# Patient Record
Sex: MALE | Race: WHITE | NOT HISPANIC OR LATINO | Employment: FULL TIME | URBAN - METROPOLITAN AREA
[De-identification: names, ages, dates, MRNs, and addresses within clinical notes are randomized per-mention and may not be internally consistent; named-entity substitution may affect disease eponyms.]

---

## 2017-01-03 ENCOUNTER — GENERIC CONVERSION - ENCOUNTER (OUTPATIENT)
Dept: OTHER | Facility: OTHER | Age: 72
End: 2017-01-03

## 2017-01-06 ENCOUNTER — ALLSCRIPTS OFFICE VISIT (OUTPATIENT)
Dept: OTHER | Facility: OTHER | Age: 72
End: 2017-01-06

## 2017-01-09 ENCOUNTER — GENERIC CONVERSION - ENCOUNTER (OUTPATIENT)
Dept: OTHER | Facility: OTHER | Age: 72
End: 2017-01-09

## 2017-02-07 ENCOUNTER — GENERIC CONVERSION - ENCOUNTER (OUTPATIENT)
Dept: OTHER | Facility: OTHER | Age: 72
End: 2017-02-07

## 2017-02-24 ENCOUNTER — ALLSCRIPTS OFFICE VISIT (OUTPATIENT)
Dept: OTHER | Facility: OTHER | Age: 72
End: 2017-02-24

## 2017-03-31 ENCOUNTER — ALLSCRIPTS OFFICE VISIT (OUTPATIENT)
Dept: OTHER | Facility: OTHER | Age: 72
End: 2017-03-31

## 2017-04-06 ENCOUNTER — ALLSCRIPTS OFFICE VISIT (OUTPATIENT)
Dept: OTHER | Facility: OTHER | Age: 72
End: 2017-04-06

## 2017-04-14 ENCOUNTER — ALLSCRIPTS OFFICE VISIT (OUTPATIENT)
Dept: OTHER | Facility: OTHER | Age: 72
End: 2017-04-14

## 2017-06-02 ENCOUNTER — ALLSCRIPTS OFFICE VISIT (OUTPATIENT)
Dept: OTHER | Facility: OTHER | Age: 72
End: 2017-06-02

## 2017-06-19 ENCOUNTER — ALLSCRIPTS OFFICE VISIT (OUTPATIENT)
Dept: OTHER | Facility: OTHER | Age: 72
End: 2017-06-19

## 2017-06-23 ENCOUNTER — GENERIC CONVERSION - ENCOUNTER (OUTPATIENT)
Dept: OTHER | Facility: OTHER | Age: 72
End: 2017-06-23

## 2017-06-28 ENCOUNTER — GENERIC CONVERSION - ENCOUNTER (OUTPATIENT)
Dept: OTHER | Facility: OTHER | Age: 72
End: 2017-06-28

## 2017-06-30 ENCOUNTER — GENERIC CONVERSION - ENCOUNTER (OUTPATIENT)
Dept: OTHER | Facility: OTHER | Age: 72
End: 2017-06-30

## 2017-07-11 ENCOUNTER — GENERIC CONVERSION - ENCOUNTER (OUTPATIENT)
Dept: OTHER | Facility: OTHER | Age: 72
End: 2017-07-11

## 2017-07-17 ENCOUNTER — ALLSCRIPTS OFFICE VISIT (OUTPATIENT)
Dept: OTHER | Facility: OTHER | Age: 72
End: 2017-07-17

## 2017-08-14 ENCOUNTER — GENERIC CONVERSION - ENCOUNTER (OUTPATIENT)
Dept: OTHER | Facility: OTHER | Age: 72
End: 2017-08-14

## 2017-10-13 ENCOUNTER — GENERIC CONVERSION - ENCOUNTER (OUTPATIENT)
Dept: OTHER | Facility: OTHER | Age: 72
End: 2017-10-13

## 2017-10-14 LAB
GLUCOSE SERPL-MCNC: 204 MG/DL (ref 65–99)
HBA1C MFR BLD HPLC: 8.4 % (ref 4.8–5.6)

## 2017-11-03 ENCOUNTER — ALLSCRIPTS OFFICE VISIT (OUTPATIENT)
Dept: OTHER | Facility: OTHER | Age: 72
End: 2017-11-03

## 2017-11-04 NOTE — PROGRESS NOTES
Assessment  1  DM type 2 (diabetes mellitus, type 2) (250 00) (E11 9)   2  Hypertension (401 9) (I10)   3  Coronary disease (414 00) (I25 10)   4  Aortic stenosis (424 1) (I35 0)   5  Erectile dysfunction (607 84) (N52 9)   6  Flu vaccine need (V04 81) (Z23)    Plan  DM type 2 (diabetes mellitus, type 2)    · MetFORMIN HCl - 500 MG Oral Tablet; 1 tab PO q pm x 1 week then 1 tab PO bid   · (1) GLUCOSE,  FASTING; Status:Active; Requested for:77Jgs6732;    · (1) HEMOGLOBIN A1C; Status:Active; Requested for:30Var3903;    · *VB - Eye Exam; Status:Active; Requested WXU:25AGX6650;    · *VB - Foot Exam; Status:Complete;   Done: 42USF2636 08:20AM   · Begin or continue regular aerobic exercise  Gradually work up to at least 3 sessions of 30 minutes  of exercise a week ; Status:Complete;   Done: 95WSN0518 08:21AM   · Follow a diabetic diet with 1500 calories ; Status:Complete;   Done: 88CMJ0708 08:21AM   · Have your eyes examined by an eye doctor every year ; Status:Complete;   Done: 78CMA5512  08:21AM   · Inspect your feet daily ; Status:Complete;   Done: 90TDV0598 08:21AM   · It is important to take good care of your feet if you have diabetes ; Status:Complete;   Done:  76IBH2943 08:21AM   · We recommend that you bring your body mass index down to 26 ; Status:Complete;   Done:  21LNQ3439 08:21AM   · Seek Immediate Medical Attention if: There are signs that the blood sugar is too high  (hyperglycemia) ; Status:Complete;   Done: 11ZFI6927 08:21AM   · Seek Immediate Medical Attention if: There are signs that the blood sugar is too low (hypoglycemia) ;  Status:Complete;   Done: 59YWM5413 08:21AM  Erectile dysfunction    · Viagra 100 MG Oral Tablet; Take 1/2 to 1 tablet one hour pior to activity  Flu vaccine need    · Fluzone High-Dose 0 5 ML Intramuscular Suspension Prefilled Syringe; Inject 0 5 ml; To  Be Done: 53XFG8241  Hypertension    · Begin or continue regular aerobic exercise   Gradually work up to at least 3 sessions of 30 minutes  of exercise a week ; Status:Complete;   Done: 46VVN3435 08:21AM   · Continue with our present treatment plan ; Status:Complete;   Done: 53JXQ4741 08:21AM   · Restrict the salt in your diet by avoiding highly salted foods ; Status:Complete;   Done: 09KVU2864  08:21AM   · We recommend that you bring your body mass index down to 26 ; Status:Complete;   Done:  48IFY1441 08:21AM   · Call [phone] if: You become dizzy or lightheaded, especially when you stand up after sitting  for a while ; Status:Complete;   Done: 07ZDU7102 08:21AM   · Call [phone] if: You develop double vision (see two of everything) ; Status:Complete;   Done:  15SJH3758 08:21AM   · Call 911 if: You experience a new kind of chest pain (angina) or pressure ; Status:Complete;   Done:  44RXI0683 08:21AM   · Call 911 if: You have any symptoms of a stroke ; Status:Complete;   Done: 25HYI5088 08:21AM   · Seek Immediate Medical Attention if: You have a severe headache that will not go away ;  Status:Complete;   Done: 03AWS9659 08:21AM    Discussion/Summary  Discussion Summary:   DM type 2 w/o complications - uncontrolled with A1C 8 4 - urged to get back on track with exercise and con't low sugar/carb diet, will start Metformin 500 mg bid and recheck BW in 3 mo - order given, foot exam done today and has eye exam scheduled for Nov, he is on ASA/statin/ARB  - BP better by end of appt, con't current regimen - med list up dated  and AS with h/o AVR - recent stress and Echo wnl, no current symptoms, con't meds and f/u as per Cardio  - trial of Viagra - SE d/w pt and urged not to take if uses a nitro at all - has not had to use at all in past few mos  vaccine given, is UTD on all other immunizations  on [place]     Counseling Documentation With Imm: The patient was counseled regarding diagnostic results,-- instructions for management,-- risk factor reductions,-- prognosis,-- patient and family education,-- impressions,-- risks and benefits of treatment options,-- importance of compliance with treatment  Medication SE Review and Pt Understands Tx: Possible side effects of new medications were reviewed with the patient/guardian today  The treatment plan was reviewed with the patient/guardian  The patient/guardian understands and agrees with the treatment plan   Self Referrals:   Self Referrals: No      Chief Complaint  Chief Complaint Chronic Condition [facility]: Patient is here today for follow up of chronic conditions described in HPI  History of Present Illness  HPI: Pt here for routine follow up appt and BW results  results were d/w pt in detail: FBS/A1C were up at 204/8  4  Def of controlled vs uncontrolled DM was reviewed  Diet was reviewed - wgt up 5 lbs from last year  Pt is currently diet controlled and not on any DM meds  He is on ASA/statin/ARB  He is due for foot exam  He notes no foot pain/numbness/tingling  He is not aware of any sores on his feet  His eye exam is due in Nov and he has appt scheduled  up a bit today  Meds were reviewed and changes have occurred  BP was up over the summer when he saw Cardio and they increased his Irbesartan and then also added Spironolactone  Pt notes no SE with the meds and denies missing doses of meds  He notes no frequent HA's/dizziness/double vision/CP  He has been checking BP at home and he states at home they are 130/70's  He has repeat BW to be done for Cardio and has appt early Dec    had episode of CP and SOB over the summer  He went to ER and had neg w/u  He saw Cardio for w/u and was noted to have elevated BP as noted above  He did have stress test and Echo and no acute ischemic changes were noted and no systolic dysfunction was present - diastolic was still present  His prosthetic aortic valve was functioning normally  He notes since BP has come down he feels much better  He has had no recent CP/palp/SOB/edema  has noted some issues with ED - obtaining and keeping an erection   He has never tried ED meds  He denies any use of nitrites recently  He notes no urinary symptoms/hesitancy/nocturia  is agreeable to flu vaccine      Review of Systems  Complete-Male:   Constitutional: recent 5 lb weight gain, but-- no fever-- and-- no chills  Eyes: no eyesight problems  ENT: no sore throat-- and-- no nasal discharge  Cardiovascular: no chest pain,-- no palpitations-- and-- no extremity edema  Respiratory: no shortness of breath,-- no cough-- and-- no wheezing  Gastrointestinal: no abdominal pain,-- no constipation-- and-- no diarrhea  Genitourinary: no dysuria,-- no urinary hesitancy-- and-- no nocturia  Musculoskeletal: no arthralgias-- and-- no myalgias  Integumentary: no rashes  Neurological: no headache-- and-- no dizziness  Psychiatric: no anxiety-- and-- no depression  Endocrine: erectile dysfunction, but-- no muscle weakness  Hematologic/Lymphatic: no tendency for easy bleeding-- and-- no tendency for easy bruising  Active Problems  1  Aortic stenosis (424 1) (I35 0)   2  History of CABG   3  Colonoscopy (Fiberoptic)   4  Colonoscopy (Fiberoptic)   5  Colonoscopy (Fiberoptic) Screening   6  Coronary disease (414 00) (I25 10)   7  DM type 2 (diabetes mellitus, type 2) (250 00) (E11 9)   8  Dyslipidemia (272 4) (E78 5)   9  Elevation of level of transaminase or lactic acid dehydrogenase (LDH) (790 4) (R74 0)   10  Esophageal reflux (530 81) (K21 9)   11  Fatty liver (571 8) (K76 0)   12  Flu vaccine need (V04 81) (Z23)   13  Hypertension (401 9) (I10)   14  Knee pain (719 46) (M25 569)   15  Malignant lymphoma (202 80) (C85 90)   16  Malignant tumor of cecum (153 4) (C18 0)   17  Motion sickness (994 6) (T75 3XXA)   18  Need for revaccination (V05 9) (Z23)   19  Primary osteoarthritis of both knees (715 16) (M17 0)   20  Screening for prostate cancer (V76 44) (Z12 5)    Past Medical History  1  History of Acute upper respiratory infection (465 9) (J06 9)   2   History of Atrial fibrillation (427 31) (I48 91)   3  History of CABG   4  History of Colonoscopy (Fiberoptic) Screening   5  History of Community acquired pneumonia (5) (J18 9)   6  History of Cough with hemoptysis (786 39) (R04 2)   7  History of acute bronchitis (V12 69) (Z87 09)   8  History of acute sinusitis (V12 69) (Z87 09)   9  History of chest pain (V13 89) (Z87 898)   10  History of diarrhea (V12 79) (Z87 898)   11  History of fever (V13 89) (Z87 898)   12  History of nicotine dependence (V15 82) (Z87 891)   13  History of Palpitations (785 1) (R00 2)   14  History of Paroxysmal atrial fibrillation (427 31) (I48 0)   15  History of Skin rash (782 1) (R21)   16  History of Streptococcus pneumoniae vaccination indicated (V49 89) (Z91 89)  Active Problems And Past Medical History Reviewed: The active problems and past medical history were reviewed and updated today  Surgical History  1  History of Aortic Valve Replacement   2  History of Complete Colonoscopy   3  History of Complete Colonoscopy   4  History of Complete Colonoscopy   5  History of Complete Colonoscopy   6  History of Dental Surgery   7  History of Knee Surgery   8  History of Lymphadenectomy   9  History of Maze Procedure   10  History of Partial Colectomy  Surgical History Reviewed: The surgical history was reviewed and updated today  Family History  Mother    1  Denied: Family history of Alcohol abuse   2  Denied: Family history of depression   3  Denied: Family history of substance abuse   4  Denied: Family history of Premature Cardiovascular Disease  Father    5  Denied: Family history of Alcohol abuse   6  Family history of cardiac disorder (V17 49) (Z82 49)   7  Denied: Family history of depression   8  Denied: Family history of substance abuse   9  Denied: Family history of Premature Cardiovascular Disease  Family History Reviewed: The family history was reviewed and updated today         Social History   · Alcohol Use (History)   · Former smoker (V37 56) (O66 248)   · Marital History - Currently    · Working Full Time  Social History Reviewed: The social history was reviewed and updated today  Current Meds   1  HM Aspirin 325 MG Oral Tablet; TAKE 1 TABLET DAILY; Therapy: 34BEI1497 to (Evaluate:05Apr2015) Recorded   2  ICaps Oral Capsule; TAKE AS DIRECTED; Therapy: 67NYC6002 to Recorded   3  Irbesartan 300 MG Oral Tablet; TAKE 1 TABLET DAILY AS DIRECTED; Therapy: 35LLV3978 to (Evaluate:01Jun2018) Recorded   4  Metoprolol Succinate ER 50 MG Oral Tablet Extended Release 24 Hour; TAKE 1 TABLET DAILY; Therapy: 15PGT0550 to ([facility]) Recorded   5  OrthoVisc 30 MG/2ML Intra-articular Solution Prefilled Syringe; INJECT 2  ML Intra-articular; To Be   Done: 80DBW4883; Status: HOLD FOR - Administration Ordered   6  Pravastatin Sodium 40 MG Oral Tablet; Take 1 tablet by mouth at bedtime; Therapy: 81GOQ7298 to (Evaluate:12Jan2018)  Requested for: 82ORY9909; Last Rx:16Jun2017   Ordered   7  PriLOSEC 20 MG CPDR; TAKE 1 CAPSULE DAILY EVERY MORNING BEFORE BREAKFAST; Therapy: 19FES8925 to Recorded   8  Spironolactone 25 MG Oral Tablet; take 1 tablet by mouth once daily; Therapy: 83XIE8732 to (Evaluate:01Jun2018) Recorded   9  Transderm-Scop (1 5 MG) 1 MG/3DAYS Transdermal Patch 72 Hour; APPLY 1 PATCH BEHIND THE   EAR EVERY 3 DAYS  APPLY AT LEAST 4 HOURS BEFORE TRIP; Therapy: 83GHQ4533 to (Last Rx:02Jun2017)  Requested for: 02Jun2017 Ordered  Medication List Reviewed: The medication list was reviewed and updated today  Allergies  1   No Known Drug Allergies    Vitals  Vital Signs    Recorded: 27BRR8381 08:13AM Recorded: 94MWA5448 07:50AM   Temperature  96 2 F   Heart Rate  70   Systolic 336 188   Diastolic 76 78   Height  5 ft 11 in   Weight  197 lb    BMI Calculated  27 48   BSA Calculated  2 1     Physical Exam    Constitutional   General appearance: No acute distress, well appearing and well nourished  Pulmonary   Respiratory effort: No increased work of breathing or signs of respiratory distress  Auscultation of lungs: Clear to auscultation, equal breath sounds bilaterally, no wheezes, no rales, no rhonci  Cardiovascular   Auscultation of heart: Normal rate and rhythm, normal S1 and S2, without murmurs  Examination of extremities for edema and/or varicosities: Normal     Abdomen   Abdomen: Non-tender, no masses  Musculoskeletal   Gait and station: Normal     Psychiatric   Mood and affect: Normal     Diabetic Foot Screen: Normal       Socks and shoes removed, Right Foot Findings: normal foot, no swelling, no erythema  The right toes were normal    The sensory exam showed temp sensation intact, but-- normal vibratory sensation at the level of the toes on the right-- and-- normal vibratory sensation at the level of the toes  Normal tactile sensation with monofilament testing throughout the right foot  Socks and shoes removed, Left Foot Findings: normal foot, no swelling, no erythema  The left toes were normal    The sensory exam showed temp sensation intact, but-- normal vibratory sensation at the level of the toes on the left-- and-- normal vibratory sensation at the level of the toes  Normal tactile sensation with monofilament testing throughout the left foot  Pulses:   2+ in the dorsalis pedis on the right  Pulses:   2+ in the dorsalis pedis on the left  Assign Risk Category: 0: No loss of protective sensation, no deformity  No present risk  Health Management  Colonoscopy (Fiberoptic) Screening   COLONOSCOPY; every 1 year; Last 43RUG5241; Next Due: 20Mar2016;  Overdue    Signatures   Electronically signed by : Brenda Chicas DO; Nov  3 2017  7:55AM EST                       (Author)    Electronically signed by : Brenda Chicas DO; Nov  3 2017  8:08AM EST                       (Author)    Electronically signed by : Brenda Chicas DO; Nov  3 2017  8:14AM EST (Author)    Electronically signed by : Uriel Lyon DO; Nov  3 2017  8:24AM EST                       (Author)

## 2017-12-08 ENCOUNTER — GENERIC CONVERSION - ENCOUNTER (OUTPATIENT)
Dept: OTHER | Facility: OTHER | Age: 72
End: 2017-12-08

## 2017-12-15 ENCOUNTER — GENERIC CONVERSION - ENCOUNTER (OUTPATIENT)
Dept: OTHER | Facility: OTHER | Age: 72
End: 2017-12-15

## 2017-12-22 ENCOUNTER — GENERIC CONVERSION - ENCOUNTER (OUTPATIENT)
Dept: OTHER | Facility: OTHER | Age: 72
End: 2017-12-22

## 2017-12-27 ENCOUNTER — GENERIC CONVERSION - ENCOUNTER (OUTPATIENT)
Dept: OTHER | Facility: OTHER | Age: 72
End: 2017-12-27

## 2018-01-08 ENCOUNTER — GENERIC CONVERSION - ENCOUNTER (OUTPATIENT)
Dept: FAMILY MEDICINE CLINIC | Facility: HOSPITAL | Age: 73
End: 2018-01-08

## 2018-01-09 NOTE — MISCELLANEOUS
Message  Message Free Text Note Form: Pt called 1/1/17 w/complaint of 2-3 days of cough/congestion and not feeling well  Has been using OTC cold meds  Pt was advised to con't OTC cold meds/rest/fluids/lozenges/hot teas/gargles and to go to Urgent [facility] or ER or call Tuesday for appt  Wife very worried about pneumonia - advised he needed to be evaluated in person  She did not think he would be agreeable to go to ER or Urgent Care - advised to call early Tuesday am for appt to be seen   Go to ER if symptoms worsen      Signatures   Electronically signed by : Betty Barillas DO; Pérez  3 2017  7:40AM EST                       (Author)

## 2018-01-11 NOTE — RESULT NOTES
Verified Results  * XR CHEST PA & LATERAL 16JAI5081 07:31AM Addie Going Order Number: [id number]     Test Name Result Flag Reference   XR CHEST PA & LATERAL (Report)     CHEST      INDICATION: Follow-up pneumonia  Persistent cough  COMPARISON: February 5, 2016     VIEWS: Frontal and lateral projections; 3 images     FINDINGS:        The heart is enlarged  Atherosclerotic changes in the aorta  Previous median sternotomy  Aortic valve repair  Epicardial pacing leads  Lungs well aerated  Near complete resolution of right lower lobe pneumonia  Right internal jugular Harwoq-i-Pzgr with tip at cavoatrial junction  Visualized osseous structures appear within normal limits for the patient's age  IMPRESSION:   Near complete resolution of right lower lobe pneumonia  Workstation performed: [id number]     Signed by:   Omid Berg DO   3/11/16       Discussion/Summary    please notify pt that his CXR has improved but the pneumonia has not completely resolved radiographically - no further tx needed if he has no F/C/SOB or worsening cough - will just need to repeat CXR in 4 wks to ensure complete resolution - order put in allscripts to mail to pt      Wife is aware  1       1 Amended By: Domenico Lorenzo; Mar 11 2016 11:52 AM EST    Signatures   Electronically signed by :  Domenico Lorenzo, ; Mar 11 2016 11:52AM EST                       (Author)

## 2018-01-12 VITALS
HEART RATE: 76 BPM | DIASTOLIC BLOOD PRESSURE: 75 MMHG | WEIGHT: 198 LBS | HEIGHT: 71 IN | SYSTOLIC BLOOD PRESSURE: 155 MMHG | BODY MASS INDEX: 27.72 KG/M2

## 2018-01-12 NOTE — RESULT NOTES
Verified Results  * XR CHEST PA & LATERAL 08Apr2016 10:54AM [employer] Order Number: [id number]     Test Name Result Flag Reference   XR CHEST PA & LATERAL (Report)     CHEST      INDICATION: Follow-up pneumonia  COMPARISON: March 11, 2016     VIEWS: Frontal and lateral projections; 3 images     FINDINGS:        The heart is top normal in size  Prior median sternotomy with valve replacement  Epicardial pacing leads  Right internal jugular Hpkprg-a-Gpxx with tip at cavoatrial junction  The lungs are clear  Resolution of right lower lobe pneumonia  No pneumothorax or pleural effusion  Visualized osseous structures appear within normal limits for the patient's age  Degenerative changes thoracic spine  IMPRESSION:     No active pulmonary disease  Workstation performed: [id number]     Signed by:   Trixie Ny DO   4/8/16     * XR CHEST PA & LATERAL [id number] 07:31AM [employer] Order Number: [id number]     Test Name Result Flag Reference   XR CHEST PA & LATERAL (Report)     CHEST      INDICATION: Follow-up pneumonia  Persistent cough  COMPARISON: February 5, 2016     VIEWS: Frontal and lateral projections; 3 images     FINDINGS:        The heart is enlarged  Atherosclerotic changes in the aorta  Previous median sternotomy  Aortic valve repair  Epicardial pacing leads  Lungs well aerated  Near complete resolution of right lower lobe pneumonia  Right internal jugular Axcfch-b-Xqji with tip at cavoatrial junction  Visualized osseous structures appear within normal limits for the patient's age  IMPRESSION:   Near complete resolution of right lower lobe pneumonia         Workstation performed: [id number]     Signed by:   Trixie Ny DO   3/11/16       Plan  PMH: Community acquired pneumonia    · * XR CHEST PA & LATERAL; Status:Complete;   Done: 62VOE8128 10:54AM    Discussion/Summary    please notify pt that his f/u CXR showed no further pneumonia - no further imaging needed as test was nml        Pt is aware1       1 Amended By: Cammy Vargas; Apr 08 2016 2:57 PM EST    Signatures   Electronically signed by :  Cammy Vargas, ; Apr 8 2016  2:58PM EST                       (Author)

## 2018-01-13 VITALS
WEIGHT: 194 LBS | BODY MASS INDEX: 27.16 KG/M2 | TEMPERATURE: 98.7 F | HEART RATE: 78 BPM | DIASTOLIC BLOOD PRESSURE: 62 MMHG | SYSTOLIC BLOOD PRESSURE: 132 MMHG | HEIGHT: 71 IN

## 2018-01-13 VITALS
BODY MASS INDEX: 26.95 KG/M2 | HEIGHT: 71 IN | WEIGHT: 192.5 LBS | DIASTOLIC BLOOD PRESSURE: 71 MMHG | HEART RATE: 71 BPM | SYSTOLIC BLOOD PRESSURE: 128 MMHG

## 2018-01-13 VITALS
WEIGHT: 194 LBS | DIASTOLIC BLOOD PRESSURE: 79 MMHG | SYSTOLIC BLOOD PRESSURE: 146 MMHG | BODY MASS INDEX: 27.16 KG/M2 | HEIGHT: 71 IN | HEART RATE: 65 BPM

## 2018-01-14 VITALS
DIASTOLIC BLOOD PRESSURE: 76 MMHG | TEMPERATURE: 96.2 F | BODY MASS INDEX: 27.58 KG/M2 | WEIGHT: 197 LBS | HEART RATE: 70 BPM | SYSTOLIC BLOOD PRESSURE: 134 MMHG | HEIGHT: 71 IN

## 2018-01-14 VITALS
HEIGHT: 71 IN | SYSTOLIC BLOOD PRESSURE: 133 MMHG | WEIGHT: 95 LBS | HEART RATE: 68 BPM | BODY MASS INDEX: 13.3 KG/M2 | DIASTOLIC BLOOD PRESSURE: 72 MMHG

## 2018-01-14 VITALS
SYSTOLIC BLOOD PRESSURE: 117 MMHG | DIASTOLIC BLOOD PRESSURE: 68 MMHG | HEART RATE: 83 BPM | WEIGHT: 194 LBS | BODY MASS INDEX: 27.06 KG/M2

## 2018-01-14 VITALS
HEIGHT: 71 IN | DIASTOLIC BLOOD PRESSURE: 82 MMHG | HEART RATE: 72 BPM | WEIGHT: 193 LBS | TEMPERATURE: 97.4 F | SYSTOLIC BLOOD PRESSURE: 138 MMHG | BODY MASS INDEX: 27.02 KG/M2

## 2018-01-14 VITALS
TEMPERATURE: 98.6 F | OXYGEN SATURATION: 98 % | WEIGHT: 193 LBS | DIASTOLIC BLOOD PRESSURE: 62 MMHG | BODY MASS INDEX: 27.02 KG/M2 | HEIGHT: 71 IN | SYSTOLIC BLOOD PRESSURE: 122 MMHG | HEART RATE: 70 BPM

## 2018-01-24 VITALS
HEART RATE: 72 BPM | BODY MASS INDEX: 27.58 KG/M2 | DIASTOLIC BLOOD PRESSURE: 68 MMHG | SYSTOLIC BLOOD PRESSURE: 116 MMHG | WEIGHT: 197 LBS | HEIGHT: 71 IN

## 2018-01-24 VITALS
HEIGHT: 71 IN | WEIGHT: 197 LBS | SYSTOLIC BLOOD PRESSURE: 122 MMHG | BODY MASS INDEX: 27.58 KG/M2 | HEART RATE: 78 BPM | DIASTOLIC BLOOD PRESSURE: 78 MMHG

## 2018-01-24 VITALS
BODY MASS INDEX: 27.58 KG/M2 | SYSTOLIC BLOOD PRESSURE: 120 MMHG | HEART RATE: 72 BPM | DIASTOLIC BLOOD PRESSURE: 78 MMHG | HEIGHT: 71 IN | WEIGHT: 197 LBS

## 2018-02-08 PROBLEM — N52.9 ERECTILE DYSFUNCTION: Status: ACTIVE | Noted: 2017-11-03

## 2018-02-08 PROBLEM — C44.91 BASAL CELL CARCINOMA OF SKIN: Status: ACTIVE | Noted: 2018-01-23

## 2018-02-08 RX ORDER — SPIRONOLACTONE AND HYDROCHLOROTHIAZIDE 25; 25 MG/1; MG/1
1 TABLET ORAL DAILY
COMMUNITY
Start: 2018-02-03 | End: 2018-02-08 | Stop reason: DRUGHIGH

## 2018-02-08 RX ORDER — ASPIRIN 325 MG
1 TABLET ORAL DAILY
Status: ON HOLD | COMMUNITY
Start: 2012-09-14 | End: 2019-01-30

## 2018-02-08 RX ORDER — SPIRONOLACTONE 25 MG/1
1 TABLET ORAL DAILY
COMMUNITY
Start: 2017-11-03 | End: 2019-01-31 | Stop reason: HOSPADM

## 2018-02-08 RX ORDER — OMEPRAZOLE 20 MG/1
1 CAPSULE, DELAYED RELEASE ORAL DAILY
COMMUNITY
Start: 2012-09-14 | End: 2019-12-20 | Stop reason: DRUGHIGH

## 2018-02-08 RX ORDER — IRBESARTAN 300 MG/1
1 TABLET ORAL DAILY
COMMUNITY
Start: 2014-10-07 | End: 2019-02-04 | Stop reason: SDUPTHER

## 2018-02-08 RX ORDER — PRAVASTATIN SODIUM 40 MG
1 TABLET ORAL
COMMUNITY
Start: 2012-05-17 | End: 2018-03-11 | Stop reason: SDUPTHER

## 2018-02-08 RX ORDER — SILDENAFIL 100 MG/1
.5-1 TABLET, FILM COATED ORAL
COMMUNITY
Start: 2017-11-03 | End: 2018-02-16 | Stop reason: ALTCHOICE

## 2018-02-08 RX ORDER — METOPROLOL SUCCINATE 50 MG/1
1 TABLET, EXTENDED RELEASE ORAL DAILY
COMMUNITY
Start: 2014-03-26 | End: 2018-05-25

## 2018-02-08 RX ORDER — METOPROLOL SUCCINATE 100 MG/1
1 TABLET, EXTENDED RELEASE ORAL DAILY
COMMUNITY
Start: 2018-01-25 | End: 2018-02-08 | Stop reason: DRUGHIGH

## 2018-02-10 LAB
GLUCOSE SERPL-MCNC: 198 MG/DL (ref 65–99)
HBA1C MFR BLD: 7.9 % (ref 4.8–5.6)

## 2018-02-16 ENCOUNTER — OFFICE VISIT (OUTPATIENT)
Dept: FAMILY MEDICINE CLINIC | Facility: HOSPITAL | Age: 73
End: 2018-02-16
Payer: COMMERCIAL

## 2018-02-16 VITALS
TEMPERATURE: 97.2 F | DIASTOLIC BLOOD PRESSURE: 68 MMHG | WEIGHT: 197 LBS | SYSTOLIC BLOOD PRESSURE: 122 MMHG | HEART RATE: 72 BPM | BODY MASS INDEX: 27.58 KG/M2 | HEIGHT: 71 IN

## 2018-02-16 DIAGNOSIS — Z00.00 MEDICARE ANNUAL WELLNESS VISIT, INITIAL: ICD-10-CM

## 2018-02-16 DIAGNOSIS — M17.0 PRIMARY OSTEOARTHRITIS OF BOTH KNEES: ICD-10-CM

## 2018-02-16 DIAGNOSIS — E11.9 TYPE 2 DIABETES MELLITUS WITHOUT COMPLICATION, WITHOUT LONG-TERM CURRENT USE OF INSULIN (HCC): Primary | ICD-10-CM

## 2018-02-16 DIAGNOSIS — Z12.11 SCREENING FOR COLON CANCER: ICD-10-CM

## 2018-02-16 DIAGNOSIS — C18.0 MALIGNANT TUMOR OF CECUM (HCC): ICD-10-CM

## 2018-02-16 DIAGNOSIS — Z12.5 ENCOUNTER FOR PROSTATE CANCER SCREENING: ICD-10-CM

## 2018-02-16 DIAGNOSIS — N52.9 ERECTILE DYSFUNCTION, UNSPECIFIED ERECTILE DYSFUNCTION TYPE: Primary | ICD-10-CM

## 2018-02-16 DIAGNOSIS — I10 HYPERTENSION, UNSPECIFIED TYPE: ICD-10-CM

## 2018-02-16 DIAGNOSIS — Z13.6 SCREENING FOR ABDOMINAL AORTIC ANEURYSM: ICD-10-CM

## 2018-02-16 PROCEDURE — G0438 PPPS, INITIAL VISIT: HCPCS | Performed by: INTERNAL MEDICINE

## 2018-02-16 PROCEDURE — 3725F SCREEN DEPRESSION PERFORMED: CPT | Performed by: INTERNAL MEDICINE

## 2018-02-16 PROCEDURE — 99214 OFFICE O/P EST MOD 30 MIN: CPT | Performed by: INTERNAL MEDICINE

## 2018-02-16 RX ORDER — TADALAFIL 20 MG/1
20 TABLET ORAL DAILY PRN
Qty: 10 TABLET | Refills: 0 | Status: SHIPPED | OUTPATIENT
Start: 2018-02-16 | End: 2018-05-25

## 2018-02-16 RX ORDER — METFORMIN HYDROCHLORIDE 500 MG/1
500 TABLET, EXTENDED RELEASE ORAL
Qty: 30 TABLET | Refills: 3 | Status: SHIPPED | OUTPATIENT
Start: 2018-02-16 | End: 2018-05-25 | Stop reason: SDUPTHER

## 2018-02-16 NOTE — PATIENT INSTRUCTIONS
Obesity   AMBULATORY CARE:   Obesity  is when your body mass index (BMI) is greater than 30  Your healthcare provider will use your height and weight to measure your BMI  The risks of obesity include  many health problems, such as injuries or physical disability  You may need tests to check for the following:  · Diabetes     · High blood pressure or high cholesterol     · Heart disease     · Gallbladder or liver disease     · Cancer of the colon, breast, prostate, liver, or kidney     · Sleep apnea     · Arthritis or gout  Seek care immediately if:   · You have a severe headache, confusion, or difficulty speaking  · You have weakness on one side of your body  · You have chest pain, sweating, or shortness of breath  Contact your healthcare provider if:   · You have symptoms of gallbladder or liver disease, such as pain in your upper abdomen  · You have knee or hip pain and discomfort while walking  · You have symptoms of diabetes, such as intense hunger and thirst, and frequent urination  · You have symptoms of sleep apnea, such as snoring or daytime sleepiness  · You have questions or concerns about your condition or care  Treatment for obesity  focuses on helping you lose weight to improve your health  Even a small decrease in BMI can reduce the risk for many health problems  Your healthcare provider will help you set a weight-loss goal   · Lifestyle changes  are the first step in treating obesity  These include making healthy food choices and getting regular physical activity  Your healthcare provider may suggest a weight-loss program that involves coaching, education, and therapy  · Medicine  may help you lose weight when it is used with a healthy diet and physical activity  · Surgery  can help you lose weight if you are very obese and have other health problems  There are several types of weight-loss surgery  Ask your healthcare provider for more information    Be successful losing weight:   · Set small, realistic goals  An example of a small goal is to walk for 20 minutes 5 days a week  Anther goal is to lose 5% of your body weight  · Tell friends, family members, and coworkers about your goals  and ask for their support  Ask a friend to lose weight with you, or join a weight-loss support group  · Identify foods or triggers that may cause you to overeat , and find ways to avoid them  Remove tempting high-calorie foods from your home and workplace  Place a bowl of fresh fruit on your kitchen counter  If stress causes you to eat, then find other ways to cope with stress  · Keep a diary to track what you eat and drink  Also write down how many minutes of physical activity you do each day  Weigh yourself once a week and record it in your diary  Eating changes: You will need to eat 500 to 1,000 fewer calories each day than you currently eat to lose 1 to 2 pounds a week  The following changes will help you cut calories:  · Eat smaller portions  Use small plates, no larger than 9 inches in diameter  Fill your plate half full of fruits and vegetables  Measure your food using measuring cups until you know what a serving size looks like  · Eat 3 meals and 1 or 2 snacks each day  Plan your meals in advance  Tammy Dry and eat at home most of the time  Eat slowly  · Eat fruits and vegetables at every meal   They are low in calories and high in fiber, which makes you feel full  Do not add butter, margarine, or cream sauce to vegetables  Use herbs to season steamed vegetables  · Eat less fat and fewer fried foods  Eat more baked or grilled chicken and fish  These protein sources are lower in calories and fat than red meat  Limit fast food  Dress your salads with olive oil and vinegar instead of bottled dressing  · Limit the amount of sugar you eat  Do not drink sugary beverages  Limit alcohol  Activity changes:  Physical activity is good for your body in many ways   It helps you burn calories and build strong muscles  It decreases stress and depression, and improves your mood  It can also help you sleep better  Talk to your healthcare provider before you begin an exercise program   · Exercise for at least 30 minutes 5 days a week  Start slowly  Set aside time each day for physical activity that you enjoy and that is convenient for you  It is best to do both weight training and an activity that increases your heart rate, such as walking, bicycling, or swimming  · Find ways to be more active  Do yard work and housecleaning  Walk up the stairs instead of using elevators  Spend your leisure time going to events that require walking, such as outdoor festivals or fairs  This extra physical activity can help you lose weight and keep it off  Follow up with your healthcare provider as directed: You may need to meet with a dietitian  Write down your questions so you remember to ask them during your visits  © 2017 [address] [employer] Information is for End User's use only and may not be sold, redistributed or otherwise used for commercial purposes  All illustrations and images included in CareNotes® are the copyrighted property of [email or web address]  or Drew Melendez  The above information is an  only  It is not intended as medical advice for individual conditions or treatments  Talk to your doctor, nurse or pharmacist before following any medical regimen to see if it is safe and effective for you  Urinary Incontinence   WHAT YOU NEED TO KNOW:   What is urinary incontinence? Urinary incontinence (UI) is when you lose control of your bladder  What causes UI? UI occurs because your bladder cannot store or empty urine properly  The following are the most common types of UI:  · Stress incontinence  is when you leak urine due to increased bladder pressure  This may happen when you cough, sneeze, or exercise       · Urge incontinence  is when you feel the need to urinate right away and leak urine accidentally  · Mixed incontinence  is when you have both stress and urge UI  What are the signs and symptoms of UI?   · You feel like your bladder does not empty completely when you urinate  · You urinate often and need to urinate immediately  · You leak urine when you sleep, or you wake up with the urge to urinate  · You leak urine when you cough, sneeze, exercise, or laugh  How is UI diagnosed? Your healthcare provider will ask how often you leak urine and whether you have stress or urge symptoms  Tell him which medicines you take, how often you urinate, and how much liquid you drink each day  You may need any of the following tests:  · Urine tests  may show infection or kidney function  · A pelvic exam  may be done to check for blockages  A pelvic exam will also show if your bladder, uterus, or other organs have moved out of place  · An x-ray, ultrasound, or CT  may show problems with parts of your urinary system  You may be given contrast liquid to help your organs show up better in the pictures  Tell the healthcare provider if you have ever had an allergic reaction to contrast liquid  Do not enter the MRI room with anything metal  Metal can cause serious injury  Tell the healthcare provider if you have any metal in or on your body  · A bladder scan  will show how much urine is left in your bladder after you urinate  You will be asked to urinate and then healthcare providers will use a small ultrasound machine to check the urine left in your bladder  · Cystometry  is used to check the function of your urinary system  Your healthcare provider checks the pressure in your bladder while filling it with fluid  Your bladder pressure may also be tested when your bladder is full and while you urinate  How is UI treated? · Medicines  can help strengthen your bladder control      · Electrical stimulation  is used to send a small amount of electrical energy to your pelvic floor muscles  This helps control your bladder function  Electrodes may be placed outside your body or in your rectum  For women, the electrodes may be placed in the vagina  · A bulking agent  may be injected into the wall of your urethra to make it thicker  This helps keep your urethra closed and decreases urine leakage  · Devices  such as a clamp, pessary, or tampon may help stop urine leaks  Ask your healthcare provider for more information about these and other devices  · Surgery  may be needed if other treatments do not work  Several types of surgery can help improve your bladder control  Ask your healthcare provider for more information about the surgery you may need  How can I manage my symptoms? · Do pelvic muscle exercises often  Your pelvic muscles help you stop urinating  Squeeze these muscles tight for 5 seconds, then relax for 5 seconds  Gradually work up to squeezing for 10 seconds  Do 3 sets of 15 repetitions a day, or as directed  This will help strengthen your pelvic muscles and improve bladder control  · A catheter  may be used to help empty your bladder  A catheter is a tiny, plastic tube that is put into your bladder to drain your urine  Your healthcare provider may tell you to use a catheter to prevent your bladder from getting too full and leaking urine  · Keep a UI record  Write down how often you leak urine and how much you leak  Make a note of what you were doing when you leaked urine  · Train your bladder  Go to the bathroom at set times, such as every 2 hours, even if you do not feel the urge to go  You can also try to hold your urine when you feel the urge to go  For example, hold your urine for 5 minutes when you feel the urge to go  As that becomes easier, hold your urine for 10 minutes  · Drink liquids as directed  Ask your healthcare provider how much liquid to drink each day and which liquids are best for you   You may need to limit the amount of liquid you drink to help control your urine leakage  Limit or do not have drinks that contain caffeine or alcohol  Do not drink any liquid right before you go to bed  · Prevent constipation  Eat a variety of high-fiber foods  Good examples are high-fiber cereals, beans, vegetables, and whole-grain breads  Prune juice may help make your bowel movement softer  Walking is the best way to trigger your intestines to have a bowel movement  · Exercise regularly and maintain a healthy weight  Ask your healthcare provider how much you should weigh and about the best exercise plan for you  Weight loss and exercise will decrease pressure on your bladder and help you control your leakage  Ask him to help you create a weight loss plan if you are overweight  When should I seek immediate care? · You have severe pain  · You are confused or cannot think clearly  When should I contact my healthcare provider? · You have a fever  · You see blood in your urine  · You have pain when you urinate  · You have new or worse pain, even after treatment  · Your mouth feels dry or you have vision changes  · Your urine is cloudy or smells bad  · You have questions or concerns about your condition or care  CARE AGREEMENT:   You have the right to help plan your care  Learn about your health condition and how it may be treated  Discuss treatment options with your caregivers to decide what care you want to receive  You always have the right to refuse treatment  The above information is an  only  It is not intended as medical advice for individual conditions or treatments  Talk to your doctor, nurse or pharmacist before following any medical regimen to see if it is safe and effective for you  © 2017 [address] Information is for End User's use only and may not be sold, redistributed or otherwise used for commercial purposes   All illustrations and images included in CareNotes® are the copyrighted property of A [employer] A M , Inc  or Drew Melendez  Cigarette Smoking and Your Health   AMBULATORY CARE:   Risks to your health if you smoke:  Nicotine and other chemicals found in tobacco damage every cell in your body  Even if you are a light smoker, you have an increased risk for cancer, heart disease, and lung disease  If you are pregnant or have diabetes, smoking increases your risk for complications  Benefits to your health if you stop smoking:   · You decrease respiratory symptoms such as coughing, wheezing, and shortness of breath  · You reduce your risk for cancers of the lung, mouth, throat, kidney, bladder, pancreas, stomach, and cervix  If you already have cancer, you increase the benefits of chemotherapy  You also reduce your risk for cancer returning or a second cancer from developing  · You reduce your risk for heart disease, blood clots, heart attack, and stroke  · You reduce your risk for lung infections, and diseases such as pneumonia, asthma, chronic bronchitis, and emphysema  · Your circulation improves  More oxygen can be delivered to your body  If you have diabetes, you lower your risk for complications, such as kidney, artery, and eye diseases  You also lower your risk for nerve damage  Nerve damage can lead to amputations, poor vision, and blindness  · You improve your body's ability to heal and to fight infections  Benefits to the health of others if you stop smoking:  Tobacco is harmful to nonsmokers who breathe in your secondhand smoke  The following are ways the health of others around you may improve when you stop smoking:  · You lower the risks for lung cancer and heart disease in nonsmoking adults  · If you are pregnant, you lower the risk for miscarriage, early delivery, low birth weight, and stillbirth  You also lower your baby's risk for SIDS, obesity, developmental delay, and neurobehavioral problems, such as ADHD  · If you have children, you lower their risk for ear infections, colds, pneumonia, bronchitis, and asthma  For more information and support to stop smoking:   · Smokefree  gov  Phone: 1- 400 - 747-0956  Web Address: www smokefrKopo Kopo  Follow up with your healthcare provider as directed:  Write down your questions so you remember to ask them during your visits  © 2017 [address] Information is for End User's use only and may not be sold, redistributed or otherwise used for commercial purposes  All illustrations and images included in CareNotes® are the copyrighted property of [employer] M [employer]  or Drew Melendez  The above information is an  only  It is not intended as medical advice for individual conditions or treatments  Talk to your doctor, nurse or pharmacist before following any medical regimen to see if it is safe and effective for you  Fall Prevention   WHAT YOU NEED TO KNOW:   What is fall prevention? Fall prevention includes ways to make your home and other areas safer  It also includes ways you can move more carefully to prevent a fall  What increases my risk for falls? · Lack of vitamin D    · Not getting enough sleep each night    · Trouble walking or keeping your balance, or foot problems    · Health conditions that cause changes in your blood pressure, vision, or muscle strength and coordination    · Medicines that make you dizzy, weak, or sleepy    · Problems seeing clearly    · Shoes that have high heels or are not supportive    · Tripping hazards, such as items left on the floor, no handrails on the stairs, or broken steps  How can I help protect myself from falls? · Stand or sit up slowly  This may help you keep your balance and prevent falls  If you need to get up during the night, sit up first  Be sure you are fully awake before you stand  Turn on the light before you start walking  Go slowly in case you are still sleepy   Make sure you will not trip over any pets sleeping in the bedroom  · Use assistive devices as directed  Your healthcare provider may suggest that you use a cane or walker to help you keep your balance  You may need to have grab bars put in your bathroom near the toilet or in the shower  · Wear shoes that fit well and have soles that   Wear shoes both inside and outside  Use slippers with good   Do not wear shoes with high heels  · Wear a personal alarm  This is a device that allows you to call 911 if you fall and need help  Ask your healthcare provider for more information  · Stay active  Exercise can help strengthen your muscles and improve your balance  Your healthcare provider may recommend water aerobics or walking  He or she may also recommend physical therapy to improve your coordination  Never start an exercise program without talking to your healthcare provider first      · Manage medical conditions  Keep all appointments with your healthcare providers  Visit your eye doctor as directed  How can I make my home safer? · Add items to prevent falls in the bathroom  Put nonslip strips on your bath or shower floor to prevent you from slipping  Use a bath mat if you do not have carpet in the bathroom  This will prevent you from falling when you step out of the bath or shower  Use a shower seat so you do not need to stand while you shower  Sit on the toilet or a chair in your bathroom to dry yourself and put on clothing  This will prevent you from losing your balance from drying or dressing yourself while you are standing  · Keep paths clear  Remove books, shoes, and other objects from walkways and stairs  Place cords for telephones and lamps out of the way so that you do not need to walk over them  Tape them down if you cannot move them  Remove small rugs  If you cannot remove a rug, secure it with double-sided tape  This will prevent you from tripping  · Install bright lights in your home  Use night lights to help light paths to the bathroom or kitchen  Always turn on the light before you start walking  · Keep items you use often on shelves within reach  Do not use a step stool to help you reach an item  · Paint or place reflective tape on the edges of your stairs  This will help you see the stairs better  Call 911 or have someone else call if:   · You have fallen and are unconscious  · You have fallen and cannot move part of your body  Contact your healthcare provider if:   · You have fallen and have pain or a headache  · You have questions or concerns about your condition or care  CARE AGREEMENT:   You have the right to help plan your care  Learn about your health condition and how it may be treated  Discuss treatment options with your caregivers to decide what care you want to receive  You always have the right to refuse treatment  The above information is an  only  It is not intended as medical advice for individual conditions or treatments  Talk to your doctor, nurse or pharmacist before following any medical regimen to see if it is safe and effective for you  © 2017 [address] [facility] Information is for End User's use only and may not be sold, redistributed or otherwise used for commercial purposes  All illustrations and images included in CareNotes® are the copyrighted property of [employer]  or Drew Melendez  Advance Directives   WHAT YOU NEED TO KNOW:   What are advance directives? Advance directives are legal documents that state your wishes and plans for medical care  These plans are made ahead of time in case you lose your ability to make decisions for yourself  Advance directives can apply to any medical decision, such as the treatments you want, and if you want to donate organs  What are the types of advance directives? There are many types of advance directives, and each state has rules about how to use them   You may choose a combination of any of the following:  · Living will: This is a written record of the treatment you want  You can also choose which treatments you do not want, which to limit, and which to stop at a certain time  This includes surgery, medicine, IV fluid, and tube feedings  · Durable power of  for healthcare [facility]): This is a written record that states who you want to make healthcare choices for you when you are unable to make them for yourself  This person, called a proxy, is usually a family member or a friend  You may choose more than 1 proxy  · Do not resuscitate (DNR) order:  A DNR order is used in case your heart stops beating or you stop breathing  It is a request not to have certain forms of treatment, such as CPR  A DNR order may be included in other types of advance directives  · Medical directive: This covers the care that you want if you are in a coma, near death, or unable to make decisions for yourself  You can list the treatments you want for each condition  Treatment may include pain medicine, surgery, blood transfusions, dialysis, IV or tube feedings, and a ventilator (breathing machine)  · Values history: This document has questions about your views, beliefs, and how you feel and think about life  This information can help others choose the care that you would choose  Why are advance directives important? An advance directive helps you control your care  Although spoken wishes may be used, it is better to have your wishes written down  Spoken wishes can be misunderstood, or not followed  Treatments may be given even if you do not want them  An advance directive may make it easier for your family to make difficult choices about your care  How do I decide what to put in my advance directives? · Make informed decisions:  Make sure you fully understand treatments or care you may receive   Think about the benefits and problems your decisions could cause for you or your family  Talk to healthcare providers if you have concerns or questions before you write down your wishes  You may also want to talk with your Buddhism or , or a   Check your state laws to make sure that what you put in your advance directive is legal      · Sign all forms:  Sign and date your advance directive when you have finished  You may also need 2 witnesses to sign the forms  Witnesses cannot be your doctor or his staff, your spouse, heirs or beneficiaries, people you owe money to, or your chosen proxy  Talk to your family, proxy, and healthcare providers about your advance directive  Give each person a copy, and keep one for yourself in a place you can get to easily  Do not keep it hidden or locked away  · Review and revise your plans: You can revise your advance directive at any time, as long as you are able to make decisions  Review your plan every year, and when there are changes in your life, or your health  When you make changes, let your family, proxy, and healthcare providers know  Give each a new copy  Where can I find more information? · [employer]  [address] [place] , [address]  Phone: 3- 034 - [phone]  Phone: 5- 072 - [phone]  Web Address: http://www  aafp org  · [address] [facility])  [address] [facility] [place] [address] [place] [address]  Phone: 7- [phone]  Phone: [phone]  Web Address: Shu rudolph  CARE AGREEMENT:   You have the right to help plan your care  To help with this plan, you must learn about your health condition and treatment options  You must also learn about advance directives and how they are used  Work with your healthcare providers to decide what care will be used to treat you  You always have the right to refuse treatment  The above information is an  only   It is not intended as medical advice for individual conditions or treatments  Talk to your doctor, nurse or pharmacist before following any medical regimen to see if it is safe and effective for you  © 2017 [address] [employer] Information is for End User's use only and may not be sold, redistributed or otherwise used for commercial purposes  All illustrations and images included in CareNotes® are the copyrighted property of A D A M , [employer]  or Drew Melendez

## 2018-02-16 NOTE — ASSESSMENT & PLAN NOTE
DM type 2 w/o complications - uncontrolled with A1C improved but still high at 7 9 - is forgetting second dose of Metformin- switch to Metformin ER and recheck A1C in 3mos, UTD on foot exam, had eye exam in Nov - will get copy of report, on ASA/statin/ARB

## 2018-02-16 NOTE — PROGRESS NOTES
Assessment/Plan:    Hypertension  BP at goal - con't current regimen    DM type 2 (diabetes mellitus, type 2) ([facility] )  DM type 2 w/o complications - uncontrolled with A1C improved but still high at 7 9 - is forgetting second dose of Metformin- switch to Metformin ER and recheck A1C in 3mos, UTD on foot exam, had eye exam in Nov - will get copy of report, on ASA/statin/ARB    Malignant tumor of cecum ([facility] )  No current s/sx of reoccurrence, due for colonoscopy in March 2018 - wishes to establish with local GI - number for [facility] given today, urged to ensure he does [place] next month    Primary osteoarthritis of both knees  Pt with great benefit with first 2 orthovisc injections, feels much better, cont' f/u with Ortho prn       Diagnoses and all orders for this visit:    Type 2 diabetes mellitus without complication, without long-term current use of insulin (HCC)  -     metFORMIN (GLUCOPHAGE-XR) 500 mg 24 hr tablet; Take 1 tablet (500 mg total) by mouth daily with dinner for 30 days  -     CBC and differential; Future  -     Comprehensive metabolic panel; Future  -     Hemoglobin A1c; Future  -     Lipid panel; Future  -     Microalbumin / creatinine urine ratio; Future  -     TSH, 3rd generation with T4 reflex; Future  -     CBC and differential  -     Comprehensive metabolic panel  -     Hemoglobin A1c  -     Lipid panel  -     TSH, 3rd generation with T4 reflex    Hypertension, unspecified type  -     CBC and differential; Future  -     Comprehensive metabolic panel; Future  -     Hemoglobin A1c; Future  -     Lipid panel; Future  -     Microalbumin / creatinine urine ratio; Future  -     TSH, 3rd generation with T4 reflex; Future  -     CBC and differential  -     Comprehensive metabolic panel  -     Hemoglobin A1c  -     Lipid panel  -     TSH, 3rd generation with T4 reflex    Primary osteoarthritis of both knees  -     CBC and differential; Future  -     Comprehensive metabolic panel;  Future  -     Hemoglobin A1c; Future  -     Lipid panel; Future  -     Microalbumin / creatinine urine ratio; Future  -     TSH, 3rd generation with T4 reflex; Future  -     CBC and differential  -     Comprehensive metabolic panel  -     Hemoglobin A1c  -     Lipid panel  -     TSH, 3rd generation with T4 reflex    Malignant tumor of cecum (HCC)  -     CBC and differential; Future  -     Comprehensive metabolic panel; Future  -     Hemoglobin A1c; Future  -     Lipid panel; Future  -     Microalbumin / creatinine urine ratio; Future  -     TSH, 3rd generation with T4 reflex; Future  -     CBC and differential  -     Comprehensive metabolic panel  -     Hemoglobin A1c  -     Lipid panel  -     TSH, 3rd generation with T4 reflex    Encounter for prostate cancer screening  -     PSA; Future    Screening for colon cancer  -     Cancel: Ambulatory referral to Gastroenterology; Future  -     Ambulatory referral to Gastroenterology; Future    Screening for abdominal aortic aneurysm  -     US abdominal aorta screening aaa    Medicare annual wellness visit, initial          Subjective:      Patient ID: Katina Cuellar is a 71 y o  male  HPI Pt here for DM follow up and AWV    BW was reviewed with pt in detail: FBS was up at 198 but A1C was better at 7 9  Def of controlled vs uncontrolled DM was reviewed with pt in detail  Diet was reviewed - wgt unchanged from last appt  He is taking his Metformin daily as directed but is frequently forgetting the evening dose  He is on ASA/ARB/statin  He is UTD on foot exam      Bp at goal today and meds were reviewed and no changes have occurred  He notes no Se with the meds and denies missing doses of meds  He notes no frequent Ha/dizziness/double vision/CP  Pt  Has been following with Ortho since our last appt  He has received 3 OrthoVisc injections for his OA of B/L knees  He con't to have issues with Ed    He tried the Viagra and tolerated it well but had no benefit with the medication - even on a full tab  He has never tried Levitra or Cialis  Pt is due for his [place] in March for f/u for his colon Ca  He has not received notice about the procedure yet  He notes no C/D/blood in stool/abd pain  He is asking for number for GI doc locally  Review of Systems   Constitutional: Negative for chills, fatigue and fever  HENT: Negative for congestion and sinus pain  Eyes: Negative for pain and redness  Respiratory: Negative for cough, chest tightness and shortness of breath  Cardiovascular: Negative for chest pain, palpitations and leg swelling  Gastrointestinal: Negative for abdominal pain, blood in stool, constipation, diarrhea, nausea and vomiting  Endocrine: Negative for polydipsia and polyuria  Genitourinary: Negative for difficulty urinating and dysuria  Musculoskeletal: Negative for arthralgias and myalgias  Skin: Negative for rash and wound  Neurological: Negative for dizziness and headaches  Hematological: Does not bruise/bleed easily  Psychiatric/Behavioral: Negative for behavioral problems and confusion  Objective:    Vitals:    02/16/18 0752   BP: 122/68   Pulse: 72   Temp: (!) 97 2 °F (36 2 °C)        Physical Exam   Constitutional: He appears well-developed and well-nourished  No distress  HENT:   Head: Normocephalic and atraumatic  Right Ear: External ear normal    Left Ear: External ear normal    Mouth/Throat: No oropharyngeal exudate  Dentures   Eyes: EOM are normal  Pupils are equal, round, and reactive to light  Right eye exhibits no discharge  Left eye exhibits no discharge  Neck: Neck supple  No tracheal deviation present  Cardiovascular: Normal rate and regular rhythm  No murmur heard  No carotid bruits B/L   Pulmonary/Chest: Effort normal and breath sounds normal  No respiratory distress  He has no wheezes  He has no rales  Abdominal: Soft  He exhibits no distension  There is no tenderness  Musculoskeletal: He exhibits no edema  Lymphadenopathy:     He has no cervical adenopathy  Neurological: He is alert  No cranial nerve deficit  Skin: Skin is warm and dry  No rash noted  Psychiatric: He has a normal mood and affect  His behavior is normal    Nursing note and vitals reviewed

## 2018-02-16 NOTE — PROGRESS NOTES
HPI:  Vishnu Hawkins is a 71 y o  male here for his Initial Wellness Visit      Patient Active Problem List   Diagnosis    Aortic stenosis    Basal cell carcinoma of skin    Coronary disease    DM type 2 (diabetes mellitus, type 2) (HCC)    Dyslipidemia    Elevation of level of transaminase or lactic acid dehydrogenase (LDH)    Erectile dysfunction    Esophageal reflux    Fatty liver    Hypertension    Mal[facility] )    [facility]    Primary osteoarthritis of both knees     Past Medical History:   Diagnosis Date    Atrial fibrillation ([facility] )     Depression     Diarrhea     Hx of CABG     Nicotine dependence     Pneumonia     Substance abuse      Past Surgical History:   Procedure Laterality Date    AORTIC VALVE REPLACEMENT      COLECTOMY      COLONOSCOPY      DENTAL SURGERY      KNEE SURGERY      LYMPHADENECTOMY      OTHER SURGICAL HISTORY      MAZE PROCEDURE     Family History   Problem Relation Age of Onset    Heart block Family      History   Smoking Status    Former Smoker   Smokeless Tobacco    Never Used     History   Alcohol Use    Yes      History   Drug use: Unknown     /68   Pulse 72   Temp (!) 97 2 °F (36 2 °C)   Ht 5' 11" (1 803 m)   Wt 89 4 kg (197 lb)   BMI 27 48 kg/m²       Current Outpatient Prescriptions   Medication Sig Dispense Refill    aspirin (HM ASPIRIN) 325 mg tablet Take 1 tablet by mouth daily      irbesartan (AVAPRO) 300 mg tablet Take 1 tablet by mouth daily      metFORMIN (GLUCOPHAGE-XR) 500 mg 24 hr tablet Take 1 tablet (500 mg total) by mouth daily with dinner for 30 days 30 tablet 3    metoprolol succinate (TOPROL-XL) 50 mg 24 hr tablet Take 1 tablet by mouth daily      Multiple Vitamins-Minerals (ICAPS PO) Take 1 capsule by mouth daily      omeprazole (PRILOSEC) 20 mg delayed release capsule Take 1 capsule by mouth Daily      pravastatin (PRAVACHOL) 40 mg tablet Take 1 tablet by mouth      spironolactone (ALDACTONE) 25 mg tablet Take 1 tablet by mouth daily       No current facility-administered medications for this visit  Allergies not on file  Immunization History   Administered Date(s) Administered    Influenza Split High Dose Preservative Free IM 09/18/2012, 10/07/2014, 09/30/2015, 11/28/2016, 11/03/2017    Pneumococcal Conjugate 13-Valent 02/15/2016, 02/10/2017    Pneumococcal Polysaccharide PPV23 10/01/2003, 09/18/2012    Tdap 06/10/2014    Zoster 07/27/2014       Patient Care Team:  Yudi Hernanedz DO as PCP - General (Internal Medicine)  Yudi Hernandez DO as PCP - General  Shama Villalba MD    Medicare Screening Tests and Risk Assessments:  Review of Systems   Constitutional: Negative for chills, fatigue and fever  HENT: Negative for congestion and sinus pain  Eyes: Negative for pain and redness  Respiratory: Negative for cough, chest tightness and shortness of breath  Cardiovascular: Negative for chest pain, palpitations and leg swelling  Gastrointestinal: Negative for abdominal pain, blood in stool, constipation, diarrhea, nausea and vomiting  Endocrine: Negative for polydipsia and polyuria  Genitourinary: Negative for difficulty urinating and dysuria  Musculoskeletal: Positive for arthralgias  Negative for myalgias  Skin: Negative for rash and wound  Neurological: Negative for dizziness and headaches  Hematological: Does not bruise/bleed easily  Psychiatric/Behavioral: Negative for behavioral problems and confusion  AWV Clinical     ISAR:   Previous hospitalizations?:  Yes   Additional Comments:  no hospitalizations in the past year       Once in a Lifetime Medicare Screening:   EKG performed?:  Yes Results:  1/8/18 at cardio office   AAA screening performed? (if performed, please add date to Health Maintenance):  No       Medicare Screening Tests and Risk Assessment:   AAA Risk Assessment     Tobacco use (males only):   Yes   Age over 72 (males only):   Yes Family history of AAA:  No   Osteoporosis Risk Assessment    :  Yes    Age over 48:  Yes    Tobacco use:  No     PMHX of fractures:  No   HIV Risk Assessment    None indicated:  Yes        Drug and Alcohol Use:   Tobacco use    Cigarettes:  former smoker    Quit date:  1/1/10   Smokeless:  never used smokeless tobacco    Tobacco use duration    Tobacco Cessation Readiness    Alcohol use    Alcohol use:  occasional use    Amount of alcohol consumed:  4 beers a week   Concern about alcohol use:  No    Alcohol Treatment Readiness   Illicit Drug Use    Drug use:  never        Diet & Exercise:   Diet   What is your diet?:  Regular   How many servings a day of the following:   Fruits and Vegetables:  1-2 Meat:  1-2   Whole Grains:  2 Simple Carbs:  1   Dairy:  2 Soda:  2   Coffee:  1 Tea:  0   Exercise    Do you currently exercise?:  yes    Minutes per day:  60   Times per week:  5     Type of exercise:  walking       Cognitive Impairment Screening:   Depression screening preformed:  Yes    Depression screening results:  mild to moderate symptoms   Cognitive Impairment Screening    Do you have difficulty learning or retaining new information?:  No    Do you have difficulty with reasoning?:  No    Do you have difficulty with language?:  No Do you have difficulty with behavior?:  No       Functional Ability/Level of Safety:   Hearing    Hearing difficulties:  No Bilateral:  normal   Left:  normal Right:  normal   Hearing aid:  No    Hearing Impairment Assessment    Current Activities    Status:  unlimited ADL's, unlimited driving, limited social activities, unlimited IADL's   Help needed with the folllowing:    Using the phone:  No Transportation:  No   Shopping:  No Preparing Meals:  No   Doing Housework:  No Doing Laundry:  No   Managing Medications:  No Managing Money:  No   ADL    Fall Risk   Have you fallen in the last 12 months?:  No    Injury History    Urinary Incontinence:  No       Home Safety:   Are there hazards in your environment?:  No   Home Safety Risk Factors   Unfamilar with surroundings:  No Uneven floors:  No   Stairs or handrail saftey risk:  No Loose rugs:  Yes   Household clutter:  No    No grab bars in bathroom:  No        Advanced Directives:   Advanced Directives    Living Will:  No Durable POA for healthcare:  No   Advanced directive:  No    Patient's End of Life Decisions    Reviewed with patient:  No        Urinary Incontinence:   Do you have urinary incontinence?:  No     Do you have dysuria (painful and/or difficult urination)?:  No       Glaucoma:            Provider Screening     Preventative Screening/Counseling:   Cardiovascular Screening/Counseling:   (Labs Q5 years, EKG optional one-time)   General:  Screening Current           Diabetes Screening/Counseling:   (2 tests/year if Pre-Diabetes or 1 test/year if no Diabetes)   General:  Screening Current           Colorectal Cancer Screening/Counseling:   (FOBT Q1 yr; Flex Sig Q4 yrs or Q10 yrs after Screening Colonoscopy; Screening Colonoscpy Q2 yrs High Risk or Q10 yrs Low Risk; Barium Enema Q2 yrs High Risk or Q4 yrs Low Risk)   General:  Screening Current           Prostate Cancer Screening/Counseling:   (Annual)    General:  Screening Current          Breast Cancer Screening/Counseling:   (Baseline Age 28 - 43; Annual Age 36+)         Cervical Cancer Screening/Counseling:   (Annual for High Risk or Childbearing Age with Abnormal Pap in Last 3 yrs;  Every 2 all others)         Osteoporosis Screening/Counseling:   (Every 2 Yrs if at risk or more if medically necessary)   General:  Screening Not Indicated           AAA Screening/Counseling:   (Once per Lifetime with risk factors)    Family History of AAA:  No Age over 72 (males only):  Yes Tobacco use (males only):  Yes   General:  Screening Not Indicated           Glaucoma Screening/Counseling:   (Annual)   General:  Screening Current          HIV Screening/Counseling: (Voluntary; Once annually for high risk OR 3 times for Pregnancy at diagnosis of IUP; 3rd trimester; and at Labor   General:  Screening Not Indicated           Hepatitis C Screening:   Hepatitis C Counseling Provided: Yes               Immunizations:   Influenza (annual): Influenza UTD This Year   Pneumococcal (Once in a Lifetime):  Lifetime Vaccine Completed   Hepatitis B Series (medium to high risk patients scheduled series):  Vaccine Status Unknown   Zostavax (Medicare D Coverage, Pt >64 yo):  Zostavax Vaccine UTD   Tdap (Non-Medicare Wellness Visit required): Tdap Vaccine UTD       Other Preventative Couseling (Non-Medicare Wellness Visit Required):   car/seat belt/driving safety reviewed, sunscreen education provided       Referrals (Non-Medicare Wellness Visit Required):       Medical Equipment/Suppliers:            Visual Acuity Screening    Right eye Left eye Both eyes   Without correction: 20/70 20/50 20/40   With correction:          Physical Exam :  Physical Exam   Constitutional: He is oriented to person, place, and time  He appears well-developed and well-nourished  No distress  HENT:   Head: Normocephalic  Left Ear: External ear normal    Dentures   Eyes: Conjunctivae are normal  Pupils are equal, round, and reactive to light  Neck: Neck supple  Cardiovascular: Normal rate and regular rhythm  No murmur heard  No carotid bruits on exam   Pulmonary/Chest: Effort normal and breath sounds normal  He has no wheezes  He has no rales  Abdominal: Soft  He exhibits no distension  There is no tenderness  Musculoskeletal: He exhibits no edema  Lymphadenopathy:     He has no cervical adenopathy  Neurological: He is alert and oriented to person, place, and time  No cranial nerve deficit  Skin: Skin is warm and dry  No rash noted  Psychiatric: He has a normal mood and affect  His behavior is normal  Judgment and thought content normal    Nursing note and vitals reviewed        Reviewed Updated  Lu's Prior Wellness Visits:   Last Medicare wellness visit information was reviewed, patient interviewed , no change since last AWVno  Last Medicare wellness visit information was reviewed, patient interviewed and updates made to the record today no    Assessment and Plan:  1  Type 2 diabetes mellitus without complication, without long-term current use of insulin (HCC)  metFORMIN (GLUCOPHAGE-XR) 500 mg 24 hr tablet    CBC and differential    Comprehensive metabolic panel    Hemoglobin A1c    Lipid panel    Microalbumin / creatinine urine ratio    TSH, 3rd generation with T4 reflex    CBC and differential    Comprehensive metabolic panel    Hemoglobin A1c    Lipid panel    TSH, 3rd generation with T4 reflex   2  Hypertension, unspecified type  CBC and differential    Comprehensive metabolic panel    Hemoglobin A1c    Lipid panel    Microalbumin / creatinine urine ratio    TSH, 3rd generation with T4 reflex    CBC and differential    Comprehensive metabolic panel    Hemoglobin A1c    Lipid panel    TSH, 3rd generation with T4 reflex   3  Primary osteoarthritis of both knees  CBC and differential    Comprehensive metabolic panel    Hemoglobin A1c    Lipid panel    Microalbumin / creatinine urine ratio    TSH, 3rd generation with T4 reflex    CBC and differential    Comprehensive metabolic panel    Hemoglobin A1c    Lipid panel    TSH, 3rd generation with T4 reflex   4  Malignant tumor of cecum (HCC)  CBC and differential    Comprehensive metabolic panel    Hemoglobin A1c    Lipid panel    Microalbumin / creatinine urine ratio    TSH, 3rd generation with T4 reflex    CBC and differential    Comprehensive metabolic panel    Hemoglobin A1c    Lipid panel    TSH, 3rd generation with T4 reflex   5  Encounter for prostate cancer screening  PSA   6  Screening for colon cancer  Ambulatory referral to Gastroenterology    Ambulatory referral to Gastroenterology   7   Screening for abdominal aortic aneurysm  US abdominal aorta screening aaa       Health Maintenance Due   Topic Date Due    Hepatitis C Screening  1945    COLONOSCOPY  1945    Depression Screening PHQ-9  12/16/1958    Fall Risk  12/16/2011    ABDOMINAL AORTIC ANEURYSM (AAA) SCREEN  12/16/2011    GLAUCOMA SCREENING 67+ YR  12/16/2013    DTaP,Tdap,and Td Vaccines (2 - Td) 07/08/2014    OPHTHALMOLOGY EXAM  11/08/2017

## 2018-02-16 NOTE — ASSESSMENT & PLAN NOTE
No current s/sx of reoccurrence, due for colonoscopy in March 2018 - wishes to establish with local GI - number for [facility] given today, urged to ensure he does [place] next month

## 2018-02-16 NOTE — ASSESSMENT & PLAN NOTE
Pt with great benefit with first 2 orthovisc injections, feels much better, cont' f/u with Ortho prn

## 2018-02-23 ENCOUNTER — HOSPITAL ENCOUNTER (OUTPATIENT)
Dept: ULTRASOUND IMAGING | Facility: HOSPITAL | Age: 73
Discharge: HOME | End: 2018-02-23
Payer: COMMERCIAL

## 2018-02-23 PROCEDURE — 76706 US ABDL AORTA SCREEN AAA: CPT

## 2018-03-07 NOTE — PROGRESS NOTES
History of Present Illness    Revaccination   Vaccine Information: Vaccine(s) Given (names): prevnar  Spoke with family regarding vaccine out of temperature range  Action(s): Pt will be revaccinated  Appointment scheduled: [id number]  Revaccination Completed: [id number]  Active Problems    1  Acute sinusitis (461 9) (J01 90)   2  Aortic stenosis (424 1) (I35 0)   3  History of CABG   4  Colonoscopy (Fiberoptic)   5  Colonoscopy (Fiberoptic)   6  Colonoscopy (Fiberoptic) Screening   7  Coronary disease (414 00) (I25 10)   8  DM type 2 (diabetes mellitus, type 2) (250 00) (E11 9)   9  Dyslipidemia (272 4) (E78 5)   10  Elevation of level of transaminase or lactic acid dehydrogenase (LDH) (790 4) (R74 0)   11  Esophageal reflux (530 81) (K21 9)   12  Fatty liver (571 8) (K76 0)   13  Flu vaccine need (V04 81) (Z23)   14  Hypertension (401 9) (I10)   15  Knee pain (719 46) (M25 569)   16  Malignant lymphoma (202 80) (C85 90)   17  Malignant tumor of cecum (153 4) (C18 0)   18  Motion sickness (994 6) (T75 3XXA)   19  Need for revaccination (V05 9) (Z23)   20  Primary osteoarthritis of both knees (715 16) (M17 0)   21  Screening for prostate cancer (V76 44) (Z12 5)    Immunizations  Influenza --- Rafael Stare: [id number] Mallet: [id number] Main: 30-Sep-2015; Series4:  28-Nov-2016   PCV --- Series1: 15-Feb-2016   PPSV --- Rafael Stare: Oct 2003; Series2: 18-Sep-2012   Tdap --- Rafael Stare: 10-Anthony-2014   Zoster --- Series1: 27-Jul-2014     Current Meds   1  Ferrous Sulfate 325 (65 Fe) MG Oral Tablet; 1 tab PO q day   2  FreeStyle Lancets Miscellaneous   3  FreeStyle Lite Test In Vitro Strip; TEST DAILY AS DIRECTED   4  Glucosamine Chondr 500 Complex Oral Capsule   5  HM Aspirin 325 MG Oral Tablet; TAKE 1 TABLET DAILY   6  ICaps Oral Capsule; TAKE AS DIRECTED   7  Irbesartan 150 MG Oral Tablet; Take 1 tablet daily   8   Metoprolol Succinate ER 50 MG Oral Tablet Extended Release 24 Hour; TAKE 1 TABLET   DAILY   9  Pravastatin Sodium 40 MG Oral Tablet; Take 1 tablet by mouth at bedtime   10  PriLOSEC 20 MG CPDR; TAKE 1 CAPSULE DAILY EVERY MORNING BEFORE    BREAKFAST   11  Transderm-Scop (1 5 MG) 1 MG/3DAYS Transdermal Patch 72 Hour; APPLY 1 PATCH    BEHIND THE EAR EVERY 3 DAYS  APPLY AT LEAST 4 HOURS BEFORE TRIP   12  Triamcinolone Acetonide 0 1 % External Cream; apply to affected area bid x 10 days then    stop  Do not use on face or genitals    Allergies    1  No Known Drug Allergies    Plan    1   RVAC-Prevnar 13 Intramuscular Suspension    Future Appointments    Date/Time Provider Specialty Site   06/02/2017 08:00 AM Judy Salmon DO Internal Medicine Kei Yang MD     Signatures   Electronically signed by : Elvira Stovall DO; Feb 10 2017  9:05AM EST                       (Author)

## 2018-03-11 DIAGNOSIS — E11.9 TYPE 2 DIABETES MELLITUS WITHOUT COMPLICATION, WITHOUT LONG-TERM CURRENT USE OF INSULIN (HCC): ICD-10-CM

## 2018-03-11 DIAGNOSIS — E78.5 DYSLIPIDEMIA: Primary | ICD-10-CM

## 2018-03-11 RX ORDER — PRAVASTATIN SODIUM 40 MG
TABLET ORAL
Qty: 30 TABLET | Refills: 6 | Status: SHIPPED | OUTPATIENT
Start: 2018-03-11 | End: 2018-11-16 | Stop reason: SDUPTHER

## 2018-03-27 ENCOUNTER — OFFICE VISIT (OUTPATIENT)
Dept: FAMILY MEDICINE CLINIC | Facility: HOSPITAL | Age: 73
End: 2018-03-27
Payer: COMMERCIAL

## 2018-03-27 VITALS
TEMPERATURE: 97.4 F | HEIGHT: 71 IN | HEART RATE: 78 BPM | SYSTOLIC BLOOD PRESSURE: 124 MMHG | WEIGHT: 195.4 LBS | DIASTOLIC BLOOD PRESSURE: 70 MMHG | BODY MASS INDEX: 27.35 KG/M2

## 2018-03-27 DIAGNOSIS — H00.011 HORDEOLUM EXTERNUM OF RIGHT UPPER EYELID: ICD-10-CM

## 2018-03-27 DIAGNOSIS — J32.9 SINUSITIS, UNSPECIFIED CHRONICITY, UNSPECIFIED LOCATION: ICD-10-CM

## 2018-03-27 DIAGNOSIS — B02.9 HERPES ZOSTER WITHOUT COMPLICATION: ICD-10-CM

## 2018-03-27 DIAGNOSIS — J20.9 ACUTE BRONCHITIS, UNSPECIFIED ORGANISM: Primary | ICD-10-CM

## 2018-03-27 PROCEDURE — 99214 OFFICE O/P EST MOD 30 MIN: CPT | Performed by: NURSE PRACTITIONER

## 2018-03-27 RX ORDER — METOPROLOL SUCCINATE 100 MG/1
TABLET, EXTENDED RELEASE ORAL
COMMUNITY
Start: 2018-02-25 | End: 2018-12-10

## 2018-03-27 RX ORDER — SPIRONOLACTONE AND HYDROCHLOROTHIAZIDE 25; 25 MG/1; MG/1
TABLET ORAL
Refills: 0 | COMMUNITY
Start: 2018-03-15 | End: 2018-05-25

## 2018-03-27 RX ORDER — AZITHROMYCIN 250 MG/1
TABLET, FILM COATED ORAL
Qty: 6 TABLET | Refills: 0 | Status: SHIPPED | OUTPATIENT
Start: 2018-03-27 | End: 2018-04-01

## 2018-03-27 RX ORDER — VALACYCLOVIR HYDROCHLORIDE 1 G/1
1000 TABLET, FILM COATED ORAL 2 TIMES DAILY
Qty: 14 TABLET | Refills: 0 | Status: SHIPPED | OUTPATIENT
Start: 2018-03-27 | End: 2018-05-25

## 2018-03-27 RX ORDER — ERYTHROMYCIN 5 MG/G
0.5 OINTMENT OPHTHALMIC
Qty: 3.5 G | Refills: 0 | Status: SHIPPED | OUTPATIENT
Start: 2018-03-27 | End: 2018-05-25

## 2018-03-29 ENCOUNTER — TELEPHONE (OUTPATIENT)
Dept: FAMILY MEDICINE CLINIC | Facility: HOSPITAL | Age: 73
End: 2018-03-29

## 2018-03-29 DIAGNOSIS — B02.9 HERPES ZOSTER WITHOUT COMPLICATION: Primary | ICD-10-CM

## 2018-03-29 RX ORDER — GABAPENTIN 300 MG/1
CAPSULE ORAL
Qty: 30 CAPSULE | Refills: 0 | Status: SHIPPED | OUTPATIENT
Start: 2018-03-29 | End: 2018-04-06 | Stop reason: SDUPTHER

## 2018-03-29 NOTE — TELEPHONE ENCOUNTER
Please call in prescription for tramadol  Also I sent script for gabapentin which is very helpful for nerve like pain

## 2018-03-29 NOTE — TELEPHONE ENCOUNTER
Patient was seen by you on Tuesday for Shingles, wife called stating he is in so much pain he can barely move and has never felt pain like this before  Rates pain 9/10-10/10  Has taken Advil but not helping  Wife wants to know if there is anything you can Rx for pain?  I advised if he was in that much pain to go to the ER but they wanted to see what you said first

## 2018-04-06 ENCOUNTER — TELEPHONE (OUTPATIENT)
Dept: FAMILY MEDICINE CLINIC | Facility: HOSPITAL | Age: 73
End: 2018-04-06

## 2018-04-06 DIAGNOSIS — B02.9 HERPES ZOSTER WITHOUT COMPLICATION: ICD-10-CM

## 2018-04-06 RX ORDER — GABAPENTIN 300 MG/1
CAPSULE ORAL
Qty: 90 CAPSULE | Refills: 1 | Status: SHIPPED | OUTPATIENT
Start: 2018-04-06 | End: 2018-05-25

## 2018-04-06 NOTE — TELEPHONE ENCOUNTER
Pt saw [facility] for shingles  hes out of the Gabapentin and the other pain med tabithol? He uses [facility] [address]   hes in a lot of pain, not sleeping from it, would like to know if we recommend anything else

## 2018-04-14 LAB
LEFT EYE DIABETIC RETINOPATHY: NORMAL
RIGHT EYE DIABETIC RETINOPATHY: NORMAL

## 2018-04-14 PROCEDURE — 3072F LOW RISK FOR RETINOPATHY: CPT | Performed by: INTERNAL MEDICINE

## 2018-05-09 ENCOUNTER — TRANSCRIBE ORDERS (OUTPATIENT)
Dept: ADMINISTRATIVE | Facility: HOSPITAL | Age: 73
End: 2018-05-09

## 2018-05-09 DIAGNOSIS — C90.20 EXTRAMEDULLARY PLASMACYTOMA NOT HAVING ACHIEVED REMISSION (HCC): Primary | ICD-10-CM

## 2018-05-14 ENCOUNTER — HOSPITAL ENCOUNTER (OUTPATIENT)
Dept: MRI IMAGING | Facility: HOSPITAL | Age: 73
Discharge: HOME | End: 2018-05-14
Payer: COMMERCIAL

## 2018-05-14 DIAGNOSIS — C90.20 EXTRAMEDULLARY PLASMACYTOMA NOT HAVING ACHIEVED REMISSION (HCC): ICD-10-CM

## 2018-05-14 PROCEDURE — 70543 MRI ORBT/FAC/NCK W/O &W/DYE: CPT

## 2018-05-14 PROCEDURE — A9585 GADOBUTROL INJECTION: HCPCS | Performed by: RADIOLOGY

## 2018-05-14 RX ADMIN — GADOBUTROL 8 ML: 604.72 INJECTION INTRAVENOUS at 10:08

## 2018-05-20 LAB
ALBUMIN SERPL-MCNC: 4.4 G/DL (ref 3.5–4.8)
ALBUMIN/GLOB SERPL: 2.2 {RATIO} (ref 1.2–2.2)
ALP SERPL-CCNC: 93 IU/L (ref 39–117)
ALT SERPL-CCNC: 23 IU/L (ref 0–44)
AST SERPL-CCNC: 21 IU/L (ref 0–40)
BASOPHILS # BLD AUTO: 0.1 X10E3/UL (ref 0–0.2)
BASOPHILS NFR BLD AUTO: 1 %
BILIRUB SERPL-MCNC: 0.5 MG/DL (ref 0–1.2)
BUN SERPL-MCNC: 20 MG/DL (ref 8–27)
BUN/CREAT SERPL: 16 (ref 10–24)
CALCIUM SERPL-MCNC: 9.4 MG/DL (ref 8.6–10.2)
CHLORIDE SERPL-SCNC: 98 MMOL/L (ref 96–106)
CHOLEST SERPL-MCNC: 123 MG/DL (ref 100–199)
CHOLEST/HDLC SERPL: 4 RATIO (ref 0–5)
CO2 SERPL-SCNC: 26 MMOL/L (ref 18–29)
CREAT SERPL-MCNC: 1.24 MG/DL (ref 0.76–1.27)
EOSINOPHIL # BLD AUTO: 0.2 X10E3/UL (ref 0–0.4)
EOSINOPHIL NFR BLD AUTO: 2 %
ERYTHROCYTE [DISTWIDTH] IN BLOOD BY AUTOMATED COUNT: 14.5 % (ref 12.3–15.4)
EST. AVERAGE GLUCOSE BLD GHB EST-MCNC: 177 MG/DL
GLOBULIN SER-MCNC: 2 G/DL (ref 1.5–4.5)
GLUCOSE SERPL-MCNC: 198 MG/DL (ref 65–99)
HBA1C MFR BLD: 7.8 % (ref 4.8–5.6)
HCT VFR BLD AUTO: 37 % (ref 37.5–51)
HDLC SERPL-MCNC: 31 MG/DL
HGB BLD-MCNC: 12.5 G/DL (ref 13–17.7)
IMM GRANULOCYTES # BLD: 0.1 X10E3/UL (ref 0–0.1)
IMM GRANULOCYTES NFR BLD: 1 %
LDLC SERPL CALC-MCNC: 58 MG/DL (ref 0–99)
LYMPHOCYTES # BLD AUTO: 1.6 X10E3/UL (ref 0.7–3.1)
LYMPHOCYTES NFR BLD AUTO: 19 %
MCH RBC QN AUTO: 30.3 PG (ref 26.6–33)
MCHC RBC AUTO-ENTMCNC: 33.8 G/DL (ref 31.5–35.7)
MCV RBC AUTO: 90 FL (ref 79–97)
MONOCYTES # BLD AUTO: 0.7 X10E3/UL (ref 0.1–0.9)
MONOCYTES NFR BLD AUTO: 8 %
NEUTROPHILS # BLD AUTO: 5.8 X10E3/UL (ref 1.4–7)
NEUTROPHILS NFR BLD AUTO: 69 %
PLATELET # BLD AUTO: 221 X10E3/UL (ref 150–379)
POTASSIUM SERPL-SCNC: 4.2 MMOL/L (ref 3.5–5.2)
PROT SERPL-MCNC: 6.4 G/DL (ref 6–8.5)
PSA SERPL-MCNC: 0.9 NG/ML (ref 0–4)
RBC # BLD AUTO: 4.13 X10E6/UL (ref 4.14–5.8)
SL AMB EGFR AFRICAN AMERICAN: 67 ML/MIN/1.73
SL AMB EGFR NON AFRICAN AMERICAN: 58 ML/MIN/1.73
SL AMB T4, FREE (DIRECT): 0.97 NG/DL (ref 0.82–1.77)
SL AMB VLDL CHOLESTEROL CALC: 34 MG/DL (ref 5–40)
SODIUM SERPL-SCNC: 141 MMOL/L (ref 134–144)
TRIGL SERPL-MCNC: 171 MG/DL (ref 0–149)
TSH SERPL DL<=0.005 MIU/L-ACNC: 5.15 UIU/ML (ref 0.45–4.5)
WBC # BLD AUTO: 8.4 X10E3/UL (ref 3.4–10.8)

## 2018-05-22 RX ORDER — PROPARACAINE HYDROCHLORIDE 5 MG/ML
SOLUTION/ DROPS OPHTHALMIC
Refills: 0 | COMMUNITY
Start: 2018-05-18 | End: 2018-05-25

## 2018-05-25 ENCOUNTER — OFFICE VISIT (OUTPATIENT)
Dept: FAMILY MEDICINE CLINIC | Facility: HOSPITAL | Age: 73
End: 2018-05-25
Payer: COMMERCIAL

## 2018-05-25 VITALS
SYSTOLIC BLOOD PRESSURE: 138 MMHG | TEMPERATURE: 97.8 F | WEIGHT: 194 LBS | HEIGHT: 71 IN | BODY MASS INDEX: 27.16 KG/M2 | HEART RATE: 66 BPM | DIASTOLIC BLOOD PRESSURE: 72 MMHG

## 2018-05-25 DIAGNOSIS — E78.5 DYSLIPIDEMIA: ICD-10-CM

## 2018-05-25 DIAGNOSIS — C90.00 MULTIPLE MYELOMA NOT HAVING ACHIEVED REMISSION (HCC): Primary | ICD-10-CM

## 2018-05-25 DIAGNOSIS — I10 ESSENTIAL HYPERTENSION: ICD-10-CM

## 2018-05-25 DIAGNOSIS — E11.9 TYPE 2 DIABETES MELLITUS WITHOUT COMPLICATION, WITHOUT LONG-TERM CURRENT USE OF INSULIN (HCC): ICD-10-CM

## 2018-05-25 DIAGNOSIS — R79.89 ELEVATED TSH: ICD-10-CM

## 2018-05-25 PROBLEM — J18.9 PNEUMONIA: Status: RESOLVED | Noted: 2018-05-25 | Resolved: 2018-05-25

## 2018-05-25 PROBLEM — F17.200 NICOTINE DEPENDENCE: Status: RESOLVED | Noted: 2018-05-25 | Resolved: 2018-05-25

## 2018-05-25 PROCEDURE — 99214 OFFICE O/P EST MOD 30 MIN: CPT | Performed by: INTERNAL MEDICINE

## 2018-05-25 RX ORDER — METFORMIN HYDROCHLORIDE 750 MG/1
750 TABLET, EXTENDED RELEASE ORAL
Qty: 30 TABLET | Refills: 6 | Status: SHIPPED | OUTPATIENT
Start: 2018-05-25 | End: 2019-01-09 | Stop reason: SDUPTHER

## 2018-05-25 NOTE — ASSESSMENT & PLAN NOTE
DM type 2 with hyperglycemia - uncontrolled with A1C 7 8 - going to be starting radiation soon and want sugars better controlled - urged to con't watching sugars/carbs and keep active and will increase Metformin ER to 750 mg 1 tab PO q day, will recheck BW in 3 mos - order given, UTD on foot and eye exam, on ARB and statin and ASA

## 2018-05-25 NOTE — ASSESSMENT & PLAN NOTE
BP upper end of nml for DM but acceptable, con't current regimen for now, urged to watch sodium in diet and keep active

## 2018-05-25 NOTE — ASSESSMENT & PLAN NOTE
Newly dx, as per pt he had PET and BM bx and no sign of mets noted, going to be starting radiation tx, call with any issues, con't f/u and tx as per Onc

## 2018-06-18 ENCOUNTER — TELEPHONE (OUTPATIENT)
Dept: OBGYN CLINIC | Facility: CLINIC | Age: 73
End: 2018-06-18

## 2018-06-18 DIAGNOSIS — M17.0 PRIMARY LOCALIZED OSTEOARTHRITIS OF KNEES, BILATERAL: Primary | ICD-10-CM

## 2018-06-18 NOTE — TELEPHONE ENCOUNTER
Dr Jadon Gonzalez is requesting another round of Orthovisc for his b/l knee pain  This last injection was 12/22/17  Best contact # [phone]    Thank you

## 2018-07-17 ENCOUNTER — TELEPHONE (OUTPATIENT)
Dept: OBGYN CLINIC | Facility: HOSPITAL | Age: 73
End: 2018-07-17

## 2018-07-17 NOTE — TELEPHONE ENCOUNTER
Caller- patients wife, Tiffany Alberto  - Bianka Best is requesting a status of the patients injections  She stated its been a month, per caller she will go somewhere else if she doesn't not receive a call by today

## 2018-07-17 NOTE — TELEPHONE ENCOUNTER
Spoke with wife to schedule  for 3 appointments with Dr Jamal Medeiros for bilateral knee injections (orthovisc) she stated they were on vacation and will call office back to schedule  Julieth ALEX#[phone] office may buy & bill

## 2018-08-17 ENCOUNTER — OFFICE VISIT (OUTPATIENT)
Dept: OBGYN CLINIC | Facility: CLINIC | Age: 73
End: 2018-08-17
Payer: COMMERCIAL

## 2018-08-17 VITALS
DIASTOLIC BLOOD PRESSURE: 74 MMHG | SYSTOLIC BLOOD PRESSURE: 120 MMHG | WEIGHT: 192 LBS | HEART RATE: 80 BPM | HEIGHT: 71 IN | BODY MASS INDEX: 26.88 KG/M2

## 2018-08-17 DIAGNOSIS — M17.11 PRIMARY LOCALIZED OSTEOARTHRITIS OF RIGHT KNEE: Primary | ICD-10-CM

## 2018-08-17 DIAGNOSIS — M17.12 PRIMARY LOCALIZED OSTEOARTHRITIS OF LEFT KNEE: ICD-10-CM

## 2018-08-17 PROCEDURE — 20610 DRAIN/INJ JOINT/BURSA W/O US: CPT | Performed by: PHYSICIAN ASSISTANT

## 2018-08-17 NOTE — PROGRESS NOTES
Assessment:     1  Primary localized osteoarthritis of right knee    2  Primary localized osteoarthritis of left knee        Plan:     Problem List Items Addressed This Visit        Musculoskeletal and Integument    Primary localized osteoarthritis of right knee - Primary    Relevant Medications    sodium hyaluronate (ORTHOVISC) injection SOSY 30 mg (Completed)    Other Relevant Orders    Large joint arthrocentesis (Completed)    Primary localized osteoarthritis of left knee    Relevant Medications    sodium hyaluronate (ORTHOVISC) injection SOSY 30 mg (Completed)    Other Relevant Orders    Large joint arthrocentesis (Completed)          Findings consistent with bilateral knee osteoarthritis  The 1st of 3 bilateral knee Orthovisc injections were given today  Patient tolerated the procedure well  Advised to apply cold compress today  Follow-up in 1 week for the 2nd injections  All questions were answered to patient's satisfaction  Subjective:     Patient ID: Sudhakar Benjamin is a 71 y o  male  Chief Complaint: This is a 71-year-old white male following up for bilateral knee osteoarthritis  He is here to be another series of Orthovisc injections  The last series was in December 2017  He states he got about 6 months of relief  The aching pain in his knees return  He states he is considering having the total knee replacements in January 2018        Allergy:  No Known Allergies  Medications:  all current active meds have been reviewed  Past Medical History:  Past Medical History:   Diagnosis Date    Atrial fibrillation ([address] )     Hx of CABG     Nicotine dependence     Pneumonia      Past Surgical History:  Past Surgical History:   Procedure Laterality Date    AORTIC VALVE REPLACEMENT      COLECTOMY      COLONOSCOPY      DENTAL SURGERY      KNEE SURGERY      LYMPHADENECTOMY      OTHER SURGICAL HISTORY      MAZE PROCEDURE     Family History:  Family History   Problem Relation Age of Onset    Heart block Family      Social History:  History   Alcohol Use    Yes     History   Drug use: Unknown     History   Smoking Status    Former Smoker   Smokeless Tobacco    Never Used     Review of Systems   Constitutional: Negative  HENT: Negative  Eyes: Negative  Respiratory: Negative  Cardiovascular: Negative  Gastrointestinal: Negative  Endocrine: Negative  Genitourinary: Negative  Musculoskeletal: Positive for arthralgias  Skin: Negative  Allergic/Immunologic: Negative  Neurological: Negative  Hematological: Negative  Psychiatric/Behavioral: Negative  Objective:  BP Readings from Last 1 Encounters:   08/17/18 120/74      Wt Readings from Last 1 Encounters:   08/17/18 87 1 kg (192 lb)      BMI:   Estimated body mass index is 26 78 kg/m² as calculated from the following:    Height as of this encounter: 5' 11" (1 803 m)  Weight as of this encounter: 87 1 kg (192 lb)  BSA:   Estimated body surface area is 2 07 meters squared as calculated from the following:    Height as of this encounter: 5' 11" (1 803 m)  Weight as of this encounter: 87 1 kg (192 lb)  Physical Exam   Constitutional: He is oriented to person, place, and time  He appears well-developed  HENT:   Head: Normocephalic and atraumatic  Eyes: EOM are normal  Pupils are equal, round, and reactive to light  Neck: Neck supple  Musculoskeletal:        Right knee: He exhibits no effusion  Left knee: He exhibits no effusion  Neurological: He is alert and oriented to person, place, and time  Skin: Skin is warm  Psychiatric: He has a normal mood and affect  Nursing note and vitals reviewed  Right Knee Exam     Tenderness   The patient is experiencing no tenderness  Range of Motion   The patient has normal right knee ROM  Muscle Strength     The patient has normal right knee strength      Tests   Varus: negative  Valgus: negative    Other   Erythema: absent  Sensation: normal  Pulse: present  Swelling: none  Other tests: no effusion present    Comments:  Genu varum  Joint hypertrophy  Patellofemoral crepitation      Left Knee Exam     Tenderness   The patient is experiencing no tenderness  Range of Motion   The patient has normal left knee ROM  Muscle Strength     The patient has normal left knee strength      Tests   Varus: negative  Valgus: negative    Other   Erythema: absent  Sensation: normal  Pulse: present  Swelling: none  Effusion: no effusion present    Comments:  Genu varum  Joint hypertrophy  Patellofemoral crepitation              Large joint arthrocentesis  Date/Time: 8/17/2018 2:51 PM  Consent given by: patient  Supporting Documentation  Indications: pain   Procedure Details  Location: knee - R knee  Preparation: Patient was prepped and draped in the usual sterile fashion  Needle size: 22 G  Approach: anterolateral  Medications administered: 30 mg sodium hyaluronate 30 mg/2 mL    Patient tolerance: patient tolerated the procedure well with no immediate complications  Dressing:  Sterile dressing applied  Large joint arthrocentesis  Date/Time: 8/17/2018 2:52 PM  Consent given by: patient  Supporting Documentation  Indications: pain   Procedure Details  Location: knee - L knee  Preparation: Patient was prepped and draped in the usual sterile fashion  Needle size: 22 G  Approach: anterolateral  Medications administered: 30 mg sodium hyaluronate 30 mg/2 mL    Patient tolerance: patient tolerated the procedure well with no immediate complications  Dressing:  Sterile dressing applied

## 2018-08-28 ENCOUNTER — OFFICE VISIT (OUTPATIENT)
Dept: OBGYN CLINIC | Facility: CLINIC | Age: 73
End: 2018-08-28
Payer: COMMERCIAL

## 2018-08-28 VITALS
WEIGHT: 192 LBS | SYSTOLIC BLOOD PRESSURE: 118 MMHG | BODY MASS INDEX: 26.88 KG/M2 | HEART RATE: 82 BPM | HEIGHT: 71 IN | DIASTOLIC BLOOD PRESSURE: 70 MMHG

## 2018-08-28 DIAGNOSIS — M17.11 PRIMARY LOCALIZED OSTEOARTHRITIS OF RIGHT KNEE: Primary | ICD-10-CM

## 2018-08-28 DIAGNOSIS — M17.12 PRIMARY LOCALIZED OSTEOARTHRITIS OF LEFT KNEE: ICD-10-CM

## 2018-08-28 PROCEDURE — 20610 DRAIN/INJ JOINT/BURSA W/O US: CPT | Performed by: PHYSICIAN ASSISTANT

## 2018-08-28 NOTE — PROGRESS NOTES
Assessment:     1  Primary localized osteoarthritis of right knee    2  Primary localized osteoarthritis of left knee        Plan:     Problem List Items Addressed This Visit        Musculoskeletal and Integument    Primary localized osteoarthritis of right knee - Primary    Relevant Medications    sodium hyaluronate (ORTHOVISC) injection SOSY 30 mg (Completed)    Other Relevant Orders    Large joint arthrocentesis (Completed)    Primary localized osteoarthritis of left knee    Relevant Medications    sodium hyaluronate (ORTHOVISC) injection SOSY 30 mg (Completed)    Other Relevant Orders    Large joint arthrocentesis (Completed)          Findings consistent with bilateral knee osteoarthritis  The 2nd of 3 bilateral knee Orthovisc injections were given today  Patient tolerated the procedure well  Advised to apply cold compress today  Follow-up in 1 week for the 3rd injections  All questions were answered to patient's satisfaction  Subjective:     Patient ID: Lulu Gtz is a 71 y o  male  Chief Complaint: This is a 46-year-old white male following up for bilateral knee osteoarthritis  He is here for the 2nd of 3 bilateral knee Orthovisc injections  No issues after the 1st injections  He states he is considering having the total knee replacements in January 2018        Allergy:  No Known Allergies  Medications:  all current active meds have been reviewed  Past Medical History:  Past Medical History:   Diagnosis Date    Atrial fibrillation ([address] )     Hx of CABG     Nicotine dependence     Pneumonia      Past Surgical History:  Past Surgical History:   Procedure Laterality Date    AORTIC VALVE REPLACEMENT      COLECTOMY      COLONOSCOPY      DENTAL SURGERY      KNEE SURGERY      LYMPHADENECTOMY      OTHER SURGICAL HISTORY      MAZE PROCEDURE     Family History:  Family History   Problem Relation Age of Onset    Heart block Family      Social History:  History   Alcohol Use    Yes     History Drug use: Unknown     History   Smoking Status    Former Smoker   Smokeless Tobacco    Never Used     Review of Systems   Constitutional: Negative  HENT: Negative  Eyes: Negative  Respiratory: Negative  Cardiovascular: Negative  Gastrointestinal: Negative  Endocrine: Negative  Genitourinary: Negative  Musculoskeletal: Positive for arthralgias  Skin: Negative  Allergic/Immunologic: Negative  Neurological: Negative  Hematological: Negative  Psychiatric/Behavioral: Negative  Objective:  BP Readings from Last 1 Encounters:   08/28/18 118/70      Wt Readings from Last 1 Encounters:   08/28/18 87 1 kg (192 lb)      BMI:   Estimated body mass index is 26 78 kg/m² as calculated from the following:    Height as of this encounter: 5' 11" (1 803 m)  Weight as of this encounter: 87 1 kg (192 lb)  BSA:   Estimated body surface area is 2 07 meters squared as calculated from the following:    Height as of this encounter: 5' 11" (1 803 m)  Weight as of this encounter: 87 1 kg (192 lb)  Physical Exam   Constitutional: He is oriented to person, place, and time  He appears well-developed  HENT:   Head: Normocephalic and atraumatic  Eyes: EOM are normal  Pupils are equal, round, and reactive to light  Neck: Neck supple  Musculoskeletal:        Right knee: He exhibits no effusion  Left knee: He exhibits no effusion  Neurological: He is alert and oriented to person, place, and time  Skin: Skin is warm  Psychiatric: He has a normal mood and affect  Nursing note and vitals reviewed  Right Knee Exam     Tenderness   The patient is experiencing no tenderness  Range of Motion   The patient has normal right knee ROM  Muscle Strength     The patient has normal right knee strength      Tests   Varus: negative  Valgus: negative    Other   Erythema: absent  Sensation: normal  Pulse: present  Swelling: none  Other tests: no effusion present    Comments: Genu varum  Joint hypertrophy  Patellofemoral crepitation      Left Knee Exam     Tenderness   The patient is experiencing no tenderness  Range of Motion   The patient has normal left knee ROM  Muscle Strength     The patient has normal left knee strength      Tests   Varus: negative  Valgus: negative    Other   Erythema: absent  Sensation: normal  Pulse: present  Swelling: none  Effusion: no effusion present    Comments:  Genu varum  Joint hypertrophy  Patellofemoral crepitation              Large joint arthrocentesis  Date/Time: 8/28/2018 9:18 AM  Consent given by: patient  Site marked: site marked  Timeout: Immediately prior to procedure a time out was called to verify the correct patient, procedure, equipment, support staff and site/side marked as required   Supporting Documentation  Indications: pain   Procedure Details  Location: knee - R knee  Preparation: Patient was prepped and draped in the usual sterile fashion  Needle size: 22 G  Approach: anterolateral  Medications administered: 30 mg sodium hyaluronate 30 mg/2 mL    Patient tolerance: patient tolerated the procedure well with no immediate complications  Dressing:  Sterile dressing applied  Large joint arthrocentesis  Date/Time: 8/28/2018 9:18 AM  Consent given by: patient  Site marked: site marked  Timeout: Immediately prior to procedure a time out was called to verify the correct patient, procedure, equipment, support staff and site/side marked as required   Supporting Documentation  Indications: pain   Procedure Details  Location: knee - L knee  Preparation: Patient was prepped and draped in the usual sterile fashion  Needle size: 22 G  Approach: anterolateral  Medications administered: 30 mg sodium hyaluronate 30 mg/2 mL    Patient tolerance: patient tolerated the procedure well with no immediate complications  Dressing:  Sterile dressing applied

## 2018-08-29 LAB
EST. AVERAGE GLUCOSE BLD GHB EST-MCNC: 157 MG/DL
GLUCOSE SERPL-MCNC: 168 MG/DL (ref 65–99)
HBA1C MFR BLD: 7.1 % (ref 4.8–5.6)
TSH SERPL DL<=0.005 MIU/L-ACNC: 3.81 UIU/ML (ref 0.45–4.5)

## 2018-08-29 PROCEDURE — 3045F PR MOST RECENT HEMOGLOBIN A1C LEVEL 7.0-9.0%: CPT | Performed by: INTERNAL MEDICINE

## 2018-08-31 ENCOUNTER — OFFICE VISIT (OUTPATIENT)
Dept: OBGYN CLINIC | Facility: CLINIC | Age: 73
End: 2018-08-31
Payer: COMMERCIAL

## 2018-08-31 VITALS
DIASTOLIC BLOOD PRESSURE: 80 MMHG | BODY MASS INDEX: 26.88 KG/M2 | WEIGHT: 192 LBS | HEIGHT: 71 IN | SYSTOLIC BLOOD PRESSURE: 120 MMHG | HEART RATE: 90 BPM

## 2018-08-31 DIAGNOSIS — M17.12 PRIMARY LOCALIZED OSTEOARTHRITIS OF LEFT KNEE: ICD-10-CM

## 2018-08-31 DIAGNOSIS — M17.11 PRIMARY LOCALIZED OSTEOARTHRITIS OF RIGHT KNEE: Primary | ICD-10-CM

## 2018-08-31 PROCEDURE — 20610 DRAIN/INJ JOINT/BURSA W/O US: CPT | Performed by: PHYSICIAN ASSISTANT

## 2018-08-31 NOTE — PROGRESS NOTES
Assessment:     1  Primary localized osteoarthritis of right knee    2  Primary localized osteoarthritis of left knee        Plan:     Problem List Items Addressed This Visit        Musculoskeletal and Integument    Primary localized osteoarthritis of right knee - Primary    Relevant Medications    sodium hyaluronate (ORTHOVISC) injection SOSY 30 mg (Completed)    Other Relevant Orders    Large joint arthrocentesis (Completed)    Primary localized osteoarthritis of left knee    Relevant Medications    sodium hyaluronate (ORTHOVISC) injection SOSY 30 mg (Completed)    Other Relevant Orders    Large joint arthrocentesis (Completed)          The patient was seen and examined by Dr Jonah Rose and myself  Findings consistent with bilateral knee osteoarthritis  The 3rd of 3 bilateral knee Orthovisc injections were given today  Patient tolerated the procedure well  Advised to apply cold compress today  Dr Jonah Rose had discussion with patient regarding total knee arthroplasty in the future if needed  Follow-up in 3 months for re-evaluation  All questions were answered to patient's satisfaction  Subjective:     Patient ID: Sudhakar Benjamin is a 71 y o  male  Chief Complaint: This is a 56-year-old white male following up for bilateral knee osteoarthritis  He is here for the 3rd of 3 bilateral knee Orthovisc injections  He is starting to feel relief from the injections        Allergy:  No Known Allergies  Medications:  all current active meds have been reviewed  Past Medical History:  Past Medical History:   Diagnosis Date    Atrial fibrillation ([address] )     Hx of CABG     Nicotine dependence     Pneumonia      Past Surgical History:  Past Surgical History:   Procedure Laterality Date    AORTIC VALVE REPLACEMENT      COLECTOMY      COLONOSCOPY      DENTAL SURGERY      KNEE SURGERY      LYMPHADENECTOMY      OTHER SURGICAL HISTORY      MAZE PROCEDURE     Family History:  Family History   Problem Relation Age of Onset    Heart block Family      Social History:  History   Alcohol Use    Yes     History   Drug use: Unknown     History   Smoking Status    Former Smoker   Smokeless Tobacco    Never Used     Review of Systems   Constitutional: Negative  HENT: Negative  Eyes: Negative  Respiratory: Negative  Cardiovascular: Negative  Gastrointestinal: Negative  Endocrine: Negative  Genitourinary: Negative  Musculoskeletal: Positive for arthralgias  Skin: Negative  Allergic/Immunologic: Negative  Neurological: Negative  Hematological: Negative  Psychiatric/Behavioral: Negative  Objective:  BP Readings from Last 1 Encounters:   08/31/18 120/80      Wt Readings from Last 1 Encounters:   08/31/18 87 1 kg (192 lb)      BMI:   Estimated body mass index is 26 78 kg/m² as calculated from the following:    Height as of this encounter: 5' 11" (1 803 m)  Weight as of this encounter: 87 1 kg (192 lb)  BSA:   Estimated body surface area is 2 07 meters squared as calculated from the following:    Height as of this encounter: 5' 11" (1 803 m)  Weight as of this encounter: 87 1 kg (192 lb)  Physical Exam   Constitutional: He is oriented to person, place, and time  He appears well-developed  HENT:   Head: Normocephalic and atraumatic  Eyes: EOM are normal  Pupils are equal, round, and reactive to light  Neck: Neck supple  Musculoskeletal:        Right knee: He exhibits no effusion  Left knee: He exhibits no effusion  Neurological: He is alert and oriented to person, place, and time  Skin: Skin is warm  Psychiatric: He has a normal mood and affect  Nursing note and vitals reviewed  Right Knee Exam     Tenderness   The patient is experiencing no tenderness  Range of Motion   The patient has normal right knee ROM  Muscle Strength     The patient has normal right knee strength      Tests   Varus: negative  Valgus: negative    Other   Erythema: absent  Sensation: normal  Pulse: present  Swelling: none  Other tests: no effusion present    Comments:  Genu varum  Joint hypertrophy  Patellofemoral crepitation      Left Knee Exam     Tenderness   The patient is experiencing no tenderness  Range of Motion   The patient has normal left knee ROM  Muscle Strength     The patient has normal left knee strength      Tests   Varus: negative  Valgus: negative    Other   Erythema: absent  Sensation: normal  Pulse: present  Swelling: none  Effusion: no effusion present    Comments:  Genu varum  Joint hypertrophy  Patellofemoral crepitation              Large joint arthrocentesis  Date/Time: 8/31/2018 2:42 PM  Consent given by: patient  Site marked: site marked  Timeout: Immediately prior to procedure a time out was called to verify the correct patient, procedure, equipment, support staff and site/side marked as required   Supporting Documentation  Indications: pain   Procedure Details  Location: knee - R knee  Preparation: Patient was prepped and draped in the usual sterile fashion  Needle size: 22 G  Approach: anterolateral  Medications administered: 30 mg sodium hyaluronate 30 mg/2 mL    Patient tolerance: patient tolerated the procedure well with no immediate complications  Dressing:  Sterile dressing applied  Large joint arthrocentesis  Date/Time: 8/31/2018 2:43 PM  Consent given by: patient  Site marked: site marked  Timeout: Immediately prior to procedure a time out was called to verify the correct patient, procedure, equipment, support staff and site/side marked as required   Supporting Documentation  Indications: pain   Procedure Details  Location: knee - L knee  Preparation: Patient was prepped and draped in the usual sterile fashion  Needle size: 22 G  Approach: anterolateral  Medications administered: 30 mg sodium hyaluronate 30 mg/2 mL    Patient tolerance: patient tolerated the procedure well with no immediate complications  Dressing:  Sterile dressing applied

## 2018-09-04 ENCOUNTER — OFFICE VISIT (OUTPATIENT)
Dept: FAMILY MEDICINE CLINIC | Facility: HOSPITAL | Age: 73
End: 2018-09-04
Payer: COMMERCIAL

## 2018-09-04 VITALS
HEIGHT: 70 IN | DIASTOLIC BLOOD PRESSURE: 62 MMHG | TEMPERATURE: 97.8 F | HEART RATE: 70 BPM | WEIGHT: 193 LBS | SYSTOLIC BLOOD PRESSURE: 122 MMHG | BODY MASS INDEX: 27.63 KG/M2

## 2018-09-04 DIAGNOSIS — C18.0 MALIGNANT TUMOR OF CECUM (HCC): ICD-10-CM

## 2018-09-04 DIAGNOSIS — E11.9 TYPE 2 DIABETES MELLITUS WITHOUT COMPLICATION, WITHOUT LONG-TERM CURRENT USE OF INSULIN (HCC): Primary | ICD-10-CM

## 2018-09-04 DIAGNOSIS — M17.0 PRIMARY OSTEOARTHRITIS OF BOTH KNEES: ICD-10-CM

## 2018-09-04 DIAGNOSIS — I10 ESSENTIAL HYPERTENSION: ICD-10-CM

## 2018-09-04 DIAGNOSIS — C90.00 MULTIPLE MYELOMA NOT HAVING ACHIEVED REMISSION (HCC): ICD-10-CM

## 2018-09-04 PROBLEM — I48.91 ATRIAL FIBRILLATION (HCC): Status: RESOLVED | Noted: 2018-09-04 | Resolved: 2018-09-04

## 2018-09-04 PROCEDURE — 99214 OFFICE O/P EST MOD 30 MIN: CPT | Performed by: INTERNAL MEDICINE

## 2018-09-04 PROCEDURE — 3078F DIAST BP <80 MM HG: CPT | Performed by: INTERNAL MEDICINE

## 2018-09-04 PROCEDURE — 3074F SYST BP LT 130 MM HG: CPT | Performed by: INTERNAL MEDICINE

## 2018-09-04 NOTE — PROGRESS NOTES
Assessment/Plan:    Malignant tumor of cecum ([email or web address] )  Due for colonoscopy - number for [facility] given as he wishes to have procedure done more locally - is having some recent diarrhea but seems to be slowly improving, call F/C/N/V/abd pain/blood in stool    DM type 2 (diabetes mellitus, type 2) ([facility] [[email or web address] 75 )  Lab Results   Component Value Date    HGBA1C 7 1 (H) 08/28/2018       No results for input(s): POCGLU in the last 72 hours  Blood Sugar Average: Last 72 hrs:does not check sugars  DM type 2 with hyperglycemia - uncontrolled but improved with A1C 7 1 - urged to con't watching sugars/carbs and increase activity level as tolerated, recheck BW in about 4 mos - order given, con't current Metformin, will get copy of eye exam from [facility] in [facility] (due again next month as per pt) and foot exam is UTD (Nov 2017), on ARB and statin and ASA      Hypertension  BP at goal, con't current meds    Primary osteoarthritis of both knees  S/p Orthovisc with improvement - cont' f/u as per Ortho    Multiple myeloma not having achieved remission ([facility] [[email or web address] 75 )  Finished radiation and has had neg testing thus far - cont' f/u as per Onc       Diagnoses and all orders for this visit:    Type 2 diabetes mellitus without complication, without long-term current use of insulin (HCC)  -     Glucose, fasting; Future  -     Hemoglobin A1C; Future  -     Glucose, fasting  -     Hemoglobin A1C    Primary osteoarthritis of both knees    Multiple myeloma not having achieved remission (HCC)    Malignant tumor of cecum ([place] [id number] )  -     Ambulatory referral to Gastroenterology; Future    Essential hypertension      [place] done 3/12/15 - 3 yr follow up     Subjective:      Patient ID: Amita Calderón is a 71 y o  male  HPI  Pt here for follow up appt and BW results    BW results were d/w pt in detail: FBS/A1C better but still up a bit at 168/7 1, TSH wnl  Def of controlled vs uncontrolled DM was reviewed  Diet was reviewed - wgt up 1 lb from May 2018    He does not check his sugars at home  He is watching his sugars and carbs and only has rare "splurges"  He does no regular exercise  He is taking his DM meds as directed  He notes no SE with the medication  He is UTD on foot (11/17) and is due for an eye exam (11/16)  Pt states he saw eye doc regularly - [address] 56 in [facility]  He is on statin and ARB  Pt has been following with ortho for his continued knee pain  He has just finished h is 3rd Orthovisc injection  He has had improvement with the injections  He has not had to take oral daily meds  He finished his radiation for his MM - finished July 3rd  He notes his R eye still weeps and his nose runs but it is slowly improving  He had some recent testing done and was told every thing was negative and to f/u in 6 mos  [place] 3/12/15 - 3 yr follow up due to history of colon CA  He is going to schedule now that radiation has finished - asking for number of GI locally  He has had some diarrhea intermittently  He notes no blood in the stool/F/C/abd pain  He notes increase in gas  BP at goal today and meds were reviewed and no changes have occurred  He denies missing doses of meds or SE with the meds  He does not check his BP outside the office  He notes no frequent Ha's/dizziness/double vision/CP  Review of Systems   Constitutional: Negative for chills, fatigue, fever and unexpected weight change  HENT: Positive for rhinorrhea  Negative for congestion and sinus pain  Eyes: Positive for discharge  Negative for pain, redness and visual disturbance  Respiratory: Negative for cough, chest tightness and shortness of breath  Cardiovascular: Negative for chest pain, palpitations and leg swelling  Gastrointestinal: Positive for diarrhea  Negative for abdominal pain, blood in stool, constipation, nausea and vomiting  Endocrine: Negative for polydipsia and polyuria     Genitourinary: Negative for difficulty urinating and dysuria  Musculoskeletal: Positive for arthralgias  Negative for myalgias  Skin: Negative for rash and wound  Neurological: Negative for dizziness and headaches  Hematological: Does not bruise/bleed easily  Psychiatric/Behavioral: Negative for behavioral problems and confusion  Objective:    /62   Pulse 70   Temp 97 8 °F (36 6 °C)   Ht 5' 10" (1 778 m)   Wt 87 5 kg (193 lb)   BMI 27 69 kg/m²        Physical Exam   Constitutional: He appears well-developed and well-nourished  No distress  HENT:   Head: Normocephalic and atraumatic  Mouth/Throat: No oropharyngeal exudate  Eyes: Conjunctivae are normal  Right eye exhibits no discharge  Left eye exhibits no discharge  Neck: Neck supple  No tracheal deviation present  Cardiovascular: Normal rate and regular rhythm  No murmur heard  Pulmonary/Chest: Effort normal and breath sounds normal  No respiratory distress  He has no wheezes  He has no rales  Abdominal: Soft  He exhibits no distension  There is no tenderness  Musculoskeletal: He exhibits no deformity  Neurological: He is alert  He exhibits normal muscle tone  Skin: Skin is warm and dry  No rash noted  Psychiatric: He has a normal mood and affect  His behavior is normal    Nursing note and vitals reviewed

## 2018-09-04 NOTE — ASSESSMENT & PLAN NOTE
Due for colonoscopy - number for [facility] given as he wishes to have procedure done more locally - is having some recent diarrhea but seems to be slowly improving, call F/C/N/V/abd pain/blood in stool

## 2018-10-12 ENCOUNTER — OFFICE VISIT (OUTPATIENT)
Dept: FAMILY MEDICINE CLINIC | Facility: HOSPITAL | Age: 73
End: 2018-10-12
Payer: COMMERCIAL

## 2018-10-12 VITALS
TEMPERATURE: 99.7 F | HEIGHT: 70 IN | DIASTOLIC BLOOD PRESSURE: 72 MMHG | SYSTOLIC BLOOD PRESSURE: 120 MMHG | BODY MASS INDEX: 27.06 KG/M2 | WEIGHT: 189 LBS | HEART RATE: 89 BPM

## 2018-10-12 DIAGNOSIS — R50.9 FEVER, UNSPECIFIED FEVER CAUSE: ICD-10-CM

## 2018-10-12 DIAGNOSIS — J01.90 ACUTE NON-RECURRENT SINUSITIS, UNSPECIFIED LOCATION: Primary | ICD-10-CM

## 2018-10-12 PROCEDURE — 99213 OFFICE O/P EST LOW 20 MIN: CPT | Performed by: INTERNAL MEDICINE

## 2018-10-12 PROCEDURE — 1160F RVW MEDS BY RX/DR IN RCRD: CPT | Performed by: INTERNAL MEDICINE

## 2018-10-12 RX ORDER — DOXYCYCLINE HYCLATE 100 MG/1
100 CAPSULE ORAL EVERY 12 HOURS SCHEDULED
Qty: 14 CAPSULE | Refills: 0 | Status: SHIPPED | OUTPATIENT
Start: 2018-10-12 | End: 2018-10-19

## 2018-10-12 NOTE — PROGRESS NOTES
Assessment/Plan:       Diagnoses and all orders for this visit:    Acute non-recurrent sinusitis, unspecified location  Comments:  D/t duration of symptoms with con't low grade fevers will start abx - Doxy sent to pharmacy, encouraged to con't OTC decongestant/cough med/gargles/lozenges; d/w pt that cough can last 4-6 wks but call with new/worse symptoms or if no better at all by end of abx  Orders:  -     doxycycline hyclate (VIBRAMYCIN) 100 mg capsule; Take 1 capsule (100 mg total) by mouth every 12 (twelve) hours for 7 days    Fever, unspecified fever cause  Comments:  Comes down nicely with Tyelnol or Ibuprofen, will check influenza, tx sinusitis as noted above  Orders:  -     doxycycline hyclate (VIBRAMYCIN) 100 mg capsule; Take 1 capsule (100 mg total) by mouth every 12 (twelve) hours for 7 days  -     Influenza A/B and RSV by PCR; Future          Subjective:      Patient ID: Trey Kelly is a 71 y o  male  HPI Pt here with 10 days of body aches and fever (Tm 100 8)  He no ST or ear pain  He has nasal congestion, runny nose and cough productive of phlegm  He denies SOB/wheezing  He notes no sinus pain but has some frontal pressure and a few HA's  He notes no N/V/D/abd pain/Urinary symptoms/rashes  He has had sick contacts with his wife at home with a cold  He has been using Tylenol and Coricidin and Nyquil  He had his chemo port removed the day prior but states the site has been w/o redness/warmth/drainage  He has not had chemo tx since 2016  Review of Systems   Constitutional: Positive for chills, fatigue and fever  HENT: Positive for congestion  Negative for ear pain and sore throat  Respiratory: Positive for cough  Negative for shortness of breath and stridor  Gastrointestinal: Negative for abdominal pain, diarrhea, nausea and vomiting  Genitourinary: Negative for dysuria  Musculoskeletal: Positive for arthralgias and myalgias  Skin: Negative for rash     Neurological: Positive for headaches  Objective:    /72   Pulse 89   Temp 99 7 °F (37 6 °C)   Ht 5' 10" (1 778 m)   Wt 85 7 kg (189 lb)   BMI 27 12 kg/m²      Physical Exam   Constitutional: He appears well-developed and well-nourished  No distress  HENT:   Head: Normocephalic and atraumatic  Right Ear: External ear normal    Left Ear: External ear normal    Mouth/Throat: No oropharyngeal exudate  Mild post pharyngeal erythema   Eyes: Conjunctivae are normal  Right eye exhibits no discharge  Left eye exhibits no discharge  Neck: Neck supple  No tracheal deviation present  Cardiovascular: Normal rate and regular rhythm  No murmur heard  Pulmonary/Chest: Effort normal and breath sounds normal  No respiratory distress  He has no wheezes  He has no rales  Musculoskeletal: He exhibits no deformity  Lymphadenopathy:     He has no cervical adenopathy  Neurological: He is alert  He exhibits normal muscle tone  Skin: Skin is warm and dry  No rash noted  Psychiatric: He has a normal mood and affect  His behavior is normal    Nursing note and vitals reviewed

## 2018-10-16 LAB
FLUAV RNA SPEC QL NAA+PROBE: NEGATIVE
FLUBV RNA SPEC QL NAA+PROBE: NEGATIVE

## 2018-11-16 DIAGNOSIS — E78.5 DYSLIPIDEMIA: ICD-10-CM

## 2018-11-16 RX ORDER — PRAVASTATIN SODIUM 40 MG
TABLET ORAL
Qty: 30 TABLET | Refills: 6 | Status: SHIPPED | OUTPATIENT
Start: 2018-11-16 | End: 2019-07-13 | Stop reason: SDUPTHER

## 2018-11-20 DIAGNOSIS — J18.9 PNEUMONIA OF LEFT LUNG DUE TO INFECTIOUS ORGANISM, UNSPECIFIED PART OF LUNG: Primary | ICD-10-CM

## 2018-11-20 RX ORDER — CEFUROXIME AXETIL 500 MG/1
500 TABLET ORAL EVERY 12 HOURS SCHEDULED
Qty: 14 TABLET | Refills: 0 | Status: SHIPPED | OUTPATIENT
Start: 2018-11-20 | End: 2018-11-23 | Stop reason: SINTOL

## 2018-11-23 ENCOUNTER — TELEPHONE (OUTPATIENT)
Dept: FAMILY MEDICINE CLINIC | Facility: HOSPITAL | Age: 73
End: 2018-11-23

## 2018-11-23 DIAGNOSIS — J18.9 PNEUMONIA DUE TO INFECTIOUS ORGANISM, UNSPECIFIED LATERALITY, UNSPECIFIED PART OF LUNG: Primary | ICD-10-CM

## 2018-11-23 RX ORDER — LEVOFLOXACIN 750 MG/1
750 TABLET ORAL EVERY 24 HOURS
Qty: 5 TABLET | Refills: 0 | Status: SHIPPED | OUTPATIENT
Start: 2018-11-23 | End: 2018-11-28 | Stop reason: HOSPADM

## 2018-11-23 NOTE — TELEPHONE ENCOUNTER
Has pneumonia  Took the antibiotic we prescribed and he is now having itching from it  Wants to know if Dr Lola Edwards can call something else in

## 2018-11-23 NOTE — TELEPHONE ENCOUNTER
Stop the current abx - Cefuroxime - and start new abx sent to  Pharmacy - levofloxacin 750 mg 1 tab PO q day - please add Cefuroxime to allergy list with reaction as itching   TY

## 2018-11-25 ENCOUNTER — HOSPITAL ENCOUNTER (INPATIENT)
Facility: HOSPITAL | Age: 73
LOS: 3 days | Discharge: HOME | DRG: 682 | End: 2018-11-28
Attending: EMERGENCY MEDICINE | Admitting: INTERNAL MEDICINE
Payer: COMMERCIAL

## 2018-11-25 ENCOUNTER — APPOINTMENT (EMERGENCY)
Dept: CT IMAGING | Facility: HOSPITAL | Age: 73
DRG: 682 | End: 2018-11-25
Payer: COMMERCIAL

## 2018-11-25 DIAGNOSIS — R19.7 DIARRHEA, UNSPECIFIED TYPE: ICD-10-CM

## 2018-11-25 DIAGNOSIS — I10 ESSENTIAL HYPERTENSION: ICD-10-CM

## 2018-11-25 DIAGNOSIS — R11.2 NAUSEA AND VOMITING, INTRACTABILITY OF VOMITING NOT SPECIFIED, UNSPECIFIED VOMITING TYPE: ICD-10-CM

## 2018-11-25 DIAGNOSIS — K52.9 COLITIS: Primary | ICD-10-CM

## 2018-11-25 DIAGNOSIS — J18.9 PNEUMONIA: ICD-10-CM

## 2018-11-25 DIAGNOSIS — N17.9 AKI (ACUTE KIDNEY INJURY) (HCC): ICD-10-CM

## 2018-11-25 DIAGNOSIS — D72.829 LEUKOCYTOSIS, UNSPECIFIED TYPE: ICD-10-CM

## 2018-11-25 DIAGNOSIS — E11.9 TYPE 2 DIABETES MELLITUS WITHOUT COMPLICATION, WITHOUT LONG-TERM CURRENT USE OF INSULIN (HCC): ICD-10-CM

## 2018-11-25 LAB
ALBUMIN SERPL BCP-MCNC: 3.3 G/DL (ref 3.5–5)
ALP SERPL-CCNC: 90 U/L (ref 46–116)
ALT SERPL W P-5'-P-CCNC: 24 U/L (ref 12–78)
ANION GAP SERPL CALCULATED.3IONS-SCNC: 16 MMOL/L (ref 4–13)
AST SERPL W P-5'-P-CCNC: 20 U/L (ref 5–45)
BASOPHILS # BLD MANUAL: 0 THOUSAND/UL (ref 0–0.1)
BASOPHILS NFR MAR MANUAL: 0 % (ref 0–1)
BILIRUB SERPL-MCNC: 0.5 MG/DL (ref 0.2–1)
BUN SERPL-MCNC: 40 MG/DL (ref 5–25)
CALCIUM SERPL-MCNC: 9.3 MG/DL (ref 8.3–10.1)
CHLORIDE SERPL-SCNC: 93 MMOL/L (ref 100–108)
CLARITY, POC: CLEAR
CO2 SERPL-SCNC: 22 MMOL/L (ref 21–32)
COLOR, POC: YELLOW
CREAT SERPL-MCNC: 1.64 MG/DL (ref 0.6–1.3)
EOSINOPHIL # BLD MANUAL: 0 THOUSAND/UL (ref 0–0.4)
EOSINOPHIL NFR BLD MANUAL: 0 % (ref 0–6)
ERYTHROCYTE [DISTWIDTH] IN BLOOD BY AUTOMATED COUNT: 13.7 % (ref 11.6–15.1)
EXT BILIRUBIN, UA: NORMAL
EXT BLOOD URINE: NORMAL
EXT GLUCOSE, UA: 100
EXT KETONES: NORMAL
EXT NITRITE, UA: NORMAL
EXT PH, UA: 6
EXT PROTEIN, UA: NORMAL
EXT SPECIFIC GRAVITY, UA: 1.01
EXT UROBILINOGEN: 0.2
GFR SERPL CREATININE-BSD FRML MDRD: 41 ML/MIN/1.73SQ M
GLUCOSE SERPL-MCNC: 218 MG/DL (ref 65–140)
GLUCOSE SERPL-MCNC: 306 MG/DL (ref 65–140)
HCT VFR BLD AUTO: 42.4 % (ref 36.5–49.3)
HGB BLD-MCNC: 13.9 G/DL (ref 12–17)
LACTATE SERPL-SCNC: 1.4 MMOL/L (ref 0.5–2)
LIPASE SERPL-CCNC: 173 U/L (ref 73–393)
LYMPHOCYTES # BLD AUTO: 2.63 THOUSAND/UL (ref 0.6–4.47)
LYMPHOCYTES # BLD AUTO: 9 % (ref 14–44)
MAGNESIUM SERPL-MCNC: 1.8 MG/DL (ref 1.6–2.6)
MCH RBC QN AUTO: 28.5 PG (ref 26.8–34.3)
MCHC RBC AUTO-ENTMCNC: 32.8 G/DL (ref 31.4–37.4)
MCV RBC AUTO: 87 FL (ref 82–98)
MONOCYTES # BLD AUTO: 1.17 THOUSAND/UL (ref 0–1.22)
MONOCYTES NFR BLD: 4 % (ref 4–12)
NEUTROPHILS # BLD MANUAL: 25.14 THOUSAND/UL (ref 1.85–7.62)
NEUTS BAND NFR BLD MANUAL: 3 % (ref 0–8)
NEUTS SEG NFR BLD AUTO: 83 % (ref 43–75)
NRBC BLD AUTO-RTO: 0 /100 WBCS
OVALOCYTES BLD QL SMEAR: PRESENT
PLATELET # BLD AUTO: 513 THOUSANDS/UL (ref 149–390)
PLATELET BLD QL SMEAR: ABNORMAL
PMV BLD AUTO: 9.5 FL (ref 8.9–12.7)
POIKILOCYTOSIS BLD QL SMEAR: PRESENT
POTASSIUM SERPL-SCNC: 4.6 MMOL/L (ref 3.5–5.3)
PROT SERPL-MCNC: 7.6 G/DL (ref 6.4–8.2)
RBC # BLD AUTO: 4.88 MILLION/UL (ref 3.88–5.62)
SODIUM SERPL-SCNC: 131 MMOL/L (ref 136–145)
TOTAL CELLS COUNTED SPEC: 100
VARIANT LYMPHS # BLD AUTO: 1 %
WBC # BLD AUTO: 29.23 THOUSAND/UL (ref 4.31–10.16)
WBC # BLD EST: NORMAL 10*3/UL

## 2018-11-25 PROCEDURE — 96374 THER/PROPH/DIAG INJ IV PUSH: CPT

## 2018-11-25 PROCEDURE — 83605 ASSAY OF LACTIC ACID: CPT | Performed by: NURSE PRACTITIONER

## 2018-11-25 PROCEDURE — 96361 HYDRATE IV INFUSION ADD-ON: CPT

## 2018-11-25 PROCEDURE — 83690 ASSAY OF LIPASE: CPT | Performed by: EMERGENCY MEDICINE

## 2018-11-25 PROCEDURE — 36415 COLL VENOUS BLD VENIPUNCTURE: CPT | Performed by: EMERGENCY MEDICINE

## 2018-11-25 PROCEDURE — 85007 BL SMEAR W/DIFF WBC COUNT: CPT | Performed by: EMERGENCY MEDICINE

## 2018-11-25 PROCEDURE — 81002 URINALYSIS NONAUTO W/O SCOPE: CPT | Performed by: NURSE PRACTITIONER

## 2018-11-25 PROCEDURE — 85027 COMPLETE CBC AUTOMATED: CPT | Performed by: EMERGENCY MEDICINE

## 2018-11-25 PROCEDURE — 87040 BLOOD CULTURE FOR BACTERIA: CPT | Performed by: NURSE PRACTITIONER

## 2018-11-25 PROCEDURE — 82948 REAGENT STRIP/BLOOD GLUCOSE: CPT

## 2018-11-25 PROCEDURE — 83735 ASSAY OF MAGNESIUM: CPT | Performed by: NURSE PRACTITIONER

## 2018-11-25 PROCEDURE — 74177 CT ABD & PELVIS W/CONTRAST: CPT

## 2018-11-25 PROCEDURE — 71260 CT THORAX DX C+: CPT

## 2018-11-25 PROCEDURE — 99223 1ST HOSP IP/OBS HIGH 75: CPT | Performed by: PHYSICIAN ASSISTANT

## 2018-11-25 PROCEDURE — 99285 EMERGENCY DEPT VISIT HI MDM: CPT

## 2018-11-25 PROCEDURE — 80053 COMPREHEN METABOLIC PANEL: CPT | Performed by: EMERGENCY MEDICINE

## 2018-11-25 RX ORDER — LEVOFLOXACIN 5 MG/ML
750 INJECTION, SOLUTION INTRAVENOUS
Status: DISCONTINUED | OUTPATIENT
Start: 2018-11-25 | End: 2018-11-26

## 2018-11-25 RX ORDER — SODIUM CHLORIDE 9 MG/ML
100 INJECTION, SOLUTION INTRAVENOUS CONTINUOUS
Status: DISCONTINUED | OUTPATIENT
Start: 2018-11-25 | End: 2018-11-28

## 2018-11-25 RX ORDER — ACETAMINOPHEN 325 MG/1
650 TABLET ORAL EVERY 6 HOURS PRN
Status: DISCONTINUED | OUTPATIENT
Start: 2018-11-25 | End: 2018-11-28 | Stop reason: HOSPADM

## 2018-11-25 RX ORDER — HEPARIN SODIUM 5000 [USP'U]/ML
5000 INJECTION, SOLUTION INTRAVENOUS; SUBCUTANEOUS EVERY 8 HOURS SCHEDULED
Status: DISCONTINUED | OUTPATIENT
Start: 2018-11-25 | End: 2018-11-28 | Stop reason: HOSPADM

## 2018-11-25 RX ORDER — LOSARTAN POTASSIUM 50 MG/1
100 TABLET ORAL DAILY
Status: DISCONTINUED | OUTPATIENT
Start: 2018-11-26 | End: 2018-11-28 | Stop reason: HOSPADM

## 2018-11-25 RX ORDER — PRAVASTATIN SODIUM 40 MG
40 TABLET ORAL
Status: DISCONTINUED | OUTPATIENT
Start: 2018-11-25 | End: 2018-11-28 | Stop reason: HOSPADM

## 2018-11-25 RX ORDER — METOPROLOL SUCCINATE 50 MG/1
100 TABLET, EXTENDED RELEASE ORAL DAILY
Status: DISCONTINUED | OUTPATIENT
Start: 2018-11-26 | End: 2018-11-28 | Stop reason: HOSPADM

## 2018-11-25 RX ORDER — ONDANSETRON 2 MG/ML
4 INJECTION INTRAMUSCULAR; INTRAVENOUS ONCE
Status: COMPLETED | OUTPATIENT
Start: 2018-11-25 | End: 2018-11-25

## 2018-11-25 RX ORDER — ONDANSETRON 2 MG/ML
4 INJECTION INTRAMUSCULAR; INTRAVENOUS EVERY 6 HOURS PRN
Status: DISCONTINUED | OUTPATIENT
Start: 2018-11-25 | End: 2018-11-28 | Stop reason: HOSPADM

## 2018-11-25 RX ORDER — CALCIUM CARBONATE 200(500)MG
1000 TABLET,CHEWABLE ORAL DAILY PRN
Status: DISCONTINUED | OUTPATIENT
Start: 2018-11-25 | End: 2018-11-28 | Stop reason: HOSPADM

## 2018-11-25 RX ORDER — ASPIRIN 325 MG
325 TABLET ORAL DAILY
Status: DISCONTINUED | OUTPATIENT
Start: 2018-11-26 | End: 2018-11-28 | Stop reason: HOSPADM

## 2018-11-25 RX ORDER — PANTOPRAZOLE SODIUM 40 MG/1
40 TABLET, DELAYED RELEASE ORAL
Status: DISCONTINUED | OUTPATIENT
Start: 2018-11-26 | End: 2018-11-28 | Stop reason: HOSPADM

## 2018-11-25 RX ORDER — SENNOSIDES 8.6 MG
1 TABLET ORAL DAILY
Status: DISCONTINUED | OUTPATIENT
Start: 2018-11-26 | End: 2018-11-26

## 2018-11-25 RX ORDER — SPIRONOLACTONE 25 MG/1
25 TABLET ORAL DAILY
Status: DISCONTINUED | OUTPATIENT
Start: 2018-11-26 | End: 2018-11-26

## 2018-11-25 RX ADMIN — HEPARIN SODIUM 5000 UNITS: 5000 INJECTION INTRAVENOUS; SUBCUTANEOUS at 22:27

## 2018-11-25 RX ADMIN — PRAVASTATIN SODIUM 40 MG: 40 TABLET ORAL at 22:27

## 2018-11-25 RX ADMIN — ONDANSETRON 4 MG: 2 INJECTION, SOLUTION INTRAMUSCULAR; INTRAVENOUS at 17:46

## 2018-11-25 RX ADMIN — SODIUM CHLORIDE 100 ML/HR: 0.9 INJECTION, SOLUTION INTRAVENOUS at 20:13

## 2018-11-25 RX ADMIN — IODIXANOL 100 ML: 320 INJECTION, SOLUTION INTRAVASCULAR at 19:07

## 2018-11-25 RX ADMIN — LEVOFLOXACIN 750 MG: 5 INJECTION, SOLUTION INTRAVENOUS at 22:27

## 2018-11-25 RX ADMIN — SODIUM CHLORIDE 1000 ML: 0.9 INJECTION, SOLUTION INTRAVENOUS at 17:43

## 2018-11-25 NOTE — ED PROVIDER NOTES
History  Chief Complaint   Patient presents with    Vomiting     patient presents to the ER stating he was diagnosed with pneumonia this week and put on another antibiotic, started with vomiting and diarrhea this morning, states is dehydrated   Diarrhea     Calvin Mccarthy is a 71year old male presenting to the ED with diarrhea and vomiting that began 15 hours ago  He also states that he is experiencing periumbilical abdominal pain, nausea, cough, congestion, and SOB  He denies blood in his vomit or stool, headaches, dizziness, lightheadedness, fevers, chills, or chest pain  He was diagnosed with pneumonia approximately 1 week ago and was started on doxycyline  He began experiencing a rash and was switched to a different antibiotic  He states this medication was not working and he was switched to levofloxacin by his PCP  He started taking it yesterday, but was unable to take it today because of the vomiting  He is unable to eat or drink anything because of the nausea  He has tried drinking ginger ale, but threw it up and he also tried pepto bismol without relief  He denies food contamination or recent foreign travel  He states he was at a party yesterday and ate a small amount of the food, but states others who also ate the food are not sick now  History of cancer, last chemotherapy treatment 1 year ago  Radiation treatment ended in July 2018  Influenza swab 10/12/18  Negative  History provided by:  Patient and spouse   used: No        Prior to Admission Medications   Prescriptions Last Dose Informant Patient Reported? Taking?    Multiple Vitamins-Minerals (ICAPS PO)  Self Yes No   Sig: Take 1 capsule by mouth daily   aspirin (HM ASPIRIN) 325 mg tablet  Self Yes No   Sig: Take 1 tablet by mouth daily   irbesartan (AVAPRO) 300 mg tablet  Self Yes No   Sig: Take 1 tablet by mouth daily   levofloxacin (LEVAQUIN) 750 mg tablet   No No   Sig: Take 1 tablet (750 mg total) by mouth every 24 hours for 5 days   metFORMIN (GLUCOPHAGE-XR) 750 mg 24 hr tablet   No No   Sig: Take 1 tablet (750 mg total) by mouth daily with dinner for 30 days   metoprolol succinate (TOPROL-XL) 100 mg 24 hr tablet  Self Yes No   omeprazole (PRILOSEC) 20 mg delayed release capsule  Self Yes No   Sig: Take 1 capsule by mouth Daily   pravastatin (PRAVACHOL) 40 mg tablet   No No   Sig: take 1 tablet by mouth once daily at bedtime   spironolactone (ALDACTONE) 25 mg tablet  Self Yes No   Sig: Take 1 tablet by mouth daily      Facility-Administered Medications: None       Past Medical History:   Diagnosis Date    Atrial fibrillation (HCC)     Hx of CABG     Nicotine dependence     Pneumonia        Past Surgical History:   Procedure Laterality Date    AORTIC VALVE REPLACEMENT      COLECTOMY      COLONOSCOPY      DENTAL SURGERY      KNEE SURGERY      LYMPHADENECTOMY      OTHER SURGICAL HISTORY      MAZE PROCEDURE       Family History   Problem Relation Age of Onset    Heart block Family      I have reviewed and agree with the history as documented  Social History   Substance Use Topics    Smoking status: Former Smoker    Smokeless tobacco: Never Used    Alcohol use Yes        Review of Systems   Constitutional: Positive for appetite change  Negative for chills and fever  HENT: Positive for congestion, rhinorrhea and sore throat  Eyes: Negative  Respiratory: Positive for cough and shortness of breath  Cardiovascular: Negative for chest pain, palpitations and leg swelling  Gastrointestinal: Positive for abdominal pain, diarrhea, nausea and vomiting  Negative for abdominal distention, blood in stool and constipation  Endocrine: Negative  Genitourinary: Negative  Musculoskeletal: Positive for neck stiffness  Skin: Negative  Negative for rash  Allergic/Immunologic: Negative  Neurological: Negative for dizziness, weakness, light-headedness, numbness and headaches  Hematological: Negative  Psychiatric/Behavioral: Negative  Physical Exam  Physical Exam   Constitutional: He is oriented to person, place, and time  He appears well-developed and well-nourished  No distress  HENT:   Head: Normocephalic and atraumatic  Eyes: Pupils are equal, round, and reactive to light  Conjunctivae and EOM are normal    Neck: Normal range of motion  Cardiovascular: Normal rate, regular rhythm, normal heart sounds and intact distal pulses  Exam reveals no gallop and no friction rub  No murmur heard  Pulmonary/Chest: Effort normal  No respiratory distress  + crackles in the RLL      Abdominal: Soft  Bowel sounds are normal  He exhibits no distension and no mass  There is no hepatosplenomegaly  There is tenderness in the periumbilical area  There is no guarding  Neurological: He is alert and oriented to person, place, and time  Skin: Skin is warm and dry  Capillary refill takes less than 2 seconds  No rash noted  He is not diaphoretic  No pallor  Psychiatric: He has a normal mood and affect  His behavior is normal  Judgment and thought content normal    Nursing note and vitals reviewed        Vital Signs  ED Triage Vitals [11/25/18 1728]   Temperature Pulse Respirations Blood Pressure SpO2   97 5 °F (36 4 °C) (!) 109 20 113/58 96 %      Temp Source Heart Rate Source Patient Position - Orthostatic VS BP Location FiO2 (%)   Temporal Monitor Lying Right arm --      Pain Score       No Pain           Vitals:    11/25/18 1728 11/25/18 1929 11/25/18 1930   BP: 113/58 120/56 120/56   Pulse: (!) 109 95 96   Patient Position - Orthostatic VS: Lying Lying Lying       Visual Acuity      ED Medications  Medications   sodium chloride 0 9 % infusion (100 mL/hr Intravenous New Bag 11/25/18 2013)   ondansetron (ZOFRAN) injection 4 mg (4 mg Intravenous Given 11/25/18 1746)   sodium chloride 0 9 % bolus 1,000 mL (0 mL Intravenous Stopped 11/25/18 2013)   iodixanol (VISIPAQUE) 320 MG/ML injection 100 mL (100 mL Intravenous Given 11/25/18 1907)       Diagnostic Studies  Results Reviewed     Procedure Component Value Units Date/Time    Lactic acid, plasma [[id number]]  (Normal) Collected:  11/25/18 1903    Lab Status:  Final result Specimen:  Blood from Arm, Right Updated:  11/25/18 2018     LACTIC ACID 1 4 mmol/L     Narrative:         Result may be elevated if tourniquet was used during collection  POCT urinalysis dipstick [[id number]]  (Normal) Resulted:  11/25/18 1929    Lab Status:  Final result Specimen:  Urine Updated:  11/25/18 1929     Color, UA yellow     Clarity, UA clear     Glucose, UA (Ref: Negative) 100     Bilirubin, UA (Ref: Negative) -     Ketones, UA (Ref: Negative) -     Spec Grav, UA (Ref:1 003-1 030) 1 015     Blood, UA (Ref: Negative) -     pH, UA (Ref: 4 5-8 0) 6 0     Protein, UA (Ref: Negative) trace     Urobilinogen, UA (Ref: 0 2- 1 0) 0 2      Leukocytes, UA (Ref: Negative) -     Nitrite, UA (Ref: Negative) -    Blood culture #2 [[id number]] Collected:  11/25/18 1904    Lab Status: In process Specimen:  Blood from Arm, Left Updated:  11/25/18 1918    Blood culture #1 [[id number]] Collected:  11/25/18 1904    Lab Status: In process Specimen:  Blood from Arm, Left Updated:  11/25/18 1918    Magnesium [[id number]]  (Normal) Collected:  11/25/18 1744    Lab Status:  Final result Specimen:  Blood from Arm, Right Updated:  11/25/18 1832     Magnesium 1 8 mg/dL     Clostridium difficile toxin by PCR [[id number]]     Lab Status:  No result Specimen:  Stool from Per Rectum     CBC and differential [[id number]]  (Abnormal) Collected:  11/25/18 1744    Lab Status:  Final result Specimen:  Blood from Arm, Right Updated:  11/25/18 1818     WBC 29 23 (H) Thousand/uL      RBC 4 88 Million/uL      Hemoglobin 13 9 g/dL      Hematocrit 42 4 %      MCV 87 fL      MCH 28 5 pg      MCHC 32 8 g/dL      RDW 13 7 %      MPV 9 5 fL      Platelets 764 (H) Thousands/uL      nRBC 0 /100 WBCs     Narrative:        This is an appended report  These results have been appended to a previously verified report  Comprehensive metabolic panel [[id number]]  (Abnormal) Collected:  11/25/18 1744    Lab Status:  Final result Specimen:  Blood from Arm, Right Updated:  11/25/18 1811     Sodium 131 (L) mmol/L      Potassium 4 6 mmol/L      Chloride 93 (L) mmol/L      CO2 22 mmol/L      ANION GAP 16 (H) mmol/L      BUN 40 (H) mg/dL      Creatinine 1 64 (H) mg/dL      Glucose 306 (H) mg/dL      Calcium 9 3 mg/dL      AST 20 U/L      ALT 24 U/L      Alkaline Phosphatase 90 U/L      Total Protein 7 6 g/dL      Albumin 3 3 (L) g/dL      Total Bilirubin 0 50 mg/dL      eGFR 41 ml/min/1 73sq m     Narrative:         National Kidney Disease Education Program recommendations are as follows:  GFR calculation is accurate only with a steady state creatinine  Chronic Kidney disease less than 60 ml/min/1 73 sq  meters  Kidney failure less than 15 ml/min/1 73 sq  meters  Lipase [[id number]]  (Normal) Collected:  11/25/18 1744    Lab Status:  Final result Specimen:  Blood from Arm, Right Updated:  11/25/18 1811     Lipase 173 u/L                  CT chest abdomen pelvis w contrast   Final Result by Bethany Magana DO (11/25 1930)      Airspace opacities throughout the lung bases and radionuclide airspace opacities within the left upper lobe which may represent infectious etiology follow-up to resolution is recommended  Mild thickening of the sigmoid colon which may represent colitis  Follow-up with gastroenterology is recommended  The study was marked in [facility] for immediate notification        Workstation performed: [id number]                    Procedures  Procedures       Phone Contacts  ED Phone Contact    ED Course  ED Course as of Nov 25 2041   Davey Garcia Nov 25, 2018   1935 IMPRESSION:     Airspace opacities throughout the lung bases and radionuclide airspace opacities within the left upper lobe which may represent infectious etiology follow-up to resolution is recommended      Mild thickening of the sigmoid colon which may represent colitis  Follow-up with gastroenterology is recommended      The study was marked in [employer] for immediate notification  1937 WBC 29 23  CT reveals colitis with pneumonia  Pt is currently on levaquin for pneumonia  2001 Spoke with Pilar Diallo [facility] regarding pt status and admission  Pt agrees to [address]  [facility]  Number of Diagnoses or Management Options  Diagnosis management comments: Differential diagnosis:   Gastroenteritis  C   Diff infection secondary to recent antibiotic use  Appendicitis   Colitis    Food poisoning   Unresolved pneumonia  Reaction to levofloxacin      Plan:   CT chest/abd/pelvis, IV fluids, zofran, EKG, labs, stool studies     CritCare Time    Disposition  Final diagnoses:   Colitis   Pneumonia   Nausea and vomiting, intractability of vomiting not specified, unspecified vomiting type   Diarrhea, unspecified type   Leukocytosis, unspecified type   EROS (acute kidney injury) ([facility] )   Essential hypertension   Type 2 diabetes mellitus without complication, without long-term current use of insulin ([facility] )     Time reflects when diagnosis was documented in both MDM as applicable and the Disposition within this note     Time User Action Codes Description Comment    11/25/2018  7:47 PM Jah Backers Add [K52 9] Colitis     11/25/2018  7:48 PM Rollen Lis [J18 9] Pneumonia     11/25/2018  7:48 PM Jah Backers Add [R11 2] Nausea and vomiting, intractability of vomiting not specified, unspecified vomiting type     11/25/2018  7:48 PM Jah Backers Add [R19 7] Diarrhea, unspecified type     11/25/2018  7:48 PM Jah Backers Add [D72 829] Leukocytosis, unspecified type     11/25/2018  7:49 PM Jah Backers Add [N17 9] EROS (acute kidney injury) ([facility] )     11/25/2018  7:49 PM Jah Backers Add [I10] Essential hypertension     11/25/2018  7:49 PM Jah Backers Add [E11 9] Type 2 diabetes mellitus without complication, without long-term current use of insulin [facility])       ED Disposition     ED Disposition Condition Comment    Admit  Case was discussed with WINTER and the patient's admission status was agreed to be Admission Status: inpatient status to the service of Dr Duncan Hart   Follow-up Information    None         Patient's Medications   Discharge Prescriptions    No medications on file     No discharge procedures on file      ED Provider  Electronically Signed by           Jordan Horton  11/25/18 2044

## 2018-11-26 LAB
ANION GAP SERPL CALCULATED.3IONS-SCNC: 10 MMOL/L (ref 4–13)
BASOPHILS # BLD MANUAL: 0 THOUSAND/UL (ref 0–0.1)
BASOPHILS NFR MAR MANUAL: 0 % (ref 0–1)
BUN SERPL-MCNC: 38 MG/DL (ref 5–25)
CALCIUM SERPL-MCNC: 8.3 MG/DL (ref 8.3–10.1)
CHLORIDE SERPL-SCNC: 99 MMOL/L (ref 100–108)
CO2 SERPL-SCNC: 26 MMOL/L (ref 21–32)
CREAT SERPL-MCNC: 1.44 MG/DL (ref 0.6–1.3)
EOSINOPHIL # BLD MANUAL: 0.88 THOUSAND/UL (ref 0–0.4)
EOSINOPHIL NFR BLD MANUAL: 5 % (ref 0–6)
ERYTHROCYTE [DISTWIDTH] IN BLOOD BY AUTOMATED COUNT: 13.7 % (ref 11.6–15.1)
EST. AVERAGE GLUCOSE BLD GHB EST-MCNC: 186 MG/DL
GFR SERPL CREATININE-BSD FRML MDRD: 49 ML/MIN/1.73SQ M
GLUCOSE SERPL-MCNC: 114 MG/DL (ref 65–140)
GLUCOSE SERPL-MCNC: 126 MG/DL (ref 65–140)
GLUCOSE SERPL-MCNC: 152 MG/DL (ref 65–140)
GLUCOSE SERPL-MCNC: 158 MG/DL (ref 65–140)
GLUCOSE SERPL-MCNC: 164 MG/DL (ref 65–140)
HBA1C MFR BLD: 8.1 % (ref 4.2–6.3)
HCT VFR BLD AUTO: 38.1 % (ref 36.5–49.3)
HGB BLD-MCNC: 12.4 G/DL (ref 12–17)
L PNEUMO1 AG UR QL IA.RAPID: NEGATIVE
LYMPHOCYTES # BLD AUTO: 1.41 THOUSAND/UL (ref 0.6–4.47)
LYMPHOCYTES # BLD AUTO: 8 % (ref 14–44)
MAGNESIUM SERPL-MCNC: 2.1 MG/DL (ref 1.6–2.6)
MCH RBC QN AUTO: 28.8 PG (ref 26.8–34.3)
MCHC RBC AUTO-ENTMCNC: 32.5 G/DL (ref 31.4–37.4)
MCV RBC AUTO: 88 FL (ref 82–98)
MONOCYTES # BLD AUTO: 1.59 THOUSAND/UL (ref 0–1.22)
MONOCYTES NFR BLD: 9 % (ref 4–12)
NEUTROPHILS # BLD MANUAL: 13.75 THOUSAND/UL (ref 1.85–7.62)
NEUTS BAND NFR BLD MANUAL: 1 % (ref 0–8)
NEUTS SEG NFR BLD AUTO: 77 % (ref 43–75)
NRBC BLD AUTO-RTO: 0 /100 WBCS
PLATELET # BLD AUTO: 351 THOUSANDS/UL (ref 149–390)
PLATELET # BLD AUTO: 381 THOUSANDS/UL (ref 149–390)
PLATELET BLD QL SMEAR: ADEQUATE
PMV BLD AUTO: 9.5 FL (ref 8.9–12.7)
PMV BLD AUTO: 9.7 FL (ref 8.9–12.7)
POTASSIUM SERPL-SCNC: 4.3 MMOL/L (ref 3.5–5.3)
PROCALCITONIN SERPL-MCNC: 0.1 NG/ML
RBC # BLD AUTO: 4.31 MILLION/UL (ref 3.88–5.62)
RBC MORPH BLD: NORMAL
S PNEUM AG UR QL: NEGATIVE
SODIUM SERPL-SCNC: 135 MMOL/L (ref 136–145)
TOTAL CELLS COUNTED SPEC: 100
TOXIC GRANULES BLD QL SMEAR: PRESENT
WBC # BLD AUTO: 17.63 THOUSAND/UL (ref 4.31–10.16)

## 2018-11-26 PROCEDURE — 84145 PROCALCITONIN (PCT): CPT | Performed by: PHYSICIAN ASSISTANT

## 2018-11-26 PROCEDURE — 94640 AIRWAY INHALATION TREATMENT: CPT

## 2018-11-26 PROCEDURE — 99223 1ST HOSP IP/OBS HIGH 75: CPT | Performed by: INTERNAL MEDICINE

## 2018-11-26 PROCEDURE — 99232 SBSQ HOSP IP/OBS MODERATE 35: CPT | Performed by: INTERNAL MEDICINE

## 2018-11-26 PROCEDURE — 94664 DEMO&/EVAL PT USE INHALER: CPT

## 2018-11-26 PROCEDURE — 87449 NOS EACH ORGANISM AG IA: CPT | Performed by: PHYSICIAN ASSISTANT

## 2018-11-26 PROCEDURE — 85027 COMPLETE CBC AUTOMATED: CPT | Performed by: PHYSICIAN ASSISTANT

## 2018-11-26 PROCEDURE — 87493 C DIFF AMPLIFIED PROBE: CPT | Performed by: INTERNAL MEDICINE

## 2018-11-26 PROCEDURE — 83735 ASSAY OF MAGNESIUM: CPT | Performed by: PHYSICIAN ASSISTANT

## 2018-11-26 PROCEDURE — 82948 REAGENT STRIP/BLOOD GLUCOSE: CPT

## 2018-11-26 PROCEDURE — 83036 HEMOGLOBIN GLYCOSYLATED A1C: CPT | Performed by: PHYSICIAN ASSISTANT

## 2018-11-26 PROCEDURE — 80048 BASIC METABOLIC PNL TOTAL CA: CPT | Performed by: PHYSICIAN ASSISTANT

## 2018-11-26 PROCEDURE — 85007 BL SMEAR W/DIFF WBC COUNT: CPT | Performed by: PHYSICIAN ASSISTANT

## 2018-11-26 PROCEDURE — 85049 AUTOMATED PLATELET COUNT: CPT | Performed by: PHYSICIAN ASSISTANT

## 2018-11-26 RX ORDER — LEVOFLOXACIN 5 MG/ML
750 INJECTION, SOLUTION INTRAVENOUS EVERY 24 HOURS
Status: DISCONTINUED | OUTPATIENT
Start: 2018-11-26 | End: 2018-11-26

## 2018-11-26 RX ORDER — LEVOFLOXACIN 5 MG/ML
750 INJECTION, SOLUTION INTRAVENOUS EVERY 24 HOURS
Status: COMPLETED | OUTPATIENT
Start: 2018-11-26 | End: 2018-11-27

## 2018-11-26 RX ADMIN — Medication 125 MG: at 08:11

## 2018-11-26 RX ADMIN — Medication 125 MG: at 19:31

## 2018-11-26 RX ADMIN — INSULIN LISPRO 1 UNITS: 100 INJECTION, SOLUTION INTRAVENOUS; SUBCUTANEOUS at 13:31

## 2018-11-26 RX ADMIN — HEPARIN SODIUM 5000 UNITS: 5000 INJECTION INTRAVENOUS; SUBCUTANEOUS at 05:35

## 2018-11-26 RX ADMIN — METRONIDAZOLE 500 MG: 500 INJECTION, SOLUTION INTRAVENOUS at 15:40

## 2018-11-26 RX ADMIN — Medication 125 MG: at 13:30

## 2018-11-26 RX ADMIN — METRONIDAZOLE 500 MG: 500 INJECTION, SOLUTION INTRAVENOUS at 08:11

## 2018-11-26 RX ADMIN — HEPARIN SODIUM 5000 UNITS: 5000 INJECTION INTRAVENOUS; SUBCUTANEOUS at 13:30

## 2018-11-26 RX ADMIN — ASPIRIN 325 MG ORAL TABLET 325 MG: 325 PILL ORAL at 08:11

## 2018-11-26 RX ADMIN — LEVOFLOXACIN 750 MG: 5 INJECTION, SOLUTION INTRAVENOUS at 22:04

## 2018-11-26 RX ADMIN — METRONIDAZOLE 500 MG: 500 INJECTION, SOLUTION INTRAVENOUS at 22:22

## 2018-11-26 RX ADMIN — LOSARTAN POTASSIUM 100 MG: 50 TABLET ORAL at 08:11

## 2018-11-26 RX ADMIN — PRAVASTATIN SODIUM 40 MG: 40 TABLET ORAL at 22:04

## 2018-11-26 RX ADMIN — PANTOPRAZOLE SODIUM 40 MG: 40 TABLET, DELAYED RELEASE ORAL at 05:26

## 2018-11-26 RX ADMIN — METOPROLOL SUCCINATE 100 MG: 50 TABLET, EXTENDED RELEASE ORAL at 08:11

## 2018-11-26 RX ADMIN — SODIUM CHLORIDE 100 ML/HR: 0.9 INJECTION, SOLUTION INTRAVENOUS at 19:32

## 2018-11-26 RX ADMIN — HEPARIN SODIUM 5000 UNITS: 5000 INJECTION INTRAVENOUS; SUBCUTANEOUS at 22:04

## 2018-11-26 NOTE — PROGRESS NOTES
Progress Note - Roseanne Heath 71 y o  male MRN: [id number]  Unit/Bed#: [address] [address] [id number] Encounter: [id number]    Assessment:  Principal Problem:    Pneumonia  Active Problems:    Type 2 diabetes mellitus without complication, without long-term current use of insulin (HCC)    EROS (acute kidney injury) ([facility] )    Colitis  Resolved Problems:    * No resolved hospital problems  *      Plan:  · Recurrent pneumonia? Aspiration  Pulmonary consult appreciated  As per pulmonologist will complete a course of Levaquin tonight as patient was taking outpatient antibiotic courses  Procalcitonin is normal   Follow-up culture results  · Nausea/vomiting/colitis  Stool for C diff is pending  Continue on oral vancomycin  · Diabetes mellitus  Accu-Chek a c  HS with Humalog sliding scale  Continue to hold oral medications  · Hypertension-on Cozaar and Toprol-XL  · DVT prophylaxis  · Discussed with patient in detail  Subjective:   Patient is seen and examined at bedside  No further episode of diarrhea, nausea and vomiting since last night  Has some cough  Denies any shortness of breath or chest pain  All other ROS are negative  Objective:   Vitals: Blood pressure 115/59, pulse 100, temperature 98 2 °F (36 8 °C), temperature source Oral, resp  rate 18, height 5' 11" (1 803 m), weight 77 kg (169 lb 12 1 oz), SpO2 96 %  ,Body mass index is 23 68 kg/m²  SPO2 RA Rest      ED to Hosp-Admission (Current) from 11/25/2018 in [facility] Surg Unit   SpO2  96 %   SpO2 Activity  At Rest   O2 Device  None (Room air)   O2 Flow Rate  --        I&O:   Intake/Output Summary (Last 24 hours) at 11/26/18 0746  Last data filed at 11/26/18 0501   Gross per 24 hour   Intake             1000 ml   Output              550 ml   Net              450 ml       Physical Exam:    General- Alert, lying comfortably in bed  Not in any acute distress  HEENT- JUAN, EOM intact    Neck- Supple, No JVD  CVS- regular, S1 and S2 normal   Chest- Bilateral Air entry, No rhochi, crackles or wheezing present  Abdomen- soft, nontender, not distended, no guarding or rigidity, BS+  Extremities-  No pedal edema, No calf tenderness                         Normal ROM in all extremities  CNS-   Alert, awake and orientedx3  No focal deficits present      Invasive Devices     Peripheral Intravenous Line            Peripheral IV 11/25/18 Left Antecubital less than 1 day    Peripheral IV 11/25/18 Right Antecubital less than 1 day                      Social History  reviewed  Family History   Problem Relation Age of Onset    Heart block Family     reviewed    Meds:  Current Facility-Administered Medications   Medication Dose Route Frequency Provider Last Rate Last Dose    acetaminophen (TYLENOL) tablet 650 mg  650 mg Oral Q6H PRN Josetta Boeck, PA-C        aspirin tablet 325 mg  325 mg Oral Daily Josetta Boeck, PA-C        calcium carbonate (TUMS) chewable tablet 1,000 mg  1,000 mg Oral Daily PRN Josetta Boeck, PA-C        heparin (porcine) subcutaneous injection 5,000 Units  5,000 Units Subcutaneous Q8H [address] Josetta Boeck, PA-C   5,000 Units at 11/26/18 0535    insulin lispro (HumaLOG) 100 units/mL subcutaneous injection 1-6 Units  1-6 Units Subcutaneous Q6H [address] Josetta Boeck, PA-C        levofloxacin (LEVAQUIN) IVPB (premix) 750 mg  750 mg Intravenous Q48H Josetta Boeck, PA-C 100 mL/hr at 11/25/18 2227 750 mg at 11/25/18 2227    losartan (COZAAR) tablet 100 mg  100 mg Oral Daily Josetta Boeck, PA-C        metoprolol succinate (TOPROL-XL) 24 hr tablet 100 mg  100 mg Oral Daily Josetta Boeck, PA-C        metroNIDAZOLE (FLAGYL) IVPB (premix) 500 mg  500 mg Intravenous Q8H Loren Hardin MD        ondansetron (ZOFRAN) injection 4 mg  4 mg Intravenous Q6H PRN Josetta Boeck, PA-C        pantoprazole (PROTONIX) EC tablet 40 mg  40 mg Oral Early Morning Josetta Boeck, PA-C   40 mg at 11/26/18 0526    pravastatin (PRAVACHOL) tablet 40 mg  40 mg Oral HS Caprice Purchase, PA-C   40 mg at 11/25/18 2227    senna (SENOKOT) tablet 8 6 mg  1 tablet Oral Daily Caprice Purchase, PA-C        sodium chloride 0 9 % infusion  100 mL/hr Intravenous Continuous Katrina Grooms, CRNP 100 mL/hr at 11/25/18 2013 100 mL/hr at 11/25/18 2013    vancomycin [facility]) oral solution 125 mg  125 mg Oral Q6H [address] Caprice Purchase, PA-C          Prescriptions Prior to Admission   Medication    aspirin (HM ASPIRIN) 325 mg tablet    irbesartan (AVAPRO) 300 mg tablet    levofloxacin (LEVAQUIN) 750 mg tablet    metFORMIN (GLUCOPHAGE-XR) 750 mg 24 hr tablet    metoprolol succinate (TOPROL-XL) 100 mg 24 hr tablet    Multiple Vitamins-Minerals (ICAPS PO)    omeprazole (PRILOSEC) 20 mg delayed release capsule    pravastatin (PRAVACHOL) 40 mg tablet    spironolactone (ALDACTONE) 25 mg tablet       Labs:    Results from last 7 days  Lab Units 11/26/18  0449 11/26/18  0008 11/25/18  1744   WBC Thousand/uL 17 63*  --  29 23*   HEMOGLOBIN g/dL 12 4  --  13 9   HEMATOCRIT % 38 1  --  42 4   PLATELETS Thousands/uL 351 381 513*   LYMPHO PCT % 8*  --  9*   MONO PCT % 9  --  4   EOS PCT % 5  --  0       Results from last 7 days  Lab Units 11/26/18  0449 11/25/18  1744   POTASSIUM mmol/L 4 3 4 6   CHLORIDE mmol/L 99* 93*   CO2 mmol/L 26 22   BUN mg/dL 38* 40*   CREATININE mg/dL 1 44* 1 64*   CALCIUM mg/dL 8 3 9 3   ALK PHOS U/L  --  90   ALT U/L  --  24   AST U/L  --  20     No results found for: TROPONINI, CKMB, CKTOTAL      No results found for: Olive Race, SPUTUMCULTUR      Imaging:  No results found for this or any previous visit  Results for orders placed in visit on 11/19/18   XR chest pa & lateral       Labs & Imaging: I have personally reviewed pertinent reports        VTE Pharmacologic Prophylaxis: Heparin  VTE Mechanical Prophylaxis: sequential compression device    Code Status:   Level 1 - Full Code      "This note has been constructed using a voice recognition system"      Jacek Gonzalez MD  11/26/2018,7:34 AM

## 2018-11-26 NOTE — ASSESSMENT & PLAN NOTE
· Levaquin 750 mg IV q48h, renally dosed, day 3  · Sputum culture and gram stain  · Strep pneumonia urine, legionella urine antigen  · Procalcitonin

## 2018-11-26 NOTE — UTILIZATION REVIEW
Initial Clinical Review    Admission: Date/Time/Statement: 11/25/18 @ 1958 Inpatient Written     Orders Placed This Encounter   Procedures    Inpatient Admission (expected length of stay for this patient is greater than two midnights)     Standing Status:   Standing     Number of Occurrences:   1     Order Specific Question:   Admitting Physician     Answer:   Tonny Dee [[id number]]     Order Specific Question:   Level of Care     Answer:   Med Surg [[id number]]     Order Specific Question:   Estimated length of stay     Answer:   More than 2 Midnights     Order Specific Question:   Certification     Answer:   I certify that inpatient services are medically necessary for this patient for a duration of greater than two midnights  See H&P and MD Progress Notes for additional information about the patient's course of treatment  ED: Date/Time/Mode of Arrival:   ED Arrival Information     Expected Arrival Acuity Means of Arrival Escorted By Service Admission Type    - 11/25/2018 16:42 Urgent Walk-In Family Member General Medicine Urgent    Arrival Complaint    vomiting/diarrhea/pnuemonia          Chief Complaint:   Chief Complaint   Patient presents with    Vomiting     patient presents to the ER stating he was diagnosed with pneumonia this week and put on another antibiotic, started with vomiting and diarrhea this morning, states is dehydrated   Diarrhea       History of Illness: Jacki Jean Baptiste is a 71year old male presenting to the ED with diarrhea and vomiting that began 15 hours ago  He also states that he is experiencing periumbilical abdominal pain, nausea, cough, congestion, and SOB  He denies blood in his vomit or stool, headaches, dizziness, lightheadedness, fevers, chills, or chest pain  He was diagnosed with pneumonia approximately 1 week ago and was started on doxycyline  He began experiencing a rash and was switched to a different antibiotic   He states this medication was not working and he was switched to levofloxacin by his PCP  He started taking it yesterday, but was unable to take it today because of the vomiting  He is unable to eat or drink anything because of the nausea  He has tried drinking ginger ale, but threw it up and he also tried pepto bismol without relief  He denies food contamination or recent foreign travel  He states he was at a party yesterday and ate a small amount of the food, but states others who also ate the food are not sick now  ED Vital Signs:   ED Triage Vitals [11/25/18 1728]   Temperature Pulse Respirations Blood Pressure SpO2   97 5 °F (36 4 °C) (!) 109 20 113/58 96 %      Temp Source Heart Rate Source Patient Position - Orthostatic VS BP Location FiO2 (%)   Temporal Monitor Lying Right arm --      Pain Score       No Pain        Wt Readings from Last 1 Encounters:   11/26/18 77 8 kg (171 lb 8 3 oz)       Vital Signs (abnormal): -109    Abnormal Labs/Diagnostic Test Results:   WBC 29 23*   PLATELETS 729*   LYMPHO PCT 9*     SODIUM 131*   CHLORIDE 93*   BUN 40*   CREATININE 1 64*   ANION GAP 16*   ALBUMIN 3 3*   GLUCOSE RANDOM 306*      CT chest abdomen pelvis w contrast   Airspace opacities throughout the lung bases and radionuclide airspace opacities within the left upper lobe which may represent infectious etiology follow-up to resolution is recommended        Mild thickening of the sigmoid colon which may represent colitis  Follow-up with gastroenterology is recommended       EKG: Sinus Tachycardia    ED Treatment:   Medication Administration from 11/25/2018 1642 to 11/25/2018 2100       Date/Time Order Dose Route Action     11/25/2018 1746 ondansetron (ZOFRAN) injection 4 mg 4 mg Intravenous Given     11/25/2018 1743 sodium chloride 0 9 % bolus 1,000 mL 1,000 mL Intravenous New Bag     11/25/2018 2013 sodium chloride 0 9 % infusion 100 mL/hr Intravenous New Bag     11/25/2018 1907 iodixanol (VISIPAQUE) 320 MG/ML injection 100 mL 100 mL Intravenous Given          Past Medical/Surgical History: Active Ambulatory Problems     Diagnosis Date Noted    Basal cell carcinoma of skin 01/23/2018    Coronary disease 10/07/2014    Type 2 diabetes mellitus without complication, without long-term current use of insulin ([place] [address] ) 06/21/2012    Dyslipidemia 05/17/2012    Elevation of level of transaminase or lactic acid dehydrogenase (LDH) 09/14/2012    Erectile dysfunction 11/03/2017    Esophageal reflux 09/14/2012    Fatty liver 06/21/2012    Mal[facility] lymphoma ([facility] ) 06/21/2012    Malignant tumor of cecum ([place] [address] ) 09/26/2013    Primary osteoarthritis of both knees 06/19/2015    Multiple myeloma not having achieved remission ([place] [address] ) 05/25/2018    Pneumonia 05/25/2018    Primary localized osteoarthritis of right knee 08/17/2018    Primary localized osteoarthritis of left knee 08/17/2018    Atrial fibrillation ([place] [address] )     Hx of CABG     Essential hypertension 11/25/2018     Resolved Ambulatory Problems     Diagnosis Date Noted    Aortic stenosis 06/21/2012    Nicotine dependence 05/25/2018    Atrial fibrillation ([place] [address] ) 09/04/2018     Past Medical History:   Diagnosis Date    Atrial fibrillation (HCC)     CAD (coronary artery disease)     Colon cancer (HCC)     Diabetes mellitus ([place] [address] )     Fatty liver     GERD (gastroesophageal reflux disease)     Hyperlipidemia     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[address] )     Osteoarthritis of both knees     Pneumonia        Admitting Diagnosis: Colitis [K52 9]  Vomiting [R11 10]  Pneumonia [J18 9]  Essential hypertension [I10]  EROS (acute kidney injury) ([place] [address] ) [N17 9]  Leukocytosis, unspecified type [D72 829]  Type 2 diabetes mellitus without complication, without long-term current use of insulin (HCC) [E11 9]  Diarrhea, unspecified type [R19 7]  Nausea and vomiting, intractability of vomiting not specified, unspecified vomiting type [R11 2]    Age/Sex: 71 y o  male    Assessment/Plan: Pt to ED presents with diarrhea and vomiting that began earlier today  Patient also complains of abdominal pain, nausea  Patient was diagnosed with pneumonia approximately 1 week ago and started on doxycycline  Patient experienced a rash and was switched to secondary antibiotic cefuroxime  Patient continued to suffer from fever, chills, shortness of breath and was placed on Levaquin by his PCP  Patient could not keep this medication down due to constant vomiting  Admit INPATIENT STATUS; IV ABX, IVF, labs  VTE Prophylaxis: Heparin  / sequential compression device   Anticipated Length of Stay:  Patient will be admitted on an Inpatient basis with an anticipated length of stay of  Greater than 2 midnights  Justification for Hospital Stay: Pnuemonia with failed outpatient therapy, colitis    Admission Orders:  NPO  Aspiration precautions  OOB as daniel  Accuchecks QAC and QHS with coverage  Incentive spirometry  Blood and stool cxs  Pt  Consult GI, pulmonlogy    Scheduled Meds:   Current Facility-Administered Medications:  acetaminophen 650 mg Oral Q6H PRN   aspirin 325 mg Oral Daily   calcium carbonate 1,000 mg Oral Daily PRN   heparin (porcine) 5,000 Units Subcutaneous Q8H [address]   insulin lispro 1-6 Units Subcutaneous Q6H [address]   levofloxacin 750 mg Intravenous Q24H   losartan 100 mg Oral Daily   metoprolol succinate 100 mg Oral Daily   metroNIDAZOLE 500 mg Intravenous Q8H   ondansetron 4 mg Intravenous Q6H PRN   pantoprazole 40 mg Oral Early Morning   pravastatin 40 mg Oral HS   sodium chloride 100 mL/hr Intravenous Continuous   vancomycin 125 mg Oral Q6H [address]     Continuous Infusions:   sodium chloride 100 mL/hr Last Rate: 100 mL/hr (11/25/18 2013)     PRN Meds:   acetaminophen    calcium carbonate    ondansetron    [address] Review Department  Phone: [phone]; Fax [phone]  [email or web address]  ATTENTION: Please call with any questions or concerns to [phone]  and carefully listen to the prompts so that you are directed to the right person  Send all requests for admission clinical reviews, approved or denied determinations and any other requests to fax [phone]   All voicemails are confidential

## 2018-11-26 NOTE — CONSULTS
Mathew Ghosh  [id number]    71 y o   male      ASSESSMENT  1  Diarrhea with mild colitis noted on CT scan of the abdomen and pelvis at sigmoid colon  Increased risk for C diff with recent antibiotic exposure  2  Acute onset of nausea,  vomiting and diarrhea  Possibly secondary gastroenteritis  Exclude C diff  3  Pneumonia, he has been on 2 antibiotics recently for this  4  History of right colon carcinoma in 2013 status post resection  In 2014 repeat colonoscopy demonstrated sigmoid colon and rectum with MALT, representing a low grade lymphoma  Status post chemo and radiation      PLAN  1  Check stool for C diff  2  Eventual colonoscopy when pneumonia and diarrhea has improved     Chief Complaint   Patient presents with    Vomiting     patient presents to the ER stating he was diagnosed with pneumonia this week and put on another antibiotic, started with vomiting and diarrhea this morning, states is dehydrated   Diarrhea       HPI   52-year-old male the presents with an acute onset of nausea vomiting and diarrhea that began yesterday morning  Associated with some diffuse abdominal pain  He has been on 2 courses of antibiotics over the past few weeks for pneumonia  Denies any rectal bleeding, melena or hematemesis  He reports a lost 7-8 pounds over the past few weeks  He has a significant history of Hodgkin's lymphoma, non-Hodgkin's lymphoma, malt, colon cancer and plasmacytoma  Colonoscopy in 2013 showed right colon cancer status post resection  A subsequent colonoscopy  performed in 2014 that showed MALT in the sigmoid colon and rectum, representing a low grade lymphoma  Status post chemotherapy  He is overdue for surveillance colonoscopy, as that was his last colonoscopy      Past Medical History:   Diagnosis Date    Atrial fibrillation ([address] )     Hx of CABG     Nicotine dependence     Pneumonia        Past Surgical History:   Procedure Laterality Date    AORTIC VALVE REPLACEMENT      COLECTOMY      COLONOSCOPY      DENTAL SURGERY      KNEE SURGERY      LYMPHADENECTOMY      OTHER SURGICAL HISTORY      MAZE PROCEDURE       Prescriptions Prior to Admission   Medication    aspirin (HM ASPIRIN) 325 mg tablet    irbesartan (AVAPRO) 300 mg tablet    levofloxacin (LEVAQUIN) 750 mg tablet    metFORMIN (GLUCOPHAGE-XR) 750 mg 24 hr tablet    metoprolol succinate (TOPROL-XL) 100 mg 24 hr tablet    Multiple Vitamins-Minerals (ICAPS PO)    omeprazole (PRILOSEC) 20 mg delayed release capsule    pravastatin (PRAVACHOL) 40 mg tablet    spironolactone (ALDACTONE) 25 mg tablet       Allergies   Allergen Reactions    Ceftin [Cefuroxime] Itching       Social History   Substance Use Topics    Smoking status: Former Smoker    Smokeless tobacco: Never Used    Alcohol use Yes       Family History   Problem Relation Age of Onset    Heart block Family        Review of Systems  General ROS: negative for weight loss, fever, night sweats  Psychological ROS: negative for depression or anxiety  Ophthalmic ROS: negative for any eye issues  ENT ROS: no scleral icterus  Hematological and Lymphatic: no issues with bleeding or adenopathy  Respiratory ROS: no chest pain, shortness of breath or cough  Cardiovascular ROS: no issues with chest pain, heart palpitations  Gastrointestinal ROS:  +nausea, +vomiting, +diarrhea +abdominal pain  Genito-Urinary ROS: no issues with urinary burning, urinary frequency or hematuria  Neurological ROS:  no asterixis  Dermatological ROS: no skin rashes or lesions        /60 (BP Location: Left arm)   Pulse 70   Temp 98 2 °F (36 8 °C) (Oral)   Resp 18   Ht 5' 11" (1 803 m)   Wt 77 8 kg (171 lb 8 3 oz)   SpO2 94%   BMI 23 92 kg/m²       Physical Exam     Constitutional:  no acute distress, non-toxic appearance   Eyes:  conjunctiva normal   HENT:  Atraumatic  Respiratory:  Diminished breath sounds   Cardiovascular:  Normal rate  GI:  Soft, nondistended, normal bowel sounds, nontender   Musculoskeletal:  No edema  Neurologic:  Alert & oriented x 3  Psychiatric:  Speech and behavior appropriate               Lab Results   Component Value Date    GLUCOSE 204 (H) 10/13/2017    CALCIUM 8 3 11/26/2018     05/19/2017    K 4 3 11/26/2018    CO2 26 11/26/2018    CL 99 (L) 11/26/2018    BUN 38 (H) 11/26/2018    CREATININE 1 44 (H) 11/26/2018     Lab Results   Component Value Date    WBC 17 63 (H) 11/26/2018    HGB 12 4 11/26/2018    HCT 38 1 11/26/2018    MCV 88 11/26/2018     11/26/2018     Lab Results   Component Value Date    ALT 24 11/25/2018    AST 20 11/25/2018    ALKPHOS 90 11/25/2018    BILITOT 0 9 05/19/2017     No results found for: AMYLASE  Lab Results   Component Value Date    LIPASE 173 11/25/2018     No results found for: IRON, TIBC, FERRITIN  No results found for: INR        Ct Chest Abdomen Pelvis W Contrast    Result Date: 11/25/2018  Narrative: CT CHEST, ABDOMEN AND PELVIS WITH IV CONTRAST INDICATION:   pneumonia, vomiting, diarrhea  WBC 29  COMPARISON:  None  TECHNIQUE: CT examination of the chest, abdomen and pelvis was performed  Axial, sagittal, and coronal 2D reformatted images were created from the source data and submitted for interpretation  Radiation dose length product (DLP) for this visit:  771 78 mGy-cm   This examination, like all CT scans performed in the [facility], was performed utilizing techniques to minimize radiation dose exposure, including the use of iterative  reconstruction and automated exposure control  IV Contrast:  100 mL of iodixanol (VISIPAQUE) Enteric Contrast: Enteric contrast was administered  FINDINGS: CHEST LUNGS:  Airspace opacities are seen throughout the lung bases  Tree-in-bud airspace opacities in the left upper lobe are visualized  The trachea and central bronchial tree are patent  PLEURA:  Unremarkable  HEART/GREAT VESSELS:  Atherosclerotic changes are noted in thoracic aorta and coronary arteries  MEDIASTINUM AND TERRI:  Subcentimeter mediastinal and hilar lymph nodes are visualized  CHEST WALL AND LOWER NECK:   Unremarkable  ABDOMEN LIVER/BILIARY TREE:  One or more subcentimeter sharply circumscribed low-density hepatic lesion(s) are noted, too small to accurately characterize, but statistically most likely to represent subcentimeter hepatic cysts  No suspicious solid hepatic lesion is identified  Hepatic contours are normal   No biliary dilatation  GALLBLADDER:  No calcified gallstones  No pericholecystic inflammatory change  SPLEEN:  Unremarkable  PANCREAS:  Unremarkable  ADRENAL GLANDS:  Unremarkable  KIDNEYS/URETERS:  One or more simple renal cyst(s) is noted  Otherwise unremarkable kidneys  No hydronephrosis  STOMACH AND BOWEL:  Colonic diverticulosis without evidence of acute diverticulitis  Liquid stool is seen within the colon  Mild thickening of the sigmoid colon wall is seen  APPENDIX:  No findings to suggest appendicitis  ABDOMINOPELVIC CAVITY:  No ascites or free intraperitoneal air  No lymphadenopathy  VESSELS:  Atherosclerotic changes are present  No evidence of aneurysm  PELVIS REPRODUCTIVE ORGANS:  Unremarkable for patient's age  URINARY BLADDER:  Unremarkable  ABDOMINAL WALL/INGUINAL REGIONS:  Fat-containing right inguinal hernia is visualized  OSSEOUS STRUCTURES:  No acute fracture or destructive osseous lesion  Impression: Airspace opacities throughout the lung bases and radionuclide airspace opacities within the left upper lobe which may represent infectious etiology follow-up to resolution is recommended  Mild thickening of the sigmoid colon which may represent colitis  Follow-up with gastroenterology is recommended  The study was marked in [facility] for immediate notification  Workstation performed: [id number]     Counseling / Coordination of Care  Total floor / unit time spent today 25 minutes   Greater than 50% of total time was spent with the patient and / or family counseling and / or coordination of care  A description of the counseling / coordination of care: 15      Leon Kirkland

## 2018-11-26 NOTE — MALNUTRITION/BMI
This medical record reflects one or more clinical indicators suggestive of malnutrition and/or morbid obesity  Malnutrition Findings:      Degree of Malnutrition: Malnutrition of moderate degree  Malnutrition Characteristics: Inadequate energy, Weight loss (Pt presents with moderate malnutrition as evidenced by < 75% po intake >7 days and greater than 5% wt loss in 1 month  Treat with diet and supplements  )    BMI Findings: Body mass index is 23 92 kg/m²  See Nutrition note dated 11/26/2018 for additional details  Completed nutrition assessment is viewable in the nutrition documentation

## 2018-11-26 NOTE — H&P
[address] [facility]    H&P- Katina Cuellar 1945, 71 y o  male MRN: [id number]    Unit/Bed#: [address] [place] [address] Encounter: [id number]    Primary Care Provider: Damaris Castellon DO   Date and time admitted to hospital: 11/25/2018  5:13 PM        * Pneumonia   Assessment & Plan    · Levaquin 750 mg IV q48h, renally dosed, day 3  · Sputum culture and gram stain  · Strep pneumonia urine, legionella urine antigen  · Procalcitonin       EROS (acute kidney injury) ([facility] [address] )   Assessment & Plan    · Cr 1 64, baseline 1 0 - 1 2  ·  mL/hr  · BMP in AM  · Avoid nephrotoxins, hold spironolactone     Colitis   Assessment & Plan    · Levaquin 750 mg IV Q 40 H, renally dosed, day 3  · Vancomycin 125 mg p o  Q 6h, discontinue if C diff negative  · NPO  ·  mL/hour  · Zofran 4 mg IV q 6h p r n  Nausea  · C diff pending     Type 2 diabetes mellitus without complication, without long-term current use of insulin ([facility])   Assessment & Plan    Lab Results   Component Value Date    HGBA1C 7 1 (H) 08/28/2018       Recent Labs      11/25/18   2231   POCGLU  218*       Blood Sugar Average: Last 72 hrs:  (P) 218     · A1c in a m   · Hold oral glycemic control  · SSI coverage with Accu-Cheks q 6h, patient NPO         VTE Prophylaxis: Heparin  / sequential compression device   Code Status: Full  POLST: There is no POLST form on file for this patient (pre-hospital)  Discussion with family: Wife at bedside    Anticipated Length of Stay:  Patient will be admitted on an Inpatient basis with an anticipated length of stay of  Greater than 2 midnights  Justification for Hospital Stay: Pnuemonia with failed outpatient therapy, colitis    Chief Complaint:   Vomiting  Diarrhea    History of Present Illness:    Katina Cuellar is a 71 y o  male with a PMH for AFib who presents with diarrhea and vomiting that began earlier today  Patient also complains of abdominal pain, nausea    Patient was diagnosed with pneumonia approximately 1 week ago and started on doxycycline  Patient experienced a rash and was switched to secondary antibiotic cefuroxime  Patient continued to suffer from fever, chills, shortness of breath and was placed on Levaquin by his PCP  Patient could not keep this medication down due to constant vomiting  He states he is unable to eat  Patient does have a history of colon cancer with his last treatment in July 2018  Review of Systems:    Review of Systems   Constitutional: Negative for chills, diaphoresis and fatigue  HENT: Positive for congestion  Negative for rhinorrhea, sinus pain and sinus pressure  Eyes: Negative for photophobia and visual disturbance  Respiratory: Positive for cough  Negative for shortness of breath and wheezing  Cardiovascular: Negative for chest pain, palpitations and leg swelling  Gastrointestinal: Positive for abdominal pain, diarrhea and vomiting  Negative for constipation and nausea  Endocrine: Negative for polydipsia, polyphagia and polyuria  Genitourinary: Negative for dysuria, flank pain, hematuria and urgency  Musculoskeletal: Negative for back pain, neck pain and neck stiffness  Skin: Negative for pallor, rash and wound  Neurological: Negative for dizziness, tremors, syncope, weakness, light-headedness and headaches  All other systems reviewed and are negative  Past Medical and Surgical History:     Past Medical History:   Diagnosis Date    Atrial fibrillation ([facility] [address] )     Hx of CABG     Nicotine dependence     Pneumonia        Past Surgical History:   Procedure Laterality Date    AORTIC VALVE REPLACEMENT      COLECTOMY      COLONOSCOPY      DENTAL SURGERY      KNEE SURGERY      LYMPHADENECTOMY      OTHER SURGICAL HISTORY      MAZE PROCEDURE       Meds/Allergies:    Prior to Admission medications    Medication Sig Start Date End Date Taking?  Authorizing Provider   aspirin ( ASPIRIN) 325 mg tablet Take 1 tablet by mouth daily 9/14/12   Historical Provider, MD   irbesartan (AVAPRO) 300 mg tablet Take 1 tablet by mouth daily 10/7/14   Historical Provider, MD   levofloxacin (LEVAQUIN) 750 mg tablet Take 1 tablet (750 mg total) by mouth every 24 hours for 5 days 11/23/18 11/28/18  Sandi Pop,    metFORMIN (GLUCOPHAGE-XR) 750 mg 24 hr tablet Take 1 tablet (750 mg total) by mouth daily with dinner for 30 days 5/25/18 6/24/18  Sandi Pop,    metoprolol succinate (TOPROL-XL) 100 mg 24 hr tablet  2/25/18   Historical Provider, MD   Multiple Vitamins-Minerals (ICAPS PO) Take 1 capsule by mouth daily 10/7/14   Historical Provider, MD   omeprazole (PRILOSEC) 20 mg delayed release capsule Take 1 capsule by mouth Daily 9/14/12   Historical Provider, MD   pravastatin (PRAVACHOL) 40 mg tablet take 1 tablet by mouth once daily at bedtime 11/16/18   Sandi Pop, DO   spironolactone (ALDACTONE) 25 mg tablet Take 1 tablet by mouth daily 11/3/17   Historical Provider, MD GIPSON have reviewed home medications with patient family member  Allergies:    Allergies   Allergen Reactions    Ceftin [Cefuroxime] Itching       Social History:     Marital Status: /Civil Union   Occupation: Retired  Patient Pre-hospital Living Situation:  Self  Patient Pre-hospital Level of Mobility: Full  Patient Pre-hospital Diet Restrictions:  None  Substance Use History:   History   Alcohol Use    Yes     History   Smoking Status    Former Smoker   Smokeless Tobacco    Never Used     History   Drug Use No       Family History:    Family History   Problem Relation Age of Onset    Heart block Family        Physical Exam:     Vitals:   Blood Pressure: 115/59 (11/25/18 2232)  Pulse: 100 (11/25/18 2256)  Temperature: 98 2 °F (36 8 °C) (11/25/18 2232)  Temp Source: Oral (11/25/18 2232)  Respirations: 18 (11/25/18 2256)  Height: 5' 11" (180 3 cm) (11/25/18 2104)  Weight - Scale: 77 kg (169 lb 12 1 oz) (11/25/18 2104)  SpO2: 96 % (11/25/18 2256)    Physical Exam   Constitutional: He is oriented to person, place, and time  Vital signs are normal  He appears well-developed and well-nourished  Non-toxic appearance  No distress  HENT:   Head: Normocephalic and atraumatic  Mouth/Throat: Oropharynx is clear and moist and mucous membranes are normal  Normal dentition  No oropharyngeal exudate  Eyes: Pupils are equal, round, and reactive to light  Conjunctivae are normal  Right eye exhibits no discharge  Left eye exhibits no discharge  No scleral icterus  Neck: No JVD present  No tracheal deviation and no erythema present  Cardiovascular: Normal rate, regular rhythm, normal heart sounds, intact distal pulses and normal pulses  Exam reveals no gallop and no friction rub  No murmur heard  Pulmonary/Chest: Effort normal  No accessory muscle usage or stridor  No respiratory distress  He has no decreased breath sounds  He has no wheezes  He has rales  Abdominal: Soft  Bowel sounds are normal  He exhibits no distension and no mass  There is tenderness  There is no rebound  Musculoskeletal: He exhibits no edema, tenderness or deformity  Neurological: He is alert and oriented to person, place, and time  GCS eye subscore is 4  GCS verbal subscore is 5  GCS motor subscore is 6  Skin: Skin is warm and dry  No rash noted  He is not diaphoretic  No erythema  No pallor  Psychiatric: He has a normal mood and affect  His behavior is normal    Nursing note and vitals reviewed  Additional Data:     Lab Results: I have personally reviewed pertinent reports          Results from last 7 days  Lab Units 11/26/18  0008 11/25/18  1744   WBC Thousand/uL  --  29 23*   HEMOGLOBIN g/dL  --  13 9   HEMATOCRIT %  --  42 4   PLATELETS Thousands/uL 381 513*   BANDS PCT %  --  3   LYMPHO PCT %  --  9*   MONO PCT %  --  4   EOS PCT %  --  0       Results from last 7 days  Lab Units 11/25/18  1744   SODIUM mmol/L 131*   POTASSIUM mmol/L 4 6   CHLORIDE mmol/L 93*   CO2 mmol/L 22   BUN mg/dL 40* CREATININE mg/dL 1 64*   ANION GAP mmol/L 16*   CALCIUM mg/dL 9 3   ALBUMIN g/dL 3 3*   TOTAL BILIRUBIN mg/dL 0 50   ALK PHOS U/L 90   ALT U/L 24   AST U/L 20   GLUCOSE RANDOM mg/dL 306*           Results from last 7 days  Lab Units 11/25/18  2231   POC GLUCOSE mg/dl 218*           Results from last 7 days  Lab Units 11/25/18  1903   LACTIC ACID mmol/L 1 4       Imaging: I have personally reviewed pertinent reports  CT chest abdomen pelvis w contrast   Final Result by Lamine Grimes DO (11/25 1930)      Airspace opacities throughout the lung bases and radionuclide airspace opacities within the left upper lobe which may represent infectious etiology follow-up to resolution is recommended  Mild thickening of the sigmoid colon which may represent colitis  Follow-up with gastroenterology is recommended  The study was marked in [facility] for immediate notification  Workstation performed: [id number]             EKG, Pathology, and Other Studies Reviewed on Admission:   · EKG: Sinus Tachycardia    Allscripts / Epic Records Reviewed: Yes     ** Please Note: This note has been constructed using a voice recognition system   **

## 2018-11-26 NOTE — ASSESSMENT & PLAN NOTE
Lab Results   Component Value Date    HGBA1C 7 1 (H) 08/28/2018       Recent Labs      11/25/18   2231   POCGLU  218*       Blood Sugar Average: Last 72 hrs:  (P) 218     · A1c in a m   · Hold oral glycemic control  · SSI coverage with Accu-Cheks q 6h, patient NPO

## 2018-11-26 NOTE — SOCIAL WORK
Met with Pt  Pt's wife at bedside  Pt presents AA&Ox3  Discussed role of   Pt lives with wife in [facility] [place], has 1st floor setup, 1 nelly  Pt is independent with adls and ambulation  Pt and Pt's wife drives and goes grocery shopping  Pt goes to [employer] on [address]  No VNA in past  Pt does not feel he will need services upon discharge  Will follow

## 2018-11-26 NOTE — CONSULTS
Pulmonary Consultation   Hetal Hastings 71 y o  male MRN: [id number]  Unit/Bed#: [address] [facility] 201-01 Encounter: [id number]      Reason for consultation:  Pneumonia    Requesting physician:  Dr Roseanne Boucher    Impressions/Plans:    1  Abnormal CT chest with bilateral infiltrates and recent pneumonia  He has completed multiple courses of antibiotics including doxycycline and 3 days of cefuroxime/2 days of Levaquin  His procalcitonin is normal   While this may have been a bacterial infection initially, he has completed antibiotics and therefore would observe off antibiotics  This may also all be a viral illness  · D#7 of recent antibiotic course would D/C after tonight's dose of levaquin  · Flagyl/vancomycin p o  per primary team  · Repeat CXR in 4-6 weeks      2  Recurrent pneumonia - possibly related to aspiration as he tells me that he has had trouble choking/swallowing since radiation  Was treated with IVIG for 2 years and there was no change in recurrence  · Speech therapy evaluation with possible VBS    3  Vomiting/diarrhea  This may be related to viral illness along with pneumonia  This may be related to multiple antibiotic courses  C diff is pending  · Follow-up C diff  · Flagyl/PO vancomycin per primary team      History of Present Illness   HPI:  Hetal Hastings is a 71 y o  male who presents after 6-8 weeks of illness  His wife states that almost 2 months ago he started with fevers, cough productive of green phlegm and overall feeling poorly  He was seen by his primary care physician and diagnosed with sinusitis  He was given a 7 day course of doxycycline  He tells me that he felt better but then a week or so later had symptoms again  He waited for a while but then a week ago with seen at an urgent care facility in told that he had bronchitis  They did a chest x-ray and then told him that he had pneumonia  They started him on cefuroxime    After 3 days of cefuroxime and a narcotic cough syrup he developed erythema and itching  He then called his primary care physician who stopped cefuroxime and started Levaquin  After 2 days of Levaquin he had significant vomiting and diarrhea and presented to the emergency room yesterday feeling poorly  He tells me that his cough has actually stopped 2 days ago  He no longer has any phlegm production  He has not had any diarrhea since being in the hospital   His vomiting seems to have improved as well  He denies any change in chronic joint pain  He denies any other rashes  He denies any headaches  He was a former smoker who quit 9 years ago  He has never been told that he has asthma or  D  He has had recurrent pneumonia  He was seen by an immunologist few years ago and underwent 2 years of IVIG infusions with no significant change in recurrence of pneumonia  He is no longer receiving chemotherapy or radiation therapy  He did notice after radiation that he had a hard time swallowing  At times he chokes on food or coughs after eating  He currently does not have any fevers or chills  He overall still feels weak  Review of systems:  12 point review of systems was completed and was otherwise negative except as listed in HPI        Historical Information   Past Medical History:   Diagnosis Date    [facility]     CAD (coronary artery disease)     [facility]     Diabetes mellitus [facility]     Fatty liver     GERD (gastroesophageal reflux disease)     Hyperlipidemia     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     Osteoarthritis of both knees     Pneumonia      Past Surgical History:   Procedure Laterality Date    AORTIC VALVE REPLACEMENT      COLECTOMY      COLONOSCOPY      DENTAL SURGERY      KNEE SURGERY      LYMPHADENECTOMY      OTHER SURGICAL HISTORY      MAZE PROCEDURE     Family History   Problem Relation Age of Onset    Heart block Family        Occupational History:   Retired maintenance    Social History:  History   Smoking Status    Former Smoker    Packs/day: 1 00    Years: 40 00    Types: Cigarettes    Quit date: 2009   Smokeless Tobacco    Never Used     History   Alcohol Use    Yes     History   Drug Use No     Marital Status: /Civil Union      Meds/Allergies   Current Facility-Administered Medications   Medication Dose Route Frequency    acetaminophen (TYLENOL) tablet 650 mg  650 mg Oral Q6H PRN    aspirin tablet 325 mg  325 mg Oral Daily    calcium carbonate (TUMS) chewable tablet 1,000 mg  1,000 mg Oral Daily PRN    heparin (porcine) subcutaneous injection 5,000 Units  5,000 Units Subcutaneous Q8H [address]    insulin lispro (HumaLOG) 100 units/mL subcutaneous injection 1-6 Units  1-6 Units Subcutaneous Q6H [address]    levofloxacin (LEVAQUIN) IVPB (premix) 750 mg  750 mg Intravenous Q48H    losartan (COZAAR) tablet 100 mg  100 mg Oral Daily    metoprolol succinate (TOPROL-XL) 24 hr tablet 100 mg  100 mg Oral Daily    metroNIDAZOLE (FLAGYL) IVPB (premix) 500 mg  500 mg Intravenous Q8H    ondansetron (ZOFRAN) injection 4 mg  4 mg Intravenous Q6H PRN    pantoprazole (PROTONIX) EC tablet 40 mg  40 mg Oral Early Morning    pravastatin (PRAVACHOL) tablet 40 mg  40 mg Oral HS    sodium chloride 0 9 % infusion  100 mL/hr Intravenous Continuous    vancomycin (VANCOCIN) oral solution 125 mg  125 mg Oral Q6H [address]     Prescriptions Prior to Admission   Medication    aspirin (HM ASPIRIN) 325 mg tablet    irbesartan (AVAPRO) 300 mg tablet    levofloxacin (LEVAQUIN) 750 mg tablet    metFORMIN (GLUCOPHAGE-XR) 750 mg 24 hr tablet    metoprolol succinate (TOPROL-XL) 100 mg 24 hr tablet    Multiple Vitamins-Minerals (ICAPS PO)    omeprazole (PRILOSEC) 20 mg delayed release capsule    pravastatin (PRAVACHOL) 40 mg tablet    spironolactone (ALDACTONE) 25 mg tablet     Allergies   Allergen Reactions    Ceftin [Cefuroxime] Itching       Vitals: Blood pressure 121/60, pulse 70, temperature 98 2 °F (36 8 °C), temperature source Oral, resp  rate 18, height 5' 11" (1 803 m), weight 77 8 kg (171 lb 8 3 oz), SpO2 94 % , room air, Body mass index is 23 92 kg/m²        Intake/Output Summary (Last 24 hours) at 11/26/18 0959  Last data filed at 11/26/18 0501   Gross per 24 hour   Intake             1000 ml   Output              550 ml   Net              450 ml       Physical exam:    General Appearance:    Alert, cooperative, no conversational dyspnea or accessory     muscle use       Head/eyes:    Normocephalic, without obvious abnormality, atraumatic,         PERRL, sclera non-injected, nonicteric    Nose:   Nares normal, mucosa normal, no drainage or sinus tenderness   Mouth:   Moist mucous membranes, no thrush, normal dentition   Neck:   Supple, trachea midline, no adenopathy; JVD not elevated   Lungs:     Clear to auscultation bilateral without wheeze, rales or rhonchi   Chest Wall:    No tenderness or deformity    Heart:    Regular rate and rhythm, S1 and S2 normal, no murmur, rub   or gallop   Abdomen:     Soft, non-tender, bowel sounds active all four quadrants,     no masses, no organomegaly   Extremities:   Extremities normal, atraumatic, no cyanosis or edema   Skin:   Warm, dry, turgor normal, no rashes or lesions   Lymph nodes:   No Cervical or supraclavicular lymphadenopathy   Neurologic:   CNII-XII grossly intact, normal strength, non-focal         Labs:   CBC:   Lab Results   Component Value Date    WBC 17 63 (H) 11/26/2018    HGB 12 4 11/26/2018    HCT 38 1 11/26/2018    MCV 88 11/26/2018     11/26/2018    MCH 28 8 11/26/2018    MCHC 32 5 11/26/2018    RDW 13 7 11/26/2018    MPV 9 7 11/26/2018    NRBC 0 11/26/2018   , CMP:   Lab Results   Component Value Date    SODIUM 135 (L) 11/26/2018    K 4 3 11/26/2018    CL 99 (L) 11/26/2018    CO2 26 11/26/2018    BUN 38 (H) 11/26/2018    CREATININE 1 44 (H) 11/26/2018    CALCIUM 8 3 11/26/2018    AST 20 11/25/2018    ALT 24 11/25/2018    ALKPHOS 90 11/25/2018    EGFR 49 11/26/2018       Imaging and other studies: I have personally reviewed pertinent films in PACS  CT chest 11/25/2018: Bilateral basilar infiltrates consistent with atypical infectious process  Left upper lobe tree-in-bud opacities  Mild sigmoid colitis  Pulmonary function testing:   None available    Major Hamper, DO      Portions of the record may have been created with voice recognition software  Occasional wrong word or "sound a like" substitutions may have occurred due to the inherent limitations of voice recognition software  Read the chart carefully and recognize, using context, where substitutions have occurred

## 2018-11-26 NOTE — ASSESSMENT & PLAN NOTE
· Levaquin 750 mg IV Q 40 H, renally dosed, day 3  · Vancomycin 125 mg p o  Q 6h, discontinue if C diff negative  · NPO  ·  mL/hour  · Zofran 4 mg IV q 6h p r n   Nausea  · C diff pending

## 2018-11-27 ENCOUNTER — APPOINTMENT (INPATIENT)
Dept: RADIOLOGY | Facility: HOSPITAL | Age: 73
DRG: 682 | End: 2018-11-27
Payer: COMMERCIAL

## 2018-11-27 PROBLEM — E44.0 MODERATE PROTEIN-CALORIE MALNUTRITION (HCC): Status: ACTIVE | Noted: 2018-11-27

## 2018-11-27 LAB
ANION GAP SERPL CALCULATED.3IONS-SCNC: 9 MMOL/L (ref 4–13)
BASOPHILS # BLD AUTO: 0.02 THOUSANDS/ΜL (ref 0–0.1)
BASOPHILS NFR BLD AUTO: 0 % (ref 0–1)
BUN SERPL-MCNC: 19 MG/DL (ref 5–25)
C DIFF TOX GENS STL QL NAA+PROBE: NORMAL
CALCIUM SERPL-MCNC: 7.6 MG/DL (ref 8.3–10.1)
CHLORIDE SERPL-SCNC: 101 MMOL/L (ref 100–108)
CO2 SERPL-SCNC: 25 MMOL/L (ref 21–32)
CREAT SERPL-MCNC: 1.17 MG/DL (ref 0.6–1.3)
EOSINOPHIL # BLD AUTO: 0.18 THOUSAND/ΜL (ref 0–0.61)
EOSINOPHIL NFR BLD AUTO: 1 % (ref 0–6)
ERYTHROCYTE [DISTWIDTH] IN BLOOD BY AUTOMATED COUNT: 14.4 % (ref 11.6–15.1)
GFR SERPL CREATININE-BSD FRML MDRD: 62 ML/MIN/1.73SQ M
GLUCOSE SERPL-MCNC: 104 MG/DL (ref 65–140)
GLUCOSE SERPL-MCNC: 104 MG/DL (ref 65–140)
GLUCOSE SERPL-MCNC: 113 MG/DL (ref 65–140)
GLUCOSE SERPL-MCNC: 88 MG/DL (ref 65–140)
GLUCOSE SERPL-MCNC: 91 MG/DL (ref 65–140)
HCT VFR BLD AUTO: 34.5 % (ref 36.5–49.3)
HGB BLD-MCNC: 11.3 G/DL (ref 12–17)
IMM GRANULOCYTES # BLD AUTO: 0.3 THOUSAND/UL (ref 0–0.2)
IMM GRANULOCYTES NFR BLD AUTO: 2 % (ref 0–2)
LYMPHOCYTES # BLD AUTO: 1.82 THOUSANDS/ΜL (ref 0.6–4.47)
LYMPHOCYTES NFR BLD AUTO: 13 % (ref 14–44)
MCH RBC QN AUTO: 28.6 PG (ref 26.8–34.3)
MCHC RBC AUTO-ENTMCNC: 32.8 G/DL (ref 31.4–37.4)
MCV RBC AUTO: 87 FL (ref 82–98)
MONOCYTES # BLD AUTO: 1.46 THOUSAND/ΜL (ref 0.17–1.22)
MONOCYTES NFR BLD AUTO: 11 % (ref 4–12)
NEUTROPHILS # BLD AUTO: 10.01 THOUSANDS/ΜL (ref 1.85–7.62)
NEUTS SEG NFR BLD AUTO: 73 % (ref 43–75)
PLATELET # BLD AUTO: 272 THOUSANDS/UL (ref 149–390)
PMV BLD AUTO: 9.9 FL (ref 8.9–12.7)
POTASSIUM SERPL-SCNC: 4.2 MMOL/L (ref 3.5–5.3)
RBC # BLD AUTO: 3.95 MILLION/UL (ref 3.88–5.62)
SODIUM SERPL-SCNC: 135 MMOL/L (ref 136–145)
WBC # BLD AUTO: 13.79 THOUSAND/UL (ref 4.31–10.16)

## 2018-11-27 PROCEDURE — G8997 SWALLOW GOAL STATUS: HCPCS

## 2018-11-27 PROCEDURE — 99232 SBSQ HOSP IP/OBS MODERATE 35: CPT | Performed by: INTERNAL MEDICINE

## 2018-11-27 PROCEDURE — 80048 BASIC METABOLIC PNL TOTAL CA: CPT | Performed by: INTERNAL MEDICINE

## 2018-11-27 PROCEDURE — G8996 SWALLOW CURRENT STATUS: HCPCS

## 2018-11-27 PROCEDURE — 74230 X-RAY XM SWLNG FUNCJ C+: CPT

## 2018-11-27 PROCEDURE — 85025 COMPLETE CBC W/AUTO DIFF WBC: CPT | Performed by: INTERNAL MEDICINE

## 2018-11-27 PROCEDURE — 92611 MOTION FLUOROSCOPY/SWALLOW: CPT

## 2018-11-27 PROCEDURE — 82948 REAGENT STRIP/BLOOD GLUCOSE: CPT

## 2018-11-27 RX ADMIN — METRONIDAZOLE 500 MG: 500 INJECTION, SOLUTION INTRAVENOUS at 14:36

## 2018-11-27 RX ADMIN — ASPIRIN 325 MG ORAL TABLET 325 MG: 325 PILL ORAL at 08:35

## 2018-11-27 RX ADMIN — METOPROLOL SUCCINATE 100 MG: 50 TABLET, EXTENDED RELEASE ORAL at 08:34

## 2018-11-27 RX ADMIN — SODIUM CHLORIDE 100 ML/HR: 0.9 INJECTION, SOLUTION INTRAVENOUS at 21:54

## 2018-11-27 RX ADMIN — PRAVASTATIN SODIUM 40 MG: 40 TABLET ORAL at 21:54

## 2018-11-27 RX ADMIN — Medication 125 MG: at 00:09

## 2018-11-27 RX ADMIN — HEPARIN SODIUM 5000 UNITS: 5000 INJECTION INTRAVENOUS; SUBCUTANEOUS at 14:35

## 2018-11-27 RX ADMIN — METRONIDAZOLE 500 MG: 500 INJECTION, SOLUTION INTRAVENOUS at 08:34

## 2018-11-27 RX ADMIN — PANTOPRAZOLE SODIUM 40 MG: 40 TABLET, DELAYED RELEASE ORAL at 06:37

## 2018-11-27 RX ADMIN — HEPARIN SODIUM 5000 UNITS: 5000 INJECTION INTRAVENOUS; SUBCUTANEOUS at 06:37

## 2018-11-27 RX ADMIN — Medication 125 MG: at 06:37

## 2018-11-27 RX ADMIN — LOSARTAN POTASSIUM 100 MG: 50 TABLET ORAL at 08:34

## 2018-11-27 RX ADMIN — SODIUM CHLORIDE 100 ML/HR: 0.9 INJECTION, SOLUTION INTRAVENOUS at 08:32

## 2018-11-27 RX ADMIN — HEPARIN SODIUM 5000 UNITS: 5000 INJECTION INTRAVENOUS; SUBCUTANEOUS at 21:54

## 2018-11-27 NOTE — PROGRESS NOTES
Katina Cuellar  [id number]    71 y o   male      ASSESSMENT  1  Diarrhea with mild colitis noted on CT scan of the abdomen/pelvis at sigmoid colon  Increased risk for C diff with recent antibiotic exposure  2  Acute onset of nausea,  vomiting and diarrhea  Possibly secondary gastroenteritis  Exclude C diff  3  Pneumonia, recurrent  he has been on 2 antibiotics recently for this - questionable aspiration  4  History of right colon carcinoma in 2013 status post resection  In 2014 repeat colonoscopy demonstrated sigmoid colon and rectum with MALT, representing a low grade lymphoma  Status post chemo and radiation  5  Dysphagia, to solids and liquids  - longstanding since undergoing lymph node removal and radiation in 1998       PLAN  1  IVF's  2  Await stool sample for C diff that was collected yesterday  3  Continue oral vancomycin and IV Flagyl for presumed C diff  4  Eventual colonoscopy in 4-6 weeks  5  Video barium swallow initially  May require an eventual EGD with dilation    Chief Complaint   Patient presents with    Vomiting     patient presents to the ER stating he was diagnosed with pneumonia this week and put on another antibiotic, started with vomiting and diarrhea this morning, states is dehydrated   Diarrhea       SUBJECTIVE/HPI   Two watery stools this morning  Denies any abdominal pain or other GI complaints      /56 (BP Location: Left arm)   Pulse 87   Temp 99 3 °F (37 4 °C) (Oral)   Resp 17   Ht 5' 11" (1 803 m)   Wt 78 kg (171 lb 15 3 oz)   SpO2 93%   BMI 23 98 kg/m²       PHYSICALEXAM  Constitutional:  Ongoing productive cough, no acute distress, non-toxic appearance   Eyes:   conjunctiva normal   HENT:  Atraumatic  Respiratory:  Few rhonchi left base, diminished breath sounds  Cardiovascular:  Normal rate   GI:  Soft, nondistended, normal bowel sounds, nontender  Musculoskeletal:  No edema  Neurologic:  Alert & oriented x 3  Psychiatric:  Speech and behavior appropriate Lab Results   Component Value Date    GLUCOSE 204 (H) 10/13/2017    CALCIUM 7 6 (L) 11/27/2018     05/19/2017    K 4 2 11/27/2018    CO2 25 11/27/2018     11/27/2018    BUN 19 11/27/2018    CREATININE 1 17 11/27/2018     Lab Results   Component Value Date    WBC 13 79 (H) 11/27/2018    HGB 11 3 (L) 11/27/2018    HCT 34 5 (L) 11/27/2018    MCV 87 11/27/2018     11/27/2018     Lab Results   Component Value Date    ALT 24 11/25/2018    AST 20 11/25/2018    ALKPHOS 90 11/25/2018    BILITOT 0 9 05/19/2017     No results found for: AMYLASE  Lab Results   Component Value Date    LIPASE 173 11/25/2018     No results found for: IRON, TIBC, FERRITIN  No results found for: INR    Counseling / Coordination of Care  Total floor / unit time spent today 25 minutes  Greater than 50% of total time was spent with the patient and / or family counseling and / or coordination of care  A description of the counseling / coordination of care: 15    Candi Mejia

## 2018-11-27 NOTE — SPEECH THERAPY NOTE
Video Barium Swallow Study    Summary: The patient presented with a mild pharyngeal dysphagia characterized by inconsistent epiglottic inversion, with decreased base of tongue movement  This resulted in valleculae retention with regular solids, and barium tablet  Recommendations:  Diet: Mechanical soft   Liquids: Thin liquids    Meds: CRUSHED in puree     Strategies: Take small bites/sips, alternate liquids and solids, moisten dry solids and crush medications   Upright position: Yes     F/u ST tx: Yes  To ensure safety with diet and with medication administration, also educate patient and wife on diet prep at home  Recommend outpatient ST for possible laryngeal strengthening exercises and repeat VBS  Therapy Prognosis: Fair   Prognosis considerations: History of radiation treatment     Aspiration Precautions: Yes   Aspiration precautions posted  If a dedicated assessment of the esophagus is desired, consider esophagram/barium swallow  Patient's goal: Patient reported having intermittent difficulty swallowing since radiation treatment in the 1990's for Non-Hodgkin's Lymphoma  Patient's wife reported consistent difficulty swallowing medications and regular solids at home  Goals:  Pt will tolerate least restrictive diet w/out s/s aspiration or oral/pharyngeal difficulties  Pt and wife will be educated on diet prep for safe discharge home       Pt is a 72 y/o male admitted to [address] [place] [address] with diarrhea and vomiting at home  The patient c/o abdomin pain, nausea and cough with SOB  Because of the vomiting, the patient was unable to take his medications at home  Patient was recently diagnosed with pneumonia and was on day 3 of antibiotic  Patient with recent history of colon cancer, with his last radiation treatment in July 2018        PMH: A-fib, CABG, pneumonia       Previous VBS: N/A    Current Diet: Regular diet with thin liquids      Premorbid diet: Regular diet with thin liquids Dentition: WFL    O2 requirement: Patient on RA    Oral mech:  Strength and ROM: WFL    Vocal Quality/Speech: Clear vocal quality and intelligible speech     Cognitive status: Adequate       Consistencies administered: Barium laden applesauce, soft solid, hard solid, nectar thick, thin liquids, 13mm barium pill  Liquids were administered by cup and straw  Pt was seated laterally at 90 degrees  Pt viewed in AP position    Oral stage:  WNL  Lip closure: WFL  Mastication: WFL  Bolus formation: WFL  Bolus control: WFL  Transfer: WFL   Residue: None     Pharyngeal stage:  Mild  Swallow promptness: WFL  Spill to valleculae: No   Spill to pyriforms: No   Epiglottic inversion: Inconsistent inversion observed  Laryngeal rise: WFL  Pharyngeal constriction: Decreased  Consider decreased force from base of tongue resulting in valleculae retention  Vallecular retention: Yes  With regular solids and whole barium tablet  Unable to clear during study  Pyriform retention: No  PPW coating: Minimal   Osteophytes: No   CP prominence: Yes  Appeared prominent on limited swallows  Unremarkable effect on swallow  Retropulsion from prominence: No   Transient penetration: No   Epiglottic undercoat: No  Penetration: No  Aspiration: No   Strategies: Chin tuck, head turn left and right, hard swallows  All unsuccessful at helping to clear retained barium tablet   Response to aspiration: No aspiration observed     Screening of Esophageal stage:   WNL

## 2018-11-27 NOTE — PHYSICAL THERAPY NOTE
PT screen  Order rec'd chart reveiwed spoke with nurse and met with pt   Ind mobile and denies need for PT services  No device or other concerns   Screen only d/c PT

## 2018-11-27 NOTE — PROCEDURES
Video Barium Swallow Study     Summary: The patient presented with a mild pharyngeal dysphagia characterized by inconsistent epiglottic inversion, with decreased base of tongue movement  This resulted in valleculae retention with regular solids, and barium tablet          Recommendations:  Diet: Mechanical soft   Liquids: Thin liquids              Meds: CRUSHED in puree      Strategies: Take small bites/sips, alternate liquids and solids, moisten dry solids and crush medications   Upright position: Yes      F/u ST tx: Yes  To ensure safety with diet and with medication administration, also educate patient and wife on diet prep at home  Recommend outpatient ST for possible laryngeal strengthening exercises and repeat VBS       Therapy Prognosis: Fair   Prognosis considerations: History of radiation treatment      Aspiration Precautions: Yes   Aspiration precautions posted      If a dedicated assessment of the esophagus is desired, consider esophagram/barium swallow       Patient's goal: Patient reported having intermittent difficulty swallowing since radiation treatment in the 1990's for Non-Hodgkin's Lymphoma  Patient's wife reported consistent difficulty swallowing medications and regular solids at home          Goals:  Pt will tolerate least restrictive diet w/out s/s aspiration or oral/pharyngeal difficulties  Pt and wife will be educated on diet prep for safe discharge home         Pt is a 70 y/o male admitted to [address] [place] [address] with diarrhea and vomiting at home  The patient c/o abdomin pain, nausea and cough with SOB  Because of the vomiting, the patient was unable to take his medications at home  Patient was recently diagnosed with pneumonia and was on day 3 of antibiotic   Patient with recent history of colon cancer, with his last radiation treatment in July 2018        PMH: A-fib, CABG, pneumonia         Previous VBS: N/A     Current Diet: Regular diet with thin liquids      Premorbid diet: Regular diet with thin liquids      Dentition: WFL     O2 requirement: Patient on RA     Oral mech:  Strength and ROM: WFL     Vocal Quality/Speech: Clear vocal quality and intelligible speech      Cognitive status: Adequate         Consistencies administered: Barium laden applesauce, soft solid, hard solid, nectar thick, thin liquids, 13mm barium pill  Liquids were administered by cup and straw       Pt was seated laterally at 90 degrees  Pt viewed in AP position     Oral stage:  WNL  Lip closure: WFL  Mastication: WFL  Bolus formation: WFL  Bolus control: WFL  Transfer: WFL   Residue: None      Pharyngeal stage:  Mild  Swallow promptness: WFL  Spill to valleculae: No   Spill to pyriforms: No   Epiglottic inversion: Inconsistent inversion observed  Laryngeal rise: WFL  Pharyngeal constriction: Decreased  Consider decreased force from base of tongue resulting in valleculae retention  Vallecular retention: Yes  With regular solids and whole barium tablet  Unable to clear during study  Pyriform retention: No  PPW coating: Minimal   Osteophytes: No   CP prominence: Yes  Appeared prominent on limited swallows  Unremarkable effect on swallow  Retropulsion from prominence: No   Transient penetration: No   Epiglottic undercoat: No  Penetration: No  Aspiration: No   Strategies: Chin tuck, head turn left and right, hard swallows  All unsuccessful at helping to clear retained barium tablet   Response to aspiration: No aspiration observed      Screening of Esophageal stage:   WNL

## 2018-11-27 NOTE — PROGRESS NOTES
Pulmonary Progress Note  Jake Farmer 71 y o  male MRN: [id number]  Unit/Bed#: [address] [place] [address] Encounter: [id number]      Assesment and Recommendations:    1  Abnormal CT chest with bilateral infiltrates consistent with recent community-acquired pneumonia status post 14 days of antibiotics including doxycycline, ceftaroline and Levaquin  · He completed Levaquin last night  He remains on vancomycin and Flagyl for possible colitis  Will defer to GI for duration of those antibiotics  · He will need repeat chest x-ray in 4-6 weeks to document resolution of infiltrates  · If he has recurrence of worsening cough/fevers/chest pain he would likely need bronchoscopy prior to initiation of more antibiotics        2  Recurrent pneumonia - possible aspiration  · Speech therapy evaluation with possible VBS     3  Vomiting/diarrhea - likely viral enteritis as C diff is negative  · Will defer management of Flagyl/vancomycin to primary team/GI      Chief Complaint:  Return of cough    Subjective: The patient reports that overnight he had some increased cough with phlegm production  He also reported some recurrence of diarrhea  He was able to give a sputum and stool sample  He denies any fevers or chills  Vitals: Blood pressure 122/56, pulse 87, temperature 99 3 °F (37 4 °C), temperature source Oral, resp  rate 17, height 5' 11" (1 803 m), weight 78 kg (171 lb 15 3 oz), SpO2 93 % , room air, Body mass index is 23 98 kg/m²        Intake/Output Summary (Last 24 hours) at 11/27/18 1105  Last data filed at 11/27/18 0830   Gross per 24 hour   Intake              250 ml   Output              415 ml   Net             -165 ml       Physical exam:  General:  Patient is awake, alert, non-toxic and in no acute respiratory distress  Neck: No JVD  CV:  Regular, +S1 and S2, No murmurs, gallops or rubs appreciated  Lungs: Clear to auscultation bilateral without wheeze, rales or rhonci  Abdomen: Soft, +BS, Non-tender, non-distended  Extremities: No clubbing, cyanosis or edema  Neuro: No focal deficits    Labs:   CBC:   Lab Results   Component Value Date    WBC 13 79 (H) 11/27/2018    HGB 11 3 (L) 11/27/2018    HCT 34 5 (L) 11/27/2018    MCV 87 11/27/2018     11/27/2018    MCH 28 6 11/27/2018    MCHC 32 8 11/27/2018    RDW 14 4 11/27/2018    MPV 9 9 11/27/2018   , CMP:   Lab Results   Component Value Date    SODIUM 135 (L) 11/27/2018    K 4 2 11/27/2018     11/27/2018    CO2 25 11/27/2018    BUN 19 11/27/2018    CREATININE 1 17 11/27/2018    CALCIUM 7 6 (L) 11/27/2018    EGFR 62 11/27/2018       Richie Nair DO      Portions of the record may have been created with voice recognition software  Occasional wrong word or "sound a like" substitutions may have occurred due to the inherent limitations of voice recognition software  Read the chart carefully and recognize, using context, where substitutions have occurred

## 2018-11-27 NOTE — PROGRESS NOTES
c diff negative    Still having watery stools( x3 this shift although less in amounts    Denies abd tenderness and nausea    Had daniel clear liquids    Diet increased    Did have a video barium swallow for h/o choking on foods at times at home    breathiing better  Kelvin Merle less coughing remains on RA

## 2018-11-27 NOTE — PLAN OF CARE
Problem: SLP ADULT - SWALLOWING, IMPAIRED  Goal: Initial SLP swallow eval performed  Outcome: Completed Date Met: 11/27/18

## 2018-11-27 NOTE — PROGRESS NOTES
Progress Note - Sebastian Reeves 71 y o  male MRN: [id number]  Unit/Bed#: [address] [place] [address] Encounter: [id number]    Assessment:  Principal Problem:    Pneumonia  Active Problems:    Type 2 diabetes mellitus without complication, without long-term current use of insulin (HCC)    EROS (acute kidney injury) ([facility] [address] )    Colitis    Moderate protein-calorie malnutrition ([facility] [address] )  Resolved Problems:    * No resolved hospital problems  *      Plan:  · Abnormal CT chest with bilateral infiltrates and recent pneumonia  Patient does not seem to have active pneumonia  Levaquin was discontinued  Procalcitonin is normal  Urine for Legionella Streptococcus is negative  Follow-up culture results  Pulmonary follow-up  · Nausea/vomiting/colitis  Stool for C diff is negative  Will advance diet  Will discontinue oral vancomycin  · Diabetes mellitus  Accu-Chek a c  HS with Humalog sliding scale  Continue to hold oral medications  · Hypertension-on Cozaar and Toprol-XL  Malnutrition Findings:  Per nutrition recommendations  Degree of Malnutrition: Malnutrition of moderate degree    BMI Findings: Body mass index is 23 98 kg/m²  · DVT prophylaxis  · Discussed with patient in detail  Subjective:   Patient is seen and examined at bedside  Denies any new complaints  Denies any nausea, vomiting  Had some liquidy stool  Afebrile  All other ROS are negative  Objective:   Vitals: Blood pressure 122/56, pulse 87, temperature 99 3 °F (37 4 °C), temperature source Oral, resp  rate 17, height 5' 11" (1 803 m), weight 78 kg (171 lb 15 3 oz), SpO2 93 %  ,Body mass index is 23 98 kg/m²    SPO2 RA Rest      ED to Hosp-Admission (Current) from 11/25/2018 in [facility] S[facility]   SpO2  93 %   SpO2 Activity  At Rest   O2 Device  None (Room air)   O2 Flow Rate  --        I&O:   Intake/Output Summary (Last 24 hours) at 11/27/18 0923  Last data filed at 11/27/18 0830   Gross per 24 hour   Intake              250 ml Output              415 ml   Net             -165 ml       Physical Exam:    General- Alert, lying comfortably in bed  Not in any acute distress  HEENT- JUAN, EOM intact  Neck- Supple, No JVD  CVS- regular, S1 and S2 normal   Chest- Bilateral Air entry, No rhochi, crackles or wheezing present  Abdomen- soft, nontender, not distended, no guarding or rigidity, BS+  Extremities-  No pedal edema, No calf tenderness                         Normal ROM in all extremities  CNS-   Alert, awake and orientedx3  No focal deficits present      Invasive Devices     Peripheral Intravenous Line            Peripheral IV 11/27/18 Right Forearm less than 1 day                      Social History  reviewed  Family History   Problem Relation Age of Onset    Heart block Family     reviewed    Meds:  Current Facility-Administered Medications   Medication Dose Route Frequency Provider Last Rate Last Dose    acetaminophen (TYLENOL) tablet 650 mg  650 mg Oral Q6H PRN Devanshari Javier, PA-C        aspirin tablet 325 mg  325 mg Oral Daily Devan Concepcion, PA-C   325 mg at 11/27/18 7780    calcium carbonate (TUMS) chewable tablet 1,000 mg  1,000 mg Oral Daily PRN Devanshari Javier, PA-C        heparin (porcine) subcutaneous injection 5,000 Units  5,000 Units Subcutaneous [facility] Concepcion, PA-C   5,000 Units at 11/27/18 8223    insulin lispro (HumaLOG) 100 units/mL subcutaneous injection 1-5 Units  1-5 Units Subcutaneous TID AC DUNG Jessica        insulin lispro (HumaLOG) 100 units/mL subcutaneous injection 1-5 Units  1-5 Units Subcutaneous HS DUNG Jessica        losartan (COZAAR) tablet 100 mg  100 mg Oral Daily Devan Concepcion, PA-C   100 mg at 11/27/18 0834    metoprolol succinate (TOPROL-XL) 24 hr tablet 100 mg  100 mg Oral Daily Devan Concepcion, PA-C   100 mg at 11/27/18 0834    metroNIDAZOLE (FLAGYL) IVPB (premix) 500 mg  500 mg Intravenous Becky Ledezma  mL/hr at 11/27/18 0834 500 mg at 11/27/18 0834    ondansetron (ZOFRAN) injection 4 mg  4 mg Intravenous Q6H PRN Rosary Asters, PA-C        pantoprazole (PROTONIX) EC tablet 40 mg  40 mg Oral Early Morning Rosary Asters, PA-C   40 mg at 11/27/18 2307    pravastatin (PRAVACHOL) tablet 40 mg  40 mg Oral HS Rosary Asters, PA-C   40 mg at 11/26/18 2204    sodium chloride 0 9 % infusion  100 mL/hr Intravenous Continuous Everett Cake, CRNP 100 mL/hr at 11/27/18 0832 100 mL/hr at 11/27/18 8853    vancomycin (VANCOCIN) oral solution 125 mg  125 mg Oral Q6H [facility] Rosary Asters, PA-C   125 mg at 11/27/18 1076      Prescriptions Prior to Admission   Medication    aspirin (HM ASPIRIN) 325 mg tablet    irbesartan (AVAPRO) 300 mg tablet    levofloxacin (LEVAQUIN) 750 mg tablet    metFORMIN (GLUCOPHAGE-XR) 750 mg 24 hr tablet    metoprolol succinate (TOPROL-XL) 100 mg 24 hr tablet    Multiple Vitamins-Minerals (ICAPS PO)    omeprazole (PRILOSEC) 20 mg delayed release capsule    pravastatin (PRAVACHOL) 40 mg tablet    spironolactone (ALDACTONE) 25 mg tablet       Labs:    Results from last 7 days  Lab Units 11/27/18 0437 11/26/18 0449 11/26/18  0008 11/25/18  1744   WBC Thousand/uL 13 79* 17 63*  --  29 23*   HEMOGLOBIN g/dL 11 3* 12 4  --  13 9   HEMATOCRIT % 34 5* 38 1  --  42 4   PLATELETS Thousands/uL 272 351 381 513*   NEUTROS PCT % 73  --   --   --    LYMPHS PCT % 13*  --   --   --    LYMPHO PCT %  --  8*  --  9*   MONOS PCT % 11  --   --   --    MONO PCT %  --  9  --  4   EOS PCT % 1 5  --  0       Results from last 7 days  Lab Units 11/27/18  0437 11/26/18  0449 11/25/18  1744   POTASSIUM mmol/L 4 2 4 3 4 6   CHLORIDE mmol/L 101 99* 93*   CO2 mmol/L 25 26 22   BUN mg/dL 19 38* 40*   CREATININE mg/dL 1 17 1 44* 1 64*   CALCIUM mg/dL 7 6* 8 3 9 3   ALK PHOS U/L  --   --  90   ALT U/L  --   --  24   AST U/L  --   --  20     No results found for: TROPONINI, CKMB, CKTOTAL      Lab Results   Component Value Date    BLOODCX No Growth at 24 hrs  11/25/2018    BLOODCX No Growth at 24 hrs  11/25/2018         Imaging:  No results found for this or any previous visit  Results for orders placed in visit on 11/19/18   XR chest pa & lateral       Labs & Imaging: I have personally reviewed pertinent reports        VTE Pharmacologic Prophylaxis: Heparin  VTE Mechanical Prophylaxis: sequential compression device    Code Status:   Level 1 - Full Code      "This note has been constructed using a voice recognition system"      Zurdo Veliz MD  11/27/2018,9:46 AM

## 2018-11-28 VITALS
HEIGHT: 71 IN | WEIGHT: 188.05 LBS | DIASTOLIC BLOOD PRESSURE: 63 MMHG | BODY MASS INDEX: 26.33 KG/M2 | OXYGEN SATURATION: 96 % | TEMPERATURE: 98.5 F | HEART RATE: 85 BPM | SYSTOLIC BLOOD PRESSURE: 131 MMHG | RESPIRATION RATE: 18 BRPM

## 2018-11-28 PROBLEM — J18.9 PNEUMONIA: Status: RESOLVED | Noted: 2018-05-25 | Resolved: 2018-11-28

## 2018-11-28 PROBLEM — N17.9 AKI (ACUTE KIDNEY INJURY) (HCC): Status: RESOLVED | Noted: 2018-11-25 | Resolved: 2018-11-28

## 2018-11-28 LAB
ANION GAP SERPL CALCULATED.3IONS-SCNC: 12 MMOL/L (ref 4–13)
BASOPHILS # BLD AUTO: 0.01 THOUSANDS/ΜL (ref 0–0.1)
BASOPHILS NFR BLD AUTO: 0 % (ref 0–1)
BUN SERPL-MCNC: 9 MG/DL (ref 5–25)
CALCIUM SERPL-MCNC: 7.3 MG/DL (ref 8.3–10.1)
CHLORIDE SERPL-SCNC: 104 MMOL/L (ref 100–108)
CO2 SERPL-SCNC: 21 MMOL/L (ref 21–32)
CREAT SERPL-MCNC: 0.89 MG/DL (ref 0.6–1.3)
EOSINOPHIL # BLD AUTO: 0.13 THOUSAND/ΜL (ref 0–0.61)
EOSINOPHIL NFR BLD AUTO: 2 % (ref 0–6)
ERYTHROCYTE [DISTWIDTH] IN BLOOD BY AUTOMATED COUNT: 14.7 % (ref 11.6–15.1)
GFR SERPL CREATININE-BSD FRML MDRD: 86 ML/MIN/1.73SQ M
GLUCOSE SERPL-MCNC: 100 MG/DL (ref 65–140)
GLUCOSE SERPL-MCNC: 101 MG/DL (ref 65–140)
GLUCOSE SERPL-MCNC: 92 MG/DL (ref 65–140)
HCT VFR BLD AUTO: 32.2 % (ref 36.5–49.3)
HGB BLD-MCNC: 10.6 G/DL (ref 12–17)
IMM GRANULOCYTES # BLD AUTO: 0.16 THOUSAND/UL (ref 0–0.2)
IMM GRANULOCYTES NFR BLD AUTO: 2 % (ref 0–2)
LYMPHOCYTES # BLD AUTO: 1.45 THOUSANDS/ΜL (ref 0.6–4.47)
LYMPHOCYTES NFR BLD AUTO: 16 % (ref 14–44)
MCH RBC QN AUTO: 28.5 PG (ref 26.8–34.3)
MCHC RBC AUTO-ENTMCNC: 32.9 G/DL (ref 31.4–37.4)
MCV RBC AUTO: 87 FL (ref 82–98)
MONOCYTES # BLD AUTO: 1.06 THOUSAND/ΜL (ref 0.17–1.22)
MONOCYTES NFR BLD AUTO: 12 % (ref 4–12)
NEUTROPHILS # BLD AUTO: 6.11 THOUSANDS/ΜL (ref 1.85–7.62)
NEUTS SEG NFR BLD AUTO: 68 % (ref 43–75)
PLATELET # BLD AUTO: 230 THOUSANDS/UL (ref 149–390)
PMV BLD AUTO: 9.5 FL (ref 8.9–12.7)
POTASSIUM SERPL-SCNC: 3.5 MMOL/L (ref 3.5–5.3)
RBC # BLD AUTO: 3.72 MILLION/UL (ref 3.88–5.62)
SODIUM SERPL-SCNC: 137 MMOL/L (ref 136–145)
WBC # BLD AUTO: 8.92 THOUSAND/UL (ref 4.31–10.16)

## 2018-11-28 PROCEDURE — 92526 ORAL FUNCTION THERAPY: CPT

## 2018-11-28 PROCEDURE — 99232 SBSQ HOSP IP/OBS MODERATE 35: CPT | Performed by: INTERNAL MEDICINE

## 2018-11-28 PROCEDURE — 82948 REAGENT STRIP/BLOOD GLUCOSE: CPT

## 2018-11-28 PROCEDURE — 99239 HOSP IP/OBS DSCHRG MGMT >30: CPT | Performed by: INTERNAL MEDICINE

## 2018-11-28 PROCEDURE — 80048 BASIC METABOLIC PNL TOTAL CA: CPT | Performed by: INTERNAL MEDICINE

## 2018-11-28 PROCEDURE — 85025 COMPLETE CBC W/AUTO DIFF WBC: CPT | Performed by: INTERNAL MEDICINE

## 2018-11-28 RX ORDER — SACCHAROMYCES BOULARDII 250 MG
250 CAPSULE ORAL 2 TIMES DAILY
Status: DISCONTINUED | OUTPATIENT
Start: 2018-11-28 | End: 2018-11-28 | Stop reason: HOSPADM

## 2018-11-28 RX ORDER — SACCHAROMYCES BOULARDII 250 MG
250 CAPSULE ORAL 2 TIMES DAILY
Qty: 40 CAPSULE | Refills: 0 | Status: SHIPPED | OUTPATIENT
Start: 2018-11-28 | End: 2018-12-18

## 2018-11-28 RX ADMIN — ASPIRIN 325 MG ORAL TABLET 325 MG: 325 PILL ORAL at 08:04

## 2018-11-28 RX ADMIN — METOPROLOL SUCCINATE 100 MG: 50 TABLET, EXTENDED RELEASE ORAL at 08:04

## 2018-11-28 RX ADMIN — SODIUM CHLORIDE 100 ML/HR: 0.9 INJECTION, SOLUTION INTRAVENOUS at 05:09

## 2018-11-28 RX ADMIN — LOSARTAN POTASSIUM 100 MG: 50 TABLET ORAL at 08:04

## 2018-11-28 RX ADMIN — PANTOPRAZOLE SODIUM 40 MG: 40 TABLET, DELAYED RELEASE ORAL at 05:03

## 2018-11-28 RX ADMIN — HEPARIN SODIUM 5000 UNITS: 5000 INJECTION INTRAVENOUS; SUBCUTANEOUS at 05:03

## 2018-11-28 NOTE — PROGRESS NOTES
Pt observed to be sleeping during most hrly rounds overnight; OOB x2 to void in BR; reports sm amt of stool with each void  Denies pain; swallowed am pill crushed in applesauce without difficulty

## 2018-11-28 NOTE — DISCHARGE INSTRUCTIONS
Follow-up with PCP in 1 week  Follow-up with Gastroenterology for outpatient colonoscopy in 4-6 weeks  Outpatient speech therapy follow-up  Return to ER with any worsening fever, chills, abdominal pain, cough, shortness of breath or any other alarming symptoms

## 2018-11-28 NOTE — PROGRESS NOTES
Pulmonary Progress Note  Huan Gauthier 71 y o  male MRN: [id number]  Unit/Bed#: [address] [place] [address] Encounter: [id number]      Assesment and Recommendations:    1  Abnormal CT chest with bilateral infiltrates consistent with recent community-acquired pneumonia status post 14 days of antibiotics including doxycycline, ceftaroline and Levaquin - clinically the patient has significantly improved  · Outpatient chest x-ray 4-6 weeks with follow-up with his primary care physician  If there are any further pulmonary concerns they can call my office for outpatient pulmonary follow-up         2  Recurrent pneumonia - possible aspiration  · Appreciate speech therapy input, patient will follow dysphagia diet     3  Vomiting/diarrhea -negative C diff-off antibiotics per GI    He is stable for discharge home from a pulmonary standpoint  Will sign off, please call with any questions    Chief Complaint:  Doing much better    Subjective: The patient states that he feels much better  His cough is significantly improved  He has been ambulating in the hallway without shortness of breath, cough, wheezing or chest pain  He still has some diarrhea  He is tolerating a p  O  Diet without significant issues  Vitals: Blood pressure 131/63, pulse 85, temperature 98 5 °F (36 9 °C), temperature source Oral, resp  rate 18, height 5' 11" (1 803 m), weight 85 3 kg (188 lb 0 8 oz), SpO2 96 % , room air, Body mass index is 26 23 kg/m²        Intake/Output Summary (Last 24 hours) at 11/28/18 8912  Last data filed at 11/28/18 0543   Gross per 24 hour   Intake          2396 67 ml   Output                0 ml   Net          2396 67 ml       Physical exam:  General:  Patient is awake, alert, non-toxic and in no acute respiratory distress  Neck: No JVD  CV:  Regular, +S1 and S2, No murmurs, gallops or rubs appreciated  Lungs: Clear to auscultation bilateral without wheeze, rales or rhonci  Abdomen: Soft, +BS, Non-tender, non-distended  Extremities: No clubbing, cyanosis or edema  Neuro: No focal deficits    Labs:   CBC:   Lab Results   Component Value Date    WBC 8 92 11/28/2018    HGB 10 6 (L) 11/28/2018    HCT 32 2 (L) 11/28/2018    MCV 87 11/28/2018     11/28/2018    MCH 28 5 11/28/2018    MCHC 32 9 11/28/2018    RDW 14 7 11/28/2018    MPV 9 5 11/28/2018   , CMP:   Lab Results   Component Value Date    SODIUM 137 11/28/2018    K 3 5 11/28/2018     11/28/2018    CO2 21 11/28/2018    BUN 9 11/28/2018    CREATININE 0 89 11/28/2018    CALCIUM 7 3 (L) 11/28/2018    EGFR 86 11/28/2018       Lenord Claude, DO      Portions of the record may have been created with voice recognition software  Occasional wrong word or "sound a like" substitutions may have occurred due to the inherent limitations of voice recognition software  Read the chart carefully and recognize, using context, where substitutions have occurred

## 2018-11-28 NOTE — PROGRESS NOTES
Herminia Face  [id number]    71 y o   male      ASSESSMENT  1  Diarrhea with mild colitis noted on CT scan of the abdomen/pelvis at sigmoid colon   Increased risk for C diff with recent antibiotic exposure, but stool negative  2  Acute onset of nausea,  vomiting and diarrhea   Resolved, likely gastroenteritis  3  Pneumonia, recurrent  he has been on 2 antibiotics recently for this, followed by Pulmonary  4  History of right colon carcinoma in 2013 status post resection   In 2014 repeat colonoscopy demonstrated sigmoid colon and rectum with MALT, representing a low grade lymphoma   Status post chemo and radiation  5  Dysphagia, to solids and liquids  -improving        PLAN  Wife at bedside  Tolerating diet  Stools are less frequent/more formed  Stable for discharge home on a daily probiotic  Okay to use Imodium as needed  He will contact me if symptoms persist/worsen  Will arrange an outpatient colonoscopy in 4-6 weeks  Discussed with Internal Medicine    Chief Complaint   Patient presents with    Vomiting     patient presents to the ER stating he was diagnosed with pneumonia this week and put on another antibiotic, started with vomiting and diarrhea this morning, states is dehydrated   Diarrhea       SUBJECTIVE/HPI   Tolerating diet without nausea or vomiting    Stool less frequent, more formed    /63 (BP Location: Left arm)   Pulse 85   Temp 98 5 °F (36 9 °C) (Oral)   Resp 18   Ht 5' 11" (1 803 m)   Wt 85 3 kg (188 lb 0 8 oz)   SpO2 96%   BMI 26 23 kg/m²     PHYSICALEXAM  General appearance: alert, appears stated age and cooperative  Head: Normocephalic, without obvious abnormality, atraumatic  Lungs: clear to auscultation bilaterally  Heart: regular rate and rhythm, S1, S2 normal, no murmur, click, rub or gallop  Abdomen: soft, non-tender; bowel sounds normal; no masses,  no organomegaly  Extremities: extremities normal, atraumatic, no cyanosis or edema  Neurologic: Grossly normal    Lab Results   Component Value Date    GLUCOSE 204 (H) 10/13/2017    CALCIUM 7 3 (L) 11/28/2018     05/19/2017    K 3 5 11/28/2018    CO2 21 11/28/2018     11/28/2018    BUN 9 11/28/2018    CREATININE 0 89 11/28/2018     Lab Results   Component Value Date    WBC 8 92 11/28/2018    HGB 10 6 (L) 11/28/2018    HCT 32 2 (L) 11/28/2018    MCV 87 11/28/2018     11/28/2018     Lab Results   Component Value Date    ALT 24 11/25/2018    AST 20 11/25/2018    ALKPHOS 90 11/25/2018    BILITOT 0 9 05/19/2017     No results found for: AMYLASE  Lab Results   Component Value Date    LIPASE 173 11/25/2018     No results found for: IRON, TIBC, FERRITIN  No results found for: INR

## 2018-11-28 NOTE — DISCHARGE SUMMARY
Discharge Summary - Navjot Bajwa 71 y o  male MRN: [id number]  Unit/Bed#: [address] [place] [address] Encounter: [id number]    Admission Date:    11/25/2018   Discharge Date:   11/28/18   Admitting Diagnosis:   Colitis [K52 9]  Vomiting [R11 10]  Pneumonia [J18 9]  Essential hypertension [I10]  EROS (acute kidney injury) ([facility] [address] ) [N17 9]  Leukocytosis, unspecified type [D72 829]  Type 2 diabetes mellitus without complication, without long-term current use of insulin (HCC) [E11 9]  Diarrhea, unspecified type [R19 7]  Nausea and vomiting, intractability of vomiting not specified, unspecified vomiting type [R11 2]  Admitting Provider:   Yogi Rico MD  Discharge Provider:   Sushma Poole MD     Primary Care Physician at Discharge:   Jono Reddy PE,[phone]    HPI:   16-year-old male who presented to emergency department with vomiting and diarrhea  Patient also had associated nausea and abdominal pain  Patient was diagnosed with pneumonia approximately a week ago and has been on multiple antibiotics recently  For a detailed HPI please refer to the admission note  Procedures Performed:   No orders of the defined types were placed in this encounter  Hospital Course:   Patient was admitted with possible pneumonia/colitis and acute kidney injury  Patient was started on Levaquin, IV fluids and oral vancomycin  Stool for C diff was sent which came back negative  Patient was seen by gastroenterologist who recommended checking stool for C diff and eventual colonoscopy  Patient was seen by pulmonologist who recommended that complete 1 more day of Levaquin to complete a 7 day course  Continue on oral vancomycin  Repeat chest x-ray in 4-6 weeks  Patient was seen by speech and underwent video barium swallow  Speech recommended mechanical soft with thin liquids and medications to be crushed in puree  Patient was found to have moderate malnutrition and was seen by dietitian and their recommendation were noted  Patient's stool for C diff came back negative  Procalcitonin was normal   Cultures negative to date  Patient's antibiotics were all discontinued including oral vancomycin and Flagyl  Has been tolerating diet  Patient was started on Florastor  Patient was cleared by GI and pulmonology for discharge  Patient was seen by Physical therapy and  and refused any home care services  Follow-up with PCP in 1 week  Follow-up with Gastroenterology for outpatient colonoscopy in 4-6 weeks  Outpatient speech therapy follow-up  Return to ER with any worsening fever, chills, abdominal pain, cough, shortness of breath or any other alarming symptoms  Consulting Providers   Pulmonology and Gastroenterology  Complications:  None  Labs:   Lab Results   Component Value Date    WBC 8 92 11/28/2018    WBC 6 7 05/19/2017    RBC 3 72 (L) 11/28/2018    RBC 5 01 02/22/2014    HGB 10 6 (L) 11/28/2018    HGB 14 5 05/19/2017    HCT 32 2 (L) 11/28/2018    HCT 42 9 05/19/2017    MCV 87 11/28/2018    MCV 85 05/19/2017    MCH 28 5 11/28/2018    MCH 28 7 05/19/2017    RDW 14 7 11/28/2018    RDW 14 6 05/19/2017     11/28/2018     05/19/2017     Lab Results   Component Value Date    CREATININE 0 89 11/28/2018    CREATININE 0 93 05/19/2017    BUN 9 11/28/2018    BUN 20 05/18/2018     05/19/2017    K 3 5 11/28/2018    K 4 2 05/18/2018     11/28/2018    CL 98 05/18/2018    CO2 21 11/28/2018    CO2 26 05/18/2018    GLUCOSE 204 (H) 10/13/2017    PROT 6 5 05/19/2017    ALKPHOS 90 11/25/2018    ALKPHOS 97 05/19/2017    ALT 24 11/25/2018    ALT 44 05/19/2017    AST 20 11/25/2018    AST 48 (H) 05/19/2017       Treatments:  Aspirin, Cozaar, Toprol-XL, Protonix, heparin subcu and Pravachol      Discharge Diagnosis:   Principal Problem:    Pneumonia  Active Problems:    Type 2 diabetes mellitus without complication, without long-term current use of insulin (HCC)    EROS (acute kidney injury) ([facility] [address] )    Colitis Moderate protein-calorie malnutrition ([facility] [address] )  Resolved Problems:    * No resolved hospital problems  *      Condition at Discharge:   Good     Code Status: Level 1 - Full Code  Advance Directive and Living Will: <no information>  Power of :    POLST:      Discharge instructions/Information to patient and family:   See after visit summary for information provided to patient and family  Provisions for Follow-Up Care:  See after visit summary for information related to follow-up care and any pertinent home health orders  Disposition:   Home    Planned Readmission:   No    Discharge Statement   I spent 35 minutes discharging the patient  This time was spent on the day of discharge  I had direct contact with the patient on the day of discharge  Greater than 50% of the total time was spent examining patient, answering all patient questions, arranging and discussing plan of care with patient as well as directly providing post-discharge instructions  Additional time then spent on discharge activities  Discharge Medications:  See after visit summary for reconciled discharge medications provided to patient and family        "This note has been constructed using a voice recognition system"    Faviola Wheat MD  11/28/2018,12:10 PM

## 2018-11-28 NOTE — SPEECH THERAPY NOTE
Speech Language/Pathology    Speech/Language Pathology Progress Note    Patient Name: Leeroy Best  Today's Date: 11/28/2018     Problem List  Patient Active Problem List   Diagnosis    Basal cell carcinoma of skin    Coronary disease    Type 2 diabetes mellitus without complication, without long-term current use of insulin (HCC)    Dyslipidemia    Elevation of level of transaminase or lactic acid dehydrogenase (LDH)    Erectile dysfunction    Esophageal reflux    Fatty liver    Malignant lymphoma (HCC)    Malignant tumor of cecum (HCC)    Primary osteoarthritis of both knees    Multiple myeloma not having achieved remission ([address] )    Pneumonia    Primary localized osteoarthritis of right knee    Primary localized osteoarthritis of left knee    Atrial fibrillation ([address] )    Hx of CABG    EROS (acute kidney injury) ([address] )    Colitis    Essential hypertension    Moderate protein-calorie malnutrition ([facility] [address] )        Past Medical History  Past Medical History:   Diagnosis Date    Atrial fibrillation ([address] )     CAD (coronary artery disease)     Colon cancer ([facility] [address] )     Diabetes mellitus ([facility] [address] )     Fatty liver     GERD (gastroesophageal reflux disease)     Hyperlipidemia     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HCC)     Osteoarthritis of both knees     Pneumonia         Past Surgical History  Past Surgical History:   Procedure Laterality Date    AORTIC VALVE REPLACEMENT      COLECTOMY      COLONOSCOPY      DENTAL SURGERY      KNEE SURGERY      LYMPHADENECTOMY      OTHER SURGICAL HISTORY      MAZE PROCEDURE         Subjective:  "I had breakfast without a problem today"  Objective:  Patient AAO3  In bed  Patient denies any difficulty swallowing breakfast this morning  States he ate scrambled eggs and sausage without difficulty  Patient given PO trials of mechanical soft and thins  Pharyngeal phase; there is audible stasis to cervical auscultation  Clears with liquid wash  No coughing or choking    No vocal or respiratory wetness  Reviewed results of videoswallow with patient as well as importance of alternating liquids to promote pharygneal clearance  Assessment:  Patient tolerating current diet without s/s of aspiration  Provided education with patient regarding results of videoswallow and importance of alternating consistencies  Patient demonstrated and verbalized understanding      Plan/Recommendations:  Continue current diet of mechanical soft and thins  Outpatient speech therapy post d/c (prescription will be needed with discharge)

## 2018-11-29 ENCOUNTER — TRANSITIONAL CARE MANAGEMENT (OUTPATIENT)
Dept: FAMILY MEDICINE CLINIC | Facility: HOSPITAL | Age: 73
End: 2018-11-29

## 2018-11-29 NOTE — UTILIZATION REVIEW
Approved   Authorization #: [id number]      Notification of Discharge  This is a Notification of Discharge from our facility [facility]  Please be advised that this patient has been discharge from our facility  Below you will find the admission and discharge date and time including the patients disposition  PRESENTATION DATE: 11/25/2018  5:13 PM  IP ADMISSION DATE: 11/25/18 1958  DISCHARGE DATE: 11/28/2018  3:06 PM  DISPOSITION: [id number] Utilization Review Department  Phone: [phone]; Fax [phone]  ATTENTION: Please call with any questions or concerns to [phone]  and carefully listen to the prompts so that you are directed to the right person  Send all requests for admission clinical reviews, approved or denied determinations and any other requests to fax [phone]   All voicemails are confidential

## 2018-11-30 ENCOUNTER — OFFICE VISIT (OUTPATIENT)
Dept: OBGYN CLINIC | Facility: CLINIC | Age: 73
End: 2018-11-30
Payer: COMMERCIAL

## 2018-11-30 VITALS
DIASTOLIC BLOOD PRESSURE: 62 MMHG | HEIGHT: 71 IN | SYSTOLIC BLOOD PRESSURE: 122 MMHG | WEIGHT: 182 LBS | HEART RATE: 82 BPM | BODY MASS INDEX: 25.48 KG/M2

## 2018-11-30 DIAGNOSIS — M17.12 PRIMARY LOCALIZED OSTEOARTHRITIS OF LEFT KNEE: ICD-10-CM

## 2018-11-30 DIAGNOSIS — M17.11 PRIMARY LOCALIZED OSTEOARTHRITIS OF RIGHT KNEE: Primary | ICD-10-CM

## 2018-11-30 LAB
BACTERIA BLD CULT: NORMAL
BACTERIA BLD CULT: NORMAL

## 2018-11-30 PROCEDURE — 1111F DSCHRG MED/CURRENT MED MERGE: CPT | Performed by: ORTHOPAEDIC SURGERY

## 2018-11-30 PROCEDURE — 99213 OFFICE O/P EST LOW 20 MIN: CPT | Performed by: ORTHOPAEDIC SURGERY

## 2018-11-30 PROCEDURE — 4040F PNEUMOC VAC/ADMIN/RCVD: CPT | Performed by: ORTHOPAEDIC SURGERY

## 2018-11-30 NOTE — PROGRESS NOTES
Assessment:     1  Primary localized osteoarthritis of right knee    2  Primary localized osteoarthritis of left knee        Plan:     Problem List Items Addressed This Visit        Musculoskeletal and Integument    Primary localized osteoarthritis of right knee - Primary    Primary localized osteoarthritis of left knee          Findings consistent with bilateral knee osteoarthritis  Discussed findings and treatment options with the patient  Patient wants to proceed with knee replacement in January 2019  Will patient return early January to have the surgery scheduled  All patient's questions were answered to his satisfaction  This note is created using dictation transcription  It may contain typographical errors, grammatical errors, improperly dictated words, background noise and other errors  Subjective:     Patient ID: Vishnu Hawkins is a 71 y o  male  Chief Complaint: This is a 67-year-old white male following up for bilateral knee osteoarthritis  Patient is 3 months status post Visco supplement injection  Patient has been doing well since the injection other than having pneumonia that need to be hospitalized  He does complain of increased pain in both knees with prolonged walking  He has a trip planned it for next year to go to [facility] and he is afraid the knee may act up at that time  Patient is considering knee replacement in January to get ready for the trip  He does not want to continue conservative treatment since he knows the arthritis will most likely continue to give him problems        Allergy:  Allergies   Allergen Reactions    Ceftin [Cefuroxime] Itching     Medications:  all current active meds have been reviewed  Past Medical History:  Past Medical History:   Diagnosis Date    [facility]     CAD (coronary artery disease)     [facility]     Diabetes mellitus ([facility] )     Fatty liver     GERD (gastroesophageal reflux disease)     Hyperlipidemia     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[facility] [address] )     Osteoarthritis of both knees     Pneumonia      Past Surgical History:  Past Surgical History:   Procedure Laterality Date    AORTIC VALVE REPLACEMENT      COLECTOMY      COLONOSCOPY      DENTAL SURGERY      KNEE SURGERY      LYMPHADENECTOMY      OTHER SURGICAL HISTORY      MAZE PROCEDURE     Family History:  Family History   Problem Relation Age of Onset    Heart block Family      Social History:  History   Alcohol Use    Yes     History   Drug Use No     History   Smoking Status    Former Smoker    Packs/day: 1 00    Years: 40 00    Types: Cigarettes    Quit date: 2009   Smokeless Tobacco    Never Used     Review of Systems   Constitutional: Negative  HENT: Negative  Eyes: Negative  Respiratory: Negative  Cardiovascular: Negative  Gastrointestinal: Negative  Endocrine: Negative  Genitourinary: Negative  Musculoskeletal: Positive for arthralgias (bilateral knee)  Skin: Negative  Allergic/Immunologic: Negative  Neurological: Negative  Hematological: Negative  Psychiatric/Behavioral: Negative  Objective:  BP Readings from Last 1 Encounters:   11/30/18 122/62      Wt Readings from Last 1 Encounters:   11/30/18 82 6 kg (182 lb)      BMI:   Estimated body mass index is 25 38 kg/m² as calculated from the following:    Height as of this encounter: 5' 11" (1 803 m)  Weight as of this encounter: 82 6 kg (182 lb)  BSA:   Estimated body surface area is 2 03 meters squared as calculated from the following:    Height as of this encounter: 5' 11" (1 803 m)  Weight as of this encounter: 82 6 kg (182 lb)  Physical Exam   Constitutional: He is oriented to person, place, and time  He appears well-developed  HENT:   Head: Normocephalic and atraumatic  Eyes: Conjunctivae and EOM are normal    Neck: Neck supple  Pulmonary/Chest: Effort normal    Musculoskeletal:        Right knee: He exhibits no effusion          Left knee: He exhibits no effusion  Neurological: He is alert and oriented to person, place, and time  Skin: Skin is warm  Psychiatric: He has a normal mood and affect  Nursing note and vitals reviewed  Right Knee Exam     Tenderness   The patient is experiencing no tenderness  Range of Motion   The patient has normal right knee ROM  Muscle Strength     The patient has normal right knee strength  Tests   Varus: negative  Valgus: negative    Other   Erythema: absent  Sensation: normal  Pulse: present  Swelling: none  Other tests: no effusion present    Comments:  Genu varum  Joint hypertrophy  Patellofemoral crepitation      Left Knee Exam     Tenderness   The patient is experiencing no tenderness  Range of Motion   The patient has normal left knee ROM  Muscle Strength     The patient has normal left knee strength      Tests   Varus: negative  Valgus: negative    Other   Erythema: absent  Sensation: normal  Pulse: present  Swelling: none  Effusion: no effusion present    Comments:  Genu varum  Joint hypertrophy  Patellofemoral crepitation              Procedures

## 2018-12-10 ENCOUNTER — OFFICE VISIT (OUTPATIENT)
Dept: FAMILY MEDICINE CLINIC | Facility: HOSPITAL | Age: 73
End: 2018-12-10
Payer: COMMERCIAL

## 2018-12-10 VITALS
TEMPERATURE: 97.9 F | BODY MASS INDEX: 25.2 KG/M2 | SYSTOLIC BLOOD PRESSURE: 120 MMHG | WEIGHT: 180 LBS | HEIGHT: 71 IN | HEART RATE: 88 BPM | DIASTOLIC BLOOD PRESSURE: 68 MMHG

## 2018-12-10 DIAGNOSIS — K52.9 COLITIS: ICD-10-CM

## 2018-12-10 DIAGNOSIS — E11.9 TYPE 2 DIABETES MELLITUS WITHOUT COMPLICATION, WITHOUT LONG-TERM CURRENT USE OF INSULIN (HCC): ICD-10-CM

## 2018-12-10 DIAGNOSIS — Z09 HOSPITAL DISCHARGE FOLLOW-UP: Primary | ICD-10-CM

## 2018-12-10 DIAGNOSIS — J18.9 PNEUMONIA DUE TO INFECTIOUS ORGANISM, UNSPECIFIED LATERALITY, UNSPECIFIED PART OF LUNG: ICD-10-CM

## 2018-12-10 DIAGNOSIS — E44.0 MODERATE PROTEIN-CALORIE MALNUTRITION (HCC): ICD-10-CM

## 2018-12-10 DIAGNOSIS — N17.9 AKI (ACUTE KIDNEY INJURY) (HCC): ICD-10-CM

## 2018-12-10 PROCEDURE — 99495 TRANSJ CARE MGMT MOD F2F 14D: CPT | Performed by: INTERNAL MEDICINE

## 2018-12-10 PROCEDURE — 1160F RVW MEDS BY RX/DR IN RCRD: CPT | Performed by: INTERNAL MEDICINE

## 2018-12-10 RX ORDER — METOPROLOL SUCCINATE 100 MG/1
100 TABLET, EXTENDED RELEASE ORAL DAILY
COMMUNITY
Start: 2018-10-18 | End: 2019-11-01 | Stop reason: SDUPTHER

## 2018-12-10 NOTE — PROGRESS NOTES
Assessment/Plan:    Type 2 diabetes mellitus without complication, without long-term current use of insulin ([facility])  Lab Results   Component Value Date    HGBA1C 8 1 (H) 11/26/2018       No results for input(s): POCGLU in the last 72 hours  Blood Sugar Average: Last 72 hrs:does not check sugars at home  DM Type 2 without complications - uncontrolled with A1C up to 8 1 - pt reporting low readings near end of hospital stay - will con't current Metformin, encouraged healthy low carb diet and keep active, wgt down 9 lbs from Oct 2018 - recheck FBS/A1C in Feb-March and re-eval, UTD on eye exam (4/18) and foot exam done today, on statin/ARB/ASA         Diagnoses and all orders for this visit:    Hospital discharge follow-up    Pneumonia due to infectious organism, unspecified laterality, unspecified part of lung  Comments:  Finished abx, cough improving, no F/C/SOB, has f/u CXR order, call with relapse in symptoms    Colitis  Comments:  Cdiff neg, stools back to normal, rare nausea, eating and drinking well, call with relapse in symptoms    EROS (acute kidney injury) ([facility] [address] )  Comments:  BUN/Cr back to normal by discharge, encouraged to keep hydrated    Moderate protein-calorie malnutrition ([facility] [address] )  Comments:  Urged well balanced diet and keep hydrated    Type 2 diabetes mellitus without complication, without long-term current use of insulin ([facility] [address] )    Other orders  -     metoprolol succinate (TOPROL-XL) 100 mg 24 hr tablet; Take 1 tablet(s) every day by oral route for 90 days  [place] 3/1215 - need to discuss f/u with pt at next appt    Pt UTD on flu and pneumonia series    Subjective:      Patient ID: Katina Cuellar is a 71 y o  male  HPI Pt here for ALBER/Hospital follow up  Pt was admitted to [facility]UL[facility] from 11/25/18 to 11/28/18  Records were reviewed by myself in detail and events are summarized below      TCM Call (since 11/9/2018)     Date and time call was made  11/29/2018  8:37 AM    Hospital care reviewed  Records reviewed    Patient was hospitialized at  [facility]    Date of Admission  11/25/18    Date of discharge  11/28/18    Diagnosis  Pneumonia, Collitis     Disposition  Home    Were the patients medications reviewed and updated  No    Current Symptoms  Loose Stools    Loose stool severity  Moderate      TCM Call (since 11/9/2018)     Post hospital issues  None    Should patient be enrolled in anticoag monitoring? No    Scheduled for follow up? Yes    Did you obtain your prescribed medications  Yes    Do you need help managing your prescriptions or medications  No    Is transportation to your appointment needed  No    I have advised the patient to call PCP with any new or worsening symptoms  Jovani Gr MA    Living Arrangements  Family members    Are you recieving any outpatient services  No    Are you recieving home care services  No    Are you using any community resources  No    Current waiver services  No    Have you fallen in the last 12 months  No    Interperter language line needed  No    Counseling  Patient        Pt presented to [facility] ER on 11/25/18 with c/wo abd pain/D/V for less then 1 day  On exam in the ED his HR was 109, temp 97 5, /48 and O2 sat was 96% on RA  He was noted to have RLL crackles and tenderness to palp at periumbilical region  Labs were significant for Wbc ct of 29 23, plt of 513, Na 131, Glu 306 and BUN/Cr up at 40/1  64  CT C/A/P noted airspace opacities throughout the lung bases and radionuclide airspace opacities within the left upper lobe which may represent infectious etiology and mild thickening of the sigmoid colon which may represent colitis  Pt was admitted and given IVF/Zofran  He was started on PO Vanco/Flagyl and IV Levaquin  GI was consulted and was concerned for Cdiff d/t recent abx use  C diff did come back negative  Pulm was also consulted and recommended VBS to evaluate for poss aspiration pneumonia    VBS was done and noted mild pharyngeal dysphagia with valleculae retention of regular solids  He was recommended to start mechanical soft with thin liquids with crushed meds in pureed  Abx were stopped d/t neg Cdiff and finishing tx for pneumonia  Pt con't to improve clinically  Pt was seen by dietician for low albumin and was noted to have moderate malnutrition  Upon discharge 11/28/18 pts BUN/Cr were back to normal at 9/0 89 and WBC back to normal at 8 92  Med list was reviewed  Pt has been doing better since he was discharged  He is aware of need to repeat [place] but states GI told him to call in [place] when he was feeling better  He admits he has not changed his diet as recommended with the dysphagia  He states swallowing is not a major problems and only notes "every now and then things get stuck"  He notes usually with solids and pills  He is taking the Florastor as directed  He notes a mild residual cough but states it is slowly improving  He notes no F/C  He notes no recent abd pain but did have some nausea yesterday but it resolved and he has not vomiting  He notes the stools are back to normal   Wgt down 9 lbs from oct 2018  He has order for repeat CXR  A1C was done during hospital stay and was up at 8 1  He states he is taking his Metformin every day  He does not check his sugars at home  He states during hospital stay his sugars went down and had some low readings  He notes no numbness/tingling/sores/ulcers  He had eye exam April 2018  He is ARB/Statin/ASA  Review of Systems   Constitutional: Positive for unexpected weight change  Negative for chills, fatigue and fever  HENT: Negative for congestion and sinus pain  Eyes: Negative for pain and visual disturbance  Respiratory: Positive for cough  Negative for chest tightness and shortness of breath  Cardiovascular: Negative for chest pain, palpitations and leg swelling     Gastrointestinal: Negative for abdominal pain, blood in stool, constipation, diarrhea, nausea and vomiting  Endocrine: Negative for polydipsia and polyuria  Genitourinary: Negative for difficulty urinating and dysuria  Musculoskeletal: Negative for arthralgias and myalgias  Skin: Negative for rash and wound  Neurological: Negative for dizziness and headaches  Hematological: Does not bruise/bleed easily  Psychiatric/Behavioral: Negative for behavioral problems and confusion  Objective:    /68   Pulse 88   Temp 97 9 °F (36 6 °C)   Ht 5' 11" (1 803 m)   Wt 81 6 kg (180 lb)   BMI 25 10 kg/m²      Physical Exam   Constitutional: He appears well-developed and well-nourished  No distress  HENT:   Head: Normocephalic and atraumatic  Mouth/Throat: No oropharyngeal exudate  Eyes: Conjunctivae are normal  Right eye exhibits no discharge  Left eye exhibits no discharge  Neck: Neck supple  No tracheal deviation present  Cardiovascular: Normal rate and regular rhythm  Pulses are no weak pulses  No murmur heard  Pulses:       Dorsalis pedis pulses are 2+ on the right side, and 2+ on the left side  Pulmonary/Chest: Effort normal and breath sounds normal  No respiratory distress  He has no wheezes  He has no rales  Abdominal: Soft  He exhibits no distension  There is no tenderness  Musculoskeletal: He exhibits no deformity  Feet:   Right Foot:   Skin Integrity: Positive for dry skin  Negative for ulcer, skin breakdown, erythema, warmth or callus  Left Foot:   Skin Integrity: Positive for dry skin  Negative for ulcer, skin breakdown, erythema, warmth or callus  Lymphadenopathy:     He has no cervical adenopathy  Neurological: He is alert  He exhibits normal muscle tone  Skin: Skin is warm and dry  No rash noted  Psychiatric: He has a normal mood and affect  His behavior is normal    Nursing note and vitals reviewed  Patient's shoes and socks removed  Right Foot/Ankle   Right Foot Inspection  Skin Exam: skin normal, skin intact and dry skin no warmth, no callus, no erythema, no maceration, no abnormal color, no pre-ulcer, no ulcer and no callus                          Toe Exam: ROM and strength within normal limits  Sensory   Vibration: intact    Monofilament testing: intact  Vascular    The right DP pulse is 2+  Left Foot/Ankle  Left Foot Inspection  Skin Exam: skin normal, skin intact and dry skinno warmth, no erythema, no maceration, normal color, no pre-ulcer, no ulcer and no callus                         Toe Exam: ROM and strength within normal limits                   Sensory   Vibration: intact    Monofilament: intact  Vascular    The left DP pulse is 2+  Assign Risk Category:  No deformity present; No loss of protective sensation;  No weak pulses       Risk: 0

## 2018-12-10 NOTE — ASSESSMENT & PLAN NOTE
Lab Results   Component Value Date    HGBA1C 8 1 (H) 11/26/2018       No results for input(s): POCGLU in the last 72 hours      Blood Sugar Average: Last 72 hrs:does not check sugars at home  DM Type 2 without complications - uncontrolled with A1C up to 8 1 - pt reporting low readings near end of hospital stay - will con't current Metformin, encouraged healthy low carb diet and keep active, wgt down 9 lbs from Oct 2018 - recheck FBS/A1C in Feb-March and re-ROSALINA larios on eye exam (4/18) and foot exam done today, on statin/ARB/ASA

## 2018-12-21 ENCOUNTER — HOSPITAL ENCOUNTER (OUTPATIENT)
Dept: RADIOLOGY | Facility: HOSPITAL | Age: 73
Discharge: HOME | End: 2018-12-21
Attending: INTERNAL MEDICINE
Payer: COMMERCIAL

## 2018-12-21 DIAGNOSIS — J18.9 PNEUMONIA: ICD-10-CM

## 2018-12-21 PROCEDURE — 71046 X-RAY EXAM CHEST 2 VIEWS: CPT

## 2019-01-04 ENCOUNTER — TELEPHONE (OUTPATIENT)
Dept: FAMILY MEDICINE CLINIC | Facility: HOSPITAL | Age: 74
End: 2019-01-04

## 2019-01-04 ENCOUNTER — OFFICE VISIT (OUTPATIENT)
Dept: OBGYN CLINIC | Facility: CLINIC | Age: 74
End: 2019-01-04
Payer: COMMERCIAL

## 2019-01-04 VITALS
HEART RATE: 78 BPM | DIASTOLIC BLOOD PRESSURE: 68 MMHG | WEIGHT: 181 LBS | BODY MASS INDEX: 25.34 KG/M2 | HEIGHT: 71 IN | SYSTOLIC BLOOD PRESSURE: 116 MMHG

## 2019-01-04 DIAGNOSIS — M17.12 PRIMARY LOCALIZED OSTEOARTHRITIS OF LEFT KNEE: Primary | ICD-10-CM

## 2019-01-04 DIAGNOSIS — M17.12 PRIMARY OSTEOARTHRITIS OF LEFT KNEE: Primary | ICD-10-CM

## 2019-01-04 PROCEDURE — 86901 BLOOD TYPING SEROLOGIC RH(D): CPT

## 2019-01-04 PROCEDURE — 99214 OFFICE O/P EST MOD 30 MIN: CPT | Performed by: ORTHOPAEDIC SURGERY

## 2019-01-04 RX ORDER — ASCORBIC ACID 500 MG
500 TABLET ORAL 2 TIMES DAILY
Qty: 60 TABLET | Refills: 1 | Status: SHIPPED | OUTPATIENT
Start: 2019-01-04 | End: 2021-04-10 | Stop reason: HOSPADM

## 2019-01-04 RX ORDER — SODIUM CHLORIDE, SODIUM LACTATE, POTASSIUM CHLORIDE, CALCIUM CHLORIDE 600; 310; 30; 20 MG/100ML; MG/100ML; MG/100ML; MG/100ML
50 INJECTION, SOLUTION INTRAVENOUS CONTINUOUS
Status: CANCELLED | OUTPATIENT
Start: 2019-01-28

## 2019-01-04 RX ORDER — FOLIC ACID 1 MG/1
1 TABLET ORAL DAILY
Qty: 30 TABLET | Refills: 1 | Status: SHIPPED | OUTPATIENT
Start: 2019-01-04 | End: 2019-03-11

## 2019-01-04 RX ORDER — CEFAZOLIN SODIUM 2 G/50ML
2000 SOLUTION INTRAVENOUS ONCE
Status: CANCELLED | OUTPATIENT
Start: 2019-01-28 | End: 2019-01-04

## 2019-01-04 RX ORDER — METFORMIN HYDROCHLORIDE 750 MG/1
TABLET, EXTENDED RELEASE ORAL
Refills: 0 | COMMUNITY
Start: 2018-12-20 | End: 2019-01-11

## 2019-01-04 RX ORDER — FERROUS SULFATE TAB EC 324 MG (65 MG FE EQUIVALENT) 324 (65 FE) MG
324 TABLET DELAYED RESPONSE ORAL
Qty: 60 TABLET | Refills: 1 | Status: SHIPPED | OUTPATIENT
Start: 2019-01-04 | End: 2019-03-29 | Stop reason: ALTCHOICE

## 2019-01-04 NOTE — PROGRESS NOTES
Assessment:     1  Primary localized osteoarthritis of left knee        Plan:  The patient was seen and examined by Dr Angela Carlisle and myself  Problem List Items Addressed This Visit        Musculoskeletal and Integument    Primary localized osteoarthritis of left knee - Primary     Findings consistent with left knee osteoarthritis  Findings and treatment options were discussed with the patient  Patient continues to have significant pain in his left knee despite conservative treatment  Recommend left total knee arthroplasty at this time  Risks, benefits and complications of the surgery were discussed in detail by Dr Angela Carlisle and informed consent was obtained  Patient did have an elevated hemoglobin A1c when last drawn in November 2018  It has since been addressed by his PCP last month  We will redraw the hemoglobin A1c, and explained to patient that if it is elevated again we may have to postpone surgery until his diabetes is better controlled  All questions were answered to patient's satisfaction  Relevant Medications    ferrous sulfate 324 (65 Fe) mg    folic acid (FOLVITE) 1 mg tablet    Multiple Vitamin (MULTI-VITAMIN) tablet    ascorbic acid (VITAMIN C) 500 mg tablet    Other Relevant Orders    CBC and differential    Ambulatory referral to Internal Medicine    Case request operating room: ARTHROPLASTY LEFT KNEE TOTAL (Completed)              Subjective:     Patient ID: Meghan Marinelli is a 68 y o  male  Chief Complaint: This is a 20-year-old white male following up for bilateral knee osteoarthritis  He continues to have significant pain in his bilateral knees, left worse than right  He did not feel much relief from the viscosupplementation injections given in the past   He would like to proceed with scheduling a left total knee arthroplasty        Allergy:  Allergies   Allergen Reactions    Ceftin [Cefuroxime] Itching     Medications:  all current active meds have been reviewed  Past Medical History:  Past Medical History:   Diagnosis Date    Atrial fibrillation ([address] )     Fatty liver     Pneumonia      Past Surgical History:  Past Surgical History:   Procedure Laterality Date    AORTIC VALVE REPLACEMENT      COLECTOMY      COLONOSCOPY      DENTAL SURGERY      KNEE SURGERY      LYMPHADENECTOMY      OTHER SURGICAL HISTORY      MAZE PROCEDURE     Family History:  Family History   Problem Relation Age of Onset    Heart block Family      Social History:  History   Alcohol Use    Yes     History   Drug Use No     History   Smoking Status    Former Smoker    Packs/day: 1 00    Years: 40 00    Types: Cigarettes    Quit date: 2009   Smokeless Tobacco    Never Used     Review of Systems   Constitutional: Negative  HENT: Negative  Eyes: Negative  Respiratory: Negative  Cardiovascular: Negative  Gastrointestinal: Negative  Endocrine: Negative  Genitourinary: Negative  Musculoskeletal: Positive for arthralgias (bilateral knee)  Skin: Negative  Allergic/Immunologic: Negative  Neurological: Negative  Hematological: Negative  Psychiatric/Behavioral: Negative  Objective:  BP Readings from Last 1 Encounters:   01/04/19 116/68      Wt Readings from Last 1 Encounters:   01/04/19 82 1 kg (181 lb)      BMI:   Estimated body mass index is 25 24 kg/m² as calculated from the following:    Height as of this encounter: 5' 11" (1 803 m)  Weight as of this encounter: 82 1 kg (181 lb)  BSA:   Estimated body surface area is 2 02 meters squared as calculated from the following:    Height as of this encounter: 5' 11" (1 803 m)  Weight as of this encounter: 82 1 kg (181 lb)  Physical Exam   Constitutional: He is oriented to person, place, and time  He appears well-developed  HENT:   Head: Normocephalic and atraumatic  Eyes: Conjunctivae and EOM are normal    Neck: Neck supple  Cardiovascular: Normal rate, regular rhythm and normal heart sounds      No murmur heard   Pulmonary/Chest: Effort normal and breath sounds normal  No respiratory distress  Musculoskeletal:        Right knee: He exhibits no effusion  Left knee: He exhibits no effusion  Neurological: He is alert and oriented to person, place, and time  Skin: Skin is warm  Psychiatric: He has a normal mood and affect  Nursing note and vitals reviewed  Right Knee Exam     Tenderness   The patient is experiencing no tenderness  Range of Motion   The patient has normal right knee ROM  Muscle Strength     The patient has normal right knee strength  Tests   Varus: negative  Valgus: negative    Other   Erythema: absent  Sensation: normal  Pulse: present  Swelling: none  Other tests: no effusion present    Comments:  Genu varum  Joint hypertrophy  Patellofemoral crepitation      Left Knee Exam     Tenderness   The patient is experiencing no tenderness  Range of Motion   The patient has normal left knee ROM  Muscle Strength     The patient has normal left knee strength      Tests   Varus: negative  Valgus: negative    Other   Erythema: absent  Sensation: normal  Pulse: present  Swelling: none  Effusion: no effusion present    Comments:  Genu varum  Joint hypertrophy  Patellofemoral crepitation              Procedures

## 2019-01-04 NOTE — ASSESSMENT & PLAN NOTE
Findings consistent with left knee osteoarthritis  Findings and treatment options were discussed with the patient  Patient continues to have significant pain in his left knee despite conservative treatment  Recommend left total knee arthroplasty at this time  Risks, benefits and complications of the surgery were discussed in detail by Dr Ibrahima Mccarthy and informed consent was obtained  Patient did have an elevated hemoglobin A1c when last drawn in November 2018  It has since been addressed by his PCP last month  We will redraw the hemoglobin A1c, and explained to patient that if it is elevated again we may have to postpone surgery until his diabetes is better controlled  All questions were answered to patient's satisfaction

## 2019-01-07 ENCOUNTER — TELEPHONE (OUTPATIENT)
Dept: OBGYN CLINIC | Facility: CLINIC | Age: 74
End: 2019-01-07

## 2019-01-09 ENCOUNTER — TELEPHONE (OUTPATIENT)
Dept: OBGYN CLINIC | Facility: HOSPITAL | Age: 74
End: 2019-01-09

## 2019-01-09 DIAGNOSIS — E11.9 TYPE 2 DIABETES MELLITUS WITHOUT COMPLICATION, WITHOUT LONG-TERM CURRENT USE OF INSULIN (HCC): ICD-10-CM

## 2019-01-09 LAB
ABO GROUP BLD: NORMAL
ALBUMIN SERPL-MCNC: 4.7 G/DL (ref 3.5–4.8)
ALBUMIN/GLOB SERPL: 2.4 {RATIO} (ref 1.2–2.2)
ALP SERPL-CCNC: 93 IU/L (ref 39–117)
ALT SERPL-CCNC: 20 IU/L (ref 0–44)
APTT PPP: 35 SEC (ref 24–33)
AST SERPL-CCNC: 21 IU/L (ref 0–40)
BASOPHILS # BLD AUTO: 0.1 X10E3/UL (ref 0–0.2)
BASOPHILS NFR BLD AUTO: 1 %
BILIRUB SERPL-MCNC: 0.7 MG/DL (ref 0–1.2)
BLD GP AB SCN SERPL QL: NEGATIVE
BUN SERPL-MCNC: 33 MG/DL (ref 8–27)
BUN/CREAT SERPL: 23 (ref 10–24)
CALCIUM SERPL-MCNC: 9.7 MG/DL (ref 8.6–10.2)
CHLORIDE SERPL-SCNC: 99 MMOL/L (ref 96–106)
CO2 SERPL-SCNC: 22 MMOL/L (ref 20–29)
CREAT SERPL-MCNC: 1.42 MG/DL (ref 0.76–1.27)
EOSINOPHIL # BLD AUTO: 0.6 X10E3/UL (ref 0–0.4)
EOSINOPHIL NFR BLD AUTO: 6 %
ERYTHROCYTE [DISTWIDTH] IN BLOOD BY AUTOMATED COUNT: 15.1 % (ref 12.3–15.4)
EST. AVERAGE GLUCOSE BLD GHB EST-MCNC: 154 MG/DL
GLOBULIN SER-MCNC: 2 G/DL (ref 1.5–4.5)
GLUCOSE SERPL-MCNC: 137 MG/DL (ref 65–99)
HBA1C MFR BLD: 7 % (ref 4.8–5.6)
HCT VFR BLD AUTO: 38.3 % (ref 37.5–51)
HGB BLD-MCNC: 13.1 G/DL (ref 13–17.7)
IMM GRANULOCYTES # BLD: 0 X10E3/UL (ref 0–0.1)
IMM GRANULOCYTES NFR BLD: 0 %
INR PPP: 1 (ref 0.8–1.2)
LYMPHOCYTES # BLD AUTO: 2.3 X10E3/UL (ref 0.7–3.1)
LYMPHOCYTES NFR BLD AUTO: 22 %
MCH RBC QN AUTO: 28.4 PG (ref 26.6–33)
MCHC RBC AUTO-ENTMCNC: 34.2 G/DL (ref 31.5–35.7)
MCV RBC AUTO: 83 FL (ref 79–97)
MONOCYTES # BLD AUTO: 0.8 X10E3/UL (ref 0.1–0.9)
MONOCYTES NFR BLD AUTO: 7 %
NEUTROPHILS # BLD AUTO: 7 X10E3/UL (ref 1.4–7)
NEUTROPHILS NFR BLD AUTO: 64 %
PLATELET # BLD AUTO: 274 X10E3/UL (ref 150–379)
POTASSIUM SERPL-SCNC: 4.4 MMOL/L (ref 3.5–5.2)
PROT SERPL-MCNC: 6.7 G/DL (ref 6–8.5)
PROTHROMBIN TIME: 10.7 SEC (ref 9.1–12)
RBC # BLD AUTO: 4.62 X10E6/UL (ref 4.14–5.8)
RH BLD: POSITIVE
SL AMB EGFR AFRICAN AMERICAN: 57 ML/MIN/1.73
SL AMB EGFR NON AFRICAN AMERICAN: 49 ML/MIN/1.73
SODIUM SERPL-SCNC: 139 MMOL/L (ref 134–144)
WBC # BLD AUTO: 10.8 X10E3/UL (ref 3.4–10.8)

## 2019-01-09 RX ORDER — METFORMIN HYDROCHLORIDE 750 MG/1
750 TABLET, EXTENDED RELEASE ORAL
Qty: 90 TABLET | Refills: 2 | Status: SHIPPED | OUTPATIENT
Start: 2019-01-09 | End: 2019-09-23 | Stop reason: SDUPTHER

## 2019-01-09 NOTE — TELEPHONE ENCOUNTER
[facility], and all of his rx's must be switched to 90 day supplies, he is good for right now, he doesn't need any refills for the other meds accept for his metformin    Please refill metformin to [facility] for 90 days supply

## 2019-01-11 ENCOUNTER — OFFICE VISIT (OUTPATIENT)
Dept: FAMILY MEDICINE CLINIC | Facility: HOSPITAL | Age: 74
End: 2019-01-11
Payer: COMMERCIAL

## 2019-01-11 VITALS
SYSTOLIC BLOOD PRESSURE: 130 MMHG | BODY MASS INDEX: 25.2 KG/M2 | DIASTOLIC BLOOD PRESSURE: 68 MMHG | TEMPERATURE: 98 F | HEIGHT: 71 IN | WEIGHT: 180 LBS | HEART RATE: 83 BPM

## 2019-01-11 DIAGNOSIS — Z01.818 PREOPERATIVE EVALUATION TO RULE OUT SURGICAL CONTRAINDICATION: Primary | ICD-10-CM

## 2019-01-11 DIAGNOSIS — E11.9 TYPE 2 DIABETES MELLITUS WITHOUT COMPLICATION, WITHOUT LONG-TERM CURRENT USE OF INSULIN (HCC): ICD-10-CM

## 2019-01-11 DIAGNOSIS — I10 ESSENTIAL HYPERTENSION: ICD-10-CM

## 2019-01-11 DIAGNOSIS — M17.12 PRIMARY LOCALIZED OSTEOARTHRITIS OF LEFT KNEE: ICD-10-CM

## 2019-01-11 PROBLEM — E44.0 MODERATE PROTEIN-CALORIE MALNUTRITION (HCC): Status: RESOLVED | Noted: 2018-11-27 | Resolved: 2019-01-11

## 2019-01-11 PROCEDURE — 99214 OFFICE O/P EST MOD 30 MIN: CPT | Performed by: INTERNAL MEDICINE

## 2019-01-11 NOTE — ASSESSMENT & PLAN NOTE
Lab Results   Component Value Date    HGBA1C 7 0 (H) 01/08/2019       No results for input(s): POCGLU in the last 72 hours      Blood Sugar Average: Last 72 hrs:  DM type 2 w/o complications - borderline uncontrolled but greatly improved with A1C 7 0 - cont' watching diet and increase activity as able after TKR, do routine BW prior to regular DM f/u appt in March, UTD on foot and eye exam

## 2019-01-17 ENCOUNTER — TRANSCRIBE ORDERS (OUTPATIENT)
Dept: ADMINISTRATIVE | Facility: HOSPITAL | Age: 74
End: 2019-01-17

## 2019-01-17 DIAGNOSIS — Z95.1 POSTSURGICAL AORTOCORONARY BYPASS STATUS: Primary | ICD-10-CM

## 2019-01-18 ENCOUNTER — APPOINTMENT (OUTPATIENT)
Dept: LAB | Facility: HOSPITAL | Age: 74
End: 2019-01-18
Payer: COMMERCIAL

## 2019-01-18 ENCOUNTER — APPOINTMENT (OUTPATIENT)
Dept: PREADMISSION TESTING | Facility: HOSPITAL | Age: 74
End: 2019-01-18
Payer: COMMERCIAL

## 2019-01-18 ENCOUNTER — HOSPITAL ENCOUNTER (OUTPATIENT)
Dept: NON INVASIVE DIAGNOSTICS | Facility: HOSPITAL | Age: 74
Discharge: HOME | End: 2019-01-18
Payer: COMMERCIAL

## 2019-01-18 DIAGNOSIS — Z95.1 POSTSURGICAL AORTOCORONARY BYPASS STATUS: ICD-10-CM

## 2019-01-18 DIAGNOSIS — I65.23 CAROTID STENOSIS, ASYMPTOMATIC, BILATERAL: ICD-10-CM

## 2019-01-18 LAB
BILIRUB UR QL STRIP: NEGATIVE
CLARITY UR: CLEAR
COLOR UR: YELLOW
GLUCOSE UR STRIP-MCNC: NEGATIVE MG/DL
HGB UR QL STRIP.AUTO: NEGATIVE
KETONES UR STRIP-MCNC: NEGATIVE MG/DL
LEUKOCYTE ESTERASE UR QL STRIP: NEGATIVE
NITRITE UR QL STRIP: NEGATIVE
PH UR STRIP.AUTO: 5.5 [PH] (ref 4.5–8)
PROT UR STRIP-MCNC: NEGATIVE MG/DL
SP GR UR STRIP.AUTO: 1.02 (ref 1–1.03)
UROBILINOGEN UR QL STRIP.AUTO: 1 E.U./DL

## 2019-01-18 PROCEDURE — 81003 URINALYSIS AUTO W/O SCOPE: CPT

## 2019-01-18 PROCEDURE — 93880 EXTRACRANIAL BILAT STUDY: CPT | Performed by: SURGERY

## 2019-01-18 PROCEDURE — 93880 EXTRACRANIAL BILAT STUDY: CPT

## 2019-01-18 NOTE — PRE-PROCEDURE INSTRUCTIONS
Pre-Surgery Instructions:   Medication Instructions    ascorbic acid (VITAMIN C) 500 mg tablet Instructed patient per Anesthesia Guidelines   aspirin (HM ASPIRIN) 325 mg tablet Patient was instructed by Physician and understands   ferrous sulfate 324 (65 Fe) mg Patient was instructed by Physician and understands   folic acid (FOLVITE) 1 mg tablet Patient was instructed by Physician and understands   irbesartan (AVAPRO) 300 mg tablet Instructed patient per Anesthesia Guidelines   metFORMIN (GLUCOPHAGE-XR) 750 mg 24 hr tablet Instructed patient per Anesthesia Guidelines   metoprolol succinate (TOPROL-XL) 100 mg 24 hr tablet Instructed patient per Anesthesia Guidelines   omeprazole (PRILOSEC) 20 mg delayed release capsule Instructed patient per Anesthesia Guidelines   pravastatin (PRAVACHOL) 40 mg tablet Instructed patient per Anesthesia Guidelines   spironolactone (ALDACTONE) 25 mg tablet Instructed patient per Anesthesia Guidelines  Before your operation, you play an important role in decreasing your risk for infection by washing with special antiseptic soap  This is an effective way to reduce bacteria on the skin which may help to prevent infections at the surgical site  Please read the following directions in advance  1  In the week before your operation purchase a 4 ounce bottle of antiseptic soap containing chlorhexidine gluconate 4%  Some brand names include: Aplicare, Endure, and Hibiclens  The cost is usually less than $5 00  · For your convenience, the [address] [place] [address] carries the soap  · It may also be available at your doctor's office or pre-admission testing center, and at most retail pharmacies  · If you are allergic or sensitive to soaps containing chlorhexidine gluconate (CHG), please let your doctor know so another antiseptic soap can be suggested  · CHG antiseptic soap is for external use only    2      The day before your operation follow these directions carefully to get ready  · Place clean lines (sheets) on your bed; you should sleep on clean sheets after your evening shower  · Get clean towels and washcloths ready - you need enough for 2 showers  · Set aside clean underwear, pajamas, and clothing to wear after the shower  Reminders:  · DO NOT use any other soap or body rinse on your skin during or after the antiseptic showers  · DO NOT use lotion , powder, deodorant, or perfume/aftershave of any kind on your skin after your antiseptic shower  · DO NOT shave any body parts in the 24 hours/the day before your operation  · DO NOT get the antiseptic soap in your eyes, ears, nose, mouth, or vaginal area  3      You will need to shower the night before AND the morning of your Surgery  Shower 1:  · The evening before your operation, take the fist shower  · First, shampoo your hair with regular shampoo and rinse it completely before you use the anitseptic soap  After washing and rinsing your hair, rinse your body  · Next, use a clean wash cloth to apply the antiseptic soap and wash your body from the neck down to your toes using 1/2 bottle of the antiseptic soap  You will use the other 1/2 bottle for the second shower  · Clean the area where your incision will be; later this area well for about 2 minutes  · If you ar having head or neck surgery, wash areas with the antiseptic soap  · Rinse yourself completely with running water  · Use a clean towel to dry off  · Wear clean underwear and clothing/pajamas  Shower 2:  · The Morning of your operation, take the second shower following the same steps as Shower 1 using the second 1/2 of the bottle of antiseptic soap  · Use clean cloths and towels to was and dry yourself off  · Wear clean underwear and clothing  Preop strength exercises explained and visual aide given for total knee

## 2019-01-28 ENCOUNTER — ANESTHESIA EVENT (OUTPATIENT)
Dept: PERIOP | Facility: HOSPITAL | Age: 74
DRG: 470 | End: 2019-01-28
Payer: COMMERCIAL

## 2019-01-28 ENCOUNTER — HOSPITAL ENCOUNTER (INPATIENT)
Facility: HOSPITAL | Age: 74
LOS: 3 days | Discharge: HOME HEALTH | DRG: 470 | End: 2019-01-31
Attending: ORTHOPAEDIC SURGERY | Admitting: ORTHOPAEDIC SURGERY
Payer: COMMERCIAL

## 2019-01-28 ENCOUNTER — ANESTHESIA (OUTPATIENT)
Dept: PERIOP | Facility: HOSPITAL | Age: 74
DRG: 470 | End: 2019-01-28
Payer: COMMERCIAL

## 2019-01-28 DIAGNOSIS — M17.12 PRIMARY LOCALIZED OSTEOARTHRITIS OF LEFT KNEE: Primary | ICD-10-CM

## 2019-01-28 PROBLEM — Z96.652 S/P TOTAL KNEE ARTHROPLASTY, LEFT: Status: ACTIVE | Noted: 2019-01-28

## 2019-01-28 LAB
ABO GROUP BLD: NORMAL
BLD GP AB SCN SERPL QL: NEGATIVE
GLUCOSE SERPL-MCNC: 112 MG/DL (ref 65–140)
GLUCOSE SERPL-MCNC: 129 MG/DL (ref 65–140)
GLUCOSE SERPL-MCNC: 157 MG/DL (ref 65–140)
GLUCOSE SERPL-MCNC: 178 MG/DL (ref 65–140)
RH BLD: POSITIVE
SPECIMEN EXPIRATION DATE: NORMAL

## 2019-01-28 PROCEDURE — C1776 JOINT DEVICE (IMPLANTABLE): HCPCS | Performed by: ORTHOPAEDIC SURGERY

## 2019-01-28 PROCEDURE — 0SRD0J9 REPLACEMENT OF LEFT KNEE JOINT WITH SYNTHETIC SUBSTITUTE, CEMENTED, OPEN APPROACH: ICD-10-PCS | Performed by: ORTHOPAEDIC SURGERY

## 2019-01-28 PROCEDURE — G8979 MOBILITY GOAL STATUS: HCPCS

## 2019-01-28 PROCEDURE — 99223 1ST HOSP IP/OBS HIGH 75: CPT | Performed by: NURSE PRACTITIONER

## 2019-01-28 PROCEDURE — 97530 THERAPEUTIC ACTIVITIES: CPT

## 2019-01-28 PROCEDURE — 97163 PT EVAL HIGH COMPLEX 45 MIN: CPT

## 2019-01-28 PROCEDURE — C1713 ANCHOR/SCREW BN/BN,TIS/BN: HCPCS | Performed by: ORTHOPAEDIC SURGERY

## 2019-01-28 PROCEDURE — 86900 BLOOD TYPING SEROLOGIC ABO: CPT | Performed by: ORTHOPAEDIC SURGERY

## 2019-01-28 PROCEDURE — 86901 BLOOD TYPING SEROLOGIC RH(D): CPT | Performed by: ORTHOPAEDIC SURGERY

## 2019-01-28 PROCEDURE — 82948 REAGENT STRIP/BLOOD GLUCOSE: CPT

## 2019-01-28 PROCEDURE — 27447 TOTAL KNEE ARTHROPLASTY: CPT | Performed by: ORTHOPAEDIC SURGERY

## 2019-01-28 PROCEDURE — 27447 TOTAL KNEE ARTHROPLASTY: CPT | Performed by: PHYSICIAN ASSISTANT

## 2019-01-28 PROCEDURE — G8978 MOBILITY CURRENT STATUS: HCPCS

## 2019-01-28 PROCEDURE — 86850 RBC ANTIBODY SCREEN: CPT | Performed by: ORTHOPAEDIC SURGERY

## 2019-01-28 DEVICE — ATTUNE KNEE SYSTEM TIBIAL BASE ROTATING PLATFORM SIZE 7 CEMENTED
Type: IMPLANTABLE DEVICE | Site: KNEE | Status: FUNCTIONAL
Brand: ATTUNE

## 2019-01-28 DEVICE — SMARTSET HIGH PERFORMANCE MV MEDIUM VISCOSITY BONE CEMENT 40G
Type: IMPLANTABLE DEVICE | Site: KNEE | Status: FUNCTIONAL
Brand: SMARTSET

## 2019-01-28 DEVICE — ATTUNE PATELLA MEDIALIZED DOME 38MM CEMENTED AOX
Type: IMPLANTABLE DEVICE | Site: KNEE | Status: FUNCTIONAL
Brand: ATTUNE

## 2019-01-28 DEVICE — ATTUNE KNEE SYSTEM FEMORAL POSTERIOR STABILIZED SIZE 6 LEFT CEMENTED
Type: IMPLANTABLE DEVICE | Site: KNEE | Status: FUNCTIONAL
Brand: ATTUNE

## 2019-01-28 DEVICE — ATTUNE KNEE SYSTEM TIBIAL INSERT ROTATING PLATFORM POSTERIOR STABILIZED 6 8MM AOX
Type: IMPLANTABLE DEVICE | Site: KNEE | Status: FUNCTIONAL
Brand: ATTUNE

## 2019-01-28 RX ORDER — CEFAZOLIN SODIUM 2 G/50ML
2000 SOLUTION INTRAVENOUS EVERY 8 HOURS
Status: COMPLETED | OUTPATIENT
Start: 2019-01-28 | End: 2019-01-28

## 2019-01-28 RX ORDER — MAGNESIUM HYDROXIDE 1200 MG/15ML
LIQUID ORAL AS NEEDED
Status: DISCONTINUED | OUTPATIENT
Start: 2019-01-28 | End: 2019-01-28 | Stop reason: HOSPADM

## 2019-01-28 RX ORDER — MAGNESIUM HYDROXIDE/ALUMINUM HYDROXICE/SIMETHICONE 120; 1200; 1200 MG/30ML; MG/30ML; MG/30ML
15 SUSPENSION ORAL EVERY 6 HOURS PRN
Status: DISCONTINUED | OUTPATIENT
Start: 2019-01-28 | End: 2019-01-31 | Stop reason: HOSPADM

## 2019-01-28 RX ORDER — CEFAZOLIN SODIUM 2 G/50ML
2000 SOLUTION INTRAVENOUS ONCE
Status: COMPLETED | OUTPATIENT
Start: 2019-01-28 | End: 2019-01-28

## 2019-01-28 RX ORDER — SODIUM CHLORIDE, SODIUM LACTATE, POTASSIUM CHLORIDE, CALCIUM CHLORIDE 600; 310; 30; 20 MG/100ML; MG/100ML; MG/100ML; MG/100ML
50 INJECTION, SOLUTION INTRAVENOUS CONTINUOUS
Status: DISCONTINUED | OUTPATIENT
Start: 2019-01-28 | End: 2019-01-28

## 2019-01-28 RX ORDER — CHLORHEXIDINE GLUCONATE 0.12 MG/ML
15 RINSE ORAL EVERY 12 HOURS SCHEDULED
Status: DISCONTINUED | OUTPATIENT
Start: 2019-01-28 | End: 2019-01-28 | Stop reason: HOSPADM

## 2019-01-28 RX ORDER — OXYCODONE HYDROCHLORIDE AND ACETAMINOPHEN 5; 325 MG/1; MG/1
1 TABLET ORAL EVERY 4 HOURS PRN
Status: DISCONTINUED | OUTPATIENT
Start: 2019-01-28 | End: 2019-01-31 | Stop reason: HOSPADM

## 2019-01-28 RX ORDER — HYDROMORPHONE HCL/PF 1 MG/ML
0.5 SYRINGE (ML) INJECTION
Status: DISCONTINUED | OUTPATIENT
Start: 2019-01-28 | End: 2019-01-28 | Stop reason: HOSPADM

## 2019-01-28 RX ORDER — CALCIUM CARBONATE 200(500)MG
1000 TABLET,CHEWABLE ORAL DAILY PRN
Status: DISCONTINUED | OUTPATIENT
Start: 2019-01-28 | End: 2019-01-31 | Stop reason: HOSPADM

## 2019-01-28 RX ORDER — ASPIRIN 325 MG
325 TABLET ORAL DAILY
Status: DISCONTINUED | OUTPATIENT
Start: 2019-01-28 | End: 2019-01-31 | Stop reason: HOSPADM

## 2019-01-28 RX ORDER — SIMETHICONE 80 MG
80 TABLET,CHEWABLE ORAL 4 TIMES DAILY PRN
Status: DISCONTINUED | OUTPATIENT
Start: 2019-01-28 | End: 2019-01-31 | Stop reason: HOSPADM

## 2019-01-28 RX ORDER — FENTANYL CITRATE 50 UG/ML
INJECTION, SOLUTION INTRAMUSCULAR; INTRAVENOUS AS NEEDED
Status: DISCONTINUED | OUTPATIENT
Start: 2019-01-28 | End: 2019-01-28 | Stop reason: SURG

## 2019-01-28 RX ORDER — BUPIVACAINE HYDROCHLORIDE 7.5 MG/ML
INJECTION, SOLUTION EPIDURAL; RETROBULBAR AS NEEDED
Status: DISCONTINUED | OUTPATIENT
Start: 2019-01-28 | End: 2019-01-28 | Stop reason: SURG

## 2019-01-28 RX ORDER — PROPOFOL 10 MG/ML
INJECTION, EMULSION INTRAVENOUS CONTINUOUS PRN
Status: DISCONTINUED | OUTPATIENT
Start: 2019-01-28 | End: 2019-01-28 | Stop reason: SURG

## 2019-01-28 RX ORDER — LOSARTAN POTASSIUM 50 MG/1
100 TABLET ORAL DAILY
Status: DISCONTINUED | OUTPATIENT
Start: 2019-01-28 | End: 2019-01-31 | Stop reason: HOSPADM

## 2019-01-28 RX ORDER — BISACODYL 10 MG
10 SUPPOSITORY, RECTAL RECTAL DAILY PRN
Status: DISCONTINUED | OUTPATIENT
Start: 2019-01-28 | End: 2019-01-31 | Stop reason: HOSPADM

## 2019-01-28 RX ORDER — OXYCODONE HYDROCHLORIDE AND ACETAMINOPHEN 5; 325 MG/1; MG/1
2 TABLET ORAL EVERY 4 HOURS PRN
Status: DISCONTINUED | OUTPATIENT
Start: 2019-01-28 | End: 2019-01-31 | Stop reason: HOSPADM

## 2019-01-28 RX ORDER — ONDANSETRON 2 MG/ML
4 INJECTION INTRAMUSCULAR; INTRAVENOUS EVERY 4 HOURS PRN
Status: DISCONTINUED | OUTPATIENT
Start: 2019-01-28 | End: 2019-01-31 | Stop reason: HOSPADM

## 2019-01-28 RX ORDER — FERROUS SULFATE 325(65) MG
325 TABLET ORAL 2 TIMES DAILY WITH MEALS
Status: DISCONTINUED | OUTPATIENT
Start: 2019-01-28 | End: 2019-01-31 | Stop reason: HOSPADM

## 2019-01-28 RX ORDER — ACETAMINOPHEN 325 MG/1
650 TABLET ORAL EVERY 6 HOURS PRN
Status: DISCONTINUED | OUTPATIENT
Start: 2019-01-28 | End: 2019-01-31 | Stop reason: HOSPADM

## 2019-01-28 RX ORDER — FOLIC ACID 1 MG/1
1 TABLET ORAL DAILY
Status: DISCONTINUED | OUTPATIENT
Start: 2019-01-28 | End: 2019-01-31 | Stop reason: HOSPADM

## 2019-01-28 RX ORDER — METOPROLOL SUCCINATE 50 MG/1
100 TABLET, EXTENDED RELEASE ORAL DAILY
Status: DISCONTINUED | OUTPATIENT
Start: 2019-01-29 | End: 2019-01-31 | Stop reason: HOSPADM

## 2019-01-28 RX ORDER — MIDAZOLAM HYDROCHLORIDE 1 MG/ML
INJECTION INTRAMUSCULAR; INTRAVENOUS AS NEEDED
Status: DISCONTINUED | OUTPATIENT
Start: 2019-01-28 | End: 2019-01-28 | Stop reason: SURG

## 2019-01-28 RX ORDER — PANTOPRAZOLE SODIUM 40 MG/1
40 TABLET, DELAYED RELEASE ORAL
Status: DISCONTINUED | OUTPATIENT
Start: 2019-01-29 | End: 2019-01-31 | Stop reason: HOSPADM

## 2019-01-28 RX ORDER — FENTANYL CITRATE/PF 50 MCG/ML
25 SYRINGE (ML) INJECTION
Status: DISCONTINUED | OUTPATIENT
Start: 2019-01-28 | End: 2019-01-28 | Stop reason: HOSPADM

## 2019-01-28 RX ORDER — PRAVASTATIN SODIUM 40 MG
40 TABLET ORAL
Status: DISCONTINUED | OUTPATIENT
Start: 2019-01-28 | End: 2019-01-31 | Stop reason: HOSPADM

## 2019-01-28 RX ORDER — ROPIVACAINE HYDROCHLORIDE 5 MG/ML
INJECTION, SOLUTION EPIDURAL; INFILTRATION; PERINEURAL AS NEEDED
Status: DISCONTINUED | OUTPATIENT
Start: 2019-01-28 | End: 2019-01-28 | Stop reason: SURG

## 2019-01-28 RX ORDER — EPHEDRINE SULFATE 50 MG/ML
INJECTION, SOLUTION INTRAVENOUS AS NEEDED
Status: DISCONTINUED | OUTPATIENT
Start: 2019-01-28 | End: 2019-01-28 | Stop reason: SURG

## 2019-01-28 RX ORDER — SODIUM CHLORIDE, SODIUM LACTATE, POTASSIUM CHLORIDE, CALCIUM CHLORIDE 600; 310; 30; 20 MG/100ML; MG/100ML; MG/100ML; MG/100ML
75 INJECTION, SOLUTION INTRAVENOUS CONTINUOUS
Status: DISCONTINUED | OUTPATIENT
Start: 2019-01-28 | End: 2019-01-30

## 2019-01-28 RX ORDER — METFORMIN HYDROCHLORIDE 750 MG/1
750 TABLET, EXTENDED RELEASE ORAL
Status: DISCONTINUED | OUTPATIENT
Start: 2019-01-28 | End: 2019-01-28

## 2019-01-28 RX ORDER — DOCUSATE SODIUM 100 MG/1
100 CAPSULE, LIQUID FILLED ORAL 2 TIMES DAILY
Status: DISCONTINUED | OUTPATIENT
Start: 2019-01-28 | End: 2019-01-31 | Stop reason: HOSPADM

## 2019-01-28 RX ORDER — PROPOFOL 10 MG/ML
INJECTION, EMULSION INTRAVENOUS AS NEEDED
Status: DISCONTINUED | OUTPATIENT
Start: 2019-01-28 | End: 2019-01-28 | Stop reason: SURG

## 2019-01-28 RX ORDER — ASCORBIC ACID 500 MG
500 TABLET ORAL 2 TIMES DAILY
Status: DISCONTINUED | OUTPATIENT
Start: 2019-01-28 | End: 2019-01-31 | Stop reason: HOSPADM

## 2019-01-28 RX ORDER — SENNOSIDES 8.6 MG
1 TABLET ORAL DAILY
Status: DISCONTINUED | OUTPATIENT
Start: 2019-01-28 | End: 2019-01-31 | Stop reason: HOSPADM

## 2019-01-28 RX ORDER — GABAPENTIN 100 MG/1
100 CAPSULE ORAL EVERY 8 HOURS SCHEDULED
Status: DISCONTINUED | OUTPATIENT
Start: 2019-01-28 | End: 2019-01-31 | Stop reason: HOSPADM

## 2019-01-28 RX ADMIN — PROPOFOL 40 MG: 10 INJECTION, EMULSION INTRAVENOUS at 09:59

## 2019-01-28 RX ADMIN — BUPIVACAINE HYDROCHLORIDE 1.5 ML: 7.5 INJECTION, SOLUTION EPIDURAL; RETROBULBAR at 08:56

## 2019-01-28 RX ADMIN — OXYCODONE HYDROCHLORIDE AND ACETAMINOPHEN 1 TABLET: 5; 325 TABLET ORAL at 21:08

## 2019-01-28 RX ADMIN — GABAPENTIN 100 MG: 100 CAPSULE ORAL at 13:35

## 2019-01-28 RX ADMIN — FERROUS SULFATE TAB 325 MG (65 MG ELEMENTAL FE) 325 MG: 325 (65 FE) TAB at 16:15

## 2019-01-28 RX ADMIN — INSULIN LISPRO 1 UNITS: 100 INJECTION, SOLUTION INTRAVENOUS; SUBCUTANEOUS at 21:13

## 2019-01-28 RX ADMIN — EPHEDRINE SULFATE 10 MG: 50 INJECTION, SOLUTION INTRAMUSCULAR; INTRAVENOUS; SUBCUTANEOUS at 09:12

## 2019-01-28 RX ADMIN — OXYCODONE HYDROCHLORIDE AND ACETAMINOPHEN 1 TABLET: 5; 325 TABLET ORAL at 14:16

## 2019-01-28 RX ADMIN — PRAVASTATIN SODIUM 40 MG: 40 TABLET ORAL at 21:08

## 2019-01-28 RX ADMIN — INSULIN LISPRO 1 UNITS: 100 INJECTION, SOLUTION INTRAVENOUS; SUBCUTANEOUS at 17:19

## 2019-01-28 RX ADMIN — PROPOFOL 50 MCG/KG/MIN: 10 INJECTION, EMULSION INTRAVENOUS at 08:59

## 2019-01-28 RX ADMIN — SODIUM CHLORIDE, SODIUM LACTATE, POTASSIUM CHLORIDE, AND CALCIUM CHLORIDE: .6; .31; .03; .02 INJECTION, SOLUTION INTRAVENOUS at 10:49

## 2019-01-28 RX ADMIN — ASPIRIN 325 MG ORAL TABLET 325 MG: 325 PILL ORAL at 16:15

## 2019-01-28 RX ADMIN — CEFAZOLIN SODIUM 2000 MG: 2 SOLUTION INTRAVENOUS at 11:24

## 2019-01-28 RX ADMIN — SODIUM CHLORIDE, SODIUM LACTATE, POTASSIUM CHLORIDE, AND CALCIUM CHLORIDE 75 ML/HR: .6; .31; .03; .02 INJECTION, SOLUTION INTRAVENOUS at 16:23

## 2019-01-28 RX ADMIN — EPHEDRINE SULFATE 5 MG: 50 INJECTION, SOLUTION INTRAMUSCULAR; INTRAVENOUS; SUBCUTANEOUS at 09:04

## 2019-01-28 RX ADMIN — CEFAZOLIN SODIUM 2000 MG: 2 SOLUTION INTRAVENOUS at 20:09

## 2019-01-28 RX ADMIN — FENTANYL CITRATE 25 MCG: 50 INJECTION, SOLUTION INTRAMUSCULAR; INTRAVENOUS at 09:30

## 2019-01-28 RX ADMIN — FOLIC ACID 1 MG: 1 TABLET ORAL at 16:15

## 2019-01-28 RX ADMIN — CEFAZOLIN SODIUM 2000 MG: 2 SOLUTION INTRAVENOUS at 08:59

## 2019-01-28 RX ADMIN — STANDARDIZED SENNA CONCENTRATE 8.6 MG: 8.6 TABLET ORAL at 16:15

## 2019-01-28 RX ADMIN — Medication 1 TABLET: at 16:15

## 2019-01-28 RX ADMIN — FENTANYL CITRATE 50 MCG: 50 INJECTION, SOLUTION INTRAMUSCULAR; INTRAVENOUS at 08:19

## 2019-01-28 RX ADMIN — SODIUM CHLORIDE, SODIUM LACTATE, POTASSIUM CHLORIDE, AND CALCIUM CHLORIDE 50 ML/HR: .6; .31; .03; .02 INJECTION, SOLUTION INTRAVENOUS at 07:22

## 2019-01-28 RX ADMIN — PROPOFOL 40 MG: 10 INJECTION, EMULSION INTRAVENOUS at 09:04

## 2019-01-28 RX ADMIN — CHLORHEXIDINE GLUCONATE 0.12% ORAL RINSE 15 ML: 1.2 LIQUID ORAL at 07:26

## 2019-01-28 RX ADMIN — OXYCODONE HYDROCHLORIDE AND ACETAMINOPHEN 500 MG: 500 TABLET ORAL at 17:20

## 2019-01-28 RX ADMIN — ROPIVACAINE HYDROCHLORIDE 150 MG: 5 INJECTION, SOLUTION EPIDURAL; INFILTRATION; PERINEURAL at 08:27

## 2019-01-28 RX ADMIN — MIDAZOLAM HYDROCHLORIDE 1 MG: 1 INJECTION, SOLUTION INTRAMUSCULAR; INTRAVENOUS at 08:55

## 2019-01-28 RX ADMIN — LOSARTAN POTASSIUM 100 MG: 50 TABLET, FILM COATED ORAL at 16:15

## 2019-01-28 RX ADMIN — METFORMIN HYDROCHLORIDE 750 MG: 750 TABLET, EXTENDED RELEASE ORAL at 16:17

## 2019-01-28 RX ADMIN — FENTANYL CITRATE 25 MCG: 50 INJECTION, SOLUTION INTRAMUSCULAR; INTRAVENOUS at 09:59

## 2019-01-28 RX ADMIN — MIDAZOLAM HYDROCHLORIDE 1 MG: 1 INJECTION, SOLUTION INTRAMUSCULAR; INTRAVENOUS at 08:19

## 2019-01-28 RX ADMIN — GABAPENTIN 100 MG: 100 CAPSULE ORAL at 21:08

## 2019-01-28 NOTE — H&P (VIEW-ONLY)
Assessment:     1  Primary localized osteoarthritis of left knee        Plan:  The patient was seen and examined by Dr Cherlyn Severin and myself  Problem List Items Addressed This Visit        Musculoskeletal and Integument    Primary localized osteoarthritis of left knee - Primary     Findings consistent with left knee osteoarthritis  Findings and treatment options were discussed with the patient  Patient continues to have significant pain in his left knee despite conservative treatment  Recommend left total knee arthroplasty at this time  Risks, benefits and complications of the surgery were discussed in detail by Dr Cherlyn Severin and informed consent was obtained  Patient did have an elevated hemoglobin A1c when last drawn in November 2018  It has since been addressed by his PCP last month  We will redraw the hemoglobin A1c, and explained to patient that if it is elevated again we may have to postpone surgery until his diabetes is better controlled  All questions were answered to patient's satisfaction  Relevant Medications    ferrous sulfate 324 (65 Fe) mg    folic acid (FOLVITE) 1 mg tablet    Multiple Vitamin (MULTI-VITAMIN) tablet    ascorbic acid (VITAMIN C) 500 mg tablet    Other Relevant Orders    CBC and differential    Ambulatory referral to Internal Medicine    Case request operating room: ARTHROPLASTY LEFT KNEE TOTAL (Completed)              Subjective:     Patient ID: Virginia Barrera is a 68 y o  male  Chief Complaint: This is a 43-year-old white male following up for bilateral knee osteoarthritis  He continues to have significant pain in his bilateral knees, left worse than right  He did not feel much relief from the viscosupplementation injections given in the past   He would like to proceed with scheduling a left total knee arthroplasty        Allergy:  Allergies   Allergen Reactions    Ceftin [Cefuroxime] Itching     Medications:  all current active meds have been reviewed  Past Medical History:  Past Medical History:   Diagnosis Date    Atrial fibrillation ([address] )     Fatty liver     Pneumonia      Past Surgical History:  Past Surgical History:   Procedure Laterality Date    AORTIC VALVE REPLACEMENT      COLECTOMY      COLONOSCOPY      DENTAL SURGERY      KNEE SURGERY      LYMPHADENECTOMY      OTHER SURGICAL HISTORY      MAZE PROCEDURE     Family History:  Family History   Problem Relation Age of Onset    Heart block Family      Social History:  History   Alcohol Use    Yes     History   Drug Use No     History   Smoking Status    Former Smoker    Packs/day: 1 00    Years: 40 00    Types: Cigarettes    Quit date: 2009   Smokeless Tobacco    Never Used     Review of Systems   Constitutional: Negative  HENT: Negative  Eyes: Negative  Respiratory: Negative  Cardiovascular: Negative  Gastrointestinal: Negative  Endocrine: Negative  Genitourinary: Negative  Musculoskeletal: Positive for arthralgias (bilateral knee)  Skin: Negative  Allergic/Immunologic: Negative  Neurological: Negative  Hematological: Negative  Psychiatric/Behavioral: Negative  Objective:  BP Readings from Last 1 Encounters:   01/04/19 116/68      Wt Readings from Last 1 Encounters:   01/04/19 82 1 kg (181 lb)      BMI:   Estimated body mass index is 25 24 kg/m² as calculated from the following:    Height as of this encounter: 5' 11" (1 803 m)  Weight as of this encounter: 82 1 kg (181 lb)  BSA:   Estimated body surface area is 2 02 meters squared as calculated from the following:    Height as of this encounter: 5' 11" (1 803 m)  Weight as of this encounter: 82 1 kg (181 lb)  Physical Exam   Constitutional: He is oriented to person, place, and time  He appears well-developed  HENT:   Head: Normocephalic and atraumatic  Eyes: Conjunctivae and EOM are normal    Neck: Neck supple  Cardiovascular: Normal rate, regular rhythm and normal heart sounds      No murmur heard   Pulmonary/Chest: Effort normal and breath sounds normal  No respiratory distress  Musculoskeletal:        Right knee: He exhibits no effusion  Left knee: He exhibits no effusion  Neurological: He is alert and oriented to person, place, and time  Skin: Skin is warm  Psychiatric: He has a normal mood and affect  Nursing note and vitals reviewed  Right Knee Exam     Tenderness   The patient is experiencing no tenderness  Range of Motion   The patient has normal right knee ROM  Muscle Strength     The patient has normal right knee strength  Tests   Varus: negative  Valgus: negative    Other   Erythema: absent  Sensation: normal  Pulse: present  Swelling: none  Other tests: no effusion present    Comments:  Genu varum  Joint hypertrophy  Patellofemoral crepitation      Left Knee Exam     Tenderness   The patient is experiencing no tenderness  Range of Motion   The patient has normal left knee ROM  Muscle Strength     The patient has normal left knee strength      Tests   Varus: negative  Valgus: negative    Other   Erythema: absent  Sensation: normal  Pulse: present  Swelling: none  Effusion: no effusion present    Comments:  Genu varum  Joint hypertrophy  Patellofemoral crepitation              Procedures

## 2019-01-28 NOTE — CONSULTS
Consult- Herminia Roca 1945, 68 y o  male MRN: [id number]    Unit/Bed#: [address] [place] [address] Encounter: [id number]    Primary Care Provider: Joselyn Guirdy DO   Date and time admitted to hospital: 1/28/2019  6:57 AM      Inpatient consult to Internal Medicine  Consult performed by: Preeti Madrid ordered by: Joseline Jimenez        S/P total knee arthroplasty, left   Assessment & Plan    -  Orthopedic surgery attending     Essential hypertension   Assessment & Plan    -  Continue Cozaar  -  Restart metoprolol in am  -  Hold Spironolactone today, possibly restart tomorrow  -  Monitor blood pressure         Type 2 diabetes mellitus without complication, without long-term current use of insulin [facility])   Assessment & Plan    Lab Results   Component Value Date    HGBA1C 7 0 (H) 01/08/2019       Recent Labs      01/28/19   0720  01/28/19   1110   POCGLU  129  112       Blood Sugar Average: Last 72 hrs:  (P) 120 5     -  Blood sugar checks QID with SSI coverage  -  Hold metformin, patient had EROS on preoperative labs  - BMP in am     Dyslipidemia   Assessment & Plan    - Continue home medication   -  Cardiac diet     Coronary disease   Assessment & Plan    -  Had pre-operative clearance with stress test by cardiology at [facility], -  History of paroxysmal afib after procedure years ago, no evidence since  Not on anticoagulation pre operatively  -  Continue statin and beta blocker  -  Obtain EKG for any chest pain             VTE Prophylaxis: Enoxaparin (Lovenox)  / sequential compression device     Counseling / Coordination of Care Time: 30 minutes  Greater than 50% of total time spent on patient counseling and coordination of care  Collaboration of Care:  Were Recommendations Directly Discussed with Primary Treatment Team? - No     History of Present Illness:    Herminia Roca is a 68 y o  male who is originally admitted to the orthopedic service due to left knee osteoarthritis with left knee total arthroplasty  We are consulted for medical management of chronic health problems  Patient has a history of hypertension, CAD, type 2 diabetes and osteoarthritis  His blood glucose is fairly  controlled with a hemoglobin A1c of 7 0  The patient takes metoprolol, Cozaar, and spironolactone for blood pressure management  He is currently normotensive with a systolic blood pressure of 110  The patient has a history of CAD was evaluated for preoperative clearance by his cardiology team at [facility]  [facility]           The patient was noted to have a mild EROS with a creatinine of 1 42 on January 8  His metformin is now on hold, and the patient was placed on q i d  Blood sugar checks with sliding scale insulin  A BMP will be re-evaluated in the morning  The patient was placed on Lovenox by attending physician for postoperative anticoagulation and DVT prophylaxis, will evaluate the need to possibly change to heparin tomorrow if the patient has a decreased GFR  Review of Systems:    Review of Systems   Constitutional: Negative for activity change, chills, diaphoresis, fatigue and fever  Eyes: Negative for photophobia and visual disturbance  Respiratory: Negative for cough, shortness of breath and wheezing  Cardiovascular: Negative for chest pain, palpitations and leg swelling  Gastrointestinal: Negative for abdominal pain, diarrhea, nausea and vomiting  Endocrine: Negative for polydipsia, polyphagia and polyuria  Genitourinary: Negative for dysuria, flank pain, hematuria and urgency  Musculoskeletal: Positive for gait problem  Negative for back pain, neck pain and neck stiffness  Pre-operative left knee pain   Skin: Negative for pallor, rash and wound  Neurological: Negative for dizziness, tremors, syncope, weakness, light-headedness, numbness and headaches  All other systems reviewed and are negative        Past Medical and Surgical History:     Past Medical History:   Diagnosis Date    [facility]     Colitis     Diabetes mellitus ([address] )     Fatty liver     History of chemotherapy     History of radiation therapy     History of shingles 2018    Hypertension     Pneumonia        Past Surgical History:   Procedure Laterality Date    AORTIC VALVE REPLACEMENT      COLECTOMY      COLONOSCOPY      DENTAL SURGERY      KNEE SURGERY      LYMPHADENECTOMY      OTHER SURGICAL HISTORY      MAZE PROCEDURE       Meds/Allergies:    all medications and allergies reviewed    Allergies: Allergies   Allergen Reactions    Ceftin [Cefuroxime] Itching       Social History:     Marital Status: /Civil Union    Substance Use History:   History   Alcohol Use    Yes     Comment: 5-6 beers a week     History   Smoking Status    Former Smoker    Packs/day: 1 00    Years: 40 00    Types: Cigarettes    Quit date: 2009   Smokeless Tobacco    Never Used     History   Drug Use No       Family History:    non-contributory    Physical Exam:     Vitals:   Blood Pressure: 109/55 (01/28/19 1623)  Pulse: 68 (01/28/19 1623)  Temperature: 97 5 °F (36 4 °C) (01/28/19 1623)  Temp Source: Axillary (01/28/19 1623)  Respirations: 18 (01/28/19 1623)  Height: 5' 11" (180 3 cm) (01/28/19 7310)  Weight - Scale: 79 8 kg (176 lb) (01/28/19 0712)  SpO2: 98 % (01/28/19 1623)    Physical Exam   Constitutional: He is oriented to person, place, and time  Vital signs are normal  He appears well-developed and well-nourished  He is cooperative  No distress  HENT:   Head: Normocephalic and atraumatic  Eyes: Pupils are equal, round, and reactive to light  Conjunctivae and EOM are normal  No scleral icterus  Neck: Trachea normal  Neck supple  No JVD present  Cardiovascular: Normal rate, regular rhythm, normal heart sounds and intact distal pulses  Exam reveals no gallop, no distant heart sounds and no friction rub      Pulmonary/Chest: Effort normal and breath sounds normal  No accessory muscle usage  No respiratory distress  Abdominal: Soft  Normal appearance and bowel sounds are normal  There is no tenderness  Musculoskeletal:   Left knee ace wrap dry/intact   Neurological: He is alert and oriented to person, place, and time  He has normal strength  GCS eye subscore is 4  GCS verbal subscore is 5  GCS motor subscore is 6  Skin: Skin is warm, dry and intact  Psychiatric: He has a normal mood and affect  Nursing note and vitals reviewed  Additional Data:     Lab Results: I have personally reviewed pertinent reports  Lab Results   Component Value Date/Time    HGBA1C 7 0 (H) 01/08/2019 08:54 AM    HGBA1C 8 1 (H) 11/26/2018 04:49 AM    HGBA1C 7 1 (H) 08/28/2018 08:03 AM    HGBA1C 7 8 (H) 05/18/2018 08:39 AM       Results from last 7 days  Lab Units 01/28/19  1110 01/28/19  0720   POC GLUCOSE mg/dl 112 129           No orders to display       EKG, Pathology, and Other Studies Reviewed on Admission:   · EKG: no EKG on file, reviewed note from [place]    ** Please Note: This note has been constructed using a voice recognition system   **

## 2019-01-28 NOTE — ASSESSMENT & PLAN NOTE
-  Had pre-operative clearance with stress test by cardiology at [facility], -  History of paroxysmal afib after procedure years ago, no evidence since  Not on anticoagulation pre operatively    -  Continue statin and beta blocker  -  Obtain EKG for any chest pain

## 2019-01-28 NOTE — ANESTHESIA PROCEDURE NOTES
Peripheral Block    Patient location during procedure: holding area  Start time: 1/28/2019 8:19 AM  Reason for block: at surgeon's request and post-op pain management  Staffing  Performed: anesthesiologist   Preanesthetic Checklist  Completed: patient identified, site marked, surgical consent, pre-op evaluation, timeout performed, IV checked, risks and benefits discussed and monitors and equipment checked  Peripheral Block  Patient position: supine  Prep: Betadine  Patient monitoring: heart rate, cardiac monitor, continuous pulse ox and frequent blood pressure checks  Block type: adductor canal block  Laterality: left  Injection technique: single-shot  Procedures: Doppler guided and ultrasound guided  Ultrasound permanent image saved  Local infiltration: ropivacaine  Infiltration strength: 0 5 %  Dose: 30 mL  Needle  Needle type: Stimuplex   Needle gauge: 22 G  Needle length: 10 cm  Needle localization: ultrasound guidance  Needle insertion depth: 2 cm  Assessment  Injection assessment: incremental injection, local visualized surrounding nerve on ultrasound, negative aspiration for heme and no paresthesia on injection  Paresthesia pain: none  Heart rate change: no  Slow fractionated injection: yes  Post-procedure:  site cleaned  patient tolerated the procedure well with no immediate complications

## 2019-01-28 NOTE — OP NOTE
OPERATIVE REPORT  PATIENT NAME: Navjot Bajwa    :  1945  MRN: [id number]  Pt Location:  OR ROOM 01    SURGERY DATE: 2019    Surgeon(s) and Role:     * Herve Dempsey MD - Primary     * Lakshmi Jhaveri PA-C - Assisting    Preop Diagnosis:  Primary localized osteoarthritis of left knee [M17 12]    Post-Op Diagnosis Codes:     * Primary localized osteoarthritis of left knee [M17 12]    Procedure(s) (LRB):  ARTHROPLASTY LEFT KNEE TOTAL (Left)    Specimen(s):  * No specimens in log *    Estimated Blood Loss:   Minimal    Drains:  NA    TT:  71 minutes    Anesthesia Type:   Spinal, Sedation, Adductor canal block    Operative Indications:  Primary localized osteoarthritis of left knee [M17 12]    Prosthesis: Depuy Attune+ posterior cruciate-sacrificing femur size 6, tibia size 7, Patella 38, with 8 mm mobile spacer  Navjot Bajwa is a 68y o  years old male diagnosed with left knee osteoarthritis  Patient failed conservative treatments and elected to proceed with surgical intervention  The risks and complications are discussed with the patient  The patient consented to the procedure  Procedure: Patient was brought into the OR and placed in supine position  Patient was anethetized and intubated with LMA without any complications  Patient also had femoral block done in the holding area  Patient's left knee was prepped and draped in sterile fashion with tourniquet in the upper thigh  A time out was call and identified the left knee was the operating site  The leg was then exsanguinated with Esmarch  The tourniquet is inflated to 250 mmHg  A longitudinal incision was made center over the left knee  Dissection was then carried down to the extensor mechanism  A medial parapatellar arthrotomy was done to gain access into the knee joint  The patient appeared to have grade 3-4 changes in P-F, grade 1-2 in lateral and grade 4 in medial compartment  The meniscus were removed  The anterior fat was also excised  The ACL and PCL were detached and excised  The tibia was then subluxed anteriorly with a retractor  An external tibia cutting guide is used to carried out the proximal tibia osteotomy  Care was make sure the external cutting guide was lined up with tibia anteriorly and also to the 2nd metatarsal with 3 degree of posterior sloping  Once the tibia cut was complete, the attention was then turn to the distal femur  An intramedullary cutting guide was used to perform the distal femoral cut  An extension block was use to assess the soft tissue balance with knee in full extension, which appears to be satisfactory with a 8 mm spacer  The distal femur was then sized and 2 pin were inserted to set the femoral component rotation  The flexion gap was assessed using the 8 mm space which showed good tissue tension medial and lateral in 90* flexed position  The size 6 4-in-1 and chamfer cutting guide was used to complete the distal cut  The size 6 femoral trial was then placed onto the distal femur, which appears to have good fit with no lateral or medial overriding  Attention was then turned to the tibia, which was prepared with the tibial tray in slight external rotation  With a 8 mm trial spacer placed, the knee had good stability in mid-flexion and full extension  The patella was prepared to accommodate a size 38 button  A trial button was then used  Range motion of the knee was then done  The patella appeared to be gliding nicely without tilting or subluxing  The trials were removed and the bony surface was irrigated with saline solution  The Aquamantys was used to treat the posterior capsule  30 cc of Duramorph solution was injected into the posterior, medial and lateral soft tissue  Once the bone surface is dried up and all the components were open  The cement was mixed and final prosthesis was then placed onto the distal femur, proximal tibia, and patella with cement  Excess cement was removed   Irricept was used to irrigate the joint and allow it to stand for 1 minutes  Once the cement was harden, the tourniquet was let down  There were no active arterial bleeding  The venous bleeding was control with electro-Bovie and Aquamantys  The extensor mechanism was then repaired with #1 Vicryl in an interrupted fashion  Layered closure was carried out with 0 Vicryl and 2-0 Vicryl, and Stratafix was used to close the skin  Steri-strips were then applied with a sterile bulky dressing  The patient tolerated the procedure well without any complications  Patient is then extubated and transferred to recovery room for post-op care  The family was contacted  There was no qualified resident available to assist     Mrs Dail Gottron was required in the OR in helping retracting, exposing the joint, and placement of the knee prosthesis      Complications:   None    Patient Disposition:  PACU     SIGNATURE: Kayley Ibrahim MD  DATE: January 28, 2019  TIME: 10:51 AM

## 2019-01-28 NOTE — ANESTHESIA PROCEDURE NOTES
Spinal Block    Patient location during procedure: OR  Start time: 1/28/2019 8:56 AM  Reason for block: primary anesthetic  Staffing  Performed: anesthesiologist   Preanesthetic Checklist  Completed: patient identified, site marked, surgical consent, pre-op evaluation, timeout performed, IV checked, risks and benefits discussed and monitors and equipment checked  Spinal Block  Patient position: sitting  Prep: Betadine  Patient monitoring: heart rate, cardiac monitor, continuous pulse ox and frequent blood pressure checks  Approach: midline  Location: L3-4  Injection technique: single-shot  Needle  Needle type: Tuohy   Needle gauge: 25 G  Needle length: 5 cm  Assessment  Sensory level: T10  Events: cerebrospinal fluid  Injection Assessment:  positive aspiration for clear CSF, negative aspiration for heme and no paresthesia on injection    Post-procedure:  site cleaned  Additional Notes  Pt tolerated procedure well

## 2019-01-28 NOTE — ANESTHESIA PREPROCEDURE EVALUATION
Review of Systems/Medical History  Patient summary reviewed  Chart reviewed      Cardiovascular  Hypertension controlled, CAD , History of CABG, Dysrhythmias , atrial fibrillation,    Pulmonary       GI/Hepatic    GERD , Liver disease ,   Comment: fattyliver          Endo/Other     GYN       Hematology  Negative hematology ROS      Musculoskeletal    Arthritis     Neurology  Negative neurology ROS      Psychology           Physical Exam    Airway    Mallampati score: II  TM Distance: >3 FB  Neck ROM: full     Dental   upper dentures and lower dentures,     Cardiovascular  Rhythm: regular, Rate: normal, Cardiovascular exam normal    Pulmonary  Pulmonary exam normal Breath sounds clear to auscultation,     Other Findings        Anesthesia Plan  ASA Score- 3     Anesthesia Type- spinal, IV sedation with anesthesia and regional with ASA Monitors  Additional Monitors:   Airway Plan:     Comment: Benefits and risks of planned anesthetic discussed with patient, and agrees to proceed  I, Dr Roxanne Day, the attending anesthesiologist, have personally seen and evaluated the patient prior to anesthetic care  I have reviewed the preanesthetic record, and other medical records if appropriate to the anesthetic care  If a CRNA is involved in the case, I have reviewed the CRNA assessment, if present, and agree  The patient is in a suitable condition to proceed with my formulated anesthetic plan        Plan Factors-    Induction- intravenous  Postoperative Plan- Plan for postoperative opioid use  Informed Consent- Anesthetic plan and risks discussed with patient

## 2019-01-28 NOTE — PHYSICAL THERAPY NOTE
PT eval/tx   01/28/19 1400   Note Type   Note type Eval/Treat   Pain Assessment   Pain Assessment No/denies pain   Home Living   Type of [address]; Able to live on main level with bedroom/bathroom  (1 small nelly)   [address]   Prior Function   Level of [place] Independent with ADLs and functional mobility   Lives With Spouse   Receives Help From Family   ADL Assistance Independent   IADLs Independent   Falls in the last 6 months 0   Vocational Part time employment   Comments likes to travel when not working   Restrictions/Precautions   Weight Bearing Precautions Per Order Yes   LLE Weight Bearing Per Order WBAT   General   Additional Pertinent History cabg, lymphoma, multiple myeloma   Family/Caregiver Present Yes   Cognition   Overall Cognitive Status WFL   Arousal/Participation Alert   Orientation Level Oriented X4   Memory Within functional limits   Following Commands Follows all commands and directions without difficulty   RUE Assessment   RUE Assessment WFL   LUE Assessment   LUE Assessment WFL   RLE Assessment   RLE Assessment WFL   LLE Assessment   LLE Assessment (ankle hip wfl, knee 0-45 in sitting PROM,good quad set)   Coordination   Movements are Fluid and Coordinated 1   Bed Mobility   Supine to Sit 4  Minimal assistance   Additional items Assist x 1   Sit to Supine 5  Supervision   Additional items Assist x 1;LE management;Verbal cues; Increased time required   Transfers   Sit to Stand 4  Minimal assistance   Additional items Assist x 1;Verbal cues;Armrests   Stand to Sit 4  Minimal assistance   Additional items Bedrails;Verbal cues   Stand pivot 4  Minimal assistance   Additional items Assist x 1; Armrests; Verbal cues  (rw)   Ambulation/Elevation   Gait pattern Inconsistent jeremiah; Short stride;Decreased L stance;Decreased foot clearance; Improper Weight shift   Gait Assistance 4  Minimal assist   Additional items Assist x 1;Verbal cues   Assistive Device Rolling walker   Distance few steps edge of bed good quad control   Balance   Static Sitting Good   Dynamic Sitting Good   Static Standing Fair   Dynamic Standing Fair   Ambulatory Fair   Endurance Deficit   Endurance Deficit No   Activity Tolerance   Activity Tolerance Patient tolerated treatment well   Medical Staff Made Aware yes   Nurse Made Aware yes   Assessment   Prognosis Good   Problem List Decreased strength;Decreased range of motion;Decreased endurance; Impaired balance;Decreased mobility; Decreased safety awareness   Assessment Pt presents s/p L TKR today with impair ROM, strength and gait  Able to mobilize with rw adn good quad control thus far  Will ofllow BID and progress to home d/c  See goals  Goals   Patient Goals get better  walk better   STG Expiration Date 01/31/19   Short Term Goal #1 1) L knee arom 0-90 degrees 2) safe ind transfers with rw 3) safe ind amb with rw 150' level and up/down 1 step 4) improve strength to 3/5 L knee   Plan   Treatment/Interventions ADL retraining;Functional transfer training;LE strengthening/ROM; Therapeutic exercise;Patient/family training; Endurance training;Elevations; Equipment eval/education; Bed mobility;Gait training;Spoke to nursing;Spoke to case management   PT Frequency 5x/wk; Twice a day   Recommendation   Recommendation Home with family support; Outpatient PT   Equipment Recommended Heydi Rogers  (has own)   PT - OK to Discharge No   Barthel Index   Feeding 10   Bathing 0   Grooming Score 5   Dressing Score 5   Bladder Score 10   Bowels Score 10   Toilet Use Score 5   Transfers (Bed/Chair) Score 10   Mobility (Level Surface) Score 0   Stairs Score 0   Barthel Index Score 55   Ene Downs, PT

## 2019-01-28 NOTE — ASSESSMENT & PLAN NOTE
Lab Results   Component Value Date    HGBA1C 7 0 (H) 01/08/2019       Recent Labs      01/28/19   0720  01/28/19   1110   POCGLU  129  112       Blood Sugar Average: Last 72 hrs:  (P) 120 5     -  Blood sugar checks QID with SSI coverage  -  Hold metformin, patient had EROS on preoperative labs  - BMP in am

## 2019-01-28 NOTE — ASSESSMENT & PLAN NOTE
-  Continue Cozaar  -  Restart metoprolol in am  -  Hold Spironolactone today, possibly restart tomorrow    -  Monitor blood pressure

## 2019-01-28 NOTE — PLAN OF CARE
Problem: Potential for Falls  Goal: Patient will remain free of falls  INTERVENTIONS:  - Assess patient frequently for physical needs  -  Identify cognitive and physical deficits and behaviors that affect risk of falls    -  [place] fall precautions as indicated by assessment   - Educate patient/family on patient safety including physical limitations  - Instruct patient to call for assistance with activity based on assessment  - Modify environment to reduce risk of injury  - Consider OT/PT consult to assist with strengthening/mobility   Outcome: Progressing      Problem: METABOLIC, FLUID AND ELECTROLYTES - ADULT  Goal: Glucose maintained within target range  INTERVENTIONS:  - Monitor Blood Glucose as ordered  - Assess for signs and symptoms of hyperglycemia and hypoglycemia  - Administer ordered medications to maintain glucose within target range  - Assess nutritional intake and initiate nutrition service referral as needed  Outcome: Progressing      Problem: SKIN/TISSUE INTEGRITY - ADULT  Goal: Skin integrity remains intact  INTERVENTIONS  - Identify patients at risk for skin breakdown  - Assess and monitor skin integrity  - Assess and monitor nutrition and hydration status  - Monitor labs (i e  albumin)  - Assess for incontinence   - Turn and reposition patient  - Assist with mobility/ambulation  - Relieve pressure over bony prominences  - Avoid friction and shearing  - Provide appropriate hygiene as needed including keeping skin clean and dry  - Evaluate need for skin moisturizer/barrier cream  - Collaborate with interdisciplinary team (i e  Nutrition, Rehabilitation, etc )   - Patient/family teaching  Outcome: Progressing    Goal: Incision(s), wounds(s) or drain site(s) healing without S/S of infection  INTERVENTIONS  - Assess and document risk factors for skin impairment   - Assess and document dressing, incision, wound bed, drain sites and surrounding tissue  - Initiate Nutrition services consult and/or wound management as needed  Outcome: Progressing      Problem: MUSCULOSKELETAL - ADULT  Goal: Maintain or return mobility to safest level of function  INTERVENTIONS:  - Assess patient's ability to carry out ADLs; assess patient's baseline for ADL function and identify physical deficits which impact ability to perform ADLs (bathing, care of mouth/teeth, toileting, grooming, dressing, etc )  - Assess/evaluate cause of self-care deficits   - Assess range of motion  - Assess patient's mobility; develop plan if impaired  - Assess patient's need for assistive devices and provide as appropriate  - Encourage maximum independence but intervene and supervise when necessary  - Involve family in performance of ADLs  - Assess for home care needs following discharge   - Request OT consult to assist with ADL evaluation and planning for discharge  - Provide patient education as appropriate  Outcome: Progressing    Goal: Maintain proper alignment of affected body part  INTERVENTIONS:  - Support, maintain and protect limb and body alignment  - Provide pt/fam with appropriate education  Outcome: Progressing      Problem: PAIN - ADULT  Goal: Verbalizes/displays adequate comfort level or baseline comfort level  Interventions:  - Encourage patient to monitor pain and request assistance  - Assess pain using appropriate pain scale  - Administer analgesics based on type and severity of pain and evaluate response  - Implement non-pharmacological measures as appropriate and evaluate response  - Consider cultural and social influences on pain and pain management  - Notify physician/advanced practitioner if interventions unsuccessful or patient reports new pain  Outcome: Progressing      Problem: INFECTION - ADULT  Goal: Absence or prevention of progression during hospitalization  INTERVENTIONS:  - Assess and monitor for signs and symptoms of infection  - Monitor lab/diagnostic results  - Monitor all insertion sites, i e  indwelling lines, tubes, and drains  - Monitor endotracheal (as able) and nasal secretions for changes in amount and color  - [place] appropriate cooling/warming therapies per order  - Administer medications as ordered  - Instruct and encourage patient and family to use good hand hygiene technique  - Identify and instruct in appropriate isolation precautions for identified infection/condition  Outcome: Progressing      Problem: SAFETY ADULT  Goal: Patient will remain free of falls  INTERVENTIONS:  - Assess patient frequently for physical needs  -  Identify cognitive and physical deficits and behaviors that affect risk of falls  -  [place] fall precautions as indicated by assessment   - Educate patient/family on patient safety including physical limitations  - Instruct patient to call for assistance with activity based on assessment  - Modify environment to reduce risk of injury  - Consider OT/PT consult to assist with strengthening/mobility   Outcome: Progressing      Problem: DISCHARGE PLANNING  Goal: Discharge to home or other facility with appropriate resources  INTERVENTIONS:  - Identify barriers to discharge w/patient and caregiver  - Arrange for needed discharge resources and transportation as appropriate  - Identify discharge learning needs (meds, wound care, etc )  - Arrange for interpretive services to assist at discharge as needed  - Refer to Case Management Department for coordinating discharge planning if the patient needs post-hospital services based on physician/advanced practitioner order or complex needs related to functional status, cognitive ability, or social support system  Outcome: Progressing      Problem: Knowledge Deficit  Goal: Patient/family/caregiver demonstrates understanding of disease process, treatment plan, medications, and discharge instructions  Complete learning assessment and assess knowledge base    Interventions:  - Provide teaching at level of understanding  - Provide teaching via preferred learning methods  Outcome: Progressing      Problem: Nutrition/Hydration-ADULT  Goal: Nutrient/Hydration intake appropriate for improving, restoring or maintaining nutritional needs  Monitor and assess patient's nutrition/hydration status for malnutrition (ex- brittle hair, bruises, dry skin, pale skin and conjunctiva, muscle wasting, smooth red tongue, and disorientation)  Collaborate with interdisciplinary team and initiate plan and interventions as ordered  Monitor patient's weight and dietary intake as ordered or per policy  Utilize nutrition screening tool and intervene per policy  Determine patient's food preferences and provide high-protein, high-caloric foods as appropriate       INTERVENTIONS:  - Monitor oral intake, urinary output, labs, and treatment plans  - Assess nutrition and hydration status and recommend course of action  - Evaluate amount of meals eaten  - Assist patient with eating if necessary   - Allow adequate time for meals  - Recommend/ encourage appropriate diets, oral nutritional supplements, and vitamin/mineral supplements  - Order, calculate, and assess calorie counts as needed  - Recommend, monitor, and adjust tube feedings and TPN/PPN based on assessed needs  - Assess need for intravenous fluids  - Provide specific nutrition/hydration education as appropriate  - Include patient/family/caregiver in decisions related to nutrition  Outcome: Progressing

## 2019-01-29 PROBLEM — D62 POSTOPERATIVE ANEMIA DUE TO ACUTE BLOOD LOSS: Status: ACTIVE | Noted: 2019-01-29

## 2019-01-29 LAB
ANION GAP SERPL CALCULATED.3IONS-SCNC: 8 MMOL/L (ref 4–13)
BUN SERPL-MCNC: 26 MG/DL (ref 5–25)
CALCIUM SERPL-MCNC: 8.5 MG/DL (ref 8.3–10.1)
CHLORIDE SERPL-SCNC: 103 MMOL/L (ref 100–108)
CO2 SERPL-SCNC: 29 MMOL/L (ref 21–32)
CREAT SERPL-MCNC: 1.44 MG/DL (ref 0.6–1.3)
ERYTHROCYTE [DISTWIDTH] IN BLOOD BY AUTOMATED COUNT: 15.1 % (ref 11.6–15.1)
GFR SERPL CREATININE-BSD FRML MDRD: 48 ML/MIN/1.73SQ M
GLUCOSE SERPL-MCNC: 110 MG/DL (ref 65–140)
GLUCOSE SERPL-MCNC: 110 MG/DL (ref 65–140)
GLUCOSE SERPL-MCNC: 112 MG/DL (ref 65–140)
GLUCOSE SERPL-MCNC: 146 MG/DL (ref 65–140)
GLUCOSE SERPL-MCNC: 179 MG/DL (ref 65–140)
HCT VFR BLD AUTO: 31.2 % (ref 36.5–49.3)
HGB BLD-MCNC: 10 G/DL (ref 12–17)
MCH RBC QN AUTO: 28.4 PG (ref 26.8–34.3)
MCHC RBC AUTO-ENTMCNC: 32.1 G/DL (ref 31.4–37.4)
MCV RBC AUTO: 89 FL (ref 82–98)
PLATELET # BLD AUTO: 232 THOUSANDS/UL (ref 149–390)
PMV BLD AUTO: 10.3 FL (ref 8.9–12.7)
POTASSIUM SERPL-SCNC: 4.4 MMOL/L (ref 3.5–5.3)
RBC # BLD AUTO: 3.52 MILLION/UL (ref 3.88–5.62)
SODIUM SERPL-SCNC: 140 MMOL/L (ref 136–145)
WBC # BLD AUTO: 9.15 THOUSAND/UL (ref 4.31–10.16)

## 2019-01-29 PROCEDURE — 80048 BASIC METABOLIC PNL TOTAL CA: CPT | Performed by: PHYSICIAN ASSISTANT

## 2019-01-29 PROCEDURE — 97116 GAIT TRAINING THERAPY: CPT

## 2019-01-29 PROCEDURE — 85027 COMPLETE CBC AUTOMATED: CPT | Performed by: PHYSICIAN ASSISTANT

## 2019-01-29 PROCEDURE — 99024 POSTOP FOLLOW-UP VISIT: CPT | Performed by: ORTHOPAEDIC SURGERY

## 2019-01-29 PROCEDURE — 82948 REAGENT STRIP/BLOOD GLUCOSE: CPT

## 2019-01-29 PROCEDURE — 99232 SBSQ HOSP IP/OBS MODERATE 35: CPT | Performed by: INTERNAL MEDICINE

## 2019-01-29 PROCEDURE — 97110 THERAPEUTIC EXERCISES: CPT

## 2019-01-29 RX ADMIN — FOLIC ACID 1 MG: 1 TABLET ORAL at 08:38

## 2019-01-29 RX ADMIN — GABAPENTIN 100 MG: 100 CAPSULE ORAL at 21:01

## 2019-01-29 RX ADMIN — FERROUS SULFATE TAB 325 MG (65 MG ELEMENTAL FE) 325 MG: 325 (65 FE) TAB at 08:38

## 2019-01-29 RX ADMIN — OXYCODONE HYDROCHLORIDE AND ACETAMINOPHEN 500 MG: 500 TABLET ORAL at 18:27

## 2019-01-29 RX ADMIN — ENOXAPARIN SODIUM 30 MG: 30 INJECTION SUBCUTANEOUS at 21:00

## 2019-01-29 RX ADMIN — GABAPENTIN 100 MG: 100 CAPSULE ORAL at 05:18

## 2019-01-29 RX ADMIN — PRAVASTATIN SODIUM 40 MG: 40 TABLET ORAL at 21:01

## 2019-01-29 RX ADMIN — SODIUM CHLORIDE, SODIUM LACTATE, POTASSIUM CHLORIDE, AND CALCIUM CHLORIDE 75 ML/HR: .6; .31; .03; .02 INJECTION, SOLUTION INTRAVENOUS at 10:03

## 2019-01-29 RX ADMIN — Medication 1 TABLET: at 08:38

## 2019-01-29 RX ADMIN — ONDANSETRON 4 MG: 2 INJECTION, SOLUTION INTRAMUSCULAR; INTRAVENOUS at 08:44

## 2019-01-29 RX ADMIN — GABAPENTIN 100 MG: 100 CAPSULE ORAL at 13:16

## 2019-01-29 RX ADMIN — ASPIRIN 325 MG ORAL TABLET 325 MG: 325 PILL ORAL at 08:38

## 2019-01-29 RX ADMIN — ENOXAPARIN SODIUM 30 MG: 30 INJECTION SUBCUTANEOUS at 08:38

## 2019-01-29 RX ADMIN — STANDARDIZED SENNA CONCENTRATE 8.6 MG: 8.6 TABLET ORAL at 08:38

## 2019-01-29 RX ADMIN — OXYCODONE HYDROCHLORIDE AND ACETAMINOPHEN 1 TABLET: 5; 325 TABLET ORAL at 09:27

## 2019-01-29 RX ADMIN — SODIUM CHLORIDE, SODIUM LACTATE, POTASSIUM CHLORIDE, AND CALCIUM CHLORIDE 1000 ML: .6; .31; .03; .02 INJECTION, SOLUTION INTRAVENOUS at 08:51

## 2019-01-29 RX ADMIN — INSULIN LISPRO 1 UNITS: 100 INJECTION, SOLUTION INTRAVENOUS; SUBCUTANEOUS at 11:33

## 2019-01-29 RX ADMIN — DOCUSATE SODIUM 100 MG: 100 CAPSULE, LIQUID FILLED ORAL at 18:27

## 2019-01-29 RX ADMIN — OXYCODONE HYDROCHLORIDE AND ACETAMINOPHEN 1 TABLET: 5; 325 TABLET ORAL at 15:22

## 2019-01-29 RX ADMIN — FERROUS SULFATE TAB 325 MG (65 MG ELEMENTAL FE) 325 MG: 325 (65 FE) TAB at 18:27

## 2019-01-29 RX ADMIN — OXYCODONE HYDROCHLORIDE AND ACETAMINOPHEN 500 MG: 500 TABLET ORAL at 08:38

## 2019-01-29 RX ADMIN — PANTOPRAZOLE SODIUM 40 MG: 40 TABLET, DELAYED RELEASE ORAL at 05:18

## 2019-01-29 RX ADMIN — ACETAMINOPHEN 650 MG: 325 TABLET, FILM COATED ORAL at 21:00

## 2019-01-29 NOTE — PLAN OF CARE
Problem: PHYSICAL THERAPY ADULT  Goal: Performs mobility at highest level of function for planned discharge setting  See evaluation for individualized goals  Treatment/Interventions: LE strengthening/ROM, Elevations, Therapeutic exercise, Patient/family training, Equipment eval/education, Gait training, Spoke to nursing, Spoke to case management  Equipment Recommended: Janice Castaneda       See flowsheet documentation for full assessment, interventions and recommendations  Outcome: Progressing  Prognosis: Good  Problem List: Decreased strength, Decreased range of motion, Decreased mobility  Assessment: Pt progressing well with gait and ther ex  continues with IVF  Minimal pain  Cont BID  Barriers to Discharge: None     Recommendation: Home with family support, Home PT     PT - OK to Discharge: No    See flowsheet documentation for full assessment

## 2019-01-29 NOTE — ASSESSMENT & PLAN NOTE
Continue Cozaar and Toprol-XL with hold parameters to avoid hypotension  Patient still with ongoing low blood pressures will hold Aldactone  Monitor blood pressure with routine vitals  Agree with bolus for symptomatic hypotension

## 2019-01-29 NOTE — UTILIZATION REVIEW
Initial Clinical Review    Age/Sex: 68 y o  male ELECTIVE SURGERY ADMISSION   Surgery Date: 1/28/19  Procedure: ARTHROPLASTY LEFT KNEE TOTAL (Left)  Anesthesia: Spinal, Sedation, Adductor canal block  Admission Orders: Date/Time/Statement: 1/28/19 @ 1113   Orders Placed This Encounter   Procedures    Inpatient Admission     Standing Status:   Standing     Number of Occurrences:   1     Order Specific Question:   Admitting Physician     Answer:   [facility]     Order Specific Question:   Level of Care     Answer:   Med Surg [16]     Order Specific Question:   Estimated length of stay     Answer:   More than 2 Midnights     Order Specific Question:   Certification     Answer:   I certify that inpatient services are medically necessary for this patient for a duration of greater than two midnights  See H&P and MD Progress Notes for additional information about the patient's course of treatment  Vital Signs: BP 93/52 (BP Location: Left arm)   Pulse 85   Temp 99 1 °F (37 3 °C) (Tympanic)   Resp 18   Ht 5' 11" (1 803 m)   Wt 79 8 kg (176 lb)   SpO2 97%   BMI 24 55 kg/m²   Diet:        Diet Orders            Start     Ordered    01/29/19 0856  Room Service  Once     Question:  Type of Service  Answer:  Room Service-Appropriate    01/29/19 0727    01/28/19 1459  Diet Shaw/CHO Controlled; Consistent Carbohydrate Diet Level 2 (5 carb servings/75 grams CHO/meal)  Diet effective now     Question Answer Comment   Diet Type Shaw/CHO Controlled    Shaw/CHO Controlled Consistent Carbohydrate Diet Level 2 (5 carb servings/75 grams CHO/meal)    RD to adjust diet per protocol?  Yes        01/28/19 1458      ADMISSION ORDERS:  MED SURG  VS WITH NEUROVASCULAR CHECKS Q4H  INCENTIVE SPIROMETRY  CONSULT INTERNAL MEDICINE  PT/OT EVAL & TX  ACCUCHECKS WITH COVERAGE SCALE  Mobility: WBAT AS TOLERATED WITH ASSIST  DVT Prophylaxis: VENODYNES, LOVENOX  Scheduled Meds:  Current Facility-Administered Medications:  acetaminophen 650 mg Oral Q6H PRN Debroah Grills, PA-C    aluminum-magnesium hydroxide-simethicone 15 mL Oral Q6H PRN Debroah Grills, PA-C    ascorbic acid 500 mg Oral BID Debroah Grills, PA-C    aspirin 325 mg Oral Daily Debroah Grills, PA-C    bisacodyl 10 mg Rectal Daily PRN Debroah Grills, PA-C    calcium carbonate 1,000 mg Oral Daily PRN Debroah Grills, PA-C    docusate sodium 100 mg Oral BID Debroah Grills, PA-C    enoxaparin 30 mg Subcutaneous Q12H [facility] Debroah Grills, PA-C    ferrous sulfate 325 mg Oral BID With Meals Debroah Grills, PA-C    folic acid 1 mg Oral Daily Debroah Grills, PA-C    gabapentin 100 mg Oral [facility] Debroah Grills, [place]    insulin lispro 1-5 Units Subcutaneous TID [facility] April D DUNG Russell    insulin lispro 1-5 Units Subcutaneous HS April D DUNG Russell    losartan 100 mg Oral Daily Debroah Grills, AWA    metoprolol succinate 100 mg Oral Daily April D DUNG Russell    morphine injection 2 mg Intravenous Q4H PRN Debroah Grills, AWA    multivitamin-minerals 1 tablet Oral Daily Debroah Grills, AWA    ondansetron 4 mg Intravenous Q4H PRN Debroah Grills, AWA    oxyCODONE-acetaminophen 1 tablet Oral Q4H PRN Debroah Grills, AWA    oxyCODONE-acetaminophen 2 tablet Oral Q4H PRN Debroah Grills, PA-C    pantoprazole 40 mg Oral Early Morning Debroah Grills, PA-C    pravastatin 40 mg Oral HS Debroah Grills, PA-C    senna 1 tablet Oral Daily Debroah Grills, PA-C    simethicone 80 mg Oral 4x Daily PRN Debroah Grills, PA-C    IV ANCEF Q8H X3 DOSES  Continuous Infusions:  lactated ringers 75 mL/hr Last Rate: Stopped (01/29/19 0851)   PRN Meds:   acetaminophen    aluminum-magnesium hydroxide-simethicone    bisacodyl    calcium carbonate    morphine injection    ondansetron    oxyCODONE-acetaminophen    oxyCODONE-acetaminophen    simethicone  Pain Control:   Pain Medications             aspirin (HM ASPIRIN) 325 mg tablet Take 1 tablet by mouth daily      Network Utilization Review Department  Phone: [phone]; Fax [phone]  [email or web address]  ATTENTION: Please call with any questions or concerns to [phone]  and carefully listen to the prompts so that you are directed to the right person  Send all requests for admission clinical reviews, approved or denied determinations and any other requests to fax [phone]   All voicemails are confidential

## 2019-01-29 NOTE — PLAN OF CARE
Problem: Potential for Falls  Goal: Patient will remain free of falls  INTERVENTIONS:  - Assess patient frequently for physical needs  -  Identify cognitive and physical deficits and behaviors that affect risk of falls    -  [place] fall precautions as indicated by assessment   - Educate patient/family on patient safety including physical limitations  - Instruct patient to call for assistance with activity based on assessment  - Modify environment to reduce risk of injury  - Consider OT/PT consult to assist with strengthening/mobility    Outcome: Progressing      Problem: METABOLIC, FLUID AND ELECTROLYTES - ADULT  Goal: Glucose maintained within target range  INTERVENTIONS:  - Monitor Blood Glucose as ordered  - Assess for signs and symptoms of hyperglycemia and hypoglycemia  - Administer ordered medications to maintain glucose within target range  - Assess nutritional intake and initiate nutrition service referral as needed   Outcome: Progressing      Problem: SKIN/TISSUE INTEGRITY - ADULT  Goal: Skin integrity remains intact  INTERVENTIONS  - Identify patients at risk for skin breakdown  - Assess and monitor skin integrity  - Assess and monitor nutrition and hydration status  - Monitor labs (i e  albumin)  - Assess for incontinence   - Turn and reposition patient  - Assist with mobility/ambulation  - Relieve pressure over bony prominences  - Avoid friction and shearing  - Provide appropriate hygiene as needed including keeping skin clean and dry  - Evaluate need for skin moisturizer/barrier cream  - Collaborate with interdisciplinary team (i e  Nutrition, Rehabilitation, etc )   - Patient/family teaching   Outcome: Progressing    Goal: Incision(s), wounds(s) or drain site(s) healing without S/S of infection  INTERVENTIONS  - Assess and document risk factors for skin impairment   - Assess and document dressing, incision, wound bed, drain sites and surrounding tissue  - Initiate Nutrition services consult and/or wound management as needed   Outcome: Progressing      Problem: MUSCULOSKELETAL - ADULT  Goal: Maintain or return mobility to safest level of function  INTERVENTIONS:  - Assess patient's ability to carry out ADLs; assess patient's baseline for ADL function and identify physical deficits which impact ability to perform ADLs (bathing, care of mouth/teeth, toileting, grooming, dressing, etc )  - Assess/evaluate cause of self-care deficits   - Assess range of motion  - Assess patient's mobility; develop plan if impaired  - Assess patient's need for assistive devices and provide as appropriate  - Encourage maximum independence but intervene and supervise when necessary  - Involve family in performance of ADLs  - Assess for home care needs following discharge   - Request OT consult to assist with ADL evaluation and planning for discharge  - Provide patient education as appropriate   Outcome: Progressing    Goal: Maintain proper alignment of affected body part  INTERVENTIONS:  - Support, maintain and protect limb and body alignment  - Provide pt/fam with appropriate education   Outcome: Progressing      Problem: PAIN - ADULT  Goal: Verbalizes/displays adequate comfort level or baseline comfort level  Interventions:  - Encourage patient to monitor pain and request assistance  - Assess pain using appropriate pain scale  - Administer analgesics based on type and severity of pain and evaluate response  - Implement non-pharmacological measures as appropriate and evaluate response  - Consider cultural and social influences on pain and pain management  - Notify physician/advanced practitioner if interventions unsuccessful or patient reports new pain   Outcome: Progressing      Problem: INFECTION - ADULT  Goal: Absence or prevention of progression during hospitalization  INTERVENTIONS:  - Assess and monitor for signs and symptoms of infection  - Monitor lab/diagnostic results  - Monitor all insertion sites, i e  indwelling lines, tubes, and drains  - Monitor endotracheal (as able) and nasal secretions for changes in amount and color  - [place] appropriate cooling/warming therapies per order  - Administer medications as ordered  - Instruct and encourage patient and family to use good hand hygiene technique  - Identify and instruct in appropriate isolation precautions for identified infection/condition   Outcome: Progressing      Problem: SAFETY ADULT  Goal: Patient will remain free of falls  INTERVENTIONS:  - Assess patient frequently for physical needs  -  Identify cognitive and physical deficits and behaviors that affect risk of falls  -  [place] fall precautions as indicated by assessment   - Educate patient/family on patient safety including physical limitations  - Instruct patient to call for assistance with activity based on assessment  - Modify environment to reduce risk of injury  - Consider OT/PT consult to assist with strengthening/mobility    Outcome: Progressing      Problem: DISCHARGE PLANNING  Goal: Discharge to home or other facility with appropriate resources  INTERVENTIONS:  - Identify barriers to discharge w/patient and caregiver  - Arrange for needed discharge resources and transportation as appropriate  - Identify discharge learning needs (meds, wound care, etc )  - Arrange for interpretive services to assist at discharge as needed  - Refer to Case Management Department for coordinating discharge planning if the patient needs post-hospital services based on physician/advanced practitioner order or complex needs related to functional status, cognitive ability, or social support system   Outcome: Progressing      Problem: Knowledge Deficit  Goal: Patient/family/caregiver demonstrates understanding of disease process, treatment plan, medications, and discharge instructions  Complete learning assessment and assess knowledge base    Interventions:  - Provide teaching at level of understanding  - Provide teaching via preferred learning methods   Outcome: Progressing      Problem: Nutrition/Hydration-ADULT  Goal: Nutrient/Hydration intake appropriate for improving, restoring or maintaining nutritional needs  Monitor and assess patient's nutrition/hydration status for malnutrition (ex- brittle hair, bruises, dry skin, pale skin and conjunctiva, muscle wasting, smooth red tongue, and disorientation)  Collaborate with interdisciplinary team and initiate plan and interventions as ordered  Monitor patient's weight and dietary intake as ordered or per policy  Utilize nutrition screening tool and intervene per policy  Determine patient's food preferences and provide high-protein, high-caloric foods as appropriate       INTERVENTIONS:  - Monitor oral intake, urinary output, labs, and treatment plans  - Assess nutrition and hydration status and recommend course of action  - Evaluate amount of meals eaten  - Assist patient with eating if necessary   - Allow adequate time for meals  - Recommend/ encourage appropriate diets, oral nutritional supplements, and vitamin/mineral supplements  - Order, calculate, and assess calorie counts as needed  - Recommend, monitor, and adjust tube feedings and TPN/PPN based on assessed needs  - Assess need for intravenous fluids  - Provide specific nutrition/hydration education as appropriate  - Include patient/family/caregiver in decisions related to nutrition   Outcome: Progressing

## 2019-01-29 NOTE — SOCIAL WORK
Continue to follow  Spoke with Julia at [facility], [address] has no deductible and services are covered 100%  Pt informed that he has no deductible and services for [facility] are covered at 100%  Pt in agreement

## 2019-01-29 NOTE — PHYSICAL THERAPY NOTE
PT tx     01/29/19 1505   Pain Assessment   Pain Assessment 0-10   Pain Score 4   Restrictions/Precautions   LLE Weight Bearing Per Order WBAT   General   Chart Reviewed Yes   Cognition   Orientation Level Oriented X4   Subjective   Subjective agrees to mobilize   Bed Mobility   Supine to Sit 5  Supervision   Sit to Supine 5  Supervision   Transfers   Sit to Stand 5  Supervision   Stand to Sit 5  Supervision   Stand pivot 5  Supervision   Ambulation/Elevation   Gait pattern Improper Weight shift  (decreased heel strike L at times)   Gait Assistance 5  Supervision   Additional items Verbal cues   Assistive Device Rolling walker   Distance 150'x2   Stair Management Assistance 5  Supervision   Additional items Verbal cues   Stair Management Technique Step to pattern; Two rails; Foreward;Nonreciprocal   Number of Stairs 5   Balance   Static Sitting Good   Dynamic Sitting Good   Static Standing Fair +   Dynamic Standing Fair +   Ambulatory Fair +   Endurance Deficit   Endurance Deficit No   Activity Tolerance   Activity Tolerance Patient tolerated treatment well   Nurse Made Aware yes   Exercises   TKR Supine;10 reps;AROM; Left   Assessment   Prognosis Good   Problem List Decreased strength;Decreased range of motion   Assessment Ynes well with step training  REady for d/c tomorrow if medically cleraed  con't PT as daniel   Barriers to Discharge (medical status)   Goals   Patient Goals go home get off ivf   Plan   Treatment/Interventions ADL retraining;Functional transfer training;LE strengthening/ROM; Elevations; Therapeutic exercise;Patient/family training;Equipment eval/education;Gait training;Spoke to nursing;Spoke to case management   Progress Progressing toward goals   PT Frequency 5x/wk; Twice a day   Nichole Blackman, PT

## 2019-01-29 NOTE — SOCIAL WORK
Met with Pt  Pt's wife at bedside  Discussed role of   [place]  Pt is independent with adls and ambulation  Pt's wife does grocery shopping  Pt's wife and Pt drives  Pt goes to [facility] on [address]  Pt and Pt's wife requesting home care upon discharge  Freedom of Choice given  Pt and Pt's wife requesting [facility]  Pt also inquiring about copay for home care services  Referral sent to [facility] via [facility]  Await return call from [facility] re: if there is a copay for home care services  Will follow

## 2019-01-29 NOTE — PHYSICAL THERAPY NOTE
PT tx     01/29/19 1130   Pain Assessment   Pain Assessment No/denies pain   Pain Score No Pain   Restrictions/Precautions   LLE Weight Bearing Per Order WBAT   General   Chart Reviewed Yes   Additional Pertinent History continues on IVf for renal function and low bp   Cognition   Overall Cognitive Status WFL   Orientation Level Oriented X4   Subjective   Subjective Anxious to progress   Bed Mobility   Supine to Sit 5  Supervision   Sit to Supine 5  Supervision   Transfers   Sit to Stand 4  Minimal assistance   Additional items Assist x 1   Stand to Sit 4  Minimal assistance   Additional items Assist x 1   Stand pivot 4  Minimal assistance   Additional items Assist x 1   Ambulation/Elevation   Gait pattern Improper Weight shift; Foward flexed; Short stride  (decreased heel strike L)   Gait Assistance 4  Minimal assist   Additional items Assist x 1; Tactile cues   Assistive Device Rolling walker   Distance 30',80'   Balance   Static Standing Fair +   Dynamic Standing Fair   Ambulatory Fair   Endurance Deficit   Endurance Deficit No   Activity Tolerance   Activity Tolerance Patient tolerated treatment well   Nurse Made Aware yes   Exercises   Knee PROM Flexion 5 reps; Sitting;Left  (to 89 degrees)   Knee PROM Extension (-5 of full extension)   Knee AROM Long Arc Quad Sitting;5 reps;AAROM; Left   TKR Supine;Left;10 reps;AAROM   Assessment   Prognosis Good   Problem List Decreased strength;Decreased range of motion;Decreased mobility   Assessment Pt progressing well with gait and ther ex  continues with IVF  Minimal pain  Cont BID   Barriers to Discharge None   Goals   Patient Goals get better  go home   Plan   Treatment/Interventions LE strengthening/ROM; Elevations; Therapeutic exercise;Patient/family training;Equipment eval/education;Gait training;Spoke to nursing;Spoke to case management   Progress Improving as expected   PT Frequency 5x/wk; Twice a day   Recommendation   Recommendation Home with family support;Home PT Equipment Recommended Kesha Connors   PT - OK to Discharge Julieth Nguyễn PT

## 2019-01-29 NOTE — NURSING NOTE
Upon initial assessment, I noticed some breakthrough bleeding on his Ace overwrap  I reinforced with two 4x4 gauze dressing pads underneath the outer layer of Ace wrap  [facility]

## 2019-01-29 NOTE — PLAN OF CARE
Problem: Potential for Falls  Goal: Patient will remain free of falls  INTERVENTIONS:  - Assess patient frequently for physical needs  -  Identify cognitive and physical deficits and behaviors that affect risk of falls    -  [place] fall precautions as indicated by assessment   - Educate patient/family on patient safety including physical limitations  - Instruct patient to call for assistance with activity based on assessment  - Modify environment to reduce risk of injury  - Consider OT/PT consult to assist with strengthening/mobility    Outcome: Progressing      Problem: METABOLIC, FLUID AND ELECTROLYTES - ADULT  Goal: Glucose maintained within target range  INTERVENTIONS:  - Monitor Blood Glucose as ordered  - Assess for signs and symptoms of hyperglycemia and hypoglycemia  - Administer ordered medications to maintain glucose within target range  - Assess nutritional intake and initiate nutrition service referral as needed   Outcome: Progressing      Problem: SKIN/TISSUE INTEGRITY - ADULT  Goal: Skin integrity remains intact  INTERVENTIONS  - Identify patients at risk for skin breakdown  - Assess and monitor skin integrity  - Assess and monitor nutrition and hydration status  - Monitor labs (i e  albumin)  - Assess for incontinence   - Turn and reposition patient  - Assist with mobility/ambulation  - Relieve pressure over bony prominences  - Avoid friction and shearing  - Provide appropriate hygiene as needed including keeping skin clean and dry  - Evaluate need for skin moisturizer/barrier cream  - Collaborate with interdisciplinary team (i e  Nutrition, Rehabilitation, etc )   - Patient/family teaching   Outcome: Progressing    Goal: Incision(s), wounds(s) or drain site(s) healing without S/S of infection  INTERVENTIONS  - Assess and document risk factors for skin impairment   - Assess and document dressing, incision, wound bed, drain sites and surrounding tissue  - Initiate Nutrition services consult and/or wound management as needed   Outcome: Progressing      Problem: MUSCULOSKELETAL - ADULT  Goal: Maintain or return mobility to safest level of function  INTERVENTIONS:  - Assess patient's ability to carry out ADLs; assess patient's baseline for ADL function and identify physical deficits which impact ability to perform ADLs (bathing, care of mouth/teeth, toileting, grooming, dressing, etc )  - Assess/evaluate cause of self-care deficits   - Assess range of motion  - Assess patient's mobility; develop plan if impaired  - Assess patient's need for assistive devices and provide as appropriate  - Encourage maximum independence but intervene and supervise when necessary  - Involve family in performance of ADLs  - Assess for home care needs following discharge   - Request OT consult to assist with ADL evaluation and planning for discharge  - Provide patient education as appropriate   Outcome: Progressing    Goal: Maintain proper alignment of affected body part  INTERVENTIONS:  - Support, maintain and protect limb and body alignment  - Provide pt/fam with appropriate education   Outcome: Progressing      Problem: PAIN - ADULT  Goal: Verbalizes/displays adequate comfort level or baseline comfort level  Interventions:  - Encourage patient to monitor pain and request assistance  - Assess pain using appropriate pain scale  - Administer analgesics based on type and severity of pain and evaluate response  - Implement non-pharmacological measures as appropriate and evaluate response  - Consider cultural and social influences on pain and pain management  - Notify physician/advanced practitioner if interventions unsuccessful or patient reports new pain   Outcome: Progressing      Problem: INFECTION - ADULT  Goal: Absence or prevention of progression during hospitalization  INTERVENTIONS:  - Assess and monitor for signs and symptoms of infection  - Monitor lab/diagnostic results  - Monitor all insertion sites, i e  indwelling lines, tubes, and drains  - Monitor endotracheal (as able) and nasal secretions for changes in amount and color  - [place] appropriate cooling/warming therapies per order  - Administer medications as ordered  - Instruct and encourage patient and family to use good hand hygiene technique  - Identify and instruct in appropriate isolation precautions for identified infection/condition   Outcome: Progressing      Problem: SAFETY ADULT  Goal: Patient will remain free of falls  INTERVENTIONS:  - Assess patient frequently for physical needs  -  Identify cognitive and physical deficits and behaviors that affect risk of falls  -  [place] fall precautions as indicated by assessment   - Educate patient/family on patient safety including physical limitations  - Instruct patient to call for assistance with activity based on assessment  - Modify environment to reduce risk of injury  - Consider OT/PT consult to assist with strengthening/mobility    Outcome: Progressing      Problem: DISCHARGE PLANNING  Goal: Discharge to home or other facility with appropriate resources  INTERVENTIONS:  - Identify barriers to discharge w/patient and caregiver  - Arrange for needed discharge resources and transportation as appropriate  - Identify discharge learning needs (meds, wound care, etc )  - Arrange for interpretive services to assist at discharge as needed  - Refer to Case Management Department for coordinating discharge planning if the patient needs post-hospital services based on physician/advanced practitioner order or complex needs related to functional status, cognitive ability, or social support system   Outcome: Progressing      Problem: Knowledge Deficit  Goal: Patient/family/caregiver demonstrates understanding of disease process, treatment plan, medications, and discharge instructions  Complete learning assessment and assess knowledge base    Interventions:  - Provide teaching at level of understanding  - Provide teaching via preferred learning methods   Outcome: Progressing      Problem: Nutrition/Hydration-ADULT  Goal: Nutrient/Hydration intake appropriate for improving, restoring or maintaining nutritional needs  Monitor and assess patient's nutrition/hydration status for malnutrition (ex- brittle hair, bruises, dry skin, pale skin and conjunctiva, muscle wasting, smooth red tongue, and disorientation)  Collaborate with interdisciplinary team and initiate plan and interventions as ordered  Monitor patient's weight and dietary intake as ordered or per policy  Utilize nutrition screening tool and intervene per policy  Determine patient's food preferences and provide high-protein, high-caloric foods as appropriate       INTERVENTIONS:  - Monitor oral intake, urinary output, labs, and treatment plans  - Assess nutrition and hydration status and recommend course of action  - Evaluate amount of meals eaten  - Assist patient with eating if necessary   - Allow adequate time for meals  - Recommend/ encourage appropriate diets, oral nutritional supplements, and vitamin/mineral supplements  - Order, calculate, and assess calorie counts as needed  - Recommend, monitor, and adjust tube feedings and TPN/PPN based on assessed needs  - Assess need for intravenous fluids  - Provide specific nutrition/hydration education as appropriate  - Include patient/family/caregiver in decisions related to nutrition   Outcome: Progressing

## 2019-01-29 NOTE — PROGRESS NOTES
John Chula  68 y o   male  MR#: [id number]  1/29/2019    Post-op days: 1  Extremity: left knee    Subjective: Patient seen and examined  Lying comfortably in bed  Pain is well controlled at this time  Denies chest pain, SOB, nausea, vomiting, fever or chills  Vitals:   Vitals:    01/28/19 2250   BP: 116/55   Pulse: 74   Resp: 18   Temp: 97 7 °F (36 5 °C)   SpO2: 96%       Exam:   A&O x 3 NAD  Left knee:  Dressing clean/dry/intact  Area over distal aspect of incision has been reinforced  Calf and thigh soft, compressible  NV intact      Labs:   WBC   Recent Labs      01/29/19   0458   WBC  9 15     H/H   Recent Labs      01/29/19   0458   HGB  10 0*   /  Recent Labs      01/29/19   0458   HCT  31 2*     Sed Rate No results for input(s): SEDRATE in the last 72 hours  CRP No results for input(s): CRP in the last 72 hours  Assessment:   S/p left TKA    Plan:   WBAT to LLE with assistance  PT/OT  DVT prophylaxis: Lovenox and foot pumps  Pain control  Encourage incentive spirometry  ABLA- stable   Will monitor vitals and H/H   DC planning

## 2019-01-29 NOTE — PROGRESS NOTES
Progress Note - Vickie Gonzalez 1945, 68 y o  male MRN: [id number]    Unit/Bed#: [address] [place] [address] Encounter: [id number]    Primary Care Provider: Yoni Plasencia DO   Date and time admitted to hospital: 2019  6:57 AM        Essential hypertension   Assessment & Plan    Continue Cozaar and Toprol-XL with hold parameters to avoid hypotension  Patient still with ongoing low blood pressures will hold Aldactone  Monitor blood pressure with routine vitals  Agree with bolus for symptomatic hypotension       Type 2 diabetes mellitus without complication, without long-term current use of insulin (HCC)   Assessment & Plan    Continue sliding scale coverage  Continue diabetic modified diet     * Primary localized osteoarthritis of left knee   Assessment & Plan    Status post arthroplasty postop day 1  Defer management of this to primary team         VTE Pharmacologic Prophylaxis:   Pharmacologic:    Mechanical VTE Prophylaxis in Place: No    Patient Centered Rounds: I have performed bedside rounds with nursing staff today  Discussions with Specialists or Other Care Team Provider:      Education and Discussions with Family / Patient:      Time Spent for Care: 15 minutes  More than 50% of total time spent on counseling and coordination of care as described above  Current Length of Stay: 1 day(s)    Current Patient Status: Inpatient   Certification Statement: Defer to primary team    Discharge Plan:      Code Status: Level 1 - Full Code      Subjective:   Overnight patient with some lightheadedness orthostatic in nature denies loss of consciousness focal numbness weakness chest pain shortness of breath  Objective:     Vitals:   Temp (24hrs), Av 8 °F (36 6 °C), Min:97 4 °F (36 3 °C), Max:99 1 °F (37 3 °C)    Temp:  [97 4 °F (36 3 °C)-99 1 °F (37 3 °C)] 99 1 °F (37 3 °C)  HR:  [64-85] 85  Resp:  [12-21] 18  BP: ()/(52-69) 93/52  SpO2:  [96 %-100 %] 97 %  Body mass index is 24 55 kg/m²       Input and Output Summary (last 24 hours): Intake/Output Summary (Last 24 hours) at 01/29/19 0935  Last data filed at 01/29/19 8278   Gross per 24 hour   Intake             2190 ml   Output              800 ml   Net             1390 ml       Physical Exam:     Physical Exam   Constitutional: He is oriented to person, place, and time  He appears well-developed and well-nourished  No distress  HENT:   Head: Normocephalic and atraumatic  Right Ear: External ear normal    Left Ear: External ear normal    Nose: Nose normal    Eyes: Conjunctivae are normal  Right eye exhibits no discharge  Left eye exhibits no discharge  No scleral icterus  Neck: Normal range of motion  Neck supple  No JVD present  No tracheal deviation present  No thyromegaly present  Cardiovascular: Normal rate, regular rhythm, normal heart sounds and intact distal pulses  Exam reveals no gallop and no friction rub  No murmur heard  Pulmonary/Chest: Breath sounds normal  No stridor  No respiratory distress  He has no wheezes  He has no rales  Abdominal: Soft  Bowel sounds are normal  He exhibits no distension  There is no tenderness  There is no rebound and no guarding  Musculoskeletal: Normal range of motion  He exhibits no edema, tenderness or deformity  Left lower extremity in Ace bandage   Neurological: He is alert and oriented to person, place, and time  He exhibits normal muscle tone  Coordination normal    Skin: Skin is warm and dry  No rash noted  He is not diaphoretic  No erythema  No pallor  Psychiatric: He has a normal mood and affect  His behavior is normal  Judgment and thought content normal    Nursing note and vitals reviewed          Additional Data:     Labs:      Results from last 7 days  Lab Units 01/29/19  0458   WBC Thousand/uL 9 15   HEMOGLOBIN g/dL 10 0*   HEMATOCRIT % 31 2*   PLATELETS Thousands/uL 232       Results from last 7 days  Lab Units 01/29/19  0458   SODIUM mmol/L 140   POTASSIUM mmol/L 4 4   CHLORIDE mmol/L 103   CO2 mmol/L 29   BUN mg/dL 26*   CREATININE mg/dL 1 44*   ANION GAP mmol/L 8   CALCIUM mg/dL 8 5   GLUCOSE RANDOM mg/dL 110           Results from last 7 days  Lab Units 01/29/19  0621 01/28/19  2110 01/28/19  1701 01/28/19  1110 01/28/19  0720   POC GLUCOSE mg/dl 112 178* 157* 112 129                   * I Have Reviewed All Lab Data Listed Above  * Additional Pertinent Lab Tests Reviewed:  [place] [facility] Admission Reviewed    Imaging:    Imaging Reports Reviewed Today Include:    Imaging Personally Reviewed by Myself Includes:       Recent Cultures (last 7 days):           Last 24 Hours Medication List:     Current Facility-Administered Medications:  acetaminophen 650 mg Oral Q6H PRN Jessenia Prom, PA-C    aluminum-magnesium hydroxide-simethicone 15 mL Oral Q6H PRN Jessenia Prom, PA-C    ascorbic acid 500 mg Oral BID Jessenia Prom, PA-C    aspirin 325 mg Oral Daily Jessenia Prom, PA-C    bisacodyl 10 mg Rectal Daily PRN Jessenia Prom, PA-C    calcium carbonate 1,000 mg Oral Daily PRN Jessenia Prom, PA-C    docusate sodium 100 mg Oral BID Jessenia Prom, PA-C    enoxaparin 30 mg Subcutaneous Q12H [address] Jessenia Prom, PA-C    ferrous sulfate 325 mg Oral BID With Meals Jessenia Prom, PA-C    folic acid 1 mg Oral Daily Jessenia Prom, PA-C    gabapentin 100 mg Oral [facility] Jessenia Prom, PA-C    insulin lispro 1-5 Units Subcutaneous TID [facility] April D DUNG Russell    insulin lispro 1-5 Units Subcutaneous HS April D DUNG Russell    lactated ringers 1,000 mL Intravenous Once Jessenia Prom, PA-C Last Rate: 1,000 mL (01/29/19 0851)   lactated ringers 75 mL/hr Intravenous Continuous Jessenia Prom, PA-C Last Rate: Stopped (01/29/19 0851)   losartan 100 mg Oral Daily Jessenia Prom, PA-C    metoprolol succinate 100 mg Oral Daily April D DUNG Russell    morphine injection 2 mg Intravenous Q4H PRN Jessenia Prom, PA-C    multivitamin-minerals 1 tablet Oral Daily Jessenia Prom, AWA    ondansetron 4 mg Intravenous Q4H PRN Jesseniabrennon Alfred, PARiveraC oxyCODONE-acetaminophen 1 tablet Oral Q4H PRN Jo Blood, PA-C    oxyCODONE-acetaminophen 2 tablet Oral Q4H PRN Jo Blood, PA-C    pantoprazole 40 mg Oral Early Morning Jo Blood, PA-C    pravastatin 40 mg Oral HS Jo Blood, PA-C    senna 1 tablet Oral Daily Jo Blood, PA-C    simethicone 80 mg Oral 4x Daily PRN Jo Blood, PA-C         Today, Patient Was Seen By: Oscar Aiken MD    ** Please Note: Dictation voice to text software may have been used in the creation of this document   **

## 2019-01-30 LAB
ANION GAP SERPL CALCULATED.3IONS-SCNC: 8 MMOL/L (ref 4–13)
BUN SERPL-MCNC: 19 MG/DL (ref 5–25)
CALCIUM SERPL-MCNC: 9 MG/DL (ref 8.3–10.1)
CHLORIDE SERPL-SCNC: 103 MMOL/L (ref 100–108)
CO2 SERPL-SCNC: 28 MMOL/L (ref 21–32)
CREAT SERPL-MCNC: 1.31 MG/DL (ref 0.6–1.3)
ERYTHROCYTE [DISTWIDTH] IN BLOOD BY AUTOMATED COUNT: 14.9 % (ref 11.6–15.1)
GFR SERPL CREATININE-BSD FRML MDRD: 54 ML/MIN/1.73SQ M
GLUCOSE SERPL-MCNC: 110 MG/DL (ref 65–140)
GLUCOSE SERPL-MCNC: 119 MG/DL (ref 65–140)
GLUCOSE SERPL-MCNC: 134 MG/DL (ref 65–140)
GLUCOSE SERPL-MCNC: 151 MG/DL (ref 65–140)
GLUCOSE SERPL-MCNC: 168 MG/DL (ref 65–140)
HCT VFR BLD AUTO: 26.6 % (ref 36.5–49.3)
HGB BLD-MCNC: 8.7 G/DL (ref 12–17)
MCH RBC QN AUTO: 28.7 PG (ref 26.8–34.3)
MCHC RBC AUTO-ENTMCNC: 32.7 G/DL (ref 31.4–37.4)
MCV RBC AUTO: 88 FL (ref 82–98)
PLATELET # BLD AUTO: 194 THOUSANDS/UL (ref 149–390)
PMV BLD AUTO: 10.4 FL (ref 8.9–12.7)
POTASSIUM SERPL-SCNC: 3.9 MMOL/L (ref 3.5–5.3)
RBC # BLD AUTO: 3.03 MILLION/UL (ref 3.88–5.62)
SODIUM SERPL-SCNC: 139 MMOL/L (ref 136–145)
WBC # BLD AUTO: 8.42 THOUSAND/UL (ref 4.31–10.16)

## 2019-01-30 PROCEDURE — 85027 COMPLETE CBC AUTOMATED: CPT | Performed by: PHYSICIAN ASSISTANT

## 2019-01-30 PROCEDURE — 82948 REAGENT STRIP/BLOOD GLUCOSE: CPT

## 2019-01-30 PROCEDURE — 97110 THERAPEUTIC EXERCISES: CPT

## 2019-01-30 PROCEDURE — 97166 OT EVAL MOD COMPLEX 45 MIN: CPT

## 2019-01-30 PROCEDURE — 99232 SBSQ HOSP IP/OBS MODERATE 35: CPT | Performed by: INTERNAL MEDICINE

## 2019-01-30 PROCEDURE — 80048 BASIC METABOLIC PNL TOTAL CA: CPT | Performed by: PHYSICIAN ASSISTANT

## 2019-01-30 PROCEDURE — G8989 SELF CARE D/C STATUS: HCPCS

## 2019-01-30 PROCEDURE — 99024 POSTOP FOLLOW-UP VISIT: CPT | Performed by: PHYSICIAN ASSISTANT

## 2019-01-30 PROCEDURE — 97116 GAIT TRAINING THERAPY: CPT

## 2019-01-30 PROCEDURE — G8987 SELF CARE CURRENT STATUS: HCPCS

## 2019-01-30 RX ORDER — ASPIRIN 325 MG
325 TABLET ORAL 2 TIMES DAILY
Qty: 60 TABLET | Refills: 0 | Status: SHIPPED | OUTPATIENT
Start: 2019-01-30 | End: 2019-03-29 | Stop reason: SDUPTHER

## 2019-01-30 RX ORDER — OXYCODONE HYDROCHLORIDE AND ACETAMINOPHEN 5; 325 MG/1; MG/1
1 TABLET ORAL EVERY 4 HOURS PRN
Qty: 60 TABLET | Refills: 0 | Status: SHIPPED | OUTPATIENT
Start: 2019-01-30 | End: 2019-02-09

## 2019-01-30 RX ADMIN — PRAVASTATIN SODIUM 40 MG: 40 TABLET ORAL at 21:52

## 2019-01-30 RX ADMIN — FOLIC ACID 1 MG: 1 TABLET ORAL at 08:19

## 2019-01-30 RX ADMIN — DOCUSATE SODIUM 100 MG: 100 CAPSULE, LIQUID FILLED ORAL at 17:36

## 2019-01-30 RX ADMIN — PANTOPRAZOLE SODIUM 40 MG: 40 TABLET, DELAYED RELEASE ORAL at 05:26

## 2019-01-30 RX ADMIN — ENOXAPARIN SODIUM 30 MG: 30 INJECTION SUBCUTANEOUS at 21:52

## 2019-01-30 RX ADMIN — OXYCODONE HYDROCHLORIDE AND ACETAMINOPHEN 500 MG: 500 TABLET ORAL at 17:36

## 2019-01-30 RX ADMIN — FERROUS SULFATE TAB 325 MG (65 MG ELEMENTAL FE) 325 MG: 325 (65 FE) TAB at 08:19

## 2019-01-30 RX ADMIN — OXYCODONE HYDROCHLORIDE AND ACETAMINOPHEN 2 TABLET: 5; 325 TABLET ORAL at 15:19

## 2019-01-30 RX ADMIN — OXYCODONE HYDROCHLORIDE AND ACETAMINOPHEN 500 MG: 500 TABLET ORAL at 08:19

## 2019-01-30 RX ADMIN — ASPIRIN 325 MG ORAL TABLET 325 MG: 325 PILL ORAL at 08:19

## 2019-01-30 RX ADMIN — GABAPENTIN 100 MG: 100 CAPSULE ORAL at 21:52

## 2019-01-30 RX ADMIN — Medication 1 TABLET: at 08:19

## 2019-01-30 RX ADMIN — ENOXAPARIN SODIUM 30 MG: 30 INJECTION SUBCUTANEOUS at 08:19

## 2019-01-30 RX ADMIN — METOPROLOL SUCCINATE 100 MG: 50 TABLET, EXTENDED RELEASE ORAL at 08:22

## 2019-01-30 RX ADMIN — GABAPENTIN 100 MG: 100 CAPSULE ORAL at 13:30

## 2019-01-30 RX ADMIN — FERROUS SULFATE TAB 325 MG (65 MG ELEMENTAL FE) 325 MG: 325 (65 FE) TAB at 17:35

## 2019-01-30 RX ADMIN — OXYCODONE HYDROCHLORIDE AND ACETAMINOPHEN 2 TABLET: 5; 325 TABLET ORAL at 05:28

## 2019-01-30 RX ADMIN — INSULIN LISPRO 1 UNITS: 100 INJECTION, SOLUTION INTRAVENOUS; SUBCUTANEOUS at 11:34

## 2019-01-30 RX ADMIN — INSULIN LISPRO 1 UNITS: 100 INJECTION, SOLUTION INTRAVENOUS; SUBCUTANEOUS at 17:35

## 2019-01-30 RX ADMIN — STANDARDIZED SENNA CONCENTRATE 8.6 MG: 8.6 TABLET ORAL at 08:19

## 2019-01-30 RX ADMIN — GABAPENTIN 100 MG: 100 CAPSULE ORAL at 05:26

## 2019-01-30 RX ADMIN — SODIUM CHLORIDE, SODIUM LACTATE, POTASSIUM CHLORIDE, AND CALCIUM CHLORIDE 75 ML/HR: .6; .31; .03; .02 INJECTION, SOLUTION INTRAVENOUS at 01:13

## 2019-01-30 NOTE — PHYSICAL THERAPY NOTE
PT tx     01/30/19 1530   Pain Assessment   Pain Assessment 0-10   Pain Score 6   Pain Type Surgical pain   Pain Location Knee   Pain Orientation Left   Restrictions/Precautions   LLE Weight Bearing Per Order WBAT   Exercises   Knee AROM 10 reps; Supine;AAROM  (5-80 degrees near full extension after stretch)   TKR Supine;AAROM; Left;10 reps   Assessment   Prognosis Good   Problem List Decreased strength;Decreased range of motion   Assessment Good progress wants pain pill will amb later once pain controlled     Barriers to Discharge None   Goals   Patient Goals go home   Plan   Treatment/Interventions Therapeutic exercise   Recommendation   Recommendation Home with family support;Home PT   PT - OK to Discharge Yes   Kyree Check, PT

## 2019-01-30 NOTE — PHYSICAL THERAPY NOTE
PT tx     01/30/19 1040   Pain Assessment   Pain Assessment No/denies pain   Pain Score No Pain   Restrictions/Precautions   Weight Bearing Precautions Per Order Yes   LLE Weight Bearing Per Order WBAT   General   Chart Reviewed Yes   Additional Pertinent History dressing changed some swelling L knee, slight flexion   Cognition   Overall Cognitive Status WFL   Arousal/Participation Alert   Attention Within functional limits   Orientation Level Oriented X4   Memory Within functional limits   Following Commands Follows all commands and directions without difficulty   Subjective   Subjective staying until tomorrow AM had slight temp   Transfers   Sit to Stand 5  Supervision   Stand to Sit 5  Supervision   Stand pivot 5  Supervision   Ambulation/Elevation   Gait pattern Forward Flexion; Inconsistent jeremiah; Improper Weight shift   Gait Assistance 5  Supervision   Additional items Verbal cues   Assistive Device Rolling walker   Distance 150'x2   Stair Management Assistance 5  Supervision   Additional items Verbal cues   Stair Management Technique Two rails; Foreward;Nonreciprocal   Number of Stairs 2   Balance   Static Sitting Good   Dynamic Sitting Good   Static Standing Fair +   Dynamic Standing Fair +   Ambulatory Fair +   Endurance Deficit   Endurance Deficit No   Activity Tolerance   Activity Tolerance Patient tolerated treatment well   Nurse Made Aware yes   Exercises   Knee PROM Flexion 5 reps; Sitting;AROM; Left   Knee AROM Long Arc Quad 10 reps; Sitting;AROM; Left   Ankle Pumps Right;Left;10 reps; Sitting   Assessment   Prognosis Good   Problem List Decreased strength;Decreased range of motion;Decreased endurance; Impaired balance;Decreased mobility; Decreased coordination;Decreased safety awareness   Assessment Pt Doing well with mobility adn ther ex   Will see in pm and hope to d/c in AM 1/31   Barriers to Discharge (medical status)   Goals   Patient Goals go home   Plan   Treatment/Interventions ADL retraining;Functional transfer training; Therapeutic exercise;Elevations;LE strengthening/ROM; Endurance training;Patient/family training;Equipment eval/education; Bed mobility;Gait training;Spoke to nursing;Spoke to case management;OT   Progress Improving as expected   PT Frequency 5x/wk; Twice a day   Recommendation   Recommendation Home with family support;Home PT   Equipment Recommended Walker   PT - OK to Discharge Yes   Greg Helms, PT

## 2019-01-30 NOTE — PROGRESS NOTES
Jake Farmer  68 y o   male  MR#: [id number]  1/30/2019    Post-op days: 2  Extremity: left knee    Subjective: Patient seen and examined  Lying comfortably in bed  Pain is well controlled at this time  Has had a BM daily postop  Walked around [facility] with PT yesterday  Vitals:   Vitals:    01/30/19 0730   BP: 108/59   Pulse: 90   Resp: 18   Temp: 98 3 °F (36 8 °C)   SpO2: 95%       Exam:   A&O x 3 NAD  Left knee:  Dressing clean/dry/intact  Dressing taken down: Healing and intact incision with no evidence of infection  Calf and thigh soft, compressible  NV intact      Labs:   WBC   Recent Labs      01/29/19   0458  01/30/19   0459   WBC  9 15  8 42     H/H   Recent Labs      01/29/19   0458  01/30/19   0459   HGB  10 0*  8 7*   /  Recent Labs      01/29/19 0458  01/30/19 0459   HCT  31 2*  26 6*     Sed Rate No results for input(s): SEDRATE in the last 72 hours  CRP No results for input(s): CRP in the last 72 hours  Assessment:   S/p left TKA    Plan:   WBAT to LLE with assistance  PT/OT  DVT prophylaxis: Lovenox and foot pumps  Will discharge home on  mg BID x 30 days  Pain control  Encourage incentive spirometry  ABLA- stable  Will monitor vitals and H/H  Discharge home tomorrow morning  Follow up in office with Dr Felicia Cervantes in 10-14 days  Follow up with PCP in one week to discuss restarting diuretic

## 2019-01-30 NOTE — DISCHARGE INSTRUCTIONS
1  WBAT to LLE with assistance  2  Continue PT/OT  3  Pain medication as needed  4  Continue DVT prophylaxis as prescribed   mg BID x 30 days  5  May shower POD #4  Daily dressing change with gauze and tape  6  Follow up in office in 10-14 days  7  Follow up with PCP in one week to check blood pressure and to discuss home medications

## 2019-01-30 NOTE — PROGRESS NOTES
Progress Note - Cierra Garcia 1945, 68 y o  male MRN: [id number]    Unit/Bed#: [address] [place] [address] Encounter: [id number]    Primary Care Provider: Amari Foster DO   Date and time admitted to hospital: 2019  6:57 AM        Essential hypertension   Assessment & Plan    Continue Cozaar and Toprol-XL with hold parameters to avoid hypotension  Continue to hold Aldactone secondary to low blood pressures  No longer with symptomatic hypotension  Monitor blood pressure with routine vitals       Type 2 diabetes mellitus without complication, without long-term current use of insulin (HCC)   Assessment & Plan    Continue sliding scale coverage  Continue diabetic modified diet     * Primary localized osteoarthritis of left knee   Assessment & Plan    Status post arthroplasty postop day 2  Defer management of this to primary team           VTE Pharmacologic Prophylaxis:   Pharmacologic:    Mechanical VTE Prophylaxis in Place: Yes    Patient Centered Rounds: I have performed bedside rounds with nursing staff today  Discussions with Specialists or Other Care Team Provider:      Education and Discussions with Family / Patient:      Time Spent for Care: 20 minutes  More than 50% of total time spent on counseling and coordination of care as described above  Current Length of Stay: 2 day(s)    Current Patient Status: Inpatient   Certification Statement:      Discharge Plan:      Code Status: Level 1 - Full Code      Subjective:   Overnight patient had a single temperature elevation of 100 0 0° F  Patient denies shortness of breath cough is denies urinary complaints including dysuria increased frequency urgency change in color or odor of urine  Patient denies chest pain shortness of breath postoperative pain is well controlled by medication    Denies  paresthesias distal to operative site    Objective:     Vitals:   Temp (24hrs), Av 7 °F (37 1 °C), Min:97 9 °F (36 6 °C), Max:100 °F (37 8 °C)    Temp:  [97 9 °F (36 6 °C)-100 °F (37 8 °C)] 98 3 °F (36 8 °C)  HR:  [] 90  Resp:  [18-19] 18  BP: (102-138)/(51-66) 108/59  SpO2:  [95 %-99 %] 95 %  Body mass index is 24 55 kg/m²  Input and Output Summary (last 24 hours): Intake/Output Summary (Last 24 hours) at 01/30/19 1043  Last data filed at 01/30/19 1061   Gross per 24 hour   Intake                0 ml   Output             2825 ml   Net            -2825 ml       Physical Exam:     Physical Exam   Constitutional: He is oriented to person, place, and time  He appears well-developed and well-nourished  No distress  HENT:   Head: Normocephalic and atraumatic  Right Ear: External ear normal    Left Ear: External ear normal    Nose: Nose normal    Eyes: Conjunctivae are normal  Right eye exhibits no discharge  Left eye exhibits no discharge  No scleral icterus  Neck: Normal range of motion  No JVD present  No tracheal deviation present  No thyromegaly present  Cardiovascular: Normal rate, regular rhythm, normal heart sounds and intact distal pulses  Exam reveals no gallop and no friction rub  No murmur heard  Pulmonary/Chest: Effort normal and breath sounds normal  No stridor  No respiratory distress  He has no wheezes  He has no rales  Abdominal: Soft  Bowel sounds are normal  He exhibits no distension  There is no tenderness  There is no rebound and no guarding  Musculoskeletal: Normal range of motion  He exhibits edema (Trace pitting edema pretibially left lower extremity)  He exhibits no tenderness or deformity  Neurological: He is alert and oriented to person, place, and time  He exhibits normal muscle tone  Coordination normal    Skin: Skin is warm and dry  No rash noted  He is not diaphoretic  No erythema  No pallor  Psychiatric: He has a normal mood and affect  His behavior is normal  Judgment and thought content normal    Nursing note and vitals reviewed          Additional Data:     Labs:      Results from last 7 days  Lab Units 01/30/19  0459   WBC Thousand/uL 8 42   HEMOGLOBIN g/dL 8 7*   HEMATOCRIT % 26 6*   PLATELETS Thousands/uL 194       Results from last 7 days  Lab Units 01/30/19  0459   SODIUM mmol/L 139   POTASSIUM mmol/L 3 9   CHLORIDE mmol/L 103   CO2 mmol/L 28   BUN mg/dL 19   CREATININE mg/dL 1 31*   ANION GAP mmol/L 8   CALCIUM mg/dL 9 0   GLUCOSE RANDOM mg/dL 119           Results from last 7 days  Lab Units 01/30/19  0740 01/29/19  2216 01/29/19  1603 01/29/19  1114 01/29/19  0621 01/28/19  2110 01/28/19  1701 01/28/19  1110 01/28/19  0720   POC GLUCOSE mg/dl 110 146* 110 179* 112 178* 157* 112 129                   * I Have Reviewed All Lab Data Listed Above  * Additional Pertinent Lab Tests Reviewed:  [place] [facility] Admission Reviewed    Imaging:    Imaging Reports Reviewed Today Include:    Imaging Personally Reviewed by Myself Includes:       Recent Cultures (last 7 days):           Last 24 Hours Medication List:     Current Facility-Administered Medications:  acetaminophen 650 mg Oral Q6H PRN Jono Berkowitz PA-C   aluminum-magnesium hydroxide-simethicone 15 mL Oral Q6H PRN Jono Berkowitz PA-C   ascorbic acid 500 mg Oral BID Jono Berkowitz PA-C   aspirin 325 mg Oral Daily Jono Berkowitz PA-C   bisacodyl 10 mg Rectal Daily PRN Jono Berkowitz PA-C   calcium carbonate 1,000 mg Oral Daily PRN Jono Berkowitz PA-C   docusate sodium 100 mg Oral BID Jono Berkowitz PA-C   enoxaparin 30 mg Subcutaneous Q12H [facility] Jono Berkowitz PA-C   ferrous sulfate 325 mg Oral BID With Meals Jono Berkowitz PA-C   folic acid 1 mg Oral Daily Jono Berkowitz PA-C   gabapentin 100 mg Oral [facility] Filemon Yoon   insulin lispro 1-5 Units Subcutaneous TID [facility] April DUNG Esteves   insulin lispro 1-5 Units Subcutaneous HS April DUNG Esteves   losartan 100 mg Oral Daily Jono Berkowitz PA-C   metoprolol succinate 100 mg Oral Daily April D DUNG Russell   morphine injection 2 mg Intravenous Q4H PRN Jono Berkowitz PA-C   multivitamin-minerals 1 tablet Oral Daily Jono Berkowitz, PA-C   ondansetron 4 mg Intravenous Q4H PRN Jessenia Prom, PA-C   oxyCODONE-acetaminophen 1 tablet Oral Q4H PRN Jessenia Prom, PA-C   oxyCODONE-acetaminophen 2 tablet Oral Q4H PRN Jessenia Prom, PA-C   pantoprazole 40 mg Oral Early Morning Jessenia Prom, PA-C   pravastatin 40 mg Oral HS Jessenia Prom, PA-C   senna 1 tablet Oral Daily Jessenia Prom, PA-C   simethicone 80 mg Oral 4x Daily PRN Jessenia Prom, PA-C        Today, Patient Was Seen By: Onel Almonte MD    ** Please Note: Dictation voice to text software may have been used in the creation of this document   **

## 2019-01-30 NOTE — OCCUPATIONAL THERAPY NOTE
[address] Evaluation     Patient Name: Zulema Carreon  Today's Date: 1/30/2019  Problem List  Patient Active Problem List   Diagnosis    Basal cell carcinoma of skin    Coronary disease    Type 2 diabetes mellitus without complication, without long-term current use of insulin ([facility] [address] )    Dyslipidemia    Elevation of level of transaminase or lactic acid dehydrogenase (LDH)    Erectile dysfunction    Esophageal reflux    Fatty liver    Malignant lymphoma (HCC)    Malignant tumor of cecum ([address]    Multiple myeloma not having achieved remission ([facility] [address] )    Primary localized osteoarthritis of right knee    Primary localized osteoarthritis of left knee    Atrial fibrillation ([address] )    Hx of CABG    Colitis    Essential hypertension    S/P total knee arthroplasty, left    Postoperative anemia due to acute blood loss     Past Medical History  Past Medical History:   Diagnosis Date    Atrial fibrillation ([facility] [address] [facility] )     Colitis     Diabetes mellitus ([facility] [address] [facility] )     Fatty liver     History of chemotherapy     History of radiation therapy     History of shingles 2018    Hypertension     Pneumonia      Past Surgical History  Past Surgical History:   Procedure Laterality Date    AORTIC VALVE REPLACEMENT      COLECTOMY      COLONOSCOPY      DENTAL SURGERY      KNEE SURGERY      LYMPHADENECTOMY      OTHER SURGICAL HISTORY      MAZE PROCEDURE    OH TOTAL KNEE ARTHROPLASTY Left 1/28/2019    Procedure: ARTHROPLASTY LEFT KNEE TOTAL;  Surgeon: Deborah Calderón MD;  Location: [facility];  Service: Orthopedics         01/30/19 1050   Note Type   Note type Eval only   Restrictions/Precautions   Weight Bearing Precautions Per Order Yes   LLE Weight Bearing Per Order WBAT   Pain Assessment   Pain Assessment No/denies pain   Home Living   Type of [address] [facility] One level; Able to live on main level with bedroom/bathroom; Performs ADLs on one level   Bathroom Shower/Tub Walk-in shower   Bathroom Toilet Raised   Bathroom Equipment Built-in shower seat;Grab bars in shower; Toilet raiser;Grab bars around toilet   Bathroom Accessibility Accessible via walker   [address] [facility],[address]   Additional Comments Pt lives in [address] 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   Psychosocial   Psychosocial (WDL) WDL   Patient Behaviors/Mood Cooperative   Subjective   Subjective "I'm all set, I can even work from home"    ADL   Where Assessed Standing at sink   [address]  [address]   Additional Comments Pt recieved ambulating in [place] with PT supervision   Transfers   Sit to [address] to Sit 5  Supervision   Stand pivot 5  Supervision   Functional Mobility   Additional items Rolling walker   Balance   Static Sitting Good   Dynamic Sitting Good   Static Standing Fair +   Dynamic Standing [address], [address] 5 +   Activity Tolerance   Activity Tolerance Patient tolerated treatment well   Nurse Made Aware OK to see per RN   RUE Assessment   RUE Assessment WFL   LUE Assessment   LUE Assessment WFL   Hand Function   Gross Motor Coordination Functional   Fine Motor Coordination Functional   Cognition   Overall Cognitive Status WFL   Arousal/Participation Alert; Cooperative   Attention Within functional limits   Orientation Level Oriented X4   Memory Within functional limits   Following Commands Follows all commands and directions without difficulty   Assessment   Limitation Decreased ADL status; Decreased self-care trans;Decreased high-level ADLs   Prognosis Good   Assessment Pt is a 68 y o  male seen for OT evaluation s/p admit to [facility] on 1/28/2019 w/ Primary localized osteoarthritis of left knee  Comorbidities affecting pt's functional performance at time of assessment include: coronary disease, type 2 diabetes mellitus, dyslipedemia, essential hypertension, postoperative anemia  Personal factors affecting pt at time of IE include:difficulty performing ADLS and difficulty performing IADLS   Prior to admission, pt was independent in ADLs, IADLs and functional mobility without the use of an AD, + , works part time in an office setting  Upon evaluation: Pt requires supervision for functional mobility and min A for lowerbody ADLs 2* the following deficits impacting occupational performance: decreased ROM, decreased strength, decreased balance, decreased tolerance and orthopedic restrictions  Pt was able to demonstrate safe transfers and toileting with supervision, and dressed himself this AM with min A for shoes only  Pt reports no concerns from a self care standpoint as his wife will be home 24/7 to assist as needed during recovery  From OT standpoint, recommendation at time of d/c would be home with family support       Goals   Patient Goals Go home   Plan   OT Frequency Eval only   Recommendation   OT Discharge Recommendation Home with family support   Barthel Index   Feeding 10   Bathing 0   Grooming Score 5   Dressing Score 5   Bladder Score 10   Bowels Score 10   Toilet Use Score 10   Transfers (Bed/Chair) Score 10   Mobility (Level Surface) Score 10   Stairs Score 5   Barthel Index Score 75     Gabby Never, MOT, OTR/L

## 2019-01-30 NOTE — PLAN OF CARE
Problem: PHYSICAL THERAPY ADULT  Goal: Performs mobility at highest level of function for planned discharge setting  See evaluation for individualized goals  Treatment/Interventions: LE strengthening/ROM, Elevations, Therapeutic exercise, Patient/family training, Equipment eval/education, Gait training, Spoke to nursing, Spoke to case management  Equipment Recommended: Jenna Villafana       See flowsheet documentation for full assessment, interventions and recommendations  Outcome: Progressing  Prognosis: Good  Problem List: Decreased strength, Decreased range of motion, Decreased endurance, Impaired balance, Decreased mobility, Decreased coordination, Decreased safety awareness  Assessment: Pt Doing well with mobility adn ther ex  Will see in pm and hope to d/c in AM 1/31  Barriers to Discharge:  (medical status)     Recommendation: Home with family support, Home PT     PT - OK to Discharge: Yes    See flowsheet documentation for full assessment

## 2019-01-30 NOTE — PLAN OF CARE
Problem: Potential for Falls  Goal: Patient will remain free of falls  INTERVENTIONS:  - Assess patient frequently for physical needs  -  Identify cognitive and physical deficits and behaviors that affect risk of falls    -  [place] fall precautions as indicated by assessment   - Educate patient/family on patient safety including physical limitations  - Instruct patient to call for assistance with activity based on assessment  - Modify environment to reduce risk of injury  - Consider OT/PT consult to assist with strengthening/mobility    Outcome: Progressing      Problem: METABOLIC, FLUID AND ELECTROLYTES - ADULT  Goal: Glucose maintained within target range  INTERVENTIONS:  - Monitor Blood Glucose as ordered  - Assess for signs and symptoms of hyperglycemia and hypoglycemia  - Administer ordered medications to maintain glucose within target range  - Assess nutritional intake and initiate nutrition service referral as needed   Outcome: Progressing      Problem: SKIN/TISSUE INTEGRITY - ADULT  Goal: Skin integrity remains intact  INTERVENTIONS  - Identify patients at risk for skin breakdown  - Assess and monitor skin integrity  - Assess and monitor nutrition and hydration status  - Monitor labs (i e  albumin)  - Assess for incontinence   - Turn and reposition patient  - Assist with mobility/ambulation  - Relieve pressure over bony prominences  - Avoid friction and shearing  - Provide appropriate hygiene as needed including keeping skin clean and dry  - Evaluate need for skin moisturizer/barrier cream  - Collaborate with interdisciplinary team (i e  Nutrition, Rehabilitation, etc )   - Patient/family teaching   Outcome: Progressing    Goal: Incision(s), wounds(s) or drain site(s) healing without S/S of infection  INTERVENTIONS  - Assess and document risk factors for skin impairment   - Assess and document dressing, incision, wound bed, drain sites and surrounding tissue  - Initiate Nutrition services consult and/or wound management as needed   Outcome: Progressing      Problem: MUSCULOSKELETAL - ADULT  Goal: Maintain or return mobility to safest level of function  INTERVENTIONS:  - Assess patient's ability to carry out ADLs; assess patient's baseline for ADL function and identify physical deficits which impact ability to perform ADLs (bathing, care of mouth/teeth, toileting, grooming, dressing, etc )  - Assess/evaluate cause of self-care deficits   - Assess range of motion  - Assess patient's mobility; develop plan if impaired  - Assess patient's need for assistive devices and provide as appropriate  - Encourage maximum independence but intervene and supervise when necessary  - Involve family in performance of ADLs  - Assess for home care needs following discharge   - Request OT consult to assist with ADL evaluation and planning for discharge  - Provide patient education as appropriate   Outcome: Progressing    Goal: Maintain proper alignment of affected body part  INTERVENTIONS:  - Support, maintain and protect limb and body alignment  - Provide pt/fam with appropriate education   Outcome: Progressing      Problem: PAIN - ADULT  Goal: Verbalizes/displays adequate comfort level or baseline comfort level  Interventions:  - Encourage patient to monitor pain and request assistance  - Assess pain using appropriate pain scale  - Administer analgesics based on type and severity of pain and evaluate response  - Implement non-pharmacological measures as appropriate and evaluate response  - Consider cultural and social influences on pain and pain management  - Notify physician/advanced practitioner if interventions unsuccessful or patient reports new pain   Outcome: Progressing      Problem: INFECTION - ADULT  Goal: Absence or prevention of progression during hospitalization  INTERVENTIONS:  - Assess and monitor for signs and symptoms of infection  - Monitor lab/diagnostic results  - Monitor all insertion sites, i e  indwelling lines, tubes, and drains  - Monitor endotracheal (as able) and nasal secretions for changes in amount and color  - [place] appropriate cooling/warming therapies per order  - Administer medications as ordered  - Instruct and encourage patient and family to use good hand hygiene technique  - Identify and instruct in appropriate isolation precautions for identified infection/condition   Outcome: Progressing      Problem: SAFETY ADULT  Goal: Patient will remain free of falls  INTERVENTIONS:  - Assess patient frequently for physical needs  -  Identify cognitive and physical deficits and behaviors that affect risk of falls  -  [place] fall precautions as indicated by assessment   - Educate patient/family on patient safety including physical limitations  - Instruct patient to call for assistance with activity based on assessment  - Modify environment to reduce risk of injury  - Consider OT/PT consult to assist with strengthening/mobility    Outcome: Progressing      Problem: DISCHARGE PLANNING  Goal: Discharge to home or other facility with appropriate resources  INTERVENTIONS:  - Identify barriers to discharge w/patient and caregiver  - Arrange for needed discharge resources and transportation as appropriate  - Identify discharge learning needs (meds, wound care, etc )  - Arrange for interpretive services to assist at discharge as needed  - Refer to Case Management Department for coordinating discharge planning if the patient needs post-hospital services based on physician/advanced practitioner order or complex needs related to functional status, cognitive ability, or social support system   Outcome: Progressing      Problem: Knowledge Deficit  Goal: Patient/family/caregiver demonstrates understanding of disease process, treatment plan, medications, and discharge instructions  Complete learning assessment and assess knowledge base    Interventions:  - Provide teaching at level of understanding  - Provide teaching via preferred learning methods   Outcome: Progressing      Problem: Nutrition/Hydration-ADULT  Goal: Nutrient/Hydration intake appropriate for improving, restoring or maintaining nutritional needs  Monitor and assess patient's nutrition/hydration status for malnutrition (ex- brittle hair, bruises, dry skin, pale skin and conjunctiva, muscle wasting, smooth red tongue, and disorientation)  Collaborate with interdisciplinary team and initiate plan and interventions as ordered  Monitor patient's weight and dietary intake as ordered or per policy  Utilize nutrition screening tool and intervene per policy  Determine patient's food preferences and provide high-protein, high-caloric foods as appropriate       INTERVENTIONS:  - Monitor oral intake, urinary output, labs, and treatment plans  - Assess nutrition and hydration status and recommend course of action  - Evaluate amount of meals eaten  - Assist patient with eating if necessary   - Allow adequate time for meals  - Recommend/ encourage appropriate diets, oral nutritional supplements, and vitamin/mineral supplements  - Order, calculate, and assess calorie counts as needed  - Recommend, monitor, and adjust tube feedings and TPN/PPN based on assessed needs  - Assess need for intravenous fluids  - Provide specific nutrition/hydration education as appropriate  - Include patient/family/caregiver in decisions related to nutrition   Outcome: Progressing

## 2019-01-30 NOTE — PLAN OF CARE
Problem: OCCUPATIONAL THERAPY ADULT  Goal: Performs self-care activities at highest level of function for planned discharge setting  See evaluation for individualized goals  See flowsheet documentation for full assessment, interventions and recommendations  Outcome: Progressing  Limitation: Decreased ADL status, Decreased self-care trans, Decreased high-level ADLs  Prognosis: Good  Assessment: Pt is a 68 y o  male seen for OT evaluation s/p admit to [facility] on 1/28/2019 w/ Primary localized osteoarthritis of left knee  Comorbidities affecting pt's functional performance at time of assessment include: coronary disease, type 2 diabetes mellitus, dyslipedemia, essential hypertension, postoperative anemia  Personal factors affecting pt at time of IE include:difficulty performing ADLS and difficulty performing IADLS   Prior to admission, pt was independent in ADLs, IADLs and functional mobility without the use of an AD, + , works part time in an office setting  Upon evaluation: Pt requires supervision for functional mobility and min A for lowerbody ADLs 2* the following deficits impacting occupational performance: decreased ROM, decreased strength, decreased balance, decreased tolerance and orthopedic restrictions  Pt was able to demonstrate safe transfers and toileting with supervision, and dressed himself this AM with min A for shoes only  Pt reports no concerns from a self care standpoint as his wife will be home 24/7 to assist as needed during recovery  From OT standpoint, recommendation at time of d/c would be home with family support         OT Discharge Recommendation: Home with family support

## 2019-01-30 NOTE — PROGRESS NOTES
Spoke with Ortho BYRON MORA, as Pt  Expressed that he wished to not be d/c'd to home today and his wife is in agreement  D/c order to be cancelled

## 2019-01-30 NOTE — ASSESSMENT & PLAN NOTE
Continue Cozaar and Toprol-XL with hold parameters to avoid hypotension  Continue to hold Aldactone secondary to low blood pressures  No longer with symptomatic hypotension  Monitor blood pressure with routine vitals

## 2019-01-31 ENCOUNTER — TRANSITIONAL CARE MANAGEMENT (OUTPATIENT)
Dept: FAMILY MEDICINE CLINIC | Facility: HOSPITAL | Age: 74
End: 2019-01-31

## 2019-01-31 VITALS
WEIGHT: 176 LBS | DIASTOLIC BLOOD PRESSURE: 58 MMHG | RESPIRATION RATE: 16 BRPM | HEIGHT: 71 IN | SYSTOLIC BLOOD PRESSURE: 117 MMHG | TEMPERATURE: 98.3 F | HEART RATE: 91 BPM | BODY MASS INDEX: 24.64 KG/M2 | OXYGEN SATURATION: 96 %

## 2019-01-31 LAB
ANION GAP SERPL CALCULATED.3IONS-SCNC: 10 MMOL/L (ref 4–13)
BUN SERPL-MCNC: 15 MG/DL (ref 5–25)
CALCIUM SERPL-MCNC: 8.8 MG/DL (ref 8.3–10.1)
CHLORIDE SERPL-SCNC: 100 MMOL/L (ref 100–108)
CO2 SERPL-SCNC: 28 MMOL/L (ref 21–32)
CREAT SERPL-MCNC: 1.4 MG/DL (ref 0.6–1.3)
ERYTHROCYTE [DISTWIDTH] IN BLOOD BY AUTOMATED COUNT: 14.9 % (ref 11.6–15.1)
GFR SERPL CREATININE-BSD FRML MDRD: 50 ML/MIN/1.73SQ M
GLUCOSE SERPL-MCNC: 146 MG/DL (ref 65–140)
GLUCOSE SERPL-MCNC: 147 MG/DL (ref 65–140)
HCT VFR BLD AUTO: 29.9 % (ref 36.5–49.3)
HGB BLD-MCNC: 9.5 G/DL (ref 12–17)
MCH RBC QN AUTO: 28.4 PG (ref 26.8–34.3)
MCHC RBC AUTO-ENTMCNC: 31.8 G/DL (ref 31.4–37.4)
MCV RBC AUTO: 89 FL (ref 82–98)
PLATELET # BLD AUTO: 248 THOUSANDS/UL (ref 149–390)
PMV BLD AUTO: 10.7 FL (ref 8.9–12.7)
POTASSIUM SERPL-SCNC: 3.8 MMOL/L (ref 3.5–5.3)
RBC # BLD AUTO: 3.35 MILLION/UL (ref 3.88–5.62)
SODIUM SERPL-SCNC: 138 MMOL/L (ref 136–145)
WBC # BLD AUTO: 13.47 THOUSAND/UL (ref 4.31–10.16)

## 2019-01-31 PROCEDURE — 80048 BASIC METABOLIC PNL TOTAL CA: CPT | Performed by: PHYSICIAN ASSISTANT

## 2019-01-31 PROCEDURE — 85027 COMPLETE CBC AUTOMATED: CPT | Performed by: PHYSICIAN ASSISTANT

## 2019-01-31 PROCEDURE — 99024 POSTOP FOLLOW-UP VISIT: CPT | Performed by: PHYSICIAN ASSISTANT

## 2019-01-31 PROCEDURE — 82948 REAGENT STRIP/BLOOD GLUCOSE: CPT

## 2019-01-31 RX ADMIN — STANDARDIZED SENNA CONCENTRATE 8.6 MG: 8.6 TABLET ORAL at 08:13

## 2019-01-31 RX ADMIN — ENOXAPARIN SODIUM 30 MG: 30 INJECTION SUBCUTANEOUS at 08:13

## 2019-01-31 RX ADMIN — FERROUS SULFATE TAB 325 MG (65 MG ELEMENTAL FE) 325 MG: 325 (65 FE) TAB at 08:14

## 2019-01-31 RX ADMIN — OXYCODONE HYDROCHLORIDE AND ACETAMINOPHEN 2 TABLET: 5; 325 TABLET ORAL at 02:44

## 2019-01-31 RX ADMIN — METOPROLOL SUCCINATE 100 MG: 50 TABLET, EXTENDED RELEASE ORAL at 09:58

## 2019-01-31 RX ADMIN — GABAPENTIN 100 MG: 100 CAPSULE ORAL at 05:32

## 2019-01-31 RX ADMIN — PANTOPRAZOLE SODIUM 40 MG: 40 TABLET, DELAYED RELEASE ORAL at 05:32

## 2019-01-31 RX ADMIN — ASPIRIN 325 MG ORAL TABLET 325 MG: 325 PILL ORAL at 08:13

## 2019-01-31 RX ADMIN — FOLIC ACID 1 MG: 1 TABLET ORAL at 08:13

## 2019-01-31 RX ADMIN — OXYCODONE HYDROCHLORIDE AND ACETAMINOPHEN 1 TABLET: 5; 325 TABLET ORAL at 10:30

## 2019-01-31 RX ADMIN — Medication 1 TABLET: at 08:13

## 2019-01-31 RX ADMIN — OXYCODONE HYDROCHLORIDE AND ACETAMINOPHEN 500 MG: 500 TABLET ORAL at 08:13

## 2019-01-31 NOTE — DISCHARGE SUMMARY
ORTHOPEDICS DISCHARGE SUMMARY  Hetal Hastings 68 y o  male MRN: [id number]  Unit/Bed#:     Attending Physician: Dr Dk Jhaveri    Admitting diagnosis: Primary localized osteoarthritis of left knee [M17 12]    Discharge diagnosis: Primary localized osteoarthritis of left knee [M17 12]    Date of admission: 1/28/2019    Date of discharge: 01/31/19         Procedure: left total knee arthroplasty    HPI:  This is a 68y o  year old male that presented to the office with signs and symptoms of left knee osteoarthritis  They tried and failed conservative treatment measures and wished to proceed with surgical intervention  The risks, benefits, and complications of the procedure were discussed with the patient and informed consent was obtained  Hospital Course: The patient was admitted to the hospital on 1/28/2019 and underwent an uncomplicated left total knee arthroplasty  They were transferred to the floor after a brief stay in the post-anesthesia care unit  Their pain was well managed with IV and oral pain medications  They began therapy on post operative day #1  Lovenox was also started for DVT prophylaxis post operative day #1  On discharge date pt was cleared by PT and the medicine team and determined to be safe for discharge  Daily discussion was had with the patient, nursing staff, orthopaedic team, and family members if present  All questions were answered to the patients satisifaction  0  Lab Value Date/Time   HGB 9 5 (L) 01/31/2019 0528   HGB 8 7 (L) 01/30/2019 0459   HGB 10 0 (L) 01/29/2019 0458   HGB 13 1 01/08/2019 0854   HGB 10 6 (L) 11/28/2018 0454   HGB 11 3 (L) 11/27/2018 0437   HGB 12 4 11/26/2018 0449   HGB 13 9 11/25/2018 1744   HGB 12 5 (L) 05/18/2018 0839   HGB 14 5 05/19/2017 0720   HGB 13 7 02/05/2016 0000   HGB 13 7 02/22/2014 0845       Greater than 2 gram drop which qualifies for diagnosis of acute blood loss anemia    Vital signs remained stable and pt was resuscitated with IVF as needed     Discharge Instructions: The patient was discharged weight bearing as tolerated to the left lower extremity  He will take  mg BID x 30 days for DVT prophylaxis  Continue PT/OT  Take pain medications as instructed  Discharge Medications: For the complete list of discharge medications, please refer to the patient's medication reconciliation

## 2019-01-31 NOTE — UTILIZATION REVIEW
Auth:   [id number]    Notification of Discharge  This is a Notification of Discharge from our facility [address]  Please be advised that this patient has been discharge from our facility  Below you will find the admission and discharge date and time including the patients disposition  PRESENTATION DATE: 1/28/2019  6:57 AM  IP ADMISSION DATE: 1/28/19 1113  DISCHARGE DATE: 1/31/2019 11:22 AM  DISPOSITION: Home with [address] Utilization Review Department  Phone: [phone]; Fax [phone]  [email or web address]  ATTENTION: Please call with any questions or concerns to [phone]  and carefully listen to the prompts so that you are directed to the right person  Send all requests for admission clinical reviews, approved or denied determinations and any other requests to fax [phone]   All voicemails are confidential

## 2019-01-31 NOTE — PROGRESS NOTES
Jaelyn Herring  68 y o   male  MR#: [id number]  1/31/2019    Post-op days: 3  Extremity: left knee    Subjective: Patient seen and examined  Lying comfortably in bed  Pain is controlled well with Percocet  Denies chest pain, SOB, fever, chills, dysuria or frequency  Nicholette Motts to be discharged today  Vitals:   Vitals:    01/31/19 0734   BP: 109/59   Pulse: 87   Resp: 16   Temp: (!) 95 °F (35 °C)   SpO2: 96%       Exam:   A&O x 3 NAD  Left knee:  Dressing clean/dry/intact  Calf and thigh soft, compressible  NV intact      Labs:   WBC   Recent Labs      01/29/19   0458  01/30/19   0459  01/31/19   0528   WBC  9 15  8 42  13 47*     H/H   Recent Labs      01/29/19   0458  01/30/19   0459  01/31/19   0528   HGB  10 0*  8 7*  9 5*   /  Recent Labs      01/29/19 0458 01/30/19 0459 01/31/19   0528   HCT  31 2*  26 6*  29 9*     Sed Rate No results for input(s): SEDRATE in the last 72 hours  CRP No results for input(s): CRP in the last 72 hours  Assessment:   S/p left TKA    Plan:   WBAT to LLE with assistance  PT/OT  DVT prophylaxis: Lovenox and foot pumps  Will discharge home on  mg BID x 30 days  Pain control  Encourage incentive spirometry  ABLA- stable  Will monitor vitals and H/H  Discharge home today  Follow up in office with Dr Richa Kearney in 10-14 days  Follow up with PCP in one week to discuss restarting diuretic

## 2019-01-31 NOTE — PLAN OF CARE
Problem: Potential for Falls  Goal: Patient will remain free of falls  INTERVENTIONS:  - Assess patient frequently for physical needs  -  Identify cognitive and physical deficits and behaviors that affect risk of falls    -  [place] fall precautions as indicated by assessment   - Educate patient/family on patient safety including physical limitations  - Instruct patient to call for assistance with activity based on assessment  - Modify environment to reduce risk of injury  - Consider OT/PT consult to assist with strengthening/mobility    Outcome: Adequate for Discharge      Problem: METABOLIC, FLUID AND ELECTROLYTES - ADULT  Goal: Glucose maintained within target range  INTERVENTIONS:  - Monitor Blood Glucose as ordered  - Assess for signs and symptoms of hyperglycemia and hypoglycemia  - Administer ordered medications to maintain glucose within target range  - Assess nutritional intake and initiate nutrition service referral as needed   Outcome: Adequate for Discharge      Problem: SKIN/TISSUE INTEGRITY - ADULT  Goal: Skin integrity remains intact  INTERVENTIONS  - Identify patients at risk for skin breakdown  - Assess and monitor skin integrity  - Assess and monitor nutrition and hydration status  - Monitor labs (i e  albumin)  - Assess for incontinence   - Turn and reposition patient  - Assist with mobility/ambulation  - Relieve pressure over bony prominences  - Avoid friction and shearing  - Provide appropriate hygiene as needed including keeping skin clean and dry  - Evaluate need for skin moisturizer/barrier cream  - Collaborate with interdisciplinary team (i e  Nutrition, Rehabilitation, etc )   - Patient/family teaching   Outcome: Adequate for Discharge    Goal: Incision(s), wounds(s) or drain site(s) healing without S/S of infection  INTERVENTIONS  - Assess and document risk factors for skin impairment   - Assess and document dressing, incision, wound bed, drain sites and surrounding tissue  - Initiate Nutrition services consult and/or wound management as needed   Outcome: Adequate for Discharge      Problem: MUSCULOSKELETAL - ADULT  Goal: Maintain or return mobility to safest level of function  INTERVENTIONS:  - Assess patient's ability to carry out ADLs; assess patient's baseline for ADL function and identify physical deficits which impact ability to perform ADLs (bathing, care of mouth/teeth, toileting, grooming, dressing, etc )  - Assess/evaluate cause of self-care deficits   - Assess range of motion  - Assess patient's mobility; develop plan if impaired  - Assess patient's need for assistive devices and provide as appropriate  - Encourage maximum independence but intervene and supervise when necessary  - Involve family in performance of ADLs  - Assess for home care needs following discharge   - Request OT consult to assist with ADL evaluation and planning for discharge  - Provide patient education as appropriate   Outcome: Adequate for Discharge    Goal: Maintain proper alignment of affected body part  INTERVENTIONS:  - Support, maintain and protect limb and body alignment  - Provide pt/fam with appropriate education   Outcome: Adequate for Discharge      Problem: PAIN - ADULT  Goal: Verbalizes/displays adequate comfort level or baseline comfort level  Interventions:  - Encourage patient to monitor pain and request assistance  - Assess pain using appropriate pain scale  - Administer analgesics based on type and severity of pain and evaluate response  - Implement non-pharmacological measures as appropriate and evaluate response  - Consider cultural and social influences on pain and pain management  - Notify physician/advanced practitioner if interventions unsuccessful or patient reports new pain   Outcome: Adequate for Discharge      Problem: INFECTION - ADULT  Goal: Absence or prevention of progression during hospitalization  INTERVENTIONS:  - Assess and monitor for signs and symptoms of infection  - Monitor lab/diagnostic results  - Monitor all insertion sites, i e  indwelling lines, tubes, and drains  - Monitor endotracheal (as able) and nasal secretions for changes in amount and color  - [place] appropriate cooling/warming therapies per order  - Administer medications as ordered  - Instruct and encourage patient and family to use good hand hygiene technique  - Identify and instruct in appropriate isolation precautions for identified infection/condition   Outcome: Adequate for Discharge      Problem: SAFETY ADULT  Goal: Patient will remain free of falls  INTERVENTIONS:  - Assess patient frequently for physical needs  -  Identify cognitive and physical deficits and behaviors that affect risk of falls    -  [place] fall precautions as indicated by assessment   - Educate patient/family on patient safety including physical limitations  - Instruct patient to call for assistance with activity based on assessment  - Modify environment to reduce risk of injury  - Consider OT/PT consult to assist with strengthening/mobility    Outcome: Adequate for Discharge      Problem: DISCHARGE PLANNING  Goal: Discharge to home or other facility with appropriate resources  INTERVENTIONS:  - Identify barriers to discharge w/patient and caregiver  - Arrange for needed discharge resources and transportation as appropriate  - Identify discharge learning needs (meds, wound care, etc )  - Arrange for interpretive services to assist at discharge as needed  - Refer to Case Management Department for coordinating discharge planning if the patient needs post-hospital services based on physician/advanced practitioner order or complex needs related to functional status, cognitive ability, or social support system   Outcome: Adequate for Discharge      Problem: Knowledge Deficit  Goal: Patient/family/caregiver demonstrates understanding of disease process, treatment plan, medications, and discharge instructions  Complete learning assessment and assess knowledge base  Interventions:  - Provide teaching at level of understanding  - Provide teaching via preferred learning methods   Outcome: Adequate for Discharge      Problem: Nutrition/Hydration-ADULT  Goal: Nutrient/Hydration intake appropriate for improving, restoring or maintaining nutritional needs  Monitor and assess patient's nutrition/hydration status for malnutrition (ex- brittle hair, bruises, dry skin, pale skin and conjunctiva, muscle wasting, smooth red tongue, and disorientation)  Collaborate with interdisciplinary team and initiate plan and interventions as ordered  Monitor patient's weight and dietary intake as ordered or per policy  Utilize nutrition screening tool and intervene per policy  Determine patient's food preferences and provide high-protein, high-caloric foods as appropriate       INTERVENTIONS:  - Monitor oral intake, urinary output, labs, and treatment plans  - Assess nutrition and hydration status and recommend course of action  - Evaluate amount of meals eaten  - Assist patient with eating if necessary   - Allow adequate time for meals  - Recommend/ encourage appropriate diets, oral nutritional supplements, and vitamin/mineral supplements  - Order, calculate, and assess calorie counts as needed  - Recommend, monitor, and adjust tube feedings and TPN/PPN based on assessed needs  - Assess need for intravenous fluids  - Provide specific nutrition/hydration education as appropriate  - Include patient/family/caregiver in decisions related to nutrition   Outcome: Adequate for Discharge

## 2019-02-04 ENCOUNTER — OFFICE VISIT (OUTPATIENT)
Dept: FAMILY MEDICINE CLINIC | Facility: HOSPITAL | Age: 74
End: 2019-02-04
Payer: COMMERCIAL

## 2019-02-04 ENCOUNTER — HOSPITAL ENCOUNTER (OUTPATIENT)
Dept: NON INVASIVE DIAGNOSTICS | Facility: HOSPITAL | Age: 74
Discharge: HOME | End: 2019-02-04
Payer: COMMERCIAL

## 2019-02-04 VITALS
WEIGHT: 187 LBS | HEIGHT: 71 IN | SYSTOLIC BLOOD PRESSURE: 118 MMHG | DIASTOLIC BLOOD PRESSURE: 58 MMHG | HEART RATE: 99 BPM | BODY MASS INDEX: 26.18 KG/M2 | TEMPERATURE: 98.3 F

## 2019-02-04 DIAGNOSIS — E11.9 TYPE 2 DIABETES MELLITUS WITHOUT COMPLICATION, WITHOUT LONG-TERM CURRENT USE OF INSULIN (HCC): ICD-10-CM

## 2019-02-04 DIAGNOSIS — I10 ESSENTIAL HYPERTENSION: ICD-10-CM

## 2019-02-04 DIAGNOSIS — Z96.652 S/P TOTAL KNEE ARTHROPLASTY, LEFT: ICD-10-CM

## 2019-02-04 DIAGNOSIS — D62 POSTOPERATIVE ANEMIA DUE TO ACUTE BLOOD LOSS: ICD-10-CM

## 2019-02-04 DIAGNOSIS — Z09 HOSPITAL DISCHARGE FOLLOW-UP: Primary | ICD-10-CM

## 2019-02-04 DIAGNOSIS — M17.12 PRIMARY LOCALIZED OSTEOARTHRITIS OF LEFT KNEE: ICD-10-CM

## 2019-02-04 DIAGNOSIS — R50.9 ELEVATED TEMPERATURE: ICD-10-CM

## 2019-02-04 PROCEDURE — 1111F DSCHRG MED/CURRENT MED MERGE: CPT | Performed by: INTERNAL MEDICINE

## 2019-02-04 PROCEDURE — 93970 EXTREMITY STUDY: CPT

## 2019-02-04 PROCEDURE — 99496 TRANSJ CARE MGMT HIGH F2F 7D: CPT | Performed by: INTERNAL MEDICINE

## 2019-02-04 PROCEDURE — 93970 EXTREMITY STUDY: CPT | Performed by: SURGERY

## 2019-02-04 RX ORDER — IRBESARTAN 75 MG/1
75 TABLET ORAL DAILY
Qty: 30 TABLET | Refills: 1 | Status: SHIPPED | OUTPATIENT
Start: 2019-02-04 | End: 2019-02-19 | Stop reason: SDUPTHER

## 2019-02-04 NOTE — PROGRESS NOTES
Assessment/Plan:    Primary localized osteoarthritis of left knee  S/p L TKR, doing PT and using pain meds     S/P total knee arthroplasty, left  Doing well, doing PT, has pain meds to use prn, on ASA for DVT prophylaxis, has some redness/warmth/swelling of LLE which is not unexpected after TKR but is having low grade temps which are concerning - will start with US to r/o DVT and if neg will need UA/CBC - call with new/worse symptoms    Postoperative anemia due to acute blood loss  Stable, on iron/Vit C    Essential hypertension  Bp low while in the hospital so Irbesartan and Aldactone were held - BP up a bit at home so will re-start Irbesartan but at 75 mg once daily - con't checking BP at home and call if < 110/70 or > 145/90 or if dizziness/lightheadedness occurs    Type 2 diabetes mellitus without complication, without long-term current use of insulin (HCC)  Lab Results   Component Value Date    HGBA1C 7 0 (H) 01/08/2019       No results for input(s): POCGLU in the last 72 hours  Blood Sugar Average: Last 72 hrs:  BS stable at home with average 140-150, con't current meds, keep f/u appt for March Diagnoses and all orders for this visit:    Hospital discharge follow-up    Primary localized osteoarthritis of left knee    S/P total knee arthroplasty, left  -     VAS lower limb venous duplex study, complete bilateral; Future    Postoperative anemia due to acute blood loss    Essential hypertension  -     irbesartan (AVAPRO) 75 mg tablet;  Take 1 tablet (75 mg total) by mouth daily    Type 2 diabetes mellitus without complication, without long-term current use of insulin (HCC)    Elevated temperature  Comments:  D/w pt that can occur with inflammation after the surgery but also increased risk for DVT/PE, UTI, pneumonia - will start with LE US if neg will do UA/CBC if still with elevated temp will need CXR and further eval by Ortho, incision examined and no obvious sign of infection/abscess present  Orders:  - VAS lower limb venous duplex study, complete bilateral; Future  -     UA/M w/rflx Culture, Routine  -     CBC and differential; Future  -     CBC and differential          Subjective:      Patient ID: Leeroy Best is a 68 y o  male  HPI Pt here for ALBER/hospital follow up  PT was admitted to [facility] from 1/28/19 to 1/31/19  Records were reviewed by myself in detail and events are summarized below  TCM Call (since 1/4/2019)     Date and time call was made  1/31/2019  1:40 PM    Hospital care reviewed  Records reviewed    Patient was hospitialized at  [facility]    Date of Admission  01/28/19    Date of discharge  01/31/19    Diagnosis  Left knee replacement    Disposition  Home    Were the patients medications reviewed and updated  No    Current Symptoms  None      TCM Call (since 1/4/2019)     Post hospital issues  None    Should patient be enrolled in anticoag monitoring? No    Scheduled for follow up? Yes    Did you obtain your prescribed medications  Yes    Do you need help managing your prescriptions or medications  No    Is transportation to your appointment needed  No    I have advised the patient to call PCP with any new or worsening symptoms  Carolin Lares MA        Pt presented to [facility] on 1/28/19 for scheduled L TKR  He had the surgery w/o complications  Post op course did note low BP and his Aldactone and Irbesartan were held  He was given DVT prophylaxis with Lovenox  H/H did drop from 10 0/31 2 to 8 7/26 6 and then back to 9 5/29 9  His BS were averaging 140-150's while IP  Pt was discharged home 1/31/19 and told to take  mg bid x 30 days for DVT prevention  He was told to f/u with PCP in 1 wk for discussion of BP meds and f/u with Ortho as directed  Pt has been doing well since discharge  He has been checking his BP at home and his numbers - readings were 113/64, 127/64, 114/59, 117/64 and the past 2 days it has jumped to 143/72 and 142/83    He notes no dizziness/double vision/blurry vision/CP/HA's  He is currently only taking Metoprolol  His sugars have been good since discharge and he is averaging 140-150  He has been more active the past 2 days  His pain today is currently a 5-6/10  He does take the oxycodone after PT  He has noted temp of 100 in the evening since surgery  He has had a mild runny nose which is not new - since radiation in the summer  He notes no other rest symptoms  He notes no urinary frequency/burning/blood in the urine/incontinence  He notes no SOB/hemoptysis  He has had some LLE swelling and some redness but incision is clean and dry w/o drainage  Review of Systems   Constitutional: Positive for chills and fever  Negative for fatigue  HENT: Negative for congestion and sinus pain  Eyes: Negative for pain and visual disturbance  Respiratory: Negative for cough, chest tightness and shortness of breath  Cardiovascular: Positive for leg swelling  Negative for chest pain and palpitations  Gastrointestinal: Negative for abdominal pain, constipation, diarrhea, nausea and vomiting  Endocrine: Negative for polydipsia and polyuria  Genitourinary: Negative for difficulty urinating, dysuria, frequency and hematuria  Musculoskeletal: Positive for arthralgias and joint swelling  Negative for myalgias  Skin: Negative for rash and wound  Neurological: Negative for dizziness and headaches  Hematological: Does not bruise/bleed easily  Psychiatric/Behavioral: Negative for behavioral problems and confusion  Objective:    /58   Pulse 99   Temp 98 3 °F (36 8 °C)   Ht 5' 11" (1 803 m)   Wt 84 8 kg (187 lb)   BMI 26 08 kg/m²      Physical Exam   Constitutional: He appears well-developed and well-nourished  No distress  HENT:   Head: Normocephalic and atraumatic  Right Ear: External ear normal    Left Ear: External ear normal    Mouth/Throat: Oropharynx is clear and moist  No oropharyngeal exudate     Eyes: Conjunctivae are normal  Right eye exhibits no discharge  Left eye exhibits no discharge  Neck: Neck supple  No tracheal deviation present  Cardiovascular: Normal rate, regular rhythm and normal heart sounds  No murmur heard   +2 LLE edema   Pulmonary/Chest: Effort normal and breath sounds normal  No respiratory distress  He has no wheezes  He has no rales  Abdominal: Soft  He exhibits no distension  There is no tenderness  Musculoskeletal: He exhibits edema  He exhibits no tenderness  Ambulates with cane and a limp   Lymphadenopathy:     He has no cervical adenopathy  Neurological: He is alert  He exhibits normal muscle tone  Skin: Skin is warm and dry  No rash noted  L knee incision CDI - mild surrounding erythema and warmth but nontender   Psychiatric: He has a normal mood and affect  His behavior is normal    Nursing note and vitals reviewed

## 2019-02-04 NOTE — ASSESSMENT & PLAN NOTE
Doing well, doing PT, has pain meds to use prn, on ASA for DVT prophylaxis, has some redness/warmth/swelling of LLE which is not unexpected after TKR but is having low grade temps which are concerning - will start with US to r/o DVT and if neg will need UA/CBC - call with new/worse symptoms

## 2019-02-04 NOTE — ASSESSMENT & PLAN NOTE
Lab Results   Component Value Date    HGBA1C 7 0 (H) 01/08/2019       No results for input(s): POCGLU in the last 72 hours      Blood Sugar Average: Last 72 hrs:  BS stable at home with average 140-150, con't current meds, keep f/u appt for March

## 2019-02-04 NOTE — ASSESSMENT & PLAN NOTE
Bp low while in the hospital so Irbesartan and Aldactone were held - BP up a bit at home so will re-start Irbesartan but at 75 mg once daily - con't checking BP at home and call if < 110/70 or > 145/90 or if dizziness/lightheadedness occurs

## 2019-02-07 LAB
APPEARANCE UR: CLEAR
BACTERIA URNS QL MICRO: ABNORMAL
BASOPHILS # BLD AUTO: 0.1 X10E3/UL (ref 0–0.2)
BASOPHILS NFR BLD AUTO: 1 %
BILIRUB UR QL STRIP: NEGATIVE
CASTS URNS MICRO: ABNORMAL
CASTS URNS QL MICRO: PRESENT /LPF
COLOR UR: YELLOW
EOSINOPHIL # BLD AUTO: 0.3 X10E3/UL (ref 0–0.4)
EOSINOPHIL NFR BLD AUTO: 3 %
EPI CELLS #/AREA URNS HPF: ABNORMAL /HPF
ERYTHROCYTE [DISTWIDTH] IN BLOOD BY AUTOMATED COUNT: 15.5 % (ref 12.3–15.4)
GLUCOSE UR QL: NEGATIVE
HCT VFR BLD AUTO: 27.4 % (ref 37.5–51)
HGB BLD-MCNC: 8.7 G/DL (ref 13–17.7)
HGB UR QL STRIP: NEGATIVE
IMM GRANULOCYTES # BLD: 0.1 X10E3/UL (ref 0–0.1)
IMM GRANULOCYTES NFR BLD: 1 %
KETONES UR QL STRIP: ABNORMAL
LEUKOCYTE ESTERASE UR QL STRIP: NEGATIVE
LYMPHOCYTES # BLD AUTO: 1.5 X10E3/UL (ref 0.7–3.1)
LYMPHOCYTES NFR BLD AUTO: 14 %
MCH RBC QN AUTO: 27.7 PG (ref 26.6–33)
MCHC RBC AUTO-ENTMCNC: 31.8 G/DL (ref 31.5–35.7)
MCV RBC AUTO: 87 FL (ref 79–97)
MICRO URNS: ABNORMAL
MICRO URNS: ABNORMAL
MONOCYTES # BLD AUTO: 1.2 X10E3/UL (ref 0.1–0.9)
MONOCYTES NFR BLD AUTO: 11 %
MUCOUS THREADS URNS QL MICRO: PRESENT
NEUTROPHILS # BLD AUTO: 7.7 X10E3/UL (ref 1.4–7)
NEUTROPHILS NFR BLD AUTO: 70 %
NITRITE UR QL STRIP: NEGATIVE
PH UR STRIP: 5.5 [PH] (ref 5–7.5)
PLATELET # BLD AUTO: 461 X10E3/UL (ref 150–379)
PROT UR QL STRIP: NEGATIVE
RBC # BLD AUTO: 3.14 X10E6/UL (ref 4.14–5.8)
RBC #/AREA URNS HPF: ABNORMAL /HPF
SL AMB URINALYSIS REFLEX: ABNORMAL
SP GR UR: 1.02 (ref 1–1.03)
UROBILINOGEN UR STRIP-ACNC: 2 EU/DL (ref 0.2–1)
WBC # BLD AUTO: 10.8 X10E3/UL (ref 3.4–10.8)
WBC #/AREA URNS HPF: ABNORMAL /HPF

## 2019-02-11 ENCOUNTER — TELEPHONE (OUTPATIENT)
Dept: OBGYN CLINIC | Facility: HOSPITAL | Age: 74
End: 2019-02-11

## 2019-02-11 DIAGNOSIS — Z47.1 AFTERCARE FOLLOWING LEFT KNEE JOINT REPLACEMENT SURGERY: Primary | ICD-10-CM

## 2019-02-11 DIAGNOSIS — Z96.652 AFTERCARE FOLLOWING LEFT KNEE JOINT REPLACEMENT SURGERY: Primary | ICD-10-CM

## 2019-02-11 PROBLEM — M17.12 PRIMARY LOCALIZED OSTEOARTHRITIS OF LEFT KNEE: Status: RESOLVED | Noted: 2018-08-17 | Resolved: 2019-02-11

## 2019-02-11 RX ORDER — OXYCODONE HYDROCHLORIDE AND ACETAMINOPHEN 5; 325 MG/1; MG/1
1 TABLET ORAL EVERY 6 HOURS PRN
Qty: 45 TABLET | Refills: 0 | Status: SHIPPED | OUTPATIENT
Start: 2019-02-11 | End: 2019-02-15 | Stop reason: HOSPADM

## 2019-02-11 NOTE — TELEPHONE ENCOUNTER
He should not be taking the medication every 4 hours  It should be taken only as needed  I will prescribe a refill but he should not take it around the clock  He he needs to continue keep the leg elevated and iced  The throbbing pain is usually due to swelling and the narcotic medication usually do not work well

## 2019-02-11 NOTE — TELEPHONE ENCOUNTER
Patient called call center requesting med refill:     Medication Name: oxycodone       Dosage of Med: 5-325 mg        Frequency of Med:  Every 4hrs        Remaining Medication: 4 left        Pharmacy and Location: [facility]

## 2019-02-13 ENCOUNTER — APPOINTMENT (OUTPATIENT)
Dept: RADIOLOGY | Facility: CLINIC | Age: 74
DRG: 291 | End: 2019-02-13
Payer: COMMERCIAL

## 2019-02-13 ENCOUNTER — OFFICE VISIT (OUTPATIENT)
Dept: OBGYN CLINIC | Facility: CLINIC | Age: 74
End: 2019-02-13

## 2019-02-13 VITALS
HEIGHT: 71 IN | DIASTOLIC BLOOD PRESSURE: 70 MMHG | SYSTOLIC BLOOD PRESSURE: 130 MMHG | HEART RATE: 80 BPM | WEIGHT: 183 LBS | BODY MASS INDEX: 25.62 KG/M2

## 2019-02-13 DIAGNOSIS — Z96.652 AFTERCARE FOLLOWING LEFT KNEE JOINT REPLACEMENT SURGERY: ICD-10-CM

## 2019-02-13 DIAGNOSIS — Z47.1 AFTERCARE FOLLOWING LEFT KNEE JOINT REPLACEMENT SURGERY: Primary | ICD-10-CM

## 2019-02-13 DIAGNOSIS — Z47.1 AFTERCARE FOLLOWING LEFT KNEE JOINT REPLACEMENT SURGERY: ICD-10-CM

## 2019-02-13 DIAGNOSIS — Z96.652 AFTERCARE FOLLOWING LEFT KNEE JOINT REPLACEMENT SURGERY: Primary | ICD-10-CM

## 2019-02-13 PROCEDURE — 73562 X-RAY EXAM OF KNEE 3: CPT

## 2019-02-13 PROCEDURE — 99024 POSTOP FOLLOW-UP VISIT: CPT | Performed by: ORTHOPAEDIC SURGERY

## 2019-02-13 RX ORDER — NITROGLYCERIN 0.3 MG/1
TABLET SUBLINGUAL
COMMUNITY

## 2019-02-13 NOTE — ASSESSMENT & PLAN NOTE
Patient is doing well status post left total knee arthroplasty on January 28, 2019  X-rays were reviewed with the patient  He was given a prescription to start outpatient therapy  He may shower and get incision wet at this time  Advised patient to only take pain medication as needed  Follow up in 8 weeks with repeat x-rays  All questions were answered to patient's satisfaction

## 2019-02-13 NOTE — PROGRESS NOTES
Assessment:     1  Aftercare following left knee joint replacement surgery        Plan:  The patient was seen and examined by Dr Braden Spain and myself  Problem List Items Addressed This Visit        Other    Aftercare following left knee joint replacement surgery - Primary     Patient is doing well status post left total knee arthroplasty on January 28, 2019  X-rays were reviewed with the patient  He was given a prescription to start outpatient therapy  He may shower and get incision wet at this time  Advised patient to only take pain medication as needed  Follow up in 8 weeks with repeat x-rays  All questions were answered to patient's satisfaction  Relevant Orders    XR knee 3 vw left non injury    Ambulatory referral to Physical Therapy         Subjective:     Patient ID: Lorrie Myles is a 68 y o  male  Chief Complaint: This is a 71-year-old white male who is status post left total knee arthroplasty on January 28, 2019  He is currently residing at home getting home physical therapy  His pain is improving  He states he takes about 2-3 Percocet a day  He has been icing and elevating his left knee, but swelling has improved  He does state that he had a low-grade fever measured at about 100 over the past few nights, but resolved last night  He denies any drainage from the incision        Allergy:  Allergies   Allergen Reactions    Ceftin [Cefuroxime] Itching     Medications:  all current active meds have been reviewed  Past Medical History:  Past Medical History:   Diagnosis Date    Atrial fibrillation ([address] )     Colitis     Diabetes mellitus [facility] [address] [facility]     Fatty liver     History of chemotherapy     History of radiation therapy     History of shingles 2018    Hypertension     Pneumonia      Past Surgical History:  Past Surgical History:   Procedure Laterality Date    AORTIC VALVE REPLACEMENT      COLECTOMY      COLONOSCOPY      DENTAL SURGERY      KNEE SURGERY      LYMPHADENECTOMY  OTHER SURGICAL HISTORY      MAZE PROCEDURE    MT TOTAL KNEE ARTHROPLASTY Left 1/28/2019    Procedure: ARTHROPLASTY LEFT KNEE TOTAL;  Surgeon: Willian Ryan MD;  Location:  MAIN OR;  Service: Orthopedics     Family History:  Family History   Problem Relation Age of Onset    Heart block Family     Coronary artery disease Mother     Thrombosis Mother     Diabetes Father     Hypertension Father     Colon cancer Paternal Grandfather      Social History:  Social History     Substance and Sexual Activity   Alcohol Use Yes    Comment: 5-6 beers a week     Social History     Substance and Sexual Activity   Drug Use No     Social History     Tobacco Use   Smoking Status Former Smoker    Packs/day: 1 00    Years: 40 00    Pack years: 40 00    Types: Cigarettes    Last attempt to quit: 2009    Years since quitting: 10 1   Smokeless Tobacco Never Used     Review of Systems   Constitutional: Negative  HENT: Negative  Eyes: Negative  Respiratory: Negative  Cardiovascular: Negative  Gastrointestinal: Negative  Endocrine: Negative  Genitourinary: Negative  Musculoskeletal: Positive for arthralgias, gait problem and joint swelling  Skin: Negative  Allergic/Immunologic: Negative  Hematological: Negative  Psychiatric/Behavioral: Negative  Objective:  BP Readings from Last 1 Encounters:   02/13/19 130/70      Wt Readings from Last 1 Encounters:   02/13/19 83 kg (183 lb)      BMI:   Estimated body mass index is 25 52 kg/m² as calculated from the following:    Height as of this encounter: 5' 11" (1 803 m)  Weight as of this encounter: 83 kg (183 lb)  BSA:   Estimated body surface area is 2 03 meters squared as calculated from the following:    Height as of this encounter: 5' 11" (1 803 m)  Weight as of this encounter: 83 kg (183 lb)  Physical Exam   Constitutional: He is oriented to person, place, and time  He appears well-developed     HENT:   Head: Normocephalic and atraumatic  Eyes: Conjunctivae and EOM are normal    Neck: Neck supple  Neurological: He is alert and oriented to person, place, and time  Skin: Skin is warm  Psychiatric: He has a normal mood and affect  Nursing note and vitals reviewed  Left Knee Exam     Range of Motion   Extension: -15   Flexion: 100     Tests   Varus: negative Valgus: negative    Other   Erythema: absent  Scars: present (Healing midline incision with no evidence of infection)  Sensation: normal  Pulse: present  Swelling: moderate    Comments:  Thigh and calf soft, nontender  Negative Homans            I have personally reviewed pertinent films in PACS  X-rays left knee reveal a well-aligned prosthesis with no evidence of loosening

## 2019-02-14 ENCOUNTER — APPOINTMENT (INPATIENT)
Dept: NON INVASIVE DIAGNOSTICS | Facility: HOSPITAL | Age: 74
DRG: 291 | End: 2019-02-14
Payer: COMMERCIAL

## 2019-02-14 ENCOUNTER — HOSPITAL ENCOUNTER (INPATIENT)
Facility: HOSPITAL | Age: 74
LOS: 1 days | Discharge: HOME HEALTH | DRG: 291 | End: 2019-02-15
Attending: EMERGENCY MEDICINE | Admitting: INTERNAL MEDICINE
Payer: COMMERCIAL

## 2019-02-14 ENCOUNTER — APPOINTMENT (EMERGENCY)
Dept: CT IMAGING | Facility: HOSPITAL | Age: 74
DRG: 291 | End: 2019-02-14
Attending: EMERGENCY MEDICINE
Payer: COMMERCIAL

## 2019-02-14 DIAGNOSIS — R94.31 PROLONGED Q-T INTERVAL ON ECG: ICD-10-CM

## 2019-02-14 DIAGNOSIS — R07.9 CHEST PAIN: ICD-10-CM

## 2019-02-14 DIAGNOSIS — J90 BILATERAL PLEURAL EFFUSION: ICD-10-CM

## 2019-02-14 DIAGNOSIS — Z96.652 AFTERCARE FOLLOWING LEFT KNEE JOINT REPLACEMENT SURGERY: ICD-10-CM

## 2019-02-14 DIAGNOSIS — K52.9 COLITIS: ICD-10-CM

## 2019-02-14 DIAGNOSIS — I50.33 ACUTE ON CHRONIC DIASTOLIC CONGESTIVE HEART FAILURE (HCC): ICD-10-CM

## 2019-02-14 DIAGNOSIS — I50.9 CHF (CONGESTIVE HEART FAILURE) (HCC): ICD-10-CM

## 2019-02-14 DIAGNOSIS — R06.89 ACUTE RESPIRATORY INSUFFICIENCY: Primary | ICD-10-CM

## 2019-02-14 DIAGNOSIS — Z47.1 AFTERCARE FOLLOWING LEFT KNEE JOINT REPLACEMENT SURGERY: ICD-10-CM

## 2019-02-14 DIAGNOSIS — J81.1 PULMONARY EDEMA: ICD-10-CM

## 2019-02-14 DIAGNOSIS — R09.02 HYPOXIA: ICD-10-CM

## 2019-02-14 PROBLEM — J96.01 ACUTE RESPIRATORY FAILURE WITH HYPOXIA (HCC): Status: ACTIVE | Noted: 2019-02-14

## 2019-02-14 PROBLEM — Z95.3 HISTORY OF AORTIC VALVE REPLACEMENT WITH BIOPROSTHETIC VALVE: Chronic | Status: ACTIVE | Noted: 2019-02-14

## 2019-02-14 LAB
ALBUMIN SERPL BCP-MCNC: 3.1 G/DL (ref 3.5–5)
ALP SERPL-CCNC: 76 U/L (ref 46–116)
ALT SERPL W P-5'-P-CCNC: 19 U/L (ref 12–78)
ANION GAP SERPL CALCULATED.3IONS-SCNC: 12 MMOL/L (ref 4–13)
APTT PPP: 40 SECONDS (ref 26–38)
AST SERPL W P-5'-P-CCNC: 14 U/L (ref 5–45)
ATRIAL RATE: 97 BPM
BASOPHILS # BLD AUTO: 0.09 THOUSANDS/ΜL (ref 0–0.1)
BASOPHILS NFR BLD AUTO: 1 % (ref 0–1)
BILIRUB SERPL-MCNC: 0.8 MG/DL (ref 0.2–1)
BUN SERPL-MCNC: 11 MG/DL (ref 5–25)
CALCIUM SERPL-MCNC: 8.9 MG/DL (ref 8.3–10.1)
CHLORIDE SERPL-SCNC: 101 MMOL/L (ref 100–108)
CO2 SERPL-SCNC: 28 MMOL/L (ref 21–32)
CREAT SERPL-MCNC: 1.01 MG/DL (ref 0.6–1.3)
DEPRECATED D DIMER PPP: 3643 NG/ML (FEU)
EOSINOPHIL # BLD AUTO: 0.49 THOUSAND/ΜL (ref 0–0.61)
EOSINOPHIL NFR BLD AUTO: 5 % (ref 0–6)
ERYTHROCYTE [DISTWIDTH] IN BLOOD BY AUTOMATED COUNT: 15.9 % (ref 11.6–15.1)
GFR SERPL CREATININE-BSD FRML MDRD: 74 ML/MIN/1.73SQ M
GLUCOSE SERPL-MCNC: 130 MG/DL (ref 65–140)
GLUCOSE SERPL-MCNC: 137 MG/DL (ref 65–140)
GLUCOSE SERPL-MCNC: 147 MG/DL (ref 65–140)
GLUCOSE SERPL-MCNC: 167 MG/DL (ref 65–140)
HCT VFR BLD AUTO: 30.3 % (ref 36.5–49.3)
HGB BLD-MCNC: 9.4 G/DL (ref 12–17)
IMM GRANULOCYTES # BLD AUTO: 0.08 THOUSAND/UL (ref 0–0.2)
IMM GRANULOCYTES NFR BLD AUTO: 1 % (ref 0–2)
INR PPP: 1.26 (ref 0.86–1.17)
LYMPHOCYTES # BLD AUTO: 1.86 THOUSANDS/ΜL (ref 0.6–4.47)
LYMPHOCYTES NFR BLD AUTO: 18 % (ref 14–44)
MCH RBC QN AUTO: 27.6 PG (ref 26.8–34.3)
MCHC RBC AUTO-ENTMCNC: 31 G/DL (ref 31.4–37.4)
MCV RBC AUTO: 89 FL (ref 82–98)
MONOCYTES # BLD AUTO: 1.07 THOUSAND/ΜL (ref 0.17–1.22)
MONOCYTES NFR BLD AUTO: 10 % (ref 4–12)
NEUTROPHILS # BLD AUTO: 6.7 THOUSANDS/ΜL (ref 1.85–7.62)
NEUTS SEG NFR BLD AUTO: 65 % (ref 43–75)
NRBC BLD AUTO-RTO: 0 /100 WBCS
NT-PROBNP SERPL-MCNC: 3494 PG/ML
P AXIS: 57 DEGREES
PLATELET # BLD AUTO: 510 THOUSANDS/UL (ref 149–390)
PLATELET # BLD AUTO: 564 THOUSANDS/UL (ref 149–390)
PMV BLD AUTO: 8.5 FL (ref 8.9–12.7)
PMV BLD AUTO: 8.8 FL (ref 8.9–12.7)
POTASSIUM SERPL-SCNC: 3.9 MMOL/L (ref 3.5–5.3)
PR INTERVAL: 200 MS
PROT SERPL-MCNC: 6.6 G/DL (ref 6.4–8.2)
PROTHROMBIN TIME: 15.1 SECONDS (ref 11.8–14.2)
QRS AXIS: -75 DEGREES
QRSD INTERVAL: 116 MS
QT INTERVAL: 422 MS
QTC INTERVAL: 535 MS
RBC # BLD AUTO: 3.4 MILLION/UL (ref 3.88–5.62)
SODIUM SERPL-SCNC: 141 MMOL/L (ref 136–145)
T WAVE AXIS: 76 DEGREES
TROPONIN I SERPL-MCNC: <0.02 NG/ML
VENTRICULAR RATE: 97 BPM
WBC # BLD AUTO: 10.29 THOUSAND/UL (ref 4.31–10.16)

## 2019-02-14 PROCEDURE — 85049 AUTOMATED PLATELET COUNT: CPT | Performed by: INTERNAL MEDICINE

## 2019-02-14 PROCEDURE — 82948 REAGENT STRIP/BLOOD GLUCOSE: CPT

## 2019-02-14 PROCEDURE — 71275 CT ANGIOGRAPHY CHEST: CPT

## 2019-02-14 PROCEDURE — 85730 THROMBOPLASTIN TIME PARTIAL: CPT | Performed by: EMERGENCY MEDICINE

## 2019-02-14 PROCEDURE — 36415 COLL VENOUS BLD VENIPUNCTURE: CPT | Performed by: EMERGENCY MEDICINE

## 2019-02-14 PROCEDURE — 80053 COMPREHEN METABOLIC PANEL: CPT | Performed by: EMERGENCY MEDICINE

## 2019-02-14 PROCEDURE — 94664 DEMO&/EVAL PT USE INHALER: CPT

## 2019-02-14 PROCEDURE — 84484 ASSAY OF TROPONIN QUANT: CPT | Performed by: EMERGENCY MEDICINE

## 2019-02-14 PROCEDURE — 85610 PROTHROMBIN TIME: CPT | Performed by: EMERGENCY MEDICINE

## 2019-02-14 PROCEDURE — 93005 ELECTROCARDIOGRAM TRACING: CPT

## 2019-02-14 PROCEDURE — 93010 ELECTROCARDIOGRAM REPORT: CPT | Performed by: INTERNAL MEDICINE

## 2019-02-14 PROCEDURE — 99223 1ST HOSP IP/OBS HIGH 75: CPT | Performed by: INTERNAL MEDICINE

## 2019-02-14 PROCEDURE — 83880 ASSAY OF NATRIURETIC PEPTIDE: CPT | Performed by: EMERGENCY MEDICINE

## 2019-02-14 PROCEDURE — 85379 FIBRIN DEGRADATION QUANT: CPT | Performed by: EMERGENCY MEDICINE

## 2019-02-14 PROCEDURE — 99285 EMERGENCY DEPT VISIT HI MDM: CPT

## 2019-02-14 PROCEDURE — 84484 ASSAY OF TROPONIN QUANT: CPT | Performed by: INTERNAL MEDICINE

## 2019-02-14 PROCEDURE — 85025 COMPLETE CBC W/AUTO DIFF WBC: CPT | Performed by: EMERGENCY MEDICINE

## 2019-02-14 RX ORDER — SPIRONOLACTONE 25 MG/1
50 TABLET ORAL 2 TIMES DAILY
Status: DISCONTINUED | OUTPATIENT
Start: 2019-02-14 | End: 2019-02-15 | Stop reason: HOSPADM

## 2019-02-14 RX ORDER — ACETAMINOPHEN 325 MG/1
650 TABLET ORAL EVERY 4 HOURS PRN
Status: DISCONTINUED | OUTPATIENT
Start: 2019-02-14 | End: 2019-02-15 | Stop reason: HOSPADM

## 2019-02-14 RX ORDER — DOCUSATE SODIUM 100 MG/1
100 CAPSULE, LIQUID FILLED ORAL 2 TIMES DAILY
Status: DISCONTINUED | OUTPATIENT
Start: 2019-02-14 | End: 2019-02-15 | Stop reason: HOSPADM

## 2019-02-14 RX ORDER — LOSARTAN POTASSIUM 50 MG/1
100 TABLET ORAL DAILY
Status: DISCONTINUED | OUTPATIENT
Start: 2019-02-14 | End: 2019-02-15 | Stop reason: HOSPADM

## 2019-02-14 RX ORDER — OXYCODONE HYDROCHLORIDE AND ACETAMINOPHEN 5; 325 MG/1; MG/1
1 TABLET ORAL EVERY 6 HOURS PRN
Status: DISCONTINUED | OUTPATIENT
Start: 2019-02-14 | End: 2019-02-15 | Stop reason: HOSPADM

## 2019-02-14 RX ORDER — NITROGLYCERIN 0.4 MG/1
0.4 TABLET SUBLINGUAL
Status: DISCONTINUED | OUTPATIENT
Start: 2019-02-14 | End: 2019-02-15 | Stop reason: HOSPADM

## 2019-02-14 RX ORDER — PRAVASTATIN SODIUM 40 MG
40 TABLET ORAL
Status: DISCONTINUED | OUTPATIENT
Start: 2019-02-14 | End: 2019-02-15 | Stop reason: HOSPADM

## 2019-02-14 RX ORDER — FUROSEMIDE 10 MG/ML
40 INJECTION INTRAMUSCULAR; INTRAVENOUS ONCE
Status: COMPLETED | OUTPATIENT
Start: 2019-02-14 | End: 2019-02-14

## 2019-02-14 RX ORDER — FERROUS SULFATE 325(65) MG
325 TABLET ORAL 2 TIMES DAILY WITH MEALS
Status: DISCONTINUED | OUTPATIENT
Start: 2019-02-14 | End: 2019-02-15 | Stop reason: HOSPADM

## 2019-02-14 RX ORDER — FUROSEMIDE 10 MG/ML
40 INJECTION INTRAMUSCULAR; INTRAVENOUS 2 TIMES DAILY
Status: DISCONTINUED | OUTPATIENT
Start: 2019-02-14 | End: 2019-02-15 | Stop reason: HOSPADM

## 2019-02-14 RX ORDER — ASCORBIC ACID 500 MG
500 TABLET ORAL 2 TIMES DAILY
Status: DISCONTINUED | OUTPATIENT
Start: 2019-02-14 | End: 2019-02-15 | Stop reason: HOSPADM

## 2019-02-14 RX ORDER — FOLIC ACID 1 MG/1
1 TABLET ORAL DAILY
Status: DISCONTINUED | OUTPATIENT
Start: 2019-02-14 | End: 2019-02-15 | Stop reason: HOSPADM

## 2019-02-14 RX ORDER — ONDANSETRON 2 MG/ML
4 INJECTION INTRAMUSCULAR; INTRAVENOUS EVERY 8 HOURS PRN
Status: DISCONTINUED | OUTPATIENT
Start: 2019-02-14 | End: 2019-02-15 | Stop reason: HOSPADM

## 2019-02-14 RX ORDER — PANTOPRAZOLE SODIUM 20 MG/1
20 TABLET, DELAYED RELEASE ORAL
Status: DISCONTINUED | OUTPATIENT
Start: 2019-02-15 | End: 2019-02-15 | Stop reason: HOSPADM

## 2019-02-14 RX ORDER — ASPIRIN 325 MG
325 TABLET ORAL 2 TIMES DAILY
Status: DISCONTINUED | OUTPATIENT
Start: 2019-02-14 | End: 2019-02-15 | Stop reason: HOSPADM

## 2019-02-14 RX ORDER — METOPROLOL SUCCINATE 50 MG/1
100 TABLET, EXTENDED RELEASE ORAL DAILY
Status: DISCONTINUED | OUTPATIENT
Start: 2019-02-14 | End: 2019-02-15 | Stop reason: HOSPADM

## 2019-02-14 RX ORDER — POTASSIUM CHLORIDE 20 MEQ/1
20 TABLET, EXTENDED RELEASE ORAL DAILY
Status: DISCONTINUED | OUTPATIENT
Start: 2019-02-14 | End: 2019-02-15 | Stop reason: HOSPADM

## 2019-02-14 RX ADMIN — FOLIC ACID 1 MG: 1 TABLET ORAL at 17:34

## 2019-02-14 RX ADMIN — LOSARTAN POTASSIUM 100 MG: 50 TABLET, FILM COATED ORAL at 17:33

## 2019-02-14 RX ADMIN — FUROSEMIDE 40 MG: 10 INJECTION, SOLUTION INTRAVENOUS at 08:27

## 2019-02-14 RX ADMIN — POTASSIUM CHLORIDE 20 MEQ: 1500 TABLET, EXTENDED RELEASE ORAL at 11:29

## 2019-02-14 RX ADMIN — SPIRONOLACTONE 50 MG: 25 TABLET, FILM COATED ORAL at 17:33

## 2019-02-14 RX ADMIN — ENOXAPARIN SODIUM 30 MG: 30 INJECTION SUBCUTANEOUS at 11:29

## 2019-02-14 RX ADMIN — ASPIRIN 325 MG ORAL TABLET 325 MG: 325 PILL ORAL at 17:32

## 2019-02-14 RX ADMIN — Medication 1 TABLET: at 17:33

## 2019-02-14 RX ADMIN — PRAVASTATIN SODIUM 40 MG: 40 TABLET ORAL at 22:29

## 2019-02-14 RX ADMIN — DOCUSATE SODIUM 100 MG: 100 CAPSULE, LIQUID FILLED ORAL at 11:29

## 2019-02-14 RX ADMIN — FUROSEMIDE 40 MG: 10 INJECTION, SOLUTION INTRAMUSCULAR; INTRAVENOUS at 17:32

## 2019-02-14 RX ADMIN — DOCUSATE SODIUM 100 MG: 100 CAPSULE, LIQUID FILLED ORAL at 17:32

## 2019-02-14 RX ADMIN — FERROUS SULFATE TAB 325 MG (65 MG ELEMENTAL FE) 325 MG: 325 (65 FE) TAB at 17:32

## 2019-02-14 RX ADMIN — OXYCODONE HYDROCHLORIDE AND ACETAMINOPHEN 500 MG: 500 TABLET ORAL at 17:33

## 2019-02-14 RX ADMIN — NITROGLYCERIN 0.5 INCH: 20 OINTMENT TOPICAL at 06:28

## 2019-02-14 RX ADMIN — METOPROLOL SUCCINATE 100 MG: 50 TABLET, EXTENDED RELEASE ORAL at 17:33

## 2019-02-14 RX ADMIN — IOHEXOL 85 ML: 350 INJECTION, SOLUTION INTRAVENOUS at 07:39

## 2019-02-14 NOTE — ED NOTES
Pt has left knee replaced two weeks ago  Wound is intact, no sings on infection no drainage  Wound is has steri strips   Pt ahs full range of motion in knee     Gabby Sullivan RN  02/14/19 1710

## 2019-02-14 NOTE — ASSESSMENT & PLAN NOTE
Patient's O2 sat was 86% on room air on arrival currently on 2 L with improvement in sats greater than 92%  No sputum production noted    Had prior admission for pneumonia and pulmonary consult at that time

## 2019-02-14 NOTE — ASSESSMENT & PLAN NOTE
Suspect this is more due to CHF issues-will diurese with IV Lasix and restart patient's spironolactone doses

## 2019-02-14 NOTE — ASSESSMENT & PLAN NOTE
Lab Results   Component Value Date    HGBA1C 7 0 (H) 01/08/2019       No results for input(s): POCGLU in the last 72 hours  Blood Sugar Average: Last 72 hrs:   will place on prudent diet and check q i d   Blood sugars

## 2019-02-14 NOTE — PROGRESS NOTES
Progress Note - Sarahy Villela 1945, 68 y o  male MRN: [id number]    Unit/Bed#: ED 05 Encounter: [id number]    Primary Care Provider: Tony Andrew DO   Date and time admitted to hospital: 2/14/2019  5:40 AM        Bilateral pleural effusion  Assessment & Plan  Suspect this is more due to CHF issues-will diurese with IV Lasix and restart patient's spironolactone doses    Acute respiratory failure with hypoxia ([facility] [address] )  Assessment & Plan  Patient's O2 sat was 86% on room air on arrival currently on 2 L with improvement in sats greater than 92%  No sputum production noted    Had prior admission for pneumonia and pulmonary consult at that time    Coronary disease  Assessment & Plan  With chest pain present prior to admission will check serial troponins and consult Cardiology    * CHF (congestive heart failure) ([facility] [address] )  Assessment & Plan  Will check echo to assess systolic and diastolic function as well as valvular function to determine more clearly a cause  The patient is irrbesartan and spironolactone   had been decreased during hospital stay for knee replacement because of relative hypotension-blood pressure is now elevated and will resume at prior doses    History of aortic valve replacement with bioprosthetic valve  Assessment & Plan  Follows with Cardiology at [facility] post prior bioprosthetic aortic valve and single-vessel CABG  Will check echo to assess valve functionality    Essential hypertension  Assessment & Plan  Elevated readings here in the  to 992F systolic-will increase usual med doses    Hx of CABG  Assessment & Plan  Patient unsure which vessel had been bypassed-will attempt to obtain records from prior Cardiology team    Paroxysmal atrial fibrillation [facility])  Assessment & Plan  Current EKG shows sinus rhythm with normal rates    Dyslipidemia  Assessment & Plan  Continue on Pravachol    Type 2 diabetes mellitus without complication, without long-term current use of insulin [facility])  Assessment & Plan  Lab Results   Component Value Date    HGBA1C 7 0 (H) 01/08/2019       No results for input(s): POCGLU in the last 72 hours  Blood Sugar Average: Last 72 hrs:   will place on prudent diet and check q i d  Blood sugars        VTE Prophylaxis: ordered    Code Status: as above    Anticipated Length of Stay:  Greater than 2 midnight stay  Justification for Hospital Stay: see assessment and plan        Chief Complaint:  Shortness of breath and chest pain  History of Present Illness:    Pablo Ramirez is a 68 y o  male who presents with onset in the middle the night with showed breathing difficulty and chest pressure  Patient does have a history of prior aortic valve replacement as well as a single bypass but he is uncertain which vessel was involved  He follows with Cardiology team at [facility]  He had a recent total knee replacement on the left done here with Dr Peri Malone several weeks ago and has been progressing well with that  On arrival to the ER CT scan was perform CTA did not show evidence of PE but did show bilateral pleural effusions and changes consistent with CHF  D-dimer was elevated  Patient's O2 sat was 86% on room room air on arrival and he was placed on O2 supplementation  During his recent hospital stay his spironolactone and irbesartan were decreased because of relative hypotension at that time  Review of Systems:    Review of Systems   Constitutional: Negative for fever  HENT: Negative for congestion  Respiratory:        Wife reports that he had pneumonia in November and some ongoing cough which is now improved and is not having any discolored sputum production   Gastrointestinal: Negative for abdominal distention and abdominal pain  Genitourinary: Positive for frequency  Musculoskeletal: Positive for joint swelling          Doing well with cane since recent left knee replacement   All other systems reviewed and are negative  Past Medical and Surgical History:     Past Medical History:   Diagnosis Date    Atrial fibrillation [facility] [address] [facility]     Colitis     Diabetes mellitus ([facility] [address] )     Fatty liver     History of chemotherapy     History of radiation therapy     History of shingles 2018    Hypertension     Pneumonia        Past Surgical History:   Procedure Laterality Date    AORTIC VALVE REPLACEMENT      COLECTOMY      COLONOSCOPY      DENTAL SURGERY      KNEE SURGERY      LYMPHADENECTOMY      OTHER SURGICAL HISTORY      MAZE PROCEDURE    FL TOTAL KNEE ARTHROPLASTY Left 1/28/2019    Procedure: ARTHROPLASTY LEFT KNEE TOTAL;  Surgeon: Fuad Nolen MD;  Location: [facility] OR;  Service: Orthopedics       Meds/Allergies:    Prior to Admission Medications   Prescriptions Last Dose Informant Patient Reported? Taking? Multiple Vitamin (MULTI-VITAMIN) tablet 2/13/2019 at Unknown time Self No Yes   Sig: Take 1 tablet by mouth daily   ascorbic acid (VITAMIN C) 500 mg tablet 2/13/2019 at Unknown time Self No Yes   Sig: Take 1 tablet (500 mg total) by mouth 2 (two) times a day   aspirin (HM ASPIRIN) 325 mg tablet 2/14/2019 at Unknown time Self No Yes   Sig: Take 1 tablet (325 mg total) by mouth 2 (two) times a day   ferrous sulfate 324 (65 Fe) mg 2/13/2019 at Unknown time Self No Yes   Sig: Take 1 tablet (324 mg total) by mouth 2 (two) times a day before meals   folic acid (FOLVITE) 1 mg tablet 2/13/2019 at Unknown time Self No Yes   Sig: Take 1 tablet (1 mg total) by mouth daily   irbesartan (AVAPRO) 75 mg tablet 2/13/2019 at Unknown time Self No Yes   Sig: Take 1 tablet (75 mg total) by mouth daily   metFORMIN (GLUCOPHAGE-XR) 750 mg 24 hr tablet   No No   Sig: Take 1 tablet (750 mg total) by mouth daily with dinner for 30 days   metoprolol succinate (TOPROL-XL) 100 mg 24 hr tablet 2/13/2019 at Unknown time Self Yes Yes   Sig: Take 1 tablet(s) every day by oral route for 90 days     nitroglycerin (NITROSTAT) 0 3 mg SL tablet 2/13/2019 at Unknown time Self Yes Yes   Sig: Nitrostat 0 3 mg sublingual tablet   Place 1 tablet as needed by sublingual route as needed for 90 days  omeprazole (PRILOSEC) 20 mg delayed release capsule 2/13/2019 at Unknown time Self Yes Yes   Sig: Take 1 capsule by mouth Daily   oxyCODONE-acetaminophen (PERCOCET) 5-325 mg per tablet Past Week at Unknown time Self No Yes   Sig: Take 1 tablet by mouth every 6 (six) hours as needed for moderate pain or severe painMax Daily Amount: 4 tablets   pravastatin (PRAVACHOL) 40 mg tablet 2/13/2019 at Unknown time Self No Yes   Sig: take 1 tablet by mouth once daily at bedtime      Facility-Administered Medications: None       Allergies: Allergies   Allergen Reactions    Ceftin [Cefuroxime] Itching       Social History:     Social History     Substance and Sexual Activity   Alcohol Use Yes    Comment: 5-6 beers a week     Social History     Tobacco Use   Smoking Status Former Smoker    Packs/day: 1 00    Years: 40 00    Pack years: 40 00    Types: Cigarettes    Last attempt to quit: 2009    Years since quitting: 10 1   Smokeless Tobacco Never Used     Social History     Substance and Sexual Activity   Drug Use No       Family History:    Family History   Problem Relation Age of Onset    Heart block Family     Coronary artery disease Mother     Thrombosis Mother     Diabetes Father     Hypertension Father     Colon cancer Paternal Grandfather        Physical Exam:     Vitals:   Blood Pressure: 167/82 (02/14/19 0845)  Pulse: 99 (02/14/19 0845)  Temperature: 98 9 °F (37 2 °C) (02/14/19 0545)  Temp Source: Temporal (02/14/19 0545)  Respirations: 20 (02/14/19 0845)  SpO2: 96 % (02/14/19 0845)    Physical Exam   Constitutional: He appears well-developed and well-nourished  No distress  HENT:   Head: Normocephalic  Right Ear: External ear normal    Left Ear: External ear normal    Eyes: Right eye exhibits no discharge  Left eye exhibits no discharge     Neck: No tracheal deviation present  No thyromegaly present  Cardiovascular: Normal rate and regular rhythm  Murmur heard  Grade 2/6 systolic murmur in the aortic region and grade 1/6 diastolic murmur in the aortic region   Pulmonary/Chest: He is in respiratory distress  He has no wheezes  He has rales  Abdominal: Soft  Bowel sounds are normal  He exhibits no distension  There is no tenderness  Musculoskeletal: He exhibits no tenderness  Residual swelling and staples in place on the left knee total knee replacement incision  Minimal calf tenderness noted   Skin: No rash noted  No erythema  No pallor  Nursing note and vitals reviewed  Additional Data:     Lab Results: I personally reviewed them    Results from last 7 days   Lab Units 02/14/19  0552   WBC Thousand/uL 10 29*   HEMOGLOBIN g/dL 9 4*   HEMATOCRIT % 30 3*   PLATELETS Thousands/uL 564*   NEUTROS PCT % 65   LYMPHS PCT % 18   MONOS PCT % 10   EOS PCT % 5     Results from last 7 days   Lab Units 02/14/19  0552   POTASSIUM mmol/L 3 9   CHLORIDE mmol/L 101   CO2 mmol/L 28   BUN mg/dL 11   CREATININE mg/dL 1 01   CALCIUM mg/dL 8 9   ALK PHOS U/L 76   ALT U/L 19   AST U/L 14     Results from last 7 days   Lab Units 02/14/19  0552   INR  1 26*       Imaging: I personally reviewed them    CTA ED chest PE study   ED Interpretation by Fabiola Wilkes DO (02/14 5878)   No central PE, bilateral moderate pleural effusion with interstitial edmea      Final Result by Yessy Figueroa MD (02/14 3365)      1  No PE  2   Moderate bibasilar pleural effusions with upper lung zone patchy central opacities concerning for asymmetric pulmonary edema/CHF  Infiltrates are not entirely excluded but considerably less likely  Workstation performed: [id number]             EKG : I personally reviewed      Davey Yoo DO    ** Please Note: This note has been constructed using a voice recognition system   **

## 2019-02-14 NOTE — ED NOTES
pts O2 stats reading high 80's, pt placed on 2LPM NC    Stats improved to low -mid [address], RN  02/14/19 800 [facility] Michael Doshi RN  02/14/19 2506

## 2019-02-14 NOTE — ASSESSMENT & PLAN NOTE
Follows with Cardiology at [facility] post prior bioprosthetic aortic valve and single-vessel CABG  Will check echo to assess valve functionality

## 2019-02-14 NOTE — ASSESSMENT & PLAN NOTE
Will check echo to assess systolic and diastolic function as well as valvular function to determine more clearly a cause  The patient is irrbesartan and spironolactone   had been decreased during hospital stay for knee replacement because of relative hypotension-blood pressure is now elevated and will resume at prior doses

## 2019-02-14 NOTE — RESPIRATORY THERAPY NOTE
RT Protocol Note  [place] 68 y o  male MRN: [id number]  Unit/Bed#: [address] [place] [address] [id number] Encounter: [id number]    Assessment    Principal Problem:    CHF (congestive heart failure) (HCC)  Active Problems:    Coronary disease    Type 2 diabetes mellitus without complication, without long-term current use of insulin (HCC)    Dyslipidemia    Malignant lymphoma (HCC)    Paroxysmal atrial fibrillation (HCC)    Hx of CABG    Essential hypertension    Postoperative anemia due to acute blood loss    Acute respiratory failure with hypoxia (HCC)    Bilateral pleural effusion    History of aortic valve replacement with bioprosthetic valve      Home Pulmonary Medications:  none       Past Medical History:   Diagnosis Date    [facility]     Colitis     Diabetes mellitus ([facility] )     Fatty liver     History of chemotherapy     History of radiation therapy     History of shingles 2018    Hypertension     Pneumonia      Social History     Socioeconomic History    Marital status: /Civil Union     Spouse name: None    Number of children: None    Years of education: None    Highest education level: None   Occupational History    Occupation: WORKING FULLTIME    Social Needs    Financial resource strain: None    Food insecurity:     Worry: None     Inability: None    Transportation needs:     Medical: None     Non-medical: None   Tobacco Use    Smoking status: Former Smoker     Packs/day: 1 00     Years: 40 00     Pack years: 40 00     Types: Cigarettes     Last attempt to quit: 2009     Years since quitting: 10 1    Smokeless tobacco: Never Used   Substance and Sexual Activity    Alcohol use: Yes     Comment: 5-6 beers a week    Drug use: No    Sexual activity: None   Lifestyle    Physical activity:     Days per week: None     Minutes per session: None    Stress: None   Relationships    Social connections:     Talks on phone: None     Gets together: None     Attends [employer] service: None Active member of club or organization: None     Attends meetings of clubs or organizations: None     Relationship status: None    Intimate partner violence:     Fear of current or ex partner: None     Emotionally abused: None     Physically abused: None     Forced sexual activity: None   Other Topics Concern    None   Social History Narrative    None       Subjective         Objective    Physical Exam:   Assessment Type: (P) Assess only  General Appearance: (P) Awake, Alert  Respiratory Pattern: (P) Normal  Chest Assessment: (P) Chest expansion symmetrical  Bilateral Breath Sounds: (P) Clear  Cough: (P) Non-productive    Vitals:  Blood pressure 136/71, pulse 101, temperature 98 4 °F (36 9 °C), temperature source Oral, resp  rate 20, height 5' 11" (1 803 m), weight 81 6 kg (179 lb 14 3 oz), SpO2 95 %  Imaging and other studies: I have personally reviewed pertinent films in PACS          Plan    Respiratory Plan: (P) No distress/Pulmonary history        Resp Comments: (P) pt has no pulm  hx/takes no resp meds at home/BBS clear at this time/no c/o sob at this time/ no resp   tx's required

## 2019-02-14 NOTE — ASSESSMENT & PLAN NOTE
Patient unsure which vessel had been bypassed-will attempt to obtain records from prior Cardiology team

## 2019-02-14 NOTE — ED PROVIDER NOTES
History  Chief Complaint   Patient presents with    Chest Pain     Chest tightness started about 0500  took 1 nitro the pt states he felt better but it still hurt and hes SOB     68year old male presents for evaluation of shortness of breath and chest pain worsening since 5 am this morning  Patient states he has had mild shortness of breath for the past 2-3 days  Chest pain is described as a central pressure sensation which improved after taking nitro at home prior to arrival  Patient took a full dose aspirin at 5:15 this morning  He had undergone left knee replacement 2 weeks ago and has had some mild swelling of the left leg since the surgery  He has had limited mobility since the surgery  No blood thinners  He takes aspirin twice daily  No history of pulmonary disease  Patient states he had duplex ultrasounds of the bilateral lower extremities which were negative for DVT 10 days ago  History of aortic valve replacement and CABG  Patient underwent MAZE procedure for Afib  History provided by:  Patient  Chest Pain   Pain location:  Substernal area  Pain quality: pressure    Pain radiates to:  Does not radiate  Pain severity:  Mild  Onset quality:  Sudden  Duration:  50 minutes  Date/time of last known well:  2/14/2019 5:00 AM  Timing:  Constant  Progression:  Improving  Chronicity:  New  Relieved by:  Nitroglycerin  Worsened by:  Nothing tried  Ineffective treatments:  None tried  Associated symptoms: shortness of breath    Associated symptoms: no abdominal pain, no cough, no diaphoresis, no fatigue, no fever, no headache, no nausea, no palpitations, not vomiting and no weakness    Risk factors: coronary artery disease, diabetes mellitus, hypertension, male sex and surgery    Risk factors: no prior DVT/PE and no smoking        Prior to Admission Medications   Prescriptions Last Dose Informant Patient Reported? Taking?    Multiple Vitamin (MULTI-VITAMIN) tablet 2/13/2019 at Unknown time Self No Yes   Sig: Take 1 tablet by mouth daily   ascorbic acid (VITAMIN C) 500 mg tablet 2/13/2019 at Unknown time Self No Yes   Sig: Take 1 tablet (500 mg total) by mouth 2 (two) times a day   aspirin (HM ASPIRIN) 325 mg tablet 2/14/2019 at Unknown time Self No Yes   Sig: Take 1 tablet (325 mg total) by mouth 2 (two) times a day   ferrous sulfate 324 (65 Fe) mg 2/13/2019 at Unknown time Self No Yes   Sig: Take 1 tablet (324 mg total) by mouth 2 (two) times a day before meals   folic acid (FOLVITE) 1 mg tablet 2/13/2019 at Unknown time Self No Yes   Sig: Take 1 tablet (1 mg total) by mouth daily   irbesartan (AVAPRO) 75 mg tablet 2/13/2019 at Unknown time Self No Yes   Sig: Take 1 tablet (75 mg total) by mouth daily   metFORMIN (GLUCOPHAGE-XR) 750 mg 24 hr tablet   No No   Sig: Take 1 tablet (750 mg total) by mouth daily with dinner for 30 days   metoprolol succinate (TOPROL-XL) 100 mg 24 hr tablet 2/13/2019 at Unknown time Self Yes Yes   Sig: Take 1 tablet(s) every day by oral route for 90 days  nitroglycerin (NITROSTAT) 0 3 mg SL tablet 2/13/2019 at Unknown time Self Yes Yes   Sig: Nitrostat 0 3 mg sublingual tablet   Place 1 tablet as needed by sublingual route as needed for 90 days     omeprazole (PRILOSEC) 20 mg delayed release capsule 2/13/2019 at Unknown time Self Yes Yes   Sig: Take 1 capsule by mouth Daily   oxyCODONE-acetaminophen (PERCOCET) 5-325 mg per tablet Past Week at Unknown time Self No Yes   Sig: Take 1 tablet by mouth every 6 (six) hours as needed for moderate pain or severe painMax Daily Amount: 4 tablets   pravastatin (PRAVACHOL) 40 mg tablet 2/13/2019 at Unknown time Self No Yes   Sig: take 1 tablet by mouth once daily at bedtime      Facility-Administered Medications: None       Past Medical History:   Diagnosis Date    [facility] fibrillation ([facility] )     Colitis     Diabetes mellitus ([facility] )     Fatty liver     History of chemotherapy     History of radiation therapy     History of shingles 2018    Hypertension     Pneumonia        Past Surgical History:   Procedure Laterality Date    AORTIC VALVE REPLACEMENT      COLECTOMY      COLONOSCOPY      DENTAL SURGERY      KNEE SURGERY      LYMPHADENECTOMY      OTHER SURGICAL HISTORY      MAZE PROCEDURE    IL TOTAL KNEE ARTHROPLASTY Left 1/28/2019    Procedure: ARTHROPLASTY LEFT KNEE TOTAL;  Surgeon: Carol Smith MD;  Location:  MAIN OR;  Service: Orthopedics       Family History   Problem Relation Age of Onset    Heart block Family     Coronary artery disease Mother     Thrombosis Mother     Diabetes Father     Hypertension Father     Colon cancer Paternal Grandfather      I have reviewed and agree with the history as documented  Social History     Tobacco Use    Smoking status: Former Smoker     Packs/day: 1 00     Years: 40 00     Pack years: 40 00     Types: Cigarettes     Last attempt to quit: 2009     Years since quitting: 10 1    Smokeless tobacco: Never Used   Substance Use Topics    Alcohol use: Yes     Comment: 5-6 beers a week    Drug use: No        Review of Systems   Constitutional: Negative for appetite change, diaphoresis, fatigue and fever  HENT: Negative for congestion, rhinorrhea and sore throat  Respiratory: Positive for shortness of breath  Negative for cough and chest tightness  Cardiovascular: Positive for chest pain  Negative for palpitations and leg swelling  Gastrointestinal: Negative for abdominal pain, constipation, diarrhea, nausea and vomiting  Genitourinary: Negative for difficulty urinating, dysuria, frequency and hematuria  Musculoskeletal: Negative for myalgias, neck pain and neck stiffness  Skin: Negative for pallor  Neurological: Negative for syncope, weakness and headaches  All other systems reviewed and are negative  Physical Exam  Physical Exam   Constitutional: He appears well-developed and well-nourished  Non-toxic appearance  No distress     HENT:   Head: Normocephalic and atraumatic  Eyes: Pupils are equal, round, and reactive to light  EOM are normal    Neck: Normal range of motion  No tracheal deviation present  No thyromegaly present  Cardiovascular: Normal rate, regular rhythm, normal heart sounds and intact distal pulses  Pulmonary/Chest: Effort normal and breath sounds normal    Abdominal: Soft  Bowel sounds are normal  He exhibits no distension  There is no tenderness  Lymphadenopathy:     He has no cervical adenopathy  Skin: Skin is warm and dry  He is not diaphoretic  Psychiatric: He has a normal mood and affect  His behavior is normal  Judgment and thought content normal    Nursing note and vitals reviewed        Vital Signs  ED Triage Vitals   Temperature Pulse Respirations Blood Pressure SpO2   02/14/19 0545 02/14/19 0542 02/14/19 0542 02/14/19 0542 02/14/19 0542   98 9 °F (37 2 °C) (!) 111 (!) 24 170/81 (!) 86 %      Temp Source Heart Rate Source Patient Position - Orthostatic VS BP Location FiO2 (%)   02/14/19 0545 02/14/19 0745 02/14/19 0745 02/14/19 0745 --   Temporal Monitor Lying Left arm       Pain Score       02/14/19 0542       7           Vitals:    02/14/19 1520 02/14/19 1958 02/15/19 0016 02/15/19 0700   BP: 136/71  134/67 155/74   Pulse: 101 99 80 75   Patient Position - Orthostatic VS: Lying  Lying        Visual Acuity  Visual Acuity      Most Recent Value   L Pupil Size (mm)  3   R Pupil Size (mm)  3          ED Medications  Medications   ascorbic acid (VITAMIN C) tablet 500 mg (500 mg Oral Given 2/15/19 0817)   aspirin tablet 325 mg (325 mg Oral Given 2/15/19 0817)   ferrous sulfate tablet 325 mg (325 mg Oral Given 7/51/58 5442)   folic acid (FOLVITE) tablet 1 mg (1 mg Oral Given 2/15/19 0817)   losartan (COZAAR) tablet 100 mg (100 mg Oral Given 2/15/19 0817)   metoprolol succinate (TOPROL-XL) 24 hr tablet 100 mg (100 mg Oral Given 2/15/19 0817)   multivitamin-minerals (CENTRUM) tablet 1 tablet (1 tablet Oral Given 2/15/19 0820)   nitroglycerin (NITROSTAT) SL tablet 0 4 mg (has no administration in time range)   pantoprazole (PROTONIX) EC tablet 20 mg (20 mg Oral Given 2/15/19 9143)   oxyCODONE-acetaminophen (PERCOCET) 5-325 mg per tablet 1 tablet (has no administration in time range)   pravastatin (PRAVACHOL) tablet 40 mg (40 mg Oral Given 2/14/19 2229)   potassium chloride (K-DUR,KLOR-CON) CR tablet 20 mEq (20 mEq Oral Given 2/15/19 0817)   docusate sodium (COLACE) capsule 100 mg (100 mg Oral Given 2/15/19 0817)   ondansetron (ZOFRAN) injection 4 mg (has no administration in time range)   furosemide (LASIX) injection 40 mg (40 mg Intravenous Given 2/15/19 0817)   spironolactone (ALDACTONE) tablet 50 mg (50 mg Oral Given 2/15/19 0817)   enoxaparin (LOVENOX) subcutaneous injection 30 mg (30 mg Subcutaneous Given 2/15/19 0817)   insulin lispro (HumaLOG) 100 units/mL subcutaneous injection 1-5 Units (1 Units Subcutaneous Not Given 2/14/19 2230)   acetaminophen (TYLENOL) tablet 650 mg (has no administration in time range)   nitroglycerin (NITRO-BID) 2 % TD ointment 0 5 inch (0 5 inches Topical Given 2/14/19 0628)   iohexol (OMNIPAQUE) 350 MG/ML injection (MULTI-DOSE) 85 mL (85 mL Intravenous Given 2/14/19 0739)   furosemide (LASIX) injection 40 mg (40 mg Intravenous Given 2/14/19 0827)       Diagnostic Studies  Results Reviewed     Procedure Component Value Units Date/Time    Lipid Panel with Direct LDL reflex [[id number]]  (Abnormal) Collected:  02/15/19 0550    Lab Status:  Final result Specimen:  Blood from Arm, Left Updated:  02/15/19 4512     Cholesterol 88 mg/dL      Triglycerides 108 mg/dL      HDL, Direct 22 mg/dL      LDL Calculated 44 mg/dL     CBC (With Platelets) [[id number]]  (Abnormal) Collected:  02/15/19 0550    Lab Status:  Final result Specimen:  Blood from Arm, Left Updated:  02/15/19 0640     WBC 8 49 Thousand/uL      RBC 3 45 Million/uL      Hemoglobin 9 3 g/dL      Hematocrit 30 3 %      MCV 88 fL      MCH 27 0 pg      MCHC 30 7 g/dL RDW 16 1 %      Platelets 326 Thousands/uL      MPV 8 8 fL     Troponin I [[id number]]  (Normal) Collected:  02/14/19 1615    Lab Status:  Final result Specimen:  Blood from Arm, Left Updated:  02/14/19 1639     Troponin I <0 02 ng/mL     NT-BNP PRO (BNP for [place], [place], [place], [place], [place], [place], [place], [place] campuses) [[id number]]  (Abnormal) Collected:  02/14/19 0552    Lab Status:  Final result Specimen:  Blood from Arm, Right Updated:  02/14/19 1218     NT-proBNP 3,494 pg/mL     Troponin I [[id number]]  (Normal) Collected:  02/14/19 1123    Lab Status:  Final result Specimen:  Blood from Arm, Right Updated:  02/14/19 1204     Troponin I <0 02 ng/mL     Fingerstick Glucose (POCT) [[id number]]  (Normal) Collected:  02/14/19 1152    Lab Status:  Final result Updated:  02/14/19 1153     POC Glucose 137 mg/dl     Platelet count [[id number]]  (Abnormal) Collected:  02/14/19 1123    Lab Status:  Final result Specimen:  Blood from Arm, Right Updated:  02/14/19 1143     Platelets 717 Thousands/uL      MPV 8 5 fL     D-Dimer [[id number]]  (Abnormal) Collected:  02/14/19 0552    Lab Status:  Final result Specimen:  Blood from Arm, Right Updated:  02/14/19 0659     D-Dimer, Quant 3,643 ng/ml (FEU)     Troponin I [[id number]]  (Normal) Collected:  02/14/19 0552    Lab Status:  Final result Specimen:  Blood from Arm, Right Updated:  02/14/19 0629     Troponin I <0 02 ng/mL     Comprehensive metabolic panel [[id number]]  (Abnormal) Collected:  02/14/19 0552    Lab Status:  Final result Specimen:  Blood from Arm, Right Updated:  02/14/19 8457     Sodium 141 mmol/L      Potassium 3 9 mmol/L      Chloride 101 mmol/L      CO2 28 mmol/L      ANION GAP 12 mmol/L      BUN 11 mg/dL      Creatinine 1 01 mg/dL      Glucose 167 mg/dL      Calcium 8 9 mg/dL      AST 14 U/L      ALT 19 U/L      Alkaline Phosphatase 76 U/L      Total Protein 6 6 g/dL      Albumin 3 1 g/dL      Total Bilirubin 0 80 mg/dL      eGFR 74 ml/min/1 73sq m     Narrative:       National Kidney Disease Education Program recommendations are as follows:  GFR calculation is accurate only with a steady state creatinine  Chronic Kidney disease less than 60 ml/min/1 73 sq  meters  Kidney failure less than 15 ml/min/1 73 sq  meters  Protime-INR [[id number]]  (Abnormal) Collected:  02/14/19 0552    Lab Status:  Final result Specimen:  Blood from Arm, Right Updated:  02/14/19 0620     Protime 15 1 seconds      INR 1 26    APTT [[id number]]  (Abnormal) Collected:  02/14/19 0552    Lab Status:  Final result Specimen:  Blood from Arm, Right Updated:  02/14/19 0620     PTT 40 seconds     CBC and differential [[id number]]  (Abnormal) Collected:  02/14/19 0552    Lab Status:  Final result Specimen:  Blood from Arm, Right Updated:  02/14/19 0601     WBC 10 29 Thousand/uL      RBC 3 40 Million/uL      Hemoglobin 9 4 g/dL      Hematocrit 30 3 %      MCV 89 fL      MCH 27 6 pg      MCHC 31 0 g/dL      RDW 15 9 %      MPV 8 8 fL      Platelets 412 Thousands/uL      nRBC 0 /100 WBCs      Neutrophils Relative 65 %      Immat GRANS % 1 %      Lymphocytes Relative 18 %      Monocytes Relative 10 %      Eosinophils Relative 5 %      Basophils Relative 1 %      Neutrophils Absolute 6 70 Thousands/µL      Immature Grans Absolute 0 08 Thousand/uL      Lymphocytes Absolute 1 86 Thousands/µL      Monocytes Absolute 1 07 Thousand/µL      Eosinophils Absolute 0 49 Thousand/µL      Basophils Absolute 0 09 Thousands/µL                  XR chest pa & lateral   Final Result by Steve Simmons DO (02/15 1223)   Resolving congestive heart failure and bilateral pleural effusions  Workstation performed: [id number]         CTA ED chest PE study   ED Interpretation by Ricarda Sierra DO (02/14 0741)   No central PE, bilateral moderate pleural effusion with interstitial edmea      Final Result by Analisa Zepeda MD (02/14 8744)      1  No PE     2   Moderate bibasilar pleural effusions with upper lung zone patchy central opacities concerning for asymmetric pulmonary edema/CHF  Infiltrates are not entirely excluded but considerably less likely                       Workstation performed: [id number]                    Procedures  ECG 12 Lead Documentation  Date/Time: 2/14/2019 5:56 AM  Performed by: Connor Cabrera MD  Authorized by: Connor Cabrera MD     Indications / Diagnosis:  Shortness of breath  ECG reviewed by me, the ED Provider: yes    Patient location:  ED  Previous ECG:     Previous ECG:  Compared to current    Comparison ECG info:  10/22/2003 normal sinus rhythm with nonspecific elevation in V2 and twave flattening in aVL    Similarity:  Changes noted (QTc now prolonged)  Interpretation:     Interpretation: abnormal    Rate:     ECG rate:  97    ECG rate assessment: normal    Rhythm:     Rhythm: sinus rhythm    Ectopy:     Ectopy: none    QRS:     QRS axis:  Left    QRS intervals:  Normal  Conduction:     Conduction: normal    ST segments:     ST segments:  Non-specific    Elevation:  V3 and V2  T waves:     T waves: flattening      Flattening:  AVL           Phone Contacts  ED Phone Contact    ED Course         HEART Risk Score      Most Recent Value   History  1 Filed at: 02/14/2019 0657   ECG  1 Filed at: 02/14/2019 1174   Age  2 Filed at: 02/14/2019 0657   Risk Factors  2 Filed at: 02/14/2019 0657   Troponin  0 Filed at: 02/14/2019 0657   Heart Score Risk Calculator   History  1 Filed at: 02/14/2019 0657   ECG  1 Filed at: 02/14/2019 5614   Age  2 Filed at: 02/14/2019 5455   Risk Factors  2 Filed at: 02/14/2019 0657   Troponin  0 Filed at: 02/14/2019 0657   HEART Score  6 Filed at: 02/14/2019 0657   HEART Score  6 Filed at: 02/14/2019 6877                            MDM  Number of Diagnoses or Management Options  Acute respiratory insufficiency: new and requires workup  Chest pain: new and requires workup  Hypoxia: new and requires workup  Prolonged Q-T interval on ECG: new and requires workup  Pulmonary edema: new and requires workup  Diagnosis management comments: 68year old male presents with chest pressure and shortness of breath  Recent left knee replacement  Negative duplex of the lower extremities 10 days ago  Patient requiring 2 L supplemental oxygen by nasal cannula to maintain normal oxygenation  Chest pain improved with nitro  EKG nonspecific with prolonged QTc  Avoid QT prolongating medications  Symptoms concerning for PE  However, CTA PE study negative for PE, but does show signs of pulmonary edema  Patient admitted for further evaluation and management of acute CHF  Amount and/or Complexity of Data Reviewed  Clinical lab tests: ordered and reviewed  Tests in the radiology section of CPT®: ordered and reviewed    Patient Progress  Patient progress: stable      Disposition  Final diagnoses:   Prolonged Q-T interval on ECG   Acute respiratory insufficiency   Chest pain   Hypoxia   Pulmonary edema     Time reflects when diagnosis was documented in both MDM as applicable and the Disposition within this note     Time User Action Codes Description Comment    2/14/2019  5:55 AM Jayla Adams Add [R94 31] Prolonged Q-T interval on ECG     2/14/2019  6:56 AM Marjorie Fothergill Add [R06 89] Acute respiratory insufficiency     2/14/2019  6:56 AM Marjorie Fothergill Add [R07 9] Chest pain     2/14/2019  8:23 AM Mari Desiree F Modify [R94 31] Prolonged Q-T interval on ECG     2/14/2019  8:23 AM Mari Desiree F Modify [R06 89] Acute respiratory insufficiency     2/14/2019  8:23 AM Mari Desiree F Add [R09 02] Hypoxia     2/14/2019  8:30 AM Mari Desiree F Add [J81 1] Pulmonary edema     2/14/2019  9:13 AM Jermaine Perez Add [I50 9] CHF (congestive heart failure) [facility])       ED Disposition     ED Disposition Condition Date/Time Comment    Admit Stable Thu Feb 14, 2019  8:23 AM Case was discussed with jus and the patient's admission status was agreed to be Admission Status: inpatient status to the service of Dr Bonilla Ang           Follow-up Information    None         Current Discharge Medication List      CONTINUE these medications which have NOT CHANGED    Details   ascorbic acid (VITAMIN C) 500 mg tablet Take 1 tablet (500 mg total) by mouth 2 (two) times a day  Qty: 60 tablet, Refills: 1    Associated Diagnoses: Primary localized osteoarthritis of left knee      aspirin (HM ASPIRIN) 325 mg tablet Take 1 tablet (325 mg total) by mouth 2 (two) times a day  Qty: 60 tablet, Refills: 0    Associated Diagnoses: Primary localized osteoarthritis of left knee      ferrous sulfate 324 (65 Fe) mg Take 1 tablet (324 mg total) by mouth 2 (two) times a day before meals  Qty: 60 tablet, Refills: 1    Associated Diagnoses: Primary localized osteoarthritis of left knee      folic acid (FOLVITE) 1 mg tablet Take 1 tablet (1 mg total) by mouth daily  Qty: 30 tablet, Refills: 1    Associated Diagnoses: Primary localized osteoarthritis of left knee      irbesartan (AVAPRO) 75 mg tablet Take 1 tablet (75 mg total) by mouth daily  Qty: 30 tablet, Refills: 1    Associated Diagnoses: Essential hypertension      metoprolol succinate (TOPROL-XL) 100 mg 24 hr tablet Take 1 tablet(s) every day by oral route for 90 days  Multiple Vitamin (MULTI-VITAMIN) tablet Take 1 tablet by mouth daily  Qty: 30 tablet, Refills: 1    Associated Diagnoses: Primary localized osteoarthritis of left knee      nitroglycerin (NITROSTAT) 0 3 mg SL tablet Nitrostat 0 3 mg sublingual tablet   Place 1 tablet as needed by sublingual route as needed for 90 days        omeprazole (PRILOSEC) 20 mg delayed release capsule Take 1 capsule by mouth Daily      oxyCODONE-acetaminophen (PERCOCET) 5-325 mg per tablet Take 1 tablet by mouth every 6 (six) hours as needed for moderate pain or severe painMax Daily Amount: 4 tablets  Qty: 45 tablet, Refills: 0    Associated Diagnoses: Aftercare following left knee joint replacement surgery      pravastatin (PRAVACHOL) 40 mg tablet take 1 tablet by mouth once daily at bedtime  Qty: 30 tablet, Refills: 6    Associated Diagnoses: Dyslipidemia      metFORMIN (GLUCOPHAGE-XR) 750 mg 24 hr tablet Take 1 tablet (750 mg total) by mouth daily with dinner for 30 days  Qty: 90 tablet, Refills: 2    Comments: 750 mg to replace 500 mg  Associated Diagnoses: Type 2 diabetes mellitus without complication, without long-term current use of insulin (HCC)           No discharge procedures on file      ED Provider  Electronically Signed by           Katelynn Vilchis MD  02/15/19 0398

## 2019-02-15 ENCOUNTER — TELEPHONE (OUTPATIENT)
Dept: FAMILY MEDICINE CLINIC | Facility: HOSPITAL | Age: 74
End: 2019-02-15

## 2019-02-15 ENCOUNTER — APPOINTMENT (INPATIENT)
Dept: NON INVASIVE DIAGNOSTICS | Facility: HOSPITAL | Age: 74
DRG: 291 | End: 2019-02-15
Payer: COMMERCIAL

## 2019-02-15 ENCOUNTER — TRANSITIONAL CARE MANAGEMENT (OUTPATIENT)
Dept: FAMILY MEDICINE CLINIC | Facility: HOSPITAL | Age: 74
End: 2019-02-15

## 2019-02-15 ENCOUNTER — APPOINTMENT (INPATIENT)
Dept: RADIOLOGY | Facility: HOSPITAL | Age: 74
DRG: 291 | End: 2019-02-15
Payer: COMMERCIAL

## 2019-02-15 VITALS
WEIGHT: 171 LBS | HEIGHT: 71 IN | HEART RATE: 75 BPM | BODY MASS INDEX: 23.94 KG/M2 | DIASTOLIC BLOOD PRESSURE: 74 MMHG | TEMPERATURE: 98 F | SYSTOLIC BLOOD PRESSURE: 155 MMHG | RESPIRATION RATE: 17 BRPM | OXYGEN SATURATION: 94 %

## 2019-02-15 PROBLEM — I50.33 ACUTE ON CHRONIC DIASTOLIC CONGESTIVE HEART FAILURE (HCC): Status: ACTIVE | Noted: 2019-02-14

## 2019-02-15 PROBLEM — J96.01 ACUTE RESPIRATORY FAILURE WITH HYPOXIA (HCC): Status: RESOLVED | Noted: 2019-02-14 | Resolved: 2019-02-15

## 2019-02-15 LAB
ALBUMIN SERPL BCP-MCNC: 2.9 G/DL (ref 3.5–5)
ALP SERPL-CCNC: 72 U/L (ref 46–116)
ALT SERPL W P-5'-P-CCNC: 16 U/L (ref 12–78)
ANION GAP SERPL CALCULATED.3IONS-SCNC: 9 MMOL/L (ref 4–13)
AST SERPL W P-5'-P-CCNC: 14 U/L (ref 5–45)
BILIRUB SERPL-MCNC: 0.6 MG/DL (ref 0.2–1)
BUN SERPL-MCNC: 13 MG/DL (ref 5–25)
CALCIUM SERPL-MCNC: 8.7 MG/DL (ref 8.3–10.1)
CHLORIDE SERPL-SCNC: 101 MMOL/L (ref 100–108)
CHOLEST SERPL-MCNC: 88 MG/DL (ref 50–200)
CO2 SERPL-SCNC: 32 MMOL/L (ref 21–32)
CREAT SERPL-MCNC: 0.95 MG/DL (ref 0.6–1.3)
ERYTHROCYTE [DISTWIDTH] IN BLOOD BY AUTOMATED COUNT: 16.1 % (ref 11.6–15.1)
GFR SERPL CREATININE-BSD FRML MDRD: 80 ML/MIN/1.73SQ M
GLUCOSE SERPL-MCNC: 132 MG/DL (ref 65–140)
GLUCOSE SERPL-MCNC: 147 MG/DL (ref 65–140)
GLUCOSE SERPL-MCNC: 153 MG/DL (ref 65–140)
HCT VFR BLD AUTO: 30.3 % (ref 36.5–49.3)
HDLC SERPL-MCNC: 22 MG/DL (ref 40–60)
HGB BLD-MCNC: 9.3 G/DL (ref 12–17)
LDLC SERPL CALC-MCNC: 44 MG/DL (ref 0–100)
MAGNESIUM SERPL-MCNC: 2.1 MG/DL (ref 1.6–2.6)
MCH RBC QN AUTO: 27 PG (ref 26.8–34.3)
MCHC RBC AUTO-ENTMCNC: 30.7 G/DL (ref 31.4–37.4)
MCV RBC AUTO: 88 FL (ref 82–98)
PLATELET # BLD AUTO: 575 THOUSANDS/UL (ref 149–390)
PMV BLD AUTO: 8.8 FL (ref 8.9–12.7)
POTASSIUM SERPL-SCNC: 3.5 MMOL/L (ref 3.5–5.3)
PROT SERPL-MCNC: 6.3 G/DL (ref 6.4–8.2)
RBC # BLD AUTO: 3.45 MILLION/UL (ref 3.88–5.62)
SODIUM SERPL-SCNC: 142 MMOL/L (ref 136–145)
TRIGL SERPL-MCNC: 108 MG/DL
WBC # BLD AUTO: 8.49 THOUSAND/UL (ref 4.31–10.16)

## 2019-02-15 PROCEDURE — 93306 TTE W/DOPPLER COMPLETE: CPT

## 2019-02-15 PROCEDURE — 82948 REAGENT STRIP/BLOOD GLUCOSE: CPT

## 2019-02-15 PROCEDURE — 71046 X-RAY EXAM CHEST 2 VIEWS: CPT

## 2019-02-15 PROCEDURE — 93306 TTE W/DOPPLER COMPLETE: CPT | Performed by: INTERNAL MEDICINE

## 2019-02-15 PROCEDURE — 83735 ASSAY OF MAGNESIUM: CPT | Performed by: INTERNAL MEDICINE

## 2019-02-15 PROCEDURE — 85027 COMPLETE CBC AUTOMATED: CPT | Performed by: INTERNAL MEDICINE

## 2019-02-15 PROCEDURE — 80053 COMPREHEN METABOLIC PANEL: CPT | Performed by: INTERNAL MEDICINE

## 2019-02-15 PROCEDURE — 80061 LIPID PANEL: CPT | Performed by: INTERNAL MEDICINE

## 2019-02-15 PROCEDURE — 99238 HOSP IP/OBS DSCHRG MGMT 30/<: CPT | Performed by: INTERNAL MEDICINE

## 2019-02-15 RX ORDER — DOCUSATE SODIUM 100 MG/1
100 CAPSULE, LIQUID FILLED ORAL 2 TIMES DAILY
Qty: 10 CAPSULE | Refills: 0 | Status: SHIPPED | OUTPATIENT
Start: 2019-02-15 | End: 2019-03-29 | Stop reason: ALTCHOICE

## 2019-02-15 RX ORDER — SPIRONOLACTONE 50 MG/1
50 TABLET, FILM COATED ORAL 2 TIMES DAILY
Refills: 0
Start: 2019-02-15 | End: 2019-02-19

## 2019-02-15 RX ORDER — FUROSEMIDE 20 MG/1
40 TABLET ORAL DAILY
Qty: 30 TABLET | Refills: 5 | Status: ON HOLD | OUTPATIENT
Start: 2019-02-15 | End: 2019-03-02 | Stop reason: SDUPTHER

## 2019-02-15 RX ORDER — POTASSIUM CHLORIDE 20 MEQ/1
20 TABLET, EXTENDED RELEASE ORAL DAILY
Qty: 30 TABLET | Refills: 1 | Status: SHIPPED | OUTPATIENT
Start: 2019-02-16 | End: 2019-03-11

## 2019-02-15 RX ADMIN — FOLIC ACID 1 MG: 1 TABLET ORAL at 08:17

## 2019-02-15 RX ADMIN — SPIRONOLACTONE 50 MG: 25 TABLET, FILM COATED ORAL at 08:17

## 2019-02-15 RX ADMIN — ENOXAPARIN SODIUM 30 MG: 30 INJECTION SUBCUTANEOUS at 08:17

## 2019-02-15 RX ADMIN — POTASSIUM CHLORIDE 20 MEQ: 1500 TABLET, EXTENDED RELEASE ORAL at 08:17

## 2019-02-15 RX ADMIN — OXYCODONE HYDROCHLORIDE AND ACETAMINOPHEN 500 MG: 500 TABLET ORAL at 08:17

## 2019-02-15 RX ADMIN — FERROUS SULFATE TAB 325 MG (65 MG ELEMENTAL FE) 325 MG: 325 (65 FE) TAB at 08:17

## 2019-02-15 RX ADMIN — Medication 1 TABLET: at 08:20

## 2019-02-15 RX ADMIN — ASPIRIN 325 MG ORAL TABLET 325 MG: 325 PILL ORAL at 08:17

## 2019-02-15 RX ADMIN — LOSARTAN POTASSIUM 100 MG: 50 TABLET, FILM COATED ORAL at 08:17

## 2019-02-15 RX ADMIN — PANTOPRAZOLE SODIUM 20 MG: 20 TABLET, DELAYED RELEASE ORAL at 06:32

## 2019-02-15 RX ADMIN — METOPROLOL SUCCINATE 100 MG: 50 TABLET, EXTENDED RELEASE ORAL at 08:17

## 2019-02-15 RX ADMIN — DOCUSATE SODIUM 100 MG: 100 CAPSULE, LIQUID FILLED ORAL at 08:17

## 2019-02-15 RX ADMIN — FUROSEMIDE 40 MG: 10 INJECTION, SOLUTION INTRAMUSCULAR; INTRAVENOUS at 08:17

## 2019-02-15 NOTE — UTILIZATION REVIEW
Initial Clinical Review    Admission: Date/Time/Statement: 2/14/19 @ 0824   Orders Placed This Encounter   Procedures    Inpatient Admission (expected length of stay for this patient Order details is greater than two midnights)     Standing Status:   Standing     Number of Occurrences:   1     Order Specific Question:   Admitting Physician     Answer:   Tan Asif [[id number]]     Order Specific Question:   Level of Care     Answer:   Med Surg [16]     Order Specific Question:   Estimated length of stay     Answer:   More than 2 Midnights     Order Specific Question:   Certification     Answer:   I certify that inpatient services are medically necessary for this patient for a duration of greater than two midnights  See H&P and MD Progress Notes for additional information about the patient's course of treatment  ED: Date/Time/Mode of Arrival:   ED Arrival Information     Expected Arrival Acuity Means of Arrival Escorted By Service Admission Type    - 2/14/2019 05:39 Emergent Walk-In Self General Medicine Emergency    Arrival Complaint    SHORTNESS OF BREATH        Chief Complaint:   Chief Complaint   Patient presents with    Chest Pain     Chest tightness started about 0500  took 1 nitro the pt states he felt better but it still hurt and hes SOB     History of Illness:     68YEAR OLD MALE PRESENTS WITH NEW ONSET OF shortness of breath and chest pressure  His medications, spironolactone and irbesartan were decreased related to hypotension following a recent total knee replacement  On arrival  CT shows bilateral pleural effusion  Consistent with CHF  No pulmonary embolism     ED Vital Signs:   02/14/19 0545 02/14/19 0542 02/14/19 0542 02/14/19 0542 02/14/19 0542   98 9 °F (37 2 °C) (!) 111 (!) 24 170/81 (!) 86 %         Pain Score       7       02/15/19 77 6 kg (171 lb)         2 LITERS O2 NC  97%        Pertinent Labs/Diagnostic Test Results:     Physical exam:  Murmur heard    Grade 2/6 systolic murmur in the aortic region and grade 1/6 diastolic murmur in the aortic region   Pulmonary/Chest: He is in respiratory distress  He has rales  Residual swelling and staples in place on the left knee total knee replacement incision   Minimal calf tenderness noted     Lab 02/14/19  0552   WBC 10 29*   HEMOGLOBIN 9 4*   HEMATOCRIT 30 3*   PLATELETS 441     CTA ED chest PE study    Moderate bibasilar pleural effusions with upper lung zone patchy central opacities concerning for asymmetric pulmonary edema/CHF   Infiltrates are not entirely excluded but considerably less likely       INR 1 26         ED Treatment:   Medication Administration from 02/14/2019 0539 to 02/14/2019 1519       Date/Time Order Dose Route     02/14/2019 0628 nitroglycerin (NITRO-BID) 2 % TD ointment 0 5 inch 0 5 inch Topical     02/14/2019 0827 furosemide (LASIX) injection 40 mg 40 mg Intravenous     02/14/2019 1129 potassium chloride (K-DUR,KLOR-CON) CR tablet 20 mEq 20 mEq Oral     02/14/2019 1129 docusate sodium (COLACE) capsule 100 mg 100 mg Oral     02/14/2019 1129 enoxaparin (LOVENOX) subcutaneous injection 30 mg 30 mg Subcutaneous        Past Medical/Surgical History:     Diagnosis    Basal cell carcinoma of skin    Coronary disease    Type 2 diabetes mellitus without complication, without long-term current use of insulin (HCC)    Dyslipidemia    Elevation of level of transaminase or lactic acid dehydrogenase (LDH)    Erectile dysfunction    Esophageal reflux    Fatty liver    Malignant lymphoma (HCC)    Malignant tumor of cecum ([address]    Multiple myeloma not having achieved remission ([address] )    Primary localized osteoarthritis of right knee    Paroxysmal atrial fibrillation ([address] )    Hx of CABG    Colitis    Essential hypertension    S/P total knee arthroplasty, left    Postoperative anemia due to acute blood loss    Aftercare following left knee joint replacement surgery         Admitting Diagnosis:   Shortness of breath [R06 02]  Acute respiratory insufficiency [R06 89]  CHF (congestive heart failure) ([facility]) [I50 9]  Chest pain [R07 9]  Pulmonary edema [J81 1]  Prolonged Q-T interval on ECG [R94 31]  Hypoxia [R09 02]    Age/Sex: 68 y o  male       Assessment/Plan:     Bilateral pleural effusion  Assessment & Plan  Suspect this is more due to CHF issues-will diurese with IV Lasix and restart patient's spironolactone doses     Acute respiratory failure with hypoxia ([facility] )  Assessment & Plan  Patient's O2 sat was 86% on room air on arrival currently on 2 L with improvement in sats greater than 92%   No sputum production noted   Had prior admission for pneumonia and pulmonary consult at that time    CHF (congestive heart failure) ([facility] [address] )  Assessment & Plan  Will check echo to assess systolic and diastolic function as well as valvular function to determine more clearly a cause  The patient is irrbesartan and spironolactone   had been decreased during hospital stay for knee replacement because of relative hypotension-blood pressure is now elevated and will resume at prior doses      Admission Orders:    Telemetry  Orthostatic blood pressure  PT and OT eval and treat     Medication Administration - last 24 hours from 02/14/2019 1432 to 02/15/2019 1432      02/15/2019 0817 furosemide (LASIX) injection 40 mg 40 mg Intravenous     02/14/2019 1732 furosemide (LASIX) injection 40 mg 40 mg Intravenous     02/15/2019 0817 spironolactone (ALDACTONE) tablet 50 mg 50 mg Oral     02/14/2019 1733 spironolactone (ALDACTONE) tablet 50 mg 50 mg Oral     02/15/2019 0817 enoxaparin (LOVENOX) subcutaneous injection 30 mg 30 mg Subcutaneous         acetaminophen 650 mg Oral Q4H PRN   ascorbic acid 500 mg Oral BID   aspirin 325 mg Oral BID   docusate sodium 100 mg Oral BID   enoxaparin 30 mg Subcutaneous Daily   ferrous sulfate 325 mg Oral BID With Meals   folic acid 1 mg Oral Daily   furosemide 40 mg Intravenous BID   insulin lispro 1-5 Units Subcutaneous HS losartan 100 mg Oral Daily   metoprolol succinate 100 mg Oral Daily   multivitamin-minerals 1 tablet Oral Daily   nitroglycerin 0 4 mg Sublingual Q5 Min PRN   ondansetron 4 mg Intravenous Q8H PRN   oxyCODONE-acetaminophen 1 tablet Oral Q6H PRN   pantoprazole 20 mg Oral Early Morning   potassium chloride 20 mEq Oral Daily   pravastatin 40 mg Oral HS   spironolactone 50 mg Oral BID

## 2019-02-15 NOTE — DISCHARGE SUMMARY
Discharge Summary - Sahara Márquez 68 y o  male MRN: [id number]    Unit/Bed#: [address] [place] [address] Encounter: [id number]    Admission Date: 2/14/2019     Admitting Diagnosis: Shortness of breath [R06 02]  Acute respiratory insufficiency [R06 89]  CHF (congestive heart failure) (HCC) [I50 9]  Chest pain [R07 9]  Pulmonary edema [J81 1]  Prolonged Q-T interval on ECG [R94 31]  Hypoxia [R09 02]    HPI:  78-year-old male admitted with shortness of breath and pulmonary edema  Consults    Procedures Performed:   Orders Placed This Encounter   Procedures    ED ECG Documentation Only       Hospital Course:  Patient had recent hospital stay for left total knee replacement and because of decreased blood pressure during that stay his diuretics were held  Patient follows with Cardiology at [facility] for his aortic valve replacement and prior CABG  Patient presented with shortness of breath and CT scan was done which did not show evidence of PE  He had initial decreased sats 86% but after diuresis overnight his O2 sats are now 94% on room air with resolution of the prior shortness of breath issues  Echocardiogram was ordered which shows normal left ventricular systolic function at 65% and likely this represents issues due to the diastolic dysfunction  Valve replacement function appears stable  Patient was placed under full admit initially however his symptoms resolved much quicker than we anticipated and therefore he is being discharged with less than a 2 midnight stay  Patient has diabetes mellitus and is metformin will be held until 2/17 a m   Given IV contrast load used for his initial CT    Significant Findings, Care, Treatment and Services Provided:  CTA - CHEST WITH IV CONTRAST - PULMONARY ANGIOGRAM     INDICATION:   positive dimer, shortness of breath, respiratory insufficiency requiring supplemental oxygen      COMPARISON: 11/25/2018     TECHNIQUE: CTA examination of the chest was performed using angiographic technique according to a protocol specifically tailored to evaluate for pulmonary embolism  Axial, sagittal, and coronal 2D reformatted images were created from the source data and   submitted for interpretation  In addition, coronal 3D MIP postprocessing was performed on the acquisition scanner        Radiation dose length product (DLP) for this visit:  460 57 mGy-cm   This examination, like all CT scans performed in the [facility], was performed utilizing techniques to minimize radiation dose exposure, including the use of iterative   reconstruction and automated exposure control      IV Contrast:  85 mL of iohexol (OMNIPAQUE)     FINDINGS:     PULMONARY ARTERIAL TREE:  No pulmonary embolus is seen       LUNGS:  Patchy central opacities predominantly involving the upper lungs are suspicious for asymmetric pulmonary edema  Infiltrates not excluded  Mild fissural fluid identified as well as mild interlobular septal thickening      PLEURA:  Moderate bibasilar pleural effusions noted      HEART/GREAT VESSELS:  Cardiac valvular prosthesis noted      MEDIASTINUM AND TERRI:  Unremarkable      CHEST WALL AND LOWER NECK:   Unremarkable      VISUALIZED STRUCTURES IN THE UPPER ABDOMEN:  Unremarkable      OSSEOUS STRUCTURES:  No acute fracture or destructive osseous lesion      IMPRESSION:     1  No PE  2   Moderate bibasilar pleural effusions with upper lung zone patchy central opacities concerning for asymmetric pulmonary edema/CHF    Infiltrates are not entirely excluded but considerably less likely         General appearance: alert and no distress  Neck: no adenopathy, no JVD, supple, symmetrical, trachea midline and thyroid not enlarged, symmetric, no tenderness/mass/nodules  Lungs: No rales or rhonchi  Heart: regular rate and rhythm and Crisp valve sounds  Abdomen: soft, non-tender; bowel sounds normal; no masses,  no organomegaly  Extremities: extremities normal, warm and well-perfused; no cyanosis, clubbing, or edema healing left knee incision without erythema or drainage  Skin: Skin color, texture, turgor normal  No rashes or lesions  Neurologic:  No focal motor deficits       Complications: none    Discharge Diagnosis: Principal Problem:    Acute on chronic diastolic congestive heart failure (HCC)  Active Problems:    Coronary disease    Bilateral pleural effusion    Type 2 diabetes mellitus without complication, without long-term current use of insulin (HCC)    Dyslipidemia    Malignant lymphoma (HCC)    Paroxysmal atrial fibrillation (HCC)    Hx of CABG    Essential hypertension    Postoperative anemia due to acute blood loss    History of aortic valve replacement with bioprosthetic valve        Condition at Discharge: good     Discharge instructions/Information to patient and family:   See after visit summary for information provided to patient and family  Provisions for Follow-Up Care:  See after visit summary for information related to follow-up care and any pertinent home health orders  Disposition: Home    Planned Readmission: No    Discharge Statement   I spent 30 minutes discharging the patient  This time was spent on the day of discharge  I had direct contact with the patient on the day of discharge  Discharge Medications:  See after visit summary for reconciled discharge medications provided to patient and family          Vicki Chamberlain DO

## 2019-02-15 NOTE — PHYSICAL THERAPY NOTE
PT screen  Order rec'd chart reviewed  Met with pt who reports he is amb with cane and attending out-pt PT for his TKR  Denies needs at this time  Had fluid overload that is resolved  Hoping for home d/c later today   SCreen only d/c PT

## 2019-02-15 NOTE — PROGRESS NOTES
CHF education given to patient and wife  CHF handbook given  Questions answered  Both verbalized understanding

## 2019-02-15 NOTE — H&P
H&P Exam - Roseanne Heath 68 y o  male MRN: [id number]    Unit/Bed#: [address] [place], [place] [id number] Encounter: [id number]  Roseanne Heath 1945, 68 y o  male MRN: [id number]     Unit/Bed#: ED 05 Encounter: [id number]     Primary Care Provider: Lei Nair DO   Date and time admitted to hospital: 2/14/2019  5:40 AM           Bilateral pleural effusion  Assessment & Plan  Suspect this is more due to CHF issues-will diurese with IV Lasix and restart patient's spironolactone doses     Acute respiratory failure with hypoxia ([facility] [address] )  Assessment & Plan  Patient's O2 sat was 86% on room air on arrival currently on 2 L with improvement in sats greater than 92%  No sputum production noted    Had prior admission for pneumonia and pulmonary consult at that time     Coronary disease  Assessment & Plan  With chest pain present prior to admission will check serial troponins and consult Cardiology     * CHF (congestive heart failure) ([facility] [address] )  Assessment & Plan  Will check echo to assess systolic and diastolic function as well as valvular function to determine more clearly a cause  The patient is irrbesartan and spironolactone   had been decreased during hospital stay for knee replacement because of relative hypotension-blood pressure is now elevated and will resume at prior doses     History of aortic valve replacement with bioprosthetic valve  Assessment & Plan  Follows with Cardiology at [facility] post prior bioprosthetic aortic valve and single-vessel CABG  Will check echo to assess valve functionality     Essential hypertension  Assessment & Plan  Elevated readings here in the  to 596X systolic-will increase usual med doses     Hx of CABG  Assessment & Plan  Patient unsure which vessel had been bypassed-will attempt to obtain records from prior Cardiology team     Paroxysmal atrial fibrillation [facility])  Assessment & Plan  Current EKG shows sinus rhythm with normal rates     Dyslipidemia  Assessment & Plan  Continue on Pravachol     Type 2 diabetes mellitus without complication, without long-term current use of insulin ([facility] [address] )  Assessment & Plan        Lab Results   Component Value Date     HGBA1C 7 0 (H) 01/08/2019         No results for input(s): POCGLU in the last 72 hours      Blood Sugar Average: Last 72 hrs:   will place on prudent diet and check q i d  Blood sugars           VTE Prophylaxis: ordered     Code Status: as above     Anticipated Length of Stay:  Greater than 2 midnight stay  Justification for Hospital Stay: see assessment and plan           Chief Complaint:  Shortness of breath and chest pain  History of Present Illness:     Lorrie Myles is a 68 y o  male who presents with onset in the middle the night with showed breathing difficulty and chest pressure  Patient does have a history of prior aortic valve replacement as well as a single bypass but he is uncertain which vessel was involved  He follows with Cardiology team at [facility]  He had a recent total knee replacement on the left done here with Dr Bindu Acosta several weeks ago and has been progressing well with that  On arrival to the ER CT scan was perform CTA did not show evidence of PE but did show bilateral pleural effusions and changes consistent with CHF  D-dimer was elevated  Patient's O2 sat was 86% on room room air on arrival and he was placed on O2 supplementation  During his recent hospital stay his spironolactone and irbesartan were decreased because of relative hypotension at that time            Review of Systems:     Review of Systems   Constitutional: Negative for fever  HENT: Negative for congestion  Respiratory:        Wife reports that he had pneumonia in November and some ongoing cough which is now improved and is not having any discolored sputum production   Gastrointestinal: Negative for abdominal distention and abdominal pain  Genitourinary: Positive for frequency     Musculoskeletal: Positive for joint swelling  Doing well with cane since recent left knee replacement   All other systems reviewed and are negative         Past Medical and Surgical History:      Medical History        Past Medical History:   Diagnosis Date    Atrial fibrillation ([facility] [address] )      Colitis      Diabetes mellitus ([facility] [address] )      Fatty liver      History of chemotherapy      History of radiation therapy      History of shingles 2018    Hypertension      Pneumonia              Surgical History         Past Surgical History:   Procedure Laterality Date    AORTIC VALVE REPLACEMENT        COLECTOMY        COLONOSCOPY        DENTAL SURGERY        KNEE SURGERY        LYMPHADENECTOMY        OTHER SURGICAL HISTORY         MAZE PROCEDURE    MS TOTAL KNEE ARTHROPLASTY Left 1/28/2019     Procedure: ARTHROPLASTY LEFT KNEE TOTAL;  Surgeon: Fuad Nolen MD;  Location: [facility] OR;  Service: Orthopedics            Meds/Allergies:     Prior to Admission Medications   Prescriptions Last Dose Informant Patient Reported? Taking?    Multiple Vitamin (MULTI-VITAMIN) tablet 2/13/2019 at Unknown time Self No Yes   Sig: Take 1 tablet by mouth daily   ascorbic acid (VITAMIN C) 500 mg tablet 2/13/2019 at Unknown time Self No Yes   Sig: Take 1 tablet (500 mg total) by mouth 2 (two) times a day   aspirin (HM ASPIRIN) 325 mg tablet 2/14/2019 at Unknown time Self No Yes   Sig: Take 1 tablet (325 mg total) by mouth 2 (two) times a day   ferrous sulfate 324 (65 Fe) mg 2/13/2019 at Unknown time Self No Yes   Sig: Take 1 tablet (324 mg total) by mouth 2 (two) times a day before meals   folic acid (FOLVITE) 1 mg tablet 2/13/2019 at Unknown time Self No Yes   Sig: Take 1 tablet (1 mg total) by mouth daily   irbesartan (AVAPRO) 75 mg tablet 2/13/2019 at Unknown time Self No Yes   Sig: Take 1 tablet (75 mg total) by mouth daily   metFORMIN (GLUCOPHAGE-XR) 750 mg 24 hr tablet     No No   Sig: Take 1 tablet (750 mg total) by mouth daily with dinner for 30 days metoprolol succinate (TOPROL-XL) 100 mg 24 hr tablet 2/13/2019 at Unknown time Self Yes Yes   Sig: Take 1 tablet(s) every day by oral route for 90 days  nitroglycerin (NITROSTAT) 0 3 mg SL tablet 2/13/2019 at Unknown time Self Yes Yes   Sig: Nitrostat 0 3 mg sublingual tablet   Place 1 tablet as needed by sublingual route as needed for 90 days  omeprazole (PRILOSEC) 20 mg delayed release capsule 2/13/2019 at Unknown time Self Yes Yes   Sig: Take 1 capsule by mouth Daily   oxyCODONE-acetaminophen (PERCOCET) 5-325 mg per tablet Past Week at Unknown time Self No Yes   Sig: Take 1 tablet by mouth every 6 (six) hours as needed for moderate pain or severe painMax Daily Amount: 4 tablets   pravastatin (PRAVACHOL) 40 mg tablet 2/13/2019 at Unknown time Self No Yes   Sig: take 1 tablet by mouth once daily at bedtime      Facility-Administered Medications: None         Allergies:         Allergies   Allergen Reactions    Ceftin [Cefuroxime] Itching         Social History:     Social History           Substance and Sexual Activity   Alcohol Use Yes     Comment: 5-6 beers a week      Social History           Tobacco Use   Smoking Status Former Smoker    Packs/day: 1 00    Years: 40 00    Pack years: 40 00    Types: Cigarettes    Last attempt to quit: 2009    Years since quitting: 10 1   Smokeless Tobacco Never Used      Social History          Substance and Sexual Activity   Drug Use No         Family History:           Family History   Problem Relation Age of Onset    Heart block Family      Coronary artery disease Mother      Thrombosis Mother      Diabetes Father      Hypertension Father      Colon cancer Paternal Grandfather           Physical Exam:      Vitals:   Blood Pressure: 167/82 (02/14/19 0845)  Pulse: 99 (02/14/19 0845)  Temperature: 98 9 °F (37 2 °C) (02/14/19 0545)  Temp Source: Temporal (02/14/19 0545)  Respirations: 20 (02/14/19 0845)  SpO2: 96 % (02/14/19 0845)     Physical Exam Constitutional: He appears well-developed and well-nourished  No distress  HENT:   Head: Normocephalic  Right Ear: External ear normal    Left Ear: External ear normal    Eyes: Right eye exhibits no discharge  Left eye exhibits no discharge  Neck: No tracheal deviation present  No thyromegaly present  Cardiovascular: Normal rate and regular rhythm  Murmur heard  Grade 2/6 systolic murmur in the aortic region and grade 1/6 diastolic murmur in the aortic region   Pulmonary/Chest: He is in respiratory distress  He has no wheezes  He has rales  Abdominal: Soft  Bowel sounds are normal  He exhibits no distension  There is no tenderness  Musculoskeletal: He exhibits no tenderness  Residual swelling and staples in place on the left knee total knee replacement incision  Minimal calf tenderness noted   Skin: No rash noted  No erythema  No pallor  Nursing note and vitals reviewed               Additional Data:      Lab Results: I personally reviewed them          Results from last 7 days   Lab Units 02/14/19  0552   WBC Thousand/uL 10 29*   HEMOGLOBIN g/dL 9 4*   HEMATOCRIT % 30 3*   PLATELETS Thousands/uL 564*   NEUTROS PCT % 65   LYMPHS PCT % 18   MONOS PCT % 10   EOS PCT % 5           Results from last 7 days   Lab Units 02/14/19  0552   POTASSIUM mmol/L 3 9   CHLORIDE mmol/L 101   CO2 mmol/L 28   BUN mg/dL 11   CREATININE mg/dL 1 01   CALCIUM mg/dL 8 9   ALK PHOS U/L 76   ALT U/L 19   AST U/L 14           Results from last 7 days   Lab Units 02/14/19  0552   INR   1 26*         Imaging: I personally reviewed them     CTA ED chest PE study   ED Interpretation by Linden Ortiz DO (02/14 2579)   No central PE, bilateral moderate pleural effusion with interstitial edmea       Final Result by Dilia Becerra MD (02/14 1224)       1  No PE  2   Moderate bibasilar pleural effusions with upper lung zone patchy central opacities concerning for asymmetric pulmonary edema/CHF    Infiltrates are not entirely excluded but considerably less likely                            Workstation performed: [id number]                 EKG : I personally reviewed        Ariel Lloyd DO                                                             Past Medical History:   Diagnosis Date    Atrial fibrillation ([facility] [address] )     Colitis     Diabetes mellitus ([facility] [address] )     Fatty liver     History of chemotherapy     History of radiation therapy     History of shingles 2018    Hypertension     Pneumonia      Past Surgical History:   Procedure Laterality Date    AORTIC VALVE REPLACEMENT      COLECTOMY      COLONOSCOPY      DENTAL SURGERY      KNEE SURGERY      LYMPHADENECTOMY      OTHER SURGICAL HISTORY      MAZE PROCEDURE    SC TOTAL KNEE ARTHROPLASTY Left 1/28/2019    Procedure: ARTHROPLASTY LEFT KNEE TOTAL;  Surgeon: Linda Gonzalez MD;  Location: [facility] OR;  Service: Orthopedics     Social History   Social History     Substance and Sexual Activity   Alcohol Use Yes    Comment: 5-6 beers a week     Social History     Substance and Sexual Activity   Drug Use No     Social History     Tobacco Use   Smoking Status Former Smoker    Packs/day: 1 00    Years: 40 00    Pack years: 40 00    Types: Cigarettes    Last attempt to quit: 2009    Years since quitting: 10 1   Smokeless Tobacco Never Used     Family History:   Family History   Problem Relation Age of Onset    Heart block Family     Coronary artery disease Mother     Thrombosis Mother     Diabetes Father     Hypertension Father     Colon cancer Paternal Grandfather        Meds/Allergies   PTA meds:   Prior to Admission Medications   Prescriptions Last Dose Informant Patient Reported? Taking?    Multiple Vitamin (MULTI-VITAMIN) tablet 2/13/2019 at Unknown time Self No Yes   Sig: Take 1 tablet by mouth daily   ascorbic acid (VITAMIN C) 500 mg tablet 2/13/2019 at Unknown time Self No Yes   Sig: Take 1 tablet (500 mg total) by mouth 2 (two) times a day   aspirin (HM ASPIRIN) 325 mg tablet 2/14/2019 at Unknown time Self No Yes   Sig: Take 1 tablet (325 mg total) by mouth 2 (two) times a day   ferrous sulfate 324 (65 Fe) mg 2/13/2019 at Unknown time Self No Yes   Sig: Take 1 tablet (324 mg total) by mouth 2 (two) times a day before meals   folic acid (FOLVITE) 1 mg tablet 2/13/2019 at Unknown time Self No Yes   Sig: Take 1 tablet (1 mg total) by mouth daily   irbesartan (AVAPRO) 75 mg tablet 2/13/2019 at Unknown time Self No Yes   Sig: Take 1 tablet (75 mg total) by mouth daily   metFORMIN (GLUCOPHAGE-XR) 750 mg 24 hr tablet   No No   Sig: Take 1 tablet (750 mg total) by mouth daily with dinner for 30 days   metoprolol succinate (TOPROL-XL) 100 mg 24 hr tablet 2/13/2019 at Unknown time Self Yes Yes   Sig: Take 1 tablet(s) every day by oral route for 90 days  nitroglycerin (NITROSTAT) 0 3 mg SL tablet 2/13/2019 at Unknown time Self Yes Yes   Sig: Nitrostat 0 3 mg sublingual tablet   Place 1 tablet as needed by sublingual route as needed for 90 days     omeprazole (PRILOSEC) 20 mg delayed release capsule 2/13/2019 at Unknown time Self Yes Yes   Sig: Take 1 capsule by mouth Daily   oxyCODONE-acetaminophen (PERCOCET) 5-325 mg per tablet Past Week at Unknown time Self No Yes   Sig: Take 1 tablet by mouth every 6 (six) hours as needed for moderate pain or severe painMax Daily Amount: 4 tablets   pravastatin (PRAVACHOL) 40 mg tablet 2/13/2019 at Unknown time Self No Yes   Sig: take 1 tablet by mouth once daily at bedtime      Facility-Administered Medications: None     Allergies   Allergen Reactions    Ceftin [Cefuroxime] Itching       Objective   First Vitals:   Blood Pressure: 170/81 (02/14/19 0542)  Pulse: (!) 111 (02/14/19 0542)  Temperature: 98 9 °F (37 2 °C) (02/14/19 0545)  Temp Source: Temporal (02/14/19 0545)  Respirations: (!) 24 (02/14/19 0542)  Height: 5' 11" (180 3 cm) (02/14/19 1520)  Weight - Scale: 81 6 kg (179 lb 14 3 oz) (02/14/19 7590)  SpO2: (!) 86 % (02/14/19 7631)    Current Vitals:   Blood Pressure: 134/67 (02/15/19 0016)  Pulse: 80 (02/15/19 0016)  Temperature: 98 3 °F (36 8 °C) (02/15/19 0016)  Temp Source: Oral (02/15/19 0016)  Respirations: 18 (02/15/19 0016)  Height: 5' 11" (180 3 cm) (02/14/19 1520)  Weight - Scale: 77 6 kg (171 lb) (02/15/19 0553)  SpO2: 98 % (02/15/19 0545)      Intake/Output Summary (Last 24 hours) at 2/15/2019 0735  Last data filed at 2/15/2019 0412  Gross per 24 hour   Intake 320 ml   Output 4665 ml   Net -4345 ml       Invasive Devices     Peripheral Intravenous Line            Peripheral IV 02/14/19 Antecubital 1 day

## 2019-02-15 NOTE — TELEPHONE ENCOUNTER
Pt was recently d/c from hospital   Scheduled with you for 2/19  Not entered as TCM since we already billed for a TCM in the last 30 days  Appt is in as 40 mins

## 2019-02-15 NOTE — SOCIAL WORK
informed Pt discharged today and had [facility] for SN/PT  Referral sent to [facility] for SN/PT via [facility] to resume services

## 2019-02-17 ENCOUNTER — TELEPHONE (OUTPATIENT)
Dept: OTHER | Facility: OTHER | Age: 74
End: 2019-02-17

## 2019-02-19 ENCOUNTER — OFFICE VISIT (OUTPATIENT)
Dept: FAMILY MEDICINE CLINIC | Facility: HOSPITAL | Age: 74
End: 2019-02-19
Payer: COMMERCIAL

## 2019-02-19 VITALS
OXYGEN SATURATION: 97 % | SYSTOLIC BLOOD PRESSURE: 98 MMHG | WEIGHT: 168 LBS | HEIGHT: 69 IN | BODY MASS INDEX: 24.88 KG/M2 | HEART RATE: 110 BPM | TEMPERATURE: 97.8 F | DIASTOLIC BLOOD PRESSURE: 64 MMHG

## 2019-02-19 DIAGNOSIS — I50.33 ACUTE ON CHRONIC DIASTOLIC CONGESTIVE HEART FAILURE (HCC): ICD-10-CM

## 2019-02-19 DIAGNOSIS — E11.9 TYPE 2 DIABETES MELLITUS WITHOUT COMPLICATION, WITHOUT LONG-TERM CURRENT USE OF INSULIN (HCC): ICD-10-CM

## 2019-02-19 DIAGNOSIS — I10 ESSENTIAL HYPERTENSION: ICD-10-CM

## 2019-02-19 DIAGNOSIS — Z09 HOSPITAL DISCHARGE FOLLOW-UP: Primary | ICD-10-CM

## 2019-02-19 DIAGNOSIS — R06.89 ACUTE RESPIRATORY INSUFFICIENCY: ICD-10-CM

## 2019-02-19 DIAGNOSIS — J96.01 ACUTE RESPIRATORY FAILURE WITH HYPOXIA (HCC): ICD-10-CM

## 2019-02-19 DIAGNOSIS — I95.9 SYMPTOMATIC HYPOTENSION: ICD-10-CM

## 2019-02-19 PROCEDURE — 99215 OFFICE O/P EST HI 40 MIN: CPT | Performed by: INTERNAL MEDICINE

## 2019-02-19 PROCEDURE — 3074F SYST BP LT 130 MM HG: CPT | Performed by: INTERNAL MEDICINE

## 2019-02-19 PROCEDURE — 3078F DIAST BP <80 MM HG: CPT | Performed by: INTERNAL MEDICINE

## 2019-02-19 PROCEDURE — 1111F DSCHRG MED/CURRENT MED MERGE: CPT | Performed by: INTERNAL MEDICINE

## 2019-02-19 PROCEDURE — 3008F BODY MASS INDEX DOCD: CPT | Performed by: INTERNAL MEDICINE

## 2019-02-19 PROCEDURE — 1160F RVW MEDS BY RX/DR IN RCRD: CPT | Performed by: INTERNAL MEDICINE

## 2019-02-19 RX ORDER — SPIRONOLACTONE 50 MG/1
50 TABLET, FILM COATED ORAL DAILY
Refills: 0
Start: 2019-02-19 | End: 2019-03-02 | Stop reason: HOSPADM

## 2019-02-19 RX ORDER — IRBESARTAN 75 MG/1
TABLET ORAL
Qty: 30 TABLET | Refills: 1
Start: 2019-02-19 | End: 2019-03-11

## 2019-02-19 NOTE — ASSESSMENT & PLAN NOTE
BP now low - poss to being over-diuresed - decrease Avapro to 75 mg 1/2 tab q day as well as decrease Spironolactone to 50 mg 1 tab q day (From bid), recheck BP in 3 wks

## 2019-02-19 NOTE — ASSESSMENT & PLAN NOTE
Lab Results   Component Value Date    HGBA1C 7 0 (H) 01/08/2019       No results for input(s): POCGLU in the last 72 hours      Blood Sugar Average: Last 72 hrs:160's  Sugars up despite not eating much - d/w pt and wife that pain/stress on body with surgery/stress with recent CHF can also make BS go up - con't current Metformin and call if sugars consistently > 200

## 2019-02-19 NOTE — ASSESSMENT & PLAN NOTE
Appears to be back to baseline and poss now over-diuresed with low BP and 10 lbs wgt loss - d/w pt and wife that his Spironolactone and Avapro were held d/t low BP after TKR and that caused CHF - we restarted Avapro at lower dose and Spironolactone was restarted but at a higher dose - now symptomatic, decrease Avapro further to 75 mg 1/2 tab daily and decrease Spironolactone to previous dose of 50 mg q day from bid, con't current lasix, is on K as well - check BMP on Friday, will try and send correspondence to pts Cardio as may need to be seen prior to Aug

## 2019-02-19 NOTE — ASSESSMENT & PLAN NOTE
Improved and O2 sat 97% - pt con't to state he feels SOB - d/w pt that if symptoms just with standing poss could be d/t his knee pain worsening with standing and giving him that SOB sensation as today his pulm exam is nml and sats are good - will try and tx his pain more aggressively - call with new/worse symptoms

## 2019-02-19 NOTE — PROGRESS NOTES
Assessment/Plan:    Acute on chronic diastolic congestive heart failure (HCC)  Appears to be back to baseline and poss now over-diuresed with low BP and 10 lbs wgt loss - d/w pt and wife that his Spironolactone and Avapro were held d/t low BP after TKR and that caused CHF - we restarted Avapro at lower dose and Spironolactone was restarted but at a higher dose - now symptomatic, decrease Avapro further to 75 mg 1/2 tab daily and decrease Spironolactone to previous dose of 50 mg q day from bid, con't current lasix, is on K as well - check BMP on Friday, will try and send correspondence to pts Cardio as may need to be seen prior to Aug    Acute respiratory failure with hypoxia ([email or web address] )  Improved and O2 sat 97% - pt con't to state he feels SOB - d/w pt that if symptoms just with standing poss could be d/t his knee pain worsening with standing and giving him that SOB sensation as today his pulm exam is nml and sats are good - will try and tx his pain more aggressively - call with new/worse symptoms    Type 2 diabetes mellitus without complication, without long-term current use of insulin ([facility] )  Lab Results   Component Value Date    HGBA1C 7 0 (H) 01/08/2019       No results for input(s): POCGLU in the last 72 hours  Blood Sugar Average: Last 72 hrs:160's  Sugars up despite not eating much - d/w pt and wife that pain/stress on body with surgery/stress with recent CHF can also make BS go up - con't current Metformin and call if sugars consistently > 200      Essential hypertension  BP now low - poss to being over-diuresed - decrease Avapro to 75 mg 1/2 tab q day as well as decrease Spironolactone to 50 mg 1 tab q day (From bid), recheck BP in 3 wks       Diagnoses and all orders for this visit:    Hospital discharge follow-up    Acute on chronic diastolic congestive heart failure ([email or web address] )  -     Basic metabolic panel;  Future  -     Basic metabolic panel    Acute respiratory failure with hypoxia (HCC)    Type 2 diabetes mellitus without complication, without long-term current use of insulin (HCC)  -     Basic metabolic panel; Future  -     Basic metabolic panel    Essential hypertension  -     irbesartan (AVAPRO) 75 mg tablet; 1/2 tab PO q day  -     Basic metabolic panel; Future  -     Basic metabolic panel    Symptomatic hypotension  Comments:  Again poss d/t overdiuresis - decrease Avapro and Spironolactone as noted above - has VNA coming tomorrow - will call with con't symptoms or with new/worse symp    Acute respiratory insufficiency  -     spironolactone (ALDACTONE) 50 mg tablet; Take 1 tablet (50 mg total) by mouth daily      I have spent 40 minutes with Bria Salazar and his wife today in which greater than 50% of this time was spent in counseling/coordination of care regarding Diagnostic results, Prognosis, Risks and benefits of tx options, Intructions for management, Patient and family education, Importance of tx compliance, Risk factor reductions and Impressions  Subjective:      Patient ID: Cy Dhillon is a 68 y o  male  HPI Pt here for  Hospital follow up  Pt was admitted to [facility] from 2/14/19 to 2/15/19  Records were reviewed by myself in detail and events are summarized below  Pt presented to [facility] ER on 2/14/19 with c/o chest tightness and SOB  He had had TKR recently  In the ED his HR was 111, resps 24, /81 and O2 sat was 86% on RA  Exam was nonfocal   BW was done and H/H 9 3/30 3 with plt up at 575, WBC was wnl  BNP was 3,494  Troponin was negative  D dimer was 3, 643  CMP with Glu 167 and Alb 3 1 but rest of panel was nml  ECG showed NS St changes in V2 and V3 and T wave flattening in aVL  CT PE protocal was done and no PE was noted  Moderate bibasilar pleural effusions with upper lung zone patchy central opacities concerning for asymmetric pulmonary edema/CHF  Infiltrates are not entirely excluded but considerably less likely    Pt was admitted for CHF and acute respiratory failure with hypoxia  He was put on O2 and given IV lasix  His Spironolactone was restarted and increased from 1 tab daily to bid  CXR was done 2/15/19 and showed resolving congestive heart failure and bilateral pleural effusions  Echo was done and EF was 60% and no regional wall motion abnormality was present  Mild MR was noted but no other significant valvular dz was present  Pt improved symptomatically and was discharged home 2/15/19  His Metformin was held untl 2/17/19 d/t the contrast given for CT  Med list was reviewed  Pt con't to feel terrible  He con't to note SOB with activity and has chest pressure when he stands up  He notes no symptoms at rest  He denies orthopnea/PND/edema  He states his knee pain is starting to improve but is still a 5/10  He has decrease in appetite and thinks he has lost 10 lbs since his knee surgery  His sugars were elevated in the mid 200's off metformin but since the metformin was restarted he is running in the 160's despite not eating much  He is checking his BP at home and it is low 114/74 to 99/61  HR is upper 90's to low 100's  He states he just feels horrible  He saw Ortho recently and has no f/u for 8 wks  He notes no redness/discharge from the incision  He does not see Cardio till Aug       Review of Systems   Constitutional: Positive for activity change, appetite change, fatigue and unexpected weight change  Negative for chills and fever  HENT: Positive for congestion  Negative for sinus pain  Eyes: Negative for pain and redness  Respiratory: Positive for shortness of breath  Negative for cough and chest tightness  Cardiovascular: Positive for chest pain  Negative for palpitations and leg swelling  Gastrointestinal: Negative for abdominal pain, blood in stool, constipation, diarrhea, nausea and vomiting  Endocrine: Negative for polydipsia and polyuria  Genitourinary: Negative for difficulty urinating and dysuria  Musculoskeletal: Positive for arthralgias  Negative for myalgias  Skin: Negative for rash and wound  Neurological: Negative for dizziness and headaches  Hematological: Does not bruise/bleed easily  Psychiatric/Behavioral: Negative for behavioral problems and confusion  Objective:    BP 98/64   Pulse (!) 110   Temp 97 8 °F (36 6 °C)   Ht 5' 9" (1 753 m)   Wt 76 2 kg (168 lb)   SpO2 97%   BMI 24 81 kg/m²      Physical Exam   Constitutional: He appears well-developed  He appears ill  No distress  thin   HENT:   Head: Normocephalic and atraumatic  Mouth/Throat: No oropharyngeal exudate  Eyes: Conjunctivae are normal  Right eye exhibits no discharge  Left eye exhibits no discharge  Neck: Neck supple  No JVD present  No tracheal deviation present  Cardiovascular: Normal rate and regular rhythm  No murmur heard  Pulmonary/Chest: Effort normal and breath sounds normal  No respiratory distress  He has no wheezes  He has no rales  Abdominal: Soft  He exhibits no distension  There is no tenderness  Musculoskeletal: He exhibits no deformity  Neurological: He is alert  He exhibits normal muscle tone  Skin: Skin is warm and dry  No rash noted  L knee incision clean/dry/intact, mild warmth but no significant erythema/drainage noted   Psychiatric: He has a normal mood and affect  His behavior is normal    Nursing note and vitals reviewed

## 2019-02-20 ENCOUNTER — TELEPHONE (OUTPATIENT)
Dept: FAMILY MEDICINE CLINIC | Facility: HOSPITAL | Age: 74
End: 2019-02-20

## 2019-02-20 NOTE — TELEPHONE ENCOUNTER
Patient currently on metoprolol 100mg daily    bp today is 106/60; nurse asking if dosing can be changed to 50mg am and 50mg pm so he's not getting all at once    Advised that dr Jose David Dee was not in today, nurse asked for answer from other provider so it was adressed today    pcb

## 2019-02-20 NOTE — TELEPHONE ENCOUNTER
Med changes just made yesterday - if BP not better - meaning higher - by next week call and will make further adjustments

## 2019-02-20 NOTE — TELEPHONE ENCOUNTER
No, continue with the same  He is on toprol and should not make a difference  Will send note back to dr Virginia Cordoba as well for further decision

## 2019-02-26 ENCOUNTER — TELEPHONE (OUTPATIENT)
Dept: FAMILY MEDICINE CLINIC | Facility: HOSPITAL | Age: 74
End: 2019-02-26

## 2019-02-27 ENCOUNTER — TELEPHONE (OUTPATIENT)
Dept: FAMILY MEDICINE CLINIC | Facility: HOSPITAL | Age: 74
End: 2019-02-27

## 2019-02-27 ENCOUNTER — APPOINTMENT (EMERGENCY)
Dept: CT IMAGING | Facility: HOSPITAL | Age: 74
DRG: 683 | End: 2019-02-27
Payer: COMMERCIAL

## 2019-02-27 ENCOUNTER — APPOINTMENT (EMERGENCY)
Dept: RADIOLOGY | Facility: HOSPITAL | Age: 74
DRG: 683 | End: 2019-02-27
Payer: COMMERCIAL

## 2019-02-27 ENCOUNTER — HOSPITAL ENCOUNTER (INPATIENT)
Facility: HOSPITAL | Age: 74
LOS: 3 days | Discharge: HOME HEALTH | DRG: 683 | End: 2019-03-02
Attending: EMERGENCY MEDICINE | Admitting: INTERNAL MEDICINE
Payer: COMMERCIAL

## 2019-02-27 DIAGNOSIS — R06.02 SHORTNESS OF BREATH: ICD-10-CM

## 2019-02-27 DIAGNOSIS — R07.9 CHEST PAIN: Primary | ICD-10-CM

## 2019-02-27 DIAGNOSIS — R65.10 SIRS (SYSTEMIC INFLAMMATORY RESPONSE SYNDROME) (HCC): ICD-10-CM

## 2019-02-27 DIAGNOSIS — Z96.652 S/P TOTAL KNEE ARTHROPLASTY, LEFT: ICD-10-CM

## 2019-02-27 DIAGNOSIS — R06.00 DYSPNEA: ICD-10-CM

## 2019-02-27 DIAGNOSIS — R06.89 ACUTE RESPIRATORY INSUFFICIENCY: ICD-10-CM

## 2019-02-27 DIAGNOSIS — R06.00 DYSPNEA, UNSPECIFIED TYPE: ICD-10-CM

## 2019-02-27 DIAGNOSIS — I50.32 CHRONIC DIASTOLIC CONGESTIVE HEART FAILURE (HCC): ICD-10-CM

## 2019-02-27 DIAGNOSIS — D72.829 LEUKOCYTOSIS: ICD-10-CM

## 2019-02-27 DIAGNOSIS — E87.2 LACTIC ACIDOSIS: ICD-10-CM

## 2019-02-27 PROBLEM — E87.20 LACTIC ACIDOSIS: Status: ACTIVE | Noted: 2019-02-27

## 2019-02-27 PROBLEM — E87.1 HYPONATREMIA: Status: ACTIVE | Noted: 2019-02-27

## 2019-02-27 PROBLEM — Z47.1 AFTERCARE FOLLOWING LEFT KNEE JOINT REPLACEMENT SURGERY: Status: RESOLVED | Noted: 2019-02-11 | Resolved: 2019-02-27

## 2019-02-27 PROBLEM — D62 POSTOPERATIVE ANEMIA DUE TO ACUTE BLOOD LOSS: Status: RESOLVED | Noted: 2019-01-29 | Resolved: 2019-02-27

## 2019-02-27 PROBLEM — J90 BILATERAL PLEURAL EFFUSION: Status: RESOLVED | Noted: 2019-02-14 | Resolved: 2019-02-27

## 2019-02-27 LAB
ALBUMIN SERPL BCP-MCNC: 4.3 G/DL (ref 3.5–5)
ALP SERPL-CCNC: 96 U/L (ref 46–116)
ALT SERPL W P-5'-P-CCNC: 21 U/L (ref 12–78)
ANION GAP SERPL CALCULATED.3IONS-SCNC: 12 MMOL/L (ref 4–13)
APTT PPP: 28 SECONDS (ref 26–38)
AST SERPL W P-5'-P-CCNC: 16 U/L (ref 5–45)
ATRIAL RATE: 102 BPM
BASOPHILS # BLD AUTO: 0.1 THOUSANDS/ΜL (ref 0–0.1)
BASOPHILS NFR BLD AUTO: 0 % (ref 0–1)
BILIRUB SERPL-MCNC: 0.6 MG/DL (ref 0.2–1)
BILIRUB UR QL STRIP: NEGATIVE
BUN SERPL-MCNC: 42 MG/DL (ref 5–25)
CALCIUM SERPL-MCNC: 9.6 MG/DL (ref 8.3–10.1)
CHLORIDE SERPL-SCNC: 89 MMOL/L (ref 100–108)
CLARITY UR: CLEAR
CO2 SERPL-SCNC: 31 MMOL/L (ref 21–32)
COLOR UR: YELLOW
CREAT SERPL-MCNC: 1.74 MG/DL (ref 0.6–1.3)
CRP SERPL QL: 20.9 MG/L
EOSINOPHIL # BLD AUTO: 0.03 THOUSAND/ΜL (ref 0–0.61)
EOSINOPHIL NFR BLD AUTO: 0 % (ref 0–6)
ERYTHROCYTE [DISTWIDTH] IN BLOOD BY AUTOMATED COUNT: 14.8 % (ref 11.6–15.1)
ERYTHROCYTE [SEDIMENTATION RATE] IN BLOOD: 32 MM/HOUR (ref 0–10)
EST. AVERAGE GLUCOSE BLD GHB EST-MCNC: 117 MG/DL
GFR SERPL CREATININE-BSD FRML MDRD: 38 ML/MIN/1.73SQ M
GLUCOSE SERPL-MCNC: 131 MG/DL (ref 65–140)
GLUCOSE SERPL-MCNC: 159 MG/DL (ref 65–140)
GLUCOSE SERPL-MCNC: 231 MG/DL (ref 65–140)
GLUCOSE UR STRIP-MCNC: NEGATIVE MG/DL
HBA1C MFR BLD: 5.7 % (ref 4.2–6.3)
HCT VFR BLD AUTO: 39.9 % (ref 36.5–49.3)
HGB BLD-MCNC: 12.9 G/DL (ref 12–17)
HGB UR QL STRIP.AUTO: NEGATIVE
IMM GRANULOCYTES # BLD AUTO: 0.12 THOUSAND/UL (ref 0–0.2)
IMM GRANULOCYTES NFR BLD AUTO: 1 % (ref 0–2)
INR PPP: 1.18 (ref 0.86–1.17)
KETONES UR STRIP-MCNC: NEGATIVE MG/DL
LACTATE SERPL-SCNC: 1.6 MMOL/L (ref 0.5–2)
LACTATE SERPL-SCNC: 3 MMOL/L (ref 0.5–2)
LEUKOCYTE ESTERASE UR QL STRIP: NEGATIVE
LYMPHOCYTES # BLD AUTO: 1.55 THOUSANDS/ΜL (ref 0.6–4.47)
LYMPHOCYTES NFR BLD AUTO: 7 % (ref 14–44)
MCH RBC QN AUTO: 27.4 PG (ref 26.8–34.3)
MCHC RBC AUTO-ENTMCNC: 32.3 G/DL (ref 31.4–37.4)
MCV RBC AUTO: 85 FL (ref 82–98)
MONOCYTES # BLD AUTO: 1.54 THOUSAND/ΜL (ref 0.17–1.22)
MONOCYTES NFR BLD AUTO: 7 % (ref 4–12)
NEUTROPHILS # BLD AUTO: 20.14 THOUSANDS/ΜL (ref 1.85–7.62)
NEUTS SEG NFR BLD AUTO: 85 % (ref 43–75)
NITRITE UR QL STRIP: NEGATIVE
NRBC BLD AUTO-RTO: 0 /100 WBCS
NT-PROBNP SERPL-MCNC: 416 PG/ML
P AXIS: 87 DEGREES
PH UR STRIP.AUTO: 6 [PH] (ref 4.5–8)
PLATELET # BLD AUTO: 484 THOUSANDS/UL (ref 149–390)
PMV BLD AUTO: 9.9 FL (ref 8.9–12.7)
POTASSIUM SERPL-SCNC: 4.5 MMOL/L (ref 3.5–5.3)
PR INTERVAL: 174 MS
PROCALCITONIN SERPL-MCNC: 0.48 NG/ML
PROT SERPL-MCNC: 7.6 G/DL (ref 6.4–8.2)
PROT UR STRIP-MCNC: NEGATIVE MG/DL
PROTHROMBIN TIME: 14.3 SECONDS (ref 11.8–14.2)
QRS AXIS: 249 DEGREES
QRSD INTERVAL: 124 MS
QT INTERVAL: 386 MS
QTC INTERVAL: 503 MS
RBC # BLD AUTO: 4.71 MILLION/UL (ref 3.88–5.62)
SODIUM SERPL-SCNC: 132 MMOL/L (ref 136–145)
SP GR UR STRIP.AUTO: 1.01 (ref 1–1.03)
T WAVE AXIS: 77 DEGREES
TROPONIN I SERPL-MCNC: <0.02 NG/ML
UROBILINOGEN UR QL STRIP.AUTO: 0.2 E.U./DL
VENTRICULAR RATE: 102 BPM
WBC # BLD AUTO: 23.48 THOUSAND/UL (ref 4.31–10.16)

## 2019-02-27 PROCEDURE — 93010 ELECTROCARDIOGRAM REPORT: CPT | Performed by: INTERNAL MEDICINE

## 2019-02-27 PROCEDURE — 83880 ASSAY OF NATRIURETIC PEPTIDE: CPT | Performed by: EMERGENCY MEDICINE

## 2019-02-27 PROCEDURE — 85025 COMPLETE CBC W/AUTO DIFF WBC: CPT | Performed by: EMERGENCY MEDICINE

## 2019-02-27 PROCEDURE — 85610 PROTHROMBIN TIME: CPT | Performed by: EMERGENCY MEDICINE

## 2019-02-27 PROCEDURE — 83605 ASSAY OF LACTIC ACID: CPT | Performed by: EMERGENCY MEDICINE

## 2019-02-27 PROCEDURE — 80053 COMPREHEN METABOLIC PANEL: CPT | Performed by: EMERGENCY MEDICINE

## 2019-02-27 PROCEDURE — 99285 EMERGENCY DEPT VISIT HI MDM: CPT

## 2019-02-27 PROCEDURE — 71250 CT THORAX DX C-: CPT

## 2019-02-27 PROCEDURE — 96365 THER/PROPH/DIAG IV INF INIT: CPT

## 2019-02-27 PROCEDURE — 93005 ELECTROCARDIOGRAM TRACING: CPT

## 2019-02-27 PROCEDURE — 83036 HEMOGLOBIN GLYCOSYLATED A1C: CPT | Performed by: INTERNAL MEDICINE

## 2019-02-27 PROCEDURE — 74176 CT ABD & PELVIS W/O CONTRAST: CPT

## 2019-02-27 PROCEDURE — 85730 THROMBOPLASTIN TIME PARTIAL: CPT | Performed by: EMERGENCY MEDICINE

## 2019-02-27 PROCEDURE — 84145 PROCALCITONIN (PCT): CPT | Performed by: INTERNAL MEDICINE

## 2019-02-27 PROCEDURE — 85652 RBC SED RATE AUTOMATED: CPT | Performed by: INTERNAL MEDICINE

## 2019-02-27 PROCEDURE — 36415 COLL VENOUS BLD VENIPUNCTURE: CPT | Performed by: EMERGENCY MEDICINE

## 2019-02-27 PROCEDURE — 82948 REAGENT STRIP/BLOOD GLUCOSE: CPT

## 2019-02-27 PROCEDURE — 86140 C-REACTIVE PROTEIN: CPT | Performed by: INTERNAL MEDICINE

## 2019-02-27 PROCEDURE — 87040 BLOOD CULTURE FOR BACTERIA: CPT | Performed by: EMERGENCY MEDICINE

## 2019-02-27 PROCEDURE — 81003 URINALYSIS AUTO W/O SCOPE: CPT | Performed by: EMERGENCY MEDICINE

## 2019-02-27 PROCEDURE — 84484 ASSAY OF TROPONIN QUANT: CPT | Performed by: EMERGENCY MEDICINE

## 2019-02-27 PROCEDURE — 71046 X-RAY EXAM CHEST 2 VIEWS: CPT

## 2019-02-27 PROCEDURE — 99223 1ST HOSP IP/OBS HIGH 75: CPT | Performed by: INTERNAL MEDICINE

## 2019-02-27 RX ORDER — ONDANSETRON 2 MG/ML
4 INJECTION INTRAMUSCULAR; INTRAVENOUS EVERY 8 HOURS PRN
Status: DISCONTINUED | OUTPATIENT
Start: 2019-02-27 | End: 2019-03-02 | Stop reason: HOSPADM

## 2019-02-27 RX ORDER — SODIUM CHLORIDE 9 MG/ML
75 INJECTION, SOLUTION INTRAVENOUS CONTINUOUS
Status: DISCONTINUED | OUTPATIENT
Start: 2019-02-27 | End: 2019-03-01

## 2019-02-27 RX ORDER — ASPIRIN 325 MG
325 TABLET ORAL DAILY
Status: DISCONTINUED | OUTPATIENT
Start: 2019-02-28 | End: 2019-03-02 | Stop reason: HOSPADM

## 2019-02-27 RX ORDER — ACETAMINOPHEN 325 MG/1
650 TABLET ORAL EVERY 6 HOURS PRN
Status: DISCONTINUED | OUTPATIENT
Start: 2019-02-27 | End: 2019-03-02 | Stop reason: HOSPADM

## 2019-02-27 RX ORDER — PRAVASTATIN SODIUM 40 MG
40 TABLET ORAL
Status: DISCONTINUED | OUTPATIENT
Start: 2019-02-27 | End: 2019-03-02 | Stop reason: HOSPADM

## 2019-02-27 RX ORDER — HEPARIN SODIUM 5000 [USP'U]/ML
5000 INJECTION, SOLUTION INTRAVENOUS; SUBCUTANEOUS EVERY 8 HOURS SCHEDULED
Status: DISCONTINUED | OUTPATIENT
Start: 2019-02-27 | End: 2019-03-02 | Stop reason: HOSPADM

## 2019-02-27 RX ORDER — OXYCODONE HYDROCHLORIDE AND ACETAMINOPHEN 5; 325 MG/1; MG/1
1 TABLET ORAL 2 TIMES DAILY PRN
COMMUNITY
End: 2019-03-29 | Stop reason: ALTCHOICE

## 2019-02-27 RX ORDER — INSULIN GLARGINE 100 [IU]/ML
8 INJECTION, SOLUTION SUBCUTANEOUS
Status: DISCONTINUED | OUTPATIENT
Start: 2019-02-27 | End: 2019-03-02 | Stop reason: HOSPADM

## 2019-02-27 RX ADMIN — PIPERACILLIN SODIUM,TAZOBACTAM SODIUM 3.38 G: 3; .375 INJECTION, POWDER, FOR SOLUTION INTRAVENOUS at 16:04

## 2019-02-27 RX ADMIN — HEPARIN SODIUM 5000 UNITS: 5000 INJECTION INTRAVENOUS; SUBCUTANEOUS at 21:56

## 2019-02-27 RX ADMIN — SODIUM CHLORIDE 125 ML/HR: 0.9 INJECTION, SOLUTION INTRAVENOUS at 18:56

## 2019-02-27 RX ADMIN — VANCOMYCIN HYDROCHLORIDE 1500 MG: 1 INJECTION, POWDER, LYOPHILIZED, FOR SOLUTION INTRAVENOUS at 18:56

## 2019-02-27 RX ADMIN — INSULIN GLARGINE 8 UNITS: 100 INJECTION, SOLUTION SUBCUTANEOUS at 21:56

## 2019-02-27 RX ADMIN — PRAVASTATIN SODIUM 40 MG: 40 TABLET ORAL at 21:56

## 2019-02-27 RX ADMIN — SODIUM CHLORIDE 500 ML: 0.9 INJECTION, SOLUTION INTRAVENOUS at 15:43

## 2019-02-27 RX ADMIN — SODIUM CHLORIDE 500 ML: 0.9 INJECTION, SOLUTION INTRAVENOUS at 15:44

## 2019-02-27 NOTE — ASSESSMENT & PLAN NOTE
Patient complains of chest pressure and dyspnea when standing or walking relieved by laying down or sitting down  This is a positional type of dyspnea  CT scan the chest shows no evidence of pericardial effusion, pleural effusion, mediastinal mass, pneumonia  On physical examination he has no volume overload suggestive of acute CHF  EKG shows sinus rhythm with intraventricular block  The etiology of his position all chest pressure and dyspnea is elusive at present  Will ask Cardiology to comment on this

## 2019-02-27 NOTE — ED PROVIDER NOTES
History  Chief Complaint   Patient presents with    Shortness of Breath     Patient states it is hard to take a deep breathe       History provided by:  Patient   used: No    Shortness of Breath   Associated symptoms: chest pain    Associated symptoms: no abdominal pain, no cough, no diaphoresis, no fever, no headaches, no neck pain, no rash, no sore throat, no vomiting and no wheezing      Patient is a 67-year-old male presenting to emergency department with chest pressure and shortness of breath , goes last week  Was admitted to the hospital 12 days ago for CHF  No fevers or chills  No cough  No nausea vomiting  No abdominal pain  Has constipation  Patient is taking opiate pain medications due to knee replacement 4 weeks ago pain  During last admission had CTA done for blood clots, did not show any blood clots  No calf pain or swelling  History of CAD  Bypass surgery  History of valve replacement  Has murmur  This is old  MDM cardiac eval, chest x-ray, re-evaluate      Prior to Admission Medications   Prescriptions Last Dose Informant Patient Reported? Taking?    Multiple Vitamin (MULTI-VITAMIN) tablet 2/27/2019 at Unknown time Self No Yes   Sig: Take 1 tablet by mouth daily   Multiple Vitamins-Minerals (ICAPS AREDS 2) CAPS 2/27/2019 at Unknown time  Yes Yes   Sig: Take 1 capsule by mouth daily   ascorbic acid (VITAMIN C) 500 mg tablet 2/27/2019 at Unknown time Self No Yes   Sig: Take 1 tablet (500 mg total) by mouth 2 (two) times a day   Patient taking differently: Take 500 mg by mouth daily    aspirin (HM ASPIRIN) 325 mg tablet 2/27/2019 at Unknown time Self No Yes   Sig: Take 1 tablet (325 mg total) by mouth 2 (two) times a day   Patient taking differently: Take 325 mg by mouth daily    docusate sodium (COLACE) 100 mg capsule Not Taking at Unknown time  No No   Sig: Take 1 capsule (100 mg total) by mouth 2 (two) times a day   Patient not taking: Reported on 2/27/2019 ferrous sulfate 324 (65 Fe) mg 2019 at Unknown time Self No Yes   Sig: Take 1 tablet (324 mg total) by mouth 2 (two) times a day before meals   Patient taking differently: Take 324 mg by mouth daily before breakfast    folic acid (FOLVITE) 1 mg tablet 2019 at Unknown time Self No Yes   Sig: Take 1 tablet (1 mg total) by mouth daily   furosemide (LASIX) 20 mg tablet 2019 at Unknown time  No Yes   Sig: Take 2 tablets (40 mg total) by mouth daily   irbesartan (AVAPRO) 75 mg tablet 2019 at Unknown time  No Yes   Si/2 tab PO q day   metFORMIN (GLUCOPHAGE-XR) 750 mg 24 hr tablet 2019 at Unknown time  No Yes   Sig: Take 1 tablet (750 mg total) by mouth daily with dinner for 30 days   Patient taking differently: Take 750 mg by mouth daily with breakfast    metoprolol succinate (TOPROL-XL) 100 mg 24 hr tablet 2019 at Unknown time Self Yes Yes   Sig: Take 0 5 tablets by mouth daily   nitroglycerin (NITROSTAT) 0 3 mg SL tablet Past Month at Unknown time Self Yes Yes   Sig: Nitrostat 0 3 mg sublingual tablet   Place 1 tablet as needed by sublingual route as needed for 90 days     omeprazole (PRILOSEC) 20 mg delayed release capsule 2019 at Unknown time Self Yes Yes   Sig: Take 1 capsule by mouth Daily   oxyCODONE-acetaminophen (PERCOCET) 5-325 mg per tablet 2019 at Unknown time  Yes Yes   Sig: Take 1 tablet by mouth 2 (two) times a day as needed for moderate pain   potassium chloride (K-DUR,KLOR-CON) 20 mEq tablet 2019 at Unknown time  No Yes   Sig: Take 1 tablet (20 mEq total) by mouth daily   pravastatin (PRAVACHOL) 40 mg tablet 2019 at Unknown time Self No Yes   Sig: take 1 tablet by mouth once daily at bedtime   spironolactone (ALDACTONE) 50 mg tablet 2019 at Unknown time  No Yes   Sig: Take 1 tablet (50 mg total) by mouth daily   Patient taking differently: Take 25 mg by mouth daily       Facility-Administered Medications: None       Past Medical History: Diagnosis Date    [facility] fibrillation [facility] [address] [facility]     Cancer ([facility] [address] [facility]     Colitis     Fatty liver     History of chemotherapy     History of radiation therapy     History of shingles 2018    Pneumonia        Past Surgical History:   Procedure Laterality Date    AORTIC VALVE REPLACEMENT      COLECTOMY      COLONOSCOPY      DENTAL SURGERY      KNEE SURGERY      LYMPHADENECTOMY      OTHER SURGICAL HISTORY      MAZE PROCEDURE    KS TOTAL KNEE ARTHROPLASTY Left 1/28/2019    Procedure: ARTHROPLASTY LEFT KNEE TOTAL;  Surgeon: Fuad Nolen MD;  Location: [facility] OR;  Service: Orthopedics       Family History   Problem Relation Age of Onset    Heart block Family     Coronary artery disease Mother     Thrombosis Mother     Diabetes Father     Hypertension Father     Colon cancer Paternal Grandfather      I have reviewed and agree with the history as documented  Social History     Tobacco Use    Smoking status: Former Smoker     Packs/day: 1 00     Years: 40 00     Pack years: 40 00     Types: Cigarettes     Last attempt to quit: 2009     Years since quitting: 10 1    Smokeless tobacco: Never Used   Substance Use Topics    Alcohol use: Yes     Frequency: Monthly or less     Drinks per session: 1 or 2     Binge frequency: Never     Comment: has not drank in over a month    Drug use: No        Review of Systems   Constitutional: Negative for chills, diaphoresis and fever  HENT: Negative for congestion and sore throat  Respiratory: Positive for shortness of breath  Negative for cough, wheezing and stridor  Cardiovascular: Positive for chest pain  Negative for palpitations and leg swelling  Gastrointestinal: Positive for constipation  Negative for abdominal pain, blood in stool, diarrhea, nausea and vomiting  Genitourinary: Negative for dysuria, frequency and urgency  Musculoskeletal: Negative for neck pain and neck stiffness  Skin: Negative for pallor and rash     Neurological: Negative for dizziness, syncope, weakness, light-headedness and headaches  All other systems reviewed and are negative  Physical Exam  Physical Exam   Constitutional: He is oriented to person, place, and time  He appears well-developed and well-nourished  HENT:   Head: Normocephalic and atraumatic  Eyes: Pupils are equal, round, and reactive to light  Neck: Neck supple  Cardiovascular: Normal rate, regular rhythm, normal heart sounds and intact distal pulses  Pulmonary/Chest: Effort normal and breath sounds normal  No respiratory distress  Abdominal: Soft  Bowel sounds are normal  There is no tenderness  Musculoskeletal:   Minimal swelling of the left knee  Sutures intact  No erythema  No drainage   Neurological: He is alert and oriented to person, place, and time  Skin: Skin is warm and dry  Capillary refill takes less than 2 seconds  Vitals reviewed        Vital Signs  ED Triage Vitals   Temperature Pulse Respirations Blood Pressure SpO2   02/27/19 1445 02/27/19 1415 02/27/19 1415 02/27/19 1415 02/27/19 1410   98 7 °F (37 1 °C) 105 22 122/58 97 %      Temp Source Heart Rate Source Patient Position - Orthostatic VS BP Location FiO2 (%)   02/27/19 1445 02/27/19 1415 02/27/19 1415 02/27/19 1415 --   Temporal Monitor Lying Right arm       Pain Score       02/27/19 1433       6           Vitals:    02/27/19 1530 02/27/19 1615 02/27/19 1630 02/27/19 1723   BP: 107/56 117/58 112/59 120/58   Pulse: 96 90 87 90   Patient Position - Orthostatic VS: Lying Lying Lying Lying       Visual Acuity      ED Medications  Medications   aspirin tablet 325 mg (has no administration in time range)   pravastatin (PRAVACHOL) tablet 40 mg (has no administration in time range)   ondansetron (ZOFRAN) injection 4 mg (has no administration in time range)   sodium chloride 0 9 % infusion (has no administration in time range)   heparin (porcine) subcutaneous injection 5,000 Units (has no administration in time range)   insulin lispro (HumaLOG) 100 units/mL subcutaneous injection 3 Units (has no administration in time range)   insulin glargine (LANTUS) subcutaneous injection 8 Units 0 08 mL (has no administration in time range)   insulin lispro (HumaLOG) 100 units/mL subcutaneous injection 1-5 Units (has no administration in time range)   acetaminophen (TYLENOL) tablet 650 mg (has no administration in time range)   vancomycin (VANCOCIN) 1,500 mg in sodium chloride 0 9 % 250 mL IVPB (has no administration in time range)   sodium chloride 0 9 % bolus 500 mL (0 mL Intravenous Stopped 2/27/19 1701)   sodium chloride 0 9 % bolus 500 mL (0 mL Intravenous Stopped 2/27/19 1701)   piperacillin-tazobactam (ZOSYN) 3 375 g in sodium chloride 0 9 % 50 mL IVPB (0 g Intravenous Stopped 2/27/19 1701)       Diagnostic Studies  Results Reviewed     Procedure Component Value Units Date/Time    Lactic acid, plasma [[id number]]  (Normal) Collected:  02/27/19 1703    Lab Status:  Final result Specimen:  Blood from Arm, Right Updated:  02/27/19 1742     LACTIC ACID 1 6 mmol/L     Narrative:       Result may be elevated if tourniquet was used during collection  Blood culture #2 [[id number]] Collected:  02/27/19 1544    Lab Status: In process Specimen:  Blood from Arm, Left Updated:  02/27/19 1553    Blood culture #1 [[id number]] Collected:  02/27/19 1545    Lab Status: In process Specimen:  Blood from Arm, Right Updated:  02/27/19 1553    Lactic acid, plasma [[id number]]  (Abnormal) Collected:  02/27/19 1454    Lab Status:  Final result Specimen:  Blood from Arm, Right Updated:  02/27/19 1530     LACTIC ACID 3 0 mmol/L     Narrative:       Result may be elevated if tourniquet was used during collection      B-type natriuretic peptide [[id number]]  (Abnormal) Collected:  02/27/19 1454    Lab Status:  Final result Specimen:  Blood from Arm, Right Updated:  02/27/19 1527     NT-proBNP 416 pg/mL     APTT [[id number]]  (Normal) Collected:  02/27/19 1454    Lab Status: Final result Specimen:  Blood from Arm, Right Updated:  02/27/19 1518     PTT 28 seconds     Protime-INR [[id number]]  (Abnormal) Collected:  02/27/19 1454    Lab Status:  Final result Specimen:  Blood from Arm, Right Updated:  02/27/19 1518     Protime 14 3 seconds      INR 1 18    UA w Reflex to Microscopic w Reflex to Culture [[id number]] Collected:  02/27/19 1501    Lab Status:  Final result Specimen:  Urine, Clean Catch Updated:  02/27/19 1516     Color, UA Yellow     Clarity, UA Clear     Specific Gravity, UA 1 015     pH, UA 6 0     Leukocytes, UA Negative     Nitrite, UA Negative     Protein, UA Negative mg/dl      Glucose, UA Negative mg/dl      Ketones, UA Negative mg/dl      Urobilinogen, UA 0 2 E U /dl      Bilirubin, UA Negative     Blood, UA Negative    Troponin I [[id number]]  (Normal) Collected:  02/27/19 1424    Lab Status:  Final result Specimen:  Blood from Arm, Left Updated:  02/27/19 1452     Troponin I <0 02 ng/mL     Comprehensive metabolic panel [[id number]]  (Abnormal) Collected:  02/27/19 1424    Lab Status:  Final result Specimen:  Blood from Arm, Left Updated:  02/27/19 1450     Sodium 132 mmol/L      Potassium 4 5 mmol/L      Chloride 89 mmol/L      CO2 31 mmol/L      ANION GAP 12 mmol/L      BUN 42 mg/dL      Creatinine 1 74 mg/dL      Glucose 231 mg/dL      Calcium 9 6 mg/dL      AST 16 U/L      ALT 21 U/L      Alkaline Phosphatase 96 U/L      Total Protein 7 6 g/dL      Albumin 4 3 g/dL      Total Bilirubin 0 60 mg/dL      eGFR 38 ml/min/1 73sq m     Narrative:       National Kidney Disease Education Program recommendations are as follows:  GFR calculation is accurate only with a steady state creatinine  Chronic Kidney disease less than 60 ml/min/1 73 sq  meters  Kidney failure less than 15 ml/min/1 73 sq  meters      CBC and differential [[id number]]  (Abnormal) Collected:  02/27/19 1424    Lab Status:  Final result Specimen:  Blood from Arm, Left Updated:  02/27/19 1433     WBC 23 48 Thousand/uL      RBC 4 71 Million/uL      Hemoglobin 12 9 g/dL      Hematocrit 39 9 %      MCV 85 fL      MCH 27 4 pg      MCHC 32 3 g/dL      RDW 14 8 %      MPV 9 9 fL      Platelets 844 Thousands/uL      nRBC 0 /100 WBCs      Neutrophils Relative 85 %      Immat GRANS % 1 %      Lymphocytes Relative 7 %      Monocytes Relative 7 %      Eosinophils Relative 0 %      Basophils Relative 0 %      Neutrophils Absolute 20 14 Thousands/µL      Immature Grans Absolute 0 12 Thousand/uL      Lymphocytes Absolute 1 55 Thousands/µL      Monocytes Absolute 1 54 Thousand/µL      Eosinophils Absolute 0 03 Thousand/µL      Basophils Absolute 0 10 Thousands/µL                  CT chest abdomen pelvis wo contrast   Final Result by Amna Evans MD (02/27 1618)      Proctocolitis  No noncontrast CT abnormality to account for the patient's chest pain  Mild nonspecific perinephric stranding  No obstructive uropathy  Normal distention of the urinary bladder noted  Workstation performed: [id number]         X-ray chest 2 views   Final Result by Sammy Garcia MD (02/27 1537)      No evidence of acute abnormality in the chest             Workstation performed: [id number]                    Procedures  Procedures       Phone Contacts  ED Phone Contact    ED Course  ED Course as of Feb 27 1842   Wed Feb 27, 2019   1444 ECG shows rate of 1 2, sinus tach, northwest axis, wide qrs, this is old no significant ST or T-wave changes, qtc 503, independently interpreted by me                      Initial Sepsis Screening     Row Name 02/27/19 1534                Is the patient's history suggestive of a new or worsening infection? Yes (Proceed)  (Abnormal)   -AT        Suspected source of infection  suspect infection, source unknown  -AT        Are two or more of the following signs & symptoms of infection both present and new to the patient?   Yes (Proceed)  (Abnormal)   -AT        Indicate SIRS criteria  Tachycardia > 90 bpm;Hyperthemia > 38 3C (100 9F)  -AT        If the answer is yes to both questions, suspicion of sepsis is present          If severe sepsis is present AND tissue hypoperfusion perists in the hour after fluid resuscitation or lactate > 4, the patient meets criteria for SEPTIC SHOCK          Are any of the following organ dysfunction criteria present within 6 hours of suspected infection and SIRS criteria that are NOT considered to be chronic conditions? Yes  (Abnormal)   -AT        Organ dysfunction  Lactate > 2 0 mmol/L  -AT        Date of presentation of severe sepsis          Time of presentation of severe sepsis          Tissue hypoperfusion persists in the hour after crystalloid fluid administration, evidenced, by either:          Was hypotension present within one hour of the conclusion of crystalloid fluid administration?           Date of presentation of septic shock          Time of presentation of septic shock            User Key  (r) = Recorded By, (t) = Taken By, (c) = Cosigned By    Initials Name Provider Type    AT MD Physician Ryan Mcadams Criteria for PE      Most Recent Value   Sam' Criteria for PE   Clinical signs and symptoms of DVT  0 Filed at: 02/27/2019 1440   PE is primary diagnosis or equally likely  0 Filed at: 02/27/2019 1440   HR >100  1 5 Filed at: 02/27/2019 1440   Immobilization at least 3 days or Surgery in the previous 4 weeks  1 5 Filed at: 02/27/2019 1440   Previous, objectively diagnosed PE or DVT  0 Filed at: 02/27/2019 1440   Hemoptysis  0 Filed at: 02/27/2019 1440   Malignancy with treatment within 6 months or palliative  0 Filed at: 02/27/2019 1440   Sam' Criteria Total  3 Filed at: 02/27/2019 1440            MDM    Disposition  Final diagnoses:   Chest pain   SIRS (systemic inflammatory response syndrome) ([facility] [address] )   Lactic acidosis     Time reflects when diagnosis was documented in both MDM as applicable and the Disposition within this note Time User Action Codes Description Comment    2/27/2019  4:29 PM Albermaciel Jaz Add [R07 9] Chest pain     2/27/2019  4:29 PM aFy Jaz Add [R65 10] SIRS (systemic inflammatory response syndrome) ([facility] [address] )     2/27/2019  4:29 PM Jonathanchaspablo Jaz Add [E87 2] Lactic acidosis     2/27/2019  5:12 PM Berces, [place] G Modify [E87 2] Lactic acidosis     2/27/2019  5:12 PM Berces, Shashi Cutting Modify [E87 2] Lactic acidosis     2/27/2019  5:12 PM Berces, Shashi Cutting Add [D72 829] Leukocytosis     2/27/2019  5:12 PM Berces, Shashi Cutting Modify [E87 2] Lactic acidosis     2/27/2019  5:12 PM Berces, Shashi Cutting Modify [E87 2] Lactic acidosis     2/27/2019  5:12 PM Berces, Shashi Cutting Modify [E87 2] Lactic acidosis     2/27/2019  5:12 PM Nicci Ordoñez Add [O49 917] S/P total knee arthroplasty, left     2/27/2019  5:12 PM Berces, Shashi Cutting Modify [E87 2] Lactic acidosis     2/27/2019  5:13 PM Nicci Huh Modify [E87 2] Lactic acidosis       ED Disposition     ED Disposition Condition Date/Time Comment    Admit Stable Wed Feb 27, 2019  4:28 PM Case was discussed with Dr Yamini Christiansen and the patient's admission status was agreed to be Admission Status: inpatient status to the service of Dr Yamini Christiansen           Follow-up Information    None         Current Discharge Medication List      CONTINUE these medications which have NOT CHANGED    Details   ascorbic acid (VITAMIN C) 500 mg tablet Take 1 tablet (500 mg total) by mouth 2 (two) times a day  Qty: 60 tablet, Refills: 1    Associated Diagnoses: Primary localized osteoarthritis of left knee      aspirin (HM ASPIRIN) 325 mg tablet Take 1 tablet (325 mg total) by mouth 2 (two) times a day  Qty: 60 tablet, Refills: 0    Associated Diagnoses: Primary localized osteoarthritis of left knee      ferrous sulfate 324 (65 Fe) mg Take 1 tablet (324 mg total) by mouth 2 (two) times a day before meals  Qty: 60 tablet, Refills: 1    Associated Diagnoses: Primary localized osteoarthritis of left knee      folic acid (FOLVITE) 1 mg tablet Take 1 tablet (1 mg total) by mouth daily  Qty: 30 tablet, Refills: 1    Associated Diagnoses: Primary localized osteoarthritis of left knee      furosemide (LASIX) 20 mg tablet Take 2 tablets (40 mg total) by mouth daily  Qty: 30 tablet, Refills: 5    Associated Diagnoses: Acute respiratory insufficiency      irbesartan (AVAPRO) 75 mg tablet 1/2 tab PO q day  Qty: 30 tablet, Refills: 1    Associated Diagnoses: Essential hypertension      metFORMIN (GLUCOPHAGE-XR) 750 mg 24 hr tablet Take 1 tablet (750 mg total) by mouth daily with dinner for 30 days  Qty: 90 tablet, Refills: 2    Comments: 750 mg to replace 500 mg  Associated Diagnoses: Type 2 diabetes mellitus without complication, without long-term current use of insulin (HCC)      metoprolol succinate (TOPROL-XL) 100 mg 24 hr tablet Take 0 5 tablets by mouth daily      Multiple Vitamin (MULTI-VITAMIN) tablet Take 1 tablet by mouth daily  Qty: 30 tablet, Refills: 1    Associated Diagnoses: Primary localized osteoarthritis of left knee      Multiple Vitamins-Minerals (ICAPS AREDS 2) CAPS Take 1 capsule by mouth daily      nitroglycerin (NITROSTAT) 0 3 mg SL tablet Nitrostat 0 3 mg sublingual tablet   Place 1 tablet as needed by sublingual route as needed for 90 days        omeprazole (PRILOSEC) 20 mg delayed release capsule Take 1 capsule by mouth Daily      oxyCODONE-acetaminophen (PERCOCET) 5-325 mg per tablet Take 1 tablet by mouth 2 (two) times a day as needed for moderate pain      potassium chloride (K-DUR,KLOR-CON) 20 mEq tablet Take 1 tablet (20 mEq total) by mouth daily  Qty: 30 tablet, Refills: 1    Associated Diagnoses: Acute respiratory insufficiency      pravastatin (PRAVACHOL) 40 mg tablet take 1 tablet by mouth once daily at bedtime  Qty: 30 tablet, Refills: 6    Associated Diagnoses: Dyslipidemia      spironolactone (ALDACTONE) 50 mg tablet Take 1 tablet (50 mg total) by mouth daily  Refills: 0    Associated Diagnoses: Acute respiratory insufficiency      docusate sodium (COLACE) 100 mg capsule Take 1 capsule (100 mg total) by mouth 2 (two) times a day  Qty: 10 capsule, Refills: 0    Associated Diagnoses: Colitis           No discharge procedures on file      ED Provider  Electronically Signed by           Cristiana Padilla MD  02/27/19 2487

## 2019-02-27 NOTE — TELEPHONE ENCOUNTER
Gray Elmore to wife  She is taking Jerene Serna to the ED now  He is having a hard time catching his breath even at rest  They will be going to a [address] ED today  No other needs at this time

## 2019-02-27 NOTE — ASSESSMENT & PLAN NOTE
Patient has significant leukocytosis the exact etiology of which is unclear  Patient denies any recent steroid exposure  The only source I can think of would be a left total knee replacement or the prosthetic aortic valve  Will check patient's ESR and C-reactive protein  Will check blood cultures x2  Will start patient on IV vancomycin, will ask Infectious Disease, Orthopedics and Cardiology the patient

## 2019-02-27 NOTE — SEPSIS NOTE
Sepsis Note   Herminia Face 68 y o  male MRN: [id number]  Unit/Bed#: ED 09 Encounter: [id number]      Initial Sepsis Screening     Row Name 02/27/19 1534                Is the patient's history suggestive of a new or worsening infection? Yes (Proceed)  (Abnormal)   -AT        Suspected source of infection  suspect infection, source unknown  -AT        Are two or more of the following signs & symptoms of infection both present and new to the patient? Yes (Proceed)  (Abnormal)   -AT        Indicate SIRS criteria  Tachycardia > 90 bpm;Hyperthemia > 38 3C (100 9F)  -AT        If the answer is yes to both questions, suspicion of sepsis is present          If severe sepsis is present AND tissue hypoperfusion perists in the hour after fluid resuscitation or lactate > 4, the patient meets criteria for SEPTIC SHOCK          Are any of the following organ dysfunction criteria present within 6 hours of suspected infection and SIRS criteria that are NOT considered to be chronic conditions? Yes  (Abnormal)   -AT        Organ dysfunction  Lactate > 2 0 mmol/L  -AT        Date of presentation of severe sepsis          Time of presentation of severe sepsis          Tissue hypoperfusion persists in the hour after crystalloid fluid administration, evidenced, by either:          Was hypotension present within one hour of the conclusion of crystalloid fluid administration?           Date of presentation of septic shock          Time of presentation of septic shock            User Key  (r) = Recorded By, (t) = Taken By, (c) = Cosigned By    Initials Name Provider Type    AT Nnamdi Mendez MD Physician

## 2019-02-27 NOTE — ASSESSMENT & PLAN NOTE
Patient's acute renal failure as well who prerenal from decreased p o  Intake, Lasix, tone and irbesartan  Will stop those medications, will hydrate her with normal saline solution as he has no evidence of acute diastolic CHF and will follow renal function with serial blood work  Patient require more than 2 midnights hospital stay, he is level 1 DNR      I discussed the case with patient's wife at the bedside in detail

## 2019-02-27 NOTE — ASSESSMENT & PLAN NOTE
Patient is lactic acidosis which could be due to acute kidney injury and taking metformin  Will stop metformin, will hydrate him with will folic acid levels    He does not appear to be septic on clinical examination

## 2019-02-27 NOTE — ASSESSMENT & PLAN NOTE
Patient has mild hyponatremia in the setting of hyperglycemia    Will treat this with IV fluids and blood sugar control

## 2019-02-27 NOTE — H&P
H&P- Leeroy Best 1945, 68 y o  male MRN: [id number]    Unit/Bed#: BLAKE Encounter: [id number]    Primary Care Provider: Arslan Erazo DO   Date and time admitted to hospital: 2/27/2019  2:06 PM        * EROS (acute kidney injury) [facility])  Assessment & Plan  Patient's acute renal failure as well who prerenal from decreased p o  Intake, Lasix, tone and irbesartan  Will stop those medications, will hydrate her with normal saline solution as he has no evidence of acute diastolic CHF and will follow renal function with serial blood work  Patient require more than 2 midnights hospital stay, he is level 1 DNR  I discussed the case with patient's wife at the bedside in detail    Dyspnea  Assessment & Plan  Patient complains of chest pressure and dyspnea when standing or walking relieved by laying down or sitting down  This is a positional type of dyspnea  CT scan the chest shows no evidence of pericardial effusion, pleural effusion, mediastinal mass, pneumonia  On physical examination he has no volume overload suggestive of acute CHF  EKG shows sinus rhythm with intraventricular block  The etiology of his position all chest pressure and dyspnea is elusive at present  Will ask Cardiology to comment on this  Leukocytosis  Assessment & Plan  Patient has significant leukocytosis the exact etiology of which is unclear  Patient denies any recent steroid exposure  The only source I can think of would be a left total knee replacement or the prosthetic aortic valve  Will check patient's ESR and C-reactive protein  Will check blood cultures x2  Will start patient on IV vancomycin, will ask Infectious Disease, Orthopedics and Cardiology the patient  Type 2 diabetes mellitus without complication, without long-term current use of insulin (HCC)  Assessment & Plan  Lab Results   Component Value Date    HGBA1C 7 0 (H) 01/08/2019       No results for input(s): POCGLU in the last 72 hours      Blood Sugar Average: Last 72 hrs:       Patient's blood sugars were well controlled at home but in the ER he has uncontrolled hyperglycemia with blood sugar 231  Will hold patient's metformin as patient has acute kidney injury and lactic acidosis  I placed him on Lantus and pre meal Humalog 6    Chronic diastolic congestive heart failure [facility])  Assessment & Plan  Patient has no evidence of volume overload, his chronic diastolic CHF is at baseline  Will monitor for volume overload on IV fluids    History of aortic valve replacement with bioprosthetic valve  Assessment & Plan  Echocardiogram 2 weeks ago showed no valvular vegetations  Hyponatremia  Assessment & Plan  Patient has mild hyponatremia in the setting of hyperglycemia  Will treat this with IV fluids and blood sugar control    Paroxysmal atrial fibrillation [facility])  Assessment & Plan  Patient currently is in normal sinus rhythm will monitor on telemetry    S/P total knee arthroplasty, left  Assessment & Plan  Will ask Ortho to evaluate the patient for possible infection the prosthesis  Clinically I doubt that            VTE Prophylaxis: ordered    Code Status: as above    Anticipated Length of Stay: as above    Justification for Hospital Stay: see assessment and plan        Chief Complaint:   Chest pressure and shortness of breath    History of Present Illness:    Cy Dhillon is a 68 y o  male who presents with above chief compaint  Patient had aortic valve replacement in 2014  He had a left knee replacement about a month ago and about 2 3 weeks ago he was admitted to the hospital for acute diastolic CHF  At that time he came in with a chief complaint of shortness of breath  Patient claims that ever since he was discharged from the hospital he has been feeling short of breath:  When he stands up he feels short of breath he starts walking he feels short of breath  When he sits down or lays down the shortness of breath is resolved    Shortness of breath associated with it chest pressure feeling that he does not experience the pain  The chest pressure resolves when he sits down or lays down and and last until he maintains his upright posture  The shortness of breath and chest pressure feeling were getting worse over last 2-3 days that is why he presented to the ER for evaluation  EKG shows intraventricular block, troponin is normal     CT scan of the chest shows no mediastinal widening, focal infiltrates, effusion, pericardial effusion  Echocardiogram from 2 weeks ago shows normal LV systolic function and a normal functioning bioprosthetic aortic valve replacement  Patient has type 2 diabetes with blood sugars in the 100 teens usually but today's was more than 200  Patient's baseline creatinine is 0 95 from 2 weeks ago and his leukocyte count was 8 49  He denies any fevers, chills, night sweats, weight gain in fact he thinks he lost some weight compared to 2 weeks ago  He denies any abdominal pain, nausea, diarrhea, constipation, signs of GI bleeding  He denies any difficulty urinating, dysuria frequency urgency  Review of Systems:    Review of Systems   Constitutional: Negative for chills, fatigue and fever  HENT: Negative for congestion  Eyes: Negative for visual disturbance  Respiratory: Positive for shortness of breath  Negative for cough  Cardiovascular: Positive for chest pain  Negative for palpitations and leg swelling  Gastrointestinal: Negative for abdominal pain, blood in stool, constipation, diarrhea and nausea  Endocrine: Negative for polydipsia and polyphagia  Genitourinary: Negative for difficulty urinating, dysuria, frequency and hematuria  Musculoskeletal: Positive for joint swelling  Negative for myalgias and neck stiffness  Skin: Negative for rash  Neurological: Negative for weakness, numbness and headaches  Hematological: Negative for adenopathy     Psychiatric/Behavioral: Negative for dysphoric mood  All other systems reviewed and are negative  Past Medical and Surgical History:     Past Medical History:   Diagnosis Date    [facility]     Cancer [facility]     Colitis     Fatty liver     History of chemotherapy     History of radiation therapy     History of shingles 2018    Pneumonia        Past Surgical History:   Procedure Laterality Date    AORTIC VALVE REPLACEMENT      COLECTOMY      COLONOSCOPY      DENTAL SURGERY      KNEE SURGERY      LYMPHADENECTOMY      OTHER SURGICAL HISTORY      MAZE PROCEDURE    [place] TOTAL KNEE ARTHROPLASTY Left 2019    Procedure: ARTHROPLASTY LEFT KNEE TOTAL;  Surgeon: Shama Villalba MD;  Location: [facility];  Service: Orthopedics       Meds/Allergies:    Prior to Admission Medications   Prescriptions Last Dose Informant Patient Reported? Taking?    Multiple Vitamin (MULTI-VITAMIN) tablet  Self No No   Sig: Take 1 tablet by mouth daily   ascorbic acid (VITAMIN C) 500 mg tablet  Self No No   Sig: Take 1 tablet (500 mg total) by mouth 2 (two) times a day   aspirin (HM ASPIRIN) 325 mg tablet  Self No No   Sig: Take 1 tablet (325 mg total) by mouth 2 (two) times a day   docusate sodium (COLACE) 100 mg capsule   No No   Sig: Take 1 capsule (100 mg total) by mouth 2 (two) times a day   ferrous sulfate 324 (65 Fe) mg  Self No No   Sig: Take 1 tablet (324 mg total) by mouth 2 (two) times a day before meals   folic acid (FOLVITE) 1 mg tablet  Self No No   Sig: Take 1 tablet (1 mg total) by mouth daily   furosemide (LASIX) 20 mg tablet   No No   Sig: Take 2 tablets (40 mg total) by mouth daily   irbesartan (AVAPRO) 75 mg tablet   No No   Si/2 tab PO q day   metFORMIN (GLUCOPHAGE-XR) 750 mg 24 hr tablet   No No   Sig: Take 1 tablet (750 mg total) by mouth daily with dinner for 30 days   metoprolol succinate (TOPROL-XL) 100 mg 24 hr tablet  Self Yes No   Sig: Take 0 5 tablets by mouth daily   nitroglycerin (NITROSTAT) 0 3 mg SL tablet Self Yes No   Sig: Nitrostat 0 3 mg sublingual tablet   Place 1 tablet as needed by sublingual route as needed for 90 days  omeprazole (PRILOSEC) 20 mg delayed release capsule  Self Yes No   Sig: Take 1 capsule by mouth Daily   potassium chloride (K-DUR,KLOR-CON) 20 mEq tablet   No No   Sig: Take 1 tablet (20 mEq total) by mouth daily   pravastatin (PRAVACHOL) 40 mg tablet  Self No No   Sig: take 1 tablet by mouth once daily at bedtime   spironolactone (ALDACTONE) 50 mg tablet   No No   Sig: Take 1 tablet (50 mg total) by mouth daily      Facility-Administered Medications: None       Allergies: Allergies   Allergen Reactions    Ceftin [Cefuroxime] Itching       Social History:     Social History     Substance and Sexual Activity   Alcohol Use Yes    Comment: 5-6 beers a week     Social History     Tobacco Use   Smoking Status Former Smoker    Packs/day: 1 00    Years: 40 00    Pack years: 40 00    Types: Cigarettes    Last attempt to quit: 2009    Years since quitting: 10 1   Smokeless Tobacco Never Used     Social History     Substance and Sexual Activity   Drug Use No       Family History:    Family History   Problem Relation Age of Onset    Heart block Family     Coronary artery disease Mother     Thrombosis Mother     Diabetes Father     Hypertension Father     Colon cancer Paternal Grandfather        Physical Exam:     Vitals:   Blood Pressure: 112/59 (02/27/19 1630)  Pulse: 87 (02/27/19 1630)  Temperature: 98 7 °F (37 1 °C) (02/27/19 1445)  Temp Source: Temporal (02/27/19 1445)  Respirations: 20 (02/27/19 1630)  SpO2: 99 % (02/27/19 1630)    Physical Exam   Constitutional: He is oriented to person, place, and time  He appears well-developed  No distress  HENT:   Head: Normocephalic  Mouth/Throat: Oropharynx is clear and moist    Eyes: Conjunctivae are normal    Neck: Neck supple  No JVD present  Cardiovascular: Normal rate and regular rhythm     Murmur ( faint systolic murmur is heard) heard   Pulmonary/Chest: Effort normal  No respiratory distress  He has no wheezes  He has no rales  Abdominal: Soft  Bowel sounds are normal  He exhibits no distension  There is no tenderness  Musculoskeletal: He exhibits tenderness (Left total knee replacement is might tender, warm but there is no erythema there is no effusion)  Lymphadenopathy:     He has no cervical adenopathy  Neurological: He is alert and oriented to person, place, and time  No cranial nerve deficit  Skin: Skin is warm and dry  No rash noted  Patient has no evidence of abscess, ulcers cellulitis or rash on his skin   Psychiatric: He has a normal mood and affect  Vitals reviewed  Additional Data:     Lab Results: I personally reviewed them    Results from last 7 days   Lab Units 02/27/19  1424   WBC Thousand/uL 23 48*   HEMOGLOBIN g/dL 12 9   HEMATOCRIT % 39 9   PLATELETS Thousands/uL 484*   NEUTROS PCT % 85*   LYMPHS PCT % 7*   MONOS PCT % 7   EOS PCT % 0     Results from last 7 days   Lab Units 02/27/19  1424   POTASSIUM mmol/L 4 5   CHLORIDE mmol/L 89*   CO2 mmol/L 31   BUN mg/dL 42*   CREATININE mg/dL 1 74*   CALCIUM mg/dL 9 6   ALK PHOS U/L 96   ALT U/L 21   AST U/L 16     Results from last 7 days   Lab Units 02/27/19  1454   INR  1 18*       Imaging: I personally reviewed them    CT chest abdomen pelvis wo contrast   Final Result by Tere Lisa MD (02/27 1618)      Proctocolitis  No noncontrast CT abnormality to account for the patient's chest pain  Mild nonspecific perinephric stranding  No obstructive uropathy  Normal distention of the urinary bladder noted                 Workstation performed: [id number]         X-ray chest 2 views   Final Result by Meet Keller MD (02/27 4707)      No evidence of acute abnormality in the chest             Workstation performed: [id number]             EKG : I personally reviewed      Roney Elizabeth MD    ** Please Note: This note has been constructed using a voice recognition system   **

## 2019-02-27 NOTE — ASSESSMENT & PLAN NOTE
Will ask Ortho to evaluate the patient for possible infection the prosthesis    Clinically I doubt that

## 2019-02-27 NOTE — ASSESSMENT & PLAN NOTE
Patient has no evidence of volume overload, his chronic diastolic CHF is at baseline    Will monitor for volume overload on IV fluids

## 2019-02-27 NOTE — TELEPHONE ENCOUNTER
Pt is not getting better, was treated for congestive heart failure   He is always having trouble catching his breath, please give pts wife a call

## 2019-02-27 NOTE — ASSESSMENT & PLAN NOTE
Lab Results   Component Value Date    HGBA1C 7 0 (H) 01/08/2019       No results for input(s): POCGLU in the last 72 hours  Blood Sugar Average: Last 72 hrs:       Patient's blood sugars were well controlled at home but in the ER he has uncontrolled hyperglycemia with blood sugar 231  Will hold patient's metformin as patient has acute kidney injury and lactic acidosis    I placed him on Lantus and pre meal Humalog 6

## 2019-02-28 LAB
ALBUMIN SERPL BCP-MCNC: 3.2 G/DL (ref 3.5–5)
ALP SERPL-CCNC: 83 U/L (ref 46–116)
ALT SERPL W P-5'-P-CCNC: 16 U/L (ref 12–78)
ANION GAP SERPL CALCULATED.3IONS-SCNC: 8 MMOL/L (ref 4–13)
AST SERPL W P-5'-P-CCNC: 12 U/L (ref 5–45)
BILIRUB SERPL-MCNC: 0.4 MG/DL (ref 0.2–1)
BUN SERPL-MCNC: 32 MG/DL (ref 5–25)
CALCIUM SERPL-MCNC: 8.7 MG/DL (ref 8.3–10.1)
CHLORIDE SERPL-SCNC: 98 MMOL/L (ref 100–108)
CO2 SERPL-SCNC: 32 MMOL/L (ref 21–32)
CREAT SERPL-MCNC: 1.38 MG/DL (ref 0.6–1.3)
ERYTHROCYTE [DISTWIDTH] IN BLOOD BY AUTOMATED COUNT: 14.9 % (ref 11.6–15.1)
GFR SERPL CREATININE-BSD FRML MDRD: 51 ML/MIN/1.73SQ M
GLUCOSE SERPL-MCNC: 114 MG/DL (ref 65–140)
GLUCOSE SERPL-MCNC: 121 MG/DL (ref 65–140)
GLUCOSE SERPL-MCNC: 133 MG/DL (ref 65–140)
GLUCOSE SERPL-MCNC: 145 MG/DL (ref 65–140)
GLUCOSE SERPL-MCNC: 163 MG/DL (ref 65–140)
HCT VFR BLD AUTO: 31 % (ref 36.5–49.3)
HGB BLD-MCNC: 9.9 G/DL (ref 12–17)
MCH RBC QN AUTO: 27.2 PG (ref 26.8–34.3)
MCHC RBC AUTO-ENTMCNC: 31.9 G/DL (ref 31.4–37.4)
MCV RBC AUTO: 85 FL (ref 82–98)
PLATELET # BLD AUTO: 317 THOUSANDS/UL (ref 149–390)
PMV BLD AUTO: 10.2 FL (ref 8.9–12.7)
POTASSIUM SERPL-SCNC: 3.7 MMOL/L (ref 3.5–5.3)
PROT SERPL-MCNC: 6 G/DL (ref 6.4–8.2)
RBC # BLD AUTO: 3.64 MILLION/UL (ref 3.88–5.62)
SODIUM SERPL-SCNC: 138 MMOL/L (ref 136–145)
WBC # BLD AUTO: 14.26 THOUSAND/UL (ref 4.31–10.16)

## 2019-02-28 PROCEDURE — G8980 MOBILITY D/C STATUS: HCPCS

## 2019-02-28 PROCEDURE — G8988 SELF CARE GOAL STATUS: HCPCS

## 2019-02-28 PROCEDURE — G8979 MOBILITY GOAL STATUS: HCPCS

## 2019-02-28 PROCEDURE — 99024 POSTOP FOLLOW-UP VISIT: CPT | Performed by: PHYSICIAN ASSISTANT

## 2019-02-28 PROCEDURE — 82948 REAGENT STRIP/BLOOD GLUCOSE: CPT

## 2019-02-28 PROCEDURE — 85027 COMPLETE CBC AUTOMATED: CPT | Performed by: INTERNAL MEDICINE

## 2019-02-28 PROCEDURE — 97166 OT EVAL MOD COMPLEX 45 MIN: CPT

## 2019-02-28 PROCEDURE — 99222 1ST HOSP IP/OBS MODERATE 55: CPT | Performed by: INTERNAL MEDICINE

## 2019-02-28 PROCEDURE — 97162 PT EVAL MOD COMPLEX 30 MIN: CPT

## 2019-02-28 PROCEDURE — 80053 COMPREHEN METABOLIC PANEL: CPT | Performed by: INTERNAL MEDICINE

## 2019-02-28 PROCEDURE — G8978 MOBILITY CURRENT STATUS: HCPCS

## 2019-02-28 PROCEDURE — G8987 SELF CARE CURRENT STATUS: HCPCS

## 2019-02-28 PROCEDURE — 99233 SBSQ HOSP IP/OBS HIGH 50: CPT | Performed by: INTERNAL MEDICINE

## 2019-02-28 RX ADMIN — HEPARIN SODIUM 5000 UNITS: 5000 INJECTION INTRAVENOUS; SUBCUTANEOUS at 05:36

## 2019-02-28 RX ADMIN — INSULIN LISPRO 1 UNITS: 100 INJECTION, SOLUTION INTRAVENOUS; SUBCUTANEOUS at 16:34

## 2019-02-28 RX ADMIN — INSULIN GLARGINE 8 UNITS: 100 INJECTION, SOLUTION SUBCUTANEOUS at 21:16

## 2019-02-28 RX ADMIN — VANCOMYCIN HYDROCHLORIDE 1500 MG: 1 INJECTION, POWDER, LYOPHILIZED, FOR SOLUTION INTRAVENOUS at 18:32

## 2019-02-28 RX ADMIN — SODIUM CHLORIDE 125 ML/HR: 0.9 INJECTION, SOLUTION INTRAVENOUS at 03:46

## 2019-02-28 RX ADMIN — INSULIN LISPRO 3 UNITS: 100 INJECTION, SOLUTION INTRAVENOUS; SUBCUTANEOUS at 16:34

## 2019-02-28 RX ADMIN — ASPIRIN 325 MG ORAL TABLET 325 MG: 325 PILL ORAL at 08:58

## 2019-02-28 RX ADMIN — PRAVASTATIN SODIUM 40 MG: 40 TABLET ORAL at 21:16

## 2019-02-28 RX ADMIN — INSULIN LISPRO 3 UNITS: 100 INJECTION, SOLUTION INTRAVENOUS; SUBCUTANEOUS at 09:00

## 2019-02-28 RX ADMIN — HEPARIN SODIUM 5000 UNITS: 5000 INJECTION INTRAVENOUS; SUBCUTANEOUS at 21:17

## 2019-02-28 RX ADMIN — HEPARIN SODIUM 5000 UNITS: 5000 INJECTION INTRAVENOUS; SUBCUTANEOUS at 13:19

## 2019-02-28 RX ADMIN — SODIUM CHLORIDE 75 ML/HR: 0.9 INJECTION, SOLUTION INTRAVENOUS at 13:21
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
## 2019-02-28 NOTE — UTILIZATION REVIEW
Network Utilization Review Department  Phone: [phone]; Fax [phone]  [email or web address]  ATTENTION: Please call with any questions or concerns to [phone]  and carefully listen to the prompts so that you are directed to the right person  Send all requests for admission clinical reviews, approved or denied determinations and any other requests to fax [phone]  All voicemails are confidential   Initial Clinical Review    Admission: Date/Time/Statement: INPT 2/27/19 @ 1629   Orders Placed This Encounter   Procedures    Inpatient Admission     Standing Status:   Standing     Number of Occurrences:   1     Order Specific Question:   Admitting Physician     Answer:   Lindy Macias     Order Specific Question:   Level of Care     Answer:   Med Surg [16]     Order Specific Question:   Estimated length of stay     Answer:   More than 2 Midnights     Order Specific Question:   Certification     Answer:   I certify that inpatient services are medically necessary for this patient for a duration of greater than two midnights  See H&P and MD Progress Notes for additional information about the patient's course of treatment  ED: Date/Time/Mode of Arrival:   ED Arrival Information     Expected Arrival Acuity Means of Arrival Escorted By Service Admission Type    - 2/27/2019 14:02 Emergent Walk-In Family Member General Medicine Emergency    Arrival Complaint    SOB, CHF,        Chief Complaint:   Chief Complaint   Patient presents with    Shortness of Breath     Patient states it is hard to take a deep breathe     History of Illness:  69 yo male had aortic valve replacement in 2014  He had a left knee replacement about a month ago and about 2 3 weeks ago he was admitted to the hospital for acute diastolic CHF    At that time he came in with a chief complaint of shortness of breath        Patient claims that ever since he was discharged from the hospital he has been feeling short of breath:  When he stands up he feels short of breath he starts walking he feels short of breath  When he sits down or lays down the shortness of breath is resolved  Shortness of breath associated with it chest pressure feeling that he does not experience the pain  The chest pressure resolves when he sits down or lays down and and last until he maintains his upright posture      The shortness of breath and chest pressure feeling were getting worse over last 2-3 days that is why he presented to the ER for evaluation      EKG shows intraventricular block, troponin is normal      CT scan of the chest shows no mediastinal widening, focal infiltrates, effusion, pericardial effusion      Echocardiogram from 2 weeks ago shows normal LV systolic function and a normal functioning bioprosthetic aortic valve replacement      Patient has type 2 diabetes with blood sugars in the 100 teens usually but today's was more than 200      Patient's baseline creatinine is 0 95 from 2 weeks ago and his leukocyte count was 8  49      He denies any fevers, chills, night sweats, weight gain in fact he thinks he lost some weight compared to 2 weeks ago        ED Vital Signs:   ED Triage Vitals   Temperature Pulse Respirations Blood Pressure SpO2   02/27/19 1445 02/27/19 1415 02/27/19 1415 02/27/19 1415 02/27/19 1410   98 7 °F (37 1 °C) 105 22 122/58 97 %      Temp Source Heart Rate Source Patient Position - Orthostatic VS BP Location FiO2 (%)   02/27/19 1445 02/27/19 1415 02/27/19 1415 02/27/19 1415 --   Temporal Monitor Lying Right arm       Pain Score       02/27/19 1433       6        Wt Readings from Last 1 Encounters:   02/28/19 73 8 kg (162 lb 11 2 oz)     Vital Signs (abnormal):   02/27/19 2300  98 2 °F (36 8 °C)  83  18  106/55  76  96 %  None (Room air)  Lying   02/27/19 1723  98 1 °F (36 7 °C)  90  20  120/58    99 %  None (Room air)  Lying   02/27/19 1630    87  20  112/59    99 %  None (Room air)  Lying   02/27/19 1615   90  22  117/58    98 %  None (Room air)  Lying   02/27/19 1530    96  19  107/56    98 %  None (Room air)  Lying   02/27/19 1445  98 7 °F (37 1 °C)  100  21  116/57    98 %       02/27/19 1430              None (Room air)     02/27/19 1415    105  22  122/58    100 %  None (Room air)  Lying   02/27/19 1410            97 %           Pertinent Labs/Diagnostic Test Results:   Lab Units 02/27/19  1424   WBC Thousand/uL 23 48*   HEMOGLOBIN g/dL 12 9   HEMATOCRIT % 39 9   PLATELETS Thousands/uL 484*   NEUTROS PCT % 85*   LYMPHS PCT % 7*   MONOS PCT % 7   EOS PCT % 0           Results from last 7 days   Lab Units 02/27/19  1424   POTASSIUM mmol/L 4 5   CHLORIDE mmol/L 89*   CO2 mmol/L 31   BUN mg/dL 42*   CREATININE mg/dL 1 74*   CALCIUM mg/dL 9 6   ALK PHOS U/L 96   ALT U/L 21   AST U/L 16           Results from last 7 days   Lab Units 02/27/19  1454   INR   1 18*      Pt 14 3    c reactive pro 20 9  procalcitonin 0 48    CT chest:  Proctocolitis        No noncontrast CT abnormality to account for the patient's chest pain        Mild nonspecific perinephric stranding  No obstructive uropathy  Normal distention of the urinary bladder noted         CXR:  No evidence of acute abnormality in the chest            EKG:Sinus tachycardia  Indeterminate axis  Non-specific intra-ventricular conduction delay  Borderline ECG  When compared with ECG of 14-FEB-2019 05:52,  No significant change was found      ED Treatment:   Medication Administration from 02/27/2019 1401 to 02/27/2019 1715       Date/Time Order Dose Route Action Action by Comments     02/27/2019 1701 sodium chloride 0 9 % bolus 500 mL 0 mL Intravenous Stopped Felipe Sweeney RN      02/27/2019 1543 sodium chloride 0 9 % bolus 500 mL 500 mL Intravenous [address] Felipe Sweeney RN      02/27/2019 1701 sodium chloride 0 9 % bolus 500 mL 0 mL Intravenous Stopped Felipe Sweeney RN      02/27/2019 1544 sodium chloride 0 9 % bolus 500 mL 500 mL Intravenous [address] Albertina Vazquez RN      02/27/2019 1701 piperacillin-tazobactam (ZOSYN) 3 375 g in sodium chloride 0 9 % 50 mL IVPB 0 g Intravenous Stopped Albertina Vazquez RN      02/27/2019 1604 piperacillin-tazobactam (ZOSYN) 3 375 g in sodium chloride 0 9 % 50 mL IVPB 3 375 g Intravenous [place] Moni Fernandez RN         Past Medical/Surgical History:    Active Ambulatory Problems     Diagnosis Date Noted    Basal cell carcinoma of skin 01/23/2018    Coronary disease 10/07/2014    Type 2 diabetes mellitus without complication, without long-term current use of insulin ([facility] [address] ) 06/21/2012    Dyslipidemia 05/17/2012    Elevation of level of transaminase or lactic acid dehydrogenase (LDH) 09/14/2012    Erectile dysfunction 11/03/2017    Esophageal reflux 09/14/2012    Fatty liver 06/21/2012    Malignant lymphoma ([facility] [address] ) 06/21/2012    Malignant tumor of cecum ([facility] [address] ) 09/26/2013    Multiple myeloma not having achieved remission ([facility] [address] ) 05/25/2018    Primary localized osteoarthritis of right knee 08/17/2018    Paroxysmal atrial fibrillation (HCC)     Hx of CABG     EROS (acute kidney injury) ([facility] [address] ) 11/25/2018    Colitis 11/25/2018    Essential hypertension 11/25/2018    S/P total knee arthroplasty, left 01/28/2019    Chronic diastolic congestive heart failure ([address] ) 02/14/2019    History of aortic valve replacement with bioprosthetic valve 02/14/2019     Resolved Ambulatory Problems     Diagnosis Date Noted    Aortic stenosis 06/21/2012    Nicotine dependence 05/25/2018    Primary localized osteoarthritis of left knee 08/17/2018    Moderate protein-calorie malnutrition ([address] ) 11/27/2018    Postoperative anemia due to acute blood loss 01/29/2019    Aftercare following left knee joint replacement surgery 02/11/2019    Acute respiratory failure with hypoxia ([address] ) 02/14/2019    Bilateral pleural effusion 02/14/2019     Past Medical History:   Diagnosis Date    Atrial fibrillation ([address] )     Cancer ([facility] [address] )     Colitis     Fatty liver     History of chemotherapy     History of radiation therapy     History of shingles 2018    Pneumonia      Admitting Diagnosis: Lactic acidosis [E87 2]  CHF (congestive heart failure) (HCC) [I50 9]  Leukocytosis [D72 829]  Chest pain [R07 9]  SOB (shortness of breath) [R06 02]  SIRS (systemic inflammatory response syndrome) (HCC) [R65 10]  S/P total knee arthroplasty, left [Z96 652]  Age/Sex: 68 y o  male  Assessment/Plan: 69 yo male with EROS with decreased p o  Intake, dyspnea, chest pressure, stopping lasix and irbesartan  [email or web address]  CT chest- shows no evidence of pericardial effusion, pleural effusion, mediastinal mass, pneumonia  EKG shows sinus rhythm with intraventricular block  Cardiology consult  Leukocytosis-ESR and C-reactive protein, bld cx, iv vanco, ID consult        Admission Orders:  INPT  TELE  DAILY WEIGHTS  OOB  PT/OT  CONSUL TID  CONSUTL CARDIOLOGY  CONSULT ORTHO SURGERY  CONS CARB DIET  SEQ COMP DEVICE    Scheduled Meds:   Current Facility-Administered Medications:  acetaminophen 650 mg Oral Q6H PRN Robina Gomez MD    aspirin 325 mg Oral Daily Robina Gomez MD    heparin (porcine) 5,000 Units Subcutaneous Q8H [address] Robina Gomez MD    insulin glargine 8 Units Subcutaneous HS Robina Gomez MD    insulin lispro 1-5 Units Subcutaneous TID AC Robina Gomez MD    insulin lispro 3 Units Subcutaneous TID With Meals Robina Gomez MD    ondansetron 4 mg Intravenous Q8H PRN Robina Gomez MD    pravastatin 40 mg Oral HS Robina Gomez MD    sodium chloride 125 mL/hr Intravenous Continuous Robina Gomez MD Last Rate: 125 mL/hr (02/28/19 0346)   vancomycin 20 mg/kg Intravenous Q24H Robina Gomez MD Last Rate: 1,500 mg (02/27/19 9916)     Continuous Infusions:   sodium chloride 125 mL/hr Last Rate: 125 mL/hr (02/28/19 0346)     PRN Meds:   acetaminophen    ondansetron

## 2019-02-28 NOTE — CONSULTS
Consultation - [address] [place], [place] [address] 68 y o  male MRN: [id number]  Unit/Bed#: [address] [place] [address] [id number] Encounter: [id number]      Assessment/Plan     Assessment:  S/p left TKA    Plan:  PT/OT for left knee  WBAT to LLE with assistance  Doing well in regards to left knee  Pain control as needed  Follow up in office with Dr Rebecca Tineo as scheduled    History of Present Illness   Physician Requesting Consult: Robina Gomez MD  Reason for Consult / Principal Problem: s/p left TKA  HPI: Allyson Vidales is a 68y o  year old male who presents with to ED with SOB  He recently had a left TKA by Dr Rebecca Tineo on January 28, 2019  He has been doing well at home and receiving physical therapy  The pain in his left knee is controlled  He denies any recent fevers or chills  No recent increased swelling or pain  He was taking aspirin 325 mg b i d  For DVT prophylaxis at home  He was admitted to the medical service for shortness of breath and Orthopedics was consulted  Inpatient consult to Orthopedic Surgery  Consult performed by: Helen Mcgregor PA-C  Consult ordered by: Robina Gomez MD          Review of Systems   Constitutional: Negative  HENT: Negative  Eyes: Negative  Respiratory: Positive for shortness of breath  Cardiovascular: Negative  Gastrointestinal: Negative  Endocrine: Negative  Genitourinary: Negative  Musculoskeletal: Positive for arthralgias and joint swelling  Skin: Negative  Allergic/Immunologic: Negative  Neurological: Negative  Hematological: Negative  Psychiatric/Behavioral: Negative          Historical Information   Past Medical History:   Diagnosis Date    Atrial fibrillation ([facility] [address] )     Cancer ([facility] [address] )     Colitis     Fatty liver     History of chemotherapy     History of radiation therapy     History of shingles 2018    Pneumonia      Past Surgical History:   Procedure Laterality Date    AORTIC VALVE REPLACEMENT      COLECTOMY      COLONOSCOPY      DENTAL SURGERY  KNEE SURGERY      LYMPHADENECTOMY      OTHER SURGICAL HISTORY      MAZE PROCEDURE    TN TOTAL KNEE ARTHROPLASTY Left 1/28/2019    Procedure: ARTHROPLASTY LEFT KNEE TOTAL;  Surgeon: Radha Landaverde MD;  Location:  MAIN OR;  Service: Orthopedics     Social History   Social History     Substance and Sexual Activity   Alcohol Use Yes    Frequency: Monthly or less    Drinks per session: 1 or 2    Binge frequency: Never    Comment: has not drank in over a month     Social History     Substance and Sexual Activity   Drug Use No     Social History     Tobacco Use   Smoking Status Former Smoker    Packs/day: 1 00    Years: 40 00    Pack years: 40 00    Types: Cigarettes    Last attempt to quit: 2009    Years since quitting: 10 1   Smokeless Tobacco Never Used     Family History: non-contributory    Meds/Allergies   all current active meds have been reviewed  Allergies   Allergen Reactions    Ceftin [Cefuroxime] Itching       Objective   Vitals: Blood pressure 107/59, pulse 83, temperature 97 7 °F (36 5 °C), temperature source Oral, resp  rate 18, height 5' 11" (1 803 m), weight 73 8 kg (162 lb 11 2 oz), SpO2 98 %  ,Body mass index is 22 69 kg/m²  Intake/Output Summary (Last 24 hours) at 2/28/2019 0953  Last data filed at 2/28/2019 0904  Gross per 24 hour   Intake 1490 ml   Output 1700 ml   Net -210 ml     I/O last 24 hours: In: 2684 [P O :240; IV Piggyback:1250]  Out: 1700 [Urine:1700]    Invasive Devices     Peripheral Intravenous Line            Peripheral IV 02/27/19 Left Antecubital less than 1 day                Physical Exam   Constitutional: He is oriented to person, place, and time  He appears well-developed and well-nourished  No distress  HENT:   Head: Normocephalic and atraumatic  Mouth/Throat: Oropharynx is clear and moist    Eyes: Pupils are equal, round, and reactive to light  Conjunctivae and EOM are normal    Neck: Normal range of motion  Neck supple     Cardiovascular: Normal rate and regular rhythm  Pulmonary/Chest: Effort normal and breath sounds normal  No respiratory distress  Abdominal: Soft  Bowel sounds are normal  There is no tenderness  Musculoskeletal:        Left knee: He exhibits effusion (Trace)  Neurological: He is alert and oriented to person, place, and time  Skin: Skin is warm and dry  Psychiatric: He has a normal mood and affect  Left Knee Exam     Tenderness   The patient is experiencing no tenderness  Range of Motion   Extension: -5   Flexion: 100     Tests   Varus: negative Valgus: negative    Other   Erythema: absent  Sensation: normal  Pulse: present  Swelling: moderate  Effusion: effusion (Trace) present    Comments:  Healing incision with no evidence of infection  No erythema of knee  Calf soft, nontender  No increased pain with active range of motion  No increased warmth            Lab Results: I have personally reviewed pertinent lab results

## 2019-02-28 NOTE — SOCIAL WORK
Met with Pt  Pt presents AA&Ox3  Discussed role of   Pt lives with wife in [address] [facility], has 1st floor setup  Pt is independent with adls  Pt uses cane  Pt's wife and goes grocery shopping  Pt goes to [facility] on [address]  Pt has SN through [facility]  Pt agreeable for SN through [facility]  Referral sent to [facility] via [facility]  Will follow

## 2019-02-28 NOTE — PROGRESS NOTES
Progress Note - Katina Cuellar 1945, 68 y o  male MRN: [id number]    Unit/Bed#: [address] [place] [address] Encounter: [id number]    Primary Care Provider: Damaris Castellon DO   Date and time admitted to hospital: 2/27/2019  2:06 PM        * EROS (acute kidney injury) [facility])  Assessment & Plan  Patient's acute renal failure as well who prerenal from decreased p o  Intake, Lasix  and irbesartan  Will continue to hold the above medication, will continue normal saline solution at 75 cc an hour, will monitor renal function with serial labs    Dyspnea  Assessment & Plan  Patient complains of chest pressure and dyspnea when standing or walking relieved by laying down or sitting down  I still do not see any signs of volume overload this morning  I encouraged patient to get of bed and walk around to see if this shortness of breath is still present when upright  Will await cardiology's recommendations or this could be associated with the aortic valve replacement    Leukocytosis  Assessment & Plan  Patient has significant leukocytosis the exact etiology of which is unclear  Patient has been afebrile, leukocytosis decreased to 14,000 this morning  ESR is 32, procalcitonin is 0 4, C-reactive protein is 20  Blood culture showing no growth  Will continue IV vancomycin for now  I discussed the case with Dr Ibrahima Mccarthy who feels that the left knee prosthesis is not infected , will await ID's evaluation    Type 2 diabetes mellitus without complication, without long-term current use of insulin [facility])  Assessment & Plan  Lab Results   Component Value Date    HGBA1C 5 7 02/27/2019       Recent Labs     02/27/19  1811 02/27/19  2047 02/28/19  0722   POCGLU 131 159* 133       Blood Sugar Average: Last 72 hrs:  (P) 141       Blood sugars are well controlled at home, will hold the metformin icing of acute kidney injury lactic acidosis    The will continue Lantus and Humalog today    Chronic diastolic congestive heart failure [facility])  Assessment & Plan  Patient has no evidence of volume overload, his chronic diastolic CHF is at baseline  Will monitor for volume overload on IV fluids    Hyponatremia  Assessment & Plan  Patient has mild hyponatremia on admission in the setting of hyperglycemia  Hyponatremia resolved this morning with better blood sugar control and normal saline solution    Paroxysmal atrial fibrillation [facility])  Assessment & Plan  Patient currently is in normal sinus rhythm will DC telemetry now    Lactic acidosis  Assessment & Plan  lactic acidosis was present admission which was due to metformin most likely an acute kidney injury  Lactic acidosis resolved  Will continue holding metformin        VTE Prophylaxis: in place    Patient Centered Rounds: I rounded with patient's nurse    Current Length of Stay: 1 day(s)    Current Patient Status: Inpatient    Certification Statement: Pt requires additional inpatient hospital stay due to: see assessment and plan        Subjective: The the patient had a good night sleep this morning he denies any headache, chest pain, shortness of breath, cough, abdominal pain, nausea, diarrhea, difficulty urinating, dysuria, increased knee pain, new rash  Patient's nurse reports that patient has been stable    All other ROS are negative    Objective:     Vitals:   Temp (24hrs), Av 2 °F (36 8 °C), Min:97 7 °F (36 5 °C), Max:98 7 °F (37 1 °C)    Temp:  [97 7 °F (36 5 °C)-98 7 °F (37 1 °C)] 97 7 °F (36 5 °C)  HR:  [] 83  Resp:  [18-22] 18  BP: (106-122)/(55-59) 107/59  SpO2:  [96 %-100 %] 98 %  Body mass index is 22 69 kg/m²  Input and Output Summary (last 24 hours): Intake/Output Summary (Last 24 hours) at 2019 0944  Last data filed at 2019 5692  Gross per 24 hour   Intake 1490 ml   Output 1700 ml   Net -210 ml       Physical Exam:     Physical Exam   Constitutional: He is oriented to person, place, and time  He appears well-developed  No distress     HENT: Head: Normocephalic  Mouth/Throat: Oropharynx is clear and moist    Eyes: Conjunctivae are normal    Neck: Neck supple  Cardiovascular: Normal rate and regular rhythm  Pulmonary/Chest: Effort normal  No respiratory distress  He has no wheezes  He has no rales  Abdominal: Soft  Bowel sounds are normal  He exhibits no distension  There is no tenderness  Musculoskeletal: He exhibits no tenderness  Patient has mild warmth of the left knee but there is no erythema there is no cellulitis there is no effusion   Lymphadenopathy:     He has no cervical adenopathy  Neurological: He is alert and oriented to person, place, and time  No cranial nerve deficit  Skin: Skin is warm and dry  No rash noted  Psychiatric: He has a normal mood and affect  Vitals reviewed  I personally reviewed labs and imaging reports for today  Last 24 Hours Medication List:     Current Facility-Administered Medications:  acetaminophen 650 mg Oral Q6H PRN Violet Crowe MD    aspirin 325 mg Oral Daily Violet Crowe MD    heparin (porcine) 5,000 Units Subcutaneous Q8H [facility] Violet Crowe MD    insulin glargine 8 Units Subcutaneous HS Violet Crowe MD    insulin lispro 1-5 Units Subcutaneous TID Cecilio Herrera MD    insulin lispro 3 Units Subcutaneous TID With Meals Violet Crowe MD    ondansetron 4 mg Intravenous Q8H PRN Violet Crowe MD    pravastatin 40 mg Oral HS Violet Crowe MD    sodium chloride 75 mL/hr Intravenous Continuous Violet Crowe MD Last Rate: 125 mL/hr (02/28/19 0346)   vancomycin 20 mg/kg Intravenous Q24H Violet Crowe MD Last Rate: 1,500 mg (02/27/19 8476)          Today, Patient Was Seen By: Violet Crowe MD    ** Please Note: Dictation voice to text software may have been used in the creation of this document   **

## 2019-02-28 NOTE — ASSESSMENT & PLAN NOTE
Patient has significant leukocytosis the exact etiology of which is unclear  Patient has been afebrile, leukocytosis decreased to 14,000 this morning  ESR is 32, procalcitonin is 0 4, C-reactive protein is 20  Blood culture showing no growth  Will continue IV vancomycin for now      I discussed the case with Dr Braden Spain who feels that the left knee prosthesis is not infected , will await ID's evaluation

## 2019-02-28 NOTE — ASSESSMENT & PLAN NOTE
lactic acidosis was present admission which was due to metformin most likely an acute kidney injury  Lactic acidosis resolved    Will continue holding metformin

## 2019-02-28 NOTE — PLAN OF CARE
Problem: Potential for Falls  Goal: Patient will remain free of falls  Description  INTERVENTIONS:  - Assess patient frequently for physical needs  -  Identify cognitive and physical deficits and behaviors that affect risk of falls  -  [place] fall precautions as indicated by assessment   - Educate patient/family on patient safety including physical limitations  - Instruct patient to call for assistance with activity based on assessment  - Modify environment to reduce risk of injury  - Consider OT/PT consult to assist with strengthening/mobility  Outcome: Progressing     Problem: Nutrition/Hydration-ADULT  Goal: Nutrient/Hydration intake appropriate for improving, restoring or maintaining nutritional needs  Description  Monitor and assess patient's nutrition/hydration status for malnutrition (ex- brittle hair, bruises, dry skin, pale skin and conjunctiva, muscle wasting, smooth red tongue, and disorientation)  Collaborate with interdisciplinary team and initiate plan and interventions as ordered  Monitor patient's weight and dietary intake as ordered or per policy  Utilize nutrition screening tool and intervene per policy  Determine patient's food preferences and provide high-protein, high-caloric foods as appropriate       INTERVENTIONS:  - Monitor oral intake, urinary output, labs, and treatment plans  - Assess nutrition and hydration status and recommend course of action  - Evaluate amount of meals eaten  - Assist patient with eating if necessary   - Allow adequate time for meals  - Recommend/ encourage appropriate diets, oral nutritional supplements, and vitamin/mineral supplements  - Order, calculate, and assess calorie counts as needed  - Recommend, monitor, and adjust tube feedings and TPN/PPN based on assessed needs  - Assess need for intravenous fluids  - Provide specific nutrition/hydration education as appropriate  - Include patient/family/caregiver in decisions related to nutrition  Outcome: Progressing     Problem: CARDIOVASCULAR - ADULT  Goal: Maintains optimal cardiac output and hemodynamic stability  Description  INTERVENTIONS:  - Monitor I/O, vital signs and rhythm  - Monitor for S/S and trends of decreased cardiac output i e  bleeding, hypotension  - Administer and titrate ordered vasoactive medications to optimize hemodynamic stability  - Assess quality of pulses, skin color and temperature  - Assess for signs of decreased coronary artery perfusion - ex   Angina  - Instruct patient to report change in severity of symptoms  Outcome: Progressing     Problem: RESPIRATORY - ADULT  Goal: Achieves optimal ventilation and oxygenation  Description  INTERVENTIONS:  - Assess for changes in respiratory status  - Assess for changes in mentation and behavior  - Position to facilitate oxygenation and minimize respiratory effort  - Oxygen administration by appropriate delivery method based on oxygen saturation (per order) or ABGs  - Initiate smoking cessation education as indicated  - Encourage broncho-pulmonary hygiene including cough, deep breathe, Incentive Spirometry  - Assess the need for suctioning and aspirate as needed  - Assess and instruct to report SOB or any respiratory difficulty  - Respiratory Therapy support as indicated  Outcome: Progressing     Problem: METABOLIC, FLUID AND ELECTROLYTES - ADULT  Goal: Fluid balance maintained  Description  INTERVENTIONS:  - Monitor labs and assess for signs and symptoms of volume excess or deficit  - Monitor I/O and WT  - Instruct patient on fluid and nutrition as appropriate  Outcome: Progressing  Goal: Glucose maintained within target range  Description  INTERVENTIONS:  - Monitor Blood Glucose as ordered  - Assess for signs and symptoms of hyperglycemia and hypoglycemia  - Administer ordered medications to maintain glucose within target range  - Assess nutritional intake and initiate nutrition service referral as needed  Outcome: Progressing     Problem: SKIN/TISSUE INTEGRITY - ADULT  Goal: Incision(s), wounds(s) or drain site(s) healing without S/S of infection  Description  INTERVENTIONS  - Assess and document risk factors for skin impairment   - Assess and document dressing, incision, wound bed, drain sites and surrounding tissue  - Initiate Nutrition services consult and/or wound management as needed  Outcome: Progressing

## 2019-02-28 NOTE — CONSULTS
Consultation - Infectious Disease   Navjot Bajwa 68 y o  male MRN: [id number]  Unit/Bed#: [address] [place] [address] [id number] Encounter: [id number]      Assessment/Plan     Assessment: 68y o  year old male with PMH of CHF, s/p AVR, s/p L TKR last month, AF who presents with dyspnea which started soon after he was discharged from here a couple weeks back  1  Leukocytosis, lactic acidosis - unclear etiology  Possibly reactive  CT C/A/P showed only proctocolitis but pt denies denies any abdominal pain or diarrhea to suggest infectious etiology in the setting of recent hospitalization earlier this month  UA was unremarkable and L knee shows no signs of active infection in the setting of recent L TKR last month  He remains afebrile and leukocytosis is improving  Blood cx are in process  Plan:  1  Continue vancomycin for now while waiting for blood cx  If negative and WBC count continues to trend down, would monitor off antibiotics with concern for infection being low  History of Present Illness   Physician Requesting Consult: Katty Bhatia MD  Reason for Consult / Principal Problem: leukocytosis  Hx and PE limited by: none  HPI: Navjot Bajwa is a 68y o  year old male with PMH of CHF, s/p AVR, s/p L TKR last month, AF who presents with dyspnea which started soon after he was discharged from here a couple weeks back  He was diagnosed with CHF exacerbation at that time after CT chest showed moderate bibasilar pleural effusions with upper lung zone patchy central opacities concerning for asymmetric pulmonary edema/CHF  He denies any headache, sore throat, rhinorrhea, chest pain, cough, abdominal pain, N/V/D, dysuria, or skin rashes  No issues with L TKR s/p surgery one month ago  On admission, he was afebrile with WBC count of 23 and lactic acid of 3  CT C/A/P showed proctocolitis  Blood cx are in process  UA was unremarkable  He was started on vancomycin       Inpatient consult to Infectious Diseases  Consult performed by: Rima Almaraz MD  Consult ordered by: Michelle Conteh MD          Review of Systems   Constitutional: Negative for chills, fatigue and fever  HENT: Negative for rhinorrhea and sore throat  Respiratory: Positive for shortness of breath  Negative for cough  Cardiovascular: Negative for chest pain and leg swelling  Gastrointestinal: Negative for abdominal pain, diarrhea, nausea and vomiting  Genitourinary: Negative for dysuria  Musculoskeletal: Negative for arthralgias  Skin: Negative for rash  Neurological: Negative for dizziness  Psychiatric/Behavioral: Negative for confusion         Historical Information   Past Medical History:   Diagnosis Date    Atrial fibrillation ([facility] [address]     Cancer ([email or web address] [facility]     Colitis     Fatty liver     History of chemotherapy     History of radiation therapy     History of shingles 2018    Pneumonia      Past Surgical History:   Procedure Laterality Date    AORTIC VALVE REPLACEMENT      COLECTOMY      COLONOSCOPY      DENTAL SURGERY      KNEE SURGERY      LYMPHADENECTOMY      OTHER SURGICAL HISTORY      MAZE PROCEDURE    AL TOTAL KNEE ARTHROPLASTY Left 1/28/2019    Procedure: ARTHROPLASTY LEFT KNEE TOTAL;  Surgeon: Tanisha Morrell MD;  Location: [facility] OR;  Service: Orthopedics     Social History   Social History     Substance and Sexual Activity   Alcohol Use Yes    Frequency: Monthly or less    Drinks per session: 1 or 2    Binge frequency: Never    Comment: has not drank in over a month     Social History     Substance and Sexual Activity   Drug Use No     Social History     Tobacco Use   Smoking Status Former Smoker    Packs/day: 1 00    Years: 40 00    Pack years: 40 00    Types: Cigarettes    Last attempt to quit: 2009    Years since quitting: 10 1   Smokeless Tobacco Never Used     Family History: non-contributory    Meds/Allergies   all current active meds have been reviewed    Allergies   Allergen Reactions    Ceftin [Cefuroxime] Itching Objective       Intake/Output Summary (Last 24 hours) at 2/28/2019 1143  Last data filed at 2/28/2019 8240  Gross per 24 hour   Intake 1490 ml   Output 1700 ml   Net -210 ml       Invasive Devices:   Peripheral IV 02/27/19 Left Antecubital (Active)   Site Assessment Clean;Dry; Intact 2/28/2019 12:00 AM   Dressing Type Transparent 2/28/2019 12:00 AM   Line Status Infusing 2/28/2019 12:00 AM   Dressing Status Clean;Dry; Intact 2/28/2019 12:00 AM   Reason Not Rotated Not due 2/28/2019 12:00 AM       Physical Exam   Constitutional: He appears well-developed and well-nourished  HENT:   Head: Normocephalic and atraumatic  Eyes: EOM are normal    Neck: Neck supple  Cardiovascular: Normal rate and regular rhythm  Pulmonary/Chest: Effort normal and breath sounds normal  He has no wheezes  Abdominal: Soft  Bowel sounds are normal  There is no tenderness  Musculoskeletal: He exhibits edema  He exhibits no tenderness  L TKA scar noted with edema but no tenderness or erythema noted  Skin: Skin is warm and dry  Vitals reviewed  Lab Results: I have personally reviewed pertinent labs  Imaging Studies: I have personally reviewed pertinent reports  EKG, Pathology, and Other Studies: I have personally reviewed pertinent reports  Counseling / Coordination of Care  Total floor / unit time spent today 35 minutes  Greater than 50% of total time was spent with the patient and / or family counseling and / or coordination of care   A description of the counseling / coordination of care:

## 2019-02-28 NOTE — ASSESSMENT & PLAN NOTE
Patient has mild hyponatremia on admission in the setting of hyperglycemia       Hyponatremia resolved this morning with better blood sugar control and normal saline solution

## 2019-02-28 NOTE — CONSULTS
Consultation - Cardiology   Navjot Bajwa 68 y o  male MRN: [id number]  Unit/Bed#: [address] [place] [address] [id number] Encounter: [id number]      Assessment:  Principal Problem:    EROS (acute kidney injury) ([facility] [address] )  Active Problems:    Type 2 diabetes mellitus without complication, without long-term current use of insulin (HCC)    Paroxysmal atrial fibrillation (HCC)    S/P total knee arthroplasty, left    Chronic diastolic congestive heart failure ([facility] [address] )    History of aortic valve replacement with bioprosthetic valve    Leukocytosis    Dyspnea    Hyponatremia    Lactic acidosis      Plan:    1  Dyspnea - Mr Yaz Sauceda appears to be describing platypnea, shortness of breath with standing up  Even with exertion he he does not have that much shortness of breath  He is currently walking the halls comfortably  He has no orthopnea or any signs of volume overload  This has been happening over the last month since his total knee replacement  During his last admission an echocardiogram was normal, with a normally functioning bioprosthetic aortic valve replacement and his CTA PE study was also normal   Lasix did not make this better, and I would suggest keeping off Lasix especially given his acute kidney injury  Internal Medicine is going to do a V/Q scan for completeness, given still the possibility of a pulmonary embolism  Otherwise, I would suggest having him on the medical regimen he was on prior to his knee replacement  2  Bioprosthetic aortic valve placement - Functioning normally by recent echocardiogram during his last admission  We could repeat 1, to look for any changes in RV or LV function  However he is not describing congestive heart failure  3  CAD - Prior history of a LIMA to the LAD  He is not describing angina  He is ambulating without symptoms  Continue outpatient medical therapy  4  PAF - Prior history of MAZE procedure with left atrial appendage ligation  Remains in sinus rhythm        History of Present Illness   Physician Requesting Consult: Atiya Mosley MD  Reason for Consult / Principal Problem: Dyspnea    HPI: Shruthi Espinosa is a 68y o  year old male who carries a history of coronary artery disease, aortic valve disease and paroxysmal atrial fibrillation  In 2014 he had a bioprosthetic aortic valve replacement, CABG x1 with a LIMA to the LAD, and MAZE with left atrial appendage ligation for his paroxysmal atrial fibrillation  He follows with Cardiology out of the [facility]  He has been stable from this standpoint  In late January he had a total knee replacement and since then he has had issues with shortness of breath  He describes platypnea, as it is positional shortness of breath with getting up  When he lays down he feels better  He is not having any orthopnea  He does have some exertional shortness of breath, but he notices it immediately upon standing  He has not showed any other signs of volume overload  He came back to the hospital a couple weeks ago, and a CTA of the chest did not reveal a pulmonary embolism  There were pleural effusions and therefore he was started on treatment for CHF  This ended up recent acute kidney injury and Lasix currently is on hold  He is showing no signs of volume overload  He has feel some chest heaviness with his shortness of breath but this is again with standing up  He does state that his blood pressure has been running low  He was found to have a leukocytosis, and he is anemic but this appears delusional   He denies palpitations he, he denies any lightheadedness or any recent syncope  He was tachycardic upon arrival     Consults    Review of Systems:  Please refer to the HPI for all cardiac and pulmonary review of systems  All other 10 systems were reviewed and are negative      Historical Information   Past Medical History:   Diagnosis Date    Atrial fibrillation ([facility] [address] )     Cancer ([facility] [address] )     Colitis     Fatty liver     History of chemotherapy     History of radiation therapy     History of shingles 2018    Pneumonia      Past Surgical History:   Procedure Laterality Date    AORTIC VALVE REPLACEMENT      COLECTOMY      COLONOSCOPY      DENTAL SURGERY      KNEE SURGERY      LYMPHADENECTOMY      OTHER SURGICAL HISTORY      MAZE PROCEDURE    MD TOTAL KNEE ARTHROPLASTY Left 1/28/2019    Procedure: ARTHROPLASTY LEFT KNEE TOTAL;  Surgeon: Soraya Gonzales MD;  Location: [facility] OR;  Service: Orthopedics     Social History     Substance and Sexual Activity   Alcohol Use Yes    Frequency: Monthly or less    Drinks per session: 1 or 2    Binge frequency: Never    Comment: has not drank in over a month     Social History     Substance and Sexual Activity   Drug Use No     Social History     Tobacco Use   Smoking Status Former Smoker    Packs/day: 1 00    Years: 40 00    Pack years: 40 00    Types: Cigarettes    Last attempt to quit: 2009    Years since quitting: 10 1   Smokeless Tobacco Never Used     Family History: non-contributory    Meds/Allergies   all current active meds have been reviewed  Allergies   Allergen Reactions    Ceftin [Cefuroxime] Itching         Current Facility-Administered Medications:     acetaminophen (TYLENOL) tablet 650 mg, 650 mg, Oral, Q6H PRN, Lin Price MD    aspirin tablet 325 mg, 325 mg, Oral, Daily, Lin Price MD, 325 mg at 02/28/19 0858    heparin (porcine) subcutaneous injection 5,000 Units, 5,000 Units, Subcutaneous, Q8H [address], 5,000 Units at 02/28/19 1319 **AND** [CANCELED] Platelet count, , , Once, Lin Price MD    insulin glargine (LANTUS) subcutaneous injection 8 Units 0 08 mL, 8 Units, Subcutaneous, HS, Lin Price MD, 8 Units at 02/27/19 2156    insulin lispro (HumaLOG) 100 units/mL subcutaneous injection 1-5 Units, 1-5 Units, Subcutaneous, TID AC **AND** Fingerstick Glucose (POCT), , , TID AC, Lin Price MD    insulin lispro (HumaLOG) 100 units/mL subcutaneous injection 3 Units, 3 Units, Subcutaneous, TID With Meals, Steph Álvarez MD, 3 Units at 02/28/19 0900    ondansetron (ZOFRAN) injection 4 mg, 4 mg, Intravenous, Q8H PRN, Steph Álvarez MD    pravastatin (PRAVACHOL) tablet 40 mg, 40 mg, Oral, HS, Steph Álvarez MD, 40 mg at 02/27/19 2156    sodium chloride 0 9 % infusion, 75 mL/hr, Intravenous, Continuous, Steph Álvarez MD, Last Rate: 75 mL/hr at 02/28/19 1321, 75 mL/hr at 02/28/19 1321    vancomycin (VANCOCIN) 1,500 mg in sodium chloride 0 9 % 250 mL IVPB, 20 mg/kg, Intravenous, Q24H, Steph Álvarez MD, Last Rate: 166 7 mL/hr at 02/27/19 1856, 1,500 mg at 02/27/19 1856    Objective   Vitals: Blood pressure 129/60, pulse 84, temperature 97 9 °F (36 6 °C), temperature source Oral, resp  rate 18, height 5' 11" (1 803 m), weight 73 8 kg (162 lb 11 2 oz), SpO2 100 %  , Body mass index is 22 69 kg/m² ,   Orthostatic Blood Pressures      Most Recent Value   Blood Pressure  129/60 filed at 02/28/2019 1500   Patient Position - Orthostatic VS  Lying filed at 02/28/2019 1500            Intake/Output Summary (Last 24 hours) at 2/28/2019 1532  Last data filed at 2/28/2019 1327  Gross per 24 hour   Intake 2315 ml   Output 1775 ml   Net 540 ml         Physical Exam:  GEN: Isidro Kc appears well, alert and oriented x 3, pleasant and cooperative   HEENT: pupils equal, round, and reactive to light; extraocular muscles intact  NECK: supple, no carotid bruits    No significant JVD   HEART: regular rhythm, normal S1 and S2, soft systolic ejection murmur, no clicks, gallops or rubs   LUNGS: clear to auscultation bilaterally; no wheezes, rales, or rhonchi   ABDOMEN: normal bowel sounds, soft, no tenderness, no distention  EXTREMITIES: peripheral pulses normal; no clubbing, cyanosis, or significant edema  NEURO: no focal findings   SKIN: normal without suspicious lesions on exposed skin    Lab Results:   Admission on 02/27/2019   Component Date Value    Sodium 02/27/2019 132*    Potassium 02/27/2019 4 5     Chloride 02/27/2019 89*    CO2 02/27/2019 31     ANION GAP 02/27/2019 12     BUN 02/27/2019 42*    Creatinine 02/27/2019 1 74*    Glucose 02/27/2019 231*    Calcium 02/27/2019 9 6     AST 02/27/2019 16     ALT 02/27/2019 21     Alkaline Phosphatase 02/27/2019 96     Total Protein 02/27/2019 7 6     Albumin 02/27/2019 4 3     Total Bilirubin 02/27/2019 0 60     eGFR 02/27/2019 38     WBC 02/27/2019 23 48*    RBC 02/27/2019 4 71     Hemoglobin 02/27/2019 12 9     Hematocrit 02/27/2019 39 9     MCV 02/27/2019 85     MCH 02/27/2019 27 4     MCHC 02/27/2019 32 3     RDW 02/27/2019 14 8     MPV 02/27/2019 9 9     Platelets 73/83/7469 484*    nRBC 02/27/2019 0     Neutrophils Relative 02/27/2019 85*    Immat GRANS % 02/27/2019 1     Lymphocytes Relative 02/27/2019 7*    Monocytes Relative 02/27/2019 7     Eosinophils Relative 02/27/2019 0     Basophils Relative 02/27/2019 0     Neutrophils Absolute 02/27/2019 20 14*    Immature Grans Absolute 02/27/2019 0 12     Lymphocytes Absolute 02/27/2019 1 55     Monocytes Absolute 02/27/2019 1 54*    Eosinophils Absolute 02/27/2019 0 03     Basophils Absolute 02/27/2019 0 10     Troponin I 02/27/2019 <0 02     LACTIC ACID 02/27/2019 3 0*    NT-proBNP 02/27/2019 416*    Protime 02/27/2019 14 3*    INR 02/27/2019 1 18*    PTT 02/27/2019 28     Color, UA 02/27/2019 Yellow     Clarity, UA 02/27/2019 Clear     Specific Gravity, UA 02/27/2019 1 015     pH, UA 02/27/2019 6 0     Leukocytes, UA 02/27/2019 Negative     Nitrite, UA 02/27/2019 Negative     Protein, UA 02/27/2019 Negative     Glucose, UA 02/27/2019 Negative     Ketones, UA 02/27/2019 Negative     Urobilinogen, UA 02/27/2019 0 2     Bilirubin, UA 02/27/2019 Negative     Blood, UA 02/27/2019 Negative     LACTIC ACID 02/27/2019 1 6     Ventricular Rate 02/27/2019 102     Atrial Rate 02/27/2019 102     IN Interval 02/27/2019 174     QRSD Interval 02/27/2019 124     QT Interval 02/27/2019 386     QTC Interval 02/27/2019 503     P Axis 02/27/2019 87     QRS Axis 02/27/2019 249     T Wave Axis 02/27/2019 77     Sed Rate 02/27/2019 32*    CRP 02/27/2019 20 9*    Procalcitonin 02/27/2019 0 48*    Hemoglobin A1C 02/27/2019 5 7     EAG 02/27/2019 117     POC Glucose 02/27/2019 131     POC Glucose 02/27/2019 159*    WBC 02/28/2019 14 26*    RBC 02/28/2019 3 64*    Hemoglobin 02/28/2019 9 9*    Hematocrit 02/28/2019 31 0*    MCV 02/28/2019 85     MCH 02/28/2019 27 2     MCHC 02/28/2019 31 9     RDW 02/28/2019 14 9     Platelets 57/14/5752 317     MPV 02/28/2019 10 2     Sodium 02/28/2019 138     Potassium 02/28/2019 3 7     Chloride 02/28/2019 98*    CO2 02/28/2019 32     ANION GAP 02/28/2019 8     BUN 02/28/2019 32*    Creatinine 02/28/2019 1 38*    Glucose 02/28/2019 121     Calcium 02/28/2019 8 7     AST 02/28/2019 12     ALT 02/28/2019 16     Alkaline Phosphatase 02/28/2019 83     Total Protein 02/28/2019 6 0*    Albumin 02/28/2019 3 2*    Total Bilirubin 02/28/2019 0 40     eGFR 02/28/2019 51     POC Glucose 02/28/2019 133     POC Glucose 02/28/2019 114      Labs & Results:    Results from last 7 days   Lab Units 02/27/19  1424   TROPONIN I ng/mL <0 02     Results from last 7 days   Lab Units 02/28/19  0436 02/27/19  1424   WBC Thousand/uL 14 26* 23 48*   HEMOGLOBIN g/dL 9 9* 12 9   HEMATOCRIT % 31 0* 39 9   PLATELETS Thousands/uL 317 484*         Results from last 7 days   Lab Units 02/28/19  0436 02/27/19  1424   POTASSIUM mmol/L 3 7 4 5   CHLORIDE mmol/L 98* 89*   CO2 mmol/L 32 31   BUN mg/dL 32* 42*   CREATININE mg/dL 1 38* 1 74*   CALCIUM mg/dL 8 7 9 6   ALK PHOS U/L 83 96   ALT U/L 16 21   AST U/L 12 16     Results from last 7 days   Lab Units 02/27/19  1454   INR  1 18*   PTT seconds 28             Imaging: I have personally reviewed pertinent reports      Ct Chest Abdomen Pelvis Wo Contrast    Result Date: 2/27/2019  Narrative: CT CHEST, ABDOMEN AND PELVIS WITHOUT IV CONTRAST INDICATION:   Chest pain  Shortness of breath  Constipation  Abnormal white blood cell count  Renal failure  COMPARISON:  February 14, 2019 TECHNIQUE: CT examination of the chest, abdomen and pelvis was performed without intravenous contrast   Axial, sagittal, and coronal 2D reformatted images were created from the source data and submitted for interpretation  Radiation dose length product (DLP) for this visit:  483 26 mGy-cm   This examination, like all CT scans performed in the [facility], was performed utilizing techniques to minimize radiation dose exposure, including the use of iterative  reconstruction and automated exposure control  Enteric contrast was not administered  FINDINGS: CHEST LUNGS:  Lungs are clear  There is no tracheal or endobronchial lesion  PLEURA:  Unremarkable  HEART/GREAT VESSELS:  Prosthetic aortic valve noted  Residual epicardial pacer wires noted  No thoracic aortic aneurysm  MEDIASTINUM AND TERRI:  Unremarkable  CHEST WALL AND LOWER NECK:   Unremarkable  ABDOMEN LIVER/BILIARY TREE:  There is a 12 mm cortical cyst in the medial left hepatic lobe  Liver is otherwise unremarkable  GALLBLADDER:  No calcified gallstones  No pericholecystic inflammatory change  SPLEEN:  Unremarkable  PANCREAS:  Unremarkable  ADRENAL GLANDS:  Unremarkable  KIDNEYS/URETERS:  Mild nonspecific bilateral perinephric stranding  There is a cortical cyst at the lower pole the left kidney  Renal vascular calcifications are noted  No hydronephrosis  No ureteral calculi  STOMACH AND BOWEL:  There is circumferential thickening of the wall the rectum with extensive perirectal inflammatory edema consistent with proctocolitis  There is a large duodenal diverticulum with layering debris  Right lower quadrant anastomotic staple line is noted  The remainder of stomach and bowel appear unremarkable   APPENDIX:  Surgically absent  ABDOMINOPELVIC CAVITY:  No ascites or free intraperitoneal air  No lymphadenopathy  VESSELS:  Unremarkable for patient's age  PELVIS REPRODUCTIVE ORGANS:  Unremarkable for patient's age  URINARY BLADDER:  Unremarkable  ABDOMINAL WALL/INGUINAL REGIONS:  Fat containing right inguinal hernia noted  OSSEOUS STRUCTURES:  No acute fracture or destructive osseous lesion  Corticated bilateral L5 pars interarticularis defects with grade 1 L5 relative to assess 1 anterolisthesis  Impression: Proctocolitis  No noncontrast CT abnormality to account for the patient's chest pain  Mild nonspecific perinephric stranding  No obstructive uropathy  Normal distention of the urinary bladder noted  Workstation performed: [id number]     X-ray Chest 2 Views    Result Date: 2/27/2019  Narrative: CHEST INDICATION:   chest pain  COMPARISON:  February 15, 2019 EXAM PERFORMED/VIEWS:  XR CHEST PA & LATERAL  The frontal view was performed utilizing dual energy radiographic technique  FINDINGS: Heart normal in size  Prior open heart surgery with aortic valve prosthesis  Pulmonary vessels unremarkable  The lungs are clear  No pneumothorax or pleural effusion  Osteoarthritis in both shoulders  Imaged bones otherwise unremarkable  Impression: No evidence of acute abnormality in the chest  Workstation performed: [id number]     Xr Chest Pa & Lateral    Result Date: 2/15/2019  Narrative: CHEST INDICATION:   Shortness of breath  COMPARISON:  Chest radiographs December 21, 2018 and CT pulmonary angiogram February 14, 2019  EXAM PERFORMED/VIEWS:  XR CHEST PA & LATERAL  The frontal view was performed utilizing dual energy radiographic technique  Images: 4 FINDINGS: The heart is enlarged  Atherosclerotic changes in the aorta  Median sternotomy with valve replacement  Epicardial pacing leads  Lungs are well aerated  Improving bilateral pleural effusions, right side greater than left    Improving groundglass infiltrates throughout the lungs bilaterally  Osseous structures appear within normal limits for patient age  Impression: Resolving congestive heart failure and bilateral pleural effusions  Workstation performed: [id number]     Xr Knee 3 Vw Left Non Injury    Result Date: 2/14/2019  Narrative: LEFT KNEE INDICATION:  Follow up surgery  COMPARISON: 6/19/2015  VIEWS:  AP and lateral IMAGES:  3 FINDINGS: Total knee arthroplasty appears in satisfactory position  No evidence of hardware failure  There is no acute fracture or dislocation  There is no joint effusion  No lytic or blastic lesions are seen  Soft tissues are unremarkable  Impression: Unremarkable appearance of total knee arthroplasty  Workstation performed: [id number]     Cta Ed Chest Pe Study    Result Date: 2/14/2019  Narrative: CTA - CHEST WITH IV CONTRAST - PULMONARY ANGIOGRAM INDICATION:   positive dimer, shortness of breath, respiratory insufficiency requiring supplemental oxygen  COMPARISON: 11/25/2018 TECHNIQUE: CTA examination of the chest was performed using angiographic technique according to a protocol specifically tailored to evaluate for pulmonary embolism  Axial, sagittal, and coronal 2D reformatted images were created from the source data and  submitted for interpretation  In addition, coronal 3D MIP postprocessing was performed on the acquisition scanner  Radiation dose length product (DLP) for this visit:  460 57 mGy-cm   This examination, like all CT scans performed in the [facility], was performed utilizing techniques to minimize radiation dose exposure, including the use of iterative  reconstruction and automated exposure control  IV Contrast:  85 mL of iohexol (OMNIPAQUE)  FINDINGS: PULMONARY ARTERIAL TREE:  No pulmonary embolus is seen  LUNGS:  Patchy central opacities predominantly involving the upper lungs are suspicious for asymmetric pulmonary edema  Infiltrates not excluded    Mild fissural fluid identified as well as mild interlobular septal thickening  PLEURA:  Moderate bibasilar pleural effusions noted  HEART/GREAT VESSELS:  Cardiac valvular prosthesis noted  MEDIASTINUM AND TERRI:  Unremarkable  CHEST WALL AND LOWER NECK:   Unremarkable  VISUALIZED STRUCTURES IN THE UPPER ABDOMEN:  Unremarkable  OSSEOUS STRUCTURES:  No acute fracture or destructive osseous lesion  Impression: 1  No PE  2   Moderate bibasilar pleural effusions with upper lung zone patchy central opacities concerning for asymmetric pulmonary edema/CHF  Infiltrates are not entirely excluded but considerably less likely  Workstation performed: [id number]     Vas Lower Limb Venous Duplex Study, Complete Bilateral    Result Date: 2/4/2019  Narrative:  [facility] REPORT CLINICAL: Indications: Patient presents with pain and swelling of his left leg s/p recent surgery  Patient had a left TKR on 1/28/2019, and has had pain, swelling and a fever since then  Risk Factors The patient has history of HTN, Diabetes (NIDDM) and HLD  FINDINGS:  Segment  Right            Left              Impression       Impression       CFV      Normal (Patent)  Normal (Patent)     CONCLUSION:  Impression: RIGHT LOWER LIMB: NORMAL No evidence of acute or chronic deep vein thrombosis  No evidence of superficial thrombophlebitis noted  Doppler evaluation shows a normal response to augmentation maneuvers  Popliteal, posterior tibial and anterior tibial arterial Doppler waveforms are triphasic/biphasic  LEFT LOWER LIMB: NORMAL No evidence of acute or chronic deep vein thrombosis  No evidence of superficial thrombophlebitis noted  Doppler evaluation shows a normal response to augmentation maneuvers  Popliteal, posterior tibial and anterior tibial arterial Doppler waveforms are triphasic/biphasic  Technical findings were given to Dr Rob Landin at [phone] on 2/4/2019    SIGNATURE: Electronically Signed by: Mary Sanford on 2019-02-04 05:48:55 PM      EKG:  Sinus rhythm with nonspecific interventricular conduction delay  No significant arrhythmias seen on telemetry review  Counseling / Coordination of Care  Total floor / unit time spent today 40 minutes  Greater than 50% of total time was spent with the patient and / or family counseling and / or coordination of care

## 2019-02-28 NOTE — PHYSICAL THERAPY NOTE
PHYSICAL THERAPY EVAL       02/28/19 1122   Note Type   Note type Eval only   Pain Assessment   Pain Assessment No/denies pain   Pain Score No Pain   Home Living   Type of Home House   Home Layout One level  (1 MIGUEL ANGEL)   Home Equipment Walker;Cane   Prior Function   Level of [place] Independent with ADLs and functional mobility   Lives With Spouse   Receives Help From Family   ADL Assistance Independent   IADLs Independent   Falls in the last 6 months 0   Restrictions/Precautions   Weight Bearing Precautions Per Order No   Other Precautions Multiple lines   General   Family/Caregiver Present No   Cognition   Overall Cognitive Status WFL   Orientation Level Oriented X4   Following Commands Follows all commands and directions without difficulty   RLE Assessment   RLE Assessment WFL   LLE Assessment   LLE Assessment WFL   Coordination   Sensation WFL   Light Touch   RLE Light Touch Grossly intact   LLE Light Touch Grossly intact   Bed Mobility   Supine to Sit 7  Independent   Transfers   Sit to Stand 6  Modified independent   Additional items Armrests   Stand to Sit 6  Modified independent   Additional items Armrests   Ambulation/Elevation   Gait pattern [place]ow[place] flexed   Gait Assistance 6  Modified independent   Assistive Device [facility]   Distance 200ft   Stair Management Assistance Not tested   Balance   Static Sitting Good   Dynamic Sitting Good   Static Standing Good   Dynamic Standing Good   Ambulatory Good   Endurance Deficit   Endurance Deficit No   Activity Tolerance   Activity Tolerance Patient tolerated treatment well   Medical Staff Made Aware SELENE Alvarado   Nurse Made Aware RN Shahriar   Assessment   Prognosis Good   Problem List Impaired balance   Assessment Patient is a 71y/o M who presented with SOB  Recent admisson for CHF  Knee replacement 1 month ago  Now found to have EROS, leukocytosis   PMH significant for AVR, A-fib, colitis, cancer, PNA, shingles  Patient lives with his spouse in a single level home with 1 step to enter  Has been using a SPC for ambulation  Plans to start outpatient P T  Soon  Patient performed bed mobility, transfers and amb independently  He appears to be at his baseline and has no inpatient p t  Needs  No SOB observed  D/c P T  Barriers to Discharge None   Goals   Patient Goals To go home and start outpatient P T  Recommendation   Recommendation D/C PT   Equipment Recommended Cane   Modified [place] Scale   Modified [place] Scale 1   Barthel Index   Feeding 10   Bathing 5   Grooming Score 5   Dressing Score 10   Bladder Score 10   Bowels Score 10   Toilet Use Score 10   Transfers (Bed/Chair) Score 15   Mobility (Level Surface) Score 15   Stairs Score 0   Barthel Index Score 90   Sobeida Taylor, PT            Patient Name: Isidro RIOJAS Date: 2/28/2019

## 2019-02-28 NOTE — ASSESSMENT & PLAN NOTE
Patient's acute renal failure as well who prerenal from decreased p o  Intake, Lasix  and irbesartan      Will continue to hold the above medication, will continue normal saline solution at 75 cc an hour, will monitor renal function with serial labs

## 2019-02-28 NOTE — ASSESSMENT & PLAN NOTE
Patient complains of chest pressure and dyspnea when standing or walking relieved by laying down or sitting down  I still do not see any signs of volume overload this morning  I encouraged patient to get of bed and walk around to see if this shortness of breath is still present when upright      Will await cardiology's recommendations or this could be associated with the aortic valve replacement

## 2019-02-28 NOTE — ASSESSMENT & PLAN NOTE
Lab Results   Component Value Date    HGBA1C 5 7 02/27/2019       Recent Labs     02/27/19  1811 02/27/19 2047 02/28/19  0722   POCGLU 131 159* 133       Blood Sugar Average: Last 72 hrs:  (P) 141       Blood sugars are well controlled at home, will hold the metformin icing of acute kidney injury lactic acidosis    The will continue Lantus and Humalog today

## 2019-03-01 ENCOUNTER — APPOINTMENT (INPATIENT)
Dept: NUCLEAR MEDICINE | Facility: HOSPITAL | Age: 74
DRG: 683 | End: 2019-03-01
Payer: COMMERCIAL

## 2019-03-01 LAB
ALBUMIN SERPL BCP-MCNC: 3 G/DL (ref 3.5–5)
ALP SERPL-CCNC: 99 U/L (ref 46–116)
ALT SERPL W P-5'-P-CCNC: 16 U/L (ref 12–78)
ANION GAP SERPL CALCULATED.3IONS-SCNC: 8 MMOL/L (ref 4–13)
AST SERPL W P-5'-P-CCNC: 14 U/L (ref 5–45)
BASOPHILS # BLD AUTO: 0.1 THOUSANDS/ΜL (ref 0–0.1)
BASOPHILS NFR BLD AUTO: 1 % (ref 0–1)
BILIRUB SERPL-MCNC: 0.4 MG/DL (ref 0.2–1)
BUN SERPL-MCNC: 19 MG/DL (ref 5–25)
CALCIUM SERPL-MCNC: 8.2 MG/DL (ref 8.3–10.1)
CHLORIDE SERPL-SCNC: 104 MMOL/L (ref 100–108)
CO2 SERPL-SCNC: 30 MMOL/L (ref 21–32)
CREAT SERPL-MCNC: 0.99 MG/DL (ref 0.6–1.3)
EOSINOPHIL # BLD AUTO: 0.25 THOUSAND/ΜL (ref 0–0.61)
EOSINOPHIL NFR BLD AUTO: 3 % (ref 0–6)
ERYTHROCYTE [DISTWIDTH] IN BLOOD BY AUTOMATED COUNT: 14.7 % (ref 11.6–15.1)
GFR SERPL CREATININE-BSD FRML MDRD: 76 ML/MIN/1.73SQ M
GLUCOSE SERPL-MCNC: 116 MG/DL (ref 65–140)
GLUCOSE SERPL-MCNC: 128 MG/DL (ref 65–140)
GLUCOSE SERPL-MCNC: 158 MG/DL (ref 65–140)
GLUCOSE SERPL-MCNC: 159 MG/DL (ref 65–140)
GLUCOSE SERPL-MCNC: 165 MG/DL (ref 65–140)
HCT VFR BLD AUTO: 32.8 % (ref 36.5–49.3)
HGB BLD-MCNC: 10.4 G/DL (ref 12–17)
IMM GRANULOCYTES # BLD AUTO: 0.04 THOUSAND/UL (ref 0–0.2)
IMM GRANULOCYTES NFR BLD AUTO: 1 % (ref 0–2)
LYMPHOCYTES # BLD AUTO: 1.79 THOUSANDS/ΜL (ref 0.6–4.47)
LYMPHOCYTES NFR BLD AUTO: 23 % (ref 14–44)
MCH RBC QN AUTO: 27.4 PG (ref 26.8–34.3)
MCHC RBC AUTO-ENTMCNC: 31.7 G/DL (ref 31.4–37.4)
MCV RBC AUTO: 87 FL (ref 82–98)
MONOCYTES # BLD AUTO: 0.71 THOUSAND/ΜL (ref 0.17–1.22)
MONOCYTES NFR BLD AUTO: 9 % (ref 4–12)
NEUTROPHILS # BLD AUTO: 4.95 THOUSANDS/ΜL (ref 1.85–7.62)
NEUTS SEG NFR BLD AUTO: 63 % (ref 43–75)
NRBC BLD AUTO-RTO: 0 /100 WBCS
PLATELET # BLD AUTO: 293 THOUSANDS/UL (ref 149–390)
PMV BLD AUTO: 9.9 FL (ref 8.9–12.7)
POTASSIUM SERPL-SCNC: 3.5 MMOL/L (ref 3.5–5.3)
PROT SERPL-MCNC: 5.9 G/DL (ref 6.4–8.2)
RBC # BLD AUTO: 3.79 MILLION/UL (ref 3.88–5.62)
SODIUM SERPL-SCNC: 142 MMOL/L (ref 136–145)
WBC # BLD AUTO: 7.84 THOUSAND/UL (ref 4.31–10.16)

## 2019-03-01 PROCEDURE — A9540 TC99M MAA: HCPCS

## 2019-03-01 PROCEDURE — 80053 COMPREHEN METABOLIC PANEL: CPT | Performed by: INTERNAL MEDICINE

## 2019-03-01 PROCEDURE — 82948 REAGENT STRIP/BLOOD GLUCOSE: CPT

## 2019-03-01 PROCEDURE — A9558 XE133 XENON 10MCI: HCPCS

## 2019-03-01 PROCEDURE — 85025 COMPLETE CBC W/AUTO DIFF WBC: CPT | Performed by: INTERNAL MEDICINE

## 2019-03-01 PROCEDURE — 78582 LUNG VENTILAT&PERFUS IMAGING: CPT

## 2019-03-01 PROCEDURE — 99232 SBSQ HOSP IP/OBS MODERATE 35: CPT | Performed by: INTERNAL MEDICINE

## 2019-03-01 RX ADMIN — INSULIN GLARGINE 8 UNITS: 100 INJECTION, SOLUTION SUBCUTANEOUS at 22:22

## 2019-03-01 RX ADMIN — INSULIN LISPRO 1 UNITS: 100 INJECTION, SOLUTION INTRAVENOUS; SUBCUTANEOUS at 16:56

## 2019-03-01 RX ADMIN — HEPARIN SODIUM 5000 UNITS: 5000 INJECTION INTRAVENOUS; SUBCUTANEOUS at 22:22

## 2019-03-01 RX ADMIN — ASPIRIN 325 MG ORAL TABLET 325 MG: 325 PILL ORAL at 09:36

## 2019-03-01 RX ADMIN — INSULIN LISPRO 3 UNITS: 100 INJECTION, SOLUTION INTRAVENOUS; SUBCUTANEOUS at 12:52

## 2019-03-01 RX ADMIN — PRAVASTATIN SODIUM 40 MG: 40 TABLET ORAL at 22:21

## 2019-03-01 RX ADMIN — HEPARIN SODIUM 5000 UNITS: 5000 INJECTION INTRAVENOUS; SUBCUTANEOUS at 06:26

## 2019-03-01 RX ADMIN — INSULIN LISPRO 3 UNITS: 100 INJECTION, SOLUTION INTRAVENOUS; SUBCUTANEOUS at 09:37

## 2019-03-01 RX ADMIN — HEPARIN SODIUM 5000 UNITS: 5000 INJECTION INTRAVENOUS; SUBCUTANEOUS at 13:43

## 2019-03-01 RX ADMIN — INSULIN LISPRO 3 UNITS: 100 INJECTION, SOLUTION INTRAVENOUS; SUBCUTANEOUS at 16:55

## 2019-03-01 RX ADMIN — SODIUM CHLORIDE 500 ML: 0.9 INJECTION, SOLUTION INTRAVENOUS at 16:50

## 2019-03-01 NOTE — PROGRESS NOTES
I saw patient again, patient's shortness of breath when standing up completely resolved and he has no chest pressure feeling either on standing up  He was walking without any chest pain or shortness of breath today  V/Q scan came back indeterminate  Since patient's shortness of breath and chest pressure on standing up and walking completely resolved I doubt that patient has acute PE  I asked him to continue to stand up today and walk  Should his symptoms recur will get a CT of the chest with contrast tomorrow to look for PE  In the meantime will hydrate him with normal saline solution 500 cc over 10 hours  Will recheck patient's CBC and renal function tomorrow    As long as patient's symptoms do not recur and his blood cultures remain negative will discharge him tomorrow

## 2019-03-01 NOTE — PROGRESS NOTES
Progress Note - Infectious Disease   Nikko Quinn 68 y o  male MRN: [id number]  Unit/Bed#: [address] [place] [address]  Encounter: [id number]    Assessment:  68y o  year old male with PMH of CHF, s/p AVR, s/p L TKR last month, AF who presents with dyspnea which started soon after he was discharged from here a couple weeks back       1  Leukocytosis, lactic acidosis - likely reactive and less likely infectious etiology  CT C/A/P showed only proctocolitis but pt denies denies any abdominal pain or diarrhea to suggest infectious etiology in the setting of recent hospitalization earlier this month  UA was unremarkable and L knee shows no signs of active infection in the setting of recent L TKR last month  He remains afebrile and leukocytosis has resolved  Blood cx are showing no growth to date       Plan:  1  D/C vancomycin and monitor off antibiotics with concern for infection being low  If pt remains afebrile for >24h, he can then be discharged  Subjective/Objective   Chief Complaint: leukocytosis    Subjective: feels well and denies any complaints    Objective:     Temp:  [97 9 °F (36 6 °C)-98 1 °F (36 7 °C)] 97 9 °F (36 6 °C)  HR:  [80-84] 80  Resp:  [18-20] 20  BP: (129-136)/(60-64) 136/64  SpO2:  [97 %-100 %] 98 %  Temp (24hrs), Av °F (36 7 °C), Min:97 9 °F (36 6 °C), Max:98 1 °F (36 7 °C)  Current: Temperature: 97 9 °F (36 6 °C)    Physical Exam:   Constitutional: He appears well-developed and well-nourished  Cardiovascular: Normal rate and regular rhythm  Pulmonary/Chest: Effort normal and breath sounds normal  He has no wheezes  Abdominal: Soft  Bowel sounds are normal  There is no tenderness  Musculoskeletal: He exhibits edema  He exhibits no tenderness  L TKA scar noted with edema but no tenderness or erythema noted  Skin: Skin is warm and dry  Vitals reviewed            Invasive Devices     Peripheral Intravenous Line            Peripheral IV 19 Left Antecubital 1 day                Lab, Imaging and other studies: I have personally reviewed pertinent reports

## 2019-03-01 NOTE — PROGRESS NOTES
Progress Note - Shruthi Diver 1945, 68 y o  male MRN: [id number]    Unit/Bed#: [address] [place], [place] [id number] Encounter: [id number]    Primary Care Provider: Javier Denis DO   Date and time admitted to hospital: 2/27/2019  2:06 PM        * EROS (acute kidney injury) [facility])  Assessment & Plan  Patient admitted with acute kidney injury that was most likely prerenal due to medications, decreased p o  Intake  Acute kidney injury resolved with IV fluid hydration  Creatinine returned to baseline of 0 9    Dyspnea  Assessment & Plan  Patient complains of chest pressure and dyspnea when standing or walking relieved by laying down or sitting down  There is no evidence of volume overload  Patient had a V/Q scan results of which are pending    Leukocytosis  Assessment & Plan  Patient had leukocytosis on admission  The exact etiology of this is unclear     Blood culture showing no growth  I discussed the case with Dr Kerline Bhatia, will observe the patient off antibiotics and will monitor blood counts    Type 2 diabetes mellitus without complication, without long-term current use of insulin [facility])  Assessment & Plan  Lab Results   Component Value Date    HGBA1C 5 7 02/27/2019       Recent Labs     02/28/19  1602 02/28/19  2103 03/01/19  0718 03/01/19  1154   POCGLU 163* 145* 116 128       Blood Sugar Average: Last 72 hrs:  (P) 136 125        continue Lantus and Humalog today in the hospital    Chronic diastolic congestive heart failure [facility])  Assessment & Plan  Patient has no evidence of volume overload, his chronic diastolic CHF is at baseline  Will stop IV fluids will continue holding Lasix today    Hyponatremia  Assessment & Plan  Patient has mild hyponatremia on admission in the setting of hyperglycemia       Hyponatremia resolved        VTE Prophylaxis: in place    Patient Centered Rounds: I rounded with patient's nurse    Current Length of Stay: 2 day(s)    Current Patient Status: Inpatient    Certification Statement: Pt requires additional inpatient hospital stay due to: see assessment and plan        Subjective:   Patient denies any chest pain, cough, pleurisy, abdominal pain, diarrhea, difficulty hearing, dysuria  Denies dyspnea at rest   I encouraged patient to stand up and start walking around    All other ROS are negative    Objective:     Vitals:   Temp (24hrs), Av °F (36 7 °C), Min:97 9 °F (36 6 °C), Max:98 1 °F (36 7 °C)    Temp:  [97 9 °F (36 6 °C)-98 1 °F (36 7 °C)] 97 9 °F (36 6 °C)  HR:  [80-84] 80  Resp:  [18-20] 20  BP: (129-136)/(60-64) 136/64  SpO2:  [97 %-100 %] 98 %  Body mass index is 24 48 kg/m²  Input and Output Summary (last 24 hours): Intake/Output Summary (Last 24 hours) at 3/1/2019 1212  Last data filed at 3/1/2019 0949  Gross per 24 hour   Intake 825 ml   Output 1890 ml   Net -1065 ml       Physical Exam:     Physical Exam   Constitutional: He appears well-developed  No distress  HENT:   Head: Normocephalic  Mouth/Throat: Oropharynx is clear and moist    Eyes: Conjunctivae are normal    Neck: Neck supple  No JVD present  Cardiovascular: Normal rate and regular rhythm  Pulmonary/Chest: Effort normal  No respiratory distress  He has no wheezes  He has no rales  Abdominal: Soft  Bowel sounds are normal  He exhibits no distension  There is no tenderness  Musculoskeletal:   There is no signs of infection of the left knee replacement   Lymphadenopathy:     He has no cervical adenopathy  Neurological: He is alert  No cranial nerve deficit  Skin: Skin is warm and dry  No rash noted  Psychiatric: He has a normal mood and affect  Vitals reviewed  I personally reviewed labs and imaging reports for today        Last 24 Hours Medication List:     Current Facility-Administered Medications:  acetaminophen 650 mg Oral Q6H PRN Noelle De Leon MD   aspirin 325 mg Oral Daily Noelle De Leon MD   heparin (porcine) 5,000 Units Subcutaneous [facility] Noelle De Leon MD   insulin glargine 8 Units Subcutaneous HS King Dejan MD   insulin lispro 1-5 Units Subcutaneous TID AC King Dejan MD   insulin lispro 3 Units Subcutaneous TID With Meals King Dejan MD   ondansetron 4 mg Intravenous Q8H PRN King Dejan MD   pravastatin 40 mg Oral HS King Dejan MD          Today, Patient Was Seen By: King Dejan MD    ** Please Note: Dictation voice to text software may have been used in the creation of this document   **

## 2019-03-01 NOTE — ASSESSMENT & PLAN NOTE
Patient has mild hyponatremia on admission in the setting of hyperglycemia       Hyponatremia resolved

## 2019-03-01 NOTE — ASSESSMENT & PLAN NOTE
Patient complains of chest pressure and dyspnea when standing or walking relieved by laying down or sitting down  There is no evidence of volume overload    Patient had a V/Q scan results of which are pending

## 2019-03-01 NOTE — ASSESSMENT & PLAN NOTE
Patient has no evidence of volume overload, his chronic diastolic CHF is at baseline    Will stop IV fluids will continue holding Lasix today

## 2019-03-01 NOTE — ASSESSMENT & PLAN NOTE
Lab Results   Component Value Date    HGBA1C 5 7 02/27/2019       Recent Labs     02/28/19  1602 02/28/19  2103 03/01/19  0718 03/01/19  1154   POCGLU 163* 145* 116 128       Blood Sugar Average: Last 72 hrs:  (P) 136 125        continue Lantus and Humalog today in the hospital

## 2019-03-01 NOTE — ASSESSMENT & PLAN NOTE
Patient had leukocytosis on admission  The exact etiology of this is unclear     Blood culture showing no growth    I discussed the case with Dr Vanessa Chapman, will observe the patient off antibiotics and will monitor blood counts

## 2019-03-02 ENCOUNTER — APPOINTMENT (INPATIENT)
Dept: CT IMAGING | Facility: HOSPITAL | Age: 74
DRG: 683 | End: 2019-03-02
Payer: COMMERCIAL

## 2019-03-02 VITALS
HEART RATE: 92 BPM | OXYGEN SATURATION: 98 % | TEMPERATURE: 98.4 F | SYSTOLIC BLOOD PRESSURE: 158 MMHG | RESPIRATION RATE: 20 BRPM | WEIGHT: 164.02 LBS | HEIGHT: 71 IN | BODY MASS INDEX: 22.96 KG/M2 | DIASTOLIC BLOOD PRESSURE: 75 MMHG

## 2019-03-02 LAB
ANION GAP SERPL CALCULATED.3IONS-SCNC: 8 MMOL/L (ref 4–13)
BASOPHILS # BLD AUTO: 0.07 THOUSANDS/ΜL (ref 0–0.1)
BASOPHILS NFR BLD AUTO: 1 % (ref 0–1)
BUN SERPL-MCNC: 11 MG/DL (ref 5–25)
CALCIUM SERPL-MCNC: 8.6 MG/DL (ref 8.3–10.1)
CHLORIDE SERPL-SCNC: 105 MMOL/L (ref 100–108)
CO2 SERPL-SCNC: 30 MMOL/L (ref 21–32)
CREAT SERPL-MCNC: 0.87 MG/DL (ref 0.6–1.3)
EOSINOPHIL # BLD AUTO: 0.36 THOUSAND/ΜL (ref 0–0.61)
EOSINOPHIL NFR BLD AUTO: 4 % (ref 0–6)
ERYTHROCYTE [DISTWIDTH] IN BLOOD BY AUTOMATED COUNT: 14.6 % (ref 11.6–15.1)
GFR SERPL CREATININE-BSD FRML MDRD: 86 ML/MIN/1.73SQ M
GLUCOSE SERPL-MCNC: 110 MG/DL (ref 65–140)
GLUCOSE SERPL-MCNC: 120 MG/DL (ref 65–140)
HCT VFR BLD AUTO: 31.9 % (ref 36.5–49.3)
HGB BLD-MCNC: 10.1 G/DL (ref 12–17)
IMM GRANULOCYTES # BLD AUTO: 0.04 THOUSAND/UL (ref 0–0.2)
IMM GRANULOCYTES NFR BLD AUTO: 1 % (ref 0–2)
LYMPHOCYTES # BLD AUTO: 2.03 THOUSANDS/ΜL (ref 0.6–4.47)
LYMPHOCYTES NFR BLD AUTO: 25 % (ref 14–44)
MCH RBC QN AUTO: 27.4 PG (ref 26.8–34.3)
MCHC RBC AUTO-ENTMCNC: 31.7 G/DL (ref 31.4–37.4)
MCV RBC AUTO: 87 FL (ref 82–98)
MONOCYTES # BLD AUTO: 0.75 THOUSAND/ΜL (ref 0.17–1.22)
MONOCYTES NFR BLD AUTO: 9 % (ref 4–12)
NEUTROPHILS # BLD AUTO: 5.01 THOUSANDS/ΜL (ref 1.85–7.62)
NEUTS SEG NFR BLD AUTO: 60 % (ref 43–75)
NRBC BLD AUTO-RTO: 0 /100 WBCS
PLATELET # BLD AUTO: 304 THOUSANDS/UL (ref 149–390)
PMV BLD AUTO: 10.1 FL (ref 8.9–12.7)
POTASSIUM SERPL-SCNC: 3.3 MMOL/L (ref 3.5–5.3)
RBC # BLD AUTO: 3.68 MILLION/UL (ref 3.88–5.62)
SODIUM SERPL-SCNC: 143 MMOL/L (ref 136–145)
WBC # BLD AUTO: 8.26 THOUSAND/UL (ref 4.31–10.16)

## 2019-03-02 PROCEDURE — 82948 REAGENT STRIP/BLOOD GLUCOSE: CPT

## 2019-03-02 PROCEDURE — 71275 CT ANGIOGRAPHY CHEST: CPT

## 2019-03-02 PROCEDURE — 99239 HOSP IP/OBS DSCHRG MGMT >30: CPT | Performed by: INTERNAL MEDICINE

## 2019-03-02 PROCEDURE — 85025 COMPLETE CBC W/AUTO DIFF WBC: CPT | Performed by: INTERNAL MEDICINE

## 2019-03-02 PROCEDURE — 80048 BASIC METABOLIC PNL TOTAL CA: CPT | Performed by: INTERNAL MEDICINE

## 2019-03-02 RX ORDER — FUROSEMIDE 20 MG/1
40 TABLET ORAL DAILY
Qty: 30 TABLET | Refills: 0 | Status: SHIPPED | OUTPATIENT
Start: 2019-03-02 | End: 2019-03-11

## 2019-03-02 RX ADMIN — ASPIRIN 325 MG ORAL TABLET 325 MG: 325 PILL ORAL at 10:00

## 2019-03-02 RX ADMIN — HEPARIN SODIUM 5000 UNITS: 5000 INJECTION INTRAVENOUS; SUBCUTANEOUS at 06:22

## 2019-03-02 RX ADMIN — IOHEXOL 85 ML: 350 INJECTION, SOLUTION INTRAVENOUS at 09:18

## 2019-03-02 NOTE — ASSESSMENT & PLAN NOTE
Patient admitted with acute kidney injury that was most likely prerenal due to medications, decreased p o  Intake  Acute kidney injury resolved with IV fluid hydration    Creatinine returned to baseline of 0 87 this morning

## 2019-03-02 NOTE — ASSESSMENT & PLAN NOTE
lactic acidosis was present admission which was due to metformin most likely an acute kidney injury  Lactic acidosis resolved  This was not associated with severe sepsis!

## 2019-03-02 NOTE — ASSESSMENT & PLAN NOTE
Patient described complains of platypnea:  Shortness of breath on standing up resolved with sitting down or laying down  Echocardiogram earlier this month described no ASD or patent foramen ovale  Bioprosthetic aortic valve was working normally  Patient has no liver disease that could result in hepatopulmonary syndrome  CT scan on admission showed no CHF neither did chest x-ray and he has had no evidence of volume overload on physical examination throughout the hospital stay  He has remained on room air with normal saturations  CT scan of the chest I did on day of discharge to see if patient could have pulmonary embolism as his V/Q scan was indeterminate revealed no pulmonary embolism, lung masses or pneumonia  Suggested mild fluid overload after IV hydration for his acute kidney injury  On day of discharge I still do not see evidence of acute CHF:  Patient has no JVD lungs are clear to auscultation he has no lower extremity edema       I asked patient to resume his Lasix and take it for 2 days then stop it  I want to avoid further acute kidney injury  I asked patient to follow up with Cardiology and his primary care doctor who will be evaluating his volume status  The etiology of his platypnea remained unknown!

## 2019-03-02 NOTE — ASSESSMENT & PLAN NOTE
Patient had leukocytosis on admission  The exact etiology of this is unclear but was likely reactive to acute kidney injury  Blood cultures were drawn admission that remained showing no growth of bacterial pathogens  Patient was initially started on IV vancomycin that as per ID recommendation yesterday I discontinued the antibiotic  Patient remained afebrile and leukocyte count is normal at 8 26 today  Blood cultures are remain no growth  Will discharge patient without antibiotics

## 2019-03-02 NOTE — ASSESSMENT & PLAN NOTE
Patient has mild hyponatremia on admission in the setting of hyperglycemia       Hyponatremia resolved with hydration and control of hyperglycemia

## 2019-03-02 NOTE — PROGRESS NOTES
Progress Note - Cierra Garcia 1945, 68 y o  male MRN: [id number]    Unit/Bed#: [address] [place] [address] Encounter: [id number]    Primary Care Provider: Amari Foster DO   Date and time admitted to hospital: 2/27/2019  2:06 PM        * EROS (acute kidney injury) [facility])  Assessment & Plan  Patient admitted with acute kidney injury that was most likely prerenal due to medications, decreased p o  Intake  Acute kidney injury resolved with IV fluid hydration  Creatinine returned to baseline of 0 87 this morning    Dyspnea  Assessment & Plan  Patient still has mild dyspnea on standing up and walking  The chest pressure on standing up at times comes back  Will get CT chest  of the chest rule out PE now the patient's renal function is normal    Leukocytosis  Assessment & Plan  Patient had leukocytosis on admission  The exact etiology of this is unclear     Blood culture showed no growth  Continue observing without antibiotics    Type 2 diabetes mellitus without complication, without long-term current use of insulin [facility])  Assessment & Plan  Lab Results   Component Value Date    HGBA1C 5 7 02/27/2019       Recent Labs     03/01/19  1154 03/01/19  1645 03/01/19  2058 03/02/19  0718   POCGLU 128 158* 165* 120       Blood Sugar Average: Last 72 hrs:  (P) 139 [id number]     Blood sugars are controlled   continue Lantus and Humalog today in the hospital    Chronic diastolic congestive heart failure ([facility] [address] )  Assessment & Plan  Patient has no evidence of volume overload, his chronic diastolic CHF is at baseline  VTE Prophylaxis: in place    Patient Centered Rounds: I rounded with patient's nurse    Current Length of Stay: 3 day(s)    Current Patient Status: Inpatient    Certification Statement: Pt requires additional inpatient hospital stay due to: see assessment and plan        Subjective: At times shortness of breath and chest pressure comes back when patient stands up is not completely gone    Denies any pleurisy, nausea abdominal pain, diarrhea, dysuria, signs of bleeding    All other ROS are negative    Objective:     Vitals:   Temp (24hrs), Av 4 °F (36 9 °C), Min:98 2 °F (36 8 °C), Max:98 5 °F (36 9 °C)    Temp:  [98 2 °F (36 8 °C)-98 5 °F (36 9 °C)] 98 2 °F (36 8 °C)  HR:  [83-85] 83  Resp:  [18] 18  BP: (152-160)/(67-72) 160/72  SpO2:  [96 %-99 %] 96 %  Body mass index is 22 88 kg/m²  Input and Output Summary (last 24 hours): Intake/Output Summary (Last 24 hours) at 3/2/2019 0744  Last data filed at 3/2/2019 0201  Gross per 24 hour   Intake    Output 1100 ml   Net -1100 ml       Physical Exam:     Physical Exam   Constitutional: He appears well-developed  No distress  HENT:   Head: Normocephalic  Mouth/Throat: Oropharynx is clear and moist    Eyes: Conjunctivae are normal    Neck: Neck supple  Cardiovascular: Normal rate and regular rhythm  Pulmonary/Chest: Effort normal  No respiratory distress  He has no wheezes  He has no rales  Abdominal: Soft  Bowel sounds are normal  He exhibits no distension  There is no tenderness  Musculoskeletal: He exhibits no tenderness  There is no left knee infection   Lymphadenopathy:     He has no cervical adenopathy  Neurological: He is alert  No cranial nerve deficit  Skin: Skin is warm and dry  No rash noted  Psychiatric: He has a normal mood and affect  Vitals reviewed  I personally reviewed labs and imaging reports for today        Last 24 Hours Medication List:     Current Facility-Administered Medications:  acetaminophen 650 mg Oral Q6H PRN Atiya Mosley MD   aspirin 325 mg Oral Daily Atiya Mosley MD   heparin (porcine) 5,000 Units Subcutaneous Q8H [address] Atiya Mosley MD   insulin glargine 8 Units Subcutaneous HS Atiya Mosley MD   insulin lispro 1-5 Units Subcutaneous TID Kel Hutchison MD   insulin lispro 3 Units Subcutaneous TID With Meals Atiya Mosley MD   ondansetron 4 mg Intravenous Q8H PRN Atiya Mosley MD pravastatin 40 mg Oral HS Robina Gomez MD          Today, Patient Was Seen By: Robina Gomez MD    ** Please Note: Dictation voice to text software may have been used in the creation of this document   **

## 2019-03-02 NOTE — ASSESSMENT & PLAN NOTE
Lab Results   Component Value Date    HGBA1C 5 7 02/27/2019       Recent Labs     03/01/19  1154 03/01/19  1645 03/01/19 2058 03/02/19  0718   POCGLU 128 158* 165* 120       Blood Sugar Average: Last 72 hrs:  (P) 139 [id number]     Blood sugars are controlled  I asked him home to hold metformin for 2 days since he received IV contrast today then he will resume metformin as an outpatient

## 2019-03-02 NOTE — ASSESSMENT & PLAN NOTE
Patient had leukocytosis on admission  The exact etiology of this is unclear     Blood culture showed no growth    Continue observing without antibiotics

## 2019-03-02 NOTE — ASSESSMENT & PLAN NOTE
Patient still has mild dyspnea on standing up and walking  The chest pressure on standing up at times comes back    Will get CT chest  of the chest rule out PE now the patient's renal function is normal

## 2019-03-02 NOTE — DISCHARGE SUMMARY
Discharge- Pablo Ramirez 1945, 68 y o  male MRN: [id number]    Unit/Bed#: [address] [place] [address] Encounter: [id number]    Primary Care Provider: Tiffanie Mosqueda DO   Date and time admitted to hospital: 2/27/2019  2:06 PM        * EROS (acute kidney injury) [facility])  Assessment & Plan  Patient was admitted with acute kidney injury that was most likely prerenal due to medications including Lasix, Aldactone and irbesartan as well as decreased p o  Intake  Acute kidney injury resolved with IV fluid hydration  Creatinine returned to baseline of 0 87 this morning from 1 7 on admission  I asked patient to resume his irbesartan at home but to stop his spironolactone    Dyspnea  Assessment & Plan  Patient described complains of platypnea:  Shortness of breath on standing up resolved with sitting down or laying down  Echocardiogram earlier this month described no ASD or patent foramen ovale  Bioprosthetic aortic valve was working normally  Patient has no liver disease that could result in hepatopulmonary syndrome  CT scan on admission showed no CHF neither did chest x-ray and he has had no evidence of volume overload on physical examination throughout the hospital stay  He has remained on room air with normal saturations  CT scan of the chest I did on day of discharge to see if patient could have pulmonary embolism as his V/Q scan was indeterminate revealed no pulmonary embolism, lung masses or pneumonia  Suggested mild fluid overload after IV hydration for his acute kidney injury  On day of discharge I still do not see evidence of acute CHF:  Patient has no JVD lungs are clear to auscultation he has no lower extremity edema       I asked patient to resume his Lasix and take it for 2 days then stop it  I want to avoid further acute kidney injury  I asked patient to follow up with Cardiology and his primary care doctor who will be evaluating his volume status      The etiology of his platypnea remained unknown! Leukocytosis  Assessment & Plan  Patient had leukocytosis on admission  The exact etiology of this is unclear but was likely reactive to acute kidney injury  Blood cultures were drawn admission that remained showing no growth of bacterial pathogens  Patient was initially started on IV vancomycin that as per ID recommendation yesterday I discontinued the antibiotic  Patient remained afebrile and leukocyte count is normal at 8 26 today  Blood cultures are remain no growth  Will discharge patient without antibiotics  Type 2 diabetes mellitus without complication, without long-term current use of insulin [facility])  Assessment & Plan  Lab Results   Component Value Date    HGBA1C 5 7 02/27/2019       Recent Labs     03/01/19  1154 03/01/19  1645 03/01/19  2058 03/02/19  0718   POCGLU 128 158* 165* 120       Blood Sugar Average: Last 72 hrs:  (P) 139 [id number]     Blood sugars are controlled  I asked him home to hold metformin for 2 days since he received IV contrast today then he will resume metformin as an outpatient    Chronic diastolic congestive heart failure ([facility] [address] )  Assessment & Plan  Patient has no evidence of volume overload, his chronic diastolic CHF is at baseline  Will continue irbesartan as an outpatient    History of aortic valve replacement with bioprosthetic valve  Assessment & Plan  Echocardiogram 2 weeks ago showed no valvular vegetations  Hyponatremia  Assessment & Plan  Patient has mild hyponatremia on admission in the setting of hyperglycemia       Hyponatremia resolved with hydration and control of hyperglycemia    Paroxysmal atrial fibrillation [facility])  Assessment & Plan  Patient currently is in normal sinus rhythm , will continue metoprolol    S/P total knee arthroplasty, left  Assessment & Plan  Orthopedics saw the patient and felt that patient has no infection left total knee arthroplasty, he continued to show no signs of left total knee replacement infection throughout the hospital stay    Lactic acidosis  Assessment & Plan  lactic acidosis was present admission which was due to metformin most likely an acute kidney injury  Lactic acidosis resolved  This was not associated with severe sepsis! Hospital Course:     Herminia Roca is a 68 y o  male patient who originally presented to the hospital on   Admission Orders (From admission, onward)    Ordered        02/27/19 1629  Inpatient Admission  Once     Order ID Start Status   [id number] 02/27/19 1630 Completed             due to platypnea    Please see above list of diagnoses and related plan for additional information  Condition at Discharge:  good      Discharge instructions/Information to patient and family:   See after visit summary for information provided to patient and family  Provisions for Follow-Up Care:  See after visit summary for information related to follow-up care and any pertinent home health orders  Disposition:     Home       Discharge Statement:  I spent 34 minutes discharging the patient  This time was spent on the day of discharge  I had direct contact with the patient on the day of discharge  Greater than 50% of the total time was spent examining patient, answering all patient questions, arranging and discussing plan of care with patient as well as directly providing post-discharge instructions  Additional time then spent on discharge activities  Discharge Medications:  See after visit summary for reconciled discharge medications provided to patient and family        ** Please Note: This note has been constructed using a voice recognition system **

## 2019-03-02 NOTE — ASSESSMENT & PLAN NOTE
Lab Results   Component Value Date    HGBA1C 5 7 02/27/2019       Recent Labs     03/01/19  1154 03/01/19  1645 03/01/19 2058 03/02/19  0718   POCGLU 128 158* 165* 120       Blood Sugar Average: Last 72 hrs:  (P) 139 [id number]     Blood sugars are controlled   continue Lantus and Humalog today in the hospital

## 2019-03-02 NOTE — DISCHARGE INSTRUCTIONS
Hold metformin for 2 days then  resume the same dose  take Lasix 40 mg in the morning for 2 days then stop  Take potassium chloride that you have at home for 2 days with Lasix 40 then stop  Please follow up with the heart doctors near family doctor!

## 2019-03-02 NOTE — ASSESSMENT & PLAN NOTE
Patient was admitted with acute kidney injury that was most likely prerenal due to medications including Lasix, Aldactone and irbesartan as well as decreased p o  Intake  Acute kidney injury resolved with IV fluid hydration  Creatinine returned to baseline of 0 87 this morning from 1 7 on admission      I asked patient to resume his irbesartan at home but to stop his spironolactone

## 2019-03-02 NOTE — ASSESSMENT & PLAN NOTE
Patient has no evidence of volume overload, his chronic diastolic CHF is at baseline    Will continue irbesartan as an outpatient

## 2019-03-02 NOTE — ASSESSMENT & PLAN NOTE
Orthopedics saw the patient and felt that patient has no infection left total knee arthroplasty, he continued to show no signs of left total knee replacement infection throughout the hospital stay

## 2019-03-02 NOTE — PROGRESS NOTES
Progress Note - Infectious Disease   Huan Gauthier 68 y o  male MRN: [id number]  Unit/Bed#: [address] [place] [address]  Encounter: [id number]    Assessment:  68 y  o  year old male with PMH of CHF, s/p AVR, s/p L TKR last month, AF who presents with dyspnea which started soon after he was discharged from here a couple weeks back       1  Leukocytosis, lactic acidosis - likely reactive and less likely infectious etiology  CT C/A/P showed only proctocolitis but pt denies denies any abdominal pain or diarrhea to suggest infectious etiology in the setting of recent hospitalization earlier this month  UA was unremarkable and L knee shows no signs of active infection in the setting of recent L TKR last month  He remains afebrile and leukocytosis has resolved off abx  Blood cx are showing no growth to date       Plan:  1  Stable for discharge off antibiotics with concern for infection being low  Subjective/Objective   Chief Complaint: leukocytosis    Subjective: feels well and denies any complaints    Objective:     Temp:  [98 2 °F (36 8 °C)-98 5 °F (36 9 °C)] 98 4 °F (36 9 °C)  HR:  [83-92] 92  Resp:  [18-20] 20  BP: (152-160)/(67-75) 158/75  SpO2:  [96 %-99 %] 98 %  Temp (24hrs), Av 4 °F (36 9 °C), Min:98 2 °F (36 8 °C), Max:98 5 °F (36 9 °C)  Current: Temperature: 98 4 °F (36 9 °C)    Physical Exam:   Constitutional: He appears well-developed and well-nourished  Cardiovascular: Normal rate and regular rhythm  Pulmonary/Chest: Effort normal and breath sounds normal  He has no wheezes  Abdominal: Soft  Bowel sounds are normal  There is no tenderness  Musculoskeletal: He exhibits edema  He exhibits no tenderness    L TKA scar noted with edema but no tenderness or erythema noted    Skin: Skin is warm and dry    Vitals reviewed  Invasive Devices          None          Lab, Imaging and other studies: I have personally reviewed pertinent reports

## 2019-03-04 ENCOUNTER — TRANSITIONAL CARE MANAGEMENT (OUTPATIENT)
Dept: FAMILY MEDICINE CLINIC | Facility: HOSPITAL | Age: 74
End: 2019-03-04

## 2019-03-04 LAB
BACTERIA BLD CULT: NORMAL
BACTERIA BLD CULT: NORMAL

## 2019-03-08 ENCOUNTER — PATIENT OUTREACH (OUTPATIENT)
Dept: OTHER | Facility: HOSPITAL | Age: 74
End: 2019-03-08

## 2019-03-08 DIAGNOSIS — Z71.89 COMPLEX CARE COORDINATION: Primary | ICD-10-CM

## 2019-03-11 ENCOUNTER — OFFICE VISIT (OUTPATIENT)
Dept: FAMILY MEDICINE CLINIC | Facility: HOSPITAL | Age: 74
End: 2019-03-11

## 2019-03-11 VITALS
TEMPERATURE: 97.8 F | HEIGHT: 71 IN | DIASTOLIC BLOOD PRESSURE: 60 MMHG | BODY MASS INDEX: 23.8 KG/M2 | SYSTOLIC BLOOD PRESSURE: 120 MMHG | WEIGHT: 170 LBS | HEART RATE: 91 BPM

## 2019-03-11 DIAGNOSIS — E87.2 LACTIC ACIDOSIS: ICD-10-CM

## 2019-03-11 DIAGNOSIS — E87.1 HYPONATREMIA: ICD-10-CM

## 2019-03-11 DIAGNOSIS — D72.828 OTHER ELEVATED WHITE BLOOD CELL (WBC) COUNT: ICD-10-CM

## 2019-03-11 DIAGNOSIS — M17.12 PRIMARY LOCALIZED OSTEOARTHRITIS OF LEFT KNEE: ICD-10-CM

## 2019-03-11 DIAGNOSIS — Z09 HOSPITAL DISCHARGE FOLLOW-UP: Primary | ICD-10-CM

## 2019-03-11 DIAGNOSIS — N17.9 AKI (ACUTE KIDNEY INJURY) (HCC): ICD-10-CM

## 2019-03-11 PROCEDURE — 99495 TRANSJ CARE MGMT MOD F2F 14D: CPT | Performed by: INTERNAL MEDICINE

## 2019-03-11 PROCEDURE — 1111F DSCHRG MED/CURRENT MED MERGE: CPT | Performed by: INTERNAL MEDICINE

## 2019-03-11 NOTE — PROGRESS NOTES
Assessment/Plan:    EROS (acute kidney injury) ([facility] [address] )  Improved with holding Lasix/Spironolactone/ARB, renal function back to normal by discharge    Leukocytosis  Neg BC and CT C/A/P showed no acute infection, off abx, pt afebrile and Wbc ct was nml upon discharge    Lactic acidosis  Resolved with fluids, pt afebrile and feeling better    Hyponatremia  Resolved by discharge       Diagnoses and all orders for this visit:    Hospital discharge follow-up    EROS (acute kidney injury) ([facility] [address] )    Other elevated white blood cell (WBC) count    Lactic acidosis    Hyponatremia    Primary localized osteoarthritis of left knee  Comments:  s/p L TKR - doing well and going to be starting OP PT, con't f/u with Ortho  Orders:  -     Ambulatory referral to Internal Medicine          Subjective:      Patient ID: Trey Kelly is a 68 y o  male  HPI Pt here for Hospital follow up appt  Pt was admitted to [facility] from 2/27/19 to 3/2/19  Records were reviewed by myself and events are summarized below  TCM Call (since 2/8/2019)     Date and time call was made  2/15/2019  3:34 PM    Hospital care reviewed  Records reviewed    Patient was hospitialized at  [address] [facility]    Date of Admission  02/27/19    Date of discharge  03/02/19    Diagnosis  EROS     Disposition  Home    Were the patients medications reviewed and updated  No    Current Symptoms  None      TCM Call (since 2/8/2019)     Post hospital issues  None    Should patient be enrolled in anticoag monitoring? No    Scheduled for follow up? Yes    Not clinically warranted  Patient already seen for TCM in the last 30 days    Did you obtain your prescribed medications  Yes    Do you need help managing your prescriptions or medications  No    Is transportation to your appointment needed  No    I have advised the patient to call PCP with any new or worsening symptoms  Socorro Mcdaniels MA        Pt presented to [facility] ER on 2/27/19 with c/o SOB    In the ED HR was 105 and resp were 22  O2 sat 97% on RA  BUN/Cr elevated at 42/1 74, Glu 231, Na 132  WBC ct up at 23 48 with increase in neutrophils, H/H 12 9/39 9 and plt up at 484  UA was negative  Lactate was 3 0  BNP was 416  CXR showed no acute dz  Pt was admitted with EROS and leukocytosis  His ARB/lasix/spironolactone were held  BC X2 were drawn and pt was started on empiric Vanco d/t the leukocytosis  ID was consulted as was ortho d/t his recent TKR  Cardio was consulted for SOB  CT C/A/P showed proctocolitis and nonspecific perinephric stranding  BC x 2 were negative  Abx were stopped  WBC trended down  SOB continued and V/Q scan was done - intermediate/indeterminate risk was noted  CT PE protocal was done once pts BUN/Cr returned to normal and no PE was noted, only mild pulm vascular congestion was noted  A1C was 5 7  ESR elevated at 32 and CRP 20 9  Procalcitoninn was 0 48  BUN/Cr back to normal   Pt improved and was discharged home on 3/2/19  Pt has been doing well since discharge  He was restarted on his ARB and told not to take his lasix or spironolactone upon discharge  He notes his SOB restarted and he stopped the Irbestartan and his SOB went away again  He has checked his BP at home and it was high in the am at the 150's but has also been 130's  He is eating and drinking well and he states his appetite is back  He is down 19 lbs from Oct 2018  He denies any pain/difficulty swallowing/abd pain/blood in stool/C/D  He notes no F/C  He currently denies SOB/orthopnea/PND/CP/palp  He denies any LE edema other then swelling at L knee  He has to call and restart OP PT for his recent L TKR  He states his flexibility is getting better but he still has pain - taty at night  He checked his sugar and this am it was 100  He states his average is about 150  He notes no recent low reading  He has appt to    Review of Systems   Constitutional: Positive for fatigue and unexpected weight change   Negative for chills and fever  HENT: Negative for congestion and sinus pain  Eyes: Negative for pain and redness  Respiratory: Negative for cough, chest tightness and shortness of breath  Cardiovascular: Negative for chest pain, palpitations and leg swelling  Gastrointestinal: Negative for abdominal pain, blood in stool, constipation, diarrhea, nausea and vomiting  Endocrine: Negative for polydipsia and polyuria  Genitourinary: Negative for difficulty urinating and dysuria  Musculoskeletal: Positive for arthralgias and joint swelling  Negative for myalgias  Skin: Negative for rash and wound  Neurological: Negative for dizziness and headaches  Hematological: Does not bruise/bleed easily  Psychiatric/Behavioral: Negative for behavioral problems and confusion  Objective:    /60 (BP Location: Right arm, Patient Position: Sitting, Cuff Size: Standard)   Pulse 91   Temp 97 8 °F (36 6 °C)   Ht 5' 11" (1 803 m)   Wt 77 1 kg (170 lb)   BMI 23 71 kg/m²      Physical Exam   Constitutional: He appears well-developed and well-nourished  No distress  HENT:   Head: Normocephalic and atraumatic  Mouth/Throat: No oropharyngeal exudate  Eyes: Conjunctivae are normal  Right eye exhibits no discharge  Left eye exhibits no discharge  Neck: Neck supple  No tracheal deviation present  Cardiovascular: Normal rate and regular rhythm  No murmur heard  Pulmonary/Chest: Effort normal and breath sounds normal  No respiratory distress  He has no wheezes  He has no rales  Abdominal: Soft  He exhibits no distension  There is no tenderness  Musculoskeletal:   Ambulates with limp, + swelling L knee   Lymphadenopathy:     He has no cervical adenopathy  Neurological: He is alert  He exhibits normal muscle tone  Skin: Skin is warm and dry  No rash noted  Psychiatric: He has a normal mood and affect  His behavior is normal    Nursing note and vitals reviewed

## 2019-03-11 NOTE — PROGRESS NOTES
Assessment/Plan:    No problem-specific Assessment & Plan notes found for this encounter  Diagnoses and all orders for this visit:    Hospital discharge follow-up    EROS (acute kidney injury) ([address] )    Other elevated white blood cell (WBC) count    Lactic acidosis    Hyponatremia    Primary localized osteoarthritis of left knee  -     Ambulatory referral to Internal Medicine          Subjective:      Patient ID: Hetal Hastings is a 68 y o  male  HPI Pt here for TCM/Hospital follow up  Pt was admitted to [address] [place] [address] from 2/27/19 to 3/2/19  Hospital records were reviewed by myself in detail and events are summarized below  TCM Call (since 2/8/2019)     Date and time call was made  2/15/2019  3:34 PM    Hospital care reviewed  Records reviewed    Patient was hospitialized at  [facility]    Date of Admission  02/27/19    Date of discharge  03/02/19    Diagnosis  EROS     Disposition  Home    Were the patients medications reviewed and updated  No    Current Symptoms  None      TCM Call (since 2/8/2019)     Post hospital issues  None    Should patient be enrolled in anticoag monitoring? No    Scheduled for follow up? Yes    Not clinically warranted  Patient already seen for TCM in the last 30 days    Did you obtain your prescribed medications  Yes    Do you need help managing your prescriptions or medications  No    Is transportation to your appointment needed  No    I have advised the patient to call PCP with any new or worsening symptoms  Benjamin Barrios MA        Pt presented to [address] [place] [address] ER on 2/27/19 with c/o SOB  In the ED HR was 105 and resp were 22  O2 sat 97% on RA  BUN/Cr elevated at 42/1 74, Glu 231, Na 132  WBC ct up at 23 48 with increase in neutrophils, H/H 12 9/39 9 and plt up at 484  UA was negative  Lactate was 3 0  BNP was 416  CXR showed no acute dz  Pt was admitted with EROS and leukocytosis  His ARB/lasix/spironolactone were held    BC X2 were drawn and pt was started on empiric Vanco d/t the leukocytosis  ID was consulted as was ortho d/t his recent TKR  Cardio was consulted for SOB  CT C/A/P showed proctocolitis and nonspecific perinephric stranding  BC x 2 were negative  Abx were stopped  WBC trended down  SOB continued and V/Q scan was done - intermediate/indeterminate risk was noted  CT PE protocal was done once pts BUN/Cr returned to normal and no PE was noted, only mild pulm vascular congestion was noted  A1C was 5 7  ESR elevated at 32 and CRP 20 9  Procalcitoninn was 0 48  BUN/Cr back to normal   Pt improved and was discharged home on 3/2/19  Pt has been doing well since discharge  He was restarted on his ARB and told not to take his lasix or spironolactone upon discharge  He notes his SOB restarted and he stopped the Irbestartan and his SOB went away again  He has checked his BP at home and it was high in the am at the 150's but has also been 130's  He is eating and drinking well and he states his appetite is back  He is down 19 lbs from Oct 2018  He denies any pain/difficulty swallowing/abd pain/blood in stool/C/D  He notes no F/C  He currently denies SOB/orthopnea/PND/CP/palp  He denies any LE edema other then swelling at L knee  He has to call and restart OP PT for his recent L TKR  He states his flexibility is getting better but he still has pain - taty at night  He checked his sugar and this am it was 100  He states his average is about 150  He notes no recent low reading  He has appt to establish with [facility] later this month - appt is with Dr Sarahi Pino  Review of Systems   Constitutional: Positive for fatigue and unexpected weight change  Negative for chills and fever  HENT: Negative for congestion and sinus pain  Eyes: Negative for pain and redness  Respiratory: Negative for cough, chest tightness and shortness of breath  Cardiovascular: Negative for chest pain, palpitations and leg swelling     Gastrointestinal: Negative for abdominal pain, constipation, diarrhea, nausea and vomiting  Endocrine: Negative for polydipsia and polyuria  Genitourinary: Negative for difficulty urinating and dysuria  Musculoskeletal: Positive for arthralgias  Negative for myalgias  Skin: Negative for rash and wound  Neurological: Negative for dizziness and headaches  Hematological: Does not bruise/bleed easily  Psychiatric/Behavioral: Negative for behavioral problems and confusion  Objective:    /60 (BP Location: Right arm, Patient Position: Sitting, Cuff Size: Standard)   Pulse 91   Temp 97 8 °F (36 6 °C)   Ht 5' 11" (1 803 m)   Wt 77 1 kg (170 lb)   BMI 23 71 kg/m²      Physical Exam   Constitutional: He appears well-developed and well-nourished  No distress  HENT:   Head: Normocephalic and atraumatic  Mouth/Throat: No oropharyngeal exudate  Eyes: Conjunctivae are normal  Right eye exhibits no discharge  Left eye exhibits no discharge  Neck: Neck supple  No tracheal deviation present  Cardiovascular: Normal rate and regular rhythm  No murmur heard  Pulmonary/Chest: Effort normal and breath sounds normal  No respiratory distress  He has no wheezes  He has no rales  Abdominal: Soft  There is no tenderness  There is no rebound and no guarding  Musculoskeletal: He exhibits no deformity  Lymphadenopathy:     He has no cervical adenopathy  Neurological: He is alert  He exhibits normal muscle tone  Skin: Skin is warm and dry  No rash noted  Psychiatric: He has a normal mood and affect  His behavior is normal    Nursing note and vitals reviewed

## 2019-03-11 NOTE — ASSESSMENT & PLAN NOTE
Neg BC and CT C/A/P showed no acute infection, off abx, pt afebrile and Wbc ct was nml upon discharge

## 2019-03-12 ENCOUNTER — PATIENT OUTREACH (OUTPATIENT)
Dept: FAMILY MEDICINE CLINIC | Facility: HOSPITAL | Age: 74
End: 2019-03-12

## 2019-03-12 LAB
EST. AVERAGE GLUCOSE BLD GHB EST-MCNC: 117 MG/DL
GLUCOSE SERPL-MCNC: 139 MG/DL (ref 65–99)
HBA1C MFR BLD: 5.7 % (ref 4.8–5.6)

## 2019-03-12 NOTE — LETTER
Date: 03/19/19    Dear Miek Sandoval,   My name is Marc Rand  I am a registered nurse care manager working with [employer] office  I have not been able to reach you and would like to set a time that I can talk with you over the phone  My work is to help patients that have complex medical conditions get the care they need  This includes patients who may have been in the hospital or emergency room  I have enclosed my business card for you  Please call me with any questions you may have  I look forward to speaking with you    Sincerely,  [address] [place] [address]  [phone]    Copy:  (primary care physician name and address)

## 2019-03-12 NOTE — PROGRESS NOTES
Outpatient Care Management Note:    Voice mail message left, with my contact information, introducing myself and requesting a call back  Care manager called Randalyn Cabot and left a voice mail message, with my contact information, introducing myself and requesting a call back  Voice mail message left, with my contact information, introducing myself and requesting a call back   (3rd attempt)  Unable to reach letter mailed to patient

## 2019-03-13 ENCOUNTER — APPOINTMENT (OUTPATIENT)
Dept: RADIOLOGY | Facility: CLINIC | Age: 74
End: 2019-03-13
Payer: COMMERCIAL

## 2019-03-13 ENCOUNTER — OFFICE VISIT (OUTPATIENT)
Dept: OBGYN CLINIC | Facility: CLINIC | Age: 74
End: 2019-03-13

## 2019-03-13 VITALS
HEART RATE: 82 BPM | WEIGHT: 172 LBS | HEIGHT: 71 IN | DIASTOLIC BLOOD PRESSURE: 78 MMHG | BODY MASS INDEX: 24.08 KG/M2 | SYSTOLIC BLOOD PRESSURE: 138 MMHG

## 2019-03-13 DIAGNOSIS — Z47.1 AFTERCARE FOLLOWING LEFT KNEE JOINT REPLACEMENT SURGERY: Primary | ICD-10-CM

## 2019-03-13 DIAGNOSIS — Z96.652 AFTERCARE FOLLOWING LEFT KNEE JOINT REPLACEMENT SURGERY: ICD-10-CM

## 2019-03-13 DIAGNOSIS — Z96.652 AFTERCARE FOLLOWING LEFT KNEE JOINT REPLACEMENT SURGERY: Primary | ICD-10-CM

## 2019-03-13 DIAGNOSIS — Z47.1 AFTERCARE FOLLOWING LEFT KNEE JOINT REPLACEMENT SURGERY: ICD-10-CM

## 2019-03-13 PROBLEM — M70.52 PES ANSERINUS BURSITIS OF LEFT KNEE: Status: ACTIVE | Noted: 2019-03-13

## 2019-03-13 PROCEDURE — 99024 POSTOP FOLLOW-UP VISIT: CPT | Performed by: ORTHOPAEDIC SURGERY

## 2019-03-13 PROCEDURE — 73562 X-RAY EXAM OF KNEE 3: CPT

## 2019-03-13 NOTE — PROGRESS NOTES
Assessment:     1  Aftercare following left knee joint replacement surgery        Plan:  The patient was seen and examined by Dr Ibrahima Mccarthy and myself  Problem List Items Addressed This Visit        Other    Aftercare following left knee joint replacement surgery - Primary     Patient is doing well status post left total knee arthroplasty on January 28, 2019  X-rays were reviewed with the patient  He will start outpatient physical therapy next week  Discussed importance of doing the exercises at home as well  Continue ice and elevation  Follow up in 8 weeks with repeat x-rays  All questions were answered to patient's satisfaction  Relevant Orders    XR knee 3 vw left non injury         Subjective:     Patient ID: Katina Cuellar is a 68 y o  male  Chief Complaint: This is a 71-year-old white male who is status post left total knee arthroplasty on January 28, 2019  He was recently admitted to the hospital for medical issues and is doing better  He starts outpatient physical therapy next week  He states he continues to have pain in his left knee but is slowly improving  He takes Tylenol as needed for pain  He is ambulating with no assistance      Allergy:  Allergies   Allergen Reactions    Ceftin [Cefuroxime] Itching     Medications:  all current active meds have been reviewed  Past Medical History:  Past Medical History:   Diagnosis Date    Atrial fibrillation ([facility] [address] )     Cancer ([facility] [address] )     Colitis     Fatty liver     History of chemotherapy     History of radiation therapy     History of shingles 2018    Pneumonia      Past Surgical History:  Past Surgical History:   Procedure Laterality Date    AORTIC VALVE REPLACEMENT      COLECTOMY      COLONOSCOPY      DENTAL SURGERY      KNEE SURGERY      LYMPHADENECTOMY      OTHER SURGICAL HISTORY      MAZE PROCEDURE    MO TOTAL KNEE ARTHROPLASTY Left 1/28/2019    Procedure: ARTHROPLASTY LEFT KNEE TOTAL;  Surgeon: Ana Lilia Jerome MD;  Location: [facility];  Service: Orthopedics     Family History:  Family History   Problem Relation Age of Onset    Heart block Family     Coronary artery disease Mother     Thrombosis Mother     Diabetes Father     Hypertension Father     Colon cancer Paternal Grandfather      Social History:  Social History     Substance and Sexual Activity   Alcohol Use Yes    Frequency: Monthly or less    Drinks per session: 1 or 2    Binge frequency: Never    Comment: has not drank in over a month     Social History     Substance and Sexual Activity   Drug Use No     Social History     Tobacco Use   Smoking Status Former Smoker    Packs/day: 1 00    Years: 40 00    Pack years: 40 00    Types: Cigarettes    Last attempt to quit: 2009    Years since quitting: 10 2   Smokeless Tobacco Never Used     Review of Systems   Constitutional: Negative  HENT: Negative  Eyes: Negative  Respiratory: Negative  Cardiovascular: Negative  Gastrointestinal: Negative  Endocrine: Negative  Genitourinary: Negative  Musculoskeletal: Positive for arthralgias and joint swelling  Skin: Negative  Allergic/Immunologic: Negative  Hematological: Negative  Psychiatric/Behavioral: Negative  Objective:  BP Readings from Last 1 Encounters:   03/13/19 138/78      Wt Readings from Last 1 Encounters:   03/13/19 78 kg (172 lb)      BMI:   Estimated body mass index is 23 99 kg/m² as calculated from the following:    Height as of this encounter: 5' 11" (1 803 m)  Weight as of this encounter: 78 kg (172 lb)  BSA:   Estimated body surface area is 1 98 meters squared as calculated from the following:    Height as of this encounter: 5' 11" (1 803 m)  Weight as of this encounter: 78 kg (172 lb)  Physical Exam   Constitutional: He is oriented to person, place, and time  He appears well-developed  HENT:   Head: Normocephalic and atraumatic  Eyes: Conjunctivae and EOM are normal    Neck: Neck supple  Neurological: He is alert and oriented to person, place, and time  Skin: Skin is warm  Psychiatric: He has a normal mood and affect  Nursing note and vitals reviewed  Left Knee Exam     Range of Motion   Extension: -5   Flexion: 100     Tests   Varus: negative Valgus: negative    Other   Erythema: absent  Scars: present (Healed incision with no evidence of infection)  Sensation: normal  Pulse: present  Swelling: mild    Comments:  Thigh and calf soft, nontender  Negative Homans            I have personally reviewed pertinent films in PACS  X-rays left knee reveal a well-aligned prosthesis with no evidence of loosening

## 2019-03-13 NOTE — ASSESSMENT & PLAN NOTE
Patient is doing well status post left total knee arthroplasty on January 28, 2019  X-rays were reviewed with the patient  He will start outpatient physical therapy next week  Discussed importance of doing the exercises at home as well  Continue ice and elevation  Follow up in 8 weeks with repeat x-rays  All questions were answered to patient's satisfaction

## 2019-03-13 NOTE — ASSESSMENT & PLAN NOTE
Findings consistent with left knee pes bursitis  Findings and treatment options were discussed with the patient  Repeat cortisone injection was given to the left pes bursa today  She tolerated the procedure well  Advised to apply cold compress today  Continue physical therapy  Hamstring stretches were demonstrated for the patient the office today that she has to do on a regular basis  Follow-up as needed if symptoms return  All questions were answered to patient's satisfaction  Plan discussed with Dr Josey Douglass

## 2019-03-18 ENCOUNTER — EVALUATION (OUTPATIENT)
Dept: PHYSICAL THERAPY | Facility: CLINIC | Age: 74
End: 2019-03-18
Payer: COMMERCIAL

## 2019-03-18 VITALS — HEART RATE: 77 BPM | DIASTOLIC BLOOD PRESSURE: 82 MMHG | SYSTOLIC BLOOD PRESSURE: 154 MMHG

## 2019-03-18 DIAGNOSIS — Z47.1 AFTERCARE FOLLOWING LEFT KNEE JOINT REPLACEMENT SURGERY: Primary | ICD-10-CM

## 2019-03-18 DIAGNOSIS — Z96.652 AFTERCARE FOLLOWING LEFT KNEE JOINT REPLACEMENT SURGERY: Primary | ICD-10-CM

## 2019-03-18 PROCEDURE — 97161 PT EVAL LOW COMPLEX 20 MIN: CPT

## 2019-03-18 PROCEDURE — 97110 THERAPEUTIC EXERCISES: CPT | Performed by: PHYSICAL THERAPIST

## 2019-03-18 NOTE — PROGRESS NOTES
PT Evaluation     Today's date: 3/18/2019  Patient name: Jaelyn Herring  : 1945  MRN: [id number]  Referring provider: Bennett Ibrahim PA-C  Dx:   Encounter Diagnosis     ICD-10-CM    1  Aftercare following left knee joint replacement surgery Z47 1 Ambulatory referral to Physical Therapy    [id number]                   Assessment  Assessment details: Patient is a 68 y o  male presenting to outpatient PT with complaints of (L) knee pain and weakness  Patient describes having chronic knee pain with recent difficulty ambulating since his surgery on 19 due to multiple hospital admissions limiting his rehab  Patient displays with decreased (L) knee ROM, decreased LLE strength, decreased patellar mobility, increased swelling, balance and gait deficits, and functional restrictions with pathanatomical signs and symptoms consistent with s/p (L) TKR  Skilled PT is required to increase (L) knee ROM, strength, and functional mobility to allow patient to return to desired level of function with all activities  No further referral appears necessary at this time based upon examination results  Thank you very much for your referral!  Impairments: abnormal gait, abnormal muscle tone, abnormal or restricted ROM, abnormal movement, activity intolerance, impaired balance, impaired physical strength, lacks appropriate home exercise program, pain with function, weight-bearing intolerance and poor body mechanics  Other impairment: (L) knee edema    Symptom irritability: moderateUnderstanding of Dx/Px/POC: good   Prognosis: good    Goals  ST  Decrease pain to 3/10 max in 2 weeks  2  Increase (L) knee AROM: 110 deg flexion, -5 deg extension in 2 weeks  3  Increase LLE strength by 1/2 MMT grade in 2 weeks  4  Improve SL balance to 10 seconds in 2 weeks  5  Decrease (L) knee swelling 1 cm in 2 weeks  6  I with HEP in 2 weeks  7  Improve FOTO score to 58 in 2 weeks  LT  Decrease pain to 1/10 max in 4 weeks    2  Increase (L) knee AROM: 120 deg flexion, 0 deg extension in 4 weeks  3  Increase LLE strength to 4+/5 all planes in 4 weeks  4  Improve SL balance to 20 seconds in 4 weeks  5  Decrease (L) knee swelling 2 cm in 4 weeks  6  I with HEP in 4 weeks  7  Improve FOTO score to 65 in 4 weeks  Plan  Patient would benefit from: skilled physical therapy  Referral necessary: No  Planned modality interventions: cryotherapy and thermotherapy: hydrocollator packs  Planned therapy interventions: joint mobilization, manual therapy, neuromuscular re-education, patient education, strengthening, stretching, therapeutic activities, therapeutic exercise, home exercise program, body mechanics training, activity modification, functional ROM exercises, gait training, balance and flexibility  Frequency: 2x week  Duration in visits: 8  Duration in weeks: 4  Plan of Care beginning date: 3/18/2019  Plan of Care expiration date: 4/15/2019  Treatment plan discussed with: patient        Subjective Evaluation    History of Present Illness  Date of surgery: 2019  Mechanism of injury: surgery  Mechanism of injury: Patient reports having chronic (L) knee pain and arthritis leading him to TKR surgery on 19  Since surgery, patient is having pain with sleeping and difficulty ambulating longer distances up to a mile  He has been in and out of the hospital since surgery due to chest pains, difficulty breathing, and medication issues which have recently resolved  He was getting home therapy shortly after surgery and has been doing the HEP he was given  He is starting to have (R) knee pain as well            Not a recurrent problem   Quality of life: fair    Pain  Current pain ratin  At best pain ratin  At worst pain ratin  Location: inferior knee  Quality: dull ache and sharp  Relieving factors: ice and rest  Aggravating factors: stair climbing, walking and standing  Progression: improved    Social Support  Stairs in house: yes Lives in: multiple-level home    Employment status: working (administrative work)    Diagnostic Tests  X-ray: normal    FCE comments: Intact hardwareTreatments  Previous treatment: physical therapy and injection treatment  Current treatment: physical therapy  Discharged from (in last 30 days): home health care  Patient Goals  Patient goals for therapy: increased motion, decreased pain, increased strength, improved balance and return to sport/leisure activities  Patient goal: decreased difficulty with walking and biking         Objective     Static Posture   General Observations  Asymmetrical weight bearing  Observations   Left Knee   Positive for edema  Additional Observation Details  (L) knee increased redness, swelling, pitting edema  Scar healing well with increased tightness  Decreased patellar mobility superior/inferior  Tenderness     Additional Tenderness Details  TTP along (L) inferior knee joint line, lateral joint line     Active Range of Motion   Left Knee   Flexion: 98 degrees   Extension: -12 degrees     Right Knee   Flexion: 123 degrees   Extension: -7 degrees     Passive Range of Motion   Left Knee   Flexion: 107 degrees   Extension: -8 degrees     Right Knee   Extension: -6 degrees     Strength/Myotome Testing     Left Hip   Planes of Motion   Flexion: 4  Extension: 4+  Abduction: 4+    Right Hip   Planes of Motion   Flexion: 5  Extension: 4+  Abduction: 4+    Left Knee   Flexion: 4-  Extension: 4    Right Knee   Flexion: 5  Extension: 5    Left Ankle/Foot   Dorsiflexion: 4+  Plantar flexion: 4+    Right Ankle/Foot   Dorsiflexion: 5  Plantar flexion: 5    Additional Strength Details  Hip extension performed in supine  Hip abduction performed in hooklying  Swelling     Left Knee Girth Measurement (cm)   Joint line: 38 5 cm    Right Knee Girth Measurement (cm)   Joint line: 34 cm    Ambulation     Observational Gait   Gait: antalgic   Decreased walking speed       Additional Observational Gait Details  Decreased gait speed, antalgic on (L), increased stance time on (L)     Functional Assessment        Single Leg Stance   Left: 3 seconds  Right: 12 seconds          Daily Treatment Diary     DX: s/p (L) TKR  EPOC: 4/15/19  Precautions: Fall risk  CO-MORBIDITES: Afib, DMII, CHF  PERSONAL FACTORS: None    Manual           (L) knee PROM          (L) knee mobs          Patellar mobs          IASTM                        Exercise Diary  HEP         Recumbent bike                     HS stretch w/ strap          Gastroc stretch w/ strap          Heel slides w/ strap 3/18         Supine knee ext stretch 3/18                   SLR          Bridges          PHE          S/L abd                    LAQs          Prone HS curls                    Mini squats          FWD lunge          LAT lunge          HR/TR          Marches          Wall sit           Leg press                    SLS          Sharpened Romberg              Modalities

## 2019-03-22 ENCOUNTER — OFFICE VISIT (OUTPATIENT)
Dept: PHYSICAL THERAPY | Facility: CLINIC | Age: 74
End: 2019-03-22
Payer: COMMERCIAL

## 2019-03-22 DIAGNOSIS — Z47.1 AFTERCARE FOLLOWING LEFT KNEE JOINT REPLACEMENT SURGERY: Primary | ICD-10-CM

## 2019-03-22 DIAGNOSIS — Z96.652 AFTERCARE FOLLOWING LEFT KNEE JOINT REPLACEMENT SURGERY: Primary | ICD-10-CM

## 2019-03-22 PROCEDURE — 97140 MANUAL THERAPY 1/> REGIONS: CPT | Performed by: PHYSICAL THERAPIST

## 2019-03-22 PROCEDURE — 97110 THERAPEUTIC EXERCISES: CPT | Performed by: PHYSICAL THERAPIST

## 2019-03-22 NOTE — PROGRESS NOTES
Daily Note     Today's date: 3/22/2019  Patient name: Sarahy Villela  : 1945  MRN: [id number]  Referring provider: Melissa Asher PA-C  Dx:   Encounter Diagnosis     ICD-10-CM    1  Aftercare following left knee joint replacement surgery Z47 1     [id number]                   Subjective: Patient reports the exercises have been going well  He has good days and bad days but is able to do the exercises on most days with limited pain  He feels the most stiff in the morning  Objective: See treatment diary below      Assessment: Tolerated treatment well  Reviewed HEP with patient and made adjustments as needed  Scar mobilization was added to HEP and patient educated on importance of scar mobility with patellar mobilizations  Patient able to perform hip strengthening TE's with good form  When exercises targeted at the knee he has difficulty with knee flexion ROM  Continue to increase AROM and strength training as patient is able  Plan: Continue per plan of care       Daily Treatment Diary    DX: s/p (L) TKR  EPOC: 4/15/19  Precautions: Fall risk  CO-MORBIDITES: Afib, DMII, CHF  PERSONAL FACTORS: None    Manual  3/22         (L) knee PROM 5'         HS stretch 2'         Patellar mobs 2'         IASTM          Scar massage 1'             Exercise Diary  HEP 3/22        Recumbent bike   5'                  HS stretch w/ strap  30"x3        Gastroc stretch w/ strap  30"x3        Heel slides w/ strap 3/18         Supine knee ext stretch 3/18                   SLR  10        Bridges  10        PHE  10        S/L abd  10                  LAQs  10         Prone HS curls  10                   Mini squats  10        FWD lunge  10 ea        LAT lunge  10 ea        HR/TR  10        Marches  10 ea        HS curls  10 ea        Standing hip abd  10 ea        Standing hip ext  10 ea        Wall sit           Leg press                    SLS          Sharpened Romberg              Modalities

## 2019-03-25 ENCOUNTER — OFFICE VISIT (OUTPATIENT)
Dept: PHYSICAL THERAPY | Facility: CLINIC | Age: 74
End: 2019-03-25
Payer: COMMERCIAL

## 2019-03-25 DIAGNOSIS — Z47.1 AFTERCARE FOLLOWING LEFT KNEE JOINT REPLACEMENT SURGERY: Primary | ICD-10-CM

## 2019-03-25 DIAGNOSIS — Z96.652 AFTERCARE FOLLOWING LEFT KNEE JOINT REPLACEMENT SURGERY: Primary | ICD-10-CM

## 2019-03-25 PROCEDURE — 97140 MANUAL THERAPY 1/> REGIONS: CPT

## 2019-03-25 PROCEDURE — 97110 THERAPEUTIC EXERCISES: CPT

## 2019-03-25 NOTE — PROGRESS NOTES
Daily Note     Today's date: 3/25/2019  Patient name: Katina Cuellar  : 1945  MRN: [id number]  Referring provider: Calista Zepeda PA-C  Dx:   Encounter Diagnosis     ICD-10-CM    1  Aftercare following left knee joint replacement surgery Z47 1     [id number]                   Subjective: Patient reports he has been doing well  "Knee is feeling better little by little "    Objective: See treatment diary below      Assessment: Tolerated treatment well  Completed documented program  Pt  Able to achieve full rotations on recumbent bike today for first time  PROM continues to improve, needs continued work in both flexion and extension  Extension improved significantly post manual therapy  Will continue to progress stretching and TE's as documented below upon nv  Plan: Continue per plan of care       Daily Treatment Diary    DX: s/p (L) TKR  EPOC: 4/15/19  Precautions: Fall risk  CO-MORBIDITES: Afib, DMII, CHF  PERSONAL FACTORS: None    Manual  3/22 3/25        (L) knee PROM 5' 8'        HS stretch 2'         Patellar mobs 2' 2'        IASTM          Scar massage 1'             Exercise Diary  HEP 3/22 3/25       Recumbent bike   5' 5'  Full                  HS stretch w/ strap  30"x3 30"x3       Gastroc stretch w/ strap  30"x3 15"x5       Heel slides w/ strap 3/18  5"x20       Supine knee ext stretch 3/18  5"x10                 SLR  10 10       Bridges  10 10       PHE  10 10       S/L abd  10 10                 LAQs  10  5"x10       Prone HS curls  10  10       Prone Quad set   nv                 Knee flexion on step   nv       Mini squats  10 10       FWD lunge  10 ea 10 ea       LAT lunge  10 ea 10 ea       HR/TR  10 10 ea       Marches  10 ea 10 ea       HS curls  10 ea 10 ea       Standing hip abd  10 ea 10 ea       Standing hip ext  10 ea 10 ea       Wall sit           Leg press                    SLS   15"x3       Sharpened Romberg              Modalities

## 2019-03-29 ENCOUNTER — OFFICE VISIT (OUTPATIENT)
Dept: PHYSICAL THERAPY | Facility: CLINIC | Age: 74
End: 2019-03-29
Payer: COMMERCIAL

## 2019-03-29 ENCOUNTER — CONSULT (OUTPATIENT)
Dept: CARDIOLOGY CLINIC | Facility: CLINIC | Age: 74
End: 2019-03-29
Payer: COMMERCIAL

## 2019-03-29 VITALS
DIASTOLIC BLOOD PRESSURE: 76 MMHG | SYSTOLIC BLOOD PRESSURE: 146 MMHG | WEIGHT: 170 LBS | HEIGHT: 71 IN | BODY MASS INDEX: 23.8 KG/M2 | HEART RATE: 88 BPM

## 2019-03-29 DIAGNOSIS — M17.12 PRIMARY LOCALIZED OSTEOARTHRITIS OF LEFT KNEE: ICD-10-CM

## 2019-03-29 DIAGNOSIS — I10 ESSENTIAL HYPERTENSION: ICD-10-CM

## 2019-03-29 DIAGNOSIS — Z96.652 AFTERCARE FOLLOWING LEFT KNEE JOINT REPLACEMENT SURGERY: Primary | ICD-10-CM

## 2019-03-29 DIAGNOSIS — Z47.1 AFTERCARE FOLLOWING LEFT KNEE JOINT REPLACEMENT SURGERY: Primary | ICD-10-CM

## 2019-03-29 DIAGNOSIS — I48.0 PAROXYSMAL ATRIAL FIBRILLATION (HCC): Primary | ICD-10-CM

## 2019-03-29 PROCEDURE — 97140 MANUAL THERAPY 1/> REGIONS: CPT

## 2019-03-29 PROCEDURE — 99214 OFFICE O/P EST MOD 30 MIN: CPT | Performed by: INTERNAL MEDICINE

## 2019-03-29 PROCEDURE — 97110 THERAPEUTIC EXERCISES: CPT

## 2019-03-29 PROCEDURE — 93000 ELECTROCARDIOGRAM COMPLETE: CPT | Performed by: INTERNAL MEDICINE

## 2019-03-29 RX ORDER — LISINOPRIL 10 MG/1
10 TABLET ORAL DAILY
Qty: 30 TABLET | Refills: 6 | Status: SHIPPED | OUTPATIENT
Start: 2019-03-29 | End: 2019-04-22 | Stop reason: SDUPTHER

## 2019-03-29 NOTE — PROGRESS NOTES
Cardiology Follow Up    Nikko Quinn  1945  [id number]  [address] [facility] Dorothy Miranda  [address] [place] [id number] [phone]    1  Paroxysmal atrial fibrillation (HCC)  POCT ECG       Interval History:     Followup for CAD, Bio AVR, PAF, HTN    Had knee replacment in January  Afterwards was having difficulty with dyspnea  Recent hospitalization  He stopped irbesartan on his own and he feels better  He now feels better  He no longer has significant shortness of breath, no chest pain and no other symptoms  He has a history of CABG X 1 CARLSON to LAD, Bioprosthetic AVR,  TALIA ligation and Maze procedure  He had history of atrial fibrillation before his surgery  He was briefly on amiodarone  He was eventually taken off anticoagulation  He has not  Had documented atrial fibrillation since then  Problem List     Basal cell carcinoma of skin    Coronary disease    Type 2 diabetes mellitus without complication, without long-term current use of insulin (HCC)    Lab Results   Component Value Date    HGBA1C 5 7 (H) 03/08/2019       No results for input(s): POCGLU in the last 72 hours      Blood Sugar Average: Last 72 hrs:          Dyslipidemia    Elevation of level of transaminase or lactic acid dehydrogenase (LDH)    Erectile dysfunction    Esophageal reflux    Fatty liver    Malignant lymphoma ([facility] )    Overview Signed 2/8/2018 11:17 AM by Patty Hammond DO     Description: small cell lymphocytic lymphoma         Malignant tumor of cecum ([facility] )    Multiple myeloma not having achieved remission ([facility] )    Primary localized osteoarthritis of right knee    Paroxysmal atrial fibrillation (HCC)    Hx of CABG    EROS (acute kidney injury) ([address] )    Colitis    Essential hypertension    S/P total knee arthroplasty, left    Overview Signed 1/28/2019  4:19 PM by DUNG Stein     -  S/P L TKA today  - Orthopedic surgery attending           Aftercare following left knee joint replacement surgery    Chronic diastolic congestive heart failure ([facility] )    History of aortic valve replacement with bioprosthetic valve (Chronic)    Leukocytosis    Dyspnea    Hyponatremia    Lactic acidosis        Past Medical History:   Diagnosis Date    [facility]     Cancer ([facility] )     Colitis     Fatty liver     History of chemotherapy     History of radiation therapy     History of shingles 2018    Pneumonia      Social History     Socioeconomic History    Marital status: /Civil Union     Spouse name: Not on file    Number of children: Not on file    Years of education: Not on file    Highest education level: Not on file   Occupational History    Occupation: WORKING FULLTIME    Social Needs    Financial resource strain: Not on file    Food insecurity:     Worry: Not on file     Inability: Not on file    Transportation needs:     Medical: Not on file     Non-medical: Not on file   Tobacco Use    Smoking status: Former Smoker     Packs/day: 1 00     Years: 40 00     Pack years: 40 00     Types: Cigarettes     Last attempt to quit: 2009     Years since quitting: 10 2    Smokeless tobacco: Never Used   Substance and Sexual Activity    Alcohol use: Yes     Frequency: Monthly or less     Drinks per session: 1 or 2     Binge frequency: Never     Comment: has not drank in over a month    Drug use: No    Sexual activity: Not on file   Lifestyle    Physical activity:     Days per week: Not on file     Minutes per session: Not on file    Stress: Not on file   Relationships    Social connections:     Talks on phone: Not on file     Gets together: Not on file     Attends Confucianist service: Not on file     Active member of club or organization: Not on file     Attends meetings of clubs or organizations: Not on file     Relationship status: Not on file    Intimate partner violence:     Fear of current or ex partner: Not on file     Emotionally abused: Not on file     Physically abused: Not on file     Forced sexual activity: Not on file   Other Topics Concern    Not on file   Social History Narrative    Not on file      Family History   Problem Relation Age of Onset    Heart block Family     Coronary artery disease Mother     Thrombosis Mother     Diabetes Father     Hypertension Father     Colon cancer Paternal Grandfather      Past Surgical History:   Procedure Laterality Date    AORTIC VALVE REPLACEMENT      COLECTOMY      COLONOSCOPY      DENTAL SURGERY      KNEE SURGERY      LYMPHADENECTOMY      OTHER SURGICAL HISTORY      MAZE PROCEDURE    NH TOTAL KNEE ARTHROPLASTY Left 1/28/2019    Procedure: ARTHROPLASTY LEFT KNEE TOTAL;  Surgeon: Deborah Calderón MD;  Location:  MAIN OR;  Service: Orthopedics       Current Outpatient Medications:     ascorbic acid (VITAMIN C) 500 mg tablet, Take 1 tablet (500 mg total) by mouth 2 (two) times a day (Patient taking differently: Take 500 mg by mouth daily ), Disp: 60 tablet, Rfl: 1    aspirin (HM ASPIRIN) 325 mg tablet, Take 1 tablet (325 mg total) by mouth 2 (two) times a day (Patient taking differently: Take 325 mg by mouth daily ), Disp: 60 tablet, Rfl: 0    metFORMIN (GLUCOPHAGE-XR) 750 mg 24 hr tablet, Take 1 tablet (750 mg total) by mouth daily with dinner for 30 days (Patient taking differently: Take 750 mg by mouth daily with breakfast ), Disp: 90 tablet, Rfl: 2    metoprolol succinate (TOPROL-XL) 100 mg 24 hr tablet, Take 100 mg by mouth daily , Disp: , Rfl:     Multiple Vitamin (MULTI-VITAMIN) tablet, Take 1 tablet by mouth daily, Disp: 30 tablet, Rfl: 1    Multiple Vitamins-Minerals (ICAPS AREDS 2) CAPS, Take 1 capsule by mouth daily, Disp: , Rfl:     omeprazole (PRILOSEC) 20 mg delayed release capsule, Take 1 capsule by mouth Daily, Disp: , Rfl:     pravastatin (PRAVACHOL) 40 mg tablet, take 1 tablet by mouth once daily at bedtime, Disp: 30 tablet, Rfl: 6    nitroglycerin (NITROSTAT) 0 3 mg SL tablet, Nitrostat 0 3 mg sublingual tablet  Place 1 tablet as needed by sublingual route as needed for 90 days  , Disp: , Rfl:   Allergies   Allergen Reactions    Ceftin [Cefuroxime] Itching     However, has tolerated Cefazolin and Pip-Tazo since, which have different side chains  Be cautious with / avoid 2nd, 3rd, or 4th Gen Cephs that have similar side chains to Cefuroxime  Labs:     Chemistry        Component Value Date/Time     05/19/2017 0720    K 3 3 (L) 03/02/2019 0454    K 4 4 01/08/2019 0854     03/02/2019 0454    CL 99 01/08/2019 0854    CO2 30 03/02/2019 0454    CO2 22 01/08/2019 0854    BUN 11 03/02/2019 0454    BUN 33 (H) 01/08/2019 0854    CREATININE 0 87 03/02/2019 0454    CREATININE 0 93 05/19/2017 0720        Component Value Date/Time    CALCIUM 8 6 03/02/2019 0454    CALCIUM 9 3 05/19/2017 0720    ALKPHOS 99 03/01/2019 0448    ALKPHOS 97 05/19/2017 0720    AST 14 03/01/2019 0448    AST 21 01/08/2019 0854    ALT 16 03/01/2019 0448    ALT 20 01/08/2019 0854    BILITOT 0 9 05/19/2017 0720            Lab Results   Component Value Date    CHOL 131 05/19/2017    CHOL 111 02/05/2016     Lab Results   Component Value Date    HDL 22 (L) 02/15/2019    HDL 31 (L) 05/18/2018    HDL 28 (L) 05/19/2017     Lab Results   Component Value Date    LDLCALC 44 02/15/2019    LDLCALC 72 05/19/2017    LDLCALC 62 02/05/2016     Lab Results   Component Value Date    TRIG 108 02/15/2019    TRIG 171 (H) 05/18/2018    TRIG 153 (H) 05/19/2017     Lab Results   Component Value Date    CHOLHDL 4 0 05/18/2018       Imaging: Ct Chest Abdomen Pelvis Wo Contrast    Result Date: 2/27/2019  Narrative: CT CHEST, ABDOMEN AND PELVIS WITHOUT IV CONTRAST INDICATION:   Chest pain  Shortness of breath  Constipation  Abnormal white blood cell count  Renal failure   COMPARISON:  February 14, 2019 TECHNIQUE: CT examination of the chest, abdomen and pelvis was performed without intravenous contrast   Axial, sagittal, and coronal 2D reformatted images were created from the source data and submitted for interpretation  Radiation dose length product (DLP) for this visit:  483 26 mGy-cm   This examination, like all CT scans performed in the [facility], was performed utilizing techniques to minimize radiation dose exposure, including the use of iterative  reconstruction and automated exposure control  Enteric contrast was not administered  FINDINGS: CHEST LUNGS:  Lungs are clear  There is no tracheal or endobronchial lesion  PLEURA:  Unremarkable  HEART/GREAT VESSELS:  Prosthetic aortic valve noted  Residual epicardial pacer wires noted  No thoracic aortic aneurysm  MEDIASTINUM AND TERRI:  Unremarkable  CHEST WALL AND LOWER NECK:   Unremarkable  ABDOMEN LIVER/BILIARY TREE:  There is a 12 mm cortical cyst in the medial left hepatic lobe  Liver is otherwise unremarkable  GALLBLADDER:  No calcified gallstones  No pericholecystic inflammatory change  SPLEEN:  Unremarkable  PANCREAS:  Unremarkable  ADRENAL GLANDS:  Unremarkable  KIDNEYS/URETERS:  Mild nonspecific bilateral perinephric stranding  There is a cortical cyst at the lower pole the left kidney  Renal vascular calcifications are noted  No hydronephrosis  No ureteral calculi  STOMACH AND BOWEL:  There is circumferential thickening of the wall the rectum with extensive perirectal inflammatory edema consistent with proctocolitis  There is a large duodenal diverticulum with layering debris  Right lower quadrant anastomotic staple line is noted  The remainder of stomach and bowel appear unremarkable  APPENDIX:  Surgically absent  ABDOMINOPELVIC CAVITY:  No ascites or free intraperitoneal air  No lymphadenopathy  VESSELS:  Unremarkable for patient's age  PELVIS REPRODUCTIVE ORGANS:  Unremarkable for patient's age  URINARY BLADDER:  Unremarkable   ABDOMINAL WALL/INGUINAL REGIONS:  Fat containing right inguinal hernia noted  OSSEOUS STRUCTURES:  No acute fracture or destructive osseous lesion  Corticated bilateral L5 pars interarticularis defects with grade 1 L5 relative to assess 1 anterolisthesis  Impression: Proctocolitis  No noncontrast CT abnormality to account for the patient's chest pain  Mild nonspecific perinephric stranding  No obstructive uropathy  Normal distention of the urinary bladder noted  Workstation performed: [id number]     X-ray Chest 2 Views    Result Date: 2/27/2019  Narrative: CHEST INDICATION:   chest pain  COMPARISON:  February 15, 2019 EXAM PERFORMED/VIEWS:  XR CHEST PA & LATERAL  The frontal view was performed utilizing dual energy radiographic technique  FINDINGS: Heart normal in size  Prior open heart surgery with aortic valve prosthesis  Pulmonary vessels unremarkable  The lungs are clear  No pneumothorax or pleural effusion  Osteoarthritis in both shoulders  Imaged bones otherwise unremarkable  Impression: No evidence of acute abnormality in the chest  Workstation performed: [id number]     Xr Knee 3 Vw Left Non Injury    Result Date: 3/18/2019  Narrative: LEFT KNEE INDICATION:  Follow up surgery  COMPARISON: 2/13/2019 VIEWS:  AP and lateral IMAGES:  3 FINDINGS: Total knee arthroplasty appears in satisfactory position  No evidence of hardware failure  There is no acute fracture or dislocation  There is no joint effusion  No lytic or blastic lesions are seen  Persistent circumferential swelling  Impression: Unremarkable appearance of total knee arthroplasty  Workstation performed: [id number]     Nm Lung Ventilation / Perfusion    Result Date: 3/1/2019  Narrative: VENTILATION AND PERFUSION SCAN INDICATION: R06 00: Dyspnea, unspecified R06 00: Dyspnea, unspecified R06 02: Shortness of breath COMPARISON:  CT 2/27/2019 TECHNIQUE:  Posterior ventilation imaging was performed after the inhalation of 15 7 mCi Xe-133 gas    Multiplanar perfusion imaging was next performed following the intravenous administration of 4 3 mCi Tc-99m labeled MAA  FINDINGS:  Ventilation imaging demonstrates normal distribution of radiopharmaceutical throughout both lungs  Perfusion imaging demonstrates very heterogeneous perfusion bilaterally  No typical discrete wedge-shaped perfusion defects are visualized  Impression: The probability for pulmonary embolus is intermediate/indeterminate  Workstation performed: [id number]     Cta Chest Pe Study    Result Date: 3/2/2019  Narrative: CTA - CHEST WITH IV CONTRAST - PULMONARY ANGIOGRAM INDICATION:   Chest pain, acute, PE suspected, intermed prob, positive D-dimer  COMPARISON: Numerous prior CT examinations and nuclear medicine examinations of the chest, most recent of which are dated March 1, 2019 and February 27, 2019  TECHNIQUE: CTA examination of the chest was performed using angiographic technique according to a protocol specifically tailored to evaluate for pulmonary embolism  Axial, sagittal, and coronal 2D reformatted images were created from the source data and  submitted for interpretation  In addition, coronal 3D MIP postprocessing was performed on the acquisition scanner  Radiation dose length product (DLP) for this visit:  376 06 mGy/cm  This examination, like all CT scans performed in the [facility], was performed utilizing techniques to minimize radiation dose exposure, including the use of iterative reconstruction and automated exposure control  IV Contrast:  85 mL Omnipaque 350  FINDINGS: PULMONARY ARTERIAL TREE:  No pulmonary embolus is seen  LUNGS:  The lungs are well aerated  There are vague groundglass infiltrates throughout the lungs bilaterally  PLEURA:  Small bilateral pleural effusions  HEART/GREAT VESSELS:  The heart is enlarged  Coronary artery calcifications  No evidence of a pericardial effusion  MEDIASTINUM AND TERRI:  Unremarkable  CHEST WALL AND LOWER NECK:   Unremarkable   VISUALIZED STRUCTURES IN THE UPPER ABDOMEN:  Subcentimeter low-density lesions in the liver, too small to accurately characterize, however likely represent cysts  The stomach is markedly distended and filled with recently ingested food products  There is a hiatal hernia  OSSEOUS STRUCTURES:  No acute fracture or destructive osseous lesion  Impression: No CT evidence of pulmonary emboli, similar to the numerous prior studies  Mild pulmonary vascular congestion  Workstation performed: [id number]       EKG: Normal Sinus Rhythm  LVH  Review of Systems   Constitution: Negative  HENT: Negative  Eyes: Negative  Cardiovascular: Negative  Respiratory: Negative  Endocrine: Negative  Hematologic/Lymphatic: Negative  Skin: Negative  Musculoskeletal: Negative  Gastrointestinal: Negative  Genitourinary: Negative  Neurological: Negative  Psychiatric/Behavioral: Negative  Allergic/Immunologic: Negative  Vitals:    03/29/19 0916   BP: 146/76   Pulse: 88           Physical Exam   Constitutional: He is oriented to person, place, and time  No distress  HENT:   Mouth/Throat: No oropharyngeal exudate  Eyes: No scleral icterus  Neck: No JVD present  Cardiovascular: Normal rate and regular rhythm  Exam reveals no gallop and no friction rub  No murmur heard  Pulmonary/Chest: Effort normal and breath sounds normal  No stridor  No respiratory distress  He has no wheezes  Abdominal: Soft  Bowel sounds are normal  He exhibits no distension  There is no tenderness  There is no guarding  Musculoskeletal: He exhibits no edema  Neurological: He is alert and oriented to person, place, and time  Skin: He is not diaphoretic  Psychiatric: He has a normal mood and affect  His behavior is normal        Discussion/Summary:    Coronary Artery Disease s/p LIMA To LAD: Continue with current medical therapy  LDL 44    HTN: Add lisinopril to his regimen  He checks his BP at home       S/p BIO AVR: normal function on his echocardiogram      Hx of PAF in the past and s/p MAZE and TALIA ligation  The patient was counseled regarding diagnostic results, instructions for management, risk factor reductions, impressions  total time of encounter was 25 minutes and 15 minutes was spent counseling

## 2019-04-01 ENCOUNTER — OFFICE VISIT (OUTPATIENT)
Dept: PHYSICAL THERAPY | Facility: CLINIC | Age: 74
End: 2019-04-01
Payer: COMMERCIAL

## 2019-04-01 DIAGNOSIS — Z47.1 AFTERCARE FOLLOWING LEFT KNEE JOINT REPLACEMENT SURGERY: Primary | ICD-10-CM

## 2019-04-01 DIAGNOSIS — Z96.652 AFTERCARE FOLLOWING LEFT KNEE JOINT REPLACEMENT SURGERY: Primary | ICD-10-CM

## 2019-04-01 PROCEDURE — 97140 MANUAL THERAPY 1/> REGIONS: CPT | Performed by: PHYSICAL THERAPIST

## 2019-04-01 PROCEDURE — 97110 THERAPEUTIC EXERCISES: CPT | Performed by: PHYSICAL THERAPIST

## 2019-04-05 ENCOUNTER — OFFICE VISIT (OUTPATIENT)
Dept: PHYSICAL THERAPY | Facility: CLINIC | Age: 74
End: 2019-04-05
Payer: COMMERCIAL

## 2019-04-05 DIAGNOSIS — Z96.652 AFTERCARE FOLLOWING LEFT KNEE JOINT REPLACEMENT SURGERY: Primary | ICD-10-CM

## 2019-04-05 DIAGNOSIS — Z47.1 AFTERCARE FOLLOWING LEFT KNEE JOINT REPLACEMENT SURGERY: Primary | ICD-10-CM

## 2019-04-05 PROCEDURE — 97140 MANUAL THERAPY 1/> REGIONS: CPT

## 2019-04-05 PROCEDURE — 97110 THERAPEUTIC EXERCISES: CPT

## 2019-04-08 ENCOUNTER — OFFICE VISIT (OUTPATIENT)
Dept: PHYSICAL THERAPY | Facility: CLINIC | Age: 74
End: 2019-04-08
Payer: COMMERCIAL

## 2019-04-08 DIAGNOSIS — Z47.1 AFTERCARE FOLLOWING LEFT KNEE JOINT REPLACEMENT SURGERY: Primary | ICD-10-CM

## 2019-04-08 DIAGNOSIS — Z96.652 AFTERCARE FOLLOWING LEFT KNEE JOINT REPLACEMENT SURGERY: Primary | ICD-10-CM

## 2019-04-08 PROCEDURE — 97110 THERAPEUTIC EXERCISES: CPT

## 2019-04-08 PROCEDURE — 97140 MANUAL THERAPY 1/> REGIONS: CPT

## 2019-04-09 ENCOUNTER — PATIENT OUTREACH (OUTPATIENT)
Dept: FAMILY MEDICINE CLINIC | Facility: HOSPITAL | Age: 74
End: 2019-04-09

## 2019-04-12 ENCOUNTER — OFFICE VISIT (OUTPATIENT)
Dept: OBGYN CLINIC | Facility: CLINIC | Age: 74
End: 2019-04-12
Payer: COMMERCIAL

## 2019-04-12 ENCOUNTER — OFFICE VISIT (OUTPATIENT)
Dept: PHYSICAL THERAPY | Facility: CLINIC | Age: 74
End: 2019-04-12
Payer: COMMERCIAL

## 2019-04-12 ENCOUNTER — APPOINTMENT (OUTPATIENT)
Dept: RADIOLOGY | Facility: CLINIC | Age: 74
End: 2019-04-12
Payer: COMMERCIAL

## 2019-04-12 VITALS
BODY MASS INDEX: 23.8 KG/M2 | DIASTOLIC BLOOD PRESSURE: 70 MMHG | HEART RATE: 80 BPM | WEIGHT: 170 LBS | HEIGHT: 71 IN | SYSTOLIC BLOOD PRESSURE: 120 MMHG

## 2019-04-12 DIAGNOSIS — Z96.652 AFTERCARE FOLLOWING LEFT KNEE JOINT REPLACEMENT SURGERY: Primary | ICD-10-CM

## 2019-04-12 DIAGNOSIS — Z47.1 AFTERCARE FOLLOWING LEFT KNEE JOINT REPLACEMENT SURGERY: Primary | ICD-10-CM

## 2019-04-12 DIAGNOSIS — Z96.652 AFTERCARE FOLLOWING LEFT KNEE JOINT REPLACEMENT SURGERY: ICD-10-CM

## 2019-04-12 DIAGNOSIS — Z47.1 AFTERCARE FOLLOWING LEFT KNEE JOINT REPLACEMENT SURGERY: ICD-10-CM

## 2019-04-12 DIAGNOSIS — M17.11 PRIMARY LOCALIZED OSTEOARTHRITIS OF RIGHT KNEE: ICD-10-CM

## 2019-04-12 PROCEDURE — 20610 DRAIN/INJ JOINT/BURSA W/O US: CPT | Performed by: PHYSICIAN ASSISTANT

## 2019-04-12 PROCEDURE — 99024 POSTOP FOLLOW-UP VISIT: CPT | Performed by: ORTHOPAEDIC SURGERY

## 2019-04-12 PROCEDURE — 97140 MANUAL THERAPY 1/> REGIONS: CPT

## 2019-04-12 PROCEDURE — 97110 THERAPEUTIC EXERCISES: CPT

## 2019-04-12 PROCEDURE — 73562 X-RAY EXAM OF KNEE 3: CPT

## 2019-04-12 RX ORDER — LIDOCAINE HYDROCHLORIDE 10 MG/ML
7 INJECTION, SOLUTION INFILTRATION; PERINEURAL
Status: COMPLETED | OUTPATIENT
Start: 2019-04-12 | End: 2019-04-12

## 2019-04-12 RX ORDER — BETAMETHASONE SODIUM PHOSPHATE AND BETAMETHASONE ACETATE 3; 3 MG/ML; MG/ML
6 INJECTION, SUSPENSION INTRA-ARTICULAR; INTRALESIONAL; INTRAMUSCULAR; SOFT TISSUE
Status: COMPLETED | OUTPATIENT
Start: 2019-04-12 | End: 2019-04-12

## 2019-04-12 RX ADMIN — LIDOCAINE HYDROCHLORIDE 7 ML: 10 INJECTION, SOLUTION INFILTRATION; PERINEURAL at 16:18

## 2019-04-12 RX ADMIN — BETAMETHASONE SODIUM PHOSPHATE AND BETAMETHASONE ACETATE 6 MG: 3; 3 INJECTION, SUSPENSION INTRA-ARTICULAR; INTRALESIONAL; INTRAMUSCULAR; SOFT TISSUE at 16:18

## 2019-04-15 ENCOUNTER — EVALUATION (OUTPATIENT)
Dept: PHYSICAL THERAPY | Facility: CLINIC | Age: 74
End: 2019-04-15
Payer: COMMERCIAL

## 2019-04-15 DIAGNOSIS — Z96.652 AFTERCARE FOLLOWING LEFT KNEE JOINT REPLACEMENT SURGERY: Primary | ICD-10-CM

## 2019-04-15 DIAGNOSIS — Z47.1 AFTERCARE FOLLOWING LEFT KNEE JOINT REPLACEMENT SURGERY: Primary | ICD-10-CM

## 2019-04-15 PROCEDURE — 97110 THERAPEUTIC EXERCISES: CPT | Performed by: PHYSICAL THERAPIST

## 2019-04-19 ENCOUNTER — OFFICE VISIT (OUTPATIENT)
Dept: PHYSICAL THERAPY | Facility: CLINIC | Age: 74
End: 2019-04-19
Payer: COMMERCIAL

## 2019-04-19 DIAGNOSIS — Z47.1 AFTERCARE FOLLOWING LEFT KNEE JOINT REPLACEMENT SURGERY: Primary | ICD-10-CM

## 2019-04-19 DIAGNOSIS — Z96.652 AFTERCARE FOLLOWING LEFT KNEE JOINT REPLACEMENT SURGERY: Primary | ICD-10-CM

## 2019-04-19 PROCEDURE — 97140 MANUAL THERAPY 1/> REGIONS: CPT | Performed by: PHYSICAL THERAPIST

## 2019-04-19 PROCEDURE — 97110 THERAPEUTIC EXERCISES: CPT | Performed by: PHYSICAL THERAPIST

## 2019-04-22 ENCOUNTER — OFFICE VISIT (OUTPATIENT)
Dept: PHYSICAL THERAPY | Facility: CLINIC | Age: 74
End: 2019-04-22
Payer: COMMERCIAL

## 2019-04-22 ENCOUNTER — OFFICE VISIT (OUTPATIENT)
Dept: FAMILY MEDICINE CLINIC | Facility: HOSPITAL | Age: 74
End: 2019-04-22
Payer: COMMERCIAL

## 2019-04-22 VITALS
BODY MASS INDEX: 24.48 KG/M2 | HEIGHT: 70 IN | DIASTOLIC BLOOD PRESSURE: 64 MMHG | TEMPERATURE: 97.8 F | SYSTOLIC BLOOD PRESSURE: 120 MMHG | HEART RATE: 68 BPM | WEIGHT: 171 LBS

## 2019-04-22 DIAGNOSIS — M89.9 DISORDER OF BONE: ICD-10-CM

## 2019-04-22 DIAGNOSIS — I48.0 PAROXYSMAL ATRIAL FIBRILLATION (HCC): ICD-10-CM

## 2019-04-22 DIAGNOSIS — Z47.1 AFTERCARE FOLLOWING LEFT KNEE JOINT REPLACEMENT SURGERY: Primary | ICD-10-CM

## 2019-04-22 DIAGNOSIS — E11.9 TYPE 2 DIABETES MELLITUS WITHOUT COMPLICATION, WITHOUT LONG-TERM CURRENT USE OF INSULIN (HCC): Primary | ICD-10-CM

## 2019-04-22 DIAGNOSIS — Z96.652 AFTERCARE FOLLOWING LEFT KNEE JOINT REPLACEMENT SURGERY: Primary | ICD-10-CM

## 2019-04-22 DIAGNOSIS — Z00.00 MEDICARE ANNUAL WELLNESS VISIT, SUBSEQUENT: ICD-10-CM

## 2019-04-22 DIAGNOSIS — I10 ESSENTIAL HYPERTENSION: ICD-10-CM

## 2019-04-22 PROBLEM — J18.9 PNEUMONIA: Status: RESOLVED | Noted: 2019-04-22 | Resolved: 2019-04-22

## 2019-04-22 PROBLEM — N17.9 AKI (ACUTE KIDNEY INJURY) (HCC): Status: RESOLVED | Noted: 2018-11-25 | Resolved: 2019-04-22

## 2019-04-22 PROCEDURE — 99214 OFFICE O/P EST MOD 30 MIN: CPT | Performed by: INTERNAL MEDICINE

## 2019-04-22 PROCEDURE — 1170F FXNL STATUS ASSESSED: CPT | Performed by: INTERNAL MEDICINE

## 2019-04-22 PROCEDURE — G0439 PPPS, SUBSEQ VISIT: HCPCS | Performed by: INTERNAL MEDICINE

## 2019-04-22 PROCEDURE — 1125F AMNT PAIN NOTED PAIN PRSNT: CPT | Performed by: INTERNAL MEDICINE

## 2019-04-22 PROCEDURE — 97110 THERAPEUTIC EXERCISES: CPT

## 2019-04-22 RX ORDER — LISINOPRIL 10 MG/1
10 TABLET ORAL DAILY
Qty: 90 TABLET | Refills: 1
Start: 2019-04-22 | End: 2019-06-27 | Stop reason: SDUPTHER

## 2019-04-22 RX ORDER — LISINOPRIL 20 MG/1
20 TABLET ORAL DAILY
Qty: 90 TABLET | Refills: 1 | Status: SHIPPED | OUTPATIENT
Start: 2019-04-22 | End: 2019-04-22 | Stop reason: SDUPTHER

## 2019-05-10 ENCOUNTER — HOSPITAL ENCOUNTER (OUTPATIENT)
Dept: RADIOLOGY | Facility: HOSPITAL | Age: 74
Discharge: HOME | End: 2019-05-10
Payer: COMMERCIAL

## 2019-05-10 ENCOUNTER — OFFICE VISIT (OUTPATIENT)
Dept: FAMILY MEDICINE CLINIC | Facility: HOSPITAL | Age: 74
End: 2019-05-10
Payer: COMMERCIAL

## 2019-05-10 VITALS
BODY MASS INDEX: 25.21 KG/M2 | OXYGEN SATURATION: 93 % | SYSTOLIC BLOOD PRESSURE: 124 MMHG | TEMPERATURE: 99 F | WEIGHT: 170.2 LBS | HEIGHT: 69 IN | HEART RATE: 86 BPM | DIASTOLIC BLOOD PRESSURE: 62 MMHG

## 2019-05-10 DIAGNOSIS — R05.9 COUGH: ICD-10-CM

## 2019-05-10 DIAGNOSIS — E11.9 TYPE 2 DIABETES MELLITUS WITHOUT COMPLICATION, WITHOUT LONG-TERM CURRENT USE OF INSULIN (HCC): ICD-10-CM

## 2019-05-10 DIAGNOSIS — R05.9 COUGH: Primary | ICD-10-CM

## 2019-05-10 PROCEDURE — 1160F RVW MEDS BY RX/DR IN RCRD: CPT | Performed by: INTERNAL MEDICINE

## 2019-05-10 PROCEDURE — 99214 OFFICE O/P EST MOD 30 MIN: CPT | Performed by: INTERNAL MEDICINE

## 2019-05-10 PROCEDURE — 3008F BODY MASS INDEX DOCD: CPT | Performed by: INTERNAL MEDICINE

## 2019-05-10 PROCEDURE — 71046 X-RAY EXAM CHEST 2 VIEWS: CPT

## 2019-05-10 RX ORDER — LEVOFLOXACIN 750 MG/1
750 TABLET ORAL EVERY 24 HOURS
Qty: 7 TABLET | Refills: 0 | Status: SHIPPED | OUTPATIENT
Start: 2019-05-10 | End: 2019-05-17

## 2019-06-24 ENCOUNTER — OFFICE VISIT (OUTPATIENT)
Dept: FAMILY MEDICINE CLINIC | Facility: HOSPITAL | Age: 74
End: 2019-06-24
Payer: COMMERCIAL

## 2019-06-24 VITALS
HEIGHT: 69 IN | SYSTOLIC BLOOD PRESSURE: 132 MMHG | WEIGHT: 169 LBS | HEART RATE: 70 BPM | DIASTOLIC BLOOD PRESSURE: 78 MMHG | TEMPERATURE: 97.8 F | BODY MASS INDEX: 25.03 KG/M2

## 2019-06-24 DIAGNOSIS — I10 ESSENTIAL HYPERTENSION: Primary | ICD-10-CM

## 2019-06-24 PROCEDURE — 3075F SYST BP GE 130 - 139MM HG: CPT | Performed by: INTERNAL MEDICINE

## 2019-06-24 PROCEDURE — 99213 OFFICE O/P EST LOW 20 MIN: CPT | Performed by: INTERNAL MEDICINE

## 2019-06-24 PROCEDURE — 3078F DIAST BP <80 MM HG: CPT | Performed by: INTERNAL MEDICINE

## 2019-06-24 PROCEDURE — 1036F TOBACCO NON-USER: CPT | Performed by: INTERNAL MEDICINE

## 2019-06-27 DIAGNOSIS — I10 ESSENTIAL HYPERTENSION: ICD-10-CM

## 2019-06-27 RX ORDER — LISINOPRIL 10 MG/1
10 TABLET ORAL DAILY
Qty: 90 TABLET | Refills: 1
Start: 2019-06-27 | End: 2019-08-16

## 2019-07-01 DIAGNOSIS — I10 ESSENTIAL HYPERTENSION: ICD-10-CM

## 2019-07-01 PROCEDURE — 4010F ACE/ARB THERAPY RXD/TAKEN: CPT | Performed by: INTERNAL MEDICINE

## 2019-07-01 RX ORDER — LISINOPRIL 10 MG/1
TABLET ORAL
Qty: 90 TABLET | Refills: 2 | Status: SHIPPED | OUTPATIENT
Start: 2019-07-01 | End: 2019-09-06 | Stop reason: SDUPTHER

## 2019-07-13 DIAGNOSIS — E78.5 DYSLIPIDEMIA: ICD-10-CM

## 2019-07-14 RX ORDER — PRAVASTATIN SODIUM 40 MG
TABLET ORAL
Qty: 87 TABLET | Refills: 1 | Status: SHIPPED | OUTPATIENT
Start: 2019-07-14 | End: 2019-09-13 | Stop reason: ALTCHOICE

## 2019-07-25 ENCOUNTER — HOSPITAL ENCOUNTER (EMERGENCY)
Facility: HOSPITAL | Age: 74
Discharge: HOME | End: 2019-07-25
Attending: EMERGENCY MEDICINE | Admitting: EMERGENCY MEDICINE
Payer: COMMERCIAL

## 2019-07-25 ENCOUNTER — APPOINTMENT (EMERGENCY)
Dept: RADIOLOGY | Facility: HOSPITAL | Age: 74
End: 2019-07-25
Payer: COMMERCIAL

## 2019-07-25 VITALS
BODY MASS INDEX: 24.21 KG/M2 | HEIGHT: 70 IN | HEART RATE: 76 BPM | OXYGEN SATURATION: 98 % | TEMPERATURE: 97.2 F | DIASTOLIC BLOOD PRESSURE: 79 MMHG | SYSTOLIC BLOOD PRESSURE: 176 MMHG | RESPIRATION RATE: 21 BRPM | WEIGHT: 169.09 LBS

## 2019-07-25 DIAGNOSIS — R03.0 ELEVATED BLOOD PRESSURE READING: ICD-10-CM

## 2019-07-25 DIAGNOSIS — R07.89 ATYPICAL CHEST PAIN: Primary | ICD-10-CM

## 2019-07-25 LAB
ALBUMIN SERPL BCP-MCNC: 4 G/DL (ref 3.5–5)
ALP SERPL-CCNC: 107 U/L (ref 46–116)
ALT SERPL W P-5'-P-CCNC: 18 U/L (ref 12–78)
ANION GAP SERPL CALCULATED.3IONS-SCNC: 6 MMOL/L (ref 4–13)
AST SERPL W P-5'-P-CCNC: 13 U/L (ref 5–45)
BASOPHILS # BLD AUTO: 0.02 THOUSANDS/ΜL (ref 0–0.1)
BASOPHILS NFR BLD AUTO: 0 % (ref 0–1)
BILIRUB SERPL-MCNC: 0.8 MG/DL (ref 0.2–1)
BUN SERPL-MCNC: 17 MG/DL (ref 5–25)
CALCIUM SERPL-MCNC: 9 MG/DL (ref 8.3–10.1)
CHLORIDE SERPL-SCNC: 104 MMOL/L (ref 100–108)
CO2 SERPL-SCNC: 32 MMOL/L (ref 21–32)
CREAT SERPL-MCNC: 1.05 MG/DL (ref 0.6–1.3)
EOSINOPHIL # BLD AUTO: 0.34 THOUSAND/ΜL (ref 0–0.61)
EOSINOPHIL NFR BLD AUTO: 3 % (ref 0–6)
ERYTHROCYTE [DISTWIDTH] IN BLOOD BY AUTOMATED COUNT: 15.5 % (ref 11.6–15.1)
GFR SERPL CREATININE-BSD FRML MDRD: 71 ML/MIN/1.73SQ M
GLUCOSE SERPL-MCNC: 146 MG/DL (ref 65–140)
HCT VFR BLD AUTO: 40.4 % (ref 36.5–49.3)
HGB BLD-MCNC: 12.4 G/DL (ref 12–17)
IMM GRANULOCYTES # BLD AUTO: 0.03 THOUSAND/UL (ref 0–0.2)
IMM GRANULOCYTES NFR BLD AUTO: 0 % (ref 0–2)
LYMPHOCYTES # BLD AUTO: 1.82 THOUSANDS/ΜL (ref 0.6–4.47)
LYMPHOCYTES NFR BLD AUTO: 19 % (ref 14–44)
MCH RBC QN AUTO: 26.2 PG (ref 26.8–34.3)
MCHC RBC AUTO-ENTMCNC: 30.7 G/DL (ref 31.4–37.4)
MCV RBC AUTO: 85 FL (ref 82–98)
MONOCYTES # BLD AUTO: 0.85 THOUSAND/ΜL (ref 0.17–1.22)
MONOCYTES NFR BLD AUTO: 9 % (ref 4–12)
NEUTROPHILS # BLD AUTO: 6.8 THOUSANDS/ΜL (ref 1.85–7.62)
NEUTS SEG NFR BLD AUTO: 69 % (ref 43–75)
NRBC BLD AUTO-RTO: 0 /100 WBCS
PLATELET # BLD AUTO: 267 THOUSANDS/UL (ref 149–390)
PMV BLD AUTO: 9.6 FL (ref 8.9–12.7)
POTASSIUM SERPL-SCNC: 3.8 MMOL/L (ref 3.5–5.3)
PROT SERPL-MCNC: 6.9 G/DL (ref 6.4–8.2)
RBC # BLD AUTO: 4.73 MILLION/UL (ref 3.88–5.62)
SODIUM SERPL-SCNC: 142 MMOL/L (ref 136–145)
TROPONIN I SERPL-MCNC: 0.02 NG/ML
TROPONIN I SERPL-MCNC: <0.02 NG/ML
WBC # BLD AUTO: 9.86 THOUSAND/UL (ref 4.31–10.16)

## 2019-07-25 PROCEDURE — 80053 COMPREHEN METABOLIC PANEL: CPT | Performed by: EMERGENCY MEDICINE

## 2019-07-25 PROCEDURE — 36415 COLL VENOUS BLD VENIPUNCTURE: CPT

## 2019-07-25 PROCEDURE — 93005 ELECTROCARDIOGRAM TRACING: CPT

## 2019-07-25 PROCEDURE — 71046 X-RAY EXAM CHEST 2 VIEWS: CPT

## 2019-07-25 PROCEDURE — 99284 EMERGENCY DEPT VISIT MOD MDM: CPT | Performed by: PHYSICIAN ASSISTANT

## 2019-07-25 PROCEDURE — 84484 ASSAY OF TROPONIN QUANT: CPT | Performed by: PHYSICIAN ASSISTANT

## 2019-07-25 PROCEDURE — 84484 ASSAY OF TROPONIN QUANT: CPT | Performed by: EMERGENCY MEDICINE

## 2019-07-25 PROCEDURE — 96374 THER/PROPH/DIAG INJ IV PUSH: CPT

## 2019-07-25 PROCEDURE — 99285 EMERGENCY DEPT VISIT HI MDM: CPT

## 2019-07-25 PROCEDURE — 85025 COMPLETE CBC W/AUTO DIFF WBC: CPT | Performed by: EMERGENCY MEDICINE

## 2019-07-25 RX ORDER — HYDRALAZINE HYDROCHLORIDE 20 MG/ML
10 INJECTION INTRAMUSCULAR; INTRAVENOUS ONCE
Status: COMPLETED | OUTPATIENT
Start: 2019-07-25 | End: 2019-07-25

## 2019-07-25 RX ADMIN — HYDRALAZINE HYDROCHLORIDE 10 MG: 20 INJECTION INTRAMUSCULAR; INTRAVENOUS at 21:57

## 2019-07-26 LAB
ATRIAL RATE: 70 BPM
ATRIAL RATE: 73 BPM
LEFT EYE DIABETIC RETINOPATHY: NORMAL
P AXIS: 79 DEGREES
PR INTERVAL: 180 MS
QRS AXIS: -22 DEGREES
QRS AXIS: -34 DEGREES
QRSD INTERVAL: 124 MS
QRSD INTERVAL: 126 MS
QT INTERVAL: 458 MS
QT INTERVAL: 470 MS
QTC INTERVAL: 504 MS
QTC INTERVAL: 507 MS
RIGHT EYE DIABETIC RETINOPATHY: NORMAL
T WAVE AXIS: 82 DEGREES
T WAVE AXIS: 85 DEGREES
VENTRICULAR RATE: 70 BPM
VENTRICULAR RATE: 73 BPM

## 2019-07-26 PROCEDURE — 93010 ELECTROCARDIOGRAM REPORT: CPT | Performed by: INTERNAL MEDICINE

## 2019-07-26 NOTE — ED PROVIDER NOTES
History  Chief Complaint   Patient presents with    Chest Pain     pt presents to ED c/o chest pressure that began an hour ago      71-year-old male with history of atrial fibrillation, GERD, coronary artery disease status post CABG x1, non-insulin-dependent diabetes, and hypertension presents emergency department for evaluation of mild left-sided chest pressure beginning approximately 2 hours prior to arrival   Symptoms do not worsen with exertion, and her nonradiating  He has never experienced similar symptoms in the past   Denies associated fever, chills, sweats, dizziness, nausea, vomiting, shortness of breath, or extremity paresthesias  Prior to Admission Medications   Prescriptions Last Dose Informant Patient Reported? Taking? Multiple Vitamin (MULTI-VITAMIN) tablet  Self No No   Sig: Take 1 tablet by mouth daily   Multiple Vitamins-Minerals (ICAPS AREDS 2) CAPS  Self Yes No   Sig: Take 1 capsule by mouth daily   ascorbic acid (VITAMIN C) 500 mg tablet  Self No No   Sig: Take 1 tablet (500 mg total) by mouth 2 (two) times a day   Patient taking differently: Take 500 mg by mouth daily    aspirin 81 MG tablet  Self Yes No   Sig: Take 1 tablet (81 mg total) by mouth daily   lisinopril (ZESTRIL) 10 mg tablet   No No   Sig: Take 1 tablet (10 mg total) by mouth daily   lisinopril (ZESTRIL) 10 mg tablet   No No   Sig: TAKE 1 TABLET BY MOUTH EVERY DAY   metFORMIN (GLUCOPHAGE-XR) 750 mg 24 hr tablet   No No   Sig: Take 1 tablet (750 mg total) by mouth daily with dinner for 30 days   Patient taking differently: Take 750 mg by mouth daily with breakfast    metoprolol succinate (TOPROL-XL) 100 mg 24 hr tablet  Self Yes No   Sig: Take 100 mg by mouth daily    nitroglycerin (NITROSTAT) 0 3 mg SL tablet  Self Yes No   Sig: Nitrostat 0 3 mg sublingual tablet   Place 1 tablet as needed by sublingual route as needed for 90 days     omeprazole (PRILOSEC) 20 mg delayed release capsule  Self Yes No   Sig: Take 1 capsule by mouth Daily   pravastatin (PRAVACHOL) 40 mg tablet   No No   Sig: TAKE 1 TABLET BY MOUTH AT BEDTIME      Facility-Administered Medications: None       Past Medical History:   Diagnosis Date    Atrial fibrillation (HCC)     Cancer (HCC)     Lymphoma    Cataract     Colitis     Fatty liver     GERD (gastroesophageal reflux disease)     History of chemotherapy     History of radiation therapy     History of shingles 2018    Pneumonia        Past Surgical History:   Procedure Laterality Date    AORTIC VALVE REPLACEMENT      COLECTOMY      COLONOSCOPY      DENTAL SURGERY      KNEE SURGERY      LYMPHADENECTOMY      OTHER SURGICAL HISTORY      MAZE PROCEDURE    CT TOTAL KNEE ARTHROPLASTY Left 1/28/2019    Procedure: ARTHROPLASTY LEFT KNEE TOTAL;  Surgeon: Ron Zendejas MD;  Location:  MAIN OR;  Service: Orthopedics       Family History   Problem Relation Age of Onset    Heart block Family     Coronary artery disease Mother     Thrombosis Mother     Hypertension Mother    [de-identified] Arthritis Mother     Hyperlipidemia Mother     Diabetes Father     Hypertension Father     Arthritis Father     Sudden death Father         cardiac    Colon cancer Paternal Grandfather      I have reviewed and agree with the history as documented  Social History     Tobacco Use    Smoking status: Former Smoker     Packs/day: 1 00     Years: 40 00     Pack years: 40 00     Types: Cigarettes     Last attempt to quit: 2009     Years since quitting: 10 5    Smokeless tobacco: Never Used   Substance Use Topics    Alcohol use: Yes     Frequency: Monthly or less     Drinks per session: 1 or 2     Binge frequency: Never     Comment: has not drank in over a month    Drug use: No        Review of Systems   Constitutional: Negative for chills, diaphoresis and fever  Eyes: Negative for visual disturbance  Respiratory: Negative for cough and shortness of breath  Cardiovascular: Positive for chest pain   Negative for palpitations  Gastrointestinal: Negative for abdominal pain, diarrhea, nausea and vomiting  Genitourinary: Negative for dysuria, flank pain and frequency  Musculoskeletal: Negative for arthralgias and myalgias  Skin: Negative for color change, rash and wound  Allergic/Immunologic: Negative for immunocompromised state  Neurological: Negative for dizziness and light-headedness  Hematological: Does not bruise/bleed easily  Psychiatric/Behavioral: Negative for confusion  The patient is not nervous/anxious  Physical Exam  Physical Exam   Constitutional: He is oriented to person, place, and time  He appears well-developed and well-nourished  No distress  HENT:   Head: Normocephalic and atraumatic  Mouth/Throat: Oropharynx is clear and moist    Eyes: Pupils are equal, round, and reactive to light  No scleral icterus  Neck: No JVD present  Cardiovascular: Normal rate and regular rhythm  Exam reveals no gallop and no friction rub  No murmur heard  Pulmonary/Chest: No respiratory distress  He has no wheezes  He has no rales  No reproducible chest wall pain   Abdominal: Soft  Bowel sounds are normal  He exhibits no distension and no mass  There is no tenderness  There is no guarding  Neurological: He is alert and oriented to person, place, and time  Skin: Skin is warm and dry  Capillary refill takes less than 2 seconds  He is not diaphoretic  Psychiatric: He has a normal mood and affect  His behavior is normal    Vitals reviewed        Vital Signs  ED Triage Vitals [07/25/19 1808]   Temperature Pulse Respirations Blood Pressure SpO2   (!) 97 2 °F (36 2 °C) 69 17 (!) 184/89 97 %      Temp Source Heart Rate Source Patient Position - Orthostatic VS BP Location FiO2 (%)   Temporal Monitor Lying Right arm --      Pain Score       7           Vitals:    07/25/19 2145 07/25/19 2158 07/25/19 2200 07/25/19 2215   BP: (!) 193/83 (!) 191/92 (!) 191/92 (!) 176/79   Pulse: 74 74 74 76   Patient Position - Orthostatic VS:             Visual Acuity      ED Medications  Medications   hydrALAZINE (APRESOLINE) injection 10 mg (10 mg Intravenous Given 7/25/19 2157)       Diagnostic Studies  Results Reviewed     Procedure Component Value Units Date/Time    Troponin I [[id number]]  (Normal) Collected:  07/25/19 2110    Lab Status:  Final result Specimen:  Blood from Arm, Left Updated:  07/25/19 2200     Troponin I <0 02 ng/mL     Comprehensive metabolic panel [[id number]]  (Abnormal) Collected:  07/25/19 1807    Lab Status:  Final result Specimen:  Blood from Arm, Left Updated:  07/25/19 1849     Sodium 142 mmol/L      Potassium 3 8 mmol/L      Chloride 104 mmol/L      CO2 32 mmol/L      ANION GAP 6 mmol/L      BUN 17 mg/dL      Creatinine 1 05 mg/dL      Glucose 146 mg/dL      Calcium 9 0 mg/dL      AST 13 U/L      ALT 18 U/L      Alkaline Phosphatase 107 U/L      Total Protein 6 9 g/dL      Albumin 4 0 g/dL      Total Bilirubin 0 80 mg/dL      eGFR 71 ml/min/1 73sq m     Narrative:       Daria guidelines for Chronic Kidney Disease (CKD):     Stage 1 with normal or high GFR (GFR > 90 mL/min/1 73 square meters)    Stage 2 Mild CKD (GFR = 60-89 mL/min/1 73 square meters)    Stage 3A Moderate CKD (GFR = 45-59 mL/min/1 73 square meters)    Stage 3B Moderate CKD (GFR = 30-44 mL/min/1 73 square meters)    Stage 4 Severe CKD (GFR = 15-29 mL/min/1 73 square meters)    Stage 5 End Stage CKD (GFR <15 mL/min/1 73 square meters)  Note: GFR calculation is accurate only with a steady state creatinine    Troponin I [[id number]]  (Normal) Collected:  07/25/19 1807    Lab Status:  Final result Specimen:  Blood from Arm, Left Updated:  07/25/19 1838     Troponin I 0 02 ng/mL     CBC and differential [[id number]]  (Abnormal) Collected:  07/25/19 1807    Lab Status:  Final result Specimen:  Blood from Arm, Left Updated:  07/25/19 1818     WBC 9 86 Thousand/uL      RBC 4 73 Million/uL      Hemoglobin 12 4 g/dL      Hematocrit 40 4 %      MCV 85 fL      MCH 26 2 pg      MCHC 30 7 g/dL      RDW 15 5 %      MPV 9 6 fL      Platelets 262 Thousands/uL      nRBC 0 /100 WBCs      Neutrophils Relative 69 %      Immat GRANS % 0 %      Lymphocytes Relative 19 %      Monocytes Relative 9 %      Eosinophils Relative 3 %      Basophils Relative 0 %      Neutrophils Absolute 6 80 Thousands/µL      Immature Grans Absolute 0 03 Thousand/uL      Lymphocytes Absolute 1 82 Thousands/µL      Monocytes Absolute 0 85 Thousand/µL      Eosinophils Absolute 0 34 Thousand/µL      Basophils Absolute 0 02 Thousands/µL                  XR chest 2 views   ED Interpretation by Samaria Livingston PA-C (07/25 1939)   No acute cardiopulmonary disease                 Procedures  ECG 12 Lead Documentation Only  Date/Time: 7/25/2019 6:15 PM  Performed by: Samaria Livingston PA-C  Authorized by: Samaria Livingston PA-C     Indications / Diagnosis:  Chest pain  ECG reviewed by me, the ED Provider: yes    Patient location:  ED  Previous ECG:     Previous ECG:  Unavailable  Interpretation:     Interpretation: non-specific    Rate:     ECG rate:  70    ECG rate assessment: normal    Rhythm:     Rhythm: sinus rhythm    Ectopy:     Ectopy: PVCs    QRS:     QRS axis:  Left    QRS intervals:  Normal  Conduction:     Conduction: normal    ST segments:     ST segments:  Normal  T waves:     T waves: normal             ED Course         HEART Risk Score      Most Recent Value   History  1 Filed at: 07/25/2019 2003   ECG  0 Filed at: 07/25/2019 2003   Age  2 Filed at: 07/25/2019 2003   Risk Factors  2 Filed at: 07/25/2019 2003   Troponin  0 Filed at: 07/25/2019 2003   Heart Score Risk Calculator   History  1 Filed at: 07/25/2019 2003   ECG  0 Filed at: 07/25/2019 2003   Age  2 Filed at: 07/25/2019 2003   Risk Factors  2 Filed at: 07/25/2019 2003   Troponin  0 Filed at: 07/25/2019 2003   HEART Score  5 Filed at: 07/25/2019 2003   HEART Score  5 Filed at: 07/25/2019 2003 MDM  Number of Diagnoses or Management Options  Atypical chest pain: new and requires workup  Elevated blood pressure reading: new and requires workup  Diagnosis management comments: 81-year-old male presents with atypical chest pain/pressure  His symptoms did improve while he was in the emergency department  Initial and delta troponins are unremarkable and EKG is normal   I strongly encouraged the patient that he needed to be admitted for chest pain observation given his multitude of risk factors, however he declined due to having an important ophthalmology appointment in the morning  He is strongly encouraged to return emergency department should symptoms persist   He will follow up his primary care physician regarding his elevated blood pressure  Amount and/or Complexity of Data Reviewed  Clinical lab tests: ordered and reviewed  Tests in the radiology section of CPT®: ordered and reviewed  Tests in the medicine section of CPT®: ordered and reviewed  Review and summarize past medical records: yes  Discuss the patient with other providers: yes  Independent visualization of images, tracings, or specimens: yes        Disposition  Final diagnoses:   Atypical chest pain   Elevated blood pressure reading     Time reflects when diagnosis was documented in both MDM as applicable and the Disposition within this note     Time User Action Codes Description Comment    7/25/2019 10:12 PM Danii Miranda Add [R07 89] Atypical chest pain     7/25/2019 10:12 PM Danii Miranda Add [R03 0] Elevated blood pressure reading       ED Disposition     ED Disposition Condition Date/Time Comment    Discharge Stable u Jul 25, 2019 10:12 PM Marquis June discharge to home/self care              Follow-up Information     Follow up With Specialties Details Why Obdulio Metz DO Internal Medicine, Family Medicine Schedule an appointment as soon as possible for a visit   [address] [facility] [address]            Discharge Medication List as of 7/25/2019 10:12 PM      CONTINUE these medications which have NOT CHANGED    Details   ascorbic acid (VITAMIN C) 500 mg tablet Take 1 tablet (500 mg total) by mouth 2 (two) times a day, Starting Fri 1/4/2019, Normal      aspirin 81 MG tablet Take 1 tablet (81 mg total) by mouth daily, Starting Fri 3/29/2019, Historical Med      !! lisinopril (ZESTRIL) 10 mg tablet Take 1 tablet (10 mg total) by mouth daily, Starting Thu 6/27/2019, No Print      !! lisinopril (ZESTRIL) 10 mg tablet TAKE 1 TABLET BY MOUTH EVERY DAY, Normal      metFORMIN (GLUCOPHAGE-XR) 750 mg 24 hr tablet Take 1 tablet (750 mg total) by mouth daily with dinner for 30 days, Starting Wed 1/9/2019, Until Fri 3/29/2019, Normal      metoprolol succinate (TOPROL-XL) 100 mg 24 hr tablet Take 100 mg by mouth daily , Starting Thu 10/18/2018, Historical Med      Multiple Vitamin (MULTI-VITAMIN) tablet Take 1 tablet by mouth daily, Starting Fri 1/4/2019, Normal      Multiple Vitamins-Minerals (ICAPS AREDS 2) CAPS Take 1 capsule by mouth daily, Historical Med      nitroglycerin (NITROSTAT) 0 3 mg SL tablet Nitrostat 0 3 mg sublingual tablet   Place 1 tablet as needed by sublingual route as needed for 90 days  , Historical Med      omeprazole (PRILOSEC) 20 mg delayed release capsule Take 1 capsule by mouth Daily, Starting Fri 9/14/2012, Historical Med      pravastatin (PRAVACHOL) 40 mg tablet TAKE 1 TABLET BY MOUTH AT BEDTIME, Normal       !! - Potential duplicate medications found  Please discuss with provider  No discharge procedures on file      ED Provider  Electronically Signed by           Pushpa Molina PA-C  07/25/19 8968

## 2019-07-31 ENCOUNTER — VBI (OUTPATIENT)
Dept: ADMINISTRATIVE | Facility: OTHER | Age: 74
End: 2019-07-31

## 2019-07-31 NOTE — TELEPHONE ENCOUNTER
Inessa Coates    ED Visit Information     Ed visit date: 7/25/2019  Diagnosis Description: Atypical chest pain; Elevated blood pressure reading  In Network? Yes [facility]  Discharge status: Home  Discharged with meds ? No  Number of ED visits to date: 3 (2 ED to hospital admission)  ED Severity:n/a     Outreach Information    Outreach successful: Yes 2  Date letter mailed:n/a  Date Finalized:8/1/2019    Care Coordination    Follow up appointment with specialilty: yes 9/13/2019 (Cardiology) declined ED f/u appt with PCP   Transportation issues ? Yes    [facility] [employer] type:  7 Day Outreach  Emergent necessity warranted by diagnosis:  No, Yes  [facility] PCP:  Yes  Transportation:  Friend/Family Transport  07/31/2019 10:15 AM Phone (Lawrence Lagos) Antony Zavala (Self) [phone] (H)   Left Message - requesting a call back  Unable to reach patient regarding recent ED visit on 7/25 for Atypical chest pain; Elevated blood pressure reading  Patient declined to be admitted  Patient was discharged without medication and advised to follow up with his PCP  2nd attempt will be made on 7/31

## 2019-08-16 ENCOUNTER — TELEPHONE (OUTPATIENT)
Dept: CARDIOLOGY CLINIC | Facility: CLINIC | Age: 74
End: 2019-08-16

## 2019-08-16 ENCOUNTER — TELEPHONE (OUTPATIENT)
Dept: FAMILY MEDICINE CLINIC | Facility: HOSPITAL | Age: 74
End: 2019-08-16

## 2019-08-16 NOTE — TELEPHONE ENCOUNTER
Per the initial visit  rom [address] bp was 184/90 and complained of slight chest pain and swelling of both legs  3wks ago he was in the ER for chest pain- bp was 190/94  He was Dc'd with no changes to his med  He was supposed to see cardio and alberto soon after but never made f/u because he had a f/u in sept with her and was willing to wait      [facility] rached out to cardio (dr Nancy Carpenter) he increased lisisnopril from 10mgs to 20mgs and she addressed the adema but is waiting to see if a diuretic will be rxd or not

## 2019-08-16 NOTE — TELEPHONE ENCOUNTER
Pt seen today by NP in his home  His bp is 184/90 and has been elevated since in the ER on 7/25  He did have chest discomfort several days ago  Otherwise he is feeling ok        Taking lisinopril 10 mg qd              Toprol-xl 100 mg qd        Please advise

## 2019-08-20 ENCOUNTER — HOSPITAL ENCOUNTER (EMERGENCY)
Facility: HOSPITAL | Age: 74
Discharge: HOME | End: 2019-08-20
Attending: EMERGENCY MEDICINE
Payer: COMMERCIAL

## 2019-08-20 ENCOUNTER — APPOINTMENT (EMERGENCY)
Dept: RADIOLOGY | Facility: HOSPITAL | Age: 74
End: 2019-08-20
Payer: COMMERCIAL

## 2019-08-20 VITALS
RESPIRATION RATE: 18 BRPM | BODY MASS INDEX: 24.5 KG/M2 | WEIGHT: 175 LBS | SYSTOLIC BLOOD PRESSURE: 155 MMHG | HEIGHT: 71 IN | TEMPERATURE: 98.5 F | DIASTOLIC BLOOD PRESSURE: 72 MMHG | HEART RATE: 74 BPM | OXYGEN SATURATION: 96 %

## 2019-08-20 DIAGNOSIS — I50.31 ACUTE DIASTOLIC CONGESTIVE HEART FAILURE (HCC): Primary | ICD-10-CM

## 2019-08-20 DIAGNOSIS — R07.9 CHEST PAIN: ICD-10-CM

## 2019-08-20 LAB
ALBUMIN SERPL BCP-MCNC: 3.9 G/DL (ref 3.5–5)
ALP SERPL-CCNC: 122 U/L (ref 46–116)
ALT SERPL W P-5'-P-CCNC: 23 U/L (ref 12–78)
ANION GAP SERPL CALCULATED.3IONS-SCNC: 11 MMOL/L (ref 4–13)
AST SERPL W P-5'-P-CCNC: 16 U/L (ref 5–45)
BASOPHILS # BLD AUTO: 0.1 THOUSANDS/ΜL (ref 0–0.1)
BASOPHILS NFR BLD AUTO: 1 % (ref 0–1)
BILIRUB SERPL-MCNC: 0.7 MG/DL (ref 0.2–1)
BUN SERPL-MCNC: 17 MG/DL (ref 5–25)
CALCIUM SERPL-MCNC: 9.2 MG/DL (ref 8.3–10.1)
CHLORIDE SERPL-SCNC: 103 MMOL/L (ref 100–108)
CO2 SERPL-SCNC: 29 MMOL/L (ref 21–32)
CREAT SERPL-MCNC: 1.07 MG/DL (ref 0.6–1.3)
EOSINOPHIL # BLD AUTO: 0.4 THOUSAND/ΜL (ref 0–0.61)
EOSINOPHIL NFR BLD AUTO: 4 % (ref 0–6)
ERYTHROCYTE [DISTWIDTH] IN BLOOD BY AUTOMATED COUNT: 15.4 % (ref 11.6–15.1)
GFR SERPL CREATININE-BSD FRML MDRD: 69 ML/MIN/1.73SQ M
GLUCOSE SERPL-MCNC: 109 MG/DL (ref 65–140)
HCT VFR BLD AUTO: 39.8 % (ref 36.5–49.3)
HGB BLD-MCNC: 12.3 G/DL (ref 12–17)
IMM GRANULOCYTES # BLD AUTO: 0.02 THOUSAND/UL (ref 0–0.2)
IMM GRANULOCYTES NFR BLD AUTO: 0 % (ref 0–2)
LYMPHOCYTES # BLD AUTO: 2.08 THOUSANDS/ΜL (ref 0.6–4.47)
LYMPHOCYTES NFR BLD AUTO: 21 % (ref 14–44)
MCH RBC QN AUTO: 26.4 PG (ref 26.8–34.3)
MCHC RBC AUTO-ENTMCNC: 30.9 G/DL (ref 31.4–37.4)
MCV RBC AUTO: 85 FL (ref 82–98)
MONOCYTES # BLD AUTO: 1.04 THOUSAND/ΜL (ref 0.17–1.22)
MONOCYTES NFR BLD AUTO: 10 % (ref 4–12)
NEUTROPHILS # BLD AUTO: 6.53 THOUSANDS/ΜL (ref 1.85–7.62)
NEUTS SEG NFR BLD AUTO: 64 % (ref 43–75)
NT-PROBNP SERPL-MCNC: 6069 PG/ML
PLATELET # BLD AUTO: 251 THOUSANDS/UL (ref 149–390)
PMV BLD AUTO: 10 FL (ref 8.9–12.7)
POTASSIUM SERPL-SCNC: 3.7 MMOL/L (ref 3.5–5.3)
PROT SERPL-MCNC: 6.7 G/DL (ref 6.4–8.2)
RBC # BLD AUTO: 4.66 MILLION/UL (ref 3.88–5.62)
SODIUM SERPL-SCNC: 143 MMOL/L (ref 136–145)
TROPONIN I SERPL-MCNC: <0.02 NG/ML
WBC # BLD AUTO: 10.17 THOUSAND/UL (ref 4.31–10.16)

## 2019-08-20 PROCEDURE — 84484 ASSAY OF TROPONIN QUANT: CPT | Performed by: EMERGENCY MEDICINE

## 2019-08-20 PROCEDURE — 71046 X-RAY EXAM CHEST 2 VIEWS: CPT

## 2019-08-20 PROCEDURE — 85025 COMPLETE CBC W/AUTO DIFF WBC: CPT | Performed by: EMERGENCY MEDICINE

## 2019-08-20 PROCEDURE — 99285 EMERGENCY DEPT VISIT HI MDM: CPT | Performed by: PHYSICIAN ASSISTANT

## 2019-08-20 PROCEDURE — 80053 COMPREHEN METABOLIC PANEL: CPT | Performed by: EMERGENCY MEDICINE

## 2019-08-20 PROCEDURE — 99285 EMERGENCY DEPT VISIT HI MDM: CPT

## 2019-08-20 PROCEDURE — 96374 THER/PROPH/DIAG INJ IV PUSH: CPT

## 2019-08-20 PROCEDURE — 36415 COLL VENOUS BLD VENIPUNCTURE: CPT | Performed by: EMERGENCY MEDICINE

## 2019-08-20 PROCEDURE — 83880 ASSAY OF NATRIURETIC PEPTIDE: CPT

## 2019-08-20 PROCEDURE — 96375 TX/PRO/DX INJ NEW DRUG ADDON: CPT

## 2019-08-20 PROCEDURE — 93005 ELECTROCARDIOGRAM TRACING: CPT

## 2019-08-20 RX ORDER — FUROSEMIDE 40 MG/1
40 TABLET ORAL 2 TIMES DAILY
Qty: 30 TABLET | Refills: 0 | Status: SHIPPED | OUTPATIENT
Start: 2019-08-20 | End: 2019-09-06 | Stop reason: SDUPTHER

## 2019-08-20 RX ORDER — FUROSEMIDE 10 MG/ML
40 INJECTION INTRAMUSCULAR; INTRAVENOUS ONCE
Status: COMPLETED | OUTPATIENT
Start: 2019-08-20 | End: 2019-08-20

## 2019-08-20 RX ORDER — HYDRALAZINE HYDROCHLORIDE 20 MG/ML
10 INJECTION INTRAMUSCULAR; INTRAVENOUS ONCE
Status: COMPLETED | OUTPATIENT
Start: 2019-08-20 | End: 2019-08-20

## 2019-08-20 RX ADMIN — HYDRALAZINE HYDROCHLORIDE 10 MG: 20 INJECTION INTRAMUSCULAR; INTRAVENOUS at 18:16

## 2019-08-20 RX ADMIN — FUROSEMIDE 40 MG: 10 INJECTION, SOLUTION INTRAMUSCULAR; INTRAVENOUS at 18:49

## 2019-08-20 NOTE — ED PROVIDER NOTES
History  Chief Complaint   Patient presents with    Chest Pain     Danica complreneesleann of [facility] chest pressure substernally  Patient states the intensity gets worse  Patient is also SOB     69 yo male w/ hx of lymphoma in remission, paroxysmal a-fib, AVR, HTN, and NIDDM presents to the Emergency Department for evaluation of recurrent chest pain  Was seen by me in this ED last month for same, cardiac workup was unremarkable  He has since had his lisinopril increased from 10mg to 20mg qd without change in elevated BP  He notes persistent chest pain over the past 2 days, worse with exertion and associated with shortness of breath  Pain is described as "pressure"  No fevers, chills, sweats, back pain, N/V  Also reports mild weight gain over the past week  Prior to Admission Medications   Prescriptions Last Dose Informant Patient Reported? Taking?    Multiple Vitamin (MULTI-VITAMIN) tablet  Self No Yes   Sig: Take 1 tablet by mouth daily   Multiple Vitamins-Minerals (ICAPS AREDS 2) CAPS Not Taking at Unknown time Self Yes No   Sig: Take 1 capsule by mouth daily   ascorbic acid (VITAMIN C) 500 mg tablet Not Taking at Unknown time Self No No   Sig: Take 1 tablet (500 mg total) by mouth 2 (two) times a day   Patient not taking: Reported on 8/20/2019   aspirin 81 MG tablet  Self Yes Yes   Sig: Take 1 tablet (81 mg total) by mouth daily   lisinopril (ZESTRIL) 10 mg tablet   No Yes   Sig: TAKE 1 TABLET BY MOUTH EVERY DAY   Patient taking differently: Take 20 mg by mouth daily    metFORMIN (GLUCOPHAGE-XR) 750 mg 24 hr tablet   No Yes   Sig: Take 1 tablet (750 mg total) by mouth daily with dinner for 30 days   Patient taking differently: Take 750 mg by mouth daily with breakfast    metoprolol succinate (TOPROL-XL) 100 mg 24 hr tablet  Self Yes Yes   Sig: Take 100 mg by mouth daily    nitroglycerin (NITROSTAT) 0 3 mg SL tablet  Self Yes Yes   Sig: Nitrostat 0 3 mg sublingual tablet   Place 1 tablet as needed by sublingual route as needed for 90 days  omeprazole (PRILOSEC) 20 mg delayed release capsule  Self Yes Yes   Sig: Take 1 capsule by mouth Daily   pravastatin (PRAVACHOL) 40 mg tablet   No Yes   Sig: TAKE 1 TABLET BY MOUTH AT BEDTIME      Facility-Administered Medications: None       Past Medical History:   Diagnosis Date    Atrial fibrillation (HCC)     Cancer (HCC)     Lymphoma    Cataract     Colitis     Fatty liver     GERD (gastroesophageal reflux disease)     History of chemotherapy     History of radiation therapy     History of shingles 2018    Pneumonia        Past Surgical History:   Procedure Laterality Date    AORTIC VALVE REPLACEMENT      COLECTOMY      COLONOSCOPY      DENTAL SURGERY      KNEE SURGERY      LYMPHADENECTOMY      OTHER SURGICAL HISTORY      MAZE PROCEDURE    AK TOTAL KNEE ARTHROPLASTY Left 1/28/2019    Procedure: ARTHROPLASTY LEFT KNEE TOTAL;  Surgeon: New Hightower MD;  Location:  MAIN OR;  Service: Orthopedics       Family History   Problem Relation Age of Onset    Heart block Family     Coronary artery disease Mother     Thrombosis Mother     Hypertension Mother    Learta January Arthritis Mother     Hyperlipidemia Mother     Diabetes Father     Hypertension Father     Arthritis Father     Sudden death Father         cardiac    Colon cancer Paternal Grandfather      I have reviewed and agree with the history as documented  Social History     Tobacco Use    Smoking status: Former Smoker     Packs/day: 1 00     Years: 40 00     Pack years: 40 00     Types: Cigarettes     Last attempt to quit: 2009     Years since quitting: 10 6    Smokeless tobacco: Never Used   Substance Use Topics    Alcohol use: Yes     Frequency: Monthly or less     Drinks per session: 1 or 2     Binge frequency: Never     Comment: has not drank in over a month    Drug use: No        Review of Systems   Constitutional: Negative for chills, diaphoresis and fever     Eyes: Negative for visual disturbance  Respiratory: Positive for shortness of breath  Negative for cough  Cardiovascular: Positive for chest pain and leg swelling  Negative for palpitations  Gastrointestinal: Negative for abdominal pain, diarrhea, nausea and vomiting  Genitourinary: Negative for dysuria, flank pain and frequency  Musculoskeletal: Negative for arthralgias and myalgias  Skin: Negative for color change, rash and wound  Allergic/Immunologic: Negative for immunocompromised state  Neurological: Negative for dizziness and light-headedness  Hematological: Does not bruise/bleed easily  Psychiatric/Behavioral: Negative for confusion  The patient is not nervous/anxious  Physical Exam  Physical Exam   Constitutional: He is oriented to person, place, and time  He appears well-developed and well-nourished  No distress  HENT:   Head: Normocephalic and atraumatic  Eyes: Pupils are equal, round, and reactive to light  No scleral icterus  Neck: No JVD present  Cardiovascular: Normal rate and regular rhythm  Exam reveals no gallop and no friction rub  No murmur heard  Pulses:       Radial pulses are 2+ on the right side, and 2+ on the left side  Dorsalis pedis pulses are 2+ on the right side, and 2+ on the left side  Pulmonary/Chest: No respiratory distress  He has no decreased breath sounds  He has no wheezes  He has no rhonchi  He has no rales  Abdominal: Soft  Bowel sounds are normal  He exhibits no distension and no mass  There is no tenderness  There is no guarding  Musculoskeletal:        Right lower leg: He exhibits edema (2+ to the mid calf)  Left lower leg: He exhibits edema (2+ to the mid calf)  Neurological: He is alert and oriented to person, place, and time  Skin: Skin is warm and dry  Capillary refill takes less than 2 seconds  He is not diaphoretic  Psychiatric: He has a normal mood and affect  His behavior is normal    Vitals reviewed        Vital Signs  ED Triage Vitals   Temperature Pulse Respirations Blood Pressure SpO2   08/20/19 1745 08/20/19 1745 08/20/19 1745 08/20/19 1749 08/20/19 1745   98 5 °F (36 9 °C) 75 20 (!) 198/95 94 %      Temp src Heart Rate Source Patient Position - Orthostatic VS BP Location FiO2 (%)   -- 08/20/19 1800 08/20/19 1800 08/20/19 1800 --    Monitor Sitting Right arm       Pain Score       08/20/19 1745       7           Vitals:    08/20/19 1816 08/20/19 1830 08/20/19 1845 08/20/19 1900   BP: (!) 184/88 148/78 156/77 155/72   Pulse:  71 75 74   Patient Position - Orthostatic VS:  Sitting Sitting Lying         Visual Acuity      ED Medications  Medications   hydrALAZINE (APRESOLINE) injection 10 mg (10 mg Intravenous Given 8/20/19 1816)   furosemide (LASIX) injection 40 mg (40 mg Intravenous Given 8/20/19 1849)       Diagnostic Studies  Results Reviewed     Procedure Component Value Units Date/Time    NT-BNP PRO (BNP for [place], [place], [place], [place], [place], [facility], [place], [place] campuses) [[id number]]  (Abnormal) Collected:  08/20/19 1750    Lab Status:  Final result Specimen:  Blood from Arm, Left Updated:  08/20/19 1856     NT-proBNP 6,069 pg/mL     Troponin I [[id number]]  (Normal) Collected:  08/20/19 1750    Lab Status:  Final result Specimen:  Blood from Arm, Left Updated:  08/20/19 1824     Troponin I <0 02 ng/mL     Comprehensive metabolic panel [[id number]]  (Abnormal) Collected:  08/20/19 1750    Lab Status:  Final result Specimen:  Blood from Arm, Left Updated:  08/20/19 1821     Sodium 143 mmol/L      Potassium 3 7 mmol/L      Chloride 103 mmol/L      CO2 29 mmol/L      ANION GAP 11 mmol/L      BUN 17 mg/dL      Creatinine 1 07 mg/dL      Glucose 109 mg/dL      Calcium 9 2 mg/dL      AST 16 U/L      ALT 23 U/L      Alkaline Phosphatase 122 U/L      Total Protein 6 7 g/dL      Albumin 3 9 g/dL      Total Bilirubin 0 70 mg/dL      eGFR 69 ml/min/1 73sq m     Narrative:       [place] guidelines for Chronic Kidney Disease (CKD):     Stage 1 with normal or high GFR (GFR > 90 mL/min/1 73 square meters)    Stage 2 Mild CKD (GFR = 60-89 mL/min/1 73 square meters)    Stage 3A Moderate CKD (GFR = 45-59 mL/min/1 73 square meters)    Stage 3B Moderate CKD (GFR = 30-44 mL/min/1 73 square meters)    Stage 4 Severe CKD (GFR = 15-29 mL/min/1 73 square meters)    Stage 5 End Stage CKD (GFR <15 mL/min/1 73 square meters)  Note: GFR calculation is accurate only with a steady state creatinine    CBC and differential [[id number]]  (Abnormal) Collected:  08/20/19 1750    Lab Status:  Final result Specimen:  Blood from Arm, Left Updated:  08/20/19 1759     WBC 10 17 Thousand/uL      RBC 4 66 Million/uL      Hemoglobin 12 3 g/dL      Hematocrit 39 8 %      MCV 85 fL      MCH 26 4 pg      MCHC 30 9 g/dL      RDW 15 4 %      MPV 10 0 fL      Platelets 383 Thousands/uL      Neutrophils Relative 64 %      Immat GRANS % 0 %      Lymphocytes Relative 21 %      Monocytes Relative 10 %      Eosinophils Relative 4 %      Basophils Relative 1 %      Neutrophils Absolute 6 53 Thousands/µL      Immature Grans Absolute 0 02 Thousand/uL      Lymphocytes Absolute 2 08 Thousands/µL      Monocytes Absolute 1 04 Thousand/µL      Eosinophils Absolute 0 40 Thousand/µL      Basophils Absolute 0 10 Thousands/µL                  XR chest 2 views   ED Interpretation by Adi Colorado PA-C (08/20 1816)   No acute cardiopulmonary disease, no interval change                 Procedures  ECG 12 Lead Documentation Only  Date/Time: 8/20/2019 5:52 PM  Performed by: Adi Colorado PA-C  Authorized by: Adi Colorado PA-C     Indications / Diagnosis:  Chest pain  ECG reviewed by me, the ED Provider: yes    Patient location:  ED  Previous ECG:     Previous ECG:  Compared to current    Similarity:  No change  Interpretation:     Interpretation: normal    Rate:     ECG rate:  72    ECG rate assessment: normal    Rhythm:     Rhythm: sinus rhythm    Ectopy:     Ectopy: none    QRS:     QRS axis:  Normal    QRS intervals: Wide  Conduction:     Conduction: abnormal      Abnormal conduction: complete LBBB    ST segments:     ST segments:  Normal  T waves:     T waves: normal             ED Course         HEART Risk Score      Most Recent Value   History  1 Filed at: 08/20/2019 1827   ECG  0 Filed at: 08/20/2019 1827   Age  2 Filed at: 08/20/2019 1827   Risk Factors  2 Filed at: 08/20/2019 1827   Troponin  0 Filed at: 08/20/2019 1827   Heart Score Risk Calculator   History  1 Filed at: 08/20/2019 1827   ECG  0 Filed at: 08/20/2019 1827   Age  2 Filed at: 08/20/2019 1827   Risk Factors  2 Filed at: 08/20/2019 1827   Troponin  0 Filed at: 08/20/2019 1827   HEART Score  5 Filed at: 08/20/2019 1827   HEART Score  5 Filed at: 08/20/2019 1827                            MDM  Number of Diagnoses or Management Options  Acute diastolic congestive heart failure [facility]): new and requires workup  Chest pain: new and requires workup  Diagnosis management comments: 67 yo male presents w/ acute chest pain and KO[facility] secondary to [facility]  He appears mildly hypervolemic on exam however without rales or crackles on lung exam  Ambulatory oxygen saturation stable >95% without significant dyspnea  BP improved after IV hydralazine in the ED, which also improved symptoms  Will discharge on furosemide 40mg bid, recommend close PCP and cardiology follow up for further management          Amount and/or Complexity of Data Reviewed  Clinical lab tests: ordered and reviewed  Tests in the radiology section of CPT®: ordered and reviewed  Tests in the medicine section of CPT®: ordered and reviewed  Review and summarize past medical records: yes  Discuss the patient with other providers: yes  Independent visualization of images, tracings, or specimens: yes        Disposition  Final diagnoses:   Acute diastolic congestive heart failure ([address] )   Chest pain     Time reflects when diagnosis was documented in both MDM as applicable and the Disposition within this note Time User Action Codes Description Comment    8/20/2019  6:58 PM Mika Irene Add [M56 30] Acute diastolic congestive heart failure ([address] )     8/20/2019  6:58 PM Mika Irene Add [R07 9] Chest pain       ED Disposition     ED Disposition Condition Date/Time Comment    Discharge Stable Tue Aug 20, 2019  6:58 PM Dung Lively discharge to home/self care  Follow-up Information     Follow up With Specialties Details Why Obdulio Metz DO Internal Medicine, Family Medicine In 1 week  [place] [id number] [address]      Joce Baptiste MD Cardiology In 1 week  [address]  [phone]            Discharge Medication List as of 8/20/2019  7:02 PM      START taking these medications    Details   furosemide (LASIX) 40 mg tablet Take 1 tablet (40 mg total) by mouth 2 (two) times a day, Starting Tue 8/20/2019, Print         CONTINUE these medications which have NOT CHANGED    Details   aspirin 81 MG tablet Take 1 tablet (81 mg total) by mouth daily, Starting Fri 3/29/2019, Historical Med      lisinopril (ZESTRIL) 10 mg tablet TAKE 1 TABLET BY MOUTH EVERY DAY, Normal      metFORMIN (GLUCOPHAGE-XR) 750 mg 24 hr tablet Take 1 tablet (750 mg total) by mouth daily with dinner for 30 days, Starting Wed 1/9/2019, Until Tue 8/20/2019, Normal      metoprolol succinate (TOPROL-XL) 100 mg 24 hr tablet Take 100 mg by mouth daily , Starting Thu 10/18/2018, Historical Med      Multiple Vitamin (MULTI-VITAMIN) tablet Take 1 tablet by mouth daily, Starting Fri 1/4/2019, Normal      nitroglycerin (NITROSTAT) 0 3 mg SL tablet Nitrostat 0 3 mg sublingual tablet   Place 1 tablet as needed by sublingual route as needed for 90 days  , Historical Med      omeprazole (PRILOSEC) 20 mg delayed release capsule Take 1 capsule by mouth Daily, Starting Fri 9/14/2012, Historical Med      pravastatin (PRAVACHOL) 40 mg tablet TAKE 1 TABLET BY MOUTH AT BEDTIME, Normal      ascorbic acid (VITAMIN C) 500 mg tablet Take 1 tablet (500 mg total) by mouth 2 (two) times a day, Starting Fri 1/4/2019, Normal      Multiple Vitamins-Minerals (ICAPS AREDS 2) CAPS Take 1 capsule by mouth daily, Historical Med           No discharge procedures on file      ED Provider  Electronically Signed by           Jerica Desai PA-C  08/20/19 1937

## 2019-08-20 NOTE — ED NOTES
Patient ambulated in hallway  O2 sat 96-98% and HR 76-80  No gait disturbances noted  PAC notified        Beth Fernandes RN  08/20/19 [id number]

## 2019-08-20 NOTE — DISCHARGE INSTRUCTIONS
Weigh yourself daily, if you gain more than 5 lbs in one week please return to the Emergency Department  Schedule a follow up with your cardiologist as well as with your Primary care doctor for further management

## 2019-08-21 ENCOUNTER — VBI (OUTPATIENT)
Dept: ADMINISTRATIVE | Facility: OTHER | Age: 74
End: 2019-08-21

## 2019-08-21 LAB
ATRIAL RATE: 74 BPM
QRS AXIS: -26 DEGREES
QRSD INTERVAL: 126 MS
QT INTERVAL: 456 MS
QTC INTERVAL: 499 MS
T WAVE AXIS: 76 DEGREES
VENTRICULAR RATE: 72 BPM

## 2019-08-21 PROCEDURE — 93010 ELECTROCARDIOGRAM REPORT: CPT | Performed by: INTERNAL MEDICINE

## 2019-08-21 NOTE — TELEPHONE ENCOUNTER
Amita Calderón    ED Visit Information     Ed visit date: 8/2082019  Diagnosis Description: Acute diastolic congestive heart failure ([facility] [address] ); Chest pain  In Network? Yes [address]  Discharge status: Home  Discharged with meds ? Yes  Number of ED visits to date: 4 (2 ED to hospital admissions)  ED Severity:n/a     Outreach Information    Outreach successful: Yes 1  Date letter mailed:n/a  Date Finalized:8/21/2019    Care Coordination    Follow up appointment with pcp: yes Qa scheuled f/u appt 9/6/2019  Transportation issues ? No    Value [employer] type:  7 Day Outreach  Emergent necessity warranted by diagnosis:  No  [facility] PCP:  Yes  Transportation:  Friend/Family Transport  Called PCP first?:  No  Feels able to call PCP for urgent problems ?:  Yes  Understands what emergencies can be handled by PCP ?:  Yes  Ever any problems getting appointment with PCP for minor emergency/urgency problems?:  Yes  Practice Contacted Patient ?:  No  Pt had ED follow up with pcp/staff ?:  No    Seen for follow-up out of network ?:  No  Reason Patient went to ED instead of Urgent Care or PCP?:  Perceived Severity of Illness  Urgent care Education?:  Yes  08/21/2019 04:14 PM Phone (Samantha Lisandra Erp ([facility]) [phone] (H)   Call Complete  Personal communication with patient regarding recent ED visit on 8/20 for Acute diastolic congestive heart failure ([facility] [address] ); Chest pain  Patient was discharged with medication (furosemide) and advised to follow up with PCP and cardiology within 1 week  Patient stated that he is feeling much better today and has not had not had any chest pain  He is scheduled to see cardiology on 9/13/2019 and stated that he was told that he needed a follow up with PCP to get a refill of furosemide  I was able to scheduled him with Dr Blas Jacobson on 09/06 at 11:00 a m  Patient was advised to arrival 15 minutes prior to appt per office protocol   Due to medical history patient's name will be forwarded to [facility] for review  Patient is aware of PCP on-call service, his nearest [place] [address] urgent care facility and what conditions may be treated there

## 2019-08-23 ENCOUNTER — TELEPHONE (OUTPATIENT)
Dept: CARDIOLOGY CLINIC | Facility: CLINIC | Age: 74
End: 2019-08-23

## 2019-08-23 NOTE — TELEPHONE ENCOUNTER
NP saw pt at home today  He had gone to the ER on Tuesday for HTN/ CP( 196/98)  They started him on lasix 40 mg bid  He has lost 14 lbs and /73  She will order him a BMP          Thank you

## 2019-09-06 ENCOUNTER — OFFICE VISIT (OUTPATIENT)
Dept: FAMILY MEDICINE CLINIC | Facility: HOSPITAL | Age: 74
End: 2019-09-06
Payer: COMMERCIAL

## 2019-09-06 VITALS
DIASTOLIC BLOOD PRESSURE: 70 MMHG | HEART RATE: 66 BPM | WEIGHT: 162 LBS | TEMPERATURE: 97.8 F | BODY MASS INDEX: 22.68 KG/M2 | SYSTOLIC BLOOD PRESSURE: 118 MMHG | HEIGHT: 71 IN

## 2019-09-06 DIAGNOSIS — I10 ESSENTIAL HYPERTENSION: ICD-10-CM

## 2019-09-06 DIAGNOSIS — I50.33 ACUTE ON CHRONIC DIASTOLIC CONGESTIVE HEART FAILURE (HCC): Primary | ICD-10-CM

## 2019-09-06 DIAGNOSIS — R07.9 CHEST PAIN: ICD-10-CM

## 2019-09-06 PROCEDURE — 99214 OFFICE O/P EST MOD 30 MIN: CPT | Performed by: INTERNAL MEDICINE

## 2019-09-06 RX ORDER — FUROSEMIDE 20 MG/1
20 TABLET ORAL 2 TIMES DAILY
Qty: 60 TABLET | Refills: 1 | Status: SHIPPED | OUTPATIENT
Start: 2019-09-06 | End: 2019-10-01 | Stop reason: SDUPTHER

## 2019-09-06 RX ORDER — LISINOPRIL 20 MG/1
20 TABLET ORAL DAILY
Qty: 90 TABLET | Refills: 1 | Status: SHIPPED | OUTPATIENT
Start: 2019-09-06 | End: 2020-03-02

## 2019-09-06 NOTE — PROGRESS NOTES
Assessment/Plan:    Acute on chronic diastolic congestive heart failure (HCC)  Wt Readings from Last 3 Encounters:   09/06/19 73 5 kg (162 lb)   08/20/19 79 4 kg (175 lb)   07/25/19 76 7 kg (169 lb 1 5 oz)     Has diuresed well and is down 17 lbs on his home scale, now appears a bit dry and has had some low BP and dizziness, BMP reviewed and no acute abnormality noted, will decrease Lasix to 20 mg bid and counseled pt on daily weights, low sodium diet and s/sx of HF, will recheck labs next week - has CMP order at home, has f/u with Cardio coming up, call with relapse in symptoms        Essential hypertension  Bp now improved with recent increase in lisinopril and addition of Lasix - no a bit dry and have symptomatic low Bp so will decrease lasix to 20 mg bid - has f/u with Cardio next week so will see pt at end of month after BW done - call with any con't low Bp or jump up in BP readings       Diagnoses and all orders for this visit:    Acute on chronic diastolic congestive heart failure (HCC)    Chest pain  Comments:  Resolved, no acute ischemic changes noted on ECG in ED, call with relapse in symptoms  Orders:  -     furosemide (LASIX) 20 mg tablet; Take 1 tablet (20 mg total) by mouth 2 (two) times a day    Essential hypertension  -     lisinopril (ZESTRIL) 20 mg tablet; Take 1 tablet (20 mg total) by mouth daily          Subjective:      Patient ID: Radha Sheppard is a 68 y o  male  HPI Pt here for ED follow up  Pt was seen at [facility] ED on 8/20/19 with c/o CP  He really did note significant SOB or wgt gain in addition  He did have LE edema for about 2 mos or so intermittently  In the ED BP was 198/95 and resp 20 with O2 sat 94%  HR nml at 75 and temp 98 5  Exam notable for +2 B/L LE edema  Labs were done and BNP was 6,069  Troponin was negative  CMP with elevated Alk Phos at 122 but rest of CMP was wnl  CBC with elevated WBC ct at 10 17 but rest of CBC was wnl    CXR showed trace pleural effusions  ECG read at LBBB but no acute ischemia  Pt was dx with acute HF and given a rx for Lasix 40 mg bid and discharged home  Visiting nurse saw pt 8/23/19 and BP was 127/73 and was documented to have a 14 wgt loss  BMP was ordered and results from 8/28/19 were reviewed - BUN/Cr 16/1 10 with GFR 67  K was normal as well  Echo 2/15/19 showed EF 60% and no regional wall motion abnormality  He has been doing much better since the lasix was started  He reports about 17 lb wgt loss this am   He notes the LE edema has resolved  He has had no further CP and denies SOB/orthopnea/PND/palp  He is taking his Lasix 40 mg bid  He has had some dizziness  He notes his BP is good and has even had some low readings  He states his average BP is mid 120's/upper 70's  He has BW to be done next week     Review of Systems   Constitutional: Negative for chills and fever  HENT: Negative for congestion and sinus pain  Eyes: Negative for pain, redness and visual disturbance  Respiratory: Negative for cough, shortness of breath and wheezing  Cardiovascular: Negative for chest pain, palpitations and leg swelling  Gastrointestinal: Negative for abdominal pain, constipation, diarrhea, nausea and vomiting  Genitourinary: Negative for difficulty urinating and dysuria  Musculoskeletal: Positive for arthralgias  Negative for myalgias  Skin: Negative for rash and wound  Neurological: Positive for light-headedness  Negative for dizziness and headaches  Hematological: Does not bruise/bleed easily  Psychiatric/Behavioral: Negative for behavioral problems and confusion  Objective:    /70 (BP Location: Right arm, Patient Position: Sitting, Cuff Size: Standard)   Pulse 66   Temp 97 8 °F (36 6 °C)   Ht 5' 11" (1 803 m)   Wt 73 5 kg (162 lb)   BMI 22 59 kg/m²      Physical Exam   Constitutional: He appears well-developed and well-nourished  No distress     HENT:   Head: Normocephalic and atraumatic  Mouth/Throat: No oropharyngeal exudate  Eyes: Conjunctivae are normal  Right eye exhibits no discharge  Left eye exhibits no discharge  Neck: Neck supple  No JVD present  No tracheal deviation present  Cardiovascular: Normal rate, regular rhythm and normal heart sounds  No murmur heard  Pulmonary/Chest: Effort normal and breath sounds normal  No respiratory distress  He has no wheezes  He has no rales  Abdominal: Soft  He exhibits no distension  There is no tenderness  There is no rebound and no guarding  Musculoskeletal: He exhibits no deformity  Neurological: He is alert  He exhibits normal muscle tone  Skin: Skin is warm and dry  No rash noted  Psychiatric: He has a normal mood and affect  His behavior is normal    Nursing note and vitals reviewed

## 2019-09-06 NOTE — ASSESSMENT & PLAN NOTE
Wt Readings from Last 3 Encounters:   09/06/19 73 5 kg (162 lb)   08/20/19 79 4 kg (175 lb)   07/25/19 76 7 kg (169 lb 1 5 oz)     Has diuresed well and is down 17 lbs on his home scale, now appears a bit dry and has had some low BP and dizziness, BMP reviewed and no acute abnormality noted, will decrease Lasix to 20 mg bid and counseled pt on daily weights, low sodium diet and s/sx of HF, will recheck labs next week - has CMP order at home, has f/u with Cardio coming up, call with relapse in symptoms

## 2019-09-06 NOTE — ASSESSMENT & PLAN NOTE
Bp now improved with recent increase in lisinopril and addition of Lasix - no a bit dry and have symptomatic low Bp so will decrease lasix to 20 mg bid - has f/u with Cardio next week so will see pt at end of month after BW done - call with any con't low Bp or jump up in BP readings

## 2019-09-11 ENCOUNTER — APPOINTMENT (EMERGENCY)
Dept: RADIOLOGY | Facility: HOSPITAL | Age: 74
DRG: 303 | End: 2019-09-11
Payer: COMMERCIAL

## 2019-09-11 ENCOUNTER — HOSPITAL ENCOUNTER (INPATIENT)
Facility: HOSPITAL | Age: 74
LOS: 1 days | Discharge: HOME | DRG: 303 | End: 2019-09-12
Attending: EMERGENCY MEDICINE | Admitting: INTERNAL MEDICINE
Payer: COMMERCIAL

## 2019-09-11 DIAGNOSIS — R07.9 CHEST PAIN: Primary | ICD-10-CM

## 2019-09-11 DIAGNOSIS — I10 ESSENTIAL HYPERTENSION: ICD-10-CM

## 2019-09-11 DIAGNOSIS — Z95.1 HX OF CABG: ICD-10-CM

## 2019-09-11 DIAGNOSIS — I48.0 PAROXYSMAL ATRIAL FIBRILLATION (HCC): ICD-10-CM

## 2019-09-11 DIAGNOSIS — I25.119 CORONARY ARTERY DISEASE INVOLVING NATIVE HEART WITH ANGINA PECTORIS, UNSPECIFIED VESSEL OR LESION TYPE (HCC): ICD-10-CM

## 2019-09-11 DIAGNOSIS — I50.32 CHRONIC DIASTOLIC CONGESTIVE HEART FAILURE (HCC): ICD-10-CM

## 2019-09-11 DIAGNOSIS — I50.9 CHF EXACERBATION (HCC): ICD-10-CM

## 2019-09-11 DIAGNOSIS — E78.5 DYSLIPIDEMIA: ICD-10-CM

## 2019-09-11 DIAGNOSIS — Z95.3 HISTORY OF AORTIC VALVE REPLACEMENT WITH BIOPROSTHETIC VALVE: Chronic | ICD-10-CM

## 2019-09-11 PROBLEM — R07.89 CHEST PRESSURE: Status: ACTIVE | Noted: 2019-09-11

## 2019-09-11 LAB
ALBUMIN SERPL BCP-MCNC: 4.3 G/DL (ref 3.5–5)
ALP SERPL-CCNC: 121 U/L (ref 46–116)
ALT SERPL W P-5'-P-CCNC: 19 U/L (ref 12–78)
ANION GAP SERPL CALCULATED.3IONS-SCNC: 7 MMOL/L (ref 4–13)
AST SERPL W P-5'-P-CCNC: 15 U/L (ref 5–45)
BASOPHILS # BLD AUTO: 0.11 THOUSANDS/ΜL (ref 0–0.1)
BASOPHILS NFR BLD AUTO: 1 % (ref 0–1)
BILIRUB SERPL-MCNC: 0.5 MG/DL (ref 0.2–1)
BUN SERPL-MCNC: 18 MG/DL (ref 5–25)
CALCIUM SERPL-MCNC: 9.5 MG/DL (ref 8.3–10.1)
CHLORIDE SERPL-SCNC: 103 MMOL/L (ref 100–108)
CO2 SERPL-SCNC: 34 MMOL/L (ref 21–32)
CREAT SERPL-MCNC: 1.12 MG/DL (ref 0.6–1.3)
EOSINOPHIL # BLD AUTO: 0.4 THOUSAND/ΜL (ref 0–0.61)
EOSINOPHIL NFR BLD AUTO: 4 % (ref 0–6)
ERYTHROCYTE [DISTWIDTH] IN BLOOD BY AUTOMATED COUNT: 14.9 % (ref 11.6–15.1)
GFR SERPL CREATININE-BSD FRML MDRD: 65 ML/MIN/1.73SQ M
GLUCOSE SERPL-MCNC: 104 MG/DL (ref 65–140)
GLUCOSE SERPL-MCNC: 95 MG/DL (ref 65–140)
HCT VFR BLD AUTO: 41 % (ref 36.5–49.3)
HGB BLD-MCNC: 12.9 G/DL (ref 12–17)
IMM GRANULOCYTES # BLD AUTO: 0.05 THOUSAND/UL (ref 0–0.2)
IMM GRANULOCYTES NFR BLD AUTO: 1 % (ref 0–2)
LYMPHOCYTES # BLD AUTO: 2.24 THOUSANDS/ΜL (ref 0.6–4.47)
LYMPHOCYTES NFR BLD AUTO: 24 % (ref 14–44)
MCH RBC QN AUTO: 26 PG (ref 26.8–34.3)
MCHC RBC AUTO-ENTMCNC: 31.5 G/DL (ref 31.4–37.4)
MCV RBC AUTO: 83 FL (ref 82–98)
MONOCYTES # BLD AUTO: 0.82 THOUSAND/ΜL (ref 0.17–1.22)
MONOCYTES NFR BLD AUTO: 9 % (ref 4–12)
NEUTROPHILS # BLD AUTO: 5.82 THOUSANDS/ΜL (ref 1.85–7.62)
NEUTS SEG NFR BLD AUTO: 61 % (ref 43–75)
NRBC BLD AUTO-RTO: 0 /100 WBCS
NT-PROBNP SERPL-MCNC: 1589 PG/ML
PLATELET # BLD AUTO: 248 THOUSANDS/UL (ref 149–390)
PMV BLD AUTO: 9.9 FL (ref 8.9–12.7)
POTASSIUM SERPL-SCNC: 3.8 MMOL/L (ref 3.5–5.3)
PROT SERPL-MCNC: 7.2 G/DL (ref 6.4–8.2)
RBC # BLD AUTO: 4.96 MILLION/UL (ref 3.88–5.62)
SODIUM SERPL-SCNC: 144 MMOL/L (ref 136–145)
TROPONIN I SERPL-MCNC: <0.02 NG/ML
WBC # BLD AUTO: 9.44 THOUSAND/UL (ref 4.31–10.16)

## 2019-09-11 PROCEDURE — 36415 COLL VENOUS BLD VENIPUNCTURE: CPT | Performed by: EMERGENCY MEDICINE

## 2019-09-11 PROCEDURE — 85025 COMPLETE CBC W/AUTO DIFF WBC: CPT | Performed by: EMERGENCY MEDICINE

## 2019-09-11 PROCEDURE — 84484 ASSAY OF TROPONIN QUANT: CPT | Performed by: EMERGENCY MEDICINE

## 2019-09-11 PROCEDURE — 99285 EMERGENCY DEPT VISIT HI MDM: CPT | Performed by: EMERGENCY MEDICINE

## 2019-09-11 PROCEDURE — 93005 ELECTROCARDIOGRAM TRACING: CPT

## 2019-09-11 PROCEDURE — 82948 REAGENT STRIP/BLOOD GLUCOSE: CPT

## 2019-09-11 PROCEDURE — 84484 ASSAY OF TROPONIN QUANT: CPT | Performed by: NURSE PRACTITIONER

## 2019-09-11 PROCEDURE — 99220 PR INITIAL OBSERVATION CARE/DAY 70 MINUTES: CPT | Performed by: NURSE PRACTITIONER

## 2019-09-11 PROCEDURE — 80053 COMPREHEN METABOLIC PANEL: CPT | Performed by: EMERGENCY MEDICINE

## 2019-09-11 PROCEDURE — 99285 EMERGENCY DEPT VISIT HI MDM: CPT

## 2019-09-11 PROCEDURE — 71046 X-RAY EXAM CHEST 2 VIEWS: CPT

## 2019-09-11 PROCEDURE — 83880 ASSAY OF NATRIURETIC PEPTIDE: CPT | Performed by: EMERGENCY MEDICINE

## 2019-09-11 RX ORDER — MULTIVITAMIN/IRON/FOLIC ACID 18MG-0.4MG
1 TABLET ORAL DAILY
Status: DISCONTINUED | OUTPATIENT
Start: 2019-09-12 | End: 2019-09-12 | Stop reason: HOSPADM

## 2019-09-11 RX ORDER — FUROSEMIDE 20 MG/1
20 TABLET ORAL 2 TIMES DAILY
Status: DISCONTINUED | OUTPATIENT
Start: 2019-09-11 | End: 2019-09-12 | Stop reason: HOSPADM

## 2019-09-11 RX ORDER — LISINOPRIL 20 MG/1
20 TABLET ORAL DAILY
Status: DISCONTINUED | OUTPATIENT
Start: 2019-09-12 | End: 2019-09-12 | Stop reason: HOSPADM

## 2019-09-11 RX ORDER — PRAVASTATIN SODIUM 40 MG
40 TABLET ORAL
Status: DISCONTINUED | OUTPATIENT
Start: 2019-09-11 | End: 2019-09-12 | Stop reason: HOSPADM

## 2019-09-11 RX ORDER — METOPROLOL SUCCINATE 50 MG/1
100 TABLET, EXTENDED RELEASE ORAL DAILY
Status: DISCONTINUED | OUTPATIENT
Start: 2019-09-12 | End: 2019-09-12 | Stop reason: HOSPADM

## 2019-09-11 RX ORDER — PANTOPRAZOLE SODIUM 40 MG/1
40 TABLET, DELAYED RELEASE ORAL
Status: DISCONTINUED | OUTPATIENT
Start: 2019-09-12 | End: 2019-09-12 | Stop reason: HOSPADM

## 2019-09-11 RX ORDER — ONDANSETRON 2 MG/ML
4 INJECTION INTRAMUSCULAR; INTRAVENOUS EVERY 6 HOURS PRN
Status: DISCONTINUED | OUTPATIENT
Start: 2019-09-11 | End: 2019-09-12 | Stop reason: HOSPADM

## 2019-09-11 RX ORDER — ASPIRIN 81 MG/1
81 TABLET ORAL DAILY
Status: DISCONTINUED | OUTPATIENT
Start: 2019-09-12 | End: 2019-09-12 | Stop reason: HOSPADM

## 2019-09-11 RX ORDER — METFORMIN HYDROCHLORIDE 750 MG/1
750 TABLET, EXTENDED RELEASE ORAL
Status: DISCONTINUED | OUTPATIENT
Start: 2019-09-12 | End: 2019-09-12

## 2019-09-12 ENCOUNTER — APPOINTMENT (INPATIENT)
Dept: NON INVASIVE DIAGNOSTICS | Facility: HOSPITAL | Age: 74
DRG: 303 | End: 2019-09-12
Payer: COMMERCIAL

## 2019-09-12 ENCOUNTER — APPOINTMENT (INPATIENT)
Dept: NUCLEAR MEDICINE | Facility: HOSPITAL | Age: 74
DRG: 303 | End: 2019-09-12
Payer: COMMERCIAL

## 2019-09-12 VITALS
HEIGHT: 71 IN | HEART RATE: 64 BPM | SYSTOLIC BLOOD PRESSURE: 146 MMHG | TEMPERATURE: 98 F | DIASTOLIC BLOOD PRESSURE: 71 MMHG | BODY MASS INDEX: 22.19 KG/M2 | RESPIRATION RATE: 17 BRPM | WEIGHT: 158.51 LBS | OXYGEN SATURATION: 97 %

## 2019-09-12 PROBLEM — I20.0 UNSTABLE ANGINA PECTORIS (HCC): Status: ACTIVE | Noted: 2019-09-12

## 2019-09-12 LAB
ANION GAP SERPL CALCULATED.3IONS-SCNC: 6 MMOL/L (ref 4–13)
ATRIAL RATE: 625 BPM
ATRIAL RATE: 68 BPM
ATRIAL RATE: 68 BPM
ATRIAL RATE: 70 BPM
BASOPHILS # BLD AUTO: 0.09 THOUSANDS/ΜL (ref 0–0.1)
BASOPHILS NFR BLD AUTO: 1 % (ref 0–1)
BUN SERPL-MCNC: 17 MG/DL (ref 5–25)
CALCIUM SERPL-MCNC: 9.2 MG/DL (ref 8.3–10.1)
CHEST PAIN STATEMENT: NORMAL
CHLORIDE SERPL-SCNC: 105 MMOL/L (ref 100–108)
CHOLEST SERPL-MCNC: 93 MG/DL (ref 50–200)
CO2 SERPL-SCNC: 33 MMOL/L (ref 21–32)
CREAT SERPL-MCNC: 1.05 MG/DL (ref 0.6–1.3)
EOSINOPHIL # BLD AUTO: 0.38 THOUSAND/ΜL (ref 0–0.61)
EOSINOPHIL NFR BLD AUTO: 4 % (ref 0–6)
ERYTHROCYTE [DISTWIDTH] IN BLOOD BY AUTOMATED COUNT: 14.8 % (ref 11.6–15.1)
GFR SERPL CREATININE-BSD FRML MDRD: 71 ML/MIN/1.73SQ M
GLUCOSE SERPL-MCNC: 102 MG/DL (ref 65–140)
GLUCOSE SERPL-MCNC: 109 MG/DL (ref 65–140)
GLUCOSE SERPL-MCNC: 89 MG/DL (ref 65–140)
HCT VFR BLD AUTO: 37.2 % (ref 36.5–49.3)
HDLC SERPL-MCNC: 31 MG/DL (ref 40–60)
HGB BLD-MCNC: 11.9 G/DL (ref 12–17)
IMM GRANULOCYTES # BLD AUTO: 0.04 THOUSAND/UL (ref 0–0.2)
IMM GRANULOCYTES NFR BLD AUTO: 1 % (ref 0–2)
LDLC SERPL CALC-MCNC: 44 MG/DL (ref 0–100)
LYMPHOCYTES # BLD AUTO: 2.09 THOUSANDS/ΜL (ref 0.6–4.47)
LYMPHOCYTES NFR BLD AUTO: 24 % (ref 14–44)
MAGNESIUM SERPL-MCNC: 1.7 MG/DL (ref 1.6–2.6)
MAX DIASTOLIC BP: 79 MMHG
MAX HEART RATE: 79 BPM
MAX PREDICTED HEART RATE: 148 BPM
MAX. SYSTOLIC BP: 166 MMHG
MCH RBC QN AUTO: 26.6 PG (ref 26.8–34.3)
MCHC RBC AUTO-ENTMCNC: 32 G/DL (ref 31.4–37.4)
MCV RBC AUTO: 83 FL (ref 82–98)
MONOCYTES # BLD AUTO: 0.71 THOUSAND/ΜL (ref 0.17–1.22)
MONOCYTES NFR BLD AUTO: 8 % (ref 4–12)
NEUTROPHILS # BLD AUTO: 5.57 THOUSANDS/ΜL (ref 1.85–7.62)
NEUTS SEG NFR BLD AUTO: 62 % (ref 43–75)
NRBC BLD AUTO-RTO: 0 /100 WBCS
P AXIS: 83 DEGREES
PLATELET # BLD AUTO: 215 THOUSANDS/UL (ref 149–390)
PMV BLD AUTO: 10.3 FL (ref 8.9–12.7)
POTASSIUM SERPL-SCNC: 3.4 MMOL/L (ref 3.5–5.3)
PR INTERVAL: 206 MS
PROTOCOL NAME: NORMAL
QRS AXIS: -44 DEGREES
QRS AXIS: -49 DEGREES
QRS AXIS: -49 DEGREES
QRS AXIS: -50 DEGREES
QRSD INTERVAL: 130 MS
QRSD INTERVAL: 132 MS
QRSD INTERVAL: 134 MS
QRSD INTERVAL: 134 MS
QT INTERVAL: 452 MS
QT INTERVAL: 456 MS
QT INTERVAL: 466 MS
QT INTERVAL: 474 MS
QTC INTERVAL: 484 MS
QTC INTERVAL: 488 MS
QTC INTERVAL: 492 MS
QTC INTERVAL: 496 MS
RBC # BLD AUTO: 4.47 MILLION/UL (ref 3.88–5.62)
REASON FOR TERMINATION: NORMAL
SODIUM SERPL-SCNC: 144 MMOL/L (ref 136–145)
T WAVE AXIS: 62 DEGREES
T WAVE AXIS: 65 DEGREES
T WAVE AXIS: 69 DEGREES
T WAVE AXIS: 82 DEGREES
TARGET HR FORMULA: NORMAL
TEST INDICATION: NORMAL
TIME IN EXERCISE PHASE: NORMAL
TRIGL SERPL-MCNC: 88 MG/DL
TROPONIN I SERPL-MCNC: <0.02 NG/ML
TROPONIN I SERPL-MCNC: <0.02 NG/ML
VENTRICULAR RATE: 66 BPM
VENTRICULAR RATE: 67 BPM
VENTRICULAR RATE: 68 BPM
VENTRICULAR RATE: 70 BPM
WBC # BLD AUTO: 8.88 THOUSAND/UL (ref 4.31–10.16)

## 2019-09-12 PROCEDURE — 84484 ASSAY OF TROPONIN QUANT: CPT | Performed by: NURSE PRACTITIONER

## 2019-09-12 PROCEDURE — 99239 HOSP IP/OBS DSCHRG MGMT >30: CPT | Performed by: INTERNAL MEDICINE

## 2019-09-12 PROCEDURE — A9502 TC99M TETROFOSMIN: HCPCS

## 2019-09-12 PROCEDURE — 80048 BASIC METABOLIC PNL TOTAL CA: CPT | Performed by: NURSE PRACTITIONER

## 2019-09-12 PROCEDURE — 93010 ELECTROCARDIOGRAM REPORT: CPT | Performed by: INTERNAL MEDICINE

## 2019-09-12 PROCEDURE — 80061 LIPID PANEL: CPT | Performed by: NURSE PRACTITIONER

## 2019-09-12 PROCEDURE — 78452 HT MUSCLE IMAGE SPECT MULT: CPT

## 2019-09-12 PROCEDURE — 83735 ASSAY OF MAGNESIUM: CPT | Performed by: NURSE PRACTITIONER

## 2019-09-12 PROCEDURE — 82948 REAGENT STRIP/BLOOD GLUCOSE: CPT

## 2019-09-12 PROCEDURE — 85025 COMPLETE CBC W/AUTO DIFF WBC: CPT | Performed by: NURSE PRACTITIONER

## 2019-09-12 PROCEDURE — 93018 CV STRESS TEST I&R ONLY: CPT | Performed by: INTERNAL MEDICINE

## 2019-09-12 PROCEDURE — 93005 ELECTROCARDIOGRAM TRACING: CPT

## 2019-09-12 PROCEDURE — NC001 PR NO CHARGE: Performed by: INTERNAL MEDICINE

## 2019-09-12 PROCEDURE — 93017 CV STRESS TEST TRACING ONLY: CPT

## 2019-09-12 PROCEDURE — 78452 HT MUSCLE IMAGE SPECT MULT: CPT | Performed by: INTERNAL MEDICINE

## 2019-09-12 PROCEDURE — 93016 CV STRESS TEST SUPVJ ONLY: CPT | Performed by: INTERNAL MEDICINE

## 2019-09-12 RX ORDER — METOPROLOL TARTRATE 5 MG/5ML
5 INJECTION INTRAVENOUS EVERY 6 HOURS PRN
Status: DISCONTINUED | OUTPATIENT
Start: 2019-09-12 | End: 2019-09-12 | Stop reason: HOSPADM

## 2019-09-12 RX ORDER — NITROGLYCERIN 0.4 MG/1
0.4 TABLET SUBLINGUAL
Status: DISCONTINUED | OUTPATIENT
Start: 2019-09-12 | End: 2019-09-12 | Stop reason: HOSPADM

## 2019-09-12 RX ADMIN — REGADENOSON 0.4 MG: 0.08 INJECTION, SOLUTION INTRAVENOUS at 12:16

## 2019-09-12 RX ADMIN — FUROSEMIDE 20 MG: 20 TABLET ORAL at 08:43

## 2019-09-12 RX ADMIN — PANTOPRAZOLE SODIUM 40 MG: 40 TABLET, DELAYED RELEASE ORAL at 05:48

## 2019-09-12 RX ADMIN — METOPROLOL SUCCINATE 100 MG: 50 TABLET, EXTENDED RELEASE ORAL at 08:43

## 2019-09-12 RX ADMIN — ASPIRIN 81 MG: 81 TABLET, COATED ORAL at 08:43

## 2019-09-12 RX ADMIN — Medication 1 TABLET: at 08:44

## 2019-09-12 RX ADMIN — ENOXAPARIN SODIUM 40 MG: 100 INJECTION SUBCUTANEOUS at 08:44

## 2019-09-12 RX ADMIN — LISINOPRIL 20 MG: 20 TABLET ORAL at 08:43

## 2019-09-12 NOTE — PROGRESS NOTES
Patient known to me in the office  68year old male has had intermittent atypical chest pain for one year  More frequent this week  I saw him in the stress lab this am  Chest discomfort is lower sternum, reproducible with inspiration  EKG: NSR IVCD  Troponin negative  SPECT shows fixed apical / inferoapical defect c/w with prior stress studies done at [facility]  He has an appointment with me in the office tomorrow so will do formal consult in the office  He can be discharged from my perspective

## 2019-09-12 NOTE — ASSESSMENT & PLAN NOTE
Patient had bioprosthetic aortic valve replacement in 2014 at [facility]    Currently on 81 mg aspirin  Patient should also be on Coumadin given valve replacement with a target INR of 2 5  Would discuss with patient's primary cardiologist Dr Willie Marcos about anticoagulation

## 2019-09-12 NOTE — ASSESSMENT & PLAN NOTE
Wt Readings from Last 3 Encounters:   09/11/19 75 6 kg (166 lb 10 7 oz)   09/06/19 73 5 kg (162 lb)   08/20/19 79 4 kg (175 lb)       History of chronic diastolic heart failure, EF 60%  · Not in acute CHF exacerbation  · Continue with home Lasix 20 mg b i d   · Monitor I/O and daily weights

## 2019-09-12 NOTE — PLAN OF CARE
Problem: Nutrition/Hydration-ADULT  Goal: Nutrient/Hydration intake appropriate for improving, restoring or maintaining nutritional needs  Description  Monitor and assess patient's nutrition/hydration status for malnutrition  Collaborate with interdisciplinary team and initiate plan and interventions as ordered  Monitor patient's weight and dietary intake as ordered or per policy  Utilize nutrition screening tool and intervene as necessary  Determine patient's food preferences and provide high-protein, high-caloric foods as appropriate       INTERVENTIONS:  - Monitor oral intake, urinary output, labs, and treatment plans  - Assess nutrition and hydration status and recommend course of action  - Evaluate amount of meals eaten  - Assist patient with eating if necessary   - Allow adequate time for meals  - Recommend/ encourage appropriate diets, oral nutritional supplements, and vitamin/mineral supplements  - Order, calculate, and assess calorie counts as needed  - Recommend, monitor, and adjust tube feedings and TPN/PPN based on assessed needs  - Assess need for intravenous fluids  - Provide specific nutrition/hydration education as appropriate  - Include patient/family/caregiver in decisions related to nutrition  Outcome: Progressing     Problem: CARDIOVASCULAR - ADULT  Goal: Maintains optimal cardiac output and hemodynamic stability  Description  INTERVENTIONS:  - Monitor I/O, vital signs and rhythm  - Monitor for S/S and trends of decreased cardiac output  - Administer and titrate ordered vasoactive medications to optimize hemodynamic stability  - Assess quality of pulses, skin color and temperature  - Assess for signs of decreased coronary artery perfusion  - Instruct patient to report change in severity of symptoms  Outcome: Progressing  Goal: Absence of cardiac dysrhythmias or at baseline rhythm  Description  INTERVENTIONS:  - Continuous cardiac monitoring, vital signs, obtain 12 lead EKG if ordered  - Administer antiarrhythmic and heart rate control medications as ordered  - Monitor electrolytes and administer replacement therapy as ordered  Outcome: Progressing     Problem: PAIN - ADULT  Goal: Verbalizes/displays adequate comfort level or baseline comfort level  Description  Interventions:  - Encourage patient to monitor pain and request assistance  - Assess pain using appropriate pain scale  - Administer analgesics based on type and severity of pain and evaluate response  - Implement non-pharmacological measures as appropriate and evaluate response  - Consider cultural and social influences on pain and pain management  - Notify physician/advanced practitioner if interventions unsuccessful or patient reports new pain  Outcome: Progressing     Problem: SAFETY ADULT  Goal: Patient will remain free of falls  Description  INTERVENTIONS:  - Assess patient frequently for physical needs  -  Identify cognitive and physical deficits and behaviors that affect risk of falls    -  [place] fall precautions as indicated by assessment   - Educate patient/family on patient safety including physical limitations  - Instruct patient to call for assistance with activity based on assessment  - Modify environment to reduce risk of injury  - Consider OT/PT consult to assist with strengthening/mobility  Outcome: Progressing     Problem: DISCHARGE PLANNING  Goal: Discharge to home or other facility with appropriate resources  Description  INTERVENTIONS:  - Identify barriers to discharge w/patient and caregiver  - Arrange for needed discharge resources and transportation as appropriate  - Identify discharge learning needs (meds, wound care, etc )  - Arrange for interpretive services to assist at discharge as needed  - Refer to Case Management Department for coordinating discharge planning if the patient needs post-hospital services based on physician/advanced practitioner order or complex needs related to functional status, cognitive ability, or social support system  Outcome: Progressing     Problem: Knowledge Deficit  Goal: Patient/family/caregiver demonstrates understanding of disease process, treatment plan, medications, and discharge instructions  Description  Complete learning assessment and assess knowledge base    Interventions:  - Provide teaching at level of understanding  - Provide teaching via preferred learning methods  Outcome: Progressing

## 2019-09-12 NOTE — DISCHARGE INSTRUCTIONS
Angina   WHAT YOU NEED TO KNOW:   Angina is pain, pressure, or tightness that is usually felt in your chest  Chest pain may come on when you are stressed or do physical activities, such as walking or exercising  Angina is caused by decreased blood flow and oxygen to your heart  These are often caused by atherosclerosis (hardening of the arteries)  If left untreated, angina may get worse, increase your risk for a heart attack, or become life-threatening  DISCHARGE INSTRUCTIONS:   Call 911 if:   · You have any of the following signs of a heart attack:      ¨ Squeezing, pressure, or pain in your chest that lasts longer than 5 minutes or returns    ¨ Discomfort or pain in your back, neck, jaw, stomach, or arm     ¨ Trouble breathing    ¨ Nausea or vomiting    ¨ Lightheadedness or a sudden cold sweat, especially with chest pain or trouble breathing    · You have chest pain that does not go away after you take medicine as directed  · You lose feeling in your face, arms, or legs, or you suddenly feel weak  Seek care immediately if:   · Your angina is happening more frequently, lasting longer, or causing worse pain  Contact your healthcare provider if:   · You are dizzy or nauseated after you take your medicine  · You have shortness of breath at rest     · You have new or worse swelling in your feet or ankles  · You have questions or concerns about your condition or care  Medicines: You may need any of the following:  · Antiplatelets , such as aspirin, help prevent blood clots  Take your antiplatelet medicine exactly as directed  These medicines make it more likely for you to bleed or bruise  If you are told to take aspirin, do not take acetaminophen or ibuprofen instead  · Blood thinners    help prevent blood clots  Examples of blood thinners include heparin and warfarin  Clots can cause strokes, heart attacks, and death   The following are general safety guidelines to follow while you are taking a blood thinner:    ¨ Watch for bleeding and bruising while you take blood thinners  Watch for bleeding from your gums or nose  Watch for blood in your urine and bowel movements  Use a soft washcloth on your skin, and a soft toothbrush to brush your teeth  This can keep your skin and gums from bleeding  If you shave, use an electric shaver  Do not play contact sports  ¨ Tell your dentist and other healthcare providers that you take anticoagulants  Wear a bracelet or necklace that says you take this medicine  ¨ Do not start or stop any medicines unless your healthcare provider tells you to  Many medicines cannot be used with blood thinners  ¨ Tell your healthcare provider right away if you forget to take the medicine, or if you take too much  ¨ Warfarin  is a blood thinner that you may need to take  The following are things you should be aware of if you take warfarin  § Foods and medicines can affect the amount of warfarin in your blood  Do not make major changes to your diet while you take warfarin  Warfarin works best when you eat about the same amount of vitamin K every day  Vitamin K is found in green leafy vegetables and certain other foods  Ask for more information about what to eat when you are taking warfarin  § You will need to see your healthcare provider for follow-up visits when you are on warfarin  You will need regular blood tests  These tests are used to decide how much medicine you need  · Other medicines  may be given to open the arteries to your heart, slow your heartbeat, or decrease your blood pressure or cholesterol  · Do not take certain medicines without asking your healthcare provider first   These include NSAIDs, herbal or vitamin supplements, or hormones (estrogen or progestin)  · Take your medicine as directed  Contact your healthcare provider if you think your medicine is not helping or if you have side effects   Tell him or her if you are allergic to any medicine  Keep a list of the medicines, vitamins, and herbs you take  Include the amounts, and when and why you take them  Bring the list or the pill bottles to follow-up visits  Carry your medicine list with you in case of an emergency  Follow up with your healthcare provider as directed:  Keep a record or a calendar with details about your chest pain  Every time you have pain or symptoms, record what the pain is like, how long it lasts, and how severe it is  Also record what you are doing when the pain starts, and what makes it go away  Bring this with you every time you see your healthcare provider  Write down your questions so you remember to ask them during your visits  Cardiac rehabilitation:  Your healthcare provider may recommend that you attend cardiac rehabilitation (rehab)  This is a program run by specialists who will help you safely strengthen your heart and prevent more heart disease  The plan includes exercise, relaxation, stress management, and heart-healthy nutrition  Healthcare providers will also check to make sure any medicines you are taking are working  The plan may also include instructions for when you can drive, return to work, and do other normal daily activities  Manage angina:   · Do not smoke  Nicotine and other chemicals in cigarettes and cigars can cause heart and lung damage  Ask your healthcare provider for information if you currently smoke and need help to quit  E-cigarettes or smokeless tobacco still contain nicotine  Talk to your healthcare provider before you use these products  · Maintain a healthy weight  When you weigh more than is healthy for you, your heart must work harder  You are at higher risk for serious health problems if you are overweight  Ask your healthcare provider how much you should weigh  Ask him to help you create a weight loss plan if you are overweight  · Ask about activity    Your healthcare provider will tell you which activities to limit or avoid  Ask about the best exercise plan for you  · Eat heart-healthy foods  Include fresh fruits and vegetables in your meal plan  Choose low-fat foods, such as skim or 1% fat milk, low-fat cheese and yogurt, fish, chicken (without skin), and lean meats  Eat two 4-ounce servings of fish high in omega-3 fats each week, such as salmon, fresh tuna, and herring  Do not eat foods that are high in sodium, such as canned foods, potato chips, salty snacks, and cold cuts  Put less table salt on your food  · Avoid activities that cause angina  Pay attention to your symptoms and find out what seems to make your angina worse  · Ask if you should have a flu vaccine  The flu vaccine will decrease your risk for an infection  An infection can put more stress on your heart and worsen your angina  © 2017 [employer] Information is for End User's use only and may not be sold, redistributed or otherwise used for commercial purposes  All illustrations and images included in CareNotes® are the copyrighted property of [employer]  or Drew Melendez  The above information is an  only  It is not intended as medical advice for individual conditions or treatments  Talk to your doctor, nurse or pharmacist before following any medical regimen to see if it is safe and effective for you

## 2019-09-12 NOTE — ASSESSMENT & PLAN NOTE
Wt Readings from Last 3 Encounters:   09/12/19 71 9 kg (158 lb 8 2 oz)   09/06/19 73 5 kg (162 lb)   08/20/19 79 4 kg (175 lb)       History of chronic diastolic heart failure, EF 60%  · Not in acute CHF exacerbation  · Continue with home Lasix 20 mg b i d   · Monitor I/O and daily weights

## 2019-09-12 NOTE — ASSESSMENT & PLAN NOTE
· Unstable angina in a patient with History of CAD s/p X CABG x 1, LIMA to LAD, as evidenced by complaint of chest pressure on exertion, improved by rest and nitro, troponins negative x3, EKG no new ST segment changes, now scheduled to undergo stress test  · Cardiology on board, appreciate recommendations

## 2019-09-12 NOTE — ASSESSMENT & PLAN NOTE
Patient has history of CABG in 2014 at [facility]    · Continue aspirin, BB and statin

## 2019-09-12 NOTE — ED PROVIDER NOTES
History  Chief Complaint   Patient presents with    Shortness of Breath     Patient presents to the ED stating he has been having chest pressure on the left side of chest and is having SOB  States it has been going on for 1 hour - started when watering the plants  States this is his 4th visit to the ED in a few weeks and has hx of CHF and open heart surgery for aortic valve replacement  68year old male presents for evaluation of shortness of breath and chest pain for the past hour  Chest pain is substernal and described as a pressure-like sensation  He states that the pain does not radiate and he has not noticed any exacerbating factors  He states the pain seems to improve slightly with rest  Patient reports that he has had similar symptoms in the past with prior cardiac disease and had undergone CABG  Over the past 3 weeks, he has had 4 episodes similar to this  Patient has chronic mild exertional dyspnea which he states is unchanged from baseline  Patient states he has been able to continue engaging in his normal activities  No recent illness  History provided by:  Patient  Chest Pain   Pain location:  Substernal area  Pain quality: pressure    Pain radiates to:  Does not radiate  Pain radiates to the back: no    Pain severity:  Mild  Onset quality:  Sudden  Duration:  1 hour  Timing:  Constant  Progression:  Improving  Chronicity:  Recurrent  Relieved by:  None tried  Worsened by:  Nothing tried  Ineffective treatments:  Rest  Associated symptoms: shortness of breath    Associated symptoms: no abdominal pain, no cough, no diaphoresis, no fatigue, no fever, no headache, no nausea, no palpitations, not vomiting and no weakness    Risk factors: coronary artery disease  Recent surgery: former smoker  Prior to Admission Medications   Prescriptions Last Dose Informant Patient Reported? Taking?    Multiple Vitamin (MULTI-VITAMIN) tablet  Self No No   Sig: Take 1 tablet by mouth daily   Multiple Vitamins-Minerals (ICAPS AREDS 2) CAPS  Self Yes No   Sig: Take 1 capsule by mouth daily   ascorbic acid (VITAMIN C) 500 mg tablet  Self No No   Sig: Take 1 tablet (500 mg total) by mouth 2 (two) times a day   Patient not taking: Reported on 8/20/2019   aspirin 81 MG tablet 9/11/2019 at Unknown time Self Yes Yes   Sig: Take 1 tablet (81 mg total) by mouth daily   furosemide (LASIX) 20 mg tablet 9/11/2019 at Unknown time Self No Yes   Sig: Take 1 tablet (20 mg total) by mouth 2 (two) times a day   lisinopril (ZESTRIL) 20 mg tablet 9/11/2019 at Unknown time  No Yes   Sig: Take 1 tablet (20 mg total) by mouth daily   metFORMIN (GLUCOPHAGE-XR) 750 mg 24 hr tablet 9/11/2019 at Unknown time  No Yes   Sig: Take 1 tablet (750 mg total) by mouth daily with dinner for 30 days   Patient taking differently: Take 750 mg by mouth daily with breakfast    metoprolol succinate (TOPROL-XL) 100 mg 24 hr tablet 9/11/2019 at Unknown time Self Yes Yes   Sig: Take 100 mg by mouth daily    nitroglycerin (NITROSTAT) 0 3 mg SL tablet Not Taking at Unknown time Self Yes No   Sig: Nitrostat 0 3 mg sublingual tablet   Place 1 tablet as needed by sublingual route as needed for 90 days     omeprazole (PRILOSEC) 20 mg delayed release capsule 9/11/2019 at Unknown time Self Yes Yes   Sig: Take 1 capsule by mouth Daily   pravastatin (PRAVACHOL) 40 mg tablet 9/11/2019 at Unknown time  No Yes   Sig: TAKE 1 TABLET BY MOUTH AT BEDTIME      Facility-Administered Medications: None       Past Medical History:   Diagnosis Date    Arthritis     Atrial fibrillation (HCC)     Cancer (HCC)     Lymphoma    Cataract     CHF (congestive heart failure) (HCC)     Colitis     [facility]     Coronary artery disease     Diabetes mellitus type II, non insulin dependent (HCC)     Fatty liver     GERD (gastroesophageal reflux disease)     History of chemotherapy     History of radiation therapy     History of shingles 2018    Hypertension     Lymphoma [facility] )     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     Pneumonia     Skin cancer        Past Surgical History:   Procedure Laterality Date    AORTIC VALVE REPLACEMENT      COLECTOMY      COLONOSCOPY      DENTAL SURGERY      KNEE SURGERY      LYMPHADENECTOMY      OTHER SURGICAL HISTORY      MAZE PROCEDURE    [place] TOTAL KNEE ARTHROPLASTY Left 1/28/2019    Procedure: ARTHROPLASTY LEFT KNEE TOTAL;  Surgeon: Jayleen Tinoco MD;  Location:  MAIN OR;  Service: Orthopedics    SKIN BIOPSY         Family History   Problem Relation Age of Onset    Heart block Family     Coronary artery disease Mother     Thrombosis Mother     Hypertension Mother    Mollie Sizer Arthritis Mother     Hyperlipidemia Mother     Diabetes Father     Hypertension Father     Arthritis Father     Sudden death Father         cardiac    Colon cancer Paternal Grandfather      I have reviewed and agree with the history as documented  Social History     Tobacco Use    Smoking status: Former Smoker     Packs/day: 1 00     Years: 40 00     Pack years: 40 00     Types: Cigarettes     Last attempt to quit: 2009     Years since quitting: 10 7    Smokeless tobacco: Never Used   Substance Use Topics    Alcohol use: Yes     Frequency: Monthly or less     Drinks per session: 1 or 2     Binge frequency: Never     Comment: has not drank in over a month    Drug use: No        Review of Systems   Constitutional: Negative for appetite change, diaphoresis, fatigue and fever  HENT: Negative for congestion, rhinorrhea and sore throat  Respiratory: Positive for shortness of breath  Negative for cough and chest tightness  Cardiovascular: Positive for chest pain  Negative for palpitations and leg swelling  Gastrointestinal: Negative for abdominal pain, constipation, diarrhea, nausea and vomiting  Genitourinary: Negative for difficulty urinating, dysuria, frequency and hematuria  Musculoskeletal: Negative for myalgias, neck pain and neck stiffness     Skin: Negative for pallor  Neurological: Negative for syncope, weakness and headaches  All other systems reviewed and are negative  Physical Exam  Physical Exam   Constitutional: He appears well-developed and well-nourished  Non-toxic appearance  No distress  HENT:   Head: Normocephalic and atraumatic  Eyes: Pupils are equal, round, and reactive to light  EOM are normal    Neck: Normal range of motion  No tracheal deviation present  No thyromegaly present  Cardiovascular: Normal rate, regular rhythm and intact distal pulses  Murmur heard  Systolic murmur is present with a grade of 3/6  Pulmonary/Chest: Effort normal and breath sounds normal    Abdominal: Soft  Bowel sounds are normal  He exhibits no distension  There is no tenderness  Musculoskeletal: He exhibits no edema  Lymphadenopathy:     He has no cervical adenopathy  Skin: Skin is warm and dry  He is not diaphoretic  Nursing note and vitals reviewed        Vital Signs  ED Triage Vitals   Temperature Pulse Respirations Blood Pressure SpO2   09/11/19 2016 09/11/19 2016 09/11/19 2030 09/11/19 2016 09/11/19 2016   99 2 °F (37 3 °C) 68 15 (!) 177/74 96 %      Temp Source Heart Rate Source Patient Position - Orthostatic VS BP Location FiO2 (%)   09/11/19 2016 09/11/19 2016 09/11/19 2016 09/11/19 2016 --   Temporal Monitor Lying Right arm       Pain Score       09/11/19 2016       7           Vitals:    09/11/19 2030 09/11/19 2130 09/11/19 2137 09/11/19 2250   BP: 160/75  138/65 148/66   Pulse: 64 62 63 63   Patient Position - Orthostatic VS: Lying  Lying Lying         Visual Acuity  Visual Acuity      Most Recent Value   L Pupil Size (mm)  3   R Pupil Size (mm)  3          ED Medications  Medications   aspirin (ECOTRIN LOW STRENGTH) EC tablet 81 mg (has no administration in time range)   furosemide (LASIX) tablet 20 mg (20 mg Oral Not Given 9/11/19 2251)   lisinopril (ZESTRIL) tablet 20 mg (has no administration in time range)   metoprolol succinate (TOPROL-XL) 24 hr tablet 100 mg (has no administration in time range)   multivitamin-minerals (CENTRUM ADULTS) tablet 1 tablet (has no administration in time range)   pantoprazole (PROTONIX) EC tablet 40 mg (40 mg Oral Given 9/12/19 0548)   pravastatin (PRAVACHOL) tablet 40 mg (40 mg Oral Not Given 9/11/19 2251)   ondansetron (ZOFRAN) injection 4 mg (has no administration in time range)   enoxaparin (LOVENOX) subcutaneous injection 40 mg (has no administration in time range)   metoprolol (LOPRESSOR) injection 5 mg (has no administration in time range)   insulin lispro (HumaLOG) 100 units/mL subcutaneous injection 1-5 Units (1 Units Subcutaneous Not Given 9/12/19 0000)       Diagnostic Studies  Results Reviewed     Procedure Component Value Units Date/Time    B-type natriuretic peptide [[id number]]  (Abnormal) Collected:  09/11/19 2032    Lab Status:  Final result Specimen:  Blood from Arm, Right Updated:  09/11/19 2105     NT-proBNP 1,589 pg/mL     Troponin I [[id number]]  (Normal) Collected:  09/11/19 2026    Lab Status:  Final result Specimen:  Blood from Arm, Right Updated:  09/11/19 2100     Troponin I <0 02 ng/mL     Comprehensive metabolic panel [[id number]]  (Abnormal) Collected:  09/11/19 2032    Lab Status:  Final result Specimen:  Blood from Arm, Right Updated:  09/11/19 2058     Sodium 144 mmol/L      Potassium 3 8 mmol/L      Chloride 103 mmol/L      CO2 34 mmol/L      ANION GAP 7 mmol/L      BUN 18 mg/dL      Creatinine 1 12 mg/dL      Glucose 95 mg/dL      Calcium 9 5 mg/dL      AST 15 U/L      ALT 19 U/L      Alkaline Phosphatase 121 U/L      Total Protein 7 2 g/dL      Albumin 4 3 g/dL      Total Bilirubin 0 50 mg/dL      eGFR 65 ml/min/1 73sq m     Narrative:       [place] guidelines for Chronic Kidney Disease (CKD):     Stage 1 with normal or high GFR (GFR > 90 mL/min/1 73 square meters)    Stage 2 Mild CKD (GFR = 60-89 mL/min/1 73 square meters)    Stage 3A Moderate CKD (GFR = 45-59 mL/min/1 73 square meters)    Stage 3B Moderate CKD (GFR = 30-44 mL/min/1 73 square meters)    Stage 4 Severe CKD (GFR = 15-29 mL/min/1 73 square meters)    Stage 5 End Stage CKD (GFR <15 mL/min/1 73 square meters)  Note: GFR calculation is accurate only with a steady state creatinine    CBC and differential [[id number]]  (Abnormal) Collected:  09/11/19 2026    Lab Status:  Final result Specimen:  Blood from Arm, Right Updated:  09/11/19 2040     WBC 9 44 Thousand/uL      RBC 4 96 Million/uL      Hemoglobin 12 9 g/dL      Hematocrit 41 0 %      MCV 83 fL      MCH 26 0 pg      MCHC 31 5 g/dL      RDW 14 9 %      MPV 9 9 fL      Platelets 985 Thousands/uL      nRBC 0 /100 WBCs      Neutrophils Relative 61 %      Immat GRANS % 1 %      Lymphocytes Relative 24 %      Monocytes Relative 9 %      Eosinophils Relative 4 %      Basophils Relative 1 %      Neutrophils Absolute 5 82 Thousands/µL      Immature Grans Absolute 0 05 Thousand/uL      Lymphocytes Absolute 2 24 Thousands/µL      Monocytes Absolute 0 82 Thousand/µL      Eosinophils Absolute 0 40 Thousand/µL      Basophils Absolute 0 11 Thousands/µL                  XR chest 2 views   ED Interpretation by Alverto Joseph MD (09/11 2102)   No acute pulmonary pathology                 Procedures  ECG 12 Lead Documentation Only  Date/Time: 9/11/2019 8:19 PM  Performed by: Alverto Joseph MD  Authorized by: Alverto Joseph MD     Indications / Diagnosis:  Chest pain  ECG reviewed by me, the ED Provider: yes    Patient location:  ED  Previous ECG:     Previous ECG:  Compared to current    Comparison ECG info:  8/20/2019 normal sinus rhythm with intraventricular conduction block    Similarity:  No change  Interpretation:     Interpretation: abnormal    Rate:     ECG rate:  68    ECG rate assessment: normal    Rhythm:     Rhythm: sinus rhythm    Ectopy:     Ectopy: none    QRS:     QRS axis:  Normal    QRS intervals:   Wide  Conduction: Conduction: abnormal      Abnormal conduction: non-specific intraventricular conduction delay    ST segments:     ST segments:  Non-specific    Elevation:  V2 and V3  T waves:     T waves: inverted      Inverted:  AVL           ED Course  ED Course as of Sep 12 0612   Wed Sep 11, 2019   2110 6069 three weeks ago   NT-proBNP(!): 1,589         HEART Risk Score      Most Recent Value   History  2 Filed at: 09/11/2019 2111   ECG  1 Filed at: 09/11/2019 2111   Age  2 Filed at: 09/11/2019 2111   Risk Factors  2 Filed at: 09/11/2019 2111   Troponin  0 Filed at: 09/11/2019 2111   Heart Score Risk Calculator   History  2 Filed at: 09/11/2019 2111   ECG  1 Filed at: 09/11/2019 2111   Age  2 Filed at: 09/11/2019 2111   Risk Factors  2 Filed at: 09/11/2019 2111   Troponin  0 Filed at: 09/11/2019 2111   HEART Score  7 Filed at: 09/11/2019 2111   HEART Score  7 Filed at: 09/11/2019 2111                            MDM  Number of Diagnoses or Management Options  Chest pain: new and requires workup  CHF exacerbation [facility]): new and requires workup  Diagnosis management comments: 68year old male presents with chest pressure and shortness of breath  Labs significant for elevated BNP; however, no signs of fluid overload on CXR  EKG nonspecific  Troponin negative  HEART score 7  Patient admitted for further evaluation and management          Amount and/or Complexity of Data Reviewed  Clinical lab tests: ordered and reviewed  Tests in the radiology section of CPT®: ordered  Independent visualization of images, tracings, or specimens: yes    Patient Progress  Patient progress: stable      Disposition  Final diagnoses:   CHF exacerbation ([facility] )   Chest pain     Time reflects when diagnosis was documented in both MDM as applicable and the Disposition within this note     Time User Action Codes Description Comment    9/11/2019  9:17 PM Scharlene Failing Add [I50 9] CHF exacerbation ([facility] [address] )     9/11/2019  9:17 PM Scharlene Failing Add [R07 9] Chest pain     9/11/2019  9:17 PM Gayathri Kelvin GIANG Modify [I50 9] CHF exacerbation ([facility] )     9/11/2019  9:17 PM Anup Hennessy Modify [R07 9] Chest pain     9/11/2019  9:56 PM Jenn Mckeon Add [I25 119] Coronary artery disease involving native heart with angina pectoris, unspecified vessel or lesion type ([place] [address] )     9/11/2019  9:56 PM Jenn Mckeon Add [E78 5] Dyslipidemia     9/11/2019  9:56 PM Jenn Mckeon Add [I48 0] Paroxysmal atrial fibrillation ([place] [address] )     9/11/2019  9:56 PM Jenn Mckeon [place] Add [Z95 1] Hx of CABG     9/11/2019  9:56 PM Jenn Mckeon Add [I10] Essential hypertension     9/11/2019  9:57 PM Jenn Mckeon Add [D36 71] Chronic diastolic congestive heart failure ([facility] )     9/11/2019  9:57 PM Jenn Mckeon Add [Z95 3] History of aortic valve replacement with bioprosthetic valve     9/11/2019  9:57 PM Jenn Mckeon Modify [Z95 3] History of aortic valve replacement with bioprosthetic valve       ED Disposition     ED Disposition Condition Date/Time Comment    Admit Stable Wed Sep 11, 2019  9:20 PM Case was discussed with WINTER and the patient's admission status was agreed to be Admission Status: observation status to the service of Dr Dada Crowell   Follow-up Information    None         Current Discharge Medication List      CONTINUE these medications which have NOT CHANGED    Details   aspirin 81 MG tablet Take 1 tablet (81 mg total) by mouth daily    Associated Diagnoses: Primary localized osteoarthritis of left knee      furosemide (LASIX) 20 mg tablet Take 1 tablet (20 mg total) by mouth 2 (two) times a day  Qty: 60 tablet, Refills: 1    Associated Diagnoses: Chest pain      lisinopril (ZESTRIL) 20 mg tablet Take 1 tablet (20 mg total) by mouth daily  Qty: 90 tablet, Refills: 1    Comments: DX Code Needed  PATIENT IS ASKING FOR REFIILS PLEASE  LAST GOT AT HOSPITAL BUT THEY DON'T DO REFILLS    Associated Diagnoses: Essential hypertension      metFORMIN (GLUCOPHAGE-XR) 750 mg 24 hr tablet Take 1 tablet (750 mg total) by mouth daily with dinner for 30 days  Qty: 90 tablet, Refills: 2    Comments: 750 mg to replace 500 mg  Associated Diagnoses: Type 2 diabetes mellitus without complication, without long-term current use of insulin (HCC)      metoprolol succinate (TOPROL-XL) 100 mg 24 hr tablet Take 100 mg by mouth daily       omeprazole (PRILOSEC) 20 mg delayed release capsule Take 1 capsule by mouth Daily      pravastatin (PRAVACHOL) 40 mg tablet TAKE 1 TABLET BY MOUTH AT BEDTIME  Qty: 87 tablet, Refills: 1    Associated Diagnoses: Dyslipidemia      ascorbic acid (VITAMIN C) 500 mg tablet Take 1 tablet (500 mg total) by mouth 2 (two) times a day  Qty: 60 tablet, Refills: 1    Associated Diagnoses: Primary localized osteoarthritis of left knee      Multiple Vitamin (MULTI-VITAMIN) tablet Take 1 tablet by mouth daily  Qty: 30 tablet, Refills: 1    Associated Diagnoses: Primary localized osteoarthritis of left knee      Multiple Vitamins-Minerals (ICAPS AREDS 2) CAPS Take 1 capsule by mouth daily      nitroglycerin (NITROSTAT) 0 3 mg SL tablet Nitrostat 0 3 mg sublingual tablet   Place 1 tablet as needed by sublingual route as needed for 90 days  No discharge procedures on file      ED Provider  Electronically Signed by           Jean Pierre Da Silva MD  09/12/19 8561

## 2019-09-12 NOTE — ASSESSMENT & PLAN NOTE
Wt Readings from Last 3 Encounters:   09/11/19 75 6 kg (166 lb 10 7 oz)   09/06/19 73 5 kg (162 lb)   08/20/19 79 4 kg (175 lb)         Patient with documented history of chronic diastolic CHF  Echo from 02/15/2019 revealed EF of 60% with no regional wall abnormalities  Although BNP slightly elevated at 1589, chest x-ray does not show any pulmonary vascular congestion    · Continue Lasix 20 mg b i d   · Monitor I/O and daily weights

## 2019-09-12 NOTE — ASSESSMENT & PLAN NOTE
Patient has history of CABG in 2014 at [facility]    · Continue aspirin and statin

## 2019-09-12 NOTE — ASSESSMENT & PLAN NOTE
Lab Results   Component Value Date    HGBA1C 5 7 (H) 03/08/2019       Recent Labs     09/11/19  2339 09/12/19  0618 09/12/19  1345   POCGLU 104 102 89       Blood Sugar Average: Last 72 hrs:  (P) 26 [id number]     Blood sugars well controlled, A1c 5 7  · Resume metformin on discharge

## 2019-09-12 NOTE — PROGRESS NOTES
Patient arrived to the [facility] floor  Assisted patient to ambulate to bed without difficulty  Wife at bedside  Patient ambulated to bathroom

## 2019-09-12 NOTE — DISCHARGE INSTR - AVS FIRST PAGE
Dear Magda eFrrer,     It was our pleasure to care for you here at [facility]   It is our hope that we were always able to meet and exceed the expected standards for your care during your stay  You were hospitalized due to chest pressure and shortness of breath on exertion  On presentation you were given nitro and that helped with your Pain  Your EKG showed no acute changes and cardiac troponin enzymes were all within normal limits  By this morning your chest pressure sensation had improved  You had a pharmacological stress test carried out  Please follow up the results with your primary care doctor  You were cared for on the general medical floor under the service of Rolando Sol MD with the [facility] who covers for your primary care physician (PCP), Monie Munoz, DO, while you were hospitalized  If you have any questions or concerns related to this hospitalization, you may contact us at [phone]  For follow up, we recommend that you follow up with your primary care physician  Please review this entire discharge summary as additional general instructions may be provided later as well    However, at this time we provide for you here, the most important instructions / recommendations at discharge:     · Follow-up with Dr Kg Arambula, your cardiologist tomorrow as scheduled  · Follow up with your primary care physician within 1 week of discharge      Sincerely,     Rolando Sol MD

## 2019-09-12 NOTE — PROGRESS NOTES
Progress Note - Laury Drain 1945, 68 y o  male MRN: [id number]    Unit/Bed#: [address] [place] [address] Encounter: [id number]    Primary Care Provider: Estella Arevalo DO   Date and time admitted to hospital: 9/11/2019  8:15 PM        * Chest pressure  Assessment & Plan  Chest pressure on minimal exertion 2/2 unstable angina, relieved by rest and Nitro    · Monitor on telemetry  · Serial troponin negative x3  · EKG with no acute ST segment changes   · For stress test today   · cardiology on board appreciate recommendations  Unstable angina pectoris ([facility] )  Assessment & Plan  · Unstable angina in a patient with History of CAD s/p X CABG x 1, LIMA to LAD, as evidenced by complaint of chest pressure on exertion, improved by rest and nitro, troponins negative x3, EKG no new ST segment changes, now scheduled to undergo stress test  · Cardiology on board, appreciate recommendations      History of aortic valve replacement with bioprosthetic valve  Assessment & Plan  Patient had bioprosthetic aortic valve replacement in 2014 at [facility]  Currently on 81 mg aspirin  Patient should also be on Coumadin given valve replacement with a target INR of 2 5  Would discuss with patient's primary cardiologist Dr Efrem Boykin about anticoagulation    Chronic diastolic congestive heart failure ([facility] )  Assessment & Plan  Wt Readings from Last 3 Encounters:   09/11/19 75 6 kg (166 lb 10 7 oz)   09/06/19 73 5 kg (162 lb)   08/20/19 79 4 kg (175 lb)       History of chronic diastolic heart failure, EF 60%  · Not in acute CHF exacerbation  · Continue with home Lasix 20 mg b i d   · Monitor I/O and daily weights    Essential hypertension  Assessment & Plan  · BP better controlled this morning  · Continue home dose of lisinopril 20 mg p o  Daily and Toprol  mg daily  · Monitor BP per nursing unit  · Appreciate Cardiology recommendations  · Continue with metoprolol 5 mg IV p r n  SBP greater than 170       Paroxysmal atrial fibrillation [facility])  Assessment & Plan  Patient currently sinus rhythm in the 60s  Dyslipidemia  Assessment & Plan  · Continue statin  · Follow asting lipid panel results    Type 2 diabetes mellitus without complication, without long-term current use of insulin [facility])  Assessment & Plan  Lab Results   Component Value Date    HGBA1C 5 7 (H) 2019       Recent Labs     19  2339 19  0618   POCGLU 104 102       Blood Sugar Average: Last 72 hrs:  (P) 103     Blood sugars well controlled, A1c 5 7  · Hold metformin while in hospital  · Monitor blood glucose q 6 hours while NPO  · Initiate SSI    Coronary disease  Assessment & Plan  Patient has history of CABG in  at [facility]  · Continue aspirin, BB and statin    VTE Pharmacologic Prophylaxis:   Pharmacologic: Enoxaparin (Lovenox)  Mechanical VTE Prophylaxis in Place: Yes    Patient Centered Rounds: I have performed bedside rounds with nursing staff today  Discussions with Specialists or Other Care Team Provider:  Discussed with Cardiology    Education and Discussions with Family / Patient:  Patient's  spouse updated at bedside    Time Spent for Care: 20 minutes  More than 50% of total time spent on counseling and coordination of care as described above  Current Length of Stay: 0 day(s)    Current Patient Status: Inpatient   Certification Statement: The patient will continue to require additional inpatient hospital stay due to Unstable angina in the setting of CAD status post CABG    Discharge Plan:  Tomorrow, depending on stress test results    Code Status: Level 1 - Full Code      Subjective:   No overnight events  Patient reports that chest pressure is still present but severity 1/10    He denies palpitations, diaphoresis or dyspnea    Objective:     Vitals:   Temp (24hrs), Av 5 °F (36 9 °C), Min:98 °F (36 7 °C), Max:99 2 °F (37 3 °C)    Temp:  [98 °F (36 7 °C)-99 2 °F (37 3 °C)] 98 °F (36 7 °C)  HR:  [62-68] 64  Resp:  [15-20] 17  BP: (138-177)/(65-75) 146/71  SpO2:  [95 %-98 %] 97 %  Body mass index is 23 25 kg/m²  Input and Output Summary (last 24 hours): Intake/Output Summary (Last 24 hours) at 9/12/2019 1058  Last data filed at 9/12/2019 0701  Gross per 24 hour   Intake    Output 1000 ml   Net -1000 ml       Physical Exam:     Physical Exam   Constitutional: He is oriented to person, place, and time  He appears well-nourished  Cardiovascular: Normal rate and regular rhythm  Exam reveals no gallop and no friction rub  Murmur heard  Pulmonary/Chest: Effort normal and breath sounds normal  No stridor  No respiratory distress  He has no wheezes  He has no rales  He exhibits no tenderness  Abdominal: Soft  Bowel sounds are normal  He exhibits no distension  There is no tenderness  Musculoskeletal: He exhibits no edema  Neurological: He is alert and oriented to person, place, and time  No cranial nerve deficit  Psychiatric: He has a normal mood and affect  Vitals reviewed  Additional Data:     Labs:    Results from last 7 days   Lab Units 09/12/19  0509   WBC Thousand/uL 8 88   HEMOGLOBIN g/dL 11 9*   HEMATOCRIT % 37 2   PLATELETS Thousands/uL 215   NEUTROS PCT % 62   LYMPHS PCT % 24   MONOS PCT % 8   EOS PCT % 4     Results from last 7 days   Lab Units 09/12/19  0509 09/11/19  2032   SODIUM mmol/L 144 144   POTASSIUM mmol/L 3 4* 3 8   CHLORIDE mmol/L 105 103   CO2 mmol/L 33* 34*   BUN mg/dL 17 18   CREATININE mg/dL 1 05 1 12   ANION GAP mmol/L 6 7   CALCIUM mg/dL 9 2 9 5   ALBUMIN g/dL  --  4 3   TOTAL BILIRUBIN mg/dL  --  0 50   ALK PHOS U/L  --  121*   ALT U/L  --  19   AST U/L  --  15   GLUCOSE RANDOM mg/dL 109 95         Results from last 7 days   Lab Units 09/12/19  0618 09/11/19  2339   POC GLUCOSE mg/dl 102 104                   * I Have Reviewed All Lab Data Listed Above  * Additional Pertinent Lab Tests Reviewed:  [address] Admission Reviewed    Imaging:    Imaging Reports Reviewed Today Include:   Imaging Personally Reviewed by Myself Includes:      Recent Cultures (last 7 days):           Last 24 Hours Medication List:     Current Facility-Administered Medications:  aspirin 81 mg Oral Daily DUNG Jessica   enoxaparin 40 mg Subcutaneous Daily DUNG Jessica   furosemide 20 mg Oral BID DUNG Jessica   insulin lispro 1-5 Units Subcutaneous Q6H [address] DUNG Jessica   lisinopril 20 mg Oral Daily DUNG Jessica   metoprolol 5 mg Intravenous Q6H PRN DUNG Damian   metoprolol succinate 100 mg Oral Daily DUNG Guzman   multivitamin-minerals 1 tablet Oral Daily DUNG Jessica   nitroglycerin 0 4 mg Sublingual Q5 Min PRN James Agosto MD   ondansetron 4 mg Intravenous Q6H PRN DUNG Jessica   pantoprazole 40 mg Oral Early Morning DUNG Jessica   pravastatin 40 mg Oral HS DUNG Jessica        Today, Patient Was Seen By: Tali Mejia MD    ** Please Note: Dictation voice to text software may have been used in the creation of this document   **

## 2019-09-12 NOTE — ASSESSMENT & PLAN NOTE
Lab Results   Component Value Date    HGBA1C 5 7 (H) 03/08/2019       Recent Labs     09/11/19  2339   POCGLU 104       Blood Sugar Average: Last 72 hrs:  (P) 104     · Hold metformin while in hospital  · Monitor blood glucose q 6 hours while NPO  · Initiate SSI

## 2019-09-12 NOTE — DISCHARGE SUMMARY
Discharge- Tobias Hooker 1945, 68 y o  male MRN: [id number]    Unit/Bed#: [address] [place] [address] Encounter: [id number]    Primary Care Provider: Wayne Uriostegui DO   Date and time admitted to hospital: 9/11/2019  8:15 PM        * Chest pressure  Assessment & Plan  Chest pressure on minimal exertion 2/2 unstable angina, relieved by rest and Nitro    · S/p NM stress test, results not yet available  · Stable for discharge today per cardiology  · Serial troponin negative x3  · EKG with no acute ST segment changes   · cardiology on board appreciate recommendations  Unstable angina pectoris ([address] )  Assessment & Plan  · Unstable angina in a patient with History of CAD s/p X CABG x 1, LIMA to LAD, as evidenced by complaint of chest pressure on exertion, improved by rest and nitro, troponins negative x3, EKG no new ST segment changes, now scheduled to undergo stress test  · S/p NM stress test, result unavailable currently  · To follow up with his Cardiologist, Dr Sophy Miller as outpatient to discuss the results        History of aortic valve replacement with bioprosthetic valve  Assessment & Plan  Patient had bioprosthetic aortic valve replacement in 2014 at [facility]  Currently on 81 mg aspirin  Patient should also be on Coumadin given valve replacement with a target INR of 2 5, To discuss with his cardiologist on discharge      Chronic diastolic congestive heart failure (HCC)  Assessment & Plan  Wt Readings from Last 3 Encounters:   09/12/19 71 9 kg (158 lb 8 2 oz)   09/06/19 73 5 kg (162 lb)   08/20/19 79 4 kg (175 lb)       History of chronic diastolic heart failure, EF 60%  · Not in acute CHF exacerbation  · Continue with home Lasix 20 mg b i d   · Monitor I/O and daily weights    Essential hypertension  Assessment & Plan  · BP better controlled this morning  · Continue home dose of lisinopril 20 mg p o  Daily and Toprol  mg daily  · Monitor BP per nursing unit    · Appreciate Cardiology recommendations  · Continue with metoprolol 5 mg IV p r n  SBP greater than 170  Paroxysmal atrial fibrillation [facility])  Assessment & Plan  Patient currently sinus rhythm in the 60s  Dyslipidemia  Assessment & Plan  · Continue statin  · Lipid panel results - wnl TC 93, LDL 44, HDL 31    Type 2 diabetes mellitus without complication, without long-term current use of insulin [facility])  Assessment & Plan  Lab Results   Component Value Date    HGBA1C 5 7 (H) 03/08/2019       Recent Labs     09/11/19  2339 09/12/19  0618 09/12/19  1345   POCGLU 104 102 89       Blood Sugar Average: Last 72 hrs:  (P) 60 [id number]     Blood sugars well controlled, A1c 5 7  · Resume metformin on discharge      Coronary disease  Assessment & Plan  Patient has history of CABG in 2014 at   · Continue aspirin, BB and statin      Discharging Physician / Practitioner: Vika Holman MD  PCP: Daniel Carreno DO  Admission Date:   Admission Orders (From admission, onward)     Ordered        09/12/19 0025  Inpatient Admission  Once         09/11/19 2121  Place in Observation (expected length of stay for this patient is less than two midnights)  Once                   Discharge Date: 09/12/19    Resolved Problems  Date Reviewed: 9/12/2019    None          Consultations During Hospital Stay:  · Cardiology    Procedures Performed:   · NM perfusion stress test    Significant Findings / Test Results:     Chest x-ray    FINDINGS: Eward Dubs are median sternotomy wires indicating prior cardiac surgery  Cardiomediastinal silhouette appears unremarkable  The lungs are clear   No pneumothorax or pleural effusion  Osseous structures appear within normal limits for patient age  Impression:       No acute cardiopulmonary disease       ·   Incidental Findings:   · None reported by Radiology     Test Results Pending at Discharge (will require follow up):   · NM myocardial perfusion stress test     Outpatient Tests Requested:  · None    Complications:  None    Reason for Admission:  Chest pressure    Hospital Course:     Laurita Diana is a 68 y o  male patient with past medical history of CAD status post CABG and bioprosthetic AV valve 2014, paroxysymal AFib who originally presented to the hospital on 9/11/2019 due to shortness of breath and chest pressure  Symptoms were relieved at rest and with nitro  He was believed to be having unstable angina and was planned for a stress test   Procedure was uneventful and he is cleared to be discharged and to follow up with Dr Mehdi Juarez, his cardiologist tomorrow as outpatient    Please see above list of diagnoses and related plan for additional information  Condition at Discharge: stable     Discharge Day Visit / Exam:     * Please refer to separate progress note for these details *    Discussion with Family:  Discussed with patient's wife    Discharge instructions/Information to patient and family:   See after visit summary for information provided to patient and family  Provisions for Follow-Up Care:  See after visit summary for information related to follow-up care and any pertinent home health orders  Disposition:     Home    For Discharges to [facility]:   · Not Applicable to this Patient - Not Applicable to this Patient    Planned Readmission:  No     Discharge Statement: This time was spent on the day of discharge  I had direct contact with the patient on the day of discharge  Greater than 50% of the total time was spent examining patient, answering all patient questions, arranging and discussing plan of care with patient as well as directly providing post-discharge instructions  Additional time then spent on discharge activities  Discharge Medications:  See after visit summary for reconciled discharge medications provided to patient and family        ** Please Note: This note has been constructed using a voice recognition system **

## 2019-09-12 NOTE — ASSESSMENT & PLAN NOTE
Patient had bioprosthetic aortic valve replacement in 2014 at [facility]    Currently on 81 mg aspirin  Patient should also be on Coumadin given valve replacement with a target INR of 2 5, To discuss with his cardiologist on discharge

## 2019-09-12 NOTE — ASSESSMENT & PLAN NOTE
Chest pressure on minimal exertion 2/2 unstable angina, relieved by rest and Nitro    · S/p NM stress test, results not yet available  · Stable for discharge today per cardiology  · Serial troponin negative x3  · EKG with no acute ST segment changes   · cardiology on board appreciate recommendations

## 2019-09-12 NOTE — ASSESSMENT & PLAN NOTE
Lab Results   Component Value Date    HGBA1C 5 7 (H) 03/08/2019       Recent Labs     09/11/19  2339 09/12/19  0618   POCGLU 104 102       Blood Sugar Average: Last 72 hrs:  (P) 103     Blood sugars well controlled, A1c 5 7  · Hold metformin while in hospital  · Monitor blood glucose q 6 hours while NPO  · Initiate SSI

## 2019-09-12 NOTE — ASSESSMENT & PLAN NOTE
Chest pressure on minimal exertion 2/2 unstable angina, relieved by rest and Nitro    · Monitor on telemetry  · Serial troponin negative x3  · EKG with no acute ST segment changes   · For stress test today   · cardiology on board appreciate recommendations

## 2019-09-12 NOTE — UTILIZATION REVIEW
Initial Clinical Review    Admission: Date/Time/Statement: Inpatient Admission Orders (From admission, onward)     Ordered        09/12/19 0025  Inpatient Admission  Once                    Inpatient Admission     Standing Status:   Standing     Number of Occurrences:   1     Order Specific Question:   Admitting Physician     Answer:   Ciara Sharp [[id number]]     Order Specific Question:   Level of Care     Answer:   Med Surg [[id number]]     Order Specific Question:   Estimated length of stay     Answer:   More than 2 Midnights     Order Specific Question:   Certification     Answer:   I certify that inpatient services are medically necessary for this patient for a duration of greater than two midnights  See H&P and MD Progress Notes for additional information about the patient's course of treatment  ED Arrival Information     Expected Arrival Acuity Means of Arrival Escorted By Service Admission Type    - 9/11/2019 20:10 Emergent Walk-In Family Member Hospitalist Emergency    Arrival Complaint    CHEST PRESSURE/SOB        Chief Complaint   Patient presents with    Shortness of Breath     Patient presents to the ED stating he has been having chest pressure on the left side of chest and is having SOB  States it has been going on for 1 hour - started when watering the plants  States this is his 4th visit to the ED in a few weeks and has hx of CHF and open heart surgery for aortic valve replacement  Assessment/Plan: 79 yo male to ED from home w/ c/o SOB and chest pressure   Began while watering his [plants  Pain midsternmal 7/10  Since arrival to ED pain has resolved  Admitted IP status w/ chest pressure and CHF , found to have elevated BNP and  ST elevation noted V2 and V3  Will monitor tele , serial trop , ekg , cardiology consult and nuclear stress test   HTN cont lisinopril, toprol and monitor       PE : edema LLE     ED Triage Vitals   Temperature Pulse Respirations Blood Pressure SpO2   09/11/19 2016 09/11/19 2016 09/11/19 2030 09/11/19 2016 09/11/19 2016   99 2 °F (37 3 °C) 68 15 (!) 177/74 96 %      Temp Source Heart Rate Source Patient Position - Orthostatic VS BP Location FiO2 (%)   09/11/19 2016 09/11/19 2016 09/11/19 2016 09/11/19 2016 --   Temporal Monitor Lying Right arm       Pain Score       09/11/19 2016       7        Wt Readings from Last 1 Encounters:   09/11/19 75 6 kg (166 lb 10 7 oz)     Additional Vital Signs:  09/12/19 0748  98 °F (36 7 °C)  64  17  146/71  97 %  None (Room air)  Lying   09/12/19 0130            None (Room air)     09/11/19 2250  98 4 °F (36 9 °C)  63  16  148/66  98 %  None (Room air)  Lying   09/11/19 2137    63    138/65      Lying   09/11/19 2130    62  20    95 %  None (Room air)     09/11/19 2030    64  15  160/75  96 %  None (Room air)  Lying       Pertinent Labs/Diagnostic Test Results:   9/11 CXR - wnl   EKG Sinus rhythm with ST segment elevation in leads V2 and V3 with T-wave inversions in lead aVL  9/12 Stress test - pending   Results from last 7 days   Lab Units 09/12/19  0509 09/11/19 2026   WBC Thousand/uL 8 88 9 44   HEMOGLOBIN g/dL 11 9* 12 9   HEMATOCRIT % 37 2 41 0   PLATELETS Thousands/uL 215 248   NEUTROS ABS Thousands/µL 5 57 5 82     Results from last 7 days   Lab Units 09/12/19  0509 09/11/19 2032   SODIUM mmol/L 144 144   POTASSIUM mmol/L 3 4* 3 8   CHLORIDE mmol/L 105 103   CO2 mmol/L 33* 34*   ANION GAP mmol/L 6 7   BUN mg/dL 17 18   CREATININE mg/dL 1 05 1 12   EGFR ml/min/1 73sq m 71 65   CALCIUM mg/dL 9 2 9 5   MAGNESIUM mg/dL 1 7  --      Results from last 7 days   Lab Units 09/11/19 2032   AST U/L 15   ALT U/L 19   ALK PHOS U/L 121*   TOTAL PROTEIN g/dL 7 2   ALBUMIN g/dL 4 3   TOTAL BILIRUBIN mg/dL 0 50     Results from last 7 days   Lab Units 09/12/19  0618 09/11/19  2339   POC GLUCOSE mg/dl 102 104     Results from last 7 days   Lab Units 09/12/19  0509 09/11/19  2032   GLUCOSE RANDOM mg/dL 109 95     Results from last 7 days Lab Units 09/12/19  0234 09/11/19  2335 09/11/19  2026   TROPONIN I ng/mL <0 02 <0 02 <0 02     Results from last 7 days   Lab Units 09/11/19  2032   NT-PRO BNP pg/mL 1,589*     ED Treatment:   Medication Administration from 09/11/2019 2010 to 09/11/2019 2207     None        Past Medical History:   Diagnosis Date    Arthritis     Atrial fibrillation ([place] [address] )     Cancer ([place] [address] )     Lymphoma    Cataract     CHF (congestive heart failure) ([place] [address] )     Colitis     Colon cancer ([place] [address] )     Coronary artery disease     Diabetes mellitus type II, non insulin dependent (HCC)     Fatty liver     GERD (gastroesophageal reflux disease)     History of chemotherapy     History of radiation therapy     History of shingles 2018    Hypertension     Lymphoma ([place] [address] )     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[address] )     Pneumonia     Skin cancer      Present on Admission:   Chest pressure   Type 2 diabetes mellitus without complication, without long-term current use of insulin (HCC)   Dyslipidemia   Paroxysmal atrial fibrillation (HCC)   Essential hypertension   Chronic diastolic congestive heart failure ([place] [address] )   Coronary disease      Admitting Diagnosis: Paroxysmal atrial fibrillation (HCC) [I48 0]  Chest pain [R07 9]  Essential hypertension [I10]  Chest pressure [R07 89]  Dyslipidemia [E78 5]  Hx of CABG [Z95 1]  CHF exacerbation (HCC) [S22 4]  Chronic diastolic congestive heart failure (HCC) [I50 32]  History of aortic valve replacement with bioprosthetic valve [Z95 3]  Coronary artery disease involving native heart with angina pectoris, unspecified vessel or lesion type ([place] [address] ) [I25 119]  Age/Sex: 68 y o  male  Admission Orders:    Current Facility-Administered Medications:  aspirin 81 mg Oral Daily    enoxaparin 40 mg Subcutaneous Daily    furosemide 20 mg Oral BID    insulin lispro 1-5 Units Subcutaneous Q6H [address]    lisinopril 20 mg Oral Daily    metoprolol 5 mg Intravenous Q6H PRN    metoprolol succinate 100 mg Oral Daily multivitamin-minerals 1 tablet Oral Daily    nitroglycerin 0 4 mg Sublingual Q5 Min PRN    ondansetron 4 mg Intravenous Q6H PRN    pantoprazole 40 mg Oral Early Morning    pravastatin 40 mg Oral HS      Daily weight   Fingerstick ac and hs  And q6hr while NPO   ekg w/ 2nd and 3rd trop  IP CONSULT TO 3 [facility] Utilization Review Department  Phone: [phone]; Fax [phone]  [email or web address]  ATTENTION: Please call with any questions or concerns to [phone]  and carefully listen to the prompts so that you are directed to the right person  Send all requests for admission clinical reviews, approved or denied determinations and any other requests to fax [phone]   All voicemails are confidential

## 2019-09-12 NOTE — ASSESSMENT & PLAN NOTE
Patient with extensive cardiac history presents complaining of midsternal chest pressure which began while he was watering his plants  Pain does not radiate  Nothing worsens pain  Pain improves with rest   Patient did not take anything for the pain  ST segment elevation present in leads V2 and V3    Initial troponin less than 0 02   · Monitor on telemetry  · Serial troponin  · EKG with complaints of chest pain or change in rhythm  · Inpatient consult Cardiology  · Nuclear stress test

## 2019-09-12 NOTE — H&P
H&P- Dung Ortiz 1945, 68 y o  male MRN: [id number]    Unit/Bed#: [address] [place] [address] Encounter: [id number]    Primary Care Provider: Linda Dhaliwal DO   Date and time admitted to hospital: 9/11/2019  8:15 PM        * Chest pressure  Assessment & Plan  Patient with extensive cardiac history presents complaining of midsternal chest pressure which began while he was watering his plants  Pain does not radiate  Nothing worsens pain  Pain improves with rest   Patient did not take anything for the pain  ST segment elevation present in leads V2 and V3  Initial troponin less than 0 02   · Monitor on telemetry  · Serial troponin  · EKG with complaints of chest pain or change in rhythm  · Inpatient consult Cardiology  · Nuclear stress test    Coronary disease  Assessment & Plan  Patient has history of CABG in 2014 at [facility]  · Continue aspirin and statin    Essential hypertension  Assessment & Plan  BP running high with SBP ranging between 160-170  · Continue home dose of lisinopril 20 mg p o  Daily and Toprol  mg daily  · Monitor BP per nursing unit  · Appreciate Cardiology recommendations  · Will add metoprolol 5 mg IV p r n  SBP greater than 170  Dyslipidemia  Assessment & Plan  · Continue statin  · Check fasting lipid panel in a m  Paroxysmal atrial fibrillation [facility])  Assessment & Plan  Patient currently sinus rhythm in the 60s  Had Maze procedure done  Chronic diastolic congestive heart failure (HCC)  Assessment & Plan  Wt Readings from Last 3 Encounters:   09/11/19 75 6 kg (166 lb 10 7 oz)   09/06/19 73 5 kg (162 lb)   08/20/19 79 4 kg (175 lb)         Patient with documented history of chronic diastolic CHF  Echo from 02/15/2019 revealed EF of 60% with no regional wall abnormalities  Although BNP slightly elevated at 1589, chest x-ray does not show any pulmonary vascular congestion    · Continue Lasix 20 mg b i d   · Monitor I/O and daily weights    Type 2 diabetes mellitus without complication, without long-term current use of insulin [facility])  Assessment & Plan  Lab Results   Component Value Date    HGBA1C 5 7 (H) 03/08/2019       Recent Labs     09/11/19  2339   POCGLU 104       Blood Sugar Average: Last 72 hrs:  (P) 104     · Hold metformin while in hospital  · Monitor blood glucose q 6 hours while NPO  · Initiate SSI    History of aortic valve replacement with bioprosthetic valve  Assessment & Plan  Patient had bioprosthetic aortic valve replacement in 2014 at [facility]  VTE Prophylaxis: Enoxaparin (Lovenox)  / sequential compression device   Code Status:  Full code  POLST: There is no POLST form on file for this patient (pre-hospital)  Discussion with family:  Wife present at bedside during admission    Anticipated Length of Stay:  Patient will be admitted on an Inpatient basis with an anticipated length of stay of  > 2 midnights  Justification for Hospital Stay: Close cardiac monitoring  Total Time for Visit, including Counseling / Coordination of Care: 30 minutes  Greater than 50% of this total time spent on direct patient counseling and coordination of care  Chief Complaint:   Chest pressure    History of Present Illness:    Alondra Luis is a 68 y o  male with extensive cardiac history including CABG and bioprosthetic aortic valve replacement in 2014, paroxysmal atrial fibrillation status post Maze procedure, hypertension, hyperlipidemia, anemia, and diabetes mellitus type 2 who presents with complaints of shortness of breath and chest pressure that began this evening  Patient states that symptoms began when he was watering his plants  Patient describes chest pain as midsternal 7/10 non radiating chest pressure  Nothing worsens pain  Chest pressure is relieved with rest   Patient did not take anything for the pain  Since arrival to ED his shortness of breath has resolved  Chest pain is now 4/10    Patient states that his chest pain normally occurs when his blood pressure is elevated  SBP ranging 160-170 in the ED  Normally 130s at home  Patient states this is the 4th visit to the ED for similar symptoms in the last few weeks  Patient follows with Dr Duke Sousa from [place]  Review of Systems:    Review of Systems   Constitutional: Positive for activity change  Negative for appetite change, chills and fever  Respiratory: Negative for apnea, cough and shortness of breath (resolved)  Cardiovascular: Positive for chest pain (4/10 midsternal)  Negative for palpitations and leg swelling  Gastrointestinal: Negative for abdominal distention, abdominal pain, constipation, diarrhea, nausea and vomiting  Genitourinary: Negative for dysuria  Neurological: Negative for dizziness, seizures, syncope, facial asymmetry, weakness, light-headedness, numbness and headaches  Psychiatric/Behavioral: Negative for agitation and confusion  The patient is not nervous/anxious  All other systems reviewed and are negative        Past Medical and Surgical History:     Past Medical History:   Diagnosis Date    Arthritis     Atrial fibrillation (HCC)     Cancer (HCC)     Lymphoma    Cataract     CHF (congestive heart failure) (HCC)     Colitis     [facility] [address] [facility]     Coronary artery disease     Diabetes mellitus type II, non insulin dependent (HCC)     Fatty liver     GERD (gastroesophageal reflux disease)     History of chemotherapy     History of radiation therapy     History of shingles 2018    Hypertension     Lymphoma ([facility] [address] [facility]     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 Authorizing Provider   aspirin 81 MG tablet Take 1 tablet (81 mg total) by mouth daily 3/29/19  Yes Thompson Alexander MD   furosemide (LASIX) 20 mg tablet Take 1 tablet (20 mg total) by mouth 2 (two) times a day 9/6/19  Yes Ean Marrero DO   lisinopril (ZESTRIL) 20 mg tablet Take 1 tablet (20 mg total) by mouth daily 9/6/19  Yes Ean Marrero DO   metFORMIN (GLUCOPHAGE-XR) 750 mg 24 hr tablet Take 1 tablet (750 mg total) by mouth daily with dinner for 30 days  Patient taking differently: Take 750 mg by mouth daily with breakfast  1/9/19 9/11/19 Yes Ean Marrero DO   metoprolol succinate (TOPROL-XL) 100 mg 24 hr tablet Take 100 mg by mouth daily  10/18/18  Yes Historical Provider, MD   omeprazole (PRILOSEC) 20 mg delayed release capsule Take 1 capsule by mouth Daily 9/14/12  Yes Historical Provider, MD   pravastatin (PRAVACHOL) 40 mg tablet TAKE 1 TABLET BY MOUTH AT BEDTIME 7/14/19  Yes Radha Pettit DO   ascorbic acid (VITAMIN C) 500 mg tablet Take 1 tablet (500 mg total) by mouth 2 (two) times a day  Patient not taking: Reported on 8/20/2019 1/4/19   Jean Pierre Donato PA-C   Multiple Vitamin (MULTI-VITAMIN) tablet Take 1 tablet by mouth daily 1/4/19   Jean Pierre Donato PA-C   Multiple Vitamins-Minerals (ICAPS AREDS 2) CAPS Take 1 capsule by mouth daily    Historical Provider, MD   nitroglycerin (NITROSTAT) 0 3 mg SL tablet Nitrostat 0 3 mg sublingual tablet   Place 1 tablet as needed by sublingual route as needed for 90 days  Historical Provider, MD     I have reviewed home medications with patient personally  Allergies: Allergies   Allergen Reactions    Ceftin [Cefuroxime] Itching     However, has tolerated Cefazolin and Pip-Tazo since, which have different side chains  Be cautious with / avoid 2nd, 3rd, or 4th Gen Cephs that have similar side chains to Cefuroxime         Social History:     Marital Status: /Civil Union   Occupation: Works in office 3 times per week  Patient Pre-hospital Living Situation: Lives with wife  Patient Pre-hospital Level of Mobility: Independent  Patient Pre-hospital Diet Restrictions: Low salt, Low sugar  Substance Use History:   Social History     Substance and Sexual Activity   Alcohol Use Yes    Frequency: Monthly or less    Drinks per session: 1 or 2    Binge frequency: Never    Comment: has not drank in over a month     Social History     Tobacco Use   Smoking Status Former Smoker    Packs/day: 1 00    Years: 40 00    Pack years: 40 00    Types: Cigarettes    Last attempt to quit: 2009    Years since quitting: 10 7   Smokeless Tobacco Never Used     Social History     Substance and Sexual Activity   Drug Use No       Family History:    non-contributory    Physical Exam:     Vitals:   Blood Pressure: 148/66 (09/11/19 2250)  Pulse: 63 (09/11/19 2250)  Temperature: 98 4 °F (36 9 °C) (09/11/19 2250)  Temp Source: Oral (09/11/19 2250)  Respirations: 16 (09/11/19 2250)  Height: 5' 11" (180 3 cm) (09/11/19 2250)  Weight - Scale: 75 6 kg (166 lb 10 7 oz) (09/11/19 2250)  SpO2: 98 % (09/11/19 2250)    Physical Exam   Constitutional: He is oriented to person, place, and time  He appears well-developed and well-nourished  He is cooperative  No distress  HENT:   Head: Normocephalic and atraumatic  Eyes: Pupils are equal, round, and reactive to light  EOM are normal    Neck: Normal range of motion  Neck supple  Cardiovascular: Normal rate, regular rhythm and intact distal pulses  Exam reveals no gallop and no friction rub  Murmur heard  Pulmonary/Chest: Effort normal and breath sounds normal  No respiratory distress  He has no wheezes  He has no rales  Abdominal: Soft  Bowel sounds are normal  He exhibits no distension  There is no tenderness  Musculoskeletal: Normal range of motion  He exhibits edema (trace edema to LLE)     Neurological: He is alert and oriented to person, place, and time  Skin: Skin is warm and dry  Psychiatric: He has a normal mood and affect  Judgment normal    Nursing note and vitals reviewed  Additional Data:     Lab Results: I have personally reviewed pertinent reports  Results from last 7 days   Lab Units 09/11/19 2026   WBC Thousand/uL 9 44   HEMOGLOBIN g/dL 12 9   HEMATOCRIT % 41 0   PLATELETS Thousands/uL 248   NEUTROS PCT % 61   LYMPHS PCT % 24   MONOS PCT % 9   EOS PCT % 4     Results from last 7 days   Lab Units 09/11/19 2032   SODIUM mmol/L 144   POTASSIUM mmol/L 3 8   CHLORIDE mmol/L 103   CO2 mmol/L 34*   BUN mg/dL 18   CREATININE mg/dL 1 12   ANION GAP mmol/L 7   CALCIUM mg/dL 9 5   ALBUMIN g/dL 4 3   TOTAL BILIRUBIN mg/dL 0 50   ALK PHOS U/L 121*   ALT U/L 19   AST U/L 15   GLUCOSE RANDOM mg/dL 95         Results from last 7 days   Lab Units 09/11/19  2339   POC GLUCOSE mg/dl 104               Imaging: I have personally reviewed pertinent films in PACS    XR chest 2 views   ED Interpretation by Arlet Bruno MD (09/11 2102)   No acute pulmonary pathology          EKG, Pathology, and Other Studies Reviewed on Admission:   · EKG:  Sinus rhythm with ST segment elevation in leads V2 and V3 with T-wave inversions in lead aVL    Allscripts / Epic Records Reviewed: Yes     ** Please Note: This note has been constructed using a voice recognition system   **

## 2019-09-12 NOTE — ASSESSMENT & PLAN NOTE
Patient has history of CABG in 2014 at [facility]    · Continue aspirin, BB and statin

## 2019-09-12 NOTE — ASSESSMENT & PLAN NOTE
· BP better controlled this morning  · Continue home dose of lisinopril 20 mg p o  Daily and Toprol  mg daily  · Monitor BP per nursing unit  · Appreciate Cardiology recommendations  · Continue with metoprolol 5 mg IV p r n  SBP greater than 170

## 2019-09-12 NOTE — SOCIAL WORK
LOS: 0  Pt is not a documented bundle  Pt is not a 30 day readmission  Met with Pt  Pt's wife at bedside  Pt presents AA&Ox3  Discussed role of case management and discharge planning  Pt reports he lives with wife in [address]  Pt reports he is independent with adls and ambulation  Pt reports no DME  Pt reports he and his wife drive, grocery shopping and meal prep  Pt reports he has had [facility] in past  Pt denies SNF in past  Pt denies mental health and drug and alcohol treatment  Pt reports he has living will but does not have POA  Pt declines information for POA  Pt uses [facility] on [address]  Pt reports he has prescription plan and able to afford to medications  Pt reports his wife would help him at home if needed  Pt reports his preference for discharge is to return home and does not anticipate discharge needs  Will follow

## 2019-09-12 NOTE — ASSESSMENT & PLAN NOTE
· Unstable angina in a patient with History of CAD s/p X CABG x 1, LIMA to LAD, as evidenced by complaint of chest pressure on exertion, improved by rest and nitro, troponins negative x3, EKG no new ST segment changes, now scheduled to undergo stress test  · S/p NM stress test, result unavailable currently  · To follow up with his Cardiologist, Dr Jean-Pierre Mckinney as outpatient to discuss the results

## 2019-09-13 ENCOUNTER — OFFICE VISIT (OUTPATIENT)
Dept: CARDIOLOGY CLINIC | Facility: CLINIC | Age: 74
End: 2019-09-13
Payer: COMMERCIAL

## 2019-09-13 ENCOUNTER — TRANSITIONAL CARE MANAGEMENT (OUTPATIENT)
Dept: FAMILY MEDICINE CLINIC | Facility: HOSPITAL | Age: 74
End: 2019-09-13

## 2019-09-13 ENCOUNTER — TELEPHONE (OUTPATIENT)
Dept: CARDIOLOGY CLINIC | Facility: CLINIC | Age: 74
End: 2019-09-13

## 2019-09-13 VITALS
WEIGHT: 163 LBS | DIASTOLIC BLOOD PRESSURE: 62 MMHG | HEART RATE: 68 BPM | HEIGHT: 71 IN | SYSTOLIC BLOOD PRESSURE: 110 MMHG | BODY MASS INDEX: 22.82 KG/M2

## 2019-09-13 DIAGNOSIS — I25.10 CORONARY ARTERY DISEASE INVOLVING NATIVE CORONARY ARTERY OF NATIVE HEART WITHOUT ANGINA PECTORIS: Primary | ICD-10-CM

## 2019-09-13 PROCEDURE — 99214 OFFICE O/P EST MOD 30 MIN: CPT | Performed by: INTERNAL MEDICINE

## 2019-09-13 RX ORDER — ATORVASTATIN CALCIUM 40 MG/1
40 TABLET, FILM COATED ORAL DAILY
Qty: 90 TABLET | Refills: 3 | Status: SHIPPED | OUTPATIENT
Start: 2019-09-13 | End: 2020-09-02 | Stop reason: SDUPTHER

## 2019-09-13 NOTE — PROGRESS NOTES
Cardiology Follow Up    Zachary Ramirez  1945  [id number]  [address] [facility] [place]  [address] [id number] [phone]    No diagnosis found  Interval History: Followup for CAD    He was hospitalized this week for atypical chest pain  He had an apical perfusion defect that is reported on prior stress testing at [facility]  Otherwise he is doing well       Problem List     Basal cell carcinoma of skin    Coronary disease    Type 2 diabetes mellitus without complication, without long-term current use of insulin ([address] )    Lab Results   Component Value Date    HGBA1C 5 7 (H) 03/08/2019       Recent Labs     09/11/19  2339 09/12/19  0618 09/12/19  1345   POCGLU 104 102 89       Blood Sugar Average: Last 72 hrs:          Dyslipidemia    Elevation of level of transaminase or lactic acid dehydrogenase (LDH)    Erectile dysfunction    Esophageal reflux    Fatty liver    Malignant lymphoma ([facility] )    Overview Signed 2/8/2018 11:17 AM by Lashawn Devlin DO     Description: small cell lymphocytic lymphoma         Malignant tumor of cecum ([facility] )    Multiple myeloma not having achieved remission ([facility] )    Primary localized osteoarthritis of right knee    Paroxysmal atrial fibrillation (HCC)    Hx of CABG    Colitis    Essential hypertension    S/P total knee arthroplasty, left    Overview Signed 1/28/2019  4:19 PM by Drea Fernandez     -  S/P L TKA today  -  Orthopedic surgery attending           Aftercare following left knee joint replacement surgery    Chronic diastolic congestive heart failure ([facility] )    Wt Readings from Last 3 Encounters:   09/13/19 73 9 kg (163 lb)   09/12/19 71 9 kg (158 lb 8 2 oz)   09/06/19 73 5 kg (162 lb)                 History of aortic valve replacement with bioprosthetic valve (Chronic)    Leukocytosis    Dyspnea    Hyponatremia    Lactic acidosis    Atrial fibrillation (HCC)    Chest pressure Unstable angina pectoris [facility])        Past Medical History:   Diagnosis Date    Arthritis     Atrial fibrillation (HCC)     Cancer (HCC)     Lymphoma    Cataract     CHF (congestive heart failure) (HCC)     Colitis     Colon cancer ([place] [address] )     Coronary artery disease     Diabetes mellitus type II, non insulin dependent (HCC)     Fatty liver     GERD (gastroesophageal reflux disease)     History of chemotherapy     History of radiation therapy     History of shingles 2018    Hypertension     Lymphoma ([facility] )     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[place] [address] )     Pneumonia     Skin cancer      Social History     Socioeconomic History    Marital status: /Civil Union     Spouse name: Not on file    Number of children: Not on file    Years of education: Not on file    Highest education level: Not on file   Occupational History    Occupation: WORKING FULLTIME    Social Needs    Financial resource strain: Not on file    Food insecurity:     Worry: Not on file     Inability: Not on file    Transportation needs:     Medical: Not on file     Non-medical: Not on file   Tobacco Use    Smoking status: Former Smoker     Packs/day: 1 00     Years: 40 00     Pack years: 40 00     Types: Cigarettes     Last attempt to quit: 2009     Years since quitting: 10 7    Smokeless tobacco: Never Used   Substance and Sexual Activity    Alcohol use: Yes     Frequency: Monthly or less     Drinks per session: 1 or 2     Binge frequency: Never     Comment: has not drank in over a month    Drug use: No    Sexual activity: Not on file   Lifestyle    Physical activity:     Days per week: Not on file     Minutes per session: Not on file    Stress: Not on file   Relationships    Social connections:     Talks on phone: Not on file     Gets together: Not on file     Attends Yarsanism service: Not on file     Active member of club or organization: Not on file     Attends meetings of clubs or organizations: Not on file     Relationship status: Not on file    Intimate partner violence:     Fear of current or ex partner: Not on file     Emotionally abused: Not on file     Physically abused: Not on file     Forced sexual activity: Not on file   Other Topics Concern    Not on file   Social History Narrative    Not on file      Family History   Problem Relation Age of Onset    Heart block Family     Coronary artery disease Mother     Thrombosis Mother     Hypertension Mother    Marilyn Ravel Arthritis Mother     Hyperlipidemia Mother     Diabetes Father     Hypertension Father     Arthritis Father     Sudden death Father         cardiac    Colon cancer Paternal Grandfather      Past Surgical History:   Procedure Laterality Date    AORTIC VALVE REPLACEMENT      COLECTOMY      COLONOSCOPY      DENTAL SURGERY      KNEE SURGERY      LYMPHADENECTOMY      OTHER SURGICAL HISTORY      MAZE PROCEDURE    WA TOTAL KNEE ARTHROPLASTY Left 1/28/2019    Procedure: ARTHROPLASTY LEFT KNEE TOTAL;  Surgeon: Rich Goldsmith MD;  Location:  MAIN OR;  Service: Orthopedics    SKIN BIOPSY         Current Outpatient Medications:     aspirin 81 MG tablet, Take 1 tablet (81 mg total) by mouth daily, Disp: , Rfl:     furosemide (LASIX) 20 mg tablet, Take 1 tablet (20 mg total) by mouth 2 (two) times a day, Disp: 60 tablet, Rfl: 1    lisinopril (ZESTRIL) 20 mg tablet, Take 1 tablet (20 mg total) by mouth daily, Disp: 90 tablet, Rfl: 1    metFORMIN (GLUCOPHAGE-XR) 750 mg 24 hr tablet, Take 1 tablet (750 mg total) by mouth daily with dinner for 30 days (Patient taking differently: Take 750 mg by mouth daily with breakfast ), Disp: 90 tablet, Rfl: 2    metoprolol succinate (TOPROL-XL) 100 mg 24 hr tablet, Take 100 mg by mouth daily , Disp: , Rfl:     Multiple Vitamin (MULTI-VITAMIN) tablet, Take 1 tablet by mouth daily, Disp: 30 tablet, Rfl: 1    Multiple Vitamins-Minerals (ICAPS AREDS 2) CAPS, Take 1 capsule by mouth daily, Disp: , Rfl:     omeprazole (PRILOSEC) 20 mg delayed release capsule, Take 1 capsule by mouth Daily, Disp: , Rfl:     pravastatin (PRAVACHOL) 40 mg tablet, TAKE 1 TABLET BY MOUTH AT BEDTIME, Disp: 87 tablet, Rfl: 1    ascorbic acid (VITAMIN C) 500 mg tablet, Take 1 tablet (500 mg total) by mouth 2 (two) times a day (Patient not taking: Reported on 8/20/2019), Disp: 60 tablet, Rfl: 1    nitroglycerin (NITROSTAT) 0 3 mg SL tablet, Nitrostat 0 3 mg sublingual tablet  Place 1 tablet as needed by sublingual route as needed for 90 days  , Disp: , Rfl:   No current facility-administered medications for this visit  Allergies   Allergen Reactions    Ceftin [Cefuroxime] Itching     However, has tolerated Cefazolin and Pip-Tazo since, which have different side chains  Be cautious with / avoid 2nd, 3rd, or 4th Gen Cephs that have similar side chains to Cefuroxime         Labs:     Chemistry        Component Value Date/Time     05/19/2017 0720    K 3 4 (L) 09/12/2019 0509    K 4 4 01/08/2019 0854     09/12/2019 0509    CL 99 01/08/2019 0854    CO2 33 (H) 09/12/2019 0509    CO2 22 01/08/2019 0854    BUN 17 09/12/2019 0509    BUN 33 (H) 01/08/2019 0854    CREATININE 1 05 09/12/2019 0509    CREATININE 0 93 05/19/2017 0720        Component Value Date/Time    CALCIUM 9 2 09/12/2019 0509    CALCIUM 9 3 05/19/2017 0720    ALKPHOS 121 (H) 09/11/2019 2032    ALKPHOS 97 05/19/2017 0720    AST 15 09/11/2019 2032    AST 21 01/08/2019 0854    ALT 19 09/11/2019 2032    ALT 20 01/08/2019 0854    BILITOT 0 9 05/19/2017 0720            Lab Results   Component Value Date    CHOL 131 05/19/2017    CHOL 111 02/05/2016     Lab Results   Component Value Date    HDL 31 (L) 09/12/2019    HDL 22 (L) 02/15/2019    HDL 31 (L) 05/18/2018     Lab Results   Component Value Date    LDLCALC 44 09/12/2019    LDLCALC 44 02/15/2019    LDLCALC 72 05/19/2017     Lab Results   Component Value Date    TRIG 88 09/12/2019    TRIG 108 02/15/2019    TRIG 171 (H) 05/18/2018     Lab Results Component Value Date    CHOLHDL 4 0 05/18/2018       Imaging: Xr Chest 2 Views    Result Date: 9/12/2019  Narrative: CHEST INDICATION:   Chest Pain  COMPARISON:  8/20/2019  EXAM PERFORMED/VIEWS:  XR CHEST PA & LATERAL  The frontal view was performed utilizing dual energy radiographic technique  Images: 4 FINDINGS:  There are median sternotomy wires indicating prior cardiac surgery  Cardiomediastinal silhouette appears unremarkable  The lungs are clear  No pneumothorax or pleural effusion  Osseous structures appear within normal limits for patient age  Impression: No acute cardiopulmonary disease  Workstation performed: [id number]     Xr Chest 2 Views    Result Date: 8/21/2019  Narrative: CHEST INDICATION:   Chest Pain  COMPARISON:  7/25/2019 at 6:24 PM  EXAM PERFORMED/VIEWS:  XR CHEST PA & LATERAL  The frontal view was performed utilizing dual energy radiographic technique  FINDINGS:  Median sternotomy wires are intact  Prosthetic valve and epicardial pacer leads are again noted  Heart shadow appears unremarkable  Atherosclerotic vascular calcifications are noted  Trace bilateral pleural effusions are present  There are no focal consolidations or pneumothorax  Osseous structures appear within normal limits for patient age  Impression: Trace pleural effusions  Workstation performed: [id number]         Review of Systems   Constitution: Negative  HENT: Negative  Eyes: Negative  Cardiovascular: Negative  Respiratory: Negative  Endocrine: Negative  Hematologic/Lymphatic: Negative  Skin: Negative  Musculoskeletal: Negative  Gastrointestinal: Negative  Genitourinary: Negative  Neurological: Negative  Psychiatric/Behavioral: Negative  Allergic/Immunologic: Negative  Vitals:    09/13/19 1429   BP: 110/62   Pulse: 68           Physical Exam   Constitutional: He is oriented to person, place, and time  HENT:   Mouth/Throat: No oropharyngeal exudate     Eyes: No scleral icterus  Neck: No JVD present  Cardiovascular: Normal rate and regular rhythm  Exam reveals no gallop and no friction rub  No murmur heard  Pulmonary/Chest: Effort normal and breath sounds normal  No stridor  No respiratory distress  He has no wheezes  Abdominal: Soft  Bowel sounds are normal  He exhibits no distension  There is no tenderness  There is no guarding  Musculoskeletal: He exhibits no edema  Neurological: He is alert and oriented to person, place, and time  Skin: Skin is warm and dry  He is not diaphoretic  Psychiatric: He has a normal mood and affect  His behavior is normal        Discussion/Summary:    Coronary Artery Disease s/p CARLSON To LAD: We reviewed his stress testing as above       HTN: BP stable on lisinopril       S/p BIO AVR: normal function on his echocardiogram       Hx of PAF in the past and s/p MAZE and TALIA ligation           The patient was counseled regarding diagnostic results, instructions for management, risk factor reductions, impressions  total time of encounter was 25 minutes and 15 minutes was spent counseling

## 2019-09-13 NOTE — TELEPHONE ENCOUNTER
Pt was called to reschedule appt due to internet problems (not working at all for 24 hours including phones and computers )   Pt requested results of stress test       Please advise

## 2019-09-16 NOTE — UTILIZATION REVIEW
Notification of Discharge  This is a Notification of Discharge from our facility [address]  Please be advised that this patient has been discharge from our facility  Below you will find the admission and discharge date and time including the patients disposition  PRESENTATION DATE: 9/11/2019  8:15 PM  OBS ADMISSION DATE:   IP ADMISSION DATE: 9/12/19 0025   DISCHARGE DATE: 9/12/2019  3:13 PM  DISPOSITION: Home/Self Care Home/Self Care   Admission Orders listed below:  Admission Orders (From admission, onward)     Ordered        09/12/19 0025  Inpatient Admission  Once         09/11/19 2121  Place in Observation (expected length of stay for this patient is less than two midnights)  Once                   Please contact the UR Department if additional information is required to close this patient's authorization/case  [address]  Utilization Review Department  Phone: [phone]; Fax [phone]  [email or web address]  ATTENTION: Please call with any questions or concerns to [phone]  and carefully listen to the prompts so that you are directed to the right person  Send all requests for admission clinical reviews, approved or denied determinations and any other requests to fax [phone]   All voicemails are confidential

## 2019-09-21 LAB
ALBUMIN SERPL-MCNC: 4.8 G/DL (ref 3.5–4.8)
ALBUMIN/CREAT UR: 4.9 MG/G CREAT (ref 0–30)
ALBUMIN/GLOB SERPL: 2.5 {RATIO} (ref 1.2–2.2)
ALP SERPL-CCNC: 91 IU/L (ref 39–117)
ALT SERPL-CCNC: 9 IU/L (ref 0–44)
AST SERPL-CCNC: 14 IU/L (ref 0–40)
BASOPHILS # BLD AUTO: 0.1 X10E3/UL (ref 0–0.2)
BASOPHILS NFR BLD AUTO: 1 %
BILIRUB SERPL-MCNC: 0.6 MG/DL (ref 0–1.2)
BUN SERPL-MCNC: 17 MG/DL (ref 8–27)
BUN/CREAT SERPL: 17 (ref 10–24)
CALCIUM SERPL-MCNC: 10 MG/DL (ref 8.6–10.2)
CHLORIDE SERPL-SCNC: 97 MMOL/L (ref 96–106)
CO2 SERPL-SCNC: 31 MMOL/L (ref 20–29)
CREAT SERPL-MCNC: 1 MG/DL (ref 0.76–1.27)
CREAT UR-MCNC: 82.9 MG/DL
EOSINOPHIL # BLD AUTO: 0.3 X10E3/UL (ref 0–0.4)
EOSINOPHIL NFR BLD AUTO: 3 %
ERYTHROCYTE [DISTWIDTH] IN BLOOD BY AUTOMATED COUNT: 15.7 % (ref 12.3–15.4)
EST. AVERAGE GLUCOSE BLD GHB EST-MCNC: 120 MG/DL
GLOBULIN SER-MCNC: 1.9 G/DL (ref 1.5–4.5)
GLUCOSE SERPL-MCNC: 102 MG/DL (ref 65–99)
HBA1C MFR BLD: 5.8 % (ref 4.8–5.6)
HCT VFR BLD AUTO: 39.9 % (ref 37.5–51)
HGB BLD-MCNC: 12.9 G/DL (ref 13–17.7)
IMM GRANULOCYTES # BLD: 0 X10E3/UL (ref 0–0.1)
IMM GRANULOCYTES NFR BLD: 0 %
LYMPHOCYTES # BLD AUTO: 1.8 X10E3/UL (ref 0.7–3.1)
LYMPHOCYTES NFR BLD AUTO: 22 %
MCH RBC QN AUTO: 26.3 PG (ref 26.6–33)
MCHC RBC AUTO-ENTMCNC: 32.3 G/DL (ref 31.5–35.7)
MCV RBC AUTO: 81 FL (ref 79–97)
MICROALBUMIN UR-MCNC: 4.1 UG/ML
MONOCYTES # BLD AUTO: 0.6 X10E3/UL (ref 0.1–0.9)
MONOCYTES NFR BLD AUTO: 8 %
NEUTROPHILS # BLD AUTO: 5.5 X10E3/UL (ref 1.4–7)
NEUTROPHILS NFR BLD AUTO: 66 %
PLATELET # BLD AUTO: 247 X10E3/UL (ref 150–450)
POTASSIUM SERPL-SCNC: 3.8 MMOL/L (ref 3.5–5.2)
PROT SERPL-MCNC: 6.7 G/DL (ref 6–8.5)
RBC # BLD AUTO: 4.91 X10E6/UL (ref 4.14–5.8)
SL AMB EGFR AFRICAN AMERICAN: 87 ML/MIN/1.73
SL AMB EGFR NON AFRICAN AMERICAN: 75 ML/MIN/1.73
SODIUM SERPL-SCNC: 144 MMOL/L (ref 134–144)
TSH SERPL DL<=0.005 MIU/L-ACNC: 3.93 UIU/ML (ref 0.45–4.5)
WBC # BLD AUTO: 8.3 X10E3/UL (ref 3.4–10.8)

## 2019-09-23 ENCOUNTER — OFFICE VISIT (OUTPATIENT)
Dept: FAMILY MEDICINE CLINIC | Facility: HOSPITAL | Age: 74
End: 2019-09-23
Payer: COMMERCIAL

## 2019-09-23 VITALS
TEMPERATURE: 97.1 F | SYSTOLIC BLOOD PRESSURE: 106 MMHG | WEIGHT: 162 LBS | DIASTOLIC BLOOD PRESSURE: 68 MMHG | BODY MASS INDEX: 22.68 KG/M2 | HEART RATE: 69 BPM | HEIGHT: 71 IN

## 2019-09-23 DIAGNOSIS — Z09 HOSPITAL DISCHARGE FOLLOW-UP: Primary | ICD-10-CM

## 2019-09-23 DIAGNOSIS — I50.32 CHRONIC DIASTOLIC CONGESTIVE HEART FAILURE (HCC): ICD-10-CM

## 2019-09-23 DIAGNOSIS — E11.9 TYPE 2 DIABETES MELLITUS WITHOUT COMPLICATION, WITHOUT LONG-TERM CURRENT USE OF INSULIN (HCC): ICD-10-CM

## 2019-09-23 DIAGNOSIS — R07.89 CHEST PRESSURE: ICD-10-CM

## 2019-09-23 DIAGNOSIS — Z23 NEED FOR INFLUENZA VACCINATION: ICD-10-CM

## 2019-09-23 DIAGNOSIS — I10 ESSENTIAL HYPERTENSION: ICD-10-CM

## 2019-09-23 PROBLEM — Z96.652 AFTERCARE FOLLOWING LEFT KNEE JOINT REPLACEMENT SURGERY: Status: RESOLVED | Noted: 2019-02-11 | Resolved: 2019-09-23

## 2019-09-23 PROBLEM — Z47.1 AFTERCARE FOLLOWING LEFT KNEE JOINT REPLACEMENT SURGERY: Status: RESOLVED | Noted: 2019-02-11 | Resolved: 2019-09-23

## 2019-09-23 PROCEDURE — 99495 TRANSJ CARE MGMT MOD F2F 14D: CPT | Performed by: INTERNAL MEDICINE

## 2019-09-23 PROCEDURE — 90662 IIV NO PRSV INCREASED AG IM: CPT | Performed by: INTERNAL MEDICINE

## 2019-09-23 PROCEDURE — G0008 ADMIN INFLUENZA VIRUS VAC: HCPCS | Performed by: INTERNAL MEDICINE

## 2019-09-23 RX ORDER — METFORMIN HYDROCHLORIDE 500 MG/1
500 TABLET, EXTENDED RELEASE ORAL
Qty: 90 TABLET | Refills: 2 | Status: SHIPPED | OUTPATIENT
Start: 2019-09-23 | End: 2019-10-01 | Stop reason: SDUPTHER

## 2019-09-23 NOTE — ASSESSMENT & PLAN NOTE
Resolved, Stress while IP reviewed - EF 40% and fixed apical/inferolateral defect was noted but c/w previous stress, pt had f/u with Cardio and no additional studies were ordered and no meds were changed, pt denies any repeat CP since discharge, he is on Lasix/Lipitor/ASA/Toprol, call with recurrent CP

## 2019-09-23 NOTE — ASSESSMENT & PLAN NOTE
Lab Results   Component Value Date    HGBA1C 5 8 (H) 09/20/2019       No results for input(s): POCGLU in the last 72 hours      Blood Sugar Average: Last 72 hrs: 120's fasting, 150's after meals, DM type 2 w/o complications - controlled -A1C now low at 5 8, con't watching sugars/carbs and keep active, decrease Metformin XR to 500 mg 1 tab PO q day - may alternate qod with the 750 mg until he runs out, recheck BW in 6 mo - will order at next appt, on statin/ACE, foot exam done 12/18 and eye exam done 4/18 - had eye appt 7/19 but optho called and they DID NOT DO A DILATED DIABETIC EXAM BUT STATES THEY WILL AT PTS NEXT APPT IN [facility] [address]

## 2019-09-23 NOTE — ASSESSMENT & PLAN NOTE
Wt Readings from Last 3 Encounters:   09/23/19 73 5 kg (162 lb)   09/13/19 73 9 kg (163 lb)   09/12/19 71 9 kg (158 lb 8 2 oz)       No current s/sx of exacerbation, on ACE/beta blocker/statin/ASA/Lasix, just saw Cardio and no studies ordered and no meds changed, con't watching weights and call with any s/sx of exacerbation

## 2019-09-23 NOTE — PROGRESS NOTES
Assessment/Plan:    Chest pressure  Resolved, Stress while IP reviewed - EF 40% and fixed apical/inferolateral defect was noted but c/w previous stress, pt had f/u with Cardio and no additional studies were ordered and no meds were changed, pt denies any repeat CP since discharge, he is on Lasix/Lipitor/ASA/Toprol, call with recurrent CP    Chronic diastolic congestive heart failure (HCC)  Wt Readings from Last 3 Encounters:   09/23/19 73 5 kg (162 lb)   09/13/19 73 9 kg (163 lb)   09/12/19 71 9 kg (158 lb 8 2 oz)       No current s/sx of exacerbation, on ACE/beta blocker/statin/ASA/Lasix, just saw Cardio and no studies ordered and no meds changed, con't watching weights and call with any s/sx of exacerbation      Essential hypertension  Bp at goal today, con't current meds    Type 2 diabetes mellitus without complication, without long-term current use of insulin ([facility] )  Lab Results   Component Value Date    HGBA1C 5 8 (H) 09/20/2019       No results for input(s): POCGLU in the last 72 hours  Blood Sugar Average: Last 72 hrs: 120's fasting, 150's after meals, DM type 2 w/o complications - controlled -A1C now low at 5 8, con't watching sugars/carbs and keep active, decrease Metformin XR to 500 mg 1 tab PO q day - may alternate qod with the 750 mg until he runs out, recheck BW in 6 mo - will order at next appt, on statin/ACE, foot exam done 12/18 and eye exam done 4/18 - had eye appt 7/19 but optho called and they DID NOT DO A DILATED DIABETIC EXAM BUT STATES THEY WILL AT PTS NEXT APPT IN [facility] [address]         Diagnoses and all orders for this visit:    Hospital discharge follow-up    Chest pressure    Chronic diastolic congestive heart failure ([facility] )    Type 2 diabetes mellitus without complication, without long-term current use of insulin (HCC)  -     metFORMIN (GLUCOPHAGE-XR) 500 mg 24 hr tablet;  Take 1 tablet (500 mg total) by mouth daily with dinner    Essential hypertension      Colonoscopy 3/15 - 3 yrs - knows it is overdue and will call to schedule    Pt is agreeable to flu vaccine - given    Subjective:      Patient ID: Altaf Shepherd is a 68 y o  male  HPI Pt here for ALBER/hospital follow up  Pt was admitted to [facility] from 9/11/19 to 9/12/19  Records were reviewed by myself in detail and events are summarized below  TCM Call (since 8/23/2019)     Date and time call was made  9/13/2019 10:56 AM    Hospital care reviewed  Records reviewed    Patient was hospitialized at  [address] [facility]    Date of Admission  09/11/19    Date of discharge  09/12/19    Diagnosis  Chest pressure    Disposition  Home    Were the patients medications reviewed and updated  No    Current Symptoms  None      TCM Call (since 8/23/2019)     Post hospital issues  None    Should patient be enrolled in anticoag monitoring? No    Scheduled for follow up? Yes    Patients specialists  Cardiologist    Did you obtain your prescribed medications  No    Why were you unable to obtain your medications  No new medications    Do you need help managing your prescriptions or medications  No    Is transportation to your appointment needed  No    I have advised the patient to call PCP with any new or worsening symptoms  Dyllan Lassiter        Pt presented to [facility] on 9/11/19 with c/o SOB and chest pressure  He had just been seen in the ED a few weeks prior for CP as well  In the ED BP was up at 177/74 but rest of vitals were wnl  Exam was only notable for known murmur  BW showed BNP of 1589 but a normal trop/CBC/CMP was noted  CXR showed NAD  ECG was read as NSR with intraventricular conduction delay with ST elevation in V2-V3 and T wave inversion at aVL  Pt was admitted with CP/unstable angina  Cardio was consulted  Troponin was neg x 3  Pt had a stress test on 9/12/19 and a small infarct in the apical region was noted as well as global L ventricular hypokinesis with an EF of 40%    Cardio states this was unchanged from previous stress test   Pt was discharged home after the stress on 9/12/19  Med list was reviewed in detail with pt today  Pt saw Cardio for follow up 9/13/19  No changes to meds were made and no additional studies were ordered  He was told to f/u in 6 mos  Pt notes no recurrent chest pressure since then  He denies palp/LE edema/SOB  He notes no wgt changes  BW from 9/20/19 was reviewed  FBS/a1C 102/5 8  Rest of CMP/TSH/urine microalbumin:cr ratio were wnl, CBC with borderline low hgb but rest of panel was wnl  Def of controlled vs uncontrolled DM was reviewed  Diet was reviewed - wgt down 9 lbs from April 2019  He is taking his DM meds as directed  He does check his sugars intermittently and readings were reviewed - 120's fasting and 150's after meals  He is UTD on foot (12/18) and states he had his eye exam a month ago (no result in chart)  He is on statin and ACE  BP at goal today and meds were reviewed and no changes have occurred  He denies missing doses of meds or SE with the meds  He does check his BP outside the office - cuff and readings were brought in and reviewed  Home cuff was checked today and read 135/72 and right after our cuff read 130/68  His home Bp readings were reviewed - averaging 130's/60's  He notes no frequent Ha's/double vision/CP  He does note intermittent dizziness but has had no falls with the dizziness  Colonoscopy 3/15 - 3 yrs    Pt is agreeable to flu vaccine      Review of Systems   Constitutional: Negative for chills and fever  HENT: Negative for congestion and sinus pain  Eyes: Negative for pain and visual disturbance  Respiratory: Negative for cough, shortness of breath and wheezing  Cardiovascular: Negative for chest pain, palpitations and leg swelling  Gastrointestinal: Positive for diarrhea  Negative for abdominal pain, blood in stool, constipation, nausea and vomiting  Endocrine: Negative for polydipsia and polyuria     Genitourinary: Negative for difficulty urinating and dysuria  Musculoskeletal: Negative for arthralgias and myalgias  Skin: Negative for rash and wound  Neurological: Negative for dizziness and headaches  Hematological: Does not bruise/bleed easily  Psychiatric/Behavioral: Negative for behavioral problems and confusion  Objective:    /68 (BP Location: Right arm, Patient Position: Sitting, Cuff Size: Standard)   Pulse 69   Temp (!) 97 1 °F (36 2 °C)   Ht 5' 11" (1 803 m)   Wt 73 5 kg (162 lb)   BMI 22 59 kg/m²      Physical Exam   Constitutional: He appears well-developed  No distress  thin   HENT:   Head: Normocephalic and atraumatic  Mouth/Throat: No oropharyngeal exudate  Eyes: Conjunctivae are normal  Right eye exhibits no discharge  Left eye exhibits no discharge  Neck: Neck supple  No tracheal deviation present  Cardiovascular: Normal rate and regular rhythm  Murmur heard  Pulmonary/Chest: Effort normal and breath sounds normal  No respiratory distress  He has no wheezes  He has no rales  Abdominal: Soft  He exhibits no distension  There is no tenderness  There is no rebound and no guarding  Musculoskeletal: He exhibits no deformity  Neurological: He is alert  He exhibits normal muscle tone  Skin: Skin is warm and dry  No rash noted  Psychiatric: He has a normal mood and affect  His behavior is normal    Nursing note and vitals reviewed

## 2019-09-25 DIAGNOSIS — E11.9 TYPE 2 DIABETES MELLITUS WITHOUT COMPLICATION, WITHOUT LONG-TERM CURRENT USE OF INSULIN (HCC): ICD-10-CM

## 2019-09-25 RX ORDER — METFORMIN HYDROCHLORIDE 750 MG/1
TABLET, EXTENDED RELEASE ORAL
Qty: 90 TABLET | Refills: 2 | OUTPATIENT
Start: 2019-09-25

## 2019-10-01 DIAGNOSIS — R07.9 CHEST PAIN: ICD-10-CM

## 2019-10-01 DIAGNOSIS — E11.9 TYPE 2 DIABETES MELLITUS WITHOUT COMPLICATION, WITHOUT LONG-TERM CURRENT USE OF INSULIN (HCC): ICD-10-CM

## 2019-10-01 RX ORDER — METFORMIN HYDROCHLORIDE 500 MG/1
500 TABLET, EXTENDED RELEASE ORAL
Qty: 90 TABLET | Refills: 2 | Status: SHIPPED | OUTPATIENT
Start: 2019-10-01 | End: 2020-06-24

## 2019-10-01 RX ORDER — FUROSEMIDE 20 MG/1
TABLET ORAL
Qty: 60 TABLET | Refills: 1 | Status: SHIPPED | OUTPATIENT
Start: 2019-10-01 | End: 2019-10-29 | Stop reason: SDUPTHER

## 2019-10-01 NOTE — TELEPHONE ENCOUNTER
Patient was under the understanding that his metformin was supposed to be changed to 500 mg from 750 mg  Went to [facility] and they do not have anything  Can we verify if this change is accurate and send the prescription please?

## 2019-10-11 ENCOUNTER — OFFICE VISIT (OUTPATIENT)
Dept: GASTROENTEROLOGY | Facility: CLINIC | Age: 74
End: 2019-10-11
Payer: COMMERCIAL

## 2019-10-11 VITALS
SYSTOLIC BLOOD PRESSURE: 124 MMHG | HEIGHT: 71 IN | WEIGHT: 167 LBS | DIASTOLIC BLOOD PRESSURE: 64 MMHG | BODY MASS INDEX: 23.38 KG/M2 | HEART RATE: 70 BPM

## 2019-10-11 DIAGNOSIS — R13.10 DYSPHAGIA, UNSPECIFIED TYPE: Primary | ICD-10-CM

## 2019-10-11 DIAGNOSIS — Z95.1 HX OF CABG: ICD-10-CM

## 2019-10-11 DIAGNOSIS — Z85.038 PERSONAL HISTORY OF COLON CANCER: ICD-10-CM

## 2019-10-11 DIAGNOSIS — I50.32 CHRONIC DIASTOLIC CONGESTIVE HEART FAILURE (HCC): ICD-10-CM

## 2019-10-11 DIAGNOSIS — R07.89 CHEST PRESSURE: ICD-10-CM

## 2019-10-11 DIAGNOSIS — K21.9 GASTROESOPHAGEAL REFLUX DISEASE, ESOPHAGITIS PRESENCE NOT SPECIFIED: ICD-10-CM

## 2019-10-11 DIAGNOSIS — C18.0 MALIGNANT TUMOR OF CECUM (HCC): ICD-10-CM

## 2019-10-11 PROCEDURE — 99204 OFFICE O/P NEW MOD 45 MIN: CPT | Performed by: NURSE PRACTITIONER

## 2019-10-11 NOTE — PATIENT INSTRUCTIONS
Cut food into small pieces, take small bites, eat slowly and chew thoroughly  Try to eat smaller more frequent meals and avoid late-night eating (within 2-3 hours of bed)  Tried elevate the head of year bed at night with blocks or mattress wedge  You will be scheduled for an EGD and colonoscopy at [facility] setting due to your heart history    Chronic Dysphagia   WHAT YOU NEED TO KNOW:   What is chronic dysphagia? Chronic dysphagia is trouble swallowing  It occurs when you have trouble moving food or liquid down your esophagus to your stomach  It may occur when you eat, drink, or any time you try to swallow  What causes chronic dysphagia? · Narrowing of your esophagus caused by acid reflux or tumors    · Nerve or muscle problems that slow the movement of food    · Brain injury, or conditions that affect the nervous system such as stroke, dementia, or Parkinson disease    · Radiation therapy or head and neck surgery    · Certain medicines such as antihistamines, diuretics, antidepressants, and blood pressure or antinausea medicines  What other signs and symptoms may occur with chronic dysphagia? · Drooling, coughing after swallowing, or spitting up food    · Hoarse or wet-sounding voice while eating or drinking     · Feeling like food is stuck in your throat or pressure in your chest after you eat  How is chronic dysphagia diagnosed? Your healthcare provider may ask if you only have trouble swallowing when you eat or drink, or any time you try to swallow  You may also need any of the following tests:  · A water swallow screening test  will show how well you swallow thinner liquids, such as water  Thinner liquids can make you choke more easily than thicker liquids  This test may show signs of dysphagia and aspiration (movement of liquid into your lungs)  It can be used to help healthcare providers decide if you need other tests       · Other swallow tests  may show which parts of your throat or esophagus are not working well  These tests may include x-rays of your throat and esophagus  You may be given a thick liquid called barium to help your esophagus show up better on x-rays  These tests may also show if the position of your head affects the way you swallow  · Endoscopy  is a procedure that may show narrowing or inflammation in your esophagus  · Manometry  measures the pressure within the esophagus and stomach  · pH monitoring  is used to check your throat for acid reflux  How is chronic dysphagia treated? Treatment will depend on the cause of your dysphagia  You may need medicine to reduce acid reflux or muscle spasms in your throat  You may also need any of the following:  · Diet changes  may reduce choking problems  Your healthcare provider may show you how to thicken liquids or soften foods to make them easier to swallow  · Swallowing therapy  can teach you different ways of swallowing by using different head and body positions  You may be taught exercises to strengthen the muscles that help you swallow  · Surgery  may be needed to widen your esophagus or treat other medical conditions that cause dysphagia  When should I contact my healthcare provider? · You lose weight without trying  · Your signs and symptoms get worse, or you have new signs or symptoms  · You have signs or symptoms of dehydration, such as increased thirst, dark yellow urine, or little or no urine  · You get colds often    · You have questions or concerns about your condition or care  When should I seek care immediately or call 911? · You cannot eat or drink liquids at all  · You have chest pain  · You have shortness of breath  CARE AGREEMENT:   You have the right to help plan your care  Learn about your health condition and how it may be treated  Discuss treatment options with your caregivers to decide what care you want to receive  You always have the right to refuse treatment   The above information is an  only  It is not intended as medical advice for individual conditions or treatments  Talk to your doctor, nurse or pharmacist before following any medical regimen to see if it is safe and effective for you  © 2017 [address] [employer] Information is for End User's use only and may not be sold, redistributed or otherwise used for commercial purposes  All illustrations and images included in CareNotes® are the copyrighted property of [employer]  or Drew Melendez  Gastroesophageal Reflux Disease   WHAT YOU NEED TO KNOW:   What is gastroesophageal reflux disease? Gastroesophageal reflux occurs when acid and food in the stomach back up into the esophagus  Gastroesophageal reflux disease (GERD) is reflux that occurs more than twice a week for a few weeks  It usually causes heartburn and other symptoms  GERD can cause other health problems over time if it is not treated  What causes GERD? GERD often occurs when the lower muscle (sphincter) of the esophagus does not close properly  The sphincter normally opens to let food into the stomach  It then closes to keep food and stomach acid in the stomach  If the sphincter does not close properly, stomach acid and food back up (reflux) into the esophagus  The following may increase your risk for GERD:  · Certain foods such as spicy foods, chocolate, foods that contain caffeine, peppermint, and fried foods    · Hiatal hernia    · Certain medicines such as calcium channel blockers (used to treat high blood pressure), allergy medicines, sedatives, or antidepressants    · Pregnancy or obesity    · Lying down after a meal    · Drinking alcohol or smoking  What are the signs and symptoms of GERD? Heartburn is the most common symptom of GERD  You may feel burning pain in your chest or below the breast bone  This usually occurs after meals and spreads to your neck, jaw, or shoulder   The pain gets better when you change positions  You may also have any of the following:  · Bitter or acid taste in your mouth    · Dry cough    · Trouble swallowing or pain with swallowing    · Hoarseness or sore throat    · Frequent burping or hiccups    · Feeling of fullness soon after you start eating  How is GERD diagnosed? Your healthcare provider will ask about your symptoms and when they started  Tell him or her about other medical conditions you have, your eating habits, and your activities  You may also need any of the following:  · Esophageal pH monitoring  is used to place a small probe inside your esophagus and stomach to check the amount of acid  · An endoscopy  is a procedure used to look at the inside of your esophagus and stomach  An endoscope is a bendable tube with a light and camera on the end  Your healthcare provider may remove a small sample of tissue and send it to a lab for tests  · Upper GI x-rays  are done to take pictures of your stomach and intestines (bowel)  You may be given a chalky liquid to drink before the pictures are taken  This liquid helps your stomach and intestines show up better on the x-rays  · Esophageal manometry  is a test that shows how your esophagus pushes food and fluid to your stomach  It also shows the pressures in your esophagus and stomach  It may show a hiatal hernia  How is GERD treated? · Medicines  are used to decrease stomach acid  Medicine may also be used to help your lower esophageal sphincter and stomach contract (tighten) more  · Surgery  is done to wrap the upper part of the stomach around the esophageal sphincter  This will strengthen the sphincter and prevent reflux  How can I help manage GERD? · Do not have foods or drinks that may increase heartburn  These include chocolate, peppermint, fried or fatty foods, drinks that contain caffeine, or carbonated drinks (soda)  Other foods include spicy foods, onions, tomatoes, and tomato-based foods   Do not have foods or drinks that can irritate your esophagus, such as citrus fruits, juices, and alcohol  · Do not eat large meals  When you eat a lot of food at one time, your stomach needs more acid to digest it  Eat 6 small meals each day instead of 3 large ones, and eat slowly  Do not eat meals 2 to 3 hours before bedtime  · Elevate the head of your bed  Place 6-inch blocks under the head of your bed frame  You may also use more than one pillow under your head and shoulders while you sleep  · Maintain a healthy weight  If you are overweight, weight loss may help relieve symptoms of GERD  · Do not smoke  Smoking weakens the lower esophageal sphincter and increases the risk of GERD  Ask your healthcare provider for information if you currently smoke and need help to quit  E-cigarettes or smokeless tobacco still contain nicotine  Talk to your healthcare provider before you use these products  · Do not wear clothing that is tight around your waist   Tight clothing can put pressure on your stomach and cause or worsen GERD symptoms  When should I seek immediate care? · You feel full and cannot burp or vomit  · You have severe chest pain and sudden trouble breathing  · Your bowel movements are black, bloody, or tarry-looking  · Your vomit looks like coffee grounds or has blood in it  When should I contact my healthcare provider? · You vomit large amounts, or you vomit often  · You have trouble breathing after you vomit  · You have trouble swallowing, or pain with swallowing  · You are losing weight without trying  · Your symptoms get worse or do not improve with treatment  · You have questions or concerns about your condition or care  CARE AGREEMENT:   You have the right to help plan your care  Learn about your health condition and how it may be treated  Discuss treatment options with your caregivers to decide what care you want to receive  You always have the right to refuse treatment  The above information is an  only  It is not intended as medical advice for individual conditions or treatments  Talk to your doctor, nurse or pharmacist before following any medical regimen to see if it is safe and effective for you  © 2017 [address] [employer] Information is for End User's use only and may not be sold, redistributed or otherwise used for commercial purposes  All illustrations and images included in CareNotes® are the copyrighted property of [employer] Inc  or Drew Melendez

## 2019-10-11 NOTE — PROGRESS NOTES
[facility] - Outpatient Consultation  Quique Amaya 68 y o  male MRN: [id number]  Encounter: [id number]    ASSESSMENT AND PLAN:      1  Dysphagia, unspecified type  Significant solid food dysphagia often requiring regurgitation of the stuck food  Described at that time as longstanding since undergoing lymph node removal and radiation in 1998 and with both solids and liquids although the patient says only solids at this time  This issue was addressed during hospitalization November, 2018 and an EGD with dilatation was recommended at that time as he had had a VSE which the patient reports he flunked    Will [facility] setting, to evaluate for possible esophageal stricture, Schatzki's ring, esophagitis, less likely esophageal neoplasm  2  Gastroesophageal reflux disease, esophagitis presence not specified  Symptoms are stable on omeprazole 20 mg daily  Evaluate for PUD, gastritis, esophagitis, Roberts's  Will schedule EGD-[facility] setting due to patient's multiple cardiac comorbidities  3  Personal history of colon cancer  4  Malignant tumor of cecum ([address] )  High risk  Last colonoscopy was done at [facility] 03/12/2015 and showed sigmoid and descending diverticulosis but otherwise normal   There was a sigmoid polyp removed pathology is not immediately available  Three year recall was recommended so he is overdue for screening  5  Chronic diastolic congestive heart failure (HCC)  6  Chest pressure  7  Hx of CABG  His previous scheduled colonoscopy was postponed due to chest pressure which was evaluated 9/11 at [facility] and culminated in hospital admission with Cardiology workup including stress test   Previous history of CABG, AVR, MAZE, atrial fibrillation and CHF  He was last seen by Dr Gardner Cancer, Cardiology on 09/13 and will return for follow-up in about 6 months    He is currently stable on his medications per Cardiology  Will have endoscopic procedures performed in hospital setting due to his multiple cardiac comorbidities  Followup Appointment:  4 weeks  ______________________________________________________________________    Chief Complaint   Patient presents with    Due for colonoscopy       HPI:   Regina Prabhakar is a 68 y o   male who presents for evaluation to schedule his overdue screening colonoscopy  He remains on 3 year recall for history of colon, cecal cancer for which he had a resection in the past   He reports being in his usual state of health  He has 1 soft to occasionally loose bowel movement daily  Reports the loose stools are exclusively associated with food indiscretions  Denies fatigue, fever/chills, nausea/vomiting, chest pain, shortness of breath, melena, hematochezia, abdominal or rectal pain  The patient does report solid food dysphagia every day requiring occasional regurgitation of food  He often has trouble swallowing meats, grapes, pills  The food sometimes will go down if he drinks some water  Denies problem with liquids although this has been documented as an issue in the past   He was evaluated with VSE in the course of the hospitalization at [facility] in November, 2018  At that time an EGD with possible dilation was recommended  He acknowledges that he never followed through with this procedure  He does not have any heartburn, reflux symptoms on omeprazole 20 mg daily but does acknowledge some worsening of his dysphagia  He says he just deals with it because he feels he is just so use to it      Historical Information   Past Medical History:   Diagnosis Date    Arthritis     Atrial fibrillation (HCC)     Cancer (HCC)     Lymphoma    Cataract     CHF (congestive heart failure) (HCC)     CHF (congestive heart failure) (HCC)     Colitis     [facility] [address] [facility]     Coronary artery disease     Diabetes mellitus type II, non insulin dependent ([email or web address]     Fatty liver     GERD (gastroesophageal reflux disease)     Hepatic steatosis     History of chemotherapy     History of radiation therapy     History of shingles 2018    Hyperlipidemia     Hypertension     [facility] [address]     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[address]     Pneumonia     Skin cancer      Past Surgical History:   Procedure Laterality Date    AORTIC VALVE REPLACEMENT      COLECTOMY      COLONOSCOPY      DENTAL SURGERY      KNEE SURGERY      LYMPHADENECTOMY      OTHER SURGICAL HISTORY      MAZE PROCEDURE    NJ TOTAL KNEE ARTHROPLASTY Left 1/28/2019    Procedure: ARTHROPLASTY LEFT KNEE TOTAL;  Surgeon: Angeles Mckeon MD;  Location:  MAIN OR;  Service: Orthopedics    SKIN BIOPSY       Social History     Substance and Sexual Activity   Alcohol Use Yes    Frequency: Monthly or less    Drinks per session: 1 or 2    Binge frequency: Never    Comment: has not drank in over a month     Social History     Substance and Sexual Activity   Drug Use No     Social History     Tobacco Use   Smoking Status Former Smoker    Packs/day: 1 00    Years: 40 00    Pack years: 40 00    Types: Cigarettes    Last attempt to quit: 2009    Years since quitting: 10 7   Smokeless Tobacco Never Used     Family History   Problem Relation Age of Onset    Heart block Family     Coronary artery disease Mother     Thrombosis Mother     Hypertension Mother    [place] Arthritis Mother     Hyperlipidemia Mother     Diabetes Father     Hypertension Father     Arthritis Father     Sudden death Father         cardiac    Colon cancer Paternal Grandfather     Breast cancer Sister     Heart disease Brother         Coronary stents       Meds/Allergies     Current Outpatient Medications:     aspirin 81 MG tablet    atorvastatin (LIPITOR) 40 mg tablet    furosemide (LASIX) 20 mg tablet    lisinopril (ZESTRIL) 20 mg tablet    metFORMIN (GLUCOPHAGE-XR) 500 mg 24 hr tablet    metoprolol succinate (TOPROL-XL) 100 mg 24 hr tablet   Multiple Vitamin (MULTI-VITAMIN) tablet    Multiple Vitamins-Minerals (ICAPS AREDS 2) CAPS    nitroglycerin (NITROSTAT) 0 3 mg SL tablet    omeprazole (PRILOSEC) 20 mg delayed release capsule    ascorbic acid (VITAMIN C) 500 mg tablet    Allergies   Allergen Reactions    Ceftin [Cefuroxime] Itching     However, has tolerated Cefazolin and Pip-Tazo since, which have different side chains  Be cautious with / avoid 2nd, 3rd, or 4th Gen Cephs that have similar side chains to Cefuroxime  PHYSICAL EXAM:    Blood pressure 124/64, pulse 70, height 5' 11" (1 803 m), weight 75 8 kg (167 lb)  Body mass index is 23 29 kg/m²  General Appearance: NAD, cooperative, alert  Head:   Normocephalic, atraumatic  Eyes: Anicteric, PERRLA, EOMI  ENT:  Normal external appearance, normal mucosa  Neck:  Supple, symmetrical, trachea midline,   Resp:  Clear to auscultation bilaterally; no rales, rhonchi or wheezing; respirations unlabored   CV:  S1 S2, Regular rate and rhythm; no rub, or gallop   +murmur to auscultation  GI:  Soft, very mild L LQ tenderness with palpation, otherwise nontender, non-distended; normal bowel sounds; no masses, no organomegaly   Rectal: Deferred  Musculoskeletal: No cyanosis, clubbing but has some trace edema to LLE  Normal ROM  Skin:  No jaundice, rashes, or lesions   Heme/Lymph: No palpable cervical lymphadenopathy  Psych: Normal affect, good eye contact  Neuro: No gross deficits, AAOx3    Lab Results:  Reviewed results from recent hospitalization as well as results from [employer] dated 09/20/2019      Lab Results   Component Value Date    WBC 8 3 09/20/2019    HGB 12 9 (L) 09/20/2019    HCT 39 9 09/20/2019    MCV 81 09/20/2019     09/20/2019     Lab Results   Component Value Date     05/19/2017    K 3 8 09/20/2019    CL 97 09/20/2019    CO2 31 (H) 09/20/2019    ANIONGAP 7 02/22/2014    BUN 17 09/20/2019    CREATININE 1 00 09/20/2019    GLUCOSE 204 (H) 10/13/2017    GLUF 167 (H) 08/05/2016    CALCIUM 9 2 09/12/2019    AST 14 09/20/2019    ALT 9 09/20/2019    ALKPHOS 121 (H) 09/11/2019    PROT 6 5 05/19/2017    BILITOT 0 9 05/19/2017    EGFR 71 09/12/2019     No results found for: IRON, TIBC, FERRITIN  Lab Results   Component Value Date    LIPASE 173 11/25/2018       Radiology Results:   Xr Chest 2 Views    Result Date: 9/12/2019  Narrative: CHEST INDICATION:   Chest Pain  COMPARISON:  8/20/2019  EXAM PERFORMED/VIEWS:  XR CHEST PA & LATERAL  The frontal view was performed utilizing dual energy radiographic technique  Images: 4 FINDINGS:  There are median sternotomy wires indicating prior cardiac surgery  Cardiomediastinal silhouette appears unremarkable  The lungs are clear  No pneumothorax or pleural effusion  Osseous structures appear within normal limits for patient age  Impression: No acute cardiopulmonary disease  Workstation performed: [id number]       REVIEW OF SYSTEMS:    CONSTITUTIONAL: Denies any fever, chills, rigors, and weight loss  HEENT: No earache or tinnitus  Denies hearing loss or visual disturbances  CARDIOVASCULAR: No chest pain or palpitations  RESPIRATORY: Denies any cough, hemoptysis, shortness of breath or dyspnea on exertion  GASTROINTESTINAL: As noted in the History of Present Illness  GENITOURINARY: No problems with urination  Denies any hematuria or dysuria  NEUROLOGIC: No dizziness or vertigo, denies headaches  MUSCULOSKELETAL: Denies any muscle pain  Reports painful joints and joint stiffness  SKIN: Denies skin rashes or itching  ENDOCRINE: Denies excessive thirst  Denies intolerance to heat or cold  PSYCHOSOCIAL: Denies depression or anxiety  Denies any recent memory loss

## 2019-10-11 NOTE — LETTER
October 11, 2019     Brenton Griffith, [address] [facility] [address]  [address]  [address] [address] [address]    Patient: Ever Oconnor   YOB: 1945   Date of Visit: 10/11/2019       Dear Dr Divine Madrid: Thank you for referring Maya Lilly to me for evaluation  Below are my notes for this consultation  If you have questions, please do not hesitate to call me  I look forward to following your patient along with you  Sincerely,        DUNG Lilly        CC: MD Angélica Whitman, [address] [facility]  10/11/2019  7:18 PM  Incomplete    [address] [facility] - Outpatient Consultation  Ever Oconnor 68 y o  male MRN: [id number]  Encounter: [id number]    ASSESSMENT AND PLAN:      1  Dysphagia, unspecified type  Significant solid food dysphagia often requiring regurgitation of the stuck food  Described at that time as longstanding since undergoing lymph node removal and radiation in 1998 and with both solids and liquids although the patient says only solids at this time  This issue was addressed during hospitalization November, 2018 and an EGD with dilatation was recommended at that time as he had had a VSE which the patient reports he flunked    Will [facility], to evaluate for possible esophageal stricture, Schatzki's ring, esophagitis, less likely esophageal neoplasm  2  Gastroesophageal reflux disease, esophagitis presence not specified  Symptoms are stable on omeprazole 20 mg daily  Evaluate for PUD, gastritis, esophagitis, Roberts's  Will schedule EGD-[facility] setting due to patient's multiple cardiac comorbidities  3  Personal history of colon cancer  4  Malignant tumor of cecum ([address] )  High risk    Last colonoscopy was done at [facility] 03/12/2015 and showed sigmoid and descending diverticulosis but otherwise normal   There was a sigmoid polyp removed pathology is not immediately available  Three year recall was recommended so he is overdue for screening  5  Chronic diastolic congestive heart failure (HCC)  6  Chest pressure  7  Hx of CABG  His previous scheduled colonoscopy was postponed due to chest pressure which was evaluated 9/11 at [facility] and culminated in hospital admission with Cardiology workup including stress test   Previous history of CABG, AVR, MAZE, atrial fibrillation and CHF  He was last seen by Dr Horace Infante, Cardiology on 09/13 and will return for follow-up in about 6 months  He is currently stable on his medications per Cardiology  Will have endoscopic procedures performed in hospital setting due to his multiple cardiac comorbidities  Followup Appointment:  4 weeks  ______________________________________________________________________    Chief Complaint   Patient presents with    Due for colonoscopy       HPI:   Daria Brush is a 68 y o   male who presents for evaluation to schedule his overdue screening colonoscopy    He remains on 3 year recall for history of rectal cancer for which he had a resection in the past     Historical Information   Past Medical History:   Diagnosis Date    Arthritis     Atrial fibrillation [facility]     Cancer ([facility]     Lymphoma    Cataract     CHF (congestive heart failure) (HCC)     CHF (congestive heart failure) (HCC)     Colitis     Colon cancer ([address] )     Coronary artery disease     Diabetes mellitus type II, non insulin dependent (HCC)     Fatty liver     GERD (gastroesophageal reflux disease)     Hepatic steatosis     History of chemotherapy     History of radiation therapy     History of shingles 2018    Hyperlipidemia     Hypertension     Lymphoma ([address] )     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[facility] [facility]     Pneumonia     Skin cancer      Past Surgical History:   Procedure Laterality Date    AORTIC VALVE REPLACEMENT      COLECTOMY      COLONOSCOPY      DENTAL SURGERY      KNEE SURGERY      LYMPHADENECTOMY      OTHER SURGICAL HISTORY      MAZE PROCEDURE    TX TOTAL KNEE ARTHROPLASTY Left 1/28/2019    Procedure: ARTHROPLASTY LEFT KNEE TOTAL;  Surgeon: Armando Celaya MD;  Location: [facility];  Service: Orthopedics    SKIN BIOPSY       Social History     Substance and Sexual Activity   Alcohol Use Yes    Frequency: Monthly or less    Drinks per session: 1 or 2    Binge frequency: Never    Comment: has not drank in over a month     Social History     Substance and Sexual Activity   Drug Use No     Social History     Tobacco Use   Smoking Status Former Smoker    Packs/day: 1 00    Years: 40 00    Pack years: 40 00    Types: Cigarettes    Last attempt to quit: 2009    Years since quitting: 10 7   Smokeless Tobacco Never Used     Family History   Problem Relation Age of Onset    Heart block Family     Coronary artery disease Mother     Thrombosis Mother     Hypertension Mother    Tavo Jakub Arthritis Mother     Hyperlipidemia Mother     Diabetes Father     Hypertension Father     Arthritis Father     Sudden death Father         cardiac    Colon cancer Paternal Grandfather     Breast cancer Sister     Heart disease Brother         Coronary stents       Meds/Allergies     Current Outpatient Medications:     aspirin 81 MG tablet    atorvastatin (LIPITOR) 40 mg tablet    furosemide (LASIX) 20 mg tablet    lisinopril (ZESTRIL) 20 mg tablet    metFORMIN (GLUCOPHAGE-XR) 500 mg 24 hr tablet    metoprolol succinate (TOPROL-XL) 100 mg 24 hr tablet    Multiple Vitamin (MULTI-VITAMIN) tablet    Multiple Vitamins-Minerals (ICAPS AREDS 2) CAPS    nitroglycerin (NITROSTAT) 0 3 mg SL tablet    omeprazole (PRILOSEC) 20 mg delayed release capsule    ascorbic acid (VITAMIN C) 500 mg tablet    Allergies   Allergen Reactions    Ceftin [Cefuroxime] Itching     However, has tolerated Cefazolin and Pip-Tazo since, which have different side chains   Be cautious with / avoid 2nd, 3rd, or 4th Gen Cephs that have similar side chains to Cefuroxime  PHYSICAL EXAM:    Blood pressure 124/64, pulse 70, height 5' 11" (1 803 m), weight 75 8 kg (167 lb)  Body mass index is 23 29 kg/m²  General Appearance: NAD, cooperative, alert  Head:   Normocephalic, atraumatic  Eyes: Anicteric, PERRLA, EOMI  ENT:  Normal external appearance, normal mucosa  Neck:  Supple, symmetrical, trachea midline,   Resp:  Clear to auscultation bilaterally; no rales, rhonchi or wheezing; respirations unlabored   CV:  S1 S2, Regular rate and rhythm; no rub, or gallop   +murmur to auscultation  GI:  Soft,  very mild L LQ tenderness with palpation, otherwise nontender, non-distended; normal bowel sounds; no masses, no organomegaly   Rectal: Deferred  Musculoskeletal: No cyanosis, clubbing but has some trace edema to LLE  Normal ROM  Skin:  No jaundice, rashes, or lesions   Heme/Lymph: No palpable cervical lymphadenopathy  Psych: Normal affect, good eye contact  Neuro: No gross deficits, AAOx3    Lab Results:  Reviewed results from recent hospitalization as well as results from [facility] dated 09/20/2019  Lab Results   Component Value Date    WBC 8 3 09/20/2019    HGB 12 9 (L) 09/20/2019    HCT 39 9 09/20/2019    MCV 81 09/20/2019     09/20/2019     Lab Results   Component Value Date     05/19/2017    K 3 8 09/20/2019    CL 97 09/20/2019    CO2 31 (H) 09/20/2019    ANIONGAP 7 02/22/2014    BUN 17 09/20/2019    CREATININE 1 00 09/20/2019    GLUCOSE 204 (H) 10/13/2017    GLUF 167 (H) 08/05/2016    CALCIUM 9 2 09/12/2019    AST 14 09/20/2019    ALT 9 09/20/2019    ALKPHOS 121 (H) 09/11/2019    PROT 6 5 05/19/2017    BILITOT 0 9 05/19/2017    EGFR 71 09/12/2019     No results found for: IRON, TIBC, FERRITIN  Lab Results   Component Value Date    LIPASE 173 11/25/2018       Radiology Results:   Xr Chest 2 Views    Result Date: 9/12/2019  Narrative: CHEST INDICATION:   Chest Pain  COMPARISON:  8/20/2019   EXAM PERFORMED/VIEWS:  XR CHEST PA & LATERAL  The frontal view was performed utilizing dual energy radiographic technique  Images: 4 FINDINGS:  There are median sternotomy wires indicating prior cardiac surgery  Cardiomediastinal silhouette appears unremarkable  The lungs are clear  No pneumothorax or pleural effusion  Osseous structures appear within normal limits for patient age  Impression: No acute cardiopulmonary disease  Workstation performed: [id number]       REVIEW OF SYSTEMS:    CONSTITUTIONAL: Denies any fever, chills, rigors, and weight loss  HEENT: No earache or tinnitus  Denies hearing loss or visual disturbances  CARDIOVASCULAR: No chest pain or palpitations  RESPIRATORY: Denies any cough, hemoptysis, shortness of breath or dyspnea on exertion  GASTROINTESTINAL: As noted in the History of Present Illness  GENITOURINARY: No problems with urination  Denies any hematuria or dysuria  NEUROLOGIC: No dizziness or vertigo, denies headaches  MUSCULOSKELETAL: Denies any muscle pain  Reports painful joints and joint stiffness  SKIN: Denies skin rashes or itching  ENDOCRINE: Denies excessive thirst  Denies intolerance to heat or cold  PSYCHOSOCIAL: Denies depression or anxiety  Denies any recent memory loss

## 2019-10-14 DIAGNOSIS — Z85.038 PERSONAL HISTORY OF COLON CANCER: Primary | ICD-10-CM

## 2019-10-15 ENCOUNTER — APPOINTMENT (OUTPATIENT)
Dept: RADIOLOGY | Facility: CLINIC | Age: 74
End: 2019-10-15
Payer: COMMERCIAL

## 2019-10-15 ENCOUNTER — OFFICE VISIT (OUTPATIENT)
Dept: OBGYN CLINIC | Facility: CLINIC | Age: 74
End: 2019-10-15
Payer: COMMERCIAL

## 2019-10-15 VITALS
SYSTOLIC BLOOD PRESSURE: 126 MMHG | WEIGHT: 167 LBS | HEIGHT: 71 IN | DIASTOLIC BLOOD PRESSURE: 78 MMHG | BODY MASS INDEX: 23.38 KG/M2 | HEART RATE: 80 BPM

## 2019-10-15 DIAGNOSIS — Z96.652 AFTERCARE FOLLOWING LEFT KNEE JOINT REPLACEMENT SURGERY: ICD-10-CM

## 2019-10-15 DIAGNOSIS — Z96.652 AFTERCARE FOLLOWING LEFT KNEE JOINT REPLACEMENT SURGERY: Primary | ICD-10-CM

## 2019-10-15 DIAGNOSIS — Z47.1 AFTERCARE FOLLOWING LEFT KNEE JOINT REPLACEMENT SURGERY: ICD-10-CM

## 2019-10-15 DIAGNOSIS — M17.11 PRIMARY LOCALIZED OSTEOARTHRITIS OF RIGHT KNEE: ICD-10-CM

## 2019-10-15 DIAGNOSIS — Z47.1 AFTERCARE FOLLOWING LEFT KNEE JOINT REPLACEMENT SURGERY: Primary | ICD-10-CM

## 2019-10-15 PROCEDURE — 99213 OFFICE O/P EST LOW 20 MIN: CPT | Performed by: PHYSICIAN ASSISTANT

## 2019-10-15 PROCEDURE — 20610 DRAIN/INJ JOINT/BURSA W/O US: CPT | Performed by: PHYSICIAN ASSISTANT

## 2019-10-15 PROCEDURE — 73562 X-RAY EXAM OF KNEE 3: CPT

## 2019-10-15 RX ORDER — LIDOCAINE HYDROCHLORIDE 10 MG/ML
7 INJECTION, SOLUTION INFILTRATION; PERINEURAL
Status: COMPLETED | OUTPATIENT
Start: 2019-10-15 | End: 2019-10-15

## 2019-10-15 RX ORDER — BETAMETHASONE SODIUM PHOSPHATE AND BETAMETHASONE ACETATE 3; 3 MG/ML; MG/ML
6 INJECTION, SUSPENSION INTRA-ARTICULAR; INTRALESIONAL; INTRAMUSCULAR; SOFT TISSUE
Status: COMPLETED | OUTPATIENT
Start: 2019-10-15 | End: 2019-10-15

## 2019-10-15 RX ADMIN — BETAMETHASONE SODIUM PHOSPHATE AND BETAMETHASONE ACETATE 6 MG: 3; 3 INJECTION, SUSPENSION INTRA-ARTICULAR; INTRALESIONAL; INTRAMUSCULAR; SOFT TISSUE at 09:16

## 2019-10-15 RX ADMIN — LIDOCAINE HYDROCHLORIDE 7 ML: 10 INJECTION, SOLUTION INFILTRATION; PERINEURAL at 09:16

## 2019-10-15 NOTE — ASSESSMENT & PLAN NOTE
Repeat cortisone injection was given to the right knee joint today  He tolerated the procedure well  Advised to apply cold compress today  Follow-up as needed if symptoms return

## 2019-10-15 NOTE — ASSESSMENT & PLAN NOTE
Patient is doing well status post left total knee arthroplasty on January 28, 2019  X-rays were reviewed with the patient  Advised patient he is to have antibiotics prior to all dental procedures  Follow-up in 1-2 years with repeat x-rays  All questions were answered to patient's satisfaction

## 2019-10-15 NOTE — PROGRESS NOTES
Assessment:     1  Aftercare following left knee joint replacement surgery    2  Primary localized osteoarthritis of right knee        Plan:  The patient was seen and examined by Dr Samuel Hicks and myself  Problem List Items Addressed This Visit        Musculoskeletal and Integument    Primary localized osteoarthritis of right knee     Repeat cortisone injection was given to the right knee joint today  He tolerated the procedure well  Advised to apply cold compress today  Follow-up as needed if symptoms return  Relevant Medications    lidocaine (XYLOCAINE) 1 % injection 7 mL (Completed)    betamethasone acetate-betamethasone sodium phosphate (CELESTONE) injection 6 mg (Completed)    Other Relevant Orders    Large joint arthrocentesis: R knee (Completed)       Other    Aftercare following left knee joint replacement surgery - Primary     Patient is doing well status post left total knee arthroplasty on January 28, 2019  X-rays were reviewed with the patient  Advised patient he is to have antibiotics prior to all dental procedures  Follow-up in 1-2 years with repeat x-rays  All questions were answered to patient's satisfaction  Relevant Orders    XR knee 3 vw left non injury         Subjective:     Patient ID: John Marin is a 68 y o  male  Chief Complaint: This is a 67-year-old white male who is status post left total knee arthroplasty on January 28, 2019  He is also following up for right knee osteoarthritis  The left knee is slowly feeling improvement  He continues to do home exercises  He is back doing all his normal activities  He feels occasional aching pain  The cortisone injection given to his right knee joint 6 months ago gave significant relief  It recently wore off and he would like a repeat injection today  Allergy:  Allergies   Allergen Reactions    Ceftin [Cefuroxime] Itching     However, has tolerated Cefazolin and Pip-Tazo since, which have different side chains   Be cautious with / avoid 2nd, 3rd, or 4th Gen Cephs that have similar side chains to Cefuroxime       Medications:  all current active meds have been reviewed  Past Medical History:  Past Medical History:   Diagnosis Date    Arthritis     Atrial fibrillation ([address] )     Cancer ([address] )     Lymphoma    Cataract     CHF (congestive heart failure) (HCC)     [facility]     Colitis     Colon cancer ([place] [address] )     Coronary artery disease     Diabetes mellitus type II, non insulin dependent ([place] [address] )     Fatty liver     GERD (gastroesophageal reflux disease)     Hepatic steatosis     History of chemotherapy     History of radiation therapy     History of shingles 2018    Hyperlipidemia     Hypertension     Lymphoma [facility]     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[address] )     Pneumonia     Skin cancer      Past Surgical History:  Past Surgical History:   Procedure Laterality Date    AORTIC VALVE REPLACEMENT      COLECTOMY      COLONOSCOPY      CORONARY ARTERY BYPASS GRAFT      DENTAL SURGERY      KNEE SURGERY      LYMPHADENECTOMY      OTHER SURGICAL HISTORY      MAZE PROCEDURE    LA TOTAL KNEE ARTHROPLASTY Left 1/28/2019    Procedure: ARTHROPLASTY LEFT KNEE TOTAL;  Surgeon: Juan Swift MD;  [facility];  Service: Orthopedics    SKIN BIOPSY       Family History:  Family History   Problem Relation Age of Onset    Heart block Family     Coronary artery disease Mother     Thrombosis Mother     Hypertension Mother    Quynh Credit Arthritis Mother     Hyperlipidemia Mother     Diabetes Father     Hypertension Father     Arthritis Father     Sudden death Father         cardiac    Colon cancer Paternal Grandfather     Breast cancer Sister     Heart disease Brother         Coronary stents     Social History:  Social History     Substance and Sexual Activity   Alcohol Use Yes    Frequency: Monthly or less    Drinks per session: 1 or 2    Binge frequency: Never    Comment: has not drank in over a month Social History     Substance and Sexual Activity   Drug Use No     Social History     Tobacco Use   Smoking Status Former Smoker    Packs/day: 1 00    Years: 40 00    Pack years: 40 00    Types: Cigarettes    Last attempt to quit: 2009    Years since quitting: 10 7   Smokeless Tobacco Never Used     Review of Systems   Constitutional: Negative  HENT: Negative  Eyes: Negative  Respiratory: Negative  Cardiovascular: Negative  Gastrointestinal: Negative  Endocrine: Negative  Genitourinary: Negative  Musculoskeletal: Positive for arthralgias and joint swelling  Skin: Negative  Allergic/Immunologic: Negative  Hematological: Negative  Psychiatric/Behavioral: Negative  Objective:  BP Readings from Last 1 Encounters:   10/15/19 126/78      Wt Readings from Last 1 Encounters:   10/15/19 75 8 kg (167 lb)      BMI:   Estimated body mass index is 23 29 kg/m² as calculated from the following:    Height as of this encounter: 5' 11" (1 803 m)  Weight as of this encounter: 75 8 kg (167 lb)  BSA:   Estimated body surface area is 1 95 meters squared as calculated from the following:    Height as of this encounter: 5' 11" (1 803 m)  Weight as of this encounter: 75 8 kg (167 lb)  Physical Exam   Constitutional: He is oriented to person, place, and time  He appears well-developed  HENT:   Head: Normocephalic and atraumatic  Eyes: Conjunctivae and EOM are normal    Neck: Neck supple  Musculoskeletal:        Right knee: He exhibits no effusion  Neurological: He is alert and oriented to person, place, and time  Skin: Skin is warm  Psychiatric: He has a normal mood and affect  Nursing note and vitals reviewed  Right Knee Exam     Muscle Strength   The patient has normal right knee strength  Tenderness   The patient is experiencing tenderness in the lateral joint line  Range of Motion   The patient has normal right knee ROM      Tests   Varus: negative Valgus: negative    Other   Erythema: absent  Sensation: normal  Pulse: present  Swelling: mild  Effusion: no effusion present    Comments:  Patellofemoral crepitation      Left Knee Exam     Tenderness   The patient is experiencing no tenderness  Range of Motion   Extension: normal   Flexion: 120     Tests   Varus: negative Valgus: negative    Other   Erythema: absent  Scars: present (Healed incision with no evidence of infection)  Sensation: normal  Pulse: present  Swelling: mild    Comments:  Thigh and calf soft, nontender  Negative Bobbins            I have personally reviewed pertinent films in PACS  X-rays left knee reveal a well-aligned prosthesis with no evidence of loosening      Large joint arthrocentesis: R knee  Date/Time: 10/15/2019 9:16 AM  Consent given by: patient  Site marked: site marked  Timeout: Immediately prior to procedure a time out was called to verify the correct patient, procedure, equipment, support staff and site/side marked as required   Supporting Documentation  Indications: pain   Procedure Details  Location: knee - R knee  Preparation: Patient was prepped and draped in the usual sterile fashion  Needle size: 22 G  Approach: anterolateral  Medications administered: 7 mL lidocaine 1 %; 6 mg betamethasone acetate-betamethasone sodium phosphate 6 (3-3) mg/mL    Patient tolerance: patient tolerated the procedure well with no immediate complications  Dressing:  Sterile dressing applied

## 2019-10-29 DIAGNOSIS — R07.9 CHEST PAIN: ICD-10-CM

## 2019-10-29 RX ORDER — FUROSEMIDE 20 MG/1
TABLET ORAL
Qty: 60 TABLET | Refills: 1 | Status: SHIPPED | OUTPATIENT
Start: 2019-10-29 | End: 2019-11-23 | Stop reason: SDUPTHER

## 2019-11-01 DIAGNOSIS — I50.32 CHRONIC DIASTOLIC CONGESTIVE HEART FAILURE (HCC): ICD-10-CM

## 2019-11-01 DIAGNOSIS — I48.0 PAROXYSMAL ATRIAL FIBRILLATION (HCC): ICD-10-CM

## 2019-11-01 DIAGNOSIS — I10 ESSENTIAL HYPERTENSION: ICD-10-CM

## 2019-11-01 DIAGNOSIS — I25.10 CORONARY ARTERY DISEASE INVOLVING NATIVE CORONARY ARTERY OF NATIVE HEART WITHOUT ANGINA PECTORIS: Primary | ICD-10-CM

## 2019-11-01 RX ORDER — METOPROLOL SUCCINATE 100 MG/1
100 TABLET, EXTENDED RELEASE ORAL DAILY
Qty: 90 TABLET | Refills: 1 | Status: SHIPPED | OUTPATIENT
Start: 2019-11-01 | End: 2020-04-22

## 2019-11-15 NOTE — PRE-PROCEDURE INSTRUCTIONS
Pre-Surgery Instructions:   Medication Instructions    ascorbic acid (VITAMIN C) 500 mg tablet Instructed patient per Anesthesia Guidelines   aspirin 81 MG tablet Patient was instructed to contact Physician for medication instruction   atorvastatin (LIPITOR) 40 mg tablet Instructed patient per Anesthesia Guidelines   furosemide (LASIX) 20 mg tablet Instructed patient per Anesthesia Guidelines   lisinopril (ZESTRIL) 20 mg tablet Instructed patient per Anesthesia Guidelines   metFORMIN (GLUCOPHAGE-XR) 500 mg 24 hr tablet Instructed patient per Anesthesia Guidelines   metoprolol succinate (TOPROL-XL) 100 mg 24 hr tablet Instructed patient per Anesthesia Guidelines   Multiple Vitamin (MULTI-VITAMIN) tablet Instructed patient per Anesthesia Guidelines   Multiple Vitamins-Minerals (ICAPS AREDS 2) CAPS Instructed patient per Anesthesia Guidelines   nitroglycerin (NITROSTAT) 0 3 mg SL tablet Instructed patient per Anesthesia Guidelines   omeprazole (PRILOSEC) 20 mg delayed release capsule Instructed patient per Anesthesia Guidelines  Follow prep as per physician  You will receive a call with your time to arrive at the hospital   Secure transportation, you will be having anesthesia

## 2019-11-20 ENCOUNTER — ANESTHESIA (OUTPATIENT)
Dept: GASTROENTEROLOGY | Facility: HOSPITAL | Age: 74
End: 2019-11-20

## 2019-11-20 ENCOUNTER — HOSPITAL ENCOUNTER (OUTPATIENT)
Dept: GASTROENTEROLOGY | Facility: HOSPITAL | Age: 74
Setting detail: OUTPATIENT SURGERY
Discharge: HOME | End: 2019-11-20
Attending: INTERNAL MEDICINE
Payer: COMMERCIAL

## 2019-11-20 ENCOUNTER — ANESTHESIA EVENT (OUTPATIENT)
Dept: GASTROENTEROLOGY | Facility: HOSPITAL | Age: 74
End: 2019-11-20

## 2019-11-20 VITALS
WEIGHT: 163 LBS | TEMPERATURE: 96 F | RESPIRATION RATE: 20 BRPM | BODY MASS INDEX: 22.82 KG/M2 | DIASTOLIC BLOOD PRESSURE: 62 MMHG | OXYGEN SATURATION: 100 % | SYSTOLIC BLOOD PRESSURE: 129 MMHG | HEIGHT: 71 IN | HEART RATE: 59 BPM

## 2019-11-20 DIAGNOSIS — K21.9 GASTROESOPHAGEAL REFLUX DISEASE, ESOPHAGITIS PRESENCE NOT SPECIFIED: ICD-10-CM

## 2019-11-20 DIAGNOSIS — Z85.038 PERSONAL HISTORY OF COLON CANCER: ICD-10-CM

## 2019-11-20 DIAGNOSIS — C18.0 MALIGNANT TUMOR OF CECUM (HCC): ICD-10-CM

## 2019-11-20 DIAGNOSIS — R13.10 DYSPHAGIA, UNSPECIFIED TYPE: ICD-10-CM

## 2019-11-20 LAB — GLUCOSE SERPL-MCNC: 89 MG/DL (ref 65–140)

## 2019-11-20 PROCEDURE — 43239 EGD BIOPSY SINGLE/MULTIPLE: CPT | Performed by: INTERNAL MEDICINE

## 2019-11-20 PROCEDURE — 45380 COLONOSCOPY AND BIOPSY: CPT | Performed by: INTERNAL MEDICINE

## 2019-11-20 PROCEDURE — 88305 TISSUE EXAM BY PATHOLOGIST: CPT | Performed by: PATHOLOGY

## 2019-11-20 PROCEDURE — 43450 DILATE ESOPHAGUS 1/MULT PASS: CPT | Performed by: INTERNAL MEDICINE

## 2019-11-20 PROCEDURE — 82948 REAGENT STRIP/BLOOD GLUCOSE: CPT

## 2019-11-20 RX ORDER — PROPOFOL 10 MG/ML
INJECTION, EMULSION INTRAVENOUS AS NEEDED
Status: DISCONTINUED | OUTPATIENT
Start: 2019-11-20 | End: 2019-11-20

## 2019-11-20 RX ORDER — PROPOFOL 10 MG/ML
INJECTION, EMULSION INTRAVENOUS AS NEEDED
Status: DISCONTINUED | OUTPATIENT
Start: 2019-11-20 | End: 2019-11-20 | Stop reason: SURG

## 2019-11-20 RX ORDER — SODIUM CHLORIDE 9 MG/ML
20 INJECTION, SOLUTION INTRAVENOUS CONTINUOUS
Status: DISCONTINUED | OUTPATIENT
Start: 2019-11-20 | End: 2019-11-24 | Stop reason: HOSPADM

## 2019-11-20 RX ADMIN — PROPOFOL 100 MG: 10 INJECTION, EMULSION INTRAVENOUS at 08:19

## 2019-11-20 RX ADMIN — SODIUM CHLORIDE: 0.9 INJECTION, SOLUTION INTRAVENOUS at 07:42

## 2019-11-20 RX ADMIN — PROPOFOL 100 MG: 10 INJECTION, EMULSION INTRAVENOUS at 07:50

## 2019-11-20 RX ADMIN — PROPOFOL 100 MG: 10 INJECTION, EMULSION INTRAVENOUS at 08:04

## 2019-11-20 NOTE — H&P
History and Physical - [facility]  Mulugeta Gandhi 68 y o  male MRN: [id number]    HPI: Mulugeta Gandhi is a 68y o  year old male who presents for EGD for dysphagia and colonoscopy for surveillance    REVIEW OF SYSTEMS: Per the HPI, and otherwise unremarkable      Historical Information   Past Medical History:   Diagnosis Date    Arthritis     Atrial fibrillation (HCC)     Cancer (HCC)     Lymphoma    Cataract     CHF (congestive heart failure) (HCC)     CHF (congestive heart failure) (HCC)     Colitis     Colon cancer (HCC)     Coronary artery disease     Diabetes mellitus type II, non insulin dependent (HCC)     Fatty liver     GERD (gastroesophageal reflux disease)     Hepatic steatosis     History of chemotherapy     History of radiation therapy     History of shingles 2018    Hyperlipidemia     Hypertension     [facility]     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 )     Pneumonia     Skin cancer      Past Surgical History:   Procedure Laterality Date    AORTIC VALVE REPLACEMENT      COLECTOMY      COLONOSCOPY      CORONARY ARTERY BYPASS GRAFT      DENTAL SURGERY      KNEE SURGERY      LYMPHADENECTOMY      OTHER SURGICAL HISTORY      MAZE PROCEDURE    AL TOTAL KNEE ARTHROPLASTY Left 1/28/2019    Procedure: ARTHROPLASTY LEFT KNEE TOTAL;  Surgeon: Satinder Maloney MD;  Location: [facility];  Service: Orthopedics    SKIN BIOPSY       Social History   Social History     Substance and Sexual Activity   Alcohol Use Yes    Frequency: Monthly or less    Drinks per session: 1 or 2    Binge frequency: Never    Comment: has not drank in over a month     Social History     Substance and Sexual Activity   Drug Use No     Social History     Tobacco Use   Smoking Status Former Smoker    Packs/day: 1 00    Years: 40 00    Pack years: 40 00    Types: Cigarettes    Last attempt to quit: 2009    Years since quitting: 10 8   Smokeless Tobacco Never Used     Family History   Problem Relation Age of Onset    Heart block Family     Coronary artery disease Mother     Thrombosis Mother     Hypertension Mother    Willena Rand Arthritis Mother     Hyperlipidemia Mother     Diabetes Father     Hypertension Father     Arthritis Father     Sudden death Father         cardiac    Colon cancer Paternal Grandfather     Breast cancer Sister     Heart disease Brother         Coronary stents       Meds/Allergies       Current Outpatient Medications:     ascorbic acid (VITAMIN C) 500 mg tablet    aspirin 81 MG tablet    atorvastatin (LIPITOR) 40 mg tablet    furosemide (LASIX) 20 mg tablet    lisinopril (ZESTRIL) 20 mg tablet    metFORMIN (GLUCOPHAGE-XR) 500 mg 24 hr tablet    metoprolol succinate (TOPROL-XL) 100 mg 24 hr tablet    Multiple Vitamin (MULTI-VITAMIN) tablet    Multiple Vitamins-Minerals (ICAPS AREDS 2) CAPS    Na Sulfate-K Sulfate-Mg Sulf (SUPREP BOWEL PREP KIT) 17 5-3 13-1 6 GM/177ML SOLN    nitroglycerin (NITROSTAT) 0 3 mg SL tablet    omeprazole (PRILOSEC) 20 mg delayed release capsule    Current Facility-Administered Medications:     sodium chloride 0 9 % infusion, 20 mL/hr, Intravenous, Continuous    Allergies   Allergen Reactions    Ceftin [Cefuroxime] Itching     However, has tolerated Cefazolin and Pip-Tazo since, which have different side chains  Be cautious with / avoid 2nd, 3rd, or 4th Gen Cephs that have similar side chains to Cefuroxime  Objective     /71   Pulse 65   Temp 98 °F (36 7 °C) (Oral)   Resp 18   Ht 5' 11" (1 803 m)   Wt 73 9 kg (163 lb)   SpO2 98%   BMI 22 73 kg/m²     PHYSICAL EXAM    Gen: NAD AAOx3  CV: S1S2 RRR no m/r/g  CHEST: Clear b/l no c/r/w  ABD: soft, +BS NT/ND  EXT: no edema    ASSESSMENT/PLAN:  This is a 68y o  year old male here for EGD and colonoscopy, and he is stable and optimized for his procedure

## 2019-11-20 NOTE — ANESTHESIA PREPROCEDURE EVALUATION
Review of Systems/Medical History  Patient summary reviewed  Chart reviewed      Cardiovascular  Hyperlipidemia, Hypertension controlled, Valve replacement aortic valve  replacement, CAD , History of CABG, Dysrhythmias , atrial fibrillation, Angina , CHF ,    Pulmonary  Pneumonia (2/19 resolved),        GI/Hepatic  Dysphagia,   GERD , Liver disease ,   Comment: Fatty liver          Endo/Other  Diabetes well controlled type 2 ,      GYN       Hematology  Negative hematology ROS      Musculoskeletal    Arthritis     Neurology  Negative neurology ROS      Psychology           Physical Exam    Airway    Mallampati score: I  TM Distance: >3 FB  Neck ROM: full     Dental   upper dentures and lower dentures,     Cardiovascular  Rhythm: regular, Rate: normal, Murmur, Cardiovascular exam normal    Pulmonary  Pulmonary exam normal Breath sounds clear to auscultation,     Other Findings        Anesthesia Plan  ASA Score- 3     Anesthesia Type- IV sedation with anesthesia with ASA Monitors  Additional Monitors:   Airway Plan:     Comment: Benefits and risks of planned anesthetic discussed with patient, and agrees to proceed  I, Dr Ekta Dorado, the attending anesthesiologist, have personally seen and evaluated the patient prior to anesthetic care  I have reviewed the preanesthetic record, and other medical records if appropriate to the anesthetic care  If a CRNA is involved in the case, I have reviewed the CRNA assessment, if present, and agree  The patient is in a suitable condition to proceed with my formulated anesthetic plan        Plan Factors-    Induction- intravenous  Postoperative Plan-     Informed Consent- Anesthetic plan and risks discussed with patient  I personally reviewed this patient with the CRNA  Discussed and agreed on the Anesthesia Plan with the TRACIE Brown

## 2019-11-20 NOTE — ANESTHESIA POSTPROCEDURE EVALUATION
Post-Op Assessment Note    CV Status:  Stable  Pain Score: 0    Pain management: adequate     Mental Status:  Sleepy   Hydration Status:  Stable   PONV Controlled:  None   Airway Patency:  Patent   Post Op Vitals Reviewed: Yes      Staff: Anesthesiologist, with CRNAs           BP      Temp      Pulse     Resp      SpO2

## 2019-11-23 DIAGNOSIS — R07.9 CHEST PAIN: ICD-10-CM

## 2019-11-24 RX ORDER — FUROSEMIDE 20 MG/1
TABLET ORAL
Qty: 60 TABLET | Refills: 1 | Status: SHIPPED | OUTPATIENT
Start: 2019-11-24 | End: 2019-12-21 | Stop reason: SDUPTHER

## 2019-12-16 ENCOUNTER — OFFICE VISIT (OUTPATIENT)
Dept: FAMILY MEDICINE CLINIC | Facility: HOSPITAL | Age: 74
End: 2019-12-16
Payer: COMMERCIAL

## 2019-12-16 VITALS
HEIGHT: 71 IN | TEMPERATURE: 97.2 F | DIASTOLIC BLOOD PRESSURE: 68 MMHG | WEIGHT: 167 LBS | SYSTOLIC BLOOD PRESSURE: 120 MMHG | BODY MASS INDEX: 23.38 KG/M2 | HEART RATE: 72 BPM

## 2019-12-16 DIAGNOSIS — R13.10 DYSPHAGIA, UNSPECIFIED TYPE: ICD-10-CM

## 2019-12-16 DIAGNOSIS — C18.0 MALIGNANT TUMOR OF CECUM (HCC): Primary | ICD-10-CM

## 2019-12-16 DIAGNOSIS — I10 ESSENTIAL HYPERTENSION: ICD-10-CM

## 2019-12-16 DIAGNOSIS — R09.89 DECREASED DORSALIS PEDIS PULSE: ICD-10-CM

## 2019-12-16 DIAGNOSIS — E11.9 TYPE 2 DIABETES MELLITUS WITHOUT COMPLICATION, WITHOUT LONG-TERM CURRENT USE OF INSULIN (HCC): ICD-10-CM

## 2019-12-16 PROCEDURE — 3074F SYST BP LT 130 MM HG: CPT | Performed by: INTERNAL MEDICINE

## 2019-12-16 PROCEDURE — 99214 OFFICE O/P EST MOD 30 MIN: CPT | Performed by: INTERNAL MEDICINE

## 2019-12-16 PROCEDURE — 3078F DIAST BP <80 MM HG: CPT | Performed by: INTERNAL MEDICINE

## 2019-12-16 NOTE — PROGRESS NOTES
Assessment/Plan:    Dysphagia  EGD done in Nov - Schatzki ring noted and dilation was done, pt notes no current symptoms,  He is on PPI daily, con't f/u as per GI, repeat EGD 3 yrs    Malignant tumor of cecum ([facility] )  Just had colonoscopy 11/19 - polyp removed and benign lymphoid aggregates noted, repeat 5 yrs    Type 2 diabetes mellitus without complication, without long-term current use of insulin ([facility] )    Lab Results   Component Value Date    HGBA1C 5 8 (H) 09/20/2019   DM type 2 without complications - controlled with a1C 5 8 - con't healthy diet and keep active, labs every 6 mos - order given, foot exam done today (dec DP pulse noted)  and pt states he has had an eye exam this year and got new glasses, he is on statin/ACE/ASA, re-eval after labs in March    Essential hypertension  BP at goal, con't current meds       Diagnoses and all orders for this visit:    Malignant tumor of cecum ([facility] )    Dysphagia, unspecified type    Type 2 diabetes mellitus without complication, without long-term current use of insulin ([facility])  -     Glucose, fasting; Future  -     Hemoglobin A1C; Future  -     Glucose, fasting  -     Hemoglobin A1C    Essential hypertension    Decreased dorsalis pedis pulse  Comments:  Dec L DP pulse - order for LEAD given, he is on ASA and statin already, he has no current symptoms - reviewed - call if they occur, will follow  Orders:  -     VAS lower limb arterial duplex, complete bilateral; Future          Subjective:      Patient ID: Brenda Tobin is a 69 y o  male  HPI Pt here for follow up appt    Pt had repeat EGD and colonoscopy in Nov 2019 for eval of dysphagia and f/u h/o colon cancer  One polyp was noted at transverse colon and small internal hemorrhoid was present  Pathology from polypectomy showed lymphoid aggregates  EGD noted Schatzki ring at the esophagus that was dilated   Bx was taken at [facility]- esophageal and squamous and gastric glandular mucosa with intestinal metaplasia was noted on bx  He was told to f/u in 3 yr with repeat EGD  He has f/u with GI 12/20/19  He notes no further swallowing or heartburn issues  He is taking Prilosec daily  Pts DM labs were done in Sept and his A1c was 5 8  He is taking his Metformin daily as directed  He checks his sugars "once in a while" and states "they are good" and usually 110-120's  Pt had a diabetic eye exam this year but no result in chart  He is due for diabetic foot exam   Pt denies numbness/tingling/burning/sores/ulcers  He notes no pain/fatigue in LE with ambulation  He is on a statin and ACE and ASA  BP at goal today and meds were reviewed and no changes have occurred  He denies missing doses of meds or SE with the meds  He does not check his BP outside the office  He notes no frequent Ha's/dizziness/double vision/CP  Review of Systems   Constitutional: Negative for chills and fever  HENT: Negative for congestion and sinus pain  Eyes: Negative for pain and visual disturbance  Respiratory: Negative for cough and shortness of breath  Cardiovascular: Negative for chest pain, palpitations and leg swelling  Gastrointestinal: Negative for abdominal pain, blood in stool, constipation, diarrhea, nausea and vomiting  Endocrine: Negative for polydipsia and polyuria  Genitourinary: Negative for difficulty urinating and dysuria  Musculoskeletal: Positive for arthralgias  Negative for myalgias  Skin: Negative for rash and wound  Neurological: Negative for dizziness and headaches  Hematological: Does not bruise/bleed easily  Psychiatric/Behavioral: Negative for behavioral problems and confusion  Objective:    /68 (BP Location: Right arm, Patient Position: Sitting, Cuff Size: Standard)   Pulse 72   Temp (!) 97 2 °F (36 2 °C)   Ht 5' 11" (1 803 m)   Wt 75 8 kg (167 lb)   BMI 23 29 kg/m²      Physical Exam   Constitutional: He appears well-developed and well-nourished  No distress  HENT:   Head: Normocephalic and atraumatic  Mouth/Throat: No oropharyngeal exudate  Eyes: Conjunctivae are normal  Right eye exhibits no discharge  Left eye exhibits no discharge  Neck: Neck supple  No tracheal deviation present  Cardiovascular: Normal rate, regular rhythm and normal heart sounds  Pulses are weak pulses  No murmur heard  Pulses:       Dorsalis pedis pulses are 2+ on the right side, and 0 on the left side  Pulmonary/Chest: Effort normal and breath sounds normal  No respiratory distress  He has no wheezes  He has no rales  Abdominal: Soft  He exhibits no distension  There is no tenderness  There is no rebound and no guarding  Musculoskeletal: He exhibits no deformity  Feet:   Right Foot:   Skin Integrity: Positive for dry skin  Negative for ulcer, skin breakdown, erythema, warmth or callus  Left Foot:   Skin Integrity: Positive for dry skin  Negative for ulcer, skin breakdown, erythema, warmth or callus  Neurological: He is alert  He exhibits normal muscle tone  Skin: Skin is warm and dry  No rash noted  Psychiatric: He has a normal mood and affect  His behavior is normal    Nursing note and vitals reviewed  Patient's shoes and socks removed  Right Foot/Ankle   Right Foot Inspection  Skin Exam: skin normal, skin intact and dry skin no warmth, no callus, no erythema, no maceration, no abnormal color, no pre-ulcer, no ulcer and no callus                          Toe Exam: ROM and strength within normal limits  Sensory   Vibration: intact    Monofilament testing: intact  Vascular    The right DP pulse is 2+  Left Foot/Ankle  Left Foot Inspection  Skin Exam: skin normal, skin intact and dry skinno warmth, no erythema, no maceration, normal color, no pre-ulcer, no ulcer and no callus                         Toe Exam: ROM and strength within normal limits                   Sensory   Vibration: intact    Monofilament: intact  Vascular    The left DP pulse is 0     Assign Risk Category:  No deformity present;  No loss of protective sensation; Weak pulses       Risk: 1

## 2019-12-16 NOTE — ASSESSMENT & PLAN NOTE
Lab Results   Component Value Date    HGBA1C 5 8 (H) 09/20/2019   DM type 2 without complications - controlled with a1C 5 8 - con't healthy diet and keep active, labs every 6 mos - order given, foot exam done today (dec DP pulse noted)  and pt states he has had an eye exam this year and got new glasses, he is on statin/ACE/ASA, re-eval after labs in March

## 2019-12-16 NOTE — ASSESSMENT & PLAN NOTE
EGD done in [facility] noted and dilation was done, pt notes no current symptoms,  He is on PPI daily, con't f/u as per GI, repeat EGD 3 yrs

## 2019-12-18 ENCOUNTER — VBI (OUTPATIENT)
Dept: FAMILY MEDICINE CLINIC | Facility: HOSPITAL | Age: 74
End: 2019-12-18

## 2019-12-18 NOTE — TELEPHONE ENCOUNTER
[address] [employer]   Phone Number: [phone]             -12/16/19 12:50 PM     Hello, our patient Benigno Gandhi has had Diabetic Eye Exam completed/performed   Please assist in updating the patient chart By [facility] and [address] [place] [address]-7/26/19  [facility] date of service 7/26/19  (the copy we got had a complete exam, but it didn't say a diabetic eye exam  Could you send them a form for them to sign that he had a complete DM exam?     Thank you,   Prisca Barnes MD      Faxed to [facility] [phone] Kita Powell 12/18/19 9:04 AM     Received Diabetic Eye Exam DOS 7/26/2019, resulted Kelvin milner MA 12/24/19 11:28 AM

## 2019-12-18 NOTE — LETTER
Diabetic Eye Exam Form    Date Requested: 19  Patient: Grady Knox  Patient : 1945   Referring Provider: Mello Lozano DO    Dilated Retinal Exam        Yes         No     Date of Diabetic Eye Exam ______________________________  Left Eye      Exam did show retinopathy    Exam did not show retinopathy         Mild       Moderate       None       Proliferative       Severe     Right Eye     Exam did show retinopathy    Exam did not show retinopathy         Mild       Moderate       None       Proliferative       Severe     Comments __________________________________________________________    Practice Providing Exam ______________________________________________    Exam Performed By (print name) _______________________________________      Provider Signature ___________________________________________________      These reports are needed for  compliance    Please fax this completed form and a copy of the Diabetic Eye Exam report to our office as soon as possible to [phone] attention Miriam: Phone [phone]    We thank you for your assistance in treating our mutual patient     (sent to Radha)

## 2019-12-20 ENCOUNTER — OFFICE VISIT (OUTPATIENT)
Dept: GASTROENTEROLOGY | Facility: CLINIC | Age: 74
End: 2019-12-20
Payer: COMMERCIAL

## 2019-12-20 VITALS
DIASTOLIC BLOOD PRESSURE: 78 MMHG | WEIGHT: 172 LBS | HEIGHT: 71 IN | BODY MASS INDEX: 24.08 KG/M2 | HEART RATE: 66 BPM | SYSTOLIC BLOOD PRESSURE: 124 MMHG

## 2019-12-20 DIAGNOSIS — K22.70 BARRETT'S ESOPHAGUS WITHOUT DYSPLASIA: ICD-10-CM

## 2019-12-20 DIAGNOSIS — C18.0 MALIGNANT TUMOR OF CECUM (HCC): ICD-10-CM

## 2019-12-20 DIAGNOSIS — Z85.038 PERSONAL HISTORY OF COLON CANCER: ICD-10-CM

## 2019-12-20 DIAGNOSIS — I50.32 CHRONIC DIASTOLIC CONGESTIVE HEART FAILURE (HCC): ICD-10-CM

## 2019-12-20 DIAGNOSIS — K22.2 SCHATZKI'S RING OF DISTAL ESOPHAGUS: ICD-10-CM

## 2019-12-20 DIAGNOSIS — R13.19 ESOPHAGEAL DYSPHAGIA: ICD-10-CM

## 2019-12-20 DIAGNOSIS — I48.20 CHRONIC ATRIAL FIBRILLATION (HCC): ICD-10-CM

## 2019-12-20 DIAGNOSIS — K21.00 GASTROESOPHAGEAL REFLUX DISEASE WITH ESOPHAGITIS: Primary | ICD-10-CM

## 2019-12-20 PROCEDURE — 99214 OFFICE O/P EST MOD 30 MIN: CPT | Performed by: NURSE PRACTITIONER

## 2019-12-20 RX ORDER — OMEPRAZOLE 40 MG/1
40 CAPSULE, DELAYED RELEASE ORAL DAILY
Qty: 30 CAPSULE | Refills: 12 | Status: SHIPPED | OUTPATIENT
Start: 2019-12-20 | End: 2020-12-18

## 2019-12-20 NOTE — PATIENT INSTRUCTIONS
Continue to minimize fatty/fried foods, chocolate, caffeine, alcohol, NSAIDs ( ibuprofen /Motrin / Advil, Aleve /naproxen, full-dose aspirin)  Consider elevating the head of your bed at night with blocks or mattress wedge  Please call our office if you have any further trouble swallowing

## 2019-12-20 NOTE — PROGRESS NOTES
[facility] - Outpatient Follow-up Note  Demaris Folds 69 y o  male MRN: [id number]  Encounter: [id number]    ASSESSMENT AND PLAN:      1  Gastroesophageal reflux disease with esophagitis  2  Roberts's esophagus without dysplasia  3  Esophageal dysphagia  4  Schatzki's ring of distal esophagus  EGD 11/20 showed salmon-colored mucosa at the Z-line with a Schatzki's ring, dilated to [address] [place]  Pathology +for Roberts's  Three year recall for EGD  Dysphagia resolved but continues with episodes of heartburn, or rare epigastric pain so will increase omeprazole to 40 mg daily  Reviewed lifestyle modifications, encouraged head of bed elevation at night  - omeprazole (PriLOSEC) 40 MG capsule; Take 1 capsule (40 mg total) by mouth daily  Dispense: 30 capsule; Refill: 12    5  Personal history of colon cancer  6  Malignant tumor of cecum ([facility] )  Colonoscopy 11/20/2019 showed grade 1 internal hemorrhoids, ileal colonic anastomosis in the ascending colon, mild sigmoid diverticulosis and 1 benign polyp  Five year recall-2024     7  Chronic diastolic congestive heart failure ([facility] )  8  Chronic atrial fibrillation  Previous history of CABG, AVR, MAZE, atrial fibrillation and CHF  Follows with Dr Anurag Hernandez, cardiology every 6 months  remains stable on his medications per Cardiology  He should continue to have endoscopic procedures performed in hospital setting due to his multiple cardiac comorbidities  Followup Appointment:  4 weeks  ______________________________________________________________________    Chief Complaint   Patient presents with    Follow up to colonoscopy and EGD     HPI:  36-year-old male patient returns to the office to follow up his dysphagia, GERD and review his endoscopic procedures  Reports that his swallowing is back to normal following EGD with dilation of the Schatzki's ring    He continues to experience esophageal burning, reflux symptoms about once per week   He cannot relate these to specific food triggers or meals, toxic habits to explain  Rare, monthly random dull epigastric pain which resolves spontaneously as well  Denies dysphagia, odynophagia, nausea or vomiting, anorexia, fatigue, chest pain  Moving his bowels daily without difficulty and denies melena, hematochezia, abdominal or rectal pain  Appetite is normal   Weight is stable      Historical Information   Past Medical History:   Diagnosis Date    Arthritis     Atrial fibrillation (HCC)     Cancer (HCC)     Lymphoma    Cataract     CHF (congestive heart failure) (HCC)     CHF (congestive heart failure) (HCC)     Colitis     Colon cancer ([place] [address] )     Coronary artery disease     Diabetes mellitus type II, non insulin dependent (HCC)     Fatty liver     GERD (gastroesophageal reflux disease)     Hepatic steatosis     History of chemotherapy     History of radiation therapy     History of shingles 2018    Hyperlipidemia     Hypertension     Lymphoma ([place] [address] )     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[address] )     Pneumonia     Skin cancer      Past Surgical History:   Procedure Laterality Date    AORTIC VALVE REPLACEMENT      COLECTOMY      COLONOSCOPY      CORONARY ARTERY BYPASS GRAFT      DENTAL SURGERY      KNEE SURGERY      LYMPHADENECTOMY      OTHER SURGICAL HISTORY      MAZE PROCEDURE    PA TOTAL KNEE ARTHROPLASTY Left 1/28/2019    Procedure: ARTHROPLASTY LEFT KNEE TOTAL;  Surgeon: Tony Bailey MD;  Location: [facility];  Service: Orthopedics    SKIN BIOPSY       Social History     Substance and Sexual Activity   Alcohol Use Yes    Frequency: Monthly or less    Drinks per session: 1 or 2    Binge frequency: Never    Comment: has not drank in over a month     Social History     Substance and Sexual Activity   Drug Use No     Social History     Tobacco Use   Smoking Status Former Smoker    Packs/day: 1 00    Years: 40 00    Pack years: 40 00    Types: Cigarettes    Last attempt to quit: 2009  Years since quitting: 10 9   Smokeless Tobacco Never Used     Family History   Problem Relation Age of Onset    Heart block Family     Coronary artery disease Mother     Thrombosis Mother     Hypertension Mother    Lamine Hoops Arthritis Mother     Hyperlipidemia Mother     Diabetes Father     Hypertension Father     Arthritis Father     Sudden death Father         cardiac    Colon cancer Paternal Grandfather     Breast cancer Sister     Heart disease Brother         Coronary stents         Current Outpatient Medications:     aspirin 81 MG tablet    atorvastatin (LIPITOR) 40 mg tablet    furosemide (LASIX) 20 mg tablet    lisinopril (ZESTRIL) 20 mg tablet    metFORMIN (GLUCOPHAGE-XR) 500 mg 24 hr tablet    metoprolol succinate (TOPROL-XL) 100 mg 24 hr tablet    Multiple Vitamin (MULTI-VITAMIN) tablet    Multiple Vitamins-Minerals (ICAPS AREDS 2) CAPS    nitroglycerin (NITROSTAT) 0 3 mg SL tablet    ascorbic acid (VITAMIN C) 500 mg tablet    omeprazole (PriLOSEC) 40 MG capsule  Allergies   Allergen Reactions    Ceftin [Cefuroxime] Itching     However, has tolerated Cefazolin and Pip-Tazo since, which have different side chains  Be cautious with / avoid 2nd, 3rd, or 4th Gen Cephs that have similar side chains to Cefuroxime  Reviewed medications and allergies and updated as indicated    ROS:  All systems were reviewed and positives noted as per HPI  All other systems were reported as negative  PHYSICAL EXAM:    Blood pressure 124/78, pulse 66, height 5' 11" (1 803 m), weight 78 kg (172 lb)  Body mass index is 23 99 kg/m²  General Appearance: NAD, cooperative, alert  Head:  Normocephalic, atraumatic  Eyes: Anicteric, PERRLA, EOMI  ENT:  Normal external appearance, normal mucosa  Neck:  Supple, symmetrical, trachea midline  Resp:  Clear to auscultation bilaterally; no rales, rhonchi or wheezing; respirations unlabored   CV:  S1 S2, Regular rate and rhythm; no murmur, rub, or gallop    GI: Soft, mild epigastric tenderness with palpation but otherwise nontender, non-distended; normal bowel sounds; no masses, no organomegaly   Rectal: Deferred  Musculoskeletal: No cyanosis, clubbing or edema  Normal ROM  Skin:  No jaundice, rashes, or lesions   Heme/Lymph: No palpable cervical lymphadenopathy  Psych: Normal affect, good eye contact  Neuro: No gross deficits, AAOx3    Lab Results:   Lab Results   Component Value Date    WBC 8 3 09/20/2019    HGB 12 9 (L) 09/20/2019    HCT 39 9 09/20/2019    MCV 81 09/20/2019     09/20/2019     Lab Results   Component Value Date     05/19/2017    K 3 8 09/20/2019    CL 97 09/20/2019    CO2 31 (H) 09/20/2019    ANIONGAP 7 02/22/2014    BUN 17 09/20/2019    CREATININE 1 00 09/20/2019    GLUCOSE 204 (H) 10/13/2017    GLUF 167 (H) 08/05/2016    CALCIUM 9 2 09/12/2019    AST 14 09/20/2019    ALT 9 09/20/2019    ALKPHOS 121 (H) 09/11/2019    PROT 6 5 05/19/2017    BILITOT 0 9 05/19/2017    EGFR 71 09/12/2019     No results found for: IRON, TIBC, FERRITIN  Lab Results   Component Value Date    LIPASE 173 11/25/2018       Radiology Results:   No results found

## 2019-12-21 DIAGNOSIS — R07.9 CHEST PAIN: ICD-10-CM

## 2019-12-22 RX ORDER — FUROSEMIDE 20 MG/1
TABLET ORAL
Qty: 60 TABLET | Refills: 1 | Status: SHIPPED | OUTPATIENT
Start: 2019-12-22 | End: 2020-01-19

## 2020-01-18 DIAGNOSIS — R07.9 CHEST PAIN: ICD-10-CM

## 2020-01-19 RX ORDER — FUROSEMIDE 20 MG/1
TABLET ORAL
Qty: 180 TABLET | Refills: 1 | Status: SHIPPED | OUTPATIENT
Start: 2020-01-19 | End: 2020-07-13

## 2020-03-01 DIAGNOSIS — I10 ESSENTIAL HYPERTENSION: ICD-10-CM

## 2020-03-02 RX ORDER — LISINOPRIL 20 MG/1
TABLET ORAL
Qty: 90 TABLET | Refills: 1 | Status: SHIPPED | OUTPATIENT
Start: 2020-03-02 | End: 2020-08-23

## 2020-03-05 NOTE — ASSESSMENT & PLAN NOTE
· BP better controlled this morning  · Continue home dose of lisinopril 20 mg p o  Daily and Toprol  mg daily  · Monitor BP per nursing unit  · Appreciate Cardiology recommendations  · Continue with metoprolol 5 mg IV p r n  SBP greater than 170  coffee

## 2020-03-12 ENCOUNTER — APPOINTMENT (RX ONLY)
Dept: URBAN - METROPOLITAN AREA CLINIC 26 | Facility: CLINIC | Age: 75
Setting detail: DERMATOLOGY
End: 2020-03-12

## 2020-03-12 VITALS — DIASTOLIC BLOOD PRESSURE: 80 MMHG | SYSTOLIC BLOOD PRESSURE: 127 MMHG | HEART RATE: 56 BPM

## 2020-03-12 PROBLEM — D03.4 MELANOMA IN SITU OF SCALP AND NECK: Status: ACTIVE | Noted: 2020-03-12

## 2020-03-12 PROCEDURE — ? MOHS SURGERY

## 2020-03-12 PROCEDURE — 17311 MOHS 1 STAGE H/N/HF/G: CPT

## 2020-03-12 PROCEDURE — 14301 TIS TRNFR ANY 30.1-60 SQ CM: CPT

## 2020-03-12 PROCEDURE — 88342 IMHCHEM/IMCYTCHM 1ST ANTB: CPT | Mod: 59

## 2020-03-12 NOTE — PROCEDURE: MOHS SURGERY
Body Location Override (Optional - Billing Will Still Be Based On Selected Body Map Location If Applicable): right post auricular neck

## 2020-03-12 NOTE — PROCEDURE: MIPS QUALITY
Quality 138: Melanoma: Coordination Of Care: A treatment plan was communicated to the physicians providing continuing care within one month of diagnosis outlining: diagnosis, tumor thickness and a plan for surgery or alternate care.
Detail Level: Detailed
Quality 137: Melanoma: Continuity Of Care - Recall System: Documentation of system reason(s) for not entering patient's information into a recall system (eg, melanoma being monitored by another physician provider)
Quality 431: Preventive Care And Screening: Unhealthy Alcohol Use - Screening: Patient screened for unhealthy alcohol use using a single question and scores 2 or greater episodes per year and brief intervention occurred

## 2020-03-12 NOTE — PROCEDURE: MOHS SURGERY
Preliminary Beta strep group A r/o culture is PENDING and/or NEGATIVE at this time.   No changes in treatment per Strep culture protocol. Bcc Pigmented Histology Text: The dermis contains distinctive tumor cell masses of various shapes and sizes composed of cells with large oval or elongated nuclei with relatively little cytoplasm.  The nuclei are homogenous.  There is no pronounced variation in size or intensity of staining and no significant anaplastic appearance.  The cells at the outer aspect of the tumor masses show palisading of nuclei.  Tumor masses are surrounded by a connective tissue stroma and in some areas there is retraction artifact of the tumor nodule away from the surrounding stroma.  Pigmented tumor cells are noted.

## 2020-04-22 DIAGNOSIS — I48.0 PAROXYSMAL ATRIAL FIBRILLATION (HCC): ICD-10-CM

## 2020-04-22 DIAGNOSIS — I10 ESSENTIAL HYPERTENSION: ICD-10-CM

## 2020-04-22 DIAGNOSIS — I50.32 CHRONIC DIASTOLIC CONGESTIVE HEART FAILURE (HCC): ICD-10-CM

## 2020-04-22 DIAGNOSIS — I25.10 CORONARY ARTERY DISEASE INVOLVING NATIVE CORONARY ARTERY OF NATIVE HEART WITHOUT ANGINA PECTORIS: ICD-10-CM

## 2020-04-22 RX ORDER — METOPROLOL SUCCINATE 100 MG/1
TABLET, EXTENDED RELEASE ORAL
Qty: 90 TABLET | Refills: 1 | Status: SHIPPED | OUTPATIENT
Start: 2020-04-22 | End: 2020-10-09 | Stop reason: SDUPTHER

## 2020-06-24 DIAGNOSIS — E11.9 TYPE 2 DIABETES MELLITUS WITHOUT COMPLICATION, WITHOUT LONG-TERM CURRENT USE OF INSULIN (HCC): ICD-10-CM

## 2020-06-24 RX ORDER — METFORMIN HYDROCHLORIDE 500 MG/1
TABLET, EXTENDED RELEASE ORAL
Qty: 90 TABLET | Refills: 2 | Status: SHIPPED | OUTPATIENT
Start: 2020-06-24 | End: 2021-03-14

## 2020-06-26 LAB
EST. AVERAGE GLUCOSE BLD GHB EST-MCNC: 126 MG/DL
GLUCOSE SERPL-MCNC: 106 MG/DL (ref 65–99)
HBA1C MFR BLD: 6 % (ref 4.8–5.6)

## 2020-06-26 PROCEDURE — 3044F HG A1C LEVEL LT 7.0%: CPT | Performed by: INTERNAL MEDICINE

## 2020-07-13 DIAGNOSIS — R07.9 CHEST PAIN: ICD-10-CM

## 2020-07-13 RX ORDER — FUROSEMIDE 20 MG/1
TABLET ORAL
Qty: 180 TABLET | Refills: 1 | Status: SHIPPED | OUTPATIENT
Start: 2020-07-13 | End: 2021-01-03

## 2020-07-17 ENCOUNTER — OFFICE VISIT (OUTPATIENT)
Dept: CARDIOLOGY CLINIC | Facility: CLINIC | Age: 75
End: 2020-07-17
Payer: COMMERCIAL

## 2020-07-17 VITALS
WEIGHT: 175 LBS | BODY MASS INDEX: 24.5 KG/M2 | TEMPERATURE: 97.2 F | SYSTOLIC BLOOD PRESSURE: 150 MMHG | DIASTOLIC BLOOD PRESSURE: 70 MMHG | HEIGHT: 71 IN | HEART RATE: 60 BPM

## 2020-07-17 DIAGNOSIS — I25.10 CORONARY ARTERY DISEASE INVOLVING NATIVE CORONARY ARTERY OF NATIVE HEART WITHOUT ANGINA PECTORIS: Primary | ICD-10-CM

## 2020-07-17 PROCEDURE — 3077F SYST BP >= 140 MM HG: CPT | Performed by: INTERNAL MEDICINE

## 2020-07-17 PROCEDURE — 2022F DILAT RTA XM EVC RTNOPTHY: CPT | Performed by: INTERNAL MEDICINE

## 2020-07-17 PROCEDURE — 4040F PNEUMOC VAC/ADMIN/RCVD: CPT | Performed by: INTERNAL MEDICINE

## 2020-07-17 PROCEDURE — 1036F TOBACCO NON-USER: CPT | Performed by: INTERNAL MEDICINE

## 2020-07-17 PROCEDURE — 3078F DIAST BP <80 MM HG: CPT | Performed by: INTERNAL MEDICINE

## 2020-07-17 PROCEDURE — 99214 OFFICE O/P EST MOD 30 MIN: CPT | Performed by: INTERNAL MEDICINE

## 2020-07-17 PROCEDURE — 1160F RVW MEDS BY RX/DR IN RCRD: CPT | Performed by: INTERNAL MEDICINE

## 2020-07-17 PROCEDURE — 3008F BODY MASS INDEX DOCD: CPT | Performed by: INTERNAL MEDICINE

## 2020-07-17 RX ORDER — FERROUS SULFATE 324(65)MG
324 TABLET, DELAYED RELEASE (ENTERIC COATED) ORAL
Status: ON HOLD | COMMUNITY
Start: 2019-01-04 | End: 2021-05-20 | Stop reason: SDUPTHER

## 2020-07-17 NOTE — PROGRESS NOTES
Cardiology Follow Up    Mathew Cadet  1945  [id number]  [facility] [place]  [address] [id number] [phone]    1  Coronary artery disease involving native coronary artery of native heart without angina pectoris         Interval History:  Followup CAD    He is doing well  No chest pain or dyspnea  Doing well with medical therapy       Problem List     Basal cell carcinoma of skin    Coronary disease    Type 2 diabetes mellitus without complication, without long-term current use of insulin ([address] )      Lab Results   Component Value Date    HGBA1C 6 0 (H) 06/25/2020         Dyslipidemia    Elevation of level of transaminase or lactic acid dehydrogenase (LDH)    Erectile dysfunction    Esophageal reflux    Fatty liver    Malignant lymphoma ([address] )    Overview Signed 2/8/2018 11:17 AM by Nakul Fisher DO     Description: small cell lymphocytic lymphoma         Malignant tumor of cecum ([address] )    Multiple myeloma not having achieved remission ([address] )    Primary localized osteoarthritis of right knee    Paroxysmal atrial fibrillation (HCC)    Hx of CABG    Colitis    Essential hypertension    S/P total knee arthroplasty, left    Overview Signed 1/28/2019  4:19 PM by Drea Lockwood     -  S/P L TKA today  -  Orthopedic surgery attending           Aftercare following left knee joint replacement surgery    Chronic diastolic congestive heart failure ([address] )    Wt Readings from Last 3 Encounters:   07/17/20 79 4 kg (175 lb)   12/20/19 78 kg (172 lb)   12/16/19 75 8 kg (167 lb)                 History of aortic valve replacement with bioprosthetic valve (Chronic)    Leukocytosis    Dyspnea    Hyponatremia    Lactic acidosis    Atrial fibrillation (HCC)    Chest pressure    Unstable angina pectoris ([address] )    Dysphagia    Personal history of colon cancer    Roberts's esophagus without dysplasia    Schatzki's ring of distal esophagus        Past Medical History:   Diagnosis Date    Arthritis     Atrial fibrillation (HCC)     Cancer (HCC)     Lymphoma    Cataract     CHF (congestive heart failure) (HCC)     CHF (congestive heart failure) (HCC)     Colitis     Colon cancer ([place] [address] )     Coronary artery disease     Diabetes mellitus type II, non insulin dependent (HCC)     Fatty liver     GERD (gastroesophageal reflux disease)     Hepatic steatosis     History of chemotherapy     History of radiation therapy     History of shingles 2018    Hyperlipidemia     Hypertension     Lymphoma ([place] [address] )     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[address] )     Pneumonia     Skin cancer      Social History     Socioeconomic History    Marital status: /Civil Union     Spouse name: Not on file    Number of children: Not on file    Years of education: Not on file    Highest education level: Not on file   Occupational History    Occupation: WORKING FULLTIME    Social Needs    Financial resource strain: Not on file    Food insecurity:     Worry: Not on file     Inability: Not on file    Transportation needs:     Medical: Not on file     Non-medical: Not on file   Tobacco Use    Smoking status: Former Smoker     Packs/day: 1 00     Years: 40 00     Pack years: 40 00     Types: Cigarettes     Last attempt to quit: 2009     Years since quittin 5    Smokeless tobacco: Never Used   Substance and Sexual Activity    Alcohol use: Yes     Frequency: Monthly or less     Drinks per session: 1 or 2     Binge frequency: Never     Comment: has not drank in over a month    Drug use: No    Sexual activity: Not on file   Lifestyle    Physical activity:     Days per week: Not on file     Minutes per session: Not on file    Stress: Not on file   Relationships    Social connections:     Talks on phone: Not on file     Gets together: Not on file     Attends [employer] service: Not on file     Active member of club or organization: Not on file     Attends meetings of clubs or organizations: Not on file     Relationship status: Not on file    Intimate partner violence:     Fear of current or ex partner: Not on file     Emotionally abused: Not on file     Physically abused: Not on file     Forced sexual activity: Not on file   Other Topics Concern    Not on file   Social History Narrative    Not on file      Family History   Problem Relation Age of Onset    Heart block Family     Coronary artery disease Mother     Thrombosis Mother     Hypertension Mother    Arabella Nine Arthritis Mother     Hyperlipidemia Mother     Diabetes Father     Hypertension Father     Arthritis Father     Sudden death Father         cardiac    Colon cancer Paternal Grandfather     Breast cancer Sister     Heart disease Brother         Coronary stents     Past Surgical History:   Procedure Laterality Date    AORTIC VALVE REPLACEMENT      COLECTOMY      COLONOSCOPY      CORONARY ARTERY BYPASS GRAFT      DENTAL SURGERY      KNEE SURGERY      LYMPHADENECTOMY      OTHER SURGICAL HISTORY      MAZE PROCEDURE    AK TOTAL KNEE ARTHROPLASTY Left 1/28/2019    Procedure: ARTHROPLASTY LEFT KNEE TOTAL;  Surgeon: Chemo Alberts MD;  Location: [facility];  Service: Orthopedics    SKIN BIOPSY         Current Outpatient Medications:     aspirin 81 MG tablet, Take 1 tablet (81 mg total) by mouth daily, Disp: , Rfl:     atorvastatin (LIPITOR) 40 mg tablet, Take 1 tablet (40 mg total) by mouth daily, Disp: 90 tablet, Rfl: 3    ferrous sulfate 324 MG TBEC, Take 324 mg by mouth, Disp: , Rfl:     furosemide (LASIX) 20 mg tablet, TAKE 1 TABLET BY MOUTH TWICE A DAY, Disp: 180 tablet, Rfl: 1    lisinopril (ZESTRIL) 20 mg tablet, TAKE 1 TABLET BY MOUTH EVERY DAY, Disp: 90 tablet, Rfl: 1    metFORMIN (GLUCOPHAGE-XR) 500 mg 24 hr tablet, TAKE 1 TABLET BY MOUTH EVERY DAY WITH DINNER, Disp: 90 tablet, Rfl: 2    metoprolol succinate (TOPROL-XL) 100 mg 24 hr tablet, TAKE 1 TABLET BY MOUTH EVERY DAY, Disp: 90 tablet, Rfl: 1    Multiple Vitamin (MULTI-VITAMIN) tablet, Take 1 tablet by mouth daily, Disp: 30 tablet, Rfl: 1    omeprazole (PriLOSEC) 40 MG capsule, Take 1 capsule (40 mg total) by mouth daily, Disp: 30 capsule, Rfl: 12    ascorbic acid (VITAMIN C) 500 mg tablet, Take 1 tablet (500 mg total) by mouth 2 (two) times a day (Patient not taking: Reported on 12/20/2019), Disp: 60 tablet, Rfl: 1    Multiple Vitamins-Minerals (ICAPS AREDS 2) CAPS, Take 1 capsule by mouth daily, Disp: , Rfl:     nitroglycerin (NITROSTAT) 0 3 mg SL tablet, Nitrostat 0 3 mg sublingual tablet  Place 1 tablet as needed by sublingual route as needed for 90 days  , Disp: , Rfl:   Allergies   Allergen Reactions    Ceftin [Cefuroxime] Itching     However, has tolerated Cefazolin and Pip-Tazo since, which have different side chains  Be cautious with / avoid 2nd, 3rd, or 4th Gen Cephs that have similar side chains to Cefuroxime         Labs:     Chemistry        Component Value Date/Time     05/19/2017 0720    K 3 8 09/20/2019 0916    CL 97 09/20/2019 0916    CO2 31 (H) 09/20/2019 0916    BUN 17 09/20/2019 0916    CREATININE 1 00 09/20/2019 0916    CREATININE 1 05 09/12/2019 0509    CREATININE 0 93 05/19/2017 0720        Component Value Date/Time    CALCIUM 9 2 09/12/2019 0509    CALCIUM 9 3 05/19/2017 0720    ALKPHOS 121 (H) 09/11/2019 2032    ALKPHOS 97 05/19/2017 0720    AST 14 09/20/2019 0916    ALT 9 09/20/2019 0916    BILITOT 0 9 05/19/2017 0720            Lab Results   Component Value Date    CHOL 131 05/19/2017    CHOL 111 02/05/2016     Lab Results   Component Value Date    HDL 31 (L) 09/12/2019    HDL 22 (L) 02/15/2019    HDL 31 (L) 05/18/2018     Lab Results   Component Value Date    LDLCALC 44 09/12/2019    LDLCALC 44 02/15/2019    LDLCALC 58 05/18/2018     Lab Results   Component Value Date    TRIG 88 09/12/2019    TRIG 108 02/15/2019    TRIG 171 (H) 05/18/2018     Lab Results   Component Value Date    CHOLHDL 4 0 05/18/2018       Imaging: No results found  Review of Systems   Constitution: Negative  HENT: Negative  Eyes: Negative  Cardiovascular: Negative  Respiratory: Negative  Endocrine: Negative  Hematologic/Lymphatic: Negative  Skin: Negative  Musculoskeletal: Negative  Gastrointestinal: Negative  Genitourinary: Negative  Neurological: Negative  Psychiatric/Behavioral: Negative  Allergic/Immunologic: Negative  Vitals:    07/17/20 0819   BP: 150/70   Pulse: 60   Temp: (!) 97 2 °F (36 2 °C)           Physical Exam   Constitutional: He is oriented to person, place, and time  He appears well-developed  HENT:   Head: Normocephalic  Mouth/Throat: No oropharyngeal exudate  Eyes: Conjunctivae are normal  No scleral icterus  Neck: Normal range of motion  No JVD present  Cardiovascular: Normal rate, regular rhythm and normal heart sounds  Exam reveals no friction rub  No murmur heard  Pulmonary/Chest: Effort normal and breath sounds normal  No stridor  No respiratory distress  Abdominal: Soft  Bowel sounds are normal  He exhibits no distension  There is no tenderness  There is no guarding  Musculoskeletal: Normal range of motion  He exhibits no edema, tenderness or deformity  Neurological: He is alert and oriented to person, place, and time  Skin: Skin is warm and dry  He is not diaphoretic  Psychiatric: He has a normal mood and affect  His behavior is normal  Thought content normal        Discussion/Summary:    Coronary Artery Disease s/p CARLSON To LAD: He has no angina  Continue with medical therapy  Lipids stable       HTN: BP up today but at home has been ok  No changes       S/p BIO AVR: normal function on his echocardiogram       Hx of PAF in the past and s/p MAZE and TALIA ligation              The patient was counseled regarding diagnostic results, instructions for management, risk factor reductions, impressions   total time of encounter was 25 minutes and 15 minutes was spent counseling

## 2020-07-29 NOTE — PROGRESS NOTES
Subjective:     Anai De Los Santos is a 68 y o  male who presents to the office today for a preoperative medical consultation at the request of surgeon Dr Tobin Lara who plans on performing L knee total arthroplasty on January 28, 2019  He has appt with cardiology with an ECG next week  Prior anesthesia adverse reactions - had some hallucinations with second neck surgery - has had no other SE with other surgeries    Exercise capacity - Able to walk 4 blocks or climb 2 flights of stairs without symptoms - Yes    Easy bleeding/bruising - No    Chest pain/palpitation/edema - No    Dyspnea/wheezing/cough - No    Sleep apnea/COPD/asthma - No    Diabetes has greatly improved and A1C earlier this week was down to 7 0 - he has cut back on sugars/carbs  Still limited exercise d/t joint issues    He has BW and routine DM f/u in March HPI    Past Medical History:   Diagnosis Date    Atrial [facility] )     Fatty liver     Pneumonia          Past Surgical History:   Procedure Laterality Date    AORTIC VALVE REPLACEMENT      COLECTOMY      COLONOSCOPY      DENTAL SURGERY      KNEE SURGERY      LYMPHADENECTOMY      OTHER SURGICAL HISTORY      MAZE PROCEDURE         Family History   Problem Relation Age of Onset    Heart block Family     Coronary artery disease Mother     Thrombosis Mother     Diabetes Father     Hypertension Father     Colon cancer Paternal Grandfather          Social History   Substance Use Topics    Smoking status: Former Smoker     Packs/day: 1 00     Years: 40 00     Types: Cigarettes     Quit date: 2009    Smokeless tobacco: Never Used    Alcohol use Yes      Comment: 5-6 beers a week       Current Outpatient Prescriptions   Medication Sig Dispense Refill    ascorbic acid (VITAMIN C) 500 mg tablet Take 1 tablet (500 mg total) by mouth 2 (two) times a day 60 tablet 1    aspirin (HM ASPIRIN) 325 mg tablet Take 1 tablet by mouth daily      ferrous sulfate 324 (65 Fe) mg Take 1 tablet (324 mg total) by mouth 2 (two) times a day before meals 60 tablet 1    folic acid (FOLVITE) 1 mg tablet Take 1 tablet (1 mg total) by mouth daily 30 tablet 1    irbesartan (AVAPRO) 300 mg tablet Take 1 tablet by mouth daily      metFORMIN (GLUCOPHAGE-XR) 750 mg 24 hr tablet Take 1 tablet (750 mg total) by mouth daily with dinner for 30 days 90 tablet 2    metoprolol succinate (TOPROL-XL) 100 mg 24 hr tablet Take 1 tablet(s) every day by oral route for 90 days   Multiple Vitamin (MULTI-VITAMIN) tablet Take 1 tablet by mouth daily 30 tablet 1    Multiple Vitamins-Minerals (ICAPS PO) Take 1 capsule by mouth daily      omeprazole (PRILOSEC) 20 mg delayed release capsule Take 1 capsule by mouth Daily      pravastatin (PRAVACHOL) 40 mg tablet take 1 tablet by mouth once daily at bedtime 30 tablet 6    spironolactone (ALDACTONE) 25 mg tablet Take 1 tablet by mouth daily       No current facility-administered medications for this visit  Ceftin [cefuroxime]      Review of Systems  Review of Systems   Constitutional: Negative for appetite change, chills, fever and unexpected weight change  HENT: Positive for rhinorrhea  Negative for congestion and sinus pain  Eyes: Negative for pain and visual disturbance  Respiratory: Negative for cough, chest tightness and shortness of breath  Cardiovascular: Negative for chest pain, palpitations and leg swelling  Gastrointestinal: Negative for abdominal pain, constipation, diarrhea, nausea and vomiting  Genitourinary: Negative for difficulty urinating and dysuria  Musculoskeletal: Positive for arthralgias  Negative for myalgias  Skin: Negative for rash and wound  Neurological: Negative for dizziness and headaches  Hematological: Does not bruise/bleed easily  Psychiatric/Behavioral: Negative for behavioral problems and confusion         Objective:    /68   Pulse 83   Temp 98 °F (36 7 °C)   Ht 5' 11" (1 803 m)   Wt 81 6 kg (180 lb)   BMI 25 10 kg/m²     Physical Exam   Constitutional: He appears well-developed and well-nourished  No distress  HENT:   Head: Normocephalic and atraumatic  Right Ear: External ear normal    Left Ear: External ear normal    Mouth/Throat: Oropharynx is clear and moist    Eyes: Conjunctivae are normal  Right eye exhibits no discharge  Left eye exhibits no discharge  Neck: Neck supple  No tracheal deviation present  Cardiovascular: Normal rate, regular rhythm and normal heart sounds  No murmur heard  Neg carotid bruits B/L   Pulmonary/Chest: Effort normal and breath sounds normal  No respiratory distress  He has no wheezes  Abdominal: Soft  There is no tenderness  There is no guarding  Musculoskeletal: He exhibits no deformity  Neurological: He is alert  He exhibits normal muscle tone  Skin: Skin is warm and dry  No rash noted  Psychiatric: He has a normal mood and affect  His behavior is normal  Thought content normal    Nursing note and vitals reviewed        Cardiographics  ECG: will be done next week with cardio at cardiac clearance appt    Imaging  Chest x-ray: 12/21/18 - NAD    Lab Review   Recent labs: 1/8/19 - Bun/Cr up slightly but not much above baseline, sugar greatly improved and A1C 7 0, PTT mildly elevated at 35 but PT/CBC wnl    Assessment:    Patient Active Problem List   Diagnosis    Basal cell carcinoma of skin    Coronary disease    Type 2 diabetes mellitus without complication, without long-term current use of insulin (HCC)    Dyslipidemia    Elevation of level of transaminase or lactic acid dehydrogenase (LDH)    Erectile dysfunction    Esophageal reflux    Fatty liver    Malignant lymphoma (HCC)    Malignant tumor of cecum ([address] )    Multiple myeloma not having achieved remission ([facility] )    Primary localized osteoarthritis of right knee    Primary localized osteoarthritis of left knee    Atrial fibrillation ([address] )    Hx of CABG    Colitis    Essential hypertension Plan:     Surgical Clearance - Cleared    Risk - Pt low-intermediate risk for surgery BW/CXR reviewed - medically acceptable risk for surgery; for ECG and cardiac clearance next week with cardiology    Discussion/Summary:     (1) Preoperative consultation for medical clearance - as above pt is acceptable risk for surgery, keep appt with Cardio with ECG next week for cardiac clearance, hold all OTC NSAIDs and ASA 5 days prior to surgery, no Metformin the am of surgery, call with any changes in medical condition btw now and surgery    (2) OA L knee - for TKR as noted above     (3) DM type 2  w/o complications - borderline uncontrolled but greatly improved with A1C 7 0 - cont' watching diet and increase activity as able after TKR, do routine BW prior to regular DM f/u appt in March, UTD on foot and eye exam    (4) HTN - BP at goal, con't current meds, take BP meds am of surgery with tiny sip of water      Has to call GI to set up colonoscopy next month         Petey Guzman, DO 29-Jul-2020 01:31

## 2020-08-23 DIAGNOSIS — I10 ESSENTIAL HYPERTENSION: ICD-10-CM

## 2020-08-23 PROCEDURE — 4010F ACE/ARB THERAPY RXD/TAKEN: CPT | Performed by: INTERNAL MEDICINE

## 2020-08-23 RX ORDER — LISINOPRIL 20 MG/1
TABLET ORAL
Qty: 90 TABLET | Refills: 1 | Status: SHIPPED | OUTPATIENT
Start: 2020-08-23 | End: 2021-02-17

## 2020-09-02 DIAGNOSIS — I25.10 CORONARY ARTERY DISEASE INVOLVING NATIVE CORONARY ARTERY OF NATIVE HEART WITHOUT ANGINA PECTORIS: ICD-10-CM

## 2020-09-02 RX ORDER — ATORVASTATIN CALCIUM 40 MG/1
40 TABLET, FILM COATED ORAL DAILY
Qty: 90 TABLET | Refills: 3 | Status: SHIPPED | OUTPATIENT
Start: 2020-09-02 | End: 2021-08-18

## 2020-09-11 ENCOUNTER — OFFICE VISIT (OUTPATIENT)
Dept: FAMILY MEDICINE CLINIC | Facility: HOSPITAL | Age: 75
End: 2020-09-11
Payer: COMMERCIAL

## 2020-09-11 VITALS
HEART RATE: 76 BPM | SYSTOLIC BLOOD PRESSURE: 124 MMHG | WEIGHT: 170.8 LBS | TEMPERATURE: 98.9 F | DIASTOLIC BLOOD PRESSURE: 78 MMHG | BODY MASS INDEX: 23.91 KG/M2 | HEIGHT: 71 IN

## 2020-09-11 DIAGNOSIS — Z23 ENCOUNTER FOR IMMUNIZATION: ICD-10-CM

## 2020-09-11 DIAGNOSIS — C90.00 MULTIPLE MYELOMA NOT HAVING ACHIEVED REMISSION (HCC): ICD-10-CM

## 2020-09-11 DIAGNOSIS — I50.32 CHRONIC DIASTOLIC CONGESTIVE HEART FAILURE (HCC): ICD-10-CM

## 2020-09-11 DIAGNOSIS — Z00.00 MEDICARE ANNUAL WELLNESS VISIT, SUBSEQUENT: ICD-10-CM

## 2020-09-11 DIAGNOSIS — I48.20 CHRONIC ATRIAL FIBRILLATION (HCC): ICD-10-CM

## 2020-09-11 DIAGNOSIS — Z12.2 ENCOUNTER FOR SCREENING FOR LUNG CANCER: ICD-10-CM

## 2020-09-11 DIAGNOSIS — E11.9 TYPE 2 DIABETES MELLITUS WITHOUT COMPLICATION, WITHOUT LONG-TERM CURRENT USE OF INSULIN (HCC): ICD-10-CM

## 2020-09-11 DIAGNOSIS — K22.70 BARRETT'S ESOPHAGUS WITHOUT DYSPLASIA: Primary | ICD-10-CM

## 2020-09-11 DIAGNOSIS — I25.10 CORONARY ARTERY DISEASE INVOLVING NATIVE CORONARY ARTERY OF NATIVE HEART WITHOUT ANGINA PECTORIS: ICD-10-CM

## 2020-09-11 DIAGNOSIS — K22.2 SCHATZKI'S RING OF DISTAL ESOPHAGUS: ICD-10-CM

## 2020-09-11 DIAGNOSIS — I10 ESSENTIAL HYPERTENSION: ICD-10-CM

## 2020-09-11 PROBLEM — K52.9 COLITIS: Status: RESOLVED | Noted: 2018-11-25 | Resolved: 2020-09-11

## 2020-09-11 PROBLEM — E87.20 LACTIC ACIDOSIS: Status: RESOLVED | Noted: 2019-02-27 | Resolved: 2020-09-11

## 2020-09-11 PROBLEM — D72.829 LEUKOCYTOSIS: Status: RESOLVED | Noted: 2019-02-27 | Resolved: 2020-09-11

## 2020-09-11 PROBLEM — E87.2 LACTIC ACIDOSIS: Status: RESOLVED | Noted: 2019-02-27 | Resolved: 2020-09-11

## 2020-09-11 PROBLEM — I20.0 UNSTABLE ANGINA PECTORIS (HCC): Status: RESOLVED | Noted: 2019-09-12 | Resolved: 2020-09-11

## 2020-09-11 PROCEDURE — 99214 OFFICE O/P EST MOD 30 MIN: CPT | Performed by: INTERNAL MEDICINE

## 2020-09-11 PROCEDURE — 1101F PT FALLS ASSESS-DOCD LE1/YR: CPT | Performed by: INTERNAL MEDICINE

## 2020-09-11 PROCEDURE — G0008 ADMIN INFLUENZA VIRUS VAC: HCPCS

## 2020-09-11 PROCEDURE — 3725F SCREEN DEPRESSION PERFORMED: CPT | Performed by: INTERNAL MEDICINE

## 2020-09-11 PROCEDURE — G0439 PPPS, SUBSEQ VISIT: HCPCS | Performed by: INTERNAL MEDICINE

## 2020-09-11 PROCEDURE — 3288F FALL RISK ASSESSMENT DOCD: CPT | Performed by: INTERNAL MEDICINE

## 2020-09-11 PROCEDURE — 1170F FXNL STATUS ASSESSED: CPT | Performed by: INTERNAL MEDICINE

## 2020-09-11 PROCEDURE — 1125F AMNT PAIN NOTED PAIN PRSNT: CPT | Performed by: INTERNAL MEDICINE

## 2020-09-11 PROCEDURE — 90662 IIV NO PRSV INCREASED AG IM: CPT

## 2020-09-11 NOTE — ASSESSMENT & PLAN NOTE
Starting to note sensation of needed to be stretched again - has number for GI and calls prn - urged to call when ready, con't daily PPI, call with sudden new/worse symptoms

## 2020-09-11 NOTE — ASSESSMENT & PLAN NOTE
Currently no cardiac symptoms, on beta blocker/ACEe/statin/ASA and Lasix, call with any CV symptoms, con't f/u as per Cardio

## 2020-09-11 NOTE — ASSESSMENT & PLAN NOTE
Still following with Onc regularly with BW - con't f/u and tx and BW as per Onc, call with any new/worse symptoms

## 2020-09-11 NOTE — PROGRESS NOTES
Assessment and Plan:     Problem List Items Addressed This Visit        Digestive    Roberts's esophagus without dysplasia - Primary    Relevant Orders    CBC and differential    Comprehensive metabolic panel    Hemoglobin A1C    Lipid panel    Microalbumin / creatinine urine ratio    TSH, 3rd generation with Free T4 reflex    PSA Total (Reflex To Free)    Schatzki's ring of distal esophagus    Relevant Orders    CBC and differential    Comprehensive metabolic panel    Hemoglobin A1C    Lipid panel    Microalbumin / creatinine urine ratio    TSH, 3rd generation with Free T4 reflex    PSA Total (Reflex To Free)       Endocrine    Type 2 diabetes mellitus without complication, without long-term current use of insulin (HCC)    Relevant Orders    CBC and differential    Comprehensive metabolic panel    Hemoglobin A1C    Lipid panel    Microalbumin / creatinine urine ratio    TSH, 3rd generation with Free T4 reflex    PSA Total (Reflex To Free)       Cardiovascular and Mediastinum    Coronary disease    Relevant Orders    CBC and differential    Comprehensive metabolic panel    Hemoglobin A1C    Lipid panel    Microalbumin / creatinine urine ratio    TSH, 3rd generation with Free T4 reflex    PSA Total (Reflex To Free)    Essential hypertension    Relevant Orders    CBC and differential    Comprehensive metabolic panel    Hemoglobin A1C    Lipid panel    Microalbumin / creatinine urine ratio    TSH, 3rd generation with Free T4 reflex    PSA Total (Reflex To Free)      Other Visit Diagnoses     Medicare annual wellness visit, subsequent        Relevant Orders    CBC and differential    Comprehensive metabolic panel    Hemoglobin A1C    Lipid panel    Microalbumin / creatinine urine ratio    TSH, 3rd generation with Free T4 reflex    PSA Total (Reflex To Free)    Encounter for screening for lung cancer        Relevant Orders    CT lung screening program           Preventive health issues were discussed with patient, and age appropriate screening tests were ordered as noted in patient's After Visit Summary  Personalized health advice and appropriate referrals for health education or preventive services given if needed, as noted in patient's After Visit Summary       History of Present Illness:     Patient presents for Medicare Annual Wellness visit    Patient Care Team:  Carolyn Hook DO as PCP - General (Internal Medicine)  Carolyn Hook DO as PCP - MD Dejah Chase MD (Cardiology)  Kristie Turner as Nurse Practitioner (Gastroenterology)  Daphney Osborne MD (Hematology and Oncology)     Problem List:     Patient Active Problem List   Diagnosis    Basal cell carcinoma of skin    Coronary disease    Type 2 diabetes mellitus without complication, without long-term current use of insulin ([facility]    Dyslipidemia    Elevation of level of transaminase or lactic acid dehydrogenase (LDH)    Erectile dysfunction    Esophageal reflux    Fatty liver    Malignant lymphoma ([facility]    Malignant tumor of cecum ([facility]    Multiple myeloma not having achieved remission ([facility] )    Primary localized osteoarthritis of right knee    Paroxysmal atrial fibrillation ([facility]    Hx of CABG    Essential hypertension    S/P total knee arthroplasty, left    Aftercare following left knee joint replacement surgery    Chronic diastolic congestive heart failure ([address] )    History of aortic valve replacement with bioprosthetic valve    Leukocytosis    Dyspnea    Hyponatremia    Lactic acidosis    Atrial fibrillation ([address]    Chest pressure    Unstable angina pectoris ([address] )    Dysphagia    Personal history of colon cancer    Roberts's esophagus without dysplasia    Schatzki's ring of distal esophagus      Past Medical and Surgical History:     Past Medical History:   Diagnosis Date    Arthritis     Atrial fibrillation ([facility]     Cancer ([facility] )     Lymphoma    Cataract     CHF (congestive heart failure) ([facility] )     CHF (congestive heart failure) (HCC)     Colitis     Colon cancer ([place] [address] )     Coronary artery disease     Diabetes mellitus type II, non insulin dependent (HCC)     Fatty liver     GERD (gastroesophageal reflux disease)     Hepatic steatosis     History of chemotherapy     History of radiation therapy     History of shingles 2018    Hyperlipidemia     Hypertension     Lymphoma ([facility] )     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[address] )     Pneumonia     Skin cancer      Past Surgical History:   Procedure Laterality Date    AORTIC VALVE REPLACEMENT      COLECTOMY      COLONOSCOPY      CORONARY ARTERY BYPASS GRAFT      DENTAL SURGERY      KNEE SURGERY      LYMPHADENECTOMY      OTHER SURGICAL HISTORY      MAZE PROCEDURE    AL TOTAL KNEE ARTHROPLASTY Left 1/28/2019    Procedure: ARTHROPLASTY LEFT KNEE TOTAL;  Surgeon: Willian Ryan MD;  Location: [facility] OR;  Service: Orthopedics    SKIN BIOPSY        Family History:     Family History   Problem Relation Age of Onset    Heart block Family     Coronary artery disease Mother     Thrombosis Mother     Hypertension Mother    [facility] Arthritis Mother     Hyperlipidemia Mother     Diabetes Father     Hypertension Father     Arthritis Father     Sudden death Father         cardiac    Colon cancer Paternal Grandfather     Breast cancer Sister     Heart disease Brother         Coronary stents      Social History:     E-Cigarette/Vaping    E-Cigarette Use Never User      E-Cigarette/Vaping Substances    Nicotine No     Flavoring No      Social History     Socioeconomic History    Marital status: /Civil Union     Spouse name: None    Number of children: None    Years of education: None    Highest education level: None   Occupational History    Occupation: WORKING FULLTIME    Social Needs    Financial resource strain: None    Food insecurity     Worry: None     Inability: None    Transportation needs     Medical: None     Non-medical: None Tobacco Use    Smoking status: Former Smoker     Packs/day: 1 00     Years: 40 00     Pack years: 40 00     Types: Cigarettes     Last attempt to quit: 2009     Years since quittin 7    Smokeless tobacco: Never Used   Substance and Sexual Activity    Alcohol use: Yes     Frequency: 2-4 times a month     Drinks per session: 1 or 2     Binge frequency: Never     Comment: has not drank in over a month    Drug use: No    Sexual activity: None   Lifestyle    Physical activity     Days per week: None     Minutes per session: None    Stress: None   Relationships    Social connections     Talks on phone: None     Gets together: None     Attends Taoism service: None     Active member of club or organization: None     Attends meetings of clubs or organizations: None     Relationship status: None    Intimate partner violence     Fear of current or ex partner: None     Emotionally abused: None     Physically abused: None     Forced sexual activity: None   Other Topics Concern    None   Social History Narrative    None      Medications and Allergies:     Current Outpatient Medications   Medication Sig Dispense Refill    ascorbic acid (VITAMIN C) 500 mg tablet Take 1 tablet (500 mg total) by mouth 2 (two) times a day (Patient not taking: Reported on 2019) 60 tablet 1    aspirin 81 MG tablet Take 1 tablet (81 mg total) by mouth daily      atorvastatin (LIPITOR) 40 mg tablet Take 1 tablet (40 mg total) by mouth daily 90 tablet 3    ferrous sulfate 324 MG TBEC Take 324 mg by mouth      furosemide (LASIX) 20 mg tablet TAKE 1 TABLET BY MOUTH TWICE A  tablet 1    lisinopril (ZESTRIL) 20 mg tablet TAKE 1 TABLET BY MOUTH EVERY DAY 90 tablet 1    metFORMIN (GLUCOPHAGE-XR) 500 mg 24 hr tablet TAKE 1 TABLET BY MOUTH EVERY DAY WITH DINNER 90 tablet 2    metoprolol succinate (TOPROL-XL) 100 mg 24 hr tablet TAKE 1 TABLET BY MOUTH EVERY DAY 90 tablet 1    Multiple Vitamin (MULTI-VITAMIN) tablet Take 1 tablet by mouth daily 30 tablet 1    Multiple Vitamins-Minerals (ICAPS AREDS 2) CAPS Take 1 capsule by mouth daily      nitroglycerin (NITROSTAT) 0 3 mg SL tablet Nitrostat 0 3 mg sublingual tablet   Place 1 tablet as needed by sublingual route as needed for 90 days   omeprazole (PriLOSEC) 40 MG capsule Take 1 capsule (40 mg total) by mouth daily 30 capsule 12     No current facility-administered medications for this visit  Allergies   Allergen Reactions    Ceftin [Cefuroxime] Itching     However, has tolerated Cefazolin and Pip-Tazo since, which have different side chains  Be cautious with / avoid 2nd, 3rd, or 4th Gen Cephs that have similar side chains to Cefuroxime  Immunizations:     Immunization History   Administered Date(s) Administered    INFLUENZA 09/18/2012, 09/01/2013, 10/01/2015, 10/01/2015, 11/28/2016, 11/03/2017, 10/25/2018    Influenza Split High Dose Preservative Free IM 09/18/2012, 10/07/2014, 09/30/2015, 11/28/2016, 11/03/2017    Influenza TIV (IM) 11/01/2014    Influenza, high dose seasonal 0 7 mL 09/23/2019    Pneumococcal 01/01/2013    Pneumococcal Conjugate 13-Valent 02/15/2016, 02/10/2017    Pneumococcal Polysaccharide PPV23 10/01/2003, 09/18/2012    Tdap 06/10/2014    Varicella 01/01/2014    Zoster 07/27/2014      Health Maintenance:         Topic Date Due    Hepatitis C Screening  1945         Topic Date Due    Influenza Vaccine  07/01/2020      Medicare Health Risk Assessment:     /78   Pulse 76   Temp 98 9 °F (37 2 °C)   Ht 5' 11" (1 803 m)   Wt 77 5 kg (170 lb 12 8 oz)   BMI 23 82 kg/m²      Chang Lindsay is here for his Subsequent Wellness visit  Last Medicare Wellness visit information reviewed, patient interviewed and updates made to the record today  Health Risk Assessment:   Patient rates overall health as good  Patient feels that their physical health rating is slightly better  Eyesight was rated as slightly worse   Hearing was rated as same  Patient feels that their emotional and mental health rating is same  Pain experienced in the last 7 days has been none  Patient states that he has experienced no weight loss or gain in last 6 months  Physical health better as notes joint pains are better and he is getting around better  Eye sight worse - having cataract watched    Depression Screening:   PHQ-2 Score: 0      Fall Risk Screening: In the past year, patient has experienced: no history of falling in past year      Home Safety:  Patient does not have trouble with stairs inside or outside of their home  Patient has working smoke alarms and has no working carbon monoxide detector  Home safety hazards include: none  Nutrition:   Current diet is Limited junk food and Low Carb  Medications:   Patient is currently taking over-the-counter supplements  OTC medications include: see medication list  Patient is able to manage medications  Activities of Daily Living (ADLs)/Instrumental Activities of Daily Living (IADLs):   Walk and transfer into and out of bed and chair?: Yes  Dress and groom yourself?: Yes    Bathe or shower yourself?: Yes    Feed yourself? Yes  Do your laundry/housekeeping?: Yes  Manage your money, pay your bills and track your expenses?: Yes  Make your own meals?: Yes    Do your own shopping?: Yes    Previous Hospitalizations:   Any hospitalizations or ED visits within the last 12 months?: No      Advance Care Planning:   Living will: Yes    Durable POA for healthcare:  Yes    Advanced directive: Yes      Cognitive Screening:   Provider or family/friend/caregiver concerned regarding cognition?: No    PREVENTIVE SCREENINGS      Cardiovascular Screening:    General: Screening Current and Risks and Benefits Discussed      Diabetes Screening:     General: History Diabetes, Risks and Benefits Discussed and Screening Current      Colorectal Cancer Screening:     General: History Colorectal Cancer, Risks and Benefits Discussed and Screening Current      Prostate Cancer Screening:    General: Risks and Benefits Discussed    Due for: PSA      Osteoporosis Screening:    General: Risks and Benefits Discussed and Patient Declines      Abdominal Aortic Aneurysm (AAA) Screening:    Risk factors include: age between 73-67 yo and tobacco use        General: Risks and Benefits Discussed and Screening Current      Lung Cancer Screening:     General: Risks and Benefits Discussed    Due for: Low Dose CT (LDCT)      Hepatitis C Screening:    General: Risks and Benefits Discussed and Screening Current    Other Counseling Topics:   Car/seat belt/driving safety and regular weightbearing exercise         Javier Denis, DO

## 2020-09-11 NOTE — ASSESSMENT & PLAN NOTE
Lab Results   Component Value Date    HGBA1C 6 0 (H) 06/25/2020   DM type 2 without complications - controlled with A1C 6 0 - con't healthy diet and current Metformin, BW q 6 mo - due in Dec and order given, foot exam done today, eye exam done 7/19 - no retinopathy so good for 2 yrs, on ACE/statin/ASA

## 2020-09-11 NOTE — PATIENT INSTRUCTIONS
Medicare Preventive Visit Patient Instructions  Thank you for completing your Welcome to Medicare Visit or Medicare Annual Wellness Visit today  Your next wellness visit will be due in one year (9/11/2021)  The screening/preventive services that you may require over the next 5-10 years are detailed below  Some tests may not apply to you based off risk factors and/or age  Screening tests ordered at today's visit but not completed yet may show as past due  Also, please note that scanned in results may not display below  Preventive Screenings:  Service Recommendations Previous Testing/Comments   Colorectal Cancer Screening  · Colonoscopy    · Fecal Occult Blood Test (FOBT)/Fecal Immunochemical Test (FIT)  · Fecal DNA/Cologuard Test  · Flexible Sigmoidoscopy Age: 54-65 years old   Colonoscopy: every 10 years (May be performed more frequently if at higher risk)  OR  FOBT/FIT: every 1 year  OR  Cologuard: every 3 years  OR  Sigmoidoscopy: every 5 years  Screening may be recommended earlier than age 48 if at higher risk for colorectal cancer  Also, an individualized decision between you and your healthcare provider will decide whether screening between the ages of 74-80 would be appropriate   Colonoscopy: 11/20/2019  FOBT/FIT: Not on file  Cologuard: Not on file  Sigmoidoscopy: Not on file    History Colorectal Cancer     Prostate Cancer Screening Individualized decision between patient and health care provider in men between ages of 53-78   Medicare will cover every 12 months beginning on the day after your 50th birthday PSA: 0 9 ng/mL          Hepatitis C Screening Once for adults born between 1945 and 1965  More frequently in patients at high risk for Hepatitis C Hep C Antibody: Not on file       Diabetes Screening 1-2 times per year if you're at risk for diabetes or have pre-diabetes Fasting glucose: 167 mg/dL   A1C: 6 0 %    Screening Not Indicated  History Diabetes   Cholesterol Screening Once every 5 years if you don't have a lipid disorder  May order more often based on risk factors  Lipid panel: 09/12/2019    Screening Current      Other Preventive Screenings Covered by Medicare:  1  Abdominal Aortic Aneurysm (AAA) Screening: covered once if your at risk  You're considered to be at risk if you have a family history of AAA or a male between the age of 73-68 who smoking at least 100 cigarettes in your lifetime  2  Lung Cancer Screening: covers low dose CT scan once per year if you meet all of the following conditions: (1) Age 50-69; (2) No signs or symptoms of lung cancer; (3) Current smoker or have quit smoking within the last 15 years; (4) You have a tobacco smoking history of at least 30 pack years (packs per day x number of years you smoked); (5) You get a written order from a healthcare provider  3  Glaucoma Screening: covered annually if you're considered high risk: (1) You have diabetes OR (2) Family history of glaucoma OR (3)  aged 48 and older OR (3)  American aged 72 and older  3  Osteoporosis Screening: covered every 2 years if you meet one of the following conditions: (1) Have a vertebral abnormality; (2) On glucocorticoid therapy for more than 3 months; (3) Have primary hyperparathyroidism; (4) On osteoporosis medications and need to assess response to drug therapy  5  HIV Screening: covered annually if you're between the age of 12-76  Also covered annually if you are younger than 13 and older than 72 with risk factors for HIV infection  For pregnant patients, it is covered up to 3 times per pregnancy      Immunizations:  Immunization Recommendations   Influenza Vaccine Annual influenza vaccination during flu season is recommended for all persons aged >= 6 months who do not have contraindications   Pneumococcal Vaccine (Prevnar and Pneumovax)  * Prevnar = PCV13  * Pneumovax = PPSV23 Adults 25-60 years old: 1-3 doses may be recommended based on certain risk factors  Adults 72 years old: Prevnar (PCV13) vaccine recommended followed by Pneumovax (PPSV23) vaccine  If already received PPSV23 since turning 65, then PCV13 recommended at least one year after PPSV23 dose  Hepatitis B Vaccine 3 dose series if at intermediate or high risk (ex: diabetes, end stage renal disease, liver disease)   Tetanus (Td) Vaccine - COST NOT COVERED BY MEDICARE PART B Following completion of primary series, a booster dose should be given every 10 years to maintain immunity against tetanus  Td may also be given as tetanus wound prophylaxis  Tdap Vaccine - COST NOT COVERED BY MEDICARE PART B Recommended at least once for all adults  For pregnant patients, recommended with each pregnancy  Shingles Vaccine (Shingrix) - COST NOT COVERED BY MEDICARE PART B  2 shot series recommended in those aged 48 and above     Health Maintenance Due:      Topic Date Due    Hepatitis C Screening  1945     Immunizations Due:      Topic Date Due    Influenza Vaccine  07/01/2020     Advance Directives   What are advance directives? Advance directives are legal documents that state your wishes and plans for medical care  These plans are made ahead of time in case you lose your ability to make decisions for yourself  Advance directives can apply to any medical decision, such as the treatments you want, and if you want to donate organs  What are the types of advance directives? There are many types of advance directives, and each state has rules about how to use them  You may choose a combination of any of the following:  · Living will: This is a written record of the treatment you want  You can also choose which treatments you do not want, which to limit, and which to stop at a certain time  This includes surgery, medicine, IV fluid, and tube feedings  · Durable power of  for healthcare [facility]):   This is a written record that states who you want to make healthcare choices for you when you are unable to make them for yourself  This person, called a proxy, is usually a family member or a friend  You may choose more than 1 proxy  · Do not resuscitate (DNR) order:  A DNR order is used in case your heart stops beating or you stop breathing  It is a request not to have certain forms of treatment, such as CPR  A DNR order may be included in other types of advance directives  · Medical directive: This covers the care that you want if you are in a coma, near death, or unable to make decisions for yourself  You can list the treatments you want for each condition  Treatment may include pain medicine, surgery, blood transfusions, dialysis, IV or tube feedings, and a ventilator (breathing machine)  · Values history: This document has questions about your views, beliefs, and how you feel and think about life  This information can help others choose the care that you would choose  Why are advance directives important? An advance directive helps you control your care  Although spoken wishes may be used, it is better to have your wishes written down  Spoken wishes can be misunderstood, or not followed  Treatments may be given even if you do not want them  An advance directive may make it easier for your family to make difficult choices about your care  © Copyright [employer] 2018 Information is for End User's use only and may not be sold, redistributed or otherwise used for commercial purposes  All illustrations and images included in CareNotes® are the copyrighted property of [employer]  or [employer] [facility] Preventive Visit Patient Instructions  Thank you for completing your Welcome to Medicare Visit or Medicare Annual Wellness Visit today  Your next wellness visit will be due in one year (9/11/2021)  The screening/preventive services that you may require over the next 5-10 years are detailed below  Some tests may not apply to you based off risk factors and/or age   Screening tests ordered at today's visit but not completed yet may show as past due  Also, please note that scanned in results may not display below  Preventive Screenings:  Service Recommendations Previous Testing/Comments   Colorectal Cancer Screening  · Colonoscopy    · Fecal Occult Blood Test (FOBT)/Fecal Immunochemical Test (FIT)  · Fecal DNA/Cologuard Test  · Flexible Sigmoidoscopy Age: 54-65 years old   Colonoscopy: every 10 years (May be performed more frequently if at higher risk)  OR  FOBT/FIT: every 1 year  OR  Cologuard: every 3 years  OR  Sigmoidoscopy: every 5 years  Screening may be recommended earlier than age 48 if at higher risk for colorectal cancer  Also, an individualized decision between you and your healthcare provider will decide whether screening between the ages of 74-80 would be appropriate  Colonoscopy: 11/20/2019  FOBT/FIT: Not on file  Cologuard: Not on file  Sigmoidoscopy: Not on file    History Colorectal Cancer     Prostate Cancer Screening Individualized decision between patient and health care provider in men between ages of 53-78   Medicare will cover every 12 months beginning on the day after your 50th birthday PSA: 0 9 ng/mL          Hepatitis C Screening Once for adults born between 1945 and 1965  More frequently in patients at high risk for Hepatitis C Hep C Antibody: Not on file       Diabetes Screening 1-2 times per year if you're at risk for diabetes or have pre-diabetes Fasting glucose: 167 mg/dL   A1C: 6 0 %    Screening Not Indicated  History Diabetes   Cholesterol Screening Once every 5 years if you don't have a lipid disorder  May order more often based on risk factors  Lipid panel: 09/12/2019    Screening Current      Other Preventive Screenings Covered by Medicare:  6  Abdominal Aortic Aneurysm (AAA) Screening: covered once if your at risk  You're considered to be at risk if you have a family history of AAA or a male between the age of 73-68 who smoking at least 100 cigarettes in your lifetime    7  Lung Cancer Screening: covers low dose CT scan once per year if you meet all of the following conditions: (1) Age 50-69; (2) No signs or symptoms of lung cancer; (3) Current smoker or have quit smoking within the last 15 years; (4) You have a tobacco smoking history of at least 30 pack years (packs per day x number of years you smoked); (5) You get a written order from a healthcare provider  8  Glaucoma Screening: covered annually if you're considered high risk: (1) You have diabetes OR (2) Family history of glaucoma OR (3)  aged 48 and older OR (3)  American aged 72 and older  5  Osteoporosis Screening: covered every 2 years if you meet one of the following conditions: (1) Have a vertebral abnormality; (2) On glucocorticoid therapy for more than 3 months; (3) Have primary hyperparathyroidism; (4) On osteoporosis medications and need to assess response to drug therapy  10  HIV Screening: covered annually if you're between the age of 12-76  Also covered annually if you are younger than 13 and older than 72 with risk factors for HIV infection  For pregnant patients, it is covered up to 3 times per pregnancy  Immunizations:  Immunization Recommendations   Influenza Vaccine Annual influenza vaccination during flu season is recommended for all persons aged >= 6 months who do not have contraindications   Pneumococcal Vaccine (Prevnar and Pneumovax)  * Prevnar = PCV13  * Pneumovax = PPSV23 Adults 25-60 years old: 1-3 doses may be recommended based on certain risk factors  Adults 72 years old: Prevnar (PCV13) vaccine recommended followed by Pneumovax (PPSV23) vaccine  If already received PPSV23 since turning 65, then PCV13 recommended at least one year after PPSV23 dose     Hepatitis B Vaccine 3 dose series if at intermediate or high risk (ex: diabetes, end stage renal disease, liver disease)   Tetanus (Td) Vaccine - COST NOT COVERED BY MEDICARE PART B Following completion of primary series, a booster dose should be given every 10 years to maintain immunity against tetanus  Td may also be given as tetanus wound prophylaxis  Tdap Vaccine - COST NOT COVERED BY MEDICARE PART B Recommended at least once for all adults  For pregnant patients, recommended with each pregnancy  Shingles Vaccine (Shingrix) - COST NOT COVERED BY MEDICARE PART B  2 shot series recommended in those aged 48 and above     Health Maintenance Due:      Topic Date Due    Hepatitis C Screening  1945     Immunizations Due:      Topic Date Due    Influenza Vaccine  07/01/2020     Advance Directives   What are advance directives? Advance directives are legal documents that state your wishes and plans for medical care  These plans are made ahead of time in case you lose your ability to make decisions for yourself  Advance directives can apply to any medical decision, such as the treatments you want, and if you want to donate organs  What are the types of advance directives? There are many types of advance directives, and each state has rules about how to use them  You may choose a combination of any of the following:  · Living will: This is a written record of the treatment you want  You can also choose which treatments you do not want, which to limit, and which to stop at a certain time  This includes surgery, medicine, IV fluid, and tube feedings  · Durable power of  for healthcare [place] [facility]): This is a written record that states who you want to make healthcare choices for you when you are unable to make them for yourself  This person, called a proxy, is usually a family member or a friend  You may choose more than 1 proxy  · Do not resuscitate (DNR) order:  A DNR order is used in case your heart stops beating or you stop breathing  It is a request not to have certain forms of treatment, such as CPR  A DNR order may be included in other types of advance directives  · Medical directive:   This covers the care that you want if you are in a coma, near death, or unable to make decisions for yourself  You can list the treatments you want for each condition  Treatment may include pain medicine, surgery, blood transfusions, dialysis, IV or tube feedings, and a ventilator (breathing machine)  · Values history: This document has questions about your views, beliefs, and how you feel and think about life  This information can help others choose the care that you would choose  Why are advance directives important? An advance directive helps you control your care  Although spoken wishes may be used, it is better to have your wishes written down  Spoken wishes can be misunderstood, or not followed  Treatments may be given even if you do not want them  An advance directive may make it easier for your family to make difficult choices about your care  © Copyright [employer] 2018 Information is for End User's use only and may not be sold, redistributed or otherwise used for commercial purposes   All illustrations and images included in CareNotes® are the copyrighted property of [employer]  or [address] [place] [address]

## 2020-09-11 NOTE — ASSESSMENT & PLAN NOTE
Currently NSR and rate controlled on Metoprolol, on ASA for anticoagulation, call with any new/worse CV symptoms

## 2020-09-11 NOTE — ASSESSMENT & PLAN NOTE
Wt Readings from Last 3 Encounters:   09/11/20 77 5 kg (170 lb 12 8 oz)   07/17/20 79 4 kg (175 lb)   12/20/19 78 kg (172 lb)     Currently no s/sx of HF, on ASA/statin/lasix/Metoprolol/ACE, con't low sodium diet and call with new/worse symptoms/wgt gain/SOB

## 2020-09-11 NOTE — PROGRESS NOTES
Assessment/Plan:    Roberts's esophagus without dysplasia  EGD 11/19 + Roberts's, on PPI, q 3 yr EGD, con't f/u as per GI    Schatzki's ring of distal esophagus  Starting to note sensation of needed to be stretched again - has number for GI and calls prn - urged to call when ready, con't daily PPI, call with sudden new/worse symptoms    Atrial fibrillation (HCC)  Currently NSR and rate controlled on Metoprolol, on ASA for anticoagulation, call with any new/worse CV symptoms    Chronic diastolic congestive heart failure ([place] [address] )  Wt Readings from Last 3 Encounters:   09/11/20 77 5 kg (170 lb 12 8 oz)   07/17/20 79 4 kg (175 lb)   12/20/19 78 kg (172 lb)     Currently no s/sx of HF, on ASA/statin/lasix/Metoprolol/ACE, con't low sodium diet and call with new/worse symptoms/wgt gain/SOB        Coronary disease  Currently no cardiac symptoms, on beta blocker/ACEe/statin/ASA and Lasix, call with any CV symptoms, con't f/u as per Cardio    Essential hypertension  BP at goal, con't current meds    Multiple myeloma not having achieved remission ([facility] )  Still following with Onc regularly with BW - con't f/u and tx and BW as per Onc, call with any new/worse symptoms    Type 2 diabetes mellitus without complication, without long-term current use of insulin ([place] [address] )    Lab Results   Component Value Date    HGBA1C 6 0 (H) 06/25/2020   DM type 2 without complications - controlled with A1C 6 0 - con't healthy diet and current Metformin, BW q 6 mo - due in Dec and order given, foot exam done today, eye exam done 7/19 - no retinopathy so good for 2 yrs, on ACE/statin/ASA       Diagnoses and all orders for this visit:    Roberts's esophagus without dysplasia  -     CBC and differential; Future  -     Comprehensive metabolic panel; Future  -     Hemoglobin A1C; Future  -     Lipid panel; Future  -     Microalbumin / creatinine urine ratio; Future  -     TSH, 3rd generation with Free T4 reflex;  Future  -     PSA Total (Reflex To Free); Future  -     CBC and differential  -     Comprehensive metabolic panel  -     Hemoglobin A1C  -     Lipid panel  -     Microalbumin / creatinine urine ratio  -     TSH, 3rd generation with Free T4 reflex  -     PSA Total (Reflex To Free)    Schatzki's ring of distal esophagus  -     CBC and differential; Future  -     Comprehensive metabolic panel; Future  -     Hemoglobin A1C; Future  -     Lipid panel; Future  -     Microalbumin / creatinine urine ratio; Future  -     TSH, 3rd generation with Free T4 reflex; Future  -     PSA Total (Reflex To Free); Future  -     CBC and differential  -     Comprehensive metabolic panel  -     Hemoglobin A1C  -     Lipid panel  -     Microalbumin / creatinine urine ratio  -     TSH, 3rd generation with Free T4 reflex  -     PSA Total (Reflex To Free)    Coronary artery disease involving native coronary artery of native heart without angina pectoris  -     CBC and differential; Future  -     Comprehensive metabolic panel; Future  -     Hemoglobin A1C; Future  -     Lipid panel; Future  -     Microalbumin / creatinine urine ratio; Future  -     TSH, 3rd generation with Free T4 reflex; Future  -     PSA Total (Reflex To Free); Future  -     CBC and differential  -     Comprehensive metabolic panel  -     Hemoglobin A1C  -     Lipid panel  -     Microalbumin / creatinine urine ratio  -     TSH, 3rd generation with Free T4 reflex  -     PSA Total (Reflex To Free)    Chronic atrial fibrillation (HCC)    Chronic diastolic congestive heart failure (HCC)    Essential hypertension  -     CBC and differential; Future  -     Comprehensive metabolic panel; Future  -     Hemoglobin A1C; Future  -     Lipid panel; Future  -     Microalbumin / creatinine urine ratio; Future  -     TSH, 3rd generation with Free T4 reflex; Future  -     PSA Total (Reflex To Free);  Future  -     CBC and differential  -     Comprehensive metabolic panel  -     Hemoglobin A1C  -     Lipid panel  -     Microalbumin / creatinine urine ratio  -     TSH, 3rd generation with Free T4 reflex  -     PSA Total (Reflex To Free)    Type 2 diabetes mellitus without complication, without long-term current use of insulin ([facility])  -     CBC and differential; Future  -     Comprehensive metabolic panel; Future  -     Hemoglobin A1C; Future  -     Lipid panel; Future  -     Microalbumin / creatinine urine ratio; Future  -     TSH, 3rd generation with Free T4 reflex; Future  -     PSA Total (Reflex To Free); Future  -     CBC and differential  -     Comprehensive metabolic panel  -     Hemoglobin A1C  -     Lipid panel  -     Microalbumin / creatinine urine ratio  -     TSH, 3rd generation with Free T4 reflex  -     PSA Total (Reflex To Free)    Multiple myeloma not having achieved remission ([facility] )    Medicare annual wellness visit, subsequent  -     CBC and differential; Future  -     Comprehensive metabolic panel; Future  -     Hemoglobin A1C; Future  -     Lipid panel; Future  -     Microalbumin / creatinine urine ratio; Future  -     TSH, 3rd generation with Free T4 reflex; Future  -     PSA Total (Reflex To Free); Future  -     CBC and differential  -     Comprehensive metabolic panel  -     Hemoglobin A1C  -     Lipid panel  -     Microalbumin / creatinine urine ratio  -     TSH, 3rd generation with Free T4 reflex  -     PSA Total (Reflex To Free)    Encounter for screening for lung cancer  -     CT lung screening program; Future    Encounter for immunization  -     influenza vaccine, high-dose, PF 0 7 mL (FLUZONE HIGH-DOSE)      Colonoscopy 11/19 - 5 yrs    BW 6/20 (A1C 6 0)  DM foot exam 12/19  DM eye exam 7/19 - no retinopathy      Subjective:      Patient ID: Sarahy Villela is a 69 y o  male  HPI Pt here for follow up appt and AWV    Pt saw GI for follow up 12/19  His EGD from 11/19 was + for Roberts's  He is on Omeprazole 40 mg 1 tab PO q day  He was told to f/u with EGD in 3 yrs  He notes no SE with the medication   He notes no reflux symptoms  He was asked about problems swallowing and he states "its not as good as it used to "  He notes no pills/food/liquids getting stuck but "I can tell its starting to close up"  Pt saw Cardio for f/u CAD/CHF/A fib in July 2020  He had no meds changed and no studies ordered  He denies recent CP/palp/edema/SOB/orthopnea  He notes no SE with the medication  BP at goal today and meds were reviewed and no changes have occurred  He denies missing doses of meds or SE with the meds  He does not check his BP outside the office  He notes no frequent Ha's/dizziness/double vision/CP  He states his sugars have been good - 110's to 150's at the highest   Denies any readings in the 200's or symptomatic low readings  He notes no numbness/tingling/pain/sores/ulcer with his feet  Con't to follow with Onc regularly 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  He has BW done regularly  He notes no current GI symptoms and denies night sweats/unintentional wgt loss/cough/SOB  Colonoscopy 11/19 - 5 yrs    BW 6/20 (A1C 6 0)  DM foot exam 12/19  DM eye exam 7/19 - no retinopathy      Review of Systems   Constitutional: Negative for chills, fatigue, fever and unexpected weight change  HENT: Negative for congestion, hearing loss and sinus pain  Eyes: Positive for visual disturbance  Negative for pain  Respiratory: Negative for cough, shortness of breath and wheezing  Cardiovascular: Negative for chest pain, palpitations and leg swelling  Gastrointestinal: Negative for abdominal pain, blood in stool, constipation, diarrhea, nausea and vomiting  Endocrine: Negative for polydipsia and polyuria  Genitourinary: Negative for difficulty urinating, dysuria and hematuria  Musculoskeletal: Positive for arthralgias  Negative for myalgias  Skin: Negative for rash and wound  Neurological: Negative for dizziness, numbness and headaches  Hematological: Does not bruise/bleed easily  Psychiatric/Behavioral: Negative for behavioral problems, confusion and dysphoric mood  Objective:    /78   Pulse 76   Temp 98 9 °F (37 2 °C)   Ht 5' 11" (1 803 m)   Wt 77 5 kg (170 lb 12 8 oz)   BMI 23 82 kg/m²      Physical Exam  Vitals signs and nursing note reviewed  Constitutional:       General: He is not in acute distress  Appearance: Normal appearance  He is well-developed  HENT:      Head: Normocephalic and atraumatic  Right Ear: Tympanic membrane normal       Left Ear: Tympanic membrane normal    Eyes:      General:         Right eye: No discharge  Left eye: No discharge  Conjunctiva/sclera: Conjunctivae normal    Neck:      Musculoskeletal: Neck supple  Vascular: No carotid bruit  Trachea: No tracheal deviation  Cardiovascular:      Rate and Rhythm: Normal rate and regular rhythm  Pulses: no weak pulses          Dorsalis pedis pulses are 2+ on the right side and 2+ on the left side  Heart sounds: No murmur  Pulmonary:      Effort: Pulmonary effort is normal  No respiratory distress  Breath sounds: Normal breath sounds  No wheezing, rhonchi or rales  Abdominal:      General: There is no distension  Palpations: Abdomen is soft  Tenderness: There is no abdominal tenderness  There is no guarding or rebound  Musculoskeletal:         General: No deformity  Right lower leg: No edema  Left lower leg: No edema  Feet:      Right foot:      Skin integrity: Dry skin present  No ulcer, skin breakdown, erythema, warmth or callus  Left foot:      Skin integrity: Dry skin present  No ulcer, skin breakdown, erythema, warmth or callus  Skin:     General: Skin is warm and dry  Coloration: Skin is not pale  Findings: No rash  Neurological:      General: No focal deficit present  Mental Status: He is alert  Motor: No abnormal muscle tone        Gait: Gait normal    Psychiatric:         Mood and Affect: Mood normal          Behavior: Behavior normal          Thought Content: Thought content normal          Judgment: Judgment normal        Patient's shoes and socks removed  Right Foot/Ankle   Right Foot Inspection  Skin Exam: skin normal, skin intact and dry skin no warmth, no callus, no erythema, no maceration, no abnormal color, no pre-ulcer, no ulcer and no callus                          Toe Exam: ROM and strength within normal limits  Sensory   Vibration: diminished and intact    Monofilament testing: intact  Vascular    The right DP pulse is 2+  Left Foot/Ankle  Left Foot Inspection  Skin Exam: skin normal, skin intact and dry skinno warmth, no erythema, no maceration, normal color, no pre-ulcer, no ulcer and no callus                         Toe Exam: ROM and strength within normal limits                   Sensory   Vibration: diminished    Monofilament: intact  Vascular    The left DP pulse is 2+  Assign Risk Category:  No deformity present; No loss of protective sensation;  No weak pulses       Risk: 0

## 2020-09-21 ENCOUNTER — TELEPHONE (OUTPATIENT)
Dept: OBGYN CLINIC | Facility: HOSPITAL | Age: 75
End: 2020-09-21

## 2020-09-21 DIAGNOSIS — M17.11 PRIMARY LOCALIZED OSTEOARTHRITIS OF RIGHT KNEE: Primary | ICD-10-CM

## 2020-09-21 NOTE — TELEPHONE ENCOUNTER
Patient sees Dr Ibrahima Mccarthy  Patient called asking, if its possible to start prior authorization to get gel shot on R knee?    # [phone]

## 2020-09-24 ENCOUNTER — HOSPITAL ENCOUNTER (OUTPATIENT)
Dept: CT IMAGING | Facility: HOSPITAL | Age: 75
Discharge: HOME | End: 2020-09-24
Payer: COMMERCIAL

## 2020-09-24 DIAGNOSIS — Z12.2 ENCOUNTER FOR SCREENING FOR LUNG CANCER: ICD-10-CM

## 2020-09-24 PROCEDURE — G0297 LDCT FOR LUNG CA SCREEN: HCPCS

## 2020-09-24 PROCEDURE — G1004 CDSM NDSC: HCPCS

## 2020-10-09 DIAGNOSIS — I10 ESSENTIAL HYPERTENSION: ICD-10-CM

## 2020-10-09 DIAGNOSIS — I48.0 PAROXYSMAL ATRIAL FIBRILLATION (HCC): ICD-10-CM

## 2020-10-09 DIAGNOSIS — I25.10 CORONARY ARTERY DISEASE INVOLVING NATIVE CORONARY ARTERY OF NATIVE HEART WITHOUT ANGINA PECTORIS: ICD-10-CM

## 2020-10-09 DIAGNOSIS — I50.32 CHRONIC DIASTOLIC CONGESTIVE HEART FAILURE (HCC): ICD-10-CM

## 2020-10-09 RX ORDER — METOPROLOL SUCCINATE 100 MG/1
100 TABLET, EXTENDED RELEASE ORAL DAILY
Qty: 90 TABLET | Refills: 1 | Status: SHIPPED | OUTPATIENT
Start: 2020-10-09 | End: 2021-04-02

## 2020-10-21 ENCOUNTER — APPOINTMENT (OUTPATIENT)
Dept: RADIOLOGY | Facility: CLINIC | Age: 75
End: 2020-10-21
Payer: COMMERCIAL

## 2020-10-21 ENCOUNTER — OFFICE VISIT (OUTPATIENT)
Dept: OBGYN CLINIC | Facility: CLINIC | Age: 75
End: 2020-10-21
Payer: COMMERCIAL

## 2020-10-21 VITALS
SYSTOLIC BLOOD PRESSURE: 130 MMHG | BODY MASS INDEX: 23.52 KG/M2 | TEMPERATURE: 98.4 F | DIASTOLIC BLOOD PRESSURE: 72 MMHG | WEIGHT: 168 LBS | HEIGHT: 71 IN

## 2020-10-21 DIAGNOSIS — Z96.652 AFTERCARE FOLLOWING LEFT KNEE JOINT REPLACEMENT SURGERY: Primary | ICD-10-CM

## 2020-10-21 DIAGNOSIS — Z96.652 AFTERCARE FOLLOWING LEFT KNEE JOINT REPLACEMENT SURGERY: ICD-10-CM

## 2020-10-21 DIAGNOSIS — M17.11 PRIMARY LOCALIZED OSTEOARTHRITIS OF RIGHT KNEE: ICD-10-CM

## 2020-10-21 DIAGNOSIS — Z47.1 AFTERCARE FOLLOWING LEFT KNEE JOINT REPLACEMENT SURGERY: Primary | ICD-10-CM

## 2020-10-21 DIAGNOSIS — Z47.1 AFTERCARE FOLLOWING LEFT KNEE JOINT REPLACEMENT SURGERY: ICD-10-CM

## 2020-10-21 PROCEDURE — 99213 OFFICE O/P EST LOW 20 MIN: CPT | Performed by: PHYSICIAN ASSISTANT

## 2020-10-21 PROCEDURE — 73562 X-RAY EXAM OF KNEE 3: CPT

## 2020-10-21 PROCEDURE — 20610 DRAIN/INJ JOINT/BURSA W/O US: CPT | Performed by: PHYSICIAN ASSISTANT

## 2020-10-21 PROCEDURE — 3078F DIAST BP <80 MM HG: CPT | Performed by: PHYSICIAN ASSISTANT

## 2020-10-21 PROCEDURE — 1036F TOBACCO NON-USER: CPT | Performed by: PHYSICIAN ASSISTANT

## 2020-10-21 PROCEDURE — 1160F RVW MEDS BY RX/DR IN RCRD: CPT | Performed by: PHYSICIAN ASSISTANT

## 2020-10-28 ENCOUNTER — OFFICE VISIT (OUTPATIENT)
Dept: OBGYN CLINIC | Facility: CLINIC | Age: 75
End: 2020-10-28
Payer: COMMERCIAL

## 2020-10-28 VITALS
HEIGHT: 71 IN | WEIGHT: 168 LBS | TEMPERATURE: 97.7 F | BODY MASS INDEX: 23.52 KG/M2 | SYSTOLIC BLOOD PRESSURE: 118 MMHG | DIASTOLIC BLOOD PRESSURE: 70 MMHG

## 2020-10-28 DIAGNOSIS — M17.11 PRIMARY LOCALIZED OSTEOARTHRITIS OF RIGHT KNEE: Primary | ICD-10-CM

## 2020-10-28 PROCEDURE — 20610 DRAIN/INJ JOINT/BURSA W/O US: CPT | Performed by: PHYSICIAN ASSISTANT

## 2020-11-04 ENCOUNTER — APPOINTMENT (OUTPATIENT)
Dept: RADIOLOGY | Facility: CLINIC | Age: 75
End: 2020-11-04
Payer: COMMERCIAL

## 2020-11-04 ENCOUNTER — OFFICE VISIT (OUTPATIENT)
Dept: OBGYN CLINIC | Facility: CLINIC | Age: 75
End: 2020-11-04
Payer: COMMERCIAL

## 2020-11-04 VITALS
WEIGHT: 168 LBS | BODY MASS INDEX: 23.52 KG/M2 | DIASTOLIC BLOOD PRESSURE: 70 MMHG | HEIGHT: 71 IN | TEMPERATURE: 98.2 F | SYSTOLIC BLOOD PRESSURE: 120 MMHG

## 2020-11-04 DIAGNOSIS — M25.551 RIGHT HIP PAIN: ICD-10-CM

## 2020-11-04 DIAGNOSIS — M70.61 GREATER TROCHANTERIC BURSITIS OF RIGHT HIP: Primary | ICD-10-CM

## 2020-11-04 DIAGNOSIS — M17.11 PRIMARY LOCALIZED OSTEOARTHRITIS OF RIGHT KNEE: ICD-10-CM

## 2020-11-04 PROCEDURE — 1036F TOBACCO NON-USER: CPT | Performed by: ORTHOPAEDIC SURGERY

## 2020-11-04 PROCEDURE — 73502 X-RAY EXAM HIP UNI 2-3 VIEWS: CPT

## 2020-11-04 PROCEDURE — 20610 DRAIN/INJ JOINT/BURSA W/O US: CPT | Performed by: PHYSICIAN ASSISTANT

## 2020-11-04 PROCEDURE — 3074F SYST BP LT 130 MM HG: CPT | Performed by: ORTHOPAEDIC SURGERY

## 2020-11-04 PROCEDURE — 3008F BODY MASS INDEX DOCD: CPT | Performed by: ORTHOPAEDIC SURGERY

## 2020-11-04 PROCEDURE — 99213 OFFICE O/P EST LOW 20 MIN: CPT | Performed by: ORTHOPAEDIC SURGERY

## 2020-11-04 PROCEDURE — 1160F RVW MEDS BY RX/DR IN RCRD: CPT | Performed by: ORTHOPAEDIC SURGERY

## 2020-11-04 PROCEDURE — 3078F DIAST BP <80 MM HG: CPT | Performed by: ORTHOPAEDIC SURGERY

## 2020-12-16 ENCOUNTER — OFFICE VISIT (OUTPATIENT)
Dept: GASTROENTEROLOGY | Facility: CLINIC | Age: 75
End: 2020-12-16
Payer: COMMERCIAL

## 2020-12-16 VITALS
WEIGHT: 174 LBS | HEIGHT: 71 IN | DIASTOLIC BLOOD PRESSURE: 84 MMHG | SYSTOLIC BLOOD PRESSURE: 130 MMHG | BODY MASS INDEX: 24.36 KG/M2

## 2020-12-16 DIAGNOSIS — K22.70 BARRETT'S ESOPHAGUS WITHOUT DYSPLASIA: ICD-10-CM

## 2020-12-16 DIAGNOSIS — Z85.038 PERSONAL HISTORY OF COLON CANCER: ICD-10-CM

## 2020-12-16 DIAGNOSIS — K22.2 SCHATZKI'S RING OF DISTAL ESOPHAGUS: ICD-10-CM

## 2020-12-16 DIAGNOSIS — R13.19 ESOPHAGEAL DYSPHAGIA: Primary | ICD-10-CM

## 2020-12-16 DIAGNOSIS — R19.4 CHANGE IN BOWEL HABIT: ICD-10-CM

## 2020-12-16 DIAGNOSIS — K21.9 GASTROESOPHAGEAL REFLUX DISEASE, UNSPECIFIED WHETHER ESOPHAGITIS PRESENT: ICD-10-CM

## 2020-12-16 PROCEDURE — 1036F TOBACCO NON-USER: CPT | Performed by: NURSE PRACTITIONER

## 2020-12-16 PROCEDURE — 3008F BODY MASS INDEX DOCD: CPT | Performed by: NURSE PRACTITIONER

## 2020-12-16 PROCEDURE — 1160F RVW MEDS BY RX/DR IN RCRD: CPT | Performed by: NURSE PRACTITIONER

## 2020-12-16 PROCEDURE — 3079F DIAST BP 80-89 MM HG: CPT | Performed by: NURSE PRACTITIONER

## 2020-12-16 PROCEDURE — 3075F SYST BP GE 130 - 139MM HG: CPT | Performed by: NURSE PRACTITIONER

## 2020-12-16 PROCEDURE — 99214 OFFICE O/P EST MOD 30 MIN: CPT | Performed by: NURSE PRACTITIONER

## 2020-12-18 DIAGNOSIS — R13.19 ESOPHAGEAL DYSPHAGIA: ICD-10-CM

## 2020-12-18 DIAGNOSIS — K22.2 SCHATZKI'S RING OF DISTAL ESOPHAGUS: ICD-10-CM

## 2020-12-18 DIAGNOSIS — K22.70 BARRETT'S ESOPHAGUS WITHOUT DYSPLASIA: ICD-10-CM

## 2020-12-18 DIAGNOSIS — K21.00 GASTROESOPHAGEAL REFLUX DISEASE WITH ESOPHAGITIS: ICD-10-CM

## 2020-12-18 RX ORDER — OMEPRAZOLE 40 MG/1
CAPSULE, DELAYED RELEASE ORAL
Qty: 90 CAPSULE | Refills: 4 | Status: SHIPPED | OUTPATIENT
Start: 2020-12-18 | End: 2021-01-20 | Stop reason: SDUPTHER

## 2020-12-30 ENCOUNTER — TELEPHONE (OUTPATIENT)
Dept: GASTROENTEROLOGY | Facility: CLINIC | Age: 75
End: 2020-12-30

## 2020-12-30 ENCOUNTER — TELEMEDICINE (OUTPATIENT)
Dept: GASTROENTEROLOGY | Facility: CLINIC | Age: 75
End: 2020-12-30

## 2020-12-30 VITALS — WEIGHT: 165 LBS | HEIGHT: 71 IN | BODY MASS INDEX: 23.1 KG/M2

## 2020-12-30 DIAGNOSIS — R19.4 CHANGE IN BOWEL HABIT: Primary | ICD-10-CM

## 2020-12-30 DIAGNOSIS — R13.19 ESOPHAGEAL DYSPHAGIA: ICD-10-CM

## 2020-12-30 PROCEDURE — 3008F BODY MASS INDEX DOCD: CPT | Performed by: NURSE PRACTITIONER

## 2020-12-30 RX ORDER — SODIUM, POTASSIUM,MAG SULFATES 17.5-3.13G
SOLUTION, RECONSTITUTED, ORAL ORAL
Qty: 2 BOTTLE | Refills: 0 | Status: SHIPPED | OUTPATIENT
Start: 2020-12-30 | End: 2021-01-06 | Stop reason: HOSPADM

## 2020-12-31 LAB
ALBUMIN SERPL-MCNC: 3.9 G/DL (ref 3.7–4.7)
ALBUMIN/CREAT UR: 34 MG/G CREAT (ref 0–29)
ALBUMIN/GLOB SERPL: 1.4 {RATIO} (ref 1.2–2.2)
ALP SERPL-CCNC: 100 IU/L (ref 39–117)
ALT SERPL-CCNC: 9 IU/L (ref 0–44)
AST SERPL-CCNC: 12 IU/L (ref 0–40)
BASOPHILS # BLD AUTO: 0.1 X10E3/UL (ref 0–0.2)
BASOPHILS NFR BLD AUTO: 1 %
BILIRUB SERPL-MCNC: 0.6 MG/DL (ref 0–1.2)
BUN SERPL-MCNC: 14 MG/DL (ref 8–27)
BUN/CREAT SERPL: 15 (ref 10–24)
CALCIUM SERPL-MCNC: 9.3 MG/DL (ref 8.6–10.2)
CHLORIDE SERPL-SCNC: 100 MMOL/L (ref 96–106)
CHOLEST SERPL-MCNC: 79 MG/DL (ref 100–199)
CHOLEST/HDLC SERPL: 3.3 RATIO (ref 0–5)
CO2 SERPL-SCNC: 27 MMOL/L (ref 20–29)
CREAT SERPL-MCNC: 0.95 MG/DL (ref 0.76–1.27)
CREAT UR-MCNC: 61.9 MG/DL
EOSINOPHIL # BLD AUTO: 0.3 X10E3/UL (ref 0–0.4)
EOSINOPHIL NFR BLD AUTO: 3 %
ERYTHROCYTE [DISTWIDTH] IN BLOOD BY AUTOMATED COUNT: 14.3 % (ref 11.6–15.4)
EST. AVERAGE GLUCOSE BLD GHB EST-MCNC: 120 MG/DL
GLOBULIN SER-MCNC: 2.7 G/DL (ref 1.5–4.5)
GLUCOSE SERPL-MCNC: 97 MG/DL (ref 65–99)
HBA1C MFR BLD: 5.8 % (ref 4.8–5.6)
HCT VFR BLD AUTO: 32.7 % (ref 37.5–51)
HDLC SERPL-MCNC: 24 MG/DL
HGB BLD-MCNC: 10.1 G/DL (ref 13–17.7)
IMM GRANULOCYTES # BLD: 0.1 X10E3/UL (ref 0–0.1)
IMM GRANULOCYTES NFR BLD: 1 %
LDLC SERPL CALC-MCNC: 37 MG/DL (ref 0–99)
LYMPHOCYTES # BLD AUTO: 1.5 X10E3/UL (ref 0.7–3.1)
LYMPHOCYTES NFR BLD AUTO: 12 %
MCH RBC QN AUTO: 24.6 PG (ref 26.6–33)
MCHC RBC AUTO-ENTMCNC: 30.9 G/DL (ref 31.5–35.7)
MCV RBC AUTO: 80 FL (ref 79–97)
MICROALBUMIN UR-MCNC: 21.2 UG/ML
MONOCYTES # BLD AUTO: 0.9 X10E3/UL (ref 0.1–0.9)
MONOCYTES NFR BLD AUTO: 7 %
NEUTROPHILS # BLD AUTO: 9.8 X10E3/UL (ref 1.4–7)
NEUTROPHILS NFR BLD AUTO: 76 %
PLATELET # BLD AUTO: 199 X10E3/UL (ref 150–450)
POTASSIUM SERPL-SCNC: 3.6 MMOL/L (ref 3.5–5.2)
PROT SERPL-MCNC: 6.6 G/DL (ref 6–8.5)
PSA SERPL-MCNC: 2.7 NG/ML (ref 0–4)
RBC # BLD AUTO: 4.11 X10E6/UL (ref 4.14–5.8)
SL AMB EGFR AFRICAN AMERICAN: 91 ML/MIN/1.73
SL AMB EGFR NON AFRICAN AMERICAN: 79 ML/MIN/1.73
SL AMB REFLEX CRITERIA: NORMAL
SL AMB VLDL CHOLESTEROL CALC: 18 MG/DL (ref 5–40)
SODIUM SERPL-SCNC: 142 MMOL/L (ref 134–144)
TRIGL SERPL-MCNC: 88 MG/DL (ref 0–149)
TSH SERPL DL<=0.005 MIU/L-ACNC: 3.93 UIU/ML (ref 0.45–4.5)
WBC # BLD AUTO: 12.7 X10E3/UL (ref 3.4–10.8)

## 2020-12-31 PROCEDURE — 3061F NEG MICROALBUMINURIA REV: CPT | Performed by: NURSE PRACTITIONER

## 2020-12-31 PROCEDURE — 3044F HG A1C LEVEL LT 7.0%: CPT | Performed by: NURSE PRACTITIONER

## 2021-01-03 DIAGNOSIS — R07.9 CHEST PAIN: ICD-10-CM

## 2021-01-03 RX ORDER — FUROSEMIDE 20 MG/1
TABLET ORAL
Qty: 180 TABLET | Refills: 1 | Status: SHIPPED | OUTPATIENT
Start: 2021-01-03 | End: 2021-06-27

## 2021-01-06 ENCOUNTER — ANESTHESIA (OUTPATIENT)
Dept: GASTROENTEROLOGY | Facility: AMBULATORY SURGERY CENTER | Age: 76
End: 2021-01-06

## 2021-01-06 ENCOUNTER — HOSPITAL ENCOUNTER (OUTPATIENT)
Dept: GASTROENTEROLOGY | Facility: AMBULATORY SURGERY CENTER | Age: 76
Discharge: HOME | End: 2021-01-06
Payer: COMMERCIAL

## 2021-01-06 ENCOUNTER — ANESTHESIA EVENT (OUTPATIENT)
Dept: GASTROENTEROLOGY | Facility: AMBULATORY SURGERY CENTER | Age: 76
End: 2021-01-06

## 2021-01-06 VITALS
SYSTOLIC BLOOD PRESSURE: 151 MMHG | OXYGEN SATURATION: 97 % | HEART RATE: 68 BPM | TEMPERATURE: 98.1 F | RESPIRATION RATE: 19 BRPM | DIASTOLIC BLOOD PRESSURE: 67 MMHG

## 2021-01-06 VITALS — HEART RATE: 64 BPM

## 2021-01-06 DIAGNOSIS — R19.4 CHANGE IN BOWEL HABIT: ICD-10-CM

## 2021-01-06 DIAGNOSIS — Z85.038 PERSONAL HISTORY OF COLON CANCER: ICD-10-CM

## 2021-01-06 DIAGNOSIS — R13.19 ESOPHAGEAL DYSPHAGIA: ICD-10-CM

## 2021-01-06 DIAGNOSIS — K22.2 SCHATZKI'S RING OF DISTAL ESOPHAGUS: ICD-10-CM

## 2021-01-06 DIAGNOSIS — K21.9 GASTROESOPHAGEAL REFLUX DISEASE, UNSPECIFIED WHETHER ESOPHAGITIS PRESENT: ICD-10-CM

## 2021-01-06 DIAGNOSIS — K22.70 BARRETT'S ESOPHAGUS WITHOUT DYSPLASIA: ICD-10-CM

## 2021-01-06 PROCEDURE — 45380 COLONOSCOPY AND BIOPSY: CPT | Performed by: INTERNAL MEDICINE

## 2021-01-06 PROCEDURE — 88305 TISSUE EXAM BY PATHOLOGIST: CPT | Performed by: PATHOLOGY

## 2021-01-06 PROCEDURE — 43239 EGD BIOPSY SINGLE/MULTIPLE: CPT | Performed by: INTERNAL MEDICINE

## 2021-01-06 PROCEDURE — 43450 DILATE ESOPHAGUS 1/MULT PASS: CPT | Performed by: INTERNAL MEDICINE

## 2021-01-06 RX ORDER — PROPOFOL 10 MG/ML
INJECTION, EMULSION INTRAVENOUS AS NEEDED
Status: DISCONTINUED | OUTPATIENT
Start: 2021-01-06 | End: 2021-01-06

## 2021-01-06 RX ORDER — SODIUM CHLORIDE 9 MG/ML
50 INJECTION, SOLUTION INTRAVENOUS CONTINUOUS
Status: DISCONTINUED | OUTPATIENT
Start: 2021-01-06 | End: 2021-01-10 | Stop reason: HOSPADM

## 2021-01-06 RX ADMIN — PROPOFOL 30 MG: 10 INJECTION, EMULSION INTRAVENOUS at 13:15

## 2021-01-06 RX ADMIN — PROPOFOL 30 MG: 10 INJECTION, EMULSION INTRAVENOUS at 13:24

## 2021-01-06 RX ADMIN — PROPOFOL 30 MG: 10 INJECTION, EMULSION INTRAVENOUS at 13:12

## 2021-01-06 RX ADMIN — SODIUM CHLORIDE 50 ML/HR: 9 INJECTION, SOLUTION INTRAVENOUS at 12:56

## 2021-01-06 RX ADMIN — PROPOFOL 30 MG: 10 INJECTION, EMULSION INTRAVENOUS at 13:08

## 2021-01-06 RX ADMIN — PROPOFOL 30 MG: 10 INJECTION, EMULSION INTRAVENOUS at 13:10

## 2021-01-06 RX ADMIN — PROPOFOL 30 MG: 10 INJECTION, EMULSION INTRAVENOUS at 13:06

## 2021-01-06 RX ADMIN — PROPOFOL 80 MG: 10 INJECTION, EMULSION INTRAVENOUS at 13:03

## 2021-01-06 RX ADMIN — PROPOFOL 30 MG: 10 INJECTION, EMULSION INTRAVENOUS at 13:18

## 2021-01-06 RX ADMIN — PROPOFOL 30 MG: 10 INJECTION, EMULSION INTRAVENOUS at 13:21

## 2021-01-06 NOTE — ANESTHESIA POSTPROCEDURE EVALUATION
Post-Op Assessment Note    CV Status:  Stable  Pain Score: 0    Pain management: adequate     Mental Status:  Alert and awake   Hydration Status:  Euvolemic   PONV Controlled:  Controlled   Airway Patency:  Patent      Post Op Vitals Reviewed: Yes      Staff: Anesthesiologist         No complications documented      /69 (01/06/21 1330)    Temp      Pulse 79 (01/06/21 1330)   Resp 17 (01/06/21 1330)    SpO2 99 % (01/06/21 1330)

## 2021-01-06 NOTE — ANESTHESIA PREPROCEDURE EVALUATION
Procedure:  COLONOSCOPY  EGD    Relevant Problems   CARDIO   (+) Atrial fibrillation (HCC)   (+) Chronic diastolic congestive heart failure (HCC)   (+) Coronary disease   (+) Essential hypertension   (+) History of aortic valve replacement with bioprosthetic valve   (+) Hx of CABG      ENDO   (+) Type 2 diabetes mellitus without complication, without long-term current use of insulin (HCC)      GI/HEPATIC   (+) Dysphagia   (+) Esophageal reflux   (+) Fatty liver   (+) Malignant tumor of cecum (HCC)      MUSCULOSKELETAL   (+) Primary localized osteoarthritis of right knee      NEURO/PSYCH   (+) Personal history of colon cancer        Physical Exam    Airway    Mallampati score: II  TM Distance: >3 FB  Neck ROM: full     Dental   No notable dental hx     Cardiovascular  Rhythm: regular, Rate: normal, Cardiovascular exam normal    Pulmonary  Pulmonary exam normal Breath sounds clear to auscultation,     Other Findings        Anesthesia Plan  ASA Score- 3     Anesthesia Type- IV sedation with anesthesia with ASA Monitors  Additional Monitors:   Airway Plan:           Plan Factors-    Chart reviewed  Patient summary reviewed  Induction- intravenous  Postoperative Plan-     Informed Consent- Anesthetic plan and risks discussed with patient

## 2021-01-06 NOTE — H&P
History and Physical -  [facility]  Yehuda Edwards 77 y o  male MRN: [id number]    HPI: Yehuda Edwards is a 77y o  year old male who presents for dysphagia and change in bowels    REVIEW OF SYSTEMS: Per the HPI, and otherwise unremarkable      Historical Information   Past Medical History:   Diagnosis Date    Arthritis     Atrial fibrillation ([facility] )     Cataract     Colitis     Colon cancer ([facility] )     Diabetes mellitus type II, non insulin dependent ([facility] )     Fatty liver     History of chemotherapy     History of radiation therapy     History of shingles 2018    Hypertension     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[facility] )     Pneumonia     Skin cancer      Past Surgical History:   Procedure Laterality Date    AORTIC VALVE REPLACEMENT      COLECTOMY      COLONOSCOPY      CORONARY ARTERY BYPASS GRAFT      DENTAL SURGERY      KNEE SURGERY      LYMPHADENECTOMY      OTHER SURGICAL HISTORY      MAZE PROCEDURE    DE TOTAL KNEE ARTHROPLASTY Left 2019    Procedure: ARTHROPLASTY LEFT KNEE TOTAL;  Surgeon: Mila Verduzco MD;  Location: [facility];  Service: Orthopedics    SKIN BIOPSY       Social History   Social History     Substance and Sexual Activity   Alcohol Use Yes    Frequency: 2-4 times a month    Drinks per session: 1 or 2    Binge frequency: Never    Comment: has not drank in over a month     Social History     Substance and Sexual Activity   Drug Use No     Social History     Tobacco Use   Smoking Status Former Smoker    Packs/day: 1 00    Years: 40 00    Pack years: 40 00    Types: Cigarettes    Quit date:     Years since quittin 0   Smokeless Tobacco Never Used     Family History   Problem Relation Age of Onset    Heart block Family     Coronary artery disease Mother     Thrombosis Mother     Hypertension Mother     Arthritis Mother     Hyperlipidemia Mother     Diabetes Father     Hypertension Father     Arthritis Father     Sudden death Father         cardiac  Colon cancer Paternal Grandfather     Breast cancer Sister     Heart disease Brother         Coronary stents       Meds/Allergies       Current Outpatient Medications:     aspirin 81 MG tablet    atorvastatin (LIPITOR) 40 mg tablet    furosemide (LASIX) 20 mg tablet    lisinopril (ZESTRIL) 20 mg tablet    metFORMIN (GLUCOPHAGE-XR) 500 mg 24 hr tablet    metoprolol succinate (TOPROL-XL) 100 mg 24 hr tablet    Multiple Vitamins-Minerals (ICAPS AREDS 2) CAPS    Na Sulfate-K Sulfate-Mg Sulf (Suprep Bowel Prep Kit) 17 5-3 13-1 6 GM/177ML SOLN    omeprazole (PriLOSEC) 40 MG capsule    ascorbic acid (VITAMIN C) 500 mg tablet    ferrous sulfate 324 MG TBEC    Multiple Vitamin (MULTI-VITAMIN) tablet    nitroglycerin (NITROSTAT) 0 3 mg SL tablet    Current Facility-Administered Medications:     sodium chloride 0 9 % infusion, 50 mL/hr, Intravenous, Continuous, 50 mL/hr at 01/06/21 1256    Allergies   Allergen Reactions    Ceftin [Cefuroxime] Itching     However, has tolerated Cefazolin and Pip-Tazo since, which have different side chains  Be cautious with / avoid 2nd, 3rd, or 4th Gen Cephs that have similar side chains to Cefuroxime  Objective     BP (!) 179/90   Pulse 77   Temp 98 1 °F (36 7 °C) (Temporal)   Resp 18   SpO2 98%     PHYSICAL EXAM    Gen: NAD AAOx3  CV: S1S2 RRR no m/r/g  CHEST: Clear b/l no c/r/w  ABD: soft, +BS NT/ND  EXT: no edema    ASSESSMENT/PLAN:  This is a 77y o  year old male here for EGD/colonoscopy, and he is stable and optimized for his procedure

## 2021-01-06 NOTE — DISCHARGE INSTRUCTIONS
Diverticulosis   WHAT YOU NEED TO KNOW:   What is diverticulosis? Diverticulosis is a condition that causes small pockets called diverticula to form in your intestine  These pockets make it difficult for bowel movements to pass through your digestive system  What causes diverticulosis? Diverticula form when muscles have to work hard to move bowel movements through the intestine  The force causes bulges to form at weak areas in the intestine  This may happen if you eat foods that are low in fiber  Fiber helps give your bowel movements more bulk so they are larger and easier to move through your colon  The following may increase your risk of diverticulosis:  · A history of constipation    · Age 36 or older    · Obesity    · Lack of exercise    What are the signs and symptoms of diverticulosis? Diverticulosis usually does not cause any signs or symptoms  It may cause any of the following in some people:  · Pain or discomfort in your lower abdomen    · Abdominal bloating    · Constipation or diarrhea    How is diverticulosis diagnosed? Your healthcare provider will examine you and ask about your bowel movements, diet, and symptoms  He or she will also ask about any medical conditions you have or medicines you take  You may need any of the following:  · Blood tests  may be done to check for signs of inflammation  · A barium enema  is an x-ray of your colon that may show diverticula  A tube is put into your anus, and a liquid called barium is put through the tube  Barium is used so that healthcare providers can see your colon more clearly  · Flexible sigmoidoscopy  is a test to look for any changes in your lower intestines and rectum  It may also show the cause of any bleeding or pain  A soft, bendable tube with a light on the end will be put into your anus  It will then be moved forward into your intestine  · A colonoscopy  is used to look at your whole colon   A scope (long bendable tube with a light on the end) is used to take pictures  This test may show diverticula  · A CT scan , or CAT scan, may show diverticula  You may be given contrast liquid before the scan  Tell the healthcare provider if you have ever had an allergic reaction to contrast liquid  How is diverticulosis managed? The goal of treatment is to manage any symptoms you have and prevent other problems such as diverticulitis  Diverticulitis is swelling or infection of the diverticula  Your healthcare provider may recommend any of the following:  · Eat a variety of high-fiber foods  High-fiber foods help you have regular bowel movements  High-fiber foods include cooked beans, fruits, vegetables, and some cereals  Most adults need 25 to 35 grams of fiber each day  Your healthcare provider may recommend that you have more  Ask your healthcare provider how much fiber you need  Increase fiber slowly  You may have abdominal discomfort, bloating, and gas if you add fiber to your diet too quickly  You may need to take a fiber supplement if you are not getting enough fiber from food  · Medicines  to soften your bowel movements may be given  You may also need medicines to treat symptoms such as bloating and pain  · Drink liquids as directed  You may need to drink 2 to 3 liters (8 to 12 cups) of liquids every day  Ask your healthcare provider how much liquid to drink each day and which liquids are best for you  · Apply heat  on your abdomen for 20 to 30 minutes every 2 hours for as many days as directed  Heat helps decrease pain and muscle spasms  How can I help prevent diverticulitis or other symptoms? The following may help decrease your risk for diverticulitis or symptoms, such as bleeding  Talk to your provider about these or other things you can do to prevent problems that may occur with diverticulosis  · Exercise regularly  Ask your healthcare provider about the best exercise plan for you   Exercise can help you have regular bowel movements  Get 30 minutes of exercise on most days of the week  · Maintain a healthy weight  Ask your healthcare provider how much you should weigh  Ask him or her to help you create a weight loss plan if you are overweight  · Do not smoke  Nicotine and other chemicals in cigarettes increase your risk for diverticulitis  Ask your healthcare provider for information if you currently smoke and need help to quit  E-cigarettes or smokeless tobacco still contain nicotine  Talk to your healthcare provider before you use these products  · Ask your healthcare provider if it is safe to take NSAIDs  NSAIDs may increase your risk of diverticulitis  When should I seek immediate care? · You have severe pain on the left side of your lower abdomen  · Your bowel movements are bright or dark red  When should I contact my healthcare provider? · You have a fever and chills  · You feel dizzy or lightheaded  · You have nausea, or you are vomiting  · You have a change in your bowel movements  · You have questions or concerns about your condition or care  CARE AGREEMENT:   You have the right to help plan your care  Learn about your health condition and how it may be treated  Discuss treatment options with your healthcare providers to decide what care you want to receive  You always have the right to refuse treatment  The above information is an  only  It is not intended as medical advice for individual conditions or treatments  Talk to your doctor, nurse or pharmacist before following any medical regimen to see if it is safe and effective for you  © Copyright [address] Information is for End User's use only and may not be sold, redistributed or otherwise used for commercial purposes   All illustrations and images included in CareNotes® are the copyrighted property of [employer]  or [address] [place] [address]

## 2021-01-11 ENCOUNTER — TELEPHONE (OUTPATIENT)
Dept: GASTROENTEROLOGY | Facility: CLINIC | Age: 76
End: 2021-01-11

## 2021-01-11 NOTE — RESULT ENCOUNTER NOTE
Benign findings  No signs of inflammatory bowel disease or microscopic colitis suspect irritation is prep effect  Ladi Economy to use Imodium until office visit

## 2021-01-11 NOTE — TELEPHONE ENCOUNTER
Pt states he had a colon 1/6 w/ KK; saw in [facility] [address] that results show mild accute colitis  Pt has appt on the 20th but his wife asked him to call to see if there are meds he should start now? CB# [phone]

## 2021-01-18 ENCOUNTER — OFFICE VISIT (OUTPATIENT)
Dept: FAMILY MEDICINE CLINIC | Facility: HOSPITAL | Age: 76
End: 2021-01-18
Payer: COMMERCIAL

## 2021-01-18 VITALS
WEIGHT: 173 LBS | DIASTOLIC BLOOD PRESSURE: 70 MMHG | SYSTOLIC BLOOD PRESSURE: 136 MMHG | BODY MASS INDEX: 24.22 KG/M2 | HEIGHT: 71 IN | TEMPERATURE: 97.2 F | HEART RATE: 94 BPM

## 2021-01-18 DIAGNOSIS — C90.00 MULTIPLE MYELOMA NOT HAVING ACHIEVED REMISSION (HCC): ICD-10-CM

## 2021-01-18 DIAGNOSIS — I10 ESSENTIAL HYPERTENSION: ICD-10-CM

## 2021-01-18 DIAGNOSIS — E11.9 TYPE 2 DIABETES MELLITUS WITHOUT COMPLICATION, WITHOUT LONG-TERM CURRENT USE OF INSULIN (HCC): Primary | ICD-10-CM

## 2021-01-18 DIAGNOSIS — E78.5 DYSLIPIDEMIA: ICD-10-CM

## 2021-01-18 PROCEDURE — 99214 OFFICE O/P EST MOD 30 MIN: CPT | Performed by: INTERNAL MEDICINE

## 2021-01-18 NOTE — ASSESSMENT & PLAN NOTE
Lab Results   Component Value Date    HGBA1C 5 8 (H) 12/30/2020   DM type 2 with microalbuminuria - controlled with A1C 5 8 - con't current Metformin, urged to watch sugars/carbs, recheck BW in 6 mo - order given, on ACE/statin, UTD on foot exam (9/20) and eye exam (7/19 - 2 yrs)

## 2021-01-18 NOTE — ASSESSMENT & PLAN NOTE
Still with mild normocytic anemia, mild elevation of WBC noted as well, plt wnl, con't f/u as per Onc

## 2021-01-18 NOTE — PROGRESS NOTES
Assessment/Plan:    Type 2 diabetes mellitus without complication, without long-term current use of insulin (HCC)    Lab Results   Component Value Date    HGBA1C 5 8 (H) 12/30/2020   DM type 2 with microalbuminuria - controlled with A1C 5 8 - con't current Metformin, urged to watch sugars/carbs, recheck BW in 6 mo - order given, on ACE/statin, UTD on foot exam (9/20) and eye exam (7/19 - 2 yrs)    Dyslipidemia  LDL at goal, con't current meds, urged healthy diet and keep active    Multiple myeloma not having achieved remission ([address] )  Still with mild normocytic anemia, mild elevation of WBC noted as well, plt wnl, con't f/u as per Onc    Essential hypertension  BP at goal by end of appt, con't current meds       Diagnoses and all orders for this visit:    Type 2 diabetes mellitus without complication, without long-term current use of insulin (HCC)  -     Hemoglobin A1C; Future  -     Glucose, fasting; Future  -     Hemoglobin A1C  -     Glucose, fasting    Dyslipidemia    Multiple myeloma not having achieved remission [facility])    Essential hypertension      Colonoscopy 12/20 - 5 yrs    COVID vaccine reviewed        Subjective:      Patient ID: Glenn Stone is a 77 y o  male  HPI Pt here for follow up appt and BW results    BW results were d/w pt in detail: FBS/A1C 97/5 8, rest of CMP/PSA/TSH were wnl, CBC with WBC up a bit at 12 7 and H/H low at 10 1/32 7, plt wnl, MCV 80, FLP with TC down at 79 and HDL low at 24, LDL and TG at goal, urine microalbumin:cr ratio was up a bit at 34  Def of controlled vs uncontrolled DM was reviewed  Diet was reviewed - wgt down 3 lbs from Sept 2020  He does no formal exercise but states "I'm gonna start again"  He was asked about diet and he states "OK"  He does have occasional sweets and is not really watching his carbs  He is taking his DM meds as directed  He is UTD on foot (9/20) and eye exam (7/19 - 2 yrs)  He is on statin and ACE  Goal FLP was d/w pt in detail  Diet/exercise reviewed as noted above  He is taking his Atorvastatin daily as directed w/o Se  He notes no stroke/TIA symptoms/CP  Normocytic anemia reviewed - has been mildly anemic for over 2 yrs now  Has dx of MM  Follows with Onc annually  He just had colonoscopy and EGD for ongoing GI issues and no malignancy noted  He con't to have frequent stools  He notes no blood in the stool  He will intermittently diarrhea  He notes intermittent bloating from frequent gas  He has an appt on Wed  He does use Imodium as needed for the diarrhea with benefit  He has not been able to id a specific trigger for the symptoms  BP above goal today on presentation and meds were reviewed and no changes have occurred  He denies missing doses of meds or SE with the meds  He does not check his BP outside the office regularly  He notes no frequent HA's/dizziness/double vision/CP  Colonoscopy 12/20 - 5 yrs    Review of Systems   Constitutional: Positive for appetite change  Negative for chills, fatigue and fever  HENT: Negative for congestion and sinus pain  Eyes: Negative for pain and visual disturbance  Respiratory: Negative for cough and shortness of breath  Cardiovascular: Negative for chest pain, palpitations and leg swelling  Gastrointestinal: Positive for abdominal pain and diarrhea  Negative for blood in stool, constipation, nausea and vomiting  Genitourinary: Negative for difficulty urinating and dysuria  Musculoskeletal: Positive for arthralgias  Negative for myalgias  Skin: Negative for rash and wound  Neurological: Negative for dizziness and headaches  Hematological: Does not bruise/bleed easily  Psychiatric/Behavioral: Negative for behavioral problems and confusion  Objective:    /70   Pulse 94   Temp (!) 97 2 °F (36 2 °C) (Temporal)   Ht 5' 11" (1 803 m)   Wt 78 5 kg (173 lb)   BMI 24 13 kg/m²      Physical Exam  Vitals signs and nursing note reviewed  Constitutional:       General: He is not in acute distress  Appearance: He is well-developed  He is not ill-appearing  HENT:      Head: Normocephalic and atraumatic  Eyes:      General:         Right eye: No discharge  Left eye: No discharge  Conjunctiva/sclera: Conjunctivae normal    Neck:      Musculoskeletal: Neck supple  Trachea: No tracheal deviation  Cardiovascular:      Rate and Rhythm: Normal rate and regular rhythm  Heart sounds: No murmur  Pulmonary:      Effort: Pulmonary effort is normal  No respiratory distress  Breath sounds: Normal breath sounds  No wheezing, rhonchi or rales  Abdominal:      General: There is no distension  Palpations: Abdomen is soft  Tenderness: There is no abdominal tenderness  There is no guarding or rebound  Musculoskeletal:      Right lower leg: No edema  Left lower leg: No edema  Skin:     General: Skin is warm and dry  Coloration: Skin is not pale  Findings: No rash  Neurological:      General: No focal deficit present  Mental Status: He is alert  Motor: No abnormal muscle tone  Gait: Gait normal    Psychiatric:         Mood and Affect: Mood normal          Behavior: Behavior normal          Thought Content:  Thought content normal          Judgment: Judgment normal

## 2021-01-20 ENCOUNTER — OFFICE VISIT (OUTPATIENT)
Dept: GASTROENTEROLOGY | Facility: CLINIC | Age: 76
End: 2021-01-20
Payer: COMMERCIAL

## 2021-01-20 VITALS
HEIGHT: 71 IN | HEART RATE: 79 BPM | WEIGHT: 170.4 LBS | BODY MASS INDEX: 23.85 KG/M2 | SYSTOLIC BLOOD PRESSURE: 106 MMHG | DIASTOLIC BLOOD PRESSURE: 56 MMHG

## 2021-01-20 DIAGNOSIS — K22.70 BARRETT'S ESOPHAGUS WITHOUT DYSPLASIA: ICD-10-CM

## 2021-01-20 DIAGNOSIS — K21.00 GASTROESOPHAGEAL REFLUX DISEASE WITH ESOPHAGITIS: ICD-10-CM

## 2021-01-20 DIAGNOSIS — Z85.038 PERSONAL HISTORY OF COLON CANCER: ICD-10-CM

## 2021-01-20 DIAGNOSIS — K22.2 SCHATZKI'S RING OF DISTAL ESOPHAGUS: ICD-10-CM

## 2021-01-20 DIAGNOSIS — R13.19 ESOPHAGEAL DYSPHAGIA: ICD-10-CM

## 2021-01-20 DIAGNOSIS — R19.4 CHANGE IN BOWEL HABIT: Primary | ICD-10-CM

## 2021-01-20 PROCEDURE — 99214 OFFICE O/P EST MOD 30 MIN: CPT | Performed by: NURSE PRACTITIONER

## 2021-01-20 PROCEDURE — 3074F SYST BP LT 130 MM HG: CPT | Performed by: NURSE PRACTITIONER

## 2021-01-20 PROCEDURE — 3078F DIAST BP <80 MM HG: CPT | Performed by: NURSE PRACTITIONER

## 2021-01-20 PROCEDURE — 1160F RVW MEDS BY RX/DR IN RCRD: CPT | Performed by: NURSE PRACTITIONER

## 2021-01-20 PROCEDURE — 3008F BODY MASS INDEX DOCD: CPT | Performed by: NURSE PRACTITIONER

## 2021-01-20 PROCEDURE — 1036F TOBACCO NON-USER: CPT | Performed by: NURSE PRACTITIONER

## 2021-01-20 RX ORDER — OMEPRAZOLE 40 MG/1
40 CAPSULE, DELAYED RELEASE ORAL DAILY
Qty: 90 CAPSULE | Refills: 3 | Status: SHIPPED | OUTPATIENT
Start: 2021-01-20 | End: 2022-03-17 | Stop reason: SDUPTHER

## 2021-01-20 RX ORDER — MESALAMINE 4 G/60ML
4 ENEMA RECTAL
Qty: 30 BOTTLE | Refills: 1 | Status: SHIPPED | OUTPATIENT
Start: 2021-01-20 | End: 2021-03-05 | Stop reason: SDUPTHER

## 2021-01-20 NOTE — PATIENT INSTRUCTIONS
Try the mesalamine enemas at bedtime for 1 week to see if this improves your bowel pattern   continue to use Imodium and Gas-X as needed daily   continue to take your omeprazole 40 mg daily   continue to take small bites, cut food into small pieces, chew thoroughly, eat slowly, alternate bites of food with sips of fluid

## 2021-02-17 DIAGNOSIS — I10 ESSENTIAL HYPERTENSION: ICD-10-CM

## 2021-02-17 PROCEDURE — 4010F ACE/ARB THERAPY RXD/TAKEN: CPT | Performed by: NURSE PRACTITIONER

## 2021-02-17 RX ORDER — LISINOPRIL 20 MG/1
TABLET ORAL
Qty: 90 TABLET | Refills: 1 | Status: SHIPPED | OUTPATIENT
Start: 2021-02-17 | End: 2021-05-20 | Stop reason: HOSPADM

## 2021-02-23 LAB
ERYTHROCYTE [DISTWIDTH] IN BLOOD BY AUTOMATED COUNT: 15 % (ref 11.6–15.4)
HCT VFR BLD AUTO: 32 % (ref 37.5–51)
HGB BLD-MCNC: 9.9 G/DL (ref 13–17.7)
MCH RBC QN AUTO: 23.5 PG (ref 26.6–33)
MCHC RBC AUTO-ENTMCNC: 30.9 G/DL (ref 31.5–35.7)
MCV RBC AUTO: 76 FL (ref 79–97)
PLATELET # BLD AUTO: 156 X10E3/UL (ref 150–450)
RBC # BLD AUTO: 4.21 X10E6/UL (ref 4.14–5.8)
WBC # BLD AUTO: 16.2 X10E3/UL (ref 3.4–10.8)

## 2021-03-03 ENCOUNTER — TELEPHONE (OUTPATIENT)
Dept: CARDIOLOGY CLINIC | Facility: CLINIC | Age: 76
End: 2021-03-03

## 2021-03-03 DIAGNOSIS — Z23 ENCOUNTER FOR IMMUNIZATION: ICD-10-CM

## 2021-03-03 NOTE — TELEPHONE ENCOUNTER
Pt called office today stating he has gained 7 lbs in the last 6 days also his R leg swelling ( Edema ) does he have to take extra pill Lasix 20 mg what should he do?

## 2021-03-04 NOTE — TELEPHONE ENCOUNTER
Dr Kelvin Toledo in office stated he can take his Lasix 40mg in the morning and 20mg in the afternoon  Pt aware and verbalized understanding  Pt notified to call back with an update

## 2021-03-05 ENCOUNTER — OFFICE VISIT (OUTPATIENT)
Dept: GASTROENTEROLOGY | Facility: CLINIC | Age: 76
End: 2021-03-05
Payer: COMMERCIAL

## 2021-03-05 VITALS
DIASTOLIC BLOOD PRESSURE: 64 MMHG | WEIGHT: 175 LBS | SYSTOLIC BLOOD PRESSURE: 138 MMHG | HEIGHT: 71 IN | BODY MASS INDEX: 24.5 KG/M2

## 2021-03-05 DIAGNOSIS — K22.2 SCHATZKI'S RING OF DISTAL ESOPHAGUS: ICD-10-CM

## 2021-03-05 DIAGNOSIS — R19.4 CHANGE IN BOWEL HABIT: Primary | ICD-10-CM

## 2021-03-05 DIAGNOSIS — R13.19 ESOPHAGEAL DYSPHAGIA: ICD-10-CM

## 2021-03-05 DIAGNOSIS — K21.00 GASTROESOPHAGEAL REFLUX DISEASE WITH ESOPHAGITIS WITHOUT HEMORRHAGE: ICD-10-CM

## 2021-03-05 DIAGNOSIS — Z85.038 PERSONAL HISTORY OF COLON CANCER: ICD-10-CM

## 2021-03-05 DIAGNOSIS — K22.70 BARRETT'S ESOPHAGUS WITHOUT DYSPLASIA: ICD-10-CM

## 2021-03-05 PROCEDURE — 1036F TOBACCO NON-USER: CPT | Performed by: NURSE PRACTITIONER

## 2021-03-05 PROCEDURE — 1160F RVW MEDS BY RX/DR IN RCRD: CPT | Performed by: NURSE PRACTITIONER

## 2021-03-05 PROCEDURE — 99214 OFFICE O/P EST MOD 30 MIN: CPT | Performed by: NURSE PRACTITIONER

## 2021-03-05 PROCEDURE — 3008F BODY MASS INDEX DOCD: CPT | Performed by: NURSE PRACTITIONER

## 2021-03-05 PROCEDURE — 3078F DIAST BP <80 MM HG: CPT | Performed by: NURSE PRACTITIONER

## 2021-03-05 PROCEDURE — 3075F SYST BP GE 130 - 139MM HG: CPT | Performed by: NURSE PRACTITIONER

## 2021-03-05 RX ORDER — MESALAMINE 4 G/60ML
4 ENEMA RECTAL
Qty: 30 BOTTLE | Refills: 1 | Status: SHIPPED | OUTPATIENT
Start: 2021-03-05 | End: 2021-05-20 | Stop reason: HOSPADM

## 2021-03-05 RX ORDER — DIPHENOXYLATE HYDROCHLORIDE AND ATROPINE SULFATE 2.5; .025 MG/1; MG/1
1 TABLET ORAL 4 TIMES DAILY PRN
Qty: 30 TABLET | Refills: 1 | Status: SHIPPED | OUTPATIENT
Start: 2021-03-05 | End: 2021-05-20 | Stop reason: HOSPADM

## 2021-03-05 NOTE — PROGRESS NOTES
[id number] - Outpatient Follow-up Note  Yehuda Edwards 77 y o  male MRN: [id number]  Encounter: [id number]    ASSESSMENT AND PLAN:      1  Change in bowel habit  2  Personal history of colon cancer  Several months of weekly altered bowel pattern with stool frequency, urgency generally formed but occasionally loose, preceded with gas pains  and occasional fecal incontinence    multiple stools from 5-252 days per week otherwise notes 0-4 stools per day   Previously 1 formed stool daily  Colonoscopy 01/06/2021 without evidence for CRC, no polyps (previous history CRC)  There was some erythematous rectal mucosa noted as proctitis versus prep effect  Pathology showed some mild nonspecific acute proctitis  Used Rowasa enemas for 1 week without improvement although he was unable to hold the enema  Mild leukocytosis persists on recent CBC although no fevers, abdominal pain or tenderness on exam    No pathology evidence for microscopic colitis  Imodium only minimally helpful  Could this be inflammatory or infectious? Occult constipation with large stool burden? Case and plan was discussed with Dr LOYA [facility]       - will check XR abdomen obstruction series; Future to evaluate for stool burden, obstruction  - continue mesalamine (ROWASA) 4 g; Insert 60 mL (4 g total) into the rectum daily at bedtime  Dispense: 30 Bottle; Refill: 1  - advised to use the Rowasa enemas for 4-6 weeks and should place enema while lying supine with knees raised, hold as long as possible  - also recommended raised knee position or squatty potty for defecation  - consider pelvic PT to retrain muscles  - may try diphenoxylate-atropine (LOMOTIL) 2 5-0 025 mg per tablet; Take 1 tablet by mouth 4 (four) times a day as needed for diarrhea  Dispense: 30 tablet;  Refill: 1  - may continue to use Imodium on days of stool frequency p r n   - strongly recommended follow-up with oncologist for leukocytosis with no overt signs of infection    3  Esophageal dysphagia  4  Roberts's esophagus without dysplasia  5  Schatzki's ring of distal esophagus  6  Gastroesophageal reflux disease with esophagitis without hemorrhage  No overt stricture, recurrent Schatzki's ring identified with EGD 01/06/2021 but esophagus dilated empirically to [address] [place] with complete resolution of dysphagia  There was a mild nodular salmon mucosa at the Z-line although pathology negative for Roberts's  May be a component of presbyesophagus associated with the symptoms, but remain well controlled with daily PPI  - continue omeprazole 40 mg daily  -reviewed/advised to continue swallowing safety, reflux diet, lifestyle modifications  -consider barium swallow for recurrent dysphagia    Followup Appointment:  6 weeks  ______________________________________________________________________    Chief Complaint   Patient presents with    Follow-up     no better     HPI:   80-year-old male patient returns to the office, accompanied by his wife to follow up his change in bowel habit which persists  The patient and wife expressed considerable  frustration as 2 days a week he is unable to leave the house as he experiences 5-25 urgent, nonbloody small volume stools per day ranging from formed to liquid later on  Feels like he has a complete evacuation with each stool but then gets the urge soon after to defecate again  Occasional fecal incontinence due to urgency  Stools are frequently preceded by "gas pains" and he does pass flatus  He otherwise denies any abdominal pain, no rectal pain  Tried the Rowasa enemas for 1 week and they "did not help" although he was unable to hold them  It appears he was taking them while standing up making it difficult to hold them  Denies any fevers or chills, nausea vomiting, UGI or alarm symptoms  Taking daily PPI without side effects or symptoms  Denies melena, hematochezia  Remains fatigue    Five days per week he reports 0-4 urgent but formed stools daily that feel complete  Appetite is normal   Weight is stable      Historical Information   Past Medical History:   Diagnosis Date    Arthritis     [facility]rial [facility] [address] [facility]     Cataract     Colitis     [facility]     Diabetes mellitus type II, non insulin dependent ([email or web address]     Fatty liver     History of chemotherapy     History of radiation therapy     History of shingles     Hypertension     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     Pneumonia     Skin cancer      Past Surgical History:   Procedure Laterality Date    AORTIC VALVE REPLACEMENT      COLECTOMY      COLONOSCOPY  2019    Prior right hemicolectomy    CORONARY ARTERY BYPASS GRAFT      DENTAL SURGERY      KNEE SURGERY      LYMPHADENECTOMY      OTHER SURGICAL HISTORY      MAZE PROCEDURE    TN TOTAL KNEE ARTHROPLASTY Left 2019    Procedure: ARTHROPLASTY LEFT KNEE TOTAL;  Surgeon: Sowmya Guillen MD;  Location: [facility] OR;  Service: Orthopedics    SKIN BIOPSY      UPPER GASTROINTESTINAL ENDOSCOPY  2019    Schatzki ring     Social History     Substance and Sexual Activity   Alcohol Use Yes    Frequency: 2-4 times a month    Drinks per session: 1 or 2    Binge frequency: Never    Comment: has not drank in over a month     Social History     Substance and Sexual Activity   Drug Use No     Social History     Tobacco Use   Smoking Status Former Smoker    Packs/day: 1 00    Years: 40 00    Pack years: 40 00    Types: Cigarettes    Quit date:     Years since quittin 1   Smokeless Tobacco Never Used     Family History   Problem Relation Age of Onset    Heart block Family     Coronary artery disease Mother     Thrombosis Mother     Hypertension Mother     Arthritis Mother     Hyperlipidemia Mother     Diabetes Father     Hypertension Father     Arthritis Father     Sudden death Father         cardiac    Colon cancer Paternal Grandfather     Breast cancer Sister     Heart disease Brother Coronary stents         Current Outpatient Medications:     aspirin 81 MG tablet    atorvastatin (LIPITOR) 40 mg tablet    furosemide (LASIX) 20 mg tablet    lisinopril (ZESTRIL) 20 mg tablet    metFORMIN (GLUCOPHAGE-XR) 500 mg 24 hr tablet    metoprolol succinate (TOPROL-XL) 100 mg 24 hr tablet    Multiple Vitamins-Minerals (ICAPS AREDS 2) CAPS    nitroglycerin (NITROSTAT) 0 3 mg SL tablet    omeprazole (PriLOSEC) 40 MG capsule    ascorbic acid (VITAMIN C) 500 mg tablet    diphenoxylate-atropine (LOMOTIL) 2 5-0 025 mg per tablet    ferrous sulfate 324 MG TBEC    mesalamine (ROWASA) 4 g    Multiple Vitamin (MULTI-VITAMIN) tablet  Allergies   Allergen Reactions    Ceftin [Cefuroxime] Itching     However, has tolerated Cefazolin and Pip-Tazo since, which have different side chains  Be cautious with / avoid 2nd, 3rd, or 4th Gen Cephs that have similar side chains to Cefuroxime  Reviewed medications and allergies and updated as indicated    ROS:  All systems were reviewed and positives noted as per HPI  All other systems were reported as negative  PHYSICAL EXAM:    Blood pressure 138/64, height 5' 11" (1 803 m), weight 79 4 kg (175 lb)  Body mass index is 24 41 kg/m²  General Appearance: NAD, cooperative, alert  Head:  Normocephalic, atraumatic  Eyes: Anicteric, PERRLA, EOMI  ENT:  Normal external appearance, normal mucosa  Neck:  Supple, symmetrical, trachea midline  Resp:  Clear to auscultation bilaterally; no rales, rhonchi or wheezing; respirations unlabored   CV:  S1 S2, Regular rate and rhythm; no murmur, rub, or gallop  GI:  Soft, non-tender, non-distended; normal bowel sounds; no masses, no organomegaly   Rectal: Deferred  Musculoskeletal: No cyanosis, clubbing or edema  Normal ROM    Skin:  No jaundice, rashes, or lesions   Heme/Lymph: No palpable cervical lymphadenopathy  Psych: Normal affect, good eye contact  Neuro: No gross deficits, AAOx3    Lab Results:   Reviewed CBC results 02/23/2021  Lab Results   Component Value Date    WBC 16 2 (H) 02/23/2021    HGB 9 9 (L) 02/23/2021    HCT 32 0 (L) 02/23/2021    MCV 76 (L) 02/23/2021     02/23/2021     Lab Results   Component Value Date     05/19/2017    K 3 6 12/30/2020     12/30/2020    CO2 27 12/30/2020    ANIONGAP 7 02/22/2014    BUN 14 12/30/2020    CREATININE 0 95 12/30/2020    GLUCOSE 204 (H) 10/13/2017    GLUF 167 (H) 08/05/2016    CALCIUM 9 2 09/12/2019    AST 12 12/30/2020    ALT 9 12/30/2020    ALKPHOS 121 (H) 09/11/2019    PROT 6 5 05/19/2017    BILITOT 0 9 05/19/2017    EGFR 71 09/12/2019     No results found for: IRON, TIBC, FERRITIN  Lab Results   Component Value Date    LIPASE 173 11/25/2018       Radiology Results:   No results found

## 2021-03-09 ENCOUNTER — HOSPITAL ENCOUNTER (OUTPATIENT)
Dept: RADIOLOGY | Facility: HOSPITAL | Age: 76
Discharge: HOME | End: 2021-03-09
Payer: COMMERCIAL

## 2021-03-09 DIAGNOSIS — R19.4 CHANGE IN BOWEL HABIT: ICD-10-CM

## 2021-03-09 PROCEDURE — 74022 RADEX COMPL AQT ABD SERIES: CPT

## 2021-03-09 NOTE — TELEPHONE ENCOUNTER
Pt called with updated on legs swelling   Pt stated during the Morning leg is fine once he get's up to go shower he notices his leg swelling up at night leg is fine sand goes back to normal

## 2021-03-11 DIAGNOSIS — I25.10 CORONARY ARTERY DISEASE INVOLVING NATIVE CORONARY ARTERY OF NATIVE HEART WITHOUT ANGINA PECTORIS: Primary | ICD-10-CM

## 2021-03-13 DIAGNOSIS — E11.9 TYPE 2 DIABETES MELLITUS WITHOUT COMPLICATION, WITHOUT LONG-TERM CURRENT USE OF INSULIN (HCC): ICD-10-CM

## 2021-03-13 LAB
BUN SERPL-MCNC: 14 MG/DL (ref 8–27)
BUN/CREAT SERPL: 14 (ref 10–24)
CALCIUM SERPL-MCNC: 9 MG/DL (ref 8.6–10.2)
CHLORIDE SERPL-SCNC: 99 MMOL/L (ref 96–106)
CO2 SERPL-SCNC: 31 MMOL/L (ref 20–29)
CREAT SERPL-MCNC: 1.02 MG/DL (ref 0.76–1.27)
GLUCOSE SERPL-MCNC: 107 MG/DL (ref 65–99)
POTASSIUM SERPL-SCNC: 3.1 MMOL/L (ref 3.5–5.2)
SL AMB EGFR AFRICAN AMERICAN: 83 ML/MIN/1.73
SL AMB EGFR NON AFRICAN AMERICAN: 72 ML/MIN/1.73
SODIUM SERPL-SCNC: 145 MMOL/L (ref 134–144)

## 2021-03-14 RX ORDER — METFORMIN HYDROCHLORIDE 500 MG/1
TABLET, EXTENDED RELEASE ORAL
Qty: 90 TABLET | Refills: 2 | Status: SHIPPED | OUTPATIENT
Start: 2021-03-14 | End: 2021-12-03

## 2021-03-19 ENCOUNTER — TELEPHONE (OUTPATIENT)
Dept: CARDIOLOGY CLINIC | Facility: CLINIC | Age: 76
End: 2021-03-19

## 2021-03-23 ENCOUNTER — TRANSCRIBE ORDERS (OUTPATIENT)
Dept: ADMINISTRATIVE | Facility: HOSPITAL | Age: 76
End: 2021-03-23

## 2021-03-23 DIAGNOSIS — Z85.79 H/O MULTIPLE MYELOMA: ICD-10-CM

## 2021-03-23 DIAGNOSIS — R63.4 WEIGHT LOSS: ICD-10-CM

## 2021-03-23 DIAGNOSIS — C85.90 LYMPHOMA, UNSPECIFIED BODY REGION, UNSPECIFIED LYMPHOMA TYPE (HCC): Primary | ICD-10-CM

## 2021-03-26 ENCOUNTER — HOSPITAL ENCOUNTER (OUTPATIENT)
Dept: CT IMAGING | Facility: HOSPITAL | Age: 76
Discharge: HOME | End: 2021-03-26
Payer: COMMERCIAL

## 2021-03-26 DIAGNOSIS — C85.90 LYMPHOMA, UNSPECIFIED BODY REGION, UNSPECIFIED LYMPHOMA TYPE (HCC): ICD-10-CM

## 2021-03-26 PROCEDURE — 74177 CT ABD & PELVIS W/CONTRAST: CPT

## 2021-03-26 PROCEDURE — 71260 CT THORAX DX C+: CPT

## 2021-03-26 PROCEDURE — G1004 CDSM NDSC: HCPCS

## 2021-03-26 RX ADMIN — IOHEXOL 100 ML: 350 INJECTION, SOLUTION INTRAVENOUS at 14:45

## 2021-04-01 ENCOUNTER — HOSPITAL ENCOUNTER (EMERGENCY)
Facility: HOSPITAL | Age: 76
Discharge: HOME | End: 2021-04-01
Attending: EMERGENCY MEDICINE | Admitting: EMERGENCY MEDICINE
Payer: COMMERCIAL

## 2021-04-01 VITALS
SYSTOLIC BLOOD PRESSURE: 174 MMHG | RESPIRATION RATE: 18 BRPM | DIASTOLIC BLOOD PRESSURE: 80 MMHG | TEMPERATURE: 99.3 F | BODY MASS INDEX: 24.41 KG/M2 | HEART RATE: 70 BPM | OXYGEN SATURATION: 95 % | WEIGHT: 175 LBS

## 2021-04-01 DIAGNOSIS — D64.9 ANEMIA: ICD-10-CM

## 2021-04-01 DIAGNOSIS — J12.82 PNEUMONIA DUE TO COVID-19 VIRUS: Primary | ICD-10-CM

## 2021-04-01 DIAGNOSIS — U07.1 PNEUMONIA DUE TO COVID-19 VIRUS: Primary | ICD-10-CM

## 2021-04-01 LAB
ALBUMIN SERPL BCP-MCNC: 3.3 G/DL (ref 3.5–5)
ALP SERPL-CCNC: 136 U/L (ref 46–116)
ALT SERPL W P-5'-P-CCNC: 20 U/L (ref 12–78)
ANION GAP SERPL CALCULATED.3IONS-SCNC: 10 MMOL/L (ref 4–13)
APTT PPP: 33 SECONDS (ref 23–37)
AST SERPL W P-5'-P-CCNC: 22 U/L (ref 5–45)
BASOPHILS # BLD AUTO: 0.05 THOUSANDS/ΜL (ref 0–0.1)
BASOPHILS NFR BLD AUTO: 1 % (ref 0–1)
BILIRUB SERPL-MCNC: 0.9 MG/DL (ref 0.2–1)
BILIRUB UR QL STRIP: NEGATIVE
BUN SERPL-MCNC: 14 MG/DL (ref 5–25)
CALCIUM ALBUM COR SERPL-MCNC: 9.4 MG/DL (ref 8.3–10.1)
CALCIUM SERPL-MCNC: 8.8 MG/DL (ref 8.3–10.1)
CHLORIDE SERPL-SCNC: 98 MMOL/L (ref 100–108)
CLARITY UR: CLEAR
CO2 SERPL-SCNC: 33 MMOL/L (ref 21–32)
COLOR UR: YELLOW
CREAT SERPL-MCNC: 1.12 MG/DL (ref 0.6–1.3)
EOSINOPHIL # BLD AUTO: 0.14 THOUSAND/ΜL (ref 0–0.61)
EOSINOPHIL NFR BLD AUTO: 2 % (ref 0–6)
ERYTHROCYTE [DISTWIDTH] IN BLOOD BY AUTOMATED COUNT: 17.3 % (ref 11.6–15.1)
GFR SERPL CREATININE-BSD FRML MDRD: 64 ML/MIN/1.73SQ M
GLUCOSE SERPL-MCNC: 108 MG/DL (ref 65–140)
GLUCOSE UR STRIP-MCNC: NEGATIVE MG/DL
HCT VFR BLD AUTO: 35.5 % (ref 36.5–49.3)
HGB BLD-MCNC: 10.5 G/DL (ref 12–17)
HGB UR QL STRIP.AUTO: NEGATIVE
IMM GRANULOCYTES # BLD AUTO: 0.05 THOUSAND/UL (ref 0–0.2)
IMM GRANULOCYTES NFR BLD AUTO: 1 % (ref 0–2)
INR PPP: 1.14 (ref 0.84–1.19)
KETONES UR STRIP-MCNC: NEGATIVE MG/DL
LACTATE SERPL-SCNC: 1.3 MMOL/L (ref 0.5–2)
LEUKOCYTE ESTERASE UR QL STRIP: NEGATIVE
LYMPHOCYTES # BLD AUTO: 1.12 THOUSANDS/ΜL (ref 0.6–4.47)
LYMPHOCYTES NFR BLD AUTO: 13 % (ref 14–44)
MCH RBC QN AUTO: 23.5 PG (ref 26.8–34.3)
MCHC RBC AUTO-ENTMCNC: 29.6 G/DL (ref 31.4–37.4)
MCV RBC AUTO: 80 FL (ref 82–98)
MONOCYTES # BLD AUTO: 0.96 THOUSAND/ΜL (ref 0.17–1.22)
MONOCYTES NFR BLD AUTO: 12 % (ref 4–12)
NEUTROPHILS # BLD AUTO: 6.03 THOUSANDS/ΜL (ref 1.85–7.62)
NEUTS SEG NFR BLD AUTO: 71 % (ref 43–75)
NITRITE UR QL STRIP: NEGATIVE
NRBC BLD AUTO-RTO: 0 /100 WBCS
PH UR STRIP.AUTO: 7 [PH]
POTASSIUM SERPL-SCNC: 3 MMOL/L (ref 3.5–5.3)
PROCALCITONIN SERPL-MCNC: 0.09 NG/ML
PROT SERPL-MCNC: 7.3 G/DL (ref 6.4–8.2)
PROT UR STRIP-MCNC: NEGATIVE MG/DL
PROTHROMBIN TIME: 14.6 SECONDS (ref 11.6–14.5)
RBC # BLD AUTO: 4.46 MILLION/UL (ref 3.88–5.62)
SODIUM SERPL-SCNC: 141 MMOL/L (ref 136–145)
SP GR UR STRIP.AUTO: 1.01 (ref 1–1.03)
UROBILINOGEN UR QL STRIP.AUTO: 2 E.U./DL
WBC # BLD AUTO: 8.35 THOUSAND/UL (ref 4.31–10.16)

## 2021-04-01 PROCEDURE — 85730 THROMBOPLASTIN TIME PARTIAL: CPT | Performed by: EMERGENCY MEDICINE

## 2021-04-01 PROCEDURE — 85025 COMPLETE CBC W/AUTO DIFF WBC: CPT | Performed by: EMERGENCY MEDICINE

## 2021-04-01 PROCEDURE — 36415 COLL VENOUS BLD VENIPUNCTURE: CPT | Performed by: EMERGENCY MEDICINE

## 2021-04-01 PROCEDURE — 93005 ELECTROCARDIOGRAM TRACING: CPT

## 2021-04-01 PROCEDURE — 83605 ASSAY OF LACTIC ACID: CPT | Performed by: EMERGENCY MEDICINE

## 2021-04-01 PROCEDURE — 85610 PROTHROMBIN TIME: CPT | Performed by: EMERGENCY MEDICINE

## 2021-04-01 PROCEDURE — 87040 BLOOD CULTURE FOR BACTERIA: CPT | Performed by: EMERGENCY MEDICINE

## 2021-04-01 PROCEDURE — 81003 URINALYSIS AUTO W/O SCOPE: CPT | Performed by: EMERGENCY MEDICINE

## 2021-04-01 PROCEDURE — 99285 EMERGENCY DEPT VISIT HI MDM: CPT | Performed by: EMERGENCY MEDICINE

## 2021-04-01 PROCEDURE — 80053 COMPREHEN METABOLIC PANEL: CPT | Performed by: EMERGENCY MEDICINE

## 2021-04-01 PROCEDURE — 99284 EMERGENCY DEPT VISIT MOD MDM: CPT

## 2021-04-01 PROCEDURE — 84145 PROCALCITONIN (PCT): CPT | Performed by: EMERGENCY MEDICINE

## 2021-04-01 RX ORDER — POTASSIUM CHLORIDE 20 MEQ/1
40 TABLET, EXTENDED RELEASE ORAL ONCE
Status: COMPLETED | OUTPATIENT
Start: 2021-04-01 | End: 2021-04-01

## 2021-04-01 RX ADMIN — POTASSIUM CHLORIDE 40 MEQ: 1500 TABLET, EXTENDED RELEASE ORAL at 10:37

## 2021-04-01 NOTE — ED PROVIDER NOTES
History  Chief Complaint   Patient presents with    Fever - 75 years or older     tO ed FOR POSITIVE COVID TEST  pATIENT STATES THAT HE HAS HAD FEVER, chills, diarrhea, weakness and cough since MOnday  61-year-old man with history of diabetes type 2, coronary artery disease status post CABG, atrial fibrillation, status post aortic valve replacement, chronic diastolic heart failure, multiple myeloma not having achieved remission and personal history of malignant tumor of the cecum complains of fever, cough and fatigue for the past 3 days  He states symptoms began 2 hours after he received his 2nd COVID vaccine  Temperature was up to 103° overnight  He did not take any medications so far today  Denies chest pain, sputum production, GI and  symptoms  He has no known COVID exposure  Went to urgent care today  A rapid COVID test was positive and chest x-ray reportedly shows right lower lobe infiltrate and small left pleural effusion  Although patient denies dyspnea, notes from urgent care state his pulse ox dropped below 90% with ambulation  Here he did not drop below 93% with ambulation as heart rate did not go above 70 bpm            Prior to Admission Medications   Prescriptions Last Dose Informant Patient Reported? Taking?    Multiple Vitamin (MULTI-VITAMIN) tablet  Self No No   Sig: Take 1 tablet by mouth daily   Patient not taking: Reported on 1/20/2021   Multiple Vitamins-Minerals (ICAPS AREDS 2) CAPS  Self Yes No   Sig: Take 1 capsule by mouth daily   ascorbic acid (VITAMIN C) 500 mg tablet  Self No No   Sig: Take 1 tablet (500 mg total) by mouth 2 (two) times a day   Patient not taking: Reported on 1/20/2021   aspirin 81 MG tablet  Self Yes No   Sig: Take 1 tablet (81 mg total) by mouth daily   atorvastatin (LIPITOR) 40 mg tablet  Self No No   Sig: Take 1 tablet (40 mg total) by mouth daily   diphenoxylate-atropine (LOMOTIL) 2 5-0 025 mg per tablet   No No   Sig: Take 1 tablet by mouth 4 (four) times a day as needed for diarrhea   ferrous sulfate 324 MG TBEC  Self Yes No   Sig: Take 324 mg by mouth   furosemide (LASIX) 20 mg tablet  Self No No   Sig: TAKE 1 TABLET BY MOUTH TWICE A DAY   lisinopril (ZESTRIL) 20 mg tablet  Self No No   Sig: TAKE 1 TABLET BY MOUTH EVERY DAY   mesalamine (ROWASA) 4 g   No No   Sig: Insert 60 mL (4 g total) into the rectum daily at bedtime   metFORMIN (GLUCOPHAGE-XR) 500 mg 24 hr tablet   No No   Sig: TAKE 1 TABLET BY MOUTH EVERY DAY WITH DINNER   metoprolol succinate (TOPROL-XL) 100 mg 24 hr tablet  Self No No   Sig: Take 1 tablet (100 mg total) by mouth daily   nitroglycerin (NITROSTAT) 0 3 mg SL tablet  Self Yes No   Sig: Nitrostat 0 3 mg sublingual tablet   Place 1 tablet as needed by sublingual route as needed for 90 days     omeprazole (PriLOSEC) 40 MG capsule  Self No No   Sig: Take 1 capsule (40 mg total) by mouth daily      Facility-Administered Medications: None       Past Medical History:   Diagnosis Date    Arthritis     Atrial fibrillation (HCC)     Cataract     Colitis     Colon cancer (HCC)     Diabetes mellitus type II, non insulin dependent ([facility] [address] [facility]     Fatty liver     History of chemotherapy     History of radiation therapy     History of shingles 2018    Hypertension     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[facility] [address] [facility]     Pneumonia     Skin cancer        Past Surgical History:   Procedure Laterality Date    AORTIC VALVE REPLACEMENT      COLECTOMY      COLONOSCOPY  11/2019    Prior right hemicolectomy    CORONARY ARTERY BYPASS GRAFT      DENTAL SURGERY      KNEE SURGERY      LYMPHADENECTOMY      OTHER SURGICAL HISTORY      MAZE PROCEDURE    SC TOTAL KNEE ARTHROPLASTY Left 1/28/2019    Procedure: ARTHROPLASTY LEFT KNEE TOTAL;  Surgeon: Kayley Ibrahim MD;  Location: [facility] OR;  Service: Orthopedics    SKIN BIOPSY      UPPER GASTROINTESTINAL ENDOSCOPY  11/2019    Cricket gu       Family History   Problem Relation Age of Onset    Heart block Family     Coronary artery disease Mother     Thrombosis Mother     Hypertension Mother    Rachid Porter Arthritis Mother     Hyperlipidemia Mother     Diabetes Father     Hypertension Father     Arthritis Father     Sudden death Father         cardiac    Colon cancer Paternal Grandfather     Breast cancer Sister     Heart disease Brother         Coronary stents     I have reviewed and agree with the history as documented  E-Cigarette/Vaping    E-Cigarette Use Never User      E-Cigarette/Vaping Substances    Nicotine No     Flavoring No      Social History     Tobacco Use    Smoking status: Former Smoker     Packs/day: 1 00     Years: 40 00     Pack years: 40 00     Types: Cigarettes     Quit date:      Years since quittin 2    Smokeless tobacco: Never Used   Substance Use Topics    Alcohol use: Yes     Frequency: 2-4 times a month     Drinks per session: 1 or 2     Binge frequency: Never     Comment: has not drank in over a month    Drug use: No       Review of Systems   Constitutional: Positive for fatigue and fever  HENT: Negative  Eyes: Negative  Respiratory: Positive for cough  Negative for shortness of breath  Cardiovascular: Negative  Negative for chest pain, palpitations and leg swelling  Gastrointestinal: Positive for abdominal pain (Intermittently, chronically) and diarrhea  Endocrine: Negative  Genitourinary: Negative  Musculoskeletal: Negative  Skin: Negative  Allergic/Immunologic: Negative  Neurological: Negative  Hematological: Negative  Psychiatric/Behavioral: Negative  All other systems reviewed and are negative  Physical Exam  Physical Exam  Vitals signs and nursing note reviewed  Constitutional:       General: He is not in acute distress  Appearance: He is well-developed  He is not ill-appearing, toxic-appearing or diaphoretic  HENT:      Head: Normocephalic and atraumatic        Right Ear: External ear normal       Left Ear: External ear normal  Eyes:      General: No scleral icterus  Conjunctiva/sclera: Conjunctivae normal       Pupils: Pupils are equal, round, and reactive to light  Neck:      Musculoskeletal: Normal range of motion and neck supple  Vascular: No JVD  Cardiovascular:      Rate and Rhythm: Normal rate and regular rhythm  Pulses: Normal pulses  Heart sounds: Normal heart sounds  No murmur  Pulmonary:      Effort: Pulmonary effort is normal       Breath sounds: Normal breath sounds  Abdominal:      General: Bowel sounds are normal  There is no distension  Palpations: Abdomen is soft  There is no mass  Tenderness: There is no abdominal tenderness  There is no guarding or rebound  Hernia: No hernia is present  Musculoskeletal: Normal range of motion  General: No tenderness, deformity or signs of injury  Right lower leg: No edema  Left lower leg: No edema  Lymphadenopathy:      Cervical: No cervical adenopathy  Skin:     General: Skin is warm and dry  Capillary Refill: Capillary refill takes less than 2 seconds  Coloration: Skin is not jaundiced  Findings: No rash  Neurological:      General: No focal deficit present  Mental Status: He is alert and oriented to person, place, and time  Mental status is at baseline  Cranial Nerves: No cranial nerve deficit  Sensory: No sensory deficit  Motor: No weakness  Coordination: Coordination normal       Gait: Gait abnormal       Deep Tendon Reflexes: Reflexes are normal and symmetric     Psychiatric:         Mood and Affect: Mood normal          Behavior: Behavior normal          Vital Signs  ED Triage Vitals   Temperature Pulse Respirations Blood Pressure SpO2   04/01/21 0939 04/01/21 0935 04/01/21 0935 04/01/21 0935 04/01/21 0935   99 3 °F (37 4 °C) 72 20 165/74 94 %      Temp Source Heart Rate Source Patient Position - Orthostatic VS BP Location FiO2 (%)   04/01/21 0939 04/01/21 0935 04/01/21 0935 04/01/21 0935 --   Oral Monitor Sitting Left arm       Pain Score       --                  Vitals:    04/01/21 1055 04/01/21 1100 04/01/21 1115 04/01/21 1118   BP:  (!) 174/80     Pulse: 68 71 69 70   Patient Position - Orthostatic VS:             Visual Acuity  Visual Acuity      Most Recent Value   L Pupil Size (mm)  3   R Pupil Size (mm)  3          ED Medications  Medications   potassium chloride (K-DUR,KLOR-CON) CR tablet 40 mEq (40 mEq Oral Given 4/1/21 1037)       Diagnostic Studies  Results Reviewed     Procedure Component Value Units Date/Time    CBC and differential [[id number]]  (Abnormal) Collected: 04/01/21 0948    Lab Status: Final result Specimen: Blood from Arm, Right Updated: 04/01/21 1106     WBC 8 35 Thousand/uL      RBC 4 46 Million/uL      Hemoglobin 10 5 g/dL      Hematocrit 35 5 %      MCV 80 fL      MCH 23 5 pg      MCHC 29 6 g/dL      RDW 17 3 %      MPV --     Platelets --     nRBC 0 /100 WBCs      Neutrophils Relative 71 %      Immat GRANS % 1 %      Lymphocytes Relative 13 %      Monocytes Relative 12 %      Eosinophils Relative 2 %      Basophils Relative 1 %      Neutrophils Absolute 6 03 Thousands/µL      Immature Grans Absolute 0 05 Thousand/uL      Lymphocytes Absolute 1 12 Thousands/µL      Monocytes Absolute 0 96 Thousand/µL      Eosinophils Absolute 0 14 Thousand/µL      Basophils Absolute 0 05 Thousands/µL     UA w Reflex to Microscopic w Reflex to Culture [[id number]]  (Abnormal) Collected: 04/01/21 1029    Lab Status: Final result Specimen: Urine, Clean Catch Updated: 04/01/21 1053     Color, UA Yellow     Clarity, UA Clear     Specific Gravity, UA 1 010     pH, UA 7 0     Leukocytes, UA Negative     Nitrite, UA Negative     Protein, UA Negative mg/dl      Glucose, UA Negative mg/dl      Ketones, UA Negative mg/dl      Urobilinogen, UA 2 0 E U /dl      Bilirubin, UA Negative     Blood, UA Negative    Protime-INR [[id number]]  (Abnormal) Collected: 04/01/21 0948    Lab Status: Final result Specimen: Blood from Arm, Right Updated: 04/01/21 1047     Protime 14 6 seconds      INR 1 14    APTT [[id number]]  (Normal) Collected: 04/01/21 0948    Lab Status: Final result Specimen: Blood from Arm, Right Updated: 04/01/21 1047     PTT 33 seconds     Lactic acid [[id number]]  (Normal) Collected: 04/01/21 0953    Lab Status: Final result Specimen: Blood from Arm, Left Updated: 04/01/21 1022     LACTIC ACID 1 3 mmol/L     Narrative:      Result may be elevated if tourniquet was used during collection  Comprehensive metabolic panel [[id number]]  (Abnormal) Collected: 04/01/21 0948    Lab Status: Final result Specimen: Blood from Arm, Right Updated: 04/01/21 1011     Sodium 141 mmol/L      Potassium 3 0 mmol/L      Chloride 98 mmol/L      CO2 33 mmol/L      ANION GAP 10 mmol/L      BUN 14 mg/dL      Creatinine 1 12 mg/dL      Glucose 108 mg/dL      Calcium 8 8 mg/dL      Corrected Calcium 9 4 mg/dL      AST 22 U/L      ALT 20 U/L      Alkaline Phosphatase 136 U/L      Total Protein 7 3 g/dL      Albumin 3 3 g/dL      Total Bilirubin 0 90 mg/dL      eGFR 64 ml/min/1 73sq m     Narrative:      [place] guidelines for Chronic Kidney Disease (CKD):     Stage 1 with normal or high GFR (GFR > 90 mL/min/1 73 square meters)    Stage 2 Mild CKD (GFR = 60-89 mL/min/1 73 square meters)    Stage 3A Moderate CKD (GFR = 45-59 mL/min/1 73 square meters)    Stage 3B Moderate CKD (GFR = 30-44 mL/min/1 73 square meters)    Stage 4 Severe CKD (GFR = 15-29 mL/min/1 73 square meters)    Stage 5 End Stage CKD (GFR <15 mL/min/1 73 square meters)  Note: GFR calculation is accurate only with a steady state creatinine    Blood culture #1 [[id number]] Collected: 04/01/21 0953    Lab Status: In process Specimen: Blood from Arm, Left Updated: 04/01/21 0956    Procalcitonin with AM Reflex [[id number]] Collected: 04/01/21 0948    Lab Status:  In process Specimen: Blood from Arm, Right Updated: 04/01/21 0953    Blood culture #2 [[id number]] Collected: 04/01/21 0948    Lab Status: In process Specimen: Blood from Arm, Right Updated: 04/01/21 0952                 No orders to display              Procedures  ECG 12 Lead Documentation Only    Date/Time: 4/1/2021 9:42 AM  Performed by: Tonie Houston DO  Authorized by: Tonie Houston DO     ECG reviewed by me, the ED Provider: yes    Patient location:  ED  Previous ECG:     Previous ECG:  Compared to current    Comparison ECG info:  Compared to EKG September 12, 2019, normal axis has replaced left axis deviation  Similarity:  Changes noted  Rhythm:     Rhythm: sinus rhythm    Ectopy:     Ectopy: none    QRS:     QRS axis:  Normal    QRS intervals: Wide  Conduction:     Conduction: abnormal      Abnormal conduction: non-specific intraventricular conduction delay    ST segments:     ST segments:  Normal  T waves:     T waves: normal               ED Course  ED Course as of Apr 01 1206   Thu Apr 01, 2021   1008 Patient says he is comfortable  His pulse ox is 88% on room air  Heart remains at sinus rhythm at 66 beats per minute and blood pressure 143/64  Await lab results  Today's CXR from outside facility shows left sided effusion and small bilateral infiltrates  [address] for admission  1059 Upon ambulating here patient's pulse ox goes from 94% down to 91%  1112 Patient remains asymptomatic  Case discussed with admitting physician  Patient deemed safe for discharge with close follow-up  Patient is in agreement                                SBIRT 20yo+      Most Recent Value   SBIRT (22 yo +)   In order to provide better care to our patients, we are screening all of our patients for alcohol and drug use  Would it be okay to ask you these screening questions?   No Filed at: 04/01/2021 0947                    MDM  Number of Diagnoses or Management Options  Anemia: new and requires workup  Pneumonia due to COVID-19 virus: new and requires workup  Diagnosis management comments: Elderly male with new onset fever and nonproductive cough appears to have COVID pneumonia  Although with ambulation pulse ox went down to 91% and once or twice red 88% patient is asymptomatic  Blood pressures been stable has is his pulse rate  Patient 1st discharge  This was discussed with internal medicine who agrees  Patient instructed to start vitamin-C, vitamin D 3 and a multivitamin  Instructed to have somebody  pulse ox up for him so he can monitor his pulse ox  Instructed follow with his PCP and had the PCP review today's results  Amount and/or Complexity of Data Reviewed  Clinical lab tests: ordered and reviewed  Tests in the radiology section of CPT®: ordered and reviewed  Review and summarize past medical records: yes  Discuss the patient with other providers: yes  Independent visualization of images, tracings, or specimens: yes        Disposition  Final diagnoses:   Pneumonia due to COVID-19 virus   Anemia     Time reflects when diagnosis was documented in both MDM as applicable and the Disposition within this note     Time User Action Codes Description Comment    4/1/2021 11:15 AM Andrea Mireles [U07 1,  J12 82] Pneumonia due to COVID-19 virus     4/1/2021 11:15 AM Andrea Mireles [D64 9] Anemia       ED Disposition     ED Disposition Condition Date/Time Comment    Discharge Stable Thu Apr 1, 2021 11:15 AM Chad Colorado discharge to home/self care              Follow-up Information     Follow up With Specialties Details Why Obdulio Metz, DO Internal Medicine, Family Medicine Go in 1 day  [facility]  [id number] [place] [id number] [phone]            Discharge Medication List as of 4/1/2021 11:19 AM      CONTINUE these medications which have NOT CHANGED    Details   ascorbic acid (VITAMIN C) 500 mg tablet Take 1 tablet (500 mg total) by mouth 2 (two) times a day, Starting Fri 1/4/2019, Normal aspirin 81 MG tablet Take 1 tablet (81 mg total) by mouth daily, Starting Fri 3/29/2019, Historical Med      atorvastatin (LIPITOR) 40 mg tablet Take 1 tablet (40 mg total) by mouth daily, Starting Wed 9/2/2020, Normal      diphenoxylate-atropine (LOMOTIL) 2 5-0 025 mg per tablet Take 1 tablet by mouth 4 (four) times a day as needed for diarrhea, Starting Fri 3/5/2021, Normal      ferrous sulfate 324 MG TBEC Take 324 mg by mouth, Starting Fri 1/4/2019, Historical Med      furosemide (LASIX) 20 mg tablet TAKE 1 TABLET BY MOUTH TWICE A DAY, Normal      lisinopril (ZESTRIL) 20 mg tablet TAKE 1 TABLET BY MOUTH EVERY DAY, Normal      mesalamine (ROWASA) 4 g Insert 60 mL (4 g total) into the rectum daily at bedtime, Starting Fri 3/5/2021, Normal      metFORMIN (GLUCOPHAGE-XR) 500 mg 24 hr tablet TAKE 1 TABLET BY MOUTH EVERY DAY WITH DINNER, Normal      metoprolol succinate (TOPROL-XL) 100 mg 24 hr tablet Take 1 tablet (100 mg total) by mouth daily, Starting Fri 10/9/2020, Normal      Multiple Vitamin (MULTI-VITAMIN) tablet Take 1 tablet by mouth daily, Starting Fri 1/4/2019, Normal      Multiple Vitamins-Minerals (ICAPS AREDS 2) CAPS Take 1 capsule by mouth daily, Historical Med      nitroglycerin (NITROSTAT) 0 3 mg SL tablet Nitrostat 0 3 mg sublingual tablet   Place 1 tablet as needed by sublingual route as needed for 90 days  , Historical Med      omeprazole (PriLOSEC) 40 MG capsule Take 1 capsule (40 mg total) by mouth daily, Starting Wed 1/20/2021, Normal           No discharge procedures on file      PDMP Review       Value Time User    PDMP Reviewed  Yes 9/12/2019  2:51 PM Rush Stanley MD          ED Provider  Electronically Signed by           Tonie Houston DO  04/01/21 9521

## 2021-04-01 NOTE — DISCHARGE INSTRUCTIONS
You have COVID pneumonia  You also have a low red blood cell count  Please call your doctor tomorrow and have the doctor review today's chart and lab results  Have your doctor follow up on your recent CT exam   You need workup for your low hemoglobin as well as the abdominal problem  We recommend that you take vitamin D3, 2000 international units daily  Also take vitamin-C, 1 gram every 12 hours and a multivitamin daily  Have someone  your vitamins and try to get the pulse oximeter for you  If your oxygen level remains below 90% for any length of time or if you feel more short of breath you should return here  [address]    Your healthcare provider and/or public health staff have evaluated you and have determined that you do not need to remain in the hospital at this time  At this time you can be isolated at home where you will be monitored by staff from your local or state health department  You should carefully follow the prevention and isolation steps below until a healthcare provider or local or state health department says that you can return to your normal activities  Stay home except to get medical care    People who are mildly ill with COVID-19 are able to isolate at home during their illness  You should restrict activities outside your home, except for getting medical care  Do not go to work, school, or public areas  Avoid using public transportation, ride-sharing, or taxis  Separate yourself from other people and animals in your home    People: As much as possible, you should stay in a specific room and away from other people in your home  Also, you should use a separate bathroom, if available  Animals: You should restrict contact with pets and other animals while you are sick with COVID-19, just like you would around other people   Although there have not been reports of pets or other animals becoming sick with COVID-19, it is still recommended that people sick with COVID-19 limit contact with animals until more information is known about the virus  When possible, have another member of your household care for your animals while you are sick  If you are sick with COVID-19, avoid contact with your pet, including petting, snuggling, being kissed or licked, and sharing food  If you must care for your pet or be around animals while you are sick, wash your hands before and after you interact with pets and wear a facemask  See COVID-19 and Animals for more information  Call ahead before visiting your doctor    If you have a medical appointment, call the healthcare provider and tell them that you have or may have COVID-19  This will help the healthcare providers office take steps to keep other people from getting infected or exposed  Wear a facemask    You should wear a facemask when you are around other people (e g , sharing a room or vehicle) or pets and before you enter a healthcare providers office  If you are not able to wear a facemask (for example, because it causes trouble breathing), then people who live with you should not stay in the same room with you, or they should wear a facemask if they enter your room  Cover your coughs and sneezes    Cover your mouth and nose with a tissue when you cough or sneeze  Throw used tissues in a lined trash can  Immediately wash your hands with soap and water for at least 20 seconds or, if soap and water are not available, clean your hands with an alcohol-based hand  that contains at least 60% alcohol  Clean your hands often    Wash your hands often with soap and water for at least 20 seconds, especially after blowing your nose, coughing, or sneezing; going to the bathroom; and before eating or preparing food   If soap and water are not readily available, use an alcohol-based hand  with at least 60% alcohol, covering all surfaces of your hands and rubbing them together until they feel dry   Soap and water are the best option if hands are visibly dirty  Avoid touching your eyes, nose, and mouth with unwashed hands  Avoid sharing personal household items    You should not share dishes, drinking glasses, cups, eating utensils, towels, or bedding with other people or pets in your home  After using these items, they should be washed thoroughly with soap and water  Clean all high-touch surfaces everyday    High touch surfaces include counters, tabletops, doorknobs, bathroom fixtures, toilets, phones, keyboards, tablets, and bedside tables  Also, clean any surfaces that may have blood, stool, or body fluids on them  Use a household cleaning spray or wipe, according to the label instructions  Labels contain instructions for safe and effective use of the cleaning product including precautions you should take when applying the product, such as wearing gloves and making sure you have good ventilation during use of the product  Monitor your symptoms    Seek prompt medical attention if your illness is worsening (e g , difficulty breathing)  Before seeking care, call your healthcare provider and tell them that you have, or are being evaluated for, COVID-19  Put on a facemask before you enter the facility  These steps will help the healthcare providers office to keep other people in the office or waiting room from getting infected or exposed  Ask your healthcare provider to call the local or state health department  Persons who are placed under active monitoring or facilitated self-monitoring should follow instructions provided by their local health department or occupational health professionals, as appropriate  If you have a medical emergency and need to call 911, notify the dispatch personnel that you have, or are being evaluated for COVID-19  If possible, put on a facemask before emergency medical services arrive      Discontinuing home isolation    Patients with confirmed COVID-19 should remain under home isolation precautions until the following conditions are met: They have had no fever for at least 24 hours (that is one full day of no fever without the use medicine that reduces fevers)  AND  other symptoms have improved (for example, when their cough or shortness of breath have improved)  AND  If had mild or moderate illness, at least 10 days have passed since their symptoms first appeared or if severe illness (needed oxygen) or immunosuppressed, at least 20 days have passed since symptoms first appeared  Patients with confirmed COVID-19 should also notify close contacts (including their workplace) and ask that they self-quarantine  Currently, close contact is defined as being within 6 feet for 15 minutes or more from the period 24 hours starting 48 hours before symptom onset to the time at which the patient went into isolation  Close contacts of patients diagnosed with COVID-19 should be instructed by the patient to self-quarantine for 14 days from the last time of their last contact with the patient       Source: [employer]

## 2021-04-02 ENCOUNTER — TELEMEDICINE (OUTPATIENT)
Dept: FAMILY MEDICINE CLINIC | Facility: HOSPITAL | Age: 76
End: 2021-04-02
Payer: COMMERCIAL

## 2021-04-02 VITALS — BODY MASS INDEX: 24.5 KG/M2 | TEMPERATURE: 99.5 F | WEIGHT: 175 LBS | HEIGHT: 71 IN | OXYGEN SATURATION: 91 %

## 2021-04-02 DIAGNOSIS — I10 ESSENTIAL HYPERTENSION: ICD-10-CM

## 2021-04-02 DIAGNOSIS — I25.10 CORONARY ARTERY DISEASE INVOLVING NATIVE CORONARY ARTERY OF NATIVE HEART WITHOUT ANGINA PECTORIS: ICD-10-CM

## 2021-04-02 DIAGNOSIS — I48.0 PAROXYSMAL ATRIAL FIBRILLATION (HCC): ICD-10-CM

## 2021-04-02 DIAGNOSIS — I50.32 CHRONIC DIASTOLIC CONGESTIVE HEART FAILURE (HCC): ICD-10-CM

## 2021-04-02 DIAGNOSIS — U07.1 COVID-19: Primary | ICD-10-CM

## 2021-04-02 LAB
ATRIAL RATE: 72 BPM
QRS AXIS: 78 DEGREES
QRSD INTERVAL: 134 MS
QT INTERVAL: 472 MS
QTC INTERVAL: 516 MS
T WAVE AXIS: 87 DEGREES
VENTRICULAR RATE: 72 BPM

## 2021-04-02 PROCEDURE — 93010 ELECTROCARDIOGRAM REPORT: CPT | Performed by: INTERNAL MEDICINE

## 2021-04-02 PROCEDURE — 99213 OFFICE O/P EST LOW 20 MIN: CPT | Performed by: INTERNAL MEDICINE

## 2021-04-02 PROCEDURE — 1036F TOBACCO NON-USER: CPT | Performed by: INTERNAL MEDICINE

## 2021-04-02 PROCEDURE — 3008F BODY MASS INDEX DOCD: CPT | Performed by: INTERNAL MEDICINE

## 2021-04-02 RX ORDER — METOPROLOL SUCCINATE 100 MG/1
TABLET, EXTENDED RELEASE ORAL
Qty: 90 TABLET | Refills: 1 | Status: SHIPPED | OUTPATIENT
Start: 2021-04-02 | End: 2021-10-06

## 2021-04-02 NOTE — PROGRESS NOTES
COVID-19 Outpatient Progress Note    Assessment/Plan:    Problem List Items Addressed This Visit     None      Visit Diagnoses     COVID-19    -  Primary         Disposition:     I recommended continued isolation until at least 24 hours have passed since recovery defined as resolution of fever without the use of fever-reducing medications AND improvement in COVID symptoms AND 10 days have passed since onset of symptoms (or 10 days have passed since date of first positive viral diagnostic test for asymptomatic patients)  I have spent 15 minutes directly with the patient  Greater than 50% of this time was spent in counseling/coordination of care regarding: prognosis, risks and benefits of treatment options, instructions for management, patient and family education, importance of treatment compliance, risk factor reductions and impressions  He was advised to self isolate and to avoid all public places/transportation/stores  He was encouraged to wipe down surfaces/handles and to use and wash his own utensils  He was advised to check temp 2-3 x's a day and to call with any temp > 102 OR with any new/worse symptoms or with O2 sat <90% or with HR > 120         Encounter provider Carolyn Hook DO    Provider located at [address] [place] [address] [address] [place] [id number]    Recent Visits  No visits were found meeting these conditions  Showing recent visits within past 7 days and meeting all other requirements     Today's Visits  Date Type Provider Dept   04/02/21 Telemedicine Carolyn Hoko DO [facility]   Showing today's visits and meeting all other requirements     Future Appointments  No visits were found meeting these conditions  Showing future appointments within next 150 days and meeting all other requirements      This virtual check-in was done via [employer] and patient was informed that this is not a secure, HIPAA-compliant platform   He agrees to proceed  Patient agrees to participate in a virtual check in via telephone or video visit instead of presenting to the office to address urgent/immediate medical needs  Patient is aware this is a billable service  After connecting through [facility], the patient was identified by name and date of birth  Sena Leiva was informed that this was a telemedicine visit and that the exam was being conducted confidentially over secure lines  My office door was closed  No one else was in the room  Sena Leiva acknowledged consent and understanding of privacy and security of the telemedicine visit  I informed the patient that I have reviewed his record in Epic and presented the opportunity for him to ask any questions regarding the visit today  The patient agreed to participate  Subjective:   Sena Leiva is a 77 y o  male who has been screened for COVID-19  Symptom change since last report: unchanged  Patient's symptoms include fever, rhinorrhea and cough  Patient denies fatigue, congestion, sore throat, anosmia, loss of taste, shortness of breath, chest tightness, abdominal pain, nausea, vomiting, diarrhea, myalgias and headaches  Date of symptom onset: 3/29/2021  Date of positive COVID-19 PCR: 4/1/2021    Pt presented to  4/1/21 with 3 days of F/fatigue/cough and diarrhea  Symptoms started 2 hrs after 2nd COVID vaccine  Pt had rapid COVID test at [facility] and was +  CXR showed RLL pneumonia and small L pleural effusion  Pulse Ox reportedly dropped below 90% with ambulation at [facility] and pt was transferred to [address] [place] [address] ED on 4/1/21  In the ED his BP was 165/74, temp 99 3, and O2 sat 94%  O2 sat did drop to 88% on RA but quickly rebounded  O2 sat 94-91% with ambulation in ED  BW showed anemia with H/H 10 5/35 5 with MCV 80 and RDW up at 17 3 with a decrease in lymphocytes  PT/PTT/lactate and procalcitonin were normal   BC neg x 2 thus far  K low at 3 0 and Alk Phos up at 135, alb low at 3 3    ECG documented as nonspecific intraventricular conduction delay  Admission was d/w SLIM and pt was deemed safe for discharge with close follow up  Still having a fever today with 100 2  Cough is rare and he notes no SOB with ambulation or at rest  He has a runny nose and intermittent loss of taste but those are not new since he had radiation        No results found for: Corry Simi, MICHELLE, Laurita [address]  Past Medical History:   Diagnosis Date    Arthritis     Atrial fibrillation ([facility] )     Cataract     Colitis     Colon cancer ([facility] )     Diabetes mellitus type II, non insulin dependent ([facility] )     Fatty liver     History of chemotherapy     History of radiation therapy     History of shingles 2018    Hypertension     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 )     Pneumonia     Skin cancer      Past Surgical History:   Procedure Laterality Date    AORTIC VALVE REPLACEMENT      COLECTOMY      COLONOSCOPY  11/2019    Prior right hemicolectomy    CORONARY ARTERY BYPASS GRAFT      DENTAL SURGERY      KNEE SURGERY      LYMPHADENECTOMY      OTHER SURGICAL HISTORY      MAZE PROCEDURE    [place] TOTAL KNEE ARTHROPLASTY Left 1/28/2019    Procedure: ARTHROPLASTY LEFT KNEE TOTAL;  Surgeon: Herve Dempsey MD;  [facility];  Service: Orthopedics    SKIN BIOPSY      UPPER GASTROINTESTINAL ENDOSCOPY  11/2019    Cricket ring     Current Outpatient Medications   Medication Sig Dispense Refill    ascorbic acid (VITAMIN C) 500 mg tablet Take 1 tablet (500 mg total) by mouth 2 (two) times a day (Patient not taking: Reported on 1/20/2021) 60 tablet 1    aspirin 81 MG tablet Take 1 tablet (81 mg total) by mouth daily      atorvastatin (LIPITOR) 40 mg tablet Take 1 tablet (40 mg total) by mouth daily 90 tablet 3    diphenoxylate-atropine (LOMOTIL) 2 5-0 025 mg per tablet Take 1 tablet by mouth 4 (four) times a day as needed for diarrhea 30 tablet 1    ferrous sulfate 324 MG TBEC Take 324 mg by mouth      furosemide (LASIX) 20 mg tablet TAKE 1 TABLET BY MOUTH TWICE A  tablet 1    lisinopril (ZESTRIL) 20 mg tablet TAKE 1 TABLET BY MOUTH EVERY DAY 90 tablet 1    mesalamine (ROWASA) 4 g Insert 60 mL (4 g total) into the rectum daily at bedtime 30 Bottle 1    metFORMIN (GLUCOPHAGE-XR) 500 mg 24 hr tablet TAKE 1 TABLET BY MOUTH EVERY DAY WITH DINNER 90 tablet 2    metoprolol succinate (TOPROL-XL) 100 mg 24 hr tablet Take 1 tablet (100 mg total) by mouth daily 90 tablet 1    Multiple Vitamin (MULTI-VITAMIN) tablet Take 1 tablet by mouth daily (Patient not taking: Reported on 1/20/2021) 30 tablet 1    Multiple Vitamins-Minerals (ICAPS AREDS 2) CAPS Take 1 capsule by mouth daily      nitroglycerin (NITROSTAT) 0 3 mg SL tablet Nitrostat 0 3 mg sublingual tablet   Place 1 tablet as needed by sublingual route as needed for 90 days   omeprazole (PriLOSEC) 40 MG capsule Take 1 capsule (40 mg total) by mouth daily 90 capsule 3     No current facility-administered medications for this visit  Allergies   Allergen Reactions    Ceftin [Cefuroxime] Itching     However, has tolerated Cefazolin and Pip-Tazo since, which have different side chains  Be cautious with / avoid 2nd, 3rd, or 4th Gen Cephs that have similar side chains to Cefuroxime  Review of Systems   Constitutional: Positive for appetite change and fever  Negative for fatigue  HENT: Positive for rhinorrhea  Negative for congestion and sore throat  Respiratory: Positive for cough  Negative for chest tightness and shortness of breath  Gastrointestinal: Negative for abdominal pain, diarrhea, nausea and vomiting  Musculoskeletal: Negative for myalgias  Neurological: Negative for headaches  Objective:    Vitals:    04/02/21 0751 04/02/21 0801   Temp: 99 5 °F (37 5 °C)    SpO2:  91%   Weight: 79 4 kg (175 lb)    Height: 5' 11" (1 803 m)        Physical Exam  Vitals signs and nursing note reviewed     Constitutional:       General: He is not in acute distress  Appearance: He is not ill-appearing  HENT:      Head: Normocephalic and atraumatic  Eyes:      General:         Right eye: No discharge  Left eye: No discharge  Conjunctiva/sclera: Conjunctivae normal    Pulmonary:      Effort: Pulmonary effort is normal  No respiratory distress  Skin:     Coloration: Skin is not pale  Findings: No rash  Psychiatric:         Mood and Affect: Mood normal          Behavior: Behavior normal          Thought Content: Thought content normal          Judgment: Judgment normal        VIRTUAL VISIT [facility] acknowledges that he has consented to an online visit or consultation  He understands that the online visit is based solely on information provided by him, and that, in the absence of a face-to-face physical evaluation by the physician, the diagnosis he receives is both limited and provisional in terms of accuracy and completeness  This is not intended to replace a full medical face-to-face evaluation by the physician  Mike Reyes understands and accepts these terms

## 2021-04-06 ENCOUNTER — APPOINTMENT (EMERGENCY)
Dept: RADIOLOGY | Facility: HOSPITAL | Age: 76
DRG: 177 | End: 2021-04-06
Attending: EMERGENCY MEDICINE
Payer: COMMERCIAL

## 2021-04-06 ENCOUNTER — HOSPITAL ENCOUNTER (INPATIENT)
Facility: HOSPITAL | Age: 76
LOS: 4 days | Discharge: HOME | DRG: 177 | End: 2021-04-10
Attending: EMERGENCY MEDICINE | Admitting: INTERNAL MEDICINE
Payer: COMMERCIAL

## 2021-04-06 DIAGNOSIS — U07.1 COVID-19: Primary | ICD-10-CM

## 2021-04-06 DIAGNOSIS — R06.00 DYSPNEA: ICD-10-CM

## 2021-04-06 DIAGNOSIS — I49.3 PVC (PREMATURE VENTRICULAR CONTRACTION): ICD-10-CM

## 2021-04-06 DIAGNOSIS — E87.6 HYPOKALEMIA: ICD-10-CM

## 2021-04-06 DIAGNOSIS — C18.0 MALIGNANT TUMOR OF CECUM (HCC): ICD-10-CM

## 2021-04-06 LAB
ALBUMIN SERPL BCP-MCNC: 2.9 G/DL (ref 3.5–5)
ALP SERPL-CCNC: 126 U/L (ref 46–116)
ALT SERPL W P-5'-P-CCNC: 26 U/L (ref 12–78)
ANION GAP SERPL CALCULATED.3IONS-SCNC: 12 MMOL/L (ref 4–13)
AST SERPL W P-5'-P-CCNC: 54 U/L (ref 5–45)
ATRIAL RATE: 62 BPM
BACTERIA BLD CULT: NORMAL
BACTERIA BLD CULT: NORMAL
BASOPHILS # BLD AUTO: 0.01 THOUSANDS/ΜL (ref 0–0.1)
BASOPHILS NFR BLD AUTO: 0 % (ref 0–1)
BILIRUB SERPL-MCNC: 0.9 MG/DL (ref 0.2–1)
BILIRUB UR QL STRIP: NEGATIVE
BUN SERPL-MCNC: 12 MG/DL (ref 5–25)
CA-I BLD-SCNC: 0.99 MMOL/L (ref 1.12–1.32)
CALCIUM ALBUM COR SERPL-MCNC: 9.1 MG/DL (ref 8.3–10.1)
CALCIUM SERPL-MCNC: 8.2 MG/DL (ref 8.3–10.1)
CHLORIDE SERPL-SCNC: 99 MMOL/L (ref 100–108)
CK MB SERPL-MCNC: 1.9 NG/ML (ref 0–5)
CK MB SERPL-MCNC: <1 % (ref 0–2.5)
CK SERPL-CCNC: 341 U/L (ref 39–308)
CLARITY UR: CLEAR
CO2 SERPL-SCNC: 30 MMOL/L (ref 21–32)
COLOR UR: YELLOW
CREAT SERPL-MCNC: 1.26 MG/DL (ref 0.6–1.3)
CRP SERPL QL: 78.3 MG/L
D DIMER PPP FEU-MCNC: 2.19 UG/ML FEU
EOSINOPHIL # BLD AUTO: 0.2 THOUSAND/ΜL (ref 0–0.61)
EOSINOPHIL NFR BLD AUTO: 2 % (ref 0–6)
ERYTHROCYTE [DISTWIDTH] IN BLOOD BY AUTOMATED COUNT: 17.7 % (ref 11.6–15.1)
FERRITIN SERPL-MCNC: 385 NG/ML (ref 8–388)
GFR SERPL CREATININE-BSD FRML MDRD: 56 ML/MIN/1.73SQ M
GLUCOSE SERPL-MCNC: 107 MG/DL (ref 65–140)
GLUCOSE SERPL-MCNC: 167 MG/DL (ref 65–140)
GLUCOSE UR STRIP-MCNC: NEGATIVE MG/DL
HCT VFR BLD AUTO: 34.4 % (ref 36.5–49.3)
HGB BLD-MCNC: 10.4 G/DL (ref 12–17)
HGB UR QL STRIP.AUTO: NEGATIVE
IMM GRANULOCYTES # BLD AUTO: 0.09 THOUSAND/UL (ref 0–0.2)
IMM GRANULOCYTES NFR BLD AUTO: 1 % (ref 0–2)
INR PPP: 1.12 (ref 0.84–1.19)
KETONES UR STRIP-MCNC: NEGATIVE MG/DL
LACTATE SERPL-SCNC: 1.6 MMOL/L (ref 0.5–2)
LEUKOCYTE ESTERASE UR QL STRIP: NEGATIVE
LYMPHOCYTES # BLD AUTO: 1.23 THOUSANDS/ΜL (ref 0.6–4.47)
LYMPHOCYTES NFR BLD AUTO: 11 % (ref 14–44)
MAGNESIUM SERPL-MCNC: 1.8 MG/DL (ref 1.6–2.6)
MAGNESIUM SERPL-MCNC: 1.9 MG/DL (ref 1.6–2.6)
MCH RBC QN AUTO: 23.9 PG (ref 26.8–34.3)
MCHC RBC AUTO-ENTMCNC: 30.2 G/DL (ref 31.4–37.4)
MCV RBC AUTO: 79 FL (ref 82–98)
MONOCYTES # BLD AUTO: 0.5 THOUSAND/ΜL (ref 0.17–1.22)
MONOCYTES NFR BLD AUTO: 4 % (ref 4–12)
NEUTROPHILS # BLD AUTO: 9.32 THOUSANDS/ΜL (ref 1.85–7.62)
NEUTS SEG NFR BLD AUTO: 82 % (ref 43–75)
NITRITE UR QL STRIP: NEGATIVE
NRBC BLD AUTO-RTO: 0 /100 WBCS
NT-PROBNP SERPL-MCNC: 7740 PG/ML
PH UR STRIP.AUTO: 6.5 [PH]
PLATELET # BLD AUTO: 183 THOUSANDS/UL (ref 149–390)
POTASSIUM SERPL-SCNC: 2.9 MMOL/L (ref 3.5–5.3)
POTASSIUM SERPL-SCNC: 3.4 MMOL/L (ref 3.5–5.3)
PR INTERVAL: 208 MS
PROCALCITONIN SERPL-MCNC: 0.34 NG/ML
PROT SERPL-MCNC: 6.9 G/DL (ref 6.4–8.2)
PROT UR STRIP-MCNC: NEGATIVE MG/DL
PROTHROMBIN TIME: 14.5 SECONDS (ref 11.6–14.5)
QRS AXIS: -57 DEGREES
QRSD INTERVAL: 136 MS
QT INTERVAL: 520 MS
QTC INTERVAL: 527 MS
RBC # BLD AUTO: 4.36 MILLION/UL (ref 3.88–5.62)
SODIUM SERPL-SCNC: 141 MMOL/L (ref 136–145)
SP GR UR STRIP.AUTO: 1.01 (ref 1–1.03)
T WAVE AXIS: 100 DEGREES
TRIGL SERPL-MCNC: 93 MG/DL
TROPONIN I SERPL-MCNC: 0.04 NG/ML
TROPONIN I SERPL-MCNC: 0.04 NG/ML
TROPONIN I SERPL-MCNC: 0.05 NG/ML
TROPONIN I SERPL-MCNC: 0.05 NG/ML
TSH SERPL DL<=0.05 MIU/L-ACNC: 10.6 UIU/ML (ref 0.36–3.74)
UROBILINOGEN UR QL STRIP.AUTO: 1 E.U./DL
VENTRICULAR RATE: 62 BPM
WBC # BLD AUTO: 11.35 THOUSAND/UL (ref 4.31–10.16)

## 2021-04-06 PROCEDURE — 83735 ASSAY OF MAGNESIUM: CPT | Performed by: EMERGENCY MEDICINE

## 2021-04-06 PROCEDURE — 99222 1ST HOSP IP/OBS MODERATE 55: CPT | Performed by: INTERNAL MEDICINE

## 2021-04-06 PROCEDURE — 83605 ASSAY OF LACTIC ACID: CPT | Performed by: EMERGENCY MEDICINE

## 2021-04-06 PROCEDURE — 99285 EMERGENCY DEPT VISIT HI MDM: CPT

## 2021-04-06 PROCEDURE — 85025 COMPLETE CBC W/AUTO DIFF WBC: CPT | Performed by: EMERGENCY MEDICINE

## 2021-04-06 PROCEDURE — 84478 ASSAY OF TRIGLYCERIDES: CPT | Performed by: EMERGENCY MEDICINE

## 2021-04-06 PROCEDURE — 80053 COMPREHEN METABOLIC PANEL: CPT | Performed by: EMERGENCY MEDICINE

## 2021-04-06 PROCEDURE — 81003 URINALYSIS AUTO W/O SCOPE: CPT | Performed by: INTERNAL MEDICINE

## 2021-04-06 PROCEDURE — 96360 HYDRATION IV INFUSION INIT: CPT

## 2021-04-06 PROCEDURE — 84145 PROCALCITONIN (PCT): CPT | Performed by: EMERGENCY MEDICINE

## 2021-04-06 PROCEDURE — 84484 ASSAY OF TROPONIN QUANT: CPT | Performed by: INTERNAL MEDICINE

## 2021-04-06 PROCEDURE — 99285 EMERGENCY DEPT VISIT HI MDM: CPT | Performed by: EMERGENCY MEDICINE

## 2021-04-06 PROCEDURE — 82553 CREATINE MB FRACTION: CPT | Performed by: EMERGENCY MEDICINE

## 2021-04-06 PROCEDURE — 85379 FIBRIN DEGRADATION QUANT: CPT | Performed by: EMERGENCY MEDICINE

## 2021-04-06 PROCEDURE — 83880 ASSAY OF NATRIURETIC PEPTIDE: CPT | Performed by: EMERGENCY MEDICINE

## 2021-04-06 PROCEDURE — 93010 ELECTROCARDIOGRAM REPORT: CPT | Performed by: INTERNAL MEDICINE

## 2021-04-06 PROCEDURE — 82728 ASSAY OF FERRITIN: CPT | Performed by: EMERGENCY MEDICINE

## 2021-04-06 PROCEDURE — 87040 BLOOD CULTURE FOR BACTERIA: CPT | Performed by: EMERGENCY MEDICINE

## 2021-04-06 PROCEDURE — 83735 ASSAY OF MAGNESIUM: CPT | Performed by: INTERNAL MEDICINE

## 2021-04-06 PROCEDURE — 85610 PROTHROMBIN TIME: CPT | Performed by: EMERGENCY MEDICINE

## 2021-04-06 PROCEDURE — 96361 HYDRATE IV INFUSION ADD-ON: CPT

## 2021-04-06 PROCEDURE — 84484 ASSAY OF TROPONIN QUANT: CPT | Performed by: EMERGENCY MEDICINE

## 2021-04-06 PROCEDURE — 84443 ASSAY THYROID STIM HORMONE: CPT | Performed by: INTERNAL MEDICINE

## 2021-04-06 PROCEDURE — 84132 ASSAY OF SERUM POTASSIUM: CPT | Performed by: INTERNAL MEDICINE

## 2021-04-06 PROCEDURE — 82330 ASSAY OF CALCIUM: CPT | Performed by: EMERGENCY MEDICINE

## 2021-04-06 PROCEDURE — 71045 X-RAY EXAM CHEST 1 VIEW: CPT

## 2021-04-06 PROCEDURE — XW033E5 INTRODUCTION OF REMDESIVIR ANTI-INFECTIVE INTO PERIPHERAL VEIN, PERCUTANEOUS APPROACH, NEW TECHNOLOGY GROUP 5: ICD-10-PCS | Performed by: INTERNAL MEDICINE

## 2021-04-06 PROCEDURE — 82550 ASSAY OF CK (CPK): CPT | Performed by: EMERGENCY MEDICINE

## 2021-04-06 PROCEDURE — 82948 REAGENT STRIP/BLOOD GLUCOSE: CPT

## 2021-04-06 PROCEDURE — 86140 C-REACTIVE PROTEIN: CPT | Performed by: EMERGENCY MEDICINE

## 2021-04-06 PROCEDURE — 36415 COLL VENOUS BLD VENIPUNCTURE: CPT | Performed by: EMERGENCY MEDICINE

## 2021-04-06 PROCEDURE — 93005 ELECTROCARDIOGRAM TRACING: CPT

## 2021-04-06 RX ORDER — MULTIVITAMIN/IRON/FOLIC ACID 18MG-0.4MG
1 TABLET ORAL DAILY
Status: DISCONTINUED | OUTPATIENT
Start: 2021-04-13 | End: 2021-04-10 | Stop reason: HOSPADM

## 2021-04-06 RX ORDER — ATORVASTATIN CALCIUM 40 MG/1
40 TABLET, FILM COATED ORAL DAILY
Status: DISCONTINUED | OUTPATIENT
Start: 2021-04-06 | End: 2021-04-10 | Stop reason: HOSPADM

## 2021-04-06 RX ORDER — DEXAMETHASONE SODIUM PHOSPHATE 4 MG/ML
6 INJECTION, SOLUTION INTRA-ARTICULAR; INTRALESIONAL; INTRAMUSCULAR; INTRAVENOUS; SOFT TISSUE EVERY 24 HOURS
Status: DISCONTINUED | OUTPATIENT
Start: 2021-04-07 | End: 2021-04-10 | Stop reason: HOSPADM

## 2021-04-06 RX ORDER — ZINC SULFATE 50(220)MG
220 CAPSULE ORAL DAILY
Status: DISCONTINUED | OUTPATIENT
Start: 2021-04-06 | End: 2021-04-10 | Stop reason: HOSPADM

## 2021-04-06 RX ORDER — NITROGLYCERIN 0.6 MG/1
0.3 TABLET SUBLINGUAL
Status: DISCONTINUED | OUTPATIENT
Start: 2021-04-06 | End: 2021-04-10 | Stop reason: HOSPADM

## 2021-04-06 RX ORDER — DIPHENOXYLATE HYDROCHLORIDE AND ATROPINE SULFATE 2.5; .025 MG/1; MG/1
1 TABLET ORAL 4 TIMES DAILY PRN
Status: DISCONTINUED | OUTPATIENT
Start: 2021-04-06 | End: 2021-04-10 | Stop reason: HOSPADM

## 2021-04-06 RX ORDER — POTASSIUM CHLORIDE 20 MEQ/1
40 TABLET, EXTENDED RELEASE ORAL ONCE
Status: COMPLETED | OUTPATIENT
Start: 2021-04-06 | End: 2021-04-06

## 2021-04-06 RX ORDER — DEXAMETHASONE SODIUM PHOSPHATE 4 MG/ML
6 INJECTION, SOLUTION INTRA-ARTICULAR; INTRALESIONAL; INTRAMUSCULAR; INTRAVENOUS; SOFT TISSUE ONCE
Status: COMPLETED | OUTPATIENT
Start: 2021-04-06 | End: 2021-04-06

## 2021-04-06 RX ORDER — METOPROLOL SUCCINATE 50 MG/1
100 TABLET, EXTENDED RELEASE ORAL DAILY
Status: DISCONTINUED | OUTPATIENT
Start: 2021-04-06 | End: 2021-04-10 | Stop reason: HOSPADM

## 2021-04-06 RX ORDER — MAGNESIUM SULFATE 1 G/100ML
1 INJECTION INTRAVENOUS ONCE
Status: COMPLETED | OUTPATIENT
Start: 2021-04-06 | End: 2021-04-06

## 2021-04-06 RX ORDER — ASCORBIC ACID 500 MG
500 TABLET ORAL 2 TIMES DAILY
Status: DISCONTINUED | OUTPATIENT
Start: 2021-04-06 | End: 2021-04-06 | Stop reason: SDUPTHER

## 2021-04-06 RX ORDER — FERROUS SULFATE 325(65) MG
325 TABLET ORAL
Status: DISCONTINUED | OUTPATIENT
Start: 2021-04-07 | End: 2021-04-10 | Stop reason: HOSPADM

## 2021-04-06 RX ORDER — PANTOPRAZOLE SODIUM 40 MG/1
40 TABLET, DELAYED RELEASE ORAL
Status: DISCONTINUED | OUTPATIENT
Start: 2021-04-07 | End: 2021-04-10 | Stop reason: HOSPADM

## 2021-04-06 RX ORDER — FUROSEMIDE 20 MG/1
20 TABLET ORAL 2 TIMES DAILY
Status: DISCONTINUED | OUTPATIENT
Start: 2021-04-06 | End: 2021-04-10 | Stop reason: HOSPADM

## 2021-04-06 RX ORDER — LISINOPRIL 20 MG/1
20 TABLET ORAL DAILY
Status: DISCONTINUED | OUTPATIENT
Start: 2021-04-06 | End: 2021-04-10 | Stop reason: HOSPADM

## 2021-04-06 RX ORDER — ASCORBIC ACID 500 MG
1000 TABLET ORAL EVERY 12 HOURS SCHEDULED
Status: DISCONTINUED | OUTPATIENT
Start: 2021-04-06 | End: 2021-04-10 | Stop reason: HOSPADM

## 2021-04-06 RX ORDER — MELATONIN
2000 DAILY
Status: DISCONTINUED | OUTPATIENT
Start: 2021-04-06 | End: 2021-04-10 | Stop reason: HOSPADM

## 2021-04-06 RX ORDER — MESALAMINE 4 G/60ML
4 ENEMA RECTAL
Status: DISCONTINUED | OUTPATIENT
Start: 2021-04-06 | End: 2021-04-10 | Stop reason: HOSPADM

## 2021-04-06 RX ORDER — ASPIRIN 81 MG/1
81 TABLET, CHEWABLE ORAL DAILY
Status: DISCONTINUED | OUTPATIENT
Start: 2021-04-06 | End: 2021-04-10 | Stop reason: HOSPADM

## 2021-04-06 RX ORDER — MULTIVITAMIN/IRON/FOLIC ACID 18MG-0.4MG
1 TABLET ORAL DAILY
Status: DISCONTINUED | OUTPATIENT
Start: 2021-04-06 | End: 2021-04-06 | Stop reason: SDUPTHER

## 2021-04-06 RX ADMIN — POTASSIUM CHLORIDE 40 MEQ: 1500 TABLET, EXTENDED RELEASE ORAL at 08:43

## 2021-04-06 RX ADMIN — POTASSIUM CHLORIDE 40 MEQ: 1500 TABLET, EXTENDED RELEASE ORAL at 21:29

## 2021-04-06 RX ADMIN — INSULIN LISPRO 1 UNITS: 100 INJECTION, SOLUTION INTRAVENOUS; SUBCUTANEOUS at 22:59

## 2021-04-06 RX ADMIN — FAMOTIDINE 20 MG: 10 INJECTION INTRAVENOUS at 08:43

## 2021-04-06 RX ADMIN — ENOXAPARIN SODIUM 40 MG: 40 INJECTION SUBCUTANEOUS at 11:40

## 2021-04-06 RX ADMIN — OXYCODONE HYDROCHLORIDE AND ACETAMINOPHEN 1000 MG: 500 TABLET ORAL at 21:29

## 2021-04-06 RX ADMIN — ASPIRIN 81 MG: 81 TABLET, CHEWABLE ORAL at 16:14

## 2021-04-06 RX ADMIN — FUROSEMIDE 20 MG: 20 TABLET ORAL at 19:53

## 2021-04-06 RX ADMIN — DEXAMETHASONE SODIUM PHOSPHATE 6 MG: 4 INJECTION, SOLUTION INTRA-ARTICULAR; INTRALESIONAL; INTRAMUSCULAR; INTRAVENOUS; SOFT TISSUE at 08:43

## 2021-04-06 RX ADMIN — Medication 2000 UNITS: at 11:40

## 2021-04-06 RX ADMIN — OXYCODONE HYDROCHLORIDE AND ACETAMINOPHEN 1000 MG: 500 TABLET ORAL at 11:40

## 2021-04-06 RX ADMIN — REMDESIVIR 200 MG: 100 INJECTION, POWDER, LYOPHILIZED, FOR SOLUTION INTRAVENOUS at 11:40

## 2021-04-06 RX ADMIN — SODIUM CHLORIDE 500 ML: 0.9 INJECTION, SOLUTION INTRAVENOUS at 06:49

## 2021-04-06 RX ADMIN — ZINC SULFATE 220 MG (50 MG) CAPSULE 220 MG: CAPSULE at 11:40

## 2021-04-06 RX ADMIN — MAGNESIUM SULFATE HEPTAHYDRATE 1 G: 1 INJECTION, SOLUTION INTRAVENOUS at 21:30

## 2021-04-06 NOTE — ED PROVIDER NOTES
History  Chief Complaint   Patient presents with    Shortness of Breath     pt tested positive for covid and has become increasingly SOB     78 yo M with PMH of afib, colon ca, dm2, htn presents to ED with dyspnea  Recent covid diagnosis (4/1) was seen in ED at that time and d/c home  Was doing ok until the last day or so, worsening dyspnea at home  Checked his pulse ox at home this morning was high 70s  No CP  No abd pain, N/V or diarrhea  No appetite, trying to get in enough fluids  No urinary sx  No rashes  No traumas/falls  Not currently on chemotherapy, oncologist is at [facility]  History provided by:  Patient and medical records   used: No    Shortness of Breath  Severity:  Moderate  Onset quality:  Gradual  Timing:  Constant  Progression:  Worsening  Chronicity:  New  Associated symptoms: cough    Associated symptoms: no abdominal pain, no chest pain, no diaphoresis, no ear pain, no fever, no headaches, no neck pain, no rash, no sore throat, no syncope and no vomiting    Risk factors: hx of cancer    Risk factors: no prolonged immobilization        Prior to Admission Medications   Prescriptions Last Dose Informant Patient Reported? Taking?    Multiple Vitamin (MULTI-VITAMIN) tablet  Self No No   Sig: Take 1 tablet by mouth daily   Patient not taking: Reported on 1/20/2021   Multiple Vitamins-Minerals (ICAPS AREDS 2) CAPS  Self Yes No   Sig: Take 1 capsule by mouth daily   ascorbic acid (VITAMIN C) 500 mg tablet  Self No No   Sig: Take 1 tablet (500 mg total) by mouth 2 (two) times a day   Patient not taking: Reported on 1/20/2021   aspirin 81 MG tablet  Self Yes No   Sig: Take 1 tablet (81 mg total) by mouth daily   atorvastatin (LIPITOR) 40 mg tablet  Self No No   Sig: Take 1 tablet (40 mg total) by mouth daily   diphenoxylate-atropine (LOMOTIL) 2 5-0 025 mg per tablet   No No   Sig: Take 1 tablet by mouth 4 (four) times a day as needed for diarrhea   ferrous sulfate 324 MG TBEC Self Yes No   Sig: Take 324 mg by mouth   furosemide (LASIX) 20 mg tablet  Self No No   Sig: TAKE 1 TABLET BY MOUTH TWICE A DAY   lisinopril (ZESTRIL) 20 mg tablet  Self No No   Sig: TAKE 1 TABLET BY MOUTH EVERY DAY   mesalamine (ROWASA) 4 g   No No   Sig: Insert 60 mL (4 g total) into the rectum daily at bedtime   metFORMIN (GLUCOPHAGE-XR) 500 mg 24 hr tablet   No No   Sig: TAKE 1 TABLET BY MOUTH EVERY DAY WITH DINNER   metoprolol succinate (TOPROL-XL) 100 mg 24 hr tablet   No No   Sig: TAKE 1 TABLET BY MOUTH EVERY DAY   nitroglycerin (NITROSTAT) 0 3 mg SL tablet  Self Yes No   Sig: Nitrostat 0 3 mg sublingual tablet   Place 1 tablet as needed by sublingual route as needed for 90 days     omeprazole (PriLOSEC) 40 MG capsule  Self No No   Sig: Take 1 capsule (40 mg total) by mouth daily      Facility-Administered Medications: None       Past Medical History:   Diagnosis Date    Arthritis     Atrial fibrillation (HCC)     Cataract     Colitis     Colon cancer (HCC)     Diabetes mellitus type II, non insulin dependent ([facility] [address] [facility]     Fatty liver     History of chemotherapy     History of radiation therapy     History of shingles 2018    Hypertension     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[facility] [address] [facility]     Pneumonia     Skin cancer        Past Surgical History:   Procedure Laterality Date    AORTIC VALVE REPLACEMENT      COLECTOMY      COLONOSCOPY  11/2019    Prior right hemicolectomy    CORONARY ARTERY BYPASS GRAFT      DENTAL SURGERY      KNEE SURGERY      LYMPHADENECTOMY      OTHER SURGICAL HISTORY      MAZE PROCEDURE    UT TOTAL KNEE ARTHROPLASTY Left 1/28/2019    Procedure: ARTHROPLASTY LEFT KNEE TOTAL;  Surgeon: Shama Villalba MD;  Location: [facility];  Service: Orthopedics    SKIN BIOPSY      UPPER GASTROINTESTINAL ENDOSCOPY  11/2019    [place] gu       Family History   Problem Relation Age of Onset    Heart block Family     Coronary artery disease Mother     Thrombosis Mother     Hypertension Mother     Arthritis Mother     Hyperlipidemia Mother     Diabetes Father     Hypertension Father     Arthritis Father     Sudden death Father         cardiac    Colon cancer Paternal Grandfather     Breast cancer Sister     Heart disease Brother         Coronary stents     I have reviewed and agree with the history as documented  E-Cigarette/Vaping    E-Cigarette Use Never User      E-Cigarette/Vaping Substances    Nicotine No     THC No     CBD No     Flavoring No     Other No     Unknown No      Social History     Tobacco Use    Smoking status: Former Smoker     Packs/day: 1 00     Years: 40 00     Pack years: 40 00     Types: Cigarettes     Quit date:      Years since quittin 2    Smokeless tobacco: Never Used   Substance Use Topics    Alcohol use: Yes     Frequency: 2-4 times a month     Drinks per session: 1 or 2     Binge frequency: Never     Comment: has not drank in over a month    Drug use: No       Review of Systems   Constitutional: Positive for appetite change, chills and fatigue  Negative for diaphoresis, fever and unexpected weight change  HENT: Negative for congestion, ear pain, rhinorrhea, sore throat, trouble swallowing and voice change  Eyes: Negative for pain and visual disturbance  Respiratory: Positive for cough and shortness of breath  Negative for chest tightness  Cardiovascular: Negative for chest pain, palpitations, leg swelling and syncope  Gastrointestinal: Negative for abdominal pain, blood in stool, constipation, diarrhea, nausea and vomiting  Genitourinary: Negative for difficulty urinating and hematuria  Musculoskeletal: Positive for myalgias  Negative for arthralgias, back pain and neck pain  Skin: Negative for rash  Neurological: Negative for dizziness, syncope, facial asymmetry, light-headedness, numbness and headaches  Psychiatric/Behavioral: Negative for confusion and suicidal ideas  The patient is not nervous/anxious          Physical Exam  Physical Exam  Vitals signs and nursing note reviewed  Constitutional:       General: He is not in acute distress  Appearance: He is well-developed  He is not diaphoretic  HENT:      Head: Normocephalic and atraumatic  Right Ear: External ear normal       Left Ear: External ear normal       Nose: Nose normal    Eyes:      General: No scleral icterus  Right eye: No discharge  Left eye: No discharge  Conjunctiva/sclera: Conjunctivae normal       Pupils: Pupils are equal, round, and reactive to light  Neck:      Musculoskeletal: Normal range of motion and neck supple  Vascular: No JVD  Trachea: No tracheal deviation  Cardiovascular:      Rate and Rhythm: Normal rate and regular rhythm  Heart sounds: Normal heart sounds  No murmur  No friction rub  No gallop  Pulmonary:      Effort: Pulmonary effort is normal  No respiratory distress  Breath sounds: No stridor  Rales present  No wheezing  Chest:      Chest wall: No tenderness  Abdominal:      General: Bowel sounds are normal  There is no distension  Palpations: Abdomen is soft  Tenderness: There is no abdominal tenderness  There is no guarding or rebound  Musculoskeletal: Normal range of motion  General: No tenderness or deformity  Lymphadenopathy:      Cervical: No cervical adenopathy  Skin:     General: Skin is warm and dry  Coloration: Skin is pale  Findings: No rash  Neurological:      Mental Status: He is alert and oriented to person, place, and time  Cranial Nerves: No cranial nerve deficit  Sensory: No sensory deficit        Coordination: Coordination normal    Psychiatric:         Behavior: Behavior normal          Vital Signs  ED Triage Vitals [04/06/21 0631]   Temperature Pulse Respirations Blood Pressure SpO2   98 °F (36 7 °C) 77 20 (!) 171/72 (!) 88 %      Temp Source Heart Rate Source Patient Position - Orthostatic VS BP Location FiO2 (%) Temporal Monitor Lying Right arm --      Pain Score       --           Vitals:    04/06/21 0631   BP: (!) 171/72   Pulse: 77   Patient Position - Orthostatic VS: Lying         Visual Acuity      ED Medications  Medications   sodium chloride 0 9 % bolus 500 mL (has no administration in time range)       Diagnostic Studies  Results Reviewed     Procedure Component Value Units Date/Time    CBC and differential [[id number]] Collected: 04/06/21 0645    Lab Status: No result Specimen: Blood from Arm, Right     Comprehensive metabolic panel [[id number]] Collected: 04/06/21 0645    Lab Status: No result Specimen: Blood from Arm, Right     Lactic acid, plasma [[id number]] Collected: 04/06/21 0645    Lab Status: No result Specimen: Blood from Arm, Right     Blood culture #1 [[id number]] Collected: 04/06/21 0645    Lab Status: No result Specimen: Blood from Arm, Left     Blood culture #2 [[id number]] Collected: 04/06/21 0645    Lab Status: No result Specimen: Blood from Arm, Right     Magnesium [[id number]] Collected: 04/06/21 0645    Lab Status: No result Specimen: Blood from Arm, Right     Calcium, ionized [[id number]] Collected: 04/06/21 0645    Lab Status: No result Specimen: Blood from Arm, Right     C-reactive protein [[id number]] Collected: 04/06/21 0645    Lab Status: No result Specimen: Blood from Arm, Right     Ferritin [[id number]] Collected: 04/06/21 0645    Lab Status: No result Specimen: Blood from Arm, Right     NT-BNP PRO [[id number]] Collected: 04/06/21 0645    Lab Status: No result Specimen: Blood from Arm, Right     Troponin I [[id number]] Collected: 04/06/21 0645    Lab Status: No result Specimen: Blood from Arm, Right     Triglycerides [[id number]] Collected: 04/06/21 0645    Lab Status: No result Specimen: Blood from Arm, Right     Protime-INR [[id number]] Collected: 04/06/21 0645    Lab Status: No result Specimen: Blood from Arm, Right     CK (with reflex to MB) [[id number]] Collected: 04/06/21 0645    Lab Status: No result Specimen: Blood from Arm, Right     Procalcitonin with AM Reflex [[id number]] Collected: 04/06/21 0645    Lab Status: No result Specimen: Blood from Arm, Right     D-dimer, quantitative [[id number]] Collected: 04/06/21 0645    Lab Status: No result Specimen: Blood from Arm, Right                  XR chest 1 view portable    (Results Pending)              Procedures  Procedures         ED Course  ED Course as of Apr 06 0649   Tue Apr 06, 2021   5987 Pt hypoxic on RA, improved on 2L NC O2  Covid workup ordered  Will likely require admission as he requires O2  S/o to Dr Joseph Arredondo  MDM  Number of Diagnoses or Management Options  COVID-19: new and requires workup  Dyspnea: new and requires workup     Amount and/or Complexity of Data Reviewed  Clinical lab tests: ordered  Tests in the radiology section of CPT®: ordered  Tests in the medicine section of CPT®: ordered  Review and summarize past medical records: yes  Discuss the patient with other providers: yes    Risk of Complications, Morbidity, and/or Mortality  Presenting problems: moderate  Diagnostic procedures: low  Management options: low    Patient Progress  Patient progress: stable      Disposition  Final diagnoses:   COVID-19   Dyspnea     Time reflects when diagnosis was documented in both MDM as applicable and the Disposition within this note     Time User Action Codes Description Comment    4/6/2021  6:48 AM Edward ROWELL Add [U07 1] COVID-19     4/6/2021  6:48 AM Pao Fischer Add [R06 00] Dyspnea       ED Disposition     None      Follow-up Information    None         Patient's Medications   Discharge Prescriptions    No medications on file     No discharge procedures on file      PDMP Review       Value Time User    PDMP Reviewed  Yes 9/12/2019  2:51 PM Janine Green MD          ED Provider  Electronically Signed by           Thai Vidales MD  04/06/21 9642

## 2021-04-06 NOTE — ED CARE HANDOFF
Emergency Department Sign Out Note        Sign out and transfer of care from [address] [place] [address] 110**  See Separate Emergency Department note  The patient, Cierra Garcia, was evaluated by the previous provider for sob covid-19  Workup Completed:  bnp and d-dimer elevated  Hypokalemia present - chronic  ED Course / Workup Pending (followup): Patient requires 2 liters oxygen - texted Fly for admission                                  ED Course as of Apr 06 1519   Tue Apr 06, 2021   4469 Patient states that he did take his Lasix this morning          ECG 12 Lead Documentation Only    Date/Time: 4/6/2021 8:47 AM  Performed by: Iván Baptiste DO  Authorized by: Ivná Baptiste DO     Indications / Diagnosis:  Sob  ECG reviewed by me, the ED Provider: yes    Patient location:  ED  Previous ECG:     Previous ECG:  Compared to current    Comparison ECG info:  4-1-21    Similarity:  Changes noted  Interpretation:     Interpretation: abnormal    Rate:     ECG rate:  62    ECG rate assessment: normal    Rhythm:     Rhythm: sinus rhythm    Ectopy:     Ectopy: PVCs    QRS:     QRS axis:  Normal    QRS intervals:  Normal  Conduction:     Conduction: abnormal      Abnormal conduction: non-specific intraventricular conduction delay    ST segments:     ST segments:  Normal  T waves:     T waves: inverted      Inverted:  V3      MDM  Number of Diagnoses or Management Options  COVID-19: new and requires workup  Dyspnea: new and requires workup  Hypokalemia: new and requires workup     Amount and/or Complexity of Data Reviewed  Clinical lab tests: reviewed  Tests in the radiology section of CPT®: reviewed  Obtain history from someone other than the patient: yes  Discuss the patient with other providers: yes    Patient Progress  Patient progress: improved      Disposition  Final diagnoses:   COVID-19   Dyspnea   Hypokalemia     Time reflects when diagnosis was documented in both MDM as applicable and the Disposition within this note Time User Action Codes Description Comment    4/6/2021  6:48 AM Donneta Levels S Add [U07 1] COVID-19     4/6/2021  6:48 AM Donneta Levels S Add [R06 00] Dyspnea     4/6/2021  8:23 AM Vel Jackson Add [E87 6] Hypokalemia     4/6/2021  9:26 AM Rocky Perez Add [I49 3] PVC (premature ventricular contraction)       ED Disposition     ED Disposition Condition Date/Time Comment    Admit Stable Tue Apr 6, 2021  8:23 AM Case was discussed with Zaid Marcano and the patient's admission status was agreed to be Admission Status: inpatient status to the service of Dr Desmond Sorto**           Follow-up Information    None       Current Discharge Medication List      CONTINUE these medications which have NOT CHANGED    Details   ascorbic acid (VITAMIN C) 500 mg tablet Take 1 tablet (500 mg total) by mouth 2 (two) times a day  Qty: 60 tablet, Refills: 1    Associated Diagnoses: Primary localized osteoarthritis of left knee      aspirin 81 MG tablet Take 1 tablet (81 mg total) by mouth daily    Associated Diagnoses: Primary localized osteoarthritis of left knee      atorvastatin (LIPITOR) 40 mg tablet Take 1 tablet (40 mg total) by mouth daily  Qty: 90 tablet, Refills: 3    Associated Diagnoses: Coronary artery disease involving native coronary artery of native heart without angina pectoris      diphenoxylate-atropine (LOMOTIL) 2 5-0 025 mg per tablet Take 1 tablet by mouth 4 (four) times a day as needed for diarrhea  Qty: 30 tablet, Refills: 1    Associated Diagnoses: Change in bowel habit      ferrous sulfate 324 MG TBEC Take 324 mg by mouth      furosemide (LASIX) 20 mg tablet TAKE 1 TABLET BY MOUTH TWICE A DAY  Qty: 180 tablet, Refills: 1    Associated Diagnoses: Chest pain      lisinopril (ZESTRIL) 20 mg tablet TAKE 1 TABLET BY MOUTH EVERY DAY  Qty: 90 tablet, Refills: 1    Associated Diagnoses: Essential hypertension      mesalamine (ROWASA) 4 g Insert 60 mL (4 g total) into the rectum daily at bedtime  Qty: 30 Bottle, Refills: 1 Associated Diagnoses: Change in bowel habit      metFORMIN (GLUCOPHAGE-XR) 500 mg 24 hr tablet TAKE 1 TABLET BY MOUTH EVERY DAY WITH DINNER  Qty: 90 tablet, Refills: 2    Associated Diagnoses: Type 2 diabetes mellitus without complication, without long-term current use of insulin ([facility])      metoprolol succinate (TOPROL-XL) 100 mg 24 hr tablet TAKE 1 TABLET BY MOUTH EVERY DAY  Qty: 90 tablet, Refills: 1    Associated Diagnoses: Coronary artery disease involving native coronary artery of native heart without angina pectoris; Paroxysmal atrial fibrillation ([facility] [address] ); Chronic diastolic congestive heart failure ([facility] [address] ); Essential hypertension      Multiple Vitamins-Minerals (ICAPS AREDS 2) CAPS Take 1 capsule by mouth daily      nitroglycerin (NITROSTAT) 0 3 mg SL tablet Nitrostat 0 3 mg sublingual tablet   Place 1 tablet as needed by sublingual route as needed for 90 days  omeprazole (PriLOSEC) 40 MG capsule Take 1 capsule (40 mg total) by mouth daily  Qty: 90 capsule, Refills: 3    Associated Diagnoses: Gastroesophageal reflux disease with esophagitis; Roberts's esophagus without dysplasia; Esophageal dysphagia; Schatzki's ring of distal esophagus           No discharge procedures on file         ED Provider  Electronically Signed by     Leilani Nielson DO  04/06/21 5761

## 2021-04-06 NOTE — PLAN OF CARE
Problem: Potential for Falls  Goal: Patient will remain free of falls  Description: INTERVENTIONS:  - Assess patient frequently for physical needs  -  Identify cognitive and physical deficits and behaviors that affect risk of falls    -  [place] fall precautions as indicated by assessment   - Educate patient/family on patient safety including physical limitations  - Instruct patient to call for assistance with activity based on assessment  - Modify environment to reduce risk of injury  - Consider OT/PT consult to assist with strengthening/mobility  Outcome: Progressing     Problem: PAIN - ADULT  Goal: Verbalizes/displays adequate comfort level or baseline comfort level  Description: Interventions:  - Encourage patient to monitor pain and request assistance  - Assess pain using appropriate pain scale  - Administer analgesics based on type and severity of pain and evaluate response  - Implement non-pharmacological measures as appropriate and evaluate response  - Consider cultural and social influences on pain and pain management  - Notify physician/advanced practitioner if interventions unsuccessful or patient reports new pain  Outcome: Progressing     Problem: INFECTION - ADULT  Goal: Absence or prevention of progression during hospitalization  Description: INTERVENTIONS:  - Assess and monitor for signs and symptoms of infection  - Monitor lab/diagnostic results  - Monitor all insertion sites, i e  indwelling lines, tubes, and drains  - Monitor endotracheal if appropriate and nasal secretions for changes in amount and color  - [place] appropriate cooling/warming therapies per order  - Administer medications as ordered  - Instruct and encourage patient and family to use good hand hygiene technique  - Identify and instruct in appropriate isolation precautions for identified infection/condition  Outcome: Progressing  Goal: Absence of fever/infection during neutropenic period  Description: INTERVENTIONS:  - Monitor WBC    Outcome: Progressing     Problem: SAFETY ADULT  Goal: Patient will remain free of falls  Description: INTERVENTIONS:  - Assess patient frequently for physical needs  -  Identify cognitive and physical deficits and behaviors that affect risk of falls    -  [place] fall precautions as indicated by assessment   - Educate patient/family on patient safety including physical limitations  - Instruct patient to call for assistance with activity based on assessment  - Modify environment to reduce risk of injury  - Consider OT/PT consult to assist with strengthening/mobility  Outcome: Progressing  Goal: Maintain or return to baseline ADL function  Description: INTERVENTIONS:  -  Assess patient's ability to carry out ADLs; assess patient's baseline for ADL function and identify physical deficits which impact ability to perform ADLs (bathing, care of mouth/teeth, toileting, grooming, dressing, etc )  - Assess/evaluate cause of self-care deficits   - Assess range of motion  - Assess patient's mobility; develop plan if impaired  - Assess patient's need for assistive devices and provide as appropriate  - Encourage maximum independence but intervene and supervise when necessary  - Involve family in performance of ADLs  - Assess for home care needs following discharge   - Consider OT consult to assist with ADL evaluation and planning for discharge  - Provide patient education as appropriate  Outcome: Progressing  Goal: Maintain or return mobility status to optimal level  Description: INTERVENTIONS:  - Assess patient's baseline mobility status (ambulation, transfers, stairs, etc )    - Identify cognitive and physical deficits and behaviors that affect mobility  - Identify mobility aids required to assist with transfers and/or ambulation (gait belt, sit-to-stand, lift, walker, cane, etc )  - [place] fall precautions as indicated by assessment  - Record patient progress and toleration of activity level on Mobility SBAR; progress patient to next Phase/Stage  - Instruct patient to call for assistance with activity based on assessment  - Consider rehabilitation consult to assist with strengthening/weightbearing, etc   Outcome: Progressing     Problem: DISCHARGE PLANNING  Goal: Discharge to home or other facility with appropriate resources  Description: INTERVENTIONS:  - Identify barriers to discharge w/patient and caregiver  - Arrange for needed discharge resources and transportation as appropriate  - Identify discharge learning needs (meds, wound care, etc )  - Arrange for interpretive services to assist at discharge as needed  - Refer to Case Management Department for coordinating discharge planning if the patient needs post-hospital services based on physician/advanced practitioner order or complex needs related to functional status, cognitive ability, or social support system  Outcome: Progressing     Problem: Knowledge Deficit  Goal: Patient/family/caregiver demonstrates understanding of disease process, treatment plan, medications, and discharge instructions  Description: Complete learning assessment and assess knowledge base    Interventions:  - Provide teaching at level of understanding  - Provide teaching via preferred learning methods  Outcome: Progressing

## 2021-04-07 PROBLEM — J12.82 PNEUMONIA DUE TO COVID-19 VIRUS: Status: ACTIVE | Noted: 2021-04-07

## 2021-04-07 PROBLEM — U07.1 PNEUMONIA DUE TO COVID-19 VIRUS: Status: ACTIVE | Noted: 2021-04-07

## 2021-04-07 LAB
ALBUMIN SERPL BCP-MCNC: 2.2 G/DL (ref 3.5–5)
ALP SERPL-CCNC: 94 U/L (ref 46–116)
ALT SERPL W P-5'-P-CCNC: 21 U/L (ref 12–78)
ANION GAP SERPL CALCULATED.3IONS-SCNC: 6 MMOL/L (ref 4–13)
AST SERPL W P-5'-P-CCNC: 37 U/L (ref 5–45)
ATRIAL RATE: 61 BPM
ATRIAL RATE: 65 BPM
BASOPHILS # BLD AUTO: 0.01 THOUSANDS/ΜL (ref 0–0.1)
BASOPHILS NFR BLD AUTO: 0 % (ref 0–1)
BILIRUB SERPL-MCNC: 0.7 MG/DL (ref 0.2–1)
BUN SERPL-MCNC: 16 MG/DL (ref 5–25)
CALCIUM ALBUM COR SERPL-MCNC: 9.1 MG/DL (ref 8.3–10.1)
CALCIUM SERPL-MCNC: 7.7 MG/DL (ref 8.3–10.1)
CHLORIDE SERPL-SCNC: 103 MMOL/L (ref 100–108)
CK MB SERPL-MCNC: 1.7 NG/ML (ref 0–5)
CK MB SERPL-MCNC: <1 % (ref 0–2.5)
CK SERPL-CCNC: 193 U/L (ref 39–308)
CO2 SERPL-SCNC: 28 MMOL/L (ref 21–32)
CREAT SERPL-MCNC: 0.98 MG/DL (ref 0.6–1.3)
CRP SERPL QL: 65.6 MG/L
D DIMER PPP FEU-MCNC: 1.37 UG/ML FEU
EOSINOPHIL # BLD AUTO: 0 THOUSAND/ΜL (ref 0–0.61)
EOSINOPHIL NFR BLD AUTO: 0 % (ref 0–6)
ERYTHROCYTE [DISTWIDTH] IN BLOOD BY AUTOMATED COUNT: 17.6 % (ref 11.6–15.1)
FERRITIN SERPL-MCNC: 333 NG/ML (ref 8–388)
GFR SERPL CREATININE-BSD FRML MDRD: 76 ML/MIN/1.73SQ M
GLUCOSE SERPL-MCNC: 127 MG/DL (ref 65–140)
GLUCOSE SERPL-MCNC: 127 MG/DL (ref 65–140)
GLUCOSE SERPL-MCNC: 146 MG/DL (ref 65–140)
GLUCOSE SERPL-MCNC: 163 MG/DL (ref 65–140)
HCT VFR BLD AUTO: 31.8 % (ref 36.5–49.3)
HGB BLD-MCNC: 9.7 G/DL (ref 12–17)
IMM GRANULOCYTES # BLD AUTO: 0.06 THOUSAND/UL (ref 0–0.2)
IMM GRANULOCYTES NFR BLD AUTO: 1 % (ref 0–2)
LYMPHOCYTES # BLD AUTO: 0.8 THOUSANDS/ΜL (ref 0.6–4.47)
LYMPHOCYTES NFR BLD AUTO: 13 % (ref 14–44)
MCH RBC QN AUTO: 24 PG (ref 26.8–34.3)
MCHC RBC AUTO-ENTMCNC: 30.5 G/DL (ref 31.4–37.4)
MCV RBC AUTO: 79 FL (ref 82–98)
MONOCYTES # BLD AUTO: 0.45 THOUSAND/ΜL (ref 0.17–1.22)
MONOCYTES NFR BLD AUTO: 7 % (ref 4–12)
NEUTROPHILS # BLD AUTO: 4.83 THOUSANDS/ΜL (ref 1.85–7.62)
NEUTS SEG NFR BLD AUTO: 79 % (ref 43–75)
NRBC BLD AUTO-RTO: 0 /100 WBCS
PLATELET # BLD AUTO: 156 THOUSANDS/UL (ref 149–390)
POTASSIUM SERPL-SCNC: 3.6 MMOL/L (ref 3.5–5.3)
PR INTERVAL: 208 MS
PROCALCITONIN SERPL-MCNC: 0.21 NG/ML
PROT SERPL-MCNC: 5.5 G/DL (ref 6.4–8.2)
QRS AXIS: -35 DEGREES
QRS AXIS: -47 DEGREES
QRSD INTERVAL: 130 MS
QRSD INTERVAL: 134 MS
QT INTERVAL: 530 MS
QT INTERVAL: 586 MS
QTC INTERVAL: 551 MS
QTC INTERVAL: 580 MS
RBC # BLD AUTO: 4.04 MILLION/UL (ref 3.88–5.62)
SODIUM SERPL-SCNC: 137 MMOL/L (ref 136–145)
T WAVE AXIS: 11 DEGREES
T WAVE AXIS: 73 DEGREES
VENTRICULAR RATE: 59 BPM
VENTRICULAR RATE: 65 BPM
WBC # BLD AUTO: 6.15 THOUSAND/UL (ref 4.31–10.16)

## 2021-04-07 PROCEDURE — 84145 PROCALCITONIN (PCT): CPT | Performed by: INTERNAL MEDICINE

## 2021-04-07 PROCEDURE — 80053 COMPREHEN METABOLIC PANEL: CPT | Performed by: INTERNAL MEDICINE

## 2021-04-07 PROCEDURE — 82948 REAGENT STRIP/BLOOD GLUCOSE: CPT

## 2021-04-07 PROCEDURE — 85379 FIBRIN DEGRADATION QUANT: CPT | Performed by: INTERNAL MEDICINE

## 2021-04-07 PROCEDURE — 82550 ASSAY OF CK (CPK): CPT | Performed by: INTERNAL MEDICINE

## 2021-04-07 PROCEDURE — 86140 C-REACTIVE PROTEIN: CPT | Performed by: INTERNAL MEDICINE

## 2021-04-07 PROCEDURE — 82728 ASSAY OF FERRITIN: CPT | Performed by: INTERNAL MEDICINE

## 2021-04-07 PROCEDURE — 99232 SBSQ HOSP IP/OBS MODERATE 35: CPT | Performed by: INTERNAL MEDICINE

## 2021-04-07 PROCEDURE — 93010 ELECTROCARDIOGRAM REPORT: CPT | Performed by: INTERNAL MEDICINE

## 2021-04-07 PROCEDURE — 93005 ELECTROCARDIOGRAM TRACING: CPT

## 2021-04-07 PROCEDURE — 82553 CREATINE MB FRACTION: CPT | Performed by: INTERNAL MEDICINE

## 2021-04-07 PROCEDURE — 85025 COMPLETE CBC W/AUTO DIFF WBC: CPT | Performed by: INTERNAL MEDICINE

## 2021-04-07 RX ORDER — DOXYCYCLINE HYCLATE 100 MG/1
100 CAPSULE ORAL EVERY 12 HOURS SCHEDULED
Status: DISCONTINUED | OUTPATIENT
Start: 2021-04-07 | End: 2021-04-10 | Stop reason: HOSPADM

## 2021-04-07 RX ADMIN — FUROSEMIDE 20 MG: 20 TABLET ORAL at 17:38

## 2021-04-07 RX ADMIN — DOXYCYCLINE 100 MG: 100 CAPSULE ORAL at 21:46

## 2021-04-07 RX ADMIN — Medication 2000 UNITS: at 08:14

## 2021-04-07 RX ADMIN — OXYCODONE HYDROCHLORIDE AND ACETAMINOPHEN 1000 MG: 500 TABLET ORAL at 08:14

## 2021-04-07 RX ADMIN — METOPROLOL SUCCINATE 100 MG: 50 TABLET, EXTENDED RELEASE ORAL at 08:14

## 2021-04-07 RX ADMIN — LISINOPRIL 20 MG: 20 TABLET ORAL at 08:15

## 2021-04-07 RX ADMIN — FUROSEMIDE 20 MG: 20 TABLET ORAL at 08:15

## 2021-04-07 RX ADMIN — ENOXAPARIN SODIUM 40 MG: 40 INJECTION SUBCUTANEOUS at 08:18

## 2021-04-07 RX ADMIN — ZINC SULFATE 220 MG (50 MG) CAPSULE 220 MG: CAPSULE at 08:15

## 2021-04-07 RX ADMIN — PANTOPRAZOLE SODIUM 40 MG: 40 TABLET, DELAYED RELEASE ORAL at 06:24

## 2021-04-07 RX ADMIN — ATORVASTATIN CALCIUM 40 MG: 40 TABLET, FILM COATED ORAL at 08:14

## 2021-04-07 RX ADMIN — DEXAMETHASONE SODIUM PHOSPHATE 6 MG: 4 INJECTION, SOLUTION INTRA-ARTICULAR; INTRALESIONAL; INTRAMUSCULAR; INTRAVENOUS; SOFT TISSUE at 08:10

## 2021-04-07 RX ADMIN — ASPIRIN 81 MG: 81 TABLET, CHEWABLE ORAL at 08:15

## 2021-04-07 RX ADMIN — OXYCODONE HYDROCHLORIDE AND ACETAMINOPHEN 1000 MG: 500 TABLET ORAL at 21:46

## 2021-04-07 RX ADMIN — FERROUS SULFATE TAB 325 MG (65 MG ELEMENTAL FE) 325 MG: 325 (65 FE) TAB at 08:15

## 2021-04-07 RX ADMIN — INSULIN LISPRO 1 UNITS: 100 INJECTION, SOLUTION INTRAVENOUS; SUBCUTANEOUS at 21:44

## 2021-04-07 RX ADMIN — REMDESIVIR 100 MG: 100 INJECTION, POWDER, LYOPHILIZED, FOR SOLUTION INTRAVENOUS at 14:14

## 2021-04-07 NOTE — PROGRESS NOTES
Progress Note - Citlaly Savage 77 y o  male MRN: [id number]    Unit/Bed#: -01 Encounter: [id number]      Assessment:  Principal Problem:    Pneumonia due to COVID-19 virus  Active Problems:    Chronic diastolic congestive heart failure (HCC)    Type 2 diabetes mellitus without complication, without long-term current use of insulin (HCC)    Dyslipidemia    Hx of CABG    Essential hypertension    History of aortic valve replacement with bioprosthetic valve    Fatty liver    Malignant tumor of cecum (HCC)    Multiple myeloma not having achieved remission (HCC)    Paroxysmal atrial fibrillation (HCC)    Roberts's esophagus without dysplasia  Resolved Problems:    * No resolved hospital problems  *        Plan:  · COVID-19 related pneumonia-mild protocol on 2 L nasal cannula-continue with remdesivir and steroids-procalcitonin trending-repeat labs in the a m -LFTs are normal  · Acute hypoxic respiratory failure-still on 2 L-O2 sats are stable at present  · Diabetes mellitus type 2-fasting glucose 120 continue on coverage  ·  hypertension continue to monitor closely  · Coronary artery disease with history CABG-no signs of active angina-does have history of chronic diastolic CHF but no signs of decompensation-troponin 0 04 will continue to trend  · Anemia-will check iron levels and stool hematest- may be chronic disease related  Subjective:     Patient reports feeling less short of breath today  Still with ongoing cough but limited sputum production  ROS  Comprehensive system review negative other than noted above    Objective:     Vitals: Blood pressure 145/68, pulse 56, temperature (!) 96 7 °F (35 9 °C), temperature source Axillary, resp  rate 18, height 5' 11" (1 803 m), weight 76 7 kg (169 lb 1 5 oz), SpO2 93 %  ,Body mass index is 23 58 kg/m²    Current Facility-Administered Medications   Medication Dose Route Frequency Provider Last Rate Last Admin    ascorbic acid (VITAMIN C) tablet 1,000 mg  1,000 mg Oral Q12H [facility], DO   1,000 mg at 04/07/21 1040    aspirin chewable tablet 81 mg  81 mg Oral Daily Nikia Fly, DO   81 mg at 04/07/21 0815    atorvastatin (LIPITOR) tablet 40 mg  40 mg Oral Daily Nikia Fly, DO   40 mg at 04/07/21 0814    cholecalciferol (VITAMIN D3) tablet 2,000 Units  2,000 Units Oral Daily Nikia Fly, DO   2,000 Units at 04/07/21 0814    dexamethasone (DECADRON) injection 6 mg  6 mg Intravenous Q24H Nikia Fly, DO   6 mg at 04/07/21 0810    diphenoxylate-atropine (LOMOTIL) 2 5-0 025 mg per tablet 1 tablet  1 tablet Oral 4x Daily PRN Kathrin Paz DO        enoxaparin (LOVENOX) subcutaneous injection 40 mg  40 mg Subcutaneous Daily Nikia Fly, DO   40 mg at 04/07/21 0818    ferrous sulfate tablet 325 mg  325 mg Oral Daily With Breakfast Nikia Fly, DO   325 mg at 04/07/21 0815    furosemide (LASIX) tablet 20 mg  20 mg Oral BID Nikia Fly, DO   20 mg at 04/07/21 0815    insulin lispro (HumaLOG) 100 units/mL subcutaneous injection 1-6 Units  1-6 Units Subcutaneous TID AC Karen Monte PA-C        insulin lispro (HumaLOG) 100 units/mL subcutaneous injection 1-6 Units  1-6 Units Subcutaneous HS Karen Monte PA-C   1 Units at 04/06/21 2259    lisinopril (ZESTRIL) tablet 20 mg  20 mg Oral Daily Nikia Fly, DO   20 mg at 04/07/21 0815    mesalamine (ROWASA) enema 4 g  4 g Rectal HS Nikia Fly, DO        metoprolol succinate (TOPROL-XL) 24 hr tablet 100 mg  100 mg Oral Daily Nikia Fly, DO   100 mg at 04/07/21 0814    zinc sulfate (ZINCATE) capsule 220 mg  220 mg Oral Daily Nikia Fly, DO   220 mg at 04/07/21 0815    Followed by   Cheryle Cui ON 4/13/2021] multivitamin-minerals (CENTRUM ADULTS) tablet 1 tablet  1 tablet Oral Daily Nikia Fly, DO        nitroglycerin (NITROSTAT) SL tablet 0 3 mg  0 3 mg Sublingual Q5 Min PRN Nikia Fly, DO        pantoprazole (PROTONIX) EC tablet 40 mg  40 mg Oral Early Morning Nikia Fly, DO   40 mg at 04/07/21 6033    remdesivir (Veklury) 100 mg in sodium chloride 0 9 % 250 mL IVPB  100 mg Intravenous Q24H Nikia DO Chris 500 mL/hr at 04/07/21 1414 100 mg at 04/07/21 1414     Medications Prior to Admission   Medication    ascorbic acid (VITAMIN C) 500 mg tablet    aspirin 81 MG tablet    atorvastatin (LIPITOR) 40 mg tablet    diphenoxylate-atropine (LOMOTIL) 2 5-0 025 mg per tablet    ferrous sulfate 324 MG TBEC    furosemide (LASIX) 20 mg tablet    lisinopril (ZESTRIL) 20 mg tablet    mesalamine (ROWASA) 4 g    metFORMIN (GLUCOPHAGE-XR) 500 mg 24 hr tablet    metoprolol succinate (TOPROL-XL) 100 mg 24 hr tablet    Multiple Vitamins-Minerals (ICAPS AREDS 2) CAPS    nitroglycerin (NITROSTAT) 0 3 mg SL tablet    omeprazole (PriLOSEC) 40 MG capsule       No intake or output data in the 24 hours ending 04/07/21 1509    Physical Exam:  General appearance: alert and cooperative  Neck: no adenopathy, no JVD and thyroid not enlarged, symmetric, no tenderness/mass/nodules  Lungs:  Scattered rhonchi right greater than left    Heart: regular rate and rhythm, S1, S2 normal, no murmur, click, rub or gallop and Rare extrasystole  Abdomen: soft, non-tender; bowel sounds normal; no masses,  no organomegaly  Extremities: extremities normal, warm and well-perfused; no cyanosis, clubbing, or edema  Skin: Skin color, texture, turgor normal  No rashes or lesions  Neurologic:  No tremors  No focal motor deficits       Lab, Imaging and other studies: I have personally reviewed pertinent reports            Results from last 7 days   Lab Units 04/07/21  0300 04/06/21  0645 04/01/21  0948   WBC Thousand/uL 6 15 11 35* 8 35   HEMOGLOBIN g/dL 9 7* 10 4* 10 5*   HEMATOCRIT % 31 8* 34 4* 35 5*   NEUTROS PCT % 79* 82* 71   LYMPHS PCT % 13* 11* 13*   MONOS PCT % 7 4 12   EOS PCT % 0 2 2     Results from last 7 days   Lab Units 04/07/21  0300 04/06/21 1957 04/06/21  0645 04/01/21  0948   POTASSIUM mmol/L 3 6 3 4* 2 9* 3 0*   CHLORIDE mmol/L 103  --  99* 98*   CO2 mmol/L 28  --  30 33*   BUN mg/dL 16  --  12 14   CREATININE mg/dL 0 98  --  1 26 1 12   CALCIUM mg/dL 7 7*  --  8 2* 8 8   ALK PHOS U/L 94  --  126* 136*   ALT U/L 21  --  26 20   AST U/L 37  --  54* 22     Lab Results   Component Value Date    TROPONINI 0 04 04/06/2021    TROPONINI 0 05 (H) 04/06/2021    TROPONINI 0 05 (H) 04/06/2021    CKMB 1 7 04/07/2021    CKMB 1 9 04/06/2021    CKTOTAL 193 04/07/2021    CKTOTAL 341 (H) 04/06/2021     Results from last 7 days   Lab Units 04/06/21  0645 04/01/21  0948   INR  1 12 1 14     Lab Results   Component Value Date    BLOODCX No Growth at 24 hrs  04/06/2021    BLOODCX No Growth at 24 hrs  04/06/2021    BLOODCX No Growth After 5 Days  04/01/2021       Imaging:  Results for orders placed during the hospital encounter of 04/06/21   XR chest 1 view portable    Narrative CHEST     INDICATION:   covid, sob  Patient has confirmed COVID-19  Positive on 4/1/2021  COMPARISON:  Chest radiograph from 9/11/2019 and chest CT from 3/26/2021  EXAM PERFORMED/VIEWS:  XR CHEST PORTABLE  FINDINGS:    Cardiomediastinal silhouette normal  AVR  Extensive bilateral groundglass opacity, greater on the right  No effusion or pneumothorax  Mild degenerative disease in the spine and involving both glenohumeral joints  Impression Extensive bilateral ground was opacity, greater on the right, due to Covid 19 pneumonia  Workstation performed: [id number]       Results for orders placed during the hospital encounter of 09/11/19   XR chest 2 views    Narrative CHEST     INDICATION:   Chest Pain  COMPARISON:  8/20/2019  EXAM PERFORMED/VIEWS:  XR CHEST PA & LATERAL  The frontal view was performed utilizing dual energy radiographic technique  Images: 4    FINDINGS:  There are median sternotomy wires indicating prior cardiac surgery  Cardiomediastinal silhouette appears unremarkable  The lungs are clear  No pneumothorax or pleural effusion  Osseous structures appear within normal limits for patient age        Impression No acute cardiopulmonary disease  Workstation performed: [id number]         PATIENT CENTERED ROUNDS: I have performed rounds with the nursing staff            Cinthia Russell DO

## 2021-04-07 NOTE — UTILIZATION REVIEW
Initial Clinical Review    Admission: Date/Time/Statement:   Admission Orders (From admission, onward)     Ordered        04/06/21 0823  Inpatient Admission  Once                   Orders Placed This Encounter   Procedures    Inpatient Admission     Standing Status:   Standing     Number of Occurrences:   1     Order Specific Question:   Level of Care     Answer:   Med Surg [16]     Order Specific Question:   Estimated length of stay     Answer:   More than 2 Midnights     Order Specific Question:   Certification     Answer:   I certify that inpatient services are medically necessary for this patient for a duration of greater than two midnights  See H&P and MD Progress Notes for additional information about the patient's course of treatment  ED Arrival Information     Expected Arrival Acuity Means of Arrival Escorted By Service Admission Type    - 4/6/2021 06:19 Urgent Walk-In Self General Medicine Urgent    Arrival Complaint    Shortness OF Breath        Chief Complaint   Patient presents with    Shortness of Breath     pt tested positive for covid and has become increasingly SOB     Assessment/Plan: 78 yo male PMH of afib, colon ca-not currently on CHEMO , dm2, (+COVID PCR=4/1)  to ED from home reports worsening Dyspnea at home ; home pulse OX check=high 70s  IN ED: hypertensive, Pulse OX=88% in room air NC 2 L begun, CXR with bilateral opacities w/ R>L  Noted elevated bnp, D dimer, hypokalemia  Continue with ongoing COVID 19 treatment, IV Remdesivir, IV Dexamethasone, vitamin cocktail, supplemental O2  Monitor resp status, Daily labs  4/7/2021 Attending  Cont on MILD pathway for COVID related PNA on 2 L NC w IV remdesivr, IV steroids; trend pro calcitonin,  repeat labs in am, LFTs normal  SSI  Monitor BPs closely, trend trops, O2 saturations, temps;  check iron levels & hema test stool may be chronic anemia     ED Triage Vitals   Temperature Pulse Respirations Blood Pressure SpO2   04/06/21 0631 04/06/21 0631 04/06/21 0631 04/06/21 0631 04/06/21 0631   98 °F (36 7 °C) 77 20 (!) 171/72 (!) 88 %      Temp Source Heart Rate Source Patient Position - Orthostatic VS BP Location FiO2 (%)   04/06/21 0631 04/06/21 0631 04/06/21 0631 04/06/21 0631 --   Temporal Monitor Lying Right arm       Pain Score       04/06/21 1300       No Pain          Wt Readings from Last 1 Encounters:   04/07/21 76 7 kg (169 lb 1 5 oz)     Additional Vital Signs:   Date/Time  Temp  Pulse  Resp  BP  MAP (mmHg)  SpO2  Calculated FIO2 (%) - Nasal Cannula  Nasal Cannula O2 Flow Rate (L/min)  O2 Device  Patient Position - Orthostatic VS   04/07/21 08:05:21  96 7 °F (35 9 °C)Abnormal    56  18  145/68  94  93 %  28  2 L/min  Nasal cannula  Lying   Temp: room cool at 04/07/21 0805   04/07/21 0400  --  48Abnormal   --  --  --  93 %  28  2 L/min  Nasal cannula  --   04/06/21 22:58:33  97 6 °F (36 4 °C)  51Abnormal   --  154/71  99  94 %  --  --  --  --   04/06/21 2100  --  --  --  --  --  --  28  2 L/min  Nasal cannula  --   04/06/21 1954  --  --  --  --  --  --  28  2 L/min  Nasal cannula  --   04/06/21 14:40:16  97 5 °F (36 4 °C)  60  --  157/69  98  99 %  28  2 L/min  Nasal cannula  --   04/06/21 1440  --  --  20  --  --  --  28  2 L/min  Nasal cannula  Sitting   04/06/21 1300  --  58  20  176/79Abnormal   113  98 %  --  --  --  Lying   04/06/21 0633  --  --  --  --  --  --  --  --  None (Room air)  --   04/06/21 0631  98 °F (36 7 °C)  77  20  171/72Abnormal   --  88 %Abnormal   --  --  None (Room air)  Lying      Weights (last 14 days)    Date/Time  Weight  Weight Method  Height   04/07/21 0600  76 7 kg (169 lb 1 5 oz)  Standing scale  --   04/06/21 1548  77 9 kg (171 lb 12 8 oz)  Standing scale  --   04/06/21 1500  77 9 kg (171 lb 12 8 oz)  Standing scale  --   04/06/21 14:40:16  76 2 kg (168 lb)  Stated  5' 11" (1 803 m)   04/06/21 0631  76 2 kg (168 lb)  --  --       Pertinent Labs/Diagnostic Test Results:       Results from last 7 days   Lab Units 04/07/21  0300 04/06/21  0645 04/01/21  0948   WBC Thousand/uL 6 15 11 35* 8 35   HEMOGLOBIN g/dL 9 7* 10 4* 10 5*   HEMATOCRIT % 31 8* 34 4* 35 5*   NEUTROS ABS Thousands/µL 4 83 9 32* 6 03         Results from last 7 days   Lab Units 04/07/21  0300 04/06/21  1957 04/06/21  0645 04/01/21  0948   SODIUM mmol/L 137  --  141 141   POTASSIUM mmol/L 3 6 3 4* 2 9* 3 0*   CHLORIDE mmol/L 103  --  99* 98*   CO2 mmol/L 28  --  30 33*   ANION GAP mmol/L 6  --  12 10   BUN mg/dL 16  --  12 14   CREATININE mg/dL 0 98  --  1 26 1 12   EGFR ml/min/1 73sq m 76  --  56 64   CALCIUM mg/dL 7 7*  --  8 2* 8 8   CALCIUM, IONIZED mmol/L  --   --  0 99*  --    MAGNESIUM mg/dL  --  1 9 1 8  --      Results from last 7 days   Lab Units 04/07/21  0300 04/06/21  0645 04/01/21  0948   AST U/L 37 54* 22   ALT U/L 21 26 20   ALK PHOS U/L 94 126* 136*   TOTAL PROTEIN g/dL 5 5* 6 9 7 3   ALBUMIN g/dL 2 2* 2 9* 3 3*   TOTAL BILIRUBIN mg/dL 0 70 0 90 0 90     Results from last 7 days   Lab Units 04/07/21  0802 04/06/21  2128   POC GLUCOSE mg/dl 127 167*     Results from last 7 days   Lab Units 04/07/21  0300 04/06/21  0645 04/01/21  0948   GLUCOSE RANDOM mg/dL 127 107 108             No results found for: BETA-HYDROXYBUTYRATE               Results from last 7 days   Lab Units 04/07/21  0300 04/06/21  0645   CK TOTAL U/L 193 341*   CK MB INDEX % <1 0 <1 0   CK MB ng/mL 1 7 1 9     Results from last 7 days   Lab Units 04/06/21  1859 04/06/21  1537 04/06/21  1248 04/06/21  0645   TROPONIN I ng/mL 0 04 0 05* 0 05* 0 04     Results from last 7 days   Lab Units 04/07/21  0300 04/06/21  0645   D-DIMER QUANTITATIVE ug/ml FEU 1 37* 2 19*     Results from last 7 days   Lab Units 04/06/21  0645 04/01/21  0948   PROTIME seconds 14 5 14 6*   INR  1 12 1 14   PTT seconds  --  33     Results from last 7 days   Lab Units 04/06/21  0645   TSH 3RD GENERATON uIU/mL 10 598*     Results from last 7 days   Lab Units 04/07/21  0300 04/06/21  0645 04/01/21  0275 PROCALCITONIN ng/ml 0 21 0 34* 0 09     Results from last 7 days   Lab Units 04/06/21  0645 04/01/21  0953   LACTIC ACID mmol/L 1 6 1 3             Results from last 7 days   Lab Units 04/06/21  0645   NT-PRO BNP pg/mL 7,740*     Results from last 7 days   Lab Units 04/07/21  0300 04/06/21  0645   FERRITIN ng/mL 333 385             Results from last 7 days   Lab Units 04/07/21  0300 04/06/21  0645   CRP mg/L 65 6* 78 3*         Results from last 7 days   Lab Units 04/06/21  1106 04/01/21  1029   CLARITY UA  Clear Clear   COLOR UA  Yellow Yellow   SPEC GRAV UA  1 010 1 010   PH UA  6 5 7 0   GLUCOSE UA mg/dl Negative Negative   KETONES UA mg/dl Negative Negative   BLOOD UA  Negative Negative   PROTEIN UA mg/dl Negative Negative   NITRITE UA  Negative Negative   BILIRUBIN UA  Negative Negative   UROBILINOGEN UA E U /dl 1 0 2 0*   LEUKOCYTES UA  Negative Negative           Results from last 7 days   Lab Units 04/06/21  0645 04/01/21  0953 04/01/21  0948   BLOOD CULTURE  No Growth at 24 hrs  No Growth at 24 hrs  No Growth After 5 Days  No Growth After 5 Days  XR chest 1 view portable   ED Interpretation by  (04/06 5000)   Extensive patchy pna bilaterally      Final Result byMD (04/06 0910)      Extensive bilateral ground was opacity, greater on the right, due to Covid 19 pneumonia       6/7/2021 ekg Sinus rhythm with Premature supraventricular complexes  Left axis deviation  Left bundle branch block  Abnormal ECG  When compared with ECG of 06-APR-2021 16:02, (unconfirmed)  No significant change was found  ED Treatment:   Medication Administration from 04/06/2021 0619 to 04/06/2021 1341       Date/Time Order Dose Route Action     04/06/2021 0649 sodium chloride 0 9 % bolus 500 mL 500 mL Intravenous New Bag     04/06/2021 0843 potassium chloride (K-DUR,KLOR-CON) CR tablet 40 mEq 40 mEq Oral Given     04/06/2021 0843 famotidine (PEPCID) injection 20 mg 20 mg Intravenous Given     04/06/2021 0843 dexamethasone (DECADRON) injection 6 mg 6 mg Intravenous Given     04/06/2021 1140 enoxaparin (LOVENOX) subcutaneous injection 40 mg 40 mg Subcutaneous Given     04/06/2021 1140 cholecalciferol (VITAMIN D3) tablet 2,000 Units 2,000 Units Oral Given     04/06/2021 1140 ascorbic acid (VITAMIN C) tablet 1,000 mg 1,000 mg Oral Given     04/06/2021 1140 zinc sulfate (ZINCATE) capsule 220 mg 220 mg Oral Given     04/06/2021 1140 remdesivir (Veklury) 200 mg in sodium chloride 0 9 % 250 mL IVPB 200 mg Intravenous New Bag        Past Medical History:   Diagnosis Date    Arthritis     Atrial fibrillation ([facility] )     Cataract     Colitis     Colon cancer ([facility] )     Diabetes mellitus type II, non insulin dependent ([facility] )     Fatty liver     History of chemotherapy     History of radiation therapy     History of shingles 2018    Hypertension     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[facility] )     Pneumonia     Skin cancer      Present on Admission:  **None**      Admitting Diagnosis: Shortness of breath [R06 02]  Hypokalemia [E87 6]  PVC (premature ventricular contraction) [I49 3]  Dyspnea [R06 00]  COVID-19 [U07 1]  Age/Sex: 77 y o  male  Admission Orders:  Contact & airborne isolation  NC keep sats=>90%  scd orthostatic BP  Daily wt  Activity as daniel  Up w assist  Scheduled Medications:  ascorbic acid, 1,000 mg, Oral, Q12H DONNIE  aspirin, 81 mg, Oral, Daily  atorvastatin, 40 mg, Oral, Daily  cholecalciferol, 2,000 Units, Oral, Daily  dexamethasone, 6 mg, Intravenous, Q24H  enoxaparin, 40 mg, Subcutaneous, Daily  ferrous sulfate, 325 mg, Oral, Daily With Breakfast  furosemide, 20 mg, Oral, BID  insulin lispro, 1-6 Units, Subcutaneous, TID AC  insulin lispro, 1-6 Units, Subcutaneous, HS  lisinopril, 20 mg, Oral, Daily  mesalamine, 4 g, Rectal, HS  metoprolol succinate, 100 mg, Oral, Daily  zinc sulfate, 220 mg, Oral, Daily    Followed by  Eleno Singleton ON 4/13/2021] multivitamin-minerals, 1 tablet, Oral, Daily  pantoprazole, 40 mg, Oral, Early Morning  remdesivir, 100 mg, Intravenous, Q24H  Continuous IV Infusions:     PRN Meds:  diphenoxylate-atropine, 1 tablet, Oral, 4x Daily PRN  nitroglycerin, 0 3 mg, Sublingual, Q5 Min PRN    Network Utilization Review Department  ATTENTION: Please call with any questions or concerns to [phone] and carefully listen to the prompts so that you are directed to the right person  All voicemails are confidential   Caroline Skipper all requests for admission clinical reviews, approved or denied determinations and any other requests to dedicated fax number below belonging to the campus where the patient is receiving treatment   List of dedicated fax numbers for the Facilities:  [address] DENIALS (Administrative/Medical Necessity) [phone]   [address] (Maternity/NICU/Pediatrics) [phone] [address] Aureliano Barakat [phone] (  Josué Moses "Renee" 103) [address] [address] [place]) [address] [place] [address] [phone]

## 2021-04-07 NOTE — PLAN OF CARE
Problem: Potential for Falls  Goal: Patient will remain free of falls  Description: INTERVENTIONS:  - Assess patient frequently for physical needs  -  Identify cognitive and physical deficits and behaviors that affect risk of falls    -  [place] fall precautions as indicated by assessment   - Educate patient/family on patient safety including physical limitations  - Instruct patient to call for assistance with activity based on assessment  - Modify environment to reduce risk of injury  - Consider OT/PT consult to assist with strengthening/mobility  Outcome: Progressing     Problem: PAIN - ADULT  Goal: Verbalizes/displays adequate comfort level or baseline comfort level  Description: Interventions:  - Encourage patient to monitor pain and request assistance  - Assess pain using appropriate pain scale  - Administer analgesics based on type and severity of pain and evaluate response  - Implement non-pharmacological measures as appropriate and evaluate response  - Consider cultural and social influences on pain and pain management  - Notify physician/advanced practitioner if interventions unsuccessful or patient reports new pain  Outcome: Progressing     Problem: INFECTION - ADULT  Goal: Absence or prevention of progression during hospitalization  Description: INTERVENTIONS:  - Assess and monitor for signs and symptoms of infection  - Monitor lab/diagnostic results  - Monitor all insertion sites, i e  indwelling lines, tubes, and drains  - Monitor endotracheal if appropriate and nasal secretions for changes in amount and color  - [place] appropriate cooling/warming therapies per order  - Administer medications as ordered  - Instruct and encourage patient and family to use good hand hygiene technique  - Identify and instruct in appropriate isolation precautions for identified infection/condition  Outcome: Progressing  Goal: Absence of fever/infection during neutropenic period  Description: INTERVENTIONS:  - Monitor WBC    Outcome: Progressing     Problem: SAFETY ADULT  Goal: Patient will remain free of falls  Description: INTERVENTIONS:  - Assess patient frequently for physical needs  -  Identify cognitive and physical deficits and behaviors that affect risk of falls    -  [place] fall precautions as indicated by assessment   - Educate patient/family on patient safety including physical limitations  - Instruct patient to call for assistance with activity based on assessment  - Modify environment to reduce risk of injury  - Consider OT/PT consult to assist with strengthening/mobility  Outcome: Progressing  Goal: Maintain or return to baseline ADL function  Description: INTERVENTIONS:  -  Assess patient's ability to carry out ADLs; assess patient's baseline for ADL function and identify physical deficits which impact ability to perform ADLs (bathing, care of mouth/teeth, toileting, grooming, dressing, etc )  - Assess/evaluate cause of self-care deficits   - Assess range of motion  - Assess patient's mobility; develop plan if impaired  - Assess patient's need for assistive devices and provide as appropriate  - Encourage maximum independence but intervene and supervise when necessary  - Involve family in performance of ADLs  - Assess for home care needs following discharge   - Consider OT consult to assist with ADL evaluation and planning for discharge  - Provide patient education as appropriate  Outcome: Progressing  Goal: Maintain or return mobility status to optimal level  Description: INTERVENTIONS:  - Assess patient's baseline mobility status (ambulation, transfers, stairs, etc )    - Identify cognitive and physical deficits and behaviors that affect mobility  - Identify mobility aids required to assist with transfers and/or ambulation (gait belt, sit-to-stand, lift, walker, cane, etc )  - [place] fall precautions as indicated by assessment  - Record patient progress and toleration of activity level on Mobility SBAR; progress patient to next Phase/Stage  - Instruct patient to call for assistance with activity based on assessment  - Consider rehabilitation consult to assist with strengthening/weightbearing, etc   Outcome: Progressing     Problem: DISCHARGE PLANNING  Goal: Discharge to home or other facility with appropriate resources  Description: INTERVENTIONS:  - Identify barriers to discharge w/patient and caregiver  - Arrange for needed discharge resources and transportation as appropriate  - Identify discharge learning needs (meds, wound care, etc )  - Arrange for interpretive services to assist at discharge as needed  - Refer to Case Management Department for coordinating discharge planning if the patient needs post-hospital services based on physician/advanced practitioner order or complex needs related to functional status, cognitive ability, or social support system  Outcome: Progressing     Problem: Knowledge Deficit  Goal: Patient/family/caregiver demonstrates understanding of disease process, treatment plan, medications, and discharge instructions  Description: Complete learning assessment and assess knowledge base    Interventions:  - Provide teaching at level of understanding  - Provide teaching via preferred learning methods  Outcome: Progressing

## 2021-04-07 NOTE — CASE MANAGEMENT
LOS: 1 day  Pt is not a documented bundle  Pt is not a 30 day readmission  Unplanned readmission score is 21 and green  Call placed to Pt in hospital room due to COVID protocol  Pt presents AA&Ox3  Discussed role of case management and discharge planning  Pt reports he lives with his wife and sister-in-law in [address] [place] [address], has 1st floor setup, 1 nelly  Pt reports he is independent with adls and ambulation  Pt reports his PCP is Dr Albertus Bernheim  Pt reports he does not have POA but has living will  Pt reports he uses [facility] pharmacy in [facility], has prescription plan and is able to afford medications  Pt reports he had [facility] in past for VNA  Pt denies hx of SNF and mental health and drug and alcohol treatment  Pt reports his wife will help him home as needed  Pt reports his wife will transport him home upon discharge and declines VNA services  Reviewed IMM rights with Pt  Pt refused copy of IMM rights  CM to follow for home oxygen needs and discharge planning

## 2021-04-07 NOTE — H&P
H&P Exam - Cy Dhillon 77 y o  male MRN: [id number]    Unit/Bed#: -01 Encounter: [id number]    Assessment:   1  covid 19 related pneumonia- pt had second vaccine day prior to testing positive-  2  Acute respiratory  Failure- rquring 2l  3  Hypokalemia-  4  paf- noted to have pvc with nsr- no signs of recurrence  5  Hx colon cancer and hx malt lymphoma    Plan:  Mild covid protocol- will continue steroids and  remdisivir with  Iv antibiotics and procal trending    History of Present Illness   Patient reported increased cough and sob prompting er visit with positive covid test noted  He reports his wife is also positive despite having their second covid vaccine   I suspect they may have had exposure in few days prior to the vaccine  Review of Systems   Constitutional: Positive for appetite change and fatigue  Respiratory: Positive for cough, chest tightness and shortness of breath  Gastrointestinal: Positive for diarrhea  Negative for nausea  Has had increased diarrhea in past 6 months- on review of aggravating factors it may be related to stevia intake and will have him hold on those dietary supplements   All other systems reviewed and are negative        Historical Information   Past Medical History:   Diagnosis Date    Arthritis     [facility]     Cataract     Colitis     Colon cancer [facility]     Diabetes mellitus type II, non insulin dependent ([facility]     Fatty liver     History of chemotherapy     History of radiation therapy     History of shingles 2018    Hypertension     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[facility]     Pneumonia     Skin cancer      Past Surgical History:   Procedure Laterality Date    AORTIC VALVE REPLACEMENT      COLECTOMY      COLONOSCOPY  11/2019    Prior right hemicolectomy    CORONARY ARTERY BYPASS GRAFT      DENTAL SURGERY      KNEE SURGERY      LYMPHADENECTOMY      OTHER SURGICAL HISTORY      MAZE PROCEDURE    IA TOTAL KNEE ARTHROPLASTY Left 1/28/2019 Procedure: ARTHROPLASTY LEFT KNEE TOTAL;  Surgeon: Michael Nunes MD;  Location:  MAIN OR;  Service: Orthopedics    SKIN BIOPSY      UPPER GASTROINTESTINAL ENDOSCOPY  2019    Schatzki ring     Social History   Social History     Substance and Sexual Activity   Alcohol Use Yes    Frequency: 2-4 times a month    Drinks per session: 1 or 2    Binge frequency: Never    Comment: has not drank in over a month     Social History     Substance and Sexual Activity   Drug Use No     Social History     Tobacco Use   Smoking Status Former Smoker    Packs/day: 1 00    Years: 40 00    Pack years: 40 00    Types: Cigarettes    Quit date:     Years since quittin 2   Smokeless Tobacco Never Used     E-Cigarette Use: Never User     E-Cigarette/Vaping Substances    Nicotine No     THC No     CBD No     Flavoring No     Other No     Unknown No        Family History:   Family History   Problem Relation Age of Onset    Heart block Family     Coronary artery disease Mother     Thrombosis Mother     Hypertension Mother     Arthritis Mother     Hyperlipidemia Mother     Diabetes Father     Hypertension Father     Arthritis Father     Sudden death Father         cardiac    Colon cancer Paternal Grandfather     Breast cancer Sister     Heart disease Brother         Coronary stents       Meds/Allergies   PTA meds:   Prior to Admission Medications   Prescriptions Last Dose Informant Patient Reported? Taking?    Multiple Vitamins-Minerals (ICAPS AREDS 2) CAPS  Self Yes No   Sig: Take 1 capsule by mouth daily   ascorbic acid (VITAMIN C) 500 mg tablet  Self No No   Sig: Take 1 tablet (500 mg total) by mouth 2 (two) times a day   Patient not taking: Reported on 2021   aspirin 81 MG tablet  Self Yes No   Sig: Take 1 tablet (81 mg total) by mouth daily   atorvastatin (LIPITOR) 40 mg tablet  Self No No   Sig: Take 1 tablet (40 mg total) by mouth daily   diphenoxylate-atropine (LOMOTIL) 2 5-0 025 mg per tablet   No No   Sig: Take 1 tablet by mouth 4 (four) times a day as needed for diarrhea   ferrous sulfate 324 MG TBEC  Self Yes No   Sig: Take 324 mg by mouth   furosemide (LASIX) 20 mg tablet  Self No No   Sig: TAKE 1 TABLET BY MOUTH TWICE A DAY   lisinopril (ZESTRIL) 20 mg tablet  Self No No   Sig: TAKE 1 TABLET BY MOUTH EVERY DAY   mesalamine (ROWASA) 4 g   No No   Sig: Insert 60 mL (4 g total) into the rectum daily at bedtime   metFORMIN (GLUCOPHAGE-XR) 500 mg 24 hr tablet   No No   Sig: TAKE 1 TABLET BY MOUTH EVERY DAY WITH DINNER   metoprolol succinate (TOPROL-XL) 100 mg 24 hr tablet   No No   Sig: TAKE 1 TABLET BY MOUTH EVERY DAY   nitroglycerin (NITROSTAT) 0 3 mg SL tablet  Self Yes No   Sig: Nitrostat 0 3 mg sublingual tablet   Place 1 tablet as needed by sublingual route as needed for 90 days  omeprazole (PriLOSEC) 40 MG capsule  Self No No   Sig: Take 1 capsule (40 mg total) by mouth daily      Facility-Administered Medications: None     Allergies   Allergen Reactions    Ceftin [Cefuroxime] Itching     However, has tolerated Cefazolin and Pip-Tazo since, which have different side chains  Be cautious with / avoid 2nd, 3rd, or 4th Gen Cephs that have similar side chains to Cefuroxime         Objective   First Vitals:   Blood Pressure: (!) 171/72 (04/06/21 0631)  Pulse: 77 (04/06/21 0631)  Temperature: 98 °F (36 7 °C) (04/06/21 0631)  Temp Source: Temporal (04/06/21 0631)  Respirations: 20 (04/06/21 0631)  Height: 5' 11" (180 3 cm) (04/06/21 1440)  Weight - Scale: 76 2 kg (168 lb) (04/06/21 0631)  SpO2: (!) 88 % (04/06/21 0631)    Current Vitals:   Blood Pressure: 145/68 (04/07/21 0805)  Pulse: 56 (04/07/21 0805)  Temperature: (!) 96 7 °F (35 9 °C)(room cool) (04/07/21 0805)  Temp Source: Axillary (04/07/21 0805)  Respirations: 18 (04/07/21 0805)  Height: 5' 11" (180 3 cm) (04/06/21 1440)  Weight - Scale: 76 7 kg (169 lb 1 5 oz) (04/07/21 0600)  SpO2: 93 % (04/07/21 0805)    No intake or output data in the 24 hours ending 04/07/21 1525    Invasive Devices     Peripheral Intravenous Line            Peripheral IV 04/06/21 Right Antecubital 1 day                Physical Exam  Vitals signs and nursing note reviewed  Constitutional:       Appearance: He is not ill-appearing  HENT:      Nose: No congestion or rhinorrhea  Eyes:      General: No scleral icterus  Right eye: No discharge  Left eye: No discharge  Neck:      Musculoskeletal: No neck rigidity  Cardiovascular:      Rate and Rhythm: Normal rate and regular rhythm  Comments: Rare extrasystole  Pulmonary:      Breath sounds: No stridor  Wheezing present  Comments: some end exp wheezes  In right base  Abdominal:      General: Abdomen is flat  Palpations: Abdomen is soft  Tenderness: There is no abdominal tenderness  Lymphadenopathy:      Cervical: No cervical adenopathy  Neurological:      Mental Status: He is alert            reviewed labs    Code Status: Level 1 - Full Code  Advance Directive and Living Will:      Power of :    POLST:      Counseling / Coordination of Care:   None

## 2021-04-08 LAB
ALBUMIN SERPL BCP-MCNC: 2.5 G/DL (ref 3.5–5)
ALP SERPL-CCNC: 106 U/L (ref 46–116)
ALT SERPL W P-5'-P-CCNC: 24 U/L (ref 12–78)
ANION GAP SERPL CALCULATED.3IONS-SCNC: 12 MMOL/L (ref 4–13)
AST SERPL W P-5'-P-CCNC: 40 U/L (ref 5–45)
BILIRUB SERPL-MCNC: 0.7 MG/DL (ref 0.2–1)
BUN SERPL-MCNC: 21 MG/DL (ref 5–25)
CALCIUM ALBUM COR SERPL-MCNC: 9.4 MG/DL (ref 8.3–10.1)
CALCIUM SERPL-MCNC: 8.2 MG/DL (ref 8.3–10.1)
CHLORIDE SERPL-SCNC: 102 MMOL/L (ref 100–108)
CO2 SERPL-SCNC: 25 MMOL/L (ref 21–32)
CREAT SERPL-MCNC: 1.06 MG/DL (ref 0.6–1.3)
D DIMER PPP FEU-MCNC: 1.14 UG/ML FEU
D DIMER PPP FEU-MCNC: 1.41 UG/ML FEU
ERYTHROCYTE [DISTWIDTH] IN BLOOD BY AUTOMATED COUNT: 18.8 % (ref 11.6–15.1)
GFR SERPL CREATININE-BSD FRML MDRD: 69 ML/MIN/1.73SQ M
GLUCOSE SERPL-MCNC: 109 MG/DL (ref 65–140)
GLUCOSE SERPL-MCNC: 126 MG/DL (ref 65–140)
GLUCOSE SERPL-MCNC: 156 MG/DL (ref 65–140)
GLUCOSE SERPL-MCNC: 205 MG/DL (ref 65–140)
GLUCOSE SERPL-MCNC: 212 MG/DL (ref 65–140)
GLUCOSE SERPL-MCNC: 215 MG/DL (ref 65–140)
HCT VFR BLD AUTO: 35.7 % (ref 36.5–49.3)
HGB BLD-MCNC: 10.5 G/DL (ref 12–17)
MCH RBC QN AUTO: 23.8 PG (ref 26.8–34.3)
MCHC RBC AUTO-ENTMCNC: 29.4 G/DL (ref 31.4–37.4)
MCV RBC AUTO: 81 FL (ref 82–98)
PLATELET # BLD AUTO: 325 THOUSANDS/UL (ref 149–390)
POTASSIUM SERPL-SCNC: 3.8 MMOL/L (ref 3.5–5.3)
PROCALCITONIN SERPL-MCNC: 0.2 NG/ML
PROT SERPL-MCNC: 6.2 G/DL (ref 6.4–8.2)
RBC # BLD AUTO: 4.41 MILLION/UL (ref 3.88–5.62)
SODIUM SERPL-SCNC: 139 MMOL/L (ref 136–145)
WBC # BLD AUTO: 19.72 THOUSAND/UL (ref 4.31–10.16)

## 2021-04-08 PROCEDURE — 85379 FIBRIN DEGRADATION QUANT: CPT | Performed by: PHYSICIAN ASSISTANT

## 2021-04-08 PROCEDURE — 82948 REAGENT STRIP/BLOOD GLUCOSE: CPT

## 2021-04-08 PROCEDURE — 85379 FIBRIN DEGRADATION QUANT: CPT | Performed by: INTERNAL MEDICINE

## 2021-04-08 PROCEDURE — 99232 SBSQ HOSP IP/OBS MODERATE 35: CPT | Performed by: INTERNAL MEDICINE

## 2021-04-08 PROCEDURE — 85027 COMPLETE CBC AUTOMATED: CPT | Performed by: PHYSICIAN ASSISTANT

## 2021-04-08 PROCEDURE — 84145 PROCALCITONIN (PCT): CPT | Performed by: PHYSICIAN ASSISTANT

## 2021-04-08 PROCEDURE — 80053 COMPREHEN METABOLIC PANEL: CPT | Performed by: PHYSICIAN ASSISTANT

## 2021-04-08 RX ORDER — SACCHAROMYCES BOULARDII 250 MG
250 CAPSULE ORAL 2 TIMES DAILY
Status: DISCONTINUED | OUTPATIENT
Start: 2021-04-08 | End: 2021-04-10 | Stop reason: HOSPADM

## 2021-04-08 RX ADMIN — FUROSEMIDE 20 MG: 20 TABLET ORAL at 09:56

## 2021-04-08 RX ADMIN — ASPIRIN 81 MG: 81 TABLET, CHEWABLE ORAL at 09:55

## 2021-04-08 RX ADMIN — DOXYCYCLINE 100 MG: 100 CAPSULE ORAL at 09:56

## 2021-04-08 RX ADMIN — FERROUS SULFATE TAB 325 MG (65 MG ELEMENTAL FE) 325 MG: 325 (65 FE) TAB at 09:56

## 2021-04-08 RX ADMIN — Medication 250 MG: at 21:52

## 2021-04-08 RX ADMIN — DEXAMETHASONE SODIUM PHOSPHATE 6 MG: 4 INJECTION, SOLUTION INTRA-ARTICULAR; INTRALESIONAL; INTRAMUSCULAR; INTRAVENOUS; SOFT TISSUE at 09:56

## 2021-04-08 RX ADMIN — ZINC SULFATE 220 MG (50 MG) CAPSULE 220 MG: CAPSULE at 09:55

## 2021-04-08 RX ADMIN — INSULIN LISPRO 2 UNITS: 100 INJECTION, SOLUTION INTRAVENOUS; SUBCUTANEOUS at 18:07

## 2021-04-08 RX ADMIN — PANTOPRAZOLE SODIUM 40 MG: 40 TABLET, DELAYED RELEASE ORAL at 05:31

## 2021-04-08 RX ADMIN — INSULIN LISPRO 1 UNITS: 100 INJECTION, SOLUTION INTRAVENOUS; SUBCUTANEOUS at 21:54

## 2021-04-08 RX ADMIN — Medication 2000 UNITS: at 09:56

## 2021-04-08 RX ADMIN — OXYCODONE HYDROCHLORIDE AND ACETAMINOPHEN 1000 MG: 500 TABLET ORAL at 21:52

## 2021-04-08 RX ADMIN — ENOXAPARIN SODIUM 40 MG: 40 INJECTION SUBCUTANEOUS at 09:56

## 2021-04-08 RX ADMIN — DOXYCYCLINE 100 MG: 100 CAPSULE ORAL at 21:52

## 2021-04-08 RX ADMIN — FUROSEMIDE 20 MG: 20 TABLET ORAL at 18:08

## 2021-04-08 RX ADMIN — REMDESIVIR 100 MG: 100 INJECTION, POWDER, LYOPHILIZED, FOR SOLUTION INTRAVENOUS at 11:37

## 2021-04-08 RX ADMIN — ATORVASTATIN CALCIUM 40 MG: 40 TABLET, FILM COATED ORAL at 09:55

## 2021-04-08 RX ADMIN — LISINOPRIL 20 MG: 20 TABLET ORAL at 09:56

## 2021-04-08 RX ADMIN — METOPROLOL SUCCINATE 100 MG: 50 TABLET, EXTENDED RELEASE ORAL at 09:55

## 2021-04-08 RX ADMIN — OXYCODONE HYDROCHLORIDE AND ACETAMINOPHEN 1000 MG: 500 TABLET ORAL at 09:56

## 2021-04-08 NOTE — PLAN OF CARE
Problem: Potential for Falls  Goal: Patient will remain free of falls  Description: INTERVENTIONS:  - Assess patient frequently for physical needs  -  Identify cognitive and physical deficits and behaviors that affect risk of falls    -  [place] fall precautions as indicated by assessment   - Educate patient/family on patient safety including physical limitations  - Instruct patient to call for assistance with activity based on assessment  - Modify environment to reduce risk of injury  - Consider OT/PT consult to assist with strengthening/mobility  Outcome: Progressing     Problem: PAIN - ADULT  Goal: Verbalizes/displays adequate comfort level or baseline comfort level  Description: Interventions:  - Encourage patient to monitor pain and request assistance  - Assess pain using appropriate pain scale  - Administer analgesics based on type and severity of pain and evaluate response  - Implement non-pharmacological measures as appropriate and evaluate response  - Consider cultural and social influences on pain and pain management  - Notify physician/advanced practitioner if interventions unsuccessful or patient reports new pain  Outcome: Progressing     Problem: INFECTION - ADULT  Goal: Absence or prevention of progression during hospitalization  Description: INTERVENTIONS:  - Assess and monitor for signs and symptoms of infection  - Monitor lab/diagnostic results  - Monitor all insertion sites, i e  indwelling lines, tubes, and drains  - Monitor endotracheal if appropriate and nasal secretions for changes in amount and color  - [place] appropriate cooling/warming therapies per order  - Administer medications as ordered  - Instruct and encourage patient and family to use good hand hygiene technique  - Identify and instruct in appropriate isolation precautions for identified infection/condition  Outcome: Progressing  Goal: Absence of fever/infection during neutropenic period  Description: INTERVENTIONS:  - Monitor WBC    Outcome: Progressing     Problem: SAFETY ADULT  Goal: Patient will remain free of falls  Description: INTERVENTIONS:  - Assess patient frequently for physical needs  -  Identify cognitive and physical deficits and behaviors that affect risk of falls    -  [place] fall precautions as indicated by assessment   - Educate patient/family on patient safety including physical limitations  - Instruct patient to call for assistance with activity based on assessment  - Modify environment to reduce risk of injury  - Consider OT/PT consult to assist with strengthening/mobility  Outcome: Progressing  Goal: Maintain or return to baseline ADL function  Description: INTERVENTIONS:  -  Assess patient's ability to carry out ADLs; assess patient's baseline for ADL function and identify physical deficits which impact ability to perform ADLs (bathing, care of mouth/teeth, toileting, grooming, dressing, etc )  - Assess/evaluate cause of self-care deficits   - Assess range of motion  - Assess patient's mobility; develop plan if impaired  - Assess patient's need for assistive devices and provide as appropriate  - Encourage maximum independence but intervene and supervise when necessary  - Involve family in performance of ADLs  - Assess for home care needs following discharge   - Consider OT consult to assist with ADL evaluation and planning for discharge  - Provide patient education as appropriate  Outcome: Progressing  Goal: Maintain or return mobility status to optimal level  Description: INTERVENTIONS:  - Assess patient's baseline mobility status (ambulation, transfers, stairs, etc )    - Identify cognitive and physical deficits and behaviors that affect mobility  - Identify mobility aids required to assist with transfers and/or ambulation (gait belt, sit-to-stand, lift, walker, cane, etc )  - [place] fall precautions as indicated by assessment  - Record patient progress and toleration of activity level on Mobility SBAR; progress patient to next Phase/Stage  - Instruct patient to call for assistance with activity based on assessment  - Consider rehabilitation consult to assist with strengthening/weightbearing, etc   Outcome: Progressing     Problem: DISCHARGE PLANNING  Goal: Discharge to home or other facility with appropriate resources  Description: INTERVENTIONS:  - Identify barriers to discharge w/patient and caregiver  - Arrange for needed discharge resources and transportation as appropriate  - Identify discharge learning needs (meds, wound care, etc )  - Arrange for interpretive services to assist at discharge as needed  - Refer to Case Management Department for coordinating discharge planning if the patient needs post-hospital services based on physician/advanced practitioner order or complex needs related to functional status, cognitive ability, or social support system  Outcome: Progressing     Problem: Knowledge Deficit  Goal: Patient/family/caregiver demonstrates understanding of disease process, treatment plan, medications, and discharge instructions  Description: Complete learning assessment and assess knowledge base    Interventions:  - Provide teaching at level of understanding  - Provide teaching via preferred learning methods  Outcome: Progressing

## 2021-04-08 NOTE — PROGRESS NOTES
Progress Note - Hetal Hastings 77 y o  male MRN: [id number]    Unit/Bed#: -01 Encounter: [id number]      Assessment:  Principal Problem:    Pneumonia due to COVID-19 virus  Active Problems:    Chronic diastolic congestive heart failure (HCC)    Type 2 diabetes mellitus without complication, without long-term current use of insulin (HCC)    Dyslipidemia    Malignant tumor of cecum (HCC)    Multiple myeloma not having achieved remission (HCC)    Hx of CABG    Essential hypertension    History of aortic valve replacement with bioprosthetic valve    Paroxysmal atrial fibrillation (HCC)    Roberts's esophagus without dysplasia    Fatty liver  Resolved Problems:    * No resolved hospital problems  *        Plan:  · COVID-19 related pneumonia-currently weaning oxygen to 1 L with sats at 93%-patient with less cough and congestion today  Day 3  Of IV steroids and Rinne disc severe-on empiric antibiotic with oral doxycycline-D-dimer 1 41 on admit recheck labs in the a m  · Chronic diastolic CHF-no signs of decompensation  · Leukocytosis 19,007 20 May be in part related to steroid  · Hypertension-stable blood pressure 139/66 this a m  Continue on Lasix 20 mg daily, lisinopril 20 mg, and metoprolol 100 mg daily  · History of aortic root valve replacement bioprosthetic valve  · PAF-not on anticoagulation due to prior GI bleed issues esophagitis and prior colon cancer    Subjective:   Patient reports he is feeling somewhat better today  No nausea or vomiting  Denies diarrhea  Denies lightheadedness  Has occasional cough but no discolored sputum currently  ROS  Comprehensive system review negative other than noted above    Objective:     Vitals: Blood pressure 139/66, pulse 57, temperature 97 5 °F (36 4 °C), resp  rate 22, height 5' 11" (1 803 m), weight 77 kg (169 lb 11 2 oz), SpO2 93 %  ,Body mass index is 23 67 kg/m²    Current Facility-Administered Medications   Medication Dose Route Frequency Provider Last Rate Last Admin    ascorbic acid (VITAMIN C) tablet 1,000 mg  1,000 mg Oral Q12H [facility] Nikia Fly, DO   1,000 mg at 04/08/21 0956    aspirin chewable tablet 81 mg  81 mg Oral Daily Nikia Fly, DO   81 mg at 04/08/21 0955    atorvastatin (LIPITOR) tablet 40 mg  40 mg Oral Daily Nikia Fly, DO   40 mg at 04/08/21 0955    cholecalciferol (VITAMIN D3) tablet 2,000 Units  2,000 Units Oral Daily Nikia Fly, DO   2,000 Units at 04/08/21 0956    dexamethasone (DECADRON) injection 6 mg  6 mg Intravenous Q24H Nikia Fly, DO   6 mg at 04/08/21 0956    diphenoxylate-atropine (LOMOTIL) 2 5-0 025 mg per tablet 1 tablet  1 tablet Oral 4x Daily PRN Gauri Bush,         doxycycline hyclate (VIBRAMYCIN) capsule 100 mg  100 mg Oral Q12H [facility] Nikia Fly, DO   100 mg at 04/08/21 0956    enoxaparin (LOVENOX) subcutaneous injection 40 mg  40 mg Subcutaneous Daily Nikia Fly, DO   40 mg at 04/08/21 0956    ferrous sulfate tablet 325 mg  325 mg Oral Daily With Breakfast Nikia Fly, DO   325 mg at 04/08/21 0956    furosemide (LASIX) tablet 20 mg  20 mg Oral BID Nikia Fly, DO   20 mg at 04/08/21 0956    insulin lispro (HumaLOG) 100 units/mL subcutaneous injection 1-5 Units  1-5 Units Subcutaneous TID AC Nikia Fly, DO        insulin lispro (HumaLOG) 100 units/mL subcutaneous injection 1-5 Units  1-5 Units Subcutaneous HS Nikia Fly, DO   1 Units at 04/07/21 2144    lisinopril (ZESTRIL) tablet 20 mg  20 mg Oral Daily Nikia Fly, DO   20 mg at 04/08/21 0956    mesalamine (ROWASA) enema 4 g  4 g Rectal HS Nikia Fly, DO        metoprolol succinate (TOPROL-XL) 24 hr tablet 100 mg  100 mg Oral Daily Nikia Fly, DO   100 mg at 04/08/21 0955    zinc sulfate (ZINCATE) capsule 220 mg  220 mg Oral Daily Nikia Fly, DO   220 mg at 04/08/21 0054    Followed by   Gerry Flynn ON 4/13/2021] multivitamin-minerals (CENTRUM ADULTS) tablet 1 tablet  1 tablet Oral Daily Nikia ePrez DO        nitroglycerin (NITROSTAT) SL tablet 0 3 mg  0 3 mg Sublingual Q5 Min PRN Gauri Bush DO        pantoprazole (PROTONIX) EC tablet 40 mg  40 mg Oral Early Morning Nikia Fly, DO   40 mg at 04/08/21 0531    remdesivir (Veklury) 100 mg in sodium chloride 0 9 % 250 mL IVPB  100 mg Intravenous Q24H Nikia Fly, DO 0 mL/hr at 04/07/21 1537 100 mg at 04/08/21 1137     Medications Prior to Admission   Medication    ascorbic acid (VITAMIN C) 500 mg tablet    aspirin 81 MG tablet    atorvastatin (LIPITOR) 40 mg tablet    diphenoxylate-atropine (LOMOTIL) 2 5-0 025 mg per tablet    ferrous sulfate 324 MG TBEC    furosemide (LASIX) 20 mg tablet    lisinopril (ZESTRIL) 20 mg tablet    mesalamine (ROWASA) 4 g    metFORMIN (GLUCOPHAGE-XR) 500 mg 24 hr tablet    metoprolol succinate (TOPROL-XL) 100 mg 24 hr tablet    Multiple Vitamins-Minerals (ICAPS AREDS 2) CAPS    nitroglycerin (NITROSTAT) 0 3 mg SL tablet    omeprazole (PriLOSEC) 40 MG capsule       No intake or output data in the 24 hours ending 04/08/21 1233    Physical Exam:  General appearance: alert and cooperative  Neck: no adenopathy, no JVD and thyroid not enlarged, symmetric, no tenderness/mass/nodules  Lungs:  Minimal rhonchi in the left base  No accessory muscle use  Heart: regular rate and rhythm, S1, S2 normal, no murmur, click, rub or gallop  Abdomen: soft, non-tender; bowel sounds normal; no masses,  no organomegaly  Extremities: extremities normal, warm and well-perfused; no cyanosis, clubbing, or edema  Skin: Skin color, texture, turgor normal  No rashes or lesions  Neurologic:  No tremors  No focal motor deficit       Lab, Imaging and other studies: I have personally reviewed pertinent reports            Results from last 7 days   Lab Units 04/08/21  0439 04/07/21  0300 04/06/21  0645   WBC Thousand/uL 19 72* 6 15 11 35*   HEMOGLOBIN g/dL 10 5* 9 7* 10 4*   HEMATOCRIT % 35 7* 31 8* 34 4*   NEUTROS PCT %  --  79* 82*   LYMPHS PCT %  --  13* 11*   MONOS PCT %  --  7 4   EOS PCT %  --  0 2     Results from last 7 days   Lab Units 04/08/21  0439 04/07/21  0300 04/06/21  1957 04/06/21  0645   POTASSIUM mmol/L 3 8 3 6 3 4* 2 9*   CHLORIDE mmol/L 102 103  --  99*   CO2 mmol/L 25 28  --  30   BUN mg/dL 21 16  --  12   CREATININE mg/dL 1 06 0 98  --  1 26   CALCIUM mg/dL 8 2* 7 7*  --  8 2*   ALK PHOS U/L 106 94  --  126*   ALT U/L 24 21  --  26   AST U/L 40 37  --  54*     Lab Results   Component Value Date    TROPONINI 0 04 04/06/2021    TROPONINI 0 05 (H) 04/06/2021    TROPONINI 0 05 (H) 04/06/2021    CKMB 1 7 04/07/2021    CKMB 1 9 04/06/2021    CKTOTAL 193 04/07/2021    CKTOTAL 341 (H) 04/06/2021     Results from last 7 days   Lab Units 04/06/21  0645   INR  1 12     Lab Results   Component Value Date    BLOODCX No Growth at 48 hrs  04/06/2021    BLOODCX No Growth at 48 hrs  04/06/2021    BLOODCX No Growth After 5 Days  04/01/2021       Imaging:  Results for orders placed during the hospital encounter of 04/06/21   XR chest 1 view portable    Narrative CHEST     INDICATION:   covid, sob  Patient has confirmed COVID-19  Positive on 4/1/2021  COMPARISON:  Chest radiograph from 9/11/2019 and chest CT from 3/26/2021  EXAM PERFORMED/VIEWS:  XR CHEST PORTABLE  FINDINGS:    Cardiomediastinal silhouette normal  AVR  Extensive bilateral groundglass opacity, greater on the right  No effusion or pneumothorax  Mild degenerative disease in the spine and involving both glenohumeral joints  Impression Extensive bilateral ground was opacity, greater on the right, due to Covid 19 pneumonia  Workstation performed: [id number]       Results for orders placed during the hospital encounter of 09/11/19   XR chest 2 views    Narrative CHEST     INDICATION:   Chest Pain  COMPARISON:  8/20/2019  EXAM PERFORMED/VIEWS:  XR CHEST PA & LATERAL  The frontal view was performed utilizing dual energy radiographic technique  Images: 4    FINDINGS:  There are median sternotomy wires indicating prior cardiac surgery      Cardiomediastinal silhouette appears unremarkable  The lungs are clear  No pneumothorax or pleural effusion  Osseous structures appear within normal limits for patient age  Impression No acute cardiopulmonary disease  Workstation performed: [id number]         PATIENT CENTERED ROUNDS: I have performed rounds with the nursing staff        Left message on wife answering machine to update her on condition- [phone], DO

## 2021-04-09 LAB
ALBUMIN SERPL BCP-MCNC: 2.4 G/DL (ref 3.5–5)
ALP SERPL-CCNC: 97 U/L (ref 46–116)
ALT SERPL W P-5'-P-CCNC: 26 U/L (ref 12–78)
ANION GAP SERPL CALCULATED.3IONS-SCNC: 13 MMOL/L (ref 4–13)
AST SERPL W P-5'-P-CCNC: 31 U/L (ref 5–45)
BILIRUB SERPL-MCNC: 0.7 MG/DL (ref 0.2–1)
BUN SERPL-MCNC: 20 MG/DL (ref 5–25)
CALCIUM ALBUM COR SERPL-MCNC: 9.1 MG/DL (ref 8.3–10.1)
CALCIUM SERPL-MCNC: 7.8 MG/DL (ref 8.3–10.1)
CHLORIDE SERPL-SCNC: 104 MMOL/L (ref 100–108)
CO2 SERPL-SCNC: 27 MMOL/L (ref 21–32)
CREAT SERPL-MCNC: 0.89 MG/DL (ref 0.6–1.3)
ERYTHROCYTE [DISTWIDTH] IN BLOOD BY AUTOMATED COUNT: 18.7 % (ref 11.6–15.1)
GFR SERPL CREATININE-BSD FRML MDRD: 84 ML/MIN/1.73SQ M
GLUCOSE SERPL-MCNC: 116 MG/DL (ref 65–140)
GLUCOSE SERPL-MCNC: 121 MG/DL (ref 65–140)
GLUCOSE SERPL-MCNC: 174 MG/DL (ref 65–140)
GLUCOSE SERPL-MCNC: 186 MG/DL (ref 65–140)
GLUCOSE SERPL-MCNC: 201 MG/DL (ref 65–140)
HCT VFR BLD AUTO: 32.4 % (ref 36.5–49.3)
HGB BLD-MCNC: 9.8 G/DL (ref 12–17)
MCH RBC QN AUTO: 23.7 PG (ref 26.8–34.3)
MCHC RBC AUTO-ENTMCNC: 30.2 G/DL (ref 31.4–37.4)
MCV RBC AUTO: 78 FL (ref 82–98)
PLATELET # BLD AUTO: 211 THOUSANDS/UL (ref 149–390)
POTASSIUM SERPL-SCNC: 3 MMOL/L (ref 3.5–5.3)
PROCALCITONIN SERPL-MCNC: 0.14 NG/ML
PROT SERPL-MCNC: 5.8 G/DL (ref 6.4–8.2)
RBC # BLD AUTO: 4.14 MILLION/UL (ref 3.88–5.62)
SODIUM SERPL-SCNC: 144 MMOL/L (ref 136–145)
WBC # BLD AUTO: 13.73 THOUSAND/UL (ref 4.31–10.16)

## 2021-04-09 PROCEDURE — 85027 COMPLETE CBC AUTOMATED: CPT | Performed by: INTERNAL MEDICINE

## 2021-04-09 PROCEDURE — 84145 PROCALCITONIN (PCT): CPT | Performed by: PHYSICIAN ASSISTANT

## 2021-04-09 PROCEDURE — 82948 REAGENT STRIP/BLOOD GLUCOSE: CPT

## 2021-04-09 PROCEDURE — 99232 SBSQ HOSP IP/OBS MODERATE 35: CPT | Performed by: INTERNAL MEDICINE

## 2021-04-09 PROCEDURE — 80053 COMPREHEN METABOLIC PANEL: CPT | Performed by: INTERNAL MEDICINE

## 2021-04-09 RX ADMIN — PANTOPRAZOLE SODIUM 40 MG: 40 TABLET, DELAYED RELEASE ORAL at 05:40

## 2021-04-09 RX ADMIN — Medication 250 MG: at 08:43

## 2021-04-09 RX ADMIN — REMDESIVIR 100 MG: 100 INJECTION, POWDER, LYOPHILIZED, FOR SOLUTION INTRAVENOUS at 12:09

## 2021-04-09 RX ADMIN — FERROUS SULFATE TAB 325 MG (65 MG ELEMENTAL FE) 325 MG: 325 (65 FE) TAB at 08:43

## 2021-04-09 RX ADMIN — DEXAMETHASONE SODIUM PHOSPHATE 6 MG: 4 INJECTION, SOLUTION INTRA-ARTICULAR; INTRALESIONAL; INTRAMUSCULAR; INTRAVENOUS; SOFT TISSUE at 08:43

## 2021-04-09 RX ADMIN — DOXYCYCLINE 100 MG: 100 CAPSULE ORAL at 08:43

## 2021-04-09 RX ADMIN — METOPROLOL SUCCINATE 100 MG: 50 TABLET, EXTENDED RELEASE ORAL at 08:43

## 2021-04-09 RX ADMIN — INSULIN LISPRO 1 UNITS: 100 INJECTION, SOLUTION INTRAVENOUS; SUBCUTANEOUS at 12:15

## 2021-04-09 RX ADMIN — OXYCODONE HYDROCHLORIDE AND ACETAMINOPHEN 1000 MG: 500 TABLET ORAL at 21:30

## 2021-04-09 RX ADMIN — LISINOPRIL 20 MG: 20 TABLET ORAL at 08:43

## 2021-04-09 RX ADMIN — OXYCODONE HYDROCHLORIDE AND ACETAMINOPHEN 1000 MG: 500 TABLET ORAL at 08:42

## 2021-04-09 RX ADMIN — DOXYCYCLINE 100 MG: 100 CAPSULE ORAL at 21:30

## 2021-04-09 RX ADMIN — INSULIN LISPRO 1 UNITS: 100 INJECTION, SOLUTION INTRAVENOUS; SUBCUTANEOUS at 21:31

## 2021-04-09 RX ADMIN — ENOXAPARIN SODIUM 40 MG: 40 INJECTION SUBCUTANEOUS at 08:44

## 2021-04-09 RX ADMIN — INSULIN LISPRO 1 UNITS: 100 INJECTION, SOLUTION INTRAVENOUS; SUBCUTANEOUS at 17:29

## 2021-04-09 RX ADMIN — FUROSEMIDE 20 MG: 20 TABLET ORAL at 17:24

## 2021-04-09 RX ADMIN — Medication 2000 UNITS: at 08:43

## 2021-04-09 RX ADMIN — ZINC SULFATE 220 MG (50 MG) CAPSULE 220 MG: CAPSULE at 08:42

## 2021-04-09 RX ADMIN — Medication 250 MG: at 17:24

## 2021-04-09 RX ADMIN — ASPIRIN 81 MG: 81 TABLET, CHEWABLE ORAL at 08:42

## 2021-04-09 RX ADMIN — ATORVASTATIN CALCIUM 40 MG: 40 TABLET, FILM COATED ORAL at 08:43

## 2021-04-09 RX ADMIN — FUROSEMIDE 20 MG: 20 TABLET ORAL at 08:42

## 2021-04-09 NOTE — ASSESSMENT & PLAN NOTE
Wt Readings from Last 3 Encounters:   04/09/21 76 9 kg (169 lb 9 6 oz)   04/02/21 79 4 kg (175 lb)   04/01/21 79 4 kg (175 lb)     Stable on current meds lasix 20 mg bid

## 2021-04-09 NOTE — ASSESSMENT & PLAN NOTE
- some ongoing cough- has some blood streaked sputum this am- is due for an outpatient pet scan at [facility] where he follows with oncology team    Less sob in general- now on room air  Will monitor sats overnight   if continued improvement will consider for discharge in 24- 48 hours   will switch to po steroids

## 2021-04-09 NOTE — ASSESSMENT & PLAN NOTE
Lab Results   Component Value Date    HGBA1C 5 8 (H) 12/30/2020       Recent Labs     04/08/21  1124 04/08/21  1626 04/08/21  2123 04/09/21  0807   POCGLU 156* 215* 205* 121     Elevated readings in setting of  Iv steroids and acute infection- continue on coverage  Blood Sugar Average: Last 72 hrs:  (P) 162 1

## 2021-04-09 NOTE — PROGRESS NOTES
[place]  Progress Note - [place] 1945, 77 y o  male MRN: [id number]  Unit/Bed#: -01 Encounter: [id number]  Primary Care Provider: Arnulfo Cedillo DO   Date and time admitted to hospital: 4/6/2021  6:31 AM    Chronic diastolic congestive heart failure ([facility] [address] )  Assessment & Plan  Wt Readings from Last 3 Encounters:   04/09/21 76 9 kg (169 lb 9 6 oz)   04/02/21 79 4 kg (175 lb)   04/01/21 79 4 kg (175 lb)     Stable on current meds lasix 20 mg bid    * Pneumonia due to COVID-19 virus  Assessment & Plan  - some ongoing cough- has some blood streaked sputum this am- is due for an outpatient pet scan at [facility] where he follows with oncology team    Less sob in general- now on room air  Will monitor sats overnight   if continued improvement will consider for discharge in 24- 48 hours   will switch to po steroids    [facility]  Assessment & Plan  No signs of recurrence- contineu beta blockers    Essential hypertension  Assessment & Plan  bp 144 73 toady- continue on lisinopril and metoprolol and lasix    Malignant tumor of cecum ([facility] [address] )  Assessment & Plan  Had recent ct of chest abd pelvis with his oncology team at [facility]    Dyslipidemia  Assessment & Plan  On dietary meausres    Type 2 diabetes mellitus without complication, without long-term current use of insulin [facility])  Assessment & Plan  Lab Results   Component Value Date    HGBA1C 5 8 (H) 12/30/2020       Recent Labs     04/08/21  1124 04/08/21  1626 04/08/21  2123 04/09/21  0807   POCGLU 156* 215* 205* 121     Elevated readings in setting of  Iv steroids and acute infection- continue on coverage  Blood Sugar Average: Last 72 hrs:  (P) 162 1      VTE Prophylaxis: in place    Patient Centered Rounds: I rounded with patient's nurse    Current Length of Stay: 3 day(s)    Current Patient Status: Inpatient    Certification Statement: Pt requires additional inpatient hospital stay due to: see assessment and plan        Subjective:   No complaints of lightheadedness or dizziness  Some cough with blood streaked sputum this am- has had this a t home    All other ROS are negative    Objective:     Vitals:   Temp (24hrs), Av 7 °F (36 5 °C), Min:97 6 °F (36 4 °C), Max:97 8 °F (36 6 °C)    Temp:  [97 6 °F (36 4 °C)-97 8 °F (36 6 °C)] 97 6 °F (36 4 °C)  HR:  [56-67] 56  Resp:  [16-18] 18  BP: (131-144)/(62-73) 144/73  SpO2:  [91 %-98 %] 92 %  Body mass index is 23 65 kg/m²  Input and Output Summary (last 24 hours): Intake/Output Summary (Last 24 hours) at 2021 1051  Last data filed at 2021 0730  Gross per 24 hour   Intake 730 ml   Output --   Net 730 ml       Physical Exam:     Physical Exam  Vitals signs and nursing note reviewed  Constitutional:       General: He is not in acute distress  Eyes:      General: No scleral icterus  Right eye: No discharge  Left eye: No discharge  Neck:      Musculoskeletal: No neck rigidity or muscular tenderness  Cardiovascular:      Rate and Rhythm: Normal rate and regular rhythm  Pulmonary:      Breath sounds: No rales  Comments: Crackles in bases bilaterally  Abdominal:      General: There is no distension  Tenderness: There is no abdominal tenderness  Musculoskeletal:         General: No swelling or tenderness  Psychiatric:         Mood and Affect: Mood normal          Thought Content: Thought content normal              I personally reviewed labs and imaging reports for today        Last 24 Hours Medication List:   Current Facility-Administered Medications   Medication Dose Route Frequency Provider Last Rate    ascorbic acid  1,000 mg Oral Q12H [facility] Nikia Fly, DO      aspirin  81 mg Oral Daily Nikia Fly, DO      atorvastatin  40 mg Oral Daily [place], DO      cholecalciferol  2,000 Units Oral Daily Nikia Fly, DO      dexamethasone  6 mg Intravenous Q24H Nikia Fly, DO      diphenoxylate-atropine  1 tablet Oral 4x Daily PRN Dannielle Yousif,       doxycycline hyclate  100 mg Oral Q12H [facility] Nikia Fly, DO      enoxaparin  40 mg Subcutaneous Daily Dannielle Yousif,       ferrous sulfate  325 mg Oral Daily With Breakfast Nikia Fly, DO      furosemide  20 mg Oral BID Nikia Fly, DO      insulin lispro  1-5 Units Subcutaneous TID AC Nikia Fly, DO      insulin lispro  1-5 Units Subcutaneous HS Nikia Fly, DO      lisinopril  20 mg Oral Daily Dannielle Yousif, DO      mesalamine  4 g Rectal HS Nikia Fly, DO      metoprolol succinate  100 mg Oral Daily Nikia Fly, DO      zinc sulfate  220 mg Oral Daily Nikia Fly, DO      Followed by   Rosa Ramos ON 4/13/2021] multivitamin-minerals  1 tablet Oral Daily Nikia Fly, DO      nitroglycerin  0 3 mg Sublingual Q5 Min PRN Nikia Fly, DO      pantoprazole  40 mg Oral Early Morning Nikia Fly, DO      remdesivir  100 mg Intravenous Q24H Nikia Fly, DO 0 mg (04/07/21 1537)    saccharomyces boulardii  250 mg Oral BID Dannielle Yousif DO        I left message on answering machine for wife [phone]    Today, Patient Was Seen By: Dannielle Yousif DO    ** Please Note: Dictation voice to text software may have been used in the creation of this document   **

## 2021-04-09 NOTE — CASE MANAGEMENT
Plan for d/c home tomorrow  Stable on RA  Pt refusing HC  Wife will transport  No needs evaluated  IMM reviewed with patient   patient agree with discharge determination

## 2021-04-10 VITALS
OXYGEN SATURATION: 93 % | TEMPERATURE: 97.4 F | RESPIRATION RATE: 21 BRPM | WEIGHT: 169.31 LBS | HEART RATE: 59 BPM | SYSTOLIC BLOOD PRESSURE: 153 MMHG | BODY MASS INDEX: 23.7 KG/M2 | HEIGHT: 71 IN | DIASTOLIC BLOOD PRESSURE: 78 MMHG

## 2021-04-10 LAB
GLUCOSE SERPL-MCNC: 169 MG/DL (ref 65–140)
GLUCOSE SERPL-MCNC: 91 MG/DL (ref 65–140)

## 2021-04-10 PROCEDURE — 99238 HOSP IP/OBS DSCHRG MGMT 30/<: CPT | Performed by: INTERNAL MEDICINE

## 2021-04-10 PROCEDURE — 82948 REAGENT STRIP/BLOOD GLUCOSE: CPT

## 2021-04-10 RX ORDER — MELATONIN
2000 DAILY
Qty: 60 TABLET | Refills: 0 | Status: SHIPPED | OUTPATIENT
Start: 2021-04-11

## 2021-04-10 RX ORDER — MULTIVITAMIN/IRON/FOLIC ACID 18MG-0.4MG
1 TABLET ORAL DAILY
Refills: 0
Start: 2021-04-13

## 2021-04-10 RX ORDER — PREDNISONE 10 MG/1
TABLET ORAL
Qty: 30 TABLET | Refills: 0 | Status: SHIPPED | OUTPATIENT
Start: 2021-04-10 | End: 2021-04-20 | Stop reason: HOSPADM

## 2021-04-10 RX ORDER — SACCHAROMYCES BOULARDII 250 MG
250 CAPSULE ORAL 2 TIMES DAILY
Qty: 60 CAPSULE | Refills: 1 | Status: SHIPPED | OUTPATIENT
Start: 2021-04-10 | End: 2022-04-14

## 2021-04-10 RX ADMIN — OXYCODONE HYDROCHLORIDE AND ACETAMINOPHEN 1000 MG: 500 TABLET ORAL at 08:56

## 2021-04-10 RX ADMIN — FUROSEMIDE 20 MG: 20 TABLET ORAL at 08:56

## 2021-04-10 RX ADMIN — ASPIRIN 81 MG: 81 TABLET, CHEWABLE ORAL at 08:56

## 2021-04-10 RX ADMIN — PANTOPRAZOLE SODIUM 40 MG: 40 TABLET, DELAYED RELEASE ORAL at 05:36

## 2021-04-10 RX ADMIN — Medication 2000 UNITS: at 08:56

## 2021-04-10 RX ADMIN — FERROUS SULFATE TAB 325 MG (65 MG ELEMENTAL FE) 325 MG: 325 (65 FE) TAB at 08:56

## 2021-04-10 RX ADMIN — DEXAMETHASONE SODIUM PHOSPHATE 6 MG: 4 INJECTION, SOLUTION INTRA-ARTICULAR; INTRALESIONAL; INTRAMUSCULAR; INTRAVENOUS; SOFT TISSUE at 08:57

## 2021-04-10 RX ADMIN — ATORVASTATIN CALCIUM 40 MG: 40 TABLET, FILM COATED ORAL at 08:56

## 2021-04-10 RX ADMIN — ZINC SULFATE 220 MG (50 MG) CAPSULE 220 MG: CAPSULE at 08:57

## 2021-04-10 RX ADMIN — INSULIN LISPRO 1 UNITS: 100 INJECTION, SOLUTION INTRAVENOUS; SUBCUTANEOUS at 12:45

## 2021-04-10 RX ADMIN — LISINOPRIL 20 MG: 20 TABLET ORAL at 08:56

## 2021-04-10 RX ADMIN — Medication 250 MG: at 08:56

## 2021-04-10 RX ADMIN — ENOXAPARIN SODIUM 40 MG: 40 INJECTION SUBCUTANEOUS at 08:57

## 2021-04-10 RX ADMIN — REMDESIVIR 100 MG: 100 INJECTION, POWDER, LYOPHILIZED, FOR SOLUTION INTRAVENOUS at 11:20

## 2021-04-10 RX ADMIN — METOPROLOL SUCCINATE 100 MG: 50 TABLET, EXTENDED RELEASE ORAL at 08:56

## 2021-04-10 RX ADMIN — DOXYCYCLINE 100 MG: 100 CAPSULE ORAL at 09:37

## 2021-04-10 NOTE — PLAN OF CARE
Problem: Potential for Falls  Goal: Patient will remain free of falls  Description: INTERVENTIONS:  - Assess patient frequently for physical needs  -  Identify cognitive and physical deficits and behaviors that affect risk of falls    -  [place] fall precautions as indicated by assessment   - Educate patient/family on patient safety including physical limitations  - Instruct patient to call for assistance with activity based on assessment  - Modify environment to reduce risk of injury  - Consider OT/PT consult to assist with strengthening/mobility  Outcome: Progressing     Problem: PAIN - ADULT  Goal: Verbalizes/displays adequate comfort level or baseline comfort level  Description: Interventions:  - Encourage patient to monitor pain and request assistance  - Assess pain using appropriate pain scale  - Administer analgesics based on type and severity of pain and evaluate response  - Implement non-pharmacological measures as appropriate and evaluate response  - Consider cultural and social influences on pain and pain management  - Notify physician/advanced practitioner if interventions unsuccessful or patient reports new pain  Outcome: Progressing     Problem: INFECTION - ADULT  Goal: Absence or prevention of progression during hospitalization  Description: INTERVENTIONS:  - Assess and monitor for signs and symptoms of infection  - Monitor lab/diagnostic results  - Monitor all insertion sites, i e  indwelling lines, tubes, and drains  - Monitor endotracheal if appropriate and nasal secretions for changes in amount and color  - [place] appropriate cooling/warming therapies per order  - Administer medications as ordered  - Instruct and encourage patient and family to use good hand hygiene technique  - Identify and instruct in appropriate isolation precautions for identified infection/condition  Outcome: Progressing  Goal: Absence of fever/infection during neutropenic period  Description: INTERVENTIONS:  - Monitor WBC    Outcome: Progressing     Problem: SAFETY ADULT  Goal: Patient will remain free of falls  Description: INTERVENTIONS:  - Assess patient frequently for physical needs  -  Identify cognitive and physical deficits and behaviors that affect risk of falls    -  [place] fall precautions as indicated by assessment   - Educate patient/family on patient safety including physical limitations  - Instruct patient to call for assistance with activity based on assessment  - Modify environment to reduce risk of injury  - Consider OT/PT consult to assist with strengthening/mobility  Outcome: Progressing  Goal: Maintain or return to baseline ADL function  Description: INTERVENTIONS:  -  Assess patient's ability to carry out ADLs; assess patient's baseline for ADL function and identify physical deficits which impact ability to perform ADLs (bathing, care of mouth/teeth, toileting, grooming, dressing, etc )  - Assess/evaluate cause of self-care deficits   - Assess range of motion  - Assess patient's mobility; develop plan if impaired  - Assess patient's need for assistive devices and provide as appropriate  - Encourage maximum independence but intervene and supervise when necessary  - Involve family in performance of ADLs  - Assess for home care needs following discharge   - Consider OT consult to assist with ADL evaluation and planning for discharge  - Provide patient education as appropriate  Outcome: Progressing  Goal: Maintain or return mobility status to optimal level  Description: INTERVENTIONS:  - Assess patient's baseline mobility status (ambulation, transfers, stairs, etc )    - Identify cognitive and physical deficits and behaviors that affect mobility  - Identify mobility aids required to assist with transfers and/or ambulation (gait belt, sit-to-stand, lift, walker, cane, etc )  - [place] fall precautions as indicated by assessment  - Record patient progress and toleration of activity level on Mobility SBAR; progress patient to next Phase/Stage  - Instruct patient to call for assistance with activity based on assessment  - Consider rehabilitation consult to assist with strengthening/weightbearing, etc   Outcome: Progressing     Problem: DISCHARGE PLANNING  Goal: Discharge to home or other facility with appropriate resources  Description: INTERVENTIONS:  - Identify barriers to discharge w/patient and caregiver  - Arrange for needed discharge resources and transportation as appropriate  - Identify discharge learning needs (meds, wound care, etc )  - Arrange for interpretive services to assist at discharge as needed  - Refer to Case Management Department for coordinating discharge planning if the patient needs post-hospital services based on physician/advanced practitioner order or complex needs related to functional status, cognitive ability, or social support system  Outcome: Progressing     Problem: Knowledge Deficit  Goal: Patient/family/caregiver demonstrates understanding of disease process, treatment plan, medications, and discharge instructions  Description: Complete learning assessment and assess knowledge base    Interventions:  - Provide teaching at level of understanding  - Provide teaching via preferred learning methods  Outcome: Progressing

## 2021-04-10 NOTE — DISCHARGE SUMMARY
Discharge Summary - Jake Farmer 77 y o  male MRN: [id number]    Unit/Bed#: -01 Encounter: [id number]    Admission Date: 4/6/2021     Admitting Diagnosis: Shortness of breath [R06 02]  Hypokalemia [E87 6]  PVC (premature ventricular contraction) [I49 3]  Dyspnea [R06 00]  COVID-19 [U07 1]    HPI:  78-year-old male who tested positive for COVID on April 6, 2021 after receiving his 2nd COVID shot several days before  Patient reports increased cough fever and shortness of breath prompting him to come to the  Consults  Case management  Procedures Performed:   Orders Placed This Encounter   Procedures    ED ECG Documentation Only       Hospital Course:   Patient required O2 at 2 L on presentation to the ER and was placed on mild protocol with 5 day course of REM disc severe, IV steroids, and vitamin supplements  He showed gradual improvement in his shortness of breath and was felt to be stable for discharge to home on April 10, 2021  Did have some occasional blood streaking in his sputum but was improving at time of discharge and he is scheduled as an outpatient for a PET scan with his oncology team because of pulmonary nodules  Patient's O2 sats improved and he did not require to be discharged on oxygen supplementation      Significant Findings, Care, Treatment and Services Provided:  CHEST      INDICATION:   covid, sob  Patient has confirmed COVID-19  Positive on 4/1/2021      COMPARISON:  Chest radiograph from 9/11/2019 and chest CT from 3/26/2021      EXAM PERFORMED/VIEWS:  XR CHEST PORTABLE      FINDINGS:     Cardiomediastinal silhouette normal  AVR      Extensive bilateral groundglass opacity, greater on the right    No effusion or pneumothorax      Mild degenerative disease in the spine and involving both glenohumeral joints      IMPRESSION:     Extensive bilateral ground was opacity, greater on the right, due to Covid 19 pneumonia      General appearance: alert  Neck: no adenopathy, no JVD and thyroid not enlarged, symmetric, no tenderness/mass/nodules  Lung decreased breath sounds but no active wheezes  Heart: regular rate and rhythm, S1, S2 normal, no murmur, click, rub or gallop  Abdomen:  Nontender-no distension  Extremities: extremities normal, warm and well-perfused; no cyanosis, clubbing, or edema  Skin:  No rashes or eruptions  Neurologic:  No focal motor deficits  No tremor       Complications:none    Discharge Diagnosis: Principal Problem:    Pneumonia due to COVID-19 virus  Active Problems:    Chronic diastolic congestive heart failure (HCC)    Type 2 diabetes mellitus without complication, without long-term current use of insulin (HCC)    Dyslipidemia    Malignant tumor of cecum (HCC)    Multiple myeloma not having achieved remission (HCC)    Hx of CABG    Essential hypertension    History of aortic valve replacement with bioprosthetic valve    Paroxysmal atrial fibrillation (HCC)    Roberts's esophagus without dysplasia    Fatty liver        Condition at Discharge: good     Discharge instructions/Information to patient and family:   See after visit summary for information provided to patient and family  Provisions for Follow-Up Care:  See after visit summary for information related to follow-up care and any pertinent home health orders  Disposition: Home    Planned Readmission: No    Discharge Statement   I spent 25 minutes discharging the patient  This time was spent on the day of discharge  I had direct contact with the patient on the day of discharge  Discharge Medications:  See after visit summary for reconciled discharge medications provided to patient and family          Anayeli Pepe DO

## 2021-04-11 LAB
BACTERIA BLD CULT: NORMAL
BACTERIA BLD CULT: NORMAL

## 2021-04-12 ENCOUNTER — TRANSITIONAL CARE MANAGEMENT (OUTPATIENT)
Dept: FAMILY MEDICINE CLINIC | Facility: HOSPITAL | Age: 76
End: 2021-04-12

## 2021-04-12 NOTE — UTILIZATION REVIEW
Notification of Discharge  This is a Notification of Discharge from our facility [address]  Please be advised that this patient has been discharge from our facility  Below you will find the admission and discharge date and time including the patients disposition  PRESENTATION DATE: 4/6/2021  6:31 AM  OBS ADMISSION DATE:   IP ADMISSION DATE: 4/6/21 0823   DISCHARGE DATE: 4/10/2021  2:02 PM  DISPOSITION: Home/Self Care Home/Self Care   Admission Orders listed below:  Admission Orders (From admission, onward)     Ordered        04/06/21 0823  Inpatient Admission  Once                   Please contact the UR Department if additional information is required to close this patient's authorization/case  [facility] Utilization Review Department  Main: [phone] x carefully listen to the prompts  All voicemails are confidential   [email or web address]  Send all requests for admission clinical reviews, approved or denied determinations and any other requests to dedicated fax number below belonging to the campus where the patient is receiving treatment   List of dedicated fax numbers:  [address] DENIALS (Administrative/Medical Necessity) [phone]   [address] (Maternity/NICU/Pediatrics) [phone]   Jared Tobin [phone]   Cathi Arevalo [phone]   Pham Reed [phone]   Imani Kennedy [facility] [address] [facility] [phone]   [facility]  [phone]   [address] [facility], [address]  [address] [address] [address] [facility] [phone]

## 2021-04-15 ENCOUNTER — HOSPITAL ENCOUNTER (INPATIENT)
Facility: HOSPITAL | Age: 76
LOS: 5 days | Discharge: HOME HEALTH | DRG: 871 | End: 2021-04-20
Attending: EMERGENCY MEDICINE | Admitting: INTERNAL MEDICINE
Payer: COMMERCIAL

## 2021-04-15 ENCOUNTER — APPOINTMENT (EMERGENCY)
Dept: CT IMAGING | Facility: HOSPITAL | Age: 76
DRG: 871 | End: 2021-04-15
Payer: COMMERCIAL

## 2021-04-15 ENCOUNTER — APPOINTMENT (EMERGENCY)
Dept: RADIOLOGY | Facility: HOSPITAL | Age: 76
DRG: 871 | End: 2021-04-15
Payer: COMMERCIAL

## 2021-04-15 DIAGNOSIS — R09.02 HYPOXIA: ICD-10-CM

## 2021-04-15 DIAGNOSIS — R79.89 ELEVATED BRAIN NATRIURETIC PEPTIDE (BNP) LEVEL: ICD-10-CM

## 2021-04-15 DIAGNOSIS — U07.1 PNEUMONIA DUE TO COVID-19 VIRUS: Primary | ICD-10-CM

## 2021-04-15 DIAGNOSIS — A41.9 SEVERE SEPSIS (HCC): ICD-10-CM

## 2021-04-15 DIAGNOSIS — J12.82 PNEUMONIA DUE TO COVID-19 VIRUS: Primary | ICD-10-CM

## 2021-04-15 DIAGNOSIS — R77.8 ELEVATED TROPONIN: ICD-10-CM

## 2021-04-15 DIAGNOSIS — R79.89 D-DIMER, ELEVATED: ICD-10-CM

## 2021-04-15 DIAGNOSIS — R65.20 SEVERE SEPSIS (HCC): ICD-10-CM

## 2021-04-15 DIAGNOSIS — E87.6 HYPOKALEMIA: ICD-10-CM

## 2021-04-15 DIAGNOSIS — J96.01 ACUTE RESPIRATORY FAILURE WITH HYPOXIA (HCC): ICD-10-CM

## 2021-04-15 LAB
ALBUMIN SERPL BCP-MCNC: 2.8 G/DL (ref 3.5–5)
ALP SERPL-CCNC: 124 U/L (ref 46–116)
ALT SERPL W P-5'-P-CCNC: 22 U/L (ref 12–78)
ANION GAP SERPL CALCULATED.3IONS-SCNC: 11 MMOL/L (ref 4–13)
APTT PPP: 27 SECONDS (ref 23–37)
AST SERPL W P-5'-P-CCNC: 15 U/L (ref 5–45)
ATRIAL RATE: 86 BPM
ATRIAL RATE: 90 BPM
BACTERIA UR QL AUTO: NORMAL /HPF
BASOPHILS # BLD AUTO: 0.02 THOUSANDS/ΜL (ref 0–0.1)
BASOPHILS NFR BLD AUTO: 0 % (ref 0–1)
BILIRUB SERPL-MCNC: 1 MG/DL (ref 0.2–1)
BILIRUB UR QL STRIP: NEGATIVE
BUN SERPL-MCNC: 19 MG/DL (ref 5–25)
CALCIUM ALBUM COR SERPL-MCNC: 9.7 MG/DL (ref 8.3–10.1)
CALCIUM SERPL-MCNC: 8.7 MG/DL (ref 8.3–10.1)
CHLORIDE SERPL-SCNC: 96 MMOL/L (ref 100–108)
CK SERPL-CCNC: 24 U/L (ref 39–308)
CLARITY UR: CLEAR
CO2 SERPL-SCNC: 31 MMOL/L (ref 21–32)
COLOR UR: YELLOW
CREAT SERPL-MCNC: 1.11 MG/DL (ref 0.6–1.3)
CRP SERPL QL: 62.3 MG/L
D DIMER PPP FEU-MCNC: 1.53 UG/ML FEU
EOSINOPHIL # BLD AUTO: 0.11 THOUSAND/ΜL (ref 0–0.61)
EOSINOPHIL NFR BLD AUTO: 1 % (ref 0–6)
ERYTHROCYTE [DISTWIDTH] IN BLOOD BY AUTOMATED COUNT: 20.5 % (ref 11.6–15.1)
GFR SERPL CREATININE-BSD FRML MDRD: 65 ML/MIN/1.73SQ M
GLUCOSE SERPL-MCNC: 209 MG/DL (ref 65–140)
GLUCOSE SERPL-MCNC: 227 MG/DL (ref 65–140)
GLUCOSE SERPL-MCNC: 318 MG/DL (ref 65–140)
GLUCOSE UR STRIP-MCNC: NEGATIVE MG/DL
HCT VFR BLD AUTO: 34.9 % (ref 36.5–49.3)
HGB BLD-MCNC: 10.6 G/DL (ref 12–17)
HGB UR QL STRIP.AUTO: NEGATIVE
IMM GRANULOCYTES # BLD AUTO: 0.16 THOUSAND/UL (ref 0–0.2)
IMM GRANULOCYTES NFR BLD AUTO: 1 % (ref 0–2)
INR PPP: 1.21 (ref 0.84–1.19)
KETONES UR STRIP-MCNC: NEGATIVE MG/DL
LACTATE SERPL-SCNC: 1.2 MMOL/L (ref 0.5–2)
LACTATE SERPL-SCNC: 3.6 MMOL/L (ref 0.5–2)
LEUKOCYTE ESTERASE UR QL STRIP: NEGATIVE
LYMPHOCYTES # BLD AUTO: 0.57 THOUSANDS/ΜL (ref 0.6–4.47)
LYMPHOCYTES NFR BLD AUTO: 4 % (ref 14–44)
MCH RBC QN AUTO: 24.1 PG (ref 26.8–34.3)
MCHC RBC AUTO-ENTMCNC: 30.4 G/DL (ref 31.4–37.4)
MCV RBC AUTO: 79 FL (ref 82–98)
MONOCYTES # BLD AUTO: 1.08 THOUSAND/ΜL (ref 0.17–1.22)
MONOCYTES NFR BLD AUTO: 8 % (ref 4–12)
NEUTROPHILS # BLD AUTO: 12.21 THOUSANDS/ΜL (ref 1.85–7.62)
NEUTS SEG NFR BLD AUTO: 86 % (ref 43–75)
NITRITE UR QL STRIP: NEGATIVE
NON-SQ EPI CELLS URNS QL MICRO: NORMAL /HPF
NRBC BLD AUTO-RTO: 0 /100 WBCS
NT-PROBNP SERPL-MCNC: 7995 PG/ML
P AXIS: 48 DEGREES
P AXIS: 61 DEGREES
PH UR STRIP.AUTO: 6.5 [PH]
PLATELET # BLD AUTO: 229 THOUSANDS/UL (ref 149–390)
PMV BLD AUTO: 11.4 FL (ref 8.9–12.7)
POTASSIUM SERPL-SCNC: 3 MMOL/L (ref 3.5–5.3)
PR INTERVAL: 164 MS
PR INTERVAL: 228 MS
PROCALCITONIN SERPL-MCNC: <0.05 NG/ML
PROT SERPL-MCNC: 6.3 G/DL (ref 6.4–8.2)
PROT UR STRIP-MCNC: ABNORMAL MG/DL
PROTHROMBIN TIME: 15.3 SECONDS (ref 11.6–14.5)
QRS AXIS: -74 DEGREES
QRS AXIS: 67 DEGREES
QRSD INTERVAL: 126 MS
QRSD INTERVAL: 128 MS
QT INTERVAL: 392 MS
QT INTERVAL: 394 MS
QTC INTERVAL: 469 MS
QTC INTERVAL: 481 MS
RBC # BLD AUTO: 4.4 MILLION/UL (ref 3.88–5.62)
RBC #/AREA URNS AUTO: NORMAL /HPF
SODIUM SERPL-SCNC: 138 MMOL/L (ref 136–145)
SP GR UR STRIP.AUTO: 1.01 (ref 1–1.03)
T WAVE AXIS: 102 DEGREES
T WAVE AXIS: 89 DEGREES
TROPONIN I SERPL-MCNC: 0.05 NG/ML
UROBILINOGEN UR QL STRIP.AUTO: 0.2 E.U./DL
VENTRICULAR RATE: 86 BPM
VENTRICULAR RATE: 90 BPM
WBC # BLD AUTO: 14.15 THOUSAND/UL (ref 4.31–10.16)
WBC #/AREA URNS AUTO: NORMAL /HPF

## 2021-04-15 PROCEDURE — 84145 PROCALCITONIN (PCT): CPT | Performed by: EMERGENCY MEDICINE

## 2021-04-15 PROCEDURE — 85379 FIBRIN DEGRADATION QUANT: CPT | Performed by: EMERGENCY MEDICINE

## 2021-04-15 PROCEDURE — 93005 ELECTROCARDIOGRAM TRACING: CPT

## 2021-04-15 PROCEDURE — 83880 ASSAY OF NATRIURETIC PEPTIDE: CPT | Performed by: EMERGENCY MEDICINE

## 2021-04-15 PROCEDURE — 82948 REAGENT STRIP/BLOOD GLUCOSE: CPT

## 2021-04-15 PROCEDURE — 85610 PROTHROMBIN TIME: CPT | Performed by: EMERGENCY MEDICINE

## 2021-04-15 PROCEDURE — 83605 ASSAY OF LACTIC ACID: CPT | Performed by: EMERGENCY MEDICINE

## 2021-04-15 PROCEDURE — 86140 C-REACTIVE PROTEIN: CPT | Performed by: EMERGENCY MEDICINE

## 2021-04-15 PROCEDURE — 87040 BLOOD CULTURE FOR BACTERIA: CPT | Performed by: EMERGENCY MEDICINE

## 2021-04-15 PROCEDURE — 71275 CT ANGIOGRAPHY CHEST: CPT

## 2021-04-15 PROCEDURE — 82550 ASSAY OF CK (CPK): CPT | Performed by: EMERGENCY MEDICINE

## 2021-04-15 PROCEDURE — 84484 ASSAY OF TROPONIN QUANT: CPT | Performed by: EMERGENCY MEDICINE

## 2021-04-15 PROCEDURE — 71045 X-RAY EXAM CHEST 1 VIEW: CPT

## 2021-04-15 PROCEDURE — 99285 EMERGENCY DEPT VISIT HI MDM: CPT | Performed by: EMERGENCY MEDICINE

## 2021-04-15 PROCEDURE — 99285 EMERGENCY DEPT VISIT HI MDM: CPT

## 2021-04-15 PROCEDURE — 85025 COMPLETE CBC W/AUTO DIFF WBC: CPT | Performed by: EMERGENCY MEDICINE

## 2021-04-15 PROCEDURE — 93010 ELECTROCARDIOGRAM REPORT: CPT | Performed by: INTERNAL MEDICINE

## 2021-04-15 PROCEDURE — 36415 COLL VENOUS BLD VENIPUNCTURE: CPT | Performed by: EMERGENCY MEDICINE

## 2021-04-15 PROCEDURE — 80053 COMPREHEN METABOLIC PANEL: CPT | Performed by: EMERGENCY MEDICINE

## 2021-04-15 PROCEDURE — 96365 THER/PROPH/DIAG IV INF INIT: CPT

## 2021-04-15 PROCEDURE — 99223 1ST HOSP IP/OBS HIGH 75: CPT | Performed by: INTERNAL MEDICINE

## 2021-04-15 PROCEDURE — 81001 URINALYSIS AUTO W/SCOPE: CPT | Performed by: EMERGENCY MEDICINE

## 2021-04-15 PROCEDURE — 85730 THROMBOPLASTIN TIME PARTIAL: CPT | Performed by: EMERGENCY MEDICINE

## 2021-04-15 RX ORDER — FERROUS SULFATE 325(65) MG
325 TABLET ORAL
Status: DISCONTINUED | OUTPATIENT
Start: 2021-04-16 | End: 2021-04-20 | Stop reason: HOSPADM

## 2021-04-15 RX ORDER — FUROSEMIDE 20 MG/1
20 TABLET ORAL 2 TIMES DAILY
Status: DISCONTINUED | OUTPATIENT
Start: 2021-04-16 | End: 2021-04-17

## 2021-04-15 RX ORDER — MELATONIN
2000 DAILY
Status: DISCONTINUED | OUTPATIENT
Start: 2021-04-15 | End: 2021-04-20 | Stop reason: HOSPADM

## 2021-04-15 RX ORDER — SODIUM CHLORIDE, SODIUM GLUCONATE, SODIUM ACETATE, POTASSIUM CHLORIDE, MAGNESIUM CHLORIDE, SODIUM PHOSPHATE, DIBASIC, AND POTASSIUM PHOSPHATE .53; .5; .37; .037; .03; .012; .00082 G/100ML; G/100ML; G/100ML; G/100ML; G/100ML; G/100ML; G/100ML
500 INJECTION, SOLUTION INTRAVENOUS ONCE
Status: COMPLETED | OUTPATIENT
Start: 2021-04-15 | End: 2021-04-15

## 2021-04-15 RX ORDER — MULTIVITAMIN/IRON/FOLIC ACID 18MG-0.4MG
1 TABLET ORAL DAILY
Status: DISCONTINUED | OUTPATIENT
Start: 2021-04-22 | End: 2021-04-20 | Stop reason: HOSPADM

## 2021-04-15 RX ORDER — FUROSEMIDE 10 MG/ML
40 INJECTION INTRAMUSCULAR; INTRAVENOUS ONCE
Status: COMPLETED | OUTPATIENT
Start: 2021-04-15 | End: 2021-04-15

## 2021-04-15 RX ORDER — ZINC SULFATE 50(220)MG
220 CAPSULE ORAL DAILY
Status: DISCONTINUED | OUTPATIENT
Start: 2021-04-15 | End: 2021-04-20 | Stop reason: HOSPADM

## 2021-04-15 RX ORDER — ONDANSETRON 2 MG/ML
4 INJECTION INTRAMUSCULAR; INTRAVENOUS EVERY 6 HOURS PRN
Status: DISCONTINUED | OUTPATIENT
Start: 2021-04-15 | End: 2021-04-20 | Stop reason: HOSPADM

## 2021-04-15 RX ORDER — LEVOFLOXACIN 5 MG/ML
750 INJECTION, SOLUTION INTRAVENOUS EVERY 24 HOURS
Status: DISCONTINUED | OUTPATIENT
Start: 2021-04-15 | End: 2021-04-17

## 2021-04-15 RX ORDER — MESALAMINE 4 G/60ML
4 ENEMA RECTAL
Status: DISCONTINUED | OUTPATIENT
Start: 2021-04-15 | End: 2021-04-20 | Stop reason: HOSPADM

## 2021-04-15 RX ORDER — DEXAMETHASONE SODIUM PHOSPHATE 4 MG/ML
6 INJECTION, SOLUTION INTRA-ARTICULAR; INTRALESIONAL; INTRAMUSCULAR; INTRAVENOUS; SOFT TISSUE EVERY 24 HOURS
Status: DISCONTINUED | OUTPATIENT
Start: 2021-04-16 | End: 2021-04-20 | Stop reason: HOSPADM

## 2021-04-15 RX ORDER — METOPROLOL SUCCINATE 50 MG/1
100 TABLET, EXTENDED RELEASE ORAL DAILY
Status: DISCONTINUED | OUTPATIENT
Start: 2021-04-15 | End: 2021-04-20 | Stop reason: HOSPADM

## 2021-04-15 RX ORDER — ATORVASTATIN CALCIUM 40 MG/1
40 TABLET, FILM COATED ORAL DAILY
Status: DISCONTINUED | OUTPATIENT
Start: 2021-04-15 | End: 2021-04-20 | Stop reason: HOSPADM

## 2021-04-15 RX ORDER — CEFTRIAXONE 1 G/50ML
1000 INJECTION, SOLUTION INTRAVENOUS EVERY 24 HOURS
Status: DISCONTINUED | OUTPATIENT
Start: 2021-04-15 | End: 2021-04-15 | Stop reason: ALTCHOICE

## 2021-04-15 RX ORDER — POTASSIUM CHLORIDE 20 MEQ/1
40 TABLET, EXTENDED RELEASE ORAL ONCE
Status: COMPLETED | OUTPATIENT
Start: 2021-04-15 | End: 2021-04-15

## 2021-04-15 RX ORDER — FAMOTIDINE 20 MG/1
20 TABLET, FILM COATED ORAL 2 TIMES DAILY
Status: DISCONTINUED | OUTPATIENT
Start: 2021-04-15 | End: 2021-04-20 | Stop reason: HOSPADM

## 2021-04-15 RX ORDER — POTASSIUM CHLORIDE 20 MEQ/1
40 TABLET, EXTENDED RELEASE ORAL EVERY 4 HOURS
Status: COMPLETED | OUTPATIENT
Start: 2021-04-15 | End: 2021-04-15

## 2021-04-15 RX ORDER — ASCORBIC ACID 500 MG
1000 TABLET ORAL EVERY 12 HOURS SCHEDULED
Status: DISCONTINUED | OUTPATIENT
Start: 2021-04-15 | End: 2021-04-20 | Stop reason: HOSPADM

## 2021-04-15 RX ORDER — ACETAMINOPHEN 325 MG/1
650 TABLET ORAL EVERY 6 HOURS PRN
Status: DISCONTINUED | OUTPATIENT
Start: 2021-04-15 | End: 2021-04-20 | Stop reason: HOSPADM

## 2021-04-15 RX ORDER — DOXYCYCLINE HYCLATE 100 MG/1
100 CAPSULE ORAL EVERY 12 HOURS SCHEDULED
Status: DISCONTINUED | OUTPATIENT
Start: 2021-04-15 | End: 2021-04-17

## 2021-04-15 RX ORDER — ASPIRIN 81 MG/1
81 TABLET ORAL DAILY
Status: DISCONTINUED | OUTPATIENT
Start: 2021-04-16 | End: 2021-04-20 | Stop reason: HOSPADM

## 2021-04-15 RX ORDER — ALBUTEROL SULFATE 90 UG/1
2 AEROSOL, METERED RESPIRATORY (INHALATION) EVERY 4 HOURS PRN
Status: DISCONTINUED | OUTPATIENT
Start: 2021-04-15 | End: 2021-04-20 | Stop reason: HOSPADM

## 2021-04-15 RX ORDER — ASPIRIN 81 MG/1
324 TABLET, CHEWABLE ORAL ONCE
Status: COMPLETED | OUTPATIENT
Start: 2021-04-15 | End: 2021-04-15

## 2021-04-15 RX ORDER — SACCHAROMYCES BOULARDII 250 MG
250 CAPSULE ORAL 2 TIMES DAILY
Status: DISCONTINUED | OUTPATIENT
Start: 2021-04-15 | End: 2021-04-20 | Stop reason: HOSPADM

## 2021-04-15 RX ADMIN — IOHEXOL 85 ML: 350 INJECTION, SOLUTION INTRAVENOUS at 07:00

## 2021-04-15 RX ADMIN — OXYCODONE HYDROCHLORIDE AND ACETAMINOPHEN 1000 MG: 500 TABLET ORAL at 11:46

## 2021-04-15 RX ADMIN — DOXYCYCLINE 100 MG: 100 CAPSULE ORAL at 11:46

## 2021-04-15 RX ADMIN — Medication 2000 UNITS: at 11:45

## 2021-04-15 RX ADMIN — VANCOMYCIN HYDROCHLORIDE 1250 MG: 5 INJECTION, POWDER, LYOPHILIZED, FOR SOLUTION INTRAVENOUS at 08:29

## 2021-04-15 RX ADMIN — ENOXAPARIN SODIUM 30 MG: 30 INJECTION SUBCUTANEOUS at 22:19

## 2021-04-15 RX ADMIN — POTASSIUM CHLORIDE 40 MEQ: 1500 TABLET, EXTENDED RELEASE ORAL at 14:50

## 2021-04-15 RX ADMIN — POTASSIUM CHLORIDE 40 MEQ: 1500 TABLET, EXTENDED RELEASE ORAL at 07:35

## 2021-04-15 RX ADMIN — ENOXAPARIN SODIUM 30 MG: 30 INJECTION SUBCUTANEOUS at 11:45

## 2021-04-15 RX ADMIN — FAMOTIDINE 20 MG: 20 TABLET ORAL at 17:44

## 2021-04-15 RX ADMIN — Medication 250 MG: at 11:44

## 2021-04-15 RX ADMIN — LEVOFLOXACIN 750 MG: 5 INJECTION, SOLUTION INTRAVENOUS at 06:52

## 2021-04-15 RX ADMIN — SODIUM CHLORIDE, SODIUM GLUCONATE, SODIUM ACETATE, POTASSIUM CHLORIDE, MAGNESIUM CHLORIDE, SODIUM PHOSPHATE, DIBASIC, AND POTASSIUM PHOSPHATE 500 ML: .53; .5; .37; .037; .03; .012; .00082 INJECTION, SOLUTION INTRAVENOUS at 06:52

## 2021-04-15 RX ADMIN — DOXYCYCLINE 100 MG: 100 CAPSULE ORAL at 22:18

## 2021-04-15 RX ADMIN — POTASSIUM CHLORIDE 40 MEQ: 1500 TABLET, EXTENDED RELEASE ORAL at 17:44

## 2021-04-15 RX ADMIN — FUROSEMIDE 40 MG: 10 INJECTION, SOLUTION INTRAMUSCULAR; INTRAVENOUS at 14:50

## 2021-04-15 RX ADMIN — FAMOTIDINE 20 MG: 20 TABLET ORAL at 11:45

## 2021-04-15 RX ADMIN — OXYCODONE HYDROCHLORIDE AND ACETAMINOPHEN 1000 MG: 500 TABLET ORAL at 22:18

## 2021-04-15 RX ADMIN — ASPIRIN 81 MG CHEWABLE TABLET 324 MG: 81 TABLET CHEWABLE at 06:51

## 2021-04-15 RX ADMIN — Medication 250 MG: at 17:44

## 2021-04-15 RX ADMIN — DEXAMETHASONE SODIUM PHOSPHATE 6 MG: 10 INJECTION, SOLUTION INTRAMUSCULAR; INTRAVENOUS at 07:39

## 2021-04-15 RX ADMIN — ZINC SULFATE 220 MG (50 MG) CAPSULE 220 MG: CAPSULE at 11:45

## 2021-04-15 RX ADMIN — METOPROLOL SUCCINATE 100 MG: 50 TABLET, EXTENDED RELEASE ORAL at 11:45

## 2021-04-15 RX ADMIN — ATORVASTATIN CALCIUM 40 MG: 40 TABLET, FILM COATED ORAL at 11:44

## 2021-04-15 RX ADMIN — INSULIN LISPRO 8 UNITS: 100 INJECTION, SOLUTION INTRAVENOUS; SUBCUTANEOUS at 15:47

## 2021-04-15 NOTE — ASSESSMENT & PLAN NOTE
Patient takes Lasix, Toprol-XL and Zestril at home    Will continue on Toprol-XL and Lasix  Monitor vitals as per protocol

## 2021-04-15 NOTE — SPEECH THERAPY NOTE
Speech Language/Pathology Screen    Order received, chart reviewed  Spoke w/ RN and physician, no reported concerns for dysphagia/aspiration  RN reports pt took medications and regular texture meals without difficulty  RN spoke w/ pt, pt denies dysphagia, odynophagia, or s/s aspiration  No formal ST evaluation necessary at this time, please re-consult if medically indicated

## 2021-04-15 NOTE — ED PROVIDER NOTES
History  Chief Complaint   Patient presents with    Shortness of Breath     20-year-old male past medical history of atrial fibrillation presents for evaluation of continued shortness of breath, patient was diagnosed with COVID pneumonia on April 6, approximately 1 week after receiving his 2nd COVID vaccination, subsequently got admitted for hypoxia requiring 2 L nasal cannula and was discharged on April 10th  He states that he has been doing well typically saturating at home in low 90s, actually felt in the best he has since discharge yesterday however yesterday evening started having worsening shortness of breath, worse on exertion, no associated chest pain however did have productive cough, intermittently with blood-tinged mucus, states that he has had that since being admitted to the hospital   This morning his breathing has continued to get worse and he came in for evaluation  On initial evaluation patient saturating in low 80s on room air, improved to mid 90s on 2 L nasal cannula  He does appear to be mildly tachypneic  Febrile on arrival   He states that he was diagnosed with COVID approximately 1 week after 2nd dose of Pfizer vaccination  No history of DVT or PE, no current chest pain  He is currently not on any anticoagulation  He does have history of atrial fibrillation and previous colectomy for colon cancer  Prior to Admission Medications   Prescriptions Last Dose Informant Patient Reported?  Taking?   aspirin 81 MG tablet  Self Yes No   Sig: Take 1 tablet (81 mg total) by mouth daily   atorvastatin (LIPITOR) 40 mg tablet  Self No No   Sig: Take 1 tablet (40 mg total) by mouth daily   cholecalciferol (VITAMIN D3) 1,000 units tablet   No No   Sig: Take 2 tablets (2,000 Units total) by mouth daily   diphenoxylate-atropine (LOMOTIL) 2 5-0 025 mg per tablet   No No   Sig: Take 1 tablet by mouth 4 (four) times a day as needed for diarrhea   ferrous sulfate 324 MG TBEC  Self Yes No   Sig: Take 324 mg by mouth   furosemide (LASIX) 20 mg tablet  Self No No   Sig: TAKE 1 TABLET BY MOUTH TWICE A DAY   lisinopril (ZESTRIL) 20 mg tablet  Self No No   Sig: TAKE 1 TABLET BY MOUTH EVERY DAY   mesalamine (ROWASA) 4 g   No No   Sig: Insert 60 mL (4 g total) into the rectum daily at bedtime   metFORMIN (GLUCOPHAGE-XR) 500 mg 24 hr tablet   No No   Sig: TAKE 1 TABLET BY MOUTH EVERY DAY WITH DINNER   metoprolol succinate (TOPROL-XL) 100 mg 24 hr tablet   No No   Sig: TAKE 1 TABLET BY MOUTH EVERY DAY   multivitamin-minerals (CENTRUM ADULTS) tablet   No No   Sig: Take 1 tablet by mouth daily   nitroglycerin (NITROSTAT) 0 3 mg SL tablet  Self Yes No   Sig: Nitrostat 0 3 mg sublingual tablet   Place 1 tablet as needed by sublingual route as needed for 90 days     omeprazole (PriLOSEC) 40 MG capsule  Self No No   Sig: Take 1 capsule (40 mg total) by mouth daily   predniSONE 10 mg tablet   No No   Simg qdx3d then 30mg qdx3d then 20mg qdx3d then 10mg qdx3d   saccharomyces boulardii (FLORASTOR) 250 mg capsule   No No   Sig: Take 1 capsule (250 mg total) by mouth 2 (two) times a day      Facility-Administered Medications: None       Past Medical History:   Diagnosis Date    Arthritis     Atrial fibrillation (HCC)     Cataract     Colitis     [facility] cancer ([facility] )     Diabetes mellitus type II, non insulin dependent [facility]     Fatty liver     History of chemotherapy     History of radiation therapy     History of shingles 2018    Hypertension     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[facility] )     Pneumonia     Skin cancer        Past Surgical History:   Procedure Laterality Date    AORTIC VALVE REPLACEMENT      COLECTOMY      COLONOSCOPY  2019    Prior right hemicolectomy    CORONARY ARTERY BYPASS GRAFT      DENTAL SURGERY      KNEE SURGERY      LYMPHADENECTOMY      OTHER SURGICAL HISTORY      MAZE PROCEDURE    MD TOTAL KNEE ARTHROPLASTY Left 2019    Procedure: ARTHROPLASTY LEFT KNEE TOTAL;  Surgeon: Karmen Pendleton MD; Location: [facility] OR;  Service: Orthopedics    SKIN BIOPSY      UPPER GASTROINTESTINAL ENDOSCOPY  2019    Cricket gu       Family History   Problem Relation Age of Onset    Heart block Family     Coronary artery disease Mother     Thrombosis Mother     Hypertension Mother    Brunilda Lama Arthritis Mother     Hyperlipidemia Mother     Diabetes Father     Hypertension Father     Arthritis Father     Sudden death Father         cardiac    Colon cancer Paternal Grandfather     Breast cancer Sister     Heart disease Brother         Coronary stents     I have reviewed and agree with the history as documented  E-Cigarette/Vaping    E-Cigarette Use Never User      E-Cigarette/Vaping Substances    Nicotine No     THC No     CBD No     Flavoring No     Other No     Unknown No      Social History     Tobacco Use    Smoking status: Former Smoker     Packs/day: 1 00     Years: 40 00     Pack years: 40 00     Types: Cigarettes     Quit date:      Years since quittin     Smokeless tobacco: Never Used   Substance Use Topics    Alcohol use: Yes     Frequency: Monthly or less     Drinks per session: 1 or 2     Binge frequency: Never     Comment: very rare "beer here and there"    Drug use: No       Review of Systems   Constitutional: Positive for fatigue and fever  Negative for appetite change and chills  HENT: Negative for rhinorrhea and sore throat  Eyes: Negative for photophobia and visual disturbance  Respiratory: Positive for cough and shortness of breath  Cardiovascular: Negative for chest pain and palpitations  Gastrointestinal: Negative for abdominal pain and diarrhea  Genitourinary: Negative for dysuria, frequency and urgency  Skin: Negative for rash  Neurological: Negative for dizziness and weakness  All other systems reviewed and are negative  Physical Exam  Physical Exam  Vitals signs and nursing note reviewed     Constitutional:       Appearance: He is well-developed  HENT:      Head: Normocephalic and atraumatic  Right Ear: External ear normal       Left Ear: External ear normal    Eyes:      Conjunctiva/sclera: Conjunctivae normal       Pupils: Pupils are equal, round, and reactive to light  Neck:      Musculoskeletal: Normal range of motion and neck supple  Vascular: No JVD  Trachea: No tracheal deviation  Cardiovascular:      Rate and Rhythm: Normal rate and regular rhythm  Heart sounds: Normal heart sounds  No murmur  No friction rub  No gallop  Pulmonary:      Effort: Pulmonary effort is normal  No respiratory distress  Breath sounds: No stridor  Decreased breath sounds present  No rales  Abdominal:      General: There is no distension  Palpations: Abdomen is soft  There is no mass  Tenderness: There is no abdominal tenderness  There is no guarding or rebound  Musculoskeletal: Normal range of motion  Skin:     General: Skin is warm and dry  Coloration: Skin is not pale  Findings: No erythema or rash  Neurological:      Mental Status: He is alert and oriented to person, place, and time  Cranial Nerves: No cranial nerve deficit           Vital Signs  ED Triage Vitals [04/15/21 0546]   Temperature Pulse Respirations Blood Pressure SpO2   (!) 101 3 °F (38 5 °C) 97 22 (!) 184/80 (!) 86 %      Temp Source Heart Rate Source Patient Position - Orthostatic VS BP Location FiO2 (%)   Oral Monitor Lying Right arm --      Pain Score       No Pain           Vitals:    04/15/21 0546 04/15/21 0600   BP: (!) 184/80 141/65   Pulse: 97 86   Patient Position - Orthostatic VS: Lying Lying         Visual Acuity      ED Medications  Medications   levofloxacin (LEVAQUIN) IVPB (premix in dextrose) 750 mg 150 mL (750 mg Intravenous New Bag 4/15/21 0652)   vancomycin (VANCOCIN) 1250 mg in sodium chloride 0 9% 250 mL IVPB (has no administration in time range)   multi-electrolyte (ISOLYTE-S PH 7 4) bolus 500 mL (500 mL Intravenous New Bag 4/15/21 0652)   aspirin chewable tablet 324 mg (324 mg Oral Given 4/15/21 0651)   iohexol (OMNIPAQUE) 350 MG/ML injection (SINGLE-DOSE) 85 mL (85 mL Intravenous Given 4/15/21 0700)   potassium chloride (K-DUR,KLOR-CON) CR tablet 40 mEq (40 mEq Oral Given 4/15/21 0735)   dexamethasone oral liquid 6 mg 0 6 mL (6 mg Oral Given 4/15/21 0739)       Diagnostic Studies  Results Reviewed     Procedure Component Value Units Date/Time    Lactic acid, plasma [[id number]]  (Abnormal) Collected: 04/15/21 0613    Lab Status: Final result Specimen: Blood from Arm, Right Updated: 04/15/21 0710     LACTIC ACID 3 6 mmol/L     Narrative:      Result may be elevated if tourniquet was used during collection      Lactic acid 2 Hours [[id number]]     Lab Status: No result Specimen: Blood     CBC and differential [[id number]]  (Abnormal) Collected: 04/15/21 0601    Lab Status: Final result Specimen: Blood from Arm, Right Updated: 04/15/21 0701     WBC 14 15 Thousand/uL      RBC 4 40 Million/uL      Hemoglobin 10 6 g/dL      Hematocrit 34 9 %      MCV 79 fL      MCH 24 1 pg      MCHC 30 4 g/dL      RDW 20 5 %      MPV 11 4 fL      Platelets --     nRBC 0 /100 WBCs      Neutrophils Relative 86 %      Immat GRANS % 1 %      Lymphocytes Relative 4 %      Monocytes Relative 8 %      Eosinophils Relative 1 %      Basophils Relative 0 %      Neutrophils Absolute 12 21 Thousands/µL      Immature Grans Absolute 0 16 Thousand/uL      Lymphocytes Absolute 0 57 Thousands/µL      Monocytes Absolute 1 08 Thousand/µL      Eosinophils Absolute 0 11 Thousand/µL      Basophils Absolute 0 02 Thousands/µL     Narrative:       Previous Platelet Clumper    Urine Microscopic [[id number]]  (Normal) Collected: 04/15/21 0620    Lab Status: Final result Specimen: Urine, Clean Catch Updated: 04/15/21 0656     RBC, UA None Seen /hpf      WBC, UA 0-1 /hpf      Epithelial Cells Occasional /hpf      Bacteria, UA None Seen /hpf     Specimen draw for platelet clumping [[id number]]  (Normal) Collected: 04/15/21 0640    Lab Status: Final result Specimen: Arm, Right Updated: 04/15/21 0654     Citrated Platelets 957 Thousands/uL     Comprehensive metabolic panel [[id number]]  (Abnormal) Collected: 04/15/21 0600    Lab Status: Final result Specimen: Blood from Arm, Right Updated: 04/15/21 0648     Sodium 138 mmol/L      Potassium 3 0 mmol/L      Chloride 96 mmol/L      CO2 31 mmol/L      ANION GAP 11 mmol/L      BUN 19 mg/dL      Creatinine 1 11 mg/dL      Glucose 227 mg/dL      Calcium 8 7 mg/dL      Corrected Calcium 9 7 mg/dL      AST 15 U/L      ALT 22 U/L      Alkaline Phosphatase 124 U/L      Total Protein 6 3 g/dL      Albumin 2 8 g/dL      Total Bilirubin 1 00 mg/dL      eGFR 65 ml/min/1 73sq m     Narrative:      [place] guidelines for Chronic Kidney Disease (CKD):     Stage 1 with normal or high GFR (GFR > 90 mL/min/1 73 square meters)    Stage 2 Mild CKD (GFR = 60-89 mL/min/1 73 square meters)    Stage 3A Moderate CKD (GFR = 45-59 mL/min/1 73 square meters)    Stage 3B Moderate CKD (GFR = 30-44 mL/min/1 73 square meters)    Stage 4 Severe CKD (GFR = 15-29 mL/min/1 73 square meters)    Stage 5 End Stage CKD (GFR <15 mL/min/1 73 square meters)  Note: GFR calculation is accurate only with a steady state creatinine    NT-BNP PRO [[id number]]  (Abnormal) Collected: 04/15/21 0600    Lab Status: Final result Specimen: Blood from Arm, Right Updated: 04/15/21 0648     NT-proBNP 7,995 pg/mL     C-reactive protein [[id number]]  (Abnormal) Collected: 04/15/21 0600    Lab Status: Final result Specimen: Blood from Arm, Right Updated: 04/15/21 0648     CRP 62 3 mg/L     CK (with reflex to MB) [[id number]]  (Abnormal) Collected: 04/15/21 0600    Lab Status: Final result Specimen: Blood from Arm, Right Updated: 04/15/21 0647     Total CK 24 U/L     Troponin I [[id number]]  (Abnormal) Collected: 04/15/21 0600    Lab Status: Final result Specimen: Blood from Arm, Right Updated: 04/15/21 0641     Troponin I 0 05 ng/mL     D-dimer, quantitative [[id number]]  (Abnormal) Collected: 04/15/21 0600    Lab Status: Final result Specimen: Blood from Arm, Right Updated: 04/15/21 0639     D-Dimer, Quant 1 53 ug/ml FEU     Protime-INR [[id number]]  (Abnormal) Collected: 04/15/21 0600    Lab Status: Final result Specimen: Blood from Arm, Right Updated: 04/15/21 0639     Protime 15 3 seconds      INR 1 21    APTT [[id number]]  (Normal) Collected: 04/15/21 0600    Lab Status: Final result Specimen: Blood from Arm, Right Updated: 04/15/21 0639     PTT 27 seconds     UA w Reflex to Microscopic w Reflex to Culture [[id number]]  (Abnormal) Collected: 04/15/21 0620    Lab Status: Final result Specimen: Urine, Clean Catch Updated: 04/15/21 2294     Color, UA Yellow     Clarity, UA Clear     Specific Gravity, UA 1 010     pH, UA 6 5     Leukocytes, UA Negative     Nitrite, UA Negative     Protein, UA 30 (1+) mg/dl      Glucose, UA Negative mg/dl      Ketones, UA Negative mg/dl      Urobilinogen, UA 0 2 E U /dl      Bilirubin, UA Negative     Blood, UA Negative    Blood culture [[id number]] Collected: 04/15/21 0600    Lab Status: In process Specimen: Blood from Arm, Right Updated: 04/15/21 0618    Procalcitonin with AM Reflex [[id number]] Collected: 04/15/21 0600    Lab Status: In process Specimen: Blood from Arm, Right Updated: 04/15/21 0618    Blood culture [[id number]] Collected: 04/15/21 0600    Lab Status: In process Specimen: Blood from Arm, Right Updated: 04/15/21 0090                 CTA ED chest PE Study   Final Result by Margie Rodriguez MD (04/15 2005)      No pulmonary embolus  Extensive new bilateral groundglass opacity and septal thickening, likely a combination of Covid-19 pneumonia and edema  Redemonstration of juxtapleural nodules  The previous parenchymal nodules are not well demonstrated due to the new groundglass opacity  Persistent mediastinal lymphadenopathy  These abnormalities should be reevaluated in 3 months, after clearing of    acute infection  Small bilateral pleural effusions, greater on the left  The right effusion has increased slightly from 3/26/2021  This study was marked for significant notification and follow-up  Workstation performed: [id number]         XR chest portable   ED Interpretation by Liu Jordan MD (63/07 [id number])   Bilateral infiltrates                   Procedures  Procedures         ED Course  ED Course as of Apr 15 0739   Thu Apr 15, 2021   7281 Procedure Note: EKG  Date/Time: 04/15/21 5:52 AM   Performed by: Jess Chawla  Authorized by: Jess Chawla  Indications / Diagnosis: CP  ECG reviewed by me, the ED Provider: yes   The EKG demonstrates:  Rhythm: normal sinus  Intervals: normal intervals  Axis: normal axis  QRS/Blocks: normal QRS  ST Changes: poor baseline, no acute MIGUEL ANGEL, nonspecific st changes    Frequent PVCs      0600 Repeat EKG with frequent PVCs, nonspecific ST changes      0721 Previously elevated   Troponin I(!): 0 05                         Initial Sepsis Screening     Row Name 04/15/21 0717                Is the patient's history suggestive of a new or worsening infection? (!) Yes (Proceed)  -EB        Suspected source of infection  pneumonia  -EB        Are two or more of the following signs & symptoms of infection both present and new to the patient? (!) Yes (Proceed)  -EB        Indicate SIRS criteria  Hyperthemia > 38 3C (100 9F); Tachypnea > 20 resp per min  -EB        If the answer is yes to both questions, suspicion of sepsis is present  --        If severe sepsis is present AND tissue hypoperfusion perists in the hour after fluid resuscitation or lactate > 4, the patient meets criteria for SEPTIC SHOCK  --        Are any of the following organ dysfunction criteria present within 6 hours of suspected infection and SIRS criteria that are NOT considered to be chronic conditions?   (!) Yes  -EB Organ dysfunction  Lactate > 2 0 mmol/L  -EB        Date of presentation of severe sepsis  04/15/21  -EB        Time of presentation of severe sepsis  0718  -EB        Tissue hypoperfusion persists in the hour after crystalloid fluid administration, evidenced, by either:  --        Was hypotension present within one hour of the conclusion of crystalloid fluid administration? No  -EB        Date of presentation of septic shock  --        Time of presentation of septic shock  --          User Key  (r) = Recorded By, (t) = Taken By, (c) = Cosigned By    [address] Name Provider Type    EB Ellen Resendiz MD Physician           Default Flowsheet Data (last 720 hours)      Sepsis Reassess     Row Name 04/15/21 0730                   Repeat Volume Status and Tissue Perfusion Assessment Performed    Repeat Volume Status and Tissue Perfusion Assessment Performed  Yes  -EB           Volume Status and Tissue Perfusion Post Fluid Resuscitation * Must Document All *    Vital Signs Reviewed (HR, RR, BP, T)  Yes  -EB        Shock Index Reviewed  Yes  -EB        Arterial Oxygen Saturation Reviewed (POx, SaO2 or SpO2)  Yes (comment %) 94  -EB        Cardio  Normal S1/S2; Regular rate and rhythm  -EB        Pulmonary  (!) Tachypnea  -EB        Capillary Refill  Brisk  -EB        Peripheral Pulses  Radial;Posterior Tibialis;Dorsalis Pedis  -EB        Peripheral Pulse  +2  -EB        Dorsalis Pedis  +2  -EB        Posterior Tibialis  +2  -EB        Skin  Warm  -EB        Urine output assessed  Adequate  -EB           *OR*   Intensive Monitoring- Must Document One of the Following Four *:    Vital Signs Reviewed  --        * Central Venous Pressure (CVP or RAP)  --        * Central Venous Oxygen (SVO2, ScvO2 or Oxygen saturation via central catheter)  --        * Bedside Cardiovascular US in IVC diameter and % collapse  --        * Passive Leg Raise OR Crystalloid Challenge  --          User Key  (r) = Recorded By, (t) = Taken By, (c) = Cosigned By    [address]  Name Provider Maude Cutler MD Physician        SBIRT 20yo+      Most Recent Value   SBIRT (23 yo +)   In order to provide better care to our patients, we are screening all of our patients for alcohol and drug use  Would it be okay to ask you these screening questions? Yes Filed at: 04/15/2021 0550   Initial Alcohol Screen: US AUDIT-C    1  How often do you have a drink containing alcohol? 1 Filed at: 04/15/2021 0550   2  How many drinks containing alcohol do you have on a typical day you are drinking? 1 Filed at: 04/15/2021 0550   3a  Male UNDER 65: How often do you have five or more drinks on one occasion? 0 Filed at: 04/15/2021 0550   3b  FEMALE Any Age, or MALE 65+: How often do you have 4 or more drinks on one occassion? 0 Filed at: 04/15/2021 0550   Audit-C Score  2 Filed at: 04/15/2021 3211   BETO: How many times in the past year have you    Used an illegal drug or used a prescription medication for non-medical reasons?   Never Filed at: 04/15/2021 0550                    [facility]  Number of Diagnoses or Management Options  Diagnosis management comments: 67-year-old male with COVID pneumonia currently day 9 from diagnosis presents for evaluation of shortness of breath, worsening cough, he is hypoxic on room air, febrile, will check blood work including D-dimer will re-evaluate      Disposition  Final diagnoses:   Pneumonia due to COVID-19 virus   Severe sepsis ([facility] )   Hypoxia   Elevated troponin   Elevated brain natriuretic peptide (BNP) level   Hypokalemia     Time reflects when diagnosis was documented in both MDM as applicable and the Disposition within this note     Time User Action Codes Description Comment    4/15/2021  7:16 AM Heydi Earing Add [U07 1,  J12 82] Pneumonia due to COVID-19 virus     4/15/2021  7:17 AM Heydi Earing Add [A41 9,  R65 20] Severe sepsis ([facility] )     4/15/2021  7:17 AM Heydi Earing Add [R09 02] Hypoxia     4/15/2021  7:17 AM Heydi Earing Add [R77 8] Elevated troponin     4/15/2021  7:31 AM Soren Rater Add [R79 89] Elevated brain natriuretic peptide (BNP) level     4/15/2021  7:39 AM Soren Rater Add [E87 6] Hypokalemia       ED Disposition     ED Disposition Condition Date/Time Comment    Admit Stable Thu Apr 15, 2021  7:31 AM Case was discussed with WINTER and the patient's admission status was agreed to be Admission Status: inpatient status to the service of Dr Leann Barclay   Follow-up Information    None         Patient's Medications   Discharge Prescriptions    No medications on file     No discharge procedures on file      PDMP Review       Value Time User    PDMP Reviewed  Yes 4/10/2021 12:23 PM Davey Yoo DO          ED Provider  Electronically Signed by           Ryan Devine MD  04/15/21 0470

## 2021-04-15 NOTE — ED TRIAGE NOTES
Pt presents to ED from home and ambulated back to ED room  Pt reports he has tested positive for COVID within the last 14 days  Pt states this morning he became more SOB while resting and exertion  Pulse ox at 91% to 89% ambulating back to ED room  Pulse ox down to 87% once in stretcher

## 2021-04-15 NOTE — ASSESSMENT & PLAN NOTE
Patient is requiring 2 L of supplemental oxygen  Likely secondary to COVID-19 pneumonia  Wean oxygen as tolerated  Continue on IV antibiotic  See above

## 2021-04-15 NOTE — ASSESSMENT & PLAN NOTE
Lab Results   Component Value Date    HGBA1C 5 8 (H) 12/30/2020       No results for input(s): POCGLU in the last 72 hours  Blood Sugar Average: Last 72 hrs:   hold metformin  Accu-Chek a c  HS with Humalog sliding scale

## 2021-04-15 NOTE — SEPSIS NOTE
Sepsis Note   Allyson Vidales 77 y o  male MRN: [id number]  Unit/Bed#: ED 07 Encounter: [id number]      qSOFA     [address] Name 04/15/21 0600 04/15/21 0546             Altered mental status GCS < 15  --  --       Respiratory Rate > / =22  1  1       Systolic BP < / =308  0  0       Q Sofa Score  1  1           Initial Sepsis Screening     Row Name 04/15/21 9902                Is the patient's history suggestive of a new or worsening infection? (!) Yes (Proceed)  -EB        Suspected source of infection  pneumonia  -EB        Are two or more of the following signs & symptoms of infection both present and new to the patient? (!) Yes (Proceed)  -EB        Indicate SIRS criteria  Hyperthemia > 38 3C (100 9F); Tachypnea > 20 resp per min  -EB        If the answer is yes to both questions, suspicion of sepsis is present  --        If severe sepsis is present AND tissue hypoperfusion perists in the hour after fluid resuscitation or lactate > 4, the patient meets criteria for SEPTIC SHOCK  --        Are any of the following organ dysfunction criteria present within 6 hours of suspected infection and SIRS criteria that are NOT considered to be chronic conditions? (!) Yes  -EB        Organ dysfunction  Lactate > 2 0 mmol/L  -EB        Date of presentation of severe sepsis  04/15/21  -EB        Time of presentation of severe sepsis  0718  -EB        Tissue hypoperfusion persists in the hour after crystalloid fluid administration, evidenced, by either:  --        Was hypotension present within one hour of the conclusion of crystalloid fluid administration?   No  -EB        Date of presentation of septic shock  --        Time of presentation of septic shock  --          User Key  (r) = Recorded By, (t) = Taken By, (c) = Cosigned By    [address] Name Provider Sherly Tamez MD Physician

## 2021-04-15 NOTE — ASSESSMENT & PLAN NOTE
 Mild COVID pathway as below    COVID19- positive on 4/01/2021   Supplemental oxygen with  2 L saturating 94-96%  o Wean as tolerated for oxygen saturation greater than 92%   CXR revealed " Mild progression of bilateral groundglass opacities "  Chest CT scan revealed "No pulmonary embolus  Extensive new bilateral groundglass opacity and septal thickening, likely a combination of Covid-19 pneumonia and edema  Redemonstration of juxtapleural nodules  The previous parenchymal nodules are not well demonstrated due to the new groundglass opacity  Persistent mediastinal lymphadenopathy  These abnormalities should be reevaluated in 3 months, after clearing of acute infection   "    CBC and CMP daily   Cardiac markers: troponin, CK, BNP   Inflammatory markers: CRP, D-Dimer, and ferritin   Vitamin D3 2000 IU daily, vitamin C 1g PO BID, zinc 220mg PO daily    Will start on dexamethasone and Pepcid   Patient recently completed the course of remdesivir   Consideration for convalescent plasma therapy as symptoms having greater than 5 days   OOB TID; ambulation and mobilization strongly encouraged   PT consult and evaluation    Self proning strongly encouraged   Supportive care   Continue on Levaquin and doxycycline  Will trend procalcitonin    If negative x2 will discontinue antibiotics

## 2021-04-15 NOTE — H&P
Otto Macias  H&P- Jaelyn Herring 1945, 77 y o  male MRN: [id number]  Unit/Bed#: -01 Encounter: [id number]  Primary Care Provider: Sandi Pop DO   Date and time admitted to hospital: 4/15/2021  5:43 AM    Hypokalemia  Assessment & Plan  Potassium supplementation  Monitor and replace electrolytes as needed    Severe sepsis [facility])  Assessment & Plan  Patient presented with fever, tachycardia, tachypnea, leukocytosis and lactate of 3 6  Admitted with severe sepsis  Trend lactate q 2 hours  Received IV fluid in ER  Continue on antibiotics  Trend procalcitonin  Follow-up culture results  See above    Gastroesophageal reflux disease with esophagitis  Assessment & Plan  On Pepcid    Acute respiratory failure with hypoxia [facility])  Assessment & Plan  Patient is requiring 2 L of supplemental oxygen  Likely secondary to COVID-19 pneumonia  Wean oxygen as tolerated  Continue on IV antibiotic  See above    Essential hypertension  Assessment & Plan  Patient takes Lasix, Toprol-XL and Zestril at home  Will continue on Toprol-XL and Lasix  Monitor vitals as per protocol    Dyslipidemia  Assessment & Plan  On statin    Type 2 diabetes mellitus without complication, without long-term current use of insulin (HCC)  Assessment & Plan  Lab Results   Component Value Date    HGBA1C 5 8 (H) 12/30/2020       No results for input(s): POCGLU in the last 72 hours  Blood Sugar Average: Last 72 hrs:   hold metformin  Accu-Chek a c  HS with Humalog sliding scale  * Pneumonia due to COVID-19 virus  Assessment & Plan   Mild COVID pathway as below    COVID19- positive on 4/01/2021   Supplemental oxygen with  2 L saturating 94-96%  o Wean as tolerated for oxygen saturation greater than 92%   CXR revealed " Mild progression of bilateral groundglass opacities "  Chest CT scan revealed "No pulmonary embolus  Extensive new bilateral groundglass opacity and septal thickening, likely a combination of Covid-19 pneumonia and edema  Redemonstration of juxtapleural nodules  The previous parenchymal nodules are not well demonstrated due to the new groundglass opacity  Persistent mediastinal lymphadenopathy  These abnormalities should be reevaluated in 3 months, after clearing of acute infection   "    CBC and CMP daily   Cardiac markers: troponin, CK, BNP   Inflammatory markers: CRP, D-Dimer, and ferritin   Vitamin D3 2000 IU daily, vitamin C 1g PO BID, zinc 220mg PO daily    Will start on dexamethasone and Pepcid   Patient recently completed the course of remdesivir   Consideration for convalescent plasma therapy as symptoms having greater than 5 days   OOB TID; ambulation and mobilization strongly encouraged   PT consult and evaluation    Self proning strongly encouraged   Supportive care   Continue on Levaquin and doxycycline  Will trend procalcitonin  If negative x2 will discontinue antibiotics    Anticipated length of stay greater than 2 midnights    Chief Complaint   Patient presents with    Shortness of Breath        HPI:  Adrián Olea is a 77 y o  male was diagnosed with COVID infection on April 1st and was admitted to this hospital with COVID pneumonia on April 6th approximately 1 week after receiving his 2nd COVID vaccination  Patient had completed the course of remdesivir during this admission and was discharged on April 10th  Patient states that he was doing well and was saturating in low 90s at home over the last couple of days he started having worsening shortness of breath which is worse with exertion and states he has been having some cough which is productive of clear sputum along with some blood-tinged mucus occasionally  Patient this morning was having shortness of breath and came to ER  In ER patient was found to have pulse ox of 86% on room air and was placed on 2 L of supplemental oxygen  Patient was tachypneic, tachycardic and febrile    Denies any chest pain, nausea, vomiting, dizziness, abdominal pain, diarrhea  Does complain of having generalized weakness and fatigue and poor appetite    In ER patient was started on IV antibiotics and was given dexamethasone    Historical Information   Past Medical History:   Diagnosis Date    Arthritis     Atrial fibrillation ([facility] )     Cataract     Colitis     Colon cancer ([place] [address] )     Diabetes mellitus type II, non insulin dependent ([place] [address] )     Fatty liver     History of chemotherapy     History of radiation therapy     History of shingles     Hypertension     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[facility] )     Pneumonia     Skin cancer      Past Surgical History:   Procedure Laterality Date    AORTIC VALVE REPLACEMENT      COLECTOMY      COLONOSCOPY  2019    Prior right hemicolectomy    CORONARY ARTERY BYPASS GRAFT      DENTAL SURGERY      KNEE SURGERY      LYMPHADENECTOMY      OTHER SURGICAL HISTORY      MAZE PROCEDURE    WA TOTAL KNEE ARTHROPLASTY Left 2019    Procedure: ARTHROPLASTY LEFT KNEE TOTAL;  Surgeon: Chemo Alberts MD;  Location: [facility] OR;  Service: Orthopedics    SKIN BIOPSY      UPPER GASTROINTESTINAL ENDOSCOPY  2019    Schatzki ring     Social History   Social History     Substance and Sexual Activity   Alcohol Use Yes    Frequency: Monthly or less    Drinks per session: 1 or 2    Binge frequency: Never    Comment: very rare "beer here and there"     Social History     Substance and Sexual Activity   Drug Use No     Social History     Tobacco Use   Smoking Status Former Smoker    Packs/day: 1 00    Years: 40 00    Pack years: 40 00    Types: Cigarettes    Quit date:     Years since quittin 2   Smokeless Tobacco Never Used     Family History   Problem Relation Age of Onset    Heart block Family     Coronary artery disease Mother     Thrombosis Mother     Hypertension Mother     Arthritis Mother     Hyperlipidemia Mother     Diabetes Father     Hypertension Father     Arthritis Father    Arabella Jaimee Sudden death Father         cardiac    Colon cancer Paternal Grandfather     Breast cancer Sister     Heart disease Brother         Coronary stents       Meds/Allergies   Allergies   Allergen Reactions    Ceftin [Cefuroxime] Itching     However, has tolerated Cefazolin and Pip-Tazo since, which have different side chains  Be cautious with / avoid 2nd, 3rd, or 4th Gen Cephs that have similar side chains to Cefuroxime         Meds:    Current Facility-Administered Medications:     acetaminophen (TYLENOL) tablet 650 mg, 650 mg, Oral, Q6H PRN, Joon Armas MD    ascorbic acid (VITAMIN C) tablet 1,000 mg, 1,000 mg, Oral, Q12H [address], Joon Armas MD    aspirin (ECOTRIN LOW STRENGTH) EC tablet 81 mg, 81 mg, Oral, Daily, Joon Armas MD    atorvastatin (LIPITOR) tablet 40 mg, 40 mg, Oral, Daily, Joon Armas MD    cefTRIAXone (ROCEPHIN) IVPB (premix in dextrose) 1,000 mg 50 mL, 1,000 mg, Intravenous, Q24H, Joon Armas MD    cholecalciferol (VITAMIN D3) tablet 2,000 Units, 2,000 Units, Oral, Daily, Joon Armas MD    dexamethasone (DECADRON) injection 6 mg, 6 mg, Intravenous, Q24H, Joon Armas MD    doxycycline hyclate (VIBRAMYCIN) capsule 100 mg, 100 mg, Oral, Q12H [address], Joon Armas MD    enoxaparin (LOVENOX) subcutaneous injection 30 mg, 30 mg, Subcutaneous, Q12H [address], Joon Armas MD    famotidine (PEPCID) tablet 20 mg, 20 mg, Oral, BID, Joon Armas MD    [START ON 4/16/2021] ferrous sulfate tablet 325 mg, 325 mg, Oral, Daily With Breakfast, Joon Armas MD    levofloxacin (LEVAQUIN) IVPB (premix in dextrose) 750 mg 150 mL, 750 mg, Intravenous, Q24H, Joon Armas MD, Stopped at 04/15/21 0829    mesalamine (ROWASA) enema 4 g, 4 g, Rectal, HS, Joon Armas MD    metoprolol succinate (TOPROL-XL) 24 hr tablet 100 mg, 100 mg, Oral, Daily, Joon Armas MD    zinc sulfate (ZINCATE) capsule 220 mg, 220 mg, Oral, Daily **FOLLOWED BY** [START ON 4/22/2021] multivitamin-minerals (CENTRUM ADULTS) tablet 1 tablet, 1 tablet, Oral, Daily, Katrina Evans MD    ondansetron (ZOFRAN) injection 4 mg, 4 mg, Intravenous, Q6H PRN, Katrina Evans MD    remdesivir [facility]) 200 mg in sodium chloride 0 9 % 250 mL IVPB, 200 mg, Intravenous, Q24H **FOLLOWED BY** [START ON 4/16/2021] remdesivir (Veklury) 100 mg in sodium chloride 0 9 % 250 mL IVPB, 100 mg, Intravenous, Q24H, Katrina Evans MD    saccharomyces boulardii (FLORASTOR) capsule 250 mg, 250 mg, Oral, BID, Katrina Evans MD    Medications Prior to Admission   Medication    aspirin 81 MG tablet    atorvastatin (LIPITOR) 40 mg tablet    cholecalciferol (VITAMIN D3) 1,000 units tablet    diphenoxylate-atropine (LOMOTIL) 2 5-0 025 mg per tablet    ferrous sulfate 324 MG TBEC    furosemide (LASIX) 20 mg tablet    lisinopril (ZESTRIL) 20 mg tablet    mesalamine (ROWASA) 4 g    metFORMIN (GLUCOPHAGE-XR) 500 mg 24 hr tablet    metoprolol succinate (TOPROL-XL) 100 mg 24 hr tablet    multivitamin-minerals (CENTRUM ADULTS) tablet    nitroglycerin (NITROSTAT) 0 3 mg SL tablet    omeprazole (PriLOSEC) 40 MG capsule    predniSONE 10 mg tablet    saccharomyces boulardii (FLORASTOR) 250 mg capsule         Review of Systems   Constitutional: Positive for activity change, appetite change, fatigue and fever  HENT: Negative  Eyes: Negative  Respiratory: Positive for cough and shortness of breath  Cardiovascular: Negative  Gastrointestinal: Negative  Endocrine: Negative  Genitourinary: Negative  Musculoskeletal: Negative  Skin: Negative  Allergic/Immunologic: Negative  Neurological: Negative  Hematological: Negative  Psychiatric/Behavioral: Negative          Current Vitals:   Blood Pressure: 111/53 (04/15/21 0951)  Pulse: 73 (04/15/21 0815)  Temperature: 99 °F (37 2 °C) (04/15/21 0951)  Temp Source: Oral (04/15/21 0546)  Respirations: 16 (04/15/21 0951)  Height: 5' 11" (180 3 cm) (04/15/21 0546)  Weight - Scale: 76 8 kg (169 lb 5 oz) (04/15/21 0546)  SpO2: 96 % (04/15/21 0951)  SPO2 RA Rest      ED to Hosp-Admission (Current) from 4/15/2021 in [address] Med Surg Unit   SpO2  96 %   SpO2 Activity  At Rest   O2 Device  Nasal cannula   O2 Flow Rate  --          Intake/Output Summary (Last 24 hours) at 4/15/2021 1116  Last data filed at 4/15/2021 0829  Gross per 24 hour   Intake 650 ml   Output --   Net 650 ml     Body mass index is 23 61 kg/m²  Physical Exam  Vitals signs and nursing note reviewed  Constitutional:       General: He is not in acute distress  Appearance: He is well-developed  HENT:      Head: Normocephalic and atraumatic  Eyes:      General: No scleral icterus  Conjunctiva/sclera: Conjunctivae normal       Pupils: Pupils are equal, round, and reactive to light  Neck:      Musculoskeletal: Normal range of motion and neck supple  Thyroid: No thyromegaly  Vascular: No JVD  Cardiovascular:      Rate and Rhythm: Normal rate and regular rhythm  Heart sounds: Normal heart sounds  Pulmonary:      Effort: Respiratory distress present  Breath sounds: Rhonchi present  No wheezing or rales  Comments: Decreased at bases with some rhonchi present  Chest:      Chest wall: No tenderness  Abdominal:      General: Bowel sounds are normal  There is no distension  Palpations: Abdomen is soft  There is no mass  Tenderness: There is no abdominal tenderness  There is no guarding or rebound  Musculoskeletal: Normal range of motion  General: No tenderness or deformity  Skin:     General: Skin is warm  Coloration: Skin is not pale  Findings: No erythema or rash  Neurological:      General: No focal deficit present  Mental Status: He is alert and oriented to person, place, and time  Cranial Nerves: No cranial nerve deficit        Coordination: Coordination normal    Psychiatric: Behavior: Behavior normal          Judgment: Judgment normal          Lab Results:   CBC:   Lab Results   Component Value Date    WBC 14 15 (H) 04/15/2021    HGB 10 6 (L) 04/15/2021    HCT 34 9 (L) 04/15/2021    MCV 79 (L) 04/15/2021    PLT  04/15/2021      Comment:      Not reported due to platelet clumping  MCH 24 1 (L) 04/15/2021    MCHC 30 4 (L) 04/15/2021    RDW 20 5 (H) 04/15/2021    MPV 11 4 04/15/2021    NRBC 0 04/15/2021     CMP:  Lab Results   Component Value Date     05/19/2017    CL 96 (L) 04/15/2021    CL 99 03/12/2021    CO2 31 04/15/2021    CO2 31 (H) 03/12/2021    ANIONGAP 7 02/22/2014    BUN 19 04/15/2021    BUN 14 03/12/2021    CREATININE 1 11 04/15/2021    CREATININE 0 93 05/19/2017    GLUCOSE 204 (H) 10/13/2017    CALCIUM 8 7 04/15/2021    CALCIUM 9 3 05/19/2017    AST 15 04/15/2021    AST 12 12/30/2020    ALT 22 04/15/2021    ALT 9 12/30/2020    ALKPHOS 124 (H) 04/15/2021    ALKPHOS 97 05/19/2017    PROT 6 5 05/19/2017    BILITOT 0 9 05/19/2017    EGFR 65 04/15/2021     Lab Results   Component Value Date    TROPONINI 0 05 (H) 04/15/2021    CKMB 1 7 04/07/2021    CKTOTAL 24 (L) 04/15/2021     Coagulation:   Lab Results   Component Value Date    INR 1 21 (H) 04/15/2021    APTT 35 (H) 01/08/2019    Urinalysis:  Lab Results   Component Value Date    COLORU Yellow 04/15/2021    CLARITYU Clear 04/15/2021    SPECGRAV 1 010 04/15/2021    PHUR 6 5 04/15/2021    PHUR 6 0 02/27/2019    LEUKOCYTESUR Negative 04/15/2021    NITRITE Negative 04/15/2021    GLUCOSEU Negative 04/15/2021    KETONESU Negative 04/15/2021    BILIRUBINUR Negative 04/15/2021    BLOODU Negative 04/15/2021      Amylase: No results found for: AMYLASE  Lipase:   Lab Results   Component Value Date    LIPASE 173 11/25/2018        Imaging: Xr Chest Portable    Result Date: 4/15/2021  Narrative: CHEST INDICATION:   Acute Resp Failure  Patient has confirmed COVID-19   COMPARISON:  April 6, 2021 EXAM PERFORMED/VIEWS:  XR CHEST PORTABLE FINDINGS:  There are median sternotomy wires indicating prior cardiac surgery  Cardiomediastinal silhouette appears unremarkable  Bilateral groundglass opacities which have progressed mildly from the prior study  No pleural effusion or pneumothorax  Osseous structures appear within normal limits for patient age  Impression: Mild progression of bilateral groundglass opacities  In the setting of clinically suspected/proven COVID-19, this plain film appearance while nonspecific, can be seen in cases of viral pneumonia such as COVID-19  Workstation performed: [id number]     Xr Chest 1 View Portable    Result Date: 4/6/2021  Narrative: CHEST INDICATION:   covid, sob  Patient has confirmed COVID-19  Positive on 4/1/2021  COMPARISON:  Chest radiograph from 9/11/2019 and chest CT from 3/26/2021  EXAM PERFORMED/VIEWS:  XR CHEST PORTABLE  FINDINGS: Cardiomediastinal silhouette normal  AVR  Extensive bilateral groundglass opacity, greater on the right  No effusion or pneumothorax  Mild degenerative disease in the spine and involving both glenohumeral joints  Impression: Extensive bilateral ground was opacity, greater on the right, due to Covid 19 pneumonia  Workstation performed: [id number]     Ct Chest Abdomen Pelvis W Contrast    Result Date: 4/1/2021  Narrative: CT CHEST, ABDOMEN AND PELVIS WITH IV CONTRAST INDICATION:   C85 90: Non-Hodgkin lymphoma, unspecified, unspecified site  COMPARISON:  CT chest dated 9/24/2020, CT chest/abdomen/pelvis dated 2/27/2019  TECHNIQUE: CT examination of the chest, abdomen and pelvis was performed  Axial, sagittal, and coronal 2D reformatted images were created from the source data and submitted for interpretation  Radiation dose length product (DLP) for this visit:  1025 34 mGy-cm     This examination, like all CT scans performed in the [facility], was performed utilizing techniques to minimize radiation dose exposure, including the use of iterative reconstruction and automated exposure control  IV Contrast:  100 mL of iohexol (OMNIPAQUE) Enteric Contrast: Enteric contrast was administered  FINDINGS: CHEST LUNGS:  Multiple new bilateral pulmonary nodules are seen  Index lesions are as follows: 1 2 x 0 8 cm left lower lobe nodule (image 86, series 5) 1 3 x 0 6 cm left upper lobe subpleural nodule (image 37, series 5) Clustered tree-in-bud nodules in bilateral upper lobes consistent with endobronchial impaction/infection  8 mm subpleural right upper lobe nodule (image 41, series 5) There is no tracheal or endobronchial lesion  PLEURA:  Small left pleural effusion  HEART/GREAT VESSELS:  Unremarkable for patient's age  MEDIASTINUM AND TERRI:  There is interval enlargement of mediastinal lymph nodes  For example, a precarinal lymph node now measures 2 7 x 2 0 cm (image 46, series 5), previously 1 9 x 1 5 cm  In another example, an aorticopulmonary window lymph node now  measures 1 4 x 0 7 cm (image 42, series 5), previously 0 9 x 0 4 cm  CHEST WALL AND LOWER NECK:   Unremarkable  ABDOMEN LIVER/BILIARY TREE:  Scattered subcentimeter hypodensities in the liver are too small to characterize, probably cysts  GALLBLADDER:  There are gallstone(s) within the gallbladder, without pericholecystic inflammatory changes  SPLEEN:  Unremarkable  PANCREAS:  Unremarkable  ADRENAL GLANDS:  Unremarkable  KIDNEYS/URETERS:  2 8 cm cyst arises from the lower pole of the left kidney  There is mild right-sided hydroureteronephrosis without obstructing calculus identified  STOMACH AND BOWEL:  No evidence for bowel obstruction  There is significant circumferential wall thickening of the rectum with greater severity when compared to study from 2019  Lack of contrast within the rectum limits evaluation  Extensive perirectal inflammatory changes are suggestive of proctocolitis although underlying neoplastic etiology cannot be excluded  APPENDIX:  No findings to suggest appendicitis  ABDOMINOPELVIC CAVITY:  No ascites  No pneumoperitoneum  There is new retroperitoneal lymphadenopathy  For example, a retrocaval lymph node measures 2 1 x 2 3 cm (image 80, series 4)  VESSELS:  Atherosclerotic changes are present  No evidence of aneurysm  PELVIS REPRODUCTIVE ORGANS:  Unremarkable for patient's age  URINARY BLADDER:  There is wall thickening of the urinary bladder, which may be due to cystitis versus secondary inflammation from the adjacent rectum  ABDOMINAL WALL/INGUINAL REGIONS:  Right inguinal hernia containing fat and small fluid  OSSEOUS STRUCTURES:  No acute fracture or destructive osseous lesion  Impression: Multiple new bilateral pulmonary nodules as described above;  the differential diagnosis includes but is not limited to new lymphomatous involvement and metastatic disease  Bilateral upper lobe clustered tree-in-bud opacities consistent with endobronchial impaction/infection  Small left pleural effusion  Interval progression of mediastinal lymphadenopathy  Interval progression of retroperitoneal lymphadenopathy  Significant circumferential rectal wall thickening with greater severity when compared to study from 2019  Lack of contrast within the rectum limits evaluation  Extensive perirectal inflammatory changes suggestive of proctocolitis although underlying neoplastic etiology cannot be excluded  Gastroenterology evaluation advised  Wall thickening of the urinary bladder may be due to cystitis versus secondary inflammation from the adjacent rectum  Mild right-sided hydroureteronephrosis to the level of the urinary bladder without obstructing calculus, likely secondary to above-mentioned bladder process  The study was marked in [facility] for immediate notification  Workstation performed: [id number]     Cta Ed Chest Pe Study    Result Date: 4/15/2021  Narrative: CTA - CHEST WITH IV CONTRAST - PULMONARY ANGIOGRAM INDICATION:   SOB, covid, elevated dimer    History of colon cancer and multiple myeloma  Patient has confirmed COVID-19  Positive on 4/6/2021  COMPARISON: Chest radiograph from 4/15/2021 and chest CT from 3/26/2021  TECHNIQUE: CT angiogram timed for optimal opacification of the pulmonary arteries  Axial, sagittal, and coronal 2D reformats created from source data  Coronal 3D MIP postprocessing on the acquisition scanner  Radiation dose length product (DLP):  377 mGy-cm   Radiation dose exposure minimized using iterative reconstruction and automated exposure control  IV Contrast:  85 mL of iohexol (OMNIPAQUE)  FINDINGS: PULMONARY ARTERIES:  No pulmonary embolus  LUNGS:  Extensive new bilateral groundglass opacity and septal thickening  Previous lung nodules poorly demonstrated  Persistence of 1 5 cm and smaller juxtapleural nodules in the right upper lobe (3/29, 43) and left upper lobe (3/42)  Benign calcified  granulomas  AIRWAYS: No significant filling defects  PLEURA:  Persistent small left pleural effusion  Increase in small right pleural effusion  HEART/GREAT VESSELS:  Normal heart size  AVR  CABG  MEDIASTINUM AND TERRI:  Redemonstration of enlarged precarinal and subcarinal nodes  CHEST WALL AND LOWER NECK: Unremarkable  UPPER ABDOMEN:  Hepatic cysts  OSSEOUS STRUCTURES:  Mild degenerative disease in the spine and both glenohumeral joints  Impression: No pulmonary embolus  Extensive new bilateral groundglass opacity and septal thickening, likely a combination of Covid-19 pneumonia and edema  Redemonstration of juxtapleural nodules  The previous parenchymal nodules are not well demonstrated due to the new groundglass opacity  Persistent mediastinal lymphadenopathy  These abnormalities should be reevaluated in 3 months, after clearing of acute infection  Small bilateral pleural effusions, greater on the left  The right effusion has increased slightly from 3/26/2021  This study was marked for significant notification and follow-up    Workstation performed: [id number] Xr Outside Images    Result Date: 4/1/2021  Narrative: 1 2 [id number]  4 956 [id number]    EKG, Pathology, and Other Studies: I have personally reviewed the results  VTE Pharmacologic Prophylaxis: Enoxaparin (Lovenox)  VTE Mechanical Prophylaxis: sequential compression device    Code Status: Level 1 - Full Code    Counseling / Coordination of Care  Total floor / unit time spent today 75 minutes  Greater than 50% of total time was spent with the patient and / or family counseling and / or coordination of care       "This note has been constructed using a voice recognition system"      Gentry Christiansen MD  4/15/2021, 11:16 AM

## 2021-04-15 NOTE — ASSESSMENT & PLAN NOTE
Patient presented with fever, tachycardia, tachypnea, leukocytosis and lactate of 3 6  Admitted with severe sepsis  Trend lactate q 2 hours  Received IV fluid in ER  Continue on antibiotics  Trend procalcitonin  Follow-up culture results  See above

## 2021-04-15 NOTE — PLAN OF CARE
Problem: Potential for Falls  Goal: Patient will remain free of falls  Description: INTERVENTIONS:  - Assess patient frequently for physical needs  -  Identify cognitive and physical deficits and behaviors that affect risk of falls    -  [place] fall precautions as indicated by assessment   - Educate patient/family on patient safety including physical limitations  - Instruct patient to call for assistance with activity based on assessment  - Modify environment to reduce risk of injury  - Consider OT/PT consult to assist with strengthening/mobility  Outcome: Progressing

## 2021-04-16 LAB
ALBUMIN SERPL BCP-MCNC: 2.4 G/DL (ref 3.5–5)
ALP SERPL-CCNC: 98 U/L (ref 46–116)
ALT SERPL W P-5'-P-CCNC: 20 U/L (ref 12–78)
ANION GAP SERPL CALCULATED.3IONS-SCNC: 5 MMOL/L (ref 4–13)
AST SERPL W P-5'-P-CCNC: 21 U/L (ref 5–45)
BASOPHILS # BLD AUTO: 0.01 THOUSANDS/ΜL (ref 0–0.1)
BASOPHILS NFR BLD AUTO: 0 % (ref 0–1)
BILIRUB SERPL-MCNC: 0.9 MG/DL (ref 0.2–1)
BUN SERPL-MCNC: 20 MG/DL (ref 5–25)
CALCIUM ALBUM COR SERPL-MCNC: 9.9 MG/DL (ref 8.3–10.1)
CALCIUM SERPL-MCNC: 8.6 MG/DL (ref 8.3–10.1)
CHLORIDE SERPL-SCNC: 99 MMOL/L (ref 100–108)
CO2 SERPL-SCNC: 34 MMOL/L (ref 21–32)
CREAT SERPL-MCNC: 0.97 MG/DL (ref 0.6–1.3)
CRP SERPL QL: 98.6 MG/L
D DIMER PPP FEU-MCNC: 0.92 UG/ML FEU
EOSINOPHIL # BLD AUTO: 0.01 THOUSAND/ΜL (ref 0–0.61)
EOSINOPHIL NFR BLD AUTO: 0 % (ref 0–6)
ERYTHROCYTE [DISTWIDTH] IN BLOOD BY AUTOMATED COUNT: 20 % (ref 11.6–15.1)
FERRITIN SERPL-MCNC: 308 NG/ML (ref 8–388)
GFR SERPL CREATININE-BSD FRML MDRD: 77 ML/MIN/1.73SQ M
GLUCOSE SERPL-MCNC: 162 MG/DL (ref 65–140)
GLUCOSE SERPL-MCNC: 166 MG/DL (ref 65–140)
GLUCOSE SERPL-MCNC: 233 MG/DL (ref 65–140)
GLUCOSE SERPL-MCNC: 245 MG/DL (ref 65–140)
GLUCOSE SERPL-MCNC: 302 MG/DL (ref 65–140)
HCT VFR BLD AUTO: 34 % (ref 36.5–49.3)
HGB BLD-MCNC: 10.1 G/DL (ref 12–17)
IMM GRANULOCYTES # BLD AUTO: 0.11 THOUSAND/UL (ref 0–0.2)
IMM GRANULOCYTES NFR BLD AUTO: 1 % (ref 0–2)
INR PPP: 1.27 (ref 0.84–1.19)
LIPASE SERPL-CCNC: 131 U/L (ref 73–393)
LYMPHOCYTES # BLD AUTO: 0.58 THOUSANDS/ΜL (ref 0.6–4.47)
LYMPHOCYTES NFR BLD AUTO: 4 % (ref 14–44)
MAGNESIUM SERPL-MCNC: 2.2 MG/DL (ref 1.6–2.6)
MCH RBC QN AUTO: 23.9 PG (ref 26.8–34.3)
MCHC RBC AUTO-ENTMCNC: 29.7 G/DL (ref 31.4–37.4)
MCV RBC AUTO: 81 FL (ref 82–98)
MONOCYTES # BLD AUTO: 1.11 THOUSAND/ΜL (ref 0.17–1.22)
MONOCYTES NFR BLD AUTO: 8 % (ref 4–12)
NEUTROPHILS # BLD AUTO: 12.62 THOUSANDS/ΜL (ref 1.85–7.62)
NEUTS SEG NFR BLD AUTO: 87 % (ref 43–75)
NRBC BLD AUTO-RTO: 0 /100 WBCS
PHOSPHATE SERPL-MCNC: 2.9 MG/DL (ref 2.3–4.1)
POTASSIUM SERPL-SCNC: 4.2 MMOL/L (ref 3.5–5.3)
PROCALCITONIN SERPL-MCNC: <0.05 NG/ML
PROT SERPL-MCNC: 5.9 G/DL (ref 6.4–8.2)
PROTHROMBIN TIME: 15.9 SECONDS (ref 11.6–14.5)
RBC # BLD AUTO: 4.22 MILLION/UL (ref 3.88–5.62)
SODIUM SERPL-SCNC: 138 MMOL/L (ref 136–145)
TSH SERPL DL<=0.05 MIU/L-ACNC: 1.52 UIU/ML (ref 0.36–3.74)
WBC # BLD AUTO: 14.44 THOUSAND/UL (ref 4.31–10.16)

## 2021-04-16 PROCEDURE — 85025 COMPLETE CBC W/AUTO DIFF WBC: CPT | Performed by: INTERNAL MEDICINE

## 2021-04-16 PROCEDURE — 83690 ASSAY OF LIPASE: CPT | Performed by: INTERNAL MEDICINE

## 2021-04-16 PROCEDURE — 83735 ASSAY OF MAGNESIUM: CPT | Performed by: INTERNAL MEDICINE

## 2021-04-16 PROCEDURE — 85379 FIBRIN DEGRADATION QUANT: CPT | Performed by: INTERNAL MEDICINE

## 2021-04-16 PROCEDURE — 86140 C-REACTIVE PROTEIN: CPT | Performed by: INTERNAL MEDICINE

## 2021-04-16 PROCEDURE — 85610 PROTHROMBIN TIME: CPT | Performed by: INTERNAL MEDICINE

## 2021-04-16 PROCEDURE — 99232 SBSQ HOSP IP/OBS MODERATE 35: CPT | Performed by: INTERNAL MEDICINE

## 2021-04-16 PROCEDURE — 84100 ASSAY OF PHOSPHORUS: CPT | Performed by: INTERNAL MEDICINE

## 2021-04-16 PROCEDURE — 82728 ASSAY OF FERRITIN: CPT | Performed by: INTERNAL MEDICINE

## 2021-04-16 PROCEDURE — 84443 ASSAY THYROID STIM HORMONE: CPT | Performed by: INTERNAL MEDICINE

## 2021-04-16 PROCEDURE — 80053 COMPREHEN METABOLIC PANEL: CPT | Performed by: INTERNAL MEDICINE

## 2021-04-16 PROCEDURE — 84145 PROCALCITONIN (PCT): CPT | Performed by: EMERGENCY MEDICINE

## 2021-04-16 PROCEDURE — 82948 REAGENT STRIP/BLOOD GLUCOSE: CPT

## 2021-04-16 RX ORDER — MAGNESIUM SULFATE HEPTAHYDRATE 40 MG/ML
2 INJECTION, SOLUTION INTRAVENOUS ONCE
Status: COMPLETED | OUTPATIENT
Start: 2021-04-16 | End: 2021-04-16

## 2021-04-16 RX ADMIN — ZINC SULFATE 220 MG (50 MG) CAPSULE 220 MG: CAPSULE at 08:02

## 2021-04-16 RX ADMIN — ENOXAPARIN SODIUM 30 MG: 30 INJECTION SUBCUTANEOUS at 21:27

## 2021-04-16 RX ADMIN — FERROUS SULFATE TAB 325 MG (65 MG ELEMENTAL FE) 325 MG: 325 (65 FE) TAB at 08:02

## 2021-04-16 RX ADMIN — Medication 250 MG: at 17:47

## 2021-04-16 RX ADMIN — METOPROLOL SUCCINATE 100 MG: 50 TABLET, EXTENDED RELEASE ORAL at 08:03

## 2021-04-16 RX ADMIN — DEXAMETHASONE SODIUM PHOSPHATE 6 MG: 4 INJECTION, SOLUTION INTRA-ARTICULAR; INTRALESIONAL; INTRAMUSCULAR; INTRAVENOUS; SOFT TISSUE at 08:04

## 2021-04-16 RX ADMIN — INSULIN LISPRO 8 UNITS: 100 INJECTION, SOLUTION INTRAVENOUS; SUBCUTANEOUS at 17:48

## 2021-04-16 RX ADMIN — ASPIRIN 81 MG: 81 TABLET, COATED ORAL at 08:04

## 2021-04-16 RX ADMIN — INSULIN LISPRO 4 UNITS: 100 INJECTION, SOLUTION INTRAVENOUS; SUBCUTANEOUS at 11:31

## 2021-04-16 RX ADMIN — OXYCODONE HYDROCHLORIDE AND ACETAMINOPHEN 1000 MG: 500 TABLET ORAL at 21:26

## 2021-04-16 RX ADMIN — LEVOFLOXACIN 750 MG: 5 INJECTION, SOLUTION INTRAVENOUS at 05:54

## 2021-04-16 RX ADMIN — FAMOTIDINE 20 MG: 20 TABLET ORAL at 08:05

## 2021-04-16 RX ADMIN — DOXYCYCLINE 100 MG: 100 CAPSULE ORAL at 08:02

## 2021-04-16 RX ADMIN — Medication 2000 UNITS: at 08:04

## 2021-04-16 RX ADMIN — ENOXAPARIN SODIUM 30 MG: 30 INJECTION SUBCUTANEOUS at 08:02

## 2021-04-16 RX ADMIN — MAGNESIUM SULFATE HEPTAHYDRATE 2 G: 40 INJECTION, SOLUTION INTRAVENOUS at 05:57

## 2021-04-16 RX ADMIN — FUROSEMIDE 20 MG: 20 TABLET ORAL at 08:03

## 2021-04-16 RX ADMIN — FAMOTIDINE 20 MG: 20 TABLET ORAL at 17:47

## 2021-04-16 RX ADMIN — OXYCODONE HYDROCHLORIDE AND ACETAMINOPHEN 1000 MG: 500 TABLET ORAL at 08:03

## 2021-04-16 RX ADMIN — ATORVASTATIN CALCIUM 40 MG: 40 TABLET, FILM COATED ORAL at 08:03

## 2021-04-16 RX ADMIN — DOXYCYCLINE 100 MG: 100 CAPSULE ORAL at 21:26

## 2021-04-16 RX ADMIN — INSULIN LISPRO 2 UNITS: 100 INJECTION, SOLUTION INTRAVENOUS; SUBCUTANEOUS at 07:07

## 2021-04-16 RX ADMIN — Medication 250 MG: at 08:04

## 2021-04-16 RX ADMIN — FUROSEMIDE 20 MG: 20 TABLET ORAL at 17:47

## 2021-04-16 NOTE — UTILIZATION REVIEW
Initial Clinical Review    Admission: Date/Time/Statement:   Admission Orders (From admission, onward)     Ordered        04/15/21 0736  Inpatient Admission  Once                   Orders Placed This Encounter   Procedures    Inpatient Admission     Standing Status:   Standing     Number of Occurrences:   1     Order Specific Question:   Level of Care     Answer:   Med Surg [16]     Order Specific Question:   Estimated length of stay     Answer:   More than 2 Midnights     Order Specific Question:   Certification     Answer:   I certify that inpatient services are medically necessary for this patient for a duration of greater than two midnights  See H&P and MD Progress Notes for additional information about the patient's course of treatment  ED Arrival Information     Expected Arrival Acuity Means of Arrival Escorted By Service Admission Type    - 4/15/2021 05:34 Urgent Walk-In Self General Medicine Urgent    Arrival Complaint    Shortness Of Breath        Chief Complaint   Patient presents with    Shortness of Breath       Initial Presentation:  76 yo male presented to ED from home as inpatient admission for PNA with (+) COVID-19  PMHx A-Fib,not on any anticoagulation, GERD,HTN,dyslipidemia DM type 2   Patient dx with COVID PNA [id number] 1 week after receiving his 2nd COVID vaccination, subsequently got admitted for hypoxia requiring 2 L nasal cannula and was discharged on April 10th  After 2nd dose ~ 1 week later   He states that he was diagnosed with COVID Patient c/o of increased SOB with exertion, (+) cough productive blood tinge mucus,fatigue On arrival th ED O2 sat 86 % and placed on 2 L NC On exam decreased breath sounds, (+) RDS abd rhonchi present  Plan O2 as needed IV antibiotics, IV steroids home medication monitor inflammatory marker, and supportive care        Date: 04-16-21 Day 2: Patient with run of V tach IV MGSO 4 given  Patient still remains SOB and (+) Cough,(+) rhonchi Continue 2 L NC O2 or adjust as needed > 88 %  IV antibiotics and steroids trend lab work hypokalemia resolved CRP slighted up D-dimer decreasing lactic acid decreasing,encourage self proning     ED Triage Vitals [04/15/21 0546]   Temperature Pulse Respirations Blood Pressure SpO2   (!) 101 3 °F (38 5 °C) 97 22 (!) 184/80 (!) 86 %      Temp Source Heart Rate Source Patient Position - Orthostatic VS BP Location FiO2 (%)   Oral Monitor Lying Right arm --      Pain Score       No Pain         Additional Vital Signs:   Date/Time  Temp  Pulse  Resp  BP  MAP (mmHg)  SpO2  Calculated FIO2 (%) - Nasal Cannula  Nasal Cannula O2 Flow Rate (L/min)  O2 Device  Patient Position - Orthostatic VS   04/16/21 07:11:12  98 2 °F (36 8 °C)  65  16  133/68  90  92 %  28  2 L/min  Nasal cannula  Lying   04/16/21 0550  --  74  16  136/71  --  94 %  28  2 L/min  --  Lying   04/15/21 2230  --  76  20  --  --  95 %  --  --  --  --   04/15/21 22:22:34  97 9 °F (36 6 °C)  --  20  129/63  85  --  --  --  --  --   04/15/21 2000  --  --  --  --  --  --  28  2 L/min  Nasal cannula  --   04/15/21 15:36:07  97 7 °F (36 5 °C)  67  --  111/58  76  93 %  --  --  --  --   04/15/21 09:51:04  99 °F (37 2 °C)  --  16  111/53  72  96 %  28  2 L/min  Nasal cannula  --   04/15/21 0815  --  73  22  167/72  --  95 %  28  2 L/min  Nasal cannula  Lying   04/15/21 0600  --  86  24Abnormal   141/65  94  94 %  28  2 L/min  Nasal cannula  Lying   04/15/21 0550  --  --  --  --  --  --  --  --  Nasal cannula   --   O2 Device: 2L/min nasal cannula 97% at 04/15/21 0550   04/15/21 0548  --  --  --  --  --  86 %Abnormal   --  --  --  --       Pertinent Labs/Diagnostic Test Results:       Results from last 7 days   Lab Units 04/16/21  0458 04/15/21  0601   WBC Thousand/uL 14 44* 14 15*   HEMOGLOBIN g/dL 10 1* 10 6*   HEMATOCRIT % 34 0* 34 9*   NEUTROS ABS Thousands/µL 12 62* 12 21*         Results from last 7 days   Lab Units 04/16/21  0458 04/15/21  0600   SODIUM mmol/L 138 138 POTASSIUM mmol/L 4 2 3 0*   CHLORIDE mmol/L 99* 96*   CO2 mmol/L 34* 31   ANION GAP mmol/L 5 11   BUN mg/dL 20 19   CREATININE mg/dL 0 97 1 11   EGFR ml/min/1 73sq m 77 65   CALCIUM mg/dL 8 6 8 7   MAGNESIUM mg/dL 2 2  --    PHOSPHORUS mg/dL 2 9  --      Results from last 7 days   Lab Units 04/16/21  0458 04/15/21  0600   AST U/L 21 15   ALT U/L 20 22   ALK PHOS U/L 98 124*   TOTAL PROTEIN g/dL 5 9* 6 3*   ALBUMIN g/dL 2 4* 2 8*   TOTAL BILIRUBIN mg/dL 0 90 1 00     Results from last 7 days   Lab Units 04/16/21  0707 04/15/21  2219 04/15/21  1537 04/10/21  1229 04/10/21  0852 04/09/21  2129 04/09/21  1724 04/09/21  1214   POC GLUCOSE mg/dl 162* 209* 318* 169* 91 174* 186* 201*     Results from last 7 days   Lab Units 04/16/21  0458 04/15/21  0600   GLUCOSE RANDOM mg/dL 166* 227*     Results from last 7 days   Lab Units 04/15/21  0600   CK TOTAL U/L 24*     Results from last 7 days   Lab Units 04/15/21  0600   TROPONIN I ng/mL 0 05*     Results from last 7 days   Lab Units 04/16/21  0458 04/15/21  0600   D-DIMER QUANTITATIVE ug/ml FEU 0 92* 1 53*     Results from last 7 days   Lab Units 04/16/21  0458 04/15/21  0600   PROTIME seconds 15 9* 15 3*   INR  1 27* 1 21*   PTT seconds  --  27     Results from last 7 days   Lab Units 04/16/21  0458   TSH 3RD GENERATON uIU/mL 1 523     Results from last 7 days   Lab Units 04/15/21  0600   PROCALCITONIN ng/ml <0 05     Results from last 7 days   Lab Units 04/15/21  0815 04/15/21  0613   LACTIC ACID mmol/L 1 2 3 6*     Results from last 7 days   Lab Units 04/15/21  0600   NT-PRO BNP pg/mL 7,995*     Results from last 7 days   Lab Units 04/16/21  0458   FERRITIN ng/mL 308         Results from last 7 days   Lab Units 04/16/21  0458   LIPASE u/L 131     Results from last 7 days   Lab Units 04/16/21  0458 04/15/21  0600   CRP mg/L 98 6* 62 3*         Results from last 7 days   Lab Units 04/15/21  0620   CLARITY UA  Clear   COLOR UA  Yellow   SPEC GRAV UA  1 010   PH UA  6 5 GLUCOSE UA mg/dl Negative   KETONES UA mg/dl Negative   BLOOD UA  Negative   PROTEIN UA mg/dl 30 (1+)*   NITRITE UA  Negative   BILIRUBIN UA  Negative   UROBILINOGEN UA E U /dl 0 2   LEUKOCYTES UA  Negative   WBC UA /hpf 0-1   RBC UA /hpf None Seen   BACTERIA UA /hpf None Seen   EPITHELIAL CELLS WET PREP /hpf Occasional     Results from last 7 days   Lab Units 04/15/21  0600   BLOOD CULTURE  No Growth at 24 hrs  No Growth at 24 hrs    04-15-21    CXR  Mild progression of bilateral groundglass opacities  In the setting of clinically suspected/proven COVID-19, this plain film appearance while nonspecific, can be seen in cases of viral pneumonia such as COVID-19  CTA ED PE study   No pulmonary embolus  Extensive new bilateral groundglass opacity and septal thickening, likely a combination of Covid-19 pneumonia and edema  Redemonstration of juxtapleural nodules   The previous parenchymal nodules are not well demonstrated due to the new groundglass opacity   Persistent mediastinal lymphadenopathy   These abnormalities should be reevaluated in 3 months, after clearing of   acute infection  Small bilateral pleural effusions, greater on the left   The right effusion has increased slightly from 3/26/2021          EKG   Sinus rhythm with frequent Premature ventricular complexes  Left axis deviation  Non-specific intra-ventricular conduction block  Nonspecific ST and T wave abnormality  Abnormal ECG    ED Treatment:   Medication Administration from 04/15/2021 0533 to 04/15/2021 0908       Date/Time Order Dose Route Action     04/15/2021 0652 levofloxacin (LEVAQUIN) IVPB (premix in dextrose) 750 mg 150 mL 750 mg Intravenous New Bag     04/15/2021 0829 vancomycin (VANCOCIN) 1250 mg in sodium chloride 0 9% 250 mL IVPB 1,250 mg Intravenous New Bag     04/15/2021 0652 multi-electrolyte (ISOLYTE-S PH 7 4) bolus 500 mL 500 mL Intravenous New Bag     04/15/2021 0651 aspirin chewable tablet 324 mg 324 mg Oral Given 04/15/2021 0700 iohexol (OMNIPAQUE) 350 MG/ML injection (SINGLE-DOSE) 85 mL 85 mL Intravenous Given     04/15/2021 0735 potassium chloride (K-DUR,KLOR-CON) CR tablet 40 mEq 40 mEq Oral Given     04/15/2021 0739 dexamethasone oral liquid 6 mg 0 6 mL 6 mg Oral Given        Past Medical History:   Diagnosis Date    Arthritis     Atrial fibrillation (HCC)     Cataract     Colitis     [facility] ([facility] )     Diabetes mellitus type II, non insulin dependent ([place] [address] )     Fatty liver     History of chemotherapy     History of radiation therapy     History of shingles 2018    Hypertension     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[address] )     Pneumonia     Skin cancer      Present on Admission:   Pneumonia due to COVID-19 virus   Gastroesophageal reflux disease with esophagitis   Dyslipidemia   Essential hypertension   Type 2 diabetes mellitus without complication, without long-term current use of insulin ([facility])      Admitting Diagnosis: Shortness of breath [R06 02]  Hypokalemia [E87 6]  Hypoxia [R09 02]  Elevated troponin [R77 8]  Elevated brain natriuretic peptide (BNP) level [R79 89]  Severe sepsis (HCC) [A41 9, R65 20]  Pneumonia due to COVID-19 virus [U07 1, J12 82]  Age/Sex: 77 y o  male  Admission Orders:  Scheduled Medications:  ascorbic acid, 1,000 mg, Oral, Q12H [address]  aspirin, 81 mg, Oral, Daily  atorvastatin, 40 mg, Oral, Daily  cholecalciferol, 2,000 Units, Oral, Daily  dexamethasone, 6 mg, Intravenous, Q24H  doxycycline hyclate, 100 mg, Oral, Q12H DONNIE  enoxaparin, 30 mg, Subcutaneous, Q12H DONNIE  famotidine, 20 mg, Oral, BID  ferrous sulfate, 325 mg, Oral, Daily With Breakfast  furosemide, 20 mg, Oral, BID  insulin lispro, 2-12 Units, Subcutaneous, TID AC  levofloxacin, 750 mg, Intravenous, Q24H  mesalamine, 4 g, Rectal, HS  metoprolol succinate, 100 mg, Oral, Daily  zinc sulfate, 220 mg, Oral, Daily    Followed by  Akanksha Sandoval ON 4/22/2021] multivitamin-minerals, 1 tablet, Oral, Daily  saccharomyces boulardii, 250 mg, Oral, BID      Continuous IV Infusions:     PRN Meds:  acetaminophen, 650 mg, Oral, Q6H PRN  albuterol, 2 puff, Inhalation, Q4H PRN  ondansetron, 4 mg, Intravenous, Q6H PRN        IP CONSULT TO CASE MANAGEMENT   SCD  Encourage self proning  Telemetry  Blood sugar ac and hs  PT/OT        Network Utilization Review Department  ATTENTION: Please call with any questions or concerns to [phone] and carefully listen to the prompts so that you are directed to the right person  All voicemails are confidential   Constanza Stephen all requests for admission clinical reviews, approved or denied determinations and any other requests to dedicated fax number below belonging to the campus where the patient is receiving treatment   List of dedicated fax numbers for the Facilities:  [address] DENIALS (Administrative/Medical Necessity) [phone]   [address] (Maternity/NICU/Pediatrics) [phone]   [address] [place] [address] [facility] Karolina Barakat 1276 (  [facility] Desir [facility]Renee" 103) [address] [place] [address] Rere Carrizales [address] [facility]) [address] [place] [address] [phone]       ]

## 2021-04-16 NOTE — PLAN OF CARE
Problem: Potential for Falls  Goal: Patient will remain free of falls  Description: INTERVENTIONS:  - Assess patient frequently for physical needs  -  Identify cognitive and physical deficits and behaviors that affect risk of falls    -  Morrison fall precautions as indicated by assessment   - Educate patient/family on patient safety including physical limitations  - Instruct patient to call for assistance with activity based on assessment  - Modify environment to reduce risk of injury  - Consider OT/PT consult to assist with strengthening/mobility  Outcome: Progressing

## 2021-04-16 NOTE — ASSESSMENT & PLAN NOTE
Patient takes Lasix, Toprol-XL and Zestril at home    Ctn on Toprol-XL and Lasix  Monitor vitals as per protocol

## 2021-04-16 NOTE — ASSESSMENT & PLAN NOTE
 Mild COVID pathway as below    COVID19- positive on 4/01/2021   Supplemental oxygen with  2 L saturating 94-96%  o Wean as tolerated for oxygen saturation greater than 92%   CXR revealed " Mild progression of bilateral groundglass opacities "  Chest CT scan revealed "No pulmonary embolus  Extensive new bilateral groundglass opacity and septal thickening, likely a combination of Covid-19 pneumonia and edema  Redemonstration of juxtapleural nodules  The previous parenchymal nodules are not well demonstrated due to the new groundglass opacity  Persistent mediastinal lymphadenopathy  These abnormalities should be reevaluated in 3 months, after clearing of acute infection   "    CBC and CMP daily   Cardiac markers: troponin, CK- 24, BNP-7995   Inflammatory markers: CRP-62 3-98 6, D-Dimer-1 53-0 92, and ferritin-308   Vitamin D3 2000 IU daily, vitamin C 1g PO BID, zinc 220mg PO daily    Continue on dexamethasone and Pepcid   Patient recently completed the course of remdesivir   Consideration for convalescent plasma therapy as symptoms having greater than 5 days   OOB TID; ambulation and mobilization strongly encouraged   PT consult and evaluation    Self proning strongly encouraged   Supportive care   Continue on Levaquin and doxycycline  Will trend procalcitonin    If negative x2 will discontinue antibiotics

## 2021-04-16 NOTE — PROGRESS NOTES
[place]  Progress Note - Navjot Bajwa 1945, 77 y o  male MRN: [id number]  Unit/Bed#: -01 Encounter: [id number]  Primary Care Provider: Jono Reddy DO   Date and time admitted to hospital: 4/15/2021  5:43 AM    Hypokalemia  Assessment & Plan  Resolved  Monitor and replace electrolytes as needed    Severe sepsis [facility])  Assessment & Plan  Patient presented with fever, tachycardia, tachypnea, leukocytosis and lactate of 3 6  Admitted with severe sepsis  Trend lactate q 2 hours  Received IV fluid in ER  Continue on antibiotics  Trend procalcitonin  Follow-up culture results  See above    Gastroesophageal reflux disease with esophagitis  Assessment & Plan  On Pepcid    Acute respiratory failure with hypoxia [facility])  Assessment & Plan  Patient is requiring 2 L of supplemental oxygen  Likely secondary to COVID-19 pneumonia  Wean oxygen as tolerated  Continue on IV antibiotic  See above    Essential hypertension  Assessment & Plan  Patient takes Lasix, Toprol-XL and Zestril at home  Ctn on Toprol-XL and Lasix  Monitor vitals as per protocol    Lymphoma [facility])  Assessment & Plan  History of small cell lymphocytic lymphoma  Diagnosed with lymphoma in 1996 and was treated at [facility]  In remission  Follows up with the oncologist at [facility]  Patient is scheduled to have PET scan on March 30th as outpatient    Dyslipidemia  Assessment & Plan  On statin    Type 2 diabetes mellitus without complication, without long-term current use of insulin [facility])  Assessment & Plan  Lab Results   Component Value Date    HGBA1C 5 8 (H) 12/30/2020       Recent Labs     04/15/21  1537 04/15/21  2219 04/16/21  0707   POCGLU 318* 209* 162*       Blood Sugar Average: Last 72 hrs:  (P) 570 [id number] hold metformin  Accu-Chek a c  HS with Humalog sliding scale      * Pneumonia due to COVID-19 virus  Assessment & Plan   Mild COVID pathway as below    COVID19- positive on 4/01/2021   Supplemental oxygen with  2 L saturating 94-96%  o Wean as tolerated for oxygen saturation greater than 92%   CXR revealed " Mild progression of bilateral groundglass opacities "  Chest CT scan revealed "No pulmonary embolus  Extensive new bilateral groundglass opacity and septal thickening, likely a combination of Covid-19 pneumonia and edema  Redemonstration of juxtapleural nodules  The previous parenchymal nodules are not well demonstrated due to the new groundglass opacity  Persistent mediastinal lymphadenopathy  These abnormalities should be reevaluated in 3 months, after clearing of acute infection   "    CBC and CMP daily   Cardiac markers: troponin, CK- 24, BNP-7995   Inflammatory markers: CRP-62 3-98 6, D-Dimer-1 53-0 92, and ferritin-308   Vitamin D3 2000 IU daily, vitamin C 1g PO BID, zinc 220mg PO daily    Continue on dexamethasone and Pepcid   Patient recently completed the course of remdesivir   Consideration for convalescent plasma therapy as symptoms having greater than 5 days   OOB TID; ambulation and mobilization strongly encouraged   PT consult and evaluation    Self proning strongly encouraged   Supportive care   Continue on Levaquin and doxycycline  Will trend procalcitonin  If negative x2 will discontinue antibiotics      Labs & Imaging: I have personally reviewed pertinent reports  VTE Pharmacologic Prophylaxis: Enoxaparin (Lovenox)  VTE Mechanical Prophylaxis: sequential compression device    Code Status:   Level 1 - Full Code    Patient Centered Rounds: I have performed bedside rounds with nursing staff today  Discussions with Specialists or Other Care Team Provider: JAIMIE    Education and Discussions with Family / Patient:  Wife Shanda Tapia    Current Length of Stay: 1 day(s)    Current Patient Status: Inpatient   Certification Statement: The patient will continue to require additional inpatient hospital stay due to see my assessment and plan       Subjective: Patient is seen and examined at bedside  States he feels better  Still has some cough and shortness of breath  No other complaints  Afebrile  All other ROS are negative  Objective:    Vitals: Blood pressure 133/68, pulse 65, temperature 98 2 °F (36 8 °C), temperature source Oral, resp  rate 16, height 5' 11" (1 803 m), weight 72 3 kg (159 lb 8 oz), SpO2 92 %  ,Body mass index is 22 25 kg/m²  SPO2 RA Rest      ED to Hosp-Admission (Current) from 4/15/2021 in [address] Med [facility] [address]   SpO2  92 %   SpO2 Activity  At Rest   O2 Device  Nasal cannula   O2 Flow Rate  --        I&O:     Intake/Output Summary (Last 24 hours) at 4/16/2021 0929  Last data filed at 4/16/2021 7123  Gross per 24 hour   Intake --   Output 1600 ml   Net -1600 ml       Physical Exam:    General- Alert, lying comfortably in bed  Not in any acute distress  Neck- Supple, No JVD  CVS- regular, S1 and S2 normal   Chest- Bilateral Air entry, decreased at bases  Abdomen- soft, nontender, not distended, no guarding or rigidity, BS+  Extremities-  No pedal edema, No calf tenderness  Moving all 4 extremities  CNS-   Alert, awake and orientedx3  No focal deficits present      Invasive Devices     Peripheral Intravenous Line            Peripheral IV 04/15/21 Right Antecubital 1 day    Peripheral IV 04/15/21 Right Forearm 1 day                      Social History  reviewed  Family History   Problem Relation Age of Onset    Heart block Family     Coronary artery disease Mother     Thrombosis Mother     Hypertension Mother    Carlyle Nicko Arthritis Mother     Hyperlipidemia Mother     Diabetes Father     Hypertension Father     Arthritis Father     Sudden death Father         cardiac    Colon cancer Paternal Grandfather     Breast cancer Sister     Heart disease Brother         Coronary stents    reviewed    Meds:  Current Facility-Administered Medications   Medication Dose Route Frequency Provider Last Rate Last Admin    acetaminophen (TYLENOL) tablet 650 mg  650 mg Oral Q6H PRN Zurdo Veliz MD        albuterol (PROVENTIL HFA,VENTOLIN HFA) inhaler 2 puff  2 puff Inhalation Q4H PRN Zurdo Veliz MD        ascorbic acid (VITAMIN C) tablet 1,000 mg  1,000 mg Oral Q12H Yi Alvarez MD   1,000 mg at 04/16/21 0803    aspirin (ECOTRIN LOW STRENGTH) EC tablet 81 mg  81 mg Oral Daily Zurdo Veliz MD   81 mg at 04/16/21 0804    atorvastatin (LIPITOR) tablet 40 mg  40 mg Oral Daily Zurdo Veliz MD   40 mg at 04/16/21 0803    cholecalciferol (VITAMIN D3) tablet 2,000 Units  2,000 Units Oral Daily Zurdo Veliz MD   2,000 Units at 04/16/21 0804    dexamethasone (DECADRON) injection 6 mg  6 mg Intravenous Q24H Zurdo Veliz MD   6 mg at 04/16/21 0804    doxycycline hyclate (VIBRAMYCIN) capsule 100 mg  100 mg Oral Q12H Yi Alvarez MD   100 mg at 04/16/21 0802    enoxaparin (LOVENOX) subcutaneous injection 30 mg  30 mg Subcutaneous Q12H Yi Alvarez MD   30 mg at 04/16/21 0802    famotidine (PEPCID) tablet 20 mg  20 mg Oral BID Zurdo Veliz MD   20 mg at 04/16/21 0805    ferrous sulfate tablet 325 mg  325 mg Oral Daily With Breakfast Zurdo Veliz MD   325 mg at 04/16/21 0802    furosemide (LASIX) tablet 20 mg  20 mg Oral BID Zurdo Veliz MD   20 mg at 04/16/21 0803    insulin lispro (HumaLOG) 100 units/mL subcutaneous injection 2-12 Units  2-12 Units Subcutaneous TID [facility] Zurdo Veliz MD   2 Units at 04/16/21 0707    levofloxacin (LEVAQUIN) IVPB (premix in dextrose) 750 mg 150 mL  750 mg Intravenous Q24H Zurdo Veliz  mL/hr at 04/16/21 0554 750 mg at 04/16/21 0554    mesalamine (ROWASA) enema 4 g  4 g Rectal HS Zurdo Veliz MD        metoprolol succinate (TOPROL-XL) 24 hr tablet 100 mg  100 mg Oral Daily Zurdo Veliz MD   100 mg at 04/16/21 0803    zinc sulfate (ZINCATE) capsule 220 mg  220 mg Oral Daily Zurdo Veliz MD   220 mg at 04/16/21 0802    Followed by   João Perez ON 4/22/2021] multivitamin-minerals (CENTRUM ADULTS) tablet 1 tablet  1 tablet Oral Daily Gentry Christiansen MD        ondansetron [facility]) injection 4 mg  4 mg Intravenous Q6H PRN Gentry Christiansen MD        saccharomyces boulardii (FLORASTOR) capsule 250 mg  250 mg Oral BID Gentry Christiansen MD   250 mg at 04/16/21 0804      Medications Prior to Admission   Medication    aspirin 81 MG tablet    atorvastatin (LIPITOR) 40 mg tablet    cholecalciferol (VITAMIN D3) 1,000 units tablet    diphenoxylate-atropine (LOMOTIL) 2 5-0 025 mg per tablet    ferrous sulfate 324 MG TBEC    furosemide (LASIX) 20 mg tablet    lisinopril (ZESTRIL) 20 mg tablet    mesalamine (ROWASA) 4 g    metFORMIN (GLUCOPHAGE-XR) 500 mg 24 hr tablet    metoprolol succinate (TOPROL-XL) 100 mg 24 hr tablet    multivitamin-minerals (CENTRUM ADULTS) tablet    nitroglycerin (NITROSTAT) 0 3 mg SL tablet    omeprazole (PriLOSEC) 40 MG capsule    predniSONE 10 mg tablet    saccharomyces boulardii (FLORASTOR) 250 mg capsule       Labs:  Results from last 7 days   Lab Units 04/16/21  0458 04/15/21  0601   WBC Thousand/uL 14 44* 14 15*   HEMOGLOBIN g/dL 10 1* 10 6*   HEMATOCRIT % 34 0* 34 9*   NEUTROS PCT % 87* 86*   LYMPHS PCT % 4* 4*   MONOS PCT % 8 8   EOS PCT % 0 1     Results from last 7 days   Lab Units 04/16/21  0458 04/15/21  0600   POTASSIUM mmol/L 4 2 3 0*   CHLORIDE mmol/L 99* 96*   CO2 mmol/L 34* 31   BUN mg/dL 20 19   CREATININE mg/dL 0 97 1 11   CALCIUM mg/dL 8 6 8 7   ALK PHOS U/L 98 124*   ALT U/L 20 22   AST U/L 21 15     Lab Results   Component Value Date    TROPONINI 0 05 (H) 04/15/2021    TROPONINI 0 04 04/06/2021    TROPONINI 0 05 (H) 04/06/2021    CKMB 1 7 04/07/2021    CKMB 1 9 04/06/2021    CKTOTAL 24 (L) 04/15/2021    CKTOTAL 193 04/07/2021    CKTOTAL 341 (H) 04/06/2021     Results from last 7 days   Lab Units 04/16/21  0458 04/15/21  0600   INR  1 27* 1 21*     Lab Results Component Value Date    BLOODCX Received in Microbiology Lab  Culture in Progress  04/15/2021    BLOODCX Received in Microbiology Lab  Culture in Progress  04/15/2021    BLOODCX No Growth After 5 Days  04/06/2021    BLOODCX No Growth After 5 Days  04/06/2021         Imaging:  Results for orders placed during the hospital encounter of 04/15/21   XR chest portable    Narrative CHEST     INDICATION:   Acute Resp Failure  Patient has confirmed COVID-19  COMPARISON:  April 6, 2021    EXAM PERFORMED/VIEWS:  XR CHEST PORTABLE      FINDINGS:  There are median sternotomy wires indicating prior cardiac surgery  Cardiomediastinal silhouette appears unremarkable  Bilateral groundglass opacities which have progressed mildly from the prior study  No pleural effusion or pneumothorax  Osseous structures appear within normal limits for patient age  Impression Mild progression of bilateral groundglass opacities  In the setting of clinically suspected/proven COVID-19, this plain film appearance while nonspecific, can be seen in cases of viral pneumonia such as COVID-19  Workstation performed: [id number]       Results for orders placed during the hospital encounter of 09/11/19   XR chest 2 views    Narrative CHEST     INDICATION:   Chest Pain  COMPARISON:  8/20/2019  EXAM PERFORMED/VIEWS:  XR CHEST PA & LATERAL  The frontal view was performed utilizing dual energy radiographic technique  Images: 4    FINDINGS:  There are median sternotomy wires indicating prior cardiac surgery  Cardiomediastinal silhouette appears unremarkable  The lungs are clear  No pneumothorax or pleural effusion  Osseous structures appear within normal limits for patient age  Impression No acute cardiopulmonary disease          Workstation performed: [id number]         Last 24 Hours Medication List:   Current Facility-Administered Medications   Medication Dose Route Frequency Provider Last Rate    acetaminophen  650 mg Oral Q6H PRN Christel Mcdaniels MD      albuterol  2 puff Inhalation Q4H PRN Christel Mcdaniels MD      ascorbic acid  1,000 mg Oral Q12H Jose Manuel Day MD      aspirin  81 mg Oral Daily Christel Mcdaniels MD      atorvastatin  40 mg Oral Daily Christel Mcdaniels MD      cholecalciferol  2,000 Units Oral Daily Christel Mcdaniels MD      dexamethasone  6 mg Intravenous Q24H Christel Mcdaniels MD      doxycycline hyclate  100 mg Oral Q12H Jose Manuel Day MD      enoxaparin  30 mg Subcutaneous Q12H Jose Manuel Day MD      famotidine  20 mg Oral BID Christel Mcdaniels MD      ferrous sulfate  325 mg Oral Daily With Breakfast Christel Mcdaniels MD      furosemide  20 mg Oral BID Christel Mcdaniels MD      insulin lispro  2-12 Units Subcutaneous TID AC Christel Mcdaniels MD      levofloxacin  750 mg Intravenous Q24H Christel Mcdaniels  mg (04/16/21 0554)    mesalamine  4 g Rectal HS Christel Mcdaniels MD      metoprolol succinate  100 mg Oral Daily Christel Mcdaniels MD      zinc sulfate  220 mg Oral Daily Christel Mcdaniels MD      Followed by   Jean Stuart ON 4/22/2021] multivitamin-minerals  1 tablet Oral Daily Christel Mcdaniels MD      ondansetron  4 mg Intravenous Q6H PRN Christel Mcdaniels MD      saccharomyces boulardii  250 mg Oral BID Christel Mcdaniels MD          Today, Patient Was Seen By: Christel Mcdaniels MD    ** Please Note: Dictation voice to text software may have been used in the creation of this document   **

## 2021-04-16 NOTE — QUICK NOTE
Pt with 7 beat run of Vtach, asymptomatic  Morning labs, pending - will replete K if needed  Mg sulfate 2 grams ordered

## 2021-04-16 NOTE — ASSESSMENT & PLAN NOTE
Lab Results   Component Value Date    HGBA1C 5 8 (H) 12/30/2020       Recent Labs     04/15/21  1537 04/15/21  2219 04/16/21  0707   POCGLU 318* 209* 162*       Blood Sugar Average: Last 72 hrs:  (P) 710 [id number] hold metformin  Accu-Chek a c  HS with Humalog sliding scale

## 2021-04-16 NOTE — ASSESSMENT & PLAN NOTE
History of small cell lymphocytic lymphoma  Diagnosed with lymphoma in 1996 and was treated at [facility]    In remission  Follows up with the oncologist at [facility]  Patient is scheduled to have PET scan on March 30th as outpatient

## 2021-04-16 NOTE — RESPIRATORY THERAPY NOTE
RT Protocol Note  Cierra Loveless 77 y o  male MRN: [id number]  Unit/Bed#: -01 Encounter: [id number]    Assessment    Principal Problem:    Pneumonia due to COVID-19 virus  Active Problems:    Type 2 diabetes mellitus without complication, without long-term current use of insulin (HCC)    Dyslipidemia    Essential hypertension    Acute respiratory failure with hypoxia (HCC)    Gastroesophageal reflux disease with esophagitis    Severe sepsis (HCC)    Hypokalemia      Home Pulmonary Medications:None         Past Medical History:   Diagnosis Date    Arthritis     Atrial fibrillation ([place] [address] )     Cataract     Colitis     Colon cancer ([place] [address] )     Diabetes mellitus type II, non insulin dependent ([place] [address] )     Fatty liver     History of chemotherapy     History of radiation therapy     History of shingles 2018    Hypertension     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[address] )     Pneumonia     Skin cancer      Social History     Socioeconomic History    Marital status: /Civil Union     Spouse name: None    Number of children: None    Years of education: None    Highest education level: None   Occupational History    Occupation: WORKING FULLTIME    Social Needs    Financial resource strain: None    Food insecurity     Worry: None     Inability: None    Transportation needs     Medical: None     Non-medical: None   Tobacco Use    Smoking status: Former Smoker     Packs/day: 1 00     Years: 40 00     Pack years: 40 00     Types: Cigarettes     Quit date:      Years since quittin 2    Smokeless tobacco: Never Used   Substance and Sexual Activity    Alcohol use: Yes     Frequency: Monthly or less     Drinks per session: 1 or 2     Binge frequency: Never     Comment: very rare "beer here and there"    Drug use: No    Sexual activity: None   Lifestyle    Physical activity     Days per week: None     Minutes per session: None    Stress: None   Relationships    Social connections     Talks on phone: None     Gets together: None     Attends Evangelical service: None     Active member of club or organization: None     Attends meetings of clubs or organizations: None     Relationship status: None    Intimate partner violence     Fear of current or ex partner: None     Emotionally abused: None     Physically abused: None     Forced sexual activity: None   Other Topics Concern    None   Social History Narrative    None       Subjective         Objective    Physical Exam:   Assessment Type: (P) Assess only  General Appearance: (P) Awake  Respiratory Pattern: (P) Dyspnea with exertion  Chest Assessment: (P) Chest expansion symmetrical, Trachea midline  Bilateral Breath Sounds: (P) Diminished  Cough: (P) Non-productive, Dry    Vitals:  Blood pressure 129/63, pulse (P) 76, temperature 97 9 °F (36 6 °C), resp  rate (P) 20, height 5' 11" (1 803 m), weight 76 8 kg (169 lb 5 oz), SpO2 (P) 95 %  Imaging and other studies: I have personally reviewed pertinent reports  Plan    Respiratory Plan: (P) Mild Distress pathway  Will order Albuterol mdi prn

## 2021-04-16 NOTE — CASE MANAGEMENT
LOS: 1 day  Bundle:No   Unplanned Readmission Score: 27 Green  30 Day Readmission: Yes  Patient was in [address] 4/6/21-4/10/21 with COVID  Roya Beatty Readmitted 4/15/21 with Pneumonia due to COVID-10 virus and acute resp failure with hypoxia  Spoke with patient via cell phone [phone]  Spoke with wife Dmitri Ochoa at [phone]  Explained the role of CM  Obtained the following information from patient  Home: 55Year old community 1 step to enter has 2 stories patient on first floor set up  Lives With: wife and KIA  ADL's: Independent wife assists as needed  DME: None  Ambulation: Self  Transportation: Wife  Ph[facility]: [facility]  PCP: DO Dorinda Lopez 79 Hx: MARCELA  Rehab Hx: No  Mental Health Hx: No  Substance Abuse Hx: No  Employment:Retired  POA/LW/AD: No/Yes/NO  Transport at D/C: Wife Dmitri Ochoa    CM reviewed d/c planning process including the following: identifying help at home, patient preferences for d/c planning needs, availability of treatment team to discuss questions or concerns patient and/or family may have regarding understanding medications and recognizing signs and symptoms once discharged  Discussed home health care at discharge pt choice is [facility] has used them in past  [facility] [address] [facility]

## 2021-04-17 ENCOUNTER — APPOINTMENT (INPATIENT)
Dept: NON INVASIVE DIAGNOSTICS | Facility: HOSPITAL | Age: 76
DRG: 871 | End: 2021-04-17
Payer: COMMERCIAL

## 2021-04-17 LAB
ANION GAP SERPL CALCULATED.3IONS-SCNC: 7 MMOL/L (ref 4–13)
BASOPHILS # BLD AUTO: 0.02 THOUSANDS/ΜL (ref 0–0.1)
BASOPHILS NFR BLD AUTO: 0 % (ref 0–1)
BUN SERPL-MCNC: 18 MG/DL (ref 5–25)
CALCIUM SERPL-MCNC: 8.7 MG/DL (ref 8.3–10.1)
CHLORIDE SERPL-SCNC: 98 MMOL/L (ref 100–108)
CO2 SERPL-SCNC: 30 MMOL/L (ref 21–32)
CREAT SERPL-MCNC: 0.98 MG/DL (ref 0.6–1.3)
CRP SERPL QL: 83.3 MG/L
D DIMER PPP FEU-MCNC: 2.05 UG/ML FEU
EOSINOPHIL # BLD AUTO: 0 THOUSAND/ΜL (ref 0–0.61)
EOSINOPHIL NFR BLD AUTO: 0 % (ref 0–6)
ERYTHROCYTE [DISTWIDTH] IN BLOOD BY AUTOMATED COUNT: 19.8 % (ref 11.6–15.1)
GFR SERPL CREATININE-BSD FRML MDRD: 76 ML/MIN/1.73SQ M
GLUCOSE SERPL-MCNC: 103 MG/DL (ref 65–140)
GLUCOSE SERPL-MCNC: 107 MG/DL (ref 65–140)
GLUCOSE SERPL-MCNC: 236 MG/DL (ref 65–140)
GLUCOSE SERPL-MCNC: 261 MG/DL (ref 65–140)
GLUCOSE SERPL-MCNC: 289 MG/DL (ref 65–140)
HCT VFR BLD AUTO: 32.6 % (ref 36.5–49.3)
HGB BLD-MCNC: 9.9 G/DL (ref 12–17)
IMM GRANULOCYTES # BLD AUTO: 0.12 THOUSAND/UL (ref 0–0.2)
IMM GRANULOCYTES NFR BLD AUTO: 1 % (ref 0–2)
LYMPHOCYTES # BLD AUTO: 0.99 THOUSANDS/ΜL (ref 0.6–4.47)
LYMPHOCYTES NFR BLD AUTO: 6 % (ref 14–44)
MCH RBC QN AUTO: 24.3 PG (ref 26.8–34.3)
MCHC RBC AUTO-ENTMCNC: 30.4 G/DL (ref 31.4–37.4)
MCV RBC AUTO: 80 FL (ref 82–98)
MONOCYTES # BLD AUTO: 1.12 THOUSAND/ΜL (ref 0.17–1.22)
MONOCYTES NFR BLD AUTO: 7 % (ref 4–12)
NEUTROPHILS # BLD AUTO: 14.21 THOUSANDS/ΜL (ref 1.85–7.62)
NEUTS SEG NFR BLD AUTO: 86 % (ref 43–75)
NRBC BLD AUTO-RTO: 0 /100 WBCS
POTASSIUM SERPL-SCNC: 3.9 MMOL/L (ref 3.5–5.3)
RBC # BLD AUTO: 4.08 MILLION/UL (ref 3.88–5.62)
SODIUM SERPL-SCNC: 135 MMOL/L (ref 136–145)
WBC # BLD AUTO: 16.46 THOUSAND/UL (ref 4.31–10.16)

## 2021-04-17 PROCEDURE — 85379 FIBRIN DEGRADATION QUANT: CPT | Performed by: INTERNAL MEDICINE

## 2021-04-17 PROCEDURE — 99232 SBSQ HOSP IP/OBS MODERATE 35: CPT | Performed by: INTERNAL MEDICINE

## 2021-04-17 PROCEDURE — 86140 C-REACTIVE PROTEIN: CPT | Performed by: INTERNAL MEDICINE

## 2021-04-17 PROCEDURE — 93970 EXTREMITY STUDY: CPT

## 2021-04-17 PROCEDURE — 85025 COMPLETE CBC W/AUTO DIFF WBC: CPT | Performed by: INTERNAL MEDICINE

## 2021-04-17 PROCEDURE — 99223 1ST HOSP IP/OBS HIGH 75: CPT | Performed by: INTERNAL MEDICINE

## 2021-04-17 PROCEDURE — 93970 EXTREMITY STUDY: CPT | Performed by: INTERNAL MEDICINE

## 2021-04-17 PROCEDURE — 80048 BASIC METABOLIC PNL TOTAL CA: CPT | Performed by: INTERNAL MEDICINE

## 2021-04-17 PROCEDURE — 82948 REAGENT STRIP/BLOOD GLUCOSE: CPT

## 2021-04-17 RX ADMIN — LEVOFLOXACIN 750 MG: 5 INJECTION, SOLUTION INTRAVENOUS at 06:00

## 2021-04-17 RX ADMIN — OXYCODONE HYDROCHLORIDE AND ACETAMINOPHEN 1000 MG: 500 TABLET ORAL at 08:11

## 2021-04-17 RX ADMIN — ATORVASTATIN CALCIUM 40 MG: 40 TABLET, FILM COATED ORAL at 08:11

## 2021-04-17 RX ADMIN — FERROUS SULFATE TAB 325 MG (65 MG ELEMENTAL FE) 325 MG: 325 (65 FE) TAB at 08:10

## 2021-04-17 RX ADMIN — INSULIN LISPRO 6 UNITS: 100 INJECTION, SOLUTION INTRAVENOUS; SUBCUTANEOUS at 18:02

## 2021-04-17 RX ADMIN — FAMOTIDINE 20 MG: 20 TABLET ORAL at 18:03

## 2021-04-17 RX ADMIN — METOPROLOL SUCCINATE 100 MG: 50 TABLET, EXTENDED RELEASE ORAL at 08:11

## 2021-04-17 RX ADMIN — Medication 250 MG: at 18:03

## 2021-04-17 RX ADMIN — Medication 2000 UNITS: at 08:11

## 2021-04-17 RX ADMIN — FAMOTIDINE 20 MG: 20 TABLET ORAL at 08:11

## 2021-04-17 RX ADMIN — Medication 250 MG: at 08:10

## 2021-04-17 RX ADMIN — ASPIRIN 81 MG: 81 TABLET, COATED ORAL at 08:10

## 2021-04-17 RX ADMIN — OXYCODONE HYDROCHLORIDE AND ACETAMINOPHEN 1000 MG: 500 TABLET ORAL at 22:26

## 2021-04-17 RX ADMIN — APIXABAN 2.5 MG: 2.5 TABLET, FILM COATED ORAL at 18:03

## 2021-04-17 RX ADMIN — APIXABAN 2.5 MG: 2.5 TABLET, FILM COATED ORAL at 08:11

## 2021-04-17 RX ADMIN — INSULIN LISPRO 6 UNITS: 100 INJECTION, SOLUTION INTRAVENOUS; SUBCUTANEOUS at 12:00

## 2021-04-17 RX ADMIN — ZINC SULFATE 220 MG (50 MG) CAPSULE 220 MG: CAPSULE at 08:11

## 2021-04-17 RX ADMIN — DEXAMETHASONE SODIUM PHOSPHATE 6 MG: 4 INJECTION, SOLUTION INTRA-ARTICULAR; INTRALESIONAL; INTRAMUSCULAR; INTRAVENOUS; SOFT TISSUE at 08:11

## 2021-04-17 NOTE — ASSESSMENT & PLAN NOTE
Patient presented with fever, tachycardia, tachypnea, leukocytosis and lactate of 3 6  Admitted with severe sepsis secondary to underlying COVID pneumonia  Received IV fluid in ER  DC antibiotics as procalcitonin is normal  Follow-up culture results  See above

## 2021-04-17 NOTE — ASSESSMENT & PLAN NOTE
Lab Results   Component Value Date    HGBA1C 5 8 (H) 12/30/2020       Recent Labs     04/16/21  0707 04/16/21  1108 04/16/21  1736 04/16/21  2115   POCGLU 162* 245* 302* 233*       Blood Sugar Average: Last 72 hrs:  (P) 399 [id number] hold metformin  Accu-Chek a c  HS with Humalog sliding scale

## 2021-04-17 NOTE — PROGRESS NOTES
[place]  Progress Note - Mathew Decker 1945, 77 y o  male MRN: [id number]  Unit/Bed#: -01 Encounter: [id number]  Primary Care Provider: Nakul Fisher DO   Date and time admitted to hospital: 4/15/2021  5:43 AM    Hypokalemia  Assessment & Plan  Resolved  Monitor and replace electrolytes as needed    Severe sepsis [facility])  Assessment & Plan  Patient presented with fever, tachycardia, tachypnea, leukocytosis and lactate of 3 6  Admitted with severe sepsis secondary to underlying COVID pneumonia  Received IV fluid in ER  DC antibiotics as procalcitonin is normal  Follow-up culture results  See above    Gastroesophageal reflux disease with esophagitis  Assessment & Plan  On Pepcid    Acute respiratory failure with hypoxia [facility])  Assessment & Plan  Patient is requiring 2 L of supplemental oxygen in ER  This morning patient is on 4 L of supplemental oxygen  Likely secondary to COVID-19 pneumonia  Wean oxygen as tolerated  Procalcitonin x2 is normal   Discontinue antibiotics  See above    Essential hypertension  Assessment & Plan  Patient takes Lasix, Toprol-XL and Zestril at home  Ctn on Toprol-XL  Hold Lasix  Monitor vitals as per protocol    Lymphoma [facility])  Assessment & Plan  History of small cell lymphocytic lymphoma  Diagnosed with lymphoma in 1996 and was treated at [facility]    In remission  Follows up with the oncologist at [facility]  Patient is scheduled to have PET scan on April 30th as outpatient    Dyslipidemia  Assessment & Plan  On statin    Type 2 diabetes mellitus without complication, without long-term current use of insulin [facility])  Assessment & Plan  Lab Results   Component Value Date    HGBA1C 5 8 (H) 12/30/2020       Recent Labs     04/16/21  0707 04/16/21  1108 04/16/21  1736 04/16/21  2115   POCGLU 162* 245* 302* 233*       Blood Sugar Average: Last 72 hrs:  (P) 858 [id number] hold metformin  Accu-Chek a c  HS with Humalog sliding scale     * Pneumonia due to COVID-19 virus  Assessment & Plan   Mild COVID pathway as below    COVID19- positive on 4/01/2021   Supplemental oxygen with  2 L saturating 94-96% on admission   Patient is requiring 4 L of supplemental oxygen this morning  Wean as tolerated   CXR revealed " Mild progression of bilateral groundglass opacities "  Chest CT scan revealed "No pulmonary embolus  Extensive new bilateral groundglass opacity and septal thickening, likely a combination of Covid-19 pneumonia and edema  Redemonstration of juxtapleural nodules  The previous parenchymal nodules are not well demonstrated due to the new groundglass opacity  Persistent mediastinal lymphadenopathy  These abnormalities should be reevaluated in 3 months, after clearing of acute infection   "    CBC and CMP daily   Cardiac markers: troponin, CK- 24, BNP-7995   Inflammatory markers: CRP-62 3-98 6-83 3, D-Dimer-1 53-0 92-2 05, and ferritin-308   Vitamin D3 2000 IU daily, vitamin C 1g PO BID, zinc 220mg PO daily    Continue on dexamethasone and Pepcid   Patient recently completed the course of remdesivir   Consideration for convalescent plasma therapy as symptoms having greater than 5 days   OOB TID; ambulation and mobilization strongly encouraged   PT consult and evaluation    Self proning strongly encouraged   Supportive care   Was started on Levaquin and doxycycline  Procalcitonin is negative x2  Will discontinue antibiotics   CT PE study was negative for any pulmonary embolism  D-dimer this morning is 2 05  Will switch patient to Eliquis and order venous duplex of bilateral lower extremity      Labs & Imaging: I have personally reviewed pertinent reports  VTE Pharmacologic Prophylaxis:  Eliquis  VTE Mechanical Prophylaxis: sequential compression device    Code Status:   Level 1 - Full Code    Patient Centered Rounds: I have performed bedside rounds with nursing staff today      Discussions with Specialists or Other Care Team Provider: JAIMIE    Education and Discussions with Family / Patient:  Wife Haley Alamo    Current Length of Stay: 2 day(s)    Current Patient Status: Inpatient   Certification Statement: The patient will continue to require additional inpatient hospital stay due to see my assessment and plan  Subjective:   Patient is seen and examined at bedside  Some cough and shortness of breath  No new complaints  All other ROS are negative  Objective:    Vitals: Blood pressure 134/68, pulse 69, temperature 100 3 °F (37 9 °C), resp  rate 18, height 5' 11" (1 803 m), weight 73 2 kg (161 lb 6 oz), SpO2 90 %  ,Body mass index is 22 51 kg/m²  SPO2 RA Rest      ED to Hosp-Admission (Current) from 4/15/2021 in [address] Med [address]   SpO2  90 %   SpO2 Activity  At Rest   O2 Device  Nasal cannula   O2 Flow Rate  --        I&O:     Intake/Output Summary (Last 24 hours) at 4/17/2021 0716  Last data filed at 4/17/2021 0401  Gross per 24 hour   Intake --   Output 650 ml   Net -650 ml       Physical Exam:    General- Alert, lying comfortably in bed  Not in any acute distress  Neck- Supple, No JVD  CVS- regular, S1 and S2 normal  Chest- Bilateral Air entry, decreased at bases  Abdomen- soft, nontender, not distended, no guarding or rigidity, BS+  Extremities-  No pedal edema, No calf tenderness  CNS-   Alert, awake and orientedx3  No focal deficits present      Invasive Devices     Peripheral Intravenous Line            Peripheral IV 04/15/21 Right Antecubital 2 days    Peripheral IV 04/15/21 Right Forearm 2 days                      Social History  reviewed  Family History   Problem Relation Age of Onset    Heart block Family     Coronary artery disease Mother     Thrombosis Mother     Hypertension Mother    Arabella Nine Arthritis Mother     Hyperlipidemia Mother     Diabetes Father     Hypertension Father     Arthritis Father     Sudden death Father         cardiac    Colon cancer Paternal Grandfather  Breast cancer Sister     Heart disease Brother         Coronary stents    reviewed    Meds:  Current Facility-Administered Medications   Medication Dose Route Frequency Provider Last Rate Last Admin    acetaminophen (TYLENOL) tablet 650 mg  650 mg Oral Q6H PRN Loren Hardin MD        albuterol (PROVENTIL HFA,VENTOLIN HFA) inhaler 2 puff  2 puff Inhalation Q4H PRN Loren Hardin MD        apixaban (ELIQUIS) tablet 2 5 mg  2 5 mg Oral BID Loren Hardin MD        ascorbic acid (VITAMIN C) tablet 1,000 mg  1,000 mg Oral Q12H Anastasia Rick MD   1,000 mg at 04/16/21 2126    aspirin (ECOTRIN LOW STRENGTH) EC tablet 81 mg  81 mg Oral Daily Loren Hardin MD   81 mg at 04/16/21 0804    atorvastatin (LIPITOR) tablet 40 mg  40 mg Oral Daily Loern Hardin MD   40 mg at 04/16/21 0803    cholecalciferol (VITAMIN D3) tablet 2,000 Units  2,000 Units Oral Daily Loren Hardin MD   2,000 Units at 04/16/21 0804    dexamethasone (DECADRON) injection 6 mg  6 mg Intravenous Q24H Loren Hardin MD   6 mg at 04/16/21 0804    famotidine (PEPCID) tablet 20 mg  20 mg Oral BID Loren Hardin MD   20 mg at 04/16/21 1747    ferrous sulfate tablet 325 mg  325 mg Oral Daily With Breakfast Loren Hardin MD   325 mg at 04/16/21 0802    insulin lispro (HumaLOG) 100 units/mL subcutaneous injection 2-12 Units  2-12 Units Subcutaneous TID [facility] Loren Hardin MD   8 Units at 04/16/21 1748    mesalamine (ROWASA) enema 4 g  4 g Rectal HS Loren Hardin MD        metoprolol succinate (TOPROL-XL) 24 hr tablet 100 mg  100 mg Oral Daily Loren Hardin MD   100 mg at 04/16/21 8087    zinc sulfate (ZINCATE) capsule 220 mg  220 mg Oral Daily Loren Hardin MD   220 mg at 04/16/21 0802    Followed by   Leah Zavaleta ON 4/22/2021] multivitamin-minerals (CENTRUM ADULTS) tablet 1 tablet  1 tablet Oral Daily Loren Hardin MD        ondansetron (ZOFRAN) injection 4 mg  4 mg Intravenous Q6H PRN Rishabh Janet Rowley MD        saccharomyces boulardii (FLORASTOR) capsule 250 mg  250 mg Oral BID Chris Andrade MD   250 mg at 04/16/21 1747      Medications Prior to Admission   Medication    aspirin 81 MG tablet    atorvastatin (LIPITOR) 40 mg tablet    cholecalciferol (VITAMIN D3) 1,000 units tablet    diphenoxylate-atropine (LOMOTIL) 2 5-0 025 mg per tablet    ferrous sulfate 324 MG TBEC    furosemide (LASIX) 20 mg tablet    lisinopril (ZESTRIL) 20 mg tablet    mesalamine (ROWASA) 4 g    metFORMIN (GLUCOPHAGE-XR) 500 mg 24 hr tablet    metoprolol succinate (TOPROL-XL) 100 mg 24 hr tablet    multivitamin-minerals (CENTRUM ADULTS) tablet    nitroglycerin (NITROSTAT) 0 3 mg SL tablet    omeprazole (PriLOSEC) 40 MG capsule    predniSONE 10 mg tablet    saccharomyces boulardii (FLORASTOR) 250 mg capsule       Labs:  Results from last 7 days   Lab Units 04/17/21  0510 04/16/21  0458 04/15/21  0601   WBC Thousand/uL 16 46* 14 44* 14 15*   HEMOGLOBIN g/dL 9 9* 10 1* 10 6*   HEMATOCRIT % 32 6* 34 0* 34 9*   NEUTROS PCT % 86* 87* 86*   LYMPHS PCT % 6* 4* 4*   MONOS PCT % 7 8 8   EOS PCT % 0 0 1     Results from last 7 days   Lab Units 04/17/21  0510 04/16/21  0458 04/15/21  0600   POTASSIUM mmol/L 3 9 4 2 3 0*   CHLORIDE mmol/L 98* 99* 96*   CO2 mmol/L 30 34* 31   BUN mg/dL 18 20 19   CREATININE mg/dL 0 98 0 97 1 11   CALCIUM mg/dL 8 7 8 6 8 7   ALK PHOS U/L  --  98 124*   ALT U/L  --  20 22   AST U/L  --  21 15     Lab Results   Component Value Date    TROPONINI 0 05 (H) 04/15/2021    TROPONINI 0 04 04/06/2021    TROPONINI 0 05 (H) 04/06/2021    CKMB 1 7 04/07/2021    CKMB 1 9 04/06/2021    CKTOTAL 24 (L) 04/15/2021    CKTOTAL 193 04/07/2021    CKTOTAL 341 (H) 04/06/2021     Results from last 7 days   Lab Units 04/16/21  0458 04/15/21  0600   INR  1 27* 1 21*     Lab Results   Component Value Date    BLOODCX No Growth at 24 hrs  04/15/2021    BLOODCX No Growth at 24 hrs  04/15/2021    BLOODCX No Growth After 5 Days  04/06/2021    BLOODCX No Growth After 5 Days  04/06/2021         Imaging:  Results for orders placed during the hospital encounter of 04/15/21   XR chest portable    Narrative CHEST     INDICATION:   Acute Resp Failure  Patient has confirmed COVID-19  COMPARISON:  April 6, 2021    EXAM PERFORMED/VIEWS:  XR CHEST PORTABLE      FINDINGS:  There are median sternotomy wires indicating prior cardiac surgery  Cardiomediastinal silhouette appears unremarkable  Bilateral groundglass opacities which have progressed mildly from the prior study  No pleural effusion or pneumothorax  Osseous structures appear within normal limits for patient age  Impression Mild progression of bilateral groundglass opacities  In the setting of clinically suspected/proven COVID-19, this plain film appearance while nonspecific, can be seen in cases of viral pneumonia such as COVID-19  Workstation performed: [id number]       Results for orders placed during the hospital encounter of 09/11/19   XR chest 2 views    Narrative CHEST     INDICATION:   Chest Pain  COMPARISON:  8/20/2019  EXAM PERFORMED/VIEWS:  XR CHEST PA & LATERAL  The frontal view was performed utilizing dual energy radiographic technique  Images: 4    FINDINGS:  There are median sternotomy wires indicating prior cardiac surgery  Cardiomediastinal silhouette appears unremarkable  The lungs are clear  No pneumothorax or pleural effusion  Osseous structures appear within normal limits for patient age  Impression No acute cardiopulmonary disease          Workstation performed: [id number]         Last 24 Hours Medication List:   Current Facility-Administered Medications   Medication Dose Route Frequency Provider Last Rate    acetaminophen  650 mg Oral Q6H PRN Christel Mcdaniels MD      albuterol  2 puff Inhalation Q4H PRN Christel Mcdaniels MD      apixaban  2 5 mg Oral BID Christel Mcdaniels MD      ascorbic acid  1,000 mg Oral Q12H Christiano Rodriguez MD      aspirin  81 mg Oral Daily Chris Monday, MD      atorvastatin  40 mg Oral Daily Chris Monday, MD      cholecalciferol  2,000 Units Oral Daily Chris Monday, MD      dexamethasone  6 mg Intravenous Q24H Chris Monday, MD      famotidine  20 mg Oral BID Chris Monday, MD      ferrous sulfate  325 mg Oral Daily With Jerman Catalan MD      insulin lispro  2-12 Units Subcutaneous TID AC Chris Monday, MD      mesalamine  4 g Rectal HS Chris Monday, MD      metoprolol succinate  100 mg Oral Daily Chris Monday, MD      zinc sulfate  220 mg Oral Daily Chris Monday, MD      Followed by   Shaneka Lew ON 4/22/2021] multivitamin-minerals  1 tablet Oral Daily Chris Monday, MD      ondansetron  4 mg Intravenous Q6H PRN Chris Monday, MD      saccharomyces boulardii  250 mg Oral BID Chris Monday, MD          Today, Patient Was Seen By: Chris Andrade MD    ** Please Note: Dictation voice to text software may have been used in the creation of this document   **

## 2021-04-17 NOTE — ASSESSMENT & PLAN NOTE
Patient takes Lasix, Toprol-XL and Zestril at home  Ctn on Toprol-XL    Hold Lasix  Monitor vitals as per protocol

## 2021-04-17 NOTE — ASSESSMENT & PLAN NOTE
Patient is requiring 2 L of supplemental oxygen in ER  This morning patient is on 4 L of supplemental oxygen  Likely secondary to COVID-19 pneumonia  Wean oxygen as tolerated  Procalcitonin x2 is normal   Discontinue antibiotics    See above

## 2021-04-17 NOTE — ASSESSMENT & PLAN NOTE
 Mild COVID pathway as below    COVID19- positive on 4/01/2021   Supplemental oxygen with  2 L saturating 94-96% on admission   Patient is requiring 4 L of supplemental oxygen this morning  Wean as tolerated   CXR revealed " Mild progression of bilateral groundglass opacities "  Chest CT scan revealed "No pulmonary embolus  Extensive new bilateral groundglass opacity and septal thickening, likely a combination of Covid-19 pneumonia and edema  Redemonstration of juxtapleural nodules  The previous parenchymal nodules are not well demonstrated due to the new groundglass opacity  Persistent mediastinal lymphadenopathy  These abnormalities should be reevaluated in 3 months, after clearing of acute infection   "    CBC and CMP daily   Cardiac markers: troponin, CK- 24, BNP-7995   Inflammatory markers: CRP-62 3-98 6-83 3, D-Dimer-1 53-0 92-2 05, and ferritin-308   Vitamin D3 2000 IU daily, vitamin C 1g PO BID, zinc 220mg PO daily    Continue on dexamethasone and Pepcid   Patient recently completed the course of remdesivir   Consideration for convalescent plasma therapy as symptoms having greater than 5 days   OOB TID; ambulation and mobilization strongly encouraged   PT consult and evaluation    Self proning strongly encouraged   Supportive care   Was started on Levaquin and doxycycline  Procalcitonin is negative x2  Will discontinue antibiotics   CT PE study was negative for any pulmonary embolism  D-dimer this morning is 2 05    Will switch patient to Eliquis and order venous duplex of bilateral lower extremity

## 2021-04-17 NOTE — ASSESSMENT & PLAN NOTE
History of small cell lymphocytic lymphoma  Diagnosed with lymphoma in 1996 and was treated at [facility]    In remission  Follows up with the oncologist at [facility]  Patient is scheduled to have PET scan on April 30th as outpatient

## 2021-04-17 NOTE — NURSING NOTE
Pt had been on 2L of oxygen nasal cannula  Pt couldn't keep SpO2 above 88%  Pt sitting in hi fowlers and encouraged to take deep breaths through nose  Pulse oximeter probe inspected, no issues found  Increased pts oxygen to 3 L and notified PA  Pt is currently at 92% on 3L O2  Will monitor

## 2021-04-17 NOTE — CONSULTS
Consult Note - Pulmonary and [address] 77 y o  male MRN: [id number]  Unit/Bed#: [facility] Encounter: [id number]    Consult requested location: Unit/Bed#: [facility]  Physician Requesting Consult: Katrina Evans MD   Reason for Consult:  Acute hypoxic respiratory failure COVID-19 pneumonia    HPI:    80-year-old male with past medical history of multiple malignancies presents for acute hypoxic respiratory failure secondary to pneumonia  Patient currently states that his shortness of breath is fairly mild  Complains of cough with usually clear sputum  He occasionally has bloody sputum  No nausea or vomiting  Does not complain of subjective fever  ROS:  Twelve point review of systems was negative except for what is otherwise mentioned      Family history:  Possible paternal hypertension    Past Medical Hx  Multiple malignancies  Hypertension  Type 2 diabetes  Atrial fibrillation      Past Surgical Hx  Aortic valve replacement  Colectomy    Occupational History:     No known previous inhalation injuries       Medications    Current Facility-Administered Medications:     acetaminophen (TYLENOL) tablet 650 mg, 650 mg, Oral, Q6H PRN, Katrina Evans MD    albuterol (PROVENTIL HFA,VENTOLIN HFA) inhaler 2 puff, 2 puff, Inhalation, Q4H PRN, Katrina Evans MD    apixaban (ELIQUIS) tablet 2 5 mg, 2 5 mg, Oral, BID, Katrina Evans MD, 2 5 mg at 04/17/21 0811    ascorbic acid (VITAMIN C) tablet 1,000 mg, 1,000 mg, Oral, Q12H [facility], Katrina Evans MD, 1,000 mg at 04/17/21 0811    aspirin (ECOTRIN LOW STRENGTH) EC tablet 81 mg, 81 mg, Oral, Daily, Katrina Evans MD, 81 mg at 04/17/21 0810    atorvastatin (LIPITOR) tablet 40 mg, 40 mg, Oral, Daily, Katrina Evans MD, 40 mg at 04/17/21 0811    cholecalciferol (VITAMIN D3) tablet 2,000 Units, 2,000 Units, Oral, Daily, Katrina Evans MD, 2,000 Units at 04/17/21 0811    dexamethasone (DECADRON) injection 6 mg, 6 mg, Intravenous, Q24H, Kimo Irwin MD, 6 mg at 04/17/21 0811    famotidine (PEPCID) tablet 20 mg, 20 mg, Oral, BID, Kimo Irwin MD, 20 mg at 04/17/21 3684    ferrous sulfate tablet 325 mg, 325 mg, Oral, Daily With Breakfast, Kimo Irwin MD, 325 mg at 04/17/21 0810    insulin lispro (HumaLOG) 100 units/mL subcutaneous injection 2-12 Units, 2-12 Units, Subcutaneous, TID AC, 8 Units at 04/16/21 1748 **AND** Fingerstick Glucose (POCT), , , TID AC, Kimo Irwin MD    mesalamine (ROWASA) enema 4 g, 4 g, Rectal, HS, Kimo Irwin MD    metoprolol succinate (TOPROL-XL) 24 hr tablet 100 mg, 100 mg, Oral, Daily, Kimo Irwin MD, 100 mg at 04/17/21 8593    zinc sulfate (ZINCATE) capsule 220 mg, 220 mg, Oral, Daily, 220 mg at 04/17/21 0811 **FOLLOWED BY** [START ON 4/22/2021] multivitamin-minerals (CENTRUM ADULTS) tablet 1 tablet, 1 tablet, Oral, Daily, Kimo Irwin MD    ondansetron (ZOFRAN) injection 4 mg, 4 mg, Intravenous, Q6H PRN, Kimo Irwin MD    saccharomyces boulardii (FLORASTOR) capsule 250 mg, 250 mg, Oral, BID, Kimo Irwin MD, 250 mg at 04/17/21 2008       Social History:   Previously smoked 1 pack per day for 40 years  Quit approximately 10 years ago  Vitals: Blood pressure 134/68, pulse 69, temperature 100 1 °F (37 8 °C), resp  rate 18, height 5' 11" (1 803 m), weight 73 2 kg (161 lb 6 oz), SpO2 90 %  , Body mass index is 22 51 kg/m²  Physical Exam:  GEN  NAD  HEENT  ncat, non icteric, MM moist  NECK  supple, no JVD, no LAD  CV  +s1s2, no mrg, RRR  PULM  CTA BL, no wrr  ABD  soft, ntnd, + BS  EXT +lower extremity pitting edema, no cyanosis, no clubbing  NEURO  Aox3, no focal weakness       Labs:   I personally viewed and interpreted but not limited to the following laboratory studies:  D-dimer 2 05  CRP 83 3      Imaging:  I personally viewed and interpreted the following imaging studies:  CT chest 04/15/2021 shows diffuse ground-glass opacification      Assessment:  1  Acute hypoxic respiratory failure  2  COVID-19 pneumonia      Plan:   I recommend COVID19 moderate pathway   I recommend 5 days Remdesivir   I recommend therapeutic dosing of anticoagulation of either Lovenox or heparin given the rapid increase in D-dimer in setting of COVID-19   Continue Decadron 6 mg daily   Pulmonary Medicine to follow intermittently  Pulmonary Medicine will see again on 04/19/2021  Please reach out directly for any further questions or concerns   Plan discussed with primary team      [facility]

## 2021-04-18 LAB
ANION GAP SERPL CALCULATED.3IONS-SCNC: 11 MMOL/L (ref 4–13)
BASOPHILS # BLD AUTO: 0.02 THOUSANDS/ΜL (ref 0–0.1)
BASOPHILS NFR BLD AUTO: 0 % (ref 0–1)
BUN SERPL-MCNC: 18 MG/DL (ref 5–25)
CALCIUM SERPL-MCNC: 8.5 MG/DL (ref 8.3–10.1)
CHLORIDE SERPL-SCNC: 99 MMOL/L (ref 100–108)
CO2 SERPL-SCNC: 26 MMOL/L (ref 21–32)
CREAT SERPL-MCNC: 0.84 MG/DL (ref 0.6–1.3)
EOSINOPHIL # BLD AUTO: 0 THOUSAND/ΜL (ref 0–0.61)
EOSINOPHIL NFR BLD AUTO: 0 % (ref 0–6)
ERYTHROCYTE [DISTWIDTH] IN BLOOD BY AUTOMATED COUNT: 19.4 % (ref 11.6–15.1)
GFR SERPL CREATININE-BSD FRML MDRD: 86 ML/MIN/1.73SQ M
GLUCOSE SERPL-MCNC: 154 MG/DL (ref 65–140)
GLUCOSE SERPL-MCNC: 157 MG/DL (ref 65–140)
GLUCOSE SERPL-MCNC: 226 MG/DL (ref 65–140)
GLUCOSE SERPL-MCNC: 319 MG/DL (ref 65–140)
GLUCOSE SERPL-MCNC: 329 MG/DL (ref 65–140)
HCT VFR BLD AUTO: 31.7 % (ref 36.5–49.3)
HGB BLD-MCNC: 9.4 G/DL (ref 12–17)
IMM GRANULOCYTES # BLD AUTO: 0.08 THOUSAND/UL (ref 0–0.2)
IMM GRANULOCYTES NFR BLD AUTO: 1 % (ref 0–2)
LYMPHOCYTES # BLD AUTO: 0.69 THOUSANDS/ΜL (ref 0.6–4.47)
LYMPHOCYTES NFR BLD AUTO: 5 % (ref 14–44)
MAGNESIUM SERPL-MCNC: 2.2 MG/DL (ref 1.6–2.6)
MCH RBC QN AUTO: 23.6 PG (ref 26.8–34.3)
MCHC RBC AUTO-ENTMCNC: 29.7 G/DL (ref 31.4–37.4)
MCV RBC AUTO: 79 FL (ref 82–98)
MONOCYTES # BLD AUTO: 0.84 THOUSAND/ΜL (ref 0.17–1.22)
MONOCYTES NFR BLD AUTO: 7 % (ref 4–12)
NEUTROPHILS # BLD AUTO: 11.31 THOUSANDS/ΜL (ref 1.85–7.62)
NEUTS SEG NFR BLD AUTO: 87 % (ref 43–75)
NRBC BLD AUTO-RTO: 0 /100 WBCS
PLATELET # BLD AUTO: 184 THOUSANDS/UL (ref 149–390)
POTASSIUM SERPL-SCNC: 3.9 MMOL/L (ref 3.5–5.3)
RBC # BLD AUTO: 3.99 MILLION/UL (ref 3.88–5.62)
SODIUM SERPL-SCNC: 136 MMOL/L (ref 136–145)
WBC # BLD AUTO: 12.94 THOUSAND/UL (ref 4.31–10.16)

## 2021-04-18 PROCEDURE — 80048 BASIC METABOLIC PNL TOTAL CA: CPT | Performed by: INTERNAL MEDICINE

## 2021-04-18 PROCEDURE — 83735 ASSAY OF MAGNESIUM: CPT | Performed by: INTERNAL MEDICINE

## 2021-04-18 PROCEDURE — 94760 N-INVAS EAR/PLS OXIMETRY 1: CPT

## 2021-04-18 PROCEDURE — 82948 REAGENT STRIP/BLOOD GLUCOSE: CPT

## 2021-04-18 PROCEDURE — 99232 SBSQ HOSP IP/OBS MODERATE 35: CPT | Performed by: INTERNAL MEDICINE

## 2021-04-18 PROCEDURE — 85025 COMPLETE CBC W/AUTO DIFF WBC: CPT | Performed by: INTERNAL MEDICINE

## 2021-04-18 RX ORDER — FUROSEMIDE 20 MG/1
20 TABLET ORAL 2 TIMES DAILY
Status: DISCONTINUED | OUTPATIENT
Start: 2021-04-18 | End: 2021-04-20 | Stop reason: HOSPADM

## 2021-04-18 RX ADMIN — ASPIRIN 81 MG: 81 TABLET, COATED ORAL at 08:14

## 2021-04-18 RX ADMIN — APIXABAN 2.5 MG: 2.5 TABLET, FILM COATED ORAL at 17:14

## 2021-04-18 RX ADMIN — Medication 250 MG: at 17:14

## 2021-04-18 RX ADMIN — OXYCODONE HYDROCHLORIDE AND ACETAMINOPHEN 1000 MG: 500 TABLET ORAL at 08:14

## 2021-04-18 RX ADMIN — APIXABAN 2.5 MG: 2.5 TABLET, FILM COATED ORAL at 08:14

## 2021-04-18 RX ADMIN — INSULIN LISPRO 4 UNITS: 100 INJECTION, SOLUTION INTRAVENOUS; SUBCUTANEOUS at 08:13

## 2021-04-18 RX ADMIN — FUROSEMIDE 20 MG: 20 TABLET ORAL at 08:14

## 2021-04-18 RX ADMIN — FAMOTIDINE 20 MG: 20 TABLET ORAL at 17:14

## 2021-04-18 RX ADMIN — INSULIN LISPRO 8 UNITS: 100 INJECTION, SOLUTION INTRAVENOUS; SUBCUTANEOUS at 17:13

## 2021-04-18 RX ADMIN — FERROUS SULFATE TAB 325 MG (65 MG ELEMENTAL FE) 325 MG: 325 (65 FE) TAB at 08:14

## 2021-04-18 RX ADMIN — Medication 250 MG: at 08:14

## 2021-04-18 RX ADMIN — METOPROLOL SUCCINATE 100 MG: 50 TABLET, EXTENDED RELEASE ORAL at 08:14

## 2021-04-18 RX ADMIN — FUROSEMIDE 20 MG: 20 TABLET ORAL at 17:14

## 2021-04-18 RX ADMIN — ATORVASTATIN CALCIUM 40 MG: 40 TABLET, FILM COATED ORAL at 08:14

## 2021-04-18 RX ADMIN — FAMOTIDINE 20 MG: 20 TABLET ORAL at 08:14

## 2021-04-18 RX ADMIN — DEXAMETHASONE SODIUM PHOSPHATE 6 MG: 4 INJECTION, SOLUTION INTRA-ARTICULAR; INTRALESIONAL; INTRAMUSCULAR; INTRAVENOUS; SOFT TISSUE at 08:14

## 2021-04-18 RX ADMIN — OXYCODONE HYDROCHLORIDE AND ACETAMINOPHEN 1000 MG: 500 TABLET ORAL at 21:56

## 2021-04-18 RX ADMIN — Medication 2000 UNITS: at 08:14

## 2021-04-18 RX ADMIN — ZINC SULFATE 220 MG (50 MG) CAPSULE 220 MG: CAPSULE at 08:14

## 2021-04-18 RX ADMIN — INSULIN LISPRO 8 UNITS: 100 INJECTION, SOLUTION INTRAVENOUS; SUBCUTANEOUS at 11:44

## 2021-04-18 NOTE — ASSESSMENT & PLAN NOTE
Patient takes Lasix, Toprol-XL and Zestril at home  Ctn on Toprol-XL and Lasix    Zestril on hold  Monitor vitals as per protocol

## 2021-04-18 NOTE — ASSESSMENT & PLAN NOTE
Lab Results   Component Value Date    HGBA1C 5 8 (H) 12/30/2020       Recent Labs     04/17/21  1041 04/17/21  1526 04/17/21  2114 04/18/21  0715   POCGLU 289* 261* 236* 157*       Blood Sugar Average: Last 72 hrs:  (P) 229 hold metformin  Accu-Chek a c  HS with Humalog sliding scale

## 2021-04-18 NOTE — ASSESSMENT & PLAN NOTE
History of small cell lymphocytic lymphoma  Diagnosed with lymphoma in 1996 and was treated at [facility]    In remission  Follows up with the oncologist at [facility]  Patient is scheduled to have PET scan on April 30th as outpatient

## 2021-04-18 NOTE — ASSESSMENT & PLAN NOTE
Patient is requiring 2 L of supplemental oxygen in ER  Patient is weaned down to 3 L of supplemental oxygen  Likely secondary to COVID-19 pneumonia  Wean oxygen as tolerated  Procalcitonin x2 is normal   Antibiotics were discontinued    See above

## 2021-04-18 NOTE — ASSESSMENT & PLAN NOTE
 Mild COVID pathway as below    COVID19- positive on 4/01/2021   Supplemental oxygen with  2 L saturating 94-96% on admission   Patient is requiring 3 L of supplemental oxygen this morning  Wean as tolerated   CXR revealed " Mild progression of bilateral groundglass opacities "  Chest CT scan revealed "No pulmonary embolus  Extensive new bilateral groundglass opacity and septal thickening, likely a combination of Covid-19 pneumonia and edema  Redemonstration of juxtapleural nodules  The previous parenchymal nodules are not well demonstrated due to the new groundglass opacity  Persistent mediastinal lymphadenopathy  These abnormalities should be reevaluated in 3 months, after clearing of acute infection   "    CBC and CMP daily   Cardiac markers: troponin, CK- 24, BNP-7995   Inflammatory markers: CRP-62 3-98 6-83 3, D-Dimer-1 53-0 92-2 05, and ferritin-308   Vitamin D3 2000 IU daily, vitamin C 1g PO BID, zinc 220mg PO daily    Continue on dexamethasone and Pepcid   Patient recently completed the course of remdesivir   Consideration for convalescent plasma therapy as symptoms having greater than 5 days   OOB TID; ambulation and mobilization strongly encouraged   PT consult and evaluation    Self proning strongly encouraged   Supportive care   Was started on Levaquin and doxycycline  Procalcitonin is negative x2  Antibiotics were discontinued   CT PE study was negative for any pulmonary embolism  D-dimer this morning is 2 05    Continue on Eliquis   Follow-up Venous duplex of bilateral lower extremity   Trend inflammatory markers in a m

## 2021-04-18 NOTE — PROGRESS NOTES
[place]  Progress Note - Herminia Face 1945, 77 y o  male MRN: [id number]  Unit/Bed#: -01 Encounter: [id number]  Primary Care Provider: Joselyn Guidry DO   Date and time admitted to hospital: 4/15/2021  5:43 AM    Hypokalemia  Assessment & Plan  Resolved  Monitor and replace electrolytes as needed    Severe sepsis [facility])  Assessment & Plan  Patient presented with fever, tachycardia, tachypnea, leukocytosis and lactate of 3 6  Admitted with severe sepsis secondary to underlying COVID pneumonia  Received IV fluid in ER  DC antibiotics as procalcitonin is normal  Follow-up culture results  See above    Gastroesophageal reflux disease with esophagitis  Assessment & Plan  On Pepcid    Acute respiratory failure with hypoxia [facility])  Assessment & Plan  Patient is requiring 2 L of supplemental oxygen in ER  Patient is weaned down to 3 L of supplemental oxygen  Likely secondary to COVID-19 pneumonia  Wean oxygen as tolerated  Procalcitonin x2 is normal   Antibiotics were discontinued  See above    Essential hypertension  Assessment & Plan  Patient takes Lasix, Toprol-XL and Zestril at home  Ctn on Toprol-XL and Lasix  Zestril on hold  Monitor vitals as per protocol    Lymphoma [facility])  Assessment & Plan  History of small cell lymphocytic lymphoma  Diagnosed with lymphoma in 1996 and was treated at [facility]    In remission  Follows up with the oncologist at [facility]  Patient is scheduled to have PET scan on April 30th as outpatient    Dyslipidemia  Assessment & Plan  On statin    Type 2 diabetes mellitus without complication, without long-term current use of insulin [facility])  Assessment & Plan  Lab Results   Component Value Date    HGBA1C 5 8 (H) 12/30/2020       Recent Labs     04/17/21  1041 04/17/21  1526 04/17/21  2114 04/18/21  0715   POCGLU 289* 261* 236* 157*       Blood Sugar Average: Last 72 hrs:  (P) 229 hold metformin  Accu-Chek a c  HS with Humalog sliding scale     * Pneumonia due to COVID-19 virus  Assessment & Plan   Mild COVID pathway as below    COVID19- positive on 4/01/2021   Supplemental oxygen with  2 L saturating 94-96% on admission   Patient is requiring 3 L of supplemental oxygen this morning  Wean as tolerated   CXR revealed " Mild progression of bilateral groundglass opacities "  Chest CT scan revealed "No pulmonary embolus  Extensive new bilateral groundglass opacity and septal thickening, likely a combination of Covid-19 pneumonia and edema  Redemonstration of juxtapleural nodules  The previous parenchymal nodules are not well demonstrated due to the new groundglass opacity  Persistent mediastinal lymphadenopathy  These abnormalities should be reevaluated in 3 months, after clearing of acute infection   "    CBC and CMP daily   Cardiac markers: troponin, CK- 24, BNP-7995   Inflammatory markers: CRP-62 3-98 6-83 3, D-Dimer-1 53-0 92-2 05, and ferritin-308   Vitamin D3 2000 IU daily, vitamin C 1g PO BID, zinc 220mg PO daily    Continue on dexamethasone and Pepcid   Patient recently completed the course of remdesivir   Consideration for convalescent plasma therapy as symptoms having greater than 5 days   OOB TID; ambulation and mobilization strongly encouraged   PT consult and evaluation    Self proning strongly encouraged   Supportive care   Was started on Levaquin and doxycycline  Procalcitonin is negative x2  Antibiotics were discontinued   CT PE study was negative for any pulmonary embolism  D-dimer this morning is 2 05    Continue on Eliquis   Follow-up Venous duplex of bilateral lower extremity   Trend inflammatory markers in a m  Labs & Imaging: I have personally reviewed pertinent reports        VTE Pharmacologic Prophylaxis:  Eliquis  VTE Mechanical Prophylaxis: sequential compression device    Code Status:   Level 1 - Full Code    Patient Centered Rounds: I have performed bedside rounds with nursing staff today     Discussions with Specialists or Other Care Team Provider:  Pulmonary    Education and Discussions with Family / Patient:  Wife Vahe    Current Length of Stay: 3 day(s)    Current Patient Status: Inpatient   Certification Statement: The patient will continue to require additional inpatient hospital stay due to see my assessment and plan  Subjective:   Patient is seen and examined at bedside  Denies any new complaints  Still with some shortness of breath  T-max of 100 1°  Patient is weaned down to 3 L of supplemental oxygen  Denies any diarrhea, abdominal or chest pain  All other ROS are negative  Objective:    Vitals: Blood pressure 152/72, pulse 58, temperature (!) 97 4 °F (36 3 °C), resp  rate 18, height 5' 11" (1 803 m), weight 72 6 kg (160 lb 0 9 oz), SpO2 94 %  ,Body mass index is 22 32 kg/m²  SPO2 RA Rest      ED to Hosp-Admission (Current) from 4/15/2021 in [address] Med Surg [address]   SpO2  94 %   SpO2 Activity  At Rest   O2 Device  Nasal cannula   O2 Flow Rate  --        I&O:     Intake/Output Summary (Last 24 hours) at 4/18/2021 0743  Last data filed at 4/18/2021 0519  Gross per 24 hour   Intake 222 ml   Output 1325 ml   Net -1103 ml       Physical Exam:    General- Alert, lying comfortably in bed  Not in any acute distress  CVS- regular, S1 and S2 normal   Chest- Bilateral Air entry, No rhochi, crackles or wheezing present  Abdomen- soft, nontender, not distended, no guarding or rigidity, BS+  Extremities-  No pedal edema, No calf tenderness  CNS-   Alert, awake and orientedx3  No focal deficits present      Invasive Devices     Peripheral Intravenous Line            Peripheral IV 04/15/21 Right Antecubital 3 days    Peripheral IV 04/15/21 Right Forearm 3 days                      Social History  reviewed  Family History   Problem Relation Age of Onset    Heart block Family     Coronary artery disease Mother     Thrombosis Mother     Hypertension Mother    July Belisaiah Arthritis Mother     Hyperlipidemia Mother     Diabetes Father     Hypertension Father     Arthritis Father     Sudden death Father         cardiac    Colon cancer Paternal Grandfather     Breast cancer Sister     Heart disease Brother         Coronary stents    reviewed    Meds:  Current Facility-Administered Medications   Medication Dose Route Frequency Provider Last Rate Last Admin    acetaminophen (TYLENOL) tablet 650 mg  650 mg Oral Q6H PRN Christel Mcdaniels MD        albuterol (PROVENTIL HFA,VENTOLIN HFA) inhaler 2 puff  2 puff Inhalation Q4H PRN Christel Mcdaniels MD        apixaban (ELIQUIS) tablet 2 5 mg  2 5 mg Oral BID Christel Mcdaniels MD   2 5 mg at 04/17/21 1803    ascorbic acid (VITAMIN C) tablet 1,000 mg  1,000 mg Oral Q12H Jose Manuel Day MD   1,000 mg at 04/17/21 2226    aspirin (ECOTRIN LOW STRENGTH) EC tablet 81 mg  81 mg Oral Daily Christel Mcdaniels MD   81 mg at 04/17/21 0810    atorvastatin (LIPITOR) tablet 40 mg  40 mg Oral Daily Christel Mcdaniels MD   40 mg at 04/17/21 0811    cholecalciferol (VITAMIN D3) tablet 2,000 Units  2,000 Units Oral Daily Christel Mcdaniels MD   2,000 Units at 04/17/21 0811    dexamethasone (DECADRON) injection 6 mg  6 mg Intravenous Q24H Christel Mcdaniels MD   6 mg at 04/17/21 0811    famotidine (PEPCID) tablet 20 mg  20 mg Oral BID Christel Mcdaniels MD   20 mg at 04/17/21 1803    ferrous sulfate tablet 325 mg  325 mg Oral Daily With Breakfast Christel Mcdaniels MD   325 mg at 04/17/21 0810    furosemide (LASIX) tablet 20 mg  20 mg Oral BID Christel Mcdaniels MD        insulin lispro (HumaLOG) 100 units/mL subcutaneous injection 2-12 Units  2-12 Units Subcutaneous TID AC Christel Mcdaniels MD   6 Units at 04/17/21 1802    mesalamine (ROWASA) enema 4 g  4 g Rectal HS Christel Mcdaniels MD        metoprolol succinate (TOPROL-XL) 24 hr tablet 100 mg  100 mg Oral Daily Christel Mcdaniels MD   100 mg at 04/17/21 0811    zinc sulfate (ZINCATE) capsule 220 mg  220 mg Oral Daily Jordana Millan MD   220 mg at 04/17/21 1374    Followed by   Lena Alex ON 4/22/2021] multivitamin-minerals (CENTRUM ADULTS) tablet 1 tablet  1 tablet Oral Daily Jordana Millan MD        ondansetron [facility]) injection 4 mg  4 mg Intravenous Q6H PRN Jordana Millan MD        saccharomyces boulardii (FLORASTOR) capsule 250 mg  250 mg Oral BID Jordana Millan MD   250 mg at 04/17/21 1803      Medications Prior to Admission   Medication    aspirin 81 MG tablet    atorvastatin (LIPITOR) 40 mg tablet    cholecalciferol (VITAMIN D3) 1,000 units tablet    diphenoxylate-atropine (LOMOTIL) 2 5-0 025 mg per tablet    ferrous sulfate 324 MG TBEC    furosemide (LASIX) 20 mg tablet    lisinopril (ZESTRIL) 20 mg tablet    mesalamine (ROWASA) 4 g    metFORMIN (GLUCOPHAGE-XR) 500 mg 24 hr tablet    metoprolol succinate (TOPROL-XL) 100 mg 24 hr tablet    multivitamin-minerals (CENTRUM ADULTS) tablet    nitroglycerin (NITROSTAT) 0 3 mg SL tablet    omeprazole (PriLOSEC) 40 MG capsule    predniSONE 10 mg tablet    saccharomyces boulardii (FLORASTOR) 250 mg capsule       Labs:  Results from last 7 days   Lab Units 04/18/21  0529 04/17/21  0510 04/16/21  0458   WBC Thousand/uL 12 94* 16 46* 14 44*   HEMOGLOBIN g/dL 9 4* 9 9* 10 1*   HEMATOCRIT % 31 7* 32 6* 34 0*   NEUTROS PCT % 87* 86* 87*   LYMPHS PCT % 5* 6* 4*   MONOS PCT % 7 7 8   EOS PCT % 0 0 0     Results from last 7 days   Lab Units 04/18/21  0529 04/17/21  0510 04/16/21  0458 04/15/21  0600   POTASSIUM mmol/L 3 9 3 9 4 2 3 0*   CHLORIDE mmol/L 99* 98* 99* 96*   CO2 mmol/L 26 30 34* 31   BUN mg/dL 18 18 20 19   CREATININE mg/dL 0 84 0 98 0 97 1 11   CALCIUM mg/dL 8 5 8 7 8 6 8 7   ALK PHOS U/L  --   --  98 124*   ALT U/L  --   --  20 22   AST U/L  --   --  21 15     Lab Results   Component Value Date    TROPONINI 0 05 (H) 04/15/2021    TROPONINI 0 04 04/06/2021    TROPONINI 0 05 (H) 04/06/2021    CKMB 1 7 04/07/2021 CKMB 1 9 04/06/2021    CKTOTAL 24 (L) 04/15/2021    CKTOTAL 193 04/07/2021    CKTOTAL 341 (H) 04/06/2021     Results from last 7 days   Lab Units 04/16/21  0458 04/15/21  0600   INR  1 27* 1 21*     Lab Results   Component Value Date    BLOODCX No Growth at 48 hrs  04/15/2021    BLOODCX No Growth at 48 hrs  04/15/2021    BLOODCX No Growth After 5 Days  04/06/2021    BLOODCX No Growth After 5 Days  04/06/2021         Imaging:  Results for orders placed during the hospital encounter of 04/15/21   XR chest portable    Narrative CHEST     INDICATION:   Acute Resp Failure  Patient has confirmed COVID-19  COMPARISON:  April 6, 2021    EXAM PERFORMED/VIEWS:  XR CHEST PORTABLE      FINDINGS:  There are median sternotomy wires indicating prior cardiac surgery  Cardiomediastinal silhouette appears unremarkable  Bilateral groundglass opacities which have progressed mildly from the prior study  No pleural effusion or pneumothorax  Osseous structures appear within normal limits for patient age  Impression Mild progression of bilateral groundglass opacities  In the setting of clinically suspected/proven COVID-19, this plain film appearance while nonspecific, can be seen in cases of viral pneumonia such as COVID-19  Workstation performed: [id number]       Results for orders placed during the hospital encounter of 09/11/19   XR chest 2 views    Narrative CHEST     INDICATION:   Chest Pain  COMPARISON:  8/20/2019  EXAM PERFORMED/VIEWS:  XR CHEST PA & LATERAL  The frontal view was performed utilizing dual energy radiographic technique  Images: 4    FINDINGS:  There are median sternotomy wires indicating prior cardiac surgery  Cardiomediastinal silhouette appears unremarkable  The lungs are clear  No pneumothorax or pleural effusion  Osseous structures appear within normal limits for patient age  Impression No acute cardiopulmonary disease          Workstation performed: [id number]         Last 24 Hours Medication List:   Current Facility-Administered Medications   Medication Dose Route Frequency Provider Last Rate    acetaminophen  650 mg Oral Q6H PRN Rina Milligan MD      albuterol  2 puff Inhalation Q4H PRN Rina Milligan MD      apixaban  2 5 mg Oral BID Rina Milligan MD      ascorbic acid  1,000 mg Oral Q12H Eddie Trejo MD      aspirin  81 mg Oral Daily Rina Milligan MD      atorvastatin  40 mg Oral Daily Rina Milligan MD      cholecalciferol  2,000 Units Oral Daily Rina Milligan MD      dexamethasone  6 mg Intravenous Q24H Rina Milligan MD      famotidine  20 mg Oral BID Rina Milligan MD      ferrous sulfate  325 mg Oral Daily With Dipak Nelson MD      furosemide  20 mg Oral BID Rina Milligan MD      insulin lispro  2-12 Units Subcutaneous TID AC Rina Milligan MD      mesalamine  4 g Rectal HS Rina Milligan MD      metoprolol succinate  100 mg Oral Daily Rina Milligan MD      zinc sulfate  220 mg Oral Daily Rina Milligan MD      Followed by   Raghav Zendejas ON 4/22/2021] multivitamin-minerals  1 tablet Oral Daily Rina Milligan MD      ondansetron  4 mg Intravenous Q6H PRN Rina Milligan MD      saccharomyces boulardii  250 mg Oral BID Rina Milligan MD          Today, Patient Was Seen By: Rina Milligan MD    ** Please Note: Dictation voice to text software may have been used in the creation of this document   **

## 2021-04-19 LAB
ANION GAP SERPL CALCULATED.3IONS-SCNC: 9 MMOL/L (ref 4–13)
BASOPHILS # BLD MANUAL: 0 THOUSAND/UL (ref 0–0.1)
BASOPHILS NFR MAR MANUAL: 0 % (ref 0–1)
BUN SERPL-MCNC: 19 MG/DL (ref 5–25)
BURR CELLS BLD QL SMEAR: PRESENT
CALCIUM SERPL-MCNC: 8.4 MG/DL (ref 8.3–10.1)
CHLORIDE SERPL-SCNC: 100 MMOL/L (ref 100–108)
CO2 SERPL-SCNC: 29 MMOL/L (ref 21–32)
CREAT SERPL-MCNC: 0.85 MG/DL (ref 0.6–1.3)
CRP SERPL QL: 70.1 MG/L
D DIMER PPP FEU-MCNC: 1.08 UG/ML FEU
EOSINOPHIL # BLD MANUAL: 0 THOUSAND/UL (ref 0–0.4)
EOSINOPHIL NFR BLD MANUAL: 0 % (ref 0–6)
ERYTHROCYTE [DISTWIDTH] IN BLOOD BY AUTOMATED COUNT: 19 % (ref 11.6–15.1)
FERRITIN SERPL-MCNC: 528 NG/ML (ref 8–388)
GFR SERPL CREATININE-BSD FRML MDRD: 86 ML/MIN/1.73SQ M
GLUCOSE SERPL-MCNC: 140 MG/DL (ref 65–140)
GLUCOSE SERPL-MCNC: 143 MG/DL (ref 65–140)
GLUCOSE SERPL-MCNC: 261 MG/DL (ref 65–140)
GLUCOSE SERPL-MCNC: 289 MG/DL (ref 65–140)
GLUCOSE SERPL-MCNC: 322 MG/DL (ref 65–140)
HCT VFR BLD AUTO: 21.3 % (ref 36.5–49.3)
HCT VFR BLD AUTO: 28.8 % (ref 36.5–49.3)
HGB BLD-MCNC: 6.3 G/DL (ref 12–17)
HGB BLD-MCNC: 8.9 G/DL (ref 12–17)
LYMPHOCYTES # BLD AUTO: 0.48 THOUSAND/UL (ref 0.6–4.47)
LYMPHOCYTES # BLD AUTO: 5 % (ref 14–44)
MCH RBC QN AUTO: 24 PG (ref 26.8–34.3)
MCHC RBC AUTO-ENTMCNC: 29.6 G/DL (ref 31.4–37.4)
MCV RBC AUTO: 81 FL (ref 82–98)
MONOCYTES # BLD AUTO: 0.48 THOUSAND/UL (ref 0–1.22)
MONOCYTES NFR BLD: 5 % (ref 4–12)
NEUTROPHILS # BLD MANUAL: 8.49 THOUSAND/UL (ref 1.85–7.62)
NEUTS BAND NFR BLD MANUAL: 1 % (ref 0–8)
NEUTS SEG NFR BLD AUTO: 87 % (ref 43–75)
NRBC BLD AUTO-RTO: 0 /100 WBCS
PLATELET # BLD AUTO: 211 THOUSANDS/UL (ref 149–390)
PLATELET BLD QL SMEAR: ABNORMAL
PLATELET CLUMP BLD QL SMEAR: PRESENT
POIKILOCYTOSIS BLD QL SMEAR: PRESENT
POTASSIUM SERPL-SCNC: 4 MMOL/L (ref 3.5–5.3)
RBC # BLD AUTO: 2.63 MILLION/UL (ref 3.88–5.62)
RBC MORPH BLD: PRESENT
SODIUM SERPL-SCNC: 138 MMOL/L (ref 136–145)
TOTAL CELLS COUNTED SPEC: 100
VARIANT LYMPHS # BLD AUTO: 2 %
WBC # BLD AUTO: 9.65 THOUSAND/UL (ref 4.31–10.16)

## 2021-04-19 PROCEDURE — 80048 BASIC METABOLIC PNL TOTAL CA: CPT | Performed by: INTERNAL MEDICINE

## 2021-04-19 PROCEDURE — 85014 HEMATOCRIT: CPT | Performed by: INTERNAL MEDICINE

## 2021-04-19 PROCEDURE — 86140 C-REACTIVE PROTEIN: CPT | Performed by: INTERNAL MEDICINE

## 2021-04-19 PROCEDURE — 85379 FIBRIN DEGRADATION QUANT: CPT | Performed by: INTERNAL MEDICINE

## 2021-04-19 PROCEDURE — 82948 REAGENT STRIP/BLOOD GLUCOSE: CPT

## 2021-04-19 PROCEDURE — 85027 COMPLETE CBC AUTOMATED: CPT | Performed by: INTERNAL MEDICINE

## 2021-04-19 PROCEDURE — 82728 ASSAY OF FERRITIN: CPT | Performed by: INTERNAL MEDICINE

## 2021-04-19 PROCEDURE — 85007 BL SMEAR W/DIFF WBC COUNT: CPT | Performed by: INTERNAL MEDICINE

## 2021-04-19 PROCEDURE — 99232 SBSQ HOSP IP/OBS MODERATE 35: CPT | Performed by: INTERNAL MEDICINE

## 2021-04-19 PROCEDURE — 94761 N-INVAS EAR/PLS OXIMETRY MLT: CPT

## 2021-04-19 PROCEDURE — 85018 HEMOGLOBIN: CPT | Performed by: INTERNAL MEDICINE

## 2021-04-19 RX ADMIN — FAMOTIDINE 20 MG: 20 TABLET ORAL at 08:33

## 2021-04-19 RX ADMIN — Medication 2000 UNITS: at 08:33

## 2021-04-19 RX ADMIN — OXYCODONE HYDROCHLORIDE AND ACETAMINOPHEN 1000 MG: 500 TABLET ORAL at 08:34

## 2021-04-19 RX ADMIN — ASPIRIN 81 MG: 81 TABLET, COATED ORAL at 08:32

## 2021-04-19 RX ADMIN — APIXABAN 2.5 MG: 2.5 TABLET, FILM COATED ORAL at 17:11

## 2021-04-19 RX ADMIN — Medication 250 MG: at 08:32

## 2021-04-19 RX ADMIN — ATORVASTATIN CALCIUM 40 MG: 40 TABLET, FILM COATED ORAL at 08:34

## 2021-04-19 RX ADMIN — ZINC SULFATE 220 MG (50 MG) CAPSULE 220 MG: CAPSULE at 08:34

## 2021-04-19 RX ADMIN — FUROSEMIDE 20 MG: 20 TABLET ORAL at 08:33

## 2021-04-19 RX ADMIN — INSULIN LISPRO 8 UNITS: 100 INJECTION, SOLUTION INTRAVENOUS; SUBCUTANEOUS at 17:10

## 2021-04-19 RX ADMIN — DEXAMETHASONE SODIUM PHOSPHATE 6 MG: 4 INJECTION, SOLUTION INTRA-ARTICULAR; INTRALESIONAL; INTRAMUSCULAR; INTRAVENOUS; SOFT TISSUE at 08:34

## 2021-04-19 RX ADMIN — Medication 250 MG: at 17:10

## 2021-04-19 RX ADMIN — FUROSEMIDE 20 MG: 20 TABLET ORAL at 17:10

## 2021-04-19 RX ADMIN — FAMOTIDINE 20 MG: 20 TABLET ORAL at 17:11

## 2021-04-19 RX ADMIN — APIXABAN 2.5 MG: 2.5 TABLET, FILM COATED ORAL at 08:34

## 2021-04-19 RX ADMIN — METOPROLOL SUCCINATE 100 MG: 50 TABLET, EXTENDED RELEASE ORAL at 08:33

## 2021-04-19 RX ADMIN — INSULIN LISPRO 6 UNITS: 100 INJECTION, SOLUTION INTRAVENOUS; SUBCUTANEOUS at 12:31

## 2021-04-19 RX ADMIN — OXYCODONE HYDROCHLORIDE AND ACETAMINOPHEN 1000 MG: 500 TABLET ORAL at 21:04

## 2021-04-19 RX ADMIN — FERROUS SULFATE TAB 325 MG (65 MG ELEMENTAL FE) 325 MG: 325 (65 FE) TAB at 08:33

## 2021-04-19 NOTE — ASSESSMENT & PLAN NOTE
History of small cell lymphocytic lymphoma  Diagnosed with lymphoma in 1996 and was treated at [facility]    In remission  Follows up with the oncologist at [facility]  Patient is scheduled to have PET scan on April 30th as outpatient

## 2021-04-19 NOTE — ASSESSMENT & PLAN NOTE
Home meds; Lasix, Toprol-XL and Zestril at home  Currently on Toprol-XL and Lasix      Zestril on hold  Monitor vitals as per protocol

## 2021-04-19 NOTE — PLAN OF CARE
Problem: Potential for Falls  Goal: Patient will remain free of falls  Description: INTERVENTIONS:  - Assess patient frequently for physical needs  -  Identify cognitive and physical deficits and behaviors that affect risk of falls    -  [place] fall precautions as indicated by assessment   - Educate patient/family on patient safety including physical limitations  - Instruct patient to call for assistance with activity based on assessment  - Modify environment to reduce risk of injury  - Consider OT/PT consult to assist with strengthening/mobility  Outcome: Progressing     Problem: Prexisting or High Potential for Compromised Skin Integrity  Goal: Skin integrity is maintained or improved  Description: INTERVENTIONS:  - Identify patients at risk for skin breakdown  - Assess and monitor skin integrity  - Assess and monitor nutrition and hydration status  - Monitor labs   - Assess for incontinence   - Turn and reposition patient  - Assist with mobility/ambulation  - Relieve pressure over bony prominences  - Avoid friction and shearing  - Provide appropriate hygiene as needed including keeping skin clean and dry  - Evaluate need for skin moisturizer/barrier cream  - Collaborate with interdisciplinary team   - Patient/family teaching  - Consider wound care consult   Outcome: Progressing     Problem: PAIN - ADULT  Goal: Verbalizes/displays adequate comfort level or baseline comfort level  Description: Interventions:  - Encourage patient to monitor pain and request assistance  - Assess pain using appropriate pain scale  - Administer analgesics based on type and severity of pain and evaluate response  - Implement non-pharmacological measures as appropriate and evaluate response  - Consider cultural and social influences on pain and pain management  - Notify physician/advanced practitioner if interventions unsuccessful or patient reports new pain  Outcome: Progressing     Problem: INFECTION - ADULT  Goal: Absence or prevention of progression during hospitalization  Description: INTERVENTIONS:  - Assess and monitor for signs and symptoms of infection  - Monitor lab/diagnostic results  - Monitor all insertion sites, i e  indwelling lines, tubes, and drains  - Monitor endotracheal if appropriate and nasal secretions for changes in amount and color  - [place] appropriate cooling/warming therapies per order  - Administer medications as ordered  - Instruct and encourage patient and family to use good hand hygiene technique  - Identify and instruct in appropriate isolation precautions for identified infection/condition  Outcome: Progressing  Goal: Absence of fever/infection during neutropenic period  Description: INTERVENTIONS:  - Monitor WBC    Outcome: Progressing     Problem: SAFETY ADULT  Goal: Patient will remain free of falls  Description: INTERVENTIONS:  - Assess patient frequently for physical needs  -  Identify cognitive and physical deficits and behaviors that affect risk of falls    -  [place] fall precautions as indicated by assessment   - Educate patient/family on patient safety including physical limitations  - Instruct patient to call for assistance with activity based on assessment  - Modify environment to reduce risk of injury  - Consider OT/PT consult to assist with strengthening/mobility  Outcome: Progressing  Goal: Maintain or return to baseline ADL function  Description: INTERVENTIONS:  -  Assess patient's ability to carry out ADLs; assess patient's baseline for ADL function and identify physical deficits which impact ability to perform ADLs (bathing, care of mouth/teeth, toileting, grooming, dressing, etc )  - Assess/evaluate cause of self-care deficits   - Assess range of motion  - Assess patient's mobility; develop plan if impaired  - Assess patient's need for assistive devices and provide as appropriate  - Encourage maximum independence but intervene and supervise when necessary  - Involve family in performance of ADLs  - Assess for home care needs following discharge   - Consider OT consult to assist with ADL evaluation and planning for discharge  - Provide patient education as appropriate  Outcome: Progressing  Goal: Maintain or return mobility status to optimal level  Description: INTERVENTIONS:  - Assess patient's baseline mobility status (ambulation, transfers, stairs, etc )    - Identify cognitive and physical deficits and behaviors that affect mobility  - Identify mobility aids required to assist with transfers and/or ambulation (gait belt, sit-to-stand, lift, walker, cane, etc )  - [place] fall precautions as indicated by assessment  - Record patient progress and toleration of activity level on Mobility SBAR; progress patient to next Phase/Stage  - Instruct patient to call for assistance with activity based on assessment  - Consider rehabilitation consult to assist with strengthening/weightbearing, etc   Outcome: Progressing     Problem: DISCHARGE PLANNING  Goal: Discharge to home or other facility with appropriate resources  Description: INTERVENTIONS:  - Identify barriers to discharge w/patient and caregiver  - Arrange for needed discharge resources and transportation as appropriate  - Identify discharge learning needs (meds, wound care, etc )  - Arrange for interpretive services to assist at discharge as needed  - Refer to Case Management Department for coordinating discharge planning if the patient needs post-hospital services based on physician/advanced practitioner order or complex needs related to functional status, cognitive ability, or social support system  Outcome: Progressing     Problem: Knowledge Deficit  Goal: Patient/family/caregiver demonstrates understanding of disease process, treatment plan, medications, and discharge instructions  Description: Complete learning assessment and assess knowledge base    Interventions:  - Provide teaching at level of understanding  - Provide teaching via preferred learning methods  Outcome: Progressing

## 2021-04-19 NOTE — ASSESSMENT & PLAN NOTE
Severe sepsis, poa, a/e/b fever, tachycardia, tachypnea, leukocytosis and lactate of 3 6 secondary to COVID pneumonia  Management highlighted above

## 2021-04-19 NOTE — ASSESSMENT & PLAN NOTE
Pneumonia due to COVID infection, poa, a/e/b sob, tachypnea, hypoxia 86% RA > 6L NC  Diagnosed with COVID19 on 4/01/2021  Continue with supplemental oxygen, Wean as tolerated  CXR revealed " Mild progression of bilateral groundglass opacities "  Chest CT scan revealed "No pulmonary embolus  Extensive new bilateral groundglass opacity and septal thickening, likely a combination of Covid-19 pneumonia and edema  Redemonstration of juxtapleural nodules  The previous parenchymal nodules are not well demonstrated due to the new groundglass opacity  Persistent mediastinal lymphadenopathy  These abnormalities should be reevaluated in 3 months, after clearing of acute infection   "   · Cardiac markers: troponin, CK- 24, BNP-7995   On Vitamin D3 2000 IU daily, vitamin C 1g PO BID, zinc 220mg PO daily    Continue on dexamethasone and Pepcid   Patient recently completed the course of remdesivir   Does not qualify for convalescent plasma therapy as symptoms > than 5 days   Encourage Self proning    Initiallystarted on Levaquin and doxycycline  Procalcitonin negative x2  Antibiotics have been discontinued     Currently on Eliquis given elevated Ddimer   Follow-up Venous duplex of bilateral lower extremity   Home desaturation screen - qualified for oxygen 2L NC rest, 6L NC exercise   Unable to discharge today given complaints of hemoptysis, pulmonary on board   Trend inflammatory markers q48hrs  Lab Results   Component Value Date/Time    DDIMER 1 08 (H) 04/19/2021 06:02 AM    DDIMER 2 05 (H) 04/17/2021 05:10 AM    DDIMER 0 92 (H) 04/16/2021 04:58 AM    DDIMER 3,643 (H) 02/14/2019 05:52 AM    CRP 70 1 (H) 04/19/2021 06:02 AM    CRP 83 3 (H) 04/17/2021 05:10 AM    CRP 98 6 (H) 04/16/2021 04:58 AM    FERRITIN 528 (H) 04/19/2021 04:08 AM    FERRITIN 308 04/16/2021 04:58 AM    FERRITIN 333 04/07/2021 03:00 AM

## 2021-04-19 NOTE — ASSESSMENT & PLAN NOTE
Acute hypoxic respiratory failure, poa, a/e/b hypoxia 86% RA, sob, tachypnea 24 secondary to COVID 19 pneumonia  Initially requiring 6LNC but weaned down to 2L NC  Desaturation screen (4/19) - he qualified for oxygen 2L NC at rest, 6L NC during exertion  Wean oxygen as tolerated  Encourage proning, incentive spirometry

## 2021-04-19 NOTE — PROGRESS NOTES
[place]  Progress Note - Navjot Bajwa 1945, 77 y o  male MRN: [id number]  Unit/Bed#: -01 Encounter: [id number]  Primary Care Provider: Jono Reddy DO   Date and time admitted to hospital: 4/15/2021  5:43 AM    * Pneumonia due to COVID-19 virus  Assessment & Plan  Pneumonia due to COVID infection, poa, a/e/b sob, tachypnea, hypoxia 86% RA > 6L NC  Diagnosed with COVID19 on 4/01/2021  Continue with supplemental oxygen, Wean as tolerated  CXR revealed " Mild progression of bilateral groundglass opacities "  Chest CT scan revealed "No pulmonary embolus  Extensive new bilateral groundglass opacity and septal thickening, likely a combination of Covid-19 pneumonia and edema  Redemonstration of juxtapleural nodules  The previous parenchymal nodules are not well demonstrated due to the new groundglass opacity  Persistent mediastinal lymphadenopathy  These abnormalities should be reevaluated in 3 months, after clearing of acute infection   "   · Cardiac markers: troponin, CK- 24, BNP-[id number]   On Vitamin D3 2000 IU daily, vitamin C 1g PO BID, zinc 220mg PO daily    Continue on dexamethasone and Pepcid   Patient recently completed the course of remdesivir   Does not qualify for convalescent plasma therapy as symptoms > than 5 days   Encourage Self proning    Initiallystarted on Levaquin and doxycycline  Procalcitonin negative x2  Antibiotics have been discontinued     Currently on Eliquis given elevated Ddimer   Follow-up Venous duplex of bilateral lower extremity   Home desaturation screen - qualified for oxygen 2L NC rest, 6L NC exercise   Unable to discharge today given complaints of hemoptysis, pulmonary on board   Trend inflammatory markers q48hrs  Lab Results   Component Value Date/Time    DDIMER 1 08 (H) 04/19/2021 06:02 AM    DDIMER 2 05 (H) 04/17/2021 05:10 AM    DDIMER 0 92 (H) 04/16/2021 04:58 AM    DDIMER 9,514 (H) 02/14/2019 05:52 AM    CRP 70 1 (H) 04/19/2021 06:02 AM    CRP 83 3 (H) 04/17/2021 05:10 AM    CRP 98 6 (H) 04/16/2021 04:58 AM    FERRITIN 528 (H) 04/19/2021 04:08 AM    FERRITIN 308 04/16/2021 04:58 AM    FERRITIN 333 04/07/2021 03:00 AM       Hypokalemia  Assessment & Plan  Resolved  Monitor and replace electrolytes as needed    Severe sepsis (HCC)  Assessment & Plan  Severe sepsis, poa, a/e/b fever, tachycardia, tachypnea, leukocytosis and lactate of 3 6 secondary to COVID pneumonia  Management highlighted above    Gastroesophageal reflux disease with esophagitis  Assessment & Plan  On Pepcid    Acute respiratory failure with hypoxia (HCC)  Assessment & Plan  Acute hypoxic respiratory failure, poa, a/e/b hypoxia 86% RA, sob, tachypnea 24 secondary to COVID 19 pneumonia  Initially requiring 6LNC but weaned down to 2L NC  Desaturation screen (4/19) - he qualified for oxygen 2L NC at rest, 6L NC during exertion  Wean oxygen as tolerated  Encourage proning, incentive spirometry    Essential hypertension  Assessment & Plan  Home meds; Lasix, Toprol-XL and Zestril at home  Currently on Toprol-XL and Lasix  Zestril on hold  Monitor vitals as per protocol    Lymphoma [facility])  Assessment & Plan  History of small cell lymphocytic lymphoma  Diagnosed with lymphoma in 1996 and was treated at [facility]  In remission  Follows up with the oncologist at [facility]  Patient is scheduled to have PET scan on April 30th as outpatient    Dyslipidemia  Assessment & Plan  On statin    Type 2 diabetes mellitus without complication, without long-term current use of insulin [facility])  Assessment & Plan  Lab Results   Component Value Date    HGBA1C 5 8 (H) 12/30/2020       Recent Labs     04/18/21  1525 04/18/21  2153 04/19/21  0710 04/19/21  1108   POCGLU 319* 226* 140 261*       Blood Sugar Average: Last 72 hrs:  (P) 233 [id number] hold metformin  Accu-Chek a c  HS with Humalog sliding scale        VTE Pharmacologic Prophylaxis:   Pharmacologic: Apixaban (Eliquis)  Mechanical VTE Prophylaxis in Place: Yes    Patient Centered Rounds: I have performed bedside rounds with nursing staff today  Discussions with Specialists or Other Care Team Provider: CM, pulm    Education and Discussions with Family / Patient: patient's wife Jeremie Rosen    Time Spent for Care: 30 minutes  More than 50% of total time spent on counseling and coordination of care as described above  Current Length of Stay: 4 day(s)    Current Patient Status: Inpatient   Certification Statement: The patient will continue to require additional inpatient hospital stay due to as above    Discharge Plan: 1-2 days    Code Status: Level 1 - Full Code      Subjective:   No overnight events, SOB improving, c/o hemoptysis    Objective:     Vitals:   Temp (24hrs), Av 6 °F (36 4 °C), Min:97 6 °F (36 4 °C), Max:97 6 °F (36 4 °C)    Temp:  [97 6 °F (36 4 °C)] 97 6 °F (36 4 °C)  HR:  [54-75] 75  Resp:  [18-20] 18  BP: (138-150)/(58-70) 150/69  SpO2:  [91 %-97 %] 91 %  Body mass index is 22 29 kg/m²  Input and Output Summary (last 24 hours): Intake/Output Summary (Last 24 hours) at 2021 1413  Last data filed at 2021 1231  Gross per 24 hour   Intake 222 ml   Output 2125 ml   Net -1903 ml       Physical Exam:     Physical Exam  Vitals signs and nursing note reviewed  Constitutional:       General: He is not in acute distress  Appearance: Normal appearance  He is ill-appearing (chronically ill looking)  He is not toxic-appearing or diaphoretic  Cardiovascular:      Rate and Rhythm: Normal rate and regular rhythm  Pulses: Normal pulses  Heart sounds: No murmur  Pulmonary:      Effort: Pulmonary effort is normal  No respiratory distress  Breath sounds: Normal breath sounds  No stridor  No wheezing, rhonchi or rales  Chest:      Chest wall: No tenderness  Abdominal:      General: Bowel sounds are normal  There is no distension  Palpations: Abdomen is soft  Tenderness: There is no abdominal tenderness  There is no right CVA tenderness, left CVA tenderness, guarding or rebound  Musculoskeletal: Normal range of motion  General: No swelling or deformity  Right lower leg: No edema  Left lower leg: No edema  Skin:     General: Skin is warm and dry  Capillary Refill: Capillary refill takes less than 2 seconds  Coloration: Skin is not jaundiced or pale  Findings: No bruising, erythema, lesion or rash  Neurological:      General: No focal deficit present  Mental Status: He is alert  Mental status is at baseline  Cranial Nerves: No cranial nerve deficit  Psychiatric:         Mood and Affect: Mood normal          Thought Content: Thought content normal          Additional Data:     Labs:    Results from last 7 days   Lab Units 04/19/21  0602 04/19/21  0408 04/18/21  0529   WBC Thousand/uL  --  9 65 12 94*   HEMOGLOBIN g/dL 8 9* 6 3* 9 4*   HEMATOCRIT % 28 8* 21 3* 31 7*   BANDS PCT %  --  1  --    NEUTROS PCT %  --   --  87*   LYMPHS PCT %  --   --  5*   LYMPHO PCT %  --  5*  --    MONOS PCT %  --   --  7   MONO PCT %  --  5  --    EOS PCT %  --  0 0     Results from last 7 days   Lab Units 04/19/21  0602  04/16/21  0458   SODIUM mmol/L 138   < > 138   POTASSIUM mmol/L 4 0   < > 4 2   CHLORIDE mmol/L 100   < > 99*   CO2 mmol/L 29   < > 34*   BUN mg/dL 19   < > 20   CREATININE mg/dL 0 85   < > 0 97   ANION GAP mmol/L 9   < > 5   CALCIUM mg/dL 8 4   < > 8 6   ALBUMIN g/dL  --   --  2 4*   TOTAL BILIRUBIN mg/dL  --   --  0 90   ALK PHOS U/L  --   --  98   ALT U/L  --   --  20   AST U/L  --   --  21   GLUCOSE RANDOM mg/dL 143*   < > 166*    < > = values in this interval not displayed       Results from last 7 days   Lab Units 04/16/21  0458   INR  1 27*     Results from last 7 days   Lab Units 04/19/21  1108 04/19/21  0710 04/18/21  2153 04/18/21  1525 04/18/21  1102 04/18/21  0715 04/17/21  2114 04/17/21  1526 04/17/21  1041 04/17/21  0657 04/16/21  2115 04/16/21  1736   POC GLUCOSE mg/dl 261* 140 226* 319* 329* 157* 236* 261* 289* 107 233* 302*         Results from last 7 days   Lab Units 04/16/21  0458 04/15/21  0815 04/15/21  0613 04/15/21  0600   LACTIC ACID mmol/L  --  1 2 3 6*  --    PROCALCITONIN ng/ml <0 05  --   --  <0 05           * I Have Reviewed All Lab Data Listed Above  * Additional Pertinent Lab Tests Reviewed: All Labs Within Last 24 Hours Reviewed    Imaging:    Imaging Reports Reviewed Today Include:   Imaging Personally Reviewed by Myself Includes:      Recent Cultures (last 7 days):     Results from last 7 days   Lab Units 04/15/21  0600   BLOOD CULTURE  No Growth After 4 Days  No Growth After 4 Days         Last 24 Hours Medication List:   Current Facility-Administered Medications   Medication Dose Route Frequency Provider Last Rate    acetaminophen  650 mg Oral Q6H PRN Meghan Finch MD      albuterol  2 puff Inhalation Q4H PRN Meghan Finch MD      apixaban  2 5 mg Oral BID Meghan Finch MD      ascorbic acid  1,000 mg Oral Q12H Kaylnee Rice MD      aspirin  81 mg Oral Daily Meghan Finch MD      atorvastatin  40 mg Oral Daily Meghan Finch MD      cholecalciferol  2,000 Units Oral Daily Meghan Finch MD      dexamethasone  6 mg Intravenous Q24H Meghan Finch MD      famotidine  20 mg Oral BID Meghan Finch MD      ferrous sulfate  325 mg Oral Daily With Radha Snowden MD      furosemide  20 mg Oral BID Meghan Finch MD      insulin lispro  2-12 Units Subcutaneous TID AC Meghan Finch MD      mesalamine  4 g Rectal HS Meghan Finch MD      metoprolol succinate  100 mg Oral Daily Meghan Finch MD      zinc sulfate  220 mg Oral Daily Meghan Finch MD      Followed by   Brett Munoz ON 4/22/2021] multivitamin-minerals  1 tablet Oral Daily Meghan Finch MD      ondansetron  4 mg Intravenous Q6H PRN Meghan Finch MD     [facility] saccharomyces boulardii  250 mg Oral BID Angela Melara MD          Today, Patient Was Seen By: Janine Green MD    ** Please Note: Dictation voice to text software may have been used in the creation of this document   **

## 2021-04-19 NOTE — PROGRESS NOTES
Progress Note - Pulmonary   [place] Pay 77 y o  male MRN: [id number]  Unit/Bed#: -01 Encounter: [id number]      Assessment:  1  Acute hypoxic respiratory failure  2  COVID19 pneumonia  3  Blood tinged sputum - likely secondary to Eliquis  4  Severe sepsis secondary to COVID19    Plan:  1  For now I would monitor blood tinged sputum  While ideally I would consider holding Eliquis, I think the risk of holding it would be higher given thrombosis risk with COVID    2  If he continues to have bleeding, I would hold his anticoagulation  3  I do not think bronchoscopy is needed at this time as there is no significant amount of blood  4  Trial ocean nasal spray  5  Would try nebulizers to help with pulmonary clearance as well  6  Continue decadron  7  Continue vitamins as per protocol    Subjective:   Patient seen and examined  He states that his breathing is better but he still has episodes of hemoptysis   Objective:   Vitals: Blood pressure 150/69, pulse 75, temperature 97 6 °F (36 4 °C), resp  rate 18, height 5' 11" (1 803 m), weight 72 5 kg (159 lb 12 8 oz), SpO2 91 % , RA, Body mass index is 22 29 kg/m²  Intake/Output Summary (Last 24 hours) at 4/19/2021 1439  Last data filed at 4/19/2021 1231  Gross per 24 hour   Intake 222 ml   Output 2125 ml   Net -1903 ml         Physical Exam  Gen: Awake, alert, oriented x 3, no acute distress  HEENT: Mucous membranes moist, no oral lesions, no thrush  NECK: No accessory muscle use, JVP not elevated  Cardiac: Regular, single S1, single S2, no murmurs, no rubs, no gallops  Lungs: Scattered rhonchi, no wheezing or rales  Abdomen: normoactive bowel sounds, soft nontender, nondistended, no rebound or rigidity, no guarding  Extremities: no cyanosis, no clubbing, no edema    Labs: I have personally reviewed pertinent lab results    Results from last 7 days   Lab Units 04/19/21  0602 04/19/21  0408 04/18/21  0529 04/17/21  0510 04/16/21  0458   WBC Thousand/uL  --  9 65 12 94* 16 46* 14 44*   HEMOGLOBIN g/dL 8 9* 6 3* 9 4* 9 9* 10 1*   HEMATOCRIT % 28 8* 21 3* 31 7* 32 6* 34 0*   NEUTROS PCT %  --   --  87* 86* 87*   MONOS PCT %  --   --  7 7 8   MONO PCT %  --  5  --   --   --       Results from last 7 days   Lab Units 04/19/21  0602 04/18/21  0529 04/17/21  0510 04/16/21  0458 04/15/21  0600   POTASSIUM mmol/L 4 0 3 9 3 9 4 2 3 0*   CHLORIDE mmol/L 100 99* 98* 99* 96*   CO2 mmol/L 29 26 30 34* 31   BUN mg/dL 19 18 18 20 19   CREATININE mg/dL 0 85 0 84 0 98 0 97 1 11   CALCIUM mg/dL 8 4 8 5 8 7 8 6 8 7   ALK PHOS U/L  --   --   --  98 124*   ALT U/L  --   --   --  20 22   AST U/L  --   --   --  21 15     Results from last 7 days   Lab Units 04/18/21  0529 04/16/21  0458   MAGNESIUM mg/dL 2 2 2 2     Results from last 7 days   Lab Units 04/16/21  0458   PHOSPHORUS mg/dL 2 9      Results from last 7 days   Lab Units 04/16/21  0458 04/15/21  0600   INR  1 27* 1 21*   PTT seconds  --  27     Results from last 7 days   Lab Units 04/15/21  0815   LACTIC ACID mmol/L 1 2     0   Lab Value Date/Time    TROPONINI 0 05 (H) 04/15/2021 0600    TROPONINI 0 04 04/06/2021 1859    TROPONINI 0 05 (H) 04/06/2021 1537    TROPONINI 0 05 (H) 04/06/2021 1248    TROPONINI 0 04 04/06/2021 0645    TROPONINI <0 02 09/12/2019 0234    TROPONINI <0 02 09/11/2019 2335    TROPONINI <0 02 09/11/2019 2026    TROPONINI <0 02 08/20/2019 1750    TROPONINI <0 02 07/25/2019 2110    TROPONINI 0 02 07/25/2019 1807    TROPONINI <0 02 02/27/2019 1424    TROPONINI <0 02 02/14/2019 1615    TROPONINI <0 02 02/14/2019 1123    TROPONINI <0 02 02/14/2019 0552         Meds/Allergies   Current Facility-Administered Medications   Medication Dose Route Frequency    acetaminophen (TYLENOL) tablet 650 mg  650 mg Oral Q6H PRN    albuterol (PROVENTIL HFA,VENTOLIN HFA) inhaler 2 puff  2 puff Inhalation Q4H PRN    apixaban (ELIQUIS) tablet 2 5 mg  2 5 mg Oral BID    ascorbic acid (VITAMIN C) tablet 1,000 mg 1,000 mg Oral Q12H [address]    aspirin (ECOTRIN LOW STRENGTH) EC tablet 81 mg  81 mg Oral Daily    atorvastatin (LIPITOR) tablet 40 mg  40 mg Oral Daily    cholecalciferol (VITAMIN D3) tablet 2,000 Units  2,000 Units Oral Daily    dexamethasone (DECADRON) injection 6 mg  6 mg Intravenous Q24H    famotidine (PEPCID) tablet 20 mg  20 mg Oral BID    ferrous sulfate tablet 325 mg  325 mg Oral Daily With Breakfast    furosemide (LASIX) tablet 20 mg  20 mg Oral BID    insulin lispro (HumaLOG) 100 units/mL subcutaneous injection 2-12 Units  2-12 Units Subcutaneous TID AC    mesalamine (ROWASA) enema 4 g  4 g Rectal HS    metoprolol succinate (TOPROL-XL) 24 hr tablet 100 mg  100 mg Oral Daily    zinc sulfate (ZINCATE) capsule 220 mg  220 mg Oral Daily    Followed by   Monica Hernández ON 4/22/2021] multivitamin-minerals (CENTRUM ADULTS) tablet 1 tablet  1 tablet Oral Daily    ondansetron (ZOFRAN) injection 4 mg  4 mg Intravenous Q6H PRN    saccharomyces boulardii (FLORASTOR) capsule 250 mg  250 mg Oral BID     Medications Prior to Admission   Medication    aspirin 81 MG tablet    atorvastatin (LIPITOR) 40 mg tablet    cholecalciferol (VITAMIN D3) 1,000 units tablet    diphenoxylate-atropine (LOMOTIL) 2 5-0 025 mg per tablet    ferrous sulfate 324 MG TBEC    furosemide (LASIX) 20 mg tablet    lisinopril (ZESTRIL) 20 mg tablet    mesalamine (ROWASA) 4 g    metFORMIN (GLUCOPHAGE-XR) 500 mg 24 hr tablet    metoprolol succinate (TOPROL-XL) 100 mg 24 hr tablet    multivitamin-minerals (CENTRUM ADULTS) tablet    nitroglycerin (NITROSTAT) 0 3 mg SL tablet    omeprazole (PriLOSEC) 40 MG capsule    predniSONE 10 mg tablet    saccharomyces boulardii (FLORASTOR) 250 mg capsule         Microbiology:  Lab Results   Component Value Date    BLOODCX No Growth After 4 Days  04/15/2021    BLOODCX No Growth After 4 Days  04/15/2021    BLOODCX No Growth After 5 Days  04/06/2021    BLOODCX No Growth After 5 Days   04/06/2021 Imaging and other studies: I have personally reviewed pertinent reports  US lower extremities - negative for DVT        DO Ines Azar [facility]

## 2021-04-19 NOTE — RESPIRATORY THERAPY NOTE
Home Oxygen Qualifying Test       Patient name: Evalee Kawasaki        : 1945   Date of Test:  2021  Diagnosis:      Home Oxygen Test:    Medicare Guidelines require item(s) 1-5 on all ambulatory patients or 1 and 2 on non-ambulatory patients  1   Baseline SPO2 on Room Air at rest 85 %  2   SPO2 during exercise on Room Air  %  During exercise monitor SpO2  If SPO2 increases >=89% with ambulation do not add supplemental             oxygen  If <= 88% on room air add O2 via NC and titrate patient  Patient must be ambulated with O2 and titrated to > 88% with exertion  3   SPO2 on Oxygen at rest 93 % 2 lpm     4   SPO2 during exercise on Oxygen  90-93% a liter flow of 6 lpm     5   Exercise performed:          walking          [x]  Supplemental Home Oxygen is indicated  []  Client does not qualify for home oxygen        Respiratory Additional Notes-     Lena Reid, RT

## 2021-04-19 NOTE — ASSESSMENT & PLAN NOTE
Lab Results   Component Value Date    HGBA1C 5 8 (H) 12/30/2020       Recent Labs     04/18/21  1525 04/18/21  2153 04/19/21  0710 04/19/21  1108   POCGLU 319* 226* 140 261*       Blood Sugar Average: Last 72 hrs:  (P) 233 [id number] hold metformin  Accu-Chek a c  HS with Humalog sliding scale

## 2021-04-19 NOTE — PHYSICAL THERAPY NOTE
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

## 2021-04-19 NOTE — OCCUPATIONAL THERAPY NOTE
OCCUPATIONAL THERAPY SCREEN        OT orders received and chart reviewed  Per RN, pt has been (I)'ly ambulating in room and taking care of his ADLS without any issues  No skilled OT needs at this time  DC orders       Ana Coy MS, OTR/L

## 2021-04-20 ENCOUNTER — TRANSITIONAL CARE MANAGEMENT (OUTPATIENT)
Dept: FAMILY MEDICINE CLINIC | Facility: HOSPITAL | Age: 76
End: 2021-04-20

## 2021-04-20 VITALS
HEIGHT: 71 IN | HEART RATE: 65 BPM | WEIGHT: 161.38 LBS | RESPIRATION RATE: 18 BRPM | SYSTOLIC BLOOD PRESSURE: 167 MMHG | OXYGEN SATURATION: 95 % | BODY MASS INDEX: 22.59 KG/M2 | TEMPERATURE: 97 F | DIASTOLIC BLOOD PRESSURE: 82 MMHG

## 2021-04-20 LAB
ANION GAP SERPL CALCULATED.3IONS-SCNC: 11 MMOL/L (ref 4–13)
BACTERIA BLD CULT: NORMAL
BACTERIA BLD CULT: NORMAL
BUN SERPL-MCNC: 22 MG/DL (ref 5–25)
CALCIUM SERPL-MCNC: 8.3 MG/DL (ref 8.3–10.1)
CHLORIDE SERPL-SCNC: 100 MMOL/L (ref 100–108)
CO2 SERPL-SCNC: 28 MMOL/L (ref 21–32)
CREAT SERPL-MCNC: 0.88 MG/DL (ref 0.6–1.3)
DME PARACHUTE DELIVERY DATE ACTUAL: NORMAL
DME PARACHUTE DELIVERY DATE EXPECTED: NORMAL
DME PARACHUTE DELIVERY DATE REQUESTED: NORMAL
DME PARACHUTE ITEM DESCRIPTION: NORMAL
DME PARACHUTE ORDER STATUS: NORMAL
DME PARACHUTE SUPPLIER NAME: NORMAL
DME PARACHUTE SUPPLIER PHONE: NORMAL
ERYTHROCYTE [DISTWIDTH] IN BLOOD BY AUTOMATED COUNT: 18.8 % (ref 11.6–15.1)
GFR SERPL CREATININE-BSD FRML MDRD: 85 ML/MIN/1.73SQ M
GLUCOSE SERPL-MCNC: 171 MG/DL (ref 65–140)
GLUCOSE SERPL-MCNC: 231 MG/DL (ref 65–140)
GLUCOSE SERPL-MCNC: 308 MG/DL (ref 65–140)
HCT VFR BLD AUTO: 26.7 % (ref 36.5–49.3)
HGB BLD-MCNC: 8 G/DL (ref 12–17)
MCH RBC QN AUTO: 23.7 PG (ref 26.8–34.3)
MCHC RBC AUTO-ENTMCNC: 30 G/DL (ref 31.4–37.4)
MCV RBC AUTO: 79 FL (ref 82–98)
PLATELET # BLD AUTO: 195 THOUSANDS/UL (ref 149–390)
POTASSIUM SERPL-SCNC: 3.9 MMOL/L (ref 3.5–5.3)
RBC # BLD AUTO: 3.38 MILLION/UL (ref 3.88–5.62)
SODIUM SERPL-SCNC: 139 MMOL/L (ref 136–145)
WBC # BLD AUTO: 8.37 THOUSAND/UL (ref 4.31–10.16)

## 2021-04-20 PROCEDURE — 99239 HOSP IP/OBS DSCHRG MGMT >30: CPT | Performed by: INTERNAL MEDICINE

## 2021-04-20 PROCEDURE — 80048 BASIC METABOLIC PNL TOTAL CA: CPT | Performed by: INTERNAL MEDICINE

## 2021-04-20 PROCEDURE — 99232 SBSQ HOSP IP/OBS MODERATE 35: CPT | Performed by: INTERNAL MEDICINE

## 2021-04-20 PROCEDURE — 82948 REAGENT STRIP/BLOOD GLUCOSE: CPT

## 2021-04-20 PROCEDURE — 85027 COMPLETE CBC AUTOMATED: CPT | Performed by: INTERNAL MEDICINE

## 2021-04-20 RX ORDER — DEXAMETHASONE 6 MG/1
6 TABLET ORAL
Qty: 5 TABLET | Refills: 0 | Status: SHIPPED | OUTPATIENT
Start: 2021-04-20 | End: 2021-04-25

## 2021-04-20 RX ORDER — ECHINACEA PURPUREA EXTRACT 125 MG
1 TABLET ORAL
Status: DISCONTINUED | OUTPATIENT
Start: 2021-04-20 | End: 2021-04-20 | Stop reason: HOSPADM

## 2021-04-20 RX ADMIN — INSULIN LISPRO 2 UNITS: 100 INJECTION, SOLUTION INTRAVENOUS; SUBCUTANEOUS at 08:13

## 2021-04-20 RX ADMIN — FAMOTIDINE 20 MG: 20 TABLET ORAL at 08:15

## 2021-04-20 RX ADMIN — ATORVASTATIN CALCIUM 40 MG: 40 TABLET, FILM COATED ORAL at 08:16

## 2021-04-20 RX ADMIN — ASPIRIN 81 MG: 81 TABLET, COATED ORAL at 08:15

## 2021-04-20 RX ADMIN — Medication 250 MG: at 08:14

## 2021-04-20 RX ADMIN — INSULIN LISPRO 8 UNITS: 100 INJECTION, SOLUTION INTRAVENOUS; SUBCUTANEOUS at 12:44

## 2021-04-20 RX ADMIN — Medication 2000 UNITS: at 08:14

## 2021-04-20 RX ADMIN — OXYCODONE HYDROCHLORIDE AND ACETAMINOPHEN 1000 MG: 500 TABLET ORAL at 08:14

## 2021-04-20 RX ADMIN — ZINC SULFATE 220 MG (50 MG) CAPSULE 220 MG: CAPSULE at 08:16

## 2021-04-20 RX ADMIN — DEXAMETHASONE SODIUM PHOSPHATE 6 MG: 4 INJECTION, SOLUTION INTRA-ARTICULAR; INTRALESIONAL; INTRAMUSCULAR; INTRAVENOUS; SOFT TISSUE at 08:17

## 2021-04-20 RX ADMIN — FERROUS SULFATE TAB 325 MG (65 MG ELEMENTAL FE) 325 MG: 325 (65 FE) TAB at 08:14

## 2021-04-20 RX ADMIN — METOPROLOL SUCCINATE 100 MG: 50 TABLET, EXTENDED RELEASE ORAL at 08:15

## 2021-04-20 RX ADMIN — FUROSEMIDE 20 MG: 20 TABLET ORAL at 08:17

## 2021-04-20 NOTE — PLAN OF CARE
Problem: Potential for Falls  Goal: Patient will remain free of falls  Description: INTERVENTIONS:  - Assess patient frequently for physical needs  -  Identify cognitive and physical deficits and behaviors that affect risk of falls    -  [place] fall precautions as indicated by assessment   - Educate patient/family on patient safety including physical limitations  - Instruct patient to call for assistance with activity based on assessment  - Modify environment to reduce risk of injury  - Consider OT/PT consult to assist with strengthening/mobility  Outcome: Progressing     Problem: Prexisting or High Potential for Compromised Skin Integrity  Goal: Skin integrity is maintained or improved  Description: INTERVENTIONS:  - Identify patients at risk for skin breakdown  - Assess and monitor skin integrity  - Assess and monitor nutrition and hydration status  - Monitor labs   - Assess for incontinence   - Turn and reposition patient  - Assist with mobility/ambulation  - Relieve pressure over bony prominences  - Avoid friction and shearing  - Provide appropriate hygiene as needed including keeping skin clean and dry  - Evaluate need for skin moisturizer/barrier cream  - Collaborate with interdisciplinary team   - Patient/family teaching  - Consider wound care consult   Outcome: Progressing     Problem: PAIN - ADULT  Goal: Verbalizes/displays adequate comfort level or baseline comfort level  Description: Interventions:  - Encourage patient to monitor pain and request assistance  - Assess pain using appropriate pain scale  - Administer analgesics based on type and severity of pain and evaluate response  - Implement non-pharmacological measures as appropriate and evaluate response  - Consider cultural and social influences on pain and pain management  - Notify physician/advanced practitioner if interventions unsuccessful or patient reports new pain  Outcome: Progressing     Problem: INFECTION - ADULT  Goal: Absence or prevention of progression during hospitalization  Description: INTERVENTIONS:  - Assess and monitor for signs and symptoms of infection  - Monitor lab/diagnostic results  - Monitor all insertion sites, i e  indwelling lines, tubes, and drains  - Monitor endotracheal if appropriate and nasal secretions for changes in amount and color  - [place] appropriate cooling/warming therapies per order  - Administer medications as ordered  - Instruct and encourage patient and family to use good hand hygiene technique  - Identify and instruct in appropriate isolation precautions for identified infection/condition  Outcome: Progressing  Goal: Absence of fever/infection during neutropenic period  Description: INTERVENTIONS:  - Monitor WBC    Outcome: Progressing     Problem: SAFETY ADULT  Goal: Patient will remain free of falls  Description: INTERVENTIONS:  - Assess patient frequently for physical needs  -  Identify cognitive and physical deficits and behaviors that affect risk of falls    -  [place] fall precautions as indicated by assessment   - Educate patient/family on patient safety including physical limitations  - Instruct patient to call for assistance with activity based on assessment  - Modify environment to reduce risk of injury  - Consider OT/PT consult to assist with strengthening/mobility  Outcome: Progressing  Goal: Maintain or return to baseline ADL function  Description: INTERVENTIONS:  -  Assess patient's ability to carry out ADLs; assess patient's baseline for ADL function and identify physical deficits which impact ability to perform ADLs (bathing, care of mouth/teeth, toileting, grooming, dressing, etc )  - Assess/evaluate cause of self-care deficits   - Assess range of motion  - Assess patient's mobility; develop plan if impaired  - Assess patient's need for assistive devices and provide as appropriate  - Encourage maximum independence but intervene and supervise when necessary  - Involve family in performance of ADLs  - Assess for home care needs following discharge   - Consider OT consult to assist with ADL evaluation and planning for discharge  - Provide patient education as appropriate  Outcome: Progressing  Goal: Maintain or return mobility status to optimal level  Description: INTERVENTIONS:  - Assess patient's baseline mobility status (ambulation, transfers, stairs, etc )    - Identify cognitive and physical deficits and behaviors that affect mobility  - Identify mobility aids required to assist with transfers and/or ambulation (gait belt, sit-to-stand, lift, walker, cane, etc )  - [place] fall precautions as indicated by assessment  - Record patient progress and toleration of activity level on Mobility SBAR; progress patient to next Phase/Stage  - Instruct patient to call for assistance with activity based on assessment  - Consider rehabilitation consult to assist with strengthening/weightbearing, etc   Outcome: Progressing     Problem: DISCHARGE PLANNING  Goal: Discharge to home or other facility with appropriate resources  Description: INTERVENTIONS:  - Identify barriers to discharge w/patient and caregiver  - Arrange for needed discharge resources and transportation as appropriate  - Identify discharge learning needs (meds, wound care, etc )  - Arrange for interpretive services to assist at discharge as needed  - Refer to Case Management Department for coordinating discharge planning if the patient needs post-hospital services based on physician/advanced practitioner order or complex needs related to functional status, cognitive ability, or social support system  Outcome: Progressing     Problem: Knowledge Deficit  Goal: Patient/family/caregiver demonstrates understanding of disease process, treatment plan, medications, and discharge instructions  Description: Complete learning assessment and assess knowledge base    Interventions:  - Provide teaching at level of understanding  - Provide teaching via preferred learning methods  Outcome: Progressing

## 2021-04-20 NOTE — PROGRESS NOTES
Pastoral Care Progress Note    2021  Patient: Herminia Roca : 1945  Admission Date & Time: 4/15/2021 0543  MRN: [id number] [facility]: [id number]                     Chaplaincy Interventions Utilized:   Relationship Building: Cultivated a relationship of care and support  Made introduction to patient  Patient accepted 's offer of a prayer  Patient indicated that he is Bambi Campbell  Ritual: Provided prayer   Chaplaincy Outcomes Achieved:  Expressed gratitude    Patient grateful for visit and prayer         21 1100   Clinical Encounter Type   Visited With Patient   Routine Visit Introduction   Referral To   (patient daily census)   Lutheran Encounters   Lutheran Needs Prayer

## 2021-04-20 NOTE — DISCHARGE INSTR - AVS FIRST PAGE
Dear Sarahy Villela,     It was our pleasure to care for you here at [facility], [address] [place] [address]  It is our hope that we were always able to exceed the expected standards for your care during your stay  You were hospitalized due to progressively worsening shortness of breath due to covid pneumonia and you were treated with IV steroids and multivitamins  You were cared for on the general medical floor under the service of Norma Garcia MD with the [facility] Hospitalist Group who covers for your primary care physician (PCP), Tony Andrew DO, while you were hospitalized  If you have any questions or concerns related to this hospitalization, you may contact us at [phone]  For follow up as well as medication refills, we recommend that you follow up with your primary care physician  A registered nurse will reach out to you by phone within a few days after your discharge to answer any additional questions that you may have after going home  However, at this time we provide for you here, the most important instructions / recommendations at discharge:     · Notable Medication Adjustments -   · 2 5mg eliquis BID x 30days then stop (empiric coverage to prevent clot formation)  Stop aspirin while on eliquis  · 6mg decadron daily x 5days  · Important follow up information -   · Follow up closely with your primary care doctor, they would coordinate weaning you off oxygen  · Follow up with the pulmonary doctor  · Please review this entire after visit summary as additional general instructions including medication list, appointments, activity, diet, any pertinent wound care, and other additional recommendations from your care team that may be provided for you        Sincerely,     Norma Garcia MD

## 2021-04-20 NOTE — ASSESSMENT & PLAN NOTE
Lab Results   Component Value Date    HGBA1C 5 8 (H) 12/30/2020       Recent Labs     04/19/21  1108 04/19/21  1612 04/19/21  2108 04/20/21  0810   POCGLU 261* 322* 289* 171*       Blood Sugar Average: Last 72 hrs:  (P) 239 hold metformin  Accu-Chek a c  HS with Humalog sliding scale

## 2021-04-20 NOTE — PROGRESS NOTES
Progress Note - Pulmonary   Trey Kelly 77 y o  male MRN: [id number]  Unit/Bed#: -01 Encounter: [id number]      Interval History:   Patient shortness of breath is improved since this weekend  No fevers or chills  Still having scant hemoptysis however this is improved over the last couple of days as well  No fevers or chills  Review of Systems:  Full 12 point review of systems negative other than previously mentioned    Objective:   Vitals: Blood pressure 167/82, pulse 65, temperature (!) 97 °F (36 1 °C), resp  rate 18, height 5' 11" (1 803 m), weight 73 2 kg (161 lb 6 oz), SpO2 95 %  , Body mass index is 22 51 kg/m²  Physical Exam  No acute distress  S1-S2  Diminished but otherwise clear to auscultation bilaterally  normoactive bowel sounds, soft nontender, nondistended, no rebound or rigidity, no guarding  no cyanosis, no clubbing, no edema    Labs: I have personally reviewed pertinent lab results  Results Reviewed     Procedure Component Value Units Date/Time    Blood culture [[id number]] Collected: 04/15/21 0600    Lab Status: Final result Specimen: Blood from Arm, Right Updated: 04/20/21 1001     Blood Culture No Growth After 5 Days  Blood culture [[id number]] Collected: 04/15/21 0600    Lab Status: Final result Specimen: Blood from Arm, Right Updated: 04/20/21 1001     Blood Culture No Growth After 5 Days  Procalcitonin with AM Reflex [[id number]]  (Normal) Collected: 04/15/21 0600    Lab Status: Final result Specimen: Blood from Arm, Right Updated: 04/15/21 1139     Procalcitonin <0 05 ng/ml     Lactic acid 2 Hours [[id number]]  (Normal) Collected: 04/15/21 0815    Lab Status: Final result Specimen: Blood from Arm, Right Updated: 04/15/21 0846     LACTIC ACID 1 2 mmol/L     Narrative:      Result may be elevated if tourniquet was used during collection      Lactic acid, plasma [[id number]]  (Abnormal) Collected: 04/15/21 0613    Lab Status: Final result Specimen: Blood from Arm, Right Updated: 04/15/21 0710     LACTIC ACID 3 6 mmol/L     Narrative:      Result may be elevated if tourniquet was used during collection      CBC and differential [[id number]]  (Abnormal) Collected: 04/15/21 0601    Lab Status: Final result Specimen: Blood from Arm, Right Updated: 04/15/21 0701     WBC 14 15 Thousand/uL      RBC 4 40 Million/uL      Hemoglobin 10 6 g/dL      Hematocrit 34 9 %      MCV 79 fL      MCH 24 1 pg      MCHC 30 4 g/dL      RDW 20 5 %      MPV 11 4 fL      Platelets --     nRBC 0 /100 WBCs      Neutrophils Relative 86 %      Immat GRANS % 1 %      Lymphocytes Relative 4 %      Monocytes Relative 8 %      Eosinophils Relative 1 %      Basophils Relative 0 %      Neutrophils Absolute 12 21 Thousands/µL      Immature Grans Absolute 0 16 Thousand/uL      Lymphocytes Absolute 0 57 Thousands/µL      Monocytes Absolute 1 08 Thousand/µL      Eosinophils Absolute 0 11 Thousand/µL      Basophils Absolute 0 02 Thousands/µL     Narrative:       Previous Platelet Clumper    Urine Microscopic [[id number]]  (Normal) Collected: 04/15/21 0620    Lab Status: Final result Specimen: Urine, Clean Catch Updated: 04/15/21 0656     RBC, UA None Seen /hpf      WBC, UA 0-1 /hpf      Epithelial Cells Occasional /hpf      Bacteria, UA None Seen /hpf     Specimen draw for platelet clumping [[id number]]  (Normal) Collected: 04/15/21 0640    Lab Status: Final result Specimen: Arm, Right Updated: 04/15/21 0654     Citrated Platelets 767 Thousands/uL     Comprehensive metabolic panel [[id number]]  (Abnormal) Collected: 04/15/21 0600    Lab Status: Final result Specimen: Blood from Arm, Right Updated: 04/15/21 0648     Sodium 138 mmol/L      Potassium 3 0 mmol/L      Chloride 96 mmol/L      CO2 31 mmol/L      ANION GAP 11 mmol/L      BUN 19 mg/dL      Creatinine 1 11 mg/dL      Glucose 227 mg/dL      Calcium 8 7 mg/dL      Corrected Calcium 9 7 mg/dL      AST 15 U/L      ALT 22 U/L      Alkaline Phosphatase 124 U/L      Total Protein 6 3 g/dL      Albumin 2 8 g/dL      Total Bilirubin 1 00 mg/dL      eGFR 65 ml/min/1 73sq m     Narrative:      [place] guidelines for Chronic Kidney Disease (CKD):     Stage 1 with normal or high GFR (GFR > 90 mL/min/1 73 square meters)    Stage 2 Mild CKD (GFR = 60-89 mL/min/1 73 square meters)    Stage 3A Moderate CKD (GFR = 45-59 mL/min/1 73 square meters)    Stage 3B Moderate CKD (GFR = 30-44 mL/min/1 73 square meters)    Stage 4 Severe CKD (GFR = 15-29 mL/min/1 73 square meters)    Stage 5 End Stage CKD (GFR <15 mL/min/1 73 square meters)  Note: GFR calculation is accurate only with a steady state creatinine    NT-BNP PRO [[id number]]  (Abnormal) Collected: 04/15/21 0600    Lab Status: Final result Specimen: Blood from Arm, Right Updated: 04/15/21 0648     NT-proBNP 7,995 pg/mL     C-reactive protein [[id number]]  (Abnormal) Collected: 04/15/21 0600    Lab Status: Final result Specimen: Blood from Arm, Right Updated: 04/15/21 0648     CRP 62 3 mg/L     CK (with reflex to MB) [[id number]]  (Abnormal) Collected: 04/15/21 0600    Lab Status: Final result Specimen: Blood from Arm, Right Updated: 04/15/21 0647     Total CK 24 U/L     Troponin I [[id number]]  (Abnormal) Collected: 04/15/21 0600    Lab Status: Final result Specimen: Blood from Arm, Right Updated: 04/15/21 0641     Troponin I 0 05 ng/mL     D-dimer, quantitative [[id number]]  (Abnormal) Collected: 04/15/21 0600    Lab Status: Final result Specimen: Blood from Arm, Right Updated: 04/15/21 0639     D-Dimer, Quant 1 53 ug/ml FEU     Protime-INR [[id number]]  (Abnormal) Collected: 04/15/21 0600    Lab Status: Final result Specimen: Blood from Arm, Right Updated: 04/15/21 0639     Protime 15 3 seconds      INR 1 21    APTT [[id number]]  (Normal) Collected: 04/15/21 0600    Lab Status: Final result Specimen: Blood from Arm, Right Updated: 04/15/21 0639     PTT 27 seconds     UA w Reflex to Microscopic w Reflex to Culture [[id number]]  (Abnormal) Collected: 04/15/21 0620    Lab Status: Final result Specimen: Urine, Clean Catch Updated: 04/15/21 3682     Color, UA Yellow     Clarity, UA Clear     Specific Gravity, UA 1 010     pH, UA 6 5     Leukocytes, UA Negative     Nitrite, UA Negative     Protein, UA 30 (1+) mg/dl      Glucose, UA Negative mg/dl      Ketones, UA Negative mg/dl      Urobilinogen, UA 0 2 E U /dl      Bilirubin, UA Negative     Blood, UA Negative           Current Medications:    Current Facility-Administered Medications:     acetaminophen (TYLENOL) tablet 650 mg, 650 mg, Oral, Q6H PRN, Sara Muñoz MD    albuterol (PROVENTIL HFA,VENTOLIN HFA) inhaler 2 puff, 2 puff, Inhalation, Q4H PRN, Sara Muñoz MD    apixaban (ELIQUIS) tablet 2 5 mg, 2 5 mg, Oral, BID, Sara Muñoz MD, 2 5 mg at 04/19/21 1711    ascorbic acid (VITAMIN C) tablet 1,000 mg, 1,000 mg, Oral, Q12H [facility], Sara Muñoz MD, 1,000 mg at 04/20/21 0814    aspirin (ECOTRIN LOW STRENGTH) EC tablet 81 mg, 81 mg, Oral, Daily, Sara Muñoz MD, 81 mg at 04/20/21 0815    atorvastatin (LIPITOR) tablet 40 mg, 40 mg, Oral, Daily, Sara Muñoz MD, 40 mg at 04/20/21 0816    cholecalciferol (VITAMIN D3) tablet 2,000 Units, 2,000 Units, Oral, Daily, Sara Muñoz MD, 2,000 Units at 04/20/21 0814    dexamethasone (DECADRON) injection 6 mg, 6 mg, Intravenous, Q24H, Sara Muñoz MD, 6 mg at 04/20/21 0817    famotidine (PEPCID) tablet 20 mg, 20 mg, Oral, BID, Sara Muñoz MD, 20 mg at 04/20/21 0815    ferrous sulfate tablet 325 mg, 325 mg, Oral, Daily With Breakfast, Sara Muñoz MD, 325 mg at 04/20/21 0814    furosemide (LASIX) tablet 20 mg, 20 mg, Oral, BID, Sara Muñoz MD, 20 mg at 04/20/21 0817    insulin lispro (HumaLOG) 100 units/mL subcutaneous injection 2-12 Units, 2-12 Units, Subcutaneous, TID AC, 2 Units at 04/20/21 0813 **AND** Fingerstick Glucose (POCT), , , TID AC, MD Seth Thomas mesalamine (ROWASA) enema 4 g, 4 g, Rectal, HS, Yvonne Aviles MD    metoprolol succinate (TOPROL-XL) 24 hr tablet 100 mg, 100 mg, Oral, Daily, Yvonne Aviles MD, 100 mg at 04/20/21 0815    zinc sulfate (ZINCATE) capsule 220 mg, 220 mg, Oral, Daily, 220 mg at 04/20/21 0816 **FOLLOWED BY** [START ON 4/22/2021] multivitamin-minerals (CENTRUM ADULTS) tablet 1 tablet, 1 tablet, Oral, Daily, Yvonne Aviles MD    ondansetron (ZOFRAN) injection 4 mg, 4 mg, Intravenous, Q6H PRN, Yvonne Aviles MD    saccharomyces boulardii (FLORASTOR) capsule 250 mg, 250 mg, Oral, BID, Yvonne Aviles MD, 250 mg at 04/20/21 0814    sodium chloride (OCEAN) 0 65 % nasal spray 1 spray, 1 spray, Each Nare, Q1H PRN, Cristian Roe MD       Assessment:  1  Acute hypoxic respiratory failure  2  COVID-19 pneumonia      Plan:   Continue monitor blood tinged sputum   Continue Eliquis   Benefit of Decadron is noted for up to 10 days or until patient is off supplemental oxygen at rest   Continue COVID19 mild pathway   I personally discussed this patient in depth with the primary service        LUISANA Ramirez [facility]

## 2021-04-20 NOTE — DISCHARGE SUMMARY
[place]  Discharge- Sebastian Innocent 1945, 77 y o  male MRN: [id number]  Unit/Bed#: -01 Encounter: [id number]  Primary Care Provider: Neil Bullock DO   Date and time admitted to hospital: 4/15/2021  5:43 AM    * Pneumonia due to COVID-19 virus  Assessment & Plan  Pneumonia due to COVID infection, poa, a/e/b sob, tachypnea, hypoxia 86% RA > 6L NC  Diagnosed with COVID19 on 4/01/2021  Continue with supplemental oxygen, Wean as tolerated  · CXR revealed " Mild progression of bilateral groundglass opacities "  · Chest CT scan revealed "No pulmonary embolus  Extensive new bilateral groundglass opacity and septal thickening, likely a combination of Covid-19 pneumonia and edema  · Cardiac markers: troponin, CK- 24, BNP-7995   On Vitamin D3 2000 IU daily, vitamin C 1g PO BID, zinc 220mg PO daily    Continue on dexamethasone and Pepcid   Patient recently completed the course of remdesivir   Does not qualify for convalescent plasma therapy as symptoms > than 5 days   Encourage Self proning    Initially started on Levaquin and doxycycline  Procalcitonin negative x2  Antibiotics have been discontinued     Currently on Eliquis given elevated Ddimer, venous doppler negative   Hemoptysis resolved, medically stable for discharge   Patient did not meet criteria for covid variant testing, 2nd dose of his vaccine was on the 29th and he tested positive on 04/01/21   Would Discharge patient on 30days of prophylactic eliquis improve score (3points), Ddimer > 1, CM to provide coupon   Would DC on 6mg decadron x 5days to complete 10day course   Home desaturation screen - qualified for oxygen 2L NC rest, 6L NC exercise     Trend inflammatory markers q48hrs  Lab Results   Component Value Date/Time    DDIMER 1 08 (H) 04/19/2021 06:02 AM    DDIMER 2 05 (H) 04/17/2021 05:10 AM    DDIMER 0 92 (H) 04/16/2021 04:58 AM    DDIMER 5,348 (H) 02/14/2019 05:52 AM    CRP 70 1 (H) 04/19/2021 06:02 AM    CRP 83 3 (H) 04/17/2021 05:10 AM    CRP 98 6 (H) 04/16/2021 04:58 AM    FERRITIN 528 (H) 04/19/2021 04:08 AM    FERRITIN 308 04/16/2021 04:58 AM    FERRITIN 333 04/07/2021 03:00 AM       Hypokalemia  Assessment & Plan  Resolved  Monitor and replace electrolytes as needed    Severe sepsis (HCC)  Assessment & Plan  Severe sepsis, poa, a/e/b fever, tachycardia, tachypnea, leukocytosis and lactate of 3 6 secondary to COVID pneumonia  Management highlighted above    Gastroesophageal reflux disease with esophagitis  Assessment & Plan  On Pepcid    Acute respiratory failure with hypoxia (HCC)  Assessment & Plan  Acute hypoxic respiratory failure, poa, a/e/b hypoxia 86% RA, sob, tachypnea 24 secondary to COVID 19 pneumonia  Initially requiring 6LNC but weaned down to 2L NC  Desaturation screen (4/19) - he qualified for oxygen 2L NC at rest, 6L NC during exertion  Wean oxygen as tolerated  Encourage proning, incentive spirometry    Essential hypertension  Assessment & Plan  Home meds; Lasix, Toprol-XL and Zestril at home  Currently on Toprol-XL and Lasix  Zestril on hold  Monitor vitals as per protocol    Lymphoma [facility])  Assessment & Plan  History of small cell lymphocytic lymphoma  Diagnosed with lymphoma in 1996 and was treated at [facility]  In remission  Follows up with the oncologist at [facility]  Patient is scheduled to have PET scan on April 30th as outpatient    Dyslipidemia  Assessment & Plan  On statin    Type 2 diabetes mellitus without complication, without long-term current use of insulin [facility])  Assessment & Plan  Lab Results   Component Value Date    HGBA1C 5 8 (H) 12/30/2020       Recent Labs     04/19/21  1108 04/19/21  1612 04/19/21  2108 04/20/21  0810   POCGLU 261* 322* 289* 171*       Blood Sugar Average: Last 72 hrs:  (P) 239 hold metformin  Accu-Chek a c  HS with Humalog sliding scale        Discharging Physician / Practitioner: Arcenio Bentley MD  PCP: Yissel Rush, DO  Admission Date:   Admission Orders (From admission, onward)     Ordered        04/15/21 0736  Inpatient Admission  Once                   Discharge Date: 04/20/21    Resolved Problems  Date Reviewed: 4/19/2021    None          Consultations During Hospital Stay:  · Pulmonary    Procedures Performed:   ·     Significant Findings / Test Results:   · 4/15/2021 CTA PE CHEST    FINDINGS:     PULMONARY ARTERIES:  No pulmonary embolus       LUNGS:  Extensive new bilateral groundglass opacity and septal thickening  Previous lung nodules poorly demonstrated  Persistence of 1 5 cm and smaller juxtapleural nodules in the right upper lobe (3/29, 43) and left upper lobe (3/42)  Benign calcified   granulomas      AIRWAYS: No significant filling defects      PLEURA:  Persistent small left pleural effusion  Increase in small right pleural effusion      HEART/GREAT VESSELS:  Normal heart size  AVR  CABG      MEDIASTINUM AND TERRI:  Redemonstration of enlarged precarinal and subcarinal nodes      CHEST WALL AND LOWER NECK: Unremarkable      UPPER ABDOMEN:  Hepatic cysts      OSSEOUS STRUCTURES:  Mild degenerative disease in the spine and both glenohumeral joints      IMPRESSION:     No pulmonary embolus      Extensive new bilateral groundglass opacity and septal thickening, likely a combination of Covid-19 pneumonia and edema      Redemonstration of juxtapleural nodules  The previous parenchymal nodules are not well demonstrated due to the new groundglass opacity  Persistent mediastinal lymphadenopathy  These abnormalities should be reevaluated in 3 months, after clearing of   acute infection      Small bilateral pleural effusions, greater on the left    The right effusion has increased slightly from 3/26/2021      Incidental Findings:   ·      Test Results Pending at Discharge (will require follow up):   ·      Outpatient Tests Requested:  ·     Complications:      Reason for Admission: Shortness of breath    Hospital Course:     Sebastian Reeves is a 77 y o  male patient with PMH of lymphoma, who originally re-presented to the hospital on 4/15/2021 due to shortness of breath  He was managed for COVID pneumonia (04/6- 04/10), completed his full course of remdesivir, and he was discharged home but he re-presented on the 15th of April with progressively worsening shortness of breath and was found to be hypoxic to 86% in the ER  He was started on IV steroids and multivitamin cocktail  His initial Ddimer was 1 53 and he was started on Eliquis  Imaging, CTA PE and venous dopplers returned negative for acute thrombosis and Eliquis was discontinued  His inflammatory markers downtrended    He also complained of mild hemoptysis - this was attributed to Eliquis use and the hemoptysis resolved  He had home oxygen desaturation seen carried out and he qualified for home Oxygen 6L NC on exertion, 2L NC at rest  He is to follow up closely with Pulmonary and his PCP on discharge    Please see above list of diagnoses and related plan for additional information  Condition at Discharge: stable     Discharge Day Visit / Exam:     Subjective:  No overnight events, feeling much better today, reports that he slept better, hemoptysis resolved, still coughing occasionally    Vitals: Blood Pressure: 167/82 (04/20/21 0811)  Pulse: 65 (04/19/21 2133)  Temperature: (!) 97 °F (36 1 °C) (04/20/21 0811)  Temp Source: Axillary (04/19/21 2133)  Respirations: 18 (04/19/21 2133)  Height: 5' 11" (180 3 cm) (04/18/21 0700)  Weight - Scale: 73 2 kg (161 lb 6 oz) (04/20/21 0300)  SpO2: 95 % (04/20/21 0819)  Exam:   Physical Exam  Vitals signs and nursing note reviewed  Constitutional:       General: He is not in acute distress  Appearance: Normal appearance  He is not ill-appearing, toxic-appearing or diaphoretic  Cardiovascular:      Rate and Rhythm: Normal rate and regular rhythm  Pulses: Normal pulses  Heart sounds: No murmur  No gallop  Pulmonary:      Effort: Pulmonary effort is normal  No respiratory distress  Breath sounds: Normal breath sounds  No stridor  No wheezing, rhonchi or rales  Chest:      Chest wall: No tenderness  Abdominal:      General: Bowel sounds are normal  There is no distension  Palpations: Abdomen is soft  Tenderness: There is no abdominal tenderness  There is no right CVA tenderness, left CVA tenderness or guarding  Musculoskeletal: Normal range of motion  General: No swelling, tenderness, deformity or signs of injury  Right lower leg: No edema  Left lower leg: No edema  Skin:     General: Skin is warm and dry  Capillary Refill: Capillary refill takes less than 2 seconds  Coloration: Skin is not jaundiced or pale  Findings: No bruising, erythema, lesion or rash  Neurological:      General: No focal deficit present  Mental Status: He is alert  Mental status is at baseline  Cranial Nerves: No cranial nerve deficit  Psychiatric:         Mood and Affect: Mood normal          Thought Content: Thought content normal          Judgment: Judgment normal        Discussion with Family: I updated patient's wife    Discharge instructions/Information to patient and family:   See after visit summary for information provided to patient and family  Provisions for Follow-Up Care:  See after visit summary for information related to follow-up care and any pertinent home health orders  Disposition:     Home with VNA Services (Reminder: Complete face to face encounter)    For Discharges to [facility] SNF:   · Not Applicable to this Patient - Not Applicable to this Patient    Planned Readmission: No     Discharge Statement:  I spent 45 minutes discharging the patient  This time was spent on the day of discharge  I had direct contact with the patient on the day of discharge   Greater than 50% of the total time was spent examining patient, answering all patient questions, arranging and discussing plan of care with patient as well as directly providing post-discharge instructions  Additional time then spent on discharge activities  Discharge Medications:  See after visit summary for reconciled discharge medications provided to patient and family        ** Please Note: This note has been constructed using a voice recognition system **

## 2021-04-20 NOTE — CASE MANAGEMENT
Continue to follow  Acknowledge that Pt will need home oxygen upon discharge  Call placed to Pt in hospital room due to COVID protocol  CM informed Pt of need for home oxygen upon discharge and Pt in agreement  Freedom of Choice given for DME and Pt is choosing [facility]      Referral was sent through [place] to [facility] for home oxygen  Await determination  Call placed to Pt's wife(Pavel: [phone]), left message for discharge planning today  Anticipate return call  CM to follow

## 2021-04-20 NOTE — CASE MANAGEMENT
Continue to follow  Call received from Pt's wife(Pavel: [phone])  CM informed Pt's wife that Pt will be going home with oxygen and Pt has chosen [facility]  CM informed Pt's wife that CM will give Pt portable tank to go home with and [facility] will be in contact with Pt and/or her to schedule delivery for concentrator  Pt's wife expressed understanding  Pt's wife aware that MARCELA will see Pt at home for VNA services  Message received from [place] at [facility] via Nancy Laboy informed CM that portable tank can be taken from consignment closet and given to Pt  Afrebecca informed CM that scheduling will be reaching out to Pt or Pt's wife to schedule delivery for concentrator  CM notified by WINTER that Pt will need 30 day Eliquis coupon  Obtained portable tank from consignment closet left outside of Pt's hospital room  Call placed into Pt's room via sarah davis due to COVID+  CM informed Pt that portable tank is outside of his room and that Pt will be given 30 day Eliquis coupon upon discharge  Pt expressed understanding  Pt's nurse(Judy) aware to give Pt 30 day Eliquis coupon  Call placed to Pt's wife(Pavel: [phone]), left message to inform that portable tank was obtained from [facility] for Pt and scheduling from [facility] will be in contact to schedule delivery for concentrator  Anticipate return call  CM to follow

## 2021-04-20 NOTE — ASSESSMENT & PLAN NOTE
Pneumonia due to COVID infection, poa, a/e/b sob, tachypnea, hypoxia 86% RA > 6L NC  Diagnosed with COVID19 on 4/01/2021  Continue with supplemental oxygen, Wean as tolerated  · CXR revealed " Mild progression of bilateral groundglass opacities "  · Chest CT scan revealed "No pulmonary embolus  Extensive new bilateral groundglass opacity and septal thickening, likely a combination of Covid-19 pneumonia and edema  · Cardiac markers: troponin, CK- 24, BNP-7995   On Vitamin D3 2000 IU daily, vitamin C 1g PO BID, zinc 220mg PO daily    Continue on dexamethasone and Pepcid   Patient recently completed the course of remdesivir   Does not qualify for convalescent plasma therapy as symptoms > than 5 days   Encourage Self proning    Initially started on Levaquin and doxycycline  Procalcitonin negative x2  Antibiotics have been discontinued     Currently on Eliquis given elevated Ddimer, venous doppler negative   Hemoptysis resolved, medically stable for discharge   Patient did not meet criteria for covid variant testing, 2nd dose of his vaccine was on the 29th and he tested positive on 04/01/21   Would Discharge patient on 30days of prophylactic eliquis improve score (3points), Ddimer > 1, CM to provide coupon   Would DC on 6mg decadron x 5days to complete 10day course   Home desaturation screen - qualified for oxygen 2L NC rest, 6L NC exercise     Trend inflammatory markers q48hrs  Lab Results   Component Value Date/Time    DDIMER 1 08 (H) 04/19/2021 06:02 AM    DDIMER 2 05 (H) 04/17/2021 05:10 AM    DDIMER 0 92 (H) 04/16/2021 04:58 AM    DDIMER 0,166 (H) 02/14/2019 05:52 AM    CRP 70 1 (H) 04/19/2021 06:02 AM    CRP 83 3 (H) 04/17/2021 05:10 AM    CRP 98 6 (H) 04/16/2021 04:58 AM    FERRITIN 528 (H) 04/19/2021 04:08 AM    FERRITIN 308 04/16/2021 04:58 AM    FERRITIN 333 04/07/2021 03:00 AM

## 2021-04-20 NOTE — ASSESSMENT & PLAN NOTE
History of small cell lymphocytic lymphoma  Diagnosed with lymphoma in 1996 and was treated at [facility]    In remission  [facility]  Patient is scheduled to have PET scan on April 30th as outpatient

## 2021-04-20 NOTE — UTILIZATION REVIEW
Notification of Discharge   This is a Notification of Discharge from our facility [address]  Please be advised that this patient has been discharge from our facility  Below you will find the admission and discharge date and time including the patients disposition  UTILIZATION REVIEW CONTACT:  [facility]  Utilization   Network Utilization Review Department  Phone: [phone] x carefully listen to the prompts  All voicemails are confidential   Email: [email or web address]     PHYSICIAN ADVISORY SERVICES:  FOR [id number]-[id number] REVIEW - MEDICAL NECESSITY DENIAL  Phone: [phone]  Fax: [phone]  Email: [email or web address]     PRESENTATION DATE: 4/15/2021  5:43 AM  OBERVATION ADMISSION DATE:   INPATIENT ADMISSION DATE: 4/15/21 0737   DISCHARGE DATE: 4/20/2021  2:52 PM  DISPOSITION: Home with [place] with [address] [place] [address] Road INFORMATION:  Send all requests for admission clinical reviews, approved or denied determinations and any other requests to dedicated fax number below belonging to the campus where the patient is receiving treatment   List of dedicated fax numbers:  [address] DENIALS (Administrative/Medical Necessity) [phone]   [address] (Maternity/NICU/Pediatrics) [phone]   Jessica Lincoln [phone]   [facility] [phone]   Ioana Lassiter [phone]    [address] [phone]   [facility]  [phone]   [address] [facility] [address]  [address] [facility] [address] [phone]

## 2021-04-20 NOTE — DISCHARGE INSTRUCTIONS
Apixaban (By mouth)   Apixaban (a-PIX-a-ban)  Treats and prevents blood clots  This medicine is a blood thinner  Brand Name(s): Eliquis Eliqumarshal 30 Day DVT/PE Starter Pack   There may be other brand names for this medicine  When This Medicine Should Not Be Used: This medicine is not right for everyone  Do not use it if you had an allergic reaction to apixaban or you have active bleeding  How to Use This Medicine:   Tablet  · Your doctor will tell you how much medicine to use  Do not use more than directed  · If you are not able to swallow the tablets whole, they may be crushed and mixed in water, 5% dextrose in water (D5W), apple juice, or applesauce  The crushed tablets may be mixed with 60 mL of water or D5W dose and given through a nasogastric tube (NGT)  · This medicine should come with a Medication Guide  Ask your pharmacist for a copy if you do not have one  · Missed dose: Take a dose as soon as you remember  If it is almost time for your next dose, wait until then and take a regular dose  Do not take extra medicine to make up for a missed dose  · Store the medicine in a closed container at room temperature, away from heat, moisture, and direct light  Drugs and Foods to Avoid:   Ask your doctor or pharmacist before using any other medicine, including over-the-counter medicines, vitamins, and herbal products  · Some medicines can affect how apixaban works  Tell your doctor if you are using any of the following:   ? Carbamazepine, itraconazole, ketoconazole, phenytoin, rifampin, ritonavir, Adbi's wort  ? Blood thinner (including clopidogrel, heparin, prasugrel, warfarin)  ? Medicine to treat depression  ?  NSAID pain or arthritis medicine (including aspirin, celecoxib, diclofenac, ibuprofen, naproxen)  Warnings While Using This Medicine:   · Tell your doctor if you are pregnant or breastfeeding, or if you have kidney disease, liver disease, bleeding problems, antiphospholipid syndrome, or an artificial heart valve  · Do not stop using this medicine suddenly without asking your doctor  You might have a higher risk of stroke for a short time after you stop using this medicine  · This medicine increases your risk for bleeding that can become serious if not controlled  You may also bruise easily, and it may take longer than usual for bleeding to stop  · This medicine may increase your risk for a hematoma (blood clot) in your spine or back if you undergo an epidural or spinal puncture  This could lead to paralysis  Tell your doctor if you ever had spine problems or back surgery  · Tell any doctor or dentist who treats you that you are using this medicine  With your doctor's supervision, you may need to stop using this medicine several days before you have surgery or medical tests  · Your doctor will do lab tests at regular visits to check on the effects of this medicine  Keep all appointments  · Keep all medicine out of the reach of children  Never share your medicine with anyone  Possible Side Effects While Using This Medicine:   Call your doctor right away if you notice any of these side effects:  · Allergic reaction: Itching or hives, swelling in your face or hands, swelling or tingling in your mouth or throat, chest tightness, trouble breathing  · Change in how much or how often you urinate, red or pink urine  · Chest pain, trouble breathing  · Coughing up blood, vomiting blood or material that looks like coffee grounds  · Numbness, tingling, or muscle weakness in your legs or feet  · Red or black, tarry stools  · Unusual bleeding, bruising, or weakness  If you notice other side effects that you think are caused by this medicine, tell your doctor  Call your doctor for medical advice about side effects   You may report side effects to FDA at 0-932-FDA-2337  © Copyright [address] [place] [address] Drive Information is for End User's use only and may not be sold, redistributed or otherwise used for commercial purposes  The above information is an  only  It is not intended as medical advice for individual conditions or treatments  Talk to your doctor, nurse or pharmacist before following any medical regimen to see if it is safe and effective for you

## 2021-04-22 ENCOUNTER — TELEPHONE (OUTPATIENT)
Dept: FAMILY MEDICINE CLINIC | Facility: HOSPITAL | Age: 76
End: 2021-04-22

## 2021-04-22 ENCOUNTER — DOCUMENTATION (OUTPATIENT)
Dept: SOCIAL WORK | Facility: HOSPITAL | Age: 76
End: 2021-04-22

## 2021-04-22 NOTE — TELEPHONE ENCOUNTER
Unfortunately steroids can cause sleep issues - would avoid any sedation with sleep medication d/t fall risk - can try OTC melatonin 3 mg before bed

## 2021-04-22 NOTE — TELEPHONE ENCOUNTER
Pt was given steroids at discharge from hospital  Pt is complaining of not being able to sleep  Pt is asking if there is anything that he can take to help him sleep that is safe with all of his other medications  Please advise

## 2021-04-22 NOTE — TELEPHONE ENCOUNTER
Patient Stacia Foy called states that someone had called to do his TCM  Per patient the nurse asked all the questions but the appointment was not made  Scheduled patient for May 3 with Dr Ciara Watson  Patient has some additional questions about medication      PCB Stacia Foy can be reached at [phone]

## 2021-04-23 ENCOUNTER — TELEPHONE (OUTPATIENT)
Dept: FAMILY MEDICINE CLINIC | Facility: HOSPITAL | Age: 76
End: 2021-04-23

## 2021-04-23 NOTE — TELEPHONE ENCOUNTER
Was COVID + and hospitalized at the beginning of the month  Still having symptoms, what can he take to treat the symptoms at this point?

## 2021-04-23 NOTE — TELEPHONE ENCOUNTER
Pt has a sore throat, cough at night time with minimal secretions, and runny nose  Could he take musinex? Or what do we recommend?

## 2021-04-23 NOTE — TELEPHONE ENCOUNTER
Can use Robitussin for cough as well as Mucinex for congestion/runny nose, cough and symptoms can persist for months

## 2021-05-02 ENCOUNTER — HOSPITAL ENCOUNTER (INPATIENT)
Facility: HOSPITAL | Age: 76
LOS: 18 days | Discharge: HOME HEALTH | DRG: 871 | End: 2021-05-20
Attending: EMERGENCY MEDICINE | Admitting: ANESTHESIOLOGY
Payer: COMMERCIAL

## 2021-05-02 ENCOUNTER — TELEPHONE (OUTPATIENT)
Dept: OTHER | Facility: OTHER | Age: 76
End: 2021-05-02

## 2021-05-02 ENCOUNTER — APPOINTMENT (EMERGENCY)
Dept: RADIOLOGY | Facility: HOSPITAL | Age: 76
DRG: 871 | End: 2021-05-02
Payer: COMMERCIAL

## 2021-05-02 DIAGNOSIS — J12.82 PNEUMONIA DUE TO COVID-19 VIRUS: ICD-10-CM

## 2021-05-02 DIAGNOSIS — U07.1 PNEUMONIA DUE TO COVID-19 VIRUS: ICD-10-CM

## 2021-05-02 DIAGNOSIS — J96.01 ACUTE RESPIRATORY FAILURE WITH HYPOXIA (HCC): ICD-10-CM

## 2021-05-02 DIAGNOSIS — J18.9 HCAP (HEALTHCARE-ASSOCIATED PNEUMONIA): Primary | ICD-10-CM

## 2021-05-02 LAB
ALBUMIN SERPL BCP-MCNC: 2.3 G/DL (ref 3.5–5)
ALP SERPL-CCNC: 126 U/L (ref 46–116)
ALT SERPL W P-5'-P-CCNC: 23 U/L (ref 12–78)
ANION GAP SERPL CALCULATED.3IONS-SCNC: 10 MMOL/L (ref 4–13)
APTT PPP: 32 SECONDS (ref 23–37)
AST SERPL W P-5'-P-CCNC: 14 U/L (ref 5–45)
BASOPHILS # BLD AUTO: 0.05 THOUSANDS/ΜL (ref 0–0.1)
BASOPHILS NFR BLD AUTO: 0 % (ref 0–1)
BILIRUB SERPL-MCNC: 0.8 MG/DL (ref 0.2–1)
BUN SERPL-MCNC: 12 MG/DL (ref 5–25)
CALCIUM ALBUM COR SERPL-MCNC: 9.7 MG/DL (ref 8.3–10.1)
CALCIUM SERPL-MCNC: 8.3 MG/DL (ref 8.3–10.1)
CHLORIDE SERPL-SCNC: 99 MMOL/L (ref 100–108)
CO2 SERPL-SCNC: 30 MMOL/L (ref 21–32)
CREAT SERPL-MCNC: 1.03 MG/DL (ref 0.6–1.3)
EOSINOPHIL # BLD AUTO: 0.21 THOUSAND/ΜL (ref 0–0.61)
EOSINOPHIL NFR BLD AUTO: 2 % (ref 0–6)
ERYTHROCYTE [DISTWIDTH] IN BLOOD BY AUTOMATED COUNT: 19.3 % (ref 11.6–15.1)
GFR SERPL CREATININE-BSD FRML MDRD: 71 ML/MIN/1.73SQ M
GLUCOSE SERPL-MCNC: 164 MG/DL (ref 65–140)
GLUCOSE SERPL-MCNC: 165 MG/DL (ref 65–140)
GLUCOSE SERPL-MCNC: 196 MG/DL (ref 65–140)
HCT VFR BLD AUTO: 27.9 % (ref 36.5–49.3)
HGB BLD-MCNC: 8.7 G/DL (ref 12–17)
IMM GRANULOCYTES # BLD AUTO: 0.08 THOUSAND/UL (ref 0–0.2)
IMM GRANULOCYTES NFR BLD AUTO: 1 % (ref 0–2)
INR PPP: 1.66 (ref 0.84–1.19)
LACTATE SERPL-SCNC: 2 MMOL/L (ref 0.5–2)
LACTATE SERPL-SCNC: 2.7 MMOL/L (ref 0.5–2)
LACTATE SERPL-SCNC: 3.6 MMOL/L (ref 0.5–2)
LYMPHOCYTES # BLD AUTO: 0.58 THOUSANDS/ΜL (ref 0.6–4.47)
LYMPHOCYTES NFR BLD AUTO: 5 % (ref 14–44)
MCH RBC QN AUTO: 25.1 PG (ref 26.8–34.3)
MCHC RBC AUTO-ENTMCNC: 31.2 G/DL (ref 31.4–37.4)
MCV RBC AUTO: 80 FL (ref 82–98)
MONOCYTES # BLD AUTO: 0.96 THOUSAND/ΜL (ref 0.17–1.22)
MONOCYTES NFR BLD AUTO: 9 % (ref 4–12)
NEUTROPHILS # BLD AUTO: 9.33 THOUSANDS/ΜL (ref 1.85–7.62)
NEUTS SEG NFR BLD AUTO: 83 % (ref 43–75)
NRBC BLD AUTO-RTO: 0 /100 WBCS
PLATELET # BLD AUTO: 216 THOUSANDS/UL (ref 149–390)
PMV BLD AUTO: 11 FL (ref 8.9–12.7)
POTASSIUM SERPL-SCNC: 3.9 MMOL/L (ref 3.5–5.3)
PROCALCITONIN SERPL-MCNC: 0.18 NG/ML
PROCALCITONIN SERPL-MCNC: 0.21 NG/ML
PROT SERPL-MCNC: 6.1 G/DL (ref 6.4–8.2)
PROTHROMBIN TIME: 19.5 SECONDS (ref 11.6–14.5)
RBC # BLD AUTO: 3.47 MILLION/UL (ref 3.88–5.62)
SODIUM SERPL-SCNC: 139 MMOL/L (ref 136–145)
WBC # BLD AUTO: 11.21 THOUSAND/UL (ref 4.31–10.16)

## 2021-05-02 PROCEDURE — 80053 COMPREHEN METABOLIC PANEL: CPT | Performed by: EMERGENCY MEDICINE

## 2021-05-02 PROCEDURE — 99285 EMERGENCY DEPT VISIT HI MDM: CPT

## 2021-05-02 PROCEDURE — 87040 BLOOD CULTURE FOR BACTERIA: CPT | Performed by: EMERGENCY MEDICINE

## 2021-05-02 PROCEDURE — 82948 REAGENT STRIP/BLOOD GLUCOSE: CPT

## 2021-05-02 PROCEDURE — 71045 X-RAY EXAM CHEST 1 VIEW: CPT

## 2021-05-02 PROCEDURE — 99291 CRITICAL CARE FIRST HOUR: CPT | Performed by: EMERGENCY MEDICINE

## 2021-05-02 PROCEDURE — 36415 COLL VENOUS BLD VENIPUNCTURE: CPT | Performed by: EMERGENCY MEDICINE

## 2021-05-02 PROCEDURE — 85025 COMPLETE CBC W/AUTO DIFF WBC: CPT | Performed by: EMERGENCY MEDICINE

## 2021-05-02 PROCEDURE — 84145 PROCALCITONIN (PCT): CPT | Performed by: FAMILY MEDICINE

## 2021-05-02 PROCEDURE — 85610 PROTHROMBIN TIME: CPT | Performed by: EMERGENCY MEDICINE

## 2021-05-02 PROCEDURE — 85730 THROMBOPLASTIN TIME PARTIAL: CPT | Performed by: EMERGENCY MEDICINE

## 2021-05-02 PROCEDURE — 93005 ELECTROCARDIOGRAM TRACING: CPT

## 2021-05-02 PROCEDURE — 99223 1ST HOSP IP/OBS HIGH 75: CPT | Performed by: FAMILY MEDICINE

## 2021-05-02 PROCEDURE — 83605 ASSAY OF LACTIC ACID: CPT | Performed by: EMERGENCY MEDICINE

## 2021-05-02 PROCEDURE — 83605 ASSAY OF LACTIC ACID: CPT | Performed by: FAMILY MEDICINE

## 2021-05-02 PROCEDURE — 87081 CULTURE SCREEN ONLY: CPT | Performed by: FAMILY MEDICINE

## 2021-05-02 PROCEDURE — 84145 PROCALCITONIN (PCT): CPT | Performed by: EMERGENCY MEDICINE

## 2021-05-02 RX ORDER — LISINOPRIL 20 MG/1
20 TABLET ORAL DAILY
Status: DISCONTINUED | OUTPATIENT
Start: 2021-05-03 | End: 2021-05-16

## 2021-05-02 RX ORDER — ASPIRIN 81 MG/1
81 TABLET, CHEWABLE ORAL DAILY
Status: DISCONTINUED | OUTPATIENT
Start: 2021-05-03 | End: 2021-05-20 | Stop reason: HOSPADM

## 2021-05-02 RX ORDER — NITROGLYCERIN 0.6 MG/1
0.3 TABLET SUBLINGUAL
Status: DISCONTINUED | OUTPATIENT
Start: 2021-05-02 | End: 2021-05-02

## 2021-05-02 RX ORDER — VANCOMYCIN HYDROCHLORIDE 1 G/200ML
12.5 INJECTION, SOLUTION INTRAVENOUS EVERY 12 HOURS
Status: DISCONTINUED | OUTPATIENT
Start: 2021-05-02 | End: 2021-05-05

## 2021-05-02 RX ORDER — SACCHAROMYCES BOULARDII 250 MG
250 CAPSULE ORAL 2 TIMES DAILY
Status: DISCONTINUED | OUTPATIENT
Start: 2021-05-02 | End: 2021-05-20 | Stop reason: HOSPADM

## 2021-05-02 RX ORDER — METHYLPREDNISOLONE SODIUM SUCCINATE 125 MG/2ML
80 INJECTION, POWDER, LYOPHILIZED, FOR SOLUTION INTRAMUSCULAR; INTRAVENOUS ONCE
Status: COMPLETED | OUTPATIENT
Start: 2021-05-02 | End: 2021-05-02

## 2021-05-02 RX ORDER — NITROGLYCERIN 0.4 MG/1
0.4 TABLET SUBLINGUAL
Status: DISCONTINUED | OUTPATIENT
Start: 2021-05-02 | End: 2021-05-20 | Stop reason: HOSPADM

## 2021-05-02 RX ORDER — MESALAMINE 4 G/60ML
4 ENEMA RECTAL
Status: DISCONTINUED | OUTPATIENT
Start: 2021-05-02 | End: 2021-05-02

## 2021-05-02 RX ORDER — ONDANSETRON 2 MG/ML
4 INJECTION INTRAMUSCULAR; INTRAVENOUS EVERY 6 HOURS PRN
Status: DISCONTINUED | OUTPATIENT
Start: 2021-05-02 | End: 2021-05-20 | Stop reason: HOSPADM

## 2021-05-02 RX ORDER — LEVOFLOXACIN 5 MG/ML
750 INJECTION, SOLUTION INTRAVENOUS EVERY 24 HOURS
Status: COMPLETED | OUTPATIENT
Start: 2021-05-03 | End: 2021-05-08

## 2021-05-02 RX ORDER — METOPROLOL SUCCINATE 50 MG/1
100 TABLET, EXTENDED RELEASE ORAL DAILY
Status: DISCONTINUED | OUTPATIENT
Start: 2021-05-03 | End: 2021-05-20 | Stop reason: HOSPADM

## 2021-05-02 RX ORDER — ATORVASTATIN CALCIUM 40 MG/1
40 TABLET, FILM COATED ORAL
Status: DISCONTINUED | OUTPATIENT
Start: 2021-05-02 | End: 2021-05-20 | Stop reason: HOSPADM

## 2021-05-02 RX ORDER — FERROUS SULFATE 325(65) MG
325 TABLET ORAL
Status: DISCONTINUED | OUTPATIENT
Start: 2021-05-03 | End: 2021-05-15

## 2021-05-02 RX ORDER — ACETAMINOPHEN 325 MG/1
975 TABLET ORAL ONCE
Status: COMPLETED | OUTPATIENT
Start: 2021-05-02 | End: 2021-05-02

## 2021-05-02 RX ORDER — MELATONIN
2000 DAILY
Status: DISCONTINUED | OUTPATIENT
Start: 2021-05-03 | End: 2021-05-20 | Stop reason: HOSPADM

## 2021-05-02 RX ORDER — LEVOFLOXACIN 5 MG/ML
750 INJECTION, SOLUTION INTRAVENOUS ONCE
Status: COMPLETED | OUTPATIENT
Start: 2021-05-02 | End: 2021-05-02

## 2021-05-02 RX ORDER — LEVOFLOXACIN 5 MG/ML
750 INJECTION, SOLUTION INTRAVENOUS EVERY 24 HOURS
Status: DISCONTINUED | OUTPATIENT
Start: 2021-05-02 | End: 2021-05-02

## 2021-05-02 RX ORDER — ACETAMINOPHEN 325 MG/1
650 TABLET ORAL EVERY 6 HOURS PRN
Status: DISCONTINUED | OUTPATIENT
Start: 2021-05-02 | End: 2021-05-20 | Stop reason: HOSPADM

## 2021-05-02 RX ORDER — MULTIVITAMIN/IRON/FOLIC ACID 18MG-0.4MG
1 TABLET ORAL DAILY
Status: DISCONTINUED | OUTPATIENT
Start: 2021-05-03 | End: 2021-05-20 | Stop reason: HOSPADM

## 2021-05-02 RX ORDER — PANTOPRAZOLE SODIUM 40 MG/1
40 TABLET, DELAYED RELEASE ORAL
Status: DISCONTINUED | OUTPATIENT
Start: 2021-05-03 | End: 2021-05-20 | Stop reason: HOSPADM

## 2021-05-02 RX ADMIN — INSULIN LISPRO 1 UNITS: 100 INJECTION, SOLUTION INTRAVENOUS; SUBCUTANEOUS at 18:38

## 2021-05-02 RX ADMIN — METHYLPREDNISOLONE SODIUM SUCCINATE 80 MG: 125 INJECTION, POWDER, FOR SOLUTION INTRAMUSCULAR; INTRAVENOUS at 18:39

## 2021-05-02 RX ADMIN — Medication 250 MG: at 18:39

## 2021-05-02 RX ADMIN — INSULIN LISPRO 1 UNITS: 100 INJECTION, SOLUTION INTRAVENOUS; SUBCUTANEOUS at 21:26

## 2021-05-02 RX ADMIN — VANCOMYCIN HYDROCHLORIDE 1000 MG: 1 INJECTION, SOLUTION INTRAVENOUS at 18:51

## 2021-05-02 RX ADMIN — SODIUM CHLORIDE 500 ML: 0.9 INJECTION, SOLUTION INTRAVENOUS at 19:53

## 2021-05-02 RX ADMIN — AZTREONAM 2000 MG: 2 INJECTION, POWDER, LYOPHILIZED, FOR SOLUTION INTRAMUSCULAR; INTRAVENOUS at 16:59

## 2021-05-02 RX ADMIN — ACETAMINOPHEN 975 MG: 325 TABLET, FILM COATED ORAL at 16:43

## 2021-05-02 RX ADMIN — LEVOFLOXACIN 750 MG: 5 INJECTION, SOLUTION INTRAVENOUS at 16:39

## 2021-05-02 RX ADMIN — APIXABAN 2.5 MG: 2.5 TABLET, FILM COATED ORAL at 18:39

## 2021-05-02 NOTE — ASSESSMENT & PLAN NOTE
· Secondary to pneumonia  · Patient met the criteria for sepsis with fever and leukocytosis  · Patient also noted to have lactic acidosis and tachypnea  · Repeat lactic acid to ensure resolution  ·

## 2021-05-02 NOTE — ASSESSMENT & PLAN NOTE
Wt Readings from Last 3 Encounters:   04/20/21 73 2 kg (161 lb 6 oz)   04/10/21 76 8 kg (169 lb 5 oz)   04/02/21 79 4 kg (175 lb)     Hold diuretics for now  Monitor volume status  Daily weight and intake and output

## 2021-05-02 NOTE — SEPSIS NOTE
Sepsis Note   Inessa Coates 77 y o  male MRN: [id number]  Unit/Bed#: ED 03 Encounter: [id number]      qSOFA     Row Name 05/02/21 1600 05/02/21 1441 05/02/21 1439          Altered mental status GCS < 15  --  --  --      Respiratory Rate > / =22  1  1  1      Systolic BP < / =560  0  0  --      Q Sofa Score  1  1  --          Initial Sepsis Screening     Row Name 05/02/21 1709                Is the patient's history suggestive of a new or worsening infection? (!) Yes (Proceed)  -VL        Suspected source of infection  pneumonia  -VL        Are two or more of the following signs & symptoms of infection both present and new to the patient? (!) Yes (Proceed)  -VL        Indicate SIRS criteria  Tachypnea > 20 resp per min; Tachycardia > 90 bpm;Hyperthemia > 38 3C (100 9F)  -VL        If the answer is yes to both questions, suspicion of sepsis is present  --        If severe sepsis is present AND tissue hypoperfusion perists in the hour after fluid resuscitation or lactate > 4, the patient meets criteria for SEPTIC SHOCK  --        Are any of the following organ dysfunction criteria present within 6 hours of suspected infection and SIRS criteria that are NOT considered to be chronic conditions?   (!) Yes  -VL        Organ dysfunction  Lactate > 2 0 mmol/L  -VL        Date of presentation of severe sepsis  05/02/21  -        Time of presentation of severe sepsis  1617  -VL        Tissue hypoperfusion persists in the hour after crystalloid fluid administration, evidenced, by either:  --        Was hypotension present within one hour of the conclusion of crystalloid fluid administration?  --        Date of presentation of septic shock  --        Time of presentation of septic shock  --          User Key  (r) = Recorded By, (t) = Taken By, (c) = Cosigned By    [address] Name Provider Type     Steven Comment, DO Physician

## 2021-05-02 NOTE — ED PROVIDER NOTES
History  Chief Complaint   Patient presents with    Shortness of Breath     pt reports having covid on and off for a couple weeks  was gertting better, and then woke up today felling short of breath again  reports fever at home of 80     75y M here with recurrent fever, dyspnea  Diagnosed w/ COVID around 4/1/21 and has had recurrent hospitalizations since then for recurrent pneumonia, shortness of breath/fever  Last admitted 4/15-4/16 and states had been doing well until today  Woke this am w/ increased SOB  Is on home oxygen at 2L NC and states that wasn't really helping  Noted shaking chills and fever to 103 at home so came to the ED for evaluation  Upon arrival, pt noted to be 77% on RA for pulse ox and improved on 5L NC  Denies cp, +mild anorexia today, no n/v/d, no le pain or swelling  Notes mild, non-productive cough  History provided by:  Patient and medical records   used: No    Shortness of Breath  Severity:  Moderate  Onset quality:  Sudden  Duration:  1 day  Timing:  Constant  Progression:  Worsening  Chronicity:  New  Relieved by:  Nothing  Worsened by:  Exertion, movement and coughing  Ineffective treatments:  None tried  Associated symptoms: cough and fever    Associated symptoms: no abdominal pain, no diaphoresis, no hemoptysis, no sputum production, no swollen glands, no vomiting and no wheezing        Prior to Admission Medications   Prescriptions Last Dose Informant Patient Reported? Taking?    apixaban (ELIQUIS) 2 5 mg   No No   Sig: Take 1 tablet (2 5 mg total) by mouth 2 (two) times a day   aspirin 81 MG tablet  Self Yes No   Sig: Take 1 tablet (81 mg total) by mouth daily   atorvastatin (LIPITOR) 40 mg tablet  Self No No   Sig: Take 1 tablet (40 mg total) by mouth daily   cholecalciferol (VITAMIN D3) 1,000 units tablet   No No   Sig: Take 2 tablets (2,000 Units total) by mouth daily   diphenoxylate-atropine (LOMOTIL) 2 5-0 025 mg per tablet   No No   Sig: Take 1 tablet by mouth 4 (four) times a day as needed for diarrhea   ferrous sulfate 324 MG TBEC  Self Yes No   Sig: Take 324 mg by mouth   furosemide (LASIX) 20 mg tablet  Self No No   Sig: TAKE 1 TABLET BY MOUTH TWICE A DAY   lisinopril (ZESTRIL) 20 mg tablet  Self No No   Sig: TAKE 1 TABLET BY MOUTH EVERY DAY   mesalamine (ROWASA) 4 g   No No   Sig: Insert 60 mL (4 g total) into the rectum daily at bedtime   metFORMIN (GLUCOPHAGE-XR) 500 mg 24 hr tablet   No No   Sig: TAKE 1 TABLET BY MOUTH EVERY DAY WITH DINNER   metoprolol succinate (TOPROL-XL) 100 mg 24 hr tablet   No No   Sig: TAKE 1 TABLET BY MOUTH EVERY DAY   multivitamin-minerals (CENTRUM ADULTS) tablet   No No   Sig: Take 1 tablet by mouth daily   nitroglycerin (NITROSTAT) 0 3 mg SL tablet  Self Yes No   Sig: Nitrostat 0 3 mg sublingual tablet   Place 1 tablet as needed by sublingual route as needed for 90 days     omeprazole (PriLOSEC) 40 MG capsule  Self No No   Sig: Take 1 capsule (40 mg total) by mouth daily   saccharomyces boulardii (FLORASTOR) 250 mg capsule   No No   Sig: Take 1 capsule (250 mg total) by mouth 2 (two) times a day      Facility-Administered Medications: None       Past Medical History:   Diagnosis Date    Arthritis     Atrial fibrillation (HCC)     Cataract     Colitis     Colon cancer [facility]     [facility] type II[facility] non insulin dependent [facility]     Fatty liver     History of chemotherapy     History of radiation therapy     History of shingles 2018    Hypertension     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[facility]     Pneumonia     Skin cancer        Past Surgical History:   Procedure Laterality Date    AORTIC VALVE REPLACEMENT      COLECTOMY      COLONOSCOPY  11/2019    Prior right hemicolectomy    CORONARY ARTERY BYPASS GRAFT      DENTAL SURGERY      KNEE SURGERY      LYMPHADENECTOMY      OTHER SURGICAL HISTORY      MAZE PROCEDURE    AR TOTAL KNEE ARTHROPLASTY Left 1/28/2019    Procedure: ARTHROPLASTY LEFT KNEE TOTAL;  Surgeon: Oswald Morocho MD;  Location: [facility] OR;  Service: Orthopedics    SKIN BIOPSY      UPPER GASTROINTESTINAL ENDOSCOPY  2019    [facility]ricket gu       Family History   Problem Relation Age of Onset    Heart block Family     Coronary artery disease Mother     Thrombosis Mother     Hypertension Mother    Lm Hoang Arthritis Mother     Hyperlipidemia Mother     Diabetes Father     Hypertension Father     Arthritis Father     Sudden death Father         cardiac    Colon cancer Paternal Grandfather     Breast cancer Sister     Heart disease Brother         Coronary stents     I have reviewed and agree with the history as documented  E-Cigarette/Vaping    E-Cigarette Use Never User      E-Cigarette/Vaping Substances    Nicotine No     THC No     CBD No     Flavoring No     Other No     Unknown No      Social History     Tobacco Use    Smoking status: Former Smoker     Packs/day: 1 00     Years: 40 00     Pack years: 40 00     Types: Cigarettes     Quit date:      Years since quittin 3    Smokeless tobacco: Never Used   Substance Use Topics    Alcohol use: Yes     Frequency: Monthly or less     Drinks per session: 1 or 2     Binge frequency: Never     Comment: very rare "beer here and there"    Drug use: No       Review of Systems   Constitutional: Positive for fever  Negative for diaphoresis  Respiratory: Positive for cough and shortness of breath  Negative for hemoptysis, sputum production and wheezing  Gastrointestinal: Negative for abdominal pain and vomiting         Physical Exam  Physical Exam    Vital Signs  ED Triage Vitals   Temperature Pulse Respirations Blood Pressure SpO2   21 1439 21 1439 21 1439 21 1441 21 1439   99 7 °F (37 6 °C) (!) 115 22 (!) 193/88 (!) 77 %      Temp Source Heart Rate Source Patient Position - Orthostatic VS BP Location FiO2 (%)   21 1439 21 1439 21 1439 21 1439 --   Temporal Monitor Lying Right arm Pain Score       05/02/21 1441       No Pain           Vitals:    05/02/21 1821 05/02/21 2052 05/03/21 0707 05/03/21 1551   BP: 114/57 128/66 130/66 128/65   Pulse: 79 76 74 83   Patient Position - Orthostatic VS:             Visual Acuity      ED Medications  Medications   apixaban (ELIQUIS) tablet 2 5 mg (2 5 mg Oral Given 5/3/21 0822)   aspirin chewable tablet 81 mg (81 mg Oral Given 5/3/21 0822)   atorvastatin (LIPITOR) tablet 40 mg (40 mg Oral Given 5/3/21 1546)   cholecalciferol (VITAMIN D3) tablet 2,000 Units (2,000 Units Oral Given 5/3/21 0822)   multivitamin-minerals (CENTRUM ADULTS) tablet 1 tablet (1 tablet Oral Given 5/3/21 0822)   pantoprazole (PROTONIX) EC tablet 40 mg (40 mg Oral Given 5/3/21 0532)   saccharomyces boulardii (FLORASTOR) capsule 250 mg (250 mg Oral Given 5/3/21 2731)   ferrous sulfate tablet 325 mg (325 mg Oral Given 5/3/21 0718)   lisinopril (ZESTRIL) tablet 20 mg (20 mg Oral Given 5/3/21 0822)   metoprolol succinate (TOPROL-XL) 24 hr tablet 100 mg (100 mg Oral Given 5/3/21 0822)   ondansetron (ZOFRAN) injection 4 mg (has no administration in time range)   acetaminophen (TYLENOL) tablet 650 mg (has no administration in time range)   vancomycin (VANCOCIN) IVPB (premix in dextrose) 1,000 mg 200 mL (1,000 mg Intravenous New Bag 5/3/21 0530)   insulin lispro (HumaLOG) 100 units/mL subcutaneous injection 1-5 Units (3 Units Subcutaneous Given 5/3/21 1207)   insulin lispro (HumaLOG) 100 units/mL subcutaneous injection 1-5 Units (1 Units Subcutaneous Given 5/2/21 2126)   levofloxacin (LEVAQUIN) IVPB (premix in dextrose) 750 mg 150 mL (750 mg Intravenous New Bag 5/3/21 1546)   nitroglycerin (NITROSTAT) SL tablet 0 4 mg (has no administration in time range)   albuterol (PROVENTIL HFA,VENTOLIN HFA) inhaler 2 puff (2 puffs Inhalation Given 5/3/21 1546)   acetaminophen (TYLENOL) tablet 975 mg (975 mg Oral Given 5/2/21 1643)   aztreonam (AZACTAM) 2,000 mg in sodium chloride 0 9 % 100 mL IVPB (0 mg Intravenous Stopped 5/2/21 2003)   levofloxacin (LEVAQUIN) IVPB (premix in dextrose) 750 mg 150 mL (0 mg Intravenous Stopped 5/2/21 2003)   methylPREDNISolone sodium succinate (Solu-MEDROL) injection 80 mg (80 mg Intravenous Given 5/2/21 1839)   sodium chloride 0 9 % bolus 500 mL (0 mL Intravenous Stopped 5/3/21 0226)       Diagnostic Studies  Results Reviewed     Procedure Component Value Units Date/Time    Blood culture #1 [[id number]] Collected: 05/02/21 1518    Lab Status: Preliminary result Specimen: Blood from Arm, Left Updated: 05/03/21 0101     Blood Culture Received in Microbiology Lab  Culture in Progress  Blood culture #2 [[id number]] Collected: 05/02/21 1518    Lab Status: Preliminary result Specimen: Blood from Arm, Right Updated: 05/03/21 0002     Blood Culture Received in Microbiology Lab  Culture in Progress  Procalcitonin with AM Reflex [[id number]]  (Normal) Collected: 05/02/21 1518    Lab Status: Final result Specimen: Blood from Arm, Left Updated: 05/02/21 2206     Procalcitonin 0 18 ng/ml     Lactic acid 2 Hours [[id number]]  (Abnormal) Collected: 05/02/21 1825    Lab Status: Final result Specimen: Blood from Arm, Right Updated: 05/02/21 1917     LACTIC ACID 2 7 mmol/L     Narrative:      Result may be elevated if tourniquet was used during collection  Lactic acid [[id number]]  (Abnormal) Collected: 05/02/21 1518    Lab Status: Final result Specimen: Blood from Arm, Left Updated: 05/02/21 1618     LACTIC ACID 3 6 mmol/L     Narrative:      Result may be elevated if tourniquet was used during collection      Comprehensive metabolic panel [[id number]]  (Abnormal) Collected: 05/02/21 1518    Lab Status: Final result Specimen: Blood from Arm, Left Updated: 05/02/21 1559     Sodium 139 mmol/L      Potassium 3 9 mmol/L      Chloride 99 mmol/L      CO2 30 mmol/L      ANION GAP 10 mmol/L      BUN 12 mg/dL      Creatinine 1 03 mg/dL      Glucose 164 mg/dL      Calcium 8 3 mg/dL      Corrected Calcium 9 7 mg/dL      AST 14 U/L      ALT 23 U/L      Alkaline Phosphatase 126 U/L      Total Protein 6 1 g/dL      Albumin 2 3 g/dL      Total Bilirubin 0 80 mg/dL      eGFR 71 ml/min/1 73sq m     Narrative:      [place] guidelines for Chronic Kidney Disease (CKD):     Stage 1 with normal or high GFR (GFR > 90 mL/min/1 73 square meters)    Stage 2 Mild CKD (GFR = 60-89 mL/min/1 73 square meters)    Stage 3A Moderate CKD (GFR = 45-59 mL/min/1 73 square meters)    Stage 3B Moderate CKD (GFR = 30-44 mL/min/1 73 square meters)    Stage 4 Severe CKD (GFR = 15-29 mL/min/1 73 square meters)    Stage 5 End Stage CKD (GFR <15 mL/min/1 73 square meters)  Note: GFR calculation is accurate only with a steady state creatinine    Protime-INR [[id number]]  (Abnormal) Collected: 05/02/21 1518    Lab Status: Final result Specimen: Blood from Arm, Left Updated: 05/02/21 1550     Protime 19 5 seconds      INR 1 66    APTT [[id number]]  (Normal) Collected: 05/02/21 1518    Lab Status: Final result Specimen: Blood from Arm, Left Updated: 05/02/21 1550     PTT 32 seconds     CBC and differential [[id number]]  (Abnormal) Collected: 05/02/21 1518    Lab Status: Final result Specimen: Blood from Arm, Left Updated: 05/02/21 1532     WBC 11 21 Thousand/uL      RBC 3 47 Million/uL      Hemoglobin 8 7 g/dL      Hematocrit 27 9 %      MCV 80 fL      MCH 25 1 pg      MCHC 31 2 g/dL      RDW 19 3 %      MPV 11 0 fL      Platelets 106 Thousands/uL      nRBC 0 /100 WBCs      Neutrophils Relative 83 %      Immat GRANS % 1 %      Lymphocytes Relative 5 %      Monocytes Relative 9 %      Eosinophils Relative 2 %      Basophils Relative 0 %      Neutrophils Absolute 9 33 Thousands/µL      Immature Grans Absolute 0 08 Thousand/uL      Lymphocytes Absolute 0 58 Thousands/µL      Monocytes Absolute 0 96 Thousand/µL      Eosinophils Absolute 0 21 Thousand/µL      Basophils Absolute 0 05 Thousands/µL     UA w Reflex to Microscopic w Reflex to Culture [[id number]]     Lab Status: No result Specimen: Urine, Clean Catch                  XR chest 1 view portable   Final Result by Brian Fishman MD (05/02 1527)      Very extensive, mildly progressed bilateral opacities, which in the appropriate clinical setting, are consistent with Covid 19 pneumonia  The study was marked in EPIC for significant notification  Workstation performed: [id number]                    Procedures  CriticalCare Time  Performed by: Patty Ochoa DO  Authorized by: Patty Ochoa DO     Critical care provider statement:     Critical care time (minutes):  35    Critical care time was exclusive of:  Separately billable procedures and treating other patients and teaching time    Critical care was necessary to treat or prevent imminent or life-threatening deterioration of the following conditions:  Respiratory failure and sepsis    Critical care was time spent personally by me on the following activities:  Blood draw for specimens, obtaining history from patient or surrogate, development of treatment plan with patient or surrogate, evaluation of patient's response to treatment, examination of patient, review of old charts, re-evaluation of patient's condition, ordering and review of radiographic studies, ordering and review of laboratory studies and ordering and performing treatments and interventions    I assumed direction of critical care for this patient from another provider in my specialty: no               ED Course  ED Course as of May 03 1651   Sun May 02, 2021   1608 Xray much worse that previous admission    Will require admission - contacted [facility] to discuss abx coverage at this time due to this being his 4th hospitalization in the last month      1707 Pt already ordered abx of HCAP (aztreonam and levaquin)   LACTIC ACID(!!): 3 6                         Initial Sepsis Screening     Row Name 05/02/21 1709                Is the patient's history suggestive of a new or worsening infection? (!) Yes (Proceed)  -VL        Suspected source of infection  pneumonia  -VL        Are two or more of the following signs & symptoms of infection both present and new to the patient? (!) Yes (Proceed)  -VL        Indicate SIRS criteria  Tachypnea > 20 resp per min; Tachycardia > 90 bpm;Hyperthemia > 38 3C (100 9F)  -VL        If the answer is yes to both questions, suspicion of sepsis is present  --        If severe sepsis is present AND tissue hypoperfusion perists in the hour after fluid resuscitation or lactate > 4, the patient meets criteria for SEPTIC SHOCK  --        Are any of the following organ dysfunction criteria present within 6 hours of suspected infection and SIRS criteria that are NOT considered to be chronic conditions?   (!) Yes  -VL        Organ dysfunction  Lactate > 2 0 mmol/L  -VL        Date of presentation of severe sepsis  05/02/21  -        Time of presentation of severe sepsis  1617  -VL        Tissue hypoperfusion persists in the hour after crystalloid fluid administration, evidenced, by either:  --        Was hypotension present within one hour of the conclusion of crystalloid fluid administration?  --        Date of presentation of septic shock  --        Time of presentation of septic shock  --          User Key  (r) = Recorded By, (t) = Taken By, (c) = Cosigned By    [address] Name Provider Type     Shannon Valentine DO Physician                        MDM  Number of Diagnoses or Management Options  HCAP (healthcare-associated pneumonia): new and requires workup  Diagnosis management comments: Recurrent fever, dyspnea, increased oxygen requirement - 4th visit/hospitalization since being diagnosed w/ COVID       Amount and/or Complexity of Data Reviewed  Clinical lab tests: reviewed and ordered  Tests in the radiology section of CPT®: reviewed and ordered  Decide to obtain previous medical records or to obtain history from someone other than the patient: yes  Independent visualization of images, tracings, or specimens: yes        Disposition  Final diagnoses:   HCAP (healthcare-associated pneumonia)     Time reflects when diagnosis was documented in both MDM as applicable and the Disposition within this note     Time User Action Codes Description Comment    5/2/2021  4:15 PM Valery, [facility] [J18 9] HCAP (healthcare-associated pneumonia)     5/3/2021  7:49 AM Mailha Ma Add [J96 01] Acute respiratory failure with hypoxia ([facility] [address] )     5/3/2021  7:49 AM Maliha Ma Add [U07 1,  J12 82] Pneumonia due to COVID-19 virus       ED Disposition     ED Disposition Condition Date/Time Comment    Admit Stable Sun May 2, 2021  4:16 PM Case was discussed with Dr Melissa Looney and the patient's admission status was agreed to be Admission Status: inpatient status to the service of Dr Melissa Looney          Follow-up Information    None         Current Discharge Medication List      CONTINUE these medications which have NOT CHANGED    Details   apixaban (ELIQUIS) 2 5 mg Take 1 tablet (2 5 mg total) by mouth 2 (two) times a day  Qty: 60 tablet, Refills: 0    Associated Diagnoses: Pneumonia due to COVID-19 virus      aspirin 81 MG tablet Take 1 tablet (81 mg total) by mouth daily    Associated Diagnoses: Primary localized osteoarthritis of left knee      atorvastatin (LIPITOR) 40 mg tablet Take 1 tablet (40 mg total) by mouth daily  Qty: 90 tablet, Refills: 3    Associated Diagnoses: Coronary artery disease involving native coronary artery of native heart without angina pectoris      cholecalciferol (VITAMIN D3) 1,000 units tablet Take 2 tablets (2,000 Units total) by mouth daily  Qty: 60 tablet, Refills: 0    Associated Diagnoses: COVID-19      diphenoxylate-atropine (LOMOTIL) 2 5-0 025 mg per tablet Take 1 tablet by mouth 4 (four) times a day as needed for diarrhea  Qty: 30 tablet, Refills: 1    Associated Diagnoses: Change in bowel habit      ferrous sulfate 324 MG TBEC Take 324 mg by mouth      furosemide (LASIX) 20 mg tablet TAKE 1 TABLET BY MOUTH TWICE A DAY  Qty: 180 tablet, Refills: 1    Associated Diagnoses: Chest pain      lisinopril (ZESTRIL) 20 mg tablet TAKE 1 TABLET BY MOUTH EVERY DAY  Qty: 90 tablet, Refills: 1    Associated Diagnoses: Essential hypertension      mesalamine (ROWASA) 4 g Insert 60 mL (4 g total) into the rectum daily at bedtime  Qty: 30 Bottle, Refills: 1    Associated Diagnoses: Change in bowel habit      metFORMIN (GLUCOPHAGE-XR) 500 mg 24 hr tablet TAKE 1 TABLET BY MOUTH EVERY DAY WITH DINNER  Qty: 90 tablet, Refills: 2    Associated Diagnoses: Type 2 diabetes mellitus without complication, without long-term current use of insulin ([facility])      metoprolol succinate (TOPROL-XL) 100 mg 24 hr tablet TAKE 1 TABLET BY MOUTH EVERY DAY  Qty: 90 tablet, Refills: 1    Associated Diagnoses: Coronary artery disease involving native coronary artery of native heart without angina pectoris; Paroxysmal atrial fibrillation ([facility] [address] ); Chronic diastolic congestive heart failure ([facility] ); Essential hypertension      multivitamin-minerals (CENTRUM ADULTS) tablet Take 1 tablet by mouth daily  Qty:  , Refills: 0    Associated Diagnoses: COVID-19      nitroglycerin (NITROSTAT) 0 3 mg SL tablet Nitrostat 0 3 mg sublingual tablet   Place 1 tablet as needed by sublingual route as needed for 90 days  omeprazole (PriLOSEC) 40 MG capsule Take 1 capsule (40 mg total) by mouth daily  Qty: 90 capsule, Refills: 3    Associated Diagnoses: Gastroesophageal reflux disease with esophagitis; Roberts's esophagus without dysplasia; Esophageal dysphagia; Schatzki's ring of distal esophagus      saccharomyces boulardii (FLORASTOR) 250 mg capsule Take 1 capsule (250 mg total) by mouth 2 (two) times a day  Qty: 60 capsule, Refills: 1    Associated Diagnoses: Malignant tumor of cecum (HCC)           No discharge procedures on file      PDMP Review       Value Time User    PDMP Reviewed  Yes 4/10/2021 12:23 PM Benitez Flores DO          ED Provider  Electronically Signed by           Evert Palmer DO  05/03/21 9320

## 2021-05-02 NOTE — ASSESSMENT & PLAN NOTE
· Patient with recent diagnosis of COVID and was on 2 L nasal cannula as outpatient  · Patient got acutely short of breath today morning  · Currently on 4 L nasal canula  · Continue with the supplemental oxygen and wean off as tolerated

## 2021-05-02 NOTE — Clinical Note
Case was discussed with Dr Zulay Ferris and the patient's admission status was agreed to be Admission Status: inpatient status to the service of Dr Zulay Ferris

## 2021-05-02 NOTE — ASSESSMENT & PLAN NOTE
Lab Results   Component Value Date    HGBA1C 5 8 (H) 12/30/2020       No results for input(s): POCGLU in the last 72 hours  Blood Sugar Average: Last 72 hrs:   hold oral hypoglycemic agents    Insulin sliding scale

## 2021-05-02 NOTE — H&P
Otto Macias  H&P- Madi Marisol 1945, 77 y o  male MRN: [id number]  Unit/Bed#: -01 Encounter: [id number]  Primary Care Provider: Daniel Carreno DO   Date and time admitted to hospital: 5/2/2021  2:28 PM    * Pneumonia due to COVID-19 virus  Assessment & Plan  · Patient was diagnosed with COVID on 04/06  · Patient was admitted twice after diagnosis from 4/ 6-4/10 and also from 4/15-4/20   · Patient finished steroids/supportive care  · Came to the hospital with worsening of shortness of breath and chest x-ray was suggestive of worsening of infiltrate  · Given fever possibility of secondary bacterial pneumonia is high, patient will be started on levofloxacin and vancomycin for healthcare associated pneumonia  · Will give 1 time dose of IV Solu-Medrol  · Consult infectious disease  · Continue with the supplemental oxygenation  · Procalcitonin level  · May need pulmonology evaluation if the respiratory status is worsening    Severe sepsis ([address] )  Assessment & Plan  · Secondary to pneumonia  · Patient met the criteria for sepsis with fever and leukocytosis  · Patient also noted to have lactic acidosis and tachypnea  · Repeat lactic acid to ensure resolution  ·     Paroxysmal atrial fibrillation ([address] )  Assessment & Plan  · Patient is on Eliquis as outpatient  · Continue with Eliquis    Lactic acid acidosis  Assessment & Plan  · Trend to ensure resolution    History of aortic valve replacement with bioprosthetic valve  Assessment & Plan  · Supportive care    Chronic diastolic congestive heart failure ([address] )  Assessment & Plan  Wt Readings from Last 3 Encounters:   04/20/21 73 2 kg (161 lb 6 oz)   04/10/21 76 8 kg (169 lb 5 oz)   04/02/21 79 4 kg (175 lb)     Hold diuretics for now  Monitor volume status  Daily weight and intake and output        Acute respiratory failure with hypoxia ([address] )  Assessment & Plan  · Patient with recent diagnosis of COVID and was on 2 L nasal cannula as outpatient  · Patient got acutely short of breath today morning  · Currently on 4 L nasal canula  · Continue with the supplemental oxygen and wean off as tolerated    Dyslipidemia  Assessment & Plan  · Continue statin    Type 2 diabetes mellitus without complication, without long-term current use of insulin ([facility])  Assessment & Plan  Lab Results   Component Value Date    HGBA1C 5 8 (H) 12/30/2020       No results for input(s): POCGLU in the last 72 hours  Blood Sugar Average: Last 72 hrs:   hold oral hypoglycemic agents  Insulin sliding scale    Coronary disease  Assessment & Plan  · Status post CABG  · Continue with the aspirin statin and beta-blocker      VTE Prophylaxis: Apixaban (Eliquis)  / sequential compression device   Code Status: full code  POLST: POLST form is not discussed and not completed at this time  Discussion with family:     Anticipated Length of Stay:  Patient will be admitted on an Inpatient basis with an anticipated length of stay of  > 2 midnights  Justification for Hospital Stay:  Pneumonia secondary to COVID    Total Time for Visit, including Counseling / Coordination of Care: 90 minutes  Greater than 50% of this total time spent on direct patient counseling and coordination of care  Chief Complaint:   Shortness of breath    History of Present Illness:    Alondra Luis is a 77 y o  male who presents with worsening shortness of breath and fever  Patient reported that he was discharged from the hospital on 04/20 and he was doing well up until yesterday  Today morning he woke up with worsening shortness of breath and was febrile and he presented to the hospital   Patient is on 2 L nasal cannula at home since he was discharged from the hospital after the recent admission for COVID  Patient reported that he noted to have worsening of respiratory status  Patient had a chest x-ray which was concerning for worsening infiltrates    Patient was started on antibiotics for healthcare associated pneumonia  Patient met the criteria for sepsis at the time of presentation  Patient was initially diagnosed with COVID on 04/16 and was admitted twice in the hospital   Patient reported that he has been noticing worsening cough with the intermittent phlegm production  Denies any nausea vomiting diarrhea  Denies any change in smell or taste  Review of Systems:    Review of Systems   Constitutional: Positive for activity change, chills, diaphoresis, fatigue and fever  HENT: Negative  Respiratory: Positive for cough and shortness of breath  Cardiovascular: Negative  Gastrointestinal: Negative  Endocrine: Negative  Genitourinary: Negative  Musculoskeletal: Negative  Skin: Negative  Hematological: Negative  Psychiatric/Behavioral: Negative  Past Medical and Surgical History:     Past Medical History:   Diagnosis Date    Arthritis     Atrial fibrillation ([facility] )     Cataract     Colitis     Colon cancer ([facility] )     Diabetes mellitus type II, non insulin dependent ([facility] )     Fatty liver     History of chemotherapy     History of radiation therapy     History of shingles 2018    Hypertension     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     Pneumonia     Skin cancer        Past Surgical History:   Procedure Laterality Date    AORTIC VALVE REPLACEMENT      COLECTOMY      COLONOSCOPY  11/2019    Prior right hemicolectomy    CORONARY ARTERY BYPASS GRAFT      DENTAL SURGERY      KNEE SURGERY      LYMPHADENECTOMY      OTHER SURGICAL HISTORY      MAZE PROCEDURE    NM TOTAL KNEE ARTHROPLASTY Left 1/28/2019    Procedure: ARTHROPLASTY LEFT KNEE TOTAL;  Surgeon: Chiquis Ortiz MD;  Location: [facility] OR;  Service: Orthopedics    SKIN BIOPSY      UPPER GASTROINTESTINAL ENDOSCOPY  11/2019    Schatzki ring       Meds/Allergies:    Prior to Admission medications    Medication Sig Start Date End Date Taking?  Authorizing Provider   apixaban (ELIQUIS) 2 5 mg Take 1 tablet (2 5 mg total) by mouth 2 (two) times a day 4/20/21 5/20/21  Vika Holman MD   aspirin 81 MG tablet Take 1 tablet (81 mg total) by mouth daily 3/29/19   Emir Mcgarry MD   atorvastatin (LIPITOR) 40 mg tablet Take 1 tablet (40 mg total) by mouth daily 9/2/20   Emir Mcgarry MD   cholecalciferol (VITAMIN D3) 1,000 units tablet Take 2 tablets (2,000 Units total) by mouth daily 4/11/21   Magaly ,    diphenoxylate-atropine (LOMOTIL) 2 5-0 025 mg per tablet Take 1 tablet by mouth 4 (four) times a day as needed for diarrhea 3/5/21   DUNG Medrano   ferrous sulfate 324 MG TBEC Take 324 mg by mouth 1/4/19   Historical Provider, MD   furosemide (LASIX) 20 mg tablet TAKE 1 TABLET BY MOUTH TWICE A DAY 1/3/21   Radha Pettit DO   lisinopril (ZESTRIL) 20 mg tablet TAKE 1 TABLET BY MOUTH EVERY DAY 2/17/21   Daniel Carreno DO   mesalamine (ROWASA) 4 g Insert 60 mL (4 g total) into the rectum daily at bedtime 3/5/21   DUNG Medrano   metFORMIN (GLUCOPHAGE-XR) 500 mg 24 hr tablet TAKE 1 TABLET BY MOUTH EVERY DAY WITH DINNER 3/14/21   Daniel Carreno DO   metoprolol succinate (TOPROL-XL) 100 mg 24 hr tablet TAKE 1 TABLET BY MOUTH EVERY DAY 4/2/21   Daniel Carreno DO   multivitamin-minerals (CENTRUM ADULTS) tablet Take 1 tablet by mouth daily 4/13/21   Happy , DO   nitroglycerin (NITROSTAT) 0 3 mg SL tablet Nitrostat 0 3 mg sublingual tablet   Place 1 tablet as needed by sublingual route as needed for 90 days  Historical Provider, MD   omeprazole (PriLOSEC) 40 MG capsule Take 1 capsule (40 mg total) by mouth daily 1/20/21   DUNG Medrano   saccharomyces boulardii (FLORASTOR) 250 mg capsule Take 1 capsule (250 mg total) by mouth 2 (two) times a day 4/10/21   Magaly Menjivar, DO     I have reviewed home medications with a medical source (PCP, Pharmacy, other)  Allergies:    Allergies   Allergen Reactions    Ceftin [Cefuroxime] Itching     However, has tolerated Cefazolin and Pip-Tazo since, which have different side chains  Be cautious with / avoid 2nd, 3rd, or 4th Gen Cephs that have similar side chains to Cefuroxime  Social History:     Marital Status: /Civil Union   Occupation:   Patient Pre-hospital Living Situation:  Lives at home with family  Patient Pre-hospital Level of Mobility:  Independent  Patient Pre-hospital Diet Restrictions:   Substance Use History:   Social History     Substance and Sexual Activity   Alcohol Use Yes    Frequency: Monthly or less    Drinks per session: 1 or 2    Binge frequency: Never    Comment: very rare "beer here and there"     Social History     Tobacco Use   Smoking Status Former Smoker    Packs/day: 1 00    Years: 40 00    Pack years: 40 00    Types: Cigarettes    Quit date:     Years since quittin 3   Smokeless Tobacco Never Used     Social History     Substance and Sexual Activity   Drug Use No       Family History:    Family History   Problem Relation Age of Onset    Heart block Family     Coronary artery disease Mother     Thrombosis Mother     Hypertension Mother     Arthritis Mother     Hyperlipidemia Mother     Diabetes Father     Hypertension Father     Arthritis Father     Sudden death Father         cardiac    Colon cancer Paternal Grandfather     Breast cancer Sister     Heart disease Brother         Coronary stents       Physical Exam:     Vitals:   Blood Pressure: 146/69 (21 1600)  Pulse: 94 (21 1600)  Temperature: (!) 102 8 °F (39 3 °C) (21 1456)  Temp Source: Oral (21 1456)  Respirations: (!) 32 (21 1600)  SpO2: 96 % (21 1600)    Physical Exam  Constitutional:       General: He is not in acute distress  Appearance: Normal appearance  HENT:      Head: Normocephalic and atraumatic  Nose: Nose normal    Eyes:      General: No scleral icterus  Cardiovascular:      Rate and Rhythm: Tachycardia present  Heart sounds: Murmur present  Pulmonary:      Comments: Tachypnea  Decreased breath sounds bilateral  Bilateral coarse breath sounds ,rhonchi present  Abdominal:      General: Abdomen is flat  There is no distension  Musculoskeletal: Normal range of motion  Skin:     General: Skin is warm  Neurological:      General: No focal deficit present  Mental Status: He is alert  Cranial Nerves: No cranial nerve deficit  Additional Data:     Lab Results: I have personally reviewed pertinent reports  Results from last 7 days   Lab Units 05/02/21  1518   WBC Thousand/uL 11 21*   HEMOGLOBIN g/dL 8 7*   HEMATOCRIT % 27 9*   PLATELETS Thousands/uL 216   NEUTROS PCT % 83*   LYMPHS PCT % 5*   MONOS PCT % 9   EOS PCT % 2     Results from last 7 days   Lab Units 05/02/21  1518   SODIUM mmol/L 139   POTASSIUM mmol/L 3 9   CHLORIDE mmol/L 99*   CO2 mmol/L 30   BUN mg/dL 12   CREATININE mg/dL 1 03   ANION GAP mmol/L 10   CALCIUM mg/dL 8 3   ALBUMIN g/dL 2 3*   TOTAL BILIRUBIN mg/dL 0 80   ALK PHOS U/L 126*   ALT U/L 23   AST U/L 14   GLUCOSE RANDOM mg/dL 164*     Results from last 7 days   Lab Units 05/02/21  1518   INR  1 66*             Results from last 7 days   Lab Units 05/02/21  1518   LACTIC ACID mmol/L 3 6*       Imaging: I have personally reviewed pertinent reports  XR chest 1 view portable   Final Result by Christel Dutta MD (05/02 1527)      Very extensive, mildly progressed bilateral opacities, which in the appropriate clinical setting, are consistent with Covid 19 pneumonia  The study was marked in EPIC for significant notification  Workstation performed: [id number]             EKG, Pathology, and Other Studies Reviewed on Admission:   · EKG:     Allscripts / Epic Records Reviewed: Yes     ** Please Note: This note has been constructed using a voice recognition system   **

## 2021-05-02 NOTE — ASSESSMENT & PLAN NOTE
· Patient was diagnosed with COVID on 04/06  · Patient was admitted twice after diagnosis from 4/ 6-4/10 and also from 4/15-4/20   · Patient finished steroids/supportive care  · Came to the hospital with worsening of shortness of breath and chest x-ray was suggestive of worsening of infiltrate  · Given fever possibility of secondary bacterial pneumonia is high, patient will be started on levofloxacin and vancomycin for healthcare associated pneumonia  · Will give 1 time dose of IV Solu-Medrol  · Consult infectious disease  · Continue with the supplemental oxygenation  · Procalcitonin level  · May need pulmonology evaluation if the respiratory status is worsening

## 2021-05-03 PROBLEM — E87.2 LACTIC ACID ACIDOSIS: Status: RESOLVED | Noted: 2019-02-27 | Resolved: 2021-05-03

## 2021-05-03 PROBLEM — E87.20 LACTIC ACID ACIDOSIS: Status: RESOLVED | Noted: 2019-02-27 | Resolved: 2021-05-03

## 2021-05-03 LAB
ALBUMIN SERPL BCP-MCNC: 1.9 G/DL (ref 3.5–5)
ALP SERPL-CCNC: 118 U/L (ref 46–116)
ALT SERPL W P-5'-P-CCNC: 23 U/L (ref 12–78)
ANION GAP SERPL CALCULATED.3IONS-SCNC: 11 MMOL/L (ref 4–13)
AST SERPL W P-5'-P-CCNC: 14 U/L (ref 5–45)
ATRIAL RATE: 63 BPM
ATRIAL RATE: 83 BPM
BILIRUB SERPL-MCNC: 0.8 MG/DL (ref 0.2–1)
BUN SERPL-MCNC: 17 MG/DL (ref 5–25)
CALCIUM ALBUM COR SERPL-MCNC: 9.9 MG/DL (ref 8.3–10.1)
CALCIUM SERPL-MCNC: 8.2 MG/DL (ref 8.3–10.1)
CHLORIDE SERPL-SCNC: 100 MMOL/L (ref 100–108)
CO2 SERPL-SCNC: 27 MMOL/L (ref 21–32)
CREAT SERPL-MCNC: 0.99 MG/DL (ref 0.6–1.3)
ERYTHROCYTE [DISTWIDTH] IN BLOOD BY AUTOMATED COUNT: 19.1 % (ref 11.6–15.1)
GFR SERPL CREATININE-BSD FRML MDRD: 75 ML/MIN/1.73SQ M
GLUCOSE SERPL-MCNC: 259 MG/DL (ref 65–140)
GLUCOSE SERPL-MCNC: 272 MG/DL (ref 65–140)
GLUCOSE SERPL-MCNC: 273 MG/DL (ref 65–140)
GLUCOSE SERPL-MCNC: 303 MG/DL (ref 65–140)
GLUCOSE SERPL-MCNC: 326 MG/DL (ref 65–140)
HCT VFR BLD AUTO: 28.4 % (ref 36.5–49.3)
HGB BLD-MCNC: 8.7 G/DL (ref 12–17)
L PNEUMO1 AG UR QL IA.RAPID: NEGATIVE
MCH RBC QN AUTO: 24.6 PG (ref 26.8–34.3)
MCHC RBC AUTO-ENTMCNC: 30.6 G/DL (ref 31.4–37.4)
MCV RBC AUTO: 81 FL (ref 82–98)
NT-PROBNP SERPL-MCNC: ABNORMAL PG/ML
P AXIS: 81 DEGREES
POTASSIUM SERPL-SCNC: 3.6 MMOL/L (ref 3.5–5.3)
PR INTERVAL: 174 MS
PROCALCITONIN SERPL-MCNC: 0.17 NG/ML
PROT SERPL-MCNC: 5.6 G/DL (ref 6.4–8.2)
QRS AXIS: -29 DEGREES
QRS AXIS: -31 DEGREES
QRSD INTERVAL: 124 MS
QRSD INTERVAL: 126 MS
QT INTERVAL: 434 MS
QT INTERVAL: 444 MS
QTC INTERVAL: 509 MS
QTC INTERVAL: 521 MS
RBC # BLD AUTO: 3.53 MILLION/UL (ref 3.88–5.62)
S PNEUM AG UR QL: NEGATIVE
SODIUM SERPL-SCNC: 138 MMOL/L (ref 136–145)
T WAVE AXIS: 75 DEGREES
T WAVE AXIS: 75 DEGREES
VENTRICULAR RATE: 83 BPM
VENTRICULAR RATE: 83 BPM
WBC # BLD AUTO: 5.83 THOUSAND/UL (ref 4.31–10.16)

## 2021-05-03 PROCEDURE — 84145 PROCALCITONIN (PCT): CPT | Performed by: EMERGENCY MEDICINE

## 2021-05-03 PROCEDURE — 93010 ELECTROCARDIOGRAM REPORT: CPT | Performed by: INTERNAL MEDICINE

## 2021-05-03 PROCEDURE — 82948 REAGENT STRIP/BLOOD GLUCOSE: CPT

## 2021-05-03 PROCEDURE — 85027 COMPLETE CBC AUTOMATED: CPT | Performed by: FAMILY MEDICINE

## 2021-05-03 PROCEDURE — 83880 ASSAY OF NATRIURETIC PEPTIDE: CPT | Performed by: PHYSICIAN ASSISTANT

## 2021-05-03 PROCEDURE — 99223 1ST HOSP IP/OBS HIGH 75: CPT | Performed by: INTERNAL MEDICINE

## 2021-05-03 PROCEDURE — 4010F ACE/ARB THERAPY RXD/TAKEN: CPT | Performed by: INTERNAL MEDICINE

## 2021-05-03 PROCEDURE — 87449 NOS EACH ORGANISM AG IA: CPT | Performed by: FAMILY MEDICINE

## 2021-05-03 PROCEDURE — 99232 SBSQ HOSP IP/OBS MODERATE 35: CPT | Performed by: PHYSICIAN ASSISTANT

## 2021-05-03 PROCEDURE — 80053 COMPREHEN METABOLIC PANEL: CPT | Performed by: FAMILY MEDICINE

## 2021-05-03 RX ORDER — ALBUTEROL SULFATE 90 UG/1
2 AEROSOL, METERED RESPIRATORY (INHALATION)
Status: DISCONTINUED | OUTPATIENT
Start: 2021-05-03 | End: 2021-05-12

## 2021-05-03 RX ADMIN — VANCOMYCIN HYDROCHLORIDE 1000 MG: 1 INJECTION, SOLUTION INTRAVENOUS at 17:24

## 2021-05-03 RX ADMIN — ASPIRIN 81 MG CHEWABLE TABLET 81 MG: 81 TABLET CHEWABLE at 08:22

## 2021-05-03 RX ADMIN — ALBUTEROL SULFATE 2 PUFF: 90 AEROSOL, METERED RESPIRATORY (INHALATION) at 15:46

## 2021-05-03 RX ADMIN — INSULIN LISPRO 3 UNITS: 100 INJECTION, SOLUTION INTRAVENOUS; SUBCUTANEOUS at 12:07

## 2021-05-03 RX ADMIN — Medication 2000 UNITS: at 08:22

## 2021-05-03 RX ADMIN — INSULIN LISPRO 2 UNITS: 100 INJECTION, SOLUTION INTRAVENOUS; SUBCUTANEOUS at 22:12

## 2021-05-03 RX ADMIN — ATORVASTATIN CALCIUM 40 MG: 40 TABLET, FILM COATED ORAL at 15:46

## 2021-05-03 RX ADMIN — INSULIN LISPRO 3 UNITS: 100 INJECTION, SOLUTION INTRAVENOUS; SUBCUTANEOUS at 08:22

## 2021-05-03 RX ADMIN — LEVOFLOXACIN 750 MG: 5 INJECTION, SOLUTION INTRAVENOUS at 15:46

## 2021-05-03 RX ADMIN — FERROUS SULFATE TAB 325 MG (65 MG ELEMENTAL FE) 325 MG: 325 (65 FE) TAB at 07:18

## 2021-05-03 RX ADMIN — Medication 250 MG: at 08:22

## 2021-05-03 RX ADMIN — INSULIN LISPRO 3 UNITS: 100 INJECTION, SOLUTION INTRAVENOUS; SUBCUTANEOUS at 17:01

## 2021-05-03 RX ADMIN — LISINOPRIL 20 MG: 20 TABLET ORAL at 08:22

## 2021-05-03 RX ADMIN — Medication 250 MG: at 17:24

## 2021-05-03 RX ADMIN — VANCOMYCIN HYDROCHLORIDE 1000 MG: 1 INJECTION, SOLUTION INTRAVENOUS at 05:30

## 2021-05-03 RX ADMIN — ALBUTEROL SULFATE 2 PUFF: 90 AEROSOL, METERED RESPIRATORY (INHALATION) at 20:05

## 2021-05-03 RX ADMIN — APIXABAN 2.5 MG: 2.5 TABLET, FILM COATED ORAL at 08:22

## 2021-05-03 RX ADMIN — MULTIPLE VITAMINS W/ MINERALS TAB 1 TABLET: TAB ORAL at 08:22

## 2021-05-03 RX ADMIN — APIXABAN 2.5 MG: 2.5 TABLET, FILM COATED ORAL at 17:24

## 2021-05-03 RX ADMIN — METOPROLOL SUCCINATE 100 MG: 50 TABLET, EXTENDED RELEASE ORAL at 08:22

## 2021-05-03 RX ADMIN — PANTOPRAZOLE SODIUM 40 MG: 40 TABLET, DELAYED RELEASE ORAL at 05:32

## 2021-05-03 NOTE — UTILIZATION REVIEW
Initial Clinical Review    Admission: Date/Time/Statement:   Admission Orders (From admission, onward)     Ordered        05/02/21 1616  Inpatient Admission  Once                   Orders Placed This Encounter   Procedures    Inpatient Admission     Standing Status:   Standing     Number of Occurrences:   1     Order Specific Question:   Level of Care     Answer:   Med Surg [16]     Order Specific Question:   Estimated length of stay     Answer:   More than 2 Midnights     Order Specific Question:   Certification     Answer:   I certify that inpatient services are medically necessary for this patient for a duration of greater than two midnights  See H&P and MD Progress Notes for additional information about the patient's course of treatment  ED Arrival Information     Expected Arrival Acuity Means of Arrival Escorted By Service Admission Type    - 5/2/2021 14:24 Emergent Wheelchair Spouse Hospitalist Emergency    Arrival Complaint    sob positive for covid sincd 4/30/21        Chief Complaint   Patient presents with    Shortness of Breath     pt reports having covid on and off for a couple weeks  was gertting better, and then woke up today felling short of breath again  reports fever at home of 103       Initial Presentation:     77year old male presents to ed from home for evaluation and treatment of worsening shortness of breath associated with fever and Covid positive diagnosis  Baseline O2  2L nc  PMHX:  ADMITTED FROM 4-6 TO 4/10 AND 4/15 -4/20 FOR since COVID on 4/6  Clinical assessment significant for fever  102, tachycardia, tachypnea, wbc 11 21, lactic acid 3 6, chest x ray shows extensive bilateral opacities consistent with covid 19 pneumonia  Treated in ed  with supplemental oxygen iv levaquin, iv aztreonam  Admit to inpatient for pneumonia covid 19  Plan includes : iv vancomycin and iv levaquin, isolation, supplemental oxygen, consult infectious disease, kv dexamethasone x1            Date: 5-3   Day 2: inpatient  Med surg  Patient with deteriorating respiratory status requiring increase in oxygen from 4 to 5L nasal cannula  Continue iv antibiotics  Obtain ECHO, strep pneumoniae, legionella and repeat chest x ray  Follow up blood and sputum cultures  Consult pulmonary    · Acute hypoxic respiratory failure              - patient was recently hospitalized with COVID-19, discharged home on 2 liters/minute  He is currently requiring 5 liters/minutes to maintain saturations above 90%  His pattern is not entirely typical for this be related to COVID 19, suspect some other superimposed process     · Abnormal chest CT with diffuse ground-glass opacities, septal thickening and bilateral pleural effusions              - differential diagnosis includes pulmonary edema, persistent viral pneumonia and/or superimposed bacterial pneumonia  Arguing against bacterial process is normal procalcitonin level  Follow up repeat done today  Broad-spectrum antibiotics pending final culture data  Currently on Levaquin and vancomycin  Again, I do not think this is typical for COVID  There is no evidence of progressive fibrotic change fortunately    Evaluate potential for cardiac contribution as below           ED Triage Vitals   05/02/21 1439 05/02/21 1439 05/02/21 1439 05/02/21 1441 05/02/21 1439   99 7 °F (37 6 °C) (!) 115 22 (!) 193/88 (!) 77 %      Temporal Monitor           No Pain          05/03/21 75 2 kg (165 lb 12 6 oz)     Additional Vital Signs:       Date/  Time  Temp  Pulse  Resp  BP   SpO2   Nasal Cannula O2 Flow Rate (L/min)  O2 Device    05/03/21 0900  --  --  --  --   --   5 L/min  Nasal cannula    05/03/21 0825  --  --  --  --   --   5 L/min  Nasal cannula    05/03/21 07:07:10  97 7 °F (36 5 °C)  74  17  130/66   88 %Abnormal    --  --    05/02/21 20:52:40  97 8 °F (36 6 °C)  76  --  128/66   89 %Abnormal    --  --    05/02/21 2000  --  --  --  --   90 %   4 L/min  Nasal cannula    05/02/21 18:21:39  98 7 °F (37 1 °C)  79  --  114/57   95 %   --  --    05/02/21 18:17:31  98 7 °F (37 1 °C)  84  18  114/57   92 %   --  --    05/02/21 1600  --  94  32  Abnormal   146/69   96 %   4 L/min  Nasal cannula    05/02/21 1456  102 8 °F (39 3 °C)  Abnormal   --  --  --   --   --  --    05/02/21 1443  --  --  --  --   94 %   5 L/min  Nasal cannula    05/02/21 1441  --  --  22  193/88  Abnormal    92 %   3 L/min  Nasal cannula                Pertinent Labs/Diagnostic Test Results:     XR chest 1 view portable    (05/02 1527)      Very extensive, mildly progressed bilateral opacities, which in the appropriate clinical setting, are consistent with Covid 19 pneumonia              Results from last 7 days   Lab Units 05/03/21  0536 05/02/21  1518   WBC Thousand/uL 5 83 11 21*   HEMOGLOBIN g/dL 8 7* 8 7*   HEMATOCRIT % 28 4* 27 9*   PLATELETS Thousands/uL  --  216   NEUTROS ABS Thousands/µL  --  9 33*         Results from last 7 days   Lab Units 05/03/21  0536 05/02/21  1518   SODIUM mmol/L 138 139   POTASSIUM mmol/L 3 6 3 9   CHLORIDE mmol/L 100 99*   CO2 mmol/L 27 30   ANION GAP mmol/L 11 10   BUN mg/dL 17 12   CREATININE mg/dL 0 99 1 03   EGFR ml/min/1 73sq m 75 71   CALCIUM mg/dL 8 2* 8 3     Results from last 7 days   Lab Units 05/03/21  0536 05/02/21  1518   AST U/L 14 14   ALT U/L 23 23   ALK PHOS U/L 118* 126*   TOTAL PROTEIN g/dL 5 6* 6 1*   ALBUMIN g/dL 1 9* 2 3*   TOTAL BILIRUBIN mg/dL 0 80 0 80     Results from last 7 days   Lab Units 05/03/21  1120 05/03/21  0704 05/02/21 2050 05/02/21  1836   POC GLUCOSE mg/dl 303* 272* 196* 165*     Results from last 7 days   Lab Units 05/03/21  0536 05/02/21  1518   GLUCOSE RANDOM mg/dL 273* 164*       Results from last 7 days   Lab Units 05/02/21  1518   PROTIME seconds 19 5*   INR  1 66*   PTT seconds 32         Results from last 7 days   Lab Units 05/03/21  0536 05/02/21  1825 05/02/21  1518   PROCALCITONIN ng/ml 0 17 0 21 0 18     Results from last 7 days   Lab Units 05/02/21 2048 05/02/21  1825 05/02/21  1518   LACTIC ACID mmol/L 2 0 2 7* 3 6*       Results from last 7 days   Lab Units 05/02/21  1518   BLOOD CULTURE  Received in Microbiology Lab  Culture in Progress  Received in Microbiology Lab  Culture in Progress         ED Treatment:   Medication Administration from 05/02/2021 1424 to 05/02/2021 1730       Date/Time Order Dose Route Action     05/02/2021 1643 acetaminophen (TYLENOL) tablet 975 mg 975 mg Oral Given     05/02/2021 1659 aztreonam (AZACTAM) 2,000 mg in sodium chloride 0 9 % 100 mL IVPB 2,000 mg Intravenous New Bag     05/02/2021 1639 levofloxacin (LEVAQUIN) IVPB (premix in dextrose) 750 mg 150 mL 750 mg Intravenous New Bag        Past Medical History:   Diagnosis Date    Arthritis     [facility]     Cataract     Colitis     Colon cancer ([facility]     Diabetes mellitus type II, non insulin dependent [facility]     Fatty liver     History of chemotherapy     History of radiation therapy     History of shingles 2018    Hypertension     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     Pneumonia     Skin cancer      Present on Admission:   Acute respiratory failure with hypoxia (HCC)   Chronic diastolic congestive heart failure (HCC)   Dyslipidemia   Paroxysmal atrial fibrillation (HCC)   Coronary disease   Type 2 diabetes mellitus without complication, without long-term current use of insulin (HCC)   Severe sepsis (HCC)   Pneumonia due to COVID-19 virus      Admitting Diagnosis:     Shortness of breath [R06 02]  HCAP (healthcare-associated pneumonia) [J18 9]    Age/Sex: 77 y o  male  Admission Orders:    albuterol, 2 puff, Inhalation, TID  apixaban, 2 5 mg, Oral, BID  aspirin, 81 mg, Oral, Daily  atorvastatin, 40 mg, Oral, Daily With Dinner  cholecalciferol, 2,000 Units, Oral, Daily  ferrous sulfate, 325 mg, Oral, Daily With Breakfast  insulin lispro, 1-5 Units, Subcutaneous, TID AC  insulin lispro, 1-5 Units, Subcutaneous, HS  levofloxacin, 750 mg, Intravenous, Q24H  lisinopril, 20 mg, Oral, Daily  metoprolol succinate, 100 mg, Oral, Daily  multivitamin-minerals, 1 tablet, Oral, Daily  pantoprazole, 40 mg, Oral, Early Morning  saccharomyces boulardii, 250 mg, Oral, BID  vancomycin, 12 5 mg/kg, Intravenous, Q12H      Continuous IV Infusions:     PRN Meds:  acetaminophen, 650 mg, Oral, Q6H PRN  nitroglycerin, 0 4 mg, Sublingual, Q5 Min PRN  ondansetron, 4 mg, Intravenous, Q6H PRN        IP CONSULT TO PHARMACY  IP CONSULT TO INFECTIOUS DISEASES  IP CONSULT TO PULMONOLOGY    Network Utilization Review Department  ATTENTION: Please call with any questions or concerns to [phone] and carefully listen to the prompts so that you are directed to the right person  All voicemails are confidential   Tony Safe all requests for admission clinical reviews, approved or denied determinations and any other requests to dedicated fax number below belonging to the campus where the patient is receiving treatment   List of dedicated fax numbers for the Facilities:  [address] DENIALS (Administrative/Medical Necessity) [phone]   [address] (Maternity/NICU/Pediatrics) [phone]   [address] Aureliano Barakat [phone] [place] [address] [address] [facility] [phone]

## 2021-05-03 NOTE — CASE MANAGEMENT
LOS: 1 day  PATIENT IS NOT A MEDICARE BUNDLE  HE IS A 30 DAY READMISSION  HE HAS BEEN HERE FROM 4/6-4/10/21 AND 4/15-4/20/21 FOR COVID  HE IS ADMITTED NOW WITH WORSENING SOB AND FEVER WITH WORSENING INFILTRATES ON CXR  UNPLANNED READMISSION RISK SCORE IS 30 - YELLOW  Spoke with patient via monitor as per COVID protocol  Explained role of care management  Patient lives with spouse, Vero Brown and sister in law in a [address] [place] [address] with first floor set up  He is independent adl's and ambulation  Spouse transports and provides meals  DME - home O2 at 2l nc supplied by [employer]  Past services - current with [facility]  Denies history of SNF, MH or D&A  PCP is Miguel A Albrecht  He does not have a POA, has an AD  Pharmacy of choice is [facility] in [facility]  He has a prescription plan and is able to afford his medication  He plans on returning home at discharge and is agreeable to continuing with [facility]  Referral via [facility] to [facility]  Spouse to transport home  CM reviewed d/c planning process including the following: identifying help at home, patient preference for d/c planning needs, availability of treatment team to discuss questions or concerns patient and/or family may have regarding understanding medications and recognizing signs and symptoms once discharged  CM also encouraged patient to follow up with all recommended appointments after discharge  Patient advised of importance for patient and family to participate in managing patients medical well being

## 2021-05-03 NOTE — PLAN OF CARE
Problem: RESPIRATORY - ADULT  Goal: Achieves optimal ventilation and oxygenation  Description: INTERVENTIONS:  - Assess for changes in respiratory status  - Assess for changes in mentation and behavior  - Position to facilitate oxygenation and minimize respiratory effort  - Oxygen administered by appropriate delivery if ordered  - Initiate smoking cessation education as indicated  - Encourage broncho-pulmonary hygiene including cough, deep breathe, Incentive Spirometry  - Assess the need for suctioning and aspirate as needed  - Assess and instruct to report SOB or any respiratory difficulty  - Respiratory Therapy support as indicated  Outcome: Progressing     Problem: DISCHARGE PLANNING  Goal: Discharge to home or other facility with appropriate resources  Description: INTERVENTIONS:  - Identify barriers to discharge w/patient and caregiver  - Arrange for needed discharge resources and transportation as appropriate  - Identify discharge learning needs (meds, wound care, etc )  - Arrange for interpretive services to assist at discharge as needed  - Refer to Case Management Department for coordinating discharge planning if the patient needs post-hospital services based on physician/advanced practitioner order or complex needs related to functional status, cognitive ability, or social support system  Outcome: Progressing     Problem: Knowledge Deficit  Goal: Patient/family/caregiver demonstrates understanding of disease process, treatment plan, medications, and discharge instructions  Description: Complete learning assessment and assess knowledge base    Interventions:  - Provide teaching at level of understanding  - Provide teaching via preferred learning methods  Outcome: Progressing

## 2021-05-03 NOTE — PLAN OF CARE
Problem: RESPIRATORY - ADULT  Goal: Achieves optimal ventilation and oxygenation  Description: INTERVENTIONS:  - Assess for changes in respiratory status  - Assess for changes in mentation and behavior  - Position to facilitate oxygenation and minimize respiratory effort  - Oxygen administered by appropriate delivery if ordered  - Initiate smoking cessation education as indicated  - Encourage broncho-pulmonary hygiene including cough, deep breathe, Incentive Spirometry  - Assess the need for suctioning and aspirate as needed  - Assess and instruct to report SOB or any respiratory difficulty  - Respiratory Therapy support as indicated  Outcome: Progressing     Problem: DISCHARGE PLANNING  Goal: Discharge to home or other facility with appropriate resources  Description: INTERVENTIONS:  - Identify barriers to discharge w/patient and caregiver  - Arrange for needed discharge resources and transportation as appropriate  - Identify discharge learning needs (meds, wound care, etc )  - Arrange for interpretive services to assist at discharge as needed  - Refer to Case Management Department for coordinating discharge planning if the patient needs post-hospital services based on physician/advanced practitioner order or complex needs related to functional status, cognitive ability, or social support system  Outcome: Progressing     Problem: Knowledge Deficit  Goal: Patient/family/caregiver demonstrates understanding of disease process, treatment plan, medications, and discharge instructions  Description: Complete learning assessment and assess knowledge base    Interventions:  - Provide teaching at level of understanding  - Provide teaching via preferred learning methods  Outcome: Progressing     Problem: PAIN - ADULT  Goal: Verbalizes/displays adequate comfort level or baseline comfort level  Description: Interventions:  - Encourage patient to monitor pain and request assistance  - Assess pain using appropriate pain scale  - Administer analgesics based on type and severity of pain and evaluate response  - Implement non-pharmacological measures as appropriate and evaluate response  - Consider cultural and social influences on pain and pain management  - Notify physician/advanced practitioner if interventions unsuccessful or patient reports new pain  Outcome: Progressing     Problem: INFECTION - ADULT  Goal: Absence or prevention of progression during hospitalization  Description: INTERVENTIONS:  - Assess and monitor for signs and symptoms of infection  - Monitor lab/diagnostic results  - Monitor all insertion sites, i e  indwelling lines, tubes, and drains  - Monitor endotracheal if appropriate and nasal secretions for changes in amount and color  - [place] appropriate cooling/warming therapies per order  - Administer medications as ordered  - Instruct and encourage patient and family to use good hand hygiene technique  - Identify and instruct in appropriate isolation precautions for identified infection/condition  Outcome: Progressing  Goal: Absence of fever/infection during neutropenic period  Description: INTERVENTIONS:  - Monitor WBC    Outcome: Progressing     Problem: SAFETY ADULT  Goal: Patient will remain free of falls  Description: INTERVENTIONS:  - Assess patient frequently for physical needs  -  Identify cognitive and physical deficits and behaviors that affect risk of falls    -  [place] fall precautions as indicated by assessment   - Educate patient/family on patient safety including physical limitations  - Instruct patient to call for assistance with activity based on assessment  - Modify environment to reduce risk of injury  - Consider OT/PT consult to assist with strengthening/mobility  Outcome: Progressing  Goal: Maintain or return to baseline ADL function  Description: INTERVENTIONS:  -  Assess patient's ability to carry out ADLs; assess patient's baseline for ADL function and identify physical deficits which impact ability to perform ADLs (bathing, care of mouth/teeth, toileting, grooming, dressing, etc )  - Assess/evaluate cause of self-care deficits   - Assess range of motion  - Assess patient's mobility; develop plan if impaired  - Assess patient's need for assistive devices and provide as appropriate  - Encourage maximum independence but intervene and supervise when necessary  - Involve family in performance of ADLs  - Assess for home care needs following discharge   - Consider OT consult to assist with ADL evaluation and planning for discharge  - Provide patient education as appropriate  Outcome: Progressing  Goal: Maintain or return mobility status to optimal level  Description: INTERVENTIONS:  - Assess patient's baseline mobility status (ambulation, transfers, stairs, etc )    - Identify cognitive and physical deficits and behaviors that affect mobility  - Identify mobility aids required to assist with transfers and/or ambulation (gait belt, sit-to-stand, lift, walker, cane, etc )  - [place] fall precautions as indicated by assessment  - Record patient progress and toleration of activity level on Mobility SBAR; progress patient to next Phase/Stage  - Instruct patient to call for assistance with activity based on assessment  - Consider rehabilitation consult to assist with strengthening/weightbearing, etc   Outcome: Progressing

## 2021-05-03 NOTE — ASSESSMENT & PLAN NOTE
Lab Results   Component Value Date    HGBA1C 5 8 (H) 12/30/2020       Recent Labs     05/02/21  1836 05/02/21 2050 05/03/21  0704 05/03/21  1120   POCGLU 165* 196* 272* 303*       Blood Sugar Average: Last 72 hrs:  (P) 234   · Hold oral agents while inpatient  · SSI + accuchek  · Diabetic diet

## 2021-05-03 NOTE — PROGRESS NOTES
Vancomycin Assessment    Ericka Ortiz is a 77 y o  male who is currently receiving vancomycin 1000 mg Q12H for Pneumonia     Relevant clinical data and objective history reviewed:  Creatinine   Date Value Ref Range Status   05/02/2021 1 03 0 60 - 1 30 mg/dL Final     Comment:     Standardized to IDMS reference method   04/20/2021 0 88 0 60 - 1 30 mg/dL Final     Comment:     Standardized to IDMS reference method   04/19/2021 0 85 0 60 - 1 30 mg/dL Final     Comment:     Standardized to IDMS reference method   05/19/2017 0 93 0 76 - 1 27 mg/dL Final   02/05/2016 0 99 0 76 - 1 27 mg/dL Final   02/22/2014 0 91 0 60 - 1 30 mg/dL Final     Comment:     Standardized to IDMS reference method     /66   Pulse 76   Temp 97 8 °F (36 6 °C)   Resp 18   SpO2 (!) 89%   No intake/output data recorded  Lab Results   Component Value Date/Time    BUN 12 05/02/2021 03:18 PM    BUN 14 03/12/2021 11:39 AM    WBC 11 21 (H) 05/02/2021 03:18 PM    WBC 6 7 05/19/2017 07:20 AM    HGB 8 7 (L) 05/02/2021 03:18 PM    HGB 14 5 05/19/2017 07:20 AM    HCT 27 9 (L) 05/02/2021 03:18 PM    HCT 42 9 05/19/2017 07:20 AM    MCV 80 (L) 05/02/2021 03:18 PM    MCV 85 05/19/2017 07:20 AM     05/02/2021 03:18 PM     05/19/2017 07:20 AM     Temp Readings from Last 3 Encounters:   05/02/21 97 8 °F (36 6 °C)   04/20/21 (!) 97 °F (36 1 °C)   04/10/21 (!) 97 4 °F (36 3 °C)     Vancomycin Days of Therapy: 1    Assessment/Plan  The patient is currently on vancomycin utilizing scheduled dosing based on adjusted body weight (due to obesity)  Baseline risks associated with therapy include: concomitant nephrotoxic medications  The patient is currently receiving 1000 mg Q12H and is clinically appropriate and dose will be continued  Pharmacy will also follow closely for s/sx of nephrotoxicity, infusion reactions, and appropriateness of therapy  BMP and CBC will be ordered per protocol    Plan for trough as patient approaches steady state, prior to the 5th  dose at approximately at 17:30 on 05/04/21   Due to infection severity, will target a trough of 15-20 (appropriate for most indications)   Pharmacy will continue to follow the patients culture results and clinical progress daily      Shara Spatz, Pharmacist

## 2021-05-03 NOTE — ASSESSMENT & PLAN NOTE
Wt Readings from Last 3 Encounters:   05/03/21 75 2 kg (165 lb 12 6 oz)   04/20/21 73 2 kg (161 lb 6 oz)   04/10/21 76 8 kg (169 lb 5 oz)     · Diuretics initially held on admission - home regimen includes Lasix 20 mg twice daily patient reports compliance - overall on exam appears euvolemic  · Elevated BNP on admission around 7 K, repeat pending  · Concern for possible cardiac etiology for shortness of breath/worsening hypoxia  · Obtain echocardiogram  · Consider trial of IV diuresis  · Consider cardiology evaluation pending further workup as noted

## 2021-05-03 NOTE — PROGRESS NOTES
Vancomycin IV Pharmacy-to-Dose Consultation    Zachary Ramirez is a 77 y o  male who is currently receiving Vancomycin IV with management by the Pharmacy Consult service  Assessment/Plan:  The patient was reviewed  Renal function is stable and no signs or symptoms of nephrotoxicity and/or infusion reactions were documented in the chart  Based on todays assessment, continue current vancomycin (day # 2) dosing of 1000 mg Q12H, with a plan for trough to be drawn at 1730 on 5/4  We will continue to follow the patients culture results and clinical progress daily      Kacie Cassidy, Pharmacist

## 2021-05-03 NOTE — ASSESSMENT & PLAN NOTE
· Patient with recent diagnosis of COVID and was on 2 L nasal cannula as outpatient, patient reports that he otherwise was doing well  · Concern for possible cardiac etiology  · Patient got acutely short of breath prior to admission  · Currently on 5 L nasal canula  · Continue with the supplemental oxygen and wean off as tolerated

## 2021-05-03 NOTE — ASSESSMENT & PLAN NOTE
· Secondary to HCAP?  · Patient met the criteria for sepsis with fever and leukocytosis  · Patient also noted to have lactic acidosis and tachypnea  · Repeat lactic acid normal  · Continue antibiotics pending procalcitonin/blood cultures

## 2021-05-03 NOTE — CONSULTS
Pulmonary Consultation   Tanvi Skinner 77 y o  male MRN: [id number]  5/3/2021    Reason for consultation:  Pneumonia, acute respiratory failure, COVID-23    Requesting physician:  Madeline Garcia PA-C    Assessment/Plan:    Learta January Acute hypoxic respiratory failure   - patient was recently hospitalized with COVID-19, discharged home on 2 liters/minute  He is currently requiring 5 liters/minutes to maintain saturations above 90%  His pattern is not entirely typical for this be related to COVID 19, suspect some other superimposed process     Abnormal chest CT with diffuse ground-glass opacities, septal thickening and bilateral pleural effusions   - differential diagnosis includes pulmonary edema, persistent viral pneumonia and/or superimposed bacterial pneumonia  Arguing against bacterial process is normal procalcitonin level  Follow up repeat done today  Broad-spectrum antibiotics pending final culture data  Currently on Levaquin and vancomycin  Again, I do not think this is typical for COVID  There is no evidence of progressive fibrotic change fortunately  Evaluate potential for cardiac contribution as below     Chronic diastolic congestive heart failure   - markedly elevated BNP on most recent admission  Repeat today and also update echocardiogram     Bilateral pulmonary nodules measuring up to 1 3 cm    - patient had recent PET scan    · Nicotine dependence, in remission (40 pack year history, quit 11 years ago)    - consider outpatient PFTs after this acute process has resolved  Patient felt symptomatically better with albuterol  Will schedule 3 times daily  Discussed with Dr Leo Smalls    History of Present Illness   HPI:  Tanvi Skinner is a 77 y o  male who was initially diagnosed with COVID-19 on 4/1/21  He was hospitalized from 4/6/21 through 4/10/21, treated with IV steroids, vitamins and sent home with supplemental oxygen at 2 liters/minute  He was briefly readmitted on 04/15    Patient states that he had improved, until becoming rather suddenly short of breath on 5/2/21  Patient also had fever up to 103° with associated chills  On arrival to the emergency department, room air saturations are 77%  Currently requiring 5 liters/minutes to maintain saturations above 90%  Prior to having COVID, he had no history of lung disease including asthma or emphysema  He did find that an albuterol inhaler seemed to help his breathing to some degree  He has a history of coronary artery disease status post CABG and AVR, follows with Dr Beverly Ashton  He has been compliant with his diuretic regimen  Patient also has a history of lymphoma going back to 1999, requiring treatment on multiple occasions  He underwent PET scan last week per his oncologist at [facility]  Patient also has history of known bilateral pulmonary nodules  Review of Systems   Constitutional: Positive for unexpected weight change (Unintentional 10 lb weight loss)  Negative for chills and fever  HENT: Negative for postnasal drip and sore throat  Eyes: Negative for visual disturbance  Respiratory:        As noted in HPI   Cardiovascular: Negative for chest pain  Gastrointestinal: Negative for abdominal pain, diarrhea and vomiting  Musculoskeletal: Negative for arthralgias  Skin: Negative for rash  Neurological: Negative for headaches  Hematological: Negative for adenopathy  Psychiatric/Behavioral: Negative  All other systems reviewed and are negative      Historical Information   Past Medical History:   Diagnosis Date    Arthritis     [facility]rial fibrillation [facility] [address] [facility]     Cataract     Colitis     Colon cancer [facility] [address] [facility]     Diabetes mellitus type II, non insulin dependent ([facility] [address] [facility]     Fatty liver     History of chemotherapy     History of radiation therapy     History of shingles 2018    Hypertension     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[facility] [address] [facility]     Pneumonia     Skin cancer      Past Surgical History:   Procedure Laterality Date    AORTIC VALVE REPLACEMENT      COLECTOMY      COLONOSCOPY  2019    Prior right hemicolectomy    CORONARY ARTERY BYPASS GRAFT      DENTAL SURGERY      KNEE SURGERY      LYMPHADENECTOMY      OTHER SURGICAL HISTORY      MAZE PROCEDURE    ID TOTAL KNEE ARTHROPLASTY Left 2019    Procedure: ARTHROPLASTY LEFT KNEE TOTAL;  Surgeon: Rebeca Bates MD;  Location: [facility];  Service: Orthopedics    SKIN BIOPSY      UPPER GASTROINTESTINAL ENDOSCOPY  2019    Cricket gu     Family History   Problem Relation Age of Onset    Heart block Family     Coronary artery disease Mother     Thrombosis Mother     Hypertension Mother    Deadra Thuan Arthritis Mother     Hyperlipidemia Mother     Diabetes Father     Hypertension Father     Arthritis Father     Sudden death Father         cardiac    Colon cancer Paternal Grandfather     Breast cancer Sister     Heart disease Brother         Coronary stents         Social History     Tobacco Use   Smoking Status Former Smoker    Packs/day: 1 00    Years: 40 00    Pack years: 40 00    Types: Cigarettes    Quit date:     Years since quittin 3   Smokeless Tobacco Never Used       Meds/Allergies     Current Facility-Administered Medications:     acetaminophen (TYLENOL) tablet 650 mg, 650 mg, Oral, Q6H PRN, Mayi Tobin MD    [facility]) tablet 2 5 mg, 2 5 mg, Oral, BID, Mayi Tobin MD, 2 5 mg at 21 9848    aspirin chewable tablet 81 mg, 81 mg, Oral, Daily, Mayi Tobin MD, 81 mg at 21 1090    atorvastatin (LIPITOR) tablet 40 mg, 40 mg, Oral, Daily With Ynes Perez MD    cholecalciferol (VITAMIN D3) tablet 2,000 Units, 2,000 Units, Oral, Daily, Mayi Tobin MD, 2,000 Units at 21 1524    ferrous sulfate tablet 325 mg, 325 mg, Oral, Daily With Breakfast, Mayi Tobin MD, 325 mg at 21 0718    insulin lispro (HumaLOG) 100 units/mL subcutaneous injection 1-5 Units, 1-5 Units, Subcutaneous, TID AC, 3 Units at 21 9882 **AND** Fingerstick Glucose (POCT), , , TID AC, Josephina Duane, MD    insulin lispro (HumaLOG) 100 units/mL subcutaneous injection 1-5 Units, 1-5 Units, Subcutaneous, HS, Josephina Duane, MD, 1 Units at 05/02/21 2126    levofloxacin (LEVAQUIN) IVPB (premix in dextrose) 750 mg 150 mL, 750 mg, Intravenous, Q24H, Josephina Duane, MD    lisinopril (ZESTRIL) tablet 20 mg, 20 mg, Oral, Daily, Josephina Duane, MD, 20 mg at 05/03/21 2902    metoprolol succinate (TOPROL-XL) 24 hr tablet 100 mg, 100 mg, Oral, Daily, Josephina Duane, MD, 100 mg at 05/03/21 4082    multivitamin-minerals (CENTRUM ADULTS) tablet 1 tablet, 1 tablet, Oral, Daily, Josephina Duane, MD, 1 tablet at 05/03/21 2263    nitroglycerin (NITROSTAT) SL tablet 0 4 mg, 0 4 mg, Sublingual, Q5 Min PRN, Josephina Duane, MD    ondansetron [facility]) injection 4 mg, 4 mg, Intravenous, Q6H PRN, Josephina Duane, MD    pantoprazole (PROTONIX) EC tablet 40 mg, 40 mg, Oral, Early Morning, Josephina Duane, MD, 40 mg at 05/03/21 0532    saccharomyces boulardii (FLORASTOR) capsule 250 mg, 250 mg, Oral, BID, Josephina Duane, MD, 250 mg at 05/03/21 0195    vancomycin (VANCOCIN) IVPB (premix in dextrose) 1,000 mg 200 mL, 12 5 mg/kg, Intravenous, Q12H, Josephina Duane, MD, Last Rate: 200 mL/hr at 05/03/21 0530, 1,000 mg at 05/03/21 0530  Allergies   Allergen Reactions    Ceftin [Cefuroxime] Itching     However, has tolerated Cefazolin and Pip-Tazo since, which have different side chains  Be cautious with / avoid 2nd, 3rd, or 4th Gen Cephs that have similar side chains to Cefuroxime  Vitals: Blood pressure 130/66, pulse 74, temperature 97 7 °F (36 5 °C), resp  rate 17, weight 75 2 kg (165 lb 12 6 oz), SpO2 (!) 88 % , Body mass index is 23 12 kg/m²  Oxygen Therapy  SpO2: (!) 88 %    Physical Exam  Physical Exam  Constitutional:       General: He is not in acute distress  HENT:      Head: Normocephalic  Mouth/Throat:      Pharynx: No oropharyngeal exudate     Eyes: General: No scleral icterus  Pupils: Pupils are equal, round, and reactive to light  Neck:      Musculoskeletal: Neck supple  Vascular: No JVD  Cardiovascular:      Rate and Rhythm: Normal rate and regular rhythm  Heart sounds: Murmur present  Pulmonary:      Breath sounds: Rales present  No wheezing or rhonchi  Abdominal:      Palpations: Abdomen is soft  Tenderness: There is no abdominal tenderness  Musculoskeletal:      Right lower leg: No edema  Left lower leg: No edema  Lymphadenopathy:      Cervical: No cervical adenopathy  Skin:     General: Skin is warm and dry  Neurological:      Mental Status: He is alert and oriented to person, place, and time  Psychiatric:         Mood and Affect: Mood normal          Behavior: Behavior normal          Labs: I have personally reviewed pertinent lab results  Results from last 7 days   Lab Units 05/03/21  0536 05/02/21  1518   WBC Thousand/uL 5 83 11 21*   HEMOGLOBIN g/dL 8 7* 8 7*   HEMATOCRIT % 28 4* 27 9*   PLATELETS Thousands/uL  --  216         Results from last 7 days   Lab Units 05/03/21  0536 05/02/21  1518   POTASSIUM mmol/L 3 6 3 9   CHLORIDE mmol/L 100 99*   CO2 mmol/L 27 30   BUN mg/dL 17 12   CREATININE mg/dL 0 99 1 03   CALCIUM mg/dL 8 2* 8 3   ALK PHOS U/L 118* 126*   ALT U/L 23 23   AST U/L 14 14     Results from last 7 days   Lab Units 05/02/21  1518   INR  1 66*   PTT seconds 32     NT-BNP 7995 on 4/15/21    Imaging and other studies: I have personally reviewed pertinent reports     and I have personally reviewed pertinent films in PACS  Chest x-ray on 4/6/21 shows extensive bilateral ground-glass opacities, right greater than left    Chest x-ray performed on 4/15/21 showed mild progression ground-glass opacities    Chest CT performed 4/15/21 shows bilateral ground-glass opacities with septal thickening and small bilateral pleural effusions, left greater than right    Procalcitonin 0 21    Other Studies: I have personally reviewed pertinent reports        Echocardiogram from 2/15/19 - ejection fraction 60%, mild mitral regurgitation

## 2021-05-03 NOTE — ASSESSMENT & PLAN NOTE
· Patient was diagnosed with COVID on 04/06  · Patient was admitted twice after diagnosis from 4/ 6-4/10 and also from 4/15-4/20   · Patient finished steroids/supportive care  · Came to the hospital with worsening of shortness of breath and chest x-ray was suggestive of worsening of infiltrate  · Given fever possibility of secondary bacterial pneumonia however initial procalcitonin was normal, patient will be started on levofloxacin and vancomycin for healthcare associated pneumonia  · ID consulted on admission  · Currently on 5 L nasal cannula, will consult pulmonology for further recommendations  · Possible cardiac etiology for patient's shortness of breath/hypoxia, doubt active COVID infection

## 2021-05-03 NOTE — PROGRESS NOTES
[place]     Progress Note - Michelle Anderson 1945, 77 y o  male MRN: [id number]  Unit/Bed#: -01 Encounter: [id number]  Primary Care Provider: You Lazo DO   Date and time admitted to hospital: 5/2/2021  2:28 PM    * Pneumonia due to COVID-19 virus  Assessment & Plan  · Patient was diagnosed with COVID on 04/06  · Patient was admitted twice after diagnosis from 4/ 6-4/10 and also from 4/15-4/20   · Patient finished steroids/supportive care  · Came to the hospital with worsening of shortness of breath and chest x-ray was suggestive of worsening of infiltrate  · Given fever possibility of secondary bacterial pneumonia however initial procalcitonin was normal, patient will be started on levofloxacin and vancomycin for healthcare associated pneumonia  · ID consulted on admission  · Currently on 5 L nasal cannula, will consult pulmonology for further recommendations  · Possible cardiac etiology for patient's shortness of breath/hypoxia, doubt active COVID infection    Severe sepsis ([facility] [address] )  Assessment & Plan  · Secondary to HCAP?  · Patient met the criteria for sepsis with fever and leukocytosis  · Patient also noted to have lactic acidosis and tachypnea  · Repeat lactic acid normal  · Continue antibiotics pending procalcitonin/blood cultures    Paroxysmal atrial fibrillation ([facility] [address] )  Assessment & Plan  · Patient is on Eliquis as outpatient  · Rate controlled on metoprolol  · Continue with Eliquis    Chronic diastolic congestive heart failure ([facility] [address] )  Assessment & Plan  Wt Readings from Last 3 Encounters:   05/03/21 75 2 kg (165 lb 12 6 oz)   04/20/21 73 2 kg (161 lb 6 oz)   04/10/21 76 8 kg (169 lb 5 oz)     · Diuretics initially held on admission - home regimen includes Lasix 20 mg twice daily patient reports compliance - overall on exam appears euvolemic  · Elevated BNP on admission around 7 K, repeat pending  · Concern for possible cardiac etiology for shortness of breath/worsening hypoxia  · Obtain echocardiogram  · Consider trial of IV diuresis  · Consider cardiology evaluation pending further workup as noted    Acute respiratory failure with hypoxia ([address] )  Assessment & Plan  · Patient with recent diagnosis of COVID and was on 2 L nasal cannula as outpatient, patient reports that he otherwise was doing well  · Concern for possible cardiac etiology  · Patient got acutely short of breath prior to admission  · Currently on 5 L nasal canula  · Continue with the supplemental oxygen and wean off as tolerated    Dyslipidemia  Assessment & Plan  · Continue statin    Type 2 diabetes mellitus without complication, without long-term current use of insulin [facility])  Assessment & Plan  Lab Results   Component Value Date    HGBA1C 5 8 (H) 12/30/2020       Recent Labs     05/02/21  1836 05/02/21  2050 05/03/21  0704 05/03/21  1120   POCGLU 165* 196* 272* 303*       Blood Sugar Average: Last 72 hrs:  (P) 234   · Hold oral agents while inpatient  · SSI + accuchek  · Diabetic diet    Coronary disease  Assessment & Plan  · Status post CABG  · Continue with the aspirin statin and beta-blocker    Lactic acid acidosis-resolved as of 5/3/2021  Assessment & Plan  · Lactic acid 2 7 on admission  · Resolved on repeat    VTE Pharmacologic Prophylaxis:   Pharmacologic: Apixaban (Eliquis)  Mechanical VTE Prophylaxis in Place: Yes    Patient Centered Rounds: I have performed bedside rounds with nursing staff today  Discussions with Specialists or Other Care Team Provider:  Nursing, CM, Attending    Education and Discussions with Family / Patient: Discussed with patient directly at bedside  Attempted to call patient's wife, went straight to , message left  Time Spent for Care: 20 minutes  More than 50% of total time spent on counseling and coordination of care as described above      Current Length of Stay: 1 day(s)    Current Patient Status: Inpatient   Certification Statement: The patient will continue to require additional inpatient hospital stay due to acute resp failure with hypoxia    Discharge Plan: Pending clinical course    Code Status: Level 1 - Full Code      Subjective:   Patient states he overall feels well  Still with some exertion shortness of breath  Denies chest pain/palpitations, nausea vomiting, abdominal pain  Objective:     Vitals:   Temp (24hrs), Av 2 °F (36 8 °C), Min:97 7 °F (36 5 °C), Max:98 7 °F (37 1 °C)    Temp:  [97 7 °F (36 5 °C)-98 7 °F (37 1 °C)] 97 7 °F (36 5 °C)  HR:  [74-94] 74  Resp:  [17-32] 17  BP: (114-146)/(57-69) 130/66  SpO2:  [88 %-96 %] 88 %  Body mass index is 23 12 kg/m²  Input and Output Summary (last 24 hours): Intake/Output Summary (Last 24 hours) at 5/3/2021 1459  Last data filed at 5/3/2021 1208  Gross per 24 hour   Intake 680 ml   Output 350 ml   Net 330 ml       Physical Exam:     Physical Exam  Vitals signs and nursing note reviewed  Constitutional:       Appearance: He is well-developed  Interventions: Nasal cannula in place  Comments: Appears comfortable, no acute distress   HENT:      Head: Normocephalic and atraumatic  Eyes:      General: No scleral icterus  Extraocular Movements: Extraocular movements intact  Conjunctiva/sclera: Conjunctivae normal    Neck:      Musculoskeletal: Normal range of motion  Cardiovascular:      Rate and Rhythm: Normal rate and regular rhythm  Heart sounds: S1 normal and S2 normal  No murmur  Pulmonary:      Effort: Pulmonary effort is normal  No respiratory distress  Breath sounds: Decreased breath sounds present  Comments: Overall diminished breath sounds bilaterally  Abdominal:      General: Bowel sounds are normal       Palpations: Abdomen is soft  Tenderness: There is no abdominal tenderness  There is no guarding or rebound     Musculoskeletal:      Comments: Able to move upper/lower extremities bilaterally without difficulty, no edema   Skin: General: Skin is warm and dry  Neurological:      Mental Status: He is alert and oriented to person, place, and time  Psychiatric:         Mood and Affect: Mood normal          Speech: Speech normal          Behavior: Behavior normal            Additional Data:     Labs:    Results from last 7 days   Lab Units 05/03/21  0536 05/02/21  1518   WBC Thousand/uL 5 83 11 21*   HEMOGLOBIN g/dL 8 7* 8 7*   HEMATOCRIT % 28 4* 27 9*   PLATELETS Thousands/uL  --  216   NEUTROS PCT %  --  83*   LYMPHS PCT %  --  5*   MONOS PCT %  --  9   EOS PCT %  --  2     Results from last 7 days   Lab Units 05/03/21  0536   SODIUM mmol/L 138   POTASSIUM mmol/L 3 6   CHLORIDE mmol/L 100   CO2 mmol/L 27   BUN mg/dL 17   CREATININE mg/dL 0 99   ANION GAP mmol/L 11   CALCIUM mg/dL 8 2*   ALBUMIN g/dL 1 9*   TOTAL BILIRUBIN mg/dL 0 80   ALK PHOS U/L 118*   ALT U/L 23   AST U/L 14   GLUCOSE RANDOM mg/dL 273*     Results from last 7 days   Lab Units 05/02/21  1518   INR  1 66*     Results from last 7 days   Lab Units 05/03/21  1120 05/03/21  0704 05/02/21  2050 05/02/21  1836   POC GLUCOSE mg/dl 303* 272* 196* 165*         Results from last 7 days   Lab Units 05/03/21  0536 05/02/21  2048 05/02/21  1825 05/02/21  1518   LACTIC ACID mmol/L  --  2 0 2 7* 3 6*   PROCALCITONIN ng/ml 0 17  --  0 21 0 18           * I Have Reviewed All Lab Data Listed Above  * Additional Pertinent Lab Tests Reviewed: All Labs Within Last 24 Hours Reviewed    Imaging:    Imaging Reports Reviewed Today Include:  CXR  Imaging Personally Reviewed by Myself Includes:      Recent Cultures (last 7 days):     Results from last 7 days   Lab Units 05/02/21  1518   BLOOD CULTURE  Received in Microbiology Lab  Culture in Progress  Received in Microbiology Lab  Culture in Progress         Last 24 Hours Medication List:   Current Facility-Administered Medications   Medication Dose Route Frequency Provider Last Rate    acetaminophen  650 mg Oral Q6H PRN Kayley Yousif MD     [facility] albuterol  2 puff Inhalation TID Evelyne Mejia DO      apixaban  2 5 mg Oral BID Judy Yee MD      aspirin  81 mg Oral Daily Judy Yee MD      atorvastatin  40 mg Oral Daily With Natanael Nelson MD      cholecalciferol  2,000 Units Oral Daily Judy Yee MD      ferrous sulfate  325 mg Oral Daily With Breakfast Judy Yee MD      insulin lispro  1-5 Units Subcutaneous TID [facility] Judy Yee MD      insulin lispro  1-5 Units Subcutaneous HS Judy Yee MD      levofloxacin  750 mg Intravenous Q24H Judy Yee MD      lisinopril  20 mg Oral Daily Judy Yee MD      metoprolol succinate  100 mg Oral Daily Judy Yee MD      multivitamin-minerals  1 tablet Oral Daily Judy Yee MD      nitroglycerin  0 4 mg Sublingual Q5 Min PRN Judy Yee MD      ondansetron  4 mg Intravenous Q6H PRN Judy Yee MD      pantoprazole  40 mg Oral Early Morning Judy Yee MD      saccharomyces boulardii  250 mg Oral BID Judy Yee MD      vancomycin  12 5 mg/kg Intravenous Q12H Judy Yee MD 1,000 mg (05/03/21 0530)        Today, Patient Was Seen By: Vasiliy Funes PA-C    ** Please Note: Dictation voice to text software may have been used in the creation of this document   **

## 2021-05-04 ENCOUNTER — APPOINTMENT (INPATIENT)
Dept: NON INVASIVE DIAGNOSTICS | Facility: HOSPITAL | Age: 76
DRG: 871 | End: 2021-05-04
Payer: COMMERCIAL

## 2021-05-04 LAB
ANION GAP SERPL CALCULATED.3IONS-SCNC: 12 MMOL/L (ref 4–13)
BASOPHILS # BLD AUTO: 0.04 THOUSANDS/ΜL (ref 0–0.1)
BASOPHILS NFR BLD AUTO: 0 % (ref 0–1)
BUN SERPL-MCNC: 20 MG/DL (ref 5–25)
CALCIUM SERPL-MCNC: 8.3 MG/DL (ref 8.3–10.1)
CHLORIDE SERPL-SCNC: 99 MMOL/L (ref 100–108)
CO2 SERPL-SCNC: 28 MMOL/L (ref 21–32)
CREAT SERPL-MCNC: 1.03 MG/DL (ref 0.6–1.3)
CRP SERPL QL: 90.6 MG/L
EOSINOPHIL # BLD AUTO: 0.5 THOUSAND/ΜL (ref 0–0.61)
EOSINOPHIL NFR BLD AUTO: 3 % (ref 0–6)
ERYTHROCYTE [DISTWIDTH] IN BLOOD BY AUTOMATED COUNT: 19 % (ref 11.6–15.1)
FERRITIN SERPL-MCNC: 364 NG/ML (ref 8–388)
GFR SERPL CREATININE-BSD FRML MDRD: 71 ML/MIN/1.73SQ M
GLUCOSE SERPL-MCNC: 185 MG/DL (ref 65–140)
GLUCOSE SERPL-MCNC: 204 MG/DL (ref 65–140)
GLUCOSE SERPL-MCNC: 208 MG/DL (ref 65–140)
GLUCOSE SERPL-MCNC: 211 MG/DL (ref 65–140)
GLUCOSE SERPL-MCNC: 231 MG/DL (ref 65–140)
HCT VFR BLD AUTO: 28.5 % (ref 36.5–49.3)
HGB BLD-MCNC: 8.6 G/DL (ref 12–17)
IMM GRANULOCYTES # BLD AUTO: 0.1 THOUSAND/UL (ref 0–0.2)
IMM GRANULOCYTES NFR BLD AUTO: 1 % (ref 0–2)
LYMPHOCYTES # BLD AUTO: 0.71 THOUSANDS/ΜL (ref 0.6–4.47)
LYMPHOCYTES NFR BLD AUTO: 5 % (ref 14–44)
MCH RBC QN AUTO: 24.1 PG (ref 26.8–34.3)
MCHC RBC AUTO-ENTMCNC: 30.2 G/DL (ref 31.4–37.4)
MCV RBC AUTO: 80 FL (ref 82–98)
MONOCYTES # BLD AUTO: 0.85 THOUSAND/ΜL (ref 0.17–1.22)
MONOCYTES NFR BLD AUTO: 6 % (ref 4–12)
MRSA NOSE QL CULT: NORMAL
NEUTROPHILS # BLD AUTO: 12.92 THOUSANDS/ΜL (ref 1.85–7.62)
NEUTS SEG NFR BLD AUTO: 85 % (ref 43–75)
NRBC BLD AUTO-RTO: 0 /100 WBCS
PLATELET # BLD AUTO: 149 THOUSANDS/UL (ref 149–390)
PMV BLD AUTO: 10.8 FL (ref 8.9–12.7)
POTASSIUM SERPL-SCNC: 3.5 MMOL/L (ref 3.5–5.3)
RBC # BLD AUTO: 3.57 MILLION/UL (ref 3.88–5.62)
SODIUM SERPL-SCNC: 139 MMOL/L (ref 136–145)
VANCOMYCIN TROUGH SERPL-MCNC: 16.3 UG/ML (ref 10–20)
WBC # BLD AUTO: 15.12 THOUSAND/UL (ref 4.31–10.16)

## 2021-05-04 PROCEDURE — 80048 BASIC METABOLIC PNL TOTAL CA: CPT | Performed by: PHYSICIAN ASSISTANT

## 2021-05-04 PROCEDURE — 99232 SBSQ HOSP IP/OBS MODERATE 35: CPT | Performed by: INTERNAL MEDICINE

## 2021-05-04 PROCEDURE — 82728 ASSAY OF FERRITIN: CPT | Performed by: INTERNAL MEDICINE

## 2021-05-04 PROCEDURE — 80202 ASSAY OF VANCOMYCIN: CPT | Performed by: FAMILY MEDICINE

## 2021-05-04 PROCEDURE — 93308 TTE F-UP OR LMTD: CPT

## 2021-05-04 PROCEDURE — 85025 COMPLETE CBC W/AUTO DIFF WBC: CPT | Performed by: PHYSICIAN ASSISTANT

## 2021-05-04 PROCEDURE — 93325 DOPPLER ECHO COLOR FLOW MAPG: CPT | Performed by: INTERNAL MEDICINE

## 2021-05-04 PROCEDURE — 86140 C-REACTIVE PROTEIN: CPT | Performed by: INTERNAL MEDICINE

## 2021-05-04 PROCEDURE — 82948 REAGENT STRIP/BLOOD GLUCOSE: CPT

## 2021-05-04 PROCEDURE — 93308 TTE F-UP OR LMTD: CPT | Performed by: INTERNAL MEDICINE

## 2021-05-04 PROCEDURE — 94760 N-INVAS EAR/PLS OXIMETRY 1: CPT

## 2021-05-04 PROCEDURE — 93321 DOPPLER ECHO F-UP/LMTD STD: CPT | Performed by: INTERNAL MEDICINE

## 2021-05-04 RX ORDER — FUROSEMIDE 10 MG/ML
20 INJECTION INTRAMUSCULAR; INTRAVENOUS ONCE
Status: COMPLETED | OUTPATIENT
Start: 2021-05-04 | End: 2021-05-04

## 2021-05-04 RX ORDER — FUROSEMIDE 10 MG/ML
40 INJECTION INTRAMUSCULAR; INTRAVENOUS
Status: DISCONTINUED | OUTPATIENT
Start: 2021-05-04 | End: 2021-05-08

## 2021-05-04 RX ORDER — FUROSEMIDE 20 MG/1
20 TABLET ORAL 2 TIMES DAILY
Status: DISCONTINUED | OUTPATIENT
Start: 2021-05-04 | End: 2021-05-04

## 2021-05-04 RX ADMIN — Medication 250 MG: at 17:30

## 2021-05-04 RX ADMIN — FUROSEMIDE 20 MG: 10 INJECTION, SOLUTION INTRAMUSCULAR; INTRAVENOUS at 03:01

## 2021-05-04 RX ADMIN — MULTIPLE VITAMINS W/ MINERALS TAB 1 TABLET: TAB ORAL at 09:31

## 2021-05-04 RX ADMIN — Medication 2000 UNITS: at 09:30

## 2021-05-04 RX ADMIN — FERROUS SULFATE TAB 325 MG (65 MG ELEMENTAL FE) 325 MG: 325 (65 FE) TAB at 09:30

## 2021-05-04 RX ADMIN — VANCOMYCIN HYDROCHLORIDE 1000 MG: 1 INJECTION, SOLUTION INTRAVENOUS at 05:36

## 2021-05-04 RX ADMIN — Medication 250 MG: at 09:31

## 2021-05-04 RX ADMIN — INSULIN LISPRO 4 UNITS: 100 INJECTION, SOLUTION INTRAVENOUS; SUBCUTANEOUS at 15:49

## 2021-05-04 RX ADMIN — APIXABAN 2.5 MG: 2.5 TABLET, FILM COATED ORAL at 09:32

## 2021-05-04 RX ADMIN — VANCOMYCIN HYDROCHLORIDE 1000 MG: 1 INJECTION, SOLUTION INTRAVENOUS at 17:30

## 2021-05-04 RX ADMIN — FUROSEMIDE 40 MG: 10 INJECTION, SOLUTION INTRAVENOUS at 12:16

## 2021-05-04 RX ADMIN — LISINOPRIL 20 MG: 20 TABLET ORAL at 09:31

## 2021-05-04 RX ADMIN — PANTOPRAZOLE SODIUM 40 MG: 40 TABLET, DELAYED RELEASE ORAL at 05:36

## 2021-05-04 RX ADMIN — ALBUTEROL SULFATE 2 PUFF: 90 AEROSOL, METERED RESPIRATORY (INHALATION) at 14:01

## 2021-05-04 RX ADMIN — ASPIRIN 81 MG CHEWABLE TABLET 81 MG: 81 TABLET CHEWABLE at 09:31

## 2021-05-04 RX ADMIN — ALBUTEROL SULFATE 2 PUFF: 90 AEROSOL, METERED RESPIRATORY (INHALATION) at 09:29

## 2021-05-04 RX ADMIN — ATORVASTATIN CALCIUM 40 MG: 40 TABLET, FILM COATED ORAL at 15:50

## 2021-05-04 RX ADMIN — APIXABAN 2.5 MG: 2.5 TABLET, FILM COATED ORAL at 17:30

## 2021-05-04 RX ADMIN — ALBUTEROL SULFATE 2 PUFF: 90 AEROSOL, METERED RESPIRATORY (INHALATION) at 19:25

## 2021-05-04 RX ADMIN — INSULIN LISPRO 1 UNITS: 100 INJECTION, SOLUTION INTRAVENOUS; SUBCUTANEOUS at 09:27

## 2021-05-04 RX ADMIN — METOPROLOL SUCCINATE 100 MG: 50 TABLET, EXTENDED RELEASE ORAL at 09:30

## 2021-05-04 RX ADMIN — LEVOFLOXACIN 750 MG: 5 INJECTION, SOLUTION INTRAVENOUS at 15:50

## 2021-05-04 RX ADMIN — INSULIN LISPRO 1 UNITS: 100 INJECTION, SOLUTION INTRAVENOUS; SUBCUTANEOUS at 12:16

## 2021-05-04 RX ADMIN — INSULIN LISPRO 2 UNITS: 100 INJECTION, SOLUTION INTRAVENOUS; SUBCUTANEOUS at 20:42

## 2021-05-04 RX ADMIN — FUROSEMIDE 20 MG: 10 INJECTION, SOLUTION INTRAMUSCULAR; INTRAVENOUS at 03:25

## 2021-05-04 NOTE — PLAN OF CARE
Problem: RESPIRATORY - ADULT  Goal: Achieves optimal ventilation and oxygenation  Description: INTERVENTIONS:  - Assess for changes in respiratory status  - Assess for changes in mentation and behavior  - Position to facilitate oxygenation and minimize respiratory effort  - Oxygen administered by appropriate delivery if ordered  - Initiate smoking cessation education as indicated  - Encourage broncho-pulmonary hygiene including cough, deep breathe, Incentive Spirometry  - Assess the need for suctioning and aspirate as needed  - Assess and instruct to report SOB or any respiratory difficulty  - Respiratory Therapy support as indicated  Outcome: Progressing     Problem: DISCHARGE PLANNING  Goal: Discharge to home or other facility with appropriate resources  Description: INTERVENTIONS:  - Identify barriers to discharge w/patient and caregiver  - Arrange for needed discharge resources and transportation as appropriate  - Identify discharge learning needs (meds, wound care, etc )  - Arrange for interpretive services to assist at discharge as needed  - Refer to Case Management Department for coordinating discharge planning if the patient needs post-hospital services based on physician/advanced practitioner order or complex needs related to functional status, cognitive ability, or social support system  Outcome: Progressing     Problem: Knowledge Deficit  Goal: Patient/family/caregiver demonstrates understanding of disease process, treatment plan, medications, and discharge instructions  Description: Complete learning assessment and assess knowledge base    Interventions:  - Provide teaching at level of understanding  - Provide teaching via preferred learning methods  Outcome: Progressing     Problem: PAIN - ADULT  Goal: Verbalizes/displays adequate comfort level or baseline comfort level  Description: Interventions:  - Encourage patient to monitor pain and request assistance  - Assess pain using appropriate pain scale  - Administer analgesics based on type and severity of pain and evaluate response  - Implement non-pharmacological measures as appropriate and evaluate response  - Consider cultural and social influences on pain and pain management  - Notify physician/advanced practitioner if interventions unsuccessful or patient reports new pain  Outcome: Progressing     Problem: INFECTION - ADULT  Goal: Absence or prevention of progression during hospitalization  Description: INTERVENTIONS:  - Assess and monitor for signs and symptoms of infection  - Monitor lab/diagnostic results  - Monitor all insertion sites, i e  indwelling lines, tubes, and drains  - Monitor endotracheal if appropriate and nasal secretions for changes in amount and color  - [place] appropriate cooling/warming therapies per order  - Administer medications as ordered  - Instruct and encourage patient and family to use good hand hygiene technique  - Identify and instruct in appropriate isolation precautions for identified infection/condition  Outcome: Progressing  Goal: Absence of fever/infection during neutropenic period  Description: INTERVENTIONS:  - Monitor WBC    Outcome: Progressing     Problem: SAFETY ADULT  Goal: Patient will remain free of falls  Description: INTERVENTIONS:  - Assess patient frequently for physical needs  -  Identify cognitive and physical deficits and behaviors that affect risk of falls    -  [place] fall precautions as indicated by assessment   - Educate patient/family on patient safety including physical limitations  - Instruct patient to call for assistance with activity based on assessment  - Modify environment to reduce risk of injury  - Consider OT/PT consult to assist with strengthening/mobility  Outcome: Progressing  Goal: Maintain or return to baseline ADL function  Description: INTERVENTIONS:  -  Assess patient's ability to carry out ADLs; assess patient's baseline for ADL function and identify physical deficits which impact ability to perform ADLs (bathing, care of mouth/teeth, toileting, grooming, dressing, etc )  - Assess/evaluate cause of self-care deficits   - Assess range of motion  - Assess patient's mobility; develop plan if impaired  - Assess patient's need for assistive devices and provide as appropriate  - Encourage maximum independence but intervene and supervise when necessary  - Involve family in performance of ADLs  - Assess for home care needs following discharge   - Consider OT consult to assist with ADL evaluation and planning for discharge  - Provide patient education as appropriate  Outcome: Progressing  Goal: Maintain or return mobility status to optimal level  Description: INTERVENTIONS:  - Assess patient's baseline mobility status (ambulation, transfers, stairs, etc )    - Identify cognitive and physical deficits and behaviors that affect mobility  - Identify mobility aids required to assist with transfers and/or ambulation (gait belt, sit-to-stand, lift, walker, cane, etc )  - [place] fall precautions as indicated by assessment  - Record patient progress and toleration of activity level on Mobility SBAR; progress patient to next Phase/Stage  - Instruct patient to call for assistance with activity based on assessment  - Consider rehabilitation consult to assist with strengthening/weightbearing, etc   Outcome: Progressing     Problem: Potential for Falls  Goal: Patient will remain free of falls  Description: INTERVENTIONS:  - Assess patient frequently for physical needs  -  Identify cognitive and physical deficits and behaviors that affect risk of falls    -  [place] fall precautions as indicated by assessment   - Educate patient/family on patient safety including physical limitations  - Instruct patient to call for assistance with activity based on assessment  - Modify environment to reduce risk of injury  - Consider OT/PT consult to assist with strengthening/mobility  Outcome: Progressing

## 2021-05-04 NOTE — CONSULTS
Consultation - Infectious Disease   Merlene Silva 77 y o  male MRN: [id number]  Unit/Bed#: -01 Encounter: [id number]      Assessment/Plan   1  Sepsis/Pneumonia/Fever/Hypoxia: Pt presented with increasing SOB and cough x 1 day  He has h/o COVID19 pneumonia which was tx'd with Remdesivir and decadron during his admission from 4/6 to 4/10/21  He was re-admitted from 4/15 to 4/20 with increasing SOB and was tx'd with steroids  At this point, it's less likely that his current sx's are due to ongoing COVID19 infxn since it's approx one month from his original DX although cannot entirely exclude ongoing inflammation  Agree that bacterial pneumonia needs to considered since he has fever and elevated WBC count although Procalcitonin level is not markedly elevated but lactic acid is increased suggestive of sepsis  Urine Legionella Ag and Strep pneumo Ag are neg  Bld cx's are neg so far  He feels better today - ?due to the dose of Solumedrol that he received in the ER  Currently, he is on Vanco and Levaquin  A  Cont current abx for tx of possible pneumonia  B  No indication for tx with Remdesivir  C  Will defer further steroid tx as per Pulmonary  D  Awaiting final cx results  E  Cont supplemental O2 as needed  F  Will check COVD19 Ab to make sure that he has developed immunity to COVID19 virus  G  Will check CRP and ferritin to check for evidence of ongoing inflammation from previous COVID19 infxn  H  Cont enhanced respiratory isolation for now      History of Present Illness   Physician Requesting Consult: Albert Salazar DO  Reason for Consult / Principal Problem: Increased SOB and cough    HPI: Merlene Silva is a 77y o  year old male with H/O COVID19 infxn, colon CA - s/p right hemicolectomy, PAF, s/p AVR, CHF, HLD, DM, CAD - s/p CABG, arthritis, A fib, MALT lymphoma, and s/p left TKR who was admitted on 5/2 with c/o increased SOB and cough x 1 day   Pt was hospitalized at [facility] from 4/6 to 4/10 for tx of COVID19 infxn  He received tx with Remdesivir and Decadron  He was re-admitted from 4/15 to 4/20 with increasing SOB and was tx'd with steroids  On admission, Pt had fever and leukocytosis  Lactic acid was elevated  He was hypoxic and required supplemental O2 of 4L by NC  CXR showed increased bilat pulmonary infilts  He received one dose of Solumedrol in the ER and was placed on Vanco and Levaquin for possible pneumonia  He feels better today  Urine Legionella Ag and Strep pneumo Ag are neg  He denies CP, N/V/D, abd pain, or dysuria  Inpatient consult to Infectious Diseases  Consult performed by: Emiliana Salas MD  Consult ordered by: Nan Darnell MD          ROS: 12 systems reviewed, remainder is neg      Historical Information   Past Medical History:   Diagnosis Date    Arthritis     Atrial fibrillation [facility] [address] [facility]     Cataract     Colitis     Colon cancer ([facility] [address] [facility]     Diabetes mellitus type II, non insulin dependent ([facility] [address] )     Fatty liver     History of chemotherapy     History of radiation therapy     History of shingles 2018    Hypertension     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[facility] [address] [facility]     Pneumonia     Skin cancer      Past Surgical History:   Procedure Laterality Date    AORTIC VALVE REPLACEMENT      COLECTOMY      COLONOSCOPY  11/2019    Prior right hemicolectomy    CORONARY ARTERY BYPASS GRAFT      DENTAL SURGERY      KNEE SURGERY      LYMPHADENECTOMY      OTHER SURGICAL HISTORY      MAZE PROCEDURE    CT TOTAL KNEE ARTHROPLASTY Left 1/28/2019    Procedure: ARTHROPLASTY LEFT KNEE TOTAL;  Surgeon: Suyapa Madison MD;  Location:  MAIN OR;  Service: Orthopedics    SKIN BIOPSY      UPPER GASTROINTESTINAL ENDOSCOPY  11/2019    Schatzki ring     Social History   Social History     Substance and Sexual Activity   Alcohol Use Yes    Frequency: Monthly or less    Drinks per session: 1 or 2    Binge frequency: Never    Comment: very rare "beer here and there"     Social History     Substance and Sexual Activity Drug Use No     Social History     Tobacco Use   Smoking Status Former Smoker    Packs/day: 1 00    Years: 40 00    Pack years: 40 00    Types: Cigarettes    Quit date:     Years since quittin 3   Smokeless Tobacco Never Used     Family History   Problem Relation Age of Onset    Heart block Family     Coronary artery disease Mother     Thrombosis Mother     Hypertension Mother     Arthritis Mother     Hyperlipidemia Mother     Diabetes Father     Hypertension Father     Arthritis Father     Sudden death Father         cardiac    Colon cancer Paternal Grandfather     Breast cancer Sister     Heart disease Brother         Coronary stents       Meds/Allergies   MEDS:   Vanco: #2  Levaquin: #2      Current Facility-Administered Medications:     acetaminophen (TYLENOL) tablet 650 mg, 650 mg, Oral, Q6H PRN, Sj Meredith MD    albuterol (PROVENTIL HFA,VENTOLIN HFA) inhaler 2 puff, 2 puff, Inhalation, TID, Hafsa Thompson DO, 2 puff at 21    apixaban (ELIQUIS) tablet 2 5 mg, 2 5 mg, Oral, BID, Sj Meredith MD, 2 5 mg at 21 172    aspirin chewable tablet 81 mg, 81 mg, Oral, Daily, Sj Meredith MD, 81 mg at 21 2243    atorvastatin (LIPITOR) tablet 40 mg, 40 mg, Oral, Daily With Rafael Ashley MD, 40 mg at 21 1546    cholecalciferol (VITAMIN D3) tablet 2,000 Units, 2,000 Units, Oral, Daily, Sj Meredith MD, 2,000 Units at 21 3642    ferrous sulfate tablet 325 mg, 325 mg, Oral, Daily With Breakfast, Sj Meredith MD, 325 mg at 21 0718    insulin lispro (HumaLOG) 100 units/mL subcutaneous injection 1-5 Units, 1-5 Units, Subcutaneous, TID AC, 3 Units at 21 1701 **AND** Fingerstick Glucose (POCT), , , TID AC, Sj Meredith MD    insulin lispro (HumaLOG) 100 units/mL subcutaneous injection 1-5 Units, 1-5 Units, Subcutaneous, HS, Sj Meredith MD, 1 Units at 21 2126    levofloxacin (LEVAQUIN) IVPB (premix in dextrose) 750 mg 150 mL, 750 mg, Intravenous, Q24H, Cholo Flynn MD, Last Rate: 100 mL/hr at 05/03/21 1546, 750 mg at 05/03/21 1546    lisinopril (ZESTRIL) tablet 20 mg, 20 mg, Oral, Daily, Cholo Flynn MD, 20 mg at 05/03/21 1366    metoprolol succinate (TOPROL-XL) 24 hr tablet 100 mg, 100 mg, Oral, Daily, Cholo Flynn MD, 100 mg at 05/03/21 5923    multivitamin-minerals (CENTRUM ADULTS) tablet 1 tablet, 1 tablet, Oral, Daily, Cholo Flynn MD, 1 tablet at 05/03/21 3151    nitroglycerin (NITROSTAT) SL tablet 0 4 mg, 0 4 mg, Sublingual, Q5 Min PRN, Cholo Flynn MD    [facility]ron [facility]) injection 4 mg, 4 mg, Intravenous, Q6H PRN, Cholo Flynn MD    pantoprazole (PROTONIX) EC tablet 40 mg, 40 mg, Oral, Early Morning, Cholo Flynn MD, 40 mg at 05/03/21 0532    saccharomyces boulardii (FLORASTOR) capsule 250 mg, 250 mg, Oral, BID, Cholo Flynn MD, 250 mg at 05/03/21 1724    vancomycin (VANCOCIN) IVPB (premix in dextrose) 1,000 mg 200 mL, 12 5 mg/kg, Intravenous, Q12H, Cholo Flynn MD, Last Rate: 200 mL/hr at 05/03/21 1724, 1,000 mg at 05/03/21 1724    Allergies   Allergen Reactions    Ceftin [Cefuroxime] Itching     However, has tolerated Cefazolin and Pip-Tazo since, which have different side chains  Be cautious with / avoid 2nd, 3rd, or 4th Gen Cephs that have similar side chains to Cefuroxime           Intake/Output Summary (Last 24 hours) at 5/3/2021 2207  Last data filed at 5/3/2021 1724  Gross per 24 hour   Intake 830 ml   Output 300 ml   Net 530 ml       PE:  WD, WN, WM in NAD  VSS, Tmax: 102 8  HEENT:  No scleral icterus  NECK: Supple  CARDIAC:  RRR, nml S1, S2  LUNGS: +Crackles at the bases  ABDOMEN:  +BS, soft, nontender  EXTREMITIES:  No calf tenderness  SKIN: No rash  NEURO: Grossly nonfocal    Invasive Devices:   Peripheral IV 05/02/21 Left Forearm (Active)   Site Assessment WDL 05/03/21 2015   Dressing Type Transparent 05/03/21 2015   Line Status Flushed;Capped 05/03/21 2015 Dressing Status Clean;Dry; Intact 05/03/21 2015       Peripheral IV 05/02/21 Right Forearm (Active)   Site Assessment WDL 05/03/21 2015   Dressing Type Transparent 05/03/21 2015   Line Status Flushed;Capped 05/03/21 2015   Dressing Status Clean;Dry; Intact 05/03/21 2015           Lab Results:   Admission on 05/02/2021   Component Date Value    WBC 05/02/2021 11 21*    RBC 05/02/2021 3 47*    Hemoglobin 05/02/2021 8 7*    Hematocrit 05/02/2021 27 9*    MCV 05/02/2021 80*    MCH 05/02/2021 25 1*    MCHC 05/02/2021 31 2*    RDW 05/02/2021 19 3*    MPV 05/02/2021 11 0     Platelets 70/30/8624 216     nRBC 05/02/2021 0     Neutrophils Relative 05/02/2021 83*    Immat GRANS % 05/02/2021 1     Lymphocytes Relative 05/02/2021 5*    Monocytes Relative 05/02/2021 9     Eosinophils Relative 05/02/2021 2     Basophils Relative 05/02/2021 0     Neutrophils Absolute 05/02/2021 9 33*    Immature Grans Absolute 05/02/2021 0 08     Lymphocytes Absolute 05/02/2021 0 58*    Monocytes Absolute 05/02/2021 0 96     Eosinophils Absolute 05/02/2021 0 21     Basophils Absolute 05/02/2021 0 05     Sodium 05/02/2021 139     Potassium 05/02/2021 3 9     Chloride 05/02/2021 99*    CO2 05/02/2021 30     ANION GAP 05/02/2021 10     BUN 05/02/2021 12     Creatinine 05/02/2021 1 03     Glucose 05/02/2021 164*    Calcium 05/02/2021 8 3     Corrected Calcium 05/02/2021 9 7     AST 05/02/2021 14     ALT 05/02/2021 23     Alkaline Phosphatase 05/02/2021 126*    Total Protein 05/02/2021 6 1*    Albumin 05/02/2021 2 3*    Total Bilirubin 05/02/2021 0 80     eGFR 05/02/2021 71     LACTIC ACID 05/02/2021 3 6*    Procalcitonin 05/02/2021 0 18     Protime 05/02/2021 19 5*    INR 05/02/2021 1 66*    PTT 05/02/2021 32     Blood Culture 05/02/2021 Received in Microbiology Lab  Culture in Progress   Blood Culture 05/02/2021 Received in Microbiology Lab  Culture in Progress       LACTIC ACID 05/02/2021 2 7*    Strep pneumoniae antigen* 05/03/2021 Negative     Legionella Urinary Antig* 05/03/2021 Negative     Procalcitonin 05/02/2021 0 21     POC Glucose 05/02/2021 165*    LACTIC ACID 05/02/2021 2 0     POC Glucose 05/02/2021 196*    Procalcitonin 05/03/2021 0 17     Sodium 05/03/2021 138     Potassium 05/03/2021 3 6     Chloride 05/03/2021 100     CO2 05/03/2021 27     ANION GAP 05/03/2021 11     BUN 05/03/2021 17     Creatinine 05/03/2021 0 99     Glucose 05/03/2021 273*    Calcium 05/03/2021 8 2*    Corrected Calcium 05/03/2021 9 9     AST 05/03/2021 14     ALT 05/03/2021 23     Alkaline Phosphatase 05/03/2021 118*    Total Protein 05/03/2021 5 6*    Albumin 05/03/2021 1 9*    Total Bilirubin 05/03/2021 0 80     eGFR 05/03/2021 75     WBC 05/03/2021 5 83     RBC 05/03/2021 3 53*    Hemoglobin 05/03/2021 8 7*    Hematocrit 05/03/2021 28 4*    MCV 05/03/2021 81*    MCH 05/03/2021 24 6*    MCHC 05/03/2021 30 6*    RDW 05/03/2021 19 1*    Platelets 38/91/0841      POC Glucose 05/03/2021 272*    Ventricular Rate 05/02/2021 83     Atrial Rate 05/02/2021 63     QRSD Interval 05/02/2021 126     QT Interval 05/02/2021 444     QTC Interval 05/02/2021 521     QRS Axis 05/02/2021 -31     T Wave Axis 05/02/2021 75     Ventricular Rate 05/02/2021 83     Atrial Rate 05/02/2021 83     TN Interval 05/02/2021 174     QRSD Interval 05/02/2021 124     QT Interval 05/02/2021 434     QTC Interval 05/02/2021 509     P Axis 05/02/2021 81     QRS Axis 05/02/2021 -29     T Wave Axis 05/02/2021 75     POC Glucose 05/03/2021 303*    NT-proBNP 05/03/2021 11,568*    POC Glucose 05/03/2021 326*     Imaging Studies: I have personally reviewed pertinent reports  EKG, Pathology, and Other Studies: I have personally reviewed pertinent reports  Culture  Lab Results   Component Value Date    BLOODCX Received in Microbiology Lab  Culture in Progress   05/02/2021    BLOODCX Received in Microbiology Lab  Culture in Progress  05/02/2021    BLOODCX No Growth After 5 Days  04/15/2021    BLOODCX No Growth After 5 Days  04/15/2021    BLOODCX No Growth After 5 Days  04/06/2021    BLOODCX No Growth After 5 Days  04/06/2021    BLOODCX No Growth After 5 Days  04/01/2021    BLOODCX No Growth After 5 Days  04/01/2021    BLOODCX No Growth After 5 Days  02/27/2019    BLOODCX No Growth After 5 Days  02/27/2019    BLOODCX No Growth After 5 Days  11/25/2018    BLOODCX No Growth After 5 Days   11/25/2018     No results found for: WOUNDCULT  No results found for: URINECX  No results found for: SPUTUMCULTUR    Principal Problem:    Pneumonia due to COVID-19 virus  Active Problems:    Coronary disease    Type 2 diabetes mellitus without complication, without long-term current use of insulin (HCC)    Dyslipidemia    Acute respiratory failure with hypoxia (HCC)    Chronic diastolic congestive heart failure (HCC)    Paroxysmal atrial fibrillation (HCC)    Severe sepsis ([facility] [address] )

## 2021-05-04 NOTE — PROGRESS NOTES
[place]     Progress Note - [facility] 1945, 77 y o  male MRN: [id number]  Unit/Bed#: -01 Encounter: [id number]  Primary Care Provider: Jeri Reyna DO   Date and time admitted to hospital: 5/2/2021  2:28 PM    * Pneumonia due to COVID-19 virus  Assessment & Plan  · Patient was diagnosed with COVID on 04/06  · Patient was admitted twice after diagnosis from 4/ 6-4/10 and also from 4/15-4/20   · Patient finished steroids/supportive care  · Came to the hospital with worsening of shortness of breath and chest x-ray was suggestive of worsening of infiltrate  · Given fever possibility of secondary bacterial pneumonia however initial procalcitonin was normal, patient will be started on levofloxacin and vancomycin for healthcare associated pneumonia  · ID consulted on admission  · Currently on 6 L midflow, pulmonology following - appreciate ongoing recommendations  · Possible cardiac etiology for patient's shortness of breath/hypoxia, doubt active COVID infection    Severe sepsis ([facility] [address] )  Assessment & Plan  · Secondary to HCAP?  · Patient met the criteria for sepsis with fever and leukocytosis  · Patient also noted to have lactic acidosis and tachypnea  · Repeat lactic acid normal  · Continue antibiotics pending procalcitonin/blood cultures    Paroxysmal atrial fibrillation ([facility] [address] )  Assessment & Plan  · Patient is on Eliquis as outpatient  · Rate controlled on metoprolol  · Continue with Eliquis    Chronic diastolic congestive heart failure ([facility] [address] )  Assessment & Plan  Wt Readings from Last 3 Encounters:   05/04/21 70 6 kg (155 lb 11 2 oz)   04/20/21 73 2 kg (161 lb 6 oz)   04/10/21 76 8 kg (169 lb 5 oz)     · Diuretics initially held on admission - home regimen includes Lasix 20 mg twice daily patient reports compliance - overall on exam appears euvolemic  · Elevated BNP on admission around 7 K, repeat shows 11 K  · Concern for possible cardiac etiology for shortness of breath/worsening hypoxia  · Obtain echocardiogram - pending  · Trial IV diuresis, good urine output after 40 mg IV Lasix overnight  · Consider cardiology evaluation pending further workup as noted    Acute respiratory failure with hypoxia ([address] )  Assessment & Plan  · Patient with recent diagnosis of COVID and was on 2 L nasal cannula as outpatient, patient reports that he otherwise was doing well  · Concern for possible cardiac etiology - echocardiogram completed and pending  · Patient got acutely short of breath prior to admission  · Currently on 6 L mid flow, SpO2 92%  · Will trial IV diuresis given elevated BNP  · Continue with the supplemental oxygen and wean off as tolerated    Dyslipidemia  Assessment & Plan  · Continue statin    Type 2 diabetes mellitus without complication, without long-term current use of insulin [facility])  Assessment & Plan  Lab Results   Component Value Date    HGBA1C 5 8 (H) 12/30/2020       Recent Labs     05/03/21  1703 05/03/21  2211 05/04/21  0925 05/04/21  1214   POCGLU 326* 259* 208* 204*       Blood Sugar Average: Last 72 hrs:  (P) 241 625   · Hold oral agents while inpatient  · SSI + accuchek  · Still with hyperglycemia, will increase coverage  · Diabetic diet    Coronary disease  Assessment & Plan  · Status post CABG  · Continue with the aspirin statin and beta-blocker    Lactic acid acidosis-resolved as of 5/3/2021  Assessment & Plan  · Lactic acid 2 7 on admission  · Resolved on repeat    VTE Pharmacologic Prophylaxis:   Pharmacologic: Apixaban (Eliquis)  Mechanical VTE Prophylaxis in Place: Yes    Patient Centered Rounds: I have performed bedside rounds with nursing staff today  Discussions with Specialists or Other Care Team Provider: Nursing, CM, attending, appreciate pulm input    Education and Discussions with Family / Patient: Discussed with patient directly at bedside  Discussed with patient's wife Farhan Wood via phone  Time Spent for Care: 30 minutes    More than 50% of total time spent on counseling and coordination of care as described above  Current Length of Stay: 2 day(s)    Current Patient Status: Inpatient   Certification Statement: The patient will continue to require additional inpatient hospital stay due to acute resp failure w/ hypoxia    Discharge Plan: Pending clinical course    Code Status: Level 1 - Full Code      Subjective:   Patient states he feels much better today than he did last night  Denies chest pain/palpitations  Still with some shortness of breath  Denies nausea/vomiting, abdominal pain, diarrhea  Objective:     Vitals:   Temp (24hrs), Av °F (36 7 °C), Min:97 8 °F (36 6 °C), Max:98 2 °F (36 8 °C)    Temp:  [97 8 °F (36 6 °C)-98 2 °F (36 8 °C)] 98 2 °F (36 8 °C)  HR:  [75-89] 89  Resp:  [16-19] 19  BP: (122-131)/(63-65) 122/65  SpO2:  [89 %-99 %] 91 %  Body mass index is 21 72 kg/m²  Input and Output Summary (last 24 hours): Intake/Output Summary (Last 24 hours) at 2021 1352  Last data filed at 2021 1215  Gross per 24 hour   Intake 930 ml   Output 2825 ml   Net -1895 ml       Physical Exam:     Physical Exam  Vitals signs and nursing note reviewed  Constitutional:       Appearance: He is well-developed  Interventions: Nasal cannula in place  Comments: Appears comfortable, no acute distress   HENT:      Head: Normocephalic and atraumatic  Eyes:      General: No scleral icterus  Extraocular Movements: Extraocular movements intact  Conjunctiva/sclera: Conjunctivae normal    Neck:      Musculoskeletal: Normal range of motion  Cardiovascular:      Rate and Rhythm: Normal rate and regular rhythm  Heart sounds: S1 normal and S2 normal  No murmur  Pulmonary:      Effort: Pulmonary effort is normal  No respiratory distress  Breath sounds: Examination of the right-lower field reveals rales  Examination of the left-lower field reveals rales  Rales present     Abdominal:      General: Bowel sounds are normal       Palpations: Abdomen is soft  Tenderness: There is no abdominal tenderness  There is no guarding or rebound  Musculoskeletal:      Comments: Able to move upper/lower extremities bilaterally without difficulty, no edema   Skin:     General: Skin is warm and dry  Neurological:      Mental Status: He is alert and oriented to person, place, and time  Psychiatric:         Mood and Affect: Mood normal          Speech: Speech normal          Behavior: Behavior normal          Additional Data:     Labs:    Results from last 7 days   Lab Units 05/04/21  0541   WBC Thousand/uL 15 12*   HEMOGLOBIN g/dL 8 6*   HEMATOCRIT % 28 5*   PLATELETS Thousands/uL 149   NEUTROS PCT % 85*   LYMPHS PCT % 5*   MONOS PCT % 6   EOS PCT % 3     Results from last 7 days   Lab Units 05/04/21  0541 05/03/21  0536   SODIUM mmol/L 139 138   POTASSIUM mmol/L 3 5 3 6   CHLORIDE mmol/L 99* 100   CO2 mmol/L 28 27   BUN mg/dL 20 17   CREATININE mg/dL 1 03 0 99   ANION GAP mmol/L 12 11   CALCIUM mg/dL 8 3 8 2*   ALBUMIN g/dL  --  1 9*   TOTAL BILIRUBIN mg/dL  --  0 80   ALK PHOS U/L  --  118*   ALT U/L  --  23   AST U/L  --  14   GLUCOSE RANDOM mg/dL 231* 273*     Results from last 7 days   Lab Units 05/02/21  1518   INR  1 66*     Results from last 7 days   Lab Units 05/04/21  1214 05/04/21  0925 05/03/21  2211 05/03/21  1703 05/03/21  1120 05/03/21  0704 05/02/21  2050 05/02/21  1836   POC GLUCOSE mg/dl 204* 208* 259* 326* 303* 272* 196* 165*         Results from last 7 days   Lab Units 05/03/21  0536 05/02/21  2048 05/02/21  1825 05/02/21  1518   LACTIC ACID mmol/L  --  2 0 2 7* 3 6*   PROCALCITONIN ng/ml 0 17  --  0 21 0 18           * I Have Reviewed All Lab Data Listed Above  * Additional Pertinent Lab Tests Reviewed:  All Labs Within Last 24 Hours Reviewed    Imaging:    Imaging Reports Reviewed Today Include:   Imaging Personally Reviewed by Myself Includes:      Recent Cultures (last 7 days):     Results from last 7 days Lab Units 05/03/21  0539 05/02/21  1518   BLOOD CULTURE   --  No Growth at 24 hrs  No Growth at 24 hrs  LEGIONELLA URINARY ANTIGEN  Negative  --        Last 24 Hours Medication List:   Current Facility-Administered Medications   Medication Dose Route Frequency Provider Last Rate    acetaminophen  650 mg Oral Q6H PRN Chanell Hernandez MD      albuterol  2 puff Inhalation TID [employer], DO      apixaban  2 5 mg Oral BID Chanell Hernandez MD      aspirin  81 mg Oral Daily Chanell Hernandez MD      atorvastatin  40 mg Oral Daily With Terroxy Swanson MD      cholecalciferol  2,000 Units Oral Daily Chanell Hernandez MD      ferrous sulfate  325 mg Oral Daily With Breakfast Chanell Hernandez MD      furosemide  40 mg Intravenous BID (diuretic) Jeremy Giang PA-C      insulin lispro  1-5 Units Subcutaneous TID [facility] Chanell Hernandez MD      insulin lispro  1-5 Units Subcutaneous HS Chanell Hernandez MD      levofloxacin  750 mg Intravenous Q24H Chanell Hernandez  mg (05/03/21 1546)    lisinopril  20 mg Oral Daily Chanell Hernandez MD      metoprolol succinate  100 mg Oral Daily Chanell Hernandez MD      multivitamin-minerals  1 tablet Oral Daily Chanell Hernandez MD      nitroglycerin  0 4 mg Sublingual Q5 Min PRN Chanell Hernandez MD      ondansetron  4 mg Intravenous Q6H PRN Chanell Hernandez MD      pantoprazole  40 mg Oral Early Morning Chanell Hernandez MD      saccharomyces boulardii  250 mg Oral BID Chanell Hernandez MD      vancomycin  12 5 mg/kg Intravenous Q12H Chanell Hernandez MD 1,000 mg (05/04/21 0536)        Today, Patient Was Seen By: Jeremy Giang PA-C    ** Please Note: Dictation voice to text software may have been used in the creation of this document   **

## 2021-05-04 NOTE — ASSESSMENT & PLAN NOTE
· Patient with recent diagnosis of COVID and was on 2 L nasal cannula as outpatient, patient reports that he otherwise was doing well  · Concern for possible cardiac etiology - echocardiogram completed and pending  · Patient got acutely short of breath prior to admission  · Currently on 6 L mid flow, SpO2 92%  · Will trial IV diuresis given elevated BNP  · Continue with the supplemental oxygen and wean off as tolerated

## 2021-05-04 NOTE — ASSESSMENT & PLAN NOTE
Lab Results   Component Value Date    HGBA1C 5 8 (H) 12/30/2020       Recent Labs     05/03/21  1703 05/03/21  2211 05/04/21  0925 05/04/21  1214   POCGLU 326* 259* 208* 204*       Blood Sugar Average: Last 72 hrs:  (P) 241 625   · Hold oral agents while inpatient  · SSI + accuchek  · Still with hyperglycemia, will increase coverage  · Diabetic diet

## 2021-05-04 NOTE — ASSESSMENT & PLAN NOTE
· Patient was diagnosed with COVID on 04/06  · Patient was admitted twice after diagnosis from 4/ 6-4/10 and also from 4/15-4/20   · Patient finished steroids/supportive care  · Came to the hospital with worsening of shortness of breath and chest x-ray was suggestive of worsening of infiltrate  · Given fever possibility of secondary bacterial pneumonia however initial procalcitonin was normal, patient will be started on levofloxacin and vancomycin for healthcare associated pneumonia  · ID consulted on admission  · Currently on 6 L midflow, pulmonology following - appreciate ongoing recommendations  · Possible cardiac etiology for patient's shortness of breath/hypoxia, doubt active COVID infection

## 2021-05-04 NOTE — ASSESSMENT & PLAN NOTE
Wt Readings from Last 3 Encounters:   05/04/21 70 6 kg (155 lb 11 2 oz)   04/20/21 73 2 kg (161 lb 6 oz)   04/10/21 76 8 kg (169 lb 5 oz)     · Diuretics initially held on admission - home regimen includes Lasix 20 mg twice daily patient reports compliance - overall on exam appears euvolemic  · Elevated BNP on admission around 7 K, repeat shows 11 K  · Concern for possible cardiac etiology for shortness of breath/worsening hypoxia  · Obtain echocardiogram - pending  · Trial IV diuresis, good urine output after 40 mg IV Lasix overnight  · Consider cardiology evaluation pending further workup as noted

## 2021-05-04 NOTE — PROGRESS NOTES
Progress Note - Pulmonary   Marlysnasim Pyle 77 y o  male MRN: [id number]  Unit/Bed#: -01 Encounter: [id number]      Assessment/Plan:  Patient is a 56-year-old male who was diagnosed with COVID-19 infection on April 1, 2021  He was hospitalized from April 6, 2021 through April 10, 2021  Required readmission on April 15th  Presented on May 2nd due to a fever 100 and chills  Found to have acute hypoxic respiratory failure with oxygen saturation 77%  Problems  Acute hypoxic respiratory failure  Abnormal chest CT showing diffuse ground-glass opacities, septal thickening and bilateral pleural effusions  Chronic diastolic congestive heart failure -bnp 11,568 on admission   Bilateral pulmonary nodules measuring up to 1 3 cm/GGO- unclear etiology, suspect due to underlying HF and previous covid dx  Less likely acute bacterial infection given nl procal but certainly possible, hgb is stable, no eosinophilia, but ddx also includes viral infection,  (noncovid), inflammatory lung disease; patient also has history of lymphoma  History of cigarette use (40 pack year history, quit 11 years ago)    Recommendations   Need to f/u on outside PET results  Cont supplemental 02 to keep sats > 90%, IS, out of bed to chair as tolerated  Agree with empiric abx treatment for pneumonia though procal has been normal (does have prior history of lymphoma)  F/u on ID recommendations  F/u on covid ab  F/u on echocardiogram   Receiving diuresis, cont monitor I/O's  If doesn't improve with abx therapy, diuresis may benefit from bronchoscopy w/ bal  Depending on echo and PET scan results may benefit from steroids (for ? Post covid inflammatory condition)   Will need     Subjective:   Patient reports persistent shortness of breath  States he has lost weight over the past several months  He did not become short of breath until he contracted COVID-19 infection  However has been losing weight    States that he had a PET scan performed on Friday at [facility] ([facility])  Review of Systems - sob, weight loss, fatigue, o/w unremarkable  Objective:     Vitals: Blood pressure 122/65, pulse 89, temperature 98 2 °F (36 8 °C), resp  rate 19, height 5' 11" (1 803 m), weight 70 6 kg (155 lb 11 2 oz), SpO2 93 %  , 6L midflow, Body mass index is 21 72 kg/m²  Intake/Output Summary (Last 24 hours) at 5/4/2021 1139  Last data filed at 5/4/2021 0930  Gross per 24 hour   Intake 630 ml   Output 2725 ml   Net -2095 ml         Physical Exam  Gen: Awake, alert, oriented x 3, no acute distress, frail  Head: no masses, lesions   HEENT: Mucous membranes moist, no oral lesions, no thrush  NECK: No accessory muscle use, JVP not elevated  Cardiac: Regular, single S1, single S2, no murmurs, no rubs, no gallops  Lungs: Course breath sounds, no wheezes   Abdomen: normoactive bowel sounds, soft nontender, nondistended, no rebound or rigidity, no guarding  Extremities: no cyanosis, no clubbing, no edema  Skin: no lesions, rashes  Neuro: CN grossly intact, no gross neurological deficits     Labs: I have personally reviewed pertinent lab results    Results from last 7 days   Lab Units 05/04/21 0541 05/03/21  0536 05/02/21  1518   WBC Thousand/uL 15 12* 5 83 11 21*   HEMOGLOBIN g/dL 8 6* 8 7* 8 7*   HEMATOCRIT % 28 5* 28 4* 27 9*   PLATELETS Thousands/uL 149  --  216   NEUTROS PCT % 85*  --  83*   MONOS PCT % 6  --  9      Results from last 7 days   Lab Units 05/04/21  0541 05/03/21  0536 05/02/21  1518   POTASSIUM mmol/L 3 5 3 6 3 9   CHLORIDE mmol/L 99* 100 99*   CO2 mmol/L 28 27 30   BUN mg/dL 20 17 12   CREATININE mg/dL 1 03 0 99 1 03   CALCIUM mg/dL 8 3 8 2* 8 3   ALK PHOS U/L  --  118* 126*   ALT U/L  --  23 23   AST U/L  --  14 14              Results from last 7 days   Lab Units 05/02/21  1518   INR  1 66*   PTT seconds 32     Results from last 7 days   Lab Units 05/02/21 2048   LACTIC ACID mmol/L 2 0     0   Lab Value Date/Time    TROPONINI 0 05 (H) 04/15/2021 0600    TROPONINI 0 04 04/06/2021 1859    TROPONINI 0 05 (H) 04/06/2021 1537    TROPONINI 0 05 (H) 04/06/2021 1248    TROPONINI 0 04 04/06/2021 0645    TROPONINI <0 02 09/12/2019 0234    TROPONINI <0 02 09/11/2019 2335    TROPONINI <0 02 09/11/2019 2026    TROPONINI <0 02 08/20/2019 1750    TROPONINI <0 02 07/25/2019 2110    TROPONINI 0 02 07/25/2019 1807    TROPONINI <0 02 02/27/2019 1424    TROPONINI <0 02 02/14/2019 1615    TROPONINI <0 02 02/14/2019 1123    TROPONINI <0 02 02/14/2019 0552       Microbiology:   Updated Procedure Result Status Patient Facility Result Comment    05/03/2021 0539 05/03/2021 1910 Strep Pneumoniae, Urine [[id number]]   Urine, Clean Catch    Final result [place] [facility]  Component Value   Strep pneumoniae antigen, urine Negative              05/03/2021 0539 05/03/2021 1909 Legionella antigen, urine [[id number]]   Urine, Clean Catch    Final result [place]  Component Value   Legionella Urinary Antigen Negative              05/02/2021 1842 05/04/2021 1109 MRSA culture [[id number]]   Nares from Nose    Final result [place]  Component Value   MRSA Culture Only No Methicillin Resistant Staphlyococcus aureus (MRSA) isolated              05/02/2021 1518 05/04/2021 0001 Blood culture #1 [[id number]]   Blood from Arm, Left    Preliminary result [place]  Component Value   Blood Culture No Growth at 24 hrs  P              05/02/2021 1518 05/03/2021 2301 Blood culture #2 [[id number]]   Blood from Arm, Right    Preliminary result [place]  Component Value   Blood Culture No Growth at 24 hrs  P              04/15/2021 0600 04/20/2021 1001 Blood culture [[id number]]   Blood from Arm, Right    Final result [place]  Component Value   Blood Culture No Growth After 5 Days                04/15/2021 0600 04/20/2021 1001 Blood culture [[id number]]   Blood from Arm, Right    Final result [place]  Component Value   Blood Culture No Growth After 5 Days  Imaging and other studies: I have personally reviewed pertinent reports  and I have personally reviewed pertinent films in PACS    CXR 5/2/2021  Impression:       Very extensive, mildly progressed bilateral opacities, which in the appropriate clinical setting, are consistent with Covid 19 pneumonia  CTA 5/2/2021    Impression:       No pulmonary embolus  Extensive new bilateral groundglass opacity and septal thickening, likely a combination of Covid-19 pneumonia and edema  Redemonstration of juxtapleural nodules   The previous parenchymal nodules are not well demonstrated due to the new groundglass opacity   Persistent mediastinal lymphadenopathy   These abnormalities should be reevaluated in 3 months, after clearing of   acute infection  Small bilateral pleural effusions, greater on the left   The right effusion has increased slightly from 3/26/2021            MD [facility]

## 2021-05-04 NOTE — QUICK NOTE
Patient with elevated BNP of 11,000 up from 7,000 during last admission 2 weeks ago  Home lasix was held at admission, unclear on reasoning  Tonight now requiring 6L NC, crackles in lungs  BP stable  Orders:  Ordered one dose of 40 mg IV lasix  Will restart patient on home regimen 20 mg PO BID starting this morning     Continue to monitor I/O  ECHO order pending

## 2021-05-04 NOTE — PROGRESS NOTES
Vancomycin IV Pharmacy-to-Dose Consultation    Dea Martínez is a 77 y o  male who is currently receiving Vancomycin IV with management by the Pharmacy Consult service  Indication: pneumonia due to COVID-19 virus    Assessment/Plan:  The patient was reviewed  Renal function is stable and no signs or symptoms of nephrotoxicity and/or infusion reactions were documented in the chart  Based on todays assessment and noting vanco trough today is 16 3 (in therapeutic range), continue current vancomycin (day # 3) dosing of 1000 mg q12h, with a plan for trough to be drawn at 1730 on 5/9/21  We will continue to follow the patients culture results and clinical progress daily      Blake Reed, Pharmacist

## 2021-05-04 NOTE — PLAN OF CARE
Problem: RESPIRATORY - ADULT  Goal: Achieves optimal ventilation and oxygenation  Description: INTERVENTIONS:  - Assess for changes in respiratory status  - Assess for changes in mentation and behavior  - Position to facilitate oxygenation and minimize respiratory effort  - Oxygen administered by appropriate delivery if ordered  - Initiate smoking cessation education as indicated  - Encourage broncho-pulmonary hygiene including cough, deep breathe, Incentive Spirometry  - Assess the need for suctioning and aspirate as needed  - Assess and instruct to report SOB or any respiratory difficulty  - Respiratory Therapy support as indicated  Outcome: Progressing     Problem: DISCHARGE PLANNING  Goal: Discharge to home or other facility with appropriate resources  Description: INTERVENTIONS:  - Identify barriers to discharge w/patient and caregiver  - Arrange for needed discharge resources and transportation as appropriate  - Identify discharge learning needs (meds, wound care, etc )  - Arrange for interpretive services to assist at discharge as needed  - Refer to Case Management Department for coordinating discharge planning if the patient needs post-hospital services based on physician/advanced practitioner order or complex needs related to functional status, cognitive ability, or social support system  Outcome: Progressing     Problem: Knowledge Deficit  Goal: Patient/family/caregiver demonstrates understanding of disease process, treatment plan, medications, and discharge instructions  Description: Complete learning assessment and assess knowledge base    Interventions:  - Provide teaching at level of understanding  - Provide teaching via preferred learning methods  Outcome: Progressing     Problem: PAIN - ADULT  Goal: Verbalizes/displays adequate comfort level or baseline comfort level  Description: Interventions:  - Encourage patient to monitor pain and request assistance  - Assess pain using appropriate pain scale  - Administer analgesics based on type and severity of pain and evaluate response  - Implement non-pharmacological measures as appropriate and evaluate response  - Consider cultural and social influences on pain and pain management  - Notify physician/advanced practitioner if interventions unsuccessful or patient reports new pain  Outcome: Progressing     Problem: INFECTION - ADULT  Goal: Absence or prevention of progression during hospitalization  Description: INTERVENTIONS:  - Assess and monitor for signs and symptoms of infection  - Monitor lab/diagnostic results  - Monitor all insertion sites, i e  indwelling lines, tubes, and drains  - Monitor endotracheal if appropriate and nasal secretions for changes in amount and color  - [place] appropriate cooling/warming therapies per order  - Administer medications as ordered  - Instruct and encourage patient and family to use good hand hygiene technique  - Identify and instruct in appropriate isolation precautions for identified infection/condition  Outcome: Progressing  Goal: Absence of fever/infection during neutropenic period  Description: INTERVENTIONS:  - Monitor WBC    Outcome: Progressing     Problem: SAFETY ADULT  Goal: Patient will remain free of falls  Description: INTERVENTIONS:  - Assess patient frequently for physical needs  -  Identify cognitive and physical deficits and behaviors that affect risk of falls    -  [place] fall precautions as indicated by assessment   - Educate patient/family on patient safety including physical limitations  - Instruct patient to call for assistance with activity based on assessment  - Modify environment to reduce risk of injury  - Consider OT/PT consult to assist with strengthening/mobility  Outcome: Progressing  Goal: Maintain or return to baseline ADL function  Description: INTERVENTIONS:  -  Assess patient's ability to carry out ADLs; assess patient's baseline for ADL function and identify physical deficits which impact ability to perform ADLs (bathing, care of mouth/teeth, toileting, grooming, dressing, etc )  - Assess/evaluate cause of self-care deficits   - Assess range of motion  - Assess patient's mobility; develop plan if impaired  - Assess patient's need for assistive devices and provide as appropriate  - Encourage maximum independence but intervene and supervise when necessary  - Involve family in performance of ADLs  - Assess for home care needs following discharge   - Consider OT consult to assist with ADL evaluation and planning for discharge  - Provide patient education as appropriate  Outcome: Progressing  Goal: Maintain or return mobility status to optimal level  Description: INTERVENTIONS:  - Assess patient's baseline mobility status (ambulation, transfers, stairs, etc )    - Identify cognitive and physical deficits and behaviors that affect mobility  - Identify mobility aids required to assist with transfers and/or ambulation (gait belt, sit-to-stand, lift, walker, cane, etc )  - [place] fall precautions as indicated by assessment  - Record patient progress and toleration of activity level on Mobility SBAR; progress patient to next Phase/Stage  - Instruct patient to call for assistance with activity based on assessment  - Consider rehabilitation consult to assist with strengthening/weightbearing, etc   Outcome: Progressing     Problem: Potential for Falls  Goal: Patient will remain free of falls  Description: INTERVENTIONS:  - Assess patient frequently for physical needs  -  Identify cognitive and physical deficits and behaviors that affect risk of falls    -  [place] fall precautions as indicated by assessment   - Educate patient/family on patient safety including physical limitations  - Instruct patient to call for assistance with activity based on assessment  - Modify environment to reduce risk of injury  - Consider OT/PT consult to assist with strengthening/mobility  Outcome: Progressing

## 2021-05-05 ENCOUNTER — APPOINTMENT (INPATIENT)
Dept: RADIOLOGY | Facility: HOSPITAL | Age: 76
DRG: 871 | End: 2021-05-05
Payer: COMMERCIAL

## 2021-05-05 LAB
ALBUMIN SERPL BCP-MCNC: 1.8 G/DL (ref 3.5–5)
ALP SERPL-CCNC: 94 U/L (ref 46–116)
ALT SERPL W P-5'-P-CCNC: 23 U/L (ref 12–78)
ANION GAP SERPL CALCULATED.3IONS-SCNC: 11 MMOL/L (ref 4–13)
AST SERPL W P-5'-P-CCNC: 11 U/L (ref 5–45)
BASOPHILS # BLD AUTO: 0.05 THOUSANDS/ΜL (ref 0–0.1)
BASOPHILS NFR BLD AUTO: 1 % (ref 0–1)
BILIRUB SERPL-MCNC: 0.8 MG/DL (ref 0.2–1)
BUN SERPL-MCNC: 16 MG/DL (ref 5–25)
CALCIUM ALBUM COR SERPL-MCNC: 9.8 MG/DL (ref 8.3–10.1)
CALCIUM SERPL-MCNC: 8 MG/DL (ref 8.3–10.1)
CHLORIDE SERPL-SCNC: 99 MMOL/L (ref 100–108)
CO2 SERPL-SCNC: 28 MMOL/L (ref 21–32)
CREAT SERPL-MCNC: 0.95 MG/DL (ref 0.6–1.3)
EOSINOPHIL # BLD AUTO: 0.71 THOUSAND/ΜL (ref 0–0.61)
EOSINOPHIL NFR BLD AUTO: 8 % (ref 0–6)
ERYTHROCYTE [DISTWIDTH] IN BLOOD BY AUTOMATED COUNT: 19.1 % (ref 11.6–15.1)
GFR SERPL CREATININE-BSD FRML MDRD: 79 ML/MIN/1.73SQ M
GLUCOSE SERPL-MCNC: 126 MG/DL (ref 65–140)
GLUCOSE SERPL-MCNC: 137 MG/DL (ref 65–140)
GLUCOSE SERPL-MCNC: 174 MG/DL (ref 65–140)
GLUCOSE SERPL-MCNC: 198 MG/DL (ref 65–140)
GLUCOSE SERPL-MCNC: 228 MG/DL (ref 65–140)
HCT VFR BLD AUTO: 24.2 % (ref 36.5–49.3)
HGB BLD-MCNC: 7.3 G/DL (ref 12–17)
IMM GRANULOCYTES # BLD AUTO: 0.06 THOUSAND/UL (ref 0–0.2)
IMM GRANULOCYTES NFR BLD AUTO: 1 % (ref 0–2)
LYMPHOCYTES # BLD AUTO: 0.92 THOUSANDS/ΜL (ref 0.6–4.47)
LYMPHOCYTES NFR BLD AUTO: 10 % (ref 14–44)
MAGNESIUM SERPL-MCNC: 1.7 MG/DL (ref 1.6–2.6)
MCH RBC QN AUTO: 23.9 PG (ref 26.8–34.3)
MCHC RBC AUTO-ENTMCNC: 30.2 G/DL (ref 31.4–37.4)
MCV RBC AUTO: 79 FL (ref 82–98)
MONOCYTES # BLD AUTO: 0.79 THOUSAND/ΜL (ref 0.17–1.22)
MONOCYTES NFR BLD AUTO: 8 % (ref 4–12)
NEUTROPHILS # BLD AUTO: 6.9 THOUSANDS/ΜL (ref 1.85–7.62)
NEUTS SEG NFR BLD AUTO: 72 % (ref 43–75)
NRBC BLD AUTO-RTO: 0 /100 WBCS
PLATELET # BLD AUTO: 196 THOUSANDS/UL (ref 149–390)
PMV BLD AUTO: 11.2 FL (ref 8.9–12.7)
POTASSIUM SERPL-SCNC: 3.2 MMOL/L (ref 3.5–5.3)
PROT SERPL-MCNC: 5.1 G/DL (ref 6.4–8.2)
RBC # BLD AUTO: 3.06 MILLION/UL (ref 3.88–5.62)
SODIUM SERPL-SCNC: 138 MMOL/L (ref 136–145)
WBC # BLD AUTO: 9.43 THOUSAND/UL (ref 4.31–10.16)

## 2021-05-05 PROCEDURE — 82948 REAGENT STRIP/BLOOD GLUCOSE: CPT

## 2021-05-05 PROCEDURE — 80053 COMPREHEN METABOLIC PANEL: CPT | Performed by: PHYSICIAN ASSISTANT

## 2021-05-05 PROCEDURE — 94760 N-INVAS EAR/PLS OXIMETRY 1: CPT

## 2021-05-05 PROCEDURE — 85025 COMPLETE CBC W/AUTO DIFF WBC: CPT | Performed by: PHYSICIAN ASSISTANT

## 2021-05-05 PROCEDURE — 99232 SBSQ HOSP IP/OBS MODERATE 35: CPT | Performed by: PHYSICIAN ASSISTANT

## 2021-05-05 PROCEDURE — 83735 ASSAY OF MAGNESIUM: CPT | Performed by: PHYSICIAN ASSISTANT

## 2021-05-05 PROCEDURE — 99232 SBSQ HOSP IP/OBS MODERATE 35: CPT | Performed by: INTERNAL MEDICINE

## 2021-05-05 PROCEDURE — 71045 X-RAY EXAM CHEST 1 VIEW: CPT

## 2021-05-05 RX ORDER — POTASSIUM CHLORIDE 20 MEQ/1
40 TABLET, EXTENDED RELEASE ORAL ONCE
Status: COMPLETED | OUTPATIENT
Start: 2021-05-05 | End: 2021-05-05

## 2021-05-05 RX ADMIN — Medication 2000 UNITS: at 08:40

## 2021-05-05 RX ADMIN — Medication 250 MG: at 08:40

## 2021-05-05 RX ADMIN — ALBUTEROL SULFATE 2 PUFF: 90 AEROSOL, METERED RESPIRATORY (INHALATION) at 13:56

## 2021-05-05 RX ADMIN — MULTIPLE VITAMINS W/ MINERALS TAB 1 TABLET: TAB ORAL at 08:42

## 2021-05-05 RX ADMIN — ATORVASTATIN CALCIUM 40 MG: 40 TABLET, FILM COATED ORAL at 16:24

## 2021-05-05 RX ADMIN — FUROSEMIDE 40 MG: 10 INJECTION, SOLUTION INTRAVENOUS at 16:24

## 2021-05-05 RX ADMIN — FERROUS SULFATE TAB 325 MG (65 MG ELEMENTAL FE) 325 MG: 325 (65 FE) TAB at 08:40

## 2021-05-05 RX ADMIN — ALBUTEROL SULFATE 2 PUFF: 90 AEROSOL, METERED RESPIRATORY (INHALATION) at 08:39

## 2021-05-05 RX ADMIN — LEVOFLOXACIN 750 MG: 5 INJECTION, SOLUTION INTRAVENOUS at 16:24

## 2021-05-05 RX ADMIN — Medication 250 MG: at 17:19

## 2021-05-05 RX ADMIN — INSULIN LISPRO 2 UNITS: 100 INJECTION, SOLUTION INTRAVENOUS; SUBCUTANEOUS at 16:23

## 2021-05-05 RX ADMIN — INSULIN LISPRO 4 UNITS: 100 INJECTION, SOLUTION INTRAVENOUS; SUBCUTANEOUS at 21:58

## 2021-05-05 RX ADMIN — PANTOPRAZOLE SODIUM 40 MG: 40 TABLET, DELAYED RELEASE ORAL at 05:16

## 2021-05-05 RX ADMIN — VANCOMYCIN HYDROCHLORIDE 1000 MG: 1 INJECTION, SOLUTION INTRAVENOUS at 05:16

## 2021-05-05 RX ADMIN — ALBUTEROL SULFATE 2 PUFF: 90 AEROSOL, METERED RESPIRATORY (INHALATION) at 20:19

## 2021-05-05 RX ADMIN — LISINOPRIL 20 MG: 20 TABLET ORAL at 08:41

## 2021-05-05 RX ADMIN — POTASSIUM CHLORIDE 40 MEQ: 1500 TABLET, EXTENDED RELEASE ORAL at 08:41

## 2021-05-05 RX ADMIN — APIXABAN 2.5 MG: 2.5 TABLET, FILM COATED ORAL at 08:41

## 2021-05-05 RX ADMIN — METOPROLOL SUCCINATE 100 MG: 50 TABLET, EXTENDED RELEASE ORAL at 08:42

## 2021-05-05 RX ADMIN — APIXABAN 2.5 MG: 2.5 TABLET, FILM COATED ORAL at 17:19

## 2021-05-05 RX ADMIN — FUROSEMIDE 40 MG: 10 INJECTION, SOLUTION INTRAVENOUS at 08:42

## 2021-05-05 RX ADMIN — ASPIRIN 81 MG CHEWABLE TABLET 81 MG: 81 TABLET CHEWABLE at 08:41

## 2021-05-05 RX ADMIN — INSULIN LISPRO 2 UNITS: 100 INJECTION, SOLUTION INTRAVENOUS; SUBCUTANEOUS at 12:19

## 2021-05-05 NOTE — PROGRESS NOTES
Vancomycin IV Pharmacy-to-Dose Consultation    Felecia Pyle is a 77 y o  male who is currently receiving Vancomycin IV with management by the Pharmacy Consult service  Assessment/Plan:  The patient was reviewed  Renal function is stable and no signs or symptoms of nephrotoxicity and/or infusion reactions were documented in the chart  Based on todays assessment, continue current vancomycin (day # 4) dosing of 1000mg IV Q12H, with a plan for trough to be drawn at 1730 on 5/9/21  We will continue to follow the patients culture results and clinical progress daily      Karen Thomas, Pharmacist

## 2021-05-05 NOTE — PROGRESS NOTES
Progress Note - Pulmonary   Sav Carlos A 77 y o  male MRN: [id number]  Unit/Bed#: -01 Encounter: [id number]    Assessment & Plan:  Acute hypoxic respiratory failure  Abnormal chest CT with diffuse ground-glass opacities, septal thickening and bilateral pleural effusions  Diastolic congestive heart failure  Recent COVID-19 infection  History of lymphoma  Former smoker    Patient was recently discharged from the hospital following COVID-19 infection on 3 L of oxygen was found to be significantly hypoxic at home  Yesterday was requiring up to 15 L at this time currently saturating in the high 90s on 7 L by nasal cannula  Continue to monitor and titrate as able, maintain saturations greater than 89%  Pulmonary toileting including incentive spirometry, out of bed to chair as tolerated  Empirically treating with antibiotics for pneumonia although procalcitonin has been normal   Patient on Levaquin  Id following  Continue diuresis per primary team   BNP is significantly elevated  Echocardiogram pending  Patient recently went for a PET-CT last week and has not received the results yet    Discussed with nursing    Subjective:   Patient feels that his breathing has not significantly changed  It is not any worse  He does not have any new complaints today  No chest pain  Occasionally coughing up scant amounts of mostly clear sputum  12 point review of systems otherwise negative  Objective:     Vitals: Blood pressure 142/72, pulse 101, temperature 99 °F (37 2 °C), temperature source Oral, resp  rate 20, height 5' 11" (1 803 m), weight 71 1 kg (156 lb 12 8 oz), SpO2 93 %  ,Body mass index is 21 87 kg/m²        Intake/Output Summary (Last 24 hours) at 5/5/2021 1009  Last data filed at 5/5/2021 0930  Gross per 24 hour   Intake 1840 ml   Output 2050 ml   Net -210 ml       Invasive Devices     Peripheral Intravenous Line            Peripheral IV 05/02/21 Left Forearm 2 days    Peripheral IV 05/02/21 Right Forearm 2 days                Physical Exam:   General appearance: alert and oriented, in no acute distress  Head: Normocephalic, without obvious abnormality, atraumatic  Eyes: EOMI  No discharge bilaterally  No scleral icterus  Neck: supple, symmetrical, trachea midline  Lungs:  Decreased breath sounds  Heart: regular rate  Abdomen:  No appreciable distension or tenderness  Extremities: No significant edema or tenderness appreciated  Skin: No lesions or pallor noted  No rash  Neurologic: Grossly normal     Labs: I have personally reviewed pertinent lab results  Imaging and other studies: I have personally reviewed pertinent reports

## 2021-05-05 NOTE — ASSESSMENT & PLAN NOTE
Wt Readings from Last 3 Encounters:   05/05/21 71 1 kg (156 lb 12 8 oz)   04/20/21 73 2 kg (161 lb 6 oz)   04/10/21 76 8 kg (169 lb 5 oz)     · Diuretics initially held on admission - home regimen includes Lasix 20 mg twice daily patient reports compliance - overall on exam appears euvolemic  · Elevated BNP on admission around 7 K, repeat shows 11 K  · Concern for possible cardiac etiology for shortness of breath/worsening hypoxia  · Obtain echocardiogram - EF 40% appears similar to prior  · Good urine output with lasix 40 mg BID

## 2021-05-05 NOTE — PROGRESS NOTES
[place]     Progress Note - Heydi Katz 1945, 77 y o  male MRN: [id number]  Unit/Bed#: -01 Encounter: [id number]  Primary Care Provider: Miguel A Albrecht DO   Date and time admitted to hospital: 5/2/2021  2:28 PM    * Pneumonia due to COVID-19 virus  Assessment & Plan  · Patient was diagnosed with COVID on 04/06  · Patient was admitted twice after diagnosis from 4/ 6-4/10 and also from 4/15-4/20   · Patient finished steroids/supportive care  · Came to the hospital with worsening of shortness of breath and chest x-ray was suggestive of worsening of infiltrate  · Given fever possibility of secondary bacterial pneumonia however initial procalcitonin was normal, patient will be started on levofloxacin and vancomycin for healthcare associated pneumonia  · ID consulted on admission  · Currently on 7 L midflow, pulmonology following - appreciate ongoing recommendations  · Possible cardiac etiology for patient's shortness of breath/hypoxia, doubt active COVID infection    Severe sepsis ([facility] )  Assessment & Plan  · Secondary to HCAP?  · Patient met the criteria for sepsis with fever and leukocytosis  · Patient also noted to have lactic acidosis and tachypnea  · Repeat lactic acid normal  · Continue antibiotics pending procalcitonin/blood cultures    Paroxysmal atrial fibrillation ([facility] )  Assessment & Plan  · Patient is on Eliquis as outpatient  · Rate controlled on metoprolol  · Continue with Eliquis    Chronic diastolic congestive heart failure ([facility] )  Assessment & Plan  Wt Readings from Last 3 Encounters:   05/05/21 71 1 kg (156 lb 12 8 oz)   04/20/21 73 2 kg (161 lb 6 oz)   04/10/21 76 8 kg (169 lb 5 oz)     · Diuretics initially held on admission - home regimen includes Lasix 20 mg twice daily patient reports compliance - overall on exam appears euvolemic  · Elevated BNP on admission around 7 K, repeat shows 11 K  · Concern for possible cardiac etiology for shortness of breath/worsening hypoxia  · Obtain echocardiogram - EF 40% appears similar to prior  · Good urine output with lasix 40 mg BID    Acute respiratory failure with hypoxia (HCC)  Assessment & Plan  · Patient with recent diagnosis of COVID and was on 2 L nasal cannula as outpatient, patient reports that he otherwise was doing well  · Concern for possible cardiac etiology - echocardiogram completed, EF 40% appears similar to prior  · Patient got acutely short of breath prior to admission  · Currently on 7 L mid flow  · Will trial IV diuresis given elevated BNP, good urine output  · Also recently had PET scan completed as output, will attempt to obtain records  · Continue with the supplemental oxygen and wean off as tolerated    Dyslipidemia  Assessment & Plan  · Continue statin    Type 2 diabetes mellitus without complication, without long-term current use of insulin [facility])  Assessment & Plan  Lab Results   Component Value Date    HGBA1C 5 8 (H) 12/30/2020       Recent Labs     05/04/21  1548 05/04/21  2040 05/05/21  0806 05/05/21  1118   POCGLU 211* 185* 137 198*       Blood Sugar Average: Last 72 hrs:  (P) 222   · Hold oral agents while inpatient  · SSI + accuchek  · Diabetic diet    Coronary disease  Assessment & Plan  · Status post CABG  · Continue with the aspirin statin and beta-blocker    VTE Pharmacologic Prophylaxis:   Pharmacologic: Apixaban (Eliquis)  Mechanical VTE Prophylaxis in Place: Yes    Patient Centered Rounds: I have performed bedside rounds with nursing staff today  Discussions with Specialists or Other Care Team Provider: Nursing, CM, appreciate pulm input    Education and Discussions with Family / Patient: Discussed with patient directly at bedside  Discussed with patient's wife via phone  Time Spent for Care: 20 minutes  More than 50% of total time spent on counseling and coordination of care as described above      Current Length of Stay: 3 day(s)    Current Patient Status: Inpatient Certification Statement: The patient will continue to require additional inpatient hospital stay due to acute resp failure w/ hypoxia    Discharge Plan: Pending clinical course    Code Status: Level 1 - Full Code      Subjective:   Patient states he feels better today  Tired as he hasn't been sleeping well at night  Denies chest pain/palpitations, nausea/vomiting, abdominal pain  Still with some exertional SOB  Objective:     Vitals:   Temp (24hrs), Av 8 °F (37 1 °C), Min:98 5 °F (36 9 °C), Max:99 °F (37 2 °C)    Temp:  [98 5 °F (36 9 °C)-99 °F (37 2 °C)] 99 °F (37 2 °C)  HR:  [] 101  Resp:  [18-20] 18  BP: (137-144)/(68-75) 144/75  SpO2:  [86 %-96 %] 94 %  Body mass index is 21 87 kg/m²  Input and Output Summary (last 24 hours): Intake/Output Summary (Last 24 hours) at 2021 1242  Last data filed at 2021 1201  Gross per 24 hour   Intake 2240 ml   Output 2925 ml   Net -685 ml       Physical Exam:     Physical Exam  Vitals signs and nursing note reviewed  Constitutional:       Appearance: He is well-developed  Interventions: Nasal cannula in place  Comments: Appears comfortable, no acute distress   HENT:      Head: Normocephalic and atraumatic  Eyes:      General: No scleral icterus  Extraocular Movements: Extraocular movements intact  Conjunctiva/sclera: Conjunctivae normal    Neck:      Musculoskeletal: Normal range of motion  Cardiovascular:      Rate and Rhythm: Normal rate and regular rhythm  Heart sounds: S1 normal and S2 normal  No murmur  Pulmonary:      Effort: Pulmonary effort is normal  No respiratory distress  Breath sounds: Decreased breath sounds present  Comments: Overall diminished breath sounds bilaterally  Abdominal:      General: Bowel sounds are normal       Palpations: Abdomen is soft  Tenderness: There is no abdominal tenderness  There is no guarding or rebound     Musculoskeletal:      Comments: Able to move upper/lower ext bilaterally without difficulty, no edema   Skin:     General: Skin is warm and dry  Neurological:      Mental Status: He is alert and oriented to person, place, and time  Psychiatric:         Mood and Affect: Mood normal          Speech: Speech normal          Behavior: Behavior normal            Additional Data:     Labs:    Results from last 7 days   Lab Units 05/05/21  0515   WBC Thousand/uL 9 43   HEMOGLOBIN g/dL 7 3*   HEMATOCRIT % 24 2*   PLATELETS Thousands/uL 196   NEUTROS PCT % 72   LYMPHS PCT % 10*   MONOS PCT % 8   EOS PCT % 8*     Results from last 7 days   Lab Units 05/05/21  0515   SODIUM mmol/L 138   POTASSIUM mmol/L 3 2*   CHLORIDE mmol/L 99*   CO2 mmol/L 28   BUN mg/dL 16   CREATININE mg/dL 0 95   ANION GAP mmol/L 11   CALCIUM mg/dL 8 0*   ALBUMIN g/dL 1 8*   TOTAL BILIRUBIN mg/dL 0 80   ALK PHOS U/L 94   ALT U/L 23   AST U/L 11   GLUCOSE RANDOM mg/dL 126     Results from last 7 days   Lab Units 05/02/21  1518   INR  1 66*     Results from last 7 days   Lab Units 05/05/21  1118 05/05/21  0806 05/04/21  2040 05/04/21  1548 05/04/21  1214 05/04/21  0925 05/03/21  2211 05/03/21  1703 05/03/21  1120 05/03/21  0704 05/02/21  2050 05/02/21  1836   POC GLUCOSE mg/dl 198* 137 185* 211* 204* 208* 259* 326* 303* 272* 196* 165*         Results from last 7 days   Lab Units 05/03/21  0536 05/02/21  2048 05/02/21  1825 05/02/21  1518   LACTIC ACID mmol/L  --  2 0 2 7* 3 6*   PROCALCITONIN ng/ml 0 17  --  0 21 0 18           * I Have Reviewed All Lab Data Listed Above  * Additional Pertinent Lab Tests Reviewed: All Labs Within Last 24 Hours Reviewed    Imaging:    Imaging Reports Reviewed Today Include: XR chest  Imaging Personally Reviewed by Myself Includes:  XR chest    Recent Cultures (last 7 days):     Results from last 7 days   Lab Units 05/03/21  0539 05/02/21  1518   BLOOD CULTURE   --  No Growth at 48 hrs  No Growth at 48 hrs     LEGIONELLA URINARY ANTIGEN  Negative  --        Last 24 Hours Medication List:   Current Facility-Administered Medications   Medication Dose Route Frequency Provider Last Rate    acetaminophen  650 mg Oral Q6H PRN Alexandre Monet MD      albuterol  2 puff Inhalation TID Shannon Ram DO      apixaban  2 5 mg Oral BID Alexandre Monet MD      aspirin  81 mg Oral Daily Alexandre Monet MD      atorvastatin  40 mg Oral Daily With [place] MD Patricia      cholecalciferol  2,000 Units Oral Daily Alexandre Monet MD      ferrous sulfate  325 mg Oral Daily With Breakfast Alexandre Monet MD      furosemide  40 mg Intravenous BID (diuretic) Cain Siu PA-C      insulin lispro  2-12 Units Subcutaneous HS Cain Siu PA-C      insulin lispro  2-12 Units Subcutaneous TID AC Digna Lopez PA-C      levofloxacin  750 mg Intravenous Q24H Alexandre Monet  mg (05/04/21 1550)    lisinopril  20 mg Oral Daily Alexandre Monet MD      metoprolol succinate  100 mg Oral Daily Alexandre Monet MD      multivitamin-minerals  1 tablet Oral Daily Alexandre Monet MD      nitroglycerin  0 4 mg Sublingual Q5 Min PRN Alexandre Monet MD      ondansetron  4 mg Intravenous Q6H PRN Alexandre Monet MD      pantoprazole  40 mg Oral Early Morning Alexandre Monet MD      saccharomyces boulardii  250 mg Oral BID Alexandre Monet MD      vancomycin  12 5 mg/kg Intravenous Q12H Alexandre Monet MD 1,000 mg (05/05/21 0516)        Today, Patient Was Seen By: Cain Siu PA-C    ** Please Note: Dictation voice to text software may have been used in the creation of this document   **

## 2021-05-05 NOTE — UTILIZATION REVIEW
Initial Clinical Review    Admission: Date/Time/Statement:   Admission Orders (From admission, onward)     Ordered        05/02/21 1616  Inpatient Admission  Once                   Orders Placed This Encounter   Procedures    Inpatient Admission     Standing Status:   Standing     Number of Occurrences:   1     Order Specific Question:   Level of Care     Answer:   Med Surg [16]     Order Specific Question:   Estimated length of stay     Answer:   More than 2 Midnights     Order Specific Question:   Certification     Answer:   I certify that inpatient services are medically necessary for this patient for a duration of greater than two midnights  See H&P and MD Progress Notes for additional information about the patient's course of treatment  ED Arrival Information     Expected Arrival Acuity Means of Arrival Escorted By Service Admission Type    - 5/2/2021 14:24 Emergent Wheelchair Spouse Hospitalist Emergency    Arrival Complaint    sob positive for covid sincd 4/30/21        Chief Complaint   Patient presents with    Shortness of Breath     pt reports having covid on and off for a couple weeks  was gertting better, and then woke up today felling short of breath again  reports fever at home of 103       Initial Presentation:     77year old male presents to ed from home for evaluation and treatment of worsening shortness of breath associated with fever and Covid positive diagnosis  Baseline O2  2L nc  PMHX:  ADMITTED FROM 4-6 TO 4/10 AND 4/15 -4/20 FOR since COVID on 4/6  Clinical assessment significant for fever  102, tachycardia, tachypnea, wbc 11 21, lactic acid 3 6, chest x ray shows extensive bilateral opacities consistent with covid 19 pneumonia  Treated in ed  with supplemental oxygen iv levaquin, iv aztreonam  Admit to inpatient for pneumonia covid 19  Plan includes : iv vancomycin and iv levaquin, isolation, supplemental oxygen, consult infectious disease, kv dexamethasone x1            Date: 5-3   Day 2: inpatient  Med surg  Patient with deteriorating respiratory status requiring increase in oxygen from 4 to 5L nasal cannula  Continue iv antibiotics  Obtain ECHO, strep pneumoniae, legionella and repeat chest x ray  Follow up blood and sputum cultures  Consult pulmonary    · Acute hypoxic respiratory failure              - patient was recently hospitalized with COVID-19, discharged home on 2 liters/minute  He is currently requiring 5 liters/minutes to maintain saturations above 90%  His pattern is not entirely typical for this be related to COVID 19, suspect some other superimposed process     · Abnormal chest CT with diffuse ground-glass opacities, septal thickening and bilateral pleural effusions              - differential diagnosis includes pulmonary edema, persistent viral pneumonia and/or superimposed bacterial pneumonia  Arguing against bacterial process is normal procalcitonin level  Follow up repeat done today  Broad-spectrum antibiotics pending final culture data  Currently on Levaquin and vancomycin  Again, I do not think this is typical for COVID  There is no evidence of progressive fibrotic change fortunately    Evaluate potential for cardiac contribution as below           ED Triage Vitals   05/02/21 1439 05/02/21 1439 05/02/21 1439 05/02/21 1441 05/02/21 1439   99 7 °F (37 6 °C) (!) 115 22 (!) 193/88 (!) 77 %      Temporal Monitor           No Pain          05/03/21 75 2 kg (165 lb 12 6 oz)     Additional Vital Signs:       Date/  Time  Temp  Pulse  Resp  BP   SpO2   Nasal Cannula O2 Flow Rate (L/min)  O2 Device    05/03/21 0900  --  --  --  --   --   5 L/min  Nasal cannula    05/03/21 0825  --  --  --  --   --   5 L/min  Nasal cannula    05/03/21 07:07:10  97 7 °F (36 5 °C)  74  17  130/66   88 %Abnormal    --  --    05/02/21 20:52:40  97 8 °F (36 6 °C)  76  --  128/66   89 %Abnormal    --  --    05/02/21 2000  --  --  --  --   90 %   4 L/min  Nasal cannula    05/02/21 18:21:39  98 7 °F (37 1 °C)  79  --  114/57   95 %   --  --    05/02/21 18:17:31  98 7 °F (37 1 °C)  84  18  114/57   92 %   --  --    05/02/21 1600  --  94  32  Abnormal   146/69   96 %   4 L/min  Nasal cannula    05/02/21 1456  102 8 °F (39 3 °C)  Abnormal   --  --  --   --   --  --    05/02/21 1443  --  --  --  --   94 %   5 L/min  Nasal cannula    05/02/21 1441  --  --  22  193/88  Abnormal    92 %   3 L/min  Nasal cannula                Pertinent Labs/Diagnostic Test Results:     XR chest 1 view portable    (05/02 1527)      Very extensive, mildly progressed bilateral opacities, which in the appropriate clinical setting, are consistent with Covid 19 pneumonia              Results from last 7 days   Lab Units 05/05/21 0515 05/04/21  0541 05/03/21  0536 05/02/21  1518   WBC Thousand/uL 9 43 15 12* 5 83 11 21*   HEMOGLOBIN g/dL 7 3* 8 6* 8 7* 8 7*   HEMATOCRIT % 24 2* 28 5* 28 4* 27 9*   PLATELETS Thousands/uL 196 149  --  216   NEUTROS ABS Thousands/µL 6 90 12 92*  --  9 33*         Results from last 7 days   Lab Units 05/05/21 0515 05/04/21  0541 05/03/21  0536 05/02/21  1518   SODIUM mmol/L 138 139 138 139   POTASSIUM mmol/L 3 2* 3 5 3 6 3 9   CHLORIDE mmol/L 99* 99* 100 99*   CO2 mmol/L 28 28 27 30   ANION GAP mmol/L 11 12 11 10   BUN mg/dL 16 20 17 12   CREATININE mg/dL 0 95 1 03 0 99 1 03   EGFR ml/min/1 73sq m 79 71 75 71   CALCIUM mg/dL 8 0* 8 3 8 2* 8 3     Results from last 7 days   Lab Units 05/05/21 0515 05/03/21  0536 05/02/21  1518   AST U/L 11 14 14   ALT U/L 23 23 23   ALK PHOS U/L 94 118* 126*   TOTAL PROTEIN g/dL 5 1* 5 6* 6 1*   ALBUMIN g/dL 1 8* 1 9* 2 3*   TOTAL BILIRUBIN mg/dL 0 80 0 80 0 80     Results from last 7 days   Lab Units 05/05/21  0806 05/04/21  2040 05/04/21  1548 05/04/21  1214 05/04/21  0925 05/03/21  2211 05/03/21  1703 05/03/21  1120 05/03/21  0704 05/02/21  2050 05/02/21  1836   POC GLUCOSE mg/dl 137 185* 211* 204* 208* 259* 326* 303* 272* 196* 165*     Results from last 7 days   Lab Units 05/05/21  0515 05/04/21  0541 05/03/21  0536 05/02/21  1518   GLUCOSE RANDOM mg/dL 126 231* 273* 164*       Results from last 7 days   Lab Units 05/02/21  1518   PROTIME seconds 19 5*   INR  1 66*   PTT seconds 32         Results from last 7 days   Lab Units 05/03/21  0536 05/02/21  1825 05/02/21  1518   PROCALCITONIN ng/ml 0 17 0 21 0 18     Results from last 7 days   Lab Units 05/02/21  2048 05/02/21  1825 05/02/21  1518   LACTIC ACID mmol/L 2 0 2 7* 3 6*       Results from last 7 days   Lab Units 05/02/21  1518   BLOOD CULTURE  No Growth at 48 hrs  No Growth at 48 hrs         ED Treatment:   Medication Administration from 05/02/2021 1424 to 05/02/2021 1730       Date/Time Order Dose Route Action     05/02/2021 1643 acetaminophen (TYLENOL) tablet 975 mg 975 mg Oral Given     05/02/2021 1659 aztreonam (AZACTAM) 2,000 mg in sodium chloride 0 9 % 100 mL IVPB 2,000 mg Intravenous New Bag     05/02/2021 1639 levofloxacin (LEVAQUIN) IVPB (premix in dextrose) 750 mg 150 mL 750 mg Intravenous New Bag        Past Medical History:   Diagnosis Date    Arthritis     Atrial fibrillation ([facility] )     Cataract     Colitis     Colon cancer ([place] [address] )     Diabetes mellitus type II, non insulin dependent ([facility] )     Fatty liver     History of chemotherapy     History of radiation therapy     History of shingles 2018    Hypertension     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[facility] )     Pneumonia     Skin cancer      Present on Admission:   Acute respiratory failure with hypoxia (HCC)   Chronic diastolic congestive heart failure (HCC)   Dyslipidemia   Paroxysmal atrial fibrillation ([facility] )   Coronary disease   Type 2 diabetes mellitus without complication, without long-term current use of insulin (HCC)   Severe sepsis (HCC)   Pneumonia due to COVID-19 virus      Admitting Diagnosis:     Shortness of breath [R06 02]  HCAP (healthcare-associated pneumonia) [J18 9]    Age/Sex: 77 y o  male  Admission Orders:    albuterol, 2 puff, Inhalation, TID  apixaban, 2 5 mg, Oral, BID  aspirin, 81 mg, Oral, Daily  atorvastatin, 40 mg, Oral, Daily With Dinner  cholecalciferol, 2,000 Units, Oral, Daily  ferrous sulfate, 325 mg, Oral, Daily With Breakfast  furosemide, 40 mg, Intravenous, BID (diuretic)  insulin lispro, 2-12 Units, Subcutaneous, HS  insulin lispro, 2-12 Units, Subcutaneous, TID AC  levofloxacin, 750 mg, Intravenous, Q24H  lisinopril, 20 mg, Oral, Daily  metoprolol succinate, 100 mg, Oral, Daily  multivitamin-minerals, 1 tablet, Oral, Daily  pantoprazole, 40 mg, Oral, Early Morning  saccharomyces boulardii, 250 mg, Oral, BID  vancomycin, 12 5 mg/kg, Intravenous, Q12H      Continuous IV Infusions:     PRN Meds:  acetaminophen, 650 mg, Oral, Q6H PRN  nitroglycerin, 0 4 mg, Sublingual, Q5 Min PRN  ondansetron, 4 mg, Intravenous, Q6H PRN        IP CONSULT TO PHARMACY  IP CONSULT TO INFECTIOUS DISEASES  IP CONSULT TO PULMONOLOGY    Network Utilization Review Department  ATTENTION: Please call with any questions or concerns to [phone] and carefully listen to the prompts so that you are directed to the right person  All voicemails are confidential   Avelina Cowden all requests for admission clinical reviews, approved or denied determinations and any other requests to dedicated fax number below belonging to the campus where the patient is receiving treatment   List of dedicated fax numbers for the Facilities:  [address] DENIALS (Administrative/Medical Necessity) [phone]   [address] (Maternity/NICU/Pediatrics) 261 [facility],[address] Aureliano Barakat [phone] [address] 1924 [facility] Rere Carrizales [phone] [place] [address] [phone]

## 2021-05-05 NOTE — ASSESSMENT & PLAN NOTE
· Patient with recent diagnosis of COVID and was on 2 L nasal cannula as outpatient, patient reports that he otherwise was doing well  · Concern for possible cardiac etiology - echocardiogram completed, EF 40% appears similar to prior  · Patient got acutely short of breath prior to admission  · Currently on 7 L mid flow  · Will trial IV diuresis given elevated BNP, good urine output  · Also recently had PET scan completed as output, will attempt to obtain records  · Continue with the supplemental oxygen and wean off as tolerated

## 2021-05-05 NOTE — ASSESSMENT & PLAN NOTE
· Patient was diagnosed with COVID on 04/06  · Patient was admitted twice after diagnosis from 4/ 6-4/10 and also from 4/15-4/20   · Patient finished steroids/supportive care  · Came to the hospital with worsening of shortness of breath and chest x-ray was suggestive of worsening of infiltrate  · Given fever possibility of secondary bacterial pneumonia however initial procalcitonin was normal, patient will be started on levofloxacin and vancomycin for healthcare associated pneumonia  · ID consulted on admission  · Currently on 7 L midflow, pulmonology following - appreciate ongoing recommendations  · Possible cardiac etiology for patient's shortness of breath/hypoxia, doubt active COVID infection

## 2021-05-05 NOTE — PROGRESS NOTES
Dea Martínez  77 y o   male  1945  mrn [id number]    Assessment/Plan:  1  Sepsis/Pneumonia/Fever/Leukocytosis/Hypoxia: No further fever but WBC increased to 15K - most likely due to dose of Solumedrol that was given in the ER  He remains hypoxic  He required 10L O2 by [address] last evening but is currently on 6L by [address]  BNP was markedly elevated so there is probably a component of vol overload that is contributing to his current pulmonary sx's  Diuresis was started today  With presence of fever and leukocytosis, also need to be concerned with presence of pneumonia  Procalcitonin level was not markedly elevated but lactic acid was increased suggestive of sepsis  Urine Legionella Ag and Strep pneumo Ag were neg  Bld cx's are neg so far  Doubt his current sx's are due to ongoing COVID19 infxn  Inflammatory markers are still elevatedn from his recent [address] infxn which was tx'd with Remdesivir and decadron during his previous admission from 4/6 to 4/10/21  He was re-admitted from 4/15 to 4/20 with increasing SOB and was tx'd with steroids  Pt presented with acute onset of increasing SOB and cough x 1 day       A  Cont current abx for tx of possible pneumonia  B  No indication for tx with Remdesivir  C  Cont diuresis  D  Awaiting final cx results  E  Cont supplemental O2 as needed  F  Awaiting results of COVD19 Ab to check if he has developed immunity to COVID19 virus which would make ongoing COVID19 infxn unlikely  G  Will check CXR in AM to see if there is any improvement following diuresis  H   Cont enhanced respiratory isolation for now       Subjective: Still has some KOCH    Objective:  Tmax: 98 2  HEENT: +MFNC  Lungs: +Few crackles at the bases  Abd: +BS, soft, nontender  Ext: No calf tenderness    Labs:  CBC w/diff  Recent Labs     05/04/21  0541   WBC 15 12*   HGB 8 6*   HCT 28 5*      NEUTOPHILPCT 85*   LYMPHOPCT 5*   MONOPCT 6   EOSPCT 3     BMP  Recent Labs     05/04/21  0541   K 3 5   CL 99*   CO2 28   BUN 20   CREATININE 1 03   CALCIUM 8 3     CMP  Recent Labs     05/03/21  0536 05/04/21  0541   K 3 6 3 5    99*   CO2 27 28   BUN 17 20   CREATININE 0 99 1 03   CALCIUM 8 2* 8 3   ALKPHOS 118*  --    ALT 23  --    AST 14  --         labrc    Cultures:  Lab Results   Component Value Date    BLOODCX No Growth at 24 hrs  05/02/2021    BLOODCX No Growth at 48 hrs  05/02/2021    BLOODCX No Growth After 5 Days  04/15/2021    BLOODCX No Growth After 5 Days  04/15/2021    BLOODCX No Growth After 5 Days  04/06/2021    BLOODCX No Growth After 5 Days  04/06/2021    BLOODCX No Growth After 5 Days  04/01/2021    BLOODCX No Growth After 5 Days  04/01/2021    BLOODCX No Growth After 5 Days  02/27/2019    BLOODCX No Growth After 5 Days  02/27/2019    BLOODCX No Growth After 5 Days  11/25/2018    BLOODCX No Growth After 5 Days   11/25/2018     No results found for: WOUNDCULT  No results found for: URINECX  No results found for: SPUTUMCULTUR    MED:  Vanco: #3  Levaquin: #3         Current Facility-Administered Medications:     acetaminophen (TYLENOL) tablet 650 mg, 650 mg, Oral, Q6H PRN, Alexandre Monet MD    albuterol (PROVENTIL HFA,VENTOLIN HFA) inhaler 2 puff, 2 puff, Inhalation, TID, Beula Crown, DO, 2 puff at 05/04/21 1925    apixaban (ELIQUIS) tablet 2 5 mg, 2 5 mg, Oral, BID, Alexandre Monet MD, 2 5 mg at 05/04/21 1730    aspirin chewable tablet 81 mg, 81 mg, Oral, Daily, Alexandre Monet MD, 81 mg at 05/04/21 0931    atorvastatin (LIPITOR) tablet 40 mg, 40 mg, Oral, Daily With Lauren Juares MD, 40 mg at 05/04/21 1550    cholecalciferol (VITAMIN D3) tablet 2,000 Units, 2,000 Units, Oral, Daily, Alexandre Monet MD, 2,000 Units at 05/04/21 0930    ferrous sulfate tablet 325 mg, 325 mg, Oral, Daily With Breakfast, Alexandre Monet MD, 325 mg at 05/04/21 0930    furosemide (LASIX) injection 40 mg, 40 mg, Intravenous, BID (diuretic), Cain Siu PA-C, 40 mg at 05/04/21 1216    insulin lispro (HumaLOG) 100 units/mL subcutaneous injection 2-12 Units, 2-12 Units, Subcutaneous, HS, Rima Hughes PA-C, 2 Units at 05/04/21 2042    insulin lispro (HumaLOG) 100 units/mL subcutaneous injection 2-12 Units, 2-12 Units, Subcutaneous, TID AC, 4 Units at 05/04/21 1549 **AND** Fingerstick Glucose (POCT), , , TID AC, Rima Hughes PA-C    levofloxacin (LEVAQUIN) IVPB (premix in dextrose) 750 mg 150 mL, 750 mg, Intravenous, Q24H, Sarahy Ahuja MD, Last Rate: 100 mL/hr at 05/04/21 1550, 750 mg at 05/04/21 1550    lisinopril (ZESTRIL) tablet 20 mg, 20 mg, Oral, Daily, Sarahy Ahuja MD, 20 mg at 05/04/21 0931    metoprolol succinate (TOPROL-XL) 24 hr tablet 100 mg, 100 mg, Oral, Daily, Sarahy Ahuja MD, 100 mg at 05/04/21 0930    multivitamin-minerals (CENTRUM ADULTS) tablet 1 tablet, 1 tablet, Oral, Daily, Sarahy Ahuja MD, 1 tablet at 05/04/21 0931    nitroglycerin (NITROSTAT) SL tablet 0 4 mg, 0 4 mg, Sublingual, Q5 Min PRN, Sarahy Ahuja MD    ond[facility]ron [facility]) injection 4 mg, 4 mg, Intravenous, Q6H PRN, Sarahy Ahuja MD    pantoprazole (PROTONIX) EC tablet 40 mg, 40 mg, Oral, Early Morning, Sarahy Ahuja MD, 40 mg at 05/04/21 0536    saccharomyces boulardii (FLORASTOR) capsule 250 mg, 250 mg, Oral, BID, Sarahy Ahuja MD, 250 mg at 05/04/21 1730    vancomycin (VANCOCIN) IVPB (premix in dextrose) 1,000 mg 200 mL, 12 5 mg/kg, Intravenous, Q12H, Sarahy Ahuja MD, Last Rate: 200 mL/hr at 05/04/21 1730, 1,000 mg at 05/04/21 1730    Principal Problem:    Pneumonia due to COVID-19 virus  Active Problems:    Coronary disease    Type 2 diabetes mellitus without complication, without long-term current use of insulin ([facility] [place] [address] )    Dyslipidemia    Ac[facility] failure [facility]    Chronic diastolic congestive heart failure ([facility] )    Paroxysmal atrial fibrillation ([facility] )    Severe sepsis ([facility] )      Yousuf Taylor MD

## 2021-05-05 NOTE — PLAN OF CARE
Problem: RESPIRATORY - ADULT  Goal: Achieves optimal ventilation and oxygenation  Description: INTERVENTIONS:  - Assess for changes in respiratory status  - Assess for changes in mentation and behavior  - Position to facilitate oxygenation and minimize respiratory effort  - Oxygen administered by appropriate delivery if ordered  - Initiate smoking cessation education as indicated  - Encourage broncho-pulmonary hygiene including cough, deep breathe, Incentive Spirometry  - Assess the need for suctioning and aspirate as needed  - Assess and instruct to report SOB or any respiratory difficulty  - Respiratory Therapy support as indicated  Outcome: Progressing     Problem: DISCHARGE PLANNING  Goal: Discharge to home or other facility with appropriate resources  Description: INTERVENTIONS:  - Identify barriers to discharge w/patient and caregiver  - Arrange for needed discharge resources and transportation as appropriate  - Identify discharge learning needs (meds, wound care, etc )  - Arrange for interpretive services to assist at discharge as needed  - Refer to Case Management Department for coordinating discharge planning if the patient needs post-hospital services based on physician/advanced practitioner order or complex needs related to functional status, cognitive ability, or social support system  Outcome: Progressing     Problem: Knowledge Deficit  Goal: Patient/family/caregiver demonstrates understanding of disease process, treatment plan, medications, and discharge instructions  Description: Complete learning assessment and assess knowledge base    Interventions:  - Provide teaching at level of understanding  - Provide teaching via preferred learning methods  Outcome: Progressing     Problem: PAIN - ADULT  Goal: Verbalizes/displays adequate comfort level or baseline comfort level  Description: Interventions:  - Encourage patient to monitor pain and request assistance  - Assess pain using appropriate pain scale  - Administer analgesics based on type and severity of pain and evaluate response  - Implement non-pharmacological measures as appropriate and evaluate response  - Consider cultural and social influences on pain and pain management  - Notify physician/advanced practitioner if interventions unsuccessful or patient reports new pain  Outcome: Progressing     Problem: INFECTION - ADULT  Goal: Absence or prevention of progression during hospitalization  Description: INTERVENTIONS:  - Assess and monitor for signs and symptoms of infection  - Monitor lab/diagnostic results  - Monitor all insertion sites, i e  indwelling lines, tubes, and drains  - Monitor endotracheal if appropriate and nasal secretions for changes in amount and color  - [place] appropriate cooling/warming therapies per order  - Administer medications as ordered  - Instruct and encourage patient and family to use good hand hygiene technique  - Identify and instruct in appropriate isolation precautions for identified infection/condition  Outcome: Progressing  Goal: Absence of fever/infection during neutropenic period  Description: INTERVENTIONS:  - Monitor WBC    Outcome: Progressing     Problem: SAFETY ADULT  Goal: Patient will remain free of falls  Description: INTERVENTIONS:  - Assess patient frequently for physical needs  -  Identify cognitive and physical deficits and behaviors that affect risk of falls    -  [place] fall precautions as indicated by assessment   - Educate patient/family on patient safety including physical limitations  - Instruct patient to call for assistance with activity based on assessment  - Modify environment to reduce risk of injury  - Consider OT/PT consult to assist with strengthening/mobility  Outcome: Progressing  Goal: Maintain or return to baseline ADL function  Description: INTERVENTIONS:  -  Assess patient's ability to carry out ADLs; assess patient's baseline for ADL function and identify physical deficits which impact ability to perform ADLs (bathing, care of mouth/teeth, toileting, grooming, dressing, etc )  - Assess/evaluate cause of self-care deficits   - Assess range of motion  - Assess patient's mobility; develop plan if impaired  - Assess patient's need for assistive devices and provide as appropriate  - Encourage maximum independence but intervene and supervise when necessary  - Involve family in performance of ADLs  - Assess for home care needs following discharge   - Consider OT consult to assist with ADL evaluation and planning for discharge  - Provide patient education as appropriate  Outcome: Progressing  Goal: Maintain or return mobility status to optimal level  Description: INTERVENTIONS:  - Assess patient's baseline mobility status (ambulation, transfers, stairs, etc )    - Identify cognitive and physical deficits and behaviors that affect mobility  - Identify mobility aids required to assist with transfers and/or ambulation (gait belt, sit-to-stand, lift, walker, cane, etc )  - [place] fall precautions as indicated by assessment  - Record patient progress and toleration of activity level on Mobility SBAR; progress patient to next Phase/Stage  - Instruct patient to call for assistance with activity based on assessment  - Consider rehabilitation consult to assist with strengthening/weightbearing, etc   Outcome: Progressing     Problem: Potential for Falls  Goal: Patient will remain free of falls  Description: INTERVENTIONS:  - Assess patient frequently for physical needs  -  Identify cognitive and physical deficits and behaviors that affect risk of falls    -  [place] fall precautions as indicated by assessment   - Educate patient/family on patient safety including physical limitations  - Instruct patient to call for assistance with activity based on assessment  - Modify environment to reduce risk of injury  - Consider OT/PT consult to assist with strengthening/mobility  Outcome: Progressing

## 2021-05-05 NOTE — ASSESSMENT & PLAN NOTE
Lab Results   Component Value Date    HGBA1C 5 8 (H) 12/30/2020       Recent Labs     05/04/21  1548 05/04/21  2040 05/05/21  0806 05/05/21  1118   POCGLU 211* 185* 137 198*       Blood Sugar Average: Last 72 hrs:  (P) 222   · Hold oral agents while inpatient  · SSI + accuchek  · Diabetic diet

## 2021-05-06 ENCOUNTER — APPOINTMENT (INPATIENT)
Dept: CT IMAGING | Facility: HOSPITAL | Age: 76
DRG: 871 | End: 2021-05-06
Payer: COMMERCIAL

## 2021-05-06 LAB
ANION GAP SERPL CALCULATED.3IONS-SCNC: 10 MMOL/L (ref 4–13)
ANION GAP SERPL CALCULATED.3IONS-SCNC: 7 MMOL/L (ref 4–13)
BASOPHILS # BLD AUTO: 0.04 THOUSANDS/ΜL (ref 0–0.1)
BASOPHILS NFR BLD AUTO: 0 % (ref 0–1)
BUN SERPL-MCNC: 13 MG/DL (ref 5–25)
BUN SERPL-MCNC: 15 MG/DL (ref 5–25)
CALCIUM SERPL-MCNC: 8 MG/DL (ref 8.3–10.1)
CALCIUM SERPL-MCNC: 8.2 MG/DL (ref 8.3–10.1)
CHLORIDE SERPL-SCNC: 97 MMOL/L (ref 100–108)
CHLORIDE SERPL-SCNC: 98 MMOL/L (ref 100–108)
CO2 SERPL-SCNC: 30 MMOL/L (ref 21–32)
CO2 SERPL-SCNC: 35 MMOL/L (ref 21–32)
CREAT SERPL-MCNC: 0.97 MG/DL (ref 0.6–1.3)
CREAT SERPL-MCNC: 1.08 MG/DL (ref 0.6–1.3)
D DIMER PPP FEU-MCNC: 3.02 UG/ML FEU
EOSINOPHIL # BLD AUTO: 0.7 THOUSAND/ΜL (ref 0–0.61)
EOSINOPHIL NFR BLD AUTO: 7 % (ref 0–6)
ERYTHROCYTE [DISTWIDTH] IN BLOOD BY AUTOMATED COUNT: 18.7 % (ref 11.6–15.1)
GFR SERPL CREATININE-BSD FRML MDRD: 67 ML/MIN/1.73SQ M
GFR SERPL CREATININE-BSD FRML MDRD: 77 ML/MIN/1.73SQ M
GLUCOSE SERPL-MCNC: 123 MG/DL (ref 65–140)
GLUCOSE SERPL-MCNC: 140 MG/DL (ref 65–140)
GLUCOSE SERPL-MCNC: 163 MG/DL (ref 65–140)
GLUCOSE SERPL-MCNC: 246 MG/DL (ref 65–140)
GLUCOSE SERPL-MCNC: 270 MG/DL (ref 65–140)
HCT VFR BLD AUTO: 23.9 % (ref 36.5–49.3)
HCT VFR BLD AUTO: 24 % (ref 36.5–49.3)
HGB BLD-MCNC: 7.4 G/DL (ref 12–17)
HGB BLD-MCNC: 7.6 G/DL (ref 12–17)
IMM GRANULOCYTES # BLD AUTO: 0.06 THOUSAND/UL (ref 0–0.2)
IMM GRANULOCYTES NFR BLD AUTO: 1 % (ref 0–2)
LYMPHOCYTES # BLD AUTO: 0.82 THOUSANDS/ΜL (ref 0.6–4.47)
LYMPHOCYTES NFR BLD AUTO: 8 % (ref 14–44)
MCH RBC QN AUTO: 24.4 PG (ref 26.8–34.3)
MCHC RBC AUTO-ENTMCNC: 31 G/DL (ref 31.4–37.4)
MCV RBC AUTO: 79 FL (ref 82–98)
MONOCYTES # BLD AUTO: 0.75 THOUSAND/ΜL (ref 0.17–1.22)
MONOCYTES NFR BLD AUTO: 8 % (ref 4–12)
NEUTROPHILS # BLD AUTO: 7.64 THOUSANDS/ΜL (ref 1.85–7.62)
NEUTS SEG NFR BLD AUTO: 76 % (ref 43–75)
NRBC BLD AUTO-RTO: 0 /100 WBCS
NT-PROBNP SERPL-MCNC: 6501 PG/ML
PLATELET # BLD AUTO: 213 THOUSANDS/UL (ref 149–390)
PMV BLD AUTO: 11.2 FL (ref 8.9–12.7)
POTASSIUM SERPL-SCNC: 3.1 MMOL/L (ref 3.5–5.3)
POTASSIUM SERPL-SCNC: 3.1 MMOL/L (ref 3.5–5.3)
RBC # BLD AUTO: 3.03 MILLION/UL (ref 3.88–5.62)
SARS-COV-2 IGG SERPL QL IA: NORMAL
SARS-COV-2 IGG+IGM SERPL QL IA: REACTIVE
SODIUM SERPL-SCNC: 138 MMOL/L (ref 136–145)
SODIUM SERPL-SCNC: 139 MMOL/L (ref 136–145)
WBC # BLD AUTO: 9.66 THOUSAND/UL (ref 4.31–10.16)

## 2021-05-06 PROCEDURE — 85027 COMPLETE CBC AUTOMATED: CPT | Performed by: PHYSICIAN ASSISTANT

## 2021-05-06 PROCEDURE — 99232 SBSQ HOSP IP/OBS MODERATE 35: CPT | Performed by: INTERNAL MEDICINE

## 2021-05-06 PROCEDURE — 82948 REAGENT STRIP/BLOOD GLUCOSE: CPT

## 2021-05-06 PROCEDURE — 85014 HEMATOCRIT: CPT | Performed by: PHYSICIAN ASSISTANT

## 2021-05-06 PROCEDURE — 71275 CT ANGIOGRAPHY CHEST: CPT

## 2021-05-06 PROCEDURE — 85018 HEMOGLOBIN: CPT | Performed by: PHYSICIAN ASSISTANT

## 2021-05-06 PROCEDURE — 85025 COMPLETE CBC W/AUTO DIFF WBC: CPT | Performed by: PHYSICIAN ASSISTANT

## 2021-05-06 PROCEDURE — 85379 FIBRIN DEGRADATION QUANT: CPT | Performed by: PHYSICIAN ASSISTANT

## 2021-05-06 PROCEDURE — 6A550Z3 PHERESIS OF PLASMA, SINGLE: ICD-10-PCS | Performed by: INTERNAL MEDICINE

## 2021-05-06 PROCEDURE — G1004 CDSM NDSC: HCPCS

## 2021-05-06 PROCEDURE — 86769 SARS-COV-2 COVID-19 ANTIBODY: CPT | Performed by: PHYSICIAN ASSISTANT

## 2021-05-06 PROCEDURE — 99232 SBSQ HOSP IP/OBS MODERATE 35: CPT | Performed by: PHYSICIAN ASSISTANT

## 2021-05-06 PROCEDURE — 80048 BASIC METABOLIC PNL TOTAL CA: CPT | Performed by: PHYSICIAN ASSISTANT

## 2021-05-06 PROCEDURE — 83880 ASSAY OF NATRIURETIC PEPTIDE: CPT | Performed by: PHYSICIAN ASSISTANT

## 2021-05-06 PROCEDURE — 94760 N-INVAS EAR/PLS OXIMETRY 1: CPT

## 2021-05-06 RX ORDER — POTASSIUM CHLORIDE 14.9 MG/ML
20 INJECTION INTRAVENOUS ONCE
Status: DISCONTINUED | OUTPATIENT
Start: 2021-05-06 | End: 2021-05-06

## 2021-05-06 RX ORDER — POTASSIUM CHLORIDE 20 MEQ/1
40 TABLET, EXTENDED RELEASE ORAL EVERY 4 HOURS
Status: DISCONTINUED | OUTPATIENT
Start: 2021-05-06 | End: 2021-05-06

## 2021-05-06 RX ADMIN — IOHEXOL 85 ML: 350 INJECTION, SOLUTION INTRAVENOUS at 14:10

## 2021-05-06 RX ADMIN — ALBUTEROL SULFATE 2 PUFF: 90 AEROSOL, METERED RESPIRATORY (INHALATION) at 08:09

## 2021-05-06 RX ADMIN — APIXABAN 2.5 MG: 2.5 TABLET, FILM COATED ORAL at 08:09

## 2021-05-06 RX ADMIN — METOPROLOL SUCCINATE 100 MG: 50 TABLET, EXTENDED RELEASE ORAL at 08:09

## 2021-05-06 RX ADMIN — FUROSEMIDE 40 MG: 10 INJECTION, SOLUTION INTRAVENOUS at 08:09

## 2021-05-06 RX ADMIN — Medication 27.5 G: at 17:46

## 2021-05-06 RX ADMIN — INSULIN LISPRO 4 UNITS: 100 INJECTION, SOLUTION INTRAVENOUS; SUBCUTANEOUS at 11:06

## 2021-05-06 RX ADMIN — Medication 250 MG: at 08:09

## 2021-05-06 RX ADMIN — ALBUTEROL SULFATE 2 PUFF: 90 AEROSOL, METERED RESPIRATORY (INHALATION) at 21:54

## 2021-05-06 RX ADMIN — ATORVASTATIN CALCIUM 40 MG: 40 TABLET, FILM COATED ORAL at 17:46

## 2021-05-06 RX ADMIN — LISINOPRIL 20 MG: 20 TABLET ORAL at 08:09

## 2021-05-06 RX ADMIN — FERROUS SULFATE TAB 325 MG (65 MG ELEMENTAL FE) 325 MG: 325 (65 FE) TAB at 08:09

## 2021-05-06 RX ADMIN — Medication 250 MG: at 17:46

## 2021-05-06 RX ADMIN — INSULIN LISPRO 6 UNITS: 100 INJECTION, SOLUTION INTRAVENOUS; SUBCUTANEOUS at 21:54

## 2021-05-06 RX ADMIN — LEVOFLOXACIN 750 MG: 5 INJECTION, SOLUTION INTRAVENOUS at 17:46

## 2021-05-06 RX ADMIN — APIXABAN 2.5 MG: 2.5 TABLET, FILM COATED ORAL at 17:46

## 2021-05-06 RX ADMIN — PANTOPRAZOLE SODIUM 40 MG: 40 TABLET, DELAYED RELEASE ORAL at 06:19

## 2021-05-06 RX ADMIN — FUROSEMIDE 40 MG: 10 INJECTION, SOLUTION INTRAVENOUS at 17:44

## 2021-05-06 RX ADMIN — Medication 2000 UNITS: at 08:09

## 2021-05-06 RX ADMIN — INSULIN LISPRO 2 UNITS: 100 INJECTION, SOLUTION INTRAVENOUS; SUBCUTANEOUS at 08:07

## 2021-05-06 RX ADMIN — ASPIRIN 81 MG CHEWABLE TABLET 81 MG: 81 TABLET CHEWABLE at 08:09

## 2021-05-06 RX ADMIN — MULTIPLE VITAMINS W/ MINERALS TAB 1 TABLET: TAB ORAL at 08:09

## 2021-05-06 RX ADMIN — ALBUTEROL SULFATE 2 PUFF: 90 AEROSOL, METERED RESPIRATORY (INHALATION) at 17:47

## 2021-05-06 NOTE — PROGRESS NOTES
Progress Note - Pulmonary   Dung Ortiz 77 y o  male MRN: [id number]  Unit/Bed#: -01 Encounter: [id number]    Assessment:  Acute hypoxic respiratory failure  Abnormal chest CT with diffuse ground-glass opacities, septal thickening, and bilateral pleural effusions  Diastolic congestive heart failure  Recent COVID-19 infection  History of lymphoma  Former smoker    Plan:  Patient was recently discharged from the hospital following COVID-19 infection on 3 L of oxygen at this time requiring 15 L mid flow  Continue to monitor and maintain saturations greater than 89%  Encourage pulmonary toileting including incentive spirometry, out of bed to chair as tolerated  Empirically treating with antibiotics for pneumonia although procalcitonin has been normal   Patient on Levaquin  ID following  Continue diuresis per primary team   BNP is significantly elevated  Despite patient diuresing appropriately, he remains persistently hypoxic  Will check D-dimer if elevated, will likely need CTA to evaluate for pulmonary embolism  Discussed with patient risk of IV contrast affecting his kidney function while being diuresed, he understands and is agreeable if it is felt to be clinically indicated  Patient has PET-CT results on his phone, showed hypermetabolic multifocal nodular opacities consistent with infection vs metastatic disease  He will likely need repeat imaging with his Oncology team following this hospitalization       Discussed with nursing    Subjective:   Patient feels that his shortness of breath is worse today despite frequently urinating with the IV Lasix  He does not have any chest pain  Denies lower extremity edema or pain  12 point review of systems otherwise negative  Objective:     Vitals: Blood pressure 113/54, pulse 95, temperature 98 2 °F (36 8 °C), resp  rate 18, height 5' 11" (1 803 m), weight 70 5 kg (155 lb 6 8 oz), SpO2 91 %  ,Body mass index is 21 68 kg/m²        Intake/Output Summary (Last 24 hours) at 5/6/2021 1053  Last data filed at 5/6/2021 0819  Gross per 24 hour   Intake 1840 ml   Output 3415 ml   Net -1575 ml       Invasive Devices     Peripheral Intravenous Line            Peripheral IV 05/06/21 Dorsal (posterior); Left Wrist less than 1 day                Physical Exam:  General appearance: alert and oriented, in no acute distress  Head: Normocephalic, without obvious abnormality, atraumatic  Eyes: EOMI  No discharge bilaterally  No scleral icterus  Neck: supple, symmetrical, trachea midline  Lungs: Decreased breath sounds  Heart: regular rate   Abdomen:  No appreciable distension or tenderness  Extremities: No edema or tenderness  Skin: No lesions or pallor noted  No rash  Neurologic: Grossly normal     Labs: I have personally reviewed pertinent lab results  Imaging and other studies: I have personally reviewed pertinent reports     and I have personally reviewed pertinent films in PACS

## 2021-05-06 NOTE — ASSESSMENT & PLAN NOTE
Lab Results   Component Value Date    HGBA1C 5 8 (H) 12/30/2020       Recent Labs     05/05/21  1618 05/05/21  2150 05/06/21  0806 05/06/21  1102   POCGLU 174* 228* 163* 246*       Blood Sugar Average: Last 72 hrs:  (P) 222 [id number]   · Hold oral agents while inpatient  · SSI + accuchek  · Diabetic diet

## 2021-05-06 NOTE — ASSESSMENT & PLAN NOTE
Wt Readings from Last 3 Encounters:   05/06/21 70 5 kg (155 lb 6 8 oz)   04/20/21 73 2 kg (161 lb 6 oz)   04/10/21 76 8 kg (169 lb 5 oz)     · Diuretics initially held on admission - home regimen includes Lasix 20 mg twice daily patient reports compliance - overall on exam appears euvolemic  · Elevated BNP on admission around 7 K, repeat showed 11 K  · Concern for possible cardiac etiology for shortness of breath/worsening hypoxia  · Obtain echocardiogram - EF 40% appears similar to prior  · Good urine output with lasix 40 mg BID  · Diurese as tolerated, monitor creatinine, if creatinine peaks, will resume home dose diuretics    · CTA does suggest pulmonary effusion and congestion

## 2021-05-06 NOTE — NURSING NOTE
O2 now increased to 15L midflow by RT  Saturations now at 94%  Will continue to monitor     Marisol Dodge RN

## 2021-05-06 NOTE — ASSESSMENT & PLAN NOTE
· Patient with recent diagnosis of COVID and was on 2 L nasal cannula as outpatient, patient reports that he otherwise was doing well  · Concern for possible cardiac etiology - echocardiogram completed, EF 40% appears similar to prior  · Patient got acutely short of breath prior to admission  · Currently on 15L mid flow  · Will trial IV diuresis given elevated BNP, good urine output- ongoing 40 milligram IV Lasix b i d  · Also recently had PET scan completed as output, working with [facility] onc- nodules and lymph nodes illuminated, question reactive inflammatory response versus tumor    Patient will need outpatient follow-up with his oncologist   · Continue with the supplemental oxygen and wean off as tolerated

## 2021-05-06 NOTE — ASSESSMENT & PLAN NOTE
· Follows with Dr Shawn Lu with [facility]  [place] [phone]  · Considering lung biopsy, patient currently not stable enough to undergo biopsy  · PET scan was done through [facility]- concerning for illuminating areas, question possible reactive inflammatory response versus tumors    Patient will need outpatient follow-up with his oncologist   · Recommending IVIG- ordered 5/6

## 2021-05-06 NOTE — ASSESSMENT & PLAN NOTE
· Patient was diagnosed with COVID on 04/06  · Patient was admitted twice after diagnosis from 4/ 6-4/10 and also from 4/15-4/20   · Patient finished steroids/supportive care  · Came to the hospital with worsening of shortness of breath and chest x-ray was suggestive of worsening of infiltrate  · Given fever possibility of secondary bacterial pneumonia however initial procalcitonin was normal, patient will be started on levofloxacin and vancomycin for healthcare associated pneumonia  · ID consulted on admission  · Currently on 15L midflow, pulmonology following - appreciate ongoing recommendations  · Possible cardiac etiology for patient's shortness of breath/hypoxia, doubt active COVID infection

## 2021-05-06 NOTE — NURSING NOTE
RN notified by Ney Moffett that patient took off O2 to blow his nose and began to desaturate in the 70s  O2 increased from 7L midflow nasal cannula to 10L  Saturations between 87-90%  RT Alen notified  RN and RT at the bedside     Daria Bella RN

## 2021-05-07 LAB
ABO GROUP BLD: NORMAL
ALBUMIN SERPL BCP-MCNC: 1.7 G/DL (ref 3.5–5)
ALP SERPL-CCNC: 99 U/L (ref 46–116)
ALT SERPL W P-5'-P-CCNC: 25 U/L (ref 12–78)
ANION GAP SERPL CALCULATED.3IONS-SCNC: 9 MMOL/L (ref 4–13)
AST SERPL W P-5'-P-CCNC: 24 U/L (ref 5–45)
BACTERIA BLD CULT: NORMAL
BASOPHILS # BLD AUTO: 0.04 THOUSANDS/ΜL (ref 0–0.1)
BASOPHILS NFR BLD AUTO: 0 % (ref 0–1)
BILIRUB SERPL-MCNC: 0.9 MG/DL (ref 0.2–1)
BLD GP AB SCN SERPL QL: NEGATIVE
BUN SERPL-MCNC: 14 MG/DL (ref 5–25)
CALCIUM ALBUM COR SERPL-MCNC: 9.5 MG/DL (ref 8.3–10.1)
CALCIUM SERPL-MCNC: 7.7 MG/DL (ref 8.3–10.1)
CHLORIDE SERPL-SCNC: 98 MMOL/L (ref 100–108)
CO2 SERPL-SCNC: 31 MMOL/L (ref 21–32)
CREAT SERPL-MCNC: 1 MG/DL (ref 0.6–1.3)
CRP SERPL QL: 160.1 MG/L
EOSINOPHIL # BLD AUTO: 0.59 THOUSAND/ΜL (ref 0–0.61)
EOSINOPHIL NFR BLD AUTO: 6 % (ref 0–6)
ERYTHROCYTE [DISTWIDTH] IN BLOOD BY AUTOMATED COUNT: 18.5 % (ref 11.6–15.1)
FERRITIN SERPL-MCNC: 348 NG/ML (ref 8–388)
GFR SERPL CREATININE-BSD FRML MDRD: 74 ML/MIN/1.73SQ M
GLUCOSE SERPL-MCNC: 117 MG/DL (ref 65–140)
GLUCOSE SERPL-MCNC: 141 MG/DL (ref 65–140)
GLUCOSE SERPL-MCNC: 214 MG/DL (ref 65–140)
GLUCOSE SERPL-MCNC: 253 MG/DL (ref 65–140)
GLUCOSE SERPL-MCNC: 333 MG/DL (ref 65–140)
HCT VFR BLD AUTO: 24.6 % (ref 36.5–49.3)
HGB BLD-MCNC: 7.4 G/DL (ref 12–17)
IMM GRANULOCYTES # BLD AUTO: 0.04 THOUSAND/UL (ref 0–0.2)
IMM GRANULOCYTES NFR BLD AUTO: 0 % (ref 0–2)
LYMPHOCYTES # BLD AUTO: 0.61 THOUSANDS/ΜL (ref 0.6–4.47)
LYMPHOCYTES NFR BLD AUTO: 6 % (ref 14–44)
MCH RBC QN AUTO: 23.8 PG (ref 26.8–34.3)
MCHC RBC AUTO-ENTMCNC: 30.1 G/DL (ref 31.4–37.4)
MCV RBC AUTO: 79 FL (ref 82–98)
MONOCYTES # BLD AUTO: 0.86 THOUSAND/ΜL (ref 0.17–1.22)
MONOCYTES NFR BLD AUTO: 9 % (ref 4–12)
NEUTROPHILS # BLD AUTO: 7.92 THOUSANDS/ΜL (ref 1.85–7.62)
NEUTS SEG NFR BLD AUTO: 79 % (ref 43–75)
PLATELET # BLD AUTO: 199 THOUSANDS/UL (ref 149–390)
PMV BLD AUTO: 10.1 FL (ref 8.9–12.7)
POTASSIUM SERPL-SCNC: 3.2 MMOL/L (ref 3.5–5.3)
PROT SERPL-MCNC: 5.6 G/DL (ref 6.4–8.2)
RBC # BLD AUTO: 3.11 MILLION/UL (ref 3.88–5.62)
RH BLD: POSITIVE
SARS-COV-2 RNA RESP QL NAA+PROBE: NEGATIVE
SODIUM SERPL-SCNC: 138 MMOL/L (ref 136–145)
SPECIMEN EXPIRATION DATE: NORMAL
WBC # BLD AUTO: 10.06 THOUSAND/UL (ref 4.31–10.16)

## 2021-05-07 PROCEDURE — 82948 REAGENT STRIP/BLOOD GLUCOSE: CPT

## 2021-05-07 PROCEDURE — 99232 SBSQ HOSP IP/OBS MODERATE 35: CPT | Performed by: INTERNAL MEDICINE

## 2021-05-07 PROCEDURE — U0005 INFEC AGEN DETEC AMPLI PROBE: HCPCS | Performed by: PHYSICIAN ASSISTANT

## 2021-05-07 PROCEDURE — XW13325 TRANSFUSION OF CONVALESCENT PLASMA (NONAUTOLOGOUS) INTO PERIPHERAL VEIN, PERCUTANEOUS APPROACH, NEW TECHNOLOGY GROUP 5: ICD-10-PCS | Performed by: INTERNAL MEDICINE

## 2021-05-07 PROCEDURE — 94760 N-INVAS EAR/PLS OXIMETRY 1: CPT

## 2021-05-07 PROCEDURE — 82728 ASSAY OF FERRITIN: CPT | Performed by: INTERNAL MEDICINE

## 2021-05-07 PROCEDURE — 86901 BLOOD TYPING SEROLOGIC RH(D): CPT | Performed by: PHYSICIAN ASSISTANT

## 2021-05-07 PROCEDURE — 80053 COMPREHEN METABOLIC PANEL: CPT | Performed by: PHYSICIAN ASSISTANT

## 2021-05-07 PROCEDURE — XW033E5 INTRODUCTION OF REMDESIVIR ANTI-INFECTIVE INTO PERIPHERAL VEIN, PERCUTANEOUS APPROACH, NEW TECHNOLOGY GROUP 5: ICD-10-PCS | Performed by: INTERNAL MEDICINE

## 2021-05-07 PROCEDURE — U0003 INFECTIOUS AGENT DETECTION BY NUCLEIC ACID (DNA OR RNA); SEVERE ACUTE RESPIRATORY SYNDROME CORONAVIRUS 2 (SARS-COV-2) (CORONAVIRUS DISEASE [COVID-19]), AMPLIFIED PROBE TECHNIQUE, MAKING USE OF HIGH THROUGHPUT TECHNOLOGIES AS DESCRIBED BY CMS-2020-01-R: HCPCS | Performed by: PHYSICIAN ASSISTANT

## 2021-05-07 PROCEDURE — 86850 RBC ANTIBODY SCREEN: CPT | Performed by: PHYSICIAN ASSISTANT

## 2021-05-07 PROCEDURE — 86140 C-REACTIVE PROTEIN: CPT | Performed by: INTERNAL MEDICINE

## 2021-05-07 PROCEDURE — 86900 BLOOD TYPING SEROLOGIC ABO: CPT | Performed by: PHYSICIAN ASSISTANT

## 2021-05-07 PROCEDURE — 85025 COMPLETE CBC W/AUTO DIFF WBC: CPT | Performed by: PHYSICIAN ASSISTANT

## 2021-05-07 RX ORDER — METHYLPREDNISOLONE SODIUM SUCCINATE 40 MG/ML
40 INJECTION, POWDER, LYOPHILIZED, FOR SOLUTION INTRAMUSCULAR; INTRAVENOUS EVERY 12 HOURS SCHEDULED
Status: DISCONTINUED | OUTPATIENT
Start: 2021-05-07 | End: 2021-05-11

## 2021-05-07 RX ORDER — POTASSIUM CHLORIDE 20 MEQ/1
40 TABLET, EXTENDED RELEASE ORAL ONCE
Status: COMPLETED | OUTPATIENT
Start: 2021-05-07 | End: 2021-05-07

## 2021-05-07 RX ORDER — POTASSIUM CHLORIDE 14.9 MG/ML
20 INJECTION INTRAVENOUS ONCE
Status: COMPLETED | OUTPATIENT
Start: 2021-05-07 | End: 2021-05-07

## 2021-05-07 RX ADMIN — Medication 2000 UNITS: at 08:47

## 2021-05-07 RX ADMIN — INSULIN LISPRO 6 UNITS: 100 INJECTION, SOLUTION INTRAVENOUS; SUBCUTANEOUS at 12:41

## 2021-05-07 RX ADMIN — POTASSIUM CHLORIDE 40 MEQ: 1500 TABLET, EXTENDED RELEASE ORAL at 13:49

## 2021-05-07 RX ADMIN — Medication 250 MG: at 17:11

## 2021-05-07 RX ADMIN — APIXABAN 2.5 MG: 2.5 TABLET, FILM COATED ORAL at 08:46

## 2021-05-07 RX ADMIN — INSULIN LISPRO 8 UNITS: 100 INJECTION, SOLUTION INTRAVENOUS; SUBCUTANEOUS at 21:23

## 2021-05-07 RX ADMIN — ALBUTEROL SULFATE 2 PUFF: 90 AEROSOL, METERED RESPIRATORY (INHALATION) at 19:58

## 2021-05-07 RX ADMIN — LISINOPRIL 20 MG: 20 TABLET ORAL at 08:46

## 2021-05-07 RX ADMIN — FUROSEMIDE 40 MG: 10 INJECTION, SOLUTION INTRAVENOUS at 17:15

## 2021-05-07 RX ADMIN — LEVOFLOXACIN 750 MG: 5 INJECTION, SOLUTION INTRAVENOUS at 17:11

## 2021-05-07 RX ADMIN — INSULIN LISPRO 4 UNITS: 100 INJECTION, SOLUTION INTRAVENOUS; SUBCUTANEOUS at 17:15

## 2021-05-07 RX ADMIN — ASPIRIN 81 MG CHEWABLE TABLET 81 MG: 81 TABLET CHEWABLE at 08:47

## 2021-05-07 RX ADMIN — METHYLPREDNISOLONE SODIUM SUCCINATE 40 MG: 40 INJECTION, POWDER, FOR SOLUTION INTRAMUSCULAR; INTRAVENOUS at 20:01

## 2021-05-07 RX ADMIN — FUROSEMIDE 40 MG: 10 INJECTION, SOLUTION INTRAVENOUS at 08:46

## 2021-05-07 RX ADMIN — POTASSIUM CHLORIDE 20 MEQ: 14.9 INJECTION, SOLUTION INTRAVENOUS at 13:49

## 2021-05-07 RX ADMIN — ALBUTEROL SULFATE 2 PUFF: 90 AEROSOL, METERED RESPIRATORY (INHALATION) at 13:49

## 2021-05-07 RX ADMIN — PANTOPRAZOLE SODIUM 40 MG: 40 TABLET, DELAYED RELEASE ORAL at 05:52

## 2021-05-07 RX ADMIN — MULTIPLE VITAMINS W/ MINERALS TAB 1 TABLET: TAB ORAL at 08:46

## 2021-05-07 RX ADMIN — ALBUTEROL SULFATE 2 PUFF: 90 AEROSOL, METERED RESPIRATORY (INHALATION) at 08:46

## 2021-05-07 RX ADMIN — REMDESIVIR 200 MG: 100 INJECTION, POWDER, LYOPHILIZED, FOR SOLUTION INTRAVENOUS at 17:11

## 2021-05-07 RX ADMIN — Medication 250 MG: at 08:46

## 2021-05-07 RX ADMIN — FERROUS SULFATE TAB 325 MG (65 MG ELEMENTAL FE) 325 MG: 325 (65 FE) TAB at 08:46

## 2021-05-07 RX ADMIN — METHYLPREDNISOLONE SODIUM SUCCINATE 40 MG: 40 INJECTION, POWDER, FOR SOLUTION INTRAMUSCULAR; INTRAVENOUS at 12:41

## 2021-05-07 RX ADMIN — METOPROLOL SUCCINATE 100 MG: 50 TABLET, EXTENDED RELEASE ORAL at 08:46

## 2021-05-07 RX ADMIN — ATORVASTATIN CALCIUM 40 MG: 40 TABLET, FILM COATED ORAL at 17:11

## 2021-05-07 RX ADMIN — APIXABAN 2.5 MG: 2.5 TABLET, FILM COATED ORAL at 17:11

## 2021-05-07 NOTE — ASSESSMENT & PLAN NOTE
Lab Results   Component Value Date    HGBA1C 5 8 (H) 12/30/2020       Recent Labs     05/06/21  1102 05/06/21  2141 05/07/21  0842 05/07/21  1117   POCGLU 246* 270* 141* 253*       Blood Sugar Average: Last 72 hrs:  (P) 196 [id number]   · Hold oral agents while inpatient  · SSI + accuchek  · Diabetic diet

## 2021-05-07 NOTE — ASSESSMENT & PLAN NOTE
· Patient with recent diagnosis of COVID and was on 2 L nasal cannula as outpatient, patient reports that he otherwise was doing well  · Concern for possible cardiac etiology - echocardiogram completed, EF 40% appears similar to prior  · Patient got acutely short of breath prior to admission  · Currently on 15L mid flow  · Will trial IV diuresis given elevated BNP, good urine output- ongoing 40 milligram IV Lasix b i d  · Also recently had PET scan completed as output, working with [facility] onc- nodules and lymph nodes illuminated, question reactive inflammatory response versus tumor    Patient will need outpatient follow-up with his oncologist   · Continue with the supplemental oxygen and wean off as tolerated

## 2021-05-07 NOTE — PROGRESS NOTES
Progress Note - Pulmonary   Ericka Ortiz 77 y o  male MRN: [id number]  Unit/Bed#: -01 Encounter: [id number]      Assessment/Plan:    · Acute hypoxic respiratory failure - remains on mid flow nasal cannula and intermittently needing non-rebreather mask to maintain saturations above 90%  Chest CT done yesterday shows progressive consolidation  · History of COVID-19 pneumonia, treated with remdesivir and steroids in April - given persistent ground-glass opacities and inflammatory changes on CT, will resume IV steroids    · Abnormal chest CT with dense bilateral consolidations - likely related to COVID and possible superimposed bacterial process  Completing 7 days of Levaquin  No evidence of fibrotic changes  No evidence of pulmonary embolism  Patient is too unstable for bronchoscopy  · Chronic diastolic congestive heart failure with small bilateral pleural effusions - maintain euvolemia    · Bilateral pulmonary nodules, recent PET scan / history of lymphoma - outpatient follow-up with his primary oncologist    Subjective:   Patient feels about the same  Persistent shortness of breath, worse with any exertion  He has cough with scant sputum production  Sputum cultures were never sent  Objective:     Vitals: Blood pressure 111/55, pulse 97, temperature 99 6 °F (37 6 °C), resp  rate 18, height 5' 11" (1 803 m), weight 68 1 kg (150 lb 3 2 oz), SpO2 91 %  , 15 L, Body mass index is 20 95 kg/m²  Intake/Output Summary (Last 24 hours) at 5/7/2021 0936  Last data filed at 5/7/2021 0601  Gross per 24 hour   Intake 720 ml   Output 3350 ml   Net -2630 ml     Physical Exam:      General:  Awake, alert, no distress   HEENT: No scleral icterus, EOMI, moist mucosa    Heart:  Regular rate and rhythm, no murmur   Lungs: Decreased breath sounds   Abdomen: Soft, nontender, normal bowel sounds   Extremities: No clubbing, cyanosis or edema    Labs: I have personally reviewed pertinent lab results      Results from last 7 days   Lab Units 05/07/21  0557 05/06/21  0543 05/05/21  0515   WBC Thousand/uL 10 06 9 66 9 43   HEMOGLOBIN g/dL 7 4* 7 4*  7 6* 7 3*   HEMATOCRIT % 24 6* 23 9*  24 0* 24 2*   PLATELETS Thousands/uL 199 213 196         Results from last 7 days   Lab Units 05/07/21  0557 05/06/21  1347 05/06/21  0649 05/05/21  0515  05/03/21  0536   POTASSIUM mmol/L 3 2* 3 1* 3 1* 3 2*   < > 3 6   CHLORIDE mmol/L 98* 97* 98* 99*   < > 100   CO2 mmol/L 31 35* 30 28   < > 27   BUN mg/dL 14 15 13 16   < > 17   CREATININE mg/dL 1 00 1 08 0 97 0 95   < > 0 99   CALCIUM mg/dL 7 7* 8 2* 8 0* 8 0*   < > 8 2*   ALK PHOS U/L 99  --   --  94  --  118*   ALT U/L 25  --   --  23  --  23   AST U/L 24  --   --  11  --  14    < > = values in this interval not displayed  Results from last 7 days   Lab Units 05/02/21  1518   INR  1 66*   PTT seconds 32     Results from last 7 days   Lab Units 05/05/21  0515   MAGNESIUM mg/dL 1 7     Imaging and other studies: I have personally reviewed pertinent reports     and I have personally reviewed pertinent films in PACS CT of chest performed yesterday shows worsening bilateral consolidation, bilateral pleural effusions, no PE

## 2021-05-07 NOTE — ASSESSMENT & PLAN NOTE
· Secondary to HCAP?  · Patient met the criteria for sepsis with fever and leukocytosis  · Patient also noted to have lactic acidosis and tachypnea  · Repeat lactic acid normal  · Continue antibiotics  · Blood cultures negative x4 days

## 2021-05-07 NOTE — PROGRESS NOTES
[place]  Progress Note - Inessa Coates 1945, 77 y o  male MRN: [id number]  Unit/Bed#: -01 Encounter: [id number]  Primary Care Provider: Lizbet Queen DO   Date and time admitted to hospital: 5/2/2021  2:28 PM    * Pneumonia due to COVID-19 virus  Assessment & Plan  · Patient was diagnosed with COVID on 04/06  · Patient was admitted twice after diagnosis from 4/ 6-4/10 and also from 4/15-4/20   · Patient finished steroids/supportive care  · Came to the hospital with worsening of shortness of breath and chest x-ray was suggestive of worsening of infiltrate  · Given fever possibility of secondary bacterial pneumonia however initial procalcitonin was normal, patient will be started on levofloxacin and vancomycin for healthcare associated pneumonia  · ID consulted on admission  · Continue Levaquin for possible superimposed bacterial infection  · Pulmonary consulted  · Resume IV steroids given worsening of consolidations on CT scan and worsening respiratory status  · Currently on 15L midflow, pulmonology following - appreciate ongoing recommendations  · Possible cardiac etiology for patient's shortness of breath/hypoxia, doubt active COVID infection    Lymphoma ([facility] [address] )  Assessment & Plan  · Follows with Dr Lovely Duran with [facility] Onc [phone]  · Considering lung biopsy, patient currently not stable enough to undergo biopsy  · PET scan was done through [facility]- concerning for illuminating areas, question possible reactive inflammatory response versus tumors    Patient will need outpatient follow-up with his oncologist   · Recommending IVIG- ordered 5/6    Severe sepsis [facility])  Assessment & Plan  · Secondary to HCAP?  · Patient met the criteria for sepsis with fever and leukocytosis  · Patient also noted to have lactic acidosis and tachypnea  · Repeat lactic acid normal  · Continue antibiotics  · Blood cultures negative x4 days    Acute respiratory failure with hypoxia [facility])  Assessment & Plan  · Patient with recent diagnosis of COVID and was on 2 L nasal cannula as outpatient, patient reports that he otherwise was doing well  · Concern for possible cardiac etiology - echocardiogram completed, EF 40% appears similar to prior  · Patient got acutely short of breath prior to admission  · Currently on 15L mid flow  · Will trial IV diuresis given elevated BNP, good urine output- ongoing 40 milligram IV Lasix b i d  · Also recently had PET scan completed as output, working with [facility] onc- nodules and lymph nodes illuminated, question reactive inflammatory response versus tumor  Patient will need outpatient follow-up with his oncologist   · Continue with the supplemental oxygen and wean off as tolerated    Dyslipidemia  Assessment & Plan  · Continue statin    Type 2 diabetes mellitus without complication, without long-term current use of insulin [facility])  Assessment & Plan  Lab Results   Component Value Date    HGBA1C 5 8 (H) 12/30/2020       Recent Labs     05/06/21  1102 05/06/21  2141 05/07/21  0842 05/07/21  1117   POCGLU 246* 270* 141* 253*       Blood Sugar Average: Last 72 hrs:  (P) 872 [id number]   · Hold oral agents while inpatient  · SSI + accuchek  · Diabetic diet    Coronary disease  Assessment & Plan  · Status post CABG  · Continue with the aspirin statin and beta-blocker    VTE Pharmacologic Prophylaxis:   Pharmacologic: Apixaban (Eliquis)  Mechanical VTE Prophylaxis in Place: Yes    Patient Centered Rounds: I have performed bedside rounds with nursing staff today  Discussions with Specialists or Other Care Team Provider:  Discussed with attending  Appreciate input from Pulmonary note  Has been communicating with patient's wife in regards to PET scan and discussions with patient's oncologist    Education and Discussions with Family / Patient:  Discussed with patient at bedside  Attending has been in communication with patient's family      Time Spent for Care: 30 minutes  More than 50% of total time spent on counseling and coordination of care as described above  Current Length of Stay: 5 day(s)    Current Patient Status: Inpatient   Certification Statement: The patient will continue to require additional inpatient hospital stay due to Ongoing oxygen requirements, IV steroids    Discharge Plan:  Patient comes from home, with clinical improvement prior to establishing discharge timeline  Code Status: Level 1 - Full Code      Subjective:   Patient feels stable from yesterday, believes breathing is slightly improved as he is no longer requiring facemask, only the mid flow nasal cannula  Objective:     Vitals:   Temp (24hrs), Av 2 °F (37 3 °C), Min:98 8 °F (37 1 °C), Max:99 6 °F (37 6 °C)    Temp:  [98 8 °F (37 1 °C)-99 6 °F (37 6 °C)] 99 6 °F (37 6 °C)  HR:  [94-97] 97  BP: (111-140)/(55-63) 111/55  SpO2:  [91 %-99 %] 91 %  Body mass index is 20 95 kg/m²  Input and Output Summary (last 24 hours): Intake/Output Summary (Last 24 hours) at 2021 1439  Last data filed at 2021 0900  Gross per 24 hour   Intake 960 ml   Output 3800 ml   Net -2840 ml       Physical Exam:     Physical Exam  Vitals signs and nursing note reviewed  Constitutional:       General: He is not in acute distress  Appearance: Normal appearance  He is well-developed  Interventions: Nasal cannula in place  HENT:      Head: Normocephalic and atraumatic  Eyes:      General: No scleral icterus  Conjunctiva/sclera: Conjunctivae normal    Cardiovascular:      Rate and Rhythm: Normal rate and regular rhythm  Heart sounds: No murmur  Pulmonary:      Effort: Pulmonary effort is normal       Breath sounds: No wheezing, rhonchi or rales  Abdominal:      General: There is no distension  Palpations: Abdomen is soft  Skin:     General: Skin is warm and dry  Neurological:      General: No focal deficit present  Mental Status: He is alert  Psychiatric:         Mood and Affect: Mood normal          Additional Data:     Labs:    Results from last 7 days   Lab Units 05/07/21  0557   WBC Thousand/uL 10 06   HEMOGLOBIN g/dL 7 4*   HEMATOCRIT % 24 6*   PLATELETS Thousands/uL 199   NEUTROS PCT % 79*   LYMPHS PCT % 6*   MONOS PCT % 9   EOS PCT % 6     Results from last 7 days   Lab Units 05/07/21  0557   SODIUM mmol/L 138   POTASSIUM mmol/L 3 2*   CHLORIDE mmol/L 98*   CO2 mmol/L 31   BUN mg/dL 14   CREATININE mg/dL 1 00   ANION GAP mmol/L 9   CALCIUM mg/dL 7 7*   ALBUMIN g/dL 1 7*   TOTAL BILIRUBIN mg/dL 0 90   ALK PHOS U/L 99   ALT U/L 25   AST U/L 24   GLUCOSE RANDOM mg/dL 117     Results from last 7 days   Lab Units 05/02/21  1518   INR  1 66*     Results from last 7 days   Lab Units 05/07/21  1117 05/07/21  0842 05/06/21  2141 05/06/21  1102 05/06/21  0806 05/05/21  2150 05/05/21  1618 05/05/21  1118 05/05/21  0806 05/04/21  2040 05/04/21  1548 05/04/21  1214   POC GLUCOSE mg/dl 253* 141* 270* 246* 163* 228* 174* 198* 137 185* 211* 204*         Results from last 7 days   Lab Units 05/03/21  0536 05/02/21  2048 05/02/21  1825 05/02/21  1518   LACTIC ACID mmol/L  --  2 0 2 7* 3 6*   PROCALCITONIN ng/ml 0 17  --  0 21 0 18           * I Have Reviewed All Lab Data Listed Above  * Additional Pertinent Lab Tests Reviewed: All Labs Within Last 24 Hours Reviewed    Imaging:    Imaging Reports Reviewed Today Include: none  Imaging Personally Reviewed by Myself Includes:  none    Recent Cultures (last 7 days):     Results from last 7 days   Lab Units 05/03/21  0539 05/02/21  1518   BLOOD CULTURE   --  No Growth After 4 Days  No Growth After 4 Days     LEGIONELLA URINARY ANTIGEN  Negative  --        Last 24 Hours Medication List:   Current Facility-Administered Medications   Medication Dose Route Frequency Provider Last Rate    acetaminophen  650 mg Oral Q6H PRN Blaise Renteria MD      albuterol  2 puff Inhalation TID Mojgan Edwards DO      apixaban 2 5 mg Oral BID Mayi Tobin MD      aspirin  81 mg Oral Daily Mayi Tobin MD      atorvastatin  40 mg Oral Daily With Yadira Dutta MD      cholecalciferol  2,000 Units Oral Daily Mayi Tobin MD      ferrous sulfate  325 mg Oral Daily With Breakfast Mayi Tobin MD      furosemide  40 mg Intravenous BID (diuretic) Teja Ruiz PA-C      insulin lispro  2-12 Units Subcutaneous HS [place], [place]      insulin lispro  2-12 Units Subcutaneous TID AC Sumit LIEBERMAN [place], [place]      levofloxacin  750 mg Intravenous Q24H Mayi Tobin  mg (05/06/21 1746)    lisinopril  20 mg Oral Daily Mayi Tobin MD      methylPREDNISolone sodium succinate  40 mg Intravenous Q12H [facility] tucker      metoprolol succinate  100 mg Oral Daily Mayi Tobin MD      multivitamin-minerals  1 tablet Oral Daily Mayi Tobin MD      nitroglycerin  0 4 mg Sublingual Q5 Min PRN Mayi Tobin MD      ondansetron  4 mg Intravenous Q6H PRN Mayi Tobin MD      pantoprazole  40 mg Oral Early Morning Mayi Tobin MD      potassium chloride  20 mEq Intravenous Once [facility] AWA JUSTICE 20 mEq (05/07/21 1349)    saccharomyces boulardii  250 mg Oral BID Mayi Tobin MD          Today, Patient Was Seen By: Kiana Christensen PA-C    ** Please Note: Dictation voice to text software may have been used in the creation of this document   **

## 2021-05-07 NOTE — ASSESSMENT & PLAN NOTE
· Patient was diagnosed with COVID on 04/06  · Patient was admitted twice after diagnosis from 4/ 6-4/10 and also from 4/15-4/20   · Patient finished steroids/supportive care  · Came to the hospital with worsening of shortness of breath and chest x-ray was suggestive of worsening of infiltrate  · Given fever possibility of secondary bacterial pneumonia however initial procalcitonin was normal, patient will be started on levofloxacin and vancomycin for healthcare associated pneumonia  · ID consulted on admission  · Continue Levaquin for possible superimposed bacterial infection  · Pulmonary consulted  · Resume IV steroids given worsening of consolidations on CT scan and worsening respiratory status  · Currently on 15L midflow, pulmonology following - appreciate ongoing recommendations  · Possible cardiac etiology for patient's shortness of breath/hypoxia, doubt active COVID infection

## 2021-05-07 NOTE — PLAN OF CARE
Problem: RESPIRATORY - ADULT  Goal: Achieves optimal ventilation and oxygenation  Description: INTERVENTIONS:  - Assess for changes in respiratory status  - Assess for changes in mentation and behavior  - Position to facilitate oxygenation and minimize respiratory effort  - Oxygen administered by appropriate delivery if ordered  - Initiate smoking cessation education as indicated  - Encourage broncho-pulmonary hygiene including cough, deep breathe, Incentive Spirometry  - Assess the need for suctioning and aspirate as needed  - Assess and instruct to report SOB or any respiratory difficulty  - Respiratory Therapy support as indicated  Outcome: Progressing     Problem: DISCHARGE PLANNING  Goal: Discharge to home or other facility with appropriate resources  Description: INTERVENTIONS:  - Identify barriers to discharge w/patient and caregiver  - Arrange for needed discharge resources and transportation as appropriate  - Identify discharge learning needs (meds, wound care, etc )  - Arrange for interpretive services to assist at discharge as needed  - Refer to Case Management Department for coordinating discharge planning if the patient needs post-hospital services based on physician/advanced practitioner order or complex needs related to functional status, cognitive ability, or social support system  Outcome: Progressing     Problem: Knowledge Deficit  Goal: Patient/family/caregiver demonstrates understanding of disease process, treatment plan, medications, and discharge instructions  Description: Complete learning assessment and assess knowledge base    Interventions:  - Provide teaching at level of understanding  - Provide teaching via preferred learning methods  Outcome: Progressing     Problem: PAIN - ADULT  Goal: Verbalizes/displays adequate comfort level or baseline comfort level  Description: Interventions:  - Encourage patient to monitor pain and request assistance  - Assess pain using appropriate pain scale  - Administer analgesics based on type and severity of pain and evaluate response  - Implement non-pharmacological measures as appropriate and evaluate response  - Consider cultural and social influences on pain and pain management  - Notify physician/advanced practitioner if interventions unsuccessful or patient reports new pain  Outcome: Progressing     Problem: INFECTION - ADULT  Goal: Absence or prevention of progression during hospitalization  Description: INTERVENTIONS:  - Assess and monitor for signs and symptoms of infection  - Monitor lab/diagnostic results  - Monitor all insertion sites, i e  indwelling lines, tubes, and drains  - Monitor endotracheal if appropriate and nasal secretions for changes in amount and color  - [place] appropriate cooling/warming therapies per order  - Administer medications as ordered  - Instruct and encourage patient and family to use good hand hygiene technique  - Identify and instruct in appropriate isolation precautions for identified infection/condition  Outcome: Progressing  Goal: Absence of fever/infection during neutropenic period  Description: INTERVENTIONS:  - Monitor WBC    Outcome: Progressing     Problem: SAFETY ADULT  Goal: Patient will remain free of falls  Description: INTERVENTIONS:  - Assess patient frequently for physical needs  -  Identify cognitive and physical deficits and behaviors that affect risk of falls    -  [place] fall precautions as indicated by assessment   - Educate patient/family on patient safety including physical limitations  - Instruct patient to call for assistance with activity based on assessment  - Modify environment to reduce risk of injury  - Consider OT/PT consult to assist with strengthening/mobility  Outcome: Progressing  Goal: Maintain or return to baseline ADL function  Description: INTERVENTIONS:  -  Assess patient's ability to carry out ADLs; assess patient's baseline for ADL function and identify physical deficits which impact ability to perform ADLs (bathing, care of mouth/teeth, toileting, grooming, dressing, etc )  - Assess/evaluate cause of self-care deficits   - Assess range of motion  - Assess patient's mobility; develop plan if impaired  - Assess patient's need for assistive devices and provide as appropriate  - Encourage maximum independence but intervene and supervise when necessary  - Involve family in performance of ADLs  - Assess for home care needs following discharge   - Consider OT consult to assist with ADL evaluation and planning for discharge  - Provide patient education as appropriate  Outcome: Progressing  Goal: Maintain or return mobility status to optimal level  Description: INTERVENTIONS:  - Assess patient's baseline mobility status (ambulation, transfers, stairs, etc )    - Identify cognitive and physical deficits and behaviors that affect mobility  - Identify mobility aids required to assist with transfers and/or ambulation (gait belt, sit-to-stand, lift, walker, cane, etc )  - [place] fall precautions as indicated by assessment  - Record patient progress and toleration of activity level on Mobility SBAR; progress patient to next Phase/Stage  - Instruct patient to call for assistance with activity based on assessment  - Consider rehabilitation consult to assist with strengthening/weightbearing, etc   Outcome: Progressing     Problem: Potential for Falls  Goal: Patient will remain free of falls  Description: INTERVENTIONS:  - Assess patient frequently for physical needs  -  Identify cognitive and physical deficits and behaviors that affect risk of falls    -  [place] fall precautions as indicated by assessment   - Educate patient/family on patient safety including physical limitations  - Instruct patient to call for assistance with activity based on assessment  - Modify environment to reduce risk of injury  - Consider OT/PT consult to assist with strengthening/mobility  Outcome: Progressing

## 2021-05-08 LAB
ABO GROUP BLD BPU: NORMAL
ALBUMIN SERPL BCP-MCNC: 1.8 G/DL (ref 3.5–5)
ALP SERPL-CCNC: 104 U/L (ref 46–116)
ALT SERPL W P-5'-P-CCNC: 33 U/L (ref 12–78)
ANION GAP SERPL CALCULATED.3IONS-SCNC: 8 MMOL/L (ref 4–13)
AST SERPL W P-5'-P-CCNC: 31 U/L (ref 5–45)
BACTERIA BLD CULT: NORMAL
BASOPHILS # BLD AUTO: 0.01 THOUSANDS/ΜL (ref 0–0.1)
BASOPHILS NFR BLD AUTO: 0 % (ref 0–1)
BILIRUB SERPL-MCNC: 0.8 MG/DL (ref 0.2–1)
BPU ID: NORMAL
BUN SERPL-MCNC: 26 MG/DL (ref 5–25)
CALCIUM ALBUM COR SERPL-MCNC: 10 MG/DL (ref 8.3–10.1)
CALCIUM SERPL-MCNC: 8.2 MG/DL (ref 8.3–10.1)
CHLORIDE SERPL-SCNC: 96 MMOL/L (ref 100–108)
CO2 SERPL-SCNC: 33 MMOL/L (ref 21–32)
CREAT SERPL-MCNC: 1.05 MG/DL (ref 0.6–1.3)
CRP SERPL QL: 139.9 MG/L
CRP SERPL QL: 163.7 MG/L
D DIMER PPP FEU-MCNC: 3.23 UG/ML FEU
EOSINOPHIL # BLD AUTO: 0 THOUSAND/ΜL (ref 0–0.61)
EOSINOPHIL NFR BLD AUTO: 0 % (ref 0–6)
ERYTHROCYTE [DISTWIDTH] IN BLOOD BY AUTOMATED COUNT: 18 % (ref 11.6–15.1)
GFR SERPL CREATININE-BSD FRML MDRD: 70 ML/MIN/1.73SQ M
GLUCOSE SERPL-MCNC: 185 MG/DL (ref 65–140)
GLUCOSE SERPL-MCNC: 272 MG/DL (ref 65–140)
GLUCOSE SERPL-MCNC: 345 MG/DL (ref 65–140)
GLUCOSE SERPL-MCNC: 361 MG/DL (ref 65–140)
GLUCOSE SERPL-MCNC: 398 MG/DL (ref 65–140)
GLUCOSE SERPL-MCNC: 416 MG/DL (ref 65–140)
HCT VFR BLD AUTO: 27.4 % (ref 36.5–49.3)
HGB BLD-MCNC: 8.3 G/DL (ref 12–17)
IMM GRANULOCYTES # BLD AUTO: 0.04 THOUSAND/UL (ref 0–0.2)
IMM GRANULOCYTES NFR BLD AUTO: 0 % (ref 0–2)
LYMPHOCYTES # BLD AUTO: 0.49 THOUSANDS/ΜL (ref 0.6–4.47)
LYMPHOCYTES NFR BLD AUTO: 5 % (ref 14–44)
MCH RBC QN AUTO: 23.9 PG (ref 26.8–34.3)
MCHC RBC AUTO-ENTMCNC: 30.3 G/DL (ref 31.4–37.4)
MCV RBC AUTO: 79 FL (ref 82–98)
MONOCYTES # BLD AUTO: 0.55 THOUSAND/ΜL (ref 0.17–1.22)
MONOCYTES NFR BLD AUTO: 5 % (ref 4–12)
NEUTROPHILS # BLD AUTO: 9.12 THOUSANDS/ΜL (ref 1.85–7.62)
NEUTS SEG NFR BLD AUTO: 90 % (ref 43–75)
NRBC BLD AUTO-RTO: 0 /100 WBCS
PLATELET # BLD AUTO: 257 THOUSANDS/UL (ref 149–390)
PMV BLD AUTO: 11.5 FL (ref 8.9–12.7)
POTASSIUM SERPL-SCNC: 3.6 MMOL/L (ref 3.5–5.3)
PROT SERPL-MCNC: 6.1 G/DL (ref 6.4–8.2)
RBC # BLD AUTO: 3.47 MILLION/UL (ref 3.88–5.62)
SODIUM SERPL-SCNC: 137 MMOL/L (ref 136–145)
UNIT DISPENSE STATUS: NORMAL
UNIT PRODUCT CODE: NORMAL
UNIT RH: NORMAL
WBC # BLD AUTO: 10.21 THOUSAND/UL (ref 4.31–10.16)

## 2021-05-08 PROCEDURE — 82948 REAGENT STRIP/BLOOD GLUCOSE: CPT

## 2021-05-08 PROCEDURE — 85379 FIBRIN DEGRADATION QUANT: CPT | Performed by: PHYSICIAN ASSISTANT

## 2021-05-08 PROCEDURE — 86140 C-REACTIVE PROTEIN: CPT | Performed by: PHYSICIAN ASSISTANT

## 2021-05-08 PROCEDURE — 99232 SBSQ HOSP IP/OBS MODERATE 35: CPT | Performed by: INTERNAL MEDICINE

## 2021-05-08 PROCEDURE — 94760 N-INVAS EAR/PLS OXIMETRY 1: CPT

## 2021-05-08 PROCEDURE — 85025 COMPLETE CBC W/AUTO DIFF WBC: CPT | Performed by: PHYSICIAN ASSISTANT

## 2021-05-08 PROCEDURE — 86140 C-REACTIVE PROTEIN: CPT | Performed by: INTERNAL MEDICINE

## 2021-05-08 PROCEDURE — 80053 COMPREHEN METABOLIC PANEL: CPT | Performed by: PHYSICIAN ASSISTANT

## 2021-05-08 RX ORDER — SIMETHICONE 80 MG
80 TABLET,CHEWABLE ORAL EVERY 6 HOURS PRN
Status: DISCONTINUED | OUTPATIENT
Start: 2021-05-08 | End: 2021-05-20 | Stop reason: HOSPADM

## 2021-05-08 RX ORDER — LANOLIN ALCOHOL/MO/W.PET/CERES
6 CREAM (GRAM) TOPICAL
Status: DISCONTINUED | OUTPATIENT
Start: 2021-05-08 | End: 2021-05-20 | Stop reason: HOSPADM

## 2021-05-08 RX ORDER — FUROSEMIDE 20 MG/1
20 TABLET ORAL
Status: DISCONTINUED | OUTPATIENT
Start: 2021-05-08 | End: 2021-05-08

## 2021-05-08 RX ORDER — FUROSEMIDE 40 MG/1
40 TABLET ORAL
Status: DISCONTINUED | OUTPATIENT
Start: 2021-05-08 | End: 2021-05-12

## 2021-05-08 RX ADMIN — Medication 250 MG: at 17:55

## 2021-05-08 RX ADMIN — ALBUTEROL SULFATE 2 PUFF: 90 AEROSOL, METERED RESPIRATORY (INHALATION) at 21:52

## 2021-05-08 RX ADMIN — PANTOPRAZOLE SODIUM 40 MG: 40 TABLET, DELAYED RELEASE ORAL at 05:33

## 2021-05-08 RX ADMIN — INSULIN LISPRO 20 UNITS: 100 INJECTION, SOLUTION INTRAVENOUS; SUBCUTANEOUS at 17:56

## 2021-05-08 RX ADMIN — Medication 250 MG: at 08:53

## 2021-05-08 RX ADMIN — LISINOPRIL 20 MG: 20 TABLET ORAL at 08:52

## 2021-05-08 RX ADMIN — Medication 2000 UNITS: at 08:53

## 2021-05-08 RX ADMIN — SIMETHICONE 80 MG: 80 TABLET, CHEWABLE ORAL at 11:01

## 2021-05-08 RX ADMIN — METOPROLOL SUCCINATE 100 MG: 50 TABLET, EXTENDED RELEASE ORAL at 08:52

## 2021-05-08 RX ADMIN — ALBUTEROL SULFATE 2 PUFF: 90 AEROSOL, METERED RESPIRATORY (INHALATION) at 08:51

## 2021-05-08 RX ADMIN — FUROSEMIDE 40 MG: 40 TABLET ORAL at 17:55

## 2021-05-08 RX ADMIN — MELATONIN TAB 3 MG 6 MG: 3 TAB at 21:44

## 2021-05-08 RX ADMIN — INSULIN LISPRO 2 UNITS: 100 INJECTION, SOLUTION INTRAVENOUS; SUBCUTANEOUS at 08:51

## 2021-05-08 RX ADMIN — ATORVASTATIN CALCIUM 40 MG: 40 TABLET, FILM COATED ORAL at 17:55

## 2021-05-08 RX ADMIN — METHYLPREDNISOLONE SODIUM SUCCINATE 40 MG: 40 INJECTION, POWDER, FOR SOLUTION INTRAMUSCULAR; INTRAVENOUS at 08:55

## 2021-05-08 RX ADMIN — ALBUTEROL SULFATE 2 PUFF: 90 AEROSOL, METERED RESPIRATORY (INHALATION) at 14:55

## 2021-05-08 RX ADMIN — INSULIN LISPRO 10 UNITS: 100 INJECTION, SOLUTION INTRAVENOUS; SUBCUTANEOUS at 21:49

## 2021-05-08 RX ADMIN — FUROSEMIDE 40 MG: 10 INJECTION, SOLUTION INTRAVENOUS at 08:45

## 2021-05-08 RX ADMIN — REMDESIVIR 100 MG: 100 INJECTION, POWDER, LYOPHILIZED, FOR SOLUTION INTRAVENOUS at 18:30

## 2021-05-08 RX ADMIN — APIXABAN 2.5 MG: 2.5 TABLET, FILM COATED ORAL at 17:56

## 2021-05-08 RX ADMIN — INSULIN LISPRO 10 UNITS: 100 INJECTION, SOLUTION INTRAVENOUS; SUBCUTANEOUS at 11:01

## 2021-05-08 RX ADMIN — LEVOFLOXACIN 750 MG: 5 INJECTION, SOLUTION INTRAVENOUS at 17:55

## 2021-05-08 RX ADMIN — MULTIPLE VITAMINS W/ MINERALS TAB 1 TABLET: TAB ORAL at 08:52

## 2021-05-08 RX ADMIN — FERROUS SULFATE TAB 325 MG (65 MG ELEMENTAL FE) 325 MG: 325 (65 FE) TAB at 08:52

## 2021-05-08 RX ADMIN — ASPIRIN 81 MG CHEWABLE TABLET 81 MG: 81 TABLET CHEWABLE at 08:52

## 2021-05-08 RX ADMIN — METHYLPREDNISOLONE SODIUM SUCCINATE 40 MG: 40 INJECTION, POWDER, FOR SOLUTION INTRAMUSCULAR; INTRAVENOUS at 20:09

## 2021-05-08 RX ADMIN — APIXABAN 2.5 MG: 2.5 TABLET, FILM COATED ORAL at 08:53

## 2021-05-08 NOTE — ASSESSMENT & PLAN NOTE
· Follows with Dr Glo Hawkins with [facility]  Phyllis Dcs [phone]  · Considering lung biopsy, patient currently not stable enough to undergo biopsy  · PET scan was done through [facility]- concerning for illuminating areas, question possible reactive inflammatory response versus tumors    Patient will need outpatient follow-up with his oncologist   · Recommending IVIG- ordered 5/6

## 2021-05-08 NOTE — PLAN OF CARE
Problem: RESPIRATORY - ADULT  Goal: Achieves optimal ventilation and oxygenation  Description: INTERVENTIONS:  - Assess for changes in respiratory status  - Assess for changes in mentation and behavior  - Position to facilitate oxygenation and minimize respiratory effort  - Oxygen administered by appropriate delivery if ordered  - Initiate smoking cessation education as indicated  - Encourage broncho-pulmonary hygiene including cough, deep breathe, Incentive Spirometry  - Assess the need for suctioning and aspirate as needed  - Assess and instruct to report SOB or any respiratory difficulty  - Respiratory Therapy support as indicated  Outcome: Progressing     Problem: DISCHARGE PLANNING  Goal: Discharge to home or other facility with appropriate resources  Description: INTERVENTIONS:  - Identify barriers to discharge w/patient and caregiver  - Arrange for needed discharge resources and transportation as appropriate  - Identify discharge learning needs (meds, wound care, etc )  - Arrange for interpretive services to assist at discharge as needed  - Refer to Case Management Department for coordinating discharge planning if the patient needs post-hospital services based on physician/advanced practitioner order or complex needs related to functional status, cognitive ability, or social support system  Outcome: Progressing     Problem: Knowledge Deficit  Goal: Patient/family/caregiver demonstrates understanding of disease process, treatment plan, medications, and discharge instructions  Description: Complete learning assessment and assess knowledge base    Interventions:  - Provide teaching at level of understanding  - Provide teaching via preferred learning methods  Outcome: Progressing     Problem: PAIN - ADULT  Goal: Verbalizes/displays adequate comfort level or baseline comfort level  Description: Interventions:  - Encourage patient to monitor pain and request assistance  - Assess pain using appropriate pain scale  - Administer analgesics based on type and severity of pain and evaluate response  - Implement non-pharmacological measures as appropriate and evaluate response  - Consider cultural and social influences on pain and pain management  - Notify physician/advanced practitioner if interventions unsuccessful or patient reports new pain  Outcome: Progressing     Problem: INFECTION - ADULT  Goal: Absence or prevention of progression during hospitalization  Description: INTERVENTIONS:  - Assess and monitor for signs and symptoms of infection  - Monitor lab/diagnostic results  - Monitor all insertion sites, i e  indwelling lines, tubes, and drains  - Monitor endotracheal if appropriate and nasal secretions for changes in amount and color  - [place] appropriate cooling/warming therapies per order  - Administer medications as ordered  - Instruct and encourage patient and family to use good hand hygiene technique  - Identify and instruct in appropriate isolation precautions for identified infection/condition  Outcome: Progressing  Goal: Absence of fever/infection during neutropenic period  Description: INTERVENTIONS:  - Monitor WBC    Outcome: Progressing     Problem: SAFETY ADULT  Goal: Patient will remain free of falls  Description: INTERVENTIONS:  - Assess patient frequently for physical needs  -  Identify cognitive and physical deficits and behaviors that affect risk of falls    -  [place] fall precautions as indicated by assessment   - Educate patient/family on patient safety including physical limitations  - Instruct patient to call for assistance with activity based on assessment  - Modify environment to reduce risk of injury  - Consider OT/PT consult to assist with strengthening/mobility  Outcome: Progressing  Goal: Maintain or return to baseline ADL function  Description: INTERVENTIONS:  -  Assess patient's ability to carry out ADLs; assess patient's baseline for ADL function and identify physical deficits which impact ability to perform ADLs (bathing, care of mouth/teeth, toileting, grooming, dressing, etc )  - Assess/evaluate cause of self-care deficits   - Assess range of motion  - Assess patient's mobility; develop plan if impaired  - Assess patient's need for assistive devices and provide as appropriate  - Encourage maximum independence but intervene and supervise when necessary  - Involve family in performance of ADLs  - Assess for home care needs following discharge   - Consider OT consult to assist with ADL evaluation and planning for discharge  - Provide patient education as appropriate  Outcome: Progressing  Goal: Maintain or return mobility status to optimal level  Description: INTERVENTIONS:  - Assess patient's baseline mobility status (ambulation, transfers, stairs, etc )    - Identify cognitive and physical deficits and behaviors that affect mobility  - Identify mobility aids required to assist with transfers and/or ambulation (gait belt, sit-to-stand, lift, walker, cane, etc )  - [place] fall precautions as indicated by assessment  - Record patient progress and toleration of activity level on Mobility SBAR; progress patient to next Phase/Stage  - Instruct patient to call for assistance with activity based on assessment  - Consider rehabilitation consult to assist with strengthening/weightbearing, etc   Outcome: Progressing     Problem: Potential for Falls  Goal: Patient will remain free of falls  Description: INTERVENTIONS:  - Assess patient frequently for physical needs  -  Identify cognitive and physical deficits and behaviors that affect risk of falls    -  [place] fall precautions as indicated by assessment   - Educate patient/family on patient safety including physical limitations  - Instruct patient to call for assistance with activity based on assessment  - Modify environment to reduce risk of injury  - Consider OT/PT consult to assist with strengthening/mobility  Outcome: Progressing

## 2021-05-08 NOTE — ASSESSMENT & PLAN NOTE
Wt Readings from Last 3 Encounters:   05/08/21 67 8 kg (149 lb 6 4 oz)   04/20/21 73 2 kg (161 lb 6 oz)   04/10/21 76 8 kg (169 lb 5 oz)     · Diuretics initially held on admission - home regimen includes Lasix 20 mg twice daily patient reports compliance - overall on exam appears euvolemic  · Elevated BNP on admission around 7 K, repeat showed 11 K  · Concern for possible cardiac etiology for shortness of breath/worsening hypoxia  · Obtain echocardiogram - EF 40% appears similar to prior  · Good urine output with lasix 40 mg BID  · Diurese as tolerated, monitor creatinine, if creatinine peaks, will resume home dose diuretics    · Transition back to home diuretics 5/8, but increase dose to 40mg BID  · CTA does suggest pulmonary effusion and congestion

## 2021-05-08 NOTE — PROGRESS NOTES
[place]  Progress Note - Michelle Anderson 1945, 77 y o  male MRN: [id number]  Unit/Bed#: -01 Encounter: [id number]  Primary Care Provider: You Lazo DO   Date and time admitted to hospital: 5/2/2021  2:28 PM    * Pneumonia due to COVID-19 virus  Assessment & Plan  · Patient was diagnosed with COVID on 04/06  · Patient was admitted twice after diagnosis from 4/ 6-4/10 and also from 4/15-4/20   · Patient finished steroids/supportive care  · Came to the hospital with worsening of shortness of breath and chest x-ray was suggestive of worsening of infiltrate  · Given fever possibility of secondary bacterial pneumonia however initial procalcitonin was normal, patient on levofloxacin and vancomycin for healthcare associated pneumonia  · ID consulted on admission  · Continue Levaquin for possible superimposed bacterial infection  · Patient received convalescent plasma 5/7 and re-initiated remdesivir  · Repeat COVID-19 test-5/7 however, could be false negative    · Continue to follow closely with ID  · Pulmonary consulted  · Resume IV steroids given worsening of consolidations on CT scan and worsening respiratory status  · Currently on 15L midflow, pulmonology following - appreciate ongoing recommendations  · Possible cardiac etiology for patient's shortness of breath/hypoxia, doubt active COVID infection    Chronic diastolic congestive heart failure (HCC)  Assessment & Plan  Wt Readings from Last 3 Encounters:   05/08/21 67 8 kg (149 lb 6 4 oz)   04/20/21 73 2 kg (161 lb 6 oz)   04/10/21 76 8 kg (169 lb 5 oz)     · Diuretics initially held on admission - home regimen includes Lasix 20 mg twice daily patient reports compliance - overall on exam appears euvolemic  · Elevated BNP on admission around 7 K, repeat showed 11 K  · Concern for possible cardiac etiology for shortness of breath/worsening hypoxia  · Obtain echocardiogram - EF 40% appears similar to prior  · Good urine output with lasix 40 mg BID  · Diurese as tolerated, monitor creatinine, if creatinine peaks, will resume home dose diuretics  · Transition back to home diuretics 5/8, but increase dose to 40mg BID  · CTA does suggest pulmonary effusion and congestion    Lymphoma ([facility] )  Assessment & Plan  · Follows with Dr Cuco Canela with [facility] [phone]  · Considering lung biopsy, patient currently not stable enough to undergo biopsy  · PET scan was done through [facility]- concerning for illuminating areas, question possible reactive inflammatory response versus tumors  Patient will need outpatient follow-up with his oncologist   · Recommending IVIG- ordered 5/6    Severe sepsis [facility])  Assessment & Plan  · Secondary to HCAP?  · Patient met the criteria for sepsis with fever and leukocytosis  · Patient also noted to have lactic acidosis and tachypnea  · Repeat lactic acid normal  · Continue antibiotics  · Blood cultures negative x5 days    Paroxysmal atrial fibrillation ([facility] )  Assessment & Plan  · Patient is on Eliquis as outpatient  · Rate controlled on metoprolol  · Continue with Eliquis    Acute [facility] failure with [facility] ([facility] )  Assessment & Plan  · Patient with recent diagnosis of COVID and was on 2 L nasal cannula as outpatient, patient reports that he otherwise was doing well  · Concern for possible cardiac etiology - echocardiogram completed, EF 40% appears similar to prior  · Patient got acutely short of breath prior to admission  · Currently on 15L mid flow  · Will trial IV diuresis given elevated BNP, good urine output-  40 milligram IV Lasix b i d   · Transition back to home dose Lasix 20mg BID 5/8  · Also recently had PET scan completed as output, working with [facility] onc- nodules and lymph nodes illuminated, question reactive inflammatory response versus tumor    Patient will need outpatient follow-up with his oncologist   · Continue with the supplemental oxygen and wean off as tolerated    Dyslipidemia  Assessment & Plan  · Continue statin    Type 2 diabetes mellitus without complication, without long-term current use of insulin [facility])  Assessment & Plan  Lab Results   Component Value Date    HGBA1C 5 8 (H) 2020       Recent Labs     21  1602 21  2057 21  0842 21  1100   POCGLU 214* 333* 185* 361*       Blood Sugar Average: Last 72 hrs:  (P) 223 [id number]   · Hold oral agents while inpatient  · SSI + accuchek  · Diabetic diet, patient has been unable to tolerate artificial sweeteners  · Will resume regular diet and increase sliding scale insulin regimen    Coronary disease  Assessment & Plan  · Status post CABG  · Continue with the aspirin statin and beta-blocker        VTE Pharmacologic Prophylaxis: VTE Score: 5 High Risk (Score >/= 5) - Pharmacological DVT Prophylaxis Ordered: apixaban (Eliquis)  Sequential Compression Devices Ordered  Patient Centered Rounds: I performed bedside rounds with nursing staff today  Discussions with Specialists or Other Care Team Provider: Discussed with     Education and Discussions with Family / Patient: Updated  (wife) via phone  Time Spent for Care: 30 minutes  More than 50% of total time spent on counseling and coordination of care as described above  Current Length of Stay: 6 day(s)  Current Patient Status: Inpatient   Certification Statement: The patient will continue to require additional inpatient hospital stay due to ongoing increased O2 need, IV Remdesivir  Discharge Plan: Anticipate discharge in >72 hrs to home with home services  Code Status: Level 1 - Full Code    Subjective:   Patient had poor sleep overnight, he suspects this is due to steroids      Objective:     Vitals:   Temp (24hrs), Av 1 °F (36 7 °C), Min:96 2 °F (35 7 °C), Max:98 9 °F (37 2 °C)    Temp:  [96 2 °F (35 7 °C)-98 9 °F (37 2 °C)] 96 2 °F (35 7 °C)  HR:  [69-97] 85  Resp:  [16-22] 20  BP: (122-138)/(57-68) 122/57  SpO2:  [90 %-99 %] 95 %  Body mass index is 20 84 kg/m²  Input and Output Summary (last 24 hours): Intake/Output Summary (Last 24 hours) at 5/8/2021 1134  Last data filed at 5/8/2021 0900  Gross per 24 hour   Intake 825 ml   Output 975 ml   Net -150 ml       Physical Exam:   Physical Exam  Vitals signs and nursing note reviewed  Constitutional:       General: He is not in acute distress  Appearance: Normal appearance  He is well-developed  Interventions: Nasal cannula in place  HENT:      Head: Normocephalic and atraumatic  Eyes:      General: No scleral icterus  Conjunctiva/sclera: Conjunctivae normal    Cardiovascular:      Rate and Rhythm: Normal rate and regular rhythm  Heart sounds: Murmur present  Pulmonary:      Effort: Pulmonary effort is normal       Breath sounds: No wheezing, rhonchi or rales  Abdominal:      General: There is no distension  Palpations: Abdomen is soft  Skin:     General: Skin is warm and dry  Neurological:      General: No focal deficit present  Mental Status: He is alert     Psychiatric:         Mood and Affect: Mood normal           Additional Data:     Labs:  Results from last 7 days   Lab Units 05/07/21  0557   WBC Thousand/uL 10 06   HEMOGLOBIN g/dL 7 4*   HEMATOCRIT % 24 6*   PLATELETS Thousands/uL 199   NEUTROS PCT % 79*   LYMPHS PCT % 6*   MONOS PCT % 9   EOS PCT % 6     Results from last 7 days   Lab Units 05/07/21  0557   SODIUM mmol/L 138   POTASSIUM mmol/L 3 2*   CHLORIDE mmol/L 98*   CO2 mmol/L 31   BUN mg/dL 14   CREATININE mg/dL 1 00   ANION GAP mmol/L 9   CALCIUM mg/dL 7 7*   ALBUMIN g/dL 1 7*   TOTAL BILIRUBIN mg/dL 0 90   ALK PHOS U/L 99   ALT U/L 25   AST U/L 24   GLUCOSE RANDOM mg/dL 117     Results from last 7 days   Lab Units 05/02/21  1518   INR  1 66*     Results from last 7 days   Lab Units 05/08/21  1100 05/08/21  0842 05/07/21  2057 05/07/21  1602 05/07/21  1117 05/07/21  0842 05/06/21  2141 05/06/21  1102 05/06/21  0806 05/05/21  2150 05/05/21  1618 05/05/21  1118   POC GLUCOSE mg/dl 361* 185* 333* 214* 253* 141* 270* 246* 163* 228* 174* 198*         Results from last 7 days   Lab Units 05/03/21  0536 05/02/21  2048 05/02/21  1825 05/02/21  1518   LACTIC ACID mmol/L  --  2 0 2 7* 3 6*   PROCALCITONIN ng/ml 0 17  --  0 21 0 18       Lines/Drains:  Invasive Devices     Peripheral Intravenous Line            Peripheral IV 05/06/21 Dorsal (posterior); Left Wrist 2 days    Peripheral IV 05/06/21 Right;Upper;Ventral (anterior) Arm 1 day                      Imaging: No pertinent imaging reviewed  Recent Cultures (last 7 days):   Results from last 7 days   Lab Units 05/03/21  0539 05/02/21  1518   BLOOD CULTURE   --  No Growth After 5 Days  No Growth After 5 Days     LEGIONELLA URINARY ANTIGEN  Negative  --        Last 24 Hours Medication List:   Current Facility-Administered Medications   Medication Dose Route Frequency Provider Last Rate    acetaminophen  650 mg Oral Q6H PRN Ren Weber MD      albuterol  2 puff Inhalation TID Gray Ge DO      apixaban  2 5 mg Oral BID Ren Weber MD      aspirin  81 mg Oral Daily Ren Weber MD      atorvastatin  40 mg Oral Daily With Cesar Crain MD      cholecalciferol  2,000 Units Oral Daily Ren Weber MD      ferrous sulfate  325 mg Oral Daily With Breakfast Ren Weber MD      furosemide  40 mg Oral BID (diuretic) Mariano JUSTICE PA-C      insulin lispro  2-12 Units Subcutaneous HS Munira JUSTICE PA-C      insulin lispro  4-20 Units Subcutaneous TID AC Munira JUSTICE PA-C      levofloxacin  750 mg Intravenous Q24H Liang Navarro  mg (05/07/21 1711)    lisinopril  20 mg Oral Daily Ren Weber MD      methylPREDNISolone sodium succinate  40 mg Intravenous Q12H [address] Marie Portillo DO      metoprolol succinate  100 mg Oral Daily Ren Weber MD      multivitamin-minerals  1 tablet Oral Daily Vern Graves Hossein Rosen MD      nitroglycerin  0 4 mg Sublingual Q5 Min PRN Roseann Montgomery MD      ondansetron  4 mg Intravenous Q6H PRN Roseann Montgomery MD      pantoprazole  40 mg Oral Early Morning Wash MD Ulises      remdesivir  100 mg Intravenous Q24H [place], [place]      saccharomyces boulardii  250 mg Oral BID Wash MD Ulises      simethicone  80 mg Oral Q6H PRN Claire Courtney PA-C          Today, Patient Was Seen By: Genna Harvey PA-C    **Please Note: This note may have been constructed using a voice recognition system  **

## 2021-05-08 NOTE — ASSESSMENT & PLAN NOTE
· Patient with recent diagnosis of COVID and was on 2 L nasal cannula as outpatient, patient reports that he otherwise was doing well  · Concern for possible cardiac etiology - echocardiogram completed, EF 40% appears similar to prior  · Patient got acutely short of breath prior to admission  · Currently on 15L mid flow  · Will trial IV diuresis given elevated BNP, good urine output-  40 milligram IV Lasix b i d   · Transition back to home dose Lasix 20mg BID 5/8  · Also recently had PET scan completed as output, working with [facility] onc- nodules and lymph nodes illuminated, question reactive inflammatory response versus tumor    Patient will need outpatient follow-up with his oncologist   · Continue with the supplemental oxygen and wean off as tolerated

## 2021-05-08 NOTE — ASSESSMENT & PLAN NOTE
· Patient was diagnosed with COVID on 04/06  · Patient was admitted twice after diagnosis from 4/ 6-4/10 and also from 4/15-4/20   · Patient finished steroids/supportive care  · Came to the hospital with worsening of shortness of breath and chest x-ray was suggestive of worsening of infiltrate  · Given fever possibility of secondary bacterial pneumonia however initial procalcitonin was normal, patient on levofloxacin and vancomycin for healthcare associated pneumonia  · ID consulted on admission  · Continue Levaquin for possible superimposed bacterial infection  · Patient received convalescent plasma 5/7 and re-initiated remdesivir  · Repeat COVID-19 test-5/7 however, could be false negative    · Continue to follow closely with ID  · Pulmonary consulted  · Resume IV steroids given worsening of consolidations on CT scan and worsening respiratory status  · Currently on 15L midflow, pulmonology following - appreciate ongoing recommendations  · Possible cardiac etiology for patient's shortness of breath/hypoxia, doubt active COVID infection

## 2021-05-08 NOTE — PROGRESS NOTES
Tanvi Skinner  77 y o   male  1945  mrn [id number]    Assessment/Plan:  1  Sepsis/Pneumonia/? COVID19 infxn with pneumonia/Fever/Leukocytosis/  Hypoxia: No further fever and WBC has decreased to 10K - recent leukocytosis was most likely from dose of Solumedrol that was given in the ER  He remains hypoxic and supplemental O2 has increased to 15L by [address]  Chest CT shows increased pulmonary infilts but no PE  COVID19 IgG is neg so Pt has not developed immunity to recent COVID19 infxn due to his underlying lymphoma  Concerned that respiratory deterioration is due to ongoing COVID19 infxn  Pts with lymphoma have been reported to clear the virus slowly  Repeat CRP has increased to 160 1 and Ferritin is 348  Pt received tx with IVIG on 5/6 as directed by his Oncologist which probably has some COVID19 Ab's    BNP was markedly elevated so there was probably a component of vol overload that was contributing to his current pulmonary sx's  He is being diuresed  Repeat BNP has decreased to 6501  Repeat CXR did not show any signficant improvement with mild increase in RUL infilt      On admission, Pt had fever and leukocytosis, so need to be concerned with presence of pneumonia  Procalcitonin level was not markedly elevated but lactic acid was increased suggestive of sepsis  Urine Legionella Ag and Strep pneumo Ag were neg  Bld cx's are neg  MRSA screen  Inflammatory markers are still elevated from his recent COVID19 infxn which was tx'd with Remdesivir and decadron during his previous  admission from 4/6 to 4/10/21  He was re-admitted from 4/15 to 4/20 with increasing SOB and was tx'd with steroids       Pt presented with acute onset of increasing SOB and cough x 1 day       A  Cont Levaquin x 7 days for possible pneumonia   B  Would repeat COVID19 PCR test   C   Since COVID19 Ab test is neg and chest CT shows increasing pulmonary infilts with worsening hypoxia, would tx with Remdesivir again since Pts with underlying lymphoma may take longer to clear the virus and would give convalescent plasma  D  Agree with starting steroid tx  E  Cont diuresis as needed  F  Cont enhanced respiratory isolation for now  G  Cont supplemental O2 as needed  H  Will follow inflammatory markers and LFT's  I  Will re-eval Pt on Monday, please contact ID physician OC (Dr Grace Christensen - cell  no  [phone]) if any new probs arise        Subjective: Breathing is better this afternoon following initiation of steroid tx - has not needed to use NRB mask with [address]    Objective:  Tmax: 99 6  Lungs: +Few crackles  Abd: +BS, soft, nontender  Ext: No calf tenderness    Labs:  CBC w/diff  Recent Labs     05/07/21  0557   WBC 10 06   HGB 7 4*   HCT 24 6*      NEUTOPHILPCT 79*   LYMPHOPCT 6*   MONOPCT 9   EOSPCT 6     BMP  Recent Labs     05/07/21  0557   K 3 2*   CL 98*   CO2 31   BUN 14   CREATININE 1 00   CALCIUM 7 7*     CMP  Recent Labs     05/07/21  0557   K 3 2*   CL 98*   CO2 31   BUN 14   CREATININE 1 00   CALCIUM 7 7*   ALKPHOS 99   ALT 25   AST 24        labrc    Cultures:  Lab Results   Component Value Date    BLOODCX No Growth After 4 Days  05/02/2021    BLOODCX No Growth After 4 Days  05/02/2021    BLOODCX No Growth After 5 Days  04/15/2021    BLOODCX No Growth After 5 Days  04/15/2021    BLOODCX No Growth After 5 Days  04/06/2021    BLOODCX No Growth After 5 Days  04/06/2021    BLOODCX No Growth After 5 Days  04/01/2021    BLOODCX No Growth After 5 Days  04/01/2021    BLOODCX No Growth After 5 Days  02/27/2019    BLOODCX No Growth After 5 Days  02/27/2019    BLOODCX No Growth After 5 Days  11/25/2018    BLOODCX No Growth After 5 Days   11/25/2018     No results found for: WOUNDCULT  No results found for: URINECX  No results found for: SPUTUMCULTUR    MED:  Levaquin: #6      Completed:  Vanco x 4 days on 5/5      Current Facility-Administered Medications:     acetaminophen (TYLENOL) tablet 650 mg, 650 mg, Oral, Q6H PRN, Goyo Vilchis, MD    albuterol (PROVENTIL HFA,VENTOLIN HFA) inhaler 2 puff, 2 puff, Inhalation, TID, Kinza Bledsoe DO, 2 puff at 05/07/21 1958    apixaban (ELIQUIS) tablet 2 5 mg, 2 5 mg, Oral, BID, Ellen Alberto MD, 2 5 mg at 05/07/21 1711    aspirin chewable tablet 81 mg, 81 mg, Oral, Daily, Ellen Alberto MD, 81 mg at 05/07/21 0847    atorvastatin (LIPITOR) tablet 40 mg, 40 mg, Oral, Daily With Gerber MD Yoseph, 40 mg at 05/07/21 1711    cholecalciferol (VITAMIN D3) tablet 2,000 Units, 2,000 Units, Oral, Daily, Ellen Alberto MD, 2,000 Units at 05/07/21 0847    ferrous sulfate tablet 325 mg, 325 mg, Oral, Daily With Breakfast, Ellen Alberto MD, 325 mg at 05/07/21 0846    furosemide (LASIX) injection 40 mg, 40 mg, Intravenous, BID (diuretic), Marquis Tennille PA-C, 40 mg at 05/07/21 1715    insulin lispro (HumaLOG) 100 units/mL subcutaneous injection 2-12 Units, 2-12 Units, Subcutaneous, HS, Marquis Tennille PA-C, 8 Units at 05/07/21 2123    insulin lispro (HumaLOG) 100 units/mL subcutaneous injection 2-12 Units, 2-12 Units, Subcutaneous, TID AC, 4 Units at 05/07/21 1715 **AND** Fingerstick Glucose (POCT), , , TID AC, Marquis Tennille PA-C    levofloxacin (LEVAQUIN) IVPB (premix in dextrose) 750 mg 150 mL, 750 mg, Intravenous, Q24H, Ravinder Christensen MD, Last Rate: 100 mL/hr at 05/07/21 1711, 750 mg at 05/07/21 1711    lisinopril (ZESTRIL) tablet 20 mg, 20 mg, Oral, Daily, Ellen Alberto MD, 20 mg at 05/07/21 0846    methylPREDNISolone sodium succinate (Solu-MEDROL) injection 40 mg, 40 mg, Intravenous, Q12H [address], Marie Stahlnecker, , 40 mg at 05/07/21 2001    metoprolol succinate (TOPROL-XL) 24 hr tablet 100 mg, 100 mg, Oral, Daily, Ellen Alberto MD, 100 mg at 05/07/21 0846    multivitamin-minerals (CENTRUM ADULTS) tablet 1 tablet, 1 tablet, Oral, Daily, Ellen Alberto MD, 1 tablet at 05/07/21 0846    nitroglycerin (NITROSTAT) SL tablet 0 4 mg, 0 4 mg, Sublingual, Q5 Min PRN, Ellen Alberto MD    ondansetron (ZOFRAN) injection 4 mg, 4 mg, Intravenous, Q6H PRN, Slim Yang MD    pantoprazole (PROTONIX) EC tablet 40 mg, 40 mg, Oral, Early Morning, Slim Yang MD, 40 mg at 05/07/21 2506    [COMPLETED] remdesivir (Veklury) 200 mg in sodium chloride 0 9 % 250 mL IVPB, 200 mg, Intravenous, Q24H, 200 mg at 05/07/21 1711 **FOLLOWED BY** [START ON 5/8/2021] remdesivir (Veklury) 100 mg in sodium chloride 0 9 % 250 mL IVPB, 100 mg, Intravenous, Q24H, Munira JUSTICE PA-C    saccharomyces boulardii (FLORASTOR) capsule 250 mg, 250 mg, Oral, BID, Slim Yang MD, 250 mg at 05/07/21 1711    Principal Problem:    Pneumonia due to COVID-19 virus  Active Problems:    Coronary disease    Type 2 diabetes mellitus without complication, without long-term current use of insulin (HCC)    Dyslipidemia    [facility] )    Acute respiratory failure with hypoxia (HCC)    Chronic diastolic congestive heart failure (HCC)    [facility] fibrillation ([facility] )    Severe sepsis ([facility] )      Adriana Sebastian MD

## 2021-05-08 NOTE — ASSESSMENT & PLAN NOTE
· Secondary to HCAP?  · Patient met the criteria for sepsis with fever and leukocytosis  · Patient also noted to have lactic acidosis and tachypnea  · Repeat lactic acid normal  · Continue antibiotics  · Blood cultures negative x5 days

## 2021-05-08 NOTE — ASSESSMENT & PLAN NOTE
Lab Results   Component Value Date    HGBA1C 5 8 (H) 12/30/2020       Recent Labs     05/07/21  1602 05/07/21  2057 05/08/21  0842 05/08/21  1100   POCGLU 214* 333* 185* 361*       Blood Sugar Average: Last 72 hrs:  (P) 223 [id number]   · Hold oral agents while inpatient  · SSI + accuchek  · Diabetic diet, patient has been unable to tolerate artificial sweeteners  · Will resume regular diet and increase sliding scale insulin regimen

## 2021-05-09 LAB
ALBUMIN SERPL BCP-MCNC: 1.6 G/DL (ref 3.5–5)
ALP SERPL-CCNC: 127 U/L (ref 46–116)
ALT SERPL W P-5'-P-CCNC: 42 U/L (ref 12–78)
ANION GAP SERPL CALCULATED.3IONS-SCNC: 6 MMOL/L (ref 4–13)
AST SERPL W P-5'-P-CCNC: 44 U/L (ref 5–45)
BASOPHILS # BLD AUTO: 0.02 THOUSANDS/ΜL (ref 0–0.1)
BASOPHILS NFR BLD AUTO: 0 % (ref 0–1)
BILIRUB SERPL-MCNC: 0.6 MG/DL (ref 0.2–1)
BILIRUB UR QL STRIP: NEGATIVE
BUN SERPL-MCNC: 32 MG/DL (ref 5–25)
CALCIUM ALBUM COR SERPL-MCNC: 9.9 MG/DL (ref 8.3–10.1)
CALCIUM SERPL-MCNC: 8 MG/DL (ref 8.3–10.1)
CHLORIDE SERPL-SCNC: 97 MMOL/L (ref 100–108)
CLARITY UR: CLEAR
CO2 SERPL-SCNC: 34 MMOL/L (ref 21–32)
COLOR UR: YELLOW
CREAT SERPL-MCNC: 1.12 MG/DL (ref 0.6–1.3)
EOSINOPHIL # BLD AUTO: 0 THOUSAND/ΜL (ref 0–0.61)
EOSINOPHIL NFR BLD AUTO: 0 % (ref 0–6)
ERYTHROCYTE [DISTWIDTH] IN BLOOD BY AUTOMATED COUNT: 18.3 % (ref 11.6–15.1)
FERRITIN SERPL-MCNC: 467 NG/ML (ref 8–388)
GFR SERPL CREATININE-BSD FRML MDRD: 64 ML/MIN/1.73SQ M
GLUCOSE SERPL-MCNC: 248 MG/DL (ref 65–140)
GLUCOSE SERPL-MCNC: 259 MG/DL (ref 65–140)
GLUCOSE SERPL-MCNC: 261 MG/DL (ref 65–140)
GLUCOSE SERPL-MCNC: 270 MG/DL (ref 65–140)
GLUCOSE SERPL-MCNC: 376 MG/DL (ref 65–140)
GLUCOSE SERPL-MCNC: 461 MG/DL (ref 65–140)
GLUCOSE UR STRIP-MCNC: ABNORMAL MG/DL
HCT VFR BLD AUTO: 24.6 % (ref 36.5–49.3)
HGB BLD-MCNC: 7.5 G/DL (ref 12–17)
HGB UR QL STRIP.AUTO: NEGATIVE
IMM GRANULOCYTES # BLD AUTO: 0.04 THOUSAND/UL (ref 0–0.2)
IMM GRANULOCYTES NFR BLD AUTO: 0 % (ref 0–2)
KETONES UR STRIP-MCNC: NEGATIVE MG/DL
LEUKOCYTE ESTERASE UR QL STRIP: NEGATIVE
LYMPHOCYTES # BLD AUTO: 0.71 THOUSANDS/ΜL (ref 0.6–4.47)
LYMPHOCYTES NFR BLD AUTO: 6 % (ref 14–44)
MCH RBC QN AUTO: 23.7 PG (ref 26.8–34.3)
MCHC RBC AUTO-ENTMCNC: 30.5 G/DL (ref 31.4–37.4)
MCV RBC AUTO: 78 FL (ref 82–98)
MONOCYTES # BLD AUTO: 0.66 THOUSAND/ΜL (ref 0.17–1.22)
MONOCYTES NFR BLD AUTO: 5 % (ref 4–12)
NEUTROPHILS # BLD AUTO: 10.7 THOUSANDS/ΜL (ref 1.85–7.62)
NEUTS SEG NFR BLD AUTO: 89 % (ref 43–75)
NITRITE UR QL STRIP: NEGATIVE
PH UR STRIP.AUTO: 6 [PH]
PLATELET # BLD AUTO: 260 THOUSANDS/UL (ref 149–390)
PMV BLD AUTO: 10.6 FL (ref 8.9–12.7)
POTASSIUM SERPL-SCNC: 3.6 MMOL/L (ref 3.5–5.3)
PROT SERPL-MCNC: 5.4 G/DL (ref 6.4–8.2)
PROT UR STRIP-MCNC: NEGATIVE MG/DL
RBC # BLD AUTO: 3.16 MILLION/UL (ref 3.88–5.62)
SODIUM SERPL-SCNC: 137 MMOL/L (ref 136–145)
SP GR UR STRIP.AUTO: 1.02 (ref 1–1.03)
UROBILINOGEN UR QL STRIP.AUTO: 0.2 E.U./DL
WBC # BLD AUTO: 12.13 THOUSAND/UL (ref 4.31–10.16)

## 2021-05-09 PROCEDURE — 81003 URINALYSIS AUTO W/O SCOPE: CPT | Performed by: INTERNAL MEDICINE

## 2021-05-09 PROCEDURE — 99232 SBSQ HOSP IP/OBS MODERATE 35: CPT | Performed by: INTERNAL MEDICINE

## 2021-05-09 PROCEDURE — 94760 N-INVAS EAR/PLS OXIMETRY 1: CPT

## 2021-05-09 PROCEDURE — 85025 COMPLETE CBC W/AUTO DIFF WBC: CPT | Performed by: INTERNAL MEDICINE

## 2021-05-09 PROCEDURE — 82728 ASSAY OF FERRITIN: CPT | Performed by: INTERNAL MEDICINE

## 2021-05-09 PROCEDURE — 82948 REAGENT STRIP/BLOOD GLUCOSE: CPT

## 2021-05-09 PROCEDURE — 80053 COMPREHEN METABOLIC PANEL: CPT | Performed by: INTERNAL MEDICINE

## 2021-05-09 RX ORDER — INSULIN GLARGINE 100 [IU]/ML
8 INJECTION, SOLUTION SUBCUTANEOUS
Status: DISCONTINUED | OUTPATIENT
Start: 2021-05-09 | End: 2021-05-10

## 2021-05-09 RX ADMIN — ALBUTEROL SULFATE 2 PUFF: 90 AEROSOL, METERED RESPIRATORY (INHALATION) at 17:00

## 2021-05-09 RX ADMIN — FERROUS SULFATE TAB 325 MG (65 MG ELEMENTAL FE) 325 MG: 325 (65 FE) TAB at 10:05

## 2021-05-09 RX ADMIN — APIXABAN 2.5 MG: 2.5 TABLET, FILM COATED ORAL at 17:00

## 2021-05-09 RX ADMIN — Medication 250 MG: at 10:06

## 2021-05-09 RX ADMIN — FUROSEMIDE 40 MG: 40 TABLET ORAL at 10:09

## 2021-05-09 RX ADMIN — PANTOPRAZOLE SODIUM 40 MG: 40 TABLET, DELAYED RELEASE ORAL at 06:25

## 2021-05-09 RX ADMIN — INSULIN GLARGINE 8 UNITS: 100 INJECTION, SOLUTION SUBCUTANEOUS at 21:44

## 2021-05-09 RX ADMIN — METHYLPREDNISOLONE SODIUM SUCCINATE 40 MG: 40 INJECTION, POWDER, FOR SOLUTION INTRAMUSCULAR; INTRAVENOUS at 21:44

## 2021-05-09 RX ADMIN — FUROSEMIDE 40 MG: 40 TABLET ORAL at 17:00

## 2021-05-09 RX ADMIN — APIXABAN 2.5 MG: 2.5 TABLET, FILM COATED ORAL at 10:07

## 2021-05-09 RX ADMIN — METOPROLOL SUCCINATE 100 MG: 50 TABLET, EXTENDED RELEASE ORAL at 10:07

## 2021-05-09 RX ADMIN — ASPIRIN 81 MG CHEWABLE TABLET 81 MG: 81 TABLET CHEWABLE at 10:05

## 2021-05-09 RX ADMIN — MULTIPLE VITAMINS W/ MINERALS TAB 1 TABLET: TAB ORAL at 10:07

## 2021-05-09 RX ADMIN — INSULIN LISPRO 6 UNITS: 100 INJECTION, SOLUTION INTRAVENOUS; SUBCUTANEOUS at 21:44

## 2021-05-09 RX ADMIN — ALBUTEROL SULFATE 2 PUFF: 90 AEROSOL, METERED RESPIRATORY (INHALATION) at 07:57

## 2021-05-09 RX ADMIN — INSULIN LISPRO 20 UNITS: 100 INJECTION, SOLUTION INTRAVENOUS; SUBCUTANEOUS at 17:00

## 2021-05-09 RX ADMIN — INSULIN LISPRO 8 UNITS: 100 INJECTION, SOLUTION INTRAVENOUS; SUBCUTANEOUS at 07:56

## 2021-05-09 RX ADMIN — Medication 250 MG: at 17:00

## 2021-05-09 RX ADMIN — Medication 2000 UNITS: at 10:06

## 2021-05-09 RX ADMIN — ATORVASTATIN CALCIUM 40 MG: 40 TABLET, FILM COATED ORAL at 17:00

## 2021-05-09 RX ADMIN — REMDESIVIR 100 MG: 100 INJECTION, POWDER, LYOPHILIZED, FOR SOLUTION INTRAVENOUS at 17:01

## 2021-05-09 RX ADMIN — LISINOPRIL 20 MG: 20 TABLET ORAL at 10:05

## 2021-05-09 RX ADMIN — METHYLPREDNISOLONE SODIUM SUCCINATE 40 MG: 40 INJECTION, POWDER, FOR SOLUTION INTRAMUSCULAR; INTRAVENOUS at 10:05

## 2021-05-09 RX ADMIN — MELATONIN TAB 3 MG 6 MG: 3 TAB at 21:44

## 2021-05-09 RX ADMIN — INSULIN LISPRO 16 UNITS: 100 INJECTION, SOLUTION INTRAVENOUS; SUBCUTANEOUS at 12:54

## 2021-05-09 RX ADMIN — ALBUTEROL SULFATE 2 PUFF: 90 AEROSOL, METERED RESPIRATORY (INHALATION) at 21:44

## 2021-05-09 NOTE — PROGRESS NOTES
[place]  Progress Note - Reid Ferguson 1945, 77 y o  male MRN: [id number]  Unit/Bed#: -01 SDU Encounter: [id number]  Primary Care Provider: Rafy Cheung DO   Date and time admitted to hospital: 5/2/2021  2:28 PM    * Pneumonia due to COVID-19 virus  Assessment & Plan  · Patient was diagnosed with COVID on 04/06  · Patient was admitted twice after diagnosis from 4/ 6-4/10 and also from 4/15-4/20   · Patient finished steroids/supportive care  · Came to the hospital with worsening of shortness of breath and chest x-ray was suggestive of worsening of infiltrate  · Given fever possibility of secondary bacterial pneumonia however initial procalcitonin was normal, patient on levofloxacin and vancomycin for healthcare associated pneumonia  · ID consulted on admission  · Continue Levaquin for possible superimposed bacterial infection  · Patient received convalescent plasma 5/7 and re-initiated remdesivir  · Repeat COVID-19 test-5/7 however, could be false negative    · Continue to follow closely with ID  · Pulmonary consulted  · Resume IV steroids given worsening of consolidations on CT scan and worsening respiratory status  · Currently on 15L midflow, pulmonology following - appreciate ongoing recommendations  · Possible cardiac etiology for patient's shortness of breath/hypoxia, doubt active COVID infection    Chronic diastolic congestive heart failure (HCC)  Assessment & Plan  Wt Readings from Last 3 Encounters:   05/09/21 67 9 kg (149 lb 11 1 oz)   04/20/21 73 2 kg (161 lb 6 oz)   04/10/21 76 8 kg (169 lb 5 oz)     · Diuretics initially held on admission - home regimen includes Lasix 20 mg twice daily patient reports compliance - overall on exam appears euvolemic  · Elevated BNP on admission around 7 K, repeat showed 11 K  · Concern for possible cardiac etiology for shortness of breath/worsening hypoxia  · Obtain echocardiogram - EF 40% appears similar to prior  · Good urine output with lasix 40 mg BID  · Diurese as tolerated, monitor creatinine, if creatinine peaks, will resume home dose diuretics  · Transition back to home diuretics 5/8, but increase dose to 40mg BID  · CTA does suggest pulmonary effusion and congestion    Lymphoma ([facility] [address] )  Assessment & Plan  · Follow[facility] with [facility] [phone]  · Considering lung biopsy, patient currently not stable enough to undergo biopsy  · PET scan was done through [facility]- concerning for illuminating areas, question possible reactive inflammatory response versus tumors  Patient will need outpatient follow-up with his oncologist   · Recommending IVIG- ordered 5/6    Severe sepsis [facility])  Assessment & Plan  · Secondary to HCAP?  · Patient met the criteria for sepsis with fever and leukocytosis  · Patient also noted to have lactic acidosis and tachypnea  · Repeat lactic acid normal  · Continue antibiotics  · Blood cultures negative x5 days    Paroxysmal atrial fibrillation ([facility] [address] )  Assessment & Plan  · Patient is on Eliquis as outpatient  · Rate controlled on metoprolol  · Continue with Eliquis    Acute respiratory failure with hypoxia ([facility] [address] )  Assessment & Plan  · Patient with recent diagnosis of COVID and was on 2 L nasal cannula as outpatient, patient reports that he otherwise was doing well  · Concern for possible cardiac etiology - echocardiogram completed, EF 40% appears similar to prior  · Patient got acutely short of breath prior to admission  · Currently on 10L mid flow  · Will trial IV diuresis given elevated BNP, good urine output-  40 milligram IV Lasix b i d   · Transition back to home dose Lasix 20mg BID 5/8  · Also recently had PET scan completed as output, working with [facility] onc- nodules and lymph nodes illuminated, question reactive inflammatory response versus tumor    Patient will need outpatient follow-up with his oncologist   · Continue with the supplemental oxygen and wean off as tolerated    Dyslipidemia  Assessment & Plan  · Continue statin    Type 2 diabetes mellitus without complication, without long-term current use of insulin [facility])  Assessment & Plan  Lab Results   Component Value Date    HGBA1C 5 8 (H) 12/30/2020       Recent Labs     05/08/21  2100 05/08/21  2326 05/09/21  0337 05/09/21  0755   POCGLU 398* 345* 270* 261*       Blood Sugar Average: Last 72 hrs:  (P) 275 [id number]   · Hold oral agents while inpatient  · SSI + accuchek  · Discontinued Diabetic diet, patient has been unable to tolerate artificial sweeteners  · Will resume regular diet and increase sliding scale insulin regimen  · Started on Lantus 8 units at bedtime 5/9 given persistently elevated sugars    Coronary disease  Assessment & Plan  · Status post CABG  · Continue with the aspirin statin and beta-blocker        VTE Pharmacologic Prophylaxis: VTE Score: 5 High Risk (Score >/= 5) - Pharmacological DVT Prophylaxis Ordered: apixaban (Eliquis)  Sequential Compression Devices Ordered  Patient Centered Rounds: I performed bedside rounds with nursing staff today  Discussions with Specialists or Other Care Team Provider: Appreciate input from Pulmonary note    Education and Discussions with Family / Patient: Updated  (wife) via phone  Time Spent for Care: 30 minutes  More than 50% of total time spent on counseling and coordination of care as described above  Current Length of Stay: 7 day(s)  Current Patient Status: Inpatient   Certification Statement: The patient will continue to require additional inpatient hospital stay due to Ongoing O2 requirements and IV Remdesivir  Discharge Plan: Anticipate discharge in >72 hrs to home      Code Status: Level 1 - Full Code    Subjective:     Patient still reports poor appetite but is feeling somewhat better today than he did yesterday, he slept more last night than he did the night previously and he is encouraged by decreased oxygen requirement  Objective:     Vitals:   Temp (24hrs), Av 1 °F (36 2 °C), Min:96 8 °F (36 °C), Max:97 4 °F (36 3 °C)    Temp:  [96 8 °F (36 °C)-97 4 °F (36 3 °C)] 97 4 °F (36 3 °C)  HR:  [76-96] 82  Resp:  [18-29] 23  BP: ()/(53-93) 128/60  SpO2:  [90 %-100 %] 99 %  Body mass index is 20 88 kg/m²  Input and Output Summary (last 24 hours): Intake/Output Summary (Last 24 hours) at 2021 1322  Last data filed at 2021 1253  Gross per 24 hour   Intake 1750 ml   Output 950 ml   Net 800 ml       Physical Exam:   Physical Exam  Vitals signs and nursing note reviewed  Constitutional:       General: He is not in acute distress  Appearance: Normal appearance  He is well-developed  HENT:      Head: Normocephalic and atraumatic  Eyes:      General: No scleral icterus  Conjunctiva/sclera: Conjunctivae normal    Cardiovascular:      Rate and Rhythm: Normal rate and regular rhythm  Heart sounds: No murmur  Pulmonary:      Effort: Pulmonary effort is normal       Breath sounds: Decreased air movement present  Decreased breath sounds present  No wheezing, rhonchi or rales  Abdominal:      General: There is no distension  Palpations: Abdomen is soft  Skin:     General: Skin is warm and dry  Neurological:      General: No focal deficit present  Mental Status: He is alert     Psychiatric:         Mood and Affect: Mood normal          Additional Data:     Labs:  Results from last 7 days   Lab Units 21  0332   WBC Thousand/uL 12 13*   HEMOGLOBIN g/dL 7 5*   HEMATOCRIT % 24 6*   PLATELETS Thousands/uL 260   NEUTROS PCT % 89*   LYMPHS PCT % 6*   MONOS PCT % 5   EOS PCT % 0     Results from last 7 days   Lab Units 21  0334   SODIUM mmol/L 137   POTASSIUM mmol/L 3 6   CHLORIDE mmol/L 97*   CO2 mmol/L 34*   BUN mg/dL 32*   CREATININE mg/dL 1 12   ANION GAP mmol/L 6   CALCIUM mg/dL 8 0*   ALBUMIN g/dL 1 6*   TOTAL BILIRUBIN mg/dL 0 60   ALK PHOS U/L 127*   ALT U/L 42   AST U/L 44   GLUCOSE RANDOM mg/dL 248*     Results from last 7 days   Lab Units 05/02/21  1518   INR  1 66*     Results from last 7 days   Lab Units 05/09/21  1136 05/09/21  0755 05/09/21  0337 05/08/21  2326 05/08/21  2100 05/08/21  1723 05/08/21  1100 05/08/21  0842 05/07/21  2057 05/07/21  1602 05/07/21  1117 05/07/21  0842   POC GLUCOSE mg/dl 376* 261* 270* 345* 398* 416* 361* 185* 333* 214* 253* 141*         Results from last 7 days   Lab Units 05/03/21  0536 05/02/21  2048 05/02/21  1825 05/02/21  1518   LACTIC ACID mmol/L  --  2 0 2 7* 3 6*   PROCALCITONIN ng/ml 0 17  --  0 21 0 18       Lines/Drains:  Invasive Devices     Peripheral Intravenous Line            Peripheral IV 05/06/21 Dorsal (posterior); Left Wrist 3 days    Peripheral IV 05/06/21 Right;Upper;Ventral (anterior) Arm 2 days                      Imaging: No pertinent imaging reviewed  Recent Cultures (last 7 days):   Results from last 7 days   Lab Units 05/03/21  0539 05/02/21  1518   BLOOD CULTURE   --  No Growth After 5 Days  No Growth After 5 Days     LEGIONELLA URINARY ANTIGEN  Negative  --        Last 24 Hours Medication List:   Current Facility-Administered Medications   Medication Dose Route Frequency Provider Last Rate    acetaminophen  650 mg Oral Q6H PRN Chanell Hernandez MD      albuterol  2 puff Inhalation TID Peoantonio Hernandez DO      apixaban  2 5 mg Oral BID Chanell Hernandez MD      aspirin  81 mg Oral Daily Chanell Hernandez MD      atorvastatin  40 mg Oral Daily With Serena Swanson MD      cholecalciferol  2,000 Units Oral Daily Chanell Hernandez MD      ferrous sulfate  325 mg Oral Daily With Breakfast Chanell Hernandez MD      furosemide  40 mg Oral BID (diuretic) Kaity Hotter AWA JUSTICE      insulin glargine  8 Units Subcutaneous HS MORGAN Cast-FLACO      insulin lispro  2-12 Units Subcutaneous HS MORGAN Cast-FLACO      insulin lispro  4-20 Units Subcutaneous TID AC Munira JUSTICE PA-C      lisinopril  20 mg Oral Daily Kayley Yousif MD      melatonin  6 mg Oral HS Tamara Burnett [place], [address]      methylPREDNISolone sodium succinate  40 mg Intravenous Q12H [facility] Catalina Yu,       metoprolol succinate  100 mg Oral Daily Kayley Yousif MD      multivitamin-minerals  1 tablet Oral Daily Kayley Yousif MD      nitroglycerin  0 4 mg Sublingual Q5 Min PRN Kayley Yousif MD      ondansetron  4 mg Intravenous Q6H PRN Kayley Yousif MD      pantoprazole  40 mg Oral Early Morning Kayley Yousif MD      remdesivir  100 mg Intravenous Q24H [address] [place] [address] Stopped (05/08/21 1900)   [place] saccharomyces boulardii  250 mg Oral BID Kayley Yousif MD      simethicone  80 mg Oral Q6H PRN Jaxon Bernal PA-C          Today, Patient Was Seen By: Valentino Curtis PA-C    **Please Note: This note may have been constructed using a voice recognition system  **

## 2021-05-09 NOTE — PLAN OF CARE
Problem: RESPIRATORY - ADULT  Goal: Achieves optimal ventilation and oxygenation  Description: INTERVENTIONS:  - Assess for changes in respiratory status  - Assess for changes in mentation and behavior  - Position to facilitate oxygenation and minimize respiratory effort  - Oxygen administered by appropriate delivery if ordered  - Initiate smoking cessation education as indicated  - Encourage broncho-pulmonary hygiene including cough, deep breathe, Incentive Spirometry  - Assess the need for suctioning and aspirate as needed  - Assess and instruct to report SOB or any respiratory difficulty  - Respiratory Therapy support as indicated  Outcome: Progressing     Problem: DISCHARGE PLANNING  Goal: Discharge to home or other facility with appropriate resources  Description: INTERVENTIONS:  - Identify barriers to discharge w/patient and caregiver  - Arrange for needed discharge resources and transportation as appropriate  - Identify discharge learning needs (meds, wound care, etc )  - Arrange for interpretive services to assist at discharge as needed  - Refer to Case Management Department for coordinating discharge planning if the patient needs post-hospital services based on physician/advanced practitioner order or complex needs related to functional status, cognitive ability, or social support system  Outcome: Progressing     Problem: Knowledge Deficit  Goal: Patient/family/caregiver demonstrates understanding of disease process, treatment plan, medications, and discharge instructions  Description: Complete learning assessment and assess knowledge base    Interventions:  - Provide teaching at level of understanding  - Provide teaching via preferred learning methods  Outcome: Progressing     Problem: PAIN - ADULT  Goal: Verbalizes/displays adequate comfort level or baseline comfort level  Description: Interventions:  - Encourage patient to monitor pain and request assistance  - Assess pain using appropriate pain scale  - Administer analgesics based on type and severity of pain and evaluate response  - Implement non-pharmacological measures as appropriate and evaluate response  - Consider cultural and social influences on pain and pain management  - Notify physician/advanced practitioner if interventions unsuccessful or patient reports new pain  Outcome: Progressing     Problem: INFECTION - ADULT  Goal: Absence or prevention of progression during hospitalization  Description: INTERVENTIONS:  - Assess and monitor for signs and symptoms of infection  - Monitor lab/diagnostic results  - Monitor all insertion sites, i e  indwelling lines, tubes, and drains  - Monitor endotracheal if appropriate and nasal secretions for changes in amount and color  - Wade appropriate cooling/warming therapies per order  - Administer medications as ordered  - Instruct and encourage patient and family to use good hand hygiene technique  - Identify and instruct in appropriate isolation precautions for identified infection/condition  Outcome: Progressing  Goal: Absence of fever/infection during neutropenic period  Description: INTERVENTIONS:  - Monitor WBC    Outcome: Progressing     Problem: SAFETY ADULT  Goal: Patient will remain free of falls  Description: INTERVENTIONS:  - Assess patient frequently for physical needs  -  Identify cognitive and physical deficits and behaviors that affect risk of falls    -  Wade fall precautions as indicated by assessment   - Educate patient/family on patient safety including physical limitations  - Instruct patient to call for assistance with activity based on assessment  - Modify environment to reduce risk of injury  - Consider OT/PT consult to assist with strengthening/mobility  Outcome: Progressing  Goal: Maintain or return to baseline ADL function  Description: INTERVENTIONS:  -  Assess patient's ability to carry out ADLs; assess patient's baseline for ADL function and identify physical deficits which impact ability to perform ADLs (bathing, care of mouth/teeth, toileting, grooming, dressing, etc )  - Assess/evaluate cause of self-care deficits   - Assess range of motion  - Assess patient's mobility; develop plan if impaired  - Assess patient's need for assistive devices and provide as appropriate  - Encourage maximum independence but intervene and supervise when necessary  - Involve family in performance of ADLs  - Assess for home care needs following discharge   - Consider OT consult to assist with ADL evaluation and planning for discharge  - Provide patient education as appropriate  Outcome: Progressing  Goal: Maintain or return mobility status to optimal level  Description: INTERVENTIONS:  - Assess patient's baseline mobility status (ambulation, transfers, stairs, etc )    - Identify cognitive and physical deficits and behaviors that affect mobility  - Identify mobility aids required to assist with transfers and/or ambulation (gait belt, sit-to-stand, lift, walker, cane, etc )  - Snyder fall precautions as indicated by assessment  - Record patient progress and toleration of activity level on Mobility SBAR; progress patient to next Phase/Stage  - Instruct patient to call for assistance with activity based on assessment  - Consider rehabilitation consult to assist with strengthening/weightbearing, etc   Outcome: Progressing     Problem: Potential for Falls  Goal: Patient will remain free of falls  Description: INTERVENTIONS:  - Assess patient frequently for physical needs  -  Identify cognitive and physical deficits and behaviors that affect risk of falls    -  Snyder fall precautions as indicated by assessment   - Educate patient/family on patient safety including physical limitations  - Instruct patient to call for assistance with activity based on assessment  - Modify environment to reduce risk of injury  - Consider OT/PT consult to assist with strengthening/mobility  Outcome: Progressing

## 2021-05-09 NOTE — ASSESSMENT & PLAN NOTE
Lab Results   Component Value Date    HGBA1C 5 8 (H) 12/30/2020       Recent Labs     05/08/21  2100 05/08/21  2326 05/09/21  0337 05/09/21  0755   POCGLU 398* 345* 270* 261*       Blood Sugar Average: Last 72 hrs:  (P) 275 [id number]   · Hold oral agents while inpatient  · SSI + accuchek  · Discontinued Diabetic diet, patient has been unable to tolerate artificial sweeteners  · Will resume regular diet and increase sliding scale insulin regimen  · Started on Lantus 8 units at bedtime 5/9 given persistently elevated sugars

## 2021-05-09 NOTE — ASSESSMENT & PLAN NOTE
· Follows with Dr Tamara Flaherty with [address]  [place] [phone]  · Considering lung biopsy, patient currently not stable enough to undergo biopsy  · PET scan was done through [facility]- concerning for illuminating areas, question possible reactive inflammatory response versus tumors    Patient will need outpatient follow-up with his oncologist   · Recommending IVIG- ordered 5/6

## 2021-05-09 NOTE — ASSESSMENT & PLAN NOTE
Wt Readings from Last 3 Encounters:   05/09/21 67 9 kg (149 lb 11 1 oz)   04/20/21 73 2 kg (161 lb 6 oz)   04/10/21 76 8 kg (169 lb 5 oz)     · Diuretics initially held on admission - home regimen includes Lasix 20 mg twice daily patient reports compliance - overall on exam appears euvolemic  · Elevated BNP on admission around 7 K, repeat showed 11 K  · Concern for possible cardiac etiology for shortness of breath/worsening hypoxia  · Obtain echocardiogram - EF 40% appears similar to prior  · Good urine output with lasix 40 mg BID  · Diurese as tolerated, monitor creatinine, if creatinine peaks, will resume home dose diuretics    · Transition back to home diuretics 5/8, but increase dose to 40mg BID  · CTA does suggest pulmonary effusion and congestion

## 2021-05-09 NOTE — PROGRESS NOTES
Pt sitting comfortably in recliner, on 5L [address] no complaints at this time, call bell within reach  Instructed to ring for assistance when getting OOB

## 2021-05-09 NOTE — NURSING NOTE
Pt with high blood sugars today  At 2100 check blood sugar was 398 - notified NP - gave HS dose of insulin and checked in one hour  Upon recheck blood sugar was 345 - pt stated that he had just ate a candy bar because he did not think I was coming back to check his sugar again tonight  NP notified of situation  Will recheck sugar in two hours

## 2021-05-09 NOTE — ASSESSMENT & PLAN NOTE
· Patient with recent diagnosis of COVID and was on 2 L nasal cannula as outpatient, patient reports that he otherwise was doing well  · Concern for possible cardiac etiology - echocardiogram completed, EF 40% appears similar to prior  · Patient got acutely short of breath prior to admission  · Currently on 10L mid flow  · Will trial IV diuresis given elevated BNP, good urine output-  40 milligram IV Lasix b i d   · Transition back to home dose Lasix 20mg BID 5/8  · Also recently had PET scan completed as output, working with [facility] onc- nodules and lymph nodes illuminated, question reactive inflammatory response versus tumor    Patient will need outpatient follow-up with his oncologist   · Continue with the supplemental oxygen and wean off as tolerated

## 2021-05-09 NOTE — PROGRESS NOTES
Thousand/uL 12 13* 10 21* 10 06   HEMOGLOBIN g/dL 7 5* 8 3* 7 4*   HEMATOCRIT % 24 6* 27 4* 24 6*   PLATELETS Thousands/uL 260 257 199         Results from last 7 days   Lab Units 05/09/21  0334 05/08/21  1249 05/07/21  0557   POTASSIUM mmol/L 3 6 3 6 3 2*   CHLORIDE mmol/L 97* 96* 98*   CO2 mmol/L 34* 33* 31   BUN mg/dL 32* 26* 14   CREATININE mg/dL 1 12 1 05 1 00   CALCIUM mg/dL 8 0* 8 2* 7 7*   ALK PHOS U/L 127* 104 99   ALT U/L 42 33 25   AST U/L 44 31 24     Results from last 7 days   Lab Units 05/02/21  1518   INR  1 66*   PTT seconds 32     Results from last 7 days   Lab Units 05/05/21  0515   MAGNESIUM mg/dL 1 7

## 2021-05-10 LAB
ALBUMIN SERPL BCP-MCNC: 2.1 G/DL (ref 3.5–5)
ALP SERPL-CCNC: 124 U/L (ref 46–116)
ALT SERPL W P-5'-P-CCNC: 42 U/L (ref 12–78)
ANION GAP SERPL CALCULATED.3IONS-SCNC: 7 MMOL/L (ref 4–13)
AST SERPL W P-5'-P-CCNC: 28 U/L (ref 5–45)
BASOPHILS # BLD AUTO: 0.01 THOUSANDS/ΜL (ref 0–0.1)
BASOPHILS NFR BLD AUTO: 0 % (ref 0–1)
BILIRUB SERPL-MCNC: 0.7 MG/DL (ref 0.2–1)
BUN SERPL-MCNC: 31 MG/DL (ref 5–25)
CALCIUM ALBUM COR SERPL-MCNC: 9.5 MG/DL (ref 8.3–10.1)
CALCIUM SERPL-MCNC: 8 MG/DL (ref 8.3–10.1)
CHLORIDE SERPL-SCNC: 100 MMOL/L (ref 100–108)
CO2 SERPL-SCNC: 34 MMOL/L (ref 21–32)
CREAT SERPL-MCNC: 1.11 MG/DL (ref 0.6–1.3)
CRP SERPL QL: 54.8 MG/L
D DIMER PPP FEU-MCNC: 6.16 UG/ML FEU
EOSINOPHIL # BLD AUTO: 0.01 THOUSAND/ΜL (ref 0–0.61)
EOSINOPHIL NFR BLD AUTO: 0 % (ref 0–6)
ERYTHROCYTE [DISTWIDTH] IN BLOOD BY AUTOMATED COUNT: 18.6 % (ref 11.6–15.1)
FERRITIN SERPL-MCNC: 465 NG/ML (ref 8–388)
GFR SERPL CREATININE-BSD FRML MDRD: 65 ML/MIN/1.73SQ M
GLUCOSE SERPL-MCNC: 168 MG/DL (ref 65–140)
GLUCOSE SERPL-MCNC: 209 MG/DL (ref 65–140)
GLUCOSE SERPL-MCNC: 354 MG/DL (ref 65–140)
GLUCOSE SERPL-MCNC: 368 MG/DL (ref 65–140)
HCT VFR BLD AUTO: 27.6 % (ref 36.5–49.3)
HGB BLD-MCNC: 8.2 G/DL (ref 12–17)
IMM GRANULOCYTES # BLD AUTO: 0.05 THOUSAND/UL (ref 0–0.2)
IMM GRANULOCYTES NFR BLD AUTO: 0 % (ref 0–2)
LYMPHOCYTES # BLD AUTO: 0.93 THOUSANDS/ΜL (ref 0.6–4.47)
LYMPHOCYTES NFR BLD AUTO: 7 % (ref 14–44)
MCH RBC QN AUTO: 23.4 PG (ref 26.8–34.3)
MCHC RBC AUTO-ENTMCNC: 29.7 G/DL (ref 31.4–37.4)
MCV RBC AUTO: 79 FL (ref 82–98)
MONOCYTES # BLD AUTO: 0.85 THOUSAND/ΜL (ref 0.17–1.22)
MONOCYTES NFR BLD AUTO: 6 % (ref 4–12)
NEUTROPHILS # BLD AUTO: 11.62 THOUSANDS/ΜL (ref 1.85–7.62)
NEUTS SEG NFR BLD AUTO: 87 % (ref 43–75)
PLATELET # BLD AUTO: 332 THOUSANDS/UL (ref 149–390)
PMV BLD AUTO: 10.8 FL (ref 8.9–12.7)
POTASSIUM SERPL-SCNC: 3.6 MMOL/L (ref 3.5–5.3)
PROT SERPL-MCNC: 5.7 G/DL (ref 6.4–8.2)
RBC # BLD AUTO: 3.51 MILLION/UL (ref 3.88–5.62)
SODIUM SERPL-SCNC: 141 MMOL/L (ref 136–145)
WBC # BLD AUTO: 13.47 THOUSAND/UL (ref 4.31–10.16)

## 2021-05-10 PROCEDURE — 99232 SBSQ HOSP IP/OBS MODERATE 35: CPT | Performed by: PHYSICIAN ASSISTANT

## 2021-05-10 PROCEDURE — 94760 N-INVAS EAR/PLS OXIMETRY 1: CPT

## 2021-05-10 PROCEDURE — 85025 COMPLETE CBC W/AUTO DIFF WBC: CPT | Performed by: PHYSICIAN ASSISTANT

## 2021-05-10 PROCEDURE — 82728 ASSAY OF FERRITIN: CPT | Performed by: PHYSICIAN ASSISTANT

## 2021-05-10 PROCEDURE — 85379 FIBRIN DEGRADATION QUANT: CPT | Performed by: PHYSICIAN ASSISTANT

## 2021-05-10 PROCEDURE — 82948 REAGENT STRIP/BLOOD GLUCOSE: CPT

## 2021-05-10 PROCEDURE — 99232 SBSQ HOSP IP/OBS MODERATE 35: CPT | Performed by: INTERNAL MEDICINE

## 2021-05-10 PROCEDURE — 80053 COMPREHEN METABOLIC PANEL: CPT | Performed by: PHYSICIAN ASSISTANT

## 2021-05-10 PROCEDURE — 86140 C-REACTIVE PROTEIN: CPT | Performed by: PHYSICIAN ASSISTANT

## 2021-05-10 RX ORDER — DIPHENHYDRAMINE HCL 25 MG
25 TABLET ORAL ONCE
Status: COMPLETED | OUTPATIENT
Start: 2021-05-10 | End: 2021-05-10

## 2021-05-10 RX ADMIN — METHYLPREDNISOLONE SODIUM SUCCINATE 40 MG: 40 INJECTION, POWDER, FOR SOLUTION INTRAMUSCULAR; INTRAVENOUS at 10:18

## 2021-05-10 RX ADMIN — MELATONIN TAB 3 MG 6 MG: 3 TAB at 23:19

## 2021-05-10 RX ADMIN — APIXABAN 2.5 MG: 2.5 TABLET, FILM COATED ORAL at 18:44

## 2021-05-10 RX ADMIN — ALBUTEROL SULFATE 2 PUFF: 90 AEROSOL, METERED RESPIRATORY (INHALATION) at 20:09

## 2021-05-10 RX ADMIN — Medication 250 MG: at 18:44

## 2021-05-10 RX ADMIN — ALBUTEROL SULFATE 2 PUFF: 90 AEROSOL, METERED RESPIRATORY (INHALATION) at 13:16

## 2021-05-10 RX ADMIN — INSULIN LISPRO 16 UNITS: 100 INJECTION, SOLUTION INTRAVENOUS; SUBCUTANEOUS at 18:43

## 2021-05-10 RX ADMIN — Medication 250 MG: at 10:17

## 2021-05-10 RX ADMIN — FUROSEMIDE 40 MG: 40 TABLET ORAL at 18:43

## 2021-05-10 RX ADMIN — FERROUS SULFATE TAB 325 MG (65 MG ELEMENTAL FE) 325 MG: 325 (65 FE) TAB at 10:19

## 2021-05-10 RX ADMIN — Medication 2000 UNITS: at 10:17

## 2021-05-10 RX ADMIN — ALBUTEROL SULFATE 2 PUFF: 90 AEROSOL, METERED RESPIRATORY (INHALATION) at 10:20

## 2021-05-10 RX ADMIN — ASPIRIN 81 MG CHEWABLE TABLET 81 MG: 81 TABLET CHEWABLE at 10:17

## 2021-05-10 RX ADMIN — METOPROLOL SUCCINATE 100 MG: 50 TABLET, EXTENDED RELEASE ORAL at 10:17

## 2021-05-10 RX ADMIN — REMDESIVIR 100 MG: 100 INJECTION, POWDER, LYOPHILIZED, FOR SOLUTION INTRAVENOUS at 18:45

## 2021-05-10 RX ADMIN — ATORVASTATIN CALCIUM 40 MG: 40 TABLET, FILM COATED ORAL at 18:44

## 2021-05-10 RX ADMIN — INSULIN LISPRO 4 UNITS: 100 INJECTION, SOLUTION INTRAVENOUS; SUBCUTANEOUS at 23:21

## 2021-05-10 RX ADMIN — METHYLPREDNISOLONE SODIUM SUCCINATE 40 MG: 40 INJECTION, POWDER, FOR SOLUTION INTRAMUSCULAR; INTRAVENOUS at 20:08

## 2021-05-10 RX ADMIN — INSULIN LISPRO 16 UNITS: 100 INJECTION, SOLUTION INTRAVENOUS; SUBCUTANEOUS at 13:14

## 2021-05-10 RX ADMIN — MULTIPLE VITAMINS W/ MINERALS TAB 1 TABLET: TAB ORAL at 10:17

## 2021-05-10 RX ADMIN — DIPHENHYDRAMINE HYDROCHLORIDE 25 MG: 25 TABLET ORAL at 01:08

## 2021-05-10 RX ADMIN — FUROSEMIDE 40 MG: 40 TABLET ORAL at 10:17

## 2021-05-10 RX ADMIN — APIXABAN 2.5 MG: 2.5 TABLET, FILM COATED ORAL at 10:17

## 2021-05-10 RX ADMIN — PANTOPRAZOLE SODIUM 40 MG: 40 TABLET, DELAYED RELEASE ORAL at 05:17

## 2021-05-10 RX ADMIN — LISINOPRIL 20 MG: 20 TABLET ORAL at 10:16

## 2021-05-10 RX ADMIN — INSULIN LISPRO 4 UNITS: 100 INJECTION, SOLUTION INTRAVENOUS; SUBCUTANEOUS at 10:18

## 2021-05-10 NOTE — PROGRESS NOTES
[place]  Progress Note - Prince Crowley 1945, 77 y o  male MRN: [id number]  Unit/Bed#: -01 Encounter: [id number]  Primary Care Provider: Steven Elizabeth DO   Date and time admitted to hospital: 5/2/2021  2:28 PM    * Pneumonia due to COVID-19 virus  Assessment & Plan  · Patient was diagnosed with COVID on 04/06  · Patient was admitted twice after diagnosis from 4/ 6-4/10 and also from 4/15-4/20   · Patient finished steroids/supportive care  · Came to the hospital with worsening of shortness of breath and chest x-ray was suggestive of worsening of infiltrate  · Given fever possibility of secondary bacterial pneumonia however initial procalcitonin was normal, patient on levofloxacin and vancomycin for healthcare associated pneumonia  · ID consulted on admission  · Continue Levaquin for possible superimposed bacterial infection  · Patient received convalescent plasma 5/7 and re-initiated remdesivir  · Repeat COVID-19 test-5/7 however, could be false negative    · Continue to follow closely with ID  · Pulmonary consulted  · Resume IV steroids given worsening of consolidations on CT scan and worsening respiratory status  · Currently on 8L midflow, pulmonology following - appreciate ongoing recommendations    Chronic diastolic congestive heart failure ([address] )  Assessment & Plan  Wt Readings from Last 3 Encounters:   05/10/21 70 3 kg (154 lb 15 7 oz)   04/20/21 73 2 kg (161 lb 6 oz)   04/10/21 76 8 kg (169 lb 5 oz)     · Diuretics initially held on admission - home regimen includes Lasix 20 mg twice daily patient reports compliance - overall on exam appears euvolemic  · Elevated BNP on admission around 7 K, repeat showed 11 K  · Concern for possible cardiac etiology for shortness of breath/worsening hypoxia  · Obtain echocardiogram - EF 40% appears similar to prior  · Good urine output with lasix 40 mg BID  · Diurese as tolerated, monitor creatinine, if creatinine peaks, will resume home dose diuretics  · Transition back to home diuretics 5/8, but increase dose to 40mg BID  · CTA does suggest pulmonary effusion and congestion    Lymphoma ([facility] )  Assessment & Plan  · Follows with Dr Arthur Garcia with [facility] [phone]  · Considering lung biopsy, patient currently not stable enough to undergo biopsy  · PET scan was done through [facility]- concerning for illuminating areas, question possible reactive inflammatory response versus tumors  Patient will need outpatient follow-up with his oncologist   · Recommending IVIG- ordered 5/6  · May be contributing to persistence of COVID 19 symptoms    Severe sepsis ([facility] )  Assessment & Plan  · Secondary to HCAP?  · Patient met the criteria for sepsis with fever and leukocytosis  · Patient also noted to have lactic acidosis and tachypnea  · Repeat lactic acid normal  · Continue antibiotics  · Blood cultures negative x5 days    Paroxysmal atrial fibrillation ([facility] )  Assessment & Plan  · Patient is on Eliquis as outpatient  · Rate controlled on metoprolol  · Continue with Eliquis    Acute respiratory failure with hypoxia ([facility] )  Assessment & Plan  · Patient with recent diagnosis of COVID and was on 2 L nasal cannula as outpatient, patient reports that he otherwise was doing well  · Concern for possible cardiac etiology - echocardiogram completed, EF 40% appears similar to prior  · Patient got acutely short of breath prior to admission  · Currently on 8L mid flow  · Will trial IV diuresis given elevated BNP, good urine output-  40 milligram IV Lasix b i d   · Transition back to home dose Lasix 20mg BID 5/8  · Also recently had PET scan completed as output, working with [facility] onc- nodules and lymph nodes illuminated, question reactive inflammatory response versus tumor    Patient will need outpatient follow-up with his oncologist   · Continue with the supplemental oxygen and wean off as tolerated    Dyslipidemia  Assessment & Plan  · Continue statin    Type 2 diabetes mellitus without complication, without long-term current use of insulin [facility])  Assessment & Plan  Lab Results   Component Value Date    HGBA1C 5 8 (H) 12/30/2020       Recent Labs     05/09/21  1136 05/09/21  1554 05/09/21  2142 05/10/21  0815   POCGLU 376* 461* 259* 209*       Blood Sugar Average: Last 72 hrs:  (P) 298 8   · Hold oral agents while inpatient  · SSI + accuchek  · Previously discontinued diabetic diet, patient has been unable to tolerate artificial sweeteners  · Will resume regular diet and increase sliding scale insulin regimen  · Started on Lantus 8 units at bedtime 5/9 given persistently elevated sugars  · Patient apparently had itching type of allergic reaction to Lantus and no longer wishes to take it  Was given Benadryl for reaction  Will transition back to diabetic diet with sliding scale only 5/10    Coronary disease  Assessment & Plan  · Status post CABG  · Continue with the aspirin statin and beta-blocker      VTE Pharmacologic Prophylaxis: VTE Score: 5 High Risk (Score >/= 5) - Pharmacological DVT Prophylaxis Ordered: apixaban (Eliquis)  Sequential Compression Devices Ordered  Patient Centered Rounds: I performed bedside rounds with nursing staff today  Discussions with Specialists or Other Care Team Provider: Appreciate input from pulmonary team      Education and Discussions with Family / Patient: Updated  (wife) via phone  Time Spent for Care: 30 minutes  More than 50% of total time spent on counseling and coordination of care as described above  Current Length of Stay: 8 day(s)  Current Patient Status: Inpatient   Certification Statement: The patient will continue to require additional inpatient hospital stay due to Increased O2 requirement  IV Remdesivir  Discharge Plan: Anticipate discharge in >72 hrs to home      Code Status: Level 1 - Full Code    Subjective:   Patient still reports poor sleep but does believe his breathing has improved and is encouraged that he is currently requiring less oxygen  Objective:     Vitals:   Temp (24hrs), Av 3 °F (36 3 °C), Min:97 °F (36 1 °C), Max:97 4 °F (36 3 °C)    Temp:  [97 °F (36 1 °C)-97 4 °F (36 3 °C)] 97 4 °F (36 3 °C)  HR:  [72-82] 82  Resp:  [17-23] 18  BP: (111-128)/(55-60) 118/57  SpO2:  [90 %-100 %] 90 %  Body mass index is 21 62 kg/m²  Input and Output Summary (last 24 hours): Intake/Output Summary (Last 24 hours) at 5/10/2021 1041  Last data filed at 5/10/2021 0930  Gross per 24 hour   Intake 1281 ml   Output 1200 ml   Net 81 ml       Physical Exam:   Physical Exam  Vitals signs and nursing note reviewed  Constitutional:       General: He is not in acute distress  Appearance: Normal appearance  He is well-developed  Interventions: Nasal cannula in place  HENT:      Head: Normocephalic and atraumatic  Eyes:      General: No scleral icterus  Conjunctiva/sclera: Conjunctivae normal    Cardiovascular:      Rate and Rhythm: Normal rate and regular rhythm  Heart sounds: No murmur  Pulmonary:      Effort: Pulmonary effort is normal       Breath sounds: No wheezing, rhonchi or rales  Abdominal:      General: There is no distension  Palpations: Abdomen is soft  Skin:     General: Skin is warm and dry  Neurological:      General: No focal deficit present  Mental Status: He is alert     Psychiatric:         Mood and Affect: Mood normal           Additional Data:     Labs:  Results from last 7 days   Lab Units 05/10/21  0531   WBC Thousand/uL 13 47*   HEMOGLOBIN g/dL 8 2*   HEMATOCRIT % 27 6*   PLATELETS Thousands/uL 332   NEUTROS PCT % 87*   LYMPHS PCT % 7*   MONOS PCT % 6   EOS PCT % 0     Results from last 7 days   Lab Units 05/10/21  0531   SODIUM mmol/L 141   POTASSIUM mmol/L 3 6   CHLORIDE mmol/L 100   CO2 mmol/L 34*   BUN mg/dL 31*   CREATININE mg/dL 1 11   ANION GAP mmol/L 7   CALCIUM mg/dL 8 0*   ALBUMIN g/dL 2 1*   TOTAL BILIRUBIN mg/dL 0  70   ALK PHOS U/L 124*   ALT U/L 42   AST U/L 28   GLUCOSE RANDOM mg/dL 168*         Results from last 7 days   Lab Units 05/10/21  0815 05/09/21  2142 05/09/21  1554 05/09/21  1136 05/09/21  0755 05/09/21  0337 05/08/21  2326 05/08/21  2100 05/08/21  1723 05/08/21  1100 05/08/21  0842 05/07/21  2057   POC GLUCOSE mg/dl 209* 259* 461* 376* 261* 270* 345* 398* 416* 361* 185* 333*               Lines/Drains:  Invasive Devices     Peripheral Intravenous Line            Peripheral IV 05/10/21 Left;Ventral (anterior) Forearm less than 1 day                  Telemetry:  Telemetry Orders (From admission, onward)             48 Hour Telemetry Monitoring  Continuous x 48 hours     Question:  Reason for 48 Hour Telemetry  Answer:  Arrhythmias Requiring Medical Therapy (eg  SVT, Vtach/fib, Bradycardia, Uncontrolled A-fib)                 Telemetry Reviewed: Normal Sinus Rhythm  Indication for Continued Telemetry Use: No indication for continued use  Will discontinue  Imaging: No pertinent imaging reviewed      Recent Cultures (last 7 days):         Last 24 Hours Medication List:   Current Facility-Administered Medications   Medication Dose Route Frequency Provider Last Rate    acetaminophen  650 mg Oral Q6H PRN dakishan Link, PA-C      albuterol  2 puff Inhalation TID dalen Link, PA-C      apixaban  2 5 mg Oral BID Magdalen Link, PA-C      aspirin  81 mg Oral Daily dalen Link, PA-C      atorvastatin  40 mg Oral Daily With Darrall Nian, PA-C      cholecalciferol  2,000 Units Oral Daily Magdalen Link, PA-C      ferrous sulfate  325 mg Oral Daily With Breakfast dalen Link, PA-C      furosemide  40 mg Oral BID (diuretic) dakishan Link PA-C      insulin lispro  2-12 Units Subcutaneous HS Magdalen Link, PA-C      insulin lispro  4-20 Units Subcutaneous TID AC Magdalen Link, PA-C      lisinopril  20 mg Oral Daily Magdalen Link, PA-C      melatonin 6 mg Oral HS Jorge June PA-C      methylPREDNISolone sodium succinate  40 mg Intravenous Q12H [facility] Jorge June PA-C      metoprolol succinate  100 mg Oral Daily Jorge June PA-C      multivitamin-minerals  1 tablet Oral Daily Filemon Ferro      nitroglycerin  0 4 mg Sublingual Q5 Min PRN Jorge June PA-C      ondansetron  4 mg Intravenous Q6H PRN Jorge June PA-C      pantoprazole  40 mg Oral Early Morning Jorge June PA-C      remdesivir  100 mg Intravenous Q24H Jorge June PA-C Stopped (05/09/21 0526)   Jesús Pineda saccharomyces boulardii  250 mg Oral BID Jorge June PA-C      simethicone  80 mg Oral Q6H PRN Jorge June PA-C          Today, Patient Was Seen By: Kel Shaver PA-C    **Please Note: This note may have been constructed using a voice recognition system  **

## 2021-05-10 NOTE — ASSESSMENT & PLAN NOTE
Lab Results   Component Value Date    HGBA1C 5 8 (H) 12/30/2020       Recent Labs     05/09/21  1136 05/09/21  1554 05/09/21  2142 05/10/21  0815   POCGLU 376* 461* 259* 209*       Blood Sugar Average: Last 72 hrs:  (P) 298 8   · Hold oral agents while inpatient  · SSI + accuchek  · Previously discontinued diabetic diet, patient has been unable to tolerate artificial sweeteners  · Will resume regular diet and increase sliding scale insulin regimen  · Started on Lantus 8 units at bedtime 5/9 given persistently elevated sugars  · Patient apparently had itching type of allergic reaction to Lantus and no longer wishes to take it  Was given Benadryl for reaction    Was transitioned back to diabetic diet with sliding scale on 5/10; will add humalog 5 units TID with meals standing dose

## 2021-05-10 NOTE — ASSESSMENT & PLAN NOTE
· Patient with recent diagnosis of COVID and was on 2 L nasal cannula as outpatient, patient reports that he otherwise was doing well  · Concern for possible cardiac etiology - echocardiogram completed, EF 40% appears similar to prior  · Patient got acutely short of breath prior to admission  · Currently on 6L mid flow  · Will trial IV diuresis given elevated BNP, good urine output-  40 milligram IV Lasix b i d   · Transitioned back to home dose Lasix 20mg BID 5/8  · Also recently had PET scan completed as output, working with [facility] onc- nodules and lymph nodes illuminated, question reactive inflammatory response versus tumor    Patient will need outpatient follow-up with his oncologist   · Continue with the supplemental oxygen and wean off as tolerated

## 2021-05-10 NOTE — ASSESSMENT & PLAN NOTE
· Patient was diagnosed with COVID on 04/06  · Patient was admitted twice after diagnosis from 4/ 6-4/10 and also from 4/15-4/20   · Patient finished steroids/supportive care  · Came to the hospital with worsening of shortness of breath and chest x-ray was suggestive of worsening of infiltrate  · Given fever possibility of secondary bacterial pneumonia however initial procalcitonin was normal, patient completed abx  · ID consulted on admission  · Patient completed antibiotics for possible superimposed bacterial infection  · Patient received convalescent plasma 5/7 and re-initiated remdesivir, dose ,#3 today  · Repeat COVID-19 test-5/7 however, could be false negative    · Continue to follow closely with ID  · Pulmonary consulted  · Resume IV steroids given worsening of consolidations on CT scan and worsening respiratory status  · Currently on 6L midflow, pulmonology following - appreciate ongoing recommendations

## 2021-05-10 NOTE — ASSESSMENT & PLAN NOTE
· Patient was diagnosed with COVID on 04/06  · Patient was admitted twice after diagnosis from 4/ 6-4/10 and also from 4/15-4/20   · Patient finished steroids/supportive care  · Came to the hospital with worsening of shortness of breath and chest x-ray was suggestive of worsening of infiltrate  · Given fever possibility of secondary bacterial pneumonia however initial procalcitonin was normal, patient on levofloxacin and vancomycin for healthcare associated pneumonia  · ID consulted on admission  · Continue Levaquin for possible superimposed bacterial infection  · Patient received convalescent plasma 5/7 and re-initiated remdesivir  · Repeat COVID-19 test-5/7 however, could be false negative    · Continue to follow closely with ID  · Pulmonary consulted  · Resume IV steroids given worsening of consolidations on CT scan and worsening respiratory status  · Currently on 8L midflow, pulmonology following - appreciate ongoing recommendations

## 2021-05-10 NOTE — ASSESSMENT & PLAN NOTE
· Patient with recent diagnosis of COVID and was on 2 L nasal cannula as outpatient, patient reports that he otherwise was doing well  · Concern for possible cardiac etiology - echocardiogram completed, EF 40% appears similar to prior  · Patient got acutely short of breath prior to admission  · Currently on 8L mid flow  · Will trial IV diuresis given elevated BNP, good urine output-  40 milligram IV Lasix b i d   · Transition back to home dose Lasix 20mg BID 5/8  · Also recently had PET scan completed as output, working with [facility] onc- nodules and lymph nodes illuminated, question reactive inflammatory response versus tumor    Patient will need outpatient follow-up with his oncologist   · Continue with the supplemental oxygen and wean off as tolerated

## 2021-05-10 NOTE — ASSESSMENT & PLAN NOTE
· Follows with Dr Carmita Baez with [facility]  Lisa Castillo [phone]  · Considering lung biopsy, patient currently not stable enough to undergo biopsy  · PET scan was done through [facility]- concerning for illuminating areas, question possible reactive inflammatory response versus tumors    Patient will need outpatient follow-up with his oncologist   · Recommending IVIG- ordered 5/6  · May be contributing to persistence of COVID 19 symptoms Detail Level: Detailed Include Pregnancy/Lactation Warning?: No Azithromycin Counseling:  I discussed with the patient the risks of azithromycin including but not limited to GI upset, allergic reaction, drug rash, diarrhea, and yeast infections. Azithromycin Pregnancy And Lactation Text: This medication is considered safe during pregnancy and is also secreted in breast milk. Bactrim Counseling:  I discussed with the patient the risks of sulfa antibiotics including but not limited to GI upset, allergic reaction, drug rash, diarrhea, dizziness, photosensitivity, and yeast infections.  Rarely, more serious reactions can occur including but not limited to aplastic anemia, agranulocytosis, methemoglobinemia, blood dyscrasias, liver or kidney failure, lung infiltrates or desquamative/blistering drug rashes. Bactrim Pregnancy And Lactation Text: This medication is Pregnancy Category D and is known to cause fetal risk.  It is also excreted in breast milk. Birth Control Pills Counseling: Birth Control Pill Counseling: I discussed with the patient the potential side effects of OCPs including but not limited to increased risk of stroke, heart attack, thrombophlebitis, deep venous thrombosis, hepatic adenomas, breast changes, GI upset, headaches, and depression.  The patient verbalized understanding of the proper use and possible adverse effects of OCPs. All of the patient's questions and concerns were addressed. Birth Control Pills Pregnancy And Lactation Text: This medication should be avoided if pregnant and for the first 30 days post-partum. Dapsone Counseling: I discussed with the patient the risks of dapsone including but not limited to hemolytic anemia, agranulocytosis, rashes, methemoglobinemia, kidney failure, peripheral neuropathy, headaches, GI upset, and liver toxicity.  Patients who start dapsone require monitoring including baseline LFTs and weekly CBCs for the first month, then every month thereafter.  The patient verbalized understanding of the proper use and possible adverse effects of dapsone.  All of the patient's questions and concerns were addressed. Dapsone Pregnancy And Lactation Text: This medication is Pregnancy Category C and is not considered safe during pregnancy or breast feeding. Doxycycline Counseling:  Patient counseled regarding possible photosensitivity and increased risk for sunburn.  Patient instructed to avoid sunlight, if possible.  When exposed to sunlight, patients should wear protective clothing, sunglasses, and sunscreen.  The patient was instructed to call the office immediately if the following severe adverse effects occur:  hearing changes, easy bruising/bleeding, severe headache, or vision changes.  The patient verbalized understanding of the proper use and possible adverse effects of doxycycline.  All of the patient's questions and concerns were addressed. Doxycycline Pregnancy And Lactation Text: This medication is Pregnancy Category D and not consider safe during pregnancy. It is also excreted in breast milk but is considered safe for shorter treatment courses. Erythromycin Counseling:  I discussed with the patient the risks of erythromycin including but not limited to GI upset, allergic reaction, drug rash, diarrhea, increase in liver enzymes, and yeast infections. Erythromycin Pregnancy And Lactation Text: This medication is Pregnancy Category B and is considered safe during pregnancy. It is also excreted in breast milk. Isotretinoin Counseling: Patient should get monthly blood tests, not donate blood, not drive at night if vision affected, not share medication, and not undergo elective surgery for 6 months after tx completed. Side effects reviewed, pt to contact office should one occur. Isotretinoin Pregnancy And Lactation Text: This medication is Pregnancy Category X and is considered extremely dangerous during pregnancy. It is unknown if it is excreted in breast milk. High Dose Vitamin A Counseling: Side effects reviewed, pt to contact office should one occur. High Dose Vitamin A Pregnancy And Lactation Text: High dose vitamin A therapy is contraindicated during pregnancy and breast feeding. Minocycline Counseling: Patient advised regarding possible photosensitivity and discoloration of the teeth, skin, lips, tongue and gums.  Patient instructed to avoid sunlight, if possible.  When exposed to sunlight, patients should wear protective clothing, sunglasses, and sunscreen.  The patient was instructed to call the office immediately if the following severe adverse effects occur:  hearing changes, easy bruising/bleeding, severe headache, or vision changes.  The patient verbalized understanding of the proper use and possible adverse effects of minocycline.  All of the patient's questions and concerns were addressed. Minocycline Pregnancy And Lactation Text: This medication is Pregnancy Category D and not consider safe during pregnancy. It is also excreted in breast milk. Sarecycline Counseling: Patient advised regarding possible photosensitivity and discoloration of the teeth, skin, lips, tongue and gums.  Patient instructed to avoid sunlight, if possible.  When exposed to sunlight, patients should wear protective clothing, sunglasses, and sunscreen.  The patient was instructed to call the office immediately if the following severe adverse effects occur:  hearing changes, easy bruising/bleeding, severe headache, or vision changes.  The patient verbalized understanding of the proper use and possible adverse effects of sarecycline.  All of the patient's questions and concerns were addressed. Spironolactone Counseling: Patient advised regarding risks of diarrhea, abdominal pain, hyperkalemia, birth defects (for female patients), liver toxicity and renal toxicity. The patient may need blood work to monitor liver and kidney function and potassium levels while on therapy. The patient verbalized understanding of the proper use and possible adverse effects of spironolactone.  All of the patient's questions and concerns were addressed. Spironolactone Pregnancy And Lactation Text: This medication can cause feminization of the male fetus and should be avoided during pregnancy. The active metabolite is also found in breast milk. Tetracycline Counseling: Patient counseled regarding possible photosensitivity and increased risk for sunburn.  Patient instructed to avoid sunlight, if possible.  When exposed to sunlight, patients should wear protective clothing, sunglasses, and sunscreen.  The patient was instructed to call the office immediately if the following severe adverse effects occur:  hearing changes, easy bruising/bleeding, severe headache, or vision changes.  The patient verbalized understanding of the proper use and possible adverse effects of tetracycline.  All of the patient's questions and concerns were addressed. Patient understands to avoid pregnancy while on therapy due to potential birth defects. Benzoyl Peroxide Counseling: Patient counseled that medicine may cause skin irritation and bleach clothing.  In the event of skin irritation, the patient was advised to reduce the amount of the drug applied or use it less frequently.   The patient verbalized understanding of the proper use and possible adverse effects of benzoyl peroxide.  All of the patient's questions and concerns were addressed. Benzoyl Peroxide Pregnancy And Lactation Text: This medication is Pregnancy Category C. It is unknown if benzoyl peroxide is excreted in breast milk. Topical Retinoid counseling:  Patient advised to apply a pea-sized amount only at bedtime and wait 30 minutes after washing their face before applying.  If too drying, patient may add a non-comedogenic moisturizer. The patient verbalized understanding of the proper use and possible adverse effects of retinoids.  All of the patient's questions and concerns were addressed. Topical Retinoid Pregnancy And Lactation Text: This medication is Pregnancy Category C. It is unknown if this medication is excreted in breast milk. Tazorac Counseling:  Patient advised that medication is irritating and drying.  Patient may need to apply sparingly and wash off after an hour before eventually leaving it on overnight.  The patient verbalized understanding of the proper use and possible adverse effects of tazorac.  All of the patient's questions and concerns were addressed. Tazorac Pregnancy And Lactation Text: This medication is not safe during pregnancy. It is unknown if this medication is excreted in breast milk. Topical Clindamycin Counseling: Patient counseled that this medication may cause skin irritation or allergic reactions.  In the event of skin irritation, the patient was advised to reduce the amount of the drug applied or use it less frequently.   The patient verbalized understanding of the proper use and possible adverse effects of clindamycin.  All of the patient's questions and concerns were addressed. Topical Clindamycin Pregnancy And Lactation Text: This medication is Pregnancy Category B and is considered safe during pregnancy. It is unknown if it is excreted in breast milk. Topical Sulfur Applications Counseling: Topical Sulfur Counseling: Patient counseled that this medication may cause skin irritation or allergic reactions.  In the event of skin irritation, the patient was advised to reduce the amount of the drug applied or use it less frequently.   The patient verbalized understanding of the proper use and possible adverse effects of topical sulfur application.  All of the patient's questions and concerns were addressed. Topical Sulfur Applications Pregnancy And Lactation Text: This medication is Pregnancy Category C and has an unknown safety profile during pregnancy. It is unknown if this topical medication is excreted in breast milk.

## 2021-05-10 NOTE — ASSESSMENT & PLAN NOTE
· Secondary to HCAP?  · Patient met the criteria for sepsis with fever and leukocytosis  · Patient also noted to have lactic acidosis and tachypnea  · Repeat lactic acid normal  · Completed antibiotics  · Blood cultures negative x5 days

## 2021-05-10 NOTE — ASSESSMENT & PLAN NOTE
· Follows with Dr Jaymie Zaldivar with [address] [place] [phone]  · Considering lung biopsy, patient currently not stable enough to undergo biopsy  · PET scan was done through [facility]- concerning for illuminating areas, question possible reactive inflammatory response versus tumors    Patient will need outpatient follow-up with his oncologist   · Recommending IVIG- ordered 5/6  · May be contributing to persistence of COVID 19 symptoms

## 2021-05-10 NOTE — CASE MANAGEMENT
Continuing to follow patient  Attempted to speak with patient via phone at [phone] and there was no answer  Placed call to Janay, his wife at [phone]  She confirmed patient has Home O2 from [employer] after his last [facility] admission in mid April  She plans on having patient return home with her at discharge with [facility] following  She will transport at discharge

## 2021-05-10 NOTE — PROGRESS NOTES
Report called to Karen Aguilar 87 RN, Brenna Evans, questions answered  Pt leaving with all belongings

## 2021-05-10 NOTE — ASSESSMENT & PLAN NOTE
Wt Readings from Last 3 Encounters:   05/10/21 70 3 kg (154 lb 15 7 oz)   04/20/21 73 2 kg (161 lb 6 oz)   04/10/21 76 8 kg (169 lb 5 oz)     · Diuretics initially held on admission - home regimen includes Lasix 20 mg twice daily patient reports compliance - overall on exam appears euvolemic  · Elevated BNP on admission around 7 K, repeat showed 11 K  · Concern for possible cardiac etiology for shortness of breath/worsening hypoxia  · Obtain echocardiogram - EF 40% appears similar to prior  · Good urine output with lasix 40 mg BID  · Diurese as tolerated, monitor creatinine, if creatinine peaks, will resume home dose diuretics    · Transition back to home diuretics 5/8, but increase dose to 40mg BID  · CTA does suggest pulmonary effusion and congestion

## 2021-05-10 NOTE — ASSESSMENT & PLAN NOTE
Lab Results   Component Value Date    HGBA1C 5 8 (H) 12/30/2020       Recent Labs     05/09/21  1136 05/09/21  1554 05/09/21  2142 05/10/21  0815   POCGLU 376* 461* 259* 209*       Blood Sugar Average: Last 72 hrs:  (P) 298 8   · Hold oral agents while inpatient  · SSI + accuchek  · Discontinued Diabetic diet, patient has been unable to tolerate artificial sweeteners  · Will resume regular diet and increase sliding scale insulin regimen  · Started on Lantus 8 units at bedtime 5/9 given persistently elevated sugars

## 2021-05-11 LAB
ALBUMIN SERPL BCP-MCNC: 2 G/DL (ref 3.5–5)
ALP SERPL-CCNC: 120 U/L (ref 46–116)
ALT SERPL W P-5'-P-CCNC: 38 U/L (ref 12–78)
ANION GAP SERPL CALCULATED.3IONS-SCNC: 4 MMOL/L (ref 4–13)
AST SERPL W P-5'-P-CCNC: 23 U/L (ref 5–45)
BASOPHILS # BLD AUTO: 0.02 THOUSANDS/ΜL (ref 0–0.1)
BASOPHILS NFR BLD AUTO: 0 % (ref 0–1)
BILIRUB SERPL-MCNC: 0.8 MG/DL (ref 0.2–1)
BUN SERPL-MCNC: 30 MG/DL (ref 5–25)
CALCIUM ALBUM COR SERPL-MCNC: 9.5 MG/DL (ref 8.3–10.1)
CALCIUM SERPL-MCNC: 7.9 MG/DL (ref 8.3–10.1)
CHLORIDE SERPL-SCNC: 101 MMOL/L (ref 100–108)
CO2 SERPL-SCNC: 37 MMOL/L (ref 21–32)
CREAT SERPL-MCNC: 1.07 MG/DL (ref 0.6–1.3)
EOSINOPHIL # BLD AUTO: 0 THOUSAND/ΜL (ref 0–0.61)
EOSINOPHIL NFR BLD AUTO: 0 % (ref 0–6)
ERYTHROCYTE [DISTWIDTH] IN BLOOD BY AUTOMATED COUNT: 18.6 % (ref 11.6–15.1)
GFR SERPL CREATININE-BSD FRML MDRD: 68 ML/MIN/1.73SQ M
GLUCOSE SERPL-MCNC: 164 MG/DL (ref 65–140)
GLUCOSE SERPL-MCNC: 175 MG/DL (ref 65–140)
GLUCOSE SERPL-MCNC: 237 MG/DL (ref 65–140)
GLUCOSE SERPL-MCNC: 314 MG/DL (ref 65–140)
GLUCOSE SERPL-MCNC: 327 MG/DL (ref 65–140)
HCT VFR BLD AUTO: 27.3 % (ref 36.5–49.3)
HGB BLD-MCNC: 8.1 G/DL (ref 12–17)
IMM GRANULOCYTES # BLD AUTO: 0.23 THOUSAND/UL (ref 0–0.2)
IMM GRANULOCYTES NFR BLD AUTO: 2 % (ref 0–2)
LYMPHOCYTES # BLD AUTO: 1.44 THOUSANDS/ΜL (ref 0.6–4.47)
LYMPHOCYTES NFR BLD AUTO: 10 % (ref 14–44)
MCH RBC QN AUTO: 23.8 PG (ref 26.8–34.3)
MCHC RBC AUTO-ENTMCNC: 29.7 G/DL (ref 31.4–37.4)
MCV RBC AUTO: 80 FL (ref 82–98)
MONOCYTES # BLD AUTO: 1.04 THOUSAND/ΜL (ref 0.17–1.22)
MONOCYTES NFR BLD AUTO: 7 % (ref 4–12)
NEUTROPHILS # BLD AUTO: 12 THOUSANDS/ΜL (ref 1.85–7.62)
NEUTS SEG NFR BLD AUTO: 81 % (ref 43–75)
NRBC BLD AUTO-RTO: 0 /100 WBCS
PLATELET # BLD AUTO: 191 THOUSANDS/UL (ref 149–390)
PMV BLD AUTO: 12 FL (ref 8.9–12.7)
POTASSIUM SERPL-SCNC: 3.6 MMOL/L (ref 3.5–5.3)
PROT SERPL-MCNC: 5.8 G/DL (ref 6.4–8.2)
RBC # BLD AUTO: 3.41 MILLION/UL (ref 3.88–5.62)
SODIUM SERPL-SCNC: 142 MMOL/L (ref 136–145)
WBC # BLD AUTO: 14.73 THOUSAND/UL (ref 4.31–10.16)

## 2021-05-11 PROCEDURE — 82948 REAGENT STRIP/BLOOD GLUCOSE: CPT

## 2021-05-11 PROCEDURE — 99232 SBSQ HOSP IP/OBS MODERATE 35: CPT | Performed by: INTERNAL MEDICINE

## 2021-05-11 PROCEDURE — 80053 COMPREHEN METABOLIC PANEL: CPT | Performed by: PHYSICIAN ASSISTANT

## 2021-05-11 PROCEDURE — 99232 SBSQ HOSP IP/OBS MODERATE 35: CPT | Performed by: PHYSICIAN ASSISTANT

## 2021-05-11 PROCEDURE — 94760 N-INVAS EAR/PLS OXIMETRY 1: CPT

## 2021-05-11 PROCEDURE — 85025 COMPLETE CBC W/AUTO DIFF WBC: CPT | Performed by: PHYSICIAN ASSISTANT

## 2021-05-11 RX ORDER — METHYLPREDNISOLONE SODIUM SUCCINATE 40 MG/ML
30 INJECTION, POWDER, LYOPHILIZED, FOR SOLUTION INTRAMUSCULAR; INTRAVENOUS EVERY 12 HOURS SCHEDULED
Status: DISCONTINUED | OUTPATIENT
Start: 2021-05-11 | End: 2021-05-12

## 2021-05-11 RX ADMIN — METHYLPREDNISOLONE SODIUM SUCCINATE 30 MG: 40 INJECTION, POWDER, FOR SOLUTION INTRAMUSCULAR; INTRAVENOUS at 21:47

## 2021-05-11 RX ADMIN — FUROSEMIDE 40 MG: 40 TABLET ORAL at 08:19

## 2021-05-11 RX ADMIN — Medication 2000 UNITS: at 08:19

## 2021-05-11 RX ADMIN — INSULIN LISPRO 12 UNITS: 100 INJECTION, SOLUTION INTRAVENOUS; SUBCUTANEOUS at 17:57

## 2021-05-11 RX ADMIN — ALBUTEROL SULFATE 2 PUFF: 90 AEROSOL, METERED RESPIRATORY (INHALATION) at 21:49

## 2021-05-11 RX ADMIN — MULTIPLE VITAMINS W/ MINERALS TAB 1 TABLET: TAB ORAL at 08:19

## 2021-05-11 RX ADMIN — INSULIN LISPRO 5 UNITS: 100 INJECTION, SOLUTION INTRAVENOUS; SUBCUTANEOUS at 08:20

## 2021-05-11 RX ADMIN — ASPIRIN 81 MG CHEWABLE TABLET 81 MG: 81 TABLET CHEWABLE at 08:19

## 2021-05-11 RX ADMIN — PANTOPRAZOLE SODIUM 40 MG: 40 TABLET, DELAYED RELEASE ORAL at 06:46

## 2021-05-11 RX ADMIN — ALBUTEROL SULFATE 2 PUFF: 90 AEROSOL, METERED RESPIRATORY (INHALATION) at 08:19

## 2021-05-11 RX ADMIN — Medication 250 MG: at 17:54

## 2021-05-11 RX ADMIN — METOPROLOL SUCCINATE 100 MG: 50 TABLET, EXTENDED RELEASE ORAL at 08:19

## 2021-05-11 RX ADMIN — INSULIN LISPRO 5 UNITS: 100 INJECTION, SOLUTION INTRAVENOUS; SUBCUTANEOUS at 12:49

## 2021-05-11 RX ADMIN — FERROUS SULFATE TAB 325 MG (65 MG ELEMENTAL FE) 325 MG: 325 (65 FE) TAB at 08:19

## 2021-05-11 RX ADMIN — INSULIN LISPRO 4 UNITS: 100 INJECTION, SOLUTION INTRAVENOUS; SUBCUTANEOUS at 08:19

## 2021-05-11 RX ADMIN — APIXABAN 2.5 MG: 2.5 TABLET, FILM COATED ORAL at 17:54

## 2021-05-11 RX ADMIN — INSULIN LISPRO 12 UNITS: 100 INJECTION, SOLUTION INTRAVENOUS; SUBCUTANEOUS at 12:48

## 2021-05-11 RX ADMIN — LISINOPRIL 20 MG: 20 TABLET ORAL at 08:19

## 2021-05-11 RX ADMIN — APIXABAN 2.5 MG: 2.5 TABLET, FILM COATED ORAL at 08:19

## 2021-05-11 RX ADMIN — INSULIN LISPRO 2 UNITS: 100 INJECTION, SOLUTION INTRAVENOUS; SUBCUTANEOUS at 21:49

## 2021-05-11 RX ADMIN — ATORVASTATIN CALCIUM 40 MG: 40 TABLET, FILM COATED ORAL at 17:54

## 2021-05-11 RX ADMIN — METHYLPREDNISOLONE SODIUM SUCCINATE 40 MG: 40 INJECTION, POWDER, FOR SOLUTION INTRAMUSCULAR; INTRAVENOUS at 08:19

## 2021-05-11 RX ADMIN — MELATONIN TAB 3 MG 6 MG: 3 TAB at 21:47

## 2021-05-11 RX ADMIN — INSULIN LISPRO 5 UNITS: 100 INJECTION, SOLUTION INTRAVENOUS; SUBCUTANEOUS at 17:58

## 2021-05-11 RX ADMIN — FUROSEMIDE 40 MG: 40 TABLET ORAL at 17:54

## 2021-05-11 RX ADMIN — ALBUTEROL SULFATE 2 PUFF: 90 AEROSOL, METERED RESPIRATORY (INHALATION) at 14:37

## 2021-05-11 RX ADMIN — Medication 250 MG: at 08:19

## 2021-05-11 RX ADMIN — REMDESIVIR 100 MG: 100 INJECTION, POWDER, LYOPHILIZED, FOR SOLUTION INTRAVENOUS at 18:01

## 2021-05-11 NOTE — PROGRESS NOTES
Madi Damon  77 y o   male  1945  mrn [id number]    Assessment/Plan:  1  Sepsis/Pneumonia/Presumed ongoing COVID19 infxn with pneumonia/Fever/  Leukocytosis/Hypoxia: Fevers have resolved but WBC has increased slightly to 13K - most likely due to steroid tx  He was transferred to [address] [place] [address] on 5/8  His respiratory status has improved following initiation of tx for presumed ongoing COVID19 infxn  He is currently receiving tx with steroids and Remdesivir  COVID19 Ab was neg so Pt received convalescent plasma  Recent COVID19 test was neg but could be false neg  CRP has decreased to 54 8  His supplemental O2 has decreased to 6L by [address]  Pt completed 7 day course of Levaquin on 5/8 for possible pneumonia    Pt's supplemental O2 requirement increased to 15L by [address] on 5/7  Chest CT showed increased pulmonary infilts but no PE  Solumedrol and Remdesivir were started on 5/7  COVID19 IgG Ab was neg which indicated that the Pt did not develop immunity to recent COVID19 infxn due to his underlying lymphoma  Concerned that respiratory deterioration is due to ongoing COVID19 infxn  Pts with lymphoma are known to clear the virus more slowly  Repeat CRP had increased to 160 1 and  Ferritin was 348  Pt received tx with IVIG on 5/6 as directed by his Oncologist which probably has some COVID19 Ab's     BNP was markedly elevated so there was probably a component of vol overload that was contributing to his current pulmonary sx's  He is being diuresed  Repeat BNP has decreased to 6501  Repeat CXR did not show any signficant improvement with mild increase in RUL infilt      On admission, Pt had fever and leukocytosis, so need to be concerned with presence of pneumonia  Procalcitonin level was not markedly elevated but lactic acid was increased suggestive of sepsis  Urine Legionella Ag and Strep pneumo Ag were neg  Bld cx's are neg  MRSA screen   Inflammatory markers wer still elevated from his recent COVID19 infxn which was tx'd with Remdesivir and decadron during his previous admission from 4/6 to 4/10/21  He was re-admitted from 4/15 to 4/20 with increasing SOB and was tx'd with steroids       Pt presented with acute onset of increasing SOB and cough x 1 day       A  No abx tx is needed at this time  B  Cont Remdesivir and steroid tx since COVID19 Ab test was neg and chest CT showed increasing pulmonary infilts with worsening hypoxia - Pts with underlying lymphoma are known to take longer to clear the virus - s/p tx with convalescent plasma  D  Cont supplemental O2 as needed  E  Cont diuresis as needed  F  Cont enhanced respiratory isolation for now  G  Will follow inflammatory markers and LFT's    Subjective: Breathing is better today - transferred out of SD 2  Currently on 6L by [address] [facility]    Objective:  Tmax: 97 4  Lungs: +Few crackles at the bases  Abd: +BS, soft, nontender  Ext: No calf tenderness    Labs:  CBC w/diff  Recent Labs     05/10/21  0531   WBC 13 47*   HGB 8 2*   HCT 27 6*      NEUTOPHILPCT 87*   LYMPHOPCT 7*   MONOPCT 6   EOSPCT 0     BMP  Recent Labs     05/10/21  0531   K 3 6      CO2 34*   BUN 31*   CREATININE 1 11   CALCIUM 8 0*     CMP  Recent Labs     05/10/21  0531   K 3 6      CO2 34*   BUN 31*   CREATININE 1 11   CALCIUM 8 0*   ALKPHOS 124*   ALT 42   AST 28        labrc    Cultures:  Lab Results   Component Value Date    BLOODCX No Growth After 5 Days  05/02/2021    BLOODCX No Growth After 5 Days  05/02/2021    BLOODCX No Growth After 5 Days  04/15/2021    BLOODCX No Growth After 5 Days  04/15/2021    BLOODCX No Growth After 5 Days  04/06/2021    BLOODCX No Growth After 5 Days  04/06/2021    BLOODCX No Growth After 5 Days  04/01/2021    BLOODCX No Growth After 5 Days  04/01/2021    BLOODCX No Growth After 5 Days  02/27/2019    BLOODCX No Growth After 5 Days  02/27/2019    BLOODCX No Growth After 5 Days  11/25/2018    BLOODCX No Growth After 5 Days   11/25/2018     No results found for: WOUNDCULT  No results found for: Umana Flank  No results found for: SPUTUMCULTUR    MED:  Remdesivir #4          Completed:  Vanco x 4 days on 5/5   Levaquin x 7 days on 5/8      Current Facility-Administered Medications:     acetaminophen (TYLENOL) tablet 650 mg, 650 mg, Oral, Q6H PRN, Kaleigh Fisher PA-C    albuterol (PROVENTIL HFA,VENTOLIN HFA) inhaler 2 puff, 2 puff, Inhalation, TID, Kaleigh Fisher PA-C, 2 puff at 05/10/21 2009    apixaban (ELIQUIS) tablet 2 5 mg, 2 5 mg, Oral, BID, MAXIMO WhiteC, 2 5 mg at 05/10/21 1844    aspirin chewable tablet 81 mg, 81 mg, Oral, Daily, MORGAN White-C, 81 mg at 05/10/21 1017    atorvastatin (LIPITOR) tablet 40 mg, 40 mg, Oral, Daily With Mima Buffy, PA-C, 40 mg at 05/10/21 1844    cholecalciferol (VITAMIN D3) tablet 2,000 Units, 2,000 Units, Oral, Daily, Kaleigh Fisher PA-C, 2,000 Units at 05/10/21 1017    ferrous sulfate tablet 325 mg, 325 mg, Oral, Daily With Breakfast, Kaleigh Fisher PA-C, 325 mg at 05/10/21 1019    furosemide (LASIX) tablet 40 mg, 40 mg, Oral, BID (diuretic), Kaleigh Fisher PA-C, 40 mg at 05/10/21 1843    insulin lispro (HumaLOG) 100 units/mL subcutaneous injection 2-12 Units, 2-12 Units, Subcutaneous, HS, MORGAN White-C, 6 Units at 05/09/21 2144    insulin lispro (HumaLOG) 100 units/mL subcutaneous injection 4-20 Units, 4-20 Units, Subcutaneous, TID AC, 16 Units at 05/10/21 1843 **AND** Fingerstick Glucose (POCT), , , TID AC, MORGAN White-C    lisinopril (ZESTRIL) tablet 20 mg, 20 mg, Oral, Daily, MAXIMO WhiteC, 20 mg at 05/10/21 1016    melatonin tablet 6 mg, 6 mg, Oral, HS, Kaleigh Fisher PA-C, 6 mg at 05/09/21 2144    methylPREDNISolone sodium succinate (Solu-MEDROL) injection 40 mg, 40 mg, Intravenous, Q12H [address], Kaleigh Fisher PA-C, 40 mg at 05/10/21 2008    metoprolol succinate (TOPROL-XL) 24 hr tablet 100 mg, 100 mg, Oral, Daily, Kaleigh Fisher PA-C, 100 mg at 05/10/21 1017    multivitamin-minerals (CENTRUM ADULTS) tablet 1 tablet, 1 tablet, Oral, Daily, MORGAN Fox-C, 1 tablet at 05/10/21 1017    nitroglycerin (NITROSTAT) SL tablet 0 4 mg, 0 4 mg, Sublingual, Q5 Min PRN, Stuart Lau PA-C    ondansetron [facility]) injection 4 mg, 4 mg, Intravenous, Q6H PRN, Stuart Lau, PA-C    pantoprazole (PROTONIX) EC tablet 40 mg, 40 mg, Oral, Early Morning, Stuart Lau, PA-C, 40 mg at 05/10/21 8605    [COMPLETED] remdesivir (Veklury) 200 mg in sodium chloride 0 9 % 250 mL IVPB, 200 mg, Intravenous, Q24H, 200 mg at 05/07/21 1711 **FOLLOWED BY** remdesivir (Veklury) 100 mg in sodium chloride 0 9 % 250 mL IVPB, 100 mg, Intravenous, Q24H, Stuart Lau, PA-C, Last Rate: 0 mL/hr at 05/09/21 1756, 100 mg at 05/10/21 1845    saccharomyces boulardii (FLORASTOR) capsule 250 mg, 250 mg, Oral, BID, Stuart Lau, PA-C, 250 mg at 05/10/21 1844    simethicone (MYLICON) chewable tablet 80 mg, 80 mg, Oral, Q6H PRN, Stuart Lau, PA-C, 80 mg at 05/08/21 1101    Principal Problem:    Pneumonia due to COVID-19 virus  Active Problems:    Coronary disease    Type 2 diabetes mellitus without complication, without long-term current use of insulin (HCC)    Dyslipidemia    Lymphoma ([facility] [address] )    Acute respiratory failure with hypoxia (HCC)    Chronic diastolic congestive heart failure ([facility] [address] )    Paroxysmal atrial fibrillation ([facility] [address] )    Severe sepsis ([facility] [address] )      Giulia Diggs MD

## 2021-05-11 NOTE — PROGRESS NOTES
Sudhakar Benjamin  77 y o   male  1945  mrn [id number]    Assessment/Plan:  1  Sepsis/Pneumonia/Presumed ongoing COVID19 infxn with pneumonia/Fever/ Leukocytosis/Hypoxia: No further fever on steroids but WBC has increased slightly to 14K - most likely due to steroid tx  Pulmonary status conts to improve and he was transferred back to Med/Surg from [address] [place] [address] on 5/10/21     He was started on tx for presumed ongoing COVID19 infxn with steroids and Remdesivir on 5/7/21  COVID19 Ab was neg so Pt was given convalescent plasma  Recent COVID19 test was neg but could be false neg result  CRP has decreased to 54 8  His supplemental O2 has decreased to 6L by [address]  Pt completed 7 day course of Levaquin on 5/8 for possible pneumonia     Pt's supplemental O2 requirement increased to 15L by [address] on 5/7  He was transferred to [address] [place] [address] on 5/8 because of worsening hypoxia  Chest CT showed increased pulmonary infilts but no PE  COVID19 IgG Ab was neg which indicated that the Pt did not develop immunity to recent COVID19 infxn due to his underlying lymphoma  Concerned that respiratory deterioration was due to ongoing COVID19 infxn  Pts with lymphoma are known to clear the virus more slowly  Repeat CRP had increased to 160 1 and Ferritin was 348  Pt received tx with IVIG on 5/6 as directed by his Oncologist which probably had some COVID19 Ab's     BNP was markedly elevated so there was probably a component of vol overload that was contributing to his current pulmonary sx's  He is being diuresed  Repeat BNP decreased to 6501  Repeat CXR did not show any signficant improvement with mild increase in RUL infilt      On admission, Pt had fever and leukocytosis, so need to consider possibility of pneumonia  Procalcitonin level was not markedly elevated but lactic acid was increased suggestive of sepsis  Urine Legionella Ag and Strep pneumo Ag were neg  Bld cx's are neg  MRSA screen   Inflammatory markers were still elevated from his recent COVID19 infxn which was tx'd with Remdesivir and decadron during his previous  admission from 4/6 to 4/10/21  He was re-admitted from 4/15 to 4/20 with increasing SOB and was tx'd with steroids       Pt presented with acute onset of increasing SOB and cough x 1 day       A  No abx tx is needed at this time  B  Cont Remdesivir x 10 days or until Pt is d/c'd since COVID19 Ab test was neg and chest CT showed increasing pulmonary infilts with worsening hypoxia - Pt with underlying lymphoma are known to take longer to clear the virus - s/p tx with convalescent plasma  C  Cont steroid tx as per Pulmonary  D  Cont supplemental O2 as needed  E  Cont diuresis as needed  F  Cont enhanced respiratory isolation for now - Pt will need another COVID19 test to determine when he can come out of isolation - since he was COVID19 Ab neg, he needs two neg COVID19 tests >/= 24 hrs apart before discontinuation of isolation can be considered  G  Will follow inflammatory markers and LFT's       Subjective: Pt looks stronger  Breathing is better  Supplemental O2 was increased to 8 L by [address] [facility] but has been decreased to 6L today    Objective:  Tmax: 98 2  Lungs: +Few crackles  Abd: +BS, soft, nontender  Ext: No calf tenderness    Labs:  CBC w/diff  Recent Labs     05/11/21  0832   WBC 14 73*   HGB 8 1*   HCT 27 3*      NEUTOPHILPCT 81*   LYMPHOPCT 10*   MONOPCT 7   EOSPCT 0     BMP  Recent Labs     05/11/21  0832   K 3 6      CO2 37*   BUN 30*   CREATININE 1 07   CALCIUM 7 9*     CMP  Recent Labs     05/11/21  0832   K 3 6      CO2 37*   BUN 30*   CREATININE 1 07   CALCIUM 7 9*   ALKPHOS 120*   ALT 38   AST 23        labrc    Cultures:  Lab Results   Component Value Date    BLOODCX No Growth After 5 Days  05/02/2021    BLOODCX No Growth After 5 Days  05/02/2021    BLOODCX No Growth After 5 Days  04/15/2021    BLOODCX No Growth After 5 Days  04/15/2021    BLOODCX No Growth After 5 Days   04/06/2021    BLOODCX No Growth After 5 Days  04/06/2021    BLOODCX No Growth After 5 Days  04/01/2021    BLOODCX No Growth After 5 Days  04/01/2021    BLOODCX No Growth After 5 Days  02/27/2019    BLOODCX No Growth After 5 Days  02/27/2019    BLOODCX No Growth After 5 Days  11/25/2018    BLOODCX No Growth After 5 Days   11/25/2018     No results found for: WOUNDCULT  No results found for: URINECX  No results found for: SPUTUMCULTUR    MED:  Remdesivir #5           Completed:  Vanco x 4 days on 5/5   Levaquin x 7 days on 5/8         Current Facility-Administered Medications:     acetaminophen (TYLENOL) tablet 650 mg, 650 mg, Oral, Q6H PRN, Colleen Shayla, PA-C    albuterol (PROVENTIL HFA,VENTOLIN HFA) inhaler 2 puff, 2 puff, Inhalation, TID, Colleen Shayla, PA-C, 2 puff at 05/11/21 6069    apixaban (ELIQUIS) tablet 2 5 mg, 2 5 mg, Oral, BID, Colleen Shayla, PA-C, 2 5 mg at 05/11/21 7106    aspirin chewable tablet 81 mg, 81 mg, Oral, Daily, Colleen Shayla, PA-C, 81 mg at 05/11/21 9767    atorvastatin (LIPITOR) tablet 40 mg, 40 mg, Oral, Daily With Dinner, Colleen Shayla, PA-C, 40 mg at 05/10/21 1844    cholecalciferol (VITAMIN D3) tablet 2,000 Units, 2,000 Units, Oral, Daily, Colleen Shayla, PA-C, 2,000 Units at 05/11/21 0360    ferrous sulfate tablet 325 mg, 325 mg, Oral, Daily With Breakfast, Colleen Shayla, PA-C, 325 mg at 05/11/21 9143    furosemide (LASIX) tablet 40 mg, 40 mg, Oral, BID (diuretic), Colleen Shayla, PA-C, 40 mg at 05/11/21 0819    insulin lispro (HumaLOG) 100 units/mL subcutaneous injection 2-12 Units, 2-12 Units, Subcutaneous, HS, Colleen Shayla, PA-C, 4 Units at 05/10/21 2321    insulin lispro (HumaLOG) 100 units/mL subcutaneous injection 4-20 Units, 4-20 Units, Subcutaneous, TID AC, 12 Units at 05/11/21 1248 **AND** Fingerstick Glucose (POCT), , , TID AC, Colleen Mcguire PA-C    insulin lispro (HumaLOG) 100 units/mL subcutaneous injection 5 Units, 5 Units, Subcutaneous, TID With Meals, Ana Rosa Wu MORGAN Shanks-C, 5 Units at 05/11/21 1249    lisinopril (ZESTRIL) tablet 20 mg, 20 mg, Oral, Daily, Belinda Nail, PA-C, 20 mg at 05/11/21 0819    melatonin tablet 6 mg, 6 mg, Oral, HS, Belinda Nail, PA-C, 6 mg at 05/10/21 2319    methylPREDNISolone sodium succinate (Solu-MEDROL) injection 40 mg, 40 mg, Intravenous, Q12H [address], Belinda Nail, PA-C, 40 mg at 05/11/21 1735    metoprolol succinate (TOPROL-XL) 24 hr tablet 100 mg, 100 mg, Oral, Daily, Belinda Nail, PA-C, 100 mg at 05/11/21 9645    multivitamin-minerals (CENTRUM ADULTS) tablet 1 tablet, 1 tablet, Oral, Daily, Belinda Shelton, PA-C, 1 tablet at 05/11/21 0819    nitroglycerin (NITROSTAT) SL tablet 0 4 mg, 0 4 mg, Sublingual, Q5 Min PRN, Belinad Nail, PA-C    ondansetron [facility]) injection 4 mg, 4 mg, Intravenous, Q6H PRN, Belinda Nail, PA-C    pantoprazole (PROTONIX) EC tablet 40 mg, 40 mg, Oral, Early Morning, Belinda Nail, PA-C, 40 mg at 05/11/21 4200    [COMPLETED] remdesivir (Veklury) 200 mg in sodium chloride 0 9 % 250 mL IVPB, 200 mg, Intravenous, Q24H, 200 mg at 05/07/21 1711 **FOLLOWED BY** remdesivir (Veklury) 100 mg in sodium chloride 0 9 % 250 mL IVPB, 100 mg, Intravenous, Q24H, Elizabet Singleton MD, Stopped at 05/10/21 1945    saccharomyces boulardii (FLORASTOR) capsule 250 mg, 250 mg, Oral, BID, Belinda Nail, PA-C, 250 mg at 05/11/21 0115    simethicone (MYLICON) chewable tablet 80 mg, 80 mg, Oral, Q6H PRN, Belinda Nail, PA-C, 80 mg at 05/08/21 1101    Principal Problem:    Pneumonia due to COVID-19 virus  Active Problems:    Coronary disease    Type 2 diabetes mellitus without complication, without long-term current use of insulin (HCC)    Dyslipidemia    Lymphoma ([facility] )    Acute respiratory failure with hypoxia (HCC)    Chronic diastolic congestive heart failure ([facility] )    Paroxysmal atrial fibrillation ([facility] )    Severe sepsis ([facility] )      Elizabet Singleton MD

## 2021-05-11 NOTE — PROGRESS NOTES
Pulmonary Progress Note  Ericka Ortiz 77 y o  male MRN: [id number]  Unit/Bed#: -01 Encounter: [id number]      Assesment and Recommendations:    1  Acute hypoxic respiratory failure secondary to recent COVID pneumonia - slowly improving  ·   The patient continues with persistent hypoxia/supplemental oxygen needs  He does better during the day when he is awake as opposed to at night  Will continue to wean as tolerated  ·  will wean Solu-Medrol to 30 mg q 12 hours, hopeful to transition to prednisone in the next 24-48 hours  ·  continue to encourage to be out of bed, using incentive spirometer     2  Chronic diastolic congestive heart failure with small bilateral pleural effusions  ·  continue Lasix     3  Bilateral pulmonary nodules with history of underlying lymphoma  ·  outpatient follow-up with repeat imaging    Chief Complaint:  Had a rough night    Subjective:  He had an episode of acute hypoxia and dyspnea with exertion to the bathroom overnight  Currently feels better  Still with dry cough    Vitals: Blood pressure 128/60, pulse 84, temperature 97 6 °F (36 4 °C), resp  rate 18, height 5' 11" (1 803 m), weight 69 8 kg (153 lb 12 8 oz), SpO2 94 %  , 8L, Body mass index is 21 45 kg/m²        Intake/Output Summary (Last 24 hours) at 5/11/2021 1040  Last data filed at 5/11/2021 0311  Gross per 24 hour   Intake 1210 ml   Output 1200 ml   Net 10 ml       Physical exam:  General:  Patient is awake, alert, non-toxic and in no acute respiratory distress  Neck: No JVD  CV:  Regular, +S1 and S2, No murmurs, gallops or rubs appreciated  Lungs: Clear to auscultation bilateral without wheeze, rales or rhonci  Abdomen: Soft, +BS, Non-tender, non-distended  Extremities: No clubbing, cyanosis or edema  Neuro: No focal deficits    Labs:   CBC:   Lab Results   Component Value Date    WBC 14 73 (H) 05/11/2021    HGB 8 1 (L) 05/11/2021    HCT 27 3 (L) 05/11/2021    MCV 80 (L) 05/11/2021     05/11/2021    MCH 23 8 (L) 05/11/2021    MCHC 29 7 (L) 05/11/2021    RDW 18 6 (H) 05/11/2021    MPV 12 0 05/11/2021    NRBC 0 05/11/2021   , CMP:   Lab Results   Component Value Date    SODIUM 142 05/11/2021    K 3 6 05/11/2021     05/11/2021    CO2 37 (H) 05/11/2021    BUN 30 (H) 05/11/2021    CREATININE 1 07 05/11/2021    CALCIUM 7 9 (L) 05/11/2021    AST 23 05/11/2021    ALT 38 05/11/2021    ALKPHOS 120 (H) 05/11/2021    EGFR 68 05/11/2021       Wallene Carbon, DO      Portions of the record may have been created with voice recognition software  Occasional wrong word or "sound a like" substitutions may have occurred due to the inherent limitations of voice recognition software  Read the chart carefully and recognize, using context, where substitutions have occurred

## 2021-05-11 NOTE — PROGRESS NOTES
[place]     Progress Note - Tobias Hooker 1945, 77 y o  male MRN: [id number]  Unit/Bed#: -01 Encounter: [id number]  Primary Care Provider: Wayne Uriostegui DO   Date and time admitted to hospital: 5/2/2021  2:28 PM    * Pneumonia due to COVID-19 virus  Assessment & Plan  · Patient was diagnosed with COVID on 04/06  · Patient was admitted twice after diagnosis from 4/ 6-4/10 and also from 4/15-4/20   · Patient finished steroids/supportive care  · Came to the hospital with worsening of shortness of breath and chest x-ray was suggestive of worsening of infiltrate  · Given fever possibility of secondary bacterial pneumonia however initial procalcitonin was normal, patient completed abx  · ID consulted on admission  · Patient completed antibiotics for possible superimposed bacterial infection  · Patient received convalescent plasma 5/7 and re-initiated remdesivir, dose ,#3 today  · Repeat COVID-19 test-5/7 however, could be false negative    · Continue to follow closely with ID  · Pulmonary consulted  · Resume IV steroids given worsening of consolidations on CT scan and worsening respiratory status  · Currently on 6L midflow, pulmonology following - appreciate ongoing recommendations    Severe sepsis [facility])  Assessment & Plan  · Secondary to HCAP?  · Patient met the criteria for sepsis with fever and leukocytosis  · Patient also noted to have lactic acidosis and tachypnea  · Repeat lactic acid normal  · Completed antibiotics  · Blood cultures negative x5 days    Paroxysmal atrial fibrillation ([address] )  Assessment & Plan  · Patient is on Eliquis as outpatient  · Rate controlled on metoprolol  · Continue with Eliquis    Chronic diastolic congestive heart failure ([address] )  Assessment & Plan  Wt Readings from Last 3 Encounters:   05/10/21 70 3 kg (154 lb 15 7 oz)   04/20/21 73 2 kg (161 lb 6 oz)   04/10/21 76 8 kg (169 lb 5 oz)     · Diuretics initially held on admission - home regimen includes Lasix 20 mg twice daily patient reports compliance - overall on exam appears euvolemic  · Elevated BNP on admission around 7 K, repeat showed 11 K  · Concern for possible cardiac etiology for shortness of breath/worsening hypoxia  · Obtain echocardiogram - EF 40% appears similar to prior  · Good urine output with lasix 40 mg BID  · Diurese as tolerated, monitor creatinine, if creatinine peaks, will resume home dose diuretics  · Transition back to home diuretics 5/8, but increase dose to 40mg BID  · CTA does suggest pulmonary effusion and congestion    Acute respiratory failure with hypoxia (HCC)  Assessment & Plan  · Patient with recent diagnosis of COVID and was on 2 L nasal cannula as outpatient, patient reports that he otherwise was doing well  · Concern for possible cardiac etiology - echocardiogram completed, EF 40% appears similar to prior  · Patient got acutely short of breath prior to admission  · Currently on 6L mid flow  · Will trial IV diuresis given elevated BNP, good urine output-  40 milligram IV Lasix b i d   · Transitioned back to home dose Lasix 20mg BID 5/8  · Also recently had PET scan completed as output, working with [facility] onc- nodules and lymph nodes illuminated, question reactive inflammatory response versus tumor  Patient will need outpatient follow-up with his oncologist   · Continue with the supplemental oxygen and wean off as tolerated    Lymphoma ([address] )  Assessment & Plan  · Follows with Dr Delilah Cushing with [facility] [place] [phone]  · Considering lung biopsy, patient currently not stable enough to undergo biopsy  · PET scan was done through [facility]- concerning for illuminating areas, question possible reactive inflammatory response versus tumors    Patient will need outpatient follow-up with his oncologist   · Recommending IVIG- ordered 5/6  · May be contributing to persistence of COVID 19 symptoms    Dyslipidemia  Assessment & Plan  · Continue statin    Type 2 diabetes mellitus without complication, without long-term current use of insulin [facility])  Assessment & Plan  Lab Results   Component Value Date    HGBA1C 5 8 (H) 12/30/2020       Recent Labs     05/09/21  1136 05/09/21  1554 05/09/21  2142 05/10/21  0815   POCGLU 376* 461* 259* 209*       Blood Sugar Average: Last 72 hrs:  (P) 298 8   · Hold oral agents while inpatient  · SSI + accuchek  · Previously discontinued diabetic diet, patient has been unable to tolerate artificial sweeteners  · Will resume regular diet and increase sliding scale insulin regimen  · Started on Lantus 8 units at bedtime 5/9 given persistently elevated sugars  · Patient apparently had itching type of allergic reaction to Lantus and no longer wishes to take it  Was given Benadryl for reaction  Was transitioned back to diabetic diet with sliding scale on 5/10; will add humalog 5 units TID with meals standing dose    Coronary disease  Assessment & Plan  · Status post CABG  · Continue with the aspirin statin and beta-blocker    VTE Pharmacologic Prophylaxis:   Pharmacologic: Apixaban (Eliquis)  Mechanical VTE Prophylaxis in Place: Yes    Patient Centered Rounds: I have performed bedside rounds with nursing staff today  Discussions with Specialists or Other Care Team Provider:  Nursing, CM, appreciate pulm/ID input    Education and Discussions with Family / Patient:  Discussed with patient at bedside  Discussed at length with patient's wife via phone  Time Spent for Care: 30 minutes  More than 50% of total time spent on counseling and coordination of care as described above  Current Length of Stay: 9 day(s)    Current Patient Status: Inpatient   Certification Statement: The patient will continue to require additional inpatient hospital stay due to Acute respiratory failure with hypoxia    Discharge Plan:  Pending clinical course    Code Status: Level 1 - Full Code      Subjective:   Patient states he continues to feel well    Does note some worsening shortness of breath at night when he tries to lay down  Denies chest pain/palpitations, nausea/vomiting, abdominal pain  Objective:     Vitals:   Temp (24hrs), Av 7 °F (36 5 °C), Min:97 4 °F (36 3 °C), Max:98 °F (36 7 °C)    Temp:  [97 4 °F (36 3 °C)-98 °F (36 7 °C)] 97 6 °F (36 4 °C)  HR:  [69-84] 84  Resp:  [18-20] 18  BP: (102-129)/(52-60) 128/60  SpO2:  [77 %-99 %] 99 %  Body mass index is 21 45 kg/m²  Input and Output Summary (last 24 hours): Intake/Output Summary (Last 24 hours) at 2021 1231  Last data filed at 2021 0852  Gross per 24 hour   Intake 1750 ml   Output 1200 ml   Net 550 ml       Physical Exam:     Physical Exam  Vitals signs and nursing note reviewed  Constitutional:       Appearance: He is well-developed  Interventions: Nasal cannula in place  Comments: Appears comfortable, no acute distress   HENT:      Head: Normocephalic and atraumatic  Eyes:      General: No scleral icterus  Extraocular Movements: Extraocular movements intact  Conjunctiva/sclera: Conjunctivae normal    Neck:      Musculoskeletal: Normal range of motion  Cardiovascular:      Rate and Rhythm: Normal rate and regular rhythm  Heart sounds: S1 normal and S2 normal  No murmur  Pulmonary:      Effort: Pulmonary effort is normal  No respiratory distress  Breath sounds: Normal breath sounds  No wheezing, rhonchi or rales  Abdominal:      General: Bowel sounds are normal       Palpations: Abdomen is soft  Tenderness: There is no abdominal tenderness  There is no guarding or rebound  Musculoskeletal:      Comments: Able to move upper/lower extremities bilaterally, no extremity edema   Skin:     General: Skin is warm and dry  Neurological:      Mental Status: He is alert and oriented to person, place, and time     Psychiatric:         Mood and Affect: Mood normal          Speech: Speech normal          Behavior: Behavior normal            Additional Data:     Labs:    Results from last 7 days   Lab Units 05/11/21  0832   WBC Thousand/uL 14 73*   HEMOGLOBIN g/dL 8 1*   HEMATOCRIT % 27 3*   PLATELETS Thousands/uL 191   NEUTROS PCT % 81*   LYMPHS PCT % 10*   MONOS PCT % 7   EOS PCT % 0     Results from last 7 days   Lab Units 05/11/21  0832   SODIUM mmol/L 142   POTASSIUM mmol/L 3 6   CHLORIDE mmol/L 101   CO2 mmol/L 37*   BUN mg/dL 30*   CREATININE mg/dL 1 07   ANION GAP mmol/L 4   CALCIUM mg/dL 7 9*   ALBUMIN g/dL 2 0*   TOTAL BILIRUBIN mg/dL 0 80   ALK PHOS U/L 120*   ALT U/L 38   AST U/L 23   GLUCOSE RANDOM mg/dL 164*         Results from last 7 days   Lab Units 05/11/21  1117 05/11/21  0816 05/10/21  2318 05/10/21  1733 05/10/21  1056 05/10/21  0815 05/09/21  2142 05/09/21  1554 05/09/21  1136 05/09/21  0755 05/09/21  0337 05/08/21  2326   POC GLUCOSE mg/dl 314* 175* 237* 368* 354* 209* 259* 461* 376* 261* 270* 345*                   * I Have Reviewed All Lab Data Listed Above  * Additional Pertinent Lab Tests Reviewed:  All Labs Within Last 24 Hours Reviewed    Imaging:    Imaging Reports Reviewed Today Include: CTA PE study 5/6  Imaging Personally Reviewed by Myself Includes:      Recent Cultures (last 7 days):           Last 24 Hours Medication List:   Current Facility-Administered Medications   Medication Dose Route Frequency Provider Last Rate    acetaminophen  650 mg Oral Q6H PRN Leroy Smoker, PA-C      albuterol  2 puff Inhalation TID Leroy Smoker, PA-C      apixaban  2 5 mg Oral BID Leroy Smoker, PA-C      aspirin  81 mg Oral Daily Leroy Smoker, PA-C      atorvastatin  40 mg Oral Daily With Marcelle Grumbles, PA-C      cholecalciferol  2,000 Units Oral Daily Leroy Smoker, PA-C      ferrous sulfate  325 mg Oral Daily With Breakfast Leroy Smoker, PA-C      furosemide  40 mg Oral BID (diuretic) Leroy Smoker, PA-C      insulin lispro  2-12 Units Subcutaneous HS Leroy Smoker, PA-C      insulin lispro 4-20 Units Subcutaneous TID AC Michele Haider PA-C      insulin lispro  5 Units Subcutaneous TID With Meals Gisella Lopez PA-C      lisinopril  20 mg Oral Daily Michele Haider PA-C      melatonin  6 mg Oral HS Michele Haider PA-C      methylPREDNISolone sodium succinate  40 mg Intravenous Q12H [address] Michele Haider PA-C      metoprolol succinate  100 mg Oral Daily Michele Haider PA-C      multivitamin-minerals  1 tablet Oral Daily Filemon May      nitroglycerin  0 4 mg Sublingual Q5 Min PRN Michele Haider PA-C      ondansetron  4 mg Intravenous Q6H PRN Michele Haider PA-C      pantoprazole  40 mg Oral Early Morning Michele Haider PA-C      remdesivir  100 mg Intravenous Q24H Jossie Ramirez MD Stopped (05/10/21 1945)    saccharomyces boulardii  250 mg Oral BID Michele Haider PA-C      simethicone  80 mg Oral Q6H PRN Michele Haider PA-C          Today, Patient Was Seen By: Casimiro Nation PA-C    ** Please Note: Dictation voice to text software may have been used in the creation of this document   **

## 2021-05-12 ENCOUNTER — APPOINTMENT (INPATIENT)
Dept: RADIOLOGY | Facility: HOSPITAL | Age: 76
DRG: 871 | End: 2021-05-12
Payer: COMMERCIAL

## 2021-05-12 LAB
ALBUMIN SERPL BCP-MCNC: 2.4 G/DL (ref 3.5–5)
ALP SERPL-CCNC: 142 U/L (ref 46–116)
ALT SERPL W P-5'-P-CCNC: 33 U/L (ref 12–78)
ANION GAP SERPL CALCULATED.3IONS-SCNC: 10 MMOL/L (ref 4–13)
AST SERPL W P-5'-P-CCNC: 18 U/L (ref 5–45)
BASOPHILS # BLD AUTO: 0.04 THOUSANDS/ΜL (ref 0–0.1)
BASOPHILS NFR BLD AUTO: 0 % (ref 0–1)
BILIRUB SERPL-MCNC: 0.9 MG/DL (ref 0.2–1)
BUN SERPL-MCNC: 27 MG/DL (ref 5–25)
CALCIUM ALBUM COR SERPL-MCNC: 9.2 MG/DL (ref 8.3–10.1)
CALCIUM SERPL-MCNC: 7.9 MG/DL (ref 8.3–10.1)
CHLORIDE SERPL-SCNC: 101 MMOL/L (ref 100–108)
CO2 SERPL-SCNC: 31 MMOL/L (ref 21–32)
CREAT SERPL-MCNC: 0.96 MG/DL (ref 0.6–1.3)
CRP SERPL QL: 27.2 MG/L
D DIMER PPP FEU-MCNC: 7.93 UG/ML FEU
EOSINOPHIL # BLD AUTO: 0.01 THOUSAND/ΜL (ref 0–0.61)
EOSINOPHIL NFR BLD AUTO: 0 % (ref 0–6)
ERYTHROCYTE [DISTWIDTH] IN BLOOD BY AUTOMATED COUNT: 19.5 % (ref 11.6–15.1)
GFR SERPL CREATININE-BSD FRML MDRD: 78 ML/MIN/1.73SQ M
GLUCOSE SERPL-MCNC: 155 MG/DL (ref 65–140)
GLUCOSE SERPL-MCNC: 217 MG/DL (ref 65–140)
GLUCOSE SERPL-MCNC: 220 MG/DL (ref 65–140)
GLUCOSE SERPL-MCNC: 285 MG/DL (ref 65–140)
GLUCOSE SERPL-MCNC: 418 MG/DL (ref 65–140)
GLUCOSE SERPL-MCNC: 496 MG/DL (ref 65–140)
HCT VFR BLD AUTO: 27.4 % (ref 36.5–49.3)
HGB BLD-MCNC: 8.2 G/DL (ref 12–17)
IMM GRANULOCYTES # BLD AUTO: 0.32 THOUSAND/UL (ref 0–0.2)
IMM GRANULOCYTES NFR BLD AUTO: 2 % (ref 0–2)
LDH SERPL-CCNC: 323 U/L (ref 81–234)
LYMPHOCYTES # BLD AUTO: 1.04 THOUSANDS/ΜL (ref 0.6–4.47)
LYMPHOCYTES NFR BLD AUTO: 6 % (ref 14–44)
MCH RBC QN AUTO: 24 PG (ref 26.8–34.3)
MCHC RBC AUTO-ENTMCNC: 29.9 G/DL (ref 31.4–37.4)
MCV RBC AUTO: 80 FL (ref 82–98)
MONOCYTES # BLD AUTO: 0.78 THOUSAND/ΜL (ref 0.17–1.22)
MONOCYTES NFR BLD AUTO: 4 % (ref 4–12)
NEUTROPHILS # BLD AUTO: 15.61 THOUSANDS/ΜL (ref 1.85–7.62)
NEUTS SEG NFR BLD AUTO: 88 % (ref 43–75)
PLATELET # BLD AUTO: 327 THOUSANDS/UL (ref 149–390)
PMV BLD AUTO: 10.2 FL (ref 8.9–12.7)
POTASSIUM SERPL-SCNC: 4.2 MMOL/L (ref 3.5–5.3)
PROCALCITONIN SERPL-MCNC: 0.13 NG/ML
PROT SERPL-MCNC: 6 G/DL (ref 6.4–8.2)
RBC # BLD AUTO: 3.42 MILLION/UL (ref 3.88–5.62)
SARS-COV-2 RNA RESP QL NAA+PROBE: NEGATIVE
SODIUM SERPL-SCNC: 142 MMOL/L (ref 136–145)
WBC # BLD AUTO: 17.8 THOUSAND/UL (ref 4.31–10.16)

## 2021-05-12 PROCEDURE — U0005 INFEC AGEN DETEC AMPLI PROBE: HCPCS | Performed by: INTERNAL MEDICINE

## 2021-05-12 PROCEDURE — 71045 X-RAY EXAM CHEST 1 VIEW: CPT

## 2021-05-12 PROCEDURE — 99292 CRITICAL CARE ADDL 30 MIN: CPT | Performed by: INTERNAL MEDICINE

## 2021-05-12 PROCEDURE — 85025 COMPLETE CBC W/AUTO DIFF WBC: CPT | Performed by: INTERNAL MEDICINE

## 2021-05-12 PROCEDURE — U0003 INFECTIOUS AGENT DETECTION BY NUCLEIC ACID (DNA OR RNA); SEVERE ACUTE RESPIRATORY SYNDROME CORONAVIRUS 2 (SARS-COV-2) (CORONAVIRUS DISEASE [COVID-19]), AMPLIFIED PROBE TECHNIQUE, MAKING USE OF HIGH THROUGHPUT TECHNOLOGIES AS DESCRIBED BY CMS-2020-01-R: HCPCS | Performed by: INTERNAL MEDICINE

## 2021-05-12 PROCEDURE — 83615 LACTATE (LD) (LDH) ENZYME: CPT | Performed by: INTERNAL MEDICINE

## 2021-05-12 PROCEDURE — 87449 NOS EACH ORGANISM AG IA: CPT | Performed by: INTERNAL MEDICINE

## 2021-05-12 PROCEDURE — 86140 C-REACTIVE PROTEIN: CPT | Performed by: INTERNAL MEDICINE

## 2021-05-12 PROCEDURE — 80053 COMPREHEN METABOLIC PANEL: CPT | Performed by: INTERNAL MEDICINE

## 2021-05-12 PROCEDURE — 85379 FIBRIN DEGRADATION QUANT: CPT | Performed by: INTERNAL MEDICINE

## 2021-05-12 PROCEDURE — 94640 AIRWAY INHALATION TREATMENT: CPT

## 2021-05-12 PROCEDURE — 84145 PROCALCITONIN (PCT): CPT | Performed by: NURSE PRACTITIONER

## 2021-05-12 PROCEDURE — 99291 CRITICAL CARE FIRST HOUR: CPT | Performed by: NURSE PRACTITIONER

## 2021-05-12 PROCEDURE — 94760 N-INVAS EAR/PLS OXIMETRY 1: CPT

## 2021-05-12 PROCEDURE — 82948 REAGENT STRIP/BLOOD GLUCOSE: CPT

## 2021-05-12 RX ORDER — FUROSEMIDE 10 MG/ML
40 INJECTION INTRAMUSCULAR; INTRAVENOUS
Status: DISCONTINUED | OUTPATIENT
Start: 2021-05-12 | End: 2021-05-12

## 2021-05-12 RX ORDER — FUROSEMIDE 10 MG/ML
40 INJECTION INTRAMUSCULAR; INTRAVENOUS
Status: DISCONTINUED | OUTPATIENT
Start: 2021-05-12 | End: 2021-05-13

## 2021-05-12 RX ORDER — LEVALBUTEROL 1.25 MG/.5ML
1.25 SOLUTION, CONCENTRATE RESPIRATORY (INHALATION)
Status: DISCONTINUED | OUTPATIENT
Start: 2021-05-12 | End: 2021-05-13

## 2021-05-12 RX ORDER — LEVALBUTEROL 1.25 MG/.5ML
1.25 SOLUTION, CONCENTRATE RESPIRATORY (INHALATION)
Status: DISCONTINUED | OUTPATIENT
Start: 2021-05-12 | End: 2021-05-12

## 2021-05-12 RX ORDER — SULFAMETHOXAZOLE AND TRIMETHOPRIM 800; 160 MG/1; MG/1
2 TABLET ORAL EVERY 8 HOURS SCHEDULED
Status: DISCONTINUED | OUTPATIENT
Start: 2021-05-12 | End: 2021-05-16

## 2021-05-12 RX ORDER — METHYLPREDNISOLONE SODIUM SUCCINATE 125 MG/2ML
125 INJECTION, POWDER, LYOPHILIZED, FOR SOLUTION INTRAMUSCULAR; INTRAVENOUS EVERY 6 HOURS SCHEDULED
Status: DISCONTINUED | OUTPATIENT
Start: 2021-05-12 | End: 2021-05-14

## 2021-05-12 RX ADMIN — METHYLPREDNISOLONE SODIUM SUCCINATE 125 MG: 125 INJECTION, POWDER, FOR SOLUTION INTRAMUSCULAR; INTRAVENOUS at 17:35

## 2021-05-12 RX ADMIN — INSULIN LISPRO 12 UNITS: 100 INJECTION, SOLUTION INTRAVENOUS; SUBCUTANEOUS at 11:29

## 2021-05-12 RX ADMIN — INSULIN LISPRO 5 UNITS: 100 INJECTION, SOLUTION INTRAVENOUS; SUBCUTANEOUS at 11:30

## 2021-05-12 RX ADMIN — ATORVASTATIN CALCIUM 40 MG: 40 TABLET, FILM COATED ORAL at 16:32

## 2021-05-12 RX ADMIN — IPRATROPIUM BROMIDE 0.5 MG: 0.5 SOLUTION RESPIRATORY (INHALATION) at 13:24

## 2021-05-12 RX ADMIN — SULFAMETHOXAZOLE AND TRIMETHOPRIM 2 TABLET: 800; 160 TABLET ORAL at 11:31

## 2021-05-12 RX ADMIN — LEVALBUTEROL HYDROCHLORIDE 1.25 MG: 1.25 SOLUTION, CONCENTRATE RESPIRATORY (INHALATION) at 19:47

## 2021-05-12 RX ADMIN — FUROSEMIDE 40 MG: 10 INJECTION, SOLUTION INTRAVENOUS at 11:32

## 2021-05-12 RX ADMIN — METHYLPREDNISOLONE SODIUM SUCCINATE 125 MG: 125 INJECTION, POWDER, FOR SOLUTION INTRAMUSCULAR; INTRAVENOUS at 23:09

## 2021-05-12 RX ADMIN — MELATONIN TAB 3 MG 6 MG: 3 TAB at 21:13

## 2021-05-12 RX ADMIN — INSULIN LISPRO 5 UNITS: 100 INJECTION, SOLUTION INTRAVENOUS; SUBCUTANEOUS at 16:32

## 2021-05-12 RX ADMIN — INSULIN LISPRO 12 UNITS: 100 INJECTION, SOLUTION INTRAVENOUS; SUBCUTANEOUS at 16:31

## 2021-05-12 RX ADMIN — IPRATROPIUM BROMIDE 0.5 MG: 0.5 SOLUTION RESPIRATORY (INHALATION) at 07:43

## 2021-05-12 RX ADMIN — Medication 250 MG: at 17:34

## 2021-05-12 RX ADMIN — ENOXAPARIN SODIUM 70 MG: 80 INJECTION SUBCUTANEOUS at 17:34

## 2021-05-12 RX ADMIN — METHYLPREDNISOLONE SODIUM SUCCINATE 30 MG: 40 INJECTION, POWDER, FOR SOLUTION INTRAMUSCULAR; INTRAVENOUS at 08:26

## 2021-05-12 RX ADMIN — ASPIRIN 81 MG CHEWABLE TABLET 81 MG: 81 TABLET CHEWABLE at 08:27

## 2021-05-12 RX ADMIN — FAMOTIDINE 20 MG: 10 INJECTION INTRAVENOUS at 08:27

## 2021-05-12 RX ADMIN — REMDESIVIR 100 MG: 100 INJECTION, POWDER, LYOPHILIZED, FOR SOLUTION INTRAVENOUS at 17:34

## 2021-05-12 RX ADMIN — LEVALBUTEROL HYDROCHLORIDE 1.25 MG: 1.25 SOLUTION, CONCENTRATE RESPIRATORY (INHALATION) at 07:43

## 2021-05-12 RX ADMIN — FAMOTIDINE 20 MG: 10 INJECTION INTRAVENOUS at 21:15

## 2021-05-12 RX ADMIN — INSULIN LISPRO 5 UNITS: 100 INJECTION, SOLUTION INTRAVENOUS; SUBCUTANEOUS at 08:24

## 2021-05-12 RX ADMIN — Medication 250 MG: at 08:28

## 2021-05-12 RX ADMIN — APIXABAN 2.5 MG: 2.5 TABLET, FILM COATED ORAL at 08:28

## 2021-05-12 RX ADMIN — MULTIPLE VITAMINS W/ MINERALS TAB 1 TABLET: TAB ORAL at 08:28

## 2021-05-12 RX ADMIN — Medication 2000 UNITS: at 08:28

## 2021-05-12 RX ADMIN — FERROUS SULFATE TAB 325 MG (65 MG ELEMENTAL FE) 325 MG: 325 (65 FE) TAB at 08:28

## 2021-05-12 RX ADMIN — LEVALBUTEROL HYDROCHLORIDE 1.25 MG: 1.25 SOLUTION, CONCENTRATE RESPIRATORY (INHALATION) at 13:24

## 2021-05-12 RX ADMIN — PANTOPRAZOLE SODIUM 40 MG: 40 TABLET, DELAYED RELEASE ORAL at 05:45

## 2021-05-12 RX ADMIN — INSULIN LISPRO 20 UNITS: 100 INJECTION, SOLUTION INTRAVENOUS; SUBCUTANEOUS at 21:16

## 2021-05-12 RX ADMIN — LISINOPRIL 20 MG: 20 TABLET ORAL at 08:28

## 2021-05-12 RX ADMIN — METHYLPREDNISOLONE SODIUM SUCCINATE 125 MG: 125 INJECTION, POWDER, FOR SOLUTION INTRAMUSCULAR; INTRAVENOUS at 11:32

## 2021-05-12 RX ADMIN — INSULIN LISPRO 8 UNITS: 100 INJECTION, SOLUTION INTRAVENOUS; SUBCUTANEOUS at 08:24

## 2021-05-12 RX ADMIN — METOPROLOL SUCCINATE 100 MG: 50 TABLET, EXTENDED RELEASE ORAL at 08:28

## 2021-05-12 RX ADMIN — SULFAMETHOXAZOLE AND TRIMETHOPRIM 2 TABLET: 800; 160 TABLET ORAL at 21:16

## 2021-05-12 RX ADMIN — FUROSEMIDE 40 MG: 10 INJECTION, SOLUTION INTRAVENOUS at 17:34

## 2021-05-12 RX ADMIN — FUROSEMIDE 40 MG: 10 INJECTION, SOLUTION INTRAMUSCULAR; INTRAVENOUS at 08:27

## 2021-05-12 RX ADMIN — IPRATROPIUM BROMIDE 0.5 MG: 0.5 SOLUTION RESPIRATORY (INHALATION) at 19:48

## 2021-05-12 NOTE — ASSESSMENT & PLAN NOTE
· Secondary to HCAP  · On admission patient met criteria for sepsis with fever leukocytosis  · Patient did have lactic acidosis and tachypnea  · Antibiotics completed  · Blood cultures negative x5 days

## 2021-05-12 NOTE — PROGRESS NOTES
Pt oxygen saturation at 88% on 15 L midflow  Pt wearing nonrebreather with midflow and saturating 98%  Pt evaluated by critical care Kyree Neal  Pt transferred to ICU for HFNC and observation

## 2021-05-12 NOTE — APP STUDENT NOTE
DEION STUDENT  Inpatient Progress Note for TRAINING ONLY  Not Part of Legal Medical Record       Progress Note - Altaf Shepherd 77 y o  male MRN: [id number]    Unit/Bed#: -01 Encounter: [id number]      Assessment:    PNA due to COVID 19     Coronary disease  Essential hypertension  Paroxysmal atrial fibrillation  Dyslipidemia  Chronic diastolic congestive heart failure  Type 2 diabetes mellitus without complication, without long-term current use of insulin  Lymphoma  Severe sepsis    Plan:    Cardiac:   Coronary disease  · Status post CABG  · Continue aspirin, statin, beta-blocker    Essential hypertension  · Continue Toprol    Dyslipidemia  Continue Lipitor     Paroxysmal atrial fibrillation:   · Patient is on Eliquis as outpatient  · Rate controlled on Lopressor  · Transition to heparin/      Chronic diastolic congestive heart failure:        Wt Readings from Last 3 Encounters:   05/11/21 69 8 kg (153 lb 12 8 oz)   04/20/21 73 2 kg (161 lb 6 oz)   04/10/21 76 8 kg (169 lb 5 oz)       Initially, diuretics held on admission - home dose Lasix 20 mg b i d   · Currently being diuresed with Lasix 40 mg b i d  IV  · Elevated BNP on admission  · Echocardiogram shows EF 40%  · CTA shows pulmonary infusions congestion - continue IV diuresis, chest x-ray worse        Respiratory:   Pneumonia due to COVID-19 virus  · Covid + on 4/6  · Has had two admissions since, 4/6 to 4/10 and 4/15 to 4/20  · Presented to the hospital on 5/2 for worsening SOB and worsening infiltrate  · ID consulted on admission - antibiotics completed  · Procalcitonin negative  · Continue to trend   · Convalescent plasma given on 5/7  · Pulmonary following - on 5/11 steroids decreased to 30 mg q12H - consider increasing in the setting of worsening CXR and hypoxia  · Transfer to step down unit on 5/12 for worsening hypoxia and increased oxygen demands   · Currently high-flow nasal cannula - titrate for O2 88% or greater   · Given worsening status will pulse dose steroids   · Start Remdesivir     Acute respiratory failure with hypoxia:   · Secondary to COVID-19 as evidenced by increased shortness of breath and work of breathing  · Patient initially on mid flow however transferred to step-down unit on 05/12 4 high-flow nasal cannula  · Chest x-ray with worsening bilateral infiltrates and pleural effusions  · Continue IV diuresis - home dose Lasix 20 mg b i d   · Increase diuresis to q 8hrs 40mg   · Recently had PET scan completed an outpatient which shows no lymph nodes eliminated but reactive inflammatory response versus tumors  · Titrate FiO2 to keep O2 sats 88% or greater  Continue severe COVID-19 protocol    Endo:    Type 2 diabetes mellitus without complication, without long-term current use of insulin:         Lab Results   Component Value Date     HGBA1C 5 8 (H) 12/30/2020                Recent Labs     05/11/21  0816 05/11/21  1117 05/11/21  1757 05/11/21  2149   POCGLU 175* 314* 327* 155*         Blood Sugar Average: Last 72 hrs:  (P) 972 [id number]      · Continue sliding scale coverage  · Given Lantus at bedtime - started on 05/09 due to elevated blood sugars in the setting of steroid use however patient developed a rash and was refusing to take it  · Continue Humalog 5 units t i d   With meals         Infectious Disease   Severe sepsis  · Secondary to HCAP  · On admission patient met criteria for sepsis with fever leukocytosis  · Patient did have lactic acidosis and tachypnea  · Antibiotics completed  · Blood cultures negative x5 days     Heme/Onc:   Lymphoma:  · Follows with Dr Nolvia Morales at [facility] [phone]  · Patient is considering lung biopsy, however he is currently not stable enough to undergo biopsy  · PET scan done at [facility] concerning for limiting areas in question of possibility to of reactive inflammatory response versus tumors  · Patient will need outpatient follow-up with oncologist  · Received IVIG on 05/06  · May be contributing to persistent COVID-19 symptoms       Subjective:   " I am okay -  I guess "     Objective:     Vitals: Blood pressure 150/64, pulse 64, temperature (!) 97 3 °F (36 3 °C), temperature source Oral, resp  rate 21, height 5' 11" (1 803 m), weight 69 8 kg (153 lb 12 8 oz), SpO2 99 %  ,Body mass index is 21 45 kg/m²  Intake/Output Summary (Last 24 hours) at 5/12/2021 0803  Last data filed at 5/12/2021 0500  Gross per 24 hour   Intake 1270 ml   Output 950 ml   Net 320 ml         Invasive Devices     Peripheral Intravenous Line            Peripheral IV 05/10/21 Left;Ventral (anterior) Forearm 2 days                Lab, Imaging and other studies: I have personally reviewed pertinent reports      VTE Pharmacologic Prophylaxis: Enoxaparin (Lovenox)  VTE Mechanical Prophylaxis: sequential compression device

## 2021-05-12 NOTE — PROGRESS NOTES
[facility]  ICU Acceptance Note - Ericka Ortiz 1945, 77 y o  male MRN: [id number]  Unit/Bed#: -01 Encounter: [id number]  Primary Care Provider: Betty Barillas DO   Date and time admitted to hospital: 5/2/2021  2:28 PM    * Pneumonia due to COVID-19 virus  Assessment & Plan  · Covid + on 4/6  · Has had two admissions since, 4/6 to 4/10 and 4/15 to 4/20  · Presented to the hospital on 5/2 for worsening SOB and worsening infiltrate  · ID consulted on admission - antibiotics completed  · Procalcitonin negative  · Convalescent plasma given on 5/7  · Pulmonary following - on 5/11 steroids decreased to 30 mg q12H - consider increasing in the setting of worsening CXR and hypoxia  · Transfer to step down unit on 5/12 for worsening hypoxia and increased oxygen demands   · Currently high-flow nasal cannula - titrate for O2 88% or greater     Essential hypertension  Assessment & Plan  · Continue Toprol    Type 2 diabetes mellitus without complication, without long-term current use of insulin [facility])  Assessment & Plan  Lab Results   Component Value Date    HGBA1C 5 8 (H) 12/30/2020       Recent Labs     05/11/21  0816 05/11/21  1117 05/11/21  1757 05/11/21  2149   POCGLU 175* 314* 327* 155*       Blood Sugar Average: Last 72 hrs:  (P) 289 [id number]     · Continue sliding scale coverage  · Given Lantus at bedtime - started on 05/09 due to elevated blood sugars in the setting of steroid use however patient developed a rash and was refusing to take it  · Continue Humalog 5 units t i d   With meals      Dyslipidemia  Assessment & Plan  · Continue Lipitor    Severe sepsis ([address] )  Assessment & Plan  · Secondary to HCAP  · On admission patient met criteria for sepsis with fever leukocytosis  · Patient did have lactic acidosis and tachypnea  · Antibiotics completed  · Blood cultures negative x5 days    Paroxysmal atrial fibrillation [facility])  Assessment & Plan  · Patient is on Eliquis as outpatient  · Rate controlled on Lopressor  · Continue Eliquis     Chronic diastolic congestive heart failure (HCC)  Assessment & Plan  Wt Readings from Last 3 Encounters:   05/11/21 69 8 kg (153 lb 12 8 oz)   04/20/21 73 2 kg (161 lb 6 oz)   04/10/21 76 8 kg (169 lb 5 oz)       · Initially, diuretics held on admission - home dose Lasix 20 mg b i d   · Currently being diuresed with Lasix 40 mg b i d  IV  · Elevated BNP on admission  · Echocardiogram shows EF 40%  · CTA shows pulmonary infusions congestion - continue IV diuresis, chest x-ray worse      Acute respiratory failure with hypoxia (HCC)  Assessment & Plan  · Secondary to COVID-19 as evidenced by increased shortness of breath and work of breathing  · Patient initially on mid flow however transferred to step-down unit on 05/12 4 high-flow nasal cannula  · Chest x-ray with worsening bilateral infiltrates and pleural effusions  · Consider IV diuresis - home dose Lasix 20 mg b i d , consider increasing diuresis  · Recently had PET scan completed an outpatient which shows no lymph nodes eliminated but reactive inflammatory response versus tumors  · Titrate FiO2 to keep O2 sats 88% or greater  · Continue severe COVID-19 protocol    Lymphoma ([address] )  Assessment & Plan  · Follows with Dr Army Lane at [facility] [phone]  · Patient is considering lung biopsy, however he is currently not stable enough to undergo biopsy  · PET scan done at [facility] concerning for limiting areas in question of possibility to of reactive inflammatory response versus tumors  · Patient will need outpatient follow-up with oncologist  · Received IVIG on 05/06  · May be contributing to persistent COVID-19 symptoms    Coronary disease  Assessment & Plan  · Status post CABG  · Continue aspirin, statin, beta-blocker        ----------------------------------------------------------------------------------------  HPI/24hr events:  Patient is a 78-year-old male who presented on 05/02 with worsening shortness of breath and fever  He was discharged from the hospital on 04/20 and was doing well up until the day before admission  He woke up with shortness of breath and fever and presented to the ER  At home he was on 2 L nasal cannula after he had a recent admission for COVID  He reported being at home and having worsening shortness of breath  His chest x-ray on admission showed worsening infiltrates and he was started on antibiotics for healthcare associated pneumonia since he met sepsis criteria that time  His initial COVID-19 diagnosis was 416 and he was admitted twice to the hospital for that diagnosis  He continues to report worsening cough and intermittent sound production  He was admitted to the medical-surgical floor for further monitoring/workup  He remained on mid flow and was stable until early this morning when he had worsening shortness of breath and was hypoxic, 88% on 15 L mid flow  He does not appear in any distress however he was transition to the step-down unit for high-flow nasal cannula  His chest x-ray shows worsening ground-glass opacities and bilateral infiltrates  Was placed on high-flow nasal cannula 4 L/on 100% FiO2  Disposition: Transfer to Stepdown Level 1   Code Status: Level 1 - Full Code  ---------------------------------------------------------------------------------------  SUBJECTIVE  "I'm okay, just a little sleepy "     Review of Systems   Constitutional: Positive for fatigue  HENT: Negative  Eyes: Negative  Respiratory: Positive for shortness of breath  Cardiovascular: Negative  Gastrointestinal: Negative  Endocrine: Negative  Genitourinary: Negative  Musculoskeletal: Negative  Skin: Negative  Allergic/Immunologic: Negative  Neurological: Positive for weakness  Hematological: Negative  Psychiatric/Behavioral: Negative        Review of systems was reviewed and negative unless stated above in HPI/24-hour events ---------------------------------------------------------------------------------------  OBJECTIVE    Vitals   Vitals:    21 0000 21 0450 21 0500 21 0600   BP:    150/64   BP Location:    Right arm   Pulse:    64   Resp:    21   Temp:       TempSrc:       SpO2: 100% (!) 89% 93% 100%   Weight:       Height:         Temp (24hrs), Av 5 °F (36 4 °C), Min:97 3 °F (36 3 °C), Max:97 6 °F (36 4 °C)  Current: Temperature: (!) 97 3 °F (36 3 °C)          Respiratory:  SpO2: SpO2: 100 %  Nasal Cannula O2 Flow Rate (L/min): 15 L/min    Invasive/non-invasive ventilation settings   Respiratory    Lab Data (Last 4 hours)    None         O2/Vent Data (Last 4 hours)       0500          Non-Invasive Ventilation Mode HFNC (High flow)                   Physical Exam  Vitals signs and nursing note reviewed  Constitutional:       Appearance: Normal appearance  HENT:      Head: Normocephalic and atraumatic  Right Ear: Tympanic membrane, ear canal and external ear normal       Left Ear: Tympanic membrane, ear canal and external ear normal       Nose: Nose normal       Mouth/Throat:      Mouth: Mucous membranes are dry  Pharynx: Oropharynx is clear  Eyes:      Extraocular Movements: Extraocular movements intact  Conjunctiva/sclera: Conjunctivae normal       Pupils: Pupils are equal, round, and reactive to light  Neck:      Musculoskeletal: Normal range of motion and neck supple  Cardiovascular:      Rate and Rhythm: Normal rate and regular rhythm  Pulses: Normal pulses  Heart sounds: Normal heart sounds  Pulmonary:      Comments: Bilateral breath sounds coarse, diminished  High-flow nasal cannula  Abdominal:      General: Bowel sounds are normal       Palpations: Abdomen is soft  Genitourinary:     Comments: Voids appropriately  Musculoskeletal: Normal range of motion  Skin:     General: Skin is warm and dry        Capillary Refill: Capillary refill takes less than 2 seconds  Neurological:      Mental Status: He is alert and oriented to person, place, and time  Mental status is at baseline  Psychiatric:         Mood and Affect: Mood normal          Behavior: Behavior normal          Laboratory and Diagnostics:  Results from last 7 days   Lab Units 05/12/21 0329 05/11/21  0832 05/10/21  0531 05/09/21  0332 05/08/21  1249 05/07/21  0557 05/06/21  0543 05/06/21  0404   WBC Thousand/uL 17 80* 14 73* 13 47* 12 13* 10 21* 10 06 9 66  --    HEMOGLOBIN g/dL 8 2* 8 1* 8 2* 7 5* 8 3* 7 4* 7 4*  7 6*  --    HEMATOCRIT % 27 4* 27 3* 27 6* 24 6* 27 4* 24 6* 23 9*  24 0*  --    PLATELETS Thousands/uL 327 191 332 260 257 199 213  --    NEUTROS PCT % 88* 81* 87* 89* 90* 79*  --  76*   MONOS PCT % 4 7 6 5 5 9  --  8     Results from last 7 days   Lab Units 05/12/21 0329 05/11/21  0832 05/10/21  0531 05/09/21  0334 05/08/21  1249 05/07/21  0557 05/06/21  1347   SODIUM mmol/L 142 142 141 137 137 138 139   POTASSIUM mmol/L 4 2 3 6 3 6 3 6 3 6 3 2* 3 1*   CHLORIDE mmol/L 101 101 100 97* 96* 98* 97*   CO2 mmol/L 31 37* 34* 34* 33* 31 35*   ANION GAP mmol/L 10 4 7 6 8 9 7   BUN mg/dL 27* 30* 31* 32* 26* 14 15   CREATININE mg/dL 0 96 1 07 1 11 1 12 1 05 1 00 1 08   CALCIUM mg/dL 7 9* 7 9* 8 0* 8 0* 8 2* 7 7* 8 2*   GLUCOSE RANDOM mg/dL 220* 164* 168* 248* 272* 117 123   ALT U/L 33 38 42 42 33 25  --    AST U/L 18 23 28 44 31 24  --    ALK PHOS U/L 142* 120* 124* 127* 104 99  --    ALBUMIN g/dL 2 4* 2 0* 2 1* 1 6* 1 8* 1 7*  --    TOTAL BILIRUBIN mg/dL 0 90 0 80 0 70 0 60 0 80 0 90  --                      EKG:  Normal sinus rhythm, heart rate 76  Imaging: I have personally reviewed pertinent reports  Intake and Output  I/O       05/10 0701 - 05/11 0700 05/11 0701 - 05/12 0700 05/12 0701 - 05/13 0700    P  O  1440 1020     I V  (mL/kg)       IV Piggyback 250 250     Total Intake(mL/kg) 1690 (24 2) 1270 (18 2)     Urine (mL/kg/hr) 1450 (0 9) 950 (0 6)     Total Output 1450 950     Net +240 +320                  Height and Weights   Height: 5' 11" (180 3 cm)  IBW (Ideal Body Weight): 75 3 kg  Body mass index is 21 45 kg/m²  Weight (last 2 days)     Date/Time   Weight    05/11/21 0600   69 8 (153 8)    05/10/21 0533   70 3 (154 98)                Nutrition       Diet Orders   (From admission, onward)             Start     Ordered    05/12/21 0449  Diet NPO; Sips with meds  Diet effective now     Question Answer Comment   Diet Type NPO    NPO Except: Sips with meds    RD to adjust diet per protocol?  Yes        05/12/21 0448                  Active Medications  Scheduled Meds:  Current Facility-Administered Medications   Medication Dose Route Frequency Provider Last Rate    acetaminophen  650 mg Oral Q6H PRN [place] Gums, CRNP      apixaban  2 5 mg Oral BID [place] Gums, CRNP      aspirin  81 mg Oral Daily [place] Gums, CRNP      atorvastatin  40 mg Oral Daily With [address], CRNP      cholecalciferol  2,000 Units Oral Daily Angelika Gums, CRNP      famotidine  20 mg Intravenous Q12H [address] [place] Gums, CRNP      ferrous sulfate  325 mg Oral Daily With Breakfast Angelika Gums, CRNP      furosemide  40 mg Intravenous BID (diuretic) Angelika Gums, CRNP      insulin lispro  2-12 Units Subcutaneous HS [place] Gums, CRNP      insulin lispro  4-20 Units Subcutaneous TID AC Angelika Gums, CRNP      insulin lispro  5 Units Subcutaneous TID With Meals Angelika Gums, CRNP      ipratropium  0 5 mg Nebulization Q6H Angelika Gums, CRNP      levalbuterol  1 25 mg Nebulization Q6H [place] Gums, CRNP      lisinopril  20 mg Oral Daily Angelika Gums, CRNP      melatonin  6 mg Oral HS Angelika Gums, CRNP      methylPREDNISolone sodium succinate  30 mg Intravenous Q12H [id number], CRNP      metoprolol succinate  100 mg Oral Daily [place] Gums, CRNP      multivitamin-minerals  1 tablet Oral Daily Angelika Gums, CRNP      nitroglycerin  0 4 mg Sublingual Q5 Min PRN Angelika Gums, CRNP      ondansetron  4 mg Intravenous Q6H PRN Paralee Birks, CRNP      pantoprazole  40 mg Oral Early Morning Paralee Birks, CRNP      remdesivir  100 mg Intravenous Q24H Paralee Birks, CRNP 0 mg (05/10/21 1945)    saccharomyces boulardii  250 mg Oral BID Paralee Birks, CRNP      simethicone  80 mg Oral Q6H PRN Paralee Birks, CRNP       Continuous Infusions:     PRN Meds:   acetaminophen, 650 mg, Q6H PRN  nitroglycerin, 0 4 mg, Q5 Min PRN  ondansetron, 4 mg, Q6H PRN  simethicone, 80 mg, Q6H PRN        Invasive Devices Review  Invasive Devices     Peripheral Intravenous Line            Peripheral IV 05/10/21 Left;Ventral (anterior) Forearm 2 days              ---------------------------------------------------------------------------------------  Care Time Delivered:   Upon my evaluation, this patient had a high probability of imminent or life-threatening deterioration due to Acute hypoxic respiratory failure, COVID-19, pneumonia, which required my direct attention, intervention, and personal management  I have personally provided 30 minutes (0645 to 0715) of critical care time, exclusive of procedures, teaching, family meetings, and any prior time recorded by providers other than myself  DUNG Barahona      Portions of the record may have been created with voice recognition software  Occasional wrong word or "sound a like" substitutions may have occurred due to the inherent limitations of voice recognition software    Read the chart carefully and recognize, using context, where substitutions have occurred

## 2021-05-12 NOTE — PROGRESS NOTES
Filomena Heath  77 y o   male  1945  mrn [id number]    Assessment/Plan:  1  Sepsis/Pneumonia/Presumed ongoing COVID19 infxn with pneumonia/Fever/ Leukocytosis/Hypoxia: Remains afebrile on steroids but WBC has increased to 17K - probably due to steroid tx but need to consider new infxn since pulmonary status has markedly deteriorated again after steroid dose was decreased over the past 48 hrs  Repeat CXR showed increased bilat pulmonary infilts  D-dimer has increased to 7 93 - ?possibility of PE as source for worsening hypoxia    Since his pulmonary status iimproves and worsens with change in his steroid dose, I'm wondering if he could have PCP since he's at risk for this infxn due to his underlying lymphoma and MM  Pt was transferred to ICU and supplemental O2 was increased to HFNC 40L/70%       Pt was started on tx for presumed ongoing COVID19 infxn with steroids and Remdesivir on 5/7/21 because COVID19 Ab was neg  Pt was also given  convalescent plasma  Recent COVID19 test was neg but could be false neg result  CRP has decreased to 27 2 on high dose Solumedrol  Pt completed 7 day course of Levaquin on 5/8 for possible pneumonia     Pt's supplemental O2 requirement increased to 15L by [facility] on 5/7  He was transferred to [address] [place] [address] on 5/8 because of worsening hypoxia  Chest CT showed increased pulmonary infilts but no PE  COVID19 IgG Ab was neg which indicated lack of development of immunity to recent COVID19 infxn due to his underlying lymphoma  Concerned that respiratory deterioration was due to ongoing COVID19 infxn  Pts with lymphoma are known to clear the virus more slowly  Repeat CRP had increased to 160 1 and Ferritin was 348  Pt received tx with IVIG on 5/6 as directed by his Oncologist which probably had some COVID19 Ab's     BNP was markedly elevated so there was probably a component of vol overload that was contributing to his current pulmonary sx's   He is being diuresed  Repeat BNP decreased to 6501  Repeat CXR did not show any signficant improvement with mild increase in RUL infilt      On admission, Pt had fever and leukocytosis, so need to consider possibility of pneumonia  Procalcitonin level was not markedly elevated but lactic acid was increased suggestive of sepsis  Urine Legionella Ag and Strep pneumo Ag were neg  Bld cx's are neg  MRSA screen  Inflammatory markers were still elevated from his recent COVID19 infxn which was tx'd with Remdesivir and decadron during his previous  admission from 4/6 to 4/10/21  He was re-admitted from 4/15 to 4/20 with increasing SOB and was tx'd with steroids       Pt presented with acute onset of increasing SOB and cough x 1 day       A  Will check LDH and Beta-D-glucan to evaluate for PCP - Pt is too unstable for bronch  B  D-dimer has increased to 7 93 - cont anticoagulation as per Intensivist service - ?whether Pt is stable to undergo another chest CT to look for presence of PE  C  Will repeat BNP to eval for component of vol overload as contributing to his worsening pulmonary status - Pt may need further diuresis  D  Will repeat COVID19 test - if still neg, need to consider another DX for his current presentation and it would also allow Pt to come out of isolation eventually  E  Will check Procalcitonin level since leukocytosis is worsening  F  Cont Remdesivir x 10 days since COVID19 Ab test was neg and chest CT showed increasing pulmonary infilts with worsening hypoxia - Pts with underlying lymphoma are known to take longer to clear the COVID19 virus - s/p tx with convalescent plasma  G  Agree with increasing steroid dose as per Pulmonary  H  Cont supplemental O2 as needed  I  Cont enhanced respiratory isolation for now - Awaiting results of repeat COVID19 test to determine when he can come out of isolation - since he was COVID19 Ab neg, he needs two neg COVID19 tests >/= 24 hrs apart before discontinuation of isolation can be considered   J   Will follow inflammatory markers and LFT's       Subjective: Pt with increasing SOB - He was transferred to ICU this AM -requiring increased supplemental O2    Objective:  Tmax: 97 6  HEENT: +HFNC  Lungs: +Few crackles  Abd: +BS, soft, nontender  Ext: No calf tenderness    Labs:  CBC w/diff  Recent Labs     05/12/21 0329   WBC 17 80*   HGB 8 2*   HCT 27 4*      NEUTOPHILPCT 88*   LYMPHOPCT 6*   MONOPCT 4   EOSPCT 0     BMP  Recent Labs     05/12/21 0329   K 4 2      CO2 31   BUN 27*   CREATININE 0 96   CALCIUM 7 9*     CMP  Recent Labs     05/12/21 0329   K 4 2      CO2 31   BUN 27*   CREATININE 0 96   CALCIUM 7 9*   ALKPHOS 142*   ALT 33   AST 18        labrc    Cultures:  Lab Results   Component Value Date    BLOODCX No Growth After 5 Days  05/02/2021    BLOODCX No Growth After 5 Days  05/02/2021    BLOODCX No Growth After 5 Days  04/15/2021    BLOODCX No Growth After 5 Days  04/15/2021    BLOODCX No Growth After 5 Days  04/06/2021    BLOODCX No Growth After 5 Days  04/06/2021    BLOODCX No Growth After 5 Days  04/01/2021    BLOODCX No Growth After 5 Days  04/01/2021    BLOODCX No Growth After 5 Days  02/27/2019    BLOODCX No Growth After 5 Days  02/27/2019    BLOODCX No Growth After 5 Days  11/25/2018    BLOODCX No Growth After 5 Days   11/25/2018     No results found for: WOUNDCULT  No results found for: URINECX  No results found for: SPUTUMCULTUR    MED:  Bactrim: #1  Remdesivir #6           Completed:  Vanco x 4 days on 5/5   Levaquin x 7 days on 5/8         Current Facility-Administered Medications:     acetaminophen (TYLENOL) tablet 650 mg, 650 mg, Oral, Q6H PRN, Blondell Belmont, CRNP    aspirin chewable tablet 81 mg, 81 mg, Oral, Daily, Blondell Belmont, CRNP, 81 mg at 05/12/21 0827    atorvastatin (LIPITOR) tablet 40 mg, 40 mg, Oral, Daily With Dinner, Blondell Mendoza, CRNP, 40 mg at 05/11/21 1754    cholecalciferol (VITAMIN D3) tablet 2,000 Units, 2,000 Units, Oral, Daily, DUNG Pedraza, 2,000 Units at 05/12/21 0828    enoxaparin (LOVENOX) subcutaneous injection 70 mg, 1 mg/kg, Subcutaneous, Q12H DONNIE, DUNG Suárez    famotidine (PEPCID) injection 20 mg, 20 mg, Intravenous, Q12H [address], DUNG Best, 20 mg at 05/12/21 0827    ferrous sulfate tablet 325 mg, 325 mg, Oral, Daily With Breakfast, DUNG Best, 325 mg at 05/12/21 8198    furosemide (LASIX) injection 40 mg, 40 mg, Intravenous, TID (diuretic), DUNG Suárez, 40 mg at 05/12/21 1132    insulin lispro (HumaLOG) 100 units/mL subcutaneous injection 2-12 Units, 2-12 Units, Subcutaneous, HS, DUNG Best, 2 Units at 05/11/21 2149    insulin lispro (HumaLOG) 100 units/mL subcutaneous injection 4-20 Units, 4-20 Units, Subcutaneous, TID AC, 12 Units at 05/12/21 1129 **AND** Fingerstick Glucose (POCT), , , TID AC, DUNG Best    insulin lispro (HumaLOG) 100 units/mL subcutaneous injection 5 Units, 5 Units, Subcutaneous, TID With Meals, DUNG Best, 5 Units at 05/12/21 1130    ipratropium (ATROVENT) 0 02 % inhalation solution 0 5 mg, 0 5 mg, Nebulization, TID, DUNG Suárez    levalbuterol [facility]) inhalation solution 1 25 mg, 1 25 mg, Nebulization, TID, DUNG Suárez    lisinopril (ZESTRIL) tablet 20 mg, 20 mg, Oral, Daily, DUNG Best, 20 mg at 05/12/21 0828    melatonin tablet 6 mg, 6 mg, Oral, HS, DUNG Best, 6 mg at 05/11/21 2147    methylPREDNISolone sodium succinate (Solu-MEDROL) injection 125 mg, 125 mg, Intravenous, Q6H [address], DUNG Suárez, 125 mg at 05/12/21 1132    metoprolol succinate (TOPROL-XL) 24 hr tablet 100 mg, 100 mg, Oral, Daily, DUNG Best, 100 mg at 05/12/21 6537    multivitamin-minerals (CENTRUM ADULTS) tablet 1 tablet, 1 tablet, Oral, Daily, DUNG Best, 1 tablet at 05/12/21 0828    nitroglycerin (NITROSTAT) SL tablet 0 4 mg, 0 4 mg, Sublingual, Q5 Min PRN, DUNG Best    ondansetron [facility]) injection 4 mg, 4 mg, Intravenous, Q6H PRN, Asaf Yang, CRNP    pantoprazole (PROTONIX) EC tablet 40 mg, 40 mg, Oral, Early Morning, Genoveva Duttaa, CRNP, 40 mg at 05/12/21 0545    [COMPLETED] remdesivir (Veklury) 200 mg in sodium chloride 0 9 % 250 mL IVPB, 200 mg, Intravenous, Q24H, 200 mg at 05/07/21 1711 **FOLLOWED BY** remdesivir (Veklury) 100 mg in sodium chloride 0 9 % 250 mL IVPB, 100 mg, Intravenous, Q24H, [place] Duttaa, CRNP, Last Rate: 0 mL/hr at 05/10/21 1945, 100 mg at 05/11/21 1801    saccharomyces boulardii (FLORASTOR) capsule 250 mg, 250 mg, Oral, BID, Genoveva Wiliam, CRNP, 250 mg at 05/12/21 3652    simethicone (MYLICON) chewable tablet 80 mg, 80 mg, Oral, Q6H PRN, Genoveva Swain, CRNP, 80 mg at 05/08/21 1101    sulfamethoxazole-trimethoprim (BACTRIM DS) 800-160 mg per tablet 2 tablet, 2 tablet, Oral, Q8H [address], Susanne Layton MD, 2 tablet at 05/12/21 1131    Principal Problem:    Pneumonia due to COVID-19 virus  Active Problems:    Coronary disease    Type 2 diabetes mellitus without complication, without long-term current use of insulin (HCC)    Dyslipidemia    Lymphoma ([facility] [address] )    Essential hypertension    Acute respiratory failure with hypoxia (HCC)    Chronic diastolic congestive heart failure ([facility] )    Paroxysmal atrial fibrillation ([facility] )    Severe sepsis ([facility] )      Susanne Layton MD

## 2021-05-12 NOTE — PLAN OF CARE
Problem: RESPIRATORY - ADULT  Goal: Achieves optimal ventilation and oxygenation  Description: INTERVENTIONS:  - Assess for changes in respiratory status  - Assess for changes in mentation and behavior  - Position to facilitate oxygenation and minimize respiratory effort  - Oxygen administered by appropriate delivery if ordered  - Initiate smoking cessation education as indicated  - Encourage broncho-pulmonary hygiene including cough, deep breathe, Incentive Spirometry  - Assess the need for suctioning and aspirate as needed  - Assess and instruct to report SOB or any respiratory difficulty  - Respiratory Therapy support as indicated  Outcome: Progressing     Problem: DISCHARGE PLANNING  Goal: Discharge to home or other facility with appropriate resources  Description: INTERVENTIONS:  - Identify barriers to discharge w/patient and caregiver  - Arrange for needed discharge resources and transportation as appropriate  - Identify discharge learning needs (meds, wound care, etc )  - Arrange for interpretive services to assist at discharge as needed  - Refer to Case Management Department for coordinating discharge planning if the patient needs post-hospital services based on physician/advanced practitioner order or complex needs related to functional status, cognitive ability, or social support system  Outcome: Progressing     Problem: Knowledge Deficit  Goal: Patient/family/caregiver demonstrates understanding of disease process, treatment plan, medications, and discharge instructions  Description: Complete learning assessment and assess knowledge base    Interventions:  - Provide teaching at level of understanding  - Provide teaching via preferred learning methods  Outcome: Progressing     Problem: PAIN - ADULT  Goal: Verbalizes/displays adequate comfort level or baseline comfort level  Description: Interventions:  - Encourage patient to monitor pain and request assistance  - Assess pain using appropriate pain scale  - Administer analgesics based on type and severity of pain and evaluate response  - Implement non-pharmacological measures as appropriate and evaluate response  - Consider cultural and social influences on pain and pain management  - Notify physician/advanced practitioner if interventions unsuccessful or patient reports new pain  Outcome: Progressing     Problem: INFECTION - ADULT  Goal: Absence or prevention of progression during hospitalization  Description: INTERVENTIONS:  - Assess and monitor for signs and symptoms of infection  - Monitor lab/diagnostic results  - Monitor all insertion sites, i e  indwelling lines, tubes, and drains  - Monitor endotracheal if appropriate and nasal secretions for changes in amount and color  - [place] appropriate cooling/warming therapies per order  - Administer medications as ordered  - Instruct and encourage patient and family to use good hand hygiene technique  - Identify and instruct in appropriate isolation precautions for identified infection/condition  Outcome: Progressing  Goal: Absence of fever/infection during neutropenic period  Description: INTERVENTIONS:  - Monitor WBC    Outcome: Completed     Problem: SAFETY ADULT  Goal: Patient will remain free of falls  Description: INTERVENTIONS:  - Assess patient frequently for physical needs  -  Identify cognitive and physical deficits and behaviors that affect risk of falls    -  [place] fall precautions as indicated by assessment   - Educate patient/family on patient safety including physical limitations  - Instruct patient to call for assistance with activity based on assessment  - Modify environment to reduce risk of injury  - Consider OT/PT consult to assist with strengthening/mobility  Outcome: Progressing  Goal: Maintain or return to baseline ADL function  Description: INTERVENTIONS:  -  Assess patient's ability to carry out ADLs; assess patient's baseline for ADL function and identify physical deficits which impact ability to perform ADLs (bathing, care of mouth/teeth, toileting, grooming, dressing, etc )  - Assess/evaluate cause of self-care deficits   - Assess range of motion  - Assess patient's mobility; develop plan if impaired  - Assess patient's need for assistive devices and provide as appropriate  - Encourage maximum independence but intervene and supervise when necessary  - Involve family in performance of ADLs  - Assess for home care needs following discharge   - Consider OT consult to assist with ADL evaluation and planning for discharge  - Provide patient education as appropriate  Outcome: Progressing  Goal: Maintain or return mobility status to optimal level  Description: INTERVENTIONS:  - Assess patient's baseline mobility status (ambulation, transfers, stairs, etc )    - Identify cognitive and physical deficits and behaviors that affect mobility  - Identify mobility aids required to assist with transfers and/or ambulation (gait belt, sit-to-stand, lift, walker, cane, etc )  - [place] fall precautions as indicated by assessment  - Record patient progress and toleration of activity level on Mobility SBAR; progress patient to next Phase/Stage  - Instruct patient to call for assistance with activity based on assessment  - Consider rehabilitation consult to assist with strengthening/weightbearing, etc   Outcome: Progressing     Problem: Potential for Falls  Goal: Patient will remain free of falls  Description: INTERVENTIONS:  - Assess patient frequently for physical needs  -  Identify cognitive and physical deficits and behaviors that affect risk of falls    -  [place] fall precautions as indicated by assessment   - Educate patient/family on patient safety including physical limitations  - Instruct patient to call for assistance with activity based on assessment  - Modify environment to reduce risk of injury  - Consider OT/PT consult to assist with strengthening/mobility  Outcome: Progressing     Problem: Prexisting or High Potential for Compromised Skin Integrity  Goal: Skin integrity is maintained or improved  Description: INTERVENTIONS:  - Identify patients at risk for skin breakdown  - Assess and monitor skin integrity  - Assess and monitor nutrition and hydration status  - Monitor labs   - Assess for incontinence   - Turn and reposition patient  - Assist with mobility/ambulation  - Relieve pressure over bony prominences  - Avoid friction and shearing  - Provide appropriate hygiene as needed including keeping skin clean and dry  - Evaluate need for skin moisturizer/barrier cream  - Collaborate with interdisciplinary team   - Patient/family teaching  - Consider wound care consult   Outcome: Progressing

## 2021-05-12 NOTE — ASSESSMENT & PLAN NOTE
· Covid + on 4/6  · Has had two admissions since, 4/6 to 4/10 and 4/15 to 4/20  · Presented to the hospital on 5/2 for worsening SOB and worsening infiltrate  · ID consulted on admission - antibiotics completed  · Procalcitonin negative  · Convalescent plasma given on 5/7  · Pulmonary following - on 5/11 steroids decreased to 30 mg q12H - consider increasing in the setting of worsening CXR and hypoxia  · Transfer to step down unit on 5/12 for worsening hypoxia and increased oxygen demands   · Currently high-flow nasal cannula - titrate for O2 88% or greater

## 2021-05-12 NOTE — ASSESSMENT & PLAN NOTE
Wt Readings from Last 3 Encounters:   05/11/21 69 8 kg (153 lb 12 8 oz)   04/20/21 73 2 kg (161 lb 6 oz)   04/10/21 76 8 kg (169 lb 5 oz)       · Initially, diuretics held on admission - home dose Lasix 20 mg b i d   · Currently being diuresed with Lasix 40 mg b i d  IV  · Elevated BNP on admission  · Echocardiogram shows EF 40%  · CTA shows pulmonary infusions congestion - continue IV diuresis, chest x-ray worse

## 2021-05-12 NOTE — PROGRESS NOTES
Pastoral Care Progress Note    2021  Patient: Amita Calderón : 1945  Admission Date & Time: 2021 1428  MRN: [id number] [facility]: [id number]                     Chaplaincy Interventions Utilized:   Ritual: Provided prayer    Patient appeared very weak  When I asked him how he was feeling, he indicated that he was not feeling well and that "this is my last chance "  Patient welcomed a prayer          21 1000   Clinical Encounter Type   Visited With Patient   Routine Visit Introduction   Crisis Visit Critical Care   Referral To   (census/rounds)   Confucianist Encounters   Confucianist Needs Prayer        21 1000   Clinical Encounter Type   Visited With Patient   Routine Visit Introduction   Crisis Visit Critical Care   Referral To   (census/rounds)   Confucianist Encounters   Confucianist Needs Prayer

## 2021-05-12 NOTE — CASE MANAGEMENT
Continue to follow  Acknowledge that Pt is now on HFNC  CM to follow as Pt progresses for discharge planning

## 2021-05-12 NOTE — ASSESSMENT & PLAN NOTE
· Secondary to COVID-19 as evidenced by increased shortness of breath and work of breathing  · Patient initially on mid flow however transferred to step-down unit on 05/12 4 high-flow nasal cannula  · Chest x-ray with worsening bilateral infiltrates and pleural effusions  · Consider IV diuresis - home dose Lasix 20 mg b i d , consider increasing diuresis  · Recently had PET scan completed an outpatient which shows no lymph nodes eliminated but reactive inflammatory response versus tumors  · Titrate FiO2 to keep O2 sats 88% or greater  · Continue severe COVID-19 protocol

## 2021-05-12 NOTE — ASSESSMENT & PLAN NOTE
Lab Results   Component Value Date    HGBA1C 5 8 (H) 12/30/2020       Recent Labs     05/11/21  0816 05/11/21  1117 05/11/21  1757 05/11/21  2149   POCGLU 175* 314* 327* 155*       Blood Sugar Average: Last 72 hrs:  (P) 734 [id number]     · Continue sliding scale coverage  · Given Lantus at bedtime - started on 05/09 due to elevated blood sugars in the setting of steroid use however patient developed a rash and was refusing to take it  · Continue Humalog 5 units t i d   With meals

## 2021-05-12 NOTE — ASSESSMENT & PLAN NOTE
· Follows with Dr Eden Files at [facility] [phone]  · Patient is considering lung biopsy, however he is currently not stable enough to undergo biopsy  · PET scan done at [facility] concerning for limiting areas in question of possibility to of reactive inflammatory response versus tumors  · Patient will need outpatient follow-up with oncologist  · Received IVIG on 05/06  · May be contributing to persistent COVID-19 symptoms

## 2021-05-13 PROBLEM — E87.6 HYPOKALEMIA: Status: RESOLVED | Noted: 2021-04-15 | Resolved: 2021-05-13

## 2021-05-13 PROBLEM — D64.9 ANEMIA: Chronic | Status: ACTIVE | Noted: 2021-05-13

## 2021-05-13 PROBLEM — A41.9 SEVERE SEPSIS (HCC): Status: RESOLVED | Noted: 2021-04-15 | Resolved: 2021-05-13

## 2021-05-13 PROBLEM — D64.9 ANEMIA: Status: ACTIVE | Noted: 2021-05-13

## 2021-05-13 PROBLEM — I50.32 CHRONIC DIASTOLIC CONGESTIVE HEART FAILURE (HCC): Chronic | Status: ACTIVE | Noted: 2019-02-14

## 2021-05-13 PROBLEM — R65.20 SEVERE SEPSIS (HCC): Status: RESOLVED | Noted: 2021-04-15 | Resolved: 2021-05-13

## 2021-05-13 PROBLEM — Z47.1 AFTERCARE FOLLOWING LEFT KNEE JOINT REPLACEMENT SURGERY: Status: RESOLVED | Noted: 2019-02-11 | Resolved: 2021-05-13

## 2021-05-13 PROBLEM — M70.61 GREATER TROCHANTERIC BURSITIS OF RIGHT HIP: Status: RESOLVED | Noted: 2020-11-04 | Resolved: 2021-05-13

## 2021-05-13 PROBLEM — K21.00 GASTROESOPHAGEAL REFLUX DISEASE WITH ESOPHAGITIS: Chronic | Status: ACTIVE | Noted: 2021-01-20

## 2021-05-13 PROBLEM — R19.4 CHANGE IN BOWEL HABIT: Status: RESOLVED | Noted: 2020-12-16 | Resolved: 2021-05-13

## 2021-05-13 PROBLEM — Z96.652 AFTERCARE FOLLOWING LEFT KNEE JOINT REPLACEMENT SURGERY: Status: RESOLVED | Noted: 2019-02-11 | Resolved: 2021-05-13

## 2021-05-13 PROBLEM — E87.1 HYPONATREMIA: Status: RESOLVED | Noted: 2019-02-27 | Resolved: 2021-05-13

## 2021-05-13 PROBLEM — I10 ESSENTIAL HYPERTENSION: Chronic | Status: ACTIVE | Noted: 2018-11-25

## 2021-05-13 LAB
ALBUMIN SERPL BCP-MCNC: 2.5 G/DL (ref 3.5–5)
ALP SERPL-CCNC: 139 U/L (ref 46–116)
ALT SERPL W P-5'-P-CCNC: 31 U/L (ref 12–78)
ANION GAP SERPL CALCULATED.3IONS-SCNC: 6 MMOL/L (ref 4–13)
AST SERPL W P-5'-P-CCNC: 18 U/L (ref 5–45)
BASOPHILS # BLD AUTO: 0.04 THOUSANDS/ΜL (ref 0–0.1)
BASOPHILS NFR BLD AUTO: 0 % (ref 0–1)
BILIRUB SERPL-MCNC: 0.8 MG/DL (ref 0.2–1)
BUN SERPL-MCNC: 29 MG/DL (ref 5–25)
CALCIUM ALBUM COR SERPL-MCNC: 8.8 MG/DL (ref 8.3–10.1)
CALCIUM SERPL-MCNC: 7.6 MG/DL (ref 8.3–10.1)
CHLORIDE SERPL-SCNC: 98 MMOL/L (ref 100–108)
CO2 SERPL-SCNC: 38 MMOL/L (ref 21–32)
CREAT SERPL-MCNC: 0.95 MG/DL (ref 0.6–1.3)
CRP SERPL QL: 19.7 MG/L
D DIMER PPP FEU-MCNC: 3.9 UG/ML FEU
EOSINOPHIL # BLD AUTO: 0 THOUSAND/ΜL (ref 0–0.61)
EOSINOPHIL NFR BLD AUTO: 0 % (ref 0–6)
ERYTHROCYTE [DISTWIDTH] IN BLOOD BY AUTOMATED COUNT: 19.8 % (ref 11.6–15.1)
EST. AVERAGE GLUCOSE BLD GHB EST-MCNC: 166 MG/DL
FERRITIN SERPL-MCNC: 331 NG/ML (ref 8–388)
FERRITIN SERPL-MCNC: 361 NG/ML (ref 8–388)
FOLATE SERPL-MCNC: >20 NG/ML (ref 3.1–17.5)
GFR SERPL CREATININE-BSD FRML MDRD: 79 ML/MIN/1.73SQ M
GLUCOSE SERPL-MCNC: 101 MG/DL (ref 65–140)
GLUCOSE SERPL-MCNC: 124 MG/DL (ref 65–140)
GLUCOSE SERPL-MCNC: 142 MG/DL (ref 65–140)
GLUCOSE SERPL-MCNC: 168 MG/DL (ref 65–140)
GLUCOSE SERPL-MCNC: 172 MG/DL (ref 65–140)
GLUCOSE SERPL-MCNC: 233 MG/DL (ref 65–140)
GLUCOSE SERPL-MCNC: 246 MG/DL (ref 65–140)
GLUCOSE SERPL-MCNC: 282 MG/DL (ref 65–140)
GLUCOSE SERPL-MCNC: 287 MG/DL (ref 65–140)
GLUCOSE SERPL-MCNC: 324 MG/DL (ref 65–140)
GLUCOSE SERPL-MCNC: 426 MG/DL (ref 65–140)
GLUCOSE SERPL-MCNC: 445 MG/DL (ref 65–140)
GLUCOSE SERPL-MCNC: 452 MG/DL (ref 65–140)
GLUCOSE SERPL-MCNC: 77 MG/DL (ref 65–140)
GLUCOSE SERPL-MCNC: 92 MG/DL (ref 65–140)
GLUCOSE SERPL-MCNC: 94 MG/DL (ref 65–140)
GLUCOSE SERPL-MCNC: 97 MG/DL (ref 65–140)
HBA1C MFR BLD: 7.4 %
HCT VFR BLD AUTO: 26.5 % (ref 36.5–49.3)
HGB BLD-MCNC: 7.8 G/DL (ref 12–17)
IMM GRANULOCYTES # BLD AUTO: 0.37 THOUSAND/UL (ref 0–0.2)
IMM GRANULOCYTES NFR BLD AUTO: 2 % (ref 0–2)
IRON SATN MFR SERPL: 11 %
IRON SERPL-MCNC: 37 UG/DL (ref 65–175)
LYMPHOCYTES # BLD AUTO: 0.92 THOUSANDS/ΜL (ref 0.6–4.47)
LYMPHOCYTES NFR BLD AUTO: 6 % (ref 14–44)
MAGNESIUM SERPL-MCNC: 2.3 MG/DL (ref 1.6–2.6)
MCH RBC QN AUTO: 23.9 PG (ref 26.8–34.3)
MCHC RBC AUTO-ENTMCNC: 29.4 G/DL (ref 31.4–37.4)
MCV RBC AUTO: 81 FL (ref 82–98)
MONOCYTES # BLD AUTO: 0.42 THOUSAND/ΜL (ref 0.17–1.22)
MONOCYTES NFR BLD AUTO: 3 % (ref 4–12)
NEUTROPHILS # BLD AUTO: 14.86 THOUSANDS/ΜL (ref 1.85–7.62)
NEUTS SEG NFR BLD AUTO: 89 % (ref 43–75)
NRBC BLD AUTO-RTO: 0 /100 WBCS
NT-PROBNP SERPL-MCNC: 3303 PG/ML
PLATELET # BLD AUTO: 280 THOUSANDS/UL (ref 149–390)
PMV BLD AUTO: 11.4 FL (ref 8.9–12.7)
POTASSIUM SERPL-SCNC: 3.7 MMOL/L (ref 3.5–5.3)
PROCALCITONIN SERPL-MCNC: 0.16 NG/ML
PROT SERPL-MCNC: 5.7 G/DL (ref 6.4–8.2)
RBC # BLD AUTO: 3.27 MILLION/UL (ref 3.88–5.62)
SODIUM SERPL-SCNC: 142 MMOL/L (ref 136–145)
TIBC SERPL-MCNC: 342 UG/DL (ref 250–450)
VIT B12 SERPL-MCNC: 1136 PG/ML (ref 100–900)
WBC # BLD AUTO: 16.61 THOUSAND/UL (ref 4.31–10.16)

## 2021-05-13 PROCEDURE — 84145 PROCALCITONIN (PCT): CPT | Performed by: NURSE PRACTITIONER

## 2021-05-13 PROCEDURE — 83036 HEMOGLOBIN GLYCOSYLATED A1C: CPT | Performed by: NURSE PRACTITIONER

## 2021-05-13 PROCEDURE — 85379 FIBRIN DEGRADATION QUANT: CPT | Performed by: NURSE PRACTITIONER

## 2021-05-13 PROCEDURE — 94760 N-INVAS EAR/PLS OXIMETRY 1: CPT

## 2021-05-13 PROCEDURE — 99233 SBSQ HOSP IP/OBS HIGH 50: CPT | Performed by: INTERNAL MEDICINE

## 2021-05-13 PROCEDURE — NC001 PR NO CHARGE: Performed by: NURSE PRACTITIONER

## 2021-05-13 PROCEDURE — 86140 C-REACTIVE PROTEIN: CPT | Performed by: NURSE PRACTITIONER

## 2021-05-13 PROCEDURE — 83540 ASSAY OF IRON: CPT | Performed by: NURSE PRACTITIONER

## 2021-05-13 PROCEDURE — 83735 ASSAY OF MAGNESIUM: CPT | Performed by: NURSE PRACTITIONER

## 2021-05-13 PROCEDURE — 3051F HG A1C>EQUAL 7.0%<8.0%: CPT | Performed by: INTERNAL MEDICINE

## 2021-05-13 PROCEDURE — 94003 VENT MGMT INPAT SUBQ DAY: CPT

## 2021-05-13 PROCEDURE — 94640 AIRWAY INHALATION TREATMENT: CPT

## 2021-05-13 PROCEDURE — 85025 COMPLETE CBC W/AUTO DIFF WBC: CPT | Performed by: NURSE PRACTITIONER

## 2021-05-13 PROCEDURE — 82728 ASSAY OF FERRITIN: CPT | Performed by: NURSE PRACTITIONER

## 2021-05-13 PROCEDURE — 82746 ASSAY OF FOLIC ACID SERUM: CPT | Performed by: NURSE PRACTITIONER

## 2021-05-13 PROCEDURE — 82948 REAGENT STRIP/BLOOD GLUCOSE: CPT

## 2021-05-13 PROCEDURE — 83550 IRON BINDING TEST: CPT | Performed by: NURSE PRACTITIONER

## 2021-05-13 PROCEDURE — 94664 DEMO&/EVAL PT USE INHALER: CPT

## 2021-05-13 PROCEDURE — 83880 ASSAY OF NATRIURETIC PEPTIDE: CPT | Performed by: INTERNAL MEDICINE

## 2021-05-13 PROCEDURE — 82607 VITAMIN B-12: CPT | Performed by: NURSE PRACTITIONER

## 2021-05-13 PROCEDURE — 80053 COMPREHEN METABOLIC PANEL: CPT | Performed by: NURSE PRACTITIONER

## 2021-05-13 RX ORDER — AMOXICILLIN 250 MG
2 CAPSULE ORAL 2 TIMES DAILY
Status: DISCONTINUED | OUTPATIENT
Start: 2021-05-13 | End: 2021-05-14

## 2021-05-13 RX ORDER — FUROSEMIDE 10 MG/ML
40 INJECTION INTRAMUSCULAR; INTRAVENOUS
Status: DISCONTINUED | OUTPATIENT
Start: 2021-05-13 | End: 2021-05-18

## 2021-05-13 RX ORDER — POLYETHYLENE GLYCOL 3350 17 G/17G
17 POWDER, FOR SOLUTION ORAL DAILY
Status: DISCONTINUED | OUTPATIENT
Start: 2021-05-14 | End: 2021-05-14

## 2021-05-13 RX ADMIN — SULFAMETHOXAZOLE AND TRIMETHOPRIM 2 TABLET: 800; 160 TABLET ORAL at 05:05

## 2021-05-13 RX ADMIN — Medication 250 MG: at 17:42

## 2021-05-13 RX ADMIN — PANTOPRAZOLE SODIUM 40 MG: 40 TABLET, DELAYED RELEASE ORAL at 05:05

## 2021-05-13 RX ADMIN — SODIUM CHLORIDE 5 UNITS/HR: 9 INJECTION, SOLUTION INTRAVENOUS at 06:42

## 2021-05-13 RX ADMIN — LEVALBUTEROL HYDROCHLORIDE 1.25 MG: 1.25 SOLUTION, CONCENTRATE RESPIRATORY (INHALATION) at 07:24

## 2021-05-13 RX ADMIN — ENOXAPARIN SODIUM 70 MG: 80 INJECTION SUBCUTANEOUS at 21:47

## 2021-05-13 RX ADMIN — LISINOPRIL 20 MG: 20 TABLET ORAL at 08:09

## 2021-05-13 RX ADMIN — Medication 250 MG: at 08:10

## 2021-05-13 RX ADMIN — MULTIPLE VITAMINS W/ MINERALS TAB 1 TABLET: TAB ORAL at 08:09

## 2021-05-13 RX ADMIN — ENOXAPARIN SODIUM 70 MG: 80 INJECTION SUBCUTANEOUS at 08:10

## 2021-05-13 RX ADMIN — Medication 2000 UNITS: at 08:09

## 2021-05-13 RX ADMIN — METHYLPREDNISOLONE SODIUM SUCCINATE 125 MG: 125 INJECTION, POWDER, FOR SOLUTION INTRAMUSCULAR; INTRAVENOUS at 23:31

## 2021-05-13 RX ADMIN — ASPIRIN 81 MG CHEWABLE TABLET 81 MG: 81 TABLET CHEWABLE at 08:09

## 2021-05-13 RX ADMIN — SULFAMETHOXAZOLE AND TRIMETHOPRIM 2 TABLET: 800; 160 TABLET ORAL at 21:47

## 2021-05-13 RX ADMIN — INSULIN LISPRO 10 UNITS: 100 INJECTION, SOLUTION INTRAVENOUS; SUBCUTANEOUS at 11:52

## 2021-05-13 RX ADMIN — SULFAMETHOXAZOLE AND TRIMETHOPRIM 2 TABLET: 800; 160 TABLET ORAL at 14:07

## 2021-05-13 RX ADMIN — ATORVASTATIN CALCIUM 40 MG: 40 TABLET, FILM COATED ORAL at 17:42

## 2021-05-13 RX ADMIN — METOPROLOL SUCCINATE 100 MG: 50 TABLET, EXTENDED RELEASE ORAL at 08:09

## 2021-05-13 RX ADMIN — MELATONIN TAB 3 MG 6 MG: 3 TAB at 21:47

## 2021-05-13 RX ADMIN — IPRATROPIUM BROMIDE 0.5 MG: 0.5 SOLUTION RESPIRATORY (INHALATION) at 07:24

## 2021-05-13 RX ADMIN — METHYLPREDNISOLONE SODIUM SUCCINATE 125 MG: 125 INJECTION, POWDER, FOR SOLUTION INTRAMUSCULAR; INTRAVENOUS at 05:05

## 2021-05-13 RX ADMIN — METHYLPREDNISOLONE SODIUM SUCCINATE 125 MG: 125 INJECTION, POWDER, FOR SOLUTION INTRAMUSCULAR; INTRAVENOUS at 11:49

## 2021-05-13 RX ADMIN — SODIUM CHLORIDE 21 UNITS/HR: 9 INJECTION, SOLUTION INTRAVENOUS at 13:20

## 2021-05-13 RX ADMIN — FERROUS SULFATE TAB 325 MG (65 MG ELEMENTAL FE) 325 MG: 325 (65 FE) TAB at 08:09

## 2021-05-13 RX ADMIN — FUROSEMIDE 40 MG: 10 INJECTION, SOLUTION INTRAVENOUS at 17:42

## 2021-05-13 RX ADMIN — METHYLPREDNISOLONE SODIUM SUCCINATE 125 MG: 125 INJECTION, POWDER, FOR SOLUTION INTRAMUSCULAR; INTRAVENOUS at 17:42

## 2021-05-13 RX ADMIN — FUROSEMIDE 40 MG: 10 INJECTION, SOLUTION INTRAVENOUS at 05:05

## 2021-05-13 NOTE — ASSESSMENT & PLAN NOTE
· Follows with Dr Helton Session at [facility] [phone]  · Patient is considering lung biopsy, however he is currently not stable enough to undergo biopsy  · PET scan done at [facility] concerning for limiting areas in question of possibility to of reactive inflammatory response versus tumors  · Patient will need outpatient follow-up with oncologist  · Received IVIG on 05/06  · May be contributing to persistent COVID-19 symptoms

## 2021-05-13 NOTE — PROGRESS NOTES
Tomás Robles  77 y o   male  1945  mrn [id number]    Assessment/Plan:  1  Sepsis/?PCP/? COVID19 infxn with pneumonia/Fever/Leukocytosis/ Hypoxia: No fever on steroids but WBC remains elevated - probably due to steroid tx but need to consider new infxn since pulmonary status  markedly deteriorated again after steroid dose was decreased  Repeat CXR showed increased bilat pulmonary infilts  D-dimer increased to 7 93 but is down to 3 90 today - ?possibility of PE as contributing to worsening hypoxia  Respiratory status is better today and supplemental O2 has decreased to [address] 15L/80% on higher steroid dose  Repeat COVID19 test is also neg  Procalcitonin level is not elevated     Since pulmonary status improves and worsens with change in his steroid dose, Pt was placed on Bactrim for possibility of PCP since he's at increased risk for this infxn due to his underlying lymphoma and MM  Pt was transferred to ICU on 5/12 and supplemental O2 was increased to HFNC 40L/70%  LDH is 323 and beta-D-glucan test is pending       Pt was started on tx for presumed ongoing COVID19 infxn with steroids and Remdesivir on 5/7/21 because COVID19 Ab was neg  Pt was also   given convalescent plasma  CRP has decreased to 19 7 on high dose Solumedrol  Pt completed 7 day course of Levaquin on 5/8 for possible pneumonia     Pt's supplemental O2 requirement increased to 15L by [facility] on 5/7  He was transferred to SD 2 on 5/8 because of worsening hypoxia  Chest CT showed increased pulmonary infilts but no PE  COVID19 IgG Ab was neg which indicated lack of development of immunity to recent COVID19 infxn due to his underlying lymphoma  Concerned that respiratory deterioration was due to ongoing COVID19 infxn  Pts with lymphoma are known to clear the virus more slowly  Repeat CRP had increased to 160 1 and Ferritin was 348   Pt received tx with IVIG on 5/6 as directed by his Oncologist which probably had some COVID19 Ab's     BNP was markedly elevated so there was probably a component of vol overload that was contributing to his current pulmonary sx's  He is being diuresed  Repeat BNP decreased to 6501  Repeat CXR did not show any signficant improvement with mild increase in RUL infilt      On admission, Pt had fever and leukocytosis, so need to consider possibility of pneumonia  Procalcitonin level was not markedly elevated but lactic acid was increased suggestive of sepsis  Urine Legionella Ag and Strep pneumo Ag were neg  Bld cx's are neg  MRSA screen  Inflammatory markers were still elevated from his recent COVID19 infxn which was tx'd with Remdesivir and decadron during his previous  admission from 4/6 to 4/10/21  He was re-admitted from 4/15 to 4/20 with increasing SOB and was tx'd with steroids       Pt presented with acute onset of increasing SOB and cough x 1 day       A  Awaiting results of Beta-D-glucan to evaluate for PCP - Pt is too unstable for bronch to eval for PCP and not able to produce sputum specimen for PCR for PCP  B  Will follow WBC count which may stay elevated due to high dose steroid tx  C  D-dimer is back down to 3 90 - cont anticoagulation as per Intensivist service - ?whether Pt is stable enough to undergo another chest CT to look for presence of PE  D  Will repeat BNP to eval for component of vol overload as contributing to his worsening pulmonary status - Pt may need further diuresis  E  OK to d/c isolation since second COVID19 test is neg - Pts with underlying lymphoma are known to take longer to clear the COVID19 virus so needed two neg COVID19 tests to take Pt out of isolation - s/p tx with convalescent plasma  F  Will stop Remdesivir - Pt  completed 6 doses   G  Cont steroid tx as per Pulmonary  H  Cont supplemental O2 as needed  I  Will follow inflammatory markers        Subjective: Breathing is better today   Transitioned to [address] 15L/80%    Objective:  Tmax: 96 8  Lungs: +Few crackles  Abd: +BS, soft, nontender  Ext: No calf tenderness    Labs:  CBC w/diff  Recent Labs     05/13/21  0503   WBC 16 61*   HGB 7 8*   HCT 26 5*      NEUTOPHILPCT 89*   LYMPHOPCT 6*   MONOPCT 3*   EOSPCT 0     BMP  Recent Labs     05/13/21  0503   K 3 7   CL 98*   CO2 38*   BUN 29*   CREATININE 0 95   CALCIUM 7 6*     CMP  Recent Labs     05/13/21  0503   K 3 7   CL 98*   CO2 38*   BUN 29*   CREATININE 0 95   CALCIUM 7 6*   ALKPHOS 139*   ALT 31   AST 18        labrc    Cultures:  Lab Results   Component Value Date    BLOODCX No Growth After 5 Days  05/02/2021    BLOODCX No Growth After 5 Days  05/02/2021    BLOODCX No Growth After 5 Days  04/15/2021    BLOODCX No Growth After 5 Days  04/15/2021    BLOODCX No Growth After 5 Days  04/06/2021    BLOODCX No Growth After 5 Days  04/06/2021    BLOODCX No Growth After 5 Days  04/01/2021    BLOODCX No Growth After 5 Days  04/01/2021    BLOODCX No Growth After 5 Days  02/27/2019    BLOODCX No Growth After 5 Days  02/27/2019    BLOODCX No Growth After 5 Days  11/25/2018    BLOODCX No Growth After 5 Days   11/25/2018     No results found for: WOUNDCULT  No results found for: URINECX  No results found for: SPUTUMCULTUR    MED:  Bactrim: #2  Remdesivir #7           Completed:  Vanco x 4 days on 5/5   Levaquin x 7 days on 5/8            Current Facility-Administered Medications:     acetaminophen (TYLENOL) tablet 650 mg, 650 mg, Oral, Q6H PRN, Sol Grebe, CRNP    aspirin chewable tablet 81 mg, 81 mg, Oral, Daily, Sol Grebe, CRNP, 81 mg at 05/13/21 0809    atorvastatin (LIPITOR) tablet 40 mg, 40 mg, Oral, Daily With Dinner, Sol Grebe, CRNP, 40 mg at 05/12/21 1632    cholecalciferol (VITAMIN D3) tablet 2,000 Units, 2,000 Units, Oral, Daily, Sol Grebe, CRNP, 2,000 Units at 05/13/21 0809    enoxaparin (LOVENOX) subcutaneous injection 70 mg, 1 mg/kg, Subcutaneous, Q12H [address], DUNG Castro, 70 mg at 05/13/21 5004    ferrous sulfate tablet 325 mg, 325 mg, Oral, Daily With Breakfast, Linda Nuñez, CRNP, 325 mg at 05/13/21 0809    furosemide (LASIX) injection 40 mg, 40 mg, Intravenous, BID (diuretic), Neita GRACIA GuyNP    insulin lispro (HumaLOG) 100 units/mL subcutaneous injection 10 Units, 10 Units, Subcutaneous, TID With Meals, Neita Adrienneather, CRNP, 10 Units at 05/13/21 1152    insulin regular (HumuLIN R,NovoLIN R) 1 Units/mL in sodium chloride 0 9 % 100 mL infusion, 0 3-21 Units/hr, Intravenous, Titrated, DUNG Nunez, Last Rate: 17 mL/hr at 05/13/21 1412, 17 Units/hr at 05/13/21 1412    lisinopril (ZESTRIL) tablet 20 mg, 20 mg, Oral, Daily, Michelle Muse, CRNP, 20 mg at 05/13/21 0809    melatonin tablet 6 mg, 6 mg, Oral, HS, Michelle Muse, CRNP, 6 mg at 05/12/21 2113    methylPREDNISolone sodium succinate (Solu-MEDROL) injection 125 mg, 125 mg, Intravenous, Q6H DONNIE, Tarik Marilu, CRNP, 125 mg at 05/13/21 1149    metoprolol succinate (TOPROL-XL) 24 hr tablet 100 mg, 100 mg, Oral, Daily, Michelle Muse, CRNP, 100 mg at 05/13/21 0809    multivitamin-minerals (CENTRUM ADULTS) tablet 1 tablet, 1 tablet, Oral, Daily, Michelle Muse, CRNP, 1 tablet at 05/13/21 0809    nitroglycerin (NITROSTAT) SL tablet 0 4 mg, 0 4 mg, Sublingual, Q5 Min PRN, Michelle Muse, CRNP    ondansetron [facility]) injection 4 mg, 4 mg, Intravenous, Q6H PRN, Michelle Muse, CRNP    pantoprazole (PROTONIX) EC tablet 40 mg, 40 mg, Oral, Early Morning, Michelle Muse, CRNP, 40 mg at 05/13/21 0505    [COMPLETED] remdesivir (Veklury) 200 mg in sodium chloride 0 9 % 250 mL IVPB, 200 mg, Intravenous, Q24H, 200 mg at 05/07/21 1711 **FOLLOWED BY** remdesivir (Veklury) 100 mg in sodium chloride 0 9 % 250 mL IVPB, 100 mg, Intravenous, Q24H, DUNG Durán, Stopped at 05/12/21 1819    saccharomyces boulardii (FLORASTOR) capsule 250 mg, 250 mg, Oral, BID, GRACIA DuránNP, 250 mg at 05/13/21 0810    simethicone (MYLICON) chewable tablet 80 mg, 80 mg, Oral, Q6H PRN, DUNG Durán, 80 mg at 05/08/21 1101   sulfamethoxazole-trimethoprim (BACTRIM DS) 800-160 mg per tablet 2 tablet, 2 tablet, Oral, Q8H [address], Ravinder Christensen MD, 2 tablet at 05/13/21 1407    Principal Problem:    Acute respiratory failure with hypoxia (HCC)  Active Problems:    Coronary disease    Type 2 diabetes mellitus without complication, without long-term current use of insulin (HCC)    Lymphoma (HCC)    Essential hypertension    Chronic diastolic congestive heart failure (HCC)    Paroxysmal atrial fibrillation (HCC)    Gastroesophageal reflux disease with esophagitis    Pneumonia due to COVID-19 virus    Anemia      Ravinder Christensen MD

## 2021-05-13 NOTE — ASSESSMENT & PLAN NOTE
· Covid + on 4/6  · Has had two admissions since, 4/6 to 4/10 and 4/15 to 4/20  CXR GGO  CTA neg PE worsening consolidation/b/l pleural effusions  severe COVID-19 tx algorithm/Positive 4/6  IS  Self prone  MEDICATIONS  Solumedrol 125mg q 6hrs D2/3  Levaquin completed 5/8  Bactrim D2 given concern for PCP pna   Atorvastatin home dose   Enoxaparin therapeutic  Remdesimir D7/10   Plasma na  Actemra na  INFLAMMATORY MARKERS (trend q 48-72)  CRP 19--from [id number]  Ferritin 465--peak 529  D-Dimer 3 9---peak7 9  Troponin 0 05  BNP 6500 from 27095  CK 24  PCT 0 13

## 2021-05-13 NOTE — PROGRESS NOTES
[place]  Progress Note - Dea Martínez 1945, 77 y o  male MRN: [id number]  Unit/Bed#: -01 Encounter: [id number]  Primary Care Provider: Bethany Diana DO   Date and time admitted to hospital: 5/2/2021  2:28 PM    * Pneumonia due to COVID-19 virus  Assessment & Plan  · Covid + on 4/6  · Has had two admissions since, 4/6 to 4/10 and 4/15 to 4/20  · Presented to the hospital on 5/2 for worsening SOB and worsening infiltrate  · ID consulted on admission - antibiotics completed  · Procalcitonin negative  · Convalescent plasma given on 5/7  · Pulmonary following - on 5/12 steroids increased to Solumedrol 125 mg IV q 6 H -   · Transfered to step down unit on 5/12 for worsening hypoxia and increased oxygen demands   · Currently high-flow nasal cannula - titrate for O2 88% or greater     Essential hypertension  Assessment & Plan  · Continue Toprol    Type 2 diabetes mellitus without complication, without long-term current use of insulin [facility])  Assessment & Plan  Lab Results   Component Value Date    HGBA1C 5 8 (H) 12/30/2020       Recent Labs     05/12/21  0802 05/12/21  1129 05/12/21  1619 05/12/21  2105   POCGLU 217* 285* 418* 496*       Blood Sugar Average: Last 72 hrs:  (P) 296 25     · Continue sliding scale coverage  · Given Lantus at bedtime - started on 05/09 due to elevated blood sugars in the setting of steroid use however patient developed a rash and was refusing to take it  · Continue Humalog 5 units t i d   With meals-consider starting an insulin drip if blood sugars remain elevated for goal 140-180      Dyslipidemia  Assessment & Plan  · Continue Lipitor    Severe sepsis ([address] )  Assessment & Plan  · Secondary to HCAP  · On admission patient met criteria for sepsis with fever, leukocytosis  · Patient did have lactic acidosis and tachypnea  · Antibiotics completed  · Blood cultures negative x5 days    Paroxysmal atrial fibrillation [facility])  Assessment & Plan  · Patient is on Eliquis as outpatient  · Rate controlled on Lopressor  · Continue Eliquis     Chronic diastolic congestive heart failure (HCC)  Assessment & Plan  Wt Readings from Last 3 Encounters:   05/11/21 69 8 kg (153 lb 12 8 oz)   04/20/21 73 2 kg (161 lb 6 oz)   04/10/21 76 8 kg (169 lb 5 oz)       · Initially, diuretics held on admission - home dose Lasix 20 mg b i d   · Currently being diuresed with Lasix 40 mg t i d  IV  · Elevated BNP on admission  · Echocardiogram shows EF 40%  · CTA shows pulmonary infusions congestion - continue IV diuresis, chest x-ray worse      Acute respiratory failure with hypoxia (HCC)  Assessment & Plan  · Secondary to COVID-19 as evidenced by increased shortness of breath and work of breathing versus PCP PNA  · Patient initially on mid flow however transferred to step-down unit on 05/12 for high-flow nasal cannula  · Chest x-ray with worsening bilateral infiltrates and pleural effusions  · ID consulted and appreciate recs  · Started on Bactrim PO  · Continue IV diuresis - Lasix 40 mg t i d   · Recently had PET scan completed an outpatient which shows no lymph nodes eliminated but reactive inflammatory response versus tumors  · Titrate FiO2 to keep O2 sats 88% or greater  · Continue severe COVID-19 protocol    Lymphoma ([facility] [address] )  Assessment & Plan  · Follows with Dr Heydi Membreno at [facility] [phone]  · Patient is considering lung biopsy, however he is currently not stable enough to undergo biopsy  · PET scan done at [facility] concerning for limiting areas in question of possibility to of reactive inflammatory response versus tumors  · Patient will need outpatient follow-up with oncologist  · Received IVIG on 05/06  · May be contributing to persistent COVID-19 symptoms    Coronary disease  Assessment & Plan  · Status post CABG  · Continue aspirin, statin, beta-blocker       ----------------------------------------------------------------------------------------  HPI/24hr events: Transferred to the ICU yesterday as a SD level 1 for worsening hypoxia requiring HFNC 40L/60%  Diuresed well with Lasix 40 mg IV TID for net goal of -1 4L/24 hours  Disposition: Continue Stepdown Level 1 level of care   Code Status: Level 3 - DNAR and DNI  ---------------------------------------------------------------------------------------  SUBJECTIVE  "Im tired " Reports cough and feeling fatigue  Denied any pain      Review of Systems  Review of systems was reviewed and negative unless stated above in HPI/24-hour events   ---------------------------------------------------------------------------------------  OBJECTIVE    Vitals   Vitals:    21 21121 2300 21 2315 21 0000   BP:    126/59   BP Location:    Right arm   Pulse:       Resp:       Temp:  (!) 96 °F (35 6 °C)  (!) 96 °F (35 6 °C)   TempSrc:  Oral  Oral   SpO2: 99%  98% 99%   Weight:       Height:         Temp (24hrs), Av 3 °F (35 7 °C), Min:95 9 °F (35 5 °C), Max:96 8 °F (36 °C)  Current: Temperature: (!) 96 °F (35 6 °C)          Respiratory:  HFNC 40L/ 60%     Invasive/non-invasive ventilation settings   Respiratory    Lab Data (Last 4 hours)    None         O2/Vent Data (Last 4 hours)       0000          Non-Invasive Ventilation Mode HFNC (High flow)                   Physical Exam    Laboratory and Diagnostics:  Results from last 7 days   Lab Units 21  0329 21  0832 05/10/21  0531 21  0332 21  1249 21  0557 21  0543 21  0404   WBC Thousand/uL 17 80* 14 73* 13 47* 12 13* 10 21* 10 06 9 66  --    HEMOGLOBIN g/dL 8 2* 8 1* 8 2* 7 5* 8 3* 7 4* 7 4*  7 6*  --    HEMATOCRIT % 27 4* 27 3* 27 6* 24 6* 27 4* 24 6* 23 9*  24 0*  --    PLATELETS Thousands/uL 327 191 332 260 257 199 213  --    NEUTROS PCT % 88* 81* 87* 89* 90* 79*  --  76*   MONOS PCT % 4 7 6 5 5 9  --  8     Results from last 7 days   Lab Units 21  0329 21  8542 05/10/21  0531 21  0334 05/08/21  1249 05/07/21  0557 05/06/21  1347   SODIUM mmol/L 142 142 141 137 137 138 139   POTASSIUM mmol/L 4 2 3 6 3 6 3 6 3 6 3 2* 3 1*   CHLORIDE mmol/L 101 101 100 97* 96* 98* 97*   CO2 mmol/L 31 37* 34* 34* 33* 31 35*   ANION GAP mmol/L 10 4 7 6 8 9 7   BUN mg/dL 27* 30* 31* 32* 26* 14 15   CREATININE mg/dL 0 96 1 07 1 11 1 12 1 05 1 00 1 08   CALCIUM mg/dL 7 9* 7 9* 8 0* 8 0* 8 2* 7 7* 8 2*   GLUCOSE RANDOM mg/dL 220* 164* 168* 248* 272* 117 123   ALT U/L 33 38 42 42 33 25  --    AST U/L 18 23 28 44 31 24  --    ALK PHOS U/L 142* 120* 124* 127* 104 99  --    ALBUMIN g/dL 2 4* 2 0* 2 1* 1 6* 1 8* 1 7*  --    TOTAL BILIRUBIN mg/dL 0 90 0 80 0 70 0 60 0 80 0 90  --                        ABG:    VBG:    Results from last 7 days   Lab Units 05/12/21  1139   PROCALCITONIN ng/ml 0 13       Micro        EKG: NSR with rate of 73 on tele  Imaging: I have personally reviewed pertinent films in PACS    Intake and Output  I/O       05/11 0701 - 05/12 0700 05/12 0701 - 05/13 0700    P  O  1020 1340    I V  (mL/kg)  52 (0 7)    IV Piggyback 250 250    Total Intake(mL/kg) 1270 (18 2) 1642 (23 5)    Urine (mL/kg/hr) 950 (0 6) 3100 (1 9)    Total Output 950 3100    Net +320 -1458                Height and Weights   Height: 5' 11" (180 3 cm)  IBW (Ideal Body Weight): 75 3 kg  Body mass index is 21 45 kg/m²  Weight (last 2 days)     Date/Time   Weight    05/11/21 0600   69 8 (153 8)                Nutrition       Diet Orders   (From admission, onward)             Start     Ordered    05/13/21 0047  Diet Shaw/CHO Controlled; Consistent Carbohydrate Diet Level 2 (5 carb servings/75 grams CHO/meal)  Diet effective now     Question Answer Comment   Diet Type Shaw/CHO Controlled    Shaw/CHO Controlled Consistent Carbohydrate Diet Level 2 (5 carb servings/75 grams CHO/meal)    RD to adjust diet per protocol?  Yes        05/13/21 0046                  Active Medications  Scheduled Meds:  Current Facility-Administered Medications Medication Dose Route Frequency Provider Last Rate    acetaminophen  650 mg Oral Q6H PRN Sol [place], DUNG      aspirin  81 mg Oral Daily Sol DUNG Nuñez      atorvastatin  40 mg Oral Daily With [facility] DrDUNG      cholecalciferol  2,000 Units Oral Daily Sol [place], DUNG      enoxaparin  1 mg/kg Subcutaneous Q12H [address] DUNG Castro      famotidine  20 mg Intravenous Q12H 7955 DUNG Yusuf      ferrous sulfate  325 mg Oral Daily With Breakfast Sol DUNG Nuñez      furosemide  40 mg Intravenous TID (diuretic) DUNG Castro      insulin lispro  4-20 Units Subcutaneous TID AC Sol [place], DUNG      insulin lispro  4-20 Units Subcutaneous HS Smita MARTINEZ Leo, DUNG      insulin lispro  5 Units Subcutaneous TID With Meals Sol DUNG Nuñez      ipratropium  0 5 mg Nebulization TID DUNG Castro      levalbuterol  1 25 mg Nebulization TID DUNG Castro      lisinopril  20 mg Oral Daily Sol DUNG Nuñez      melatonin  6 mg Oral HS Sol DUNG Nuñez      methylPREDNISolone sodium succinate  125 mg Intravenous Q6H [address] DUNG Castro      metoprolol succinate  100 mg Oral Daily Sol DUNG Nuñez      multivitamin-minerals  1 tablet Oral Daily Sol DUNG Nuñez      nitroglycerin  0 4 mg Sublingual Q5 Min PRN Sol DUNG Nuñez      ondansetron  4 mg Intravenous Q6H PRN Sol DUNG Nuñez      pantoprazole  40 mg Oral Early Morning Sol DUNG Nuñez      remdesivir  100 mg Intravenous Q24H Sol DUNG Nuñez Stopped (05/12/21 1819)    saccharomyces boulardii  250 mg Oral BID Sol DUNG Nuñez      simethicone  80 mg Oral Q6H PRN Sol DUNG Nuñez      sulfamethoxazole-trimethoprim  2 tablet Oral Q8H Josue Daniels MD       Continuous Infusions:     PRN Meds:   acetaminophen, 650 mg, Q6H PRN  nitroglycerin, 0 4 mg, Q5 Min PRN  ondansetron, 4 mg, Q6H PRN  simethicone, 80 mg, Q6H PRN        Invasive Devices Review  Invasive Devices     Peripheral Intravenous Line Peripheral IV 05/10/21 Left;Ventral (anterior) Forearm 2 days                Rationale for remaining devices: N/A  ---------------------------------------------------------------------------------------  Advance Directive and Living Will:      Power of :    POLST:    ---------------------------------------------------------------------------------------  Care Time Delivered:   No Critical Care time spent       [employer], DUNG      Portions of the record may have been created with voice recognition software  Occasional wrong word or "sound a like" substitutions may have occurred due to the inherent limitations of voice recognition software    Read the chart carefully and recognize, using context, where substitutions have occurred

## 2021-05-13 NOTE — ASSESSMENT & PLAN NOTE
· Secondary to COVID-19 versus PCP PNA vs fibrosis  · Admit 5/2 transferred on 05/12 for high-flow nasal cannula  · CTA -PE, worsening bilateral infiltrates and pleural effusions  · Recently had PET scan completed an outpatient which shows no lymph nodes eliminated but reactive inflammatory response versus tumors  · ID consulted and appreciate recs  · Solumedrol 125mg q 6hrs D2/3  · Bactrim D2  · 5/8 levaquin completed  · Decrease diuretics to bID given elevated bicarb contraction alkalosis and o2 improvements  · HFNC 40/40 Titrate FiO2 to keep O2 sats 88% or greater  · Continue severe COVID-19 protocol

## 2021-05-13 NOTE — ASSESSMENT & PLAN NOTE
· Follows with Dr Tamara Flaherty with [address]  [place] [phone]  · Considering lung biopsy, patient currently not stable enough to undergo biopsy  · PET scan was done through [facility]- concerning for illuminating areas, question possible reactive inflammatory response versus tumors    Patient will need outpatient follow-up with his oncologist   · Recommending IVIG- ordered 5/6  · Last chemo 2015  · Consider bronch if down to midflow/nc to r/o lymph spread vs fibrosis

## 2021-05-13 NOTE — ASSESSMENT & PLAN NOTE
Wt Readings from Last 3 Encounters:   05/13/21 67 4 kg (148 lb 9 4 oz)   04/20/21 73 2 kg (161 lb 6 oz)   04/10/21 76 8 kg (169 lb 5 oz)       · Echocardiogram shows EF 40% diffuse hypokinesis bio AVR from EF 60% in 2019  · BNP 11000--down to 6500  · Decrease Lasix 40mg IV BID given contraction alkalosis no evidence of edema/JVD  · CTA b/l Pleural effusions greater on right  · Neg 1 6L goal -500 today to even  · I/o  · Daily weight

## 2021-05-13 NOTE — PLAN OF CARE
Problem: RESPIRATORY - ADULT  Goal: Achieves optimal ventilation and oxygenation  Description: INTERVENTIONS:  - Assess for changes in respiratory status  - Assess for changes in mentation and behavior  - Position to facilitate oxygenation and minimize respiratory effort  - Oxygen administered by appropriate delivery if ordered  - Initiate smoking cessation education as indicated  - Encourage broncho-pulmonary hygiene including cough, deep breathe, Incentive Spirometry  - Assess the need for suctioning and aspirate as needed  - Assess and instruct to report SOB or any respiratory difficulty  - Respiratory Therapy support as indicated  Outcome: Progressing     Problem: DISCHARGE PLANNING  Goal: Discharge to home or other facility with appropriate resources  Description: INTERVENTIONS:  - Identify barriers to discharge w/patient and caregiver  - Arrange for needed discharge resources and transportation as appropriate  - Identify discharge learning needs (meds, wound care, etc )  - Arrange for interpretive services to assist at discharge as needed  - Refer to Case Management Department for coordinating discharge planning if the patient needs post-hospital services based on physician/advanced practitioner order or complex needs related to functional status, cognitive ability, or social support system  Outcome: Progressing     Problem: Knowledge Deficit  Goal: Patient/family/caregiver demonstrates understanding of disease process, treatment plan, medications, and discharge instructions  Description: Complete learning assessment and assess knowledge base    Interventions:  - Provide teaching at level of understanding  - Provide teaching via preferred learning methods  Outcome: Progressing     Problem: PAIN - ADULT  Goal: Verbalizes/displays adequate comfort level or baseline comfort level  Description: Interventions:  - Encourage patient to monitor pain and request assistance  - Assess pain using appropriate pain scale  - Administer analgesics based on type and severity of pain and evaluate response  - Implement non-pharmacological measures as appropriate and evaluate response  - Consider cultural and social influences on pain and pain management  - Notify physician/advanced practitioner if interventions unsuccessful or patient reports new pain  Outcome: Progressing     Problem: INFECTION - ADULT  Goal: Absence or prevention of progression during hospitalization  Description: INTERVENTIONS:  - Assess and monitor for signs and symptoms of infection  - Monitor lab/diagnostic results  - Monitor all insertion sites, i e  indwelling lines, tubes, and drains  - Monitor endotracheal if appropriate and nasal secretions for changes in amount and color  - [place] appropriate cooling/warming therapies per order  - Administer medications as ordered  - Instruct and encourage patient and family to use good hand hygiene technique  - Identify and instruct in appropriate isolation precautions for identified infection/condition  Outcome: Progressing     Problem: SAFETY ADULT  Goal: Patient will remain free of falls  Description: INTERVENTIONS:  - Assess patient frequently for physical needs  -  Identify cognitive and physical deficits and behaviors that affect risk of falls    -  [place] fall precautions as indicated by assessment   - Educate patient/family on patient safety including physical limitations  - Instruct patient to call for assistance with activity based on assessment  - Modify environment to reduce risk of injury  - Consider OT/PT consult to assist with strengthening/mobility  Outcome: Progressing  Goal: Maintain or return to baseline ADL function  Description: INTERVENTIONS:  -  Assess patient's ability to carry out ADLs; assess patient's baseline for ADL function and identify physical deficits which impact ability to perform ADLs (bathing, care of mouth/teeth, toileting, grooming, dressing, etc )  - Assess/evaluate cause of self-care deficits   - Assess range of motion  - Assess patient's mobility; develop plan if impaired  - Assess patient's need for assistive devices and provide as appropriate  - Encourage maximum independence but intervene and supervise when necessary  - Involve family in performance of ADLs  - Assess for home care needs following discharge   - Consider OT consult to assist with ADL evaluation and planning for discharge  - Provide patient education as appropriate  Outcome: Progressing  Goal: Maintain or return mobility status to optimal level  Description: INTERVENTIONS:  - Assess patient's baseline mobility status (ambulation, transfers, stairs, etc )    - Identify cognitive and physical deficits and behaviors that affect mobility  - Identify mobility aids required to assist with transfers and/or ambulation (gait belt, sit-to-stand, lift, walker, cane, etc )  - [place] fall precautions as indicated by assessment  - Record patient progress and toleration of activity level on Mobility SBAR; progress patient to next Phase/Stage  - Instruct patient to call for assistance with activity based on assessment  - Consider rehabilitation consult to assist with strengthening/weightbearing, etc   Outcome: Progressing     Problem: Potential for Falls  Goal: Patient will remain free of falls  Description: INTERVENTIONS:  - Assess patient frequently for physical needs  -  Identify cognitive and physical deficits and behaviors that affect risk of falls    -  [place] fall precautions as indicated by assessment   - Educate patient/family on patient safety including physical limitations  - Instruct patient to call for assistance with activity based on assessment  - Modify environment to reduce risk of injury  - Consider OT/PT consult to assist with strengthening/mobility  Outcome: Progressing     Problem: Prexisting or High Potential for Compromised Skin Integrity  Goal: Skin integrity is maintained or improved  Description: INTERVENTIONS:  - Identify patients at risk for skin breakdown  - Assess and monitor skin integrity  - Assess and monitor nutrition and hydration status  - Monitor labs   - Assess for incontinence   - Turn and reposition patient  - Assist with mobility/ambulation  - Relieve pressure over bony prominences  - Avoid friction and shearing  - Provide appropriate hygiene as needed including keeping skin clean and dry  - Evaluate need for skin moisturizer/barrier cream  - Collaborate with interdisciplinary team   - Patient/family teaching  - Consider wound care consult   Outcome: Progressing

## 2021-05-13 NOTE — ASSESSMENT & PLAN NOTE
Lab Results   Component Value Date    HGBA1C 5 8 (H) 12/30/2020       Recent Labs     05/12/21  1619 05/12/21  2105 05/13/21  0538 05/13/21  0736   POCGLU 418* 496* 287* 282*       Blood Sugar Average: Last 72 hrs:  (P) 294 [id number]     · Holding home metformin  · Started on insulin gtt

## 2021-05-13 NOTE — ASSESSMENT & PLAN NOTE
· Secondary to COVID-19 as evidenced by increased shortness of breath and work of breathing versus PCP PNA  · Patient initially on mid flow however transferred to step-down unit on 05/12 for high-flow nasal cannula  · Chest x-ray with worsening bilateral infiltrates and pleural effusions  · ID consulted and appreciate recs  · Started on Bactrim PO  · Continue IV diuresis - Lasix 40 mg t i d   · Recently had PET scan completed an outpatient which shows no lymph nodes eliminated but reactive inflammatory response versus tumors  · Titrate FiO2 to keep O2 sats 88% or greater  · Continue severe COVID-19 protocol

## 2021-05-13 NOTE — ASSESSMENT & PLAN NOTE
· Covid + on 4/6  · Has had two admissions since, 4/6 to 4/10 and 4/15 to 4/20  · Presented to the hospital on 5/2 for worsening SOB and worsening infiltrate  · ID consulted on admission - antibiotics completed  · Procalcitonin negative  · Convalescent plasma given on 5/7  · Pulmonary following - on 5/12 steroids increased to Solumedrol 125 mg IV q 6 H -   · Transfered to step down unit on 5/12 for worsening hypoxia and increased oxygen demands   · Currently high-flow nasal cannula - titrate for O2 88% or greater

## 2021-05-13 NOTE — ASSESSMENT & PLAN NOTE
· Secondary to HCAP  · On admission patient met criteria for sepsis with fever, leukocytosis  · Patient did have lactic acidosis and tachypnea  · Antibiotics completed  · Blood cultures negative x5 days

## 2021-05-13 NOTE — ASSESSMENT & PLAN NOTE
· Follows with Dr Army Lane at [facility] [phone]  · Patient is considering lung biopsy, however he is currently not stable enough to undergo biopsy  · PET scan done at [facility] concerning for limiting areas in question of possibility to of reactive inflammatory response versus tumors  · Patient will need outpatient follow-up with oncologist  · Received IVIG on 05/06  · Last chemo 2015  · Consider bronch if decreased to midflow/NC to r/o lymph spread of cancer vs fibrosis

## 2021-05-13 NOTE — ASSESSMENT & PLAN NOTE
Lab Results   Component Value Date    HGBA1C 5 8 (H) 12/30/2020       Recent Labs     05/12/21  0802 05/12/21  1129 05/12/21  1619 05/12/21  2105   POCGLU 217* 285* 418* 496*       Blood Sugar Average: Last 72 hrs:  (P) 296 25     · Continue sliding scale coverage  · Given Lantus at bedtime - started on 05/09 due to elevated blood sugars in the setting of steroid use however patient developed a rash and was refusing to take it  · Continue Humalog 5 units t i d   With meals-consider starting an insulin drip if blood sugars remain elevated for goal 140-180

## 2021-05-13 NOTE — ASSESSMENT & PLAN NOTE
Wt Readings from Last 3 Encounters:   05/11/21 69 8 kg (153 lb 12 8 oz)   04/20/21 73 2 kg (161 lb 6 oz)   04/10/21 76 8 kg (169 lb 5 oz)       · Initially, diuretics held on admission - home dose Lasix 20 mg b i d   · Currently being diuresed with Lasix 40 mg t i d  IV  · Elevated BNP on admission  · Echocardiogram shows EF 40%  · CTA shows pulmonary infusions congestion - continue IV diuresis, chest x-ray worse

## 2021-05-14 ENCOUNTER — APPOINTMENT (INPATIENT)
Dept: RADIOLOGY | Facility: HOSPITAL | Age: 76
DRG: 871 | End: 2021-05-14
Payer: COMMERCIAL

## 2021-05-14 LAB
ANION GAP SERPL CALCULATED.3IONS-SCNC: 5 MMOL/L (ref 4–13)
BASOPHILS # BLD MANUAL: 0 THOUSAND/UL (ref 0–0.1)
BASOPHILS NFR MAR MANUAL: 0 % (ref 0–1)
BUN SERPL-MCNC: 36 MG/DL (ref 5–25)
CALCIUM SERPL-MCNC: 7.9 MG/DL (ref 8.3–10.1)
CHLORIDE SERPL-SCNC: 98 MMOL/L (ref 100–108)
CO2 SERPL-SCNC: 36 MMOL/L (ref 21–32)
CREAT SERPL-MCNC: 1.07 MG/DL (ref 0.6–1.3)
CRP SERPL QL: 15.4 MG/L
D DIMER PPP FEU-MCNC: 3.26 UG/ML FEU
DACRYOCYTES BLD QL SMEAR: PRESENT
EOSINOPHIL # BLD MANUAL: 0 THOUSAND/UL (ref 0–0.4)
EOSINOPHIL NFR BLD MANUAL: 0 % (ref 0–6)
ERYTHROCYTE [DISTWIDTH] IN BLOOD BY AUTOMATED COUNT: 20.9 % (ref 11.6–15.1)
GFR SERPL CREATININE-BSD FRML MDRD: 68 ML/MIN/1.73SQ M
GLUCOSE SERPL-MCNC: 105 MG/DL (ref 65–140)
GLUCOSE SERPL-MCNC: 132 MG/DL (ref 65–140)
GLUCOSE SERPL-MCNC: 141 MG/DL (ref 65–140)
GLUCOSE SERPL-MCNC: 142 MG/DL (ref 65–140)
GLUCOSE SERPL-MCNC: 145 MG/DL (ref 65–140)
GLUCOSE SERPL-MCNC: 158 MG/DL (ref 65–140)
GLUCOSE SERPL-MCNC: 168 MG/DL (ref 65–140)
GLUCOSE SERPL-MCNC: 174 MG/DL (ref 65–140)
GLUCOSE SERPL-MCNC: 176 MG/DL (ref 65–140)
GLUCOSE SERPL-MCNC: 178 MG/DL (ref 65–140)
GLUCOSE SERPL-MCNC: 178 MG/DL (ref 65–140)
GLUCOSE SERPL-MCNC: 205 MG/DL (ref 65–140)
GLUCOSE SERPL-MCNC: 251 MG/DL (ref 65–140)
GLUCOSE SERPL-MCNC: 315 MG/DL (ref 65–140)
HCT VFR BLD AUTO: 26.5 % (ref 36.5–49.3)
HGB BLD-MCNC: 8 G/DL (ref 12–17)
HYPERCHROMIA BLD QL SMEAR: PRESENT
LG PLATELETS BLD QL SMEAR: PRESENT
LYMPHOCYTES # BLD AUTO: 2 THOUSAND/UL (ref 0.6–4.47)
LYMPHOCYTES # BLD AUTO: 8 % (ref 14–44)
MCH RBC QN AUTO: 24.3 PG (ref 26.8–34.3)
MCHC RBC AUTO-ENTMCNC: 30.2 G/DL (ref 31.4–37.4)
MCV RBC AUTO: 81 FL (ref 82–98)
MICROCYTES BLD QL AUTO: PRESENT
MONOCYTES # BLD AUTO: 1.5 THOUSAND/UL (ref 0–1.22)
MONOCYTES NFR BLD: 6 % (ref 4–12)
NEUTROPHILS # BLD MANUAL: 21.55 THOUSAND/UL (ref 1.85–7.62)
NEUTS BAND NFR BLD MANUAL: 1 % (ref 0–8)
NEUTS SEG NFR BLD AUTO: 85 % (ref 43–75)
OVALOCYTES BLD QL SMEAR: PRESENT
PLATELET # BLD AUTO: 389 THOUSANDS/UL (ref 149–390)
PLATELET BLD QL SMEAR: ADEQUATE
PLATELET CLUMP BLD QL SMEAR: PRESENT
PMV BLD AUTO: 9.6 FL (ref 8.9–12.7)
POIKILOCYTOSIS BLD QL SMEAR: PRESENT
POLYCHROMASIA BLD QL SMEAR: PRESENT
POTASSIUM SERPL-SCNC: 4.2 MMOL/L (ref 3.5–5.3)
RBC # BLD AUTO: 3.29 MILLION/UL (ref 3.88–5.62)
RBC MORPH BLD: PRESENT
SODIUM SERPL-SCNC: 139 MMOL/L (ref 136–145)
TOTAL CELLS COUNTED SPEC: 100
WBC # BLD AUTO: 25.06 THOUSAND/UL (ref 4.31–10.16)

## 2021-05-14 PROCEDURE — 85007 BL SMEAR W/DIFF WBC COUNT: CPT | Performed by: NURSE PRACTITIONER

## 2021-05-14 PROCEDURE — 82948 REAGENT STRIP/BLOOD GLUCOSE: CPT

## 2021-05-14 PROCEDURE — 85027 COMPLETE CBC AUTOMATED: CPT | Performed by: NURSE PRACTITIONER

## 2021-05-14 PROCEDURE — 99232 SBSQ HOSP IP/OBS MODERATE 35: CPT | Performed by: INTERNAL MEDICINE

## 2021-05-14 PROCEDURE — 80048 BASIC METABOLIC PNL TOTAL CA: CPT | Performed by: NURSE PRACTITIONER

## 2021-05-14 PROCEDURE — 97116 GAIT TRAINING THERAPY: CPT

## 2021-05-14 PROCEDURE — 97163 PT EVAL HIGH COMPLEX 45 MIN: CPT

## 2021-05-14 PROCEDURE — 85379 FIBRIN DEGRADATION QUANT: CPT | Performed by: NURSE PRACTITIONER

## 2021-05-14 PROCEDURE — 97167 OT EVAL HIGH COMPLEX 60 MIN: CPT

## 2021-05-14 PROCEDURE — 71045 X-RAY EXAM CHEST 1 VIEW: CPT

## 2021-05-14 PROCEDURE — 94760 N-INVAS EAR/PLS OXIMETRY 1: CPT

## 2021-05-14 PROCEDURE — 86140 C-REACTIVE PROTEIN: CPT | Performed by: NURSE PRACTITIONER

## 2021-05-14 RX ORDER — METHYLPREDNISOLONE SODIUM SUCCINATE 125 MG/2ML
125 INJECTION, POWDER, LYOPHILIZED, FOR SOLUTION INTRAMUSCULAR; INTRAVENOUS EVERY 6 HOURS SCHEDULED
Status: COMPLETED | OUTPATIENT
Start: 2021-05-14 | End: 2021-05-15

## 2021-05-14 RX ORDER — POLYETHYLENE GLYCOL 3350 17 G/17G
17 POWDER, FOR SOLUTION ORAL DAILY PRN
Status: DISCONTINUED | OUTPATIENT
Start: 2021-05-14 | End: 2021-05-20 | Stop reason: HOSPADM

## 2021-05-14 RX ORDER — METHYLPREDNISOLONE SODIUM SUCCINATE 125 MG/2ML
60 INJECTION, POWDER, LYOPHILIZED, FOR SOLUTION INTRAMUSCULAR; INTRAVENOUS EVERY 6 HOURS SCHEDULED
Status: DISCONTINUED | OUTPATIENT
Start: 2021-05-15 | End: 2021-05-15

## 2021-05-14 RX ADMIN — Medication 250 MG: at 07:33

## 2021-05-14 RX ADMIN — SULFAMETHOXAZOLE AND TRIMETHOPRIM 2 TABLET: 800; 160 TABLET ORAL at 05:42

## 2021-05-14 RX ADMIN — LISINOPRIL 20 MG: 20 TABLET ORAL at 07:49

## 2021-05-14 RX ADMIN — FUROSEMIDE 40 MG: 10 INJECTION, SOLUTION INTRAVENOUS at 07:32

## 2021-05-14 RX ADMIN — ENOXAPARIN SODIUM 70 MG: 80 INJECTION SUBCUTANEOUS at 21:06

## 2021-05-14 RX ADMIN — Medication 250 MG: at 16:56

## 2021-05-14 RX ADMIN — SODIUM CHLORIDE 3 UNITS/HR: 9 INJECTION, SOLUTION INTRAVENOUS at 23:55

## 2021-05-14 RX ADMIN — METOPROLOL SUCCINATE 100 MG: 50 TABLET, EXTENDED RELEASE ORAL at 07:33

## 2021-05-14 RX ADMIN — METHYLPREDNISOLONE SODIUM SUCCINATE 125 MG: 125 INJECTION, POWDER, FOR SOLUTION INTRAMUSCULAR; INTRAVENOUS at 18:31

## 2021-05-14 RX ADMIN — ENOXAPARIN SODIUM 70 MG: 80 INJECTION SUBCUTANEOUS at 07:33

## 2021-05-14 RX ADMIN — FUROSEMIDE 40 MG: 10 INJECTION, SOLUTION INTRAVENOUS at 16:49

## 2021-05-14 RX ADMIN — METHYLPREDNISOLONE SODIUM SUCCINATE 125 MG: 125 INJECTION, POWDER, FOR SOLUTION INTRAMUSCULAR; INTRAVENOUS at 05:42

## 2021-05-14 RX ADMIN — MELATONIN TAB 3 MG 6 MG: 3 TAB at 21:06

## 2021-05-14 RX ADMIN — SULFAMETHOXAZOLE AND TRIMETHOPRIM 2 TABLET: 800; 160 TABLET ORAL at 13:44

## 2021-05-14 RX ADMIN — SULFAMETHOXAZOLE AND TRIMETHOPRIM 2 TABLET: 800; 160 TABLET ORAL at 21:06

## 2021-05-14 RX ADMIN — ASPIRIN 81 MG CHEWABLE TABLET 81 MG: 81 TABLET CHEWABLE at 07:36

## 2021-05-14 RX ADMIN — INSULIN LISPRO 3 UNITS: 100 INJECTION, SOLUTION INTRAVENOUS; SUBCUTANEOUS at 07:36

## 2021-05-14 RX ADMIN — MULTIPLE VITAMINS W/ MINERALS TAB 1 TABLET: TAB ORAL at 07:32

## 2021-05-14 RX ADMIN — Medication 2000 UNITS: at 07:33

## 2021-05-14 RX ADMIN — ATORVASTATIN CALCIUM 40 MG: 40 TABLET, FILM COATED ORAL at 16:49

## 2021-05-14 RX ADMIN — FERROUS SULFATE TAB 325 MG (65 MG ELEMENTAL FE) 325 MG: 325 (65 FE) TAB at 07:32

## 2021-05-14 RX ADMIN — METHYLPREDNISOLONE SODIUM SUCCINATE 125 MG: 125 INJECTION, POWDER, FOR SOLUTION INTRAMUSCULAR; INTRAVENOUS at 11:38

## 2021-05-14 RX ADMIN — PANTOPRAZOLE SODIUM 40 MG: 40 TABLET, DELAYED RELEASE ORAL at 05:42

## 2021-05-14 NOTE — ASSESSMENT & PLAN NOTE
· Follows with Dr Jaymie Zaldivar at [facility] [phone]  · Patient is considering lung biopsy, however he is currently not stable enough to undergo biopsy  · PET scan done at [facility] concerning for limiting areas in question of possibility to of reactive inflammatory response versus tumors  · ----PET SCAN 4/31 POST COVID DIAGNOSIS ??? SIGNIFICANCE  · Patient will need outpatient follow-up with oncologist  · Received IVIG on 05/06  · Last chemo 2015  · Consider bronch if decreased to midflow/NC to r/o lymph spread of cancer vs fibrosis

## 2021-05-14 NOTE — NURSING NOTE
Pt had no BM's today up to this point  Pt refused bowel meds started this afternoon, stating that he doesn't need the bowel meds and that he had bowel movements three days in a row

## 2021-05-14 NOTE — ASSESSMENT & PLAN NOTE
· Secondary to COVID-19 versus PCP PNA vs fibrosis  · Admit 5/2 transferred on 05/12 for high-flow nasal cannula  · CTA -PE, worsening bilateral infiltrates and pleural effusions  · Recently had PET scan completed an outpatient which shows no lymph nodes eliminated but reactive inflammatory response versus tumors  · ID consulted and appreciate recs  · Solumedrol 125mg q 6hrs completed D3/3 on 5/14  · Start solumedrol 60 mg q12 hr today   · Bactrim D4  · 5/8 levaquin completed  · Lasix BID   · Currently on NC 5-6 L, decrease FiO2 to keep O2 sats 88% or greater

## 2021-05-14 NOTE — PLAN OF CARE
Problem: RESPIRATORY - ADULT  Goal: Achieves optimal ventilation and oxygenation  Description: INTERVENTIONS:  - Assess for changes in respiratory status  - Assess for changes in mentation and behavior  - Position to facilitate oxygenation and minimize respiratory effort  - Oxygen administered by appropriate delivery if ordered  - Initiate smoking cessation education as indicated  - Encourage broncho-pulmonary hygiene including cough, deep breathe, Incentive Spirometry  - Assess the need for suctioning and aspirate as needed  - Assess and instruct to report SOB or any respiratory difficulty  - Respiratory Therapy support as indicated  Outcome: Progressing     Problem: DISCHARGE PLANNING  Goal: Discharge to home or other facility with appropriate resources  Description: INTERVENTIONS:  - Identify barriers to discharge w/patient and caregiver  - Arrange for needed discharge resources and transportation as appropriate  - Identify discharge learning needs (meds, wound care, etc )  - Arrange for interpretive services to assist at discharge as needed  - Refer to Case Management Department for coordinating discharge planning if the patient needs post-hospital services based on physician/advanced practitioner order or complex needs related to functional status, cognitive ability, or social support system  Outcome: Progressing     Problem: Knowledge Deficit  Goal: Patient/family/caregiver demonstrates understanding of disease process, treatment plan, medications, and discharge instructions  Description: Complete learning assessment and assess knowledge base    Interventions:  - Provide teaching at level of understanding  - Provide teaching via preferred learning methods  Outcome: Progressing     Problem: PAIN - ADULT  Goal: Verbalizes/displays adequate comfort level or baseline comfort level  Description: Interventions:  - Encourage patient to monitor pain and request assistance  - Assess pain using appropriate pain scale  - Administer analgesics based on type and severity of pain and evaluate response  - Implement non-pharmacological measures as appropriate and evaluate response  - Consider cultural and social influences on pain and pain management  - Notify physician/advanced practitioner if interventions unsuccessful or patient reports new pain  Outcome: Progressing     Problem: INFECTION - ADULT  Goal: Absence or prevention of progression during hospitalization  Description: INTERVENTIONS:  - Assess and monitor for signs and symptoms of infection  - Monitor lab/diagnostic results  - Monitor all insertion sites, i e  indwelling lines, tubes, and drains  - Monitor endotracheal if appropriate and nasal secretions for changes in amount and color  - [place] appropriate cooling/warming therapies per order  - Administer medications as ordered  - Instruct and encourage patient and family to use good hand hygiene technique  - Identify and instruct in appropriate isolation precautions for identified infection/condition  Outcome: Progressing     Problem: SAFETY ADULT  Goal: Patient will remain free of falls  Description: INTERVENTIONS:  - Assess patient frequently for physical needs  -  Identify cognitive and physical deficits and behaviors that affect risk of falls    -  [place] fall precautions as indicated by assessment   - Educate patient/family on patient safety including physical limitations  - Instruct patient to call for assistance with activity based on assessment  - Modify environment to reduce risk of injury  - Consider OT/PT consult to assist with strengthening/mobility  Outcome: Progressing  Goal: Maintain or return to baseline ADL function  Description: INTERVENTIONS:  -  Assess patient's ability to carry out ADLs; assess patient's baseline for ADL function and identify physical deficits which impact ability to perform ADLs (bathing, care of mouth/teeth, toileting, grooming, dressing, etc )  - Assess/evaluate cause of self-care deficits   - Assess range of motion  - Assess patient's mobility; develop plan if impaired  - Assess patient's need for assistive devices and provide as appropriate  - Encourage maximum independence but intervene and supervise when necessary  - Involve family in performance of ADLs  - Assess for home care needs following discharge   - Consider OT consult to assist with ADL evaluation and planning for discharge  - Provide patient education as appropriate  Outcome: Progressing  Goal: Maintain or return mobility status to optimal level  Description: INTERVENTIONS:  - Assess patient's baseline mobility status (ambulation, transfers, stairs, etc )    - Identify cognitive and physical deficits and behaviors that affect mobility  - Identify mobility aids required to assist with transfers and/or ambulation (gait belt, sit-to-stand, lift, walker, cane, etc )  - [place] fall precautions as indicated by assessment  - Record patient progress and toleration of activity level on Mobility SBAR; progress patient to next Phase/Stage  - Instruct patient to call for assistance with activity based on assessment  - Consider rehabilitation consult to assist with strengthening/weightbearing, etc   Outcome: Progressing     Problem: Potential for Falls  Goal: Patient will remain free of falls  Description: INTERVENTIONS:  - Assess patient frequently for physical needs  -  Identify cognitive and physical deficits and behaviors that affect risk of falls    -  [place] fall precautions as indicated by assessment   - Educate patient/family on patient safety including physical limitations  - Instruct patient to call for assistance with activity based on assessment  - Modify environment to reduce risk of injury  - Consider OT/PT consult to assist with strengthening/mobility  Outcome: Progressing     Problem: Prexisting or High Potential for Compromised Skin Integrity  Goal: Skin integrity is maintained or improved  Description: INTERVENTIONS:  - Identify patients at risk for skin breakdown  - Assess and monitor skin integrity  - Assess and monitor nutrition and hydration status  - Monitor labs   - Assess for incontinence   - Turn and reposition patient  - Assist with mobility/ambulation  - Relieve pressure over bony prominences  - Avoid friction and shearing  - Provide appropriate hygiene as needed including keeping skin clean and dry  - Evaluate need for skin moisturizer/barrier cream  - Collaborate with interdisciplinary team   - Patient/family teaching  - Consider wound care consult   Outcome: Progressing

## 2021-05-14 NOTE — PROGRESS NOTES
Anurag West  77 y o   male  1945  mrn [id number]    Assessment/Plan:      1  Sepsis/?PCP/? COVID19 infxn with pneumonia/Fever/Leukocytosis/ Hypoxia:    No fevers, clinically improving down to 6L from stress dose steroids  WBC continues to climb probably from stress dose steroids  No diarrhea, to suggest CDIFF  No other complaints  A  Awaiting results of Beta-D-glucan to evaluate for PCP - Pt is too unstable for bronch to eval for PCP and not able to produce sputum specimen for PCR for PCP  B  Will follow WBC count which may stay elevated due to high dose steroid tx  C  D-dimer is back down to 3 90 - cont anticoagulation as per Intensivist service - ?whether Pt is stable enough to undergo another chest CT to look for presence of PE  D    two neg COVID19 tests to take Pt out of isolation - s/p tx with convalescent plasma  E  Cont steroid tx as per Pulmonary  F  Cont supplemental O2 as needed  G  Will follow inflammatory markers           Subjective:  Clinically doing better  Out of bed down to 5L on pulse dose steroids  Objective:    Lungs: decreased  Cor: rrr s1s2  Abd: soft    Labs:  CBC w/diff  Recent Labs     05/13/21  0503 05/14/21  0408   WBC 16 61* 25 06*   HGB 7 8* 8 0*   HCT 26 5* 26 5*    389   NEUTOPHILPCT 89*  --    LYMPHOPCT 6* 8*   MONOPCT 3* 6   EOSPCT 0 0     BMP  Recent Labs     05/14/21  0408   K 4 2   CL 98*   CO2 36*   BUN 36*   CREATININE 1 07   CALCIUM 7 9*     CMP  Recent Labs     05/13/21  0503 05/14/21  0408   K 3 7 4 2   CL 98* 98*   CO2 38* 36*   BUN 29* 36*   CREATININE 0 95 1 07   CALCIUM 7 6* 7 9*   ALKPHOS 139*  --    ALT 31  --    AST 18  --         labrc    Cultures:  Lab Results   Component Value Date    BLOODCX No Growth After 5 Days  05/02/2021    BLOODCX No Growth After 5 Days  05/02/2021    BLOODCX No Growth After 5 Days  04/15/2021    BLOODCX No Growth After 5 Days  04/15/2021    BLOODCX No Growth After 5 Days   04/06/2021    BLOODCX No Growth After 5 Days  04/06/2021    BLOODCX No Growth After 5 Days  04/01/2021    BLOODCX No Growth After 5 Days  04/01/2021    BLOODCX No Growth After 5 Days  02/27/2019    BLOODCX No Growth After 5 Days  02/27/2019    BLOODCX No Growth After 5 Days  11/25/2018    BLOODCX No Growth After 5 Days   11/25/2018     No results found for: WOUNDCULT  No results found for: URINECX  No results found for: SPUTUMCULTUR    MED:  reviewed      Current Facility-Administered Medications:     acetaminophen (TYLENOL) tablet 650 mg, 650 mg, Oral, Q6H PRN, DUNG Best    aspirin chewable tablet 81 mg, 81 mg, Oral, Daily, DUNG Best, 81 mg at 05/14/21 0736    atorvastatin (LIPITOR) tablet 40 mg, 40 mg, Oral, Daily With Dinner, DUNG Best, 40 mg at 05/13/21 1742    cholecalciferol (VITAMIN D3) tablet 2,000 Units, 2,000 Units, Oral, Daily, DUNG Best, 2,000 Units at 05/14/21 0733    enoxaparin (LOVENOX) subcutaneous injection 70 mg, 1 mg/kg, Subcutaneous, Q12H [facility], Charla DUNG Nuñez, 70 mg at 05/14/21 7420    ferrous sulfate tablet 325 mg, 325 mg, Oral, Daily With Breakfast, DUNG Best, 325 mg at 05/14/21 0732    furosemide (LASIX) injection 40 mg, 40 mg, Intravenous, BID (diuretic), Carletta Oppenheim, CRNP, 40 mg at 05/14/21 0732    insulin lispro (HumaLOG) 100 units/mL subcutaneous injection 3 Units, 3 Units, Subcutaneous, TID With Meals, Carletta Oppenheim, CRNP    insulin regular (HumuLIN R,NovoLIN R) 1 Units/mL in sodium chloride 0 9 % 100 mL infusion, 0 3-21 Units/hr, Intravenous, Titrated, DUNG Nunez, Last Rate: 6 mL/hr at 05/14/21 0943, 6 Units/hr at 05/14/21 0943    lisinopril (ZESTRIL) tablet 20 mg, 20 mg, Oral, Daily, DUNG Best, 20 mg at 05/14/21 0749    melatonin tablet 6 mg, 6 mg, Oral, HS, DUNG Best, 6 mg at 05/13/21 2147    methylPREDNISolone sodium succinate (Solu-MEDROL) injection 125 mg, 125 mg, Intravenous, Q6H [facility], DUNG Morgan    [START ON 5/15/2021] methylPREDNISolone sodium succinate (Solu-MEDROL) injection 60 mg, 60 mg, Intravenous, Q6H DONNIE, Alejandra Wellington, DUNG    metoprolol succinate (TOPROL-XL) 24 hr tablet 100 mg, 100 mg, Oral, Daily, Evelene Ray, CRNP, 100 mg at 05/14/21 8366    multivitamin-minerals (CENTRUM ADULTS) tablet 1 tablet, 1 tablet, Oral, Daily, Evelene Ray, CRNP, 1 tablet at 05/14/21 0732    nitroglycerin (NITROSTAT) SL tablet 0 4 mg, 0 4 mg, Sublingual, Q5 Min PRN, Evelene Ray, CRNP    ondansetron [facility]) injection 4 mg, 4 mg, Intravenous, Q6H PRN, Evelene Ray, CRNP    pantoprazole (PROTONIX) EC tablet 40 mg, 40 mg, Oral, Early Morning, Evelene Ray, CRNP, 40 mg at 05/14/21 0542    polyethylene glycol (MIRALAX) packet 17 g, 17 g, Oral, Daily PRN, Dot Rodas, CRNP    saccharomyces boulardii (FLORASTOR) capsule 250 mg, 250 mg, Oral, BID, Evelene Ray, CRNP, 250 mg at 05/14/21 5645    simethicone (MYLICON) chewable tablet 80 mg, 80 mg, Oral, Q6H PRN, Evelene Ray, CRNP, 80 mg at 05/08/21 1101    sulfamethoxazole-trimethoprim (BACTRIM DS) 800-160 mg per tablet 2 tablet, 2 tablet, Oral, Q8H [address], Klaudia Mujica MD, 2 tablet at 05/14/21 0542    Principal Problem:    Acute respiratory failure with hypoxia [facility])  Active Problems:    Coronary disease    Type 2 diabetes mellitus without complication, without long-term current use of insulin (HCC)    Lymphoma ([facility] [address] )    Essential hypertension    Chronic diastolic congestive heart failure (HCC)    Leukocytosis    [facility] fibrillation ([facility] )    Gastroesophageal reflux disease with esophagitis    Pneumonia due to COVID-19 virus    Anemia      Arslan Ace MD

## 2021-05-14 NOTE — OCCUPATIONAL THERAPY NOTE
Occupational Therapy Evaluation (9:00-9:15) & Treat (9:16-9:26)     Patient Name: Mark Allen  Today's Date: 5/14/2021  Problem List  Principal Problem:    Acute respiratory failure with hypoxia ([facility] [address] )  Active Problems:    Coronary disease    Type 2 diabetes mellitus without complication, without long-term current use of insulin (HCC)    Lymphoma (HCC)    Essential hypertension    Chronic diastolic congestive heart failure (HCC)    Leukocytosis    Paroxysmal atrial fibrillation (HCC)    Gastroesophageal reflux disease with esophagitis    Pneumonia due to COVID-19 virus    Anemia    Past Medical History  Past Medical History:   Diagnosis Date    Arthritis     Atrial fibrillation ([facility] [address] )     Cataract     Colitis     Colon cancer ([facility] [address] )     Diabetes mellitus type II, non insulin dependent ([facility] [address] )     Fatty liver     History of chemotherapy     History of radiation therapy     History of shingles 2018    Hypertension     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[address] )     Pneumonia     Skin cancer      Past Surgical History  Past Surgical History:   Procedure Laterality Date    AORTIC VALVE REPLACEMENT      COLECTOMY      COLONOSCOPY  11/2019    Prior right hemicolectomy    CORONARY ARTERY BYPASS GRAFT      DENTAL SURGERY      KNEE SURGERY      LYMPHADENECTOMY      OTHER SURGICAL HISTORY      MAZE PROCEDURE    [place] TOTAL KNEE ARTHROPLASTY Left 1/28/2019    Procedure: ARTHROPLASTY LEFT KNEE TOTAL;  Surgeon: Tucker Werner MD;  Location:  MAIN OR;  Service: Orthopedics    SKIN BIOPSY      UPPER GASTROINTESTINAL ENDOSCOPY  11/2019    Schaki ring         05/14/21 0926   OT Last Visit   OT Visit Date 05/14/21   Note Type   Note type Evaluation  (& Treat)   Restrictions/Precautions   Weight Bearing Precautions Per Order No   Other Precautions O2;Multiple lines;Telemetry   Pain Assessment   Pain Assessment Tool Pain Assessment not indicated - pt denies pain   Home Living   Type of [address] One level  (1 MIGUEL ANGEL (55+ community))   Bathroom Shower/Tub Tub/shower unit   Bathroom Toilet Standard   Bathroom Equipment Built-in shower seat;Grab bars in shower;Grab bars around toilet   [address]   (relies on no AD for mobility at baseline  )   Prior Function   Level of [place] Independent with ADLs and functional mobility   Lives With Spouse   ADL Assistance Independent   IADLs Needs assistance   Falls in the last 6 months 0   Vocational Part time employment   Comments Pt drives   Psychosocial   Psychosocial (WDL) WDL   Subjective   Subjective Pt received seated in recliner  Pt agreeable to session   ADL   Eating Assistance 7  Independent   Grooming Assistance 7  Independent   UB Bathing Assistance 5  Supervision/Setup   LB Bathing Assistance 5  Supervision/Setup   UB Dressing Assistance 5  Supervision/Setup   LB Dressing Assistance 5  Supervision/Setup   Toileting Assistance  5  Supervision/Setup   Bed Mobility   Additional Comments Pt received in semisupine position  Pt agreeable to session  Transfers   Sit to Stand 7  Independent   Stand to Sit 7  Independent   Stand pivot 5  Supervision   Functional Mobility   Functional Mobility 5  Supervision   Additional Comments using IV pole   Balance   Static Sitting Normal   Dynamic Sitting Good   Static Standing Fair +   Dynamic Standing Fair +   Activity Tolerance   Activity Tolerance Patient tolerated treatment well   RUE Assessment   RUE Assessment WFL   LUE Assessment   LUE Assessment WFL   Cognition   Overall Cognitive Status WFL   Arousal/Participation Alert   Attention Within functional limits   Orientation Level Oriented X4   Memory Within functional limits   Following Commands Follows all commands and directions without difficulty   Assessment   Assessment Pt is a 77 y o  male seen for OT evaluation at [address] [place] [address], admitted 5/2/2021 w/ Acute respiratory failure with hypoxia ([facility] [address] )    OT completed extensive review of pt's medical and social history  Comorbidities affecting pt's functional performance at time of assessment include: lymphoma, essential HTN, anemia, chronic diastolic CHF, hx of CABG, etc (see chart for additional hx)  Pt with active OT orders  Prior to admission, pt was living with wife in [facility]  Pt was I w/  ADLS and required some assist IADLS, (+) drove, & required no use of DME PTA  Upon evaluation: Pt requires sup-independent for functional mobility/transfers, set-up for UB ADLs and set-up for LB ADLS  Pt tolerated standing ADL (see treatment note below)  Pt appears to be at/close to baseline  No further OT needs indicated at this time  Based on findings, pt is of high complexity  The patient's raw score on the AM-PAC Daily Activity inpatient short form is 24, standardized score is 57 54, greater than 39 4  Patients at this level are likely to benefit from DC to home  Please refer to the recommendation of the Occupational Therapist for safe DC planning  At this time, OT recommendations at time of discharge are home with continued social support  Goals   Patient Goals Pt wishes to get home   Plan   OT Frequency Eval only   Additional Treatment Session   Treatment Assessment Time In: 09:16  Time Out: 09:26  Total Time: 10 min        S:  Pt seated in chair  Pt agreeable to perform ADLs in bathroom  O:  Additional functional mobility and ADL training performed  Performed sit<>stand transfers with independence  Performed functional mobility into bathroom with supervision while pushing IV pole  Performed grooming at sink with supervision/set-up  Occasional VC provided to maximize appropriate body mechanics  Returned to recliner with supervision with IV pole  A:  Additional follow up consecutive session performed to facilitate ADL management  Pt demonstrate good endurance t/o activity  P:  No further OT needs indicated at this time  D/C OT     Recommendation   OT Discharge Recommendation No rehabilitation needs   AM-PAC Daily Activity Inpatient   Lower Body Dressing 4   Bathing 4   Toileting 4   Upper Body Dressing 4   Grooming 4   Eating 4   Daily Activity Raw Score 24   Daily Activity Standardized Score (Calc for Raw Score >=11) 57 54   AM-PAC Applied Cognition Inpatient   Following a Speech/Presentation 4   Understanding Ordinary Conversation 4   Taking Medications 4   Remembering Where Things Are Placed or Put Away 4   Remembering List of 4-5 Errands 4   Taking Care of Complicated Tasks 4   Applied Cognition Raw Score 24   Applied Cognition Standardized Score 62 21         Saritha Woodward OTR/L

## 2021-05-14 NOTE — PROGRESS NOTES
[place]  Progress Note - Alondra Luis 1945, 77 y o  male MRN: [id number]  Unit/Bed#: -01 Encounter: [id number]  Primary Care Provider: Hailey Otero DO   Date and time admitted to hospital: 5/2/2021  2:28 PM    Pneumonia due to COVID-19 virus  Assessment & Plan  · Covid + on 4/6  · Has had two admissions since, 4/6 to 4/10 and 4/15 to 4/20  CXR GGO  CTA neg PE worsening consolidation/b/l pleural effusions  severe COVID-19 tx algorithm/Positive 4/6  IS  Self prone  MEDICATIONS  Solumedrol 125mg q 6hrs D2/3  Levaquin completed 5/8  Bactrim D2 given concern for PCP pna   Atorvastatin home dose   Enoxaparin therapeutic  Remdesimir D7/10   Plasma na  Actemra na  INFLAMMATORY MARKERS (trend q 48-72)  CRP 19--from [id number]  Ferritin 465--peak 529  D-Dimer 3 9---peak7 9  Troponin 0 05  BNP 6500 from [id number]  CK 24  PCT 0 13    * Acute respiratory failure with hypoxia (HCC)  Assessment & Plan  · Secondary to COVID-19 versus PCP PNA vs fibrosis  · Admit 5/2 transferred on 05/12 for high-flow nasal cannula  · CTA -PE, worsening bilateral infiltrates and pleural effusions  · Recently had PET scan completed an outpatient which shows no lymph nodes eliminated but reactive inflammatory response versus tumors  · ID consulted and appreciate recs  · Solumedrol 125mg q 6hrs D2/3  · Bactrim D2  · 5/8 levaquin completed  · Decrease diuretics to bID given elevated bicarb contraction alkalosis and o2 improvements  · HFNC 40/40 Titrate FiO2 to keep O2 sats 88% or greater  · Continue severe COVID-19 protocol    Essential hypertension  Assessment & Plan  · Continue Toprol/lisinopril    Type 2 diabetes mellitus without complication, without long-term current use of insulin [facility])  Assessment & Plan  Lab Results   Component Value Date    HGBA1C 5 8 (H) 12/30/2020       Recent Labs     05/12/21  1619 05/12/21  2105 05/13/21  0538 05/13/21  0736   POCGLU 418* 496* 287* 282*       Blood Sugar Average: Last 72 hrs:  (P) 294 [id number]     · Holding home metformin  · Started on insulin gtt      Anemia  Assessment & Plan  · Baseline 10  · No s/s acute bleeding  · Check full w/u occult  · Cont home iron    Gastroesophageal reflux disease with esophagitis  Assessment & Plan  · ppi home    Paroxysmal atrial fibrillation (HCC)  Assessment & Plan  · Patient is on Eliquis as outpatient  · Rate controlled on Lopressor  · Changed to lovenox inpt    Chronic diastolic congestive heart failure (HCC)  Assessment & Plan  Wt Readings from Last 3 Encounters:   05/13/21 67 4 kg (148 lb 9 4 oz)   04/20/21 73 2 kg (161 lb 6 oz)   04/10/21 76 8 kg (169 lb 5 oz)       · Echocardiogram shows EF 40% diffuse hypokinesis bio AVR from EF 60% in 2019  · BNP 11000--down to 6500  · Decrease Lasix 40mg IV BID given contraction alkalosis no evidence of edema/JVD  · CTA b/l Pleural effusions greater on right  · Neg 1 6L goal -500 today to even  · I/o  · Daily weight      Lymphoma ([address] )  Assessment & Plan  · Follows with Dr Kirill Zimmer at [facility] [phone]  · Patient is considering lung biopsy, however he is currently not stable enough to undergo biopsy  · PET scan done at [facility] concerning for limiting areas in question of possibility to of reactive inflammatory response versus tumors  · Patient will need outpatient follow-up with oncologist  · Received IVIG on 05/06  · Last chemo 2015  · Consider bronch if decreased to midflow/NC to r/o lymph spread of cancer vs fibrosis    Coronary disease  Assessment & Plan  · Status post CABG  · Continue aspirin, statin, beta-blocker, aCEI      ----------------------------------------------------------------------------------------  HPI/24hr events: Oxygen weaned down to 6 L NC  Slept well overnight without any acute events      Disposition: Transfer to Stepdown Level 2  Code Status: Level 1 - Full Code  ---------------------------------------------------------------------------------------  SUBJECTIVE  " I feel great " Denied increased shortness of breath  Review of Systems  Review of systems was reviewed and negative unless stated above in HPI/24-hour events   ---------------------------------------------------------------------------------------  OBJECTIVE    Vitals   Vitals:    21 1833 21 1937 21 19421 2330   BP: (!) 100/49   103/55   BP Location: Right arm   Right arm   Pulse: 65   57   Resp: 20   22   Temp: 97 5 °F (36 4 °C)   97 5 °F (36 4 °C)   TempSrc: Oral   Oral   SpO2: 97% 98% 97% 95%   Weight:       Height:         Temp (24hrs), Av 9 °F (36 1 °C), Min:96 1 °F (35 6 °C), Max:97 5 °F (36 4 °C)  Current: Temperature: 97 5 °F (36 4 °C)          Respiratory:    Nasal Cannula O2 Flow Rate (L/min): 6 L/min    Invasive/non-invasive ventilation settings   Respiratory    Lab Data (Last 4 hours)    None         O2/Vent Data (Last 4 hours)    None                Physical Exam  Constitutional:       Appearance: He is not toxic-appearing  HENT:      Nose: Nose normal       Mouth/Throat:      Mouth: Mucous membranes are moist    Eyes:      Extraocular Movements: Extraocular movements intact  Conjunctiva/sclera: Conjunctivae normal       Pupils: Pupils are equal, round, and reactive to light  Neck:      Musculoskeletal: Normal range of motion  Cardiovascular:      Rate and Rhythm: Normal rate and regular rhythm  Pulses: Normal pulses  Heart sounds: Normal heart sounds  Pulmonary:      Effort: Pulmonary effort is normal  No respiratory distress  Breath sounds: Normal breath sounds  Comments: Diminished breath sounds mahad bases  Abdominal:      General: Abdomen is flat  Bowel sounds are normal  There is no distension  Palpations: Abdomen is soft  Tenderness: There is no abdominal tenderness  Musculoskeletal: Normal range of motion  Skin:     General: Skin is warm and dry  Capillary Refill: Capillary refill takes 2 to 3 seconds  Neurological:      Mental Status: He is alert and oriented to person, place, and time  Mental status is at baseline  Psychiatric:         Mood and Affect: Mood normal          Behavior: Behavior normal          Thought Content:  Thought content normal          Judgment: Judgment normal          Laboratory and Diagnostics:  Results from last 7 days   Lab Units 05/13/21  0503 05/12/21  0329 05/11/21  0832 05/10/21  0531 05/09/21  0332 05/08/21  1249 05/07/21  0557   WBC Thousand/uL 16 61* 17 80* 14 73* 13 47* 12 13* 10 21* 10 06   HEMOGLOBIN g/dL 7 8* 8 2* 8 1* 8 2* 7 5* 8 3* 7 4*   HEMATOCRIT % 26 5* 27 4* 27 3* 27 6* 24 6* 27 4* 24 6*   PLATELETS Thousands/uL 280 327 191 332 260 257 199   NEUTROS PCT % 89* 88* 81* 87* 89* 90* 79*   MONOS PCT % 3* 4 7 6 5 5 9     Results from last 7 days   Lab Units 05/13/21  0503 05/12/21  0329 05/11/21  0832 05/10/21  0531 05/09/21  0334 05/08/21  1249 05/07/21  0557   SODIUM mmol/L 142 142 142 141 137 137 138   POTASSIUM mmol/L 3 7 4 2 3 6 3 6 3 6 3 6 3 2*   CHLORIDE mmol/L 98* 101 101 100 97* 96* 98*   CO2 mmol/L 38* 31 37* 34* 34* 33* 31   ANION GAP mmol/L 6 10 4 7 6 8 9   BUN mg/dL 29* 27* 30* 31* 32* 26* 14   CREATININE mg/dL 0 95 0 96 1 07 1 11 1 12 1 05 1 00   CALCIUM mg/dL 7 6* 7 9* 7 9* 8 0* 8 0* 8 2* 7 7*   GLUCOSE RANDOM mg/dL 246* 220* 164* 168* 248* 272* 117   ALT U/L 31 33 38 42 42 33 25   AST U/L 18 18 23 28 44 31 24   ALK PHOS U/L 139* 142* 120* 124* 127* 104 99   ALBUMIN g/dL 2 5* 2 4* 2 0* 2 1* 1 6* 1 8* 1 7*   TOTAL BILIRUBIN mg/dL 0 80 0 90 0 80 0 70 0 60 0 80 0 90     Results from last 7 days   Lab Units 05/13/21  0503   MAGNESIUM mg/dL 2 3                   ABG:    VBG:    Results from last 7 days   Lab Units 05/13/21  0503 05/12/21  1139   PROCALCITONIN ng/ml 0 16 0 13       Micro        EKG: NSR with rate 65 on tele  Imaging: I have personally reviewed pertinent films in PACS    Intake and Output  I/O       05/12 0701 - 05/13 0700 05/13 0701 - 05/14 0700    P  O  1762 1830    I V  (mL/kg) 62 (0 9) 219 6 (3 3)    IV Piggyback 250     Total Intake(mL/kg) 2074 (30 8) 2049 6 (30 4)    Urine (mL/kg/hr) 3700 (2 3) 1750 (1 1)    Total Output 3700 1750    Net -1626 +299 6                Height and Weights   Height: 5' 11" (180 3 cm)  IBW (Ideal Body Weight): 75 3 kg  Body mass index is 20 72 kg/m²  Weight (last 2 days)     Date/Time   Weight    05/13/21 0540   67 4 (148 59)                Nutrition       Diet Orders   (From admission, onward)             Start     Ordered    05/13/21 0047  Diet Shaw/CHO Controlled; Consistent Carbohydrate Diet Level 2 (5 carb servings/75 grams CHO/meal)  Diet effective now     Question Answer Comment   Diet Type Shaw/CHO Controlled    Shaw/CHO Controlled Consistent Carbohydrate Diet Level 2 (5 carb servings/75 grams CHO/meal)    RD to adjust diet per protocol?  Yes        05/13/21 0046                  Active Medications  Scheduled Meds:  Current Facility-Administered Medications   Medication Dose Route Frequency Provider Last Rate    acetaminophen  650 mg Oral Q6H PRN Gearldine Finely, CRNP      aspirin  81 mg Oral Daily Gearldine Finely, CRNP      atorvastatin  40 mg Oral Daily With [facility] DUNG Zhu      cholecalciferol  2,000 Units Oral Daily Gearldine Finely, CRNP      enoxaparin  1 mg/kg Subcutaneous Q12H [facility] DUNG Metcalf      ferrous sulfate  325 mg Oral Daily With Breakfast Gearldine Finely, CRNP      furosemide  40 mg Intravenous BID (diuretic) DUNG Doe      insulin lispro  10 Units Subcutaneous TID With Meals DUNG Doe      insulin regular (HumuLIN R,NovoLIN R) infusion  0 3-21 Units/hr Intravenous Titrated DUNG Nunez 1 Units/hr (05/13/21 2336)    lisinopril  20 mg Oral Daily Gearldine Finely, CRNP      melatonin  6 mg Oral HS Gearldine Finely, CRNP      methylPREDNISolone sodium succinate  125 mg Intravenous Q6H [facility] Jossie Graham, DUNG      metoprolol succinate  100 mg Oral Daily Kacie Li, CRNP      multivitamin-minerals  1 tablet Oral Daily Kacie Li, [address]      nitroglycerin  0 4 mg Sublingual Q5 Min PRN Kacie Li, GRACIANP      ondansetron  4 mg Intravenous Q6H PRN Kacie Li, CRNP      pantoprazole  40 mg Oral Early Morning Kacie Li, CRNP      polyethylene glycol  17 g Oral Daily Martha Arnold, CRNP      saccharomyces boulardii  250 mg Oral BID Kacie Li, CRNP      senna-docusate sodium  2 tablet Oral BID Martha Arnold, CRNP      simethicone  80 mg Oral Q6H PRN Kacie Li, CRNP      sulfamethoxazole-trimethoprim  2 tablet Oral [facility] Loreta De Jesus MD       Continuous Infusions:  insulin regular (HumuLIN R,NovoLIN R) infusion, 0 3-21 Units/hr, Last Rate: 1 Units/hr (05/13/21 9239)      PRN Meds:   acetaminophen, 650 mg, Q6H PRN  nitroglycerin, 0 4 mg, Q5 Min PRN  ondansetron, 4 mg, Q6H PRN  simethicone, 80 mg, Q6H PRN        Invasive Devices Review  Invasive Devices     Peripheral Intravenous Line            Peripheral IV 05/13/21 Right;Ventral (anterior) Forearm less than 1 day                Rationale for remaining devices: N/A  ---------------------------------------------------------------------------------------  Advance Directive and Living Will:      Power of :    POLST:    ---------------------------------------------------------------------------------------  Care Time Delivered:   No Critical Care time spent       DUNG Barahona      Portions of the record may have been created with voice recognition software  Occasional wrong word or "sound a like" substitutions may have occurred due to the inherent limitations of voice recognition software    Read the chart carefully and recognize, using context, where substitutions have occurred

## 2021-05-14 NOTE — PHYSICAL THERAPY NOTE
PT eval   05/14/21 0928   PT Last Visit   PT Visit Date 05/14/21   Note Type   Note type Evaluation  (tx)   Pain Assessment   Pain Assessment Tool Pain Assessment not indicated - pt denies pain   Home Living   Type of [address] One level   Bathroom Accessibility Accessible   Home Equipment Walker   Prior Function   Level of [place] Independent with ADLs and functional mobility   Lives With Spouse   ADL Assistance Independent   IADLs Needs assistance   Falls in the last 6 months 0   Vocational Part time employment   Comments drives   Restrictions/Precautions   Weight Bearing Precautions Per Order No   Other Precautions O2;Multiple lines   General   Additional Pertinent History readm with respiratory issues, hx of lymphoma and covid, head home 02   Family/Caregiver Present No   Cognition   Overall Cognitive Status WFL   Arousal/Participation Alert   Orientation Level Oriented X4   Memory Within functional limits   Following Commands Follows all commands and directions without difficulty   RUE Assessment   RUE Assessment WFL   LUE Assessment   LUE Assessment WFL   RLE Assessment   RLE Assessment WFL   LLE Assessment   LLE Assessment WFL   Proprioception   RLE Proprioception Grossly intact   LLE Proprioception Grossly Intact   Bed Mobility   Rolling R 7  Independent   Rolling L 7  Independent   Supine to Sit 7  Independent   Sit to Supine 7  Independent   Additional Comments report sleeps in bed no difficulty   Transfers   Sit to Stand 7  Independent   Stand to Sit 7  Independent   Stand pivot 5  Supervision   Additional items   (pushes iv pole assist for 02 line)   Ambulation/Elevation   Gait pattern WNL   Gait Assistance 5  Supervision   Additional items Assist x 1  (lines)   Assistive Device None  (or pushes Iv pole)   Distance 40'x2   Balance   Static Sitting Normal   Dynamic Sitting Good   Static Standing Fair +   Dynamic Standing Fair +   Ambulatory Fair +   Endurance Deficit   Endurance Deficit Yes   Endurance Deficit Description desats slightly on 6L 02 without AD  Activity Tolerance   Activity Tolerance Patient tolerated treatment well   Medical Staff Made Aware OT Kirk   Nurse Made Aware RN Curt   Assessment   Prognosis Good   Problem List Decreased endurance   Assessment Pt able to amb to and from BR and stand for ADLS with OT on 6L 02   desats slgihtly but no sob  APpears to pace well  Encouraged to increase amb to and from BR and in room as daniel  No further skilled needs at this time  agreeable to home PT as PTA  d/c PT   Barriers to Discharge   (medical status)   Goals   Patient Goals get better go home   Plan   PT Frequency   (d/c PT)   Recommendation   PT Discharge Recommendation Home with home health rehabilitation   PT - OK to Discharge Yes   Additional Comments   (once medically cleared)   AM-PAC Basic Mobility Inpatient   Turning in Bed Without Bedrails 4   Lying on Back to Sitting on Edge of Flat Bed 4   Moving Bed to Chair 4   Standing Up From Chair 4   Walk in Room 4   Climb 3-5 Stairs 4   Basic Mobility Inpatient Raw Score 24   Basic Mobility Standardized Score 57 68   Modified Campos Scale   Modified [place] Scale 2   Roseline Hunt, PT  PT tx  Time in 0915  Time out 0928  PT tx continues for gait training wihtout AD on 6L 02 able to amb without asisst 50' no sob  )@ sat 89% then recovers with seated rest  No further needs at this time   D/c PT

## 2021-05-14 NOTE — ASSESSMENT & PLAN NOTE
· Covid + on 4/6  · Has had two admissions since, 4/6 to 4/10 and 4/15 to 4/20  CXR GGO  CTA neg PE worsening consolidation/b/l pleural effusions  severe COVID-19 tx algorithm/Positive 4/6  IS  Self prone  MEDICATIONS  Solumedrol 125mg q 6hrs D3/3, transitioned to solumedrol 60 mg BID today  Levaquin completed 5/8  Bactrim D4 given concern for PCP pna   Atorvastatin home dose   Enoxaparin therapeutic  Completed Remdesivir 6 doses   Plasma 5/7  Actemra na  INFLAMMATORY MARKERS (trend q 48-72)  CRP 15--from [id number]  Ferritin 331--peak 529  D-Dimer 3 2---peak7 9  Troponin 0 05  BNP 6500 from [id number]  CK 24  PCT 0 13

## 2021-05-14 NOTE — ACP (ADVANCE CARE PLANNING)
Notified by RN that patient wished to change his code status from a level 3 Do not resuscitate/do not intubate to full code  We discussed his current diagnoses and treatment plan  He stated his breathing is improving and that if his breathing would become worse he would like to be intubated  Patient's code status change to level 1 full code

## 2021-05-15 ENCOUNTER — APPOINTMENT (INPATIENT)
Dept: RADIOLOGY | Facility: HOSPITAL | Age: 76
DRG: 871 | End: 2021-05-15
Payer: COMMERCIAL

## 2021-05-15 ENCOUNTER — APPOINTMENT (INPATIENT)
Dept: GASTROENTEROLOGY | Facility: HOSPITAL | Age: 76
DRG: 871 | End: 2021-05-15
Attending: INTERNAL MEDICINE
Payer: COMMERCIAL

## 2021-05-15 PROBLEM — D62 ACUTE ON CHRONIC BLOOD LOSS ANEMIA: Status: ACTIVE | Noted: 2021-05-13

## 2021-05-15 PROBLEM — R04.89 PULMONARY ALVEOLAR HEMORRHAGE: Status: ACTIVE | Noted: 2021-05-15

## 2021-05-15 LAB
ABO GROUP BLD: NORMAL
ANION GAP SERPL CALCULATED.3IONS-SCNC: 5 MMOL/L (ref 4–13)
BLD GP AB SCN SERPL QL: NEGATIVE
BUN SERPL-MCNC: 43 MG/DL (ref 5–25)
CALCIUM SERPL-MCNC: 7.7 MG/DL (ref 8.3–10.1)
CHLORIDE SERPL-SCNC: 99 MMOL/L (ref 100–108)
CO2 SERPL-SCNC: 36 MMOL/L (ref 21–32)
CREAT SERPL-MCNC: 1.12 MG/DL (ref 0.6–1.3)
ERYTHROCYTE [DISTWIDTH] IN BLOOD BY AUTOMATED COUNT: 21.2 % (ref 11.6–15.1)
GFR SERPL CREATININE-BSD FRML MDRD: 64 ML/MIN/1.73SQ M
GLUCOSE SERPL-MCNC: 114 MG/DL (ref 65–140)
GLUCOSE SERPL-MCNC: 159 MG/DL (ref 65–140)
GLUCOSE SERPL-MCNC: 239 MG/DL (ref 65–140)
GLUCOSE SERPL-MCNC: 286 MG/DL (ref 65–140)
GLUCOSE SERPL-MCNC: 361 MG/DL (ref 65–140)
GLUCOSE SERPL-MCNC: 86 MG/DL (ref 65–140)
GLUCOSE SERPL-MCNC: 96 MG/DL (ref 65–140)
HCT VFR BLD AUTO: 23.9 % (ref 36.5–49.3)
HGB BLD-MCNC: 6.7 G/DL (ref 12–17)
HGB BLD-MCNC: 7 G/DL (ref 12–17)
HGB BLD-MCNC: 7.5 G/DL (ref 12–17)
MCH RBC QN AUTO: 23.6 PG (ref 26.8–34.3)
MCHC RBC AUTO-ENTMCNC: 29.3 G/DL (ref 31.4–37.4)
MCV RBC AUTO: 81 FL (ref 82–98)
NRBC BLD AUTO-RTO: 0 /100 WBCS
PLATELET # BLD AUTO: 287 THOUSANDS/UL (ref 149–390)
PMV BLD AUTO: 10.8 FL (ref 8.9–12.7)
POTASSIUM SERPL-SCNC: 4.3 MMOL/L (ref 3.5–5.3)
PROCALCITONIN SERPL-MCNC: 0.1 NG/ML
RBC # BLD AUTO: 2.96 MILLION/UL (ref 3.88–5.62)
RH BLD: POSITIVE
SODIUM SERPL-SCNC: 140 MMOL/L (ref 136–145)
SPECIMEN EXPIRATION DATE: NORMAL
WBC # BLD AUTO: 19.18 THOUSAND/UL (ref 4.31–10.16)

## 2021-05-15 PROCEDURE — 82948 REAGENT STRIP/BLOOD GLUCOSE: CPT

## 2021-05-15 PROCEDURE — 87070 CULTURE OTHR SPECIMN AEROBIC: CPT | Performed by: INTERNAL MEDICINE

## 2021-05-15 PROCEDURE — 87281 PNEUMOCYSTIS CARINII AG IF: CPT | Performed by: INTERNAL MEDICINE

## 2021-05-15 PROCEDURE — 80048 BASIC METABOLIC PNL TOTAL CA: CPT | Performed by: INTERNAL MEDICINE

## 2021-05-15 PROCEDURE — 71045 X-RAY EXAM CHEST 1 VIEW: CPT

## 2021-05-15 PROCEDURE — P9016 RBC LEUKOCYTES REDUCED: HCPCS

## 2021-05-15 PROCEDURE — 85027 COMPLETE CBC AUTOMATED: CPT | Performed by: INTERNAL MEDICINE

## 2021-05-15 PROCEDURE — 86038 ANTINUCLEAR ANTIBODIES: CPT | Performed by: INTERNAL MEDICINE

## 2021-05-15 PROCEDURE — 86430 RHEUMATOID FACTOR TEST QUAL: CPT | Performed by: INTERNAL MEDICINE

## 2021-05-15 PROCEDURE — 88184 FLOWCYTOMETRY/ TC 1 MARKER: CPT | Performed by: INTERNAL MEDICINE

## 2021-05-15 PROCEDURE — 87206 SMEAR FLUORESCENT/ACID STAI: CPT | Performed by: INTERNAL MEDICINE

## 2021-05-15 PROCEDURE — 87252 VIRUS INOCULATION TISSUE: CPT | Performed by: INTERNAL MEDICINE

## 2021-05-15 PROCEDURE — 30233N1 TRANSFUSION OF NONAUTOLOGOUS RED BLOOD CELLS INTO PERIPHERAL VEIN, PERCUTANEOUS APPROACH: ICD-10-PCS | Performed by: INTERNAL MEDICINE

## 2021-05-15 PROCEDURE — 88305 TISSUE EXAM BY PATHOLOGIST: CPT | Performed by: PATHOLOGY

## 2021-05-15 PROCEDURE — 89051 BODY FLUID CELL COUNT: CPT | Performed by: PATHOLOGY

## 2021-05-15 PROCEDURE — 86225 DNA ANTIBODY NATIVE: CPT | Performed by: INTERNAL MEDICINE

## 2021-05-15 PROCEDURE — 87015 SPECIMEN INFECT AGNT CONCNTJ: CPT | Performed by: INTERNAL MEDICINE

## 2021-05-15 PROCEDURE — 99232 SBSQ HOSP IP/OBS MODERATE 35: CPT | Performed by: INTERNAL MEDICINE

## 2021-05-15 PROCEDURE — 86850 RBC ANTIBODY SCREEN: CPT | Performed by: NURSE PRACTITIONER

## 2021-05-15 PROCEDURE — 87556 M.TUBERCULO DNA AMP PROBE: CPT | Performed by: INTERNAL MEDICINE

## 2021-05-15 PROCEDURE — 94760 N-INVAS EAR/PLS OXIMETRY 1: CPT

## 2021-05-15 PROCEDURE — 85018 HEMOGLOBIN: CPT | Performed by: NURSE PRACTITIONER

## 2021-05-15 PROCEDURE — 0B9D8ZX DRAINAGE OF RIGHT MIDDLE LUNG LOBE, VIA NATURAL OR ARTIFICIAL OPENING ENDOSCOPIC, DIAGNOSTIC: ICD-10-PCS | Performed by: INTERNAL MEDICINE

## 2021-05-15 PROCEDURE — 86200 CCP ANTIBODY: CPT | Performed by: INTERNAL MEDICINE

## 2021-05-15 PROCEDURE — 88185 FLOWCYTOMETRY/TC ADD-ON: CPT | Performed by: INTERNAL MEDICINE

## 2021-05-15 PROCEDURE — 86431 RHEUMATOID FACTOR QUANT: CPT | Performed by: INTERNAL MEDICINE

## 2021-05-15 PROCEDURE — 86235 NUCLEAR ANTIGEN ANTIBODY: CPT | Performed by: INTERNAL MEDICINE

## 2021-05-15 PROCEDURE — 88112 CYTOPATH CELL ENHANCE TECH: CPT | Performed by: PATHOLOGY

## 2021-05-15 PROCEDURE — 84145 PROCALCITONIN (PCT): CPT | Performed by: INTERNAL MEDICINE

## 2021-05-15 PROCEDURE — 86901 BLOOD TYPING SEROLOGIC RH(D): CPT | Performed by: NURSE PRACTITIONER

## 2021-05-15 PROCEDURE — 31624 DX BRONCHOSCOPE/LAVAGE: CPT | Performed by: INTERNAL MEDICINE

## 2021-05-15 PROCEDURE — 87116 MYCOBACTERIA CULTURE: CPT | Performed by: INTERNAL MEDICINE

## 2021-05-15 PROCEDURE — 83520 IMMUNOASSAY QUANT NOS NONAB: CPT | Performed by: INTERNAL MEDICINE

## 2021-05-15 PROCEDURE — 86255 FLUORESCENT ANTIBODY SCREEN: CPT | Performed by: INTERNAL MEDICINE

## 2021-05-15 PROCEDURE — 86923 COMPATIBILITY TEST ELECTRIC: CPT

## 2021-05-15 PROCEDURE — 86900 BLOOD TYPING SEROLOGIC ABO: CPT | Performed by: NURSE PRACTITIONER

## 2021-05-15 RX ORDER — FERROUS SULFATE 325(65) MG
325 TABLET ORAL 2 TIMES DAILY WITH MEALS
Status: DISCONTINUED | OUTPATIENT
Start: 2021-05-15 | End: 2021-05-15

## 2021-05-15 RX ORDER — ECHINACEA PURPUREA EXTRACT 125 MG
2 TABLET ORAL
Status: DISCONTINUED | OUTPATIENT
Start: 2021-05-15 | End: 2021-05-20

## 2021-05-15 RX ORDER — HEPARIN SODIUM 5000 [USP'U]/ML
5000 INJECTION, SOLUTION INTRAVENOUS; SUBCUTANEOUS EVERY 8 HOURS SCHEDULED
Status: DISCONTINUED | OUTPATIENT
Start: 2021-05-16 | End: 2021-05-16

## 2021-05-15 RX ORDER — LIDOCAINE HYDROCHLORIDE 20 MG/ML
JELLY TOPICAL
Status: DISPENSED
Start: 2021-05-15 | End: 2021-05-15

## 2021-05-15 RX ORDER — FENTANYL CITRATE 50 UG/ML
100 INJECTION, SOLUTION INTRAMUSCULAR; INTRAVENOUS ONCE
Status: COMPLETED | OUTPATIENT
Start: 2021-05-15 | End: 2021-05-15

## 2021-05-15 RX ORDER — FERROUS SULFATE 325(65) MG
325 TABLET ORAL
Status: DISCONTINUED | OUTPATIENT
Start: 2021-05-16 | End: 2021-05-20 | Stop reason: HOSPADM

## 2021-05-15 RX ORDER — EPINEPHRINE 1 MG/ML
INJECTION, SOLUTION, CONCENTRATE INTRAVENOUS
Status: DISCONTINUED
Start: 2021-05-15 | End: 2021-05-15 | Stop reason: WASHOUT

## 2021-05-15 RX ORDER — LIDOCAINE HYDROCHLORIDE 10 MG/ML
INJECTION, SOLUTION EPIDURAL; INFILTRATION; INTRACAUDAL; PERINEURAL
Status: DISPENSED
Start: 2021-05-15 | End: 2021-05-15

## 2021-05-15 RX ORDER — MIDAZOLAM HYDROCHLORIDE 2 MG/2ML
2 INJECTION, SOLUTION INTRAMUSCULAR; INTRAVENOUS ONCE
Status: COMPLETED | OUTPATIENT
Start: 2021-05-15 | End: 2021-05-15

## 2021-05-15 RX ORDER — LIDOCAINE HYDROCHLORIDE 20 MG/ML
INJECTION, SOLUTION EPIDURAL; INFILTRATION; INTRACAUDAL; PERINEURAL
Status: DISPENSED
Start: 2021-05-15 | End: 2021-05-15

## 2021-05-15 RX ORDER — METHYLPREDNISOLONE SODIUM SUCCINATE 125 MG/2ML
60 INJECTION, POWDER, LYOPHILIZED, FOR SOLUTION INTRAMUSCULAR; INTRAVENOUS EVERY 12 HOURS SCHEDULED
Status: DISCONTINUED | OUTPATIENT
Start: 2021-05-16 | End: 2021-05-18

## 2021-05-15 RX ADMIN — FENTANYL CITRATE 100 MCG: 50 INJECTION, SOLUTION INTRAMUSCULAR; INTRAVENOUS at 12:24

## 2021-05-15 RX ADMIN — SULFAMETHOXAZOLE AND TRIMETHOPRIM 2 TABLET: 800; 160 TABLET ORAL at 21:17

## 2021-05-15 RX ADMIN — ASPIRIN 81 MG CHEWABLE TABLET 81 MG: 81 TABLET CHEWABLE at 09:15

## 2021-05-15 RX ADMIN — SULFAMETHOXAZOLE AND TRIMETHOPRIM 2 TABLET: 800; 160 TABLET ORAL at 14:41

## 2021-05-15 RX ADMIN — INSULIN LISPRO 3 UNITS: 100 INJECTION, SOLUTION INTRAVENOUS; SUBCUTANEOUS at 17:08

## 2021-05-15 RX ADMIN — METHYLPREDNISOLONE SODIUM SUCCINATE 60 MG: 125 INJECTION, POWDER, FOR SOLUTION INTRAMUSCULAR; INTRAVENOUS at 17:49

## 2021-05-15 RX ADMIN — Medication 250 MG: at 09:20

## 2021-05-15 RX ADMIN — SULFAMETHOXAZOLE AND TRIMETHOPRIM 2 TABLET: 800; 160 TABLET ORAL at 05:34

## 2021-05-15 RX ADMIN — INSULIN LISPRO 6 UNITS: 100 INJECTION, SOLUTION INTRAVENOUS; SUBCUTANEOUS at 21:27

## 2021-05-15 RX ADMIN — TOPICAL ANESTHETIC 2 SPRAY: 200 SPRAY DENTAL; PERIODONTAL at 11:47

## 2021-05-15 RX ADMIN — FUROSEMIDE 40 MG: 10 INJECTION, SOLUTION INTRAVENOUS at 09:19

## 2021-05-15 RX ADMIN — MIDAZOLAM 2 MG: 1 INJECTION INTRAMUSCULAR; INTRAVENOUS at 12:24

## 2021-05-15 RX ADMIN — METHYLPREDNISOLONE SODIUM SUCCINATE 60 MG: 125 INJECTION, POWDER, FOR SOLUTION INTRAMUSCULAR; INTRAVENOUS at 05:34

## 2021-05-15 RX ADMIN — ENOXAPARIN SODIUM 70 MG: 80 INJECTION SUBCUTANEOUS at 09:20

## 2021-05-15 RX ADMIN — Medication 250 MG: at 17:09

## 2021-05-15 RX ADMIN — MULTIPLE VITAMINS W/ MINERALS TAB 1 TABLET: TAB ORAL at 09:15

## 2021-05-15 RX ADMIN — ATORVASTATIN CALCIUM 40 MG: 40 TABLET, FILM COATED ORAL at 17:07

## 2021-05-15 RX ADMIN — INSULIN LISPRO 1 UNITS: 100 INJECTION, SOLUTION INTRAVENOUS; SUBCUTANEOUS at 09:21

## 2021-05-15 RX ADMIN — PANTOPRAZOLE SODIUM 40 MG: 40 TABLET, DELAYED RELEASE ORAL at 05:34

## 2021-05-15 RX ADMIN — METHYLPREDNISOLONE SODIUM SUCCINATE 60 MG: 125 INJECTION, POWDER, FOR SOLUTION INTRAMUSCULAR; INTRAVENOUS at 12:36

## 2021-05-15 RX ADMIN — Medication 2000 UNITS: at 09:14

## 2021-05-15 RX ADMIN — METHYLPREDNISOLONE SODIUM SUCCINATE 125 MG: 125 INJECTION, POWDER, FOR SOLUTION INTRAMUSCULAR; INTRAVENOUS at 00:10

## 2021-05-15 RX ADMIN — MELATONIN TAB 3 MG 6 MG: 3 TAB at 21:17

## 2021-05-15 RX ADMIN — INSULIN LISPRO 2 UNITS: 100 INJECTION, SOLUTION INTRAVENOUS; SUBCUTANEOUS at 12:05

## 2021-05-15 RX ADMIN — FUROSEMIDE 40 MG: 10 INJECTION, SOLUTION INTRAVENOUS at 17:49

## 2021-05-15 NOTE — ASSESSMENT & PLAN NOTE
· Likely reactive due to steroid use   · Noted 1 band on CBC with diff 5/14  · Trend CBC & temp   · ID following

## 2021-05-15 NOTE — ASSESSMENT & PLAN NOTE
· Baseline 10  · No s/s acute bleeding  · Check full w/u occult  · Cont home iron  · Current Hgb 8 0  · Transfuse for Hgb < 7 0

## 2021-05-15 NOTE — ASSESSMENT & PLAN NOTE
Lab Results   Component Value Date    HGBA1C 7 4 (H) 05/13/2021       Recent Labs     05/14/21  1447 05/14/21  1633 05/14/21  1826 05/14/21 1956   POCGLU 205* 132 145* 105       Blood Sugar Average: Last 72 hrs:  (P) 225 8     · Holding home metformin  · On insulin gtt decreasing IV steroids today, glucose has been within acceptable range on minimal algorithm  · Will switch to SSI   · Goal glucose < 180

## 2021-05-15 NOTE — RESPIRATORY THERAPY NOTE
bedscide Bronch done/2 sprays hurricaine spray in upper airway/lidocaine nebulizer given prior to procedure/10ml 1% lido given via scope/pt daniel   Procedure well/samples sent to lab

## 2021-05-15 NOTE — ASSESSMENT & PLAN NOTE
Wt Readings from Last 3 Encounters:   05/14/21 68 9 kg (151 lb 14 4 oz)   04/20/21 73 2 kg (161 lb 6 oz)   04/10/21 76 8 kg (169 lb 5 oz)       · Echocardiogram shows EF 40% diffuse hypokinesis bio AVR from EF 60% in 2019  · BNP 11000--down to 6500  · Decreased Lasix 40mg IV BID given contraction alkalosis no evidence of edema/JVD  · Fio2 requirements decreasing   · CTA b/l Pleural effusions greater on right  · Goal negative 1 L   · I/o  · Daily weight

## 2021-05-15 NOTE — PROGRESS NOTES
[place]  Progress Note - Adama Mejia 1945, 77 y o  male MRN: [id number]  Unit/Bed#: -01 Encounter: [id number]  Primary Care Provider: Nalini Guaman DO   Date and time admitted to hospital: 5/2/2021  2:28 PM    Pneumonia due to COVID-19 virus  Assessment & Plan  · Covid + on 4/6  · Has had two admissions since, 4/6 to 4/10 and 4/15 to 4/20  CXR GGO  CTA neg PE worsening consolidation/b/l pleural effusions  severe COVID-19 tx algorithm/Positive 4/6  IS  Self prone  MEDICATIONS  Solumedrol 125mg q 6hrs D3/3, transitioned to solumedrol 60 mg BID today  Levaquin completed 5/8  Bactrim D4 given concern for PCP pna   Atorvastatin home dose   Enoxaparin therapeutic  Completed Remdesivir 6 doses   Plasma 5/7  Actemra na  INFLAMMATORY MARKERS (trend q 48-72)  CRP 15--from [id number]  Ferritin 331--peak 529  D-Dimer 3 2---peak7 9  Troponin 0 05  BNP 6500 from [id number]  CK 24  PCT 0 13    * Acute respiratory failure with hypoxia (HCC)  Assessment & Plan  · Secondary to COVID-19 versus PCP PNA vs fibrosis  · Admit 5/2 transferred on 05/12 for high-flow nasal cannula  · CTA -PE, worsening bilateral infiltrates and pleural effusions  · Recently had PET scan completed an outpatient which shows no lymph nodes eliminated but reactive inflammatory response versus tumors  · ID consulted and appreciate recs  · Solumedrol 125mg q 6hrs completed D3/3 on 5/14  · Start solumedrol 60 mg q12 hr today   · Bactrim D4  · 5/8 levaquin completed  · Lasix BID   · Currently on NC 5-6 L, decrease FiO2 to keep O2 sats 88% or greater    Leukocytosis  Assessment & Plan  · Likely reactive due to steroid use   · Noted 1 band on CBC with diff 5/14  · Trend CBC & temp   · ID following     Essential hypertension  Assessment & Plan  · Continue Toprol/lisinopril    Type 2 diabetes mellitus without complication, without long-term current use of insulin [facility])  Assessment & Plan  Lab Results   Component Value Date HGBA1C 7 4 (H) 05/13/2021       Recent Labs     05/14/21  1447 05/14/21  1633 05/14/21  1826 05/14/21  1956   POCGLU 205* 132 145* 105       Blood Sugar Average: Last 72 hrs:  (P) 225 8     · Holding home metformin  · On insulin gtt decreasing IV steroids today, glucose has been within acceptable range on minimal algorithm  · Will switch to SSI   · Goal glucose < 180      Anemia  Assessment & Plan  · Baseline 10  · No s/s acute bleeding  · Check full w/u occult  · Cont home iron  · Current Hgb 8 0  · Transfuse for Hgb < 7 0      Gastroesophageal reflux disease with esophagitis  Assessment & Plan  · Home PPI     Paroxysmal atrial fibrillation ([facility] [address] )  Assessment & Plan  · Patient is on Eliquis as outpatient  · Rate controlled on Lopressor  · Changed to lovenox inpt    Chronic diastolic congestive heart failure (HCC)  Assessment & Plan  Wt Readings from Last 3 Encounters:   05/14/21 68 9 kg (151 lb 14 4 oz)   04/20/21 73 2 kg (161 lb 6 oz)   04/10/21 76 8 kg (169 lb 5 oz)       · Echocardiogram shows EF 40% diffuse hypokinesis bio AVR from EF 60% in 2019  · BNP 11000--down to 6500  · Decreased Lasix 40mg IV BID given contraction alkalosis no evidence of edema/JVD  · Fio2 requirements decreasing   · CTA b/l Pleural effusions greater on right  · Goal negative 1 L   · I/o  · Daily weight      Lymphoma ([facility] [address] )  Assessment & Plan  · Follows with Dr Tanna Boyce at [facility] [phone]  · Patient is considering lung biopsy, however he is currently not stable enough to undergo biopsy  · PET scan done at [facility] concerning for limiting areas in question of possibility to of reactive inflammatory response versus tumors  · ----PET SCAN 4/31 POST COVID DIAGNOSIS ??? SIGNIFICANCE  · Patient will need outpatient follow-up with oncologist  · Received IVIG on 05/06  · Last chemo 2015  · Consider bronch if decreased to midflow/NC to r/o lymph spread of cancer vs fibrosis    Coronary disease  Assessment & Plan  · Status post CABG  · Continue aspirin, statin, beta-blocker, aCEI    ----------------------------------------------------------------------------------------  HPI/24hr events: Patient completed pulse dose steroids   Fio2 requirements decreased to NC o2  He remained on 5-6 L NC o2 overnight  Disposition: Transfer to Med-Surg   Code Status: Level 1 - Full Code  ---------------------------------------------------------------------------------------  SUBJECTIVE  "I'm all right"    Review of Systems   Constitutional: Negative  HENT: Negative  Eyes: Negative  Respiratory: Negative  Cardiovascular: Negative  Gastrointestinal: Negative  Endocrine: Negative  Genitourinary: Negative  Musculoskeletal: Negative  Skin: Negative  Allergic/Immunologic: Negative  Neurological: Negative  Hematological: Negative  Psychiatric/Behavioral: Negative       ---------------------------------------------------------------------------------------  OBJECTIVE    Vitals   Vitals:    21 1955 21 21021 2300 21 2325   BP: 106/50  (!) 89/48 112/53   BP Location: Left arm  Right arm    Pulse: 57  56 58   Resp: 15   18   Temp: (!) 96 8 °F (36 °C)  97 6 °F (36 4 °C)    TempSrc: Oral  Oral    SpO2: 96% 94% 93% 93%   Weight:       Height:         Temp (24hrs), Av 4 °F (36 3 °C), Min:96 8 °F (36 °C), Max:97 6 °F (36 4 °C)  Current: Temperature: 97 6 °F (36 4 °C)          Respiratory:  SpO2: SpO2: 93 %  Nasal Cannula O2 Flow Rate (L/min): 6 L/min    Invasive/non-invasive ventilation settings   Respiratory    Lab Data (Last 4 hours)    None         O2/Vent Data (Last 4 hours)    None                Physical Exam  Constitutional:       General: He is not in acute distress  Appearance: Normal appearance  He is not toxic-appearing  HENT:      Head: Normocephalic and atraumatic  Right Ear: External ear normal       Left Ear: External ear normal       Nose: Nose normal  No congestion  Mouth/Throat:      Mouth: Mucous membranes are moist       Pharynx: Oropharynx is clear  Eyes:      Extraocular Movements: Extraocular movements intact  Conjunctiva/sclera: Conjunctivae normal       Pupils: Pupils are equal, round, and reactive to light  Neck:      Musculoskeletal: Normal range of motion  Cardiovascular:      Rate and Rhythm: Normal rate and regular rhythm  Pulses: Normal pulses  Heart sounds: Normal heart sounds  Pulmonary:      Effort: Pulmonary effort is normal       Breath sounds: Examination of the right-lower field reveals decreased breath sounds  Examination of the left-lower field reveals decreased breath sounds  Decreased breath sounds present  Abdominal:      General: Abdomen is flat  Bowel sounds are normal  There is no distension  Palpations: Abdomen is soft  Musculoskeletal: Normal range of motion  Lymphadenopathy:      Cervical: No cervical adenopathy  Skin:     General: Skin is warm and dry  Capillary Refill: Capillary refill takes less than 2 seconds  Neurological:      General: No focal deficit present  Mental Status: He is alert and oriented to person, place, and time  Psychiatric:         Mood and Affect: Mood normal          Behavior: Behavior normal          Thought Content:  Thought content normal          Laboratory and Diagnostics:  Results from last 7 days   Lab Units 05/14/21  0408 05/13/21  0503 05/12/21  0329 05/11/21  0119 05/10/21  0531 05/09/21  0332 05/08/21  1249   WBC Thousand/uL 25 06* 16 61* 17 80* 14 73* 13 47* 12 13* 10 21*   HEMOGLOBIN g/dL 8 0* 7 8* 8 2* 8 1* 8 2* 7 5* 8 3*   HEMATOCRIT % 26 5* 26 5* 27 4* 27 3* 27 6* 24 6* 27 4*   PLATELETS Thousands/uL 389 280 327 191 332 260 257   NEUTROS PCT %  --  89* 88* 81* 87* 89* 90*   BANDS PCT % 1  --   --   --   --   --   --    MONOS PCT %  --  3* 4 7 6 5 5   MONO PCT % 6  --   --   --   --   --   --      Results from last 7 days   Lab Units 05/14/21  0408 05/13/21  0503 05/12/21  0329 05/11/21  4011 05/10/21  0531 05/09/21  0334 05/08/21  1249   SODIUM mmol/L 139 142 142 142 141 137 137   POTASSIUM mmol/L 4 2 3 7 4 2 3 6 3 6 3 6 3 6   CHLORIDE mmol/L 98* 98* 101 101 100 97* 96*   CO2 mmol/L 36* 38* 31 37* 34* 34* 33*   ANION GAP mmol/L 5 6 10 4 7 6 8   BUN mg/dL 36* 29* 27* 30* 31* 32* 26*   CREATININE mg/dL 1 07 0 95 0 96 1 07 1 11 1 12 1 05   CALCIUM mg/dL 7 9* 7 6* 7 9* 7 9* 8 0* 8 0* 8 2*   GLUCOSE RANDOM mg/dL 142* 246* 220* 164* 168* 248* 272*   ALT U/L  --  31 33 38 42 42 33   AST U/L  --  18 18 23 28 44 31   ALK PHOS U/L  --  139* 142* 120* 124* 127* 104   ALBUMIN g/dL  --  2 5* 2 4* 2 0* 2 1* 1 6* 1 8*   TOTAL BILIRUBIN mg/dL  --  0 80 0 90 0 80 0 70 0 60 0 80     Results from last 7 days   Lab Units 05/13/21  0503   MAGNESIUM mg/dL 2 3                   ABG:    VBG:    Results from last 7 days   Lab Units 05/13/21  0503 05/12/21  1139   PROCALCITONIN ng/ml 0 16 0 13       Micro        EKG: SR   Imaging: I have personally reviewed pertinent reports  Intake and Output  I/O       05/13 0701 - 05/14 0700 05/14 0701 - 05/15 0700    P  O  1830 1320    I V  (mL/kg) 283 6 (4 1) 45 1 (0 7)    IV Piggyback      Total Intake(mL/kg) 2113 6 (30 7) 1365 1 (19 8)    Urine (mL/kg/hr) 2100 (1 3) 900 (0 9)    Total Output 2100 900    Net +13 6 +465 1                Height and Weights   Height: 5' 11" (180 3 cm)  IBW (Ideal Body Weight): 75 3 kg  Body mass index is 21 19 kg/m²    Weight (last 2 days)     Date/Time   Weight    05/14/21 0551   68 9 (151 9)    05/13/21 0540   67 4 (148 59)                Nutrition       Diet Orders   (From admission, onward)             Start     Ordered    05/13/21 0047  Diet Shaw/CHO Controlled; Consistent Carbohydrate Diet Level 2 (5 carb servings/75 grams CHO/meal)  Diet effective now     Question Answer Comment   Diet Type Shaw/CHO Controlled    Shaw/CHO Controlled Consistent Carbohydrate Diet Level 2 (5 carb servings/75 grams CHO/meal) RD to adjust diet per protocol?  Yes        05/13/21 0046                  Active Medications  Scheduled Meds:  Current Facility-Administered Medications   Medication Dose Route Frequency Provider Last Rate    acetaminophen  650 mg Oral Q6H PRN DUNG Carl      aspirin  81 mg Oral Daily DUNG Carl      atorvastatin  40 mg Oral Daily With [address] [facility] DrDUNG      cholecalciferol  2,000 Units Oral Daily Tadeo Hughes, DUNG      enoxaparin  1 mg/kg Subcutaneous Q12H [address] Ran, DUNG      ferrous sulfate  325 mg Oral Daily With Breakfast DUNG Carl      furosemide  40 mg Intravenous BID (diuretic) Alexei BasDUNG bullard      insulin lispro  3 Units Subcutaneous TID With Meals AlexeiDUNG Hickman      insulin regular (HumuLIN R,NovoLIN R) infusion  0 3-21 Units/hr Intravenous DUNG Black Stopped (05/15/21 0412)    lisinopril  20 mg Oral Daily Tadeo Hughes, DUNG      melatonin  6 mg Oral HS DUNG Carl      methylPREDNISolone sodium succinate  60 mg Intravenous Q6H [address] DUNG Morgan      metoprolol succinate  100 mg Oral Daily Tadeo CobDUNG alejandre      multivitamin-minerals  1 tablet Oral Daily DUNG Carl      nitroglycerin  0 4 mg Sublingual Q5 Min PRN Tadeo CobiaDUNG      ondansetron  4 mg Intravenous Q6H PRN Tadeo Cobia, DUNG      pantoprazole  40 mg Oral Early Morning DUNG Carl      polyethylene glycol  17 g Oral Daily PRN Alexei BashDUNG      saccharomyces boulardii  250 mg Oral BID Tadeo Cobia, DUNG      simethicone  80 mg Oral Q6H PRN Tadeo Bacaia, DUNG      sulfamethoxazole-trimethoprim  2 tablet Oral [facility] Dariusz Silveira MD       Continuous Infusions:  insulin regular (HumuLIN R,NovoLIN R) infusion, 0 3-21 Units/hr, Last Rate: Stopped (05/15/21 0412)      PRN Meds:   acetaminophen, 650 mg, Q6H PRN  nitroglycerin, 0 4 mg, Q5 Min PRN  ondansetron, 4 mg, Q6H PRN  polyethylene glycol, 17 g, Daily PRN  simethicone, 80 mg, Q6H PRN        Invasive Devices Review  Invasive Devices     Peripheral Intravenous Line            Peripheral IV 05/13/21 Right;Ventral (anterior) Forearm 1 day                Rationale for remaining devices: n/a  ---------------------------------------------------------------------------------------  Advance Directive and Living Will:      Power of :    POLST:    ---------------------------------------------------------------------------------------  Care Time Delivered:   No Critical Care time spent       [facility], DUNG      Portions of the record may have been created with voice recognition software  Occasional wrong word or "sound a like" substitutions may have occurred due to the inherent limitations of voice recognition software    Read the chart carefully and recognize, using context, where substitutions have occurred

## 2021-05-16 LAB
ABO GROUP BLD BPU: NORMAL
ANION GAP SERPL CALCULATED.3IONS-SCNC: 2 MMOL/L (ref 4–13)
APTT PPP: 27 SECONDS (ref 23–37)
BLD SMEAR INTERP: NORMAL
BPU ID: NORMAL
BUN SERPL-MCNC: 49 MG/DL (ref 5–25)
CALCIUM SERPL-MCNC: 7.3 MG/DL (ref 8.3–10.1)
CHLORIDE SERPL-SCNC: 99 MMOL/L (ref 100–108)
CO2 SERPL-SCNC: 36 MMOL/L (ref 21–32)
CREAT SERPL-MCNC: 1.26 MG/DL (ref 0.6–1.3)
CROSSMATCH: NORMAL
D DIMER PPP FEU-MCNC: 2.39 UG/ML FEU
DEPRECATED AT III PPP: 146 % OF NORMAL (ref 92–136)
ERYTHROCYTE [DISTWIDTH] IN BLOOD BY AUTOMATED COUNT: 20.6 % (ref 11.6–15.1)
FDP BLD QL AGGL: >10 <20
FIBRINOGEN PPP-MCNC: 211 MG/DL (ref 227–495)
GFR SERPL CREATININE-BSD FRML MDRD: 56 ML/MIN/1.73SQ M
GLUCOSE SERPL-MCNC: 156 MG/DL (ref 65–140)
GLUCOSE SERPL-MCNC: 202 MG/DL (ref 65–140)
GLUCOSE SERPL-MCNC: 206 MG/DL (ref 65–140)
GLUCOSE SERPL-MCNC: 227 MG/DL (ref 65–140)
GLUCOSE SERPL-MCNC: 249 MG/DL (ref 65–140)
GLUCOSE SERPL-MCNC: 324 MG/DL (ref 65–140)
HCT VFR BLD AUTO: 23.6 % (ref 36.5–49.3)
HGB BLD-MCNC: 7.4 G/DL (ref 12–17)
INR PPP: 1.12 (ref 0.84–1.19)
MCH RBC QN AUTO: 25.4 PG (ref 26.8–34.3)
MCHC RBC AUTO-ENTMCNC: 31.4 G/DL (ref 31.4–37.4)
MCV RBC AUTO: 81 FL (ref 82–98)
PLATELET # BLD AUTO: 241 THOUSANDS/UL (ref 149–390)
PLATELET # BLD AUTO: 289 THOUSANDS/UL (ref 149–390)
PMV BLD AUTO: 10.2 FL (ref 8.9–12.7)
PMV BLD AUTO: 9.9 FL (ref 8.9–12.7)
POTASSIUM SERPL-SCNC: 4.5 MMOL/L (ref 3.5–5.3)
PROCALCITONIN SERPL-MCNC: 0.07 NG/ML
PROTHROMBIN TIME: 14.4 SECONDS (ref 11.6–14.5)
RBC # BLD AUTO: 2.91 MILLION/UL (ref 3.88–5.62)
SODIUM SERPL-SCNC: 137 MMOL/L (ref 136–145)
TPA PPP QL CHRO: 82 % OF NORMAL (ref 77–138)
UNIT DISPENSE STATUS: NORMAL
UNIT PRODUCT CODE: NORMAL
UNIT RH: NORMAL
WBC # BLD AUTO: 18.36 THOUSAND/UL (ref 4.31–10.16)

## 2021-05-16 PROCEDURE — 94760 N-INVAS EAR/PLS OXIMETRY 1: CPT

## 2021-05-16 PROCEDURE — 85027 COMPLETE CBC AUTOMATED: CPT | Performed by: NURSE PRACTITIONER

## 2021-05-16 PROCEDURE — 85049 AUTOMATED PLATELET COUNT: CPT | Performed by: NURSE PRACTITIONER

## 2021-05-16 PROCEDURE — 85362 FIBRIN DEGRADATION PRODUCTS: CPT | Performed by: NURSE PRACTITIONER

## 2021-05-16 PROCEDURE — 99232 SBSQ HOSP IP/OBS MODERATE 35: CPT | Performed by: INTERNAL MEDICINE

## 2021-05-16 PROCEDURE — 85420 FIBRINOLYTIC PLASMINOGEN: CPT | Performed by: NURSE PRACTITIONER

## 2021-05-16 PROCEDURE — 82948 REAGENT STRIP/BLOOD GLUCOSE: CPT

## 2021-05-16 PROCEDURE — 85730 THROMBOPLASTIN TIME PARTIAL: CPT | Performed by: NURSE PRACTITIONER

## 2021-05-16 PROCEDURE — 85300 ANTITHROMBIN III ACTIVITY: CPT | Performed by: NURSE PRACTITIONER

## 2021-05-16 PROCEDURE — 85610 PROTHROMBIN TIME: CPT | Performed by: NURSE PRACTITIONER

## 2021-05-16 PROCEDURE — 80048 BASIC METABOLIC PNL TOTAL CA: CPT | Performed by: NURSE PRACTITIONER

## 2021-05-16 PROCEDURE — 85379 FIBRIN DEGRADATION QUANT: CPT | Performed by: NURSE PRACTITIONER

## 2021-05-16 PROCEDURE — 85384 FIBRINOGEN ACTIVITY: CPT | Performed by: NURSE PRACTITIONER

## 2021-05-16 PROCEDURE — 84145 PROCALCITONIN (PCT): CPT | Performed by: INTERNAL MEDICINE

## 2021-05-16 RX ORDER — SULFAMETHOXAZOLE AND TRIMETHOPRIM 800; 160 MG/1; MG/1
2 TABLET ORAL EVERY 12 HOURS SCHEDULED
Status: DISCONTINUED | OUTPATIENT
Start: 2021-05-16 | End: 2021-05-20 | Stop reason: HOSPADM

## 2021-05-16 RX ADMIN — ATORVASTATIN CALCIUM 40 MG: 40 TABLET, FILM COATED ORAL at 17:09

## 2021-05-16 RX ADMIN — FUROSEMIDE 40 MG: 10 INJECTION, SOLUTION INTRAVENOUS at 17:09

## 2021-05-16 RX ADMIN — INSULIN LISPRO 4 UNITS: 100 INJECTION, SOLUTION INTRAVENOUS; SUBCUTANEOUS at 11:06

## 2021-05-16 RX ADMIN — METHYLPREDNISOLONE SODIUM SUCCINATE 60 MG: 125 INJECTION, POWDER, FOR SOLUTION INTRAMUSCULAR; INTRAVENOUS at 08:01

## 2021-05-16 RX ADMIN — FUROSEMIDE 40 MG: 10 INJECTION, SOLUTION INTRAVENOUS at 08:01

## 2021-05-16 RX ADMIN — METHYLPREDNISOLONE SODIUM SUCCINATE 60 MG: 125 INJECTION, POWDER, FOR SOLUTION INTRAMUSCULAR; INTRAVENOUS at 21:13

## 2021-05-16 RX ADMIN — METOPROLOL SUCCINATE 100 MG: 50 TABLET, EXTENDED RELEASE ORAL at 08:01

## 2021-05-16 RX ADMIN — INSULIN LISPRO 4 UNITS: 100 INJECTION, SOLUTION INTRAVENOUS; SUBCUTANEOUS at 17:06

## 2021-05-16 RX ADMIN — ASPIRIN 81 MG CHEWABLE TABLET 81 MG: 81 TABLET CHEWABLE at 08:02

## 2021-05-16 RX ADMIN — Medication 250 MG: at 17:08

## 2021-05-16 RX ADMIN — INSULIN LISPRO 4 UNITS: 100 INJECTION, SOLUTION INTRAVENOUS; SUBCUTANEOUS at 07:11

## 2021-05-16 RX ADMIN — SULFAMETHOXAZOLE AND TRIMETHOPRIM 2 TABLET: 800; 160 TABLET ORAL at 05:06

## 2021-05-16 RX ADMIN — LISINOPRIL 20 MG: 20 TABLET ORAL at 08:01

## 2021-05-16 RX ADMIN — FERROUS SULFATE TAB 325 MG (65 MG ELEMENTAL FE) 325 MG: 325 (65 FE) TAB at 08:02

## 2021-05-16 RX ADMIN — MULTIPLE VITAMINS W/ MINERALS TAB 1 TABLET: TAB ORAL at 08:02

## 2021-05-16 RX ADMIN — SULFAMETHOXAZOLE AND TRIMETHOPRIM 2 TABLET: 800; 160 TABLET ORAL at 21:13

## 2021-05-16 RX ADMIN — MELATONIN TAB 3 MG 6 MG: 3 TAB at 21:13

## 2021-05-16 RX ADMIN — PANTOPRAZOLE SODIUM 40 MG: 40 TABLET, DELAYED RELEASE ORAL at 05:06

## 2021-05-16 RX ADMIN — Medication 2000 UNITS: at 08:01

## 2021-05-16 RX ADMIN — INSULIN LISPRO 1 UNITS: 100 INJECTION, SOLUTION INTRAVENOUS; SUBCUTANEOUS at 21:13

## 2021-05-16 RX ADMIN — Medication 250 MG: at 08:02

## 2021-05-16 NOTE — ASSESSMENT & PLAN NOTE
· S/p Bronch 5/15 - imaging available in media   · Pending results from samples obtained   · S/p 1 units PRBC on 5/15 due tp Hgb drop

## 2021-05-16 NOTE — ASSESSMENT & PLAN NOTE
· Covid + on 4/6  · Has had two admissions since, 4/6 to 4/10 and 4/15 to 4/20  CXR GGO  CTA neg PE worsening consolidation/b/l pleural effusions  severe COVID-19 tx algorithm/Positive 4/6  IS  Self prone  MEDICATIONS  Solumedrol 125mg q 6hrs D3/3, transitioned to solumedrol 60 mg BID 5/15  Levaquin completed 5/8  Bactrim D4 given concern for PCP pna   Atorvastatin home dose   Enoxaparin therapeutic  Completed Remdesivir 6 doses   Plasma 5/7  Actemra na  INFLAMMATORY MARKERS (trend q 48-72)  CRP 15--from [id number]  Ferritin 331--peak 529  D-Dimer 3 2---peak7 9  Troponin 0 05  BNP 6500 from [id number]  CK 24  PCT 0 13

## 2021-05-16 NOTE — PLAN OF CARE
Problem: RESPIRATORY - ADULT  Goal: Achieves optimal ventilation and oxygenation  Description: INTERVENTIONS:  - Assess for changes in respiratory status  - Assess for changes in mentation and behavior  - Position to facilitate oxygenation and minimize respiratory effort  - Oxygen administered by appropriate delivery if ordered  - Initiate smoking cessation education as indicated  - Encourage broncho-pulmonary hygiene including cough, deep breathe, Incentive Spirometry  - Assess the need for suctioning and aspirate as needed  - Assess and instruct to report SOB or any respiratory difficulty  - Respiratory Therapy support as indicated  Outcome: Progressing     Problem: DISCHARGE PLANNING  Goal: Discharge to home or other facility with appropriate resources  Description: INTERVENTIONS:  - Identify barriers to discharge w/patient and caregiver  - Arrange for needed discharge resources and transportation as appropriate  - Identify discharge learning needs (meds, wound care, etc )  - Arrange for interpretive services to assist at discharge as needed  - Refer to Case Management Department for coordinating discharge planning if the patient needs post-hospital services based on physician/advanced practitioner order or complex needs related to functional status, cognitive ability, or social support system  Outcome: Progressing     Problem: Knowledge Deficit  Goal: Patient/family/caregiver demonstrates understanding of disease process, treatment plan, medications, and discharge instructions  Description: Complete learning assessment and assess knowledge base    Interventions:  - Provide teaching at level of understanding  - Provide teaching via preferred learning methods  Outcome: Progressing     Problem: PAIN - ADULT  Goal: Verbalizes/displays adequate comfort level or baseline comfort level  Description: Interventions:  - Encourage patient to monitor pain and request assistance  - Assess pain using appropriate pain scale  - Administer analgesics based on type and severity of pain and evaluate response  - Implement non-pharmacological measures as appropriate and evaluate response  - Consider cultural and social influences on pain and pain management  - Notify physician/advanced practitioner if interventions unsuccessful or patient reports new pain  Outcome: Progressing     Problem: INFECTION - ADULT  Goal: Absence or prevention of progression during hospitalization  Description: INTERVENTIONS:  - Assess and monitor for signs and symptoms of infection  - Monitor lab/diagnostic results  - Monitor all insertion sites, i e  indwelling lines, tubes, and drains  - Monitor endotracheal if appropriate and nasal secretions for changes in amount and color  - [place] appropriate cooling/warming therapies per order  - Administer medications as ordered  - Instruct and encourage patient and family to use good hand hygiene technique  - Identify and instruct in appropriate isolation precautions for identified infection/condition  Outcome: Progressing     Problem: SAFETY ADULT  Goal: Patient will remain free of falls  Description: INTERVENTIONS:  - Assess patient frequently for physical needs  -  Identify cognitive and physical deficits and behaviors that affect risk of falls    -  [place] fall precautions as indicated by assessment   - Educate patient/family on patient safety including physical limitations  - Instruct patient to call for assistance with activity based on assessment  - Modify environment to reduce risk of injury  - Consider OT/PT consult to assist with strengthening/mobility  Outcome: Progressing  Goal: Maintain or return to baseline ADL function  Description: INTERVENTIONS:  -  Assess patient's ability to carry out ADLs; assess patient's baseline for ADL function and identify physical deficits which impact ability to perform ADLs (bathing, care of mouth/teeth, toileting, grooming, dressing, etc )  - Assess/evaluate cause of self-care deficits   - Assess range of motion  - Assess patient's mobility; develop plan if impaired  - Assess patient's need for assistive devices and provide as appropriate  - Encourage maximum independence but intervene and supervise when necessary  - Involve family in performance of ADLs  - Assess for home care needs following discharge   - Consider OT consult to assist with ADL evaluation and planning for discharge  - Provide patient education as appropriate  Outcome: Progressing  Goal: Maintain or return mobility status to optimal level  Description: INTERVENTIONS:  - Assess patient's baseline mobility status (ambulation, transfers, stairs, etc )    - Identify cognitive and physical deficits and behaviors that affect mobility  - Identify mobility aids required to assist with transfers and/or ambulation (gait belt, sit-to-stand, lift, walker, cane, etc )  - [place] fall precautions as indicated by assessment  - Record patient progress and toleration of activity level on Mobility SBAR; progress patient to next Phase/Stage  - Instruct patient to call for assistance with activity based on assessment  - Consider rehabilitation consult to assist with strengthening/weightbearing, etc   Outcome: Progressing     Problem: Potential for Falls  Goal: Patient will remain free of falls  Description: INTERVENTIONS:  - Assess patient frequently for physical needs  -  Identify cognitive and physical deficits and behaviors that affect risk of falls    -  [place] fall precautions as indicated by assessment   - Educate patient/family on patient safety including physical limitations  - Instruct patient to call for assistance with activity based on assessment  - Modify environment to reduce risk of injury  - Consider OT/PT consult to assist with strengthening/mobility  Outcome: Progressing     Problem: Prexisting or High Potential for Compromised Skin Integrity  Goal: Skin integrity is maintained or improved  Description: INTERVENTIONS:  - Identify patients at risk for skin breakdown  - Assess and monitor skin integrity  - Assess and monitor nutrition and hydration status  - Monitor labs   - Assess for incontinence   - Turn and reposition patient  - Assist with mobility/ambulation  - Relieve pressure over bony prominences  - Avoid friction and shearing  - Provide appropriate hygiene as needed including keeping skin clean and dry  - Evaluate need for skin moisturizer/barrier cream  - Collaborate with interdisciplinary team   - Patient/family teaching  - Consider wound care consult   Outcome: Progressing

## 2021-05-16 NOTE — ASSESSMENT & PLAN NOTE
Wt Readings from Last 3 Encounters:   05/15/21 69 7 kg (153 lb 10 6 oz)   04/20/21 73 2 kg (161 lb 6 oz)   04/10/21 76 8 kg (169 lb 5 oz)       · Echocardiogram shows EF 40% diffuse hypokinesis bio AVR from EF 60% in 2019  · BNP 11000--down to 6500  · Lasix 40mg IV BID, wound consider switching to PO given improvement in oxygenation & bronch results   · Fio2 requirements decreasing   · CTA b/l Pleural effusions greater on right  · Goal negative 500 - 1 L   · I/o  · Daily weight

## 2021-05-16 NOTE — PROGRESS NOTES
[place]  Progress Note - [place] 1945, 77 y o  male MRN: [id number]  Unit/Bed#: -01 Encounter: [id number]  Primary Care Provider: Henry Whelan DO   Date and time admitted to hospital: 5/2/2021  2:28 PM    Pneumonia due to COVID-19 virus  Assessment & Plan  · Covid + on 4/6  · Has had two admissions since, 4/6 to 4/10 and 4/15 to 4/20  CXR GGO  CTA neg PE worsening consolidation/b/l pleural effusions  severe COVID-19 tx algorithm/Positive 4/6  IS  Self prone  MEDICATIONS  Solumedrol 125mg q 6hrs D3/3, transitioned to solumedrol 60 mg BID 5/15  Levaquin completed 5/8  Bactrim D5 given concern for PCP pna   Atorvastatin home dose   Enoxaparin therapeutic - switched to SQ heparin due to Bronch findings  Completed Remdesivir 6 doses   Plasma 5/7  Actemra na  INFLAMMATORY MARKERS (trend q 48-72)  CRP 15--from [id number]  Ferritin 331--peak 529  D-Dimer 3 2---peak7 9  Troponin 0 05  BNP 6500 from [id number]  CK 24  PCT 0 13    * Acute respiratory failure with hypoxia (HCC)  Assessment & Plan  · Secondary to COVID-19 versus PCP PNA vs fibrosis  · Admit 5/2 transferred on 05/12 for high-flow nasal cannula  · CTA -PE, worsening bilateral infiltrates and pleural effusions  · Recently had PET scan completed an outpatient which shows no lymph nodes eliminated but reactive inflammatory response versus tumors  · ID consulted and appreciate recs  · Solumedrol 125mg q 6hrs completed D3/3 on 5/14  · Continue solumedrol 60 mg q12 hr d#2  · Bactrim D5  · 5/8 levaquin completed  · Lasix BID   · Currently on NC 5-6 L, decrease FiO2 to keep O2 sats 88% or greater  · S/p Bronch 5/15 revealing alveolar hemorrhage, pending further study results from obtained samples     Pulmonary alveolar hemorrhage  Assessment & Plan  · S/p Bronch 5/15 - imaging available in media   · Pending results from samples obtained   · S/p 1 units PRBC on 5/15 due tp Hgb drop     Leukocytosis  Assessment & Plan  · Likely reactive due to steroid use   · Noted 1 band on CBC with diff 5/14  · Trend CBC & temp   · ID following     Acute on chronic blood loss anemia  Assessment & Plan  · Baseline 10  · Bronch - alveolar hemorrhage on 5/15   · S/p 1 unit PRBC 5/15  · Hgb improved to 7 5  · Cont home iron  · Transfuse for Hgb < 7 0    · Trend Hgb daily & PRN     Essential hypertension  Assessment & Plan  · Continue Toprol/lisinopril    Type 2 diabetes mellitus without complication, without long-term current use of insulin [facility])  Assessment & Plan  Lab Results   Component Value Date    HGBA1C 7 4 (H) 05/13/2021       Recent Labs     05/15/21  0701 05/15/21  1204 05/15/21  1618 05/15/21  2118   POCGLU 159* 239* 286* 361*       Blood Sugar Average: Last 72 hrs:  (P) 218 [id number]     · Holding home metformin  · Continue SSI, increased to algorithm 4   · 3 units TID with meals    · Goal glucose < 180      Gastroesophageal reflux disease with esophagitis  Assessment & Plan  · Home PPI     Paroxysmal atrial fibrillation (HCC)  Assessment & Plan  · Patient is on Eliquis as outpatient  · Rate controlled on Lopressor  · Changed to lovenox inpt followed by SQ heparin due to bronch findings     Chronic diastolic congestive heart failure (HCC)  Assessment & Plan  Wt Readings from Last 3 Encounters:   05/15/21 69 7 kg (153 lb 10 6 oz)   04/20/21 73 2 kg (161 lb 6 oz)   04/10/21 76 8 kg (169 lb 5 oz)       · Echocardiogram shows EF 40% diffuse hypokinesis bio AVR from EF 60% in 2019  · BNP 11000--down to 6500  · Lasix 40mg IV BID, wound consider switching to PO given improvement in oxygenation & bronch results   · Fio2 requirements decreasing   · CTA b/l Pleural effusions greater on right  · Goal negative 500 - 1 L   · I/o  · Daily weight      Lymphoma ([address] )  Assessment & Plan  · Follows with Dr Mckay Gross at [facility] [phone]  · Patient is considering lung biopsy, however he is currently not stable enough to undergo biopsy  · PET scan done at [facility] concerning for limiting areas in question of possibility to of reactive inflammatory response versus tumors  · ----PET SCAN  POST COVID DIAGNOSIS ??? SIGNIFICANCE  · Patient will need outpatient follow-up with oncologist  · Received IVIG on   · Last chemo   · S/p Bronch 5/15 - noted alveolar hemorrhage - pending serology including ANCA, PCP, flow cytometry and cytology and cell count    Coronary disease  Assessment & Plan  · Status post CABG  · Continue aspirin, statin, beta-blocker, aCEI    ----------------------------------------------------------------------------------------  HPI/24hr events: Remained stable on 5-6 L NC, Bronchoscopy performed at bedside with findings of alveolar hemorrhage, BAL sent for testing, pending results  Overnight, patient required increase to 10 L midflow while sleeping due to o2 87%  Disposition: Transfer to Med-Surg if o2 requirements continue to decrease  Code Status: Level 1 - Full Code  ---------------------------------------------------------------------------------------  SUBJECTIVE  "i'm good"    Review of Systems   Constitutional: Negative  HENT: Negative  Eyes: Negative  Respiratory: Positive for cough  Cardiovascular: Negative  Gastrointestinal: Negative  Endocrine: Negative  Genitourinary: Negative  Musculoskeletal: Negative  Skin: Negative  Allergic/Immunologic: Negative  Neurological: Negative  Hematological: Negative      Psychiatric/Behavioral: Negative       ---------------------------------------------------------------------------------------  OBJECTIVE    Vitals   Vitals:    21 0000 21 0039 21 0131 21 0200   BP: 124/57   101/51   BP Location: Left arm   Left arm   Pulse: 69   68   Resp: 14   14   Temp: 97 6 °F (36 4 °C)   (!) 96 7 °F (35 9 °C)   TempSrc: Oral   Oral   SpO2: 90% 92% 93% 97%   Weight:       Height:         Temp (24hrs), Av 2 °F (36 2 °C), Min:96 7 °F (35 9 °C), Max:97 6 °F (36 4 °C)  Current: Temperature: (!) 96 7 °F (35 9 °C)          Respiratory:  SpO2: SpO2: 97 %  Nasal Cannula O2 Flow Rate (L/min): 10 L/min    Invasive/non-invasive ventilation settings   Respiratory    Lab Data (Last 4 hours)    None         O2/Vent Data (Last 4 hours)    None                Physical Exam  Constitutional:       General: He is not in acute distress  Appearance: Normal appearance  HENT:      Head: Normocephalic and atraumatic  Right Ear: External ear normal       Left Ear: External ear normal       Nose: Nose normal       Mouth/Throat:      Mouth: Mucous membranes are moist       Pharynx: Oropharynx is clear  Eyes:      Extraocular Movements: Extraocular movements intact  Conjunctiva/sclera: Conjunctivae normal       Pupils: Pupils are equal, round, and reactive to light  Neck:      Musculoskeletal: Normal range of motion  Cardiovascular:      Rate and Rhythm: Normal rate and regular rhythm  Pulses: Normal pulses  Heart sounds: Normal heart sounds  Pulmonary:      Effort: Pulmonary effort is normal  No respiratory distress  Breath sounds: Normal breath sounds  No wheezing  Abdominal:      General: Abdomen is flat  Bowel sounds are normal  There is no distension  Palpations: Abdomen is soft  Tenderness: There is no abdominal tenderness  Musculoskeletal: Normal range of motion  Skin:     General: Skin is warm and dry  Capillary Refill: Capillary refill takes less than 2 seconds  Neurological:      General: No focal deficit present  Mental Status: He is alert and oriented to person, place, and time     Psychiatric:         Mood and Affect: Mood normal          Behavior: Behavior normal          Laboratory and Diagnostics:  Results from last 7 days   Lab Units 05/15/21  2022 05/15/21  1211 05/15/21  0403 05/14/21  0408 05/13/21  0503 05/12/21  5843 05/11/21  5882 05/10/21  8415 05/09/21  2298 WBC Thousand/uL  --   --  19 18* 25 06* 16 61* 17 80* 14 73* 13 47* 12 13*   HEMOGLOBIN g/dL 7 5* 6 7* 7 0* 8 0* 7 8* 8 2* 8 1* 8 2* 7 5*   HEMATOCRIT %  --   --  23 9* 26 5* 26 5* 27 4* 27 3* 27 6* 24 6*   PLATELETS Thousands/uL  --   --  287 389 280 327 191 332 260   NEUTROS PCT %  --   --   --   --  89* 88* 81* 87* 89*   BANDS PCT %  --   --   --  1  --   --   --   --   --    MONOS PCT %  --   --   --   --  3* 4 7 6 5   MONO PCT %  --   --   --  6  --   --   --   --   --      Results from last 7 days   Lab Units 05/15/21  0403 05/14/21  0408 05/13/21  0503 05/12/21  0329 05/11/21  0832 05/10/21  0531 05/09/21  0334   SODIUM mmol/L 140 139 142 142 142 141 137   POTASSIUM mmol/L 4 3 4 2 3 7 4 2 3 6 3 6 3 6   CHLORIDE mmol/L 99* 98* 98* 101 101 100 97*   CO2 mmol/L 36* 36* 38* 31 37* 34* 34*   ANION GAP mmol/L 5 5 6 10 4 7 6   BUN mg/dL 43* 36* 29* 27* 30* 31* 32*   CREATININE mg/dL 1 12 1 07 0 95 0 96 1 07 1 11 1 12   CALCIUM mg/dL 7 7* 7 9* 7 6* 7 9* 7 9* 8 0* 8 0*   GLUCOSE RANDOM mg/dL 86 142* 246* 220* 164* 168* 248*   ALT U/L  --   --  31 33 38 42 42   AST U/L  --   --  18 18 23 28 44   ALK PHOS U/L  --   --  139* 142* 120* 124* 127*   ALBUMIN g/dL  --   --  2 5* 2 4* 2 0* 2 1* 1 6*   TOTAL BILIRUBIN mg/dL  --   --  0 80 0 90 0 80 0 70 0 60     Results from last 7 days   Lab Units 05/13/21  0503   MAGNESIUM mg/dL 2 3                   ABG:    VBG:    Results from last 7 days   Lab Units 05/15/21  0403 05/13/21  0503 05/12/21  1139   PROCALCITONIN ng/ml 0 10 0 16 0 13       Micro        EKG: SR   Imaging: I have personally reviewed pertinent reports  Intake and Output  I/O       05/14 0701 - 05/15 0700 05/15 0701 - 05/16 0700    P  O  1560 960    I V  (mL/kg) 59 9 (0 9)     Blood  350    Total Intake(mL/kg) 1619 9 (23 2) 1310 (18 8)    Urine (mL/kg/hr) 1605 (1) 1745 (1 7)    Total Output 1605 1745    Net +14 9 -435                Height and Weights   Height: 5' 11" (180 3 cm)  IBW (Ideal Body Weight): 75 3 kg  Body mass index is 21 43 kg/m²  Weight (last 2 days)     Date/Time   Weight    05/15/21 0528   69 7 (153 66)    05/14/21 0551   68 9 (151 9)                Nutrition       Diet Orders   (From admission, onward)             Start     Ordered    05/13/21 0047  Diet Shaw/CHO Controlled; Consistent Carbohydrate Diet Level 2 (5 carb servings/75 grams CHO/meal)  Diet effective now     Question Answer Comment   Diet Type Shaw/CHO Controlled    Shaw/CHO Controlled Consistent Carbohydrate Diet Level 2 (5 carb servings/75 grams CHO/meal)    RD to adjust diet per protocol?  Yes        05/13/21 0046                  Active Medications  Scheduled Meds:  Current Facility-Administered Medications   Medication Dose Route Frequency Provider Last Rate    acetaminophen  650 mg Oral Q6H PRN Inocencia Jasvir, CRNP      aspirin  81 mg Oral Daily Inocencia Jasvir, CRNP      atorvastatin  40 mg Oral Daily With [address] [facility] DUNG Zhu      cholecalciferol  2,000 Units Oral Daily Inocencia Jasvir, CRJERRY      ferrous sulfate  325 mg Oral Daily With Breakfast DUNG Sood      furosemide  40 mg Intravenous BID (diuretic) DUNG Ramirez      heparin (porcine)  5,000 Units Subcutaneous [facility] DUNG Sood      insulin lispro  1-6 Units Subcutaneous HS DUNG Quintero      insulin lispro  2-12 Units Subcutaneous TID AC DUNG Russell      insulin lispro  3 Units Subcutaneous TID With Meals DUNG Ramirez      lisinopril  20 mg Oral Daily Inocencia Jasvir, CRJERRY      melatonin  6 mg Oral HS Inocencia Jasvir, CRNP      methylPREDNISolone sodium succinate  60 mg Intravenous Q12H [address] DUNG Russell      metoprolol succinate  100 mg Oral Daily Inocencia Jasvir, CRNP      multivitamin-minerals  1 tablet Oral Daily Inocencia Jasvir, CRNP      nitroglycerin  0 4 mg Sublingual Q5 Min PRN Inocencia Jasvir, CRNP      ondansetron  4 mg Intravenous Q6H PRN Inocencia Jasvir, CRNP      pantoprazole  40 mg Oral Early Morning DUNG Mahajan      polyethylene glycol  17 g Oral Daily PRN DUNG Gage      saccharomyces boulardii  250 mg Oral BID DUNG Mahajan      simethicone  80 mg Oral Q6H PRN DUNG Mahajan      sodium chloride  2 spray Each Nare Q1H PRN DUNG Gary      sulfamethoxazole-trimethoprim  2 tablet Oral Q8H [facility] Loreta De Jesus MD       Continuous Infusions:     PRN Meds:   acetaminophen, 650 mg, Q6H PRN  nitroglycerin, 0 4 mg, Q5 Min PRN  ondansetron, 4 mg, Q6H PRN  polyethylene glycol, 17 g, Daily PRN  simethicone, 80 mg, Q6H PRN  sodium chloride, 2 spray, Q1H PRN        Invasive Devices Review  Invasive Devices     Peripheral Intravenous Line            Peripheral IV 05/13/21 Right;Ventral (anterior) Forearm 2 days                Rationale for remaining devices: n/a   ---------------------------------------------------------------------------------------  Advance Directive and Living Will:      Power of :    POLST:    ---------------------------------------------------------------------------------------  Care Time Delivered:   No Critical Care time spent       DUNG Cole      Portions of the record may have been created with voice recognition software  Occasional wrong word or "sound a like" substitutions may have occurred due to the inherent limitations of voice recognition software    Read the chart carefully and recognize, using context, where substitutions have occurred

## 2021-05-16 NOTE — ASSESSMENT & PLAN NOTE
· Baseline 10  · Bronch - alveolar hemorrhage on 5/15   · S/p 1 unit PRBC 5/15  · Hgb improved to 7 5  · Cont home iron  · Transfuse for Hgb < 7 0    · Trend Hgb daily & PRN

## 2021-05-16 NOTE — ASSESSMENT & PLAN NOTE
· Secondary to COVID-19 versus PCP PNA vs fibrosis  · Admit 5/2 transferred on 05/12 for high-flow nasal cannula  · CTA -PE, worsening bilateral infiltrates and pleural effusions  · Recently had PET scan completed an outpatient which shows no lymph nodes eliminated but reactive inflammatory response versus tumors  · ID consulted and appreciate recs  · Solumedrol 125mg q 6hrs completed D3/3 on 5/14  · Continue solumedrol 60 mg q12 hr d#2  · Bactrim D5  · 5/8 levaquin completed  · Lasix BID   · Currently on NC 5-6 L, decrease FiO2 to keep O2 sats 88% or greater  · S/p Bronch 5/15 revealing alveolar hemorrhage, pending further study results from obtained samples

## 2021-05-16 NOTE — ASSESSMENT & PLAN NOTE
Lab Results   Component Value Date    HGBA1C 7 4 (H) 05/13/2021       Recent Labs     05/15/21  0701 05/15/21  1204 05/15/21  1618 05/15/21  2118   POCGLU 159* 239* 286* 361*       Blood Sugar Average: Last 72 hrs:  (P) 218 [id number]     · Holding home metformin  · Continue SSI, increased to algorithm 4   · 3 units TID with meals    · Goal glucose < 180

## 2021-05-16 NOTE — ASSESSMENT & PLAN NOTE
· Follows with Dr Heydi Membreno at [facility] [phone]  · Patient is considering lung biopsy, however he is currently not stable enough to undergo biopsy  · PET scan done at [facility] concerning for limiting areas in question of possibility to of reactive inflammatory response versus tumors  · ----PET SCAN 4/31 POST COVID DIAGNOSIS ??? SIGNIFICANCE  · Patient will need outpatient follow-up with oncologist  · Received IVIG on 05/06  · Last chemo 2015  · S/p Bronch 5/15 - noted alveolar hemorrhage - pending serology including ANCA, PCP, flow cytometry and cytology and cell count

## 2021-05-17 ENCOUNTER — TRANSCRIBE ORDERS (OUTPATIENT)
Dept: LAB | Facility: HOSPITAL | Age: 76
End: 2021-05-17

## 2021-05-17 DIAGNOSIS — R04.89 PULMONARY ALVEOLAR HEMORRHAGE: Primary | ICD-10-CM

## 2021-05-17 DIAGNOSIS — U07.1 PNEUMONIA DUE TO COVID-19 VIRUS: ICD-10-CM

## 2021-05-17 DIAGNOSIS — J12.82 PNEUMONIA DUE TO COVID-19 VIRUS: ICD-10-CM

## 2021-05-17 DIAGNOSIS — J96.01 ACUTE RESPIRATORY FAILURE WITH HYPOXIA (HCC): ICD-10-CM

## 2021-05-17 LAB
ANION GAP SERPL CALCULATED.3IONS-SCNC: 4 MMOL/L (ref 4–13)
ANION GAP SERPL CALCULATED.3IONS-SCNC: 7 MMOL/L (ref 4–13)
BUN SERPL-MCNC: 43 MG/DL (ref 5–25)
BUN SERPL-MCNC: 46 MG/DL (ref 5–25)
CALCIUM SERPL-MCNC: 7.8 MG/DL (ref 8.3–10.1)
CALCIUM SERPL-MCNC: 8.2 MG/DL (ref 8.3–10.1)
CHLORIDE SERPL-SCNC: 96 MMOL/L (ref 100–108)
CHLORIDE SERPL-SCNC: 97 MMOL/L (ref 100–108)
CO2 SERPL-SCNC: 30 MMOL/L (ref 21–32)
CO2 SERPL-SCNC: 33 MMOL/L (ref 21–32)
CREAT SERPL-MCNC: 1.21 MG/DL (ref 0.6–1.3)
CREAT SERPL-MCNC: 1.26 MG/DL (ref 0.6–1.3)
CRYOGLOB RF SER-ACNC: ABNORMAL [IU]/ML
ERYTHROCYTE [DISTWIDTH] IN BLOOD BY AUTOMATED COUNT: 21 % (ref 11.6–15.1)
GFR SERPL CREATININE-BSD FRML MDRD: 56 ML/MIN/1.73SQ M
GFR SERPL CREATININE-BSD FRML MDRD: 59 ML/MIN/1.73SQ M
GLUCOSE SERPL-MCNC: 140 MG/DL (ref 65–140)
GLUCOSE SERPL-MCNC: 188 MG/DL (ref 65–140)
GLUCOSE SERPL-MCNC: 194 MG/DL (ref 65–140)
GLUCOSE SERPL-MCNC: 246 MG/DL (ref 65–140)
GLUCOSE SERPL-MCNC: 298 MG/DL (ref 65–140)
GLUCOSE SERPL-MCNC: 320 MG/DL (ref 65–140)
HCT VFR BLD AUTO: 23.8 % (ref 36.5–49.3)
HGB BLD-MCNC: 7.6 G/DL (ref 12–17)
MCH RBC QN AUTO: 26 PG (ref 26.8–34.3)
MCHC RBC AUTO-ENTMCNC: 31.9 G/DL (ref 31.4–37.4)
MCV RBC AUTO: 82 FL (ref 82–98)
PLATELET # BLD AUTO: 207 THOUSANDS/UL (ref 149–390)
PMV BLD AUTO: 10 FL (ref 8.9–12.7)
POTASSIUM SERPL-SCNC: 5.4 MMOL/L (ref 3.5–5.3)
POTASSIUM SERPL-SCNC: 5.5 MMOL/L (ref 3.5–5.3)
RBC # BLD AUTO: 2.92 MILLION/UL (ref 3.88–5.62)
RHEUMATOID FACT SER QL LA: POSITIVE
RYE IGE QN: NEGATIVE
SODIUM SERPL-SCNC: 133 MMOL/L (ref 136–145)
SODIUM SERPL-SCNC: 134 MMOL/L (ref 136–145)
WBC # BLD AUTO: 15.29 THOUSAND/UL (ref 4.31–10.16)

## 2021-05-17 PROCEDURE — 82948 REAGENT STRIP/BLOOD GLUCOSE: CPT

## 2021-05-17 PROCEDURE — 80048 BASIC METABOLIC PNL TOTAL CA: CPT | Performed by: NURSE PRACTITIONER

## 2021-05-17 PROCEDURE — 99232 SBSQ HOSP IP/OBS MODERATE 35: CPT | Performed by: INTERNAL MEDICINE

## 2021-05-17 PROCEDURE — 88185 FLOWCYTOMETRY/TC ADD-ON: CPT

## 2021-05-17 PROCEDURE — 85027 COMPLETE CBC AUTOMATED: CPT | Performed by: NURSE PRACTITIONER

## 2021-05-17 RX ADMIN — INSULIN LISPRO 4 UNITS: 100 INJECTION, SOLUTION INTRAVENOUS; SUBCUTANEOUS at 08:25

## 2021-05-17 RX ADMIN — INSULIN LISPRO 2 UNITS: 100 INJECTION, SOLUTION INTRAVENOUS; SUBCUTANEOUS at 17:41

## 2021-05-17 RX ADMIN — PANTOPRAZOLE SODIUM 40 MG: 40 TABLET, DELAYED RELEASE ORAL at 05:23

## 2021-05-17 RX ADMIN — Medication 1 SPRAY: at 20:28

## 2021-05-17 RX ADMIN — SALINE NASAL SPRAY 2 SPRAY: 1.5 SOLUTION NASAL at 20:27

## 2021-05-17 RX ADMIN — SULFAMETHOXAZOLE AND TRIMETHOPRIM 2 TABLET: 800; 160 TABLET ORAL at 21:13

## 2021-05-17 RX ADMIN — FUROSEMIDE 40 MG: 10 INJECTION, SOLUTION INTRAVENOUS at 17:39

## 2021-05-17 RX ADMIN — Medication 1 SPRAY: at 17:42

## 2021-05-17 RX ADMIN — ATORVASTATIN CALCIUM 40 MG: 40 TABLET, FILM COATED ORAL at 17:40

## 2021-05-17 RX ADMIN — MELATONIN TAB 3 MG 6 MG: 3 TAB at 21:13

## 2021-05-17 RX ADMIN — METHYLPREDNISOLONE SODIUM SUCCINATE 60 MG: 125 INJECTION, POWDER, FOR SOLUTION INTRAMUSCULAR; INTRAVENOUS at 08:27

## 2021-05-17 RX ADMIN — FUROSEMIDE 40 MG: 10 INJECTION, SOLUTION INTRAVENOUS at 08:28

## 2021-05-17 RX ADMIN — Medication 250 MG: at 17:40

## 2021-05-17 RX ADMIN — SULFAMETHOXAZOLE AND TRIMETHOPRIM 2 TABLET: 800; 160 TABLET ORAL at 08:34

## 2021-05-17 RX ADMIN — Medication 2000 UNITS: at 08:34

## 2021-05-17 RX ADMIN — FERROUS SULFATE TAB 325 MG (65 MG ELEMENTAL FE) 325 MG: 325 (65 FE) TAB at 08:33

## 2021-05-17 RX ADMIN — Medication 250 MG: at 08:34

## 2021-05-17 RX ADMIN — METOPROLOL SUCCINATE 100 MG: 50 TABLET, EXTENDED RELEASE ORAL at 08:34

## 2021-05-17 RX ADMIN — INSULIN LISPRO 8 UNITS: 100 INJECTION, SOLUTION INTRAVENOUS; SUBCUTANEOUS at 12:23

## 2021-05-17 RX ADMIN — MULTIPLE VITAMINS W/ MINERALS TAB 1 TABLET: TAB ORAL at 08:33

## 2021-05-17 RX ADMIN — METHYLPREDNISOLONE SODIUM SUCCINATE 60 MG: 125 INJECTION, POWDER, FOR SOLUTION INTRAMUSCULAR; INTRAVENOUS at 21:12

## 2021-05-17 RX ADMIN — ASPIRIN 81 MG CHEWABLE TABLET 81 MG: 81 TABLET CHEWABLE at 08:33

## 2021-05-17 NOTE — ASSESSMENT & PLAN NOTE
· Baseline 10  · Bronch - alveolar hemorrhage on 5/15   · S/p 1 unit PRBC 5/15  · Hgb improved, stable 7 6 this AM  · Cont home iron  · Transfuse for Hgb < 7 0    · Trend Hgb daily & PRN

## 2021-05-17 NOTE — ASSESSMENT & PLAN NOTE
· Covid + on 4/6  · Has had two admissions since, 4/6 to 4/10 and 4/15 to 4/20  CXR GGO  CTA neg PE worsening consolidation/b/l pleural effusions  severe COVID-19 tx algorithm/Positive 4/6  IS  Self prone  MEDICATIONS  Solumedrol 125mg q 6hrs D3/3, transitioned to solumedrol 60 mg BID 5/15  Levaquin completed 5/8  Bactrim D6 given concern for PCP pna   Atorvastatin home dose   Enoxaparin therapeutic  Completed Remdesivir 6 doses   Plasma 5/7  Actemra na  INFLAMMATORY MARKERS (trend q 48-72)  CRP 15--from [id number]  Ferritin 331--peak 529  D-Dimer 3 2---peak7 9  Troponin 0 05  BNP 6500 from [id number]  CK 24  PCT 0 13

## 2021-05-17 NOTE — PLAN OF CARE
Problem: RESPIRATORY - ADULT  Goal: Achieves optimal ventilation and oxygenation  Description: INTERVENTIONS:  - Assess for changes in respiratory status  - Assess for changes in mentation and behavior  - Position to facilitate oxygenation and minimize respiratory effort  - Oxygen administered by appropriate delivery if ordered  - Initiate smoking cessation education as indicated  - Encourage broncho-pulmonary hygiene including cough, deep breathe, Incentive Spirometry  - Assess the need for suctioning and aspirate as needed  - Assess and instruct to report SOB or any respiratory difficulty  - Respiratory Therapy support as indicated  5/17/2021 0848 by Tory Rose RN  Outcome: Progressing  5/17/2021 0848 by Tory Rose RN  Outcome: Progressing     Problem: DISCHARGE PLANNING  Goal: Discharge to home or other facility with appropriate resources  Description: INTERVENTIONS:  - Identify barriers to discharge w/patient and caregiver  - Arrange for needed discharge resources and transportation as appropriate  - Identify discharge learning needs (meds, wound care, etc )  - Arrange for interpretive services to assist at discharge as needed  - Refer to Case Management Department for coordinating discharge planning if the patient needs post-hospital services based on physician/advanced practitioner order or complex needs related to functional status, cognitive ability, or social support system  5/17/2021 0848 by Tory Rose RN  Outcome: Progressing  5/17/2021 0848 by Tory Rose RN  Outcome: Progressing     Problem: Knowledge Deficit  Goal: Patient/family/caregiver demonstrates understanding of disease process, treatment plan, medications, and discharge instructions  Description: Complete learning assessment and assess knowledge base    Interventions:  - Provide teaching at level of understanding  - Provide teaching via preferred learning methods  5/17/2021 0848 by Tory Rose RN  Outcome: Progressing  5/17/2021 0848 by Ye Reilly RN  Outcome: Progressing     Problem: PAIN - ADULT  Goal: Verbalizes/displays adequate comfort level or baseline comfort level  Description: Interventions:  - Encourage patient to monitor pain and request assistance  - Assess pain using appropriate pain scale  - Administer analgesics based on type and severity of pain and evaluate response  - Implement non-pharmacological measures as appropriate and evaluate response  - Consider cultural and social influences on pain and pain management  - Notify physician/advanced practitioner if interventions unsuccessful or patient reports new pain  5/17/2021 0848 by Ye Reilly RN  Outcome: Progressing  5/17/2021 0848 by Ye Reilly RN  Outcome: Progressing     Problem: INFECTION - ADULT  Goal: Absence or prevention of progression during hospitalization  Description: INTERVENTIONS:  - Assess and monitor for signs and symptoms of infection  - Monitor lab/diagnostic results  - Monitor all insertion sites, i e  indwelling lines, tubes, and drains  - Monitor endotracheal if appropriate and nasal secretions for changes in amount and color  - [place] appropriate cooling/warming therapies per order  - Administer medications as ordered  - Instruct and encourage patient and family to use good hand hygiene technique  - Identify and instruct in appropriate isolation precautions for identified infection/condition  5/17/2021 0848 by Ye Reilly RN  Outcome: Progressing  5/17/2021 0848 by Ye Reilly RN  Outcome: Progressing     Problem: SAFETY ADULT  Goal: Patient will remain free of falls  Description: INTERVENTIONS:  - Assess patient frequently for physical needs  -  Identify cognitive and physical deficits and behaviors that affect risk of falls    -  [place] fall precautions as indicated by assessment   - Educate patient/family on patient safety including physical limitations  - Instruct patient to call for assistance with activity based on assessment  - Modify environment to reduce risk of injury  - Consider OT/PT consult to assist with strengthening/mobility  5/17/2021 0848 by Mara Magana RN  Outcome: Progressing  5/17/2021 0848 by Mara Magana RN  Outcome: Progressing  Goal: Maintain or return to baseline ADL function  Description: INTERVENTIONS:  -  Assess patient's ability to carry out ADLs; assess patient's baseline for ADL function and identify physical deficits which impact ability to perform ADLs (bathing, care of mouth/teeth, toileting, grooming, dressing, etc )  - Assess/evaluate cause of self-care deficits   - Assess range of motion  - Assess patient's mobility; develop plan if impaired  - Assess patient's need for assistive devices and provide as appropriate  - Encourage maximum independence but intervene and supervise when necessary  - Involve family in performance of ADLs  - Assess for home care needs following discharge   - Consider OT consult to assist with ADL evaluation and planning for discharge  - Provide patient education as appropriate  5/17/2021 0848 by Mara Magana RN  Outcome: Progressing  5/17/2021 0848 by Mara Magana RN  Outcome: Progressing  Goal: Maintain or return mobility status to optimal level  Description: INTERVENTIONS:  - Assess patient's baseline mobility status (ambulation, transfers, stairs, etc )    - Identify cognitive and physical deficits and behaviors that affect mobility  - Identify mobility aids required to assist with transfers and/or ambulation (gait belt, sit-to-stand, lift, walker, cane, etc )  - [place] fall precautions as indicated by assessment  - Record patient progress and toleration of activity level on Mobility SBAR; progress patient to next Phase/Stage  - Instruct patient to call for assistance with activity based on assessment  - Consider rehabilitation consult to assist with strengthening/weightbearing, etc   5/17/2021 0848 by Rivera Beal Steven Saha RN  Outcome: Progressing  5/17/2021 0848 by Yunior Holly RN  Outcome: Progressing     Problem: Potential for Falls  Goal: Patient will remain free of falls  Description: INTERVENTIONS:  - Assess patient frequently for physical needs  -  Identify cognitive and physical deficits and behaviors that affect risk of falls    -  [place] fall precautions as indicated by assessment   - Educate patient/family on patient safety including physical limitations  - Instruct patient to call for assistance with activity based on assessment  - Modify environment to reduce risk of injury  - Consider OT/PT consult to assist with strengthening/mobility  5/17/2021 0848 by Yunior Holly RN  Outcome: Progressing  5/17/2021 0848 by Yunior Holly RN  Outcome: Progressing     Problem: Prexisting or High Potential for Compromised Skin Integrity  Goal: Skin integrity is maintained or improved  Description: INTERVENTIONS:  - Identify patients at risk for skin breakdown  - Assess and monitor skin integrity  - Assess and monitor nutrition and hydration status  - Monitor labs   - Assess for incontinence   - Turn and reposition patient  - Assist with mobility/ambulation  - Relieve pressure over bony prominences  - Avoid friction and shearing  - Provide appropriate hygiene as needed including keeping skin clean and dry  - Evaluate need for skin moisturizer/barrier cream  - Collaborate with interdisciplinary team   - Patient/family teaching  - Consider wound care consult   5/17/2021 0848 by Yunior Holly RN  Outcome: Progressing  5/17/2021 0848 by Yunior Holly RN  Outcome: [address]

## 2021-05-17 NOTE — ASSESSMENT & PLAN NOTE
· Patient is on Eliquis as outpatient  · Rate controlled on Lopressor  · Stopped AC & DVT prophylaxis due to alveolar hemorrhage

## 2021-05-17 NOTE — PROGRESS NOTES
Pastoral Care Progress Note    2021  Patient: Michelle Anderson : 1945  Admission Date & Time: 2021 1428  MRN: [id number] CSN: [id number]                     Chaplaincy Interventions Utilized:   Relationship Building: Cultivated a relationship of care and support  Patient indicated that he was feeling somewhat better than the last time I saw him 5 days ago  He seemed more hopeful that he was getting better      Ritual: Provided prayer     21 1300   Clinical Encounter Type   Visited With Patient   Routine Visit Follow-up   Crisis Visit Critical Care   Hinduism Encounters   Hinduism Needs Prayer        21 1300   Clinical Encounter Type   Visited With Patient   Routine Visit Follow-up   Crisis Visit Critical Care   Hinduism Encounters   Hinduism Needs Prayer

## 2021-05-17 NOTE — ASSESSMENT & PLAN NOTE
Wt Readings from Last 3 Encounters:   05/16/21 69 9 kg (154 lb 1 6 oz)   04/20/21 73 2 kg (161 lb 6 oz)   04/10/21 76 8 kg (169 lb 5 oz)       · Echocardiogram shows EF 40% diffuse hypokinesis bio AVR from EF 60% in 2019  · BNP 11000--down to 6500  · Lasix 40mg IV BID  · Fio2 requirements decreasing   · CTA b/l Pleural effusions greater on right  · Goal negative 500 - 1 L   · I/o  · Daily weight

## 2021-05-17 NOTE — ASSESSMENT & PLAN NOTE
· Secondary to COVID-19 versus PCP PNA vs fibrosis  · Admit 5/2 transferred on 05/12 for high-flow nasal cannula  · CTA -PE, worsening bilateral infiltrates and pleural effusions  · Recently had PET scan completed an outpatient which shows no lymph nodes eliminated but reactive inflammatory response versus tumors  · ID consulted and appreciate recs  · Solumedrol 125mg q 6hrs completed D3/3 on 5/14  · Continue solumedrol 60 mg q12 hr d#3  · Bactrim D6  · 5/8 levaquin completed  · Lasix BID   · Currently on NC 4-6 L, decrease FiO2 to keep O2 sats 88% or greater  · S/p Bronch 5/15 revealing alveolar hemorrhage, pending further study results from obtained samples

## 2021-05-17 NOTE — ASSESSMENT & PLAN NOTE
Lab Results   Component Value Date    HGBA1C 7 4 (H) 05/13/2021       Recent Labs     05/16/21  0627 05/16/21  1105 05/16/21  1628 05/16/21 2109   POCGLU 206* 249* 227* 156*       Blood Sugar Average: Last 72 hrs:  (P) 206 475     · Holding home metformin  · Continue SSI,  Currently on algorithm 4   · 3 units TID with meals    · Goal glucose < 180

## 2021-05-17 NOTE — PROGRESS NOTES
Laury Drain  77 y o   male  1945  mrn [id number]    Assessment/Plan:  1  Sepsis/?PCP/? COVID19 infxn with pneumonia/Fever/Leukocytosis/ Hypoxia: Remains afebrile on steroids and WBC count has decreased to 15K - leukocytosis is probably due to steroid tx  Respiratory status has improved and supplemental O2 has decreased to [address] 4-6 L on higher steroid dose  Repeat COVID19 test was also neg so isolation was discontinued  Pt underwent bronch on 5/15 and cx results are pending  Pt noted to have findings suggestive of alveolar hemorrhage    New infxn was considered because pulmonary status markedly deteriorated again after steroid dose was decreased  Repeat CXR showed increased bilat pulmonary infilts  Pt was placed on Bactrim for possibility of PCP since he's at increased risk for this infxn due to his underlying lymphoma and MM  Pt was transferred to ICU on 5/12 and supplemental O2 was increased to HFNC 40L/70%   LDH is 323 and beta-D-glucan test is pending  D-dimer increased to 7 93 but decreased to 3 90 - ?possibility of PE as contributing to worsening hypoxia  Procalcitonin level is not elevated     Pt was started on tx for presumed ongoing COVID19 infxn with steroids and Remdesivir on 5/7/21 because COVID19 Ab was neg  Pt was also   given convalescent plasma  CRP has decreased to 19 7 on high dose Solumedrol  Pt completed 7 day course of Levaquin on 5/8 for possible pneumonia     Pt's supplemental O2 requirement increased to 15L by [facility] on 5/7  He was transferred to SD 2 on 5/8 because of worsening hypoxia  Chest CT showed increased pulmonary infilts but no PE  COVID19 IgG Ab was neg which indicated lack of development of immunity to recent COVID19 infxn due to his underlying lymphoma  Concerned that respiratory deterioration was due to ongoing COVID19 infxn  Pts with lymphoma are known to clear the virus more slowly  Repeat CRP had increased to 160 1 and Ferritin was 348   Pt received tx with IVIG on 5/6 as directed by his Oncologist which probably had some COVID19 Ab's     BNP was markedly elevated so there was probably a component of vol overload that was contributing to his current pulmonary sx's  He is being diuresed  Repeat BNP decreased to 6501  Repeat CXR did not show any signficant improvement with mild increase in RUL infilt      On admission, Pt had fever and leukocytosis, so need to consider possibility of pneumonia  Procalcitonin level was not markedly elevated but lactic acid was increased suggestive of sepsis  Urine Legionella Ag and Strep pneumo Ag were neg  Bld cx's are neg  MRSA screen  Inflammatory markers were still elevated from his recent COVID19 infxn which was tx'd with Remdesivir and decadron during his previous  admission from 4/6 to 4/10/21  He was re-admitted from 4/15 to 4/20 with increasing SOB and was tx'd with steroids       Pt presented with acute onset of increasing SOB and cough x 1 day       A  Cont Bactrim while bronch studies are pending to exclude PCP - PCR for PCP was ordered to be added to the bronch specimen obtained on 5/15  B  Need to follow creatinine and potassium while Pt is on tx with Bactrim - dose has been adjusted for mild increase in his creatinine - Serum potassium is 5 4  C  Awaiting results of bronch studies  D  Awaiting results of Beta-D-glucan to evaluate for PCP   E  Will follow WBC count which may stay elevated due to high dose steroid tx  F  D-dimer is back down to 3 90 - cont anticoagulation as per Intensivist service - ?whether Pt is stable enough to undergo another chest CT to look for presence of PE  G  BNP has decreased to 3303 - cont diuresis as needed  H  Enhanced respiratory isolation was discontinued since second COVID19 test was neg - Pts with underlying lymphoma are known to take longer to clear the COVID19 virus so needed two neg COVID19 tests to take Pt out of isolation - s/p tx with convalescent plasma  I   Pt  completed 6 doses of Remdesivir which was discontinued when second COVID19 test was neg  J  Cont steroid tx as per Pulmonary  K  Cont supplemental O2 as needed         Subjective: Breathing is better - supplemental O2 has decreased to 4-6L by [address] [facility]    Objective:  Tmax: 97 7  HEENT: +MFNC  Lungs: +Coarse BS  Abd: +BS, soft, nontender  Ext: No calf tenderness    Labs:  CBC w/diff  Recent Labs     05/17/21  0419   WBC 15 29*   HGB 7 6*   HCT 23 8*        BMP  Recent Labs     05/17/21  0903   K 5 4*   CL 96*   CO2 33*   BUN 43*   CREATININE 1 21   CALCIUM 8 2*     CMP  Recent Labs     05/17/21  0903   K 5 4*   CL 96*   CO2 33*   BUN 43*   CREATININE 1 21   CALCIUM 8 2*         labrc    Cultures:  Lab Results   Component Value Date    BLOODCX No Growth After 5 Days  05/02/2021    BLOODCX No Growth After 5 Days  05/02/2021    BLOODCX No Growth After 5 Days  04/15/2021    BLOODCX No Growth After 5 Days  04/15/2021    BLOODCX No Growth After 5 Days  04/06/2021    BLOODCX No Growth After 5 Days  04/06/2021    BLOODCX No Growth After 5 Days  04/01/2021    BLOODCX No Growth After 5 Days  04/01/2021    BLOODCX No Growth After 5 Days  02/27/2019    BLOODCX No Growth After 5 Days  02/27/2019    BLOODCX No Growth After 5 Days  11/25/2018    BLOODCX No Growth After 5 Days   11/25/2018     No results found for: WOUNDCULT  No results found for: URINECX  No results found for: SPUTUMCULTUR    MED:  Bactrim: #6      Completed  Remdesivir x 6 days on 5/12           Completed:  Oley Sales on 5/5   Levaquin x 7 days on 5/8            Current Facility-Administered Medications:     acetaminophen (TYLENOL) tablet 650 mg, 650 mg, Oral, Q6H PRN, Pearletha Panguitch, CRNP    aspirin chewable tablet 81 mg, 81 mg, Oral, Daily, Pearletha Panguitch, CRNP, 81 mg at 05/17/21 1082    atorvastatin (LIPITOR) tablet 40 mg, 40 mg, Oral, Daily With Dinner, Pearletha Panguitch, CRNP, 40 mg at 05/16/21 1709    cholecalciferol (VITAMIN D3) tablet 2,000 Units, 2,000 Units, Oral, Daily, DUNG Jimenez, 2,000 Units at 05/17/21 1667    ferrous sulfate tablet 325 mg, 325 mg, Oral, Daily With Breakfast, Cinda Orn, CRNP, 325 mg at 05/17/21 2688    furosemide (LASIX) injection 40 mg, 40 mg, Intravenous, BID (diuretic), Marcela Cartwright, GRACIANP, 40 mg at 05/17/21 6308    insulin lispro (HumaLOG) 100 units/mL subcutaneous injection 1-6 Units, 1-6 Units, Subcutaneous, HS, DUNG José, 1 Units at 05/16/21 2113    insulin lispro (HumaLOG) 100 units/mL subcutaneous injection 2-12 Units, 2-12 Units, Subcutaneous, TID AC, 8 Units at 05/17/21 1223 **AND** Fingerstick Glucose (POCT), , , TID AC, DUNG Russell    insulin lispro (HumaLOG) 100 units/mL subcutaneous injection 3 Units, 3 Units, Subcutaneous, TID With Meals, DUNG Gates, 3 Units at 05/17/21 1223    melatonin tablet 6 mg, 6 mg, Oral, HS, DUNG Vila, 6 mg at 05/16/21 2113    methylPREDNISolone sodium succinate (Solu-MEDROL) injection 60 mg, 60 mg, Intravenous, Q12H [address], DUNG Russell, 60 mg at 05/17/21 0827    metoprolol succinate (TOPROL-XL) 24 hr tablet 100 mg, 100 mg, Oral, Daily, DUNG Vila, 100 mg at 05/17/21 0834    multivitamin-minerals (CENTRUM ADULTS) tablet 1 tablet, 1 tablet, Oral, Daily, DUNG Vila, 1 tablet at 05/17/21 0833    nitroglycerin (NITROSTAT) SL tablet 0 4 mg, 0 4 mg, Sublingual, Q5 Min PRN, DUNG Vila    ondansetron [facility]) injection 4 mg, 4 mg, Intravenous, Q6H PRN, DUNG Vila    pantoprazole (PROTONIX) EC tablet 40 mg, 40 mg, Oral, Early Morning, DUNG Vila, 40 mg at 05/17/21 0523    polyethylene glycol (MIRALAX) packet 17 g, 17 g, Oral, Daily PRN, Marcela Cartwright, CRNP    saccharomyces boulardii (FLORASTOR) capsule 250 mg, 250 mg, Oral, BID, Carole Howe, CRNP, 250 mg at 05/17/21 0834    simethicone (MYLICON) chewable tablet 80 mg, 80 mg, Oral, Q6H PRN, Carole Howe CRNP, 80 mg at 05/08/21 1101    sodium chloride (OCEAN) 0 65 % nasal spray 2 spray, 2 spray, Each Nare, Q1H PRN, DUNG Keenan    sulfamethoxazole-trimethoprim (BACTRIM DS) 800-160 mg per tablet 2 tablet, 2 tablet, Oral, Q12H [facility], Hedwig Kehr, MD, 2 tablet at 05/17/21 0563    Principal Problem:    Acute respiratory failure with hypoxia [facility])  Active Problems:    Coronary disease    Type 2 diabetes mellitus without complication, without long-term current use of insulin (HCC)    Lymphoma (HCC)    Essential hypertension    Chronic diastolic congestive heart failure (HCC)    Leukocytosis    Paroxysmal atrial fibrillation (HCC)    Gastroesophageal reflux disease with esophagitis    Pneumonia due to COVID-19 virus    Acute on chronic blood loss anemia    Pulmonary alveolar hemorrhage      Hedwig Kehr, MD

## 2021-05-17 NOTE — ASSESSMENT & PLAN NOTE
· Follows with Dr Rebeca Yoo at [facility] [phone]  · Patient is considering lung biopsy, however he is currently not stable enough to undergo biopsy  · PET scan done at [facility] concerning for limiting areas in question of possibility to of reactive inflammatory response versus tumors  · ----PET SCAN 4/31 POST COVID DIAGNOSIS ??? SIGNIFICANCE  · Patient will need outpatient follow-up with oncologist  · Received IVIG on 05/06  · Last chemo 2015  · S/p Bronch 5/15 - noted alveolar hemorrhage - pending serology including ANCA, PCP, flow cytometry and cytology and cell count

## 2021-05-17 NOTE — ASSESSMENT & PLAN NOTE
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

## 2021-05-17 NOTE — PROGRESS NOTES
[place]  Progress Note - Jayne Maldonado 1945, 77 y o  male MRN: [id number]  Unit/Bed#: -01 Encounter: [id number]  Primary Care Provider: Brenda Chicas DO   Date and time admitted to hospital: 5/2/2021  2:28 PM    Pneumonia due to COVID-19 virus  Assessment & Plan  · Covid + on 4/6  · Has had two admissions since, 4/6 to 4/10 and 4/15 to 4/20  CXR GGO  CTA neg PE worsening consolidation/b/l pleural effusions  severe COVID-19 tx algorithm/Positive 4/6  IS  Self prone  MEDICATIONS  Solumedrol 125mg q 6hrs D3/3, transitioned to solumedrol 60 mg BID 5/15  Levaquin completed 5/8  Bactrim D6 given concern for PCP pna   Atorvastatin home dose   Enoxaparin therapeutic  Completed Remdesivir 6 doses   Plasma 5/7  Actemra na  INFLAMMATORY MARKERS (trend q 48-72)  CRP 15--from [id number]  Ferritin 331--peak 529  D-Dimer 3 2---peak7 9  Troponin 0 05  BNP 6500 from [id number]  CK 24  PCT 0 13    * Acute respiratory failure with hypoxia (HCC)  Assessment & Plan  · Secondary to COVID-19 versus PCP PNA vs fibrosis  · Admit 5/2 transferred on 05/12 for high-flow nasal cannula  · CTA -PE, worsening bilateral infiltrates and pleural effusions  · Recently had PET scan completed an outpatient which shows no lymph nodes eliminated but reactive inflammatory response versus tumors  · ID consulted and appreciate recs  · Solumedrol 125mg q 6hrs completed D3/3 on 5/14  · Continue solumedrol 60 mg q12 hr d#3  · Bactrim D6  · 5/8 levaquin completed  · Lasix BID   · Currently on NC 4-6 L, decrease FiO2 to keep O2 sats 88% or greater  · S/p Bronch 5/15 revealing alveolar hemorrhage, pending further study results from obtained samples     Pulmonary alveolar hemorrhage  Assessment & Plan  · S/p Bronch 5/15 - imaging available in media   · Pending results from samples obtained   · S/p 1 units PRBC on 5/15 due tp Hgb drop   · 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, fibrinogen was 211, INR WNL, D-dimer trending down, Plt WNL  · Intermittent hemoptysis   · AC & DVT prophylaxis stopped     Leukocytosis  Assessment & Plan  · Likely reactive due to steroid use   · Noted 1 band on CBC with diff 5/14  · Trend CBC & temp   · ID following     Acute on chronic blood loss anemia  Assessment & Plan  · Baseline 10  · Bronch - alveolar hemorrhage on 5/15   · S/p 1 unit PRBC 5/15  · Hgb improved, stable 7 6 this AM  · Cont home iron  · Transfuse for Hgb < 7 0    · Trend Hgb daily & PRN     Essential hypertension  Assessment & Plan  · Continue Toprol/lisinopril    Type 2 diabetes mellitus without complication, without long-term current use of insulin [facility])  Assessment & Plan  Lab Results   Component Value Date    HGBA1C 7 4 (H) 05/13/2021       Recent Labs     05/16/21  0627 05/16/21  1105 05/16/21  1628 05/16/21  2109   POCGLU 206* 249* 227* 156*       Blood Sugar Average: Last 72 hrs:  (P) 206 475     · Holding home metformin  · Continue SSI,  Currently on algorithm 4   · 3 units TID with meals    · Goal glucose < 180      Gastroesophageal reflux disease with esophagitis  Assessment & Plan  · Home PPI     Paroxysmal atrial fibrillation (HCC)  Assessment & Plan  · Patient is on Eliquis as outpatient  · Rate controlled on Lopressor  · Stopped AC & DVT prophylaxis due to alveolar hemorrhage     Chronic diastolic congestive heart failure (HCC)  Assessment & Plan  Wt Readings from Last 3 Encounters:   05/16/21 69 9 kg (154 lb 1 6 oz)   04/20/21 73 2 kg (161 lb 6 oz)   04/10/21 76 8 kg (169 lb 5 oz)       · Echocardiogram shows EF 40% diffuse hypokinesis bio AVR from EF 60% in 2019  · BNP 11000--down to 6500  · Lasix 40mg IV BID  · Fio2 requirements decreasing   · CTA b/l Pleural effusions greater on right  · Goal negative 500 - 1 L   · I/o  · Daily weight      Lymphoma ([address] )  Assessment & Plan  · Follows with Dr Nolvia Morales at [facility] [phone]  · Patient is considering lung biopsy, however he is currently not stable enough to undergo biopsy  · PET scan done at [facility] concerning for limiting areas in question of possibility to of reactive inflammatory response versus tumors  · ----PET SCAN  POST COVID DIAGNOSIS ??? SIGNIFICANCE  · Patient will need outpatient follow-up with oncologist  · Received IVIG on   · Last chemo   · S/p Bronch 5/15 - noted alveolar hemorrhage - pending serology including ANCA, PCP, flow cytometry and cytology and cell count    Coronary disease  Assessment & Plan  · Status post CABG  · Continue aspirin, statin, beta-blocker, aCEI    ----------------------------------------------------------------------------------------  HPI/24hr events: No acute events overnight Remained on midflow 4-6 L NC  Hgb stable this AM      Disposition: Transfer to Med-Surg   Code Status: Level 1 - Full Code  ---------------------------------------------------------------------------------------  SUBJECTIVE  "I'm fine, ready to get out of here"     Review of Systems   Constitutional: Negative  HENT: Negative  Eyes: Negative  Respiratory: Positive for cough  Cardiovascular: Negative  Gastrointestinal: Negative  Endocrine: Negative  Genitourinary: Negative  Musculoskeletal: Negative  Skin: Negative  Allergic/Immunologic: Negative  Neurological: Negative  Hematological: Negative      Psychiatric/Behavioral: Negative       ---------------------------------------------------------------------------------------  OBJECTIVE    Vitals   Vitals:    05/213 21 0414   BP:  112/56 127/59    BP Location:  Left arm Left arm    Pulse:  59 64    Resp:  21 20    Temp:  (!) 97 4 °F (36 3 °C) (!) 97 1 °F (36 2 °C)    TempSrc:  Oral Oral    SpO2: 92% 94% 91% 90%   Weight:    70 1 kg (154 lb 8 7 oz)   Height:         Temp (24hrs), Av 5 °F (36 4 °C), Min:97 1 °F (36 2 °C), Max:97 7 °F (36 5 °C)  Current: Temperature: (!) 97 1 °F (36 2 °C) Respiratory:  SpO2: SpO2: 90 %  Nasal Cannula O2 Flow Rate (L/min): 6 L/min    Invasive/non-invasive ventilation settings   Respiratory    Lab Data (Last 4 hours)    None         O2/Vent Data (Last 4 hours)    None                Physical Exam  Constitutional:       General: He is not in acute distress  Appearance: Normal appearance  HENT:      Head: Normocephalic and atraumatic  Right Ear: External ear normal       Left Ear: External ear normal       Nose: Nose normal       Mouth/Throat:      Mouth: Mucous membranes are moist       Pharynx: Oropharynx is clear  Eyes:      Extraocular Movements: Extraocular movements intact  Conjunctiva/sclera: Conjunctivae normal       Pupils: Pupils are equal, round, and reactive to light  Neck:      Musculoskeletal: Normal range of motion  Cardiovascular:      Rate and Rhythm: Normal rate and regular rhythm  Pulses: Normal pulses  Heart sounds: Normal heart sounds  Pulmonary:      Effort: Pulmonary effort is normal       Breath sounds: Examination of the right-lower field reveals decreased breath sounds  Examination of the left-lower field reveals decreased breath sounds  Decreased breath sounds present  Abdominal:      General: Bowel sounds are normal  There is no distension  Palpations: Abdomen is soft  Tenderness: There is no abdominal tenderness  Musculoskeletal: Normal range of motion  Lymphadenopathy:      Cervical: No cervical adenopathy  Skin:     General: Skin is warm and dry  Capillary Refill: Capillary refill takes less than 2 seconds  Neurological:      Mental Status: He is alert and oriented to person, place, and time     Psychiatric:         Speech: Speech normal          Cognition and Memory: Cognition and memory normal          Laboratory and Diagnostics:  Results from last 7 days   Lab Units 05/17/21  0419 05/16/21  1014 05/16/21  0502 05/15/21  2022 05/15/21  1211 05/15/21  0403 05/14/21  0408 05/13/21  0503 05/12/21  0329 05/11/21  0832 05/10/21  0531   WBC Thousand/uL 15 29*  --  18 36*  --   --  19 18* 25 06* 16 61* 17 80* 14 73* 13 47*   HEMOGLOBIN g/dL 7 6*  --  7 4* 7 5* 6 7* 7 0* 8 0* 7 8* 8 2* 8 1* 8 2*   HEMATOCRIT % 23 8*  --  23 6*  --   --  23 9* 26 5* 26 5* 27 4* 27 3* 27 6*   PLATELETS Thousands/uL 207 289 241  --   --  287 389 280 327 191 332   NEUTROS PCT %  --   --   --   --   --   --   --  89* 88* 81* 87*   BANDS PCT %  --   --   --   --   --   --  1  --   --   --   --    MONOS PCT %  --   --   --   --   --   --   --  3* 4 7 6   MONO PCT %  --   --   --   --   --   --  6  --   --   --   --      Results from last 7 days   Lab Units 05/17/21  0420 05/16/21  0502 05/15/21  0403 05/14/21  0408 05/13/21  0503 05/12/21  0329 05/11/21  0832 05/10/21  0531   SODIUM mmol/L 134* 137 140 139 142 142 142 141   POTASSIUM mmol/L 5 5* 4 5 4 3 4 2 3 7 4 2 3 6 3 6   CHLORIDE mmol/L 97* 99* 99* 98* 98* 101 101 100   CO2 mmol/L 30 36* 36* 36* 38* 31 37* 34*   ANION GAP mmol/L 7 2* 5 5 6 10 4 7   BUN mg/dL 46* 49* 43* 36* 29* 27* 30* 31*   CREATININE mg/dL 1 26 1 26 1 12 1 07 0 95 0 96 1 07 1 11   CALCIUM mg/dL 7 8* 7 3* 7 7* 7 9* 7 6* 7 9* 7 9* 8 0*   GLUCOSE RANDOM mg/dL 194* 202* 86 142* 246* 220* 164* 168*   ALT U/L  --   --   --   --  31 33 38 42   AST U/L  --   --   --   --  18 18 23 28   ALK PHOS U/L  --   --   --   --  139* 142* 120* 124*   ALBUMIN g/dL  --   --   --   --  2 5* 2 4* 2 0* 2 1*   TOTAL BILIRUBIN mg/dL  --   --   --   --  0 80 0 90 0 80 0 70     Results from last 7 days   Lab Units 05/13/21  0503   MAGNESIUM mg/dL 2 3      Results from last 7 days   Lab Units 05/16/21  1014   INR  1 12   PTT seconds 27              ABG:    VBG:    Results from last 7 days   Lab Units 05/16/21  0502 05/15/21  0403 05/13/21  0503 05/12/21  1139   PROCALCITONIN ng/ml 0 07 0 10 0 16 0 13       Micro        EKG: SR   Imaging: I have personally reviewed pertinent reports        Intake and Output  I/O 05/15 0701 - 05/16 0700 05/16 0701 - 05/17 0700    P  O  1200 1580    Blood 350     Total Intake(mL/kg) 1550 (22 2) 1580 (22 6)    Urine (mL/kg/hr) 2295 (1 4) 2080 (1 2)    Total Output 2295 2080    Net -747 -995                Height and Weights   Height: 5' 11" (180 3 cm)  IBW (Ideal Body Weight): 75 3 kg  Body mass index is 21 55 kg/m²  Weight (last 2 days)     Date/Time   Weight    05/17/21 0414   70 1 (154 54)    05/16/21 0546   69 9 (154 1)    05/15/21 0528   69 7 (153 66)                Nutrition       Diet Orders   (From admission, onward)             Start     Ordered    05/16/21 0949  Diet Shaw/CHO Controlled; Consistent Carbohydrate Diet Level 1 (4 carb servings/60 grams CHO/meal)  Diet effective now     Question Answer Comment   Diet Type Shaw/CHO Controlled    Shaw/CHO Controlled Consistent Carbohydrate Diet Level 1 (4 carb servings/60 grams CHO/meal)    RD to adjust diet per protocol?  Yes        05/16/21 0949                  Active Medications  Scheduled Meds:  Current Facility-Administered Medications   Medication Dose Route Frequency Provider Last Rate    acetaminophen  650 mg Oral Q6H PRN Violetta RayDUNG      aspirin  81 mg Oral Daily Evbenie DUNG [place]      atorvastatin  40 mg Oral Daily With [facility] DrDUNG      cholecalciferol  2,000 Units Oral Daily Bessiee RayDUNG      ferrous sulfate  325 mg Oral Daily With Breakfast Lodema BameDUNG      furosemide  40 mg Intravenous BID (diuretic) DUNG Arcos      insulin lispro  1-6 Units Subcutaneous HS DUNG Donaldson      insulin lispro  2-12 Units Subcutaneous TID AC DUNG Russell      insulin lispro  3 Units Subcutaneous TID With Meals DUNG Arcos      melatonin  6 mg Oral HS DUNG Bridges      methylPREDNISolone sodium succinate  60 mg Intravenous Q12H [address] DUNG Russell      metoprolol succinate  100 mg Oral Daily DUNG Bridges      multivitamin-minerals  1 tablet Oral Daily Aleshia Watt, DUNG      nitroglycerin  0 4 mg Sublingual Q5 Min PRN Aleshia Watt, GRACIANP      ondansetron  4 mg Intravenous Q6H PRN Aleshia Watt, CRNP      pantoprazole  40 mg Oral Early Morning Aleshia Watt, CRNP      polyethylene glycol  17 g Oral Daily PRN Ivet Gutierrez, CRNP      saccharomyces boulardii  250 mg Oral BID Aleshia Watt, CRNP      simethicone  80 mg Oral Q6H PRN Aleshia Watt, CRNP      sodium chloride  2 spray Each Nare Q1H PRN Dominga Arambula, DUNG      sulfamethoxazole-trimethoprim  2 tablet Oral Q12H Godwin Lomax MD       Continuous Infusions:     PRN Meds:   acetaminophen, 650 mg, Q6H PRN  nitroglycerin, 0 4 mg, Q5 Min PRN  ondansetron, 4 mg, Q6H PRN  polyethylene glycol, 17 g, Daily PRN  simethicone, 80 mg, Q6H PRN  sodium chloride, 2 spray, Q1H PRN        Invasive Devices Review  Invasive Devices     Peripheral Intravenous Line            Peripheral IV 05/16/21 Left Forearm less than 1 day                Rationale for remaining devices: n/a  ---------------------------------------------------------------------------------------  Advance Directive and Living Will:      Power of :    POLST:    ---------------------------------------------------------------------------------------  Care Time Delivered:   No Critical Care time spent       DUNG Chandra      Portions of the record may have been created with voice recognition software  Occasional wrong word or "sound a like" substitutions may have occurred due to the inherent limitations of voice recognition software    Read the chart carefully and recognize, using context, where substitutions have occurred

## 2021-05-18 PROBLEM — K12.1 MOUTH ULCERS: Status: ACTIVE | Noted: 2021-05-18

## 2021-05-18 PROBLEM — I50.21 ACUTE SYSTOLIC CONGESTIVE HEART FAILURE (HCC): Status: ACTIVE | Noted: 2019-02-14

## 2021-05-18 LAB
ANION GAP SERPL CALCULATED.3IONS-SCNC: 3 MMOL/L (ref 4–13)
BUN SERPL-MCNC: 39 MG/DL (ref 5–25)
CALCIUM SERPL-MCNC: 8.1 MG/DL (ref 8.3–10.1)
CCP AB SER IA-ACNC: <0.4
CHLORIDE SERPL-SCNC: 94 MMOL/L (ref 100–108)
CO2 SERPL-SCNC: 34 MMOL/L (ref 21–32)
CREAT SERPL-MCNC: 1.15 MG/DL (ref 0.6–1.3)
DSDNA AB SER-ACNC: <1 IU/ML (ref 0–9)
ENA SS-A AB SER-ACNC: <0.2 AI (ref 0–0.9)
ENA SS-B AB SER-ACNC: <0.2 AI (ref 0–0.9)
ERYTHROCYTE [DISTWIDTH] IN BLOOD BY AUTOMATED COUNT: 21.2 % (ref 11.6–15.1)
GFR SERPL CREATININE-BSD FRML MDRD: 62 ML/MIN/1.73SQ M
GLUCOSE SERPL-MCNC: 193 MG/DL (ref 65–140)
GLUCOSE SERPL-MCNC: 195 MG/DL (ref 65–140)
GLUCOSE SERPL-MCNC: 200 MG/DL (ref 65–140)
GLUCOSE SERPL-MCNC: 240 MG/DL (ref 65–140)
GLUCOSE SERPL-MCNC: 257 MG/DL (ref 65–140)
HCT VFR BLD AUTO: 25.3 % (ref 36.5–49.3)
HGB BLD-MCNC: 7.9 G/DL (ref 12–17)
MCH RBC QN AUTO: 25.3 PG (ref 26.8–34.3)
MCHC RBC AUTO-ENTMCNC: 31.2 G/DL (ref 31.4–37.4)
MCV RBC AUTO: 81 FL (ref 82–98)
PLATELET # BLD AUTO: 206 THOUSANDS/UL (ref 149–390)
PMV BLD AUTO: 10.2 FL (ref 8.9–12.7)
POTASSIUM SERPL-SCNC: 5.2 MMOL/L (ref 3.5–5.3)
RBC # BLD AUTO: 3.12 MILLION/UL (ref 3.88–5.62)
SODIUM SERPL-SCNC: 131 MMOL/L (ref 136–145)
WBC # BLD AUTO: 17.34 THOUSAND/UL (ref 4.31–10.16)

## 2021-05-18 PROCEDURE — 82948 REAGENT STRIP/BLOOD GLUCOSE: CPT

## 2021-05-18 PROCEDURE — 99232 SBSQ HOSP IP/OBS MODERATE 35: CPT | Performed by: INTERNAL MEDICINE

## 2021-05-18 PROCEDURE — 80048 BASIC METABOLIC PNL TOTAL CA: CPT | Performed by: NURSE PRACTITIONER

## 2021-05-18 PROCEDURE — 85027 COMPLETE CBC AUTOMATED: CPT | Performed by: NURSE PRACTITIONER

## 2021-05-18 PROCEDURE — 99233 SBSQ HOSP IP/OBS HIGH 50: CPT | Performed by: INTERNAL MEDICINE

## 2021-05-18 RX ORDER — INSULIN GLARGINE 100 [IU]/ML
8 INJECTION, SOLUTION SUBCUTANEOUS
Status: DISCONTINUED | OUTPATIENT
Start: 2021-05-18 | End: 2021-05-18

## 2021-05-18 RX ORDER — FUROSEMIDE 10 MG/ML
40 INJECTION INTRAMUSCULAR; INTRAVENOUS DAILY
Status: DISCONTINUED | OUTPATIENT
Start: 2021-05-18 | End: 2021-05-19

## 2021-05-18 RX ORDER — METHYLPREDNISOLONE SODIUM SUCCINATE 125 MG/2ML
60 INJECTION, POWDER, LYOPHILIZED, FOR SOLUTION INTRAMUSCULAR; INTRAVENOUS EVERY 12 HOURS SCHEDULED
Status: DISCONTINUED | OUTPATIENT
Start: 2021-05-18 | End: 2021-05-19

## 2021-05-18 RX ORDER — LIDOCAINE HYDROCHLORIDE 20 MG/ML
15 SOLUTION OROPHARYNGEAL 4 TIMES DAILY PRN
Status: DISCONTINUED | OUTPATIENT
Start: 2021-05-18 | End: 2021-05-19

## 2021-05-18 RX ORDER — IBUPROFEN 600 MG/1
600 TABLET ORAL EVERY 8 HOURS SCHEDULED
Status: COMPLETED | OUTPATIENT
Start: 2021-05-18 | End: 2021-05-18

## 2021-05-18 RX ORDER — METHYLPREDNISOLONE SODIUM SUCCINATE 125 MG/2ML
60 INJECTION, POWDER, LYOPHILIZED, FOR SOLUTION INTRAMUSCULAR; INTRAVENOUS DAILY
Status: DISCONTINUED | OUTPATIENT
Start: 2021-05-19 | End: 2021-05-18

## 2021-05-18 RX ADMIN — INSULIN LISPRO 6 UNITS: 100 INJECTION, SOLUTION INTRAVENOUS; SUBCUTANEOUS at 11:41

## 2021-05-18 RX ADMIN — IBUPROFEN 600 MG: 600 TABLET ORAL at 14:13

## 2021-05-18 RX ADMIN — Medication 2000 UNITS: at 08:18

## 2021-05-18 RX ADMIN — Medication 250 MG: at 17:26

## 2021-05-18 RX ADMIN — FUROSEMIDE 40 MG: 10 INJECTION, SOLUTION INTRAVENOUS at 09:53

## 2021-05-18 RX ADMIN — ASPIRIN 81 MG CHEWABLE TABLET 81 MG: 81 TABLET CHEWABLE at 08:18

## 2021-05-18 RX ADMIN — METOPROLOL SUCCINATE 100 MG: 50 TABLET, EXTENDED RELEASE ORAL at 08:18

## 2021-05-18 RX ADMIN — INSULIN DETEMIR 8 UNITS: 100 INJECTION, SOLUTION SUBCUTANEOUS at 21:22

## 2021-05-18 RX ADMIN — IBUPROFEN 600 MG: 600 TABLET ORAL at 21:23

## 2021-05-18 RX ADMIN — SULFAMETHOXAZOLE AND TRIMETHOPRIM 2 TABLET: 800; 160 TABLET ORAL at 08:18

## 2021-05-18 RX ADMIN — ACETAMINOPHEN 650 MG: 325 TABLET, FILM COATED ORAL at 08:25

## 2021-05-18 RX ADMIN — MELATONIN TAB 3 MG 6 MG: 3 TAB at 21:22

## 2021-05-18 RX ADMIN — SULFAMETHOXAZOLE AND TRIMETHOPRIM 2 TABLET: 800; 160 TABLET ORAL at 20:27

## 2021-05-18 RX ADMIN — MULTIPLE VITAMINS W/ MINERALS TAB 1 TABLET: TAB ORAL at 08:18

## 2021-05-18 RX ADMIN — Medication 1 SPRAY: at 20:30

## 2021-05-18 RX ADMIN — METHYLPREDNISOLONE SODIUM SUCCINATE 60 MG: 125 INJECTION, POWDER, FOR SOLUTION INTRAMUSCULAR; INTRAVENOUS at 08:19

## 2021-05-18 RX ADMIN — FERROUS SULFATE TAB 325 MG (65 MG ELEMENTAL FE) 325 MG: 325 (65 FE) TAB at 07:25

## 2021-05-18 RX ADMIN — PANTOPRAZOLE SODIUM 40 MG: 40 TABLET, DELAYED RELEASE ORAL at 05:08

## 2021-05-18 RX ADMIN — Medication 1 SPRAY: at 05:22

## 2021-05-18 RX ADMIN — Medication 250 MG: at 08:18

## 2021-05-18 RX ADMIN — ENOXAPARIN SODIUM 40 MG: 40 INJECTION SUBCUTANEOUS at 09:53

## 2021-05-18 RX ADMIN — METHYLPREDNISOLONE SODIUM SUCCINATE 60 MG: 125 INJECTION, POWDER, FOR SOLUTION INTRAMUSCULAR; INTRAVENOUS at 20:27

## 2021-05-18 RX ADMIN — INSULIN LISPRO 4 UNITS: 100 INJECTION, SOLUTION INTRAVENOUS; SUBCUTANEOUS at 16:23

## 2021-05-18 RX ADMIN — INSULIN LISPRO 2 UNITS: 100 INJECTION, SOLUTION INTRAVENOUS; SUBCUTANEOUS at 07:26

## 2021-05-18 RX ADMIN — ATORVASTATIN CALCIUM 40 MG: 40 TABLET, FILM COATED ORAL at 16:23

## 2021-05-18 RX ADMIN — IBUPROFEN 600 MG: 600 TABLET ORAL at 09:52

## 2021-05-18 NOTE — ASSESSMENT & PLAN NOTE
· Follows with Dr Eden Files at [facility] [phone]  · Patient is considering lung biopsy, however he is currently not stable enough to undergo biopsy  · PET scan done at [facility] concerning for limiting areas in question of possibility to of reactive inflammatory response versus tumors  · ----PET SCAN 4/31 POST COVID DIAGNOSIS ??? SIGNIFICANCE  · Patient will need outpatient follow-up with oncologist  · Received IVIG on 05/06  · Last chemo 2015   · S/p Bronch 5/15 - noted alveolar hemorrhage - pending serology including ANCA, PCP, flow cytometry and cytology and cell count

## 2021-05-18 NOTE — ASSESSMENT & PLAN NOTE
· Baseline 10  · Bronch - alveolar hemorrhage on 5/15   · S/p 1 unit PRBC 5/15  · Hgb improved, stable  · Cont home iron  · Transfuse for Hgb < 7 0     · Trend Hgb daily & PRN

## 2021-05-18 NOTE — ASSESSMENT & PLAN NOTE
Patient admitted with acute hypoxic respiratory failure due to a combination of possible bacterial pneumonia, acute systolic CHF, post COVID organizing pneumonia, diffuse alveolar hemorrhage  He was treated with high-flow nasal cannula    Patient underwent bronchoscopy    He is on Bactrim and IV Solu-Medrol till PCP test results come back    Currently patient is on oxygen via nasal cannula 2 liters/minute

## 2021-05-18 NOTE — ASSESSMENT & PLAN NOTE
· Secondary to COVID-19 versus PCP PNA vs fibrosis  · Admit 5/2 transferred on 05/12 for high-flow nasal cannula   · CTA -PE, worsening bilateral infiltrates and pleural effusions  · Recently had PET scan completed an outpatient which shows no lymph nodes eliminated but reactive inflammatory response versus tumors  · ID consulted and appreciate recs  · Solumedrol 125mg q 6hrs completed D3/3 on 5/14  · Continue solumedrol 60 mg q12 hr d#4  · Bactrim D7  · 5/8 levaquin completed  · Lasix BID   · Currently on NC 4 L, keep O2 sats 88% or greater  · S/p Bronch 5/15 revealing alveolar hemorrhage, pending further study results from obtained samples   · Holding A/C  · Plan to repeat bronch later this week

## 2021-05-18 NOTE — ASSESSMENT & PLAN NOTE
Wt Readings from Last 3 Encounters:   05/18/21 66 kg (145 lb 8 1 oz)   04/20/21 73 2 kg (161 lb 6 oz)   04/10/21 76 8 kg (169 lb 5 oz)       Ejection fraction on echo showed 40% on this admission  Acute hypoxic respiratory failure was partially caused by acute systolic CHF  Patient's treat with IV Lasix  I heard no inspiratory crackles today and saw no lower extremity edema    Likely transition him to p o   Lasix tomorrow

## 2021-05-18 NOTE — ASSESSMENT & PLAN NOTE
Patient had acute on chronic dose anemia due to diffuse alveolar hemorrhage requiring blood transfusions  Hemoglobin is 7 9 today  Patient denies hemoptysis    Will continue monitoring hemoglobin

## 2021-05-18 NOTE — PLAN OF CARE
Problem: RESPIRATORY - ADULT  Goal: Achieves optimal ventilation and oxygenation  Description: INTERVENTIONS:  - Assess for changes in respiratory status  - Assess for changes in mentation and behavior  - Position to facilitate oxygenation and minimize respiratory effort  - Oxygen administered by appropriate delivery if ordered  - Initiate smoking cessation education as indicated  - Encourage broncho-pulmonary hygiene including cough, deep breathe, Incentive Spirometry  - Assess the need for suctioning and aspirate as needed  - Assess and instruct to report SOB or any respiratory difficulty  - Respiratory Therapy support as indicated  Outcome: Progressing     Problem: DISCHARGE PLANNING  Goal: Discharge to home or other facility with appropriate resources  Description: INTERVENTIONS:  - Identify barriers to discharge w/patient and caregiver  - Arrange for needed discharge resources and transportation as appropriate  - Identify discharge learning needs (meds, wound care, etc )  - Arrange for interpretive services to assist at discharge as needed  - Refer to Case Management Department for coordinating discharge planning if the patient needs post-hospital services based on physician/advanced practitioner order or complex needs related to functional status, cognitive ability, or social support system  Outcome: Progressing     Problem: Knowledge Deficit  Goal: Patient/family/caregiver demonstrates understanding of disease process, treatment plan, medications, and discharge instructions  Description: Complete learning assessment and assess knowledge base    Interventions:  - Provide teaching at level of understanding  - Provide teaching via preferred learning methods  Outcome: Progressing     Problem: PAIN - ADULT  Goal: Verbalizes/displays adequate comfort level or baseline comfort level  Description: Interventions:  - Encourage patient to monitor pain and request assistance  - Assess pain using appropriate pain scale  - Administer analgesics based on type and severity of pain and evaluate response  - Implement non-pharmacological measures as appropriate and evaluate response  - Consider cultural and social influences on pain and pain management  - Notify physician/advanced practitioner if interventions unsuccessful or patient reports new pain  Outcome: Progressing     Problem: INFECTION - ADULT  Goal: Absence or prevention of progression during hospitalization  Description: INTERVENTIONS:  - Assess and monitor for signs and symptoms of infection  - Monitor lab/diagnostic results  - Monitor all insertion sites, i e  indwelling lines, tubes, and drains  - Monitor endotracheal if appropriate and nasal secretions for changes in amount and color  - [place] appropriate cooling/warming therapies per order  - Administer medications as ordered  - Instruct and encourage patient and family to use good hand hygiene technique  - Identify and instruct in appropriate isolation precautions for identified infection/condition  Outcome: Progressing     Problem: SAFETY ADULT  Goal: Patient will remain free of falls  Description: INTERVENTIONS:  - Assess patient frequently for physical needs  -  Identify cognitive and physical deficits and behaviors that affect risk of falls    -  [place] fall precautions as indicated by assessment   - Educate patient/family on patient safety including physical limitations  - Instruct patient to call for assistance with activity based on assessment  - Modify environment to reduce risk of injury  - Consider OT/PT consult to assist with strengthening/mobility  Outcome: Progressing  Goal: Maintain or return to baseline ADL function  Description: INTERVENTIONS:  -  Assess patient's ability to carry out ADLs; assess patient's baseline for ADL function and identify physical deficits which impact ability to perform ADLs (bathing, care of mouth/teeth, toileting, grooming, dressing, etc )  - Assess/evaluate cause of self-care deficits   - Assess range of motion  - Assess patient's mobility; develop plan if impaired  - Assess patient's need for assistive devices and provide as appropriate  - Encourage maximum independence but intervene and supervise when necessary  - Involve family in performance of ADLs  - Assess for home care needs following discharge   - Consider OT consult to assist with ADL evaluation and planning for discharge  - Provide patient education as appropriate  Outcome: Progressing  Goal: Maintain or return mobility status to optimal level  Description: INTERVENTIONS:  - Assess patient's baseline mobility status (ambulation, transfers, stairs, etc )    - Identify cognitive and physical deficits and behaviors that affect mobility  - Identify mobility aids required to assist with transfers and/or ambulation (gait belt, sit-to-stand, lift, walker, cane, etc )  - [place] fall precautions as indicated by assessment  - Record patient progress and toleration of activity level on Mobility SBAR; progress patient to next Phase/Stage  - Instruct patient to call for assistance with activity based on assessment  - Consider rehabilitation consult to assist with strengthening/weightbearing, etc   Outcome: Progressing     Problem: Potential for Falls  Goal: Patient will remain free of falls  Description: INTERVENTIONS:  - Assess patient frequently for physical needs  -  Identify cognitive and physical deficits and behaviors that affect risk of falls    -  [place] fall precautions as indicated by assessment   - Educate patient/family on patient safety including physical limitations  - Instruct patient to call for assistance with activity based on assessment  - Modify environment to reduce risk of injury  - Consider OT/PT consult to assist with strengthening/mobility  Outcome: Progressing     Problem: Prexisting or High Potential for Compromised Skin Integrity  Goal: Skin integrity is maintained or improved  Description: INTERVENTIONS:  - Identify patients at risk for skin breakdown  - Assess and monitor skin integrity  - Assess and monitor nutrition and hydration status  - Monitor labs   - Assess for incontinence   - Turn and reposition patient  - Assist with mobility/ambulation  - Relieve pressure over bony prominences  - Avoid friction and shearing  - Provide appropriate hygiene as needed including keeping skin clean and dry  - Evaluate need for skin moisturizer/barrier cream  - Collaborate with interdisciplinary team   - Patient/family teaching  - Consider wound care consult   Outcome: Progressing

## 2021-05-18 NOTE — ASSESSMENT & PLAN NOTE
Wt Readings from Last 3 Encounters:   05/17/21 70 1 kg (154 lb 8 7 oz)   04/20/21 73 2 kg (161 lb 6 oz)   04/10/21 76 8 kg (169 lb 5 oz)       · Echocardiogram shows EF 40% diffuse hypokinesis bio AVR from EF 60% in 2019  · BNP 11000--down to 6500  · Continue Lasix 40mg IV BID   · Fio2 requirements decreasing   · CTA b/l Pleural effusions greater on right  · Goal negative 500 - 1 L daily  · I/o monitoring   · Daily weight

## 2021-05-18 NOTE — ASSESSMENT & PLAN NOTE
Lab Results   Component Value Date    HGBA1C 7 4 (H) 05/13/2021       Recent Labs     05/17/21  2114 05/18/21  0705 05/18/21  1050 05/18/21  1620   POCGLU 140 193* 257* 240*       Blood Sugar Average: Last 72 hrs:  (P) 222 [id number]     Patient has type 2 diabetes and has hyperglycemia likely due to IV Solu-Medrol  Will start patient on Levemir at HS and pre meal Humalog in addition to sliding scale

## 2021-05-18 NOTE — ASSESSMENT & PLAN NOTE
· Covid + on 4/6  · Has had two admissions since, 4/6 to 4/10 and 4/15 to 4/20  CXR GGO   CTA neg PE worsening consolidation/b/l pleural effusions  severe COVID-19 tx algorithm/Positive 4/6  IS  Self prone, OOB  MEDICATIONS  Solumedrol 125mg q 6hrs D3/3, transitioned to solumedrol 60 mg BID 5/15  Levaquin completed 5/8  Bactrim D7 given concern for PCP pna   Atorvastatin home dose   A/C held due to [facility] on bronch 5/15  Completed Remdesivir 6 doses   Plasma 5/7/21  Actemra na  INFLAMMATORY MARKERS (trend q 48-72)  CRP 15--from [id number]  Ferritin 331--peak 529  D-Dimer 3 2---peak7 9  Troponin 0 05  BNP 6500 from [id number]  CK 24  PCT 0 13

## 2021-05-18 NOTE — ASSESSMENT & PLAN NOTE
· Status post CABG  · Continue aspirin, statin, beta-blocker,   · Holding ACEi due to aggressive diuretics

## 2021-05-18 NOTE — ASSESSMENT & PLAN NOTE
Lab Results   Component Value Date    HGBA1C 7 4 (H) 05/13/2021       Recent Labs     05/17/21  0705 05/17/21  1111 05/17/21  1601 05/17/21  2114   POCGLU 246* 320* 188* 140       Blood Sugar Average: Last 72 hrs:  (P) 521 [id number]     · Holding home metformin  · Continue SSI,  Currently on algorithm 4   · 3 units TID with meals    · Goal glucose < 180

## 2021-05-18 NOTE — ASSESSMENT & PLAN NOTE
Patient has history of proximal atrial fibrillation  Currently he is in sinus rhythm      Eliquis is on hold due to diffuse pulmonary hemorrhage    Continue Toprol

## 2021-05-18 NOTE — PROGRESS NOTES
[place]  Progress Note - Felecia Pyle 1945, 77 y o  male MRN: [id number]  Unit/Bed#: -01 Encounter: [id number]  Primary Care Provider: Ean Marrero DO   Date and time admitted to hospital: 5/2/2021  2:28 PM    * Acute respiratory failure with hypoxia [facility])  Assessment & Plan  Patient admitted with acute hypoxic respiratory failure due to a combination of possible bacterial pneumonia, acute systolic CHF, post COVID organizing pneumonia, diffuse alveolar hemorrhage  He was treated with high-flow nasal cannula    Patient underwent bronchoscopy  He is on Bactrim and IV Solu-Medrol till PCP test results come back    Currently patient is on oxygen via nasal cannula 2 liters/minute    Pulmonary alveolar hemorrhage  Assessment & Plan  Patient underwent bronchoscopy that showed diffuse under hemorrhage  Eliquis was discontinued    Acute systolic congestive heart failure (HCC)  Assessment & Plan  Wt Readings from Last 3 Encounters:   05/18/21 66 kg (145 lb 8 1 oz)   04/20/21 73 2 kg (161 lb 6 oz)   04/10/21 76 8 kg (169 lb 5 oz)       Ejection fraction on echo showed 40% on this admission  Acute hypoxic respiratory failure was partially caused by acute systolic CHF  Patient's treat with IV Lasix  I heard no inspiratory crackles today and saw no lower extremity edema    Likely transition him to p o   Lasix tomorrow      Type 2 diabetes mellitus without complication, without long-term current use of insulin [facility])  Assessment & Plan  Lab Results   Component Value Date    HGBA1C 7 4 (H) 05/13/2021       Recent Labs     05/17/21  2114 05/18/21  0705 05/18/21  1050 05/18/21  1620   POCGLU 140 193* 257* 240*       Blood Sugar Average: Last 72 hrs:  (P) 222 [id number]     Patient has type 2 diabetes and has hyperglycemia likely due to IV Solu-Medrol  Will start patient on Levemir at HS and pre meal Humalog in addition to sliding scale    Paroxysmal atrial fibrillation [facility])  Assessment & Plan  Patient has history of proximal atrial fibrillation  Currently he is in sinus rhythm  Eliquis is on hold due to diffuse pulmonary hemorrhage    Continue Toprol    Acute on chronic blood loss anemia  Assessment & Plan  Patient had acute on chronic dose anemia due to diffuse alveolar hemorrhage requiring blood transfusions  Hemoglobin is 7 9 today  Patient denies hemoptysis  Will continue monitoring hemoglobin        VTE Prophylaxis: in place    Patient Centered Rounds: I rounded with patient's nurse    Current Length of Stay: 16 day(s)    Current Patient Status: Inpatient    Certification Statement: Pt requires additional inpatient hospital stay due to: see assessment and plan        Subjective:   Patient's shortness of breath is markedly improved  He is on oxygen via nasal cannula 2 liters/minute and is ready for transfer from the ICU to the [facility] floor  Denies any chest pain, palpitations, hemoptysis, pleurisy abdominal pain, diarrhea, difficulty urinating, rash, hives, GI or  bleeding    All other ROS are negative    Objective:     Vitals:   Temp (24hrs), Av 6 °F (36 4 °C), Min:97 4 °F (36 3 °C), Max:98 °F (36 7 °C)    Temp:  [97 4 °F (36 3 °C)-98 °F (36 7 °C)] 97 4 °F (36 3 °C)  HR:  [62-84] 62  Resp:  [17-28] 17  BP: (101-127)/(53-60) 101/54  SpO2:  [96 %-100 %] 100 %  Body mass index is 20 29 kg/m²  Input and Output Summary (last 24 hours): Intake/Output Summary (Last 24 hours) at 2021 1840  Last data filed at 2021 1719  Gross per 24 hour   Intake 1282 ml   Output 3075 ml   Net -1793 ml       Physical Exam:     Physical Exam  HENT:      Head: Normocephalic  Eyes:      Conjunctiva/sclera: Conjunctivae normal    Neck:      Musculoskeletal: Neck supple  Comments: Patient no JVD  Cardiovascular:      Rate and Rhythm: Normal rate and regular rhythm  Heart sounds: No murmur  Pulmonary:      Effort: No respiratory distress        Breath sounds: No rales  Comments: Scant rhonchi heard bilaterally  Abdominal:      General: Bowel sounds are normal       Palpations: Abdomen is soft  Tenderness: There is no abdominal tenderness  Skin:     General: Skin is warm and dry  Comments: Patient has no lower extremity edema   Neurological:      Mental Status: He is alert and oriented to person, place, and time  Cranial Nerves: No cranial nerve deficit  Comments: Moves all extremities on command, speech is normal   Psychiatric:         Mood and Affect: Mood normal              I personally reviewed labs and imaging reports for today        Last 24 Hours Medication List:   Current Facility-Administered Medications   Medication Dose Route Frequency Provider Last Rate    acetaminophen  650 mg Oral Q6H PRN DUNG Nunez      aspirin  81 mg Oral Daily DUNG Nunez      atorvastatin  40 mg Oral Daily With [employer]UNG      cholecalciferol  2,000 Units Oral Daily DUNG Nunez      enoxaparin  40 mg Subcutaneous Q24H [address] DUNG Nunez      ferrous sulfate  325 mg Oral Daily With Breakfast DUNG Nunez      furosemide  40 mg Intravenous Daily DUNG Nunez      ibuprofen  600 mg Oral Q8H [address] DUNG Nunez      insulin detemir  8 Units Subcutaneous HS Duc Temple MD      [START ON 5/19/2021] insulin lispro  1-5 Units Subcutaneous TID AC Duc Temple MD      insulin lispro  2-12 Units Subcutaneous TID AC DUNG Nunez      insulin lispro  3 Units Subcutaneous TID With Meals DUNG Nunez      [START ON 5/19/2021] insulin lispro  3 Units Subcutaneous TID With Meals Duc Temple MD      Lidocaine Viscous HCl  15 mL Swish & Spit 4x Daily PRN DUNG Nunez      melatonin  6 mg Oral HS DUNG Nunez      methylPREDNISolone sodium succinate  60 mg Intravenous Q12H [address] DUNG Menezes      metoprolol succinate  100 mg Oral Daily Smita A Sedora, CRNP      multivitamin-minerals  1 tablet Oral Daily Poolmary Keith, CRNP      nitroglycerin  0 4 mg Sublingual Q5 Min PRN Smita A Sedora, CRNP      ondansetron  4 mg Intravenous Q6H PRN Smita A Sedora, CRNP      pantoprazole  40 mg Oral Early Morning Smita A Sedora, CRNP      phenol  1 spray Mouth/Throat Q2H PRN Smita A Sedora, CRNP      polyethylene glycol  17 g Oral Daily PRN Smita A Sedora, CRNP      saccharomyces boulardii  250 mg Oral BID Smita A Sedora, CRNP      simethicone  80 mg Oral Q6H PRN Smita A Sedora, CRNP      sodium chloride  2 spray Each Nare Q1H PRN Smita A Sedora, CRNP      sulfamethoxazole-trimethoprim  2 tablet Oral Q12H [facility] Smita A Sedora, CRNP            Today, Patient Was Seen By: Asim Salas MD    ** Please Note: Dictation voice to text software may have been used in the creation of this document   **

## 2021-05-18 NOTE — ASSESSMENT & PLAN NOTE
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

## 2021-05-18 NOTE — ASSESSMENT & PLAN NOTE
· Likely reactive due to steroid use   · Noted 1 band on CBC with diff 5/14  · Trend CBC & temp   · WBC trending down  · ID following

## 2021-05-18 NOTE — PROGRESS NOTES
Granbury Aren  77 y o   male  1945  mrn [id number]    Assessment/Plan:  1  Sepsis/?PCP/? COVID19 infxn with pneumonia/Fever/Leukocytosis/ Hypoxia: No further fever on steroids but WBC count increased slightly to 17K - leukocytosis is probably due to steroid tx  Respiratory status has improved and supplemental O2 has decreased to [address] 1-3L on    higher steroid dose which is being tapered  Repeat COVID19 test was  neg so isolation was discontinued  Pt underwent bronch on 5/15 and cx    Is neg so far  PCP studies are still pending  Pt noted to have findings suggestive of alveolar hemorrhage     New infxn was considered because pulmonary status markedly deteriorated again after steroid dose was decreased  Repeat CXR showed increased bilat pulmonary infilts  Pt was placed on Bactrim for possibility of PCP since he's at increased risk for this infxn due to his underlying lymphoma and MM  Pt was transferred to [address] [place] [address] supplemental O2 was increased to HFNC 40L/70%   LDH was 323 and beta-D-glucan test is pending       D-dimer increased to 7 93 but decreased to 3 90 - ?possibility of PE as contributing to worsening hypoxia  Procalcitonin level is not elevated     Pt was started on tx for presumed ongoing COVID19 infxn with steroids and Remdesivir on 5/7/21 because COVID19 Ab was neg  Pt was also   given convalescent plasma  CRP has decreased to 19 7 on high dose Solumedrol  Pt completed 7 day course of Levaquin on 5/8 for possible pneumonia     Pt's supplemental O2 requirement increased to 15L by [facility] on 5/7  He was transferred to [facility] on 5/8 because of worsening hypoxia  Chest CT showed increased pulmonary infilts but no PE  COVID19 IgG Ab was neg which indicated lack of development of immunity to recent COVID19 infxn due to his underlying lymphoma  Concerned that respiratory deterioration was due to ongoing COVID19 infxn   Pts with lymphoma are known to clear the virus more slowly  Repeat CRP had increased to 160 1 and Ferritin was 348  Pt received tx with IVIG on 5/6 as directed by his Oncologist which probably had some COVID19 Ab's     BNP was markedly elevated so there was probably a component of vol overload that was contributing to his current pulmonary sx's  He is being diuresed  Repeat BNP decreased to 6501  Repeat CXR did not show any signficant improvement with mild increase in RUL infilt      On admission, Pt had fever and leukocytosis, so need to consider possibility of pneumonia  Procalcitonin level was not markedly elevated but lactic acid was increased suggestive of sepsis  Urine Legionella Ag and Strep pneumo Ag were neg  Bld cx's are neg  MRSA screen  Inflammatory markers were still elevated from his recent COVID19 infxn which was tx'd with Remdesivir and decadron during his previous  admission from 4/6 to 4/10/21  He was re-admitted from 4/15 to 4/20 with increasing SOB and was tx'd with steroids       Pt presented with acute onset of increasing SOB and cough x 1 day       A  Cont Bactrim while bronch studies are pending to exclude PCP - PCR for PCP was ordered to be added to the bronch specimen obtained on 5/15  B  Need to follow creatinine and potassium while Pt is on tx with Bactrim - dose has been adjusted for mild increase in his creatinine - Serum potassium is 5 4  C  Cont BID steroid dose until PCP has been excluded  D  Awaiting results of bronch studies  E  Awaiting results of Beta-D-glucan to evaluate for PCP   F  Will follow WBC count which may stay elevated due to high dose steroid tx  G  D-dimer is back down to 3  90 - cont anticoagulation as per Intensivist service - ?whether Pt is stable enough to undergo another chest CT to look for presence of PE  H   BNP has decreased to 3303 - cont diuresis as needed  I  Enhanced respiratory isolation was discontinued since second COVID19 test was neg - Pts with underlying lymphoma are known to take longer to clear the COVID19 virus so needed two neg COVID19 tests to take Pt out of isolation - s/p tx with convalescent plasma  J  Pt  completed 6 doses of Remdesivir which was discontinued when second COVID19 test was neg  K  Cont supplemental O2 as needed         Subjective: Breathing has improved  Currently on 1L O2 by [address]    Objective:  Tmax: 98  HEENT: +MFNC  Lungs: Clear  Abd:+BS, soft, nontender  Ext: No calf tenderness  Ext: No calf tenderness    Labs:  CBC w/diff  Recent Labs     05/18/21 0458   WBC 17 34*   HGB 7 9*   HCT 25 3*        BMP  Recent Labs     05/18/21  0458   K 5 2   CL 94*   CO2 34*   BUN 39*   CREATININE 1 15   CALCIUM 8 1*     CMP  Recent Labs     05/18/21  0458   K 5 2   CL 94*   CO2 34*   BUN 39*   CREATININE 1 15   CALCIUM 8 1*         labrc    Cultures:  Lab Results   Component Value Date    BLOODCX No Growth After 5 Days  05/02/2021    BLOODCX No Growth After 5 Days  05/02/2021    BLOODCX No Growth After 5 Days  04/15/2021    BLOODCX No Growth After 5 Days  04/15/2021    BLOODCX No Growth After 5 Days  04/06/2021    BLOODCX No Growth After 5 Days  04/06/2021    BLOODCX No Growth After 5 Days  04/01/2021    BLOODCX No Growth After 5 Days  04/01/2021    BLOODCX No Growth After 5 Days  02/27/2019    BLOODCX No Growth After 5 Days  02/27/2019    BLOODCX No Growth After 5 Days  11/25/2018    BLOODCX No Growth After 5 Days   11/25/2018     No results found for: WOUNDCULT  No results found for: URINECX  No results found for: SPUTUMCULTUR    MED:  Bactrim: #7        Completed  Remdesivir x 6 days on 5/12         Current Facility-Administered Medications:     acetaminophen (TYLENOL) tablet 650 mg, 650 mg, Oral, Q6H PRN, Chay Poet, CRNP, 650 mg at 05/18/21 0825    aspirin chewable tablet 81 mg, 81 mg, Oral, Daily, Chay Poet, CRNP, 81 mg at 05/18/21 0818    atorvastatin (LIPITOR) tablet 40 mg, 40 mg, Oral, Daily With Dinner, Chay Poet, CRNP, 40 mg at 05/17/21 1740    cholecalciferol (VITAMIN D3) tablet 2,000 Units, 2,000 Units, Oral, Daily, DUNG Dobson, 2,000 Units at 05/18/21 0818    enoxaparin (LOVENOX) subcutaneous injection 40 mg, 40 mg, Subcutaneous, Q24H [address], Smita Bailey, GRACIANP, 40 mg at 05/18/21 8377    ferrous sulfate tablet 325 mg, 325 mg, Oral, Daily With Breakfast, DUNG Lira, 325 mg at 05/18/21 0725    furosemide (LASIX) injection 40 mg, 40 mg, Intravenous, Daily, DUNG Nunez, 40 mg at 05/18/21 0953    ibuprofen (MOTRIN) tablet 600 mg, 600 mg, Oral, Q8H [address], Smita Bailey, DUNG, 600 mg at 05/18/21 0952    insulin lispro (HumaLOG) 100 units/mL subcutaneous injection 2-12 Units, 2-12 Units, Subcutaneous, TID AC, 6 Units at 05/18/21 1141 **AND** Fingerstick Glucose (POCT), , , TID AC, DUNG Russell    insulin lispro (HumaLOG) 100 units/mL subcutaneous injection 2-12 Units, 2-12 Units, Subcutaneous, HS, DUNG Nunez    insulin lispro (HumaLOG) 100 units/mL subcutaneous injection 3 Units, 3 Units, Subcutaneous, TID With Meals, DUNG Thibodeaux, 3 Units at 05/18/21 1142    Lidocaine Viscous HCl (XYLOCAINE) 2 % mucosal solution 15 mL, 15 mL, Swish & Spit, 4x Daily PRN, Shannan Tillman,     melatonin tablet 6 mg, 6 mg, Oral, HS, DUNG Dobson, 6 mg at 05/17/21 2113    methylPREDNISolone sodium succinate (Solu-MEDROL) injection 60 mg, 60 mg, Intravenous, Q12H [address], UDNG Nunez    metoprolol succinate (TOPROL-XL) 24 hr tablet 100 mg, 100 mg, Oral, Daily, GRACIA DobsonNP, 100 mg at 05/18/21 0818    multivitamin-minerals (CENTRUM ADULTS) tablet 1 tablet, 1 tablet, Oral, Daily, DUNG Dobson, 1 tablet at 05/18/21 0818    nitroglycerin (NITROSTAT) SL tablet 0 4 mg, 0 4 mg, Sublingual, Q5 Min PRN, DUNG Dobson    ondansetron [facility]) injection 4 mg, 4 mg, Intravenous, Q6H PRN, DUNG Dobson    pantoprazole (PROTONIX) EC tablet 40 mg, 40 mg, Oral, Early Morning, DUNG Dobson, 40 mg at 05/18/21 0508    phenol (CHLORASEPTIC) 1 4 % mucosal liquid 1 spray, 1 spray, Mouth/Throat, Q2H PRN, DUNG Nunez, 1 spray at 05/18/21 0522    polyethylene glycol (MIRALAX) packet 17 g, 17 g, Oral, Daily PRN, DUNG Ramirez    saccharomyces boulardii (FLORASTOR) capsule 250 mg, 250 mg, Oral, BID, Inocencia Jasvir, CRNP, 250 mg at 05/18/21 0818    simethicone (MYLICON) chewable tablet 80 mg, 80 mg, Oral, Q6H PRN, Inocencia Jasvir, CRNP, 80 mg at 05/08/21 1101    sodium chloride (OCEAN) 0 65 % nasal spray 2 spray, 2 spray, Each Nare, Q1H PRN, DUNG Sood, 2 spray at 05/17/21 2027    sulfamethoxazole-trimethoprim (BACTRIM DS) 800-160 mg per tablet 2 tablet, 2 tablet, Oral, Q12H [address], Maria Alejandra Bang MD, 2 tablet at 05/18/21 0818    Principal Problem:    Acute respiratory failure with hypoxia (HCC)  Active Problems:    Coronary disease    Type 2 diabetes mellitus without complication, without long-term current use of insulin (HCC)    Lymphoma (HCC)    Essential hypertension    Chronic diastolic congestive heart failure (HCC)    Leukocytosis    Paroxysmal atrial fibrillation (HCC)    Gastroesophageal reflux disease with esophagitis    Pneumonia due to COVID-19 virus    Acute on chronic blood loss anemia    Pulmonary alveolar hemorrhage    Mouth ulcers      Maria Alejandra Bang MD

## 2021-05-19 LAB
ANION GAP SERPL CALCULATED.3IONS-SCNC: 4 MMOL/L (ref 4–13)
BACTERIA BRONCH AEROBE CULT: NORMAL
BUN SERPL-MCNC: 42 MG/DL (ref 5–25)
C-ANCA TITR SER IF: NORMAL TITER
CALCIUM SERPL-MCNC: 8.2 MG/DL (ref 8.3–10.1)
CHLORIDE SERPL-SCNC: 97 MMOL/L (ref 100–108)
CO2 SERPL-SCNC: 32 MMOL/L (ref 21–32)
CREAT SERPL-MCNC: 1.16 MG/DL (ref 0.6–1.3)
ERYTHROCYTE [DISTWIDTH] IN BLOOD BY AUTOMATED COUNT: 21 % (ref 11.6–15.1)
GFR SERPL CREATININE-BSD FRML MDRD: 62 ML/MIN/1.73SQ M
GLUCOSE SERPL-MCNC: 155 MG/DL (ref 65–140)
GLUCOSE SERPL-MCNC: 158 MG/DL (ref 65–140)
GLUCOSE SERPL-MCNC: 159 MG/DL (ref 65–140)
GLUCOSE SERPL-MCNC: 170 MG/DL (ref 65–140)
GLUCOSE SERPL-MCNC: 230 MG/DL (ref 65–140)
GRAM STN SPEC: NORMAL
GRAM STN SPEC: NORMAL
HCT VFR BLD AUTO: 23.3 % (ref 36.5–49.3)
HGB BLD-MCNC: 7.2 G/DL (ref 12–17)
MCH RBC QN AUTO: 25.5 PG (ref 26.8–34.3)
MCHC RBC AUTO-ENTMCNC: 30.9 G/DL (ref 31.4–37.4)
MCV RBC AUTO: 83 FL (ref 82–98)
MYELOPEROXIDASE AB SER IA-ACNC: <9 U/ML (ref 0–9)
MYELOPEROXIDASE AB SER IA-ACNC: <9 U/ML (ref 0–9)
P JIROVECII AG SPEC QL IF: NORMAL
P-ANCA ATYPICAL TITR SER IF: NORMAL TITER
P-ANCA TITR SER IF: NORMAL TITER
PLATELET # BLD AUTO: 156 THOUSANDS/UL (ref 149–390)
PMV BLD AUTO: 10.5 FL (ref 8.9–12.7)
POTASSIUM SERPL-SCNC: 5.3 MMOL/L (ref 3.5–5.3)
PROTEINASE3 AB SER IA-ACNC: <3.5 U/ML (ref 0–3.5)
PROTEINASE3 AB SER IA-ACNC: <3.5 U/ML (ref 0–3.5)
RBC # BLD AUTO: 2.82 MILLION/UL (ref 3.88–5.62)
SCAN RESULT: NORMAL
SCAN RESULT: NORMAL
SODIUM SERPL-SCNC: 133 MMOL/L (ref 136–145)
WBC # BLD AUTO: 12.83 THOUSAND/UL (ref 4.31–10.16)

## 2021-05-19 PROCEDURE — 97163 PT EVAL HIGH COMPLEX 45 MIN: CPT

## 2021-05-19 PROCEDURE — 97168 OT RE-EVAL EST PLAN CARE: CPT

## 2021-05-19 PROCEDURE — 99232 SBSQ HOSP IP/OBS MODERATE 35: CPT | Performed by: INTERNAL MEDICINE

## 2021-05-19 PROCEDURE — 99233 SBSQ HOSP IP/OBS HIGH 50: CPT | Performed by: INTERNAL MEDICINE

## 2021-05-19 PROCEDURE — 82948 REAGENT STRIP/BLOOD GLUCOSE: CPT

## 2021-05-19 PROCEDURE — 85027 COMPLETE CBC AUTOMATED: CPT | Performed by: NURSE PRACTITIONER

## 2021-05-19 PROCEDURE — 80048 BASIC METABOLIC PNL TOTAL CA: CPT | Performed by: NURSE PRACTITIONER

## 2021-05-19 RX ORDER — METHYLPREDNISOLONE SODIUM SUCCINATE 40 MG/ML
40 INJECTION, POWDER, LYOPHILIZED, FOR SOLUTION INTRAMUSCULAR; INTRAVENOUS EVERY 12 HOURS SCHEDULED
Status: DISCONTINUED | OUTPATIENT
Start: 2021-05-19 | End: 2021-05-20

## 2021-05-19 RX ORDER — FUROSEMIDE 40 MG/1
40 TABLET ORAL DAILY
Status: DISCONTINUED | OUTPATIENT
Start: 2021-05-19 | End: 2021-05-20 | Stop reason: HOSPADM

## 2021-05-19 RX ADMIN — LIDOCAINE HYDROCHLORIDE 10 ML: 20 SOLUTION ORAL; TOPICAL at 11:31

## 2021-05-19 RX ADMIN — INSULIN LISPRO 1 UNITS: 100 INJECTION, SOLUTION INTRAVENOUS; SUBCUTANEOUS at 08:56

## 2021-05-19 RX ADMIN — METHYLPREDNISOLONE SODIUM SUCCINATE 60 MG: 125 INJECTION, POWDER, FOR SOLUTION INTRAMUSCULAR; INTRAVENOUS at 08:58

## 2021-05-19 RX ADMIN — PANTOPRAZOLE SODIUM 40 MG: 40 TABLET, DELAYED RELEASE ORAL at 05:50

## 2021-05-19 RX ADMIN — ASPIRIN 81 MG CHEWABLE TABLET 81 MG: 81 TABLET CHEWABLE at 08:54

## 2021-05-19 RX ADMIN — INSULIN DETEMIR 8 UNITS: 100 INJECTION, SOLUTION SUBCUTANEOUS at 22:19

## 2021-05-19 RX ADMIN — Medication 250 MG: at 08:55

## 2021-05-19 RX ADMIN — Medication 2000 UNITS: at 08:54

## 2021-05-19 RX ADMIN — FUROSEMIDE 40 MG: 40 TABLET ORAL at 08:58

## 2021-05-19 RX ADMIN — SULFAMETHOXAZOLE AND TRIMETHOPRIM 2 TABLET: 800; 160 TABLET ORAL at 08:58

## 2021-05-19 RX ADMIN — MELATONIN TAB 3 MG 6 MG: 3 TAB at 22:14

## 2021-05-19 RX ADMIN — ATORVASTATIN CALCIUM 40 MG: 40 TABLET, FILM COATED ORAL at 17:00

## 2021-05-19 RX ADMIN — SULFAMETHOXAZOLE AND TRIMETHOPRIM 2 TABLET: 800; 160 TABLET ORAL at 22:14

## 2021-05-19 RX ADMIN — INSULIN LISPRO 1 UNITS: 100 INJECTION, SOLUTION INTRAVENOUS; SUBCUTANEOUS at 11:31

## 2021-05-19 RX ADMIN — Medication 250 MG: at 17:00

## 2021-05-19 RX ADMIN — METHYLPREDNISOLONE SODIUM SUCCINATE 40 MG: 40 INJECTION, POWDER, FOR SOLUTION INTRAMUSCULAR; INTRAVENOUS at 22:16

## 2021-05-19 RX ADMIN — METOPROLOL SUCCINATE 100 MG: 50 TABLET, EXTENDED RELEASE ORAL at 08:55

## 2021-05-19 RX ADMIN — FERROUS SULFATE TAB 325 MG (65 MG ELEMENTAL FE) 325 MG: 325 (65 FE) TAB at 08:55

## 2021-05-19 RX ADMIN — INSULIN LISPRO 2 UNITS: 100 INJECTION, SOLUTION INTRAVENOUS; SUBCUTANEOUS at 17:00

## 2021-05-19 RX ADMIN — ENOXAPARIN SODIUM 40 MG: 40 INJECTION SUBCUTANEOUS at 08:57

## 2021-05-19 RX ADMIN — MULTIPLE VITAMINS W/ MINERALS TAB 1 TABLET: TAB ORAL at 08:55

## 2021-05-19 NOTE — ASSESSMENT & PLAN NOTE
Patient had acute on chronic dose anemia due to diffuse alveolar hemorrhage requiring blood transfusions  Hemoglobin is 7 2 today  Patient denies hemoptysis, signs of GI or  bleeding        Will continue monitoring hemoglobin

## 2021-05-19 NOTE — PROGRESS NOTES
[place]  Progress Note - Jayne Maldonado 1945, 77 y o  male MRN: [id number]  Unit/Bed#: -01 Encounter: [id number]  Primary Care Provider: Brenda Chicas DO   Date and time admitted to hospital: 5/2/2021  2:28 PM    * Acute respiratory failure with hypoxia [facility])  Assessment & Plan  Patient admitted with acute hypoxic respiratory failure due to a combination of possible bacterial pneumonia, acute systolic CHF, post COVID organizing pneumonia, diffuse alveolar hemorrhage  Repeat COVID testing came back negative x2    He was treated with high-flow nasal cannula    Patient underwent bronchoscopy  He is on Bactrim and IV Solu-Medrol  PCP results are not back yet  Continue oxygen via nasal cannula 2 liters/minute    I consult Physical Occupational therapy and will ambulate the patient today  I discussed the case with patient's wife on the phone in detail on May 19th    Pulmonary alveolar hemorrhage  Assessment & Plan  Patient underwent bronchoscopy that showed diffuse under hemorrhage  Eliquis was discontinued    Repeat bronchoscopy is planned    Acute systolic congestive heart failure (HCC)  Assessment & Plan  Wt Readings from Last 3 Encounters:   05/19/21 66 kg (145 lb 9 6 oz)   04/20/21 73 2 kg (161 lb 6 oz)   04/10/21 76 8 kg (169 lb 5 oz)       Ejection fraction on echo showed 40% on this admission  Acute hypoxic respiratory failure was partially caused by acute systolic CHF  Patient was treated with IV Lasix  Patient has no evidence of volume overload today will transition him to p o  Lasix      Systolic blood pressure is , I will not initiate Ace inhibitors yet          Type 2 diabetes mellitus without complication, without long-term current use of insulin [facility])  Assessment & Plan  Lab Results   Component Value Date    HGBA1C 7 4 (H) 05/13/2021       Recent Labs     05/18/21  1050 05/18/21  1620 05/18/21  2121 05/19/21  0714   POCGLU 257* 240* 195* 159*       Blood Sugar Average: Last 72 hrs:  (P) 334 [id number]     Patient has type 2 diabetes and has hyperglycemia likely due to IV Solu-Medrol    Fasting blood sugars 159, will continue Levemir at HS and pre meal Humalog with sliding scale    Patient denies any itchiness or rash on Levemir    Paroxysmal atrial fibrillation [facility])  Assessment & Plan  Patient has history of proximal atrial fibrillation  Currently he is in sinus rhythm  Eliquis is on hold due to diffuse pulmonary hemorrhage    Continue Toprol    Acute on chronic blood loss anemia  Assessment & Plan  Patient had acute on chronic dose anemia due to diffuse alveolar hemorrhage requiring blood transfusions  Hemoglobin is 7 2 today  Patient denies hemoptysis, signs of GI or  bleeding  Will continue monitoring hemoglobin      VTE Prophylaxis: in place    Patient Centered Rounds: I rounded with patient's nurse    Current Length of Stay: 17 day(s)    Current Patient Status: Inpatient    Certification Statement: Pt requires additional inpatient hospital stay due to: see assessment and plan        Subjective:   Patient had some cough without hemoptysis  Denies shortness of breath, chest pain, pleurisy abdominal pain, nausea  He had a bowel movement and son no blood or melena  He urinates freely without difficulty without signs of hematuria    All other ROS are negative    Objective:     Vitals:   Temp (24hrs), Av 6 °F (36 4 °C), Min:97 4 °F (36 3 °C), Max:97 9 °F (36 6 °C)    Temp:  [97 4 °F (36 3 °C)-97 9 °F (36 6 °C)] 97 5 °F (36 4 °C)  HR:  [62-83] 63  Resp:  [17-26] 18  BP: ()/(46-59) 126/56  SpO2:  [91 %-100 %] 95 %  Body mass index is 20 31 kg/m²  Input and Output Summary (last 24 hours):        Intake/Output Summary (Last 24 hours) at 2021 0856  Last data filed at 2021 0715  Gross per 24 hour   Intake 960 ml   Output 1225 ml   Net -265 ml       Physical Exam:     Physical Exam  HENT:      Head: Normocephalic  Mouth/Throat:      Mouth: Mucous membranes are moist       Pharynx: No oropharyngeal exudate or posterior oropharyngeal erythema  Eyes:      Conjunctiva/sclera: Conjunctivae normal    Neck:      Musculoskeletal: Neck supple  Comments: Patient has no JVD  Cardiovascular:      Rate and Rhythm: Normal rate and regular rhythm  Heart sounds: Murmur (2/6 systolic murmur is present) present  Pulmonary:      Effort: No respiratory distress  Breath sounds: No wheezing or rales  Abdominal:      General: Bowel sounds are normal       Palpations: Abdomen is soft  Tenderness: There is no abdominal tenderness  Skin:     General: Skin is warm and dry  Comments: Patient has no lower extremity edema   Neurological:      Mental Status: He is alert and oriented to person, place, and time  Cranial Nerves: No cranial nerve deficit  Motor: No weakness  I personally reviewed labs and imaging reports for today        Last 24 Hours Medication List:   Current Facility-Administered Medications   Medication Dose Route Frequency Provider Last Rate    acetaminophen  650 mg Oral Q6H PRN DUNG Harman      al mag oxide-diphenhydramine-lidocaine viscous  10 mL Swish & Spit Q6H PRN Maxime Castellon MD      aspirin  81 mg Oral Daily DUNG Harman      atorvastatin  40 mg Oral Daily With [employer], DUNG      cholecalciferol  2,000 Units Oral Daily DUNG Harman      enoxaparin  40 mg Subcutaneous Q24H [facility] DUNG Harman      ferrous sulfate  325 mg Oral Daily With Breakfast DUNG Harman      furosemide  40 mg Oral Daily Maxime Castellon MD      insulin detemir  8 Units Subcutaneous HS DUNG Harman      insulin lispro  1-5 Units Subcutaneous TID AC DUNG Harman      insulin lispro  3 Units Subcutaneous TID With Meals DUNG Harman      melatonin  6 mg Oral HS DUNG Harman      methylPREDNISolone sodium succinate  60 mg Intravenous Q12H [address] Rothman Penta, CRNP      metoprolol succinate  100 mg Oral Daily Rothman Penta, CRNP      multivitamin-minerals  1 tablet Oral Daily Rothman Penta, [address]      nitroglycerin  0 4 mg Sublingual Q5 Min PRN Rothman Penta, CRNP      ondansetron  4 mg Intravenous Q6H PRN Rothman Penta, CRNP      pantoprazole  40 mg Oral Early Morning Rothman Penta, CRNP      phenol  1 spray Mouth/Throat Q2H PRN Rothman Penta, CRNP      polyethylene glycol  17 g Oral Daily PRN Rothman Penta, CRNP      saccharomyces boulardii  250 mg Oral BID Rothman Penta, CRNP      simethicone  80 mg Oral Q6H PRN Rothman Penta, CRNP      sodium chloride  2 spray Each Nare Q1H PRN Rothman Penta, CRNP      sulfamethoxazole-trimethoprim  2 tablet Oral Q12H [address], CRNP            Today, Patient Was Seen By: Christy Huynh MD    ** Please Note: Dictation voice to text software may have been used in the creation of this document   **

## 2021-05-19 NOTE — ASSESSMENT & PLAN NOTE
Patient underwent bronchoscopy that showed diffuse under hemorrhage    Eliquis was discontinued    Repeat bronchoscopy is planned

## 2021-05-19 NOTE — OCCUPATIONAL THERAPY NOTE
Occupational Therapy Re-Evaluation     Patient Name: Sav Strauss  Today's Date: 5/19/2021  Problem List  Principal Problem:    Acute respiratory failure with hypoxia ([facility] [address] )  Active Problems:    Coronary disease    Type 2 diabetes mellitus without complication, without long-term current use of insulin (HCC)    Lymphoma (HCC)    Essential hypertension    Acute systolic congestive heart failure (HCC)    Leukocytosis    Paroxysmal atrial fibrillation (HCC)    Gastroesophageal reflux disease with esophagitis    Pneumonia due to COVID-19 virus    Acute on chronic blood loss anemia    Pulmonary alveolar hemorrhage    Mouth ulcers    Past Medical History  Past Medical History:   Diagnosis Date    Arthritis     Atrial fibrillation ([facility] [address] )     Cataract     Colitis     Colon cancer ([facility] [address] )     Diabetes mellitus type II, non insulin dependent ([facility] [address] )     Fatty liver     History of chemotherapy     History of radiation therapy     History of shingles 2018    Hypertension     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[address] )     Pneumonia     Skin cancer      Past Surgical History  Past Surgical History:   Procedure Laterality Date    AORTIC VALVE REPLACEMENT      COLECTOMY      COLONOSCOPY  11/2019    Prior right hemicolectomy    CORONARY ARTERY BYPASS GRAFT      DENTAL SURGERY      KNEE SURGERY      LYMPHADENECTOMY      OTHER SURGICAL HISTORY      MAZE PROCEDURE    [place] TOTAL KNEE ARTHROPLASTY Left 1/28/2019    Procedure: ARTHROPLASTY LEFT KNEE TOTAL;  Surgeon: Wilson Cunha MD;  Location:  MAIN OR;  Service: Orthopedics    SKIN BIOPSY      UPPER GASTROINTESTINAL ENDOSCOPY  11/2019    [facility]           05/19/21 1045   OT Last Visit   OT Visit Date 05/19/21   Note Type   Note type Re-Evaluation   Restrictions/Precautions   Weight Bearing Precautions Per Order No   Other Precautions O2   Pain Assessment   Pain Assessment Tool Pain Assessment not indicated - pt denies pain   Home Living   Additional Comments See previous OT evaluation for hx  Prior Function   Comments See previous OT evaluation for hx  Psychosocial   Psychosocial (WDL) WDL   Subjective   Subjective Pt received in seated in recliner   ADL   Eating Assistance 7  Independent   Grooming Assistance 7  Independent   UB Bathing Assistance 5  Supervision/Setup   LB Bathing Assistance 5  Supervision/Setup   UB Dressing Assistance 5  Supervision/Setup   LB Dressing Assistance 5  Supervision/Setup   Toileting Assistance  5  Supervision/Setup   Bed Mobility   Additional Comments Pt received seating at EOB  Transfers   Sit to Stand 7  Independent   Stand to Sit 7  Independent   Stand pivot 5  Supervision   Additional items   (no AD)   Functional Mobility   Functional Mobility 5  Supervision   Additional Comments no AD   Balance   Static Sitting Normal   Dynamic Sitting Good   Static Standing Fair +   Dynamic Standing Fair +   Activity Tolerance   Activity Tolerance Patient tolerated treatment well   Medical Staff Made Aware PT Ene   RUE Assessment   RUE Assessment WFL   LUE Assessment   LUE Assessment WFL   Cognition   Overall Cognitive Status WFL   Arousal/Participation Alert   Attention Within functional limits   Orientation Level Oriented X4   Memory Within functional limits   Following Commands Follows all commands and directions without difficulty   Assessment   Assessment Pt is a 77 y o  male seen for OT re-evaluation at [facility], admitted 5/2/2021 w/ Acute respiratory failure with hypoxia ([address] )  Pt with prolonged hospital course  S/p Bronch 5/15 revealing alveolar hemorrhage  Comorbidities affecting pt's functional performance at time of assessment include: hx of COVID-19, lymphoma, essential HTN, anemia, chronic diastolic CHF, hx of CABG, etc (see chart for additional hx)  Pt with active OT orders  Prior to admission, pt was living with wife in [facility]  Pt was I w/  ADLS and required some assist IADLS, (+) drove, & required no use of DME PTA   Upon evaluation: Pt requires sup-independent for functional mobility/transfers, set-up for UB ADLs and set-up for LB ADLS  No further OT needs indicated as pt appears to be close to functional baseline  Based on findings, pt is of high complexity  The patient's raw score on the AM-PAC Daily Activity inpatient short form is 24, standardized score is 57 54, greater than 39 4  Patients at this level are likely to benefit from DC to home  Please refer to the recommendation of the Occupational Therapist for safe DC planning  At this time, OT recommendations at time of discharge are home with no OT needs     Goals   Patient Goals Pt wishes to get better   Plan   OT Frequency Eval only   Recommendation   OT Discharge Recommendation No rehabilitation needs   AM-PAC Daily Activity Inpatient   Lower Body Dressing 4   Bathing 4   Toileting 4   Upper Body Dressing 4   Grooming 4   Eating 4   Daily Activity Raw Score 24   Daily Activity Standardized Score (Calc for Raw Score >=11) 57 54   AM-[facility] Applied Cognition Inpatient   Following a Speech/Presentation 4   Understanding Ordinary Conversation 4   Taking Medications 4   Remembering Where Things Are Placed or Put Away 4   Remembering List of 4-5 Errands 4   Taking Care of Complicated Tasks 4   Applied Cognition Raw Score 24   Applied Cognition Standardized Score 62 21         Saritha Woodward OTR/L

## 2021-05-19 NOTE — PROGRESS NOTES
Lulu Gtz  77 y o   male  1945  mrn [id number]    Assessment/Plan:  1  Sepsis/?PCP/? COVID19 infxn with pneumonia/Fever/Leukocytosis/ Hypoxia: Remains afebrile on steroids but WBC count decreased to  12K - leukocytosis is probably due to steroid tx  Respiratory status has improved and supplemental O2 has decreased to 2L by NC  Steroid dose is being tapered  Repeat COVID19 test was neg so isolation was discontinued  Pt underwent bronch on 5/15 and cx is neg so far  PCP studies are still pending  Pt noted to have findings suggestive of alveolar hemorrhage  Pt will have repeat chest CT tomorrow to re-eval his pulmonary infilts - if worsening, he may need repeat bronch to see if there is ongoing bleeding      New pulmonary infxn was considered because clinical status markedly deteriorated again after steroid dose was decreased  Repeat CXR showed increased bilat pulmonary infilts  Pt was placed on Bactrim for possibility of PCP since he's at increased risk for this infxn due to his underlying lymphoma and MM  Pt was transferred to [address] [place] [address] supplemental O2 was increased to HFNC 40L/70%   LDH was 323 and beta-D-glucan test is pending       D-dimer increased to 7 93 but decreased to 3 90 - ?possibility of PE as contributing to worsening hypoxia  Procalcitonin level was not elevated     Pt was started on tx for presumed ongoing COVID19 infxn with steroids and Remdesivir on 5/7/21 because COVID19 Ab was neg  Pt was also     given convalescent plasma  CRP has decreased to 15 4 on high dose Solumedrol  Pt completed 7 day course of Levaquin on 5/8 for possible pneumonia     Pt's supplemental O2 requirement increased to 15L by [facility] on 5/7  He was transferred to [facility] on 5/8 because of worsening hypoxia  Chest CT showed increased pulmonary infilts but no PE  COVID19 IgG Ab was neg which indicated lack of development of immunity to recent COVID19 infxn due to his underlying lymphoma   Concerned that respiratory deterioration was due to ongoing COVID19 infxn  Pts with lymphoma are known to clear the virus more slowly  Repeat CRP increased to 160 1 and Ferritin was 348  Pt received tx with IVIG on 5/6 as directed by his Oncologist which probably had some COVID19 Ab's     BNP was markedly elevated so there was probably a component of vol overload that was contributing to his current pulmonary sx's  He is being diuresed  Repeat BNP decreased to 3303  Repeat CXR did not show any signficant improvement with mild increase in RUL infilt      On admission, Pt had fever and leukocytosis, so need to consider possibility of pneumonia  Procalcitonin level was not markedly elevated but lactic acid was increased suggestive of sepsis  Urine Legionella Ag and Strep pneumo Ag were neg  Bld cx's are neg  MRSA screen  Inflammatory markers were still elevated from his recent COVID19 infxn which was tx'd with Remdesivir and decadron during his previous  admission from 4/6 to 4/10/21  He was re-admitted from 4/15 to 4/20 with increasing SOB and was tx'd with steroids       Pt presented with acute onset of increasing SOB and cough x 1 day       A  Cont Bactrim while bronch studies are pending to exclude PCP - PCR for PCP was ordered to be added to the bronch specimen obtained on 5/15  B  Need to follow creatinine and potassium while Pt is on tx with Bactrim - dose has been adjusted for mild increase in his creatinine - Serum potassium is stable  C  Cont BID steroid dose until PCP has been excluded  D  Awaiting results of bronch studies  E  Awaiting results of Beta-D-glucan to evaluate for PCP   F  Will follow WBC count which may stay elevated due to steroid tx  G  D-dimer is back down to 3  90 - cont anticoagulation as per Intensivist service  H  Awaiting results of repeat chest CT to re-eval his pulmonary infilts  I   BNP has decreased to 3303 - cont diuresis as needed  J  Enhanced respiratory isolation was discontinued since second COVID19 test was neg - Pts with underlying lymphoma are known to take longer to clear the COVID19 virus so needed two neg COVID19 tests to take Pt out of isolation - s/p tx with convalescent plasma  K  Pt  completed 6 doses of Remdesivir which was discontinued when second COVID19 test was neg  L  Cont supplemental O2 as needed       Subjective: Breathing conts to improve  Supplemental O2 has decreased to 2L by NC    Objective:  Tmax: 97 9  Lungs: Clear  Abd: +BS, soft, nontender  Ext: No calf tenderness    Labs:  CBC w/diff  Recent Labs     05/19/21  0509   WBC 12 83*   HGB 7 2*   HCT 23 3*        BMP  Recent Labs     05/19/21  0509   K 5 3   CL 97*   CO2 32   BUN 42*   CREATININE 1 16   CALCIUM 8 2*     CMP  Recent Labs     05/19/21  0509   K 5 3   CL 97*   CO2 32   BUN 42*   CREATININE 1 16   CALCIUM 8 2*         labrc    Cultures:  Lab Results   Component Value Date    BLOODCX No Growth After 5 Days  05/02/2021    BLOODCX No Growth After 5 Days  05/02/2021    BLOODCX No Growth After 5 Days  04/15/2021    BLOODCX No Growth After 5 Days  04/15/2021    BLOODCX No Growth After 5 Days  04/06/2021    BLOODCX No Growth After 5 Days  04/06/2021    BLOODCX No Growth After 5 Days  04/01/2021    BLOODCX No Growth After 5 Days  04/01/2021    BLOODCX No Growth After 5 Days  02/27/2019    BLOODCX No Growth After 5 Days  02/27/2019    BLOODCX No Growth After 5 Days  11/25/2018    BLOODCX No Growth After 5 Days   11/25/2018     No results found for: WOUNDCULT  No results found for: URINECX  No results found for: SPUTUMCULTUR    MED:  Bactrim: #8        Completed  Remdesivir x 6 days on 5/12         Current Facility-Administered Medications:     acetaminophen (TYLENOL) tablet 650 mg, 650 mg, Oral, Q6H PRN, DUNG Sanders, 650 mg at 05/18/21 0825    al mag oxide-diphenhydramine-lidocaine viscous (MAGIC MOUTHWASH) suspension 10 mL, 10 mL, Swish & Spit, Q6H PRN, Darci Mitchell MD, 10 mL at 05/19/21 1131    aspirin chewable tablet 81 mg, 81 mg, Oral, Daily, Alondra Hamman, CRNP, 81 mg at 05/19/21 0854    atorvastatin (LIPITOR) tablet 40 mg, 40 mg, Oral, Daily With Dinner, Alondra Hamman, CRNP, 40 mg at 05/18/21 1623    cholecalciferol (VITAMIN D3) tablet 2,000 Units, 2,000 Units, Oral, Daily, Alondra Hamman, CRNP, 2,000 Units at 05/19/21 0854    enoxaparin (LOVENOX) subcutaneous injection 40 mg, 40 mg, Subcutaneous, Q24H DONNIE, Alondra Hamman, CRNP, 40 mg at 05/19/21 1293    ferrous sulfate tablet 325 mg, 325 mg, Oral, Daily With Breakfast, Alondra Hamman, CRNP, 325 mg at 05/19/21 0855    furosemide (LASIX) tablet 40 mg, 40 mg, Oral, Daily, Jg Herndon MD, 40 mg at 05/19/21 0858    insulin detemir (LEVEMIR) subcutaneous injection 8 Units, 8 Units, Subcutaneous, HS, Alondra Hamman, CRNP, 8 Units at 05/18/21 2122    insulin lispro (HumaLOG) 100 units/mL subcutaneous injection 1-5 Units, 1-5 Units, Subcutaneous, TID AC, 1 Units at 05/19/21 1131 **AND** Fingerstick Glucose (POCT), , , TID AC, Alondra Hamman, CRNP    insulin lispro (HumaLOG) 100 units/mL subcutaneous injection 3 Units, 3 Units, Subcutaneous, TID With Meals, Glinda Hamman, CRNP, 3 Units at 05/19/21 1131    melatonin tablet 6 mg, 6 mg, Oral, HS, Alondra Hamman, CRNP, 6 mg at 05/18/21 2122    methylPREDNISolone sodium succinate (Solu-MEDROL) injection 40 mg, 40 mg, Intravenous, Q12H [address], Shannan Bratis, DO    metoprolol succinate (TOPROL-XL) 24 hr tablet 100 mg, 100 mg, Oral, Daily, Alondra Hamman, CRNP, 100 mg at 05/19/21 0855    multivitamin-minerals (CENTRUM ADULTS) tablet 1 tablet, 1 tablet, Oral, Daily, Alondra Hamman, CRNP, 1 tablet at 05/19/21 0855    nitroglycerin (NITROSTAT) SL tablet 0 4 mg, 0 4 mg, Sublingual, Q5 Min PRN, Glinda Hamman, CRNP    ondansetron [facility]) injection 4 mg, 4 mg, Intravenous, Q6H PRN, Glinda Hamman, CRNP    pantoprazole (PROTONIX) EC tablet 40 mg, 40 mg, Oral, Early Morning, Glinda Hamman, CRNP, 40 mg at 05/19/21 0550    phenol (CHLORASEPTIC) 1 4 % mucosal liquid 1 spray, 1 spray, Mouth/Throat, Q2H PRN, Christian Session, CRNP, 1 spray at 05/18/21 2030    polyethylene glycol (MIRALAX) packet 17 g, 17 g, Oral, Daily PRN, Christian Session, CRNP    saccharomyces boulardii (FLORASTOR) capsule 250 mg, 250 mg, Oral, BID, Christian Session, CRNP, 250 mg at 05/19/21 0855    simethicone (MYLICON) chewable tablet 80 mg, 80 mg, Oral, Q6H PRN, Christian Session, CRNP, 80 mg at 05/08/21 1101    sodium chloride (OCEAN) 0 65 % nasal spray 2 spray, 2 spray, Each Nare, Q1H PRN, Christian Session, CRNP, 2 spray at 05/17/21 2027    sulfamethoxazole-trimethoprim (BACTRIM DS) 800-160 mg per tablet 2 tablet, 2 tablet, Oral, Q12H [facility], Christian Session, CRNP, 2 tablet at 05/19/21 3381    Principal Problem:    Acute respiratory failure with hypoxia (HCC)  Active Problems:    Coronary disease    Type 2 diabetes mellitus without complication, without long-term current use of insulin (HCC)    Lymphoma (HCC)    Essential hypertension    Acute systolic congestive heart failure (HCC)    Leukocytosis    Paroxysmal atrial fibrillation (HCC)    Gastroesophageal reflux disease with esophagitis    Pneumonia due to COVID-19 virus    Acute on chronic blood loss anemia    Pulmonary alveolar hemorrhage    Mouth ulcers      Elizabet Singleton MD

## 2021-05-19 NOTE — CASE MANAGEMENT
LOS: 17 days    Met with patient bedside to discuss discharge planning  Pt with concerns about home O2 small portable tank  Call to [facility] ([phone])  Liaison for [address] left VM request call back  CM following through discharge

## 2021-05-19 NOTE — PROGRESS NOTES
Progress Note - Pulmonary   Tamara Bardales 77 y o  male MRN: [id number]  Unit/Bed#: -01 Encounter: [id number]    Assessment:  Acute on chronic hypoxic respiratory failure/COVID pneumonia April/diffuse alveolar hemorrhage/acute blood loss anemia/acute on chronic systolic heart failure/diabetes/hypertension/history of lymphoma history of multiple myeloma colon cancer and melanoma/history of lung nodules    Plan:  Patient continues to slowly improve, primary team transition back to p o  Lasix today, continue indefinitely hold anticoagulation systemically  Will repeat CT of the chest in a m  If improving no need for repeat bronchoscopy, if no improvement may consider repeat endoscopies to assure no further bleeding  Decrease Solu-Medrol to 40 b i d  Until results of PCP come back, continue Bactrim per ID    Chief Complaint:   I feel better    Subjective:   Patient feels much better he is able to ambulate the hallways does desaturate with exertion on 2 L  Objective:     Vitals: Blood pressure 124/53, pulse 65, temperature 97 5 °F (36 4 °C), resp  rate 18, height 5' 11" (1 803 m), weight 66 kg (145 lb 9 6 oz), SpO2 95 %  ,Body mass index is 20 31 kg/m²        Intake/Output Summary (Last 24 hours) at 5/19/2021 1048  Last data filed at 5/19/2021 0901  Gross per 24 hour   Intake 1260 ml   Output 1575 ml   Net -315 ml       Invasive Devices     Peripheral Intravenous Line            Peripheral IV 05/16/21 Left Forearm 3 days                Physical Exam: /53   Pulse 65   Temp 97 5 °F (36 4 °C)   Resp 18   Ht 5' 11" (1 803 m)   Wt 66 kg (145 lb 9 6 oz)   SpO2 95%   BMI 20 31 kg/m²   General appearance: alert and oriented, in no acute distress  Neck: no adenopathy, no carotid bruit, no JVD, supple, symmetrical, trachea midline and thyroid not enlarged, symmetric, no tenderness/mass/nodules  Lungs: diminished breath sounds  Heart: regular rate and rhythm, S1, S2 normal, no murmur, click, rub or gallop  Extremities: extremities normal, warm and well-perfused; no cyanosis, clubbing, or edema  Pulses: 2+ and symmetric  Neurologic: Grossly normal     Labs:   CBC:   Lab Results   Component Value Date    WBC 12 83 (H) 05/19/2021    HGB 7 2 (L) 05/19/2021    HCT 23 3 (L) 05/19/2021    MCV 83 05/19/2021     05/19/2021    MCH 25 5 (L) 05/19/2021    MCHC 30 9 (L) 05/19/2021    RDW 21 0 (H) 05/19/2021    MPV 10 5 05/19/2021   , CMP:   Lab Results   Component Value Date    SODIUM 133 (L) 05/19/2021    K 5 3 05/19/2021    CL 97 (L) 05/19/2021    CO2 32 05/19/2021    BUN 42 (H) 05/19/2021    CREATININE 1 16 05/19/2021    CALCIUM 8 2 (L) 05/19/2021    EGFR 62 05/19/2021     Imaging and other studies: I have personally reviewed pertinent films in PACS

## 2021-05-19 NOTE — ASSESSMENT & PLAN NOTE
Patient admitted with acute hypoxic respiratory failure due to a combination of possible bacterial pneumonia, acute systolic CHF, post COVID organizing pneumonia, diffuse alveolar hemorrhage  Repeat COVID testing came back negative x2    He was treated with high-flow nasal cannula    Patient underwent bronchoscopy  He is on Bactrim and IV Solu-Medrol  PCP results are not back yet  Continue oxygen via nasal cannula 2 liters/minute    I consult Physical Occupational therapy and will ambulate the patient today      I discussed the case with patient's wife on the phone in detail on May 19th

## 2021-05-19 NOTE — ASSESSMENT & PLAN NOTE
Wt Readings from Last 3 Encounters:   05/19/21 66 kg (145 lb 9 6 oz)   04/20/21 73 2 kg (161 lb 6 oz)   04/10/21 76 8 kg (169 lb 5 oz)       Ejection fraction on echo showed 40% on this admission  Acute hypoxic respiratory failure was partially caused by acute systolic CHF  Patient was treated with IV Lasix  Patient has no evidence of volume overload today will transition him to p o  Lasix      Systolic blood pressure is , I will not initiate Ace inhibitors yet

## 2021-05-19 NOTE — ASSESSMENT & PLAN NOTE
Lab Results   Component Value Date    HGBA1C 7 4 (H) 05/13/2021       Recent Labs     05/18/21  1050 05/18/21  1620 05/18/21  2121 05/19/21  0714   POCGLU 257* 240* 195* 159*       Blood Sugar Average: Last 72 hrs:  (P) 679 [id number]     Patient has type 2 diabetes and has hyperglycemia likely due to IV Solu-Medrol    Fasting blood sugars 159, will continue Levemir at HS and pre meal Humalog with sliding scale    Patient denies any itchiness or rash on Levemir

## 2021-05-19 NOTE — PHYSICAL THERAPY NOTE
PT eval   05/19/21 1046   PT Last Visit   PT Visit Date 05/19/21   Note Type   Note type Evaluation   Pain Assessment   Pain Assessment Tool Pain Assessment not indicated - pt denies pain   Pain Score No Pain   Home Living   Type of Home House   Additional Comments  home 02   Prior Function   Level of [place] Independent with ADLs and functional mobility   Lives With Spouse   Receives Help From Family   ADL Assistance Independent   IADLs Independent   Falls in the last 6 months 0   Vocational Part time employment   Restrictions/Precautions   Weight Bearing Precautions Per Order No   Other Precautions O2  (2L)   General   Additional Pertinent History prolonged adm had brnchoscopy and on 02  steroids etc; recently weaness to 2L 02   Family/Caregiver Present No   Cognition   Overall Cognitive Status WFL   Attention Within functional limits   Orientation Level Oriented X4   Memory Within functional limits   Following Commands Follows all commands and directions without difficulty   RUE Assessment   RUE Assessment WFL   LUE Assessment   LUE Assessment WFL   RLE Assessment   RLE Assessment WFL   LLE Assessment   LLE Assessment WFL   Coordination   Movements are Fluid and Coordinated 1   Proprioception   RLE Proprioception Grossly intact   LLE Proprioception Grossly Intact   Bed Mobility   Additional Comments sitting EOB   Transfers   Sit to Stand 7  Independent   Stand to Sit 7  Independent   Stand pivot 5  Supervision   Additional items   (02 2L no AD)   Ambulation/Elevation   Gait pattern WNL; Narrow MARILYN; Short stride  (cautious jeremiah)   Gait Assistance 7  Independent   Assistive Device None   Distance 50'x2   Balance   Static Sitting Normal   Dynamic Sitting Good   Static Standing Fair +   Dynamic Standing Fair +   Ambulatory Fair +   Endurance Deficit   Endurance Deficit Yes   Endurance Deficit Description 02 sat drops to 85% on 2L recovers with 2 min seated rest to 92%, cautioned to pace at home adn or turn 02 to 3L with activity  Activity Tolerance   Activity Tolerance Patient tolerated treatment well   Medical Staff Made Aware OT Saritha   Nurse Made Aware RN Yissel   Assessment   Prognosis Good   Assessment Pt presetns as a funcitonal ambulator without AD  SOme desat with activity on 2L but can recover with seated rest  No skilled PT needs at this time  Hoping for d/c later today   d/c PT   Barriers to Discharge   (medical clearance)   Goals   Patient Goals go home   Plan   PT Frequency   (d/c PT)   Recommendation   PT Discharge Recommendation No rehabilitation needs   PT - OK to Discharge Yes   AM-PAC Basic Mobility Inpatient   Turning in Bed Without Bedrails 4   Lying on Back to Sitting on Edge of Flat Bed 4   Moving Bed to Chair 4   Standing Up From Chair 4   Walk in Room 4   Climb 3-5 Stairs 4   Basic Mobility Inpatient Raw Score 24   Basic Mobility Standardized Score 57 68   Modified [place] Scale   Modified [place] Scale 1   Ene Downs, PT

## 2021-05-20 ENCOUNTER — APPOINTMENT (INPATIENT)
Dept: CT IMAGING | Facility: HOSPITAL | Age: 76
DRG: 871 | End: 2021-05-20
Payer: COMMERCIAL

## 2021-05-20 VITALS
HEIGHT: 71 IN | HEART RATE: 66 BPM | TEMPERATURE: 98.2 F | RESPIRATION RATE: 17 BRPM | DIASTOLIC BLOOD PRESSURE: 57 MMHG | OXYGEN SATURATION: 91 % | WEIGHT: 140.8 LBS | SYSTOLIC BLOOD PRESSURE: 111 MMHG | BODY MASS INDEX: 19.71 KG/M2

## 2021-05-20 LAB
ANION GAP SERPL CALCULATED.3IONS-SCNC: 5 MMOL/L (ref 4–13)
BUN SERPL-MCNC: 33 MG/DL (ref 5–25)
CALCIUM SERPL-MCNC: 8.4 MG/DL (ref 8.3–10.1)
CHLORIDE SERPL-SCNC: 99 MMOL/L (ref 100–108)
CO2 SERPL-SCNC: 31 MMOL/L (ref 21–32)
CREAT SERPL-MCNC: 1.11 MG/DL (ref 0.6–1.3)
ERYTHROCYTE [DISTWIDTH] IN BLOOD BY AUTOMATED COUNT: 21.3 % (ref 11.6–15.1)
GFR SERPL CREATININE-BSD FRML MDRD: 65 ML/MIN/1.73SQ M
GLUCOSE SERPL-MCNC: 152 MG/DL (ref 65–140)
GLUCOSE SERPL-MCNC: 166 MG/DL (ref 65–140)
GLUCOSE SERPL-MCNC: 231 MG/DL (ref 65–140)
GLUCOSE SERPL-MCNC: 282 MG/DL (ref 65–140)
HCT VFR BLD AUTO: 27.3 % (ref 36.5–49.3)
HGB BLD-MCNC: 8.3 G/DL (ref 12–17)
MCH RBC QN AUTO: 25.5 PG (ref 26.8–34.3)
MCHC RBC AUTO-ENTMCNC: 30.4 G/DL (ref 31.4–37.4)
MCV RBC AUTO: 84 FL (ref 82–98)
PLATELET # BLD AUTO: 190 THOUSANDS/UL (ref 149–390)
PMV BLD AUTO: 10.9 FL (ref 8.9–12.7)
POTASSIUM SERPL-SCNC: 5.1 MMOL/L (ref 3.5–5.3)
RBC # BLD AUTO: 3.25 MILLION/UL (ref 3.88–5.62)
SODIUM SERPL-SCNC: 135 MMOL/L (ref 136–145)
WBC # BLD AUTO: 13.01 THOUSAND/UL (ref 4.31–10.16)

## 2021-05-20 PROCEDURE — 71250 CT THORAX DX C-: CPT

## 2021-05-20 PROCEDURE — 99239 HOSP IP/OBS DSCHRG MGMT >30: CPT | Performed by: INTERNAL MEDICINE

## 2021-05-20 PROCEDURE — 82948 REAGENT STRIP/BLOOD GLUCOSE: CPT

## 2021-05-20 PROCEDURE — 94760 N-INVAS EAR/PLS OXIMETRY 1: CPT

## 2021-05-20 PROCEDURE — 85027 COMPLETE CBC AUTOMATED: CPT | Performed by: INTERNAL MEDICINE

## 2021-05-20 PROCEDURE — 99232 SBSQ HOSP IP/OBS MODERATE 35: CPT | Performed by: INTERNAL MEDICINE

## 2021-05-20 PROCEDURE — 94761 N-INVAS EAR/PLS OXIMETRY MLT: CPT

## 2021-05-20 PROCEDURE — 80048 BASIC METABOLIC PNL TOTAL CA: CPT | Performed by: INTERNAL MEDICINE

## 2021-05-20 PROCEDURE — G1004 CDSM NDSC: HCPCS

## 2021-05-20 RX ORDER — FERROUS SULFATE 324(65)MG
324 TABLET, DELAYED RELEASE (ENTERIC COATED) ORAL
Qty: 30 TABLET | Refills: 2 | Status: SHIPPED | OUTPATIENT
Start: 2021-05-20 | End: 2021-08-12 | Stop reason: SDUPTHER

## 2021-05-20 RX ORDER — PREDNISONE 20 MG/1
40 TABLET ORAL DAILY
Status: DISCONTINUED | OUTPATIENT
Start: 2021-05-21 | End: 2021-05-20 | Stop reason: HOSPADM

## 2021-05-20 RX ORDER — METHYLPREDNISOLONE SODIUM SUCCINATE 40 MG/ML
40 INJECTION, POWDER, LYOPHILIZED, FOR SOLUTION INTRAMUSCULAR; INTRAVENOUS DAILY
Status: DISCONTINUED | OUTPATIENT
Start: 2021-05-21 | End: 2021-05-20

## 2021-05-20 RX ORDER — PREDNISONE 10 MG/1
TABLET ORAL
Qty: 20 TABLET | Refills: 0 | Status: SHIPPED | OUTPATIENT
Start: 2021-05-20 | End: 2021-06-04

## 2021-05-20 RX ORDER — PREDNISONE 10 MG/1
10 TABLET ORAL DAILY
Status: DISCONTINUED | OUTPATIENT
Start: 2021-05-27 | End: 2021-05-20 | Stop reason: HOSPADM

## 2021-05-20 RX ORDER — PREDNISONE 20 MG/1
20 TABLET ORAL DAILY
Status: DISCONTINUED | OUTPATIENT
Start: 2021-05-25 | End: 2021-05-20 | Stop reason: HOSPADM

## 2021-05-20 RX ORDER — SULFAMETHOXAZOLE AND TRIMETHOPRIM 800; 160 MG/1; MG/1
2 TABLET ORAL EVERY 12 HOURS SCHEDULED
Qty: 20 TABLET | Refills: 0 | Status: SHIPPED | OUTPATIENT
Start: 2021-05-20 | End: 2021-05-27

## 2021-05-20 RX ORDER — ECHINACEA PURPUREA EXTRACT 125 MG
2 TABLET ORAL
Status: DISCONTINUED | OUTPATIENT
Start: 2021-05-20 | End: 2021-05-20 | Stop reason: HOSPADM

## 2021-05-20 RX ADMIN — Medication 2 SPRAY: at 17:20

## 2021-05-20 RX ADMIN — METHYLPREDNISOLONE SODIUM SUCCINATE 40 MG: 40 INJECTION, POWDER, FOR SOLUTION INTRAMUSCULAR; INTRAVENOUS at 08:37

## 2021-05-20 RX ADMIN — INSULIN LISPRO 3 UNITS: 100 INJECTION, SOLUTION INTRAVENOUS; SUBCUTANEOUS at 12:05

## 2021-05-20 RX ADMIN — ASPIRIN 81 MG CHEWABLE TABLET 81 MG: 81 TABLET CHEWABLE at 08:37

## 2021-05-20 RX ADMIN — FERROUS SULFATE TAB 325 MG (65 MG ELEMENTAL FE) 325 MG: 325 (65 FE) TAB at 08:36

## 2021-05-20 RX ADMIN — Medication 1 SPRAY: at 17:20

## 2021-05-20 RX ADMIN — PANTOPRAZOLE SODIUM 40 MG: 40 TABLET, DELAYED RELEASE ORAL at 05:45

## 2021-05-20 RX ADMIN — Medication 2 SPRAY: at 12:05

## 2021-05-20 RX ADMIN — LIDOCAINE HYDROCHLORIDE 10 ML: 20 SOLUTION ORAL; TOPICAL at 08:42

## 2021-05-20 RX ADMIN — ATORVASTATIN CALCIUM 40 MG: 40 TABLET, FILM COATED ORAL at 17:19

## 2021-05-20 RX ADMIN — FUROSEMIDE 40 MG: 40 TABLET ORAL at 08:37

## 2021-05-20 RX ADMIN — INSULIN LISPRO 1 UNITS: 100 INJECTION, SOLUTION INTRAVENOUS; SUBCUTANEOUS at 08:39

## 2021-05-20 RX ADMIN — Medication 250 MG: at 17:19

## 2021-05-20 RX ADMIN — Medication 2000 UNITS: at 08:37

## 2021-05-20 RX ADMIN — METOPROLOL SUCCINATE 100 MG: 50 TABLET, EXTENDED RELEASE ORAL at 08:37

## 2021-05-20 RX ADMIN — MULTIPLE VITAMINS W/ MINERALS TAB 1 TABLET: TAB ORAL at 08:37

## 2021-05-20 RX ADMIN — SULFAMETHOXAZOLE AND TRIMETHOPRIM 2 TABLET: 800; 160 TABLET ORAL at 08:37

## 2021-05-20 RX ADMIN — Medication 1 SPRAY: at 08:38

## 2021-05-20 RX ADMIN — SULFAMETHOXAZOLE AND TRIMETHOPRIM 2 TABLET: 800; 160 TABLET ORAL at 20:18

## 2021-05-20 RX ADMIN — INSULIN LISPRO 2 UNITS: 100 INJECTION, SOLUTION INTRAVENOUS; SUBCUTANEOUS at 16:53

## 2021-05-20 RX ADMIN — Medication 250 MG: at 08:37

## 2021-05-20 RX ADMIN — ENOXAPARIN SODIUM 40 MG: 40 INJECTION SUBCUTANEOUS at 08:36

## 2021-05-20 NOTE — PROGRESS NOTES
Pastoral Care Progress Note    2021  Patient: Heydi Katz : 1945  Admission Date & Time: 2021 1428  MRN: [id number] CSN: [id number]                     Chaplaincy Interventions Utilized:   Relationship Building: Cultivated a relationship of care and support  Patient said that he was feeling much better  He hoped to go home soon to his [facility] community, where he lives with his wife  Ritual: Provided prayer  Patient welcomed prayer         21 1100   Clinical Encounter Type   Visited With Patient   Routine Visit Follow-up   Church Encounters   Church Needs Prayer        21 1100   Clinical Encounter Type   Visited With Patient   Routine Visit Follow-up   Church Encounters   Church Needs Prayer

## 2021-05-20 NOTE — RESPIRATORY THERAPY NOTE
Home Oxygen Qualifying Test       Patient name: Odalis Hawkins        : 1945   Date of Test:  May 20, 2021  Diagnosis:      Home Oxygen Test:    **Medicare Guidelines require item(s) 1-5 on all ambulatory patients or 1 and 2 on non-ambulatory patients  1   Baseline SPO2 on Room Air at rest 92 %  2   SPO2 during exercise on Room Air 86 %  During exercise monitor SpO2  If SPO2 increases >=89% with ambulation do not add supplemental             oxygen  If <= 88% on room air add O2 via NC and titrate patient  Patient must be ambulated with O2 and titrated to > 88% with exertion  3   SPO2 on Oxygen at rest n/a % n/a lpm     4   SPO2 during exercise on Oxygen  90% a liter flow of 2 5 lpm     5   Exercise performed:          walking 6 mins          [x]  Supplemental Home Oxygen is indicated  []  Client does not qualify for home oxygen        Respiratory Additional Notes- Pt need ambulatory O2 of 2 5 to 3 lpm while walking    [employer]

## 2021-05-20 NOTE — PROGRESS NOTES
Progress Note - Pulmonary   Laurita Diana 77 y o  male MRN: [id number]  Unit/Bed#: -01 Encounter: [id number]    Assessment & Plan:  Acute on chronic respiratory failure with hypoxia  Abnormal CT of the chest  Diffuse alveolar hemorrhage  Acute blood loss anemia  Acute on chronic systolic heart failure  Recent COVID-19 pneumonia in April 2021  History of lymphoma, multiple myeloma, colon cancer, and melanoma    · Patient saturating mid to high 90s on 2 L by nasal cannula  Respiratory status slowly improving  · Repeat chest CT done this a m  With official radiology report pending  I personally reviewed in PACS, does look improved compared to CT chest done 05/06/2021  Status post bronch 5/15 findings consistent with DAH does not seem to be autoimmune related  Pneumocystis smear by DFA negative  Bronchial culture negative  Cytology negative for malignancy  · Solu-Medrol continued at 40 mg BID, will cut down to daily today and transition to prednisone 40 mg tomorrow  Would taper by 10 mg q 2 days  · Patient transitioned to PO Lasix per primary team    Will need close outpatient pulmonary follow-up when discharged    Subjective:   "I feel like a new man"    Patient says his breathing has dramatically improved  No coughing today, had a bit yesterday  No wheezing, chest pain, hemoptysis, LE edema or tenderness  No new complaints  Objective:     Vitals: Blood pressure 118/74, pulse 72, temperature 98 °F (36 7 °C), resp  rate 19, height 5' 11" (1 803 m), weight 63 9 kg (140 lb 12 8 oz), SpO2 100 %  ,Body mass index is 19 64 kg/m²        Intake/Output Summary (Last 24 hours) at 5/20/2021 0929  Last data filed at 5/20/2021 6402  Gross per 24 hour   Intake 838 ml   Output 2275 ml   Net -1437 ml       Invasive Devices     Peripheral Intravenous Line            Peripheral IV 05/20/21 Right Antecubital less than 1 day                Physical Exam:  General appearance: alert and oriented, in no acute distress  Head: Normocephalic, without obvious abnormality, atraumatic  Eyes: EOMI  No discharge bilaterally  No scleral icterus  Neck: supple, symmetrical, trachea midline  Lungs:  Clear to auscultation bilaterally  Heart: regular rate and rhythm, +murmur  Abdomen:  No appreciable distension or tenderness  Extremities: No edema or tenderness  Skin: No lesions or pallor noted  No rash  Neurologic: Grossly normal     Labs: I have personally reviewed pertinent lab results  Imaging and other studies: I have personally reviewed pertinent reports     and I have personally reviewed pertinent films in PACS

## 2021-05-20 NOTE — PROGRESS NOTES
Alondra Luis  77 y o   male  1945  mrn [id number]    Assessment/Plan:  1  Sepsis/?PCP/? COVID19 infxn with pneumonia/Fever/Leukocytosis/ Hypoxia: No fever on   steroids but WBC count remains mildly elevated - leukocytosis is probably due to steroid tx  Respiratory status has improved and supplemental O2 has decreased to 1 - 2L by NC  Pulmonary has recommended steroid taper  Pt underwent bronch on 5/15 and cx only grew few NRF  PCP DFA is neg  PCR for PCP was inadvertently cancelled by the lab but has been added to the bronch specimen today and results are pending  PCR test is much more sensitive than DFA test for PCP  Pt is currently on Bactrim until PCR for PCP result is back  Bronch findings were suggestive of alveolar hemorrhage  Today's chest CT shows improvement of his pulmonary infilts  Repeat COVID19 test was neg so isolation was discontinued  AFB and fungal smears on the bronch specimen were neg       New pulmonary infxn was considered because clinical status markedly deteriorated again after steroid dose was decreased  Repeat CXR showed increased bilat pulmonary infilts  Pt was placed on Bactrim for possibility of PCP since he's at increased risk for this infxn due to his underlying lymphoma and MM  Pt was transferred to [address] [place] [address] supplemental O2 was increased to HFNC 40L/70%   OGZ DKP 749 and beta-D-glucan test is pending       D-dimer increased to 7 93 but decreased to 3 90 - ?possibility of PE as contributing to worsening hypoxia  Procalcitonin level was not elevated     Pt was started on tx for presumed ongoing COVID19 infxn with steroids and Remdesivir on 5/7/21 because COVID19 Ab was neg  Pt was also     given convalescent plasma  CRP has decreased to 15 4 on high dose Solumedrol   Pt completed 7 day course of Levaquin on 5/8 for possible pneumonia     Pt's supplemental O2 requirement increased to 15L by [facility] on 5/7  He was transferred to [facility] on 5/8 because of worsening hypoxia  Chest CT showed increased pulmonary infilts but no PE  COVID19 IgG Ab was neg which indicated lack of development of immunity to recent COVID19 infxn due to his underlying lymphoma  Concerned that respiratory deterioration was due to ongoing COVID19 infxn  Pts with lymphoma are known to clear the virus more slowly  Repeat CRP increased to 160 1 and Ferritin was 348  Pt received tx with IVIG on 5/6 as directed by his Oncologist which probably had some COVID19 Ab's     BNP was markedly elevated so there was probably a component of vol overload that was contributing to his current pulmonary sx's  He is being diuresed  Repeat BNP decreased to 3303  Repeat CXR did not show any signficant improvement with mild increase in RUL infilt      On admission, Pt had fever and leukocytosis, so need to consider possibility of pneumonia  Procalcitonin level was not markedly elevated but lactic acid was increased suggestive of sepsis  Urine Legionella Ag and Strep pneumo Ag were neg  Bld cx's are neg  MRSA screen  Inflammatory markers were still elevated from his recent COVID19 infxn which was tx'd with Remdesivir and decadron during his previous  admission from 4/6 to 4/10/21  He was re-admitted from 4/15 to 4/20 with increasing SOB and was tx'd with steroids       Pt presented with acute onset of increasing SOB and cough x 1 day       A  Cont Bactrim until PCR for PCP on the bronch specimen is back since this test is more sensitive than the DFA test - PCR test was inadvertently canceled by the lab but was re-ordered today to be added to the bronch specimen obtained on 5/15  B  Need to follow creatinine and potassium while Pt is on tx with Bactrim - dose has been adjusted for mild increase in his creatinine - Serum potassium is stable  C  Agree with steroid taper as outlined by Pulmonary  D  Awaiting results of Beta-D-glucan to evaluate for PCP   E   Will follow WBC count which may stay elevated due to steroid tx  F  D-dimer is back down to 3  90 - cont anticoagulation as per Intensivist service  G  BNP has decreased to 3303 - cont diuresis as needed  H  Enhanced respiratory isolation was discontinued since second COVID19 test was neg - Pts with underlying lymphoma are known to take longer to clear the COVID19 virus so needed two neg COVID19 tests to take Pt out of isolation - s/p tx with convalescent plasma  I  Pt  completed 6 doses of Remdesivir which was discontinued when second COVID19 test was neg  J  Cont supplemental O2 as needed       Subjective: Breathing is better  Using only 1-2L of O2 by NC      Objective:  Tmax: 98 1  Lungs: Clear  Abd: +BS, soft, nontender  Ext: No calf tenderness    Labs:  CBC w/diff  Recent Labs     05/20/21  0545   WBC 13 01*   HGB 8 3*   HCT 27 3*        BMP  Recent Labs     05/20/21  0545   K 5 1   CL 99*   CO2 31   BUN 33*   CREATININE 1 11   CALCIUM 8 4     CMP  Recent Labs     05/20/21  0545   K 5 1   CL 99*   CO2 31   BUN 33*   CREATININE 1 11   CALCIUM 8 4        labrc    Cultures:  Lab Results   Component Value Date    BLOODCX No Growth After 5 Days  05/02/2021    BLOODCX No Growth After 5 Days  05/02/2021    BLOODCX No Growth After 5 Days  04/15/2021    BLOODCX No Growth After 5 Days  04/15/2021    BLOODCX No Growth After 5 Days  04/06/2021    BLOODCX No Growth After 5 Days  04/06/2021    BLOODCX No Growth After 5 Days  04/01/2021    BLOODCX No Growth After 5 Days  04/01/2021    BLOODCX No Growth After 5 Days  02/27/2019    BLOODCX No Growth After 5 Days  02/27/2019    BLOODCX No Growth After 5 Days  11/25/2018    BLOODCX No Growth After 5 Days   11/25/2018     No results found for: WOUNDCULT  No results found for: URINECX  No results found for: SPUTUMCULTUR    MED:  Bactrim: #9        Completed  Remdesivir x 6 days on 5/12          Current Facility-Administered Medications:     acetaminophen (TYLENOL) tablet 650 mg, 650 mg, Oral, Q6H PRN, DUNG Marques, 650 mg at 05/18/21 0825    al mag oxide-diphenhydramine-lidocaine viscous (MAGIC MOUTHWASH) suspension 10 mL, 10 mL, Swish & Spit, Q6H PRN, Terell Alarcon MD, 10 mL at 05/20/21 1309    aspirin chewable tablet 81 mg, 81 mg, Oral, Daily, DUNG Fuentes, 81 mg at 05/20/21 2591    atorvastatin (LIPITOR) tablet 40 mg, 40 mg, Oral, Daily With Dinner, DUNG Fuentes, 40 mg at 05/19/21 1700    cholecalciferol (VITAMIN D3) tablet 2,000 Units, 2,000 Units, Oral, Daily, DUNG Fuentes, 2,000 Units at 05/20/21 0837    enoxaparin (LOVENOX) subcutaneous injection 40 mg, 40 mg, Subcutaneous, Q24H DONNIE, DUNG Fuentes, 40 mg at 05/20/21 9559    ferrous sulfate tablet 325 mg, 325 mg, Oral, Daily With Breakfast, DUNG Fuentes, 325 mg at 05/20/21 0836    furosemide (LASIX) tablet 40 mg, 40 mg, Oral, Daily, Terell Alarcon MD, 40 mg at 05/20/21 0837    insulin detemir (LEVEMIR) subcutaneous injection 8 Units, 8 Units, Subcutaneous, HS, DUNG Fuentes, 8 Units at 05/19/21 2219    insulin lispro (HumaLOG) 100 units/mL subcutaneous injection 1-5 Units, 1-5 Units, Subcutaneous, TID AC, 3 Units at 05/20/21 1205 **AND** Fingerstick Glucose (POCT), , , TID AC, DUNG Fuentes    insulin lispro (HumaLOG) 100 units/mL subcutaneous injection 3 Units, 3 Units, Subcutaneous, TID With Meals, DUNG Fuentes, 3 Units at 05/20/21 1206    melatonin tablet 6 mg, 6 mg, Oral, HS, DUNG Fuentes, 6 mg at 05/19/21 2214    metoprolol succinate (TOPROL-XL) 24 hr tablet 100 mg, 100 mg, Oral, Daily, DUNG Fuentes, 100 mg at 05/20/21 9404    multivitamin-minerals (CENTRUM ADULTS) tablet 1 tablet, 1 tablet, Oral, Daily, DUNG Fuentes, 1 tablet at 05/20/21 0837    nitroglycerin (NITROSTAT) SL tablet 0 4 mg, 0 4 mg, Sublingual, Q5 Min PRN, DUNG Fuentes    ondansetron [facility]) injection 4 mg, 4 mg, Intravenous, Q6H PRN, DUNG Fuentes    pantoprazole (PROTONIX) EC tablet 40 mg, 40 mg, Oral, Early Morning, DUNG Tipton, 40 mg at 05/20/21 0545    phenol (CHLORASEPTIC) 1 4 % mucosal liquid 1 spray, 1 spray, Mouth/Throat, Q2H PRN, DUNG Tipton, 1 spray at 05/20/21 4389    polyethylene glycol (MIRALAX) packet 17 g, 17 g, Oral, Daily PRN, DUNG Tipton    [START ON 5/21/2021] predniSONE tablet 40 mg, 40 mg, Oral, Daily **FOLLOWED BY** [START ON 5/23/2021] predniSONE tablet 30 mg, 30 mg, Oral, Daily **FOLLOWED BY** [START ON 5/25/2021] predniSONE tablet 20 mg, 20 mg, Oral, Daily **FOLLOWED BY** [START ON 5/27/2021] predniSONE tablet 10 mg, 10 mg, Oral, Daily, Iveth Mcintyre PA-C    saccharomyces boulardii (FLORASTOR) capsule 250 mg, 250 mg, Oral, BID, DUNG Tipton, 250 mg at 05/20/21 9449    simethicone (MYLICON) chewable tablet 80 mg, 80 mg, Oral, Q6H PRN, DUNG Tipton, 80 mg at 05/08/21 1101    sodium chloride (OCEAN) 0 65 % nasal spray 2 spray, 2 spray, Each Nare, 4x Daily, Anurag Figueroa PA-C, 2 spray at 05/20/21 1205    sulfamethoxazole-trimethoprim (BACTRIM DS) 800-160 mg per tablet 2 tablet, 2 tablet, Oral, Q12H [facility], DUNG Tipton, 2 tablet at 05/20/21 0155    Principal Problem:    Acute respiratory failure with hypoxia (HCC)  Active Problems:    Coronary disease    Type 2 diabetes mellitus without complication, without long-term current use of insulin (HCC)    Lymphoma (HCC)    Essential hypertension    Acute systolic congestive heart failure (HCC)    Leukocytosis    Paroxysmal atrial fibrillation (HCC)    Gastroesophageal reflux disease with esophagitis    Pneumonia due to COVID-19 virus    Acute on chronic blood loss anemia    Pulmonary alveolar hemorrhage    Mouth ulcers      Nena Jacobo MD

## 2021-05-20 NOTE — CASE MANAGEMENT
LOS: 18 days     Call to [facility] ([phone])  Liaison for [facility] to inquire about getting a back pack small O2 tank  Order for OCD put in parachute as directed  Per [employer] they do have the order and will see patient in the home for an eval   Met with patient and wife at bedside and discussed O2  Provided with [employer] #[phone]  Romero Mcgarry CM following

## 2021-05-21 ENCOUNTER — TELEPHONE (OUTPATIENT)
Dept: FAMILY MEDICINE CLINIC | Facility: HOSPITAL | Age: 76
End: 2021-05-21

## 2021-05-21 ENCOUNTER — TRANSITIONAL CARE MANAGEMENT (OUTPATIENT)
Dept: FAMILY MEDICINE CLINIC | Facility: HOSPITAL | Age: 76
End: 2021-05-21

## 2021-05-21 DIAGNOSIS — E11.9 TYPE 2 DIABETES MELLITUS WITHOUT COMPLICATION, WITHOUT LONG-TERM CURRENT USE OF INSULIN (HCC): Primary | Chronic | ICD-10-CM

## 2021-05-21 RX ORDER — INSULIN DETEMIR 100 [IU]/ML
8 INJECTION, SOLUTION SUBCUTANEOUS DAILY
Qty: 3 ML | Refills: 1 | Status: SHIPPED | OUTPATIENT
Start: 2021-05-21 | End: 2021-06-04

## 2021-05-21 NOTE — ASSESSMENT & PLAN NOTE
Patient had acute on chronic dose anemia due to diffuse alveolar hemorrhage requiring blood transfusions  Hemoglobin is 8 3 on discharge        Patient will continue iron sulfate supplementation home

## 2021-05-21 NOTE — ASSESSMENT & PLAN NOTE
Patient admitted with acute hypoxic respiratory failure due to a combination of possible bacterial pneumonia, acute systolic CHF, post COVID organizing pneumonia, diffuse alveolar hemorrhage  Repeat COVID testing came back negative x2    He was treated with high-flow nasal cannula    Patient underwent bronchoscopy  Repeat CT of the chest showed marked improvement in bilateral infiltrates    Final PCP results are not back in  Patient has acute hypoxic respiratory failure significantly improved  Today he qualified for home oxygen with exertion because he desaturated to less than 88%  He requires oxygen home with exertion at 2 5 liters/minute  Patient is stable for discharge: On lung examination I heard no inspiratory crackles or wheezing  Heart was regular rhythm with systolic murmur and patient had no pedal edema    I discussed the case with dr Violetta Ulrich:  Patient will take Bactrim for 10 days    Patient will taper prednisone as per Pulmonary recommendation starting from 40 mg to 10 mg over 8 days    I discussed the case with patient's wife at the bedside in detail on May 20th

## 2021-05-21 NOTE — TELEPHONE ENCOUNTER
Spoke to pt and scheduled TCM for 5/27/21  He states he is having some constipation  Last BM was 2 days ago  No abd  Pain and no bloating  Wants to know what type of stool softener he should use

## 2021-05-21 NOTE — TELEPHONE ENCOUNTER
Does he or his wife feel comfortable giving him insulin shots?   Steroids are known to elevate BS and we can increase  His Metformin but with a BS of 400 he is still likely going to be too high on just Metformin and would best benefit from short course of insulin until off the steroid

## 2021-05-21 NOTE — NURSING NOTE
Patient's IV was removed  Reviewed discharge instructions  Taken to front amelia in wheelchair  Wife present to provide transport home

## 2021-05-21 NOTE — DISCHARGE SUMMARY
[place]  Discharge- Felecia Pyle 1945, 77 y o  male MRN: [id number]  Unit/Bed#: -01 Encounter: [id number]  Primary Care Provider: Ean Marrero DO   Date and time admitted to hospital: 5/2/2021  2:28 PM    * Acute respiratory failure with hypoxia [facility])  Assessment & Plan  Patient admitted with acute hypoxic respiratory failure due to a combination of possible bacterial pneumonia, acute systolic CHF, post COVID organizing pneumonia, diffuse alveolar hemorrhage  Repeat COVID testing came back negative x2    He was treated with high-flow nasal cannula    Patient underwent bronchoscopy  Repeat CT of the chest showed marked improvement in bilateral infiltrates    Final PCP results are not back in  Patient has acute hypoxic respiratory failure significantly improved  Today he qualified for home oxygen with exertion because he desaturated to less than 88%  He requires oxygen home with exertion at 2 5 liters/minute  Patient is stable for discharge: On lung examination I heard no inspiratory crackles or wheezing  Heart was regular rhythm with systolic murmur and patient had no pedal edema    I discussed the case with dr Kinsey Michel:  Patient will take Bactrim for 10 days  Patient will taper prednisone as per Pulmonary recommendation starting from 40 mg to 10 mg over 8 days    I discussed the case with patient's wife at the bedside in detail on May 20th    Pulmonary alveolar hemorrhage  Assessment & Plan  Patient underwent bronchoscopy that showed diffuse under hemorrhage  Anticoagulation is discontinued as per Pulmonary recommendation        Acute systolic congestive heart failure (HCC)  Assessment & Plan  Wt Readings from Last 3 Encounters:   05/20/21 63 9 kg (140 lb 12 8 oz)   04/20/21 73 2 kg (161 lb 6 oz)   04/10/21 76 8 kg (169 lb 5 oz)       Ejection fraction on echo showed 40% on this admission    Acute hypoxic respiratory failure was partially caused by acute systolic CHF  Patient was treated with IV Lasix  Patient has no evidence of volume overload on day of discharge and he will continue taking Lasix 20 mg p o  B i d  Patient's potassium is 5 1 likely due to Bactrim  I asked him not to resume his lisinopril at home to avoid hyperkalemia    Also, Systolic blood pressure is  which is a 2nd reason I asked him not to resume lisinopril on discharge to avoid hypotension  Type 2 diabetes mellitus without complication, without long-term current use of insulin [facility])  Assessment & Plan  Lab Results   Component Value Date    HGBA1C 7 4 (H) 05/13/2021       Recent Labs     05/19/21  2143 05/20/21  0712 05/20/21  1103 05/20/21  1543   POCGLU 155* 166* 282* 231*       Blood Sugar Average: Last 72 hrs:  (P) 747 [id number]     Patient has type 2 diabetes and had hyperglycemiadue to IV Solu-Medrol that patient was on for possible PCP pneumonia    He denies any diarrhea, will resume metformin as an outpatient    Paroxysmal atrial fibrillation [facility])  Assessment & Plan  Patient has history of proximal atrial fibrillation  He is in sinus rhythm on discharge  Eliquis is on hold due to diffuse pulmonary hemorrhage    He will continue Toprol as outpatient    Acute on chronic blood loss anemia  Assessment & Plan  Patient had acute on chronic dose anemia due to diffuse alveolar hemorrhage requiring blood transfusions  Hemoglobin is 8 3 on discharge  Patient will continue iron sulfate supplementation home            Hospital Course:     Michelle Anderson is a 77 y o  male patient who originally presented to the hospital on   Admission Orders (From admission, onward)     Ordered        05/02/21 1616  Inpatient Admission  Once                  due to multifactorial acute hypoxic respiratory failure    Please see above list of diagnoses and related plan for additional information       Condition at Discharge:  good      Discharge instructions/Information to patient and family:   See after visit summary for information provided to patient and family  Provisions for Follow-Up Care:  See after visit summary for information related to follow-up care and any pertinent home health orders  Disposition:     Home with VNA Services (Reminder: Complete face to face encounter)       Discharge Statement:  I spent 40 minutes discharging the patient  This time was spent on the day of discharge  I had direct contact with the patient on the day of discharge  Greater than 50% of the total time was spent examining patient, answering all patient questions, arranging and discussing plan of care with patient as well as directly providing post-discharge instructions  Additional time then spent on discharge activities  Discharge Medications:  See after visit summary for reconciled discharge medications provided to patient and family        ** Please Note: This note has been constructed using a voice recognition system **

## 2021-05-21 NOTE — TELEPHONE ENCOUNTER
Was on Levemir 8 U in hospital - pen for Levemir sent to local pharmacy, check sugars 2-3 times a day and if consistently > 300 call over weeken, if under 300 call on Monday with BS readings and will adjust according to get control of BS readings

## 2021-05-21 NOTE — ASSESSMENT & PLAN NOTE
Patient has history of proximal atrial fibrillation  He is in sinus rhythm on discharge      Eliquis is on hold due to diffuse pulmonary hemorrhage    He will continue Toprol as outpatient

## 2021-05-21 NOTE — ASSESSMENT & PLAN NOTE
Wt Readings from Last 3 Encounters:   05/20/21 63 9 kg (140 lb 12 8 oz)   04/20/21 73 2 kg (161 lb 6 oz)   04/10/21 76 8 kg (169 lb 5 oz)       Ejection fraction on echo showed 40% on this admission  Acute hypoxic respiratory failure was partially caused by acute systolic CHF  Patient was treated with IV Lasix  Patient has no evidence of volume overload on day of discharge and he will continue taking Lasix 20 mg p o  B i d  Patient's potassium is 5 1 likely due to Bactrim  I asked him not to resume his lisinopril at home to avoid hyperkalemia    Also, Systolic blood pressure is  which is a 2nd reason I asked him not to resume lisinopril on discharge to avoid hypotension

## 2021-05-21 NOTE — TELEPHONE ENCOUNTER
Spoke to wife, ok with doing injections but wants to come in for demonstration first can you send med over now so she can come over to be shown how to use it?

## 2021-05-21 NOTE — ASSESSMENT & PLAN NOTE
Patient underwent bronchoscopy that showed diffuse under hemorrhage    Anticoagulation is discontinued as per Pulmonary recommendation

## 2021-05-21 NOTE — ASSESSMENT & PLAN NOTE
Lab Results   Component Value Date    HGBA1C 7 4 (H) 05/13/2021       Recent Labs     05/19/21  2143 05/20/21  0712 05/20/21  1103 05/20/21  1543   POCGLU 155* 166* 282* 231*       Blood Sugar Average: Last 72 hrs:  (P) 630 [id number]     Patient has type 2 diabetes and had hyperglycemiadue to IV Solu-Medrol that patient was on for possible PCP pneumonia    He denies any diarrhea, will resume metformin as an outpatient

## 2021-05-21 NOTE — TELEPHONE ENCOUNTER
Would start MiraLax OTC once daily - can take 2-3 days to reach max effect, can then add OTC Colace 100 mg up to twice daily as needed, plenty of fluids and high fiber diet with fruits and vegetable and keeping mobile/active can help as well, call if no better by middle of next week

## 2021-05-21 NOTE — TELEPHONE ENCOUNTER
Spoke to wife states Ana Stefano just checked surgar and it is 414  States he is feeling fine  Takes metformin 1 pill in the morning  Just started 10 day dose of prednisone  States while he was in the hospital he was given insulin but was not d/c on any  Please advise

## 2021-05-22 LAB
Lab: NORMAL
Lab: NORMAL
MISCELLANEOUS LAB TEST RESULT: NORMAL

## 2021-05-23 ENCOUNTER — NURSE TRIAGE (OUTPATIENT)
Dept: OTHER | Facility: OTHER | Age: 76
End: 2021-05-23

## 2021-05-23 NOTE — TELEPHONE ENCOUNTER
Reason for Disposition   [1] Caller has URGENT medication or insulin pump question AND [2] triager unable to answer question    Answer Assessment - Initial Assessment Questions  1  BLOOD GLUCOSE: "What is your blood glucose level?"       301 and 286    2  ONSET: "When did you check the blood glucose?"      Within the last 15 minutes     3  USUAL RANGE: "What is your glucose level usually?" (e g , usual fasting morning value, usual evening value)      Unsure  Checking his BS is new upon recent discharge from the hospital on Thursday, 5/20    4  KETONES: "Do you check for ketones (urine or blood test strips)?" If yes, ask: "What does the test show now?"       Denies     5  TYPE 1 or 2:  "Do you know what type of diabetes you have?"  (e g , Type 1, Type 2, Gestational; doesn't know)       Type 2     6  INSULIN: "Do you take insulin?" "What type of insulin(s) do you use? What is the mode of delivery? (syringe, pen; injection or pump)? "       insulin detemir- patient takes 8 units daily around 6pm    7  DIABETES PILLS: "Do you take any pills for your diabetes?" If yes, ask: "Have you missed taking any pills recently?"      Metformin 500mg daily    8  OTHER SYMPTOMS: "Do you have any symptoms?" (e g , fever, frequent urination, difficulty breathing, dizziness, weakness, vomiting)      Experiencing long lasting symptoms of COVID  Not worse/different than they have been  Thursday was sent home from the hospital after 20 days of severe COVID   On steroids    Protocols used: DIABETES - HIGH BLOOD SUGAR-ADULT-

## 2021-05-23 NOTE — TELEPHONE ENCOUNTER
Regarding: High Blood Sugar   ----- Message from Colton Madrigal sent at 5/23/2021  1:40 PM EDT -----  " The Doctor said to call if his Blood Sugar Level went over 300, its now 301  "

## 2021-05-23 NOTE — TELEPHONE ENCOUNTER
[employer] message sent to Dr Jodie Casey with patient's blood sugar reading of 301 and 286 in the last 30 minutes  Patient hasn't taken his Levemir yet  Per Dr Germania Bridges, since patient hasn't taken his insulin, he should take it now and take 12 units today, rather than 8  Spoke with Juice Argueta, patient's wife, and made her aware of the recommendation  She is going to give patient 12 units of Levemir right now  Denies further questions or concerns at this time

## 2021-05-24 ENCOUNTER — TELEPHONE (OUTPATIENT)
Dept: FAMILY MEDICINE CLINIC | Facility: HOSPITAL | Age: 76
End: 2021-05-24

## 2021-05-24 NOTE — TELEPHONE ENCOUNTER
con't 12 Units for now, if BS start dropping below 180's then decrease insulin to 10 U and then if below 160's decrease insulin to 8 U, call if < 140-150's

## 2021-05-24 NOTE — TELEPHONE ENCOUNTER
[facility] nurse manager calling again - patient is in need of a portable O2 concentrator so he can travel to/from doctor appointments  Please be sure the script states the route is nasal canula and that he is currently prescribed 2-3L       Pls fax to Darien Gilmore @ [phone]

## 2021-05-24 NOTE — TELEPHONE ENCOUNTER
[facility] Nurse  called after conversation with patient  Says patient asking for a script for a  preferred brand glucometer to be sent to [facility]  Also patient asking for portable O2  Call patient with any further info needed

## 2021-05-24 NOTE — TELEPHONE ENCOUNTER
Called patient regarding health call over the weekend     He called Sunday  Blood sugar  301   Increased  To 12 units       286 b/s    9 pm   This am  Monday   147 b/s   415am   Pt asking should he continue the 12 units or return to 8 units   Pt     Also reports he is currently  decreasing the steroids

## 2021-05-26 ENCOUNTER — DOCUMENTATION (OUTPATIENT)
Dept: SOCIAL WORK | Facility: HOSPITAL | Age: 76
End: 2021-05-26

## 2021-05-26 ENCOUNTER — TELEPHONE (OUTPATIENT)
Dept: PULMONOLOGY | Facility: CLINIC | Age: 76
End: 2021-05-26

## 2021-05-26 NOTE — TELEPHONE ENCOUNTER
Spoke with patient:  Patient was notified no malignancy per bronchoscopy will follow-up as previously scheduled

## 2021-05-26 NOTE — TELEPHONE ENCOUNTER
----- Message from Yvette Carreon MD sent at 5/26/2021  9:00 AM EDT -----  I reviewed the results of your recent bronchoscopy  There was no cancer detected on the sample  Please follow up as previously scheduled

## 2021-05-27 ENCOUNTER — DOCUMENTATION (OUTPATIENT)
Dept: SOCIAL WORK | Facility: HOSPITAL | Age: 76
End: 2021-05-27

## 2021-05-27 ENCOUNTER — OFFICE VISIT (OUTPATIENT)
Dept: FAMILY MEDICINE CLINIC | Facility: HOSPITAL | Age: 76
End: 2021-05-27
Payer: COMMERCIAL

## 2021-05-27 VITALS — BODY MASS INDEX: 19.6 KG/M2 | HEIGHT: 71 IN | WEIGHT: 140 LBS | TEMPERATURE: 97.8 F

## 2021-05-27 DIAGNOSIS — J12.82 PNEUMONIA DUE TO COVID-19 VIRUS: ICD-10-CM

## 2021-05-27 DIAGNOSIS — J96.01 ACUTE RESPIRATORY FAILURE WITH HYPOXIA (HCC): ICD-10-CM

## 2021-05-27 DIAGNOSIS — E11.9 TYPE 2 DIABETES MELLITUS WITHOUT COMPLICATION, WITHOUT LONG-TERM CURRENT USE OF INSULIN (HCC): Chronic | ICD-10-CM

## 2021-05-27 DIAGNOSIS — I50.21 ACUTE SYSTOLIC CONGESTIVE HEART FAILURE (HCC): ICD-10-CM

## 2021-05-27 DIAGNOSIS — R04.89 PULMONARY ALVEOLAR HEMORRHAGE: ICD-10-CM

## 2021-05-27 DIAGNOSIS — U07.1 PNEUMONIA DUE TO COVID-19 VIRUS: ICD-10-CM

## 2021-05-27 DIAGNOSIS — D62 ACUTE ON CHRONIC BLOOD LOSS ANEMIA: ICD-10-CM

## 2021-05-27 DIAGNOSIS — Z09 HOSPITAL DISCHARGE FOLLOW-UP: Primary | ICD-10-CM

## 2021-05-27 DIAGNOSIS — I10 ESSENTIAL HYPERTENSION: Chronic | ICD-10-CM

## 2021-05-27 DIAGNOSIS — C85.90 LYMPHOMA, UNSPECIFIED BODY REGION, UNSPECIFIED LYMPHOMA TYPE (HCC): Chronic | ICD-10-CM

## 2021-05-27 DIAGNOSIS — I48.0 PAROXYSMAL ATRIAL FIBRILLATION (HCC): Chronic | ICD-10-CM

## 2021-05-27 PROCEDURE — 1111F DSCHRG MED/CURRENT MED MERGE: CPT | Performed by: INTERNAL MEDICINE

## 2021-05-27 PROCEDURE — 99496 TRANSJ CARE MGMT HIGH F2F 7D: CPT | Performed by: INTERNAL MEDICINE

## 2021-05-27 NOTE — PROGRESS NOTES
Assessment/Plan:        Problem List Items Addressed This Visit        Endocrine    Type 2 diabetes mellitus without complication, without long-term current use of insulin (HCC) (Chronic)       Lab Results   Component Value Date    HGBA1C 7 4 (H) 05/13/2021   Sugars spiked at home with Prednisone, started on Levemir and titrated up to 12 U, now steroid almost tapered off (tomorrow last day) and BS coming down - down to 10 U, told to take 10 U today and tomorrow when on steroid and then drop to 8 U and monitor closely - stop insulin when BS < 150         Relevant Orders    Glucometer       Respiratory    Acute respiratory failure with hypoxia (HCC)     Felt to be d/t bacterial pneumonia compounded by lingering COVID pneumonia and alveolar hemorrhage and episode of acute systolic HF, tx with X2/CAGZM/RGP and steroids, Eliquis held d/t hemorrhage, doing better on home O2 via NC at 2L qhs ONLY, urged f/u with Pulm  6/9/21 as already scheduled         Pneumonia due to COVID-19 virus     Had Remdesivir and steroids during past hospital stay,  Treated mos recently again with steroids,  Repeat imaging prior to discharge with improvements in infiltrates, con't f/u with Pulm already scheduled for 6/9/21         Pulmonary alveolar hemorrhage     Mateusz held, had 1 U PRBC's transfused while IP, con't f/u as per Pulm            Cardiovascular and Mediastinum    Essential hypertension (Chronic)     NO BP reading today d/t virtual visit, has BP cuff at home and urged to check 2-3 x's a week d/t lisinopril being held, con't current Metoprolol for now, call with BP > 140/90, re-eval in office in July         Paroxysmal atrial fibrillation (HCC) (Chronic)     W/o current symptoms, Eliquis held d/t Pulm alveolar hemorrhage, on Metoprolol for rate control, will follow         Acute systolic congestive heart failure ([address] )     Wt Readings from Last 3 Encounters:   05/27/21 63 5 kg (140 lb)   05/20/21 63 9 kg (140 lb 12 8 oz)   04/20/21 73 2 kg (161 lb 6 oz)   Echo done and EF 40%, diuresed and switched to PO lasix, on beta blocker and Lipitor, encouraged to watch weights and call with any increase in weight or new/worse symptoms                  Other    Lymphoma (HCC) (Chronic)     Had recent PET as per IP record - no copy in chart, pt states he will make f/u appt with Heme/Onc for further eval         Acute on chronic blood loss anemia     Thought to be d/t pulm hemorrhage, Eliquis held as per Pulm, con't f/u as per pulm, currently w/o s/sx of significant anemia, on PO iron once daily, will follow           Other Visit Diagnoses     Hospital discharge follow-up    -  Primary             Reason for visit is TCM/Hospital follow up    Encounter provider Petey Guzman DO       Provider located at [address] [place] [address] MD [address] [address]      Recent Visits  Date Type Provider Dept   05/24/21 Telephone Aleksandra Brush Md   05/21/21 Telephone Boy Brush Md   05/21/21 Telephone Boy Brush Md   Showing recent visits within past 7 days and meeting all other requirements     Today's Visits  Date Type Provider Dept   05/27/21 Office Visit DO Jayant Hebert Md   Showing today's visits and meeting all other requirements     Future Appointments  No visits were found meeting these conditions  Showing future appointments within next 150 days and meeting all other requirements        After connecting through "[employer]", the patient was identified by name and date of birth  Chad Colorado was informed that this is a telemedicine visit and that the visit is being conducted through [address] [place] [address] Now and patient was informed that this is a secure, HIPAA-compliant platform  He agrees to proceed     My office door was closed  No one else was in the room    He acknowledged consent and understanding of privacy and security of the video platform  The patient has agreed to participate and understands they can discontinue the visit at any time  Patient is aware this is a billable service  Subjective:     Patient ID: Hetal Hastings is a 77 y o  male being evaluated for a hospital follow up  HPI Pt was admitted to [facility] from 5/2/21 till 5/20/21 - records were reviewed by myself in detail and events are summarized below  Pt presented to [facility] on 5/2/21 with c/o cough/SOB and fever  He had had his 2nd COVID vaccine just prior  He had been hospitalized 2 x's in the previous months for COVID and COVID pneumonia and was tx with Remdesivir and Decadron and then again another course of steroids  In the ED 5/2/21 his O2 sat was 77% on RA and BP was 193/88,  and resp 22  Temp was documented at 99 7   BW showed low Alb and Tot protein as well as an elevated Alk Phos and an elevation of his INR  Rest of CMP was wnl  CBC with WBC ct 11 21, H/H 8 7/27 9 and plt 216  MCV was 80  He was COVID neg  CXR showed extensive mildly progressive B/L opacities  CTA of chest showed no PE but increase in consolidation  He was admitted for sepsis/pneumonia and lactic acidosis as well as acute hypoxemic resp failure  He was started on Vanco and Levaquin and eventually switched to just Levaquin after MRSA screen was negative  He was seen by Pulm and ID  He was started on steroid again after worsening sats and symptoms and exam   He was transferred to ICU d/t worsening hypoxemia  He was started on Bactrim for poss PCP pneumonia d/t his dx of lymphoma  He also had an elevated BNP and crackles and was started on IV lasix  Echo was done 5/4/21 and EF was 40% and moderate diffuse hypokinesis was noted  His lisinopril was held d/t low BP during hospital stay as well  He was too unstable for a bronch but did improve gradually and a bronch was done 5/15/21  A pulm alveolar hemorrhage was noted   Pts Hgb did drop and he did receive 1 U PRBC's and his Eliquis for his A fib was held  He had significant improvement in symptoms and O2 demand improved  He was switched to oral Prednisone and Lasix and discharged home on 5/20/21  Med list was reviewed  Pt has been doing well since discharge  He states he is feeling better every day now  He is only using the O2 at night at 2 L NC  He notes some SOB with activity during the day but he does not use the O2 during the day  He notes no cough/wheezing/F/C  He notes no dizziness/lightheadness but is fatigued  He is down 25-30 lbs in wgt  He is on a diabetic diet  He is on steroids and tomorrow is his last dose  His sugars jumped up and he called and was placed on Levemir and was titrated up to 12 U  He dropped to 10 U as his BS was down to 180's  He is still taking his Metformin as directed  He states he does feel hungry "sometimes" but appetite is not fully back  He notes no LE edema/PND/palp  He notes SOB is worse with laying down  States O2 sat is in the 90's most of the time and dips to upper 80's at night  Pt was notified yesterday his PCP was neg and told to stop his Bactrim  He has had PT come and is doing his home exercises  Review of Systems   Constitutional: Positive for fatigue and unexpected weight change  Negative for chills and fever  HENT: Negative for congestion and sinus pain  Eyes: Negative for pain and visual disturbance  Respiratory: Positive for shortness of breath  Negative for cough and wheezing  Cardiovascular: Negative for chest pain, palpitations and leg swelling  Gastrointestinal: Negative for abdominal pain, diarrhea, nausea and vomiting  Endocrine: Negative for polydipsia and polyuria  Genitourinary: Negative for difficulty urinating and dysuria  Musculoskeletal: Negative for arthralgias and myalgias  Skin: Negative for rash and wound  Neurological: Negative for dizziness and headaches  Hematological: Does not bruise/bleed easily  Psychiatric/Behavioral: Negative for behavioral problems and confusion  Objective:    Vitals:    05/27/21 1531   Temp: 97 8 °F (36 6 °C)   Weight: 63 5 kg (140 lb)   Height: 5' 11" (1 803 m)       Physical Exam  Vitals signs and nursing note reviewed  Constitutional:       General: He is not in acute distress  Appearance: He is well-developed  Comments: thin   HENT:      Head: Normocephalic and atraumatic  Eyes:      General:         Right eye: No discharge  Left eye: No discharge  Conjunctiva/sclera: Conjunctivae normal    Neck:      Trachea: No tracheal deviation  Pulmonary:      Effort: Pulmonary effort is normal  No respiratory distress  Skin:     Coloration: Skin is not pale  Findings: No rash  Neurological:      Mental Status: He is alert  Motor: No abnormal muscle tone  Psychiatric:         Mood and Affect: Mood normal          Behavior: Behavior normal          Thought Content: Thought content normal          Judgment: Judgment normal              Transitional Care Management Review:  Navjot Bajwa is a 77 y o  male here for TCM follow up  During the TCM phone call patient stated:    TCM Call (since 4/26/2021)     Date and time call was made  5/21/2021  1:39 PM    Hospital care reviewed  Records reviewed    Patient was hospitialized at  [address] [place] [address]        Date of Admission  05/02/21    Date of discharge  05/20/21    Diagnosis  acute respiratory failure    Disposition  Home; Home health services    Were the patients medications reviewed and updated  No    Current Symptoms  Constipation      TCM Call (since 4/26/2021)     Post hospital issues  None    Should patient be enrolled in anticoag monitoring? No    Scheduled for follow up?   Yes    Patients specialists  [facility]    Did you obtain your prescribed medications  Yes    Do you need help managing your prescriptions or medications  No    Is transportation to your appointment needed No    I have advised the patient to call PCP with any new or worsening symptoms  Carolina Noyola MA          I spent 40 minutes with the patient during this visit      Tony Andrew DO

## 2021-05-27 NOTE — ASSESSMENT & PLAN NOTE
Lab Results   Component Value Date    HGBA1C 7 4 (H) 05/13/2021   Sugars spiked at home with Prednisone, started on Levemir and titrated up to 12 U, now steroid almost tapered off (tomorrow last day) and BS coming down - down to 10 U, told to take 10 U today and tomorrow when on steroid and then drop to 8 U and monitor closely - stop insulin when BS < 150

## 2021-05-27 NOTE — ASSESSMENT & PLAN NOTE
Wt Readings from Last 3 Encounters:   05/27/21 63 5 kg (140 lb)   05/20/21 63 9 kg (140 lb 12 8 oz)   04/20/21 73 2 kg (161 lb 6 oz)   Echo done and EF 40%, diuresed and switched to PO lasix, on beta blocker and Lipitor, encouraged to watch weights and call with any increase in weight or new/worse symptoms

## 2021-05-27 NOTE — ASSESSMENT & PLAN NOTE
W/o current symptoms, Eliquis held d/t Pulm alveolar hemorrhage, on Metoprolol for rate control, will follow

## 2021-05-27 NOTE — ASSESSMENT & PLAN NOTE
Had Remdesivir and steroids during past hospital stay,  Treated mos recently again with steroids,  Repeat imaging prior to discharge with improvements in infiltrates, con't f/u with Pulm already scheduled for 6/9/21

## 2021-05-27 NOTE — PROGRESS NOTES
Admission Report at [facility]'s [facility] has admitted your patient to [address] [place] [address] service with the following disciplines:      SN  This report is informational only, no responses is needed   Primary focus of home health carepulmonary assess   Patient stated goals of care get back to normal routine and not depend on O2  Anticipated visit pattern and next visit date 2x week for 3 weeks then 1x week  Significant clinical findings none   Request for additional disciplinesno PT ordered   Request for medication clarificationno  Request for other order clarificationno  Needs follow up physician appointments scheduled  Potential barriers to goal achievement deconditioned   Other pertinent informationnone    Thank you for allowing us to participate in the care of your patient        Anaid Romero RN

## 2021-05-27 NOTE — ASSESSMENT & PLAN NOTE
NO BP reading today d/t virtual visit, has BP cuff at home and urged to check 2-3 x's a week d/t lisinopril being held, con't current Metoprolol for now, call with BP > 140/90, re-eval in office in July

## 2021-05-27 NOTE — ASSESSMENT & PLAN NOTE
Thought to be d/t pulm hemorrhage, Eliquis held as per Pulm, con't f/u as per pulm, currently w/o s/sx of significant anemia, on PO iron once daily, will follow

## 2021-05-27 NOTE — ASSESSMENT & PLAN NOTE
Eliquis held, had 1 U PRBC's transfused while IP, con't f/u as per [facility]

## 2021-05-27 NOTE — ASSESSMENT & PLAN NOTE
Felt to be d/t bacterial pneumonia compounded by lingering COVID pneumonia and alveolar hemorrhage and episode of acute systolic HF, tx with Q8/XXYCP/YYO and steroids, Eliquis held d/t hemorrhage, doing better on home O2 via NC at 2L qhs ONLY, urged f/u with Pulm  6/9/21 as already scheduled

## 2021-06-04 ENCOUNTER — TELEPHONE (OUTPATIENT)
Dept: FAMILY MEDICINE CLINIC | Facility: HOSPITAL | Age: 76
End: 2021-06-04

## 2021-06-04 NOTE — TELEPHONE ENCOUNTER
Spoke to patient  Not experiencing any effects from taking the medication  Thinks its just inconvenient to have to check blood sugars multiple times a day and keep making adjustments to the medication  Wants to increase his metformin instead of having to take levemir  Please advise

## 2021-06-04 NOTE — TELEPHONE ENCOUNTER
[facility] nurse Aisha Mac called states patient does not want to continue taking the Levemer  Patient would like to know if he can increase his metformin or if there was something else that can be prescribed?

## 2021-06-04 NOTE — TELEPHONE ENCOUNTER
Plan is to titrate Levimir off once steroids are finished - it is not planned on being a long term medication - verify when/what date he finishes his steroids and how his sugars have been and how many  Units of Levimir he is now on

## 2021-06-04 NOTE — TELEPHONE ENCOUNTER
Sonia Lyn finished the steroids last Friday (5/28/21)  BS Tuesday 112/203/193  Wednesday 158/172/197  Thursday 136/303/176  Today 139     Was taking 8 units of levemir but hasnt taken any since Monday

## 2021-06-09 ENCOUNTER — OFFICE VISIT (OUTPATIENT)
Dept: PULMONOLOGY | Facility: HOSPITAL | Age: 76
End: 2021-06-09
Payer: COMMERCIAL

## 2021-06-09 VITALS
WEIGHT: 151.2 LBS | BODY MASS INDEX: 21.17 KG/M2 | DIASTOLIC BLOOD PRESSURE: 62 MMHG | OXYGEN SATURATION: 97 % | HEIGHT: 71 IN | HEART RATE: 84 BPM | TEMPERATURE: 97.7 F | SYSTOLIC BLOOD PRESSURE: 126 MMHG

## 2021-06-09 DIAGNOSIS — J96.01 ACUTE RESPIRATORY FAILURE WITH HYPOXIA (HCC): Primary | ICD-10-CM

## 2021-06-09 DIAGNOSIS — J18.9 HCAP (HEALTHCARE-ASSOCIATED PNEUMONIA): ICD-10-CM

## 2021-06-09 PROCEDURE — 99214 OFFICE O/P EST MOD 30 MIN: CPT | Performed by: INTERNAL MEDICINE

## 2021-06-09 PROCEDURE — 3078F DIAST BP <80 MM HG: CPT | Performed by: INTERNAL MEDICINE

## 2021-06-09 PROCEDURE — 1036F TOBACCO NON-USER: CPT | Performed by: INTERNAL MEDICINE

## 2021-06-09 PROCEDURE — 1160F RVW MEDS BY RX/DR IN RCRD: CPT | Performed by: INTERNAL MEDICINE

## 2021-06-09 PROCEDURE — 3074F SYST BP LT 130 MM HG: CPT | Performed by: INTERNAL MEDICINE

## 2021-06-09 PROCEDURE — 3008F BODY MASS INDEX DOCD: CPT | Performed by: INTERNAL MEDICINE

## 2021-06-09 NOTE — PROGRESS NOTES
Progress Note - Pulmonary   Cierra Loveless 77 y o  male MRN: [id number]   Encounter: [id number]      Assessment/Plan:  Patient is a 77year old male presenting for hospital follow-up  The patient had a prolonged admission for severe COVID-19 pneumonia  The patient is doing remarkably well  He reports his shortness of breath has significantly improved  He will on a dyspnea with significant activity  Chronic hypoxemic respiratory failure  -  no longer requiring oxygen at rest  -  will check overnight pulse oximetry prior to fully discontinuing supplemental oxygen    COVID-19 pneumonia  -  recovering very well    Lung nodule w/ adenopathy  -  patient is followed by oncologist at [facility]  -  on CT chest prior to [facility] in March, patient had 2 new nodules and adenopathy  It is unclear if the adenopathy is reactive in the setting of early stages of COVID versus related to the new nodules  -  patient is scheduled for PET-CT scan with his oncologist, if there is a concern for the need for biopsy, he wishes to have it at [facility]  Patient may follow up in 3 months or sooner as necessary  Orders:  Orders Placed This Encounter   Procedures    Pulse oximetry overnight     Order Specific Question:   Room Air     Answer:   Yes     Order Specific Question:   Room Air with/without Therapy     Answer:   no equipment     Order Specific Question:   FIO2     Answer:   No     Order Specific Question:   CPAP with O2     Answer:   No     Order Specific Question:   BIPAP with O2     Answer:   No     Order Specific Question:   Is this for Sleep Screening? Answer:   No       Subjective:   His initial positive was on 4/1/21  He stopped using oxygen several weeks ago  He notes his pulse ox is primarily in the mid 90s  The patient is concerned he may have sleep apnea  He is able to shower, cook and clean  He will note shortness of breath with significant activity        Inhaler Regimen:  None    Remainder of review of systems negative except as described in HPI  The following portions of the patient's history were reviewed and updated as appropriate: allergies, current medications, past family history, past medical history, past social history, past surgical history and problem list      Objective:   Vitals: Blood pressure 126/62, pulse 84, temperature 97 7 °F (36 5 °C), temperature source Tympanic, height 5' 11" (1 803 m), weight 68 6 kg (151 lb 3 2 oz), SpO2 97 % , RA, Body mass index is 21 09 kg/m²  Physical Exam  Gen: Pleasant, awake, alert, oriented x 3, no acute distress  HEENT: Mucous membranes moist, no oral lesions, no thrush  NECK: No accessory muscle use, JVP not elevated  Cardiac: RRR, single S1, single S2, + murmur, no rubs, no gallops  Lungs: CTA b/l  Abdomen: normoactive bowel sounds, soft nontender, nondistended, no rebound or rigidity, no guarding  Extremities: no cyanosis, no clubbing, no LE edema  MSK:  Strength equal in all extremities  Derm:  No rashes/lesions noted  Neuro:  Appropriate mood/affect    Labs: I have personally reviewed pertinent lab results  Lab Results   Component Value Date    WBC 13 01 (H) 05/20/2021    WBC 6 7 05/19/2017    HGB 8 3 (L) 05/20/2021    HGB 14 5 05/19/2017     05/20/2021     05/19/2017     Lab Results   Component Value Date    CREATININE 1 11 05/20/2021    CREATININE 0 93 05/19/2017        Imaging and other studies: I have personally reviewed pertinent reports  and I have personally reviewed pertinent films in PACS  CT Chest 5/2021  My interpretation:  Mediastinal adenopathy    Radiology findings:  LUNGS:  Peripheral consolidation in the posterior segment right upper lobe and superior segment right lower lobe, new from 5/6/2021  Marked improvement in additional diffuse consolidation with residual groundglass opacity  Benign calcified granulomas  AIRWAYS: No significant filling defects    PLEURA:  Decrease in bilateral pleural effusions, trace, slightly greater on the left  HEART/GREAT VESSELS:  Mild cardiomegaly  AVR and CABG  Retained temporary cardiac pacer wires  MEDIASTINUM AND TERRI:  Minimally enlarged reactive mediastinal nodes  CHEST WALL AND LOWER NECK: Unremarkable    Pulmonary Function Testing:   No pulmonary function testing available for review  Chay Garza

## 2021-06-11 ENCOUNTER — TELEPHONE (OUTPATIENT)
Dept: CARDIOLOGY CLINIC | Facility: CLINIC | Age: 76
End: 2021-06-11

## 2021-06-11 DIAGNOSIS — I50.21 ACUTE SYSTOLIC CONGESTIVE HEART FAILURE (HCC): Primary | ICD-10-CM

## 2021-06-11 NOTE — TELEPHONE ENCOUNTER
Called, spoke to pt  Message relayed as given  Pt verbalized understanding  Order placed for BMP - pt uses [address]  He will stop in next week to p/u lab slip

## 2021-06-11 NOTE — TELEPHONE ENCOUNTER
NATASHA ParedesA -   Pt has 5lb weight gain in a week  Some dietary indiscretion noted  Some edema in L foot/ankle  Lungs clear, no SOB    Pt takes Lasix 20mg BID    Dr Jaun Fisher out of office -   [facility], please advise

## 2021-06-16 ENCOUNTER — TELEPHONE (OUTPATIENT)
Dept: CARDIOLOGY CLINIC | Facility: CLINIC | Age: 76
End: 2021-06-16

## 2021-06-16 DIAGNOSIS — U07.1 COVID-19: ICD-10-CM

## 2021-06-16 LAB
BUN SERPL-MCNC: 20 MG/DL (ref 8–27)
BUN/CREAT SERPL: 22 (ref 10–24)
CALCIUM SERPL-MCNC: 9.5 MG/DL (ref 8.6–10.2)
CHLORIDE SERPL-SCNC: 101 MMOL/L (ref 96–106)
CO2 SERPL-SCNC: 28 MMOL/L (ref 20–29)
CREAT SERPL-MCNC: 0.92 MG/DL (ref 0.76–1.27)
GLUCOSE SERPL-MCNC: 114 MG/DL (ref 65–99)
POTASSIUM SERPL-SCNC: 3.9 MMOL/L (ref 3.5–5.2)
SL AMB EGFR AFRICAN AMERICAN: 94 ML/MIN/1.73
SL AMB EGFR NON AFRICAN AMERICAN: 82 ML/MIN/1.73
SODIUM SERPL-SCNC: 140 MMOL/L (ref 134–144)

## 2021-06-16 RX ORDER — MELATONIN
2000 DAILY
Qty: 60 TABLET | Refills: 5 | Status: CANCELLED | OUTPATIENT
Start: 2021-06-16

## 2021-06-16 NOTE — TELEPHONE ENCOUNTER
Per [facility] , pt's weight is up 1 5 pounds more since 6/11 despite temporary increase in Lasix to 40mg BID through the weekend  Pt is back to usual dose of 20mg bid      Please advise, thank you

## 2021-06-16 NOTE — TELEPHONE ENCOUNTER
Visiting Nurse Georgette Edwards called & needs a call back from [address]      Patients weight is still up:  147 4    Still SOB    Getting a Sleep Apnea test on Friday    +1 edema     Please contact Lena

## 2021-06-27 NOTE — PROGRESS NOTES
Cardiology  Acute  Office Visit Note     Aj Styles   77 y o    male   MRN: [id number]  [facility]  [address] [id number] [phone]    PCP: Zulay Magallon DO  Cardiologist : Dr Curt Sebastian  Prior: Dr Jarad Silva Cardiology            Summary of Recommendations  Low-sodium diet, Heart failure education as below  Follow up will be scheduled with Dr Curt Sebastian      Impression/plan  Chronic systolic heart failure, with a drop in his EF from 60 (2/19) to 40% (9/19)  Stable  He is doing well  He is starting to gain some caloric weight back from dropping to 135 secondary to COVID  His dry weight is around 165    Wt Readings from Last 3 Encounters:   06/29/21 70 3 kg (155 lb)   06/09/21 68 6 kg (151 lb 3 2 oz)   05/27/21 63 5 kg (140 lb)         Wt Readings from Last 3 Encounters:   06/29/21 70 3 kg (155 lb)   06/09/21 68 6 kg (151 lb 3 2 oz)   05/27/21 63 5 kg (140 lb)     --Beta-blocker:   Metoprolol succinate 100 mg daily  --Diuretic:   Lasix 20 mg b i d   --2 g sodium diet, 1800 cc fluid restriction  Daily weights, call weight gain 2-3 lb in 1 day or 5 lb in 5 days  Paroxysmal atrial fibrillation, S/P Maze/TALIA ligation  Maintaining sinus rhythm, on aspirin  Coronary artery disease status post CABG, LIMA to the LAD 2014   On aspirin, statin, beta-blocker  status post bioprosthetic AVR 2014  Hypertension, essential  /58  Hyperlipidemia  On atorvastatin 40 mg/d   12/20: LDL 37  Type 2 diabetes mellitus  5/13/21; Hemoglobin A1c 7 4 on metformin  COVID-19  + 4/6/21  Roberts's esophagus/Schatzgis ring  multiple myeloma involving the eye, status post radiation  Lung nodule with adenopathy followed by Oncology at [facility]  PT/CT scan pending  Cardiac testing  ·  SPECT 2017 ( outside hosp) distal inferior ischemia  Ejection fraction 48%  Frequent PVCs  ·  EKG July 2018 incomplete left bundle branch block  · TTE 2/19  EF 60%  No RWMA  Mild biatrial enlargement  Mild MR  There was bioprosthetic aortic valve noted exhibiting normal function  There was mild TR   ·  NM myocardial SPECT 9/19 Abnormal study after pharmacologic stress without reproduction of symptoms  There was a small infarct in the apical region  Left ventricular systolic function was reduced, without distinct regional wall motion abnormalities  The calculated left ventricular ejection fraction was 40 %  Left ventricular ejection fraction was mildly decreased by visual estimate  There was mild global left ventricular hypokinesis   TTE 5/4/21  EF 40%  Moderate diffuse hypokinesis  RV mildly dilated  Mild biatrial enlargement  Mild MR  There is a bioprosthetic aortic valve exhibiting normal function  Mild TR  There was a left pleural effusion  HPI:   Kristen Garcia is a 78 yo male with coronary artery disease status post CABG,LIMA to the LAD, status post bioprosthetic aortic valve replacement, PAF status post Maze/TALIA ligation  His surgeries were performed at an outside hospital   He previously followed with Cardiology at [facility]  His ejection fraction was 60% by echo February 2019  He did undergo a nuclear stress test September 2019 showed a small infarct in the apical region  LV systolic function was reduced, to 40% with out distinct wall motion abnormalities  He has been medically managed  He also has small cell lymphoma and multiple myeloma  Adm 4/6-4/10/21  COVID + 4/1/21, after receiving a 2nd COVID vaccine several days before  Received remdesivir and IV steroids, vitamin supplements  Did not require O2 at discharge      Adm 4/15-4/20/21 pneumonia due to [place]  Hypoxic  CT: No PE  BMP nearly 8000  Treated with dexamethasone  Procalcitonin negative  On Eliquis given elevated D-dimer  Doppler negative  Discharged on Eliquis times 30 days    Discharged on home oxygen 2 L at rest 6 L with exercise        Adm 5/2--5/20/21  Acute respiratory failure with hypoxia  Possibilities likely multifactorial due to bacterial pneumonia, acute systolic heart failure, post COVID organizing pneumonia, diffuse alveolar hemorrhage  COVID testing was negative x2  He underwent bronchoscopy which showed diffuse alveolar hemorrhage  Anticoagulation was discontinued per pulmonary's recommendations  PCP results were pending at the time of discharge  He was discharged on home O2 2 5L  Discharged on antibiotics and steroids  He was not followed by Cardiology  He was diuresed with IV Lasix  He was discharged on Lasix 20 mg b i d  His potassium was elevated 5 1 suspected to be due to antibiotic therapy with Bactrim  Lisinopril was discontinued  His systolic blood pressure was also low    6/9 office visit pulmonary  No longer requiring oxygen at rest       6/11  [facility] called 5 lb weight gain  Lasix increased to 40 b i d  for few days then back down to Lasix 20 b i d       6/29/21  Follow-up visit  His weight is improved  It is still less than his dry weight of 165-168 before COVID  His dyspnea is improved  He is without oxygen  leg swelling-improved, on Lasix 20 BID  His blood pressure is satisfactory, he is in a regular rhythm by exam   He is on aspirin  He denies chest pain  Reviewed his most recent cardiac testing  He is aware of the findings  I have spent 40 minutes with Patient and family today in which greater than 50% of this time was spent in counseling/coordination of care regarding Intructions for management, Patient and family education, Importance of tx compliance and Risk factor reductions       Assessment  Diagnoses and all orders for this visit:    Chronic systolic heart failure ([address] )    History of aortic valve replacement with bioprosthetic valve    Hx of CABG    Dyslipidemia    Coronary artery disease involving native coronary artery of native heart without angina pectoris    Essential hypertension    Paroxysmal atrial fibrillation (HCC)        Past Medical History:   Diagnosis Date    Arthritis     Atrial fibrillation (HCC)     Cataract     Colitis     Colon cancer (HCC)     Diabetes mellitus type II, non insulin dependent ([facility] )     Fatty liver     History of chemotherapy     History of radiation therapy     History of shingles 2018    Hypertension     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[facility] )     Pneumonia     Skin cancer        Review of Systems   Constitutional: Negative for chills  Cardiovascular: Negative for chest pain, claudication, cyanosis, dyspnea on exertion, irregular heartbeat, leg swelling, near-syncope, orthopnea, palpitations, paroxysmal nocturnal dyspnea and syncope  Respiratory: Negative for cough and shortness of breath  Gastrointestinal: Negative for heartburn and nausea  Neurological: Negative for dizziness, focal weakness, headaches, light-headedness and weakness  All other systems reviewed and are negative  Allergies   Allergen Reactions    Ceftin [Cefuroxime] Itching     However, has tolerated Cefazolin and Pip-Tazo since, which have different side chains  Be cautious with / avoid 2nd, 3rd, or 4th Gen Cephs that have similar side chains to Cefuroxime   Lantus [Insulin Glargine] Itching           Current Outpatient Medications:     aspirin 81 MG tablet, Take 1 tablet (81 mg total) by mouth daily, Disp: , Rfl:     atorvastatin (LIPITOR) 40 mg tablet, Take 1 tablet (40 mg total) by mouth daily, Disp: 90 tablet, Rfl: 3    cholecalciferol (VITAMIN D3) 1,000 units tablet, Take 2 tablets (2,000 Units total) by mouth daily, Disp: 60 tablet, Rfl: 0    ferrous sulfate 324 MG TBEC, Take 1 tablet (324 mg total) by mouth daily with breakfast, Disp: 30 tablet, Rfl: 2    furosemide (LASIX) 20 mg tablet, TAKE 1 TABLET BY MOUTH TWICE A DAY, Disp: 180 tablet, Rfl: 1    metFORMIN (GLUCOPHAGE-XR) 500 mg 24 hr tablet, TAKE 1 TABLET BY MOUTH EVERY DAY WITH DINNER, Disp: 90 tablet, Rfl: 2    metoprolol succinate (TOPROL-XL) 100 mg 24 hr tablet, TAKE 1 TABLET BY MOUTH EVERY DAY, Disp: 90 tablet, Rfl: 1    multivitamin-minerals (CENTRUM ADULTS) tablet, Take 1 tablet by mouth daily, Disp:  , Rfl: 0    omeprazole (PriLOSEC) 40 MG capsule, Take 1 capsule (40 mg total) by mouth daily, Disp: 90 capsule, Rfl: 3    saccharomyces boulardii (FLORASTOR) 250 mg capsule, Take 1 capsule (250 mg total) by mouth 2 (two) times a day, Disp: 60 capsule, Rfl: 1    nitroglycerin (NITROSTAT) 0 3 mg SL tablet, Nitrostat 0 3 mg sublingual tablet  Place 1 tablet as needed by sublingual route as needed for 90 days  (Patient not taking: Reported on 2021), Disp: , Rfl:     Social History     Socioeconomic History    Marital status: /Civil Union     Spouse name: Not on file    Number of children: Not on file    Years of education: Not on file    Highest education level: Not on file   Occupational History    Occupation: WORKING FULLTIME    Tobacco Use    Smoking status: Former Smoker     Packs/day: 1 00     Years: 40 00     Pack years: 40 00     Types: Cigarettes     Quit date:      Years since quittin 4    Smokeless tobacco: Never Used   Vaping Use    Vaping Use: Never used   Substance and Sexual Activity    Alcohol use: Yes     Comment: very rare "beer here and there"    Drug use: No    Sexual activity: Not on file   Other Topics Concern    Not on file   Social History Narrative    Not on file     Social Determinants of Health     Financial Resource Strain:     Difficulty of Paying Living Expenses:    Food Insecurity:     Worried About [address] in the Last Year:     [address] in the Last Year:    Transportation Needs:     Lack of Transportation (Medical):      Lack of Transportation (Non-Medical):    Physical Activity:     Days of Exercise per Week:     Minutes of Exercise per Session:    Stress:     Feeling of Stress :    Social Connections:     Frequency of Communication with Friends and Family:     Frequency of Social Gatherings with Friends and Family:     Attends [employer] Services:     Active Member of Clubs or Organizations:     Attends Club or Organization Meetings:     Marital Status:    Intimate Partner Violence:     Fear of Current or Ex-Partner:     Emotionally Abused:     Physically Abused:     Sexually Abused:        Family History   Problem Relation Age of Onset    Heart block Family     Coronary artery disease Mother     Thrombosis Mother     Hypertension Mother     Arthritis Mother     Hyperlipidemia Mother     Diabetes Father     Hypertension Father     Arthritis Father     Sudden death Father         cardiac    Colon cancer Paternal Grandfather     Breast cancer Sister     Heart disease Brother         Coronary stents       Physical Exam  Vitals and nursing note reviewed  Constitutional:       General: He is not in acute distress  HENT:      Head: Normocephalic and atraumatic  Eyes:      Conjunctiva/sclera: Conjunctivae normal    Cardiovascular:      Rate and Rhythm: Normal rate and regular rhythm  Pulses: Intact distal pulses  Heart sounds: Normal heart sounds  Pulmonary:      Effort: Pulmonary effort is normal       Breath sounds: Normal breath sounds  Abdominal:      General: Bowel sounds are normal       Palpations: Abdomen is soft  Musculoskeletal:         General: Normal range of motion  Cervical back: Normal range of motion and neck supple  Skin:     General: Skin is warm and dry  Neurological:      Mental Status: He is alert and oriented to person, place, and time  Vitals: Blood pressure 126/58, pulse 77, height 5' 11" (1 803 m), weight 70 3 kg (155 lb)     Wt Readings from Last 3 Encounters:   06/29/21 70 3 kg (155 lb)   06/09/21 68 6 kg (151 lb 3 2 oz)   05/27/21 63 5 kg (140 lb)         Labs & Results:  Lab Results   Component Value Date    WBC 13 01 (H) 05/20/2021 HGB 8 3 (L) 2021    HCT 27 3 (L) 2021    MCV 84 2021     2021     No results found for: BNP  No components found for: CHEM    Results for orders placed during the hospital encounter of 19    Echo complete with contrast if indicated    Narrative  [facility] [address]    Transthoracic Echocardiogram  2D, M-mode, Doppler, and Color Doppler    Study date:  15-Feb-2019    Patient: Nini Woo  MR number: [id number]  Account number: [de-identified]  : 1945  Age: 68 years  Gender: Male  Status: Inpatient  Location: [facility]  Height: 71 in  Weight: 170 7 lb  BP: 155/ 74 mmHg    Indications: Chest pain/Pressure/ Tightness    Diagnoses: R07 9 - Chest pain, unspecified    Sonographer:  Kota Cunha [facility]  Primary Physician:  Tariq Bernardo DO  Referring Physician:  Anirudh Segovia DO  Group:  [id number]  Interpreting Physician:  Nancyann Goodell, MD    SUMMARY    LEFT VENTRICLE:  Systolic function was normal by visual assessment  Ejection fraction was estimated to be 60 %  There were no regional wall motion abnormalities  LEFT ATRIUM:  The atrium was mildly dilated  RIGHT ATRIUM:  The atrium was mildly dilated  MITRAL VALVE:  There was moderate annular calcification  There was mild regurgitation  AORTIC VALVE:  A bioprosthesis was present  It exhibited normal function  TRICUSPID VALVE:  There was mild regurgitation  HISTORY: PRIOR HISTORY: F  smoker; DM2; Bioprosthetic AV- Bovine; CAD; HTN; HLD; Afib; CABG; Anemia; Pleural Eff ; CHF    PROCEDURE: The study was performed in the [facility]  This was a routine study  The transthoracic approach was used  The study included complete 2D imaging, M-mode, complete spectral Doppler, and color Doppler  Echocardiographic views were limited due to decreased penetration and lung interference   This was a technically difficult study  LEFT VENTRICLE: Size was normal  Systolic function was normal by visual assessment  Ejection fraction was estimated to be 60 %  There were no regional wall motion abnormalities  Wall thickness was normal  DOPPLER: Transmitral flow pattern:  atrial fibrillation  RIGHT VENTRICLE: The size was normal  Systolic function was normal  DOPPLER: Systolic pressure was not estimated  LEFT ATRIUM: The atrium was mildly dilated  RIGHT ATRIUM: The atrium was mildly dilated  MITRAL VALVE: There was moderate annular calcification  Valve structure was normal  There was normal leaflet separation  DOPPLER: The transmitral velocity was within the normal range  There was no evidence for stenosis  There was mild  regurgitation  AORTIC VALVE: A bioprosthesis was present  It exhibited normal function  DOPPLER: Transaortic velocity was within the normal range  There was no evidence for stenosis  TRICUSPID VALVE: The valve structure was normal  There was normal leaflet separation  DOPPLER: The transtricuspid velocity was within the normal range  There was no evidence for stenosis  There was mild regurgitation  PULMONIC VALVE: Leaflets exhibited normal thickness, no calcification, and normal cuspal separation  DOPPLER: The transpulmonic velocity was within the normal range  There was no regurgitation  PERICARDIUM: There was no pericardial effusion  AORTA: The root exhibited normal size  SYSTEMIC VEINS: IVC: The inferior vena cava was normal in size and course   Respirophasic changes were normal     SYSTEM MEASUREMENT TABLES    2D  %FS: 33 1 %  AV Diam: 2 7 cm  EDV(Teich): 113 92 ml  EF(Teich): 61 5 %  ESV(Teich): 43 87 ml  IVSd: 0 81 cm  LA Area: 22 3 cm2  LA Diam: 4 57 cm  LVEDV MOD A4C: 104 15 ml  LVEF MOD A4C: 62 22 %  LVESV MOD A4C: 39 35 ml  LVIDd: 4 92 cm  LVIDs: 3 29 cm  LVLd A4C: 8 82 cm  LVLs A4C: 6 2 cm  LVOT Diam: 1 73 cm  LVPWd: 1 01 cm  RA Area: 20 57 cm2  RVIDd: 4 11 cm  SV MOD A4C: 64 8 ml  SV(Teich): 70 06 ml    CW  AV Env  Ti: 336 79 ms  AV SI: 150 47 ml/m2  AV SV: 296 42 ml  AV VTI: 51 91 cm  AV Vmax: 2 27 m/s  AV Vmean: 1 54 m/s  AV maxP 62 mmHg  AV meanP 32 mmHg  RAP: 10 mmHg  TR Vmax: 2 44 m/s  TR maxP 76 mmHg    MM  TAPSE: 2 39 cm    PW  YI (VTI): 0 78 cm2  YI Vmax: 0 89 cm2  LVOT Env  Ti: 320 65 ms  LVOT VTI: 17 15 cm  LVOT Vmax: 0 86 m/s  LVOT Vmean: 0 53 m/s  LVOT maxP 95 mmHg  LVOT meanP 44 mmHg  LVSI Dopp: 20 56 ml/m2  LVSV Dopp: 40 5 ml  RVSP: 33 76 mmHg    [facility]redited [facility]    Prepared and electronically signed by    Satinder Tucker MD  Signed 15-Feb-2019 13:55:46    No results found for this or any previous visit  This note was completed in part utilizing [employer] fluency direct voice recognition software  Grammatical errors, random word insertion, spelling mistakes, and incomplete sentences may be an occasional consequence of the system secondary to software limitations, ambient noise and hardware issues  At the time of dictation, efforts were made to edit, clarify and /or correct errors  Please read the chart carefully and recognize, using context, where substitutions have occurred    If you have any questions or concerns about the context, text or information contained within the body of this dictation, please contact myself, the provider, for further clarification

## 2021-06-29 ENCOUNTER — OFFICE VISIT (OUTPATIENT)
Dept: CARDIOLOGY CLINIC | Facility: CLINIC | Age: 76
End: 2021-06-29
Payer: COMMERCIAL

## 2021-06-29 VITALS
SYSTOLIC BLOOD PRESSURE: 126 MMHG | HEIGHT: 71 IN | WEIGHT: 155 LBS | DIASTOLIC BLOOD PRESSURE: 58 MMHG | BODY MASS INDEX: 21.7 KG/M2 | HEART RATE: 77 BPM

## 2021-06-29 DIAGNOSIS — I50.22 CHRONIC SYSTOLIC HEART FAILURE (HCC): Primary | ICD-10-CM

## 2021-06-29 DIAGNOSIS — E78.5 DYSLIPIDEMIA: Chronic | ICD-10-CM

## 2021-06-29 DIAGNOSIS — I10 ESSENTIAL HYPERTENSION: Chronic | ICD-10-CM

## 2021-06-29 DIAGNOSIS — I25.10 CORONARY ARTERY DISEASE INVOLVING NATIVE CORONARY ARTERY OF NATIVE HEART WITHOUT ANGINA PECTORIS: Chronic | ICD-10-CM

## 2021-06-29 DIAGNOSIS — Z95.3 HISTORY OF AORTIC VALVE REPLACEMENT WITH BIOPROSTHETIC VALVE: Chronic | ICD-10-CM

## 2021-06-29 DIAGNOSIS — Z95.1 HX OF CABG: ICD-10-CM

## 2021-06-29 DIAGNOSIS — I48.0 PAROXYSMAL ATRIAL FIBRILLATION (HCC): Chronic | ICD-10-CM

## 2021-06-29 PROCEDURE — 99215 OFFICE O/P EST HI 40 MIN: CPT | Performed by: NURSE PRACTITIONER

## 2021-06-29 PROCEDURE — 3078F DIAST BP <80 MM HG: CPT | Performed by: NURSE PRACTITIONER

## 2021-06-29 PROCEDURE — 3074F SYST BP LT 130 MM HG: CPT | Performed by: NURSE PRACTITIONER

## 2021-06-29 PROCEDURE — 1160F RVW MEDS BY RX/DR IN RCRD: CPT | Performed by: NURSE PRACTITIONER

## 2021-06-29 PROCEDURE — 1036F TOBACCO NON-USER: CPT | Performed by: NURSE PRACTITIONER

## 2021-06-29 PROCEDURE — 3008F BODY MASS INDEX DOCD: CPT | Performed by: NURSE PRACTITIONER

## 2021-06-29 NOTE — LETTER
June 29, 2021     Tiffanie Mosqueda, [address]  [facility]  [address] [address] [address]    Patient: Pablo Ramirez   YOB: 1945   Date of Visit: 6/29/2021       Dear Dr Curtis Flanagan: Thank you for referring Linda Holt to me for evaluation  Below are my notes for this consultation  If you have questions, please do not hesitate to call me  I look forward to following your patient along with you  Sincerely,        DUNG Bridges        CC: No Recipients  DUNG Bridges  6/29/2021  5:00 PM  Sign when Signing Visit  Cardiology  Acute  Office Visit Note     Pablo Ramirez   77 y o    male   MRN: [id number]  [facility]  [address] [id number]    PCP: Tiffanie Mosqueda DO  Cardiologist : Dr Michelle Summers  Prior: Dr [facility]            Summary of Recommendations  Low-sodium diet, Heart failure education as below  Follow up will be scheduled with Dr Michelle Summers      Impression/plan  Chronic systolic heart failure, with a drop in his EF from 60 (2/19) to 40% (9/19)  Stable  He is doing well  He is starting to gain some caloric weight back from dropping to 135 secondary to COVID  His dry weight is around 165    Wt Readings from Last 3 Encounters:   06/29/21 70 3 kg (155 lb)   06/09/21 68 6 kg (151 lb 3 2 oz)   05/27/21 63 5 kg (140 lb)         Wt Readings from Last 3 Encounters:   06/29/21 70 3 kg (155 lb)   06/09/21 68 6 kg (151 lb 3 2 oz)   05/27/21 63 5 kg (140 lb)     --Beta-blocker:   Metoprolol succinate 100 mg daily  --Diuretic:   Lasix 20 mg b i d   --2 g sodium diet, 1800 cc fluid restriction  Daily weights, call weight gain 2-3 lb in 1 day or 5 lb in 5 days  Paroxysmal atrial fibrillation, S/P Maze/TALIA ligation  Maintaining sinus rhythm, on aspirin  Coronary artery disease status post CABG, LIMA to the LAD 2014    On aspirin, statin, beta-blocker  status post bioprosthetic AVR 2014  Hypertension, essential  /58  Hyperlipidemia  On atorvastatin 40 mg/d   12/20: LDL 37  Type 2 diabetes mellitus  5/13/21; Hemoglobin A1c 7 4 on metformin  COVID-19  + 4/6/21  Roberts's esophagus/Schatzgis ring  multiple myeloma involving the eye, status post radiation  Lung nodule with adenopathy followed by Oncology at [facility]  PT/CT scan pending  Cardiac testing  ·  SPECT 2017 ( outside hosp) distal inferior ischemia  Ejection fraction 48%  Frequent PVCs  ·  EKG July 2018 incomplete left bundle branch block  · TTE 2/19  EF 60%  No RWMA  Mild biatrial enlargement  Mild MR  There was bioprosthetic aortic valve noted exhibiting normal function  There was mild TR   ·  NM myocardial SPECT 9/19 Abnormal study after pharmacologic stress without reproduction of symptoms  There was a small infarct in the apical region  Left ventricular systolic function was reduced, without distinct regional wall motion abnormalities  The calculated left ventricular ejection fraction was 40 %  Left ventricular ejection fraction was mildly decreased by visual estimate  There was mild global left ventricular hypokinesis   TTE 5/4/21  EF 40%  Moderate diffuse hypokinesis  RV mildly dilated  Mild biatrial enlargement  Mild MR  There is a bioprosthetic aortic valve exhibiting normal function  Mild TR  There was a left pleural effusion  HPI:   Jamal Roberto is a 76 yo male with coronary artery disease status post CABG,LIMA to the LAD, status post bioprosthetic aortic valve replacement, PAF status post Maze/TALIA ligation  His surgeries were performed at an outside hospital   He previously followed with Cardiology at [facility]  His ejection fraction was 60% by echo February 2019  He did undergo a nuclear stress test September 2019 showed a small infarct in the apical region  LV systolic function was reduced, to 40% with out distinct wall motion abnormalities    He has been medically managed  He also has small cell lymphoma and multiple myeloma  Adm 4/6-4/10/21  COVID + 4/1/21, after receiving a 2nd COVID vaccine several days before  Received remdesivir and IV steroids, vitamin supplements  Did not require O2 at discharge      Adm 4/15-4/20/21 pneumonia due to [place]  Hypoxic  CT: No PE  BMP nearly 8000  Treated with dexamethasone  Procalcitonin negative  On Eliquis given elevated D-dimer  Doppler negative  Discharged on Eliquis times 30 days  Discharged on home oxygen 2 L at rest 6 L with exercise        Adm 5/2--5/20/21  Acute respiratory failure with hypoxia  Possibilities likely multifactorial due to bacterial pneumonia, acute systolic heart failure, post COVID organizing pneumonia, diffuse alveolar hemorrhage  COVID testing was negative x2  He underwent bronchoscopy which showed diffuse alveolar hemorrhage  Anticoagulation was discontinued per pulmonary's recommendations  PCP results were pending at the time of discharge  He was discharged on home O2 2 5L  Discharged on antibiotics and steroids  He was not followed by Cardiology  He was diuresed with IV Lasix  He was discharged on Lasix 20 mg b i d  His potassium was elevated 5 1 suspected to be due to antibiotic therapy with Bactrim  Lisinopril was discontinued  His systolic blood pressure was also low    6/9 office visit pulmonary  No longer requiring oxygen at rest       6/11  [facility] called 5 lb weight gain  Lasix increased to 40 b i d  for few days then back down to Lasix 20 b i d       6/29/21  Follow-up visit  His weight is improved  It is still less than his dry weight of 165-168 before COVID  His dyspnea is improved  He is without oxygen  leg swelling-improved, on Lasix 20 BID  His blood pressure is satisfactory, he is in a regular rhythm by exam   He is on aspirin  He denies chest pain  Reviewed his most recent cardiac testing  He is aware of the findings        I have spent 40 minutes with Patient and family today in which greater than 50% of this time was spent in counseling/coordination of care regarding Intructions for management, Patient and family education, Importance of tx compliance and Risk factor reductions  Assessment  Diagnoses and all orders for this visit:    Chronic systolic [facility] failure ([facility] )    History of aortic valve replacement with bioprosthetic valve    Hx of CABG    Dyslipidemia    Coronary artery disease involving native coronary artery of native heart without angina pectoris    Essential hypertension    Paroxysmal atrial fibrillation (HCC)        Past Medical History:   Diagnosis Date    Arthritis     Atrial fibrillation ([facility] )     Cataract     Colitis     Colon cancer ([facility] )     Diabetes mellitus type II, non insulin dependent ([place] [address] )     Fatty liver     History of chemotherapy     History of radiation therapy     History of shingles 2018    Hypertension     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[facility] )     Pneumonia     Skin cancer        Review of Systems   Constitutional: Negative for chills  Cardiovascular: Negative for chest pain, claudication, cyanosis, dyspnea on exertion, irregular heartbeat, leg swelling, near-syncope, orthopnea, palpitations, paroxysmal nocturnal dyspnea and syncope  Respiratory: Negative for cough and shortness of breath  Gastrointestinal: Negative for heartburn and nausea  Neurological: Negative for dizziness, focal weakness, headaches, light-headedness and weakness  All other systems reviewed and are negative  Allergies   Allergen Reactions    Ceftin [Cefuroxime] Itching     However, has tolerated Cefazolin and Pip-Tazo since, which have different side chains  Be cautious with / avoid 2nd, 3rd, or 4th Gen Cephs that have similar side chains to Cefuroxime   Lantus [Insulin Glargine] Itching           Current Outpatient Medications:     aspirin 81 MG tablet, Take 1 tablet (81 mg total) by mouth daily, Disp: , Rfl:     atorvastatin (LIPITOR) 40 mg tablet, Take 1 tablet (40 mg total) by mouth daily, Disp: 90 tablet, Rfl: 3    cholecalciferol (VITAMIN D3) 1,000 units tablet, Take 2 tablets (2,000 Units total) by mouth daily, Disp: 60 tablet, Rfl: 0    ferrous sulfate 324 MG TBEC, Take 1 tablet (324 mg total) by mouth daily with breakfast, Disp: 30 tablet, Rfl: 2    furosemide (LASIX) 20 mg tablet, TAKE 1 TABLET BY MOUTH TWICE A DAY, Disp: 180 tablet, Rfl: 1    metFORMIN (GLUCOPHAGE-XR) 500 mg 24 hr tablet, TAKE 1 TABLET BY MOUTH EVERY DAY WITH DINNER, Disp: 90 tablet, Rfl: 2    metoprolol succinate (TOPROL-XL) 100 mg 24 hr tablet, TAKE 1 TABLET BY MOUTH EVERY DAY, Disp: 90 tablet, Rfl: 1    multivitamin-minerals (CENTRUM ADULTS) tablet, Take 1 tablet by mouth daily, Disp:  , Rfl: 0    omeprazole (PriLOSEC) 40 MG capsule, Take 1 capsule (40 mg total) by mouth daily, Disp: 90 capsule, Rfl: 3    saccharomyces boulardii (FLORASTOR) 250 mg capsule, Take 1 capsule (250 mg total) by mouth 2 (two) times a day, Disp: 60 capsule, Rfl: 1    nitroglycerin (NITROSTAT) 0 3 mg SL tablet, Nitrostat 0 3 mg sublingual tablet  Place 1 tablet as needed by sublingual route as needed for 90 days   (Patient not taking: Reported on 2021), Disp: , Rfl:     Social History     Socioeconomic History    Marital status: /Civil Union     Spouse name: Not on file    Number of children: Not on file    Years of education: Not on file    Highest education level: Not on file   Occupational History    Occupation: WORKING FULLTIME    Tobacco Use    Smoking status: Former Smoker     Packs/day: 1 00     Years: 40 00     Pack years: 40 00     Types: Cigarettes     Quit date:      Years since quittin 4    Smokeless tobacco: Never Used   Vaping Use    Vaping Use: Never used   Substance and Sexual Activity    Alcohol use: Yes     Comment: very rare "beer here and there"    Drug use: No    Sexual activity: Not on file   Other Topics Concern    Not on file   Social History Narrative    Not on file     Social Determinants of Health     Financial Resource Strain:     Difficulty of Paying Living Expenses:    Food Insecurity:     Worried About Running Out of Food in the Last Year:     [address] in the Last Year:    Transportation Needs:     Lack of Transportation (Medical):  Lack of Transportation (Non-Medical):    Physical Activity:     Days of Exercise per Week:     Minutes of Exercise per Session:    Stress:     Feeling of Stress :    Social Connections:     Frequency of Communication with Friends and Family:     Frequency of Social Gatherings with Friends and Family:     Attends [employer] Services:     Active Member of Clubs or Organizations:     Attends Club or Organization Meetings:     Marital Status:    Intimate Partner Violence:     Fear of Current or Ex-Partner:     Emotionally Abused:     Physically Abused:     Sexually Abused:        Family History   Problem Relation Age of Onset    Heart block Family     Coronary artery disease Mother     Thrombosis Mother     Hypertension Mother     Arthritis Mother     Hyperlipidemia Mother     Diabetes Father     Hypertension Father     Arthritis Father     Sudden death Father         cardiac    Colon cancer Paternal Grandfather     Breast cancer Sister     Heart disease Brother         Coronary stents       Physical Exam  Vitals and nursing note reviewed  Constitutional:       General: He is not in acute distress  HENT:      Head: Normocephalic and atraumatic  Eyes:      Conjunctiva/sclera: Conjunctivae normal    Cardiovascular:      Rate and Rhythm: Normal rate and regular rhythm  Pulses: Intact distal pulses  Heart sounds: Normal heart sounds  Pulmonary:      Effort: Pulmonary effort is normal       Breath sounds: Normal breath sounds  Abdominal:      General: Bowel sounds are normal       Palpations: Abdomen is soft     Musculoskeletal:         General: Normal range of motion  Cervical back: Normal range of motion and neck supple  Skin:     General: Skin is warm and dry  Neurological:      Mental Status: He is alert and oriented to person, place, and time  Vitals: Blood pressure 126/58, pulse 77, height 5' 11" (1 803 m), weight 70 3 kg (155 lb)  Wt Readings from Last 3 Encounters:   21 70 3 kg (155 lb)   21 68 6 kg (151 lb 3 2 oz)   21 63 5 kg (140 lb)         Labs & Results:  Lab Results   Component Value Date    WBC 13 01 (H) 2021    HGB 8 3 (L) 2021    HCT 27 3 (L) 2021    MCV 84 2021     2021     No results found for: BNP  No components found for: CHEM    Results for orders placed during the hospital encounter of 19    Echo complete with contrast if indicated    Narrative  [facility] [employer] [address]    Transthoracic Echocardiogram  2D, M-mode, Doppler, and Color Doppler    Study date:  15-Feb-2019    Patient: Hilario Thomas  MR number: [id number]  Account number: [de-identified]  : 1945  Age: 68 years  Gender: Male  Status: Inpatient  Location: [facility]  Height: 71 in  Weight: 170 7 lb  BP: 155/ 74 mmHg    Indications: Chest pain/Pressure/ Tightness    Diagnoses: R07 9 - Chest pain, unspecified    Sonographer:  SOREN Fraser  Primary Physician:  Chani Schulz DO  Referring Physician:  Iza Sen DO  Group:  [id number]  Interpreting Physician:  Marisol Cruz MD    SUMMARY    LEFT VENTRICLE:  Systolic function was normal by visual assessment  Ejection fraction was estimated to be 60 %  There were no regional wall motion abnormalities  LEFT ATRIUM:  The atrium was mildly dilated  RIGHT ATRIUM:  The atrium was mildly dilated  MITRAL VALVE:  There was moderate annular calcification  There was mild regurgitation  AORTIC VALVE:  A bioprosthesis was present   It exhibited normal function  TRICUSPID VALVE:  There was mild regurgitation  HISTORY: PRIOR HISTORY: F  smoker; DM2; Bioprosthetic AV- Bovine; CAD; HTN; HLD; Afib; CABG; Anemia; Pleural Eff ; CHF    PROCEDURE: The study was performed in the [facility]  This was a routine study  The transthoracic approach was used  The study included complete 2D imaging, M-mode, complete spectral Doppler, and color Doppler  Echocardiographic views were limited due to decreased penetration and lung interference  This was a technically difficult study  LEFT VENTRICLE: Size was normal  Systolic function was normal by visual assessment  Ejection fraction was estimated to be 60 %  There were no regional wall motion abnormalities  Wall thickness was normal  DOPPLER: Transmitral flow pattern:  atrial fibrillation  RIGHT VENTRICLE: The size was normal  Systolic function was normal  DOPPLER: Systolic pressure was not estimated  LEFT ATRIUM: The atrium was mildly dilated  RIGHT ATRIUM: The atrium was mildly dilated  MITRAL VALVE: There was moderate annular calcification  Valve structure was normal  There was normal leaflet separation  DOPPLER: The transmitral velocity was within the normal range  There was no evidence for stenosis  There was mild  regurgitation  AORTIC VALVE: A bioprosthesis was present  It exhibited normal function  DOPPLER: Transaortic velocity was within the normal range  There was no evidence for stenosis  TRICUSPID VALVE: The valve structure was normal  There was normal leaflet separation  DOPPLER: The transtricuspid velocity was within the normal range  There was no evidence for stenosis  There was mild regurgitation  PULMONIC VALVE: Leaflets exhibited normal thickness, no calcification, and normal cuspal separation  DOPPLER: The transpulmonic velocity was within the normal range  There was no regurgitation  PERICARDIUM: There was no pericardial effusion      AORTA: The root exhibited normal size  SYSTEMIC VEINS: IVC: The inferior vena cava was normal in size and course  Respirophasic changes were normal     SYSTEM MEASUREMENT TABLES    2D  %FS: 33 1 %  AV Diam: 2 7 cm  EDV(Teich): 113 92 ml  EF(Teich): 61 5 %  ESV(Teich): 43 87 ml  IVSd: 0 81 cm  LA Area: 22 3 cm2  LA Diam: 4 57 cm  LVEDV MOD A4C: 104 15 ml  LVEF MOD A4C: 62 22 %  LVESV MOD A4C: 39 35 ml  LVIDd: 4 92 cm  LVIDs: 3 29 cm  LVLd A4C: 8 82 cm  LVLs A4C: 6 2 cm  LVOT Diam: 1 73 cm  LVPWd: 1 01 cm  RA Area: 20 57 cm2  RVIDd: 4 11 cm  SV MOD A4C: 64 8 ml  SV(Teich): 70 06 ml    CW  AV Env  Ti: 336 79 ms  AV SI: 150 47 ml/m2  AV SV: 296 42 ml  AV VTI: 51 91 cm  AV Vmax: 2 27 m/s  AV Vmean: 1 54 m/s  AV maxP 62 mmHg  AV meanP 32 mmHg  RAP: 10 mmHg  TR Vmax: 2 44 m/s  TR maxP 76 mmHg    MM  TAPSE: 2 39 cm    PW  YI (VTI): 0 78 cm2  YI Vmax: 0 89 cm2  LVOT Env  Ti: 320 65 ms  LVOT VTI: 17 15 cm  LVOT Vmax: 0 86 m/s  LVOT Vmean: 0 53 m/s  LVOT maxP 95 mmHg  LVOT meanP 44 mmHg  LVSI Dopp: 20 56 ml/m2  LVSV Dopp: 40 5 ml  RVSP: 33 76 mmHg    [facility]    Prepared and electronically signed by    Mao Haq MD  Signed 15-Feb-2019 13:55:46    No results found for this or any previous visit  This note was completed in part utilizing [employer] direct voice recognition software  Grammatical errors, random word insertion, spelling mistakes, and incomplete sentences may be an occasional consequence of the system secondary to software limitations, ambient noise and hardware issues  At the time of dictation, efforts were made to edit, clarify and /or correct errors  Please read the chart carefully and recognize, using context, where substitutions have occurred    If you have any questions or concerns about the context, text or information contained within the body of this dictation, please contact myself, the provider, for further clarification

## 2021-06-30 ENCOUNTER — TELEPHONE (OUTPATIENT)
Dept: PULMONOLOGY | Facility: CLINIC | Age: 76
End: 2021-06-30

## 2021-06-30 NOTE — TELEPHONE ENCOUNTER
Spoke with patient message related overnight pulse ox showed desaturation to continue with nocturnal O2

## 2021-06-30 NOTE — TELEPHONE ENCOUNTER
----- Message from Dorothy Garg MD sent at 6/30/2021  1:36 PM EDT -----  I reviewed your overnight pulse oximetry  There were periods of significant desaturation  Recommend continuing supplemental oxygen overnight

## 2021-07-07 LAB
MYCOBACTERIUM SPEC CULT: NORMAL
RHODAMINE-AURAMINE STN SPEC: NORMAL

## 2021-07-14 LAB
EST. AVERAGE GLUCOSE BLD GHB EST-MCNC: 105 MG/DL
GLUCOSE SERPL-MCNC: 98 MG/DL (ref 65–99)
HBA1C MFR BLD: 5.3 % (ref 4.8–5.6)

## 2021-07-14 PROCEDURE — 3044F HG A1C LEVEL LT 7.0%: CPT | Performed by: NURSE PRACTITIONER

## 2021-07-19 ENCOUNTER — OFFICE VISIT (OUTPATIENT)
Dept: FAMILY MEDICINE CLINIC | Facility: HOSPITAL | Age: 76
End: 2021-07-19
Payer: COMMERCIAL

## 2021-07-19 VITALS
SYSTOLIC BLOOD PRESSURE: 132 MMHG | WEIGHT: 159 LBS | DIASTOLIC BLOOD PRESSURE: 78 MMHG | HEART RATE: 70 BPM | TEMPERATURE: 97.6 F | HEIGHT: 71 IN | BODY MASS INDEX: 22.26 KG/M2

## 2021-07-19 DIAGNOSIS — I10 ESSENTIAL HYPERTENSION: Chronic | ICD-10-CM

## 2021-07-19 DIAGNOSIS — E78.5 DYSLIPIDEMIA: ICD-10-CM

## 2021-07-19 DIAGNOSIS — E11.9 TYPE 2 DIABETES MELLITUS WITHOUT COMPLICATION, WITHOUT LONG-TERM CURRENT USE OF INSULIN (HCC): Primary | Chronic | ICD-10-CM

## 2021-07-19 DIAGNOSIS — Z12.5 PROSTATE CANCER SCREENING: ICD-10-CM

## 2021-07-19 DIAGNOSIS — I25.119 CORONARY ARTERY DISEASE INVOLVING NATIVE HEART WITH ANGINA PECTORIS, UNSPECIFIED VESSEL OR LESION TYPE (HCC): ICD-10-CM

## 2021-07-19 DIAGNOSIS — J96.01 ACUTE RESPIRATORY FAILURE WITH HYPOXIA (HCC): ICD-10-CM

## 2021-07-19 DIAGNOSIS — I50.21 ACUTE SYSTOLIC CONGESTIVE HEART FAILURE (HCC): ICD-10-CM

## 2021-07-19 DIAGNOSIS — I48.0 PAROXYSMAL ATRIAL FIBRILLATION (HCC): ICD-10-CM

## 2021-07-19 PROCEDURE — 3078F DIAST BP <80 MM HG: CPT | Performed by: INTERNAL MEDICINE

## 2021-07-19 PROCEDURE — 1160F RVW MEDS BY RX/DR IN RCRD: CPT | Performed by: INTERNAL MEDICINE

## 2021-07-19 PROCEDURE — 3075F SYST BP GE 130 - 139MM HG: CPT | Performed by: INTERNAL MEDICINE

## 2021-07-19 PROCEDURE — 99214 OFFICE O/P EST MOD 30 MIN: CPT | Performed by: INTERNAL MEDICINE

## 2021-07-19 PROCEDURE — 1036F TOBACCO NON-USER: CPT | Performed by: INTERNAL MEDICINE

## 2021-07-19 PROCEDURE — 3008F BODY MASS INDEX DOCD: CPT | Performed by: INTERNAL MEDICINE

## 2021-07-19 NOTE — PROGRESS NOTES
Assessment/Plan:    Type 2 diabetes mellitus without complication, without long-term current use of insulin (HCC)    Lab Results   Component Value Date    HGBA1C 5 3 07/13/2021   DM type 2 without complications - controlled with A1C 5 3 - con't current Metformin, con't current healthy diet and keep active, recheck BW in 6 mos (order given), if A1C again < 6 5 will stop Metformin, foot exam done today, has eye exam coming up (7/19- 2 yrs), on ASA and statin but no ACE/ARB, will follow    Acute respiratory failure with hypoxia (HCC)  Minimal symptoms during day but still with desaturations at night, con't O2 qhs and f/u as per Pulm, call with new/worse symptoms    Acute systolic congestive heart failure ([facility] [place] [address] )  Wt Readings from Last 3 Encounters:   07/19/21 72 1 kg (159 lb)   06/29/21 70 3 kg (155 lb)   06/09/21 68 6 kg (151 lb 3 2 oz)     Had recent bump up in wgt and LE edema - lasix increased for short time and now back to routine lasix, con't elevation of LE at rest and avoid sodium in diet, call with new/worse CV symptoms        Coronary artery disease involving native heart with angina pectoris, unspecified vessel or lesion type (HCC)  Currently w/o CV s/sx, on ASA/Metoprolol/statin, not on ACE/ARB, con't meds and f/u as per Cardio, will follow    Essential hypertension  Bp at goal, con't current meds    Paroxysmal atrial fibrillation (HCC)  Currently NSR and rate controlled with Toprol, on ASA for stroke prevention, will follow       Diagnoses and all orders for this visit:    Type 2 diabetes mellitus without complication, without long-term current use of insulin (HCC)  -     CBC and differential; Future  -     Comprehensive metabolic panel; Future  -     Hemoglobin A1C; Future  -     Lipid panel; Future  -     Microalbumin / creatinine urine ratio; Future  -     TSH, 3rd generation with Free T4 reflex;  Future  -     CBC and differential  -     Comprehensive metabolic panel  -     Hemoglobin A1C  -     Lipid panel  -     Microalbumin / creatinine urine ratio  -     TSH, 3rd generation with Free T4 reflex    Acute respiratory failure with hypoxia (HCC)  -     CBC and differential; Future  -     Comprehensive metabolic panel; Future  -     Hemoglobin A1C; Future  -     Lipid panel; Future  -     Microalbumin / creatinine urine ratio; Future  -     TSH, 3rd generation with Free T4 reflex; Future  -     CBC and differential  -     Comprehensive metabolic panel  -     Hemoglobin A1C  -     Lipid panel  -     Microalbumin / creatinine urine ratio  -     TSH, 3rd generation with Free T4 reflex    Acute systolic congestive heart failure (HCC)  -     CBC and differential; Future  -     Comprehensive metabolic panel; Future  -     Hemoglobin A1C; Future  -     Lipid panel; Future  -     Microalbumin / creatinine urine ratio; Future  -     TSH, 3rd generation with Free T4 reflex; Future  -     CBC and differential  -     Comprehensive metabolic panel  -     Hemoglobin A1C  -     Lipid panel  -     Microalbumin / creatinine urine ratio  -     TSH, 3rd generation with Free T4 reflex    Essential hypertension  -     CBC and differential; Future  -     Comprehensive metabolic panel; Future  -     Hemoglobin A1C; Future  -     Lipid panel; Future  -     Microalbumin / creatinine urine ratio; Future  -     TSH, 3rd generation with Free T4 reflex; Future  -     CBC and differential  -     Comprehensive metabolic panel  -     Hemoglobin A1C  -     Lipid panel  -     Microalbumin / creatinine urine ratio  -     TSH, 3rd generation with Free T4 reflex    Dyslipidemia  -     Lipid panel; Future  -     Lipid panel    Paroxysmal atrial fibrillation (HCC)  -     TSH, 3rd generation with Free T4 reflex; Future  -     TSH, 3rd generation with Free T4 reflex    Prostate cancer screening  -     PSA Total (Reflex To Free);  Future  -     PSA Total (Reflex To Free)    Coronary artery disease involving native heart with angina pectoris, unspecified vessel or lesion type [facility])      Colonoscopy 12/20 - 5 yrs        Subjective:      Patient ID: Jayne Maldonado is a 77 y o  male  HPI Pt here for follow up appt and BW results    BW results were d/w pt in detail: FBS/A1C 98/5 3  Def of controlled vs uncontrolled DM was reviewed  Diet was reviewed - wgt down 14 lbs from Jan 2021  He is taking his DM meds as directed  He checks his sugars at home but not daily now - average BS is around 120's  He is UTD on foot (9/20) and eye exam is due this month (7/19 - 2 yrs)  He denies sores ulcers or pain with his feet  He denies sores/ulcers  He states he has an eye exam coming up  He is on statin and ASA but no ACE/ARB  Pt saw Pulm (Dr Felipe White) in June for f/u chronic hypoxemic resp failure and known lung nodule- OV note reviewed  Discussion was had about D/Cing oxygen  He had overnight pulse oximetry done and had periods of significant desaturation  It was recommended he con't supplemental oxygen overnight  He is still using the O2 at night only  He notes no significant SOB during the day unless he does a lot of activity  He notes no cough/wheezing/F/C  He has appt with Onc tomorrow and is likely going to have PET scan soon  Pt called Cardio mid June with increase in wgt  His  Lasix was increased for a short period of time and BW was monitored  Pt saw Liliana Barrera for follow up - OV note reviewed  He notes no Cp/palp/orthopnea/PND  His LE edema has resolved and wgt is stable  He does check his wgt daily  BP at goal today and meds were reviewed and no changes have occurred  He denies missing doses of meds or SE with the meds  He does not check his BP outside the office  He notes no frequent Ha's/dizziness/double vision/CP  Colonoscopy 12/20 - 5 yrs      Review of Systems   Constitutional: Positive for fatigue  Negative for chills and fever  HENT: Negative for congestion and sinus pain      Eyes: Negative for pain and visual disturbance  Respiratory: Positive for shortness of breath  Negative for cough and wheezing  Cardiovascular: Negative for chest pain, palpitations and leg swelling  Gastrointestinal: Negative for abdominal pain, blood in stool, constipation, diarrhea, nausea and vomiting  Genitourinary: Negative for difficulty urinating and dysuria  Musculoskeletal: Positive for arthralgias  Negative for myalgias  Skin: Negative for rash and wound  Neurological: Negative for dizziness, numbness and headaches  Hematological: Bruises/bleeds easily  Psychiatric/Behavioral: Negative for behavioral problems and confusion  Objective:    /78   Pulse 70   Temp 97 6 °F (36 4 °C)   Ht 5' 11" (1 803 m)   Wt 72 1 kg (159 lb)   BMI 22 18 kg/m²      Physical Exam  Vitals and nursing note reviewed  Constitutional:       General: He is not in acute distress  Appearance: He is well-developed  He is not ill-appearing  HENT:      Head: Normocephalic and atraumatic  Eyes:      General:         Right eye: No discharge  Left eye: No discharge  Conjunctiva/sclera: Conjunctivae normal    Neck:      Trachea: No tracheal deviation  Cardiovascular:      Rate and Rhythm: Normal rate and regular rhythm  Pulses: no weak pulses          Dorsalis pedis pulses are 1+ on the right side and 2+ on the left side  Heart sounds: Murmur heard  Pulmonary:      Effort: Pulmonary effort is normal  No respiratory distress  Breath sounds: Normal breath sounds  No wheezing, rhonchi or rales  Abdominal:      General: There is no distension  Palpations: Abdomen is soft  Tenderness: There is no abdominal tenderness  There is no guarding or rebound  Musculoskeletal:         General: No deformity or signs of injury  Cervical back: Neck supple  Feet:      Right foot:      Skin integrity: Dry skin present  No ulcer, skin breakdown, erythema, warmth or callus        Left foot: Skin integrity: Dry skin present  No ulcer, skin breakdown, erythema, warmth or callus  Skin:     General: Skin is warm and dry  Coloration: Skin is not pale  Findings: No rash  Neurological:      General: No focal deficit present  Mental Status: He is alert  Motor: No abnormal muscle tone  Gait: Gait normal    Psychiatric:         Mood and Affect: Mood normal          Behavior: Behavior normal          Thought Content: Thought content normal          Judgment: Judgment normal        Patient's shoes and socks removed  Right Foot/Ankle   Right Foot Inspection  Skin Exam: skin normal, skin intact and dry skin no warmth, no callus, no erythema, no maceration, no abnormal color, no pre-ulcer, no ulcer and no callus                          Toe Exam: ROM and strength within normal limits  Sensory   Vibration: intact    Monofilament testing: intact  Vascular    The right DP pulse is 1+  Left Foot/Ankle  Left Foot Inspection  Skin Exam: skin normal, skin intact and dry skinno warmth, no erythema, no maceration, normal color, no pre-ulcer, no ulcer and no callus                         Toe Exam: ROM and strength within normal limits                   Sensory   Vibration: intact    Monofilament: intact  Vascular    The left DP pulse is 2+  Assign Risk Category:  No deformity present; No loss of protective sensation;  No weak pulses       Risk: 0

## 2021-07-19 NOTE — ASSESSMENT & PLAN NOTE
Currently w/o CV s/sx, on ASA/Metoprolol/statin, not on ACE/ARB, con't meds and f/u as per Cardio, will follow

## 2021-07-19 NOTE — ASSESSMENT & PLAN NOTE
Lab Results   Component Value Date    HGBA1C 5 3 07/13/2021   DM type 2 without complications - controlled with A1C 5 3 - con't current Metformin, con't current healthy diet and keep active, recheck BW in 6 mos (order given), if A1C again < 6 5 will stop Metformin, foot exam done today, has eye exam coming up (7/19- 2 yrs), on ASA and statin but no ACE/ARB, will follow

## 2021-07-19 NOTE — ASSESSMENT & PLAN NOTE
Wt Readings from Last 3 Encounters:   07/19/21 72 1 kg (159 lb)   06/29/21 70 3 kg (155 lb)   06/09/21 68 6 kg (151 lb 3 2 oz)     Had recent bump up in wgt and LE edema - lasix increased for short time and now back to routine lasix, con't elevation of LE at rest and avoid sodium in diet, call with new/worse CV symptoms

## 2021-07-19 NOTE — ASSESSMENT & PLAN NOTE
Minimal symptoms during day but still with desaturations at night, con't O2 qhs and f/u as per Pulm, call with new/worse symptoms

## 2021-08-12 DIAGNOSIS — J18.9 HCAP (HEALTHCARE-ASSOCIATED PNEUMONIA): ICD-10-CM

## 2021-08-12 RX ORDER — FERROUS SULFATE 324(65)MG
324 TABLET, DELAYED RELEASE (ENTERIC COATED) ORAL
Qty: 30 TABLET | Refills: 2 | Status: ON HOLD | OUTPATIENT
Start: 2021-08-12 | End: 2022-04-22 | Stop reason: SDUPTHER

## 2021-08-13 ENCOUNTER — TELEPHONE (OUTPATIENT)
Dept: FAMILY MEDICINE CLINIC | Facility: HOSPITAL | Age: 76
End: 2021-08-13

## 2021-08-13 NOTE — TELEPHONE ENCOUNTER
Recommendations for immunization for immunocompromised was just approved this am - I do not yet know how [facility] is going about this - can contact the COVID vaccine number and see if they have a plan

## 2021-08-18 DIAGNOSIS — I25.10 CORONARY ARTERY DISEASE INVOLVING NATIVE CORONARY ARTERY OF NATIVE HEART WITHOUT ANGINA PECTORIS: ICD-10-CM

## 2021-08-18 RX ORDER — ATORVASTATIN CALCIUM 40 MG/1
TABLET, FILM COATED ORAL
Qty: 90 TABLET | Refills: 3 | Status: SHIPPED | OUTPATIENT
Start: 2021-08-18 | End: 2022-08-04

## 2021-09-02 ENCOUNTER — OFFICE VISIT (OUTPATIENT)
Dept: FAMILY MEDICINE CLINIC | Facility: HOSPITAL | Age: 76
End: 2021-09-02
Payer: COMMERCIAL

## 2021-09-02 VITALS
HEIGHT: 71 IN | TEMPERATURE: 96.3 F | DIASTOLIC BLOOD PRESSURE: 52 MMHG | HEART RATE: 60 BPM | BODY MASS INDEX: 22.15 KG/M2 | SYSTOLIC BLOOD PRESSURE: 126 MMHG | WEIGHT: 158.2 LBS

## 2021-09-02 DIAGNOSIS — K14.6 PAINFUL TONGUE: ICD-10-CM

## 2021-09-02 DIAGNOSIS — L60.9 NAIL DISORDER: ICD-10-CM

## 2021-09-02 DIAGNOSIS — C85.90 LYMPHOMA, UNSPECIFIED BODY REGION, UNSPECIFIED LYMPHOMA TYPE (HCC): Chronic | ICD-10-CM

## 2021-09-02 DIAGNOSIS — K14.5 TONGUE, FISSURED: Primary | ICD-10-CM

## 2021-09-02 PROCEDURE — 1036F TOBACCO NON-USER: CPT | Performed by: INTERNAL MEDICINE

## 2021-09-02 PROCEDURE — 1160F RVW MEDS BY RX/DR IN RCRD: CPT | Performed by: INTERNAL MEDICINE

## 2021-09-02 PROCEDURE — 3074F SYST BP LT 130 MM HG: CPT | Performed by: INTERNAL MEDICINE

## 2021-09-02 PROCEDURE — 3078F DIAST BP <80 MM HG: CPT | Performed by: INTERNAL MEDICINE

## 2021-09-02 PROCEDURE — 99214 OFFICE O/P EST MOD 30 MIN: CPT | Performed by: INTERNAL MEDICINE

## 2021-09-02 PROCEDURE — 3008F BODY MASS INDEX DOCD: CPT | Performed by: INTERNAL MEDICINE

## 2021-09-02 NOTE — PROGRESS NOTES
Assessment/Plan:    Lymphoma ([facility] [address] )  D/w pt that submandibular adenopathy likely d/t current oral symptoms but with h/o lymphoma needs to be watched, will check CBC and monitor, should d/w Onc if not better at appt in Oct, is having PET-CT in Oct, will follow       Diagnoses and all orders for this visit:    Tongue, fissured  Comments:  D/w pt that not typical apperance of thrush and more concerning for vitamin def, will check BW and a rx for magic mouthwash was sent, will follow, call with new/worse symptoms or with any difficulty swallowing  Orders:  -     CBC and differential  -     Vitamin B12  -     Folate  -     Vitamin B2; Future  -     Zinc; Future  -     Iron; Future  -     Ferritin; Future  -     TIBC; Future  -     Methylmalonic acid, serum; Future  -     al mag oxide-diphenhydramine-lidocaine viscous (MAGIC MOUTHWASH) 1:1:1 suspension; Swish and spit 10 mL every 6 (six) hours as needed for mouth pain or discomfort  -     Vitamin B2  -     Zinc  -     Iron  -     Ferritin  -     TIBC  -     Methylmalonic acid, serum  -     Vitamin D 25 hydroxy; Future  -     Vitamin D 25 hydroxy    Painful tongue  Comments:  Again less c/w thrush and more with glossitis/vitamin def, check BW and will follow, soft/bland diet and avoid spicey/hot foods, call with new/worse symptoms  Orders:  -     CBC and differential  -     Vitamin B12  -     Folate  -     Vitamin B2; Future  -     Zinc; Future  -     Iron; Future  -     Ferritin; Future  -     TIBC; Future  -     Methylmalonic acid, serum; Future  -     al mag oxide-diphenhydramine-lidocaine viscous (MAGIC MOUTHWASH) 1:1:1 suspension; Swish and spit 10 mL every 6 (six) hours as needed for mouth pain or discomfort  -     Vitamin B2  -     Zinc  -     Iron  -     Ferritin  -     TIBC  -     Methylmalonic acid, serum  -     Vitamin D 25 hydroxy;  Future  -     Vitamin D 25 hydroxy    Nail disorder  Comments:  Poss also d/t vitamin def - order for BW given, call with any s/sx of infection or new/worse symptoms  Orders:  -     Vitamin D 25 hydroxy; Future  -     Vitamin D 25 hydroxy    Lymphoma, unspecified body region, unspecified lymphoma type ([facility] [address] )      Colonoscopy 12/20 - 5 yrs      DM labs 7/21 (A1C 5 3)  DM foot exam 7/21  DM eye exam - overdue      Subjective:      Patient ID: Jaime June is a 77 y o  male  HPI Pt here with mult medical concerns    He notes a few weeks he has had pain at the tongue  Pain is constant but worse with eating or drinking  He notes no pain or discomfort with swallowing  He is not aware of a coating on the tongue or of a specific discoloration  He had something in the hospital (magic mouthwash sounded familiar when asked) and felt it helped  He notes no recent abx  He started to note some swollen glands under his neck a few days ago but yesterday they started getting painful  He noted dry skn and tips of nails appear more white and has peeling at finger tips  He notes no pain with the fingers  He has been using lotion on hands with some benefit  He tried OTC mouthwash w/o great benefit  He notes no F/C/unintentinal wgt loss/night sweats  He states his appetite is good  Last CBC May 2021  Saw Onc in July 2021  Has a PET-CT and BW for Oct        Colonoscopy 12/20 - 5 yrs      DM labs 7/21 (A1C 5 3)  DM foot exam 7/21  DM eye exam - overdue    Review of Systems   Constitutional: Negative for appetite change, chills, fever and unexpected weight change  HENT: Negative for congestion, mouth sores, trouble swallowing and voice change  Eyes: Negative for pain and visual disturbance  Respiratory: Negative for cough and shortness of breath  Cardiovascular: Negative for chest pain and palpitations  Gastrointestinal: Negative for abdominal pain, diarrhea, nausea and vomiting  Genitourinary: Negative for difficulty urinating and dysuria  Skin: Negative for rash and wound  Neurological: Negative for dizziness and headaches  Hematological: Positive for adenopathy  Does not bruise/bleed easily  Psychiatric/Behavioral: Negative for behavioral problems and confusion  Objective:    /52   Pulse 60   Temp (!) 96 3 °F (35 7 °C) (Tympanic)   Ht 5' 11" (1 803 m)   Wt 71 8 kg (158 lb 3 2 oz)   BMI 22 06 kg/m²      Physical Exam  Vitals and nursing note reviewed  Constitutional:       General: He is not in acute distress  Appearance: He is well-developed  Comments: Chronically ill appearing, thin   HENT:      Head: Normocephalic and atraumatic  Mouth/Throat:      Mouth: Mucous membranes are moist       Pharynx: No oropharyngeal exudate or posterior oropharyngeal erythema  Comments: Tongue without coating/discoloration but has loss of papilla's and fissures with mild superficial cracking  Eyes:      General:         Right eye: No discharge  Left eye: No discharge  Conjunctiva/sclera: Conjunctivae normal    Cardiovascular:      Rate and Rhythm: Normal rate and regular rhythm  Heart sounds: Murmur heard  Pulmonary:      Effort: Pulmonary effort is normal  No respiratory distress  Breath sounds: Normal breath sounds  No wheezing, rhonchi or rales  Abdominal:      General: Abdomen is flat  There is no distension  Palpations: Abdomen is soft  Tenderness: There is no abdominal tenderness  There is no guarding or rebound  Musculoskeletal:         General: No deformity or signs of injury  Lymphadenopathy:      Cervical: Cervical adenopathy present  Skin:     General: Skin is warm and dry  Coloration: Skin is not pale  Findings: No rash  Comments: Fingertips with dry peeling skin, nails dystrophic at tips at all digits except R 1st digit, toes/heels/foot in general also peeling but no erythema/warmth/drainage noted   Neurological:      General: No focal deficit present  Mental Status: He is alert  Mental status is at baseline        Gait: Gait normal  Psychiatric:         Mood and Affect: Mood normal          Behavior: Behavior normal          Thought Content:  Thought content normal          Judgment: Judgment normal

## 2021-09-02 NOTE — ASSESSMENT & PLAN NOTE
D/w pt that submandibular adenopathy likely d/t current oral symptoms but with h/o lymphoma needs to be watched, will check CBC and monitor, should d/w Onc if not better at appt in Oct, is having PET-CT in Oct, will follow

## 2021-09-03 LAB — 25(OH)D3+25(OH)D2 SERPL-MCNC: 49.9 NG/ML (ref 30–100)

## 2021-09-09 ENCOUNTER — TELEPHONE (OUTPATIENT)
Dept: FAMILY MEDICINE CLINIC | Facility: HOSPITAL | Age: 76
End: 2021-09-09

## 2021-09-09 LAB
BASOPHILS # BLD AUTO: 0.1 X10E3/UL (ref 0–0.2)
BASOPHILS NFR BLD AUTO: 1 %
EOSINOPHIL # BLD AUTO: 0.3 X10E3/UL (ref 0–0.4)
EOSINOPHIL NFR BLD AUTO: 4 %
ERYTHROCYTE [DISTWIDTH] IN BLOOD BY AUTOMATED COUNT: 15.2 % (ref 11.6–15.4)
FERRITIN SERPL-MCNC: 54 NG/ML (ref 30–400)
FOLATE SERPL-MCNC: >20 NG/ML
HCT VFR BLD AUTO: 34.4 % (ref 37.5–51)
HGB BLD-MCNC: 10.9 G/DL (ref 13–17.7)
IMM GRANULOCYTES # BLD: 0 X10E3/UL (ref 0–0.1)
IMM GRANULOCYTES NFR BLD: 0 %
IRON SATN MFR SERPL: 12 % (ref 15–55)
IRON SERPL-MCNC: 37 UG/DL (ref 38–169)
LYMPHOCYTES # BLD AUTO: 2 X10E3/UL (ref 0.7–3.1)
LYMPHOCYTES NFR BLD AUTO: 24 %
MCH RBC QN AUTO: 25.2 PG (ref 26.6–33)
MCHC RBC AUTO-ENTMCNC: 31.7 G/DL (ref 31.5–35.7)
MCV RBC AUTO: 79 FL (ref 79–97)
METHYLMALONATE SERPL-SCNC: 275 NMOL/L (ref 0–378)
MONOCYTES # BLD AUTO: 0.7 X10E3/UL (ref 0.1–0.9)
MONOCYTES NFR BLD AUTO: 8 %
NEUTROPHILS # BLD AUTO: 5.3 X10E3/UL (ref 1.4–7)
NEUTROPHILS NFR BLD AUTO: 63 %
PLATELET # BLD AUTO: 283 X10E3/UL (ref 150–450)
RBC # BLD AUTO: 4.33 X10E6/UL (ref 4.14–5.8)
SL AMB DISCLAIMER: NORMAL
TIBC SERPL-MCNC: 314 UG/DL (ref 250–450)
UIBC SERPL-MCNC: 277 UG/DL (ref 111–343)
VIT B12 SERPL-MCNC: 822 PG/ML (ref 232–1245)
VIT B2 BLD-MCNC: 349 UG/L (ref 137–370)
WBC # BLD AUTO: 8.4 X10E3/UL (ref 3.4–10.8)
ZINC SERPL-MCNC: 77 UG/DL (ref 44–115)

## 2021-09-09 NOTE — TELEPHONE ENCOUNTER
Having cataract surgery at the end of the month  Will you be able to fill out the form or do you need to see him?   PCB

## 2021-09-09 NOTE — TELEPHONE ENCOUNTER
With his cardiac history as well as diabetes  he needs a 40 min Preoperative physical appt as his last appt was purely a sick/problem visit

## 2021-09-21 ENCOUNTER — OFFICE VISIT (OUTPATIENT)
Dept: FAMILY MEDICINE CLINIC | Facility: HOSPITAL | Age: 76
End: 2021-09-21
Payer: COMMERCIAL

## 2021-09-21 VITALS
HEIGHT: 71 IN | WEIGHT: 161.4 LBS | SYSTOLIC BLOOD PRESSURE: 100 MMHG | TEMPERATURE: 97.1 F | HEART RATE: 62 BPM | DIASTOLIC BLOOD PRESSURE: 48 MMHG | BODY MASS INDEX: 22.6 KG/M2

## 2021-09-21 DIAGNOSIS — I48.0 PAROXYSMAL ATRIAL FIBRILLATION (HCC): Chronic | ICD-10-CM

## 2021-09-21 DIAGNOSIS — E11.9 TYPE 2 DIABETES MELLITUS WITHOUT COMPLICATION, WITHOUT LONG-TERM CURRENT USE OF INSULIN (HCC): Chronic | ICD-10-CM

## 2021-09-21 DIAGNOSIS — J96.01 ACUTE RESPIRATORY FAILURE WITH HYPOXIA (HCC): ICD-10-CM

## 2021-09-21 DIAGNOSIS — I50.22 CHRONIC SYSTOLIC CONGESTIVE HEART FAILURE (HCC): ICD-10-CM

## 2021-09-21 DIAGNOSIS — C90.00 MULTIPLE MYELOMA NOT HAVING ACHIEVED REMISSION (HCC): ICD-10-CM

## 2021-09-21 DIAGNOSIS — I65.23 ATHEROSCLEROSIS OF BOTH CAROTID ARTERIES: ICD-10-CM

## 2021-09-21 DIAGNOSIS — I25.119 CORONARY ARTERY DISEASE INVOLVING NATIVE HEART WITH ANGINA PECTORIS, UNSPECIFIED VESSEL OR LESION TYPE (HCC): ICD-10-CM

## 2021-09-21 DIAGNOSIS — Z23 ENCOUNTER FOR IMMUNIZATION: ICD-10-CM

## 2021-09-21 DIAGNOSIS — Z01.818 PREOPERATIVE CLEARANCE: Primary | ICD-10-CM

## 2021-09-21 DIAGNOSIS — I10 ESSENTIAL HYPERTENSION: Chronic | ICD-10-CM

## 2021-09-21 DIAGNOSIS — H25.9 SENILE CATARACT OF RIGHT EYE, UNSPECIFIED AGE-RELATED CATARACT TYPE: ICD-10-CM

## 2021-09-21 PROBLEM — U07.1 PNEUMONIA DUE TO COVID-19 VIRUS: Status: RESOLVED | Noted: 2021-04-07 | Resolved: 2021-09-21

## 2021-09-21 PROBLEM — J12.82 PNEUMONIA DUE TO COVID-19 VIRUS: Status: RESOLVED | Noted: 2021-04-07 | Resolved: 2021-09-21

## 2021-09-21 PROBLEM — R04.89 PULMONARY ALVEOLAR HEMORRHAGE: Status: RESOLVED | Noted: 2021-05-15 | Resolved: 2021-09-21

## 2021-09-21 PROCEDURE — 1101F PT FALLS ASSESS-DOCD LE1/YR: CPT | Performed by: INTERNAL MEDICINE

## 2021-09-21 PROCEDURE — 1160F RVW MEDS BY RX/DR IN RCRD: CPT | Performed by: INTERNAL MEDICINE

## 2021-09-21 PROCEDURE — 90662 IIV NO PRSV INCREASED AG IM: CPT

## 2021-09-21 PROCEDURE — 3008F BODY MASS INDEX DOCD: CPT | Performed by: INTERNAL MEDICINE

## 2021-09-21 PROCEDURE — 1036F TOBACCO NON-USER: CPT | Performed by: INTERNAL MEDICINE

## 2021-09-21 PROCEDURE — 3074F SYST BP LT 130 MM HG: CPT | Performed by: INTERNAL MEDICINE

## 2021-09-21 PROCEDURE — G0008 ADMIN INFLUENZA VIRUS VAC: HCPCS

## 2021-09-21 PROCEDURE — 99214 OFFICE O/P EST MOD 30 MIN: CPT | Performed by: INTERNAL MEDICINE

## 2021-09-21 PROCEDURE — 3078F DIAST BP <80 MM HG: CPT | Performed by: INTERNAL MEDICINE

## 2021-09-21 PROCEDURE — 3725F SCREEN DEPRESSION PERFORMED: CPT | Performed by: INTERNAL MEDICINE

## 2021-09-21 PROCEDURE — 3288F FALL RISK ASSESSMENT DOCD: CPT | Performed by: INTERNAL MEDICINE

## 2021-09-21 NOTE — PROGRESS NOTES
Subjective:     Mark Allen is a 77 y o  male who presents to the office today for a preoperative consultation at the request of surgeon Dr Leila Conklin who plans on performing R cataract surgery on September 27  Prior anesthesia adverse reactions - None    Exercise capacity - Able to walk 4 blocks or climb 2 flights of stairs without symptoms - Yes    Easy bleeding/bruising - No    Chest pain/palpitation/edema - No    Dyspnea/wheezing/cough - No    Sleep apnea/asthma/COPD - No, has hypoxia and uses 2 L NC of O2 qhs    HPI Pt with progression of blurry vision with B/L cataracts  He is going for R cataract surgery next week  He notes his tongue is improving since last visit  He is using magic mouthwash with benefit  He denies any new symptoms since last visit  He denies issues with swallowing  He saw Yi Medel yesterday and had biopsies taken  Pt dropped cardiac clearance forms off for Cardio and was told they would fill them out for him       Past Medical History:   Diagnosis Date    [facility]     Cataract     Colitis     Colon cancer [facility]     Diabetes mellitus type II, non insulin dependent ([facility] )     Fatty liver     History of chemotherapy     History of radiation therapy     History of shingles 2018    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     Pneumonia     Skin cancer        Past Surgical History:   Procedure Laterality Date    AORTIC VALVE REPLACEMENT      COLECTOMY      COLONOSCOPY  11/2019    Prior right hemicolectomy    CORONARY ARTERY BYPASS GRAFT      DENTAL SURGERY      KNEE SURGERY      LYMPHADENECTOMY      OTHER SURGICAL HISTORY      MAZE PROCEDURE    MD TOTAL KNEE ARTHROPLASTY Left 1/28/2019    Procedure: ARTHROPLASTY LEFT KNEE TOTAL;  Surgeon: Tucker Werner MD;  Location: [facility];  Service: Orthopedics    SKIN BIOPSY      UPPER GASTROINTESTINAL ENDOSCOPY  11/2019    Cricket gu       Family History   Problem Relation Age of Onset    Heart block Family     Coronary artery disease Mother     Thrombosis Mother     Hypertension Mother    [facility] Arthritis Mother     Hyperlipidemia Mother     Diabetes Father     Hypertension Father     Arthritis Father     Sudden death Father         cardiac    Colon cancer Paternal Grandfather     Breast cancer Sister     Heart disease Brother         Coronary stents       Social History     Tobacco Use    Smoking status: Former Smoker     Packs/day: 1 00     Years: 40 00     Pack years: 40 00     Types: Cigarettes     Quit date:      Years since quittin 7    Smokeless tobacco: Never Used   Vaping Use    Vaping Use: Never used   Substance Use Topics    Alcohol use: Yes     Comment: very rare "beer here and there"    Drug use: No       Current Outpatient Medications   Medication Sig Dispense Refill    al mag oxide-diphenhydramine-lidocaine viscous (MAGIC MOUTHWASH) 1:1:1 suspension Swish and spit 10 mL every 6 (six) hours as needed for mouth pain or discomfort 90 mL 1    aspirin 81 MG tablet Take 1 tablet (81 mg total) by mouth daily      atorvastatin (LIPITOR) 40 mg tablet TAKE 1 TABLET BY MOUTH EVERY DAY 90 tablet 3    cholecalciferol (VITAMIN D3) 1,000 units tablet Take 2 tablets (2,000 Units total) by mouth daily 60 tablet 0    ferrous sulfate 324 MG TBEC Take 1 tablet (324 mg total) by mouth daily with breakfast 30 tablet 2    furosemide (LASIX) 20 mg tablet TAKE 1 TABLET BY MOUTH TWICE A  tablet 1    metFORMIN (GLUCOPHAGE-XR) 500 mg 24 hr tablet TAKE 1 TABLET BY MOUTH EVERY DAY WITH DINNER 90 tablet 2    metoprolol succinate (TOPROL-XL) 100 mg 24 hr tablet TAKE 1 TABLET BY MOUTH EVERY DAY 90 tablet 1    multivitamin-minerals (CENTRUM ADULTS) tablet Take 1 tablet by mouth daily  0    nitroglycerin (NITROSTAT) 0 3 mg SL tablet Nitrostat 0 3 mg sublingual tablet   Place 1 tablet as needed by sublingual route as needed for 90 days   (Patient not taking: Reported on 2021)      omeprazole (PriLOSEC) 40 MG capsule Take 1 capsule (40 mg total) by mouth daily 90 capsule 3    saccharomyces boulardii (FLORASTOR) 250 mg capsule Take 1 capsule (250 mg total) by mouth 2 (two) times a day 60 capsule 1     No current facility-administered medications for this visit  Allergies:   Ceftin [cefuroxime] and Lantus [insulin glargine]      Review of Systems  Review of Systems   Constitutional: Negative for chills and fever  HENT: Negative for congestion and trouble swallowing  Eyes: Positive for visual disturbance  Negative for pain  Respiratory: Negative for cough, shortness of breath and wheezing  Cardiovascular: Negative for chest pain, palpitations and leg swelling  Gastrointestinal: Negative for abdominal pain, blood in stool, constipation, diarrhea, nausea and vomiting  Endocrine: Negative for polydipsia and polyuria  Genitourinary: Negative for difficulty urinating and dysuria  Musculoskeletal: Negative for arthralgias and myalgias  Skin: Negative for rash and wound  Neurological: Negative for dizziness and headaches  Hematological: Does not bruise/bleed easily  Psychiatric/Behavioral: Negative for behavioral problems and confusion  Objective:    BP (!) 100/48   Pulse 62   Temp (!) 97 1 °F (36 2 °C) (Tympanic)   Ht 5' 11" (1 803 m)   Wt 73 2 kg (161 lb 6 4 oz)   BMI 22 51 kg/m²     Physical Exam  Vitals and nursing note reviewed  Constitutional:       General: He is not in acute distress  Appearance: He is well-developed  He is not ill-appearing  HENT:      Head: Normocephalic and atraumatic  Right Ear: Tympanic membrane and external ear normal  There is no impacted cerumen  Left Ear: Tympanic membrane normal  There is no impacted cerumen  Eyes:      General:         Right eye: No discharge  Left eye: No discharge  Conjunctiva/sclera: Conjunctivae normal    Neck:      Trachea: No tracheal deviation        Comments: R carotid bruits  Cardiovascular: Rate and Rhythm: Normal rate and regular rhythm  Heart sounds: Normal heart sounds  No murmur heard  Pulmonary:      Effort: Pulmonary effort is normal  No respiratory distress  Breath sounds: Normal breath sounds  No wheezing, rhonchi or rales  Abdominal:      General: Bowel sounds are normal  There is no distension  Palpations: Abdomen is soft  Tenderness: There is no abdominal tenderness  There is no guarding or rebound  Musculoskeletal:         General: No deformity or signs of injury  Normal range of motion  Cervical back: Neck supple  Lymphadenopathy:      Cervical: No cervical adenopathy  Skin:     General: Skin is warm and dry  Coloration: Skin is not pale  Findings: No rash  Neurological:      General: No focal deficit present  Mental Status: He is alert  Mental status is at baseline  Motor: No abnormal muscle tone  Gait: Gait normal    Psychiatric:         Mood and Affect: Mood normal          Behavior: Behavior normal          Thought Content:  Thought content normal          Judgment: Judgment normal            Cardiographics  ECG: done with cardiology 5/4/21    Imaging  Chest x-ray: not indicated    Lab Review   Recent labs: 9/2/21 - stable microcytic anemia, nml plt and WBC    Assessment:    Patient Active Problem List   Diagnosis    Basal cell carcinoma of skin    Coronary artery disease involving native heart with angina pectoris, unspecified vessel or lesion type ([facility]    Type 2 diabetes mellitus without complication, without long-term current use of insulin (HCC)    Dyslipidemia    Erectile dysfunction    Fatty liver    [facility]    Malignant tumor of cecum ([facility]    Multiple myeloma not having achieved remission ([facility] )    Primary localized osteoarthritis of right knee    Hx of CABG    Essential hypertension    S/P total knee arthroplasty, left    Acute respiratory failure with hypoxia (HCC)    Acute systolic congestive heart failure (HCC)    History of aortic valve replacement with bioprosthetic valve    Leukocytosis    Paroxysmal atrial fibrillation (HCC)    Dysphagia    Personal history of colon cancer    Roberts's esophagus without dysplasia    Schatzki's ring of distal esophagus    Gastroesophageal reflux disease with esophagitis    Pneumonia due to COVID-19 virus    Acute on chronic blood loss anemia    Pulmonary alveolar hemorrhage    Mouth ulcers        Plan:     Surgical Clearance - Cleared    Risk - Pt intermediate risk for low risk surgery    Discussion/Summary:   (1) Preoperative clearance - pt intermediate risk for low risk surgery, recent BW reviewed, pt medically acceptable risk for surgery, no further w/u needed, call with any changes in medical health btw now and surgery, cardiac clearance as per cardiology    (2) R cataract - for surgery as per optho    (3) Hypoxia - on 2 L NC O2 qhs, has f/u with Pulm in Oct, call with new/worse symptoms    (4) CAD - no current CV symptoms, on Lipitor/Toprol/ASA, con't f/u as per Cardio    (5) Chronic systolic HF - currently no sign of decompensation present, con't Lasix/Toprol/ASA/Lipitor, call with any new CV symptoms    (6) PAF - currently NSR and rate controlled with Toprol, on ASA for stroke prevention    (7) B/L carotid atherosclerosis - has a faint bruits on the R, due for CUS - order given, on ASA and statin and last CUS showed <50% dz B/L, can proceed with surgery for now but call asap or go to ED with any stroke/TIA symptoms    (8) HTN - BP at goal, con't current meds    (9) DM type 2 without complications - con't current Metformin, hold am of surgery and take when home and eating, UTD on foot exam 7/21 and will obtain copy of eye exam    (10) Multiple myeloma - con't follow up and tx as per Onc    Colonoscopy 12/20 -5 yrs    Flu vaccine given today         Nalini Guaman, DO

## 2021-10-06 DIAGNOSIS — I10 ESSENTIAL HYPERTENSION: ICD-10-CM

## 2021-10-06 DIAGNOSIS — I25.10 CORONARY ARTERY DISEASE INVOLVING NATIVE CORONARY ARTERY OF NATIVE HEART WITHOUT ANGINA PECTORIS: ICD-10-CM

## 2021-10-06 DIAGNOSIS — I48.0 PAROXYSMAL ATRIAL FIBRILLATION (HCC): ICD-10-CM

## 2021-10-06 DIAGNOSIS — I50.32 CHRONIC DIASTOLIC CONGESTIVE HEART FAILURE (HCC): ICD-10-CM

## 2021-10-06 RX ORDER — METOPROLOL SUCCINATE 100 MG/1
TABLET, EXTENDED RELEASE ORAL
Qty: 90 TABLET | Refills: 1 | Status: SHIPPED | OUTPATIENT
Start: 2021-10-06 | End: 2022-03-30

## 2021-10-20 ENCOUNTER — HOSPITAL ENCOUNTER (OUTPATIENT)
Dept: CT IMAGING | Facility: HOSPITAL | Age: 76
Discharge: HOME | End: 2021-10-20
Payer: COMMERCIAL

## 2021-10-20 ENCOUNTER — OFFICE VISIT (OUTPATIENT)
Dept: PULMONOLOGY | Facility: HOSPITAL | Age: 76
End: 2021-10-20
Payer: COMMERCIAL

## 2021-10-20 VITALS
TEMPERATURE: 98.4 F | BODY MASS INDEX: 23.1 KG/M2 | OXYGEN SATURATION: 97 % | WEIGHT: 165 LBS | HEART RATE: 66 BPM | DIASTOLIC BLOOD PRESSURE: 70 MMHG | HEIGHT: 71 IN | SYSTOLIC BLOOD PRESSURE: 114 MMHG

## 2021-10-20 DIAGNOSIS — U07.1 COVID: ICD-10-CM

## 2021-10-20 DIAGNOSIS — C90.00 MULTIPLE MYELOMA NOT HAVING ACHIEVED REMISSION (HCC): ICD-10-CM

## 2021-10-20 DIAGNOSIS — G47.34 NOCTURNAL HYPOXIA: ICD-10-CM

## 2021-10-20 DIAGNOSIS — R91.1 LUNG NODULE: Primary | ICD-10-CM

## 2021-10-20 PROCEDURE — 3074F SYST BP LT 130 MM HG: CPT | Performed by: INTERNAL MEDICINE

## 2021-10-20 PROCEDURE — G1004 CDSM NDSC: HCPCS

## 2021-10-20 PROCEDURE — 99214 OFFICE O/P EST MOD 30 MIN: CPT | Performed by: INTERNAL MEDICINE

## 2021-10-20 PROCEDURE — 3008F BODY MASS INDEX DOCD: CPT | Performed by: INTERNAL MEDICINE

## 2021-10-20 PROCEDURE — 74177 CT ABD & PELVIS W/CONTRAST: CPT

## 2021-10-20 PROCEDURE — 71260 CT THORAX DX C+: CPT

## 2021-10-20 PROCEDURE — 3078F DIAST BP <80 MM HG: CPT | Performed by: INTERNAL MEDICINE

## 2021-10-20 PROCEDURE — 1036F TOBACCO NON-USER: CPT | Performed by: INTERNAL MEDICINE

## 2021-10-20 RX ADMIN — IOHEXOL 100 ML: 350 INJECTION, SOLUTION INTRAVENOUS at 10:17

## 2021-10-27 ENCOUNTER — HOSPITAL ENCOUNTER (OUTPATIENT)
Dept: NON INVASIVE DIAGNOSTICS | Age: 76
Discharge: HOME | End: 2021-10-27
Payer: COMMERCIAL

## 2021-10-27 DIAGNOSIS — I65.23 ATHEROSCLEROSIS OF BOTH CAROTID ARTERIES: ICD-10-CM

## 2021-10-27 PROBLEM — G47.34 NOCTURNAL HYPOXIA: Status: ACTIVE | Noted: 2019-02-14

## 2021-10-27 PROCEDURE — 93880 EXTRACRANIAL BILAT STUDY: CPT

## 2021-10-27 PROCEDURE — 93880 EXTRACRANIAL BILAT STUDY: CPT | Performed by: INTERNAL MEDICINE

## 2021-11-09 ENCOUNTER — RA CDI HCC (OUTPATIENT)
Dept: OTHER | Facility: HOSPITAL | Age: 76
End: 2021-11-09

## 2021-11-16 ENCOUNTER — OFFICE VISIT (OUTPATIENT)
Dept: FAMILY MEDICINE CLINIC | Facility: HOSPITAL | Age: 76
End: 2021-11-16
Payer: COMMERCIAL

## 2021-11-16 VITALS
DIASTOLIC BLOOD PRESSURE: 56 MMHG | BODY MASS INDEX: 22.85 KG/M2 | SYSTOLIC BLOOD PRESSURE: 94 MMHG | HEART RATE: 72 BPM | HEIGHT: 71 IN | TEMPERATURE: 96.7 F | WEIGHT: 163.2 LBS

## 2021-11-16 DIAGNOSIS — I10 ESSENTIAL HYPERTENSION: Chronic | ICD-10-CM

## 2021-11-16 DIAGNOSIS — I50.22 CHRONIC SYSTOLIC CONGESTIVE HEART FAILURE (HCC): ICD-10-CM

## 2021-11-16 DIAGNOSIS — I48.0 PAROXYSMAL ATRIAL FIBRILLATION (HCC): Chronic | ICD-10-CM

## 2021-11-16 DIAGNOSIS — E11.9 TYPE 2 DIABETES MELLITUS WITHOUT COMPLICATION, WITHOUT LONG-TERM CURRENT USE OF INSULIN (HCC): Chronic | ICD-10-CM

## 2021-11-16 DIAGNOSIS — G47.34 NOCTURNAL HYPOXIA: ICD-10-CM

## 2021-11-16 DIAGNOSIS — Z01.818 PREOPERATIVE CLEARANCE: Primary | ICD-10-CM

## 2021-11-16 DIAGNOSIS — I25.119 CORONARY ARTERY DISEASE INVOLVING NATIVE HEART WITH ANGINA PECTORIS, UNSPECIFIED VESSEL OR LESION TYPE (HCC): ICD-10-CM

## 2021-11-16 DIAGNOSIS — H26.9 CATARACT OF LEFT EYE, UNSPECIFIED CATARACT TYPE: ICD-10-CM

## 2021-11-16 PROCEDURE — 3074F SYST BP LT 130 MM HG: CPT | Performed by: INTERNAL MEDICINE

## 2021-11-16 PROCEDURE — 1160F RVW MEDS BY RX/DR IN RCRD: CPT | Performed by: INTERNAL MEDICINE

## 2021-11-16 PROCEDURE — 1036F TOBACCO NON-USER: CPT | Performed by: INTERNAL MEDICINE

## 2021-11-16 PROCEDURE — 99214 OFFICE O/P EST MOD 30 MIN: CPT | Performed by: INTERNAL MEDICINE

## 2021-11-16 PROCEDURE — 3078F DIAST BP <80 MM HG: CPT | Performed by: INTERNAL MEDICINE

## 2021-11-16 PROCEDURE — 3008F BODY MASS INDEX DOCD: CPT | Performed by: INTERNAL MEDICINE

## 2021-11-16 RX ORDER — CLOBETASOL PROPIONATE 0.5 MG/G
OINTMENT TOPICAL
COMMUNITY
Start: 2021-10-06 | End: 2022-04-14

## 2021-11-17 ENCOUNTER — TELEPHONE (OUTPATIENT)
Dept: PULMONOLOGY | Facility: CLINIC | Age: 76
End: 2021-11-17

## 2021-11-17 DIAGNOSIS — G47.34 NOCTURNAL HYPOXIA: Primary | ICD-10-CM

## 2021-12-03 DIAGNOSIS — E11.9 TYPE 2 DIABETES MELLITUS WITHOUT COMPLICATION, WITHOUT LONG-TERM CURRENT USE OF INSULIN (HCC): ICD-10-CM

## 2021-12-03 RX ORDER — METFORMIN HYDROCHLORIDE 500 MG/1
TABLET, EXTENDED RELEASE ORAL
Qty: 90 TABLET | Refills: 2 | Status: SHIPPED | OUTPATIENT
Start: 2021-12-03

## 2021-12-09 ENCOUNTER — HOSPITAL ENCOUNTER (OUTPATIENT)
Dept: ULTRASOUND IMAGING | Facility: HOSPITAL | Age: 76
Discharge: HOME | End: 2021-12-09
Attending: OTOLARYNGOLOGY
Payer: COMMERCIAL

## 2021-12-09 DIAGNOSIS — R59.1 LYMPHADENOPATHY OF HEAD AND NECK: ICD-10-CM

## 2021-12-09 PROCEDURE — 76536 US EXAM OF HEAD AND NECK: CPT

## 2021-12-11 NOTE — ASSESSMENT & PLAN NOTE
Patient admitted with acute kidney injury that was most likely prerenal due to medications, decreased p o  Intake  Acute kidney injury resolved with IV fluid hydration    Creatinine returned to baseline of 0 9 FAMILY HISTORY:  Father  Still living? No  Family history of dementia, Age at diagnosis: Age Unknown  Family history of hypertension, Age at diagnosis: Age Unknown  Family history of prostate cancer, Age at diagnosis:     Mother  Still living? No  Family history of bone cancer, Age at diagnosis: 71-80    Sibling  Still living? No  Family history of AIDS, Age at diagnosis: 21-30    Child  Still living? Yes, Estimated age: Age Unknown  Family history of cerebral palsy, Age at diagnosis: Age Unknown

## 2021-12-14 ENCOUNTER — HOSPITAL ENCOUNTER (OUTPATIENT)
Dept: ULTRASOUND IMAGING | Facility: HOSPITAL | Age: 76
Discharge: HOME | End: 2021-12-14
Attending: OTOLARYNGOLOGY
Payer: COMMERCIAL

## 2021-12-14 DIAGNOSIS — R59.1 LYMPHADENOPATHY OF HEAD AND NECK: ICD-10-CM

## 2021-12-14 PROCEDURE — 88172 CYTP DX EVAL FNA 1ST EA SITE: CPT | Performed by: PATHOLOGY

## 2021-12-14 PROCEDURE — 88173 CYTOPATH EVAL FNA REPORT: CPT | Performed by: PATHOLOGY

## 2021-12-14 PROCEDURE — 88184 FLOWCYTOMETRY/ TC 1 MARKER: CPT

## 2021-12-14 PROCEDURE — 10005 FNA BX W/US GDN 1ST LES: CPT

## 2021-12-14 RX ORDER — LIDOCAINE HYDROCHLORIDE 10 MG/ML
5 INJECTION, SOLUTION EPIDURAL; INFILTRATION; INTRACAUDAL; PERINEURAL ONCE
Status: COMPLETED | OUTPATIENT
Start: 2021-12-14 | End: 2021-12-14

## 2021-12-14 RX ADMIN — LIDOCAINE HYDROCHLORIDE 2.5 ML: 10 INJECTION, SOLUTION EPIDURAL; INFILTRATION; INTRACAUDAL; PERINEURAL at 14:39

## 2021-12-16 ENCOUNTER — TRANSCRIBE ORDERS (OUTPATIENT)
Dept: LAB | Facility: HOSPITAL | Age: 76
End: 2021-12-16

## 2021-12-16 DIAGNOSIS — R59.1 LYMPHADENOPATHY OF HEAD AND NECK: ICD-10-CM

## 2021-12-16 DIAGNOSIS — C85.90 LYMPHOMA, UNSPECIFIED BODY REGION, UNSPECIFIED LYMPHOMA TYPE (HCC): Primary | ICD-10-CM

## 2021-12-16 DIAGNOSIS — R59.1 LYMPHADENOPATHY OF HEAD AND NECK: Primary | ICD-10-CM

## 2021-12-31 LAB — SCAN RESULT: NORMAL

## 2022-01-14 ENCOUNTER — TELEPHONE (OUTPATIENT)
Dept: CARDIOLOGY CLINIC | Facility: CLINIC | Age: 77
End: 2022-01-14

## 2022-01-14 NOTE — TELEPHONE ENCOUNTER
Fax from [facility]  Requesting cardiac clearance/ ASA hold for Right neck lymph node biopsy on 1/20/22  LOV 6/29/21  OK to prep letter or does pt need to be seen?

## 2022-01-17 ENCOUNTER — TELEPHONE (OUTPATIENT)
Dept: PULMONOLOGY | Facility: CLINIC | Age: 77
End: 2022-01-17

## 2022-01-17 NOTE — TELEPHONE ENCOUNTER
Established patient needing pre-op clearance  Surgery: Right neck lymph node biopsy  Surgery date: 22  Last seen by us: 10/20/21  On oxygen: Yes  Kind of Sedation: MAC  Length of surgery: 30 minutes  Surgeon: Dr Rico Apgar contact: [phone]  Form/Office Note: Form/Note  Fax: [phone]    Schedule for a virtual tomorrow

## 2022-01-18 ENCOUNTER — TELEMEDICINE (OUTPATIENT)
Dept: PULMONOLOGY | Facility: HOSPITAL | Age: 77
End: 2022-01-18
Payer: COMMERCIAL

## 2022-01-18 VITALS — WEIGHT: 156 LBS | OXYGEN SATURATION: 96 % | BODY MASS INDEX: 21.76 KG/M2 | HEART RATE: 67 BPM

## 2022-01-18 DIAGNOSIS — R91.1 LUNG NODULE: ICD-10-CM

## 2022-01-18 DIAGNOSIS — Z01.811 PREOP PULMONARY/RESPIRATORY EXAM: Primary | ICD-10-CM

## 2022-01-18 DIAGNOSIS — C82.80 OTHER TYPE OF FOLLICULAR LYMPHOMA, UNSPECIFIED BODY REGION (HCC): Chronic | ICD-10-CM

## 2022-01-18 LAB
ALBUMIN SERPL-MCNC: 4.1 G/DL (ref 3.7–4.7)
ALBUMIN/CREAT UR: 4 MG/G CREAT (ref 0–29)
ALBUMIN/GLOB SERPL: 2.1 {RATIO} (ref 1.2–2.2)
ALP SERPL-CCNC: 118 IU/L (ref 44–121)
ALT SERPL-CCNC: 9 IU/L (ref 0–44)
AST SERPL-CCNC: 11 IU/L (ref 0–40)
BASOPHILS # BLD AUTO: 0.1 X10E3/UL (ref 0–0.2)
BASOPHILS NFR BLD AUTO: 1 %
BILIRUB SERPL-MCNC: 0.5 MG/DL (ref 0–1.2)
BUN SERPL-MCNC: 14 MG/DL (ref 8–27)
BUN/CREAT SERPL: 13 (ref 10–24)
CALCIUM SERPL-MCNC: 9.4 MG/DL (ref 8.6–10.2)
CHLORIDE SERPL-SCNC: 96 MMOL/L (ref 96–106)
CHOLEST SERPL-MCNC: 103 MG/DL (ref 100–199)
CHOLEST/HDLC SERPL: 3.8 RATIO (ref 0–5)
CO2 SERPL-SCNC: 29 MMOL/L (ref 20–29)
CREAT SERPL-MCNC: 1.1 MG/DL (ref 0.76–1.27)
CREAT UR-MCNC: 117.5 MG/DL
EOSINOPHIL # BLD AUTO: 0.9 X10E3/UL (ref 0–0.4)
EOSINOPHIL NFR BLD AUTO: 8 %
ERYTHROCYTE [DISTWIDTH] IN BLOOD BY AUTOMATED COUNT: 14.5 % (ref 11.6–15.4)
EST. AVERAGE GLUCOSE BLD GHB EST-MCNC: 128 MG/DL
GLOBULIN SER-MCNC: 2 G/DL (ref 1.5–4.5)
GLUCOSE SERPL-MCNC: 128 MG/DL (ref 65–99)
HBA1C MFR BLD: 6.1 % (ref 4.8–5.6)
HCT VFR BLD AUTO: 37 % (ref 37.5–51)
HDLC SERPL-MCNC: 27 MG/DL
HGB BLD-MCNC: 11.9 G/DL (ref 13–17.7)
IMM GRANULOCYTES # BLD: 0.1 X10E3/UL (ref 0–0.1)
IMM GRANULOCYTES NFR BLD: 1 %
LDLC SERPL CALC-MCNC: 54 MG/DL (ref 0–99)
LYMPHOCYTES # BLD AUTO: 3.4 X10E3/UL (ref 0.7–3.1)
LYMPHOCYTES NFR BLD AUTO: 29 %
MCH RBC QN AUTO: 25.8 PG (ref 26.6–33)
MCHC RBC AUTO-ENTMCNC: 32.2 G/DL (ref 31.5–35.7)
MCV RBC AUTO: 80 FL (ref 79–97)
MICROALBUMIN UR-MCNC: 4.5 UG/ML
MONOCYTES # BLD AUTO: 1 X10E3/UL (ref 0.1–0.9)
MONOCYTES NFR BLD AUTO: 8 %
NEUTROPHILS # BLD AUTO: 6.4 X10E3/UL (ref 1.4–7)
NEUTROPHILS NFR BLD AUTO: 53 %
PLATELET # BLD AUTO: 390 X10E3/UL (ref 150–450)
POTASSIUM SERPL-SCNC: 3.9 MMOL/L (ref 3.5–5.2)
PROT SERPL-MCNC: 6.1 G/DL (ref 6–8.5)
PSA SERPL-MCNC: 1.4 NG/ML (ref 0–4)
RBC # BLD AUTO: 4.61 X10E6/UL (ref 4.14–5.8)
SL AMB EGFR AFRICAN AMERICAN: 76 ML/MIN/1.73
SL AMB EGFR NON AFRICAN AMERICAN: 65 ML/MIN/1.73
SL AMB REFLEX CRITERIA: NORMAL
SL AMB T4, FREE (DIRECT): 1.09 NG/DL (ref 0.82–1.77)
SL AMB VLDL CHOLESTEROL CALC: 22 MG/DL (ref 5–40)
SODIUM SERPL-SCNC: 139 MMOL/L (ref 134–144)
TRIGL SERPL-MCNC: 117 MG/DL (ref 0–149)
TSH SERPL DL<=0.005 MIU/L-ACNC: 6.26 UIU/ML (ref 0.45–4.5)
WBC # BLD AUTO: 11.8 X10E3/UL (ref 3.4–10.8)

## 2022-01-18 PROCEDURE — 99214 OFFICE O/P EST MOD 30 MIN: CPT | Performed by: PHYSICIAN ASSISTANT

## 2022-01-18 PROCEDURE — 3044F HG A1C LEVEL LT 7.0%: CPT | Performed by: INTERNAL MEDICINE

## 2022-01-18 PROCEDURE — 3061F NEG MICROALBUMINURIA REV: CPT | Performed by: INTERNAL MEDICINE

## 2022-01-18 NOTE — PROGRESS NOTES
Virtual Regular Visit    Verification of patient location:    Patient is located in the following state in which I hold an active license [place]      Assessment/Plan:    Problem List Items Addressed This Visit        Other    Lymphoma ([address] ) (Chronic)      Other Visit Diagnoses     Preop pulmonary/respiratory exam    -  Primary    Lung nodule            Patient presenting for pulmonary pre-op clearance    Surgery: Right neck lymph node biopsy  Kind of Sedation: MAC  Length of surgery: 30 minutes  Surgeon: Dr Lv Gooden    Patient denies history of pulmonary complications in the perioperative period  No history of PE or DVT  DOUG postoperative respiratory failure risk assessment: 0 7 % risk of mechanical ventilation > 48 hrs after surgery, or unplanned intubation < 30 days of surgery     ARISCAT score for postoperative pulmonary complication: 3 pts, 5 1% low risk of in-hospital postoperative complications  Patient is likely at low risk for pulmonary complications related to surgery  Discussed pulmonary complications with the patient including and not limited to aspiration pneumonia, atelectasis, pleural effusions, pneumothorax, infection, respiratory failure/prolonged intubation, hypoxemia, exacerbation of underlying pulmonary disease    Advise decreasing risk for pulmonary complications following the procedure including early ambulation, out of bed to chair postoperatively, and use of incentive spirometer to prevent pneumonia    Reports his PET-CT did not demonstrate any activity in the chest   He wants to focus on getting scheduled biopsy/oncology workup prior to going for any repeat imaging of the chest     No longer requiring any supplemental oxygen             Reason for visit is   Chief Complaint   Patient presents with    Virtual Regular Visit        Encounter provider Yanni Weeks PA-C    Provider located at [place] [address] [address] [id number]      Recent Visits  No visits were found meeting these conditions  Showing recent visits within past 7 days and meeting all other requirements  Today's Visits  Date Type Provider Dept   01/18/22 Telemedicine Leora De Leon PA-C [facility]   Showing today's visits and meeting all other requirements  Future Appointments  No visits were found meeting these conditions  Showing future appointments within next 150 days and meeting all other requirements       The patient was identified by name and date of birth  Mike Sandoval was informed that this is a telemedicine visit and that the visit is being conducted through [address] [place] [address] Now and patient was informed that this is a secure, HIPAA-compliant platform  He agrees to proceed     My office door was closed  No one else was in the room  He acknowledged consent and understanding of privacy and security of the video platform  The patient has agreed to participate and understands they can discontinue the visit at any time  Patient is aware this is a billable service  Subjective  HPI  Mike Sandoval is a 77 y o  male with past medical history including lymphoma, COVID-19 pneumonia, CAD, AFib, hypertension presenting for pulmonary preop clearance  He is scheduled for lymph node biopsy later this week  There is concern that he has recurrence of lymphoma  He says he has not been having any respiratory issues  Denies shortness of breath or cough  No longer requires any supplemental oxygen  Checks his pulse oximeter and saturations are greater than 90%          Past Medical History:   Diagnosis Date    Atrial fibrillation ([address] )     Cataract     Colitis     [facility]     Fatty liver     History of chemotherapy     History of radiation therapy     History of shingles 2018    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     Pneumonia     Skin cancer        Past Surgical History:   Procedure Laterality Date    AORTIC VALVE REPLACEMENT      COLECTOMY      COLONOSCOPY  11/2019    Prior right hemicolectomy    CORONARY ARTERY BYPASS GRAFT      DENTAL SURGERY      KNEE SURGERY      LYMPHADENECTOMY      OTHER SURGICAL HISTORY      MAZE PROCEDURE    IL TOTAL KNEE ARTHROPLASTY Left 1/28/2019    Procedure: ARTHROPLASTY LEFT KNEE TOTAL;  Surgeon: Soraya Gonzales MD;  Location: [facility];  Service: Orthopedics    SKIN BIOPSY      UPPER GASTROINTESTINAL ENDOSCOPY  11/2019    Schatzki ring    US GUIDED LYMPH NODE BIOPSY RIGHT  12/14/2021       Current Outpatient Medications   Medication Sig Dispense Refill    al mag oxide-diphenhydramine-lidocaine viscous (MAGIC MOUTHWASH) 1:1:1 suspension Swish and spit 10 mL every 6 (six) hours as needed for mouth pain or discomfort 90 mL 1    aspirin 81 MG tablet Take 1 tablet (81 mg total) by mouth daily      atorvastatin (LIPITOR) 40 mg tablet TAKE 1 TABLET BY MOUTH EVERY DAY 90 tablet 3    cholecalciferol (VITAMIN D3) 1,000 units tablet Take 2 tablets (2,000 Units total) by mouth daily 60 tablet 0    clobetasol (TEMOVATE) 0 05 % ointment Apply topically to rash as needed      ferrous sulfate 324 MG TBEC Take 1 tablet (324 mg total) by mouth daily with breakfast 30 tablet 2    furosemide (LASIX) 20 mg tablet TAKE 1 TABLET BY MOUTH TWICE A  tablet 1    metFORMIN (GLUCOPHAGE-XR) 500 mg 24 hr tablet TAKE 1 TABLET BY MOUTH EVERY DAY WITH DINNER 90 tablet 2    metoprolol succinate (TOPROL-XL) 100 mg 24 hr tablet TAKE 1 TABLET BY MOUTH EVERY DAY 90 tablet 1    multivitamin-minerals (CENTRUM ADULTS) tablet Take 1 tablet by mouth daily  0    nitroglycerin (NITROSTAT) 0 3 mg SL tablet Nitrostat 0 3 mg sublingual tablet   Place 1 tablet as needed by sublingual route as needed for 90 days        omeprazole (PriLOSEC) 40 MG capsule Take 1 capsule (40 mg total) by mouth daily 90 capsule 3    saccharomyces boulardii (FLORASTOR) 250 mg capsule Take 1 capsule (250 mg total) by mouth 2 (two) times a day 60 capsule 1     No current facility-administered medications for this visit  Allergies   Allergen Reactions    Ceftin [Cefuroxime] Itching     However, has tolerated Cefazolin and Pip-Tazo since, which have different side chains  Be cautious with / avoid 2nd, 3rd, or 4th Gen Cephs that have similar side chains to Cefuroxime   Lantus [Insulin Glargine] Itching       Review of Systems   Respiratory: Negative for cough and shortness of breath  Cardiovascular: Negative for chest pain  Hematological: Positive for adenopathy  All other systems reviewed and are negative  Video Exam    Vitals:    01/18/22 1314   Pulse: 67   SpO2: 96%   Weight: 70 8 kg (156 lb)       Physical Exam     General:  Awake, alert, and oriented  No acute distress  HEENT: Normocephalic, without obvious abnormality, atraumatic  Conjunctiva and EOM are normal   Sclera nonicteric, noninjected  Hearing intact  Normal range of motion of neck  Pulm:  Effort normal  No respiratory distress  Cardiovascular:  No JVD appreciated  Musculoskeletal:  No gross deformity noted  Neuro:  No aphasia  No facial asymmetry  No acute focal neurologic deficit noted  Skin:  Appears to be dry  Not diaphoretic  No jaundice, erythema, or cyanosis appreciated  Psych:  Cooperative  Normal mood and affect  Behavior is normal  Judgement and thought content normal  No acute psychosis  I have spent 10 minutes with Patient  today in which greater than 50% of this time was spent in counseling/coordination of care regarding Diagnostic results, Risks and benefits of tx options, Intructions for management, Patient and family education, Importance of tx compliance, Risk factor reductions and Impressions  VIRTUAL VISIT 5[address] David Morgan verbally agrees to participate in [employer]   Pt is aware that [employer] could be limited without vital signs or the ability to perform a full hands-on physical Zana understands he or the provider may request at any time to terminate the video visit and request the patient to seek care or treatment in person

## 2022-01-18 NOTE — LETTER
January 18, 2022     Kiley Garcia MD  [address] [facility]  [address]    Patient: Allyson Vidales   YOB: 1945   Date of Visit: 1/18/2022       Dear Dr Jose Hampton: Thank you for referring Steven Black to me for evaluation  Below are my notes for this consultation  If you have questions, please do not hesitate to call me  I look forward to following your patient along with you  Sincerely,        Dung Dye PA-C        CC: No Recipients  Filemon Prieto  1/18/2022  3:23 PM  Sign when Signing Visit    Virtual Regular Visit    Verification of patient location:    Patient is located in the following state in which I hold an active license [place]      Assessment/Plan:    Problem List Items Addressed This Visit        Other    Lymphoma ([address] ) (Chronic)      Other Visit Diagnoses     Preop pulmonary/respiratory exam    -  Primary    Lung nodule            Patient presenting for pulmonary pre-op clearance    Surgery: Right neck lymph node biopsy  Kind of Sedation: MAC  Length of surgery: 30 minutes  Surgeon: Dr Jose Hampton    Patient denies history of pulmonary complications in the perioperative period  No history of PE or DVT  CAMACHO postoperative respiratory failure risk assessment: 0 7 % risk of mechanical ventilation > 48 hrs after surgery, or unplanned intubation < 30 days of surgery     ARISCAT score for postoperative pulmonary complication: 3 pts, 1 6% low risk of in-hospital postoperative complications  Patient is likely at low risk for pulmonary complications related to surgery  Discussed pulmonary complications with the patient including and not limited to aspiration pneumonia, atelectasis, pleural effusions, pneumothorax, infection, respiratory failure/prolonged intubation, hypoxemia, exacerbation of underlying pulmonary disease    Advise decreasing risk for pulmonary complications following the procedure including early ambulation, out of bed to chair postoperatively, and use of incentive spirometer to prevent pneumonia    Reports his PET-CT did not demonstrate any activity in the chest   He wants to focus on getting scheduled biopsy/oncology workup prior to going for any repeat imaging of the chest     No longer requiring any supplemental oxygen             Reason for visit is   Chief Complaint   Patient presents with    Virtual Regular Visit        Encounter provider Scot Espinosa PA-C    Provider located at [address] [place] [address]      Recent Visits  No visits were found meeting these conditions  Showing recent visits within past 7 days and meeting all other requirements  Today's Visits  Date Type Provider Dept   01/18/22 Telemedicine Scot Espinosa PA-C [facility] [place]   Showing today's visits and meeting all other requirements  Future Appointments  No visits were found meeting these conditions  Showing future appointments within next 150 days and meeting all other requirements       The patient was identified by name and date of birth  Aj Styles was informed that this is a telemedicine visit and that the visit is being conducted through [address] [place] [address] Now and patient was informed that this is a secure, HIPAA-compliant platform  He agrees to proceed     My office door was closed  No one else was in the room  He acknowledged consent and understanding of privacy and security of the video platform  The patient has agreed to participate and understands they can discontinue the visit at any time  Patient is aware this is a billable service  Subjective  HPI  Aj Styles is a 77 y o  male with past medical history including lymphoma, COVID-19 pneumonia, CAD, AFib, hypertension presenting for pulmonary preop clearance  He is scheduled for lymph node biopsy later this week  There is concern that he has recurrence of lymphoma    He says he has not been having any respiratory issues  Denies shortness of breath or cough  No longer requires any supplemental oxygen  Checks his pulse oximeter and saturations are greater than 90%          Past Medical History:   Diagnosis Date    Atrial fibrillation [facility]     Cataract     Colitis     Colon cancer ([facility]     Fatty liver     History of chemotherapy     History of radiation therapy     History of shingles 2018    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[facility] )     Pneumonia     Skin cancer        Past Surgical History:   Procedure Laterality Date    AORTIC VALVE REPLACEMENT      COLECTOMY      COLONOSCOPY  11/2019    Prior right hemicolectomy    CORONARY ARTERY BYPASS GRAFT      DENTAL SURGERY      KNEE SURGERY      LYMPHADENECTOMY      OTHER SURGICAL HISTORY      MAZE PROCEDURE    SC TOTAL KNEE ARTHROPLASTY Left 1/28/2019    Procedure: ARTHROPLASTY LEFT KNEE TOTAL;  Surgeon: Judith Comer MD;  Location:  MAIN OR;  Service: Orthopedics    SKIN BIOPSY      UPPER GASTROINTESTINAL ENDOSCOPY  11/2019    Schatzki ring    US GUIDED LYMPH NODE BIOPSY RIGHT  12/14/2021       Current Outpatient Medications   Medication Sig Dispense Refill    al mag oxide-diphenhydramine-lidocaine viscous (MAGIC MOUTHWASH) 1:1:1 suspension Swish and spit 10 mL every 6 (six) hours as needed for mouth pain or discomfort 90 mL 1    aspirin 81 MG tablet Take 1 tablet (81 mg total) by mouth daily      atorvastatin (LIPITOR) 40 mg tablet TAKE 1 TABLET BY MOUTH EVERY DAY 90 tablet 3    cholecalciferol (VITAMIN D3) 1,000 units tablet Take 2 tablets (2,000 Units total) by mouth daily 60 tablet 0    clobetasol (TEMOVATE) 0 05 % ointment Apply topically to rash as needed      ferrous sulfate 324 MG TBEC Take 1 tablet (324 mg total) by mouth daily with breakfast 30 tablet 2    furosemide (LASIX) 20 mg tablet TAKE 1 TABLET BY MOUTH TWICE A  tablet 1    metFORMIN (GLUCOPHAGE-XR) 500 mg 24 hr tablet TAKE 1 TABLET BY MOUTH EVERY DAY WITH DINNER 90 tablet 2    metoprolol succinate (TOPROL-XL) 100 mg 24 hr tablet TAKE 1 TABLET BY MOUTH EVERY DAY 90 tablet 1    multivitamin-minerals (CENTRUM ADULTS) tablet Take 1 tablet by mouth daily  0    nitroglycerin (NITROSTAT) 0 3 mg SL tablet Nitrostat 0 3 mg sublingual tablet   Place 1 tablet as needed by sublingual route as needed for 90 days   omeprazole (PriLOSEC) 40 MG capsule Take 1 capsule (40 mg total) by mouth daily 90 capsule 3    saccharomyces boulardii (FLORASTOR) 250 mg capsule Take 1 capsule (250 mg total) by mouth 2 (two) times a day 60 capsule 1     No current facility-administered medications for this visit  Allergies   Allergen Reactions    Ceftin [Cefuroxime] Itching     However, has tolerated Cefazolin and Pip-Tazo since, which have different side chains  Be cautious with / avoid 2nd, 3rd, or 4th Gen Cephs that have similar side chains to Cefuroxime   Lantus [Insulin Glargine] Itching       Review of Systems   Respiratory: Negative for cough and shortness of breath  Cardiovascular: Negative for chest pain  Hematological: Positive for adenopathy  All other systems reviewed and are negative  Video Exam    Vitals:    01/18/22 1314   Pulse: 67   SpO2: 96%   Weight: 70 8 kg (156 lb)       Physical Exam     General:  Awake, alert, and oriented  No acute distress  HEENT: Normocephalic, without obvious abnormality, atraumatic  Conjunctiva and EOM are normal   Sclera nonicteric, noninjected  Hearing intact  Normal range of motion of neck  Pulm:  Effort normal  No respiratory distress  Cardiovascular:  No JVD appreciated  Musculoskeletal:  No gross deformity noted  Neuro:  No aphasia  No facial asymmetry  No acute focal neurologic deficit noted  Skin:  Appears to be dry  Not diaphoretic  No jaundice, erythema, or cyanosis appreciated  Psych:  Cooperative  Normal mood and affect   Behavior is normal  Judgement and thought content normal  No acute psychosis  I have spent 10 minutes with Patient  today in which greater than 50% of this time was spent in counseling/coordination of care regarding Diagnostic results, Risks and benefits of tx options, Intructions for management, Patient and family education, Importance of tx compliance, Risk factor reductions and Impressions  VIRTUAL VISIT [address] Morgan verbally agrees to participate in [employer]  Pt is aware that [employer] could be limited without vital signs or the ability to perform a full hands-on physical Zana understands he or the provider may request at any time to terminate the video visit and request the patient to seek care or treatment in person

## 2022-01-20 ENCOUNTER — LAB REQUISITION (OUTPATIENT)
Dept: LAB | Facility: HOSPITAL | Age: 77
End: 2022-01-20
Payer: COMMERCIAL

## 2022-01-20 DIAGNOSIS — L04.0 ACUTE LYMPHADENITIS OF FACE, HEAD AND NECK: ICD-10-CM

## 2022-01-20 PROCEDURE — 88312 SPECIAL STAINS GROUP 1: CPT | Performed by: PATHOLOGY

## 2022-01-20 PROCEDURE — 88185 FLOWCYTOMETRY/TC ADD-ON: CPT | Performed by: OTOLARYNGOLOGY

## 2022-01-20 PROCEDURE — 88305 TISSUE EXAM BY PATHOLOGIST: CPT | Performed by: PATHOLOGY

## 2022-01-20 PROCEDURE — 88364 INSITU HYBRIDIZATION (FISH): CPT | Performed by: PATHOLOGY

## 2022-01-20 PROCEDURE — 88365 INSITU HYBRIDIZATION (FISH): CPT | Performed by: PATHOLOGY

## 2022-01-20 PROCEDURE — 88342 IMHCHEM/IMCYTCHM 1ST ANTB: CPT | Performed by: PATHOLOGY

## 2022-01-20 PROCEDURE — 88341 IMHCHEM/IMCYTCHM EA ADD ANTB: CPT | Performed by: PATHOLOGY

## 2022-01-20 PROCEDURE — 88184 FLOWCYTOMETRY/ TC 1 MARKER: CPT | Performed by: OTOLARYNGOLOGY

## 2022-01-21 LAB — SCAN RESULT: NORMAL

## 2022-01-24 ENCOUNTER — OFFICE VISIT (OUTPATIENT)
Dept: FAMILY MEDICINE CLINIC | Facility: HOSPITAL | Age: 77
End: 2022-01-24
Payer: COMMERCIAL

## 2022-01-24 VITALS
HEIGHT: 71 IN | WEIGHT: 159.4 LBS | BODY MASS INDEX: 22.31 KG/M2 | TEMPERATURE: 97.7 F | DIASTOLIC BLOOD PRESSURE: 60 MMHG | SYSTOLIC BLOOD PRESSURE: 94 MMHG | HEART RATE: 70 BPM

## 2022-01-24 DIAGNOSIS — D50.9 MICROCYTIC ANEMIA: ICD-10-CM

## 2022-01-24 DIAGNOSIS — Z00.00 MEDICARE ANNUAL WELLNESS VISIT, SUBSEQUENT: ICD-10-CM

## 2022-01-24 DIAGNOSIS — E78.5 DYSLIPIDEMIA: Chronic | ICD-10-CM

## 2022-01-24 DIAGNOSIS — I25.119 CORONARY ARTERY DISEASE INVOLVING NATIVE HEART WITH ANGINA PECTORIS, UNSPECIFIED VESSEL OR LESION TYPE (HCC): ICD-10-CM

## 2022-01-24 DIAGNOSIS — C82.80 OTHER TYPE OF FOLLICULAR LYMPHOMA, UNSPECIFIED BODY REGION (HCC): Chronic | ICD-10-CM

## 2022-01-24 DIAGNOSIS — E11.9 TYPE 2 DIABETES MELLITUS WITHOUT COMPLICATION, WITHOUT LONG-TERM CURRENT USE OF INSULIN (HCC): Primary | Chronic | ICD-10-CM

## 2022-01-24 DIAGNOSIS — R79.89 ELEVATED TSH: ICD-10-CM

## 2022-01-24 DIAGNOSIS — I48.0 PAROXYSMAL ATRIAL FIBRILLATION (HCC): ICD-10-CM

## 2022-01-24 DIAGNOSIS — I50.22 CHRONIC SYSTOLIC CONGESTIVE HEART FAILURE (HCC): ICD-10-CM

## 2022-01-24 DIAGNOSIS — Z11.59 NEED FOR HEPATITIS C SCREENING TEST: ICD-10-CM

## 2022-01-24 PROCEDURE — 3078F DIAST BP <80 MM HG: CPT | Performed by: INTERNAL MEDICINE

## 2022-01-24 PROCEDURE — G0439 PPPS, SUBSEQ VISIT: HCPCS | Performed by: INTERNAL MEDICINE

## 2022-01-24 PROCEDURE — 1101F PT FALLS ASSESS-DOCD LE1/YR: CPT | Performed by: INTERNAL MEDICINE

## 2022-01-24 PROCEDURE — 99214 OFFICE O/P EST MOD 30 MIN: CPT | Performed by: INTERNAL MEDICINE

## 2022-01-24 PROCEDURE — 1036F TOBACCO NON-USER: CPT | Performed by: INTERNAL MEDICINE

## 2022-01-24 PROCEDURE — 3074F SYST BP LT 130 MM HG: CPT | Performed by: INTERNAL MEDICINE

## 2022-01-24 PROCEDURE — 3288F FALL RISK ASSESSMENT DOCD: CPT | Performed by: INTERNAL MEDICINE

## 2022-01-24 PROCEDURE — 1160F RVW MEDS BY RX/DR IN RCRD: CPT | Performed by: INTERNAL MEDICINE

## 2022-01-24 PROCEDURE — 3725F SCREEN DEPRESSION PERFORMED: CPT | Performed by: INTERNAL MEDICINE

## 2022-01-24 PROCEDURE — 1125F AMNT PAIN NOTED PAIN PRSNT: CPT | Performed by: INTERNAL MEDICINE

## 2022-01-24 PROCEDURE — 1170F FXNL STATUS ASSESSED: CPT | Performed by: INTERNAL MEDICINE

## 2022-01-24 NOTE — ASSESSMENT & PLAN NOTE
S/p recent cervical lymph node biopsy, cytology neg but path pending, con't f/u as per ENT and Heme/Onc

## 2022-01-24 NOTE — PROGRESS NOTES
Assessment/Plan:    Type 2 diabetes mellitus without complication, without long-term current use of insulin (HCC)    Lab Results   Component Value Date    HGBA1C 6 1 (H) 01/17/2022   DM type 2 without complications - controlled with A1C 6 1 - con't current Metformin, con't healthy diet, on statin but no ACE/ARB, UTD on DM foot exam (7/21) and urged to have DM eye exam, recheck BW in 6 mo - order given    Dyslipidemia  LDL at goal, con't current statin, HDL a bit low, encouraged to keep active    Microcytic anemia  Stable, on iron supplement, con't regular BW as per Heme/Onc, call with any significant s/sx of anemia    Chronic systolic congestive heart failure ([facility] )  Wt Readings from Last 3 Encounters:   01/24/22 72 3 kg (159 lb 6 4 oz)   01/18/22 70 8 kg (156 lb)   01/13/22 73 9 kg (163 lb)     No current s/sx of decompensation, on lasix/beta blocker/statin/ASA, urged to f/u with Cardio as it has been some time, call with CV symptoms    Coronary artery disease involving native heart with angina pectoris, unspecified vessel or lesion type (HCC)  No current CV symptoms, on ASA/beta blocker/statin, call with new/worse symptoms    Paroxysmal atrial fibrillation (HCC)  Currently NSR, on Toprol for rate control, on ASA for stroke prevention, call with CV symptoms    Lymphoma ([facility] )  S/p recent cervical lymph node biopsy, cytology neg but path pending, con't f/u as per ENT and Heme/Onc       Diagnoses and all orders for this visit:    Type 2 diabetes mellitus without complication, without long-term current use of insulin (HCC)  -     Hemoglobin A1C; Future  -     Basic metabolic panel; Future  -     Hemoglobin A1C  -     Basic metabolic panel    Dyslipidemia    Microcytic anemia    Other type of follicular lymphoma, unspecified body region (HCC)    Elevated TSH  Comments:  FT4 wnl, recheck TFT"s in 6 mos  - order given, will follow  Orders:  -     TSH, 3rd generation with Free T4 reflex;  Future  -     TSH, 3rd generation with Free T4 reflex    Chronic systolic congestive heart failure (HCC)    Paroxysmal atrial fibrillation (HCC)    Coronary artery disease involving native heart with angina pectoris, unspecified vessel or lesion type ([facility] )    Medicare annual wellness visit, subsequent    Need for hepatitis C screening test  -     Hepatitis C antibody; Future  -     Hepatitis C antibody      Colonoscopy 1/21 - 5 yrs    Has had COVID vaccine and booster        Subjective:      Patient ID: Vickie Gonzalez is a 77 y o  male  HPI Pt here for follow up appt/BW results and AWV    BW results were d/w pt in detail: FBS/A1C 128/6 2, rest of CMP, urine microalbumin:Cr ratio and PSA were wnl, CBC with stable anemia with H/H 11 9/37 0 (MCV 80), WBC 11 8, plt wnl, FLP with HDL 27 but TC/TG/LDL at goal, TSH a bit elevated at 6 260 but FT4 was wnl  Def of controlled vs uncontrolled DM was reviewed  Diet was reviewed - wgt down 4 lbs from Nov 2021  He notes decrease in appetite but denies abd pain  He is taking his DM meds as directed  He is UTD on DM foot exam (7/21) but is overdue for an eye exam   He is on a statin but ARB/ ACE  Goal FLP was d/w pt in detail  Diet/exercise reviewed as noted above  He is taking his Atorvastatin daily as directed w/o Se  He notes no stroke/TIA symptoms/CP  Pt had a repeat biopsy of cervical node to eval for poss recurrent Lymphoma  He has f/u with ENT later this week  He is following with [facility] regularly  Counts reviewed as noted above  BP at goal today/actually a bit low and meds were reviewed and no changes have occurred  He denies missing doses of meds or SE with the meds  He does not check his BP outside the office  He notes no frequent Ha's/double vision/CP  He has some dizziness intermittently  Pt has not seen Dr Bharath Juárez in some time for his A fib/CHF/CAD  He had cardiac clearance for his lymph node biopsy  He denies CP/palp/SOB/LE edema/orthopnea/PND    He is on statin/beta blocker/Lasix/ASA  He notes no issues bleeding/bruising  Review of Systems   Constitutional: Positive for appetite change and unexpected weight change  Negative for chills and fever  HENT: Positive for trouble swallowing  Negative for congestion  Eyes: Negative for pain and visual disturbance  Respiratory: Negative for cough and shortness of breath  Cardiovascular: Negative for chest pain, palpitations and leg swelling  Gastrointestinal: Negative for abdominal pain, blood in stool, constipation, diarrhea, nausea and vomiting  Endocrine: Negative for polydipsia and polyuria  Genitourinary: Negative for difficulty urinating and dysuria  Musculoskeletal: Negative for arthralgias and myalgias  Skin: Negative for rash and wound  Neurological: Negative for dizziness and headaches  Hematological: Does not bruise/bleed easily  Psychiatric/Behavioral: Negative for behavioral problems, confusion and dysphoric mood  Objective:    BP 94/60   Pulse 70   Temp 97 7 °F (36 5 °C) (Tympanic)   Ht 5' 11" (1 803 m)   Wt 72 3 kg (159 lb 6 4 oz)   BMI 22 23 kg/m²      Physical Exam  Vitals and nursing note reviewed  Constitutional:       General: He is not in acute distress  Appearance: He is well-developed  Comments: Chronically ill appearing and thin   HENT:      Head: Normocephalic and atraumatic  Right Ear: Tympanic membrane and external ear normal  There is no impacted cerumen  Left Ear: Tympanic membrane and external ear normal  There is no impacted cerumen  Eyes:      General:         Right eye: No discharge  Left eye: No discharge  Conjunctiva/sclera: Conjunctivae normal    Cardiovascular:      Rate and Rhythm: Normal rate and regular rhythm  Heart sounds: Murmur heard  Pulmonary:      Effort: Pulmonary effort is normal  No respiratory distress  Breath sounds: Normal breath sounds  No wheezing, rhonchi or rales  Abdominal:      General: There is no distension  Palpations: Abdomen is soft  Tenderness: There is no abdominal tenderness  There is no guarding or rebound  Musculoskeletal:         General: No deformity or signs of injury  Lymphadenopathy:      Cervical: No cervical adenopathy  Skin:     General: Skin is warm and dry  Coloration: Skin is not pale  Findings: No rash  Neurological:      General: No focal deficit present  Mental Status: He is alert  Mental status is at baseline  Gait: Gait normal    Psychiatric:         Mood and Affect: Mood normal          Behavior: Behavior normal          Thought Content:  Thought content normal          Judgment: Judgment normal

## 2022-01-24 NOTE — ASSESSMENT & PLAN NOTE
Lab Results   Component Value Date    HGBA1C 6 1 (H) 01/17/2022   DM type 2 without complications - controlled with A1C 6 1 - con't current Metformin, con't healthy diet, on statin but no ACE/ARB, UTD on DM foot exam (7/21) and urged to have DM eye exam, recheck BW in 6 mo - order given

## 2022-01-24 NOTE — PATIENT INSTRUCTIONS
Medicare Preventive Visit Patient Instructions  Thank you for completing your Welcome to Medicare Visit or Medicare Annual Wellness Visit today  Your next wellness visit will be due in one year (1/25/2023)  The screening/preventive services that you may require over the next 5-10 years are detailed below  Some tests may not apply to you based off risk factors and/or age  Screening tests ordered at today's visit but not completed yet may show as past due  Also, please note that scanned in results may not display below  Preventive Screenings:  Service Recommendations Previous Testing/Comments   Colorectal Cancer Screening  · Colonoscopy    · Fecal Occult Blood Test (FOBT)/Fecal Immunochemical Test (FIT)  · Fecal DNA/Cologuard Test  · Flexible Sigmoidoscopy Age: 54-65 years old   Colonoscopy: every 10 years (May be performed more frequently if at higher risk)  OR  FOBT/FIT: every 1 year  OR  Cologuard: every 3 years  OR  Sigmoidoscopy: every 5 years  Screening may be recommended earlier than age 48 if at higher risk for colorectal cancer  Also, an individualized decision between you and your healthcare provider will decide whether screening between the ages of 74-80 would be appropriate   Colonoscopy: 01/06/2021  FOBT/FIT: Not on file  Cologuard: Not on file  Sigmoidoscopy: Not on file    History Colorectal Cancer     Prostate Cancer Screening Individualized decision between patient and health care provider in men between ages of 53-78   Medicare will cover every 12 months beginning on the day after your 50th birthday PSA: 1 4 ng/mL     Screening Not Indicated     Hepatitis C Screening Once for adults born between 1945 and 1965  More frequently in patients at high risk for Hepatitis C Hep C Antibody: Not on file        Diabetes Screening 1-2 times per year if you're at risk for diabetes or have pre-diabetes Fasting glucose: No results in last 5 years   A1C: 6 1 %    Screening Not Indicated  History Diabetes Cholesterol Screening Once every 5 years if you don't have a lipid disorder  May order more often based on risk factors  Lipid panel: 01/17/2022    Screening Current      Other Preventive Screenings Covered by Medicare:  1  Abdominal Aortic Aneurysm (AAA) Screening: covered once if your at risk  You're considered to be at risk if you have a family history of AAA or a male between the age of 73-68 who smoking at least 100 cigarettes in your lifetime  2  Lung Cancer Screening: covers low dose CT scan once per year if you meet all of the following conditions: (1) Age 50-69; (2) No signs or symptoms of lung cancer; (3) Current smoker or have quit smoking within the last 15 years; (4) You have a tobacco smoking history of at least 30 pack years (packs per day x number of years you smoked); (5) You get a written order from a healthcare provider  3  Glaucoma Screening: covered annually if you're considered high risk: (1) You have diabetes OR (2) Family history of glaucoma OR (3)  aged 48 and older OR (3)  American aged 72 and older  3  Osteoporosis Screening: covered every 2 years if you meet one of the following conditions: (1) Have a vertebral abnormality; (2) On glucocorticoid therapy for more than 3 months; (3) Have primary hyperparathyroidism; (4) On osteoporosis medications and need to assess response to drug therapy  5  HIV Screening: covered annually if you're between the age of 12-76  Also covered annually if you are younger than 13 and older than 72 with risk factors for HIV infection  For pregnant patients, it is covered up to 3 times per pregnancy      Immunizations:  Immunization Recommendations   Influenza Vaccine Annual influenza vaccination during flu season is recommended for all persons aged >= 6 months who do not have contraindications   Pneumococcal Vaccine (Prevnar and Pneumovax)  * Prevnar = PCV13  * Pneumovax = PPSV23 Adults 25-60 years old: 1-3 doses may be recommended based on certain risk factors  Adults 72 years old: Prevnar (PCV13) vaccine recommended followed by Pneumovax (PPSV23) vaccine  If already received PPSV23 since turning 65, then PCV13 recommended at least one year after PPSV23 dose  Hepatitis B Vaccine 3 dose series if at intermediate or high risk (ex: diabetes, end stage renal disease, liver disease)   Tetanus (Td) Vaccine - COST NOT COVERED BY MEDICARE PART B Following completion of primary series, a booster dose should be given every 10 years to maintain immunity against tetanus  Td may also be given as tetanus wound prophylaxis  Tdap Vaccine - COST NOT COVERED BY MEDICARE PART B Recommended at least once for all adults  For pregnant patients, recommended with each pregnancy  Shingles Vaccine (Shingrix) - COST NOT COVERED BY MEDICARE PART B  2 shot series recommended in those aged 48 and above     Health Maintenance Due:      Topic Date Due    Hepatitis C Screening  Never done    Lung Cancer Screening  10/20/2022     Immunizations Due:      Topic Date Due    COVID-19 Vaccine (1) Never done     Advance Directives   What are advance directives? Advance directives are legal documents that state your wishes and plans for medical care  These plans are made ahead of time in case you lose your ability to make decisions for yourself  Advance directives can apply to any medical decision, such as the treatments you want, and if you want to donate organs  What are the types of advance directives? There are many types of advance directives, and each state has rules about how to use them  You may choose a combination of any of the following:  · Living will: This is a written record of the treatment you want  You can also choose which treatments you do not want, which to limit, and which to stop at a certain time  This includes surgery, medicine, IV fluid, and tube feedings  · Durable power of  for healthcare [facility]):   This is a written record that states who you want to make healthcare choices for you when you are unable to make them for yourself  This person, called a proxy, is usually a family member or a friend  You may choose more than 1 proxy  · Do not resuscitate (DNR) order:  A DNR order is used in case your heart stops beating or you stop breathing  It is a request not to have certain forms of treatment, such as CPR  A DNR order may be included in other types of advance directives  · Medical directive: This covers the care that you want if you are in a coma, near death, or unable to make decisions for yourself  You can list the treatments you want for each condition  Treatment may include pain medicine, surgery, blood transfusions, dialysis, IV or tube feedings, and a ventilator (breathing machine)  · Values history: This document has questions about your views, beliefs, and how you feel and think about life  This information can help others choose the care that you would choose  Why are advance directives important? An advance directive helps you control your care  Although spoken wishes may be used, it is better to have your wishes written down  Spoken wishes can be misunderstood, or not followed  Treatments may be given even if you do not want them  An advance directive may make it easier for your family to make difficult choices about your care  Fall Prevention    Fall prevention  includes ways to make your home and other areas safer  It also includes ways you can move more carefully to prevent a fall  Health conditions that cause changes in your blood pressure, vision, or muscle strength and coordination may increase your risk for falls  Medicines may also increase your risk for falls if they make you dizzy, weak, or sleepy  Fall prevention tips:   · Stand or sit up slowly  · Use assistive devices as directed  · Wear shoes that fit well and have soles that   · Wear a personal alarm  · Stay active      · Manage your medical conditions  Home Safety Tips:  · Add items to prevent falls in the bathroom  · Keep paths clear  · Install bright lights in your home  · Keep items you use often on shelves within reach  · Paint or place reflective tape on the edges of your stairs  © Copyright [employer] 2018 Information is for End User's use only and may not be sold, redistributed or otherwise used for commercial purposes  All illustrations and images included in CareNotes® are the copyrighted property of [employer]  or [employer] [facility] Preventive Visit Patient Instructions  Thank you for completing your Welcome to Medicare Visit or Medicare Annual Wellness Visit today  Your next wellness visit will be due in one year (1/25/2023)  The screening/preventive services that you may require over the next 5-10 years are detailed below  Some tests may not apply to you based off risk factors and/or age  Screening tests ordered at today's visit but not completed yet may show as past due  Also, please note that scanned in results may not display below  Preventive Screenings:  Service Recommendations Previous Testing/Comments   Colorectal Cancer Screening  · Colonoscopy    · Fecal Occult Blood Test (FOBT)/Fecal Immunochemical Test (FIT)  · Fecal DNA/Cologuard Test  · Flexible Sigmoidoscopy Age: 54-65 years old   Colonoscopy: every 10 years (May be performed more frequently if at higher risk)  OR  FOBT/FIT: every 1 year  OR  Cologuard: every 3 years  OR  Sigmoidoscopy: every 5 years  Screening may be recommended earlier than age 48 if at higher risk for colorectal cancer  Also, an individualized decision between you and your healthcare provider will decide whether screening between the ages of 74-80 would be appropriate   Colonoscopy: 01/06/2021  FOBT/FIT: Not on file  Cologuard: Not on file  Sigmoidoscopy: Not on file    History Colorectal Cancer     Prostate Cancer Screening Individualized decision between patient and health care provider in men between ages of 53-78   Medicare will cover every 12 months beginning on the day after your 50th birthday PSA: 1 4 ng/mL     Screening Not Indicated     Hepatitis C Screening Once for adults born between 1945 and 1965  More frequently in patients at high risk for Hepatitis C Hep C Antibody: Not on file        Diabetes Screening 1-2 times per year if you're at risk for diabetes or have pre-diabetes Fasting glucose: No results in last 5 years   A1C: 6 1 %    Screening Not Indicated  History Diabetes   Cholesterol Screening Once every 5 years if you don't have a lipid disorder  May order more often based on risk factors  Lipid panel: 01/17/2022    Screening Current      Other Preventive Screenings Covered by Medicare:  6  Abdominal Aortic Aneurysm (AAA) Screening: covered once if your at risk  You're considered to be at risk if you have a family history of AAA or a male between the age of 73-68 who smoking at least 100 cigarettes in your lifetime  7  Lung Cancer Screening: covers low dose CT scan once per year if you meet all of the following conditions: (1) Age 50-69; (2) No signs or symptoms of lung cancer; (3) Current smoker or have quit smoking within the last 15 years; (4) You have a tobacco smoking history of at least 30 pack years (packs per day x number of years you smoked); (5) You get a written order from a healthcare provider  8  Glaucoma Screening: covered annually if you're considered high risk: (1) You have diabetes OR (2) Family history of glaucoma OR (3)  aged 48 and older OR (3)  American aged 72 and older  5  Osteoporosis Screening: covered every 2 years if you meet one of the following conditions: (1) Have a vertebral abnormality; (2) On glucocorticoid therapy for more than 3 months; (3) Have primary hyperparathyroidism; (4) On osteoporosis medications and need to assess response to drug therapy    10  HIV Screening: covered annually if you're between the age of 12-76  Also covered annually if you are younger than 13 and older than 72 with risk factors for HIV infection  For pregnant patients, it is covered up to 3 times per pregnancy  Immunizations:  Immunization Recommendations   Influenza Vaccine Annual influenza vaccination during flu season is recommended for all persons aged >= 6 months who do not have contraindications   Pneumococcal Vaccine (Prevnar and Pneumovax)  * Prevnar = PCV13  * Pneumovax = PPSV23 Adults 25-60 years old: 1-3 doses may be recommended based on certain risk factors  Adults 72 years old: Prevnar (PCV13) vaccine recommended followed by Pneumovax (PPSV23) vaccine  If already received PPSV23 since turning 65, then PCV13 recommended at least one year after PPSV23 dose  Hepatitis B Vaccine 3 dose series if at intermediate or high risk (ex: diabetes, end stage renal disease, liver disease)   Tetanus (Td) Vaccine - COST NOT COVERED BY MEDICARE PART B Following completion of primary series, a booster dose should be given every 10 years to maintain immunity against tetanus  Td may also be given as tetanus wound prophylaxis  Tdap Vaccine - COST NOT COVERED BY MEDICARE PART B Recommended at least once for all adults  For pregnant patients, recommended with each pregnancy  Shingles Vaccine (Shingrix) - COST NOT COVERED BY MEDICARE PART B  2 shot series recommended in those aged 48 and above     Health Maintenance Due:      Topic Date Due    Hepatitis C Screening  Never done    Lung Cancer Screening  10/20/2022     Immunizations Due:      Topic Date Due    COVID-19 Vaccine (1) Never done     Advance Directives   What are advance directives? Advance directives are legal documents that state your wishes and plans for medical care  These plans are made ahead of time in case you lose your ability to make decisions for yourself   Advance directives can apply to any medical decision, such as the treatments you want, and if you want to donate organs  What are the types of advance directives? There are many types of advance directives, and each state has rules about how to use them  You may choose a combination of any of the following:  · Living will: This is a written record of the treatment you want  You can also choose which treatments you do not want, which to limit, and which to stop at a certain time  This includes surgery, medicine, IV fluid, and tube feedings  · Durable power of  for healthcare [facility]): This is a written record that states who you want to make healthcare choices for you when you are unable to make them for yourself  This person, called a proxy, is usually a family member or a friend  You may choose more than 1 proxy  · Do not resuscitate (DNR) order:  A DNR order is used in case your heart stops beating or you stop breathing  It is a request not to have certain forms of treatment, such as CPR  A DNR order may be included in other types of advance directives  · Medical directive: This covers the care that you want if you are in a coma, near death, or unable to make decisions for yourself  You can list the treatments you want for each condition  Treatment may include pain medicine, surgery, blood transfusions, dialysis, IV or tube feedings, and a ventilator (breathing machine)  · Values history: This document has questions about your views, beliefs, and how you feel and think about life  This information can help others choose the care that you would choose  Why are advance directives important? An advance directive helps you control your care  Although spoken wishes may be used, it is better to have your wishes written down  Spoken wishes can be misunderstood, or not followed  Treatments may be given even if you do not want them  An advance directive may make it easier for your family to make difficult choices about your care     Fall Prevention    Fall prevention  includes ways to make your home and other areas safer  It also includes ways you can move more carefully to prevent a fall  Health conditions that cause changes in your blood pressure, vision, or muscle strength and coordination may increase your risk for falls  Medicines may also increase your risk for falls if they make you dizzy, weak, or sleepy  Fall prevention tips:   · Stand or sit up slowly  · Use assistive devices as directed  · Wear shoes that fit well and have soles that   · Wear a personal alarm  · Stay active  · Manage your medical conditions  Home Safety Tips:  · Add items to prevent falls in the bathroom  · Keep paths clear  · Install bright lights in your home  · Keep items you use often on shelves within reach  · Paint or place reflective tape on the edges of your stairs  © Copyright [employer] 2018 Information is for End User's use only and may not be sold, redistributed or otherwise used for commercial purposes   All illustrations and images included in CareNotes® are the copyrighted property of [employer]  or [address] [place] [address] [employer]

## 2022-01-24 NOTE — ASSESSMENT & PLAN NOTE
Stable, on iron supplement, con't regular BW as per Heme/Onc, call with any significant s/sx of anemia

## 2022-01-24 NOTE — PROGRESS NOTES
Assessment and Plan:     Problem List Items Addressed This Visit     None          Depression Screening and Follow-up Plan: Patient was screened for depression during today's encounter  They screened negative with a PHQ-2 score of 0  Preventive health issues were discussed with patient, and age appropriate screening tests were ordered as noted in patient's After Visit Summary  Personalized health advice and appropriate referrals for health education or preventive services given if needed, as noted in patient's After Visit Summary       History of Present Illness:     Patient presents for Medicare Annual Wellness visit    Patient Care Team:  Maricruz Gaines DO as PCP - General (Internal Medicine)  Maricruz Gaines DO as PCP - MD Roddy Martell MD (Cardiology)  Luis A Mercer as Nurse Practitioner (Gastroenterology)  Shaggy Zhao MD (Hematology and Oncology)     Problem List:     Patient Active Problem List   Diagnosis    Basal cell carcinoma of skin    Coronary artery disease involving native heart with angina pectoris, unspecified vessel or lesion type [facility]    Type 2 diabetes mellitus without complication, without long-term current use of insulin ([facility]    Dyslipidemia    Erectile dysfunction    Fatty liver    Lymphoma ([facility]    Malignant tumor of cecum ([facility]    Multiple myeloma not having achieved remission ([facility]    Primary localized osteoarthritis of right knee    Hx of CABG    Essential hypertension    S/P total knee arthroplasty, left    Nocturnal hypoxia    Chronic systolic congestive heart failure ([facility]    History of aortic valve replacement with bioprosthetic valve    Leukocytosis    Paroxysmal atrial fibrillation ([facility]    Dysphagia    Personal history of colon cancer    Roberts's esophagus without dysplasia    Schatzki's ring of distal esophagus    Gastroesophageal reflux disease with esophagitis    Acute on chronic blood loss anemia    Mouth ulcers      Past Medical and Surgical History:     Past Medical History:   Diagnosis Date    Atrial fibrillation ([facility]     Cataract     Colitis     Colon cancer ([facility]     Fatty liver     History of chemotherapy     History of radiation therapy     History of shingles 2018    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[facility]     Pneumonia     Skin cancer      Past Surgical History:   Procedure Laterality Date    AORTIC VALVE REPLACEMENT      COLECTOMY      COLONOSCOPY  2019    Prior right hemicolectomy    CORONARY ARTERY BYPASS GRAFT      DENTAL SURGERY      KNEE SURGERY      LYMPHADENECTOMY      OTHER SURGICAL HISTORY      MAZE PROCEDURE    NH TOTAL KNEE ARTHROPLASTY Left 2019    Procedure: ARTHROPLASTY LEFT KNEE TOTAL;  Surgeon: Ana Lilia Jerome MD;  Location: [facility];  Service: Orthopedics    SKIN BIOPSY      UPPER GASTROINTESTINAL ENDOSCOPY  2019    Schatzki ring    US GUIDED LYMPH NODE BIOPSY RIGHT  2021      Family History:     Family History   Problem Relation Age of Onset    Heart block Family     Coronary artery disease Mother     Thrombosis Mother     Hypertension Mother    [place]am[place] Arthritis Mother     Hyperlipidemia Mother     Diabetes Father     Hypertension Father     Arthritis Father     Sudden death Father         cardiac    Colon cancer Paternal Grandfather     Breast cancer Sister     Heart disease Brother         Coronary stents      Social History:     Social History     Socioeconomic History    Marital status: /Civil Union     Spouse name: None    Number of children: None    Years of education: None    Highest education level: None   Occupational History    Occupation: WORKING FULLTIME    Tobacco Use    Smoking status: Former Smoker     Packs/day: 1 00     Years: 40 00     Pack years: 40 00     Types: Cigarettes     Start date:      Quit date:      Years since quittin 0    Smokeless tobacco: Never Used   Vaping Use    Vaping Use: Never used   Substance and Sexual Activity    Alcohol use: Not Currently     Comment: very rare "beer here and there"    Drug use: No    Sexual activity: None   Other Topics Concern    None   Social History Narrative    None     Social Determinants of Health     Financial Resource Strain: Not on file   Food Insecurity: Not on file   Transportation Needs: Not on file   Physical Activity: Not on file   Stress: Not on file   Social Connections: Not on file   Intimate Partner Violence: Not on file   Housing Stability: Not on file      Medications and Allergies:     Current Outpatient Medications   Medication Sig Dispense Refill    al mag oxide-diphenhydramine-lidocaine viscous (MAGIC MOUTHWASH) 1:1:1 suspension Swish and spit 10 mL every 6 (six) hours as needed for mouth pain or discomfort 90 mL 1    aspirin 81 MG tablet Take 1 tablet (81 mg total) by mouth daily      atorvastatin (LIPITOR) 40 mg tablet TAKE 1 TABLET BY MOUTH EVERY DAY 90 tablet 3    cholecalciferol (VITAMIN D3) 1,000 units tablet Take 2 tablets (2,000 Units total) by mouth daily 60 tablet 0    clobetasol (TEMOVATE) 0 05 % ointment Apply topically to rash as needed      ferrous sulfate 324 MG TBEC Take 1 tablet (324 mg total) by mouth daily with breakfast 30 tablet 2    furosemide (LASIX) 20 mg tablet TAKE 1 TABLET BY MOUTH TWICE A  tablet 1    metFORMIN (GLUCOPHAGE-XR) 500 mg 24 hr tablet TAKE 1 TABLET BY MOUTH EVERY DAY WITH DINNER 90 tablet 2    metoprolol succinate (TOPROL-XL) 100 mg 24 hr tablet TAKE 1 TABLET BY MOUTH EVERY DAY 90 tablet 1    multivitamin-minerals (CENTRUM ADULTS) tablet Take 1 tablet by mouth daily  0    nitroglycerin (NITROSTAT) 0 3 mg SL tablet Nitrostat 0 3 mg sublingual tablet   Place 1 tablet as needed by sublingual route as needed for 90 days        omeprazole (PriLOSEC) 40 MG capsule Take 1 capsule (40 mg total) by mouth daily 90 capsule 3    saccharomyces boulardii (FLORASTOR) 250 mg capsule Take 1 capsule (250 mg total) by mouth 2 (two) times a day 60 capsule 1     No current facility-administered medications for this visit  Allergies   Allergen Reactions    Ceftin [Cefuroxime] Itching     However, has tolerated Cefazolin and Pip-Tazo since, which have different side chains  Be cautious with / avoid 2nd, 3rd, or 4th Gen Cephs that have similar side chains to Cefuroxime   Lantus [Insulin Glargine] Itching      Immunizations:     Immunization History   Administered Date(s) Administered    INFLUENZA 09/18/2012, 09/01/2013, 10/01/2015, 10/01/2015, 11/28/2016, 11/03/2017, 10/25/2018    Influenza Split High Dose Preservative Free IM 09/18/2012, 10/07/2014, 09/30/2015, 11/28/2016, 11/03/2017    Influenza, high dose seasonal 0 7 mL 09/23/2019, 09/11/2020, 09/21/2021    Influenza, seasonal, injectable 11/01/2014    Pneumococcal 01/01/2013    Pneumococcal Conjugate 13-Valent 02/15/2016, 02/10/2017    Pneumococcal Polysaccharide PPV23 10/01/2003, 09/18/2012    Tdap 06/10/2014    Varicella 01/01/2014    Zoster 07/27/2014      Health Maintenance:         Topic Date Due    Hepatitis C Screening  Never done    Lung Cancer Screening  10/20/2022         Topic Date Due    COVID-19 Vaccine (1) Never done      Medicare Health Risk Assessment:     There were no vitals taken for this visit  Surya Loya is here for his Subsequent Wellness visit  Last Medicare Wellness visit information reviewed, patient interviewed and updates made to the record today  Health Risk Assessment:   Patient rates overall health as fair  Patient feels that their physical health rating is same  Patient is satisfied with their life  Eyesight was rated as same  Hearing was rated as same  Patient feels that their emotional and mental health rating is same  Patients states they are never, rarely angry  Patient states they are always unusually tired/fatigued  Pain experienced in the last 7 days has been none   Patient states that he has experienced no weight loss or gain in last 6 months  Depression Screening:   PHQ-2 Score: 0      Fall Risk Screening: In the past year, patient has experienced: history of falling in past year    Number of falls: 1  Injured during fall?: No    Feels unsteady when standing or walking?: No    Worried about falling?: No      Home Safety:  Patient does not have trouble with stairs inside or outside of their home  Patient has working smoke alarms and has working carbon monoxide detector  Home safety hazards include: none  Nutrition:   Current diet is Regular  Medications:   Patient is currently taking over-the-counter supplements  OTC medications include: see medication list  Patient is able to manage medications  Activities of Daily Living (ADLs)/Instrumental Activities of Daily Living (IADLs):   Walk and transfer into and out of bed and chair?: Yes  Dress and groom yourself?: Yes    Bathe or shower yourself?: Yes    Feed yourself? Yes  Do your laundry/housekeeping?: Yes  Manage your money, pay your bills and track your expenses?: Yes  Make your own meals?: Yes    Do your own shopping?: Yes    Previous Hospitalizations:   Any hospitalizations or ED visits within the last 12 months?: Yes    How many hospitalizations have you had in the last year?: 3-4    Hospitalization Comments: 3 hospitalizations    Advance Care Planning:   Living will: Yes    Durable POA for healthcare:  Yes    Advanced directive: Yes      Cognitive Screening:   Provider or family/friend/caregiver concerned regarding cognition?: No    PREVENTIVE SCREENINGS      Cardiovascular Screening:    General: Screening Current and Risks and Benefits Discussed      Diabetes Screening:     General: History Diabetes, Screening Current and Risks and Benefits Discussed      Colorectal Cancer Screening:     General: History Colorectal Cancer, Risks and Benefits Discussed and Screening Current      Prostate Cancer Screening:    General: Risks and Benefits Discussed and Screening Current      Osteoporosis Screening:    General: Risks and Benefits Discussed      Abdominal Aortic Aneurysm (AAA) Screening:    Risk factors include: age between 73-69 yo and tobacco use        Lung Cancer Screening:     General: Screening Current and Risks and Benefits Discussed      Hepatitis C Screening:    General: Risks and Benefits Discussed    Hep C Screening Accepted: Yes      Screening, Brief Intervention, and Referral to Treatment (SBIRT)    Screening  Typical number of drinks in a day: 0  Typical number of drinks in a week: 0  Interpretation: Low risk drinking behavior      Single Item Drug Screening:  How often have you used an illegal drug (including marijuana) or a prescription medication for non-medical reasons in the past year? never    Single Item Drug Screen Score: 0  Interpretation: Negative screen for possible drug use disorder      [facility], DO

## 2022-01-24 NOTE — ASSESSMENT & PLAN NOTE
Wt Readings from Last 3 Encounters:   01/24/22 72 3 kg (159 lb 6 4 oz)   01/18/22 70 8 kg (156 lb)   01/13/22 73 9 kg (163 lb)     No current s/sx of decompensation, on lasix/beta blocker/statin/ASA, urged to f/u with Cardio as it has been some time, call with CV symptoms

## 2022-02-04 ENCOUNTER — OFFICE VISIT (OUTPATIENT)
Dept: PULMONOLOGY | Facility: HOSPITAL | Age: 77
End: 2022-02-04
Payer: COMMERCIAL

## 2022-02-04 VITALS
SYSTOLIC BLOOD PRESSURE: 124 MMHG | RESPIRATION RATE: 18 BRPM | TEMPERATURE: 97.8 F | BODY MASS INDEX: 22.68 KG/M2 | HEART RATE: 72 BPM | DIASTOLIC BLOOD PRESSURE: 60 MMHG | HEIGHT: 71 IN | WEIGHT: 162 LBS | OXYGEN SATURATION: 98 %

## 2022-02-04 DIAGNOSIS — G47.34 NOCTURNAL HYPOXIA: ICD-10-CM

## 2022-02-04 DIAGNOSIS — Z86.16 HISTORY OF COVID-19: ICD-10-CM

## 2022-02-04 DIAGNOSIS — R93.89 ABNORMAL CHEST CT: Primary | ICD-10-CM

## 2022-02-04 PROCEDURE — 1036F TOBACCO NON-USER: CPT | Performed by: PHYSICIAN ASSISTANT

## 2022-02-04 PROCEDURE — 99214 OFFICE O/P EST MOD 30 MIN: CPT | Performed by: PHYSICIAN ASSISTANT

## 2022-02-04 PROCEDURE — 3078F DIAST BP <80 MM HG: CPT | Performed by: PHYSICIAN ASSISTANT

## 2022-02-04 PROCEDURE — 1160F RVW MEDS BY RX/DR IN RCRD: CPT | Performed by: PHYSICIAN ASSISTANT

## 2022-02-04 PROCEDURE — 3074F SYST BP LT 130 MM HG: CPT | Performed by: PHYSICIAN ASSISTANT

## 2022-02-04 RX ORDER — KETOROLAC TROMETHAMINE 5 MG/ML
SOLUTION OPHTHALMIC
COMMUNITY
Start: 2021-11-24 | End: 2022-04-14

## 2022-02-04 RX ORDER — PREDNISOLONE ACETATE 10 MG/ML
SUSPENSION/ DROPS OPHTHALMIC
COMMUNITY
Start: 2021-11-24 | End: 2022-04-14

## 2022-02-04 RX ORDER — MOXIFLOXACIN 5 MG/ML
SOLUTION/ DROPS OPHTHALMIC
COMMUNITY
Start: 2021-11-24 | End: 2022-04-14

## 2022-02-04 NOTE — PROGRESS NOTES
Answers for HPI/ROS submitted by the patient on 1/28/2022  Which of the following makes your symptoms better?: rest    Assessment & Plan:      1  Abnormal chest CT     2  History of COVID-19     3  Nocturnal hypoxia         · Patient presenting for follow-up, he is doing very well from a pulmonary standpoint  No longer on any supplemental oxygen and fully recovered from previous COVID-19 pneumonia  Recently had lymph node biopsy negative for malignancy  · No issues with shortness of breath  Not currently on any bronchodilators or steroids  · We will scan PET CT results into our system and evaluate chest findings to determine if he needs any follow-up imaging from our standpoint  He says that his oncologist is going to arrange a repeat PET scan at some point as well  · If there are no significant lung findings, patient does not likely need to continue following up with our office  · Received COVID-19 booster    Subjective:     Patient ID: Sena Leiva is a 77 y o  male  Chief Complaint:    Angela Macario is a very pleasant 81-year-old male with past medical history including lymphoma, COVID-19 pneumonia, hypoxia, CAD, AFib, hypertension presenting for follow-up  Patient recently went for lymph node biopsy which was thankfully negative for malignancy  He says that he is doing very well with his breathing  Denies shortness of breath or cough  He has returned his supplemental oxygen  He does not use inhalers  Associated symptoms include fatigue  Pertinent negatives include no chills, coughing or fever  The following portions of the patient's history were reviewed in this encounter and updated as appropriate:   Review of Systems   Constitutional: Positive for fatigue  Negative for chills and fever  Respiratory: Negative for cough and shortness of breath  All other systems reviewed and are negative          Objective:  Vitals:    02/04/22 1333   BP: 124/60   BP Location: Right arm   Patient Position: Sitting   Cuff Size: Adult   Pulse: 72   Resp: 18   Temp: 97 8 °F (36 6 °C)   TempSrc: Tympanic   SpO2: 98%   Weight: 73 5 kg (162 lb)   Height: 5' 11" (1 803 m)       Physical Exam  Vitals and nursing note reviewed  Constitutional:       General: He is not in acute distress  Appearance: He is well-developed  He is not diaphoretic  HENT:      Head: Normocephalic and atraumatic  Right Ear: External ear normal       Left Ear: External ear normal    Eyes:      General: No scleral icterus  Right eye: No discharge  Left eye: No discharge  Conjunctiva/sclera: Conjunctivae normal    Neck:      Trachea: No tracheal deviation  Cardiovascular:      Rate and Rhythm: Normal rate and regular rhythm  Heart sounds: Murmur heard  No friction rub  No gallop  Pulmonary:      Effort: Pulmonary effort is normal  No respiratory distress  Breath sounds: Normal breath sounds  No stridor  No wheezing  Abdominal:      General: There is no distension  Tenderness: There is no guarding  Musculoskeletal:         General: No tenderness or deformity  Normal range of motion  Cervical back: Normal range of motion and neck supple  Skin:     General: Skin is warm and dry  Coloration: Skin is not pale  Findings: No erythema or rash  Neurological:      Mental Status: He is alert and oriented to person, place, and time  Cranial Nerves: No cranial nerve deficit  Motor: No abnormal muscle tone  Psychiatric:         Behavior: Behavior normal          Thought Content:  Thought content normal          Judgment: Judgment normal          Lab Review:   Lab Requisition on 01/20/2022   Component Date Value    Case Report 01/20/2022                      Value:Surgical Pathology Report                         Case: [id number]                                   Authorizing Provider:  Mikel Junior MD          Collected:           01/20/2022                   Ordering Location: [facility]      Received:            01/20/2022 [address] Laboratory                                                          Pathologist:           Beau Dias MD                                                        Specimen:    Lymph Node, Right Neck LN X2 Level 5                                                       Final Diagnosis 01/20/2022                      Value: This result contains rich text formatting which cannot be displayed here   Note 01/20/2022                      Value: This result contains rich text formatting which cannot be displayed here   Additional Information 01/20/2022                      Value: This result contains rich text formatting which cannot be displayed here  Chikis Castellano Gross Description 01/20/2022                      Value: This result contains rich text formatting which cannot be displayed here   Scan Result 01/20/2022 SEE [facility] Outpatient Visit on 12/14/2021   Component Date Value    Case Report 12/14/2021                      Value:Non-gynecologic Cytology                          Case: [id number]                                  Authorizing Provider:  Nilo Kapoor MD          Collected:           12/14/2021 1439              Ordering Location:     [address]     Received:            12/14/2021 [address] [facility] Ultrasound                                                            Pathologist:           Trino Pinto MD                                                              Specimens:   A) - Lymph Node, rt level II                                                                        B) - Lymph Node, rt level II                                                               Final Diagnosis 12/14/2021                      Value: This result contains rich text formatting which cannot be displayed here      Note 12/14/2021 Value:This result contains rich text formatting which cannot be displayed here   Intraoperative Consultat* 12/14/2021                      Value: This result contains rich text formatting which cannot be displayed here  Tracy Noble Description 12/14/2021                      Value: This result contains rich text formatting which cannot be displayed here   Additional Information 12/14/2021                      Value: This result contains rich text formatting which cannot be displayed here      Scan Result 12/14/2021 SEE WRITTEN REPORT FROM [facility]

## 2022-02-09 ENCOUNTER — DOCUMENTATION (OUTPATIENT)
Dept: PULMONOLOGY | Facility: CLINIC | Age: 77
End: 2022-02-09

## 2022-02-09 NOTE — PROGRESS NOTES
I received the imaging disc back from [facility] radiology they state the PET scan can not be uploaded  I will send back to the patient   c

## 2022-03-17 DIAGNOSIS — R13.19 ESOPHAGEAL DYSPHAGIA: ICD-10-CM

## 2022-03-17 DIAGNOSIS — K22.70 BARRETT'S ESOPHAGUS WITHOUT DYSPLASIA: ICD-10-CM

## 2022-03-17 DIAGNOSIS — K21.00 GASTROESOPHAGEAL REFLUX DISEASE WITH ESOPHAGITIS: ICD-10-CM

## 2022-03-17 DIAGNOSIS — K22.2 SCHATZKI'S RING OF DISTAL ESOPHAGUS: ICD-10-CM

## 2022-03-17 RX ORDER — OMEPRAZOLE 40 MG/1
40 CAPSULE, DELAYED RELEASE ORAL DAILY
Qty: 90 CAPSULE | Refills: 2 | Status: SHIPPED | OUTPATIENT
Start: 2022-03-17

## 2022-03-18 LAB
LEFT EYE DIABETIC RETINOPATHY: NORMAL
RIGHT EYE DIABETIC RETINOPATHY: NORMAL

## 2022-03-30 DIAGNOSIS — I10 ESSENTIAL HYPERTENSION: ICD-10-CM

## 2022-03-30 DIAGNOSIS — I48.0 PAROXYSMAL ATRIAL FIBRILLATION (HCC): ICD-10-CM

## 2022-03-30 DIAGNOSIS — I25.10 CORONARY ARTERY DISEASE INVOLVING NATIVE CORONARY ARTERY OF NATIVE HEART WITHOUT ANGINA PECTORIS: ICD-10-CM

## 2022-03-30 DIAGNOSIS — I50.32 CHRONIC DIASTOLIC CONGESTIVE HEART FAILURE (HCC): ICD-10-CM

## 2022-03-30 RX ORDER — METOPROLOL SUCCINATE 100 MG/1
TABLET, EXTENDED RELEASE ORAL
Qty: 90 TABLET | Refills: 1 | Status: SHIPPED | OUTPATIENT
Start: 2022-03-30

## 2022-03-31 PROCEDURE — 88173 CYTOPATH EVAL FNA REPORT: CPT | Performed by: PATHOLOGY

## 2022-03-31 PROCEDURE — 88342 IMHCHEM/IMCYTCHM 1ST ANTB: CPT | Performed by: PATHOLOGY

## 2022-03-31 PROCEDURE — 88305 TISSUE EXAM BY PATHOLOGIST: CPT | Performed by: PATHOLOGY

## 2022-03-31 PROCEDURE — 88344 IMHCHEM/IMCYTCHM EA MLT ANTB: CPT | Performed by: PATHOLOGY

## 2022-04-08 NOTE — H&P (VIEW-ONLY)
[facility] Follow up visit      CC: neck mass  Independent historian:       Time interval of problem since last visit:      Pertinent interval elements of the history:  Waxing and waning bilateral lymph nodes  Mild R throat pain    Review of any relevant imaging: images from any scan reviewed personally  Scans: PET  Labs: FNAB, path  Notes:     Interval Review of systems:  General: no weight change, normal energy  CV: no palpitations or chest pain  Pulm: no shortness of breath    Interval Social History:  Social History     Socioeconomic History    Marital status: /Civil Union     Spouse name: Not on file    Number of children: Not on file    Years of education: Not on file    Highest education level: Not on file   Occupational History    Occupation: WORKING FULLTIME    Tobacco Use    Smoking status: Former Smoker     Packs/day: 1 00     Years: 40 00     Pack years: 40 00     Types: Cigarettes     Start date:      Quit date:      Years since quittin 2    Smokeless tobacco: Never Used   Vaping Use    Vaping Use: Never used   Substance and Sexual Activity    Alcohol use: Not Currently     Comment: very rare "beer here and there"    Drug use: No    Sexual activity: Not on file   Other Topics Concern    Not on file   Social History Narrative    Not on file     Social Determinants of Health     Financial Resource Strain: Not on file   Food Insecurity: Not on file   Transportation Needs: Not on file   Physical Activity: Not on file   Stress: Not on file   Social Connections: Not on file   Intimate Partner Violence: Not on file   Housing Stability: Not on file       Interval Physical Examination:  Temp (!) 96 5 °F (35 8 °C)   Ht 5' 11" (1 803 m)   Wt 73 5 kg (162 lb)   BMI 22 59 kg/m²   NAD  Bilateral lymphadenopathy, level II, III, V - stable  Voice stable        Assessment:  1  Lymphadenopathy of head and neck     2   Other type of follicular lymphoma, unspecified body [facility])     3  Multiple myeloma not having achieved remission ([facility] )         Plan:  1   SCCa v  Lymphoma    FNAB path - atpyical cells    Plan for MDL with biopsy, frozen section, possible R neck LNB    We discussed treatment for both SCCA and lymphoma  Options would be limited for SCCA given his hx of neck radiation in 1998    F/u for OR scheduling

## 2022-04-11 ENCOUNTER — TELEPHONE (OUTPATIENT)
Dept: PULMONOLOGY | Facility: CLINIC | Age: 77
End: 2022-04-11

## 2022-04-11 NOTE — TELEPHONE ENCOUNTER
Established patient needing pre-op clearance  Surgery: DIRECT LARYNGOSCOPY WITH BIOPSY RIGHT PHARYNX, POSSIBLE RIGHT NECK LYMPH NODE BIOPSY (Right Throat)       DISSECTION [facility] NODE NECK (N/A Face)  Surgery date: 4/20/22  Last seen by us: 2/4/22  On oxygen: Yes  Kind of Sedation: General  Length of surgery: 135 minutes  Surgeon: Jennifer Viramontes  Office contact: [phone]  Fax(To send office note): [phone]    Would you like to clear patient via a phone call from last visit or would like us to schedule them with you or an AP?

## 2022-04-11 NOTE — TELEPHONE ENCOUNTER
From Iveth's last note, looks like he was doing well  If Palak May can evaluate him via phone call, that would be fine  Thanks!

## 2022-04-13 NOTE — TELEPHONE ENCOUNTER
Surgery: DIRECT LARYNGOSCOPY WITH BIOPSY RIGHT PHARYNX, POSSIBLE RIGHT NECK LYMPH NODE BIOPSY (Right Throat)       DISSECTION [facility] NODE NECK (N/A Face)  Surgery date: 4/20/22  Last seen by us: 2/4/22  On oxygen: Yes  Kind of Sedation: General  Length of surgery: 135 minutes  Surgeon: Claudette Yancey  Office contact: [phone]  Fax(To send office note): [phone]      I spoke with the patient  He continues to have no issues with shortness of breath  DOUG postoperative respiratory failure risk assessment: 6 0% risk of mechanical ventilation > 48 hrs after surgery, or unplanned intubation < 30 days of surgery     ARISCAT score for postoperative pulmonary complication: 19 pts, 6 7% low risk of in-hospital postoperative complications  No pulmonary contraindications for the procedure

## 2022-04-18 ENCOUNTER — OFFICE VISIT (OUTPATIENT)
Dept: CARDIOLOGY CLINIC | Facility: CLINIC | Age: 77
End: 2022-04-18
Payer: COMMERCIAL

## 2022-04-18 VITALS
WEIGHT: 161.6 LBS | HEIGHT: 71 IN | SYSTOLIC BLOOD PRESSURE: 110 MMHG | BODY MASS INDEX: 22.62 KG/M2 | DIASTOLIC BLOOD PRESSURE: 58 MMHG | HEART RATE: 74 BPM

## 2022-04-18 DIAGNOSIS — Z01.810 PREOP CARDIOVASCULAR EXAM: ICD-10-CM

## 2022-04-18 DIAGNOSIS — Z95.3 HISTORY OF AORTIC VALVE REPLACEMENT WITH BIOPROSTHETIC VALVE: ICD-10-CM

## 2022-04-18 DIAGNOSIS — I25.10 CORONARY ARTERY DISEASE INVOLVING NATIVE CORONARY ARTERY OF NATIVE HEART WITHOUT ANGINA PECTORIS: Primary | ICD-10-CM

## 2022-04-18 DIAGNOSIS — Z95.1 HX OF CABG: ICD-10-CM

## 2022-04-18 PROCEDURE — 1160F RVW MEDS BY RX/DR IN RCRD: CPT | Performed by: INTERNAL MEDICINE

## 2022-04-18 PROCEDURE — 99214 OFFICE O/P EST MOD 30 MIN: CPT | Performed by: INTERNAL MEDICINE

## 2022-04-18 PROCEDURE — 3074F SYST BP LT 130 MM HG: CPT | Performed by: INTERNAL MEDICINE

## 2022-04-18 PROCEDURE — 93000 ELECTROCARDIOGRAM COMPLETE: CPT | Performed by: INTERNAL MEDICINE

## 2022-04-18 PROCEDURE — 1036F TOBACCO NON-USER: CPT | Performed by: INTERNAL MEDICINE

## 2022-04-18 PROCEDURE — 3078F DIAST BP <80 MM HG: CPT | Performed by: INTERNAL MEDICINE

## 2022-04-18 NOTE — PROGRESS NOTES
Cardiology Follow Up    Michaelle Ruano  1945  [id number]  [facility] [address] [address] [phone]    1  Coronary artery disease involving native coronary artery of native heart without angina pectoris  Echo complete w/ contrast if indicated   2  Preop cardiovascular exam  POCT ECG   3  Hx of CABG     4  History of aortic valve replacement with bioprosthetic valve         Interval History: Followup CAd and preop clearance    Patient is having laryngoscopy and biopsy with ENT  Has some dyspnea and cough  No chest pain       Medical Problems             Problem List     Basal cell carcinoma of skin    Coronary artery disease involving native heart with angina pectoris, unspecified vessel or lesion type ([facility] [address] )    Type 2 diabetes mellitus without complication, without long-term current use of insulin (HCC) (Chronic)      Lab Results   Component Value Date    HGBA1C 6 1 (H) 01/17/2022         Dyslipidemia (Chronic)    Erectile dysfunction    Fatty liver    Lymphoma (HCC) (Chronic)    Overview Signed 2/8/2018 11:17 AM by Joann Estevez DO     Description: small cell lymphocytic lymphoma         Malignant tumor of cecum ([facility] [address] )    Multiple myeloma not having achieved remission ([facility] [address] )    Primary localized osteoarthritis of right knee    Hx of CABG    Essential hypertension (Chronic)    S/P total knee arthroplasty, left    Overview Signed 1/28/2019  4:19 PM by DUNG Sawyer     -  S/P L TKA today  -  Orthopedic surgery attending           Nocturnal hypoxia    Chronic systolic congestive heart failure ([facility] [address] )    Wt Readings from Last 3 Encounters:   04/18/22 73 3 kg (161 lb 9 6 oz)   04/07/22 73 5 kg (162 lb)   03/31/22 73 5 kg (162 lb)                 History of aortic valve replacement with bioprosthetic valve (Chronic)    Leukocytosis    Paroxysmal atrial fibrillation (HCC) (Chronic)    Dysphagia Personal history of colon cancer    Roberts's esophagus without dysplasia    Schatzki's ring of distal esophagus    Gastroesophageal reflux disease with esophagitis (Chronic)    Microcytic anemia    Mouth ulcers              Past Medical History:   Diagnosis Date    Angina pectoris ([place] [address] )     Arthritis     Atrial fibrillation ([place] [address] )     Cancer (HCC)     Cataract     Colitis     Colon cancer (HCC)     Colon polyp     Diabetes mellitus ([place] [address] )     Fatty liver     GERD (gastroesophageal reflux disease)     History of chemotherapy     History of radiation therapy     History of shingles 2018    History of transfusion     Hyperlipidemia     Hypertension     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[place] [address] )     Pneumonia     Skin cancer      Social History     Socioeconomic History    Marital status: /Civil Union     Spouse name: Not on file    Number of children: Not on file    Years of education: Not on file    Highest education level: Not on file   Occupational History    Occupation: WORKING FULLTIME    Tobacco Use    Smoking status: Former Smoker     Packs/day: 1 00     Years: 40 00     Pack years: 40 00     Types: Cigarettes     Start date:      Quit date:      Years since quittin 3    Smokeless tobacco: Never Used   Vaping Use    Vaping Use: Never used   Substance and Sexual Activity    Alcohol use: Not Currently     Comment: very rare "beer here and there"    Drug use: No    Sexual activity: Not on file   Other Topics Concern    Not on file   Social History Narrative    Not on file     Social Determinants of Health     Financial Resource Strain: Not on file   Food Insecurity: Not on file   Transportation Needs: Not on file   Physical Activity: Not on file   Stress: Not on file   Social Connections: Not on file   Intimate Partner Violence: Not on file   Housing Stability: Not on file      Family History   Problem Relation Age of Onset    Heart block Family     Coronary artery disease Mother  Thrombosis Mother     Hypertension Mother    Emaline Folds Arthritis Mother     Hyperlipidemia Mother     Diabetes Father     Hypertension Father     Arthritis Father     Sudden death Father         cardiac    Colon cancer Paternal Grandfather     Breast cancer Sister     Heart disease Brother         Coronary stents     Past Surgical History:   Procedure Laterality Date    AORTIC VALVE REPLACEMENT      CATARACT EXTRACTION Bilateral     COLECTOMY      COLONOSCOPY  11/2019    Prior right hemicolectomy    CORONARY ARTERY BYPASS GRAFT      DENTAL SURGERY      KNEE SURGERY      LYMPHADENECTOMY      OTHER SURGICAL HISTORY      MAZE PROCEDURE    RI TOTAL KNEE ARTHROPLASTY Left 1/28/2019    Procedure: ARTHROPLASTY LEFT KNEE TOTAL;  Surgeon: Wilbert Rowe MD;  Location:  MAIN OR;  Service: Orthopedics    SKIN BIOPSY      UPPER GASTROINTESTINAL ENDOSCOPY  11/2019    Schatzki ring    US GUIDED LYMPH NODE BIOPSY RIGHT  12/14/2021       Current Outpatient Medications:     aspirin 81 MG tablet, Take 1 tablet (81 mg total) by mouth daily, Disp: , Rfl:     atorvastatin (LIPITOR) 40 mg tablet, TAKE 1 TABLET BY MOUTH EVERY DAY, Disp: 90 tablet, Rfl: 3    cholecalciferol (VITAMIN D3) 1,000 units tablet, Take 2 tablets (2,000 Units total) by mouth daily, Disp: 60 tablet, Rfl: 0    ferrous sulfate 324 MG TBEC, Take 1 tablet (324 mg total) by mouth daily with breakfast, Disp: 30 tablet, Rfl: 2    furosemide (LASIX) 20 mg tablet, TAKE 1 TABLET BY MOUTH TWICE A DAY, Disp: 180 tablet, Rfl: 1    metFORMIN (GLUCOPHAGE-XR) 500 mg 24 hr tablet, TAKE 1 TABLET BY MOUTH EVERY DAY WITH DINNER, Disp: 90 tablet, Rfl: 2    metoprolol succinate (TOPROL-XL) 100 mg 24 hr tablet, TAKE 1 TABLET BY MOUTH EVERY DAY, Disp: 90 tablet, Rfl: 1    multivitamin-minerals (CENTRUM ADULTS) tablet, Take 1 tablet by mouth daily, Disp:  , Rfl: 0    nitroglycerin (NITROSTAT) 0 3 mg SL tablet, Nitrostat 0 3 mg sublingual tablet  Place 1 tablet as needed by sublingual route as needed for 90 days  , Disp: , Rfl:     omeprazole (PriLOSEC) 40 MG capsule, Take 1 capsule (40 mg total) by mouth daily, Disp: 90 capsule, Rfl: 2  Allergies   Allergen Reactions    Ceftin [Cefuroxime] Itching     However, has tolerated Cefazolin and Pip-Tazo since, which have different side chains  Be cautious with / avoid 2nd, 3rd, or 4th Gen Cephs that have similar side chains to Cefuroxime   Lantus [Insulin Glargine] Itching       Labs:     Chemistry        Component Value Date/Time     05/19/2017 0720    K 3 6 04/14/2022 1008    CL 99 04/14/2022 1008    CO2 26 04/14/2022 1008    BUN 11 04/14/2022 1008    CREATININE 1 07 04/14/2022 1008    CREATININE 1 11 05/20/2021 0545    CREATININE 0 93 05/19/2017 0720        Component Value Date/Time    CALCIUM 8 4 05/20/2021 0545    CALCIUM 9 3 05/19/2017 0720    ALKPHOS 139 (H) 05/13/2021 0503    ALKPHOS 97 05/19/2017 0720    AST 11 01/17/2022 0646    ALT 9 01/17/2022 0646    BILITOT 0 9 05/19/2017 0720            Lab Results   Component Value Date    CHOL 131 05/19/2017    CHOL 111 02/05/2016     Lab Results   Component Value Date    HDL 27 (L) 01/17/2022    HDL 24 (L) 12/30/2020    HDL 31 (L) 09/12/2019     Lab Results   Component Value Date    LDLCALC 54 01/17/2022    LDLCALC 37 12/30/2020    LDLCALC 44 09/12/2019     Lab Results   Component Value Date    TRIG 117 01/17/2022    TRIG 93 04/06/2021    TRIG 88 12/30/2020     Lab Results   Component Value Date    CHOLHDL 3 8 01/17/2022    CHOLHDL 3 3 12/30/2020    CHOLHDL 4 0 05/18/2018       Imaging: No results found  EKG: Normal Sinus Rhythm Left Ventricular hypertrophy  Review of Systems   Constitutional: Negative  HENT: Negative  Eyes: Negative  Cardiovascular: Negative  Respiratory: Negative  Endocrine: Negative  Hematologic/Lymphatic: Negative  Skin: Negative  Musculoskeletal: Negative  Gastrointestinal: Negative  Genitourinary: Negative  Neurological: Negative  Psychiatric/Behavioral: Negative  Allergic/Immunologic: Negative  Vitals:    04/18/22 0834   BP: 110/58   Pulse: 74           Physical Exam  Vitals reviewed  Constitutional:       Appearance: Normal appearance  HENT:      Head: Normocephalic  Nose: Nose normal       Mouth/Throat:      Mouth: Mucous membranes are moist       Pharynx: Oropharynx is clear  Eyes:      General: No scleral icterus  Conjunctiva/sclera: Conjunctivae normal    Cardiovascular:      Rate and Rhythm: Normal rate and regular rhythm  Heart sounds: No murmur heard  No friction rub  No gallop  Pulmonary:      Effort: Pulmonary effort is normal  No respiratory distress  Breath sounds: Normal breath sounds  No wheezing or rales  Abdominal:      General: Abdomen is flat  Bowel sounds are normal  There is no distension  Palpations: Abdomen is soft  Tenderness: There is no abdominal tenderness  There is no guarding  Musculoskeletal:      Cervical back: Normal range of motion and neck supple  Right lower leg: No edema  Left lower leg: No edema  Skin:     General: Skin is warm and dry  Neurological:      General: No focal deficit present  Mental Status: He is alert and oriented to person, place, and time  Psychiatric:         Mood and Affect: Mood normal          Behavior: Behavior normal          Discussion/Summary:    Coronary Artery Disease s/p CARLSON To LAD: He has no angina  Continue with medical therapy  Lipids stable  Ef 40% last year  Update echocardiogram  Last LDL 54      preop cardiac clearance: He is stable to have laryngoscopy with biopsy with Dr Marium Ta     HTN: BP up today but at home has been ok   No changes       S/p BIO AVR: normal function on his echocardiogram       Hx of PAF in the past and s/p MAZE and TALIA ligation           The patient was counseled regarding diagnostic results, instructions for management, risk factor reductions, impressions  total time of encounter was 25 minutes and 15 minutes was spent counseling

## 2022-04-19 ENCOUNTER — HOSPITAL ENCOUNTER (INPATIENT)
Facility: HOSPITAL | Age: 77
LOS: 3 days | Discharge: HOME | DRG: 871 | End: 2022-04-22
Attending: EMERGENCY MEDICINE | Admitting: STUDENT IN AN ORGANIZED HEALTH CARE EDUCATION/TRAINING PROGRAM
Payer: COMMERCIAL

## 2022-04-19 ENCOUNTER — APPOINTMENT (EMERGENCY)
Dept: CT IMAGING | Facility: HOSPITAL | Age: 77
DRG: 871 | End: 2022-04-19
Payer: COMMERCIAL

## 2022-04-19 ENCOUNTER — APPOINTMENT (EMERGENCY)
Dept: RADIOLOGY | Facility: HOSPITAL | Age: 77
DRG: 871 | End: 2022-04-19
Payer: COMMERCIAL

## 2022-04-19 DIAGNOSIS — J18.9 PNEUMONIA: ICD-10-CM

## 2022-04-19 DIAGNOSIS — R09.02 HYPOXIA: ICD-10-CM

## 2022-04-19 DIAGNOSIS — A41.9 SEPSIS (HCC): Primary | ICD-10-CM

## 2022-04-19 DIAGNOSIS — J18.9 HCAP (HEALTHCARE-ASSOCIATED PNEUMONIA): ICD-10-CM

## 2022-04-19 DIAGNOSIS — I50.9 CHF (CONGESTIVE HEART FAILURE) (HCC): ICD-10-CM

## 2022-04-19 DIAGNOSIS — K14.0 GLOSSITIS: ICD-10-CM

## 2022-04-19 PROBLEM — K14.9: Status: ACTIVE | Noted: 2022-04-19

## 2022-04-19 PROBLEM — R07.9 CHEST PAIN: Status: ACTIVE | Noted: 2022-04-19

## 2022-04-19 LAB
2HR DELTA HS TROPONIN: 28 NG/L
4HR DELTA HS TROPONIN: 41 NG/L
ALBUMIN SERPL BCP-MCNC: 3.6 G/DL (ref 3.5–5)
ALP SERPL-CCNC: 153 U/L (ref 46–116)
ALT SERPL W P-5'-P-CCNC: 13 U/L (ref 12–78)
ANION GAP SERPL CALCULATED.3IONS-SCNC: 11 MMOL/L (ref 4–13)
APTT PPP: 30 SECONDS (ref 23–37)
AST SERPL W P-5'-P-CCNC: 11 U/L (ref 5–45)
BASOPHILS # BLD AUTO: 0.12 THOUSANDS/ΜL (ref 0–0.1)
BASOPHILS NFR BLD AUTO: 1 % (ref 0–1)
BILIRUB SERPL-MCNC: 0.7 MG/DL (ref 0.2–1)
BUN SERPL-MCNC: 10 MG/DL (ref 5–25)
CALCIUM SERPL-MCNC: 8.9 MG/DL (ref 8.3–10.1)
CARDIAC TROPONIN I PNL SERPL HS: 12 NG/L
CARDIAC TROPONIN I PNL SERPL HS: 40 NG/L
CARDIAC TROPONIN I PNL SERPL HS: 53 NG/L
CHLORIDE SERPL-SCNC: 98 MMOL/L (ref 100–108)
CO2 SERPL-SCNC: 28 MMOL/L (ref 21–32)
CREAT SERPL-MCNC: 1.15 MG/DL (ref 0.6–1.3)
D DIMER PPP FEU-MCNC: 1.86 UG/ML FEU
EOSINOPHIL # BLD AUTO: 0.31 THOUSAND/ΜL (ref 0–0.61)
EOSINOPHIL NFR BLD AUTO: 1 % (ref 0–6)
ERYTHROCYTE [DISTWIDTH] IN BLOOD BY AUTOMATED COUNT: 15.3 % (ref 11.6–15.1)
EST. AVERAGE GLUCOSE BLD GHB EST-MCNC: 120 MG/DL
FLUAV RNA RESP QL NAA+PROBE: NEGATIVE
FLUBV RNA RESP QL NAA+PROBE: NEGATIVE
GFR SERPL CREATININE-BSD FRML MDRD: 61 ML/MIN/1.73SQ M
GLUCOSE SERPL-MCNC: 118 MG/DL (ref 65–140)
GLUCOSE SERPL-MCNC: 130 MG/DL (ref 65–140)
GLUCOSE SERPL-MCNC: 170 MG/DL (ref 65–140)
GLUCOSE SERPL-MCNC: 171 MG/DL (ref 65–140)
GLUCOSE SERPL-MCNC: 91 MG/DL (ref 65–140)
HBA1C MFR BLD: 5.8 %
HCT VFR BLD AUTO: 40.5 % (ref 36.5–49.3)
HGB BLD-MCNC: 12.6 G/DL (ref 12–17)
IMM GRANULOCYTES # BLD AUTO: 0.14 THOUSAND/UL (ref 0–0.2)
IMM GRANULOCYTES NFR BLD AUTO: 1 % (ref 0–2)
INR PPP: 1.24 (ref 0.84–1.19)
L PNEUMO1 AG UR QL IA.RAPID: NEGATIVE
LACTATE SERPL-SCNC: 1.2 MMOL/L (ref 0.5–2)
LACTATE SERPL-SCNC: 4 MMOL/L (ref 0.5–2)
LYMPHOCYTES # BLD AUTO: 4.93 THOUSANDS/ΜL (ref 0.6–4.47)
LYMPHOCYTES NFR BLD AUTO: 22 % (ref 14–44)
MAGNESIUM SERPL-MCNC: 1.8 MG/DL (ref 1.6–2.6)
MCH RBC QN AUTO: 26.9 PG (ref 26.8–34.3)
MCHC RBC AUTO-ENTMCNC: 31.1 G/DL (ref 31.4–37.4)
MCV RBC AUTO: 86 FL (ref 82–98)
MONOCYTES # BLD AUTO: 1.23 THOUSAND/ΜL (ref 0.17–1.22)
MONOCYTES NFR BLD AUTO: 6 % (ref 4–12)
NEUTROPHILS # BLD AUTO: 15.58 THOUSANDS/ΜL (ref 1.85–7.62)
NEUTS SEG NFR BLD AUTO: 69 % (ref 43–75)
NRBC BLD AUTO-RTO: 0 /100 WBCS
NT-PROBNP SERPL-MCNC: 2575 PG/ML
PLATELET # BLD AUTO: 297 THOUSANDS/UL (ref 149–390)
PLATELET # BLD AUTO: 396 THOUSANDS/UL (ref 149–390)
PMV BLD AUTO: 9.2 FL (ref 8.9–12.7)
PMV BLD AUTO: 9.4 FL (ref 8.9–12.7)
POTASSIUM SERPL-SCNC: 3 MMOL/L (ref 3.5–5.3)
PROCALCITONIN SERPL-MCNC: 0.12 NG/ML
PROT SERPL-MCNC: 7.4 G/DL (ref 6.4–8.2)
PROTHROMBIN TIME: 15.4 SECONDS (ref 11.6–14.5)
RBC # BLD AUTO: 4.69 MILLION/UL (ref 3.88–5.62)
RSV RNA RESP QL NAA+PROBE: NEGATIVE
S PNEUM AG UR QL: POSITIVE
SARS-COV-2 RNA RESP QL NAA+PROBE: NEGATIVE
SODIUM SERPL-SCNC: 137 MMOL/L (ref 136–145)
WBC # BLD AUTO: 22.31 THOUSAND/UL (ref 4.31–10.16)

## 2022-04-19 PROCEDURE — 84484 ASSAY OF TROPONIN QUANT: CPT | Performed by: EMERGENCY MEDICINE

## 2022-04-19 PROCEDURE — 3044F HG A1C LEVEL LT 7.0%: CPT | Performed by: INTERNAL MEDICINE

## 2022-04-19 PROCEDURE — 71275 CT ANGIOGRAPHY CHEST: CPT

## 2022-04-19 PROCEDURE — 0241U HB NFCT DS VIR RESP RNA 4 TRGT: CPT | Performed by: EMERGENCY MEDICINE

## 2022-04-19 PROCEDURE — 85025 COMPLETE CBC W/AUTO DIFF WBC: CPT | Performed by: EMERGENCY MEDICINE

## 2022-04-19 PROCEDURE — 99223 1ST HOSP IP/OBS HIGH 75: CPT | Performed by: INTERNAL MEDICINE

## 2022-04-19 PROCEDURE — 85049 AUTOMATED PLATELET COUNT: CPT | Performed by: INTERNAL MEDICINE

## 2022-04-19 PROCEDURE — 82948 REAGENT STRIP/BLOOD GLUCOSE: CPT

## 2022-04-19 PROCEDURE — 96367 TX/PROPH/DG ADDL SEQ IV INF: CPT

## 2022-04-19 PROCEDURE — 87449 NOS EACH ORGANISM AG IA: CPT | Performed by: EMERGENCY MEDICINE

## 2022-04-19 PROCEDURE — 83036 HEMOGLOBIN GLYCOSYLATED A1C: CPT | Performed by: INTERNAL MEDICINE

## 2022-04-19 PROCEDURE — 80053 COMPREHEN METABOLIC PANEL: CPT | Performed by: EMERGENCY MEDICINE

## 2022-04-19 PROCEDURE — 99292 CRITICAL CARE ADDL 30 MIN: CPT | Performed by: EMERGENCY MEDICINE

## 2022-04-19 PROCEDURE — G1004 CDSM NDSC: HCPCS

## 2022-04-19 PROCEDURE — 85379 FIBRIN DEGRADATION QUANT: CPT | Performed by: EMERGENCY MEDICINE

## 2022-04-19 PROCEDURE — 93005 ELECTROCARDIOGRAM TRACING: CPT

## 2022-04-19 PROCEDURE — 87040 BLOOD CULTURE FOR BACTERIA: CPT | Performed by: EMERGENCY MEDICINE

## 2022-04-19 PROCEDURE — 85610 PROTHROMBIN TIME: CPT | Performed by: EMERGENCY MEDICINE

## 2022-04-19 PROCEDURE — 83605 ASSAY OF LACTIC ACID: CPT | Performed by: INTERNAL MEDICINE

## 2022-04-19 PROCEDURE — 99291 CRITICAL CARE FIRST HOUR: CPT | Performed by: EMERGENCY MEDICINE

## 2022-04-19 PROCEDURE — 36415 COLL VENOUS BLD VENIPUNCTURE: CPT | Performed by: EMERGENCY MEDICINE

## 2022-04-19 PROCEDURE — 83605 ASSAY OF LACTIC ACID: CPT | Performed by: EMERGENCY MEDICINE

## 2022-04-19 PROCEDURE — 96365 THER/PROPH/DIAG IV INF INIT: CPT

## 2022-04-19 PROCEDURE — 71045 X-RAY EXAM CHEST 1 VIEW: CPT

## 2022-04-19 PROCEDURE — 99285 EMERGENCY DEPT VISIT HI MDM: CPT

## 2022-04-19 PROCEDURE — 96360 HYDRATION IV INFUSION INIT: CPT

## 2022-04-19 PROCEDURE — 87205 SMEAR GRAM STAIN: CPT | Performed by: EMERGENCY MEDICINE

## 2022-04-19 PROCEDURE — 83735 ASSAY OF MAGNESIUM: CPT | Performed by: EMERGENCY MEDICINE

## 2022-04-19 PROCEDURE — 83880 ASSAY OF NATRIURETIC PEPTIDE: CPT | Performed by: EMERGENCY MEDICINE

## 2022-04-19 PROCEDURE — 85730 THROMBOPLASTIN TIME PARTIAL: CPT | Performed by: EMERGENCY MEDICINE

## 2022-04-19 PROCEDURE — 87154 CUL TYP ID BLD PTHGN 6+ TRGT: CPT | Performed by: EMERGENCY MEDICINE

## 2022-04-19 PROCEDURE — 87070 CULTURE OTHR SPECIMN AEROBIC: CPT | Performed by: EMERGENCY MEDICINE

## 2022-04-19 PROCEDURE — 84484 ASSAY OF TROPONIN QUANT: CPT | Performed by: INTERNAL MEDICINE

## 2022-04-19 PROCEDURE — 94640 AIRWAY INHALATION TREATMENT: CPT

## 2022-04-19 PROCEDURE — 84145 PROCALCITONIN (PCT): CPT | Performed by: EMERGENCY MEDICINE

## 2022-04-19 PROCEDURE — 94760 N-INVAS EAR/PLS OXIMETRY 1: CPT

## 2022-04-19 RX ORDER — PANTOPRAZOLE SODIUM 40 MG/1
40 TABLET, DELAYED RELEASE ORAL
Status: DISCONTINUED | OUTPATIENT
Start: 2022-04-19 | End: 2022-04-22 | Stop reason: HOSPADM

## 2022-04-19 RX ORDER — VANCOMYCIN HYDROCHLORIDE 1 G/200ML
12.5 INJECTION, SOLUTION INTRAVENOUS EVERY 12 HOURS
Status: DISCONTINUED | OUTPATIENT
Start: 2022-04-19 | End: 2022-04-19

## 2022-04-19 RX ORDER — GUAIFENESIN 600 MG
600 TABLET, EXTENDED RELEASE 12 HR ORAL 2 TIMES DAILY
Status: DISCONTINUED | OUTPATIENT
Start: 2022-04-19 | End: 2022-04-22 | Stop reason: HOSPADM

## 2022-04-19 RX ORDER — FUROSEMIDE 20 MG/1
20 TABLET ORAL 2 TIMES DAILY
Status: DISCONTINUED | OUTPATIENT
Start: 2022-04-19 | End: 2022-04-22 | Stop reason: HOSPADM

## 2022-04-19 RX ORDER — ACETAMINOPHEN 325 MG/1
650 TABLET ORAL EVERY 6 HOURS PRN
Status: DISCONTINUED | OUTPATIENT
Start: 2022-04-19 | End: 2022-04-22 | Stop reason: HOSPADM

## 2022-04-19 RX ORDER — NITROGLYCERIN 0.4 MG/1
0.4 TABLET SUBLINGUAL ONCE
Status: COMPLETED | OUTPATIENT
Start: 2022-04-19 | End: 2022-04-19

## 2022-04-19 RX ORDER — ATORVASTATIN CALCIUM 40 MG/1
40 TABLET, FILM COATED ORAL
Status: DISCONTINUED | OUTPATIENT
Start: 2022-04-19 | End: 2022-04-22 | Stop reason: HOSPADM

## 2022-04-19 RX ORDER — MELATONIN
2000 DAILY
Status: DISCONTINUED | OUTPATIENT
Start: 2022-04-19 | End: 2022-04-22 | Stop reason: HOSPADM

## 2022-04-19 RX ORDER — ASPIRIN 81 MG/1
81 TABLET, CHEWABLE ORAL DAILY
Status: DISCONTINUED | OUTPATIENT
Start: 2022-04-19 | End: 2022-04-22 | Stop reason: HOSPADM

## 2022-04-19 RX ORDER — LEVALBUTEROL INHALATION SOLUTION 0.63 MG/3ML
0.63 SOLUTION RESPIRATORY (INHALATION)
Status: DISCONTINUED | OUTPATIENT
Start: 2022-04-19 | End: 2022-04-22 | Stop reason: HOSPADM

## 2022-04-19 RX ORDER — POTASSIUM CHLORIDE 20MEQ/15ML
20 LIQUID (ML) ORAL ONCE
Status: COMPLETED | OUTPATIENT
Start: 2022-04-19 | End: 2022-04-19

## 2022-04-19 RX ORDER — METOPROLOL SUCCINATE 50 MG/1
100 TABLET, EXTENDED RELEASE ORAL DAILY
Status: DISCONTINUED | OUTPATIENT
Start: 2022-04-19 | End: 2022-04-22 | Stop reason: HOSPADM

## 2022-04-19 RX ORDER — ONDANSETRON 2 MG/ML
4 INJECTION INTRAMUSCULAR; INTRAVENOUS EVERY 6 HOURS PRN
Status: DISCONTINUED | OUTPATIENT
Start: 2022-04-19 | End: 2022-04-22 | Stop reason: HOSPADM

## 2022-04-19 RX ORDER — HEPARIN SODIUM 5000 [USP'U]/ML
5000 INJECTION, SOLUTION INTRAVENOUS; SUBCUTANEOUS EVERY 8 HOURS SCHEDULED
Status: DISCONTINUED | OUTPATIENT
Start: 2022-04-19 | End: 2022-04-22 | Stop reason: HOSPADM

## 2022-04-19 RX ADMIN — FUROSEMIDE 20 MG: 20 TABLET ORAL at 08:54

## 2022-04-19 RX ADMIN — POTASSIUM CHLORIDE 20 MEQ: 20 SOLUTION ORAL at 02:09

## 2022-04-19 RX ADMIN — Medication 1 TABLET: at 08:54

## 2022-04-19 RX ADMIN — AZITHROMYCIN MONOHYDRATE 500 MG: 500 INJECTION, POWDER, LYOPHILIZED, FOR SOLUTION INTRAVENOUS at 02:45

## 2022-04-19 RX ADMIN — SODIUM CHLORIDE 250 ML: 0.9 INJECTION, SOLUTION INTRAVENOUS at 02:11

## 2022-04-19 RX ADMIN — ASPIRIN 81 MG CHEWABLE TABLET 81 MG: 81 TABLET CHEWABLE at 08:54

## 2022-04-19 RX ADMIN — IOHEXOL 90 ML: 350 INJECTION, SOLUTION INTRAVENOUS at 02:37

## 2022-04-19 RX ADMIN — LEVALBUTEROL HYDROCHLORIDE 0.63 MG: 0.63 SOLUTION RESPIRATORY (INHALATION) at 22:24

## 2022-04-19 RX ADMIN — PIPERACILLIN AND TAZOBACTAM 4.5 G: 4; .5 INJECTION, POWDER, FOR SOLUTION INTRAVENOUS at 01:58

## 2022-04-19 RX ADMIN — HEPARIN SODIUM 5000 UNITS: 5000 INJECTION INTRAVENOUS; SUBCUTANEOUS at 21:58

## 2022-04-19 RX ADMIN — ATORVASTATIN CALCIUM 40 MG: 40 TABLET, FILM COATED ORAL at 17:08

## 2022-04-19 RX ADMIN — ACETAMINOPHEN 650 MG: 325 TABLET ORAL at 15:30

## 2022-04-19 RX ADMIN — HEPARIN SODIUM 5000 UNITS: 5000 INJECTION INTRAVENOUS; SUBCUTANEOUS at 05:52

## 2022-04-19 RX ADMIN — HEPARIN SODIUM 5000 UNITS: 5000 INJECTION INTRAVENOUS; SUBCUTANEOUS at 14:30

## 2022-04-19 RX ADMIN — PIPERACILLIN AND TAZOBACTAM 3.38 G: 3; .375 INJECTION, POWDER, LYOPHILIZED, FOR SOLUTION INTRAVENOUS at 14:30

## 2022-04-19 RX ADMIN — GUAIFENESIN 600 MG: 600 TABLET ORAL at 08:54

## 2022-04-19 RX ADMIN — GUAIFENESIN 600 MG: 600 TABLET ORAL at 17:08

## 2022-04-19 RX ADMIN — Medication 2000 UNITS: at 09:11

## 2022-04-19 RX ADMIN — PIPERACILLIN AND TAZOBACTAM 3.38 G: 3; .375 INJECTION, POWDER, LYOPHILIZED, FOR SOLUTION INTRAVENOUS at 08:56

## 2022-04-19 RX ADMIN — METOPROLOL SUCCINATE 100 MG: 50 TABLET, EXTENDED RELEASE ORAL at 08:54

## 2022-04-19 RX ADMIN — INSULIN LISPRO 1 UNITS: 100 INJECTION, SOLUTION INTRAVENOUS; SUBCUTANEOUS at 13:17

## 2022-04-19 RX ADMIN — PANTOPRAZOLE SODIUM 40 MG: 40 TABLET, DELAYED RELEASE ORAL at 08:54

## 2022-04-19 RX ADMIN — AMPICILLIN SODIUM AND SULBACTAM SODIUM 3 G: 2; 1 INJECTION, POWDER, FOR SOLUTION INTRAMUSCULAR; INTRAVENOUS at 21:58

## 2022-04-19 RX ADMIN — FUROSEMIDE 20 MG: 20 TABLET ORAL at 17:08

## 2022-04-19 RX ADMIN — VANCOMYCIN HYDROCHLORIDE 1500 MG: 1 INJECTION, POWDER, LYOPHILIZED, FOR SOLUTION INTRAVENOUS at 04:01

## 2022-04-19 RX ADMIN — NITROGLYCERIN 0.4 MG: 0.4 TABLET SUBLINGUAL at 03:18

## 2022-04-19 RX ADMIN — IPRATROPIUM BROMIDE 0.5 MG: 0.5 SOLUTION RESPIRATORY (INHALATION) at 22:24

## 2022-04-19 NOTE — PLAN OF CARE
Problem: PAIN - ADULT  Goal: Verbalizes/displays adequate comfort level or baseline comfort level  Description: Interventions:  - Encourage patient to monitor pain and request assistance  - Assess pain using appropriate pain scale  - Administer analgesics based on type and severity of pain and evaluate response  - Implement non-pharmacological measures as appropriate and evaluate response  - Consider cultural and social influences on pain and pain management  - Notify physician/advanced practitioner if interventions unsuccessful or patient reports new pain  Outcome: Progressing     Problem: INFECTION - ADULT  Goal: Absence or prevention of progression during hospitalization  Description: INTERVENTIONS:  - Assess and monitor for signs and symptoms of infection  - Monitor lab/diagnostic results  - Monitor all insertion sites, i e  indwelling lines, tubes, and drains  - Monitor endotracheal if appropriate and nasal secretions for changes in amount and color  - [place] appropriate cooling/warming therapies per order  - Administer medications as ordered  - Instruct and encourage patient and family to use good hand hygiene technique  - Identify and instruct in appropriate isolation precautions for identified infection/condition  Outcome: Progressing  Goal: Absence of fever/infection during neutropenic period  Description: INTERVENTIONS:  - Monitor WBC    Outcome: Progressing     Problem: SAFETY ADULT  Goal: Patient will remain free of falls  Description: INTERVENTIONS:  - Educate patient/family on patient safety including physical limitations  - Instruct patient to call for assistance with activity   - Consult OT/PT to assist with strengthening/mobility   - Keep Call bell within reach  - Keep bed low and locked with side rails adjusted as appropriate  - Keep care items and personal belongings within reach  - Initiate and maintain comfort rounds  - Make Fall Risk Sign visible to staff  - Offer Toileting every 2 Hours, in advance of need  - Initiate/Maintain alarm  - Obtain necessary fall risk management equipment  - Apply yellow socks and bracelet for high fall risk patients  - Consider moving patient to room near nurses station  Outcome: Progressing  Goal: Maintain or return to baseline ADL function  Description: INTERVENTIONS:  -  Assess patient's ability to carry out ADLs; assess patient's baseline for ADL function and identify physical deficits which impact ability to perform ADLs (bathing, care of mouth/teeth, toileting, grooming, dressing, etc )  - Assess/evaluate cause of self-care deficits   - Assess range of motion  - Assess patient's mobility; develop plan if impaired  - Assess patient's need for assistive devices and provide as appropriate  - Encourage maximum independence but intervene and supervise when necessary  - Involve family in performance of ADLs  - Assess for home care needs following discharge   - Consider OT consult to assist with ADL evaluation and planning for discharge  - Provide patient education as appropriate  Outcome: Progressing  Goal: Maintains/Returns to pre admission functional level  Description: INTERVENTIONS:  - Perform BMAT or MOVE assessment daily    - Set and communicate daily mobility goal to care team and patient/family/caregiver  - Collaborate with rehabilitation services on mobility goals if consulted  - Perform Range of Motion 4 times a day  - Reposition patient every 2 hours    - Dangle patient 4 times a day  - Stand patient 4 times a day  - Ambulate patient 4 times a day  - Out of bed to chair 4 times a day   - Out of bed for meals 3 times a day  - Out of bed for toileting  - Record patient progress and toleration of activity level   Outcome: Progressing     Problem: DISCHARGE PLANNING  Goal: Discharge to home or other facility with appropriate resources  Description: INTERVENTIONS:  - Identify barriers to discharge w/patient and caregiver  - Arrange for needed discharge resources and transportation as appropriate  - Identify discharge learning needs (meds, wound care, etc )  - Arrange for interpretive services to assist at discharge as needed  - Refer to Case Management Department for coordinating discharge planning if the patient needs post-hospital services based on physician/advanced practitioner order or complex needs related to functional status, cognitive ability, or social support system  Outcome: Progressing     Problem: Knowledge Deficit  Goal: Patient/family/caregiver demonstrates understanding of disease process, treatment plan, medications, and discharge instructions  Description: Complete learning assessment and assess knowledge base    Interventions:  - Provide teaching at level of understanding  - Provide teaching via preferred learning methods  Outcome: Progressing     Problem: RESPIRATORY - ADULT  Goal: Achieves optimal ventilation and oxygenation  Description: INTERVENTIONS:  - Assess for changes in respiratory status  - Assess for changes in mentation and behavior  - Position to facilitate oxygenation and minimize respiratory effort  - Oxygen administered by appropriate delivery if ordered  - Initiate smoking cessation education as indicated  - Encourage broncho-pulmonary hygiene including cough, deep breathe, Incentive Spirometry  - Assess the need for suctioning and aspirate as needed  - Assess and instruct to report SOB or any respiratory difficulty  - Respiratory Therapy support as indicated  Outcome: Progressing

## 2022-04-19 NOTE — CONSULTS
Consultation - Pulmonary Medicine   Mulugeta Gandhi 77 y o  male MRN: [id number]  Unit/Bed#: ED 02 Encounter: [id number]      Assessment & Plan:   Acute hypoxic respiratory failure  Sepsis, POA  Streptococcal pneumonia  Abnormal CT chest with increasing size of RLL nodule and mediastinal adenopathy  History of lymphoma, multiple myeloma, colon cancer, and melanoma  History of severe COVID-19 infection 05/2021    · Initially requiring 5 L by nasal cannula  Does not require any oxygen at baseline  Currently in the high 90s on 1 L  Titrate as able maintain pulse ox greater than 89%  · Consider narrowing antibiotic coverage to ceftriaxone +/- azithromycin as strep pneumoniae urinary antigen is positive  · Follow-up on sputum culture  · Reschedule lymph node biopsy as outpatient  Follows closely with Oncology  Outpatient pulmonary follow-up when discharged     History of Present Illness   Physician Requesting Consult: Duarte Miranda MD  Reason for Consult / Principal Problem: Pneumonia, hypoxia  Hx and PE limited by: none  HPI: Mulugeta Gandhi is a 77y o  year old male who presents with worsening shortness of breath and cough over just a few days  He was at his baseline state of health last week  Last evening, he started to have severe dyspnea which prompted his arrival to the ED  He initially required 5 L to maintain adequate oxygen saturations  He had a CT of the chest done which showed evidence of pneumonia along with increasing size of his lymphadenopathy and pulmonary nodule  On he was scheduled for he lymph node biopsy tomorrow which of course will have to be rescheduled  He is already feeling better since his admission  Currently maintaining saturations in the high 90s on 1 L by nasal cannula  He has not required supplemental oxygen at home for many months  Prior to this, he was not having any issues with his breathing      Inpatient consult to Pulmonology  Consult performed by: Michell Garcia Kacey Hoffman PA-C  Consult ordered by: Pilar Ang PA-C          Review of Systems   Constitutional: Positive for fatigue  Respiratory: Positive for cough and shortness of breath  All other systems reviewed and are negative        Historical Information   Past Medical History:   Diagnosis Date    Angina pectoris ([facility] [address] [facility]     Arthritis     [facility]rial fibrillation [facility]     Cancer ([facility]     Cataract     Colitis     Colon cancer ([facility]     Colon polyp     Diabetes mellitus ([facility]     Fatty liver     GERD (gastroesophageal reflux disease)     History of chemotherapy     History of radiation therapy     History of shingles 2018    History of transfusion     Hyperlipidemia     Hypertension     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[facility]     Pneumonia     Skin cancer      Past Surgical History:   Procedure Laterality Date    AORTIC VALVE REPLACEMENT      CATARACT EXTRACTION Bilateral     COLECTOMY      COLONOSCOPY  11/2019    Prior right hemicolectomy    CORONARY ARTERY BYPASS GRAFT      DENTAL SURGERY      KNEE SURGERY      LYMPHADENECTOMY      OTHER SURGICAL HISTORY      MAZE PROCEDURE    FL TOTAL KNEE ARTHROPLASTY Left 1/28/2019    Procedure: ARTHROPLASTY LEFT KNEE TOTAL;  Surgeon: Brooke Suarez MD;  Location: [facility] OR;  Service: Orthopedics    SKIN BIOPSY      UPPER GASTROINTESTINAL ENDOSCOPY  11/2019    Schatzki ring    US GUIDED LYMPH NODE BIOPSY RIGHT  12/14/2021     Social History   Social History     Substance and Sexual Activity   Alcohol Use Not Currently    Comment: very rare "beer here and there"     Social History     Substance and Sexual Activity   Drug Use No     E-Cigarette/Vaping    E-Cigarette Use Never User      E-Cigarette/Vaping Substances    Nicotine No     THC No     CBD No     Flavoring No     Other No     Unknown No      Social History     Tobacco Use   Smoking Status Former Smoker    Packs/day: 1 00    Years: 40 00    Pack years: 40 00    Types: Cigarettes    Start date: [employer] Quit date:     Years since quittin 3   Smokeless Tobacco Never Used       Family History:   Family History   Problem Relation Age of Onset    Heart block Family     Coronary artery disease Mother     Thrombosis Mother     Hypertension Mother     Arthritis Mother     Hyperlipidemia Mother     Diabetes Father     Hypertension Father     Arthritis Father     Sudden death Father         cardiac    Colon cancer Paternal Grandfather     Breast cancer Sister     Heart disease Brother         Coronary stents       Meds/Allergies   all current active meds have been reviewed    Allergies   Allergen Reactions    Ceftin [Cefuroxime] Itching     However, has tolerated Cefazolin and Pip-Tazo since, which have different side chains  Be cautious with / avoid 2nd, 3rd, or 4th Gen Cephs that have similar side chains to Cefuroxime   Lantus [Insulin Glargine] Itching       Objective   Vitals: Blood pressure 141/63, pulse 80, temperature 99 8 °F (37 7 °C), temperature source Oral, resp  rate 20, height 5' 11" (1 803 m), weight 73 5 kg (162 lb), SpO2 97 %  ,Body mass index is 22 59 kg/m²  Intake/Output Summary (Last 24 hours) at 2022 1116  Last data filed at 2022 3068  Gross per 24 hour   Intake 600 ml   Output 300 ml   Net 300 ml     Invasive Devices  Report    Peripheral Intravenous Line            Peripheral IV 22 Left Forearm <1 day    Peripheral IV 22 Right Antecubital <1 day                Physical Exam  Vitals and nursing note reviewed  Constitutional:       General: He is not in acute distress  Appearance: He is well-developed  He is not diaphoretic  HENT:      Head: Normocephalic and atraumatic  Right Ear: External ear normal       Left Ear: External ear normal       Nose: Nose normal    Eyes:      General: No scleral icterus  Right eye: No discharge  Left eye: No discharge        Conjunctiva/sclera: Conjunctivae normal    Neck:      Trachea: No tracheal deviation  Cardiovascular:      Rate and Rhythm: Normal rate and regular rhythm  Heart sounds: Normal heart sounds  No murmur heard  No friction rub  No gallop  Pulmonary:      Effort: Pulmonary effort is normal  No respiratory distress  Breath sounds: Rhonchi (R) present  No wheezing  Abdominal:      General: There is no distension  Tenderness: There is no guarding  Musculoskeletal:         General: No tenderness or deformity  Normal range of motion  Cervical back: Normal range of motion and neck supple  Skin:     General: Skin is warm and dry  Neurological:      Mental Status: He is alert and oriented to person, place, and time  Cranial Nerves: No cranial nerve deficit  Motor: No abnormal muscle tone  Psychiatric:         Behavior: Behavior normal          Thought Content: Thought content normal          Judgment: Judgment normal          Lab Results: I have personally reviewed pertinent lab results  Imaging Studies: I have personally reviewed pertinent reports  and I have personally reviewed pertinent films in PACS  EKG, Pathology, and Other Studies: I have personally reviewed pertinent reports      VTE Prophylaxis: Heparin    Code Status: Level 1 - Full Code  Advance Directive and Living Will:      Power of :    POLST:

## 2022-04-19 NOTE — ASSESSMENT & PLAN NOTE
· 89% on room air, currently on 5 L nasal cannula  · Respiratory protocol  · Receiving antibiotics for pneumonia  · Continue to monitor and wean oxygen as able

## 2022-04-19 NOTE — ASSESSMENT & PLAN NOTE
· Patient reporting chest pain over the past couple of days  · EKG without changes compared to prior  · Troponin 12, 2 hour pending  · Patient found to have pneumonia and hypoxic  · Chest pain is most likely pleuritic  · On telemetry as precaution

## 2022-04-19 NOTE — ASSESSMENT & PLAN NOTE
Wt Readings from Last 3 Encounters:   04/19/22 73 5 kg (162 lb)   04/18/22 73 3 kg (161 lb 9 6 oz)   04/07/22 73 5 kg (162 lb)     · Small left and trace right pleural effusion seen on imaging  Appears euvolemic on exam  · BNP 2575  · Monitor I/O and daily weights  · ECHO 05/2021: EF 40 %  · Continue home Lasix 20 mg b i d

## 2022-04-19 NOTE — CASE MANAGEMENT
Case Management Assessment & Discharge Planning Note    Patient name John Marin  Location ED 02/ED 02 MRN [id number]  : 1945 Date 2022       Current Admission Date: 2022  Current Admission Diagnosis:Pneumonia due to infectious organism   Patient Active Problem List    Diagnosis Date Noted    Chest pain 2022    Red tongue 2022    Mouth ulcers 2021    Microcytic anemia 2021    Sepsis ([facility] ) 04/15/2021    Gastroesophageal reflux disease with esophagitis 2021    Roberts's esophagus without dysplasia 2019    Schatzki's ring of distal esophagus 2019    Dysphagia 10/11/2019    Personal history of colon cancer 10/11/2019    Paroxysmal atrial fibrillation ([facility] )     Pneumonia due to infectious organism 2019    Leukocytosis 2019    Hypoxia 2019    Chronic systolic congestive heart failure ([facility] ) 2019    History of aortic valve replacement with bioprosthetic valve 2019    S/P total knee arthroplasty, left 2019    Essential hypertension 2018    Hx of CABG     Primary localized osteoarthritis of right knee 2018    Multiple myeloma not having achieved remission ([facility] ) 2018    Basal cell carcinoma of skin 2018    Erectile dysfunction 2017    Coronary artery disease involving native heart with angina pectoris, unspecified vessel or lesion type ([facility] ) 10/07/2014    Malignant tumor of cecum ([facility] ) 2013    Type 2 diabetes mellitus without complication, without long-term current use of insulin ([facility] ) 2012    Fatty liver 2012    Lymphoma ([facility] ) 2012    Dyslipidemia 2012      LOS (days): 0  Geometric Mean LOS (GMLOS) (days):   Days to GMLOS:     OBJECTIVE:    Risk of Unplanned Readmission Score: 19         Current admission status: Inpatient  Referral Reason: VNA    Preferred Pharmacy:   [address], [place] - [place] [address]  Phone: [phone] Fax: [phone]    Primary Care Provider: Joe Kaminski DO    Primary Insurance: [facility]  Secondary Insurance:     ASSESSMENT:  [address] - Spouse   Primary Phone: [phone] (Mobile)               Advance Directives  Does patient have a [address] [facility] [address]?: Yes  Does patient have Advance Directives?: Yes  Advance Directives: Power of  for health care  Primary Contact: Ana Tami         Readmission Root Cause  30 Day Readmission: No    Patient Information  Admitted from[de-identified] Home  Mental Status: Alert  During Assessment patient was accompanied by: Spouse  Assessment information provided by[de-identified] Patient,Spouse  Primary Caregiver: Self  Support Systems: Self,Spouse/significant other,Family members  [place] of Residence: [address] [place] [address] do you live in?: [address] [place] [address] entry access options  Select all that apply : Stairs  Number of steps to enter home  : 1  Do the steps have railings?: No  Type of Current Residence: [address] [place] home  Upon entering residence, is there a bedroom on the main floor (no further steps)?: Yes  Upon entering residence, is there a bathroom on the main floor (no further steps)?: Yes  In the last 12 months, was there a time when you were not able to pay the mortgage or rent on time?: Yes  In the last 12 months, how many places have you lived?: 1  In the last 12 months, was there a time when you did not have a steady place to sleep or slept in a shelter (including now)?: No  Homeless/housing insecurity resource given?: N/A  Living Arrangements: Lives w/ Spouse/significant other  Is patient a ?: No    Activities of Daily Living Prior to Admission  Functional Status: Independent  Completes ADLs independently?: Yes  Ambulates independently?: Yes  Does patient use assisted devices?: No  Does patient currently own DME?: No  Does patient have a history of Outpatient Therapy (PT/OT)?: Yes ([facility])  Does the patient have a history of Short-Term Rehab?: No  Does patient have a history of HHC?: Yes ([facility])  Does patient currently have [facility]?: No         Patient Information Continued  Income Source: Employed  Does patient have prescription coverage?: Yes  Within the past 12 months, you worried that your food would run out before you got the money to buy more : Never true  Within the past 12 months, the food you bought just didnt last and you didnt have money to get more : Never true  Food insecurity resource given?: N/A  Does patient receive dialysis treatments?: No  Does patient have a history of substance abuse?: No  Does patient have a history of Mental Health Diagnosis?: No         Means of Transportation  Means of Transport to Appts[de-identified] Drives Self  In the past 12 months, has lack of transportation kept you from medical appointments or from getting medications?: No  In the past 12 months, has lack of transportation kept you from meetings, work, or from getting things needed for daily living?: No  Was application for public transport provided?: N/A        DISCHARGE DETAILS:    Discharge planning discussed with[de-identified] Pt and wife at bedside  Freedom of Choice: Yes  Comments - Freedom of Choice: Discussed  CM contacted family/caregiver?: No- see comments (wife at bedside)  Were Treatment Team discharge recommendations reviewed with patient/caregiver?: Yes  Did patient/caregiver verbalize understanding of patient care needs?: Yes       Contacts  Patient Contacts: Char Moran  Relationship to Patient[de-identified] Family  Contact Method: In Person  Reason/Outcome: Discharge [address]         Is the patient interested in [facility] at discharge?: No (pending needs)    DME Referral Provided  Referral made for DME?: No    Other Referral/Resources/Interventions Provided:  Interventions: HHC  Referral Comments: Pt may need homecare   It depends on his identified needs  Discharge Destination Plan[de-identified] Home,Home with [place] at Discharge : Family                                      Additional Comments: CM met with pt and his wife at bedside in the ED  Pt states that he does not use any devices at home  He resides with his wife in a [facility] with 1 nelly  No hx of STR, MH or D&A  Pt has had Dorinda Talbert with [facility] in the past  He has also been to OP PT/OT at [address] [place] [address]  Pt is open to Dorinda Talbert if he needs it  Pt reports that he drives and his wife will provide transportation home

## 2022-04-19 NOTE — ASSESSMENT & PLAN NOTE
· Presented due to cough, SOB, chest tightness over the past couple of days  · 89% on room air, currently on 5 L nasal cannula  · Met sepsis due to tachycardia, tachypnea, WBC 22 31  · Lactic 4 0, 2 hour lactic 1 2 and procalcitonin 0 12  · Had COVID-19 in May 2021  Had required high-flow during that admission   · CTA chest:  · No evidence of pulmonary embolus    · Diffuse patchy nodular airspace opacities throughout the bilateral lower lobes and to lesser extent right middle and left upper lobes compatible with pneumonia  · Due to allergy started on azithromycin, Zosyn and vancomycin, continue  · Respiratory protocol  · Pneumonia labs pending  · Consult pulmonology

## 2022-04-19 NOTE — ASSESSMENT & PLAN NOTE
Lab Results   Component Value Date    HGBA1C 6 1 (H) 01/17/2022       No results for input(s): POCGLU in the last 72 hours      Blood Sugar Average: Last 72 hrs:  · Home regimen:  Metformin 500 mg daily  · Hold oral hypoglycemic  · SSI

## 2022-04-19 NOTE — PROGRESS NOTES
Vancomycin Assessment    Castro Shafer is a 77 y o  male who is currently receiving vancomycin 1000mg every 12 hours for Pneumonia     Relevant clinical data and objective history reviewed:  Creatinine   Date Value Ref Range Status   04/19/2022 1 15 0 60 - 1 30 mg/dL Final     Comment:     Standardized to IDMS reference method   04/14/2022 1 07 0 76 - 1 27 mg/dL Final   01/17/2022 1 10 0 76 - 1 27 mg/dL Final   06/15/2021 0 92 0 76 - 1 27 mg/dL Final   05/20/2021 1 11 0 60 - 1 30 mg/dL Final     Comment:     Standardized to IDMS reference method   05/19/2021 1 16 0 60 - 1 30 mg/dL Final     Comment:     Standardized to IDMS reference method   05/19/2017 0 93 0 76 - 1 27 mg/dL Final   02/05/2016 0 99 0 76 - 1 27 mg/dL Final   02/22/2014 0 91 0 60 - 1 30 mg/dL Final     Comment:     Standardized to IDMS reference method     /55   Pulse 93   Temp 100 4 °F (38 °C) (Temporal)   Resp (!) 24   Ht 5' 11" (1 803 m)   Wt 73 5 kg (162 lb)   SpO2 94%   BMI 22 59 kg/m²   No intake/output data recorded  Lab Results   Component Value Date/Time    BUN 10 04/19/2022 01:31 AM    BUN 11 04/14/2022 10:08 AM    WBC 22 31 (H) 04/19/2022 01:31 AM    WBC 6 7 05/19/2017 07:20 AM    HGB 12 6 04/19/2022 01:31 AM    HGB 14 5 05/19/2017 07:20 AM    HCT 40 5 04/19/2022 01:31 AM    HCT 42 9 05/19/2017 07:20 AM    MCV 86 04/19/2022 01:31 AM    MCV 85 05/19/2017 07:20 AM     (H) 04/19/2022 01:31 AM     05/19/2017 07:20 AM     Temp Readings from Last 3 Encounters:   04/19/22 100 4 °F (38 °C) (Temporal)   04/07/22 (!) 96 5 °F (35 8 °C)   03/31/22 97 7 °F (36 5 °C)     Vancomycin Days of Therapy: 1    Assessment/Plan  The patient is currently on vancomycin utilizing scheduled dosing  Baseline risks associated with therapy include: advanced age  The patient received 1500mg once in ED as loading dose and will continue receiving 1000mg every 12 hours for a goal of 15-20 (appropriate for most indications)   Pharmacy will continue to follow closely for s/sx of nephrotoxicity, infusion reactions, and appropriateness of therapy  BMP and CBC will be ordered per protocol  Plan for trough as patient approaches steady state, prior to the 4th  dose at approximately 1730 on 4/20/22  Pharmacy will continue to follow the patients culture results and clinical progress daily      Bigg Camar[place], Pharmacist, PharmD, BCPS

## 2022-04-19 NOTE — ED PROVIDER NOTES
History  Chief Complaint   Patient presents with    Shortness of Breath     Pt states he's had a cough for 5 days, feels like he cant breathe now  Hx of covid in 2021  denies chest pain but has chest tightness  Pt took NyQuil to help with symptoms before sleep  78 yo M w/ PMH of CHF, CAD, DM2, HLD, aortic valve replacement with bioprosthetic valve, lymphoma, multiple myeloma presents to ED with dyspnea  Ongoing for past few days  Also with cough and chest tightness  Had covid last year  Received covid/flu shots  No active chemo or port  No GI/ complaints  No falls  Productive cough  Is scheduled to get a biopsy in 2 days for eval of recurrent/spreading lymphoma  No leg pain/swelling  No h/o PE/DVT  On asa  History provided by:  Patient and medical records   used: No    Shortness of Breath  Severity:  Moderate  Onset quality:  Gradual  Timing:  Constant  Progression:  Worsening  Chronicity:  New  Context: URI    Relieved by:  Nothing  Worsened by:  Exertion  Ineffective treatments:  None tried  Associated symptoms: chest pain, cough, fever and sputum production    Associated symptoms: no abdominal pain, no diaphoresis, no ear pain, no headaches, no hemoptysis, no neck pain, no rash, no sore throat, no syncope and no vomiting    Risk factors: hx of cancer        Prior to Admission Medications   Prescriptions Last Dose Informant Patient Reported?  Taking?   aspirin 81 MG tablet  Self Yes No   Sig: Take 1 tablet (81 mg total) by mouth daily   atorvastatin (LIPITOR) 40 mg tablet  Self No No   Sig: TAKE 1 TABLET BY MOUTH EVERY DAY   cholecalciferol (VITAMIN D3) 1,000 units tablet  Self No No   Sig: Take 2 tablets (2,000 Units total) by mouth daily   ferrous sulfate 324 MG TBEC  Self No No   Sig: Take 1 tablet (324 mg total) by mouth daily with breakfast   furosemide (LASIX) 20 mg tablet  Self No No   Sig: TAKE 1 TABLET BY MOUTH TWICE A DAY   metFORMIN (GLUCOPHAGE-XR) 500 mg 24 hr tablet Self No No   Sig: TAKE 1 TABLET BY MOUTH EVERY DAY WITH DINNER   metoprolol succinate (TOPROL-XL) 100 mg 24 hr tablet  Self No No   Sig: TAKE 1 TABLET BY MOUTH EVERY DAY   multivitamin-minerals (CENTRUM ADULTS) tablet  Self No No   Sig: Take 1 tablet by mouth daily   nitroglycerin (NITROSTAT) 0 3 mg SL tablet  Self Yes No   Sig: Nitrostat 0 3 mg sublingual tablet   Place 1 tablet as needed by sublingual route as needed for 90 days     omeprazole (PriLOSEC) 40 MG capsule  Self No No   Sig: Take 1 capsule (40 mg total) by mouth daily      Facility-Administered Medications: None       Past Medical History:   Diagnosis Date    [facility]is ([facility] )     Arthritis     Atrial fibrillation (HCC)     Cancer (HCC)     Cataract     Colitis     Colon cancer ([place] [address] )     Colon polyp     Diabetes mellitus ([place] [address] )     Fatty liver     GERD (gastroesophageal reflux disease)     History of chemotherapy     History of radiation therapy     History of shingles 2018    History of transfusion     Hyperlipidemia     Hypertension     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[facility] )     Pneumonia     Skin cancer        Past Surgical History:   Procedure Laterality Date    AORTIC VALVE REPLACEMENT      CATARACT EXTRACTION Bilateral     COLECTOMY      COLONOSCOPY  11/2019    Prior right hemicolectomy    CORONARY ARTERY BYPASS GRAFT      DENTAL SURGERY      KNEE SURGERY      LYMPHADENECTOMY      OTHER SURGICAL HISTORY      MAZE PROCEDURE    NC TOTAL KNEE ARTHROPLASTY Left 1/28/2019    Procedure: ARTHROPLASTY LEFT KNEE TOTAL;  Surgeon: Sheridan Deng MD;  Location:  MAIN OR;  Service: Orthopedics    SKIN BIOPSY      UPPER GASTROINTESTINAL ENDOSCOPY  11/2019    Schatzki ring    US GUIDED LYMPH NODE BIOPSY RIGHT  12/14/2021       Family History   Problem Relation Age of Onset    Heart block Family     Coronary artery disease Mother     Thrombosis Mother     Hypertension Mother    Jorge Flower Arthritis Mother     Hyperlipidemia Mother     Diabetes Father     Hypertension Father     Arthritis Father     Sudden death Father         cardiac    Colon cancer Paternal Grandfather     Breast cancer Sister     Heart disease Brother         Coronary stents     I have reviewed and agree with the history as documented  E-Cigarette/Vaping    E-Cigarette Use Never User      E-Cigarette/Vaping Substances    Nicotine No     THC No     CBD No     Flavoring No     Other No     Unknown No      Social History     Tobacco Use    Smoking status: Former Smoker     Packs/day: 1 00     Years: 40 00     Pack years: 40 00     Types: Cigarettes     Start date:      Quit date:      Years since quittin 3    Smokeless tobacco: Never Used   Vaping Use    Vaping Use: Never used   Substance Use Topics    Alcohol use: Not Currently     Comment: very rare "beer here and there"    Drug use: No       Review of Systems   Constitutional: Positive for chills, fatigue and fever  Negative for diaphoresis and unexpected weight change  HENT: Negative for congestion, ear pain, rhinorrhea, sore throat, trouble swallowing and voice change  Eyes: Negative for pain and visual disturbance  Respiratory: Positive for cough, sputum production, chest tightness and shortness of breath  Negative for hemoptysis  Cardiovascular: Positive for chest pain  Negative for palpitations, leg swelling and syncope  Gastrointestinal: Negative for abdominal pain, blood in stool, constipation, diarrhea, nausea and vomiting  Genitourinary: Negative for difficulty urinating, dysuria, flank pain and hematuria  Musculoskeletal: Negative for arthralgias, back pain and neck pain  Skin: Negative for rash  Neurological: Negative for dizziness, tremors, syncope, facial asymmetry, speech difficulty, weakness, light-headedness, numbness and headaches  Psychiatric/Behavioral: Negative for confusion and suicidal ideas  The patient is not nervous/anxious          Physical Exam  Physical Exam  Vitals and nursing note reviewed  Constitutional:       General: He is not in acute distress  Appearance: He is well-developed  He is ill-appearing  He is not diaphoretic  HENT:      Head: Normocephalic and atraumatic  Right Ear: External ear normal       Left Ear: External ear normal       Nose: Nose normal       Mouth/Throat:      Mouth: Mucous membranes are moist       Pharynx: Oropharynx is clear  Eyes:      General: No scleral icterus  Right eye: No discharge  Left eye: No discharge  Extraocular Movements: Extraocular movements intact  Conjunctiva/sclera: Conjunctivae normal       Pupils: Pupils are equal, round, and reactive to light  Neck:      Vascular: No JVD  Trachea: No tracheal deviation  Cardiovascular:      Rate and Rhythm: Regular rhythm  Tachycardia present  Pulses: Normal pulses  Heart sounds: Normal heart sounds  No murmur heard  No friction rub  No gallop  Pulmonary:      Effort: Respiratory distress present  Breath sounds: No stridor  Rales (L>R) present  No wheezing  Chest:      Chest wall: No tenderness  Abdominal:      General: Bowel sounds are normal  There is no distension  Palpations: Abdomen is soft  Tenderness: There is no abdominal tenderness  There is no guarding or rebound  Musculoskeletal:         General: No tenderness or deformity  Normal range of motion  Cervical back: Normal range of motion and neck supple  No rigidity  Right lower leg: No edema  Left lower leg: No edema  Lymphadenopathy:      Cervical: No cervical adenopathy  Skin:     General: Skin is warm and dry  Findings: No rash  Neurological:      General: No focal deficit present  Mental Status: He is alert and oriented to person, place, and time  Cranial Nerves: No cranial nerve deficit  Sensory: No sensory deficit        Coordination: Coordination normal    Psychiatric:         Behavior: Behavior normal          Vital Signs  ED Triage Vitals [04/19/22 0122]   Temperature Pulse Respirations Blood Pressure SpO2   100 4 °F (38 °C) (!) 114 (!) 24 (!) 174/92 (!) 89 %      Temp Source Heart Rate Source Patient Position - Orthostatic VS BP Location FiO2 (%)   Temporal Monitor -- -- --      Pain Score       No Pain           Vitals:    04/19/22 0122 04/19/22 0200   BP: (!) 174/92 154/70   Pulse: (!) 114 104         Visual Acuity      ED Medications  Medications   azithromycin (ZITHROMAX) 500 mg in sodium chloride 0 9% 250mL IVPB 500 mg (500 mg Intravenous New Bag 4/19/22 0245)   vancomycin (VANCOCIN) 1500 mg in sodium chloride 0 9% 250 mL IVPB (has no administration in time range)   sodium chloride 0 9 % bolus 250 mL (250 mL Intravenous New Bag 4/19/22 0211)   piperacillin-tazobactam (ZOSYN) IVPB 4 5 g (0 g Intravenous Stopped 4/19/22 0245)   potassium chloride oral solution 20 mEq (20 mEq Oral Given 4/19/22 0209)   iohexol (OMNIPAQUE) 350 MG/ML injection (SINGLE-DOSE) 90 mL (90 mL Intravenous Given 4/19/22 0237)       Diagnostic Studies  Results Reviewed     Procedure Component Value Units Date/Time    Strep Pneumoniae, Urine [[id number]] Collected: 04/19/22 0249    Lab Status: In process Specimen: Urine, Clean Catch Updated: 04/19/22 0255    COVID/FLU/RSV - 2 hour TAT [[id number]]  (Normal) Collected: 04/19/22 0153    Lab Status: Final result Specimen: Nares from Nose Updated: 04/19/22 0236     SARS-CoV-2 Negative     INFLUENZA A PCR Negative     INFLUENZA B PCR Negative     RSV PCR Negative    Narrative:      FOR PEDIATRIC PATIENTS - copy/paste COVID Guidelines URL to browser: [email or web address]    SARS-CoV-2 assay is a Nucleic Acid Amplification assay intended for the  qualitative detection of nucleic acid from SARS-CoV-2 in nasopharyngeal  swabs  Results are for the presumptive identification of SARS-CoV-2 RNA      Positive results are indicative of infection with SARS-CoV-2, the virus  causing COVID-19, but do not rule out bacterial infection or co-infection  with other viruses  Laboratories within the [place] and its  territories are required to report all positive results to the appropriate  public health authorities  Negative results do not preclude SARS-CoV-2  infection and should not be used as the sole basis for treatment or other  patient management decisions  Negative results must be combined with  clinical observations, patient history, and epidemiological information  This test has not been FDA cleared or approved  This test has been authorized by FDA under an Emergency Use Authorization  (EUA)  This test is only authorized for the duration of time the  declaration that circumstances exist justifying the authorization of the  emergency use of an in vitro diagnostic tests for detection of SARS-CoV-2  virus and/or diagnosis of COVID-19 infection under section 564(b)(1) of  the Act, 21 U  S C  151NIO-9(A)(4), unless the authorization is terminated  or revoked sooner  The test has been validated but independent review by FDA  and CLIA is pending  Test performed using [employer] Gene[employer]pert: This RT-PCR assay targets N2,  a region unique to SARS-CoV-2  A conserved region in the E-gene was chosen  for pan-Sarbecovirus detection which includes SARS-CoV-2  Procalcitonin [[id number]]  (Normal) Collected: 04/19/22 0131    Lab Status: Final result Specimen: Blood from Arm, Left Updated: 04/19/22 0211     Procalcitonin 0 12 ng/ml     Lactic acid [[id number]]  (Abnormal) Collected: 04/19/22 0131    Lab Status: Final result Specimen: Blood from Arm, Left Updated: 04/19/22 0211     LACTIC ACID 4 0 mmol/L     Narrative:      Result may be elevated if tourniquet was used during collection      Lactic acid 2 Hours [[id number]]     Lab Status: No result Specimen: Blood     HS Troponin I 2hr [[id number]]     Lab Status: No result Specimen: Blood     HS Troponin 0hr (reflex protocol) [[id number]]  (Normal) Collected: 04/19/22 0131    Lab Status: Final result Specimen: Blood from Arm, Left Updated: 04/19/22 0208     hs TnI 0hr 12 ng/L     Magnesium [[id number]]  (Normal) Collected: 04/19/22 0131    Lab Status: Final result Specimen: Blood from Arm, Left Updated: 04/19/22 0208     Magnesium 1 8 mg/dL     NT-BNP PRO [[id number]]  (Abnormal) Collected: 04/19/22 0131    Lab Status: Final result Specimen: Blood from Arm, Left Updated: 04/19/22 0208     NT-proBNP 2,575 pg/mL     Legionella antigen, urine [[id number]] Collected: 04/19/22 0201    Lab Status:  In process Specimen: Urine, Clean Catch Updated: 04/19/22 0203    Comprehensive metabolic panel [[id number]]  (Abnormal) Collected: 04/19/22 0131    Lab Status: Final result Specimen: Blood from Arm, Left Updated: 04/19/22 0202     Sodium 137 mmol/L      Potassium 3 0 mmol/L      Chloride 98 mmol/L      CO2 28 mmol/L      ANION GAP 11 mmol/L      BUN 10 mg/dL      Creatinine 1 15 mg/dL      Glucose 170 mg/dL      Calcium 8 9 mg/dL      AST 11 U/L      ALT 13 U/L      Alkaline Phosphatase 153 U/L      Total Protein 7 4 g/dL      Albumin 3 6 g/dL      Total Bilirubin 0 70 mg/dL      eGFR 61 ml/min/1 73sq m     Narrative:      [place] guidelines for Chronic Kidney Disease (CKD):     Stage 1 with normal or high GFR (GFR > 90 mL/min/1 73 square meters)    Stage 2 Mild CKD (GFR = 60-89 mL/min/1 73 square meters)    Stage 3A Moderate CKD (GFR = 45-59 mL/min/1 73 square meters)    Stage 3B Moderate CKD (GFR = 30-44 mL/min/1 73 square meters)    Stage 4 Severe CKD (GFR = 15-29 mL/min/1 73 square meters)    Stage 5 End Stage CKD (GFR <15 mL/min/1 73 square meters)  Note: GFR calculation is accurate only with a steady state creatinine    D-Dimer [[id number]]  (Abnormal) Collected: 04/19/22 0131    Lab Status: Final result Specimen: Blood from Arm, Left Updated: 04/19/22 0202     D-Dimer, Quant 1 86 ug/ml FEU Narrative: In the evaluation for possible pulmonary embolism, in the appropriate (Well's Score of 4 or less) patient, the age adjusted d-dimer cutoff for this patient can be calculated as:    Age x 0 01 (in ug/mL) for Age-adjusted D-dimer exclusion threshold for a patient over 50 years  Protime-INR [[id number]]  (Abnormal) Collected: 04/19/22 0131    Lab Status: Final result Specimen: Blood from Arm, Left Updated: 04/19/22 0202     Protime 15 4 seconds      INR 1 24    APTT [[id number]]  (Normal) Collected: 04/19/22 0131    Lab Status: Final result Specimen: Blood from Arm, Left Updated: 04/19/22 0202     PTT 30 seconds     Sputum culture and Gram stain [[id number]]     Lab Status: No result Specimen: Sputum     CBC and differential [[id number]]  (Abnormal) Collected: 04/19/22 0131    Lab Status: Final result Specimen: Blood from Arm, Left Updated: 04/19/22 0142     WBC 22 31 Thousand/uL      RBC 4 69 Million/uL      Hemoglobin 12 6 g/dL      Hematocrit 40 5 %      MCV 86 fL      MCH 26 9 pg      MCHC 31 1 g/dL      RDW 15 3 %      MPV 9 2 fL      Platelets 950 Thousands/uL      nRBC 0 /100 WBCs      Neutrophils Relative 69 %      Immat GRANS % 1 %      Lymphocytes Relative 22 %      Monocytes Relative 6 %      Eosinophils Relative 1 %      Basophils Relative 1 %      Neutrophils Absolute 15 58 Thousands/µL      Immature Grans Absolute 0 14 Thousand/uL      Lymphocytes Absolute 4 93 Thousands/µL      Monocytes Absolute 1 23 Thousand/µL      Eosinophils Absolute 0 31 Thousand/µL      Basophils Absolute 0 12 Thousands/µL     Blood culture #1 [[id number]] Collected: 04/19/22 0131    Lab Status: In process Specimen: Blood from Arm, Left Updated: 04/19/22 0137    Blood culture #2 [[id number]] Collected: 04/19/22 0131    Lab Status: In process Specimen: Blood from Arm, Right Updated: 04/19/22 0137                 CTA ED chest PE Study   Final Result by Kostas Frazier MD (04/19 0256)      No evidence of pulmonary embolus  Diffuse patchy nodular airspace opacities throughout the bilateral lower lobes and to lesser extent right middle and left upper lobes compatible with pneumonia  1 3 cm juxtapleural right lower lobe nodule abutting the inferior aspect of the right major fissure has increased in size since 10/20/2021, concerning for disease progression  Previously noted right middle lobe masslike consolidation has resolved  Increasing mediastinal adenopathy, either reflecting reactive changes or disease progression  Workstation performed: [id number]         XR chest 1 view portable   ED Interpretation by Juan Jose Corrales MD (11/96 2248)   Pulmonary edema vs consolidation, R>L                 Procedures  ECG 12 Lead Documentation Only    Date/Time: 4/19/2022 1:37 AM  Performed by: Juan Jose Corrales MD  Authorized by: Juan Jose Corrales MD     Indications / Diagnosis:  Cp sob  ECG reviewed by me, the ED Provider: yes    Patient location:  ED  Previous ECG:     Previous ECG:  Compared to current    Similarity:  No change    Comparison to cardiac monitor: Yes    Interpretation:     Interpretation: abnormal    Quality:     Tracing quality:  Limited by artifact  Rate:     ECG rate:  109    ECG rate assessment: tachycardic    Rhythm:     Rhythm: sinus tachycardia    Ectopy:     Ectopy: none    QRS:     QRS axis:  Left    QRS intervals:   Wide  Conduction:     Conduction: abnormal      Abnormal conduction: non-specific intraventricular conduction delay    ST segments:     ST segments:  Non-specific  T waves:     T waves: non-specific      CriticalCare Time  Performed by: Juan Jose Corrales MD  Authorized by: Juan Jose Corrales MD     Critical care provider statement:     Critical care time (minutes):  90    Critical care time was exclusive of:  Separately billable procedures and treating other patients and teaching time    Critical care was necessary to treat or prevent imminent or life-threatening deterioration of the following conditions:  Respiratory failure and sepsis    Critical care was time spent personally by me on the following activities:  Blood draw for specimens, obtaining history from patient or surrogate, development of treatment plan with patient or surrogate, evaluation of patient's response to treatment, examination of patient, review of old charts, re-evaluation of patient's condition, ordering and review of radiographic studies, ordering and review of laboratory studies and ordering and performing treatments and interventions    I assumed direction of critical care for this patient from another provider in my specialty: no               ED Course  ED Course as of 04/19/22 0303   Tue Apr 19, 2022   0205 The 30ml/kg fluid bolus was not given to the patient despite hypotension and/or significantly elevated lactate of = 4 and/or presence of septic shock due to: Concern for fluid/volume overload  The patient will be administered 250 ml of crystalloid fluid instead  Orders for this have been placed in Epic  The patient may receive additional colloid or crystalloid fluids thereafter based on clinical condition  Tahira Chan MD    0225 Lactate noted  Getting gentle fluids due to concomitant fluid overload  Feeling improved  CT pend, will admit  On 5L NC O2 currently  Abx running, cultures drawn  K replaced  0259 CT noted  Will admit  Initial Sepsis Screening     Row Name 04/19/22 0224 04/19/22 0141             Is the patient's history suggestive of a new or worsening infection? Yes (Proceed)  -SM Yes (Proceed)  -SM       Suspected source of infection pneumonia  -SM pneumonia  -SM       Are two or more of the following signs & symptoms of infection both present and new to the patient? Yes (Proceed)  -SM --       Indicate SIRS criteria Leukocytosis (WBC > 47143 IJL); Tachypnea > 20 resp per min; Tachycardia > 90 bpm  -SM Tachycardia > 90 bpm;Tachypnea > 20 resp per min  -SM If the answer is yes to both questions, suspicion of sepsis is present -- --       If severe sepsis is present AND tissue hypoperfusion perists in the hour after fluid resuscitation or lactate > 4, the patient meets criteria for SEPTIC SHOCK -- --       Are any of the following organ dysfunction criteria present within 6 hours of suspected infection and SIRS criteria that are NOT considered to be chronic conditions? -- --       Organ dysfunction Lactate >/equal 4 0 mmol/L (MEETS CRITERIA FOR SEPTIC SHOCK)  - --       Date of presentation of severe sepsis 04/19/22 - --       Time of presentation of severe sepsis 0225 -SM --       Tissue hypoperfusion persists in the hour after crystalloid fluid administration, evidenced, by either: -- --       Was hypotension present within one hour of the conclusion of crystalloid fluid administration? -- --       Date of presentation of septic shock -- --       Time of presentation of septic shock -- --             User Key  (r) = Recorded By, (t) = Taken By, (c) = Cosigned By    [address] Name Provider Maria Luz Mckeon MD Physician                              MDM  Number of Diagnoses or Management Options  Hypoxia: new and requires workup  Pneumonia: new and requires workup  Sepsis [facility]): new and requires workup     Amount and/or Complexity of Data Reviewed  Clinical lab tests: ordered and reviewed  Tests in the radiology section of CPT®: reviewed and ordered  Tests in the medicine section of CPT®: ordered and reviewed  Review and summarize past medical records: yes  Discuss the patient with other providers: yes  Independent visualization of images, tracings, or specimens: yes    Risk of Complications, Morbidity, and/or Mortality  Presenting problems: moderate  Diagnostic procedures: low  Management options: moderate    Patient Progress  Patient progress: improved      Disposition  Final diagnoses:   Sepsis ([address] )   Pneumonia   Hypoxia   CHF (congestive heart failure) ([facility] )     Time reflects when diagnosis was documented in both MDM as applicable and the Disposition within this note     Time User Action Codes Description Comment    4/19/2022  2:25 AM Cam[place] Ruffini S Add [A41 9] Sepsis ([facility] )     4/19/2022  3:01 AM [place] Ruffini S Add [J18 9] Pneumonia     4/19/2022  3:01 AM [place] Ruffini S Add [R09 02] Hypoxia     4/19/2022  3:03 AM [place] Ruffini S Add [I50 9] CHF (congestive heart failure) [facility])       ED Disposition     ED Disposition Condition Date/Time Comment    Admit Stable Tue Apr 19, 2022  3:01 AM Case was discussed with Ry Mendez and the patient's admission status was agreed to be Admission Status: inpatient status to the service of Dr Domingo Sahu   Follow-up Information    None         Patient's Medications   Discharge Prescriptions    No medications on file       No discharge procedures on file      PDMP Review       Value Time User    PDMP Reviewed  Yes 4/10/2021 12:23 PM Richie Pyle DO          ED Provider  Electronically Signed by           Jose Enrique Cedillo MD  04/19/22 9634       Jose Enrique Cedillo MD  04/19/22 0814

## 2022-04-19 NOTE — SEPSIS NOTE
Sepsis Note   Brooks Womack 77 y o  male MRN: [id number]  Unit/Bed#: ED 02 Encounter: [id number]       qSOFA     [address] Name 04/19/22 0445 04/19/22 0430 04/19/22 0415 04/19/22 0345 04/19/22 0330    Altered mental status GCS < 15 -- -- -- -- --    Respiratory Rate > / =22 1 0 0 1 1    Systolic BP < / =344 0 0 0 0 0    Q Sofa Score 1 0 0 1 1    Row Name 04/19/22 0300 04/19/22 0200 04/19/22 0122          Altered mental status GCS < 15 -- -- --      Respiratory Rate > / =22 1 1 1      Systolic BP < / =011 0 0 0      Q Sofa Score 1 1 1                 Initial Sepsis Screening     Row Name 04/19/22 0224 04/19/22 0141             Is the patient's history suggestive of a new or worsening infection? Yes (Proceed)  -SM Yes (Proceed)  -SM       Suspected source of infection pneumonia  - pneumonia  -       Are two or more of the following signs & symptoms of infection both present and new to the patient? Yes (Proceed)  -SM --       Indicate SIRS criteria Leukocytosis (WBC > 00585 IJL); Tachypnea > 20 resp per min; Tachycardia > 90 bpm  - Tachycardia > 90 bpm;Tachypnea > 20 resp per min  -       If the answer is yes to both questions, suspicion of sepsis is present -- --       If severe sepsis is present AND tissue hypoperfusion perists in the hour after fluid resuscitation or lactate > 4, the patient meets criteria for SEPTIC SHOCK -- --       Are any of the following organ dysfunction criteria present within 6 hours of suspected infection and SIRS criteria that are NOT considered to be chronic conditions? -- --       Organ dysfunction Lactate >/equal 4 0 mmol/L (MEETS CRITERIA FOR SEPTIC SHOCK)  - --       Date of presentation of severe sepsis 04/19/22  - --       Time of presentation of severe sepsis 0225 -SM --       Tissue hypoperfusion persists in the hour after crystalloid fluid administration, evidenced, by either: -- --       Was hypotension present within one hour of the conclusion of crystalloid fluid administration? -- --       Date of presentation of septic shock -- --       Time of presentation of septic shock -- --             User Key  (r) = Recorded By, (t) = Taken By, (c) = Cosigned By    [address] Name Provider Type     Jenn Salazar MD Physician                  Default Flowsheet Data (last 720 hours)     Sepsis Reassess     Row Name 04/19/22 050                   Repeat Volume Status and Tissue Perfusion Assessment Performed    Repeat Volume Status and Tissue Perfusion Assessment Performed Yes  -                  Volume Status and Tissue Perfusion Post Fluid Resuscitation * Must Document All *    Vital Signs Reviewed (HR, RR, BP, T) Yes  HR:  89, respirations 26, /58, temperature 100 4°  -        Shock Index Reviewed Yes  -        Arterial Oxygen Saturation Reviewed (POx, SaO2 or SpO2) Yes (comment %)  97%  -        Cardio Regular rate and rhythm  -        Pulmonary Normal effort  -        Capillary Refill Brisk  -        Peripheral Pulses --        Skin Normal  -        Urine output assessed Adequate  -                  *OR*   Intensive Monitoring- Must Document One of the Following Four *:    Vital Signs Reviewed --        * Central Venous Pressure (CVP or RAP) --        * Central Venous Oxygen (SVO2, ScvO2 or Oxygen saturation via central catheter) --        * Bedside Cardiovascular US in IVC diameter and % collapse --        * Passive Leg Raise OR Crystalloid Challenge --              User Key  (r) = Recorded By, (t) = Taken By, (c) = Cosigned By    Initials Name Provider Type     Ryan Merrill PA-C Physician Assistant

## 2022-04-19 NOTE — ASSESSMENT & PLAN NOTE
· Met sepsis due to tachycardia, tachypnea, WBC 22 31  · Lactic 4 0, 2 hour lactic 1 2 and procalcitonin 0 12  · Started on azithromycin, Zosyn and vancomycin, continue  · Patient with history of CHF, elevated BNP, signs of pleural effusion on CT chest  · Did not received 30 cc/kilogram in fluids  · Lactic cleared after only 250 mL bolus    Unclear if initial lactic was accurate  · Blood cultures, pneumonia labs pending

## 2022-04-19 NOTE — ASSESSMENT & PLAN NOTE
· Patient with erythema and swelling of tongue that has been ongoing since COVID in May 2021   · Not occluding airway  · Has burning sensation when trying to eat or drink  · Per patient has showed too many providers but unclear treatment  · Consult pulmonology and nutrition

## 2022-04-19 NOTE — OCCUPATIONAL THERAPY NOTE
Occupational Therapy Cancellation    Patient Name: America De León  Today's Date: 4/19/2022 04/19/22 1430   OT Last Visit   OT Visit Date 04/19/22   Note Type   Note type Cancelled Session   Cancel Reasons Patient off floor/test   Additional Comments OT orders received and chart reviewed  Attempted to see pt in ED for OT evaluation  Pt BLAKE; being transferred to inpatient bed  OT to follow up on med surg floor for evaluate as schedule permits       [employer]), MS, OTR/L no

## 2022-04-19 NOTE — H&P
Otto Macias  H&P- Alexia Cristina 1945, 77 y o  male MRN: [id number]  Unit/Bed#: ED 02 Encounter: [id number]  Primary Care Provider: Salome Lni DO   Date and time admitted to hospital: 4/19/2022  1:20 AM    * Pneumonia due to infectious organism  Assessment & Plan  · Presented due to cough, SOB, chest tightness over the past couple of days  · 89% on room air, currently on 5 L nasal cannula  · Met sepsis due to tachycardia, tachypnea, WBC 22 31  · Lactic 4 0, 2 hour lactic 1 2 and procalcitonin 0 12  · Had COVID-19 in May 2021  Had required high-flow during that admission   · CTA chest:  · No evidence of pulmonary embolus  · Diffuse patchy nodular airspace opacities throughout the bilateral lower lobes and to lesser extent right middle and left upper lobes compatible with pneumonia  · Due to allergy started on azithromycin, Zosyn and vancomycin, continue  · Respiratory protocol  · Pneumonia labs pending  · Consult pulmonology    Sepsis [facility])  Assessment & Plan  · Met sepsis due to tachycardia, tachypnea, WBC 22 31  · Lactic 4 0, 2 hour lactic 1 2 and procalcitonin 0 12  · Started on azithromycin, Zosyn and vancomycin, continue  · Patient with history of CHF, elevated BNP, signs of pleural effusion on CT chest  · Did not received 30 cc/kilogram in fluids  · Lactic cleared after only 250 mL bolus    Unclear if initial lactic was accurate  · Blood cultures, pneumonia labs pending    Hypoxia  Assessment & Plan  · 89% on room air, currently on 5 L nasal cannula  · Respiratory protocol  · Receiving antibiotics for pneumonia  · Continue to monitor and wean oxygen as able    Chest pain  Assessment & Plan  · Patient reporting chest pain over the past couple of days  · EKG without changes compared to prior  · Troponin 12, 2 hour pending  · Patient found to have pneumonia and hypoxic  · Chest pain is most likely pleuritic  · On telemetry as precaution    Red tongue  Assessment & Plan  · Patient with erythema and swelling of tongue that has been ongoing since COVID in May 2021   · Not occluding airway  · Has burning sensation when trying to eat or drink  · Per patient has showed too many providers but unclear treatment  · Consult pulmonology and nutrition    Lymphoma [facility])  Assessment & Plan  · CTA chest:  · 1 3 cm juxtapleural right lower lobe nodule abutting the inferior aspect of the right major fissure has increased in size since 10/20/2021, concerning for disease progression  Previously noted right middle lobe masslike consolidation has resolved  · Increasing mediastinal adenopathy, either reflecting reactive changes or disease progression  · Consult pulmonology    Paroxysmal atrial fibrillation [facility])  Assessment & Plan  · Continue metoprolol      Chronic systolic congestive heart failure (HCC)  Assessment & Plan  Wt Readings from Last 3 Encounters:   04/19/22 73 5 kg (162 lb)   04/18/22 73 3 kg (161 lb 9 6 oz)   04/07/22 73 5 kg (162 lb)     · Small left and trace right pleural effusion seen on imaging  Appears euvolemic on exam  · BNP 2575  · Monitor I/O and daily weights  · ECHO 05/2021: EF 40 %  · Continue home Lasix 20 mg b i d  Essential hypertension  Assessment & Plan  · Continue metoprolol succinate 100 mg daily    Dyslipidemia  Assessment & Plan  · Continue statin    Type 2 diabetes mellitus without complication, without long-term current use of insulin ([facility])  Assessment & Plan  Lab Results   Component Value Date    HGBA1C 6 1 (H) 01/17/2022       No results for input(s): POCGLU in the last 72 hours      Blood Sugar Average: Last 72 hrs:  · Home regimen:  Metformin 500 mg daily  · Hold oral hypoglycemic  · SSI    Coronary artery disease involving native heart with angina pectoris, unspecified vessel or lesion type [facility])  Assessment & Plan  · S/p CABG  · Continue home aspirin, statin and beta-blocker    VTE Pharmacologic Prophylaxis: VTE Score: 3 Moderate Risk (Score 3-4) - Pharmacological DVT Prophylaxis Ordered: heparin  Code Status: Level 1 - Full Code   Discussion with family: Updated  (wife) at bedside  Anticipated Length of Stay: Patient will be admitted on an inpatient basis with an anticipated length of stay of greater than 2 midnights secondary to Pneumonia, hypoxia  Total Time for Visit, including Counseling / Coordination of Care: 60 minutes Greater than 50% of this total time spent on direct patient counseling and coordination of care  Chief Complaint:  Shortness of breath    History of Present Illness:  Daria Brush is a 77 y o  male with a PMH of type 2 diabetes, PAF, lymphoma, hypertension, dyslipidemia, coronary artery disease, CHF who presents from home due to cough, shortness of breath, chest tightness over the past couple of days  In the ER patient 89% on room air, currently on 5 L nasal cannula  Met sepsis criteria due to tachycardia, tachypnea and elevated WBC at 22 31  Patient concerned about having COVID again  Had COVID in May 2021, required extensive hospital stay and was placed on high-flow during admission  Patient was discharged at the end of May on 2 L nasal cannula which he required over the next month  Patient reports that he has not required oxygen since then  Recently got his 2nd booster shot  COVID, flu and RSV is negative today  Signs of pneumonia on CTA chest      Review of Systems:  Review of Systems   Constitutional: Negative for fatigue and fever  HENT: Negative for sore throat  Tongue burning    Respiratory: Positive for cough, chest tightness and shortness of breath  Cardiovascular: Negative for chest pain  Gastrointestinal: Negative for abdominal distention, abdominal pain, diarrhea, nausea and vomiting  Genitourinary: Negative for difficulty urinating  Musculoskeletal: Negative for arthralgias  Neurological: Negative for weakness and headaches     Psychiatric/Behavioral: Negative for agitation and behavioral problems  All other systems reviewed and are negative  Past Medical and Surgical History:   Past Medical History:   Diagnosis Date    [facility]     Arthritis     Atrial fibrillation (HCC)     Cancer ([facility] )     Cataract     Colitis     Colon cancer ([facility] )     Colon polyp     Diabetes mellitus ([place] [address] )     Fatty liver     GERD (gastroesophageal reflux disease)     History of chemotherapy     History of radiation therapy     History of shingles 2018    History of transfusion     Hyperlipidemia     Hypertension     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[facility] )     Pneumonia     Skin cancer        Past Surgical History:   Procedure Laterality Date    AORTIC VALVE REPLACEMENT      CATARACT EXTRACTION Bilateral     COLECTOMY      COLONOSCOPY  11/2019    Prior right hemicolectomy    CORONARY ARTERY BYPASS GRAFT      DENTAL SURGERY      KNEE SURGERY      LYMPHADENECTOMY      OTHER SURGICAL HISTORY      MAZE PROCEDURE    MN TOTAL KNEE ARTHROPLASTY Left 1/28/2019    Procedure: ARTHROPLASTY LEFT KNEE TOTAL;  Surgeon: Tony Bailey MD;  Location:  MAIN OR;  Service: Orthopedics    SKIN BIOPSY      UPPER GASTROINTESTINAL ENDOSCOPY  11/2019    Schatzki ring    US GUIDED LYMPH NODE BIOPSY RIGHT  12/14/2021       Meds/Allergies:  Prior to Admission medications    Medication Sig Start Date End Date Taking?  Authorizing Provider   aspirin 81 MG tablet Take 1 tablet (81 mg total) by mouth daily 3/29/19   Mercedes Bennett MD   atorvastatin (LIPITOR) 40 mg tablet TAKE 1 TABLET BY MOUTH EVERY DAY 8/18/21   Mercedes Bennett MD   cholecalciferol (VITAMIN D3) 1,000 units tablet Take 2 tablets (2,000 Units total) by mouth daily 4/11/21 April Kehr, DO   ferrous sulfate 324 MG TBEC Take 1 tablet (324 mg total) by mouth daily with breakfast 8/12/21   Sada Aguilar DO   furosemide (LASIX) 20 mg tablet TAKE 1 TABLET BY MOUTH TWICE A DAY 6/27/21   Sada Aguilar DO   metFORMIN (GLUCOPHAGE-XR) 500 mg 24 hr tablet TAKE 1 TABLET BY MOUTH EVERY DAY WITH DINNER 12/3/21   Joann Estevez DO   metoprolol succinate (TOPROL-XL) 100 mg 24 hr tablet TAKE 1 TABLET BY MOUTH EVERY DAY 3/30/22   Joann Estevez DO   multivitamin-minerals (CENTRUM ADULTS) tablet Take 1 tablet by mouth daily 21   Juan Manzo DO   nitroglycerin (NITROSTAT) 0 3 mg SL tablet Nitrostat 0 3 mg sublingual tablet   Place 1 tablet as needed by sublingual route as needed for 90 days  Historical Provider, MD   omeprazole (PriLOSEC) 40 MG capsule Take 1 capsule (40 mg total) by mouth daily 3/17/22   Joann Estevez DO     I have reviewed home medications with patient personally  Allergies: Allergies   Allergen Reactions    Ceftin [Cefuroxime] Itching     However, has tolerated Cefazolin and Pip-Tazo since, which have different side chains  Be cautious with / avoid 2nd, 3rd, or 4th Gen Cephs that have similar side chains to Cefuroxime      Lantus [Insulin Glargine] Itching       Social History:  Marital Status: /Civil Union   Occupation:  Unknown  Patient Pre-hospital Living Situation: Home  Patient Pre-hospital Level of Mobility: walks  Patient Pre-hospital Diet Restrictions: diabetic   Substance Use History:   Social History     Substance and Sexual Activity   Alcohol Use Not Currently    Comment: very rare "beer here and there"     Social History     Tobacco Use   Smoking Status Former Smoker    Packs/day: 1 00    Years: 40 00    Pack years: 40 00    Types: Cigarettes    Start date:     Quit date:     Years since quittin 3   Smokeless Tobacco Never Used     Social History     Substance and Sexual Activity   Drug Use No       Family History:  Family History   Problem Relation Age of Onset    Heart block Family     Coronary artery disease Mother     Thrombosis Mother     Hypertension Mother     Arthritis Mother     Hyperlipidemia Mother     Diabetes Father     Hypertension Father     Arthritis Father    [facility] Sudden death Father         cardiac    Colon cancer Paternal Grandfather     Breast cancer Sister     Heart disease Brother         Coronary stents       Physical Exam:     Vitals:   Blood Pressure: 117/58 (04/19/22 0445)  Pulse: 89 (04/19/22 0445)  Temperature: 100 4 °F (38 °C) (04/19/22 0122)  Temp Source: Temporal (04/19/22 0122)  Respirations: (!) 26 (04/19/22 0445)  Height: 5' 11" (180 3 cm) (04/19/22 0122)  Weight - Scale: 73 5 kg (162 lb) (04/19/22 0122)  SpO2: 97 % (04/19/22 0445)    Physical Exam  Vitals and nursing note reviewed  Constitutional:       Appearance: Normal appearance  Interventions: Nasal cannula in place  HENT:      Head: Normocephalic  Mouth/Throat:      Comments: Tongue is erythematous and appears swollen  Not occluding airway  Eyes:      Extraocular Movements: Extraocular movements intact  Pupils: Pupils are equal, round, and reactive to light  Cardiovascular:      Rate and Rhythm: Normal rate and regular rhythm  Heart sounds: No murmur heard  No gallop  Pulmonary:      Effort: No respiratory distress  Breath sounds: No wheezing  Comments: Decreased breath sounds  Abdominal:      General: Bowel sounds are normal  There is no distension  Tenderness: There is no abdominal tenderness  Musculoskeletal:         General: Normal range of motion  Cervical back: Normal range of motion  Right lower leg: No edema  Left lower leg: No edema  Skin:     General: Skin is warm  Neurological:      General: No focal deficit present  Mental Status: He is alert and oriented to person, place, and time  Mental status is at baseline  Psychiatric:         Mood and Affect: Mood normal          Behavior: Behavior normal          Thought Content:  Thought content normal       *    Additional Data:     Lab Results:  Results from last 7 days   Lab Units 04/19/22  0131   WBC Thousand/uL 22 31*   HEMOGLOBIN g/dL 12 6   HEMATOCRIT % 40 5 PLATELETS Thousands/uL 396*   NEUTROS PCT % 69   LYMPHS PCT % 22   MONOS PCT % 6   EOS PCT % 1     Results from last 7 days   Lab Units 04/19/22  0131   SODIUM mmol/L 137   POTASSIUM mmol/L 3 0*   CHLORIDE mmol/L 98*   CO2 mmol/L 28   BUN mg/dL 10   CREATININE mg/dL 1 15   ANION GAP mmol/L 11   CALCIUM mg/dL 8 9   ALBUMIN g/dL 3 6   TOTAL BILIRUBIN mg/dL 0 70   ALK PHOS U/L 153*   ALT U/L 13   AST U/L 11   GLUCOSE RANDOM mg/dL 170*     Results from last 7 days   Lab Units 04/19/22  0131   INR  1 24*             Results from last 7 days   Lab Units 04/19/22  0410 04/19/22  0131   LACTIC ACID mmol/L 1 2 4 0*   PROCALCITONIN ng/ml  --  0 12       Imaging: Reviewed radiology reports from this admission including: chest CT scan  CTA ED chest PE Study   Final Result by Cole Tim MD (04/19 0256)      No evidence of pulmonary embolus  Diffuse patchy nodular airspace opacities throughout the bilateral lower lobes and to lesser extent right middle and left upper lobes compatible with pneumonia  1 3 cm juxtapleural right lower lobe nodule abutting the inferior aspect of the right major fissure has increased in size since 10/20/2021, concerning for disease progression  Previously noted right middle lobe masslike consolidation has resolved  Increasing mediastinal adenopathy, either reflecting reactive changes or disease progression  Workstation performed: [id number]         XR chest 1 view portable   ED Interpretation by Ayden Green MD (04/19 4579)   Pulmonary edema vs consolidation, R>L          ** Please Note: This note has been constructed using a voice recognition system   **

## 2022-04-19 NOTE — ASSESSMENT & PLAN NOTE
· CTA chest:  · 1 3 cm juxtapleural right lower lobe nodule abutting the inferior aspect of the right major fissure has increased in size since 10/20/2021, concerning for disease progression  Previously noted right middle lobe masslike consolidation has resolved  · Increasing mediastinal adenopathy, either reflecting reactive changes or disease progression    · Consult pulmonology

## 2022-04-19 NOTE — PHYSICAL THERAPY NOTE
PT cancel     04/19/22 0119   PT Last Visit   PT Visit Date 04/19/22   Note Type   Note type Cancelled Session   Cancel Reasons Patient off floor/test   Additional Comments being transferred to a room and completing admission, will recheck in [facility], PT

## 2022-04-20 PROBLEM — K14.0 GLOSSITIS: Status: ACTIVE | Noted: 2022-04-20

## 2022-04-20 PROBLEM — J96.01 ACUTE RESPIRATORY FAILURE WITH HYPOXIA (HCC): Status: ACTIVE | Noted: 2022-04-20

## 2022-04-20 PROBLEM — R13.12 OROPHARYNGEAL DYSPHAGIA: Status: ACTIVE | Noted: 2019-10-11

## 2022-04-20 PROBLEM — J13 PNEUMONIA DUE TO STREPTOCOCCUS PNEUMONIAE (HCC): Status: ACTIVE | Noted: 2019-04-22

## 2022-04-20 LAB
ALBUMIN SERPL BCP-MCNC: 2.5 G/DL (ref 3.5–5)
ALP SERPL-CCNC: 106 U/L (ref 46–116)
ALT SERPL W P-5'-P-CCNC: 9 U/L (ref 12–78)
ANION GAP SERPL CALCULATED.3IONS-SCNC: 8 MMOL/L (ref 4–13)
AST SERPL W P-5'-P-CCNC: 10 U/L (ref 5–45)
BASOPHILS # BLD AUTO: 0.08 THOUSANDS/ΜL (ref 0–0.1)
BASOPHILS NFR BLD AUTO: 0 % (ref 0–1)
BILIRUB SERPL-MCNC: 1 MG/DL (ref 0.2–1)
BUN SERPL-MCNC: 11 MG/DL (ref 5–25)
CALCIUM ALBUM COR SERPL-MCNC: 9.1 MG/DL (ref 8.3–10.1)
CALCIUM SERPL-MCNC: 7.9 MG/DL (ref 8.3–10.1)
CHLORIDE SERPL-SCNC: 100 MMOL/L (ref 100–108)
CO2 SERPL-SCNC: 29 MMOL/L (ref 21–32)
CREAT SERPL-MCNC: 1.04 MG/DL (ref 0.6–1.3)
EOSINOPHIL # BLD AUTO: 0.16 THOUSAND/ΜL (ref 0–0.61)
EOSINOPHIL NFR BLD AUTO: 1 % (ref 0–6)
ERYTHROCYTE [DISTWIDTH] IN BLOOD BY AUTOMATED COUNT: 15.3 % (ref 11.6–15.1)
FOLATE SERPL-MCNC: >20 NG/ML (ref 3.1–17.5)
GFR SERPL CREATININE-BSD FRML MDRD: 69 ML/MIN/1.73SQ M
GLUCOSE SERPL-MCNC: 111 MG/DL (ref 65–140)
GLUCOSE SERPL-MCNC: 112 MG/DL (ref 65–140)
GLUCOSE SERPL-MCNC: 164 MG/DL (ref 65–140)
GLUCOSE SERPL-MCNC: 170 MG/DL (ref 65–140)
GLUCOSE SERPL-MCNC: 184 MG/DL (ref 65–140)
HCT VFR BLD AUTO: 32.6 % (ref 36.5–49.3)
HGB BLD-MCNC: 10 G/DL (ref 12–17)
IMM GRANULOCYTES # BLD AUTO: 0.12 THOUSAND/UL (ref 0–0.2)
IMM GRANULOCYTES NFR BLD AUTO: 1 % (ref 0–2)
LYMPHOCYTES # BLD AUTO: 1.91 THOUSANDS/ΜL (ref 0.6–4.47)
LYMPHOCYTES NFR BLD AUTO: 9 % (ref 14–44)
MCH RBC QN AUTO: 26 PG (ref 26.8–34.3)
MCHC RBC AUTO-ENTMCNC: 30.7 G/DL (ref 31.4–37.4)
MCV RBC AUTO: 85 FL (ref 82–98)
MONOCYTES # BLD AUTO: 1.18 THOUSAND/ΜL (ref 0.17–1.22)
MONOCYTES NFR BLD AUTO: 6 % (ref 4–12)
NEUTROPHILS # BLD AUTO: 17.22 THOUSANDS/ΜL (ref 1.85–7.62)
NEUTS SEG NFR BLD AUTO: 83 % (ref 43–75)
NRBC BLD AUTO-RTO: 0 /100 WBCS
PLATELET # BLD AUTO: 295 THOUSANDS/UL (ref 149–390)
PMV BLD AUTO: 9.7 FL (ref 8.9–12.7)
POTASSIUM SERPL-SCNC: 3.5 MMOL/L (ref 3.5–5.3)
PROCALCITONIN SERPL-MCNC: 7.19 NG/ML
PROT SERPL-MCNC: 5 G/DL (ref 6.4–8.2)
RBC # BLD AUTO: 3.84 MILLION/UL (ref 3.88–5.62)
SODIUM SERPL-SCNC: 137 MMOL/L (ref 136–145)
WBC # BLD AUTO: 20.67 THOUSAND/UL (ref 4.31–10.16)

## 2022-04-20 PROCEDURE — 99232 SBSQ HOSP IP/OBS MODERATE 35: CPT | Performed by: INTERNAL MEDICINE

## 2022-04-20 PROCEDURE — 99233 SBSQ HOSP IP/OBS HIGH 50: CPT | Performed by: INTERNAL MEDICINE

## 2022-04-20 PROCEDURE — 82948 REAGENT STRIP/BLOOD GLUCOSE: CPT

## 2022-04-20 PROCEDURE — 82746 ASSAY OF FOLIC ACID SERUM: CPT | Performed by: INTERNAL MEDICINE

## 2022-04-20 PROCEDURE — 84145 PROCALCITONIN (PCT): CPT | Performed by: INTERNAL MEDICINE

## 2022-04-20 PROCEDURE — 80053 COMPREHEN METABOLIC PANEL: CPT | Performed by: INTERNAL MEDICINE

## 2022-04-20 PROCEDURE — 94760 N-INVAS EAR/PLS OXIMETRY 1: CPT

## 2022-04-20 PROCEDURE — 85025 COMPLETE CBC W/AUTO DIFF WBC: CPT | Performed by: INTERNAL MEDICINE

## 2022-04-20 PROCEDURE — 94640 AIRWAY INHALATION TREATMENT: CPT

## 2022-04-20 PROCEDURE — 92610 EVALUATE SWALLOWING FUNCTION: CPT

## 2022-04-20 RX ORDER — LANOLIN ALCOHOL/MO/W.PET/CERES
3 CREAM (GRAM) TOPICAL
Status: DISCONTINUED | OUTPATIENT
Start: 2022-04-20 | End: 2022-04-22 | Stop reason: HOSPADM

## 2022-04-20 RX ORDER — CLOTRIMAZOLE 10 MG/1
10 LOZENGE ORAL; TOPICAL
Status: DISCONTINUED | OUTPATIENT
Start: 2022-04-20 | End: 2022-04-22 | Stop reason: HOSPADM

## 2022-04-20 RX ADMIN — IPRATROPIUM BROMIDE 0.5 MG: 0.5 SOLUTION RESPIRATORY (INHALATION) at 09:08

## 2022-04-20 RX ADMIN — Medication 1 TABLET: at 08:18

## 2022-04-20 RX ADMIN — Medication 3 MG: at 22:35

## 2022-04-20 RX ADMIN — METOPROLOL SUCCINATE 100 MG: 50 TABLET, EXTENDED RELEASE ORAL at 08:18

## 2022-04-20 RX ADMIN — INSULIN LISPRO 1 UNITS: 100 INJECTION, SOLUTION INTRAVENOUS; SUBCUTANEOUS at 12:22

## 2022-04-20 RX ADMIN — AMPICILLIN SODIUM AND SULBACTAM SODIUM 3 G: 2; 1 INJECTION, POWDER, FOR SOLUTION INTRAMUSCULAR; INTRAVENOUS at 09:39

## 2022-04-20 RX ADMIN — HEPARIN SODIUM 5000 UNITS: 5000 INJECTION INTRAVENOUS; SUBCUTANEOUS at 21:09

## 2022-04-20 RX ADMIN — LEVALBUTEROL HYDROCHLORIDE 0.63 MG: 0.63 SOLUTION RESPIRATORY (INHALATION) at 19:41

## 2022-04-20 RX ADMIN — IPRATROPIUM BROMIDE 0.5 MG: 0.5 SOLUTION RESPIRATORY (INHALATION) at 14:40

## 2022-04-20 RX ADMIN — CLOTRIMAZOLE 10 MG: 10 LOZENGE ORAL at 15:37

## 2022-04-20 RX ADMIN — FUROSEMIDE 20 MG: 20 TABLET ORAL at 08:17

## 2022-04-20 RX ADMIN — INSULIN LISPRO 1 UNITS: 100 INJECTION, SOLUTION INTRAVENOUS; SUBCUTANEOUS at 21:19

## 2022-04-20 RX ADMIN — ATORVASTATIN CALCIUM 40 MG: 40 TABLET, FILM COATED ORAL at 17:39

## 2022-04-20 RX ADMIN — ASPIRIN 81 MG CHEWABLE TABLET 81 MG: 81 TABLET CHEWABLE at 08:17

## 2022-04-20 RX ADMIN — LEVALBUTEROL HYDROCHLORIDE 0.63 MG: 0.63 SOLUTION RESPIRATORY (INHALATION) at 14:40

## 2022-04-20 RX ADMIN — AMPICILLIN SODIUM AND SULBACTAM SODIUM 3 G: 2; 1 INJECTION, POWDER, FOR SOLUTION INTRAMUSCULAR; INTRAVENOUS at 15:38

## 2022-04-20 RX ADMIN — AMPICILLIN SODIUM AND SULBACTAM SODIUM 3 G: 2; 1 INJECTION, POWDER, FOR SOLUTION INTRAMUSCULAR; INTRAVENOUS at 21:15

## 2022-04-20 RX ADMIN — GUAIFENESIN 600 MG: 600 TABLET ORAL at 08:17

## 2022-04-20 RX ADMIN — AMPICILLIN SODIUM AND SULBACTAM SODIUM 3 G: 2; 1 INJECTION, POWDER, FOR SOLUTION INTRAMUSCULAR; INTRAVENOUS at 03:55

## 2022-04-20 RX ADMIN — FUROSEMIDE 20 MG: 20 TABLET ORAL at 17:39

## 2022-04-20 RX ADMIN — Medication 2000 UNITS: at 08:17

## 2022-04-20 RX ADMIN — LEVALBUTEROL HYDROCHLORIDE 0.63 MG: 0.63 SOLUTION RESPIRATORY (INHALATION) at 09:08

## 2022-04-20 RX ADMIN — HEPARIN SODIUM 5000 UNITS: 5000 INJECTION INTRAVENOUS; SUBCUTANEOUS at 05:01

## 2022-04-20 RX ADMIN — HEPARIN SODIUM 5000 UNITS: 5000 INJECTION INTRAVENOUS; SUBCUTANEOUS at 15:37

## 2022-04-20 RX ADMIN — GUAIFENESIN 600 MG: 600 TABLET ORAL at 17:39

## 2022-04-20 RX ADMIN — CLOTRIMAZOLE 10 MG: 10 LOZENGE ORAL at 21:09

## 2022-04-20 RX ADMIN — PANTOPRAZOLE SODIUM 40 MG: 40 TABLET, DELAYED RELEASE ORAL at 08:18

## 2022-04-20 RX ADMIN — IPRATROPIUM BROMIDE 0.5 MG: 0.5 SOLUTION RESPIRATORY (INHALATION) at 19:41

## 2022-04-20 RX ADMIN — INSULIN LISPRO 1 UNITS: 100 INJECTION, SOLUTION INTRAVENOUS; SUBCUTANEOUS at 17:36

## 2022-04-20 RX ADMIN — ACETAMINOPHEN 650 MG: 325 TABLET ORAL at 15:38

## 2022-04-20 NOTE — PLAN OF CARE
Problem: INFECTION - ADULT  Goal: Absence or prevention of progression during hospitalization  Description: INTERVENTIONS:  - Assess and monitor for signs and symptoms of infection  - Monitor lab/diagnostic results  - Monitor all insertion sites, i e  indwelling lines, tubes, and drains  - Monitor endotracheal if appropriate and nasal secretions for changes in amount and color  - [place] appropriate cooling/warming therapies per order  - Administer medications as ordered  - Instruct and encourage patient and family to use good hand hygiene technique  - Identify and instruct in appropriate isolation precautions for identified infection/condition  Outcome: Progressing  Goal: Absence of fever/infection during neutropenic period  Description: INTERVENTIONS:  - Monitor WBC    Outcome: Progressing     Problem: DISCHARGE PLANNING  Goal: Discharge to home or other facility with appropriate resources  Description: INTERVENTIONS:  - Identify barriers to discharge w/patient and caregiver  - Arrange for needed discharge resources and transportation as appropriate  - Identify discharge learning needs (meds, wound care, etc )  - Arrange for interpretive services to assist at discharge as needed  - Refer to Case Management Department for coordinating discharge planning if the patient needs post-hospital services based on physician/advanced practitioner order or complex needs related to functional status, cognitive ability, or social support system  Outcome: Progressing

## 2022-04-20 NOTE — UTILIZATION REVIEW
Initial Clinical Review    Admission: Date/Time/Statement:   Admission Orders (From admission, onward)     Ordered        04/19/22 0310  INPATIENT ADMISSION  Once                      Orders Placed This Encounter   Procedures    INPATIENT ADMISSION     Standing Status:   Standing     Number of Occurrences:   1     Order Specific Question:   Level of Care     Answer:   Med Surg [16]     Order Specific Question:   Estimated length of stay     Answer:   More than 2 Midnights     Order Specific Question:   Certification     Answer:   I certify that inpatient services are medically necessary for this patient for a duration of greater than two midnights  See H&P and MD Progress Notes for additional information about the patient's course of treatment  ED Arrival Information     Expected Arrival Acuity    - 4/19/2022 01:17 Emergent         Means of arrival Escorted by Service Admission type    [facility] Emergency         Arrival complaint    sob/cough        Chief Complaint   Patient presents with    Shortness of Breath     Pt states he's had a cough for 5 days, feels like he cant breathe now  Hx of covid in 2021  denies chest pain but has chest tightness  Pt took NyQuil to help with symptoms before sleep  Initial Presentation: 77 y o  male from home to ED admitted inpatient due to sepsis due to bilateral pneumonia/strep pneumoniae pneumonia  Presented due to dyspnea starting 5 days prior to arrival with cough and chest tightness  On exam tachycardic  Respiratory distress  Lungs rales L>R  Lactic acid 4  NT-proBNP 2575  K 3   D Dimer 1 86  Wbc 22 31  Cta chest showed multilobar pneumonia  In the ED placed on oxygen to 5 liters  Given azithromycin and vancomycin, IVF bolus  Plan is start Zosyn then later switched to unasyn, continue oxygen  Consult Pulmonary         4/19/22 per Pulmonary - patient with acute hypoxic respiratory failure with strep pneumonia and has abnormal ct with enlarging RLL nodule and worsening mediastinal lymphadenopathy  Lymphoma is progressing  Plan is continue Unasyn, add nebs of xopenex and Atrovent for wheezing  Continue mucinex  Wean oxygen as able  Date: 4/20/22    Day 2:  Shortness of breath and cough improving  On exam:  Lungs coarse rhonchi on right, expiratory wheezing  Able to wean oxygen at rest   Continue Unasyn, scheduled nebulizer  4/20/22 per speech - patient with suspected severe pharyngeal dysphagia  With intake of liquids, has cough and drop in sat  For VBS tomorrow  Recommended diet puree/level 1 diet and nectar thick liquids  Crush medications and put in puree        ED Triage Vitals   Temperature Pulse Respirations Blood Pressure SpO2   04/19/22 0122 04/19/22 0122 04/19/22 0122 04/19/22 0122 04/19/22 0122   100 4 °F (38 °C) (!) 114 (!) 24 (!) 174/92 (!) 89 %      Temp Source Heart Rate Source Patient Position - Orthostatic VS BP Location FiO2 (%)   04/19/22 0122 04/19/22 0122 04/19/22 0739 04/19/22 0739 --   Temporal Monitor Lying Left arm       Pain Score       04/19/22 0122       No Pain          Wt Readings from Last 1 Encounters:   04/20/22 75 kg (165 lb 5 5 oz)     Additional Vital Signs:   04/20/22 07:39:11 99 4 °F (37 4 °C) -- 16 140/64 89 -- -- -- -- --   04/20/22 0735 99 4 °F (37 4 °C) -- -- 140/64 -- -- -- -- -- --   04/19/22 21:37:14 99 7 °F (37 6 °C) -- -- 120/49 Abnormal  73 -- -- -- -- --   04/19/22 18:52:53 98 3 °F (36 8 °C) 77 -- -- -- 96 % -- -- -- --   04/19/22 17:08:39 -- 74 -- 121/48 Abnormal  72 96 % -- -- -- --   04/19/22 15:20:06 101 °F (38 3 °C) Abnormal  84 18 107/62 77 96 % -- -- -- --   04/19/22 1515 -- -- -- -- -- -- 28 2 L/min -- --   04/19/22 1130 -- 73 23 Abnormal  116/59 84 96 % 28 2 L/min Nasal cannula Lying   04/19/22 1030 -- 80 20 141/63 91 97 % 28 2 L/min Nasal cannula Lying   04/19/22 0854 -- 81 -- 116/80 -- -- -- -- -- --   04/19/22 0739 99 8 °F (37 7 °C) 79 25 Abnormal  121/59 85 97 % 28 2 L/min Nasal cannula        Pertinent Labs/Diagnostic Test Results:   CTA ED chest PE Study   Final Result by Vicente Gauthier MD (04/19 5496)      No evidence of pulmonary embolus  Diffuse patchy nodular airspace opacities throughout the bilateral lower lobes and to lesser extent right middle and left upper lobes compatible with pneumonia  1 3 cm juxtapleural right lower lobe nodule abutting the inferior aspect of the right major fissure has increased in size since 10/20/2021, concerning for disease progression  Previously noted right middle lobe masslike consolidation has resolved  Increasing mediastinal adenopathy, either reflecting reactive changes or disease progression  Workstation performed: [id number]         XR chest 1 view portable   ED Interpretation by Juan Jose Corrales MD (04/19 4233)   Pulmonary edema vs consolidation, R>L      Final Result by Drake Stephenson MD (04/19 8049)      Continued evidence of bilateral pulmonary airspace disease  Please refer to subsequently performed chest CT for more specific detail                  Workstation performed: [id number]         FL barium swallow video w speech    (Results Pending)     4/19/22 ecg Previous ECG:  Compared to current    Similarity:  No change    Comparison to cardiac monitor: Yes    Interpretation:     Interpretation: abnormal    Quality:     Tracing quality:  Limited by artifact  Rate:     ECG rate:  109    ECG rate assessment: tachycardic    Rhythm:     Rhythm: sinus tachycardia    Ectopy:     Ectopy: none    QRS:     QRS axis:  Left    QRS intervals:   Wide  Conduction:     Conduction: abnormal      Abnormal conduction: non-specific intraventricular conduction delay    ST segments:     ST segments:  Non-specific  T waves:     T waves: non-specific    Results from last 7 days   Lab Units 04/19/22  0153   SARS-COV-2  Negative     Results from last 7 days   Lab Units 04/20/22  0525 04/19/22  0550 04/19/22  0131 04/14/22  1008 WBC Thousand/uL 20 67*  --  22 31*  --    WHITE BLOOD CELL COUNT  x10E3/uL  --   --   --  12 5*   HEMOGLOBIN g/dL 10 0*  --  12 6  --    HEMOGLOBIN  g/dL  --   --   --  11 9*   HEMATOCRIT % 32 6*  --  40 5  --    HEMATOCRIT  %  --   --   --  36 5*   PLATELETS Thousands/uL 295 297 396*  --    PLATELETS  R27F9/ON  --   --   --  308   NEUTROS ABS Thousands/µL 17 22*  --  15 58*  --    NEUTROS ABS   x10E3/uL  --   --   --  8 2*     Results from last 7 days   Lab Units 04/20/22  0525 04/19/22  0131 04/14/22  1008   SODIUM mmol/L 137 137 141   POTASSIUM mmol/L 3 5 3 0* 3 6   CHLORIDE mmol/L 100 98* 99   CO2 mmol/L 29 28 26   ANION GAP mmol/L 8 11  --    BUN mg/dL 11 10 11   CREATININE mg/dL 1 04 1 15 1 07   EGFR ml/min/1 73sq m 69 61 72   CALCIUM mg/dL 7 9* 8 9  --    MAGNESIUM mg/dL  --  1 8  --      Results from last 7 days   Lab Units 04/20/22  0525 04/19/22  0131   AST U/L 10 11   ALT U/L 9* 13   ALK PHOS U/L 106 153*   TOTAL PROTEIN g/dL 5 0* 7 4   ALBUMIN g/dL 2 5* 3 6   TOTAL BILIRUBIN mg/dL 1 00 0 70     Results from last 7 days   Lab Units 04/20/22  0740 04/19/22  2136 04/19/22  1706 04/19/22  1250 04/19/22  0724   POC GLUCOSE mg/dl 112 130 91 171* 118     Results from last 7 days   Lab Units 04/20/22  0525 04/19/22  0131 04/14/22  1008   GLUCOSE RANDOM mg/dL 111 170* 95     Results from last 7 days   Lab Units 04/19/22  0550   HEMOGLOBIN A1C % 5 8*   EAG mg/dl 120     Results from last 7 days   Lab Units 04/19/22  0550 04/19/22  0329 04/19/22  0131   HS TNI 0HR ng/L  --   --  12   HS TNI 2HR ng/L  --  40  --    HSTNI D2 ng/L  --  28*  --    HS TNI 4HR ng/L 53*  --   --    HSTNI D4 ng/L 41*  --   --      Results from last 7 days   Lab Units 04/19/22 0131   D-DIMER QUANTITATIVE ug/ml FEU 1 86*     Results from last 7 days   Lab Units 04/19/22 0131   PROTIME seconds 15 4*   INR  1 24*   PTT seconds 30     Results from last 7 days   Lab Units 04/20/22  0525 04/19/22 0131   PROCALCITONIN ng/ml 7 19* 0 12 Results from last 7 days   Lab Units 04/19/22  0410 04/19/22  0131   LACTIC ACID mmol/L 1 2 4 0*     Results from last 7 days   Lab Units 04/19/22  0131   NT-PRO BNP pg/mL 2,575*     Results from last 7 days   Lab Units 04/19/22  0249 04/19/22  0201 04/19/22  0153   STREP PNEUMONIAE ANTIGEN, URINE  Positive*  --   --    LEGIONELLA URINARY ANTIGEN   --  Negative  --    INFLUENZA A PCR   --   --  Negative   INFLUENZA B PCR   --   --  Negative   RSV PCR   --   --  Negative     Results from last 7 days   Lab Units 04/19/22  0319 04/19/22  0131   BLOOD CULTURE   --  No Growth at 24 hrs     GRAM STAIN RESULT  1+ Epithelial cells per low power field*  2+ Polys*  1+ Gram positive cocci in pairs*  Rare Gram positive rods* Gram positive rods Clostridium/Bacillus-like*       ED Treatment:   Medication Administration from 04/19/2022 0117 to 04/19/2022 1504       Date/Time Order Dose Route Action Comments     04/19/2022 0245 azithromycin (ZITHROMAX) 500 mg in sodium chloride 0 9% 250mL IVPB 500 mg 500 mg Intravenous New Bag      04/19/2022 0401 vancomycin (VANCOCIN) 1500 mg in sodium chloride 0 9% 250 mL IVPB 1,500 mg Intravenous New Bag      04/19/2022 0158 piperacillin-tazobactam (ZOSYN) IVPB 4 5 g 4 5 g Intravenous New Bag      04/19/2022 0209 potassium chloride oral solution 20 mEq 20 mEq Oral Given      04/19/2022 0211 sodium chloride 0 9 % bolus 250 mL 250 mL Intravenous New Bag      04/19/2022 0237 iohexol (OMNIPAQUE) 350 MG/ML injection (SINGLE-DOSE) 90 mL 90 mL Intravenous Given      04/19/2022 0318 nitroglycerin (NITROSTAT) SL tablet 0 4 mg 0 4 mg Sublingual Given      04/19/2022 0854 aspirin chewable tablet 81 mg 81 mg Oral Given      04/19/2022 0911 cholecalciferol (VITAMIN D3) tablet 2,000 Units 2,000 Units Oral Given      04/19/2022 0854 furosemide (LASIX) tablet 20 mg 20 mg Oral Given      04/19/2022 0854 metoprolol succinate (TOPROL-XL) 24 hr tablet 100 mg 100 mg Oral Given      04/19/2022 0854 multivitamin-minerals (CENTRUM) tablet 1 tablet 1 tablet Oral Given      04/19/2022 0854 pantoprazole (PROTONIX) EC tablet 40 mg 40 mg Oral Given      04/19/2022 1430 piperacillin-tazobactam (ZOSYN) IVPB 3 375 g 3 375 g Intravenous New Bag      04/19/2022 0856 piperacillin-tazobactam (ZOSYN) IVPB 3 375 g 3 375 g Intravenous New Bag      04/19/2022 0854 guaiFENesin (MUCINEX) 12 hr tablet 600 mg 600 mg Oral Given      04/19/2022 1430 heparin (porcine) subcutaneous injection 5,000 Units 5,000 Units Subcutaneous Given      04/19/2022 0552 heparin (porcine) subcutaneous injection 5,000 Units 5,000 Units Subcutaneous Given      04/19/2022 1317 insulin lispro (HumaLOG) 100 units/mL subcutaneous injection 1-6 Units 1 Units Subcutaneous Given         Past Medical History:   Diagnosis Date    Angina pectoris ([place] [address] )     Arthritis     Atrial fibrillation (HCC)     Cancer ([place] [address] )     Cataract     Colitis     Colon cancer ([place] [address] )     Colon polyp     Diabetes mellitus ([place] [address] )     Fatty liver     GERD (gastroesophageal reflux disease)     History of chemotherapy     History of radiation therapy     History of shingles 2018    History of transfusion     Hyperlipidemia     Hypertension     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[address] )     Pneumonia     Skin cancer      Present on Admission:   Essential hypertension   Paroxysmal atrial fibrillation (HCC)   Chronic systolic congestive heart failure (HCC)   Dyslipidemia   Type 2 diabetes mellitus without complication, without long-term current use of insulin (HCC)   Coronary artery disease involving native heart with angina pectoris, unspecified vessel or lesion type (HCC)   Lymphoma ([place] [address] )      Admitting Diagnosis: CHF (congestive heart failure) (HCC) [I50 9]  Pneumonia [J18 9]  SOB (shortness of breath) [R06 02]  Hypoxia [R09 02]  Sepsis ([place] [address] ) [A41 9]  Age/Sex: 77 y o  male  Admission Orders: 4/19/22 0310 inpatient   Scheduled Medications:  ampicillin-sulbactam, 3 g, Intravenous, Q6H  aspirin, 81 mg, Oral, Daily  atorvastatin, 40 mg, Oral, After Dinner  cholecalciferol, 2,000 Units, Oral, Daily  furosemide, 20 mg, Oral, BID  guaiFENesin, 600 mg, Oral, BID  heparin (porcine), 5,000 Units, Subcutaneous, Q8H DONNIE  insulin lispro, 1-6 Units, Subcutaneous, TID AC  insulin lispro, 1-6 Units, Subcutaneous, HS  ipratropium, 0 5 mg, Nebulization, TID  levalbuterol, 0 63 mg, Nebulization, TID  metoprolol succinate, 100 mg, Oral, Daily  multivitamin-minerals, 1 tablet, Oral, Daily  pantoprazole, 40 mg, Oral, Daily Before Breakfast    piperacillin-tazobactam (ZOSYN) IVPB 3 375 g  Dose: 3 375 g  Freq: Every 6 hours Route: IV  Last Dose: Stopped (04/19/22 1532)  Start: 04/19/22 0800 End: 04/19/22 1439    Continuous IV Infusions: none      PRN Meds:  acetaminophen, 650 mg, Oral, Q6H PRN - used x 1 4/19/22   ondansetron, 4 mg, Intravenous, Q6H PRN    Aspiration precautions   Fluid restricition     IP CONSULT TO PULMONOLOGY      Network Utilization Review Department  ATTENTION: Please call with any questions or concerns to [phone] and carefully listen to the prompts so that you are directed to the right person  All voicemails are confidential   Rebecca Muss all requests for admission clinical reviews, approved or denied determinations and any other requests to dedicated fax number below belonging to the campus where the patient is receiving treatment   List of dedicated fax numbers for the Facilities:  [address] DENIALS (Administrative/Medical Necessity) [phone]   [address] (Maternity/NICU/Pediatrics) [phone]   [address] [place] [address] [place] [address] [facility] [place] Karolina Yoseph [address] [id number] [facility] Willa Carrizales [address] [phone]

## 2022-04-20 NOTE — ASSESSMENT & PLAN NOTE
Patient met criteria for sepsis admission due to strep pneumo pneumonia    Blood culture shows contaminants    Continue IV Unasyn

## 2022-04-20 NOTE — ASSESSMENT & PLAN NOTE
Wt Readings from Last 3 Encounters:   04/20/22 75 kg (165 lb 5 5 oz)   04/18/22 73 3 kg (161 lb 9 6 oz)   04/07/22 73 5 kg (162 lb)     Patient has chronic systolic CHF with ejection fraction 40%      He has no evidence of acute systolic CHF    Continue Toprol and Lasix

## 2022-04-20 NOTE — PROGRESS NOTES
Progress Note - Pulmonary   Michaelle Ruano 77 y o  male MRN: [id number]  Unit/Bed#: -01 Encounter: [id number]    Assessment & Plan:  Acute hypoxic respiratory failure  Sepsis, POA  Streptococcal pneumonia  Abnormal CT chest with increasing size of right lower lobe nodule and mediastinal adenopathy  History of lymphoma, multiple myeloma, colon cancer, and melanoma  History of severe COVID-19 infection 5/21    · Patient was initially requiring 5 L by nasal cannula at this time saturating well on room air at rest   Will need ambulatory pulse ox monitoring prior to discharge  · Continue Unasyn  Patient will need at least 7 days total of antibiotics  · Sputum culture pending  · Continue scheduled nebs  · Will need close follow-up as an outpatient with oncology and ENT to have his lymph node biopsy rescheduled    Subjective:   Shortness of breath and cough both improving  Feels much better today  Objective:     Vitals: Blood pressure 140/64, pulse 77, temperature 99 4 °F (37 4 °C), resp  rate 16, height 5' 11" (1 803 m), weight 75 kg (165 lb 5 5 oz), SpO2 96 %  ,Body mass index is 23 06 kg/m²  Intake/Output Summary (Last 24 hours) at 4/20/2022 1038  Last data filed at 4/20/2022 5416  Gross per 24 hour   Intake 300 ml   Output 975 ml   Net -675 ml       Invasive Devices  Report    Peripheral Intravenous Line            Peripheral IV 04/19/22 Left Forearm 1 day    Peripheral IV 04/19/22 Right Antecubital 1 day                Physical Exam:  General appearance: Alert and oriented, in no acute distress  Head: Normocephalic, without obvious abnormality, atraumatic  Eyes: EOMI  No discharge bilaterally  No scleral icterus  Neck: Supple, symmetrical, trachea midline  Lungs:  Coarse rhonchi on the right    Some expiratory wheezing is noted  Heart: Regular rate and rhythm, S1, S2 normal, no murmur  Abdomen:  No appreciable distension or tenderness  Extremities:  No edema or tenderness  Skin: Warm and dry  Neurologic: No acute focal deficits are noted      Labs: I have personally reviewed pertinent lab results  Imaging and other studies: I have personally reviewed pertinent reports

## 2022-04-20 NOTE — ASSESSMENT & PLAN NOTE
Patient has dysphagia    Speech therapy recommended dysphagia diet and patient will have video barium swallow tomorrow

## 2022-04-20 NOTE — ASSESSMENT & PLAN NOTE
Lab Results   Component Value Date    HGBA1C 5 8 (H) 04/19/2022       Recent Labs     04/19/22  1706 04/19/22  2136 04/20/22  0740 04/20/22  1105   POCGLU 91 130 112 184*       Blood Sugar Average: Last 72 hrs:  · (P) 503 [id number]     Patient has diabetes without complication    A1c is well controlled  Continue sliding scale insulin

## 2022-04-20 NOTE — SPEECH THERAPY NOTE
Speech Language/Pathology    Speech-Language Pathology Bedside Swallow Evaluation      Patient Name: Regina Prabhakar    Today's Date: 4/20/2022     Problem List  Principal Problem:    Pneumonia due to infectious organism  Active Problems:    Coronary artery disease involving native heart with angina pectoris, unspecified vessel or lesion type ([facility] )    Type 2 diabetes mellitus without complication, without long-term current use of insulin (HCC)    Dyslipidemia    Lymphoma (HCC)    Essential hypertension    Hypoxia    Chronic systolic congestive heart failure (HCC)    Paroxysmal atrial fibrillation ([address] )    Sepsis ([address] )    Chest pain    Red tongue      Past Medical History  Past Medical History:   Diagnosis Date    Angina pectoris ([address] )     Arthritis     Atrial fibrillation ([facility]     Cancer ([facility] )     Cataract     Colitis     Colon cancer ([facility] )     Colon polyp     Diabetes mellitus ([facility] )     Fatty liver     GERD (gastroesophageal reflux disease)     History of chemotherapy     History of radiation therapy     History of shingles 2018    History of transfusion     Hyperlipidemia     Hypertension     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[facility] )     Pneumonia     Skin cancer        Past Surgical History  Past Surgical History:   Procedure Laterality Date    AORTIC VALVE REPLACEMENT      CATARACT EXTRACTION Bilateral     COLECTOMY      COLONOSCOPY  11/2019    Prior right hemicolectomy    CORONARY ARTERY BYPASS GRAFT      DENTAL SURGERY      KNEE SURGERY      LYMPHADENECTOMY      OTHER SURGICAL HISTORY      MAZE PROCEDURE    [place] TOTAL KNEE ARTHROPLASTY Left 1/28/2019    Procedure: ARTHROPLASTY LEFT KNEE TOTAL;  Surgeon: Angeles Mckeon MD;  Location:  MAIN OR;  Service: Orthopedics    SKIN BIOPSY      UPPER GASTROINTESTINAL ENDOSCOPY  11/2019    Schatzki ring    US GUIDED LYMPH NODE BIOPSY RIGHT  12/14/2021       Summary   Pt presented with s/s suggestive suspected severe pharyngeal dysphagia   Pt w/ c/o globus sensation and effortful swallows w/ all material  Swallows suspected fairly prompt, hyo-laryngeal rise fair to palpation  Cough and drop in SPO2 w/ several trials of soft solids and thin liquids today  Pt aware of difficulty stating "there it goes choking again " No overt clinical s/s aspiration w/ puree or nectar thick today  Pt recalls previous VBS, agreeable to repeat study as he feels it has worsened  VBS scheduled for earliest availability, tomorrow am  Pt agreeable to dysphagia diet modifications until VBS, reports improved tolerance w/ trials of modifications  Risk/s for Aspiration: Mild     Recommended Diet: puree/level 1 diet and nectar thick liquids   Recommended Form of Meds: crushed with puree   Aspiration precautions and swallowing strategies: upright posture, only feed when fully alert, slow rate of feeding and small bites/sips  Other Recommendations: Continue frequent oral care, VBS         Current Medical Status  Pt is a 77 y o  male who presented to [address] [place] [address] with     Current Precautions:  Fall  Aspiration  Contact  AIrborn  C diff   Seizure  Delirium    Allergies:  No known food allergies    Past medical history:  Please see H&P for details    Previous VBS:   11/27/18:  Summary: The patient presented with a mild pharyngeal dysphagia characterized by inconsistent epiglottic inversion, with decreased base of tongue movement  This resulted in valleculae retention with regular solids, and barium tablet          Recommendations:  Diet: Mechanical soft   Liquids:  Thin liquids              Meds: CRUSHED in puree         Special Studies:  CTA chest PE study 4/19:   No evidence of pulmonary embolus      Diffuse patchy nodular airspace opacities throughout the bilateral lower lobes and to lesser extent right middle and left upper lobes compatible with pneumonia      1 3 cm juxtapleural right lower lobe nodule abutting the inferior aspect of the right major fissure has increased in size since 10/20/2021, concerning for disease progression  Previously noted right middle lobe masslike consolidation has resolved      Increasing mediastinal adenopathy, either reflecting reactive changes or disease progression  CXR 4/19: Continued evidence of bilateral pulmonary airspace disease  Please refer to subsequently performed chest CT for more specific detail      Social/Education/Vocational Hx:  Pt lives w/ spouse    Swallow Information   Current Risks for Dysphagia & Aspiration: known history of dysphagia and h/o radiation   Current Symptoms/Concerns: coughing with po and current pna  Current Diet: regular diet and thin liquids   Baseline Diet: regular diet, thin liquids and [facility] after VBS in 2018 w/ ST f/u, pt reports he never pursued OP ST      Baseline Assessment   Behavior/Cognition: alert  Speech/Language Status: able to participate in conversation and able to follow commands  Patient Positioning: upright in bed  Pain Status/Interventions/Response to Interventions:   No report of or nonverbal indications of pain  Swallow Mechanism Exam  Facial: symmetrical  Labial: WFL  Lingual: WFL  Velum: symmetrical  Mandible: adequate ROM  Dentition: adequate  Vocal quality:clear/adequate   Volitional Cough: strong/productive   Respiratory Status: on RA      Consistencies Assessed and Performance   Consistencies Administered: thin liquids, nectar thick, puree and soft solids, pt declined hard solids     Oral Stage: WFL for materials administered today  Mastication was adequate with the materials administered today  Bolus formation and transfer were functional with no significant oral residue noted  No overt s/s reduced oral control  Pharyngeal Stage: suspect mod-severe  Swallow Mechanics:  Swallowing initiation appeared prompt  Laryngeal rise was palpated and judged to be fair  Cough and change in respiratory status w/ several trials soft solids  Pt endorses sensation of choking  Esophageal Concerns: globus sensation    Strategies and Efficacy: small single sips, liquid wash not effective     Summary and Recommendations (see above)    Results Reviewed with: patient, RN and MD     Treatment Recommended: Yes     Frequency of treatment: As able     Patient Stated Goal: none stated    Dysphagia LTG  -Patient will demonstrate safe and effective oral intake (without overt s/s significant oral/pharyngeal dysphagia including s/s penetration or aspiration) for the highest appropriate diet level       Short Term Goals:  -Pt will tolerate Dysphagia 1/pureed diet and nectar thick liquid with no significant s/s oral or pharyngeal dysphagia across 1-3 diagnostic session/s    -Patient will tolerate trials of upgraded food and/or liquid texture with no significant s/s of oral or pharyngeal dysphagia including aspiration across 1-3 diagnostic sessions     -Patient will comply with a Video/Modified Barium Swallow study for more complete assessment of swallowing anatomy/physiology/aspiration risk and to assess efficacy of treatment techniques so as to best guide treatment plan      Speech Therapy Prognosis   Prognosis: fair    Prognosis Considerations: age, medical status, prior medical history and VBS findings

## 2022-04-20 NOTE — ASSESSMENT & PLAN NOTE
Patient complains of a burning feeling in the tongue ever since his code infection last year  Examination patient has erythematous, shiny tongue      Will check his iron, R94, folic acid level    Will try mycelex troches

## 2022-04-20 NOTE — ASSESSMENT & PLAN NOTE
Patient acute hypoxic respiratory failure on admission as evidenced by respiratory more than 24, oxygen saturation of 89% due to bilateral strep pneumo pneumonia  He was treated with oxygen via nasal cannula  Acute hypoxic respiratory failure resolved  Patient is on room air resting comfortably with oxygen saturation of 96%

## 2022-04-20 NOTE — PROGRESS NOTES
[place]  Progress Note - Daria Brush 1945, 77 y o  male MRN: [id number]  Unit/Bed#: -01 Encounter: [id number]  Primary Care Provider: Sada Aguilar DO   Date and time admitted to hospital: 4/19/2022  1:20 AM    * Pneumonia due to Streptococcus pneumoniae [facility])  Assessment & Plan  Patient presented with bilateral pneumonia due to strep pneumonia  He is responding to IV Unasyn  Will continue Unasyn 3 g IV q 6 hours  Discussed case with patient's wife at the bedside in detail on April 20th    Sepsis [facility])  Assessment & Plan  Patient met criteria for sepsis admission due to strep pneumo pneumonia  Blood culture shows contaminants    Continue IV Unasyn    Acute respiratory failure with hypoxia [facility])  Assessment & Plan  Patient acute hypoxic respiratory failure on admission as evidenced by respiratory more than 24, oxygen saturation of 89% due to bilateral strep pneumo pneumonia  He was treated with oxygen via nasal cannula  Acute hypoxic respiratory failure resolved  Patient is on room air resting comfortably with oxygen saturation of 96%  Chronic systolic congestive heart failure [facility])  Assessment & Plan  Wt Readings from Last 3 Encounters:   04/20/22 75 kg (165 lb 5 5 oz)   04/18/22 73 3 kg (161 lb 9 6 oz)   04/07/22 73 5 kg (162 lb)     Patient has chronic systolic CHF with ejection fraction 40%  He has no evidence of acute systolic CHF    Continue Toprol and Lasix            Type 2 diabetes mellitus without complication, without long-term current use of insulin [facility])  Assessment & Plan  Lab Results   Component Value Date    HGBA1C 5 8 (H) 04/19/2022       Recent Labs     04/19/22  1706 04/19/22  2136 04/20/22  0740 04/20/22  1105   POCGLU 91 130 112 184*       Blood Sugar Average: Last 72 hrs:  · (P) 391 [id number]     Patient has diabetes without complication    A1c is well controlled  Continue sliding scale insulin    Oropharyngeal dysphagia  Assessment & Plan  Patient has dysphagia  Speech therapy recommended dysphagia diet and patient will have video barium swallow tomorrow    Glossitis  Assessment & Plan  Patient complains of a burning feeling in the tongue ever since his code infection last year  Examination patient has erythematous, shiny tongue  Will check his iron, O64, folic acid level    Will try mycelex troches      VTE Prophylaxis: in place    Patient Centered Rounds: I rounded with patient's nurse    Current Length of Stay: 1 day(s)    Current Patient Status: Inpatient    Certification Statement: Pt requires additional inpatient hospital stay due to: see assessment and plan        Subjective:   Patient's shortness of breath is markedly improved by today and he is on room air  Still has a cough with phlegm production  Denies chest pain, pleurisy, nausea abdominal pain, diarrhea  Complains of a painful and burning sensation the tongue since the code infection last year    All other ROS are negative    Objective:     Vitals:   Temp (24hrs), Av 6 °F (37 6 °C), Min:98 3 °F (36 8 °C), Max:101 °F (38 3 °C)    Temp:  [98 3 °F (36 8 °C)-101 °F (38 3 °C)] 99 4 °F (37 4 °C)  HR:  [74-84] 77  Resp:  [16-18] 16  BP: (107-140)/(48-64) 140/64  SpO2:  [93 %-96 %] 93 %  Body mass index is 23 06 kg/m²  Input and Output Summary (last 24 hours): Intake/Output Summary (Last 24 hours) at 2022 1447  Last data filed at 2022 1121  Gross per 24 hour   Intake 300 ml   Output 745 ml   Net -445 ml       Physical Exam:     Physical Exam  HENT:      Head: Normocephalic  Mouth/Throat:      Comments: The tongue is shiny, erythematous I saw no aphthous ulcers, masses  Eyes:      Conjunctiva/sclera: Conjunctivae normal    Cardiovascular:      Rate and Rhythm: Normal rate and regular rhythm  Pulmonary:      Effort: No respiratory distress        Breath sounds: Rales (Bilateral inspiratory crackles are present as well as rhonchi) present  No wheezing  Abdominal:      General: Bowel sounds are normal       Palpations: Abdomen is soft  Tenderness: There is no abdominal tenderness  Musculoskeletal:      Cervical back: Neck supple  Lymphadenopathy:      Cervical: No cervical adenopathy  Skin:     General: Skin is warm and dry  Comments: Patient has no lower extremity edema   Neurological:      Mental Status: He is alert and oriented to person, place, and time  Mental status is at baseline  Cranial Nerves: No cranial nerve deficit  Motor: No weakness  Psychiatric:         Mood and Affect: Mood normal              I personally reviewed labs and imaging reports for today        Last 24 Hours Medication List:   Current Facility-Administered Medications   Medication Dose Route Frequency Provider Last Rate    acetaminophen  650 mg Oral Q6H PRN Becky Parent, PA-C      ampicillin-sulbactam  3 g Intravenous Q6H Mark Arias MD      aspirin  81 mg Oral Daily Becky Parent, PA-C      atorvastatin  40 mg Oral After Dinner Becky Parent, PA-C      cholecalciferol  2,000 Units Oral Daily Becky Parent, PA-C      clotrimazole  10 mg Oral 5x Daily Mark Arias MD      furosemide  20 mg Oral BID Becky Parent, PA-C      guaiFENesin  600 mg Oral BID Becky Parent, PA-C      heparin (porcine)  5,000 Units Subcutaneous [facility] Becky Parent, PA-C      insulin lispro  1-6 Units Subcutaneous TID AC Karen Monte, PA-C      insulin lispro  1-6 Units Subcutaneous HS Becky Parent, PA-C      ipratropium  0 5 mg Nebulization TID Alston Holland, DO      levalbuterol  0 63 mg Nebulization TID Alston Holland, DO      metoprolol succinate  100 mg Oral Daily Becky Parent, PA-C      multivitamin-minerals  1 tablet Oral Daily Becky Parent, PA-C      ondansetron  4 mg Intravenous Q6H PRN Becky Parent, PA-C      pantoprazole  40 mg Oral Daily Before Breakfast Becky Praveen, PA-C            Today, Patient Was Seen By: Mark Arias MD    ** Please Note: Dictation voice to text software may have been used in the creation of this document   **

## 2022-04-20 NOTE — PHYSICAL THERAPY NOTE
Physical Therapy Screen    Patient Name: Pualina Deng    Today's Date: 4/20/2022     Problem List  Principal Problem:    Pneumonia due to infectious organism  Active Problems:    Coronary artery disease involving native heart with angina pectoris, unspecified vessel or lesion type ([facility] )    Type 2 diabetes mellitus without complication, without long-term current use of insulin (HCC)    Dyslipidemia    Lymphoma (HCC)    Essential hypertension    Hypoxia    Chronic systolic congestive heart failure (HCC)    Paroxysmal atrial fibrillation ([facility] )    Sepsis ([facility] )    Chest pain    Red tongue       Past Medical History  Past Medical History:   Diagnosis Date    Angina pectoris ([facility] )     Arthritis     Atrial fibrillation ([facility] )     Cancer ([facility] )     Cataract     Colitis     [facility]     Colon polyp     Diabetes mellitus 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     Pneumonia     Skin cancer         Past Surgical History  Past Surgical History:   Procedure Laterality Date    AORTIC VALVE REPLACEMENT      CATARACT EXTRACTION Bilateral     COLECTOMY      COLONOSCOPY  11/2019    Prior right hemicolectomy    CORONARY ARTERY BYPASS GRAFT      DENTAL SURGERY      KNEE SURGERY      LYMPHADENECTOMY      OTHER SURGICAL HISTORY      MAZE PROCEDURE    [place] TOTAL KNEE ARTHROPLASTY Left 1/28/2019    Procedure: ARTHROPLASTY LEFT KNEE TOTAL;  Surgeon: Juan Swift MD;  Location:  MAIN OR;  Service: Orthopedics    SKIN BIOPSY      UPPER GASTROINTESTINAL ENDOSCOPY  11/2019    Schatzki ring    US GUIDED LYMPH NODE BIOPSY RIGHT  12/14/2021 04/20/22 1304   PT Last Visit   PT Visit Date 04/20/22   Note Type   Note type Screen   Additional Comments Chart review completed  Spoke with RN who reports that patient has been independently ambulting in the room and has no inpatient P T  needs  Will discharge P T  Kristen Taylor, PT

## 2022-04-20 NOTE — OCCUPATIONAL THERAPY NOTE
Occupational Therapy Screen Note     Patient Name: Cholo Merrill  Today's Date: 4/20/2022  Problem List  Principal Problem:    Pneumonia due to infectious organism  Active Problems:    Coronary artery disease involving native heart with angina pectoris, unspecified vessel or lesion type ([facility] )    Type 2 diabetes mellitus without complication, without long-term current use of insulin (HCC)    Dyslipidemia    Lymphoma (HCC)    Essential hypertension    Hypoxia    Chronic systolic congestive heart failure (HCC)    Paroxysmal atrial fibrillation ([facility] )    Sepsis ([facility] )    Chest pain    Red tongue            04/20/22 1324   OT Last Visit   OT Visit Date 04/20/22   Note Type   Note type Screen   Additional Comments OT orders received, chart reviewed  Per PT who spoke with RN, pt is mobilizing around room with no acute OT needs at this time  Will D/C OT orders  Please re-consult if any medical changes arise       MAK He/MARLA

## 2022-04-20 NOTE — ASSESSMENT & PLAN NOTE
Patient presented with bilateral pneumonia due to strep pneumonia  He is responding to IV Unasyn  Will continue Unasyn 3 g IV q 6 hours      Discussed case with patient's wife at the bedside in detail on April 20th

## 2022-04-20 NOTE — PLAN OF CARE
Problem: PAIN - ADULT  Goal: Verbalizes/displays adequate comfort level or baseline comfort level  Description: Interventions:  - Encourage patient to monitor pain and request assistance  - Assess pain using appropriate pain scale  - Administer analgesics based on type and severity of pain and evaluate response  - Implement non-pharmacological measures as appropriate and evaluate response  - Consider cultural and social influences on pain and pain management  - Notify physician/advanced practitioner if interventions unsuccessful or patient reports new pain  Outcome: Progressing     Problem: INFECTION - ADULT  Goal: Absence or prevention of progression during hospitalization  Description: INTERVENTIONS:  - Assess and monitor for signs and symptoms of infection  - Monitor lab/diagnostic results  - Monitor all insertion sites, i e  indwelling lines, tubes, and drains  - Monitor endotracheal if appropriate and nasal secretions for changes in amount and color  - [place] appropriate cooling/warming therapies per order  - Administer medications as ordered  - Instruct and encourage patient and family to use good hand hygiene technique  - Identify and instruct in appropriate isolation precautions for identified infection/condition  Outcome: Progressing  Goal: Absence of fever/infection during neutropenic period  Description: INTERVENTIONS:  - Monitor WBC    Outcome: Progressing     Problem: SAFETY ADULT  Goal: Patient will remain free of falls  Description: INTERVENTIONS:  - Educate patient/family on patient safety including physical limitations  - Instruct patient to call for assistance with activity   - Consult OT/PT to assist with strengthening/mobility   - Keep Call bell within reach  - Keep bed low and locked with side rails adjusted as appropriate  - Keep care items and personal belongings within reach  - Initiate and maintain comfort rounds  - Make Fall Risk Sign visible to staff  - Offer Toileting every 2 Hours, in advance of need  - Initiate/Maintain alarm  - Obtain necessary fall risk management equipment  - Apply yellow socks and bracelet for high fall risk patients  - Consider moving patient to room near nurses station  Outcome: Progressing  Goal: Maintain or return to baseline ADL function  Description: INTERVENTIONS:  -  Assess patient's ability to carry out ADLs; assess patient's baseline for ADL function and identify physical deficits which impact ability to perform ADLs (bathing, care of mouth/teeth, toileting, grooming, dressing, etc )  - Assess/evaluate cause of self-care deficits   - Assess range of motion  - Assess patient's mobility; develop plan if impaired  - Assess patient's need for assistive devices and provide as appropriate  - Encourage maximum independence but intervene and supervise when necessary  - Involve family in performance of ADLs  - Assess for home care needs following discharge   - Consider OT consult to assist with ADL evaluation and planning for discharge  - Provide patient education as appropriate  Outcome: Progressing  Goal: Maintains/Returns to pre admission functional level  Description: INTERVENTIONS:  - Perform BMAT or MOVE assessment daily    - Set and communicate daily mobility goal to care team and patient/family/caregiver  - Collaborate with rehabilitation services on mobility goals if consulted  - Perform Range of Motion 4 times a day  - Reposition patient every 2 hours    - Dangle patient 4 times a day  - Stand patient 4 times a day  - Ambulate patient 4 times a day  - Out of bed to chair 4 times a day   - Out of bed for meals 3 times a day  - Out of bed for toileting  - Record patient progress and toleration of activity level   Outcome: Progressing     Problem: DISCHARGE PLANNING  Goal: Discharge to home or other facility with appropriate resources  Description: INTERVENTIONS:  - Identify barriers to discharge w/patient and caregiver  - Arrange for needed discharge resources and transportation as appropriate  - Identify discharge learning needs (meds, wound care, etc )  - Arrange for interpretive services to assist at discharge as needed  - Refer to Case Management Department for coordinating discharge planning if the patient needs post-hospital services based on physician/advanced practitioner order or complex needs related to functional status, cognitive ability, or social support system  Outcome: Progressing     Problem: Knowledge Deficit  Goal: Patient/family/caregiver demonstrates understanding of disease process, treatment plan, medications, and discharge instructions  Description: Complete learning assessment and assess knowledge base    Interventions:  - Provide teaching at level of understanding  - Provide teaching via preferred learning methods  Outcome: Progressing     Problem: RESPIRATORY - ADULT  Goal: Achieves optimal ventilation and oxygenation  Description: INTERVENTIONS:  - Assess for changes in respiratory status  - Assess for changes in mentation and behavior  - Position to facilitate oxygenation and minimize respiratory effort  - Oxygen administered by appropriate delivery if ordered  - Initiate smoking cessation education as indicated  - Encourage broncho-pulmonary hygiene including cough, deep breathe, Incentive Spirometry  - Assess the need for suctioning and aspirate as needed  - Assess and instruct to report SOB or any respiratory difficulty  - Respiratory Therapy support as indicated  Outcome: Progressing

## 2022-04-21 ENCOUNTER — APPOINTMENT (INPATIENT)
Dept: RADIOLOGY | Facility: HOSPITAL | Age: 77
DRG: 871 | End: 2022-04-21
Payer: COMMERCIAL

## 2022-04-21 PROBLEM — R13.13 PHARYNGEAL DYSPHAGIA: Status: ACTIVE | Noted: 2019-10-11

## 2022-04-21 LAB
ANION GAP SERPL CALCULATED.3IONS-SCNC: 8 MMOL/L (ref 4–13)
BASOPHILS # BLD AUTO: 0.07 THOUSANDS/ΜL (ref 0–0.1)
BASOPHILS NFR BLD AUTO: 0 % (ref 0–1)
BUN SERPL-MCNC: 10 MG/DL (ref 5–25)
CALCIUM SERPL-MCNC: 8.7 MG/DL (ref 8.3–10.1)
CHLORIDE SERPL-SCNC: 100 MMOL/L (ref 100–108)
CO2 SERPL-SCNC: 29 MMOL/L (ref 21–32)
CREAT SERPL-MCNC: 0.94 MG/DL (ref 0.6–1.3)
EOSINOPHIL # BLD AUTO: 0.21 THOUSAND/ΜL (ref 0–0.61)
EOSINOPHIL NFR BLD AUTO: 1 % (ref 0–6)
ERYTHROCYTE [DISTWIDTH] IN BLOOD BY AUTOMATED COUNT: 15.1 % (ref 11.6–15.1)
GFR SERPL CREATININE-BSD FRML MDRD: 78 ML/MIN/1.73SQ M
GLUCOSE SERPL-MCNC: 134 MG/DL (ref 65–140)
GLUCOSE SERPL-MCNC: 138 MG/DL (ref 65–140)
GLUCOSE SERPL-MCNC: 169 MG/DL (ref 65–140)
GLUCOSE SERPL-MCNC: 170 MG/DL (ref 65–140)
GLUCOSE SERPL-MCNC: 217 MG/DL (ref 65–140)
HCT VFR BLD AUTO: 34 % (ref 36.5–49.3)
HGB BLD-MCNC: 10.5 G/DL (ref 12–17)
IMM GRANULOCYTES # BLD AUTO: 0.16 THOUSAND/UL (ref 0–0.2)
IMM GRANULOCYTES NFR BLD AUTO: 1 % (ref 0–2)
IRON SATN MFR SERPL: 9 % (ref 20–50)
IRON SERPL-MCNC: 18 UG/DL (ref 65–175)
LYMPHOCYTES # BLD AUTO: 1.96 THOUSANDS/ΜL (ref 0.6–4.47)
LYMPHOCYTES NFR BLD AUTO: 12 % (ref 14–44)
MCH RBC QN AUTO: 25.9 PG (ref 26.8–34.3)
MCHC RBC AUTO-ENTMCNC: 30.9 G/DL (ref 31.4–37.4)
MCV RBC AUTO: 84 FL (ref 82–98)
MONOCYTES # BLD AUTO: 0.92 THOUSAND/ΜL (ref 0.17–1.22)
MONOCYTES NFR BLD AUTO: 6 % (ref 4–12)
NEUTROPHILS # BLD AUTO: 13.54 THOUSANDS/ΜL (ref 1.85–7.62)
NEUTS SEG NFR BLD AUTO: 80 % (ref 43–75)
NRBC BLD AUTO-RTO: 0 /100 WBCS
PLATELET # BLD AUTO: 343 THOUSANDS/UL (ref 149–390)
PMV BLD AUTO: 9 FL (ref 8.9–12.7)
POTASSIUM SERPL-SCNC: 3.4 MMOL/L (ref 3.5–5.3)
PROCALCITONIN SERPL-MCNC: 2.81 NG/ML
RBC # BLD AUTO: 4.06 MILLION/UL (ref 3.88–5.62)
SODIUM SERPL-SCNC: 137 MMOL/L (ref 136–145)
TIBC SERPL-MCNC: 207 UG/DL (ref 250–450)
VIT B12 SERPL-MCNC: 1361 PG/ML (ref 100–900)
WBC # BLD AUTO: 16.86 THOUSAND/UL (ref 4.31–10.16)

## 2022-04-21 PROCEDURE — 99232 SBSQ HOSP IP/OBS MODERATE 35: CPT | Performed by: INTERNAL MEDICINE

## 2022-04-21 PROCEDURE — 83550 IRON BINDING TEST: CPT | Performed by: INTERNAL MEDICINE

## 2022-04-21 PROCEDURE — 84145 PROCALCITONIN (PCT): CPT | Performed by: PHYSICIAN ASSISTANT

## 2022-04-21 PROCEDURE — 92611 MOTION FLUOROSCOPY/SWALLOW: CPT

## 2022-04-21 PROCEDURE — 94760 N-INVAS EAR/PLS OXIMETRY 1: CPT

## 2022-04-21 PROCEDURE — 82607 VITAMIN B-12: CPT | Performed by: INTERNAL MEDICINE

## 2022-04-21 PROCEDURE — 94668 MNPJ CHEST WALL SBSQ: CPT

## 2022-04-21 PROCEDURE — 94640 AIRWAY INHALATION TREATMENT: CPT

## 2022-04-21 PROCEDURE — 82948 REAGENT STRIP/BLOOD GLUCOSE: CPT

## 2022-04-21 PROCEDURE — 74230 X-RAY XM SWLNG FUNCJ C+: CPT

## 2022-04-21 PROCEDURE — 83540 ASSAY OF IRON: CPT | Performed by: INTERNAL MEDICINE

## 2022-04-21 PROCEDURE — 80048 BASIC METABOLIC PNL TOTAL CA: CPT | Performed by: INTERNAL MEDICINE

## 2022-04-21 PROCEDURE — 85025 COMPLETE CBC W/AUTO DIFF WBC: CPT | Performed by: INTERNAL MEDICINE

## 2022-04-21 RX ADMIN — AMPICILLIN SODIUM AND SULBACTAM SODIUM 3 G: 2; 1 INJECTION, POWDER, FOR SOLUTION INTRAMUSCULAR; INTRAVENOUS at 15:45

## 2022-04-21 RX ADMIN — Medication 2000 UNITS: at 08:48

## 2022-04-21 RX ADMIN — AMPICILLIN SODIUM AND SULBACTAM SODIUM 3 G: 2; 1 INJECTION, POWDER, FOR SOLUTION INTRAMUSCULAR; INTRAVENOUS at 03:15

## 2022-04-21 RX ADMIN — Medication 1 TABLET: at 08:48

## 2022-04-21 RX ADMIN — LEVALBUTEROL HYDROCHLORIDE 0.63 MG: 0.63 SOLUTION RESPIRATORY (INHALATION) at 19:40

## 2022-04-21 RX ADMIN — CLOTRIMAZOLE 10 MG: 10 LOZENGE ORAL at 15:44

## 2022-04-21 RX ADMIN — HEPARIN SODIUM 5000 UNITS: 5000 INJECTION INTRAVENOUS; SUBCUTANEOUS at 21:24

## 2022-04-21 RX ADMIN — PANTOPRAZOLE SODIUM 40 MG: 40 TABLET, DELAYED RELEASE ORAL at 08:48

## 2022-04-21 RX ADMIN — ACETAMINOPHEN 650 MG: 325 TABLET ORAL at 12:19

## 2022-04-21 RX ADMIN — CLOTRIMAZOLE 10 MG: 10 LOZENGE ORAL at 06:14

## 2022-04-21 RX ADMIN — GUAIFENESIN 600 MG: 600 TABLET ORAL at 08:47

## 2022-04-21 RX ADMIN — AMPICILLIN SODIUM AND SULBACTAM SODIUM 3 G: 2; 1 INJECTION, POWDER, FOR SOLUTION INTRAMUSCULAR; INTRAVENOUS at 21:20

## 2022-04-21 RX ADMIN — ASPIRIN 81 MG CHEWABLE TABLET 81 MG: 81 TABLET CHEWABLE at 08:47

## 2022-04-21 RX ADMIN — IPRATROPIUM BROMIDE 0.5 MG: 0.5 SOLUTION RESPIRATORY (INHALATION) at 19:40

## 2022-04-21 RX ADMIN — FUROSEMIDE 20 MG: 20 TABLET ORAL at 08:48

## 2022-04-21 RX ADMIN — METOPROLOL SUCCINATE 100 MG: 50 TABLET, EXTENDED RELEASE ORAL at 08:47

## 2022-04-21 RX ADMIN — CLOTRIMAZOLE 10 MG: 10 LOZENGE ORAL at 12:18

## 2022-04-21 RX ADMIN — AMPICILLIN SODIUM AND SULBACTAM SODIUM 3 G: 2; 1 INJECTION, POWDER, FOR SOLUTION INTRAMUSCULAR; INTRAVENOUS at 10:23

## 2022-04-21 RX ADMIN — HEPARIN SODIUM 5000 UNITS: 5000 INJECTION INTRAVENOUS; SUBCUTANEOUS at 06:14

## 2022-04-21 RX ADMIN — FUROSEMIDE 20 MG: 20 TABLET ORAL at 17:19

## 2022-04-21 RX ADMIN — GUAIFENESIN 600 MG: 600 TABLET ORAL at 17:19

## 2022-04-21 RX ADMIN — HEPARIN SODIUM 5000 UNITS: 5000 INJECTION INTRAVENOUS; SUBCUTANEOUS at 15:55

## 2022-04-21 RX ADMIN — IPRATROPIUM BROMIDE 0.5 MG: 0.5 SOLUTION RESPIRATORY (INHALATION) at 07:12

## 2022-04-21 RX ADMIN — LEVALBUTEROL HYDROCHLORIDE 0.63 MG: 0.63 SOLUTION RESPIRATORY (INHALATION) at 07:12

## 2022-04-21 RX ADMIN — INSULIN LISPRO 1 UNITS: 100 INJECTION, SOLUTION INTRAVENOUS; SUBCUTANEOUS at 08:48

## 2022-04-21 RX ADMIN — INSULIN LISPRO 2 UNITS: 100 INJECTION, SOLUTION INTRAVENOUS; SUBCUTANEOUS at 12:18

## 2022-04-21 RX ADMIN — ATORVASTATIN CALCIUM 40 MG: 40 TABLET, FILM COATED ORAL at 17:19

## 2022-04-21 RX ADMIN — IPRATROPIUM BROMIDE 0.5 MG: 0.5 SOLUTION RESPIRATORY (INHALATION) at 13:24

## 2022-04-21 RX ADMIN — Medication 3 MG: at 21:24

## 2022-04-21 RX ADMIN — LEVALBUTEROL HYDROCHLORIDE 0.63 MG: 0.63 SOLUTION RESPIRATORY (INHALATION) at 13:24

## 2022-04-21 RX ADMIN — INSULIN LISPRO 1 UNITS: 100 INJECTION, SOLUTION INTRAVENOUS; SUBCUTANEOUS at 17:16

## 2022-04-21 RX ADMIN — CLOTRIMAZOLE 10 MG: 10 LOZENGE ORAL at 21:24

## 2022-04-21 NOTE — PROGRESS NOTES
Progress Note - Pulmonary   Gurinder Byers 77 y o  male MRN: [id number]  Unit/Bed#: -01 Encounter: [id number]    Assessment & Plan:  Acute hypoxic respiratory failure  Sepsis, POA  Strep pneumonia  Abnormal CT chest with enlarging right lower lobe nodule and worsening mediastinal lymphadenopathy  Lymphoma with progression  History of multiple myeloma and colon cancer  History of severe COVID May 2021    · Patient was initially requiring 5 L now saturating well on room air at rest   Recommend ambulatory pulse ox prior to discharge  · Continue Unasyn, can be transition to Augmentin for discharge  Follow-up on repeat procalcitonin  · Continue scheduled nebs  · Close outpatient follow-up will be needed with Oncology and ENT for lymph node biopsy    Discussed with Internal Medicine and Pulmonary attendings    Subjective:   Patient feels he continues to improve every day  No shortness of breath  Some cough with difficulty to produce sputum  Objective:     Vitals: Blood pressure 139/53, pulse 79, temperature 99 7 °F (37 6 °C), resp  rate 16, height 5' 11" (1 803 m), weight 78 4 kg (172 lb 13 5 oz), SpO2 92 %  ,Body mass index is 24 11 kg/m²  Intake/Output Summary (Last 24 hours) at 4/21/2022 1132  Last data filed at 4/21/2022 1407  Gross per 24 hour   Intake --   Output 600 ml   Net -600 ml       Invasive Devices  Report    Peripheral Intravenous Line            Peripheral IV 04/19/22 Left Forearm 2 days    Peripheral IV 04/19/22 Right Antecubital 2 days                Physical Exam:  General appearance: Alert and oriented, in no acute distress  Head: Normocephalic, without obvious abnormality, atraumatic  Eyes: EOMI  No discharge bilaterally  No scleral icterus     Neck: Supple, symmetrical, trachea midline  Lungs:  Coarse rhonchi  Heart: Regular rate and rhythm, S1, S2 normal, no murmur  Abdomen:  No appreciable distension or tenderness  Extremities:  No edema or tenderness  Skin: Warm and dry  Neurologic: No acute focal deficits are noted      Labs: I have personally reviewed pertinent lab results  Imaging and other studies: I have personally reviewed pertinent reports

## 2022-04-21 NOTE — PLAN OF CARE
Problem: INFECTION - ADULT  Goal: Absence or prevention of progression during hospitalization  Description: INTERVENTIONS:  - Assess and monitor for signs and symptoms of infection  - Monitor lab/diagnostic results  - Monitor all insertion sites, i e  indwelling lines, tubes, and drains  - Monitor endotracheal if appropriate and nasal secretions for changes in amount and color  - [place] appropriate cooling/warming therapies per order  - Administer medications as ordered  - Instruct and encourage patient and family to use good hand hygiene technique  - Identify and instruct in appropriate isolation precautions for identified infection/condition  Outcome: Progressing  Goal: Absence of fever/infection during neutropenic period  Description: INTERVENTIONS:  - Monitor WBC    Outcome: Progressing     Problem: DISCHARGE PLANNING  Goal: Discharge to home or other facility with appropriate resources  Description: INTERVENTIONS:  - Identify barriers to discharge w/patient and caregiver  - Arrange for needed discharge resources and transportation as appropriate  - Identify discharge learning needs (meds, wound care, etc )  - Arrange for interpretive services to assist at discharge as needed  - Refer to Case Management Department for coordinating discharge planning if the patient needs post-hospital services based on physician/advanced practitioner order or complex needs related to functional status, cognitive ability, or social support system  Outcome: Progressing     Problem: DISCHARGE PLANNING  Goal: Discharge to home or other facility with appropriate resources  Description: INTERVENTIONS:  - Identify barriers to discharge w/patient and caregiver  - Arrange for needed discharge resources and transportation as appropriate  - Identify discharge learning needs (meds, wound care, etc )  - Arrange for interpretive services to assist at discharge as needed  - Refer to Case Management Department for coordinating discharge planning if the patient needs post-hospital services based on physician/advanced practitioner order or complex needs related to functional status, cognitive ability, or social support system  Outcome: Progressing

## 2022-04-21 NOTE — PROCEDURES
Video Swallow Study      Patient Name: Benigno Gandhi  Today's Date: 4/21/2022        Past Medical History  Past Medical History:   Diagnosis Date    Angina pectoris ([address] )     Arthritis     [facility]rial fibrillation [facility] [address] [facility]     Cancer [facility] [address] [facility]     Cataract     Colitis     Colon cancer [facility] [address] [facility]     Colon polyp     Diabetes mellitus (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 [address] [facility]     Pneumonia     Skin cancer         Past Surgical History  Past Surgical History:   Procedure Laterality Date    AORTIC VALVE REPLACEMENT      CATARACT EXTRACTION Bilateral     COLECTOMY      COLONOSCOPY  11/2019    Prior right hemicolectomy    CORONARY ARTERY BYPASS GRAFT      DENTAL SURGERY      KNEE SURGERY      LYMPHADENECTOMY      OTHER SURGICAL HISTORY      MAZE PROCEDURE    WA TOTAL KNEE ARTHROPLASTY Left 1/28/2019    Procedure: ARTHROPLASTY LEFT KNEE TOTAL;  Surgeon: Sheridan Deng MD;  Location: [facility];  Service: Orthopedics    SKIN BIOPSY      UPPER GASTROINTESTINAL ENDOSCOPY  11/2019    Schatzki ring    US GUIDED LYMPH NODE BIOPSY RIGHT  12/14/2021           Video Barium Swallow Study    Summary:  Images are on PACS for review  Pt presents w/ WFL oral, mod-severe pharyngeal dysphagia  Noted mass on posterior pharyngeal wall (identified on ENT scope 3/31/22) is impeding epiglottic inversion and bolus clearance resulting in penetration of all material  All material penetrates to the vocal cords, lining the cords(trace aspiration) and not independently ejected  Several attempted strategies not effective in reducing penetration/trace aspiration events  Dorian aspiration x1 w/ soft solid chicken, pt coughed and ejected material from trachea   Mod vallecular retention w/ all, increased w/ advanced texture consistencies  Per gross esophageal screen:  Unremarkable  Barium tablet not administered today for pt safety  VBS findings thoroughly reviewed w/ pt w/ use of images to aid in understanding  Education provided on risks of aspiration w/ each administered consistency and options for diet modifications to potentially reduce aspiration, though aspiration risk remains w/ all PO  Further education provided on risks vs benefits of liquid/solid texture modifications (reduced aspiration risk and subsequent medical implications, increased retention, potential dehydration, etc )  Education provided on strategies to optimize swallow safety without dysphagia diet modifications, including the importance of oral care and s/s medical complications associated w/ aspiration to monitor for and notify medical team of should they arise  Pt verbalized understanding of options and at this time states acceptance of risk associated w/ puree and thin liquids  Physician notified of decision  Recommendations:  Diet: Defer to physician though consider puree w/ thin   Meds: Non-oral if possible, crush if necessary   Strategies: Effortful swallows   Upright position  F/u ST tx: Yes  Therapy Prognosis: Guarded  Prognosis considerations: Medical plan for pharyngeal mass   Aspiration Precautions  Reflux Precautions  Consider consult with:   Results reviewed with: pt, nursing, family, physician, dietician  Aspiration precautions posted  If a dedicated assessment of the esophagus is desired, consider esophagram/barium swallow or EGD  Patient's goal: none stated       Goals:  Pt will tolerate least restrictive diet w/out s/s aspiration or oral/pharyngeal difficulties  Special Studies:  ENT 3/31/22    Procedure Notes:  Nasopharynx unremarkable, with normal eustachian tube orifices and normal Fossa of Rosenmuller bilaterally   Posterior nasopharyngeal and oropharyngeal walls normal  Oropharyngeal mucosa moist, no masses or lesions  Tongue base normal without masses or lesions  Vallecula and pyriform sinuses clear, without pooling of secretions  Epiglottis, aryepiglottic folds and remainder of supraglottis well-appearing  True vocal folds mobile, without masses or lesions, normal glottic chink       2 5cm mass R post/lat pharyngeal wall, airway patent    Patient tolerance: Patient tolerated the procedure well with no immediate complications         Procedure: Fine Needle Aspiration Biopsy     Anesthesia: local, lidocaine 1% with 1/100,000 epi - 3 cc     Procedure:  Local anesthesia was placed in the skin overlying the mass   A 21g needle was utilized to make two passes into the mass - the slides and cytolyte were prepared in the usual fashion   Bleeding was controlled   The patient tolerated the procedure well   F/u two weeks to discuss the results of the biopsy  Previous VBS:  11/27: Summary: The patient presented with a mild pharyngeal dysphagia characterized by inconsistent epiglottic inversion, with decreased base of tongue movement  This resulted in valleculae retention with regular solids, and barium tablet          Recommendations:  Diet: Mechanical soft   Liquids: Thin liquids              Meds: CRUSHED in puree      Strategies: Take small bites/sips, alternate liquids and solids, moisten dry solids and crush medications   Upright position: Yes      F/u ST tx: Yes  To ensure safety with diet and with medication administration, also educate patient and wife on diet prep at home  Recommend outpatient ST for possible laryngeal strengthening exercises and repeat VBS  Does the pt have pain? No   If yes, was nursing made aware/was it addressed?  N/A      Precautions:  -     Food Allergies:  No    Current Diet:  Puree w/ nt     Premorbid diet:  Regular w/ thin     Dentition:  Adequate    O2 requirement:  RA    Oral mech:  Strength and ROM: WFL     Vocal Quality/Speech:  Clear/adequate    Cognitive status:  Awake, alert     Consistencies administered: Puree, banana, hard solid,  chicken salad, honey thick liquid, nectar thick liquid, thin liquid  Liquids were administered/taken by straw/cup  Pt was seated laterally at 90 degrees  Oral stage:  Lip closure: wfl   Mastication:  wfl   Bolus formation: wfl   Bolus control: fair   Transfer: wfl    Residue: no     Pharyngeal stage:  Swallow promptness: fairly prompt - mild delay   Spill to valleculae: consistent   Spill to pyriforms: no    Epiglottic inversion: min movement, impeded by mass   Laryngeal excursion: min   Pharyngeal constriction: reduced   Vallecular retention: mod   Pyriform retention: mild   PPW coating: coating around mass   Osteophytes: no   CP prominence: yes    Retropulsion from prominence: yes   Transient penetration: no   Epiglottic undercoat: w/ all   Penetration: w/ all (PAS 5)  Aspiration: trace w/ all (PAS 5), neptali w/ chicken (PAS 6)  Strategies: chin tuck, neck extension, head turn R/L, effortful swallows not effective in reducing penetration and ongoing aspiration   Response to aspiration: cough     8-Point Penetration-Aspiration Scale   1 Material does not enter the airway   2 Material enters the airway, remains above the vocal folds, and is ejected  from the  airway    3 Material enters the airway, remains above the vocal folds, and is not ejected from the airway   4 Material enters the airway, contacts the vocal folds, and is ejected from the airway   5 Material enters the airway, contacts the vocal folds, and is not ejected from the airway    6 Material enters the airway, passes below the vocal folds and is ejected into the larynx or out of the airway    7 Material enters the airway, passes below the vocal folds, and is not ejected from the trachea despite effort    8 Material enters the airway, passes below the vocal folds, and no effort is made to eject        Screening of Esophageal stage:   WNL

## 2022-04-21 NOTE — ASSESSMENT & PLAN NOTE
Wt Readings from Last 3 Encounters:   04/21/22 78 4 kg (172 lb 13 5 oz)   04/18/22 73 3 kg (161 lb 9 6 oz)   04/07/22 73 5 kg (162 lb)     Patient has chronic systolic CHF with ejection fraction 40%      He has no evidence of acute systolic CHF    Continue Toprol and Lasix

## 2022-04-21 NOTE — ASSESSMENT & PLAN NOTE
Patient presented with bilateral pneumonia due to strep pneumonia  He is responding to IV Unasyn:  Feels stronger, shortness of breath is improving, he is no longer hypoxic, leukocytosis decreasing  Will continue Unasyn 3 g IV q 6 hours      Discussed case with patient's wife at the bedside in detail on April 21th

## 2022-04-21 NOTE — PLAN OF CARE
Problem: PAIN - ADULT  Goal: Verbalizes/displays adequate comfort level or baseline comfort level  Description: Interventions:  - Encourage patient to monitor pain and request assistance  - Assess pain using appropriate pain scale  - Administer analgesics based on type and severity of pain and evaluate response  - Implement non-pharmacological measures as appropriate and evaluate response  - Consider cultural and social influences on pain and pain management  - Notify physician/advanced practitioner if interventions unsuccessful or patient reports new pain  Outcome: Progressing     Problem: INFECTION - ADULT  Goal: Absence or prevention of progression during hospitalization  Description: INTERVENTIONS:  - Assess and monitor for signs and symptoms of infection  - Monitor lab/diagnostic results  - Monitor all insertion sites, i e  indwelling lines, tubes, and drains  - Monitor endotracheal if appropriate and nasal secretions for changes in amount and color  - [place] appropriate cooling/warming therapies per order  - Administer medications as ordered  - Instruct and encourage patient and family to use good hand hygiene technique  - Identify and instruct in appropriate isolation precautions for identified infection/condition  Outcome: Progressing  Goal: Absence of fever/infection during neutropenic period  Description: INTERVENTIONS:  - Monitor WBC    Outcome: Progressing     Problem: SAFETY ADULT  Goal: Patient will remain free of falls  Description: INTERVENTIONS:  - Educate patient/family on patient safety including physical limitations  - Instruct patient to call for assistance with activity   - Consult OT/PT to assist with strengthening/mobility   - Keep Call bell within reach  - Keep bed low and locked with side rails adjusted as appropriate  - Keep care items and personal belongings within reach  - Initiate and maintain comfort rounds  - Make Fall Risk Sign visible to staff  - Offer Toileting every 2 Hours, in advance of need  - Initiate/Maintain alarm  - Obtain necessary fall risk management equipment  - Apply yellow socks and bracelet for high fall risk patients  - Consider moving patient to room near nurses station  Outcome: Progressing  Goal: Maintain or return to baseline ADL function  Description: INTERVENTIONS:  -  Assess patient's ability to carry out ADLs; assess patient's baseline for ADL function and identify physical deficits which impact ability to perform ADLs (bathing, care of mouth/teeth, toileting, grooming, dressing, etc )  - Assess/evaluate cause of self-care deficits   - Assess range of motion  - Assess patient's mobility; develop plan if impaired  - Assess patient's need for assistive devices and provide as appropriate  - Encourage maximum independence but intervene and supervise when necessary  - Involve family in performance of ADLs  - Assess for home care needs following discharge   - Consider OT consult to assist with ADL evaluation and planning for discharge  - Provide patient education as appropriate  Outcome: Progressing  Goal: Maintains/Returns to pre admission functional level  Description: INTERVENTIONS:  - Perform BMAT or MOVE assessment daily    - Set and communicate daily mobility goal to care team and patient/family/caregiver  - Collaborate with rehabilitation services on mobility goals if consulted  - Perform Range of Motion 4 times a day  - Reposition patient every 2 hours    - Dangle patient 4 times a day  - Stand patient 4 times a day  - Ambulate patient 4 times a day  - Out of bed to chair 4 times a day   - Out of bed for meals 3 times a day  - Out of bed for toileting  - Record patient progress and toleration of activity level   Outcome: Progressing     Problem: DISCHARGE PLANNING  Goal: Discharge to home or other facility with appropriate resources  Description: INTERVENTIONS:  - Identify barriers to discharge w/patient and caregiver  - Arrange for needed discharge resources and transportation as appropriate  - Identify discharge learning needs (meds, wound care, etc )  - Arrange for interpretive services to assist at discharge as needed  - Refer to Case Management Department for coordinating discharge planning if the patient needs post-hospital services based on physician/advanced practitioner order or complex needs related to functional status, cognitive ability, or social support system  Outcome: Progressing     Problem: Knowledge Deficit  Goal: Patient/family/caregiver demonstrates understanding of disease process, treatment plan, medications, and discharge instructions  Description: Complete learning assessment and assess knowledge base  Interventions:  - Provide teaching at level of understanding  - Provide teaching via preferred learning methods  Outcome: Progressing     Problem: RESPIRATORY - ADULT  Goal: Achieves optimal ventilation and oxygenation  Description: INTERVENTIONS:  - Assess for changes in respiratory status  - Assess for changes in mentation and behavior  - Position to facilitate oxygenation and minimize respiratory effort  - Oxygen administered by appropriate delivery if ordered  - Initiate smoking cessation education as indicated  - Encourage broncho-pulmonary hygiene including cough, deep breathe, Incentive Spirometry  - Assess the need for suctioning and aspirate as needed  - Assess and instruct to report SOB or any respiratory difficulty  - Respiratory Therapy support as indicated  Outcome: Progressing     Problem: Nutrition/Hydration-ADULT  Goal: Nutrient/Hydration intake appropriate for improving, restoring or maintaining nutritional needs  Description: Monitor and assess patient's nutrition/hydration status for malnutrition  Collaborate with interdisciplinary team and initiate plan and interventions as ordered  Monitor patient's weight and dietary intake as ordered or per policy  Utilize nutrition screening tool and intervene as necessary   Determine patient's food preferences and provide high-protein, high-caloric foods as appropriate       INTERVENTIONS:  - Monitor oral intake, urinary output, labs, and treatment plans  - Assess nutrition and hydration status and recommend course of action  - Evaluate amount of meals eaten  - Assist patient with eating if necessary   - Allow adequate time for meals  - Recommend/ encourage appropriate diets, oral nutritional supplements, and vitamin/mineral supplements  - Order, calculate, and assess calorie counts as needed  - Recommend, monitor, and adjust tube feedings and TPN/PPN based on assessed needs  - Assess need for intravenous fluids  - Provide specific nutrition/hydration education as appropriate  - Include patient/family/caregiver in decisions related to nutrition  Outcome: Progressing

## 2022-04-21 NOTE — PROGRESS NOTES
[place]  Progress Note - Glenn Stone 1945, 77 y o  male MRN: [id number]  Unit/Bed#: -01 Encounter: [id number]  Primary Care Provider: Gina Ribera DO   Date and time admitted to hospital: 2022  1:20 AM    * Pneumonia due to Streptococcus pneumoniae [facility])  Assessment & Plan  Patient presented with bilateral pneumonia due to strep pneumonia  He is responding to IV Unasyn:  Feels stronger, shortness of breath is improving, he is no longer hypoxic, leukocytosis decreasing  Will continue Unasyn 3 g IV q 6 hours  Discussed case with patient's wife at the bedside in detail on     Chronic systolic congestive heart failure [facility])  Assessment & Plan  Wt Readings from Last 3 Encounters:   22 78 4 kg (172 lb 13 5 oz)   22 73 3 kg (161 lb 9 6 oz)   22 73 5 kg (162 lb)     Patient has chronic systolic CHF with ejection fraction 40%  He has no evidence of acute systolic CHF    Continue Toprol and Lasix            Pharyngeal dysphagia  Assessment & Plan  Patient has pharyngeal dysphagia due to lymphadenopathy  Patient will follow-up with ENT for evaluation of lymphoma  Continue dysphagia diet        VTE Prophylaxis: in place    Patient Centered Rounds: I rounded with patient's nurse    Current Length of Stay: 2 day(s)    Current Patient Status: Inpatient    Certification Statement: Pt requires additional inpatient hospital stay due to: see assessment and plan        Subjective:   Patient reports decreasing cough, improving breathing, he denies pleurisy, chest pain abdominal pain, diarrhea    All other ROS are negative    Objective:     Vitals:   Temp (24hrs), Av 1 °F (37 3 °C), Min:97 4 °F (36 3 °C), Max:100 4 °F (38 °C)    Temp:  [97 4 °F (36 3 °C)-100 4 °F (38 °C)] 97 4 °F (36 3 °C)  HR:  [73-79] 73  BP: (125-139)/(53-61) 125/55  SpO2:  [90 %-94 %] 94 %  Body mass index is 24 11 kg/m²       Input and Output Summary (last 24 hours): Intake/Output Summary (Last 24 hours) at 4/21/2022 1639  Last data filed at 4/21/2022 0428  Gross per 24 hour   Intake --   Output 600 ml   Net -600 ml       Physical Exam:     Physical Exam  Constitutional:       General: He is not in acute distress  Appearance: He is not toxic-appearing  HENT:      Mouth/Throat:      Mouth: Mucous membranes are moist    Eyes:      Conjunctiva/sclera: Conjunctivae normal    Neck:      Comments: Patient has no JVD  Cardiovascular:      Rate and Rhythm: Normal rate and regular rhythm  Pulmonary:      Effort: No respiratory distress  Breath sounds: Rales (Bilateral inspiratory crackles are present) present  No wheezing  Abdominal:      General: Bowel sounds are normal       Palpations: Abdomen is soft  Tenderness: There is no abdominal tenderness  Musculoskeletal:      Cervical back: Neck supple  Lymphadenopathy:      Cervical: Cervical adenopathy present  Skin:     General: Skin is warm and dry  Comments: He has no lower extremity edema   Neurological:      Mental Status: He is alert and oriented to person, place, and time  Mental status is at baseline  Cranial Nerves: No cranial nerve deficit  Motor: No weakness  I personally reviewed labs and imaging reports for today        Last 24 Hours Medication List:   Current Facility-Administered Medications   Medication Dose Route Frequency Provider Last Rate    acetaminophen  650 mg Oral Q6H PRN Jeremy Reddy PA-C      ampicillin-sulbactam  3 g Intravenous Q6H Michelle Pickard MD      aspirin  81 mg Oral Daily Jeremy Reddy PA-C      atorvastatin  40 mg Oral After Dinner MORGAN Luna-FLACO      cholecalciferol  2,000 Units Oral Daily Jeremy Barry, PA-C      clotrimazole  10 mg Oral 5x Daily Michelle Pickard MD      furosemide  20 mg Oral BID MORGAN Luna-FLACO      guaiFENesin  600 mg Oral BID Jeremy Barry, PA-FLACO      heparin (porcine)  5,000 Units Subcutaneous [facility] Jeremyjean Reddy, AWA      insulin lispro  1-6 Units Subcutaneous TID AC Karen Monte PA-C      insulin lispro  1-6 Units Subcutaneous HS Ezell Dandy, PA-C      ipratropium  0 5 mg Nebulization TID Lora Bloch, DO      levalbuterol  0 63 mg Nebulization TID Lora Bloch, DO      melatonin  3 mg Oral HS Ezell Dandy, PA-C      metoprolol succinate  100 mg Oral Daily Ezell Dandy, PA-C      multivitamin-minerals  1 tablet Oral Daily Karen Monte PA-C      ondansetron  4 mg Intravenous Q6H PRN Ezell Dandy, PA-C      pantoprazole  40 mg Oral Daily Before Breakfast Ezell Dandy, PA-C            Today, Patient Was Seen By: Moshe Ramirez MD    ** Please Note: Dictation voice to text software may have been used in the creation of this document   **

## 2022-04-21 NOTE — ASSESSMENT & PLAN NOTE
Patient has pharyngeal dysphagia due to lymphadenopathy  Patient will follow-up with ENT for evaluation of lymphoma      Continue dysphagia diet

## 2022-04-22 VITALS
HEIGHT: 71 IN | RESPIRATION RATE: 20 BRPM | BODY MASS INDEX: 23.15 KG/M2 | OXYGEN SATURATION: 92 % | TEMPERATURE: 98.4 F | HEART RATE: 88 BPM | SYSTOLIC BLOOD PRESSURE: 141 MMHG | WEIGHT: 165.34 LBS | DIASTOLIC BLOOD PRESSURE: 63 MMHG

## 2022-04-22 LAB
ANION GAP SERPL CALCULATED.3IONS-SCNC: 9 MMOL/L (ref 4–13)
ATRIAL RATE: 102 BPM
ATRIAL RATE: 109 BPM
BACTERIA SPT RESP CULT: ABNORMAL
BASOPHILS # BLD AUTO: 0.08 THOUSANDS/ΜL (ref 0–0.1)
BASOPHILS NFR BLD AUTO: 1 % (ref 0–1)
BUN SERPL-MCNC: 7 MG/DL (ref 5–25)
CALCIUM SERPL-MCNC: 7.9 MG/DL (ref 8.3–10.1)
CHLORIDE SERPL-SCNC: 102 MMOL/L (ref 100–108)
CO2 SERPL-SCNC: 28 MMOL/L (ref 21–32)
CREAT SERPL-MCNC: 0.82 MG/DL (ref 0.6–1.3)
EOSINOPHIL # BLD AUTO: 0.41 THOUSAND/ΜL (ref 0–0.61)
EOSINOPHIL NFR BLD AUTO: 3 % (ref 0–6)
ERYTHROCYTE [DISTWIDTH] IN BLOOD BY AUTOMATED COUNT: 15 % (ref 11.6–15.1)
GFR SERPL CREATININE-BSD FRML MDRD: 86 ML/MIN/1.73SQ M
GLUCOSE SERPL-MCNC: 133 MG/DL (ref 65–140)
GLUCOSE SERPL-MCNC: 137 MG/DL (ref 65–140)
GLUCOSE SERPL-MCNC: 168 MG/DL (ref 65–140)
GRAM STN SPEC: ABNORMAL
HCT VFR BLD AUTO: 29.9 % (ref 36.5–49.3)
HGB BLD-MCNC: 9.3 G/DL (ref 12–17)
IMM GRANULOCYTES # BLD AUTO: 0.08 THOUSAND/UL (ref 0–0.2)
IMM GRANULOCYTES NFR BLD AUTO: 1 % (ref 0–2)
LYMPHOCYTES # BLD AUTO: 1.44 THOUSANDS/ΜL (ref 0.6–4.47)
LYMPHOCYTES NFR BLD AUTO: 12 % (ref 14–44)
MCH RBC QN AUTO: 26 PG (ref 26.8–34.3)
MCHC RBC AUTO-ENTMCNC: 31.1 G/DL (ref 31.4–37.4)
MCV RBC AUTO: 84 FL (ref 82–98)
MONOCYTES # BLD AUTO: 0.87 THOUSAND/ΜL (ref 0.17–1.22)
MONOCYTES NFR BLD AUTO: 7 % (ref 4–12)
NEUTROPHILS # BLD AUTO: 9.27 THOUSANDS/ΜL (ref 1.85–7.62)
NEUTS SEG NFR BLD AUTO: 76 % (ref 43–75)
NRBC BLD AUTO-RTO: 0 /100 WBCS
P AXIS: 58 DEGREES
P AXIS: 87 DEGREES
PLATELET # BLD AUTO: 312 THOUSANDS/UL (ref 149–390)
PMV BLD AUTO: 9 FL (ref 8.9–12.7)
POTASSIUM SERPL-SCNC: 3 MMOL/L (ref 3.5–5.3)
PR INTERVAL: 200 MS
PR INTERVAL: 204 MS
QRS AXIS: -70 DEGREES
QRS AXIS: 27 DEGREES
QRSD INTERVAL: 128 MS
QRSD INTERVAL: 128 MS
QT INTERVAL: 360 MS
QT INTERVAL: 386 MS
QTC INTERVAL: 484 MS
QTC INTERVAL: 503 MS
RBC # BLD AUTO: 3.58 MILLION/UL (ref 3.88–5.62)
SODIUM SERPL-SCNC: 139 MMOL/L (ref 136–145)
T WAVE AXIS: 85 DEGREES
T WAVE AXIS: 86 DEGREES
VENTRICULAR RATE: 102 BPM
VENTRICULAR RATE: 109 BPM
WBC # BLD AUTO: 12.15 THOUSAND/UL (ref 4.31–10.16)

## 2022-04-22 PROCEDURE — 85025 COMPLETE CBC W/AUTO DIFF WBC: CPT | Performed by: INTERNAL MEDICINE

## 2022-04-22 PROCEDURE — 94761 N-INVAS EAR/PLS OXIMETRY MLT: CPT

## 2022-04-22 PROCEDURE — 92526 ORAL FUNCTION THERAPY: CPT

## 2022-04-22 PROCEDURE — 99239 HOSP IP/OBS DSCHRG MGMT >30: CPT | Performed by: INTERNAL MEDICINE

## 2022-04-22 PROCEDURE — 94640 AIRWAY INHALATION TREATMENT: CPT

## 2022-04-22 PROCEDURE — 94668 MNPJ CHEST WALL SBSQ: CPT

## 2022-04-22 PROCEDURE — 93010 ELECTROCARDIOGRAM REPORT: CPT | Performed by: INTERNAL MEDICINE

## 2022-04-22 PROCEDURE — 94760 N-INVAS EAR/PLS OXIMETRY 1: CPT

## 2022-04-22 PROCEDURE — 82948 REAGENT STRIP/BLOOD GLUCOSE: CPT

## 2022-04-22 PROCEDURE — 80048 BASIC METABOLIC PNL TOTAL CA: CPT | Performed by: INTERNAL MEDICINE

## 2022-04-22 RX ORDER — FERROUS SULFATE 324(65)MG
324 TABLET, DELAYED RELEASE (ENTERIC COATED) ORAL
Qty: 30 TABLET | Refills: 1 | Status: SHIPPED | OUTPATIENT
Start: 2022-04-22 | End: 2022-05-15

## 2022-04-22 RX ORDER — AMOXICILLIN AND CLAVULANATE POTASSIUM 875; 125 MG/1; MG/1
1 TABLET, FILM COATED ORAL EVERY 12 HOURS SCHEDULED
Qty: 8 TABLET | Refills: 0 | Status: SHIPPED | OUTPATIENT
Start: 2022-04-22 | End: 2022-04-27

## 2022-04-22 RX ORDER — GUAIFENESIN 600 MG
600 TABLET, EXTENDED RELEASE 12 HR ORAL 2 TIMES DAILY
Qty: 20 TABLET | Refills: 0 | Status: SHIPPED | OUTPATIENT
Start: 2022-04-22 | End: 2022-08-01

## 2022-04-22 RX ORDER — POTASSIUM CHLORIDE 20 MEQ/1
40 TABLET, EXTENDED RELEASE ORAL 2 TIMES DAILY
Status: DISCONTINUED | OUTPATIENT
Start: 2022-04-22 | End: 2022-04-22 | Stop reason: HOSPADM

## 2022-04-22 RX ADMIN — AMPICILLIN SODIUM AND SULBACTAM SODIUM 3 G: 2; 1 INJECTION, POWDER, FOR SOLUTION INTRAMUSCULAR; INTRAVENOUS at 03:32

## 2022-04-22 RX ADMIN — AMPICILLIN SODIUM AND SULBACTAM SODIUM 3 G: 2; 1 INJECTION, POWDER, FOR SOLUTION INTRAMUSCULAR; INTRAVENOUS at 08:59

## 2022-04-22 RX ADMIN — HEPARIN SODIUM 5000 UNITS: 5000 INJECTION INTRAVENOUS; SUBCUTANEOUS at 13:16

## 2022-04-22 RX ADMIN — LEVALBUTEROL HYDROCHLORIDE 0.63 MG: 0.63 SOLUTION RESPIRATORY (INHALATION) at 07:08

## 2022-04-22 RX ADMIN — IPRATROPIUM BROMIDE 0.5 MG: 0.5 SOLUTION RESPIRATORY (INHALATION) at 13:39

## 2022-04-22 RX ADMIN — ASPIRIN 81 MG CHEWABLE TABLET 81 MG: 81 TABLET CHEWABLE at 08:59

## 2022-04-22 RX ADMIN — CLOTRIMAZOLE 10 MG: 10 LOZENGE ORAL at 13:18

## 2022-04-22 RX ADMIN — FUROSEMIDE 20 MG: 20 TABLET ORAL at 08:58

## 2022-04-22 RX ADMIN — GUAIFENESIN 600 MG: 600 TABLET ORAL at 08:59

## 2022-04-22 RX ADMIN — CLOTRIMAZOLE 10 MG: 10 LOZENGE ORAL at 06:08

## 2022-04-22 RX ADMIN — HEPARIN SODIUM 5000 UNITS: 5000 INJECTION INTRAVENOUS; SUBCUTANEOUS at 06:08

## 2022-04-22 RX ADMIN — IPRATROPIUM BROMIDE 0.5 MG: 0.5 SOLUTION RESPIRATORY (INHALATION) at 07:08

## 2022-04-22 RX ADMIN — POTASSIUM CHLORIDE 40 MEQ: 20 TABLET, EXTENDED RELEASE ORAL at 08:59

## 2022-04-22 RX ADMIN — METOPROLOL SUCCINATE 100 MG: 50 TABLET, EXTENDED RELEASE ORAL at 08:58

## 2022-04-22 RX ADMIN — INSULIN LISPRO 1 UNITS: 100 INJECTION, SOLUTION INTRAVENOUS; SUBCUTANEOUS at 12:21

## 2022-04-22 RX ADMIN — LEVALBUTEROL HYDROCHLORIDE 0.63 MG: 0.63 SOLUTION RESPIRATORY (INHALATION) at 13:39

## 2022-04-22 RX ADMIN — Medication 2000 UNITS: at 08:58

## 2022-04-22 RX ADMIN — Medication 1 TABLET: at 08:58

## 2022-04-22 RX ADMIN — PANTOPRAZOLE SODIUM 40 MG: 40 TABLET, DELAYED RELEASE ORAL at 08:59

## 2022-04-22 NOTE — ASSESSMENT & PLAN NOTE
Patient has iron deficiency anemia  Denies signs of GI  bleeding    His abdomen soft and nontender on day of discharge    I am discharging patient on iron sulfate

## 2022-04-22 NOTE — ASSESSMENT & PLAN NOTE
Patient has PAF  He remained in sinus rhythm during hospital stay    Heart rhythm is regular on day of discharge    Continue Toprol

## 2022-04-22 NOTE — ASSESSMENT & PLAN NOTE
Patient met criteria for sepsis admission due to strep pneumo pneumonia  Blood culture showed contaminants in 1 set  Sepsis resolved  Patient is not toxic looking on day of discharge

## 2022-04-22 NOTE — PLAN OF CARE
Problem: PAIN - ADULT  Goal: Verbalizes/displays adequate comfort level or baseline comfort level  Description: Interventions:  - Encourage patient to monitor pain and request assistance  - Assess pain using appropriate pain scale  - Administer analgesics based on type and severity of pain and evaluate response  - Implement non-pharmacological measures as appropriate and evaluate response  - Consider cultural and social influences on pain and pain management  - Notify physician/advanced practitioner if interventions unsuccessful or patient reports new pain  Outcome: Progressing     Problem: INFECTION - ADULT  Goal: Absence or prevention of progression during hospitalization  Description: INTERVENTIONS:  - Assess and monitor for signs and symptoms of infection  - Monitor lab/diagnostic results  - Monitor all insertion sites, i e  indwelling lines, tubes, and drains  - Monitor endotracheal if appropriate and nasal secretions for changes in amount and color  - [place] appropriate cooling/warming therapies per order  - Administer medications as ordered  - Instruct and encourage patient and family to use good hand hygiene technique  - Identify and instruct in appropriate isolation precautions for identified infection/condition  Outcome: Progressing  Goal: Absence of fever/infection during neutropenic period  Description: INTERVENTIONS:  - Monitor WBC    Outcome: Progressing     Problem: SAFETY ADULT  Goal: Patient will remain free of falls  Description: INTERVENTIONS:  - Educate patient/family on patient safety including physical limitations  - Instruct patient to call for assistance with activity   - Consult OT/PT to assist with strengthening/mobility   - Keep Call bell within reach  - Keep bed low and locked with side rails adjusted as appropriate  - Keep care items and personal belongings within reach  - Initiate and maintain comfort rounds  - Make Fall Risk Sign visible to staff  - Offer Toileting every 2 Hours, in advance of need  - Initiate/Maintain alarm  - Obtain necessary fall risk management equipment  - Apply yellow socks and bracelet for high fall risk patients  - Consider moving patient to room near nurses station  Outcome: Progressing  Goal: Maintain or return to baseline ADL function  Description: INTERVENTIONS:  -  Assess patient's ability to carry out ADLs; assess patient's baseline for ADL function and identify physical deficits which impact ability to perform ADLs (bathing, care of mouth/teeth, toileting, grooming, dressing, etc )  - Assess/evaluate cause of self-care deficits   - Assess range of motion  - Assess patient's mobility; develop plan if impaired  - Assess patient's need for assistive devices and provide as appropriate  - Encourage maximum independence but intervene and supervise when necessary  - Involve family in performance of ADLs  - Assess for home care needs following discharge   - Consider OT consult to assist with ADL evaluation and planning for discharge  - Provide patient education as appropriate  Outcome: Progressing  Goal: Maintains/Returns to pre admission functional level  Description: INTERVENTIONS:  - Perform BMAT or MOVE assessment daily    - Set and communicate daily mobility goal to care team and patient/family/caregiver  - Collaborate with rehabilitation services on mobility goals if consulted  - Perform Range of Motion 4 times a day  - Reposition patient every 2 hours    - Dangle patient 4 times a day  - Stand patient 4 times a day  - Ambulate patient 4 times a day  - Out of bed to chair 4 times a day   - Out of bed for meals 3 times a day  - Out of bed for toileting  - Record patient progress and toleration of activity level   Outcome: Progressing     Problem: DISCHARGE PLANNING  Goal: Discharge to home or other facility with appropriate resources  Description: INTERVENTIONS:  - Identify barriers to discharge w/patient and caregiver  - Arrange for needed discharge resources and transportation as appropriate  - Identify discharge learning needs (meds, wound care, etc )  - Arrange for interpretive services to assist at discharge as needed  - Refer to Case Management Department for coordinating discharge planning if the patient needs post-hospital services based on physician/advanced practitioner order or complex needs related to functional status, cognitive ability, or social support system  Outcome: Progressing     Problem: Knowledge Deficit  Goal: Patient/family/caregiver demonstrates understanding of disease process, treatment plan, medications, and discharge instructions  Description: Complete learning assessment and assess knowledge base  Interventions:  - Provide teaching at level of understanding  - Provide teaching via preferred learning methods  Outcome: Progressing     Problem: RESPIRATORY - ADULT  Goal: Achieves optimal ventilation and oxygenation  Description: INTERVENTIONS:  - Assess for changes in respiratory status  - Assess for changes in mentation and behavior  - Position to facilitate oxygenation and minimize respiratory effort  - Oxygen administered by appropriate delivery if ordered  - Initiate smoking cessation education as indicated  - Encourage broncho-pulmonary hygiene including cough, deep breathe, Incentive Spirometry  - Assess the need for suctioning and aspirate as needed  - Assess and instruct to report SOB or any respiratory difficulty  - Respiratory Therapy support as indicated  Outcome: Progressing     Problem: Nutrition/Hydration-ADULT  Goal: Nutrient/Hydration intake appropriate for improving, restoring or maintaining nutritional needs  Description: Monitor and assess patient's nutrition/hydration status for malnutrition  Collaborate with interdisciplinary team and initiate plan and interventions as ordered  Monitor patient's weight and dietary intake as ordered or per policy  Utilize nutrition screening tool and intervene as necessary   Determine patient's food preferences and provide high-protein, high-caloric foods as appropriate       INTERVENTIONS:  - Monitor oral intake, urinary output, labs, and treatment plans  - Assess nutrition and hydration status and recommend course of action  - Evaluate amount of meals eaten  - Assist patient with eating if necessary   - Allow adequate time for meals  - Recommend/ encourage appropriate diets, oral nutritional supplements, and vitamin/mineral supplements  - Order, calculate, and assess calorie counts as needed  - Recommend, monitor, and adjust tube feedings and TPN/PPN based on assessed needs  - Assess need for intravenous fluids  - Provide specific nutrition/hydration education as appropriate  - Include patient/family/caregiver in decisions related to nutrition  Outcome: Progressing

## 2022-04-22 NOTE — ASSESSMENT & PLAN NOTE
Patient presented with bilateral pneumonia due to strep pneumonia  He Was treated with IV Unasyn  His fevers resolved and leukocytosis significantly improved:  Decreased to 12,000 from 20,000 on admission  Speech therapy saw the patient and he was felt to be high risk for aspiration because of cervical lymphadenopathy for which patient will see ENT as an outpatient  He is discharged on pureed diet and thin liquids  He is in no respiratory distress on room air  Lungs reveal bilateral inspiratory crackles  He has no JVD or lower extremity edema on day of discharge  Patient is stable for discharge on Augmentin for 4 more days      Discussed case with patient's wife at the bedside in detail on April 22nd

## 2022-04-22 NOTE — RESPIRATORY THERAPY NOTE
Home Oxygen Qualifying Test     Patient name: Bria Wyile        : 1945   Date of Test:  2022  Diagnosis:    Home Oxygen Test:    **Medicare Guidelines require item(s) 1-5 on all ambulatory patients or 1 and 2 on non-ambulatory patients  1  Baseline SPO2 on Room Air at rest 92 %   a  If <= 88% on Room Air add O2 via NC to obtain SpO2 >=88%  If LPM needed, document LPM needed to reach =>88%    2  SPO2 during exertion on Room Air 91  %  a  During exertion monitor SPO2  If SPO2 increases >=89%, do not add supplemental oxygen    3  SPO2 on Oxygen at Rest : N/A    4  SPO2 during exertion on Oxygen : N/A    5  Test performed during exertion activity  []  Supplemental Home Oxygen is indicated  [x]  Client does not qualify for home oxygen  Respiratory Additional Notes- Patient walked for 320ft  No dyspnea was noted       Dominick Clifford 105, RT

## 2022-04-22 NOTE — NURSING NOTE
AVS reviewed with patient and wife  All questions answered  Patient and wife verbalized understanding with all teachings  Denies pain, chest pain and/or palpitations  Patient noted AOX4  On room air without any discomfort  Two peripheral IVs removed and well tolerated using aseptic technique  No active bleeding noted at the site  All personal belongings returned  Patient discharged with wife   SD 4/22/2022

## 2022-04-22 NOTE — ASSESSMENT & PLAN NOTE
Patient has pharyngeal dysphagia due to cervical lymphadenopathy  Patient will follow-up with ENT for evaluation of lymphoma      Continue dysphagia diet

## 2022-04-22 NOTE — SPEECH THERAPY NOTE
Speech Language/Pathology    Speech/Language Pathology Progress Note    Patient Name: Alexia Cristina  Today's Date: 4/22/2022     Problem List  Principal Problem:    Pneumonia due to Streptococcus pneumoniae [facility])  Active Problems:    Coronary artery disease involving native heart with angina pectoris, unspecified vessel or lesion type ([facility] )    Type 2 diabetes mellitus without complication, without long-term current use of insulin (HCC)    Dyslipidemia    Lymphoma (HCC)    Essential hypertension    Chronic systolic congestive heart failure (HCC)    Paroxysmal atrial fibrillation (HCC)    Pharyngeal dysphagia    Sepsis ([facility] )    Iron deficiency anemia    Chest pain    Acute respiratory failure with hypoxia (HCC)    Glossitis       Past Medical History  Past Medical History:   Diagnosis Date    [facility]     Arthritis     Atrial fibrillation [facility]     Cancer ([facility] )     Cataract     Colitis     Colon cancer ([facility] )     Colon polyp     Diabetes mellitus [facility]     Fatty liver     GERD (gastroesophageal reflux disease)     History of chemotherapy     History of radiation therapy     History of shingles 2018    History of transfusion     Hyperlipidemia     Hypertension     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     Pneumonia     Skin cancer         Past Surgical History  Past Surgical History:   Procedure Laterality Date    AORTIC VALVE REPLACEMENT      CATARACT EXTRACTION Bilateral     COLECTOMY      COLONOSCOPY  11/2019    Prior right hemicolectomy    CORONARY ARTERY BYPASS GRAFT      DENTAL SURGERY      KNEE SURGERY      LYMPHADENECTOMY      OTHER SURGICAL HISTORY      MAZE PROCEDURE    SD TOTAL KNEE ARTHROPLASTY Left 1/28/2019    Procedure: ARTHROPLASTY LEFT KNEE TOTAL;  Surgeon: Cami Ravi MD;  Location:  MAIN OR;  Service: Orthopedics    SKIN BIOPSY      UPPER GASTROINTESTINAL ENDOSCOPY  11/2019    Schatzki ring    US GUIDED LYMPH NODE BIOPSY RIGHT  12/14/2021         Subjective:  Pt seen for dysphagia tx  Pt sitting upright in chair, wife at bedside  Pt alert, cooperative  Objective:  Nursing requested pt be seen, as he had more difficulty swallowing even pureed foods this morning  Reviewed pt's chart, including recent VBS  Pt reports that he was unable to eat the cream of wheat at breakfast, stating "it wouldn't go down"  He also reports difficulty with thicker pureeds, such as meat  Pt was observed feeding himself lunch, including cream of mushroom soup, and pudding  He drank soda by straw  Oropharyngeal swallows appeared prompt  There was occasional slight throat clear and slight wet vocal quality  Pt was cued to cough/clear throat when his voice is wet; he complied and wetness/gurgle cleared  Oxygen levels remained steady in the mid 90s  Educated pt on choosing smoother, thinner pureeds, as he appeared to tolerate these better  Educated pt's wife with tips on pureeing foods at home, making them thinner with more liquid as needed  Reinforced aspiration precautions  Both verbalized understanding  They report that pt is to have biopsy of the pharyngeal mass soon  Assessment:  Pt appears to be tolerating thinner/smooth pureeds and thin liquids fairly well, no c/o dysphagia or "sticking", but occasional slightly wet vocal quality and throat clear during lunch (VBS shows pt is at risk for aspiration with all consistencies at this time)  Pt appears to be managing current diet well, but should avoid thicker, more textured pureed due to c/o it "sticking, not wanting to go down"  Plan/Recommendations:  Continue pureed diet and thin liquids, choose thinner, smooth pureed  Eat slowly, small bites/sips  Occasional throat clears recommended to clear any penetrated material  Follow up with ENT for further care/intervention

## 2022-04-22 NOTE — ASSESSMENT & PLAN NOTE
Lab Results   Component Value Date    HGBA1C 5 8 (H) 04/19/2022       Recent Labs     04/21/22  1618 04/21/22  2119 04/22/22  0724 04/22/22  1051   POCGLU 169* 138 137 168*       Blood Sugar Average: Last 72 hrs:  · (P) 894 [id number]     Patient has diabetes without complication    A1c is well controlled  Continue metformin on discharge

## 2022-04-22 NOTE — ASSESSMENT & PLAN NOTE
Patient had acute hypoxic respiratory failure on admission as evidenced by respiratory more than 24, oxygen saturation of 89% due to bilateral strep pneumo pneumonia  He was treated with oxygen via nasal cannula      Acute hypoxic respiratory failure resolved and his oxygen saturation on room air is normal   Patient did not qualify for home oxygen on day of discharge

## 2022-04-22 NOTE — ASSESSMENT & PLAN NOTE
Wt Readings from Last 3 Encounters:   04/22/22 75 kg (165 lb 5 5 oz)   04/18/22 73 3 kg (161 lb 9 6 oz)   04/07/22 73 5 kg (162 lb)     Patient has chronic systolic CHF with ejection fraction 40%      He had no acute systolic CHF on this admission    Continue Toprol and Lasix on discharge

## 2022-04-22 NOTE — ASSESSMENT & PLAN NOTE
Patient complains of a burning feeling in the tongue ever since his code infection last year  Examination patient has erythematous, shiny tongue      He was found to have iron deficiency, B73 of folic acid levels were normal

## 2022-04-22 NOTE — PLAN OF CARE
Problem: PAIN - ADULT  Goal: Verbalizes/displays adequate comfort level or baseline comfort level  Description: Interventions:  - Encourage patient to monitor pain and request assistance  - Assess pain using appropriate pain scale  - Administer analgesics based on type and severity of pain and evaluate response  - Implement non-pharmacological measures as appropriate and evaluate response  - Consider cultural and social influences on pain and pain management  - Notify physician/advanced practitioner if interventions unsuccessful or patient reports new pain  Outcome: Progressing     Problem: INFECTION - ADULT  Goal: Absence or prevention of progression during hospitalization  Description: INTERVENTIONS:  - Assess and monitor for signs and symptoms of infection  - Monitor lab/diagnostic results  - Monitor all insertion sites, i e  indwelling lines, tubes, and drains  - Monitor endotracheal if appropriate and nasal secretions for changes in amount and color  - [place] appropriate cooling/warming therapies per order  - Administer medications as ordered  - Instruct and encourage patient and family to use good hand hygiene technique  - Identify and instruct in appropriate isolation precautions for identified infection/condition  Outcome: Progressing  Goal: Absence of fever/infection during neutropenic period  Description: INTERVENTIONS:  - Monitor WBC    Outcome: Progressing     Problem: SAFETY ADULT  Goal: Patient will remain free of falls  Description: INTERVENTIONS:  - Educate patient/family on patient safety including physical limitations  - Instruct patient to call for assistance with activity   - Consult OT/PT to assist with strengthening/mobility   - Keep Call bell within reach  - Keep bed low and locked with side rails adjusted as appropriate  - Keep care items and personal belongings within reach  - Initiate and maintain comfort rounds  - Make Fall Risk Sign visible to staff  - Offer Toileting every 2 Hours, in advance of need  - Initiate/Maintain alarm  - Obtain necessary fall risk management equipment  - Apply yellow socks and bracelet for high fall risk patients  - Consider moving patient to room near nurses station  Outcome: Progressing  Goal: Maintain or return to baseline ADL function  Description: INTERVENTIONS:  -  Assess patient's ability to carry out ADLs; assess patient's baseline for ADL function and identify physical deficits which impact ability to perform ADLs (bathing, care of mouth/teeth, toileting, grooming, dressing, etc )  - Assess/evaluate cause of self-care deficits   - Assess range of motion  - Assess patient's mobility; develop plan if impaired  - Assess patient's need for assistive devices and provide as appropriate  - Encourage maximum independence but intervene and supervise when necessary  - Involve family in performance of ADLs  - Assess for home care needs following discharge   - Consider OT consult to assist with ADL evaluation and planning for discharge  - Provide patient education as appropriate  Outcome: Progressing  Goal: Maintains/Returns to pre admission functional level  Description: INTERVENTIONS:  - Perform BMAT or MOVE assessment daily    - Set and communicate daily mobility goal to care team and patient/family/caregiver  - Collaborate with rehabilitation services on mobility goals if consulted  - Perform Range of Motion 4 times a day  - Reposition patient every 2 hours    - Dangle patient 4 times a day  - Stand patient 4 times a day  - Ambulate patient 4 times a day  - Out of bed to chair 4 times a day   - Out of bed for meals 3 times a day  - Out of bed for toileting  - Record patient progress and toleration of activity level   Outcome: Progressing

## 2022-04-22 NOTE — DISCHARGE SUMMARY
[facility]  Dis[facility]- Moises Sick 1945, 77 y o  male MRN: [id number]  Unit/Bed#: -01 Encounter: [id number]  Primary Care Provider: Quinten Dan DO   Date and time admitted to hospital: 4/19/2022  1:20 AM    * Pneumonia due to Streptococcus pneumoniae [facility])  Assessment & Plan  Patient presented with bilateral pneumonia due to strep pneumonia  He Was treated with IV Unasyn  His fevers resolved and leukocytosis significantly improved:  Decreased to 12,000 from 20,000 on admission  Speech therapy saw the patient and he was felt to be high risk for aspiration because of cervical lymphadenopathy for which patient will see ENT as an outpatient  He is discharged on pureed diet and thin liquids  He is in no respiratory distress on room air  Lungs reveal bilateral inspiratory crackles  He has no JVD or lower extremity edema on day of discharge  Patient is stable for discharge on Augmentin for 4 more days  Discussed case with patient's wife at the bedside in detail on April 22nd    Sepsis [facility])  Assessment & Plan  Patient met criteria for sepsis admission due to strep pneumo pneumonia  Blood culture showed contaminants in 1 set  Sepsis resolved  Patient is not toxic looking on day of discharge  Acute respiratory failure with hypoxia [facility])  Assessment & Plan  Patient had acute hypoxic respiratory failure on admission as evidenced by respiratory more than 24, oxygen saturation of 89% due to bilateral strep pneumo pneumonia  He was treated with oxygen via nasal cannula      Acute hypoxic respiratory failure resolved and his oxygen saturation on room air is normal   Patient did not qualify for home oxygen on day of discharge    Chronic systolic congestive heart failure [facility])  Assessment & Plan  Wt Readings from Last 3 Encounters:   04/22/22 75 kg (165 lb 5 5 oz)   04/18/22 73 3 kg (161 lb 9 6 oz)   04/07/22 73 5 kg (162 lb)     Patient has chronic systolic CHF with ejection fraction 40%  He had no acute systolic CHF on this admission    Continue Toprol and Lasix on discharge            Type 2 diabetes mellitus without complication, without long-term current use of insulin [facility])  Assessment & Plan  Lab Results   Component Value Date    HGBA1C 5 8 (H) 04/19/2022       Recent Labs     04/21/22  1618 04/21/22  2119 04/22/22  0724 04/22/22  1051   POCGLU 169* 138 137 168*       Blood Sugar Average: Last 72 hrs:  · (P) 581 [id number]     Patient has diabetes without complication  A1c is well controlled  Continue metformin on discharge    Paroxysmal atrial fibrillation [facility])  Assessment & Plan  Patient has PAF  He remained in sinus rhythm during hospital stay  Heart rhythm is regular on day of discharge    Continue Toprol      Iron deficiency anemia  Assessment & Plan  Patient has iron deficiency anemia  Denies signs of GI  bleeding  His abdomen soft and nontender on day of discharge    I am discharging patient on iron sulfate    Pharyngeal dysphagia  Assessment & Plan  Patient has pharyngeal dysphagia due to cervical lymphadenopathy  Patient will follow-up with ENT for evaluation of lymphoma  Continue dysphagia diet    Glossitis  Assessment & Plan  Patient complains of a burning feeling in the tongue ever since his code infection last year  Examination patient has erythematous, shiny tongue  He was found to have iron deficiency, Z13 of folic acid levels were normal          Hospital Course:     Nakul Luo is a 77 y o  male patient who originally presented to the hospital on   Admission Orders (From admission, onward)     Ordered        04/19/22 0310  INPATIENT ADMISSION  Once                     due to strep pneumo pneumonia, acute hypoxic respiratory failure    Please see above list of diagnoses and related plan for additional information       Condition at Discharge:  good      Discharge instructions/Information to patient and family:   See after visit summary for information provided to patient and family  Provisions for Follow-Up Care:  See after visit summary for information related to follow-up care and any pertinent home health orders  Disposition:     Home       Discharge Statement:  I spent 35 minutes discharging the patient  This time was spent on the day of discharge  I had direct contact with the patient on the day of discharge  Greater than 50% of the total time was spent examining patient, answering all patient questions, arranging and discussing plan of care with patient as well as directly providing post-discharge instructions  Additional time then spent on discharge activities  Discharge Medications:  See after visit summary for reconciled discharge medications provided to patient and family        ** Please Note: This note has been constructed using a voice recognition system **

## 2022-04-22 NOTE — CASE MANAGEMENT
Case Management Discharge Planning Note    Patient name Alexia Cristina  Location [address] 333/-[id number] MRN [id number]  : 1945 Date 2022       Current Admission Date: 2022  Current Admission Diagnosis:Pneumonia due to Streptococcus pneumoniae [facility])   Patient Active Problem List    Diagnosis Date Noted    Acute respiratory failure with hypoxia ([facility] ) 2022    Glossitis 2022    Chest pain 2022    Mouth ulcers 2021    Iron deficiency anemia 2021    Sepsis ([facility] ) 04/15/2021    Gastroesophageal reflux disease with esophagitis 2021    Roberts's esophagus without dysplasia 2019    Schatzki's ring of distal esophagus 2019    Pharyngeal dysphagia 10/11/2019    Personal history of colon cancer 10/11/2019    Paroxysmal atrial fibrillation (HCC)     Pneumonia due to Streptococcus pneumoniae ([facility] [address] [facility] ) 2019    Leukocytosis 2019    Chronic systolic congestive heart failure ([facility] ) 2019    History of aortic valve replacement with bioprosthetic valve 2019    S/P total knee arthroplasty, left 2019    Essential hypertension 2018    Hx of CABG     Primary localized osteoarthritis of right knee 2018    Multiple myeloma not having achieved remission ([facility] ) 2018    Basal cell carcinoma of skin 2018    Erectile dysfunction 2017    Coronary artery disease involving native heart with angina pectoris, unspecified vessel or lesion type ([facility] ) 10/07/2014    Malignant tumor of cecum ([facility] [facility] ) 2013    Type 2 diabetes mellitus without complication, without long-term current use of insulin ([facility] ) 2012    Fatty liver 2012    Lymphoma ([facility] 2012    Dyslipidemia 2012      LOS (days): 3  Geometric Mean LOS (GMLOS) (days): 4 80  Days to GMLOS:1 4     OBJECTIVE:  Risk of Unplanned Readmission Score: 23      Current admission status: Inpatient   Preferred Pharmacy:   [id number] [place], [address] [address]  Phone: [phone] Fax: [phone]    Primary Care Provider: Joann Estevez DO    Primary Insurance: Vladimir Gibson [facility]  Secondary Insurance:     DISCHARGE DETAILS:     IMM Given (Date):: 04/22/22 (copy provided to patient and media)  IMM Given to[de-identified] Patient

## 2022-04-23 LAB
ALL TARGETS: NOT DETECTED
BACTERIA BLD CULT: ABNORMAL
GRAM STN SPEC: ABNORMAL

## 2022-04-24 LAB — BACTERIA BLD CULT: NORMAL

## 2022-04-25 ENCOUNTER — TRANSITIONAL CARE MANAGEMENT (OUTPATIENT)
Dept: FAMILY MEDICINE CLINIC | Facility: HOSPITAL | Age: 77
End: 2022-04-25

## 2022-04-25 ENCOUNTER — ANESTHESIA EVENT (OUTPATIENT)
Dept: PERIOP | Facility: HOSPITAL | Age: 77
End: 2022-04-25
Payer: COMMERCIAL

## 2022-04-25 NOTE — UTILIZATION REVIEW
Notification of Discharge   This is a Notification of Discharge from our facility [address]  Please be advised that this patient has been discharge from our facility  Below you will find the admission and discharge date and time including the patients disposition  UTILIZATION REVIEW CONTACT:  Kamilah Dye  Utilization   Network Utilization Review Department  Phone: [phone] carefully listen to the prompts  All voicemails are confidential   Email: [email or web address]     PHYSICIAN ADVISORY SERVICES:  FOR [id number] REVIEW - MEDICAL NECESSITY DENIAL  Phone: [phone]  Fax: [phone]  Email: [email or web address]     PRESENTATION DATE: 4/19/2022  1:20 AM  OBERVATION ADMISSION DATE:   INPATIENT ADMISSION DATE: 4/19/22  3:10 AM   DISCHARGE DATE: 4/22/2022  2:30 PM  DISPOSITION: Home/Self Care Home/Self Care      IMPORTANT INFORMATION:  Send all requests for admission clinical reviews, approved or denied determinations and any other requests to dedicated fax number below belonging to the campus where the patient is receiving treatment   List of dedicated fax numbers:  [address] DENIALS (Administrative/Medical Necessity) [phone]   [address] (Maternity/NICU/Pediatrics) [phone]   Susanna Leo [phone]   [address] [facility] [phone]   [address] [place] [address] [phone]   [address] [place] [address] [address] [place] [address],[address] [phone]   [facility]  [phone]   [address] [facility] [address]  [address] [facility] [address] [facility] [phone]

## 2022-04-26 PROBLEM — F17.200 SMOKING: Status: ACTIVE | Noted: 2018-05-25

## 2022-04-26 PROBLEM — IMO0001 SMOKING: Status: ACTIVE | Noted: 2018-05-25

## 2022-04-26 NOTE — ANESTHESIA PREPROCEDURE EVALUATION
Procedure:  DIRECT LARYNGOSCOPY WITH BIOPSY RIGHT PHARYNX, POSSIBLE RIGHT NECK LYMPH NODE BIOPSY; FROZEN SECTION (Right Throat)    Relevant Problems   CARDIO   (+) Chest pain   (+) Chronic systolic congestive heart failure (HCC)   (+) Coronary artery disease involving native heart with angina pectoris, unspecified vessel or lesion type (HCC)   (+) Essential hypertension   (+) History of aortic valve replacement with bioprosthetic valve   (+) Hx of CABG   (+) Paroxysmal atrial fibrillation (HCC)      ENDO   (+) Type 2 diabetes mellitus without complication, without long-term current use of insulin (HCC)      GI/HEPATIC   (+) Fatty liver   (+) Gastroesophageal reflux disease with esophagitis   (+) Malignant tumor of cecum (HCC)   (+) Pharyngeal dysphagia      HEMATOLOGY   (+) Iron deficiency anemia   (+) Lymphoma (HCC)   (+) Multiple myeloma not having achieved remission (HCC)      MUSCULOSKELETAL   (+) Primary localized osteoarthritis of right knee      NEURO/PSYCH   (+) Personal history of colon cancer      PULMONARY  40 pack uyears, stopped in 2009   (+) Acute respiratory failure with hypoxia (HCC)   (+) Pneumonia due to Streptococcus pneumoniae (HCC)   (+) Smoking        Physical Exam    Airway    Mallampati score: II  TM Distance: >3 FB  Neck ROM: full     Dental   upper dentures and lower dentures,     Cardiovascular  Cardiovascular exam normal    Pulmonary  Comment: Crackles at bases,     Other Findings        Anesthesia Plan  ASA Score- 3     Anesthesia Type- general with ASA Monitors  Additional Monitors:   Airway Plan: ETT  Plan Factors-    Chart reviewed  Patient summary reviewed  Induction- intravenous  Postoperative Plan-     Informed Consent- Anesthetic plan and risks discussed with patient  I personally reviewed this patient with the CRNA  Discussed and agreed on the Anesthesia Plan with the CRNA  Ajay Palmer Recent pneumonia   Pt feels good and activity back to normal  O2Sat 96%  Lungs still a few crackles at base  This is not completely elective surgery surgery, discussed with surgery and will go ahead understanding there may be a small increase in risk

## 2022-04-27 ENCOUNTER — ANESTHESIA (OUTPATIENT)
Dept: PERIOP | Facility: HOSPITAL | Age: 77
End: 2022-04-27
Payer: COMMERCIAL

## 2022-04-27 ENCOUNTER — HOSPITAL ENCOUNTER (OUTPATIENT)
Facility: HOSPITAL | Age: 77
Setting detail: OUTPATIENT SURGERY
Discharge: HOME | End: 2022-04-27
Attending: OTOLARYNGOLOGY | Admitting: OTOLARYNGOLOGY
Payer: COMMERCIAL

## 2022-04-27 VITALS
OXYGEN SATURATION: 95 % | TEMPERATURE: 37.4 F | RESPIRATION RATE: 25 BRPM | BODY MASS INDEX: 21.67 KG/M2 | DIASTOLIC BLOOD PRESSURE: 77 MMHG | WEIGHT: 155.4 LBS | HEART RATE: 70 BPM | SYSTOLIC BLOOD PRESSURE: 174 MMHG

## 2022-04-27 DIAGNOSIS — R59.0 CERVICAL LYMPHADENOPATHY: ICD-10-CM

## 2022-04-27 LAB
GLUCOSE SERPL-MCNC: 112 MG/DL (ref 65–140)
GLUCOSE SERPL-MCNC: 123 MG/DL (ref 65–140)

## 2022-04-27 PROCEDURE — 88331 PATH CONSLTJ SURG 1 BLK 1SPC: CPT | Performed by: OTOLARYNGOLOGY

## 2022-04-27 PROCEDURE — 88307 TISSUE EXAM BY PATHOLOGIST: CPT | Performed by: PATHOLOGY

## 2022-04-27 PROCEDURE — 88341 IMHCHEM/IMCYTCHM EA ADD ANTB: CPT | Performed by: PATHOLOGY

## 2022-04-27 PROCEDURE — 31535 LARYNGOSCOPY W/BIOPSY: CPT | Performed by: OTOLARYNGOLOGY

## 2022-04-27 PROCEDURE — 88360 TUMOR IMMUNOHISTOCHEM/MANUAL: CPT | Performed by: PATHOLOGY

## 2022-04-27 PROCEDURE — 88365 INSITU HYBRIDIZATION (FISH): CPT | Performed by: PATHOLOGY

## 2022-04-27 PROCEDURE — 88342 IMHCHEM/IMCYTCHM 1ST ANTB: CPT | Performed by: PATHOLOGY

## 2022-04-27 PROCEDURE — 88331 PATH CONSLTJ SURG 1 BLK 1SPC: CPT | Performed by: PATHOLOGY

## 2022-04-27 PROCEDURE — 82948 REAGENT STRIP/BLOOD GLUCOSE: CPT

## 2022-04-27 PROCEDURE — 88305 TISSUE EXAM BY PATHOLOGIST: CPT | Performed by: PATHOLOGY

## 2022-04-27 RX ORDER — ONDANSETRON 2 MG/ML
4 INJECTION INTRAMUSCULAR; INTRAVENOUS ONCE AS NEEDED
Status: DISCONTINUED | OUTPATIENT
Start: 2022-04-27 | End: 2022-04-27 | Stop reason: HOSPADM

## 2022-04-27 RX ORDER — SODIUM CHLORIDE, SODIUM LACTATE, POTASSIUM CHLORIDE, CALCIUM CHLORIDE 600; 310; 30; 20 MG/100ML; MG/100ML; MG/100ML; MG/100ML
100 INJECTION, SOLUTION INTRAVENOUS CONTINUOUS
Status: DISCONTINUED | OUTPATIENT
Start: 2022-04-27 | End: 2022-04-27 | Stop reason: HOSPADM

## 2022-04-27 RX ORDER — FENTANYL CITRATE 50 UG/ML
INJECTION, SOLUTION INTRAMUSCULAR; INTRAVENOUS AS NEEDED
Status: DISCONTINUED | OUTPATIENT
Start: 2022-04-27 | End: 2022-04-27

## 2022-04-27 RX ORDER — ACETAMINOPHEN 160 MG/5ML
650 SUSPENSION, ORAL (FINAL DOSE FORM) ORAL EVERY 4 HOURS PRN
Status: DISCONTINUED | OUTPATIENT
Start: 2022-04-27 | End: 2022-04-27 | Stop reason: HOSPADM

## 2022-04-27 RX ORDER — ROCURONIUM BROMIDE 10 MG/ML
INJECTION, SOLUTION INTRAVENOUS AS NEEDED
Status: DISCONTINUED | OUTPATIENT
Start: 2022-04-27 | End: 2022-04-27

## 2022-04-27 RX ORDER — FENTANYL CITRATE/PF 50 MCG/ML
25 SYRINGE (ML) INJECTION
Status: DISCONTINUED | OUTPATIENT
Start: 2022-04-27 | End: 2022-04-27 | Stop reason: HOSPADM

## 2022-04-27 RX ORDER — PROPOFOL 10 MG/ML
INJECTION, EMULSION INTRAVENOUS AS NEEDED
Status: DISCONTINUED | OUTPATIENT
Start: 2022-04-27 | End: 2022-04-27

## 2022-04-27 RX ORDER — ONDANSETRON 2 MG/ML
INJECTION INTRAMUSCULAR; INTRAVENOUS AS NEEDED
Status: DISCONTINUED | OUTPATIENT
Start: 2022-04-27 | End: 2022-04-27

## 2022-04-27 RX ORDER — DEXAMETHASONE SODIUM PHOSPHATE 10 MG/ML
INJECTION, SOLUTION INTRAMUSCULAR; INTRAVENOUS AS NEEDED
Status: DISCONTINUED | OUTPATIENT
Start: 2022-04-27 | End: 2022-04-27

## 2022-04-27 RX ORDER — ALBUTEROL SULFATE 90 UG/1
AEROSOL, METERED RESPIRATORY (INHALATION) AS NEEDED
Status: DISCONTINUED | OUTPATIENT
Start: 2022-04-27 | End: 2022-04-27

## 2022-04-27 RX ORDER — LIDOCAINE HYDROCHLORIDE 10 MG/ML
INJECTION, SOLUTION EPIDURAL; INFILTRATION; INTRACAUDAL; PERINEURAL AS NEEDED
Status: DISCONTINUED | OUTPATIENT
Start: 2022-04-27 | End: 2022-04-27

## 2022-04-27 RX ORDER — SODIUM CHLORIDE, SODIUM LACTATE, POTASSIUM CHLORIDE, CALCIUM CHLORIDE 600; 310; 30; 20 MG/100ML; MG/100ML; MG/100ML; MG/100ML
INJECTION, SOLUTION INTRAVENOUS CONTINUOUS PRN
Status: DISCONTINUED | OUTPATIENT
Start: 2022-04-27 | End: 2022-04-27

## 2022-04-27 RX ADMIN — FENTANYL CITRATE 50 MCG: 50 INJECTION, SOLUTION INTRAMUSCULAR; INTRAVENOUS at 08:41

## 2022-04-27 RX ADMIN — PROPOFOL 100 MG: 10 INJECTION, EMULSION INTRAVENOUS at 09:12

## 2022-04-27 RX ADMIN — ALBUTEROL SULFATE 10 PUFF: 90 AEROSOL, METERED RESPIRATORY (INHALATION) at 08:28

## 2022-04-27 RX ADMIN — IPRATROPIUM BROMIDE 0.5 MG: 0.5 SOLUTION RESPIRATORY (INHALATION) at 09:30

## 2022-04-27 RX ADMIN — FENTANYL CITRATE 50 MCG: 50 INJECTION, SOLUTION INTRAMUSCULAR; INTRAVENOUS at 08:26

## 2022-04-27 RX ADMIN — SUGAMMADEX 200 MG: 100 INJECTION, SOLUTION INTRAVENOUS at 09:09

## 2022-04-27 RX ADMIN — ALBUTEROL SULFATE 10 PUFF: 90 AEROSOL, METERED RESPIRATORY (INHALATION) at 09:20

## 2022-04-27 RX ADMIN — ONDANSETRON 4 MG: 2 INJECTION INTRAMUSCULAR; INTRAVENOUS at 08:40

## 2022-04-27 RX ADMIN — LIDOCAINE HYDROCHLORIDE 50 MG: 10 INJECTION, SOLUTION EPIDURAL; INFILTRATION; INTRACAUDAL; PERINEURAL at 08:26

## 2022-04-27 RX ADMIN — SODIUM CHLORIDE, SODIUM LACTATE, POTASSIUM CHLORIDE, AND CALCIUM CHLORIDE: .6; .31; .03; .02 INJECTION, SOLUTION INTRAVENOUS at 08:02

## 2022-04-27 RX ADMIN — ROCURONIUM BROMIDE 30 MG: 10 INJECTION, SOLUTION INTRAVENOUS at 08:26

## 2022-04-27 RX ADMIN — PROPOFOL 100 MG: 10 INJECTION, EMULSION INTRAVENOUS at 08:26

## 2022-04-27 RX ADMIN — DEXAMETHASONE SODIUM PHOSPHATE 10 MG: 10 INJECTION, SOLUTION INTRAMUSCULAR; INTRAVENOUS at 08:40

## 2022-04-27 NOTE — OP NOTE
OPERATIVE REPORT  PATIENT NAME: Grady Knox    :  1945  MRN: [id number]  Pt Location: [facility] OR ROOM 01    SURGERY DATE: 2022    Surgeon(s) and Role:     * Saw Gillis MD - Primary    Preop Diagnosis:  Cervical lymphadenopathy [R59 0]    Post-Op Diagnosis Codes:     * Cervical lymphadenopathy [R59 0]    Procedure(s) (LRB):  DIRECT LARYNGOSCOPY WITH BIOPSY RIGHT PHARYNX, POSSIBLE RIGHT NECK LYMPH NODE BIOPSY; FROZEN SECTION (Right)    Specimen(s):  ID Type Source Tests Collected by Time Destination   1 : mass Right Pharynx Tissue Soft Tissue, Other TISSUE EXAM Saw Gillis MD 2022 4198    2 : Mass Right Pharynx Tissue Soft Tissue, Other TISSUE EXAM Saw Gillis MD 2022 0359    A : Mass Right Pharynx Tissue Soft Tissue, Other LEUKEMIA/LYMPHOMA FLOW CYTOMETRY Saw Gillis MD 2022 0911        Estimated Blood Loss:   Minimal    Drains:  * No LDAs found *    Anesthesia Type:   General    Operative Indications:  Cervical lymphadenopathy [R59 0]  Pharyngeal lesion    Operative Findings:  1 5 cm encapsulated right posterior pharyngeal wall lesion  Significant reduction in bilateral cervical lymphadenopathy    Complications:   None    Procedure and Technique:  Patient is taken to the operating room  He was placed supine the operating table general anesthesia was induced  He was intubated uneventfully  Table then turned 90°  Shoulder roll was placed  A time-out was performed the patient's identity procedure were then confirmed  The laryngoscope was placed into the oral cavity and the right pharyngeal lesion was visualized  Several biopsies were obtained using a cup forceps and sent for frozen section  Frozen section was notable for 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

## 2022-04-27 NOTE — ANESTHESIA POSTPROCEDURE EVALUATION
Post-Op Assessment Note    CV Status:  Stable  Pain Score: 0    Pain management: adequate     Mental Status:  Alert and awake   Hydration Status:  Euvolemic   PONV Controlled:  Controlled   Airway Patency:  Patent      Post Op Vitals Reviewed: Yes      Staff: Anesthesiologist, CRNA         No complications documented      BP   168/74   Temp   97 8   Pulse  80   Resp   24   SpO2   98

## 2022-04-27 NOTE — DISCHARGE INSTRUCTIONS
Direct Laryngoscopy   WHAT YOU SHOULD KNOW:   During a direct laryngoscopy, your caregiver places a scope into your mouth to see directly inside your throat  You may need a direct laryngoscopy to find injuries, growths, tumors, or other problems in your larynx (voice box) or vocal cords  Direct laryngoscopy helps your caregiver diagnose your condition and create a treatment plan  You might also have surgery or other treatments during a direct laryngoscopy  AFTER YOU LEAVE:   Voice care: Your voice may sound hoarse after a laryngoscopy  Try to rest your voice  Speak softly, but do not whisper  Keep conversations short  Do not shout  Follow up with your primary healthcare provider or specialist as directed:  Write down your questions so you remember to ask them during your visits  Medicines:   · Keep a current list of your medicines:  Include the amounts, and when, how, and why you take them  Take the list or the pill bottles to follow-up visits  Carry your medicine list with you in case of an emergency  Throw away old medicine lists  Use vitamins, herbs, or food supplements only as directed  · Take your medicine as directed:  Call your healthcare provider if you think your medicine is not working as expected  Tell him about any medicine allergies, and if you want to quit taking or change your medicine  Nutrition:  Most people eat and drink as usual after a laryngoscopy  Ask your primary healthcare provider if you are not sure  Contact your primary healthcare provider if:  · You have problems breathing or talking  · You see new injuries to your teeth, lips, or tongue  · Your pain does not go away or gets worse  · You have questions about your procedure, medicine, or care      Office Bill Mcgarry

## 2022-05-02 ENCOUNTER — HOSPITAL ENCOUNTER (OUTPATIENT)
Dept: NON INVASIVE DIAGNOSTICS | Age: 77
Discharge: HOME | End: 2022-05-02
Payer: COMMERCIAL

## 2022-05-02 VITALS
WEIGHT: 155 LBS | DIASTOLIC BLOOD PRESSURE: 60 MMHG | HEART RATE: 66 BPM | SYSTOLIC BLOOD PRESSURE: 126 MMHG | HEIGHT: 71 IN | BODY MASS INDEX: 21.7 KG/M2

## 2022-05-02 DIAGNOSIS — I25.10 CORONARY ARTERY DISEASE INVOLVING NATIVE CORONARY ARTERY OF NATIVE HEART WITHOUT ANGINA PECTORIS: ICD-10-CM

## 2022-05-02 LAB
AORTIC ROOT: 3.2 CM
AORTIC VALVE MEAN VELOCITY: 17.2 M/S
APICAL FOUR CHAMBER EJECTION FRACTION: 59 %
ASCENDING AORTA: 3.6 CM (ref 1.96–2.94)
AV AREA BY CONTINUOUS VTI: 1.6 CM2
AV AREA PEAK VELOCITY: 1.7 CM2
AV LVOT MEAN GRADIENT: 4 MMHG
AV LVOT PEAK GRADIENT: 8 MMHG
AV MEAN GRADIENT: 14 MMHG
AV PEAK GRADIENT: 25 MMHG
AV VALVE AREA: 1.64 CM2
AV VELOCITY RATIO: 0.55
DOP CALC AO PEAK VEL: 2.49 M/S
DOP CALC AO VTI: 57.81 CM
DOP CALC LVOT AREA: 3.14 CM2
DOP CALC LVOT DIAMETER: 2 CM
DOP CALC LVOT PEAK VEL VTI: 30.27 CM
DOP CALC LVOT PEAK VEL: 1.38 M/S
DOP CALC LVOT STROKE INDEX: 48.1 ML/M2
DOP CALC LVOT STROKE VOLUME: 95.05 CM3
E WAVE DECELERATION TIME: 205 MS
FRACTIONAL SHORTENING: 25 % (ref 28–44)
INTERVENTRICULAR SEPTUM IN DIASTOLE (PARASTERNAL SHORT AXIS VIEW): 1.4 CM
INTERVENTRICULAR SEPTUM: 1.4 CM (ref 0.52–0.97)
LAAS-AP2: 28.5 CM2
LAAS-AP4: 23.6 CM2
LEFT ATRIUM AREA SYSTOLE SINGLE PLANE A4C: 22.4 CM2
LEFT ATRIUM SIZE: 4.5 CM
LEFT INTERNAL DIMENSION IN SYSTOLE: 3.6 CM (ref 2.61–3.96)
LEFT VENTRICULAR INTERNAL DIMENSION IN DIASTOLE: 4.8 CM (ref 4.27–6.36)
LEFT VENTRICULAR POSTERIOR WALL IN END DIASTOLE: 1.1 CM (ref 0.51–0.96)
LEFT VENTRICULAR STROKE VOLUME: 55 ML
LVSV (TEICH): 55 ML
MV E'TISSUE VEL-SEP: 7 CM/S
MV PEAK E VEL: 141 CM/S
MV STENOSIS PRESSURE HALF TIME: 59 MS
MV VALVE AREA P 1/2 METHOD: 3.73 CM2
RIGHT ATRIUM AREA SYSTOLE A4C: 23.9 CM2
RIGHT VENTRICLE ID DIMENSION: 4.4 CM
SCAN RESULT: NORMAL
SL CV LEFT ATRIUM LENGTH A2C: 6.7 CM
SL CV LV EF: 55
SL CV PED ECHO LEFT VENTRICLE DIASTOLIC VOLUME (MOD BIPLANE) 2D: 110 ML
SL CV PED ECHO LEFT VENTRICLE SYSTOLIC VOLUME (MOD BIPLANE) 2D: 54 ML
TRICUSPID ANNULAR PLANE SYSTOLIC EXCURSION: 1.1 CM
Z-SCORE OF ASCENDING AORTA: 4.68
Z-SCORE OF INTERVENTRICULAR SEPTUM IN END DIASTOLE: 5.67
Z-SCORE OF LEFT VENTRICULAR DIMENSION IN END DIASTOLE: -0.82
Z-SCORE OF LEFT VENTRICULAR DIMENSION IN END SYSTOLE: 0.89
Z-SCORE OF LEFT VENTRICULAR POSTERIOR WALL IN END DIASTOLE: 3.18

## 2022-05-02 PROCEDURE — 93306 TTE W/DOPPLER COMPLETE: CPT

## 2022-05-02 PROCEDURE — 93306 TTE W/DOPPLER COMPLETE: CPT | Performed by: INTERNAL MEDICINE

## 2022-05-10 ENCOUNTER — TELEPHONE (OUTPATIENT)
Dept: CARDIOLOGY CLINIC | Facility: CLINIC | Age: 77
End: 2022-05-10

## 2022-05-14 DIAGNOSIS — J18.9 HCAP (HEALTHCARE-ASSOCIATED PNEUMONIA): ICD-10-CM

## 2022-05-15 RX ORDER — FERROUS SULFATE TAB EC 324 MG (65 MG FE EQUIVALENT) 324 (65 FE) MG
TABLET DELAYED RESPONSE ORAL
Qty: 30 TABLET | Refills: 1 | Status: SHIPPED | OUTPATIENT
Start: 2022-05-15 | End: 2022-06-08

## 2022-06-08 DIAGNOSIS — J18.9 HCAP (HEALTHCARE-ASSOCIATED PNEUMONIA): ICD-10-CM

## 2022-06-08 RX ORDER — FERROUS SULFATE TAB EC 324 MG (65 MG FE EQUIVALENT) 324 (65 FE) MG
TABLET DELAYED RESPONSE ORAL
Qty: 90 TABLET | Refills: 1 | Status: SHIPPED | OUTPATIENT
Start: 2022-06-08 | End: 2022-08-01 | Stop reason: SDUPTHER

## 2022-07-28 LAB
BUN SERPL-MCNC: 12 MG/DL (ref 8–27)
BUN/CREAT SERPL: 13 (ref 10–24)
CALCIUM SERPL-MCNC: 9.2 MG/DL (ref 8.6–10.2)
CHLORIDE SERPL-SCNC: 97 MMOL/L (ref 96–106)
CO2 SERPL-SCNC: 30 MMOL/L (ref 20–29)
CREAT SERPL-MCNC: 0.94 MG/DL (ref 0.76–1.27)
EGFR: 85 ML/MIN/1.73
EST. AVERAGE GLUCOSE BLD GHB EST-MCNC: 143 MG/DL
GLUCOSE SERPL-MCNC: 102 MG/DL (ref 65–99)
HBA1C MFR BLD: 6.6 % (ref 4.8–5.6)
HCV AB S/CO SERPL IA: <0.1 S/CO RATIO (ref 0–0.9)
POTASSIUM SERPL-SCNC: 3.5 MMOL/L (ref 3.5–5.2)
SODIUM SERPL-SCNC: 141 MMOL/L (ref 134–144)
TSH SERPL DL<=0.005 MIU/L-ACNC: 2.24 UIU/ML (ref 0.45–4.5)

## 2022-08-01 ENCOUNTER — OFFICE VISIT (OUTPATIENT)
Dept: FAMILY MEDICINE CLINIC | Facility: HOSPITAL | Age: 77
End: 2022-08-01
Payer: COMMERCIAL

## 2022-08-01 VITALS
TEMPERATURE: 97 F | HEIGHT: 71 IN | HEART RATE: 60 BPM | DIASTOLIC BLOOD PRESSURE: 68 MMHG | BODY MASS INDEX: 22.93 KG/M2 | SYSTOLIC BLOOD PRESSURE: 136 MMHG | WEIGHT: 163.8 LBS

## 2022-08-01 DIAGNOSIS — I10 ESSENTIAL HYPERTENSION: Chronic | ICD-10-CM

## 2022-08-01 DIAGNOSIS — D83.9 CVID (COMMON VARIABLE IMMUNODEFICIENCY) (HCC): ICD-10-CM

## 2022-08-01 DIAGNOSIS — E11.9 TYPE 2 DIABETES MELLITUS WITHOUT COMPLICATION, WITHOUT LONG-TERM CURRENT USE OF INSULIN (HCC): Primary | Chronic | ICD-10-CM

## 2022-08-01 DIAGNOSIS — D50.9 IRON DEFICIENCY ANEMIA, UNSPECIFIED IRON DEFICIENCY ANEMIA TYPE: ICD-10-CM

## 2022-08-01 DIAGNOSIS — J18.9 HCAP (HEALTHCARE-ASSOCIATED PNEUMONIA): ICD-10-CM

## 2022-08-01 DIAGNOSIS — E78.5 DYSLIPIDEMIA: ICD-10-CM

## 2022-08-01 DIAGNOSIS — J13 PNEUMONIA OF BOTH LUNGS DUE TO STREPTOCOCCUS PNEUMONIAE, UNSPECIFIED PART OF LUNG (HCC): ICD-10-CM

## 2022-08-01 PROBLEM — A41.9 SEPSIS (HCC): Status: RESOLVED | Noted: 2021-04-15 | Resolved: 2022-08-01

## 2022-08-01 PROCEDURE — 99214 OFFICE O/P EST MOD 30 MIN: CPT | Performed by: INTERNAL MEDICINE

## 2022-08-01 RX ORDER — FERROUS SULFATE TAB EC 324 MG (65 MG FE EQUIVALENT) 324 (65 FE) MG
324 TABLET DELAYED RESPONSE ORAL
Qty: 90 TABLET | Refills: 1
Start: 2022-08-01 | End: 2022-09-28 | Stop reason: SDUPTHER

## 2022-08-01 NOTE — ASSESSMENT & PLAN NOTE
Resolved with IP tx and abx, saw Heme/Onc and dx with CVID - started IVIG, con't tx and f/u as per specialists

## 2022-08-01 NOTE — ASSESSMENT & PLAN NOTE
Lab Results   Component Value Date    HGBA1C 6 6 (H) 07/27/2022   DM type 2 without complications - controlled with A1C 6 6 - con't current Metformin, urged to watch diet and keep active, recheck BW in 6 mo - order given, on a statin but no ACE/ARB, will follow

## 2022-08-01 NOTE — PROGRESS NOTES
Assessment/Plan:    Type 2 diabetes mellitus without complication, without long-term current use of insulin ([facility])    Lab Results   Component Value Date    HGBA1C 6 6 (H) 07/27/2022   DM type 2 without complications - controlled with A1C 6 6 - con't current Metformin, urged to watch diet and keep active, recheck BW in 6 mo - order given, on a statin but no ACE/ARB, will follow    Essential hypertension  BP at goal by end of appt, con't current meds    Pneumonia due to Streptococcus pneumoniae ([place] [address] )  Resolved with IP tx and abx, saw Heme/Onc and dx with CVID - started IVIG, con't tx and f/u as per specialists    Iron deficiency anemia  Back on iron, check CBC with labs in Jan - order given    CV[facility] (common variable immunodeficiency) [facility] )  Started IVIG with Heme, con't tx/labs and f/u as per specialists    Dyslipidemia  FLP due in Jan - order given, con't current statin for now       Diagnoses and all orders for this visit:    Type 2 diabetes mellitus without complication, without long-term current use of insulin ([place] [address] )  -     Comprehensive metabolic panel; Future  -     Hemoglobin A1C; Future  -     Microalbumin / creatinine urine ratio; Future  -     Comprehensive metabolic panel  -     Hemoglobin A1C  -     Microalbumin / creatinine urine ratio    Essential hypertension    HCAP (healthcare-associated pneumonia)    Pneumonia of both lungs due to Streptococcus pneumoniae, unspecified part of lung (HCC)    CVID (common variable immunodeficiency) (HCC)    Iron deficiency anemia, unspecified iron deficiency anemia type  -     ferrous sulfate 324 (65 Fe) mg; Take 1 tablet (324 mg total) by mouth daily with breakfast  -     CBC and differential; Future  -     CBC and differential    Dyslipidemia  -     Lipid panel; Future  -     Lipid panel        Colonoscopy 1/21 - 5 yrs      Subjective:      Patient ID: Pablo Ramirez is a 77 y o  male      HPI Pt here for follow up appt and BW results    BW results were d/w pt in detail: FBS/A1C 102/6 6, rest of BMP/TSH/Hep C Ab were all normal        Def of controlled vs uncontrolled DM was reviewed  Diet was reviewed - wgt up 4 lbs from Jan 22  He is taking his DM meds as directed  He is overdue for both his DM foot and eye exam  He notes some numbness in both feet at night and has had some dry cracked skin but denies ulcers/sores  He is on statin but no ARB/ACE  Pt was admitted in April for pneumonia and sepsis due strep pneumonia  He has 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 and has started tx for plasmacytoma as well as IVIG for CVID  He is tolerating both tx and only notes some fatigue as SE  He has had no hospitalizations since Friday  He has a CT scan on Friday and a f/u with Heme Onc on 8/11/22  BP a bit abovet goal today on presentation and meds were reviewed and no changes have occurred  He denies missing doses of meds or SE with the meds  He does not check his BP outside the office  She notes no frequent HA's/dizziness/double vision/CP  Review of Systems   Constitutional: Negative for chills and fever  HENT: Negative for congestion and trouble swallowing  Eyes: Negative for pain and visual disturbance  Respiratory: Negative for cough and shortness of breath  Cardiovascular: Negative for chest pain, palpitations and leg swelling  Gastrointestinal: Positive for diarrhea  Negative for abdominal pain, blood in stool, constipation, nausea and vomiting  Genitourinary: Negative for difficulty urinating and dysuria  Musculoskeletal: Negative for arthralgias and myalgias  Skin: Negative for rash and wound  Neurological: Positive for numbness  Negative for dizziness and headaches  Hematological: Does not bruise/bleed easily  Psychiatric/Behavioral: Negative for confusion and dysphoric mood           Objective:    /68   Pulse 60   Temp (!) 97 °F (36 1 °C) (Tympanic)   Ht 5' 11" (1 803 m)   Wt 74 3 kg (163 lb 12 8 oz)   BMI 22 85 kg/m²      Physical Exam  Vitals and nursing note reviewed  Constitutional:       General: He is not in acute distress  Appearance: He is well-developed  He is not ill-appearing  HENT:      Head: Normocephalic and atraumatic  Eyes:      General:         Right eye: No discharge  Left eye: No discharge  Conjunctiva/sclera: Conjunctivae normal    Neck:      Trachea: No tracheal deviation  Cardiovascular:      Rate and Rhythm: Normal rate and regular rhythm  Pulses: no weak pulses          Dorsalis pedis pulses are 2+ on the right side and 2+ on the left side  Heart sounds: Murmur heard  Pulmonary:      Effort: Pulmonary effort is normal  No respiratory distress  Breath sounds: Normal breath sounds  No wheezing, rhonchi or rales  Musculoskeletal:         General: No deformity or signs of injury  Cervical back: Neck supple  Right lower leg: No edema  Left lower leg: No edema  Feet:      Right foot:      Skin integrity: Callus and dry skin present  No ulcer, skin breakdown, erythema or warmth  Left foot:      Skin integrity: Callus and dry skin present  No ulcer, skin breakdown, erythema or warmth  Lymphadenopathy:      Cervical: No cervical adenopathy  Skin:     General: Skin is warm and dry  Coloration: Skin is not pale  Findings: No rash  Neurological:      General: No focal deficit present  Mental Status: He is alert  Mental status is at baseline  Motor: No abnormal muscle tone  Gait: Gait normal    Psychiatric:         Mood and Affect: Mood normal          Behavior: Behavior normal          Thought Content: Thought content normal          Judgment: Judgment normal        Patient's shoes and socks removed  Right Foot/Ankle   Right Foot Inspection  Skin Exam: skin normal, skin intact, dry skin, callus and callus  No warmth, no erythema, no maceration, no abnormal color, no pre-ulcer and no ulcer       Toe Exam: ROM and strength within normal limits  Sensory   Vibration: intact  Monofilament testing: intact    Vascular  The right DP pulse is 2+  Left Foot/Ankle  Left Foot Inspection  Skin Exam: skin normal, skin intact, dry skin and callus  No warmth, no erythema, no maceration, normal color, no pre-ulcer and no ulcer  Toe Exam: ROM and strength within normal limits  Sensory   Vibration: intact  Monofilament testing: intact    Vascular  The left DP pulse is 2+       Assign Risk Category  No deformity present  No loss of protective sensation  No weak pulses  Risk: 0

## 2022-08-02 ENCOUNTER — TELEPHONE (OUTPATIENT)
Dept: ADMINISTRATIVE | Facility: OTHER | Age: 77
End: 2022-08-02

## 2022-08-02 NOTE — LETTER
Diabetic Eye Exam Form    Date Requested: 22  Patient: Virginia Barrera  Patient : 1945   Referring Provider: Yissel Rush DO    DIABETIC Eye Exam Date _______________________________    Type of Exam MUST be documented for Diabetic Eye Exams  Please CHECK ONE  Retinal Exam       Dilated Retinal Exam       OCT       Optomap-Iris Exam      Fundus Photography     Left Eye - Please check Retinopathy AND Type or No Retinopathy      Exam did show retinopathy    Exam did not show retinopathy         Mild     Proliferative           Moderate    Severe            None         Right Eye - Please check Retinopathy AND Type or No Retinopathy     Exam did show retinopathy    Exam did not show retinopathy         Mild     Proliferative        Moderate    Severe        None       Comments __________________________________________________________    Practice Providing Exam ______________________________________________    Exam Performed By (print name) _______________________________________      Provider Signature ___________________________________________________    These reports are needed for  compliance  Please fax this completed form and a copy of the Diabetic Eye Exam report to our office located at [address] [address] as soon as possible via [phone] attention Miriam: Phone [phone]  We thank you for your assistance in treating our mutual patient     (sent to [address]))

## 2022-08-02 NOTE — TELEPHONE ENCOUNTER
Media Tab review:    DOS 03/18/2022 [facility] EYE CARE OPHTHALMOLOGY does not indicate Diabetic retinopathy findings (retinopathy absent/present)  DOS 02/09/2022 [facility] EYE CARE OPHTHALMOLOGY does not indicate Diabetic Eye Exam (Dilated, fundus photography) or findings (retinopathy absent/present)

## 2022-08-02 NOTE — TELEPHONE ENCOUNTER
Upon review of the In Basket request and the patient's chart, initial outreach has been made via fax, please see Contacts section for details       Thank you  Milla Sanchez MA

## 2022-08-02 NOTE — LETTER
Diabetic Eye Exam Form    Date Requested: 08/10/22  Patient: Sena Leiva  Patient : 1945   Referring Provider: Rankin Angelucci, DO    DIABETIC Eye Exam Date _______________________________    Type of Exam MUST be documented for Diabetic Eye Exams  Please CHECK ONE  Retinal Exam       Dilated Retinal Exam       OCT       Optomap-Iris Exam      Fundus Photography     Left Eye - Please check Retinopathy AND Type or No Retinopathy      Exam did show retinopathy    Exam did not show retinopathy         Mild     Proliferative           Moderate    Severe            None         Right Eye - Please check Retinopathy AND Type or No Retinopathy     Exam did show retinopathy    Exam did not show retinopathy         Mild     Proliferative        Moderate    Severe        None       Comments __________________________________________________________    Practice Providing Exam ______________________________________________    Exam Performed By (print name) _______________________________________      Provider Signature ___________________________________________________    These reports are needed for  compliance  Please fax this completed form and a copy of the Diabetic Eye Exam report to our office located at [place] [address] as soon as possible via [phone] attention Miriam: Phone [phone]  We thank you for your assistance in treating our mutual patient     (sent to [address] [address] ([facility]))

## 2022-08-02 NOTE — TELEPHONE ENCOUNTER
----- Message from Juancho Garcia MA sent at 8/1/2022  1:53 PM EDT -----  Regarding: Diabetic Eye Exam  08/01/22 1:53 PM    Hello, our patient Huan Gauthier has had Diabetic Eye Exam completed/performed  Please assist in updating the patient chart by pulling the document from the Media Tab  The date of service is 3/18/22       Thank you,  Juancho Garcia MA  [facility] PRIMARY CARE [address]

## 2022-08-02 NOTE — TELEPHONE ENCOUNTER
Upon review of the In Basket request we have found/obtained the documentation  After careful review of the document we are unable to complete this request for Diabetic Eye Exam because the documentation does not have the proper verbiage (wording) needed to close the requested care gap(s) and the documentation does not have the result(s) needed to close the requested care gap(s)  Any additional questions or concerns should be emailed to the Practice Liaisons via [email or web address] email, please do not reply via In Basket      Thank you  Chantelle Young MA

## 2022-08-04 DIAGNOSIS — I25.10 CORONARY ARTERY DISEASE INVOLVING NATIVE CORONARY ARTERY OF NATIVE HEART WITHOUT ANGINA PECTORIS: ICD-10-CM

## 2022-08-04 RX ORDER — ATORVASTATIN CALCIUM 40 MG/1
TABLET, FILM COATED ORAL
Qty: 90 TABLET | Refills: 3 | Status: SHIPPED | OUTPATIENT
Start: 2022-08-04

## 2022-08-05 NOTE — TELEPHONE ENCOUNTER
As a follow-up, a second attempt has been made for outreach via telephone call, please see Contacts section for details      Thank you  Adi Leiva MA

## 2022-08-10 NOTE — TELEPHONE ENCOUNTER
As a final attempt, a third outreach has been made via fax  Please see Contacts section for details  This encounter will be closed and completed by end of day  Should we receive the requested information because of previous outreach attempts, the requested patient's chart will be updated appropriately       Thank you  Elen Das MA

## 2022-08-15 NOTE — TELEPHONE ENCOUNTER
Upon review of the In Basket request we were able to locate, review, and update the patient chart as requested for Diabetic Eye Exam     Any additional questions or concerns should be emailed to the Practice Liaisons via [email or web address] email, please do not reply via In Basket      Thank you  Luis Velez MA

## 2022-08-18 NOTE — PROGRESS NOTES
Assessment & Plan:      1  Acute respiratory failure with hypoxia ([facility] )     2  Pneumonia of both lungs due to Streptococcus pneumoniae, unspecified part of lung ([facility] )     3  Abnormal chest CT     4  Lung nodule     5  Mediastinal lymphadenopathy     6  Other type of follicular lymphoma, unspecified body region ([facility] )     7  Multiple myeloma not having achieved remission ([facility] )     8  Personal history of COVID-19       · Patient presenting for follow-up  Last seen during April hospitalization for pneumonia  He was referred back to Pulmonary by his oncologist due to recent abnormal PET scan 2 weeks ago demonstrating left lower lobe abnormality, felt likely to be infectious and was treated with antibiotics  · We will have the disc scanned into our records  · He is scheduled for repeat CT chest to follow-up for hopeful resolution  · No longer having any infectious symptoms  No need for additional antibiotics other than what has already been prescribed (Augmentin and azithromycin)  · Not requiring supplemental oxygen and regularly monitors his pulse oximeter  · No requirement for bronchodilators   · If CT chest is unremarkable, patient can likely follow up with us on a p r n  Basis  · Continue close follow-up with oncology and ENT    Subjective:     Patient ID: Chad Colorado is a 77 y o  male  Chief Complaint:  Chad Colorado is a 77y o  year old male presenting for follow-up  Patient denies shortness of breath, coughing, wheezing  No fevers or chills  He did have some yellow sputum production prior to starting antibiotics which has since resolved  He rarely checks his pulse oximeter and states readings are consistently in the high 90s  He does not use inhalers or nebulizer treatments anymore as there was no perceived benefit  Associated symptoms include congestion (resolved after antibiotics)   Pertinent negatives include no chest pain, coughing, fever, headaches, myalgias, nausea, sore throat or vomiting  The following portions of the patient's history were reviewed in this encounter and updated as appropriate: Past medical, social, surgical, family, allergies    Review of Systems   Constitutional: Negative for fever  HENT: Positive for congestion (resolved after antibiotics)  Negative for sore throat  Respiratory: Negative for cough, shortness of breath and wheezing  Cardiovascular: Negative for chest pain  Gastrointestinal: Negative for nausea and vomiting  Musculoskeletal: Negative for myalgias  Skin: Negative for color change  Neurological: Negative for headaches  Psychiatric/Behavioral: Negative for sleep disturbance  All other systems reviewed and are negative  Objective:  Vitals:    08/19/22 1000 08/19/22 1002   BP: 158/80    BP Location: Left arm    Patient Position: Sitting    Cuff Size: Standard    Pulse: 63    Temp: 97 9 °F (36 6 °C)    SpO2:  98%   Weight: 74 8 kg (164 lb 12 8 oz)    Height: 5' 11" (1 803 m)        Physical Exam  Vitals and nursing note reviewed  Constitutional:       General: He is not in acute distress  Appearance: He is well-developed  He is not diaphoretic  HENT:      Head: Normocephalic and atraumatic  Right Ear: External ear normal       Left Ear: External ear normal    Eyes:      General: No scleral icterus  Neck:      Trachea: No tracheal deviation  Cardiovascular:      Rate and Rhythm: Normal rate and regular rhythm  Heart sounds: Normal heart sounds  No murmur heard  No friction rub  No gallop  Pulmonary:      Effort: Pulmonary effort is normal  No respiratory distress  Breath sounds: Normal breath sounds  No stridor  No wheezing  Musculoskeletal:         General: No deformity  Skin:     General: Skin is warm and dry  Neurological:      Mental Status: He is alert and oriented to person, place, and time  Cranial Nerves: No cranial nerve deficit  Motor: No abnormal muscle tone  Psychiatric:         Behavior: Behavior normal          Thought Content:  Thought content normal          Judgment: Judgment normal          Lab Review:   Telephone on 08/02/2022   Component Date Value    Right Eye Diabetic Retin* 03/18/2022 None     Left Eye Diabetic Retino* 03/18/2022 None      Answers for HPI/ROS submitted by the patient on 8/12/2022  Chronicity: chronic  When did you first notice your symptoms?: yesterday  How often do your symptoms occur?: intermittently  Since you first noticed this problem, how has it changed?: unchanged  Do you have shortness of breath that occurs with effort or exertion?: No  Do you have ear congestion?: No  Do you have heartburn?: No  Do you have fatigue?: Yes  Do you have nasal congestion?: No  Do you have shortness of breath when lying flat?: No  Do you have shortness of breath when you wake up?: No  Do you have sweats?: No  Have you experienced weight loss?: No  Which of the following makes your symptoms worse?: URI  Which of the following makes your symptoms better?: nothing  Risk factors for lung disease: travel

## 2022-08-19 ENCOUNTER — OFFICE VISIT (OUTPATIENT)
Dept: PULMONOLOGY | Facility: HOSPITAL | Age: 77
End: 2022-08-19
Payer: COMMERCIAL

## 2022-08-19 VITALS
HEART RATE: 63 BPM | DIASTOLIC BLOOD PRESSURE: 80 MMHG | BODY MASS INDEX: 23.07 KG/M2 | SYSTOLIC BLOOD PRESSURE: 158 MMHG | WEIGHT: 164.8 LBS | HEIGHT: 71 IN | OXYGEN SATURATION: 98 % | TEMPERATURE: 97.9 F

## 2022-08-19 DIAGNOSIS — R91.1 LUNG NODULE: ICD-10-CM

## 2022-08-19 DIAGNOSIS — J13 PNEUMONIA OF BOTH LUNGS DUE TO STREPTOCOCCUS PNEUMONIAE, UNSPECIFIED PART OF LUNG (HCC): ICD-10-CM

## 2022-08-19 DIAGNOSIS — C90.00 MULTIPLE MYELOMA NOT HAVING ACHIEVED REMISSION (HCC): ICD-10-CM

## 2022-08-19 DIAGNOSIS — J96.01 ACUTE RESPIRATORY FAILURE WITH HYPOXIA (HCC): Primary | ICD-10-CM

## 2022-08-19 DIAGNOSIS — C82.80 OTHER TYPE OF FOLLICULAR LYMPHOMA, UNSPECIFIED BODY REGION (HCC): Chronic | ICD-10-CM

## 2022-08-19 DIAGNOSIS — R59.0 MEDIASTINAL LYMPHADENOPATHY: ICD-10-CM

## 2022-08-19 DIAGNOSIS — Z86.16 PERSONAL HISTORY OF COVID-19: ICD-10-CM

## 2022-08-19 DIAGNOSIS — R93.89 ABNORMAL CHEST CT: ICD-10-CM

## 2022-08-19 PROCEDURE — 1160F RVW MEDS BY RX/DR IN RCRD: CPT | Performed by: PHYSICIAN ASSISTANT

## 2022-08-19 PROCEDURE — 99214 OFFICE O/P EST MOD 30 MIN: CPT | Performed by: PHYSICIAN ASSISTANT

## 2022-08-19 PROCEDURE — 3079F DIAST BP 80-89 MM HG: CPT | Performed by: PHYSICIAN ASSISTANT

## 2022-08-19 PROCEDURE — 3077F SYST BP >= 140 MM HG: CPT | Performed by: PHYSICIAN ASSISTANT

## 2022-08-19 RX ORDER — FLUCONAZOLE 100 MG/1
100 TABLET ORAL DAILY
COMMUNITY
Start: 2022-08-04

## 2022-08-19 RX ORDER — DEXAMETHASONE 4 MG/1
TABLET ORAL
COMMUNITY
Start: 2022-08-05

## 2022-08-19 RX ORDER — AMOXICILLIN AND CLAVULANATE POTASSIUM 500; 125 MG/1; MG/1
1 TABLET, FILM COATED ORAL 2 TIMES DAILY
COMMUNITY
Start: 2022-08-11

## 2022-08-19 RX ORDER — MONTELUKAST SODIUM 10 MG/1
10 TABLET ORAL DAILY
COMMUNITY
Start: 2022-08-11

## 2022-08-19 RX ORDER — VALACYCLOVIR HYDROCHLORIDE 500 MG/1
500 TABLET, FILM COATED ORAL DAILY
COMMUNITY
Start: 2022-05-27

## 2022-08-19 RX ORDER — LENALIDOMIDE 15 MG/1
CAPSULE ORAL
COMMUNITY
Start: 2022-08-11

## 2022-08-19 RX ORDER — AZITHROMYCIN 250 MG/1
TABLET, FILM COATED ORAL
COMMUNITY
Start: 2022-08-11

## 2022-08-22 DIAGNOSIS — E11.9 TYPE 2 DIABETES MELLITUS WITHOUT COMPLICATION, WITHOUT LONG-TERM CURRENT USE OF INSULIN (HCC): ICD-10-CM

## 2022-08-22 RX ORDER — METFORMIN HYDROCHLORIDE 500 MG/1
TABLET, EXTENDED RELEASE ORAL
Qty: 90 TABLET | Refills: 2 | Status: SHIPPED | OUTPATIENT
Start: 2022-08-22

## 2022-09-17 DIAGNOSIS — I10 ESSENTIAL HYPERTENSION: ICD-10-CM

## 2022-09-17 DIAGNOSIS — I50.32 CHRONIC DIASTOLIC CONGESTIVE HEART FAILURE (HCC): ICD-10-CM

## 2022-09-17 DIAGNOSIS — I48.0 PAROXYSMAL ATRIAL FIBRILLATION (HCC): ICD-10-CM

## 2022-09-17 DIAGNOSIS — I25.10 CORONARY ARTERY DISEASE INVOLVING NATIVE CORONARY ARTERY OF NATIVE HEART WITHOUT ANGINA PECTORIS: ICD-10-CM

## 2022-09-17 RX ORDER — METOPROLOL SUCCINATE 100 MG/1
TABLET, EXTENDED RELEASE ORAL
Qty: 90 TABLET | Refills: 1 | Status: SHIPPED | OUTPATIENT
Start: 2022-09-17

## 2022-09-28 DIAGNOSIS — D50.9 IRON DEFICIENCY ANEMIA, UNSPECIFIED IRON DEFICIENCY ANEMIA TYPE: ICD-10-CM

## 2022-09-28 DIAGNOSIS — R07.9 CHEST PAIN: ICD-10-CM

## 2022-09-28 RX ORDER — FUROSEMIDE 20 MG/1
20 TABLET ORAL 2 TIMES DAILY
Qty: 180 TABLET | Refills: 0 | Status: SHIPPED | OUTPATIENT
Start: 2022-09-28

## 2022-09-29 RX ORDER — FERROUS SULFATE TAB EC 324 MG (65 MG FE EQUIVALENT) 324 (65 FE) MG
324 TABLET DELAYED RESPONSE ORAL
Qty: 90 TABLET | Refills: 0 | Status: SHIPPED | OUTPATIENT
Start: 2022-09-29

## 2022-10-11 PROBLEM — J13 PNEUMONIA DUE TO STREPTOCOCCUS PNEUMONIAE (HCC): Status: RESOLVED | Noted: 2019-04-22 | Resolved: 2022-10-11

## 2022-10-19 ENCOUNTER — TELEPHONE (OUTPATIENT)
Dept: FAMILY MEDICINE CLINIC | Facility: HOSPITAL | Age: 77
End: 2022-10-19

## 2022-12-07 ENCOUNTER — APPOINTMENT (INPATIENT)
Dept: CT IMAGING | Facility: HOSPITAL | Age: 77
DRG: 178 | End: 2022-12-07
Payer: COMMERCIAL

## 2022-12-07 ENCOUNTER — APPOINTMENT (INPATIENT)
Dept: NON INVASIVE DIAGNOSTICS | Facility: HOSPITAL | Age: 77
DRG: 178 | End: 2022-12-07
Payer: COMMERCIAL

## 2022-12-07 ENCOUNTER — APPOINTMENT (EMERGENCY)
Dept: RADIOLOGY | Facility: HOSPITAL | Age: 77
DRG: 178 | End: 2022-12-07
Payer: COMMERCIAL

## 2022-12-07 ENCOUNTER — HOSPITAL ENCOUNTER (INPATIENT)
Facility: HOSPITAL | Age: 77
LOS: 2 days | Discharge: HOME | DRG: 178 | End: 2022-12-09
Attending: EMERGENCY MEDICINE | Admitting: HOSPITALIST
Payer: COMMERCIAL

## 2022-12-07 DIAGNOSIS — U07.1 SEPSIS DUE TO COVID-19 (HCC): Primary | ICD-10-CM

## 2022-12-07 DIAGNOSIS — R65.20 SEVERE SEPSIS (HCC): ICD-10-CM

## 2022-12-07 DIAGNOSIS — A41.9 SEVERE SEPSIS (HCC): ICD-10-CM

## 2022-12-07 DIAGNOSIS — K21.00 GASTROESOPHAGEAL REFLUX DISEASE WITH ESOPHAGITIS: ICD-10-CM

## 2022-12-07 DIAGNOSIS — K22.2 SCHATZKI'S RING OF DISTAL ESOPHAGUS: ICD-10-CM

## 2022-12-07 DIAGNOSIS — R06.89 ACUTE RESPIRATORY INSUFFICIENCY: ICD-10-CM

## 2022-12-07 DIAGNOSIS — J18.9 PNEUMONIA: ICD-10-CM

## 2022-12-07 DIAGNOSIS — R13.19 ESOPHAGEAL DYSPHAGIA: ICD-10-CM

## 2022-12-07 DIAGNOSIS — A41.89 SEPSIS DUE TO COVID-19 (HCC): Primary | ICD-10-CM

## 2022-12-07 DIAGNOSIS — K22.70 BARRETT'S ESOPHAGUS WITHOUT DYSPLASIA: ICD-10-CM

## 2022-12-07 LAB
2HR DELTA HS TROPONIN: 8 NG/L
4HR DELTA HS TROPONIN: 4 NG/L
ALBUMIN SERPL BCP-MCNC: 3.2 G/DL (ref 3.5–5)
ALP SERPL-CCNC: 139 U/L (ref 46–116)
ALT SERPL W P-5'-P-CCNC: 19 U/L (ref 12–78)
ANION GAP SERPL CALCULATED.3IONS-SCNC: 11 MMOL/L (ref 4–13)
APTT PPP: 26 SECONDS (ref 23–37)
APTT PPP: 32 SECONDS (ref 23–37)
APTT PPP: 86 SECONDS (ref 23–37)
AST SERPL W P-5'-P-CCNC: 14 U/L (ref 5–45)
ATRIAL RATE: 89 BPM
BASOPHILS # BLD AUTO: 0.08 THOUSANDS/ÂΜL (ref 0–0.1)
BASOPHILS NFR BLD AUTO: 1 % (ref 0–1)
BILIRUB SERPL-MCNC: 0.5 MG/DL (ref 0.2–1)
BUN SERPL-MCNC: 11 MG/DL (ref 5–25)
CALCIUM ALBUM COR SERPL-MCNC: 9.3 MG/DL (ref 8.3–10.1)
CALCIUM SERPL-MCNC: 8.7 MG/DL (ref 8.3–10.1)
CARDIAC TROPONIN I PNL SERPL HS: 12 NG/L
CARDIAC TROPONIN I PNL SERPL HS: 16 NG/L
CARDIAC TROPONIN I PNL SERPL HS: 20 NG/L
CHLORIDE SERPL-SCNC: 101 MMOL/L (ref 96–108)
CK SERPL-CCNC: 29 U/L (ref 39–308)
CO2 SERPL-SCNC: 28 MMOL/L (ref 21–32)
CREAT SERPL-MCNC: 1.07 MG/DL (ref 0.6–1.3)
CRP SERPL QL: 26.8 MG/L
D DIMER PPP FEU-MCNC: 1.58 UG/ML FEU
D DIMER PPP FEU-MCNC: 2.16 UG/ML FEU
EOSINOPHIL # BLD AUTO: 0.09 THOUSAND/ÂΜL (ref 0–0.61)
EOSINOPHIL NFR BLD AUTO: 1 % (ref 0–6)
ERYTHROCYTE [DISTWIDTH] IN BLOOD BY AUTOMATED COUNT: 14.7 % (ref 11.6–15.1)
FLUAV RNA RESP QL NAA+PROBE: NEGATIVE
FLUBV RNA RESP QL NAA+PROBE: NEGATIVE
GFR SERPL CREATININE-BSD FRML MDRD: 67 ML/MIN/1.73SQ M
GLUCOSE SERPL-MCNC: 167 MG/DL (ref 65–140)
GLUCOSE SERPL-MCNC: 198 MG/DL (ref 65–140)
GLUCOSE SERPL-MCNC: 205 MG/DL (ref 65–140)
GLUCOSE SERPL-MCNC: 237 MG/DL (ref 65–140)
GLUCOSE SERPL-MCNC: 241 MG/DL (ref 65–140)
HCT VFR BLD AUTO: 38.3 % (ref 36.5–49.3)
HGB BLD-MCNC: 12.8 G/DL (ref 12–17)
IMM GRANULOCYTES # BLD AUTO: 0.09 THOUSAND/UL (ref 0–0.2)
IMM GRANULOCYTES NFR BLD AUTO: 1 % (ref 0–2)
INR PPP: 1.02 (ref 0.84–1.19)
INR PPP: 1.14 (ref 0.84–1.19)
L PNEUMO1 AG UR QL IA.RAPID: NEGATIVE
LACTATE SERPL-SCNC: 1 MMOL/L (ref 0.5–2)
LACTATE SERPL-SCNC: 2.4 MMOL/L (ref 0.5–2)
LYMPHOCYTES # BLD AUTO: 1.13 THOUSANDS/ÂΜL (ref 0.6–4.47)
LYMPHOCYTES NFR BLD AUTO: 9 % (ref 14–44)
MCH RBC QN AUTO: 29.6 PG (ref 26.8–34.3)
MCHC RBC AUTO-ENTMCNC: 33.4 G/DL (ref 31.4–37.4)
MCV RBC AUTO: 89 FL (ref 82–98)
MONOCYTES # BLD AUTO: 1 THOUSAND/ÂΜL (ref 0.17–1.22)
MONOCYTES NFR BLD AUTO: 8 % (ref 4–12)
NEUTROPHILS # BLD AUTO: 10.51 THOUSANDS/ÂΜL (ref 1.85–7.62)
NEUTS SEG NFR BLD AUTO: 80 % (ref 43–75)
NRBC BLD AUTO-RTO: 0 /100 WBCS
NT-PROBNP SERPL-MCNC: 1542 PG/ML
P AXIS: 70 DEGREES
PLATELET # BLD AUTO: 188 THOUSANDS/UL (ref 149–390)
PLATELET # BLD AUTO: 209 THOUSANDS/UL (ref 149–390)
PMV BLD AUTO: 10.1 FL (ref 8.9–12.7)
PMV BLD AUTO: 10.3 FL (ref 8.9–12.7)
POTASSIUM SERPL-SCNC: 3.3 MMOL/L (ref 3.5–5.3)
PR INTERVAL: 160 MS
PROCALCITONIN SERPL-MCNC: 0.11 NG/ML
PROT SERPL-MCNC: 7 G/DL (ref 6.4–8.4)
PROTHROMBIN TIME: 14.2 SECONDS (ref 11.6–14.5)
PROTHROMBIN TIME: 15.4 SECONDS (ref 11.6–14.5)
QRS AXIS: -84 DEGREES
QRSD INTERVAL: 130 MS
QT INTERVAL: 418 MS
QTC INTERVAL: 508 MS
RBC # BLD AUTO: 4.32 MILLION/UL (ref 3.88–5.62)
RSV RNA RESP QL NAA+PROBE: NEGATIVE
S PNEUM AG UR QL: NEGATIVE
SARS-COV-2 RNA RESP QL NAA+PROBE: POSITIVE
SODIUM SERPL-SCNC: 140 MMOL/L (ref 135–147)
T WAVE AXIS: 80 DEGREES
VENTRICULAR RATE: 89 BPM
WBC # BLD AUTO: 12.9 THOUSAND/UL (ref 4.31–10.16)

## 2022-12-07 PROCEDURE — 82948 REAGENT STRIP/BLOOD GLUCOSE: CPT

## 2022-12-07 PROCEDURE — 87449 NOS EACH ORGANISM AG IA: CPT | Performed by: EMERGENCY MEDICINE

## 2022-12-07 PROCEDURE — 99285 EMERGENCY DEPT VISIT HI MDM: CPT

## 2022-12-07 PROCEDURE — 80053 COMPREHEN METABOLIC PANEL: CPT

## 2022-12-07 PROCEDURE — 71275 CT ANGIOGRAPHY CHEST: CPT

## 2022-12-07 PROCEDURE — 93970 EXTREMITY STUDY: CPT

## 2022-12-07 PROCEDURE — 82550 ASSAY OF CK (CPK): CPT | Performed by: INTERNAL MEDICINE

## 2022-12-07 PROCEDURE — 86140 C-REACTIVE PROTEIN: CPT | Performed by: INTERNAL MEDICINE

## 2022-12-07 PROCEDURE — 84484 ASSAY OF TROPONIN QUANT: CPT | Performed by: INTERNAL MEDICINE

## 2022-12-07 PROCEDURE — 85730 THROMBOPLASTIN TIME PARTIAL: CPT

## 2022-12-07 PROCEDURE — 0241U HB NFCT DS VIR RESP RNA 4 TRGT: CPT | Performed by: EMERGENCY MEDICINE

## 2022-12-07 PROCEDURE — 83880 ASSAY OF NATRIURETIC PEPTIDE: CPT | Performed by: EMERGENCY MEDICINE

## 2022-12-07 PROCEDURE — XW033E5 INTRODUCTION OF REMDESIVIR ANTI-INFECTIVE INTO PERIPHERAL VEIN, PERCUTANEOUS APPROACH, NEW TECHNOLOGY GROUP 5: ICD-10-PCS | Performed by: INTERNAL MEDICINE

## 2022-12-07 PROCEDURE — 93010 ELECTROCARDIOGRAM REPORT: CPT | Performed by: INTERNAL MEDICINE

## 2022-12-07 PROCEDURE — 99285 EMERGENCY DEPT VISIT HI MDM: CPT | Performed by: EMERGENCY MEDICINE

## 2022-12-07 PROCEDURE — 93005 ELECTROCARDIOGRAM TRACING: CPT

## 2022-12-07 PROCEDURE — 84145 PROCALCITONIN (PCT): CPT | Performed by: EMERGENCY MEDICINE

## 2022-12-07 PROCEDURE — 85730 THROMBOPLASTIN TIME PARTIAL: CPT | Performed by: INTERNAL MEDICINE

## 2022-12-07 PROCEDURE — 96365 THER/PROPH/DIAG IV INF INIT: CPT

## 2022-12-07 PROCEDURE — 87040 BLOOD CULTURE FOR BACTERIA: CPT

## 2022-12-07 PROCEDURE — 84484 ASSAY OF TROPONIN QUANT: CPT | Performed by: EMERGENCY MEDICINE

## 2022-12-07 PROCEDURE — 85610 PROTHROMBIN TIME: CPT | Performed by: INTERNAL MEDICINE

## 2022-12-07 PROCEDURE — 99223 1ST HOSP IP/OBS HIGH 75: CPT | Performed by: INTERNAL MEDICINE

## 2022-12-07 PROCEDURE — 93970 EXTREMITY STUDY: CPT | Performed by: SURGERY

## 2022-12-07 PROCEDURE — 83605 ASSAY OF LACTIC ACID: CPT

## 2022-12-07 PROCEDURE — 85049 AUTOMATED PLATELET COUNT: CPT | Performed by: INTERNAL MEDICINE

## 2022-12-07 PROCEDURE — 36415 COLL VENOUS BLD VENIPUNCTURE: CPT

## 2022-12-07 PROCEDURE — 85610 PROTHROMBIN TIME: CPT

## 2022-12-07 PROCEDURE — 83605 ASSAY OF LACTIC ACID: CPT | Performed by: STUDENT IN AN ORGANIZED HEALTH CARE EDUCATION/TRAINING PROGRAM

## 2022-12-07 PROCEDURE — 85025 COMPLETE CBC W/AUTO DIFF WBC: CPT

## 2022-12-07 PROCEDURE — 85379 FIBRIN DEGRADATION QUANT: CPT | Performed by: INTERNAL MEDICINE

## 2022-12-07 PROCEDURE — 71045 X-RAY EXAM CHEST 1 VIEW: CPT

## 2022-12-07 PROCEDURE — G1004 CDSM NDSC: HCPCS

## 2022-12-07 RX ORDER — METOPROLOL SUCCINATE 50 MG/1
100 TABLET, EXTENDED RELEASE ORAL DAILY
Status: DISCONTINUED | OUTPATIENT
Start: 2022-12-07 | End: 2022-12-09 | Stop reason: HOSPADM

## 2022-12-07 RX ORDER — ACETAMINOPHEN 325 MG/1
650 TABLET ORAL EVERY 6 HOURS PRN
Status: DISCONTINUED | OUTPATIENT
Start: 2022-12-07 | End: 2022-12-09 | Stop reason: HOSPADM

## 2022-12-07 RX ORDER — FERROUS SULFATE 325(65) MG
325 TABLET ORAL
Status: DISCONTINUED | OUTPATIENT
Start: 2022-12-07 | End: 2022-12-09 | Stop reason: HOSPADM

## 2022-12-07 RX ORDER — POTASSIUM CHLORIDE 20 MEQ/1
40 TABLET, EXTENDED RELEASE ORAL ONCE
Status: COMPLETED | OUTPATIENT
Start: 2022-12-07 | End: 2022-12-07

## 2022-12-07 RX ORDER — HEPARIN SODIUM 5000 [USP'U]/ML
5000 INJECTION, SOLUTION INTRAVENOUS; SUBCUTANEOUS EVERY 8 HOURS SCHEDULED
Status: DISCONTINUED | OUTPATIENT
Start: 2022-12-07 | End: 2022-12-07

## 2022-12-07 RX ORDER — HEPARIN SODIUM 10000 [USP'U]/100ML
3-30 INJECTION, SOLUTION INTRAVENOUS
Status: DISCONTINUED | OUTPATIENT
Start: 2022-12-07 | End: 2022-12-09

## 2022-12-07 RX ORDER — LEVALBUTEROL 1.25 MG/.5ML
1.25 SOLUTION, CONCENTRATE RESPIRATORY (INHALATION) EVERY 6 HOURS PRN
Status: DISCONTINUED | OUTPATIENT
Start: 2022-12-07 | End: 2022-12-09 | Stop reason: HOSPADM

## 2022-12-07 RX ORDER — INSULIN LISPRO 100 [IU]/ML
1-6 INJECTION, SOLUTION INTRAVENOUS; SUBCUTANEOUS
Status: DISCONTINUED | OUTPATIENT
Start: 2022-12-07 | End: 2022-12-09 | Stop reason: HOSPADM

## 2022-12-07 RX ORDER — LEVOFLOXACIN 5 MG/ML
750 INJECTION, SOLUTION INTRAVENOUS EVERY 24 HOURS
Status: DISCONTINUED | OUTPATIENT
Start: 2022-12-07 | End: 2022-12-09 | Stop reason: HOSPADM

## 2022-12-07 RX ORDER — LEVOFLOXACIN 5 MG/ML
750 INJECTION, SOLUTION INTRAVENOUS ONCE
Status: COMPLETED | OUTPATIENT
Start: 2022-12-07 | End: 2022-12-07

## 2022-12-07 RX ORDER — ONDANSETRON 2 MG/ML
4 INJECTION INTRAMUSCULAR; INTRAVENOUS EVERY 6 HOURS PRN
Status: DISCONTINUED | OUTPATIENT
Start: 2022-12-07 | End: 2022-12-09 | Stop reason: HOSPADM

## 2022-12-07 RX ORDER — DEXAMETHASONE SODIUM PHOSPHATE 4 MG/ML
6 INJECTION, SOLUTION INTRA-ARTICULAR; INTRALESIONAL; INTRAMUSCULAR; INTRAVENOUS; SOFT TISSUE EVERY 24 HOURS
Status: DISCONTINUED | OUTPATIENT
Start: 2022-12-07 | End: 2022-12-09 | Stop reason: HOSPADM

## 2022-12-07 RX ORDER — HEPARIN SODIUM 1000 [USP'U]/ML
2800 INJECTION, SOLUTION INTRAVENOUS; SUBCUTANEOUS
Status: DISCONTINUED | OUTPATIENT
Start: 2022-12-07 | End: 2022-12-09

## 2022-12-07 RX ORDER — VALACYCLOVIR HYDROCHLORIDE 500 MG/1
500 TABLET, FILM COATED ORAL DAILY
Status: DISCONTINUED | OUTPATIENT
Start: 2022-12-07 | End: 2022-12-09 | Stop reason: HOSPADM

## 2022-12-07 RX ORDER — HEPARIN SODIUM 1000 [USP'U]/ML
5600 INJECTION, SOLUTION INTRAVENOUS; SUBCUTANEOUS ONCE
Status: COMPLETED | OUTPATIENT
Start: 2022-12-07 | End: 2022-12-07

## 2022-12-07 RX ORDER — PANTOPRAZOLE SODIUM 40 MG/1
40 TABLET, DELAYED RELEASE ORAL
Status: DISCONTINUED | OUTPATIENT
Start: 2022-12-07 | End: 2022-12-09 | Stop reason: HOSPADM

## 2022-12-07 RX ORDER — HEPARIN SODIUM 1000 [USP'U]/ML
5600 INJECTION, SOLUTION INTRAVENOUS; SUBCUTANEOUS
Status: DISCONTINUED | OUTPATIENT
Start: 2022-12-07 | End: 2022-12-09

## 2022-12-07 RX ORDER — FUROSEMIDE 20 MG/1
20 TABLET ORAL 2 TIMES DAILY
Status: DISCONTINUED | OUTPATIENT
Start: 2022-12-07 | End: 2022-12-09 | Stop reason: HOSPADM

## 2022-12-07 RX ORDER — MONTELUKAST SODIUM 10 MG/1
10 TABLET ORAL DAILY
Status: DISCONTINUED | OUTPATIENT
Start: 2022-12-07 | End: 2022-12-09 | Stop reason: HOSPADM

## 2022-12-07 RX ORDER — ASPIRIN 81 MG/1
81 TABLET, CHEWABLE ORAL DAILY
Status: DISCONTINUED | OUTPATIENT
Start: 2022-12-07 | End: 2022-12-09 | Stop reason: HOSPADM

## 2022-12-07 RX ORDER — OMEPRAZOLE 40 MG/1
40 CAPSULE, DELAYED RELEASE ORAL DAILY
Qty: 90 CAPSULE | Refills: 2 | Status: SHIPPED | OUTPATIENT
Start: 2022-12-07

## 2022-12-07 RX ORDER — ATORVASTATIN CALCIUM 40 MG/1
40 TABLET, FILM COATED ORAL DAILY
Status: DISCONTINUED | OUTPATIENT
Start: 2022-12-07 | End: 2022-12-09 | Stop reason: HOSPADM

## 2022-12-07 RX ORDER — BENZONATATE 100 MG/1
100 CAPSULE ORAL 3 TIMES DAILY PRN
Status: DISCONTINUED | OUTPATIENT
Start: 2022-12-07 | End: 2022-12-09 | Stop reason: HOSPADM

## 2022-12-07 RX ADMIN — FERROUS SULFATE TAB 325 MG (65 MG ELEMENTAL FE) 325 MG: 325 (65 FE) TAB at 07:57

## 2022-12-07 RX ADMIN — ATORVASTATIN CALCIUM 40 MG: 40 TABLET, FILM COATED ORAL at 09:46

## 2022-12-07 RX ADMIN — REMDESIVIR 200 MG: 100 INJECTION, POWDER, LYOPHILIZED, FOR SOLUTION INTRAVENOUS at 09:43

## 2022-12-07 RX ADMIN — INSULIN LISPRO 1 UNITS: 100 INJECTION, SOLUTION INTRAVENOUS; SUBCUTANEOUS at 09:49

## 2022-12-07 RX ADMIN — HEPARIN SODIUM 18 UNITS/KG/HR: 10000 INJECTION, SOLUTION INTRAVENOUS at 12:49

## 2022-12-07 RX ADMIN — METOPROLOL SUCCINATE 100 MG: 50 TABLET, EXTENDED RELEASE ORAL at 09:48

## 2022-12-07 RX ADMIN — FUROSEMIDE 20 MG: 20 TABLET ORAL at 17:54

## 2022-12-07 RX ADMIN — INSULIN LISPRO 3 UNITS: 100 INJECTION, SOLUTION INTRAVENOUS; SUBCUTANEOUS at 21:51

## 2022-12-07 RX ADMIN — VALACYCLOVIR HYDROCHLORIDE 500 MG: 500 TABLET, FILM COATED ORAL at 09:56

## 2022-12-07 RX ADMIN — HEPARIN SODIUM 5000 UNITS: 5000 INJECTION, SOLUTION INTRAVENOUS; SUBCUTANEOUS at 07:57

## 2022-12-07 RX ADMIN — MONTELUKAST 10 MG: 10 TABLET, FILM COATED ORAL at 09:49

## 2022-12-07 RX ADMIN — PANTOPRAZOLE SODIUM 40 MG: 40 TABLET, DELAYED RELEASE ORAL at 09:49

## 2022-12-07 RX ADMIN — DEXAMETHASONE SODIUM PHOSPHATE 6 MG: 4 INJECTION, SOLUTION INTRAMUSCULAR; INTRAVENOUS at 09:47

## 2022-12-07 RX ADMIN — INSULIN LISPRO 2 UNITS: 100 INJECTION, SOLUTION INTRAVENOUS; SUBCUTANEOUS at 12:50

## 2022-12-07 RX ADMIN — ASPIRIN 81 MG CHEWABLE TABLET 81 MG: 81 TABLET CHEWABLE at 09:47

## 2022-12-07 RX ADMIN — FUROSEMIDE 20 MG: 20 TABLET ORAL at 09:48

## 2022-12-07 RX ADMIN — LEVOFLOXACIN 750 MG: 750 INJECTION, SOLUTION INTRAVENOUS at 04:41

## 2022-12-07 RX ADMIN — POTASSIUM CHLORIDE 40 MEQ: 1500 TABLET, EXTENDED RELEASE ORAL at 12:54

## 2022-12-07 RX ADMIN — ACETAMINOPHEN 650 MG: 325 TABLET ORAL at 07:57

## 2022-12-07 RX ADMIN — IOHEXOL 85 ML: 350 INJECTION, SOLUTION INTRAVENOUS at 12:17

## 2022-12-07 RX ADMIN — HEPARIN SODIUM 5600 UNITS: 1000 INJECTION INTRAVENOUS; SUBCUTANEOUS at 12:49

## 2022-12-07 RX ADMIN — SODIUM CHLORIDE 500 ML: 0.9 INJECTION, SOLUTION INTRAVENOUS at 05:49

## 2022-12-07 RX ADMIN — LEVOFLOXACIN 750 MG: 5 INJECTION, SOLUTION INTRAVENOUS at 21:53

## 2022-12-07 RX ADMIN — INSULIN LISPRO 3 UNITS: 100 INJECTION, SOLUTION INTRAVENOUS; SUBCUTANEOUS at 17:53

## 2022-12-07 NOTE — ASSESSMENT & PLAN NOTE
· Presented due to SOB, productive cough and fevers at home  Cough has been ongoing for about 1 week  · Currently on 2 L nasal cannula  · Meeting sepsis criteria due to tachycardia, fever 100 6, WBC 12 90  Covid 19 positive   · Lactic 2 4  · Procal wnl  · Chest x-ray with signs of pneumonia  Pending official read   · Covid labs   · CK: Ordered  · BNP: 1542  · Troponin: 12  · CRP: Ordered  · D-dimer: Ordered  If > 0 5 will initiate heparin drip  · ED started patient on IV Levaquin    Continue for now but consider discontinuing if procalcitonin negative x2   · Blood cultures pending  · Start IV remdesivir and dexamethasone

## 2022-12-07 NOTE — H&P
Otto Macias  H&P- Lorrie Myles 1945, 77 y o  male MRN: [id number]  Unit/Bed#: ED 10 Encounter: [id number]  Primary Care Provider: Elvira Stovall DO   Date and time admitted to hospital: 12/7/2022  3:39 AM    * Sepsis due to COVID-19 [facility])  Assessment & Plan  · Presented due to SOB, productive cough and fevers at home  Cough has been ongoing for about 1 week  · Currently on 2 L nasal cannula  · Meeting sepsis criteria due to tachycardia, fever 100 6, WBC 12 90  Covid 19 positive   · Lactic 2 4  · Procal wnl  · Chest x-ray with signs of pneumonia  Pending official read   · Covid labs   · CK: Ordered  · BNP: 1542  · Troponin: 12  · CRP: Ordered  · D-dimer: Ordered  If > 0 5 will initiate heparin drip  · ED started patient on IV Levaquin  Continue for now but consider discontinuing if procalcitonin negative x2   · Blood cultures pending  · Start IV remdesivir and dexamethasone     Acute respiratory failure with hypoxia (HCC)  Assessment & Plan    · Dx with covid  On 2L NC  · Respiratory protocol  · IV dexamethasone and remdesivir  · Wean oxygen as able      Paroxysmal atrial fibrillation (HCC)  Assessment & Plan  · Continue home metoprolol succinate    Not on anticoagulation    Chronic diastolic congestive heart failure (HCC)  Assessment & Plan  Wt Readings from Last 3 Encounters:   12/07/22 74 4 kg (164 lb)   08/19/22 74 8 kg (164 lb 12 8 oz)   08/01/22 74 3 kg (163 lb 12 8 oz)     · Prior echo 5/2/2022: EF 36%, grade 2 diastolic dysfunction  · Monitor I's/O and daily weights  · Continue home Lasix        Multiple myeloma not having achieved remission ([facility] )  Assessment & Plan  · [facility]ce[facility]   Last treatment 1 week ago  · Patient's wife will bring in a list with the chemo medication names  · PET scan scheduled outpatient for 12/12/2022    Type 2 diabetes mellitus without complication, without long-term current use of insulin [facility])  Assessment & Plan  Lab Results   Component Value Date    HGBA1C 6 6 (H) 07/27/2022       No results for input(s): POCGLU in the last 72 hours  Blood Sugar Average: Last 72 hrs:  · Hold home oral hypoglycemics  · SSI    VTE Pharmacologic Prophylaxis: VTE Score: 5 High Risk (Score >/= 5) - Pharmacological DVT Prophylaxis Ordered: heparin  Sequential Compression Devices Ordered  Code Status: Level 1 Full Code   Discussion with family: Updated  (wife) at bedside  Anticipated Length of Stay: Patient will be admitted on an inpatient basis with an anticipated length of stay of greater than 2 midnights secondary to Sepsis due to covid 19  Total Time for Visit, including Counseling / Coordination of Care: 60 minutes Greater than 50% of this total time spent on direct patient counseling and coordination of care  Chief Complaint: SOB    History of Present Illness:  Allyson Vidales is a 77 y o  male with a PMH of PAF, type 2 diabetes, CHF, multiple myeloma who presents from home due to shortness of breath, productive cough and fevers  Productive cough has been ongoing for about 1 week  Fevers and shortness of breath began the night of 12/6  In the ER patient hypoxic  Placed on 2 L nasal cannula  Met sepsis criteria due to fever and elevated WBC  Diagnosed with COVID-19  Prior COVID admission in May 2021  At that time patient had required high flow  Patient currently satting appropriately on 2 L in no respiratory distress  Review of Systems:  Review of Systems   Constitutional: Positive for fever  Negative for fatigue  HENT: Negative for sore throat  Respiratory: Positive for cough and shortness of breath  Negative for chest tightness  Cardiovascular: Negative for chest pain  Gastrointestinal: Negative for abdominal distention, abdominal pain, diarrhea, nausea and vomiting  Genitourinary: Negative for difficulty urinating  Musculoskeletal: Negative for arthralgias  Neurological: Negative for weakness and headaches  Psychiatric/Behavioral: Negative for agitation and behavioral problems  All other systems reviewed and are negative  Past Medical and Surgical History:   Past Medical History:   Diagnosis Date   • Angina pectoris ([place] [address] )    • Arthritis    • Atrial fibrillation [facility])    • Cataract    • Colitis    • Colon polyp    • Coronary artery disease 2014   • Diabetes mellitus ([place] [address] )    • Fatty liver    • GERD (gastroesophageal reflux disease)    • History of chemotherapy    • History of radiation therapy    • History of shingles 2018   • History of transfusion    • Hyperlipidemia    • Hypertension    • 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[address] )    • Pneumonia 08/11/22   • Skin cancer        Past Surgical History:   Procedure Laterality Date   • AORTIC VALVE REPLACEMENT     • CARDIAC VALVE REPLACEMENT  2014    aorta   • CATARACT EXTRACTION Bilateral    • COLECTOMY     • COLONOSCOPY  11/2019    Prior right hemicolectomy   • CORONARY ARTERY BYPASS GRAFT     • DENTAL SURGERY     • JOINT REPLACEMENT  January 2019    left knee   • KNEE SURGERY     • LYMPH NODE BIOPSY  2003   • LYMPHADENECTOMY     • OTHER SURGICAL HISTORY      MAZE PROCEDURE   • RI LARYNGOSCOPY,DIRCT,OP,BIOPSY Right 04/27/2022    Procedure: DIRECT LARYNGOSCOPY WITH BIOPSY RIGHT PHARYNX, POSSIBLE RIGHT NECK LYMPH NODE BIOPSY; FROZEN SECTION;  Surgeon: Luís Jefferson MD;  Location:  MAIN OR;  Service: ENT   • RI TOTAL KNEE ARTHROPLASTY Left 01/28/2019    Procedure: ARTHROPLASTY LEFT KNEE TOTAL;  Surgeon: Bakari Galindo MD;  Location:  MAIN OR;  Service: Orthopedics   • SKIN BIOPSY     • UPPER GASTROINTESTINAL ENDOSCOPY  11/2019    Clementetzki ring   • US GUIDED LYMPH NODE BIOPSY RIGHT  12/14/2021       Meds/Allergies:  Prior to Admission medications    Medication Sig Start Date End Date Taking?  Authorizing Provider   al mag oxide-diphenhydramine-lidocaine viscous (MAGIC MOUTHWASH) 1:1:1 suspension Swish and spit 10 mL every 6 (six) hours as needed for mouth pain or discomfort 4/22/22   Ping Rubio MD   amoxicillin-clavulanate (AUGMENTIN) 500-125 mg per tablet Take 1 tablet by mouth 2 (two) times a day 8/11/22   Historical Provider, MD   aspirin 81 MG tablet Take 1 tablet (81 mg total) by mouth daily 3/29/19   Demetrius Ramirez MD   atorvastatin (LIPITOR) 40 mg tablet TAKE 1 TABLET BY MOUTH EVERY DAY 8/4/22   Demetrius Ramirez MD   azithromycin (ZITHROMAX) 250 mg tablet TAKE 1 TABLET BY MOUTH DAILY X 5 DAYS FOR PNEUMONIA WITH AUGMENTIN ( SEPARATE RX) 8/11/22   Historical Provider, MD   cholecalciferol (VITAMIN D3) 1,000 units tablet Take 2 tablets (2,000 Units total) by mouth daily 4/11/21   Nikia Perez DO   dexamethasone (DECADRON) 4 mg tablet TAKE 5 PILLS (20MG) ONCE A WEEK 8/5/22   Historical Provider, MD   ferrous sulfate 324 (65 Fe) mg Take 1 tablet (324 mg total) by mouth daily with breakfast 9/29/22   Petey Guzman DO   fluconazole (DIFLUCAN) 100 mg tablet Take 100 mg by mouth daily 8/4/22   Historical Provider, MD   furosemide (LASIX) 20 mg tablet Take 1 tablet (20 mg total) by mouth 2 (two) times a day 9/28/22   Petey Guzman DO   metFORMIN (GLUCOPHAGE-XR) 500 mg 24 hr tablet TAKE 1 TABLET BY MOUTH EVERY DAY WITH DINNER 8/22/22   Petey Guzman DO   metoprolol succinate (TOPROL-XL) 100 mg 24 hr tablet TAKE 1 TABLET BY MOUTH EVERY DAY 9/17/22   Petey Guzman DO   montelukast (SINGULAIR) 10 mg tablet Take 10 mg by mouth daily 8/11/22   Historical Provider, MD   multivitamin-minerals (CENTRUM ADULTS) tablet Take 1 tablet by mouth daily 4/13/21   Thomas Aguirre DO   nitroglycerin (NITROSTAT) 0 3 mg SL tablet Nitrostat 0 3 mg sublingual tablet   Place 1 tablet as needed by sublingual route as needed for 90 days  Historical Provider, MD   omeprazole (PriLOSEC) 40 MG capsule TAKE 1 CAPSULE (40 MG TOTAL) BY MOUTH DAILY   12/7/22   Petey Guzman DO   Revlimid 15 MG CAPS  8/11/22   Historical Provider, MD   valACYclovir (VALTREX) 500 mg tablet Take 500 mg by mouth daily 22   Historical Provider, MD   omeprazole (PriLOSEC) 40 MG capsule Take 1 capsule (40 mg total) by mouth daily 3/17/22 12/7/22  Bethany Hobbs DO     I have reviewed home medications with patient personally  Allergies: Allergies   Allergen Reactions   • Ceftin [Cefuroxime] Itching     However, has tolerated Cefazolin and Pip-Tazo since, which have different side chains  Be cautious with / avoid 2nd, 3rd, or 4th Gen Cephs that have similar side chains to Cefuroxime     • Lantus [Insulin Glargine] Itching       Social History:  Marital Status: /Civil Union   Occupation: Unknown  Patient Pre-hospital Living Situation: Home  Patient Pre-hospital Level of Mobility: walks  Patient Pre-hospital Diet Restrictions: Diabetic  Substance Use History:   Social History     Substance and Sexual Activity   Alcohol Use Yes   • Alcohol/week: 2 0 standard drinks   • Types: 2 Cans of beer per week    Comment: very rare "beer here and there"     Social History     Tobacco Use   Smoking Status Former   • Packs/day: 1 00   • Years: 40 00   • Pack years: 40 00   • Types: Cigarettes   • Start date: 47   • Quit date: 2009   • Years since quittin 9   Smokeless Tobacco Never     Social History     Substance and Sexual Activity   Drug Use No       Family History:  Family History   Problem Relation Age of Onset   • Heart block Family    • Coronary artery disease Mother    • Thrombosis Mother    • Hypertension Mother    • Arthritis Mother    • Hyperlipidemia Mother    • Diabetes Father    • Hypertension Father    • Arthritis Father    • Sudden death Father         cardiac   • Colon cancer Paternal Grandfather    • Cancer Paternal Grandfather    • Breast cancer Sister    • Heart disease Brother         Coronary stents       Physical Exam:     Vitals:   Blood Pressure: 118/58 (22 0510)  Pulse: 88 (22 0510)  Temperature: (!) 100 6 °F (38 1 °C) (22 0344)  Temp Source: Oral (12/07/22 0344)  Respirations: 18 (12/07/22 0344)  Height: 5' 11" (180 3 cm) (12/07/22 0344)  Weight - Scale: 74 4 kg (164 lb) (12/07/22 0344)  SpO2: 95 % (12/07/22 0510)    Physical Exam  Vitals and nursing note reviewed  Constitutional:       Appearance: Normal appearance  Interventions: Nasal cannula in place  HENT:      Head: Normocephalic  Eyes:      Extraocular Movements: Extraocular movements intact  Pupils: Pupils are equal, round, and reactive to light  Cardiovascular:      Rate and Rhythm: Normal rate and regular rhythm  Heart sounds: No murmur heard  No gallop  Pulmonary:      Effort: No respiratory distress  Breath sounds: Normal breath sounds  No wheezing  Abdominal:      General: Bowel sounds are normal  There is no distension  Tenderness: There is no abdominal tenderness  Musculoskeletal:         General: Normal range of motion  Cervical back: Normal range of motion  Skin:     General: Skin is warm  Neurological:      General: No focal deficit present  Mental Status: He is alert and oriented to person, place, and time  Mental status is at baseline  Psychiatric:         Mood and Affect: Mood normal          Behavior: Behavior normal          Thought Content:  Thought content normal           Additional Data:     Lab Results:  Results from last 7 days   Lab Units 12/07/22 0418   WBC Thousand/uL 12 90*   HEMOGLOBIN g/dL 12 8   HEMATOCRIT % 38 3   PLATELETS Thousands/uL 209   NEUTROS PCT % 80*   LYMPHS PCT % 9*   MONOS PCT % 8   EOS PCT % 1     Results from last 7 days   Lab Units 12/07/22 0418   SODIUM mmol/L 140   POTASSIUM mmol/L 3 3*   CHLORIDE mmol/L 101   CO2 mmol/L 28   BUN mg/dL 11   CREATININE mg/dL 1 07   ANION GAP mmol/L 11   CALCIUM mg/dL 8 7   ALBUMIN g/dL 3 2*   TOTAL BILIRUBIN mg/dL 0 50   ALK PHOS U/L 139*   ALT U/L 19   AST U/L 14   GLUCOSE RANDOM mg/dL 205*     Results from last 7 days   Lab Units 12/07/22 0418   INR  1 02 Results from last 7 days   Lab Units 12/07/22  0453 12/07/22  0418   LACTIC ACID mmol/L  --  2 4*   PROCALCITONIN ng/ml 0 11  --        Lines/Drains:  Invasive Devices     Peripheral Intravenous Line  Duration           Peripheral IV 12/07/22 Distal;Right;Upper;Ventral (anterior) Antecubital <1 day                    Imaging: Reviewed radiology reports from this admission including: chest xray  XR chest 1 view portable   ED Interpretation by Conor Carranza DO (12/07 0430)   R sided infiltrate            ** Please Note: This note has been constructed using a voice recognition system   **

## 2022-12-07 NOTE — ED TRIAGE NOTES
Pt  Arrives to ED with complaints of SOB tat started tonight  Pt  Reports coughing for a week as well and tonight coughing up blood tinged mucus  Pt  Reports tactile fever  Pt  Currently on chemo for multiple myeloma 
normal (ped)...

## 2022-12-07 NOTE — PLAN OF CARE
Problem: PAIN - ADULT  Goal: Verbalizes/displays adequate comfort level or baseline comfort level  Description: Interventions:  - Encourage patient to monitor pain and request assistance  - Assess pain using appropriate pain scale  - Administer analgesics based on type and severity of pain and evaluate response  - Implement non-pharmacological measures as appropriate and evaluate response  - Consider cultural and social influences on pain and pain management  - Notify physician/advanced practitioner if interventions unsuccessful or patient reports new pain  Outcome: Progressing     Problem: INFECTION - ADULT  Goal: Absence or prevention of progression during hospitalization  Description: INTERVENTIONS:  - Assess and monitor for signs and symptoms of infection  - Monitor lab/diagnostic results  - Monitor all insertion sites, i e  indwelling lines, tubes, and drains  - Monitor endotracheal if appropriate and nasal secretions for changes in amount and color  - [place] appropriate cooling/warming therapies per order  - Administer medications as ordered  - Instruct and encourage patient and family to use good hand hygiene technique  - Identify and instruct in appropriate isolation precautions for identified infection/condition  Outcome: Progressing  Goal: Absence of fever/infection during neutropenic period  Description: INTERVENTIONS:  - Monitor WBC    Outcome: Progressing     Problem: SAFETY ADULT  Goal: Patient will remain free of falls  Description: INTERVENTIONS:  - Educate patient/family on patient safety including physical limitations  - Instruct patient to call for assistance with activity   - Consult OT/PT to assist with strengthening/mobility   - Keep Call bell within reach  - Keep bed low and locked with side rails adjusted as appropriate  - Keep care items and personal belongings within reach  - Initiate and maintain comfort rounds  - Make Fall Risk Sign visible to staff  - Offer Toileting every 2 Hours, in advance of need  - Initiate/Maintain alarm  - Obtain necessary fall risk management equipment: socks  - Apply yellow socks and bracelet for high fall risk patients  - Consider moving patient to room near nurses station  Outcome: Progressing  Goal: Maintain or return to baseline ADL function  Description: INTERVENTIONS:  -  Assess patient's ability to carry out ADLs; assess patient's baseline for ADL function and identify physical deficits which impact ability to perform ADLs (bathing, care of mouth/teeth, toileting, grooming, dressing, etc )  - Assess/evaluate cause of self-care deficits   - Assess range of motion  - Assess patient's mobility; develop plan if impaired  - Assess patient's need for assistive devices and provide as appropriate  - Encourage maximum independence but intervene and supervise when necessary  - Involve family in performance of ADLs  - Assess for home care needs following discharge   - Consider OT consult to assist with ADL evaluation and planning for discharge  - Provide patient education as appropriate  Outcome: Progressing  Goal: Maintains/Returns to pre admission functional level  Description: INTERVENTIONS:  - Perform BMAT or MOVE assessment daily    - Set and communicate daily mobility goal to care team and patient/family/caregiver  - Collaborate with rehabilitation services on mobility goals if consulted  - Perform Range of Motion 2 times a day  - Reposition patient every 2 hours    - Dangle patient 2 times a day  - Stand patient 2 times a day  - Ambulate patient 2 times a day  - Out of bed to chair 2 times a day   - Out of bed for meals 2 times a day  - Out of bed for toileting  - Record patient progress and toleration of activity level   Outcome: Progressing     Problem: DISCHARGE PLANNING  Goal: Discharge to home or other facility with appropriate resources  Description: INTERVENTIONS:  - Identify barriers to discharge w/patient and caregiver  - Arrange for needed discharge resources and transportation as appropriate  - Identify discharge learning needs (meds, wound care, etc )  - Arrange for interpretive services to assist at discharge as needed  - Refer to Case Management Department for coordinating discharge planning if the patient needs post-hospital services based on physician/advanced practitioner order or complex needs related to functional status, cognitive ability, or social support system  Outcome: Progressing     Problem: Knowledge Deficit  Goal: Patient/family/caregiver demonstrates understanding of disease process, treatment plan, medications, and discharge instructions  Description: Complete learning assessment and assess knowledge base    Interventions:  - Provide teaching at level of understanding  - Provide teaching via preferred learning methods  Outcome: Progressing

## 2022-12-07 NOTE — ASSESSMENT & PLAN NOTE
· Dx with covid  On 2L NC  · Respiratory protocol  · IV dexamethasone and remdesivir     · Wean oxygen as able

## 2022-12-07 NOTE — ASSESSMENT & PLAN NOTE
Lab Results   Component Value Date    HGBA1C 6 6 (H) 07/27/2022       No results for input(s): POCGLU in the last 72 hours      Blood Sugar Average: Last 72 hrs:  · Hold home oral hypoglycemics  · SSI

## 2022-12-07 NOTE — ASSESSMENT & PLAN NOTE
Wt Readings from Last 3 Encounters:   12/07/22 74 4 kg (164 lb)   08/19/22 74 8 kg (164 lb 12 8 oz)   08/01/22 74 3 kg (163 lb 12 8 oz)     · Prior echo 5/2/2022: EF 37%, grade 2 diastolic dysfunction  · Monitor I's/O and daily weights  · Continue home Lasix

## 2022-12-07 NOTE — PHYSICAL THERAPY NOTE
Physical Therapy Cancellation Note       12/07/22 1205   PT Last Visit   PT Visit Date 12/07/22   Note Type   Note type Screen   Additional Comments Chart review completed  Spoke with RN who reports that patient has been ambulating independently to/from the bathroom  Spoke with patient who reports that he is at his baseline and has no concerns regarding his functional mobility  Will discharge orders  Clem Taylor

## 2022-12-07 NOTE — RESPIRATORY THERAPY NOTE
RT Protocol Note  Shruthi Espinosa 77 y o  male MRN: [id number]  Unit/Bed#: -01 Encounter: [id number]    Assessment    Principal Problem:    Sepsis due to COVID-19 [facility])  Active Problems:    Type 2 diabetes mellitus without complication, without long-term current use of insulin (HCC)    Multiple myeloma not having achieved remission (HCC)    Chronic diastolic congestive heart failure (HCC)    Paroxysmal atrial fibrillation (HCC)    Acute respiratory failure with hypoxia (HCC)      Home Pulmonary Medications:         Past Medical History:   Diagnosis Date    Angina pectoris ([facility] [address] )     Arthritis     Atrial fibrillation ([facility] [address] )     Cataract     Colitis     Colon polyp     Coronary artery disease     Diabetes mellitus (HCC)     Fatty liver     GERD (gastroesophageal reflux disease)     History of chemotherapy     History of radiation therapy     History of shingles 2018    History of transfusion     Hyperlipidemia     Hypertension     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HCC)     Pneumonia 23    Skin cancer      Social History     Socioeconomic History    Marital status: /Civil Union     Spouse name: None    Number of children: None    Years of education: None    Highest education level: None   Occupational History    Occupation: WORKING FULLTIME    Tobacco Use    Smoking status: Former     Packs/day: 1 00     Years: 40 00     Pack years: 40 00     Types: Cigarettes     Start date:      Quit date: 2009     Years since quittin 9    Smokeless tobacco: Never   Vaping Use    Vaping Use: Never used   Substance and Sexual Activity    Alcohol use:  Yes     Alcohol/week: 2 0 standard drinks     Types: 2 Cans of beer per week     Comment: very rare "beer here and there"    Drug use: No    Sexual activity: Not Currently     Partners: Female     Birth control/protection: None   Other Topics Concern    None   Social History Narrative    None     Social Determinants of Health     Financial Resource Strain: Not on file   Food Insecurity: No Food Insecurity    Worried About Running Out of Food in the Last Year: Never true    Ran Out of Food in the Last Year: Never true   Transportation Needs: No Transportation Needs    Lack of Transportation (Medical): No    Lack of Transportation (Non-Medical): No   Physical Activity: Not on file   Stress: Not on file   Social Connections: Not on file   Intimate Partner Violence: Not on file   Housing Stability: High Risk    Unable to Pay for Housing in the Last Year: Yes    Number of Places Lived in the Last Year: 1    Unstable Housing in the Last Year: No       Subjective         Objective    Physical Exam:   Assessment Type: Assess only  General Appearance: Awake  Respiratory Pattern: Normal    Vitals:  Blood pressure 131/61, pulse 77, temperature 100 2 °F (37 9 °C), resp  rate (!) 24, height 5' 11" (1 803 m), weight 74 4 kg (164 lb), SpO2 94 %            Imaging and other studies:           Plan    Respiratory Plan: Mild Distress pathway

## 2022-12-07 NOTE — ED PROVIDER NOTES
History  Chief Complaint   Patient presents with   • Shortness of Breath     29-year-old male currently on active chemotherapy for multiple myeloma most recent dose 1 week ago at [facility] presents for evaluation of fever, shortness of breath, productive cough with blood-tinged sputum  States that he had a cough for approximately 1 week but developed the rest of the symptoms this evening  No chest pain he does report shortness of breath  No orthopnea, no lower extremity swelling  On chart review it appears the patient has CHF with EF of 40% and global diffuse hypokinesis no no previous history of heart attacks  He does comply with his Lasix daily, not report any recent weight increase  No sick contacts  History of DVT or PE          Prior to Admission Medications   Prescriptions Last Dose Informant Patient Reported? Taking?    Revlimid 15 MG CAPS   Yes No   al mag oxide-diphenhydramine-lidocaine viscous (MAGIC MOUTHWASH) 1:1:1 suspension   No No   Sig: Swish and spit 10 mL every 6 (six) hours as needed for mouth pain or discomfort   amoxicillin-clavulanate (AUGMENTIN) 500-125 mg per tablet   Yes No   Sig: Take 1 tablet by mouth 2 (two) times a day   aspirin 81 MG tablet   Yes No   Sig: Take 1 tablet (81 mg total) by mouth daily   atorvastatin (LIPITOR) 40 mg tablet   No No   Sig: TAKE 1 TABLET BY MOUTH EVERY DAY   azithromycin (ZITHROMAX) 250 mg tablet   Yes No   Sig: TAKE 1 TABLET BY MOUTH DAILY X 5 DAYS FOR PNEUMONIA WITH AUGMENTIN ( SEPARATE RX)   cholecalciferol (VITAMIN D3) 1,000 units tablet   No No   Sig: Take 2 tablets (2,000 Units total) by mouth daily   dexamethasone (DECADRON) 4 mg tablet   Yes No   Sig: TAKE 5 PILLS (20MG) ONCE A WEEK   ferrous sulfate 324 (65 Fe) mg   No No   Sig: Take 1 tablet (324 mg total) by mouth daily with breakfast   fluconazole (DIFLUCAN) 100 mg tablet   Yes No   Sig: Take 100 mg by mouth daily   furosemide (LASIX) 20 mg tablet   No No   Sig: Take 1 tablet (20 mg total) by mouth 2 (two) times a day   metFORMIN (GLUCOPHAGE-XR) 500 mg 24 hr tablet   No No   Sig: TAKE 1 TABLET BY MOUTH EVERY DAY WITH DINNER   metoprolol succinate (TOPROL-XL) 100 mg 24 hr tablet   No No   Sig: TAKE 1 TABLET BY MOUTH EVERY DAY   montelukast (SINGULAIR) 10 mg tablet   Yes No   Sig: Take 10 mg by mouth daily   multivitamin-minerals (CENTRUM ADULTS) tablet   No No   Sig: Take 1 tablet by mouth daily   nitroglycerin (NITROSTAT) 0 3 mg SL tablet   Yes No   Sig: Nitrostat 0 3 mg sublingual tablet   Place 1 tablet as needed by sublingual route as needed for 90 days  omeprazole (PriLOSEC) 40 MG capsule   No No   Sig: TAKE 1 CAPSULE (40 MG TOTAL) BY MOUTH DAILY     valACYclovir (VALTREX) 500 mg tablet   Yes No   Sig: Take 500 mg by mouth daily      Facility-Administered Medications: None       Past Medical History:   Diagnosis Date   • Angina pectoris ([place] [id number] )    • Arthritis    • Atrial fibrillation [facility])    • Cataract    • Colitis    • Colon polyp    • Coronary artery disease 2014   • Diabetes mellitus ([place] [address] )    • Fatty liver    • GERD (gastroesophageal reflux disease)    • History of chemotherapy    • History of radiation therapy    • History of shingles 2018   • History of transfusion    • Hyperlipidemia    • Hypertension    • 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[place] [address] )    • Pneumonia 08/11/22   • Skin cancer        Past Surgical History:   Procedure Laterality Date   • AORTIC VALVE REPLACEMENT     • CARDIAC VALVE REPLACEMENT  2014    aorta   • CATARACT EXTRACTION Bilateral    • COLECTOMY     • COLONOSCOPY  11/2019    Prior right hemicolectomy   • CORONARY ARTERY BYPASS GRAFT     • DENTAL SURGERY     • JOINT REPLACEMENT  January 2019    left knee   • KNEE SURGERY     • LYMPH NODE BIOPSY  2003   • LYMPHADENECTOMY     • OTHER SURGICAL HISTORY      MAZE PROCEDURE   • IA LARYNGOSCOPY,DIRCT,OP,BIOPSY Right 04/27/2022    Procedure: DIRECT LARYNGOSCOPY WITH BIOPSY RIGHT PHARYNX, POSSIBLE RIGHT NECK LYMPH NODE BIOPSY; FROZEN SECTION; Surgeon: Adina Villafana MD;  Location:  MAIN OR;  Service: ENT   • ME TOTAL KNEE ARTHROPLASTY Left 2019    Procedure: ARTHROPLASTY LEFT KNEE TOTAL;  Surgeon: Prema Flores MD;  Location:  MAIN OR;  Service: Orthopedics   • SKIN BIOPSY     • UPPER GASTROINTESTINAL ENDOSCOPY  2019    Cricket ring   • US GUIDED LYMPH NODE BIOPSY RIGHT  2021       Family History   Problem Relation Age of Onset   • Heart block Family    • Coronary artery disease Mother    • Thrombosis Mother    • Hypertension Mother    • Arthritis Mother    • Hyperlipidemia Mother    • Diabetes Father    • Hypertension Father    • Arthritis Father    • Sudden death Father         cardiac   • Colon cancer Paternal Grandfather    • Cancer Paternal Grandfather    • Breast cancer Sister    • Heart disease Brother         Coronary stents     I have reviewed and agree with the history as documented  E-Cigarette/Vaping   • E-Cigarette Use Never User      E-Cigarette/Vaping Substances   • Nicotine No    • THC No    • CBD No    • Flavoring No    • Other No    • Unknown No      Social History     Tobacco Use   • Smoking status: Former     Packs/day: 1 00     Years: 40 00     Pack years: 40 00     Types: Cigarettes     Start date: 47     Quit date: 2009     Years since quittin 9   • Smokeless tobacco: Never   Vaping Use   • Vaping Use: Never used   Substance Use Topics   • Alcohol use: Yes     Alcohol/week: 2 0 standard drinks     Types: 2 Cans of beer per week     Comment: very rare "beer here and there"   • Drug use: No       Review of Systems   Constitutional: Positive for fever  Negative for appetite change and chills  HENT: Negative for rhinorrhea and sore throat  Eyes: Negative for photophobia and visual disturbance  Respiratory: Positive for cough and shortness of breath  Cardiovascular: Negative for chest pain and palpitations  Gastrointestinal: Negative for abdominal pain and diarrhea     Genitourinary: Negative for dysuria, frequency and urgency  Skin: Negative for rash  Neurological: Negative for dizziness and weakness  All other systems reviewed and are negative  Physical Exam  Physical Exam  Vitals and nursing note reviewed  Constitutional:       Appearance: He is well-developed  HENT:      Head: Normocephalic and atraumatic  Right Ear: External ear normal       Left Ear: External ear normal    Eyes:      Conjunctiva/sclera: Conjunctivae normal       Pupils: Pupils are equal, round, and reactive to light  Neck:      Vascular: No JVD  Trachea: No tracheal deviation  Cardiovascular:      Rate and Rhythm: Normal rate and regular rhythm  Heart sounds: Normal heart sounds  No murmur heard  No friction rub  No gallop  Pulmonary:      Effort: Pulmonary effort is normal  No respiratory distress  Breath sounds: No stridor  Examination of the right-upper field reveals rales  Examination of the left-upper field reveals rales  Examination of the right-middle field reveals rales  Examination of the left-middle field reveals rales  Examination of the right-lower field reveals rales  Examination of the left-lower field reveals rales  Rales present  No wheezing  Abdominal:      General: There is no distension  Palpations: Abdomen is soft  There is no mass  Tenderness: There is no abdominal tenderness  There is no guarding or rebound  Musculoskeletal:         General: Normal range of motion  Cervical back: Normal range of motion and neck supple  Skin:     General: Skin is warm and dry  Coloration: Skin is not pale  Findings: No erythema or rash  Neurological:      Mental Status: He is alert and oriented to person, place, and time  Cranial Nerves: No cranial nerve deficit           Vital Signs  ED Triage Vitals [12/07/22 0344]   Temperature Pulse Respirations Blood Pressure SpO2   (!) 100 6 °F (38 1 °C) 99 18 161/72 92 %      Temp Source Heart Rate Source Patient Position - Orthostatic VS BP Location FiO2 (%)   Oral -- -- -- --      Pain Score       --           Vitals:    12/07/22 0344   BP: 161/72   Pulse: 99         Visual Acuity      ED Medications  Medications   levofloxacin (LEVAQUIN) IVPB (premix in dextrose) 750 mg 150 mL (750 mg Intravenous New Bag 12/7/22 0441)   sodium chloride 0 9 % bolus 500 mL (has no administration in time range)       Diagnostic Studies  Results Reviewed     Procedure Component Value Units Date/Time    Procalcitonin [[id number]]  (Normal) Collected: 12/07/22 0453    Lab Status: Final result Specimen: Blood from Arm, Right Updated: 12/07/22 0518     Procalcitonin 0 11 ng/ml     FLU/RSV/COVID - if FLU/RSV clinically relevant [[id number]]  (Abnormal) Collected: 12/07/22 0427    Lab Status: Final result Specimen: Nares from Nose Updated: 12/07/22 0517     SARS-CoV-2 Positive     INFLUENZA A PCR Negative     INFLUENZA B PCR Negative     RSV PCR Negative    Narrative:      FOR PEDIATRIC PATIENTS - copy/paste COVID Guidelines URL to browser: [email or web address]    SARS-CoV-2 assay is a Nucleic Acid Amplification assay intended for the  qualitative detection of nucleic acid from SARS-CoV-2 in nasopharyngeal  swabs  Results are for the presumptive identification of SARS-CoV-2 RNA  Positive results are indicative of infection with SARS-CoV-2, the virus  causing COVID-19, but do not rule out bacterial infection or co-infection  with other viruses  Laboratories within the [place] and its  territories are required to report all positive results to the appropriate  public health authorities  Negative results do not preclude SARS-CoV-2  infection and should not be used as the sole basis for treatment or other  patient management decisions  Negative results must be combined with  clinical observations, patient history, and epidemiological information    This test has not been FDA cleared or approved  This test has been authorized by FDA under an Emergency Use Authorization  (EUA)  This test is only authorized for the duration of time the  declaration that circumstances exist justifying the authorization of the  emergency use of an in vitro diagnostic tests for detection of SARS-CoV-2  virus and/or diagnosis of COVID-19 infection under section 564(b)(1) of  the Act, 21 U  S C  995XQE-8(L)(6), unless the authorization is terminated  or revoked sooner  The test has been validated but independent review by FDA  and CLIA is pending  Test performed using [employer] GeneXpert: This RT-PCR assay targets N2,  a region unique to SARS-CoV-2  A conserved region in the E-gene was chosen  for pan-Sarbecovirus detection which includes SARS-CoV-2  According to CMS-2020-01-R, this platform meets the definition of high-throughput technology      APTT [[id number]]  (Normal) Collected: 12/07/22 0418    Lab Status: Final result Specimen: Blood from Arm, Right Updated: 12/07/22 0515     PTT 26 seconds     Protime-INR [[id number]]  (Normal) Collected: 12/07/22 0418    Lab Status: Final result Specimen: Blood from Arm, Right Updated: 12/07/22 0515     Protime 14 2 seconds      INR 1 02    Comprehensive metabolic panel [[id number]]  (Abnormal) Collected: 12/07/22 0418    Lab Status: Final result Specimen: Blood from Arm, Right Updated: 12/07/22 0501     Sodium 140 mmol/L      Potassium 3 3 mmol/L      Chloride 101 mmol/L      CO2 28 mmol/L      ANION GAP 11 mmol/L      BUN 11 mg/dL      Creatinine 1 07 mg/dL      Glucose 205 mg/dL      Calcium 8 7 mg/dL      Corrected Calcium 9 3 mg/dL      AST 14 U/L      ALT 19 U/L      Alkaline Phosphatase 139 U/L      Total Protein 7 0 g/dL      Albumin 3 2 g/dL      Total Bilirubin 0 50 mg/dL      eGFR 67 ml/min/1 73sq m     Narrative:      [place] guidelines for Chronic Kidney Disease (CKD):   •  Stage 1 with normal or high GFR (GFR > 90 mL/min/1 73 square meters)  •  Stage 2 Mild CKD (GFR = 60-89 mL/min/1 73 square meters)  •  Stage 3A Moderate CKD (GFR = 45-59 mL/min/1 73 square meters)  •  Stage 3B Moderate CKD (GFR = 30-44 mL/min/1 73 square meters)  •  Stage 4 Severe CKD (GFR = 15-29 mL/min/1 73 square meters)  •  Stage 5 End Stage CKD (GFR <15 mL/min/1 73 square meters)  Note: GFR calculation is accurate only with a steady state creatinine    Lactic acid [[id number]]  (Abnormal) Collected: 12/07/22 0418    Lab Status: Final result Specimen: Blood from Arm, Right Updated: 12/07/22 0501     LACTIC ACID 2 4 mmol/L     Narrative:      Result may be elevated if tourniquet was used during collection      Lactic acid 2 Hours [[id number]]     Lab Status: No result Specimen: Blood     HS Troponin 0hr (reflex protocol) [[id number]]  (Normal) Collected: 12/07/22 0418    Lab Status: Final result Specimen: Blood from Arm, Right Updated: 12/07/22 0457     hs TnI 0hr 12 ng/L     HS Troponin I 2hr [[id number]]     Lab Status: No result Specimen: Blood     NT-BNP PRO [[id number]]  (Abnormal) Collected: 12/07/22 0418    Lab Status: Final result Specimen: Blood from Arm, Right Updated: 12/07/22 0457     NT-proBNP 1,542 pg/mL     CBC and differential [[id number]]  (Abnormal) Collected: 12/07/22 0418    Lab Status: Final result Specimen: Blood from Arm, Right Updated: 12/07/22 0435     WBC 12 90 Thousand/uL      RBC 4 32 Million/uL      Hemoglobin 12 8 g/dL      Hematocrit 38 3 %      MCV 89 fL      MCH 29 6 pg      MCHC 33 4 g/dL      RDW 14 7 %      MPV 10 3 fL      Platelets 802 Thousands/uL      nRBC 0 /100 WBCs      Neutrophils Relative 80 %      Immat GRANS % 1 %      Lymphocytes Relative 9 %      Monocytes Relative 8 %      Eosinophils Relative 1 %      Basophils Relative 1 %      Neutrophils Absolute 10 51 Thousands/µL      Immature Grans Absolute 0 09 Thousand/uL      Lymphocytes Absolute 1 13 Thousands/µL      Monocytes Absolute 1 00 Thousand/µL      Eosinophils Absolute 0 09 Thousand/µL      Basophils Absolute 0 08 Thousands/µL     Blood culture #2 [[id number]] Collected: 12/07/22 0418    Lab Status: In process Specimen: Blood from Arm, Right Updated: 12/07/22 0434    Blood culture #1 [[id number]] Collected: 12/07/22 0418    Lab Status: In process Specimen: Blood from Arm, Right Updated: 12/07/22 0434    Legionella antigen, urine [[id number]]     Lab Status: No result Specimen: Urine     Strep Pneumoniae, Urine [[id number]]     Lab Status: No result Specimen: Urine     Sputum culture and Gram stain [[id number]]     Lab Status: No result Specimen: Sputum     UA w Reflex to Microscopic w Reflex to Culture [[id number]]     Lab Status: No result Specimen: Urine                  XR chest 1 view portable   ED Interpretation by Janay Rogel DO (12/07 0430)   R sided infiltrate                 Procedures  Procedures         ED Course  ED Course as of 12/07/22 0534   Wed Dec 07, 2022   0500 NT-proBNP(!): 1,542  improved from previous   0506 Procedure Note: EKG  Date/Time: 12/07/22 5:06 AM   Performed by: July Mckeon  Authorized by: July Mckeon  Indications / Diagnosis: SOB  ECG reviewed by me, the ED Provider: yes   The EKG demonstrates:  Rhythm: normal sinus  Intervals: normal intervals  Axis: normal axis  QRS/Blocks: normal QRS  ST Changes:No acute ST Changes, no STD/MIGUEL ANGEL  Initial Sepsis Screening     Row Name 12/07/22 0502                Is the patient's history suggestive of a new or worsening infection? Yes (Proceed)  -EB        Suspected source of infection pneumonia  -EB        Are two or more of the following signs & symptoms of infection both present and new to the patient?  Yes (Proceed)  -EB        Indicate SIRS criteria Tachycardia > 90 bpm;Leukocytosis (WBC > 50510 IJL)  -EB        If the answer is yes to both questions, suspicion of sepsis is present --        If severe sepsis is present AND tissue hypoperfusion perists in the hour after fluid resuscitation or lactate > 4, the patient meets criteria for SEPTIC SHOCK --        Are any of the following organ dysfunction criteria present within 6 hours of suspected infection and SIRS criteria that are NOT considered to be chronic conditions? Yes  -EB        Organ dysfunction Lactate > 2 0 mmol/L  -EB        Date of presentation of severe sepsis 12/07/22  -EB        Time of presentation of severe sepsis 0502  -EB        Tissue hypoperfusion persists in the hour after crystalloid fluid administration, evidenced, by either: --        Was hypotension present within one hour of the conclusion of crystalloid fluid administration?  No  -EB        Date of presentation of septic shock --        Time of presentation of septic shock --              User Key  (r) = Recorded By, (t) = Taken By, (c) = Cosigned By    [address] Name Provider Type    EB Roshan Goodness, DO Physician                              MDM  Number of Diagnoses or Management Options  Acute respiratory insufficiency: new and requires workup  Pneumonia: new and requires workup  Severe sepsis [facility]): new and requires workup  Diagnosis management comments: 81-year-old male with shortness of breath, fever, active chemotherapy will obtain lab work, imaging will reevaluate       Amount and/or Complexity of Data Reviewed  Clinical lab tests: ordered and reviewed  Tests in the radiology section of CPT®: ordered and reviewed  Tests in the medicine section of CPT®: ordered and reviewed  Decide to obtain previous medical records or to obtain history from someone other than the patient: yes  Review and summarize past medical records: yes  Independent visualization of images, tracings, or specimens: yes        Disposition  Final diagnoses:   Acute respiratory insufficiency   Pneumonia   Severe sepsis ([address] )     Time reflects when diagnosis was documented in both MDM as applicable and the Disposition within this note     Time User Action Codes Description Comment    12/7/2022  4:02 AM Scot Andre Panfilo Brooksabi Add [R06 89] Acute respiratory insufficiency     12/7/2022  4:19 AM Jesus Mireles [J18 9] Pneumonia     12/7/2022  5:06 AM Jesus Mireles [A41 9,  R65 20] Severe sepsis [facility])       ED Disposition     ED Disposition   Admit    Condition   Stable    Date/Time   Wed Dec 7, 2022  5:07 AM    Comment   Case was discussed with WINTER and the patient's admission status was agreed to be Admission Status: inpatient status to the service of Dr Daniel Barroso   Follow-up Information    None         Patient's Medications   Discharge Prescriptions    No medications on file       No discharge procedures on file      PDMP Review       Value Time User    PDMP Reviewed  Yes 4/10/2021 12:23 PM Susana Arevalo DO          ED Provider  Electronically Signed by           Maddy Bojorquez DO  12/07/22 0857

## 2022-12-07 NOTE — ED NOTES
This RN contacted laboratory in an attempt to resolve duplicate orders for lab work and make sure that provider orders are being tested as needed     Nava Stanley RN  12/07/22 5042

## 2022-12-07 NOTE — SEPSIS NOTE
Sepsis Note   Vickie Gonzalez 77 y o  male MRN: [id number]  Unit/Bed#: ED 10 Encounter: [id number]       qSOFA     [address] Name 12/07/22 0411 12/07/22 0344             Altered mental status GCS < 15 0 --       Respiratory Rate > / =22 -- 0       Systolic BP < / =128 -- 0       Q Sofa Score 0 0                  Initial Sepsis Screening     Row Name 12/07/22 0502                Is the patient's history suggestive of a new or worsening infection? Yes (Proceed)  -EB        Suspected source of infection pneumonia  -EB        Are two or more of the following signs & symptoms of infection both present and new to the patient? Yes (Proceed)  -EB        Indicate SIRS criteria Tachycardia > 90 bpm;Leukocytosis (WBC > 27394 IJL)  -EB        If the answer is yes to both questions, suspicion of sepsis is present --        If severe sepsis is present AND tissue hypoperfusion perists in the hour after fluid resuscitation or lactate > 4, the patient meets criteria for SEPTIC SHOCK --        Are any of the following organ dysfunction criteria present within 6 hours of suspected infection and SIRS criteria that are NOT considered to be chronic conditions? Yes  -EB        Organ dysfunction Lactate > 2 0 mmol/L  -EB        Date of presentation of severe sepsis 12/07/22  -EB        Time of presentation of severe sepsis 0502  -EB        Tissue hypoperfusion persists in the hour after crystalloid fluid administration, evidenced, by either: --        Was hypotension present within one hour of the conclusion of crystalloid fluid administration?  No  -EB        Date of presentation of septic shock --        Time of presentation of septic shock --              User Key  (r) = Recorded By, (t) = Taken By, (c) = Cosigned By    [address] Name Provider Type    EB Janay Rogel DO Physician

## 2022-12-07 NOTE — ASSESSMENT & PLAN NOTE
· Receives chemotherapy through [facility]   Last treatment 1 week ago  · Patient's wife will bring in a list with the chemo medication names  · PET scan scheduled outpatient for 12/12/2022

## 2022-12-07 NOTE — CASE MANAGEMENT
Case Management Assessment & Discharge Planning Note    Patient name Lorrie Myles  Location [address] [id number] MRN [id number]  : 1945 Date 2022       Current Admission Date: 2022  Current Admission Diagnosis:Sepsis due to COVID-19 [facility])   Patient Active Problem List    Diagnosis Date Noted   • Pneumonia 2022   • CVID (common variable immunodeficiency) ([facility] ) 2022   • Acute respiratory failure with hypoxia ([place] [address] ) 2022   • Glossitis 2022   • Chest pain 2022   • Mouth ulcers 2021   • Iron deficiency anemia 2021   • Sepsis due to COVID-19 ([facility] ) 04/15/2021   • Pneumonia due to COVID-19 virus 2021   • Gastroesophageal reflux disease with esophagitis 2021   • Roberts's esophagus without dysplasia 2019   • Schatzki's ring of distal esophagus 2019   • Pharyngeal dysphagia 10/11/2019   • Personal history of colon cancer 10/11/2019   • Paroxysmal atrial fibrillation (HCC)    • Leukocytosis 2019   • Chronic diastolic congestive heart failure ([facility] ) 2019   • History of aortic valve replacement with bioprosthetic valve 2019   • S/P total knee arthroplasty, left 2019   • Essential hypertension 2018   • Hx of CABG    • Primary localized osteoarthritis of right knee 2018   • Multiple myeloma not having achieved remission ([facility] ) 2018   • Smoking 2018   • Basal cell carcinoma of skin 2018   • Erectile dysfunction 2017   • Coronary artery disease involving native heart with angina pectoris, unspecified vessel or lesion type ([facility] ) 10/07/2014   • Malignant tumor of cecum ([facility] ) 2013   • Type 2 diabetes mellitus without complication, without long-term current use of insulin ([facility] ) 2012   • Fatty liver 2012   • Lymphoma ([facility] ) 2012   • Dyslipidemia 2012      LOS (days): 0  Geometric Mean LOS (GMLOS) (days): 5 00  Days to GMLOS:4 6     OBJECTIVE:    Risk of Unplanned Readmission Score: 16 84         Current admission status: Inpatient  Referral Reason: Other (post acute needs)    Preferred Pharmacy:   [address] [address]  Phone: [phone] Fax: [phone]    Primary Care Provider: Damaris Castellon DO    Primary Insurance: [place] [facility]  Secondary Insurance:     ASSESSMENT:  [address] - Spouse   Primary Phone: [phone] (Mobile)               Advance Directives  Does patient have a [address]?: Yes  Does patient have Advance Directives?: Yes  Advance Directives: Living will, Power of  for health care  Primary Contact: Wife         Readmission Root Cause  30 Day Readmission: No    Patient Information  Admitted from[de-identified] Home  Mental Status: Alert  During Assessment patient was accompanied by: Spouse  Assessment information provided by[de-identified] Spouse  Primary Caregiver: Self  Support Systems: Spouse/significant other  [place] of Residence: [address] [place] [address] do you live in?: [address] [place] [address] entry access options  Select all that apply : Stairs  Number of steps to enter home  : 1  Do the steps have railings?: No  Type of Current Residence: [address] home  Upon entering residence, is there a bedroom on the main floor (no further steps)?: Yes  In the last 12 months, was there a time when you were not able to pay the mortgage or rent on time?: No  In the last 12 months, how many places have you lived?: 1  In the last 12 months, was there a time when you did not have a steady place to sleep or slept in a shelter (including now)?: No  Homeless/housing insecurity resource given?: N/A  Living Arrangements: Lives w/ Spouse/significant other  Is patient a ?: No    Activities of Daily Living Prior to Admission  Functional Status: Independent  Completes ADLs independently?: Yes  Ambulates independently?: Yes  Does patient use assisted devices?: No  Does patient currently own DME?: Yes  What DME does the patient currently own?: Maye Reyesd, Wheelchair, Straight Cane  Does patient have a history of Outpatient Therapy (PT/OT)?: No  Does the patient have a history of Short-Term Rehab?: No  Does patient have a history of HHC?: Yes  Does patient currently have [address]?: No         Patient Information Continued  Income Source: Employed  Does patient have prescription coverage?: Yes  Within the past 12 months, you worried that your food would run out before you got the money to buy more : Never true  Within the past 12 months, the food you bought just didn't last and you didn't have money to get more : Never true  Food insecurity resource given?: N/A  Does patient receive dialysis treatments?: No  Does patient have a history of substance abuse?: No  Does patient have a history of Mental Health Diagnosis?: No         Means of Transportation  Means of Transport to Appts[de-identified] Drives Self  In the past 12 months, has lack of transportation kept you from medical appointments or from getting medications?: No  In the past 12 months, has lack of transportation kept you from meetings, work, or from getting things needed for daily living?: No  Was application for public transport provided?: N/A        DISCHARGE DETAILS:    Discharge planning discussed with[de-identified] Spoke with pts wife via pt phone  Freedom of Choice: Yes  Comments - Freedom of Choice: discussed dc planning   Pt and wife feel there are no needs for home  CM contacted family/caregiver?: Yes  Were Treatment Team discharge recommendations reviewed with patient/caregiver?: Yes  Did patient/caregiver verbalize understanding of patient care needs?: Yes       Contacts  Patient Contacts: Emil Wright  Relationship to Patient[de-identified] Family  Contact Method: Phone  Phone Number: [phone]  Reason/Outcome: Discharge [address]         Is the patient interested in [address] at discharge?: No    DME Referral Provided  Referral made for DME?: No    Other Referral/Resources/Interventions Provided:  Interventions: None Indicated  Referral Comments: No needs anticipated at this time  Pt is OOB in chair and getting about his room  No O2 needs reported at this time  Cm following            Discharge Destination Plan[de-identified] Home, Home with Kim at Discharge : Family                             IMM Given (Date):: 12/07/22  IMM Given to[de-identified] Patient  Family notified[de-identified] Tip Roth spouse  Additional Comments: Cm spoke with pt and his wife via pt phone as pt is COVID + 12/7  Pts wife reports that she and pt reside in a [facility] with [facility]  Pt does not use any DME at baseline but he owns a walker, wc,cane and portable O2 consentrator that he bought in case he needed O2  It is not prescribed  She states pt has a hx of HHC with [facility]   He has no hx of STR/PT/OT/MH/DA  Pt is retired but still works from home per wife  Pt drives, uses [facility] pharmacy and sees  PCP  At this time pt and wife deny needs for home  Pt is on rm at air this time of interview in the AM 12/7   CM following

## 2022-12-07 NOTE — OCCUPATIONAL THERAPY NOTE
Occupational Therapy Screen Note     Patient Name: Meghan Marinelli  Today's Date: 12/7/2022  Problem List  Principal Problem:    Sepsis due to COVID-19 [facility])  Active Problems:    Type 2 diabetes mellitus without complication, without long-term current use of insulin ([facility] )    Multiple myeloma not having achieved remission (HCC)    Chronic diastolic congestive heart failure (HCC)    Paroxysmal atrial fibrillation ([facility] )    Acute respiratory failure with hypoxia ([facility] )              12/07/22 1247   OT Last Visit   OT Visit Date 12/07/22   Note Type   Note type Screen   Additional Comments OT orders received, chart reviewed  Nursing reports pt has been mobilizing independently <> BR  PT spoke with pt who was sitting on couch in room  Pt reports he is at his functional baseline & has no concerns upon DC  No acute OT needs  Will DC OT orders       Mc Shaver OTR/L

## 2022-12-08 LAB
ALBUMIN SERPL BCP-MCNC: 2.6 G/DL (ref 3.5–5)
ALP SERPL-CCNC: 90 U/L (ref 46–116)
ALT SERPL W P-5'-P-CCNC: 14 U/L (ref 12–78)
ANION GAP SERPL CALCULATED.3IONS-SCNC: 6 MMOL/L (ref 4–13)
APTT PPP: 79 SECONDS (ref 23–37)
AST SERPL W P-5'-P-CCNC: 11 U/L (ref 5–45)
BASOPHILS # BLD AUTO: 0.04 THOUSANDS/ÂΜL (ref 0–0.1)
BASOPHILS NFR BLD AUTO: 0 % (ref 0–1)
BILIRUB SERPL-MCNC: 0.5 MG/DL (ref 0.2–1)
BILIRUB UR QL STRIP: NEGATIVE
BUN SERPL-MCNC: 12 MG/DL (ref 5–25)
CALCIUM ALBUM COR SERPL-MCNC: 9.9 MG/DL (ref 8.3–10.1)
CALCIUM SERPL-MCNC: 8.8 MG/DL (ref 8.3–10.1)
CHLORIDE SERPL-SCNC: 101 MMOL/L (ref 96–108)
CLARITY UR: CLEAR
CO2 SERPL-SCNC: 29 MMOL/L (ref 21–32)
COLOR UR: YELLOW
CREAT SERPL-MCNC: 0.86 MG/DL (ref 0.6–1.3)
EOSINOPHIL # BLD AUTO: 0.02 THOUSAND/ÂΜL (ref 0–0.61)
EOSINOPHIL NFR BLD AUTO: 0 % (ref 0–6)
ERYTHROCYTE [DISTWIDTH] IN BLOOD BY AUTOMATED COUNT: 15 % (ref 11.6–15.1)
GFR SERPL CREATININE-BSD FRML MDRD: 84 ML/MIN/1.73SQ M
GLUCOSE SERPL-MCNC: 143 MG/DL (ref 65–140)
GLUCOSE SERPL-MCNC: 173 MG/DL (ref 65–140)
GLUCOSE SERPL-MCNC: 261 MG/DL (ref 65–140)
GLUCOSE SERPL-MCNC: 281 MG/DL (ref 65–140)
GLUCOSE SERPL-MCNC: 320 MG/DL (ref 65–140)
GLUCOSE UR STRIP-MCNC: ABNORMAL MG/DL
HCT VFR BLD AUTO: 33.8 % (ref 36.5–49.3)
HGB BLD-MCNC: 11.2 G/DL (ref 12–17)
HGB UR QL STRIP.AUTO: NEGATIVE
IMM GRANULOCYTES # BLD AUTO: 0.13 THOUSAND/UL (ref 0–0.2)
IMM GRANULOCYTES NFR BLD AUTO: 1 % (ref 0–2)
KETONES UR STRIP-MCNC: NEGATIVE MG/DL
LEUKOCYTE ESTERASE UR QL STRIP: NEGATIVE
LYMPHOCYTES # BLD AUTO: 1.64 THOUSANDS/ÂΜL (ref 0.6–4.47)
LYMPHOCYTES NFR BLD AUTO: 10 % (ref 14–44)
MAGNESIUM SERPL-MCNC: 1.8 MG/DL (ref 1.6–2.6)
MCH RBC QN AUTO: 29.2 PG (ref 26.8–34.3)
MCHC RBC AUTO-ENTMCNC: 33.1 G/DL (ref 31.4–37.4)
MCV RBC AUTO: 88 FL (ref 82–98)
MONOCYTES # BLD AUTO: 1.09 THOUSAND/ÂΜL (ref 0.17–1.22)
MONOCYTES NFR BLD AUTO: 7 % (ref 4–12)
NEUTROPHILS # BLD AUTO: 13.53 THOUSANDS/ÂΜL (ref 1.85–7.62)
NEUTS SEG NFR BLD AUTO: 82 % (ref 43–75)
NITRITE UR QL STRIP: NEGATIVE
NRBC BLD AUTO-RTO: 0 /100 WBCS
PH UR STRIP.AUTO: 7 [PH]
PLATELET # BLD AUTO: 187 THOUSANDS/UL (ref 149–390)
PMV BLD AUTO: 10 FL (ref 8.9–12.7)
POTASSIUM SERPL-SCNC: 3.6 MMOL/L (ref 3.5–5.3)
PROCALCITONIN SERPL-MCNC: 1.59 NG/ML
PROT SERPL-MCNC: 6.1 G/DL (ref 6.4–8.4)
PROT UR STRIP-MCNC: NEGATIVE MG/DL
RBC # BLD AUTO: 3.83 MILLION/UL (ref 3.88–5.62)
SODIUM SERPL-SCNC: 136 MMOL/L (ref 135–147)
SP GR UR STRIP.AUTO: <1.005 (ref 1–1.03)
UROBILINOGEN UR STRIP-ACNC: <2 MG/DL
WBC # BLD AUTO: 16.45 THOUSAND/UL (ref 4.31–10.16)

## 2022-12-08 PROCEDURE — 82948 REAGENT STRIP/BLOOD GLUCOSE: CPT

## 2022-12-08 PROCEDURE — 80053 COMPREHEN METABOLIC PANEL: CPT | Performed by: PHYSICIAN ASSISTANT

## 2022-12-08 PROCEDURE — 83735 ASSAY OF MAGNESIUM: CPT | Performed by: PHYSICIAN ASSISTANT

## 2022-12-08 PROCEDURE — 85025 COMPLETE CBC W/AUTO DIFF WBC: CPT | Performed by: PHYSICIAN ASSISTANT

## 2022-12-08 PROCEDURE — 84145 PROCALCITONIN (PCT): CPT | Performed by: PHYSICIAN ASSISTANT

## 2022-12-08 PROCEDURE — 99232 SBSQ HOSP IP/OBS MODERATE 35: CPT | Performed by: INTERNAL MEDICINE

## 2022-12-08 PROCEDURE — 85730 THROMBOPLASTIN TIME PARTIAL: CPT | Performed by: PHYSICIAN ASSISTANT

## 2022-12-08 RX ADMIN — INSULIN LISPRO 3 UNITS: 100 INJECTION, SOLUTION INTRAVENOUS; SUBCUTANEOUS at 21:08

## 2022-12-08 RX ADMIN — FUROSEMIDE 20 MG: 20 TABLET ORAL at 08:16

## 2022-12-08 RX ADMIN — HEPARIN SODIUM 18 UNITS/KG/HR: 10000 INJECTION, SOLUTION INTRAVENOUS at 20:37

## 2022-12-08 RX ADMIN — ATORVASTATIN CALCIUM 40 MG: 40 TABLET, FILM COATED ORAL at 08:14

## 2022-12-08 RX ADMIN — ASPIRIN 81 MG CHEWABLE TABLET 81 MG: 81 TABLET CHEWABLE at 08:14

## 2022-12-08 RX ADMIN — VALACYCLOVIR HYDROCHLORIDE 500 MG: 500 TABLET, FILM COATED ORAL at 08:16

## 2022-12-08 RX ADMIN — INSULIN LISPRO 5 UNITS: 100 INJECTION, SOLUTION INTRAVENOUS; SUBCUTANEOUS at 17:35

## 2022-12-08 RX ADMIN — FUROSEMIDE 20 MG: 20 TABLET ORAL at 17:51

## 2022-12-08 RX ADMIN — FERROUS SULFATE TAB 325 MG (65 MG ELEMENTAL FE) 325 MG: 325 (65 FE) TAB at 08:13

## 2022-12-08 RX ADMIN — INSULIN LISPRO 4 UNITS: 100 INJECTION, SOLUTION INTRAVENOUS; SUBCUTANEOUS at 12:27

## 2022-12-08 RX ADMIN — REMDESIVIR 100 MG: 100 INJECTION, POWDER, LYOPHILIZED, FOR SOLUTION INTRAVENOUS at 08:16

## 2022-12-08 RX ADMIN — DEXAMETHASONE SODIUM PHOSPHATE 6 MG: 4 INJECTION, SOLUTION INTRAMUSCULAR; INTRAVENOUS at 08:16

## 2022-12-08 RX ADMIN — METOPROLOL SUCCINATE 100 MG: 50 TABLET, EXTENDED RELEASE ORAL at 08:14

## 2022-12-08 RX ADMIN — MONTELUKAST 10 MG: 10 TABLET, FILM COATED ORAL at 08:14

## 2022-12-08 RX ADMIN — PANTOPRAZOLE SODIUM 40 MG: 40 TABLET, DELAYED RELEASE ORAL at 08:13

## 2022-12-08 RX ADMIN — HEPARIN SODIUM 18 UNITS/KG/HR: 10000 INJECTION, SOLUTION INTRAVENOUS at 04:11

## 2022-12-08 RX ADMIN — LEVOFLOXACIN 750 MG: 5 INJECTION, SOLUTION INTRAVENOUS at 20:37

## 2022-12-08 NOTE — UTILIZATION REVIEW
Initial Clinical Review    Admission: Date/Time/Statement:   Admission Orders (From admission, onward)     Ordered        12/07/22 0507  INPATIENT ADMISSION  Once                      Orders Placed This Encounter   Procedures   • INPATIENT ADMISSION     Standing Status:   Standing     Number of Occurrences:   1     Order Specific Question:   Level of Care     Answer:   Med Surg [16]     Order Specific Question:   Estimated length of stay     Answer:   More than 2 Midnights     Order Specific Question:   Certification     Answer:   I certify that inpatient services are medically necessary for this patient for a duration of greater than two midnights  See H&P and MD Progress Notes for additional information about the patient's course of treatment  ED Arrival Information     Expected   -    Arrival   12/7/2022 03:35    Acuity   Emergent            Means of arrival   Walk-In    Escorted by   Self    Service   Hospitalist    Admission type   Emergency            Arrival complaint   short of breath and spiting up blood           Chief Complaint   Patient presents with   • Shortness of Breath       Initial Presentation: 77 y o  male from home to ED admitted inpatient due to Sepsis due to COVID 19/acute respiratory failure with hypoxia  PMH of PAF, type 2 diabetes, CHF, multiple myeloma on chemo  Presented due to fever, shortness of breath starting evening prior to arrival,    productive cough of blood tinged sputum starting week prior  On exam: lungs rales  SARS-COV-2 positive   K 3 3  Lactic acid 2 4  NT-proBNP 1542  Wbc 12 90  D dimer 1 58  In the ED given Levaquin and IVF bolus, placed on oxygen for hypoxia  Plan is start Remdesivir and Decadron  Start Heparin gtt, check ct chest   Follow cultures  Oxygen as needed  Continue Levaquin  Date: 12/8/22   Day 2:  No new complaints  On exam diminished breath sounds  Incentive spirometry 2000 ml with goal of same  Procalcitonin 1 59    Glucose 143, 281   On oxygen  Wean oxygen as able  Continue Decadron and remdesivir  Heparin gtt in progress  Continue IV Levaquin  ED Triage Vitals   Temperature Pulse Respirations Blood Pressure SpO2   12/07/22 0344 12/07/22 0344 12/07/22 0344 12/07/22 0344 12/07/22 0344   (!) 100 6 °F (38 1 °C) 99 18 161/72 92 %      Temp Source Heart Rate Source Patient Position - Orthostatic VS BP Location FiO2 (%)   12/07/22 0344 12/07/22 0608 12/07/22 0608 12/07/22 0608 --   Oral Monitor Lying Left arm       Pain Score       12/07/22 0608       No Pain          Wt Readings from Last 1 Encounters:   12/08/22 75 6 kg (166 lb 10 7 oz)     Additional Vital Signs:   12/08/22 08:15:46 98 3 °F (36 8 °C) 79 16 128/58 81 94 % -- -- -- --   12/07/22 21:48:07 98 6 °F (37 °C) 75 -- 125/56 79 93 % -- -- -- --   12/07/22 17:55:46 -- 78 -- 155/76 102 95 % -- -- -- --   12/07/22 1038 -- 77 -- -- -- 94 % -- -- -- --   12/07/22 0956 -- -- -- -- -- -- -- -- None (Room air) --   12/07/22 06:50:23 100 2 °F (37 9 °C) 87 -- 131/61 84 97 % -- -- -- --   12/07/22 0608 -- 92 24 Abnormal  100/77 85 97 % 28 2 L/min Nasal cannula Lying   12/07/22 0510 -- 88 -- 118/58 84 95 % -- -- -- --   12/07/22 0455 -- 87 -- 122/59 85 94 % -- --       Pertinent Labs/Diagnostic Test Results:   VAS lower limb venous duplex study, complete bilateral   Final Result by Dyan Hogue DO (12/07 7296)      CTA chest pe study   Final Result by Gail Strange MD (12/07 1318)      1  No pulmonary embolism  2   New patchy nodular airspace opacities in the right upper lobe and superior right lower lobe since PET/CT 8/10/2022, consistent with an infectious/inflammatory etiology  Recommend follow-up CT chest in 3 months to assess for resolution  3   Persistent mediastinal lymphadenopathy 4/19/2022, thelma metastases cannot be excluded        4   Severe degenerative changes of the right shoulder with a 1 9 cm lytic lesion in the right glenoid, stable since 4/19/2022 but increased in size since 5/6/2021  This finding is indeterminate and may be related to degenerative changes versus multiple    myeloma  6   Improved small left pleural effusion with adjacent compressive atelectasis since PET/CT 8/10/2022  The study was marked in [facility] for immediate notification  Workstation performed: [id number]         XR chest 1 view portable   ED Interpretation by Anastasia Harrell DO (12/07 0430)   R sided infiltrate      Final Result by Helen Santillan MD (12/07 5300)      Extensive right consolidation which could be due to pneumonia or asymmetric pulmonary edema  Workstation performed: [id number]           12/7/22 ecg The EKG demonstrates:  Rhythm: normal sinus  Intervals: normal intervals  Axis: normal axis  QRS/Blocks: normal QRS  ST Changes:No acute ST Changes, no STD/MIGUEL ANGEL     12/7/22 venous duplex - RIGHT LOWER LIMB:  No evidence of acute or chronic deep vein thrombosis  No evidence of superficial thrombophlebitis noted  Doppler evaluation shows a normal response to augmentation maneuvers  Popliteal, posterior tibial and anterior tibial arterial Doppler waveforms are  biphasic  LEFT LOWER LIMB:  No evidence of acute or chronic deep vein thrombosis  No evidence of superficial thrombophlebitis noted  Doppler evaluation shows a normal response to augmentation maneuvers  Popliteal, posterior tibial and anterior tibial arterial Doppler waveforms are  biphasic      Results from last 7 days   Lab Units 12/07/22  0427   SARS-COV-2  Positive*     Results from last 7 days   Lab Units 12/08/22  0153 12/07/22  0805 12/07/22  0418   WBC Thousand/uL 16 45*  --  12 90*   HEMOGLOBIN g/dL 11 2*  --  12 8   HEMATOCRIT % 33 8*  --  38 3   PLATELETS Thousands/uL 187 188 209   NEUTROS ABS Thousands/µL 13 53*  --  10 51*     Results from last 7 days   Lab Units 12/08/22  0153 12/07/22  0418   SODIUM mmol/L 136 140   POTASSIUM mmol/L 3 6 3 3*   CHLORIDE mmol/L 101 101 CO2 mmol/L 29 28   ANION GAP mmol/L 6 11   BUN mg/dL 12 11   CREATININE mg/dL 0 86 1 07   EGFR ml/min/1 73sq m 84 67   CALCIUM mg/dL 8 8 8 7   MAGNESIUM mg/dL 1 8  --      Results from last 7 days   Lab Units 12/08/22  0153 12/07/22  0418   AST U/L 11 14   ALT U/L 14 19   ALK PHOS U/L 90 139*   TOTAL PROTEIN g/dL 6 1* 7 0   ALBUMIN g/dL 2 6* 3 2*   TOTAL BILIRUBIN mg/dL 0 50 0 50     Results from last 7 days   Lab Units 12/08/22  0758 12/07/22  2151 12/07/22  1751 12/07/22  1211 12/07/22  0754   POC GLUCOSE mg/dl 143* 241* 237* 198* 167*     Results from last 7 days   Lab Units 12/08/22  0153 12/07/22  0418   GLUCOSE RANDOM mg/dL 173* 205*     Results from last 7 days   Lab Units 12/07/22  0805   CK TOTAL U/L 29*     Results from last 7 days   Lab Units 12/07/22  1009 12/07/22  0619 12/07/22  0418   HS TNI 0HR ng/L  --   --  12   HS TNI 2HR ng/L  --  20  --    HSTNI D2 ng/L  --  8  --    HS TNI 4HR ng/L 16  --   --    HSTNI D4 ng/L 4  --   --      Results from last 7 days   Lab Units 12/07/22  0805 12/07/22  0418   D-DIMER QUANTITATIVE ug/ml FEU 1 58* 2 16*     Results from last 7 days   Lab Units 12/08/22  0153 12/07/22  2000 12/07/22  1246 12/07/22  0418   PROTIME seconds  --   --  15 4* 14 2   INR   --   --  1 14 1 02   PTT seconds 79* 86* 32 26     Results from last 7 days   Lab Units 12/08/22  0153 12/07/22  0453   PROCALCITONIN ng/ml 1 59* 0 11     Results from last 7 days   Lab Units 12/07/22  0805 12/07/22  0418   LACTIC ACID mmol/L 1 0 2 4*     Results from last 7 days   Lab Units 12/07/22  0418   NT-PRO BNP pg/mL 1,542*     Results from last 7 days   Lab Units 12/07/22  0805   CRP mg/L 26 8*     Results from last 7 days   Lab Units 12/08/22  0058   CLARITY UA  Clear   COLOR UA  Yellow   SPEC GRAV UA  <1 005*   PH UA  7 0   GLUCOSE UA mg/dl 1000 (1%)*   KETONES UA mg/dl Negative   BLOOD UA  Negative   PROTEIN UA mg/dl Negative   NITRITE UA  Negative   BILIRUBIN UA  Negative   UROBILINOGEN UA (BE) mg/dl <2 0   LEUKOCYTES UA  Negative     Results from last 7 days   Lab Units 12/07/22  0619 12/07/22  0427   STREP PNEUMONIAE ANTIGEN, URINE  Negative  --    LEGIONELLA URINARY ANTIGEN  Negative  --    INFLUENZA A PCR   --  Negative   INFLUENZA B PCR   --  Negative   RSV PCR   --  Negative     Results from last 7 days   Lab Units 12/07/22  0418   BLOOD CULTURE  No Growth at 24 hrs  No Growth at 24 hrs         ED Treatment:   Medication Administration from 12/07/2022 0335 to 12/07/2022 0640       Date/Time Order Dose Route Action Comments     12/07/2022 0441 EST levofloxacin (LEVAQUIN) IVPB (premix in dextrose) 750 mg 150 mL 750 mg Intravenous New Bag --     12/07/2022 0549 EST sodium chloride 0 9 % bolus 500 mL 500 mL Intravenous New Bag --        Past Medical History:   Diagnosis Date   • Angina pectoris ([place] [address] )    • Arthritis    • Atrial fibrillation ([place] [address] )    • Cataract    • Colitis    • Colon polyp    • Coronary artery disease 2014   • Diabetes mellitus ([place] [address] )    • Fatty liver    • GERD (gastroesophageal reflux disease)    • History of chemotherapy    • History of radiation therapy    • History of shingles 2018   • History of transfusion    • Hyperlipidemia    • Hypertension    • 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[address] )    • Pneumonia 08/11/22   • Skin cancer      Present on Admission:  • Multiple myeloma not having achieved remission ([place] [address] )  • Acute respiratory failure with hypoxia (HCC)  • Paroxysmal atrial fibrillation (HCC)  • Type 2 diabetes mellitus without complication, without long-term current use of insulin (HCC)  • Chronic diastolic congestive heart failure (HCC)      Admitting Diagnosis: Shortness of breath [R06 02]  Acute respiratory insufficiency [R06 89]  Pneumonia [J18 9]  Severe sepsis ([place] [address] ) [A41 9, R65 20]  Sepsis due to COVID-19 ([place] [address] ) [U07 1, A41 89]  Age/Sex: 77 y o  male  Admission Orders:  Scheduled Medications:  aspirin, 81 mg, Oral, Daily  atorvastatin, 40 mg, Oral, Daily  dexamethasone, 6 mg, Intravenous, Q24H  ferrous sulfate, 325 mg, Oral, Daily With Breakfast  furosemide, 20 mg, Oral, BID  insulin lispro, 1-6 Units, Subcutaneous, TID AC  insulin lispro, 1-6 Units, Subcutaneous, HS  levofloxacin, 750 mg, Intravenous, Q24H  metoprolol succinate, 100 mg, Oral, Daily  montelukast, 10 mg, Oral, Daily  pantoprazole, 40 mg, Oral, Early Morning  remdesivir, 100 mg, Intravenous, Q24H  valACYclovir, 500 mg, Oral, Daily      Continuous IV Infusions:  heparin (porcine), 3-30 Units/kg/hr (Order-Specific), Intravenous, Titrated      PRN Meds:  acetaminophen, 650 mg, Oral, Q6H PRN  benzonatate, 100 mg, Oral, TID PRN  heparin (porcine), 2,800 Units, Intravenous, Q1H PRN  heparin (porcine), 5,600 Units, Intravenous, Q1H PRN  ipratropium, 0 5 mg, Nebulization, Q6H PRN  levalbuterol, 1 25 mg, Nebulization, Q6H PRN  ondansetron, 4 mg, Intravenous, Q6H PRN        Network Utilization Review Department  ATTENTION: Please call with any questions or concerns to [phone] and carefully listen to the prompts so that you are directed to the right person  All voicemails are confidential   Caroline Skipper all requests for admission clinical reviews, approved or denied determinations and any other requests to dedicated fax number below belonging to the campus where the patient is receiving treatment   List of dedicated fax numbers for the Facilities:  [address] DENIALS (Administrative/Medical Necessity) [phone]   [address] (Maternity/NICU/Pediatrics) [phone]   [address] [phone]   [facility]  [phone]   [address] [place] [address] Chloe Andres [facility] [address] [facility] [facility] [address] - [address] [phone]

## 2022-12-08 NOTE — ASSESSMENT & PLAN NOTE
· Presented due to SOB, productive cough and fevers at home  Cough has been ongoing for about 1 week  · Patient was on 2 L nasal cannula  Currently on room air  · Meeting sepsis criteria due to tachycardia, fever 100 6, WBC 12 90  Covid 19 positive   · Lactic 2 4  · Procal wnl  · Chest x-ray- Extensive right consolidation   · Covid labs   · CK: 29  · BNP: 1542  · Troponin: 12  · CRP: 26 8  · D-dimer: 2 16  · On IV Levaquin  · Procalcitonin is 1 59  Trend procalcitonin  · Blood cultures negative to date  · Continue IV remdesivir and dexamethasone   · Patient had CT chest which did not reveal any PE    Venous duplex of lower extremity was negative for DVT  · Continue on heparin drip

## 2022-12-08 NOTE — ASSESSMENT & PLAN NOTE
· Dx with covid  On 2L NC in the ER  · Currently on room air  · Respiratory protocol  · IV dexamethasone and remdesivir     · Wean oxygen as able

## 2022-12-08 NOTE — ASSESSMENT & PLAN NOTE
· Receives chemotherapy through [facility]   Last treatment 1 week ago  · Patient's wife will bring in a list with the chemo medication names  · PET scan scheduled outpatient for 12/12/2022

## 2022-12-08 NOTE — PLAN OF CARE
Problem: INFECTION - ADULT  Goal: Absence or prevention of progression during hospitalization  Description: INTERVENTIONS:  - Assess and monitor for signs and symptoms of infection  - Monitor lab/diagnostic results  - Monitor all insertion sites, i e  indwelling lines, tubes, and drains  - Monitor endotracheal if appropriate and nasal secretions for changes in amount and color  - [place] appropriate cooling/warming therapies per order  - Administer medications as ordered  - Instruct and encourage patient and family to use good hand hygiene technique  - Identify and instruct in appropriate isolation precautions for identified infection/condition  Outcome: Progressing  Goal: Absence of fever/infection during neutropenic period  Description: INTERVENTIONS:  - Monitor WBC    Outcome: Progressing

## 2022-12-08 NOTE — ASSESSMENT & PLAN NOTE
Lab Results   Component Value Date    HGBA1C 6 6 (H) 07/27/2022       Recent Labs     12/07/22  1751 12/07/22  2151 12/08/22  0758 12/08/22  1144   POCGLU 237* 241* 143* 281*       Blood Sugar Average: Last 72 hrs:  · (P) 203 [id number] home oral hypoglycemics  · SSI

## 2022-12-08 NOTE — PROGRESS NOTES
[place]  Progress Note - Cierra Garcia 1945, 77 y o  male MRN: [id number]  Unit/Bed#: -Lillie Encounter: [id number]  Primary Care Provider: Amari Foster DO   Date and time admitted to hospital: 12/7/2022  3:39 AM    Acute respiratory failure with hypoxia [facility])  Assessment & Plan    · Dx with covid  On 2L NC in the ER  · Currently on room air  · Respiratory protocol  · IV dexamethasone and remdesivir  · Wean oxygen as able      Paroxysmal atrial fibrillation (HCC)  Assessment & Plan  · Continue home metoprolol succinate  Not on anticoagulation    Chronic diastolic congestive heart failure (HCC)  Assessment & Plan  Wt Readings from Last 3 Encounters:   12/08/22 75 6 kg (166 lb 10 7 oz)   08/19/22 74 8 kg (164 lb 12 8 oz)   08/01/22 74 3 kg (163 lb 12 8 oz)     · Prior echo 5/2/2022: EF 59%, grade 2 diastolic dysfunction  · Monitor I's/O and daily weights  · Continue home Lasix        Multiple myeloma not having achieved remission ([facility] [address] )  Assessment & Plan  · Receives chemotherapy through [facility]   Last treatment 1 week ago  · Patient's wife will bring in a list with the chemo medication names  · PET scan scheduled outpatient for 12/12/2022    Type 2 diabetes mellitus without complication, without long-term current use of insulin [facility])  Assessment & Plan  Lab Results   Component Value Date    HGBA1C 6 6 (H) 07/27/2022       Recent Labs     12/07/22  1751 12/07/22  2151 12/08/22  0758 12/08/22  1144   POCGLU 237* 241* 143* 281*       Blood Sugar Average: Last 72 hrs:  · (P) 783 [id number] home oral hypoglycemics  · SSI    * Sepsis due to COVID-19 [facility])  Assessment & Plan  · Presented due to SOB, productive cough and fevers at home  Cough has been ongoing for about 1 week  · Patient was on 2 L nasal cannula  Currently on room air  · Meeting sepsis criteria due to tachycardia, fever 100 6, WBC 12 90  Covid 19 positive   · Lactic 2 4  · Procal wnl  · Chest x-ray- Extensive right consolidation   · Covid labs   · CK: 29  · BNP: 1542  · Troponin: 12  · CRP: 26 8  · D-dimer: 2 16  · On IV Levaquin  · Procalcitonin is 1 59  Trend procalcitonin  · Blood cultures negative to date  · Continue IV remdesivir and dexamethasone   · Patient had CT chest which did not reveal any PE  Venous duplex of lower extremity was negative for DVT  · Continue on heparin drip      Labs & Imaging: I have personally reviewed pertinent reports  VTE Prophylaxis: in place  Code Status:   Level 1 - Full Code    Patient Centered Rounds: I have performed bedside rounds with nursing staff today  Discussions with Specialists or Other Care Team Provider: CM    Education and Discussions with Family / Patient: Wife at bedside    Current Length of Stay: 1 day(s)    Current Patient Status: Inpatient   Certification Statement: The patient will continue to require additional inpatient hospital stay due to see my assessment and plan  Subjective:   Patient is seen and examined at bedside  Denies any new complaints  Afebrile  On room air  All other ROS are negative  Objective:    Vitals: Blood pressure 128/58, pulse 79, temperature 98 3 °F (36 8 °C), resp  rate 16, height 5' 11" (1 803 m), weight 75 6 kg (166 lb 10 7 oz), SpO2 94 %  ,Body mass index is 23 25 kg/m²  SPO2 RA Rest    Flowsheet Row ED to Hosp-Admission (Current) from 12/7/2022 in [address] Med Surg Unit   SpO2 94 %   SpO2 Activity At Rest   O2 Device None (Room air)   O2 Flow Rate --        I&O:     Intake/Output Summary (Last 24 hours) at 12/8/2022 1223  Last data filed at 12/8/2022 1143  Gross per 24 hour   Intake 240 ml   Output 1800 ml   Net -1560 ml       Physical Exam:    General- Alert, lying comfortably in bed  Not in any acute distress  Neck- Supple, No JVD  CVS- regular, S1 and S2 normal  Chest- Bilateral Air entry, No rhochi, crackles or wheezing present    Abdomen- soft, nontender, not distended, no guarding or rigidity, BS+  Extremities-  No pedal edema, No calf tenderness  CNS-   Alert, awake and orientedx3  No focal deficits present  Invasive Devices     Peripheral Intravenous Line  Duration           Peripheral IV 12/07/22 Distal;Right;Upper;Ventral (anterior) Antecubital 1 day    Peripheral IV 12/08/22 Dorsal (posterior); Left Forearm <1 day                      Social History  reviewed  Family History   Problem Relation Age of Onset   • Heart block Family    • Coronary artery disease Mother    • Thrombosis Mother    • Hypertension Mother    • Arthritis Mother    • Hyperlipidemia Mother    • Diabetes Father    • Hypertension Father    • Arthritis Father    • Sudden death Father         cardiac   • Colon cancer Paternal Grandfather    • Cancer Paternal Grandfather    • Breast cancer Sister    • Heart disease Brother         Coronary stents    reviewed    Meds:  Current Facility-Administered Medications   Medication Dose Route Frequency Provider Last Rate Last Admin   • acetaminophen (TYLENOL) tablet 650 mg  650 mg Oral Q6H PRN Cristobal Don PA-C   650 mg at 12/07/22 0757   • aspirin chewable tablet 81 mg  81 mg Oral Daily Karen Monte PA-C   81 mg at 12/08/22 3399   • atorvastatin (LIPITOR) tablet 40 mg  40 mg Oral Daily Karen Monte PA-C   40 mg at 12/08/22 5371   • benzonatate (TESSALON PERLES) capsule 100 mg  100 mg Oral TID PRN Cristobal Don PA-C       • dexamethasone (DECADRON) injection 6 mg  6 mg Intravenous Q24H Karen Monte PA-C   6 mg at 12/08/22 0816   • ferrous sulfate tablet 325 mg  325 mg Oral Daily With Breakfast Karen Monte PA-C   325 mg at 12/08/22 0813   • furosemide (LASIX) tablet 20 mg  20 mg Oral BID Cristobal Don PA-C   20 mg at 12/08/22 0816   • heparin (porcine) 25,000 units in 0 45% NaCl 250 mL infusion (premix)  3-30 Units/kg/hr (Order-Specific) Intravenous Titrated Raysa Duff MD 12 6 mL/hr at 12/08/22 0411 18 Units/kg/hr at 12/08/22 0411   • heparin (porcine) injection 2,800 Units  2,800 Units Intravenous Q1H PRN Faviola Wheat MD       • heparin (porcine) injection 5,600 Units  5,600 Units Intravenous Q1H PRN Faviola Wheat MD       • insulin lispro (HumaLOG) 100 units/mL subcutaneous injection 1-6 Units  1-6 Units Subcutaneous TID AC Stephen Metzger PA-C   3 Units at 12/07/22 1753   • insulin lispro (HumaLOG) 100 units/mL subcutaneous injection 1-6 Units  1-6 Units Subcutaneous HS Stephen Metzger PA-C   3 Units at 12/07/22 2151   • ipratropium (ATROVENT) 0 02 % inhalation solution 0 5 mg  0 5 mg Nebulization Q6H PRN Meño Siddiqui MD       • levalbuterol [facility]) inhalation solution 1 25 mg  1 25 mg Nebulization Q6H PRN Meño Siddiqui MD       • levofloxacin (LEVAQUIN) IVPB (premix in dextrose) 750 mg 150 mL  750 mg Intravenous Q24H Stephen Metzger PA-C 100 mL/hr at 12/07/22 2153 750 mg at 12/07/22 2153   • metoprolol succinate (TOPROL-XL) 24 hr tablet 100 mg  100 mg Oral Daily Karen Monte PA-C   100 mg at 12/08/22 6960   • montelukast (SINGULAIR) tablet 10 mg  10 mg Oral Daily Stephen Metzger PA-C   10 mg at 12/08/22 6240   • ondansetron (ZOFRAN) injection 4 mg  4 mg Intravenous Q6H PRN Stephen Metzger PA-C       • pantoprazole (PROTONIX) EC tablet 40 mg  40 mg Oral Early Morning Stephen Metzger PA-C   40 mg at 12/08/22 0813   • remdesivir (Veklury) 100 mg in sodium chloride 0 9 % 270 mL IVPB  100 mg Intravenous Q24H Stephen Metzger PA-C   100 mg at 12/08/22 0816   • valACYclovir (VALTREX) tablet 500 mg  500 mg Oral Daily Stephen Metzger PA-C   500 mg at 12/08/22 0816      Medications Prior to Admission   Medication   • aspirin 81 MG tablet   • atorvastatin (LIPITOR) 40 mg tablet   • cholecalciferol (VITAMIN D3) 1,000 units tablet   • dexamethasone (DECADRON) 4 mg tablet   • ferrous sulfate 324 (65 Fe) mg   • fluconazole (DIFLUCAN) 100 mg tablet   • furosemide (LASIX) 20 mg tablet   • metFORMIN (GLUCOPHAGE-XR) 500 mg 24 hr tablet   • metoprolol succinate (TOPROL-XL) 100 mg 24 hr tablet   • montelukast (SINGULAIR) 10 mg tablet   • multivitamin-minerals (CENTRUM ADULTS) tablet   • nitroglycerin (NITROSTAT) 0 3 mg SL tablet   • omeprazole (PriLOSEC) 40 MG capsule   • Revlimid 15 MG CAPS   • valACYclovir (VALTREX) 500 mg tablet       Labs:  Results from last 7 days   Lab Units 12/08/22  0153 12/07/22  0805 12/07/22  0418   WBC Thousand/uL 16 45*  --  12 90*   HEMOGLOBIN g/dL 11 2*  --  12 8   HEMATOCRIT % 33 8*  --  38 3   PLATELETS Thousands/uL 187 188 209   NEUTROS PCT % 82*  --  80*   LYMPHS PCT % 10*  --  9*   MONOS PCT % 7  --  8   EOS PCT % 0  --  1     Results from last 7 days   Lab Units 12/08/22  0153 12/07/22  0418   POTASSIUM mmol/L 3 6 3 3*   CHLORIDE mmol/L 101 101   CO2 mmol/L 29 28   BUN mg/dL 12 11   CREATININE mg/dL 0 86 1 07   CALCIUM mg/dL 8 8 8 7   ALK PHOS U/L 90 139*   ALT U/L 14 19   AST U/L 11 14     Lab Results   Component Value Date    TROPONINI 0 05 (H) 04/15/2021    TROPONINI 0 04 04/06/2021    TROPONINI 0 05 (H) 04/06/2021    CKMB 1 7 04/07/2021    CKMB 1 9 04/06/2021    CKTOTAL 29 (L) 12/07/2022    CKTOTAL 24 (L) 04/15/2021    CKTOTAL 193 04/07/2021     Results from last 7 days   Lab Units 12/07/22  1246 12/07/22  0418   INR  1 14 1 02     Lab Results   Component Value Date    BLOODCX No Growth at 24 hrs  12/07/2022    BLOODCX No Growth at 24 hrs  12/07/2022    BLOODCX No Growth After 5 Days  04/19/2022    BLOODCX Bacillus species NOT anthracis (A) 04/19/2022    SPUTUMCULTUR 2+ Growth of 04/19/2022         Imaging:  Results for orders placed during the hospital encounter of 12/07/22    XR chest 1 view portable    Narrative  CHEST    INDICATION:   SOB  Patient has confirmed COVID-19  Positive 12/7/2022  COMPARISON:  CXR and chest CT 4/19/2022  EXAM PERFORMED/VIEWS:  XR CHEST PORTABLE      FINDINGS:    Normal heart size  AVR  CABG  Extensive consolidation in the right lung  No effusion or pneumothorax      Moderate left glenohumeral degenerative disease  Impression  Extensive right consolidation which could be due to pneumonia or asymmetric pulmonary edema  Workstation performed: [id number]    Results for orders placed during the hospital encounter of 09/11/19    XR chest 2 views    Narrative  CHEST    INDICATION:   Chest Pain  COMPARISON:  8/20/2019  EXAM PERFORMED/VIEWS:  XR CHEST PA & LATERAL  The frontal view was performed utilizing dual energy radiographic technique  Images: 4    FINDINGS:  There are median sternotomy wires indicating prior cardiac surgery  Cardiomediastinal silhouette appears unremarkable  The lungs are clear  No pneumothorax or pleural effusion  Osseous structures appear within normal limits for patient age  Impression  No acute cardiopulmonary disease          Workstation performed: [id number]      Last 24 Hours Medication List:   Current Facility-Administered Medications   Medication Dose Route Frequency Provider Last Rate   • acetaminophen  650 mg Oral Q6H PRN Elyse Scott PA-C     • aspirin  81 mg Oral Daily Elyse Scott PA-C     • atorvastatin  40 mg Oral Daily Elyse Scott PA-C     • benzonatate  100 mg Oral TID PRN Elyse Scott PA-C     • dexamethasone  6 mg Intravenous Q24H Elyse Scott PA-C     • ferrous sulfate  325 mg Oral Daily With Breakfast Elyse Scott PA-C     • furosemide  20 mg Oral BID Elyse Scott PA-C     • heparin (porcine)  3-30 Units/kg/hr (Order-Specific) Intravenous Titrated Yvonne Aviles MD 18 Units/kg/hr (12/08/22 0411)   • heparin (porcine)  2,800 Units Intravenous Q1H PRN Yvonne Aviles MD     • heparin (porcine)  5,600 Units Intravenous Q1H PRN Yvonne Aviles MD     • insulin lispro  1-6 Units Subcutaneous TID AC Karen Monte PA-C     • insulin lispro  1-6 Units Subcutaneous HS Karen Monte PA-C     • ipratropium  0 5 mg Nebulization Q6H PRN Cally Gross MD     • levalbuterol  1 25 mg Nebulization Q6H PRN Cally Gross MD     • levofloxacin  750 mg Intravenous Q24H Billie Salas PA-C 750 mg (12/07/22 9054)   • metoprolol succinate  100 mg Oral Daily Karen Monte PA-C     • montelukast  10 mg Oral Daily Karen Monte PA-C     • ondansetron  4 mg Intravenous Q6H PRN Billie Salas PA-C     • pantoprazole  40 mg Oral Early Morning Karen Monte PA-C     • remdesivir  100 mg Intravenous Q24H Billie Salas PA-C     • valACYclovir  500 mg Oral Daily Billie Salas PA-C          Today, Patient Was Seen By: Matthew Parikh MD    ** Please Note: Dictation voice to text software may have been used in the creation of this document   **

## 2022-12-08 NOTE — PLAN OF CARE
Problem: PAIN - ADULT  Goal: Verbalizes/displays adequate comfort level or baseline comfort level  Description: Interventions:  - Encourage patient to monitor pain and request assistance  - Assess pain using appropriate pain scale  - Administer analgesics based on type and severity of pain and evaluate response  - Implement non-pharmacological measures as appropriate and evaluate response  - Consider cultural and social influences on pain and pain management  - Notify physician/advanced practitioner if interventions unsuccessful or patient reports new pain  Outcome: Progressing     Problem: INFECTION - ADULT  Goal: Absence or prevention of progression during hospitalization  Description: INTERVENTIONS:  - Assess and monitor for signs and symptoms of infection  - Monitor lab/diagnostic results  - Monitor all insertion sites, i e  indwelling lines, tubes, and drains  - Monitor endotracheal if appropriate and nasal secretions for changes in amount and color  - Harvey appropriate cooling/warming therapies per order  - Administer medications as ordered  - Instruct and encourage patient and family to use good hand hygiene technique  - Identify and instruct in appropriate isolation precautions for identified infection/condition  Outcome: Progressing  Goal: Absence of fever/infection during neutropenic period  Description: INTERVENTIONS:  - Monitor WBC    Outcome: Progressing     Problem: SAFETY ADULT  Goal: Patient will remain free of falls  Description: INTERVENTIONS:  - Educate patient/family on patient safety including physical limitations  - Instruct patient to call for assistance with activity   - Consult OT/PT to assist with strengthening/mobility   - Keep Call bell within reach  - Keep bed low and locked with side rails adjusted as appropriate  - Keep care items and personal belongings within reach  - Initiate and maintain comfort rounds  - Make Fall Risk Sign visible to staff  - Offer Toileting every 2 Hours, in advance of need  - Initiate/Maintain alarm  - Obtain necessary fall risk management equipment: socks  - Apply yellow socks and bracelet for high fall risk patients  - Consider moving patient to room near nurses station  Outcome: Progressing  Goal: Maintain or return to baseline ADL function  Description: INTERVENTIONS:  -  Assess patient's ability to carry out ADLs; assess patient's baseline for ADL function and identify physical deficits which impact ability to perform ADLs (bathing, care of mouth/teeth, toileting, grooming, dressing, etc )  - Assess/evaluate cause of self-care deficits   - Assess range of motion  - Assess patient's mobility; develop plan if impaired  - Assess patient's need for assistive devices and provide as appropriate  - Encourage maximum independence but intervene and supervise when necessary  - Involve family in performance of ADLs  - Assess for home care needs following discharge   - Consider OT consult to assist with ADL evaluation and planning for discharge  - Provide patient education as appropriate  Outcome: Progressing  Goal: Maintains/Returns to pre admission functional level  Description: INTERVENTIONS:  - Perform BMAT or MOVE assessment daily    - Set and communicate daily mobility goal to care team and patient/family/caregiver  - Collaborate with rehabilitation services on mobility goals if consulted  - Perform Range of Motion 2 times a day  - Reposition patient every 2 hours    - Dangle patient 2 times a day  - Stand patient 2 times a day  - Ambulate patient 2 times a day  - Out of bed to chair 2 times a day   - Out of bed for meals 2 times a day  - Out of bed for toileting  - Record patient progress and toleration of activity level   Outcome: Progressing     Problem: DISCHARGE PLANNING  Goal: Discharge to home or other facility with appropriate resources  Description: INTERVENTIONS:  - Identify barriers to discharge w/patient and caregiver  - Arrange for needed discharge resources and transportation as appropriate  - Identify discharge learning needs (meds, wound care, etc )  - Arrange for interpretive services to assist at discharge as needed  - Refer to Case Management Department for coordinating discharge planning if the patient needs post-hospital services based on physician/advanced practitioner order or complex needs related to functional status, cognitive ability, or social support system  Outcome: Progressing     Problem: Knowledge Deficit  Goal: Patient/family/caregiver demonstrates understanding of disease process, treatment plan, medications, and discharge instructions  Description: Complete learning assessment and assess knowledge base    Interventions:  - Provide teaching at level of understanding  - Provide teaching via preferred learning methods  Outcome: Progressing     Problem: Potential for Falls  Goal: Patient will remain free of falls  Description: INTERVENTIONS:  - Educate patient/family on patient safety including physical limitations  - Instruct patient to call for assistance with activity   - Consult OT/PT to assist with strengthening/mobility   - Keep Call bell within reach  - Keep bed low and locked with side rails adjusted as appropriate  - Keep care items and personal belongings within reach  - Initiate and maintain comfort rounds  - Make Fall Risk Sign visible to staff  - Offer Toileting every 2 Hours, in advance of need  - Initiate/Maintain alarm  - Obtain necessary fall risk management equipment: socks  - Apply yellow socks and bracelet for high fall risk patients  - Consider moving patient to room near nurses station  Outcome: Progressing

## 2022-12-08 NOTE — ASSESSMENT & PLAN NOTE
Wt Readings from Last 3 Encounters:   12/08/22 75 6 kg (166 lb 10 7 oz)   08/19/22 74 8 kg (164 lb 12 8 oz)   08/01/22 74 3 kg (163 lb 12 8 oz)     · Prior echo 5/2/2022: EF 51%, grade 2 diastolic dysfunction  · Monitor I's/O and daily weights  · Continue home Lasix

## 2022-12-09 VITALS
WEIGHT: 165.79 LBS | HEIGHT: 71 IN | OXYGEN SATURATION: 95 % | SYSTOLIC BLOOD PRESSURE: 133 MMHG | BODY MASS INDEX: 23.21 KG/M2 | HEART RATE: 76 BPM | TEMPERATURE: 98.1 F | DIASTOLIC BLOOD PRESSURE: 80 MMHG | RESPIRATION RATE: 18 BRPM

## 2022-12-09 LAB
ANION GAP SERPL CALCULATED.3IONS-SCNC: 9 MMOL/L (ref 4–13)
APTT PPP: 56 SECONDS (ref 23–37)
BASOPHILS # BLD AUTO: 0.02 THOUSANDS/ÂΜL (ref 0–0.1)
BASOPHILS NFR BLD AUTO: 0 % (ref 0–1)
BUN SERPL-MCNC: 14 MG/DL (ref 5–25)
CALCIUM SERPL-MCNC: 8.9 MG/DL (ref 8.3–10.1)
CHLORIDE SERPL-SCNC: 101 MMOL/L (ref 96–108)
CO2 SERPL-SCNC: 29 MMOL/L (ref 21–32)
CREAT SERPL-MCNC: 0.9 MG/DL (ref 0.6–1.3)
EOSINOPHIL # BLD AUTO: 0.01 THOUSAND/ÂΜL (ref 0–0.61)
EOSINOPHIL NFR BLD AUTO: 0 % (ref 0–6)
ERYTHROCYTE [DISTWIDTH] IN BLOOD BY AUTOMATED COUNT: 15 % (ref 11.6–15.1)
GFR SERPL CREATININE-BSD FRML MDRD: 83 ML/MIN/1.73SQ M
GLUCOSE SERPL-MCNC: 141 MG/DL (ref 65–140)
GLUCOSE SERPL-MCNC: 200 MG/DL (ref 65–140)
HCT VFR BLD AUTO: 34.2 % (ref 36.5–49.3)
HGB BLD-MCNC: 11.2 G/DL (ref 12–17)
IMM GRANULOCYTES # BLD AUTO: 0.16 THOUSAND/UL (ref 0–0.2)
IMM GRANULOCYTES NFR BLD AUTO: 1 % (ref 0–2)
LYMPHOCYTES # BLD AUTO: 1.46 THOUSANDS/ÂΜL (ref 0.6–4.47)
LYMPHOCYTES NFR BLD AUTO: 11 % (ref 14–44)
MCH RBC QN AUTO: 28.9 PG (ref 26.8–34.3)
MCHC RBC AUTO-ENTMCNC: 32.7 G/DL (ref 31.4–37.4)
MCV RBC AUTO: 88 FL (ref 82–98)
MONOCYTES # BLD AUTO: 1.12 THOUSAND/ÂΜL (ref 0.17–1.22)
MONOCYTES NFR BLD AUTO: 8 % (ref 4–12)
NEUTROPHILS # BLD AUTO: 10.49 THOUSANDS/ÂΜL (ref 1.85–7.62)
NEUTS SEG NFR BLD AUTO: 80 % (ref 43–75)
NRBC BLD AUTO-RTO: 0 /100 WBCS
PLATELET # BLD AUTO: 209 THOUSANDS/UL (ref 149–390)
PMV BLD AUTO: 10.2 FL (ref 8.9–12.7)
POTASSIUM SERPL-SCNC: 3.3 MMOL/L (ref 3.5–5.3)
PROCALCITONIN SERPL-MCNC: 0.88 NG/ML
RBC # BLD AUTO: 3.87 MILLION/UL (ref 3.88–5.62)
SODIUM SERPL-SCNC: 139 MMOL/L (ref 135–147)
WBC # BLD AUTO: 13.26 THOUSAND/UL (ref 4.31–10.16)

## 2022-12-09 PROCEDURE — 80048 BASIC METABOLIC PNL TOTAL CA: CPT | Performed by: PHYSICIAN ASSISTANT

## 2022-12-09 PROCEDURE — 84145 PROCALCITONIN (PCT): CPT | Performed by: PHYSICIAN ASSISTANT

## 2022-12-09 PROCEDURE — 85730 THROMBOPLASTIN TIME PARTIAL: CPT | Performed by: PHYSICIAN ASSISTANT

## 2022-12-09 PROCEDURE — 85025 COMPLETE CBC W/AUTO DIFF WBC: CPT | Performed by: PHYSICIAN ASSISTANT

## 2022-12-09 PROCEDURE — 99239 HOSP IP/OBS DSCHRG MGMT >30: CPT | Performed by: INTERNAL MEDICINE

## 2022-12-09 PROCEDURE — 82948 REAGENT STRIP/BLOOD GLUCOSE: CPT

## 2022-12-09 RX ORDER — LEVOFLOXACIN 500 MG/1
500 TABLET, FILM COATED ORAL DAILY
Qty: 5 TABLET | Refills: 0 | Status: SHIPPED | OUTPATIENT
Start: 2022-12-09 | End: 2022-12-14

## 2022-12-09 RX ORDER — POTASSIUM CHLORIDE 20 MEQ/1
40 TABLET, EXTENDED RELEASE ORAL ONCE
Status: COMPLETED | OUTPATIENT
Start: 2022-12-09 | End: 2022-12-09

## 2022-12-09 RX ORDER — PREDNISONE 20 MG/1
40 TABLET ORAL DAILY
Qty: 10 TABLET | Refills: 0 | Status: SHIPPED | OUTPATIENT
Start: 2022-12-09 | End: 2022-12-14

## 2022-12-09 RX ADMIN — HEPARIN SODIUM 2800 UNITS: 1000 INJECTION INTRAVENOUS; SUBCUTANEOUS at 06:11

## 2022-12-09 RX ADMIN — ASPIRIN 81 MG CHEWABLE TABLET 81 MG: 81 TABLET CHEWABLE at 09:07

## 2022-12-09 RX ADMIN — VALACYCLOVIR HYDROCHLORIDE 500 MG: 500 TABLET, FILM COATED ORAL at 09:11

## 2022-12-09 RX ADMIN — MONTELUKAST 10 MG: 10 TABLET, FILM COATED ORAL at 09:10

## 2022-12-09 RX ADMIN — METOPROLOL SUCCINATE 100 MG: 50 TABLET, EXTENDED RELEASE ORAL at 09:09

## 2022-12-09 RX ADMIN — ATORVASTATIN CALCIUM 40 MG: 40 TABLET, FILM COATED ORAL at 09:07

## 2022-12-09 RX ADMIN — FERROUS SULFATE TAB 325 MG (65 MG ELEMENTAL FE) 325 MG: 325 (65 FE) TAB at 09:19

## 2022-12-09 RX ADMIN — PANTOPRAZOLE SODIUM 40 MG: 40 TABLET, DELAYED RELEASE ORAL at 09:11

## 2022-12-09 RX ADMIN — DEXAMETHASONE SODIUM PHOSPHATE 6 MG: 4 INJECTION, SOLUTION INTRAMUSCULAR; INTRAVENOUS at 09:20

## 2022-12-09 RX ADMIN — FUROSEMIDE 20 MG: 20 TABLET ORAL at 09:08

## 2022-12-09 RX ADMIN — POTASSIUM CHLORIDE 40 MEQ: 1500 TABLET, EXTENDED RELEASE ORAL at 09:10

## 2022-12-09 NOTE — ASSESSMENT & PLAN NOTE
· Receives chemotherapy through [facility]   Last treatment 1 week ago  · Patient's wife will bring in a list with the chemo medication names  · PET scan scheduled outpatient for 12/12/2022

## 2022-12-09 NOTE — ASSESSMENT & PLAN NOTE
· Presented due to SOB, productive cough and fevers at home  Cough has been ongoing for about 1 week  · Patient was on 2 L nasal cannula  Currently on room air  · Meeting sepsis criteria due to tachycardia, fever 100 6, WBC 12 90  Covid 19 positive   · Lactic 2 4  · Procal wnl  · Chest x-ray- Extensive right consolidation   · Covid labs   · CK: 29  · BNP: 1542  · Troponin: 12  · CRP: 26 8  · D-dimer: 2 16  · On IV Levaquin  · Procalcitonin is 1 59  Trend procalcitonin  · Blood cultures negative to date  · Continue IV remdesivir and dexamethasone   · Patient had CT chest which did not reveal any PE  Venous duplex of lower extremity was negative for DVT  · Patient is saturating well on room air  Patient had pulse ox with ambulation done and did not require home O2 on discharge  · Will discontinue heparin drip, remdesivir  · Will be switched to Levaquin to complete the course  · Patient is hemodynamically stable for discharge    Patient did not require any home care services

## 2022-12-09 NOTE — ASSESSMENT & PLAN NOTE
Wt Readings from Last 3 Encounters:   12/09/22 75 2 kg (165 lb 12 6 oz)   08/19/22 74 8 kg (164 lb 12 8 oz)   08/01/22 74 3 kg (163 lb 12 8 oz)     · Prior echo 5/2/2022: EF 84%, grade 2 diastolic dysfunction  · Monitor I's/O and daily weights  · Continue home Lasix

## 2022-12-09 NOTE — ASSESSMENT & PLAN NOTE
Lab Results   Component Value Date    HGBA1C 6 6 (H) 07/27/2022       Recent Labs     12/08/22  1144 12/08/22  1649 12/08/22 2024 12/09/22  0733   POCGLU 281* 320* 261* 141*       Blood Sugar Average: Last 72 hrs:  · (P) 221Hold home oral hypoglycemics  · SSI

## 2022-12-09 NOTE — ASSESSMENT & PLAN NOTE
· Dx with covid  On 2L NC in the ER  · Currently on room air  · Respiratory protocol  · IV dexamethasone and remdesivir    DC remdesivir  · Patient had pulse ox with ambulation done and did not require home O2 on discharge

## 2022-12-09 NOTE — INCIDENTAL FINDINGS
The following findings require follow up:  Radiographic finding   Finding: Intestinal lymphadenopathy, lesion on right shoulder, patchy nodular airspace opacities in the right lung   Follow up required: Repeat CT chest in 3 months and patient is scheduled for PET scan next week   Follow up should be done within 1 week(s)    Please notify the following clinician to assist with the follow up:   Dr Ping Pablo and PCP

## 2022-12-09 NOTE — DISCHARGE INSTR - AVS FIRST PAGE
Follow-up with PCP in 1 week  Repeat CT chest in 3 months with PCP    Continue to quarantine as per CDC guidelines  Return to ER with any worsening fever, chills, shortness of breath, chest pain, palpitation, dizziness or any other alarming symptoms

## 2022-12-09 NOTE — DISCHARGE SUMMARY
[place]  Discharge- Pablo Ramirez 1945, 77 y o  male MRN: [id number]  Unit/Bed#: -01 Encounter: [id number]  Primary Care Provider: Tiffanie Mosqueda DO   Date and time admitted to hospital: 12/7/2022  3:39 AM    Acute respiratory failure with hypoxia [facility])  Assessment & Plan    · Dx with covid  On 2L NC in the ER  · Currently on room air  · Respiratory protocol  · IV dexamethasone and remdesivir  DC remdesivir  · Patient had pulse ox with ambulation done and did not require home O2 on discharge      Paroxysmal atrial fibrillation (HCC)  Assessment & Plan  · Continue home metoprolol succinate  Not on anticoagulation    Chronic diastolic congestive heart failure (HCC)  Assessment & Plan  Wt Readings from Last 3 Encounters:   12/09/22 75 2 kg (165 lb 12 6 oz)   08/19/22 74 8 kg (164 lb 12 8 oz)   08/01/22 74 3 kg (163 lb 12 8 oz)     · Prior echo 5/2/2022: EF 50%, grade 2 diastolic dysfunction  · Monitor I's/O and daily weights  · Continue home Lasix        Multiple myeloma not having achieved remission ([facility] [address] )  Assessment & Plan  · Receives chemotherapy through [facility]   Last treatment 1 week ago  · Patient's wife will bring in a list with the chemo medication names  · PET scan scheduled outpatient for 12/12/2022    Type 2 diabetes mellitus without complication, without long-term current use of insulin [facility])  Assessment & Plan  Lab Results   Component Value Date    HGBA1C 6 6 (H) 07/27/2022       Recent Labs     12/08/22  1144 12/08/22  1649 12/08/22 2024 12/09/22  0733   POCGLU 281* 320* 261* 141*       Blood Sugar Average: Last 72 hrs:  · (P) 221Hold home oral hypoglycemics  · SSI    * Sepsis due to COVID-19 [facility])  Assessment & Plan  · Presented due to SOB, productive cough and fevers at home  Cough has been ongoing for about 1 week  · Patient was on 2 L nasal cannula    Currently on room air  · Meeting sepsis criteria due to tachycardia, fever 100 6, WBC 12 90  Covid 19 positive   · Lactic 2 4  · Procal wnl  · Chest x-ray- Extensive right consolidation   · Covid labs   · CK: 29  · BNP: 1542  · Troponin: 12  · CRP: 26 8  · D-dimer: 2 16  · On IV Levaquin  · Procalcitonin is 1 59  Trend procalcitonin  · Blood cultures negative to date  · Continue IV remdesivir and dexamethasone   · Patient had CT chest which did not reveal any PE  Venous duplex of lower extremity was negative for DVT  · Patient is saturating well on room air  Patient had pulse ox with ambulation done and did not require home O2 on discharge  · Will discontinue heparin drip, remdesivir  · Will be switched to Levaquin to complete the course  · Patient is hemodynamically stable for discharge  Patient did not require any home care services      Hospital Course:     Virginia Barrera is a 77 y o  male patient who originally presented to the hospital on   Admission Orders (From admission, onward)     Ordered        12/07/22 0507  INPATIENT ADMISSION  Once                     due to shortness of breath, productive cough and fever  Patient has past medical history of PAF, diabetes, CHF, multiple myeloma presented to the emergency department with shortness of breath, productive cough and fever  Patient was diagnosed with COVID in the ER patient has prior admission COVID in May 2021 requiring high flow oxygen  In ER patient was hypoxic and was placed on 2 L of supplemental oxygen  Patient was mated with sepsis secondary to COVID-19, acute respiratory failure with hypoxia patient was started on IV remdesivir, dexamethasone, heparin drip  CT chest and venous duplex of lower extremity were negative for clot  Heparin drip is discontinued  Patient started on Levaquin  Cultures are negative to date  Patient was weaned off on room air  Will be discharged on Levaquin and prednisone for 5 days  Patient had pulse ox with ambulation done and did not require home O2 on discharge    Patient was seen by physical therapy and  and did not require home care services  On Exam-  Chest-clear to auscultation  Abdomen-soft, nontender  Heart-S1 S2 regular  Extremities-no pedal edema or calf tenderness  Neuro-alert awake oriented x3  No focal deficits    Please see above list of diagnoses and related plan for additional information  Follow-up with PCP in 1 week  Continue to quarantine as per CDC guidelines  Return to ER with any worsening fever, chills, shortness of breath, chest pain, palpitation, dizziness or any other alarming symptoms    Condition at Discharge:  good      Discharge instructions/Information to patient and family:   See after visit summary for information provided to patient and family  Provisions for Follow-Up Care:  See after visit summary for information related to follow-up care and any pertinent home health orders  Disposition:     Home       Discharge Statement:  I spent 40 minutes discharging the patient  This time was spent on the day of discharge  I had direct contact with the patient on the day of discharge  Greater than 50% of the total time was spent examining patient, answering all patient questions, arranging and discussing plan of care with patient as well as directly providing post-discharge instructions  Additional time then spent on discharge activities  Discharge Medications:  See after visit summary for reconciled discharge medications provided to patient and family        ** Please Note: This note has been constructed using a voice recognition system **

## 2022-12-09 NOTE — PLAN OF CARE
Problem: SAFETY ADULT  Goal: Patient will remain free of falls  Description: INTERVENTIONS:  - Educate patient/family on patient safety including physical limitations  - Instruct patient to call for assistance with activity   - Consult OT/PT to assist with strengthening/mobility   - Keep Call bell within reach  - Keep bed low and locked with side rails adjusted as appropriate  - Keep care items and personal belongings within reach  - Initiate and maintain comfort rounds  - Make Fall Risk Sign visible to staff  - Offer Toileting every 2 Hours, in advance of need  - Initiate/Maintain alarm  - Obtain necessary fall risk management equipment: socks  - Apply yellow socks and bracelet for high fall risk patients  - Consider moving patient to room near nurses station  Outcome: Progressing  Goal: Maintain or return to baseline ADL function  Description: INTERVENTIONS:  -  Assess patient's ability to carry out ADLs; assess patient's baseline for ADL function and identify physical deficits which impact ability to perform ADLs (bathing, care of mouth/teeth, toileting, grooming, dressing, etc )  - Assess/evaluate cause of self-care deficits   - Assess range of motion  - Assess patient's mobility; develop plan if impaired  - Assess patient's need for assistive devices and provide as appropriate  - Encourage maximum independence but intervene and supervise when necessary  - Involve family in performance of ADLs  - Assess for home care needs following discharge   - Consider OT consult to assist with ADL evaluation and planning for discharge  - Provide patient education as appropriate  Outcome: Progressing  Goal: Maintains/Returns to pre admission functional level  Description: INTERVENTIONS:  - Perform BMAT or MOVE assessment daily    - Set and communicate daily mobility goal to care team and patient/family/caregiver     - Collaborate with rehabilitation services on mobility goals if consulted  - Perform Range of Motion 2 times a day   - Reposition patient every 2 hours    - Dangle patient 2 times a day  - Stand patient 2 times a day  - Ambulate patient 2 times a day  - Out of bed to chair 2 times a day   - Out of bed for meals 2 times a day  - Out of bed for toileting  - Record patient progress and toleration of activity level   Outcome: Progressing

## 2022-12-09 NOTE — NURSING NOTE
Patient ambulated approx 75ft in room, 92% SpO2 on room air  Patient denies SOB or difficulty breasthing

## 2022-12-09 NOTE — PLAN OF CARE
Problem: PAIN - ADULT  Goal: Verbalizes/displays adequate comfort level or baseline comfort level  Description: Interventions:  - Encourage patient to monitor pain and request assistance  - Assess pain using appropriate pain scale  - Administer analgesics based on type and severity of pain and evaluate response  - Implement non-pharmacological measures as appropriate and evaluate response  - Consider cultural and social influences on pain and pain management  - Notify physician/advanced practitioner if interventions unsuccessful or patient reports new pain  Outcome: Completed     Problem: INFECTION - ADULT  Goal: Absence or prevention of progression during hospitalization  Description: INTERVENTIONS:  - Assess and monitor for signs and symptoms of infection  - Monitor lab/diagnostic results  - Monitor all insertion sites, i e  indwelling lines, tubes, and drains  - Monitor endotracheal if appropriate and nasal secretions for changes in amount and color  - [place] appropriate cooling/warming therapies per order  - Administer medications as ordered  - Instruct and encourage patient and family to use good hand hygiene technique  - Identify and instruct in appropriate isolation precautions for identified infection/condition  Outcome: Completed  Goal: Absence of fever/infection during neutropenic period  Description: INTERVENTIONS:  - Monitor WBC    Outcome: Completed     Problem: SAFETY ADULT  Goal: Patient will remain free of falls  Description: INTERVENTIONS:  - Educate patient/family on patient safety including physical limitations  - Instruct patient to call for assistance with activity   - Consult OT/PT to assist with strengthening/mobility   - Keep Call bell within reach  - Keep bed low and locked with side rails adjusted as appropriate  - Keep care items and personal belongings within reach  - Initiate and maintain comfort rounds  - Make Fall Risk Sign visible to staff  - Offer Toileting every 2 Hours, in advance of need  - Initiate/Maintain alarm  - Obtain necessary fall risk management equipment: socks  - Apply yellow socks and bracelet for high fall risk patients  - Consider moving patient to room near nurses station  Outcome: Completed  Goal: Maintain or return to baseline ADL function  Description: INTERVENTIONS:  -  Assess patient's ability to carry out ADLs; assess patient's baseline for ADL function and identify physical deficits which impact ability to perform ADLs (bathing, care of mouth/teeth, toileting, grooming, dressing, etc )  - Assess/evaluate cause of self-care deficits   - Assess range of motion  - Assess patient's mobility; develop plan if impaired  - Assess patient's need for assistive devices and provide as appropriate  - Encourage maximum independence but intervene and supervise when necessary  - Involve family in performance of ADLs  - Assess for home care needs following discharge   - Consider OT consult to assist with ADL evaluation and planning for discharge  - Provide patient education as appropriate  Outcome: Completed  Goal: Maintains/Returns to pre admission functional level  Description: INTERVENTIONS:  - Perform BMAT or MOVE assessment daily    - Set and communicate daily mobility goal to care team and patient/family/caregiver  - Collaborate with rehabilitation services on mobility goals if consulted  - Perform Range of Motion 2 times a day  - Reposition patient every 2 hours    - Dangle patient 2 times a day  - Stand patient 2 times a day  - Ambulate patient 2 times a day  - Out of bed to chair 2 times a day   - Out of bed for meals 2 times a day  - Out of bed for toileting  - Record patient progress and toleration of activity level   Outcome: Completed     Problem: DISCHARGE PLANNING  Goal: Discharge to home or other facility with appropriate resources  Description: INTERVENTIONS:  - Identify barriers to discharge w/patient and caregiver  - Arrange for needed discharge resources and transportation as appropriate  - Identify discharge learning needs (meds, wound care, etc )  - Arrange for interpretive services to assist at discharge as needed  - Refer to Case Management Department for coordinating discharge planning if the patient needs post-hospital services based on physician/advanced practitioner order or complex needs related to functional status, cognitive ability, or social support system  Outcome: Completed     Problem: Knowledge Deficit  Goal: Patient/family/caregiver demonstrates understanding of disease process, treatment plan, medications, and discharge instructions  Description: Complete learning assessment and assess knowledge base    Interventions:  - Provide teaching at level of understanding  - Provide teaching via preferred learning methods  Outcome: Completed     Problem: Potential for Falls  Goal: Patient will remain free of falls  Description: INTERVENTIONS:  - Educate patient/family on patient safety including physical limitations  - Instruct patient to call for assistance with activity   - Consult OT/PT to assist with strengthening/mobility   - Keep Call bell within reach  - Keep bed low and locked with side rails adjusted as appropriate  - Keep care items and personal belongings within reach  - Initiate and maintain comfort rounds  - Make Fall Risk Sign visible to staff  - Offer Toileting every 2 Hours, in advance of need  - Initiate/Maintain alarm  - Obtain necessary fall risk management equipment: socks  - Apply yellow socks and bracelet for high fall risk patients  - Consider moving patient to room near nurses station  Outcome: Completed

## 2022-12-09 NOTE — DISCHARGE INSTRUCTIONS
Follow-up with PCP in 1 week  Repeat CT chest in 3 months with PCP  Continue to quarantine as per CDC guidelines  Return to ER with any worsening fever, chills, shortness of breath, chest pain, palpitation, dizziness or any other alarming symptoms    COVID-19 Home Care Guidelines    Your healthcare provider and/or public health staff have evaluated you and have determined that you do not need to remain in the hospital at this time  At this time you can be isolated at home where you will be monitored by staff from your local or state health department  You should carefully follow the prevention and isolation steps below until a healthcare provider or local or state health department says that you can return to your normal activities  Stay home except to get medical care    People who are mildly ill with COVID-19 are able to isolate at home during their illness  You should restrict activities outside your home, except for getting medical care  Do not go to work, school, or public areas  Avoid using public transportation, ride-sharing, or taxis  Separate yourself from other people and animals in your home    People: As much as possible, you should stay in a specific room and away from other people in your home  Also, you should use a separate bathroom, if available  Animals: You should restrict contact with pets and other animals while you are sick with COVID-19, just like you would around other people  Although there have not been reports of pets or other animals becoming sick with COVID-19, it is still recommended that people sick with COVID-19 limit contact with animals until more information is known about the virus  When possible, have another member of your household care for your animals while you are sick  If you are sick with COVID-19, avoid contact with your pet, including petting, snuggling, being kissed or licked, and sharing food   If you must care for your pet or be around animals while you are sick, wash your hands before and after you interact with pets and wear a facemask  See COVID-19 and Animals for more information  Call ahead before visiting your doctor    If you have a medical appointment, call the healthcare provider and tell them that you have or may have COVID-19  This will help the healthcare provider’s office take steps to keep other people from getting infected or exposed  Wear a facemask    You should wear a facemask when you are around other people (e g , sharing a room or vehicle) or pets and before you enter a healthcare provider’s office  If you are not able to wear a facemask (for example, because it causes trouble breathing), then people who live with you should not stay in the same room with you, or they should wear a facemask if they enter your room  Cover your coughs and sneezes    Cover your mouth and nose with a tissue when you cough or sneeze  Throw used tissues in a lined trash can  Immediately wash your hands with soap and water for at least 20 seconds or, if soap and water are not available, clean your hands with an alcohol-based hand  that contains at least 60% alcohol  Clean your hands often    Wash your hands often with soap and water for at least 20 seconds, especially after blowing your nose, coughing, or sneezing; going to the bathroom; and before eating or preparing food  If soap and water are not readily available, use an alcohol-based hand  with at least 60% alcohol, covering all surfaces of your hands and rubbing them together until they feel dry  Soap and water are the best option if hands are visibly dirty  Avoid touching your eyes, nose, and mouth with unwashed hands  Avoid sharing personal household items    You should not share dishes, drinking glasses, cups, eating utensils, towels, or bedding with other people or pets in your home  After using these items, they should be washed thoroughly with soap and water      Clean all “high-touch” surfaces everyday    High touch surfaces include counters, tabletops, doorknobs, bathroom fixtures, toilets, phones, keyboards, tablets, and bedside tables  Also, clean any surfaces that may have blood, stool, or body fluids on them  Use a household cleaning spray or wipe, according to the label instructions  Labels contain instructions for safe and effective use of the cleaning product including precautions you should take when applying the product, such as wearing gloves and making sure you have good ventilation during use of the product  Monitor your symptoms    Seek prompt medical attention if your illness is worsening (e g , difficulty breathing)  Before seeking care, call your healthcare provider and tell them that you have, or are being evaluated for, COVID-19  Put on a facemask before you enter the facility  These steps will help the healthcare provider’s office to keep other people in the office or waiting room from getting infected or exposed  Ask your healthcare provider to call the local or [facility] health department  Persons who are placed under active monitoring or facilitated self-monitoring should follow instructions provided by their local health department or occupational health professionals, as appropriate  If you have a medical emergency and need to call 911, notify the dispatch personnel that you have, or are being evaluated for COVID-19  If possible, put on a facemask before emergency medical services arrive  Discontinuing home isolation    Patients with confirmed COVID-19 should remain under home isolation precautions until the following conditions are met:    They have had no fever for at least 24 hours (that is one full day of no fever without the use medicine that reduces fevers)  AND  other symptoms have improved (for example, when their cough or shortness of breath have improved)  AND  If had mild or moderate illness, at least 10 days have passed since their symptoms first appeared or if severe illness (needed oxygen) or immunosuppressed, at least 20 days have passed since symptoms first appeared  Patients with confirmed COVID-19 should also notify close contacts (including their workplace) and ask that they self-quarantine  Currently, close contact is defined as being within 6 feet for 15 minutes or more from the period 24 hours starting 48 hours before symptom onset to the time at which the patient went into isolation  Close contacts of patients diagnosed with COVID-19 should be instructed by the patient to self-quarantine for 14 days from the last time of their last contact with the patient       Source: [employer]

## 2022-12-11 LAB
BACTERIA BLD CULT: NORMAL
BACTERIA BLD CULT: NORMAL

## 2022-12-12 ENCOUNTER — TRANSITIONAL CARE MANAGEMENT (OUTPATIENT)
Dept: FAMILY MEDICINE CLINIC | Facility: HOSPITAL | Age: 77
End: 2022-12-12

## 2022-12-12 LAB
BACTERIA BLD CULT: NORMAL
BACTERIA BLD CULT: NORMAL

## 2022-12-12 NOTE — UTILIZATION REVIEW
Initial Clinical Review    Admission: Date/Time/Statement:   Admission Orders (From admission, onward)     Ordered        12/07/22 0507  INPATIENT ADMISSION  Once                      Orders Placed This Encounter   Procedures   • INPATIENT ADMISSION     Standing Status:   Standing     Number of Occurrences:   1     Order Specific Question:   Level of Care     Answer:   Med Surg [16]     Order Specific Question:   Estimated length of stay     Answer:   More than 2 Midnights     Order Specific Question:   Certification     Answer:   I certify that inpatient services are medically necessary for this patient for a duration of greater than two midnights  See H&P and MD Progress Notes for additional information about the patient's course of treatment  ED Arrival Information     Expected   -    Arrival   12/7/2022 03:35    Acuity   Emergent            Means of arrival   Walk-In    Escorted by   Self    Service   Hospitalist    Admission type   Emergency            Arrival complaint   short of breath and spiting up blood           Chief Complaint   Patient presents with   • Shortness of Breath       Initial Presentation: 77 y o  male from home to ED admitted inpatient due to Sepsis due to COVID 19/acute respiratory failure with hypoxia  PMH of PAF, type 2 diabetes, CHF, multiple myeloma on chemo  Presented due to fever, shortness of breath starting evening prior to arrival,    productive cough of blood tinged sputum starting week prior  On exam: lungs rales  SARS-COV-2 positive   K 3 3  Lactic acid 2 4  NT-proBNP 1542  Wbc 12 90  D dimer 1 58  In the ED given Levaquin and IVF bolus, placed on oxygen for hypoxia  Plan is start Remdesivir and Decadron  Start Heparin gtt, check ct chest   Follow cultures  Oxygen as needed  Continue Levaquin  Date: 12/8/22   Day 2:  No new complaints  On exam diminished breath sounds  Incentive spirometry 2000 ml with goal of same  Procalcitonin 1 59    Glucose 143, 281   On oxygen  Wean oxygen as able  Continue Decadron and remdesivir  Heparin gtt in progress  Continue IV Levaquin  ED Triage Vitals   Temperature Pulse Respirations Blood Pressure SpO2   12/07/22 0344 12/07/22 0344 12/07/22 0344 12/07/22 0344 12/07/22 0344   (!) 100 6 °F (38 1 °C) 99 18 161/72 92 %      Temp Source Heart Rate Source Patient Position - Orthostatic VS BP Location FiO2 (%)   12/07/22 0344 12/07/22 0608 12/07/22 0608 12/07/22 0608 --   Oral Monitor Lying Left arm       Pain Score       12/07/22 0608       No Pain          Wt Readings from Last 1 Encounters:   12/09/22 75 2 kg (165 lb 12 6 oz)     Additional Vital Signs:   12/08/22 08:15:46 98 3 °F (36 8 °C) 79 16 128/58 81 94 % -- -- -- --   12/07/22 21:48:07 98 6 °F (37 °C) 75 -- 125/56 79 93 % -- -- -- --   12/07/22 17:55:46 -- 78 -- 155/76 102 95 % -- -- -- --   12/07/22 1038 -- 77 -- -- -- 94 % -- -- -- --   12/07/22 0956 -- -- -- -- -- -- -- -- None (Room air) --   12/07/22 06:50:23 100 2 °F (37 9 °C) 87 -- 131/61 84 97 % -- -- -- --   12/07/22 0608 -- 92 24 Abnormal  100/77 85 97 % 28 2 L/min Nasal cannula Lying   12/07/22 0510 -- 88 -- 118/58 84 95 % -- -- -- --   12/07/22 0455 -- 87 -- 122/59 85 94 % -- --       Pertinent Labs/Diagnostic Test Results:   VAS lower limb venous duplex study, complete bilateral   Final Result by Chato Chen DO (12/07 5036)      CTA chest pe study   Final Result by Kathrin Scheuermann, MD (12/07 1318)      1  No pulmonary embolism  2   New patchy nodular airspace opacities in the right upper lobe and superior right lower lobe since PET/CT 8/10/2022, consistent with an infectious/inflammatory etiology  Recommend follow-up CT chest in 3 months to assess for resolution  3   Persistent mediastinal lymphadenopathy 4/19/2022, thelma metastases cannot be excluded        4   Severe degenerative changes of the right shoulder with a 1 9 cm lytic lesion in the right glenoid, stable since 4/19/2022 but increased in size since 5/6/2021  This finding is indeterminate and may be related to degenerative changes versus multiple    myeloma  6   Improved small left pleural effusion with adjacent compressive atelectasis since PET/CT 8/10/2022  The study was marked in [facility] for immediate notification  Workstation performed: [id number]         XR chest 1 view portable   ED Interpretation by Anastasia Harrell DO (12/07 0430)   R sided infiltrate      Final Result by Helen Santillan MD (12/07 1480)      Extensive right consolidation which could be due to pneumonia or asymmetric pulmonary edema  Workstation performed: [id number]           12/7/22 ecg The EKG demonstrates:  Rhythm: normal sinus  Intervals: normal intervals  Axis: normal axis  QRS/Blocks: normal QRS  ST Changes:No acute ST Changes, no STD/MIGUEL ANGEL     12/7/22 venous duplex - RIGHT LOWER LIMB:  No evidence of acute or chronic deep vein thrombosis  No evidence of superficial thrombophlebitis noted  Doppler evaluation shows a normal response to augmentation maneuvers  Popliteal, posterior tibial and anterior tibial arterial Doppler waveforms are  biphasic  LEFT LOWER LIMB:  No evidence of acute or chronic deep vein thrombosis  No evidence of superficial thrombophlebitis noted  Doppler evaluation shows a normal response to augmentation maneuvers  Popliteal, posterior tibial and anterior tibial arterial Doppler waveforms are  biphasic      Results from last 7 days   Lab Units 12/07/22  0427   SARS-COV-2  Positive*     Results from last 7 days   Lab Units 12/09/22  0309 12/08/22  0153 12/07/22  0805 12/07/22  0418   WBC Thousand/uL 13 26* 16 45*  --  12 90*   HEMOGLOBIN g/dL 11 2* 11 2*  --  12 8   HEMATOCRIT % 34 2* 33 8*  --  38 3   PLATELETS Thousands/uL 209 187 188 209   NEUTROS ABS Thousands/µL 10 49* 13 53*  --  10 51*     Results from last 7 days   Lab Units 12/09/22  0309 12/08/22  0153 12/07/22  0418   SODIUM mmol/L 139 136 140   POTASSIUM mmol/L 3 3* 3 6 3 3*   CHLORIDE mmol/L 101 101 101   CO2 mmol/L 29 29 28   ANION GAP mmol/L 9 6 11   BUN mg/dL 14 12 11   CREATININE mg/dL 0 90 0 86 1 07   EGFR ml/min/1 73sq m 83 84 67   CALCIUM mg/dL 8 9 8 8 8 7   MAGNESIUM mg/dL  --  1 8  --      Results from last 7 days   Lab Units 12/08/22  0153 12/07/22  0418   AST U/L 11 14   ALT U/L 14 19   ALK PHOS U/L 90 139*   TOTAL PROTEIN g/dL 6 1* 7 0   ALBUMIN g/dL 2 6* 3 2*   TOTAL BILIRUBIN mg/dL 0 50 0 50     Results from last 7 days   Lab Units 12/09/22  0733 12/08/22  2024 12/08/22  1649 12/08/22  1144 12/08/22  0758 12/07/22  2151 12/07/22  1751 12/07/22  1211 12/07/22  0754   POC GLUCOSE mg/dl 141* 261* 320* 281* 143* 241* 237* 198* 167*     Results from last 7 days   Lab Units 12/09/22  0309 12/08/22  0153 12/07/22  0418   GLUCOSE RANDOM mg/dL 200* 173* 205*     Results from last 7 days   Lab Units 12/07/22  0805   CK TOTAL U/L 29*     Results from last 7 days   Lab Units 12/07/22  1009 12/07/22  0619 12/07/22  0418   HS TNI 0HR ng/L  --   --  12   HS TNI 2HR ng/L  --  20  --    HSTNI D2 ng/L  --  8  --    HS TNI 4HR ng/L 16  --   --    HSTNI D4 ng/L 4  --   --      Results from last 7 days   Lab Units 12/07/22  0805 12/07/22  0418   D-DIMER QUANTITATIVE ug/ml FEU 1 58* 2 16*     Results from last 7 days   Lab Units 12/09/22  0309 12/08/22  0153 12/07/22  2000 12/07/22  1246 12/07/22  0418   PROTIME seconds  --   --   --  15 4* 14 2   INR   --   --   --  1 14 1 02   PTT seconds 56* 79* 86* 32 26     Results from last 7 days   Lab Units 12/09/22  0309 12/08/22  0153 12/07/22  0453   PROCALCITONIN ng/ml 0 88* 1 59* 0 11     Results from last 7 days   Lab Units 12/07/22  0805 12/07/22  0418   LACTIC ACID mmol/L 1 0 2 4*     Results from last 7 days   Lab Units 12/07/22  0418   NT-PRO BNP pg/mL 1,542*     Results from last 7 days   Lab Units 12/07/22  0805   CRP mg/L 26 8*     Results from last 7 days   Lab Units 12/08/22  0058   CLARITY UA Clear   COLOR UA  Yellow   SPEC GRAV UA  <1 005*   PH UA  7 0   GLUCOSE UA mg/dl 1000 (1%)*   KETONES UA mg/dl Negative   BLOOD UA  Negative   PROTEIN UA mg/dl Negative   NITRITE UA  Negative   BILIRUBIN UA  Negative   UROBILINOGEN UA (BE) mg/dl <2 0   LEUKOCYTES UA  Negative     Results from last 7 days   Lab Units 12/07/22  0619 12/07/22  0427   STREP PNEUMONIAE ANTIGEN, URINE  Negative  --    LEGIONELLA URINARY ANTIGEN  Negative  --    INFLUENZA A PCR   --  Negative   INFLUENZA B PCR   --  Negative   RSV PCR   --  Negative     Results from last 7 days   Lab Units 12/07/22  0418   BLOOD CULTURE  No Growth After 4 Days  No Growth After 4 Days         ED Treatment:   Medication Administration from 12/07/2022 0335 to 12/07/2022 0640       Date/Time Order Dose Route Action Comments     12/07/2022 0441 EST levofloxacin (LEVAQUIN) IVPB (premix in dextrose) 750 mg 150 mL 750 mg Intravenous New Bag --     12/07/2022 0549 EST sodium chloride 0 9 % bolus 500 mL 500 mL Intravenous New Bag --        Past Medical History:   Diagnosis Date   • Angina pectoris ([facility] )    • Arthritis    • Atrial fibrillation ([facility] )    • Cataract    • Colitis    • Colon polyp    • Coronary artery disease 2014   • Diabetes mellitus ([facility] )    • Fatty liver    • GERD (gastroesophageal reflux disease)    • History of chemotherapy    • History of radiation therapy    • History of shingles 2018   • History of transfusion    • Hyperlipidemia    • Hypertension    • 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[facility] )    • Pneumonia 08/11/22   • Skin cancer      Present on Admission:  • Multiple myeloma not having achieved remission ([facility] )  • Acute respiratory failure with hypoxia (HCC)  • Paroxysmal atrial fibrillation (HCC)  • Type 2 diabetes mellitus without complication, without long-term current use of insulin (HCC)  • Chronic diastolic congestive heart failure (HCC)      Admitting Diagnosis: Shortness of breath [R06 02]  Acute respiratory insufficiency [R06 89]  Pneumonia [J18 9]  Severe sepsis ([facility] ) [A41 9, R65 20]  Sepsis due to COVID-19 ([facility] ) [U07 1, A41 89]  Age/Sex: 77 y o  male  Admission Orders:  Scheduled Medications:  aspirin, 81 mg, Oral, Daily  atorvastatin, 40 mg, Oral, Daily  dexamethasone, 6 mg, Intravenous, Q24H  ferrous sulfate, 325 mg, Oral, Daily With Breakfast  furosemide, 20 mg, Oral, BID  insulin lispro, 1-6 Units, Subcutaneous, TID AC  insulin lispro, 1-6 Units, Subcutaneous, HS  levofloxacin, 750 mg, Intravenous, Q24H  metoprolol succinate, 100 mg, Oral, Daily  montelukast, 10 mg, Oral, Daily  pantoprazole, 40 mg, Oral, Early Morning  remdesivir, 100 mg, Intravenous, Q24H  valACYclovir, 500 mg, Oral, Daily      Continuous IV Infusions:  heparin (porcine), 3-30 Units/kg/hr (Order-Specific), Intravenous, Titrated      PRN Meds:  acetaminophen, 650 mg, Oral, Q6H PRN  benzonatate, 100 mg, Oral, TID PRN  heparin (porcine), 2,800 Units, Intravenous, Q1H PRN  heparin (porcine), 5,600 Units, Intravenous, Q1H PRN  ipratropium, 0 5 mg, Nebulization, Q6H PRN  levalbuterol, 1 25 mg, Nebulization, Q6H PRN  ondansetron, 4 mg, Intravenous, Q6H PRN        Network Utilization Review Department  ATTENTION: Please call with any questions or concerns to [phone] and carefully listen to the prompts so that you are directed to the right person  All voicemails are confidential   Georges Jensen all requests for admission clinical reviews, approved or denied determinations and any other requests to dedicated fax number below belonging to the campus where the patient is receiving treatment   List of dedicated fax numbers for the Facilities:  [address] DENIALS (Administrative/Medical Necessity) [phone]   [address] (Maternity/NICU/Pediatrics) [place] [facility] [phone]   [facility] [phone]   [place] [phone]   [address] [place] [address] 150 [facility] [place] [place] [address] [facility] [address] [facility] [address] [phone]

## 2022-12-26 DIAGNOSIS — R07.9 CHEST PAIN: ICD-10-CM

## 2023-01-02 RX ORDER — FUROSEMIDE 20 MG/1
TABLET ORAL
Qty: 180 TABLET | Refills: 0 | Status: SHIPPED | OUTPATIENT
Start: 2023-01-02

## 2023-01-26 DIAGNOSIS — E11.9 TYPE 2 DIABETES MELLITUS WITHOUT COMPLICATION, WITHOUT LONG-TERM CURRENT USE OF INSULIN (HCC): ICD-10-CM

## 2023-01-26 PROBLEM — J96.01 ACUTE RESPIRATORY FAILURE WITH HYPOXIA (HCC): Status: RESOLVED | Noted: 2022-04-20 | Resolved: 2023-01-26

## 2023-01-26 PROBLEM — F17.200 SMOKING: Status: RESOLVED | Noted: 2018-05-25 | Resolved: 2023-01-26

## 2023-01-26 PROBLEM — U07.1 PNEUMONIA DUE TO COVID-19 VIRUS: Status: RESOLVED | Noted: 2021-04-07 | Resolved: 2023-01-26

## 2023-01-26 PROBLEM — J12.82 PNEUMONIA DUE TO COVID-19 VIRUS: Status: RESOLVED | Noted: 2021-04-07 | Resolved: 2023-01-26

## 2023-01-26 PROBLEM — IMO0001 SMOKING: Status: RESOLVED | Noted: 2018-05-25 | Resolved: 2023-01-26

## 2023-01-26 RX ORDER — METFORMIN HYDROCHLORIDE 500 MG/1
500 TABLET, EXTENDED RELEASE ORAL 2 TIMES DAILY WITH MEALS
Qty: 180 TABLET | Refills: 2 | Status: ON HOLD | OUTPATIENT
Start: 2023-01-26 | End: 2023-02-07 | Stop reason: SDUPTHER

## 2023-01-28 ENCOUNTER — APPOINTMENT (OUTPATIENT)
Dept: RADIOLOGY | Facility: CLINIC | Age: 78
End: 2023-01-28

## 2023-01-28 ENCOUNTER — OFFICE VISIT (OUTPATIENT)
Dept: OBGYN CLINIC | Facility: CLINIC | Age: 78
End: 2023-01-28

## 2023-01-28 VITALS
HEIGHT: 71 IN | BODY MASS INDEX: 23.66 KG/M2 | HEART RATE: 76 BPM | WEIGHT: 169 LBS | DIASTOLIC BLOOD PRESSURE: 73 MMHG | SYSTOLIC BLOOD PRESSURE: 128 MMHG

## 2023-01-28 DIAGNOSIS — C90.00 MULTIPLE MYELOMA, REMISSION STATUS UNSPECIFIED (HCC): ICD-10-CM

## 2023-01-28 DIAGNOSIS — M54.2 NECK PAIN: Primary | ICD-10-CM

## 2023-01-28 DIAGNOSIS — M54.2 NECK PAIN: ICD-10-CM

## 2023-01-28 DIAGNOSIS — Z85.79 HISTORY OF LYMPHOMA: ICD-10-CM

## 2023-01-28 NOTE — PROGRESS NOTES
1  Neck pain  XR spine cervical 2 or 3 vw injury    MRI cervical spine wo contrast    Ambulatory referral to Pain Management      2  Multiple myeloma, remission status unspecified ([facility] )  MRI cervical spine wo contrast      3  History of lymphoma  MRI cervical spine wo contrast        Orders Placed This Encounter   Procedures   • XR spine cervical 2 or 3 vw injury   • MRI cervical spine wo contrast   • Ambulatory referral to Pain Management        IMAGING STUDIES: (I personally reviewed images in PACS and report):   X-ray cervical vertebra 1/28/2023  Severe degenerative disc disease C5-6 C6-7      PAST REPORTS:        ASSESSMENT/PLAN:  Neck Pain  History of lymphoma  Currently on treatment for multiple myeloma      Repeat X-ray next visit: None    No follow-ups on file  Patient Instructions   MRI of the neck due to history of lymphoma and current treatment for multiple myeloma  Start home exercise program  Have MRI performed  Follow-up with pain management to consider more invasive intervention        __________________________________________________________________________    HISTORY OF PRESENT ILLNESS:  Complains of neck pain ongoing for approximately 2 months  Denies any specific injury event  Points to upper trapezius right side of the neck paraspinal muscles a source of pain  Traces radicular pain along his right forearm into his small finger and complains of numbness and tingling into the small finger  Pain is constant soreness mild but at times can be moderate  Did have similar pain 2 years ago    No recent physical therapy     + Has medical history sent for lymphoma      Review of Systems      Following history reviewed and update:    Past Medical History:   Diagnosis Date   • Acute respiratory failure with hypoxia ([facility] ) 4/20/2022   • Cataract    • Colitis    • Colon polyp    • Fatty liver    • GERD (gastroesophageal reflux disease)    • History of chemotherapy    • History of radiation therapy    • History of shingles    • History of transfusion    • Hyperlipidemia    • 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[address] )      Past Surgical History:   Procedure Laterality Date   • AORTIC VALVE REPLACEMENT     • CARDIAC VALVE REPLACEMENT      aorta   • CATARACT EXTRACTION Bilateral    • COLECTOMY     • COLONOSCOPY  2019    Prior right hemicolectomy   • CORONARY ARTERY BYPASS GRAFT     • DENTAL SURGERY     • JOINT REPLACEMENT  2019    left knee   • KNEE SURGERY     • LYMPH NODE BIOPSY     • LYMPHADENECTOMY     • OTHER SURGICAL HISTORY      MAZE PROCEDURE   • [place] ARTHRP KNE CONDYLE&PLATU MEDIAL&LAT COMPARTMENTS Left 2019    Procedure: ARTHROPLASTY LEFT KNEE TOTAL;  Surgeon: Guillermina Gitelman, MD;  Location:  MAIN OR;  Service: Orthopedics   • [place] LARYNGOSCOPY DIRECT OPERATIVE W/BIOPSY Right 2022    Procedure: DIRECT LARYNGOSCOPY WITH BIOPSY RIGHT PHARYNX, POSSIBLE RIGHT NECK LYMPH NODE BIOPSY; FROZEN SECTION;  Surgeon: Juan C Perez MD;  Location:  MAIN OR;  Service: ENT   • SKIN BIOPSY     • UPPER GASTROINTESTINAL ENDOSCOPY  2019    Schatzki ring   • US GUIDED LYMPH NODE BIOPSY RIGHT  2021     Social History   Social History     Substance and Sexual Activity   Alcohol Use Yes   • Alcohol/week: 2 0 standard drinks   • Types: 2 Cans of beer per week    Comment: very rare "beer here and there"     Social History     Substance and Sexual Activity   Drug Use No     Social History     Tobacco Use   Smoking Status Former   • Packs/day: 1 00   • Years: 40 00   • Pack years: 40 00   • Types: Cigarettes   • Start date: 47   • Quit date: 2009   • Years since quittin 0   Smokeless Tobacco Never     Family History   Problem Relation Age of Onset   • Heart block Family    • Coronary artery disease Mother    • Thrombosis Mother    • Hypertension Mother    • Arthritis Mother    • Hyperlipidemia Mother    • Diabetes Father    • Hypertension Father    • Arthritis Father    • Sudden death Father cardiac   • Colon cancer Paternal Grandfather    • Cancer Paternal Grandfather    • Breast cancer Sister    • Heart disease Brother         Coronary stents     Allergies   Allergen Reactions   • Ceftin [Cefuroxime] Itching     However, has tolerated Cefazolin and Pip-Tazo since, which have different side chains  Be cautious with / avoid 2nd, 3rd, or 4th Gen Cephs that have similar side chains to Cefuroxime  • Lantus [Insulin Glargine] Itching          Physical Exam  /73 (BP Location: Left arm, Patient Position: Sitting, Cuff Size: Adult)   Pulse 76   Ht 5' 11" (1 803 m)   Wt 76 7 kg (169 lb)   BMI 23 57 kg/m²     Constitutional:  see vital signs  Gen: well-developed, normocephalic/atraumatic, well-groomed  Eyes: No inflammation or discharge of conjunctiva or lids; sclera clear   Pharynx: no inflammation, lesion, or mass of lips  Neck: supple, no masses, non-distended  MSK: no inflammation, lesion, mass, or clubbing of nails and digits except for other than mentioned below  SKIN: no visible rashes or skin lesions  Pulmonary/Chest: Effort normal  No respiratory distress     NEURO: cranial nerves grossly intact  PSYCH:  Alert and oriented to person, place, and time; recent and remote memory intact; mood normal, no depression, anxiety, or agitation, judgment and insight good and intact     Ortho Exam  Cervical  Midline spinous process tenderness: C7-T1  Muscular Tenderness: None  Sensation UE Bilateral:  C5: normal  C6: normal  C7: normal  C8: normal  T1: normal  Strength UE: 5/5 elbow, wrist, fingers bilateral     __________________________________________________________________________  Procedures

## 2023-01-28 NOTE — PATIENT INSTRUCTIONS
MRI of the neck due to history of lymphoma and current treatment for multiple myeloma  Start home exercise program  Have MRI performed  Follow-up with pain management to consider more invasive intervention

## 2023-02-02 ENCOUNTER — TELEPHONE (OUTPATIENT)
Dept: OBGYN CLINIC | Facility: HOSPITAL | Age: 78
End: 2023-02-02

## 2023-02-02 LAB
ALBUMIN SERPL-MCNC: 4 G/DL (ref 3.7–4.7)
ALBUMIN/CREAT UR: 23 MG/G CREAT (ref 0–29)
ALBUMIN/GLOB SERPL: 1.7 {RATIO} (ref 1.2–2.2)
ALP SERPL-CCNC: 116 IU/L (ref 44–121)
ALT SERPL-CCNC: 9 IU/L (ref 0–44)
AST SERPL-CCNC: 15 IU/L (ref 0–40)
BASOPHILS # BLD AUTO: 0.1 X10E3/UL (ref 0–0.2)
BASOPHILS NFR BLD AUTO: 1 %
BILIRUB SERPL-MCNC: 0.4 MG/DL (ref 0–1.2)
BUN SERPL-MCNC: 13 MG/DL (ref 8–27)
BUN/CREAT SERPL: 14 (ref 10–24)
CALCIUM SERPL-MCNC: 9.2 MG/DL (ref 8.6–10.2)
CHLORIDE SERPL-SCNC: 101 MMOL/L (ref 96–106)
CHOLEST SERPL-MCNC: 113 MG/DL (ref 100–199)
CHOLEST/HDLC SERPL: 3.2 RATIO (ref 0–5)
CO2 SERPL-SCNC: 29 MMOL/L (ref 20–29)
CREAT SERPL-MCNC: 0.93 MG/DL (ref 0.76–1.27)
CREAT UR-MCNC: 59.1 MG/DL
EGFR: 85 ML/MIN/1.73
EOSINOPHIL # BLD AUTO: 0.2 X10E3/UL (ref 0–0.4)
EOSINOPHIL NFR BLD AUTO: 2 %
ERYTHROCYTE [DISTWIDTH] IN BLOOD BY AUTOMATED COUNT: 14.5 % (ref 11.6–15.4)
EST. AVERAGE GLUCOSE BLD GHB EST-MCNC: 209 MG/DL
GLOBULIN SER-MCNC: 2.3 G/DL (ref 1.5–4.5)
GLUCOSE SERPL-MCNC: 151 MG/DL (ref 70–99)
HBA1C MFR BLD: 8.9 % (ref 4.8–5.6)
HCT VFR BLD AUTO: 38.2 % (ref 37.5–51)
HDLC SERPL-MCNC: 35 MG/DL
HGB BLD-MCNC: 13.1 G/DL (ref 13–17.7)
IMM GRANULOCYTES # BLD: 0.1 X10E3/UL (ref 0–0.1)
IMM GRANULOCYTES NFR BLD: 1 %
LDLC SERPL CALC-MCNC: 58 MG/DL (ref 0–99)
LYMPHOCYTES # BLD AUTO: 1.7 X10E3/UL (ref 0.7–3.1)
LYMPHOCYTES NFR BLD AUTO: 20 %
MCH RBC QN AUTO: 29.8 PG (ref 26.6–33)
MCHC RBC AUTO-ENTMCNC: 34.3 G/DL (ref 31.5–35.7)
MCV RBC AUTO: 87 FL (ref 79–97)
MICROALBUMIN UR-MCNC: 13.7 UG/ML
MONOCYTES # BLD AUTO: 0.7 X10E3/UL (ref 0.1–0.9)
MONOCYTES NFR BLD AUTO: 9 %
NEUTROPHILS # BLD AUTO: 5.7 X10E3/UL (ref 1.4–7)
NEUTROPHILS NFR BLD AUTO: 67 %
PLATELET # BLD AUTO: 236 X10E3/UL (ref 150–450)
POTASSIUM SERPL-SCNC: 3.9 MMOL/L (ref 3.5–5.2)
PROT SERPL-MCNC: 6.3 G/DL (ref 6–8.5)
RBC # BLD AUTO: 4.4 X10E6/UL (ref 4.14–5.8)
SL AMB VLDL CHOLESTEROL CALC: 20 MG/DL (ref 5–40)
SODIUM SERPL-SCNC: 143 MMOL/L (ref 134–144)
TRIGL SERPL-MCNC: 110 MG/DL (ref 0–149)
WBC # BLD AUTO: 8.5 X10E3/UL (ref 3.4–10.8)

## 2023-02-02 NOTE — TELEPHONE ENCOUNTER
Caller: patient    Doctor: n/a    Reason for call: patient called in to confirm MRI appt    Call back#: n/a

## 2023-02-04 ENCOUNTER — APPOINTMENT (OUTPATIENT)
Dept: RADIOLOGY | Facility: HOSPITAL | Age: 78
End: 2023-02-04

## 2023-02-04 ENCOUNTER — HOSPITAL ENCOUNTER (INPATIENT)
Facility: HOSPITAL | Age: 78
LOS: 3 days | Discharge: HOME | End: 2023-02-07
Attending: EMERGENCY MEDICINE | Admitting: HOSPITALIST

## 2023-02-04 ENCOUNTER — OFFICE VISIT (OUTPATIENT)
Dept: URGENT CARE | Facility: CLINIC | Age: 78
End: 2023-02-04

## 2023-02-04 VITALS
SYSTOLIC BLOOD PRESSURE: 142 MMHG | RESPIRATION RATE: 20 BRPM | HEART RATE: 95 BPM | DIASTOLIC BLOOD PRESSURE: 63 MMHG | OXYGEN SATURATION: 96 % | TEMPERATURE: 101.5 F

## 2023-02-04 DIAGNOSIS — Z20.822 ENCOUNTER FOR SCREENING LABORATORY TESTING FOR COVID-19 VIRUS: Primary | ICD-10-CM

## 2023-02-04 DIAGNOSIS — B99.9 FEVER DUE TO INFECTION: ICD-10-CM

## 2023-02-04 DIAGNOSIS — D72.829 LEUKOCYTOSIS: ICD-10-CM

## 2023-02-04 DIAGNOSIS — R50.9 FEVER: ICD-10-CM

## 2023-02-04 DIAGNOSIS — R05.9 COUGH: ICD-10-CM

## 2023-02-04 DIAGNOSIS — E11.9 TYPE 2 DIABETES MELLITUS WITHOUT COMPLICATION, WITHOUT LONG-TERM CURRENT USE OF INSULIN (HCC): ICD-10-CM

## 2023-02-04 DIAGNOSIS — D84.821 IMMUNOSUPPRESSION DUE TO DRUG THERAPY (HCC): ICD-10-CM

## 2023-02-04 DIAGNOSIS — R68.89 FLU-LIKE SYMPTOMS: Primary | ICD-10-CM

## 2023-02-04 DIAGNOSIS — E87.6 HYPOKALEMIA: ICD-10-CM

## 2023-02-04 DIAGNOSIS — Z79.899 ON ANTINEOPLASTIC CHEMOTHERAPY: ICD-10-CM

## 2023-02-04 DIAGNOSIS — Z79.899 IMMUNOSUPPRESSION DUE TO DRUG THERAPY (HCC): ICD-10-CM

## 2023-02-04 PROBLEM — A41.9 SEPSIS (HCC): Status: ACTIVE | Noted: 2023-02-04

## 2023-02-04 LAB
ANION GAP SERPL CALCULATED.3IONS-SCNC: 5 MMOL/L (ref 4–13)
BASOPHILS # BLD AUTO: 0.07 THOUSANDS/ÂΜL (ref 0–0.1)
BASOPHILS NFR BLD AUTO: 1 % (ref 0–1)
BILIRUB UR QL STRIP: NEGATIVE
BUN SERPL-MCNC: 12 MG/DL (ref 5–25)
CALCIUM SERPL-MCNC: 9.2 MG/DL (ref 8.3–10.1)
CHLORIDE SERPL-SCNC: 99 MMOL/L (ref 96–108)
CLARITY UR: CLEAR
CO2 SERPL-SCNC: 33 MMOL/L (ref 21–32)
COLOR UR: YELLOW
CREAT SERPL-MCNC: 1.08 MG/DL (ref 0.6–1.3)
EOSINOPHIL # BLD AUTO: 0.03 THOUSAND/ÂΜL (ref 0–0.61)
EOSINOPHIL NFR BLD AUTO: 0 % (ref 0–6)
ERYTHROCYTE [DISTWIDTH] IN BLOOD BY AUTOMATED COUNT: 15.4 % (ref 11.6–15.1)
FLUAV RNA RESP QL NAA+PROBE: NEGATIVE
FLUBV RNA RESP QL NAA+PROBE: NEGATIVE
GFR SERPL CREATININE-BSD FRML MDRD: 66 ML/MIN/1.73SQ M
GLUCOSE SERPL-MCNC: 193 MG/DL (ref 65–140)
GLUCOSE UR STRIP-MCNC: ABNORMAL MG/DL
HCT VFR BLD AUTO: 36.5 % (ref 36.5–49.3)
HGB BLD-MCNC: 11.9 G/DL (ref 12–17)
HGB UR QL STRIP.AUTO: NEGATIVE
IMM GRANULOCYTES # BLD AUTO: 0.07 THOUSAND/UL (ref 0–0.2)
IMM GRANULOCYTES NFR BLD AUTO: 1 % (ref 0–2)
KETONES UR STRIP-MCNC: NEGATIVE MG/DL
LACTATE SERPL-SCNC: 1.2 MMOL/L (ref 0.5–2)
LEUKOCYTE ESTERASE UR QL STRIP: NEGATIVE
LYMPHOCYTES # BLD AUTO: 1.52 THOUSANDS/ÂΜL (ref 0.6–4.47)
LYMPHOCYTES NFR BLD AUTO: 12 % (ref 14–44)
MCH RBC QN AUTO: 29.6 PG (ref 26.8–34.3)
MCHC RBC AUTO-ENTMCNC: 32.6 G/DL (ref 31.4–37.4)
MCV RBC AUTO: 91 FL (ref 82–98)
MONOCYTES # BLD AUTO: 1.27 THOUSAND/ÂΜL (ref 0.17–1.22)
MONOCYTES NFR BLD AUTO: 10 % (ref 4–12)
NEUTROPHILS # BLD AUTO: 9.88 THOUSANDS/ÂΜL (ref 1.85–7.62)
NEUTS SEG NFR BLD AUTO: 76 % (ref 43–75)
NITRITE UR QL STRIP: NEGATIVE
NRBC BLD AUTO-RTO: 0 /100 WBCS
PH UR STRIP.AUTO: 6 [PH]
PLATELET # BLD AUTO: 178 THOUSANDS/UL (ref 149–390)
PMV BLD AUTO: 9.7 FL (ref 8.9–12.7)
POTASSIUM SERPL-SCNC: 3 MMOL/L (ref 3.5–5.3)
PROCALCITONIN SERPL-MCNC: 0.17 NG/ML
PROT UR STRIP-MCNC: ABNORMAL MG/DL
RBC # BLD AUTO: 4.02 MILLION/UL (ref 3.88–5.62)
RSV RNA RESP QL NAA+PROBE: NEGATIVE
SARS-COV-2 RNA RESP QL NAA+PROBE: NEGATIVE
SODIUM SERPL-SCNC: 137 MMOL/L (ref 135–147)
SP GR UR STRIP.AUTO: 1.01 (ref 1–1.03)
TSH SERPL DL<=0.05 MIU/L-ACNC: 1.43 UIU/ML (ref 0.45–4.5)
UROBILINOGEN UR STRIP-ACNC: <2 MG/DL
WBC # BLD AUTO: 12.84 THOUSAND/UL (ref 4.31–10.16)

## 2023-02-04 RX ORDER — METOPROLOL SUCCINATE 50 MG/1
100 TABLET, EXTENDED RELEASE ORAL DAILY
Status: DISCONTINUED | OUTPATIENT
Start: 2023-02-05 | End: 2023-02-07 | Stop reason: HOSPADM

## 2023-02-04 RX ORDER — ENOXAPARIN SODIUM 100 MG/ML
40 INJECTION SUBCUTANEOUS DAILY
Status: DISCONTINUED | OUTPATIENT
Start: 2023-02-05 | End: 2023-02-07 | Stop reason: HOSPADM

## 2023-02-04 RX ORDER — FUROSEMIDE 20 MG/1
20 TABLET ORAL 2 TIMES DAILY
Status: DISCONTINUED | OUTPATIENT
Start: 2023-02-04 | End: 2023-02-05

## 2023-02-04 RX ORDER — CEFEPIME HYDROCHLORIDE 2 G/50ML
2000 INJECTION, SOLUTION INTRAVENOUS EVERY 12 HOURS
Status: DISCONTINUED | OUTPATIENT
Start: 2023-02-05 | End: 2023-02-07

## 2023-02-04 RX ORDER — ONDANSETRON 2 MG/ML
4 INJECTION INTRAMUSCULAR; INTRAVENOUS EVERY 6 HOURS PRN
Status: DISCONTINUED | OUTPATIENT
Start: 2023-02-04 | End: 2023-02-04

## 2023-02-04 RX ORDER — CEFEPIME HYDROCHLORIDE 2 G/50ML
2000 INJECTION, SOLUTION INTRAVENOUS ONCE
Status: COMPLETED | OUTPATIENT
Start: 2023-02-04 | End: 2023-02-04

## 2023-02-04 RX ORDER — FERROUS SULFATE 325(65) MG
325 TABLET ORAL
Status: DISCONTINUED | OUTPATIENT
Start: 2023-02-05 | End: 2023-02-07 | Stop reason: HOSPADM

## 2023-02-04 RX ORDER — MELATONIN
2000 DAILY
Status: DISCONTINUED | OUTPATIENT
Start: 2023-02-05 | End: 2023-02-07 | Stop reason: HOSPADM

## 2023-02-04 RX ORDER — ASPIRIN 81 MG/1
81 TABLET ORAL DAILY
Status: DISCONTINUED | OUTPATIENT
Start: 2023-02-05 | End: 2023-02-07 | Stop reason: HOSPADM

## 2023-02-04 RX ORDER — PANTOPRAZOLE SODIUM 40 MG/1
40 TABLET, DELAYED RELEASE ORAL
Status: DISCONTINUED | OUTPATIENT
Start: 2023-02-05 | End: 2023-02-07 | Stop reason: HOSPADM

## 2023-02-04 RX ORDER — POTASSIUM CHLORIDE 14.9 MG/ML
20 INJECTION INTRAVENOUS ONCE
Status: COMPLETED | OUTPATIENT
Start: 2023-02-04 | End: 2023-02-04

## 2023-02-04 RX ORDER — ATORVASTATIN CALCIUM 40 MG/1
40 TABLET, FILM COATED ORAL DAILY
Status: DISCONTINUED | OUTPATIENT
Start: 2023-02-05 | End: 2023-02-07 | Stop reason: HOSPADM

## 2023-02-04 RX ORDER — MAGNESIUM HYDROXIDE/ALUMINUM HYDROXICE/SIMETHICONE 120; 1200; 1200 MG/30ML; MG/30ML; MG/30ML
30 SUSPENSION ORAL EVERY 6 HOURS PRN
Status: DISCONTINUED | OUTPATIENT
Start: 2023-02-04 | End: 2023-02-07 | Stop reason: HOSPADM

## 2023-02-04 RX ORDER — VALACYCLOVIR HYDROCHLORIDE 500 MG/1
500 TABLET, FILM COATED ORAL DAILY
Status: DISCONTINUED | OUTPATIENT
Start: 2023-02-05 | End: 2023-02-07 | Stop reason: HOSPADM

## 2023-02-04 RX ORDER — POTASSIUM CHLORIDE 20 MEQ/1
20 TABLET, EXTENDED RELEASE ORAL ONCE
Status: COMPLETED | OUTPATIENT
Start: 2023-02-04 | End: 2023-02-04

## 2023-02-04 RX ORDER — DEXAMETHASONE SODIUM PHOSPHATE 10 MG/ML
20 INJECTION, SOLUTION INTRAMUSCULAR; INTRAVENOUS SEE ADMIN INSTRUCTIONS
COMMUNITY

## 2023-02-04 RX ADMIN — POTASSIUM CHLORIDE 20 MEQ: 1500 TABLET, EXTENDED RELEASE ORAL at 15:39

## 2023-02-04 RX ADMIN — POTASSIUM CHLORIDE 20 MEQ: 14.9 INJECTION, SOLUTION INTRAVENOUS at 15:42

## 2023-02-04 RX ADMIN — FUROSEMIDE 20 MG: 20 TABLET ORAL at 20:42

## 2023-02-04 RX ADMIN — VANCOMYCIN HYDROCHLORIDE 1750 MG: 1 INJECTION, POWDER, LYOPHILIZED, FOR SOLUTION INTRAVENOUS at 16:56

## 2023-02-04 RX ADMIN — CEFEPIME HYDROCHLORIDE 2000 MG: 2 INJECTION, SOLUTION INTRAVENOUS at 16:32

## 2023-02-04 NOTE — ASSESSMENT & PLAN NOTE
Lab Results   Component Value Date    HGBA1C 8 9 (H) 02/01/2023       No results for input(s): POCGLU in the last 72 hours  Blood Sugar Average: Last 72 hrs:  Home regimen: Metformin 1000 mg twice daily  Sugars continue to be elevated, secondary to dexamethasone use every Thursday and with immunomodulating therapy  His oncologist wants him to see an endocrinologist  Endocrinology consult  Appreciate recs    Sliding scale insulin  Diabetic diet

## 2023-02-04 NOTE — ED PROVIDER NOTES
History  Chief Complaint   Patient presents with   • Flu Symptoms     Pt reports a cough that started about a week and a half ago, and then today took his temp and had a low grade  Has had pneumonia in the past and is a cancer patient so he wanted to make sure it wasn't happening again      Has Hx HTN/HLD Hx Lymphoma, DM2, GERD chronic Multiple Myeloma with ongoing chemotherapy @ [facility] with recent CMT treatment this past Thursday  He states that he has been coughing over the past two weeks approx but today had fevers of 101 @ home and 101 5 at Urgent Care and was sent to ED  He denies any other symptoms  He is on Dartumumab and Bortezomib as well as Dexamethasone  Prior to Admission Medications   Prescriptions Last Dose Informant Patient Reported? Taking?    Daratumumab-Hyaluronidase-fihj (DARZALEX FASPRO SC) 2023  Yes Yes   Sig: Inject 1,800 mg under the skin Twice a month   IMMUNE GLOBULIN, HUMAN, IV 2023  Yes Yes   Sig: Inject 30,000 mg into a catheter in a vein   aspirin 81 MG tablet   Yes No   Sig: Take 1 tablet (81 mg total) by mouth daily   atorvastatin (LIPITOR) 40 mg tablet   No No   Sig: TAKE 1 TABLET BY MOUTH EVERY DAY   bortezomib (VELCADE) 2023  Yes Yes   Sig: Inject 2 5 mg under the skin see administration instructions Every 2 months   cholecalciferol (VITAMIN D3) 1,000 units tablet   No No   Sig: Take 2 tablets (2,000 Units total) by mouth daily   dexamethasone, PF, (DECADRON) 10 mg/mL 2023  Yes Yes   Si mg see administration instructions Twice a month   ferrous sulfate 324 (65 Fe) mg   No No   Sig: Take 1 tablet (324 mg total) by mouth daily with breakfast   furosemide (LASIX) 20 mg tablet   No No   Sig: TAKE 1 TABLET BY MOUTH TWICE A DAY   metFORMIN (GLUCOPHAGE-XR) 500 mg 24 hr tablet   No No   Sig: Take 1 tablet (500 mg total) by mouth 2 (two) times a day with meals   metoprolol succinate (TOPROL-XL) 100 mg 24 hr tablet   No No   Sig: TAKE 1 TABLET BY MOUTH EVERY DAY   montelukast (SINGULAIR) 10 mg tablet   Yes No   Sig: Take 10 mg by mouth daily   multivitamin-minerals (CENTRUM ADULTS) tablet   No No   Sig: Take 1 tablet by mouth daily   nitroglycerin (NITROSTAT) 0 3 mg SL tablet   Yes No   Sig: Nitrostat 0 3 mg sublingual tablet   Place 1 tablet as needed by sublingual route as needed for 90 days  omeprazole (PriLOSEC) 40 MG capsule   No No   Sig: TAKE 1 CAPSULE (40 MG TOTAL) BY MOUTH DAILY     valACYclovir (VALTREX) 500 mg tablet   Yes No   Sig: Take 500 mg by mouth daily      Facility-Administered Medications: None       Past Medical History:   Diagnosis Date   • Acute [facility] with [facility] ) 4/20/2022   • Cataract    • Colitis    • Colon polyp    • Fatty liver    • GERD (gastroesophageal reflux disease)    • History of chemotherapy    • History of radiation therapy    • History of shingles 2018   • History of transfusion    • Hyperlipidemia    • 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[facility] )        Past Surgical History:   Procedure Laterality Date   • AORTIC VALVE REPLACEMENT     • CARDIAC VALVE REPLACEMENT  2014    aorta   • CATARACT EXTRACTION Bilateral    • COLECTOMY     • COLONOSCOPY  11/2019    Prior right hemicolectomy   • CORONARY ARTERY BYPASS GRAFT     • DENTAL SURGERY     • JOINT REPLACEMENT  January 2019    left knee   • KNEE SURGERY     • LYMPH NODE BIOPSY  2003   • LYMPHADENECTOMY     • OTHER SURGICAL HISTORY      MAZE PROCEDURE   • MI ARTHRP KNE CONDYLE&PLATU MEDIAL&LAT COMPARTMENTS Left 01/28/2019    Procedure: ARTHROPLASTY LEFT KNEE TOTAL;  Surgeon: Richard Tolentino MD;  Location: [facility] OR;  Service: Orthopedics   • MI LARYNGOSCOPY DIRECT OPERATIVE W/BIOPSY Right 04/27/2022    Procedure: DIRECT LARYNGOSCOPY WITH BIOPSY RIGHT PHARYNX, POSSIBLE RIGHT NECK LYMPH NODE BIOPSY; FROZEN SECTION;  Surgeon: Andrea Topete MD;  Location:  MAIN OR;  Service: ENT   • SKIN BIOPSY     • UPPER GASTROINTESTINAL ENDOSCOPY  11/2019    Adrienneki ring   • US GUIDED LYMPH NODE BIOPSY RIGHT  2021       Family History   Problem Relation Age of Onset   • Heart block Family    • Coronary artery disease Mother    • Thrombosis Mother    • Hypertension Mother    • Arthritis Mother    • Hyperlipidemia Mother    • Diabetes Father    • Hypertension Father    • Arthritis Father    • Sudden death Father         cardiac   • Colon cancer Paternal Grandfather    • Cancer Paternal Grandfather    • Breast cancer Sister    • Heart disease Brother         Coronary stents     I have reviewed and agree with the history as documented  E-Cigarette/Vaping   • E-Cigarette Use Never User      E-Cigarette/Vaping Substances   • Nicotine No    • THC No    • CBD No    • Flavoring No    • Other No    • Unknown No      Social History     Tobacco Use   • Smoking status: Former     Packs/day: 1 00     Years: 40 00     Pack years: 40 00     Types: Cigarettes     Start date: 47     Quit date: 2009     Years since quittin 1   • Smokeless tobacco: Never   Vaping Use   • Vaping Use: Never used   Substance Use Topics   • Alcohol use: Yes     Alcohol/week: 2 0 standard drinks     Types: 2 Cans of beer per week     Comment: very rare "beer here and there"   • Drug use: No       Review of Systems   Constitutional: Positive for chills and fever  HENT: Negative for ear pain and sore throat  Eyes: Negative for pain and visual disturbance  Respiratory: Positive for cough  Negative for shortness of breath  Cardiovascular: Negative for chest pain and palpitations  Gastrointestinal: Negative for abdominal pain and vomiting  Musculoskeletal: Negative for myalgias  Skin: Negative for color change and rash  Neurological: Negative for headaches  All other systems reviewed and are negative  Physical Exam  Physical Exam  Vitals and nursing note reviewed  Constitutional:       General: He is not in acute distress  Appearance: He is well-developed     HENT:      Head: Normocephalic and atraumatic  Eyes:      Conjunctiva/sclera: Conjunctivae normal    Cardiovascular:      Rate and Rhythm: Normal rate and regular rhythm  Heart sounds: No murmur heard  Pulmonary:      Effort: Pulmonary effort is normal  No respiratory distress  Breath sounds: Examination of the left-lower field reveals rales  Rales present  Comments: 95% on RA   Abdominal:      Palpations: Abdomen is soft  Tenderness: There is no abdominal tenderness  Musculoskeletal:         General: No swelling  Cervical back: Neck supple  Skin:     General: Skin is warm and dry  Capillary Refill: Capillary refill takes less than 2 seconds  Neurological:      Mental Status: He is alert  Psychiatric:         Mood and Affect: Mood normal          Vital Signs  ED Triage Vitals   Temperature Pulse Respirations Blood Pressure SpO2   02/04/23 1259 02/04/23 1259 02/04/23 1259 02/04/23 1259 02/04/23 1259   99 1 °F (37 3 °C) 91 18 132/61 96 %      Temp Source Heart Rate Source Patient Position - Orthostatic VS BP Location FiO2 (%)   02/04/23 1259 02/04/23 1259 -- -- --   Oral Monitor         Pain Score       02/04/23 1500       No Pain           Vitals:    02/04/23 1259 02/04/23 1415 02/04/23 1500   BP: 132/61 132/60 137/68   Pulse: 91 85 84         Visual Acuity      ED Medications  Medications   potassium chloride 20 mEq IVPB (premix) (20 mEq Intravenous New Bag 2/4/23 1542)   cefepime (MAXIPIME) IVPB (premix in dextrose) 2,000 mg 50 mL (has no administration in time range)   vancomycin (VANCOCIN) 1,750 mg in sodium chloride 0 9 % 500 mL IVPB (has no administration in time range)   potassium chloride (K-DUR,KLOR-CON) CR tablet 20 mEq (20 mEq Oral Given 2/4/23 1539)       Diagnostic Studies  Results Reviewed     Procedure Component Value Units Date/Time    Urine culture [[id number]]     Lab Status: No result Specimen: Urine     Respiratory Panel 2  1(RP2)with COVID19 [[id number]]     Lab Status: No result Specimen: Nasopharyngeal Swab     Procalcitonin [[id number]]  (Normal) Collected: 02/04/23 1450    Lab Status: Final result Specimen: Blood from Arm, Right Updated: 02/04/23 1522     Procalcitonin 0 17 ng/ml     Lactic acid, plasma [[id number]]  (Normal) Collected: 02/04/23 1450    Lab Status: Final result Specimen: Blood from Arm, Right Updated: 02/04/23 1522     LACTIC ACID 1 2 mmol/L     Narrative:      Result may be elevated if tourniquet was used during collection  Basic metabolic panel [[id number]]  (Abnormal) Collected: 02/04/23 1450    Lab Status: Final result Specimen: Blood from Arm, Right Updated: 02/04/23 1512     Sodium 137 mmol/L      Potassium 3 0 mmol/L      Chloride 99 mmol/L      CO2 33 mmol/L      ANION GAP 5 mmol/L      BUN 12 mg/dL      Creatinine 1 08 mg/dL      Glucose 193 mg/dL      Calcium 9 2 mg/dL      eGFR 66 ml/min/1 73sq m     Narrative:      [place] guidelines for Chronic Kidney Disease (CKD):   •  Stage 1 with normal or high GFR (GFR > 90 mL/min/1 73 square meters)  •  Stage 2 Mild CKD (GFR = 60-89 mL/min/1 73 square meters)  •  Stage 3A Moderate CKD (GFR = 45-59 mL/min/1 73 square meters)  •  Stage 3B Moderate CKD (GFR = 30-44 mL/min/1 73 square meters)  •  Stage 4 Severe CKD (GFR = 15-29 mL/min/1 73 square meters)  •  Stage 5 End Stage CKD (GFR <15 mL/min/1 73 square meters)  Note: GFR calculation is accurate only with a steady state creatinine    Blood culture #2 [[id number]] Collected: 02/04/23 1501    Lab Status:  In process Specimen: Blood from Arm, Left Updated: 02/04/23 1510    CBC and differential [[id number]]  (Abnormal) Collected: 02/04/23 1450    Lab Status: Final result Specimen: Blood from Arm, Right Updated: 02/04/23 1457     WBC 12 84 Thousand/uL      RBC 4 02 Million/uL      Hemoglobin 11 9 g/dL      Hematocrit 36 5 %      MCV 91 fL      MCH 29 6 pg      MCHC 32 6 g/dL      RDW 15 4 %      MPV 9 7 fL      Platelets 826 Thousands/uL      nRBC 0 /100 WBCs Neutrophils Relative 76 %      Immat GRANS % 1 %      Lymphocytes Relative 12 %      Monocytes Relative 10 %      Eosinophils Relative 0 %      Basophils Relative 1 %      Neutrophils Absolute 9 88 Thousands/µL      Immature Grans Absolute 0 07 Thousand/uL      Lymphocytes Absolute 1 52 Thousands/µL      Monocytes Absolute 1 27 Thousand/µL      Eosinophils Absolute 0 03 Thousand/µL      Basophils Absolute 0 07 Thousands/µL     Blood culture #1 [[id number]] Collected: 02/04/23 1450    Lab Status: In process Specimen: Blood from Arm, Right Updated: 02/04/23 1455    COVID/FLU/RSV [[id number]]  (Normal) Collected: 02/04/23 1302    Lab Status: Final result Specimen: Nares from Nose Updated: 02/04/23 1351     SARS-CoV-2 Negative     INFLUENZA A PCR Negative     INFLUENZA B PCR Negative     RSV PCR Negative    Narrative:      FOR PEDIATRIC PATIENTS - copy/paste COVID Guidelines URL to browser: [email or web address]    SARS-CoV-2 assay is a Nucleic Acid Amplification assay intended for the  qualitative detection of nucleic acid from SARS-CoV-2 in nasopharyngeal  swabs  Results are for the presumptive identification of SARS-CoV-2 RNA  Positive results are indicative of infection with SARS-CoV-2, the virus  causing COVID-19, but do not rule out bacterial infection or co-infection  with other viruses  Laboratories within the [place] and its  territories are required to report all positive results to the appropriate  public health authorities  Negative results do not preclude SARS-CoV-2  infection and should not be used as the sole basis for treatment or other  patient management decisions  Negative results must be combined with  clinical observations, patient history, and epidemiological information  This test has not been FDA cleared or approved  This test has been authorized by FDA under an Emergency Use Authorization  (EUA)   This test is only authorized for the duration of time the  declaration that circumstances exist justifying the authorization of the  emergency use of an in vitro diagnostic tests for detection of SARS-CoV-2  virus and/or diagnosis of COVID-19 infection under section 564(b)(1) of  the Act, 21 U  S C  351UKU-2(K)(8), unless the authorization is terminated  or revoked sooner  The test has been validated but independent review by FDA  and CLIA is pending  Test performed using [employer] GeneXpert: This RT-PCR assay targets N2,  a region unique to SARS-CoV-2  A conserved region in the E-gene was chosen  for pan-Sarbecovirus detection which includes SARS-CoV-2  According to CMS-2020-01-R, this platform meets the definition of high-throughput technology  XR chest 2 views    (Results Pending)              Procedures  ECG 12 Lead Documentation Only    Date/Time: 2/4/2023 2:13 PM  Performed by: Cris Recio PA-C  Authorized by: Cris Recio PA-C     Indications / Diagnosis:  Cough   ECG reviewed by me, the ED Provider: yes    Patient location:  ED  Previous ECG:     Previous ECG:  Compared to current    Comparison to cardiac monitor: Yes    Interpretation:     Interpretation: normal    Rate:     ECG rate:  86    ECG rate assessment: normal    Rhythm:     Rhythm: sinus rhythm    Ectopy:     Ectopy: PAC    QRS:     QRS axis:  Right  Conduction:     Conduction: normal    ST segments:     ST segments:  Non-specific (persistent st depressions compared to prior EKGs)    Depression:  V5 and V6  T waves:     T waves: normal               ED Course  ED Course as of 02/04/23 1633   Sat Feb 04, 2023   1427 Xrays and EKGs reviewed  /    5771 Cased d/w Dr Jennifer Mayberry who reviewed and agreed upon admission for patient pending r/o [facility]                               SBIRT 20yo+    Flowsheet Row Most Recent Value   SBIRT (25 yo +)    In order to provide better care to our patients, we are screening all of our patients for alcohol and drug use  Would it be okay to ask you these screening questions? No Filed at: 02/04/2023 1424                    Medical Decision Making    Historian:  Patient and wife  [facility] Reviewed[facility]  Mild leukocytosis   Imaging/Diagnostics:  CXR w/ possible LLL infiltrate  Discussion/Special Considerations:  Pt on antineoplastic therapy   Treatment:  IV  ABX with broad spectrum coverage  Dispo: Inpatient IV abx therapy pending negative BCX @ 24-28 hrs      No obvious cause for his fever and he is now afebrile here  I'm not sure when his stephen will be for his WBCs and ANCs to drop but currently WBC 12K  I ordered blood cultures and urine cultures as well as RP2 considering his Flu/COVID/RSV was negative  Plan  to admit him for 14-48 hours pending blood cultures and giving abx until his blood cultures come back negative to cover for occult bacteremia in this immunocompromised patient  DDx Fever in patient on Chemotherapy with possible occult bacteremia, Viral LRI (pending RP2)    Cough: acute illness or injury  Fever: acute illness or injury  Flu-like symptoms: acute illness or injury  Hypokalemia: acute illness or injury  Immunosuppression due to drug therapy [facility]): acute illness or injury  Leukocytosis: self-limited or minor problem  On antineoplastic chemotherapy: acute illness or injury  Amount and/or Complexity of Data Reviewed  Labs: ordered  Radiology: ordered  Risk  Prescription drug management  Decision regarding hospitalization            Disposition  Final diagnoses:   Flu-like symptoms   Fever   Cough   On antineoplastic chemotherapy   Immunosuppression due to drug therapy ([facility] )   Leukocytosis   Hypokalemia     Time reflects when diagnosis was documented in both MDM as applicable and the Disposition within this note     Time User Action Codes Description Comment    2/4/2023  2:43 PM Blaire Herron Add [R65 89] Flu-like symptoms     2/4/2023  2:43 PM Jeremy Shoulders J Add [R50 9] Fever     2/4/2023  2:43 PM Remedios Alvarez Add [R05 9] Cough     2/4/2023  2:44 PM Remedios Alvarez Add [Z79 899] On antineoplastic chemotherapy     2/4/2023  2:45 PM Remedios Alvarez Add [I15 268,  Z79 899] Immunosuppression due to drug therapy ([address] )     2/4/2023  4:11 PM Remedios Alvarez Add [U55 281] Leukocytosis     2/4/2023  4:11 PM Remedios Alvarez Add [E87 6] Hypokalemia       ED Disposition     ED Disposition   Admit    Condition   Stable    Date/Time   Sat Feb 4, 2023  3:49 PM    Comment   Case was discussed with Dr Jennifer Keyes of [address] [place] [address] and the patient's admission status was agreed to be Admission Status: inpatient status to the service of Dr Jennifer Keyes   Follow-up Information     Follow up With Specialties Details Why Contact Info    Nelson Cameron MD Hematology and Oncology, Hematology, Oncology   [address] [place] [address]  [phone]            Patient's Medications   Discharge Prescriptions    No medications on file       No discharge procedures on file      PDMP Review       Value Time User    PDMP Reviewed  Yes 12/9/2022  8:38 AM Florina Ayoub MD          ED Provider  Electronically Signed by           Remedios Parker PA-C  02/04/23 [address], PA-C  02/04/23 4614

## 2023-02-04 NOTE — PROGRESS NOTES
[address] [facility]        NAME: Chloe Weber is a 69 y o  male  : 1945    MRN: [id number]  DATE: 2023  TIME: 12:51 PM    Assessment and Plan   Encounter for screening laboratory testing for COVID-19 virus [Z20 822]  1  Encounter for screening laboratory testing for COVID-19 virus  Cov/Flu-Collected at [facility] or [facility]      2  Fever due to infection              Patient Instructions       Follow up with PCP in 3-5 days  Proceed to  ER if symptoms worsen  Chief Complaint     Chief Complaint   Patient presents with   • Cold Like Symptoms     Pt reports 1 5weeks of cough, chest congestion, post nasal drip, nasal congestion, runny nose, fever (100 5)  History of Present Illness       52-year-old male currently undergoing chemotherapy presenting with cough and congestion for the past 1 to 2 weeks  He has started running a fever at home for the past 2 days of 101  Denies any chest tightness or shortness of breath  Review of Systems   Review of Systems   Constitutional: Positive for fever  HENT: Positive for congestion  Eyes: Negative  Respiratory: Positive for cough  Cardiovascular: Negative  Gastrointestinal: Negative  Genitourinary: Negative  Musculoskeletal: Positive for arthralgias and myalgias  Skin: Negative  Allergic/Immunologic: Negative  Neurological: Negative  Hematological: Negative  Psychiatric/Behavioral: Negative            Current Medications       Current Outpatient Medications:   •  aspirin 81 MG tablet, Take 1 tablet (81 mg total) by mouth daily, Disp: , Rfl:   •  atorvastatin (LIPITOR) 40 mg tablet, TAKE 1 TABLET BY MOUTH EVERY DAY, Disp: 90 tablet, Rfl: 3  •  cholecalciferol (VITAMIN D3) 1,000 units tablet, Take 2 tablets (2,000 Units total) by mouth daily, Disp: 60 tablet, Rfl: 0  •  ferrous sulfate 324 (65 Fe) mg, Take 1 tablet (324 mg total) by mouth daily with breakfast, Disp: 90 tablet, Rfl: 0  •  furosemide (LASIX) 20 mg tablet, TAKE 1 TABLET BY MOUTH TWICE A DAY, Disp: 180 tablet, Rfl: 0  •  metFORMIN (GLUCOPHAGE-XR) 500 mg 24 hr tablet, Take 1 tablet (500 mg total) by mouth 2 (two) times a day with meals, Disp: 180 tablet, Rfl: 2  •  metoprolol succinate (TOPROL-XL) 100 mg 24 hr tablet, TAKE 1 TABLET BY MOUTH EVERY DAY, Disp: 90 tablet, Rfl: 1  •  montelukast (SINGULAIR) 10 mg tablet, Take 10 mg by mouth daily, Disp: , Rfl:   •  multivitamin-minerals (CENTRUM ADULTS) tablet, Take 1 tablet by mouth daily, Disp:  , Rfl: 0  •  nitroglycerin (NITROSTAT) 0 3 mg SL tablet, Nitrostat 0 3 mg sublingual tablet  Place 1 tablet as needed by sublingual route as needed for 90 days  , Disp: , Rfl:   •  omeprazole (PriLOSEC) 40 MG capsule, TAKE 1 CAPSULE (40 MG TOTAL) BY MOUTH DAILY  , Disp: 90 capsule, Rfl: 2  •  valACYclovir (VALTREX) 500 mg tablet, Take 500 mg by mouth daily, Disp: , Rfl:     Current Allergies     Allergies as of 02/04/2023 - Reviewed 02/04/2023   Allergen Reaction Noted   • Ceftin [cefuroxime] Itching 11/23/2018   • Lantus [insulin glargine] Itching 05/10/2021            The following portions of the patient's history were reviewed and updated as appropriate: allergies, current medications, past family history, past medical history, past social history, past surgical history and problem list      Past Medical History:   Diagnosis Date   • Acute respiratory failure with hypoxia ([facility] ) 4/20/2022   • Cataract    • Colitis    • Colon polyp    • Fatty liver    • GERD (gastroesophageal reflux disease)    • History of chemotherapy    • History of radiation therapy    • History of shingles 2018   • History of transfusion    • Hyperlipidemia    • 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[address] )        Past Surgical History:   Procedure Laterality Date   • AORTIC VALVE REPLACEMENT     • CARDIAC VALVE REPLACEMENT  2014    aorta   • CATARACT EXTRACTION Bilateral    • COLECTOMY     • COLONOSCOPY  11/2019    Prior right hemicolectomy   • CORONARY ARTERY BYPASS GRAFT     • DENTAL SURGERY     • JOINT REPLACEMENT  January 2019    left knee   • KNEE SURGERY     • LYMPH NODE BIOPSY  2003   • LYMPHADENECTOMY     • OTHER SURGICAL HISTORY      MAZE PROCEDURE   • ME ARTHRP KNE CONDYLE&PLATU MEDIAL&LAT COMPARTMENTS Left 01/28/2019    Procedure: ARTHROPLASTY LEFT KNEE TOTAL;  Surgeon: Mindy Thomas MD;  Location:  MAIN OR;  Service: Orthopedics   • ME LARYNGOSCOPY DIRECT OPERATIVE W/BIOPSY Right 04/27/2022    Procedure: DIRECT LARYNGOSCOPY WITH BIOPSY RIGHT PHARYNX, POSSIBLE RIGHT NECK LYMPH NODE BIOPSY; FROZEN SECTION;  Surgeon: Amy Paulino MD;  Location:  MAIN OR;  Service: ENT   • SKIN BIOPSY     • UPPER GASTROINTESTINAL ENDOSCOPY  11/2019    Schatzki ring   • US GUIDED LYMPH NODE BIOPSY RIGHT  12/14/2021       Family History   Problem Relation Age of Onset   • Heart block Family    • Coronary artery disease Mother    • Thrombosis Mother    • Hypertension Mother    • Arthritis Mother    • Hyperlipidemia Mother    • Diabetes Father    • Hypertension Father    • Arthritis Father    • Sudden death Father         cardiac   • Colon cancer Paternal Grandfather    • Cancer Paternal Grandfather    • Breast cancer Sister    • Heart disease Brother         Coronary stents         Medications have been verified  Objective   /63   Pulse 95   Temp (!) 101 5 °F (38 6 °C)   Resp 20   SpO2 96% Comment: 91-92  No LMP for male patient  Physical Exam     Physical Exam  Constitutional:       Appearance: He is well-developed  HENT:      Head: Normocephalic  Eyes:      Pupils: Pupils are equal, round, and reactive to light  Pulmonary:      Effort: Pulmonary effort is normal       Breath sounds: Examination of the left-lower field reveals decreased breath sounds  Decreased breath sounds present  Musculoskeletal:         General: Normal range of motion  Cervical back: Normal range of motion  Skin:     General: Skin is warm     Neurological:      Mental Status: He is alert and oriented to person, place, and time

## 2023-02-04 NOTE — ASSESSMENT & PLAN NOTE
Wt Readings from Last 3 Encounters:   02/04/23 73 5 kg (162 lb)   01/28/23 76 7 kg (169 lb)   12/09/22 75 2 kg (165 lb 12 6 oz)     Not in acute exacerbation  EF 26%, grade 2 diastolic dysfunction  Continue Lasix 20 mg twice daily  Patient follow-up with cardiology

## 2023-02-05 PROBLEM — A41.89 SEPSIS DUE TO COVID-19 (HCC): Status: RESOLVED | Noted: 2021-04-15 | Resolved: 2023-02-05

## 2023-02-05 PROBLEM — U07.1 SEPSIS DUE TO COVID-19 (HCC): Status: RESOLVED | Noted: 2021-04-15 | Resolved: 2023-02-05

## 2023-02-05 LAB
ANION GAP SERPL CALCULATED.3IONS-SCNC: 6 MMOL/L (ref 4–13)
ANION GAP SERPL CALCULATED.3IONS-SCNC: 6 MMOL/L (ref 4–13)
B PARAP IS1001 DNA NPH QL NAA+NON-PROBE: NOT DETECTED
B PERT.PT PRMT NPH QL NAA+NON-PROBE: NOT DETECTED
BACTERIA UR QL AUTO: ABNORMAL /HPF
BASOPHILS # BLD AUTO: 0.05 THOUSANDS/ÂΜL (ref 0–0.1)
BASOPHILS NFR BLD AUTO: 1 % (ref 0–1)
BUN SERPL-MCNC: 10 MG/DL (ref 5–25)
BUN SERPL-MCNC: 10 MG/DL (ref 5–25)
C PNEUM DNA NPH QL NAA+NON-PROBE: NOT DETECTED
CALCIUM SERPL-MCNC: 8.3 MG/DL (ref 8.3–10.1)
CALCIUM SERPL-MCNC: 9 MG/DL (ref 8.3–10.1)
CHLORIDE SERPL-SCNC: 102 MMOL/L (ref 96–108)
CHLORIDE SERPL-SCNC: 99 MMOL/L (ref 96–108)
CO2 SERPL-SCNC: 30 MMOL/L (ref 21–32)
CO2 SERPL-SCNC: 31 MMOL/L (ref 21–32)
CREAT SERPL-MCNC: 0.86 MG/DL (ref 0.6–1.3)
CREAT SERPL-MCNC: 0.89 MG/DL (ref 0.6–1.3)
EOSINOPHIL # BLD AUTO: 0.09 THOUSAND/ÂΜL (ref 0–0.61)
EOSINOPHIL NFR BLD AUTO: 1 % (ref 0–6)
ERYTHROCYTE [DISTWIDTH] IN BLOOD BY AUTOMATED COUNT: 15.7 % (ref 11.6–15.1)
FLUAV RNA NPH QL NAA+NON-PROBE: NOT DETECTED
FLUAV RNA RESP QL NAA+PROBE: NEGATIVE
FLUBV RNA NPH QL NAA+NON-PROBE: NOT DETECTED
FLUBV RNA RESP QL NAA+PROBE: NEGATIVE
GFR SERPL CREATININE-BSD FRML MDRD: 83 ML/MIN/1.73SQ M
GFR SERPL CREATININE-BSD FRML MDRD: 84 ML/MIN/1.73SQ M
GLUCOSE SERPL-MCNC: 155 MG/DL (ref 65–140)
GLUCOSE SERPL-MCNC: 174 MG/DL (ref 65–140)
GLUCOSE SERPL-MCNC: 191 MG/DL (ref 65–140)
GLUCOSE SERPL-MCNC: 201 MG/DL (ref 65–140)
GRAN CASTS #/AREA URNS LPF: ABNORMAL /[LPF]
HADV DNA NPH QL NAA+NON-PROBE: NOT DETECTED
HCOV 229E RNA NPH QL NAA+NON-PROBE: NOT DETECTED
HCOV HKU1 RNA NPH QL NAA+NON-PROBE: NOT DETECTED
HCOV NL63 RNA NPH QL NAA+NON-PROBE: NOT DETECTED
HCOV OC43 RNA NPH QL NAA+NON-PROBE: NOT DETECTED
HCT VFR BLD AUTO: 33.3 % (ref 36.5–49.3)
HGB BLD-MCNC: 11.1 G/DL (ref 12–17)
HMPV RNA NPH QL NAA+NON-PROBE: NOT DETECTED
HPIV1 RNA NPH QL NAA+NON-PROBE: NOT DETECTED
HPIV2 RNA NPH QL NAA+NON-PROBE: NOT DETECTED
HPIV3 RNA NPH QL NAA+NON-PROBE: NOT DETECTED
HPIV4 RNA NPH QL NAA+NON-PROBE: NOT DETECTED
HYALINE CASTS #/AREA URNS LPF: ABNORMAL /LPF
IMM GRANULOCYTES # BLD AUTO: 0.05 THOUSAND/UL (ref 0–0.2)
IMM GRANULOCYTES NFR BLD AUTO: 1 % (ref 0–2)
LYMPHOCYTES # BLD AUTO: 1.2 THOUSANDS/ÂΜL (ref 0.6–4.47)
LYMPHOCYTES NFR BLD AUTO: 13 % (ref 14–44)
M PNEUMO DNA NPH QL NAA+NON-PROBE: NOT DETECTED
MCH RBC QN AUTO: 30.1 PG (ref 26.8–34.3)
MCHC RBC AUTO-ENTMCNC: 33.3 G/DL (ref 31.4–37.4)
MCV RBC AUTO: 90 FL (ref 82–98)
MONOCYTES # BLD AUTO: 0.81 THOUSAND/ÂΜL (ref 0.17–1.22)
MONOCYTES NFR BLD AUTO: 9 % (ref 4–12)
NEUTROPHILS # BLD AUTO: 7 THOUSANDS/ÂΜL (ref 1.85–7.62)
NEUTS SEG NFR BLD AUTO: 75 % (ref 43–75)
NON-SQ EPI CELLS URNS QL MICRO: ABNORMAL /HPF
NRBC BLD AUTO-RTO: 0 /100 WBCS
PLATELET # BLD AUTO: 157 THOUSANDS/UL (ref 149–390)
PMV BLD AUTO: 9.6 FL (ref 8.9–12.7)
POTASSIUM SERPL-SCNC: 2.9 MMOL/L (ref 3.5–5.3)
POTASSIUM SERPL-SCNC: 3.5 MMOL/L (ref 3.5–5.3)
PROCALCITONIN SERPL-MCNC: 0.13 NG/ML
RBC # BLD AUTO: 3.69 MILLION/UL (ref 3.88–5.62)
RBC #/AREA URNS AUTO: ABNORMAL /HPF
RSV RNA NPH QL NAA+NON-PROBE: NOT DETECTED
RV+EV RNA NPH QL NAA+NON-PROBE: DETECTED
SARS-COV-2 RNA NPH QL NAA+NON-PROBE: NOT DETECTED
SARS-COV-2 RNA RESP QL NAA+PROBE: NEGATIVE
SODIUM SERPL-SCNC: 135 MMOL/L (ref 135–147)
SODIUM SERPL-SCNC: 139 MMOL/L (ref 135–147)
WBC # BLD AUTO: 9.2 THOUSAND/UL (ref 4.31–10.16)
WBC #/AREA URNS AUTO: ABNORMAL /HPF

## 2023-02-05 RX ORDER — POTASSIUM CHLORIDE 20 MEQ/1
40 TABLET, EXTENDED RELEASE ORAL 2 TIMES DAILY
Status: DISCONTINUED | OUTPATIENT
Start: 2023-02-05 | End: 2023-02-06

## 2023-02-05 RX ORDER — INSULIN LISPRO 100 [IU]/ML
1-5 INJECTION, SOLUTION INTRAVENOUS; SUBCUTANEOUS
Status: DISCONTINUED | OUTPATIENT
Start: 2023-02-05 | End: 2023-02-07 | Stop reason: HOSPADM

## 2023-02-05 RX ORDER — GUAIFENESIN/DEXTROMETHORPHAN 100-10MG/5
10 SYRUP ORAL EVERY 4 HOURS PRN
Status: DISCONTINUED | OUTPATIENT
Start: 2023-02-05 | End: 2023-02-07 | Stop reason: HOSPADM

## 2023-02-05 RX ADMIN — GUAIFENESIN SYRUP AND DEXTROMETHORPHAN 10 ML: 100; 10 SYRUP ORAL at 18:06

## 2023-02-05 RX ADMIN — PANTOPRAZOLE SODIUM 40 MG: 40 TABLET, DELAYED RELEASE ORAL at 08:00

## 2023-02-05 RX ADMIN — POTASSIUM CHLORIDE 40 MEQ: 1500 TABLET, EXTENDED RELEASE ORAL at 17:20

## 2023-02-05 RX ADMIN — CEFEPIME HYDROCHLORIDE 2000 MG: 2 INJECTION, SOLUTION INTRAVENOUS at 04:39

## 2023-02-05 RX ADMIN — FUROSEMIDE 20 MG: 20 TABLET ORAL at 09:08

## 2023-02-05 RX ADMIN — Medication 2000 UNITS: at 09:10

## 2023-02-05 RX ADMIN — B-COMPLEX W/ C & FOLIC ACID TAB 1 TABLET: TAB at 09:14

## 2023-02-05 RX ADMIN — ASPIRIN 81 MG: 81 TABLET, COATED ORAL at 09:08

## 2023-02-05 RX ADMIN — ENOXAPARIN SODIUM 40 MG: 100 INJECTION SUBCUTANEOUS at 09:10

## 2023-02-05 RX ADMIN — GUAIFENESIN SYRUP AND DEXTROMETHORPHAN 10 ML: 100; 10 SYRUP ORAL at 22:34

## 2023-02-05 RX ADMIN — METOPROLOL SUCCINATE 100 MG: 50 TABLET, EXTENDED RELEASE ORAL at 09:14

## 2023-02-05 RX ADMIN — CEFEPIME HYDROCHLORIDE 2000 MG: 2 INJECTION, SOLUTION INTRAVENOUS at 17:20

## 2023-02-05 RX ADMIN — ATORVASTATIN CALCIUM 40 MG: 40 TABLET, FILM COATED ORAL at 09:08

## 2023-02-05 RX ADMIN — FERROUS SULFATE TAB 325 MG (65 MG ELEMENTAL FE) 325 MG: 325 (65 FE) TAB at 08:00

## 2023-02-05 RX ADMIN — VANCOMYCIN HYDROCHLORIDE 1250 MG: 5 INJECTION, POWDER, LYOPHILIZED, FOR SOLUTION INTRAVENOUS at 06:21

## 2023-02-05 RX ADMIN — INSULIN LISPRO 1 UNITS: 100 INJECTION, SOLUTION INTRAVENOUS; SUBCUTANEOUS at 17:20

## 2023-02-05 RX ADMIN — VALACYCLOVIR HYDROCHLORIDE 500 MG: 500 TABLET, FILM COATED ORAL at 09:15

## 2023-02-05 RX ADMIN — POTASSIUM CHLORIDE 40 MEQ: 1500 TABLET, EXTENDED RELEASE ORAL at 12:33

## 2023-02-05 NOTE — PROGRESS NOTES
[facility]  Progress Note - Theone Breath 1945, 69 y o  male MRN: [id number]  Unit/Bed#: OVR 02 Encounter: [id number]  Primary Care Provider: Demetrius Alvarenga DO   Date and time admitted to hospital: 2/4/2023  2:09 PM    * Fever  Assessment & Plan  Patient presents due to elevated fevers  100 5 at home, 101 5 at urgent care  He denies feeling feverish, he checked his temp because he had a new thermometer and wanted to make sure it was working  · (+) cough, minimally productive for about 9 days  · Received his immunotherapy on Thursday  · WBC 12, meeting sepsis criteria  · CXR shows no acute cardiopulmonary disease on my read  Pending official read  · Lungs clear to auscultation  · No cellulitis  No diarrhea/nausea/vomiting to suspect GI infection  · Check UA although urine appears clear, low suspicion for urinary tract infection  · Procalcitonin negative, lactic WNL  · Check TSH  · Started on cefepime/Vanco, continue for now  · Follow blood cultures  · Follow RP 2 panel ordered by ED  · I suspect patient may have had a viral infection prior to his immunomodulating therapy which then precipitated a fever versus side effect of his immunomodulating therapy      Sepsis ([address] )  Assessment & Plan  SIRS: Fever, WBC 12  Source: Unclear, possibly viral  Await official read of CXR given cough  Follow RP 2 panel  Follow blood cultures  Continue antibiotics for time being    Iron deficiency anemia  Assessment & Plan  On iron pills  Hemoglobin 11 9, stable    Chronic diastolic congestive heart failure (HCC)  Assessment & Plan  Wt Readings from Last 3 Encounters:   02/04/23 73 5 kg (162 lb)   01/28/23 76 7 kg (169 lb)   12/09/22 75 2 kg (165 lb 12 6 oz)     Not in acute exacerbation  EF 79%, grade 2 diastolic dysfunction  Continue Lasix 20 mg twice daily  Patient follow-up with cardiology    Essential hypertension  Assessment & Plan  Continue metoprolol and Lasix    Hx of CABG  Assessment & Plan  Continue ASA, statin, metoprolol    Multiple myeloma not having achieved remission [facility])  Assessment & Plan  Follows with [facility] oncologist  On daratumumab and Velcade as well as dexamethasone, received his treatment last Thursday    Type 2 diabetes mellitus without complication, without long-term current use of insulin ([address] )  Assessment & Plan  Lab Results   Component Value Date    HGBA1C 8 9 (H) 02/01/2023       No results for input(s): POCGLU in the last 72 hours  Blood Sugar Average: Last 72 hrs:  Home regimen: Metformin 1000 mg twice daily  Sugars continue to be elevated, secondary to dexamethasone use every Thursday and with immunomodulating therapy  His oncologist wants him to see an endocrinologist  Endocrinology consult  Appreciate recs  Sliding scale insulin  Diabetic diet      VTE Pharmacologic Prophylaxis:   High Risk (Score >/= 5) - Pharmacological DVT Prophylaxis Ordered: enoxaparin (Lovenox)  Sequential Compression Devices Ordered  Code Status: Level 1 - Full Code   Discussion with family: Updated  (wife) at bedside  Anticipated Length of Stay: Patient will be admitted on an inpatient basis with an anticipated length of stay of greater than 2 midnights secondary to Fever in immunosuppressed patient  Total Time for Visit, including Counseling / Coordination of Care: 60 minutes Greater than 50% of this total time spent on direct patient counseling and coordination of care  Chief Complaint: fever    History of Present Illness:  Snehal Merino is a 69 y o  male with a PMH of colon cancer, lymphoma, multiple myeloma undergoing treatment at [facility], s/p CABG,  who presents with fever  Patient reports that he received a new thermometer and was testing it at home and had a fever of 100 5 °F prompting him to go to the urgent care  At urgent care, his fever went to 101 5 °F and he was referred to the emergency department  Patient denies feeling febrile    Only symptom that he reports is a mildly productive cough which has been present for the last 9 days  Of note, patient did receive his immunomodulating therapy on Thursday at [facility]  He denies any chest pain, shortness of breath, abdominal pain, nausea, vomiting, new rashes  Arrival to the ED, his vital signs are stable  He does have a mildly elevated white count at 12,000, meeting sepsis criteria  He had a CXR which did not demonstrate any cardiopulmonary disease on my read, pending official read  Blood cultures were drawn in the ED and patient was started on IV antibiotics  He will be admitted to rule out bacterial infection  Review of Systems:  Review of Systems   Constitutional: Negative for appetite change, chills, fatigue and fever  HENT: Positive for rhinorrhea (chronic "since eye radiation" )  Negative for ear pain, sore throat and trouble swallowing  Eyes: Negative for visual disturbance  Respiratory: Positive for cough  Negative for chest tightness, shortness of breath and wheezing  Cardiovascular: Negative for chest pain, palpitations and leg swelling  Gastrointestinal: Negative for abdominal distention, abdominal pain, diarrhea, nausea and vomiting  Endocrine: Negative  Genitourinary: Negative for dysuria, flank pain and hematuria  Musculoskeletal: Negative for arthralgias, gait problem and myalgias  Skin: Negative for pallor  Allergic/Immunologic: Negative for immunocompromised state  Neurological: Negative for dizziness, syncope, light-headedness, numbness and headaches         Past Medical and Surgical History:   Past Medical History:   Diagnosis Date   • Acute respiratory failure with hypoxia ([facility] [address] ) 4/20/2022   • Cataract    • Colitis    • Colon polyp    • Fatty liver    • GERD (gastroesophageal reflux disease)    • History of chemotherapy    • History of radiation therapy    • History of shingles 2018   • History of transfusion    • Hyperlipidemia    • 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[facility])        Past Surgical History:   Procedure Laterality Date   • AORTIC VALVE REPLACEMENT     • CARDIAC VALVE REPLACEMENT  2014    aorta   • CATARACT EXTRACTION Bilateral    • COLECTOMY     • COLONOSCOPY  11/2019    Prior right hemicolectomy   • CORONARY ARTERY BYPASS GRAFT     • DENTAL SURGERY     • JOINT REPLACEMENT  January 2019    left knee   • KNEE SURGERY     • LYMPH NODE BIOPSY  2003   • LYMPHADENECTOMY     • OTHER SURGICAL HISTORY      MAZE PROCEDURE   • SD ARTHRP KNE CONDYLE&PLATU MEDIAL&LAT COMPARTMENTS Left 01/28/2019    Procedure: ARTHROPLASTY LEFT KNEE TOTAL;  Surgeon: Richard Tolentino MD;  Location:  MAIN OR;  Service: Orthopedics   • SD LARYNGOSCOPY DIRECT OPERATIVE W/BIOPSY Right 04/27/2022    Procedure: DIRECT LARYNGOSCOPY WITH BIOPSY RIGHT PHARYNX, POSSIBLE RIGHT NECK LYMPH NODE BIOPSY; FROZEN SECTION;  Surgeon: Andrea Topete MD;  Location:  MAIN OR;  Service: ENT   • SKIN BIOPSY     • UPPER GASTROINTESTINAL ENDOSCOPY  11/2019    Schatzki ring   • US GUIDED LYMPH NODE BIOPSY RIGHT  12/14/2021       Meds/Allergies:  Prior to Admission medications    Medication Sig Start Date End Date Taking?  Authorizing Provider   bortezomib (VELCADE) Inject 2 5 mg under the skin see administration instructions Every 2 months   Yes Historical Provider, MD Statumumab-Hyaluronidase-fihj (DARZALEX FASPRO SC) Inject 1,800 mg under the skin Twice a month   Yes Historical Provider, MD   dexamethasone, PF, (DECADRON) 10 mg/mL 20 mg see administration instructions Twice a month   Yes Historical Provider, MD   IMMUNE GLOBULIN, HUMAN, IV Inject 30,000 mg into a catheter in a vein   Yes Historical Provider, MD   aspirin 81 MG tablet Take 1 tablet (81 mg total) by mouth daily 3/29/19   Pernell Kenny MD   atorvastatin (LIPITOR) 40 mg tablet TAKE 1 TABLET BY MOUTH EVERY DAY 8/4/22   Pernell Kenny MD   cholecalciferol (VITAMIN D3) 1,000 units tablet Take 2 tablets (2,000 Units total) by mouth daily 4/11/21   Addie Sanchez DO ferrous sulfate 324 (65 Fe) mg Take 1 tablet (324 mg total) by mouth daily with breakfast 9/29/22   Mavis Loser, DO   furosemide (LASIX) 20 mg tablet TAKE 1 TABLET BY MOUTH TWICE A DAY 1/2/23   Mavis Loser, DO   metFORMIN (GLUCOPHAGE-XR) 500 mg 24 hr tablet Take 1 tablet (500 mg total) by mouth 2 (two) times a day with meals 1/26/23   Mavis Loser, DO   metoprolol succinate (TOPROL-XL) 100 mg 24 hr tablet TAKE 1 TABLET BY MOUTH EVERY DAY 9/17/22   Mavis Loser, DO   montelukast (SINGULAIR) 10 mg tablet Take 10 mg by mouth daily 8/11/22   Historical Provider, MD   multivitamin-minerals (CENTRUM ADULTS) tablet Take 1 tablet by mouth daily 4/13/21   Rainereyes Reynoso, DO   nitroglycerin (NITROSTAT) 0 3 mg SL tablet Nitrostat 0 3 mg sublingual tablet   Place 1 tablet as needed by sublingual route as needed for 90 days  Historical Provider, MD   omeprazole (PriLOSEC) 40 MG capsule TAKE 1 CAPSULE (40 MG TOTAL) BY MOUTH DAILY  12/7/22   Mavis Loser, DO   valACYclovir (VALTREX) 500 mg tablet Take 500 mg by mouth daily 5/27/22   Historical Provider, MD     I have reviewed home medications with patient personally  Allergies: Allergies   Allergen Reactions   • Ceftin [Cefuroxime] Itching     However, has tolerated Cefazolin and Pip-Tazo since, which have different side chains  Be cautious with / avoid 2nd, 3rd, or 4th Gen Cephs that have similar side chains to Cefuroxime     • Lantus [Insulin Glargine] Itching       Social History:  Marital Status: /Civil Union   Occupation: Works in an office  Patient Pre-hospital Living Situation: Home  Patient Pre-hospital Level of Mobility: walks  Patient Pre-hospital Diet Restrictions: Diabetic  Substance Use History:   Social History     Substance and Sexual Activity   Alcohol Use Yes   • Alcohol/week: 2 0 standard drinks   • Types: 2 Cans of beer per week    Comment: very rare "beer here and there"     Social History     Tobacco Use   Smoking Status Former   • Packs/day: 1 00   • Years: 40 00   • Pack years: 40 00   • Types: Cigarettes   • Start date: 47   • Quit date: 2009   • Years since quittin 1   Smokeless Tobacco Never     Social History     Substance and Sexual Activity   Drug Use No       Family History:  Family History   Problem Relation Age of Onset   • Heart block Family    • Coronary artery disease Mother    • Thrombosis Mother    • Hypertension Mother    • Arthritis Mother    • Hyperlipidemia Mother    • Diabetes Father    • Hypertension Father    • Arthritis Father    • Sudden death Father         cardiac   • Colon cancer Paternal Grandfather    • Cancer Paternal Grandfather    • Breast cancer Sister    • Heart disease Brother         Coronary stents       Physical Exam:     Vitals:   Blood Pressure: 130/75 (23 181)  Pulse: 84 (23 181)  Temperature: 99 1 °F (37 3 °C) (23 1259)  Temp Source: Oral (23 1259)  Respirations: 19 (23)  Height: 5' 11" (180 3 cm) (23 1258)  Weight - Scale: 73 5 kg (162 lb) (23 1258)  SpO2: 97 % (23)    Physical Exam  Vitals and nursing note reviewed  Constitutional:       Appearance: Normal appearance  HENT:      Head: Normocephalic and atraumatic  Mouth/Throat:      Mouth: Mucous membranes are moist       Pharynx: Oropharynx is clear  No oropharyngeal exudate  Eyes:      Extraocular Movements: Extraocular movements intact  Cardiovascular:      Rate and Rhythm: Normal rate and regular rhythm  Pulses: Normal pulses  Heart sounds: Normal heart sounds  No murmur heard  No friction rub  No gallop  Pulmonary:      Effort: Pulmonary effort is normal  No respiratory distress  Breath sounds: Normal breath sounds  No stridor  No wheezing or rales  Abdominal:      General: Abdomen is flat  Bowel sounds are normal  There is no distension  Palpations: Abdomen is soft  Tenderness: There is no abdominal tenderness     Musculoskeletal: Right lower leg: No edema  Left lower leg: No edema  Skin:     General: Skin is warm and dry  Neurological:      General: No focal deficit present  Mental Status: He is alert and oriented to person, place, and time  Additional Data:     Lab Results:  Results from last 7 days   Lab Units 02/04/23  1450   WBC Thousand/uL 12 84*   HEMOGLOBIN g/dL 11 9*   HEMATOCRIT % 36 5   PLATELETS Thousands/uL 178   NEUTROS PCT % 76*   LYMPHS PCT % 12*   MONOS PCT % 10   EOS PCT % 0     Results from last 7 days   Lab Units 02/04/23  1450 02/01/23  0849   SODIUM mmol/L 137 143   POTASSIUM mmol/L 3 0* 3 9   CHLORIDE mmol/L 99 101   CO2 mmol/L 33* 29   BUN mg/dL 12 13   CREATININE mg/dL 1 08 0 93   ANION GAP mmol/L 5  --    CALCIUM mg/dL 9 2  --    ALBUMIN g/dL  --  4 0   TOTAL BILIRUBIN mg/dL  --  0 4   ALT IU/L  --  9   AST IU/L  --  15   GLUCOSE RANDOM mg/dL 193* 151*             Results from last 7 days   Lab Units 02/01/23  0849   HEMOGLOBIN A1C % 8 9*     Results from last 7 days   Lab Units 02/04/23  1450   LACTIC ACID mmol/L 1 2   PROCALCITONIN ng/ml 0 17       Lines/Drains:  Invasive Devices     Peripheral Intravenous Line  Duration           Peripheral IV 02/04/23 Distal;Dorsal (posterior); Right Forearm <1 day    Peripheral IV 02/04/23 Right Antecubital <1 day                    Imaging: Personally reviewed the following imaging: chest xray  XR chest 2 views    (Results Pending)       EKG and Other Studies Reviewed on Admission:   · EKG: No EKG obtained  ** Please Note: This note has been constructed using a voice recognition system   **

## 2023-02-05 NOTE — PROGRESS NOTES
[facility]  Progress Note - Jonas Rodney 1945, 69 y o  male MRN: [id number]  Unit/Bed#: OVR 02 Encounter: [id number]  Primary Care Provider: Denis Patel DO   Date and time admitted to hospital: 2/4/2023  2:09 PM    Sepsis [facility])  Assessment & Plan  SIRS: Fever, WBC elevated on presentation but on steroids  Related to rhinovirus likely  Follow blood cultures  Continue antibiotics for time being    Iron deficiency anemia  Assessment & Plan  On iron pills  Hemoglobin 11 9, stable    Hypokalemia  Assessment & Plan  Increase repletions  Check bmp tonight  Hold furosemide with decreasing potassium  Chronic diastolic congestive heart failure (HCC)  Assessment & Plan  Wt Readings from Last 3 Encounters:   02/04/23 73 5 kg (162 lb)   01/28/23 76 7 kg (169 lb)   12/09/22 75 2 kg (165 lb 12 6 oz)     Not in acute exacerbation  EF 62%, grade 2 diastolic dysfunction  Hold diuretic as above  Patient follow-up with cardiology    Essential hypertension  Assessment & Plan  Continue metoprolol and Lasix held as above  Hx of CABG  Assessment & Plan  Continue ASA, statin, metoprolol    Multiple myeloma not having achieved remission [facility])  Assessment & Plan  Follows with [facility] oncologist  On daratumumab and Velcade as well as dexamethasone, received his treatment last Thursday    Type 2 diabetes mellitus without complication, without long-term current use of insulin ([facility] [address] )  Assessment & Plan  Lab Results   Component Value Date    HGBA1C 8 9 (H) 02/01/2023       No results for input(s): POCGLU in the last 72 hours  Blood Sugar Average: Last 72 hrs:  Home regimen: Metformin 1000 mg twice daily  Sugars continue to be elevated, secondary to dexamethasone use every Thursday and with immunomodulating therapy  His oncologist wants him to see an endocrinologist  Endocrinology consult  Appreciate recs    Sliding scale insulin  Diabetic diet    * Fever  Assessment & Plan  Patient presents due to elevated fevers  100 5 at home, 101 5 at urgent care  He denies feeling feverish, he checked his temp because he had a new thermometer and wanted to make sure it was working  Likely 2/2 rhinovirus  · (+) cough, minimally productive for about 9 days  · Received his immunotherapy on Thursday  · WBC 12, meeting sepsis criteria  · CXR concerning for possible pneumonia  · Lungs clear to auscultation  · No cellulitis  No diarrhea/nausea/vomiting to suspect GI infection  · UA no clear evidence for infection  · Procalcitonin negative, lactic WNL  · Started on cefepime/Vanco, continue for now pending cultures  VTE  Prophylaxis:   Pharmacologic: in place    Patient Centered Rounds: I have performed bedside rounds with nursing staff today  Discussions with Specialists or Other Care Team Provider: case management    Education and Discussions with Family / Patient: pt      Current Length of Stay: 1 day(s)    Current Patient Status: Inpatient        Code Status: Level 1 - Full Code      Subjective:   Pt states he feels better with no fevers    Patient is seen and examined at bedside  All other ROS are negative  Objective:     Vitals:   Temp (24hrs), Av 4 °F (36 9 °C), Min:98 3 °F (36 8 °C), Max:98 5 °F (36 9 °C)    Temp:  [98 3 °F (36 8 °C)-98 5 °F (36 9 °C)] 98 5 °F (36 9 °C)  HR:  [67-85] 72  Resp:  [16-22] 16  BP: (130-159)/(60-75) 140/65  SpO2:  [92 %-97 %] 95 %  Body mass index is 22 59 kg/m²  Input and Output Summary (last 24 hours):        Intake/Output Summary (Last 24 hours) at 2023 1331  Last data filed at 2023 0510  Gross per 24 hour   Intake 440 ml   Output 900 ml   Net -460 ml       Physical Exam:       GEN: No acute distress, comfortable  HEEENT: No JVD, PERRLA, no scleral icterus  RESP: Lungs clear to auscultation bilaterally  CV: RRR, +s1/s2   ABD: SOFT NON TENDER, POSITIVE BOWEL SOUNDS, NO DISTENTION  PSYCH: CALM  NEURO: Mentation baseline, NO FOCAL DEFICITS  SKIN: NO RASH  EXTREM: NO EDEMA    Additional Data:     Labs:    Results from last 7 days   Lab Units 02/05/23  0446   WBC Thousand/uL 9 20   HEMOGLOBIN g/dL 11 1*   HEMATOCRIT % 33 3*   PLATELETS Thousands/uL 157   NEUTROS PCT % 75   LYMPHS PCT % 13*   MONOS PCT % 9   EOS PCT % 1     Results from last 7 days   Lab Units 02/05/23  0446 02/04/23  1450 02/01/23  0849   SODIUM mmol/L 139   < > 143   POTASSIUM mmol/L 2 9*   < > 3 9   CHLORIDE mmol/L 102   < > 101   CO2 mmol/L 31   < > 29   BUN mg/dL 10   < > 13   CREATININE mg/dL 0 86   < > 0 93   ANION GAP mmol/L 6   < >  --    CALCIUM mg/dL 8 3   < >  --    ALBUMIN g/dL  --   --  4 0   TOTAL BILIRUBIN mg/dL  --   --  0 4   ALT IU/L  --   --  9   AST IU/L  --   --  15   GLUCOSE RANDOM mg/dL 155*   < > 151*    < > = values in this interval not displayed  Results from last 7 days   Lab Units 02/05/23  1232   POC GLUCOSE mg/dl 201*     Results from last 7 days   Lab Units 02/01/23  0849   HEMOGLOBIN A1C % 8 9*     Results from last 7 days   Lab Units 02/05/23  0446 02/04/23  1450   LACTIC ACID mmol/L  --  1 2   PROCALCITONIN ng/ml 0 13 0 17           * I Have Reviewed All Lab Data Listed Above  Imaging:     Results for orders placed during the hospital encounter of 12/07/22    XR chest 1 view portable    Narrative  CHEST    INDICATION:   SOB  Patient has confirmed COVID-19  Positive 12/7/2022  COMPARISON:  CXR and chest CT 4/19/2022  EXAM PERFORMED/VIEWS:  XR CHEST PORTABLE      FINDINGS:    Normal heart size  AVR  CABG  Extensive consolidation in the right lung  No effusion or pneumothorax  Moderate left glenohumeral degenerative disease  Impression  Extensive right consolidation which could be due to pneumonia or asymmetric pulmonary edema  Workstation performed: [id number]    Results for orders placed during the hospital encounter of 02/04/23    XR chest 2 views    Narrative  CHEST    INDICATION:   cough      COMPARISON:  Chest radiograph December 7, 2022    EXAM PERFORMED/VIEWS:  XR CHEST PA & LATERAL      FINDINGS:  Sternotomy wires  AVR  Top normal heart size  Aortic calcifications  Interval resolution of the previously noted right lung consolidation  Minimal interstitial edema  However, there is a new patchy opacity within the left lung base, which may reflect pneumonia  There is a new small left pleural effusion  No  pneumothorax  Degenerative changes are present within the shoulders, left more than right  Impression  1  New left lower lobe patchy airspace opacity, suspicious for pneumonia  There is a small left pleural effusion  Recommend follow-up imaging in 6-8 weeks to assess resolution and treatment as clinically appropriate  2   Interval resolution of the previously noted right lung consolidation  3   Minimal interstitial edema  The study was marked for follow-up in Epic  Workstation performed: [id number]      *I have reviewed all imaging reports listed above      Recent Cultures (last 7 days):     Results from last 7 days   Lab Units 02/04/23  1501 02/04/23  1450   BLOOD CULTURE  Received in Microbiology Lab  Culture in Progress  Received in Microbiology Lab  Culture in Progress         Last 24 Hours Medication List:   Current Facility-Administered Medications   Medication Dose Route Frequency Provider Last Rate   • aluminum-magnesium hydroxide-simethicone  30 mL Oral Q6H PRN Sarah Miranda PA-C     • aspirin  81 mg Oral Daily Sarah Miranda PA-C     • atorvastatin  40 mg Oral Daily Sarah Miranda PA-C     • cefepime  2,000 mg Intravenous Q12H Filemon Do Stopped (02/05/23 0510)   • cholecalciferol  2,000 Units Oral Daily Sarah Miranda PA-C     • enoxaparin  40 mg Subcutaneous Daily Sarah Miranda PA-C     • ferrous sulfate  325 mg Oral Daily With [address], PA-C     • insulin lispro  1-5 Units Subcutaneous TID Macarena Arenas MD     • metoprolol succinate  100 mg Oral Daily Holli Yen PA-C     • multivitamin stress formula  1 tablet Oral Daily Holli Yen PA-C     • pantoprazole  40 mg Oral Early Morning Holli Yen PA-C     • potassium chloride  40 mEq Oral BID Faby Riley MD     • valACYclovir  500 mg Oral Daily Holli Yen [place]     • vancomycin  1,250 mg Intravenous Q24H Holli Yen PA-C Stopped (02/05/23 0545)        Today, Patient Was Seen By: Faby Riley MD    ** Please Note: Dictation voice to text software may have been used in the creation of this document   **

## 2023-02-05 NOTE — UTILIZATION REVIEW
E  NOTIFICATION OF INPATIENT ADMISSION   AUTHORIZATION REQUEST   SERVICING FACILITY:   [facility]  [address] [place] [address]  Tax ID: [id number]  NPI: [id number] ATTENDING PROVIDER:  Attending Name and NPI#: Roger Willson [[id number]]  Address: [address] [place] [address]  Phone: [phone]   ADMISSION INFORMATION:  Place of Service: [place] [address]  Place of Service Code: 21  Inpatient Admission Date/Time: 2/4/23  3:50 PM  Discharge Date/Time: No discharge date for patient encounter  Admitting Diagnosis Code/Description:  Cough [R05 9]  Hypokalemia [E87 6]  Flu [J11 1]  Leukocytosis [D72 829]  Fever [R50 9]  Personal history of immunosupression therapy [Z92 25]  Flu-like symptoms [R68 89]  On antineoplastic chemotherapy [Z79 899]  Immunosuppression due to drug therapy [facility]) [W43 350, Z79 899]     UTILIZATION REVIEW CONTACT:  Elizabeth Barreto, Utilization   Network Utilization Review Department  Phone: [phone]  Fax [phone]  Email: [email or web address]  Contact for approvals/pending authorizations, clinical reviews, and discharge  PHYSICIAN ADVISORY SERVICES:  Medical Necessity Denial & [employer] Review  Phone: [phone]  Fax: [phone]  Email: [email or web address]

## 2023-02-05 NOTE — ASSESSMENT & PLAN NOTE
Patient presents due to elevated fevers  100 5 at home, 101 5 at urgent care  He denies feeling feverish, he checked his temp because he had a new thermometer and wanted to make sure it was working  · (+) cough, minimally productive for about 9 days  · Received his immunotherapy on Thursday  · WBC 12, meeting sepsis criteria  · CXR shows no acute cardiopulmonary disease on my read  Pending official read  · Lungs clear to auscultation  · No cellulitis  No diarrhea/nausea/vomiting to suspect GI infection  · Check UA although urine appears clear, low suspicion for urinary tract infection  · Procalcitonin negative, lactic WNL  · Check TSH  · Started on cefepime/Vanco, continue for now  · Follow blood cultures  · Follow RP 2 panel ordered by ED  · I suspect patient may have had a viral infection prior to his immunomodulating therapy which then precipitated a fever versus side effect of his immunomodulating therapy

## 2023-02-05 NOTE — PROGRESS NOTES
Dominic Doshi is a 69 y o  male who is currently ordered Vancomycin IV with management by the Pharmacy Consult service  Relevant clinical data and objective / subjective history reviewed  Vancomycin Assessment:  Indication and Goal AUC/Trough: Other, Fever in immunosupressed patient, -600, trough >10  Clinical Status: stable  Micro:     Renal Function:  SCr: 0 86 mg/dL  CrCl: 76 mL/min  Renal replacement: Not on dialysis  Days of Therapy: 2  Current Dose: 1250mg every 24 hours  Vancomycin Plan:  New Dosing: No change  Estimated AUC: 411 mcg*hr/mL  Estimated Trough: 11 2 mcg/mL  Next Level: 2/7/23 at 0600  Renal Function Monitoring: Daily BMP and UOP  Pharmacy will continue to follow closely for s/sx of nephrotoxicity, infusion reactions and appropriateness of therapy  BMP and CBC will be ordered per protocol  We will continue to follow the patient’s culture results and clinical progress daily      Regino Zazueta, Pharmacist, PharmD, BCPS

## 2023-02-05 NOTE — ASSESSMENT & PLAN NOTE
SIRS: Fever, WBC 12  Source: Unclear, possibly viral  Await official read of CXR given cough  Follow RP 2 panel  Follow blood cultures  Continue antibiotics for time being

## 2023-02-05 NOTE — ASSESSMENT & PLAN NOTE
SIRS: Fever, WBC elevated on presentation but on steroids  Related to rhinovirus likely     Follow blood cultures  Continue antibiotics for time being

## 2023-02-05 NOTE — NURSING NOTE
Assumed care of pt at 2300 in ER holding (IR room 2)  Pt slept during more than half of hrly rounds overnight, voiding qs in urinal  Denied discomfort, afebrile, occas np cough

## 2023-02-05 NOTE — UTILIZATION REVIEW
Initial Clinical Review    Admission: Date/Time/Statement:   Admission Orders (From admission, onward)     Ordered        02/04/23 1550  INPATIENT ADMISSION  Once                      Orders Placed This Encounter   Procedures   • INPATIENT ADMISSION     Standing Status:   Standing     Number of Occurrences:   1     Order Specific Question:   Level of Care     Answer:   Med Surg [16]     Order Specific Question:   Estimated length of stay     Answer:   More than 2 Midnights     Order Specific Question:   Certification     Answer:   I certify that inpatient services are medically necessary for this patient for a duration of greater than two midnights  See H&P and MD Progress Notes for additional information about the patient's course of treatment  ED Arrival Information     Expected   -    Arrival   2/4/2023 12:39    Acuity   Urgent            Means of arrival   Walk-In    Escorted by   Spouse    Service   Hospitalist    Admission type   Emergency            Arrival complaint   Fever; Infection           Chief Complaint   Patient presents with   • Flu Symptoms     Pt reports a cough that started about a week and a half ago, and then today took his temp and had a low grade  Has had pneumonia in the past and is a cancer patient so he wanted to make sure it wasn't happening again      Initial Presentation: 69 y o  male   with a PMH of colon cancer, lymphoma, multiple myeloma undergoing treatment at [facility], s/p CABG,  who presents with fever of 100 5 °F   prompting him to go to the urgent care   At urgent care, his fever went to 101 5 °F  - sent to ER  Patient denies feeling febrile   Only symptom that he reports is a mildly productive cough  Of note, patient did receive his immunomodulating therapy on Thursday at [address] as well as dexamethasone    Arrival to the ED,  VSS    SIRS: Fever, WBC elevated on presentation but on steroids  Blood cultures initiated,  Lungs clear to auscultation  UA no clear evidence for infection  Procalcitonin negative, lactic WNL    Suspect  rhinovirus  CXR concerning for possible pneumonia  Admit IP status,  MS  Level of care for management of suspected rhinovirus in immunocompromised patient,  r/o bacterial infection:  Initiate IVABs  Follow blood cultures  Replete electrolyte prn  Check bmp tonight  Hold furosemide with decreasing potassium  Date: 2/5       Day 2:   Pt states he feels better with no fevers  Wbc decreased to 92 today   -  Will   Consult endocrinology - cont sliding scale insulin  ED Triage Vitals   Temperature Pulse Respirations Blood Pressure SpO2   02/04/23 1259 02/04/23 1259 02/04/23 1259 02/04/23 1259 02/04/23 1259   99 1 °F (37 3 °C) 91 18 132/61 96 %      Temp Source Heart Rate Source Patient Position - Orthostatic VS BP Location FiO2 (%)   02/04/23 1259 02/04/23 1259 02/05/23 0754 02/04/23 2300 --   Oral Monitor Sitting Left arm       Pain Score       02/04/23 1500       No Pain          Wt Readings from Last 1 Encounters:   02/04/23 73 5 kg (162 lb)     Additional Vital Signs:    02/05/23 1300 98 8 °F (37 1 °C) 80 16 140/67 96 -- --   02/05/23 0914 -- 72 -- 140/65 -- -- --   02/05/23 0754 -- 68 16 144/63 91 95 % None (Room air)   02/05/23 0400 98 5 °F (36 9 °C) 68 20 -- -- 93 % None (Room air)   02/05/23 0000 -- 67 22 -- -- 92 % None (Room air)   02/04/23 2300 98 3 °F (36 8 °C) 68 20 138/63 -- 94 % None (Room air)   02/04/23 2043 -- -- -- 159/68 -- -- --   02/04/23 1815 -- 84 19 130/75 -- 97 % --   02/04/23 1500 -- 84 18 137/68 95 96 % None (Room air)   02/04/23 1415 -- 85 18 132/60 87 94 % --     Pertinent Labs/Diagnostic Test Results:   2/4  EKG -  nsr w/Non-specific (persistent st depressions compared to prior  EKGs)     XR chest 2 views   Final Result by Katelynn Gil MD (02/05 5960)   1  New left lower lobe patchy airspace opacity, suspicious for pneumonia    There is a small left pleural effusion  Recommend follow-up imaging in 6-8 weeks to assess resolution and treatment as clinically appropriate  2   Interval resolution of the previously noted right lung consolidation  3   Minimal interstitial edema  Results from last 7 days   Lab Units 02/04/23  1632 02/04/23  1302   SARS-COV-2  Not Detected Negative     Results from last 7 days   Lab Units 02/05/23  0446 02/04/23  1450 02/01/23  0849   WBC Thousand/uL 9 20 12 84*  --    WHITE BLOOD CELL COUNT  x10E3/uL  --   --  8 5   HEMOGLOBIN g/dL 11 1* 11 9*  --    HEMOGLOBIN  g/dL  --   --  13 1   HEMATOCRIT % 33 3* 36 5  --    HEMATOCRIT  %  --   --  38 2   PLATELETS Thousands/uL 157 178  --    PLATELETS  C98J6/HK  --   --  236   NEUTROS ABS Thousands/µL 7 00 9 88*  --    NEUTROS ABS   x10E3/uL  --   --  5 7         Results from last 7 days   Lab Units 02/05/23  0446 02/04/23  1450 02/01/23  0849   SODIUM mmol/L 139 137 143   POTASSIUM mmol/L 2 9* 3 0* 3 9   CHLORIDE mmol/L 102 99 101   CO2 mmol/L 31 33* 29   ANION GAP mmol/L 6 5  --    BUN mg/dL 10 12 13   CREATININE mg/dL 0 86 1 08 0 93   EGFR ml/min/1 73sq m 84 66 85   CALCIUM mg/dL 8 3 9 2  --      Results from last 7 days   Lab Units 02/01/23  0849   AST IU/L 15   ALT IU/L 9   TOTAL PROTEIN g/dL 6 3   ALBUMIN g/dL 4 0   TOTAL BILIRUBIN mg/dL 0 4     Results from last 7 days   Lab Units 02/05/23  1232   POC GLUCOSE mg/dl 201*     Results from last 7 days   Lab Units 02/05/23  0446 02/04/23  1450 02/01/23  0849   GLUCOSE RANDOM mg/dL 155* 193* 151*         Results from last 7 days   Lab Units 02/01/23  0849   HEMOGLOBIN A1C % 8 9*   EAG mg/dL 209     Results from last 7 days   Lab Units 02/04/23  1450   TSH 3RD GENERATON uIU/mL 1 434     Results from last 7 days   Lab Units 02/05/23  0446 02/04/23  1450   PROCALCITONIN ng/ml 0 13 0 17     Results from last 7 days   Lab Units 02/04/23  1450   LACTIC ACID mmol/L 1 2     Results from last 7 days   Lab Units 02/04/23  2338 02/01/23  0849 CLARITY UA  Clear  --    COLOR UA  Yellow  --    SPEC GRAV UA  1 015  --    PH UA  6 0  --    GLUCOSE UA mg/dl 30 (3/100%)*  --    KETONES UA mg/dl Negative  --    BLOOD UA  Negative  --    PROTEIN UA mg/dl 30 (1+)*  --    NITRITE UA  Negative  --    BILIRUBIN UA  Negative  --    UROBILINOGEN UA (BE) mg/dl <2 0  --    LEUKOCYTES UA  Negative  --    WBC UA /hpf None Seen  --    RBC UA /hpf None Seen  --    BACTERIA UA /hpf Occasional  --    EPITHELIAL CELLS WET PREP /hpf Occasional  --    CREATININE UR mg/dL  --  59 1     Results from last 7 days   Lab Units 02/04/23  1632 02/04/23  1302   INFLUENZA A PCR   --  Negative   INFLUENZA B PCR   --  Negative   RSV PCR   --  Negative   RESPIRATORY SYNCYTIAL VIRUS  Not Detected  --      Results from last 7 days   Lab Units 02/04/23  1632   ADENOVIRUS  Not Detected   BORDETELLA PARAPERTUSSIS  Not Detected   BORDETELLA PERTUSSIS  Not Detected   CHLAMYDIA PNEUMONIAE  Not Detected   CORONAVIRUS 229E  Not Detected   CORONAVIRUS HKU1  Not Detected   CORONAVIRUS NL63  Not Detected   CORONAVIRUS OC43  Not Detected   METAPNEUMOVIRUS  Not Detected   RHINOVIRUS  Detected*   MYCOPLASMA PNEUMONIAE  Not Detected   PARAINFLUENZA 1  Not Detected   PARAINFLUENZA 2  Not Detected   PARAINFLUENZA 3  Not Detected   PARAINFLUENZA 4  Not Detected     Results from last 7 days   Lab Units 02/04/23  1501 02/04/23  1450   BLOOD CULTURE  Received in Microbiology Lab  Culture in Progress  Received in Microbiology Lab  Culture in Progress     ED Treatment:   Medication Administration from 02/04/2023 1238 to 02/05/2023 1412       Date/Time Order Dose Route Action     02/04/2023 1542 EST potassium chloride 20 mEq IVPB (premix) 20 mEq Intravenous New Bag     02/04/2023 1539 EST potassium chloride (K-DUR,KLOR-CON) CR tablet 20 mEq 20 mEq Oral Given     02/04/2023 1632 EST cefepime (MAXIPIME) IVPB (premix in dextrose) 2,000 mg 50 mL 2,000 mg Intravenous New Bag     02/04/2023 1656 EST vancomycin (VANCOCIN) 1,750 mg in sodium chloride 0 9 % 500 mL IVPB 1,750 mg Intravenous New Bag     02/05/2023 0908 EST aspirin (ECOTRIN LOW STRENGTH) EC tablet 81 mg 81 mg Oral Given     02/05/2023 0908 EST atorvastatin (LIPITOR) tablet 40 mg 40 mg Oral Given     02/05/2023 0910 EST cholecalciferol (VITAMIN D3) tablet 2,000 Units 2,000 Units Oral Given     02/05/2023 0800 EST ferrous sulfate tablet 325 mg 325 mg Oral Given     02/05/2023 0908 EST furosemide (LASIX) tablet 20 mg 20 mg Oral Given     02/04/2023 2042 EST furosemide (LASIX) tablet 20 mg 20 mg Oral Given     02/05/2023 0914 EST metoprolol succinate (TOPROL-XL) 24 hr tablet 100 mg 100 mg Oral Given     02/05/2023 0914 EST multivitamin stress formula tablet 1 tablet 1 tablet Oral Given     02/05/2023 0800 EST pantoprazole (PROTONIX) EC tablet 40 mg 40 mg Oral Given     02/05/2023 0915 EST valACYclovir (VALTREX) tablet 500 mg 500 mg Oral Given     02/05/2023 0910 EST enoxaparin (LOVENOX) subcutaneous injection 40 mg 40 mg Subcutaneous Given     02/05/2023 0439 EST cefepime (MAXIPIME) IVPB (premix in dextrose) 2,000 mg 50 mL 2,000 mg Intravenous New Bag     02/05/2023 0621 EST vancomycin (VANCOCIN) 1250 mg in sodium chloride 0 9% 250 mL IVPB 1,250 mg Intravenous New Bag     02/05/2023 1233 EST potassium chloride (K-DUR,KLOR-CON) CR tablet 40 mEq 40 mEq Oral Given        Past Medical History:   Diagnosis Date   • Acute respiratory failure with hypoxia ([facility] ) 4/20/2022   • Cataract    • Colitis    • Colon polyp    • Fatty liver    • GERD (gastroesophageal reflux disease)    • History of chemotherapy    • History of radiation therapy    • History of shingles 2018   • History of transfusion    • Hyperlipidemia    • 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[facility] )      Present on Admission:  • Essential hypertension  • Chronic diastolic congestive heart failure (HCC)  • Multiple myeloma not having achieved remission ([facility] )      Admitting Diagnosis: Cough [R05 9]  Hypokalemia [E87 6]  Flu [J11 1]  Leukocytosis [B27 395]  Fever [R50 9]  Personal history of immunosupression therapy [Z92 25]  Flu-like symptoms [R68 89]  On antineoplastic chemotherapy [Z79 899]  Immunosuppression due to drug therapy [facility]) [F92 285, Z79 899]  Age/Sex: 69 y o  male  Admission Orders:   See above note; Lo carb diet    Scheduled Medications:  aspirin, 81 mg, Oral, Daily  atorvastatin, 40 mg, Oral, Daily  cefepime, 2,000 mg, Intravenous, Q12H  cholecalciferol, 2,000 Units, Oral, Daily  enoxaparin, 40 mg, Subcutaneous, Daily  ferrous sulfate, 325 mg, Oral, Daily With Breakfast  insulin lispro, 1-5 Units, Subcutaneous, TID AC  metoprolol succinate, 100 mg, Oral, Daily  multivitamin stress formula, 1 tablet, Oral, Daily  pantoprazole, 40 mg, Oral, Early Morning  potassium chloride, 40 mEq, Oral, BID  valACYclovir, 500 mg, Oral, Daily  vancomycin, 1,250 mg, Intravenous, Q24H      Continuous IV Infusions:     PRN Meds:  aluminum-magnesium hydroxide-simethicone, 30 mL, Oral, Q6H PRN        IP CONSULT TO PHARMACY    Network Utilization Review Department  ATTENTION: Please call with any questions or concerns to [phone] and carefully listen to the prompts so that you are directed to the right person  All voicemails are confidential   Alex Germain all requests for admission clinical reviews, approved or denied determinations and any other requests to dedicated fax number below belonging to the campus where the patient is receiving treatment   List of dedicated fax numbers for the Facilities:  [address] [place] [address] DENIALS (Administrative/Medical Necessity) [phone]   [address] [place] [address] (Maternity/NICU/Pediatrics) [phone]   [address] [facility]  [phone]   [facility] [address] [place] [phone] [address] [facility] [place] [address] [phone]

## 2023-02-05 NOTE — PROGRESS NOTES
Vancomycin IV Pharmacy-to-Dose Consultation    Tra Douglas is a 69 y o  male who is currently receiving Vancomycin IV with management by the Pharmacy Consult service  Relevant clinical data and objective / subjective history reviewed  Vancomycin Assessment:  Indication: Fever in the setting of immunosuppression; rule out bacteremia  Status: stable, leukocytosis, tmax 99 1  Micro:   2/4 Urine culture: in process  2/4 Respiratory panel: in process  2/4 Blood cultures x 2: in process  2/4 COVID/FLU/RSV: negative  Procalcitonin: 0 17  Renal Function: Stable, sCr 1 08, CrCl 60 mL/min  Days of Therapy: 1  Current Dose: 1750 mg IV x 1 (last dose: 2/4 at 1656)  Goal AUC(24h)/Trough: 400-600, trough >10    Vancomycin Plan:  New Dosing: change to 1250 mg IV q24h (next dose: 2/5 at 0630)  Estimated AUC: 475 mcg*hr/mL  Estimated Trough: 13 8 mcg/mL  Next Level: Random 2/7 at 0600  Renal Function Monitoring: Daily BMP and [place] [address] will continue to follow closely for s/sx of nephrotoxicity, infusion reactions and appropriateness of therapy  BMP and CBC will be ordered per protocol  We will continue to follow the patient’s culture results and clinical progress daily       Jermaine Batista, PharmD, [address] Clinical Pharmacist  [phone]

## 2023-02-05 NOTE — ASSESSMENT & PLAN NOTE
Increase repletions  Check bmp tonight  Hold furosemide with decreasing potassium  Pt reports chest tightness and heaviness that began 1 week ago. Pt had holter monitor this past Saturday and it was returned Sunday. Pt is due to see PCP on Thursday.     Pt has hx of mitral valve prolapse since 2002.  Pt reports ankle swelling. Breathing feels like it is an effort.     Denies other medical hx.

## 2023-02-05 NOTE — ASSESSMENT & PLAN NOTE
Wt Readings from Last 3 Encounters:   02/04/23 73 5 kg (162 lb)   01/28/23 76 7 kg (169 lb)   12/09/22 75 2 kg (165 lb 12 6 oz)     Not in acute exacerbation  EF 89%, grade 2 diastolic dysfunction  Continue Lasix 20 mg twice daily  Patient follow-up with cardiology

## 2023-02-05 NOTE — ASSESSMENT & PLAN NOTE
Wt Readings from Last 3 Encounters:   02/04/23 73 5 kg (162 lb)   01/28/23 76 7 kg (169 lb)   12/09/22 75 2 kg (165 lb 12 6 oz)     Not in acute exacerbation  EF 95%, grade 2 diastolic dysfunction  Hold diuretic as above    Patient follow-up with cardiology

## 2023-02-05 NOTE — ASSESSMENT & PLAN NOTE
Follows with [facility]ASU VEGA oncologist  On daratumumab and Velcade as well as dexamethasone, received his treatment last Thursday

## 2023-02-05 NOTE — H&P
Otto Macias  H&P- Dominic Glenda 1945, 69 y o  male MRN: [id number]  Unit/Bed#: OVR 02 Encounter: [id number]  Primary Care Provider: Mireya Valenzuela DO   Date and time admitted to hospital: 2/4/2023  2:09 PM    * Fever  Assessment & Plan  Patient presents due to elevated fevers  100 5 at home, 101 5 at urgent care  He denies feeling feverish, he checked his temp because he had a new thermometer and wanted to make sure it was working  · (+) cough, minimally productive for about 9 days  · Received his immunotherapy on Thursday  · WBC 12, meeting sepsis criteria  · CXR shows no acute cardiopulmonary disease on my read  Pending official read  · Lungs clear to auscultation  · No cellulitis  No diarrhea/nausea/vomiting to suspect GI infection  · Check UA although urine appears clear, low suspicion for urinary tract infection  · Procalcitonin negative, lactic WNL  · Check TSH  · Started on cefepime/Vanco, continue for now  · Follow blood cultures  · Follow RP 2 panel ordered by ED  · I suspect patient may have had a viral infection prior to his immunomodulating therapy which then precipitated a fever versus side effect of his immunomodulating therapy      Sepsis ([facility] [address] )  Assessment & Plan  SIRS: Fever, WBC 12  Source: Unclear, possibly viral  Await official read of CXR given cough  Follow RP 2 panel  Follow blood cultures  Continue antibiotics for time being    Chronic diastolic congestive heart failure (HCC)  Assessment & Plan  Wt Readings from Last 3 Encounters:   02/04/23 73 5 kg (162 lb)   01/28/23 76 7 kg (169 lb)   12/09/22 75 2 kg (165 lb 12 6 oz)     Not in acute exacerbation  EF 75%, grade 2 diastolic dysfunction  Continue Lasix 20 mg twice daily  Patient follow-up with cardiology    Essential hypertension  Assessment & Plan  Continue metoprolol and Lasix    Hx of CABG  Assessment & Plan  Continue ASA, statin, metoprolol    Multiple myeloma not having achieved remission [facility])  Assessment & Plan  Follows with [address] [place] [address] oncologist  On daratumumab and Velcade as well as dexamethasone, received his treatment last Thursday      VTE Pharmacologic Prophylaxis:   High Risk (Score >/= 5) - Pharmacological DVT Prophylaxis Ordered: enoxaparin (Lovenox)  Sequential Compression Devices Ordered  Code Status: Level 1 - Full Code   Discussion with family: Updated  (wife) at bedside      Anticipated Length of Stay: Patient will be admitted on an inpatient basis with an anticipated length of stay of greater than 2 midnights secondary to Fever in immunosuppressed patient      Total Time for Visit, including Counseling / Coordination of Care: 60 minutes Greater than 50% of this total time spent on direct patient counseling and coordination of care      Chief Complaint: fever     History of Present Illness:  Kermit Canavan is a 69 y o  male with a PMH of colon cancer, lymphoma, multiple myeloma undergoing treatment at [address] [place] [address], s/p CABG,  who presents with fever  Patient reports that he received a new thermometer and was testing it at home and had a fever of 100 5 °F prompting him to go to the urgent care  At urgent care, his fever went to 101 5 °F and he was referred to the emergency department  Patient denies feeling febrile  Only symptom that he reports is a mildly productive cough which has been present for the last 9 days  Of note, patient did receive his immunomodulating therapy on Thursday at [address] [place] [address]  He denies any chest pain, shortness of breath, abdominal pain, nausea, vomiting, new rashes  Arrival to the ED, his vital signs are stable  He does have a mildly elevated white count at 12,000, meeting sepsis criteria  He had a CXR which did not demonstrate any cardiopulmonary disease on my read, pending official read  Blood cultures were drawn in the ED and patient was started on IV antibiotics    He will be admitted to rule out bacterial infection      Review of Systems:  Review of Systems   Constitutional: Negative for appetite change, chills, fatigue and fever  HENT: Positive for rhinorrhea (chronic "since eye radiation" )  Negative for ear pain, sore throat and trouble swallowing  Eyes: Negative for visual disturbance  Respiratory: Positive for cough  Negative for chest tightness, shortness of breath and wheezing  Cardiovascular: Negative for chest pain, palpitations and leg swelling  Gastrointestinal: Negative for abdominal distention, abdominal pain, diarrhea, nausea and vomiting  Endocrine: Negative  Genitourinary: Negative for dysuria, flank pain and hematuria  Musculoskeletal: Negative for arthralgias, gait problem and myalgias  Skin: Negative for pallor  Allergic/Immunologic: Negative for immunocompromised state     Neurological: Negative for dizziness, syncope, light-headedness, numbness and headaches          Past Medical and Surgical History:   Medical History   Past Medical History:   Diagnosis Date   • Acute respiratory failure with hypoxia ([facility] [address] ) 4/20/2022   • Cataract     • Colitis     • Colon polyp     • Fatty liver     • GERD (gastroesophageal reflux disease)     • History of chemotherapy     • History of radiation therapy     • History of shingles 2018   • History of transfusion     • Hyperlipidemia     • 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HCC)              Surgical History         Past Surgical History:   Procedure Laterality Date   • AORTIC VALVE REPLACEMENT       • CARDIAC VALVE REPLACEMENT   2014     aorta   • CATARACT EXTRACTION Bilateral     • COLECTOMY       • COLONOSCOPY   11/2019     Prior right hemicolectomy   • CORONARY ARTERY BYPASS GRAFT       • DENTAL SURGERY       • JOINT REPLACEMENT   January 2019     left knee   • KNEE SURGERY       • LYMPH NODE BIOPSY   2003   • LYMPHADENECTOMY       • OTHER SURGICAL HISTORY         MAZE PROCEDURE   • MA ARTHRP KNE CONDYLE&PLATU MEDIAL&LAT COMPARTMENTS Left 01/28/2019     Procedure: ARTHROPLASTY LEFT KNEE TOTAL;  Surgeon: Tucker Goss MD;  Location:  MAIN OR;  Service: Orthopedics   • IA LARYNGOSCOPY DIRECT OPERATIVE W/BIOPSY Right 04/27/2022     Procedure: DIRECT LARYNGOSCOPY WITH BIOPSY RIGHT PHARYNX, POSSIBLE RIGHT NECK LYMPH NODE BIOPSY; FROZEN SECTION;  Surgeon: Jenifer Pelayo MD;  Location:  MAIN OR;  Service: ENT   • SKIN BIOPSY       • UPPER GASTROINTESTINAL ENDOSCOPY   11/2019     Schatzki ring   • US GUIDED LYMPH NODE BIOPSY RIGHT   12/14/2021            Meds/Allergies:          Prior to Admission medications    Medication Sig Start Date End Date Taking?  Authorizing Provider   bortezomib (VELCADE) Inject 2 5 mg under the skin see administration instructions Every 2 months     Yes Historical Provider, MD   Daratumumab-Hyaluronidase-fihj ([facility] [place]) Inject 1,800 mg under the skin Twice a month     Yes Historical Provider, MD   dexamethasone, PF, (DECADRON) 10 mg/mL 20 mg see administration instructions Twice a month     Yes Historical Provider, MD   IMMUNE GLOBULIN, HUMAN, IV Inject 30,000 mg into a catheter in a vein     Yes Historical Provider, MD   aspirin 81 MG tablet Take 1 tablet (81 mg total) by mouth daily 3/29/19     Lb Patel MD   atorvastatin (LIPITOR) 40 mg tablet TAKE 1 TABLET BY MOUTH EVERY DAY 8/4/22     Lb Patel MD   cholecalciferol (VITAMIN D3) 1,000 units tablet Take 2 tablets (2,000 Units total) by mouth daily 4/11/21     Nikia Chris, DO   ferrous sulfate 324 (65 Fe) mg Take 1 tablet (324 mg total) by mouth daily with breakfast 9/29/22     Radha Pettit DO   furosemide (LASIX) 20 mg tablet TAKE 1 TABLET BY MOUTH TWICE A DAY 1/2/23     Aden Segura DO   metFORMIN (GLUCOPHAGE-XR) 500 mg 24 hr tablet Take 1 tablet (500 mg total) by mouth 2 (two) times a day with meals 1/26/23     Aden Segura DO   metoprolol succinate (TOPROL-XL) 100 mg 24 hr tablet TAKE 1 TABLET BY MOUTH EVERY DAY 9/17/22     Radha Pettit DO   montelukast (SINGULAIR) 10 mg tablet Take 10 mg by mouth daily 22     Historical Provider, MD   multivitamin-minerals (CENTRUM ADULTS) tablet Take 1 tablet by mouth daily 21     Nikia Perez DO   nitroglycerin (NITROSTAT) 0 3 mg SL tablet Nitrostat 0 3 mg sublingual tablet   Place 1 tablet as needed by sublingual route as needed for 90 days        Historical Provider, MD   omeprazole (PriLOSEC) 40 MG capsule TAKE 1 CAPSULE (40 MG TOTAL) BY MOUTH DAILY  22     Patric Kern DO   valACYclovir (VALTREX) 500 mg tablet Take 500 mg by mouth daily 22     Historical Provider, MD GIPSON have reviewed home medications with patient personally      Allergies: Allergies   Allergen Reactions   • Ceftin [Cefuroxime] Itching       However, has tolerated Cefazolin and Pip-Tazo since, which have different side chains  Be cautious with / avoid 2nd, 3rd, or 4th Gen Cephs that have similar side chains to Cefuroxime     • Lantus [Insulin Glargine] Itching         Social History:  Marital Status: /Civil Union   Occupation: Works in an office  Patient Pre-hospital Living Situation: Home  Patient Pre-hospital Level of Mobility: walks  Patient Pre-hospital Diet Restrictions: Diabetic  Substance Use History:   Social History           Substance and Sexual Activity   Alcohol Use Yes   • Alcohol/week: 2 0 standard drinks   • Types: 2 Cans of beer per week     Comment: very rare "beer here and there"      Social History           Tobacco Use   Smoking Status Former   • Packs/day: 1 00   • Years: 40 00   • Pack years: 40 00   • Types: Cigarettes   • Start date: 47   • Quit date: 2009   • Years since quittin 1   Smokeless Tobacco Never      Social History          Substance and Sexual Activity   Drug Use No         Family History:  Family History         Family History   Problem Relation Age of Onset   • Heart block Family     • Coronary artery disease Mother     • Thrombosis Mother     • Hypertension Mother     • Arthritis Mother     • Hyperlipidemia Mother     • Diabetes Father     • Hypertension Father     • Arthritis Father     • Sudden death Father           cardiac   • Colon cancer Paternal Grandfather     • Cancer Paternal Grandfather     • Breast cancer Sister     • Heart disease Brother           Coronary stents            Physical Exam:      Vitals:   Blood Pressure: 130/75 (02/04/23 1815)  Pulse: 84 (02/04/23 1815)  Temperature: 99 1 °F (37 3 °C) (02/04/23 1259)  Temp Source: Oral (02/04/23 1259)  Respirations: 19 (02/04/23 1815)  Height: 5' 11" (180 3 cm) (02/04/23 1258)  Weight - Scale: 73 5 kg (162 lb) (02/04/23 1258)  SpO2: 97 % (02/04/23 1815)     Physical Exam  Vitals and nursing note reviewed  Constitutional:       Appearance: Normal appearance  HENT:      Head: Normocephalic and atraumatic  Mouth/Throat:      Mouth: Mucous membranes are moist       Pharynx: Oropharynx is clear  No oropharyngeal exudate  Eyes:      Extraocular Movements: Extraocular movements intact  Cardiovascular:      Rate and Rhythm: Normal rate and regular rhythm  Pulses: Normal pulses  Heart sounds: Normal heart sounds  No murmur heard  No friction rub  No gallop  Pulmonary:      Effort: Pulmonary effort is normal  No respiratory distress  Breath sounds: Normal breath sounds  No stridor  No wheezing or rales  Abdominal:      General: Abdomen is flat  Bowel sounds are normal  There is no distension  Palpations: Abdomen is soft  Tenderness: There is no abdominal tenderness  Musculoskeletal:      Right lower leg: No edema  Left lower leg: No edema  Skin:     General: Skin is warm and dry  Neurological:      General: No focal deficit present        Mental Status: He is alert and oriented to person, place, and time           Additional Data:      Lab Results:       Results from last 7 days   Lab Units 02/04/23  1450   WBC Thousand/uL 12 84*   HEMOGLOBIN g/dL 11 9*   HEMATOCRIT % 36 5   PLATELETS Thousands/uL 178   NEUTROS PCT % 76*   LYMPHS PCT % 12*   MONOS PCT % 10   EOS PCT % 0            Results from last 7 days   Lab Units 02/04/23  1450 02/01/23  0849   SODIUM mmol/L 137 143   POTASSIUM mmol/L 3 0* 3 9   CHLORIDE mmol/L 99 101   CO2 mmol/L 33* 29   BUN mg/dL 12 13   CREATININE mg/dL 1 08 0 93   ANION GAP mmol/L 5  --    CALCIUM mg/dL 9 2  --    ALBUMIN g/dL  --  4 0   TOTAL BILIRUBIN mg/dL  --  0 4   ALT IU/L  --  9   AST IU/L  --  15   GLUCOSE RANDOM mg/dL 193* 151*                   Results from last 7 days   Lab Units 02/01/23  0849   HEMOGLOBIN A1C % 8 9*           Results from last 7 days   Lab Units 02/04/23  1450   LACTIC ACID mmol/L 1 2   PROCALCITONIN ng/ml 0 17         Lines/Drains:      Invasive Devices      Peripheral Intravenous Line  Duration             Peripheral IV 02/04/23 Distal;Dorsal (posterior); Right Forearm <1 day     Peripheral IV 02/04/23 Right Antecubital <1 day                          Imaging: Personally reviewed the following imaging: chest xray  XR chest 2 views    (Results Pending)         EKG and Other Studies Reviewed on Admission:   • EKG: No EKG obtained      ** Please Note: This note has been constructed using a voice recognition system   **

## 2023-02-05 NOTE — ASSESSMENT & PLAN NOTE
Patient presents due to elevated fevers  100 5 at home, 101 5 at urgent care  He denies feeling feverish, he checked his temp because he had a new thermometer and wanted to make sure it was working  Likely 2/2 rhinovirus  · (+) cough, minimally productive for about 9 days  · Received his immunotherapy on Thursday  · WBC 12, meeting sepsis criteria  · CXR concerning for possible pneumonia  · Lungs clear to auscultation  · No cellulitis  No diarrhea/nausea/vomiting to suspect GI infection  · UA no clear evidence for infection  · Procalcitonin negative, lactic WNL  · Started on cefepime/Vanco, continue for now pending cultures

## 2023-02-06 LAB
ANION GAP SERPL CALCULATED.3IONS-SCNC: 7 MMOL/L (ref 4–13)
BACTERIA UR CULT: NORMAL
BASOPHILS # BLD AUTO: 0.05 THOUSANDS/ÂΜL (ref 0–0.1)
BASOPHILS NFR BLD AUTO: 1 % (ref 0–1)
BUN SERPL-MCNC: 8 MG/DL (ref 5–25)
CALCIUM SERPL-MCNC: 8.8 MG/DL (ref 8.3–10.1)
CHLORIDE SERPL-SCNC: 102 MMOL/L (ref 96–108)
CO2 SERPL-SCNC: 28 MMOL/L (ref 21–32)
CREAT SERPL-MCNC: 0.8 MG/DL (ref 0.6–1.3)
EOSINOPHIL # BLD AUTO: 0.22 THOUSAND/ÂΜL (ref 0–0.61)
EOSINOPHIL NFR BLD AUTO: 3 % (ref 0–6)
ERYTHROCYTE [DISTWIDTH] IN BLOOD BY AUTOMATED COUNT: 15 % (ref 11.6–15.1)
GFR SERPL CREATININE-BSD FRML MDRD: 86 ML/MIN/1.73SQ M
GLUCOSE SERPL-MCNC: 164 MG/DL (ref 65–140)
GLUCOSE SERPL-MCNC: 168 MG/DL (ref 65–140)
GLUCOSE SERPL-MCNC: 173 MG/DL (ref 65–140)
GLUCOSE SERPL-MCNC: 204 MG/DL (ref 65–140)
HCT VFR BLD AUTO: 35 % (ref 36.5–49.3)
HGB BLD-MCNC: 11.5 G/DL (ref 12–17)
IMM GRANULOCYTES # BLD AUTO: 0.04 THOUSAND/UL (ref 0–0.2)
IMM GRANULOCYTES NFR BLD AUTO: 1 % (ref 0–2)
LYMPHOCYTES # BLD AUTO: 1.24 THOUSANDS/ÂΜL (ref 0.6–4.47)
LYMPHOCYTES NFR BLD AUTO: 15 % (ref 14–44)
MCH RBC QN AUTO: 29.6 PG (ref 26.8–34.3)
MCHC RBC AUTO-ENTMCNC: 32.9 G/DL (ref 31.4–37.4)
MCV RBC AUTO: 90 FL (ref 82–98)
MONOCYTES # BLD AUTO: 0.71 THOUSAND/ÂΜL (ref 0.17–1.22)
MONOCYTES NFR BLD AUTO: 9 % (ref 4–12)
NEUTROPHILS # BLD AUTO: 5.85 THOUSANDS/ÂΜL (ref 1.85–7.62)
NEUTS SEG NFR BLD AUTO: 71 % (ref 43–75)
NRBC BLD AUTO-RTO: 0 /100 WBCS
PLATELET # BLD AUTO: 174 THOUSANDS/UL (ref 149–390)
PMV BLD AUTO: 10.5 FL (ref 8.9–12.7)
POTASSIUM SERPL-SCNC: 3.4 MMOL/L (ref 3.5–5.3)
RBC # BLD AUTO: 3.89 MILLION/UL (ref 3.88–5.62)
SODIUM SERPL-SCNC: 137 MMOL/L (ref 135–147)
WBC # BLD AUTO: 8.11 THOUSAND/UL (ref 4.31–10.16)

## 2023-02-06 RX ORDER — POTASSIUM CHLORIDE 20 MEQ/1
40 TABLET, EXTENDED RELEASE ORAL ONCE
Status: DISCONTINUED | OUTPATIENT
Start: 2023-02-06 | End: 2023-02-06

## 2023-02-06 RX ADMIN — PANTOPRAZOLE SODIUM 40 MG: 40 TABLET, DELAYED RELEASE ORAL at 08:03

## 2023-02-06 RX ADMIN — METOPROLOL SUCCINATE 100 MG: 50 TABLET, EXTENDED RELEASE ORAL at 08:03

## 2023-02-06 RX ADMIN — VANCOMYCIN HYDROCHLORIDE 1250 MG: 5 INJECTION, POWDER, LYOPHILIZED, FOR SOLUTION INTRAVENOUS at 05:32

## 2023-02-06 RX ADMIN — ENOXAPARIN SODIUM 40 MG: 100 INJECTION SUBCUTANEOUS at 08:02

## 2023-02-06 RX ADMIN — GUAIFENESIN SYRUP AND DEXTROMETHORPHAN 10 ML: 100; 10 SYRUP ORAL at 20:46

## 2023-02-06 RX ADMIN — INSULIN LISPRO 1 UNITS: 100 INJECTION, SOLUTION INTRAVENOUS; SUBCUTANEOUS at 08:01

## 2023-02-06 RX ADMIN — ASPIRIN 81 MG: 81 TABLET, COATED ORAL at 08:03

## 2023-02-06 RX ADMIN — INSULIN LISPRO 1 UNITS: 100 INJECTION, SOLUTION INTRAVENOUS; SUBCUTANEOUS at 11:26

## 2023-02-06 RX ADMIN — CEFEPIME HYDROCHLORIDE 2000 MG: 2 INJECTION, SOLUTION INTRAVENOUS at 16:29

## 2023-02-06 RX ADMIN — CEFEPIME HYDROCHLORIDE 2000 MG: 2 INJECTION, SOLUTION INTRAVENOUS at 04:52

## 2023-02-06 RX ADMIN — Medication 2000 UNITS: at 08:03

## 2023-02-06 RX ADMIN — FERROUS SULFATE TAB 325 MG (65 MG ELEMENTAL FE) 325 MG: 325 (65 FE) TAB at 08:03

## 2023-02-06 RX ADMIN — ATORVASTATIN CALCIUM 40 MG: 40 TABLET, FILM COATED ORAL at 08:03

## 2023-02-06 RX ADMIN — B-COMPLEX W/ C & FOLIC ACID TAB 1 TABLET: TAB at 08:03

## 2023-02-06 RX ADMIN — INSULIN LISPRO 1 UNITS: 100 INJECTION, SOLUTION INTRAVENOUS; SUBCUTANEOUS at 16:29

## 2023-02-06 RX ADMIN — VALACYCLOVIR HYDROCHLORIDE 500 MG: 500 TABLET, FILM COATED ORAL at 08:03

## 2023-02-06 RX ADMIN — GUAIFENESIN SYRUP AND DEXTROMETHORPHAN 10 ML: 100; 10 SYRUP ORAL at 10:40

## 2023-02-06 RX ADMIN — POTASSIUM CHLORIDE 40 MEQ: 1500 TABLET, EXTENDED RELEASE ORAL at 08:03

## 2023-02-06 RX ADMIN — GUAIFENESIN SYRUP AND DEXTROMETHORPHAN 10 ML: 100; 10 SYRUP ORAL at 04:52

## 2023-02-06 NOTE — PROGRESS NOTES
Mika Nolan is a 69 y o  male who is currently ordered Vancomycin IV with management by the Pharmacy Consult service  Relevant clinical data and objective / subjective history reviewed  Vancomycin Assessment:  Indication and Goal AUC/Trough: Other, fever in immunocompromised patient, -600, trough >10  Clinical Status: stable  Micro:     Renal Function:  SCr: 0 8 mg/dL  CrCl: 81 5 mL/min  Renal replacement: Not on dialysis  Days of Therapy: 3  Current Dose: 1250 mg Q24H - will adjust the dose on 02/07 since the troug is scheduled for 02/07 @6 AM labs  Vancomycin Plan:   Dosing:  patient already received a dose and scheduled for AM labs on 02/07 will adjust the dose then     Next Level: @ 6am on 02/07  Renal Function Monitoring: Daily BMP and [place] will continue to follow closely for s/sx of nephrotoxicity, infusion reactions and appropriateness of therapy  BMP and CBC will be ordered per protocol  We will continue to follow the patient’s culture results and clinical progress daily      Karthik Zuniga, Pharmacist

## 2023-02-06 NOTE — PLAN OF CARE
Problem: PAIN - ADULT  Goal: Verbalizes/displays adequate comfort level or baseline comfort level  Description: Interventions:  - Encourage patient to monitor pain and request assistance  - Assess pain using appropriate pain scale  - Administer analgesics based on type and severity of pain and evaluate response  - Implement non-pharmacological measures as appropriate and evaluate response  - Consider cultural and social influences on pain and pain management  - Notify physician/advanced practitioner if interventions unsuccessful or patient reports new pain  Outcome: Progressing     Problem: INFECTION - ADULT  Goal: Absence or prevention of progression during hospitalization  Description: INTERVENTIONS:  - Assess and monitor for signs and symptoms of infection  - Monitor lab/diagnostic results  - Monitor all insertion sites, i e  indwelling lines, tubes, and drains  - Monitor endotracheal if appropriate and nasal secretions for changes in amount and color  - [place] appropriate cooling/warming therapies per order  - Administer medications as ordered  - Instruct and encourage patient and family to use good hand hygiene technique  - Identify and instruct in appropriate isolation precautions for identified infection/condition  Outcome: Progressing  Goal: Absence of fever/infection during neutropenic period  Description: INTERVENTIONS:  - Monitor WBC    Outcome: Progressing     Problem: SAFETY ADULT  Goal: Patient will remain free of falls  Description: INTERVENTIONS:  - Educate patient/family on patient safety including physical limitations  - Instruct patient to call for assistance with activity   - Consult OT/PT to assist with strengthening/mobility   - Keep Call bell within reach  - Keep bed low and locked with side rails adjusted as appropriate  - Keep care items and personal belongings within reach  - Initiate and maintain comfort rounds  - Make Fall Risk Sign visible to staff  - Offer Toileting every 2 Hours, in advance of need  - Initiate/Maintain alarm  - Obtain necessary fall risk management equipment  - Apply yellow socks and bracelet for high fall risk patients  - Consider moving patient to room near nurses station  Outcome: Progressing  Goal: Maintain or return to baseline ADL function  Description: INTERVENTIONS:  -  Assess patient's ability to carry out ADLs; assess patient's baseline for ADL function and identify physical deficits which impact ability to perform ADLs (bathing, care of mouth/teeth, toileting, grooming, dressing, etc )  - Assess/evaluate cause of self-care deficits   - Assess range of motion  - Assess patient's mobility; develop plan if impaired  - Assess patient's need for assistive devices and provide as appropriate  - Encourage maximum independence but intervene and supervise when necessary  - Involve family in performance of ADLs  - Assess for home care needs following discharge   - Consider OT consult to assist with ADL evaluation and planning for discharge  - Provide patient education as appropriate  Outcome: Progressing  Goal: Maintains/Returns to pre admission functional level  Description: INTERVENTIONS:  - Perform BMAT or MOVE assessment daily    - Set and communicate daily mobility goal to care team and patient/family/caregiver  - Collaborate with rehabilitation services on mobility goals if consulted  - Perform Range of Motion 4 times a day  - Reposition patient every 2 hours    - Dangle patient 4 times a day  - Stand patient 4 times a day  - Ambulate patient 4 times a day  - Out of bed to chair 4 times a day   - Out of bed for meals 3 times a day  - Out of bed for toileting  - Record patient progress and toleration of activity level   Outcome: Progressing     Problem: DISCHARGE PLANNING  Goal: Discharge to home or other facility with appropriate resources  Description: INTERVENTIONS:  - Identify barriers to discharge w/patient and caregiver  - Arrange for needed discharge resources and transportation as appropriate  - Identify discharge learning needs (meds, wound care, etc )  - Arrange for interpretive services to assist at discharge as needed  - Refer to Case Management Department for coordinating discharge planning if the patient needs post-hospital services based on physician/advanced practitioner order or complex needs related to functional status, cognitive ability, or social support system  Outcome: Progressing     Problem: Knowledge Deficit  Goal: Patient/family/caregiver demonstrates understanding of disease process, treatment plan, medications, and discharge instructions  Description: Complete learning assessment and assess knowledge base    Interventions:  - Provide teaching at level of understanding  - Provide teaching via preferred learning methods  Outcome: Progressing     Problem: RESPIRATORY - ADULT  Goal: Achieves optimal ventilation and oxygenation  Description: INTERVENTIONS:  - Assess for changes in respiratory status  - Assess for changes in mentation and behavior  - Position to facilitate oxygenation and minimize respiratory effort  - Oxygen administered by appropriate delivery if ordered  - Initiate smoking cessation education as indicated  - Encourage broncho-pulmonary hygiene including cough, deep breathe, Incentive Spirometry  - Assess the need for suctioning and aspirate as needed  - Assess and instruct to report SOB or any respiratory difficulty  - Respiratory Therapy support as indicated  Outcome: Progressing

## 2023-02-06 NOTE — CONSULTS
Consultation - Courtney Michaud 69 y o  male MRN: [id number]    Unit/Bed#: -01 Encounter: [id number]      Assessment/Plan     Assessment: This is a 69y o -year-old male with Type 2 diabetes with hyperglycemia with insulin resistance d/t steroid effect from chemotherapy for MM with no microvascular complications with CAD S/p CABG admitted for Fever from Rhinovirus  Endocrine consulted for diabetes management  BG controlled inpatient, but HbA1C high as OP at 8 9% possible d/t steroid effect with underlying T2DM  Given controlled glycemia inpatient with BG mainly <200, c/w ISS only for now  On discharge recommend increasing metformin too 1000mg BID, check BG 2-3x daily and follow up with endocrine as OP- discussed can consider COLLINS or DPP4 use as OP if needed for persistent hyperglycemia  Plan:  ISS only     Discharge   - Metformin 1000mg BID  - Check BG 2-3x daily    Inpatient consult to Endocrinology  Consult performed by: Maria Luz Celis MD  Consult ordered by: Rohan Bang MD          CC: T2DM management    History of Present Illness     HPI: Courtney Michaud is a 69y o  year old male with type 2 DM for 5-6 years well controlled previously with Metformin only with no microvascular complication, with macrovascular complication of CABG with recent diagnosis of MM on chemotherapy through [facility] on Dexamethasone every 2 weeks who was admitted for fever possible from Rhinovirus infection  Endocrine consulted per family request d/t his outpatient Oncologist had suggested endocrine follow up  Patient reports his diabetes was well controlled for years while on metformin only, however since starting chemotherapy and steroids, he started noticing BG in 250-300 range few days after his chemotherapy  He has recent HbA1C checked PTA which was now 8 9% and therefore PCP increased his metformin from 500mg daily to BID   Patient reports BG does improve to 150-160 range the week prior to chemotherapy but raises to 250-300 range the week after chemotherapy  Denies any polyuria, polydipsia, blurry vision, Does have some numbness in feet but only at night  Currently feels well, afebrile since admission, eating all his meals  BG since admission highest 204, otherwise BG in range    Review of Systems   Constitutional: Negative for diaphoresis, fatigue and unexpected weight change  Respiratory: Negative for shortness of breath  Cardiovascular: Negative for chest pain and palpitations  Gastrointestinal: Negative for constipation and diarrhea  Endocrine: Negative for polydipsia and polyuria         Historical Information   Past Medical History:   Diagnosis Date   • [facility] ) 4/20/2022   • Cataract    • Colitis    • Colon polyp    • Fatty liver    • GERD (gastroesophageal reflux disease)    • History of chemotherapy    • History of radiation therapy    • History of shingles 2018   • History of transfusion    • Hyperlipidemia    • 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[facility] )      Past Surgical History:   Procedure Laterality Date   • AORTIC VALVE REPLACEMENT     • CARDIAC VALVE REPLACEMENT  2014    aorta   • CATARACT EXTRACTION Bilateral    • COLECTOMY     • COLONOSCOPY  11/2019    Prior right hemicolectomy   • CORONARY ARTERY BYPASS GRAFT     • DENTAL SURGERY     • JOINT REPLACEMENT  January 2019    left knee   • KNEE SURGERY     • LYMPH NODE BIOPSY  2003   • LYMPHADENECTOMY     • OTHER SURGICAL HISTORY      MAZE PROCEDURE   • WV ARTHRP KNE CONDYLE&PLATU MEDIAL&LAT COMPARTMENTS Left 01/28/2019    Procedure: ARTHROPLASTY LEFT KNEE TOTAL;  Surgeon: Farheen Gonzales MD;  Location:  MAIN OR;  Service: Orthopedics   • WV LARYNGOSCOPY DIRECT OPERATIVE W/BIOPSY Right 04/27/2022    Procedure: DIRECT LARYNGOSCOPY WITH BIOPSY RIGHT PHARYNX, POSSIBLE RIGHT NECK LYMPH NODE BIOPSY; FROZEN SECTION;  Surgeon: Yariel Cohen MD;  Location:  MAIN OR;  Service: ENT   • SKIN BIOPSY     • UPPER GASTROINTESTINAL ENDOSCOPY  2019    Schatzki ring   • US GUIDED LYMPH NODE BIOPSY RIGHT  2021     Social History   Social History     Substance and Sexual Activity   Alcohol Use Yes   • Alcohol/week: 2 0 standard drinks   • Types: 2 Cans of beer per week    Comment: very rare "beer here and there"     Social History     Substance and Sexual Activity   Drug Use No     Social History     Tobacco Use   Smoking Status Former   • Packs/day: 1 00   • Years: 40 00   • Pack years: 40 00   • Types: Cigarettes   • Start date: 47   • Quit date: 2009   • Years since quittin 1   Smokeless Tobacco Never     Family History:   Family History   Problem Relation Age of Onset   • Heart block Family    • Coronary artery disease Mother    • Thrombosis Mother    • Hypertension Mother    • Arthritis Mother    • Hyperlipidemia Mother    • Diabetes Father    • Hypertension Father    • Arthritis Father    • Sudden death Father         cardiac   • Colon cancer Paternal Grandfather    • Cancer Paternal Grandfather    • Breast cancer Sister    • Heart disease Brother         Coronary stents       Meds/Allergies   Current Facility-Administered Medications   Medication Dose Route Frequency Provider Last Rate Last Admin   • aluminum-magnesium hydroxide-simethicone (MYLANTA) oral suspension 30 mL  30 mL Oral Q6H PRN Oswaldo Esparza PA-C       • aspirin (ECOTRIN LOW STRENGTH) EC tablet 81 mg  81 mg Oral Daily Oswaldo Esparza PA-C   81 mg at 23 6648   • atorvastatin (LIPITOR) tablet 40 mg  40 mg Oral Daily Oswaldo Esparza PA-C   40 mg at 23 6689   • cefepime (MAXIPIME) IVPB (premix in dextrose) 2,000 mg 50 mL  2,000 mg Intravenous Q12H Oswaldo Esparza PA-C 100 mL/hr at 23 0452 2,000 mg at 23 0452   • cholecalciferol (VITAMIN D3) tablet 2,000 Units  2,000 Units Oral Daily Oswaldo Esparza PA-C   2,000 Units at 23 0803   • dextromethorphan-guaiFENesin (ROBITUSSIN DM) oral syrup 10 mL  10 mL Oral Q4H PRN Jerrald Cart, PA-C   10 mL at 02/06/23 1040   • enoxaparin (LOVENOX) subcutaneous injection 40 mg  40 mg Subcutaneous Daily Jerrald Cart, PA-C   40 mg at 02/06/23 0802   • ferrous sulfate tablet 325 mg  325 mg Oral Daily With Breakfast Jerrald Cart, PA-C   325 mg at 02/06/23 3988   • insulin lispro (HumaLOG) 100 units/mL subcutaneous injection 1-5 Units  1-5 Units Subcutaneous TID [facility] Edge MD   1 Units at 02/06/23 1126   • metoprolol succinate (TOPROL-XL) 24 hr tablet 100 mg  100 mg Oral Daily Jerrald Cart, PA-C   100 mg at 02/06/23 6908   • multivitamin stress formula tablet 1 tablet  1 tablet Oral Daily Jerrald Cart, PA-C   1 tablet at 02/06/23 8559   • pantoprazole (PROTONIX) EC tablet 40 mg  40 mg Oral Early Morning Jerrald Cart, PA-C   40 mg at 02/06/23 6075   • valACYclovir (VALTREX) tablet 500 mg  500 mg Oral Daily Jerrald Cart, PA-C   500 mg at 02/06/23 3877   • vancomycin (VANCOCIN) 1250 mg in sodium chloride 0 9% 250 mL IVPB  1,250 mg Intravenous Q24H Jerrald Cart, PA-C 0 mL/hr at 02/05/23 0852 1,250 mg at 02/06/23 0532     Allergies   Allergen Reactions   • Ceftin [Cefuroxime] Itching     However, has tolerated Cefazolin and Pip-Tazo since, which have different side chains  Be cautious with / avoid 2nd, 3rd, or 4th Gen Cephs that have similar side chains to Cefuroxime  • Lantus [Insulin Glargine] Itching       Objective   Vitals: Blood pressure 170/79, pulse 78, temperature 98 2 °F (36 8 °C), temperature source Oral, resp  rate 16, height 5' 11" (1 803 m), weight 73 5 kg (162 lb), SpO2 95 %  Intake/Output Summary (Last 24 hours) at 2/6/2023 1517  Last data filed at 2/6/2023 0806  Gross per 24 hour   Intake 540 ml   Output 2475 ml   Net -1935 ml     Invasive Devices     Peripheral Intravenous Line  Duration           Peripheral IV 02/04/23 Distal;Dorsal (posterior); Right Forearm 2 days    Peripheral IV 02/04/23 Right Antecubital 1 day Physical Exam  Constitutional:       Appearance: Normal appearance  Cardiovascular:      Rate and Rhythm: Normal rate and regular rhythm  Pulses: Normal pulses  Pulmonary:      Effort: Pulmonary effort is normal    Abdominal:      General: Abdomen is flat  Palpations: Abdomen is soft  Skin:     General: Skin is warm  Capillary Refill: Capillary refill takes less than 2 seconds  Neurological:      General: No focal deficit present  Mental Status: He is alert  The history was obtained from the review of the chart, patient  Lab Results:   Results from last 7 days   Lab Units 02/01/23  0849   HEMOGLOBIN A1C % 8 9*     Lab Results   Component Value Date    WBC 8 11 02/06/2023    HGB 11 5 (L) 02/06/2023    HCT 35 0 (L) 02/06/2023    MCV 90 02/06/2023     02/06/2023     Lab Results   Component Value Date/Time    BUN 8 02/06/2023 04:46 AM    BUN 13 02/01/2023 08:49 AM     05/19/2017 07:20 AM    K 3 4 (L) 02/06/2023 04:46 AM    K 3 9 02/01/2023 08:49 AM     02/06/2023 04:46 AM     02/01/2023 08:49 AM    CO2 28 02/06/2023 04:46 AM    CO2 29 02/01/2023 08:49 AM    CREATININE 0 80 02/06/2023 04:46 AM    CREATININE 0 93 05/19/2017 07:20 AM    AST 15 02/01/2023 08:49 AM    ALT 9 02/01/2023 08:49 AM    ALB 4 0 02/01/2023 08:49 AM    ALB 2 6 (L) 12/08/2022 01:53 AM    ALB 4 3 05/19/2017 07:20 AM    GLOB 2 3 02/01/2023 08:49 AM     No results for input(s): CHOL, HDL, LDL, TRIG, VLDL in the last 72 hours  No results found for: Janet Ortiz  POC Glucose (mg/dl)   Date Value   02/06/2023 204 (H)   02/06/2023 164 (H)       Imaging Studies: I have personally reviewed pertinent reports  Portions of the record may have been created with voice recognition software

## 2023-02-06 NOTE — ASSESSMENT & PLAN NOTE
Lab Results   Component Value Date    HGBA1C 8 9 (H) 02/01/2023       Recent Labs     02/05/23  1232 02/05/23  1620 02/06/23  0719 02/06/23  1124   POCGLU 201* 174* 164* 204*       Blood Sugar Average: Last 72 hrs:  (P) 185  75Home regimen: Metformin 1000 mg twice daily  Sugars continue to be elevated, secondary to dexamethasone use every Thursday and with immunomodulating therapy  His oncologist wants him to see an endocrinologist  Endocrinology consult pending  Last HBA1c done 2/1/23 was 8 9  Sliding scale insulin  Diabetic diet

## 2023-02-06 NOTE — PLAN OF CARE
Problem: PAIN - ADULT  Goal: Verbalizes/displays adequate comfort level or baseline comfort level  Description: Interventions:  - Encourage patient to monitor pain and request assistance  - Assess pain using appropriate pain scale  - Administer analgesics based on type and severity of pain and evaluate response  - Implement non-pharmacological measures as appropriate and evaluate response  - Consider cultural and social influences on pain and pain management  - Notify physician/advanced practitioner if interventions unsuccessful or patient reports new pain  Outcome: Progressing     Problem: INFECTION - ADULT  Goal: Absence or prevention of progression during hospitalization  Description: INTERVENTIONS:  - Assess and monitor for signs and symptoms of infection  - Monitor lab/diagnostic results  - Monitor all insertion sites, i e  indwelling lines, tubes, and drains  - Monitor endotracheal if appropriate and nasal secretions for changes in amount and color  - [place] appropriate cooling/warming therapies per order  - Administer medications as ordered  - Instruct and encourage patient and family to use good hand hygiene technique  - Identify and instruct in appropriate isolation precautions for identified infection/condition  Outcome: Progressing  Goal: Absence of fever/infection during neutropenic period  Description: INTERVENTIONS:  - Monitor WBC    Outcome: Progressing     Problem: SAFETY ADULT  Goal: Patient will remain free of falls  Description: INTERVENTIONS:  - Educate patient/family on patient safety including physical limitations  - Instruct patient to call for assistance with activity   - Consult OT/PT to assist with strengthening/mobility   - Keep Call bell within reach  - Keep bed low and locked with side rails adjusted as appropriate  - Keep care items and personal belongings within reach  - Initiate and maintain comfort rounds  - Make Fall Risk Sign visible to staff  - Apply yellow socks and bracelet for high fall risk patients  - Consider moving patient to room near nurses station  Outcome: Progressing  Goal: Maintain or return to baseline ADL function  Description: INTERVENTIONS:  -  Assess patient's ability to carry out ADLs; assess patient's baseline for ADL function and identify physical deficits which impact ability to perform ADLs (bathing, care of mouth/teeth, toileting, grooming, dressing, etc )  - Assess/evaluate cause of self-care deficits   - Assess range of motion  - Assess patient's mobility; develop plan if impaired  - Assess patient's need for assistive devices and provide as appropriate  - Encourage maximum independence but intervene and supervise when necessary  - Involve family in performance of ADLs  - Assess for home care needs following discharge   - Consider OT consult to assist with ADL evaluation and planning for discharge  - Provide patient education as appropriate  Outcome: Progressing

## 2023-02-06 NOTE — PROGRESS NOTES
[place]  Progress Note - Jenn Morel 1945, 69 y o  male MRN: [id number]  Unit/Bed#: -01 Encounter: [id number]  Primary Care Provider: Patric Kern DO   Date and time admitted to hospital: 2/4/2023  2:09 PM    Sepsis [facility])  Assessment & Plan  SIRS: Fever, WBC elevated on presentation but on steroids  Related to rhinovirus likely  Blood culture negative in 24 hours, urine culture pending  Continue antibiotics     Hypokalemia  Assessment & Plan  Increase repletions  BMP closely  Hold furosemide with decreasing potassium  Chronic diastolic congestive heart failure (HCC)  Assessment & Plan  Wt Readings from Last 3 Encounters:   02/04/23 73 5 kg (162 lb)   01/28/23 76 7 kg (169 lb)   12/09/22 75 2 kg (165 lb 12 6 oz)     Not in acute exacerbation  EF 61%, grade 2 diastolic dysfunction  Hold diuretic as above  Patient follows up with cardiology    Essential hypertension  Assessment & Plan  Continue metoprolol and Lasix held as above    Hx of CABG  Assessment & Plan  Continue ASA, statin, metoprolol    Multiple myeloma not having achieved remission [facility])  Assessment & Plan  Follows with [facility]'s oncologist  On daratumumab and Velcade as well as dexamethasone, received his treatment last Thursday    Type 2 diabetes mellitus without complication, without long-term current use of insulin [facility])  Assessment & Plan  Lab Results   Component Value Date    HGBA1C 8 9 (H) 02/01/2023       Recent Labs     02/05/23  1232 02/05/23  1620 02/06/23  0719 02/06/23  1124   POCGLU 201* 174* 164* 204*       Blood Sugar Average: Last 72 hrs:  (P) 185  75Home regimen: Metformin 1000 mg twice daily  Sugars continue to be elevated, secondary to dexamethasone use every Thursday and with immunomodulating therapy  His oncologist wants him to see an endocrinologist  Endocrinology consult pending  Last HBA1c done 2/1/23 was 8 9  Sliding scale insulin  Diabetic diet    * Fever  Assessment & Plan  · Patient presents due to elevated fevers  100 5 at home, 101 5 at urgent care  He denies feeling feverish  · (+) cough, minimally productive for about 9 days  · Patient was rhinovirus/enterovirus positive  · Received his immunotherapy on Thursday  · WBC 12, meeting sepsis criteria  · CXR concerning for possible left lower lobe pneumonia  · No cellulitis  No diarrhea/nausea/vomiting to suspect GI infection  · UA no clear evidence for infection  · Procalcitonin negative, lactic WNL  · Started on cefepime/Vanco  · Blood culture negative in 24 hours  · Urine culture pending          VTE Pharmacologic Prophylaxis: VTE Score: 7 Moderate Risk (Score 3-4) - Pharmacological DVT Prophylaxis Ordered: enoxaparin (Lovenox)  Education and Discussions with Family / Patient: Updated  (wife) at bedside  Time Spent for Care: 70 minutes  More than 50% of total time spent on counseling and coordination of care as described above  Current Length of Stay: 2 day(s)  Current Patient Status: Inpatient   Certification Statement: The patient will continue to require additional inpatient hospital stay due to Rhinovirus/enterovirus in patient with pneumonia  Discharge Plan: Anticipate discharge tomorrow to home  Code Status: Level 1 - Full Code    Subjective:   No fresh complaint    Objective:   Patient looks well clinically    Vitals:   Temp (24hrs), Av 1 °F (37 3 °C), Min:98 2 °F (36 8 °C), Max:99 9 °F (37 7 °C)    Temp:  [98 2 °F (36 8 °C)-99 9 °F (37 7 °C)] 98 2 °F (36 8 °C)  HR:  [78-85] 78  Resp:  [16] 16  BP: (166-170)/(68-79) 170/79  SpO2:  [95 %] 95 %  Body mass index is 22 59 kg/m²  Input and Output Summary (last 24 hours): Intake/Output Summary (Last 24 hours) at 2023 1431  Last data filed at 2023 0806  Gross per 24 hour   Intake 540 ml   Output 2475 ml   Net -1935 ml       Physical Exam:   Physical Exam  Vitals and nursing note reviewed     Constitutional:       General: He is not in acute distress  Appearance: He is well-developed  HENT:      Head: Normocephalic and atraumatic  Eyes:      Conjunctiva/sclera: Conjunctivae normal    Cardiovascular:      Rate and Rhythm: Normal rate and regular rhythm  Heart sounds: No murmur heard  Pulmonary:      Effort: Pulmonary effort is normal  No respiratory distress  Breath sounds: Rales (Left lung base) present  Abdominal:      Palpations: Abdomen is soft  Tenderness: There is no abdominal tenderness  Musculoskeletal:         General: No swelling  Cervical back: Neck supple  Skin:     General: Skin is warm  Capillary Refill: Capillary refill takes less than 2 seconds  Neurological:      General: No focal deficit present  Mental Status: He is alert and oriented to person, place, and time  Psychiatric:         Mood and Affect: Mood normal             Additional Data:     Labs:  Results from last 7 days   Lab Units 02/06/23  0446   WBC Thousand/uL 8 11   HEMOGLOBIN g/dL 11 5*   HEMATOCRIT % 35 0*   PLATELETS Thousands/uL 174   NEUTROS PCT % 71   LYMPHS PCT % 15   MONOS PCT % 9   EOS PCT % 3     Results from last 7 days   Lab Units 02/06/23  0446 02/04/23  1450 02/01/23  0849   SODIUM mmol/L 137   < > 143   POTASSIUM mmol/L 3 4*   < > 3 9   CHLORIDE mmol/L 102   < > 101   CO2 mmol/L 28   < > 29   BUN mg/dL 8   < > 13   CREATININE mg/dL 0 80   < > 0 93   ANION GAP mmol/L 7   < >  --    CALCIUM mg/dL 8 8   < >  --    ALBUMIN g/dL  --   --  4 0   TOTAL BILIRUBIN mg/dL  --   --  0 4   ALT IU/L  --   --  9   AST IU/L  --   --  15   GLUCOSE RANDOM mg/dL 168*   < > 151*    < > = values in this interval not displayed           Results from last 7 days   Lab Units 02/06/23  1124 02/06/23  0719 02/05/23  1620 02/05/23  1232   POC GLUCOSE mg/dl 204* 164* 174* 201*     Results from last 7 days   Lab Units 02/01/23  0849   HEMOGLOBIN A1C % 8 9*     Results from last 7 days   Lab Units 02/05/23  0446 02/04/23  1450 LACTIC ACID mmol/L  --  1 2   PROCALCITONIN ng/ml 0 13 0 17       Lines/Drains:  Invasive Devices     Peripheral Intravenous Line  Duration           Peripheral IV 02/04/23 Distal;Dorsal (posterior); Right Forearm 1 day    Peripheral IV 02/04/23 Right Antecubital 1 day                    Recent Cultures (last 7 days):   Results from last 7 days   Lab Units 02/04/23  1751 02/04/23  1501 02/04/23  1450   BLOOD CULTURE   --  No Growth at 24 hrs  No Growth at 24 hrs  URINE CULTURE  No Growth <1000 cfu/mL  --   --        Last 24 Hours Medication List:   Current Facility-Administered Medications   Medication Dose Route Frequency Provider Last Rate   • aluminum-magnesium hydroxide-simethicone  30 mL Oral Q6H PRN Mildermarv Javier PA-C     • aspirin  81 mg Oral Daily Mildermarv Javier PA-C     • atorvastatin  40 mg Oral Daily Milderd AWA Javier     • cefepime  2,000 mg [facility]venous [facility] mg (02/06/23 0452)   • cholecalciferol  2,000 Units Oral Daily Mildermarv Javier PA-C     • dextromethorphan-guaiFENesin  10 mL Oral Q4H PRN Mildermarv Javier PA-C     • enoxaparin  40 mg Subcutaneous Daily Mildermarv Javier PA-C     • ferrous sulfate  325 mg Oral Daily With [address], PA-C     • insulin lispro  1-5 Units Subcutaneous TID AC Morena Burnette MD     • metoprolol succinate  100 mg Oral Daily Milderd AWA Javier     • multivitamin stress formula  1 tablet Oral Daily Mildermarv Javier PA-C     • pantoprazole  40 mg Oral Early Morning Mildermarv Javier PA-C     • valACYclovir  500 mg Oral Daily Milderd Filemon Javier     • vancomycin  1,250 mg Intravenous Q24H Mildermarv Javier PA-C 0 mg (02/05/23 1423)        Today, Patient Was Seen By: Yelena Chicas MD    **Please Note: This note may have been constructed using a voice recognition system  **

## 2023-02-06 NOTE — CASE MANAGEMENT
Case Management Assessment & Discharge Planning Note    Patient name Dominic Doshi  Location [address] 220/-01 MRN [id number]  : 1945 Date 2023       Current Admission Date: 2023  Current Admission Diagnosis:Fever   Patient Active Problem List    Diagnosis Date Noted   • Fever 2023   • Sepsis ([facility] [address] ) 2023   • CVID (common variable immunodeficiency) ([facility] ) 2022   • Glossitis 2022   • Chest pain 2022   • Mouth ulcers 2021   • Iron deficiency anemia 2021   • Hypokalemia 04/15/2021   • Gastroesophageal reflux disease with esophagitis 2021   • Roberts's esophagus without dysplasia 2019   • Schatzki's ring of distal esophagus 2019   • Pharyngeal dysphagia 10/11/2019   • Personal history of colon cancer 10/11/2019   • Paroxysmal atrial fibrillation ([facility] )    • Leukocytosis 2019   • Chronic diastolic congestive heart failure ([facility] ) 2019   • History of aortic valve replacement with bioprosthetic valve 2019   • S/P total knee arthroplasty, left 2019   • Essential hypertension 2018   • Hx of CABG    • Primary localized osteoarthritis of right knee 2018   • Multiple myeloma not having achieved remission ([facility] ) 2018   • Basal cell carcinoma of skin 2018   • Erectile dysfunction 2017   • Coronary artery disease involving native heart with angina pectoris, unspecified vessel or lesion type ([facility] ) 10/07/2014   • Malignant tumor of cecum ([facility] ) 2013   • Type 2 diabetes mellitus without complication, without long-term current use of insulin ([facility] ) 2012   • Fatty liver 2012   • Lymphoma ([facility] ) 2012   • Dyslipidemia 2012      LOS (days): 2  Geometric Mean LOS (GMLOS) (days): 5 00  Days to GMLOS:3 2     OBJECTIVE:    Risk of Unplanned Readmission Score: 22 94         Current admission status: Inpatient       [facility] Pharmacy:   [id number] [place] [id number]  Phone: [phone] Fax: [phone]    Primary Care Provider: Guanakito Hull DO    Primary Insurance: Amalia García [facility]  Secondary Insurance:     ASSESSMENT:  [address]     Armin Anthony 89 - Spouse   Primary Phone: [phone] (Mobile)  Home Phone: [phone]               Advance Directives  Does patient have a [address]?: Yes  Does patient have Advance Directives?: Yes  Advance Directives: Living will, Power of  for health care    Readmission Root Cause  30 Day Readmission: No    Patient Information  Admitted from[de-identified] Home  Mental Status: Alert  During Assessment patient was accompanied by: Spouse  Assessment information provided by[de-identified] Patient  Primary Caregiver: Self  Support Systems: Spouse/significant other  [place] of Residence: [address] [facility] do you live in?: [address] entry access options  Select all that apply : Stairs  Number of steps to enter home  : 1  Do the steps have railings?: No  Type of Current Residence: [address] [place] home  Upon entering residence, is there a bedroom on the main floor (no further steps)?: Yes  Upon entering residence, is there a bathroom on the main floor (no further steps)?: Yes  In the last 12 months, was there a time when you were not able to pay the mortgage or rent on time?: No  In the last 12 months, how many places have you lived?: 1  In the last 12 months, was there a time when you did not have a steady place to sleep or slept in a shelter (including now)?: No  Homeless/housing insecurity resource given?: N/A  Living Arrangements: Lives w/ Spouse/significant other    Activities of Daily Living Prior to Admission  Functional Status: Independent  Completes ADLs independently?: Yes  Ambulates independently?: Yes  Does patient use assisted devices?: No  Does patient currently own DME?: Yes  What DME does the patient currently own?: Verito Mcdaniel, [employer], Maria Guadalupe Ayala  Does patient have a history of Outpatient Therapy (PT/OT)?: No  Does the patient have a history of Short-Term Rehab?: No  Does patient have a history of HHC?: Yes (hx [facility])  Does patient currently have [address]?: No    Patient Information Continued  Does patient have prescription coverage?: Yes  Within the past 12 months, you worried that your food would run out before you got the money to buy more : Never true  Within the past 12 months, the food you bought just didn't last and you didn't have money to get more : Never true  Food insecurity resource given?: N/A  Does patient receive dialysis treatments?: No  Does patient have a history of substance abuse?: No  Does patient have a history of Mental Health Diagnosis?: No    Means of Transportation  Means of Transport to Appts[de-identified] Drives Self  In the past 12 months, has lack of transportation kept you from medical appointments or from getting medications?: No  In the past 12 months, has lack of transportation kept you from meetings, work, or from getting things needed for daily living?: No  Was application for public transport provided?: N/A    DISCHARGE DETAILS:    Discharge planning discussed with[de-identified] patient and wife  Freedom of Choice: Yes  Comments - Freedom of Choice: No anticipated dc needs  CM contacted family/caregiver?: Yes  Were Treatment Team discharge recommendations reviewed with patient/caregiver?: Yes  Did patient/caregiver verbalize understanding of patient care needs?: Yes  Were patient/caregiver advised of the risks associated with not following Treatment Team discharge recommendations?: Yes    Contacts  Patient Contacts: Tom Swan  Relationship to Patient[de-identified] Family  Contact Method:  In Person  Reason/Outcome: Discharge [address]         Is the patient interested in [address] at discharge?: No    DME Referral Provided  Referral made for DME?: No    Treatment Team Recommendation: Home  Discharge Destination Plan[de-identified] Home  Transport at Discharge : Family        IMM Given (Date):: 02/06/23  IMM Given to[de-identified] Patient     Additional Comments: Met with pt to discuss the role of CM and to discuss any help pt may need prior to dc  Pt lives with his wife and sister in-law in a 2 story home with [address]  Pt performed ADL's indptly pta, no use of DME  Pt has a RW, WC, and care  Pt has a hx of [facility]  No hx or rehab  No hx of mental health or D&A treatment  [facility] [address]  Pt drives  Pt's wife will transport home at dc

## 2023-02-06 NOTE — ASSESSMENT & PLAN NOTE
Wt Readings from Last 3 Encounters:   02/04/23 73 5 kg (162 lb)   01/28/23 76 7 kg (169 lb)   12/09/22 75 2 kg (165 lb 12 6 oz)     Not in acute exacerbation  EF 17%, grade 2 diastolic dysfunction  Hold diuretic as above    Patient follows up with cardiology

## 2023-02-06 NOTE — ASSESSMENT & PLAN NOTE
SIRS: Fever, WBC elevated on presentation but on steroids  Related to rhinovirus likely     Blood culture negative in 24 hours, urine culture pending  Continue antibiotics

## 2023-02-06 NOTE — ASSESSMENT & PLAN NOTE
· Patient presents due to elevated fevers  100 5 at home, 101 5 at urgent care  He denies feeling feverish  · (+) cough, minimally productive for about 9 days  · Patient was rhinovirus/enterovirus positive  · Received his immunotherapy on Thursday  · WBC 12, meeting sepsis criteria  · CXR concerning for possible left lower lobe pneumonia  · No cellulitis  No diarrhea/nausea/vomiting to suspect GI infection  · UA no clear evidence for infection    · Procalcitonin negative, lactic WNL  · Started on cefepime/Vanco  · Blood culture negative in 24 hours  · Urine culture pending

## 2023-02-07 ENCOUNTER — TRANSITIONAL CARE MANAGEMENT (OUTPATIENT)
Dept: FAMILY MEDICINE CLINIC | Facility: HOSPITAL | Age: 78
End: 2023-02-07

## 2023-02-07 VITALS
OXYGEN SATURATION: 94 % | SYSTOLIC BLOOD PRESSURE: 133 MMHG | TEMPERATURE: 98.2 F | DIASTOLIC BLOOD PRESSURE: 64 MMHG | BODY MASS INDEX: 22.68 KG/M2 | WEIGHT: 162 LBS | HEIGHT: 71 IN | RESPIRATION RATE: 18 BRPM | HEART RATE: 84 BPM

## 2023-02-07 LAB
ANION GAP SERPL CALCULATED.3IONS-SCNC: 7 MMOL/L (ref 4–13)
BASOPHILS # BLD AUTO: 0.06 THOUSANDS/ÂΜL (ref 0–0.1)
BASOPHILS NFR BLD AUTO: 1 % (ref 0–1)
BUN SERPL-MCNC: 8 MG/DL (ref 5–25)
CALCIUM SERPL-MCNC: 8.9 MG/DL (ref 8.3–10.1)
CHLORIDE SERPL-SCNC: 100 MMOL/L (ref 96–108)
CO2 SERPL-SCNC: 29 MMOL/L (ref 21–32)
CREAT SERPL-MCNC: 0.88 MG/DL (ref 0.6–1.3)
EOSINOPHIL # BLD AUTO: 0.25 THOUSAND/ÂΜL (ref 0–0.61)
EOSINOPHIL NFR BLD AUTO: 4 % (ref 0–6)
ERYTHROCYTE [DISTWIDTH] IN BLOOD BY AUTOMATED COUNT: 14.7 % (ref 11.6–15.1)
GFR SERPL CREATININE-BSD FRML MDRD: 83 ML/MIN/1.73SQ M
GLUCOSE SERPL-MCNC: 179 MG/DL (ref 65–140)
GLUCOSE SERPL-MCNC: 182 MG/DL (ref 65–140)
HCT VFR BLD AUTO: 35.3 % (ref 36.5–49.3)
HGB BLD-MCNC: 11.4 G/DL (ref 12–17)
IMM GRANULOCYTES # BLD AUTO: 0.03 THOUSAND/UL (ref 0–0.2)
IMM GRANULOCYTES NFR BLD AUTO: 0 % (ref 0–2)
LYMPHOCYTES # BLD AUTO: 1 THOUSANDS/ÂΜL (ref 0.6–4.47)
LYMPHOCYTES NFR BLD AUTO: 15 % (ref 14–44)
MCH RBC QN AUTO: 28.6 PG (ref 26.8–34.3)
MCHC RBC AUTO-ENTMCNC: 32.3 G/DL (ref 31.4–37.4)
MCV RBC AUTO: 89 FL (ref 82–98)
MONOCYTES # BLD AUTO: 0.66 THOUSAND/ÂΜL (ref 0.17–1.22)
MONOCYTES NFR BLD AUTO: 10 % (ref 4–12)
NEUTROPHILS # BLD AUTO: 4.75 THOUSANDS/ÂΜL (ref 1.85–7.62)
NEUTS SEG NFR BLD AUTO: 70 % (ref 43–75)
NRBC BLD AUTO-RTO: 0 /100 WBCS
PLATELET # BLD AUTO: 184 THOUSANDS/UL (ref 149–390)
PMV BLD AUTO: 10.4 FL (ref 8.9–12.7)
POTASSIUM SERPL-SCNC: 3.8 MMOL/L (ref 3.5–5.3)
RBC # BLD AUTO: 3.98 MILLION/UL (ref 3.88–5.62)
SODIUM SERPL-SCNC: 136 MMOL/L (ref 135–147)
VANCOMYCIN SERPL-MCNC: 6.7 UG/ML (ref 10–20)
WBC # BLD AUTO: 6.75 THOUSAND/UL (ref 4.31–10.16)

## 2023-02-07 RX ORDER — METFORMIN HYDROCHLORIDE 500 MG/1
1000 TABLET, EXTENDED RELEASE ORAL 2 TIMES DAILY WITH MEALS
Qty: 120 TABLET | Refills: 0 | Status: SHIPPED | OUTPATIENT
Start: 2023-02-07

## 2023-02-07 RX ADMIN — GUAIFENESIN SYRUP AND DEXTROMETHORPHAN 10 ML: 100; 10 SYRUP ORAL at 04:27

## 2023-02-07 RX ADMIN — B-COMPLEX W/ C & FOLIC ACID TAB 1 TABLET: TAB at 09:09

## 2023-02-07 RX ADMIN — INSULIN LISPRO 1 UNITS: 100 INJECTION, SOLUTION INTRAVENOUS; SUBCUTANEOUS at 09:00

## 2023-02-07 RX ADMIN — VANCOMYCIN HYDROCHLORIDE 1250 MG: 5 INJECTION, POWDER, LYOPHILIZED, FOR SOLUTION INTRAVENOUS at 06:19

## 2023-02-07 RX ADMIN — ASPIRIN 81 MG: 81 TABLET, COATED ORAL at 09:08

## 2023-02-07 RX ADMIN — ATORVASTATIN CALCIUM 40 MG: 40 TABLET, FILM COATED ORAL at 09:09

## 2023-02-07 RX ADMIN — Medication 2000 UNITS: at 09:08

## 2023-02-07 RX ADMIN — METOPROLOL SUCCINATE 100 MG: 50 TABLET, EXTENDED RELEASE ORAL at 09:08

## 2023-02-07 RX ADMIN — VALACYCLOVIR HYDROCHLORIDE 500 MG: 500 TABLET, FILM COATED ORAL at 09:09

## 2023-02-07 RX ADMIN — PANTOPRAZOLE SODIUM 40 MG: 40 TABLET, DELAYED RELEASE ORAL at 09:17

## 2023-02-07 RX ADMIN — CEFEPIME HYDROCHLORIDE 2000 MG: 2 INJECTION, SOLUTION INTRAVENOUS at 04:27

## 2023-02-07 RX ADMIN — FERROUS SULFATE TAB 325 MG (65 MG ELEMENTAL FE) 325 MG: 325 (65 FE) TAB at 09:08

## 2023-02-07 NOTE — DISCHARGE INSTR - AVS FIRST PAGE
You were diagnosed with Rhinovirus, which is the virus responsible for the "common cold "  Your chest x-ray showed evidence of pneumonia, which is likely viral   Recommend a repeat chest x-ray in about 6 weeks  This can be ordered by primary care  Please increase Metformin to 1,000 mg twice daily  Check your blood sugars 2-3 times/day  Please follow up with your primary care provider within one week of discharge

## 2023-02-07 NOTE — ASSESSMENT & PLAN NOTE
· Follows with VASU VEGA oncologist  · On daratumumab and Velcade as well as dexamethasone, received his treatment last Thursday

## 2023-02-07 NOTE — ASSESSMENT & PLAN NOTE
· Patient presents due to elevated fevers  100 5 at home, 101 5 at urgent care  He denies feeling feverish  · (+) cough, minimally productive for about 9 days  · Patient was rhinovirus/enterovirus positive  · Received his immunotherapy on Thursday  · WBC 12, meeting sepsis criteria  · CXR concerning for possible left lower lobe pneumonia  · Pt on room air, denies SOB, +cough  Likely viral related  Monitor off further abx  Repeat CXR in about 6 weeks  · No cellulitis  No diarrhea/nausea/vomiting to suspect GI infection  · UA no clear evidence for infection and urine culture negative   · Procalcitonin negative, lactic WNL  · BC x 2 negative at 48 hrs  · Started on cefepime/Vanco empirically -- now discontinued as likely all viral related with no evidence of bacterial infection    Has been afebrile x 48 hrs at least   Stable for discharge

## 2023-02-07 NOTE — ASSESSMENT & PLAN NOTE
· POA with fever, leukocytosis (but on steroids)  · Related to rhinovirus likely  · Blood culture negative at 48 hrs, urine culture negative  · Procalcitonin negative   · Empiric antibiotics discontinued  Due to viral infection with rhinovirus

## 2023-02-07 NOTE — DISCHARGE SUMMARY
[place]  Discharge- Neena Ax 1945, 69 y o  male MRN: [id number]  Unit/Bed#: -01 Encounter: [id number]  Primary Care Provider: Nathan Crain DO   Date and time admitted to hospital: 2/4/2023  2:09 PM    * Fever  Assessment & Plan  · Patient presents due to elevated fevers  100 5 at home, 101 5 at urgent care  He denies feeling feverish  · (+) cough, minimally productive for about 9 days  · Patient was rhinovirus/enterovirus positive  · Received his immunotherapy on Thursday  · WBC 12, meeting sepsis criteria  · CXR concerning for possible left lower lobe pneumonia  · Pt on room air, denies SOB, +cough  Likely viral related  Monitor off further abx  Repeat CXR in about 6 weeks  · No cellulitis  No diarrhea/nausea/vomiting to suspect GI infection  · UA no clear evidence for infection and urine culture negative   · Procalcitonin negative, lactic WNL  · BC x 2 negative at 48 hrs  · Started on cefepime/Vanco empirically -- now discontinued as likely all viral related with no evidence of bacterial infection  Has been afebrile x 48 hrs at least   Stable for discharge     Sepsis [facility])  Assessment & Plan  · POA with fever, leukocytosis (but on steroids)  · Related to rhinovirus likely  · Blood culture negative at 48 hrs, urine culture negative  · Procalcitonin negative   · Empiric antibiotics discontinued  Due to viral infection with rhinovirus        Hypokalemia  Assessment & Plan  · Resolved with replacement  · Continue diuretic as prescribed on discharge     Chronic diastolic congestive heart failure (HCC)  Assessment & Plan  Wt Readings from Last 3 Encounters:   02/04/23 73 5 kg (162 lb)   01/28/23 76 7 kg (169 lb)   12/09/22 75 2 kg (165 lb 12 6 oz)     Not in acute exacerbation  EF 57%, grade 2 diastolic dysfunction  Continue home diuretic on d/c   Patient follows up with cardiology    Essential hypertension  Assessment & Plan  Continue metoprolol and Lasix held as above    Hx of CABG  Assessment & Plan  Continue ASA, statin, metoprolol    Multiple myeloma not having achieved remission ([facility] [address] )  Assessment & Plan  · Follows with [facility] VEGA oncologist  · On daratumumab and Velcade as well as dexamethasone, received his treatment last Thursday    Type 2 diabetes mellitus without complication, without long-term current use of insulin [facility])  Assessment & Plan  Lab Results   Component Value Date    HGBA1C 8 9 (H) 02/01/2023       Recent Labs     02/06/23  0719 02/06/23  1124 02/06/23  1628 02/07/23  0732   POCGLU 164* 204* 173* 182*       Blood Sugar Average: Last 72 hrs:  (P) 183     · Sugars continue to be elevated, secondary to dexamethasone use every Thursday and with immunomodulating therapy  · His oncologist wants him to see an endocrinologist -- consult appreciated  On discharge they recommend increasing  Metformin from 500 mg BID to 1000 mg BID  Can f/u outpt   · Last HBA1c done 2/1/23 was 8 9  · Diabetic diet      Medical Problems     Resolved Problems  Date Reviewed: 2/7/2023   None       Discharging Physician / Practitioner: Leopoldo Section, PA-C  PCP: Keren Ford DO  Admission Date:   Admission Orders (From admission, onward)     Ordered        02/04/23 1550  INPATIENT ADMISSION  Once                      Discharge Date: 02/07/23    Consultations During Hospital Stay:  · Endocrinology     Procedures Performed:   · None     Significant Findings / Test Results:   · CXR: New LLL patchy airspace opacity, suspicoius for pneumonia  Small L pleural effusion  · RP2 panel +rhino/enterovirus   · BC x 2 negative at 48 hrs  · Urine culture negative   · Procalcitonin negative     Incidental Findings:   · None     Test Results Pending at Discharge (will require follow up):    · None     Outpatient Tests Requested:  · Recommend repeat CXR in about 6 weeks     Complications:  None    Reason for Admission: sepsis     Hospital Course:   Calvin Grant is a 69 y o  male patient who originally presented to the hospital on 2/4/2023 due to fevers  He had a fever of 100 5 °F at home, prompting him to go to urgent care  At urgent care he was noted to have a 101 5 temp and referred to the ER  His only presenting complaint other than fever was mildly productive cough for the last 9 days prior to presentation  No BCs were elevated at 12,000, prompting septic work-up  X-ray with possible left lower lobe pneumonia  RP 2 panel was obtained which was positive for rhinovirus  He was started on empiric antibiotics as he is immunocompromised on chemotherapy  Serial procalcitonin negative, blood cultures negative, urine culture negative  Antibiotics were discontinued as likely all related to viral illness with rhinovirus  Patient has been afebrile for at least 48 hours  He denies any complaints at this time other than cough  Denies shortness of breath, nausea, vomiting, diarrhea  He feels well  He is stable for discharge to home with outpatient follow-up  Please see above list of diagnoses and related plan for additional information  Condition at Discharge: good    Discharge Day Visit / Exam:   Subjective:  Patient reports to persistent cough but denies sob or any other complaints  Appetite is good with no n/v/d  Ambulating without difficulty  Vitals: Blood Pressure: 133/64 (02/07/23 0700)  Pulse: 84 (02/07/23 0700)  Temperature: 98 2 °F (36 8 °C) (02/07/23 0700)  Temp Source: Oral (02/07/23 0700)  Respirations: 18 (02/07/23 0700)  Height: 5' 11" (180 3 cm) (02/04/23 1258)  Weight - Scale: 73 5 kg (162 lb) (02/04/23 1258)  SpO2: 94 % (02/07/23 0730)  Exam:   Physical Exam  Vitals reviewed  Constitutional:       General: He is not in acute distress  Appearance: He is not toxic-appearing  HENT:      Head: Normocephalic and atraumatic  Eyes:      Extraocular Movements: Extraocular movements intact     Cardiovascular:      Rate and Rhythm: Normal rate and regular rhythm  Pulmonary:      Effort: Pulmonary effort is normal  No respiratory distress  Breath sounds: Normal breath sounds  No wheezing or rales  Abdominal:      General: There is no distension  Musculoskeletal:         General: Normal range of motion  Cervical back: Normal range of motion  Neurological:      General: No focal deficit present  Mental Status: He is alert and oriented to person, place, and time  Psychiatric:         Mood and Affect: Mood normal          Behavior: Behavior normal          Thought Content: Thought content normal         Discussion with Family: Patient declined call to   Discharge instructions/Information to patient and family:   See after visit summary for information provided to patient and family  Provisions for Follow-Up Care:  See after visit summary for information related to follow-up care and any pertinent home health orders  Disposition:   Home    Planned Readmission: no     Discharge Statement:  I spent 35 minutes discharging the patient  This time was spent on the day of discharge  I had direct contact with the patient on the day of discharge  Greater than 50% of the total time was spent examining patient, answering all patient questions, arranging and discussing plan of care with patient as well as directly providing post-discharge instructions  Additional time then spent on discharge activities  Discharge Medications:  See after visit summary for reconciled discharge medications provided to patient and/or family        **Please Note: This note may have been constructed using a voice recognition system**

## 2023-02-07 NOTE — ASSESSMENT & PLAN NOTE
Lab Results   Component Value Date    HGBA1C 8 9 (H) 02/01/2023       Recent Labs     02/06/23  0719 02/06/23  1124 02/06/23  1628 02/07/23  0732   POCGLU 164* 204* 173* 182*       Blood Sugar Average: Last 72 hrs:  (P) 183     · Sugars continue to be elevated, secondary to dexamethasone use every Thursday and with immunomodulating therapy  · His oncologist wants him to see an endocrinologist -- consult appreciated  On discharge they recommend increasing  Metformin from 500 mg BID to 1000 mg BID    Can f/u outpt   · Last HBA1c done 2/1/23 was 8 9  · Diabetic diet

## 2023-02-07 NOTE — PLAN OF CARE
Problem: PAIN - ADULT  Goal: Verbalizes/displays adequate comfort level or baseline comfort level  Description: Interventions:  - Encourage patient to monitor pain and request assistance  - Assess pain using appropriate pain scale  - Administer analgesics based on type and severity of pain and evaluate response  - Implement non-pharmacological measures as appropriate and evaluate response  - Consider cultural and social influences on pain and pain management  - Notify physician/advanced practitioner if interventions unsuccessful or patient reports new pain  Outcome: Progressing     Problem: INFECTION - ADULT  Goal: Absence or prevention of progression during hospitalization  Description: INTERVENTIONS:  - Assess and monitor for signs and symptoms of infection  - Monitor lab/diagnostic results  - Monitor all insertion sites, i e  indwelling lines, tubes, and drains  - Monitor endotracheal if appropriate and nasal secretions for changes in amount and color  - [place] appropriate cooling/warming therapies per order  - Administer medications as ordered  - Instruct and encourage patient and family to use good hand hygiene technique  - Identify and instruct in appropriate isolation precautions for identified infection/condition  Outcome: Progressing  Goal: Absence of fever/infection during neutropenic period  Description: INTERVENTIONS:  - Monitor WBC    Outcome: Progressing     Problem: SAFETY ADULT  Goal: Patient will remain free of falls  Description: INTERVENTIONS:  - Educate patient/family on patient safety including physical limitations  - Instruct patient to call for assistance with activity   - Consult OT/PT to assist with strengthening/mobility   - Keep Call bell within reach  - Keep bed low and locked with side rails adjusted as appropriate  - Keep care items and personal belongings within reach  - Initiate and maintain comfort rounds  - Make Fall Risk Sign visible to staff  - Offer Toileting every 2 Hours, in advance of need  - Initiate/Maintain alarm  - Obtain necessary fall risk management equipment  - Apply yellow socks and bracelet for high fall risk patients  - Consider moving patient to room near nurses station  Outcome: Progressing  Goal: Maintain or return to baseline ADL function  Description: INTERVENTIONS:  -  Assess patient's ability to carry out ADLs; assess patient's baseline for ADL function and identify physical deficits which impact ability to perform ADLs (bathing, care of mouth/teeth, toileting, grooming, dressing, etc )  - Assess/evaluate cause of self-care deficits   - Assess range of motion  - Assess patient's mobility; develop plan if impaired  - Assess patient's need for assistive devices and provide as appropriate  - Encourage maximum independence but intervene and supervise when necessary  - Involve family in performance of ADLs  - Assess for home care needs following discharge   - Consider OT consult to assist with ADL evaluation and planning for discharge  - Provide patient education as appropriate  Outcome: Progressing  Goal: Maintains/Returns to pre admission functional level  Description: INTERVENTIONS:  - Perform BMAT or MOVE assessment daily    - Set and communicate daily mobility goal to care team and patient/family/caregiver  - Collaborate with rehabilitation services on mobility goals if consulted  - Perform Range of Motion 4 times a day  - Reposition patient every 2 hours    - Dangle patient 4 times a day  - Stand patient 4 times a day  - Ambulate patient 4 times a day  - Out of bed to chair 4 times a day   - Out of bed for meals 3 times a day  - Out of bed for toileting  - Record patient progress and toleration of activity level   Outcome: Progressing     Problem: DISCHARGE PLANNING  Goal: Discharge to home or other facility with appropriate resources  Description: INTERVENTIONS:  - Identify barriers to discharge w/patient and caregiver  - Arrange for needed discharge resources and transportation as appropriate  - Identify discharge learning needs (meds, wound care, etc )  - Arrange for interpretive services to assist at discharge as needed  - Refer to Case Management Department for coordinating discharge planning if the patient needs post-hospital services based on physician/advanced practitioner order or complex needs related to functional status, cognitive ability, or social support system  Outcome: Progressing     Problem: Knowledge Deficit  Goal: Patient/family/caregiver demonstrates understanding of disease process, treatment plan, medications, and discharge instructions  Description: Complete learning assessment and assess knowledge base    Interventions:  - Provide teaching at level of understanding  - Provide teaching via preferred learning methods  Outcome: Progressing     Problem: RESPIRATORY - ADULT  Goal: Achieves optimal ventilation and oxygenation  Description: INTERVENTIONS:  - Assess for changes in respiratory status  - Assess for changes in mentation and behavior  - Position to facilitate oxygenation and minimize respiratory effort  - Oxygen administered by appropriate delivery if ordered  - Initiate smoking cessation education as indicated  - Encourage broncho-pulmonary hygiene including cough, deep breathe, Incentive Spirometry  - Assess the need for suctioning and aspirate as needed  - Assess and instruct to report SOB or any respiratory difficulty  - Respiratory Therapy support as indicated  Outcome: Progressing

## 2023-02-07 NOTE — PLAN OF CARE
Problem: PAIN - ADULT  Goal: Verbalizes/displays adequate comfort level or baseline comfort level  Description: Interventions:  - Encourage patient to monitor pain and request assistance  - Assess pain using appropriate pain scale  - Administer analgesics based on type and severity of pain and evaluate response  - Implement non-pharmacological measures as appropriate and evaluate response  - Consider cultural and social influences on pain and pain management  - Notify physician/advanced practitioner if interventions unsuccessful or patient reports new pain  Outcome: Progressing     Problem: INFECTION - ADULT  Goal: Absence or prevention of progression during hospitalization  Description: INTERVENTIONS:  - Assess and monitor for signs and symptoms of infection  - Monitor lab/diagnostic results  - Monitor all insertion sites, i e  indwelling lines, tubes, and drains  - Monitor endotracheal if appropriate and nasal secretions for changes in amount and color  - [place] appropriate cooling/warming therapies per order  - Administer medications as ordered  - Instruct and encourage patient and family to use good hand hygiene technique  - Identify and instruct in appropriate isolation precautions for identified infection/condition  Outcome: Progressing  Goal: Absence of fever/infection during neutropenic period  Description: INTERVENTIONS:  - Monitor WBC    Outcome: Progressing     Problem: SAFETY ADULT  Goal: Patient will remain free of falls  Description: INTERVENTIONS:  - Educate patient/family on patient safety including physical limitations  - Instruct patient to call for assistance with activity   - Consult OT/PT to assist with strengthening/mobility   - Keep Call bell within reach  - Keep bed low and locked with side rails adjusted as appropriate  - Keep care items and personal belongings within reach  - Initiate and maintain comfort rounds  - Make Fall Risk Sign visible to staff  - Offer Toileting every 2 Hours, in advance of need  - Initiate/Maintain alarm  - Obtain necessary fall risk management equipment  - Apply yellow socks and bracelet for high fall risk patients  - Consider moving patient to room near nurses station  Outcome: Progressing  Goal: Maintain or return to baseline ADL function  Description: INTERVENTIONS:  -  Assess patient's ability to carry out ADLs; assess patient's baseline for ADL function and identify physical deficits which impact ability to perform ADLs (bathing, care of mouth/teeth, toileting, grooming, dressing, etc )  - Assess/evaluate cause of self-care deficits   - Assess range of motion  - Assess patient's mobility; develop plan if impaired  - Assess patient's need for assistive devices and provide as appropriate  - Encourage maximum independence but intervene and supervise when necessary  - Involve family in performance of ADLs  - Assess for home care needs following discharge   - Consider OT consult to assist with ADL evaluation and planning for discharge  - Provide patient education as appropriate  Outcome: Progressing  Goal: Maintains/Returns to pre admission functional level  Description: INTERVENTIONS:  - Perform BMAT or MOVE assessment daily    - Set and communicate daily mobility goal to care team and patient/family/caregiver  - Collaborate with rehabilitation services on mobility goals if consulted  - Perform Range of Motion 4 times a day  - Reposition patient every 2 hours    - Dangle patient 4 times a day  - Stand patient 4 times a day  - Ambulate patient 4 times a day  - Out of bed to chair 4 times a day   - Out of bed for meals 3 times a day  - Out of bed for toileting  - Record patient progress and toleration of activity level   Outcome: Progressing     Problem: DISCHARGE PLANNING  Goal: Discharge to home or other facility with appropriate resources  Description: INTERVENTIONS:  - Identify barriers to discharge w/patient and caregiver  - Arrange for needed discharge resources and transportation as appropriate  - Identify discharge learning needs (meds, wound care, etc )  - Arrange for interpretive services to assist at discharge as needed  - Refer to Case Management Department for coordinating discharge planning if the patient needs post-hospital services based on physician/advanced practitioner order or complex needs related to functional status, cognitive ability, or social support system  Outcome: Progressing     Problem: Knowledge Deficit  Goal: Patient/family/caregiver demonstrates understanding of disease process, treatment plan, medications, and discharge instructions  Description: Complete learning assessment and assess knowledge base    Interventions:  - Provide teaching at level of understanding  - Provide teaching via preferred learning methods  Outcome: Progressing     Problem: RESPIRATORY - ADULT  Goal: Achieves optimal ventilation and oxygenation  Description: INTERVENTIONS:  - Assess for changes in respiratory status  - Assess for changes in mentation and behavior  - Position to facilitate oxygenation and minimize respiratory effort  - Oxygen administered by appropriate delivery if ordered  - Initiate smoking cessation education as indicated  - Encourage broncho-pulmonary hygiene including cough, deep breathe, Incentive Spirometry  - Assess the need for suctioning and aspirate as needed  - Assess and instruct to report SOB or any respiratory difficulty  - Respiratory Therapy support as indicated  Outcome: Progressing

## 2023-02-07 NOTE — ASSESSMENT & PLAN NOTE
Wt Readings from Last 3 Encounters:   02/04/23 73 5 kg (162 lb)   01/28/23 76 7 kg (169 lb)   12/09/22 75 2 kg (165 lb 12 6 oz)     Not in acute exacerbation  EF 26%, grade 2 diastolic dysfunction  Continue home diuretic on d/c   Patient follows up with cardiology

## 2023-02-07 NOTE — ASSESSMENT & PLAN NOTE
"Hospital Medicine Daily Progress Note    Date of Service  10/1/2021    Chief Complaint  Gary Severino Gil is a 79 y.o. male admitted 9/30/2021 with fall    Hospital Course  80 yo male with history of COPD on home oxygen 3L and lymphoma, who presents to the Emergency Department for evaluation of mild bilateral shoulder pain secondary to a fall. Apparently he fell twice yesterday. He called EMS the first time, who helped him get back onto the couch. He slept for three hours. When he woke up he felt dizzy and he fell again. He admits to associated symptoms of bilateral shoulder pain, but denies shortness of breath, pain with deep breaths, fevers, or chills.\"    CTA: no evidence of PE  EKG: Afib  Noted Elevated CPK 2677.   On IVF    Interval Problem Update  Patient was noted very lethargic and sleepy. Provided minimal histories.  On 3L oxymask, which is at his baseline.   He is noted to have a brief episode of tachycardia, last for 30sec and converted back to NSR after deep breath. Ordered EKG stat showing Afib. Start full dose AC.   Denies fever, chills, chest pain, shortness breath, nausea, vomiting, abdominal pain   IVF  Trending trop  Echo  PT/OT  Spoke to daughter Nayeli ([phone]) and updated patient's conditions and treatment plans. Answered all questions.   I have personally seen and examined the patient at bedside. I discussed the plan of care with patient, family and bedside RN.    Consultants/Specialty  none    Code Status  Full Code    Disposition  Patient is not medically cleared.   Anticipate discharge to D.  I have placed the appropriate orders for post-discharge needs.    Review of Systems  Review of Systems   Constitutional: Positive for malaise/fatigue.   Cardiovascular: Negative for chest pain.   Gastrointestinal: Negative for nausea and vomiting.   Musculoskeletal: Positive for falls.   Neurological: Positive for dizziness and weakness.   All other systems reviewed and are negative.   " Continue ASA, statin, metoprolol     Physical Exam  Temp:  [35.8 °C (96.5 °F)-36.4 °C (97.5 °F)] 36.3 °C (97.3 °F)  Pulse:  [] 106  Resp:  [16-66] 31  BP: (110-139)/(56-88) 133/73  SpO2:  [91 %-100 %] 93 %    Physical Exam  Vitals and nursing note reviewed.   Constitutional:       Appearance: Normal appearance. He is obese. He is ill-appearing.   HENT:      Head: Normocephalic and atraumatic.      Mouth/Throat:      Mouth: Mucous membranes are dry.      Pharynx: Oropharynx is clear.   Eyes:      Pupils: Pupils are equal, round, and reactive to light.   Neck:      Vascular: No carotid bruit.   Cardiovascular:      Rate and Rhythm: Tachycardia present. Rhythm irregular.   Pulmonary:      Effort: Pulmonary effort is normal.      Breath sounds: Normal breath sounds.      Comments: On 3L NC  Abdominal:      General: Abdomen is flat. Bowel sounds are normal.      Palpations: Abdomen is soft. There is no mass.      Tenderness: There is no abdominal tenderness.   Musculoskeletal:         General: No swelling or tenderness. Normal range of motion.      Cervical back: Neck supple.   Skin:     General: Skin is warm and dry.   Neurological:      General: No focal deficit present.      Mental Status: He is alert.      Comments: Somnolent, but arousable, follows commands. AOx2   Psychiatric:         Mood and Affect: Mood normal.         Behavior: Behavior normal.         Fluids    Intake/Output Summary (Last 24 hours) at 10/1/2021 1355  Last data filed at 10/1/2021 0600  Gross per 24 hour   Intake 2205.77 ml   Output 650 ml   Net 1555.77 ml       Laboratory  Recent Labs     09/30/21  1611 10/01/21  0335   WBC 25.5* 26.8*   RBC 5.63 5.36   HEMOGLOBIN 15.9 14.8   HEMATOCRIT 49.9 47.0   MCV 88.6 87.7   MCH 28.2 27.6   MCHC 31.9* 31.5*   RDW 50.8* 49.4   PLATELETCT 295 292   MPV 9.6 9.7     Recent Labs     09/30/21  1611 10/01/21  0335   SODIUM 137 141   POTASSIUM 3.9 4.3   CHLORIDE 97 103   CO2 31 29   GLUCOSE 114* 83   BUN 8 7*   CREATININE 0.74 0.44*    CALCIUM 8.6 7.6*                   Imaging  CT-CTA CHEST PULMONARY ARTERY W/ RECONS   Final Result      1.  There is no CT evidence of acute pulmonary embolism.   2.  There is underlying emphysema with interstitial lung disease in the left upper lobe.   3.  Bibasilar airspace disease is likely atelectasis. There is no acute pneumonia.   4.  There is a trace of dependent right pleural effusion.   5.  There is lymphadenopathy involving the left supraclavicular region, mediastinum, bilateral hilar regions and bilateral axillary regions. There are also subcutaneous nodules or right breast nodules. There are a few small new normal size lymph nodes in    the upper abdomen. These findings could represent a metastatic process versus lymphoma.            DX-CHEST-PORTABLE (1 VIEW)   Final Result      1.  Right basilar opacity may represent atelectasis or consolidation.   2.  Mild interstitial prominence may represent mild edema.   3.  Cardiomegaly.   4.  Atherosclerotic plaque.           Assessment/Plan  * Acute exacerbation of chronic obstructive pulmonary disease (COPD) (HCC)- (present on admission)  Assessment & Plan  -Baseline is at 3L  -Will place on aerosol mask, patient's preference  -Solumedrol 40mg q8 hours, taper  -check procalcitonin, start empirical abx unasyn and azithromycin  -Nebs PRN      Fall  Assessment & Plan  Fall precautions  PT/OT    A-fib (HCC)  Assessment & Plan  New onset, denies history of Afib  TSH  Echo  Start metoprolol and Lovenox full dose    Leukocytosis- (present on admission)  Assessment & Plan  Sec to lymphoma, however underlying infectious process could not ruled out  Check procalcitonin, blood culture  Start abx unasyn and azithromycin    Lymphoma (HCC)- (present on admission)  Assessment & Plan  -oncologist is Dr. Garcia  -Will be starting chemo next week     Troponin level elevated- (present on admission)  Assessment & Plan  -Trend trop  -Likely due to demand     Rhabdomyolysis-  (present on admission)  Assessment & Plan  -LR, monitor BUN/Cr    Syncope- (present on admission)  Assessment & Plan  -Tele monitor  -Likely due to hypoxemia  -Trend trop  -ECHO  -Orthostatic vitals        VTE prophylaxis: therapeutic anticoagulation with lovenox    I have performed a physical exam and reviewed and updated ROS and Plan today (10/1/2021). In review of yesterday's note (9/30/2021), there are no changes except as documented above.

## 2023-02-08 NOTE — UTILIZATION REVIEW
NOTIFICATION OF ADMISSION DISCHARGE   This is a Notification of Discharge from [facility]  Please be advised that this patient has been discharge from our facility  Below you will find the admission and discharge date and time including the patient’s disposition  UTILIZATION REVIEW CONTACT:  Taj Viveros  Utilization   Network Utilization Review Department  Phone: [phone] carefully listen to the prompts  All voicemails are confidential   Email: [email or web address]     ADMISSION INFORMATION  PRESENTATION DATE: 2/4/2023  2:09 PM  OBERVATION ADMISSION DATE:   INPATIENT ADMISSION DATE: 2/4/23  3:50 PM   DISCHARGE DATE: 2/7/2023  9:59 AM   DISPOSITION:Home/Self Care    IMPORTANT INFORMATION:  Send all requests for admission clinical reviews, approved or denied determinations and any other requests to dedicated fax number below belonging to the campus where the patient is receiving treatment   List of dedicated fax numbers:  [address] DENIALS (Administrative/Medical Necessity) [phone]   [address] (Maternity/NICU/Pediatrics) [phone]   [place] [phone]   [facility] [phone]   [address] [phone]   [address] [facility] [phone]   [address] [place] [address] [phone]   [facility] 119 [phone]   [facility]  [phone]   [facility] [phone]   [address] [facility] [address] [facility] [phone]

## 2023-02-09 ENCOUNTER — TELEPHONE (OUTPATIENT)
Dept: ENDOCRINOLOGY | Facility: HOSPITAL | Age: 78
End: 2023-02-09

## 2023-02-09 NOTE — TELEPHONE ENCOUNTER
Patient saw Dr Flores Rangel as a consult in the hospital   Need to schedule a 4 week follow up appointment (allow 40 minutes)  Left message yesterday and today for patient to schedule appointment (early March 2023)

## 2023-02-10 LAB
BACTERIA BLD CULT: NORMAL
BACTERIA BLD CULT: NORMAL

## 2023-02-11 LAB
ATRIAL RATE: 86 BPM
P AXIS: -16 DEGREES
PR INTERVAL: 208 MS
QRS AXIS: 121 DEGREES
QRSD INTERVAL: 134 MS
QT INTERVAL: 414 MS
QTC INTERVAL: 495 MS
T WAVE AXIS: 29 DEGREES
VENTRICULAR RATE: 86 BPM

## 2023-02-13 ENCOUNTER — TELEPHONE (OUTPATIENT)
Dept: CARDIOLOGY CLINIC | Facility: CLINIC | Age: 78
End: 2023-02-13

## 2023-02-13 NOTE — TELEPHONE ENCOUNTER
LM for pt to schedule a follow-up with Dr Viet White as he is due for an appt and at that time, Dr Viet White can speak with him about his EKG results

## 2023-02-16 ENCOUNTER — OFFICE VISIT (OUTPATIENT)
Dept: FAMILY MEDICINE CLINIC | Facility: HOSPITAL | Age: 78
End: 2023-02-16

## 2023-02-16 VITALS
DIASTOLIC BLOOD PRESSURE: 54 MMHG | SYSTOLIC BLOOD PRESSURE: 102 MMHG | HEART RATE: 88 BPM | WEIGHT: 164.6 LBS | BODY MASS INDEX: 23.04 KG/M2 | TEMPERATURE: 96.3 F | HEIGHT: 71 IN

## 2023-02-16 DIAGNOSIS — I48.0 PAROXYSMAL ATRIAL FIBRILLATION (HCC): Chronic | ICD-10-CM

## 2023-02-16 DIAGNOSIS — Z09 HOSPITAL DISCHARGE FOLLOW-UP: Primary | ICD-10-CM

## 2023-02-16 DIAGNOSIS — R50.9 FEVER, UNSPECIFIED FEVER CAUSE: ICD-10-CM

## 2023-02-16 DIAGNOSIS — Z79.899 IMMUNOSUPPRESSION DUE TO DRUG THERAPY (HCC): ICD-10-CM

## 2023-02-16 DIAGNOSIS — E11.9 TYPE 2 DIABETES MELLITUS WITHOUT COMPLICATION, WITHOUT LONG-TERM CURRENT USE OF INSULIN (HCC): Chronic | ICD-10-CM

## 2023-02-16 DIAGNOSIS — I25.119 CORONARY ARTERY DISEASE INVOLVING NATIVE HEART WITH ANGINA PECTORIS, UNSPECIFIED VESSEL OR LESION TYPE (HCC): ICD-10-CM

## 2023-02-16 DIAGNOSIS — E87.6 HYPOKALEMIA: ICD-10-CM

## 2023-02-16 DIAGNOSIS — A41.9 SEPSIS WITHOUT ACUTE ORGAN DYSFUNCTION, DUE TO UNSPECIFIED ORGANISM (HCC): ICD-10-CM

## 2023-02-16 DIAGNOSIS — D84.821 IMMUNOSUPPRESSION DUE TO DRUG THERAPY (HCC): ICD-10-CM

## 2023-02-16 RX ORDER — DEXAMETHASONE 4 MG/1
TABLET ORAL
COMMUNITY
Start: 2023-02-12

## 2023-02-16 NOTE — PROGRESS NOTES
Assessment & Plan     1  Hospital discharge follow-up    2  Fever, unspecified fever cause  Comments:  + enterovirus/rhonovirus, abx stopped, pt gradually improving from viral illness, call with relapse in symptoms, recheck CXR in 6 wks - order given, will follow  Orders:  -     XR chest pa & lateral; Future; Expected date: 03/30/2023    3  Sepsis without acute organ dysfunction, due to unspecified organism [facility])  Comments:  Resolved, d/t viral illness noted above, call with relapse in symptoms    4  Type 2 diabetes mellitus without complication, without long-term current use of insulin (HCC)  Comments:  Great improvement in sugars with increase in Metformin, still spikes after Dexamethasone but comes down quickly, has appt w/Endo in March, UTD on DM foot exam (8/22) and eye exam (3/22 - 2yrs)    5  Hypokalemia  Comments:  Nml upon discharge, will follow    6  Coronary artery disease involving native heart with angina pectoris, unspecified vessel or lesion type ([facility] [address] )  Comments:  ECG with NS ST/T wave changes, has appt with Cardio, on ASA/statin/beta blocker, no current CV symptoms - call if they occur    7  Paroxysmal atrial fibrillation (HCC)  Comments:  Currently NSR and rate controlled on metoprolol, has appt with Cardio, con't meds and f/u as per Cardio, call wtih CV symptoms    8  Immunosuppression due to drug therapy [facility])  Comments:  Has regular f/u and f/u with Onc, call with F/C/sores/wounds    Colonoscopy 1/21 - 5 yrs    DM labs A1C 8 9 2/23  DM foot exam 8/22  DM eye exam 3/22 - 2 yrs    Going on cruise to [facility] in April     Subjective     Transitional Care Management Review:   Cruzito Figueroa is a 69 y o  male here for TCM follow up  Pt was admitted to [facility] from 2/4/23 to 2/7/23  Records were reviewed by myself in detail and events are summarized below       During the TCM phone call patient stated:  TCM Call     Date and time call was made  2/7/2023  [facility] reviewed  Records reviewed Patient was hospitialized at  [address] [place] [address]    Date of Admission  02/05/23    Date of discharge  02/07/23    Diagnosis  Fever    Disposition  Home    Were the patients medications reviewed and updated  No    Current Symptoms  None    Loose stool severity  Moderate      TCM Call     Post hospital issues  None    Should patient be enrolled in anticoag monitoring? No    Scheduled for follow up? Yes    Not clinically warranted  Patient already seen for TCM in the last 30 days    Patients specialists  Pulmonlolgist    Did you obtain your prescribed medications  Yes    Why were you unable to obtain your medications  No new medications    Do you need help managing your prescriptions or medications  No    Is transportation to your appointment needed  No    I have advised the patient to call PCP with any new or worsening symptoms  Soto Lua MA    Living Arrangements  Family members    Are you recieving any outpatient services  No    Are you recieving home care services  Yes    Types of home care services  Nurse visit    Are you using any community resources  No    Current waiver services  No    Have you fallen in the last 12 months  No    Interperter language line needed  No    Counseling  Patient    Comments  TCM not warranted at this time as he is getting surgery 4/28/22        HPI Pt presented to [facility] on 2/4/23 with c/u fever and flu like symptoms  In the ED his O2 sat was 95% on RA and rest of VSS  Exam notable for rales on exam   Work up in ED notable for K 3 0 and Glu 193  WBC 12 84 and hgb 11 9  Lactate, procalcitonin and COVID/Flu/RSV were negative  ECG showed NS ST changes and CXR showed LLL opacity and small L pleural effusion  Pt was admitted for sepsis and presumed pneumonia  He was started on Vanco and Cefepime  Endo saw pt for his DM and he was covered with SSI for any hyperglycemia  BC neg x2 and viral panel came back + enterovirus/rhinovirus    Procalcitonin neg x 2 and abx were stopped  Pt improved clinically and was afeberile x 48 hrs and discharged home on 2/7/23  Med list reviewed  Pt has been gradually improving since returning home  He has had no fever since discharge  He still has a cough but is improving  He is checking his sugars at home for his DM and after the dexamethasone his BS go up  Fasting in the am is 110-130's and later in the day he is 130-150's  After the dexamethasone he will go up to 300's but by the next day is back down into 190's in am and 130's by afternoon  He has appt to establish with Endo 3/13/23  He had Metformin XR increased to 1000 mg bid upon discharge and has had great benefit as noted above  He has had some loose stools for about a week or so  Has f/u with Cardio as he was told his ECG was abnormal   He has known CAD and a fib with chronic diastolic HF  He notes no CP/palp/LE edema/orthopnea  Review of Systems   Constitutional: Positive for fatigue and unexpected weight change  Negative for chills and fever  HENT: Positive for trouble swallowing  Negative for congestion  Eyes: Negative for pain and visual disturbance  Respiratory: Positive for cough  Negative for shortness of breath and wheezing  Cardiovascular: Negative for chest pain, palpitations and leg swelling  Gastrointestinal: Positive for diarrhea  Negative for abdominal pain, blood in stool, constipation, nausea and vomiting  Genitourinary: Negative for difficulty urinating and dysuria  Musculoskeletal: Positive for back pain  Negative for arthralgias and myalgias  Skin: Negative for rash and wound  Neurological: Negative for dizziness and headaches  Hematological: Does not bruise/bleed easily  Psychiatric/Behavioral: Negative for confusion and dysphoric mood         Objective     /54   Pulse 88   Temp (!) 96 3 °F (35 7 °C) (Tympanic)   Ht 5' 11" (1 803 m)   Wt 74 7 kg (164 lb 9 6 oz)   BMI 22 96 kg/m²      Physical Exam  Vitals and nursing note reviewed  Constitutional:       General: He is not in acute distress  Appearance: He is well-developed  He is ill-appearing  HENT:      Head: Normocephalic and atraumatic  Right Ear: Tympanic membrane and external ear normal       Left Ear: Tympanic membrane and external ear normal    Eyes:      General:         Right eye: No discharge  Left eye: No discharge  Conjunctiva/sclera: Conjunctivae normal    Neck:      Trachea: No tracheal deviation  Cardiovascular:      Rate and Rhythm: Normal rate and regular rhythm  Heart sounds: Murmur heard  Pulmonary:      Effort: Pulmonary effort is normal  No respiratory distress  Breath sounds: Rhonchi present  No wheezing  Comments: LLL rhonchi  Abdominal:      General: There is no distension  Palpations: Abdomen is soft  Tenderness: There is no abdominal tenderness  There is no guarding or rebound  Musculoskeletal:      Cervical back: Neck supple  No tenderness  Right lower leg: No edema  Left lower leg: No edema  Skin:     General: Skin is warm and dry  Coloration: Skin is not pale  Findings: No rash  Neurological:      General: No focal deficit present  Mental Status: He is alert  Mental status is at baseline  Motor: No abnormal muscle tone  Gait: Gait normal    Psychiatric:         Mood and Affect: Mood normal          Behavior: Behavior normal          Thought Content:  Thought content normal          Judgment: Judgment normal        Medications have been reviewed by provider in current encounter    Maritza Victoria DO

## 2023-02-23 ENCOUNTER — HOSPITAL ENCOUNTER (OUTPATIENT)
Dept: MRI IMAGING | Facility: HOSPITAL | Age: 78
End: 2023-02-23
Attending: FAMILY MEDICINE

## 2023-02-23 DIAGNOSIS — M54.2 NECK PAIN: ICD-10-CM

## 2023-02-23 DIAGNOSIS — C90.00 MULTIPLE MYELOMA, REMISSION STATUS UNSPECIFIED (HCC): ICD-10-CM

## 2023-02-23 DIAGNOSIS — Z85.79 HISTORY OF LYMPHOMA: ICD-10-CM

## 2023-02-24 NOTE — ASSESSMENT & PLAN NOTE
· Follows with Dr Hailey Vance with [facility] [phone]  · Considering lung biopsy, patient currently not stable enough to undergo biopsy  · PET scan was done through [facility]- concerning for illuminating areas, question possible reactive inflammatory response versus tumors    Patient will need outpatient follow-up with his oncologist   · Recommending IVIG- ordered 5/6 24-Feb-2023 22:13

## 2023-03-01 ENCOUNTER — CONSULT (OUTPATIENT)
Dept: PAIN MEDICINE | Facility: CLINIC | Age: 78
End: 2023-03-01

## 2023-03-01 VITALS
DIASTOLIC BLOOD PRESSURE: 78 MMHG | BODY MASS INDEX: 22.68 KG/M2 | SYSTOLIC BLOOD PRESSURE: 140 MMHG | WEIGHT: 162 LBS | HEART RATE: 86 BPM | HEIGHT: 71 IN | TEMPERATURE: 98.6 F

## 2023-03-01 DIAGNOSIS — M48.02 FORAMINAL STENOSIS OF CERVICAL REGION: Primary | ICD-10-CM

## 2023-03-01 DIAGNOSIS — M47.812 CERVICAL SPONDYLOSIS: ICD-10-CM

## 2023-03-01 DIAGNOSIS — E11.9 TYPE 2 DIABETES MELLITUS WITHOUT COMPLICATION, WITHOUT LONG-TERM CURRENT USE OF INSULIN (HCC): Chronic | ICD-10-CM

## 2023-03-01 DIAGNOSIS — M54.2 NECK PAIN: ICD-10-CM

## 2023-03-01 DIAGNOSIS — M54.12 CERVICAL RADICULOPATHY: ICD-10-CM

## 2023-03-01 NOTE — PROGRESS NOTES
Assessment  1  Foraminal stenosis of cervical region    2  Cervical radiculopathy - Right    3  Cervical spondylosis    4  Type 2 diabetes mellitus without complication, without long-term current use of insulin ([address] )        Plan    The patient's pain persists despite time, relative rest, activity modification and physician supervised home exercises  I believe that Hipolito Mcclellan may benefit from cervical epidural steroid injection to directly diminish any inflammatory component of his pain  I will initially use an translaminar approach  If the patient does not receive significant pain relief following the initial injection, I may need to repeat using a transforaminal approach that may allow for better concentrate of the steroid along the affected nerve root  Given the patient's history of diabetes, there may be an increased risk of infection in association with the procedure although the overall risk is low  In addition, following the injection there may be a transient elevation in serum glucose levels for several days  The patient understands that this may require additional glycemic coverage in coordination with the treating physician  In the office today, we reviewed the nature of the patient's pathology in depth using diagrams and models  I discussed the approach I would use for the epidural steroid injection and provided literature for home review  The patient understands the risks associated with the procedure including but not limited to bleeding, infection, tissue injury, exacerbation of symptoms, allergic reaction, decreased immunity secondary to steroid, spinal headache, and paralysis and provided verbal consent today  My impressions and treatment recommendations were discussed in detail with the patient who verbalized understanding and had no further questions  Discharge instructions were provided  I personally saw and examined the patient and I agree with the above discussed plan of care    This note is created using dictation transcription  It may contain typographical errors, grammatical errors, improperly dictated words, background noise and other errors  Orders Placed This Encounter   Procedures   • FL spine and pain procedure     Standing Status:   Future     Standing Expiration Date:   3/1/2027     Order Specific Question:   Reason for Exam:     Answer:   TARAH to the right 1     Order Specific Question:   Anticoagulant hold needed? Answer:   no   • FL spine and pain procedure     Standing Status:   Future     Standing Expiration Date:   3/1/2027     Order Specific Question:   Reason for Exam:     Answer:   TARAH to the right 2     Order Specific Question:   Anticoagulant hold needed? Answer:   no     No orders of the defined types were placed in this encounter  Referred By: Yash Orozco III DO  History of Present Illness    Floyd Stratton is a 69 y o  male next month history of right-sided neck pain rating down his right arm  He is unaware of any clear precipitant denies any trauma or injury  He he saw orthopedics who provided home exercises  After failure of relief he underwent an MRI  He reports his pain is moderate to severe is 7 out of 10 on the visual analog scale significant interfering with his daily living activities  Starts the right side of his neck down the axial spine and down into his right arm  Screen is numbness in his arm pins-and-needles and sharp sensation  Denies any weakness of his upper limbs  Reports that exercise aggravates his symptoms which provided no relief  Heat and ice provided moderate relief  Denies any weakness of the upper limbs  I have personally reviewed and/or updated the patient's past medical history, past surgical history, family history, social history, current medications, allergies, and vital signs today  Review of Systems   Constitutional: Negative for fever and unexpected weight change     HENT: Negative for trouble swallowing  Eyes: Negative for visual disturbance  Respiratory: Negative for shortness of breath and wheezing  Cardiovascular: Negative for chest pain and palpitations  Gastrointestinal: Negative for constipation, diarrhea, nausea and vomiting  Endocrine: Negative for cold intolerance, heat intolerance and polydipsia  Genitourinary: Negative for difficulty urinating and frequency  Musculoskeletal: Negative for arthralgias, gait problem, joint swelling and myalgias  Skin: Negative for rash  Neurological: Negative for dizziness, seizures, syncope, weakness and headaches  Hematological: Does not bruise/bleed easily  Psychiatric/Behavioral: Negative for dysphoric mood  All other systems reviewed and are negative        Patient Active Problem List   Diagnosis   • Basal cell carcinoma of skin   • Coronary artery disease involving native heart with angina pectoris, unspecified vessel or lesion type ([place] [address] )   • Type 2 diabetes mellitus without complication, without long-term current use of insulin ([facility])   • Dyslipidemia   • Erectile dysfunction   • Fatty liver   • Lymphoma ([place] [address] )   • Malignant tumor of cecum (HCC)   • Multiple myeloma not having achieved remission ([place] [address] )   • Primary localized osteoarthritis of right knee   • Hx of CABG   • Essential hypertension   • S/P total knee arthroplasty, left   • Chronic diastolic congestive heart failure (HCC)   • History of aortic valve replacement with bioprosthetic valve   • Leukocytosis   • Paroxysmal atrial fibrillation (HCC)   • Pharyngeal dysphagia   • Personal history of colon cancer   • Roberts's esophagus without dysplasia   • Schatzki's ring of distal esophagus   • Gastroesophageal reflux disease with esophagitis   • Hypokalemia   • Iron deficiency anemia   • Mouth ulcers   • Chest pain   • Glossitis   • CVID (common variable immunodeficiency) ([facility])   • Fever   • Sepsis ([place] [address] )       Past Medical History:   Diagnosis Date   • Acute respiratory failure with hypoxia ([place] [address] ) 04/20/2022   • Arthritis    • Cancer 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[address] )        Past Surgical History:   Procedure Laterality Date   • AORTIC VALVE REPLACEMENT     • CARDIAC VALVE REPLACEMENT  2014    aorta   • CATARACT EXTRACTION Bilateral    • COLECTOMY     • COLONOSCOPY  11/2019    Prior right hemicolectomy   • CORONARY ARTERY BYPASS GRAFT     • DENTAL SURGERY     • JOINT REPLACEMENT  January 2019    left knee   • KNEE SURGERY     • LYMPH NODE BIOPSY  2003   • LYMPHADENECTOMY     • OTHER SURGICAL HISTORY      MAZE PROCEDURE   • NY ARTHRP KNE CONDYLE&PLATU MEDIAL&LAT COMPARTMENTS Left 01/28/2019    Procedure: ARTHROPLASTY LEFT KNEE TOTAL;  Surgeon: Marisol Duckworth MD;  Location:  MAIN OR;  Service: Orthopedics   • NY LARYNGOSCOPY DIRECT OPERATIVE W/BIOPSY Right 04/27/2022    Procedure: DIRECT LARYNGOSCOPY WITH BIOPSY RIGHT PHARYNX, POSSIBLE RIGHT NECK LYMPH NODE BIOPSY; FROZEN SECTION;  Surgeon: Amadou Shukla MD;  Location:  MAIN OR;  Service: ENT   • SKIN BIOPSY     • UPPER GASTROINTESTINAL ENDOSCOPY  11/2019    Schatzki ring   • US GUIDED LYMPH NODE BIOPSY RIGHT  12/14/2021       Family History   Problem Relation Age of Onset   • Heart block Family    • Coronary artery disease Mother    • Thrombosis Mother    • Hypertension Mother    • Arthritis Mother    • Hyperlipidemia Mother    • Diabetes Father    • Hypertension Father    • Arthritis Father    • Sudden death Father         cardiac   • Colon cancer Paternal Grandfather    • Cancer Paternal Grandfather    • Breast cancer Sister    • Heart disease Brother         Coronary stents       Social History     Occupational History   • Occupation: WORKING FULLTIME Tobacco Use   • Smoking status: Former     Packs/day: 1 00     Years: 40 00     Pack years: 40 00     Types: Cigarettes     Start date: 47     Quit date: 2009     Years since quittin 1   • Smokeless tobacco: Never   Vaping Use   • Vaping Use: Never used   Substance and Sexual Activity   • Alcohol use:  Yes     Alcohol/week: 2 0 standard drinks     Types: 2 Cans of beer per week     Comment: very rare "beer here and there"   • Drug use: No   • Sexual activity: Not Currently     Partners: Female     Birth control/protection: None       Current Outpatient Medications on File Prior to Visit   Medication Sig   • aspirin 81 MG tablet Take 1 tablet (81 mg total) by mouth daily   • atorvastatin (LIPITOR) 40 mg tablet TAKE 1 TABLET BY MOUTH EVERY DAY   • bortezomib (VELCADE) Inject 2 5 mg under the skin see administration instructions Every 2 months   • cholecalciferol (VITAMIN D3) 1,000 units tablet Take 2 tablets (2,000 Units total) by mouth daily   • Daratumumab-Hyaluronidase-fihj (DARZALEX FASPRO SC) Inject 1,800 mg under the skin Twice a month   • dexamethasone (DECADRON) 4 mg tablet Take 5 pills (20 mg) once a week on Thrus   • dexamethasone, PF, (DECADRON) 10 mg/mL 20 mg see administration instructions Twice a month   • ferrous sulfate 324 (65 Fe) mg Take 1 tablet (324 mg total) by mouth daily with breakfast   • furosemide (LASIX) 20 mg tablet TAKE 1 TABLET BY MOUTH TWICE A DAY   • IMMUNE GLOBULIN, HUMAN, IV Inject 30,000 mg into a catheter in a vein   • metFORMIN (GLUCOPHAGE-XR) 500 mg 24 hr tablet Take 2 tablets (1,000 mg total) by mouth 2 (two) times a day with meals   • metoprolol succinate (TOPROL-XL) 100 mg 24 hr tablet TAKE 1 TABLET BY MOUTH EVERY DAY   • montelukast (SINGULAIR) 10 mg tablet Take 10 mg by mouth daily   • multivitamin-minerals (CENTRUM ADULTS) tablet Take 1 tablet by mouth daily   • nitroglycerin (NITROSTAT) 0 3 mg SL tablet Nitrostat 0 3 mg sublingual tablet   Place 1 tablet as needed by sublingual route as needed for 90 days  • omeprazole (PriLOSEC) 40 MG capsule TAKE 1 CAPSULE (40 MG TOTAL) BY MOUTH DAILY  • valACYclovir (VALTREX) 500 mg tablet Take 500 mg by mouth daily     No current facility-administered medications on file prior to visit  Allergies   Allergen Reactions   • Ceftin [Cefuroxime] Itching     However, has tolerated Cefazolin and Pip-Tazo since, which have different side chains  Be cautious with / avoid 2nd, 3rd, or 4th Gen Cephs that have similar side chains to Cefuroxime  • Lantus [Insulin Glargine] Itching       Physical Exam    /78 (BP Location: Left arm, Patient Position: Sitting, Cuff Size: Standard)   Pulse 86   Temp 98 6 °F (37 °C)   Ht 5' 11" (1 803 m)   Wt 73 5 kg (162 lb)   BMI 22 59 kg/m²     Constitutional: normal, well developed, well nourished, alert, in no distress and non-toxic and no overt pain behavior  and underweight  Eyes: anicteric  HEENT: grossly intact  Neck: supple, symmetric, trachea midline and no masses   Pulmonary:even and unlabored  Cardiovascular:No edema or pitting edema present  Skin:Normal without rashes or lesions and well hydrated  Psychiatric:Mood and affect appropriate  Neurologic:Cranial Nerves II-XII grossly intact  Musculoskeletal:normal,  Inspection: Normal station and gait  Normal cervical curves and head posture  Skin intact without erythema  No sensory loss      Palpation: There is no tenderness to palpation overlying the bilateral cervical paraspinals, cervical facet joints  There is no tenderness over the upper trapezius muscles bilateral  No shoulder tenderness  Motor/Strength: Equal strength in the bilateral upper extremities  Reflexes: equal but diminished and symmetric in the upper limbs  Sensation: Sensation intact to light touch and pinprick in the upper limbs  Maneuvers: Positive cervical Spurling maneuver on the right  Negative Lhermitte's sign      Imaging  MRI CERVICAL SPINE WITHOUT CONTRAST @  2-23-23     INDICATION: M54 2: Cervicalgia  C90 00: Multiple myeloma not having achieved remission  Z85 79: Personal history of other malignant neoplasms of lymphoid, hematopoietic and related tissues      COMPARISON:  Cervical radiographs 1/28/2023     TECHNIQUE:  Multiplanar, multisequence imaging of the cervical spine was performed             IMAGE QUALITY:  Diagnostic     FINDINGS:     ALIGNMENT:  Reversal of the mid to lower cervical lordosis  No compression fracture  No subluxation  No scoliosis      MARROW SIGNAL:  Normal marrow signal is identified within the visualized bony structures  Mild marrow signal heterogeneity without a suspicious lesion       CERVICAL AND VISUALIZED THORACIC CORD:  Normal signal within the visualized cord      PREVERTEBRAL AND PARASPINAL SOFT TISSUES:  Normal      VISUALIZED POSTERIOR FOSSA:  The visualized posterior fossa demonstrates no abnormal signal      CERVICAL DISC SPACES:     C2-3: Small broad-based central disc protrusion  No central canal narrowing  No neural foraminal stenosis      C3-4: Bilateral uncovertebral and facet hypertrophy  Mild central canal narrowing  Moderate to severe bilateral neural foraminal stenosis      C4-5: Minimal disc bulge with right facet hypertrophy  No central canal narrowing  Moderate to severe right neural foraminal stenosis      C5-6: Diffuse disc osteophyte complex with bilateral uncovertebral hypertrophy partially effacing the ventral subarachnoid space  Mild central canal narrowing  Moderate to severe right and moderate left neural foraminal stenosis      C6-7: Diffuse disc osteophyte complex with bilateral uncovertebral hypertrophy with a superimposed broad-based central disc protrusion  Moderate central canal narrowing  Moderate to severe bilateral neural foraminal stenosis      C7-T1: Mild disc bulge  No central canal narrowing    Mild bilateral neural foraminal stenosis      UPPER THORACIC DISC SPACES: Normal      OTHER FINDINGS:  None      IMPRESSION:     1  Mild reversal of the mid to lower cervical lordosis  The cervical vertebral body heights are maintained  Mild marrow signal heterogeneity without a suspicious lesion      2  Multilevel degenerative disc disease with diffuse disc osteophyte complexes at the C5-6 and C6-7 levels demonstrating moderate central canal narrowing at C6-7  Moderate to severe bilateral C3-4, right C4-5, right C5-C6 and bilateral C6-7 neural   foraminal narrowing - correlate with radiculopathy symptoms      3  The cervical cord appears normal in caliber and signal with no evidence of focal impingement   I have personally reviewed pertinent films in PACS and my interpretation is To level cervical spondylosis with foraminal stenosis

## 2023-03-10 ENCOUNTER — TELEPHONE (OUTPATIENT)
Dept: FAMILY MEDICINE CLINIC | Facility: HOSPITAL | Age: 78
End: 2023-03-10

## 2023-03-10 DIAGNOSIS — R50.9 FEVER, UNSPECIFIED FEVER CAUSE: Primary | ICD-10-CM

## 2023-03-10 RX ORDER — DOXYCYCLINE HYCLATE 100 MG/1
100 CAPSULE ORAL EVERY 12 HOURS SCHEDULED
Qty: 14 CAPSULE | Refills: 0 | Status: SHIPPED | OUTPATIENT
Start: 2023-03-10 | End: 2023-03-17

## 2023-03-10 NOTE — TELEPHONE ENCOUNTER
Pt c/o head and chest congestion, coughing up yellow mucus and fever of 102  States last time he had this ended up with pneumonia in the hospital  Took home covid test-negative  Asking what he should be taking or prescribed   PCB

## 2023-03-10 NOTE — PROGRESS NOTES
Established Patient Progress Note       Chief Complaint   Patient presents with   • Diabetes Type 2        History of Present Illness:     Judie Roman is a 69 y o  male with a history of T2DM since 5-6 years on metformin only w/o any microvascular complications with CAD s/p CABG with recent dx of MM on chemotherapy on Dex every 2 weeks for this who was seen inpatient for hyperglycemia management with HbA1C of 8 9%  When seen inpatient BG were controlled therefore recommend increasing metformin to max dose and recommended OP management  Hospital visit 02/06/23  Current chemotherapy plan:- Dexamethasone 20mg on day of chemotherapy, next session 03/31/2023, after this will get chemotherapy every 2 months  Blood Sugar/Glucometer/Pump/CGM review:    Checks BG 2x daily, Generally in 120-180 range, periodically specially on chemotherapy days when on dexamethasone BG in 200-300 range  Current regime:-   Metformin 1000mg BID  - Having some diarrhea since increasing dose from 1000mg daily to BID  Medications tried/failed in past:- None  Hypoglycemic episodes: None  Hyperglycemia:- Patient does report polyuria but is on lasix, denies any blurry vision, headaches  Does report some tingling in feet but attributes this to a reaction he has during his chemotherapy  Diet- patient tries to limit all sugar intake but wondering if can eat some once in a while, does not follow diabetic diet but mindful of eating  Activity- tries to stay active but not overtly  Weight- stable    last eye exam- 6 months ago, see's retina specialist but unsure where and doesn't recall name, discussed to please bring the name to us next visit so we can obtain records   Last exam on file 03/22  last foot exam - Done today 03/13/23  History of hypertension- Yes on toprol 100mg   History of hyperlipidemia- Yes on lipitor 40mg   Last lipid:- 02/23, LDL <100   Last urine microalbumina:- 02/23- normal   Last HbA1C 02/23:- 8 9%, previously 5-6% range       Social hx:- Does not smoke, drink alcohol or use any other drugs   Family hX:- father and brother have t2DM    Patient Active Problem List   Diagnosis   • Basal cell carcinoma of skin   • Coronary artery disease involving native heart with angina pectoris, unspecified vessel or lesion type ([place] [address] )   • Type 2 diabetes mellitus without complication, without long-term current use of insulin ([place] [address] )   • Dyslipidemia   • Erectile dysfunction   • Fatty liver   • Lymphoma ([place] [address] )   • Malignant tumor of cecum ([place] [address] )   • Multiple myeloma not having achieved remission ([place] [address] )   • Primary localized osteoarthritis of right knee   • Hx of CABG   • Essential hypertension   • S/P total knee arthroplasty, left   • Chronic diastolic congestive heart failure ([place] [address] )   • History of aortic valve replacement with bioprosthetic valve   • Leukocytosis   • Paroxysmal atrial fibrillation (HCC)   • Pharyngeal dysphagia   • Personal history of colon cancer   • Roberts's esophagus without dysplasia   • Schatzki's ring of distal esophagus   • Gastroesophageal reflux disease with esophagitis   • Hypokalemia   • Iron deficiency anemia   • Mouth ulcers   • Chest pain   • Glossitis   • CVID (common variable immunodeficiency) ([place] [address] )   • Fever   • Sepsis ([place] [address] )      Past Medical History:   Diagnosis Date   • Acute respiratory failure with hypoxia ([place] [address] ) 04/20/2022   • Arthritis    • Cancer ([place] [address] )    • Cataract    • Colitis    • Colon polyp    • Diabetes mellitus ([place] [address] )    • Fatty liver    • GERD (gastroesophageal reflux disease)    • History of chemotherapy    • History of radiation therapy    • History of shingles 2018   • History of transfusion    • Hyperlipidemia    • Hypertension    • 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[address] )       Past Surgical History:   Procedure Laterality Date   • AORTIC VALVE REPLACEMENT     • CARDIAC VALVE REPLACEMENT  2014    aorta   • CATARACT EXTRACTION Bilateral    • COLECTOMY     • COLONOSCOPY  11/2019    Prior right hemicolectomy   • CORONARY ARTERY BYPASS GRAFT     • DENTAL SURGERY     • JOINT REPLACEMENT  2019    left knee   • KNEE SURGERY     • LYMPH NODE BIOPSY  2003   • LYMPHADENECTOMY     • OTHER SURGICAL HISTORY      MAZE PROCEDURE   • OH ARTHRP KNE CONDYLE&PLATU MEDIAL&LAT COMPARTMENTS Left 2019    Procedure: ARTHROPLASTY LEFT KNEE TOTAL;  Surgeon: Felice Mcclellan MD;  Location:  MAIN OR;  Service: Orthopedics   • OH LARYNGOSCOPY DIRECT OPERATIVE W/BIOPSY Right 2022    Procedure: DIRECT LARYNGOSCOPY WITH BIOPSY RIGHT PHARYNX, POSSIBLE RIGHT NECK LYMPH NODE BIOPSY; FROZEN SECTION;  Surgeon: Carolyn Restrepo MD;  Location:  MAIN OR;  Service: ENT   • SKIN BIOPSY     • UPPER GASTROINTESTINAL ENDOSCOPY  2019    Schatzki ring   • US GUIDED LYMPH NODE BIOPSY RIGHT  2021      Family History   Problem Relation Age of Onset   • Heart block Family    • Coronary artery disease Mother    • Thrombosis Mother    • Hypertension Mother    • Arthritis Mother    • Hyperlipidemia Mother    • Diabetes Father    • Hypertension Father    • Arthritis Father    • Sudden death Father         cardiac   • Diabetes type II Father    • Colon cancer Paternal Grandfather    • Cancer Paternal Grandfather    • Breast cancer Sister    • Heart disease Brother         Coronary stents     Social History     Tobacco Use   • Smoking status: Former     Packs/day: 1 00     Years: 40 00     Pack years: 40 00     Types: Cigarettes     Start date: 47     Quit date: 2009     Years since quittin 2   • Smokeless tobacco: Never   Substance Use Topics   • Alcohol use: Yes     Alcohol/week: 2 0 standard drinks     Types: 2 Cans of beer per week     Comment: very rare "beer here and there"     Allergies   Allergen Reactions   • Ceftin [Cefuroxime] Itching     However, has tolerated Cefazolin and Pip-Tazo since, which have different side chains  Be cautious with / avoid 2nd, 3rd, or 4th Gen Cephs that have similar side chains to Cefuroxime     • Lantus [Insulin Glargine] Itching       Current Outpatient Medications:   •  aspirin 81 MG tablet, Take 1 tablet (81 mg total) by mouth daily, Disp: , Rfl:   •  atorvastatin (LIPITOR) 40 mg tablet, TAKE 1 TABLET BY MOUTH EVERY DAY, Disp: 90 tablet, Rfl: 3  •  bortezomib (VELCADE), Inject 2 5 mg under the skin see administration instructions Every 2 months, Disp: , Rfl:   •  cholecalciferol (VITAMIN D3) 1,000 units tablet, Take 2 tablets (2,000 Units total) by mouth daily, Disp: 60 tablet, Rfl: 0  •  Daratumumab-Hyaluronidase-fij (DARZALEX FASPRO SC), Inject 1,800 mg under the skin Twice a month, Disp: , Rfl:   •  dexamethasone (DECADRON) 4 mg tablet, Take 5 pills (20 mg) once a week on Thrus, Disp: , Rfl:   •  doxycycline hyclate (VIBRAMYCIN) 100 mg capsule, Take 1 capsule (100 mg total) by mouth every 12 (twelve) hours for 7 days, Disp: 14 capsule, Rfl: 0  •  Empagliflozin 25 MG TABS, Take 1 tablet (25 mg total) by mouth every morning, Disp: 90 tablet, Rfl: 1  •  ferrous sulfate 324 (65 Fe) mg, Take 1 tablet (324 mg total) by mouth daily with breakfast, Disp: 90 tablet, Rfl: 0  •  furosemide (LASIX) 20 mg tablet, TAKE 1 TABLET BY MOUTH TWICE A DAY, Disp: 180 tablet, Rfl: 0  •  IMMUNE GLOBULIN, HUMAN, IV, Inject 30,000 mg into a catheter in a vein, Disp: , Rfl:   •  metFORMIN (GLUCOPHAGE-XR) 500 mg 24 hr tablet, Take 3 tablets (1,500 mg total) by mouth daily with breakfast Take 3 pills in morning, Disp: 120 tablet, Rfl: 0  •  metoprolol succinate (TOPROL-XL) 100 mg 24 hr tablet, TAKE 1 TABLET BY MOUTH EVERY DAY, Disp: 90 tablet, Rfl: 1  •  multivitamin-minerals (CENTRUM ADULTS) tablet, Take 1 tablet by mouth daily, Disp:  , Rfl: 0  •  nitroglycerin (NITROSTAT) 0 3 mg SL tablet, Nitrostat 0 3 mg sublingual tablet  Place 1 tablet as needed by sublingual route as needed for 90 days  , Disp: , Rfl:   •  omeprazole (PriLOSEC) 40 MG capsule, TAKE 1 CAPSULE (40 MG TOTAL) BY MOUTH DAILY  , Disp: 90 capsule, Rfl: 2  • valACYclovir (VALTREX) 500 mg tablet, Take 500 mg by mouth daily, Disp: , Rfl:   •  dexamethasone, PF, (DECADRON) 10 mg/mL, 20 mg see administration instructions Twice a month (Patient not taking: Reported on 3/13/2023), Disp: , Rfl:   •  montelukast (SINGULAIR) 10 mg tablet, Take 10 mg by mouth daily (Patient not taking: Reported on 3/13/2023), Disp: , Rfl:     Review of Systems   Constitutional: Negative for diaphoresis, fatigue and unexpected weight change  Respiratory: Negative for shortness of breath  Cardiovascular: Negative for chest pain and palpitations  Gastrointestinal: Negative for constipation and diarrhea  Endocrine: Positive for polyuria (On lasix)  Negative for polydipsia  Physical Exam:  Body mass index is 22 51 kg/m²  /60   Pulse 100   Ht 5' 11" (1 803 m)   Wt 73 2 kg (161 lb 6 4 oz)   BMI 22 51 kg/m²    Wt Readings from Last 3 Encounters:   03/13/23 73 2 kg (161 lb 6 4 oz)   03/01/23 73 5 kg (162 lb)   02/16/23 74 7 kg (164 lb 9 6 oz)       Physical Exam  Constitutional:       Appearance: Normal appearance  Cardiovascular:      Rate and Rhythm: Normal rate and regular rhythm  Pulses: Normal pulses  no weak pulses          Dorsalis pedis pulses are 2+ on the right side and 2+ on the left side  Pulmonary:      Effort: Pulmonary effort is normal    Abdominal:      General: Abdomen is flat  Palpations: Abdomen is soft  Feet:      Right foot:      Skin integrity: No ulcer, skin breakdown, erythema, warmth, callus or dry skin  Left foot:      Skin integrity: No ulcer, skin breakdown, erythema, warmth, callus or dry skin  Skin:     General: Skin is warm  Capillary Refill: Capillary refill takes less than 2 seconds  Neurological:      General: No focal deficit present  Mental Status: He is alert  Patient's shoes and socks removed  Right Foot/Ankle   Right Foot Inspection  Skin Exam: skin normal and skin intact   No dry skin, no warmth, no callus, no erythema, no maceration, no abnormal color, no pre-ulcer, no ulcer and no callus  Toe Exam: ROM and strength within normal limits  Sensory   Vibration: diminished  Monofilament testing: intact    Vascular  Capillary refills: < 3 seconds  The right DP pulse is 2+  Left Foot/Ankle  Left Foot Inspection  Skin Exam: skin normal and skin intact  No dry skin, no warmth, no erythema, no maceration, normal color, no pre-ulcer, no ulcer and no callus  Toe Exam: ROM and strength within normal limits  Sensory   Vibration: diminished  Monofilament testing: intact    Vascular  Capillary refills: < 3 seconds  The left DP pulse is 2+       Assign Risk Category  No deformity present  No loss of protective sensation  No weak pulses  Risk: 0    Labs:      Latest Reference Range & Units Most Recent 06/25/20 10:55 12/30/20 09:04 05/13/21 05:03 07/13/21 08:20 01/17/22 06:46 04/19/22 05:50 07/27/22 09:45   Hemoglobin A1C 4 8 - 5 6 % 8 9 (H)  2/1/23 08:49 6 0 (H) 5 8 (H) 7 4 (H) 5 3 6 1 (H) 5 8 (H) 6 6 (H)   (H): Data is abnormally high     Latest Reference Range & Units 01/17/22 06:46 02/01/23 08:49   EXT Creatinine Urine Not Estab  mg/dL 117 5 59 1   MICROALBUMIN/CREATININE RATIO 0 - 29 mg/g creat 4 23   MICROALBUM ,U,RANDOM Not Estab  ug/mL 4 5 13 7        Latest Reference Range & Units 01/17/22 06:46 02/01/23 08:49   Cholesterol 100 - 199 mg/dL 103 113   Triglycerides 0 - 149 mg/dL 117 110   HDL >39 mg/dL 27 (L) 35 (L)   LDL Calculated 0 - 99 mg/dL 54 58   VLDL Cholesterol Shaw 5 - 40 mg/dL 22 20   (L): Data is abnormally low   Latest Reference Range & Units 02/05/23 19:37 02/06/23 04:46 02/07/23 04:47   eGFR ml/min/1 73sq m 83 86 83      04/14/18 08:44 07/26/19 11:26 03/18/22 11:51   Left Eye Diabetic Retinopathy None (E) None (E) None (E)   (E): External lab result   04/14/18 08:44 07/26/19 11:26 03/18/22 11:51   Right Eye Diabetic Retinopathy None (E) None (E) None (E)   (E): External lab result  Impression & Plan:    Problem List Items Addressed This Visit        Endocrine    Type 2 diabetes mellitus without complication, without long-term current use of insulin (HCC) - Primary (Chronic)    Relevant Medications    Empagliflozin 25 MG TABS    metFORMIN (GLUCOPHAGE-XR) 500 mg 24 hr tablet    Other Relevant Orders    Comprehensive metabolic panel    Hemoglobin A1C    Microalbumin / creatinine urine ratio    Lipid panel       Orders Placed This Encounter   Procedures   • Comprehensive metabolic panel     This is a patient instruction: Patient fasting for 8 hours or longer recommended  Standing Status:   Future     Number of Occurrences:   1     Standing Expiration Date:   3/13/2024   • Hemoglobin A1C     Standing Status:   Future     Number of Occurrences:   1     Standing Expiration Date:   3/13/2024   • Microalbumin / creatinine urine ratio     Standing Status:   Future     Number of Occurrences:   1     Standing Expiration Date:   3/13/2024   • Lipid panel     This is a patient instruction: This test requires patient fasting for 10-12 hours or longer  Drinking of black coffee or black tea is acceptable  Standing Status:   Future     Number of Occurrences:   1     Standing Expiration Date:   3/13/2024       There are no Patient Instructions on file for this visit  Patient is a 75yM with PMHx of well controlled T2DM since 6 years on metformin only with complication on CAD s/p CABG in 2014 with dx of multiple myeloma on chemotherapy with steriod induced hyperglycemia with other PMHx of hypertension, hyperlipidemia who presents today for Diabetes management  1) T2DM:- Patient's diabetes was well controlled previously with HbA1C in 5-6% range with metformin alone with raise in HbA1C to 8 9%, Discussed that current BG pattern shows stable BG otherwise but elevated BG only on the day patient gets his dexamethasone for chemotherapy which now will be less often- every 2 months   Given his side effect of diarrhea with full dose of metformin and since has CVD recommend reducing metformin to 1500mg daily from 2000mg daily and start jardiance 25mg daily, Discussed needs to stay hydrated while on jardiance and lasix to prevent dehydration  Discussed ok to check BG once a day- alternate fasting, premeals   Discussed ok to have some cheat days  But eats carbohydrates in moderation     Plan   - Metformin 1500mg daily   - start jardiance 25mg daily   - Check BG once a day  - offered CGM but patient not interested   - Repeat HbA1C in 2 months     Screening  - Retinopathy- uptodate, discussed to please get record next visit   - Lipid- uptodate 02/23, LDL <100, c/w Lipitor 20mg daily, repeat next visit  - Urine microalbumin- Uptodate 02/23, normal, repeat next visit    2) Hyperlipidemia:-  uptodate 02/23, LDL <100, c/w Lipitor 20mg daily, repeat next visit  3) Hypertension:- BP in clinic controlled at 104/60, c/w toprol     Discussed with the patient and all questioned fully answered  He will call me if any problems arise  Follow-up appointment in 2 months       Counseled patient on diagnostic results, prognosis, risk and benefit of treatment options, instruction for management, importance of treatment compliance, Risk  factor reduction and impressions      Shelli Goss MD

## 2023-03-10 NOTE — TELEPHONE ENCOUNTER
Spoke to pt, states symptoms started late Wednesday into Thursday of this week  He is concerned about developing pneumonia again  Started taking coricidin and it seems to help, but he wakes up in the middle of the night with the feeling of he can't breathe  Please advise

## 2023-03-12 DIAGNOSIS — I50.32 CHRONIC DIASTOLIC CONGESTIVE HEART FAILURE (HCC): ICD-10-CM

## 2023-03-12 DIAGNOSIS — I10 ESSENTIAL HYPERTENSION: ICD-10-CM

## 2023-03-12 DIAGNOSIS — I48.0 PAROXYSMAL ATRIAL FIBRILLATION (HCC): ICD-10-CM

## 2023-03-12 DIAGNOSIS — I25.10 CORONARY ARTERY DISEASE INVOLVING NATIVE CORONARY ARTERY OF NATIVE HEART WITHOUT ANGINA PECTORIS: ICD-10-CM

## 2023-03-12 RX ORDER — METOPROLOL SUCCINATE 100 MG/1
TABLET, EXTENDED RELEASE ORAL
Qty: 90 TABLET | Refills: 1 | Status: ON HOLD | OUTPATIENT
Start: 2023-03-12

## 2023-03-13 ENCOUNTER — OFFICE VISIT (OUTPATIENT)
Dept: ENDOCRINOLOGY | Facility: HOSPITAL | Age: 78
End: 2023-03-13

## 2023-03-13 VITALS
SYSTOLIC BLOOD PRESSURE: 104 MMHG | HEART RATE: 100 BPM | DIASTOLIC BLOOD PRESSURE: 60 MMHG | HEIGHT: 71 IN | WEIGHT: 161.4 LBS | BODY MASS INDEX: 22.6 KG/M2

## 2023-03-13 DIAGNOSIS — E11.9 TYPE 2 DIABETES MELLITUS WITHOUT COMPLICATION, WITHOUT LONG-TERM CURRENT USE OF INSULIN (HCC): Primary | Chronic | ICD-10-CM

## 2023-03-13 RX ORDER — METFORMIN HYDROCHLORIDE 500 MG/1
1500 TABLET, EXTENDED RELEASE ORAL
Qty: 120 TABLET | Refills: 0 | Status: ON HOLD | OUTPATIENT
Start: 2023-03-13

## 2023-03-20 ENCOUNTER — HOSPITAL ENCOUNTER (OUTPATIENT)
Dept: RADIOLOGY | Facility: CLINIC | Age: 78
Discharge: HOME | End: 2023-03-20
Admitting: ANESTHESIOLOGY

## 2023-03-20 VITALS
HEART RATE: 88 BPM | OXYGEN SATURATION: 94 % | DIASTOLIC BLOOD PRESSURE: 58 MMHG | RESPIRATION RATE: 20 BRPM | SYSTOLIC BLOOD PRESSURE: 106 MMHG | TEMPERATURE: 97.7 F

## 2023-03-20 DIAGNOSIS — M48.02 FORAMINAL STENOSIS OF CERVICAL REGION: ICD-10-CM

## 2023-03-20 DIAGNOSIS — M54.12 CERVICAL RADICULOPATHY: ICD-10-CM

## 2023-03-20 PROCEDURE — 3E0R33Z INTRODUCTION OF ANTI-INFLAMMATORY INTO SPINAL CANAL, PERCUTANEOUS APPROACH: ICD-10-PCS | Performed by: ANESTHESIOLOGY

## 2023-03-20 RX ORDER — METHYLPREDNISOLONE ACETATE 80 MG/ML
80 INJECTION, SUSPENSION INTRA-ARTICULAR; INTRALESIONAL; INTRAMUSCULAR; PARENTERAL; SOFT TISSUE ONCE
Status: COMPLETED | OUTPATIENT
Start: 2023-03-20 | End: 2023-03-20

## 2023-03-20 RX ADMIN — METHYLPREDNISOLONE ACETATE 80 MG: 80 INJECTION, SUSPENSION INTRA-ARTICULAR; INTRALESIONAL; INTRAMUSCULAR; SOFT TISSUE at 10:13

## 2023-03-20 RX ADMIN — IOHEXOL 1 ML: 300 INJECTION, SOLUTION INTRAVENOUS at 10:13

## 2023-03-20 NOTE — H&P
History of Present Illness: The patient is a 69 y o  male who presents with complaints of neck and arm pain      Past Medical History:   Diagnosis Date   • Acute respiratory failure with hypoxia ([facility] ) 04/20/2022   • Arthritis    • Cancer [facility])    • Cataract    • Colitis    • Colon polyp    • [facility]    • Fatty liver    • GERD (gastroesophageal reflux disease)    • History of chemotherapy    • History of radiation therapy    • History of shingles 2018   • History of transfusion    • Hyperlipidemia    • Hypertension    • 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[facility] )        Past Surgical History:   Procedure Laterality Date   • AORTIC VALVE REPLACEMENT     • CARDIAC VALVE REPLACEMENT  2014    aorta   • CATARACT EXTRACTION Bilateral    • COLECTOMY     • COLONOSCOPY  11/2019    Prior right hemicolectomy   • CORONARY ARTERY BYPASS GRAFT     • DENTAL SURGERY     • JOINT REPLACEMENT  January 2019    left knee   • KNEE SURGERY     • LYMPH NODE BIOPSY  2003   • LYMPHADENECTOMY     • OTHER SURGICAL HISTORY      MAZE PROCEDURE   • MT ARTHRP KNE CONDYLE&PLATU MEDIAL&LAT COMPARTMENTS Left 01/28/2019    Procedure: ARTHROPLASTY LEFT KNEE TOTAL;  Surgeon: Karmen Pendleton MD;  Location:  MAIN OR;  Service: Orthopedics   • MT LARYNGOSCOPY DIRECT OPERATIVE W/BIOPSY Right 04/27/2022    Procedure: DIRECT LARYNGOSCOPY WITH BIOPSY RIGHT PHARYNX, POSSIBLE RIGHT NECK LYMPH NODE BIOPSY; FROZEN SECTION;  Surgeon: Magdaleno Ferrari MD;  Location:  MAIN OR;  Service: ENT   • SKIN BIOPSY     • UPPER GASTROINTESTINAL ENDOSCOPY  11/2019    Schatzki ring   • US GUIDED LYMPH NODE BIOPSY RIGHT  12/14/2021         Current Outpatient Medications:   •  aspirin 81 MG tablet, Take 1 tablet (81 mg total) by mouth daily, Disp: , Rfl:   •  atorvastatin (LIPITOR) 40 mg tablet, TAKE 1 TABLET BY MOUTH EVERY DAY, Disp: 90 tablet, Rfl: 3  •  bortezomib (VELCADE), Inject 2 5 mg under the skin see administration instructions Every 2 months, Disp: , Rfl:   •  cholecalciferol (VITAMIN D3) 1,000 units tablet, Take 2 tablets (2,000 Units total) by mouth daily, Disp: 60 tablet, Rfl: 0  •  Daratumumab-Hyaluronidase-fij (DARZALEX FASPRO SC), Inject 1,800 mg under the skin Twice a month, Disp: , Rfl:   •  dexamethasone (DECADRON) 4 mg tablet, Take 5 pills (20 mg) once a week on Thrus, Disp: , Rfl:   •  dexamethasone, PF, (DECADRON) 10 mg/mL, 20 mg see administration instructions Twice a month (Patient not taking: Reported on 3/13/2023), Disp: , Rfl:   •  Empagliflozin 25 MG TABS, Take 1 tablet (25 mg total) by mouth every morning, Disp: 90 tablet, Rfl: 1  •  ferrous sulfate 324 (65 Fe) mg, Take 1 tablet (324 mg total) by mouth daily with breakfast, Disp: 90 tablet, Rfl: 0  •  furosemide (LASIX) 20 mg tablet, TAKE 1 TABLET BY MOUTH TWICE A DAY, Disp: 180 tablet, Rfl: 0  •  IMMUNE GLOBULIN, HUMAN, IV, Inject 30,000 mg into a catheter in a vein, Disp: , Rfl:   •  metFORMIN (GLUCOPHAGE-XR) 500 mg 24 hr tablet, Take 3 tablets (1,500 mg total) by mouth daily with breakfast Take 3 pills in morning, Disp: 120 tablet, Rfl: 0  •  metoprolol succinate (TOPROL-XL) 100 mg 24 hr tablet, TAKE 1 TABLET BY MOUTH EVERY DAY, Disp: 90 tablet, Rfl: 1  •  montelukast (SINGULAIR) 10 mg tablet, Take 10 mg by mouth daily (Patient not taking: Reported on 3/13/2023), Disp: , Rfl:   •  multivitamin-minerals (CENTRUM ADULTS) tablet, Take 1 tablet by mouth daily, Disp:  , Rfl: 0  •  nitroglycerin (NITROSTAT) 0 3 mg SL tablet, Nitrostat 0 3 mg sublingual tablet  Place 1 tablet as needed by sublingual route as needed for 90 days  , Disp: , Rfl:   •  omeprazole (PriLOSEC) 40 MG capsule, TAKE 1 CAPSULE (40 MG TOTAL) BY MOUTH DAILY  , Disp: 90 capsule, Rfl: 2  •  valACYclovir (VALTREX) 500 mg tablet, Take 500 mg by mouth daily, Disp: , Rfl:     Current Facility-Administered Medications:   •  iohexol (OMNIPAQUE) 300 mg/mL injection 50 mL, 50 mL, Epidural, Once, Charles Horowitz DO  •  methylPREDNISolone acetate (DEPO-MEDROL) injection 80 mg, 80 mg, Epidural, Once, Charles Horowitz,     Allergies   Allergen Reactions   • Ceftin [Cefuroxime] Itching     However, has tolerated Cefazolin and Pip-Tazo since, which have different side chains  Be cautious with / avoid 2nd, 3rd, or 4th Gen Cephs that have similar side chains to Cefuroxime  • Lantus [Insulin Glargine] Itching       Physical Exam:   General: Awake, Alert, Oriented x 3, Mood and affect appropriate  Respiratory: Respirations even and unlabored  Cardiovascular: Peripheral pulses intact; no edema  Musculoskeletal Exam: decreased range of motion cervical spine    ASA Score: II         Assessment:   1  Foraminal stenosis of cervical region    2   Cervical radiculopathy - Right        Plan: TAARH to the right 1

## 2023-03-20 NOTE — DISCHARGE INSTRUCTIONS
Epidural Steroid Injection   WHAT YOU NEED TO KNOW:   An epidural steroid injection (BRITT) is a procedure to inject steroid medicine into the epidural space  The epidural space is between your spinal cord and vertebrae  Steroids reduce inflammation and fluid buildup in your spine that may be causing pain  You may be given pain medicine along with the steroids  ACTIVITY  Do not drive or operate machinery today  No strenuous activity today - bending, lifting, etc   You may resume normal activites starting tomorrow - start slowly and as tolerated  You may shower today, but no tub baths or hot tubs  You may have numbness for several hours from the local anesthetic  Please use caution and common sense, especially with weight-bearing activities  CARE OF THE INJECTION SITE  If you have soreness or pain, apply ice to the area today (20 minutes on/20 minutes off)  Starting tomorrow, you may use warm, moist heat or ice if needed  You may have an increase or change in your discomfort for 36-48 hours after your treatment  Apply ice and continue with any pain medication you have been prescribed  Notify the [facility] if you have any of the following: redness, drainage, swelling, headache, stiff neck or fever above 100°F     SPECIAL INSTRUCTIONS  Our office will contact you in approximately 7 days for a progress report  MEDICATIONS  Continue to take all routine medications  Our office may have instructed you to hold some medications  As no general anesthesia was used in today's procedure, you should not experience any side effects related to anesthesia  If you are diabetic, the steroids used in today's injection may temporarily increase your blood sugar levels after the first few days after your injection  Please keep a close eye on your sugars and alert the doctor who manages your diabetes if your sugars are significantly high from your baseline or you are symptomatic       If you have a problem specifically related to your procedure, please call our office at [phone]  Problems not related to your procedure should be directed to your primary care physician

## 2023-03-23 ENCOUNTER — HOSPITAL ENCOUNTER (INPATIENT)
Facility: HOSPITAL | Age: 78
LOS: 6 days | Discharge: HOME | End: 2023-03-29
Attending: EMERGENCY MEDICINE | Admitting: STUDENT IN AN ORGANIZED HEALTH CARE EDUCATION/TRAINING PROGRAM

## 2023-03-23 ENCOUNTER — APPOINTMENT (EMERGENCY)
Dept: RADIOLOGY | Facility: HOSPITAL | Age: 78
End: 2023-03-23

## 2023-03-23 ENCOUNTER — APPOINTMENT (EMERGENCY)
Dept: CT IMAGING | Facility: HOSPITAL | Age: 78
End: 2023-03-23
Attending: EMERGENCY MEDICINE

## 2023-03-23 DIAGNOSIS — J18.9 PNEUMONIA OF RIGHT LOWER LOBE DUE TO INFECTIOUS ORGANISM: ICD-10-CM

## 2023-03-23 DIAGNOSIS — M54.12 CERVICAL RADICULOPATHY: ICD-10-CM

## 2023-03-23 DIAGNOSIS — C90.00 MULTIPLE MYELOMA NOT HAVING ACHIEVED REMISSION (HCC): ICD-10-CM

## 2023-03-23 DIAGNOSIS — E87.6 HYPOKALEMIA: ICD-10-CM

## 2023-03-23 DIAGNOSIS — R77.8 ELEVATED TROPONIN: ICD-10-CM

## 2023-03-23 DIAGNOSIS — J18.9 PNEUMONIA: ICD-10-CM

## 2023-03-23 DIAGNOSIS — J96.01 ACUTE RESPIRATORY FAILURE WITH HYPOXIA (HCC): Primary | ICD-10-CM

## 2023-03-23 DIAGNOSIS — R06.89 ACUTE RESPIRATORY INSUFFICIENCY: ICD-10-CM

## 2023-03-23 DIAGNOSIS — Z78.9 FAILURE OF OUTPATIENT TREATMENT: ICD-10-CM

## 2023-03-23 DIAGNOSIS — A41.9 SEVERE SEPSIS (HCC): ICD-10-CM

## 2023-03-23 DIAGNOSIS — R65.20 SEVERE SEPSIS (HCC): ICD-10-CM

## 2023-03-23 LAB
2HR DELTA HS TROPONIN: 30 NG/L
4HR DELTA HS TROPONIN: 20 NG/L
ALBUMIN SERPL BCP-MCNC: 3.8 G/DL (ref 3.5–5)
ALP SERPL-CCNC: 88 U/L (ref 34–104)
ALT SERPL W P-5'-P-CCNC: 9 U/L (ref 7–52)
ANION GAP SERPL CALCULATED.3IONS-SCNC: 12 MMOL/L (ref 4–13)
APTT PPP: 27 SECONDS (ref 23–37)
AST SERPL W P-5'-P-CCNC: 13 U/L (ref 13–39)
B PARAP IS1001 DNA NPH QL NAA+NON-PROBE: NOT DETECTED
B PERT.PT PRMT NPH QL NAA+NON-PROBE: NOT DETECTED
BASOPHILS # BLD AUTO: 0.13 THOUSANDS/ÂΜL (ref 0–0.1)
BASOPHILS NFR BLD AUTO: 1 % (ref 0–1)
BILIRUB SERPL-MCNC: 0.5 MG/DL (ref 0.2–1)
BILIRUB UR QL STRIP: NEGATIVE
BNP SERPL-MCNC: 326 PG/ML (ref 0–100)
BUN SERPL-MCNC: 17 MG/DL (ref 5–25)
C PNEUM DNA NPH QL NAA+NON-PROBE: NOT DETECTED
CALCIUM SERPL-MCNC: 9 MG/DL (ref 8.4–10.2)
CARDIAC TROPONIN I PNL SERPL HS: 102 NG/L
CARDIAC TROPONIN I PNL SERPL HS: 112 NG/L
CARDIAC TROPONIN I PNL SERPL HS: 82 NG/L
CHLORIDE SERPL-SCNC: 99 MMOL/L (ref 96–108)
CLARITY UR: CLEAR
CO2 SERPL-SCNC: 28 MMOL/L (ref 21–32)
COLOR UR: YELLOW
CREAT SERPL-MCNC: 0.91 MG/DL (ref 0.6–1.3)
D DIMER PPP FEU-MCNC: 1.38 UG/ML FEU
EOSINOPHIL # BLD AUTO: 0.63 THOUSAND/ÂΜL (ref 0–0.61)
EOSINOPHIL NFR BLD AUTO: 4 % (ref 0–6)
ERYTHROCYTE [DISTWIDTH] IN BLOOD BY AUTOMATED COUNT: 14.6 % (ref 11.6–15.1)
FLUAV RNA NPH QL NAA+NON-PROBE: NOT DETECTED
FLUAV RNA RESP QL NAA+PROBE: NEGATIVE
FLUBV RNA NPH QL NAA+NON-PROBE: NOT DETECTED
FLUBV RNA RESP QL NAA+PROBE: NEGATIVE
GFR SERPL CREATININE-BSD FRML MDRD: 81 ML/MIN/1.73SQ M
GLUCOSE SERPL-MCNC: 125 MG/DL (ref 65–140)
GLUCOSE SERPL-MCNC: 133 MG/DL (ref 65–140)
GLUCOSE SERPL-MCNC: 186 MG/DL (ref 65–140)
GLUCOSE UR STRIP-MCNC: ABNORMAL MG/DL
HADV DNA NPH QL NAA+NON-PROBE: NOT DETECTED
HCOV 229E RNA NPH QL NAA+NON-PROBE: NOT DETECTED
HCOV HKU1 RNA NPH QL NAA+NON-PROBE: NOT DETECTED
HCOV NL63 RNA NPH QL NAA+NON-PROBE: NOT DETECTED
HCOV OC43 RNA NPH QL NAA+NON-PROBE: NOT DETECTED
HCT VFR BLD AUTO: 37.4 % (ref 36.5–49.3)
HGB BLD-MCNC: 12.2 G/DL (ref 12–17)
HGB UR QL STRIP.AUTO: NEGATIVE
HMPV RNA NPH QL NAA+NON-PROBE: NOT DETECTED
HPIV1 RNA NPH QL NAA+NON-PROBE: NOT DETECTED
HPIV2 RNA NPH QL NAA+NON-PROBE: NOT DETECTED
HPIV3 RNA NPH QL NAA+NON-PROBE: NOT DETECTED
HPIV4 RNA NPH QL NAA+NON-PROBE: NOT DETECTED
IMM GRANULOCYTES # BLD AUTO: 0.11 THOUSAND/UL (ref 0–0.2)
IMM GRANULOCYTES NFR BLD AUTO: 1 % (ref 0–2)
INR PPP: 0.98 (ref 0.84–1.19)
KETONES UR STRIP-MCNC: NEGATIVE MG/DL
L PNEUMO1 AG UR QL IA.RAPID: NEGATIVE
LACTATE SERPL-SCNC: 1.4 MMOL/L (ref 0.5–2)
LACTATE SERPL-SCNC: 2.3 MMOL/L (ref 0.5–2)
LEUKOCYTE ESTERASE UR QL STRIP: NEGATIVE
LYMPHOCYTES # BLD AUTO: 1.5 THOUSANDS/ÂΜL (ref 0.6–4.47)
LYMPHOCYTES NFR BLD AUTO: 8 % (ref 14–44)
M PNEUMO DNA NPH QL NAA+NON-PROBE: NOT DETECTED
MCH RBC QN AUTO: 28.8 PG (ref 26.8–34.3)
MCHC RBC AUTO-ENTMCNC: 32.6 G/DL (ref 31.4–37.4)
MCV RBC AUTO: 88 FL (ref 82–98)
MONOCYTES # BLD AUTO: 1.48 THOUSAND/ÂΜL (ref 0.17–1.22)
MONOCYTES NFR BLD AUTO: 8 % (ref 4–12)
NEUTROPHILS # BLD AUTO: 14.09 THOUSANDS/ÂΜL (ref 1.85–7.62)
NEUTS SEG NFR BLD AUTO: 78 % (ref 43–75)
NITRITE UR QL STRIP: NEGATIVE
NRBC BLD AUTO-RTO: 0 /100 WBCS
PH UR STRIP.AUTO: 5 [PH]
PLATELET # BLD AUTO: 308 THOUSANDS/UL (ref 149–390)
PMV BLD AUTO: 9.7 FL (ref 8.9–12.7)
POTASSIUM SERPL-SCNC: 3 MMOL/L (ref 3.5–5.3)
PROCALCITONIN SERPL-MCNC: 0.19 NG/ML
PROT SERPL-MCNC: 6.6 G/DL (ref 6.4–8.4)
PROT UR STRIP-MCNC: NEGATIVE MG/DL
PROTHROMBIN TIME: 13.7 SECONDS (ref 11.6–14.5)
RBC # BLD AUTO: 4.24 MILLION/UL (ref 3.88–5.62)
RSV RNA NPH QL NAA+NON-PROBE: NOT DETECTED
RSV RNA RESP QL NAA+PROBE: NEGATIVE
RV+EV RNA NPH QL NAA+NON-PROBE: NOT DETECTED
S PNEUM AG UR QL: NEGATIVE
SARS-COV-2 RNA NPH QL NAA+NON-PROBE: NOT DETECTED
SARS-COV-2 RNA RESP QL NAA+PROBE: NEGATIVE
SODIUM SERPL-SCNC: 139 MMOL/L (ref 135–147)
SP GR UR STRIP.AUTO: <1.005 (ref 1–1.03)
TSH SERPL DL<=0.05 MIU/L-ACNC: 7.17 UIU/ML (ref 0.45–4.5)
UROBILINOGEN UR STRIP-ACNC: <2 MG/DL
WBC # BLD AUTO: 17.94 THOUSAND/UL (ref 4.31–10.16)

## 2023-03-23 RX ORDER — POTASSIUM CHLORIDE 20 MEQ/1
40 TABLET, EXTENDED RELEASE ORAL ONCE
Status: COMPLETED | OUTPATIENT
Start: 2023-03-23 | End: 2023-03-23

## 2023-03-23 RX ORDER — DIPHENHYDRAMINE HYDROCHLORIDE 50 MG/ML
25 INJECTION INTRAMUSCULAR; INTRAVENOUS EVERY 6 HOURS PRN
Status: DISCONTINUED | OUTPATIENT
Start: 2023-03-23 | End: 2023-03-29 | Stop reason: HOSPADM

## 2023-03-23 RX ORDER — ENOXAPARIN SODIUM 100 MG/ML
40 INJECTION SUBCUTANEOUS DAILY
Status: DISCONTINUED | OUTPATIENT
Start: 2023-03-23 | End: 2023-03-29 | Stop reason: HOSPADM

## 2023-03-23 RX ORDER — VALACYCLOVIR HYDROCHLORIDE 500 MG/1
500 TABLET, FILM COATED ORAL DAILY
Status: DISCONTINUED | OUTPATIENT
Start: 2023-03-23 | End: 2023-03-29 | Stop reason: HOSPADM

## 2023-03-23 RX ORDER — CEFEPIME HYDROCHLORIDE 2 G/50ML
2000 INJECTION, SOLUTION INTRAVENOUS EVERY 8 HOURS
Status: DISCONTINUED | OUTPATIENT
Start: 2023-03-23 | End: 2023-03-29 | Stop reason: HOSPADM

## 2023-03-23 RX ORDER — POLYETHYLENE GLYCOL 3350 17 G/17G
17 POWDER, FOR SOLUTION ORAL DAILY PRN
Status: DISCONTINUED | OUTPATIENT
Start: 2023-03-23 | End: 2023-03-29 | Stop reason: HOSPADM

## 2023-03-23 RX ORDER — ATORVASTATIN CALCIUM 40 MG/1
40 TABLET, FILM COATED ORAL
Status: DISCONTINUED | OUTPATIENT
Start: 2023-03-23 | End: 2023-03-29 | Stop reason: HOSPADM

## 2023-03-23 RX ORDER — INSULIN LISPRO 100 [IU]/ML
1-5 INJECTION, SOLUTION INTRAVENOUS; SUBCUTANEOUS
Status: DISCONTINUED | OUTPATIENT
Start: 2023-03-23 | End: 2023-03-29 | Stop reason: HOSPADM

## 2023-03-23 RX ORDER — BENZONATATE 100 MG/1
100 CAPSULE ORAL 3 TIMES DAILY PRN
Status: DISCONTINUED | OUTPATIENT
Start: 2023-03-23 | End: 2023-03-29 | Stop reason: HOSPADM

## 2023-03-23 RX ORDER — CEFEPIME HYDROCHLORIDE 2 G/50ML
2000 INJECTION, SOLUTION INTRAVENOUS ONCE
Status: COMPLETED | OUTPATIENT
Start: 2023-03-23 | End: 2023-03-23

## 2023-03-23 RX ORDER — MAGNESIUM HYDROXIDE/ALUMINUM HYDROXICE/SIMETHICONE 120; 1200; 1200 MG/30ML; MG/30ML; MG/30ML
30 SUSPENSION ORAL EVERY 4 HOURS PRN
Status: DISCONTINUED | OUTPATIENT
Start: 2023-03-23 | End: 2023-03-29 | Stop reason: HOSPADM

## 2023-03-23 RX ORDER — POTASSIUM CHLORIDE 20 MEQ/1
40 TABLET, EXTENDED RELEASE ORAL
Status: DISCONTINUED | OUTPATIENT
Start: 2023-03-23 | End: 2023-03-24

## 2023-03-23 RX ORDER — GUAIFENESIN 600 MG/1
600 TABLET, EXTENDED RELEASE ORAL EVERY 12 HOURS SCHEDULED
Status: DISCONTINUED | OUTPATIENT
Start: 2023-03-23 | End: 2023-03-29 | Stop reason: HOSPADM

## 2023-03-23 RX ORDER — METOPROLOL SUCCINATE 50 MG/1
100 TABLET, EXTENDED RELEASE ORAL DAILY
Status: DISCONTINUED | OUTPATIENT
Start: 2023-03-23 | End: 2023-03-29 | Stop reason: HOSPADM

## 2023-03-23 RX ORDER — ACETAMINOPHEN 325 MG/1
650 TABLET ORAL EVERY 6 HOURS PRN
Status: DISCONTINUED | OUTPATIENT
Start: 2023-03-23 | End: 2023-03-29 | Stop reason: HOSPADM

## 2023-03-23 RX ORDER — ASPIRIN 81 MG/1
324 TABLET, CHEWABLE ORAL ONCE
Status: COMPLETED | OUTPATIENT
Start: 2023-03-23 | End: 2023-03-23

## 2023-03-23 RX ORDER — PANTOPRAZOLE SODIUM 40 MG/1
40 TABLET, DELAYED RELEASE ORAL
Status: DISCONTINUED | OUTPATIENT
Start: 2023-03-23 | End: 2023-03-29 | Stop reason: HOSPADM

## 2023-03-23 RX ORDER — SODIUM CHLORIDE, SODIUM GLUCONATE, SODIUM ACETATE, POTASSIUM CHLORIDE, MAGNESIUM CHLORIDE, SODIUM PHOSPHATE, DIBASIC, AND POTASSIUM PHOSPHATE .53; .5; .37; .037; .03; .012; .00082 G/100ML; G/100ML; G/100ML; G/100ML; G/100ML; G/100ML; G/100ML
50 INJECTION, SOLUTION INTRAVENOUS CONTINUOUS
Status: DISCONTINUED | OUTPATIENT
Start: 2023-03-23 | End: 2023-03-24

## 2023-03-23 RX ORDER — ASPIRIN 81 MG/1
81 TABLET ORAL DAILY
Status: DISCONTINUED | OUTPATIENT
Start: 2023-03-23 | End: 2023-03-29 | Stop reason: HOSPADM

## 2023-03-23 RX ADMIN — ALUMINUM HYDROXIDE, MAGNESIUM HYDROXIDE, AND SIMETHICONE 30 ML: 200; 200; 20 SUSPENSION ORAL at 12:56

## 2023-03-23 RX ADMIN — GUAIFENESIN 600 MG: 600 TABLET ORAL at 19:31

## 2023-03-23 RX ADMIN — SODIUM CHLORIDE, SODIUM GLUCONATE, SODIUM ACETATE, POTASSIUM CHLORIDE, MAGNESIUM CHLORIDE, SODIUM PHOSPHATE, DIBASIC, AND POTASSIUM PHOSPHATE 50 ML/HR: .53; .5; .37; .037; .03; .012; .00082 INJECTION, SOLUTION INTRAVENOUS at 12:39

## 2023-03-23 RX ADMIN — BENZONATATE 100 MG: 100 CAPSULE ORAL at 12:55

## 2023-03-23 RX ADMIN — VANCOMYCIN HYDROCHLORIDE 750 MG: 750 INJECTION, SOLUTION INTRAVENOUS at 19:31

## 2023-03-23 RX ADMIN — SODIUM CHLORIDE 500 ML: 0.9 INJECTION, SOLUTION INTRAVENOUS at 08:12

## 2023-03-23 RX ADMIN — CEFEPIME HYDROCHLORIDE 2000 MG: 2 INJECTION, SOLUTION INTRAVENOUS at 23:34

## 2023-03-23 RX ADMIN — ENOXAPARIN SODIUM 40 MG: 100 INJECTION SUBCUTANEOUS at 12:56

## 2023-03-23 RX ADMIN — IOHEXOL 85 ML: 350 INJECTION, SOLUTION INTRAVENOUS at 09:22

## 2023-03-23 RX ADMIN — CEFEPIME HYDROCHLORIDE 2000 MG: 2 INJECTION, SOLUTION INTRAVENOUS at 08:16

## 2023-03-23 RX ADMIN — GUAIFENESIN 600 MG: 600 TABLET ORAL at 12:54

## 2023-03-23 RX ADMIN — CEFEPIME HYDROCHLORIDE 2000 MG: 2 INJECTION, SOLUTION INTRAVENOUS at 16:02

## 2023-03-23 RX ADMIN — VANCOMYCIN HYDROCHLORIDE 1750 MG: 1 INJECTION, POWDER, LYOPHILIZED, FOR SOLUTION INTRAVENOUS at 08:37

## 2023-03-23 RX ADMIN — DIPHENHYDRAMINE HYDROCHLORIDE 25 MG: 50 INJECTION, SOLUTION INTRAMUSCULAR; INTRAVENOUS at 12:56

## 2023-03-23 RX ADMIN — PANTOPRAZOLE SODIUM 40 MG: 40 TABLET, DELAYED RELEASE ORAL at 13:05

## 2023-03-23 RX ADMIN — METOPROLOL SUCCINATE 100 MG: 50 TABLET, EXTENDED RELEASE ORAL at 12:55

## 2023-03-23 RX ADMIN — VALACYCLOVIR HYDROCHLORIDE 500 MG: 500 TABLET, FILM COATED ORAL at 12:55

## 2023-03-23 RX ADMIN — ASPIRIN 81 MG CHEWABLE TABLET 324 MG: 81 TABLET CHEWABLE at 10:57

## 2023-03-23 RX ADMIN — POTASSIUM CHLORIDE 40 MEQ: 1500 TABLET, EXTENDED RELEASE ORAL at 10:57

## 2023-03-23 RX ADMIN — SODIUM CHLORIDE, SODIUM GLUCONATE, SODIUM ACETATE, POTASSIUM CHLORIDE, MAGNESIUM CHLORIDE, SODIUM PHOSPHATE, DIBASIC, AND POTASSIUM PHOSPHATE 50 ML/HR: .53; .5; .37; .037; .03; .012; .00082 INJECTION, SOLUTION INTRAVENOUS at 18:37

## 2023-03-23 RX ADMIN — SODIUM CHLORIDE 500 ML: 0.9 INJECTION, SOLUTION INTRAVENOUS at 09:40

## 2023-03-23 RX ADMIN — POTASSIUM CHLORIDE 40 MEQ: 1500 TABLET, EXTENDED RELEASE ORAL at 16:02

## 2023-03-23 NOTE — ASSESSMENT & PLAN NOTE
PT presenting with worsening SOB with cold-like symptoms for the past 3 weeks    Completed a course of doxycycline    Chest x-ray showing pneumonia  CTA PE showing: No PE, severe multifocal right pneumonia, moderate interstitial edema, stable small loculated left and trace right pleural effusion  Troponins negative  Pro-Shaw negative    Patient meeting SIRS criteria with heart rate 97, respiratory rate 20 leukocytosis of 17 94  Source being pneumonia  Severe sepsis sepsis criteria met with a lactic acid of 2 3-> now cleared  Was given x2 500 mL/bolus-> given history of CHF will give gentle IVF, BP is ok and LA has cleared    Continue gentle IV fluids  Continue vanc cefepime day 1  RPP negative  Urine ag negative  bld cx NGTD @ 24h  MRSA cx pending  Sputum cx pending  procal 3/24 + continue to trend  BAL on Monday  Bronchodilators with hypertonic saline  IGg 1,2,3,4, TANYA, ANCA, antimyeloperoxidase, antiproteinase, CCP, glomerular basement membrane antibody, RF Aspergillus antibodies and Aspergillus galactomannan antigen pending  COVID negative  IS, ACP, RP, mucinex, tessalon   ID and pum following

## 2023-03-23 NOTE — ASSESSMENT & PLAN NOTE
Lab Results   Component Value Date    HGBA1C 8 9 (H) 02/01/2023       No results for input(s): POCGLU in the last 72 hours      Blood Sugar Average: Last 72 hrs:     A1c elevated  ISS  Hold oral meds  Hypoglycemic protocol  DM low salt diet

## 2023-03-23 NOTE — PROGRESS NOTES
Isabell Moody is a 69 y o  male who is currently ordered Vancomycin IV with management by the Pharmacy Consult service  Relevant clinical data and objective / subjective history reviewed  Vancomycin Assessment:  Indication and Goal AUC/Trough: Pneumonia (goal -600, trough >10)  Clinical Status:  New  Micro:     Renal Function:  SCr: 0 91 mg/dL  CrCl: 71 mL/min  Renal replacement: Not on dialysis  Days of Therapy: 1  Current Dose: 1000mg IV Q12H  Vancomycin Plan:  New Dosinmg IV Q12H  Estimated AUC: 496 mcg*hr/mL  Estimated Trough: 16 5 mcg/mL  Next Level: with AM labs at 0600 on 3/25/23  Renal Function Monitoring: Daily BMP and [place] will continue to follow closely for s/sx of nephrotoxicity, infusion reactions and appropriateness of therapy  BMP and CBC will be ordered per protocol  We will continue to follow the patient’s culture results and clinical progress daily      Elgin Jackson, Pharmacist

## 2023-03-23 NOTE — RESPIRATORY THERAPY NOTE
"RT Protocol Note  Eber Slaughter 69 y o  male MRN: [id number]  Unit/Bed#: -01 Encounter: [id number]    Assessment    Principal Problem:    Severe sepsis [facility])  Active Problems:    Type 2 diabetes mellitus without complication, without long-term current use of insulin (HCC)    Dyslipidemia    Multiple myeloma not having achieved remission (HCC)    Essential hypertension    Chronic diastolic congestive heart failure (HCC)    Paroxysmal atrial fibrillation (HCC)    Hypokalemia    Pneumonia of right lower lobe due to infectious organism    Acute respiratory insufficiency      Home Pulmonary Medications:  none       Past Medical History:   Diagnosis Date    Acute respiratory failure with hypoxia ([facility] ) 2022    Arthritis     Cancer ([facility] )     Cataract     Colitis     Colon polyp     Diabetes mellitus ([place] [id number] )     Fatty liver     GERD (gastroesophageal reflux disease)     History of chemotherapy     History of radiation therapy     History of shingles 2018    History of transfusion     Hyperlipidemia     Hypertension     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[facility] )      Social History     Socioeconomic History    Marital status: /Civil Union     Spouse name: None    Number of children: None    Years of education: None    Highest education level: None   Occupational History    Occupation: WORKING FULLTIME    Tobacco Use    Smoking status: Former     Packs/day: 1 00     Years: 40 00     Pack years: 40 00     Types: Cigarettes     Start date:      Quit date: 2009     Years since quittin 2    Smokeless tobacco: Never   Vaping Use    Vaping Use: Never used   Substance and Sexual Activity    Alcohol use:  Yes     Alcohol/week: 2 0 standard drinks     Types: 2 Cans of beer per week     Comment: very rare \"beer here and there\"    Drug use: No    Sexual activity: Not Currently     Partners: Female     Birth control/protection: None   Other Topics Concern    None   Social History Narrative    None     Social Determinants of Health " Financial Resource Strain: Not on file   Food Insecurity: No Food Insecurity    Worried About [address] [employer] in the Last Year: Never true    Ran Out of Food in the Last Year: Never true   Transportation Needs: No Transportation Needs    Lack of Transportation (Medical): No    Lack of Transportation (Non-Medical): No   Physical Activity: Not on file   Stress: Not on file   Social Connections: Not on file   Intimate Partner Violence: Not on file   Housing Stability: Low Risk     Unable to Pay for Housing in the Last Year: No    Number of Places Lived in the Last Year: 1    Unstable Housing in the Last Year: No       Subjective         Objective    Physical Exam:   Assessment Type: Assess only  General Appearance: Alert, Awake  Respiratory Pattern: Normal  Chest Assessment: Chest expansion symmetrical  Bilateral Breath Sounds: Diminished, Coarse  Cough: Productive    Vitals:  Blood pressure 108/62, pulse 82, temperature 98 5 °F (36 9 °C), resp  rate 17, weight 73 kg (160 lb 15 oz), SpO2 93 %            Imaging and other studies:           Plan    Respiratory Plan: No distress/Pulmonary history

## 2023-03-23 NOTE — ASSESSMENT & PLAN NOTE
PT presenting with worsening SOB with cold-like symptoms for the past 3 weeks    Completed a course of doxycycline    Chest x-ray showing pneumonia  CTA PE showing: No PE, severe multifocal right pneumonia, moderate interstitial edema, stable small loculated left and trace right pleural effusion  Troponins negative  Pro-Shaw negative    Patient meeting SIRS criteria with heart rate 97, respiratory rate 20 leukocytosis of 17 94  Source being pneumonia  Severe sepsis sepsis criteria met with a lactic acid of 2 3-> now cleared  Was given x2 500 mL/bolus-> given history of CHF will give gentle IVF, BP is ok and LA has cleared    Continue gentle IV fluids  Continue vanc cefepime  RPP  Urine ag  bld cx pending  MRSA cx  Sputum cx  COVID negative  IS, ACP, RP, mucinex, tessalon   ID consult recs appreciated

## 2023-03-23 NOTE — ASSESSMENT & PLAN NOTE
Follows with [facility] oncology  On daratumumab, Velcade and dexamethasone-> hold d/t sepsis

## 2023-03-23 NOTE — SEPSIS NOTE
"  Sepsis Note   Patel Last 69 y o  male MRN: [id number]  Unit/Bed#: ED 10 Encounter: [id number]       Initial Sepsis Screening     Row Name 03/23/23 0831                Is the patient's history suggestive of a new or worsening infection? Yes (Proceed)  -LG        Suspected source of infection pneumonia  -LG        Indicate SIRS criteria Tachycardia > 90 bpm;Tachypnea > 20 resp per min; Hyperthemia > 38 3C (100 9F) OR Hypothermia <36C (96 8F)  -LG        Are two or more of the above signs & symptoms of infection both present and new to the patient? Yes (Proceed)  -LG        Assess for evidence of organ dysfunction: Are any of the below criteria present within 6 hours of suspected infection and SIRS criteria that are NOT considered to be chronic conditions? Lactate > 2 0  -LG        Date of presentation of severe sepsis --        Time of presentation of severe sepsis 0831  -LG        Sepsis Note: Click \"NEXT\" below (NOT \"close\") to generate sepsis note based on above information  --              User Key  (r) = Recorded By, (t) = Taken By, (c) = Cosigned By    [address] Name Provider Type    LG Alvina Noguera DO Physician                    Body mass index is 22 45 kg/m²    Wt Readings from Last 1 Encounters:   03/23/23 73 kg (160 lb 15 oz)        Ideal body weight: 75 3 kg (166 lb 0 1 oz)  "

## 2023-03-23 NOTE — ASSESSMENT & PLAN NOTE
Replete as necessary with goal greater than 4    Check magnesium  Replete as necessary with goal greater than 2

## 2023-03-23 NOTE — ASSESSMENT & PLAN NOTE
Lab Results   Component Value Date    HGBA1C 8 9 (H) 02/01/2023       Recent Labs     03/23/23  1233 03/23/23  1558 03/24/23  0734   POCGLU 125 133 110       Blood Sugar Average: Last 72 hrs:     A1c elevated  ISS  Hold oral meds  Hypoglycemic protocol  DM low salt diet

## 2023-03-23 NOTE — ASSESSMENT & PLAN NOTE
Wt Readings from Last 3 Encounters:   03/23/23 73 kg (160 lb 15 oz)   03/13/23 73 2 kg (161 lb 6 4 oz)   03/01/23 73 5 kg (162 lb)     Currently hypovolemic, will need IVF for severe sepsis  monitor resp status  Hold diuretics    Echo done on 5/2/2022 showing EF of 86% systolic function low normal grade 2 diastolic dysfunction aortic valve biprosthetic valve  I's and O's  Daily wts  DM low salt diet

## 2023-03-23 NOTE — ED PROVIDER NOTES
History  Chief Complaint   Patient presents with   • Shortness of Breath     To ED with c/o cold sx x 3 weeks  Was on antibiotic  States that he developed a fever yesterday and increased SOB, coughing as well     Patient is a 69year old M who presents with cough, congestion, fatigue x 3 weeks  Patient reports that approximately 3 weeks ago he developed a cough with sputum production, congestion  Initially, he thought that it was improving, but then he started to feel worse  His physician prescribed him doxycycline which she completed 2 days ago  Despite completing the doxycycline he states that over the last 2 days his cough has worsened and now he has brown sputum production and started to spike fevers  He states that at home this morning his fever was 101  6  He took 2 pills of Tylenol prior to arrival               Prior to Admission Medications   Prescriptions Last Dose Informant Patient Reported? Taking?    Daratumumab-Hyaluronidase-fihj (DARZALEX FASPRO SC)   Yes No   Sig: Inject 1,800 mg under the skin Twice a month   Empagliflozin 25 MG TABS   No No   Sig: Take 1 tablet (25 mg total) by mouth every morning   IMMUNE GLOBULIN, HUMAN, IV   Yes No   Sig: Inject 30,000 mg into a catheter in a vein   aspirin 81 MG tablet   Yes No   Sig: Take 1 tablet (81 mg total) by mouth daily   atorvastatin (LIPITOR) 40 mg tablet   No No   Sig: TAKE 1 TABLET BY MOUTH EVERY DAY   bortezomib (VELCADE)   Yes No   Sig: Inject 2 5 mg under the skin see administration instructions Every 2 months   cholecalciferol (VITAMIN D3) 1,000 units tablet   No No   Sig: Take 2 tablets (2,000 Units total) by mouth daily   dexamethasone (DECADRON) 4 mg tablet   Yes No   Sig: Take 5 pills (20 mg) once a week on Thrus   dexamethasone, PF, (DECADRON) 10 mg/mL   Yes No   Si mg see administration instructions Twice a month   Patient not taking: Reported on 3/13/2023   ferrous sulfate 324 (65 Fe) mg   No No   Sig: Take 1 tablet (324 mg total) by mouth daily with breakfast   furosemide (LASIX) 20 mg tablet   No No   Sig: TAKE 1 TABLET BY MOUTH TWICE A DAY   metFORMIN (GLUCOPHAGE-XR) 500 mg 24 hr tablet   No No   Sig: Take 3 tablets (1,500 mg total) by mouth daily with breakfast Take 3 pills in morning   metoprolol succinate (TOPROL-XL) 100 mg 24 hr tablet   No No   Sig: TAKE 1 TABLET BY MOUTH EVERY DAY   montelukast (SINGULAIR) 10 mg tablet   Yes No   Sig: Take 10 mg by mouth daily   Patient not taking: Reported on 3/13/2023   multivitamin-minerals (CENTRUM ADULTS) tablet   No No   Sig: Take 1 tablet by mouth daily   nitroglycerin (NITROSTAT) 0 3 mg SL tablet   Yes No   Sig: Nitrostat 0 3 mg sublingual tablet   Place 1 tablet as needed by sublingual route as needed for 90 days  omeprazole (PriLOSEC) 40 MG capsule   No No   Sig: TAKE 1 CAPSULE (40 MG TOTAL) BY MOUTH DAILY     valACYclovir (VALTREX) 500 mg tablet   Yes No   Sig: Take 500 mg by mouth daily      Facility-Administered Medications: None       Past Medical History:   Diagnosis Date   • Acute respiratory failure with hypoxia ([facility] ) 04/20/2022   • Arthritis    • Cancer [facility])    • Cataract    • Colitis    • Colon polyp    • Diabetes mellitus ([facility] )    • Fatty liver    • GERD (gastroesophageal reflux disease)    • History of chemotherapy    • History of radiation therapy    • History of shingles 2018   • History of transfusion    • Hyperlipidemia    • Hypertension    • 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[facility] )        Past Surgical History:   Procedure Laterality Date   • AORTIC VALVE REPLACEMENT     • CARDIAC VALVE REPLACEMENT  2014    aorta   • CATARACT EXTRACTION Bilateral    • COLECTOMY     • COLONOSCOPY  11/2019    Prior right hemicolectomy   • CORONARY ARTERY BYPASS GRAFT     • DENTAL SURGERY     • JOINT REPLACEMENT  January 2019    left knee   • KNEE SURGERY     • LYMPH NODE BIOPSY  2003   • LYMPHADENECTOMY     • OTHER SURGICAL HISTORY      MAZE PROCEDURE   • OR ARTHRP KNE CONDYLE&PLATU MEDIAL&LAT COMPARTMENTS Left "2019    Procedure: ARTHROPLASTY LEFT KNEE TOTAL;  Surgeon: Vesta Newman MD;  Location:  MAIN OR;  Service: Orthopedics   • OH LARYNGOSCOPY DIRECT OPERATIVE W/BIOPSY Right 2022    Procedure: DIRECT LARYNGOSCOPY WITH BIOPSY RIGHT PHARYNX, POSSIBLE RIGHT NECK LYMPH NODE BIOPSY; FROZEN SECTION;  Surgeon: Shweta Lombardi MD;  Location:  MAIN OR;  Service: ENT   • SKIN BIOPSY     • UPPER GASTROINTESTINAL ENDOSCOPY  2019    Schatzki ring   • US GUIDED LYMPH NODE BIOPSY RIGHT  2021       Family History   Problem Relation Age of Onset   • Heart block Family    • Coronary artery disease Mother    • Thrombosis Mother    • Hypertension Mother    • Arthritis Mother    • Hyperlipidemia Mother    • Diabetes Father    • Hypertension Father    • Arthritis Father    • Sudden death Father         cardiac   • Diabetes type II Father    • Colon cancer Paternal Grandfather    • Cancer Paternal Grandfather    • Breast cancer Sister    • Heart disease Brother         Coronary stents     I have reviewed and agree with the history as documented  E-Cigarette/Vaping   • E-Cigarette Use Never User      E-Cigarette/Vaping Substances   • Nicotine No    • THC No    • CBD No    • Flavoring No    • Other No    • Unknown No      Social History     Tobacco Use   • Smoking status: Former     Packs/day: 1 00     Years: 40 00     Pack years: 40 00     Types: Cigarettes     Start date: 47     Quit date: 2009     Years since quittin 2   • Smokeless tobacco: Never   Vaping Use   • Vaping Use: Never used   Substance Use Topics   • Alcohol use: Yes     Alcohol/week: 2 0 standard drinks     Types: 2 Cans of beer per week     Comment: very rare \"beer here and there\"   • Drug use: No       Review of Systems   Constitutional: Positive for chills, fatigue and fever  HENT: Positive for congestion  Negative for sore throat  Respiratory: Positive for cough and shortness of breath      Cardiovascular: Negative for chest pain " and palpitations  Gastrointestinal: Negative for nausea and vomiting  Musculoskeletal: Negative for neck pain and neck stiffness  Neurological: Positive for weakness (generalized)  Negative for dizziness, light-headedness and headaches  Physical Exam  Physical Exam  Vitals and nursing note reviewed  Constitutional:       General: He is not in acute distress  Appearance: Normal appearance  He is well-developed  He is not ill-appearing, toxic-appearing or diaphoretic  Interventions: He is not intubated  HENT:      Head: Normocephalic and atraumatic  Mouth/Throat:      Mouth: Mucous membranes are moist    Eyes:      Conjunctiva/sclera: Conjunctivae normal       Pupils: Pupils are equal, round, and reactive to light  Cardiovascular:      Rate and Rhythm: Regular rhythm  Tachycardia present  Pulses: Normal pulses  Heart sounds: Normal heart sounds  No murmur heard  Pulmonary:      Effort: Pulmonary effort is normal  Tachypnea present  No bradypnea, accessory muscle usage or respiratory distress  He is not intubated  Breath sounds: No stridor  Rhonchi present  No wheezing or rales  Chest:      Chest wall: No tenderness  Abdominal:      General: Bowel sounds are normal  There is no distension  Palpations: Abdomen is soft  Tenderness: There is no abdominal tenderness  There is no guarding or rebound  Musculoskeletal:      Cervical back: Neck supple  Right lower leg: No edema  Left lower leg: No edema  Skin:     General: Skin is warm and dry  Neurological:      General: No focal deficit present  Mental Status: He is alert and oriented to person, place, and time  Mental status is at baseline     Psychiatric:         Mood and Affect: Mood normal          Behavior: Behavior normal          Vital Signs  ED Triage Vitals [03/23/23 0721]   Temperature Pulse Respirations Blood Pressure SpO2   99 2 °F (37 3 °C) 97 20 124/58 91 %      Temp Source Heart Rate Source Patient Position - Orthostatic VS BP Location FiO2 (%)   Temporal Monitor Lying Right arm --      Pain Score       No Pain           Vitals:    03/23/23 0721   BP: 124/58   Pulse: 97   Patient Position - Orthostatic VS: Lying         Visual Acuity      ED Medications  Medications   vancomycin (VANCOCIN) 1,750 mg in sodium chloride 0 9 % 500 mL IVPB (1,750 mg Intravenous New Bag 3/23/23 0837)   sodium chloride 0 9 % bolus 500 mL (500 mL Intravenous New Bag 3/23/23 0940)   potassium chloride (K-DUR,KLOR-CON) CR tablet 40 mEq (has no administration in time range)   aspirin chewable tablet 324 mg (has no administration in time range)   sodium chloride 0 9 % bolus 500 mL (0 mL Intravenous Stopped 3/23/23 0939)   cefepime (MAXIPIME) IVPB (premix in dextrose) 2,000 mg 50 mL (2,000 mg Intravenous New Bag 3/23/23 0816)   iohexol (OMNIPAQUE) 350 MG/ML injection (SINGLE-DOSE) 100 mL (85 mL Intravenous Given 3/23/23 7996)       Diagnostic Studies  Results Reviewed     Procedure Component Value Units Date/Time    FLU/RSV/COVID - if FLU/RSV clinically relevant [[id number]]  (Normal) Collected: 03/23/23 0739    Lab Status: Final result Specimen: Nares from Nose Updated: 03/23/23 1004     SARS-CoV-2 Negative     INFLUENZA A PCR Negative     INFLUENZA B PCR Negative     RSV PCR Negative    Narrative:      FOR PEDIATRIC PATIENTS - copy/paste COVID Guidelines URL to browser: [email or web address]  [employer]    SARS-CoV-2 assay is a Nucleic Acid Amplification assay intended for the  qualitative detection of nucleic acid from SARS-CoV-2 in nasopharyngeal  swabs  Results are for the presumptive identification of SARS-CoV-2 RNA  Positive results are indicative of infection with SARS-CoV-2, the virus  causing COVID-19, but do not rule out bacterial infection or co-infection  with other viruses   Laboratories within the [place] and its  territories are required to report all positive results to the appropriate  public health authorities  Negative results do not preclude SARS-CoV-2  infection and should not be used as the sole basis for treatment or other  patient management decisions  Negative results must be combined with  clinical observations, patient history, and epidemiological information  This test has not been FDA cleared or approved  This test has been authorized by FDA under an Emergency Use Authorization  (EUA)  This test is only authorized for the duration of time the  declaration that circumstances exist justifying the authorization of the  emergency use of an in vitro diagnostic tests for detection of SARS-CoV-2  virus and/or diagnosis of COVID-19 infection under section 564(b)(1) of  the Act, 21 U  S C  424FGD-7(K)(9), unless the authorization is terminated  or revoked sooner  The test has been validated but independent review by FDA  and CLIA is pending  Test performed using [employer] GeneXpert: This RT-PCR assay targets N2,  a region unique to SARS-CoV-2  A conserved region in the E-gene was chosen  for pan-Sarbecovirus detection which includes SARS-CoV-2  According to CMS-2020-01-R, this platform meets the definition of high-throughput technology  UA w Reflex to Microscopic w Reflex to Culture [[id number]]  (Abnormal) Collected: 03/23/23 0941    Lab Status: Final result Specimen: Urine, Clean Catch Updated: 03/23/23 0951     Color, UA Yellow     Clarity, UA Clear     Specific Gravity, UA <1 005     pH, UA 5 0     Leukocytes, UA Negative     Nitrite, UA Negative     Protein, UA Negative mg/dl      Glucose, UA 1000 (1%) mg/dl      Ketones, UA Negative mg/dl      Urobilinogen, UA <2 0 mg/dl      Bilirubin, UA Negative     Occult Blood, UA Negative    Lactic acid 2 Hours [[id number]] Collected: 03/23/23 0941    Lab Status: In process Specimen: Blood from Arm, Right Updated: 03/23/23 0946    HS Troponin I 2hr [[id number]] Collected: 03/23/23 0941    Lab Status:  In process Specimen: Blood from Arm, Right Updated: 03/23/23 0946    D-dimer, quantitative [[id number]]  (Abnormal) Collected: 03/23/23 0739    Lab Status: Final result Specimen: Blood from Arm, Right Updated: 03/23/23 0855     D-Dimer, Quant 1 38 ug/ml FEU     Narrative: In the evaluation for possible pulmonary embolism, in the appropriate (Well's Score of 4 or less) patient, the age adjusted d-dimer cutoff for this patient can be calculated as:    Age x 0 01 (in ug/mL) for Age-adjusted D-dimer exclusion threshold for a patient over 50 years  Procalcitonin [[id number]]  (Normal) Collected: 03/23/23 0739    Lab Status: Final result Specimen: Blood from Arm, Right Updated: 03/23/23 0827     Procalcitonin 0 19 ng/ml     Lactic acid [[id number]]  (Abnormal) Collected: 03/23/23 0739    Lab Status: Final result Specimen: Blood from Arm, Right Updated: 03/23/23 0825     LACTIC ACID 2 3 mmol/L     Narrative:      Result may be elevated if tourniquet was used during collection      HS Troponin 0hr (reflex protocol) [[id number]]  (Abnormal) Collected: 03/23/23 0739    Lab Status: Final result Specimen: Blood from Arm, Right Updated: 03/23/23 0824     hs TnI 0hr 82 ng/L     HS Troponin I 4hr [[id number]]     Lab Status: No result Specimen: Blood     B-Type Natriuretic Peptide(BNP) [[id number]]  (Abnormal) Collected: 03/23/23 0739    Lab Status: Final result Specimen: Blood from Arm, Right Updated: 03/23/23 0822      pg/mL     Comprehensive metabolic panel [[id number]]  (Abnormal) Collected: 03/23/23 0739    Lab Status: Final result Specimen: Blood from Arm, Right Updated: 03/23/23 0819     Sodium 139 mmol/L      Potassium 3 0 mmol/L      Chloride 99 mmol/L      CO2 28 mmol/L      ANION GAP 12 mmol/L      BUN 17 mg/dL      Creatinine 0 91 mg/dL      Glucose 186 mg/dL      Calcium 9 0 mg/dL      AST 13 U/L      ALT 9 U/L      Alkaline Phosphatase 88 U/L      Total Protein 6 6 g/dL      Albumin 3 8 g/dL      Total Bilirubin 0 50 mg/dL      eGFR 81 ml/min/1 73sq m     Narrative:      National Kidney Disease Foundation guidelines for Chronic Kidney Disease (CKD):   •  Stage 1 with normal or high GFR (GFR > 90 mL/min/1 73 square meters)  •  Stage 2 Mild CKD (GFR = 60-89 mL/min/1 73 square meters)  •  Stage 3A Moderate CKD (GFR = 45-59 mL/min/1 73 square meters)  •  Stage 3B Moderate CKD (GFR = 30-44 mL/min/1 73 square meters)  •  Stage 4 Severe CKD (GFR = 15-29 mL/min/1 73 square meters)  •  Stage 5 End Stage CKD (GFR <15 mL/min/1 73 square meters)  Note: GFR calculation is accurate only with a steady state creatinine    Protime-INR [[id number]]  (Normal) Collected: 03/23/23 0739    Lab Status: Final result Specimen: Blood from Arm, Right Updated: 03/23/23 0817     Protime 13 7 seconds      INR 0 98    APTT [[id number]]  (Normal) Collected: 03/23/23 0739    Lab Status: Final result Specimen: Blood from Arm, Right Updated: 03/23/23 0817     PTT 27 seconds     CBC and differential [[id number]]  (Abnormal) Collected: 03/23/23 0739    Lab Status: Final result Specimen: Blood from Arm, Right Updated: 03/23/23 0752     WBC 17 94 Thousand/uL      RBC 4 24 Million/uL      Hemoglobin 12 2 g/dL      Hematocrit 37 4 %      MCV 88 fL      MCH 28 8 pg      MCHC 32 6 g/dL      RDW 14 6 %      MPV 9 7 fL      Platelets 438 Thousands/uL      nRBC 0 /100 WBCs      Neutrophils Relative 78 %      Immat GRANS % 1 %      Lymphocytes Relative 8 %      Monocytes Relative 8 %      Eosinophils Relative 4 %      Basophils Relative 1 %      Neutrophils Absolute 14 09 Thousands/µL      Immature Grans Absolute 0 11 Thousand/uL      Lymphocytes Absolute 1 50 Thousands/µL      Monocytes Absolute 1 48 Thousand/µL      Eosinophils Absolute 0 63 Thousand/µL      Basophils Absolute 0 13 Thousands/µL     Blood culture #2 [[id number]] Collected: 03/23/23 0739    Lab Status:  In process Specimen: Blood from Arm, Right Updated: 03/23/23 0748    Blood culture #1 [[id number]] Collected: 03/23/23 0739    Lab Status: In process Specimen: Blood from Arm, Left Updated: 03/23/23 1785                 CTA ED chest PE Study   Final Result by Jim Anne MD (03/23 1006)      No pulmonary embolus  Severe multifocal right pneumonia  Moderate interstitial edema  Stable small loculated left and trace right pleural effusion  Workstation performed: [id number]         XR chest pa & lateral   ED Interpretation by Marisel Alexander DO (03/23 7433)   Abnormal   Right upper and middle lobe consolidation concerning for PNA as interpreted by me independently                  Procedures  ECG 12 Lead Documentation Only    Date/Time: 3/23/2023 7:51 AM  Performed by: Marisel Alexander DO  Authorized by: Marisel Alexander DO     Indications / Diagnosis:  Sob  ECG reviewed by me, the ED Provider: yes    Patient location:  ED  Previous ECG:     Previous ECG:  Compared to current    Comparison ECG info:  2/4/2023    Similarity:  No change  Interpretation:     Interpretation: non-specific    Rate:     ECG rate:  91    ECG rate assessment: normal    Rhythm:     Rhythm: sinus rhythm    Ectopy:     Ectopy: none    QRS:     QRS axis:  Left    QRS intervals:   Wide  Conduction:     Conduction: abnormal      Abnormal conduction: complete LBBB    ST segments:     ST segments:  Non-specific  T waves:     T waves: non-specific    Comments:      qtc 531    CriticalCare Time    Date/Time: 3/23/2023 8:34 AM  Performed by: Marisel Alexander DO  Authorized by: Marisel Alexander DO     Critical care provider statement:     Critical care time (minutes):  45    Critical care start time:  3/23/2023 7:39 AM    Critical care end time:  3/23/2023 8:34 AM    Critical care time was exclusive of:  Separately billable procedures and treating other patients and teaching time    Critical care was necessary to treat or prevent imminent or life-threatening deterioration of the following conditions:  Respiratory failure and sepsis    Critical care was time spent personally by me on the following activities:  Blood draw for specimens, obtaining history from patient or surrogate, development of treatment plan with patient or surrogate, discussions with consultants, discussions with primary provider, evaluation of patient's response to treatment, examination of patient, re-evaluation of patient's condition, review of old charts, ordering and review of radiographic studies, ordering and review of laboratory studies and ordering and performing treatments and interventions    I assumed direction of critical care for this patient from another provider in my specialty: no               ED Course  ED Course as of 03/23/23 1010   Thu Mar 23, 2023   0809 WBC(!): 17 94  Steroid dose 3 weeks ago, so do not believe that it is contributing to the leukocytosis at this time   0825 LACTIC ACID(!!): 2 3  Getting antibiotics and IVF   0825 hs TnI 0hr(!): 82  Likely due to demand from severe sepsis 2/2 PNA  Patient has no CP    0825 BNP(!): 326   0827 Patient is requiring 4L O2 via NC to maintain sats 91%   0827 Potassium(!): 3 0  Will order 40 meQ at this time  6912 Discussed case with Dr Magnolia Schmid for admission in the setting of severe sepsis  At this time requesting a CTA PE study in order to definitively rule out PE  After discussion, since patient's clinical history is most persistent with pneumonia, decided to start with dimer to attempt to rule out PE as source of hypoxia prior to admission  3604 D-Dimer, Quant(!): 1 38  PE study ordered                HEART Risk Score    Flowsheet Row Most Recent Value   Heart Score Risk Calculator    History 0 Filed at: 03/23/2023 0832   ECG 0 Filed at: 03/23/2023 7803   Age 2 Filed at: 03/23/2023 0826   Risk Factors 2 Filed at: 03/23/2023 2145   Troponin 2 Filed at: 03/23/2023 6895   HEART Score 6 Filed at: 03/23/2023 5854                     Initial Sepsis Screening     Row Name 03/23/23 7622 "               Is the patient's history suggestive of a new or worsening infection? Yes (Proceed)  -LG        Suspected source of infection pneumonia  -LG        Indicate SIRS criteria Tachycardia > 90 bpm;Tachypnea > 20 resp per min; Hyperthemia > 38 3C (100 9F) OR Hypothermia <36C (96 8F)  -LG        Are two or more of the above signs & symptoms of infection both present and new to the patient? Yes (Proceed)  -LG        Assess for evidence of organ dysfunction: Are any of the below criteria present within 6 hours of suspected infection and SIRS criteria that are NOT considered to be chronic conditions? Lactate > 2 0  -LG        Date of presentation of severe sepsis --        Time of presentation of severe sepsis 0831  -LG        Sepsis Note: Click \"NEXT\" below (NOT \"close\") to generate sepsis note based on above information  --              User Key  (r) = Recorded By, (t) = Taken By, (c) = Cosigned By    [address] Name Provider Type     Lila Kenyon,  Physician                              Medical Decision Making  Assessment and Plan:   Differential includes PNA with failure of outpatient treatment v  Viral URI  As patient is immunocompromised, the hypoxia of 89% on RA requiring supplemental O2 via NC, and just completed a round of doxycycline 48 hours ago, will require admission for IV antibiotics, supplemental O2 via NC  Will check labs to assess for leukocytosis, anemia, kidney and liver function as well as electrolyte abnormalities; chest x-ray to evaluate for pneumonia; EKG/troponin to evaluate for arrhythmia/ischemia  Due to immunocompromised status, will start cefepime and vancomycin for broad spectrum coverage at this time  Review of medical records shows that the patient has a past medical history significant for CAD s/p CABG, dCHF, HTN, HLD, DM, MM on chemotherapy and decadron post chemo  Amount and/or Complexity of Data Reviewed  Labs: ordered    Radiology: " ordered  Risk  Prescription drug management  Decision regarding hospitalization  Disposition  Final diagnoses:   Acute respiratory failure with hypoxia ([facility] )   Failure of outpatient treatment   Pneumonia   Severe sepsis (HCC)   Hypokalemia   Elevated troponin     Time reflects when diagnosis was documented in both MDM as applicable and the Disposition within this note     Time User Action Codes Description Comment    3/23/2023  7:41 AM Shara Christians Add [J96 01] Acute respiratory failure with hypoxia ([facility] )     3/23/2023  7:41 AM Karla Kitchen [Z78 9] Failure of outpatient treatment     3/23/2023  7:41 AM Shara Christians Add [A41 9] Sepsis ([facility] )     3/23/2023  8:16 AM Shara Christians Add [J18 9] Pneumonia     3/23/2023  8:26 AM Shara Christians Add [A41 9,  R65 20] Severe sepsis ([facility] )     3/23/2023  8:26 AM Shara Christians Remove [A41 9] Sepsis ([facility] )     3/23/2023  8:30 AM Shara Christians Add [E87 6] Hypokalemia     3/23/2023  8:31 AM Shara Christians Add [R77 8] Elevated troponin       ED Disposition     ED Disposition   Admit    Condition   Stable    Date/Time   Thu Mar 23, 2023  7:41 AM    Comment   Case was discussed with hospitalist and the patient's admission status was agreed to be Admission Status: inpatient status to the service of Dr Marcos Person   Follow-up Information    None         Patient's Medications   Discharge Prescriptions    No medications on file       No discharge procedures on file      PDMP Review       Value Time User    PDMP Reviewed  Yes 12/9/2022  8:38 AM Gina Hummel MD          ED Provider  Electronically Signed by           Alvina Noguera, DO  03/23/23 1307 [facility], DO  03/23/23 1010

## 2023-03-23 NOTE — PLAN OF CARE
Problem: INFECTION - ADULT  Goal: Absence or prevention of progression during hospitalization  Description: INTERVENTIONS:  - Assess and monitor for signs and symptoms of infection  - Monitor lab/diagnostic results  - Monitor all insertion sites, i e  indwelling lines, tubes, and drains  - Monitor endotracheal if appropriate and nasal secretions for changes in amount and color  - [place] appropriate cooling/warming therapies per order  - Administer medications as ordered  - Instruct and encourage patient and family to use good hand hygiene technique  - Identify and instruct in appropriate isolation precautions for identified infection/condition  Outcome: Progressing     Problem: Knowledge Deficit  Goal: Patient/family/caregiver demonstrates understanding of disease process, treatment plan, medications, and discharge instructions  Description: Complete learning assessment and assess knowledge base    Interventions:  - Provide teaching at level of understanding  - Provide teaching via preferred learning methods  Outcome: Progressing

## 2023-03-23 NOTE — ASSESSMENT & PLAN NOTE
Follows with [facility] oncology  On daratumumab, Velcade and dexamethasone-> hold d/t sepsis

## 2023-03-23 NOTE — H&P
Otto Macias  H&P  Name: Raghavendra Newsome  MRN: [id number]  Unit/Bed#: ED 10 I Date of Admission: 3/23/2023   Date of Service: 3/23/2023 I Hospital Day: 0      Assessment/Plan   * Severe sepsis [facility])  Assessment & Plan  PT presenting with worsening SOB with cold-like symptoms for the past 3 weeks    Completed a course of doxycycline    Chest x-ray showing pneumonia  CTA PE showing: No PE, severe multifocal right pneumonia, moderate interstitial edema, stable small loculated left and trace right pleural effusion  Troponins negative  Pro-Shaw negative    Patient meeting SIRS criteria with heart rate 97, respiratory rate 20 leukocytosis of 17 94  Source being pneumonia  Severe sepsis sepsis criteria met with a lactic acid of 2 3-> now cleared  Was given x2 500 mL/bolus-> given history of CHF will give gentle IVF, BP is ok and LA has cleared    Continue gentle IV fluids  Continue vanc cefepime  RPP  Urine ag  bld cx pending  MRSA cx  Sputum cx  COVID negative  IS, ACP, RP, mucinex, tessalon   ID consult recs appreciated        Pneumonia of right lower lobe due to infectious organism  Assessment & Plan  See mgx of sepsis    Acute respiratory insufficiency  Assessment & Plan  Currently requiring 4L NC  Wean as tolerated  See mgx of sepsis    Hypokalemia  Assessment & Plan  Replete as necessary with goal greater than 4    Check magnesium  Replete as necessary with goal greater than 2    Paroxysmal atrial fibrillation (HCC)  Assessment & Plan  RC with metoprolol  Not on any AC     Chronic diastolic congestive heart failure (HCC)  Assessment & Plan  Wt Readings from Last 3 Encounters:   03/23/23 73 kg (160 lb 15 oz)   03/13/23 73 2 kg (161 lb 6 4 oz)   03/01/23 73 5 kg (162 lb)     Currently hypovolemic, will need IVF for severe sepsis  monitor resp status  Hold diuretics    Echo done on 5/2/2022 showing EF of 10% systolic function low normal grade 2 diastolic dysfunction aortic valve biprosthetic valve  I's and O's  Daily wts  DM low salt diet        Essential hypertension  Assessment & Plan  Hold lasix  Continue metoprolol     Multiple myeloma not having achieved remission ([facility] [address] )  Assessment & Plan  Follows with [facility] oncology  On daratumumab, Velcade and dexamethasone-> hold d/t sepsis    Dyslipidemia  Assessment & Plan  Continue statin therapy    Type 2 diabetes mellitus without complication, without long-term current use of insulin (HCC)  Assessment & Plan  Lab Results   Component Value Date    HGBA1C 8 9 (H) 02/01/2023       No results for input(s): POCGLU in the last 72 hours  Blood Sugar Average: Last 72 hrs:     A1c elevated  ISS  Hold oral meds  Hypoglycemic protocol  DM low salt diet       VTE Pharmacologic Prophylaxis: VTE Score: 7 High Risk (Score >/= 5) - Pharmacological DVT Prophylaxis Ordered: enoxaparin (Lovenox)  Sequential Compression Devices Ordered  Code Status: Prior full code    Anticipated Length of Stay: Patient will be admitted on an inpatient basis with an anticipated length of stay of greater than 2 midnights secondary to sepsis 2/2 PNA  Total Time Spent on Date of Encounter in care of patient: 65 minutes This time was spent on one or more of the following: performing physical exam; counseling and coordination of care; obtaining or reviewing history; documenting in the medical record; reviewing/ordering tests, medications or procedures; communicating with other healthcare professionals and discussing with patient's family/caregivers  Chief Complaint: SOB    History of Present Illness:  Ny Richard is a 69 y o  male with a PMH of MM, HTN, HLD, Aifb, CHF who presents with shortness of breath that has been gradually worsening over the past few weeks  Has associated cold-like symptoms  Went to his PCP and was prescribed Doxy however his symptoms did not improve  Began spiking fevers with worsening shortness of breath 2 days ago    Has a nonproductive cough that is nonbloody  In the ED he was given cefepime and Vanco   Met severe sepsis criteria and received 2 500 mL normal saline bolus  COVID was negative blood cultures were drawn  He was hypoxic and requiring 4 L nasal cannula  D-dimer was elevated CTA PE was done and showed multifocal right-sided pneumonia  All other symptoms on review of systems is negative      Review of Systems:  Review of Systems    Past Medical and Surgical History:   Past Medical History:   Diagnosis Date   • [facility] 04/20/2022   • Arthritis    • Cancer [facility])    • Cataract    • Colitis    • Colon polyp    • Diabetes mellitus ([place] [address] )    • Fatty liver    • GERD (gastroesophageal reflux disease)    • History of chemotherapy    • History of radiation therapy    • History of shingles 2018   • History of transfusion    • Hyperlipidemia    • Hypertension    • 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[address] )        Past Surgical History:   Procedure Laterality Date   • AORTIC VALVE REPLACEMENT     • CARDIAC VALVE REPLACEMENT  2014    aorta   • CATARACT EXTRACTION Bilateral    • COLECTOMY     • COLONOSCOPY  11/2019    Prior right hemicolectomy   • CORONARY ARTERY BYPASS GRAFT     • DENTAL SURGERY     • JOINT REPLACEMENT  January 2019    left knee   • KNEE SURGERY     • LYMPH NODE BIOPSY  2003   • LYMPHADENECTOMY     • OTHER SURGICAL HISTORY      MAZE PROCEDURE   • MT ARTHRP KNE CONDYLE&PLATU MEDIAL&LAT COMPARTMENTS Left 01/28/2019    Procedure: ARTHROPLASTY LEFT KNEE TOTAL;  Surgeon: Paulina Young MD;  Location:  MAIN OR;  Service: Orthopedics   • MT LARYNGOSCOPY DIRECT OPERATIVE W/BIOPSY Right 04/27/2022    Procedure: DIRECT LARYNGOSCOPY WITH BIOPSY RIGHT PHARYNX, POSSIBLE RIGHT NECK LYMPH NODE BIOPSY; FROZEN SECTION;  Surgeon: Patrick Medrano MD;  Location:  MAIN OR;  Service: ENT   • SKIN BIOPSY     • UPPER GASTROINTESTINAL ENDOSCOPY  11/2019    Clementetzki ring   • US GUIDED LYMPH NODE BIOPSY RIGHT  12/14/2021 Meds/Allergies:  Prior to Admission medications    Medication Sig Start Date End Date Taking?  Authorizing Provider   aspirin 81 MG tablet Take 1 tablet (81 mg total) by mouth daily 3/29/19   Shan Victoria MD   atorvastatin (LIPITOR) 40 mg tablet TAKE 1 TABLET BY MOUTH EVERY DAY 8/4/22   Shan Victoria MD   bortezomib (VELCADE) Inject 2 5 mg under the skin see administration instructions Every 2 months    Historical Provider, MD   cholecalciferol (VITAMIN D3) 1,000 units tablet Take 2 tablets (2,000 Units total) by mouth daily 4/11/21   Nikia Perez DO   Daratumumab-Hyaluronidase-fihj (DARZALEX FASPRO SC) Inject 1,800 mg under the skin Twice a month    Historical Provider, MD   dexamethasone (DECADRON) 4 mg tablet Take 5 pills (20 mg) once a week on Thrus 2/12/23   Historical Provider, MD   dexamethasone, PF, (DECADRON) 10 mg/mL 20 mg see administration instructions Twice a month  Patient not taking: Reported on 3/13/2023    Historical Provider, MD   Empagliflozin 25 MG TABS Take 1 tablet (25 mg total) by mouth every morning 3/13/23   Chapito Wen MD   ferrous sulfate 324 (65 Fe) mg Take 1 tablet (324 mg total) by mouth daily with breakfast 9/29/22   Courtney Guillen DO   furosemide (LASIX) 20 mg tablet TAKE 1 TABLET BY MOUTH TWICE A DAY 1/2/23   Courtney Guillen DO   IMMUNE GLOBULIN, HUMAN, IV Inject 30,000 mg into a catheter in a vein    Historical Provider, MD   metFORMIN (GLUCOPHAGE-XR) 500 mg 24 hr tablet Take 3 tablets (1,500 mg total) by mouth daily with breakfast Take 3 pills in morning 3/13/23   Chapito Wen MD   metoprolol succinate (TOPROL-XL) 100 mg 24 hr tablet TAKE 1 TABLET BY MOUTH EVERY DAY 3/12/23   Courtney Guillen DO   montelukast (SINGULAIR) 10 mg tablet Take 10 mg by mouth daily  Patient not taking: Reported on 3/13/2023 8/11/22   Historical Provider, MD   multivitamin-minerals (CENTRUM ADULTS) tablet Take 1 tablet by mouth daily 4/13/21   Callum Day DO   nitroglycerin (NITROSTAT) 0 3 mg SL "tablet Nitrostat 0 3 mg sublingual tablet   Place 1 tablet as needed by sublingual route as needed for 90 days  Historical Provider, MD   omeprazole (PriLOSEC) 40 MG capsule TAKE 1 CAPSULE (40 MG TOTAL) BY MOUTH DAILY  22   Daria Wharton DO   valACYclovir (VALTREX) 500 mg tablet Take 500 mg by mouth daily 22   Historical Provider, MD GIPSON have reviewed home medications using recent Epic encounter  Allergies: Allergies   Allergen Reactions   • Ceftin [Cefuroxime] Itching     However, has tolerated Cefazolin and Pip-Tazo since, which have different side chains  Be cautious with / avoid 2nd, 3rd, or 4th Gen Cephs that have similar side chains to Cefuroxime     • Lantus [Insulin Glargine] Itching       Social History:  Marital Status: /Civil Union   Patient Pre-hospital Living Situation: Home  Patient Pre-hospital Level of Mobility: walks  Patient Pre-hospital Diet Restrictions: DM  Substance Use History:   Social History     Substance and Sexual Activity   Alcohol Use Yes   • Alcohol/week: 2 0 standard drinks   • Types: 2 Cans of beer per week    Comment: very rare \"beer here and there\"     Social History     Tobacco Use   Smoking Status Former   • Packs/day: 1 00   • Years: 40 00   • Pack years: 40 00   • Types: Cigarettes   • Start date: 47   • Quit date: 2009   • Years since quittin 2   Smokeless Tobacco Never     Social History     Substance and Sexual Activity   Drug Use No       Family History:  Family History   Problem Relation Age of Onset   • Heart block Family    • Coronary artery disease Mother    • Thrombosis Mother    • Hypertension Mother    • Arthritis Mother    • Hyperlipidemia Mother    • Diabetes Father    • Hypertension Father    • Arthritis Father    • Sudden death Father         cardiac   • Diabetes type II Father    • Colon cancer Paternal Grandfather    • Cancer Paternal Grandfather    • Breast cancer Sister    • Heart disease Brother         Coronary stents " Physical Exam:     Vitals:   Blood Pressure: 124/58 (03/23/23 0721)  Pulse: 97 (03/23/23 0721)  Temperature: 99 2 °F (37 3 °C) (03/23/23 0721)  Temp Source: Temporal (03/23/23 0721)  Respirations: 20 (03/23/23 0721)  Weight - Scale: 73 kg (160 lb 15 oz) (03/23/23 0721)  SpO2: 91 % (03/23/23 0721)    Physical Exam  Vitals and nursing note reviewed  Constitutional:       General: He is not in acute distress  Appearance: He is ill-appearing  Comments: thin frail    HENT:      Head: Normocephalic and atraumatic  Cardiovascular:      Rate and Rhythm: Normal rate and regular rhythm  Pulses: Normal pulses  Heart sounds: Normal heart sounds  Pulmonary:      Breath sounds: Rhonchi present  Comments: 4L NC  Abdominal:      General: Abdomen is flat  Bowel sounds are normal       Palpations: Abdomen is soft  Musculoskeletal:      Right lower leg: No edema  Left lower leg: No edema  Skin:     General: Skin is warm  Neurological:      General: No focal deficit present  Mental Status: He is alert and oriented to person, place, and time            Additional Data:     Lab Results:  Results from last 7 days   Lab Units 03/23/23  0739   WBC Thousand/uL 17 94*   HEMOGLOBIN g/dL 12 2   HEMATOCRIT % 37 4   PLATELETS Thousands/uL 308   NEUTROS PCT % 78*   LYMPHS PCT % 8*   MONOS PCT % 8   EOS PCT % 4     Results from last 7 days   Lab Units 03/23/23  0739   SODIUM mmol/L 139   POTASSIUM mmol/L 3 0*   CHLORIDE mmol/L 99   CO2 mmol/L 28   BUN mg/dL 17   CREATININE mg/dL 0 91   ANION GAP mmol/L 12   CALCIUM mg/dL 9 0   ALBUMIN g/dL 3 8   TOTAL BILIRUBIN mg/dL 0 50   ALK PHOS U/L 88   ALT U/L 9   AST U/L 13   GLUCOSE RANDOM mg/dL 186*     Results from last 7 days   Lab Units 03/23/23  0739   INR  0 98             Results from last 7 days   Lab Units 03/23/23  0941 03/23/23  0739   LACTIC ACID mmol/L 1 4 2 3*   PROCALCITONIN ng/ml  --  0 19       Lines/Drains:  Invasive Devices     Peripheral Intravenous Line  Duration           Peripheral IV 03/23/23 Right Antecubital <1 day                    Imaging:   CTA ED chest PE Study   Final Result by Sol Dangelo MD (03/23 1006)      No pulmonary embolus  Severe multifocal right pneumonia  Moderate interstitial edema  Stable small loculated left and trace right pleural effusion  Workstation performed: [id number]         XR chest pa & lateral   ED Interpretation by Angella Price DO (03/23 4905)   Abnormal   Right upper and middle lobe consolidation concerning for PNA as interpreted by me independently           EKG and Other Studies Reviewed on Admission:   · EKG:Previous ECG:  Compared to current     Comparison ECG info:  2/4/2023     Similarity:  No change   Interpretation:     Interpretation: non-specific     Rate:     ECG rate:  91     ECG rate assessment: normal     Rhythm:     Rhythm: sinus rhythm     Ectopy:     Ectopy: none     QRS:     QRS axis:  Left     QRS intervals:  Wide   Conduction:     Conduction: abnormal       Abnormal conduction: complete LBBB     ST segments:     ST segments:  Non-specific   T waves:     T waves: non-specific     Comments:      qtc 531    ** Please Note: This note has been constructed using a voice recognition system   **

## 2023-03-23 NOTE — PLAN OF CARE
Problem: Potential for Falls  Goal: Patient will remain free of falls  Description: INTERVENTIONS:  - Educate patient/family on patient safety including physical limitations  - Instruct patient to call for assistance with activity   - Consult OT/PT to assist with strengthening/mobility   - Keep Call bell within reach  - Keep bed low and locked with side rails adjusted as appropriate  - Keep care items and personal belongings within reach  - Initiate and maintain comfort rounds  - Make Fall Risk Sign visible to staff  - Offer Toileting every  Hours, in advance of need  - Initiate/Maintain alarm  - Obtain necessary fall risk management equipment:  - Apply yellow socks and bracelet for high fall risk patients  - Consider moving patient to room near nurses station  Outcome: Progressing     Problem: PAIN - ADULT  Goal: Verbalizes/displays adequate comfort level or baseline comfort level  Description: Interventions:  - Encourage patient to monitor pain and request assistance  - Assess pain using appropriate pain scale  - Administer analgesics based on type and severity of pain and evaluate response  - Implement non-pharmacological measures as appropriate and evaluate response  - Consider cultural and social influences on pain and pain management  - Notify physician/advanced practitioner if interventions unsuccessful or patient reports new pain  Outcome: Progressing     Problem: INFECTION - ADULT  Goal: Absence or prevention of progression during hospitalization  Description: INTERVENTIONS:  - Assess and monitor for signs and symptoms of infection  - Monitor lab/diagnostic results  - Monitor all insertion sites, i e  indwelling lines, tubes, and drains  - Monitor endotracheal if appropriate and nasal secretions for changes in amount and color  - [place] appropriate cooling/warming therapies per order  - Administer medications as ordered  - Instruct and encourage patient and family to use good hand hygiene technique  - Identify and instruct in appropriate isolation precautions for identified infection/condition  Outcome: Progressing  Goal: Absence of fever/infection during neutropenic period  Description: INTERVENTIONS:  - Monitor WBC    Outcome: Progressing     Problem: SAFETY ADULT  Goal: Patient will remain free of falls  Description: INTERVENTIONS:  - Educate patient/family on patient safety including physical limitations  - Instruct patient to call for assistance with activity   - Consult OT/PT to assist with strengthening/mobility   - Keep Call bell within reach  - Keep bed low and locked with side rails adjusted as appropriate  - Keep care items and personal belongings within reach  - Initiate and maintain comfort rounds  - Make Fall Risk Sign visible to staff  - Offer Toileting every  Hours, in advance of need  - Initiate/Maintain alarm  - Obtain necessary fall risk management equipment:  - Apply yellow socks and bracelet for high fall risk patients  - Consider moving patient to room near nurses station  Outcome: Progressing  Goal: Maintain or return to baseline ADL function  Description: INTERVENTIONS:  -  Assess patient's ability to carry out ADLs; assess patient's baseline for ADL function and identify physical deficits which impact ability to perform ADLs (bathing, care of mouth/teeth, toileting, grooming, dressing, etc )  - Assess/evaluate cause of self-care deficits   - Assess range of motion  - Assess patient's mobility; develop plan if impaired  - Assess patient's need for assistive devices and provide as appropriate  - Encourage maximum independence but intervene and supervise when necessary  - Involve family in performance of ADLs  - Assess for home care needs following discharge   - Consider OT consult to assist with ADL evaluation and planning for discharge  - Provide patient education as appropriate  Outcome: Progressing  Goal: Maintains/Returns to pre admission functional level  Description: INTERVENTIONS:  - Perform BMAT or MOVE assessment daily    - Set and communicate daily mobility goal to care team and patient/family/caregiver  - Collaborate with rehabilitation services on mobility goals if consulted  - Perform Range of Motion  times a day  - Reposition patient every  hours    - Dangle patient  times a day  - Stand patient  times a day  - Ambulate patient  times a day  - Out of bed to chair  times a day   - Out of bed for meals times a day  - Out of bed for toileting  - Record patient progress and toleration of activity level   Outcome: Progressing

## 2023-03-23 NOTE — ASSESSMENT & PLAN NOTE
Wt Readings from Last 3 Encounters:   03/24/23 73 9 kg (163 lb)   03/13/23 73 2 kg (161 lb 6 4 oz)   03/01/23 73 5 kg (162 lb)     Currently hypovolemic, will need IVF for severe sepsis  monitor resp status  Hold diuretics    Echo done on 5/2/2022 showing EF of 72% systolic function low normal grade 2 diastolic dysfunction aortic valve biprosthetic valve  I's and O's  Daily wts  DM low salt diet

## 2023-03-24 LAB
ALBUMIN SERPL BCP-MCNC: 3.4 G/DL (ref 3.5–5)
ALP SERPL-CCNC: 70 U/L (ref 34–104)
ALT SERPL W P-5'-P-CCNC: 7 U/L (ref 7–52)
ANION GAP SERPL CALCULATED.3IONS-SCNC: 8 MMOL/L (ref 4–13)
AST SERPL W P-5'-P-CCNC: 12 U/L (ref 13–39)
ATRIAL RATE: 91 BPM
ATRIAL RATE: 91 BPM
BACTERIA BLD CULT: NORMAL
BILIRUB SERPL-MCNC: 0.55 MG/DL (ref 0.2–1)
BUN SERPL-MCNC: 11 MG/DL (ref 5–25)
CALCIUM ALBUM COR SERPL-MCNC: 9 MG/DL (ref 8.3–10.1)
CALCIUM SERPL-MCNC: 8.5 MG/DL (ref 8.4–10.2)
CHLORIDE SERPL-SCNC: 103 MMOL/L (ref 96–108)
CO2 SERPL-SCNC: 26 MMOL/L (ref 21–32)
CREAT SERPL-MCNC: 0.81 MG/DL (ref 0.6–1.3)
ERYTHROCYTE [DISTWIDTH] IN BLOOD BY AUTOMATED COUNT: 15.2 % (ref 11.6–15.1)
GFR SERPL CREATININE-BSD FRML MDRD: 86 ML/MIN/1.73SQ M
GLUCOSE SERPL-MCNC: 110 MG/DL (ref 65–140)
GLUCOSE SERPL-MCNC: 116 MG/DL (ref 65–140)
GLUCOSE SERPL-MCNC: 157 MG/DL (ref 65–140)
GLUCOSE SERPL-MCNC: 158 MG/DL (ref 65–140)
GLUCOSE SERPL-MCNC: 233 MG/DL (ref 65–140)
HCT VFR BLD AUTO: 35.5 % (ref 36.5–49.3)
HGB BLD-MCNC: 11.7 G/DL (ref 12–17)
IGA SERPL-MCNC: <8 MG/DL (ref 70–400)
IGG SERPL-MCNC: 451 MG/DL (ref 700–1600)
IGM SERPL-MCNC: 36 MG/DL (ref 40–230)
MAGNESIUM SERPL-MCNC: 1.7 MG/DL (ref 1.9–2.7)
MCH RBC QN AUTO: 29 PG (ref 26.8–34.3)
MCHC RBC AUTO-ENTMCNC: 33 G/DL (ref 31.4–37.4)
MCV RBC AUTO: 88 FL (ref 82–98)
MRSA NOSE QL CULT: NORMAL
P AXIS: 76 DEGREES
P AXIS: 76 DEGREES
PLATELET # BLD AUTO: 276 THOUSANDS/UL (ref 149–390)
PMV BLD AUTO: 9.3 FL (ref 8.9–12.7)
POTASSIUM SERPL-SCNC: 3.5 MMOL/L (ref 3.5–5.3)
PR INTERVAL: 190 MS
PR INTERVAL: 190 MS
PROCALCITONIN SERPL-MCNC: 0.46 NG/ML
PROT SERPL-MCNC: 6.3 G/DL (ref 6.4–8.4)
QRS AXIS: -40 DEGREES
QRS AXIS: -43 DEGREES
QRSD INTERVAL: 132 MS
QRSD INTERVAL: 132 MS
QT INTERVAL: 424 MS
QT INTERVAL: 432 MS
QTC INTERVAL: 521 MS
QTC INTERVAL: 531 MS
RBC # BLD AUTO: 4.04 MILLION/UL (ref 3.88–5.62)
SODIUM SERPL-SCNC: 137 MMOL/L (ref 135–147)
T WAVE AXIS: 83 DEGREES
T WAVE AXIS: 83 DEGREES
VANCOMYCIN SERPL-MCNC: 13 UG/ML (ref 10–20)
VENTRICULAR RATE: 91 BPM
VENTRICULAR RATE: 91 BPM
WBC # BLD AUTO: 15.01 THOUSAND/UL (ref 4.31–10.16)

## 2023-03-24 RX ORDER — SODIUM CHLORIDE FOR INHALATION 3 %
4 VIAL, NEBULIZER (ML) INHALATION
Status: DISCONTINUED | OUTPATIENT
Start: 2023-03-24 | End: 2023-03-29 | Stop reason: HOSPADM

## 2023-03-24 RX ORDER — MAGNESIUM SULFATE HEPTAHYDRATE 40 MG/ML
2 INJECTION, SOLUTION INTRAVENOUS ONCE
Status: COMPLETED | OUTPATIENT
Start: 2023-03-24 | End: 2023-03-24

## 2023-03-24 RX ORDER — LEVALBUTEROL 1.25 MG/.5ML
1.25 SOLUTION, CONCENTRATE RESPIRATORY (INHALATION)
Status: DISCONTINUED | OUTPATIENT
Start: 2023-03-24 | End: 2023-03-29 | Stop reason: HOSPADM

## 2023-03-24 RX ORDER — POTASSIUM CHLORIDE 20 MEQ/1
40 TABLET, EXTENDED RELEASE ORAL 2 TIMES DAILY
Status: COMPLETED | OUTPATIENT
Start: 2023-03-24 | End: 2023-03-24

## 2023-03-24 RX ADMIN — ENOXAPARIN SODIUM 40 MG: 100 INJECTION SUBCUTANEOUS at 08:32

## 2023-03-24 RX ADMIN — INSULIN LISPRO 1 UNITS: 100 INJECTION, SOLUTION INTRAVENOUS; SUBCUTANEOUS at 17:33

## 2023-03-24 RX ADMIN — ATORVASTATIN CALCIUM 40 MG: 40 TABLET, FILM COATED ORAL at 17:29

## 2023-03-24 RX ADMIN — CEFEPIME HYDROCHLORIDE 2000 MG: 2 INJECTION, SOLUTION INTRAVENOUS at 23:56

## 2023-03-24 RX ADMIN — CEFEPIME HYDROCHLORIDE 2000 MG: 2 INJECTION, SOLUTION INTRAVENOUS at 17:29

## 2023-03-24 RX ADMIN — SODIUM CHLORIDE SOLN NEBU 3% 4 ML: 3 NEBU SOLN at 14:15

## 2023-03-24 RX ADMIN — GUAIFENESIN 600 MG: 600 TABLET ORAL at 08:32

## 2023-03-24 RX ADMIN — INSULIN LISPRO 1 UNITS: 100 INJECTION, SOLUTION INTRAVENOUS; SUBCUTANEOUS at 12:15

## 2023-03-24 RX ADMIN — PANTOPRAZOLE SODIUM 40 MG: 40 TABLET, DELAYED RELEASE ORAL at 04:33

## 2023-03-24 RX ADMIN — POTASSIUM CHLORIDE 40 MEQ: 1500 TABLET, EXTENDED RELEASE ORAL at 08:32

## 2023-03-24 RX ADMIN — LEVALBUTEROL HYDROCHLORIDE 1.25 MG: 1.25 SOLUTION, CONCENTRATE RESPIRATORY (INHALATION) at 14:15

## 2023-03-24 RX ADMIN — VANCOMYCIN HYDROCHLORIDE 750 MG: 750 INJECTION, SOLUTION INTRAVENOUS at 12:07

## 2023-03-24 RX ADMIN — GUAIFENESIN 600 MG: 600 TABLET ORAL at 20:40

## 2023-03-24 RX ADMIN — VANCOMYCIN HYDROCHLORIDE 750 MG: 750 INJECTION, SOLUTION INTRAVENOUS at 20:40

## 2023-03-24 RX ADMIN — CEFEPIME HYDROCHLORIDE 2000 MG: 2 INJECTION, SOLUTION INTRAVENOUS at 08:32

## 2023-03-24 RX ADMIN — ASPIRIN 81 MG: 81 TABLET, COATED ORAL at 08:32

## 2023-03-24 RX ADMIN — MAGNESIUM SULFATE HEPTAHYDRATE 2 G: 2 INJECTION, SOLUTION INTRAVENOUS at 09:47

## 2023-03-24 RX ADMIN — LEVALBUTEROL HYDROCHLORIDE 1.25 MG: 1.25 SOLUTION, CONCENTRATE RESPIRATORY (INHALATION) at 19:42

## 2023-03-24 RX ADMIN — METOPROLOL SUCCINATE 100 MG: 50 TABLET, EXTENDED RELEASE ORAL at 08:32

## 2023-03-24 RX ADMIN — INSULIN LISPRO 2 UNITS: 100 INJECTION, SOLUTION INTRAVENOUS; SUBCUTANEOUS at 21:58

## 2023-03-24 RX ADMIN — POTASSIUM CHLORIDE 40 MEQ: 1500 TABLET, EXTENDED RELEASE ORAL at 17:29

## 2023-03-24 RX ADMIN — VALACYCLOVIR HYDROCHLORIDE 500 MG: 500 TABLET, FILM COATED ORAL at 08:32

## 2023-03-24 RX ADMIN — SODIUM CHLORIDE SOLN NEBU 3% 4 ML: 3 NEBU SOLN at 19:42

## 2023-03-24 NOTE — UTILIZATION REVIEW
Initial Clinical Review    Admission: Date/Time/Statement:   Admission Orders (From admission, onward)     Ordered        03/23/23 1009  INPATIENT ADMISSION  Once                      Orders Placed This Encounter   Procedures   • INPATIENT ADMISSION     Standing Status:   Standing     Number of Occurrences:   1     Order Specific Question:   Level of Care     Answer:   Med Surg [16]     Order Specific Question:   Estimated length of stay     Answer:   More than 2 Midnights     Order Specific Question:   Certification     Answer:   I certify that inpatient services are medically necessary for this patient for a duration of greater than two midnights  See H&P and MD Progress Notes for additional information about the patient's course of treatment  ED Arrival Information     Expected   -    Arrival   3/23/2023 07:09    Acuity   Urgent            Means of arrival   Walk-In    Escorted by   Self    Service   Hospitalist    Admission type   Emergency            Arrival complaint   Fever, Labored Breathing            Chief Complaint   Patient presents with   • Shortness of Breath     To ED with c/o cold sx x 3 weeks  Was on antibiotic  States that he developed a fever yesterday and increased SOB, coughing as well       Initial Presentation: 69 y o  male from home to ED admitted inpatient due to Sepsis/pneumonia of RLL/acute respiratory insuffiencey  PMH of MM on monthly chemo and dexamethasone, HTN, HLD, Aifb, CHF  Presented due to cough, congestion, fatigue starting 3 weeks prior to arrival   Has completed course of doxycycline about 2 days ago and since then worsening cough, fevers  Temp to 101 6  On exam tachycardia, tachypnea  Lungs rhonchi  D dimer 1 38  Lactic acid 2 3    K 3  Wbc 17 94  CxR with Right upper and middle lobe pneumonia  Congestive changes  In the ED given IVF bolus, cefepime and vancomycin, KCL  Oxygen 4 liters  plan is continued IVF, vancomycin and cefepime    Consult ID   Wean oxygen as able  Replete K, trend BMP  Diuretics on hold  Start accuchecks with SSI  Date: 3/24/23   Day 2: max temp 99  On exam lungs decreased bilaterally, scattered rhonchi  Incentive spirometry 1250 ml and goal is 1500 ml  Telemetry sinus  Procalcitonin 0 46  Mg 1 7  Wbc 15 01  Replete Mg  Continue cefepime and vancomycin  Consult Pulmonary  3/24/23 per ID - Patient has fever/pneumonia/Multiple Myeloma  Plan is continue cefepime and vancomycin  Recommend pulmonary consult with persistent/new infiltrates  3/24/23 per Pulmonary - patient with acute hypoxic respiratory failure/abnormal ct chest with diffuse alveolar infiltrates, more on right, interlobular thickening, diffuse ground glass opacities/recurrent infections  History of COVID 19, DAH, pneumonia, Multiple Myeloma and CHF  Differential is Infectious pneumonia, bacterial, atypical, PJP, Aspergillus, fungal   Diffuse alveolar hemorrhage  Chronic eosinophilic pneumonia  Immune mediated organizing pneumonia  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  Less likely extension of hematologic malignancies  Plan is check immunoglobulins, aspergillus antibodies, aspergillus galactomannan antigen  BAL on 3/28  Continue antibiotics       ED Triage Vitals [03/23/23 0721]   Temperature Pulse Respirations Blood Pressure SpO2   99 2 °F (37 3 °C) 97 20 124/58 91 %      Temp Source Heart Rate Source Patient Position - Orthostatic VS BP Location FiO2 (%)   Temporal Monitor Lying Right arm --      Pain Score       No Pain          Wt Readings from Last 1 Encounters:   03/24/23 73 9 kg (163 lb)     Additional Vital Signs:   03/24/23 08:19:45 98 6 °F (37 °C) 81 -- 118/60 79 92 % -- --   03/23/23 20:20:43 98 5 °F (36 9 °C) 83 -- 149/68 95 92 % -- --   03/23/23 1500 -- -- -- -- -- 93 % None (Room air) --   03/23/23 14:44:17 98 5 °F (36 9 °C) 82 17 108/62 77 93 %       Pertinent Labs/Diagnostic Test Results:   CTA ED chest PE Study   Final Result by Saqib Juárez MD (03/23 1006)      No pulmonary embolus  Severe multifocal right pneumonia  Moderate interstitial edema  Stable small loculated left and trace right pleural effusion  Workstation performed: [id number]         XR chest pa & lateral   ED Interpretation by Rakan Taylor DO (03/23 1874)   Abnormal   Right upper and middle lobe consolidation concerning for PNA as interpreted by me independently       Final Result by Yadira Kumari MD (03/23 6716)      Infiltrates in the right mid and lower lung  Mild congestive changes                    Workstation performed: [id number]           3/23/23 ecg - Interpretation: non-specific     Rate:     ECG rate:  91     ECG rate assessment: normal     Rhythm:     Rhythm: sinus rhythm     Ectopy:     Ectopy: none     QRS:     QRS axis:  Left     QRS intervals:  Wide   Conduction:     Conduction: abnormal       Abnormal conduction: complete LBBB     ST segments:     ST segments:  Non-specific   T waves:     T waves: non-specific     Comments:      qtc 531  Results from last 7 days   Lab Units 03/23/23  1225 03/23/23  0739   SARS-COV-2  Not Detected Negative     Results from last 7 days   Lab Units 03/24/23  0433 03/23/23  0739   WBC Thousand/uL 15 01* 17 94*   HEMOGLOBIN g/dL 11 7* 12 2   HEMATOCRIT % 35 5* 37 4   PLATELETS Thousands/uL 276 308   NEUTROS ABS Thousands/µL  --  14 09*     Results from last 7 days   Lab Units 03/24/23  0433 03/23/23  0739   SODIUM mmol/L 137 139   POTASSIUM mmol/L 3 5 3 0*   CHLORIDE mmol/L 103 99   CO2 mmol/L 26 28   ANION GAP mmol/L 8 12   BUN mg/dL 11 17   CREATININE mg/dL 0 81 0 91   EGFR ml/min/1 73sq m 86 81   CALCIUM mg/dL 8 5 9 0   MAGNESIUM mg/dL 1 7*  --      Results from last 7 days   Lab Units 03/24/23  0433 03/23/23  0739   AST U/L 12* 13   ALT U/L 7 9   ALK PHOS U/L 70 88   TOTAL PROTEIN g/dL 6 3* 6 6   ALBUMIN g/dL 3 4* 3 8   TOTAL BILIRUBIN mg/dL 0  55 0 50     Results from last 7 days   Lab Units 03/24/23  1121 03/24/23  0734 03/23/23  1558 03/23/23  1233   POC GLUCOSE mg/dl 158* 110 133 125     Results from last 7 days   Lab Units 03/24/23  0433 03/23/23  0739   GLUCOSE RANDOM mg/dL 116 186*     Results from last 7 days   Lab Units 03/23/23  1230 03/23/23  0941 03/23/23  0739   HS TNI 0HR ng/L  --   --  82*   HS TNI 2HR ng/L  --  112*  --    HSTNI D2 ng/L  --  30*  --    HS TNI 4HR ng/L 102*  --   --    HSTNI D4 ng/L 20*  --   --      Results from last 7 days   Lab Units 03/23/23  0739   D-DIMER QUANTITATIVE ug/ml FEU 1 38*     Results from last 7 days   Lab Units 03/23/23  0739   PROTIME seconds 13 7   INR  0 98   PTT seconds 27     Results from last 7 days   Lab Units 03/23/23  0739   TSH 3RD GENERATON uIU/mL 7 168*     Results from last 7 days   Lab Units 03/24/23  0433 03/23/23  0739   PROCALCITONIN ng/ml 0 46* 0 19     Results from last 7 days   Lab Units 03/23/23  0941 03/23/23  0739   LACTIC ACID mmol/L 1 4 2 3*     Results from last 7 days   Lab Units 03/23/23  0739   BNP pg/mL 326*     Results from last 7 days   Lab Units 03/23/23  0941   CLARITY UA  Clear   COLOR UA  Yellow   SPEC GRAV UA  <1 005*   PH UA  5 0   GLUCOSE UA mg/dl 1000 (1%)*   KETONES UA mg/dl Negative   BLOOD UA  Negative   PROTEIN UA mg/dl Negative   NITRITE UA  Negative   BILIRUBIN UA  Negative   UROBILINOGEN UA (BE) mg/dl <2 0   LEUKOCYTES UA  Negative     Results from last 7 days   Lab Units 03/23/23  1225 03/23/23  1056 03/23/23  0739   STREP PNEUMONIAE ANTIGEN, URINE   --  Negative  --    LEGIONELLA URINARY ANTIGEN   --  Negative  --    INFLUENZA A PCR   --   --  Negative   INFLUENZA B PCR   --   --  Negative   RSV PCR   --   --  Negative   RESPIRATORY SYNCYTIAL VIRUS  Not Detected  --   --      Results from last 7 days   Lab Units 03/23/23  1225   ADENOVIRUS  Not Detected   BORDETELLA PARAPERTUSSIS  Not Detected   BORDETELLA PERTUSSIS  Not Detected   CHLAMYDIA PNEUMONIAE Not Detected   CORONAVIRUS 229E  Not Detected   CORONAVIRUS HKU1  Not Detected   CORONAVIRUS NL63  Not Detected   CORONAVIRUS OC43  Not Detected   METAPNEUMOVIRUS  Not Detected   RHINOVIRUS  Not Detected   MYCOPLASMA PNEUMONIAE  Not Detected   PARAINFLUENZA 1  Not Detected   PARAINFLUENZA 2  Not Detected   PARAINFLUENZA 3  Not Detected   PARAINFLUENZA 4  Not Detected     Results from last 7 days   Lab Units 03/23/23  0739   BLOOD CULTURE  Received in Microbiology Lab  Culture in Progress  Received in Microbiology Lab  Culture in Progress         ED Treatment:   Medication Administration from 03/23/2023 0708 to 03/23/2023 1441       Date/Time Order Dose Route Action Comments     03/23/2023 0939 EDT sodium chloride 0 9 % bolus 500 mL 0 mL Intravenous Stopped --     03/23/2023 7699 EDT sodium chloride 0 9 % bolus 500 mL 500 mL Intravenous New Bag --     03/23/2023 0850 EDT cefepime (MAXIPIME) IVPB (premix in dextrose) 2,000 mg 50 mL 0 mg Intravenous Stopped --     03/23/2023 0816 EDT cefepime (MAXIPIME) IVPB (premix in dextrose) 2,000 mg 50 mL 2,000 mg Intravenous New Bag --     03/23/2023 1200 EDT vancomycin (VANCOCIN) 1,750 mg in sodium chloride 0 9 % 500 mL IVPB 0 mg Intravenous Stopped --     03/23/2023 0837 EDT vancomycin (VANCOCIN) 1,750 mg in sodium chloride 0 9 % 500 mL IVPB 1,750 mg Intravenous New Bag --     03/23/2023 0940 EDT sodium chloride 0 9 % bolus 500 mL 500 mL Intravenous New Bag --     03/23/2023 1057 EDT potassium chloride (K-DUR,KLOR-CON) CR tablet 40 mEq 40 mEq Oral Given --     03/23/2023 1057 EDT aspirin chewable tablet 324 mg 324 mg Oral Given --     03/23/2023 1239 EDT multi-electrolyte (PLASMALYTE-A/ISOLYTE-S PH 7 4) IV solution 50 mL/hr Intravenous New Bag --     03/23/2023 1254 EDT guaiFENesin (MUCINEX) 12 hr tablet 600 mg 600 mg Oral Given --     03/23/2023 1255 EDT benzonatate (TESSALON PERLES) capsule 100 mg 100 mg Oral Given --     03/23/2023 2898 EDT diphenhydrAMINE (BENADRYL) injection 25 mg 25 mg Intravenous Given --     03/23/2023 1257 EDT insulin lispro (HumaLOG) 100 units/mL subcutaneous injection 1-5 Units 1 Units Subcutaneous Not Given --     03/23/2023 1256 EDT aluminum-magnesium hydroxide-simethicone (MYLANTA) oral suspension 30 mL 30 mL Oral Given --     03/23/2023 1416 EDT aspirin (ECOTRIN LOW STRENGTH) EC tablet 81 mg 81 mg Oral Not Given Patient took 81 at home and 324 here today     03/23/2023 1255 EDT metoprolol succinate (TOPROL-XL) 24 hr tablet 100 mg 100 mg Oral Given --     03/23/2023 1305 EDT pantoprazole (PROTONIX) EC tablet 40 mg 40 mg Oral Given --     03/23/2023 1255 EDT valACYclovir (VALTREX) tablet 500 mg 500 mg Oral Given --     03/23/2023 1256 EDT enoxaparin (LOVENOX) subcutaneous injection 40 mg 40 mg Subcutaneous Given --        Past Medical History:   Diagnosis Date   • Acute respiratory failure with hypoxia (HCC) 04/20/2022   • Arthritis    • Cancer (HCC)    • Cataract    • Colitis    • Colon polyp    • [facility] mellitus ([facility] )    • Fatty liver    • GERD (gastroesophageal reflux disease)    • History of chemotherapy    • History of radiation therapy    • History of shingles 2018   • History of transfusion    • Hyperlipidemia    • Hypertension    • 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[facility] )      Present on Admission:  • Type 2 diabetes mellitus without complication, without long-term current use of insulin (HCC)  • Dyslipidemia  • Multiple myeloma not having achieved remission (HCC)  • Paroxysmal atrial fibrillation (HCC)  • Essential hypertension  • Chronic diastolic congestive heart failure (HCC)  • Hypokalemia      Admitting Diagnosis: Shortness of breath [R06 02]  Hypokalemia [E87 6]  Pneumonia [J18 9]  Elevated troponin [R77 8]  Acute respiratory failure with hypoxia (HCC) [J96 01]  Severe sepsis ([facility] ) [A41 9, R65 20]  Failure of outpatient treatment [Z78 9]  Pneumonia of right lower lobe due to infectious organism [J18 9]  Age/Sex: 69 y o  male  Admission Orders:  Scheduled Medications:  aspirin, 81 mg, Oral, Daily  atorvastatin, 40 mg, Oral, Daily With Dinner  cefepime, 2,000 mg, Intravenous, Q8H  enoxaparin, 40 mg, Subcutaneous, Daily  guaiFENesin, 600 mg, Oral, Q12H DONNIE  insulin lispro, 1-5 Units, Subcutaneous, TID AC  insulin lispro, 1-5 Units, Subcutaneous, HS  metoprolol succinate, 100 mg, Oral, Daily  pantoprazole, 40 mg, Oral, Early Morning  potassium chloride, 40 mEq, Oral, BID  valACYclovir, 500 mg, Oral, Daily  vancomycin, 750 mg, Intravenous, Q12H      Continuous IV Infusions:     PRN Meds:  acetaminophen, 650 mg, Oral, Q6H PRN  aluminum-magnesium hydroxide-simethicone, 30 mL, Oral, Q4H PRN  benzonatate, 100 mg, Oral, TID PRN  diphenhydrAMINE, 25 mg, Intravenous, Q6H PRN  polyethylene glycol, 17 g, Oral, Daily PRN  trimethobenzamide, 200 mg, Intramuscular, Q6H PRN        IP CONSULT TO INFECTIOUS DISEASES  IP CONSULT TO PULMONOLOGY    Network Utilization Review Department  ATTENTION: Please call with any questions or concerns to [phone] and carefully listen to the prompts so that you are directed to the right person  All voicemails are confidential   [facility]ero [facility]ispe all requests for admission clinical reviews, approved or denied determinations and any other requests to dedicated fax number below belonging to the campus where the patient is receiving treatment   List of dedicated fax numbers for the Facilities:  [address] DENIALS (Administrative/Medical Necessity) [phone]   [address] (Maternity/NICU/Pediatrics) [phone]   [address] [facility] 150 Medical [place] [address] Marisol Campos [place] [address] [address] 134 248 [facility] [phone]

## 2023-03-24 NOTE — CONSULTS
Consultation - Infectious Disease   Marcey Gitelman 69 y o  male MRN: [id number]  Unit/Bed#: -01 Encounter: [id number]      Assessment/Plan      Fever/Pneumonia/Multiple myeloma    Patient admitted with fever increased cough, hypoxia, I suspect from acute bacterial pulmonary infection  Patient with  rapid improvement on vanco and cefepime  Patient's blood cultures are negative  Strep and Legionella ag negative  RP2 panel negative  Patient's blood cultures are negative and MRSA nares is pending  Would continue vancomycin and cefepime for now, await MRSA nares and d/c vanco if negative  Not clear to me how much his pulmonary infiltrates are related to acute infection  Not sure he didn't have some CHF  He has had infilrates though in slightly different distributions for some time  Not clear if this is tumor or atypical infection  - continue cefepime and vancomycin  - monitor for toxicities while on this medication  - f/u MRSA nares, if negative, d/c vancomycin  - f/u blood cultures  - would ask pulmonary to see, given persistent/new infiltrates, known to Dr Saw Alvarez  - would be nice to get chemo medications from [facility]  - follow cbc and fever curve     D/W primary team     History of Present Illness   Physician Requesting Consult: Sepideh Rick MD  Reason for Consult / Principal Problem: pna    HPI: Marcey Gitelman is a 69y o  year old male with CHF, multiple myeloma, recurrent pna  He is followed by Dr Saw Alvarez  Patient gets monthly chemotherapy at [facility]  Gets 5 bags and dexamethasone, he does not have a port  Last course about a month ago  Patient reports exposure to URI's  Patient developed increasing cough about a week ago, and then became febrile and hypoxic and came to ER  He had a WBC of 17,000, requiring 4L of O2  Patient placed on vancomycin and cefepime, with improvement of fever and hypoxia at 48 hours    Patient's urine strep ag, urine legionella negative, RP2 panel also negative  COVID/FLU negative  CT done with persistent infiltrates in R lung, present in October though also with new infiltrates, patient has had infiltrates at least since April 2022 as well but with different distribution  Patient has no ha, chest pains, abdominal pains, n/v/d  Consults    ROS: 12 systems reviewed, remainder is neg      Historical Information   Past Medical History:   Diagnosis Date   • Acute respiratory failure with hypoxia ([facility] ) 04/20/2022   • Arthritis    • Cancer 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[id number] )      Past Surgical History:   Procedure Laterality Date   • AORTIC VALVE REPLACEMENT     • CARDIAC VALVE REPLACEMENT  2014    aorta   • CATARACT EXTRACTION Bilateral    • COLECTOMY     • COLONOSCOPY  11/2019    Prior right hemicolectomy   • CORONARY ARTERY BYPASS GRAFT     • DENTAL SURGERY     • JOINT REPLACEMENT  January 2019    left knee   • KNEE SURGERY     • LYMPH NODE BIOPSY  2003   • LYMPHADENECTOMY     • OTHER SURGICAL HISTORY      MAZE PROCEDURE   • NJ ARTHRP KNE CONDYLE&PLATU MEDIAL&LAT COMPARTMENTS Left 01/28/2019    Procedure: ARTHROPLASTY LEFT KNEE TOTAL;  Surgeon: Susie Hauser MD;  Location:  MAIN OR;  Service: Orthopedics   • NJ LARYNGOSCOPY DIRECT OPERATIVE W/BIOPSY Right 04/27/2022    Procedure: DIRECT LARYNGOSCOPY WITH BIOPSY RIGHT PHARYNX, POSSIBLE RIGHT NECK LYMPH NODE BIOPSY; FROZEN SECTION;  Surgeon: Yuko Hooker MD;  Location:  MAIN OR;  Service: ENT   • SKIN BIOPSY     • UPPER GASTROINTESTINAL ENDOSCOPY  11/2019    Schatzki ring   • US GUIDED LYMPH NODE BIOPSY RIGHT  12/14/2021     Social History   Social History     Substance and Sexual Activity   Alcohol Use Yes   • "Alcohol/week: 2 0 standard drinks   • Types: 2 Cans of beer per week    Comment: very rare \"beer here and there\"     Social History     Substance and Sexual Activity   Drug Use No     Social History     Tobacco Use   Smoking Status Former   • Packs/day: 1 00   • Years: 40 00   • Pack years: 40 00   • Types: Cigarettes   • Start date: 47   • Quit date: 2009   • Years since quittin 2   Smokeless Tobacco Never     Family History   Problem Relation Age of Onset   • Heart block Family    • Coronary artery disease Mother    • Thrombosis Mother    • Hypertension Mother    • Arthritis Mother    • Hyperlipidemia Mother    • Diabetes Father    • Hypertension Father    • Arthritis Father    • Sudden death Father         cardiac   • Diabetes type II Father    • Colon cancer Paternal Grandfather    • Cancer Paternal Grandfather    • Breast cancer Sister    • Heart disease Brother         Coronary stents       Meds/Allergies   MEDS: reviewed       Current Facility-Administered Medications:   •  acetaminophen (TYLENOL) tablet 650 mg, 650 mg, Oral, Q6H PRN, Jessica Galeas MD  •  aluminum-magnesium hydroxide-simethicone (MYLANTA) oral suspension 30 mL, 30 mL, Oral, Q4H PRN, Jessica Galeas MD, 30 mL at 23 1256  •  aspirin (ECOTRIN LOW STRENGTH) EC tablet 81 mg, 81 mg, Oral, Daily, Jessica Galeas MD, 81 mg at 23 6361  •  atorvastatin (LIPITOR) tablet 40 mg, 40 mg, Oral, Daily With Dinner, Jessica Galeas MD  •  benzonatate (TESSALON PERLES) capsule 100 mg, 100 mg, Oral, TID PRN, Jessica Galeas MD, 100 mg at 23 1255  •  cefepime (MAXIPIME) IVPB (premix in dextrose) 2,000 mg 50 mL, 2,000 mg, Intravenous, Q8H, Jessica Galeas MD, Last Rate: 100 mL/hr at 23 0832, 2,000 mg at 23 9506  •  diphenhydrAMINE (BENADRYL) injection 25 mg, 25 mg, Intravenous, Q6H PRN, Jessica Galeas MD, 25 mg at 23 1256  •  enoxaparin (LOVENOX) subcutaneous injection 40 mg, 40 mg, Subcutaneous, Daily, Shona" Adeline Ponce MD, 40 mg at 03/24/23 1965  •  guaiFENesin (MUCINEX) 12 hr tablet 600 mg, 600 mg, Oral, Q12H [facility], Jacki Howard MD, 600 mg at 03/24/23 6708  •  insulin lispro (HumaLOG) 100 units/mL subcutaneous injection 1-5 Units, 1-5 Units, Subcutaneous, TID AC **AND** Fingerstick Glucose (POCT), , , TID AC, Jacki Howard MD  •  insulin lispro (HumaLOG) 100 units/mL subcutaneous injection 1-5 Units, 1-5 Units, Subcutaneous, HS, Jacki Howard MD  •  magnesium sulfate 2 g/50 mL IVPB (premix) 2 g, 2 g, Intravenous, Once, Jacki Howard MD, Last Rate: 25 mL/hr at 03/24/23 0947, 2 g at 03/24/23 0947  •  metoprolol succinate (TOPROL-XL) 24 hr tablet 100 mg, 100 mg, Oral, Daily, Jacki Howard MD, 100 mg at 03/24/23 1094  •  multi-electrolyte (PLASMALYTE-A/ISOLYTE-S PH 7 4) IV solution, 50 mL/hr, Intravenous, Continuous, Jacki Howard MD, Last Rate: 50 mL/hr at 03/23/23 1837, 50 mL/hr at 03/23/23 1837  •  pantoprazole (PROTONIX) EC tablet 40 mg, 40 mg, Oral, Early Morning, Jacki Howard MD, 40 mg at 03/24/23 0433  •  polyethylene glycol (MIRALAX) packet 17 g, 17 g, Oral, Daily PRN, Jacki Howard MD  •  potassium chloride (K-DUR,KLOR-CON) CR tablet 40 mEq, 40 mEq, Oral, BID, Jacki Howard MD, 40 mEq at 03/24/23 1613  •  trimethobenzamide (TIGAN) IM injection 200 mg, 200 mg, Intramuscular, Q6H PRN, Jacki Howard MD  •  valACYclovir (VALTREX) tablet 500 mg, 500 mg, Oral, Daily, Jacki Howard MD, 500 mg at 03/24/23 5872  •  vancomycin (VANCOCIN) IVPB (premix in dextrose) 750 mg 150 mL, 750 mg, Intravenous, Q12H, Jacki Howard MD, Last Rate: 150 mL/hr at 03/23/23 1931, 750 mg at 03/23/23 1931    Allergies   Allergen Reactions   • Ceftin [Cefuroxime] Itching     However, has tolerated Cefazolin and Pip-Tazo since, which have different side chains  Be cautious with / avoid 2nd, 3rd, or 4th Gen Cephs that have similar side chains to Cefuroxime     • Lantus [Insulin Glargine] Itching         Intake/Output Summary (Last 24 hours) at 3/24/2023 1031  Last data filed at 3/24/2023 0947  Gross per 24 hour   Intake 150 ml   Output 1000 ml   Net -850 ml       PE:  WD, WN, WF in NAD  VSS, Tmax: 99 0  HEENT: anicteric  NECK: supple, no adenopathy  CARDIAC: rrr s1s2  LUNGS:  Decreased bilaterally, scatter rhonchi  ABDOMEN: soft nt/nd + bs  EXTREMITIES: no edema  SKIN: no rashes  NEURO: grossly non-focal     Invasive Devices:   Peripheral IV 03/23/23 Right Antecubital (Active)   Site Assessment WDL 03/23/23 1600   Dressing Type Transparent 03/23/23 1600   Line Status Flushed 03/23/23 1600   Dressing Status Clean;Dry; Intact 03/23/23 1600   Reason Not Rotated Not due 03/23/23 1600           Lab Results:   Admission on 03/23/2023   Component Date Value   • WBC 03/23/2023 17 94 (H)    • RBC 03/23/2023 4 24    • Hemoglobin 03/23/2023 12 2    • Hematocrit 03/23/2023 37 4    • MCV 03/23/2023 88    • MCH 03/23/2023 28 8    • MCHC 03/23/2023 32 6    • RDW 03/23/2023 14 6    • MPV 03/23/2023 9 7    • Platelets 81/80/8162 308    • nRBC 03/23/2023 0    • Neutrophils Relative 03/23/2023 78 (H)    • Immat GRANS % 03/23/2023 1    • Lymphocytes Relative 03/23/2023 8 (L)    • Monocytes Relative 03/23/2023 8    • Eosinophils Relative 03/23/2023 4    • Basophils Relative 03/23/2023 1    • Neutrophils Absolute 03/23/2023 14 09 (H)    • Immature Grans Absolute 03/23/2023 0 11    • Lymphocytes Absolute 03/23/2023 1 50    • Monocytes Absolute 03/23/2023 1 48 (H)    • Eosinophils Absolute 03/23/2023 0 63 (H)    • Basophils Absolute 03/23/2023 0 13 (H)    • Sodium 03/23/2023 139    • Potassium 03/23/2023 3 0 (L)    • Chloride 03/23/2023 99    • CO2 03/23/2023 28    • ANION GAP 03/23/2023 12    • BUN 03/23/2023 17    • Creatinine 03/23/2023 0 91    • Glucose 03/23/2023 186 (H)    • Calcium 03/23/2023 9 0    • AST 03/23/2023 13    • ALT 03/23/2023 9    • Alkaline Phosphatase 03/23/2023 88    • Total Protein 03/23/2023 6 6    • Albumin 03/23/2023 3 8    • Total Bilirubin 03/23/2023 0 50    • eGFR 03/23/2023 81    • hs TnI 0hr 03/23/2023 82 (H)    • LACTIC ACID 03/23/2023 2 3 (HH)    • Procalcitonin 03/23/2023 0 19    • Protime 03/23/2023 13 7    • INR 03/23/2023 0 98    • PTT 03/23/2023 27    • Blood Culture 03/23/2023 Received in Microbiology Lab  Culture in Progress  • Blood Culture 03/23/2023 Received in Microbiology Lab  Culture in Progress      • Color, UA 03/23/2023 Yellow    • Clarity, UA 03/23/2023 Clear    • Specific Gravity, UA 03/23/2023 <1 005 (L)    • pH, UA 03/23/2023 5 0    • Leukocytes, UA 03/23/2023 Negative    • Nitrite, UA 03/23/2023 Negative    • Protein, UA 03/23/2023 Negative    • Glucose, UA 03/23/2023 1000 (1%) (A)    • Ketones, UA 03/23/2023 Negative    • Urobilinogen, UA 03/23/2023 <2 0    • Bilirubin, UA 03/23/2023 Negative    • Occult Blood, UA 03/23/2023 Negative    • BNP 03/23/2023 326 (H)    • SARS-CoV-2 03/23/2023 Negative    • INFLUENZA A PCR 03/23/2023 Negative    • INFLUENZA B PCR 03/23/2023 Negative    • RSV PCR 03/23/2023 Negative    • Ventricular Rate 03/23/2023 91    • Atrial Rate 03/23/2023 91    • OH Interval 03/23/2023 190    • QRSD Interval 03/23/2023 132    • QT Interval 03/23/2023 424    • QTC Interval 03/23/2023 521    • P Axis 03/23/2023 76    • QRS Axis 03/23/2023 -43    • T Wave Axis 03/23/2023 83    • Ventricular Rate 03/23/2023 91    • Atrial Rate 03/23/2023 91    • OH Interval 03/23/2023 190    • QRSD Interval 03/23/2023 132    • QT Interval 03/23/2023 432    • QTC Interval 03/23/2023 531    • P Axis 03/23/2023 76    • QRS Axis 03/23/2023 -40    • T Wave Axis 03/23/2023 83    • hs TnI 2hr 03/23/2023 112 (H)    • Delta 2hr hsTnI 03/23/2023 30 (H)    • hs TnI 4hr 03/23/2023 102 (H)    • Delta 4hr hsTnI 03/23/2023 20 (H)    • LACTIC ACID 03/23/2023 1 4    • D-Dimer, Quant 03/23/2023 1 38 (H)    • Adenovirus 03/23/2023 Not Detected    • Bordetella parapertussis 03/23/2023 Not Detected    • Bordetella pertussis 03/23/2023 Not Detected    • Chlamydia pneumoniae 03/23/2023 Not Detected    • SARS-CoV-2 03/23/2023 Not Detected    • Coronavirus 229E 03/23/2023 Not Detected    • Coronavirus HKU1 03/23/2023 Not Detected    • Coronavirus NL63 03/23/2023 Not Detected    • Coronavirus OC43 03/23/2023 Not Detected    • Human Metapneumovirus 03/23/2023 Not Detected    • Rhino/Enterovirus 03/23/2023 Not Detected    • Influenza A 03/23/2023 Not Detected    • Influenza B 03/23/2023 Not Detected    • Mycoplasma pneumoniae 03/23/2023 Not Detected    • Parainfluenza 1 03/23/2023 Not Detected    • Parainfluenza 2 03/23/2023 Not Detected    • Parainfluenza 3 03/23/2023 Not Detected    • Parainfluenza 4 03/23/2023 Not Detected    • Respiratory Syncytial Vi* 03/23/2023 Not Detected    • Legionella Urinary Antig* 03/23/2023 Negative    • Strep pneumoniae antigen* 03/23/2023 Negative    • TSH 3RD GENERATON 03/23/2023 7 168 (H)    • POC Glucose 03/23/2023 125    • POC Glucose 03/23/2023 133    • WBC 03/24/2023 15 01 (H)    • RBC 03/24/2023 4 04    • Hemoglobin 03/24/2023 11 7 (L)    • Hematocrit 03/24/2023 35 5 (L)    • MCV 03/24/2023 88    • MCH 03/24/2023 29 0    • MCHC 03/24/2023 33 0    • RDW 03/24/2023 15 2 (H)    • Platelets 64/61/1527 276    • MPV 03/24/2023 9 3    • Sodium 03/24/2023 137    • Potassium 03/24/2023 3 5    • Chloride 03/24/2023 103    • CO2 03/24/2023 26    • ANION GAP 03/24/2023 8    • BUN 03/24/2023 11    • Creatinine 03/24/2023 0 81    • Glucose 03/24/2023 116    • Calcium 03/24/2023 8 5    • Corrected Calcium 03/24/2023 9 0    • AST 03/24/2023 12 (L)    • ALT 03/24/2023 7    • Alkaline Phosphatase 03/24/2023 70    • Total Protein 03/24/2023 6 3 (L)    • Albumin 03/24/2023 3 4 (L)    • Total Bilirubin 03/24/2023 0 55    • eGFR 03/24/2023 86    • Magnesium 03/24/2023 1 7 (L)    • Procalcitonin 03/24/2023 0 46 (H)    • Vancomycin Rm 03/24/2023 13 0    • POC Glucose 03/24/2023 110      Imaging Studies: I have personally reviewed pertinent films in PACS  EKG, Pathology, and Other Studies: I have personally reviewed pertinent reports  Culture  Lab Results   Component Value Date    BLOODCX Received in Microbiology Lab  Culture in Progress  03/23/2023    BLOODCX Received in Microbiology Lab  Culture in Progress  03/23/2023    BLOODCX No Growth After 5 Days  02/04/2023    BLOODCX No Growth After 5 Days  02/04/2023    BLOODCX No Growth After 5 Days  12/07/2022    BLOODCX No Growth After 5 Days  12/07/2022    BLOODCX No Growth After 5 Days  04/19/2022    BLOODCX Bacillus species NOT anthracis (A) 04/19/2022    BLOODCX No Growth After 5 Days  05/02/2021    BLOODCX No Growth After 5 Days  05/02/2021    BLOODCX No Growth After 5 Days  04/15/2021    BLOODCX No Growth After 5 Days  04/15/2021    BLOODCX No Growth After 5 Days  04/06/2021    BLOODCX No Growth After 5 Days  04/06/2021    BLOODCX No Growth After 5 Days  04/01/2021    BLOODCX No Growth After 5 Days  04/01/2021    BLOODCX No Growth After 5 Days  02/27/2019    BLOODCX No Growth After 5 Days  02/27/2019    BLOODCX No Growth After 5 Days  11/25/2018    BLOODCX No Growth After 5 Days   11/25/2018     No results found for: [facility]   Lab Results   Component Value Date    URINECX No Growth <1000 cfu/mL 02/04/2023     Lab Results   Component Value Date    SPUTUMCULTUR 2+ Growth of 04/19/2022       Principal Problem:    Severe sepsis ([address] )  Active Problems:    Type 2 diabetes mellitus without complication, without long-term current use of insulin (HCC)    Dyslipidemia    Multiple myeloma not having achieved remission (HCC)    Essential hypertension    Chronic diastolic congestive heart failure (HCC)    Paroxysmal atrial fibrillation (HCC)    Hypokalemia    Pneumonia of right lower lobe due to infectious organism    Acute respiratory insufficiency

## 2023-03-24 NOTE — CONSULTS
Consult Note - Pulmonary   Placido Bailon 69 y o  male MRN: [id number]  Unit/Bed#: -01 Encounter: [id number]      Reason for consultation: Abnormal CT of the chest    Requesting physician: Rena Copeland MD    Assessment  Acute hypoxic respiratory failure  Abnormal CT chest with diffuse alveolar infiltrates, more prominent in the right lung, interlobular thickening, diffuse ground glass opacities  Recurrent infections  History of COVID 19 infections  History of DAH  History of strep pneumo pneumonia  Multiple myeloma currently on chemotherapy  Chronic diastolic heart failure    Cancer history  Hodgkin's, non-hodgkins - treated in [id number] - chemotherapy/radiation  Radiation to the neck and eyelid  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  Colon cancer in 2013 with partial colonic resection  Melanoma with excisions int he past    Recommendations: This is a complicated patient, with multiple malignancies noted in his history and currently is undergoing treatment with daratumumab (CD38 binding mAb), Velcade, dexamethasone for multiple myeloma    He has had recurrent infections in the past, albeit with different organisms  His CT chest have regularly shown diffuse patchy groundglass opacities with interlobular thickening  There is also prominent alveolar patchy infiltrates of variable and migratory distribution in his last 3 CT scans, most prominently in the right lung    In the setting of his medical history and recurrent pulmonary infections, these abnormal CT findings represent a broad differential, including: Infectious pneumonia, bacterial, atypical, PJP, Aspergillus, fungal   Diffuse alveolar hemorrhage  Chronic eosinophilic pneumonia  Immune mediated organizing pneumonia  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    Less likely extension of hematologic malignancies    I noted elevated absolute eosinophil counts of 700 in May 2021, 880 in 2018 and 630 on this admission  He had a bronchoscopy with BAL in late May 2022 with 0% eosinophils, but of course that was after significant courses of steroid therapy    As far as respiratory cultures, he had a positive strep pneumo urine antigen in April 2022, positive COVID-19 in April 2021 and December 2022, and positive rhino/enterovirus in Feb 2023    Given the recurrent nature of his respiratory infections, I will check immunoglobulins as a spot check for his humoral immunity response  However this is expectedly going to be deranged due to his history of myeloma, myeloma treatment, IVIG therapy  I will also send for Aspergillus antibodies, Aspergillus galactomannan antigen  I would favor a BAL on Monday with bacterial/fungal/AFB/PJP cultures and cell count to rule out atypical infections, immune mediated processes, noninfectious causes of recurrent patchy alveolar infiltrates including chronic eosinophilic pneumonia, PJP, DAH    In the potential setting that the BAL reveals DAH, I will recheck serologies at this time  This is also going to be likely deranged and difficult to interpret in the setting of multiple myeloma and treatment of multiple myeloma  If BAL was unrevealing, he should require outpatient follow-up CT to evaluate these alveolar right-sided pulmonary infiltrates  He should be considered for transbronchial biopsies if they are persistent  If these infiltrates to represent pulmonary aspergillosis, transbronchial biopsies may be necessary to confirm    Meanwhile, can continue broad-spectrum antibiotics, awaiting finalization of respiratory cultures    We will add bronchodilators with hypertonic saline due to difficulty expectorating mucus, wheezing and coarse breath sounds    I discussed the plan with the patient and his significant other at bedside    History of Present Illness   HPI:  Rodolph Ahumada is a 69 y o  male with a history of multiple myeloma, chronic diastolic heart failure, paroxysmal atrial fibrillation, hypertension, hyperlipidemia, diabetes currently on monthly chemotherapy at [facility] with daratumumab, Velcade, dexamethasone -his last treatment was last Thursday    He had previously been seen by our service in the past with COVID-19 in 2021, lung nodules with adenopathy, recurrent pneumonias  He was first seen in 2018 and reported possible aspiration pna with history of radiation affecting swallowing function at that time  He then had COVID-19 in April 2021 with persistent hypoxic respiratory failure requiring remdesivir and Decadron,  with recurrent hospitalizations due to hypoxic respiratory failure  Had a bronchoscopy in May 2021 during that hospitalization that showed diffuse alveolar hemorrhage of unclear etiology thought to be due to pulmonary edema and Eliquis  PJP was negative as well as ANCA, TANYA, anti-double-stranded DNA, MPO, AL-3, SSA, SSB  He always had a persistent sleep positive rheumatoid factor with negative CCP  He was treated with steroids and discharged in late May 2021  He was weaned off of oxygen and had follow-up in our office in [facility]  Last follow-up with us in February 2020    Since his prolonged hospitalization in May 2021, he was admitted to [facility] in April 2022 for Streptococcus pneumonia, treated with IV Unasyn with improvement    In 4/2022 diagnosed with MM after pharyngeal biopsy  He has been going to [facility]  He has been treated with daratubmumab, Velcade, dexamethasone, IVIG with less frequent treatments now as he is remission  Right now it's Qmonthly treatment and it will go down to once every 2 months        He subsequently represented in December 2022 with hypoxic respiratory failure, rediagnosed with COVID and spent 2 days in the hospital with dexamethasone and remdesivir    Then again in February 2023 he represented with fever, cough and was initially started on vancomycin and cefepime with improvement and discharged  He was positive for rhinovirus and enterovirus  Procalcitonin was negative, cultures were negative  He recovered mostly from this Feb 2023 infection and then went to a birthday party 3 weeks ago and again he started developing shortness of breath, cough, fever and cold-like symptoms for  He completed an outpatient course of doxycycline  So far infectious work-up with strep and Legionella antigen, RP 2 panel, blood cultures were negative  He was given vancomycin and cefepime with improvement  MRSA cultures are pending  He feels a little better with IV vanc/cefepime but still with cough, mucus, and chest congestion  Tmax 99 2  WBC 17 94    Review of systems:  12 point review of systems was completed and was otherwise negative except as listed in HPI  Occupational History:  Service/ for a [employer]  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    Social History: Smoking hx smoked for 40 years - quit 14 years ago        Historical Information   Past Medical History:   Diagnosis Date   • Acute respiratory failure with hypoxia ([place] [id number] ) 04/20/2022   • Arthritis    • Cancer [facility])    • Cataract    • Colitis    • Colon polyp    • Diabetes mellitus ([place] [address] )    • Fatty liver    • GERD (gastroesophageal reflux disease)    • History of chemotherapy    • History of radiation therapy    • History of shingles 2018   • History of transfusion    • Hyperlipidemia    • Hypertension    • 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[place] [address] )      Past Surgical History:   Procedure Laterality Date   • AORTIC VALVE REPLACEMENT     • CARDIAC VALVE REPLACEMENT  2014    aorta   • CATARACT EXTRACTION Bilateral    • COLECTOMY     • COLONOSCOPY  11/2019    Prior right hemicolectomy   • CORONARY ARTERY BYPASS GRAFT     • DENTAL SURGERY     • JOINT REPLACEMENT  January 2019    left knee   • KNEE SURGERY     • LYMPH NODE BIOPSY  2003   • LYMPHADENECTOMY     • OTHER SURGICAL HISTORY "MAZE PROCEDURE   • MS ARTHRP KNE CONDYLE&PLATU MEDIAL&LAT COMPARTMENTS Left 2019    Procedure: ARTHROPLASTY LEFT KNEE TOTAL;  Surgeon: Campos Bazan MD;  Location:  MAIN OR;  Service: Orthopedics   • MS LARYNGOSCOPY DIRECT OPERATIVE W/BIOPSY Right 2022    Procedure: DIRECT LARYNGOSCOPY WITH BIOPSY RIGHT PHARYNX, POSSIBLE RIGHT NECK LYMPH NODE BIOPSY; FROZEN SECTION;  Surgeon: Jose C Alex MD;  Location:  MAIN OR;  Service: ENT   • SKIN BIOPSY     • UPPER GASTROINTESTINAL ENDOSCOPY  2019    Schatzki ring   • US GUIDED LYMPH NODE BIOPSY RIGHT  2021     Family History   Problem Relation Age of Onset   • Heart block Family    • Coronary artery disease Mother    • Thrombosis Mother    • Hypertension Mother    • Arthritis Mother    • Hyperlipidemia Mother    • Diabetes Father    • Hypertension Father    • Arthritis Father    • Sudden death Father         cardiac   • Diabetes type II Father    • Colon cancer Paternal Grandfather    • Cancer Paternal Grandfather    • Breast cancer Sister    • Heart disease Brother         Coronary stents     Social History     Socioeconomic History   • Marital status: /Civil Union     Spouse name: Not on file   • Number of children: Not on file   • Years of education: Not on file   • Highest education level: Not on file   Occupational History   • Occupation: WORKING FULLTIME    Tobacco Use   • Smoking status: Former     Packs/day: 1 00     Years: 40 00     Pack years: 40 00     Types: Cigarettes     Start date:      Quit date: 2009     Years since quittin 2   • Smokeless tobacco: Never   Vaping Use   • Vaping Use: Never used   Substance and Sexual Activity   • Alcohol use:  Yes     Alcohol/week: 2 0 standard drinks     Types: 2 Cans of beer per week     Comment: very rare \"beer here and there\"   • Drug use: No   • Sexual activity: Not Currently     Partners: Female     Birth control/protection: None   Other Topics Concern   • Not on file " Social History Narrative   • Not on file     Social Determinants of Health     Financial Resource Strain: Not on file   Food Insecurity: No Food Insecurity   • Worried About Running Out of Food in the Last Year: Never true   • Ran Out of Food in the Last Year: Never true   Transportation Needs: No Transportation Needs   • Lack of Transportation (Medical): No   • Lack of Transportation (Non-Medical):  No   Physical Activity: Not on file   Stress: Not on file   Social Connections: Not on file   Intimate Partner Violence: Not on file   Housing Stability: Low Risk    • Unable to Pay for Housing in the Last Year: No   • Number of Places Lived in the Last Year: 1   • Unstable Housing in the Last Year: No       Meds/Allergies   Current Facility-Administered Medications   Medication Dose Route Frequency   • acetaminophen (TYLENOL) tablet 650 mg  650 mg Oral Q6H PRN   • aluminum-magnesium hydroxide-simethicone (MYLANTA) oral suspension 30 mL  30 mL Oral Q4H PRN   • aspirin (ECOTRIN LOW STRENGTH) EC tablet 81 mg  81 mg Oral Daily   • atorvastatin (LIPITOR) tablet 40 mg  40 mg Oral Daily With Dinner   • benzonatate (TESSALON PERLES) capsule 100 mg  100 mg Oral TID PRN   • cefepime (MAXIPIME) IVPB (premix in dextrose) 2,000 mg 50 mL  2,000 mg Intravenous Q8H   • diphenhydrAMINE (BENADRYL) injection 25 mg  25 mg Intravenous Q6H PRN   • enoxaparin (LOVENOX) subcutaneous injection 40 mg  40 mg Subcutaneous Daily   • guaiFENesin (MUCINEX) 12 hr tablet 600 mg  600 mg Oral Q12H DONNIE   • insulin lispro (HumaLOG) 100 units/mL subcutaneous injection 1-5 Units  1-5 Units Subcutaneous TID AC   • insulin lispro (HumaLOG) 100 units/mL subcutaneous injection 1-5 Units  1-5 Units Subcutaneous HS   • metoprolol succinate (TOPROL-XL) 24 hr tablet 100 mg  100 mg Oral Daily   • pantoprazole (PROTONIX) EC tablet 40 mg  40 mg Oral Early Morning   • polyethylene glycol (MIRALAX) packet 17 g  17 g Oral Daily PRN   • potassium chloride (K-DUR,KLOR-CON) CR tablet 40 mEq  40 mEq Oral BID   • trimethobenzamide (TIGAN) IM injection 200 mg  200 mg Intramuscular Q6H PRN   • valACYclovir (VALTREX) tablet 500 mg  500 mg Oral Daily   • vancomycin (VANCOCIN) IVPB (premix in dextrose) 750 mg 150 mL  750 mg Intravenous Q12H     Medications Prior to Admission   Medication   • aspirin 81 MG tablet   • atorvastatin (LIPITOR) 40 mg tablet   • bortezomib (VELCADE)   • cholecalciferol (VITAMIN D3) 1,000 units tablet   • Daratumumab-Hyaluronidase-fihj (DARZALEX FASPRO SC)   • dexamethasone (DECADRON) 4 mg tablet   • Empagliflozin 25 MG TABS   • ferrous sulfate 324 (65 Fe) mg   • furosemide (LASIX) 20 mg tablet   • IMMUNE GLOBULIN, HUMAN, IV   • metFORMIN (GLUCOPHAGE-XR) 500 mg 24 hr tablet   • metoprolol succinate (TOPROL-XL) 100 mg 24 hr tablet   • multivitamin-minerals (CENTRUM ADULTS) tablet   • omeprazole (PriLOSEC) 40 MG capsule   • valACYclovir (VALTREX) 500 mg tablet   • dexamethasone, PF, (DECADRON) 10 mg/mL   • montelukast (SINGULAIR) 10 mg tablet   • nitroglycerin (NITROSTAT) 0 3 mg SL tablet     Allergies   Allergen Reactions   • Ceftin [Cefuroxime] Itching     However, has tolerated Cefazolin and Pip-Tazo since, which have different side chains  Be cautious with / avoid 2nd, 3rd, or 4th Gen Cephs that have similar side chains to Cefuroxime  • Lantus [Insulin Glargine] Itching       Vitals: Vitals personally reviewed  Vitals:    03/23/23 1500 03/23/23 2020 03/24/23 0300 03/24/23 0819   BP:  149/68  118/60   BP Location:       Pulse:  83  81   Resp:       Temp:  98 5 °F (36 9 °C)  98 6 °F (37 °C)   TempSrc:       SpO2: 93% 92%  92%   Weight:   73 9 kg (163 lb)       Body mass index is 22 73 kg/m²      Intake/Output Summary (Last 24 hours) at 3/24/2023 1239  Last data filed at 3/24/2023 0947  Gross per 24 hour   Intake 150 ml   Output 1000 ml   Net -850 ml     Invasive Devices     Peripheral Intravenous Line  Duration           Peripheral IV 03/23/23 Right Antecubital 1 day                Physical Exam  General: Awake alert and oriented x 3, conversant without conversational dyspnea, NAD, normal affect  HEENT:  PERRL, Sclera noninjected, nonicteric OU, Nares patent, no nasal flaring, no nasal drainage, Mucous membranes, moist, no oral lesions, normal dentition  NECK: Trachea midline, no accessory muscle use, no stridor  CARDIAC: Reg, single s1/S2, no m/r/g  PULM: Diffuse rhonchi and wheezing, coarse breath sounds in both lungs  CHEST: No gross deformities, equal chest expansion on inspiration bilaterally  ABD: Soft, nontender, nondistended, no rebound, no rigidity, no guarding  EXT: No cyanosis, no clubbing, no edema, normal capillary refill  SKIN:  No rashes, no lesions  NEURO: no focal neurologic deficits, AAOx3, moving all extremities appropriately    Labs: I have personally reviewed pertinent lab results    Laboratory and Diagnostics  Results from last 7 days   Lab Units 03/24/23  0433 03/23/23  0739   WBC Thousand/uL 15 01* 17 94*   HEMOGLOBIN g/dL 11 7* 12 2   HEMATOCRIT % 35 5* 37 4   PLATELETS Thousands/uL 276 308   NEUTROS PCT %  --  78*   MONOS PCT %  --  8     Results from last 7 days   Lab Units 03/24/23  0433 03/23/23  0739   SODIUM mmol/L 137 139   POTASSIUM mmol/L 3 5 3 0*   CHLORIDE mmol/L 103 99   CO2 mmol/L 26 28   ANION GAP mmol/L 8 12   BUN mg/dL 11 17   CREATININE mg/dL 0 81 0 91   CALCIUM mg/dL 8 5 9 0   GLUCOSE RANDOM mg/dL 116 186*   ALT U/L 7 9   AST U/L 12* 13   ALK PHOS U/L 70 88   ALBUMIN g/dL 3 4* 3 8   TOTAL BILIRUBIN mg/dL 0 55 0 50     Results from last 7 days   Lab Units 03/24/23  0433   MAGNESIUM mg/dL 1 7*      Results from last 7 days   Lab Units 03/23/23  0739   INR  0 98   PTT seconds 27          Results from last 7 days   Lab Units 03/23/23  0941 03/23/23  0739   LACTIC ACID mmol/L 1 4 2 3*                 Results from last 7 days   Lab Units 03/23/23  0739   D-DIMER QUANTITATIVE ug/ml FEU 1 38*     Results from last 7 days   Lab Units 03/24/23  0433 03/23/23  0739   PROCALCITONIN ng/ml 0 46* 0 19         3/23/2023 -full viral panel, not detected, Legionella antigen negative, strep pneumo antigen negative, blood cultures in progress, COVID/flu/RSV negative, MRSA culture in process      Imaging and other studies: I have personally reviewed pertinent films in PACS  3/23/2023 CTA chest -diffuse multifocal infiltrates, along bronchovascular pattern, patchy alveolar, central distribution  Stable small loculated left and trace right pleural effusion  Groundglass and interlobular thickening throughout both lungs  Right lung findings are less confluent compared to December 2022 but persistent  Persistent mediastinal lymphadenopathy    Comparison of CAT scans from December 2022 (left) and 3/23/2023 (right)              CT chest April 2022            Biopsies  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  1/20/2022 lymph node right neck level 5, benign lymph node  12/14/2021 lymph node right level 2 atypical cellular changes  5/15/2021 right middle lobe bronchial alveolar lavage negative for malignancy, 60% macrophage, 24% neutrophils    Pulmonary function testing: none    Echocardiogram:   5/2/2022  •  Left Ventricle: Left ventricular cavity size is normal  The left ventricular ejection fraction is 55%  Systolic function is low normal  Wall motion is normal  Diastolic function is moderately abnormal, consistent with grade II (pseudonormal) relaxation  Wall thickness is mildly increased  •  Right Ventricle: Systolic function is mildly reduced  •  Left Atrium: The atrium is mildly dilated  •  Right Atrium: The atrium is mildly dilated  •  Aortic Valve: There is a bioprosthetic valve  The prosthetic valve appears to be functioning normally  •  Mitral Valve: There is mild regurgitation  •  Tricuspid Valve: There is mild regurgitation  •  Pulmonic Valve: There is mild regurgitation        Code Status: Level 1 - Full "Catherine Hamilton MD  Pulmonary, Critical Care and Sleep Medicine  [facility]     Portions of the record may have been created with voice recognition software  Occasional wrong word or \"sound a like\" substitutions may have occurred due to the inherent limitations of voice recognition software  Please read the chart carefully and recognize, using context, where substitutions have occurred       "

## 2023-03-24 NOTE — ASSESSMENT & PLAN NOTE
· Blood pressure stable  · Continue metoprolol   · Lasix held on admission, consider resuming tomorrow    Monitor oral intake

## 2023-03-24 NOTE — ASSESSMENT & PLAN NOTE
· Initially started on IV cefepime/vancomycin  · MRSA swab negative -discontinue vancomycin  · Initial leukocytosis now resolved    Remains afebrile  · Blood cultures negative x2 after 24 hours  · Trend WBC and fever curve  · Plan for bronchoscopy on Monday, 3/27

## 2023-03-24 NOTE — ASSESSMENT & PLAN NOTE
· Presenting with worsening SOB with cold-like symptoms for the past 3 weeks    Completed a course of doxycycline  · Chest x-ray showing pneumonia  · CTA PE showing: No PE, severe multifocal right pneumonia, moderate interstitial edema, stable small loculated left and trace right pleural effusion  · Troponins negative  · Severe sepsis criteria with heart rate 97, respiratory rate 20 leukocytosis of 17 94, lactic acidosis in setting of pneumonia  · Urine antigens negative  · Blood cultures negative x2 after 24 hours  · MRSA swab negative -discontinue vancomycin  · Sputum cx pending  · Mildly elevated procalcitonin, downtrending  · Plan for bronchoscopy on Monday with pulmonology  · IGg 1,2,3,4, TANYA, ANCA, antimyeloperoxidase, antiproteinase, CCP, glomerular basement membrane antibody, RF Aspergillus antibodies and Aspergillus galactomannan antigen pending  · COVID negative  · Continue IV cefepime

## 2023-03-24 NOTE — ASSESSMENT & PLAN NOTE
· Follows with [facility] oncology  · On daratumumab, Velcade and dexamethasone-> hold d/t sepsis  · Patient reports he has been getting chemotherapy monthly, most recently end of February States he would be due for treatment 3/30

## 2023-03-24 NOTE — CASE MANAGEMENT
Case Management Assessment & Discharge Planning Note    Patient name Uriel Johnson  Location [address] [id number] MRN [id number]  : 1945 Date 3/24/2023       Current Admission Date: 3/23/2023  Current Admission Diagnosis:Severe sepsis [facility])   Patient Active Problem List    Diagnosis Date Noted   • Pneumonia of right lower lobe due to infectious organism 2023   • Acute respiratory insufficiency 2023   • Foraminal stenosis of cervical region    • Cervical radiculopathy    • Fever 2023   • Severe sepsis ([facility] ) 2023   • CVID (common variable immunodeficiency) ([facility] ) 2022   • Glossitis 2022   • Chest pain 2022   • Mouth ulcers 2021   • Iron deficiency anemia 2021   • Hypokalemia 04/15/2021   • Gastroesophageal reflux disease with esophagitis 2021   • Roberts's esophagus without dysplasia 2019   • Schatzki's ring of distal esophagus 2019   • Pharyngeal dysphagia 10/11/2019   • Personal history of colon cancer 10/11/2019   • Paroxysmal atrial fibrillation (HCC)    • Leukocytosis 2019   • Chronic diastolic congestive heart failure ([facility] ) 2019   • History of aortic valve replacement with bioprosthetic valve 2019   • S/P total knee arthroplasty, left 2019   • Essential hypertension 2018   • Hx of CABG    • Primary localized osteoarthritis of right knee 2018   • Multiple myeloma not having achieved remission ([facility] ) 2018   • Basal cell carcinoma of skin 2018   • Erectile dysfunction 2017   • Coronary artery disease involving native heart with angina pectoris, unspecified vessel or lesion type ([facility] ) 10/07/2014   • Malignant tumor of cecum ([facility] ) 2013   • Type 2 diabetes mellitus without complication, without long-term current use of insulin ([facility] ) 2012   • Fatty liver 2012   • Lymphoma ([facility] ) 2012   • Dyslipidemia 2012      LOS (days): 1  Geometric Mean LOS (GMLOS) (days): 5 00  Days to GMLOS:3 7     OBJECTIVE:    Risk of Unplanned Readmission Score: 28 04      Current admission status: Inpatient    Preferred Pharmacy:   [address], [address] [address]  Phone: [phone] Fax: [phone]    Primary Care Provider: Lay Galvez DO    Primary Insurance: [facility]  Secondary Insurance:     ASSESSMENT:  [address], [address] - Spouse   Primary Phone: [phone] (Mobile)  Home Phone: [phone]               Advance Directives  Does patient have a [address]?: Yes  Does patient have Advance Directives?: Yes  Advance Directives: Living will, Power of  for health care    Readmission Root Cause  30 Day Readmission: No    Patient Information  Admitted from[de-identified] Home  Mental Status: Alert  During Assessment patient was accompanied by: Not accompanied during assessment  Assessment information provided by[de-identified] Patient  Primary Caregiver: Self  Support Systems: Self, Spouse/significant other, Family members  [place] of Residence: [address] [place] [address] do you live in?: [address] [place] [address] entry access options   Select all that apply : Stairs  Number of steps to enter home : 1  Type of Current Residence: [address] [place] [address]  Upon entering residence, is there a bedroom on the main floor (no further steps)?: Yes  Upon entering residence, is there a bathroom on the main floor (no further steps)?: Yes  In the last 12 months, was there a time when you were not able to pay the mortgage or rent on time?: No  In the last 12 months, how many places have you lived?: 1  In the last 12 months, was there a time when you did not have a steady place to sleep or slept in a shelter (including now)?: No  Homeless/housing insecurity resource given?: N/A  Living Arrangements: Lives w/ Spouse/significant other  Is patient a ?: No    Activities of Daily Living Prior to Admission  Functional Status: Independent  Completes ADLs independently?: Yes  Ambulates independently?: Yes  Does patient use assisted devices?: No  Does patient currently own DME?: No  Does patient have a history of Outpatient Therapy (PT/OT)?: Yes  Does the patient have a history of Short-Term Rehab?: No  Does patient have a history of HHC?: Yes ([facility])    Patient Information Continued  Income Source: Employed  Does patient have prescription coverage?: Yes  Within the past 12 months, you worried that your food would run out before you got the money to buy more : Never true  Within the past 12 months, the food you bought just didn't last and you didn't have money to get more : Never true  Food insecurity resource given?: N/A  Does patient receive dialysis treatments?: No  Does patient have a history of substance abuse?: No  Does patient have a history of Mental Health Diagnosis?: No    Means of Transportation  Means of Transport to Appts[de-identified] Drives Self  In the past 12 months, has lack of transportation kept you from medical appointments or from getting medications?: No  In the past 12 months, has lack of transportation kept you from meetings, work, or from getting things needed for daily living?: No  Was application for public transport provided?: N/A    DISCHARGE DETAILS:    Discharge planning discussed with[de-identified] patient  Freedom of Choice: Yes  Comments - Freedom of Choice: discussed cm role  CM contacted family/caregiver?: Yes  Were Treatment Team discharge recommendations reviewed with patient/caregiver?: Yes  Did patient/caregiver verbalize understanding of patient care needs?: Yes  Were patient/caregiver advised of the risks associated with not following Treatment Team discharge recommendations?: Yes    Contacts  Patient Contacts: Misti Thompson  Relationship to Patient[de-identified] Family  Contact Method:  In Person  Reason/Outcome: Discharge [address]         Is the patient interested in [facility] at discharge?: No    DME Referral Provided  Referral made for DME?: No    Other Referral/Resources/Interventions Provided:  Interventions: None Indicated    Treatment Team Recommendation: Home  Discharge Destination Plan[de-identified] Home  IMM Given (Date):: 03/24/23  IMM Given to[de-identified] Patient  Family notified[de-identified] wife was present  Additional Comments: Pt lives with his spouse in [place] with [address]  THey have a first floor set up  He has no DME  NO hx of rehab, MH or D&A tx  He has had outpt therapy and [facility]  He has prescription coverage and [facility]  No anticipated needs

## 2023-03-24 NOTE — PROGRESS NOTES
Jean Carlos Mir is a 69 y o  male who is currently ordered Vancomycin IV with management by the Pharmacy Consult service  Relevant clinical data and objective / subjective history reviewed  Vancomycin Assessment:  Indication and Goal AUC/Trough: Pneumonia (goal -600, trough >10)  Clinical Status: stable  Micro:   Pending BC  Renal Function:  SCr: 0 81 mg/dL  CrCl: 81 1 mL/min  Renal replacement: Not on dialysis  Days of Therapy: 2  Current Dose: 750mg every 12 hours  Vancomycin Plan: random level 13 0 which is with in AUC goal  Thus, continue current dose  New Dosing: No change  Estimated AUC: 428 mcg*hr/mL  Estimated Trough: 14 mcg/mL  Next Level: 3/27/23 at 0600  Renal Function Monitoring: Daily BMP and [place] will continue to follow closely for s/sx of nephrotoxicity, infusion reactions and appropriateness of therapy  BMP and CBC will be ordered per protocol  We will continue to follow the patient’s culture results and clinical progress daily      Ruth Vera, Pharmacist, PharmD, BCPS

## 2023-03-24 NOTE — PLAN OF CARE
Problem: INFECTION - ADULT  Goal: Absence or prevention of progression during hospitalization  Description: INTERVENTIONS:  - Assess and monitor for signs and symptoms of infection  - Monitor lab/diagnostic results  - Monitor all insertion sites, i e  indwelling lines, tubes, and drains  - Monitor endotracheal if appropriate and nasal secretions for changes in amount and color  - [place] appropriate cooling/warming therapies per order  - Administer medications as ordered  - Instruct and encourage patient and family to use good hand hygiene technique  - Identify and instruct in appropriate isolation precautions for identified infection/condition  Outcome: Progressing     Problem: SAFETY ADULT  Goal: Patient will remain free of falls  Description: INTERVENTIONS:  - Educate patient/family on patient safety including physical limitations  - Instruct patient to call for assistance with activity   - Consult OT/PT to assist with strengthening/mobility   - Keep Call bell within reach  - Keep bed low and locked with side rails adjusted as appropriate  - Keep care items and personal belongings within reach  - Initiate and maintain comfort rounds  - Make Fall Risk Sign visible to staff  - Offer Toileting every  Hours, in advance of need  - Initiate/Maintain alarm  - Obtain necessary fall risk management equipment:  - Apply yellow socks and bracelet for high fall risk patients  - Consider moving patient to room near nurses station  Outcome: Progressing     Problem: Knowledge Deficit  Goal: Patient/family/caregiver demonstrates understanding of disease process, treatment plan, medications, and discharge instructions  Description: Complete learning assessment and assess knowledge base    Interventions:  - Provide teaching at level of understanding  - Provide teaching via preferred learning methods  Outcome: Progressing

## 2023-03-24 NOTE — ASSESSMENT & PLAN NOTE
Wt Readings from Last 3 Encounters:   03/24/23 73 9 kg (163 lb)   03/13/23 73 2 kg (161 lb 6 4 oz)   03/01/23 73 5 kg (162 lb)     · Patient appeared hypovolemic on presentation  · Home Lasix 20 mg twice daily held on admission  ·   · Echo done on 5/2/2022 showing EF of 31% systolic function low normal grade 2 diastolic dysfunction aortic valve biprosthetic valve  · Low-sodium diet  · Consider resuming Lasix tomorrow

## 2023-03-24 NOTE — PROGRESS NOTES
[facility]  Progress Note  Name: Ludmila Rosa  MRN: [id number]  Unit/Bed#: -01 I Date of Admission: 3/23/2023   Date of Service: 3/24/2023 I Hospital Day: 1    Assessment/Plan   * Severe sepsis [facility])  Assessment & Plan  PT presenting with worsening SOB with cold-like symptoms for the past 3 weeks    Completed a course of doxycycline    Chest x-ray showing pneumonia  CTA PE showing: No PE, severe multifocal right pneumonia, moderate interstitial edema, stable small loculated left and trace right pleural effusion  Troponins negative  Pro-Shaw negative    Patient meeting SIRS criteria with heart rate 97, respiratory rate 20 leukocytosis of 17 94  Source being pneumonia  Severe sepsis sepsis criteria met with a lactic acid of 2 3-> now cleared  Was given x2 500 mL/bolus-> given history of CHF will give gentle IVF, BP is ok and LA has cleared    Continue gentle IV fluids  Continue vanc cefepime day 1  RPP negative  Urine ag negative  bld cx NGTD @ 24h  MRSA cx pending  Sputum cx pending  procal 3/24 + continue to trend  BAL on Monday  Bronchodilators with hypertonic saline  IGg 1,2,3,4, TANYA, ANCA, antimyeloperoxidase, antiproteinase, CCP, glomerular basement membrane antibody, RF Aspergillus antibodies and Aspergillus galactomannan antigen pending  COVID negative  IS, ACP, RP, mucinex, tessalon   ID and pum following        Pneumonia of right lower lobe due to infectious organism  Assessment & Plan  See mgx of sepsis    Acute respiratory insufficiency  Assessment & Plan  initially requiring 4L NC-> weaned to RA  Wean as tolerated  See mgx of sepsis    Hypokalemia  Assessment & Plan  Replete as necessary with goal greater than 4    Check magnesium  Replete as necessary with goal greater than 2    Paroxysmal atrial fibrillation (HCC)  Assessment & Plan  RC with metoprolol  Not on any AC     Chronic diastolic congestive heart failure (HCC)  Assessment & Plan  Wt Readings from Last 3 Encounters:   03/24/23 73 9 kg (163 lb)   03/13/23 73 2 kg (161 lb 6 4 oz)   03/01/23 73 5 kg (162 lb)     Currently hypovolemic, will need IVF for severe sepsis  monitor resp status  Hold diuretics    Echo done on 5/2/2022 showing EF of 59% systolic function low normal grade 2 diastolic dysfunction aortic valve biprosthetic valve  I's and O's  Daily wts  DM low salt diet        Essential hypertension  Assessment & Plan  Hold lasix  Continue metoprolol     Multiple myeloma not having achieved remission ([facility] )  Assessment & Plan  Follows with [facility] oncology  On daratumumab, Velcade and dexamethasone-> hold d/t sepsis    Dyslipidemia  Assessment & Plan  Continue statin therapy    Type 2 diabetes mellitus without complication, without long-term current use of insulin [facility])  Assessment & Plan  Lab Results   Component Value Date    HGBA1C 8 9 (H) 02/01/2023       Recent Labs     03/23/23  1233 03/23/23  1558 03/24/23  0734   POCGLU 125 133 110       Blood Sugar Average: Last 72 hrs:     A1c elevated  ISS  Hold oral meds  Hypoglycemic protocol  DM low salt diet           VTE Pharmacologic Prophylaxis: VTE Score: 7 High Risk (Score >/= 5) - Pharmacological DVT Prophylaxis Ordered: enoxaparin (Lovenox)  Sequential Compression Devices Ordered  Patient Centered Rounds: I performed bedside rounds with nursing staff today  Discussions with Specialists or Other Care Team Provider: ID, Pulm, CM    Education and Discussions with Family / Patient: Patient declined call to   Total Time Spent on Date of Encounter in care of patient: 35 minutes This time was spent on one or more of the following: performing physical exam; counseling and coordination of care; obtaining or reviewing history; documenting in the medical record; reviewing/ordering tests, medications or procedures; communicating with other healthcare professionals and discussing with patient's family/caregivers      Current Length of Stay: 1 day(s)  Current Patient Status: Inpatient   Certification Statement: The patient will continue to require additional inpatient hospital stay due to PNA  Discharge Plan: Anticipate discharge in >72 hrs to home  Code Status: Level 1 - Full Code    Subjective:   Francie Pa was seen and examined at bedside  No acute events overnight  Discussed plan of care  All questions and concerns were answered and addressed  Patient states that previously he was able to have a productive cough overnight and into this morning has not been able to have a productive cough  Otherwise all other symptoms on review of systems is negative  Objective:     Vitals:   Temp (24hrs), Av 7 °F (37 1 °C), Min:98 5 °F (36 9 °C), Max:99 °F (37 2 °C)    Temp:  [98 5 °F (36 9 °C)-99 °F (37 2 °C)] 99 °F (37 2 °C)  HR:  [81-93] 93  BP: (118-149)/(60-68) 122/62  SpO2:  [90 %-93 %] 90 %  Body mass index is 22 73 kg/m²  Input and Output Summary (last 24 hours): Intake/Output Summary (Last 24 hours) at 3/24/2023 1446  Last data filed at 3/24/2023 0947  Gross per 24 hour   Intake 150 ml   Output 1000 ml   Net -850 ml       Physical Exam:   Physical Exam   Vitals and nursing note reviewed  Constitutional:       General: He is not in acute distress  Appearance: He is chronically ill-appearing  HENT:      Head: Normocephalic and atraumatic  Cardiovascular:      Rate and Rhythm: Normal rate and regular rhythm  Pulses: Normal pulses  Heart sounds: Normal heart sounds  Pulmonary:      Breath sounds: Rhonchi present  Comments: RA  Abdominal:      General: Abdomen is flat  Bowel sounds are normal       Palpations: Abdomen is soft  Musculoskeletal:      Right lower leg: No edema  Left lower leg: No edema  Skin:     General: Skin is warm  Neurological:      General: No focal deficit present  Mental Status: He is alert and oriented to person, place, and time      Additional Data:     Labs:  Results from last 7 days   Lab Units 03/24/23  0433 03/23/23  0739   WBC Thousand/uL 15 01* 17 94*   HEMOGLOBIN g/dL 11 7* 12 2   HEMATOCRIT % 35 5* 37 4   PLATELETS Thousands/uL 276 308   NEUTROS PCT %  --  78*   LYMPHS PCT %  --  8*   MONOS PCT %  --  8   EOS PCT %  --  4     Results from last 7 days   Lab Units 03/24/23  0433   SODIUM mmol/L 137   POTASSIUM mmol/L 3 5   CHLORIDE mmol/L 103   CO2 mmol/L 26   BUN mg/dL 11   CREATININE mg/dL 0 81   ANION GAP mmol/L 8   CALCIUM mg/dL 8 5   ALBUMIN g/dL 3 4*   TOTAL BILIRUBIN mg/dL 0 55   ALK PHOS U/L 70   ALT U/L 7   AST U/L 12*   GLUCOSE RANDOM mg/dL 116     Results from last 7 days   Lab Units 03/23/23  0739   INR  0 98     Results from last 7 days   Lab Units 03/24/23  1121 03/24/23  0734 03/23/23  1558 03/23/23  1233   POC GLUCOSE mg/dl 158* 110 133 125         Results from last 7 days   Lab Units 03/24/23  0433 03/23/23  0941 03/23/23  0739   LACTIC ACID mmol/L  --  1 4 2 3*   PROCALCITONIN ng/ml 0 46*  --  0 19       Lines/Drains:  Invasive Devices     Peripheral Intravenous Line  Duration           Peripheral IV 03/23/23 Right Antecubital 1 day                  Telemetry:  Telemetry Orders (From admission, onward)             48 Hour Telemetry Monitoring  (ED Bridging Orders Panel)  Continuous x 48 hours        References:    Telemetry Guidelines   Question:  Reason for 48 Hour Telemetry  Answer:  Acute MI, chest pain - R/O MI, or unstable angina                              Imaging: No pertinent imaging reviewed  Recent Cultures (last 7 days):   Results from last 7 days   Lab Units 03/23/23  1056 03/23/23  0739   BLOOD CULTURE   --  No Growth at 24 hrs  No Growth at 24 hrs     LEGIONELLA URINARY ANTIGEN  Negative  --        Last 24 Hours Medication List:   Current Facility-Administered Medications   Medication Dose Route Frequency Provider Last Rate   • acetaminophen  650 mg Oral Q6H PRN Sarahi Rush MD     • aluminum-magnesium hydroxide-simethicone  30 mL Oral Q4H PRN Kam Best MD     • aspirin  81 mg Oral Daily Kam Best MD     • atorvastatin  40 mg Oral Daily With Danial Canela MD     • benzonatate  100 mg Oral TID PRN Kam Best MD     • cefepime  2,000 mg Intravenous Q8H Kam Best MD 2,000 mg (03/24/23 5472)   • diphenhydrAMINE  25 mg Intravenous Q6H PRN Kam Best MD     • enoxaparin  40 mg Subcutaneous Daily Kam Best MD     • guaiFENesin  600 mg Oral Q12H Marcelo Tian MD     • insulin lispro  1-5 Units Subcutaneous TID AC Kam Best MD     • insulin lispro  1-5 Units Subcutaneous HS Kam Best MD     • levalbuterol  1 25 mg Nebulization TID Danny Lopes MD     • metoprolol succinate  100 mg Oral Daily Kam Best MD     • pantoprazole  40 mg Oral Early Morning Kam Best MD     • polyethylene glycol  17 g Oral Daily PRN Kam Best MD     • potassium chloride  40 mEq Oral BID Kam Best MD     • sodium chloride  4 mL Nebulization TID Danny Lopes MD     • trimethobenzamide  200 mg Intramuscular Q6H PRN Kam Best MD     • valACYclovir  500 mg Oral Daily Kam Best MD     • vancomycin  750 mg Intravenous Q12H Kam Best MD Stopped (03/24/23 1313)        Today, Patient Was Seen By: Kam Best MD    **Please Note: This note may have been constructed using a voice recognition system  **

## 2023-03-24 NOTE — ASSESSMENT & PLAN NOTE
Lab Results   Component Value Date    HGBA1C 8 9 (H) 02/01/2023       Recent Labs     03/23/23  1233 03/23/23  1558 03/24/23  0734 03/24/23  1121   POCGLU 125 133 110 158*       Blood Sugar Average: Last 72 hrs:     · Hemoglobin A1c shows poor control at 8 9  · However during hospitalization sugars have been well controlled on SSI plus Accu-Chek  · Diabetic diet

## 2023-03-25 LAB
ANA SER QL IA: NEGATIVE
ANION GAP SERPL CALCULATED.3IONS-SCNC: 7 MMOL/L (ref 4–13)
BASOPHILS # BLD AUTO: 0.09 THOUSANDS/ÂΜL (ref 0–0.1)
BASOPHILS NFR BLD AUTO: 1 % (ref 0–1)
BUN SERPL-MCNC: 10 MG/DL (ref 5–25)
CALCIUM SERPL-MCNC: 8.4 MG/DL (ref 8.4–10.2)
CHLORIDE SERPL-SCNC: 104 MMOL/L (ref 96–108)
CO2 SERPL-SCNC: 26 MMOL/L (ref 21–32)
CREAT SERPL-MCNC: 0.69 MG/DL (ref 0.6–1.3)
EOSINOPHIL # BLD AUTO: 0.87 THOUSAND/ÂΜL (ref 0–0.61)
EOSINOPHIL NFR BLD AUTO: 9 % (ref 0–6)
ERYTHROCYTE [DISTWIDTH] IN BLOOD BY AUTOMATED COUNT: 15.3 % (ref 11.6–15.1)
GFR SERPL CREATININE-BSD FRML MDRD: 92 ML/MIN/1.73SQ M
GLUCOSE SERPL-MCNC: 131 MG/DL (ref 65–140)
GLUCOSE SERPL-MCNC: 132 MG/DL (ref 65–140)
GLUCOSE SERPL-MCNC: 191 MG/DL (ref 65–140)
GLUCOSE SERPL-MCNC: 204 MG/DL (ref 65–140)
GLUCOSE SERPL-MCNC: 237 MG/DL (ref 65–140)
HCT VFR BLD AUTO: 31 % (ref 36.5–49.3)
HGB BLD-MCNC: 10.1 G/DL (ref 12–17)
IMM GRANULOCYTES # BLD AUTO: 0.06 THOUSAND/UL (ref 0–0.2)
IMM GRANULOCYTES NFR BLD AUTO: 1 % (ref 0–2)
LYMPHOCYTES # BLD AUTO: 1.43 THOUSANDS/ÂΜL (ref 0.6–4.47)
LYMPHOCYTES NFR BLD AUTO: 15 % (ref 14–44)
MAGNESIUM SERPL-MCNC: 2.2 MG/DL (ref 1.9–2.7)
MCH RBC QN AUTO: 28.8 PG (ref 26.8–34.3)
MCHC RBC AUTO-ENTMCNC: 32.6 G/DL (ref 31.4–37.4)
MCV RBC AUTO: 88 FL (ref 82–98)
MONOCYTES # BLD AUTO: 0.93 THOUSAND/ÂΜL (ref 0.17–1.22)
MONOCYTES NFR BLD AUTO: 9 % (ref 4–12)
NEUTROPHILS # BLD AUTO: 6.49 THOUSANDS/ÂΜL (ref 1.85–7.62)
NEUTS SEG NFR BLD AUTO: 65 % (ref 43–75)
NRBC BLD AUTO-RTO: 0 /100 WBCS
PLATELET # BLD AUTO: 270 THOUSANDS/UL (ref 149–390)
PMV BLD AUTO: 10 FL (ref 8.9–12.7)
POTASSIUM SERPL-SCNC: 3.7 MMOL/L (ref 3.5–5.3)
PROCALCITONIN SERPL-MCNC: 0.27 NG/ML
RBC # BLD AUTO: 3.51 MILLION/UL (ref 3.88–5.62)
SODIUM SERPL-SCNC: 137 MMOL/L (ref 135–147)
WBC # BLD AUTO: 9.87 THOUSAND/UL (ref 4.31–10.16)

## 2023-03-25 RX ADMIN — LEVALBUTEROL HYDROCHLORIDE 1.25 MG: 1.25 SOLUTION, CONCENTRATE RESPIRATORY (INHALATION) at 07:18

## 2023-03-25 RX ADMIN — INSULIN LISPRO 1 UNITS: 100 INJECTION, SOLUTION INTRAVENOUS; SUBCUTANEOUS at 17:19

## 2023-03-25 RX ADMIN — GUAIFENESIN 600 MG: 600 TABLET ORAL at 21:48

## 2023-03-25 RX ADMIN — INSULIN LISPRO 2 UNITS: 100 INJECTION, SOLUTION INTRAVENOUS; SUBCUTANEOUS at 21:48

## 2023-03-25 RX ADMIN — ENOXAPARIN SODIUM 40 MG: 100 INJECTION SUBCUTANEOUS at 08:39

## 2023-03-25 RX ADMIN — CEFEPIME HYDROCHLORIDE 2000 MG: 2 INJECTION, SOLUTION INTRAVENOUS at 23:28

## 2023-03-25 RX ADMIN — METOPROLOL SUCCINATE 100 MG: 50 TABLET, EXTENDED RELEASE ORAL at 08:40

## 2023-03-25 RX ADMIN — ASPIRIN 81 MG: 81 TABLET, COATED ORAL at 08:40

## 2023-03-25 RX ADMIN — SODIUM CHLORIDE SOLN NEBU 3% 4 ML: 3 NEBU SOLN at 07:18

## 2023-03-25 RX ADMIN — VALACYCLOVIR HYDROCHLORIDE 500 MG: 500 TABLET, FILM COATED ORAL at 08:39

## 2023-03-25 RX ADMIN — GUAIFENESIN 600 MG: 600 TABLET ORAL at 08:39

## 2023-03-25 RX ADMIN — CEFEPIME HYDROCHLORIDE 2000 MG: 2 INJECTION, SOLUTION INTRAVENOUS at 08:39

## 2023-03-25 RX ADMIN — LEVALBUTEROL HYDROCHLORIDE 1.25 MG: 1.25 SOLUTION, CONCENTRATE RESPIRATORY (INHALATION) at 19:23

## 2023-03-25 RX ADMIN — ATORVASTATIN CALCIUM 40 MG: 40 TABLET, FILM COATED ORAL at 16:34

## 2023-03-25 RX ADMIN — CEFEPIME HYDROCHLORIDE 2000 MG: 2 INJECTION, SOLUTION INTRAVENOUS at 16:35

## 2023-03-25 RX ADMIN — SODIUM CHLORIDE SOLN NEBU 3% 4 ML: 3 NEBU SOLN at 19:23

## 2023-03-25 RX ADMIN — INSULIN LISPRO 1 UNITS: 100 INJECTION, SOLUTION INTRAVENOUS; SUBCUTANEOUS at 12:18

## 2023-03-25 RX ADMIN — PANTOPRAZOLE SODIUM 40 MG: 40 TABLET, DELAYED RELEASE ORAL at 06:02

## 2023-03-25 RX ADMIN — LEVALBUTEROL HYDROCHLORIDE 1.25 MG: 1.25 SOLUTION, CONCENTRATE RESPIRATORY (INHALATION) at 13:21

## 2023-03-25 RX ADMIN — SODIUM CHLORIDE SOLN NEBU 3% 4 ML: 3 NEBU SOLN at 13:21

## 2023-03-25 NOTE — PLAN OF CARE
Problem: Potential for Falls  Goal: Patient will remain free of falls  Description: INTERVENTIONS:  - Educate patient/family on patient safety including physical limitations  - Instruct patient to call for assistance with activity   - Consult OT/PT to assist with strengthening/mobility   - Keep Call bell within reach  - Keep bed low and locked with side rails adjusted as appropriate  - Keep care items and personal belongings within reach  - Initiate and maintain comfort rounds  - Make Fall Risk Sign visible to staff  - Offer Toileting every  Hours, in advance of need  - Initiate/Maintain alarm  - Obtain necessary fall risk management equipment:  - Apply yellow socks and bracelet for high fall risk patients  - Consider moving patient to room near nurses station  3/25/2023 0301 by Angelina Amezcua RN  Outcome: Progressing  3/25/2023 0301 by Angelina Amezcua RN  Outcome: Progressing     Problem: PAIN - ADULT  Goal: Verbalizes/displays adequate comfort level or baseline comfort level  Description: Interventions:  - Encourage patient to monitor pain and request assistance  - Assess pain using appropriate pain scale  - Administer analgesics based on type and severity of pain and evaluate response  - Implement non-pharmacological measures as appropriate and evaluate response  - Consider cultural and social influences on pain and pain management  - Notify physician/advanced practitioner if interventions unsuccessful or patient reports new pain  3/25/2023 0301 by Angelina Amezcua RN  Outcome: Progressing  3/25/2023 0301 by Angelina Amezcua RN  Outcome: Progressing     Problem: INFECTION - ADULT  Goal: Absence or prevention of progression during hospitalization  Description: INTERVENTIONS:  - Assess and monitor for signs and symptoms of infection  - Monitor lab/diagnostic results  - Monitor all insertion sites, i e  indwelling lines, tubes, and drains  - Monitor endotracheal if appropriate and nasal secretions for changes in amount and color  - [place] appropriate cooling/warming therapies per order  - Administer medications as ordered  - Instruct and encourage patient and family to use good hand hygiene technique  - Identify and instruct in appropriate isolation precautions for identified infection/condition  3/25/2023 0301 by Connie Gamble RN  Outcome: Progressing  3/25/2023 0301 by Connie Gamble RN  Outcome: Progressing  Goal: Absence of fever/infection during neutropenic period  Description: INTERVENTIONS:  - Monitor WBC    3/25/2023 0301 by Connie Gamble RN  Outcome: Progressing  3/25/2023 0301 by Connie Gamble RN  Outcome: Progressing     Problem: SAFETY ADULT  Goal: Patient will remain free of falls  Description: INTERVENTIONS:  - Educate patient/family on patient safety including physical limitations  - Instruct patient to call for assistance with activity   - Consult OT/PT to assist with strengthening/mobility   - Keep Call bell within reach  - Keep bed low and locked with side rails adjusted as appropriate  - Keep care items and personal belongings within reach  - Initiate and maintain comfort rounds  - Make Fall Risk Sign visible to staff  - Offer Toileting every  Hours, in advance of need  - Initiate/Maintain alarm  - Obtain necessary fall risk management equipment:  - Apply yellow socks and bracelet for high fall risk patients  - Consider moving patient to room near nurses station  3/25/2023 0301 by Connie Gamble RN  Outcome: Progressing  3/25/2023 0301 by Connie Gamble RN  Outcome: Progressing  Goal: Maintain or return to baseline ADL function  Description: INTERVENTIONS:  -  Assess patient's ability to carry out ADLs; assess patient's baseline for ADL function and identify physical deficits which impact ability to perform ADLs (bathing, care of mouth/teeth, toileting, grooming, dressing, etc )  - Assess/evaluate cause of self-care deficits   - Assess range of motion  - Assess patient's mobility; develop plan if impaired  - Assess patient's need for assistive devices and provide as appropriate  - Encourage maximum independence but intervene and supervise when necessary  - Involve family in performance of ADLs  - Assess for home care needs following discharge   - Consider OT consult to assist with ADL evaluation and planning for discharge  - Provide patient education as appropriate  3/25/2023 0301 by Brittney De La Vega RN  Outcome: Progressing  3/25/2023 0301 by Brittney De La Vega RN  Outcome: Progressing  Goal: Maintains/Returns to pre admission functional level  Description: INTERVENTIONS:  - Perform BMAT or MOVE assessment daily    - Set and communicate daily mobility goal to care team and patient/family/caregiver  - Collaborate with rehabilitation services on mobility goals if consulted  - Perform Range of Motion  times a day  - Reposition patient every  hours    - Dangle patient  times a day  - Stand patient  times a day  - Ambulate patient  times a day  - Out of bed to chair  times a day   - Out of bed for meals times a day  - Out of bed for toileting  - Record patient progress and toleration of activity level   3/25/2023 0301 by Brittney De La Vega RN  Outcome: Progressing  3/25/2023 0301 by Brittney De La Vega RN  Outcome: Progressing     Problem: DISCHARGE PLANNING  Goal: Discharge to home or other facility with appropriate resources  Description: INTERVENTIONS:  - Identify barriers to discharge w/patient and caregiver  - Arrange for needed discharge resources and transportation as appropriate  - Identify discharge learning needs (meds, wound care, etc )  - Arrange for interpretive services to assist at discharge as needed  - Refer to Case Management Department for coordinating discharge planning if the patient needs post-hospital services based on physician/advanced practitioner order or complex needs related to functional status, cognitive ability, or social support system  3/25/2023 0301 by Allen Angeles Girma Rolon RN  Outcome: Progressing  3/25/2023 0301 by Connie Gamble RN  Outcome: Progressing     Problem: Knowledge Deficit  Goal: Patient/family/caregiver demonstrates understanding of disease process, treatment plan, medications, and discharge instructions  Description: Complete learning assessment and assess knowledge base    Interventions:  - Provide teaching at level of understanding  - Provide teaching via preferred learning methods  3/25/2023 0301 by Connie Gamble RN  Outcome: Progressing  3/25/2023 0301 by Connie Gamble RN  Outcome: Progressing

## 2023-03-25 NOTE — PLAN OF CARE
Problem: Potential for Falls  Goal: Patient will remain free of falls  Description: INTERVENTIONS:  - Educate patient/family on patient safety including physical limitations  - Instruct patient to call for assistance with activity   - Consult OT/PT to assist with strengthening/mobility   - Keep Call bell within reach  - Keep bed low and locked with side rails adjusted as appropriate  - Keep care items and personal belongings within reach  - Initiate and maintain comfort rounds  - Make Fall Risk Sign visible to staff  - Offer Toileting every  Hours, in advance of need  - Initiate/Maintain alarm  - Obtain necessary fall risk management equipment:  - Apply yellow socks and bracelet for high fall risk patients  - Consider moving patient to room near nurses station  Outcome: Progressing     Problem: PAIN - ADULT  Goal: Verbalizes/displays adequate comfort level or baseline comfort level  Description: Interventions:  - Encourage patient to monitor pain and request assistance  - Assess pain using appropriate pain scale  - Administer analgesics based on type and severity of pain and evaluate response  - Implement non-pharmacological measures as appropriate and evaluate response  - Consider cultural and social influences on pain and pain management  - Notify physician/advanced practitioner if interventions unsuccessful or patient reports new pain  Outcome: Progressing     Problem: INFECTION - ADULT  Goal: Absence or prevention of progression during hospitalization  Description: INTERVENTIONS:  - Assess and monitor for signs and symptoms of infection  - Monitor lab/diagnostic results  - Monitor all insertion sites, i e  indwelling lines, tubes, and drains  - Monitor endotracheal if appropriate and nasal secretions for changes in amount and color  - [place] appropriate cooling/warming therapies per order  - Administer medications as ordered  - Instruct and encourage patient and family to use good hand hygiene technique  - Identify and instruct in appropriate isolation precautions for identified infection/condition  Outcome: Progressing  Goal: Absence of fever/infection during neutropenic period  Description: INTERVENTIONS:  - Monitor WBC    Outcome: Progressing     Problem: SAFETY ADULT  Goal: Patient will remain free of falls  Description: INTERVENTIONS:  - Educate patient/family on patient safety including physical limitations  - Instruct patient to call for assistance with activity   - Consult OT/PT to assist with strengthening/mobility   - Keep Call bell within reach  - Keep bed low and locked with side rails adjusted as appropriate  - Keep care items and personal belongings within reach  - Initiate and maintain comfort rounds  - Make Fall Risk Sign visible to staff  - Offer Toileting every  Hours, in advance of need  - Initiate/Maintain alarm  - Obtain necessary fall risk management equipment:  - Apply yellow socks and bracelet for high fall risk patients  - Consider moving patient to room near nurses station  Outcome: Progressing  Goal: Maintain or return to baseline ADL function  Description: INTERVENTIONS:  -  Assess patient's ability to carry out ADLs; assess patient's baseline for ADL function and identify physical deficits which impact ability to perform ADLs (bathing, care of mouth/teeth, toileting, grooming, dressing, etc )  - Assess/evaluate cause of self-care deficits   - Assess range of motion  - Assess patient's mobility; develop plan if impaired  - Assess patient's need for assistive devices and provide as appropriate  - Encourage maximum independence but intervene and supervise when necessary  - Involve family in performance of ADLs  - Assess for home care needs following discharge   - Consider OT consult to assist with ADL evaluation and planning for discharge  - Provide patient education as appropriate  Outcome: Progressing  Goal: Maintains/Returns to pre admission functional level  Description: INTERVENTIONS:  - Perform BMAT or MOVE assessment daily    - Set and communicate daily mobility goal to care team and patient/family/caregiver  - Collaborate with rehabilitation services on mobility goals if consulted  - Perform Range of Motion  times a day  - Reposition patient every  hours  - Dangle patient  times a day  - Stand patient  times a day  - Ambulate patient  times a day  - Out of bed to chair  times a day   - Out of bed for meals times a day  - Out of bed for toileting  - Record patient progress and toleration of activity level   Outcome: Progressing     Problem: DISCHARGE PLANNING  Goal: Discharge to home or other facility with appropriate resources  Description: INTERVENTIONS:  - Identify barriers to discharge w/patient and caregiver  - Arrange for needed discharge resources and transportation as appropriate  - Identify discharge learning needs (meds, wound care, etc )  - Arrange for interpretive services to assist at discharge as needed  - Refer to Case Management Department for coordinating discharge planning if the patient needs post-hospital services based on physician/advanced practitioner order or complex needs related to functional status, cognitive ability, or social support system  Outcome: Progressing     Problem: Knowledge Deficit  Goal: Patient/family/caregiver demonstrates understanding of disease process, treatment plan, medications, and discharge instructions  Description: Complete learning assessment and assess knowledge base    Interventions:  - Provide teaching at level of understanding  - Provide teaching via preferred learning methods  Outcome: Progressing

## 2023-03-25 NOTE — PROGRESS NOTES
[place]  Progress Note  Name: Mak Olson  MRN: [id number]  Unit/Bed#: -01 I Date of Admission: 3/23/2023   Date of Service: 3/25/2023 I Hospital Day: 2    Assessment/Plan   * Severe sepsis [facility])  Assessment & Plan  · Presenting with worsening SOB with cold-like symptoms for the past 3 weeks  Completed a course of doxycycline  · Chest x-ray showing pneumonia  · CTA PE showing: No PE, severe multifocal right pneumonia, moderate interstitial edema, stable small loculated left and trace right pleural effusion  · Troponins negative  · Severe sepsis criteria with heart rate 97, respiratory rate 20 leukocytosis of 17 94, lactic acidosis in setting of pneumonia  · Urine antigens negative  · Blood cultures negative x2 after 24 hours  · MRSA swab negative -discontinue vancomycin  · Sputum cx pending  · Mildly elevated procalcitonin, downtrending  · Plan for bronchoscopy on Monday with pulmonology  · IGg 1,2,3,4, TANYA, ANCA, antimyeloperoxidase, antiproteinase, CCP, glomerular basement membrane antibody, RF Aspergillus antibodies and Aspergillus galactomannan antigen pending  · COVID negative  · Continue IV cefepime    Acute respiratory insufficiency  Assessment & Plan  · Initially requiring 4L NC-> has since been weaned to room air  · Resolved  · See mgx of sepsis    Pneumonia of right lower lobe due to infectious organism  Assessment & Plan  · Initially started on IV cefepime/vancomycin  · MRSA swab negative -discontinue vancomycin  · Initial leukocytosis now resolved    Remains afebrile  · Blood cultures negative x2 after 24 hours  · Trend WBC and fever curve  · Plan for bronchoscopy on Monday, 3/27    Hypokalemia  Assessment & Plan  · Potassium 3 0 on admission  · Received repletion  · Resolved  · Trend BMP    Paroxysmal atrial fibrillation (HCC)  Assessment & Plan  · Rate controlled on metoprolol  · Not on anticoagulation as outpatient    Chronic diastolic congestive heart failure [facility])  Assessment & Plan  Wt Readings from Last 3 Encounters:   03/24/23 73 9 kg (163 lb)   03/13/23 73 2 kg (161 lb 6 4 oz)   03/01/23 73 5 kg (162 lb)     · Patient appeared hypovolemic on presentation  · Home Lasix 20 mg twice daily held on admission  ·   · Echo done on 5/2/2022 showing EF of 82% systolic function low normal grade 2 diastolic dysfunction aortic valve biprosthetic valve  · Low-sodium diet  · Consider resuming Lasix tomorrow    Essential hypertension  Assessment & Plan  · Blood pressure stable  · Continue metoprolol   · Lasix held on admission, consider resuming tomorrow  Monitor oral intake    Multiple myeloma not having achieved remission [facility])  Assessment & Plan  · Follows with [facility] oncology  · On daratumumab, Velcade and dexamethasone-> hold d/t sepsis  · Patient reports he has been getting chemotherapy monthly, most recently end of February  States he would be due for treatment 3/30    Dyslipidemia  Assessment & Plan  · Continue statin therapy    Type 2 diabetes mellitus without complication, without long-term current use of insulin [facility])  Assessment & Plan  Lab Results   Component Value Date    HGBA1C 8 9 (H) 02/01/2023       Recent Labs     03/23/23  1233 03/23/23  1558 03/24/23  0734 03/24/23  1121   POCGLU 125 133 110 158*       Blood Sugar Average: Last 72 hrs:     · Hemoglobin A1c shows poor control at 8 9  · However during hospitalization sugars have been well controlled on SSI plus Accu-Chek  · Diabetic diet           VTE Pharmacologic Prophylaxis: VTE Score: 7 High Risk (Score >/= 5) - Pharmacological DVT Prophylaxis Ordered: enoxaparin (Lovenox)  Sequential Compression Devices Ordered  Patient Centered Rounds: I performed bedside rounds with nursing staff today  Discussions with Specialists or Other Care Team Provider: CM, pulm    Education and Discussions with Family / Patient: Updated  (wife) via phone      Total Time Spent on Date of Encounter in care of patient: 45 minutes This time was spent on one or more of the following: performing physical exam; counseling and coordination of care; obtaining or reviewing history; documenting in the medical record; reviewing/ordering tests, medications or procedures; communicating with other healthcare professionals and discussing with patient's family/caregivers  Current Length of Stay: 2 day(s)  Current Patient Status: Inpatient   Certification Statement: The patient will continue to require additional inpatient hospital stay due to IV antibiotics, bronchoscopy on Monday  Discharge Plan: Anticipate discharge in >72 hrs to home  Code Status: Level 1 - Full Code    Subjective:   Patient states he feels slightly better since presentation  Still with occasional productive cough  Denies chest pain/palpitations, nausea/vomiting, abdominal pain  Shortness of breath improving  Objective:     Vitals:   Temp (24hrs), Av 5 °F (36 9 °C), Min:98 °F (36 7 °C), Max:99 °F (37 2 °C)    Temp:  [98 °F (36 7 °C)-99 °F (37 2 °C)] 98 °F (36 7 °C)  HR:  [76-93] 83  Resp:  [16-17] 17  BP: (122-138)/(61-72) 138/72  SpO2:  [90 %-99 %] 94 %  Body mass index is 22 66 kg/m²  Input and Output Summary (last 24 hours): Intake/Output Summary (Last 24 hours) at 3/25/2023 1236  Last data filed at 3/25/2023 0603  Gross per 24 hour   Intake 220 ml   Output 1175 ml   Net -955 ml       Physical Exam:   Physical Exam  Vitals and nursing note reviewed  Constitutional:       General: He is not in acute distress  Appearance: He is well-developed  Comments: No acute distress   HENT:      Head: Normocephalic and atraumatic  Eyes:      General: No scleral icterus  Extraocular Movements: Extraocular movements intact  Conjunctiva/sclera: Conjunctivae normal    Cardiovascular:      Rate and Rhythm: Normal rate and regular rhythm  Heart sounds: No murmur heard    Pulmonary:      Effort: Pulmonary effort is normal  No respiratory distress  Breath sounds: Rhonchi present  Abdominal:      General: Bowel sounds are normal       Palpations: Abdomen is soft  Tenderness: There is no abdominal tenderness  There is no guarding or rebound  Musculoskeletal:         General: No swelling  Cervical back: Normal range of motion  Comments: Able to move upper/lower ext bilaterally, no edema   Skin:     General: Skin is warm and dry  Capillary Refill: Capillary refill takes less than 2 seconds  Neurological:      Mental Status: He is alert and oriented to person, place, and time     Psychiatric:         Mood and Affect: Mood normal          Speech: Speech normal          Behavior: Behavior normal           Additional Data:     Labs:  Results from last 7 days   Lab Units 03/25/23  0501   WBC Thousand/uL 9 87   HEMOGLOBIN g/dL 10 1*   HEMATOCRIT % 31 0*   PLATELETS Thousands/uL 270   NEUTROS PCT % 65   LYMPHS PCT % 15   MONOS PCT % 9   EOS PCT % 9*     Results from last 7 days   Lab Units 03/25/23  0501 03/24/23  0433   SODIUM mmol/L 137 137   POTASSIUM mmol/L 3 7 3 5   CHLORIDE mmol/L 104 103   CO2 mmol/L 26 26   BUN mg/dL 10 11   CREATININE mg/dL 0 69 0 81   ANION GAP mmol/L 7 8   CALCIUM mg/dL 8 4 8 5   ALBUMIN g/dL  --  3 4*   TOTAL BILIRUBIN mg/dL  --  0 55   ALK PHOS U/L  --  70   ALT U/L  --  7   AST U/L  --  12*   GLUCOSE RANDOM mg/dL 131 116     Results from last 7 days   Lab Units 03/23/23  0739   INR  0 98     Results from last 7 days   Lab Units 03/25/23  1140 03/25/23  0710 03/24/23  2133 03/24/23  1711 03/24/23  1121 03/24/23  0734 03/23/23  1558 03/23/23  1233   POC GLUCOSE mg/dl 204* 132 233* 157* 158* 110 133 125         Results from last 7 days   Lab Units 03/25/23  0501 03/24/23  0433 03/23/23  0941 03/23/23  0739   LACTIC ACID mmol/L  --   --  1 4 2 3*   PROCALCITONIN ng/ml 0 27* 0 46*  --  0 19       Lines/Drains:  Invasive Devices     Peripheral Intravenous Line  Duration Peripheral IV 03/23/23 Right Antecubital 2 days                      Imaging: Reviewed radiology reports from this admission including: chest CT scan    Recent Cultures (last 7 days):   Results from last 7 days   Lab Units 03/23/23  1056 03/23/23  0739   BLOOD CULTURE   --  No Growth at 24 hrs  No Growth at 24 hrs  LEGIONELLA URINARY ANTIGEN  Negative  --        Last 24 Hours Medication List:   Current Facility-Administered Medications   Medication Dose Route Frequency Provider Last Rate   • acetaminophen  650 mg Oral Q6H PRN Thee Gross MD     • aluminum-magnesium hydroxide-simethicone  30 mL Oral Q4H PRN Thee Gross MD     • aspirin  81 mg Oral Daily Thee Gross MD     • atorvastatin  40 mg Oral Daily With Hector Davis MD     • benzonatate  100 mg Oral TID PRN Thee Gross MD     • cefepime  2,000 mg Intravenous Q8H Thee Gross MD 2,000 mg (03/25/23 2191)   • diphenhydrAMINE  25 mg Intravenous Q6H PRN Thee Gross MD     • enoxaparin  40 mg Subcutaneous Daily Thee Gross MD     • guaiFENesin  600 mg Oral Q12H Eddie Farfan MD     • insulin lispro  1-5 Units Subcutaneous TID AC Thee Gross MD     • insulin lispro  1-5 Units Subcutaneous HS Thee Gross MD     • levalbuterol  1 25 mg Nebulization TID Joanna Parmar MD     • metoprolol succinate  100 mg Oral Daily Thee Gross MD     • pantoprazole  40 mg Oral Early Morning Thee Gross MD     • polyethylene glycol  17 g Oral Daily PRN Thee Gross MD     • sodium chloride  4 mL Nebulization TID Joanna Parmar MD     • trimethobenzamide  200 mg Intramuscular Q6H PRN Thee Gross MD     • valACYclovir  500 mg Oral Daily Thee Gross MD          Today, Patient Was Seen By: Tana Blanca PA-C    **Please Note: This note may have been constructed using a voice recognition system  **

## 2023-03-25 NOTE — CONSULTS
MRSA swab is negative  Per ID, d/c vancomycin therapy if the result is negative  Thus, the patient’s vancomycin therapy has been completed / discontinued  Thank you for allowing us to take part in this patient's care  Pharmacy will sign-off now; please call or re-consult if there are any questions      Kaleb Distance, PharmD, BCPS

## 2023-03-25 NOTE — PLAN OF CARE
Problem: Potential for Falls  Goal: Patient will remain free of falls  Description: INTERVENTIONS:  - Educate patient/family on patient safety including physical limitations  - Instruct patient to call for assistance with activity   - Consult OT/PT to assist with strengthening/mobility   - Keep Call bell within reach  - Keep bed low and locked with side rails adjusted as appropriate  - Keep care items and personal belongings within reach  - Initiate and maintain comfort rounds  - Make Fall Risk Sign visible to staff  - Offer Toileting every  Hours, in advance of need  - Initiate/Maintain alarm  - Obtain necessary fall risk management equipment:  - Apply yellow socks and bracelet for high fall risk patients  - Consider moving patient to room near nurses station  Outcome: Progressing     Problem: PAIN - ADULT  Goal: Verbalizes/displays adequate comfort level or baseline comfort level  Description: Interventions:  - Encourage patient to monitor pain and request assistance  - Assess pain using appropriate pain scale  - Administer analgesics based on type and severity of pain and evaluate response  - Implement non-pharmacological measures as appropriate and evaluate response  - Consider cultural and social influences on pain and pain management  - Notify physician/advanced practitioner if interventions unsuccessful or patient reports new pain  Outcome: Progressing     Problem: INFECTION - ADULT  Goal: Absence or prevention of progression during hospitalization  Description: INTERVENTIONS:  - Assess and monitor for signs and symptoms of infection  - Monitor lab/diagnostic results  - Monitor all insertion sites, i e  indwelling lines, tubes, and drains  - Monitor endotracheal if appropriate and nasal secretions for changes in amount and color  - [place] appropriate cooling/warming therapies per order  - Administer medications as ordered  - Instruct and encourage patient and family to use good hand hygiene technique  - Identify and instruct in appropriate isolation precautions for identified infection/condition  Outcome: Progressing  Goal: Absence of fever/infection during neutropenic period  Description: INTERVENTIONS:  - Monitor WBC    Outcome: Progressing     Problem: SAFETY ADULT  Goal: Patient will remain free of falls  Description: INTERVENTIONS:  - Educate patient/family on patient safety including physical limitations  - Instruct patient to call for assistance with activity   - Consult OT/PT to assist with strengthening/mobility   - Keep Call bell within reach  - Keep bed low and locked with side rails adjusted as appropriate  - Keep care items and personal belongings within reach  - Initiate and maintain comfort rounds  - Make Fall Risk Sign visible to staff  - Offer Toileting every  Hours, in advance of need  - Initiate/Maintain alarm  - Obtain necessary fall risk management equipment:  - Apply yellow socks and bracelet for high fall risk patients  - Consider moving patient to room near nurses station  Outcome: Progressing  Goal: Maintain or return to baseline ADL function  Description: INTERVENTIONS:  -  Assess patient's ability to carry out ADLs; assess patient's baseline for ADL function and identify physical deficits which impact ability to perform ADLs (bathing, care of mouth/teeth, toileting, grooming, dressing, etc )  - Assess/evaluate cause of self-care deficits   - Assess range of motion  - Assess patient's mobility; develop plan if impaired  - Assess patient's need for assistive devices and provide as appropriate  - Encourage maximum independence but intervene and supervise when necessary  - Involve family in performance of ADLs  - Assess for home care needs following discharge   - Consider OT consult to assist with ADL evaluation and planning for discharge  - Provide patient education as appropriate  Outcome: Progressing  Goal: Maintains/Returns to pre admission functional level  Description: INTERVENTIONS:  - Perform BMAT or MOVE assessment daily    - Set and communicate daily mobility goal to care team and patient/family/caregiver  - Collaborate with rehabilitation services on mobility goals if consulted  - Perform Range of Motion  times a day  - Reposition patient every  hours  - Dangle patient  times a day  - Stand patient  times a day  - Ambulate patient  times a day  - Out of bed to chair  times a day   - Out of bed for meals times a day  - Out of bed for toileting  - Record patient progress and toleration of activity level   Outcome: Progressing     Problem: DISCHARGE PLANNING  Goal: Discharge to home or other facility with appropriate resources  Description: INTERVENTIONS:  - Identify barriers to discharge w/patient and caregiver  - Arrange for needed discharge resources and transportation as appropriate  - Identify discharge learning needs (meds, wound care, etc )  - Arrange for interpretive services to assist at discharge as needed  - Refer to Case Management Department for coordinating discharge planning if the patient needs post-hospital services based on physician/advanced practitioner order or complex needs related to functional status, cognitive ability, or social support system  Outcome: Progressing     Problem: Knowledge Deficit  Goal: Patient/family/caregiver demonstrates understanding of disease process, treatment plan, medications, and discharge instructions  Description: Complete learning assessment and assess knowledge base    Interventions:  - Provide teaching at level of understanding  - Provide teaching via preferred learning methods  Outcome: Progressing

## 2023-03-26 PROBLEM — E87.6 HYPOKALEMIA: Status: RESOLVED | Noted: 2021-04-15 | Resolved: 2023-03-26

## 2023-03-26 LAB
ANION GAP SERPL CALCULATED.3IONS-SCNC: 8 MMOL/L (ref 4–13)
BASOPHILS # BLD AUTO: 0.09 THOUSANDS/ÂΜL (ref 0–0.1)
BASOPHILS NFR BLD AUTO: 1 % (ref 0–1)
BUN SERPL-MCNC: 10 MG/DL (ref 5–25)
CALCIUM SERPL-MCNC: 8.5 MG/DL (ref 8.4–10.2)
CHLORIDE SERPL-SCNC: 103 MMOL/L (ref 96–108)
CO2 SERPL-SCNC: 26 MMOL/L (ref 21–32)
CREAT SERPL-MCNC: 0.75 MG/DL (ref 0.6–1.3)
EOSINOPHIL # BLD AUTO: 0.86 THOUSAND/ÂΜL (ref 0–0.61)
EOSINOPHIL NFR BLD AUTO: 11 % (ref 0–6)
ERYTHROCYTE [DISTWIDTH] IN BLOOD BY AUTOMATED COUNT: 14.9 % (ref 11.6–15.1)
GFR SERPL CREATININE-BSD FRML MDRD: 89 ML/MIN/1.73SQ M
GLUCOSE SERPL-MCNC: 126 MG/DL (ref 65–140)
GLUCOSE SERPL-MCNC: 140 MG/DL (ref 65–140)
GLUCOSE SERPL-MCNC: 186 MG/DL (ref 65–140)
GLUCOSE SERPL-MCNC: 193 MG/DL (ref 65–140)
GLUCOSE SERPL-MCNC: 234 MG/DL (ref 65–140)
HCT VFR BLD AUTO: 32.1 % (ref 36.5–49.3)
HGB BLD-MCNC: 10.3 G/DL (ref 12–17)
IMM GRANULOCYTES # BLD AUTO: 0.04 THOUSAND/UL (ref 0–0.2)
IMM GRANULOCYTES NFR BLD AUTO: 1 % (ref 0–2)
LYMPHOCYTES # BLD AUTO: 1.26 THOUSANDS/ÂΜL (ref 0.6–4.47)
LYMPHOCYTES NFR BLD AUTO: 16 % (ref 14–44)
MCH RBC QN AUTO: 28.1 PG (ref 26.8–34.3)
MCHC RBC AUTO-ENTMCNC: 32.1 G/DL (ref 31.4–37.4)
MCV RBC AUTO: 88 FL (ref 82–98)
MONOCYTES # BLD AUTO: 0.74 THOUSAND/ÂΜL (ref 0.17–1.22)
MONOCYTES NFR BLD AUTO: 9 % (ref 4–12)
NEUTROPHILS # BLD AUTO: 5.1 THOUSANDS/ÂΜL (ref 1.85–7.62)
NEUTS SEG NFR BLD AUTO: 62 % (ref 43–75)
NRBC BLD AUTO-RTO: 0 /100 WBCS
PLATELET # BLD AUTO: 293 THOUSANDS/UL (ref 149–390)
PMV BLD AUTO: 9.9 FL (ref 8.9–12.7)
POTASSIUM SERPL-SCNC: 4 MMOL/L (ref 3.5–5.3)
RBC # BLD AUTO: 3.66 MILLION/UL (ref 3.88–5.62)
SODIUM SERPL-SCNC: 137 MMOL/L (ref 135–147)
WBC # BLD AUTO: 8.09 THOUSAND/UL (ref 4.31–10.16)

## 2023-03-26 RX ADMIN — PANTOPRAZOLE SODIUM 40 MG: 40 TABLET, DELAYED RELEASE ORAL at 04:56

## 2023-03-26 RX ADMIN — SODIUM CHLORIDE SOLN NEBU 3% 4 ML: 3 NEBU SOLN at 20:16

## 2023-03-26 RX ADMIN — GUAIFENESIN 600 MG: 600 TABLET ORAL at 21:03

## 2023-03-26 RX ADMIN — SODIUM CHLORIDE SOLN NEBU 3% 4 ML: 3 NEBU SOLN at 13:40

## 2023-03-26 RX ADMIN — SODIUM CHLORIDE SOLN NEBU 3% 4 ML: 3 NEBU SOLN at 07:49

## 2023-03-26 RX ADMIN — VALACYCLOVIR HYDROCHLORIDE 500 MG: 500 TABLET, FILM COATED ORAL at 09:30

## 2023-03-26 RX ADMIN — INSULIN LISPRO 1 UNITS: 100 INJECTION, SOLUTION INTRAVENOUS; SUBCUTANEOUS at 17:20

## 2023-03-26 RX ADMIN — GUAIFENESIN 600 MG: 600 TABLET ORAL at 09:30

## 2023-03-26 RX ADMIN — METOPROLOL SUCCINATE 100 MG: 50 TABLET, EXTENDED RELEASE ORAL at 09:30

## 2023-03-26 RX ADMIN — CEFEPIME HYDROCHLORIDE 2000 MG: 2 INJECTION, SOLUTION INTRAVENOUS at 17:20

## 2023-03-26 RX ADMIN — CEFEPIME HYDROCHLORIDE 2000 MG: 2 INJECTION, SOLUTION INTRAVENOUS at 09:30

## 2023-03-26 RX ADMIN — INSULIN LISPRO 1 UNITS: 100 INJECTION, SOLUTION INTRAVENOUS; SUBCUTANEOUS at 21:04

## 2023-03-26 RX ADMIN — ASPIRIN 81 MG: 81 TABLET, COATED ORAL at 09:30

## 2023-03-26 RX ADMIN — LEVALBUTEROL HYDROCHLORIDE 1.25 MG: 1.25 SOLUTION, CONCENTRATE RESPIRATORY (INHALATION) at 07:49

## 2023-03-26 RX ADMIN — LEVALBUTEROL HYDROCHLORIDE 1.25 MG: 1.25 SOLUTION, CONCENTRATE RESPIRATORY (INHALATION) at 13:40

## 2023-03-26 RX ADMIN — ENOXAPARIN SODIUM 40 MG: 100 INJECTION SUBCUTANEOUS at 09:30

## 2023-03-26 RX ADMIN — LEVALBUTEROL HYDROCHLORIDE 1.25 MG: 1.25 SOLUTION, CONCENTRATE RESPIRATORY (INHALATION) at 20:16

## 2023-03-26 RX ADMIN — ATORVASTATIN CALCIUM 40 MG: 40 TABLET, FILM COATED ORAL at 17:20

## 2023-03-26 RX ADMIN — INSULIN LISPRO 2 UNITS: 100 INJECTION, SOLUTION INTRAVENOUS; SUBCUTANEOUS at 12:27

## 2023-03-26 NOTE — ASSESSMENT & PLAN NOTE
· Blood pressure stable  · Continue metoprolol   · Lasix held on admission  Appears euvolemic  Plan for bronch tomorrow, NPO after midnight    Consider resuming in 24 hrs

## 2023-03-26 NOTE — PLAN OF CARE
Problem: Potential for Falls  Goal: Patient will remain free of falls  Description: INTERVENTIONS:  - Educate patient/family on patient safety including physical limitations  - Instruct patient to call for assistance with activity   - Consult OT/PT to assist with strengthening/mobility   - Keep Call bell within reach  - Keep bed low and locked with side rails adjusted as appropriate  - Keep care items and personal belongings within reach  - Initiate and maintain comfort rounds  - Make Fall Risk Sign visible to staff  - Offer Toileting every  Hours, in advance of need  - Initiate/Maintain alarm  - Obtain necessary fall risk management equipment:  - Apply yellow socks and bracelet for high fall risk patients  - Consider moving patient to room near nurses station  Outcome: Progressing     Problem: PAIN - ADULT  Goal: Verbalizes/displays adequate comfort level or baseline comfort level  Description: Interventions:  - Encourage patient to monitor pain and request assistance  - Assess pain using appropriate pain scale  - Administer analgesics based on type and severity of pain and evaluate response  - Implement non-pharmacological measures as appropriate and evaluate response  - Consider cultural and social influences on pain and pain management  - Notify physician/advanced practitioner if interventions unsuccessful or patient reports new pain  Outcome: Progressing     Problem: INFECTION - ADULT  Goal: Absence or prevention of progression during hospitalization  Description: INTERVENTIONS:  - Assess and monitor for signs and symptoms of infection  - Monitor lab/diagnostic results  - Monitor all insertion sites, i e  indwelling lines, tubes, and drains  - Monitor endotracheal if appropriate and nasal secretions for changes in amount and color  - [place] appropriate cooling/warming therapies per order  - Administer medications as ordered  - Instruct and encourage patient and family to use good hand hygiene technique  - Identify and instruct in appropriate isolation precautions for identified infection/condition  Outcome: Progressing  Goal: Absence of fever/infection during neutropenic period  Description: INTERVENTIONS:  - Monitor WBC    Outcome: Progressing     Problem: SAFETY ADULT  Goal: Patient will remain free of falls  Description: INTERVENTIONS:  - Educate patient/family on patient safety including physical limitations  - Instruct patient to call for assistance with activity   - Consult OT/PT to assist with strengthening/mobility   - Keep Call bell within reach  - Keep bed low and locked with side rails adjusted as appropriate  - Keep care items and personal belongings within reach  - Initiate and maintain comfort rounds  - Make Fall Risk Sign visible to staff  - Offer Toileting every  Hours, in advance of need  - Initiate/Maintain alarm  - Obtain necessary fall risk management equipment:  - Apply yellow socks and bracelet for high fall risk patients  - Consider moving patient to room near nurses station  Outcome: Progressing  Goal: Maintain or return to baseline ADL function  Description: INTERVENTIONS:  -  Assess patient's ability to carry out ADLs; assess patient's baseline for ADL function and identify physical deficits which impact ability to perform ADLs (bathing, care of mouth/teeth, toileting, grooming, dressing, etc )  - Assess/evaluate cause of self-care deficits   - Assess range of motion  - Assess patient's mobility; develop plan if impaired  - Assess patient's need for assistive devices and provide as appropriate  - Encourage maximum independence but intervene and supervise when necessary  - Involve family in performance of ADLs  - Assess for home care needs following discharge   - Consider OT consult to assist with ADL evaluation and planning for discharge  - Provide patient education as appropriate  Outcome: Progressing  Goal: Maintains/Returns to pre admission functional level  Description: INTERVENTIONS:  - Perform BMAT or MOVE assessment daily    - Set and communicate daily mobility goal to care team and patient/family/caregiver  - Collaborate with rehabilitation services on mobility goals if consulted  - Perform Range of Motion  times a day  - Reposition patient every  hours  - Dangle patient  times a day  - Stand patient  times a day  - Ambulate patient  times a day  - Out of bed to chair  times a day   - Out of bed for meals times a day  - Out of bed for toileting  - Record patient progress and toleration of activity level   Outcome: Progressing     Problem: DISCHARGE PLANNING  Goal: Discharge to home or other facility with appropriate resources  Description: INTERVENTIONS:  - Identify barriers to discharge w/patient and caregiver  - Arrange for needed discharge resources and transportation as appropriate  - Identify discharge learning needs (meds, wound care, etc )  - Arrange for interpretive services to assist at discharge as needed  - Refer to Case Management Department for coordinating discharge planning if the patient needs post-hospital services based on physician/advanced practitioner order or complex needs related to functional status, cognitive ability, or social support system  Outcome: Progressing     Problem: Knowledge Deficit  Goal: Patient/family/caregiver demonstrates understanding of disease process, treatment plan, medications, and discharge instructions  Description: Complete learning assessment and assess knowledge base    Interventions:  - Provide teaching at level of understanding  - Provide teaching via preferred learning methods  Outcome: Progressing

## 2023-03-26 NOTE — ASSESSMENT & PLAN NOTE
· Initially started on IV cefepime/vancomycin  · MRSA swab negative -discontinue vancomycin  · Initial leukocytosis now resolved    Remains afebrile  · Blood cultures negative x2 after 48 hours  · Trend WBC and fever curve  · Plan for bronchoscopy tomorrow, Monday, 3/27

## 2023-03-26 NOTE — PROGRESS NOTES
[place]  Progress Note  Name: Kyaw Coley  MRN: [id number]  Unit/Bed#: -01 I Date of Admission: 3/23/2023   Date of Service: 3/26/2023 I Hospital Day: 3    Assessment/Plan   * Severe sepsis [facility])  Assessment & Plan  · Presenting with worsening SOB with cold-like symptoms for the past 3 weeks  Completed a course of doxycycline  · Chest x-ray showing pneumonia  · CTA PE showing: No PE, severe multifocal right pneumonia, moderate interstitial edema, stable small loculated left and trace right pleural effusion  · Troponins negative  · Severe sepsis criteria with heart rate 97, respiratory rate 20 leukocytosis of 17 94, lactic acidosis in setting of pneumonia  · Urine antigens negative  · Blood cultures negative x2 after 48 hours  · MRSA swab negative -discontinue vancomycin  · Sputum cx pending  · Mildly elevated procalcitonin, downtrending  · Plan for bronchoscopy on Monday with pulmonology  · IGg 1,2,3,4, TANYA, ANCA, antimyeloperoxidase, antiproteinase, CCP, glomerular basement membrane antibody, RF Aspergillus antibodies and Aspergillus galactomannan antigen pending  · COVID negative  · Continue IV cefepime    Acute respiratory insufficiency  Assessment & Plan  · Initially requiring 4L NC-> has since been weaned to room air  · Resolved  · See mgx of sepsis    Pneumonia of right lower lobe due to infectious organism  Assessment & Plan  · Initially started on IV cefepime/vancomycin  · MRSA swab negative -discontinue vancomycin  · Initial leukocytosis now resolved    Remains afebrile  · Blood cultures negative x2 after 48 hours  · Trend WBC and fever curve  · Plan for bronchoscopy tomorrow, Monday, 3/27    Paroxysmal atrial fibrillation (HCC)  Assessment & Plan  · Rate controlled on metoprolol  · Not on anticoagulation as outpatient    Chronic diastolic congestive heart failure (HCC)  Assessment & Plan  Wt Readings from Last 3 Encounters:   03/26/23 73 4 kg (161 lb 13 1 oz) 03/13/23 73 2 kg (161 lb 6 4 oz)   03/01/23 73 5 kg (162 lb)     · Patient appeared hypovolemic on presentation but now appears euvolemic  · Home Lasix 20 mg twice daily held on admission  · Will be NPO after midnight for bronch - continue to hold for now  ·   · Echo done on 5/2/2022 showing EF of 98% systolic function low normal grade 2 diastolic dysfunction aortic valve biprosthetic valve  · Low-sodium diet    Essential hypertension  Assessment & Plan  · Blood pressure stable  · Continue metoprolol   · Lasix held on admission  Appears euvolemic  Plan for bronch tomorrow, NPO after midnight  Consider resuming in 24 hrs    Multiple myeloma not having achieved remission [facility])  Assessment & Plan  · Follows with [facility] oncology  · On daratumumab, Velcade and dexamethasone-> hold d/t sepsis  · Patient reports he has been getting chemotherapy monthly, most recently end of February  States he would be due for treatment 3/30    Dyslipidemia  Assessment & Plan  · Continue statin therapy    Type 2 diabetes mellitus without complication, without long-term current use of insulin [facility])  Assessment & Plan  Lab Results   Component Value Date    HGBA1C 8 9 (H) 02/01/2023       Recent Labs     03/25/23  1621 03/25/23  2039 03/26/23  0708 03/26/23  1120   POCGLU 191* 237* 126 234*       Blood Sugar Average: Last 72 hrs:     · Hemoglobin A1c shows poor control at 8 9  · However during hospitalization sugars have been well controlled on SSI plus Accu-Chek  · Diabetic diet    Hypokalemia-resolved as of 3/26/2023  Assessment & Plan  · Potassium 3 0 on admission  · Received repletion  · Resolved  · Trend BMP         VTE Pharmacologic Prophylaxis: VTE Score: 7 High Risk (Score >/= 5) - Pharmacological DVT Prophylaxis Ordered: enoxaparin (Lovenox)  Sequential Compression Devices Ordered  Patient Centered Rounds: I performed bedside rounds with nursing staff today    Discussions with Specialists or Other Care Team Provider: Appreciate pulm input    Education and Discussions with Family / Patient: Updated  (wife) via phone  Total Time Spent on Date of Encounter in care of patient: 35 minutes This time was spent on one or more of the following: performing physical exam; counseling and coordination of care; obtaining or reviewing history; documenting in the medical record; reviewing/ordering tests, medications or procedures; communicating with other healthcare professionals and discussing with patient's family/caregivers  Current Length of Stay: 3 day(s)  Current Patient Status: Inpatient   Certification Statement: The patient will continue to require additional inpatient hospital stay due to Bronchoscopy  Discharge Plan: Anticipate discharge in 48 hrs to home  Code Status: Level 1 - Full Code    Subjective:   Patient overall feels well  Denies chest pain/palpitations, shortness of breath, nausea/vomiting, abdominal pain  Reports poor sleep overnight  Objective:     Vitals:   Temp (24hrs), Av 8 °F (36 6 °C), Min:97 4 °F (36 3 °C), Max:98 2 °F (36 8 °C)    Temp:  [97 4 °F (36 3 °C)-98 2 °F (36 8 °C)] 97 4 °F (36 3 °C)  HR:  [78-87] 81  Resp:  [16-18] 16  BP: (117-146)/(59-75) 144/74  SpO2:  [93 %-100 %] 100 %  Body mass index is 22 57 kg/m²  Input and Output Summary (last 24 hours): Intake/Output Summary (Last 24 hours) at 3/26/2023 1221  Last data filed at 3/26/2023 0931  Gross per 24 hour   Intake 540 ml   Output 750 ml   Net -210 ml       Physical Exam:   Physical Exam  Vitals and nursing note reviewed  Constitutional:       General: He is not in acute distress  Appearance: He is well-developed  Comments: No acute distress   HENT:      Head: Normocephalic and atraumatic  Eyes:      General: No scleral icterus  Extraocular Movements: Extraocular movements intact        Conjunctiva/sclera: Conjunctivae normal    Cardiovascular:      Rate and Rhythm: Normal rate and regular rhythm  Heart sounds: No murmur heard  Pulmonary:      Effort: Pulmonary effort is normal  No respiratory distress  Breath sounds: Rhonchi present  Abdominal:      General: Bowel sounds are normal       Palpations: Abdomen is soft  Tenderness: There is no abdominal tenderness  There is no guarding or rebound  Musculoskeletal:         General: No swelling  Cervical back: Normal range of motion  Comments: Able to move upper/lower extremities bilaterally, no edema   Skin:     General: Skin is warm and dry  Capillary Refill: Capillary refill takes less than 2 seconds  Neurological:      Mental Status: He is alert and oriented to person, place, and time  Psychiatric:         Mood and Affect: Mood normal          Speech: Speech normal          Behavior: Behavior normal           Additional Data:     Labs:  Results from last 7 days   Lab Units 03/26/23  0456   WBC Thousand/uL 8 09   HEMOGLOBIN g/dL 10 3*   HEMATOCRIT % 32 1*   PLATELETS Thousands/uL 293   NEUTROS PCT % 62   LYMPHS PCT % 16   MONOS PCT % 9   EOS PCT % 11*     Results from last 7 days   Lab Units 03/26/23  0456 03/25/23  0501 03/24/23  0433   SODIUM mmol/L 137   < > 137   POTASSIUM mmol/L 4 0   < > 3 5   CHLORIDE mmol/L 103   < > 103   CO2 mmol/L 26   < > 26   BUN mg/dL 10   < > 11   CREATININE mg/dL 0 75   < > 0 81   ANION GAP mmol/L 8   < > 8   CALCIUM mg/dL 8 5   < > 8 5   ALBUMIN g/dL  --   --  3 4*   TOTAL BILIRUBIN mg/dL  --   --  0 55   ALK PHOS U/L  --   --  70   ALT U/L  --   --  7   AST U/L  --   --  12*   GLUCOSE RANDOM mg/dL 140   < > 116    < > = values in this interval not displayed       Results from last 7 days   Lab Units 03/23/23  0739   INR  0 98     Results from last 7 days   Lab Units 03/26/23  1120 03/26/23  0708 03/25/23  2039 03/25/23  1621 03/25/23  1140 03/25/23  0710 03/24/23  2133 03/24/23  1711 03/24/23  1121 03/24/23  0734 03/23/23  1558 03/23/23  1233   POC GLUCOSE mg/dl 234* 126 237* 191* 204* 132 233* 157* 158* 110 133 125         Results from last 7 days   Lab Units 03/25/23  0501 03/24/23  0433 03/23/23  0941 03/23/23  0739   LACTIC ACID mmol/L  --   --  1 4 2 3*   PROCALCITONIN ng/ml 0 27* 0 46*  --  0 19       Lines/Drains:  Invasive Devices     Peripheral Intravenous Line  Duration           Peripheral IV 03/23/23 Right Antecubital 3 days                      Imaging: No pertinent imaging reviewed  Recent Cultures (last 7 days):   Results from last 7 days   Lab Units 03/23/23  1056 03/23/23  0739   BLOOD CULTURE   --  No Growth at 48 hrs  No Growth at 24 hrs     LEGIONELLA URINARY ANTIGEN  Negative  --        Last 24 Hours Medication List:   Current Facility-Administered Medications   Medication Dose Route Frequency Provider Last Rate   • acetaminophen  650 mg Oral Q6H PRN Raman Roldan MD     • aluminum-magnesium hydroxide-simethicone  30 mL Oral Q4H PRN Raman Roldan MD     • aspirin  81 mg Oral Daily Raman Roldan MD     • atorvastatin  40 mg Oral Daily With Claudia De Los Santos MD     • benzonatate  100 mg Oral TID PRN Raman Roldan MD     • cefepime  2,000 mg Intravenous Q8H Raman Roldan MD 2,000 mg (03/26/23 0930)   • diphenhydrAMINE  25 mg Intravenous Q6H PRN Raman Roldan MD     • enoxaparin  40 mg Subcutaneous Daily Raman Roldan MD     • guaiFENesin  600 mg Oral Q12H Keshia Mora MD     • insulin lispro  1-5 Units Subcutaneous TID AC Raman Roldan MD     • insulin lispro  1-5 Units Subcutaneous HS Raman Roldan MD     • levalbuterol  1 25 mg Nebulization TID Ginger Chen MD     • metoprolol succinate  100 mg Oral Daily Raman Roldan MD     • pantoprazole  40 mg Oral Early Morning Raman Roldan MD     • polyethylene glycol  17 g Oral Daily PRN Raman Roldan MD     • sodium chloride  4 mL Nebulization TID Ginger Chen MD     • trimethobenzamide  200 mg Intramuscular Q6H PRN Raman Roldan MD     • valACYclovir  500 mg Oral Daily Raman Roldan MD          Today, Patient Was Seen By: Karina Vilchis PA-C    **Please Note: This note may have been constructed using a voice recognition system  **

## 2023-03-26 NOTE — PROGRESS NOTES
Pulmonary Progress Note  Rolando Brody 69 y o  male MRN: [id number]  Unit/Bed#: -01 Encounter: [id number]      Assessment  Acute hypoxic respiratory failure - improving  Abnormal CT chest with diffuse alveolar infiltrates, more prominent in the right lung, interlobular thickening, diffuse ground glass opacities  Recurrent pulmonary infections  History of COVID 19 infections  History of DAH possibly due to infection/edema/Eliquis  History of strep pneumo pneumonia  Multiple myeloma currently on chemotherapy  Chronic diastolic heart failure     Cancer history  Hodgkin's, non-hodgkins - treated in [id number] - chemotherapy/radiation  Radiation to the neck and eyelid  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  Colon cancer in 2013 with partial colonic resection  Melanoma with excisions int he past    Recommendations: This is a complicated patient, with multiple malignancies noted in his history and currently is undergoing treatment with daratumumab (CD38 binding mAb), Velcade, dexamethasone for multiple myeloma     He has had recurrent infections in the past, albeit with different organisms  His CT chest have regularly shown diffuse patchy groundglass opacities with interlobular thickening  There is also prominent alveolar patchy infiltrates of variable and migratory distribution in his last 3 CT scans, most prominently in the right lung     In the setting of his history, these abnormal CT findings represent a broad differential, including: Infectious pneumonia, bacterial, atypical, PJP, Aspergillus, fungal   Diffuse alveolar hemorrhage  Chronic eosinophilic pneumonia  Immune mediated organizing pneumonia  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  Less likely extension of hematologic malignancies     I noted elevated absolute eosinophil counts of 700 in May 2021, 880 in 2018 and 630 on this admission    He had a bronchoscopy with BAL in late May 2022 with 0% eosinophils, but of course that was after significant courses of steroid therapy     As far as respiratory cultures, he had a positive strep pneumo urine antigen in April 2022, positive COVID-19 in April 2021 and December 2022, and positive rhino/enterovirus in Feb 2023     With his myeloma hx/immunosuppression I checked immunoglobulins and expectedly they are low  He is receiving IVIG qmonth to 2 months with his oncologist        Pending Aspergillus antibodies, Aspergillus galactomannan antigen      I recommend a bronchoscopy/BAL on Monday with bacterial/fungal/AFB/PJP cultures and cell count to rule out atypical infections, immune mediated processes, noninfectious causes of recurrent patchy alveolar infiltrates including chronic eosinophilic pneumonia, DAH  I ordered NPO at [facility] and PT/INR/PTT     In the potential setting that the BAL reveals [id number], I have sent for autoimmune serologies - TANYA is negative and rest is pending  These results are likely deranged and difficult to interpret in the setting of MM and treatment of MM     If BAL was unrevealing, he should get outpatient follow-up CT to evaluate these alveolar right-sided pulmonary infiltrates  He can be considered for transbronchial biopsies if they are persistent      Meanwhile w/ WBC and procalcitonin, can continue cefepime currently day 3, awaiting finalization of respiratory cultures  I added bronchodilators with hypertonic saline     I discussed the plan with the patient  Chief Complaint:  i'm doing better    Subjective:  He is improving with less dyspnea and less cough with mucus production  He is unable to expectorate enough for a sputum sample        Objective:  Vitals: Vitals personally reviewed  Vitals:    03/25/23 2125 03/26/23 0457 03/26/23 0750 03/26/23 0806   BP: 146/75   144/74   Pulse: 78   81   Resp: 18   16   Temp: 97 8 °F (36 6 °C)   (!) 97 4 °F (36 3 °C)   TempSrc:       SpO2: 94%  93% 100%   Weight:  73 4 kg (161 lb 13 1 oz) Height:          Body mass index is 22 57 kg/m²  Intake/Output Summary (Last 24 hours) at 3/26/2023 1000  Last data filed at 3/26/2023 0931  Gross per 24 hour   Intake 540 ml   Output 750 ml   Net -210 ml     Invasive Devices     Peripheral Intravenous Line  Duration           Peripheral IV 03/23/23 Right Antecubital 3 days                Physical Exam  General: Awake alert and oriented x 3, conversant without conversational dyspnea, NAD, normal affect  HEENT:  PERRL, Sclera noninjected, nonicteric OU, Nares patent, no nasal flaring, no nasal drainage, Mucous membranes, moist, no oral lesions, normal dentition  NECK: Trachea midline, no accessory muscle use, no stridor  CARDIAC: Reg, single s1/S2, no m/r/g  PULM: Diffuse rhonchi and wheezing, coarse breath sounds in both lungs  CHEST: No gross deformities, equal chest expansion on inspiration bilaterally  ABD: Soft, nontender, nondistended, no rebound, no rigidity, no guarding  EXT: No cyanosis, no clubbing, no edema, normal capillary refill  SKIN:  No rashes, no lesions  NEURO: no focal neurologic deficits, AAOx3, moving all extremities appropriately    Labs: I have personally reviewed pertinent lab results    Laboratory and Diagnostics  Results from last 7 days   Lab Units 03/26/23  0456 03/25/23  0501 03/24/23  0433 03/23/23  0739   WBC Thousand/uL 8 09 9 87 15 01* 17 94*   HEMOGLOBIN g/dL 10 3* 10 1* 11 7* 12 2   HEMATOCRIT % 32 1* 31 0* 35 5* 37 4   PLATELETS Thousands/uL 293 270 276 308   NEUTROS PCT % 62 65  --  78*   MONOS PCT % 9 9  --  8     Results from last 7 days   Lab Units 03/26/23  0456 03/25/23  0501 03/24/23  0433 03/23/23  0739   SODIUM mmol/L 137 137 137 139   POTASSIUM mmol/L 4 0 3 7 3 5 3 0*   CHLORIDE mmol/L 103 104 103 99   CO2 mmol/L 26 26 26 28   ANION GAP mmol/L 8 7 8 12   BUN mg/dL 10 10 11 17   CREATININE mg/dL 0 75 0 69 0 81 0 91   CALCIUM mg/dL 8 5 8 4 8 5 9 0   GLUCOSE RANDOM mg/dL 140 131 116 186*   ALT U/L  --   --  7 9   AST U/L  --   --  12* 13   ALK PHOS U/L  --   --  70 88   ALBUMIN g/dL  --   --  3 4* 3 8   TOTAL BILIRUBIN mg/dL  --   --  0 55 0 50     Results from last 7 days   Lab Units 03/25/23  0501 03/24/23  0433   MAGNESIUM mg/dL 2 2 1 7*      Results from last 7 days   Lab Units 03/23/23  0739   INR  0 98   PTT seconds 27          Results from last 7 days   Lab Units 03/23/23  0941 03/23/23  0739   LACTIC ACID mmol/L 1 4 2 3*                 Results from last 7 days   Lab Units 03/23/23  0739   D-DIMER QUANTITATIVE ug/ml FEU 1 38*     Results from last 7 days   Lab Units 03/25/23  0501 03/24/23  0433 03/23/23  0739   PROCALCITONIN ng/ml 0 27* 0 46* 0 19     3/23/2023 -full viral panel, not detected, Legionella antigen negative, strep pneumo antigen negative, blood cultures in progress, COVID/flu/RSV negative, MRSA culture in process        Imaging and other studies: I have personally reviewed pertinent films in PACS  3/23/2023 CTA chest -diffuse multifocal infiltrates, along bronchovascular pattern, patchy alveolar, central distribution  Stable small loculated left and trace right pleural effusion  Groundglass and interlobular thickening throughout both lungs  Right lung findings are less confluent compared to December 2022 but persistent  Persistent mediastinal lymphadenopathy     Comparison of CAT scans from December 2022 (left) and 3/23/2023 (right)           CT chest April 2022     Biopsies  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  1/20/2022 lymph node right neck level 5, benign lymph node  12/14/2021 lymph node right level 2 atypical cellular changes  5/15/2021 right middle lobe bronchial alveolar lavage negative for malignancy, 60% macrophage, 24% neutrophils     Pulmonary function testing: none     Echocardiogram:   5/2/2022  •  Left Ventricle: Left ventricular cavity size is normal  The left ventricular ejection fraction is 55%   Systolic function is low normal  Wall motion is normal  "Diastolic function is moderately abnormal, consistent with grade II (pseudonormal) relaxation  Wall thickness is mildly increased  •  Right Ventricle: Systolic function is mildly reduced  •  Left Atrium: The atrium is mildly dilated  •  Right Atrium: The atrium is mildly dilated  •  Aortic Valve: There is a bioprosthetic valve  The prosthetic valve appears to be functioning normally  •  Mitral Valve: There is mild regurgitation  •  Tricuspid Valve: There is mild regurgitation  •  Pulmonic Valve: There is mild regurgitation        Code Status: Level 1 - Full Code      Shiva Guthrie MD  Pulmonary, Critical Care and Sleep Medicine  [facility]     Portions of the record may have been created with voice recognition software  Occasional wrong word or \"sound a like\" substitutions may have occurred due to the inherent limitations of voice recognition software  Please read the chart carefully and recognize, using context, where substitutions have occurred       "

## 2023-03-26 NOTE — PLAN OF CARE
Problem: Potential for Falls  Goal: Patient will remain free of falls  Description: INTERVENTIONS:  - Educate patient/family on patient safety including physical limitations  - Instruct patient to call for assistance with activity   - Consult OT/PT to assist with strengthening/mobility   - Keep Call bell within reach  - Keep bed low and locked with side rails adjusted as appropriate  - Keep care items and personal belongings within reach  - Initiate and maintain comfort rounds  - Make Fall Risk Sign visible to staff  - Offer Toileting every  Hours, in advance of need  - Initiate/Maintain alarm  - Obtain necessary fall risk management equipment:  - Apply yellow socks and bracelet for high fall risk patients  - Consider moving patient to room near nurses station  Outcome: Progressing     Problem: PAIN - ADULT  Goal: Verbalizes/displays adequate comfort level or baseline comfort level  Description: Interventions:  - Encourage patient to monitor pain and request assistance  - Assess pain using appropriate pain scale  - Administer analgesics based on type and severity of pain and evaluate response  - Implement non-pharmacological measures as appropriate and evaluate response  - Consider cultural and social influences on pain and pain management  - Notify physician/advanced practitioner if interventions unsuccessful or patient reports new pain  Outcome: Progressing     Problem: INFECTION - ADULT  Goal: Absence or prevention of progression during hospitalization  Description: INTERVENTIONS:  - Assess and monitor for signs and symptoms of infection  - Monitor lab/diagnostic results  - Monitor all insertion sites, i e  indwelling lines, tubes, and drains  - Monitor endotracheal if appropriate and nasal secretions for changes in amount and color  - [place] appropriate cooling/warming therapies per order  - Administer medications as ordered  - Instruct and encourage patient and family to use good hand hygiene technique  - Identify and instruct in appropriate isolation precautions for identified infection/condition  Outcome: Progressing  Goal: Absence of fever/infection during neutropenic period  Description: INTERVENTIONS:  - Monitor WBC    Outcome: Progressing     Problem: SAFETY ADULT  Goal: Patient will remain free of falls  Description: INTERVENTIONS:  - Educate patient/family on patient safety including physical limitations  - Instruct patient to call for assistance with activity   - Consult OT/PT to assist with strengthening/mobility   - Keep Call bell within reach  - Keep bed low and locked with side rails adjusted as appropriate  - Keep care items and personal belongings within reach  - Initiate and maintain comfort rounds  - Make Fall Risk Sign visible to staff  - Offer Toileting every  Hours, in advance of need  - Initiate/Maintain alarm  - Obtain necessary fall risk management equipment:  - Apply yellow socks and bracelet for high fall risk patients  - Consider moving patient to room near nurses station  Outcome: Progressing  Goal: Maintain or return to baseline ADL function  Description: INTERVENTIONS:  -  Assess patient's ability to carry out ADLs; assess patient's baseline for ADL function and identify physical deficits which impact ability to perform ADLs (bathing, care of mouth/teeth, toileting, grooming, dressing, etc )  - Assess/evaluate cause of self-care deficits   - Assess range of motion  - Assess patient's mobility; develop plan if impaired  - Assess patient's need for assistive devices and provide as appropriate  - Encourage maximum independence but intervene and supervise when necessary  - Involve family in performance of ADLs  - Assess for home care needs following discharge   - Consider OT consult to assist with ADL evaluation and planning for discharge  - Provide patient education as appropriate  Outcome: Progressing  Goal: Maintains/Returns to pre admission functional level  Description: INTERVENTIONS:  - Perform BMAT or MOVE assessment daily    - Set and communicate daily mobility goal to care team and patient/family/caregiver  - Collaborate with rehabilitation services on mobility goals if consulted  - Perform Range of Motion  times a day  - Reposition patient every  hours  - Dangle patient  times a day  - Stand patient  times a day  - Ambulate patient  times a day  - Out of bed to chair  times a day   - Out of bed for meals times a day  - Out of bed for toileting  - Record patient progress and toleration of activity level   Outcome: Progressing     Problem: DISCHARGE PLANNING  Goal: Discharge to home or other facility with appropriate resources  Description: INTERVENTIONS:  - Identify barriers to discharge w/patient and caregiver  - Arrange for needed discharge resources and transportation as appropriate  - Identify discharge learning needs (meds, wound care, etc )  - Arrange for interpretive services to assist at discharge as needed  - Refer to Case Management Department for coordinating discharge planning if the patient needs post-hospital services based on physician/advanced practitioner order or complex needs related to functional status, cognitive ability, or social support system  Outcome: Progressing     Problem: Knowledge Deficit  Goal: Patient/family/caregiver demonstrates understanding of disease process, treatment plan, medications, and discharge instructions  Description: Complete learning assessment and assess knowledge base    Interventions:  - Provide teaching at level of understanding  - Provide teaching via preferred learning methods  Outcome: Progressing

## 2023-03-26 NOTE — ASSESSMENT & PLAN NOTE
· Follows with [facility] oncology  · On daratumumab, Velcade and dexamethasone-> hold d/t sepsis  · Patient reports he has been getting chemotherapy monthly, most recently end of February States he would be due for treatment 3/30

## 2023-03-26 NOTE — ASSESSMENT & PLAN NOTE
Lab Results   Component Value Date    HGBA1C 8 9 (H) 02/01/2023       Recent Labs     03/25/23  1621 03/25/23 2039 03/26/23  0708 03/26/23  1120   POCGLU 191* 237* 126 234*       Blood Sugar Average: Last 72 hrs:     · Hemoglobin A1c shows poor control at 8 9  · However during hospitalization sugars have been well controlled on SSI plus Accu-Chek  · Diabetic diet

## 2023-03-26 NOTE — ASSESSMENT & PLAN NOTE
Wt Readings from Last 3 Encounters:   03/26/23 73 4 kg (161 lb 13 1 oz)   03/13/23 73 2 kg (161 lb 6 4 oz)   03/01/23 73 5 kg (162 lb)     · Patient appeared hypovolemic on presentation but now appears euvolemic  · Home Lasix 20 mg twice daily held on admission  · Will be NPO after midnight for bronch - continue to hold for now  ·   · Echo done on 5/2/2022 showing EF of 06% systolic function low normal grade 2 diastolic dysfunction aortic valve biprosthetic valve  · Low-sodium diet

## 2023-03-26 NOTE — ASSESSMENT & PLAN NOTE
· Presenting with worsening SOB with cold-like symptoms for the past 3 weeks    Completed a course of doxycycline  · Chest x-ray showing pneumonia  · CTA PE showing: No PE, severe multifocal right pneumonia, moderate interstitial edema, stable small loculated left and trace right pleural effusion  · Troponins negative  · Severe sepsis criteria with heart rate 97, respiratory rate 20 leukocytosis of 17 94, lactic acidosis in setting of pneumonia  · Urine antigens negative  · Blood cultures negative x2 after 48 hours  · MRSA swab negative -discontinue vancomycin  · Sputum cx pending  · Mildly elevated procalcitonin, downtrending  · Plan for bronchoscopy on Monday with pulmonology  · IGg 1,2,3,4, TANYA, ANCA, antimyeloperoxidase, antiproteinase, CCP, glomerular basement membrane antibody, RF Aspergillus antibodies and Aspergillus galactomannan antigen pending  · COVID negative  · Continue IV cefepime

## 2023-03-27 ENCOUNTER — TELEPHONE (OUTPATIENT)
Dept: PAIN MEDICINE | Facility: CLINIC | Age: 78
End: 2023-03-27

## 2023-03-27 ENCOUNTER — APPOINTMENT (INPATIENT)
Dept: GASTROENTEROLOGY | Facility: HOSPITAL | Age: 78
End: 2023-03-27
Attending: INTERNAL MEDICINE

## 2023-03-27 ENCOUNTER — ANESTHESIA EVENT (INPATIENT)
Dept: GASTROENTEROLOGY | Facility: HOSPITAL | Age: 78
End: 2023-03-27

## 2023-03-27 ENCOUNTER — ANESTHESIA (INPATIENT)
Dept: GASTROENTEROLOGY | Facility: HOSPITAL | Age: 78
End: 2023-03-27

## 2023-03-27 LAB
ANION GAP SERPL CALCULATED.3IONS-SCNC: 9 MMOL/L (ref 4–13)
APTT PPP: 30 SECONDS (ref 23–37)
BASOPHILS # BLD AUTO: 0.11 THOUSANDS/ÂΜL (ref 0–0.1)
BASOPHILS NFR BLD AUTO: 1 % (ref 0–1)
BUN SERPL-MCNC: 9 MG/DL (ref 5–25)
CALCIUM SERPL-MCNC: 9.2 MG/DL (ref 8.4–10.2)
CHLORIDE SERPL-SCNC: 102 MMOL/L (ref 96–108)
CO2 SERPL-SCNC: 27 MMOL/L (ref 21–32)
CREAT SERPL-MCNC: 0.73 MG/DL (ref 0.6–1.3)
EOSINOPHIL # BLD AUTO: 0.84 THOUSAND/ÂΜL (ref 0–0.61)
EOSINOPHIL NFR BLD AUTO: 11 % (ref 0–6)
EOSINOPHIL NFR FLD MANUAL: 3 %
ERYTHROCYTE [DISTWIDTH] IN BLOOD BY AUTOMATED COUNT: 14.9 % (ref 11.6–15.1)
GFR SERPL CREATININE-BSD FRML MDRD: 90 ML/MIN/1.73SQ M
GLUCOSE SERPL-MCNC: 122 MG/DL (ref 65–140)
GLUCOSE SERPL-MCNC: 133 MG/DL (ref 65–140)
GLUCOSE SERPL-MCNC: 143 MG/DL (ref 65–140)
GLUCOSE SERPL-MCNC: 159 MG/DL (ref 65–140)
GLUCOSE SERPL-MCNC: 223 MG/DL (ref 65–140)
HCT VFR BLD AUTO: 35.7 % (ref 36.5–49.3)
HGB BLD-MCNC: 11.7 G/DL (ref 12–17)
IMM GRANULOCYTES # BLD AUTO: 0.04 THOUSAND/UL (ref 0–0.2)
IMM GRANULOCYTES NFR BLD AUTO: 1 % (ref 0–2)
INR PPP: 0.94 (ref 0.84–1.19)
LYMPHOCYTES # BLD AUTO: 1.28 THOUSANDS/ÂΜL (ref 0.6–4.47)
LYMPHOCYTES NFR BLD AUTO: 16 % (ref 14–44)
LYMPHOCYTES NFR BLD AUTO: 7 %
MACROPHAGES NFR FLD: 81 %
MCH RBC QN AUTO: 28.6 PG (ref 26.8–34.3)
MCHC RBC AUTO-ENTMCNC: 32.8 G/DL (ref 31.4–37.4)
MCV RBC AUTO: 87 FL (ref 82–98)
MONOCYTES # BLD AUTO: 0.63 THOUSAND/ÂΜL (ref 0.17–1.22)
MONOCYTES NFR BLD AUTO: 8 % (ref 4–12)
NEUTROPHILS # BLD AUTO: 4.96 THOUSANDS/ÂΜL (ref 1.85–7.62)
NEUTS SEG NFR BLD AUTO: 6 %
NEUTS SEG NFR BLD AUTO: 63 % (ref 43–75)
NRBC BLD AUTO-RTO: 0 /100 WBCS
PLATELET # BLD AUTO: 327 THOUSANDS/UL (ref 149–390)
PMV BLD AUTO: 9.5 FL (ref 8.9–12.7)
POTASSIUM SERPL-SCNC: 4 MMOL/L (ref 3.5–5.3)
PROTHROMBIN TIME: 13.2 SECONDS (ref 11.6–14.5)
RBC # BLD AUTO: 4.09 MILLION/UL (ref 3.88–5.62)
RHEUMATOID FACT SER QL LA: NEGATIVE
SODIUM SERPL-SCNC: 138 MMOL/L (ref 135–147)
TOTAL CELLS COUNTED SPEC: 100
UNIDENT CELLS # BLD: 3 % (ref 0–0)
WBC # BLD AUTO: 7.86 THOUSAND/UL (ref 4.31–10.16)

## 2023-03-27 PROCEDURE — 0B9C8ZX DRAINAGE OF RIGHT UPPER LUNG LOBE, VIA NATURAL OR ARTIFICIAL OPENING ENDOSCOPIC, DIAGNOSTIC: ICD-10-PCS | Performed by: INTERNAL MEDICINE

## 2023-03-27 RX ORDER — SODIUM CHLORIDE, SODIUM LACTATE, POTASSIUM CHLORIDE, CALCIUM CHLORIDE 600; 310; 30; 20 MG/100ML; MG/100ML; MG/100ML; MG/100ML
50 INJECTION, SOLUTION INTRAVENOUS CONTINUOUS
Status: DISCONTINUED | OUTPATIENT
Start: 2023-03-27 | End: 2023-03-29 | Stop reason: HOSPADM

## 2023-03-27 RX ORDER — PROPOFOL 10 MG/ML
INJECTION, EMULSION INTRAVENOUS AS NEEDED
Status: DISCONTINUED | OUTPATIENT
Start: 2023-03-27 | End: 2023-03-27

## 2023-03-27 RX ORDER — LIDOCAINE HYDROCHLORIDE 10 MG/ML
INJECTION, SOLUTION EPIDURAL; INFILTRATION; INTRACAUDAL; PERINEURAL AS NEEDED
Status: DISCONTINUED | OUTPATIENT
Start: 2023-03-27 | End: 2023-03-27

## 2023-03-27 RX ORDER — FUROSEMIDE 20 MG/1
20 TABLET ORAL
Status: DISCONTINUED | OUTPATIENT
Start: 2023-03-28 | End: 2023-03-29 | Stop reason: HOSPADM

## 2023-03-27 RX ORDER — SODIUM CHLORIDE, SODIUM LACTATE, POTASSIUM CHLORIDE, CALCIUM CHLORIDE 600; 310; 30; 20 MG/100ML; MG/100ML; MG/100ML; MG/100ML
INJECTION, SOLUTION INTRAVENOUS CONTINUOUS PRN
Status: DISCONTINUED | OUTPATIENT
Start: 2023-03-27 | End: 2023-03-27

## 2023-03-27 RX ORDER — ONDANSETRON 2 MG/ML
4 INJECTION INTRAMUSCULAR; INTRAVENOUS ONCE AS NEEDED
Status: DISCONTINUED | OUTPATIENT
Start: 2023-03-27 | End: 2023-03-29 | Stop reason: HOSPADM

## 2023-03-27 RX ADMIN — LEVALBUTEROL HYDROCHLORIDE 1.25 MG: 1.25 SOLUTION, CONCENTRATE RESPIRATORY (INHALATION) at 13:35

## 2023-03-27 RX ADMIN — PROPOFOL 50 MG: 10 INJECTION, EMULSION INTRAVENOUS at 11:15

## 2023-03-27 RX ADMIN — PANTOPRAZOLE SODIUM 40 MG: 40 TABLET, DELAYED RELEASE ORAL at 05:12

## 2023-03-27 RX ADMIN — VALACYCLOVIR HYDROCHLORIDE 500 MG: 500 TABLET, FILM COATED ORAL at 08:38

## 2023-03-27 RX ADMIN — SODIUM CHLORIDE SOLN NEBU 3% 4 ML: 3 NEBU SOLN at 13:35

## 2023-03-27 RX ADMIN — ATORVASTATIN CALCIUM 40 MG: 40 TABLET, FILM COATED ORAL at 17:53

## 2023-03-27 RX ADMIN — INSULIN LISPRO 2 UNITS: 100 INJECTION, SOLUTION INTRAVENOUS; SUBCUTANEOUS at 17:54

## 2023-03-27 RX ADMIN — PROPOFOL 50 MG: 10 INJECTION, EMULSION INTRAVENOUS at 11:11

## 2023-03-27 RX ADMIN — CEFEPIME HYDROCHLORIDE 2000 MG: 2 INJECTION, SOLUTION INTRAVENOUS at 08:38

## 2023-03-27 RX ADMIN — CEFEPIME HYDROCHLORIDE 2000 MG: 2 INJECTION, SOLUTION INTRAVENOUS at 00:10

## 2023-03-27 RX ADMIN — LEVALBUTEROL HYDROCHLORIDE 1.25 MG: 1.25 SOLUTION, CONCENTRATE RESPIRATORY (INHALATION) at 07:14

## 2023-03-27 RX ADMIN — PROPOFOL 50 MG: 10 INJECTION, EMULSION INTRAVENOUS at 11:13

## 2023-03-27 RX ADMIN — LEVALBUTEROL HYDROCHLORIDE 1.25 MG: 1.25 SOLUTION, CONCENTRATE RESPIRATORY (INHALATION) at 20:44

## 2023-03-27 RX ADMIN — SODIUM CHLORIDE SOLN NEBU 3% 4 ML: 3 NEBU SOLN at 20:44

## 2023-03-27 RX ADMIN — LIDOCAINE HYDROCHLORIDE 50 MG: 10 INJECTION, SOLUTION EPIDURAL; INFILTRATION; INTRACAUDAL; PERINEURAL at 11:11

## 2023-03-27 RX ADMIN — CEFEPIME HYDROCHLORIDE 2000 MG: 2 INJECTION, SOLUTION INTRAVENOUS at 17:53

## 2023-03-27 RX ADMIN — METOPROLOL SUCCINATE 100 MG: 50 TABLET, EXTENDED RELEASE ORAL at 08:38

## 2023-03-27 RX ADMIN — GUAIFENESIN 600 MG: 600 TABLET ORAL at 08:38

## 2023-03-27 RX ADMIN — GUAIFENESIN 600 MG: 600 TABLET ORAL at 21:08

## 2023-03-27 RX ADMIN — INSULIN LISPRO 1 UNITS: 100 INJECTION, SOLUTION INTRAVENOUS; SUBCUTANEOUS at 22:13

## 2023-03-27 RX ADMIN — SODIUM CHLORIDE SOLN NEBU 3% 4 ML: 3 NEBU SOLN at 07:14

## 2023-03-27 RX ADMIN — SODIUM CHLORIDE, SODIUM LACTATE, POTASSIUM CHLORIDE, AND CALCIUM CHLORIDE: .6; .31; .03; .02 INJECTION, SOLUTION INTRAVENOUS at 10:48

## 2023-03-27 NOTE — ASSESSMENT & PLAN NOTE
· Blood pressure stable  · Continue metoprolol   · Lasix held on admission  Appears euvolemic  Plan for bronch today, has been NPO after midnight    Will resume 3/28

## 2023-03-27 NOTE — PROGRESS NOTES
[place]  Progress Note  Name: Raghavendra Newsome  MRN: [id number]  Unit/Bed#: -01 I Date of Admission: 3/23/2023   Date of Service: 3/27/2023 I Hospital Day: 4    Assessment/Plan   * Severe sepsis [facility])  Assessment & Plan  · Presenting with worsening SOB with cold-like symptoms for the past 3 weeks  Completed a course of doxycycline  · Chest x-ray showing pneumonia  · CTA PE showing: No PE, severe multifocal right pneumonia, moderate interstitial edema, stable small loculated left and trace right pleural effusion  · Troponins negative  · Severe sepsis criteria with heart rate 97, respiratory rate 20 leukocytosis of 17 94, lactic acidosis in setting of pneumonia  · Urine antigens negative  · Blood cultures negative x2 after 72 hours  · MRSA swab negative -discontinued vancomycin  · Sputum cx not collected  Patient does not report productive cough at this time  · Mildly elevated procalcitonin, downtrending  · Plan for bronchoscopy today with pulmonology  · COVID negative  · Continue IV cefepime    Acute respiratory insufficiency  Assessment & Plan  · Initially requiring 4L NC-> has since been weaned to room air  · Resolved  · See mgx of sepsis    Pneumonia of right lower lobe due to infectious organism  Assessment & Plan  · Initially started on IV cefepime/vancomycin  · MRSA swab negative -discontinue vancomycin  · Initial leukocytosis now resolved    Remains afebrile  · Blood cultures negative x2 after 72 hours  · Trend WBC and fever curve  · Plan for bronchoscopy later this morning    Paroxysmal atrial fibrillation (HCC)  Assessment & Plan  · Rate controlled on metoprolol  · Not on anticoagulation as outpatient    Chronic diastolic congestive heart failure (HCC)  Assessment & Plan  Wt Readings from Last 3 Encounters:   03/27/23 73 kg (160 lb 15 oz)   03/13/23 73 2 kg (161 lb 6 4 oz)   03/01/23 73 5 kg (162 lb)     · Patient appeared hypovolemic on presentation but now appears euvolemic  · Home Lasix 20 mg twice daily held on admission  · Currently n p o  for bronchoscopy  Reordered to start tomorrow  ·   · Echo done on 5/2/2022 showing EF of 96% systolic function low normal grade 2 diastolic dysfunction aortic valve biprosthetic valve  · Low-sodium diet    Essential hypertension  Assessment & Plan  · Blood pressure stable  · Continue metoprolol   · Lasix held on admission  Appears euvolemic  Plan for bronch today, has been NPO after midnight  Will resume 3/28    Multiple myeloma not having achieved remission [facility])  Assessment & Plan  · Follows with [facility] oncology  · On daratumumab, Velcade and dexamethasone-> hold d/t sepsis  · Patient reports he has been getting chemotherapy monthly, most recently end of February  States he would be due for treatment 3/30    Dyslipidemia  Assessment & Plan  · Continue statin therapy    Type 2 diabetes mellitus without complication, without long-term current use of insulin [facility])  Assessment & Plan  Lab Results   Component Value Date    HGBA1C 8 9 (H) 02/01/2023       Recent Labs     03/26/23  1120 03/26/23  1639 03/26/23  2103 03/27/23  0720   POCGLU 234* 193* 186* 122       Blood Sugar Average: Last 72 hrs:     · Hemoglobin A1c shows poor control at 8 9  · However during hospitalization sugars have been well controlled on SSI plus Accu-Chek  · Diabetic diet         VTE Pharmacologic Prophylaxis: VTE Score: 7 High Risk (Score >/= 5) - Pharmacological DVT Prophylaxis Ordered: enoxaparin (Lovenox)  Sequential Compression Devices Ordered  Patient Centered Rounds: I performed bedside rounds with nursing staff today  Discussions with Specialists or Other Care Team Provider: JAIMIE    Education and Discussions with Family / Patient: Updated  (wife) at bedside      Total Time Spent on Date of Encounter in care of patient: 35 minutes This time was spent on one or more of the following: performing physical exam; counseling and coordination of care; obtaining or reviewing history; documenting in the medical record; reviewing/ordering tests, medications or procedures; communicating with other healthcare professionals and discussing with patient's family/caregivers  Current Length of Stay: 4 day(s)  Current Patient Status: Inpatient   Certification Statement: The patient will continue to require additional inpatient hospital stay due to Bronchoscopy  Discharge Plan: Anticipate discharge tomorrow to home  Code Status: Level 1 - Full Code    Subjective:   Patient overall feels well  Hoping to go home soon  Denies chest pain/palpitations, shortness of breath, nausea/vomiting, abdominal pain, fever/chills  Denies constipation or diarrhea  Objective:     Vitals:   Temp (24hrs), Av °F (36 7 °C), Min:97 8 °F (36 6 °C), Max:98 2 °F (36 8 °C)    Temp:  [97 8 °F (36 6 °C)-98 2 °F (36 8 °C)] 98 2 °F (36 8 °C)  HR:  [73-89] 89  Resp:  [16] 16  BP: (140-144)/(72-73) 140/72  SpO2:  [90 %-97 %] 90 %  Body mass index is 22 45 kg/m²  Input and Output Summary (last 24 hours): Intake/Output Summary (Last 24 hours) at 3/27/2023 1015  Last data filed at 3/27/2023 4495  Gross per 24 hour   Intake 240 ml   Output 950 ml   Net -710 ml       Physical Exam:   Physical Exam  Vitals and nursing note reviewed  Constitutional:       General: He is not in acute distress  Appearance: He is well-developed  Comments: No acute distress   HENT:      Head: Normocephalic and atraumatic  Eyes:      General: No scleral icterus  Extraocular Movements: Extraocular movements intact  Conjunctiva/sclera: Conjunctivae normal    Cardiovascular:      Rate and Rhythm: Normal rate and regular rhythm  Heart sounds: S1 normal and S2 normal  No murmur heard  Pulmonary:      Effort: Pulmonary effort is normal  No respiratory distress  Breath sounds: Normal breath sounds  No wheezing, rhonchi or rales     Abdominal:      General: Bowel sounds are normal       Palpations: Abdomen is soft  Tenderness: There is no abdominal tenderness  There is no guarding or rebound  Musculoskeletal:         General: No swelling  Cervical back: Normal range of motion  Comments: Able to move upper/lower extremities bilaterally, no edema   Skin:     General: Skin is warm and dry  Capillary Refill: Capillary refill takes less than 2 seconds  Neurological:      Mental Status: He is alert and oriented to person, place, and time  Psychiatric:         Mood and Affect: Mood normal          Speech: Speech normal          Behavior: Behavior normal           Additional Data:     Labs:  Results from last 7 days   Lab Units 03/27/23  0525   WBC Thousand/uL 7 86   HEMOGLOBIN g/dL 11 7*   HEMATOCRIT % 35 7*   PLATELETS Thousands/uL 327   NEUTROS PCT % 63   LYMPHS PCT % 16   MONOS PCT % 8   EOS PCT % 11*     Results from last 7 days   Lab Units 03/27/23  0525 03/25/23  0501 03/24/23  0433   SODIUM mmol/L 138   < > 137   POTASSIUM mmol/L 4 0   < > 3 5   CHLORIDE mmol/L 102   < > 103   CO2 mmol/L 27   < > 26   BUN mg/dL 9   < > 11   CREATININE mg/dL 0 73   < > 0 81   ANION GAP mmol/L 9   < > 8   CALCIUM mg/dL 9 2   < > 8 5   ALBUMIN g/dL  --   --  3 4*   TOTAL BILIRUBIN mg/dL  --   --  0 55   ALK PHOS U/L  --   --  70   ALT U/L  --   --  7   AST U/L  --   --  12*   GLUCOSE RANDOM mg/dL 143*   < > 116    < > = values in this interval not displayed       Results from last 7 days   Lab Units 03/27/23  0525   INR  0 94     Results from last 7 days   Lab Units 03/27/23  0720 03/26/23  2103 03/26/23  1639 03/26/23  1120 03/26/23  0708 03/25/23  2039 03/25/23  1621 03/25/23  1140 03/25/23  0710 03/24/23  2133 03/24/23  1711 03/24/23  1121   POC GLUCOSE mg/dl 122 186* 193* 234* 126 237* 191* 204* 132 233* 157* 158*         Results from last 7 days   Lab Units 03/25/23  0501 03/24/23  0433 03/23/23  0941 03/23/23  0739   LACTIC ACID mmol/L  --   --  1 4 2 3* PROCALCITONIN ng/ml 0 27* 0 46*  --  0 19       Lines/Drains:  Invasive Devices     Peripheral Intravenous Line  Duration           Peripheral IV 03/27/23 Distal;Left;Upper;Ventral (anterior) Arm <1 day                      Imaging: Reviewed radiology reports from this admission including: chest CT scan    Recent Cultures (last 7 days):   Results from last 7 days   Lab Units 03/23/23  1056 03/23/23  0739   BLOOD CULTURE   --  No Growth at 72 hrs  No Growth at 24 hrs     LEGIONELLA URINARY ANTIGEN  Negative  --        Last 24 Hours Medication List:   Current Facility-Administered Medications   Medication Dose Route Frequency Provider Last Rate   • acetaminophen  650 mg Oral Q6H PRN Janace Bernheim, MD     • aluminum-magnesium hydroxide-simethicone  30 mL Oral Q4H PRN Janace Bernheim, MD     • aspirin  81 mg Oral Daily Janace Bernheim, MD     • atorvastatin  40 mg Oral Daily With Franklyn Harvey MD     • benzonatate  100 mg Oral TID PRN Janace Bernheim, MD     • cefepime  2,000 mg Intravenous Q8H Janace Bernheim, MD 2,000 mg (03/27/23 1473)   • diphenhydrAMINE  25 mg Intravenous Q6H PRN Janace Bernheim, MD     • enoxaparin  40 mg Subcutaneous Daily Janace Bernheim, MD     • [START ON 3/28/2023] furosemide  20 mg Oral BID (diuretic) Kenny Wells PA-C     • guaiFENesin  600 mg Oral Q12H Angie Seaman MD     • insulin lispro  1-5 Units Subcutaneous TID AC Janace Bernheim, MD     • insulin lispro  1-5 Units Subcutaneous HS Janace Bernheim, MD     • levalbuterol  1 25 mg Nebulization TID Rabia Chao MD     • metoprolol succinate  100 mg Oral Daily Janace Bernheim, MD     • pantoprazole  40 mg Oral Early Morning Janace Bernheim, MD     • polyethylene glycol  17 g Oral Daily PRN Janace Bernheim, MD     • sodium chloride  4 mL Nebulization TID Rabia Chao MD     • trimethobenzamide  200 mg Intramuscular Q6H PRN Janace Bernheim, MD     • valACYclovir  500 mg Oral Daily Janace Bernheim, MD          Today, Patient Was Seen By: Gianfranco Keller PA-C    **Please Note: This note may have been constructed using a voice recognition system  **

## 2023-03-27 NOTE — ASSESSMENT & PLAN NOTE
· Initially started on IV cefepime/vancomycin  · MRSA swab negative -discontinue vancomycin  · Initial leukocytosis now resolved    Remains afebrile  · Blood cultures negative x2 after 72 hours  · Trend WBC and fever curve  · Plan for bronchoscopy later this morning

## 2023-03-27 NOTE — ASSESSMENT & PLAN NOTE
· Follows with [facility] oncology  · On daratumumab, Velcade and dexamethasone-> hold d/t sepsis  · Patient reports he has been getting chemotherapy monthly, most recently end of February States he would be due for treatment 3/30

## 2023-03-27 NOTE — ANESTHESIA PREPROCEDURE EVALUATION
Procedure:  BRONCHOSCOPY    Relevant Problems   ANESTHESIA (within normal limits)      CARDIO   (+) Chest pain   (+) Chronic diastolic congestive heart failure (HCC)   (+) Coronary artery disease involving native heart with angina pectoris, unspecified vessel or lesion type (HCC)   (+) Essential hypertension   (+) History of aortic valve replacement with bioprosthetic valve   (+) Hx of CABG   (+) Paroxysmal atrial fibrillation (HCC)      ENDO   (+) Type 2 diabetes mellitus without complication, without long-term current use of insulin (HCC)      GI/HEPATIC   (+) Gastroesophageal reflux disease with esophagitis   (+) Malignant tumor of cecum (HCC)   (+) Pharyngeal dysphagia      HEMATOLOGY   (+) Iron deficiency anemia   (+) Lymphoma (HCC)   (+) Multiple myeloma not having achieved remission (HCC)      MUSCULOSKELETAL   (+) Primary localized osteoarthritis of right knee      NEURO/PSYCH   (+) Personal history of colon cancer      PULMONARY   (+) Pneumonia of right lower lobe due to infectious organism      Respiratory   (+) Acute respiratory insufficiency      Other   (+) Dyslipidemia      Labs:   Results from last 7 days   Lab Units 03/27/23  0525 03/26/23  0456 03/25/23  0501 03/24/23  0433 03/23/23  0739   WBC Thousand/uL 7 86 8 09 9 87 15 01* 17 94*   HEMOGLOBIN g/dL 11 7* 10 3* 10 1* 11 7* 12 2   HEMATOCRIT % 35 7* 32 1* 31 0* 35 5* 37 4   PLATELETS Thousands/uL 327 293 270 276 308   NEUTROS PCT % 63 62 65  --  78*   MONOS PCT % 8 9 9  --  8     Results from last 7 days   Lab Units 03/27/23  0525 03/26/23  0456 03/25/23  0501 03/24/23  0433 03/23/23  0739   SODIUM mmol/L 138 137 137 137 139   POTASSIUM mmol/L 4 0 4 0 3 7 3 5 3 0*   CHLORIDE mmol/L 102 103 104 103 99   CO2 mmol/L 27 26 26 26 28   ANION GAP mmol/L 9 8 7 8 12   BUN mg/dL 9 10 10 11 17   CREATININE mg/dL 0 73 0 75 0 69 0 81 0 91   EGFR ml/min/1 73sq m 90 89 92 86 81   CALCIUM mg/dL 9 2 8 5 8 4 8 5 9 0   GLUCOSE RANDOM mg/dL 143* 140 131 116 186* ALT U/L  --   --   --  7 9   AST U/L  --   --   --  12* 13   ALK PHOS U/L  --   --   --  70 88   ALBUMIN g/dL  --   --   --  3 4* 3 8   TOTAL BILIRUBIN mg/dL  --   --   --  0 55 0 50     Results from last 7 days   Lab Units 03/25/23  0501 03/24/23  0433   MAGNESIUM mg/dL 2 2 1 7*      Results from last 7 days   Lab Units 03/27/23  0525 03/23/23  0739   INR  0 94 0 98   PTT seconds 30 27          Results from last 7 days   Lab Units 03/23/23  0941 03/23/23  0739   LACTIC ACID mmol/L 1 4 2 3*     ABG:    VBG:    Results from last 7 days   Lab Units 03/25/23  0501 03/24/23  0433 03/23/23  0739   PROCALCITONIN ng/ml 0 27* 0 46* 0 19     Interpretation Summary   Study date: 5/2/22            •  Left Ventricle: Left ventricular cavity size is normal  The left ventricular ejection fraction is 55%  Systolic function is low normal  Wall motion is normal  Diastolic function is moderately abnormal, consistent with grade II (pseudonormal) relaxation  Wall thickness is mildly increased  •  Right Ventricle: Systolic function is mildly reduced  •  Left Atrium: The atrium is mildly dilated  •  Right Atrium: The atrium is mildly dilated  •  Aortic Valve: There is a bioprosthetic valve  The prosthetic valve appears to be functioning normally  •  Mitral Valve: There is mild regurgitation  •  Tricuspid Valve: There is mild regurgitation  •  Pulmonic Valve: There is mild regurgitation  Physical Exam    Airway    Mallampati score: I  TM Distance: >3 FB  Neck ROM: full     Dental   upper dentures and lower dentures,     Cardiovascular  Cardiovascular exam normal    Pulmonary  Pulmonary exam normal     Other Findings        Anesthesia Plan  ASA Score- 4     Anesthesia Type- IV sedation with anesthesia with ASA Monitors           Additional Monitors:   Airway Plan:     Comment: I discussed risks (reviewed with patient on the anesthesia consent form), benefits and alternatives of monitored sedation including the possibility under sedation to have recall or mild discomfort          Plan Factors-    Chart reviewed  EKG reviewed  Existing labs reviewed  Patient summary reviewed  Induction- intravenous  Postoperative Plan-     Informed Consent- Anesthetic plan and risks discussed with patient  I personally reviewed this patient with the CRNA  Discussed and agreed on the Anesthesia Plan with the CRNA  Roshan Tinsley

## 2023-03-27 NOTE — ANESTHESIA POSTPROCEDURE EVALUATION
Post-Op Assessment Note    CV Status:  Stable  Pain Score: 0    Pain management: adequate     Mental Status:  Sleepy   Hydration Status:  Euvolemic   PONV Controlled:  Controlled   Airway Patency:  Patent      Post Op Vitals Reviewed: Yes      Staff: CRNA         No notable events documented      BP   110/58   Temp      Pulse  71   Resp      SpO2   99 on 4L

## 2023-03-27 NOTE — PLAN OF CARE
Problem: Potential for Falls  Goal: Patient will remain free of falls  Description: INTERVENTIONS:  - Educate patient/family on patient safety including physical limitations  - Instruct patient to call for assistance with activity   - Consult OT/PT to assist with strengthening/mobility   - Keep Call bell within reach  - Keep bed low and locked with side rails adjusted as appropriate  - Keep care items and personal belongings within reach  - Initiate and maintain comfort rounds  - Make Fall Risk Sign visible to staff  - Offer Toileting every  Hours, in advance of need  - Initiate/Maintain alarm  - Obtain necessary fall risk management equipment:  - Apply yellow socks and bracelet for high fall risk patients  - Consider moving patient to room near nurses station  Outcome: Progressing     Problem: PAIN - ADULT  Goal: Verbalizes/displays adequate comfort level or baseline comfort level  Description: Interventions:  - Encourage patient to monitor pain and request assistance  - Assess pain using appropriate pain scale  - Administer analgesics based on type and severity of pain and evaluate response  - Implement non-pharmacological measures as appropriate and evaluate response  - Consider cultural and social influences on pain and pain management  - Notify physician/advanced practitioner if interventions unsuccessful or patient reports new pain  Outcome: Progressing     Problem: SAFETY ADULT  Goal: Patient will remain free of falls  Description: INTERVENTIONS:  - Educate patient/family on patient safety including physical limitations  - Instruct patient to call for assistance with activity   - Consult OT/PT to assist with strengthening/mobility   - Keep Call bell within reach  - Keep bed low and locked with side rails adjusted as appropriate  - Keep care items and personal belongings within reach  - Initiate and maintain comfort rounds  - Make Fall Risk Sign visible to staff  - Offer Toileting every  Hours, in advance of need  - Initiate/Maintain alarm  - Obtain necessary fall risk management equipment:  - Apply yellow socks and bracelet for high fall risk patients  - Consider moving patient to room near nurses station  Outcome: Progressing

## 2023-03-27 NOTE — PLAN OF CARE
Problem: Potential for Falls  Goal: Patient will remain free of falls  Description: INTERVENTIONS:  - Educate patient/family on patient safety including physical limitations  - Instruct patient to call for assistance with activity   - Consult OT/PT to assist with strengthening/mobility   - Keep Call bell within reach  - Keep bed low and locked with side rails adjusted as appropriate  - Keep care items and personal belongings within reach  - Initiate and maintain comfort rounds  - Make Fall Risk Sign visible to staff  - Offer Toileting every  Hours, in advance of need  - Initiate/Maintain alarm  - Obtain necessary fall risk management equipment:  - Apply yellow socks and bracelet for high fall risk patients  - Consider moving patient to room near nurses station  Outcome: Progressing     Problem: PAIN - ADULT  Goal: Verbalizes/displays adequate comfort level or baseline comfort level  Description: Interventions:  - Encourage patient to monitor pain and request assistance  - Assess pain using appropriate pain scale  - Administer analgesics based on type and severity of pain and evaluate response  - Implement non-pharmacological measures as appropriate and evaluate response  - Consider cultural and social influences on pain and pain management  - Notify physician/advanced practitioner if interventions unsuccessful or patient reports new pain  Outcome: Progressing     Problem: INFECTION - ADULT  Goal: Absence or prevention of progression during hospitalization  Description: INTERVENTIONS:  - Assess and monitor for signs and symptoms of infection  - Monitor lab/diagnostic results  - Monitor all insertion sites, i e  indwelling lines, tubes, and drains  - Monitor endotracheal if appropriate and nasal secretions for changes in amount and color  - [place] appropriate cooling/warming therapies per order  - Administer medications as ordered  - Instruct and encourage patient and family to use good hand hygiene technique  - Identify and instruct in appropriate isolation precautions for identified infection/condition  Outcome: Progressing  Goal: Absence of fever/infection during neutropenic period  Description: INTERVENTIONS:  - Monitor WBC    Outcome: Progressing     Problem: SAFETY ADULT  Goal: Patient will remain free of falls  Description: INTERVENTIONS:  - Educate patient/family on patient safety including physical limitations  - Instruct patient to call for assistance with activity   - Consult OT/PT to assist with strengthening/mobility   - Keep Call bell within reach  - Keep bed low and locked with side rails adjusted as appropriate  - Keep care items and personal belongings within reach  - Initiate and maintain comfort rounds  - Make Fall Risk Sign visible to staff  - Offer Toileting every  Hours, in advance of need  - Initiate/Maintain alarm  - Obtain necessary fall risk management equipment:  - Apply yellow socks and bracelet for high fall risk patients  - Consider moving patient to room near nurses station  Outcome: Progressing  Goal: Maintain or return to baseline ADL function  Description: INTERVENTIONS:  -  Assess patient's ability to carry out ADLs; assess patient's baseline for ADL function and identify physical deficits which impact ability to perform ADLs (bathing, care of mouth/teeth, toileting, grooming, dressing, etc )  - Assess/evaluate cause of self-care deficits   - Assess range of motion  - Assess patient's mobility; develop plan if impaired  - Assess patient's need for assistive devices and provide as appropriate  - Encourage maximum independence but intervene and supervise when necessary  - Involve family in performance of ADLs  - Assess for home care needs following discharge   - Consider OT consult to assist with ADL evaluation and planning for discharge  - Provide patient education as appropriate  Outcome: Progressing  Goal: Maintains/Returns to pre admission functional level  Description: INTERVENTIONS:  - Perform BMAT or MOVE assessment daily    - Set and communicate daily mobility goal to care team and patient/family/caregiver  - Collaborate with rehabilitation services on mobility goals if consulted  - Perform Range of Motion  times a day  - Reposition patient every  hours  - Dangle patient  times a day  - Stand patient  times a day  - Ambulate patient  times a day  - Out of bed to chair  times a day   - Out of bed for meals times a day  - Out of bed for toileting  - Record patient progress and toleration of activity level   Outcome: Progressing     Problem: DISCHARGE PLANNING  Goal: Discharge to home or other facility with appropriate resources  Description: INTERVENTIONS:  - Identify barriers to discharge w/patient and caregiver  - Arrange for needed discharge resources and transportation as appropriate  - Identify discharge learning needs (meds, wound care, etc )  - Arrange for interpretive services to assist at discharge as needed  - Refer to Case Management Department for coordinating discharge planning if the patient needs post-hospital services based on physician/advanced practitioner order or complex needs related to functional status, cognitive ability, or social support system  Outcome: Progressing     Problem: Knowledge Deficit  Goal: Patient/family/caregiver demonstrates understanding of disease process, treatment plan, medications, and discharge instructions  Description: Complete learning assessment and assess knowledge base    Interventions:  - Provide teaching at level of understanding  - Provide teaching via preferred learning methods  Outcome: Progressing

## 2023-03-27 NOTE — ASSESSMENT & PLAN NOTE
Wt Readings from Last 3 Encounters:   03/27/23 73 kg (160 lb 15 oz)   03/13/23 73 2 kg (161 lb 6 4 oz)   03/01/23 73 5 kg (162 lb)     · Patient appeared hypovolemic on presentation but now appears euvolemic  · Home Lasix 20 mg twice daily held on admission  · Currently n p o  for bronchoscopy    Reordered to start tomorrow  ·   · Echo done on 5/2/2022 showing EF of 83% systolic function low normal grade 2 diastolic dysfunction aortic valve biprosthetic valve  · Low-sodium diet

## 2023-03-27 NOTE — ASSESSMENT & PLAN NOTE
· Presenting with worsening SOB with cold-like symptoms for the past 3 weeks  Completed a course of doxycycline  · Chest x-ray showing pneumonia  · CTA PE showing: No PE, severe multifocal right pneumonia, moderate interstitial edema, stable small loculated left and trace right pleural effusion  · Troponins negative  · Severe sepsis criteria with heart rate 97, respiratory rate 20 leukocytosis of 17 94, lactic acidosis in setting of pneumonia  · Urine antigens negative  · Blood cultures negative x2 after 72 hours  · MRSA swab negative -discontinued vancomycin  · Sputum cx not collected    Patient does not report productive cough at this time  · Mildly elevated procalcitonin, downtrending  · Plan for bronchoscopy today with pulmonology  · COVID negative  · Continue IV cefepime

## 2023-03-27 NOTE — ASSESSMENT & PLAN NOTE
Lab Results   Component Value Date    HGBA1C 8 9 (H) 02/01/2023       Recent Labs     03/26/23  1120 03/26/23  1639 03/26/23  2103 03/27/23  0720   POCGLU 234* 193* 186* 122       Blood Sugar Average: Last 72 hrs:     · Hemoglobin A1c shows poor control at 8 9  · However during hospitalization sugars have been well controlled on SSI plus Accu-Chek  · Diabetic diet

## 2023-03-28 LAB
ANION GAP SERPL CALCULATED.3IONS-SCNC: 8 MMOL/L (ref 4–13)
BACTERIA BLD CULT: NORMAL
BACTERIA BLD CULT: NORMAL
BASOPHILS # BLD AUTO: 0.09 THOUSANDS/ÂΜL (ref 0–0.1)
BASOPHILS NFR BLD AUTO: 1 % (ref 0–1)
BUN SERPL-MCNC: 9 MG/DL (ref 5–25)
C-ANCA TITR SER IF: NORMAL TITER
CALCIUM SERPL-MCNC: 8.6 MG/DL (ref 8.4–10.2)
CCP AB SER IA-ACNC: 0.7
CHLORIDE SERPL-SCNC: 103 MMOL/L (ref 96–108)
CO2 SERPL-SCNC: 26 MMOL/L (ref 21–32)
CREAT SERPL-MCNC: 0.69 MG/DL (ref 0.6–1.3)
EOSINOPHIL # BLD AUTO: 0.81 THOUSAND/ÂΜL (ref 0–0.61)
EOSINOPHIL NFR BLD AUTO: 11 % (ref 0–6)
ERYTHROCYTE [DISTWIDTH] IN BLOOD BY AUTOMATED COUNT: 14.9 % (ref 11.6–15.1)
GFR SERPL CREATININE-BSD FRML MDRD: 92 ML/MIN/1.73SQ M
GLUCOSE SERPL-MCNC: 116 MG/DL (ref 65–140)
GLUCOSE SERPL-MCNC: 126 MG/DL (ref 65–140)
GLUCOSE SERPL-MCNC: 179 MG/DL (ref 65–140)
GLUCOSE SERPL-MCNC: 206 MG/DL (ref 65–140)
GLUCOSE SERPL-MCNC: 213 MG/DL (ref 65–140)
GLUCOSE SERPL-MCNC: <20 MG/DL (ref 65–140)
HCT VFR BLD AUTO: 34.3 % (ref 36.5–49.3)
HGB BLD-MCNC: 11.2 G/DL (ref 12–17)
IMM GRANULOCYTES # BLD AUTO: 0.06 THOUSAND/UL (ref 0–0.2)
IMM GRANULOCYTES NFR BLD AUTO: 1 % (ref 0–2)
LYMPHOCYTES # BLD AUTO: 1.3 THOUSANDS/ÂΜL (ref 0.6–4.47)
LYMPHOCYTES NFR BLD AUTO: 18 % (ref 14–44)
MCH RBC QN AUTO: 28.6 PG (ref 26.8–34.3)
MCHC RBC AUTO-ENTMCNC: 32.7 G/DL (ref 31.4–37.4)
MCV RBC AUTO: 88 FL (ref 82–98)
MONOCYTES # BLD AUTO: 0.8 THOUSAND/ÂΜL (ref 0.17–1.22)
MONOCYTES NFR BLD AUTO: 11 % (ref 4–12)
MYELOPEROXIDASE AB SER IA-ACNC: <0.2 UNITS (ref 0–0.9)
MYELOPEROXIDASE AB SER IA-ACNC: <0.2 UNITS (ref 0–0.9)
NEUTROPHILS # BLD AUTO: 4.18 THOUSANDS/ÂΜL (ref 1.85–7.62)
NEUTS SEG NFR BLD AUTO: 58 % (ref 43–75)
NRBC BLD AUTO-RTO: 0 /100 WBCS
P-ANCA ATYPICAL TITR SER IF: NORMAL TITER
P-ANCA TITR SER IF: NORMAL TITER
PLATELET # BLD AUTO: 294 THOUSANDS/UL (ref 149–390)
PMV BLD AUTO: 9.6 FL (ref 8.9–12.7)
POTASSIUM SERPL-SCNC: 3.7 MMOL/L (ref 3.5–5.3)
PROTEINASE3 AB SER IA-ACNC: <0.2 UNITS (ref 0–0.9)
RBC # BLD AUTO: 3.92 MILLION/UL (ref 3.88–5.62)
RHODAMINE-AURAMINE STN SPEC: NORMAL
SODIUM SERPL-SCNC: 137 MMOL/L (ref 135–147)
WBC # BLD AUTO: 7.24 THOUSAND/UL (ref 4.31–10.16)

## 2023-03-28 RX ADMIN — ENOXAPARIN SODIUM 40 MG: 100 INJECTION SUBCUTANEOUS at 08:43

## 2023-03-28 RX ADMIN — ATORVASTATIN CALCIUM 40 MG: 40 TABLET, FILM COATED ORAL at 16:03

## 2023-03-28 RX ADMIN — INSULIN LISPRO 2 UNITS: 100 INJECTION, SOLUTION INTRAVENOUS; SUBCUTANEOUS at 22:43

## 2023-03-28 RX ADMIN — FUROSEMIDE 20 MG: 20 TABLET ORAL at 16:03

## 2023-03-28 RX ADMIN — SODIUM CHLORIDE SOLN NEBU 3% 4 ML: 3 NEBU SOLN at 13:43

## 2023-03-28 RX ADMIN — LEVALBUTEROL HYDROCHLORIDE 1.25 MG: 1.25 SOLUTION, CONCENTRATE RESPIRATORY (INHALATION) at 19:56

## 2023-03-28 RX ADMIN — SODIUM CHLORIDE SOLN NEBU 3% 4 ML: 3 NEBU SOLN at 19:56

## 2023-03-28 RX ADMIN — FUROSEMIDE 20 MG: 20 TABLET ORAL at 08:43

## 2023-03-28 RX ADMIN — CEFEPIME HYDROCHLORIDE 2000 MG: 2 INJECTION, SOLUTION INTRAVENOUS at 16:04

## 2023-03-28 RX ADMIN — PANTOPRAZOLE SODIUM 40 MG: 40 TABLET, DELAYED RELEASE ORAL at 05:13

## 2023-03-28 RX ADMIN — LEVALBUTEROL HYDROCHLORIDE 1.25 MG: 1.25 SOLUTION, CONCENTRATE RESPIRATORY (INHALATION) at 07:21

## 2023-03-28 RX ADMIN — INSULIN LISPRO 1 UNITS: 100 INJECTION, SOLUTION INTRAVENOUS; SUBCUTANEOUS at 17:39

## 2023-03-28 RX ADMIN — CEFEPIME HYDROCHLORIDE 2000 MG: 2 INJECTION, SOLUTION INTRAVENOUS at 23:54

## 2023-03-28 RX ADMIN — INSULIN LISPRO 1 UNITS: 100 INJECTION, SOLUTION INTRAVENOUS; SUBCUTANEOUS at 12:09

## 2023-03-28 RX ADMIN — SODIUM CHLORIDE SOLN NEBU 3% 4 ML: 3 NEBU SOLN at 07:21

## 2023-03-28 RX ADMIN — METOPROLOL SUCCINATE 100 MG: 50 TABLET, EXTENDED RELEASE ORAL at 08:43

## 2023-03-28 RX ADMIN — VALACYCLOVIR HYDROCHLORIDE 500 MG: 500 TABLET, FILM COATED ORAL at 08:43

## 2023-03-28 RX ADMIN — GUAIFENESIN 600 MG: 600 TABLET ORAL at 21:30

## 2023-03-28 RX ADMIN — CEFEPIME HYDROCHLORIDE 2000 MG: 2 INJECTION, SOLUTION INTRAVENOUS at 08:36

## 2023-03-28 RX ADMIN — CEFEPIME HYDROCHLORIDE 2000 MG: 2 INJECTION, SOLUTION INTRAVENOUS at 00:04

## 2023-03-28 RX ADMIN — GUAIFENESIN 600 MG: 600 TABLET ORAL at 08:44

## 2023-03-28 RX ADMIN — ASPIRIN 81 MG: 81 TABLET, COATED ORAL at 08:44

## 2023-03-28 RX ADMIN — LEVALBUTEROL HYDROCHLORIDE 1.25 MG: 1.25 SOLUTION, CONCENTRATE RESPIRATORY (INHALATION) at 13:43

## 2023-03-28 NOTE — ASSESSMENT & PLAN NOTE
· POA, resolved  Presenting with worsening SOB with cold-like symptoms for the past 3 weeks  Completed a course of doxycycline  · Chest x-ray showing pneumonia  · CTA PE showing: No PE, severe multifocal right pneumonia, moderate interstitial edema, stable small loculated left and trace right pleural effusion  · Troponins negative  · Severe sepsis criteria with heart rate 97, respiratory rate 20 leukocytosis of 17 94, lactic acidosis in setting of pneumonia  · Urine antigens negative  · Blood cultures negative x2 after 72 hours  · MRSA swab negative -discontinued vancomycin  · Sputum cx not collected    Patient does not report productive cough at this time  · Mildly elevated procalcitonin, downtrending  · S/p bronch 2/27, awaiting results for final Pulm recommendations  · COVID negative  · Continue IV cefepime--> transition to PO Levaquin per ID once cleared by Pulm for DC

## 2023-03-28 NOTE — PROGRESS NOTES
"Progress Note - Pulmonary   Jean Carlos Mir 69 y o  male MRN: [id number]  Unit/Bed#: -01 Encounter: [id number]    Assessment/Plan:  Patient is a 76-year-old male with history of multiple malignancies undergoing treatment with immunotherapy who was found to have an abnormal CT scan  The patient is on antibiotics as directed by infectious disease  Agree with recommendations to transition to oral Levaquin  Of note, the patient does have an elevated eosinophil count of unclear etiology  Would hold on steroids or change in medications at this time pending results of bronchoscopy  Plan of care discussed with SLIM  Chief Complaint:   Doing well    Subjective:   Patient reports he is doing well  He has no complaints at this time  He denies fevers, chills, nausea or vomiting  He feels his breathing is doing well  He denies complaints related to recent bronchoscopy  Objective:     Vitals: Blood pressure 124/66, pulse 83, temperature 98 °F (36 7 °C), resp  rate 18, height 5' 11\" (1 803 m), weight 72 9 kg (160 lb 11 5 oz), SpO2 95 %  ,Body mass index is 22 42 kg/m²  Intake/Output Summary (Last 24 hours) at 3/28/2023 1640  Last data filed at 3/28/2023 1300  Gross per 24 hour   Intake 995 ml   Output 675 ml   Net 320 ml       Invasive Devices     Peripheral Intravenous Line  Duration           Peripheral IV 03/27/23 Distal;Left;Upper;Ventral (anterior) Arm 1 day                Physical Exam  Vitals and nursing note reviewed  Constitutional:       General: He is not in acute distress  Appearance: He is well-developed  HENT:      Head: Normocephalic and atraumatic  Eyes:      Conjunctiva/sclera: Conjunctivae normal    Cardiovascular:      Rate and Rhythm: Normal rate and regular rhythm  Heart sounds: No murmur heard  Pulmonary:      Effort: Pulmonary effort is normal  No respiratory distress  Breath sounds: Normal breath sounds   No decreased breath sounds, wheezing, rhonchi or " rales    Chest:      Chest wall: No deformity or tenderness  Abdominal:      Palpations: Abdomen is soft  Tenderness: There is no abdominal tenderness  Musculoskeletal:         General: No swelling  Cervical back: Neck supple  Right lower leg: No edema  Left lower leg: No edema  Skin:     General: Skin is warm and dry  Capillary Refill: Capillary refill takes less than 2 seconds  Neurological:      General: No focal deficit present  Mental Status: He is alert and oriented to person, place, and time  Motor: No weakness  Psychiatric:         Mood and Affect: Mood normal  Mood is not anxious  Behavior: Behavior normal  Behavior is not agitated  Labs: I have personally reviewed pertinent lab results  Lab Results   Component Value Date    SODIUM 137 03/28/2023    K 3 7 03/28/2023     03/28/2023    CO2 26 03/28/2023    BUN 9 03/28/2023    CREATININE 0 69 03/28/2023    GLUC 126 03/28/2023    CALCIUM 8 6 03/28/2023     Lab Results   Component Value Date    WBC 7 24 03/28/2023    HGB 11 2 (L) 03/28/2023    HCT 34 3 (L) 03/28/2023    MCV 88 03/28/2023     03/28/2023       Imaging and other studies: I have personally reviewed pertinent reports     and I have personally reviewed pertinent films in PACS

## 2023-03-28 NOTE — ASSESSMENT & PLAN NOTE
· Follows with [facility] oncology  · On daratumumab, Velcade and dexamethasone-> hold d/t sepsis  · Patient reports he has been getting chemotherapy monthly, most recently end of February States he would be due for treatment 3/30

## 2023-03-28 NOTE — PLAN OF CARE
Problem: INFECTION - ADULT  Goal: Absence or prevention of progression during hospitalization  Description: INTERVENTIONS:  - Assess and monitor for signs and symptoms of infection  - Monitor lab/diagnostic results  - Monitor all insertion sites, i e  indwelling lines, tubes, and drains  - Monitor endotracheal if appropriate and nasal secretions for changes in amount and color  - [place] appropriate cooling/warming therapies per order  - Administer medications as ordered  - Instruct and encourage patient and family to use good hand hygiene technique  - Identify and instruct in appropriate isolation precautions for identified infection/condition  Outcome: Progressing  Goal: Absence of fever/infection during neutropenic period  Description: INTERVENTIONS:  - Monitor WBC    Outcome: Progressing

## 2023-03-28 NOTE — ASSESSMENT & PLAN NOTE
Lab Results   Component Value Date    HGBA1C 8 9 (H) 02/01/2023       Recent Labs     03/27/23  1205 03/27/23  1603 03/27/23  2110 03/28/23  0722   POCGLU 133 223* 159* 116       Blood Sugar Average: Last 72 hrs:     · Hemoglobin A1c shows poor control at 8 9  · However during hospitalization sugars have been well controlled on SSI plus Accu-Chek  · Diabetic diet

## 2023-03-28 NOTE — PROGRESS NOTES
Roman Pandya  69 y o   male  1945  mrn [id number]    Assessment/Plan:    Fever/Pneumonia/Multiple myeloma     Patient admitted with fever increased cough, hypoxia, I suspect from acute bacterial pulmonary infection  Patient with  rapid improvement on vanco and cefepime  Patient's blood cultures are negative  Strep and Legionella ag negative  RP2 panel negative  Patient's blood cultures are negative and MRSA nares is pending  Would continue vancomycin and cefepime for now, await MRSA nares and d/c vanco if negative  Not clear to me how much his pulmonary infiltrates are related to acute infection  Not sure he didn't have some CHF  He has had infilrates though in slightly different distributions for some time  Not clear if this is tumor or atypical infection  3/28:  Clinically improving on IV cefepime  Vanco d/c'd as MRSA nares was negative  Suspect that there are 2 issues here  1 acute bacterial pna/bronchitis which appears to be improving on cefepime, as well as, recurrent pulmonary infiltrates of unclear etiology, issue has been going on > 6 months  For now I would continue cefepime  Okay to change to po Levaquin 750 mg qday when ready for d/c until 4/3  Bronch studies to be followed up by pulmonary  If afebrile and not hypoxic, can probably f/u with studies as an outpt      - continue cefepime   - when ready for d/c, okay to change to PO Levaquin 750 mg qday until 4/3 when ready for d/c   - monitor for toxicities while on this medication  - f/u pulmonary for bronchoscopy results  - heme/onc follow-up     Will sign off, call with ?'s, changes in clinical status     Subjective: No fevers, less cough  No chest pains, s/p bronch yesterday  Objective:     Tc 98 2  Cor: rrr s1s2  Lungs: decreased  Abd: soft nt/nd + BS  Ext: no edema         Labs:  CBC w/diff  Recent Labs     03/28/23  0448   WBC 7 24   HGB 11 2*   HCT 34 3*      NEUTOPHILPCT 58   LYMPHOPCT 18   MONOPCT 11   EOSPCT 11*     BMP  Recent Labs     03/28/23  0448   K 3 7      CO2 26   BUN 9   CREATININE 0 69   CALCIUM 8 6     CMP  Recent Labs     03/28/23  0448   K 3 7      CO2 26   BUN 9   CREATININE 0 69   CALCIUM 8 6        labrc    Cultures:  Lab Results   Component Value Date    BLOODCX No Growth at 24 hrs  03/23/2023    BLOODCX No Growth After 4 Days  03/23/2023    BLOODCX No Growth After 5 Days  02/04/2023    BLOODCX No Growth After 5 Days  02/04/2023    BLOODCX No Growth After 5 Days  12/07/2022    BLOODCX No Growth After 5 Days  12/07/2022    BLOODCX No Growth After 5 Days  04/19/2022    BLOODCX Bacillus species NOT anthracis (A) 04/19/2022    BLOODCX No Growth After 5 Days  05/02/2021    BLOODCX No Growth After 5 Days  05/02/2021    BLOODCX No Growth After 5 Days  04/15/2021    BLOODCX No Growth After 5 Days  04/15/2021    BLOODCX No Growth After 5 Days  04/06/2021    BLOODCX No Growth After 5 Days  04/06/2021    BLOODCX No Growth After 5 Days  04/01/2021    BLOODCX No Growth After 5 Days  04/01/2021    BLOODCX No Growth After 5 Days  02/27/2019    BLOODCX No Growth After 5 Days  02/27/2019    BLOODCX No Growth After 5 Days  11/25/2018    BLOODCX No Growth After 5 Days   11/25/2018     No results found for: [facility]   Lab Results   Component Value Date    URINECX No Growth <1000 cfu/mL 02/04/2023     Lab Results   Component Value Date    SPUTUMCULTUR 2+ Growth of 04/19/2022       MED: reviewed       Current Facility-Administered Medications:   •  acetaminophen (TYLENOL) tablet 650 mg, 650 mg, Oral, Q6H PRN, Agnes Frankel MD  •  aluminum-magnesium hydroxide-simethicone (MYLANTA) oral suspension 30 mL, 30 mL, Oral, Q4H PRN, Agnes Frankel MD, 30 mL at 03/23/23 1256  •  aspirin (ECOTRIN LOW STRENGTH) EC tablet 81 mg, 81 mg, Oral, Daily, Agnes Frankel MD, 81 mg at 03/28/23 2547  •  atorvastatin (LIPITOR) tablet 40 mg, 40 mg, Oral, Daily With Violetta Shelton MD, 40 mg at 03/27/23 1542  •  benzonatate (TESSALON PERLES) capsule 100 mg, 100 mg, Oral, TID PRN, Joy Luis MD, 100 mg at 03/23/23 1255  •  cefepime (MAXIPIME) IVPB (premix in dextrose) 2,000 mg 50 mL, 2,000 mg, Intravenous, Q8H, Joy Luis MD, Last Rate: 100 mL/hr at 03/28/23 0836, 2,000 mg at 03/28/23 0836  •  diphenhydrAMINE (BENADRYL) injection 25 mg, 25 mg, Intravenous, Q6H PRN, Joy Luis MD, 25 mg at 03/23/23 1256  •  enoxaparin (LOVENOX) subcutaneous injection 40 mg, 40 mg, Subcutaneous, Daily, Joy Luis MD, 40 mg at 03/28/23 1084  •  furosemide (LASIX) tablet 20 mg, 20 mg, Oral, BID (diuretic), Terrell Frankel PA-C, 20 mg at 03/28/23 0843  •  guaiFENesin (MUCINEX) 12 hr tablet 600 mg, 600 mg, Oral, Q12H [facility], Joy Luis MD, 600 mg at 03/28/23 0844  •  insulin lispro (HumaLOG) 100 units/mL subcutaneous injection 1-5 Units, 1-5 Units, Subcutaneous, TID AC, 2 Units at 03/27/23 1754 **AND** Fingerstick Glucose (POCT), , , TID AC, Joy Luis MD  •  insulin lispro (HumaLOG) 100 units/mL subcutaneous injection 1-5 Units, 1-5 Units, Subcutaneous, HS, Joy Luis MD, 1 Units at 03/27/23 2213  •  lactated ringers infusion, 50 mL/hr, Intravenous, Continuous, Roberto Hannah, TRACIE  •  levalbuterol Mala Villanueva) inhalation solution 1 25 mg, 1 25 mg, Nebulization, TID, Segundo Vera MD, 1 25 mg at 03/28/23 2088  •  metoprolol succinate (TOPROL-XL) 24 hr tablet 100 mg, 100 mg, Oral, Daily, Joy Luis MD, 100 mg at 03/28/23 0843  •  ondansetron (ZOFRAN) injection 4 mg, 4 mg, Intravenous, Once PRN, Roberto Hannah CRNA  •  pantoprazole (PROTONIX) EC tablet 40 mg, 40 mg, Oral, Early Morning, Joy Luis MD, 40 mg at 03/28/23 3991  •  polyethylene glycol (MIRALAX) packet 17 g, 17 g, Oral, Daily PRN, Joy Luis MD  •  sodium chloride 3 % inhalation solution 4 mL, 4 mL, Nebulization, TID, Segundo Vera MD, 4 mL at 03/28/23 8194  •  trimethobenzamide (TIGAN) IM injection 200 mg, 200 mg, Intramuscular, Q6H PRN, Nadean Lax, MD  •  valACYclovir (VALTREX) tablet 500 mg, 500 mg, Oral, Daily, Agnes Frankel MD, 500 mg at 03/28/23 5995    Principal Problem:    Severe sepsis ([facility] )  Active Problems:    Type 2 diabetes mellitus without complication, without long-term current use of insulin (HCC)    Dyslipidemia    Multiple myeloma not having achieved remission (HCC)    Essential hypertension    Chronic diastolic congestive heart failure (HCC)    Paroxysmal atrial fibrillation ([facility] )    Pneumonia of right lower lobe due to infectious organism    Acute respiratory insufficiency      Eva Real MD

## 2023-03-28 NOTE — ASSESSMENT & PLAN NOTE
· Initially started on IV cefepime/vancomycin  · MRSA swab negative -discontinue vancomycin  · Initial leukocytosis now resolved    Remains afebrile  · Blood cultures negative x2 after 72 hours  · Trend WBC and fever curve  · S/p bronch 3/27, awaiting results

## 2023-03-28 NOTE — ASSESSMENT & PLAN NOTE
Wt Readings from Last 3 Encounters:   03/28/23 72 9 kg (160 lb 11 5 oz)   03/13/23 73 2 kg (161 lb 6 4 oz)   03/01/23 73 5 kg (162 lb)     · Patient appeared hypovolemic on presentation but now appears euvolemic  · Home Lasix 20 mg twice daily held on admission  · Resumed   ·   · Echo done on 5/2/2022 showing EF of 68% systolic function low normal grade 2 diastolic dysfunction aortic valve biprosthetic valve  · Low-sodium diet

## 2023-03-28 NOTE — PLAN OF CARE
Problem: Potential for Falls  Goal: Patient will remain free of falls  Description: INTERVENTIONS:  - Educate patient/family on patient safety including physical limitations  - Instruct patient to call for assistance with activity   - Consult OT/PT to assist with strengthening/mobility   - Keep Call bell within reach  - Keep bed low and locked with side rails adjusted as appropriate  - Keep care items and personal belongings within reach  - Initiate and maintain comfort rounds  - Make Fall Risk Sign visible to staff  - Offer Toileting every  Hours, in advance of need  - Initiate/Maintain alarm  - Obtain necessary fall risk management equipment:  - Apply yellow socks and bracelet for high fall risk patients  - Consider moving patient to room near nurses station  Outcome: Progressing     Problem: PAIN - ADULT  Goal: Verbalizes/displays adequate comfort level or baseline comfort level  Description: Interventions:  - Encourage patient to monitor pain and request assistance  - Assess pain using appropriate pain scale  - Administer analgesics based on type and severity of pain and evaluate response  - Implement non-pharmacological measures as appropriate and evaluate response  - Consider cultural and social influences on pain and pain management  - Notify physician/advanced practitioner if interventions unsuccessful or patient reports new pain  Outcome: Progressing     Problem: INFECTION - ADULT  Goal: Absence or prevention of progression during hospitalization  Description: INTERVENTIONS:  - Assess and monitor for signs and symptoms of infection  - Monitor lab/diagnostic results  - Monitor all insertion sites, i e  indwelling lines, tubes, and drains  - Monitor endotracheal if appropriate and nasal secretions for changes in amount and color  - [place] appropriate cooling/warming therapies per order  - Administer medications as ordered  - Instruct and encourage patient and family to use good hand hygiene technique  - Identify and instruct in appropriate isolation precautions for identified infection/condition  Outcome: Progressing  Goal: Absence of fever/infection during neutropenic period  Description: INTERVENTIONS:  - Monitor WBC    Outcome: Progressing     Problem: SAFETY ADULT  Goal: Patient will remain free of falls  Description: INTERVENTIONS:  - Educate patient/family on patient safety including physical limitations  - Instruct patient to call for assistance with activity   - Consult OT/PT to assist with strengthening/mobility   - Keep Call bell within reach  - Keep bed low and locked with side rails adjusted as appropriate  - Keep care items and personal belongings within reach  - Initiate and maintain comfort rounds  - Make Fall Risk Sign visible to staff  - Offer Toileting every  Hours, in advance of need  - Initiate/Maintain alarm  - Obtain necessary fall risk management equipment:  - Apply yellow socks and bracelet for high fall risk patients  - Consider moving patient to room near nurses station  Outcome: Progressing  Goal: Maintain or return to baseline ADL function  Description: INTERVENTIONS:  -  Assess patient's ability to carry out ADLs; assess patient's baseline for ADL function and identify physical deficits which impact ability to perform ADLs (bathing, care of mouth/teeth, toileting, grooming, dressing, etc )  - Assess/evaluate cause of self-care deficits   - Assess range of motion  - Assess patient's mobility; develop plan if impaired  - Assess patient's need for assistive devices and provide as appropriate  - Encourage maximum independence but intervene and supervise when necessary  - Involve family in performance of ADLs  - Assess for home care needs following discharge   - Consider OT consult to assist with ADL evaluation and planning for discharge  - Provide patient education as appropriate  Outcome: Progressing  Goal: Maintains/Returns to pre admission functional level  Description: INTERVENTIONS:  - Perform BMAT or MOVE assessment daily    - Set and communicate daily mobility goal to care team and patient/family/caregiver  - Collaborate with rehabilitation services on mobility goals if consulted  - Perform Range of Motion  times a day  - Reposition patient every  hours  - Dangle patient  times a day  - Stand patient  times a day  - Ambulate patient  times a day  - Out of bed to chair  times a day   - Out of bed for meals times a day  - Out of bed for toileting  - Record patient progress and toleration of activity level   Outcome: Progressing     Problem: DISCHARGE PLANNING  Goal: Discharge to home or other facility with appropriate resources  Description: INTERVENTIONS:  - Identify barriers to discharge w/patient and caregiver  - Arrange for needed discharge resources and transportation as appropriate  - Identify discharge learning needs (meds, wound care, etc )  - Arrange for interpretive services to assist at discharge as needed  - Refer to Case Management Department for coordinating discharge planning if the patient needs post-hospital services based on physician/advanced practitioner order or complex needs related to functional status, cognitive ability, or social support system  Outcome: Progressing     Problem: Knowledge Deficit  Goal: Patient/family/caregiver demonstrates understanding of disease process, treatment plan, medications, and discharge instructions  Description: Complete learning assessment and assess knowledge base    Interventions:  - Provide teaching at level of understanding  - Provide teaching via preferred learning methods  Outcome: Progressing     Problem: RESPIRATORY - ADULT  Goal: Achieves optimal ventilation and oxygenation  Description: INTERVENTIONS:  - Assess for changes in respiratory status  - Assess for changes in mentation and behavior  - Position to facilitate oxygenation and minimize respiratory effort  - Oxygen administered by appropriate delivery if ordered  - Initiate smoking cessation education as indicated  - Encourage broncho-pulmonary hygiene including cough, deep breathe, Incentive Spirometry  - Assess the need for suctioning and aspirate as needed  - Assess and instruct to report SOB or any respiratory difficulty  - Respiratory Therapy support as indicated  Outcome: Progressing     Problem: METABOLIC, FLUID AND ELECTROLYTES - ADULT  Goal: Electrolytes maintained within normal limits  Description: INTERVENTIONS:  - Monitor labs and assess patient for signs and symptoms of electrolyte imbalances  - Administer electrolyte replacement as ordered  - Monitor response to electrolyte replacements, including repeat lab results as appropriate  - Instruct patient on fluid and nutrition as appropriate  Outcome: Progressing  Goal: Fluid balance maintained  Description: INTERVENTIONS:  - Monitor labs   - Monitor I/O and WT  - Instruct patient on fluid and nutrition as appropriate  - Assess for signs & symptoms of volume excess or deficit  Outcome: Progressing     Problem: SKIN/TISSUE INTEGRITY - ADULT  Goal: Skin Integrity remains intact(Skin Breakdown Prevention)  Description: Assess:  -Perform Tay assessment every x  -Clean and moisturize skin every x  -Inspect skin when repositioning, toileting, and assisting with ADLS  -Assess under medical devices such as x every x  -Assess extremities for adequate circulation and sensation     Bed Management:  -Have minimal linens on bed & keep smooth, unwrinkled  -Change linens as needed when moist or perspiring  -Avoid sitting or lying in one position for more than x hours while in bed  -Keep HOB at [facility]     Toileting:  -Offer bedside commode  -Assess for incontinence every x  -Use incontinent care products after each incontinent episode such as x    Activity:  -Mobilize patient x times a day  -Encourage activity and walks on unit  -Encourage or provide ROM exercises   -Turn and reposition patient every x Hours  -Use appropriate equipment to lift or move patient in bed  -Instruct/ Assist with weight shifting every x when out of bed in chair  -Consider limitation of chair time x hour intervals    Skin Care:  -Avoid use of baby powder, tape, friction and shearing, hot water or constrictive clothing  -Relieve pressure over bony prominences using x  -Do not massage red bony areas    Next Steps:  -Teach patient strategies to minimize risks such as x   -Consider consults to  interdisciplinary teams such as x  Outcome: Progressing     Problem: HEMATOLOGIC - ADULT  Goal: Maintains hematologic stability  Description: INTERVENTIONS  - Assess for signs and symptoms of bleeding or hemorrhage  - Monitor labs  - Administer supportive blood products/factors as ordered and appropriate  Outcome: Progressing     Problem: MUSCULOSKELETAL - ADULT  Goal: Maintain or return mobility to safest level of function  Description: INTERVENTIONS:  - Assess patient's ability to carry out ADLs; assess patient's baseline for ADL function and identify physical deficits which impact ability to perform ADLs (bathing, care of mouth/teeth, toileting, grooming, dressing, etc )  - Assess/evaluate cause of self-care deficits   - Assess range of motion  - Assess patient's mobility  - Assess patient's need for assistive devices and provide as appropriate  - Encourage maximum independence but intervene and supervise when necessary  - Involve family in performance of ADLs  - Assess for home care needs following discharge   - Consider OT consult to assist with ADL evaluation and planning for discharge  - Provide patient education as appropriate  Outcome: Progressing     Problem: Nutrition/Hydration-ADULT  Goal: Nutrient/Hydration intake appropriate for improving, restoring or maintaining nutritional needs  Description: Monitor and assess patient's nutrition/hydration status for malnutrition  Collaborate with interdisciplinary team and initiate plan and interventions as ordered    Monitor patient's weight and dietary intake as ordered or per policy  Utilize nutrition screening tool and intervene as necessary  Determine patient's food preferences and provide high-protein, high-caloric foods as appropriate       INTERVENTIONS:  - Monitor oral intake, urinary output, labs, and treatment plans  - Assess nutrition and hydration status and recommend course of action  - Evaluate amount of meals eaten  - Assist patient with eating if necessary   - Allow adequate time for meals  - Recommend/ encourage appropriate diets, oral nutritional supplements, and vitamin/mineral supplements  - Order, calculate, and assess calorie counts as needed  - Recommend, monitor, and adjust tube feedings and TPN/PPN based on assessed needs  - Assess need for intravenous fluids  - Provide specific nutrition/hydration education as appropriate  - Include patient/family/caregiver in decisions related to nutrition  Outcome: Progressing     Problem: Prexisting or High Potential for Compromised Skin Integrity  Goal: Skin integrity is maintained or improved  Description: INTERVENTIONS:  - Identify patients at risk for skin breakdown  - Assess and monitor skin integrity  - Assess and monitor nutrition and hydration status  - Monitor labs   - Assess for incontinence   - Turn and reposition patient  - Assist with mobility/ambulation  - Relieve pressure over bony prominences  - Avoid friction and shearing  - Provide appropriate hygiene as needed including keeping skin clean and dry  - Evaluate need for skin moisturizer/barrier cream  - Collaborate with interdisciplinary team   - Patient/family teaching  - Consider wound care consult   Outcome: Progressing     Problem: MOBILITY - ADULT  Goal: Maintain or return to baseline ADL function  Description: INTERVENTIONS:  -  Assess patient's ability to carry out ADLs; assess patient's baseline for ADL function and identify physical deficits which impact ability to perform ADLs (bathing, care of mouth/teeth, toileting, grooming, dressing, etc )  - Assess/evaluate cause of self-care deficits   - Assess range of motion  - Assess patient's mobility; develop plan if impaired  - Assess patient's need for assistive devices and provide as appropriate  - Encourage maximum independence but intervene and supervise when necessary  - Involve family in performance of ADLs  - Assess for home care needs following discharge   - Consider OT consult to assist with ADL evaluation and planning for discharge  - Provide patient education as appropriate  Outcome: Progressing  Goal: Maintains/Returns to pre admission functional level  Description: INTERVENTIONS:  - Perform BMAT or MOVE assessment daily    - Set and communicate daily mobility goal to care team and patient/family/caregiver  - Collaborate with rehabilitation services on mobility goals if consulted  - Perform Range of Motion  times a day  - Reposition patient every  hours    - Dangle patient  times a day  - Stand patient  times a day  - Ambulate patient  times a day  - Out of bed to chair  times a day   - Out of bed for meals times a day  - Out of bed for toileting  - Record patient progress and toleration of activity level   Outcome: Progressing

## 2023-03-28 NOTE — PROGRESS NOTES
[place]  Progress Note  Name: Oz Arzate  MRN: [id number]  Unit/Bed#: -01 I Date of Admission: 3/23/2023   Date of Service: 3/28/2023 I Hospital Day: 5    Assessment/Plan   Acute respiratory insufficiency  Assessment & Plan  · Initially requiring 4L NC-> has since been weaned to room air  · Resolved  · See mgx of sepsis    Pneumonia of right lower lobe due to infectious organism  Assessment & Plan  · Initially started on IV cefepime/vancomycin  · MRSA swab negative -discontinue vancomycin  · Initial leukocytosis now resolved  Remains afebrile  · Blood cultures negative x2 after 72 hours  · Trend WBC and fever curve  · S/p bronch 3/27, awaiting results    Paroxysmal atrial fibrillation (HCC)  Assessment & Plan  · Rate controlled on metoprolol  · Not on anticoagulation as outpatient    Chronic diastolic congestive heart failure (HCC)  Assessment & Plan  Wt Readings from Last 3 Encounters:   03/28/23 72 9 kg (160 lb 11 5 oz)   03/13/23 73 2 kg (161 lb 6 4 oz)   03/01/23 73 5 kg (162 lb)     · Patient appeared hypovolemic on presentation but now appears euvolemic  · Home Lasix 20 mg twice daily held on admission  · Resumed   ·   · Echo done on 5/2/2022 showing EF of 18% systolic function low normal grade 2 diastolic dysfunction aortic valve biprosthetic valve  · Low-sodium diet    Essential hypertension  Assessment & Plan  · Blood pressure stable  · Continue metoprolol   · Lasix held on admission, resumed yesterday  Appears euvolemic  Multiple myeloma not having achieved remission [facility])  Assessment & Plan  · Follows with [facility] oncology  · On daratumumab, Velcade and dexamethasone-> hold d/t sepsis  · Patient reports he has been getting chemotherapy monthly, most recently end of February    States he would be due for treatment 3/30    Dyslipidemia  Assessment & Plan  · Continue statin therapy    Type 2 diabetes mellitus without complication, without long-term current use of insulin [facility])  Assessment & Plan  Lab Results   Component Value Date    HGBA1C 8 9 (H) 02/01/2023       Recent Labs     03/27/23  1205 03/27/23  1603 03/27/23  2110 03/28/23  0722   POCGLU 133 223* 159* 116       Blood Sugar Average: Last 72 hrs:     · Hemoglobin A1c shows poor control at 8 9  · However during hospitalization sugars have been well controlled on SSI plus Accu-Chek  · Diabetic diet    * Severe sepsis (HCC)  Assessment & Plan  · POA, resolved  Presenting with worsening SOB with cold-like symptoms for the past 3 weeks  Completed a course of doxycycline  · Chest x-ray showing pneumonia  · CTA PE showing: No PE, severe multifocal right pneumonia, moderate interstitial edema, stable small loculated left and trace right pleural effusion  · Troponins negative  · Severe sepsis criteria with heart rate 97, respiratory rate 20 leukocytosis of 17 94, lactic acidosis in setting of pneumonia  · Urine antigens negative  · Blood cultures negative x2 after 72 hours  · MRSA swab negative -discontinued vancomycin  · Sputum cx not collected  Patient does not report productive cough at this time  · Mildly elevated procalcitonin, downtrending  · S/p bronch 2/27, awaiting results for final Pulm recommendations  · COVID negative  · Continue IV cefepime--> transition to PO Levaquin per ID once cleared by Pulm for DC        VTE Pharmacologic Prophylaxis:   Pharmacologic: Enoxaparin (Lovenox)  Mechanical VTE Prophylaxis in Place: Yes    Patient Centered Rounds: I have performed bedside rounds with nursing staff today  Discussions with Specialists or Other Care Team Provider: Pulmonology    Education and Discussions with Family / Patient: Patient, declined call to     Time Spent for Care: 45 minutes  More than 50% of total time spent on counseling and coordination of care as described above  Current Length of Stay: 5 day(s)    Current Patient Status: Inpatient   Certification Statement:  The patient will continue to require additional inpatient hospital stay due to awaiting bronch results for final Pulm recommendations    Discharge Plan: likely tomorrow    Code Status: Level 1 - Full Code      Subjective:   Patient feels well today  He denies any shortness of breath and has been ambulating about his room without dyspnea  He denies headache or lightheadedness  Denies chest pain  Denies cough  Denies fevers or chills  Denies nausea, vomiting or diarrhea  Objective:     Vitals:   Temp (24hrs), Av 9 °F (36 6 °C), Min:97 3 °F (36 3 °C), Max:98 5 °F (36 9 °C)    Temp:  [97 3 °F (36 3 °C)-98 5 °F (36 9 °C)] 98 2 °F (36 8 °C)  HR:  [70-87] 83  Resp:  [16-20] 18  BP: ()/(53-79) 151/79  SpO2:  [90 %-98 %] 90 %  Body mass index is 22 42 kg/m²  Input and Output Summary (last 24 hours): Intake/Output Summary (Last 24 hours) at 3/28/2023 1114  Last data filed at 3/28/2023 5969  Gross per 24 hour   Intake 1095 ml   Output 875 ml   Net 220 ml       Physical Exam:     Physical Exam  Constitutional:       General: He is not in acute distress  Appearance: Normal appearance  He is normal weight  He is not ill-appearing or toxic-appearing  HENT:      Head: Normocephalic and atraumatic  Right Ear: External ear normal       Left Ear: External ear normal       Nose: Nose normal       Mouth/Throat:      Mouth: Mucous membranes are moist       Pharynx: Oropharynx is clear  Eyes:      Conjunctiva/sclera: Conjunctivae normal    Cardiovascular:      Rate and Rhythm: Normal rate and regular rhythm  Pulses: Normal pulses  Heart sounds: Normal heart sounds  No murmur heard  No friction rub  No gallop  Pulmonary:      Effort: Pulmonary effort is normal  No respiratory distress  Breath sounds: Normal breath sounds  No stridor  No wheezing, rhonchi or rales  Abdominal:      General: Abdomen is flat  Bowel sounds are normal  There is no distension        Palpations: Abdomen is soft  There is no mass  Tenderness: There is no abdominal tenderness  There is no guarding or rebound  Hernia: No hernia is present  Musculoskeletal:      Cervical back: Normal range of motion  Right lower leg: No edema  Left lower leg: No edema  Skin:     General: Skin is warm and dry  Neurological:      Mental Status: He is alert  Mental status is at baseline  Psychiatric:         Mood and Affect: Mood normal          Thought Content: Thought content normal          Additional Data:     Labs:    Results from last 7 days   Lab Units 03/28/23  0448   WBC Thousand/uL 7 24   HEMOGLOBIN g/dL 11 2*   HEMATOCRIT % 34 3*   PLATELETS Thousands/uL 294   NEUTROS PCT % 58   LYMPHS PCT % 18   MONOS PCT % 11   EOS PCT % 11*     Results from last 7 days   Lab Units 03/28/23  0448 03/25/23  0501 03/24/23  0433   POTASSIUM mmol/L 3 7   < > 3 5   CHLORIDE mmol/L 103   < > 103   CO2 mmol/L 26   < > 26   BUN mg/dL 9   < > 11   CREATININE mg/dL 0 69   < > 0 81   CALCIUM mg/dL 8 6   < > 8 5   ALK PHOS U/L  --   --  70   ALT U/L  --   --  7   AST U/L  --   --  12*    < > = values in this interval not displayed  Results from last 7 days   Lab Units 03/27/23  0525   INR  0 94       * I Have Reviewed All Lab Data Listed Above  * Additional Pertinent Lab Tests Reviewed: [place] 67 Admission Reviewed    Imaging:    Imaging Reports Reviewed Today Include: CXR  Imaging Personally Reviewed by Myself Includes:  none    Recent Cultures (last 7 days):     Results from last 7 days   Lab Units 03/27/23  1132 03/23/23  1056 03/23/23  0739   BLOOD CULTURE   --   --  No Growth After 4 Days  No Growth at 24 hrs     GRAM STAIN RESULT  1+ Polys  No bacteria seen  --   --    LEGIONELLA URINARY ANTIGEN   --  Negative  --        Last 24 Hours Medication List:   Current Facility-Administered Medications   Medication Dose Route Frequency Provider Last Rate   • acetaminophen  650 mg Oral Q6H PRN Sophronia Frames Lavon Sutton MD     • aluminum-magnesium hydroxide-simethicone  30 mL Oral Q4H PRN Joy Luis MD     • aspirin  81 mg Oral Daily Joy Luis MD     • atorvastatin  40 mg Oral Daily With Liliana Edwards MD     • benzonatate  100 mg Oral TID PRN Joy Luis MD     • cefepime  2,000 mg Intravenous Q8H Joy Luis MD 2,000 mg (03/28/23 3903)   • diphenhydrAMINE  25 mg Intravenous Q6H PRN Joy Luis MD     • enoxaparin  40 mg Subcutaneous Daily Joy Luis MD     • furosemide  20 mg Oral BID (diuretic) Terrell Frankel PA-C     • guaiFENesin  600 mg Oral Q12H Page Montoya MD     • insulin lispro  1-5 Units Subcutaneous TID AC Joy Luis MD     • insulin lispro  1-5 Units Subcutaneous HS Joy Luis MD     • lactated ringers  50 mL/hr Intravenous Continuous Roberto Flaming, CRNA     • levalbuterol  1 25 mg Nebulization TID Segundo Vera MD     • metoprolol succinate  100 mg Oral Daily Joy Luis MD     • ondansetron  4 mg Intravenous Once PRN Roberto Flaming, CRNA     • pantoprazole  40 mg Oral Early Morning Joy Luis MD     • polyethylene glycol  17 g Oral Daily PRN Joy Luis MD     • sodium chloride  4 mL Nebulization TID Segundo Vera MD     • trimethobenzamide  200 mg Intramuscular Q6H PRN Joy Luis MD     • valACYclovir  500 mg Oral Daily Joy Luis MD          Today, Patient Was Seen By: Dillon Rosales PA-C    ** Please Note: Dictation voice to text software may have been used in the creation of this document   **

## 2023-03-29 ENCOUNTER — TRANSITIONAL CARE MANAGEMENT (OUTPATIENT)
Dept: FAMILY MEDICINE CLINIC | Facility: HOSPITAL | Age: 78
End: 2023-03-29

## 2023-03-29 VITALS
OXYGEN SATURATION: 93 % | RESPIRATION RATE: 17 BRPM | HEIGHT: 71 IN | SYSTOLIC BLOOD PRESSURE: 126 MMHG | DIASTOLIC BLOOD PRESSURE: 68 MMHG | WEIGHT: 160.72 LBS | TEMPERATURE: 97.9 F | HEART RATE: 81 BPM | BODY MASS INDEX: 22.5 KG/M2

## 2023-03-29 LAB
A FLAVUS AB SER QL ID: NEGATIVE
A FUMIGATUS AB SER QL ID: NEGATIVE
A NIGER AB SER QL ID: NEGATIVE
ANION GAP SERPL CALCULATED.3IONS-SCNC: 7 MMOL/L (ref 4–13)
BACTERIA BRONCH AEROBE CULT: NO GROWTH
BASOPHILS # BLD AUTO: 0.13 THOUSANDS/ÂΜL (ref 0–0.1)
BASOPHILS NFR BLD AUTO: 2 % (ref 0–1)
BUN SERPL-MCNC: 12 MG/DL (ref 5–25)
CALCIUM SERPL-MCNC: 8.9 MG/DL (ref 8.4–10.2)
CHLORIDE SERPL-SCNC: 101 MMOL/L (ref 96–108)
CO2 SERPL-SCNC: 30 MMOL/L (ref 21–32)
CREAT SERPL-MCNC: 0.75 MG/DL (ref 0.6–1.3)
EOSINOPHIL # BLD AUTO: 0.97 THOUSAND/ÂΜL (ref 0–0.61)
EOSINOPHIL NFR BLD AUTO: 11 % (ref 0–6)
ERYTHROCYTE [DISTWIDTH] IN BLOOD BY AUTOMATED COUNT: 15.1 % (ref 11.6–15.1)
GBM AB SER IA-ACNC: <0.2 UNITS (ref 0–0.9)
GFR SERPL CREATININE-BSD FRML MDRD: 89 ML/MIN/1.73SQ M
GLUCOSE SERPL-MCNC: 122 MG/DL (ref 65–140)
GLUCOSE SERPL-MCNC: 134 MG/DL (ref 65–140)
GLUCOSE SERPL-MCNC: 245 MG/DL (ref 65–140)
GRAM STN SPEC: NORMAL
GRAM STN SPEC: NORMAL
HCT VFR BLD AUTO: 33.7 % (ref 36.5–49.3)
HGB BLD-MCNC: 10.9 G/DL (ref 12–17)
IMM GRANULOCYTES # BLD AUTO: 0.07 THOUSAND/UL (ref 0–0.2)
IMM GRANULOCYTES NFR BLD AUTO: 1 % (ref 0–2)
LYMPHOCYTES # BLD AUTO: 1.49 THOUSANDS/ÂΜL (ref 0.6–4.47)
LYMPHOCYTES NFR BLD AUTO: 17 % (ref 14–44)
MCH RBC QN AUTO: 28.6 PG (ref 26.8–34.3)
MCHC RBC AUTO-ENTMCNC: 32.3 G/DL (ref 31.4–37.4)
MCV RBC AUTO: 89 FL (ref 82–98)
MONOCYTES # BLD AUTO: 0.97 THOUSAND/ÂΜL (ref 0.17–1.22)
MONOCYTES NFR BLD AUTO: 11 % (ref 4–12)
NEUTROPHILS # BLD AUTO: 5.07 THOUSANDS/ÂΜL (ref 1.85–7.62)
NEUTS SEG NFR BLD AUTO: 58 % (ref 43–75)
NRBC BLD AUTO-RTO: 0 /100 WBCS
PLATELET # BLD AUTO: 277 THOUSANDS/UL (ref 149–390)
PMV BLD AUTO: 9.5 FL (ref 8.9–12.7)
POTASSIUM SERPL-SCNC: 3.4 MMOL/L (ref 3.5–5.3)
PROTEINASE3 AB SER IA-ACNC: <0.2 UNITS (ref 0–0.9)
RBC # BLD AUTO: 3.81 MILLION/UL (ref 3.88–5.62)
SCAN RESULT: NORMAL
SODIUM SERPL-SCNC: 138 MMOL/L (ref 135–147)
WBC # BLD AUTO: 8.7 THOUSAND/UL (ref 4.31–10.16)

## 2023-03-29 RX ORDER — BENZONATATE 100 MG/1
100 CAPSULE ORAL 3 TIMES DAILY PRN
Qty: 20 CAPSULE | Refills: 0 | Status: SHIPPED | OUTPATIENT
Start: 2023-03-29

## 2023-03-29 RX ORDER — POTASSIUM CHLORIDE 20 MEQ/1
40 TABLET, EXTENDED RELEASE ORAL ONCE
Status: COMPLETED | OUTPATIENT
Start: 2023-03-29 | End: 2023-03-29

## 2023-03-29 RX ORDER — LEVOFLOXACIN 750 MG/1
750 TABLET ORAL EVERY 24 HOURS
Qty: 5 TABLET | Refills: 0 | Status: SHIPPED | OUTPATIENT
Start: 2023-03-29 | End: 2023-04-03

## 2023-03-29 RX ADMIN — CEFEPIME HYDROCHLORIDE 2000 MG: 2 INJECTION, SOLUTION INTRAVENOUS at 08:22

## 2023-03-29 RX ADMIN — SODIUM CHLORIDE SOLN NEBU 3% 4 ML: 3 NEBU SOLN at 07:19

## 2023-03-29 RX ADMIN — LEVALBUTEROL HYDROCHLORIDE 1.25 MG: 1.25 SOLUTION, CONCENTRATE RESPIRATORY (INHALATION) at 07:19

## 2023-03-29 RX ADMIN — ENOXAPARIN SODIUM 40 MG: 100 INJECTION SUBCUTANEOUS at 08:22

## 2023-03-29 RX ADMIN — VALACYCLOVIR HYDROCHLORIDE 500 MG: 500 TABLET, FILM COATED ORAL at 08:38

## 2023-03-29 RX ADMIN — POTASSIUM CHLORIDE 40 MEQ: 1500 TABLET, EXTENDED RELEASE ORAL at 08:39

## 2023-03-29 RX ADMIN — GUAIFENESIN 600 MG: 600 TABLET ORAL at 08:38

## 2023-03-29 RX ADMIN — METOPROLOL SUCCINATE 100 MG: 50 TABLET, EXTENDED RELEASE ORAL at 08:38

## 2023-03-29 RX ADMIN — INSULIN LISPRO 2 UNITS: 100 INJECTION, SOLUTION INTRAVENOUS; SUBCUTANEOUS at 11:20

## 2023-03-29 RX ADMIN — ASPIRIN 81 MG: 81 TABLET, COATED ORAL at 08:39

## 2023-03-29 RX ADMIN — FUROSEMIDE 20 MG: 20 TABLET ORAL at 08:39

## 2023-03-29 RX ADMIN — PANTOPRAZOLE SODIUM 40 MG: 40 TABLET, DELAYED RELEASE ORAL at 06:20

## 2023-03-29 NOTE — ASSESSMENT & PLAN NOTE
· POA, resolved  Presenting with worsening SOB with cold-like symptoms for the past 3 weeks  Completed a course of doxycycline  · Chest x-ray showing pneumonia  · CTA PE showing: No PE, severe multifocal right pneumonia, moderate interstitial edema, stable small loculated left and trace right pleural effusion  · Severe sepsis criteria with heart rate 97, respiratory rate 20 leukocytosis of 17 94, lactic acidosis in setting of pneumonia  · Urine antigens negative  · Blood cultures negative x2 after 72 hours  · MRSA swab negative -discontinued vancomycin  · Sputum cx not collected    Patient does not report productive cough at this time  · Mildly elevated procalcitonin, downtrending  · S/p bronch 2/27, follow up results with Pulm  · COVID negative  · Continue IV cefepime--> transition to PO Levaquin per ID

## 2023-03-29 NOTE — PLAN OF CARE
Problem: Potential for Falls  Goal: Patient will remain free of falls  Description: INTERVENTIONS:  - Educate patient/family on patient safety including physical limitations  - Instruct patient to call for assistance with activity   - Consult OT/PT to assist with strengthening/mobility   - Keep Call bell within reach  - Keep bed low and locked with side rails adjusted as appropriate  - Keep care items and personal belongings within reach  - Initiate and maintain comfort rounds  - Make Fall Risk Sign visible to staff  - Offer Toileting every  Hours, in advance of need  - Initiate/Maintain alarm  - Obtain necessary fall risk management equipment:  - Apply yellow socks and bracelet for high fall risk patients  - Consider moving patient to room near nurses station  3/29/2023 1300 by Paxton Oshea RN  Outcome: Adequate for Discharge  3/29/2023 6554 by Paxton Oshea RN  Outcome: Progressing     Problem: PAIN - ADULT  Goal: Verbalizes/displays adequate comfort level or baseline comfort level  Description: Interventions:  - Encourage patient to monitor pain and request assistance  - Assess pain using appropriate pain scale  - Administer analgesics based on type and severity of pain and evaluate response  - Implement non-pharmacological measures as appropriate and evaluate response  - Consider cultural and social influences on pain and pain management  - Notify physician/advanced practitioner if interventions unsuccessful or patient reports new pain  3/29/2023 1300 by Paxton Oshea RN  Outcome: Adequate for Discharge  3/29/2023 9466 by Paxton Oshea RN  Outcome: Progressing     Problem: INFECTION - ADULT  Goal: Absence or prevention of progression during hospitalization  Description: INTERVENTIONS:  - Assess and monitor for signs and symptoms of infection  - Monitor lab/diagnostic results  - Monitor all insertion sites, i e  indwelling lines, tubes, and drains  - Monitor endotracheal if appropriate and nasal secretions for changes in amount and color  - [place] appropriate cooling/warming therapies per order  - Administer medications as ordered  - Instruct and encourage patient and family to use good hand hygiene technique  - Identify and instruct in appropriate isolation precautions for identified infection/condition  3/29/2023 1300 by Yvonne Parsons RN  Outcome: Adequate for Discharge  3/29/2023 6031 by Yvonne Parsons RN  Outcome: Progressing  Goal: Absence of fever/infection during neutropenic period  Description: INTERVENTIONS:  - Monitor WBC    3/29/2023 1300 by Yvonne Parsons RN  Outcome: Adequate for Discharge  3/29/2023 6644 by Yvonne Parsons RN  Outcome: Progressing     Problem: SAFETY ADULT  Goal: Patient will remain free of falls  Description: INTERVENTIONS:  - Educate patient/family on patient safety including physical limitations  - Instruct patient to call for assistance with activity   - Consult OT/PT to assist with strengthening/mobility   - Keep Call bell within reach  - Keep bed low and locked with side rails adjusted as appropriate  - Keep care items and personal belongings within reach  - Initiate and maintain comfort rounds  - Make Fall Risk Sign visible to staff  - Offer Toileting every  Hours, in advance of need  - Initiate/Maintain alarm  - Obtain necessary fall risk management equipment:  - Apply yellow socks and bracelet for high fall risk patients  - Consider moving patient to room near nurses station  3/29/2023 1300 by Yvonne Parsons RN  Outcome: Adequate for Discharge  3/29/2023 8699 by Yvonne Parsons RN  Outcome: Progressing  Goal: Maintain or return to baseline ADL function  Description: INTERVENTIONS:  -  Assess patient's ability to carry out ADLs; assess patient's baseline for ADL function and identify physical deficits which impact ability to perform ADLs (bathing, care of mouth/teeth, toileting, grooming, dressing, etc )  - Assess/evaluate cause of self-care deficits   - Assess range of motion  - Assess patient's mobility; develop plan if impaired  - Assess patient's need for assistive devices and provide as appropriate  - Encourage maximum independence but intervene and supervise when necessary  - Involve family in performance of ADLs  - Assess for home care needs following discharge   - Consider OT consult to assist with ADL evaluation and planning for discharge  - Provide patient education as appropriate  3/29/2023 1300 by Jun Medeiros RN  Outcome: Adequate for Discharge  3/29/2023 9056 by Jun Medeiros RN  Outcome: Progressing  Goal: Maintains/Returns to pre admission functional level  Description: INTERVENTIONS:  - Perform BMAT or MOVE assessment daily    - Set and communicate daily mobility goal to care team and patient/family/caregiver  - Collaborate with rehabilitation services on mobility goals if consulted  - Perform Range of Motion  times a day  - Reposition patient every  hours    - Dangle patient  times a day  - Stand patient  times a day  - Ambulate patient  times a day  - Out of bed to chair  times a day   - Out of bed for meals times a day  - Out of bed for toileting  - Record patient progress and toleration of activity level   3/29/2023 1300 by Jun Medeiros RN  Outcome: Adequate for Discharge  3/29/2023 1557 by Jun Medeiros RN  Outcome: Progressing     Problem: DISCHARGE PLANNING  Goal: Discharge to home or other facility with appropriate resources  Description: INTERVENTIONS:  - Identify barriers to discharge w/patient and caregiver  - Arrange for needed discharge resources and transportation as appropriate  - Identify discharge learning needs (meds, wound care, etc )  - Arrange for interpretive services to assist at discharge as needed  - Refer to Case Management Department for coordinating discharge planning if the patient needs post-hospital services based on physician/advanced practitioner order or complex needs related to functional status, cognitive ability, or social support system  3/29/2023 1300 by Richie Lubin RN  Outcome: Adequate for Discharge  3/29/2023 8110 by Richie Lubin RN  Outcome: Progressing     Problem: Knowledge Deficit  Goal: Patient/family/caregiver demonstrates understanding of disease process, treatment plan, medications, and discharge instructions  Description: Complete learning assessment and assess knowledge base    Interventions:  - Provide teaching at level of understanding  - Provide teaching via preferred learning methods  3/29/2023 1300 by Richie Lubin RN  Outcome: Adequate for Discharge  3/29/2023 4741 by Richie Lubin RN  Outcome: Progressing     Problem: RESPIRATORY - ADULT  Goal: Achieves optimal ventilation and oxygenation  Description: INTERVENTIONS:  - Assess for changes in respiratory status  - Assess for changes in mentation and behavior  - Position to facilitate oxygenation and minimize respiratory effort  - Oxygen administered by appropriate delivery if ordered  - Initiate smoking cessation education as indicated  - Encourage broncho-pulmonary hygiene including cough, deep breathe, Incentive Spirometry  - Assess the need for suctioning and aspirate as needed  - Assess and instruct to report SOB or any respiratory difficulty  - Respiratory Therapy support as indicated  3/29/2023 1300 by Richie Lubin RN  Outcome: Adequate for Discharge  3/29/2023 7715 by Richie Lubin RN  Outcome: Progressing     Problem: METABOLIC, FLUID AND ELECTROLYTES - ADULT  Goal: Electrolytes maintained within normal limits  Description: INTERVENTIONS:  - Monitor labs and assess patient for signs and symptoms of electrolyte imbalances  - Administer electrolyte replacement as ordered  - Monitor response to electrolyte replacements, including repeat lab results as appropriate  - Instruct patient on fluid and nutrition as appropriate  3/29/2023 1300 by Richie Lubin RN  Outcome: Adequate for Discharge  3/29/2023 1397 by Mc La RN  Outcome: Progressing  Goal: Fluid balance maintained  Description: INTERVENTIONS:  - Monitor labs   - Monitor I/O and WT  - Instruct patient on fluid and nutrition as appropriate  - Assess for signs & symptoms of volume excess or deficit  3/29/2023 1300 by Mc La RN  Outcome: Adequate for Discharge  3/29/2023 9874 by Mc La RN  Outcome: Progressing     Problem: SKIN/TISSUE INTEGRITY - ADULT  Goal: Skin Integrity remains intact(Skin Breakdown Prevention)  Description: Assess:  -Perform Tay assessment every x  -Clean and moisturize skin every x  -Inspect skin when repositioning, toileting, and assisting with ADLS  -Assess under medical devices such as x every x  -Assess extremities for adequate circulation and sensation     Bed Management:  -Have minimal linens on bed & keep smooth, unwrinkled  -Change linens as needed when moist or perspiring  -Avoid sitting or lying in one position for more than x hours while in bed  -Keep HOB at xxdegrees     Toileting:  -Offer bedside commode  -Assess for incontinence every x  -Use incontinent care products after each incontinent episode such as x    Activity:  -Mobilize patient x times a day  -Encourage activity and walks on unit  -Encourage or provide ROM exercises   -Turn and reposition patient every x Hours  -Use appropriate equipment to lift or move patient in bed  -Instruct/ Assist with weight shifting every x when out of bed in chair  -Consider limitation of chair time x hour intervals    Skin Care:  -Avoid use of baby powder, tape, friction and shearing, hot water or constrictive clothing  -Relieve pressure over bony prominences using x  -Do not massage red bony areas    Next Steps:  -Teach patient strategies to minimize risks such as x   -Consider consults to  interdisciplinary teams such as x  3/29/2023 1300 by Mc La RN  Outcome: Adequate for Discharge  3/29/2023 1791 by Twila Stanton RN  Outcome: Progressing     Problem: HEMATOLOGIC - ADULT  Goal: Maintains hematologic stability  Description: INTERVENTIONS  - Assess for signs and symptoms of bleeding or hemorrhage  - Monitor labs  - Administer supportive blood products/factors as ordered and appropriate  3/29/2023 1300 by Twila Stanton RN  Outcome: Adequate for Discharge  3/29/2023 5521 by Twila Stanton RN  Outcome: Progressing     Problem: MUSCULOSKELETAL - ADULT  Goal: Maintain or return mobility to safest level of function  Description: INTERVENTIONS:  - Assess patient's ability to carry out ADLs; assess patient's baseline for ADL function and identify physical deficits which impact ability to perform ADLs (bathing, care of mouth/teeth, toileting, grooming, dressing, etc )  - Assess/evaluate cause of self-care deficits   - Assess range of motion  - Assess patient's mobility  - Assess patient's need for assistive devices and provide as appropriate  - Encourage maximum independence but intervene and supervise when necessary  - Involve family in performance of ADLs  - Assess for home care needs following discharge   - Consider OT consult to assist with ADL evaluation and planning for discharge  - Provide patient education as appropriate  3/29/2023 1300 by Twila Stanton RN  Outcome: Adequate for Discharge  3/29/2023 8031 by Twila Stanton RN  Outcome: Progressing     Problem: Nutrition/Hydration-ADULT  Goal: Nutrient/Hydration intake appropriate for improving, restoring or maintaining nutritional needs  Description: Monitor and assess patient's nutrition/hydration status for malnutrition  Collaborate with interdisciplinary team and initiate plan and interventions as ordered  Monitor patient's weight and dietary intake as ordered or per policy  Utilize nutrition screening tool and intervene as necessary   Determine patient's food preferences and provide high-protein, high-caloric foods as appropriate       INTERVENTIONS:  - Monitor oral intake, urinary output, labs, and treatment plans  - Assess nutrition and hydration status and recommend course of action  - Evaluate amount of meals eaten  - Assist patient with eating if necessary   - Allow adequate time for meals  - Recommend/ encourage appropriate diets, oral nutritional supplements, and vitamin/mineral supplements  - Order, calculate, and assess calorie counts as needed  - Recommend, monitor, and adjust tube feedings and TPN/PPN based on assessed needs  - Assess need for intravenous fluids  - Provide specific nutrition/hydration education as appropriate  - Include patient/family/caregiver in decisions related to nutrition  3/29/2023 1300 by Es Yancey RN  Outcome: Adequate for Discharge  3/29/2023 3366 by Es Yancey RN  Outcome: Progressing     Problem: Prexisting or High Potential for Compromised Skin Integrity  Goal: Skin integrity is maintained or improved  Description: INTERVENTIONS:  - Identify patients at risk for skin breakdown  - Assess and monitor skin integrity  - Assess and monitor nutrition and hydration status  - Monitor labs   - Assess for incontinence   - Turn and reposition patient  - Assist with mobility/ambulation  - Relieve pressure over bony prominences  - Avoid friction and shearing  - Provide appropriate hygiene as needed including keeping skin clean and dry  - Evaluate need for skin moisturizer/barrier cream  - Collaborate with interdisciplinary team   - Patient/family teaching  - Consider wound care consult   3/29/2023 1300 by Es Yancey RN  Outcome: Adequate for Discharge  3/29/2023 8298 by Es Yancey RN  Outcome: Progressing     Problem: MOBILITY - ADULT  Goal: Maintain or return to baseline ADL function  Description: INTERVENTIONS:  -  Assess patient's ability to carry out ADLs; assess patient's baseline for ADL function and identify physical deficits which impact ability to perform ADLs (bathing, care of mouth/teeth, toileting, grooming, dressing, etc )  - Assess/evaluate cause of self-care deficits   - Assess range of motion  - Assess patient's mobility; develop plan if impaired  - Assess patient's need for assistive devices and provide as appropriate  - Encourage maximum independence but intervene and supervise when necessary  - Involve family in performance of ADLs  - Assess for home care needs following discharge   - Consider OT consult to assist with ADL evaluation and planning for discharge  - Provide patient education as appropriate  3/29/2023 1300 by Mc La RN  Outcome: Adequate for Discharge  3/29/2023 1030 by Mc La RN  Outcome: Progressing  Goal: Maintains/Returns to pre admission functional level  Description: INTERVENTIONS:  - Perform BMAT or MOVE assessment daily    - Set and communicate daily mobility goal to care team and patient/family/caregiver  - Collaborate with rehabilitation services on mobility goals if consulted  - Perform Range of Motion  times a day  - Reposition patient every  hours    - Dangle patient  times a day  - Stand patient  times a day  - Ambulate patient  times a day  - Out of bed to chair  times a day   - Out of bed for meals times a day  - Out of bed for toileting  - Record patient progress and toleration of activity level   3/29/2023 1300 by Mc La RN  Outcome: Adequate for Discharge  3/29/2023 9587 by Mc La RN  Outcome: Progressing

## 2023-03-29 NOTE — ASSESSMENT & PLAN NOTE
Wt Readings from Last 3 Encounters:   03/29/23 72 9 kg (160 lb 11 5 oz)   03/13/23 73 2 kg (161 lb 6 4 oz)   03/01/23 73 5 kg (162 lb)     · Patient appeared hypovolemic on presentation but now appears euvolemic  · Home Lasix 20 mg twice daily held on admission  · Resumed   ·   · Echo done on 5/2/2022 showing EF of 43% systolic function low normal grade 2 diastolic dysfunction aortic valve biprosthetic valve  · Low-sodium diet

## 2023-03-29 NOTE — ASSESSMENT & PLAN NOTE
Lab Results   Component Value Date    HGBA1C 8 9 (H) 02/01/2023       Recent Labs     03/28/23  1611 03/28/23  1615 03/28/23  2242 03/29/23  0626   POCGLU <20* 179* 213* 122       Blood Sugar Average: Last 72 hrs:     · Hemoglobin A1c shows poor control at 8 9  · However during hospitalization sugars have been well controlled on SSI plus Accu-Chek  · Diabetic diet

## 2023-03-29 NOTE — NURSING NOTE
Reviewed d/c instructions with pt  And his spouse  Reviewed f/u appts  And new rx scripts  Informed pt  He should contact [facility] re: his chemo treatment scheduled for tomorrow  Pt  Was d/c'd to home and transported by his spouse   Pt  Ambulated to lobby with spouse & RN

## 2023-03-29 NOTE — ASSESSMENT & PLAN NOTE
· Follows with [facility] oncology  · On daratumumab, Velcade and dexamethasone-> HOLD resumption until final results of bronch  · Patient reports he has been getting chemotherapy monthly, most recently end of February    States he would be due for treatment 3/30

## 2023-03-29 NOTE — ASSESSMENT & PLAN NOTE
· Initially started on IV cefepime/vancomycin  · MRSA swab negative -discontinue vancomycin  · Initial leukocytosis now resolved    Remains afebrile  · Blood cultures negative x2 after 72 hours  · Trend WBC and fever curve  · S/p bronch 3/27, awaiting results  · Levaquin on discharge

## 2023-03-29 NOTE — ASSESSMENT & PLAN NOTE
· Blood pressure stable  · Continue metoprolol   · Lasix held on admission, resumed yesterday  Appears euvolemic

## 2023-03-29 NOTE — CASE MANAGEMENT
Case Management Discharge Planning Note    Patient name Marcey Gitelman  Location [address] [id number] MRN [id number]  : 1945 Date 3/29/2023       Current Admission Date: 3/23/2023  Current Admission Diagnosis:Severe sepsis [facility])   Patient Active Problem List    Diagnosis Date Noted   • Pneumonia of right lower lobe due to infectious organism 2023   • Acute respiratory insufficiency 2023   • Foraminal stenosis of cervical region    • Cervical radiculopathy    • Fever 2023   • Severe sepsis ([facility] ) 2023   • CVID (common variable immunodeficiency) ([place] [address] ) 2022   • Glossitis 2022   • Chest pain 2022   • Mouth ulcers 2021   • Iron deficiency anemia 2021   • Gastroesophageal reflux disease with esophagitis 2021   • Roberts's esophagus without dysplasia 2019   • Schatzki's ring of distal esophagus 2019   • Pharyngeal dysphagia 10/11/2019   • Personal history of colon cancer 10/11/2019   • Paroxysmal atrial fibrillation (HCC)    • Leukocytosis 2019   • Chronic diastolic congestive heart failure ([facility] ) 2019   • History of aortic valve replacement with bioprosthetic valve 2019   • S/P total knee arthroplasty, left 2019   • Essential hypertension 2018   • Hx of CABG    • Primary localized osteoarthritis of right knee 2018   • Multiple myeloma not having achieved remission ([place] [address] ) 2018   • Basal cell carcinoma of skin 2018   • Erectile dysfunction 2017   • Coronary artery disease involving native heart with angina pectoris, unspecified vessel or lesion type ([facility] ) 10/07/2014   • Malignant tumor of cecum ([place] [address] ) 2013   • Type 2 diabetes mellitus without complication, without long-term current use of insulin ([place] [address] ) 2012   • Fatty liver 2012   • Lymphoma ([facility] ) 2012   • Dyslipidemia 2012      LOS (days): 6  Geometric Mean LOS (GMLOS) (days): 5 00  Days to GMLOS:-1 2 OBJECTIVE:  Risk of Unplanned Readmission Score: 30 57      Current admission status: Inpatient   Preferred Pharmacy:   [id number] - [address] [place] [address]  Phone: [phone] Fax: [phone]    Primary Care Provider: Viva Fleischer, DO    Primary Insurance: Evino CROSS [employer] REP  Secondary Insurance:     DISCHARGE DETAILS:    Discharge planning discussed with[de-identified] patient  Freedom of Choice: Yes     CM contacted family/caregiver?: No- see comments (pt is alert and oriented)  Were Treatment Team discharge recommendations reviewed with patient/caregiver?: Yes  Did patient/caregiver verbalize understanding of patient care needs?: Yes  Were patient/caregiver advised of the risks associated with not following Treatment Team discharge recommendations?: Yes    [facility]         [facility] No    DME Referral Provided  Referral made for DME?: No    Other Referral/Resources/Interventions Provided:  Interventions: None Indicated    Treatment Team Recommendation: Home  Discharge Destination Plan[de-identified] Home     IMM Given (Date):: 03/29/23  IMM Given to[de-identified] Patient  Family notified[de-identified] Reviewed IMM with pt  Pt was in agreement with discharge

## 2023-03-29 NOTE — DISCHARGE SUMMARY
[place]  Discharge- Lacieatricnasim Johnson 1945, 69 y o  male MRN: [id number]  Unit/Bed#: -01 Encounter: [id number]  Primary Care Provider: Aliza Easton DO   Date and time admitted to hospital: 3/23/2023  7:15 AM    Acute respiratory insufficiency  Assessment & Plan  · Initially requiring 4L NC-> has since been weaned to room air  · Resolved  · See mgx of sepsis    Pneumonia of right lower lobe due to infectious organism  Assessment & Plan  · Initially started on IV cefepime/vancomycin  · MRSA swab negative -discontinue vancomycin  · Initial leukocytosis now resolved  Remains afebrile  · Blood cultures negative x2 after 72 hours  · Trend WBC and fever curve  · S/p bronch 3/27, awaiting results  · Levaquin on discharge    Paroxysmal atrial fibrillation (HCC)  Assessment & Plan  · Rate controlled on metoprolol  · Not on anticoagulation as outpatient    Chronic diastolic congestive heart failure (HCC)  Assessment & Plan  Wt Readings from Last 3 Encounters:   03/29/23 72 9 kg (160 lb 11 5 oz)   03/13/23 73 2 kg (161 lb 6 4 oz)   03/01/23 73 5 kg (162 lb)     · Patient appeared hypovolemic on presentation but now appears euvolemic  · Home Lasix 20 mg twice daily held on admission  · Resumed   ·   · Echo done on 5/2/2022 showing EF of 99% systolic function low normal grade 2 diastolic dysfunction aortic valve biprosthetic valve  · Low-sodium diet    Essential hypertension  Assessment & Plan  · Blood pressure stable  · Continue metoprolol   · Lasix held on admission, resumed yesterday  Appears euvolemic  Multiple myeloma not having achieved remission [facility])  Assessment & Plan  · Follows with [facility] oncology  · On daratumumab, Velcade and dexamethasone-> HOLD resumption until final results of bronch  · Patient reports he has been getting chemotherapy monthly, most recently end of February    States he would be due for treatment 3/30    Dyslipidemia  Assessment & Plan  · Continue statin therapy    Type 2 diabetes mellitus without complication, without long-term current use of insulin [facility])  Assessment & Plan  Lab Results   Component Value Date    HGBA1C 8 9 (H) 02/01/2023       Recent Labs     03/28/23  1611 03/28/23  1615 03/28/23  2242 03/29/23  0626   POCGLU <20* 179* 213* 122       Blood Sugar Average: Last 72 hrs:     · Hemoglobin A1c shows poor control at 8 9  · However during hospitalization sugars have been well controlled on SSI plus Accu-Chek  · Diabetic diet    * Severe sepsis (HCC)  Assessment & Plan  · POA, resolved  Presenting with worsening SOB with cold-like symptoms for the past 3 weeks  Completed a course of doxycycline  · Chest x-ray showing pneumonia  · CTA PE showing: No PE, severe multifocal right pneumonia, moderate interstitial edema, stable small loculated left and trace right pleural effusion  · Severe sepsis criteria with heart rate 97, respiratory rate 20 leukocytosis of 17 94, lactic acidosis in setting of pneumonia  · Urine antigens negative  · Blood cultures negative x2 after 72 hours  · MRSA swab negative -discontinued vancomycin  · Sputum cx not collected    Patient does not report productive cough at this time  · Mildly elevated procalcitonin, downtrending  · S/p bronch 2/27, follow up results with Pulm  · COVID negative  · Continue IV cefepime--> transition to PO Levaquin per ID     Medical Problems     Resolved Problems  Date Reviewed: 3/29/2023          Resolved    Hypokalemia 3/26/2023     Resolved by  Luda Boothe PA-C        Discharging Physician / Practitioner: Haseeb Rowe PA-C  PCP: Shantelle Amador DO  Admission Date:   Admission Orders (From admission, onward)     Ordered        03/23/23 1009  INPATIENT ADMISSION  Once                      Discharge Date: 03/29/23    Consultations During Hospital Stay:  · Pulmonology  · Infectious disease    Procedures Performed:   · Bronchoscopy    Significant Findings / Test Results: · White blood cell count 17 3/23, leukocytosis resolved  Eosinophilia on differential of 0 86  · Hypokalemic on admission with potassium of 3  · Lactate elevated to 2 3  · Initial procalcitonin was 0 19  · Blood cultures negative after 5 days  · BNP slightly elevated at 326  · TSH 7 168  · Legionella and strep urine antigens negative  · Respiratory panel negative  · MRSA negative  · TANYA normal, rheumatoid factor negative, anti-CCP negative  · CTA PE negative for PE, severe multifocal right pneumonia with moderate interstitial edema  Stable small loculated left and trace right pleural effusions  · Chest x-ray with infiltrates in the right middle and lower lung  Mild congestive changes  Incidental Findings:   None     Test Results Pending at Discharge (will require follow up): · Will need to follow-up with pulm for results of serologies and for bronc results  Outpatient Tests Requested:  · None    Complications: None    Reason for Admission: Acute hypoxic respiratory failure, multifocal pneumonia    Hospital Course:   Arcenio Brewer is a 69 y o  male patient with past medical history of paroxysmal A-fib, diastolic CHF, hypertension, multiple myeloma on chemotherapy and steroids, diabetes who originally presented to the hospital on 3/23/2023 due to gradually worsening shortness of breath and cold-like symptoms  He completed a course of Doxy as an outpatient however began spiking fevers and presented to the ED  He was started on cefepime and Vanco in the ED and met sepsis criteria  His CTA PE was negative for PE but revealed severe multifocal right-sided pneumonia  Of note his CBC revealed an eosinophilia as well  He was admitted to medicine  He was continued on cefepime and bank for severe sepsis and continued on IV fluids with clearance of his lactate  ID and pulm were consulted     Patient's home immunotherapy and steroids for his malignancies were held in the setting of acute infection and "eosinophilia  MRSA resulted negative and patient was narrowed to cefepime  His blood cultures remained negative  He was gradually weaned to room air with resolution of dyspnea completely  Patient ultimately underwent bronchoscopy on 3/27  He was cleared by pulmonology for discharge on 3/29/2023 and will follow-up with them in the office to continue evaluation of serologies and [facility] results  Infectious disease felt patient was stable to transition to oral Levaquin on discharge to complete course by 4/3  Please see above list of diagnoses and related plan for additional information  Condition at Discharge: fair    Discharge Day Visit / Exam:   Subjective: Patient feels very well today  He denies any shortness of breath whatsoever and has been ambulating about his room without dyspnea on exertion  Denies fever or chills  Denies cough today  Denies headache or lightheadedness  He denies any lower extremity edema  Denies chest pain  Denies nausea, vomiting or diarrhea and has been eating well  Vitals: Blood Pressure: 126/68 (03/29/23 0651)  Pulse: 81 (03/29/23 0651)  Temperature: 97 9 °F (36 6 °C) (03/29/23 0651)  Temp Source: Oral (03/29/23 0503)  Respirations: 17 (03/29/23 0651)  Height: 5' 11\" (180 3 cm) (03/25/23 0900)  Weight - Scale: 72 9 kg (160 lb 11 5 oz) (03/29/23 0600)  SpO2: 93 % (03/29/23 0719)  Exam:   Physical Exam  Vitals and nursing note reviewed  Constitutional:       General: He is not in acute distress  Appearance: He is normal weight  He is not ill-appearing or toxic-appearing  HENT:      Head: Normocephalic and atraumatic  Right Ear: External ear normal       Left Ear: External ear normal       Mouth/Throat:      Mouth: Mucous membranes are moist       Pharynx: Oropharynx is clear  Eyes:      Conjunctiva/sclera: Conjunctivae normal    Cardiovascular:      Rate and Rhythm: Normal rate and regular rhythm  Pulses: Normal pulses        Heart sounds: Normal heart " sounds  No murmur heard  No gallop  Pulmonary:      Effort: Pulmonary effort is normal  No respiratory distress  Breath sounds: Normal breath sounds  No stridor  No wheezing, rhonchi or rales  Abdominal:      General: Abdomen is flat  Bowel sounds are normal  There is no distension  Palpations: Abdomen is soft  There is no mass  Tenderness: There is no abdominal tenderness  There is no guarding or rebound  Hernia: No hernia is present  Musculoskeletal:      Cervical back: Normal range of motion  Right lower leg: No edema  Left lower leg: No edema  Skin:     General: Skin is warm and dry  Neurological:      Mental Status: He is alert  Mental status is at baseline  Psychiatric:         Mood and Affect: Mood normal          Thought Content: Thought content normal           Discussion with Family: Patient declined call to   Discharge instructions/Information to patient and family:   See after visit summary for information provided to patient and family  Provisions for Follow-Up Care:  See after visit summary for information related to follow-up care and any pertinent home health orders  Disposition:   Home    Planned Readmission: no     Discharge Statement:  I spent 45 minutes discharging the patient  This time was spent on the day of discharge  I had direct contact with the patient on the day of discharge  Greater than 50% of the total time was spent examining patient, answering all patient questions, arranging and discussing plan of care with patient as well as directly providing post-discharge instructions  Additional time then spent on discharge activities  Discharge Medications:  See after visit summary for reconciled discharge medications provided to patient and/or family        **Please Note: This note may have been constructed using a voice recognition system**

## 2023-03-29 NOTE — PLAN OF CARE
Problem: Potential for Falls  Goal: Patient will remain free of falls  Description: INTERVENTIONS:  - Educate patient/family on patient safety including physical limitations  - Instruct patient to call for assistance with activity   - Consult OT/PT to assist with strengthening/mobility   - Keep Call bell within reach  - Keep bed low and locked with side rails adjusted as appropriate  - Keep care items and personal belongings within reach  - Initiate and maintain comfort rounds  - Make Fall Risk Sign visible to staff  - Offer Toileting every  Hours, in advance of need  - Initiate/Maintain alarm  - Obtain necessary fall risk management equipment:  - Apply yellow socks and bracelet for high fall risk patients  - Consider moving patient to room near nurses station  Outcome: Progressing     Problem: PAIN - ADULT  Goal: Verbalizes/displays adequate comfort level or baseline comfort level  Description: Interventions:  - Encourage patient to monitor pain and request assistance  - Assess pain using appropriate pain scale  - Administer analgesics based on type and severity of pain and evaluate response  - Implement non-pharmacological measures as appropriate and evaluate response  - Consider cultural and social influences on pain and pain management  - Notify physician/advanced practitioner if interventions unsuccessful or patient reports new pain  Outcome: Progressing     Problem: INFECTION - ADULT  Goal: Absence or prevention of progression during hospitalization  Description: INTERVENTIONS:  - Assess and monitor for signs and symptoms of infection  - Monitor lab/diagnostic results  - Monitor all insertion sites, i e  indwelling lines, tubes, and drains  - Monitor endotracheal if appropriate and nasal secretions for changes in amount and color  - Jeannette appropriate cooling/warming therapies per order  - Administer medications as ordered  - Instruct and encourage patient and family to use good hand hygiene technique  - Identify and instruct in appropriate isolation precautions for identified infection/condition  Outcome: Progressing  Goal: Absence of fever/infection during neutropenic period  Description: INTERVENTIONS:  - Monitor WBC    Outcome: Progressing     Problem: SAFETY ADULT  Goal: Patient will remain free of falls  Description: INTERVENTIONS:  - Educate patient/family on patient safety including physical limitations  - Instruct patient to call for assistance with activity   - Consult OT/PT to assist with strengthening/mobility   - Keep Call bell within reach  - Keep bed low and locked with side rails adjusted as appropriate  - Keep care items and personal belongings within reach  - Initiate and maintain comfort rounds  - Make Fall Risk Sign visible to staff  - Offer Toileting every  Hours, in advance of need  - Initiate/Maintain alarm  - Obtain necessary fall risk management equipment:  - Apply yellow socks and bracelet for high fall risk patients  - Consider moving patient to room near nurses station  Outcome: Progressing  Goal: Maintain or return to baseline ADL function  Description: INTERVENTIONS:  -  Assess patient's ability to carry out ADLs; assess patient's baseline for ADL function and identify physical deficits which impact ability to perform ADLs (bathing, care of mouth/teeth, toileting, grooming, dressing, etc )  - Assess/evaluate cause of self-care deficits   - Assess range of motion  - Assess patient's mobility; develop plan if impaired  - Assess patient's need for assistive devices and provide as appropriate  - Encourage maximum independence but intervene and supervise when necessary  - Involve family in performance of ADLs  - Assess for home care needs following discharge   - Consider OT consult to assist with ADL evaluation and planning for discharge  - Provide patient education as appropriate  Outcome: Progressing  Goal: Maintains/Returns to pre admission functional level  Description: INTERVENTIONS:  - Perform BMAT or MOVE assessment daily    - Set and communicate daily mobility goal to care team and patient/family/caregiver  - Collaborate with rehabilitation services on mobility goals if consulted  - Perform Range of Motion  times a day  - Reposition patient every  hours  - Dangle patient  times a day  - Stand patient  times a day  - Ambulate patient  times a day  - Out of bed to chair  times a day   - Out of bed for meals times a day  - Out of bed for toileting  - Record patient progress and toleration of activity level   Outcome: Progressing     Problem: DISCHARGE PLANNING  Goal: Discharge to home or other facility with appropriate resources  Description: INTERVENTIONS:  - Identify barriers to discharge w/patient and caregiver  - Arrange for needed discharge resources and transportation as appropriate  - Identify discharge learning needs (meds, wound care, etc )  - Arrange for interpretive services to assist at discharge as needed  - Refer to Case Management Department for coordinating discharge planning if the patient needs post-hospital services based on physician/advanced practitioner order or complex needs related to functional status, cognitive ability, or social support system  Outcome: Progressing     Problem: Knowledge Deficit  Goal: Patient/family/caregiver demonstrates understanding of disease process, treatment plan, medications, and discharge instructions  Description: Complete learning assessment and assess knowledge base    Interventions:  - Provide teaching at level of understanding  - Provide teaching via preferred learning methods  Outcome: Progressing     Problem: RESPIRATORY - ADULT  Goal: Achieves optimal ventilation and oxygenation  Description: INTERVENTIONS:  - Assess for changes in respiratory status  - Assess for changes in mentation and behavior  - Position to facilitate oxygenation and minimize respiratory effort  - Oxygen administered by appropriate delivery if ordered  - Initiate smoking cessation education as indicated  - Encourage broncho-pulmonary hygiene including cough, deep breathe, Incentive Spirometry  - Assess the need for suctioning and aspirate as needed  - Assess and instruct to report SOB or any respiratory difficulty  - Respiratory Therapy support as indicated  Outcome: Progressing     Problem: METABOLIC, FLUID AND ELECTROLYTES - ADULT  Goal: Electrolytes maintained within normal limits  Description: INTERVENTIONS:  - Monitor labs and assess patient for signs and symptoms of electrolyte imbalances  - Administer electrolyte replacement as ordered  - Monitor response to electrolyte replacements, including repeat lab results as appropriate  - Instruct patient on fluid and nutrition as appropriate  Outcome: Progressing  Goal: Fluid balance maintained  Description: INTERVENTIONS:  - Monitor labs   - Monitor I/O and WT  - Instruct patient on fluid and nutrition as appropriate  - Assess for signs & symptoms of volume excess or deficit  Outcome: Progressing     Problem: SKIN/TISSUE INTEGRITY - ADULT  Goal: Skin Integrity remains intact(Skin Breakdown Prevention)  Description: Assess:  -Perform Tay assessment every x  -Clean and moisturize skin every x  -Inspect skin when repositioning, toileting, and assisting with ADLS  -Assess under medical devices such as x every x  -Assess extremities for adequate circulation and sensation     Bed Management:  -Have minimal linens on bed & keep smooth, unwrinkled  -Change linens as needed when moist or perspiring  -Avoid sitting or lying in one position for more than x hours while in bed  -Keep HOB at [facility]     Toileting:  -Offer bedside commode  -Assess for incontinence every x  -Use incontinent care products after each incontinent episode such as x    Activity:  -Mobilize patient x times a day  -Encourage activity and walks on unit  -Encourage or provide ROM exercises   -Turn and reposition patient every x Hours  -Use appropriate equipment to lift or move patient in bed  -Instruct/ Assist with weight shifting every x when out of bed in chair  -Consider limitation of chair time x hour intervals    Skin Care:  -Avoid use of baby powder, tape, friction and shearing, hot water or constrictive clothing  -Relieve pressure over bony prominences using x  -Do not massage red bony areas    Next Steps:  -Teach patient strategies to minimize risks such as x   -Consider consults to  interdisciplinary teams such as x  Outcome: Progressing     Problem: HEMATOLOGIC - ADULT  Goal: Maintains hematologic stability  Description: INTERVENTIONS  - Assess for signs and symptoms of bleeding or hemorrhage  - Monitor labs  - Administer supportive blood products/factors as ordered and appropriate  Outcome: Progressing     Problem: MUSCULOSKELETAL - ADULT  Goal: Maintain or return mobility to safest level of function  Description: INTERVENTIONS:  - Assess patient's ability to carry out ADLs; assess patient's baseline for ADL function and identify physical deficits which impact ability to perform ADLs (bathing, care of mouth/teeth, toileting, grooming, dressing, etc )  - Assess/evaluate cause of self-care deficits   - Assess range of motion  - Assess patient's mobility  - Assess patient's need for assistive devices and provide as appropriate  - Encourage maximum independence but intervene and supervise when necessary  - Involve family in performance of ADLs  - Assess for home care needs following discharge   - Consider OT consult to assist with ADL evaluation and planning for discharge  - Provide patient education as appropriate  Outcome: Progressing     Problem: Nutrition/Hydration-ADULT  Goal: Nutrient/Hydration intake appropriate for improving, restoring or maintaining nutritional needs  Description: Monitor and assess patient's nutrition/hydration status for malnutrition  Collaborate with interdisciplinary team and initiate plan and interventions as ordered    Monitor patient's weight and dietary intake as ordered or per policy  Utilize nutrition screening tool and intervene as necessary  Determine patient's food preferences and provide high-protein, high-caloric foods as appropriate       INTERVENTIONS:  - Monitor oral intake, urinary output, labs, and treatment plans  - Assess nutrition and hydration status and recommend course of action  - Evaluate amount of meals eaten  - Assist patient with eating if necessary   - Allow adequate time for meals  - Recommend/ encourage appropriate diets, oral nutritional supplements, and vitamin/mineral supplements  - Order, calculate, and assess calorie counts as needed  - Recommend, monitor, and adjust tube feedings and TPN/PPN based on assessed needs  - Assess need for intravenous fluids  - Provide specific nutrition/hydration education as appropriate  - Include patient/family/caregiver in decisions related to nutrition  Outcome: Progressing     Problem: Prexisting or High Potential for Compromised Skin Integrity  Goal: Skin integrity is maintained or improved  Description: INTERVENTIONS:  - Identify patients at risk for skin breakdown  - Assess and monitor skin integrity  - Assess and monitor nutrition and hydration status  - Monitor labs   - Assess for incontinence   - Turn and reposition patient  - Assist with mobility/ambulation  - Relieve pressure over bony prominences  - Avoid friction and shearing  - Provide appropriate hygiene as needed including keeping skin clean and dry  - Evaluate need for skin moisturizer/barrier cream  - Collaborate with interdisciplinary team   - Patient/family teaching  - Consider wound care consult   Outcome: Progressing     Problem: MOBILITY - ADULT  Goal: Maintain or return to baseline ADL function  Description: INTERVENTIONS:  -  Assess patient's ability to carry out ADLs; assess patient's baseline for ADL function and identify physical deficits which impact ability to perform ADLs (bathing, care of mouth/teeth, toileting, grooming, dressing, etc )  - Assess/evaluate cause of self-care deficits   - Assess range of motion  - Assess patient's mobility; develop plan if impaired  - Assess patient's need for assistive devices and provide as appropriate  - Encourage maximum independence but intervene and supervise when necessary  - Involve family in performance of ADLs  - Assess for home care needs following discharge   - Consider OT consult to assist with ADL evaluation and planning for discharge  - Provide patient education as appropriate  Outcome: Progressing  Goal: Maintains/Returns to pre admission functional level  Description: INTERVENTIONS:  - Perform BMAT or MOVE assessment daily    - Set and communicate daily mobility goal to care team and patient/family/caregiver  - Collaborate with rehabilitation services on mobility goals if consulted  - Perform Range of Motion  times a day  - Reposition patient every  hours    - Dangle patient  times a day  - Stand patient  times a day  - Ambulate patient  times a day  - Out of bed to chair  times a day   - Out of bed for meals times a day  - Out of bed for toileting  - Record patient progress and toleration of activity level   Outcome: Progressing

## 2023-03-29 NOTE — DISCHARGE INSTR - AVS FIRST PAGE
Please start Levaquin 750 mg tablets by mouth this evening and take once daily around the same time each day until April 3  Please follow-up with pulmonology as an outpatient    Please hold all your immunologic's and dexamethasone until cleared by pulmonology and hematologist

## 2023-03-30 LAB
ANA SER QL IA: NEGATIVE
GALACTOMANNAN AG SPEC IA-ACNC: 0.13 INDEX (ref 0–0.49)
IGG SERPL-MCNC: 470 MG/DL (ref 603–1613)
IGG1 SER-MCNC: 233 MG/DL (ref 248–810)
IGG2 SER-MCNC: 145 MG/DL (ref 130–555)
IGG3 SER-MCNC: 10 MG/DL (ref 15–102)
IGG4 SER-MCNC: 14 MG/DL (ref 2–96)
PROTEINASE3 AB SER IA-ACNC: <0.2 UNITS (ref 0–0.9)

## 2023-03-30 NOTE — UTILIZATION REVIEW
NOTIFICATION OF ADMISSION DISCHARGE   This is a Notification of Discharge from [address] [place] [address]  Please be advised that this patient has been discharge from our facility  Below you will find the admission and discharge date and time including the patient’s disposition  UTILIZATION REVIEW CONTACT:  Blima Fabry  Utilization   Network Utilization Review Department  Phone: [phone] carefully listen to the prompts  All voicemails are confidential   Email: [email or web address]     ADMISSION INFORMATION  PRESENTATION DATE: 3/23/2023  7:15 AM  OBERVATION ADMISSION DATE:   INPATIENT ADMISSION DATE: 3/23/23 10:09 AM   DISCHARGE DATE: 3/29/2023  2:32 PM   DISPOSITION:Home/Self Care    IMPORTANT INFORMATION:  Send all requests for admission clinical reviews, approved or denied determinations and any other requests to dedicated fax number below belonging to the campus where the patient is receiving treatment   List of dedicated fax numbers:  [address] DENIALS (Administrative/Medical Necessity) [phone]   [address] (Maternity/NICU/Pediatrics) [phone]   [facility] [phone]   [place] [address] [phone]   [address] [phone]   220 [facility] [phone]   [address] [place] [address] Road [phone]   [address] [place] [address] 119 [phone]   [facility]  [phone]   [address] [facility] [phone]   412 [facility] [address] [facility] [phone]

## 2023-03-31 DIAGNOSIS — R07.9 CHEST PAIN: ICD-10-CM

## 2023-03-31 DIAGNOSIS — D50.9 IRON DEFICIENCY ANEMIA, UNSPECIFIED IRON DEFICIENCY ANEMIA TYPE: ICD-10-CM

## 2023-03-31 LAB
C-ANCA TITR SER IF: NORMAL TITER
MYELOPEROXIDASE AB SER IA-ACNC: <0.2 UNITS (ref 0–0.9)
MYELOPEROXIDASE AB SER IA-ACNC: <0.2 UNITS (ref 0–0.9)
P JIROVECII AG SPEC QL IF: NORMAL
P-ANCA ATYPICAL TITR SER IF: NORMAL TITER
P-ANCA TITR SER IF: NORMAL TITER
PROTEINASE3 AB SER IA-ACNC: <0.2 UNITS (ref 0–0.9)

## 2023-03-31 RX ORDER — FUROSEMIDE 20 MG/1
TABLET ORAL
Qty: 180 TABLET | Refills: 0 | Status: SHIPPED | OUTPATIENT
Start: 2023-03-31

## 2023-03-31 RX ORDER — FERROUS SULFATE TAB EC 324 MG (65 MG FE EQUIVALENT) 324 (65 FE) MG
TABLET DELAYED RESPONSE ORAL
Qty: 90 TABLET | Refills: 0 | Status: SHIPPED | OUTPATIENT
Start: 2023-03-31

## 2023-04-03 LAB — FUNGUS SPEC CULT: NORMAL

## 2023-04-04 LAB
MYCOBACTERIUM SPEC CULT: NORMAL
RHODAMINE-AURAMINE STN SPEC: NORMAL

## 2023-04-04 NOTE — TELEPHONE ENCOUNTER
Caller: patient   Doctor/office: Nena Whitaker  #: [phone]    % of improvement: 80  Pain Scale (1-10):  1/10 (discomfort not pain)

## 2023-04-06 ENCOUNTER — DOCUMENTATION (OUTPATIENT)
Dept: PULMONOLOGY | Facility: HOSPITAL | Age: 78
End: 2023-04-06

## 2023-04-06 LAB — SCAN RESULT: NORMAL

## 2023-04-06 NOTE — PROGRESS NOTES
Patient showed up in [facility] for his appointment and did not want to drive to [facility]  Requested I message you that if you have any concerns regarding testing that was done to please contact him since the next available appointment is with Dr Mona Wagner on May 2nd

## 2023-04-08 PROBLEM — R50.9 FEVER: Status: RESOLVED | Noted: 2023-02-04 | Resolved: 2023-04-08

## 2023-04-26 NOTE — TELEPHONE ENCOUNTER
Spoke with patient states that pain is 3-4/10 and that he feels like he has at this point 70-80% relief

## 2023-05-01 LAB — FUNGUS SPEC CULT: NORMAL

## 2023-05-02 ENCOUNTER — HOSPITAL ENCOUNTER (OUTPATIENT)
Dept: RADIOLOGY | Facility: HOSPITAL | Age: 78
Discharge: HOME | End: 2023-05-02

## 2023-05-02 ENCOUNTER — OFFICE VISIT (OUTPATIENT)
Dept: PULMONOLOGY | Facility: HOSPITAL | Age: 78
End: 2023-05-02

## 2023-05-02 VITALS
RESPIRATION RATE: 13 BRPM | WEIGHT: 158.4 LBS | BODY MASS INDEX: 22.68 KG/M2 | HEART RATE: 78 BPM | HEIGHT: 70 IN | SYSTOLIC BLOOD PRESSURE: 110 MMHG | TEMPERATURE: 98 F | OXYGEN SATURATION: 96 % | DIASTOLIC BLOOD PRESSURE: 50 MMHG

## 2023-05-02 DIAGNOSIS — J18.9 PNEUMONIA OF RIGHT LOWER LOBE DUE TO INFECTIOUS ORGANISM: Primary | ICD-10-CM

## 2023-05-02 DIAGNOSIS — R06.89 ACUTE RESPIRATORY INSUFFICIENCY: ICD-10-CM

## 2023-05-02 DIAGNOSIS — R50.9 FEVER, UNSPECIFIED FEVER CAUSE: ICD-10-CM

## 2023-05-02 LAB
ALBUMIN SERPL-MCNC: 4.2 G/DL (ref 3.7–4.7)
ALBUMIN/CREAT UR: 23 MG/G CREAT (ref 0–29)
ALBUMIN/GLOB SERPL: 1.7 {RATIO} (ref 1.2–2.2)
ALP SERPL-CCNC: 107 IU/L (ref 44–121)
ALT SERPL-CCNC: 13 IU/L (ref 0–44)
AST SERPL-CCNC: 17 IU/L (ref 0–40)
BILIRUB SERPL-MCNC: 0.4 MG/DL (ref 0–1.2)
BUN SERPL-MCNC: 15 MG/DL (ref 8–27)
BUN/CREAT SERPL: 14 (ref 10–24)
CALCIUM SERPL-MCNC: 9.6 MG/DL (ref 8.6–10.2)
CHLORIDE SERPL-SCNC: 99 MMOL/L (ref 96–106)
CHOLEST SERPL-MCNC: 119 MG/DL (ref 100–199)
CHOLEST/HDLC SERPL: 3.5 RATIO (ref 0–5)
CO2 SERPL-SCNC: 27 MMOL/L (ref 20–29)
CREAT SERPL-MCNC: 1.06 MG/DL (ref 0.76–1.27)
CREAT UR-MCNC: 76.3 MG/DL
EGFR: 73 ML/MIN/1.73
EST. AVERAGE GLUCOSE BLD GHB EST-MCNC: 131 MG/DL
GLOBULIN SER-MCNC: 2.5 G/DL (ref 1.5–4.5)
GLUCOSE SERPL-MCNC: 130 MG/DL (ref 70–99)
HBA1C MFR BLD: 6.2 % (ref 4.8–5.6)
HDLC SERPL-MCNC: 34 MG/DL
LDLC SERPL CALC-MCNC: 61 MG/DL (ref 0–99)
MICROALBUMIN UR-MCNC: 17.2 UG/ML
MYCOBACTERIUM SPEC CULT: NORMAL
POTASSIUM SERPL-SCNC: 3.5 MMOL/L (ref 3.5–5.2)
PROT SERPL-MCNC: 6.7 G/DL (ref 6–8.5)
RHODAMINE-AURAMINE STN SPEC: NORMAL
SL AMB VLDL CHOLESTEROL CALC: 24 MG/DL (ref 5–40)
SODIUM SERPL-SCNC: 144 MMOL/L (ref 134–144)
TRIGL SERPL-MCNC: 135 MG/DL (ref 0–149)

## 2023-05-02 NOTE — ASSESSMENT & PLAN NOTE
He has clinically recovered from pneumonia  We discussed bronchoscopy was negative  He is at risk for recurrent pneumonia in the setting of immunosuppression/chemotherapy  We will have him get repeat chest x-ray today to document complete resolution of previous infiltrates

## 2023-05-02 NOTE — PROGRESS NOTES
"Assessment:    Pneumonia of right lower lobe due to infectious organism  He has clinically recovered from pneumonia  We discussed bronchoscopy was negative  He is at risk for recurrent pneumonia in the setting of immunosuppression/chemotherapy  We will have him get repeat chest x-ray today to document complete resolution of previous infiltrates    Addendum - 5/2 - CXR - no focal infiltrates    Plan:    Diagnoses and all orders for this visit:    Pneumonia of right lower lobe due to infectious organism    Acute respiratory insufficiency  -     Ambulatory referral to Pulmonology        Follow-up: As needed -I will call him with chest x-ray results      HPI:  60-year-old gentleman previously followed with pulmonary secondary to abnormal imaging with recurrent pneumonia  Last seen in our office 15 months ago  He was then hospitalized again 6 weeks ago and had bronchoscopy at that time  He was treated with cefepime and vancomycin  No cough or dyspnea or fevers  He \"spits up\" phlegm every day  Yellow in color  He had radiation to his eyelid and he has a runny nose  He has a history of multiple myeloma and follows with [facility] oncology on chemotherapy  He is in remission - they are monitoring and have him on maintenance chemo  Review of Systems   Constitutional: Negative for activity change and fever  Respiratory: Negative for cough and shortness of breath  Gastrointestinal: Negative for nausea and vomiting  Skin: Negative for rash         The following portions of the patient's history were reviewed and updated as appropriate: allergies, current medications, past family history, past medical history, past social history, past surgical history and problem list     VITALS:  Vitals:    05/02/23 1422 05/02/23 1423   BP: 110/50    BP Location: Left arm    Patient Position: Sitting    Cuff Size: Standard    Pulse: 78    Resp: 13    Temp: 98 °F (36 7 °C)    TempSrc: Tympanic    SpO2: 96% 96%   Weight: " "71 8 kg (158 lb 6 4 oz)    Height: 5' 10\" (1 778 m)        Physical Exam  General:  Patient is awake, alert, non-toxic and in no acute respiratory distress  Neck: No JVD  CV:  Regular, +S1 and S2, +YOLETTE, No gallops or rubs appreciated  Lungs: Clear to auscultation bilateral without wheeze, rales or rhonchi  Abdomen: Soft, +BS, Non-tender, non-distended  Extremities: No clubbing, cyanosis or edema  Neuro: No focal deficits        Diagnostic Testing:    Bronchoscopy 3/27/2023:  PCP negative  Legionella negative  Bronchial culture and Gram stain no growth  Viral culture: No virus isolated  Fungal culture: No fungus isolated  AFB culture: No growth      CBC:  Lab Results   Component Value Date    WBC 8 70 03/29/2023    HGB 10 9 (L) 03/29/2023    HCT 33 7 (L) 03/29/2023    MCV 89 03/29/2023     03/29/2023         BMP:   Lab Results   Component Value Date     05/19/2017    K 3 4 (L) 03/29/2023     03/29/2023    CO2 30 03/29/2023    ANIONGAP 7 02/22/2014    BUN 12 03/29/2023    CREATININE 0 75 03/29/2023    GLUCOSE 204 (H) 10/13/2017    GLUF 167 (H) 08/05/2016    CALCIUM 8 9 03/29/2023    CORRECTEDCA 9 0 03/24/2023    AST 12 (L) 03/24/2023    ALT 7 03/24/2023    ALKPHOS 70 03/24/2023    PROT 6 5 05/19/2017    BILITOT 0 9 05/19/2017    EGFR 89 03/29/2023         Imaging studies:  I have personally reviewed pertinent reports  and I have personally reviewed pertinent films in PACS  Ct chest 3/23/23  IMPRESSION:     No pulmonary embolus      Severe multifocal right pneumonia      Moderate interstitial edema      Stable small loculated left and trace right pleural effusion        Lory Winters,   "

## 2023-05-03 ENCOUNTER — APPOINTMENT (RX ONLY)
Dept: URBAN - METROPOLITAN AREA CLINIC 26 | Facility: CLINIC | Age: 78
Setting detail: DERMATOLOGY
End: 2023-05-03

## 2023-05-03 PROBLEM — D04.39 CARCINOMA IN SITU OF SKIN OF OTHER PARTS OF FACE: Status: ACTIVE | Noted: 2023-05-03

## 2023-05-03 PROBLEM — C44.329 SQUAMOUS CELL CARCINOMA OF SKIN OF OTHER PARTS OF FACE: Status: ACTIVE | Noted: 2023-05-03

## 2023-05-03 PROCEDURE — 14301 TIS TRNFR ANY 30.1-60 SQ CM: CPT

## 2023-05-03 PROCEDURE — 17311 MOHS 1 STAGE H/N/HF/G: CPT | Mod: 76

## 2023-05-03 PROCEDURE — ? MOHS SURGERY

## 2023-05-03 PROCEDURE — 17311 MOHS 1 STAGE H/N/HF/G: CPT

## 2023-05-03 NOTE — PROCEDURE: MOHS SURGERY
Burkitts lymphoma Burkitts lymphoma Burkitts lymphoma Intermediate Repair And Flap Additional Text (Will Appearing After The Standard Complex Repair Text): The intermediate repair was not sufficient to completely close the primary defect. The remaining additional defect was repaired with the flap mentioned below.

## 2023-05-03 NOTE — PROCEDURE: MIPS QUALITY
Quality 111:Pneumonia Vaccination Status For Older Adults: Patient received any pneumococcal conjugate or polysaccharide vaccine on or after their 60th birthday and before the end of the measurement period
Quality 431: Preventive Care And Screening: Unhealthy Alcohol Use - Screening: Patient not identified as an unhealthy alcohol user when screened for unhealthy alcohol use using a systematic screening method
Quality 110: Preventive Care And Screening: Influenza Immunization: Influenza Immunization previously received during influenza season
Quality 226: Preventive Care And Screening: Tobacco Use: Screening And Cessation Intervention: Patient screened for tobacco use and is an ex/non-smoker
Quality 47: Advance Care Plan: Advance Care Planning discussed and documented in the medical record; patient did not wish or was not able to name a surrogate decision maker or provide an advance care plan.
Detail Level: Detailed

## 2023-05-05 ENCOUNTER — TELEPHONE (OUTPATIENT)
Dept: PAIN MEDICINE | Facility: CLINIC | Age: 78
End: 2023-05-05

## 2023-05-05 NOTE — TELEPHONE ENCOUNTER
Caller:[employer]    Doctor/Office: Dr Jude Anna    Call regarding :  Prior auth for procedure      Call was transferred to: DR ALEKSANDRA BONILLA

## 2023-05-15 LAB
MYCOBACTERIUM SPEC CULT: NORMAL
RHODAMINE-AURAMINE STN SPEC: NORMAL

## 2023-05-16 ENCOUNTER — OFFICE VISIT (OUTPATIENT)
Dept: FAMILY MEDICINE CLINIC | Facility: HOSPITAL | Age: 78
End: 2023-05-16

## 2023-05-16 VITALS
WEIGHT: 159.4 LBS | TEMPERATURE: 97.1 F | HEIGHT: 70 IN | SYSTOLIC BLOOD PRESSURE: 96 MMHG | HEART RATE: 72 BPM | DIASTOLIC BLOOD PRESSURE: 58 MMHG | BODY MASS INDEX: 22.82 KG/M2

## 2023-05-16 DIAGNOSIS — R06.89 ACUTE RESPIRATORY INSUFFICIENCY: ICD-10-CM

## 2023-05-16 DIAGNOSIS — E11.9 TYPE 2 DIABETES MELLITUS WITHOUT COMPLICATION, WITHOUT LONG-TERM CURRENT USE OF INSULIN (HCC): Chronic | ICD-10-CM

## 2023-05-16 DIAGNOSIS — J18.9 PNEUMONIA OF RIGHT LOWER LOBE DUE TO INFECTIOUS ORGANISM: Primary | ICD-10-CM

## 2023-05-16 DIAGNOSIS — Z79.52 LONG TERM CURRENT USE OF SYSTEMIC STEROIDS: ICD-10-CM

## 2023-05-16 DIAGNOSIS — I48.0 PAROXYSMAL ATRIAL FIBRILLATION (HCC): Chronic | ICD-10-CM

## 2023-05-16 DIAGNOSIS — Z00.00 MEDICARE ANNUAL WELLNESS VISIT, SUBSEQUENT: ICD-10-CM

## 2023-05-16 DIAGNOSIS — I50.32 CHRONIC DIASTOLIC CONGESTIVE HEART FAILURE (HCC): ICD-10-CM

## 2023-05-16 DIAGNOSIS — I25.119 CORONARY ARTERY DISEASE INVOLVING NATIVE HEART WITH ANGINA PECTORIS, UNSPECIFIED VESSEL OR LESION TYPE (HCC): ICD-10-CM

## 2023-05-16 RX ORDER — DEXAMETHASONE 4 MG/1
TABLET ORAL
COMMUNITY
Start: 2023-05-12

## 2023-05-16 RX ORDER — MONTELUKAST SODIUM 10 MG/1
10 TABLET ORAL DAILY
COMMUNITY
Start: 2023-04-09

## 2023-05-16 NOTE — ASSESSMENT & PLAN NOTE
Wt Readings from Last 3 Encounters:   05/16/23 72 3 kg (159 lb 6 4 oz)   05/02/23 71 8 kg (158 lb 6 4 oz)   04/11/23 73 kg (161 lb)     No current s/sx of decompensation, on ASA/Lasix/beta blocker/statin, call with symptoms

## 2023-05-16 NOTE — ASSESSMENT & PLAN NOTE
No current CV symptoms, saw Cardio (Dr Umesh Serna) in April, on beta blocker/ASA and statin, call with CV symptoms

## 2023-05-16 NOTE — PROGRESS NOTES
Assessment and Plan:     Problem List Items Addressed This Visit        Endocrine    Type 2 diabetes mellitus without complication, without long-term current use of insulin (HCC) (Chronic)       Lab Results   Component Value Date    HGBA1C 6 2 (H) 05/01/2023   DM type 2 without complications - controlled with A1C 6 2 - con't meds and labs and f/u as per ROSALINA Vang on DM foot exam (3/23) and eye exam (3/22 - 2yrs), he is on a statin and ASA but no ACE/ARB, will follow            Respiratory    Pneumonia of right lower lobe due to infectious organism - Primary    Relevant Medications    montelukast (SINGULAIR) 10 mg tablet    RESOLVED: Acute respiratory insufficiency       Cardiovascular and Mediastinum    Paroxysmal atrial fibrillation (HCC) (Chronic)     Currently NSR and rate controlled with Metoprolol, on ASA for stroke prevention, call with CV/Neuro symptoms         Coronary artery disease involving native heart with angina pectoris, unspecified vessel or lesion type (HCC)     No current CV symptoms, saw Cardio (Dr Tory Evans) in April, on beta blocker/ASA and statin, call with CV symptoms         Chronic diastolic congestive heart failure ([facility] [address] )     Wt Readings from Last 3 Encounters:   05/16/23 72 3 kg (159 lb 6 4 oz)   05/02/23 71 8 kg (158 lb 6 4 oz)   04/11/23 73 kg (161 lb)     No current s/sx of decompensation, on ASA/Lasix/beta blocker/statin, call with symptoms            Other Visit Diagnoses     Medicare annual wellness visit, subsequent        Long term current use of systemic steroids        Dexa order given    Relevant Orders    DXA bone density spine hip and pelvis          Depression Screening and Follow-up Plan: Patient was screened for depression during today's encounter  They screened negative with a PHQ-2 score of 0  Preventive health issues were discussed with patient, and age appropriate screening tests were ordered as noted in patient's After Visit Summary      Colonoscopy 1/21 - 5 yrs    DM "labs 5/23 (A1C 6 2)  DM foot exam 3/23  DM eye exam 3/22 - 2 yrs      Personalized health advice and appropriate referrals for health education or preventive services given if needed, as noted in patient's After Visit Summary  History of Present Illness:     Patient presents for a Medicare Wellness Visit    HPI Pt here for follow up appt and AWV    Pt had a hospitalization in March 23 for pneumonia and acute resp failure  He had a bronchoscopy and received IV abx  Cytology from bronch was negative for malignancy  He saw Pulm (Dr Truman Moreno) for f/u 5/2/23 - OV note reviewed  A repeat CXR was done at that time and no focal infiltrates were noted  He was told he could f/u with pulm prn  He is feeling better \"slowly\"  He notes no F/C/cough and has no phelgm productive  He notes no SOB at this  Pt saw Cardio (Dr Griffin Kincaid) in April 23 for f/u CAD/CHF/PAF - OV note reviewed  He had no meds changed  He notes CP/palp/LE edema/SOB/orthopnea/PND  Pt con't to follow with Endo (Dr Rudolph Burnham) for his DM - OV note from March 23 was reviewed  He was started on Jardiance at that time  He gets dexamethasone once a week when he gets chemo tx - day of the steroids it spikes up to 230's but by the next day it is back down  He had his DM foot exam at that visit 3/23 and his DM eye exam was 3/22 - 2 yrs  He is on a statin and ASA but no ACE/ARB  His labs from 5/1/23 were reviewed: FBS/A1C 130/6 2, rest of CMP/urine microalbumin:Cr ratio were wnl, FLP with HDL low at 34 but TC/LDL at goal       Patient Care Team:  Monster Browne DO as PCP - General (Internal Medicine)  Charline Davidson MD as PCP - Endocrinology (Endocrinology)  MD Susan Barlow MD (Cardiology)  Donna Marquis as Nurse Practitioner (Gastroenterology)  Merlin Nava MD (Hematology and Oncology)     Review of Systems:     Review of Systems   Constitutional: Negative for chills, fever and unexpected weight change   " HENT: Negative for congestion and sore throat  Eyes: Positive for visual disturbance  Negative for pain  Respiratory: Negative for cough, shortness of breath and wheezing  Cardiovascular: Negative for chest pain, palpitations and leg swelling  Gastrointestinal: Negative for abdominal pain, blood in stool, constipation, diarrhea, nausea and vomiting  Genitourinary: Negative for difficulty urinating and dysuria  Musculoskeletal: Positive for arthralgias  Negative for myalgias  Skin: Negative for rash and wound  Neurological: Negative for dizziness and headaches  Hematological: Bruises/bleeds easily  Psychiatric/Behavioral: Positive for sleep disturbance  Negative for confusion and dysphoric mood          Problem List:     Patient Active Problem List   Diagnosis   • Basal cell carcinoma of skin   • Coronary artery disease involving native heart with angina pectoris, unspecified vessel or lesion type ([place] [address] )   • Type 2 diabetes mellitus without complication, without long-term current use of insulin ([facility])   • Dyslipidemia   • Erectile dysfunction   • Fatty liver   • Lymphoma ([facility] )   • Malignant tumor of cecum ([place] [address] )   • Multiple myeloma not having achieved remission ([place] [address] )   • Primary localized osteoarthritis of right knee   • Hx of CABG   • Essential hypertension   • S/P total knee arthroplasty, left   • Chronic diastolic congestive heart failure (HCC)   • History of aortic valve replacement with bioprosthetic valve   • Leukocytosis   • Paroxysmal atrial fibrillation (HCC)   • Pharyngeal dysphagia   • Personal history of colon cancer   • Roberts's esophagus without dysplasia   • Schatzki's ring of distal esophagus   • Gastroesophageal reflux disease with esophagitis   • Iron deficiency anemia   • Mouth ulcers   • Chest pain   • Glossitis   • CVID (common variable immunodeficiency) ([place] [address] )   • Severe sepsis ([place] [address] )   • Foraminal stenosis of cervical region   • Cervical radiculopathy   • Pneumonia of right lower lobe due to infectious organism      Past Medical and Surgical History:     Past Medical History:   Diagnosis Date   • Acute respiratory failure with hypoxia ([facility] [address] ) 04/20/2022   • Arthritis    • Cancer [facility])    • Cataract    • Colitis    • Colon polyp    • Fatty liver    • History of chemotherapy    • History of radiation therapy    • History of shingles 2018   • History of transfusion    • Hyperlipidemia    • Hypertension      Past Surgical History:   Procedure Laterality Date   • AORTIC VALVE REPLACEMENT     • CARDIAC VALVE REPLACEMENT  2014    aorta   • CATARACT EXTRACTION Bilateral    • COLECTOMY     • COLONOSCOPY  11/2019    Prior right hemicolectomy   • CORONARY ARTERY BYPASS GRAFT     • DENTAL SURGERY     • JOINT REPLACEMENT  January 2019    left knee   • KNEE SURGERY     • LYMPH NODE BIOPSY  2003   • LYMPHADENECTOMY     • OTHER SURGICAL HISTORY      MAZE PROCEDURE   • MS ARTHRP KNE CONDYLE&PLATU MEDIAL&LAT COMPARTMENTS Left 01/28/2019    Procedure: ARTHROPLASTY LEFT KNEE TOTAL;  Surgeon: Francisca Patton MD;  Location:  MAIN OR;  Service: Orthopedics   • MS LARYNGOSCOPY DIRECT OPERATIVE W/BIOPSY Right 04/27/2022    Procedure: DIRECT LARYNGOSCOPY WITH BIOPSY RIGHT PHARYNX, POSSIBLE RIGHT NECK LYMPH NODE BIOPSY; FROZEN SECTION;  Surgeon: Chayo Kruger MD;  Location:  MAIN OR;  Service: ENT   • SKIN BIOPSY     • UPPER GASTROINTESTINAL ENDOSCOPY  11/2019    Schatzki ring   • US GUIDED LYMPH NODE BIOPSY RIGHT  12/14/2021      Family History:     Family History   Problem Relation Age of Onset   • Heart block Family    • Coronary artery disease Mother    • Thrombosis Mother    • Hypertension Mother    • Arthritis Mother    • Hyperlipidemia Mother    • Diabetes Father    • Hypertension Father    • Arthritis Father    • Sudden death Father         cardiac   • Diabetes type II Father    • Colon cancer Paternal Grandfather    • Cancer Paternal Grandfather    • Breast cancer Sister    • Heart disease Brother "    Coronary stents      Social History:     Social History     Socioeconomic History   • Marital status: /Civil Union     Spouse name: None   • Number of children: None   • Years of education: None   • Highest education level: None   Occupational History   • Occupation: WORKING FULLTIME    Tobacco Use   • Smoking status: Former     Packs/day: 1 00     Years: 42 00     Pack years: 42 00     Types: Cigarettes     Start date: 5     Quit date: 2009     Years since quittin 3   • Smokeless tobacco: Never   Vaping Use   • Vaping Use: Never used   Substance and Sexual Activity   • Alcohol use: Yes     Alcohol/week: 2 0 standard drinks     Types: 2 Cans of beer per week     Comment: very rare \"beer here and there\"   • Drug use: No   • Sexual activity: Not Currently     Partners: Female     Birth control/protection: None   Other Topics Concern   • None   Social History Narrative   • None     Social Determinants of Health     Financial Resource Strain: Low Risk    • Difficulty of Paying Living Expenses: Not hard at all   Food Insecurity: No Food Insecurity   • Worried About Running Out of Food in the Last Year: Never true   • Ran Out of Food in the Last Year: Never true   Transportation Needs: No Transportation Needs   • Lack of Transportation (Medical): No   • Lack of Transportation (Non-Medical):  No   Physical Activity: Not on file   Stress: Not on file   Social Connections: Not on file   Intimate Partner Violence: Not on file   Housing Stability: Low Risk    • Unable to Pay for Housing in the Last Year: No   • Number of Places Lived in the Last Year: 1   • Unstable Housing in the Last Year: No      Medications and Allergies:     Current Outpatient Medications   Medication Sig Dispense Refill   • aspirin 81 MG tablet Take 1 tablet (81 mg total) by mouth daily     • atorvastatin (LIPITOR) 40 mg tablet TAKE 1 TABLET BY MOUTH EVERY DAY 90 tablet 3   • cholecalciferol (VITAMIN D3) 1,000 units tablet Take 2 " tablets (2,000 Units total) by mouth daily 60 tablet 0   • dexamethasone (DECADRON) 4 mg tablet TAKE 5 TABLETS BY MOUTH ONCE A WEEK     • Empagliflozin 25 MG TABS Take 1 tablet (25 mg total) by mouth every morning 90 tablet 1   • ferrous sulfate 324 (65 Fe) mg TAKE 1 TABLET BY MOUTH DAILY WITH BREAKFAST 90 tablet 0   • furosemide (LASIX) 20 mg tablet TAKE 1 TABLET BY MOUTH TWICE A  tablet 0   • IMMUNE GLOBULIN, HUMAN, IV Inject 30,000 mg into a catheter in a vein     • metFORMIN (GLUCOPHAGE-XR) 500 mg 24 hr tablet Take 3 tablets (1,500 mg total) by mouth daily with breakfast Take 3 pills in morning 120 tablet 0   • metoprolol succinate (TOPROL-XL) 100 mg 24 hr tablet TAKE 1 TABLET BY MOUTH EVERY DAY 90 tablet 1   • montelukast (SINGULAIR) 10 mg tablet Take 10 mg by mouth daily     • multivitamin-minerals (CENTRUM ADULTS) tablet Take 1 tablet by mouth daily  0   • nitroglycerin (NITROSTAT) 0 3 mg SL tablet Nitrostat 0 3 mg sublingual tablet   Place 1 tablet as needed by sublingual route as needed for 90 days  • omeprazole (PriLOSEC) 40 MG capsule TAKE 1 CAPSULE (40 MG TOTAL) BY MOUTH DAILY  90 capsule 2   • valACYclovir (VALTREX) 500 mg tablet Take 500 mg by mouth daily       No current facility-administered medications for this visit  Allergies   Allergen Reactions   • Ceftin [Cefuroxime] Itching     However, has tolerated Cefazolin and Pip-Tazo since, which have different side chains  Be cautious with / avoid 2nd, 3rd, or 4th Gen Cephs that have similar side chains to Cefuroxime     • Lantus [Insulin Glargine] Itching      Immunizations:     Immunization History   Administered Date(s) Administered   • COVID-19 PFIZER VACCINE 0 3 ML IM 03/08/2021, 03/29/2021, 08/27/2021, 04/01/2022   • INFLUENZA 09/18/2012, 09/01/2013, 10/01/2015, 10/01/2015, 11/28/2016, 11/03/2017, 10/25/2018   • Influenza Split High Dose Preservative Free IM 09/18/2012, 10/07/2014, 09/30/2015, 11/28/2016, 11/03/2017   • Influenza, high dose seasonal 0 7 mL 09/23/2019, 09/11/2020, 09/21/2021   • Influenza, seasonal, injectable 11/01/2014   • Pneumococcal 01/01/2013   • Pneumococcal Conjugate 13-Valent 02/15/2016, 02/10/2017   • Pneumococcal Polysaccharide PPV23 10/01/2003, 09/18/2012   • Tdap 06/10/2014   • Varicella 01/01/2014   • Zoster 07/27/2014      Health Maintenance:         Topic Date Due   • Lung Cancer Screening  03/23/2024   • Hepatitis C Screening  Completed         Topic Date Due   • COVID-19 Vaccine (5 - Booster for [place] Jordy series) 05/27/2022      Medicare Screening Tests and Risk Assessments:     Nicolasa Kitchen is here for his Subsequent Wellness visit  Last Medicare Wellness visit information reviewed, patient interviewed and updates made to the record today  Health Risk Assessment:   Patient rates overall health as fair  Patient feels that their physical health rating is same  Patient is very satisfied with their life  Eyesight was rated as slightly worse  Hearing was rated as same  Patient feels that their emotional and mental health rating is same  Patients states they are never, rarely angry  Patient states they are sometimes unusually tired/fatigued  Pain experienced in the last 7 days has been some  Patient's pain rating has been 5/10  Patient states that he has experienced no weight loss or gain in last 6 months  Eyesight worse from last year - has eye appt with retinal specialist in June    Depression Screening:   PHQ-2 Score: 0      Fall Risk Screening: In the past year, patient has experienced: no history of falling in past year      Home Safety:  Patient does not have trouble with stairs inside or outside of their home  Patient has working smoke alarms and has working carbon monoxide detector  Home safety hazards include: none  Nutrition:   Current diet is Low Saturated Fat  Medications:   Patient is currently taking over-the-counter supplements  OTC medications include: Vitamin D   Patient is able to manage medications  Activities of Daily Living (ADLs)/Instrumental Activities of Daily Living (IADLs):   Walk and transfer into and out of bed and chair?: Yes  Dress and groom yourself?: Yes    Bathe or shower yourself?: Yes    Feed yourself? Yes  Do your laundry/housekeeping?: Yes  Manage your money, pay your bills and track your expenses?: Yes  Make your own meals?: Yes    Do your own shopping?: Yes    Previous Hospitalizations:   Any hospitalizations or ED visits within the last 12 months?: Yes    How many hospitalizations have you had in the last year?: 3-4    Advance Care Planning:   Living will: Yes    Durable POA for healthcare: Yes    Advanced directive: Yes      Cognitive Screening:   Provider or family/friend/caregiver concerned regarding cognition?: No    PREVENTIVE SCREENINGS      Cardiovascular Screening:    General: Screening Current, Risks and Benefits Discussed and History Lipid Disorder      Diabetes Screening:     General: History Diabetes, Risks and Benefits Discussed and Screening Current      Colorectal Cancer Screening:     General: History Colorectal Cancer and Risks and Benefits Discussed      Prostate Cancer Screening:    General: Screening Not Indicated and Risks and Benefits Discussed      Osteoporosis Screening:    General: Risks and Benefits Discussed    Due for: DXA Axial      Abdominal Aortic Aneurysm (AAA) Screening:    Risk factors include: tobacco use        General: Risks and Benefits Discussed      Lung Cancer Screening:     General: Screening Current and Risks and Benefits Discussed      Hepatitis C Screening:    General: Screening Current and Risks and Benefits Discussed    Screening, Brief Intervention, and Referral to Treatment (SBIRT)    Screening  Typical number of drinks in a day: 0  Typical number of drinks in a week: 2  Interpretation: Low risk drinking behavior      AUDIT-C Screenin) How often did you have a drink containing alcohol in the past year? 2 to 4 times a "month  2) How many drinks did you have on a typical day when you were drinking in the past year? 1 to 2  3) How often did you have 6 or more drinks on one occasion in the past year? less than monthly    AUDIT-C Score: 3  Interpretation: Score 0-3 (male): Negative screen for alcohol misuse    Single Item Drug Screening:  How often have you used an illegal drug (including marijuana) or a prescription medication for non-medical reasons in the past year? never    Single Item Drug Screen Score: 0  Interpretation: Negative screen for possible drug use disorder    Other Counseling Topics:   Car/seat belt/driving safety and regular weightbearing exercise  Vision Screening    Right eye Left eye Both eyes   Without correction 20/50 20/50 20/40   With correction           Physical Exam:     BP 96/58   Pulse 72   Temp (!) 97 1 °F (36 2 °C) (Tympanic)   Ht 5' 10\" (1 778 m)   Wt 72 3 kg (159 lb 6 4 oz)   BMI 22 87 kg/m²     Physical Exam  Vitals and nursing note reviewed  Constitutional:       General: He is not in acute distress  Appearance: He is well-developed  He is ill-appearing  Comments: Chronically ill appearing   HENT:      Head: Normocephalic and atraumatic  Right Ear: Tympanic membrane and external ear normal  There is no impacted cerumen  Left Ear: Tympanic membrane and external ear normal  There is no impacted cerumen  Eyes:      General:         Right eye: No discharge  Left eye: No discharge  Conjunctiva/sclera: Conjunctivae normal    Neck:      Trachea: No tracheal deviation  Cardiovascular:      Rate and Rhythm: Normal rate and regular rhythm  Heart sounds: Murmur heard  Pulmonary:      Effort: Pulmonary effort is normal  No respiratory distress  Breath sounds: Normal breath sounds  No wheezing  Abdominal:      General: There is no distension  Palpations: Abdomen is soft  Tenderness: There is no abdominal tenderness   There is no guarding or " rebound  Musculoskeletal:      Cervical back: Neck supple  Right lower leg: No edema  Left lower leg: No edema  Lymphadenopathy:      Cervical: No cervical adenopathy  Skin:     General: Skin is warm and dry  Coloration: Skin is not pale  Findings: No rash  Neurological:      General: No focal deficit present  Mental Status: He is alert  Motor: No abnormal muscle tone  Gait: Gait normal    Psychiatric:         Mood and Affect: Mood normal          Behavior: Behavior normal          Thought Content:  Thought content normal          Judgment: Judgment normal           Keila Nina DO

## 2023-05-16 NOTE — ASSESSMENT & PLAN NOTE
Currently NSR and rate controlled with Metoprolol, on ASA for stroke prevention, call with CV/Neuro symptoms

## 2023-05-16 NOTE — PATIENT INSTRUCTIONS
Medicare Preventive Visit Patient Instructions  Thank you for completing your Welcome to Medicare Visit or Medicare Annual Wellness Visit today  Your next wellness visit will be due in one year (5/16/2024)  The screening/preventive services that you may require over the next 5-10 years are detailed below  Some tests may not apply to you based off risk factors and/or age  Screening tests ordered at today's visit but not completed yet may show as past due  Also, please note that scanned in results may not display below  Preventive Screenings:  Service Recommendations Previous Testing/Comments   Colorectal Cancer Screening  · Colonoscopy    · Fecal Occult Blood Test (FOBT)/Fecal Immunochemical Test (FIT)  · Fecal DNA/Cologuard Test  · Flexible Sigmoidoscopy Age: 39-70 years old   Colonoscopy: every 10 years (May be performed more frequently if at higher risk)  OR  FOBT/FIT: every 1 year  OR  Cologuard: every 3 years  OR  Sigmoidoscopy: every 5 years  Screening may be recommended earlier than age 39 if at higher risk for colorectal cancer  Also, an individualized decision between you and your healthcare provider will decide whether screening between the ages of 74-80 would be appropriate   Colonoscopy: 01/06/2021  FOBT/FIT: Not on file  Cologuard: Not on file  Sigmoidoscopy: Not on file    History Colorectal Cancer     Prostate Cancer Screening Individualized decision between patient and health care provider in men between ages of 53-78   Medicare will cover every 12 months beginning on the day after your 50th birthday PSA: 1 4 ng/mL     Screening Not Indicated     Hepatitis C Screening Once for adults born between 1945 and 1965  More frequently in patients at high risk for Hepatitis C Hep C Antibody: 07/27/2022    Screening Current   Diabetes Screening 1-2 times per year if you're at risk for diabetes or have pre-diabetes Fasting glucose: No results in last 5 years (No results in last 5 years)  A1C: 6 2 % (5/1/2023)  Screening Not Indicated  History Diabetes   Cholesterol Screening Once every 5 years if you don't have a lipid disorder  May order more often based on risk factors  Lipid panel: 05/01/2023  Screening Current      Other Preventive Screenings Covered by Medicare:  1  Abdominal Aortic Aneurysm (AAA) Screening: covered once if your at risk  You're considered to be at risk if you have a family history of AAA or a male between the age of 73-68 who smoking at least 100 cigarettes in your lifetime  2  Lung Cancer Screening: covers low dose CT scan once per year if you meet all of the following conditions: (1) Age 50-69; (2) No signs or symptoms of lung cancer; (3) Current smoker or have quit smoking within the last 15 years; (4) You have a tobacco smoking history of at least 20 pack years (packs per day x number of years you smoked); (5) You get a written order from a healthcare provider  3  Glaucoma Screening: covered annually if you're considered high risk: (1) You have diabetes OR (2) Family history of glaucoma OR (3)  aged 48 and older OR (3)  American aged 72 and older  3  Osteoporosis Screening: covered every 2 years if you meet one of the following conditions: (1) Have a vertebral abnormality; (2) On glucocorticoid therapy for more than 3 months; (3) Have primary hyperparathyroidism; (4) On osteoporosis medications and need to assess response to drug therapy  5  HIV Screening: covered annually if you're between the age of 12-76  Also covered annually if you are younger than 13 and older than 72 with risk factors for HIV infection  For pregnant patients, it is covered up to 3 times per pregnancy      Immunizations:  Immunization Recommendations   Influenza Vaccine Annual influenza vaccination during flu season is recommended for all persons aged >= 6 months who do not have contraindications   Pneumococcal Vaccine   * Pneumococcal conjugate vaccine = PCV13 (Prevnar 13), PCV15 (Vaxneuvance), PCV20 (Prevnar 20)  * Pneumococcal polysaccharide vaccine = PPSV23 (Pneumovax) Adults 2364 years old: 1-3 doses may be recommended based on certain risk factors  Adults 72 years old: 1-2 doses may be recommended based off what pneumonia vaccine you previously received   Hepatitis B Vaccine 3 dose series if at intermediate or high risk (ex: diabetes, end stage renal disease, liver disease)   Tetanus (Td) Vaccine - COST NOT COVERED BY MEDICARE PART B Following completion of primary series, a booster dose should be given every 10 years to maintain immunity against tetanus  Td may also be given as tetanus wound prophylaxis  Tdap Vaccine - COST NOT COVERED BY MEDICARE PART B Recommended at least once for all adults  For pregnant patients, recommended with each pregnancy  Shingles Vaccine (Shingrix) - COST NOT COVERED BY MEDICARE PART B  2 shot series recommended in those aged 48 and above     Health Maintenance Due:      Topic Date Due   • Lung Cancer Screening  03/23/2024   • Hepatitis C Screening  Completed     Immunizations Due:      Topic Date Due   • COVID-19 Vaccine (5 - Booster for [place] [employer] series) 05/27/2022     Advance Directives   What are advance directives? Advance directives are legal documents that state your wishes and plans for medical care  These plans are made ahead of time in case you lose your ability to make decisions for yourself  Advance directives can apply to any medical decision, such as the treatments you want, and if you want to donate organs  What are the types of advance directives? There are many types of advance directives, and each state has rules about how to use them  You may choose a combination of any of the following:  · Living will: This is a written record of the treatment you want  You can also choose which treatments you do not want, which to limit, and which to stop at a certain time  This includes surgery, medicine, IV fluid, and tube feedings  · Durable power of  for healthcare [facility]): This is a written record that states who you want to make healthcare choices for you when you are unable to make them for yourself  This person, called a proxy, is usually a family member or a friend  You may choose more than 1 proxy  · Do not resuscitate (DNR) order:  A DNR order is used in case your heart stops beating or you stop breathing  It is a request not to have certain forms of treatment, such as CPR  A DNR order may be included in other types of advance directives  · Medical directive: This covers the care that you want if you are in a coma, near death, or unable to make decisions for yourself  You can list the treatments you want for each condition  Treatment may include pain medicine, surgery, blood transfusions, dialysis, IV or tube feedings, and a ventilator (breathing machine)  · Values history: This document has questions about your views, beliefs, and how you feel and think about life  This information can help others choose the care that you would choose  Why are advance directives important? An advance directive helps you control your care  Although spoken wishes may be used, it is better to have your wishes written down  Spoken wishes can be misunderstood, or not followed  Treatments may be given even if you do not want them  An advance directive may make it easier for your family to make difficult choices about your care  © Copyright [employer] 2018 Information is for End User's use only and may not be sold, redistributed or otherwise used for commercial purposes  All illustrations and images included in CareNotes® are the copyrighted property of [employer]  or [facility] Preventive Visit Patient Instructions  Thank you for completing your Welcome to Medicare Visit or Medicare Annual Wellness Visit today  Your next wellness visit will be due in one year (5/16/2024)    The screening/preventive services that you may require over the next 5-10 years are detailed below  Some tests may not apply to you based off risk factors and/or age  Screening tests ordered at today's visit but not completed yet may show as past due  Also, please note that scanned in results may not display below  Preventive Screenings:  Service Recommendations Previous Testing/Comments   Colorectal Cancer Screening  · Colonoscopy    · Fecal Occult Blood Test (FOBT)/Fecal Immunochemical Test (FIT)  · Fecal DNA/Cologuard Test  · Flexible Sigmoidoscopy Age: 39-70 years old   Colonoscopy: every 10 years (May be performed more frequently if at higher risk)  OR  FOBT/FIT: every 1 year  OR  Cologuard: every 3 years  OR  Sigmoidoscopy: every 5 years  Screening may be recommended earlier than age 39 if at higher risk for colorectal cancer  Also, an individualized decision between you and your healthcare provider will decide whether screening between the ages of 74-80 would be appropriate  Colonoscopy: 01/06/2021  FOBT/FIT: Not on file  Cologuard: Not on file  Sigmoidoscopy: Not on file    History Colorectal Cancer     Prostate Cancer Screening Individualized decision between patient and health care provider in men between ages of 53-78   Medicare will cover every 12 months beginning on the day after your 50th birthday PSA: 1 4 ng/mL     Screening Not Indicated     Hepatitis C Screening Once for adults born between 1945 and 1965  More frequently in patients at high risk for Hepatitis C Hep C Antibody: 07/27/2022    Screening Current   Diabetes Screening 1-2 times per year if you're at risk for diabetes or have pre-diabetes Fasting glucose: No results in last 5 years (No results in last 5 years)  A1C: 6 2 % (5/1/2023)  Screening Not Indicated  History Diabetes   Cholesterol Screening Once every 5 years if you don't have a lipid disorder  May order more often based on risk factors   Lipid panel: 05/01/2023  Screening Current      Other Preventive Screenings Covered by Medicare:  6  Abdominal Aortic Aneurysm (AAA) Screening: covered once if your at risk  You're considered to be at risk if you have a family history of AAA or a male between the age of 73-68 who smoking at least 100 cigarettes in your lifetime  7  Lung Cancer Screening: covers low dose CT scan once per year if you meet all of the following conditions: (1) Age 50-69; (2) No signs or symptoms of lung cancer; (3) Current smoker or have quit smoking within the last 15 years; (4) You have a tobacco smoking history of at least 20 pack years (packs per day x number of years you smoked); (5) You get a written order from a healthcare provider  8  Glaucoma Screening: covered annually if you're considered high risk: (1) You have diabetes OR (2) Family history of glaucoma OR (3)  aged 48 and older OR (3)  American aged 72 and older  5  Osteoporosis Screening: covered every 2 years if you meet one of the following conditions: (1) Have a vertebral abnormality; (2) On glucocorticoid therapy for more than 3 months; (3) Have primary hyperparathyroidism; (4) On osteoporosis medications and need to assess response to drug therapy  10  HIV Screening: covered annually if you're between the age of 12-76  Also covered annually if you are younger than 13 and older than 72 with risk factors for HIV infection  For pregnant patients, it is covered up to 3 times per pregnancy      Immunizations:  Immunization Recommendations   Influenza Vaccine Annual influenza vaccination during flu season is recommended for all persons aged >= 6 months who do not have contraindications   Pneumococcal Vaccine   * Pneumococcal conjugate vaccine = PCV13 (Prevnar 13), PCV15 (Vaxneuvance), PCV20 (Prevnar 20)  * Pneumococcal polysaccharide vaccine = PPSV23 (Pneumovax) Adults 25-60 years old: 1-3 doses may be recommended based on certain risk factors  Adults 72 years old: 1-2 doses may be recommended based off what pneumonia vaccine you previously received   Hepatitis B Vaccine 3 dose series if at intermediate or high risk (ex: diabetes, end stage renal disease, liver disease)   Tetanus (Td) Vaccine - COST NOT COVERED BY MEDICARE PART B Following completion of primary series, a booster dose should be given every 10 years to maintain immunity against tetanus  Td may also be given as tetanus wound prophylaxis  Tdap Vaccine - COST NOT COVERED BY MEDICARE PART B Recommended at least once for all adults  For pregnant patients, recommended with each pregnancy  Shingles Vaccine (Shingrix) - COST NOT COVERED BY MEDICARE PART B  2 shot series recommended in those aged 48 and above     Health Maintenance Due:      Topic Date Due   • Lung Cancer Screening  03/23/2024   • Hepatitis C Screening  Completed     Immunizations Due:      Topic Date Due   • COVID-19 Vaccine (5 - Booster for [place] series) 05/27/2022     Advance Directives   What are advance directives? Advance directives are legal documents that state your wishes and plans for medical care  These plans are made ahead of time in case you lose your ability to make decisions for yourself  Advance directives can apply to any medical decision, such as the treatments you want, and if you want to donate organs  What are the types of advance directives? There are many types of advance directives, and each state has rules about how to use them  You may choose a combination of any of the following:  · Living will: This is a written record of the treatment you want  You can also choose which treatments you do not want, which to limit, and which to stop at a certain time  This includes surgery, medicine, IV fluid, and tube feedings  · Durable power of  for healthcare [facility]): This is a written record that states who you want to make healthcare choices for you when you are unable to make them for yourself  This person, called a proxy, is usually a family member or a friend   You may choose more than 1 proxy  · Do not resuscitate (DNR) order:  A DNR order is used in case your heart stops beating or you stop breathing  It is a request not to have certain forms of treatment, such as CPR  A DNR order may be included in other types of advance directives  · Medical directive: This covers the care that you want if you are in a coma, near death, or unable to make decisions for yourself  You can list the treatments you want for each condition  Treatment may include pain medicine, surgery, blood transfusions, dialysis, IV or tube feedings, and a ventilator (breathing machine)  · Values history: This document has questions about your views, beliefs, and how you feel and think about life  This information can help others choose the care that you would choose  Why are advance directives important? An advance directive helps you control your care  Although spoken wishes may be used, it is better to have your wishes written down  Spoken wishes can be misunderstood, or not followed  Treatments may be given even if you do not want them  An advance directive may make it easier for your family to make difficult choices about your care  © Copyright [employer] 2018 Information is for End User's use only and may not be sold, redistributed or otherwise used for commercial purposes   All illustrations and images included in CareNotes® are the copyrighted property of A [employer] Inc  or [facility] [email or web address]

## 2023-05-16 NOTE — ASSESSMENT & PLAN NOTE
Lab Results   Component Value Date    HGBA1C 6 2 (H) 05/01/2023   DM type 2 without complications - controlled with A1C 6 2 - con't meds and labs and f/u as per ROSALINA Vang on DM foot exam (3/23) and eye exam (3/22 - 2yrs), he is on a statin and ASA but no ACE/ARB, will follow

## 2023-05-18 ENCOUNTER — OFFICE VISIT (OUTPATIENT)
Dept: ENDOCRINOLOGY | Facility: HOSPITAL | Age: 78
End: 2023-05-18

## 2023-05-18 VITALS
SYSTOLIC BLOOD PRESSURE: 120 MMHG | BODY MASS INDEX: 22.76 KG/M2 | DIASTOLIC BLOOD PRESSURE: 60 MMHG | HEIGHT: 70 IN | HEART RATE: 70 BPM | WEIGHT: 159 LBS

## 2023-05-18 DIAGNOSIS — I10 ESSENTIAL HYPERTENSION: Chronic | ICD-10-CM

## 2023-05-18 DIAGNOSIS — Z95.1 HX OF CABG: ICD-10-CM

## 2023-05-18 DIAGNOSIS — E78.5 DYSLIPIDEMIA: Chronic | ICD-10-CM

## 2023-05-18 DIAGNOSIS — E11.9 TYPE 2 DIABETES MELLITUS WITHOUT COMPLICATION, WITHOUT LONG-TERM CURRENT USE OF INSULIN (HCC): Primary | Chronic | ICD-10-CM

## 2023-05-18 NOTE — PROGRESS NOTES
Established Patient Progress Note       Chief Complaint   Patient presents with   • Diabetes Type 2      History of Present Illness:     Gamaliel Medina is a 69 y o  male with a history of T2DM since 5-6 years on metformin/jardiance only w/o any microvascular complications with CAD s/p CABG with recent dx of MM on chemotherapy on Dex every month  Last visit 03/23  Last visit:- BG reviewed indicated some PPH mainly during days of chemotherapy  Started on jardiance 25mg daily to help with PPH  Patient presents for 3 months follow up since     Current chemotherapy plan:- Dexamethasone 20mg on day of chemotherapy, next session today, Chemo once a month- going to every 2 months now after  Maintenance therapy  Blood Sugar/Glucometer/Pump/CGM review:    Checks BG once a day, fasting 67, 147, 154 140  Day the after chemotherapy 252  309  Some PP BG readings 241 at 4:30 pm    Current regime:-   Metformin 1500mg daily, jardiance 25mg   - No longer having diarrhea now, previously on 1000mg BID with diarrhea    Medications tried/failed in past:- None  Hypoglycemic episodes: None  Hyperglycemia:-  Polyuria d/t lasix, denies any blurry vision, headaches  Does report some tingling in feet but attributes this to a reaction he has during his chemotherapy  Diet- is not following diabetic diet but is eating better   Activity- tries to stay active but not overtly  Weight- stable    last eye exam- 6 months ago, see's retina specialist  Last exam on file 03/22   Had to reschedule twice d/t availability issue, Will schedule follow up June 2023  last foot exam - Done 03/13/23  History of hypertension- Yes on toprol 100mg   History of hyperlipidemia- Yes on lipitor 40mg   Last lipid:- 05/23, LDL <100   Last urine microalbumina:- 05/23- normal   Last HbA1C 02/23:- 8 9%, 05/23 6 2%    Social hx:- Does not smoke, drink alcohol or use any other drugs   Family hX:- father and brother have t2DM    Patient Active Problem List Diagnosis   • Basal cell carcinoma of skin   • Coronary artery disease involving native heart with angina pectoris, unspecified vessel or lesion type ([place] [address] )   • Type 2 diabetes mellitus without complication, without long-term current use of insulin (HCC)   • Dyslipidemia   • Erectile dysfunction   • Fatty liver   • Lymphoma ([facility] )   • Malignant tumor of cecum (HCC)   • Multiple myeloma not having achieved remission ([place] [address] )   • Primary localized osteoarthritis of right knee   • Hx of CABG   • Essential hypertension   • S/P total knee arthroplasty, left   • Chronic diastolic congestive heart failure (HCC)   • History of aortic valve replacement with bioprosthetic valve   • Leukocytosis   • Paroxysmal atrial fibrillation (HCC)   • Pharyngeal dysphagia   • Personal history of colon cancer   • Roberts's esophagus without dysplasia   • Schatzki's ring of distal esophagus   • Gastroesophageal reflux disease with esophagitis   • Iron deficiency anemia   • Mouth ulcers   • Chest pain   • Glossitis   • CVID (common variable immunodeficiency) (HCC)   • Severe sepsis ([place] [address] )   • Foraminal stenosis of cervical region   • Cervical radiculopathy   • Pneumonia of right lower lobe due to infectious organism      Past Medical History:   Diagnosis Date   • Acute respiratory failure with hypoxia ([place] [address] ) 04/20/2022   • Arthritis    • Cancer [facility])    • Cataract    • Colitis    • Colon polyp    • Fatty liver    • History of chemotherapy    • History of radiation therapy    • History of shingles 2018   • History of transfusion    • Hyperlipidemia    • Hypertension       Past Surgical History:   Procedure Laterality Date   • AORTIC VALVE REPLACEMENT     • CARDIAC VALVE REPLACEMENT  2014    aorta   • CATARACT EXTRACTION Bilateral    • COLECTOMY     • COLONOSCOPY  11/2019    Prior right hemicolectomy   • CORONARY ARTERY BYPASS GRAFT     • DENTAL SURGERY     • JOINT REPLACEMENT  January 2019    left knee   • KNEE SURGERY     • LYMPH NODE BIOPSY "2003   • LYMPHADENECTOMY     • OTHER SURGICAL HISTORY      MAZE PROCEDURE   • [place] ARTHRP KNE CONDYLE&PLATU MEDIAL&LAT COMPARTMENTS Left 2019    Procedure: ARTHROPLASTY LEFT KNEE TOTAL;  Surgeon: Krysten Barnes MD;  Location:  MAIN OR;  Service: Orthopedics   • [place] LARYNGOSCOPY DIRECT OPERATIVE W/BIOPSY Right 2022    Procedure: DIRECT LARYNGOSCOPY WITH BIOPSY RIGHT PHARYNX, POSSIBLE RIGHT NECK LYMPH NODE BIOPSY; FROZEN SECTION;  Surgeon: Noreen Cr MD;  Location:  MAIN OR;  Service: ENT   • SKIN BIOPSY     • UPPER GASTROINTESTINAL ENDOSCOPY  2019    Schatzki ring   • US GUIDED LYMPH NODE BIOPSY RIGHT  2021      Family History   Problem Relation Age of Onset   • Heart block Family    • Coronary artery disease Mother    • Thrombosis Mother    • Hypertension Mother    • Arthritis Mother    • Hyperlipidemia Mother    • Diabetes Father    • Hypertension Father    • Arthritis Father    • Sudden death Father         cardiac   • Diabetes type II Father    • Colon cancer Paternal Grandfather    • Cancer Paternal Grandfather    • Breast cancer Sister    • Heart disease Brother         Coronary stents     Social History     Tobacco Use   • Smoking status: Former     Packs/day: 1 00     Years: 42 00     Pack years: 42 00     Types: Cigarettes     Start date: 5     Quit date: 2009     Years since quittin 3   • Smokeless tobacco: Never   Substance Use Topics   • Alcohol use: Yes     Alcohol/week: 2 0 standard drinks     Types: 2 Cans of beer per week     Comment: very rare \"beer here and there\"     Allergies   Allergen Reactions   • Ceftin [Cefuroxime] Itching     However, has tolerated Cefazolin and Pip-Tazo since, which have different side chains  Be cautious with / avoid 2nd, 3rd, or 4th Gen Cephs that have similar side chains to Cefuroxime     • Lantus [Insulin Glargine] Itching       Current Outpatient Medications:   •  aspirin 81 MG tablet, Take 1 tablet (81 mg total) by mouth daily, " Disp: , Rfl:   •  atorvastatin (LIPITOR) 40 mg tablet, TAKE 1 TABLET BY MOUTH EVERY DAY, Disp: 90 tablet, Rfl: 3  •  cholecalciferol (VITAMIN D3) 1,000 units tablet, Take 2 tablets (2,000 Units total) by mouth daily, Disp: 60 tablet, Rfl: 0  •  dexamethasone (DECADRON) 4 mg tablet, TAKE 5 TABLETS BY MOUTH ONCE A WEEK, Disp: , Rfl:   •  Empagliflozin 25 MG TABS, Take 1 tablet (25 mg total) by mouth every morning, Disp: 90 tablet, Rfl: 1  •  ferrous sulfate 324 (65 Fe) mg, TAKE 1 TABLET BY MOUTH DAILY WITH BREAKFAST, Disp: 90 tablet, Rfl: 0  •  furosemide (LASIX) 20 mg tablet, TAKE 1 TABLET BY MOUTH TWICE A DAY, Disp: 180 tablet, Rfl: 0  •  IMMUNE GLOBULIN, HUMAN, IV, Inject 30,000 mg into a catheter in a vein, Disp: , Rfl:   •  metFORMIN (GLUCOPHAGE-XR) 500 mg 24 hr tablet, Take 3 tablets (1,500 mg total) by mouth daily with breakfast Take 3 pills in morning, Disp: 120 tablet, Rfl: 0  •  metoprolol succinate (TOPROL-XL) 100 mg 24 hr tablet, TAKE 1 TABLET BY MOUTH EVERY DAY, Disp: 90 tablet, Rfl: 1  •  montelukast (SINGULAIR) 10 mg tablet, Take 10 mg by mouth daily, Disp: , Rfl:   •  multivitamin-minerals (CENTRUM ADULTS) tablet, Take 1 tablet by mouth daily, Disp:  , Rfl: 0  •  nitroglycerin (NITROSTAT) 0 3 mg SL tablet, Nitrostat 0 3 mg sublingual tablet  Place 1 tablet as needed by sublingual route as needed for 90 days  , Disp: , Rfl:   •  omeprazole (PriLOSEC) 40 MG capsule, TAKE 1 CAPSULE (40 MG TOTAL) BY MOUTH DAILY  , Disp: 90 capsule, Rfl: 2  •  valACYclovir (VALTREX) 500 mg tablet, Take 500 mg by mouth daily, Disp: , Rfl:     Review of Systems   Constitutional: Negative for diaphoresis, fatigue and unexpected weight change  Respiratory: Negative for shortness of breath  Cardiovascular: Negative for chest pain and palpitations  Gastrointestinal: Negative for constipation and diarrhea  Endocrine: Positive for polyuria (On lasix)  Negative for polydipsia         Physical Exam:  Body mass index is 22 81 "kg/m²  /60   Pulse 70   Ht 5' 10\" (1 778 m)   Wt 72 1 kg (159 lb)   BMI 22 81 kg/m²    Wt Readings from Last 3 Encounters:   05/18/23 72 1 kg (159 lb)   05/16/23 72 3 kg (159 lb 6 4 oz)   05/02/23 71 8 kg (158 lb 6 4 oz)       Physical Exam  Constitutional:       Appearance: Normal appearance  Cardiovascular:      Rate and Rhythm: Normal rate and regular rhythm  Pulses: Normal pulses  no weak pulses          Dorsalis pedis pulses are 2+ on the right side and 2+ on the left side  Pulmonary:      Effort: Pulmonary effort is normal    Abdominal:      General: Abdomen is flat  Palpations: Abdomen is soft  Feet:      Right foot:      Skin integrity: No ulcer, skin breakdown, erythema, warmth, callus or dry skin  Left foot:      Skin integrity: No ulcer, skin breakdown, erythema, warmth, callus or dry skin  Skin:     General: Skin is warm  Capillary Refill: Capillary refill takes less than 2 seconds  Neurological:      General: No focal deficit present  Mental Status: He is alert         Labs:      Latest Reference Range & Units 05/13/21 05:03 07/13/21 08:20 01/17/22 06:46 04/19/22 05:50 07/27/22 09:45 02/01/23 08:49 05/01/23 09:30   Hemoglobin A1C 4 8 - 5 6 % 7 4 (H) 5 3 6 1 (H) 5 8 (H) 6 6 (H) 8 9 (H) 6 2 (H)   (H): Data is abnormally high     Latest Reference Range & Units 01/17/22 06:46 02/01/23 08:49   EXT Creatinine Urine Not Estab  mg/dL 117 5 59 1   MICROALBUMIN/CREATININE RATIO 0 - 29 mg/g creat 4 23   MICROALBUM ,U,RANDOM Not Estab  ug/mL 4 5 13 7        Latest Reference Range & Units 01/17/22 06:46 02/01/23 08:49   Cholesterol 100 - 199 mg/dL 103 113   Triglycerides 0 - 149 mg/dL 117 110   HDL >39 mg/dL 27 (L) 35 (L)   LDL Calculated 0 - 99 mg/dL 54 58   VLDL Cholesterol Shaw 5 - 40 mg/dL 22 20   (L): Data is abnormally low   Latest Reference Range & Units 02/05/23 19:37 02/06/23 04:46 02/07/23 04:47   eGFR ml/min/1 73sq m 83 86 83      04/14/18 08:44 07/26/19 11:26 " 03/18/22 11:51   Left Eye Diabetic Retinopathy None (E) None (E) None (E)   (E): External lab result   04/14/18 08:44 07/26/19 11:26 03/18/22 11:51   Right Eye Diabetic Retinopathy None (E) None (E) None (E)   (E): External lab result  Impression & Plan:    Problem List Items Addressed This Visit        Endocrine    Type 2 diabetes mellitus without complication, without long-term current use of insulin (HCC) - Primary (Chronic)    Relevant Orders    Hemoglobin A1C    Comprehensive metabolic panel    Albumin / creatinine urine ratio    Lipid panel       Cardiovascular and Mediastinum    Essential hypertension (Chronic)       Other    Dyslipidemia (Chronic)    Hx of CABG       Orders Placed This Encounter   Procedures   • Hemoglobin A1C     Standing Status:   Future     Number of Occurrences:   1     Standing Expiration Date:   5/18/2024   • Comprehensive metabolic panel     This is a patient instruction: Patient fasting for 8 hours or longer recommended  Standing Status:   Future     Number of Occurrences:   1     Standing Expiration Date:   5/18/2024   • Albumin / creatinine urine ratio     Standing Status:   Future     Number of Occurrences:   1     Standing Expiration Date:   5/18/2024   • Lipid panel     This is a patient instruction: This test requires patient fasting for 10-12 hours or longer  Drinking of black coffee or black tea is acceptable  Standing Status:   Future     Number of Occurrences:   1     Standing Expiration Date:   5/18/2024       There are no Patient Instructions on file for this visit  Patient is a 75yM with PMHx of well controlled T2DM since 6 years on metformin/jardiance only with complication on CAD s/p CABG in 2014 with dx of multiple myeloma on chemotherapy with some steriod induced hyperglycemia with other PMHx of hypertension, hyperlipidemia who presents today for Diabetes management  Last visit 03/23       1) T2DM:- Patients HbA1C has improved from 8 9% to 6 2% now, BG logs does some BG in range but still in 120-150 ranges with some PPH- limited data and also high BG the day after chemotherapy d/t being on decadron 20mg daily for 1 day  Given now on chemotherapy less often  Hopefully will have less BG raises d/t steroids now  Given improved HbA1C and overall BG in range, recommend no change to regime  Did discuss working on diet changes to help with [facility]     Plan   - c/w Metformin 1500mg daily   - c/w jardiance 25mg daily   - Check BG once a day- alternate fasting and bedtime  - offered CGM but patient not interested   - Repeat HbA1C in 3 months     Screening  - Retinopathy- uptodate, discussed to please get record next visit   - Lipid- uptodate 05/23, LDL <100, c/w Lipitor 20mg daily, repeat next visit  - Urine microalbumin- Uptodate 05/23, normal, repeat next visit    2) Hyperlipidemia:-  uptodate 02/23, LDL <100, c/w Lipitor 20mg daily, repeat next visit  3) Hypertension:- BP in clinic controlled at 120/60, c/w toprol     Discussed with the patient and all questioned fully answered  He will call me if any problems arise  Follow-up appointment in 3 months       Counseled patient on diagnostic results, prognosis, risk and benefit of treatment options, instruction for management, importance of treatment compliance, Risk  factor reduction and impressions      Liyah Fitzpatrick MD

## 2023-05-24 DIAGNOSIS — E11.9 TYPE 2 DIABETES MELLITUS WITHOUT COMPLICATION, WITHOUT LONG-TERM CURRENT USE OF INSULIN (HCC): Chronic | ICD-10-CM

## 2023-05-24 RX ORDER — METFORMIN HYDROCHLORIDE 500 MG/1
1500 TABLET, EXTENDED RELEASE ORAL
Qty: 120 TABLET | Refills: 0 | Status: SHIPPED | OUTPATIENT
Start: 2023-05-24

## 2023-05-28 PROBLEM — J18.9 PNEUMONIA OF RIGHT LOWER LOBE DUE TO INFECTIOUS ORGANISM: Status: RESOLVED | Noted: 2023-03-23 | Resolved: 2023-05-28

## 2023-05-28 PROBLEM — A41.9 SEVERE SEPSIS (HCC): Status: RESOLVED | Noted: 2023-02-04 | Resolved: 2023-05-28

## 2023-05-28 PROBLEM — R65.20 SEVERE SEPSIS (HCC): Status: RESOLVED | Noted: 2023-02-04 | Resolved: 2023-05-28

## 2023-05-30 ENCOUNTER — OFFICE VISIT (OUTPATIENT)
Dept: PAIN MEDICINE | Facility: CLINIC | Age: 78
End: 2023-05-30

## 2023-05-30 VITALS
TEMPERATURE: 97.8 F | WEIGHT: 156 LBS | BODY MASS INDEX: 22.33 KG/M2 | SYSTOLIC BLOOD PRESSURE: 116 MMHG | DIASTOLIC BLOOD PRESSURE: 62 MMHG | HEIGHT: 70 IN | HEART RATE: 78 BPM

## 2023-05-30 DIAGNOSIS — M54.12 CERVICAL RADICULOPATHY: Primary | ICD-10-CM

## 2023-05-30 DIAGNOSIS — M48.02 FORAMINAL STENOSIS OF CERVICAL REGION: ICD-10-CM

## 2023-05-30 DIAGNOSIS — M47.812 CERVICAL SPONDYLOSIS: ICD-10-CM

## 2023-05-30 DIAGNOSIS — G89.29 CHRONIC RIGHT SHOULDER PAIN: ICD-10-CM

## 2023-05-30 DIAGNOSIS — M25.511 CHRONIC RIGHT SHOULDER PAIN: ICD-10-CM

## 2023-05-30 NOTE — PROGRESS NOTES
Assessment:  1  Cervical radiculopathy    2  Foraminal stenosis of cervical region    3  Cervical spondylosis    4  Chronic right shoulder pain        Plan:  While the patient was in the office today, I did have a thorough conversation regarding their chronic pain syndrome, medication management, and treatment plan options  The patient underwent a C7-T1 interlaminar epidural steroid injection on 3/20/2023 and is able to report 80% reduction in pain  He is pleased with the results of the procedure and feels that his residual pain is something he can manage  He is complaining of unrelated pain in the right shoulder but states that he has had this issue on and off before  I told him to contact our office if that pain should increase at which point I would recommend an x-ray of the right shoulder and possibly an injection  The patient was advised to contact the office should their symptoms worsen in the interim  The patient was agreeable and verbalized an understanding  History of Present Illness: The patient is a 69 y o  male last seen on 3/20/2023 who presents for a follow up office visit in regards to neck pain  The patient currently reports chronic radicular neck pain that he presently rates a 1 out of 10 on the pain scale and since the injection reports it as an occasional dull ache with pins-and-needles in the arm  On 3/20/2023 he underwent a C7-T1 epidural steroid injection and is able to report 80% reduction in pain which is still ongoing  He is reporting a separate issue of localized pain in the right shoulder but states that it is not something that he would like addressed yet  I have personally reviewed and/or updated the patient's past medical history, past surgical history, family history, social history, current medications, allergies, and vital signs today  Review of Systems:    Review of Systems   Respiratory: Negative for shortness of breath      Cardiovascular: Negative for chest pain  Gastrointestinal: Negative for constipation, diarrhea, nausea and vomiting  Musculoskeletal: Negative for arthralgias, gait problem, joint swelling and myalgias  Skin: Negative for rash  Neurological: Negative for dizziness, seizures and weakness  All other systems reviewed and are negative          Past Medical History:   Diagnosis Date   • Acute respiratory failure with hypoxia ([facility] ) 04/20/2022   • Arthritis    • Cancer [facility])    • Cataract    • Colitis    • Colon polyp    • Fatty liver    • History of chemotherapy    • History of radiation therapy    • History of shingles 2018   • History of transfusion    • Hyperlipidemia    • Hypertension        Past Surgical History:   Procedure Laterality Date   • AORTIC VALVE REPLACEMENT     • CARDIAC VALVE REPLACEMENT  2014    aorta   • CATARACT EXTRACTION Bilateral    • COLECTOMY     • COLONOSCOPY  11/2019    Prior right hemicolectomy   • CORONARY ARTERY BYPASS GRAFT     • DENTAL SURGERY     • JOINT REPLACEMENT  January 2019    left knee   • KNEE SURGERY     • LYMPH NODE BIOPSY  2003   • LYMPHADENECTOMY     • OTHER SURGICAL HISTORY      MAZE PROCEDURE   • [place] ARTHRP KNE CONDYLE&PLATU MEDIAL&LAT COMPARTMENTS Left 01/28/2019    Procedure: ARTHROPLASTY LEFT KNEE TOTAL;  Surgeon: Damian Montalvo MD;  Location: [facility] OR;  Service: Orthopedics   • [place] LARYNGOSCOPY DIRECT OPERATIVE W/BIOPSY Right 04/27/2022    Procedure: DIRECT LARYNGOSCOPY WITH BIOPSY RIGHT PHARYNX, POSSIBLE RIGHT NECK LYMPH NODE BIOPSY; FROZEN SECTION;  Surgeon: Diego Person MD;  Location:  MAIN OR;  Service: ENT   • SKIN BIOPSY     • UPPER GASTROINTESTINAL ENDOSCOPY  11/2019    Clementetzki ring   • US GUIDED LYMPH NODE BIOPSY RIGHT  12/14/2021       Family History   Problem Relation Age of Onset   • Heart block Family    • Coronary artery disease Mother    • Thrombosis Mother    • Hypertension Mother    • Arthritis Mother    • Hyperlipidemia Mother    • Diabetes Father    • Hypertension "Father    • Arthritis Father    • Sudden death Father         cardiac   • Diabetes type II Father    • Colon cancer Paternal Grandfather    • Cancer Paternal Grandfather    • Breast cancer Sister    • Heart disease Brother         Coronary stents       Social History     Occupational History   • Occupation: WORKING FULLTIME    Tobacco Use   • Smoking status: Former     Packs/day: 1 00     Years: 42 00     Total pack years: 42 00     Types: Cigarettes     Start date: 5     Quit date: 2009     Years since quittin 4   • Smokeless tobacco: Never   Vaping Use   • Vaping Use: Never used   Substance and Sexual Activity   • Alcohol use:  Yes     Alcohol/week: 2 0 standard drinks of alcohol     Types: 2 Cans of beer per week     Comment: very rare \"beer here and there\"   • Drug use: No   • Sexual activity: Not Currently     Partners: Female     Birth control/protection: None         Current Outpatient Medications:   •  aspirin 81 MG tablet, Take 1 tablet (81 mg total) by mouth daily, Disp: , Rfl:   •  atorvastatin (LIPITOR) 40 mg tablet, TAKE 1 TABLET BY MOUTH EVERY DAY, Disp: 90 tablet, Rfl: 3  •  cholecalciferol (VITAMIN D3) 1,000 units tablet, Take 2 tablets (2,000 Units total) by mouth daily, Disp: 60 tablet, Rfl: 0  •  Empagliflozin 25 MG TABS, Take 1 tablet (25 mg total) by mouth every morning, Disp: 90 tablet, Rfl: 1  •  ferrous sulfate 324 (65 Fe) mg, TAKE 1 TABLET BY MOUTH DAILY WITH BREAKFAST, Disp: 90 tablet, Rfl: 0  •  furosemide (LASIX) 20 mg tablet, TAKE 1 TABLET BY MOUTH TWICE A DAY, Disp: 180 tablet, Rfl: 0  •  metFORMIN (GLUCOPHAGE-XR) 500 mg 24 hr tablet, Take 3 tablets (1,500 mg total) by mouth daily with breakfast Take 3 pills in morning, Disp: 120 tablet, Rfl: 0  •  metoprolol succinate (TOPROL-XL) 100 mg 24 hr tablet, TAKE 1 TABLET BY MOUTH EVERY DAY, Disp: 90 tablet, Rfl: 1  •  montelukast (SINGULAIR) 10 mg tablet, Take 10 mg by mouth daily, Disp: , Rfl:   •  multivitamin-minerals (CENTRUM " "ADULTS) tablet, Take 1 tablet by mouth daily, Disp:  , Rfl: 0  •  omeprazole (PriLOSEC) 40 MG capsule, TAKE 1 CAPSULE (40 MG TOTAL) BY MOUTH DAILY  , Disp: 90 capsule, Rfl: 2  •  valACYclovir (VALTREX) 500 mg tablet, Take 500 mg by mouth daily, Disp: , Rfl:   •  dexamethasone (DECADRON) 4 mg tablet, TAKE 5 TABLETS BY MOUTH ONCE A WEEK, Disp: , Rfl:   •  IMMUNE GLOBULIN, HUMAN, IV, Inject 30,000 mg into a catheter in a vein, Disp: , Rfl:   •  nitroglycerin (NITROSTAT) 0 3 mg SL tablet, Nitrostat 0 3 mg sublingual tablet  Place 1 tablet as needed by sublingual route as needed for 90 days  , Disp: , Rfl:     Allergies   Allergen Reactions   • Ceftin [Cefuroxime] Itching     However, has tolerated Cefazolin and Pip-Tazo since, which have different side chains  Be cautious with / avoid 2nd, 3rd, or 4th Gen Cephs that have similar side chains to Cefuroxime  • Lantus [Insulin Glargine] Itching       Physical Exam:    /62 (BP Location: Left arm, Patient Position: Sitting, Cuff Size: Standard)   Pulse 78   Temp 97 8 °F (36 6 °C)   Ht 5' 10\" (1 778 m)   Wt 70 8 kg (156 lb)   BMI 22 38 kg/m²     Constitutional:normal, well developed, well nourished, alert, in no distress and non-toxic and no overt pain behavior  Eyes:anicteric  HEENT:grossly intact  Neck:supple, symmetric, trachea midline and no masses   Pulmonary:even and unlabored  Cardiovascular:No edema or pitting edema present  Skin:Normal without rashes or lesions and well hydrated  Psychiatric:Mood and affect appropriate  Neurologic:Cranial Nerves II-XII grossly intact  Musculoskeletal: Tender over the right AC joint, pain with range of motion in all planes  Imaging  No orders to display         No orders of the defined types were placed in this encounter      "

## 2023-06-30 DIAGNOSIS — D50.9 IRON DEFICIENCY ANEMIA, UNSPECIFIED IRON DEFICIENCY ANEMIA TYPE: ICD-10-CM

## 2023-06-30 DIAGNOSIS — R07.9 CHEST PAIN: ICD-10-CM

## 2023-06-30 RX ORDER — FUROSEMIDE 20 MG/1
TABLET ORAL
Qty: 180 TABLET | Refills: 0 | Status: SHIPPED | OUTPATIENT
Start: 2023-06-30

## 2023-06-30 RX ORDER — FERROUS SULFATE TAB EC 324 MG (65 MG FE EQUIVALENT) 324 (65 FE) MG
TABLET DELAYED RESPONSE ORAL
Qty: 90 TABLET | Refills: 0 | Status: SHIPPED | OUTPATIENT
Start: 2023-06-30

## 2023-07-21 DIAGNOSIS — I25.10 CORONARY ARTERY DISEASE INVOLVING NATIVE CORONARY ARTERY OF NATIVE HEART WITHOUT ANGINA PECTORIS: ICD-10-CM

## 2023-07-21 RX ORDER — ATORVASTATIN CALCIUM 40 MG/1
TABLET, FILM COATED ORAL
Qty: 90 TABLET | Refills: 3 | Status: SHIPPED | OUTPATIENT
Start: 2023-07-21

## 2023-08-16 LAB
LEFT EYE DIABETIC RETINOPATHY: NORMAL
RIGHT EYE DIABETIC RETINOPATHY: NORMAL

## 2023-08-17 LAB
ALBUMIN SERPL-MCNC: 4.4 G/DL (ref 3.8–4.8)
ALBUMIN/CREAT UR: 23 MG/G CREAT (ref 0–29)
ALBUMIN/GLOB SERPL: 2 {RATIO} (ref 1.2–2.2)
ALP SERPL-CCNC: 110 IU/L (ref 44–121)
ALT SERPL-CCNC: 14 IU/L (ref 0–44)
AST SERPL-CCNC: 19 IU/L (ref 0–40)
BILIRUB SERPL-MCNC: 0.6 MG/DL (ref 0–1.2)
BUN SERPL-MCNC: 14 MG/DL (ref 8–27)
BUN/CREAT SERPL: 14 (ref 10–24)
CALCIUM SERPL-MCNC: 9.7 MG/DL (ref 8.6–10.2)
CHLORIDE SERPL-SCNC: 101 MMOL/L (ref 96–106)
CHOLEST SERPL-MCNC: 106 MG/DL (ref 100–199)
CHOLEST/HDLC SERPL: 3.1 RATIO (ref 0–5)
CO2 SERPL-SCNC: 30 MMOL/L (ref 20–29)
CREAT SERPL-MCNC: 1.02 MG/DL (ref 0.76–1.27)
CREAT UR-MCNC: 66.5 MG/DL
EGFR: 76 ML/MIN/1.73
EST. AVERAGE GLUCOSE BLD GHB EST-MCNC: 123 MG/DL
GLOBULIN SER-MCNC: 2.2 G/DL (ref 1.5–4.5)
GLUCOSE SERPL-MCNC: 107 MG/DL (ref 70–99)
HBA1C MFR BLD: 5.9 % (ref 4.8–5.6)
HDLC SERPL-MCNC: 34 MG/DL
LDLC SERPL CALC-MCNC: 46 MG/DL (ref 0–99)
MICROALBUMIN UR-MCNC: 15.3 UG/ML
POTASSIUM SERPL-SCNC: 3.7 MMOL/L (ref 3.5–5.2)
PROT SERPL-MCNC: 6.6 G/DL (ref 6–8.5)
SL AMB VLDL CHOLESTEROL CALC: 26 MG/DL (ref 5–40)
SODIUM SERPL-SCNC: 146 MMOL/L (ref 134–144)
TRIGL SERPL-MCNC: 149 MG/DL (ref 0–149)

## 2023-08-23 ENCOUNTER — OFFICE VISIT (OUTPATIENT)
Dept: ENDOCRINOLOGY | Facility: HOSPITAL | Age: 78
End: 2023-08-23
Payer: COMMERCIAL

## 2023-08-23 VITALS
BODY MASS INDEX: 22.56 KG/M2 | DIASTOLIC BLOOD PRESSURE: 60 MMHG | SYSTOLIC BLOOD PRESSURE: 128 MMHG | OXYGEN SATURATION: 96 % | WEIGHT: 157.6 LBS | HEIGHT: 70 IN | HEART RATE: 77 BPM

## 2023-08-23 DIAGNOSIS — I10 ESSENTIAL HYPERTENSION: ICD-10-CM

## 2023-08-23 DIAGNOSIS — E78.5 DYSLIPIDEMIA: ICD-10-CM

## 2023-08-23 DIAGNOSIS — E11.9 TYPE 2 DIABETES MELLITUS WITHOUT COMPLICATION, WITHOUT LONG-TERM CURRENT USE OF INSULIN (HCC): Primary | ICD-10-CM

## 2023-08-23 DIAGNOSIS — Z95.1 HX OF CABG: ICD-10-CM

## 2023-08-23 PROCEDURE — 99214 OFFICE O/P EST MOD 30 MIN: CPT | Performed by: STUDENT IN AN ORGANIZED HEALTH CARE EDUCATION/TRAINING PROGRAM

## 2023-08-23 NOTE — PROGRESS NOTES
Established Patient Progress Note       Chief Complaint   Patient presents with   • Diabetes Type 2      History of Present Illness:     Matt Sagastume is a 69 y.o. male with a history of T2DM since 5-6 years on metformin/jardiance only w/o any microvascular complications with CAD s/p CABG with recent dx of MM on chemotherapy on Dex every month. Last visit 05/23. Current chemotherapy plan:- Dexamethasone 20mg on day of chemotherapy, and also reports gets steriods through IV with chemo. Doesn't know dose or type. Chemo once a month. . Maintenance therapy. Blood Sugar/Glucometer/Pump/CGM review:    Checks once a day BG in 100-120 range  Current regime:-   Metformin 1500mg daily, jardiance 25mg     Medications tried/failed in past:- None  Hypoglycemic episodes: None  Hyperglycemia:-  Polyuria d/t lasix, denies any blurry vision, headaches. Does report some tingling in feet but attributes this to a reaction he has during his chemotherapy.    Diet- is not following diabetic diet but is eating better   Activity- walks a lot more, has a new dog  Weight- stable    last eye exam- Recently had exam last week, no retinopathy  last foot exam - Done 03/13/23  History of hypertension- Yes on toprol 100mg   History of hyperlipidemia- Yes on lipitor 40mg   Last lipid:- 08/23, LDL <100   Last urine microalbumina:- 08/23- normal   Last HbA1C  08/23 5.9%, 02/23:- 8.9%, 05/23 6.2%    Social hx:- Does not smoke, drink alcohol or use any other drugs   Family hX:- father and brother have t2DM    Patient Active Problem List   Diagnosis   • Basal cell carcinoma of skin   • Coronary artery disease involving native heart with angina pectoris, unspecified vessel or lesion type ([address])   • Type 2 diabetes mellitus without complication, without long-term current use of insulin (HCC)   • Dyslipidemia   • Erectile dysfunction   • Fatty liver   • Lymphoma ([address])   • Malignant tumor of cecum (HCC)   • Multiple myeloma not having achieved remission ([address])   • Primary localized osteoarthritis of right knee   • Hx of CABG   • Essential hypertension   • S/P total knee arthroplasty, left   • Chronic diastolic congestive heart failure ([address])   • History of aortic valve replacement with bioprosthetic valve   • Leukocytosis   • Paroxysmal atrial fibrillation (HCC)   • Pharyngeal dysphagia   • Personal history of colon cancer   • Roberts's esophagus without dysplasia   • Schatzki's ring of distal esophagus   • Gastroesophageal reflux disease with esophagitis   • Iron deficiency anemia   • Mouth ulcers   • Chest pain   • Glossitis   • CVID (common variable immunodeficiency) ([address])   • Foraminal stenosis of cervical region   • Cervical radiculopathy      Past Medical History:   Diagnosis Date   • Acute respiratory failure with hypoxia ([address]) 04/20/2022   • Arthritis    • Cancer [facility])    • Cataract    • Colitis    • Colon polyp    • Fatty liver    • History of chemotherapy    • History of radiation therapy    • History of shingles 2018   • History of transfusion    • Hyperlipidemia    • Hypertension       Past Surgical History:   Procedure Laterality Date   • AORTIC VALVE REPLACEMENT     • CARDIAC VALVE REPLACEMENT  2014    aorta   • CATARACT EXTRACTION Bilateral    • COLECTOMY     • COLONOSCOPY  11/2019    Prior right hemicolectomy   • CORONARY ARTERY BYPASS GRAFT     • DENTAL SURGERY     • JOINT REPLACEMENT  January 2019    left knee   • KNEE SURGERY     • LYMPH NODE BIOPSY  2003   • LYMPHADENECTOMY     • OTHER SURGICAL HISTORY      MAZE PROCEDURE   • MA ARTHRP KNE CONDYLE&PLATU MEDIAL&LAT COMPARTMENTS Left 01/28/2019    Procedure: ARTHROPLASTY LEFT KNEE TOTAL;  Surgeon: Jaiv Rothman MD;  Location:  MAIN OR;  Service: Orthopedics   • MA LARYNGOSCOPY DIRECT OPERATIVE W/BIOPSY Right 04/27/2022    Procedure: DIRECT LARYNGOSCOPY WITH BIOPSY RIGHT PHARYNX, POSSIBLE RIGHT NECK LYMPH NODE BIOPSY; FROZEN SECTION;  Surgeon: Hector Negro MD;  Location:  MAIN OR; Service: ENT   • SKIN BIOPSY     • UPPER GASTROINTESTINAL ENDOSCOPY  2019    Schatzki ring   • US GUIDED LYMPH NODE BIOPSY RIGHT  2021      Family History   Problem Relation Age of Onset   • Heart block Family    • Coronary artery disease Mother    • Thrombosis Mother    • Hypertension Mother    • Arthritis Mother    • Hyperlipidemia Mother    • Diabetes Father    • Hypertension Father    • Arthritis Father    • Sudden death Father         cardiac   • Diabetes type II Father    • Colon cancer Paternal Grandfather    • Cancer Paternal Grandfather    • Breast cancer Sister    • Heart disease Brother         Coronary stents     Social History     Tobacco Use   • Smoking status: Former     Packs/day: 1.00     Years: 42.00     Total pack years: 42.00     Types: Cigarettes     Start date: 5     Quit date: 2009     Years since quittin.6   • Smokeless tobacco: Never   Substance Use Topics   • Alcohol use: Yes     Alcohol/week: 2.0 standard drinks of alcohol     Types: 2 Cans of beer per week     Comment: very rare "beer here and there"     Allergies   Allergen Reactions   • Ceftin [Cefuroxime] Itching     However, has tolerated Cefazolin and Pip-Tazo since, which have different side chains. Be cautious with / avoid 2nd, 3rd, or 4th Gen Cephs that have similar side chains to Cefuroxime.    • Lantus [Insulin Glargine] Itching       Current Outpatient Medications:   •  aspirin 81 MG tablet, Take 1 tablet (81 mg total) by mouth daily, Disp: , Rfl:   •  atorvastatin (LIPITOR) 40 mg tablet, TAKE 1 TABLET BY MOUTH EVERY DAY, Disp: 90 tablet, Rfl: 3  •  cholecalciferol (VITAMIN D3) 1,000 units tablet, Take 2 tablets (2,000 Units total) by mouth daily, Disp: 60 tablet, Rfl: 0  •  Empagliflozin 25 MG TABS, Take 1 tablet (25 mg total) by mouth every morning, Disp: 90 tablet, Rfl: 1  •  ferrous sulfate 324 (65 Fe) mg, TAKE 1 TABLET BY MOUTH EVERY DAY WITH BREAKFAST, Disp: 90 tablet, Rfl: 0  •  furosemide (LASIX) 20 mg tablet, TAKE 1 TABLET BY MOUTH TWICE A DAY, Disp: 180 tablet, Rfl: 0  •  IMMUNE GLOBULIN, HUMAN, IV, Inject 30,000 mg into a catheter in a vein, Disp: , Rfl:   •  metFORMIN (GLUCOPHAGE-XR) 500 mg 24 hr tablet, TAKE 3 TABLETS (1,500 MG TOTAL) BY MOUTH DAILY WITH BREAKFAST, Disp: 120 tablet, Rfl: 3  •  metoprolol succinate (TOPROL-XL) 100 mg 24 hr tablet, TAKE 1 TABLET BY MOUTH EVERY DAY, Disp: 90 tablet, Rfl: 1  •  montelukast (SINGULAIR) 10 mg tablet, Take 10 mg by mouth daily, Disp: , Rfl:   •  multivitamin-minerals (CENTRUM ADULTS) tablet, Take 1 tablet by mouth daily, Disp:  , Rfl: 0  •  nitroglycerin (NITROSTAT) 0.3 mg SL tablet, Nitrostat 0.3 mg sublingual tablet  Place 1 tablet as needed by sublingual route as needed for 90 days. , Disp: , Rfl:   •  omeprazole (PriLOSEC) 40 MG capsule, TAKE 1 CAPSULE (40 MG TOTAL) BY MOUTH DAILY. , Disp: 90 capsule, Rfl: 2  •  valACYclovir (VALTREX) 500 mg tablet, Take 500 mg by mouth daily, Disp: , Rfl:   •  dexamethasone (DECADRON) 4 mg tablet, TAKE 5 TABLETS BY MOUTH ONCE A WEEK (Patient not taking: Reported on 8/23/2023), Disp: , Rfl:     Review of Systems   Constitutional: Negative for diaphoresis, fatigue and unexpected weight change. Respiratory: Negative for shortness of breath. Cardiovascular: Negative for chest pain and palpitations. Gastrointestinal: Negative for constipation and diarrhea. Endocrine: Positive for polyuria (On lasix). Negative for polydipsia. Physical Exam:  Body mass index is 22.61 kg/m². /60   Pulse 77   Ht 5' 10" (1.778 m)   Wt 71.5 kg (157 lb 9.6 oz)   SpO2 96%   BMI 22.61 kg/m²    Wt Readings from Last 3 Encounters:   08/23/23 71.5 kg (157 lb 9.6 oz)   05/30/23 70.8 kg (156 lb)   05/18/23 72.1 kg (159 lb)       Physical Exam  Constitutional:       Appearance: Normal appearance. Cardiovascular:      Rate and Rhythm: Normal rate and regular rhythm. Pulses: Normal pulses.            Dorsalis pedis pulses are 2+ on the right side and 2+ on the left side. Pulmonary:      Effort: Pulmonary effort is normal.   Abdominal:      General: Abdomen is flat. Palpations: Abdomen is soft. Feet:      Right foot:      Skin integrity: No ulcer, skin breakdown, erythema, warmth, callus or dry skin. Left foot:      Skin integrity: No ulcer, skin breakdown, erythema, warmth, callus or dry skin. Skin:     General: Skin is warm. Capillary Refill: Capillary refill takes less than 2 seconds. Neurological:      General: No focal deficit present. Mental Status: He is alert.        Labs:      Latest Reference Range & Units 02/01/23 08:49 05/01/23 09:30 08/16/23 08:14   Hemoglobin A1C 4.8 - 5.6 % 8.9 (H) 6.2 (H) 5.9 (H)   (H): Data is abnormally high     Latest Reference Range & Units 05/01/23 09:30 08/16/23 08:14   EXT Creatinine Urine Not Estab. mg/dL 76.3 66.5   MICROALBUMIN/CREATININE RATIO 0 - 29 mg/g creat 23 23   MICROALBUM.,U,RANDOM Not Estab. ug/mL 17.2 15.3      Latest Reference Range & Units 05/01/23 09:30 08/16/23 08:14   Cholesterol 100 - 199 mg/dL 119 106   Triglycerides 0 - 149 mg/dL 135 149   HDL >39 mg/dL 34 (L) 34 (L)   LDL Calculated 0 - 99 mg/dL 61 46   VLDL Cholesterol Shaw 5 - 40 mg/dL 24 26   (L): Data is abnormally low     04/14/18 08:44 07/26/19 11:26 03/18/22 11:51   Left Eye Diabetic Retinopathy None (E) None (E) None (E)   (E): External lab result   04/14/18 08:44 07/26/19 11:26 03/18/22 11:51   Right Eye Diabetic Retinopathy None (E) None (E) None (E)   (E): External lab result  Impression & Plan:    Problem List Items Addressed This Visit        Endocrine    Type 2 diabetes mellitus without complication, without long-term current use of insulin (HCC) - Primary (Chronic)    Relevant Orders    Hemoglobin A1C    Comprehensive metabolic panel    Albumin / creatinine urine ratio    Lipid panel       Cardiovascular and Mediastinum    Essential hypertension (Chronic)    Relevant Orders    Hemoglobin A1C Comprehensive metabolic panel    Albumin / creatinine urine ratio    Lipid panel       Other    Dyslipidemia (Chronic)    Relevant Orders    Hemoglobin A1C    Comprehensive metabolic panel    Albumin / creatinine urine ratio    Lipid panel    Hx of CABG    Relevant Orders    Hemoglobin A1C    Comprehensive metabolic panel    Albumin / creatinine urine ratio    Lipid panel       Orders Placed This Encounter   Procedures   • Hemoglobin A1C     Standing Status:   Future     Number of Occurrences:   1     Standing Expiration Date:   8/23/2024   • Comprehensive metabolic panel     This is a patient instruction: Patient fasting for 8 hours or longer recommended. Standing Status:   Future     Number of Occurrences:   1     Standing Expiration Date:   8/23/2024   • Albumin / creatinine urine ratio     Standing Status:   Future     Number of Occurrences:   1     Standing Expiration Date:   8/23/2024   • Lipid panel     This is a patient instruction: This test requires patient fasting for 10-12 hours or longer. Drinking of black coffee or black tea is acceptable. Standing Status:   Future     Number of Occurrences:   1     Standing Expiration Date:   8/23/2024       There are no Patient Instructions on file for this visit. Patient is a 75yM with PMHx of well controlled T2DM since 6 years on metformin/jardiance only with complication on CAD s/p CABG in 2014 with dx of multiple myeloma on chemotherapy with some steriod induced hyperglycemia with other PMHx of hypertension, hyperlipidemia who presents today for Diabetes management. Last visit 05/23. 1) T2DM:- Patients HbA1C continuous to improve down to prediabetic range at 5.9% now. BG reported are in range. No medication side effects. Recommend continuing with current regime. Continue to check BG once a day, reach out to me if having high or low BG.  Repeat labs in 6 months now     Plan   - c/w Metformin 1500mg daily   - c/w jardiance 25mg daily   - Check BG once a day- alternate fasting and bedtime  - Repeat HbA1C in 6 months     Screening  - Retinopathy- uptodate  - Lipid- uptodate 08/23, LDL <100, c/w Lipitor 20mg daily, repeat next visit  - Urine microalbumin- Uptodate 08/23, normal, repeat next visit    2) Hyperlipidemia:-  uptodate 02/23, LDL <100, c/w Lipitor 20mg daily, repeat next visit  3) Hypertension:- BP in clinic controlled at  128/60, c/w toprol     Discussed with the patient and all questioned fully answered. He will call me if any problems arise. Follow-up appointment in 6 months.      Counseled patient on diagnostic results, prognosis, risk and benefit of treatment options, instruction for management, importance of treatment compliance, Risk  factor reduction and impressions      Ramon Farris MD

## 2023-09-03 DIAGNOSIS — K21.00 GASTROESOPHAGEAL REFLUX DISEASE WITH ESOPHAGITIS: ICD-10-CM

## 2023-09-03 DIAGNOSIS — R13.19 ESOPHAGEAL DYSPHAGIA: ICD-10-CM

## 2023-09-03 DIAGNOSIS — K22.2 SCHATZKI'S RING OF DISTAL ESOPHAGUS: ICD-10-CM

## 2023-09-03 DIAGNOSIS — K22.70 BARRETT'S ESOPHAGUS WITHOUT DYSPLASIA: ICD-10-CM

## 2023-09-03 RX ORDER — OMEPRAZOLE 40 MG/1
40 CAPSULE, DELAYED RELEASE ORAL DAILY
Qty: 90 CAPSULE | Refills: 2 | Status: SHIPPED | OUTPATIENT
Start: 2023-09-03

## 2023-09-06 DIAGNOSIS — I48.0 PAROXYSMAL ATRIAL FIBRILLATION (HCC): ICD-10-CM

## 2023-09-06 DIAGNOSIS — I50.32 CHRONIC DIASTOLIC CONGESTIVE HEART FAILURE (HCC): ICD-10-CM

## 2023-09-06 DIAGNOSIS — I10 ESSENTIAL HYPERTENSION: ICD-10-CM

## 2023-09-06 DIAGNOSIS — I25.10 CORONARY ARTERY DISEASE INVOLVING NATIVE CORONARY ARTERY OF NATIVE HEART WITHOUT ANGINA PECTORIS: ICD-10-CM

## 2023-09-06 RX ORDER — METOPROLOL SUCCINATE 100 MG/1
TABLET, EXTENDED RELEASE ORAL
Qty: 90 TABLET | Refills: 1 | Status: SHIPPED | OUTPATIENT
Start: 2023-09-06

## 2023-09-08 DIAGNOSIS — E11.9 TYPE 2 DIABETES MELLITUS WITHOUT COMPLICATION, WITHOUT LONG-TERM CURRENT USE OF INSULIN (HCC): Chronic | ICD-10-CM

## 2023-09-08 RX ORDER — EMPAGLIFLOZIN 25 MG/1
25 TABLET, FILM COATED ORAL EVERY MORNING
Qty: 90 TABLET | Refills: 1 | Status: SHIPPED | OUTPATIENT
Start: 2023-09-08

## 2023-09-28 DIAGNOSIS — D50.9 IRON DEFICIENCY ANEMIA, UNSPECIFIED IRON DEFICIENCY ANEMIA TYPE: ICD-10-CM

## 2023-09-28 DIAGNOSIS — R07.9 CHEST PAIN: ICD-10-CM

## 2023-09-28 RX ORDER — FUROSEMIDE 20 MG/1
TABLET ORAL
Qty: 180 TABLET | Refills: 0 | Status: SHIPPED | OUTPATIENT
Start: 2023-09-28

## 2023-09-28 RX ORDER — FERROUS SULFATE TAB EC 324 MG (65 MG FE EQUIVALENT) 324 (65 FE) MG
TABLET DELAYED RESPONSE ORAL
Qty: 90 TABLET | Refills: 0 | Status: SHIPPED | OUTPATIENT
Start: 2023-09-28

## 2023-11-17 NOTE — PROGRESS NOTES
Assessment/Plan:    DM type 2 (diabetes mellitus, type 2) ([facility] )  DM type 2 with hyperglycemia - uncontrolled with A1C 7 8 - going to be starting radiation soon and want sugars better controlled - urged to con't watching sugars/carbs and keep active and will increase Metformin ER to 750 mg 1 tab PO q day, will recheck BW in 3 mos - order given, UTD on foot and eye exam, on ARB and statin and ASA    Hypertension  BP upper end of nml for DM but acceptable, con't current regimen for now, urged to watch sodium in diet and keep active    Dyslipidemia  TC/LDL at goal - cont' current statin, urged healthy diet and exercise    Multiple myeloma not having achieved remission ([place] [address] )  Newly dx, as per pt he had PET and BM bx and no sign of mets noted, going to be starting radiation tx, call with any issues, con't f/u and tx as per Onc       Diagnoses and all orders for this visit:    Multiple myeloma not having achieved remission ([place] [id number] )  -     Glucose, fasting; Future  -     HEMOGLOBIN A1C W/ EAG ESTIMATION; Future  -     TSH, 3rd generation with T4 reflex; Future  -     Glucose, fasting  -     HEMOGLOBIN A1C W/ EAG ESTIMATION  -     TSH, 3rd generation with T4 reflex    Type 2 diabetes mellitus without complication, without long-term current use of insulin (HCC)  -     metFORMIN (GLUCOPHAGE-XR) 750 mg 24 hr tablet; Take 1 tablet (750 mg total) by mouth daily with dinner for 30 days  -     Glucose, fasting; Future  -     HEMOGLOBIN A1C W/ EAG ESTIMATION; Future  -     TSH, 3rd generation with T4 reflex; Future  -     Glucose, fasting  -     HEMOGLOBIN A1C W/ EAG ESTIMATION  -     TSH, 3rd generation with T4 reflex    Dyslipidemia  -     Glucose, fasting; Future  -     HEMOGLOBIN A1C W/ EAG ESTIMATION; Future  -     TSH, 3rd generation with T4 reflex; Future  -     Glucose, fasting  -     HEMOGLOBIN A1C W/ EAG ESTIMATION  -     TSH, 3rd generation with T4 reflex    Essential hypertension  -     Glucose, fasting;  Future  - HEMOGLOBIN A1C W/ EAG ESTIMATION; Future  -     TSH, 3rd generation with T4 reflex; Future  -     Glucose, fasting  -     HEMOGLOBIN A1C W/ EAG ESTIMATION  -     TSH, 3rd generation with T4 reflex    Elevated TSH  Comments:  Currently clinically euthyroid, monitor TFT's closely for now      [facility] 2015 - every 3 yrs - will d/w pt after radiation finished    Subjective:      Patient ID: Delfino Paulino is a 71 y o  male  HPI pt here for follow up appt    Pt noted "seeing something out of his R eye"  He thought it was a stye and saw the eye doc and they sent him to a specialist   He had a biopsy done and was told it was myeloma  He had a BM bx and PET scan and was told it had not spread any where else  He is now scheduled for 20 radiation treatments  BW results were d/w pt in detail: FBS/A1C slightly better/stable at 198/7 8, rest of CMP and PSA was wnl, FLP with elevated TG and low HDL but TC/LDL at goal on current statin, TSH slightly elevated at 5 150 but FT4 wnl, CBC with anemia at 12 5/37 0 but rest of panel was wnl     Def of controlled vs uncontrolled DM was reviewed  Diet was reviewed - wgt down 3 lbs from Nov 2017  He is taking his DM meds as directed  He is UTD on foot and eye exam   He is on statin and ARB  Goal FLP was reviewed with pt in detail  He is taking statin daily as directed w/o SE  He notes no recent stroke/TIA symptoms/CP  Nml TFT's reviewed  Pt has never been on thyroid replacement  He notes no significant wgt changes/C/D/skin changes other then noted above  BP upper end of nml for DM today  Meds were reviewed and no changes have occurred  He denies missing doses of meds or SE with the meds  He does not check his BP outside the office  He notes no frequent Ha's/dizziness/double vision/CP         Review of Systems      Objective:      /72   Pulse 66   Temp 97 8 °F (36 6 °C)   Ht 5' 11" (1 803 m)   Wt 88 kg (194 lb)   BMI 27 06 kg/m²          Physical Exam Statement Selected

## 2023-11-18 ENCOUNTER — APPOINTMENT (INPATIENT)
Dept: RADIOLOGY | Facility: HOSPITAL | Age: 78
DRG: 871 | End: 2023-11-18
Payer: COMMERCIAL

## 2023-11-18 ENCOUNTER — HOSPITAL ENCOUNTER (INPATIENT)
Facility: HOSPITAL | Age: 78
LOS: 2 days | Discharge: HOME | DRG: 871 | End: 2023-11-20
Attending: EMERGENCY MEDICINE | Admitting: STUDENT IN AN ORGANIZED HEALTH CARE EDUCATION/TRAINING PROGRAM
Payer: COMMERCIAL

## 2023-11-18 DIAGNOSIS — J18.9 PNEUMONIA: Primary | ICD-10-CM

## 2023-11-18 DIAGNOSIS — R13.13 PHARYNGEAL DYSPHAGIA: ICD-10-CM

## 2023-11-18 DIAGNOSIS — A41.9 SEPSIS (HCC): ICD-10-CM

## 2023-11-18 DIAGNOSIS — C90.00 MULTIPLE MYELOMA NOT HAVING ACHIEVED REMISSION (HCC): ICD-10-CM

## 2023-11-18 DIAGNOSIS — A41.9 SEPSIS WITHOUT ACUTE ORGAN DYSFUNCTION, DUE TO UNSPECIFIED ORGANISM (HCC): ICD-10-CM

## 2023-11-18 DIAGNOSIS — J18.9 MULTIFOCAL PNEUMONIA: ICD-10-CM

## 2023-11-18 LAB
ANION GAP SERPL CALCULATED.3IONS-SCNC: 8 MMOL/L
BASOPHILS # BLD AUTO: 0.08 THOUSANDS/ÂΜL (ref 0–0.1)
BASOPHILS NFR BLD AUTO: 1 % (ref 0–1)
BUN SERPL-MCNC: 17 MG/DL (ref 5–25)
CALCIUM SERPL-MCNC: 9.3 MG/DL (ref 8.4–10.2)
CHLORIDE SERPL-SCNC: 100 MMOL/L (ref 96–108)
CO2 SERPL-SCNC: 28 MMOL/L (ref 21–32)
CREAT SERPL-MCNC: 0.98 MG/DL (ref 0.6–1.3)
EOSINOPHIL # BLD AUTO: 0.06 THOUSAND/ÂΜL (ref 0–0.61)
EOSINOPHIL NFR BLD AUTO: 0 % (ref 0–6)
ERYTHROCYTE [DISTWIDTH] IN BLOOD BY AUTOMATED COUNT: 15.4 % (ref 11.6–15.1)
GFR SERPL CREATININE-BSD FRML MDRD: 74 ML/MIN/1.73SQ M
GLUCOSE SERPL-MCNC: 104 MG/DL (ref 65–140)
GLUCOSE SERPL-MCNC: 129 MG/DL (ref 65–140)
HCT VFR BLD AUTO: 41.6 % (ref 36.5–49.3)
HGB BLD-MCNC: 13.3 G/DL (ref 12–17)
IMM GRANULOCYTES # BLD AUTO: 0.06 THOUSAND/UL (ref 0–0.2)
IMM GRANULOCYTES NFR BLD AUTO: 0 % (ref 0–2)
LACTATE SERPL-SCNC: 0.9 MMOL/L (ref 0.5–2)
LYMPHOCYTES # BLD AUTO: 1.98 THOUSANDS/ÂΜL (ref 0.6–4.47)
LYMPHOCYTES NFR BLD AUTO: 15 % (ref 14–44)
MCH RBC QN AUTO: 28.2 PG (ref 26.8–34.3)
MCHC RBC AUTO-ENTMCNC: 32 G/DL (ref 31.4–37.4)
MCV RBC AUTO: 88 FL (ref 82–98)
MONOCYTES # BLD AUTO: 1.16 THOUSAND/ÂΜL (ref 0.17–1.22)
MONOCYTES NFR BLD AUTO: 9 % (ref 4–12)
NEUTROPHILS # BLD AUTO: 10.07 THOUSANDS/ÂΜL (ref 1.85–7.62)
NEUTS SEG NFR BLD AUTO: 75 % (ref 43–75)
NRBC BLD AUTO-RTO: 0 /100 WBCS
PLATELET # BLD AUTO: 153 THOUSANDS/UL (ref 149–390)
PMV BLD AUTO: 9.8 FL (ref 8.9–12.7)
POTASSIUM SERPL-SCNC: 3.6 MMOL/L (ref 3.5–5.3)
PROCALCITONIN SERPL-MCNC: 0.32 NG/ML
RBC # BLD AUTO: 4.72 MILLION/UL (ref 3.88–5.62)
S PYO DNA THROAT QL NAA+PROBE: NOT DETECTED
SODIUM SERPL-SCNC: 136 MMOL/L (ref 135–147)
WBC # BLD AUTO: 13.41 THOUSAND/UL (ref 4.31–10.16)

## 2023-11-18 PROCEDURE — 99223 1ST HOSP IP/OBS HIGH 75: CPT | Performed by: STUDENT IN AN ORGANIZED HEALTH CARE EDUCATION/TRAINING PROGRAM

## 2023-11-18 PROCEDURE — 87651 STREP A DNA AMP PROBE: CPT | Performed by: EMERGENCY MEDICINE

## 2023-11-18 PROCEDURE — 87449 NOS EACH ORGANISM AG IA: CPT | Performed by: STUDENT IN AN ORGANIZED HEALTH CARE EDUCATION/TRAINING PROGRAM

## 2023-11-18 PROCEDURE — 93005 ELECTROCARDIOGRAM TRACING: CPT

## 2023-11-18 PROCEDURE — 99285 EMERGENCY DEPT VISIT HI MDM: CPT | Performed by: EMERGENCY MEDICINE

## 2023-11-18 PROCEDURE — 80048 BASIC METABOLIC PNL TOTAL CA: CPT | Performed by: EMERGENCY MEDICINE

## 2023-11-18 PROCEDURE — 0202U NFCT DS 22 TRGT SARS-COV-2: CPT | Performed by: STUDENT IN AN ORGANIZED HEALTH CARE EDUCATION/TRAINING PROGRAM

## 2023-11-18 PROCEDURE — 84145 PROCALCITONIN (PCT): CPT | Performed by: EMERGENCY MEDICINE

## 2023-11-18 PROCEDURE — 36415 COLL VENOUS BLD VENIPUNCTURE: CPT | Performed by: EMERGENCY MEDICINE

## 2023-11-18 PROCEDURE — 71045 X-RAY EXAM CHEST 1 VIEW: CPT

## 2023-11-18 PROCEDURE — 94664 DEMO&/EVAL PT USE INHALER: CPT

## 2023-11-18 PROCEDURE — 85025 COMPLETE CBC W/AUTO DIFF WBC: CPT | Performed by: EMERGENCY MEDICINE

## 2023-11-18 PROCEDURE — 87081 CULTURE SCREEN ONLY: CPT | Performed by: STUDENT IN AN ORGANIZED HEALTH CARE EDUCATION/TRAINING PROGRAM

## 2023-11-18 PROCEDURE — 82948 REAGENT STRIP/BLOOD GLUCOSE: CPT

## 2023-11-18 PROCEDURE — 99284 EMERGENCY DEPT VISIT MOD MDM: CPT

## 2023-11-18 PROCEDURE — 83605 ASSAY OF LACTIC ACID: CPT | Performed by: EMERGENCY MEDICINE

## 2023-11-18 PROCEDURE — 87040 BLOOD CULTURE FOR BACTERIA: CPT | Performed by: EMERGENCY MEDICINE

## 2023-11-18 RX ORDER — MONTELUKAST SODIUM 10 MG/1
10 TABLET ORAL DAILY
Status: DISCONTINUED | OUTPATIENT
Start: 2023-11-19 | End: 2023-11-20 | Stop reason: HOSPADM

## 2023-11-18 RX ORDER — PANTOPRAZOLE SODIUM 40 MG/1
40 TABLET, DELAYED RELEASE ORAL
Status: DISCONTINUED | OUTPATIENT
Start: 2023-11-19 | End: 2023-11-20 | Stop reason: HOSPADM

## 2023-11-18 RX ORDER — INSULIN LISPRO 100 [IU]/ML
1-5 INJECTION, SOLUTION INTRAVENOUS; SUBCUTANEOUS
Status: DISCONTINUED | OUTPATIENT
Start: 2023-11-18 | End: 2023-11-20 | Stop reason: HOSPADM

## 2023-11-18 RX ORDER — VALACYCLOVIR HYDROCHLORIDE 500 MG/1
500 TABLET, FILM COATED ORAL DAILY
Status: DISCONTINUED | OUTPATIENT
Start: 2023-11-19 | End: 2023-11-20 | Stop reason: HOSPADM

## 2023-11-18 RX ORDER — ATORVASTATIN CALCIUM 40 MG/1
40 TABLET, FILM COATED ORAL DAILY
Status: DISCONTINUED | OUTPATIENT
Start: 2023-11-19 | End: 2023-11-20 | Stop reason: HOSPADM

## 2023-11-18 RX ORDER — POLYETHYLENE GLYCOL 3350 17 G/17G
17 POWDER, FOR SOLUTION ORAL DAILY PRN
Status: DISCONTINUED | OUTPATIENT
Start: 2023-11-18 | End: 2023-11-20 | Stop reason: HOSPADM

## 2023-11-18 RX ORDER — CEFEPIME HYDROCHLORIDE 2 G/50ML
2000 INJECTION, SOLUTION INTRAVENOUS EVERY 12 HOURS
Status: DISCONTINUED | OUTPATIENT
Start: 2023-11-18 | End: 2023-11-19

## 2023-11-18 RX ORDER — LEVOFLOXACIN 5 MG/ML
750 INJECTION, SOLUTION INTRAVENOUS ONCE
Status: COMPLETED | OUTPATIENT
Start: 2023-11-18 | End: 2023-11-18

## 2023-11-18 RX ORDER — DEXAMETHASONE 4 MG/1
4 TABLET ORAL DAILY
Status: DISCONTINUED | OUTPATIENT
Start: 2023-11-19 | End: 2023-11-19

## 2023-11-18 RX ORDER — MAGNESIUM HYDROXIDE/ALUMINUM HYDROXICE/SIMETHICONE 120; 1200; 1200 MG/30ML; MG/30ML; MG/30ML
30 SUSPENSION ORAL EVERY 6 HOURS PRN
Status: DISCONTINUED | OUTPATIENT
Start: 2023-11-18 | End: 2023-11-20 | Stop reason: HOSPADM

## 2023-11-18 RX ORDER — ACETAMINOPHEN 325 MG/1
650 TABLET ORAL EVERY 6 HOURS PRN
Status: DISCONTINUED | OUTPATIENT
Start: 2023-11-18 | End: 2023-11-20 | Stop reason: HOSPADM

## 2023-11-18 RX ORDER — CEFEPIME HYDROCHLORIDE 2 G/1
2000 INJECTION, POWDER, FOR SOLUTION INTRAVENOUS EVERY 12 HOURS
Status: DISCONTINUED | OUTPATIENT
Start: 2023-11-18 | End: 2023-11-18 | Stop reason: CLARIF

## 2023-11-18 RX ORDER — METOPROLOL SUCCINATE 50 MG/1
100 TABLET, EXTENDED RELEASE ORAL DAILY
Status: DISCONTINUED | OUTPATIENT
Start: 2023-11-19 | End: 2023-11-20 | Stop reason: HOSPADM

## 2023-11-18 RX ORDER — BENZONATATE 100 MG/1
100 CAPSULE ORAL 3 TIMES DAILY PRN
Status: DISCONTINUED | OUTPATIENT
Start: 2023-11-18 | End: 2023-11-20 | Stop reason: HOSPADM

## 2023-11-18 RX ORDER — HEPARIN SODIUM 5000 [USP'U]/ML
5000 INJECTION, SOLUTION INTRAVENOUS; SUBCUTANEOUS EVERY 8 HOURS SCHEDULED
Status: DISCONTINUED | OUTPATIENT
Start: 2023-11-18 | End: 2023-11-20 | Stop reason: HOSPADM

## 2023-11-18 RX ORDER — GUAIFENESIN 600 MG/1
600 TABLET, EXTENDED RELEASE ORAL EVERY 12 HOURS SCHEDULED
Status: DISCONTINUED | OUTPATIENT
Start: 2023-11-18 | End: 2023-11-20 | Stop reason: HOSPADM

## 2023-11-18 RX ADMIN — HEPARIN SODIUM 5000 UNITS: 5000 INJECTION INTRAVENOUS; SUBCUTANEOUS at 21:06

## 2023-11-18 RX ADMIN — GUAIFENESIN 600 MG: 600 TABLET ORAL at 21:06

## 2023-11-18 RX ADMIN — VANCOMYCIN HYDROCHLORIDE 1750 MG: 5 INJECTION, POWDER, LYOPHILIZED, FOR SOLUTION INTRAVENOUS at 18:25

## 2023-11-18 RX ADMIN — LEVOFLOXACIN 750 MG: 5 INJECTION, SOLUTION INTRAVENOUS at 16:15

## 2023-11-18 RX ADMIN — SODIUM CHLORIDE 1000 ML: 0.9 INJECTION, SOLUTION INTRAVENOUS at 16:16

## 2023-11-18 RX ADMIN — CEFEPIME HYDROCHLORIDE 2000 MG: 2 INJECTION, SOLUTION INTRAVENOUS at 17:33

## 2023-11-18 NOTE — ASSESSMENT & PLAN NOTE
Pt presenting from the urgent care d/t fever sore throat and cough  CXR from urgent care showing multifocal PNA Covid test done at [facility] negative    Pt meeting SIRS with tachypnea and leukocytosis  Source: PNA  LA WNL  Procal elevated-> downward trending  Given levaquin and 1L bolus  PCR Strept A negative     BC x2 pending  CXR final read pending  RPP negative  Urine Ag negative  Mrsa cx pending  Sputum cx pending  IS ACP mucinex tessalon  Vanc and cefepime day 1  Consult pulm and ID d/t immunocompromised state

## 2023-11-18 NOTE — ASSESSMENT & PLAN NOTE
Lab Results   Component Value Date    HGBA1C 5.9 (H) 08/16/2023       Recent Labs     11/18/23  1721   POCGLU 129       Blood Sugar Average: Last 72 hrs:  (P) 129  A1c current  ISS  Hold oral meds  ADA diet  Hypoglycemic protocol

## 2023-11-18 NOTE — ED PROVIDER NOTES
History  Chief Complaint   Patient presents with    Fever     Pt sent over from . Pt reports waking up this morning with a fever and sore throat. Slight cough. Hx from patient and medical records. Woke this morning with fever cough sore throat - cxr at urgent care with bilateral pneumonia. - received chemo for MM. Last chemo 2 weeks ago. Prior to Admission Medications   Prescriptions Last Dose Informant Patient Reported? Taking? IMMUNE GLOBULIN, HUMAN, IV Past Month Self Yes Yes   Sig: Inject 30,000 mg into a catheter in a vein   Jardiance 25 MG TABS 11/18/2023  No Yes   Sig: TAKE 1 TABLET BY MOUTH EVERY DAY IN THE MORNING   aspirin 81 MG tablet 11/18/2023 Self Yes Yes   Sig: Take 1 tablet (81 mg total) by mouth daily   atorvastatin (LIPITOR) 40 mg tablet 11/18/2023  No Yes   Sig: TAKE 1 TABLET BY MOUTH EVERY DAY   cholecalciferol (VITAMIN D3) 1,000 units tablet 11/18/2023 Self No Yes   Sig: Take 2 tablets (2,000 Units total) by mouth daily   dexamethasone (DECADRON) 4 mg tablet Past Month  Yes Yes   ferrous sulfate 324 (65 Fe) mg 11/18/2023  No Yes   Sig: TAKE 1 TABLET BY MOUTH EVERY DAY WITH BREAKFAST   furosemide (LASIX) 20 mg tablet 11/18/2023  No Yes   Sig: TAKE 1 TABLET BY MOUTH TWICE A DAY   metFORMIN (GLUCOPHAGE-XR) 500 mg 24 hr tablet 11/18/2023  No Yes   Sig: TAKE 3 TABLETS (1,500 MG TOTAL) BY MOUTH DAILY WITH BREAKFAST   metoprolol succinate (TOPROL-XL) 100 mg 24 hr tablet 11/18/2023  No Yes   Sig: TAKE 1 TABLET BY MOUTH EVERY DAY   montelukast (SINGULAIR) 10 mg tablet 11/18/2023  Yes Yes   Sig: Take 10 mg by mouth daily   multivitamin-minerals (CENTRUM ADULTS) tablet 11/18/2023 Self No Yes   Sig: Take 1 tablet by mouth daily   nitroglycerin (NITROSTAT) 0.3 mg SL tablet Not Taking Self Yes No   Sig: Nitrostat 0.3 mg sublingual tablet   Place 1 tablet as needed by sublingual route as needed for 90 days.    Patient not taking: Reported on 11/18/2023   omeprazole (PriLOSEC) 40 MG capsule 11/18/2023  No Yes   Sig: TAKE 1 CAPSULE (40 MG TOTAL) BY MOUTH DAILY.    valACYclovir (VALTREX) 500 mg tablet 11/18/2023 Self Yes Yes   Sig: Take 500 mg by mouth daily      Facility-Administered Medications: None       Past Medical History:   Diagnosis Date    Acute respiratory failure with hypoxia ([address]) 04/20/2022    Arthritis     Cancer ([address])     Cataract     Colitis     Colon polyp     Fatty liver     History of chemotherapy     History of radiation therapy     History of shingles 2018    History of transfusion     Hyperlipidemia     Hypertension        Past Surgical History:   Procedure Laterality Date    AORTIC VALVE REPLACEMENT      CARDIAC VALVE REPLACEMENT  2014    aorta    CATARACT EXTRACTION Bilateral     COLECTOMY      COLONOSCOPY  11/2019    Prior right hemicolectomy    CORONARY ARTERY BYPASS GRAFT      DENTAL SURGERY      JOINT REPLACEMENT  January 2019    left knee    KNEE SURGERY      LYMPH NODE BIOPSY  2003    LYMPHADENECTOMY      OTHER SURGICAL HISTORY      MAZE PROCEDURE    NV ARTHRP KNE CONDYLE&PLATU MEDIAL&LAT COMPARTMENTS Left 01/28/2019    Procedure: ARTHROPLASTY LEFT KNEE TOTAL;  Surgeon: Lena Pineda MD;  Location:  MAIN OR;  Service: Orthopedics    NV LARYNGOSCOPY DIRECT OPERATIVE W/BIOPSY Right 04/27/2022    Procedure: DIRECT LARYNGOSCOPY WITH BIOPSY RIGHT PHARYNX, POSSIBLE RIGHT NECK LYMPH NODE BIOPSY; FROZEN SECTION;  Surgeon: Ruth Stanton MD;  Location:  MAIN OR;  Service: ENT    SKIN BIOPSY      UPPER GASTROINTESTINAL ENDOSCOPY  11/2019    Schatzki ring    US GUIDED LYMPH NODE BIOPSY RIGHT  12/14/2021       Family History   Problem Relation Age of Onset    Heart block Family     Coronary artery disease Mother     Thrombosis Mother     Hypertension Mother     Arthritis Mother     Hyperlipidemia Mother     Diabetes Father     Hypertension Father     Arthritis Father     Sudden death Father         cardiac    Diabetes type II Father     Colon cancer Paternal Grandfather     Cancer Paternal Grandfather     Breast cancer Sister     Heart disease Brother         Coronary stents     I have reviewed and agree with the history as documented. E-Cigarette/Vaping    E-Cigarette Use Never User      E-Cigarette/Vaping Substances    Nicotine No     THC No     CBD No     Flavoring No     Other No     Unknown No      Social History     Tobacco Use    Smoking status: Former     Packs/day: 1.00     Years: 42.00     Total pack years: 42.00     Types: Cigarettes     Start date: 5     Quit date: 2009     Years since quittin.8    Smokeless tobacco: Never   Vaping Use    Vaping Use: Never used   Substance Use Topics    Alcohol use: Yes     Alcohol/week: 2.0 standard drinks of alcohol     Types: 2 Cans of beer per week     Comment: very rare "beer here and there"    Drug use: No       Review of Systems   Constitutional:  Positive for fever. Negative for chills. HENT:  Positive for sore throat. Negative for ear pain. Eyes:  Negative for pain and visual disturbance. Respiratory:  Positive for cough and shortness of breath. Cardiovascular:  Negative for chest pain and palpitations. Gastrointestinal:  Negative for abdominal pain and vomiting. Genitourinary:  Negative for dysuria and hematuria. Musculoskeletal:  Negative for arthralgias and back pain. Skin:  Negative for color change and rash. Neurological:  Negative for seizures and syncope. All other systems reviewed and are negative. Physical Exam  Physical Exam  Vitals and nursing note reviewed. Constitutional:       General: He is not in acute distress. Appearance: He is well-developed. HENT:      Head: Normocephalic and atraumatic. Eyes:      Conjunctiva/sclera: Conjunctivae normal.   Cardiovascular:      Rate and Rhythm: Normal rate and regular rhythm. Heart sounds: No murmur heard. Pulmonary:      Effort: Pulmonary effort is normal. No respiratory distress. Breath sounds: Rhonchi present. Abdominal:      Palpations: Abdomen is soft. Tenderness: There is no abdominal tenderness. Musculoskeletal:         General: No swelling. Cervical back: Neck supple. Skin:     General: Skin is warm and dry. Capillary Refill: Capillary refill takes less than 2 seconds. Neurological:      Mental Status: He is alert.    Psychiatric:         Mood and Affect: Mood normal.         Vital Signs  ED Triage Vitals   Temperature Pulse Respirations Blood Pressure SpO2   11/18/23 1413 11/18/23 1412 11/18/23 1412 11/18/23 1412 11/18/23 1412   98.4 °F (36.9 °C) 77 (!) 24 110/53 96 %      Temp Source Heart Rate Source Patient Position - Orthostatic VS BP Location FiO2 (%)   11/18/23 1412 11/18/23 1412 -- -- --   Oral Monitor         Pain Score       11/18/23 1948       No Pain           Vitals:    11/18/23 1706 11/18/23 1706 11/18/23 1948 11/18/23 2108   BP: 118/55 118/55 118/55 142/65   Pulse: 68 64  72         Visual Acuity      ED Medications  Medications   guaiFENesin (MUCINEX) 12 hr tablet 600 mg (600 mg Oral Given 11/18/23 2106)   benzonatate (TESSALON PERLES) capsule 100 mg (has no administration in time range)   insulin lispro (HumaLOG) 100 units/mL subcutaneous injection 1-5 Units ( Subcutaneous Not Given 11/18/23 1721)   insulin lispro (HumaLOG) 100 units/mL subcutaneous injection 1-5 Units ( Subcutaneous Not Given 11/18/23 2106)   cefepime (MAXIPIME) IVPB (premix in dextrose) 2,000 mg 50 mL (2,000 mg Intravenous New Bag 11/18/23 1733)   acetaminophen (TYLENOL) tablet 650 mg (has no administration in time range)   polyethylene glycol (MIRALAX) packet 17 g (has no administration in time range)   trimethobenzamide (TIGAN) IM injection 200 mg (has no administration in time range)   aspirin (ECOTRIN LOW STRENGTH) EC tablet 81 mg (has no administration in time range)   atorvastatin (LIPITOR) tablet 40 mg (has no administration in time range)   dexamethasone (DECADRON) tablet 4 mg (has no administration in time range)   metoprolol succinate (TOPROL-XL) 24 hr tablet 100 mg (has no administration in time range)   montelukast (SINGULAIR) tablet 10 mg (has no administration in time range)   pantoprazole (PROTONIX) EC tablet 40 mg (has no administration in time range)   valACYclovir (VALTREX) tablet 500 mg (has no administration in time range)   aluminum-magnesium hydroxide-simethicone (MAALOX) oral suspension 30 mL (has no administration in time range)   heparin (porcine) subcutaneous injection 5,000 Units (5,000 Units Subcutaneous Given 11/18/23 2106)   vancomycin (VANCOCIN) 1,250 mg in sodium chloride 0.9 % 250 mL IVPB (has no administration in time range)   levofloxacin (LEVAQUIN) IVPB (premix in dextrose) 750 mg 150 mL (750 mg Intravenous New Bag 11/18/23 1615)   sodium chloride 0.9 % bolus 1,000 mL (1,000 mL Intravenous New Bag 11/18/23 1616)   vancomycin (VANCOCIN) 1,750 mg in sodium chloride 0.9 % 500 mL IVPB (1,750 mg Intravenous New Bag 11/18/23 1825)       Diagnostic Studies  Results Reviewed       Procedure Component Value Units Date/Time    MRSA culture [[id number]] Collected: 11/18/23 1725    Lab Status: In process Specimen: Nares from Nose Updated: 11/18/23 1728    Respiratory Panel 2. 1(RP2)with COVID19 [[id number]] Collected: 11/18/23 1725    Lab Status: In process Specimen: Nasopharyngeal Swab Updated: 11/18/23 1728    Strep A PCR [[id number]]  (Normal) Collected: 11/18/23 1630    Lab Status: Final result Specimen: Throat Updated: 11/18/23 1703     STREP A PCR Not Detected    Lactic acid, plasma (w/reflex if result > 2.0) [[id number]]  (Normal) Collected: 11/18/23 1614    Lab Status: Final result Specimen: Blood from Arm, Left Updated: 11/18/23 1637     LACTIC ACID 0.9 mmol/L     Narrative:      Result may be elevated if tourniquet was used during collection.     Sputum culture and Gram stain [[id number]]     Lab Status: No result Specimen: Sputum     Blood culture #2 [[id number]] Collected: 11/18/23 1614    Lab Status: In process Specimen: Blood from Arm, Right Updated: 11/18/23 1619    Blood culture #1 [[id number]] Collected: 11/18/23 1614    Lab Status:  In process Specimen: Blood from Arm, Left Updated: 11/18/23 1619    Procalcitonin [[id number]]  (Abnormal) Collected: 11/18/23 1419    Lab Status: Final result Specimen: Blood from Arm, Right Updated: 11/18/23 1601     Procalcitonin 0.32 ng/ml     Basic metabolic panel [[id number]] Collected: 11/18/23 1419    Lab Status: Final result Specimen: Blood from Arm, Right Updated: 11/18/23 1440     Sodium 136 mmol/L      Potassium 3.6 mmol/L      Chloride 100 mmol/L      CO2 28 mmol/L      ANION GAP 8 mmol/L      BUN 17 mg/dL      Creatinine 0.98 mg/dL      Glucose 104 mg/dL      Calcium 9.3 mg/dL      eGFR 74 ml/min/1.73sq m     Narrative:      [facility] guidelines for Chronic Kidney Disease (CKD):     Stage 1 with normal or high GFR (GFR > 90 mL/min/1.73 square meters)    Stage 2 Mild CKD (GFR = 60-89 mL/min/1.73 square meters)    Stage 3A Moderate CKD (GFR = 45-59 mL/min/1.73 square meters)    Stage 3B Moderate CKD (GFR = 30-44 mL/min/1.73 square meters)    Stage 4 Severe CKD (GFR = 15-29 mL/min/1.73 square meters)    Stage 5 End Stage CKD (GFR <15 mL/min/1.73 square meters)  Note: GFR calculation is accurate only with a steady state creatinine    CBC and differential [[id number]]  (Abnormal) Collected: 11/18/23 1419    Lab Status: Final result Specimen: Blood from Arm, Right Updated: 11/18/23 1426     WBC 13.41 Thousand/uL      RBC 4.72 Million/uL      Hemoglobin 13.3 g/dL      Hematocrit 41.6 %      MCV 88 fL      MCH 28.2 pg      MCHC 32.0 g/dL      RDW 15.4 %      MPV 9.8 fL      Platelets 973 Thousands/uL      nRBC 0 /100 WBCs      Neutrophils Relative 75 %      Immat GRANS % 0 %      Lymphocytes Relative 15 %      Monocytes Relative 9 %      Eosinophils Relative 0 %      Basophils Relative 1 %      Neutrophils Absolute 10.07 Thousands/µL Immature Grans Absolute 0.06 Thousand/uL      Lymphocytes Absolute 1.98 Thousands/µL      Monocytes Absolute 1.16 Thousand/µL      Eosinophils Absolute 0.06 Thousand/µL      Basophils Absolute 0.08 Thousands/µL                    XR chest portable    (Results Pending)              Procedures  Procedures         ED Course                            Initial Sepsis Screening       Row Name 11/18/23 1612                Is the patient's history suggestive of a new or worsening infection? Yes (Proceed)  -EVERETT        Suspected source of infection pneumonia  -EVERETT        Indicate SIRS criteria Tachypnea > 20 resp per min;Leukocytosis (WBC > 17166 IJL) OR Leukopenia (WBC <4000 IJL) OR Bandemia (WBC >10% bands)  -EVERETT        Are two or more of the above signs & symptoms of infection both present and new to the patient? Yes (Proceed)  -EVERETT        Assess for evidence of organ dysfunction: Are any of the below criteria present within 6 hours of suspected infection and SIRS criteria that are NOT considered to be chronic conditions? --                  User Key  (r) = Recorded By, (t) = Taken By, (c) = Cosigned By      [address] [place] [address] Name Provider Type    [address] [place] [address],  Physician                    lactate nl, no sign of end organ damage, less likely severe sepsis              Medical Decision Making  Differential includes pneumonia bilateral with sirs - sepsis. Lactate pending. Also on chemo so immunocompromised, will require admission for iv abx. Outpatient studies did not reveal rsv, covid or flu. Amount and/or Complexity of Data Reviewed  Labs: ordered. Risk  Prescription drug management. Decision regarding hospitalization.              Disposition  Final diagnoses:   Pneumonia   Sepsis ([address])     Time reflects when diagnosis was documented in both MDM as applicable and the Disposition within this note       Time User Action Codes Description Comment    11/18/2023  4:14 PM Radu Moore Add [J18.9] Pneumonia     11/18/2023  4:14 PM Hung Dewey Add [A41.9] Sepsis ([address])     11/18/2023  4:33 PM Yessenia Alberto Add [J18.9] Multifocal pneumonia     11/18/2023  4:33 PM Yessenia Alberto Screws Add [A41.9] Sepsis without acute organ dysfunction, due to unspecified organism ([address])     11/18/2023  4:33 PM Yessenia Alberto Add [C90.00] Multiple myeloma not having achieved remission [facility])           ED Disposition       ED Disposition   Admit    Condition   Stable    Date/Time   Sat Nov 18, 2023  4:14 PM    Comment   Case was discussed with SORIN Alberto and the patient's admission status was agreed to be Admission Status: inpatient status to the service of Dr. Michelle Garcia .                Follow-up Information    None         Current Discharge Medication List        CONTINUE these medications which have NOT CHANGED    Details   aspirin 81 MG tablet Take 1 tablet (81 mg total) by mouth daily    Associated Diagnoses: Primary localized osteoarthritis of left knee      atorvastatin (LIPITOR) 40 mg tablet TAKE 1 TABLET BY MOUTH EVERY DAY  Qty: 90 tablet, Refills: 3    Associated Diagnoses: Coronary artery disease involving native coronary artery of native heart without angina pectoris      cholecalciferol (VITAMIN D3) 1,000 units tablet Take 2 tablets (2,000 Units total) by mouth daily  Qty: 60 tablet, Refills: 0    Associated Diagnoses: COVID-19      dexamethasone (DECADRON) 4 mg tablet       ferrous sulfate 324 (65 Fe) mg TAKE 1 TABLET BY MOUTH EVERY DAY WITH BREAKFAST  Qty: 90 tablet, Refills: 0    Associated Diagnoses: Iron deficiency anemia, unspecified iron deficiency anemia type      furosemide (LASIX) 20 mg tablet TAKE 1 TABLET BY MOUTH TWICE A DAY  Qty: 180 tablet, Refills: 0    Associated Diagnoses: Chest pain      IMMUNE GLOBULIN, HUMAN, IV Inject 30,000 mg into a catheter in a vein      Jardiance 25 MG TABS TAKE 1 TABLET BY MOUTH EVERY DAY IN THE MORNING  Qty: 90 tablet, Refills: 1    Associated Diagnoses: Type 2 diabetes mellitus without complication, without long-term current use of insulin ([facility])      metFORMIN (GLUCOPHAGE-XR) 500 mg 24 hr tablet TAKE 3 TABLETS (1,500 MG TOTAL) BY MOUTH DAILY WITH BREAKFAST  Qty: 120 tablet, Refills: 3    Associated Diagnoses: Type 2 diabetes mellitus without complication, without long-term current use of insulin ([facility])      metoprolol succinate (TOPROL-XL) 100 mg 24 hr tablet TAKE 1 TABLET BY MOUTH EVERY DAY  Qty: 90 tablet, Refills: 1    Associated Diagnoses: Coronary artery disease involving native coronary artery of native heart without angina pectoris; Paroxysmal atrial fibrillation ([address]); Chronic diastolic congestive heart failure ([address]); Essential hypertension      montelukast (SINGULAIR) 10 mg tablet Take 10 mg by mouth daily      multivitamin-minerals (CENTRUM ADULTS) tablet Take 1 tablet by mouth daily  Qty:  , Refills: 0    Associated Diagnoses: COVID-19      omeprazole (PriLOSEC) 40 MG capsule TAKE 1 CAPSULE (40 MG TOTAL) BY MOUTH DAILY. Qty: 90 capsule, Refills: 2    Associated Diagnoses: Gastroesophageal reflux disease with esophagitis; Roberts's esophagus without dysplasia; Esophageal dysphagia; Schatzki's ring of distal esophagus      valACYclovir (VALTREX) 500 mg tablet Take 500 mg by mouth daily      nitroglycerin (NITROSTAT) 0.3 mg SL tablet Nitrostat 0.3 mg sublingual tablet   Place 1 tablet as needed by sublingual route as needed for 90 days. No discharge procedures on file.     PDMP Review         Value Time User    PDMP Reviewed  Yes 12/9/2022  8:38 AM Daymon Skiff, MD            ED Provider  Electronically Signed by             Roseann Michael DO  11/18/23 6013

## 2023-11-18 NOTE — PLAN OF CARE
Problem: PAIN - ADULT  Goal: Verbalizes/displays adequate comfort level or baseline comfort level  Description: Interventions:  - Encourage patient to monitor pain and request assistance  - Assess pain using appropriate pain scale  - Administer analgesics based on type and severity of pain and evaluate response  - Implement non-pharmacological measures as appropriate and evaluate response  - Consider cultural and social influences on pain and pain management  - Notify physician/advanced practitioner if interventions unsuccessful or patient reports new pain  Outcome: Progressing     Problem: INFECTION - ADULT  Goal: Absence or prevention of progression during hospitalization  Description: INTERVENTIONS:  - Assess and monitor for signs and symptoms of infection  - Monitor lab/diagnostic results  - Monitor all insertion sites, i.e. indwelling lines, tubes, and drains  - Monitor endotracheal if appropriate and nasal secretions for changes in amount and color  - [place] appropriate cooling/warming therapies per order  - Administer medications as ordered  - Instruct and encourage patient and family to use good hand hygiene technique  - Identify and instruct in appropriate isolation precautions for identified infection/condition  Outcome: Progressing  Goal: Absence of fever/infection during neutropenic period  Description: INTERVENTIONS:  - Monitor WBC    Outcome: Progressing     Problem: SAFETY ADULT  Goal: Patient will remain free of falls  Description: INTERVENTIONS:  - Educate patient/family on patient safety including physical limitations  - Instruct patient to call for assistance with activity   - Consult OT/PT to assist with strengthening/mobility   - Keep Call bell within reach  - Keep bed low and locked with side rails adjusted as appropriate  - Keep care items and personal belongings within reach  - Initiate and maintain comfort rounds  - Make Fall Risk Sign visible to staff  - Offer Toileting every 2 Hours, in advance of need  - Obtain necessary fall risk management equipment:  - Apply yellow socks and bracelet for high fall risk patients  - Consider moving patient to room near nurses station  Outcome: Progressing  Goal: Maintain or return to baseline ADL function  Description: INTERVENTIONS:  -  Assess patient's ability to carry out ADLs; assess patient's baseline for ADL function and identify physical deficits which impact ability to perform ADLs (bathing, care of mouth/teeth, toileting, grooming, dressing, etc.)  - Assess/evaluate cause of self-care deficits   - Assess range of motion  - Assess patient's mobility; develop plan if impaired  - Assess patient's need for assistive devices and provide as appropriate  - Encourage maximum independence but intervene and supervise when necessary  - Involve family in performance of ADLs  - Assess for home care needs following discharge   - Consider OT consult to assist with ADL evaluation and planning for discharge  - Provide patient education as appropriate  Outcome: Progressing  Goal: Maintains/Returns to pre admission functional level  Description: INTERVENTIONS:  - Perform AM-PAC 6 Click Basic Mobility/ Daily Activity assessment daily.  - Set and communicate daily mobility goal to care team and patient/family/caregiver. - Collaborate with rehabilitation services on mobility goals if consulted  - Perform Range of Motion 3 times a day. - Reposition patient every 3 hours.   - Dangle patient 3 times a day  - Stand patient 3 times a day  - Ambulate patient 3 times a day  - Out of bed to chair 3 times a day   - Out of bed for meals 3 times a day  - Out of bed for toileting  - Record patient progress and toleration of activity level   Outcome: Progressing     Problem: DISCHARGE PLANNING  Goal: Discharge to home or other facility with appropriate resources  Description: INTERVENTIONS:  - Identify barriers to discharge w/patient and caregiver  - Arrange for needed discharge resources and transportation as appropriate  - Identify discharge learning needs (meds, wound care, etc.)  - Arrange for interpretive services to assist at discharge as needed  - Refer to Case Management Department for coordinating discharge planning if the patient needs post-hospital services based on physician/advanced practitioner order or complex needs related to functional status, cognitive ability, or social support system  Outcome: Progressing     Problem: Knowledge Deficit  Goal: Patient/family/caregiver demonstrates understanding of disease process, treatment plan, medications, and discharge instructions  Description: Complete learning assessment and assess knowledge base.   Interventions:  - Provide teaching at level of understanding  - Provide teaching via preferred learning methods  Outcome: Progressing

## 2023-11-18 NOTE — ASSESSMENT & PLAN NOTE
Wt Readings from Last 3 Encounters:   08/23/23 71.5 kg (157 lb 9.6 oz)   05/30/23 70.8 kg (156 lb)   05/18/23 72.1 kg (159 lb)     Patient euvolemic on presentation  Echo done on 5/2/2022 showing an LVEF of 90% systolic function low normal grade 2 diastolic dysfunction aortic valve bioprosthetic valve  I's and O's  Daily weights  Diabetic low-salt diet  We will hold Lasix for now due to sepsis.   May resume on 11/20/2023

## 2023-11-18 NOTE — ASSESSMENT & PLAN NOTE
Wt Readings from Last 3 Encounters:   08/23/23 71.5 kg (157 lb 9.6 oz)   05/30/23 70.8 kg (156 lb)   05/18/23 72.1 kg (159 lb)     Patient euvolemic on presentation  Echo done on 5/2/2022 showing an LVEF of 55% systolic function low normal grade 2 diastolic dysfunction aortic valve bioprosthetic valve  I's and O's  Daily weights  Diabetic low-salt diet  We will hold Lasix for now due to sepsis.   May resume on 11/19/2023

## 2023-11-18 NOTE — RESPIRATORY THERAPY NOTE
RT Protocol Note  Chris Wilkins 69 y.o. male MRN: [id number]  Unit/Bed#: -01 Encounter: [id number]    Assessment    Principal Problem:    Sepsis without acute organ dysfunction [facility])  Active Problems:    Type 2 diabetes mellitus without complication, without long-term current use of insulin (HCC)    Dyslipidemia    Multiple myeloma not having achieved remission (HCC)    Essential hypertension    Chronic diastolic congestive heart failure (HCC)    Paroxysmal atrial fibrillation (HCC)    Multifocal pneumonia      Home Pulmonary Medications:  none       Past Medical History:   Diagnosis Date    Acute respiratory failure with hypoxia ([address]) 2022    Arthritis     Cancer ([address])     Cataract     Colitis     Colon polyp     Fatty liver     History of chemotherapy     History of radiation therapy     History of shingles 2018    History of transfusion     Hyperlipidemia     Hypertension      Social History     Socioeconomic History    Marital status: /Civil Union     Spouse name: None    Number of children: None    Years of education: None    Highest education level: None   Occupational History    Occupation: WORKING FULLTIME    Tobacco Use    Smoking status: Former     Packs/day: 1.00     Years: 42.00     Total pack years: 42.00     Types: Cigarettes     Start date: 5     Quit date: 2009     Years since quittin.8    Smokeless tobacco: Never   Vaping Use    Vaping Use: Never used   Substance and Sexual Activity    Alcohol use:  Yes     Alcohol/week: 2.0 standard drinks of alcohol     Types: 2 Cans of beer per week     Comment: very rare "beer here and there"    Drug use: No    Sexual activity: Not Currently     Partners: Female     Birth control/protection: None   Other Topics Concern    None   Social History Narrative    None     Social Determinants of Health     Financial Resource Strain: Low Risk  (5/15/2023)    Overall Financial Resource Strain (CARDIA)     Difficulty of Paying Living Expenses: Not hard at all   Food Insecurity: No Food Insecurity (3/24/2023)    Hunger Vital Sign     Worried About Running Out of Food in the Last Year: Never true     Ran Out of Food in the Last Year: Never true   Transportation Needs: No Transportation Needs (5/15/2023)    PRAPARE - Transportation     Lack of Transportation (Medical): No     Lack of Transportation (Non-Medical): No   Physical Activity: Not on file   Stress: Not on file   Social Connections: Not on file   Intimate Partner Violence: Not on file   Housing Stability: Low Risk  (3/24/2023)    Housing Stability Vital Sign     Unable to Pay for Housing in the Last Year: No     Number of Places Lived in the Last Year: 1     Unstable Housing in the Last Year: No       Subjective         Objective    Physical Exam:   Assessment Type: (P) Assess only  General Appearance: (P) Awake, Alert  Respiratory Pattern: (P) Normal  Chest Assessment: (P) Chest expansion symmetrical  Bilateral Breath Sounds: (P) Clear  Cough: (P) None  O2 Device: (P) RA    Vitals:  Blood pressure 118/55, pulse 64, temperature 98.4 °F (36.9 °C), temperature source Oral, resp. rate (!) 24, SpO2 97 %. Imaging and other studies: I have personally reviewed pertinent reports.       O2 Device: (P) RA     Plan       Airway Clearance Plan: (P) Incentive Spirometer     Resp Comments: (P) physician ordered IS/plan to continue with current treatment plan

## 2023-11-18 NOTE — ASSESSMENT & PLAN NOTE
Follows with [facility] oncology  Hold daratumumab Velcade  Receives dexamethasone with infusion -> hold

## 2023-11-18 NOTE — ASSESSMENT & PLAN NOTE
Pt presenting from the urgent care d/t fever sore throat and cough  CXR from urgent care showing multifocal PNA Covid test done at [facility] negative    Pt meeting SIRS with tachypnea and leukocytosis  Source: PNA  LA pending  Procal elevated  Given levaquin and 1L bolus  PCR Strept A pending    BC x2 pending  LA pending  CXR  RPP  Urine Ag  Mrsa cx  Sputum cx  IS ACP mucinex tessalon  Vanc and cefepime  Consult pulm and ID d/t immunocompromised state

## 2023-11-18 NOTE — ASSESSMENT & PLAN NOTE
Follows with [place] oncology  Hold daratumumab Velcade  Continue dexamethasone  Defer stress dose steroids at this time

## 2023-11-18 NOTE — SEPSIS NOTE
Sepsis Note   Iker Whyte 69 y.o. male MRN: [id number]  Unit/Bed#: TV64A Encounter: [id number]       Initial Sepsis Screening       [address] Name 11/18/23 1612                Is the patient's history suggestive of a new or worsening infection? Yes (Proceed)  -EVERETT        Suspected source of infection pneumonia  -EVERETT        Indicate SIRS criteria Tachypnea > 20 resp per min;Leukocytosis (WBC > 70037 IJL) OR Leukopenia (WBC <4000 IJL) OR Bandemia (WBC >10% bands)  -EVERETT        Are two or more of the above signs & symptoms of infection both present and new to the patient? Yes (Proceed)  -EVERETT        Assess for evidence of organ dysfunction: Are any of the below criteria present within 6 hours of suspected infection and SIRS criteria that are NOT considered to be chronic conditions? --                  User Key  (r) = Recorded By, (t) = Taken By, (c) = Cosigned By      [address] [place] [address] Name Provider Type    [address] [place] [address],  Physician                        There is no height or weight on file to calculate BMI.   Wt Readings from Last 1 Encounters:   08/23/23 71.5 kg (157 lb 9.6 oz)        Ideal body weight: 73 kg (160 lb 15 oz)

## 2023-11-18 NOTE — ASSESSMENT & PLAN NOTE
Lab Results   Component Value Date    HGBA1C 5.9 (H) 08/16/2023       No results for input(s): "POCGLU" in the last 72 hours.     Blood Sugar Average: Last 72 hrs:    A1c current  ISS  Hold oral meds  ADA diet  Hypoglycemic protocol

## 2023-11-18 NOTE — H&P
[facility]  H&P  Name: Letty Banks 69 y.o. male I MRN: [id number]  Unit/Bed#: -01 I Date of Admission: 11/18/2023   Date of Service: 11/18/2023 I Hospital Day: 0      Assessment/Plan   * Sepsis without acute organ dysfunction [facility])  Assessment & Plan  Pt presenting from the urgent care d/t fever sore throat and cough  CXR from urgent care showing multifocal PNA Covid test done at [facility] negative    Pt meeting SIRS with tachypnea and leukocytosis  Source: PNA  LA pending  Procal elevated  Given levaquin and 1L bolus  PCR Strept A pending    BC x2 pending  LA pending  CXR  RPP  Urine Ag  Mrsa cx  Sputum cx  IS ACP mucinex tessalon  Vanc and cefepime  Consult pulm and ID d/t immunocompromised state     Multifocal pneumonia  Assessment & Plan  See management above    Paroxysmal atrial fibrillation ([address])  Assessment & Plan  RC metoprolol-> continue  AC not on any     Chronic diastolic congestive heart failure (HCC)  Assessment & Plan  Wt Readings from Last 3 Encounters:   08/23/23 71.5 kg (157 lb 9.6 oz)   05/30/23 70.8 kg (156 lb)   05/18/23 72.1 kg (159 lb)     Patient euvolemic on presentation  Echo done on 5/2/2022 showing an LVEF of 35% systolic function low normal grade 2 diastolic dysfunction aortic valve bioprosthetic valve  I's and O's  Daily weights  Diabetic low-salt diet  We will hold Lasix for now due to sepsis.   May resume on 11/19/2023        Essential hypertension  Assessment & Plan  Continue metoprolol  We will hold Lasix due to sepsis potentially resume tomorrow 11/19/2023    Multiple myeloma not having achieved remission [facility])  Assessment & Plan  Follows with [facility] oncology  Hold daratumumab Velcade  Continue dexamethasone  Defer stress dose steroids at this time     Dyslipidemia  Assessment & Plan  Continue statin    Type 2 diabetes mellitus without complication, without long-term current use of insulin [facility])  Assessment & Plan  Lab Results   Component Value Date    HGBA1C 5.9 (H) 08/16/2023       No results for input(s): "POCGLU" in the last 72 hours. Blood Sugar Average: Last 72 hrs:    A1c current  ISS  Hold oral meds  ADA diet  Hypoglycemic protocol        VTE Pharmacologic Prophylaxis: VTE Score: 7 High Risk (Score >/= 5) - Pharmacological DVT Prophylaxis Ordered: heparin. Sequential Compression Devices Ordered. Code Status: Prior full code  Discussion with family: Updated  (wife) at bedside. Anticipated Length of Stay: Patient will be admitted on an inpatient basis with an anticipated length of stay of greater than 2 midnights secondary to PNA. Total Time Spent on Date of Encounter in care of patient: 65 mins. This time was spent on one or more of the following: performing physical exam; counseling and coordination of care; obtaining or reviewing history; documenting in the medical record; reviewing/ordering tests, medications or procedures; communicating with other healthcare professionals and discussing with patient's family/caregivers. Chief Complaint: sore throat cough fever    History of Present Illness:  Chris Wilkins is a 69 y.o. male with a PMH of MM, HTN, HLD, DM, CHF, Afib who presents with a cough, sore throat and fever. Presented to the urgent care chest x-ray was done as well as COVID test and was shown to have multifocal pneumonia. Patient came to the ED. Patient met SIRS criteria with tachypnea and leukocytosis with the source being pneumonia Pro-Shaw elevated lactic acid pending was given Levaquin and 1L bolus all other symptoms on review of systems was negative. Review of Systems:  Review of Systems   All other systems reviewed and are negative.       Past Medical and Surgical History:   Past Medical History:   Diagnosis Date    Acute respiratory failure with hypoxia ([address]) 04/20/2022    Arthritis     Cancer (HCC)     Cataract     Colitis     Colon polyp     Fatty liver     History of chemotherapy     History of radiation therapy     History of shingles 2018    History of transfusion     Hyperlipidemia     Hypertension        Past Surgical History:   Procedure Laterality Date    AORTIC VALVE REPLACEMENT      CARDIAC VALVE REPLACEMENT  2014    aorta    CATARACT EXTRACTION Bilateral     COLECTOMY      COLONOSCOPY  11/2019    Prior right hemicolectomy    CORONARY ARTERY BYPASS GRAFT      DENTAL SURGERY      JOINT REPLACEMENT  January 2019    left knee    KNEE SURGERY      LYMPH NODE BIOPSY  2003    LYMPHADENECTOMY      OTHER SURGICAL HISTORY      MAZE PROCEDURE    ND ARTHRP KNE CONDYLE&PLATU MEDIAL&LAT COMPARTMENTS Left 01/28/2019    Procedure: ARTHROPLASTY LEFT KNEE TOTAL;  Surgeon: Sunni Espinoza MD;  Location:  MAIN OR;  Service: Orthopedics    ND LARYNGOSCOPY DIRECT OPERATIVE W/BIOPSY Right 04/27/2022    Procedure: DIRECT LARYNGOSCOPY WITH BIOPSY RIGHT PHARYNX, POSSIBLE RIGHT NECK LYMPH NODE BIOPSY; FROZEN SECTION;  Surgeon: Kishore Davila MD;  Location:  MAIN OR;  Service: ENT    SKIN BIOPSY      UPPER GASTROINTESTINAL ENDOSCOPY  11/2019    Schatzki ring    US GUIDED LYMPH NODE BIOPSY RIGHT  12/14/2021       Meds/Allergies:  Prior to Admission medications    Medication Sig Start Date End Date Taking?  Authorizing Provider   aspirin 81 MG tablet Take 1 tablet (81 mg total) by mouth daily 3/29/19   Bright Blackwood MD   atorvastatin (LIPITOR) 40 mg tablet TAKE 1 TABLET BY MOUTH EVERY DAY 7/21/23   Bright Blackwood MD   cholecalciferol (VITAMIN D3) 1,000 units tablet Take 2 tablets (2,000 Units total) by mouth daily 4/11/21   Nikia Perez DO   dexamethasone (DECADRON) 4 mg tablet TAKE 5 TABLETS BY MOUTH ONCE A WEEK  Patient not taking: Reported on 8/23/2023 5/12/23   Historical Provider, MD   ferrous sulfate 324 (65 Fe) mg TAKE 1 TABLET BY MOUTH EVERY DAY WITH BREAKFAST 9/28/23   Branden Galicia DO   furosemide (LASIX) 20 mg tablet TAKE 1 TABLET BY MOUTH TWICE A DAY 9/28/23   Branden Galicia DO   IMMUNE GLOBULIN, HUMAN, IV Inject 30,000 mg into a catheter in a vein    Historical Provider, MD   Jardiance 25 MG TABS TAKE 1 TABLET BY MOUTH EVERY DAY IN THE MORNING 9/8/23   Meaghan Murillo MD   metFORMIN (GLUCOPHAGE-XR) 500 mg 24 hr tablet TAKE 3 TABLETS (1,500 MG TOTAL) BY MOUTH DAILY WITH BREAKFAST 7/2/23   Yoseph Rosales DO   metoprolol succinate (TOPROL-XL) 100 mg 24 hr tablet TAKE 1 TABLET BY MOUTH EVERY DAY 9/6/23   Yoseph Rosales DO   montelukast (SINGULAIR) 10 mg tablet Take 10 mg by mouth daily 4/9/23   Historical Provider, MD   multivitamin-minerals (CENTRUM ADULTS) tablet Take 1 tablet by mouth daily 4/13/21   Chante Soria DO   nitroglycerin (NITROSTAT) 0.3 mg SL tablet Nitrostat 0.3 mg sublingual tablet   Place 1 tablet as needed by sublingual route as needed for 90 days. Historical Provider, MD   omeprazole (PriLOSEC) 40 MG capsule TAKE 1 CAPSULE (40 MG TOTAL) BY MOUTH DAILY. 9/3/23   Yoseph Rosales DO   valACYclovir (VALTREX) 500 mg tablet Take 500 mg by mouth daily 5/27/22   Historical Provider, MD GIPSON have reviewed home medications using recent Epic encounter. Allergies: Allergies   Allergen Reactions    Ceftin [Cefuroxime] Itching     However, has tolerated Cefazolin and Pip-Tazo since, which have different side chains. Be cautious with / avoid 2nd, 3rd, or 4th Gen Cephs that have similar side chains to Cefuroxime.     Lantus [Insulin Glargine] Itching       Social History:  Marital Status: /Civil Union   Patient Pre-hospital Living Situation: Home  Patient Pre-hospital Level of Mobility: walks  Patient Pre-hospital Diet Restrictions: none  Substance Use History:   Social History     Substance and Sexual Activity   Alcohol Use Yes    Alcohol/week: 2.0 standard drinks of alcohol    Types: 2 Cans of beer per week    Comment: very rare "beer here and there"     Social History     Tobacco Use   Smoking Status Former    Packs/day: 1.00    Years: 42.00    Total pack years: 42.00    Types: Cigarettes    Start date: 5    Quit date: 2009    Years since quittin.8   Smokeless Tobacco Never     Social History     Substance and Sexual Activity   Drug Use No       Family History:  Family History   Problem Relation Age of Onset    Heart block Family     Coronary artery disease Mother     Thrombosis Mother     Hypertension Mother     Arthritis Mother     Hyperlipidemia Mother     Diabetes Father     Hypertension Father     Arthritis Father     Sudden death Father         cardiac    Diabetes type II Father     Colon cancer Paternal Grandfather     Cancer Paternal Grandfather     Breast cancer Sister     Heart disease Brother         Coronary stents       Physical Exam:     Vitals:   Blood Pressure: 118/55 (23 1706)  Pulse: 64 (23 1706)  Temperature: 98.4 °F (36.9 °C) (23 1413)  Temp Source: Oral (23 1413)  Respirations: (!) 24 (23 1412)  SpO2: 97 % (23 1706)    Physical Exam  Vitals and nursing note reviewed. Constitutional:       General: He is not in acute distress. Appearance: He is ill-appearing (chronically). Comments: Thin frail   HENT:      Head: Normocephalic and atraumatic. Cardiovascular:      Rate and Rhythm: Normal rate and regular rhythm. Pulses: Normal pulses. Heart sounds: Normal heart sounds. Pulmonary:      Effort: Pulmonary effort is normal.      Breath sounds: Rhonchi present. Abdominal:      General: Abdomen is flat. Bowel sounds are normal.      Palpations: Abdomen is soft. Musculoskeletal:      Right lower leg: No edema. Left lower leg: No edema. Skin:     General: Skin is warm. Neurological:      General: No focal deficit present. Mental Status: He is alert and oriented to person, place, and time.           Additional Data:     Lab Results:  Results from last 7 days   Lab Units 23  1419   WBC Thousand/uL 13.41*   HEMOGLOBIN g/dL 13.3   HEMATOCRIT % 41.6   PLATELETS Thousands/uL 153   NEUTROS PCT % 75   LYMPHS PCT % 15   MONOS PCT % 9   EOS PCT % 0     Results from last 7 days   Lab Units 11/18/23  1419   SODIUM mmol/L 136   POTASSIUM mmol/L 3.6   CHLORIDE mmol/L 100   CO2 mmol/L 28   BUN mg/dL 17   CREATININE mg/dL 0.98   ANION GAP mmol/L 8   CALCIUM mg/dL 9.3   GLUCOSE RANDOM mg/dL 104                 Results from last 7 days   Lab Units 11/18/23  1614 11/18/23  1419   LACTIC ACID mmol/L 0.9  --    PROCALCITONIN ng/ml  --  0.32*       Lines/Drains:  Invasive Devices       Peripheral Intravenous Line  Duration             Peripheral IV 11/18/23 Left Antecubital <1 day                        Imaging: No pertinent imaging reviewed. XR chest portable    (Results Pending)       EKG and Other Studies Reviewed on Admission:   EKG: No EKG obtained. ** Please Note: This note has been constructed using a voice recognition system.  **

## 2023-11-19 LAB
ANION GAP SERPL CALCULATED.3IONS-SCNC: 7 MMOL/L
B PARAP IS1001 DNA NPH QL NAA+NON-PROBE: NOT DETECTED
B PERT.PT PRMT NPH QL NAA+NON-PROBE: NOT DETECTED
BUN SERPL-MCNC: 14 MG/DL (ref 5–25)
C PNEUM DNA NPH QL NAA+NON-PROBE: NOT DETECTED
CALCIUM SERPL-MCNC: 8.6 MG/DL (ref 8.4–10.2)
CHLORIDE SERPL-SCNC: 104 MMOL/L (ref 96–108)
CO2 SERPL-SCNC: 26 MMOL/L (ref 21–32)
CREAT SERPL-MCNC: 0.82 MG/DL (ref 0.6–1.3)
ERYTHROCYTE [DISTWIDTH] IN BLOOD BY AUTOMATED COUNT: 15.5 % (ref 11.6–15.1)
FLUAV RNA NPH QL NAA+NON-PROBE: NOT DETECTED
FLUBV RNA NPH QL NAA+NON-PROBE: NOT DETECTED
GFR SERPL CREATININE-BSD FRML MDRD: 85 ML/MIN/1.73SQ M
GLUCOSE SERPL-MCNC: 111 MG/DL (ref 65–140)
GLUCOSE SERPL-MCNC: 131 MG/DL (ref 65–140)
GLUCOSE SERPL-MCNC: 163 MG/DL (ref 65–140)
GLUCOSE SERPL-MCNC: 203 MG/DL (ref 65–140)
GLUCOSE SERPL-MCNC: 93 MG/DL (ref 65–140)
GLUCOSE SERPL-MCNC: 98 MG/DL (ref 65–140)
HADV DNA NPH QL NAA+NON-PROBE: NOT DETECTED
HCOV 229E RNA NPH QL NAA+NON-PROBE: NOT DETECTED
HCOV HKU1 RNA NPH QL NAA+NON-PROBE: NOT DETECTED
HCOV NL63 RNA NPH QL NAA+NON-PROBE: NOT DETECTED
HCOV OC43 RNA NPH QL NAA+NON-PROBE: NOT DETECTED
HCT VFR BLD AUTO: 38.7 % (ref 36.5–49.3)
HGB BLD-MCNC: 12.4 G/DL (ref 12–17)
HMPV RNA NPH QL NAA+NON-PROBE: NOT DETECTED
HPIV1 RNA NPH QL NAA+NON-PROBE: NOT DETECTED
HPIV2 RNA NPH QL NAA+NON-PROBE: NOT DETECTED
HPIV3 RNA NPH QL NAA+NON-PROBE: NOT DETECTED
HPIV4 RNA NPH QL NAA+NON-PROBE: NOT DETECTED
L PNEUMO1 AG UR QL IA.RAPID: NEGATIVE
M PNEUMO DNA NPH QL NAA+NON-PROBE: NOT DETECTED
MAGNESIUM SERPL-MCNC: 1.9 MG/DL (ref 1.9–2.7)
MCH RBC QN AUTO: 27.9 PG (ref 26.8–34.3)
MCHC RBC AUTO-ENTMCNC: 32 G/DL (ref 31.4–37.4)
MCV RBC AUTO: 87 FL (ref 82–98)
PLATELET # BLD AUTO: 139 THOUSANDS/UL (ref 149–390)
PMV BLD AUTO: 9.8 FL (ref 8.9–12.7)
POTASSIUM SERPL-SCNC: 3.5 MMOL/L (ref 3.5–5.3)
PROCALCITONIN SERPL-MCNC: 0.22 NG/ML
QRS AXIS: 59 DEGREES
QRSD INTERVAL: 136 MS
QT INTERVAL: 470 MS
QTC INTERVAL: 503 MS
RBC # BLD AUTO: 4.44 MILLION/UL (ref 3.88–5.62)
RSV RNA NPH QL NAA+NON-PROBE: NOT DETECTED
RV+EV RNA NPH QL NAA+NON-PROBE: NOT DETECTED
S PNEUM AG UR QL: NEGATIVE
SARS-COV-2 RNA NPH QL NAA+NON-PROBE: NOT DETECTED
SODIUM SERPL-SCNC: 137 MMOL/L (ref 135–147)
T WAVE AXIS: 46 DEGREES
VENTRICULAR RATE: 69 BPM
WBC # BLD AUTO: 8.28 THOUSAND/UL (ref 4.31–10.16)

## 2023-11-19 PROCEDURE — 85027 COMPLETE CBC AUTOMATED: CPT | Performed by: STUDENT IN AN ORGANIZED HEALTH CARE EDUCATION/TRAINING PROGRAM

## 2023-11-19 PROCEDURE — 93010 ELECTROCARDIOGRAM REPORT: CPT | Performed by: INTERNAL MEDICINE

## 2023-11-19 PROCEDURE — 82948 REAGENT STRIP/BLOOD GLUCOSE: CPT

## 2023-11-19 PROCEDURE — 99223 1ST HOSP IP/OBS HIGH 75: CPT | Performed by: NURSE PRACTITIONER

## 2023-11-19 PROCEDURE — 99223 1ST HOSP IP/OBS HIGH 75: CPT | Performed by: STUDENT IN AN ORGANIZED HEALTH CARE EDUCATION/TRAINING PROGRAM

## 2023-11-19 PROCEDURE — 84145 PROCALCITONIN (PCT): CPT | Performed by: STUDENT IN AN ORGANIZED HEALTH CARE EDUCATION/TRAINING PROGRAM

## 2023-11-19 PROCEDURE — 80048 BASIC METABOLIC PNL TOTAL CA: CPT | Performed by: STUDENT IN AN ORGANIZED HEALTH CARE EDUCATION/TRAINING PROGRAM

## 2023-11-19 PROCEDURE — 83735 ASSAY OF MAGNESIUM: CPT | Performed by: STUDENT IN AN ORGANIZED HEALTH CARE EDUCATION/TRAINING PROGRAM

## 2023-11-19 PROCEDURE — 99233 SBSQ HOSP IP/OBS HIGH 50: CPT | Performed by: STUDENT IN AN ORGANIZED HEALTH CARE EDUCATION/TRAINING PROGRAM

## 2023-11-19 RX ORDER — CEFEPIME HYDROCHLORIDE 2 G/50ML
2000 INJECTION, SOLUTION INTRAVENOUS EVERY 8 HOURS
Status: DISCONTINUED | OUTPATIENT
Start: 2023-11-19 | End: 2023-11-20 | Stop reason: HOSPADM

## 2023-11-19 RX ORDER — MAGNESIUM SULFATE 1 G/100ML
1 INJECTION INTRAVENOUS ONCE
Status: COMPLETED | OUTPATIENT
Start: 2023-11-19 | End: 2023-11-19

## 2023-11-19 RX ORDER — POTASSIUM CHLORIDE 20 MEQ/1
40 TABLET, EXTENDED RELEASE ORAL 2 TIMES DAILY
Status: COMPLETED | OUTPATIENT
Start: 2023-11-19 | End: 2023-11-19

## 2023-11-19 RX ADMIN — HEPARIN SODIUM 5000 UNITS: 5000 INJECTION INTRAVENOUS; SUBCUTANEOUS at 14:45

## 2023-11-19 RX ADMIN — ASPIRIN 81 MG: 81 TABLET, COATED ORAL at 09:26

## 2023-11-19 RX ADMIN — MONTELUKAST 10 MG: 10 TABLET, FILM COATED ORAL at 09:26

## 2023-11-19 RX ADMIN — CEFEPIME HYDROCHLORIDE 2000 MG: 2 INJECTION, SOLUTION INTRAVENOUS at 04:17

## 2023-11-19 RX ADMIN — ATORVASTATIN CALCIUM 40 MG: 40 TABLET, FILM COATED ORAL at 09:26

## 2023-11-19 RX ADMIN — VANCOMYCIN HYDROCHLORIDE 1250 MG: 5 INJECTION, POWDER, LYOPHILIZED, FOR SOLUTION INTRAVENOUS at 17:15

## 2023-11-19 RX ADMIN — HEPARIN SODIUM 5000 UNITS: 5000 INJECTION INTRAVENOUS; SUBCUTANEOUS at 20:46

## 2023-11-19 RX ADMIN — CEFEPIME HYDROCHLORIDE 2000 MG: 2 INJECTION, SOLUTION INTRAVENOUS at 20:46

## 2023-11-19 RX ADMIN — GUAIFENESIN 600 MG: 600 TABLET ORAL at 20:46

## 2023-11-19 RX ADMIN — MAGNESIUM SULFATE IN DEXTROSE 1 G: 10 INJECTION, SOLUTION INTRAVENOUS at 09:19

## 2023-11-19 RX ADMIN — PANTOPRAZOLE SODIUM 40 MG: 40 TABLET, DELAYED RELEASE ORAL at 04:16

## 2023-11-19 RX ADMIN — VALACYCLOVIR HYDROCHLORIDE 500 MG: 500 TABLET, FILM COATED ORAL at 09:26

## 2023-11-19 RX ADMIN — POTASSIUM CHLORIDE 40 MEQ: 1500 TABLET, EXTENDED RELEASE ORAL at 09:27

## 2023-11-19 RX ADMIN — GUAIFENESIN 600 MG: 600 TABLET ORAL at 09:26

## 2023-11-19 RX ADMIN — POTASSIUM CHLORIDE 40 MEQ: 1500 TABLET, EXTENDED RELEASE ORAL at 17:15

## 2023-11-19 RX ADMIN — INSULIN LISPRO 1 UNITS: 100 INJECTION, SOLUTION INTRAVENOUS; SUBCUTANEOUS at 11:59

## 2023-11-19 RX ADMIN — INSULIN LISPRO 1 UNITS: 100 INJECTION, SOLUTION INTRAVENOUS; SUBCUTANEOUS at 16:50

## 2023-11-19 RX ADMIN — METOPROLOL SUCCINATE 100 MG: 50 TABLET, EXTENDED RELEASE ORAL at 09:26

## 2023-11-19 RX ADMIN — CEFEPIME HYDROCHLORIDE 2000 MG: 2 INJECTION, SOLUTION INTRAVENOUS at 14:45

## 2023-11-19 RX ADMIN — HEPARIN SODIUM 5000 UNITS: 5000 INJECTION INTRAVENOUS; SUBCUTANEOUS at 04:17

## 2023-11-19 NOTE — CONSULTS
Consultation - Pulmonary Medicine   Yessi Mauricio 69 y.o. male MRN: [id number]  Unit/Bed#: -01 Encounter: [id number]      Assessment/Plan:    Fever with abnormal CXR with multifocal opacities concerning for recurrent multifocal pneumonia in an immunocompromised patient, at risk for resistant GNRs and MRSA   Continue antibiotics as ordered by primary team with infectious disease consult pending. Follow-up culture data. Sputum culture if able. Repeat chest x-ray in 4 to 6 weeks. Dysphagia with esophageal stricture   Discussed with primary team.   Consider GI evaluation given dysphagia symptoms and recurrent pneumonia. History of [facility]   Diagnosed by bronchoscopy in 05/2021. Minimal hemoptysis prior to admission that is now resolved. Multiple Myeloma   Last PET scan in June showed no active disease. He is on maintenance chemotherapy, following with [facility]. He also reports immune deficiency and is on IVIG as an outpatient. D/W primary team.    History of Present Illness   Physician Requesting Consult: Gwendolyn Rand MD  Reason for Consult / Principal Problem: Multifocal pneumonia  Hx and PE limited by: None  Chief Complaint: "I had a fever."  HPI: Yessi Mauricio is a 69 y.o.  male who presented to [address] [place] [address] with complaints of fever and mild cough with some blood-tinged sputum. Ashish Bertrand reports that the symptoms started about 2 days ago. He reports that given his immunocompromise state, he has always been advised to be evaluated. He had a chest x-ray showing pneumonia and he was admitted. He reports that with the institution of antibiotics, he feels remarkably better. His cough has resolved and he denies any shortness of breath. He was ambulating around his room upon my visit. He does tell me that he is immunocompromised and maintains on IVIG. He is also on maintenance chemotherapy for his multiple myeloma.   Aside from the above, he reports that he has had some intermittent issues with dysphagia. He has had esophageal stricture in the past and was dilated many years ago. He reports that his symptoms have been worse recently. Inpatient consult to Pulmonology  Consult performed by: DUNG Avendano  Consult ordered by: Neli Olmedo MD        Review of Systems   All other systems reviewed and are negative. A full 12-point review of systems was completed and is negative except for those outlined in the HPI.     Historical Information   Past Medical History:   Diagnosis Date    Acute respiratory failure with hypoxia ([address]) 04/20/2022    Arthritis     Cancer ([address])     Cataract     Colitis     Colon polyp     Fatty liver     History of chemotherapy     History of radiation therapy     History of shingles 2018    History of transfusion     Hyperlipidemia     Hypertension      Past Surgical History:   Procedure Laterality Date    AORTIC VALVE REPLACEMENT      CARDIAC VALVE REPLACEMENT  2014    aorta    CATARACT EXTRACTION Bilateral     COLECTOMY      COLONOSCOPY  11/2019    Prior right hemicolectomy    CORONARY ARTERY BYPASS GRAFT      DENTAL SURGERY      JOINT REPLACEMENT  January 2019    left knee    KNEE SURGERY      LYMPH NODE BIOPSY  2003    LYMPHADENECTOMY      OTHER SURGICAL HISTORY      MAZE PROCEDURE    IA ARTHRP KNE CONDYLE&PLATU MEDIAL&LAT COMPARTMENTS Left 01/28/2019    Procedure: ARTHROPLASTY LEFT KNEE TOTAL;  Surgeon: Reba Rae MD;  Location:  MAIN OR;  Service: Orthopedics    IA LARYNGOSCOPY DIRECT OPERATIVE W/BIOPSY Right 04/27/2022    Procedure: DIRECT LARYNGOSCOPY WITH BIOPSY RIGHT PHARYNX, POSSIBLE RIGHT NECK LYMPH NODE BIOPSY; FROZEN SECTION;  Surgeon: Davian Warren MD;  Location:  MAIN OR;  Service: ENT    SKIN BIOPSY      UPPER GASTROINTESTINAL ENDOSCOPY  11/2019    Schatzki ring    US GUIDED LYMPH NODE BIOPSY RIGHT  12/14/2021     Social History   Social History     Substance and Sexual Activity   Alcohol Use Yes Alcohol/week: 2.0 standard drinks of alcohol    Types: 2 Cans of beer per week    Comment: very rare "beer here and there"     Social History     Substance and Sexual Activity   Drug Use No     Social History     Tobacco Use   Smoking Status Former    Packs/day: 1.00    Years: 42.00    Total pack years: 42.00    Types: Cigarettes    Start date: 5    Quit date: 2009    Years since quittin.8   Smokeless Tobacco Never     E-Cigarette/Vaping    E-Cigarette Use Never User      E-Cigarette/Vaping Substances    Nicotine No     THC No     CBD No     Flavoring No     Other No     Unknown No      Family History:   Family History   Problem Relation Age of Onset    Heart block Family     Coronary artery disease Mother     Thrombosis Mother     Hypertension Mother     Arthritis Mother     Hyperlipidemia Mother     Diabetes Father     Hypertension Father     Arthritis Father     Sudden death Father         cardiac    Diabetes type II Father     Colon cancer Paternal Grandfather     Cancer Paternal Grandfather     Breast cancer Sister     Heart disease Brother         Coronary stents       Meds/Allergies   all current active meds have been reviewed, pertinent pulmonary meds have been reviewed, current meds:   Current Facility-Administered Medications   Medication Dose Route Frequency    acetaminophen (TYLENOL) tablet 650 mg  650 mg Oral Q6H PRN    aluminum-magnesium hydroxide-simethicone (MAALOX) oral suspension 30 mL  30 mL Oral Q6H PRN    aspirin (ECOTRIN LOW STRENGTH) EC tablet 81 mg  81 mg Oral Daily    atorvastatin (LIPITOR) tablet 40 mg  40 mg Oral Daily    benzonatate (TESSALON PERLES) capsule 100 mg  100 mg Oral TID PRN    cefepime (MAXIPIME) IVPB (premix in dextrose) 2,000 mg 50 mL  2,000 mg Intravenous Q8H    guaiFENesin (MUCINEX) 12 hr tablet 600 mg  600 mg Oral Q12H [facility]    heparin (porcine) subcutaneous injection 5,000 Units  5,000 Units Subcutaneous Q8H [facility]    insulin lispro (HumaLOG) 100 units/mL subcutaneous injection 1-5 Units  1-5 Units Subcutaneous TID AC    insulin lispro (HumaLOG) 100 units/mL subcutaneous injection 1-5 Units  1-5 Units Subcutaneous HS    metoprolol succinate (TOPROL-XL) 24 hr tablet 100 mg  100 mg Oral Daily    montelukast (SINGULAIR) tablet 10 mg  10 mg Oral Daily    pantoprazole (PROTONIX) EC tablet 40 mg  40 mg Oral Early Morning    polyethylene glycol (MIRALAX) packet 17 g  17 g Oral Daily PRN    potassium chloride (K-DUR,KLOR-CON) CR tablet 40 mEq  40 mEq Oral BID    trimethobenzamide (TIGAN) IM injection 200 mg  200 mg Intramuscular Q12H PRN    valACYclovir (VALTREX) tablet 500 mg  500 mg Oral Daily    vancomycin (VANCOCIN) 1,250 mg in sodium chloride 0.9 % 250 mL IVPB  1,250 mg Intravenous Q24H   , and PTA meds:   Prior to Admission Medications   Prescriptions Last Dose Informant Patient Reported? Taking?    IMMUNE GLOBULIN, HUMAN, IV Past Month Self Yes Yes   Sig: Inject 30,000 mg into a catheter in a vein   Jardiance 25 MG TABS 11/18/2023  No Yes   Sig: TAKE 1 TABLET BY MOUTH EVERY DAY IN THE MORNING   aspirin 81 MG tablet 11/18/2023 Self Yes Yes   Sig: Take 1 tablet (81 mg total) by mouth daily   atorvastatin (LIPITOR) 40 mg tablet 11/18/2023  No Yes   Sig: TAKE 1 TABLET BY MOUTH EVERY DAY   cholecalciferol (VITAMIN D3) 1,000 units tablet 11/18/2023 Self No Yes   Sig: Take 2 tablets (2,000 Units total) by mouth daily   dexamethasone (DECADRON) 4 mg tablet Past Month  Yes Yes   ferrous sulfate 324 (65 Fe) mg 11/18/2023  No Yes   Sig: TAKE 1 TABLET BY MOUTH EVERY DAY WITH BREAKFAST   furosemide (LASIX) 20 mg tablet 11/18/2023  No Yes   Sig: TAKE 1 TABLET BY MOUTH TWICE A DAY   metFORMIN (GLUCOPHAGE-XR) 500 mg 24 hr tablet 11/18/2023  No Yes   Sig: TAKE 3 TABLETS (1,500 MG TOTAL) BY MOUTH DAILY WITH BREAKFAST   metoprolol succinate (TOPROL-XL) 100 mg 24 hr tablet 11/18/2023  No Yes   Sig: TAKE 1 TABLET BY MOUTH EVERY DAY   montelukast (SINGULAIR) 10 mg tablet 11/18/2023  Yes Yes Sig: Take 10 mg by mouth daily   multivitamin-minerals (CENTRUM ADULTS) tablet 11/18/2023 Self No Yes   Sig: Take 1 tablet by mouth daily   nitroglycerin (NITROSTAT) 0.3 mg SL tablet Not Taking Self Yes No   Sig: Nitrostat 0.3 mg sublingual tablet   Place 1 tablet as needed by sublingual route as needed for 90 days. Patient not taking: Reported on 11/18/2023   omeprazole (PriLOSEC) 40 MG capsule 11/18/2023  No Yes   Sig: TAKE 1 CAPSULE (40 MG TOTAL) BY MOUTH DAILY. valACYclovir (VALTREX) 500 mg tablet 11/18/2023 Self Yes Yes   Sig: Take 500 mg by mouth daily      Facility-Administered Medications: None       Allergies   Allergen Reactions    Ceftin [Cefuroxime] Itching     However, has tolerated Cefazolin and Pip-Tazo since, which have different side chains. Be cautious with / avoid 2nd, 3rd, or 4th Gen Cephs that have similar side chains to Cefuroxime. Lantus [Insulin Glargine] Itching       Objective   Vitals: Blood pressure 151/70, pulse 74, temperature 97.5 °F (36.4 °C), resp. rate 18, height 5' 10" (1.778 m), weight 70.7 kg (155 lb 13.8 oz), SpO2 97 %. RA,Body mass index is 22.36 kg/m². Intake/Output Summary (Last 24 hours) at 11/19/2023 1031  Last data filed at 11/18/2023 1749  Gross per 24 hour   Intake 240 ml   Output --   Net 240 ml     Invasive Devices       Peripheral Intravenous Line  Duration             Peripheral IV 11/18/23 Left Antecubital <1 day                    Physical Exam  Vitals reviewed. Constitutional:       General: He is not in acute distress. Appearance: He is well-developed. He is not toxic-appearing or diaphoretic. HENT:      Head: Normocephalic and atraumatic. Eyes:      General: No scleral icterus. Neck:      Trachea: No tracheal deviation. Cardiovascular:      Rate and Rhythm: Normal rate and regular rhythm. Heart sounds: S1 normal and S2 normal. No murmur heard. No friction rub. No gallop.    Pulmonary:      Effort: Pulmonary effort is normal. No tachypnea, accessory muscle usage or respiratory distress. Breath sounds: Normal breath sounds. No stridor. No decreased breath sounds, wheezing, rhonchi or rales. Chest:      Chest wall: No tenderness. Abdominal:      General: Bowel sounds are normal. There is no distension. Palpations: Abdomen is soft. Tenderness: There is no abdominal tenderness. Musculoskeletal:         General: No tenderness. Cervical back: Neck supple. Right lower leg: No edema. Left lower leg: No edema. Skin:     General: Skin is warm and dry. Findings: No rash. Neurological:      Mental Status: He is alert and oriented to person, place, and time. GCS: GCS eye subscore is 4. GCS verbal subscore is 5. GCS motor subscore is 6. Psychiatric:         Speech: Speech normal.         Behavior: Behavior normal. Behavior is cooperative. Lab Results: CBC:   Lab Results   Component Value Date    WBC 8.28 11/19/2023    HGB 12.4 11/19/2023    HCT 38.7 11/19/2023    MCV 87 11/19/2023     (L) 11/19/2023    RBC 4.44 11/19/2023    MCH 27.9 11/19/2023    MCHC 32.0 11/19/2023    RDW 15.5 (H) 11/19/2023    MPV 9.8 11/19/2023    NRBC 0 11/18/2023   , CMP:   Lab Results   Component Value Date    SODIUM 137 11/19/2023    K 3.5 11/19/2023     11/19/2023    CO2 26 11/19/2023    BUN 14 11/19/2023    CREATININE 0.82 11/19/2023    CALCIUM 8.6 11/19/2023    EGFR 85 11/19/2023     Procalcitonin: 0.22, 0.32    Flu/COVID/RSV PCR: RP2 negative    Culture Data: MRSA culture and Blood cultures x 2 pending; Strep A negative    Urinary antigens: Strep and Legionella negative    Imaging Studies: I have personally reviewed pertinent films in PACS   CXR shows multifocal opacites. Awaiting final radiology interpretation. Code Status: Level 1 - Full Code    Portions of the record may have been created with voice recognition software.   Occasional wrong word or "sound a like" substitutions may have occurred due to the inherent limitations of voice recognition software. Read the chart carefully and recognize, using context, where substitutions have occurred.

## 2023-11-19 NOTE — PROGRESS NOTES
Iker Whyte is a 69 y.o. male who is currently ordered Vancomycin IV with management by the Pharmacy Consult service. Relevant clinical data and objective / subjective history reviewed. Vancomycin Assessment:  Indication and Goal AUC/Trough: Pneumonia (goal -600, trough >10)  Clinical Status: stable  Micro:     Renal Function:  SCr: 0.82 mg/dL  CrCl: 76.6 mL/min  Renal replacement: Not on dialysis  Days of Therapy: 2  Current Dose: 1250mg every 24 hours  Vancomycin Plan:  New Dosing: No chnage  Estimated AUC: 476 mcg*hr/mL  Estimated Trough: 15.5 mcg/mL  Next Level: 11/20/23 at 0600  Renal Function Monitoring: Daily BMP and [place] will continue to follow closely for s/sx of nephrotoxicity, infusion reactions and appropriateness of therapy. BMP and CBC will be ordered per protocol. We will continue to follow the patient’s culture results and clinical progress daily. Also, cefepime will be adjusted renally if necessary.     Ngoc Rae, Pharmacist, PharmD, BCPS

## 2023-11-19 NOTE — UTILIZATION REVIEW
Initial Clinical Review    Admission: Date/Time/Statement:   Admission Orders (From admission, onward)       Ordered        11/18/23 1614  INPATIENT ADMISSION  Once                          Orders Placed This Encounter   Procedures    INPATIENT ADMISSION     Standing Status:   Standing     Number of Occurrences:   1     Order Specific Question:   Level of Care     Answer:   Med Surg [16]     Order Specific Question:   Estimated length of stay     Answer:   More than 2 Midnights     Order Specific Question:   Certification     Answer:   I certify that inpatient services are medically necessary for this patient for a duration of greater than two midnights. See H&P and MD Progress Notes for additional information about the patient's course of treatment. ED Arrival Information       Expected   -    Arrival   11/18/2023 13:42    Acuity   Urgent              Means of arrival   Walk-In    Escorted by   Self    Service   Hospitalist    Admission type   Emergency              Arrival complaint   sent from urgent care - pneumonia             Chief Complaint   Patient presents with    Fever     Pt sent over from . Pt reports waking up this morning with a fever and sore throat. Slight cough. Initial Presentation: 69 y.o. male  PMH of MM, HTN, HLD, DM, CHF, Afib who presents with a cough, sore throat and fever. Presented to the urgent care chest x-ray was done as well as COVID test and was shown to have multifocal pneumonia. Pt tachypnea and leukocytosis  13.4 procal elevated 0.32. received IVF, IV levaquin   Admitted inpatient Sepsis 2/2 multifocal pneumonia  Started IV Cefepime Vanco consult pulmonary and ID d/t immunocompromised state  Chronic diastolic CHF hold lasix for now due to sepsis  Multiple myeloma not having achieved remission. Follow [facility] oncology continue dexamethasone, hold daratumumab Velcade.  Defer stress dose steroids at this time  Date: 11/19/23   Day 2:   Pulmonary Consult  Fever at home increased cough mild hemoptysis concern for community acquired pneumonia/tracheobronchitis . Cxr showed patch infiltrates may be atypical pneumonia. Defer to ID abx.    Pt hx esophageal stricture past possible aspiration consider speech eval. On palliative chemo and also on IVIG monthly for recurrent infection f/u with own oncologist. GianfrancoPattonleann             ED Triage Vitals   Temperature Pulse Respirations Blood Pressure SpO2   11/18/23 1413 11/18/23 1412 11/18/23 1412 11/18/23 1412 11/18/23 1412   98.4 °F (36.9 °C) 77 (!) 24 110/53 96 %      Temp Source Heart Rate Source Patient Position - Orthostatic VS BP Location FiO2 (%)   11/18/23 1412 11/18/23 1412 -- -- --   Oral Monitor         Pain Score       11/18/23 1948       No Pain          Wt Readings from Last 1 Encounters:   11/19/23 70.7 kg (155 lb 13.8 oz)     Additional Vital Signs:   1/19/23 07:57:56 97.5 °F (36.4 °C) 74 -- 151/70 97 97 % --   11/18/23 2224 -- 81 18 -- -- 99 % --   11/18/23 21:08:22 97.5 °F (36.4 °C) 72 -- 142/65 91 96 % --   11/18/23 1948 98.4 °F (36.9 °C) -- 18 118/55 -- -- None (Room air)   11/18/23 17:06:21 -- 64 -- 118/55 76 97 %      Pertinent Labs/Diagnostic Test Results:   XR chest portable    (Results Pending)     Results from last 7 days   Lab Units 11/18/23  1725   SARS-COV-2  Not Detected     Results from last 7 days   Lab Units 11/19/23  0415 11/18/23  1419   WBC Thousand/uL 8.28 13.41*   HEMOGLOBIN g/dL 12.4 13.3   HEMATOCRIT % 38.7 41.6   PLATELETS Thousands/uL 139* 153   NEUTROS ABS Thousands/µL  --  10.07*         Results from last 7 days   Lab Units 11/19/23  0415 11/18/23  1419   SODIUM mmol/L 137 136   POTASSIUM mmol/L 3.5 3.6   CHLORIDE mmol/L 104 100   CO2 mmol/L 26 28   ANION GAP mmol/L 7 8   BUN mg/dL 14 17   CREATININE mg/dL 0.82 0.98   EGFR ml/min/1.73sq m 85 74   CALCIUM mg/dL 8.6 9.3   MAGNESIUM mg/dL 1.9  --          Results from last 7 days   Lab Units 11/19/23  1117 11/19/23  0756 11/18/23  2105 11/18/23  1721   POC GLUCOSE mg/dl 203* 111 93 129     Results from last 7 days   Lab Units 11/19/23  0415 11/18/23  1419   GLUCOSE RANDOM mg/dL 98 104     Results from last 7 days   Lab Units 11/19/23  0415 11/18/23  1419   PROCALCITONIN ng/ml 0.22 0.32*     Results from last 7 days   Lab Units 11/18/23  1614   LACTIC ACID mmol/L 0.9       Results from last 7 days   Lab Units 11/18/23  2113 11/18/23  1725   STREP PNEUMONIAE ANTIGEN, URINE  Negative  --    LEGIONELLA URINARY ANTIGEN  Negative  --    RESPIRATORY SYNCYTIAL VIRUS   --  Not Detected     Results from last 7 days   Lab Units 11/18/23  1725   ADENOVIRUS  Not Detected   BORDETELLA PARAPERTUSSIS  Not Detected   BORDETELLA PERTUSSIS  Not Detected   CHLAMYDIA PNEUMONIAE  Not Detected   CORONAVIRUS 229E  Not Detected   CORONAVIRUS HKU1  Not Detected   CORONAVIRUS NL63  Not Detected   CORONAVIRUS OC43  Not Detected   METAPNEUMOVIRUS  Not Detected   RHINOVIRUS  Not Detected   MYCOPLASMA PNEUMONIAE  Not Detected   PARAINFLUENZA 1  Not Detected   PARAINFLUENZA 2  Not Detected   PARAINFLUENZA 3  Not Detected   PARAINFLUENZA 4  Not Detected       Results from last 7 days   Lab Units 11/18/23  1614   BLOOD CULTURE  Received in Microbiology Lab. Culture in Progress. Received in Microbiology Lab. Culture in Progress.          ED Treatment:   Medication Administration from 11/18/2023 1342 to 11/18/2023 1649         Date/Time Order Dose Route Action Action by Comments     11/18/2023 1615 EST levofloxacin (LEVAQUIN) IVPB (premix in dextrose) 750 mg 150 mL 750 mg Intravenous New Bag Jamal Pop RN --     11/18/2023 1616 EST sodium chloride 0.9 % bolus 1,000 mL 1,000 mL Intravenous New Bag Jamal Pop RN --          Past Medical History:   Diagnosis Date    Acute respiratory failure with hypoxia ([address]) 04/20/2022    Arthritis     Cancer (HCC)     Cataract     Colitis     Colon polyp     Fatty liver     History of chemotherapy     History of radiation therapy     History of shingles 2018    History of transfusion     Hyperlipidemia     Hypertension      Present on Admission:   Type 2 diabetes mellitus without complication, without long-term current use of insulin (HCC)   Dyslipidemia   Multiple myeloma not having achieved remission (HCC)   Essential hypertension   Chronic diastolic congestive heart failure (HCC)   Paroxysmal atrial fibrillation (HCC)   Sepsis without acute organ dysfunction (HCC)   Multifocal pneumonia      Admitting Diagnosis: Pneumonia [J18.9]  Fever [R50.9]  Multiple myeloma not having achieved remission ([address]) [C90.00]  Sepsis ([address]) [A41.9]  Multifocal pneumonia [J18.9]  Sepsis without acute organ dysfunction, due to unspecified organism ([address]) [A41.9]  Age/Sex: 69 y.o. male  Admission Orders:  Med surg  Pt ot  Airway clearance   Vanco random  Bmp panel   Cbc plt  Mg   Procalcitonin  Sputum gs cx  Mrsa cx  Scheduled Medications:  aspirin, 81 mg, Oral, Daily  atorvastatin, 40 mg, Oral, Daily  cefepime, 2,000 mg, Intravenous, Q8H  guaiFENesin, 600 mg, Oral, Q12H DONNIE  heparin (porcine), 5,000 Units, Subcutaneous, Q8H [address]  insulin lispro, 1-5 Units, Subcutaneous, TID AC  insulin lispro, 1-5 Units, Subcutaneous, HS  metoprolol succinate, 100 mg, Oral, Daily  montelukast, 10 mg, Oral, Daily  pantoprazole, 40 mg, Oral, Early Morning  potassium chloride, 40 mEq, Oral, BID  valACYclovir, 500 mg, Oral, Daily  vancomycin, 1,250 mg, Intravenous, Q24H      Continuous IV Infusions:     PRN Meds:  acetaminophen, 650 mg, Oral, Q6H PRN  aluminum-magnesium hydroxide-simethicone, 30 mL, Oral, Q6H PRN  benzonatate, 100 mg, Oral, TID PRN  polyethylene glycol, 17 g, Oral, Daily PRN  trimethobenzamide, 200 mg, Intramuscular, Q12H PRN        IP CONSULT TO PHARMACY  IP CONSULT TO PULMONOLOGY  IP CONSULT TO INFECTIOUS DISEASES    Network Utilization Review Department  ATTENTION: Please call with any questions or concerns to [phone] and carefully listen to the prompts so that you are directed to the right person. All voicemails are confidential.   For Discharge needs, contact Care Management DC Support Team at [phone] opt. 2  Send all requests for admission clinical reviews, approved or denied determinations and any other requests to dedicated fax number below belonging to the campus where the patient is receiving treatment.  List of dedicated fax numbers for the Facilities:  [place] DENIALS (Administrative/Medical Necessity) [phone]   DISCHARGE SUPPORT TEAM (NETWORK) [id number] Miha Hurt (Maternity/NICU/Pediatrics) [phone]   [address] [place] [address] 1521 [facility] [address] [phone]   [address] [place] [address] 5220 [facility] [address] [address] [facility] [address] [facility] [address] [place] [phone]

## 2023-11-19 NOTE — PLAN OF CARE
Problem: PAIN - ADULT  Goal: Verbalizes/displays adequate comfort level or baseline comfort level  Description: Interventions:  - Encourage patient to monitor pain and request assistance  - Assess pain using appropriate pain scale  - Administer analgesics based on type and severity of pain and evaluate response  - Implement non-pharmacological measures as appropriate and evaluate response  - Consider cultural and social influences on pain and pain management  - Notify physician/advanced practitioner if interventions unsuccessful or patient reports new pain  Outcome: Progressing     Problem: INFECTION - ADULT  Goal: Absence or prevention of progression during hospitalization  Description: INTERVENTIONS:  - Assess and monitor for signs and symptoms of infection  - Monitor lab/diagnostic results  - Monitor all insertion sites, i.e. indwelling lines, tubes, and drains  - Monitor endotracheal if appropriate and nasal secretions for changes in amount and color  - [place] appropriate cooling/warming therapies per order  - Administer medications as ordered  - Instruct and encourage patient and family to use good hand hygiene technique  - Identify and instruct in appropriate isolation precautions for identified infection/condition  Outcome: Progressing  Goal: Absence of fever/infection during neutropenic period  Description: INTERVENTIONS:  - Monitor WBC    Outcome: Progressing     Problem: SAFETY ADULT  Goal: Patient will remain free of falls  Description: INTERVENTIONS:  - Educate patient/family on patient safety including physical limitations  - Instruct patient to call for assistance with activity   - Consult OT/PT to assist with strengthening/mobility   - Keep Call bell within reach  - Keep bed low and locked with side rails adjusted as appropriate  - Keep care items and personal belongings within reach  - Initiate and maintain comfort rounds  - Make Fall Risk Sign visible to staff  - Offer Toileting every 2 Hours, in advance of need  - Obtain necessary fall risk management equipment:  - Apply yellow socks and bracelet for high fall risk patients  - Consider moving patient to room near nurses station  Outcome: Progressing  Goal: Maintain or return to baseline ADL function  Description: INTERVENTIONS:  -  Assess patient's ability to carry out ADLs; assess patient's baseline for ADL function and identify physical deficits which impact ability to perform ADLs (bathing, care of mouth/teeth, toileting, grooming, dressing, etc.)  - Assess/evaluate cause of self-care deficits   - Assess range of motion  - Assess patient's mobility; develop plan if impaired  - Assess patient's need for assistive devices and provide as appropriate  - Encourage maximum independence but intervene and supervise when necessary  - Involve family in performance of ADLs  - Assess for home care needs following discharge   - Consider OT consult to assist with ADL evaluation and planning for discharge  - Provide patient education as appropriate  Outcome: Progressing  Goal: Maintains/Returns to pre admission functional level  Description: INTERVENTIONS:  - Perform AM-PAC 6 Click Basic Mobility/ Daily Activity assessment daily.  - Set and communicate daily mobility goal to care team and patient/family/caregiver. - Collaborate with rehabilitation services on mobility goals if consulted  - Perform Range of Motion 3 times a day. - Reposition patient every 3 hours.   - Dangle patient 3 times a day  - Stand patient 3 times a day  - Ambulate patient 3 times a day  - Out of bed to chair 3 times a day   - Out of bed for meals 3 times a day  - Out of bed for toileting  - Record patient progress and toleration of activity level   Outcome: Progressing     Problem: DISCHARGE PLANNING  Goal: Discharge to home or other facility with appropriate resources  Description: INTERVENTIONS:  - Identify barriers to discharge w/patient and caregiver  - Arrange for needed discharge resources and transportation as appropriate  - Identify discharge learning needs (meds, wound care, etc.)  - Arrange for interpretive services to assist at discharge as needed  - Refer to Case Management Department for coordinating discharge planning if the patient needs post-hospital services based on physician/advanced practitioner order or complex needs related to functional status, cognitive ability, or social support system  Outcome: Progressing     Problem: Knowledge Deficit  Goal: Patient/family/caregiver demonstrates understanding of disease process, treatment plan, medications, and discharge instructions  Description: Complete learning assessment and assess knowledge base.   Interventions:  - Provide teaching at level of understanding  - Provide teaching via preferred learning methods  Outcome: Progressing

## 2023-11-19 NOTE — CONSULTS
Consultation - [facility] Gastroenterology     Geovany Rae 69 y.o. male MRN: [id number]  Unit/Bed#: -01 Encounter: [id number]    Inpatient consult to gastroenterology  Consult performed by: Dave Cox PA-C  Consult ordered by: Philly Kirkland MD          ASSESSMENT and PLAN  1. Multifocal pneumonia with sepsis  2. Dysphagia  3. Irregular bowel habits  4. History of colon cancer  5. Multiple myeloma on chemotherapy and IVIG    Geovany Rae is a 69y.o. year old male with a past medical history of MM on chemo and IVIG 1x/month, HTN, HLD, DM, CHF, Afib not on [facility] who presents with a cough, sore throat and fever. Found to have multifocal pneumonia. Improved, pt on room air. GI asked to evaluate for worsening chronic dysphagia to solids and liquids with regurgitation and coughing. He tolerated regular diet today consuming whole meal without issues. Pt with h/o oropharyngeal and esophageal dysphagia and Schatzki's ring . Had resolution of his dysphagia after empiric dilation on endoscopy 2021. As he is currently tolerating a diet believe this repeat endoscopy can be performed as an outpatient.    -Diet as tolerated, continue PPI daily  -Consider speech evaluation  -Defer to primary team regarding management of pneumonia, currently on  Vanco and Cefepime  -Outpatient GI follow-up for endoscopy  -For his irregular bowel habits he could try daily fiber and follow-up as an outpatient given history of colon cancer    Chief Complaint   Patient presents with    Fever     Pt sent over from . Pt reports waking up this morning with a fever and sore throat. Slight cough. Physician Requesting Consult: Philly Kirkland MD    HPI    Geovany Rae is a 69y.o. year old male with a past medical history of MM on chemo and IVIG 1x/month, HTN, HLD, DM, CHF, Afib not on [facility] who presents with a cough, sore throat and fever. Found to have multifocal pneumonia. Patient came to the ED.  GI asked to evaluate for worsening chronic dysphagia. Patient notes for the last few months his dysphagia solids and liquids has returned. He feels like it gets stuck in his throat and he can cough and regurgitate. No odynophagia. Reflux controlled with daily PPI. He does take aspirin 81 mg daily but denies any other NSAID use. He has irregular stools with formed or loose stools daily without bleeding. He has a poor appetite and food does not taste well but weight stable. He tolerated regular diet today consuming whole meal without issues. Known in our office with history of esophageal dysphagia and Schatzki's ring as well as Roberts's esophagus. Last endoscopy January 2021 showing irregular Z-line biopsy unremarkable, esophagus empirically dilated to 64 [place] with resolution of his dysphagia. Also has a history of colon cancer, last colonoscopy January 2021 Showed healthy anastomosis, diverticulosis, there is some acute proctitis biopsies showed possible infectious or ischemic or medication induced, no signs IBD. Patient was treated with Rowasa enemas but no history of IBD. Eval by speech March 2022 found to have mild oropharyngeal dysphagia on video swallow recommend mechanical soft thin liquid diet. Esophageal component not assessed. ROS:   Constitutional: denies fatigue, fever. HEENT: denies visual disturbance, postnasal drip, sore throat. Respiratory: denies cough, + shortness of breath. Cardiovascular: denies chest pain, leg swelling. Gastrointestinal: as noted above in HPI. : denies difficulty urinating, dysuria. Musculoskeletal: denies arthralgias, back pain. Neurological: denies dizziness, syncope. Psychiatric: denies confusion, anxiety.     Historical Information   Past Medical History:   Diagnosis Date    Acute respiratory failure with hypoxia ([address]) 04/20/2022    Arthritis     Cancer (HCC)     Cataract     Colitis     Colon polyp     Fatty liver     History of chemotherapy History of radiation therapy     History of shingles 2018    History of transfusion     Hyperlipidemia     Hypertension      Past Surgical History:   Procedure Laterality Date    AORTIC VALVE REPLACEMENT      CARDIAC VALVE REPLACEMENT      aorta    CATARACT EXTRACTION Bilateral     COLECTOMY      COLONOSCOPY  2019    Prior right hemicolectomy    CORONARY ARTERY BYPASS GRAFT      DENTAL SURGERY      JOINT REPLACEMENT  2019    left knee    KNEE SURGERY      LYMPH NODE BIOPSY  2003    LYMPHADENECTOMY      OTHER SURGICAL HISTORY      MAZE PROCEDURE    DC ARTHRP KNE CONDYLE&PLATU MEDIAL&LAT COMPARTMENTS Left 2019    Procedure: ARTHROPLASTY LEFT KNEE TOTAL;  Surgeon: Jesús Hoang MD;  Location:  MAIN OR;  Service: Orthopedics    DC LARYNGOSCOPY DIRECT OPERATIVE W/BIOPSY Right 2022    Procedure: DIRECT LARYNGOSCOPY WITH BIOPSY RIGHT PHARYNX, POSSIBLE RIGHT NECK LYMPH NODE BIOPSY; FROZEN SECTION;  Surgeon: Judge Rolf MD;  Location:  MAIN OR;  Service: ENT    SKIN BIOPSY      UPPER GASTROINTESTINAL ENDOSCOPY  2019    Schatzki ring    US GUIDED LYMPH NODE BIOPSY RIGHT  2021     Social History   Social History     Substance and Sexual Activity   Alcohol Use Yes    Alcohol/week: 2.0 standard drinks of alcohol    Types: 2 Cans of beer per week    Comment: very rare "beer here and there"     Social History     Substance and Sexual Activity   Drug Use No     Social History     Tobacco Use   Smoking Status Former    Packs/day: 1.00    Years: 42.00    Total pack years: 42.00    Types: Cigarettes    Start date: 5    Quit date: 2009    Years since quittin.8   Smokeless Tobacco Never     Family History   Problem Relation Age of Onset    Heart block Family     Coronary artery disease Mother     Thrombosis Mother     Hypertension Mother     Arthritis Mother     Hyperlipidemia Mother     Diabetes Father     Hypertension Father     Arthritis Father     Sudden death Father cardiac    Diabetes type II Father     Colon cancer Paternal Grandfather     Cancer Paternal Grandfather     Breast cancer Sister     Heart disease Brother         Coronary stents       Meds/Allergies     Current Facility-Administered Medications   Medication Dose Route Frequency    acetaminophen (TYLENOL) tablet 650 mg  650 mg Oral Q6H PRN    aluminum-magnesium hydroxide-simethicone (MAALOX) oral suspension 30 mL  30 mL Oral Q6H PRN    aspirin (ECOTRIN LOW STRENGTH) EC tablet 81 mg  81 mg Oral Daily    atorvastatin (LIPITOR) tablet 40 mg  40 mg Oral Daily    benzonatate (TESSALON PERLES) capsule 100 mg  100 mg Oral TID PRN    cefepime (MAXIPIME) IVPB (premix in dextrose) 2,000 mg 50 mL  2,000 mg Intravenous Q8H    guaiFENesin (MUCINEX) 12 hr tablet 600 mg  600 mg Oral Q12H DONNIE    heparin (porcine) subcutaneous injection 5,000 Units  5,000 Units Subcutaneous Q8H [facility]    insulin lispro (HumaLOG) 100 units/mL subcutaneous injection 1-5 Units  1-5 Units Subcutaneous TID AC    insulin lispro (HumaLOG) 100 units/mL subcutaneous injection 1-5 Units  1-5 Units Subcutaneous HS    metoprolol succinate (TOPROL-XL) 24 hr tablet 100 mg  100 mg Oral Daily    montelukast (SINGULAIR) tablet 10 mg  10 mg Oral Daily    pantoprazole (PROTONIX) EC tablet 40 mg  40 mg Oral Early Morning    polyethylene glycol (MIRALAX) packet 17 g  17 g Oral Daily PRN    potassium chloride (K-DUR,KLOR-CON) CR tablet 40 mEq  40 mEq Oral BID    trimethobenzamide (TIGAN) IM injection 200 mg  200 mg Intramuscular Q12H PRN    valACYclovir (VALTREX) tablet 500 mg  500 mg Oral Daily    vancomycin (VANCOCIN) 1,250 mg in sodium chloride 0.9 % 250 mL IVPB  1,250 mg Intravenous Q24H     Medications Prior to Admission   Medication    aspirin 81 MG tablet    atorvastatin (LIPITOR) 40 mg tablet    cholecalciferol (VITAMIN D3) 1,000 units tablet    dexamethasone (DECADRON) 4 mg tablet    ferrous sulfate 324 (65 Fe) mg    furosemide (LASIX) 20 mg tablet    IMMUNE GLOBULIN, HUMAN, IV    Jardiance 25 MG TABS    metFORMIN (GLUCOPHAGE-XR) 500 mg 24 hr tablet    metoprolol succinate (TOPROL-XL) 100 mg 24 hr tablet    montelukast (SINGULAIR) 10 mg tablet    multivitamin-minerals (CENTRUM ADULTS) tablet    omeprazole (PriLOSEC) 40 MG capsule    valACYclovir (VALTREX) 500 mg tablet    nitroglycerin (NITROSTAT) 0.3 mg SL tablet       Allergies   Allergen Reactions    Ceftin [Cefuroxime] Itching     However, has tolerated Cefazolin and Pip-Tazo since, which have different side chains. Be cautious with / avoid 2nd, 3rd, or 4th Gen Cephs that have similar side chains to Cefuroxime. Lantus [Insulin Glargine] Itching       PHYSICAL EXAM:    Constitutional: Well-developed, no acute distress, thin, frail, chronically ill appearing  HEENT: normocephalic, mucous membranes moist.  Neck: Supple  Skin: warm and dry  Respiratory: rales B/L. Cardiovascular: Heart is regular rate and rhythm. Gastrointestinal: Soft, nontender, nondistended with normal active bowel sounds. No masses, guarding, rebound. Rectal Exam: Deferred. Extremities: No edema. Neurologic: Nonfocal. A & O ×3. Psychiatric: Normal affect.       Lab Results   Component Value Date    GLUCOSE 204 (H) 10/13/2017    CALCIUM 8.6 11/19/2023     05/19/2017    K 3.5 11/19/2023    CO2 26 11/19/2023     11/19/2023    BUN 14 11/19/2023    CREATININE 0.82 11/19/2023    CREATININE 0.98 11/18/2023    CREATININE 1.02 08/16/2023     Lab Results   Component Value Date    WBC 8.28 11/19/2023    WBC 13.41 (H) 11/18/2023    WBC 8.70 03/29/2023    HGB 12.4 11/19/2023    HGB 13.3 11/18/2023    HGB 10.9 (L) 03/29/2023    MCV 87 11/19/2023     (L) 11/19/2023     11/18/2023     03/29/2023     Lab Results   Component Value Date    ALT 14 08/16/2023    ALT 13 05/01/2023    ALT 7 03/24/2023    AST 19 08/16/2023    AST 17 05/01/2023    AST 12 (L) 03/24/2023    ALKPHOS 70 03/24/2023    ALKPHOS 88 03/23/2023    ALKPHOS 90 12/08/2022    TBILI 0.6 08/16/2023    TBILI 0.4 05/01/2023    TBILI 0.55 03/24/2023     No results found for: "AMYLASE"  Lab Results   Component Value Date    LIPASE 131 04/16/2021     Lab Results   Component Value Date    IRON 18 (L) 04/21/2022    TIBC 207 (L) 04/21/2022    FERRITIN 54 09/02/2021     Lab Results   Component Value Date    INR 0.94 03/27/2023    INR 0.98 03/23/2023    INR 1.14 12/07/2022       No results found. Imaging Studies: I have personally reviewed pertinent reports. Pathology, and Other Studies: I have personally reviewed pertinent reports. Patient expressed understanding and had all questions and concerns addressed. Malva Klinefelter, PA-C  11/19/23   1:52 PM     Counseling / Coordination of Care  Total floor / unit time spent today 45 minutes. This chart was completed in part utilizing [employer] speech voice recognition software. Random word insertions, pronoun errors, and incomplete sentences are an occasional consequence of this system due to software limitations, and ambient noise. Any questions or concerns about the content, text, or information contained within the body of this dictation should be directly addressed to the provider for clarification.

## 2023-11-19 NOTE — PROGRESS NOTES
Letty Banks is a 69 y.o. male who is currently ordered Vancomycin IV with management by the Pharmacy Consult service. Relevant clinical data and objective / subjective history reviewed. Vancomycin Assessment:  Indication and Goal AUC/Trough: Pneumonia (goal -600, trough >10)  Clinical Status:  initial dosing  Micro:     Renal Function:  SCr: 0.98 mg/dL  CrCl: 64.9 mL/min  Renal replacement: Not on dialysis  Days of Therapy: 1  Current Dose: 1750 mg once   Vancomycin Plan:  New Dosin mg Q 24 H   Estimated AUC: 456 mcg*hr/mL  Estimated Trough: 13 mcg/mL  Next Level: 23 @ 0600  Renal Function Monitoring: Daily BMP and [place] will continue to follow closely for s/sx of nephrotoxicity, infusion reactions and appropriateness of therapy. BMP and CBC will be ordered per protocol. We will continue to follow the patient’s culture results and clinical progress daily.     Mima Lo, Pharmacist

## 2023-11-19 NOTE — PROGRESS NOTES
[address]  Progress Note  Name: Iker Whyte I  MRN: [id number]  Unit/Bed#: -01 I Date of Admission: 11/18/2023   Date of Service: 11/19/2023 I Hospital Day: 1    Assessment/Plan   * Sepsis without acute organ dysfunction [facility])  Assessment & Plan  Pt presenting from the urgent care d/t fever sore throat and cough  CXR from urgent care showing multifocal PNA Covid test done at [facility] negative    Pt meeting SIRS with tachypnea and leukocytosis  Source: PNA  LA WNL  Procal elevated-> downward trending  Given levaquin and 1L bolus  PCR Strept A negative     BC x2 pending  CXR final read pending  RPP negative  Urine Ag negative  Mrsa cx pending  Sputum cx pending  IS ACP mucinex tessalon  Vanc and cefepime day 1  Consult pulm and ID d/t immunocompromised state     Multifocal pneumonia  Assessment & Plan  See management above    Paroxysmal atrial fibrillation ([address])  Assessment & Plan  RC metoprolol-> continue  AC not on any     Chronic diastolic congestive heart failure (HCC)  Assessment & Plan  Wt Readings from Last 3 Encounters:   08/23/23 71.5 kg (157 lb 9.6 oz)   05/30/23 70.8 kg (156 lb)   05/18/23 72.1 kg (159 lb)     Patient euvolemic on presentation  Echo done on 5/2/2022 showing an LVEF of 97% systolic function low normal grade 2 diastolic dysfunction aortic valve bioprosthetic valve  I's and O's  Daily weights  Diabetic low-salt diet  We will hold Lasix for now due to sepsis.   May resume on 11/20/2023         Essential hypertension  Assessment & Plan  Continue metoprolol  We will hold Lasix due to sepsis potentially resume tomorrow 11/20/2023    Multiple myeloma not having achieved remission [facility])  Assessment & Plan  Follows with [place] oncology  Hold daratumumab Velcade  Receives dexamethasone with infusion -> hold      Dyslipidemia  Assessment & Plan  Continue statin    Type 2 diabetes mellitus without complication, without long-term current use of insulin [facility])  Assessment & Plan  Lab Results   Component Value Date    HGBA1C 5.9 (H) 08/16/2023       Recent Labs     11/18/23  1721   POCGLU 129       Blood Sugar Average: Last 72 hrs:  (P) 129  A1c current  ISS  Hold oral meds  ADA diet  Hypoglycemic protocol                VTE Pharmacologic Prophylaxis: VTE Score: 7 High Risk (Score >/= 5) - Pharmacological DVT Prophylaxis Ordered: heparin. Sequential Compression Devices Ordered. Mobility:   Basic Mobility Inpatient Raw Score: 24  [facility] Goal: 8: Walk 250 feet or more  [facility]-HL Achieved: 1: Laying in bed  [facility] Goal NOT achieved. Continue with multidisciplinary rounding and encourage appropriate mobility to improve upon [facility] goals. Patient Centered Rounds: I performed bedside rounds with nursing staff today. Discussions with Specialists or Other Care Team Provider: ID, Pulm, CM    Education and Discussions with Family / Patient: Updated  (wife) via phone. Total Time Spent on Date of Encounter in care of patient: 40 mins. This time was spent on one or more of the following: performing physical exam; counseling and coordination of care; obtaining or reviewing history; documenting in the medical record; reviewing/ordering tests, medications or procedures; communicating with other healthcare professionals and discussing with patient's family/caregivers. Current Length of Stay: 1 day(s)  Current Patient Status: Inpatient   Certification Statement: The patient will continue to require additional inpatient hospital stay due to PNA  Discharge Plan: Anticipate discharge in 48-72 hrs to home. Code Status: Level 1 - Full Code    Subjective:   Christina Hopkins was seen and examined at bedside. No acute events overnight. Discussed plan of care. All questions and concerns were answered and addressed. Has no acute complaints at this time. We will continue Vanco and cefepime. Patient clinically chronically ill not acutely ill seems very stable.   Awaiting ID and pulmonology's recommendations. Awaiting culture results. Objective:     Vitals:   Temp (24hrs), Av °F (36.7 °C), Min:97.5 °F (36.4 °C), Max:98.4 °F (36.9 °C)    Temp:  [97.5 °F (36.4 °C)-98.4 °F (36.9 °C)] 97.5 °F (36.4 °C)  HR:  [64-81] 74  Resp:  [18-24] 18  BP: (110-151)/(53-70) 151/70  SpO2:  [96 %-99 %] 97 %  Body mass index is 22.36 kg/m². Input and Output Summary (last 24 hours): Intake/Output Summary (Last 24 hours) at 2023 1222  Last data filed at 2023 1749  Gross per 24 hour   Intake 240 ml   Output --   Net 240 ml       Physical Exam:   Physical Exam   Vitals and nursing note reviewed. Constitutional:       General: He is not in acute distress. Appearance: He is ill-appearing (chronically). Comments: Thin frail   HENT:      Head: Normocephalic and atraumatic. Cardiovascular:      Rate and Rhythm: Normal rate and regular rhythm. Pulses: Normal pulses. Heart sounds: Normal heart sounds. Pulmonary:      Effort: Pulmonary effort is normal.      Breath sounds: Rhonchi present. Abdominal:      General: Abdomen is flat. Bowel sounds are normal.      Palpations: Abdomen is soft. Musculoskeletal:      Right lower leg: No edema. Left lower leg: No edema. Skin:     General: Skin is warm. Neurological:      General: No focal deficit present. Mental Status: He is alert and oriented to person, place, and time.      Additional Data:     Labs:  Results from last 7 days   Lab Units 23  0415 23  1419   WBC Thousand/uL 8.28 13.41*   HEMOGLOBIN g/dL 12.4 13.3   HEMATOCRIT % 38.7 41.6   PLATELETS Thousands/uL 139* 153   NEUTROS PCT %  --  75   LYMPHS PCT %  --  15   MONOS PCT %  --  9   EOS PCT %  --  0     Results from last 7 days   Lab Units 23  0415   SODIUM mmol/L 137   POTASSIUM mmol/L 3.5   CHLORIDE mmol/L 104   CO2 mmol/L 26   BUN mg/dL 14   CREATININE mg/dL 0.82   ANION GAP mmol/L 7   CALCIUM mg/dL 8.6   GLUCOSE RANDOM mg/dL 98 Results from last 7 days   Lab Units 11/19/23  1117 11/19/23  0756 11/18/23  2105 11/18/23  1721   POC GLUCOSE mg/dl 203* 111 93 129         Results from last 7 days   Lab Units 11/19/23  0415 11/18/23  1614 11/18/23  1419   LACTIC ACID mmol/L  --  0.9  --    PROCALCITONIN ng/ml 0.22  --  0.32*       Lines/Drains:  Invasive Devices       Peripheral Intravenous Line  Duration             Peripheral IV 11/18/23 Left Antecubital <1 day                          Imaging: No pertinent imaging reviewed. Recent Cultures (last 7 days):   Results from last 7 days   Lab Units 11/18/23  2113 11/18/23  1614   BLOOD CULTURE   --  Received in Microbiology Lab. Culture in Progress. Received in Microbiology Lab. Culture in Progress.    [facility] URINARY ANTIGEN  Negative  --        Last 24 Hours Medication List:   Current Facility-Administered Medications   Medication Dose Route Frequency Provider Last Rate    acetaminophen  650 mg Oral Q6H PRN Tre Liu MD      aluminum-magnesium hydroxide-simethicone  30 mL Oral Q6H PRN Tre Liu MD      aspirin  81 mg Oral Daily Tre Liu MD      atorvastatin  40 mg Oral Daily Tre Liu MD      benzonatate  100 mg Oral TID PRN Tre Liu MD      cefepime  2,000 mg Intravenous Flaco Sharpe MD      guaiFENesin  600 mg Oral Q12H Munira Ferrer MD      heparin (porcine)  5,000 Units Subcutaneous Q8H [address] Tre Liu MD      insulin lispro  1-5 Units Subcutaneous TID AC Ter Liu MD      insulin lispro  1-5 Units Subcutaneous HS Tre Liu MD      metoprolol succinate  100 mg Oral Daily Tre Liu MD      montelukast  10 mg Oral Daily Tre Liu MD      pantoprazole  40 mg Oral Early Morning Tre Liu MD      polyethylene glycol  17 g Oral Daily PRN Tre Liu MD      potassium chloride  40 mEq Oral BID Tre Liu MD      trimethobenzamide  200 mg Intramuscular Q12H PRN Tre Liu MD      valACYclovir 500 mg Oral Daily Argentina Salinas MD      vancomycin  1,250 mg Intravenous Q24H Argentina Salinas MD          Today, Patient Was Seen By: Argentina Salinas MD    **Please Note: This note may have been constructed using a voice recognition system. **

## 2023-11-20 ENCOUNTER — TRANSITIONAL CARE MANAGEMENT (OUTPATIENT)
Dept: FAMILY MEDICINE CLINIC | Facility: HOSPITAL | Age: 78
End: 2023-11-20

## 2023-11-20 ENCOUNTER — TELEPHONE (OUTPATIENT)
Dept: GASTROENTEROLOGY | Facility: CLINIC | Age: 78
End: 2023-11-20

## 2023-11-20 VITALS
DIASTOLIC BLOOD PRESSURE: 80 MMHG | SYSTOLIC BLOOD PRESSURE: 152 MMHG | TEMPERATURE: 97.5 F | OXYGEN SATURATION: 96 % | BODY MASS INDEX: 21.75 KG/M2 | HEART RATE: 72 BPM | WEIGHT: 151.9 LBS | HEIGHT: 70 IN | RESPIRATION RATE: 20 BRPM

## 2023-11-20 LAB
ANION GAP SERPL CALCULATED.3IONS-SCNC: 7 MMOL/L
BUN SERPL-MCNC: 14 MG/DL (ref 5–25)
CALCIUM SERPL-MCNC: 9 MG/DL (ref 8.4–10.2)
CHLORIDE SERPL-SCNC: 103 MMOL/L (ref 96–108)
CO2 SERPL-SCNC: 27 MMOL/L (ref 21–32)
CREAT SERPL-MCNC: 0.85 MG/DL (ref 0.6–1.3)
ERYTHROCYTE [DISTWIDTH] IN BLOOD BY AUTOMATED COUNT: 15.6 % (ref 11.6–15.1)
GFR SERPL CREATININE-BSD FRML MDRD: 84 ML/MIN/1.73SQ M
GLUCOSE SERPL-MCNC: 107 MG/DL (ref 65–140)
GLUCOSE SERPL-MCNC: 125 MG/DL (ref 65–140)
GLUCOSE SERPL-MCNC: 134 MG/DL (ref 65–140)
HCT VFR BLD AUTO: 40.3 % (ref 36.5–49.3)
HGB BLD-MCNC: 12.9 G/DL (ref 12–17)
MAGNESIUM SERPL-MCNC: 2.3 MG/DL (ref 1.9–2.7)
MCH RBC QN AUTO: 27.9 PG (ref 26.8–34.3)
MCHC RBC AUTO-ENTMCNC: 32 G/DL (ref 31.4–37.4)
MCV RBC AUTO: 87 FL (ref 82–98)
MRSA NOSE QL CULT: NORMAL
PLATELET # BLD AUTO: 141 THOUSANDS/UL (ref 149–390)
PMV BLD AUTO: 10.3 FL (ref 8.9–12.7)
POTASSIUM SERPL-SCNC: 4.1 MMOL/L (ref 3.5–5.3)
PROCALCITONIN SERPL-MCNC: 0.16 NG/ML
RBC # BLD AUTO: 4.62 MILLION/UL (ref 3.88–5.62)
SODIUM SERPL-SCNC: 137 MMOL/L (ref 135–147)
VANCOMYCIN SERPL-MCNC: 10.5 UG/ML (ref 10–20)
WBC # BLD AUTO: 6.92 THOUSAND/UL (ref 4.31–10.16)

## 2023-11-20 PROCEDURE — 80048 BASIC METABOLIC PNL TOTAL CA: CPT | Performed by: STUDENT IN AN ORGANIZED HEALTH CARE EDUCATION/TRAINING PROGRAM

## 2023-11-20 PROCEDURE — 84145 PROCALCITONIN (PCT): CPT | Performed by: STUDENT IN AN ORGANIZED HEALTH CARE EDUCATION/TRAINING PROGRAM

## 2023-11-20 PROCEDURE — 80202 ASSAY OF VANCOMYCIN: CPT | Performed by: STUDENT IN AN ORGANIZED HEALTH CARE EDUCATION/TRAINING PROGRAM

## 2023-11-20 PROCEDURE — 99232 SBSQ HOSP IP/OBS MODERATE 35: CPT | Performed by: INTERNAL MEDICINE

## 2023-11-20 PROCEDURE — 85027 COMPLETE CBC AUTOMATED: CPT | Performed by: STUDENT IN AN ORGANIZED HEALTH CARE EDUCATION/TRAINING PROGRAM

## 2023-11-20 PROCEDURE — 82948 REAGENT STRIP/BLOOD GLUCOSE: CPT

## 2023-11-20 PROCEDURE — 83735 ASSAY OF MAGNESIUM: CPT | Performed by: STUDENT IN AN ORGANIZED HEALTH CARE EDUCATION/TRAINING PROGRAM

## 2023-11-20 PROCEDURE — 99239 HOSP IP/OBS DSCHRG MGMT >30: CPT | Performed by: INTERNAL MEDICINE

## 2023-11-20 RX ORDER — GUAIFENESIN 600 MG/1
600 TABLET, EXTENDED RELEASE ORAL EVERY 12 HOURS SCHEDULED
Qty: 10 TABLET | Refills: 0 | Status: SHIPPED | OUTPATIENT
Start: 2023-11-20 | End: 2023-11-25

## 2023-11-20 RX ORDER — CEFDINIR 300 MG/1
300 CAPSULE ORAL EVERY 12 HOURS SCHEDULED
Qty: 10 CAPSULE | Refills: 0 | Status: SHIPPED | OUTPATIENT
Start: 2023-11-20 | End: 2023-11-25

## 2023-11-20 RX ADMIN — VALACYCLOVIR HYDROCHLORIDE 500 MG: 500 TABLET, FILM COATED ORAL at 08:39

## 2023-11-20 RX ADMIN — ASPIRIN 81 MG: 81 TABLET, COATED ORAL at 08:39

## 2023-11-20 RX ADMIN — HEPARIN SODIUM 5000 UNITS: 5000 INJECTION INTRAVENOUS; SUBCUTANEOUS at 04:54

## 2023-11-20 RX ADMIN — METOPROLOL SUCCINATE 100 MG: 50 TABLET, EXTENDED RELEASE ORAL at 08:39

## 2023-11-20 RX ADMIN — VANCOMYCIN HYDROCHLORIDE 750 MG: 750 INJECTION, SOLUTION INTRAVENOUS at 06:00

## 2023-11-20 RX ADMIN — GUAIFENESIN 600 MG: 600 TABLET ORAL at 08:39

## 2023-11-20 RX ADMIN — ATORVASTATIN CALCIUM 40 MG: 40 TABLET, FILM COATED ORAL at 08:39

## 2023-11-20 RX ADMIN — CEFEPIME HYDROCHLORIDE 2000 MG: 2 INJECTION, SOLUTION INTRAVENOUS at 04:54

## 2023-11-20 RX ADMIN — MONTELUKAST 10 MG: 10 TABLET, FILM COATED ORAL at 08:39

## 2023-11-20 RX ADMIN — PANTOPRAZOLE SODIUM 40 MG: 40 TABLET, DELAYED RELEASE ORAL at 04:55

## 2023-11-20 NOTE — PROGRESS NOTES
Progress Note - Pulmonary   Cesia Leigh 69 y.o. male MRN: [id number]  Unit/Bed#: -01 Encounter: [id number]    Assessment & Plan:    Multifocal pneumonia  Immunocompromise state, secondary to receiving IVIG and maintenance chemotherapy  History of DAH  Dysphagia with esophageal stricture  Multiple myeloma      Patient is currently on room air saturating at 96%  Procalcitonin is trending down  Sputum culture needs collected  Follow-up with pending cultures  ID following, agree with deescalation of IV ABX to cefdinir  Continue with Mucinex, Singulair, benzonatate as needed for cough  Patient tolerating regular diet, follows with GI who plans to do outpatient endoscopy for dysphagia  He is on maintenance chemotherapy and IVIG for multiple myeloma. He follows outpatient with [facility] oncology. Recommend repeat chest x-ray to evaluate resolution of pneumonia in 4 to 6 weeks  Patient stable from pulmonary standpoint, no further recommendations    Chief Complaint:   "I feel good"    Subjective:   Patient states he is feeling good. He denies any recurrent hemoptysis since admission. He states his breathing feels normal and denies any cough, tightness of chest, wheezing or chest pain. He denies any fever. Objective:     Vitals: Blood pressure 152/80, pulse 72, temperature 97.5 °F (36.4 °C), resp. rate 20, height 5' 10" (1.778 m), weight 68.9 kg (151 lb 14.4 oz), SpO2 96 %. ,Body mass index is 21.79 kg/m².       Intake/Output Summary (Last 24 hours) at 11/20/2023 0847  Last data filed at 11/19/2023 1716  Gross per 24 hour   Intake 1140 ml   Output --   Net 1140 ml       Invasive Devices       Peripheral Intravenous Line  Duration             Peripheral IV 11/18/23 Left Antecubital 1 day                    Physical Exam:   General appearance:   Alert and awake, in no acute distress  Head:   Normocephalic, without obvious abnormality, atraumatic  Eyes:   No scleral icterus   Lungs:  Pulmonary effort nonlabored at rest  Breath sounds: Clear bilaterally. No wheezes. Cardiovascular:   Regular rate and rhythm. No murmurs. Capillary refill < 3 seconds. Abdomen:    No appreciable distension or tenderness  Extremities:   No deformity. No clubbing present. No edema to extremities. Skin:   Warm and dry. Intact. Color appropriate for ethnicity. Neurologic:   No acute focal deficits are noted. Psychiatric:   Normal mood. Answers questions appropriately. Labs: I have personally reviewed pertinent lab results. ,  CBC:   Lab Results   Component Value Date    WBC 6.92 11/20/2023    HGB 12.9 11/20/2023    HCT 40.3 11/20/2023    MCV 87 11/20/2023     (L) 11/20/2023    RBC 4.62 11/20/2023    MCH 27.9 11/20/2023    MCHC 32.0 11/20/2023    RDW 15.6 (H) 11/20/2023    MPV 10.3 11/20/2023   , CMP:   Lab Results   Component Value Date    SODIUM 137 11/20/2023    K 4.1 11/20/2023     11/20/2023    CO2 27 11/20/2023    BUN 14 11/20/2023    CREATININE 0.85 11/20/2023    CALCIUM 9.0 11/20/2023    EGFR 84 11/20/2023     Procalcitonin: 0.16    Imaging and other studies: I have personally reviewed pertinent reports. and I have personally reviewed pertinent films in PACS    XR chest portable 11/18/2023  Patchy airspace opacity in the left midlung field which may represent pneumonitis. Questionable additional bilateral parahilar infiltrates.        Cecilia Sultana MSN RN FNP-BC  Nurse Practitioner  [facility]

## 2023-11-20 NOTE — CONSULTS
Consultation - Infectious Disease   Dexter Otero 69 y.o. 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 Patient made rapid clinical improvement on vanco and cefepime. WBC is down, no fevers. Procal is normal.  Okay to d/c from ID standpoint on 5 days of cefdinir.    - d/c vanco   - d/c cefepime  - cefdinir 300 mg po bid for 5 days  - continue home valtrex     D/W primary team     No ID barriers to discharge      History of Present Illness   Physician Requesting Consult: Fang Coates MD  Reason for Consult / Principal Problem: pneumonia     HPI: Dexter Otero is a 69y.o. 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 Patient presented to ER with two days of fever and sore throat, some blood tinged sputum. Patient not hypoxic. WBC 13,000. Patient started on Rocephin for CAP. Since admission, rapid clinical improvement. WBC is down, no fevers. Procal from . 32 to normal.   CXR with subtle lobar infiltrates. Patient's urine legionella, strep pneumonia ag, blood cultures and respiratory viral panel are all negative. Inpatient consult to Infectious Diseases  Consult performed by: Belia Slater MD  Consult ordered by: Sandra Gross MD          ROS: 12 systems reviewed, remainder is neg.     Historical Information   Past Medical History:   Diagnosis Date    Acute respiratory failure with hypoxia ([address] ) 04/20/2022    Arthritis     Cancer (HCC)     Cataract     Colitis     Colon polyp     Fatty liver     History of chemotherapy     History of radiation therapy     History of shingles 2018    History of transfusion     Hyperlipidemia     Hypertension      Past Surgical History:   Procedure Laterality Date    AORTIC VALVE REPLACEMENT CARDIAC VALVE REPLACEMENT      aorta    CATARACT EXTRACTION Bilateral     COLECTOMY      COLONOSCOPY  2019    Prior right hemicolectomy    CORONARY ARTERY BYPASS GRAFT      DENTAL SURGERY      JOINT REPLACEMENT  2019    left knee    KNEE SURGERY      LYMPH NODE BIOPSY  2003    LYMPHADENECTOMY      OTHER SURGICAL HISTORY      MAZE PROCEDURE    PA ARTHRP KNE CONDYLE&PLATU MEDIAL&LAT COMPARTMENTS Left 2019    Procedure: ARTHROPLASTY LEFT KNEE TOTAL;  Surgeon: Luis A Stahl MD;  Location:  MAIN OR;  Service: Orthopedics    PA LARYNGOSCOPY DIRECT OPERATIVE W/BIOPSY Right 2022    Procedure: DIRECT LARYNGOSCOPY WITH BIOPSY RIGHT PHARYNX, POSSIBLE RIGHT NECK LYMPH NODE BIOPSY; FROZEN SECTION;  Surgeon: Terry Pop MD;  Location:  MAIN OR;  Service: ENT    SKIN BIOPSY      UPPER GASTROINTESTINAL ENDOSCOPY  2019    Schatzki ring    US GUIDED LYMPH NODE BIOPSY RIGHT  2021     Social History   Social History     Substance and Sexual Activity   Alcohol Use Yes    Alcohol/week: 2.0 standard drinks of alcohol    Types: 2 Cans of beer per week    Comment: very rare "beer here and there"     Social History     Substance and Sexual Activity   Drug Use No     Social History     Tobacco Use   Smoking Status Former    Packs/day: 1.00    Years: 42.00    Total pack years: 42.00    Types: Cigarettes    Start date: 5    Quit date: 2009    Years since quittin.8   Smokeless Tobacco Never     Family History   Problem Relation Age of Onset    Heart block Family     Coronary artery disease Mother     Thrombosis Mother     Hypertension Mother     Arthritis Mother     Hyperlipidemia Mother     Diabetes Father     Hypertension Father     Arthritis Father     Sudden death Father         cardiac    Diabetes type II Father     Colon cancer Paternal Grandfather     Cancer Paternal Grandfather     Breast cancer Sister     Heart disease Brother         Coronary stents       Meds/Allergies MEDS:reviewed       Current Facility-Administered Medications:     acetaminophen (TYLENOL) tablet 650 mg, 650 mg, Oral, Q6H PRN, Therese Jaffe MD    aluminum-magnesium hydroxide-simethicone (MAALOX) oral suspension 30 mL, 30 mL, Oral, Q6H PRN, Therese Jaffe MD    aspirin (ECOTRIN LOW STRENGTH) EC tablet 81 mg, 81 mg, Oral, Daily, Therese Jaffe MD, 81 mg at 11/20/23 0839    atorvastatin (LIPITOR) tablet 40 mg, 40 mg, Oral, Daily, Therese Jaffe MD, 40 mg at 11/20/23 0839    benzonatate (TESSALON PERLES) capsule 100 mg, 100 mg, Oral, TID PRN, Therese Jaffe MD    cefepime (MAXIPIME) IVPB (premix in dextrose) 2,000 mg 50 mL, 2,000 mg, Intravenous, Q8H, Therese Jaffe MD, Last Rate: 100 mL/hr at 11/20/23 0454, 2,000 mg at 11/20/23 0454    guaiFENesin (MUCINEX) 12 hr tablet 600 mg, 600 mg, Oral, Q12H [address], Therese Jaffe MD, 600 mg at 11/20/23 0839    heparin (porcine) subcutaneous injection 5,000 Units, 5,000 Units, Subcutaneous, Q8H [address], Therese Jaffe MD, 5,000 Units at 11/20/23 0454    insulin lispro (HumaLOG) 100 units/mL subcutaneous injection 1-5 Units, 1-5 Units, Subcutaneous, TID AC, 1 Units at 11/19/23 1650 **AND** Fingerstick Glucose (POCT), , , TID AC, Therese Jaffe MD    insulin lispro (HumaLOG) 100 units/mL subcutaneous injection 1-5 Units, 1-5 Units, Subcutaneous, HS, Therese Jaffe MD    metoprolol succinate (TOPROL-XL) 24 hr tablet 100 mg, 100 mg, Oral, Daily, Therese Jaffe MD, 100 mg at 11/20/23 0839    montelukast (SINGULAIR) tablet 10 mg, 10 mg, Oral, Daily, Therese Jaffe MD, 10 mg at 11/20/23 0839    pantoprazole (PROTONIX) EC tablet 40 mg, 40 mg, Oral, Early Morning, Therese Jaffe MD, 40 mg at 11/20/23 0455    polyethylene glycol (MIRALAX) packet 17 g, 17 g, Oral, Daily PRN, Therese Jaffe MD    trimethobenzamide (TIGAN) IM injection 200 mg, 200 mg, Intramuscular, Q12H PRN, Therese Jaffe MD    valACYclovir (VALTREX) tablet 500 mg, 500 mg, Oral, Daily, Therese Jaffe MD, 500 mg at 11/20/23 0839    vancomycin (VANCOCIN) IVPB (premix in dextrose) 750 mg 150 mL, 750 mg, Intravenous, Q12H, Yesy Mccarthy MD, Last Rate: 150 mL/hr at 11/20/23 0600, 750 mg at 11/20/23 0600    Allergies   Allergen Reactions    Ceftin [Cefuroxime] Itching     However, has tolerated Cefazolin and Pip-Tazo since, which have different side chains. Be cautious with / avoid 2nd, 3rd, or 4th Gen Cephs that have similar side chains to Cefuroxime. Lantus [Insulin Glargine] Itching         Intake/Output Summary (Last 24 hours) at 11/20/2023 2959  Last data filed at 11/19/2023 1716  Gross per 24 hour   Intake 1140 ml   Output --   Net 1140 ml       PE:  WD, WN, WF in NAD  VSS, Tmax: 97.5  HEENT: anicteric  NECK: supple, no adenopathy  CARDIAC: rrr s1s2  LUNGS: decreased  ABDOMEN: soft nt/nd + BS  EXTREMITIES: no edema  SKIN: no rash   NEURO: non-focal     Invasive Devices:   Peripheral IV 11/18/23 Left Antecubital (Active)   Site Assessment WDL; Clean;Dry; Intact 11/20/23 0700   Dressing Type Transparent 11/20/23 0700   Line Status Flushed; Infusing; Saline locked 11/20/23 0700   Dressing Status Clean;Dry; Intact 11/20/23 0700   Dressing Change Due 11/22/23 11/20/23 0700   Reason Not Rotated Not due 11/20/23 0700           Lab Results:   Admission on 11/18/2023   Component Date Value    WBC 11/18/2023 13.41 (H)     RBC 11/18/2023 4.72     Hemoglobin 11/18/2023 13.3     Hematocrit 11/18/2023 41.6     MCV 11/18/2023 88     MCH 11/18/2023 28.2     MCHC 11/18/2023 32.0     RDW 11/18/2023 15.4 (H)     MPV 11/18/2023 9.8     Platelets 85/65/4392 153     nRBC 11/18/2023 0     Neutrophils Relative 11/18/2023 75     Immat GRANS % 11/18/2023 0     Lymphocytes Relative 11/18/2023 15     Monocytes Relative 11/18/2023 9     Eosinophils Relative 11/18/2023 0     Basophils Relative 11/18/2023 1     Neutrophils Absolute 11/18/2023 10.07 (H)     Immature Grans Absolute 11/18/2023 0.06     Lymphocytes Absolute 11/18/2023 1.98 Monocytes Absolute 11/18/2023 1.16     Eosinophils Absolute 11/18/2023 0.06     Basophils Absolute 11/18/2023 0.08     Sodium 11/18/2023 136     Potassium 11/18/2023 3.6     Chloride 11/18/2023 100     CO2 11/18/2023 28     ANION GAP 11/18/2023 8     BUN 11/18/2023 17     Creatinine 11/18/2023 0.98     Glucose 11/18/2023 104     Calcium 11/18/2023 9.3     eGFR 11/18/2023 74     STREP A PCR 11/18/2023 Not Detected     Blood Culture 11/18/2023 No Growth at 24 hrs. Blood Culture 11/18/2023 No Growth at 24 hrs.      LACTIC ACID 11/18/2023 0.9     Procalcitonin 11/18/2023 0.32 (H)     Adenovirus 11/18/2023 Not Detected     Bordetella parapertussis 11/18/2023 Not Detected     Bordetella pertussis 11/18/2023 Not Detected     Chlamydia pneumoniae 11/18/2023 Not Detected     SARS-CoV-2 11/18/2023 Not Detected     Coronavirus 229E 11/18/2023 Not Detected     Coronavirus HKU1 11/18/2023 Not Detected     Coronavirus NL63 11/18/2023 Not Detected     Coronavirus OC43 11/18/2023 Not Detected     Human Metapneumovirus 11/18/2023 Not Detected     Rhino/Enterovirus 11/18/2023 Not Detected     Influenza A 11/18/2023 Not Detected     Influenza B 11/18/2023 Not Detected     Mycoplasma pneumoniae 11/18/2023 Not Detected     Parainfluenza 1 11/18/2023 Not Detected     Parainfluenza 2 11/18/2023 Not Detected     Parainfluenza 3 11/18/2023 Not Detected     Parainfluenza 4 11/18/2023 Not Detected     Respiratory Syncytial Vi* 11/18/2023 Not Detected     POC Glucose 11/18/2023 129     Ventricular Rate 11/18/2023 69     QRSD Interval 11/18/2023 136     QT Interval 11/18/2023 470     QTC Interval 11/18/2023 503     QRS Axis 11/18/2023 59     T Wave [place] 11/18/2023 46     Strep pneumoniae antigen* 11/18/2023 Negative     Legionella Urinary Antig* 11/18/2023 Negative     Sodium 11/19/2023 137     Potassium 11/19/2023 3.5     Chloride 11/19/2023 104     CO2 11/19/2023 26     ANION GAP 11/19/2023 7     BUN 11/19/2023 14     Creatinine 11/19/2023 0.82     Glucose 11/19/2023 98     Calcium 11/19/2023 8.6     eGFR 11/19/2023 85     WBC 11/19/2023 8.28     RBC 11/19/2023 4.44     Hemoglobin 11/19/2023 12.4     Hematocrit 11/19/2023 38.7     MCV 11/19/2023 87     MCH 11/19/2023 27.9     MCHC 11/19/2023 32.0     RDW 11/19/2023 15.5 (H)     Platelets 21/91/2423 139 (L)     MPV 11/19/2023 9.8     Magnesium 11/19/2023 1.9     Procalcitonin 11/19/2023 0.22     POC Glucose 11/18/2023 93     POC Glucose 11/19/2023 111     POC Glucose 11/19/2023 203 (H)     POC Glucose 11/19/2023 163 (H)     POC Glucose 11/19/2023 131     Sodium 11/20/2023 137     Potassium 11/20/2023 4.1     Chloride 11/20/2023 103     CO2 11/20/2023 27     ANION GAP 11/20/2023 7     BUN 11/20/2023 14     Creatinine 11/20/2023 0.85     Glucose 11/20/2023 107     Calcium 11/20/2023 9.0     eGFR 11/20/2023 84     WBC 11/20/2023 6.92     RBC 11/20/2023 4.62     Hemoglobin 11/20/2023 12.9     Hematocrit 11/20/2023 40.3     MCV 11/20/2023 87     MCH 11/20/2023 27.9     MCHC 11/20/2023 32.0     RDW 11/20/2023 15.6 (H)     Platelets 46/14/6908 141 (L)     MPV 11/20/2023 10.3     Magnesium 11/20/2023 2.3     Procalcitonin 11/20/2023 0.16     Vancomycin Rm 11/20/2023 10.5     POC Glucose 11/20/2023 125      Imaging Studies: I have personally reviewed pertinent films in PACS  EKG, Pathology, and Other Studies: I have personally reviewed pertinent reports. Culture  Lab Results   Component Value Date    BLOODCX No Growth at 24 hrs. 11/18/2023    BLOODCX No Growth at 24 hrs. 11/18/2023    BLOODCX No Growth After 5 Days. 03/23/2023    BLOODCX No Growth After 5 Days. 03/23/2023    BLOODCX No Growth After 5 Days. 02/04/2023    BLOODCX No Growth After 5 Days. 02/04/2023    BLOODCX No Growth After 5 Days. 12/07/2022    BLOODCX No Growth After 5 Days. 12/07/2022    BLOODCX No Growth After 5 Days. 04/19/2022    BLOODCX Bacillus species NOT anthracis (A) 04/19/2022    BLOODCX No Growth After 5 Days.  05/02/2021 BLOODCX No Growth After 5 Days. 05/02/2021    BLOODCX No Growth After 5 Days. 04/15/2021    BLOODCX No Growth After 5 Days. 04/15/2021    BLOODCX No Growth After 5 Days. 04/06/2021    BLOODCX No Growth After 5 Days. 04/06/2021    BLOODCX No Growth After 5 Days. 04/01/2021    BLOODCX No Growth After 5 Days. 04/01/2021    BLOODCX No Growth After 5 Days. 02/27/2019    BLOODCX No Growth After 5 Days. 02/27/2019    BLOODCX No Growth After 5 Days. 11/25/2018    BLOODCX No Growth After 5 Days.  11/25/2018     No results found for: "WOUNDCULT"  Lab Results   Component Value Date    URINECX No Growth <1000 cfu/mL 02/04/2023     Lab Results   Component Value Date    SPUTUMCULTUR 2+ Growth of 04/19/2022       Principal Problem:    Sepsis without acute organ dysfunction (HCC)  Active Problems:    Type 2 diabetes mellitus without complication, without long-term current use of insulin (HCC)    Dyslipidemia    Multiple myeloma not having achieved remission (HCC)    Essential hypertension    Chronic diastolic congestive heart failure (HCC)    Paroxysmal atrial fibrillation (HCC)    Multifocal pneumonia

## 2023-11-20 NOTE — ASSESSMENT & PLAN NOTE
Pt presenting from the urgent care d/t fever sore throat and cough  CXR from urgent care showing multifocal PNA Covid test done at [facility] negative    Pt meeting SIRS with tachypnea and leukocytosis  Source: PNA  LA WNL  Procal elevated-> downward trending  Given levaquin and 1L bolus  PCR Strept A negative     BC are negative to date  CXR final read showed Patchy airspace opacity in the left midlung field which may represent pneumonitis. RPP negative  Urinary antigens are negative  IS ACP mucinex tessalon  Patient was treated with cefepime and vancomycin  ID and pulmonary consult appreciated.   Patient is cleared by infectious disease to be discharged on cefdinir to complete the course

## 2023-11-20 NOTE — ASSESSMENT & PLAN NOTE
Wt Readings from Last 3 Encounters:   11/20/23 68.9 kg (151 lb 14.4 oz)   08/23/23 71.5 kg (157 lb 9.6 oz)   05/30/23 70.8 kg (156 lb)     Patient euvolemic on presentation  Echo done on 5/2/2022 showing an LVEF of 76% systolic function low normal grade 2 diastolic dysfunction aortic valve bioprosthetic valve  I's and O's  Daily weights  Diabetic low-salt diet  We will resume Lasix at discharge

## 2023-11-20 NOTE — TELEPHONE ENCOUNTER
----- Message from Janet Melo DO sent at 11/19/2023  3:26 PM EST -----  Regarding: EGD with dilation  Hey all,    Saw this anu at [address] [place] [address]. He is here for pneumonia, but looks fine. Has chronic dysphagia and was scoped by [facility] in 2019 with dilation. Could we please schedule him for repeat EGD with possible dilation at [facility]? Would schedule at least 2-4 weeks out to let him recover from his pneumonia first. Thanks!

## 2023-11-20 NOTE — DISCHARGE INSTR - AVS FIRST PAGE
Follow-up with PCP in 1 week  Outpatient follow-up with GI for endoscopy.   Repeat chest x-ray in 4 to 6 weeks as outpatient

## 2023-11-20 NOTE — CASE MANAGEMENT
Case Management Discharge Planning Note    Patient name Letty Banks  Location [address] [facility] [place] 232/-37 MRN [id number]  : 1945 Date 2023       Current Admission Date: 2023  Current Admission Diagnosis:Sepsis without acute organ dysfunction 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    Glossitis 2022    Chest pain 2022    Mouth ulcers 2021    Iron deficiency anemia 2021    Gastroesophageal reflux disease with esophagitis 2021    Roberts's esophagus without dysplasia 2019    Schatzki's ring of distal esophagus 2019    Pharyngeal dysphagia 10/11/2019    Personal history of colon cancer 10/11/2019    Paroxysmal atrial fibrillation (HCC)     Leukocytosis 2019    Chronic diastolic congestive heart failure ([address]) 2019    History of aortic valve replacement with bioprosthetic valve 2019    S/P total knee arthroplasty, left 2019    Essential hypertension 2018    Hx of CABG     Primary localized osteoarthritis of right knee 2018    Multiple myeloma not having achieved remission ([address]) 2018    Basal cell carcinoma of skin 2018    Erectile dysfunction 2017    Coronary artery disease involving native heart with angina pectoris, unspecified vessel or lesion type ([address]) 10/07/2014    Malignant tumor of cecum ([address]) 2013    Type 2 diabetes mellitus without complication, without long-term current use of insulin ([address]) 2012    Fatty liver 2012    Lymphoma ([address]) 2012    Dyslipidemia 2012      LOS (days): 2  Geometric Mean LOS (GMLOS) (days):   Days to GMLOS:     OBJECTIVE:  Risk of Unplanned Readmission Score: 17.38         Current admission status: Inpatient Preferred Pharmacy:   [id number] - [address]  [address] [place] [id number]  Phone: [phone] Fax: [phone]    Primary Care Provider: Roseann Segura, DO    Primary Insurance: [facility] [place] REP  Secondary Insurance:     DISCHARGE DETAILS:     CM met with pt to discuss role of CM and any needs prior to discharge. Pt lives w/ spouse in [facility] w/  [address]. Indp PTA and No DME. Pt drives self and is employed. [facility] [address] In [place]. Hx OP PT and HH through [address] No hx STR/DA/MH. Pt's spouse will transport at discharge. No needs anticipated.

## 2023-11-20 NOTE — UTILIZATION REVIEW
Date: 11/20        Day 3: Has surpassed a 2nd midnight with active treatments and services, which include IV Cefepime and IV Vanco, supportive care.

## 2023-11-20 NOTE — ASSESSMENT & PLAN NOTE
Lab Results   Component Value Date    HGBA1C 5.9 (H) 08/16/2023       Recent Labs     11/19/23  1639 11/19/23 2027 11/20/23  0710 11/20/23  1039   POCGLU 163* 131 125 134         Blood Sugar Average: Last 72 hrs:  (P) 136.125  A1c current  ISS  Hold oral meds  ADA diet  Hypoglycemic protocol

## 2023-11-20 NOTE — ASSESSMENT & PLAN NOTE
Follows with [facility] oncology  Hold daratumumab Velcade  Receives dexamethasone with infusion -> hold

## 2023-11-20 NOTE — DISCHARGE SUMMARY
[address]  Discharge- Lul Mccormick 1945, 69 y.o. male MRN: [id number]  Unit/Bed#: -01 Encounter: [id number]  Primary Care Provider: Hi Campbell DO   Date and time admitted to hospital: 11/18/2023  3:12 PM    Multifocal pneumonia  Assessment & Plan  See management above    Paroxysmal atrial fibrillation [facility])  Assessment & Plan  RC metoprolol-> continue  AC not on any     Chronic diastolic congestive heart failure (HCC)  Assessment & Plan  Wt Readings from Last 3 Encounters:   11/20/23 68.9 kg (151 lb 14.4 oz)   08/23/23 71.5 kg (157 lb 9.6 oz)   05/30/23 70.8 kg (156 lb)     Patient euvolemic on presentation  Echo done on 5/2/2022 showing an LVEF of 86% systolic function low normal grade 2 diastolic dysfunction aortic valve bioprosthetic valve  I's and O's  Daily weights  Diabetic low-salt diet  We will resume Lasix at discharge    Essential hypertension  Assessment & Plan  Continue metoprolol  Resume Lasix at discharge    Multiple myeloma not having achieved remission ([address] )  Assessment & Plan  Follows with [facility] oncology  Hold daratumumab Velcade  Receives dexamethasone with infusion -> hold      Dyslipidemia  Assessment & Plan  Continue statin    Type 2 diabetes mellitus without complication, without long-term current use of insulin [facility])  Assessment & Plan  Lab Results   Component Value Date    HGBA1C 5.9 (H) 08/16/2023       Recent Labs     11/19/23  1639 11/19/23 2027 11/20/23  0710 11/20/23  1039   POCGLU 163* 131 125 134         Blood Sugar Average: Last 72 hrs:  (P) 136.125  A1c current  ISS  Hold oral meds  ADA diet  Hypoglycemic protocol     * Sepsis without acute organ dysfunction (HCC)  Assessment & Plan  Pt presenting from the urgent care d/t fever sore throat and cough  CXR from urgent care showing multifocal PNA Covid test done at [facility] negative    Pt meeting SIRS with tachypnea and leukocytosis  Source: PNA  LA WNL  Procal elevated-> downward trending  Given levaquin and 1L bolus  PCR Strept A negative     BC are negative to date  CXR final read showed Patchy airspace opacity in the left midlung field which may represent pneumonitis. RPP negative  Urinary antigens are negative  IS ACP mucinex tessalon  Patient was treated with cefepime and vancomycin  ID and pulmonary consult appreciated. Patient is cleared by infectious disease to be discharged on cefdinir to complete the course      Hospital Course:     Mychal Huynh is a 69 y.o. male patient who originally presented to the hospital on   Admission Orders (From admission, onward)       Ordered        11/18/23 [facility]  Once                         due to cough, sore throat and fever. Presented to the urgent care chest x-ray was done as well as COVID test and was shown to have multifocal pneumonia. Patient was admitted with sepsis possibly secondary to upper respiratory infection. Patient was started on cefepime and vancomycin. Patient was seen by pulmonary and infectious disease. Blood cultures are negative to date. Patient was seen by GI and they recommended outpatient follow-up for endoscopy. Patient is feeling back to baseline and is saturating well on room air. Patient does not require home O2 on discharge. Patient is cleared to be discharged on cefdinir for 5 more days  On Exam-  Chest-bilateral air entry, clear to auscultation  Abdomen-soft, nontender  Heart-S1 S2 regular  Extremities-no pedal edema or calf tenderness  Neuro-alert awake oriented x3. No focal deficits    Please see above list of diagnoses and related plan for additional information. Follow-up with PCP in 1 week  Outpatient follow-up with GI for endoscopy. Repeat chest x-ray in 4 to 6 weeks as outpatient    Condition at Discharge:  good      Discharge instructions/Information to patient and family:   See after visit summary for information provided to patient and family.       Provisions for Follow-Up Care:  See after visit summary for information related to follow-up care and any pertinent home health orders. Disposition:     Home       Discharge Statement:  I spent 40 minutes discharging the patient. This time was spent on the day of discharge. I had direct contact with the patient on the day of discharge. Greater than 50% of the total time was spent examining patient, answering all patient questions, arranging and discussing plan of care with patient as well as directly providing post-discharge instructions. Additional time then spent on discharge activities. Discharge Medications:  See after visit summary for reconciled discharge medications provided to patient and family.       ** Please Note: This note has been constructed using a voice recognition system **

## 2023-11-20 NOTE — PROGRESS NOTES
Geovany Rae is a 69 y.o. male who is currently ordered Vancomycin IV with management by the Pharmacy Consult service. Relevant clinical data and objective / subjective history reviewed. Vancomycin Assessment:  Indication and Goal AUC/Trough: Pneumonia (goal -600, trough >10)  Clinical Status: stable  Micro:     Renal Function:  SCr: 0.85 mg/dL  CrCl: 72.1 mL/min  Renal replacement: Not on dialysis  Days of Therapy: 3  Current Dose: 1250mg every 24 hours  Vancomycin Plan: random level resulted 10.5- extrapolated trough is ~ 8, subtherapeutic. Thus, dose changed as below. New Dosinmg every 12 hours  Estimated AUC: 411 mcg*hr/mL  Estimated Trough: 13 mcg/mL  Next Level: 23 at 0530  Renal Function Monitoring: Daily BMP and [place] will continue to follow closely for s/sx of nephrotoxicity, infusion reactions and appropriateness of therapy. BMP and CBC will be ordered per protocol. IF no MRSA risk and MRSA swab is negative, please consider de-escalating antibiotics. We will continue to follow the patient’s culture results and clinical progress daily. Also, cefepime will be adjusted renally if necessary.     Nathaly Pereyra, Pharmacist, PharmD, BCPS

## 2023-11-21 ENCOUNTER — OFFICE VISIT (OUTPATIENT)
Dept: FAMILY MEDICINE CLINIC | Facility: HOSPITAL | Age: 78
End: 2023-11-21
Payer: COMMERCIAL

## 2023-11-21 VITALS
SYSTOLIC BLOOD PRESSURE: 112 MMHG | HEIGHT: 70 IN | HEART RATE: 68 BPM | WEIGHT: 155.8 LBS | DIASTOLIC BLOOD PRESSURE: 64 MMHG | BODY MASS INDEX: 22.3 KG/M2 | TEMPERATURE: 96.3 F

## 2023-11-21 DIAGNOSIS — E11.9 TYPE 2 DIABETES MELLITUS WITHOUT COMPLICATION, WITHOUT LONG-TERM CURRENT USE OF INSULIN (HCC): Chronic | ICD-10-CM

## 2023-11-21 DIAGNOSIS — J18.9 MULTIFOCAL PNEUMONIA: ICD-10-CM

## 2023-11-21 DIAGNOSIS — D69.6 PLATELETS DECREASED (HCC): ICD-10-CM

## 2023-11-21 DIAGNOSIS — D83.9 CVID (COMMON VARIABLE IMMUNODEFICIENCY) (HCC): ICD-10-CM

## 2023-11-21 DIAGNOSIS — A41.9 SEPSIS WITHOUT ACUTE ORGAN DYSFUNCTION, DUE TO UNSPECIFIED ORGANISM (HCC): ICD-10-CM

## 2023-11-21 DIAGNOSIS — C90.00 MULTIPLE MYELOMA NOT HAVING ACHIEVED REMISSION (HCC): ICD-10-CM

## 2023-11-21 DIAGNOSIS — Z09 HOSPITAL DISCHARGE FOLLOW-UP: Primary | ICD-10-CM

## 2023-11-21 PROCEDURE — 99496 TRANSJ CARE MGMT HIGH F2F 7D: CPT | Performed by: INTERNAL MEDICINE

## 2023-11-21 RX ORDER — FLUOROURACIL 50 MG/G
CREAM TOPICAL 2 TIMES DAILY
COMMUNITY
Start: 2023-10-18

## 2023-11-21 NOTE — PROGRESS NOTES
Assessment & Plan     1. Hospital discharge follow-up    2. Multifocal pneumonia  Assessment & Plan:  Finishing Cefdinir as directed, repeat CXR in 6 wks - order given, call with new/worse symptoms    Orders:  -     XR chest pa & lateral; Future; Expected date: 01/08/2024    3. Sepsis without acute organ dysfunction, due to unspecified organism [facility])  Assessment & Plan:  Tx underlying pneumonia with abx and IVF given, procalcitonin back to nml and lactate remained wnl, call with recurrent symptoms      4. Multiple myeloma not having achieved remission ([address])  Assessment & Plan:  Following with Onc, con't tx/labs and f/u as per Onc      5. CVID (common variable immunodeficiency) ([address])  Assessment & Plan:  Receiving IVIG as per Heme/Onc, on Valtrex, con't tx and follow up as per specialists      6. Type 2 diabetes mellitus without complication, without long-term current use of insulin ([address])  Assessment & Plan:    Lab Results   Component Value Date    HGBA1C 5.9 (H) 08/16/2023   DM type 2 without complications - controlled with A1c 5.9- has BW and labs ordered by ROSALINA Vang on DM foot exam (5/23) and eye exam (8/23), on Lipitor and ASA, will follow as well      7. Platelets decreased ([address])  Assessment & Plan:  Stable, will monitor, has frequent BW with Heme/Onc, call with clinically significant bleeding/bruising      Colonoscopy 1/21 - no longer needed d/t age    BW 8/23 (A1C 5.9)  DM foot exam 5/23  DM eye exam 8/23 - 2 yrs       Subjective     Transitional Care Management Review:   Binta Sanchez is a 69 y.o. male here for TCM follow up. Pt was admitted to [facility] [place] from 11/18/23 to 11/20/23. Records were reviewed by myself in detail and events are summarized below.     During the TCM phone call patient stated:  TCM Call       Date and time call was made  11/20/2023  3:15 PM    Hospital care reviewed  Records reviewed    Patient was hospitialized at  [address] [place] [address]    Date of Admission  11/18/23    Date of discharge  11/20/23    Diagnosis  Sepsis without acute organ dysfunction    Disposition  Home    Were the patients medications reviewed and updated  No    Current Symptoms  Weakness    Loose stool severity  Moderate    Weakness severity  Mild          TCM Call       Post hospital issues  None    Should patient be enrolled in anticoag monitoring? No    Scheduled for follow up? Yes    Not clinically warranted  Patient already seen for TCM in the last 30 days    Patients specialists  Pulmonlolgist    Pulmonologist name  Yoselyn Gómez MD    Pulmonologist contact #  [phone]    Endocrinologist name  [phone]    Referrals needed  Referral in [facility]    Did you obtain your prescribed medications  Yes    Why were you unable to obtain your medications  No new medications    Do you need help managing your prescriptions or medications  No    Is transportation to your appointment needed  No    I have advised the patient to call PCP with any new or worsening symptoms  Sy Montiel MA    Living Arrangements  Family members    Are you recieving any outpatient services  No    Are you recieving home care services  Yes    Types of home care services  Nurse visit    Are you using any community resources  No    Current waiver services  No    Have you fallen in the last 12 months  No    Interperter language line needed  No    Counseling  Patient    Comments  TCM not warranted at this time as he is getting surgery 4/28/22          HPI Pt presented to [facility] [place] ED on 11/18/23 after being transferred there from [address] [place] [address]. Pt states he woke that am with F (100.8) /cough/ST and SOB. At [address] [place] [address] he was told he had B/L pneumonia and was referred to the ED d/t his immunocompromised state d/t his MM tx. In the ED RR was 24 and O2 sat 96%, temp was 98.4. Exam notable for rhonchi. Work up in ED showed procalcitonin was up 0.32 but lactate was nml. WBC up at 13.41 with increase in neutrophils.   Rest of CBC/BMP was normal.  CXR showed: patchy airspace opacity in the left midlung field which may represent pneumonitis. Questionable additional bilateral parahilar infiltrates. Pt was admitted for sepsis with multifocal PNA. He was given IVF and IV abx. His strep/respiratory panel, BC x 2 and Urine Legionella and Strep pneumo were all neg. Pulm and ID were consulted. Pulm recommended OP swallow eval for dysphagia and con't abx as per ID. ID felt abx could be switched to oral Cefdnir and finish out 5 more days of tx. GI was consulted and they recommended OP EGD for dysphagia. Pt improved clinically and was discharged home on 11/20/23. Med list reviewed    Pt has been good since returning home. He is picking up Cefdnir today to finish 5 days of abx. He notes no N/V/worsening diarrhea. He notes no further F/C and denies cough/cold symptoms. He feels fine otherwise and states energy level is good and he is eating and drinking well as urinating w/o issues. He is sleeping well. He checks his sugars once daily and states in the am they are usually 120's. Review of Systems   Constitutional:  Positive for appetite change. Negative for chills, fatigue and fever. HENT:  Negative for congestion and sore throat. Eyes:  Negative for pain and visual disturbance. Respiratory:  Negative for cough, shortness of breath and wheezing. Cardiovascular:  Negative for chest pain, palpitations and leg swelling. Gastrointestinal:  Positive for diarrhea. Negative for abdominal pain, blood in stool, constipation, nausea and vomiting. Genitourinary:  Negative for difficulty urinating and dysuria. Musculoskeletal:  Negative for arthralgias and myalgias. Skin:  Negative for rash and wound. Neurological:  Positive for dizziness. Negative for headaches. Hematological:  Bruises/bleeds easily. Psychiatric/Behavioral:  Negative for confusion and sleep disturbance.         Objective     /64   Pulse 68   Temp (!) 96.3 °F (35.7 °C) (Tympanic)   Ht 5' 10" (1.778 m)   Wt 70.7 kg (155 lb 12.8 oz)   BMI 22.35 kg/m²      Physical Exam  Vitals and nursing note reviewed. Constitutional:       General: He is not in acute distress. Appearance: He is well-developed. Comments: Chronically ill appearing   HENT:      Head: Normocephalic and atraumatic. Right Ear: External ear normal.      Left Ear: External ear normal.   Eyes:      General:         Right eye: No discharge. Left eye: No discharge. Conjunctiva/sclera: Conjunctivae normal.   Neck:      Trachea: No tracheal deviation. Cardiovascular:      Rate and Rhythm: Normal rate and regular rhythm. Heart sounds: No murmur heard. Pulmonary:      Effort: Pulmonary effort is normal. No respiratory distress. Breath sounds: Normal breath sounds. No wheezing, rhonchi or rales. Abdominal:      General: There is no distension. Palpations: Abdomen is soft. Tenderness: There is no abdominal tenderness. There is no guarding or rebound. Musculoskeletal:      Cervical back: Neck supple. Right lower leg: No edema. Left lower leg: No edema. Lymphadenopathy:      Cervical: No cervical adenopathy. Skin:     General: Skin is warm and dry. Coloration: Skin is not pale. Findings: Bruising present. No rash. Neurological:      General: No focal deficit present. Mental Status: He is alert. Mental status is at baseline. Motor: No abnormal muscle tone. Gait: Gait normal.   Psychiatric:         Mood and Affect: Mood normal.         Behavior: Behavior normal.         Thought Content:  Thought content normal.         Judgment: Judgment normal.       Medications have been reviewed by provider in current encounter    Marc Noguera DO

## 2023-11-21 NOTE — UTILIZATION REVIEW
Initial Clinical Review    Admission: Date/Time/Statement:   Admission Orders (From admission, onward)       Ordered        11/18/23 1614  INPATIENT ADMISSION  Once                          Orders Placed This Encounter   Procedures    INPATIENT ADMISSION     Standing Status:   Standing     Number of Occurrences:   1     Order Specific Question:   Level of Care     Answer:   Med Surg [16]     Order Specific Question:   Estimated length of stay     Answer:   More than 2 Midnights     Order Specific Question:   Certification     Answer:   I certify that inpatient services are medically necessary for this patient for a duration of greater than two midnights. See H&P and MD Progress Notes for additional information about the patient's course of treatment. ED Arrival Information       Expected   -    Arrival   11/18/2023 13:42    Acuity   Urgent              Means of arrival   Walk-In    Escorted by   Self    Service   Hospitalist    Admission type   Emergency              Arrival complaint   sent from urgent care - pneumonia             Chief Complaint   Patient presents with    Fever     Pt sent over from . Pt reports waking up this morning with a fever and sore throat. Slight cough. Initial Presentation: 69 y.o. male  PMH of MM, HTN, HLD, DM, CHF, Afib who presents with a cough, sore throat and fever. Presented to the urgent care chest x-ray was done as well as COVID test and was shown to have multifocal pneumonia. Pt tachypnea and leukocytosis  13.4 procal elevated 0.32. received IVF, IV levaquin   Admitted inpatient Sepsis 2/2 multifocal pneumonia  Started IV Cefepime Vanco consult pulmonary and ID d/t immunocompromised state  Chronic diastolic CHF hold lasix for now due to sepsis  Multiple myeloma not having achieved remission. Follow [facility] oncology continue dexamethasone, hold daratumumab Velcade.  Defer stress dose steroids at this time  Date: 11/19/23   Day 2:   Pulmonary Consult  Fever at home increased cough mild hemoptysis concern for community acquired pneumonia/tracheobronchitis . Cxr showed patch infiltrates may be atypical pneumonia. Defer to ID abx. Pt hx esophageal stricture past possible aspiration consider speech eval. On palliative chemo and also on IVIG monthly for recurrent infection f/u with own oncologist. [facility]             ED Triage Vitals   Temperature Pulse Respirations Blood Pressure SpO2   11/18/23 1413 11/18/23 1412 11/18/23 1412 11/18/23 1412 11/18/23 1412   98.4 °F (36.9 °C) 77 (!) 24 110/53 96 %      Temp Source Heart Rate Source Patient Position - Orthostatic VS BP Location FiO2 (%)   11/18/23 1412 11/18/23 1412 11/19/23 1530 11/19/23 1530 --   Oral Monitor Lying Left arm       Pain Score       11/18/23 1948       No Pain          Wt Readings from Last 1 Encounters:   11/20/23 68.9 kg (151 lb 14.4 oz)     Additional Vital Signs:   1/19/23 07:57:56 97.5 °F (36.4 °C) 74 -- 151/70 97 97 % --   11/18/23 2224 -- 81 18 -- -- 99 % --   11/18/23 21:08:22 97.5 °F (36.4 °C) 72 -- 142/65 91 96 % --   11/18/23 1948 98.4 °F (36.9 °C) -- 18 118/55 -- -- None (Room air)   11/18/23 17:06:21 -- 64 -- 118/55 76 97 %      Pertinent Labs/Diagnostic Test Results:   XR chest portable   Final Result by Klaudia Gandhi MD (11/20 1114)      Patchy airspace opacity in the left midlung field which may represent pneumonitis. Questionable additional bilateral parahilar infiltrates.                   Workstation performed: [id number]           Results from last 7 days   Lab Units 11/18/23  1725   SARS-COV-2  Not Detected       Results from last 7 days   Lab Units 11/20/23  0413 11/19/23  0415 11/18/23  1419   WBC Thousand/uL 6.92 8.28 13.41*   HEMOGLOBIN g/dL 12.9 12.4 13.3   HEMATOCRIT % 40.3 38.7 41.6   PLATELETS Thousands/uL 141* 139* 153   NEUTROS ABS Thousands/µL  --   --  10.07*           Results from last 7 days   Lab Units 11/20/23  0413 11/19/23  0415 11/18/23  1419   SODIUM mmol/L 137 137 136 POTASSIUM mmol/L 4.1 3.5 3.6   CHLORIDE mmol/L 103 104 100   CO2 mmol/L 27 26 28   ANION GAP mmol/L 7 7 8   BUN mg/dL 14 14 17   CREATININE mg/dL 0.85 0.82 0.98   EGFR ml/min/1.73sq m 84 85 74   CALCIUM mg/dL 9.0 8.6 9.3   MAGNESIUM mg/dL 2.3 1.9  --            Results from last 7 days   Lab Units 11/20/23  1039 11/20/23  0710 11/19/23  2027 11/19/23  1639 11/19/23  1117 11/19/23  0756 11/18/23  2105 11/18/23  1721   POC GLUCOSE mg/dl 134 125 131 163* 203* 111 93 129       Results from last 7 days   Lab Units 11/20/23  0413 11/19/23  0415 11/18/23  1419   GLUCOSE RANDOM mg/dL 107 98 104       Results from last 7 days   Lab Units 11/20/23  0413 11/19/23  0415 11/18/23  1419   PROCALCITONIN ng/ml 0.16 0.22 0.32*       Results from last 7 days   Lab Units 11/18/23  1614   LACTIC ACID mmol/L 0.9         Results from last 7 days   Lab Units 11/18/23  2113 11/18/23  1725   STREP PNEUMONIAE ANTIGEN, URINE  Negative  --    LEGIONELLA URINARY ANTIGEN  Negative  --    RESPIRATORY SYNCYTIAL VIRUS   --  Not Detected       Results from last 7 days   Lab Units 11/18/23  1725   ADENOVIRUS  Not Detected   BORDETELLA PARAPERTUSSIS  Not Detected   BORDETELLA PERTUSSIS  Not Detected   CHLAMYDIA PNEUMONIAE  Not Detected   CORONAVIRUS 229E  Not Detected   CORONAVIRUS HKU1  Not Detected   CORONAVIRUS NL63  Not Detected   CORONAVIRUS OC43  Not Detected   METAPNEUMOVIRUS  Not Detected   RHINOVIRUS  Not Detected   MYCOPLASMA PNEUMONIAE  Not Detected   PARAINFLUENZA 1  Not Detected   PARAINFLUENZA 2  Not Detected   PARAINFLUENZA 3  Not Detected   PARAINFLUENZA 4  Not Detected         Results from last 7 days   Lab Units 11/18/23  1614   BLOOD CULTURE  No Growth at 48 hrs. No Growth at 48 hrs.            ED Treatment:   Medication Administration from 11/18/2023 1342 to 11/18/2023 1649         Date/Time Order Dose Route Action Action by Comments     11/18/2023 1615 EST levofloxacin (LEVAQUIN) IVPB (premix in dextrose) 750 mg 150 mL 750 mg Intravenous [place] Leonides Blizzard, RN --     11/18/2023 1616 EST sodium chloride 0.9 % bolus 1,000 mL 1,000 mL Intravenous [place] Leonides Blizzard, RN --          Past Medical History:   Diagnosis Date    Acute respiratory failure with hypoxia ([address]) 04/20/2022    Arthritis     Cancer (HCC)     Cataract     Colitis     Colon polyp     Fatty liver     History of chemotherapy     History of radiation therapy     History of shingles 2018    History of transfusion     Hyperlipidemia     Hypertension      Present on Admission:   Type 2 diabetes mellitus without complication, without long-term current use of insulin (HCC)   Dyslipidemia   Multiple myeloma not having achieved remission (HCC)   Essential hypertension   Chronic diastolic congestive heart failure (HCC)   Paroxysmal atrial fibrillation (HCC)   Sepsis without acute organ dysfunction (HCC)   Multifocal pneumonia      Admitting Diagnosis: Pneumonia [J18.9]  Fever [R50.9]  Multiple myeloma not having achieved remission ([address]) [C90.00]  Sepsis ([address]) [A41.9]  Multifocal pneumonia [J18.9]  Sepsis without acute organ dysfunction, due to unspecified organism [facility]) [A41.9]  Age/Sex: 69 y.o. male  Admission Orders:  Med surg  Pt ot  Airway clearance   Vanco random  Bmp panel   Cbc plt  Mg   Procalcitonin  Sputum gs cx  Mrsa cx  Scheduled Medications:  No current facility-administered medications for this encounter. Continuous IV Infusions:  No current facility-administered medications for this encounter. PRN Meds:  No current facility-administered medications for this encounter. IP CONSULT TO PULMONOLOGY  IP CONSULT TO INFECTIOUS DISEASES  IP CONSULT TO GASTROENTEROLOGY    Network Utilization Review Department  ATTENTION: Please call with any questions or concerns to [phone] and carefully listen to the prompts so that you are directed to the right person.  All voicemails are confidential.   For Discharge needs, contact Care Management DC Support Team at [phone] opt. 2  Send all requests for admission clinical reviews, approved or denied determinations and any other requests to dedicated fax number below belonging to the campus where the patient is receiving treatment.  List of dedicated fax numbers for the Facilities:  [place] DENIALS (Administrative/Medical Necessity) [phone]   DISCHARGE SUPPORT TEAM (NETWORK) [address] [facility] (Maternity/NICU/Pediatrics) [phone]   [address] [facility] 1000 [facility] [phone]   [facility] [address] [place] [address] [address] [address] [facility] [address] [facility] [address] [phone]

## 2023-11-21 NOTE — ASSESSMENT & PLAN NOTE
Lab Results   Component Value Date    HGBA1C 5.9 (H) 08/16/2023   DM type 2 without complications - controlled with A1c 5.9- has BW and labs ordered by ROSALINA Vang on DM foot exam (5/23) and eye exam (8/23), on Lipitor and ASA, will follow as well

## 2023-11-21 NOTE — ASSESSMENT & PLAN NOTE
Stable, will monitor, has frequent BW with Heme/Onc, call with clinically significant bleeding/bruising

## 2023-11-21 NOTE — ASSESSMENT & PLAN NOTE
Tx underlying pneumonia with abx and IVF given, procalcitonin back to nml and lactate remained wnl, call with recurrent symptoms

## 2023-11-24 LAB
BACTERIA BLD CULT: NORMAL
BACTERIA BLD CULT: NORMAL

## 2023-11-27 ENCOUNTER — PREP FOR PROCEDURE (OUTPATIENT)
Age: 78
End: 2023-11-27

## 2023-11-27 DIAGNOSIS — R13.13 PHARYNGEAL DYSPHAGIA: Primary | ICD-10-CM

## 2023-11-27 NOTE — TELEPHONE ENCOUNTER
Patient returned phone call.   Scheduled date of EGD(as of today): 1/15/24  Physician performing EGD:   Location of EGD: [facility]  Instructions reviewed with patient by: md  Clearances:

## 2023-11-27 NOTE — TELEPHONE ENCOUNTER
1st call-lvm for pt on cell to schedule EGD at [facility]-1 month out per Dr Roshan Martin who saw pt when pt was inpt at [facility]

## 2023-12-05 DIAGNOSIS — E11.9 TYPE 2 DIABETES MELLITUS WITHOUT COMPLICATION, WITHOUT LONG-TERM CURRENT USE OF INSULIN (HCC): Chronic | ICD-10-CM

## 2023-12-05 RX ORDER — METFORMIN HYDROCHLORIDE 500 MG/1
TABLET, EXTENDED RELEASE ORAL
Qty: 120 TABLET | Refills: 3 | Status: SHIPPED | OUTPATIENT
Start: 2023-12-05

## 2023-12-06 ENCOUNTER — APPOINTMENT (RX ONLY)
Dept: URBAN - METROPOLITAN AREA CLINIC 26 | Facility: CLINIC | Age: 78
Setting detail: DERMATOLOGY
End: 2023-12-06

## 2023-12-06 PROBLEM — C44.42 SQUAMOUS CELL CARCINOMA OF SKIN OF SCALP AND NECK: Status: ACTIVE | Noted: 2023-12-06

## 2023-12-06 PROCEDURE — 17311 MOHS 1 STAGE H/N/HF/G: CPT

## 2023-12-06 PROCEDURE — 17312 MOHS ADDL STAGE: CPT

## 2023-12-06 PROCEDURE — ? MOHS SURGERY

## 2023-12-06 PROCEDURE — 13121 CMPLX RPR S/A/L 2.6-7.5 CM: CPT

## 2023-12-06 NOTE — PROCEDURE: MOHS SURGERY
Mohs Case Number: 
Date Of Previous Biopsy (Optional): 10.17.2023
Previous Accession (Optional): [id number]
Biopsy Photograph Reviewed: No (no photograph available)
Referring Physician (Optional): Mariah Valentin PA-C
Consent Type: Consent (Scalp Lesion)
Eye Shield Used: No
Initial Size Of Lesion: 1.5
X Size Of Lesion In Cm (Optional): 1
Number Of Stages: 2
Primary Defect Length In Cm (Final Defect Size - Required For Flaps/Grafts): 2.7
Primary Defect Width In Cm (Final Defect Size - Required For Flaps/Grafts): 2.3
Repair Type: Complex Repair
Oculoplastic Surgeon Procedure Text (A): After obtaining clear surgical margins the patient was sent to oculoplastics for surgical repair.  The patient understands they will receive post-surgical care and follow-up from the referring physician's office.
Otolaryngologist Procedure Text (A): After obtaining clear surgical margins the patient was sent to otolaryngology for surgical repair.  The patient understands they will receive post-surgical care and follow-up from the referring physician's office.
Plastic Surgeon Procedure Text (A): After obtaining clear surgical margins the patient was sent to plastics for surgical repair.  The patient understands they will receive post-surgical care and follow-up from the referring physician's office.
Mid-Level Procedure Text (A): After obtaining clear surgical margins the patient was sent to a mid-level provider for surgical repair.  The patient understands they will receive post-surgical care and follow-up from the mid-level provider.
Provider Procedure Text (A): After obtaining clear surgical margins the defect was repaired by another provider.
Asc Procedure Text (A): After obtaining clear surgical margins the patient was sent to an ASC for surgical repair.  The patient understands they will receive post-surgical care and follow-up from the ASC physician.
Simple / Intermediate / Complex Repair - Final Wound Length In Cm: 6.1
Suturegard Retention Suture: 2-0 Nylon
Retention Suture Bite Size: 3 mm
Length To Time In Minutes Device Was In Place: 10
Complex Requirements: Extensive Undermining Performed?: Yes
Distance Of Undermining In Cm (Required): 2.5
Undermining Type: Entire Wound
Debridement Text: The wound edges were debrided prior to proceeding with the closure to facilitate wound healing.
Helical Rim Text: The closure involved the helical rim.
Vermilion Border Text: The closure involved the vermilion border.
Nostril Rim Text: The closure involved the nostril rim.
Retention Suture Text: Retention sutures were placed to support the closure and prevent dehiscence.
Secondary Defect Length In Cm (Required For Flaps): 0
Location Indication Override (Is Already Calculated Based On Selected Body Location): Area M
Area H Indication Text: Tumors in this location are included in Area H (eyelids, eyebrows, central face, nose, lips, chin, ear, pre-auricular, post-auricular, temple, genitalia, hands, feet, ankles and areola).  Tissue conservation is critical in these anatomic locations.
Area M Indication Text: Tumors in this location are included in Area M (cheek, forehead, scalp, neck, jawline and pretibial skin).  Mohs surgery is indicated for tumors in these anatomic locations.
Area L Indication Text: Tumors in this location are included in Area L (trunk and extremities).  Mohs surgery is indicated for larger tumors, or tumors with aggressive histologic features, in these anatomic locations.
Tumor Debulked?: scalpel
Depth Of Tumor Invasion (For Histology): epidermis
Perineural Invasion (For Histology - Be Specific If Possible): absent
Presence Of Scar Tissue (For Histology): present
Special Stains Stage 1 - Results: Base On Clearance Noted Above
Stage 2: Additional Anesthesia Type: 1% lidocaine with 1:100,000 epinephrine
Staging Info: By selecting yes to the question above you will include information on AJCC 8 tumor staging in your Mohs note. Information on tumor staging will be automatically added for SCCs on the head and neck. AJCC 8 includes tumor size, tumor depth, perineural involvement and bone invasion.
Tumor Depth: Less than 6mm from granular layer and no invasion beyond the subcutaneous fat
Why Was The Change Made?: Please Select the Appropriate Response
Medical Necessity Statement: Based on my medical judgement, Mohs surgery is the most appropriate treatment for this cancer compared to other treatments.
Alternatives Discussed Intro (Do Not Add Period): I discussed alternative treatments to Mohs surgery and specifically discussed the risks and benefits of
Consent 1/Introductory Paragraph: The rationale for Mohs was explained to the patient and consent was obtained. The risks, benefits and alternatives to therapy were discussed in detail. Specifically, the risks of infection, scarring, bleeding, prolonged wound healing, incomplete removal, allergy to anesthesia, nerve injury and recurrence were addressed. Prior to the procedure, the treatment site was clearly identified and confirmed by the patient using a hand mirror. All components of Universal Protocol/PAUSE Rule completed.
Consent 2/Introductory Paragraph: I explained to the patient that excisional surgery with margin control is the standard treatment for melanoma.  Mohs Surgery, which is a form of excisional surgery with margin control, over the past few decades has become a treatment within that standard of care for melanoma.  Many now consider it the standard of care for melanoma, especially melanoma in situ, on the head and neck given its more complete margin examination, and evidence supporting higher cure rate compared with conventional excision.  In this case, initial margins of 0.6 cm, in excess of the standard margins of 0.5 cm were employed for the first stage of Mohs Surgery.  \\n\\nMohs surgery was explained to the patient and consent was obtained. The risks, benefits and alternatives to therapy were discussed in detail. Specifically, the risks of infection, scarring, bleeding, prolonged wound healing, incomplete removal, allergy to anesthesia, nerve injury and recurrence were addressed. Prior to the procedure, the treatment site was clearly identified and confirmed by the patient  using a hand mirror. All components of Universal Protocol/PAUSE Rule completed.  \\n\\nMART-1 (Melanoma Antigen Recognized by T-cells) antibody immunostaining was used during Mohs surgery as per standard protocol, in addition to routine processing of all specimens with hematoxylin and eosin. One or more of the reagents used in immunohistochemical testing in this case may not have been cleared or approved by the [place]. Food and Drug Administration (FDA). The FDA has determined that such clearance or approval is not necessary. These tests are used for clinical purposes. They should not be regarded as investigational or for research.
Consent 3/Introductory Paragraph: I gave the patient a chance to ask questions they had about the procedure.  Following this I explained the Mohs procedure and consent was obtained. The risks, benefits and alternatives to therapy were discussed in detail. Specifically, the risks of infection, scarring, bleeding, prolonged wound healing, incomplete removal, allergy to anesthesia, nerve injury and recurrence were addressed. Prior to the procedure, the treatment site was clearly identified and confirmed by the patient using a hand mirror. All components of Universal Protocol/PAUSE Rule completed.
Consent (Temporal Branch)/Introductory Paragraph: The rationale for Mohs was explained to the patient and consent was obtained. The risks, benefits and alternatives to therapy were discussed in detail. Specifically, the risks of damage to the temporal branch of the facial nerve, infection, scarring, bleeding, prolonged wound healing, incomplete removal, allergy to anesthesia, and recurrence were addressed. Prior to the procedure, the treatment site was clearly identified and confirmed by the patient using a hand mirror. All components of Universal Protocol/PAUSE Rule completed.
Consent (Marginal Mandibular)/Introductory Paragraph: The rationale for Mohs was explained to the patient and consent was obtained. The risks, benefits and alternatives to therapy were discussed in detail. Specifically, the risks of damage to the marginal mandibular branch of the facial nerve, infection, scarring, bleeding, prolonged wound healing, incomplete removal, allergy to anesthesia, and recurrence were addressed. Prior to the procedure, the treatment site was clearly identified and confirmed by the patient using a hand mirror. All components of Universal Protocol/PAUSE Rule completed.
Consent (Spinal Accessory)/Introductory Paragraph: The rationale for Mohs was explained to the patient and consent was obtained. The risks, benefits and alternatives to therapy were discussed in detail. Specifically, the risks of damage to the spinal accessory nerve, infection, scarring, bleeding, prolonged wound healing, incomplete removal, allergy to anesthesia, and recurrence were addressed. Prior to the procedure, the treatment site was clearly identified and confirmed by the patient using a hand mirror. All components of Universal Protocol/PAUSE Rule completed.
Consent (Near Eyelid Margin)/Introductory Paragraph: The rationale for Mohs was explained to the patient and consent was obtained. The risks, benefits and alternatives to therapy were discussed in detail. Specifically, the risks of ectropion or eyelid deformity, infection, scarring, bleeding, prolonged wound healing, incomplete removal, allergy to anesthesia, nerve injury and recurrence were addressed. Prior to the procedure, the treatment site was clearly identified and confirmed by the patient using a hand mirror. All components of Universal Protocol/PAUSE Rule completed.
Consent (Ear)/Introductory Paragraph: The rationale for Mohs was explained to the patient and consent was obtained. The risks, benefits and alternatives to therapy were discussed in detail. Specifically, the risks of ear deformity, infection, scarring, bleeding, prolonged wound healing, incomplete removal, allergy to anesthesia, nerve injury and recurrence were addressed. Prior to the procedure, the treatment site was clearly identified and confirmed by the patient using a hand mirror. All components of Universal Protocol/PAUSE Rule completed.
Consent (Nose)/Introductory Paragraph: The rationale for Mohs was explained to the patient and consent was obtained. The risks, benefits and alternatives to therapy were discussed in detail. Specifically, the risks of nasal deformity, changes in the flow of air through the nose, infection, scarring, bleeding, prolonged wound healing, incomplete removal, allergy to anesthesia, nerve injury and recurrence were addressed. Prior to the procedure, the treatment site was clearly identified and confirmed by the patient using a hand mirror. All components of Universal Protocol/PAUSE Rule completed.
Consent (Lip)/Introductory Paragraph: The rationale for Mohs was explained to the patient and consent was obtained. The risks, benefits and alternatives to therapy were discussed in detail. Specifically, the risks of lip deformity, changes in the oral aperture, infection, scarring, bleeding, prolonged wound healing, incomplete removal, allergy to anesthesia, nerve injury and recurrence were addressed. Prior to the procedure, the treatment site was clearly identified and confirmed by the patient using a hand mirror. All components of Universal Protocol/PAUSE Rule completed.
Consent (Scalp)/Introductory Paragraph: The rationale for Mohs was explained to the patient and consent was obtained. The risks, benefits and alternatives to therapy were discussed in detail. Specifically, the risks of alopecia in or around the treatment site, changes in hair growth pattern secondary to repair, infection, scarring, bleeding, prolonged wound healing, incomplete removal, allergy to anesthesia, nerve injury and recurrence were addressed. Prior to the procedure, the treatment site was clearly identified and confirmed by the patient using a hand mirror. All components of Universal Protocol/PAUSE Rule completed.
Detail Level: Detailed
Postop Diagnosis: same
Surgeon: Ambrosio Ho M.D.
Anesthesia Type: 1% lidocaine with epinephrine
Hemostasis: Electrodesiccation
Estimated Blood Loss (Cc): minimal
Repair Anesthesia Method: local infiltration
Anesthesia Volume In Cc: 3
Brow Lift Text: A midfrontal incision was made medially to the defect to allow access to the tissues just superior to the left eyebrow. Following careful dissection inferiorly in a supraperiosteal plane to the level of the left eyebrow, several 3-0 monocryl sutures were used to resuspend the eyebrow orbicularis oculi muscular unit to the superior frontal bone periosteum. This resulted in an appropriate reapproximation of static eyebrow symmetry and correction of the left brow ptosis.
Deep Sutures: 4-0 Vicryl
Epidermal Sutures: GluSeal
Suturegard Intro: Intraoperative tissue expansion was performed, utilizing the SUTUREGARD device, in order to reduce wound tension.
Suturegard Body: The suture ends were repeatedly re-tightened and re-clamped to achieve the desired tissue expansion.
Hemigard Intro: Due to skin fragility and wound tension, it was decided to use HEMIGARD adhesive retention suture devices to permit a linear closure. The skin was cleaned and dried for a 6cm distance away from the wound. Excessive hair, if present, was removed to allow for adhesion.
Hemigard Postcare Instructions: The HEMIGARD strips are to remain completely dry for at least 5-7 days.
Donor Site Anesthesia Type: same as repair anesthesia
Graft Basting Suture (Optional): 6-0 Prolene
Graft Donor Site Dermal Sutures (Optional): 5-0 Vicryl
Epidermal Closure Graft Donor Site (Optional): running
Graft Donor Site Bandage (Optional-Leave Blank If You Don't Want In Note): A pressure bandage with telfa was applied to the donor site.
Closure 2 Information: This tab is for additional flaps and grafts, including complex repair and grafts and complex repair and flaps. You can also specify a different location for the additional defect, if the location is the same you do not need to select a new one. We will insert the automated text for the repair you select below just as we do for solitary flaps and grafts. Please note that at this time if you select a location with a different insurance zone you will need to override the ICD10 and CPT if appropriate.
Closure 3 Information: This tab is for additional flaps and grafts above and beyond our usual structured repairs.  Please note if you enter information here it will not currently bill and you will need to add the billing information manually.
Wound Care: Mastisol
Dressing: steri-strips and pressure dressing
Unna Boot Text: An Unna boot was placed to help immobilize the limb and facilitate more rapid healing.
Home Suture Removal Text: Patient was provided instructions on removing sutures and will remove their sutures at home.  If they have any questions or difficulties they will call the office.
Post-Care Instructions: I reviewed with the patient in detail post-care instructions. Patient is not to engage in any strenuous activity for at least 48 hours, and is to avoid heavy lifting, strenuous exercise, or swimming for the next 14 days. Should the patient develop any fevers, chills, bleeding, or severe pain, the patient will contact the office immediately.
Pain Refusal Text: I offered to prescribe pain medication but the patient refused to take this medication.
Mauc Instructions: By selecting yes to the question below the MAUC number will be added into the note.  This will be calculated automatically based on the diagnosis chosen, the size entered, the body zone selected (H,M,L) and the specific indications you chose. You will also have the option to override the Mohs AUC if you disagree with the automatically calculated number and this option is found in the Case Summary tab.
Where Do You Want The Question To Include Opioid Counseling Located?: Case Summary Tab
Eye Protection Verbiage: Before proceeding with the stage, a plastic scleral shield was inserted. The globe was anesthetized with a few drops of 1% lidocaine with 1:100,000 epinephrine. Then, an appropriate sized scleral shield was chosen and coated with lacrilube ointment. The shield was gently inserted and left in place for the duration of each stage. After the stage was completed, the shield was gently removed.
Mohs Method Verbiage: After tumor debulking, and with each stage, an incision was made into a layer of healthy appearing skin around the clinically apparent tissue following the standard Mohs approach.  The specimen was harvested as a microscopically controlled layer.  A map was prepared to correspond to the area of skin from which it was excised.  The tissue was inked, divided as necessary, and prepared for frozen section processing.
Surgeon/Pathologist Verbiage (Will Incorporate Name Of Surgeon From Intro If Not Blank): operated in two distinct and integrated capacities as the surgeon and pathologist.
Mohs Histo Method Verbiage: Each section was then chromacoded and processed in the Mohs laboratory using the Mohs protocol and submitted for tissue processing with frozen sections prepared in the Mohs laboratory. The entire base and margins of the excised tissue were examined by the surgeon.
Subsequent Stages Histo Method Verbiage: Using a similar technique to that described above, a thin layer of tissue was removed from all areas where tumor was visible on the previous stage.  The tissue was again oriented, mapped, dyed, and processed as above.
Mohs Rapid Report Verbiage: The area of clinically evident tumor was marked with skin marking ink and appropriately hatched.  The initial incision was made following the Mohs approach through the skin.  The specimen was taken to the lab, divided into the necessary number of pieces, chromacoded and processed according to the Mohs protocol.  This was repeated in successive stages until a tumor free defect was achieved.
Complex Repair Preamble Text (Leave Blank If You Do Not Want): The wound edges and deep margins of the Mohs defect were debrided of excess dermis and adipose tissue.  Retention sutures encompassing deep dermal, adipose, and fascial layers were placed to alleviate the tension on the primary suture line and to decrease the potential for wound dehiscence.
Crescentic Complex Repair Preamble Text (Leave Blank If You Do Not Want): Three layered closure, including plication of the fascia underlying the defect, as well as extensive wide undermining were performed.
M-Plasty Complex Repair Preamble Text (Leave Blank If You Do Not Want): Extensive wide undermining was performed.
Crescentic Intermediate Repair Preamble Text (Leave Blank If You Do Not Want): Undermining was performed with blunt dissection.
Non-Graft Cartilage Fenestration Text: The cartilage was fenestrated with a 2mm punch biopsy to help facilitate healing.
Graft Cartilage Fenestration Text: The cartilage was fenestrated with a 2mm punch biopsy to help facilitate graft survival and healing.
Secondary Intention Text (Leave Blank If You Do Not Want): After discussion and consideration of repair and wound management options, the patient opted to allow the defect to heal by second intent.
No Repair - Repaired With Adjacent Surgical Defect Text (Leave Blank If You Do Not Want): After obtaining clear surgical margins the defect was repaired concurrently with another surgical defect which was in close approximation.
Adjacent Tissue Transfer Text: The defect edges were debeveled with a #15 scalpel blade.  Given the location of the defect and the proximity to free margins an adjacent tissue transfer was deemed most appropriate.  Using a sterile surgical marker, an appropriate flap was drawn incorporating the defect and placing the expected incisions within the relaxed skin tension lines where possible.    The area thus outlined was incised deep to adipose tissue with a #15 scalpel blade.  The skin margins were undermined to an appropriate distance in all directions utilizing iris scissors.
Advancement Flap (Single) Text: The defect edges were debeveled with a #15 scalpel blade.  Given the location of the defect and the proximity to free margins a single advancement flap was deemed most appropriate.  Using a sterile surgical marker, an appropriate advancement flap was drawn incorporating the defect and placing the expected incisions within the relaxed skin tension lines where possible.    The area thus outlined was incised deep to adipose tissue with a #15 scalpel blade and carried over to the primary defect.  The skin margins were undermined to an appropriate distance in all directions utilizing iris scissors.
Advancement Flap (Double) Text: The defect edges were debeveled with a #15 scalpel blade.  Given the location of the defect and the proximity to free margins a double advancement flap was deemed most appropriate.  Using a sterile surgical marker, the appropriate advancement flaps were drawn incorporating the defect and placing the expected incisions within the relaxed skin tension lines where possible.    The area thus outlined was incised deep to adipose tissue with a #15 scalpel blade and carried over to the primary defect.  The skin margins were undermined to an appropriate distance in all directions utilizing iris scissors.
Burow's Advancement Flap Text: The defect edges were debeveled with a #15 scalpel blade.  Given the location of the defect and the proximity to free margins a Burow's advancement flap was deemed most appropriate.  Using a sterile surgical marker, the appropriate advancement flap was drawn incorporating the defect and placing the expected incisions within the relaxed skin tension lines where possible.    The area thus outlined was incised deep to adipose tissue with a #15 scalpel blade.  The skin margins were undermined to an appropriate distance in all directions utilizing iris scissors.
Chonodrocutaneous Helical Advancement Flap Text: The defect edges were debeveled with a #15 scalpel blade.  Given the location of the defect and the proximity to free margins a chondrocutaneous helical advancement flap was deemed most appropriate.  Using a sterile surgical marker, the appropriate advancement flap was drawn incorporating the defect and placing the expected incisions within the relaxed skin tension lines where possible.    The area thus outlined was incised deep to adipose tissue with a #15 scalpel blade.  The skin margins were undermined to an appropriate distance in all directions utilizing iris scissors.
Crescentic Advancement Flap Text: The defect edges were debeveled with a #15 scalpel blade.  Given the location of the defect and the proximity to free margins a crescentic advancement flap was deemed most appropriate.  Using a sterile surgical marker, the appropriate advancement flap was drawn incorporating the defect and placing the expected incisions within the relaxed skin tension lines where possible.    The area thus outlined was incised deep to adipose tissue with a #15 scalpel blade.  The skin margins were undermined to an appropriate distance in all directions utilizing iris scissors.
A-T Advancement Flap Text: The defect edges were debeveled with a #15 scalpel blade.  Given the location of the defect, shape of the defect and the proximity to free margins an A-T advancement flap was deemed most appropriate.  Using a sterile surgical marker, an appropriate advancement flap was drawn incorporating the defect and placing the expected incisions within the relaxed skin tension lines where possible.    The area thus outlined was incised deep to adipose tissue with a #15 scalpel blade and carried over to the primary defect.  The skin margins were undermined to an appropriate distance in all directions utilizing iris scissors.
O-T Advancement Flap Text: The defect edges were debeveled with a #15 scalpel blade.  Given the location of the defect, shape of the defect and the proximity to free margins an O-T advancement flap was deemed most appropriate.  Using a sterile surgical marker, an appropriate advancement flap was drawn incorporating the defect and placing the expected incisions within the relaxed skin tension lines where possible.    The area thus outlined was incised deep to adipose tissue with a #15 scalpel blade and carried over to the primary defect.  The skin margins were undermined to an appropriate distance in all directions utilizing iris scissors.
O-L Flap Text: The defect edges were debeveled with a #15 scalpel blade.  Given the location of the defect, shape of the defect and the proximity to free margins an O-L flap was deemed most appropriate.  Using a sterile surgical marker, an appropriate advancement flap was drawn incorporating the defect and placing the expected incisions within the relaxed skin tension lines where possible.    The area thus outlined was incised deep to adipose tissue with a #15 scalpel blade.  The skin margins were undermined to an appropriate distance in all directions utilizing iris scissors.
O-Z Flap Text: The defect edges were debeveled with a #15 scalpel blade.  Given the location of the defect, shape of the defect and the proximity to free margins an O-Z flap was deemed most appropriate.  Using a sterile surgical marker, an appropriate transposition flap was drawn incorporating the defect and placing the expected incisions within the relaxed skin tension lines where possible. The area thus outlined was incised deep to adipose tissue with a #15 scalpel blade and carried over to the primary defect.  The skin margins were undermined to an appropriate distance in all directions utilizing iris scissors.
Double O-Z Flap Text: The defect edges were debeveled with a #15 scalpel blade.  Given the location of the defect, shape of the defect and the proximity to free margins a Double O-Z flap was deemed most appropriate.  Using a sterile surgical marker, an appropriate transposition flap was drawn incorporating the defect and placing the expected incisions within the relaxed skin tension lines where possible. The area thus outlined was incised deep to adipose tissue with a #15 scalpel blade.  The skin margins were undermined to an appropriate distance in all directions utilizing iris scissors.
V-Y Flap Text: The defect edges were debeveled with a #15 scalpel blade.  Given the location of the defect, shape of the defect and the proximity to free margins a V-Y flap was deemed most appropriate.  Using a sterile surgical marker, an appropriate advancement flap was drawn incorporating the defect and placing the expected incisions within the relaxed skin tension lines where possible.    The area thus outlined was incised deep to adipose tissue with a #15 scalpel blade.  The skin margins were undermined to an appropriate distance in all directions utilizing iris scissors.
Advancement-Rotation Flap Text: The defect edges were debeveled with a #15 scalpel blade.  Given the location of the defect, shape of the defect and the proximity to free margins an advancement-rotation flap was deemed most appropriate.  Using a sterile surgical marker, an appropriate flap was drawn incorporating the defect and placing the expected incisions within the relaxed skin tension lines where possible. The area thus outlined was incised deep to adipose tissue with a #15 scalpel blade.  The skin margins were undermined to an appropriate distance in all directions utilizing iris scissors.
Mercedes Flap Text: The defect edges were debeveled with a #15 scalpel blade.  Given the location of the defect, shape of the defect and the proximity to free margins a Mercedes flap was deemed most appropriate.  Using a sterile surgical marker, an appropriate advancement flap was drawn incorporating the defect and placing the expected incisions within the relaxed skin tension lines where possible. The area thus outlined was incised deep to adipose tissue with a #15 scalpel blade and carried over to the primary defect.  The skin margins were undermined to an appropriate distance in all directions utilizing iris scissors.
Modified Advancement Flap Text: The defect edges were debeveled with a #15 scalpel blade.  Given the location of the defect, shape of the defect and the proximity to free margins a three point advancement flap was deemed most appropriate.  Using a sterile surgical marker, an appropriate advancement flap was drawn incorporating the defect and placing the expected incisions within the relaxed skin tension lines where possible.   The area thus outlined was incised deep to adipose tissue with a #15 scalpel blade.  Three standing tissue cones were excised.  The skin margins were undermined to an appropriate distance in all directions utilizing iris scissors.
Mucosal Advancement Flap Text: Given the location of the defect, shape of the defect and the proximity to free margins a mucosal advancement flap was deemed most appropriate. Incisions were made with a 15 blade scalpel in the appropriate fashion along the cutaneous vermilion border and the mucosal lip. The remaining actinically damaged mucosal tissue was excised.  The mucosal advancement flap was then elevated to the gingival sulcus with care taken to preserve the neurovascular structures and advanced into the primary defect. Care was taken to ensure that precise realignment of the vermilion border was achieved.
Peng Advancement Flap Text: The defect edges were debeveled with a #15 scalpel blade.  Given the location of the defect, shape of the defect and the proximity to free margins a Peng advancement flap was deemed most appropriate.  Using a sterile surgical marker, an appropriate advancement flap was drawn incorporating the defect and placing the expected incisions within the relaxed skin tension lines where possible. The area thus outlined was incised deep to adipose tissue with a #15 scalpel blade and carried over to the primary defect.  The skin margins were undermined to an appropriate distance in all directions utilizing iris scissors.
Hatchet Flap Text: The defect edges were debeveled with a #15 scalpel blade.  Given the location of the defect, shape of the defect and the proximity to free margins a hatchet flap was deemed most appropriate.  Using a sterile surgical marker, an appropriate hatchet flap was drawn incorporating the defect and placing the expected incisions within the relaxed skin tension lines where possible.    The area thus outlined was incised deep to adipose tissue with a #15 scalpel blade and carried over to the primary defect.  The skin margins were undermined to an appropriate distance in all directions utilizing iris scissors.
Rotation Flap Text: The defect edges were debeveled with a #15 scalpel blade.  Given the location of the defect, shape of the defect and the proximity to free margins a rotation flap was deemed most appropriate.  Using a sterile surgical marker, an appropriate rotation flap was drawn incorporating the defect and placing the expected incisions within the relaxed skin tension lines where possible.    The area thus outlined was incised deep to adipose tissue with a #15 scalpel blade and carried over to the primary defect.  The skin margins were undermined to an appropriate distance in all directions utilizing iris scissors.
Bilateral Rotation Flap Text: The defect edges were debeveled with a #15 scalpel blade. Given the location of the defect, shape of the defect and the proximity to free margins a bilateral rotation flap was deemed most appropriate. Using a sterile surgical marker, an appropriate rotation flap was drawn incorporating the defect and placing the expected incisions within the relaxed skin tension lines where possible. The area thus outlined was incised deep to adipose tissue with a #15 scalpel blade. The skin margins were undermined to an appropriate distance in all directions utilizing iris scissors. Following this, the designed flap was carried over into the primary defect and sutured into place.
Spiral Flap Text: The defect edges were debeveled with a #15 scalpel blade.  Given the location of the defect, shape of the defect and the proximity to free margins a spiral flap was deemed most appropriate.  Using a sterile surgical marker, an appropriate rotation flap was drawn incorporating the defect and placing the expected incisions within the relaxed skin tension lines where possible. The area thus outlined was incised deep to adipose tissue with a #15 scalpel blade and carried over to the primary defect.  The skin margins were undermined to an appropriate distance in all directions utilizing iris scissors.
Staged Advancement Flap Text: The defect edges were debeveled with a #15 scalpel blade.  Given the location of the defect, shape of the defect and the proximity to free margins a staged advancement flap was deemed most appropriate.  Using a sterile surgical marker, an appropriate advancement flap was drawn incorporating the defect and placing the expected incisions within the relaxed skin tension lines where possible. The area thus outlined was incised deep to adipose tissue with a #15 scalpel blade.  The skin margins were undermined to an appropriate distance in all directions utilizing iris scissors.
Star Wedge Flap Text: The defect edges were debeveled with a #15 scalpel blade.  Given the location of the defect, shape of the defect and the proximity to free margins a star wedge flap was deemed most appropriate.  Using a sterile surgical marker, an appropriate rotation flap was drawn incorporating the defect and placing the expected incisions within the relaxed skin tension lines where possible. The area thus outlined was incised deep to adipose tissue with a #15 scalpel blade.  The skin margins were undermined to an appropriate distance in all directions utilizing iris scissors.
Transposition Flap Text: The defect edges were debeveled with a #15 scalpel blade.  Given the location of the defect and the proximity to free margins a transposition flap was deemed most appropriate.  Using a sterile surgical marker, an appropriate transposition flap was drawn incorporating the defect.    The area thus outlined was incised deep to adipose tissue with a #15 scalpel blade and carried over to the primary defect.  The skin margins were undermined to an appropriate distance in all directions utilizing iris scissors.
Muscle Hinge Flap Text: The defect edges were debeveled with a #15 scalpel blade.  Given the size, depth and location of the defect and the proximity to free margins a muscle hinge flap was deemed most appropriate.  Using a sterile surgical marker, an appropriate hinge flap was drawn incorporating the defect. The area thus outlined was incised with a #15 scalpel blade.  The skin margins were undermined to an appropriate distance in all directions utilizing iris scissors.
Mustarde Flap Text: The defect edges were debeveled with a #15 scalpel blade.  Given the size, depth and location of the defect and the proximity to free margins a Mustarde flap was deemed most appropriate.  Using a sterile surgical marker, an appropriate flap was drawn incorporating the defect. The area thus outlined was incised with a #15 scalpel blade and carried over to the primary defect.  The skin margins were undermined to an appropriate distance in all directions utilizing iris scissors.
Nasal Turnover Hinge Flap Text: The defect edges were debeveled with a #15 scalpel blade.  Given the size, depth, location of the defect and the defect being full thickness a nasal turnover hinge flap was deemed most appropriate.  Using a sterile surgical marker, an appropriate hinge flap was drawn incorporating the defect. The area thus outlined was incised with a #15 scalpel blade and carried over to the primary defect. The flap was designed to recreate the nasal mucosal lining and the alar rim. The skin margins were undermined to an appropriate distance in all directions utilizing iris scissors.
Nasalis-Muscle-Based Myocutaneous Island Pedicle Flap Text: Using a #15 blade, an incision was made around the donor flap to the level of the nasalis muscle. Wide lateral undermining was then performed in both the subcutaneous plane above the nasalis muscle, and in a submuscular plane just above periosteum. This allowed the formation of a free nasalis muscle axial pedicle (based on the angular artery) which was still attached to the actual cutaneous flap, increasing its mobility and vascular viability. Hemostasis was obtained with pinpoint electrocoagulation. The flap was mobilized into position and the pivotal anchor points positioned and stabilized with buried interrupted sutures. Subcutaneous and dermal tissues were closed in a multilayered fashion with sutures. Tissue redundancies were excised, and the epidermal edges were apposed without significant tension and sutured with sutures.
Orbicularis Oris Muscle Flap Text: The defect edges were debeveled with a #15 scalpel blade.  Given that the defect affected the competency of the oral sphincter an orbicularis oris muscle flap was deemed most appropriate to restore this competency and normal muscle function.  Using a sterile surgical marker, an appropriate flap was drawn incorporating the defect. The area thus outlined was incised with a #15 scalpel blade.
Melolabial Transposition Flap Text: The defect edges were debeveled with a #15 scalpel blade.  Given the location of the defect and the proximity to free margins a melolabial flap was deemed most appropriate.  Using a sterile surgical marker, an appropriate melolabial transposition flap was drawn incorporating the defect.    The area thus outlined was incised deep to adipose tissue with a #15 scalpel blade and carried over to the primary defect.  The skin margins were undermined to an appropriate distance in all directions utilizing iris scissors.
Rhombic Flap Text: The defect edges were debeveled with a #15 scalpel blade.  Given the location of the defect and the proximity to free margins a rhombic flap was deemed most appropriate.  Using a sterile surgical marker, an appropriate rhombic flap was drawn incorporating the defect.    The area thus outlined was incised deep to adipose tissue with a #15 scalpel blade and carried over to the primary defect.  The skin margins were undermined to an appropriate distance in all directions utilizing iris scissors.
Rhomboid Transposition Flap Text: The defect edges were debeveled with a #15 scalpel blade.  Given the location of the defect and the proximity to free margins a rhomboid transposition flap was deemed most appropriate.  Using a sterile surgical marker, an appropriate rhomboid flap was drawn incorporating the defect.    The area thus outlined was incised deep to adipose tissue with a #15 scalpel blade.  The skin margins were undermined to an appropriate distance in all directions utilizing iris scissors.
Bi-Rhombic Flap Text: The defect edges were debeveled with a #15 scalpel blade.  Given the location of the defect and the proximity to free margins a bi-rhombic flap was deemed most appropriate.  Using a sterile surgical marker, an appropriate rhombic flap was drawn incorporating the defect. The area thus outlined was incised deep to adipose tissue with a #15 scalpel blade.  The skin margins were undermined to an appropriate distance in all directions utilizing iris scissors.
Helical Rim Advancement Flap Text: The defect edges were debeveled with a #15 blade scalpel.  Given the location of the defect and the proximity to free margins (helical rim) a double helical rim advancement flap was deemed most appropriate.  Using a sterile surgical marker, the appropriate advancement flaps were drawn incorporating the defect and placing the expected incisions between the helical rim and antihelix where possible.  The area thus outlined was incised through and through with a #15 scalpel blade and carried over to the primary defect.  With a skin hook and iris scissors, the flaps were gently and sharply undermined and freed up.
Bilateral Helical Rim Advancement Flap Text: The defect edges were debeveled with a #15 blade scalpel.  Given the location of the defect and the proximity to free margins (helical rim) a bilateral helical rim advancement flap was deemed most appropriate.  Using a sterile surgical marker, the appropriate advancement flaps were drawn incorporating the defect and placing the expected incisions between the helical rim and antihelix where possible.  The area thus outlined was incised through and through with a #15 scalpel blade.  With a skin hook and iris scissors, the flaps were gently and sharply undermined and freed up.
Ear Star Wedge Flap Text: The defect edges were debeveled with a #15 blade scalpel.  Given the location of the defect and the proximity to free margins (helical rim) an ear star wedge flap was deemed most appropriate.  Using a sterile surgical marker, the appropriate flap was drawn incorporating the defect and placing the expected incisions between the helical rim and antihelix where possible.  The area thus outlined was incised through and through with a #15 scalpel blade.
Banner Transposition Flap Text: The defect edges were debeveled with a #15 scalpel blade.  Given the location of the defect and the proximity to free margins a Banner transposition flap was deemed most appropriate.  Using a sterile surgical marker, an appropriate flap drawn around the defect. The area thus outlined was incised deep to adipose tissue with a #15 scalpel blade.  The skin margins were undermined to an appropriate distance in all directions utilizing iris scissors.
Bilobed Flap Text: The defect edges were debeveled with a #15 scalpel blade.  Given the location of the defect and the proximity to free margins a bilobe flap was deemed most appropriate.  Using a sterile surgical marker, an appropriate bilobed flap drawn around the defect.    The area thus outlined was incised deep to adipose tissue with a #15 scalpel blade and carried over to the primary defect.  The skin margins were undermined to an appropriate distance in all directions utilizing iris scissors.
Bilobed Transposition Flap Text: The defect edges were debeveled with a #15 scalpel blade.  Given the location of the defect and the proximity to free margins a bilobed transposition flap was deemed most appropriate.  Using a sterile surgical marker, an appropriate bilobe flap drawn around the defect.    The area thus outlined was incised deep to adipose tissue with a #15 scalpel blade and carried over to the primary defect.  The skin margins were undermined to an appropriate distance in all directions utilizing iris scissors.
Trilobed Flap Text: The defect edges were debeveled with a #15 scalpel blade.  Given the location of the defect and the proximity to free margins a trilobed flap was deemed most appropriate.  Using a sterile surgical marker, an appropriate trilobed flap drawn around the defect.    The area thus outlined was incised deep to adipose tissue with a #15 scalpel blade and carried over to the primary defect.  The skin margins were undermined to an appropriate distance in all directions utilizing iris scissors.
Dorsal Nasal Flap Text: The defect edges were debeveled with a #15 scalpel blade.  Given the location of the defect and the proximity to free margins a dorsal nasal flap was deemed most appropriate.  Using a sterile surgical marker, an appropriate dorsal nasal flap was drawn around the defect.    The area thus outlined was incised deep to adipose tissue with a #15 scalpel blade and carried over to the primary defect.  The skin margins were undermined to an appropriate distance in all directions utilizing iris scissors.
Island Pedicle Flap Text: The defect edges were debeveled with a #15 scalpel blade.  Given the location of the defect, shape of the defect and the proximity to free margins an island pedicle advancement flap was deemed most appropriate.  Using a sterile surgical marker, an appropriate advancement flap was drawn incorporating the defect, outlining the appropriate donor tissue and placing the expected incisions within the relaxed skin tension lines where possible.    The area thus outlined was incised deep to adipose tissue with a #15 scalpel blade and carried over to the primary defect.  The skin margins were undermined to an appropriate distance in all directions around the primary defect and laterally outward around the island pedicle utilizing iris scissors.  There was minimal undermining beneath the pedicle flap.
Island Pedicle Flap With Canthal Suspension Text: The defect edges were debeveled with a #15 scalpel blade.  Given the location of the defect, shape of the defect and the proximity to free margins an island pedicle advancement flap was deemed most appropriate.  Using a sterile surgical marker, an appropriate advancement flap was drawn incorporating the defect, outlining the appropriate donor tissue and placing the expected incisions within the relaxed skin tension lines where possible. The area thus outlined was incised deep to adipose tissue with a #15 scalpel blade.  The skin margins were undermined to an appropriate distance in all directions around the primary defect and laterally outward around the island pedicle utilizing iris scissors.  There was minimal undermining beneath the pedicle flap. A suspension suture was placed in the canthal tendon to prevent tension and prevent ectropion.
Alar Island Pedicle Flap Text: The defect edges were debeveled with a #15 scalpel blade.  Given the location of the defect, shape of the defect and the proximity to the alar rim an island pedicle advancement flap was deemed most appropriate.  Using a sterile surgical marker, an appropriate advancement flap was drawn incorporating the defect, outlining the appropriate donor tissue and placing the expected incisions within the nasal ala running parallel to the alar rim. The area thus outlined was incised with a #15 scalpel blade and carried over to the primary defect.  The skin margins were undermined minimally to an appropriate distance in all directions around the primary defect and laterally outward around the island pedicle utilizing iris scissors.  There was minimal undermining beneath the pedicle flap.
Double Island Pedicle Flap Text: The defect edges were debeveled with a #15 scalpel blade.  Given the location of the defect, shape of the defect and the proximity to free margins a double island pedicle advancement flap was deemed most appropriate.  Using a sterile surgical marker, an appropriate advancement flap was drawn incorporating the defect, outlining the appropriate donor tissue and placing the expected incisions within the relaxed skin tension lines where possible.    The area thus outlined was incised deep to adipose tissue with a #15 scalpel blade and carried over to the primary defect.  The skin margins were undermined to an appropriate distance in all directions around the primary defect and laterally outward around the island pedicle utilizing iris scissors.  There was minimal undermining beneath the pedicle flap.
Island Pedicle Flap-Requiring Vessel Identification Text: The defect edges were debeveled with a #15 scalpel blade.  Given the location of the defect, shape of the defect and the proximity to free margins an island pedicle advancement flap was deemed most appropriate.  Using a sterile surgical marker, an appropriate advancement flap was drawn, based on the axial vessel mentioned above, incorporating the defect, outlining the appropriate donor tissue and placing the expected incisions within the relaxed skin tension lines where possible.    The area thus outlined was incised deep to adipose tissue with a #15 scalpel blade.  The skin margins were undermined to an appropriate distance in all directions around the primary defect and laterally outward around the island pedicle utilizing iris scissors.  There was minimal undermining beneath the pedicle flap.
Keystone Flap Text: The defect edges were debeveled with a #15 scalpel blade.  Given the location of the defect, shape of the defect a keystone flap was deemed most appropriate.  Using a sterile surgical marker, an appropriate keystone flap was drawn incorporating the defect, outlining the appropriate donor tissue and placing the expected incisions within the relaxed skin tension lines where possible. The area thus outlined was incised deep to adipose tissue with a #15 scalpel blade and carried over to the primary defect.  The skin margins were undermined to an appropriate distance in all directions around the primary defect and laterally outward around the flap utilizing iris scissors.
O-T Plasty Text: The defect edges were debeveled with a #15 scalpel blade.  Given the location of the defect, shape of the defect and the proximity to free margins an O-T plasty was deemed most appropriate.  Using a sterile surgical marker, an appropriate O-T plasty was drawn incorporating the defect and placing the expected incisions within the relaxed skin tension lines where possible.    The area thus outlined was incised deep to adipose tissue with a #15 scalpel blade.  The skin margins were undermined to an appropriate distance in all directions utilizing iris scissors.
O-Z Plasty Text: The defect edges were debeveled with a #15 scalpel blade.  Given the location of the defect, shape of the defect and the proximity to free margins an O-Z plasty (double transposition flap) was deemed most appropriate.  Using a sterile surgical marker, the appropriate transposition flaps were drawn incorporating the defect and placing the expected incisions within the relaxed skin tension lines where possible.    The area thus outlined was incised deep to adipose tissue with a #15 scalpel blade.  The skin margins were undermined to an appropriate distance in all directions utilizing iris scissors.  Hemostasis was achieved with electrocautery.  The flaps were then transposed into place, one clockwise and the other counterclockwise, and anchored with interrupted buried subcutaneous sutures.
Double O-Z Plasty Text: The defect edges were debeveled with a #15 scalpel blade.  Given the location of the defect, shape of the defect and the proximity to free margins a Double O-Z plasty (double transposition flap) was deemed most appropriate.  Using a sterile surgical marker, the appropriate transposition flaps were drawn incorporating the defect and placing the expected incisions within the relaxed skin tension lines where possible. The area thus outlined was incised deep to adipose tissue with a #15 scalpel blade.  The skin margins were undermined to an appropriate distance in all directions utilizing iris scissors.  Hemostasis was achieved with electrocautery.  The flaps were then transposed into place, one clockwise and the other counterclockwise, and anchored with interrupted buried subcutaneous sutures.
V-Y Plasty Text: The defect edges were debeveled with a #15 scalpel blade.  Given the location of the defect, shape of the defect and the proximity to free margins an V-Y advancement flap was deemed most appropriate.  Using a sterile surgical marker, an appropriate advancement flap was drawn incorporating the defect and placing the expected incisions within the relaxed skin tension lines where possible.    The area thus outlined was incised deep to adipose tissue with a #15 scalpel blade.  The skin margins were undermined to an appropriate distance in all directions utilizing iris scissors.
H Plasty Text: Given the location of the defect, shape of the defect and the proximity to free margins a H-plasty was deemed most appropriate for repair.  Using a sterile surgical marker, the appropriate advancement arms of the H-plasty were drawn incorporating the defect and placing the expected incisions within the relaxed skin tension lines where possible. The area thus outlined was incised deep to adipose tissue with a #15 scalpel blade. The skin margins were undermined to an appropriate distance in all directions utilizing iris scissors.  The opposing advancement arms were then advanced into place in opposite direction and anchored with interrupted buried subcutaneous sutures.
W Plasty Text: The lesion was extirpated to the level of the fat with a #15 scalpel blade.  Given the location of the defect, shape of the defect and the proximity to free margins a W-plasty was deemed most appropriate for repair.  Using a sterile surgical marker, the appropriate transposition arms of the W-plasty were drawn incorporating the defect and placing the expected incisions within the relaxed skin tension lines where possible.    The area thus outlined was incised deep to adipose tissue with a #15 scalpel blade.  The skin margins were undermined to an appropriate distance in all directions utilizing iris scissors.  The opposing transposition arms were then transposed into place in opposite direction and anchored with interrupted buried subcutaneous sutures.
Z Plasty Text: The lesion was extirpated to the level of the fat with a #15 scalpel blade.  Given the location of the defect, shape of the defect and the proximity to free margins a Z-plasty was deemed most appropriate for repair.  Using a sterile surgical marker, the appropriate transposition arms of the Z-plasty were drawn incorporating the defect and placing the expected incisions within the relaxed skin tension lines where possible.    The area thus outlined was incised deep to adipose tissue with a #15 scalpel blade.  The skin margins were undermined to an appropriate distance in all directions utilizing iris scissors.  The opposing transposition arms were then transposed into place in opposite direction and anchored with interrupted buried subcutaneous sutures.
Double Z Plasty Text: The lesion was extirpated to the level of the fat with a #15 scalpel blade. Given the location of the defect, shape of the defect and the proximity to free margins a double Z-plasty was deemed most appropriate for repair. Using a sterile surgical marker, the appropriate transposition arms of the double Z-plasty were drawn incorporating the defect and placing the expected incisions within the relaxed skin tension lines where possible. The area thus outlined was incised deep to adipose tissue with a #15 scalpel blade. The skin margins were undermined to an appropriate distance in all directions utilizing iris scissors. The opposing transposition arms were then transposed and carried over into place in opposite direction and anchored with interrupted buried subcutaneous sutures.
Zygomaticofacial Flap Text: Given the location of the defect, shape of the defect and the proximity to free margins a zygomaticofacial flap was deemed most appropriate for repair.  Using a sterile surgical marker, the appropriate flap was drawn incorporating the defect and placing the expected incisions within the relaxed skin tension lines where possible. The area thus outlined was incised deep to adipose tissue with a #15 scalpel blade with preservation of a vascular pedicle.  The skin margins were undermined to an appropriate distance in all directions utilizing iris scissors.  The flap was then placed into the defect and anchored with interrupted buried subcutaneous sutures.
Cheek Interpolation Flap Text: A decision was made to reconstruct the defect utilizing an interpolation axial flap and a staged reconstruction.  A telfa template was made of the defect.  This telfa template was then used to outline the Cheek Interpolation flap.  The donor area for the pedicle flap was then injected with anesthesia.  The flap was excised through the skin and subcutaneous tissue down to the layer of the underlying musculature.  The interpolation flap was carefully excised within this deep plane to maintain its blood supply.  The edges of the donor site were undermined.   The donor site was closed in a primary fashion.  The pedicle was then carried over to the primary defect, rotated into position and sutured.  Once the tube was sutured into place, adequate blood supply was confirmed with blanching and refill.  The pedicle was then wrapped with xeroform gauze and dressed appropriately with a telfa and gauze bandage to ensure continued blood supply and protect the attached pedicle.
Cheek-To-Nose Interpolation Flap Text: A decision was made to reconstruct the defect utilizing an interpolation axial flap and a staged reconstruction.  A telfa template was made of the defect.  This telfa template was then used to outline the Cheek-To-Nose Interpolation flap.  The donor area for the pedicle flap was then injected with anesthesia.  The flap was excised through the skin and subcutaneous tissue down to the layer of the underlying musculature.  The interpolation flap was carefully excised within this deep plane to maintain its blood supply.  The edges of the donor site were undermined.   The donor site was closed in a primary fashion.  The pedicle was then and carried over to the primary defect, rotated into position and sutured.  Once the tube was sutured into place, adequate blood supply was confirmed with blanching and refill.  The pedicle was then wrapped with xeroform gauze and dressed appropriately with a telfa and gauze bandage to ensure continued blood supply and protect the attached pedicle.
Interpolation Flap Text: A decision was made to reconstruct the defect utilizing an interpolation axial flap and a staged reconstruction.  A telfa template was made of the defect.  This telfa template was then used to outline the interpolation flap.  The donor area for the pedicle flap was then injected with anesthesia.  The flap was excised through the skin and subcutaneous tissue down to the layer of the underlying musculature.  The interpolation flap was carefully excised within this deep plane to maintain its blood supply.  The edges of the donor site were undermined.   The donor site was closed in a primary fashion.  The pedicle was then and carried over to the primary defect, rotated into position and sutured.  Once the tube was sutured into place, adequate blood supply was confirmed with blanching and refill.  The pedicle was then wrapped with xeroform gauze and dressed appropriately with a telfa and gauze bandage to ensure continued blood supply and protect the attached pedicle.
Melolabial Interpolation Flap Text: A decision was made to reconstruct the defect utilizing an interpolation axial flap and a staged reconstruction.  A telfa template was made of the defect.  This telfa template was then used to outline the melolabial interpolation flap.  The donor area for the pedicle flap was then injected with anesthesia.  The flap was excised through the skin and subcutaneous tissue down to the layer of the underlying musculature.  The pedicle flap was carefully excised within this deep plane to maintain its blood supply.  The edges of the donor site were undermined.   The donor site was closed in a primary fashion.  The pedicle was then carried over to the primary defect, rotated into position and sutured.  Once the tube was sutured into place, adequate blood supply was confirmed with blanching and refill.  The pedicle was then wrapped with xeroform gauze and dressed appropriately with a telfa and gauze bandage to ensure continued blood supply and protect the attached pedicle.
Mastoid Interpolation Flap Text: A decision was made to reconstruct the defect utilizing an interpolation axial flap and a staged reconstruction.  A telfa template was made of the defect.  This telfa template was then used to outline the mastoid interpolation flap.  The donor area for the pedicle flap was then injected with anesthesia.  The flap was excised through the skin and subcutaneous tissue down to the layer of the underlying musculature.  The pedicle flap was carefully excised within this deep plane to maintain its blood supply.  The edges of the donor site were undermined.   The donor site was closed in a primary fashion.  The pedicle was then and carried over to the primary defect, rotated into position and sutured.  Once the tube was sutured into place, adequate blood supply was confirmed with blanching and refill.  The pedicle was then wrapped with xeroform gauze and dressed appropriately with a telfa and gauze bandage to ensure continued blood supply and protect the attached pedicle.
Posterior Auricular Interpolation Flap Text: A decision was made to reconstruct the defect utilizing an interpolation axial flap and a staged reconstruction.  A telfa template was made of the defect.  This telfa template was then used to outline the posterior auricular interpolation flap.  The donor area for the pedicle flap was then injected with anesthesia.  The flap was excised through the skin and subcutaneous tissue down to the layer of the underlying musculature.  The pedicle flap was carefully excised within this deep plane to maintain its blood supply.  The edges of the donor site were undermined.   The donor site was closed in a primary fashion.  The pedicle was then rotated into position and sutured.  Once the tube was sutured into place, adequate blood supply was confirmed with blanching and refill.  The pedicle was then wrapped with xeroform gauze and dressed appropriately with a telfa and gauze bandage to ensure continued blood supply and protect the attached pedicle.
Paramedian Forehead Flap Text: A decision was made to reconstruct the defect utilizing an interpolation axial flap and a staged reconstruction.  A telfa template was made of the defect.  This telfa template was then used to outline the paramedian forehead pedicle flap.  The donor area for the pedicle flap was then injected with anesthesia.  The flap was excised through the skin and subcutaneous tissue down to the layer of the underlying musculature.  The pedicle flap was carefully excised within this deep plane to maintain its blood supply.  The edges of the donor site were undermined.   The donor site was closed in a primary fashion.  The pedicle was then rotated into position and sutured.  Once the tube was sutured into place, adequate blood supply was confirmed with blanching and refill.  The pedicle was then wrapped with xeroform gauze and dressed appropriately with a telfa and gauze bandage to ensure continued blood supply and protect the attached pedicle.
Abbe Flap (Upper To Lower Lip) Text: The defect of the lower lip was assessed and measured.  Given the location and size of the defect, an Abbe flap was deemed most appropriate.  Using a sterile surgical marker, an appropriate Abbe flap was measured and drawn on the upper lip. Local anesthesia was then infiltrated.  A scalpel was then used to incise the upper lip through and through the skin, vermilion, muscle and mucosa, leaving the flap pedicled on the opposite side.  The flap was then rotated and transferred to the lower lip defect.  The flap was then sutured into place with a three layer technique, closing the orbicularis oris muscle layer with subcutaneous buried sutures, followed by a mucosal layer and an epidermal layer.
Abbe Flap (Lower To Upper Lip) Text: The defect of the upper lip was assessed and measured.  Given the location and size of the defect, an Abbe flap was deemed most appropriate.  Using a sterile surgical marker, an appropriate Abbe flap was measured and drawn on the lower lip. Local anesthesia was then infiltrated. A scalpel was then used to incise the upper lip through and through the skin, vermilion, muscle and mucosa, leaving the flap pedicled on the opposite side.  The flap was then rotated and transferred to the lower lip defect.  The flap was then sutured into place with a three layer technique, closing the orbicularis oris muscle layer with subcutaneous buried sutures, followed by a mucosal layer and an epidermal layer.
Estlander Flap (Upper To Lower Lip) Text: The defect of the lower lip was assessed and measured.  Given the location and size of the defect, an Estlander flap was deemed most appropriate.  Using a sterile surgical marker, an appropriate Estlander flap was measured and drawn on the upper lip. Local anesthesia was then infiltrated. A scalpel was then used to incise the lateral aspect of the flap, through skin, muscle and mucosa, leaving the flap pedicled medially.  The flap was then rotated and positioned to fill the lower lip defect.  The flap was then sutured into place with a three layer technique, closing the orbicularis oris muscle layer with subcutaneous buried sutures, followed by a mucosal layer and an epidermal layer.
Cheiloplasty (Less Than 50%) Text: A decision was made to reconstruct the defect with a  cheiloplasty.  The defect was undermined extensively.  Additional obicularis oris muscle was excised with a 15 blade scalpel.  The defect was converted into a full thickness wedge, of less than 50% of the vertical height of the lip, to facilite a better cosmetic result.  Small vessels were then tied off with 5-0 monocyrl. The obicularis oris, superficial fascia, adipose and dermis were then reapproximated.  After the deeper layers were approximated the epidermis was reapproximated with particular care given to realign the vermilion border.
Cheiloplasty (Complex) Text: A decision was made to reconstruct the defect with a  cheiloplasty.  The defect was undermined extensively.  Additional obicularis oris muscle was excised with a 15 blade scalpel.  The defect was converted into a full thickness wedge to facilite a better cosmetic result.  Small vessels were then tied off with 5-0 monocyrl. The obicularis oris, superficial fascia, adipose and dermis were then reapproximated.  After the deeper layers were approximated the epidermis was reapproximated with particular care given to realign the vermilion border.
Ear Wedge Repair Text: A wedge excision was completed by carrying down an excision through the full thickness of the ear and cartilage with an inward facing Burow's triangle. The wound was then closed in a layered fashion.
Full Thickness Lip Wedge Repair (Flap) Text: Given the location of the defect and the proximity to free margins a full thickness wedge repair was deemed most appropriate.  Using a sterile surgical marker, the appropriate repair was drawn incorporating the defect and placing the expected incisions perpendicular to the vermilion border.  The vermilion border was also meticulously outlined to ensure appropriate reapproximation during the repair.  The area thus outlined was incised through and through with a #15 scalpel blade.  The muscularis and dermis were reaproximated with deep sutures following hemostasis. Care was taken to realign the vermilion border before proceeding with the superficial closure.  Once the vermilion was realigned the superfical and mucosal closure was finished.
Ftsg Text: The defect edges were debeveled with a #15 scalpel blade.  Given the location of the defect, shape of the defect and the proximity to free margins a full thickness skin graft was deemed most appropriate.  Using a sterile surgical marker, the primary defect shape was transferred to the donor site. The area thus outlined was incised deep to adipose tissue with a #15 scalpel blade.  The harvested graft was then trimmed of adipose tissue until only dermis and epidermis was left.  The skin margins of the secondary defect were undermined to an appropriate distance in all directions utilizing iris scissors.  The secondary defect was closed with interrupted buried subcutaneous sutures.  The skin edges were then re-apposed with running  sutures.  The skin graft was then placed in the primary defect and oriented appropriately.
Split-Thickness Skin Graft Text: The defect edges were debeveled with a #15 scalpel blade.  Given the location of the defect, shape of the defect and the proximity to free margins a split thickness skin graft was deemed most appropriate.  Using a sterile surgical marker, the primary defect shape was transferred to the donor site. The split thickness graft was then harvested.  The skin graft was then placed in the primary defect and oriented appropriately.
Pinch Graft Text: The defect edges were debeveled with a #15 scalpel blade. Given the location of the defect, shape of the defect and the proximity to free margins a pinch graft was deemed most appropriate. Using a sterile surgical marker, the primary defect shape was transferred to the donor site. The area thus outlined was incised deep to adipose tissue with a #15 scalpel blade.  The harvested graft was then trimmed of adipose tissue until only dermis and epidermis was left. The skin margins of the secondary defect were undermined to an appropriate distance in all directions utilizing iris scissors.  The secondary defect was closed with interrupted buried subcutaneous sutures.  The skin edges were then re-apposed with running  sutures.  The skin graft was then placed in the primary defect and oriented appropriately.
Burow's Graft Text: The defect edges were debeveled with a #15 scalpel blade.  Given the location of the defect, shape of the defect, the proximity to free margins and the presence of a standing cone deformity a Burow's skin graft was deemed most appropriate. The standing cone was removed and this tissue was then trimmed to the shape of the primary defect. The adipose tissue was also removed until only dermis and epidermis were left.  The skin margins of the secondary defect were undermined to an appropriate distance in all directions utilizing iris scissors.  The secondary defect was closed with interrupted buried subcutaneous sutures.  The skin edges were then re-apposed with running  sutures.  The skin graft was then placed in the primary defect and oriented appropriately.
Cartilage Graft Text: The defect edges were debeveled with a #15 scalpel blade.  Given the location of the defect, shape of the defect, the fact the defect involved a full thickness cartilage defect a cartilage graft was deemed most appropriate.  An appropriate donor site was identified, cleansed, and anesthetized. The cartilage graft was then harvested and transferred to the recipient site, oriented appropriately and then sutured into place.  The secondary defect was then repaired using a primary closure.
Composite Graft Text: The defect edges were debeveled with a #15 scalpel blade.  Given the location of the defect, shape of the defect, the proximity to free margins and the fact the defect was full thickness a composite graft was deemed most appropriate.  The defect was outline and then transferred to the donor site.  A full thickness graft was then excised from the donor site. The graft was then placed in the primary defect, oriented appropriately and then sutured into place.  The secondary defect was then repaired using a primary closure.
Epidermal Autograft Text: The defect edges were debeveled with a #15 scalpel blade.  Given the location of the defect, shape of the defect and the proximity to free margins an epidermal autograft was deemed most appropriate.  Using a sterile surgical marker, the primary defect shape was transferred to the donor site. The epidermal graft was then harvested.  The skin graft was then placed in the primary defect and oriented appropriately.
Dermal Autograft Text: The defect edges were debeveled with a #15 scalpel blade.  Given the location of the defect, shape of the defect and the proximity to free margins a dermal autograft was deemed most appropriate.  Using a sterile surgical marker, the primary defect shape was transferred to the donor site. The area thus outlined was incised deep to adipose tissue with a #15 scalpel blade.  The harvested graft was then trimmed of adipose and epidermal tissue until only dermis was left.  The skin graft was then placed in the primary defect and oriented appropriately.
Skin Substitute Text: The defect edges were debeveled with a #15 scalpel blade.  Given the location of the defect, shape of the defect and the proximity to free margins a skin substitute graft was deemed most appropriate.  The graft material was trimmed to fit the size of the defect. The graft was then placed in the primary defect and oriented appropriately.
Tissue Cultured Epidermal Autograft Text: The defect edges were debeveled with a #15 scalpel blade.  Given the location of the defect, shape of the defect and the proximity to free margins a tissue cultured epidermal autograft was deemed most appropriate.  The graft was then trimmed to fit the size of the defect.  The graft was then placed in the primary defect and oriented appropriately.
Xenograft Text: The defect edges were debeveled with a #15 scalpel blade.  Given the location of the defect, shape of the defect and the proximity to free margins a xenograft was deemed most appropriate.  The graft was then trimmed to fit the size of the defect.  The graft was then placed in the primary defect and oriented appropriately.
Purse String (Simple) Text: Given the location of the defect and the characteristics of the surrounding skin a purse string closure was deemed most appropriate.  Undermining was performed circumfirentially around the surgical defect.  A purse string suture was then placed and tightened.
Purse String (Intermediate) Text: Given the location of the defect and the characteristics of the surrounding skin a purse string intermediate closure was deemed most appropriate.  Undermining was performed circumfirentially around the surgical defect.  A purse string suture was then placed and tightened.
Partial Purse String (Simple) Text: Given the location of the defect and the characteristics of the surrounding skin a simple purse string closure was deemed most appropriate.  Undermining was performed circumfirentially around the surgical defect.  A purse string suture was then placed and tightened. Wound tension only allowed a partial closure of the circular defect.
Partial Purse String (Intermediate) Text: Given the location of the defect and the characteristics of the surrounding skin an intermediate purse string closure was deemed most appropriate.  Undermining was performed circumfirentially around the surgical defect.  A purse string suture was then placed and tightened. Wound tension only allowed a partial closure of the circular defect.
Localized Dermabrasion With Wire Brush Text: The patient was draped in routine manner.  Localized dermabrasion using 3 x 17 mm wire brush was performed in routine manner to papillary dermis. This spot dermabrasion is being performed to complete skin cancer reconstruction. It also will eliminate the other sun damaged precancerous cells that are known to be part of the regional effect of a lifetime's worth of sun exposure. This localized dermabrasion is therapeutic and should not be considered cosmetic in any regard.
Tarsorrhaphy Text: A tarsorrhaphy was performed using Frost sutures.
Intermediate Repair And Flap Additional Text (Will Appearing After The Standard Complex Repair Text): The intermediate repair was not sufficient to completely close the primary defect. The remaining additional defect was repaired with the flap mentioned below.
Intermediate Repair And Graft Additional Text (Will Appearing After The Standard Complex Repair Text): The intermediate repair was not sufficient to completely close the primary defect. The remaining additional defect was repaired with the graft mentioned below.
Complex Repair And Flap Additional Text (Will Appearing After The Standard Complex Repair Text): The complex repair was not sufficient to completely close the primary defect. The remaining additional defect was repaired with the flap mentioned below.
Complex Repair And Graft Additional Text (Will Appearing After The Standard Complex Repair Text): The complex repair was not sufficient to completely close the primary defect. The remaining additional defect was repaired with the graft mentioned below.
Manual Repair Warning Statement: We plan on removing the manually selected variable below in favor of our much easier automatic structured text blocks found in the previous tab. We decided to do this to help make the flow better and give you the full power of structured data. Manual selection is never going to be ideal in our platform and I would encourage you to avoid using manual selection from this point on, especially since I will be sunsetting this feature. It is important that you do one of two things with the customized text below. First, you can save all of the text in a word file so you can have it for future reference. Second, transfer the text to the appropriate area in the Library tab. Lastly, if there is a flap or graft type which we do not have you need to let us know right away so I can add it in before the variable is hidden. No need to panic, we plan to give you roughly 6 months to make the change.
Same Histology In Subsequent Stages Text: The pattern and morphology of the tumor is as described in the first stage.
No Residual Tumor Seen Histology Text: Normal epidermis, dermis, and subcutaneous and/or deeper tissue (where applicable) were noted on frozen sections during Mohs Surgery. There were no malignant cells seen in the sections examined.
Inflammation Suggestive Of Cancer Camouflage Histology Text: There was a dense lymphocytic infiltrate which prevented adequate histologic evaluation of adjacent structures.
Incidental Squamous Cell Carcinoma In Situ Histology Text: Incidental squamous cell carcinoma in situ was noted.  The epidermis shows acanthosis with elongation and thickening of the rete ridges.  There are varying degrees of atypia seen within the epidermal cells.  The anaplastic cells are enlarged with hyperchromatic nuclei.  Multiple nucleated epidermal cells are present and parakeratotic nuclei.  Multiple nucleated epidermal cells are present and parakeratotic cells are seen in the stratum corneum.  The anaplastic cells appear to be confined to the epidermis without penetration of the dermal-epidermal junction.
Incidental Actinic Keratosis Histology Text: Actinic keratosis was noted on frozen sections.  The specimen contains foci of disordered epidermal cells confined to the epidermis.  The cells have anaplastic nuclei with parakeratosis and a thickened granular cell layer.  There is papillomatosis and some hyperkeratosis.
Bcc Histology Text: The dermis contains distinctive tumor cell masses of various shapes and sizes composed of cells with large oval or elongated nuclei with relatively little cytoplasm.  The nuclei are homogenous.  There is no pronounced variation in size or intensity of staining and no significant anaplastic appearance.  The cells at the outer aspect of the tumor masses show palisading of nuclei.  Tumor masses are surrounded by a connective tissue stroma and in some areas there is retraction artifact of the tumor nodule away from the surrounding stroma.
Bcc Infiltrative Histology Text: The dermis contains distinctive tumor cell masses of various shapes and sizes composed of cells with large oval or elongated nuclei with relatively little cytoplasm.  The nuclei are homogenous.  There is no pronounced variation in size or intensity of staining and no significant anaplastic appearance.  The cells at the outer aspect of the tumor masses show palisading of nuclei.  Tumor masses are surrounded by a connective tissue stroma and in some areas there is retraction artifact of the tumor nodule away from the surrounding stroma.  The tumor nests are sharply angulated and demonstrating an infiltrating pattern extending deeply with surrounding fibrosis.
Bcc Infundibulocystic Histology Text: The dermis contains distinctive tumor cell masses of various shapes and sizes composed of cells with large oval or elongated nuclei with relatively little cytoplasm.  The nuclei are homogenous.  There is no pronounced variation in size or intensity of staining and no significant anaplastic appearance.  The cells at the outer aspect of the tumor masses show palisading of nuclei.  Tumor masses are surrounded by a connective tissue stroma and in some areas there is retraction artifact of the tumor nodule away from the surrounding stroma.  An infundibulocystic histology is noted.
Bcc Macronodular Histology Text: The dermis contains distinctive tumor cell masses of various shapes and sizes composed of cells with large oval or elongated nuclei with relatively little cytoplasm.  The nuclei are homogenous.  There is no pronounced variation in size or intensity of staining and no significant anaplastic appearance.  The cells at the outer aspect of the tumor masses show palisading of nuclei.  Tumor masses are surrounded by a connective tissue stroma and in some areas there is retraction artifact of the tumor nodule away from the surrounding stroma.  A macronodular histology is noted.
Bcc Micronodular Histology Text: The dermis contains distinctive tumor cell masses of various shapes and sizes composed of cells with large oval or elongated nuclei with relatively little cytoplasm.  The nuclei are homogenous.  There is no pronounced variation in size or intensity of staining and no significant anaplastic appearance.  The cells at the outer aspect of the tumor masses show palisading of nuclei.  Tumor masses are surrounded by a connective tissue stroma and in some areas there is retraction artifact of the tumor nodule away from the surrounding stroma.  A micronodular histology is noted.
Bcc  Morpheaform/Sclerosing Histology Text: The dermis contains distinctive tumor cell masses of various shapes and sizes composed of cells with large oval or elongated nuclei with relatively little cytoplasm.  The nuclei are homogenous.  There is no pronounced variation in size or intensity of staining and no significant anaplastic appearance.  The cells at the outer aspect of the tumor masses show palisading of nuclei.  Tumor masses are surrounded by a connective tissue stroma and in some areas there is retraction artifact of the tumor nodule away from the surrounding stroma.  The tumor nests are sharply angulated and demonstrating an infiltrating pattern extending deeply with surrounding fibrosis, consistent with morpheaform/sclerosing histology.
Bcc  Nodular Histology Text: The dermis contains distinctive tumor cell masses of various shapes and sizes composed of cells with large oval or elongated nuclei with relatively little cytoplasm.  The nuclei are homogenous.  There is no pronounced variation in size or intensity of staining and no significant anaplastic appearance.  The cells at the outer aspect of the tumor masses show palisading of nuclei.  Tumor masses are surrounded by a connective tissue stroma and in some areas there is retraction artifact of the tumor nodule away from the surrounding stroma.  A nodular histology is noted.
Bcc  Nodulocystic Histology Text: The dermis contains distinctive tumor cell masses of various shapes and sizes composed of cells with large oval or elongated nuclei with relatively little cytoplasm.  The nuclei are homogenous.  There is no pronounced variation in size or intensity of staining and no significant anaplastic appearance.  The cells at the outer aspect of the tumor masses show palisading of nuclei.  Tumor masses are surrounded by a connective tissue stroma and in some areas there is retraction artifact of the tumor nodule away from the surrounding stroma.  A nodulocystic histology is noted.
Bcc Pigmented Histology Text: The dermis contains distinctive tumor cell masses of various shapes and sizes composed of cells with large oval or elongated nuclei with relatively little cytoplasm.  The nuclei are homogenous.  There is no pronounced variation in size or intensity of staining and no significant anaplastic appearance.  The cells at the outer aspect of the tumor masses show palisading of nuclei.  Tumor masses are surrounded by a connective tissue stroma and in some areas there is retraction artifact of the tumor nodule away from the surrounding stroma.  Pigmented tumor cells are noted.
Bcc Superficial Histology Text: The dermis contains distinctive tumor cell masses of various shapes and sizes composed of cells with large oval or elongated nuclei with relatively little cytoplasm.  The nuclei are homogenous.  There is no pronounced variation in size or intensity of staining and no significant anaplastic appearance.  The cells at the outer aspect of the tumor masses show palisading of nuclei.  Tumor masses are surrounded by a connective tissue stroma and in some areas there is retraction artifact of the tumor nodule away from the surrounding stroma.  A superficial pattern is noted.
Bcc Superficial Pigmented Histology Text: The dermis contains distinctive tumor cell masses of various shapes and sizes composed of cells with large oval or elongated nuclei with relatively little cytoplasm.  The nuclei are homogenous.  There is no pronounced variation in size or intensity of staining and no significant anaplastic appearance.  The cells at the outer aspect of the tumor masses show palisading of nuclei.  Tumor masses are surrounded by a connective tissue stroma and in some areas there is retraction artifact of the tumor nodule away from the surrounding stroma.  A superficial pigmented pattern is noted.
Mixed Superficial And Nodular Bcc Histology Text: The dermis contains distinctive tumor cell masses of various shapes and sizes composed of cells with large oval or elongated nuclei with relatively little cytoplasm.  The nuclei are homogenous.  There is no pronounced variation in size or intensity of staining and no significant anaplastic appearance.  The cells at the outer aspect of the tumor masses show palisading of nuclei.  Tumor masses are surrounded by a connective tissue stroma and in some areas there is retraction artifact of the tumor nodule away from the surrounding stroma.  A mixed superficial and nodular pattern is noted.
Mixed Nodular And Infiltrative Bcc Histology Text: The dermis contains distinctive tumor cell masses of various shapes and sizes composed of cells with large oval or elongated nuclei with relatively little cytoplasm.  The nuclei are homogenous.  There is no pronounced variation in size or intensity of staining and no significant anaplastic appearance.  The cells at the outer aspect of the tumor masses show palisading of nuclei.  Tumor masses are surrounded by a connective tissue stroma and in some areas there is retraction artifact of the tumor nodule away from the surrounding stroma.  There is an admixture of nodular pattern BCC, as well as tumor nests that are sharply angulated and demonstrating an infiltrating pattern extending deeply with surrounding fibrosis.
Mixed Nodular And Micronodular Bcc Histology Text: The dermis contains distinctive tumor cell masses of various shapes and sizes composed of cells with large oval or elongated nuclei with relatively little cytoplasm.  The nuclei are homogenous.  There is no pronounced variation in size or intensity of staining and no significant anaplastic appearance.  The cells at the outer aspect of the tumor masses show palisading of nuclei.  Tumor masses are surrounded by a connective tissue stroma and in some areas there is retraction artifact of the tumor nodule away from the surrounding stroma.  A mixed nodular and micronodular pattern is noted.
Metatypical Bcc Histology Text: The dermis contains distinctive tumor cell masses of various shapes and sizes composed of cells with large oval or elongated nuclei with relatively little cytoplasm.  The nuclei are homogenous.  There is no pronounced variation in size or intensity of staining and no significant anaplastic appearance.  The cells at the outer aspect of the tumor masses show palisading of nuclei.  Tumor masses are surrounded by a connective tissue stroma and in some areas there is retraction artifact of the tumor nodule away from the surrounding stroma.  Squamous differentiation consistent with metatypical BCC is noted.
Scc Histology Text: There are irregular masses of epidermal cells in the upper dermis.  Within the tumor masses, there are varying proportions of normal squamous cells and anaplastic squamous cells.  The anaplastic cells have variation in size and shape with hyperplasia and hyperchromasia of the nuclei, absence of intracellular bridges and varying degrees of keratinization.
Scc Well Differentiated Histology Text: There are irregular masses of epidermal cells in the upper dermis.  Within the tumor masses, there are varying proportions of normal squamous cells and anaplastic squamous cells.  The anaplastic cells have variation in size and shape with hyperplasia and hyperchromasia of the nuclei, absence of intracellular bridges and varying degrees of keratinization.  A well differentiated pattern is noted.
Scc Moderately Differentiated Histology Text: There are irregular masses of epidermal cells in the upper dermis.  Within the tumor masses, there are varying proportions of normal squamous cells and anaplastic squamous cells.  The anaplastic cells have variation in size and shape with hyperplasia and hyperchromasia of the nuclei, absence of intracellular bridges and varying degrees of keratinization.  A moderately differentiated pattern is noted.
Scc Poorly Differentiated Histology Text: There are irregular masses of epidermal cells in the upper dermis.  Within the tumor masses, there are varying proportions of normal squamous cells and anaplastic squamous cells.  The anaplastic cells have variation in size and shape with hyperplasia and hyperchromasia of the nuclei, absence of intracellular bridges and varying degrees of keratinization.  A poorly differentiated pattern is noted.
Scc Acantholytic Histology Text: There are irregular masses of epidermal cells in the upper dermis.  Within the tumor masses, there are varying proportions of normal squamous cells and anaplastic squamous cells.  The anaplastic cells have variation in size and shape with hyperplasia and hyperchromasia of the nuclei, absence of intracellular bridges and varying degrees of keratinization.  An acantholytic pattern is noted.
Scc Ka Subtype Histology Text: On low power, a keratin filled invagination of the epidermis is noted to penetrate into the dermis.  There appear to be collections of epidermal cells with nuclear atypia in and around the base of the invagination.  These dyskeratotic cells show individual cells keratinization and appear eosinophilic.  A pronounced inflammatory cell infiltrate is noted in the surrounding dermis.  The surrounding epidermis extends as a buttress over the crater formed by the invagination.  At high magnification, the epidermal proliferations at the base of the crater show enlarged epidermal cells with hyperchromatic nuclei, consistent with SCC keratoacanthoma subtype.
Scc Spindle Histology Text: There are irregular masses of epidermal cells in the upper dermis.  Within the tumor masses, there are varying proportions of normal squamous cells and anaplastic squamous cells.  The anaplastic cells have variation in size and shape with hyperplasia and hyperchromasia of the nuclei, absence of intracellular bridges and varying degrees of keratinization.  A spindle cell pattern is noted.
Scc In Situ Histology Text: The epidermis shows acanthosis with elongation and thickening of the rete ridges.  There are varying degrees of atypia seen within the epidermal cells.  The anaplastic cells are enlarged with hyperchromatic nuclei.  Multiple nucleated epidermal cells are present and parakeratotic nuclei.  Multiple nucleated epidermal cells are present and parakeratotic cells are seen in the stratum corneum.  The anaplastic cells appear to be confined to the epidermis without penetration of the dermal- epidermal junction.
Lentigo Maligna Histology Text: Small clusters of uniformly round large melanocytes with atypical hyperchromatic nuclei and abundant cytoplasm are seen scattered throughout the lower levels of the epidermis.  Similar cells are seen singly in the upper layers of the epidermis.  There appears to be no penetration of these atypical melanocytes through the dermal epidermal junction and all cells appear to be confined to the epidermis.
Merkel Cell Carcinoma Histology Text: In the dermis and extending into the subcutaneous tissue, there are multiple anastomosing cords, clusters and sheets of basophilic cells.  These collections of cells do not appear to be connected to the epidermis.  Individual tumor cells have a very homogenous appearance.  They are closely spaced round ells that stain intensely blue with hematoxylin eosin.  These cells have vesiculare nuclei and scantly ill -defined cytoplasm.  Mitotic figures are seen.
Afx Histology Text: Mild to severe solar elastosis is noted.  Aggregates of dermal tumor cells  with moderate to severe pleomorphism with spindle, epithelioid, or multinucleated forms and atypical mitotic figures are seen.
Basosquamous Cell Carcinoma Histology Text: There are multiple islands of malignant cells throughout the dermis, exhibiting both basaloid and squamous differentiation. Some areas show BCC, with large and rounded basaloid cells, while others show SCC, with polygonal squamoid cells with abundant eosinophilic cytoplasm, large nuclei, and prominent nucleoli. The SCC component exhibited frequent atypical mitosis, cell and nuclear pleomorphism and hyperchromatism of the nuclei.
Dfsp Histology Text: In the dermis and subcutaneous fat there is a tumor mass consisting of cells with large spindle shaped nuclei embedded in varying amounts of collagen.  In some areas these cells are arranged in irregular bands while in others the intertwining bands result in a matte -like or a whorl -like fashion.  In some areas, the cells have enlarged nuclei with a moderate degree of atypia, and mitotic figures are seen.
Microcystic Adnexal Carcinoma Histology Text: Atypical epithelial cells are seen in the dermis arranged singly or in groups or organized as a cell-lined cystic cavity.  These cells are small in size with anaplastic appearing small nuclei.  When seen in the cystic configuration they resemble sweat ducts or glands.  In some areas the cells assume a spindle shape.  A slight stromal reaction is seen around the cells.
Sebaceous Carcinoma Histology Text: In the dermis, there are irregular lobules of varying size composed of undifferentiated sebaceous cells with foamy cytoplasm.  These cells, as well as their nuclei are of various sizes and shape.
Mart-1 - Positive Histology Text: MART-1 staining demonstrates areas of higher density and clustering of melanocytes with Pagetoid spread upwards within the epidermis. The surgical margins are positive for tumor cells.
Mart-1 - Negative Histology Text: MART-1 staining demonstrates a normal density and pattern of melanocytes along the dermal-epidermal junction. The surgical margins are negative for tumor cells.
Clia Id #: [id number]
Information: Selecting Yes will display possible errors in your note based on the variables you have selected. This validation is only offered as a suggestion for you. PLEASE NOTE THAT THE VALIDATION TEXT WILL BE REMOVED WHEN YOU FINALIZE YOUR NOTE. IF YOU WANT TO FAX A PRELIMINARY NOTE YOU WILL NEED TO TOGGLE THIS TO 'NO' IF YOU DO NOT WANT IT IN YOUR FAXED NOTE.

## 2023-12-25 DIAGNOSIS — D50.9 IRON DEFICIENCY ANEMIA, UNSPECIFIED IRON DEFICIENCY ANEMIA TYPE: ICD-10-CM

## 2023-12-25 DIAGNOSIS — R07.9 CHEST PAIN: ICD-10-CM

## 2023-12-25 RX ORDER — FUROSEMIDE 20 MG/1
TABLET ORAL
Qty: 180 TABLET | Refills: 0 | Status: SHIPPED | OUTPATIENT
Start: 2023-12-25

## 2023-12-25 RX ORDER — FERROUS SULFATE 324(65)MG
TABLET, DELAYED RELEASE (ENTERIC COATED) ORAL
Qty: 90 TABLET | Refills: 0 | Status: SHIPPED | OUTPATIENT
Start: 2023-12-25

## 2024-01-03 ENCOUNTER — TELEPHONE (OUTPATIENT)
Dept: FAMILY MEDICINE CLINIC | Facility: HOSPITAL | Age: 79
End: 2024-01-03

## 2024-01-03 NOTE — TELEPHONE ENCOUNTER
Patient has order for cxr to be done around 1/8/24.      Patient had PET scan at [facility] 12/20/23 (see results in care everywhere).    Do you still want him to get the cxr?    pcb

## 2024-01-15 ENCOUNTER — ANESTHESIA (OUTPATIENT)
Dept: GASTROENTEROLOGY | Facility: HOSPITAL | Age: 79
End: 2024-01-15

## 2024-01-15 ENCOUNTER — ANESTHESIA EVENT (OUTPATIENT)
Dept: GASTROENTEROLOGY | Facility: HOSPITAL | Age: 79
End: 2024-01-15

## 2024-01-15 ENCOUNTER — HOSPITAL ENCOUNTER (OUTPATIENT)
Dept: GASTROENTEROLOGY | Facility: HOSPITAL | Age: 79
Setting detail: OUTPATIENT SURGERY
Discharge: HOME | End: 2024-01-15
Attending: STUDENT IN AN ORGANIZED HEALTH CARE EDUCATION/TRAINING PROGRAM
Payer: COMMERCIAL

## 2024-01-15 VITALS
RESPIRATION RATE: 22 BRPM | HEART RATE: 60 BPM | HEIGHT: 70 IN | DIASTOLIC BLOOD PRESSURE: 65 MMHG | SYSTOLIC BLOOD PRESSURE: 121 MMHG | OXYGEN SATURATION: 95 % | WEIGHT: 150 LBS | BODY MASS INDEX: 21.47 KG/M2 | TEMPERATURE: 96.8 F

## 2024-01-15 DIAGNOSIS — R09.A2 GLOBUS SENSATION: Primary | ICD-10-CM

## 2024-01-15 DIAGNOSIS — R13.13 PHARYNGEAL DYSPHAGIA: ICD-10-CM

## 2024-01-15 LAB
GLUCOSE SERPL-MCNC: 110 MG/DL (ref 65–140)
GLUCOSE SERPL-MCNC: 90 MG/DL (ref 65–140)

## 2024-01-15 PROCEDURE — 43239 EGD BIOPSY SINGLE/MULTIPLE: CPT | Performed by: STUDENT IN AN ORGANIZED HEALTH CARE EDUCATION/TRAINING PROGRAM

## 2024-01-15 PROCEDURE — 88305 TISSUE EXAM BY PATHOLOGIST: CPT | Performed by: PATHOLOGY

## 2024-01-15 PROCEDURE — 82948 REAGENT STRIP/BLOOD GLUCOSE: CPT

## 2024-01-15 RX ORDER — SODIUM CHLORIDE 9 MG/ML
100 INJECTION, SOLUTION INTRAVENOUS CONTINUOUS
Status: CANCELLED | OUTPATIENT
Start: 2024-01-15

## 2024-01-15 RX ORDER — SODIUM CHLORIDE 9 MG/ML
INJECTION, SOLUTION INTRAVENOUS CONTINUOUS PRN
Status: DISCONTINUED | OUTPATIENT
Start: 2024-01-15 | End: 2024-01-15

## 2024-01-15 RX ORDER — PROPOFOL 10 MG/ML
INJECTION, EMULSION INTRAVENOUS AS NEEDED
Status: DISCONTINUED | OUTPATIENT
Start: 2024-01-15 | End: 2024-01-15

## 2024-01-15 RX ADMIN — PROPOFOL 50 MG: 10 INJECTION, EMULSION INTRAVENOUS at 08:29

## 2024-01-15 RX ADMIN — SODIUM CHLORIDE: 0.9 INJECTION, SOLUTION INTRAVENOUS at 08:15

## 2024-01-15 RX ADMIN — PROPOFOL 120 MG: 10 INJECTION, EMULSION INTRAVENOUS at 08:26

## 2024-01-15 NOTE — ANESTHESIA PREPROCEDURE EVALUATION
Procedure:  EGD    Relevant Problems   CARDIO   (+) Chest pain   (+) Chronic diastolic congestive heart failure (HCC)   (+) Coronary artery disease involving native heart with angina pectoris, unspecified vessel or lesion type (HCC)   (+) Essential hypertension   (+) Paroxysmal atrial fibrillation (HCC)      ENDO   (+) Type 2 diabetes mellitus without complication, without long-term current use of insulin (HCC)      GI/HEPATIC   (+) Fatty liver   (+) Gastroesophageal reflux disease with esophagitis   (+) Malignant tumor of cecum (HCC)   (+) Pharyngeal dysphagia      HEMATOLOGY   (+) Iron deficiency anemia   (+) Lymphoma (HCC)   (+) Multiple myeloma not having achieved remission (HCC)      MUSCULOSKELETAL   (+) Primary localized osteoarthritis of right knee      PULMONARY   (+) Multifocal pneumonia        Physical Exam    Airway    Mallampati score: II  TM Distance: >3 FB  Neck ROM: full     Dental    upper dentures and lower dentures,     Cardiovascular  Rhythm: regular, Rate: normal    Pulmonary   Breath sounds clear to auscultation    Other Findings  Intercisor Distance > 3cm          Anesthesia Plan  ASA Score- 3     Anesthesia Type- IV sedation with anesthesia with ASA Monitors.         Additional Monitors:     Airway Plan:     Comment: NPO appropriate. Discussed benefits/risks of monitored anesthetic care which involves providing a dynamic level of mild to deep sedation. Complications include awareness and/or airway obstruction/aspiration which may necessitate conversion to general anesthesia. All questions answered. Patient understands and wishes to proceed. .       Plan Factors-Exercise tolerance (METS): >4 METS.    Chart reviewed. EKG reviewed.  Existing labs reviewed.                   Induction-     Postoperative Plan- Plan for postoperative opioid use.     Informed Consent- Anesthetic plan and risks discussed with patient.  I personally reviewed this patient with the CRNA. Discussed and agreed on the  Anesthesia Plan with the CRNA..

## 2024-01-15 NOTE — H&P
"History and Physical -  [facility]  Gabe Pickard 78 y.o. male MRN: [id number]    HPI: Gabe Pickard is a 78 y.o. male who presents for EGD for follow up of dysphagia back in November.    REVIEW OF SYSTEMS: Per the HPI, and otherwise unremarkable.    Historical Information   Past Medical History:   Diagnosis Date    Acute respiratory failure with hypoxia (HCC) 04/20/2022    Arthritis     Cancer (HCC)     Cataract     Colitis     Colon polyp     Fatty liver     History of chemotherapy     History of radiation therapy     History of shingles 2018    History of transfusion     Hyperlipidemia     Hypertension      Past Surgical History:   Procedure Laterality Date    AORTIC VALVE REPLACEMENT      CARDIAC VALVE REPLACEMENT  2014    aorta    CATARACT EXTRACTION Bilateral     COLECTOMY      COLONOSCOPY  11/2019    Prior right hemicolectomy    CORONARY ARTERY BYPASS GRAFT      DENTAL SURGERY      JOINT REPLACEMENT  January 2019    left knee    KNEE SURGERY      LYMPH NODE BIOPSY  2003    LYMPHADENECTOMY      OTHER SURGICAL HISTORY      MAZE PROCEDURE    WI ARTHRP KNE CONDYLE&PLATU MEDIAL&LAT COMPARTMENTS Left 01/28/2019    Procedure: ARTHROPLASTY LEFT KNEE TOTAL;  Surgeon: Darell Delgado MD;  Location:  MAIN OR;  Service: Orthopedics    WI LARYNGOSCOPY DIRECT OPERATIVE W/BIOPSY Right 04/27/2022    Procedure: DIRECT LARYNGOSCOPY WITH BIOPSY RIGHT PHARYNX, POSSIBLE RIGHT NECK LYMPH NODE BIOPSY; FROZEN SECTION;  Surgeon: Kevin Hardin MD;  Location:  MAIN OR;  Service: ENT    SKIN BIOPSY      UPPER GASTROINTESTINAL ENDOSCOPY  11/2019    Schatzki ring    US GUIDED LYMPH NODE BIOPSY RIGHT  12/14/2021     Social History   Social History     Substance and Sexual Activity   Alcohol Use Yes    Alcohol/week: 2.0 standard drinks of alcohol    Types: 2 Cans of beer per week    Comment: very rare \"beer here and there\"     Social History     Substance and Sexual Activity   Drug Use No     Social History " "    Tobacco Use   Smoking Status Former    Current packs/day: 0.00    Average packs/day: 1 pack/day for 42.0 years (42.0 ttl pk-yrs)    Types: Cigarettes    Start date: 1967    Quit date: 1/1/2009    Years since quitting: 15.0   Smokeless Tobacco Never     Family History   Problem Relation Age of Onset    Heart block Family     Coronary artery disease Mother     Thrombosis Mother     Hypertension Mother     Arthritis Mother     Hyperlipidemia Mother     Diabetes Father     Hypertension Father     Arthritis Father     Sudden death Father         cardiac    Diabetes type II Father     Colon cancer Paternal Grandfather     Cancer Paternal Grandfather     Breast cancer Sister     Heart disease Brother         Coronary stents       Meds/Allergies       Current Outpatient Medications:     aspirin 81 MG tablet    atorvastatin (LIPITOR) 40 mg tablet    cholecalciferol (VITAMIN D3) 1,000 units tablet    dexamethasone (DECADRON) 4 mg tablet    ferrous sulfate 324 (65 Fe) mg    furosemide (LASIX) 20 mg tablet    IMMUNE GLOBULIN, HUMAN, IV    Jardiance 25 MG TABS    metoprolol succinate (TOPROL-XL) 100 mg 24 hr tablet    montelukast (SINGULAIR) 10 mg tablet    multivitamin-minerals (CENTRUM ADULTS) tablet    omeprazole (PriLOSEC) 40 MG capsule    valACYclovir (VALTREX) 500 mg tablet    fluorouracil (EFUDEX) 5 % cream    metFORMIN (GLUCOPHAGE-XR) 500 mg 24 hr tablet    Allergies   Allergen Reactions    Ceftin [Cefuroxime] Itching     However, has tolerated Cefazolin and Pip-Tazo since, which have different side chains. Be cautious with / avoid 2nd, 3rd, or 4th Gen Cephs that have similar side chains to Cefuroxime.    Lantus [Insulin Glargine] Itching       Objective     /71   Pulse 71   Temp (!) 96.8 °F (36 °C) (Temporal)   Resp 16   Ht 5' 10\" (1.778 m)   Wt 68 kg (150 lb)   SpO2 97%   BMI 21.52 kg/m²     PHYSICAL EXAM    Gen: NAD AAOx3  Head: Normocephalic, Atraumatic  CV: S1S2 RRR no m/r/g  CHEST: Clear b/l no " c/r/w  ABD: soft, +BS NT/ND  EXT: no edema    ASSESSMENT/PLAN:  This is a 78 y.o. year old male here for EGD with possible dilation, and he is stable and optimized for his procedure.

## 2024-01-15 NOTE — ANESTHESIA POSTPROCEDURE EVALUATION
Post-Op Assessment Note    CV Status:  Stable  Pain Score: 0    Pain management: adequate       Mental Status:  Sleepy   Hydration Status:  Euvolemic   PONV Controlled:  Controlled   Airway Patency:  Patent     Post Op Vitals Reviewed: Yes      Staff: Anesthesiologist, CRNA               BP   130/63   Temp      Pulse  68   Resp   18   SpO2   97

## 2024-01-17 PROBLEM — J18.9 MULTIFOCAL PNEUMONIA: Status: RESOLVED | Noted: 2023-03-23 | Resolved: 2024-01-17

## 2024-01-17 PROCEDURE — 88305 TISSUE EXAM BY PATHOLOGIST: CPT | Performed by: PATHOLOGY

## 2024-02-09 ENCOUNTER — TELEMEDICINE (OUTPATIENT)
Dept: FAMILY MEDICINE CLINIC | Facility: HOSPITAL | Age: 79
End: 2024-02-09
Payer: COMMERCIAL

## 2024-02-09 VITALS — TEMPERATURE: 99.5 F

## 2024-02-09 DIAGNOSIS — U07.1 COVID-19: Primary | ICD-10-CM

## 2024-02-09 DIAGNOSIS — E78.5 DYSLIPIDEMIA: Chronic | ICD-10-CM

## 2024-02-09 PROCEDURE — 99214 OFFICE O/P EST MOD 30 MIN: CPT | Performed by: INTERNAL MEDICINE

## 2024-02-09 RX ORDER — NIRMATRELVIR AND RITONAVIR 300-100 MG
3 KIT ORAL 2 TIMES DAILY
Qty: 30 TABLET | Refills: 0 | Status: SHIPPED | OUTPATIENT
Start: 2024-02-09 | End: 2024-02-14

## 2024-02-09 NOTE — PROGRESS NOTES
COVID-19 Outpatient Progress Note    Assessment/Plan:    Problem List Items Addressed This Visit          Other    Dyslipidemia (Chronic)     Advised to hold statin 5 days he is on the Paxlovid and restart statin when Paxlovid is completed          Other Visit Diagnoses       COVID-19    -  Primary    Relevant Medications    nirmatrelvir & ritonavir (Paxlovid, 300/100,) tablet therapy pack           Disposition:     Patient with asymptomatic/mild COVID-19: They were recommended to isolate for at least 5 days (day 0 is the day symptoms appeared or the date the specimen was collected for the positive test for people who are asymptomatic). If they are asymptomatic or symptoms are improving with no fevers in the past 24 hours, isolation may be ended followed by 5 days of wearing a high quality mask when around others to minimize risk of infecting others. They should wear a mask through day 10 and a test-based strategy may be used to remove a mask sooner.      Discussed symptom directed medication options with patient. Discussed vitamin D, vitamin C, and/or zinc supplementation with patient.     He was advised to self isolate and to avoid all public places/transportation/stores for 5+5=10 days. He was encouraged to wear a mask when around other household contacts and to use his own bathroom if at all possible. He was encouraged to wipe down surfaces/handles and to use and wash his own utensils. He was advised to check O2 sat at home and to call with O2 sat < 90% OR with any new/worse symptoms        Patient meets criteria for Paxlovid and they have been counseled appropriately regarding risks, benefits, side effects, and alternative treatment options. After discussion, patient agrees to treatment.    Possible side effects of Paxlovid?    Possible side effects of Paxlovid are:  - Liver Problems. Notify us right away if you start to experience loss of appetite, yellowing of your skin and the whites of eyes (jaundice),  dark-colored urine, pale colored stools and itchy skin, stomach area (abdominal) pain.  - Resistance to HIV Medicines. If you have untreated HIV infection, Paxlovid may lead to some HIV medicines not working as well in the future.  - Other possible side effects include: altered sense of taste, diarrhea, high blood pressure, or muscle aches.    I have spent a total time of 12 minutes on the day of the encounter for this patient including discussing prognosis, risks and benefits of treatment options, instructions for management, patient and family education, importance of treatment compliance, risk factor reductions, impressions, documenting in the medical record and obtaining or reviewing history. He is not getting chemo this week or next so no steroids.   Was advised to hold his statin the 5 days on the Paxlovid      Encounter provider: Radha Pettit DO     Provider located at: [facility] [address]   [address] [place] [id number]     Recent Visits  No visits were found meeting these conditions.  Showing recent visits within past 7 days and meeting all other requirements  Today's Visits  Date Type Provider Dept   02/09/24 Telemedicine Radha Pettit DO [facility] [phone]   Showing today's visits and meeting all other requirements  Future Appointments  No visits were found meeting these conditions.  Showing future appointments within next 150 days and meeting all other requirements     This virtual check-in was done via [employer] and patient was informed that this is a secure, HIPAA-compliant platform. He agrees to proceed.    Patient agrees to participate in a virtual check in via telephone or video visit instead of presenting to the office to address urgent/immediate medical needs. Patient is aware this is a billable service. He acknowledged consent and understanding of privacy and security of the video platform.  The patient has agreed to participate and understands they can discontinue the visit at any time.    After connecting through [employer], the patient was identified by name and date of birth. Gabe Pickard was informed that this was a telemedicine visit and that the exam was being conducted confidentially over secure lines. My office door was closed. No one else was in the room. Gabe Pickard acknowledged consent and understanding of privacy and security of the telemedicine visit. I informed the patient that I have reviewed his record in Epic and presented the opportunity for him to ask any questions regarding the visit today. The patient agreed to participate.     Verification of patient location:  Patient is located in the following state in which I hold an active license: [place]    Subjective:   Gabe Pickard is a 78 y.o. male who has been screened for COVID-19. Symptom change since last report: worsening. Patient's symptoms include fatigue, nasal congestion, rhinorrhea, sore throat, myalgias and headache. Patient denies fever, chills, anosmia, loss of taste, cough, shortness of breath, chest tightness, abdominal pain, nausea, vomiting and diarrhea.     - Date of symptom onset: 2/8/2024  - Date of exposure: 1/31/2024  - Date of positive COVID-19 test: 2/9/2024. Type of test: Home antigen. Patient with typical symptoms of COVID-19 and they attest that they were positive on home rapid antigen testing. Image of positive result is not able to be uploaded into their chart.     COVID-19 vaccination status: Fully vaccinated with booster    Gabe has been staying home and has isolated themselves in his home. He is taking care to not share personal items and is cleaning all surfaces that are touched often, like counters, tabletops, and doorknobs using household cleaning sprays or wipes. He is wearing a mask when he leaves his room.     Pt with exposure to COVID last week (wife tested positive last week).  He started  with congestion last night and woke up this am with runny  nose/ST/HA and body aches.  He feels very fatigued.  He is NOT coughing. He notes no SOB/wheezing or GI symptoms.  He is not using anything for his symptoms    Lab Results   Component Value Date    SARSCOV2 Not Detected 11/18/2023    SARSCOV2 Negative 03/23/2023       Review of Systems   Constitutional:  Positive for fatigue. Negative for chills and fever.   HENT:  Positive for congestion, rhinorrhea and sore throat.    Respiratory:  Negative for cough, chest tightness and shortness of breath.    Gastrointestinal:  Negative for abdominal pain, diarrhea, nausea and vomiting.   Musculoskeletal:  Positive for myalgias.   Neurological:  Positive for headaches.     Current Outpatient Medications on File Prior to Visit   Medication Sig    aspirin 81 MG tablet Take 1 tablet (81 mg total) by mouth daily    atorvastatin (LIPITOR) 40 mg tablet TAKE 1 TABLET BY MOUTH EVERY DAY    cholecalciferol (VITAMIN D3) 1,000 units tablet Take 2 tablets (2,000 Units total) by mouth daily    dexamethasone (DECADRON) 4 mg tablet     ferrous sulfate 324 (65 Fe) mg TAKE 1 TABLET BY MOUTH EVERY DAY WITH BREAKFAST    fluorouracil (EFUDEX) 5 % cream Apply topically 2 (two) times a day    furosemide (LASIX) 20 mg tablet TAKE 1 TABLET BY MOUTH TWICE A DAY    IMMUNE GLOBULIN, HUMAN, IV Inject 30,000 mg into a catheter in a vein    Jardiance 25 MG TABS TAKE 1 TABLET BY MOUTH EVERY DAY IN THE MORNING    metFORMIN (GLUCOPHAGE-XR) 500 mg 24 hr tablet TAKE 3 TABLETS (1,500 MG TOTAL) BY MOUTH DAILY WITH BREAKFAST    metoprolol succinate (TOPROL-XL) 100 mg 24 hr tablet TAKE 1 TABLET BY MOUTH EVERY DAY    montelukast (SINGULAIR) 10 mg tablet Take 10 mg by mouth daily    multivitamin-minerals (CENTRUM ADULTS) tablet Take 1 tablet by mouth daily    omeprazole (PriLOSEC) 40 MG capsule TAKE 1 CAPSULE (40 MG TOTAL) BY MOUTH DAILY.    valACYclovir (VALTREX) 500 mg tablet Take 500 mg by mouth daily        Objective:    Temp 99.5 °F (37.5 °C)        Physical Exam  Vitals and nursing note reviewed.   Constitutional:       General: He is not in acute distress.     Appearance: He is not ill-appearing.   HENT:      Head: Normocephalic and atraumatic.   Eyes:      General:         Right eye: No discharge.         Left eye: No discharge.      Conjunctiva/sclera: Conjunctivae normal.   Pulmonary:      Effort: Pulmonary effort is normal. No respiratory distress.   Skin:     Coloration: Skin is not pale.      Findings: No rash.   Psychiatric:         Mood and Affect: Mood normal.         Behavior: Behavior normal.         Thought Content: Thought content normal.         Judgment: Judgment normal.       Radha Pettit DO

## 2024-02-21 DIAGNOSIS — Z00.6 ENCOUNTER FOR EXAMINATION FOR NORMAL COMPARISON OR CONTROL IN CLINICAL RESEARCH PROGRAM: ICD-10-CM

## 2024-02-22 LAB
ALBUMIN SERPL-MCNC: 4.4 G/DL (ref 3.8–4.8)
ALBUMIN/CREAT UR: 17 MG/G CREAT (ref 0–29)
ALBUMIN/GLOB SERPL: 2.1 {RATIO} (ref 1.2–2.2)
ALP SERPL-CCNC: 124 IU/L (ref 44–121)
ALT SERPL-CCNC: 11 IU/L (ref 0–44)
AST SERPL-CCNC: 18 IU/L (ref 0–40)
BILIRUB SERPL-MCNC: 0.4 MG/DL (ref 0–1.2)
BUN SERPL-MCNC: 11 MG/DL (ref 8–27)
BUN/CREAT SERPL: 11 (ref 10–24)
CALCIUM SERPL-MCNC: 9.4 MG/DL (ref 8.6–10.2)
CHLORIDE SERPL-SCNC: 100 MMOL/L (ref 96–106)
CHOLEST SERPL-MCNC: 117 MG/DL (ref 100–199)
CHOLEST/HDLC SERPL: 3.8 RATIO (ref 0–5)
CO2 SERPL-SCNC: 31 MMOL/L (ref 20–29)
CREAT SERPL-MCNC: 1 MG/DL (ref 0.76–1.27)
CREAT UR-MCNC: 80.1 MG/DL
EGFR: 78 ML/MIN/1.73
EST. AVERAGE GLUCOSE BLD GHB EST-MCNC: 123 MG/DL
GLOBULIN SER-MCNC: 2.1 G/DL (ref 1.5–4.5)
GLUCOSE SERPL-MCNC: 95 MG/DL (ref 70–99)
HBA1C MFR BLD: 5.9 % (ref 4.8–5.6)
HDLC SERPL-MCNC: 31 MG/DL
LDLC SERPL CALC-MCNC: 69 MG/DL (ref 0–99)
MICROALBUMIN UR-MCNC: 13.5 UG/ML
POTASSIUM SERPL-SCNC: 4 MMOL/L (ref 3.5–5.2)
PROT SERPL-MCNC: 6.5 G/DL (ref 6–8.5)
SL AMB VLDL CHOLESTEROL CALC: 17 MG/DL (ref 5–40)
SODIUM SERPL-SCNC: 144 MMOL/L (ref 134–144)
TRIGL SERPL-MCNC: 88 MG/DL (ref 0–149)

## 2024-02-26 ENCOUNTER — APPOINTMENT (OUTPATIENT)
Dept: LAB | Facility: HOSPITAL | Age: 79
End: 2024-02-26

## 2024-02-26 DIAGNOSIS — Z00.6 ENCOUNTER FOR EXAMINATION FOR NORMAL COMPARISON OR CONTROL IN CLINICAL RESEARCH PROGRAM: ICD-10-CM

## 2024-02-26 PROCEDURE — 36415 COLL VENOUS BLD VENIPUNCTURE: CPT

## 2024-02-27 NOTE — PROGRESS NOTES
Established Patient Progress Note       Chief Complaint   Patient presents with    Diabetes Type 2      History of Present Illness:     Gabe Pickard is a 78 y.o. male with a history of T2DM since 5-6 years on metformin/jardiance only w/o any microvascular complications with CAD s/p CABG with recent dx of MM on chemotherapy on Dex every month. Last visit 08/23.     Current chemotherapy plan:- Dexamethasone 20mg on day of chemotherapy, and also reports gets steriods through IV with chemo. Doesn't know dose or type. Chemo once a month.. Maintenance therapy.     Blood Sugar/Glucometer/Pump/CGM review: 100-130. Highest 170- came down to 115 after  Current regime:-   Metformin 1500mg daily, jardiance 25mg     Medications tried/failed in past:- None  Hypoglycemic episodes: None  Hyperglycemia:-   denies any blurry vision, headaches. Polyuria d/t lasix use  Diet- reports does have appetite but eats since he knows he needs too  Activity- stays active  Weight- stable    last eye exam- Recently had exam last week, no retinopathy  last foot exam - Done 03/13/23  History of hypertension- Yes on toprol 100mg   History of hyperlipidemia- Yes on lipitor 40mg   Last lipid:- 02/24, LDL <100   Last urine microalbumina:- 02/24- normal   Last HbA1C 02/24 5.9%  08/23 5.9%, 02/23:- 8.9%, 05/23 6.2%    Social hx:- Does not smoke, drink alcohol or use any other drugs   Family hX:- father and brother have t2DM    Patient Active Problem List   Diagnosis    Basal cell carcinoma of skin    Coronary artery disease involving native heart with angina pectoris, unspecified vessel or lesion type (HCC)    Type 2 diabetes mellitus without complication, without long-term current use of insulin (HCC)    Dyslipidemia    Erectile dysfunction    Fatty liver    Lymphoma (HCC)    Malignant tumor of cecum (HCC)    Multiple myeloma not having achieved remission (HCC)    Primary localized osteoarthritis of right knee    Hx of CABG    Essential  hypertension    S/P total knee arthroplasty, left    Chronic diastolic congestive heart failure (HCC)    History of aortic valve replacement with bioprosthetic valve    Leukocytosis    Paroxysmal atrial fibrillation (HCC)    Pharyngeal dysphagia    Personal history of colon cancer    Roberts's esophagus without dysplasia    Schatzki's ring of distal esophagus    Gastroesophageal reflux disease with esophagitis    Iron deficiency anemia    Mouth ulcers    Chest pain    Glossitis    CVID (common variable immunodeficiency) (HCC)    Sepsis without acute organ dysfunction (HCC)    Foraminal stenosis of cervical region    Cervical radiculopathy    Platelets decreased (HCC)      Past Medical History:   Diagnosis Date    Acute respiratory failure with hypoxia (HCC) 04/20/2022    Arthritis     Cancer (HCC)     Cataract     Colitis     Colon polyp     Diabetes mellitus (HCC) ????    type 2    Fatty liver     GERD (gastroesophageal reflux disease)     History of chemotherapy     History of radiation therapy     History of shingles 2018    History of transfusion     Hyperlipidemia     Hypertension     Shingles 2017      Past Surgical History:   Procedure Laterality Date    AORTIC VALVE REPLACEMENT      CARDIAC VALVE REPLACEMENT  2014    aorta    CATARACT EXTRACTION Bilateral     COLECTOMY      COLONOSCOPY  11/2019    Prior right hemicolectomy    CORONARY ARTERY BYPASS GRAFT      DENTAL SURGERY      JOINT REPLACEMENT  January 2019    left knee    KNEE SURGERY      LYMPH NODE BIOPSY  2003    LYMPHADENECTOMY      OTHER SURGICAL HISTORY      MAZE PROCEDURE    TN ARTHRP KNE CONDYLE&PLATU MEDIAL&LAT COMPARTMENTS Left 01/28/2019    Procedure: ARTHROPLASTY LEFT KNEE TOTAL;  Surgeon: Darell Delgado MD;  Location:  MAIN OR;  Service: Orthopedics    TN LARYNGOSCOPY DIRECT OPERATIVE W/BIOPSY Right 04/27/2022    Procedure: DIRECT LARYNGOSCOPY WITH BIOPSY RIGHT PHARYNX, POSSIBLE RIGHT NECK LYMPH NODE BIOPSY; FROZEN SECTION;  Surgeon: Kevni  "LUISANA Hardin MD;  Location:  MAIN OR;  Service: ENT    SKIN BIOPSY      UPPER GASTROINTESTINAL ENDOSCOPY  11/2019    Schatzki ring    US GUIDED LYMPH NODE BIOPSY RIGHT  12/14/2021      Family History   Problem Relation Age of Onset    Heart block Family     Coronary artery disease Mother     Thrombosis Mother     Hypertension Mother     Arthritis Mother     Hyperlipidemia Mother     Diabetes Father     Hypertension Father     Arthritis Father     Sudden death Father         cardiac    Diabetes type II Father     Colon cancer Paternal Grandfather     Cancer Paternal Grandfather     Breast cancer Sister     Heart disease Brother         Coronary stents     Social History     Tobacco Use    Smoking status: Former     Current packs/day: 0.00     Average packs/day: 1 pack/day for 42.0 years (42.0 ttl pk-yrs)     Types: Cigarettes     Start date: 1967     Quit date: 1/1/2009     Years since quitting: 15.1    Smokeless tobacco: Never   Substance Use Topics    Alcohol use: Yes     Alcohol/week: 2.0 standard drinks of alcohol     Types: 2 Cans of beer per week     Comment: very rare \"beer here and there\"     Allergies   Allergen Reactions    Ceftin [Cefuroxime] Itching     However, has tolerated Cefazolin and Pip-Tazo since, which have different side chains. Be cautious with / avoid 2nd, 3rd, or 4th Gen Cephs that have similar side chains to Cefuroxime.    Lantus [Insulin Glargine] Itching       Current Outpatient Medications:     aspirin 81 MG tablet, Take 1 tablet (81 mg total) by mouth daily, Disp: , Rfl:     atorvastatin (LIPITOR) 40 mg tablet, TAKE 1 TABLET BY MOUTH EVERY DAY, Disp: 90 tablet, Rfl: 3    cholecalciferol (VITAMIN D3) 1,000 units tablet, Take 2 tablets (2,000 Units total) by mouth daily, Disp: 60 tablet, Rfl: 0    dexamethasone (DECADRON) 4 mg tablet, , Disp: , Rfl:     ferrous sulfate 324 (65 Fe) mg, TAKE 1 TABLET BY MOUTH EVERY DAY WITH BREAKFAST, Disp: 90 tablet, Rfl: 0    fluorouracil (EFUDEX) 5 % cream, " "Apply topically 2 (two) times a day, Disp: , Rfl:     furosemide (LASIX) 20 mg tablet, TAKE 1 TABLET BY MOUTH TWICE A DAY, Disp: 180 tablet, Rfl: 0    IMMUNE GLOBULIN, HUMAN, IV, Inject 30,000 mg into a catheter in a vein, Disp: , Rfl:     Jardiance 25 MG TABS, TAKE 1 TABLET BY MOUTH EVERY DAY IN THE MORNING, Disp: 90 tablet, Rfl: 1    metFORMIN (GLUCOPHAGE-XR) 500 mg 24 hr tablet, TAKE 3 TABLETS (1,500 MG TOTAL) BY MOUTH DAILY WITH BREAKFAST, Disp: 120 tablet, Rfl: 3    metoprolol succinate (TOPROL-XL) 100 mg 24 hr tablet, TAKE 1 TABLET BY MOUTH EVERY DAY, Disp: 90 tablet, Rfl: 1    montelukast (SINGULAIR) 10 mg tablet, Take 10 mg by mouth daily, Disp: , Rfl:     multivitamin-minerals (CENTRUM ADULTS) tablet, Take 1 tablet by mouth daily, Disp:  , Rfl: 0    omeprazole (PriLOSEC) 40 MG capsule, TAKE 1 CAPSULE (40 MG TOTAL) BY MOUTH DAILY., Disp: 90 capsule, Rfl: 2    valACYclovir (VALTREX) 500 mg tablet, Take 500 mg by mouth daily, Disp: , Rfl:     Review of Systems   Constitutional:  Negative for diaphoresis, fatigue and unexpected weight change.   Respiratory:  Negative for shortness of breath.    Cardiovascular:  Negative for chest pain and palpitations.   Gastrointestinal:  Negative for constipation and diarrhea.   Endocrine: Positive for polyuria (On lasix). Negative for polydipsia.       Physical Exam:  Body mass index is 22.87 kg/m².  /78   Pulse 64   Ht 5' 10\" (1.778 m)   Wt 72.3 kg (159 lb 6.4 oz)   SpO2 97%   BMI 22.87 kg/m²    Wt Readings from Last 3 Encounters:   02/28/24 72.3 kg (159 lb 6.4 oz)   01/15/24 68 kg (150 lb)   11/21/23 70.7 kg (155 lb 12.8 oz)       Physical Exam  Constitutional:       Appearance: Normal appearance.   Cardiovascular:      Rate and Rhythm: Normal rate and regular rhythm.      Pulses: Normal pulses.           Dorsalis pedis pulses are 2+ on the right side and 2+ on the left side.   Pulmonary:      Effort: Pulmonary effort is normal.   Abdominal:      General: " Abdomen is flat.      Palpations: Abdomen is soft.   Feet:      Right foot:      Skin integrity: No ulcer, skin breakdown, erythema, warmth, callus or dry skin.      Left foot:      Skin integrity: No ulcer, skin breakdown, erythema, warmth, callus or dry skin.   Skin:     General: Skin is warm.      Capillary Refill: Capillary refill takes less than 2 seconds.   Neurological:      General: No focal deficit present.      Mental Status: He is alert.       Labs:    Latest Reference Range & Units 07/27/22 09:45 02/01/23 08:49 05/01/23 09:30 08/16/23 08:14 02/21/24 08:12   Hemoglobin A1C 4.8 - 5.6 % 6.6 (H) 8.9 (H) 6.2 (H) 5.9 (H) 5.9 (H)   (H): Data is abnormally high   Latest Reference Range & Units 08/16/23 08:14 02/21/24 08:12   EXT Creatinine Urine Not Estab. mg/dL 66.5 80.1   Albumin Creat Ratio 0 - 29 mg/g creat 23 17   Albumin,U,Random Not Estab. ug/mL 15.3 13.5      Latest Reference Range & Units 08/16/23 08:14 02/21/24 08:12   Cholesterol 100 - 199 mg/dL 106 117   Triglycerides 0 - 149 mg/dL 149 88   HDL >39 mg/dL 34 (L) 31 (L)   LDL Calculated 0 - 99 mg/dL 46 69   VLDL Cholesterol Shaw 5 - 40 mg/dL 26 17   (L): Data is abnormally low     04/14/18 08:44 07/26/19 11:26 03/18/22 11:51   Left Eye Diabetic Retinopathy None (E) None (E) None (E)   (E): External lab result   04/14/18 08:44 07/26/19 11:26 03/18/22 11:51   Right Eye Diabetic Retinopathy None (E) None (E) None (E)   (E): External lab result  Impression & Plan:    Problem List Items Addressed This Visit          Endocrine    Type 2 diabetes mellitus without complication, without long-term current use of insulin (HCC) - Primary (Chronic)    Relevant Orders    Comprehensive metabolic panel    Albumin / creatinine urine ratio    Lipid panel    Hemoglobin A1C       Cardiovascular and Mediastinum    Essential hypertension (Chronic)    Relevant Orders    Comprehensive metabolic panel    Albumin / creatinine urine ratio    Lipid panel    Hemoglobin A1C        Other    Dyslipidemia (Chronic)    Relevant Orders    Comprehensive metabolic panel    Albumin / creatinine urine ratio    Lipid panel    Hemoglobin A1C         Orders Placed This Encounter   Procedures    Comprehensive metabolic panel     This is a patient instruction: Patient fasting for 8 hours or longer recommended.     Standing Status:   Future     Number of Occurrences:   1     Standing Expiration Date:   2/27/2025    Albumin / creatinine urine ratio     Standing Status:   Future     Number of Occurrences:   1     Standing Expiration Date:   2/27/2025    Lipid panel     This is a patient instruction: This test requires patient fasting for 10-12 hours or longer. Drinking of black coffee or black tea is acceptable.     Standing Status:   Future     Number of Occurrences:   1     Standing Expiration Date:   2/27/2025    Hemoglobin A1C     Standing Status:   Future     Number of Occurrences:   1     Standing Expiration Date:   2/27/2025       There are no Patient Instructions on file for this visit.    Patient is a 77yM with PMHx of well controlled T2DM since 6 years on metformin/jardiance only with complication on CAD s/p CABG in 2014 with dx of multiple myeloma on chemotherapy with some steriod induced hyperglycemia with other PMHx of hypertension, hyperlipidemia who presents today for Diabetes management. Last visit 08/23.     1) T2DM:- Patients HbA1C continuous to stay at prediabetic range at 5.9% now. BG reported are in range. No medication side effects. Recommend continuing with current regime. Continue to check BG once a day, reach out to me if having high or low BG. Repeat labs in 6 months now     Plan   - c/w Metformin 1500mg daily   - c/w jardiance 25mg daily   - Check BG once a day- alternate fasting and bedtime  - Repeat HbA1C in 6 months     Screening  - Retinopathy- uptodate  - Lipid- uptodate 02/24 LDL <100, c/w Lipitor 20mg daily, repeat next visit  - Urine microalbumin- Uptodate 02/24, normal,  repeat next visit    2) Hyperlipidemia:-  uptodate 02/24, LDL <100, c/w Lipitor 20mg daily, repeat next visit  3) Hypertension:- BP in clinic controlled at  , c/w toprol     Discussed with the patient and all questioned fully answered. He will call me if any problems arise.    Follow-up appointment in 6 months.     Counseled patient on diagnostic results, prognosis, risk and benefit of treatment options, instruction for management, importance of treatment compliance, Risk  factor reduction and impressions      Lucita La MD

## 2024-02-28 ENCOUNTER — OFFICE VISIT (OUTPATIENT)
Dept: ENDOCRINOLOGY | Facility: HOSPITAL | Age: 79
End: 2024-02-28
Payer: COMMERCIAL

## 2024-02-28 VITALS
OXYGEN SATURATION: 97 % | DIASTOLIC BLOOD PRESSURE: 78 MMHG | HEART RATE: 64 BPM | WEIGHT: 159.4 LBS | HEIGHT: 70 IN | SYSTOLIC BLOOD PRESSURE: 118 MMHG | BODY MASS INDEX: 22.82 KG/M2

## 2024-02-28 DIAGNOSIS — E11.9 TYPE 2 DIABETES MELLITUS WITHOUT COMPLICATION, WITHOUT LONG-TERM CURRENT USE OF INSULIN (HCC): Primary | ICD-10-CM

## 2024-02-28 DIAGNOSIS — I10 ESSENTIAL HYPERTENSION: ICD-10-CM

## 2024-02-28 DIAGNOSIS — E78.5 DYSLIPIDEMIA: ICD-10-CM

## 2024-02-28 PROCEDURE — 99214 OFFICE O/P EST MOD 30 MIN: CPT | Performed by: STUDENT IN AN ORGANIZED HEALTH CARE EDUCATION/TRAINING PROGRAM

## 2024-02-29 DIAGNOSIS — I50.32 CHRONIC DIASTOLIC CONGESTIVE HEART FAILURE (HCC): ICD-10-CM

## 2024-02-29 DIAGNOSIS — I10 ESSENTIAL HYPERTENSION: ICD-10-CM

## 2024-02-29 DIAGNOSIS — I25.10 CORONARY ARTERY DISEASE INVOLVING NATIVE CORONARY ARTERY OF NATIVE HEART WITHOUT ANGINA PECTORIS: ICD-10-CM

## 2024-02-29 DIAGNOSIS — I48.0 PAROXYSMAL ATRIAL FIBRILLATION (HCC): ICD-10-CM

## 2024-02-29 RX ORDER — METOPROLOL SUCCINATE 100 MG/1
TABLET, EXTENDED RELEASE ORAL
Qty: 90 TABLET | Refills: 1 | Status: SHIPPED | OUTPATIENT
Start: 2024-02-29

## 2024-03-16 LAB
APOB+LDLR+PCSK9 GENE MUT ANL BLD/T: NOT DETECTED
BRCA1+BRCA2 DEL+DUP + FULL MUT ANL BLD/T: NOT DETECTED
MLH1+MSH2+MSH6+PMS2 GN DEL+DUP+FUL M: NOT DETECTED

## 2024-03-22 DIAGNOSIS — E11.9 TYPE 2 DIABETES MELLITUS WITHOUT COMPLICATION, WITHOUT LONG-TERM CURRENT USE OF INSULIN (HCC): Chronic | ICD-10-CM

## 2024-03-22 RX ORDER — EMPAGLIFLOZIN 25 MG/1
25 TABLET, FILM COATED ORAL EVERY MORNING
Qty: 90 TABLET | Refills: 1 | Status: SHIPPED | OUTPATIENT
Start: 2024-03-22

## 2024-03-23 DIAGNOSIS — R07.9 CHEST PAIN: ICD-10-CM

## 2024-03-23 DIAGNOSIS — D50.9 IRON DEFICIENCY ANEMIA, UNSPECIFIED IRON DEFICIENCY ANEMIA TYPE: ICD-10-CM

## 2024-03-23 RX ORDER — FERROUS SULFATE 324(65)MG
TABLET, DELAYED RELEASE (ENTERIC COATED) ORAL
Qty: 90 TABLET | Refills: 0 | Status: SHIPPED | OUTPATIENT
Start: 2024-03-23

## 2024-03-23 RX ORDER — FUROSEMIDE 20 MG/1
TABLET ORAL
Qty: 180 TABLET | Refills: 0 | Status: SHIPPED | OUTPATIENT
Start: 2024-03-23

## 2024-04-19 NOTE — ASSESSMENT & PLAN NOTE
Continue metoprolol  We will hold Lasix due to sepsis potentially resume tomorrow 11/20/2023 generalized

## 2024-04-30 DIAGNOSIS — C90.00 MULTIPLE MYELOMA NOT HAVING ACHIEVED REMISSION (HCC): ICD-10-CM

## 2024-05-01 RX ORDER — MONTELUKAST SODIUM 10 MG/1
10 TABLET ORAL DAILY
Qty: 90 TABLET | Refills: 1 | Status: SHIPPED | OUTPATIENT
Start: 2024-05-01

## 2024-05-07 DIAGNOSIS — E11.9 TYPE 2 DIABETES MELLITUS WITHOUT COMPLICATION, WITHOUT LONG-TERM CURRENT USE OF INSULIN (HCC): Chronic | ICD-10-CM

## 2024-05-07 RX ORDER — METFORMIN HYDROCHLORIDE 500 MG/1
TABLET, EXTENDED RELEASE ORAL
Qty: 120 TABLET | Refills: 5 | Status: SHIPPED | OUTPATIENT
Start: 2024-05-07

## 2024-05-14 ENCOUNTER — NURSE TRIAGE (OUTPATIENT)
Age: 79
End: 2024-05-14

## 2024-05-14 NOTE — TELEPHONE ENCOUNTER
"Reason for Disposition   All other patients with chest pain (Exception: fleeting chest pain lasting a few seconds)    Answer Assessment - Initial Assessment Questions  1. LOCATION: \"Where does it hurt?\"        Center of chest  2. RADIATION: \"Does the pain go anywhere else?\" (e.g., into neck, jaw, arms, back)      Left arm once in a while  3. ONSET: \"When did the chest pain begin?\" (Minutes, hours or days)       Month ago  4. PATTERN \"Does the pain come and go, or has it been constant since it started?\"  \"Does it get worse with exertion?\"       Comes and goes  5. DURATION: \"How long does it last\" (e.g., seconds, minutes, hours)      Hour   6. SEVERITY: \"How bad is the pain?\"  (e.g., Scale 1-10; mild, moderate, or severe)     - MILD (1-3): doesn't interfere with normal activities      - MODERATE (4-7): interferes with normal activities or awakens from sleep     - SEVERE (8-10): excruciating pain, unable to do any normal activities        2/10  7. CARDIAC RISK FACTORS: \"Do you have any history of heart problems or risk factors for heart disease?\" (e.g., angina, prior heart attack; diabetes, high blood pressure, high cholesterol, smoker, or strong family history of heart disease)      Aortic valve replacement 2014  8. PULMONARY RISK FACTORS: \"Do you have any history of lung disease?\"  (e.g., blood clots in lung, asthma, emphysema, birth control pills)      Denies   9. CAUSE: \"What do you think is causing the chest pain?\"      Unsure   10. OTHER SYMPTOMS: \"Do you have any other symptoms?\" (e.g., dizziness, nausea, vomiting, sweating, fever, difficulty breathing, cough)        Denies    Protocols used: Chest Pain-ADULT-[place]    "

## 2024-05-14 NOTE — TELEPHONE ENCOUNTER
Patient calls in regards to a PET Scan he had done recently.  He states it showed severe aortic arteriosclerosis.  Patient states he has been having chest pain lasting about an hour, that has been occurring once a week for about a month.    Scheduled patient for visit tomorrow with Dr. Garcia.      Patient will bring copy of PET scan report, he will call facility he completed it at to have them send imaging as well.

## 2024-05-15 ENCOUNTER — OFFICE VISIT (OUTPATIENT)
Dept: CARDIOLOGY CLINIC | Facility: CLINIC | Age: 79
End: 2024-05-15
Payer: COMMERCIAL

## 2024-05-15 VITALS
DIASTOLIC BLOOD PRESSURE: 56 MMHG | SYSTOLIC BLOOD PRESSURE: 122 MMHG | WEIGHT: 156 LBS | HEART RATE: 67 BPM | BODY MASS INDEX: 22.33 KG/M2 | HEIGHT: 70 IN

## 2024-05-15 DIAGNOSIS — I25.119 CORONARY ARTERY DISEASE INVOLVING NATIVE HEART WITH ANGINA PECTORIS, UNSPECIFIED VESSEL OR LESION TYPE (HCC): ICD-10-CM

## 2024-05-15 DIAGNOSIS — I48.0 PAROXYSMAL ATRIAL FIBRILLATION (HCC): ICD-10-CM

## 2024-05-15 DIAGNOSIS — I50.32 CHRONIC DIASTOLIC CONGESTIVE HEART FAILURE (HCC): ICD-10-CM

## 2024-05-15 DIAGNOSIS — I25.10 CORONARY ARTERY DISEASE INVOLVING NATIVE CORONARY ARTERY OF NATIVE HEART WITHOUT ANGINA PECTORIS: Primary | ICD-10-CM

## 2024-05-15 PROBLEM — I50.23 ACUTE ON CHRONIC SYSTOLIC (CONGESTIVE) HEART FAILURE (HCC): Status: ACTIVE | Noted: 2024-05-15

## 2024-05-15 PROCEDURE — 93000 ELECTROCARDIOGRAM COMPLETE: CPT | Performed by: INTERNAL MEDICINE

## 2024-05-15 PROCEDURE — 99214 OFFICE O/P EST MOD 30 MIN: CPT | Performed by: INTERNAL MEDICINE

## 2024-05-15 NOTE — PROGRESS NOTES
Cardiology Follow Up    Gabe Pickard  1945  [id number]  [facility] [place] [address]  [facility] [address]  [facility] [address] [phone]    1. Coronary artery disease involving native coronary artery of native heart without angina pectoris  POCT ECG      2. Paroxysmal atrial fibrillation (HCC)        3. Coronary artery disease involving native heart with angina pectoris, unspecified vessel or lesion type (HCC)        4. Chronic diastolic congestive heart failure (HCC)            Interval History: Followup HFPEF, PAF, CAD    No chest pain, dyspnea or palpitations.     Medical Problems       Problem List       Basal cell carcinoma of skin    Coronary artery disease involving native heart with angina pectoris, unspecified vessel or lesion type (HCC)    Type 2 diabetes mellitus with hyperglycemia, without long-term current use of insulin (HCC)      Lab Results   Component Value Date    HGBA1C 5.9 (H) 02/21/2024         Dyslipidemia (Chronic)    Erectile dysfunction    Fatty liver    Lymphoma (HCC) (Chronic)    Overview Signed 2/8/2018 11:17 AM by Radha Pettit DO     Description: small cell lymphocytic lymphoma         Malignant tumor of cecum (HCC)    Multiple myeloma not having achieved remission (HCC)    Primary localized osteoarthritis of right knee    Hx of CABG    Essential hypertension (Chronic)    S/P total knee arthroplasty, left    Overview Signed 1/28/2019  4:19 PM by DUNG Stein     -  S/P L TKA today  -  Orthopedic surgery attending           Chronic diastolic congestive heart failure (HCC)    Wt Readings from Last 3 Encounters:   05/15/24 70.8 kg (156 lb)   02/28/24 72.3 kg (159 lb 6.4 oz)   01/15/24 68 kg (150 lb)                 History of aortic valve replacement with bioprosthetic valve (Chronic)    Leukocytosis    Paroxysmal atrial fibrillation (HCC) (Chronic)    Pharyngeal dysphagia    Personal history of  "colon cancer    Roberts's esophagus without dysplasia    Schatzki's ring of distal esophagus    Gastroesophageal reflux disease with esophagitis (Chronic)    Iron deficiency anemia    Mouth ulcers    Chest pain    Glossitis    CVID (common variable immunodeficiency) (HCC)    Sepsis without acute organ dysfunction (HCC)    Foraminal stenosis of cervical region    Cervical radiculopathy    Platelets decreased (HCC)    Acute on chronic systolic (congestive) heart failure (HCC)    Wt Readings from Last 3 Encounters:   05/15/24 70.8 kg (156 lb)   02/28/24 72.3 kg (159 lb 6.4 oz)   01/15/24 68 kg (150 lb)                     Past Medical History:   Diagnosis Date    Acute respiratory failure with hypoxia (HCC) 04/20/2022    Arthritis     Cancer (HCC)     Cataract     Colitis     Colon polyp     Diabetes mellitus (HCC) ????    type 2    Fatty liver     GERD (gastroesophageal reflux disease)     History of chemotherapy     History of radiation therapy     History of shingles 2018    History of transfusion     Hyperlipidemia     Hypertension     Shingles 2017     Social History     Socioeconomic History    Marital status: /Civil Union     Spouse name: Not on file    Number of children: Not on file    Years of education: Not on file    Highest education level: Not on file   Occupational History    Occupation: WORKING FULLTIME    Tobacco Use    Smoking status: Former     Current packs/day: 0.00     Average packs/day: 1 pack/day for 42.0 years (42.0 ttl pk-yrs)     Types: Cigarettes     Start date: 1967     Quit date: 1/1/2009     Years since quitting: 15.3    Smokeless tobacco: Never   Vaping Use    Vaping status: Never Used   Substance and Sexual Activity    Alcohol use: Yes     Alcohol/week: 2.0 standard drinks of alcohol     Types: 2 Cans of beer per week     Comment: very rare \"beer here and there\"    Drug use: No    Sexual activity: Not Currently     Partners: Female     Birth control/protection: None   Other " Topics Concern    Not on file   Social History Narrative    Not on file     Social Determinants of Health     Financial Resource Strain: Low Risk  (5/15/2023)    Overall Financial Resource Strain (CARDIA)     Difficulty of Paying Living Expenses: Not hard at all   Food Insecurity: No Food Insecurity (3/24/2023)    Hunger Vital Sign     Worried About Running Out of Food in the Last Year: Never true     Ran Out of Food in the Last Year: Never true   Transportation Needs: No Transportation Needs (5/15/2023)    PRAPARE - Transportation     Lack of Transportation (Medical): No     Lack of Transportation (Non-Medical): No   Physical Activity: Not on file   Stress: Not on file   Social Connections: Not on file   Intimate Partner Violence: Not on file   Housing Stability: Low Risk  (3/24/2023)    Housing Stability Vital Sign     Unable to Pay for Housing in the Last Year: No     Number of Places Lived in the Last Year: 1     Unstable Housing in the Last Year: No      Family History   Problem Relation Age of Onset    Heart block Family     Coronary artery disease Mother     Thrombosis Mother     Hypertension Mother     Arthritis Mother     Hyperlipidemia Mother     Diabetes Father     Hypertension Father     Arthritis Father     Sudden death Father         cardiac    Diabetes type II Father     Colon cancer Paternal Grandfather     Cancer Paternal Grandfather     Breast cancer Sister     Heart disease Brother         Coronary stents     Past Surgical History:   Procedure Laterality Date    AORTIC VALVE REPLACEMENT      CARDIAC VALVE REPLACEMENT  2014    aorta    CATARACT EXTRACTION Bilateral     COLECTOMY      COLONOSCOPY  11/2019    Prior right hemicolectomy    CORONARY ARTERY BYPASS GRAFT      DENTAL SURGERY      JOINT REPLACEMENT  January 2019    left knee    KNEE SURGERY      LYMPH NODE BIOPSY  2003    LYMPHADENECTOMY      OTHER SURGICAL HISTORY      MAZE PROCEDURE    KY ARTHRP KNE CONDYLE&PLATU MEDIAL&LAT COMPARTMENTS  Left 01/28/2019    Procedure: ARTHROPLASTY LEFT KNEE TOTAL;  Surgeon: Darell Delgado MD;  Location: [facility];  Service: Orthopedics    CT LARYNGOSCOPY DIRECT OPERATIVE W/BIOPSY Right 04/27/2022    Procedure: DIRECT LARYNGOSCOPY WITH BIOPSY RIGHT PHARYNX, POSSIBLE RIGHT NECK LYMPH NODE BIOPSY; FROZEN SECTION;  Surgeon: Kevin Hardin MD;  Location:  MAIN OR;  Service: ENT    SKIN BIOPSY      UPPER GASTROINTESTINAL ENDOSCOPY  11/2019    Schatzki ring    US GUIDED LYMPH NODE BIOPSY RIGHT  12/14/2021       Current Outpatient Medications:     aspirin 81 MG tablet, Take 1 tablet (81 mg total) by mouth daily, Disp: , Rfl:     atorvastatin (LIPITOR) 40 mg tablet, TAKE 1 TABLET BY MOUTH EVERY DAY, Disp: 90 tablet, Rfl: 3    cholecalciferol (VITAMIN D3) 1,000 units tablet, Take 2 tablets (2,000 Units total) by mouth daily, Disp: 60 tablet, Rfl: 0    dexamethasone (DECADRON) 4 mg tablet, , Disp: , Rfl:     ferrous sulfate 324 (65 Fe) mg, TAKE 1 TABLET BY MOUTH EVERY DAY WITH BREAKFAST, Disp: 90 tablet, Rfl: 0    fluorouracil (EFUDEX) 5 % cream, Apply topically 2 (two) times a day, Disp: , Rfl:     furosemide (LASIX) 20 mg tablet, TAKE 1 TABLET BY MOUTH TWICE A DAY, Disp: 180 tablet, Rfl: 0    IMMUNE GLOBULIN, HUMAN, IV, Inject 30,000 mg into a catheter in a vein, Disp: , Rfl:     Jardiance 25 MG TABS, TAKE 1 TABLET BY MOUTH EVERY DAY IN THE MORNING, Disp: 90 tablet, Rfl: 1    metFORMIN (GLUCOPHAGE-XR) 500 mg 24 hr tablet, TAKE 3 TABLETS (1,500 MG TOTAL) BY MOUTH DAILY WITH BREAKFAST, Disp: 120 tablet, Rfl: 5    metoprolol succinate (TOPROL-XL) 100 mg 24 hr tablet, TAKE 1 TABLET BY MOUTH EVERY DAY, Disp: 90 tablet, Rfl: 1    montelukast (SINGULAIR) 10 mg tablet, TAKE 1 TABLET BY MOUTH EVERY DAY, Disp: 90 tablet, Rfl: 1    multivitamin-minerals (CENTRUM ADULTS) tablet, Take 1 tablet by mouth daily, Disp:  , Rfl: 0    omeprazole (PriLOSEC) 40 MG capsule, TAKE 1 CAPSULE (40 MG TOTAL) BY MOUTH DAILY., Disp: 90 capsule, Rfl: 2     valACYclovir (VALTREX) 500 mg tablet, Take 500 mg by mouth daily, Disp: , Rfl:   Allergies   Allergen Reactions    Ceftin [Cefuroxime] Itching     However, has tolerated Cefazolin and Pip-Tazo since, which have different side chains. Be cautious with / avoid 2nd, 3rd, or 4th Gen Cephs that have similar side chains to Cefuroxime.    Lantus [Insulin Glargine] Itching       Labs:     Chemistry        Component Value Date/Time     05/19/2017 0720    K 4.0 02/21/2024 0812     02/21/2024 0812    CO2 31 (H) 02/21/2024 0812    BUN 11 02/21/2024 0812    CREATININE 1.00 02/21/2024 0812    CREATININE 0.85 11/20/2023 0413    CREATININE 0.93 05/19/2017 0720        Component Value Date/Time    CALCIUM 9.0 11/20/2023 0413    CALCIUM 9.3 05/19/2017 0720    ALKPHOS 70 03/24/2023 0433    ALKPHOS 97 05/19/2017 0720    AST 18 02/21/2024 0812    ALT 11 02/21/2024 0812    BILITOT 0.9 05/19/2017 0720            Lab Results   Component Value Date    CHOL 131 05/19/2017    CHOL 111 02/05/2016     Lab Results   Component Value Date    HDL 31 (L) 02/21/2024    HDL 34 (L) 08/16/2023    HDL 34 (L) 05/01/2023     Lab Results   Component Value Date    LDLCALC 69 02/21/2024    LDLCALC 46 08/16/2023    LDLCALC 61 05/01/2023     Lab Results   Component Value Date    TRIG 88 02/21/2024    TRIG 149 08/16/2023    TRIG 135 05/01/2023     Lab Results   Component Value Date    CHOLHDL 3.8 02/21/2024    CHOLHDL 3.1 08/16/2023    CHOLHDL 3.5 05/01/2023       Imaging: PET CT Whole Body Subsequent    Result Date: 5/14/2024  Narrative: Examination: Total body PET/CT. Date: 5/14/2024 History: Multiple myeloma Procedure: Following the intravenous injection of 12.4 mCi Fluorine-18 FDG, total body PET/CT was performed from the vertex of the skull to the feet. Attenuation correction was applied and fusion images were created from the source data. Blood glucose measured 97 mg/dL at the time of injection. Comparison studies: 12/20/2023 Findings: Head and  neck: There is no suspicious uptake throughout the aerodigestive tract. There is no cervical lymphadenopathy. There is no abnormal uptake throughout the subcutaneous tissues of the head and neck. Chest: There is no mediastinal or hilar lymphadenopathy. In the hypermetabolic upper mediastinal lymph node demonstrated previously is no longer hypermetabolic. There is no axillary or internal mammary adenopathy. There is severe aortic atherosclerosis. There are postoperative changes from CABG. There is cardiomegaly. Abdomen/pelvis: There is a focal hypermetabolic area within the left lobe of the liver with SUV max 4.8. A second area of increased uptake is noted within the kaylynn hepatis region with SUV max 5.5, not associated with an obvious anatomic abnormality. No additional abnormal uptake is demonstrated throughout the abdomen and pelvis. Spleen is mildly enlarged. Lower extremities: Negative. Bone marrow: Negative.    Impression: Impression: 1.  Interval resolution of hypermetabolism within the upper mediastinal lymph node. 2.  Focal hypermetabolic area within the left lobe of the liver. This is nonspecific, however, a metastasis cannot be excluded. Consider correlation with MRI of the abdomen. 3.  Focal area of increased uptake in the kaylynn hepatis region without obvious anatomic abnormality. No discrete soft tissue abnormality is demonstrated. Consider correlation with diagnostic CT abdomen and pelvis or MRI. 4.  No evidence of osseous metastatic disease. Electronically signed by:  Vaibhav Howard MD  05/14/2024 01:45 PM EDT Workstation: [id number]      EKG: NSR LVH Incomplete lBBB.    Review of Systems   Constitutional: Negative.   HENT: Negative.     Eyes: Negative.    Cardiovascular: Negative.    Respiratory: Negative.     Endocrine: Negative.    Hematologic/Lymphatic: Negative.    Skin: Negative.    Musculoskeletal: Negative.    Gastrointestinal: Negative.    Genitourinary: Negative.    Neurological: Negative.     Psychiatric/Behavioral: Negative.     Allergic/Immunologic: Negative.        Vitals:    05/15/24 1256   BP: 122/56   Pulse: 67           Physical Exam  Vitals reviewed.   Constitutional:       Appearance: Normal appearance.   HENT:      Head: Normocephalic.      Nose: Nose normal.      Mouth/Throat:      Mouth: Mucous membranes are moist.      Pharynx: Oropharynx is clear.   Eyes:      General: No scleral icterus.     Conjunctiva/sclera: Conjunctivae normal.   Cardiovascular:      Rate and Rhythm: Normal rate and regular rhythm.      Heart sounds: No murmur heard.     No friction rub. No gallop.   Pulmonary:      Effort: Pulmonary effort is normal. No respiratory distress.      Breath sounds: Normal breath sounds. No wheezing or rales.   Abdominal:      General: Abdomen is flat. Bowel sounds are normal. There is no distension.      Palpations: Abdomen is soft.      Tenderness: There is no abdominal tenderness. There is no guarding.   Musculoskeletal:      Cervical back: Normal range of motion and neck supple.      Right lower leg: No edema.      Left lower leg: No edema.   Skin:     General: Skin is warm and dry.   Neurological:      General: No focal deficit present.      Mental Status: He is alert and oriented to person, place, and time.   Psychiatric:         Mood and Affect: Mood normal.         Behavior: Behavior normal.         Discussion/Summary:    CAD S/P CABG  S/P Bio AVR  PAF in Past S/P MAZE and TALIA ligation.     Overall patient is doing well. Continue with current medical therapy for CAD. LDL 69.     The patient was counseled regarding diagnostic results, instructions for management, risk factor reductions, impressions. total time of encounter was 25 minutes and 15 minutes was spent counseling.

## 2024-05-21 ENCOUNTER — OFFICE VISIT (OUTPATIENT)
Dept: FAMILY MEDICINE CLINIC | Facility: HOSPITAL | Age: 79
End: 2024-05-21
Payer: COMMERCIAL

## 2024-05-21 VITALS
WEIGHT: 158.2 LBS | OXYGEN SATURATION: 97 % | TEMPERATURE: 97.5 F | HEIGHT: 70 IN | SYSTOLIC BLOOD PRESSURE: 138 MMHG | BODY MASS INDEX: 22.65 KG/M2 | DIASTOLIC BLOOD PRESSURE: 58 MMHG | HEART RATE: 67 BPM

## 2024-05-21 DIAGNOSIS — C90.00 MULTIPLE MYELOMA NOT HAVING ACHIEVED REMISSION (HCC): ICD-10-CM

## 2024-05-21 DIAGNOSIS — D69.6 PLATELETS DECREASED (HCC): ICD-10-CM

## 2024-05-21 DIAGNOSIS — D83.9 CVID (COMMON VARIABLE IMMUNODEFICIENCY) (HCC): ICD-10-CM

## 2024-05-21 DIAGNOSIS — Z00.00 MEDICARE ANNUAL WELLNESS VISIT, SUBSEQUENT: ICD-10-CM

## 2024-05-21 DIAGNOSIS — R19.5 DARK STOOLS: ICD-10-CM

## 2024-05-21 DIAGNOSIS — E11.9 TYPE 2 DIABETES MELLITUS WITHOUT COMPLICATION, WITHOUT LONG-TERM CURRENT USE OF INSULIN (HCC): Primary | ICD-10-CM

## 2024-05-21 DIAGNOSIS — I25.119 CORONARY ARTERY DISEASE INVOLVING NATIVE HEART WITH ANGINA PECTORIS, UNSPECIFIED VESSEL OR LESION TYPE (HCC): ICD-10-CM

## 2024-05-21 DIAGNOSIS — I48.0 PAROXYSMAL ATRIAL FIBRILLATION (HCC): Chronic | ICD-10-CM

## 2024-05-21 PROBLEM — A41.9 SEPSIS WITHOUT ACUTE ORGAN DYSFUNCTION (HCC): Status: RESOLVED | Noted: 2023-02-04 | Resolved: 2024-05-21

## 2024-05-21 PROBLEM — I50.23 ACUTE ON CHRONIC SYSTOLIC (CONGESTIVE) HEART FAILURE (HCC): Status: RESOLVED | Noted: 2024-05-15 | Resolved: 2024-05-21

## 2024-05-21 LAB — SL AMB POCT FECES OCC BLD: NORMAL

## 2024-05-21 PROCEDURE — G0439 PPPS, SUBSEQ VISIT: HCPCS | Performed by: INTERNAL MEDICINE

## 2024-05-21 PROCEDURE — 99214 OFFICE O/P EST MOD 30 MIN: CPT | Performed by: INTERNAL MEDICINE

## 2024-05-21 PROCEDURE — 82270 OCCULT BLOOD FECES: CPT | Performed by: INTERNAL MEDICINE

## 2024-05-21 NOTE — ASSESSMENT & PLAN NOTE
Lab Results   Component Value Date    HGBA1C 5.9 (H) 02/21/2024   DM type 2 w/o complications - controlled with A1C 5.9 -  just saw Endo in Feb, noting a lot of loose stools, will reach out to Endo about decreasing Metformin, cont' BW as per Endo, DM foot exam done in office today, DM eye exam 8/23, on a statin and ASA, will follow

## 2024-05-21 NOTE — PATIENT INSTRUCTIONS
Medicare Preventive Visit Patient Instructions  Thank you for completing your Welcome to Medicare Visit or Medicare Annual Wellness Visit today. Your next wellness visit will be due in one year (5/22/2025).  The screening/preventive services that you may require over the next 5-10 years are detailed below. Some tests may not apply to you based off risk factors and/or age. Screening tests ordered at today's visit but not completed yet may show as past due. Also, please note that scanned in results may not display below.  Preventive Screenings:  Service Recommendations Previous Testing/Comments   Colorectal Cancer Screening  Colonoscopy    Fecal Occult Blood Test (FOBT)/Fecal Immunochemical Test (FIT)  Fecal DNA/Cologuard Test  Flexible Sigmoidoscopy Age: 45-75 years old   Colonoscopy: every 10 years (May be performed more frequently if at higher risk)  OR  FOBT/FIT: every 1 year  OR  Cologuard: every 3 years  OR  Sigmoidoscopy: every 5 years  Screening may be recommended earlier than age 45 if at higher risk for colorectal cancer. Also, an individualized decision between you and your healthcare provider will decide whether screening between the ages of 76-85 would be appropriate. Colonoscopy: 01/06/2021  FOBT/FIT: Not on file  Cologuard: Not on file  Sigmoidoscopy: Not on file    History Colorectal Cancer  Risks and Benefits Discussed     Prostate Cancer Screening Individualized decision between patient and health care provider in men between ages of 55-69   Medicare will cover every 12 months beginning on the day after your 50th birthday PSA: 1.4 ng/mL     Screening Not Indicated  Risks and Benefits Discussed     Hepatitis C Screening Once for adults born between 1945 and 1965  More frequently in patients at high risk for Hepatitis C Hep C Antibody: 07/27/2022    Screening Current  Risks and Benefits Discussed   Diabetes Screening 1-2 times per year if you're at risk for diabetes or have pre-diabetes Fasting  glucose: No results in last 5 years (No results in last 5 years)  A1C: 5.9 % (2/21/2024)  History Diabetes  Risks and Benefits Discussed  Screening Current   Cholesterol Screening Once every 5 years if you don't have a lipid disorder. May order more often based on risk factors. Lipid panel: 02/21/2024  Screening Current  Risks and Benefits Discussed  History Lipid Disorder      Other Preventive Screenings Covered by Medicare:  Abdominal Aortic Aneurysm (AAA) Screening: covered once if your at risk. You're considered to be at risk if you have a family history of AAA or a male between the age of 65-75 who smoking at least 100 cigarettes in your lifetime.  Lung Cancer Screening: covers low dose CT scan once per year if you meet all of the following conditions: (1) Age 55-77; (2) No signs or symptoms of lung cancer; (3) Current smoker or have quit smoking within the last 15 years; (4) You have a tobacco smoking history of at least 20 pack years (packs per day x number of years you smoked); (5) You get a written order from a healthcare provider.  Glaucoma Screening: covered annually if you're considered high risk: (1) You have diabetes OR (2) Family history of glaucoma OR (3)  aged 50 and older OR (4)  American aged 65 and older  Osteoporosis Screening: covered every 2 years if you meet one of the following conditions: (1) Have a vertebral abnormality; (2) On glucocorticoid therapy for more than 3 months; (3) Have primary hyperparathyroidism; (4) On osteoporosis medications and need to assess response to drug therapy.  HIV Screening: covered annually if you're between the age of 15-65. Also covered annually if you are younger than 15 and older than 65 with risk factors for HIV infection. For pregnant patients, it is covered up to 3 times per pregnancy.    Immunizations:  Immunization Recommendations   Influenza Vaccine Annual influenza vaccination during flu season is recommended for all persons  aged >= 6 months who do not have contraindications   Pneumococcal Vaccine   * Pneumococcal conjugate vaccine = PCV13 (Prevnar 13), PCV15 (Vaxneuvance), PCV20 (Prevnar 20)  * Pneumococcal polysaccharide vaccine = PPSV23 (Pneumovax) Adults 19-65 yo with certain risk factors or if 65+ yo  If never received any pneumonia vaccine: recommend Prevnar 20 (PCV20)  Give PCV20 if previously received 1 dose of PCV13 or PPSV23   Hepatitis B Vaccine 3 dose series if at intermediate or high risk (ex: diabetes, end stage renal disease, liver disease)   Respiratory syncytial virus (RSV) Vaccine - COVERED BY MEDICARE PART D  * RSVPreF3 (Arexvy) CDC recommends that adults 60 years of age and older may receive a single dose of RSV vaccine using shared clinical decision-making (SCDM)   Tetanus (Td) Vaccine - COST NOT COVERED BY MEDICARE PART B Following completion of primary series, a booster dose should be given every 10 years to maintain immunity against tetanus. Td may also be given as tetanus wound prophylaxis.   Tdap Vaccine - COST NOT COVERED BY MEDICARE PART B Recommended at least once for all adults. For pregnant patients, recommended with each pregnancy.   Shingles Vaccine (Shingrix) - COST NOT COVERED BY MEDICARE PART B  2 shot series recommended in those 19 years and older who have or will have weakened immune systems or those 50 years and older     Health Maintenance Due:      Topic Date Due   • Hepatitis C Screening  Completed   • Lung Cancer Screening  Discontinued     Immunizations Due:      Topic Date Due   • COVID-19 Vaccine (8 - 2023-24 season) 11/27/2023     Advance Directives   What are advance directives?  Advance directives are legal documents that state your wishes and plans for medical care. These plans are made ahead of time in case you lose your ability to make decisions for yourself. Advance directives can apply to any medical decision, such as the treatments you want, and if you want to donate organs.   What  are the types of advance directives?  There are many types of advance directives, and each state has rules about how to use them. You may choose a combination of any of the following:  Living will:  This is a written record of the treatment you want. You can also choose which treatments you do not want, which to limit, and which to stop at a certain time. This includes surgery, medicine, IV fluid, and tube feedings.   Durable power of  for healthcare (DPAHC):  This is a written record that states who you want to make healthcare choices for you when you are unable to make them for yourself. This person, called a proxy, is usually a family member or a friend. You may choose more than 1 proxy.  Do not resuscitate (DNR) order:  A DNR order is used in case your heart stops beating or you stop breathing. It is a request not to have certain forms of treatment, such as CPR. A DNR order may be included in other types of advance directives.  Medical directive:  This covers the care that you want if you are in a coma, near death, or unable to make decisions for yourself. You can list the treatments you want for each condition. Treatment may include pain medicine, surgery, blood transfusions, dialysis, IV or tube feedings, and a ventilator (breathing machine).  Values history:  This document has questions about your views, beliefs, and how you feel and think about life. This information can help others choose the care that you would choose.  Why are advance directives important?  An advance directive helps you control your care. Although spoken wishes may be used, it is better to have your wishes written down. Spoken wishes can be misunderstood, or not followed. Treatments may be given even if you do not want them. An advance directive may make it easier for your family to make difficult choices about your care.       © Copyright [employer] 2018 Information is for End User's use only and may not be sold,  redistributed or otherwise used for commercial purposes. All illustrations and images included in CareNotes® are the copyrighted property of [employer] or [employer]    Medicare Preventive Visit Patient Instructions  Thank you for completing your Welcome to Medicare Visit or Medicare Annual Wellness Visit today. Your next wellness visit will be due in one year (5/22/2025).  The screening/preventive services that you may require over the next 5-10 years are detailed below. Some tests may not apply to you based off risk factors and/or age. Screening tests ordered at today's visit but not completed yet may show as past due. Also, please note that scanned in results may not display below.  Preventive Screenings:  Service Recommendations Previous Testing/Comments   Colorectal Cancer Screening  Colonoscopy    Fecal Occult Blood Test (FOBT)/Fecal Immunochemical Test (FIT)  Fecal DNA/Cologuard Test  Flexible Sigmoidoscopy Age: 45-75 years old   Colonoscopy: every 10 years (May be performed more frequently if at higher risk)  OR  FOBT/FIT: every 1 year  OR  Cologuard: every 3 years  OR  Sigmoidoscopy: every 5 years  Screening may be recommended earlier than age 45 if at higher risk for colorectal cancer. Also, an individualized decision between you and your healthcare provider will decide whether screening between the ages of 76-85 would be appropriate. Colonoscopy: 01/06/2021  FOBT/FIT: Not on file  Cologuard: Not on file  Sigmoidoscopy: Not on file    History Colorectal Cancer  Risks and Benefits Discussed     Prostate Cancer Screening Individualized decision between patient and health care provider in men between ages of 55-69   Medicare will cover every 12 months beginning on the day after your 50th birthday PSA: 1.4 ng/mL     Screening Not Indicated  Risks and Benefits Discussed     Hepatitis C Screening Once for adults born between 1945 and 1965  More frequently in patients at high risk for Hepatitis C Hep C  Antibody: 07/27/2022    Screening Current  Risks and Benefits Discussed   Diabetes Screening 1-2 times per year if you're at risk for diabetes or have pre-diabetes Fasting glucose: No results in last 5 years (No results in last 5 years)  A1C: 5.9 % (2/21/2024)  History Diabetes  Risks and Benefits Discussed  Screening Current   Cholesterol Screening Once every 5 years if you don't have a lipid disorder. May order more often based on risk factors. Lipid panel: 02/21/2024  Screening Current  Risks and Benefits Discussed  History Lipid Disorder      Other Preventive Screenings Covered by Medicare:  Abdominal Aortic Aneurysm (AAA) Screening: covered once if your at risk. You're considered to be at risk if you have a family history of AAA or a male between the age of 65-75 who smoking at least 100 cigarettes in your lifetime.  Lung Cancer Screening: covers low dose CT scan once per year if you meet all of the following conditions: (1) Age 55-77; (2) No signs or symptoms of lung cancer; (3) Current smoker or have quit smoking within the last 15 years; (4) You have a tobacco smoking history of at least 20 pack years (packs per day x number of years you smoked); (5) You get a written order from a healthcare provider.  Glaucoma Screening: covered annually if you're considered high risk: (1) You have diabetes OR (2) Family history of glaucoma OR (3)  aged 50 and older OR (4)  American aged 65 and older  Osteoporosis Screening: covered every 2 years if you meet one of the following conditions: (1) Have a vertebral abnormality; (2) On glucocorticoid therapy for more than 3 months; (3) Have primary hyperparathyroidism; (4) On osteoporosis medications and need to assess response to drug therapy.  HIV Screening: covered annually if you're between the age of 15-65. Also covered annually if you are younger than 15 and older than 65 with risk factors for HIV infection. For pregnant patients, it is covered up  to 3 times per pregnancy.    Immunizations:  Immunization Recommendations   Influenza Vaccine Annual influenza vaccination during flu season is recommended for all persons aged >= 6 months who do not have contraindications   Pneumococcal Vaccine   * Pneumococcal conjugate vaccine = PCV13 (Prevnar 13), PCV15 (Vaxneuvance), PCV20 (Prevnar 20)  * Pneumococcal polysaccharide vaccine = PPSV23 (Pneumovax) Adults 19-65 yo with certain risk factors or if 65+ yo  If never received any pneumonia vaccine: recommend Prevnar 20 (PCV20)  Give PCV20 if previously received 1 dose of PCV13 or PPSV23   Hepatitis B Vaccine 3 dose series if at intermediate or high risk (ex: diabetes, end stage renal disease, liver disease)   Respiratory syncytial virus (RSV) Vaccine - COVERED BY MEDICARE PART D  * RSVPreF3 (Arexvy) CDC recommends that adults 60 years of age and older may receive a single dose of RSV vaccine using shared clinical decision-making (SCDM)   Tetanus (Td) Vaccine - COST NOT COVERED BY MEDICARE PART B Following completion of primary series, a booster dose should be given every 10 years to maintain immunity against tetanus. Td may also be given as tetanus wound prophylaxis.   Tdap Vaccine - COST NOT COVERED BY MEDICARE PART B Recommended at least once for all adults. For pregnant patients, recommended with each pregnancy.   Shingles Vaccine (Shingrix) - COST NOT COVERED BY MEDICARE PART B  2 shot series recommended in those 19 years and older who have or will have weakened immune systems or those 50 years and older     Health Maintenance Due:      Topic Date Due   • Hepatitis C Screening  Completed   • Lung Cancer Screening  Discontinued     Immunizations Due:      Topic Date Due   • COVID-19 Vaccine (8 - 2023-24 season) 11/27/2023     Advance Directives   What are advance directives?  Advance directives are legal documents that state your wishes and plans for medical care. These plans are made ahead of time in case you lose  your ability to make decisions for yourself. Advance directives can apply to any medical decision, such as the treatments you want, and if you want to donate organs.   What are the types of advance directives?  There are many types of advance directives, and each state has rules about how to use them. You may choose a combination of any of the following:  Living will:  This is a written record of the treatment you want. You can also choose which treatments you do not want, which to limit, and which to stop at a certain time. This includes surgery, medicine, IV fluid, and tube feedings.   Durable power of  for healthcare (DPAHC):  This is a written record that states who you want to make healthcare choices for you when you are unable to make them for yourself. This person, called a proxy, is usually a family member or a friend. You may choose more than 1 proxy.  Do not resuscitate (DNR) order:  A DNR order is used in case your heart stops beating or you stop breathing. It is a request not to have certain forms of treatment, such as CPR. A DNR order may be included in other types of advance directives.  Medical directive:  This covers the care that you want if you are in a coma, near death, or unable to make decisions for yourself. You can list the treatments you want for each condition. Treatment may include pain medicine, surgery, blood transfusions, dialysis, IV or tube feedings, and a ventilator (breathing machine).  Values history:  This document has questions about your views, beliefs, and how you feel and think about life. This information can help others choose the care that you would choose.  Why are advance directives important?  An advance directive helps you control your care. Although spoken wishes may be used, it is better to have your wishes written down. Spoken wishes can be misunderstood, or not followed. Treatments may be given even if you do not want them. An advance directive may make it  easier for your family to make difficult choices about your care.       © Copyright [employer] 2018 Information is for End User's use only and may not be sold, redistributed or otherwise used for commercial purposes. All illustrations and images included in CareNotes® are the copyrighted property of [employer] or [employer]

## 2024-05-21 NOTE — ASSESSMENT & PLAN NOTE
Currently NSR and rage controlled with Toprol, on ASA only for stroke prevention, just saw Cardio, call with CV symptoms

## 2024-05-21 NOTE — PROGRESS NOTES
Ambulatory Visit  Name: Gabe Pickard      : 1945      MRN: [id number]  Encounter Provider: Radha Pettit DO  Encounter Date: 2024   Encounter department: [facility] [address]     Assessment & Plan   1. Type 2 diabetes mellitus without complication, without long-term current use of insulin (HCC)  Assessment & Plan:    Lab Results   Component Value Date    HGBA1C 5.9 (H) 2024   DM type 2 w/o complications - controlled with A1C 5.9 -  just saw Endo in Feb, noting a lot of loose stools, will reach out to Endo about decreasing Metformin, cont' BW as per Endo, DM foot exam done in office today, DM eye exam , on a statin and ASA, will follow  2. Paroxysmal atrial fibrillation (HCC)  Assessment & Plan:  Currently NSR and rage controlled with Toprol, on ASA only for stroke prevention, just saw Cardio, call with CV symptoms  3. Coronary artery disease involving native heart with angina pectoris, unspecified vessel or lesion type (HCC)  Assessment & Plan:  Currently w/o CV symptoms, on beta blocker/statin/ASA, just saw Cardio, call with CV concerns  4. CVID (common variable immunodeficiency) (HCC)  Assessment & Plan:  Doing well with monthly IV IgG, notes no recent illnesses  5. Multiple myeloma not having achieved remission (HCC)  Assessment & Plan:  On monthly Velcade and has routine BW, con't tx and f/u as per Heme/Onc  6. Platelets decreased (HCC)  Assessment & Plan:  Stable, monitored frequently with Heme/Onc, call with significant bleeding/bruising, on ASA, will follow  7. Dark stools  Comments:  heme neg today, likely d/t Pepto and PO iron, will reach out to Endo about decreasing Metformin d/t diarrhea, pt questioning EPI - may need GI if not better  Orders:  -     POCT hemoccult screening  8. Medicare annual wellness visit, subsequent     Colonoscopy  - 5 yrs    BW  (A1C 5.9)  DM foot exam today in office  DM eye exam  - 2 yrs      Preventive health  issues were discussed with patient, and age appropriate screening tests were ordered as noted in patient's After Visit Summary. Personalized health advice and appropriate referrals for health education or preventive services given if needed, as noted in patient's After Visit Summary.    History of Present Illness     HPI  Pt here for follow up appt/BW results and AWV    BW results from Feb 24 were reviewed with pt in detail: FBS/A1C 95/5.9, Alk Phos 124, rest of CMP & urine microalbumin: cr ratio were wnl, FLP with HLD 31 but TC/LDL at goal     Def of controlled vs uncontrolled DM was reviewed.  Diet was reviewed - wgt up 3 lbs from Nov 23.  He is taking his DM meds as directed by Endo - OV note from Feb 24 was reviewed.  He is UTD on DM eye exam (8/23 - 2 yrs) and is due for his DM foot exam this month.  He notes chronic numbness at both feet but denies pain/sores/ulcers.  He is on statin and ASA but no ACE/ARB.     Pt saw Cardio (Dr. Garcia) last week for CAD/PAF f/u - OV note reviewed.  Recent PET  had noted severe aortic atherosclerosis. This was reviewed by Cardio. He notes no Cp/palp/LE edema/SOB/LE edema.  Echo 5/22 showed bioprosthetic aortic valve functioning normally. NO meds were changed and no studies ordered. He was told to f/u in 1 yr.    Pt con't to follow with Heme/Onc (Dr. Farris) for f/u MM w/plasmcytoma and CVID - OV note from Dec 23 reviewed. His counts remain stable. He is on IV IgG monthly as well as Velcade monthly. He has routine BW. He does bruise easily. He notes no recent illnesses.     He has had some loose, black stools for months.  He notes he used to have intermittent nml stools but now that is gone and all stools are loose.  He is using Pepto.  He is on PO iron.   He notes stools do look sticky. He will have some BETTIE abd pain just before he has the loose stools. He notes no BRBPR.  He notes no N/V/F/C.  He notes no wgt loss but admits appetite is poor.           Patient Care  Team:  Radha Pettit DO as PCP - General (Internal Medicine)  Lucita La MD as PCP - Endocrinology (Endocrinology)  MD Heladio Morris MD (Cardiology)  DUNG Medrano as Nurse Practitioner (Gastroenterology)  Brent Farris MD (Hematology and Oncology)    Review of Systems   Constitutional:  Positive for appetite change and fatigue. Negative for chills, fever and unexpected weight change.   HENT:  Negative for congestion and sore throat.    Eyes:  Negative for pain and visual disturbance.   Respiratory:  Negative for cough and shortness of breath.    Cardiovascular:  Negative for chest pain, palpitations and leg swelling.   Gastrointestinal:  Positive for abdominal pain and diarrhea. Negative for blood in stool, constipation, nausea and vomiting.   Genitourinary:  Negative for difficulty urinating and dysuria.   Musculoskeletal:  Negative for arthralgias and myalgias.   Skin:  Negative for rash and wound.   Neurological:  Negative for dizziness and headaches.   Hematological:  Bruises/bleeds easily.   Psychiatric/Behavioral:  Negative for confusion and dysphoric mood.      Medical History Reviewed by provider this encounter:  Tobacco  Allergies  Meds  Problems  Med Hx  Surg Hx  Fam Hx       Annual Wellness Visit Questionnaire   Gabe is here for his Subsequent Wellness visit.     Health Risk Assessment:   Patient rates overall health as fair. Patient feels that their physical health rating is slightly better. Patient is satisfied with their life. Eyesight was rated as same. Hearing was rated as same. Patient feels that their emotional and mental health rating is same. Patients states they are never, rarely angry. Patient states they are sometimes unusually tired/fatigued. Pain experienced in the last 7 days has been some. Patient's pain rating has been 5/10. Patient states that he has experienced no weight loss or gain in last 6 months.     Depression Screening:    PHQ-2 Score: 0      Fall Risk Screening:   In the past year, patient has experienced: no history of falling in past year      Home Safety:  Patient does not have trouble with stairs inside or outside of their home. Patient has working smoke alarms and has working carbon monoxide detector. Home safety hazards include: none.     Nutrition:   Current diet is Regular.     Medications:   Patient is currently taking over-the-counter supplements. OTC medications include: see medication list. Patient is able to manage medications.     Activities of Daily Living (ADLs)/Instrumental Activities of Daily Living (IADLs):   Walk and transfer into and out of bed and chair?: Yes  Dress and groom yourself?: Yes    Bathe or shower yourself?: Yes    Feed yourself? Yes  Do your laundry/housekeeping?: Yes  Manage your money, pay your bills and track your expenses?: Yes  Make your own meals?: Yes    Do your own shopping?: Yes    Previous Hospitalizations:   Any hospitalizations or ED visits within the last 12 months?: Yes    How many hospitalizations have you had in the last year?: 1-2    Hospitalization Comments: Sepsis without acute organ dysfunction     Advance Care Planning:   Living will: Yes    Durable POA for healthcare: Yes    Advanced directive: Yes      Cognitive Screening:   Provider or family/friend/caregiver concerned regarding cognition?: No    PREVENTIVE SCREENINGS      Cardiovascular Screening:    General: Screening Current, Risks and Benefits Discussed and History Lipid Disorder      Diabetes Screening:     General: History Diabetes, Risks and Benefits Discussed and Screening Current      Colorectal Cancer Screening:     General: History Colorectal Cancer and Risks and Benefits Discussed      Prostate Cancer Screening:    General: Screening Not Indicated and Risks and Benefits Discussed      Osteoporosis Screening:    General: Risks and Benefits Discussed    Due for: DXA Axial      Abdominal Aortic Aneurysm (AAA)  "Screening:    Risk factors include: tobacco use        General: Risks and Benefits Discussed and Screening Current      Lung Cancer Screening:     General: Risks and Benefits Discussed and Screening Current      Hepatitis C Screening:    General: Screening Current and Risks and Benefits Discussed    Screening, Brief Intervention, and Referral to Treatment (SBIRT)    Screening      Single Item Drug Screening:  How often have you used an illegal drug (including marijuana) or a prescription medication for non-medical reasons in the past year? never    Single Item Drug Screen Score: 0  Interpretation: Negative screen for possible drug use disorder    Other Counseling Topics:   Car/seat belt/driving safety, sunscreen and regular weightbearing exercise.     Social Determinants of Health     Financial Resource Strain: Low Risk  (5/15/2023)    Overall Financial Resource Strain (CARDIA)     Difficulty of Paying Living Expenses: Not hard at all   Food Insecurity: No Food Insecurity (5/21/2024)    Hunger Vital Sign     Worried About Running Out of Food in the Last Year: Never true     Ran Out of Food in the Last Year: Never true   Transportation Needs: No Transportation Needs (5/21/2024)    PRAPARE - Transportation     Lack of Transportation (Medical): No     Lack of Transportation (Non-Medical): No   Housing Stability: Low Risk  (5/21/2024)    Housing Stability Vital Sign     Unable to Pay for Housing in the Last Year: No     Number of Times Moved in the Last Year: 0     Homeless in the Last Year: No   Utilities: Not At Risk (5/21/2024)    [facility] Utilities     Threatened with loss of utilities: No     Vision Screening    Right eye Left eye Both eyes   Without correction      With correction 20/40 20/40 20/30       Objective     /58   Pulse 67   Temp 97.5 °F (36.4 °C)   Ht 5' 10\" (1.778 m)   Wt 71.8 kg (158 lb 3.2 oz)   SpO2 97%   BMI 22.70 kg/m²     Physical Exam  Vitals and nursing note reviewed.   Constitutional:  "      General: He is not in acute distress.     Appearance: He is well-developed. He is not ill-appearing.   HENT:      Head: Normocephalic and atraumatic.      Right Ear: Tympanic membrane and external ear normal. There is no impacted cerumen.      Left Ear: Tympanic membrane and external ear normal. There is no impacted cerumen.   Eyes:      General:         Right eye: No discharge.         Left eye: No discharge.      Conjunctiva/sclera: Conjunctivae normal.   Neck:      Trachea: No tracheal deviation.   Cardiovascular:      Rate and Rhythm: Normal rate and regular rhythm.      Pulses: no weak pulses.           Dorsalis pedis pulses are 2+ on the right side and 2+ on the left side.      Heart sounds: Murmur heard.   Pulmonary:      Effort: Pulmonary effort is normal. No respiratory distress.      Breath sounds: Normal breath sounds. No wheezing, rhonchi or rales.   Abdominal:      General: There is no distension.      Palpations: Abdomen is soft.      Tenderness: There is no abdominal tenderness. There is no guarding or rebound.   Genitourinary:     Rectum: Guaiac result negative.   Musculoskeletal:      Cervical back: Neck supple.   Feet:      Right foot:      Skin integrity: Callus present. No ulcer, skin breakdown, erythema, warmth or dry skin.      Left foot:      Skin integrity: Callus present. No ulcer, skin breakdown, erythema, warmth or dry skin.   Lymphadenopathy:      Cervical: No cervical adenopathy.   Skin:     General: Skin is warm and dry.      Coloration: Skin is not pale.      Findings: No rash.   Neurological:      General: No focal deficit present.      Mental Status: He is alert. Mental status is at baseline.      Motor: No abnormal muscle tone.      Gait: Gait normal.   Psychiatric:         Mood and Affect: Mood normal.         Behavior: Behavior normal.         Thought Content: Thought content normal.         Judgment: Judgment normal.     Diabetic Foot Exam    Patient's shoes and socks  removed.    Right Foot/Ankle   Right Foot Inspection  Skin Exam: skin normal, skin intact, callus and callus. No dry skin, no warmth, no erythema, no maceration, no abnormal color, no pre-ulcer and no ulcer.     Toe Exam: ROM and strength within normal limits.     Sensory   Vibration: intact  Monofilament testing: intact    Vascular  The right DP pulse is 2+.     Left Foot/Ankle  Left Foot Inspection  Skin Exam: skin normal, skin intact and callus. No dry skin, no warmth, no erythema, no maceration, normal color, no pre-ulcer and no ulcer.     Toe Exam: ROM and strength within normal limits.     Sensory   Vibration: intact  Monofilament testing: intact    Vascular  The left DP pulse is 2+.     Assign Risk Category  No deformity present  No loss of protective sensation  No weak pulses  Risk: 0    Administrative Statements

## 2024-05-21 NOTE — ASSESSMENT & PLAN NOTE
Stable, monitored frequently with Heme/Onc, call with significant bleeding/bruising, on ASA, will follow

## 2024-05-22 ENCOUNTER — TELEPHONE (OUTPATIENT)
Dept: FAMILY MEDICINE CLINIC | Facility: HOSPITAL | Age: 79
End: 2024-05-22

## 2024-05-22 NOTE — TELEPHONE ENCOUNTER
----- Message from Radha Pettit DO sent at 5/22/2024  1:44 PM EDT -----  Please notify pt that Dr. La feels it is reasonable to decrease his Metformin and see if it helps with his diarrhea - decrease Metformin XR from 500 mg 3 tab in am to 2 tab in am.  Con't Imodium as discussed at appt on Tuesday.  If not better in 2 wks can decrease Metformin too 1 tab in am.  If not better then needs to see GI.  ----- Message -----  From: Lucita La MD  Sent: 5/22/2024   1:16 PM EDT  To: Radha Pettit DO    Hey dr Pettit, ya that's reasonable. I do no think its the metformin but we can def try. I have no issues with it.     Lucita  ----- Message -----  From: Radha Pettit DO  Sent: 5/21/2024   4:51 PM EDT  To: MD Dr. Bessie Ramos,    I saw Rafael today for f/u.  He was noting a lot of loose stools.  He is on Metformin  mg 3 tab PO q day.  His last A1C 2/24 was 5.9.  I'm not sure if the diarrhea is d/t the Metformin but would you be OK if we decreased dose a bit to see if it helped his diarrhea?  He googled his symptoms and thinks he has EPI.  I advised  him to see about decreasing Metformin before we do further GI w/u for EPI.  I can let pt know how to decrease the Metformin XR it if you agree.    Radha Pettit

## 2024-05-26 DIAGNOSIS — K21.00 GASTROESOPHAGEAL REFLUX DISEASE WITH ESOPHAGITIS: ICD-10-CM

## 2024-05-26 DIAGNOSIS — R13.19 ESOPHAGEAL DYSPHAGIA: ICD-10-CM

## 2024-05-26 DIAGNOSIS — K22.2 SCHATZKI'S RING OF DISTAL ESOPHAGUS: ICD-10-CM

## 2024-05-26 DIAGNOSIS — K22.70 BARRETT'S ESOPHAGUS WITHOUT DYSPLASIA: ICD-10-CM

## 2024-05-26 RX ORDER — OMEPRAZOLE 40 MG/1
40 CAPSULE, DELAYED RELEASE ORAL DAILY
Qty: 90 CAPSULE | Refills: 1 | Status: SHIPPED | OUTPATIENT
Start: 2024-05-26

## 2024-05-31 NOTE — PROCEDURE: MOHS SURGERY
[General Appearance - In No Acute Distress] : in no acute distress [General Appearance - Well-Appearing] : well appearing Dressing (No Sutures): steri-strips and pressure dressing

## 2024-06-12 ENCOUNTER — APPOINTMENT (INPATIENT)
Dept: RADIOLOGY | Facility: HOSPITAL | Age: 79
DRG: 177 | End: 2024-06-12
Payer: COMMERCIAL

## 2024-06-12 ENCOUNTER — APPOINTMENT (EMERGENCY)
Dept: RADIOLOGY | Facility: HOSPITAL | Age: 79
DRG: 177 | End: 2024-06-12
Payer: COMMERCIAL

## 2024-06-12 ENCOUNTER — HOSPITAL ENCOUNTER (INPATIENT)
Facility: HOSPITAL | Age: 79
LOS: 3 days | Discharge: HOME | DRG: 177 | End: 2024-06-15
Attending: EMERGENCY MEDICINE | Admitting: STUDENT IN AN ORGANIZED HEALTH CARE EDUCATION/TRAINING PROGRAM
Payer: COMMERCIAL

## 2024-06-12 DIAGNOSIS — R13.10 DYSPHAGIA, UNSPECIFIED TYPE: ICD-10-CM

## 2024-06-12 DIAGNOSIS — J69.0 ASPIRATION PNEUMONIA OF RIGHT UPPER LOBE DUE TO REGURGITATED FOOD (HCC): ICD-10-CM

## 2024-06-12 DIAGNOSIS — R06.89 ACUTE RESPIRATORY INSUFFICIENCY: ICD-10-CM

## 2024-06-12 DIAGNOSIS — J18.9 MULTIFOCAL PNEUMONIA: Primary | ICD-10-CM

## 2024-06-12 LAB
2HR DELTA HS TROPONIN: -4 NG/L
4HR DELTA HS TROPONIN: -3 NG/L
ALBUMIN SERPL BCP-MCNC: 3.9 G/DL (ref 3.5–5)
ALP SERPL-CCNC: 114 U/L (ref 34–104)
ALT SERPL W P-5'-P-CCNC: 21 U/L (ref 7–52)
ANION GAP SERPL CALCULATED.3IONS-SCNC: 11 MMOL/L (ref 4–13)
APTT PPP: 29 SECONDS (ref 23–37)
AST SERPL W P-5'-P-CCNC: 29 U/L (ref 13–39)
BASOPHILS # BLD AUTO: 0.07 THOUSANDS/ÂΜL (ref 0–0.1)
BASOPHILS NFR BLD AUTO: 0 % (ref 0–1)
BILIRUB SERPL-MCNC: 0.7 MG/DL (ref 0.2–1)
BNP SERPL-MCNC: 562 PG/ML (ref 0–100)
BUN SERPL-MCNC: 14 MG/DL (ref 5–25)
CALCIUM SERPL-MCNC: 9.4 MG/DL (ref 8.4–10.2)
CARDIAC TROPONIN I PNL SERPL HS: 24 NG/L
CARDIAC TROPONIN I PNL SERPL HS: 25 NG/L
CARDIAC TROPONIN I PNL SERPL HS: 28 NG/L
CHLORIDE SERPL-SCNC: 100 MMOL/L (ref 96–108)
CO2 SERPL-SCNC: 28 MMOL/L (ref 21–32)
CREAT SERPL-MCNC: 0.95 MG/DL (ref 0.6–1.3)
EOSINOPHIL # BLD AUTO: 0.02 THOUSAND/ÂΜL (ref 0–0.61)
EOSINOPHIL NFR BLD AUTO: 0 % (ref 0–6)
ERYTHROCYTE [DISTWIDTH] IN BLOOD BY AUTOMATED COUNT: 14.4 % (ref 11.6–15.1)
EST. AVERAGE GLUCOSE BLD GHB EST-MCNC: 120 MG/DL
FLUAV RNA RESP QL NAA+PROBE: NEGATIVE
FLUBV RNA RESP QL NAA+PROBE: NEGATIVE
GFR SERPL CREATININE-BSD FRML MDRD: 76 ML/MIN/1.73SQ M
GLUCOSE SERPL-MCNC: 109 MG/DL (ref 65–140)
GLUCOSE SERPL-MCNC: 175 MG/DL (ref 65–140)
HBA1C MFR BLD: 5.8 %
HCT VFR BLD AUTO: 42.2 % (ref 36.5–49.3)
HGB BLD-MCNC: 13.9 G/DL (ref 12–17)
IMM GRANULOCYTES # BLD AUTO: 0.14 THOUSAND/UL (ref 0–0.2)
IMM GRANULOCYTES NFR BLD AUTO: 1 % (ref 0–2)
INR PPP: 1.09 (ref 0.84–1.19)
L PNEUMO1 AG UR QL IA.RAPID: NEGATIVE
LACTATE SERPL-SCNC: 1.5 MMOL/L (ref 0.5–2)
LYMPHOCYTES # BLD AUTO: 0.94 THOUSANDS/ÂΜL (ref 0.6–4.47)
LYMPHOCYTES NFR BLD AUTO: 5 % (ref 14–44)
MCH RBC QN AUTO: 28.6 PG (ref 26.8–34.3)
MCHC RBC AUTO-ENTMCNC: 32.9 G/DL (ref 31.4–37.4)
MCV RBC AUTO: 87 FL (ref 82–98)
MONOCYTES # BLD AUTO: 1.24 THOUSAND/ÂΜL (ref 0.17–1.22)
MONOCYTES NFR BLD AUTO: 7 % (ref 4–12)
NEUTROPHILS # BLD AUTO: 15.59 THOUSANDS/ÂΜL (ref 1.85–7.62)
NEUTS SEG NFR BLD AUTO: 87 % (ref 43–75)
NRBC BLD AUTO-RTO: 0 /100 WBCS
PLATELET # BLD AUTO: 174 THOUSANDS/UL (ref 149–390)
PMV BLD AUTO: 9.8 FL (ref 8.9–12.7)
POTASSIUM SERPL-SCNC: 3.3 MMOL/L (ref 3.5–5.3)
PROCALCITONIN SERPL-MCNC: 0.4 NG/ML
PROT SERPL-MCNC: 6.8 G/DL (ref 6.4–8.4)
PROTHROMBIN TIME: 14.6 SECONDS (ref 11.6–14.5)
RBC # BLD AUTO: 4.86 MILLION/UL (ref 3.88–5.62)
RSV RNA RESP QL NAA+PROBE: NEGATIVE
S PNEUM AG UR QL: NEGATIVE
SARS-COV-2 RNA RESP QL NAA+PROBE: NEGATIVE
SODIUM SERPL-SCNC: 139 MMOL/L (ref 135–147)
WBC # BLD AUTO: 18 THOUSAND/UL (ref 4.31–10.16)

## 2024-06-12 PROCEDURE — 0241U HB NFCT DS VIR RESP RNA 4 TRGT: CPT | Performed by: EMERGENCY MEDICINE

## 2024-06-12 PROCEDURE — 80053 COMPREHEN METABOLIC PANEL: CPT | Performed by: EMERGENCY MEDICINE

## 2024-06-12 PROCEDURE — 87449 NOS EACH ORGANISM AG IA: CPT | Performed by: INTERNAL MEDICINE

## 2024-06-12 PROCEDURE — 99285 EMERGENCY DEPT VISIT HI MDM: CPT | Performed by: EMERGENCY MEDICINE

## 2024-06-12 PROCEDURE — 96365 THER/PROPH/DIAG IV INF INIT: CPT

## 2024-06-12 PROCEDURE — 84145 PROCALCITONIN (PCT): CPT | Performed by: EMERGENCY MEDICINE

## 2024-06-12 PROCEDURE — 99223 1ST HOSP IP/OBS HIGH 75: CPT | Performed by: INTERNAL MEDICINE

## 2024-06-12 PROCEDURE — 85025 COMPLETE CBC W/AUTO DIFF WBC: CPT | Performed by: EMERGENCY MEDICINE

## 2024-06-12 PROCEDURE — 92611 MOTION FLUOROSCOPY/SWALLOW: CPT

## 2024-06-12 PROCEDURE — 85730 THROMBOPLASTIN TIME PARTIAL: CPT | Performed by: EMERGENCY MEDICINE

## 2024-06-12 PROCEDURE — 83036 HEMOGLOBIN GLYCOSYLATED A1C: CPT | Performed by: INTERNAL MEDICINE

## 2024-06-12 PROCEDURE — 36415 COLL VENOUS BLD VENIPUNCTURE: CPT | Performed by: EMERGENCY MEDICINE

## 2024-06-12 PROCEDURE — 99285 EMERGENCY DEPT VISIT HI MDM: CPT

## 2024-06-12 PROCEDURE — 92610 EVALUATE SWALLOWING FUNCTION: CPT

## 2024-06-12 PROCEDURE — 82948 REAGENT STRIP/BLOOD GLUCOSE: CPT

## 2024-06-12 PROCEDURE — 94760 N-INVAS EAR/PLS OXIMETRY 1: CPT

## 2024-06-12 PROCEDURE — 92526 ORAL FUNCTION THERAPY: CPT

## 2024-06-12 PROCEDURE — 83605 ASSAY OF LACTIC ACID: CPT | Performed by: EMERGENCY MEDICINE

## 2024-06-12 PROCEDURE — 93005 ELECTROCARDIOGRAM TRACING: CPT

## 2024-06-12 PROCEDURE — 85610 PROTHROMBIN TIME: CPT | Performed by: EMERGENCY MEDICINE

## 2024-06-12 PROCEDURE — 87040 BLOOD CULTURE FOR BACTERIA: CPT | Performed by: EMERGENCY MEDICINE

## 2024-06-12 PROCEDURE — 94664 DEMO&/EVAL PT USE INHALER: CPT

## 2024-06-12 PROCEDURE — 71045 X-RAY EXAM CHEST 1 VIEW: CPT

## 2024-06-12 PROCEDURE — 84484 ASSAY OF TROPONIN QUANT: CPT | Performed by: EMERGENCY MEDICINE

## 2024-06-12 PROCEDURE — 74230 X-RAY XM SWLNG FUNCJ C+: CPT

## 2024-06-12 PROCEDURE — 83880 ASSAY OF NATRIURETIC PEPTIDE: CPT | Performed by: EMERGENCY MEDICINE

## 2024-06-12 RX ORDER — HEPARIN SODIUM 5000 [USP'U]/ML
5000 INJECTION, SOLUTION INTRAVENOUS; SUBCUTANEOUS EVERY 8 HOURS SCHEDULED
Status: DISCONTINUED | OUTPATIENT
Start: 2024-06-12 | End: 2024-06-15 | Stop reason: HOSPADM

## 2024-06-12 RX ORDER — INSULIN LISPRO 100 [IU]/ML
1-5 INJECTION, SOLUTION INTRAVENOUS; SUBCUTANEOUS
Status: DISCONTINUED | OUTPATIENT
Start: 2024-06-12 | End: 2024-06-15 | Stop reason: HOSPADM

## 2024-06-12 RX ORDER — PANTOPRAZOLE SODIUM 40 MG/1
40 TABLET, DELAYED RELEASE ORAL
Status: DISCONTINUED | OUTPATIENT
Start: 2024-06-12 | End: 2024-06-15 | Stop reason: HOSPADM

## 2024-06-12 RX ORDER — CEFEPIME HYDROCHLORIDE 2 G/50ML
2000 INJECTION, SOLUTION INTRAVENOUS ONCE
Status: COMPLETED | OUTPATIENT
Start: 2024-06-12 | End: 2024-06-12

## 2024-06-12 RX ORDER — ATORVASTATIN CALCIUM 40 MG/1
40 TABLET, FILM COATED ORAL
Status: DISCONTINUED | OUTPATIENT
Start: 2024-06-12 | End: 2024-06-15 | Stop reason: HOSPADM

## 2024-06-12 RX ORDER — GUAIFENESIN 600 MG/1
600 TABLET, EXTENDED RELEASE ORAL 2 TIMES DAILY
Status: DISCONTINUED | OUTPATIENT
Start: 2024-06-12 | End: 2024-06-15 | Stop reason: HOSPADM

## 2024-06-12 RX ORDER — VALACYCLOVIR HYDROCHLORIDE 500 MG/1
500 TABLET, FILM COATED ORAL DAILY
Status: DISCONTINUED | OUTPATIENT
Start: 2024-06-12 | End: 2024-06-15 | Stop reason: HOSPADM

## 2024-06-12 RX ORDER — FERROUS SULFATE 325(65) MG
325 TABLET ORAL
Status: DISCONTINUED | OUTPATIENT
Start: 2024-06-13 | End: 2024-06-15 | Stop reason: HOSPADM

## 2024-06-12 RX ORDER — POTASSIUM CHLORIDE 20 MEQ/1
40 TABLET, EXTENDED RELEASE ORAL ONCE
Status: COMPLETED | OUTPATIENT
Start: 2024-06-12 | End: 2024-06-12

## 2024-06-12 RX ORDER — METOPROLOL SUCCINATE 50 MG/1
100 TABLET, EXTENDED RELEASE ORAL DAILY
Status: DISCONTINUED | OUTPATIENT
Start: 2024-06-12 | End: 2024-06-15 | Stop reason: HOSPADM

## 2024-06-12 RX ADMIN — GUAIFENESIN 600 MG: 600 TABLET ORAL at 13:21

## 2024-06-12 RX ADMIN — ASPIRIN 81 MG: 81 TABLET, COATED ORAL at 12:31

## 2024-06-12 RX ADMIN — PANTOPRAZOLE SODIUM 40 MG: 40 TABLET, DELAYED RELEASE ORAL at 12:31

## 2024-06-12 RX ADMIN — HEPARIN SODIUM 5000 UNITS: 5000 INJECTION, SOLUTION INTRAVENOUS; SUBCUTANEOUS at 22:27

## 2024-06-12 RX ADMIN — GUAIFENESIN 600 MG: 600 TABLET ORAL at 17:22

## 2024-06-12 RX ADMIN — MULTIPLE VITAMINS W/ MINERALS TAB 1 TABLET: TAB ORAL at 12:32

## 2024-06-12 RX ADMIN — AMPICILLIN SODIUM AND SULBACTAM SODIUM 3 G: 2; 1 INJECTION, POWDER, FOR SOLUTION INTRAMUSCULAR; INTRAVENOUS at 16:19

## 2024-06-12 RX ADMIN — POTASSIUM CHLORIDE 40 MEQ: 1500 TABLET, EXTENDED RELEASE ORAL at 07:41

## 2024-06-12 RX ADMIN — METOPROLOL SUCCINATE 100 MG: 50 TABLET, EXTENDED RELEASE ORAL at 12:32

## 2024-06-12 RX ADMIN — VANCOMYCIN HYDROCHLORIDE 1750 MG: 1 INJECTION, POWDER, LYOPHILIZED, FOR SOLUTION INTRAVENOUS at 07:52

## 2024-06-12 RX ADMIN — CEFEPIME HYDROCHLORIDE 2000 MG: 2 INJECTION, SOLUTION INTRAVENOUS at 07:20

## 2024-06-12 RX ADMIN — AMPICILLIN SODIUM AND SULBACTAM SODIUM 3 G: 2; 1 INJECTION, POWDER, FOR SOLUTION INTRAMUSCULAR; INTRAVENOUS at 22:26

## 2024-06-12 RX ADMIN — SODIUM CHLORIDE 500 ML: 0.9 INJECTION, SOLUTION INTRAVENOUS at 07:43

## 2024-06-12 RX ADMIN — HEPARIN SODIUM 5000 UNITS: 5000 INJECTION, SOLUTION INTRAVENOUS; SUBCUTANEOUS at 13:21

## 2024-06-12 RX ADMIN — VALACYCLOVIR HYDROCHLORIDE 500 MG: 500 TABLET, FILM COATED ORAL at 13:21

## 2024-06-12 RX ADMIN — ATORVASTATIN CALCIUM 40 MG: 40 TABLET, FILM COATED ORAL at 17:19

## 2024-06-12 NOTE — H&P
"[facility]  H&P  Name: Gabe Pickard 78 y.o. male I MRN: [id number]  Unit/Bed#: -01 I Date of Admission: 6/12/2024   Date of Service: 6/12/2024 I Hospital Day: 0      Assessment & Plan   * Acute respiratory failure with hypoxia (HCC)  Assessment & Plan  Oxygen requirement at time of admission: 2l - Continue to wean as tolerated  Secondary to: Pneumonia  COVID/flu/RSV negative   mild elevation  Continue to treat underlying cause, see further management below  Monitor vital signs, SpO2  Plan as in pneumonia    Dysphagia  Assessment & Plan  VBS ordered  Reviewed findings today  Will discuss with family and patient regarding diet modifications/alternate forms of nutrition    Pneumonia  Assessment & Plan  Senting with shortness of breath, fever chills, cough  History of dysphagia  Leukocytosis, RR 20, requiring 2 L of oxygen  S/p cefepime and vancomycin in ED  Check urine strep, Legionella antigen  Sputum, blood cultures  Will start ceftriaxone  Speech eval    Chronic diastolic congestive heart failure (HCC)  Assessment & Plan  Wt Readings from Last 3 Encounters:   05/21/24 71.8 kg (158 lb 3.2 oz)   05/15/24 70.8 kg (156 lb)   02/28/24 72.3 kg (159 lb 6.4 oz)     Appears dry to euvolemic  Monitor weights, I's and O's  Hold Lasix today.  Patient appears dry  Evaluate for tomorrow          Multiple myeloma not having achieved remission ([facility])  Assessment & Plan  Currently receiving chemo once a month, IVIG once a month  Continue follow-up with outpatient oncology    Type 2 diabetes mellitus without complication, without long-term current use of insulin ([facility])  Assessment & Plan  Lab Results   Component Value Date    HGBA1C 5.9 (H) 02/21/2024       No results for input(s): \"POCGLU\" in the last 72 hours.    Blood Sugar Average: Last 72 hrs:    On metformin, Jardiance at home  SSI  Diabetic diet         VTE Pharmacologic Prophylaxis:   High Risk (Score >/= 5) - Pharmacological DVT " Prophylaxis Ordered: heparin. Sequential Compression Devices Ordered.  Code Status: Level 1 - Full Code discussed with patient  Discussion with family: Attempted to update  (wife) via phone. Unable to contact.    Anticipated Length of Stay: Patient will be admitted on an inpatient basis with an anticipated length of stay of greater than 2 midnights secondary to pna.    Total Time Spent on Date of Encounter in care of patient: 75 mins. This time was spent on one or more of the following: performing physical exam; counseling and coordination of care; obtaining or reviewing history; documenting in the medical record; reviewing/ordering tests, medications or procedures; communicating with other healthcare professionals and discussing with patient's family/caregivers.    Chief Complaint:   Chief Complaint   Patient presents with    Shortness of Breath     Pt reports to er via walkin pt reports he thinks he has pneumonia   , pt reports he is getting chemo and had a pulse ox between 85-88        History of Present Illness:  Gabe Pickard is a 78 y.o. male with a PMH of multiple myeloma, hypertension, hyperlipidemia, diabetes, CHF who presents with shortness of breath, fever, chills since yesterday.  Patient stated he woke up in the middle of the night yesterday with shortness of breath, has productive cough, noticed fever with a Tmax of 100.8.  Denies nausea or vomiting.  Patient stated he has difficulty with swallowing.  Had multiple hospitalizations for pneumonia.     Patient will be admitted for pneumonia    Review of Systems:  Review of Systems   All other systems reviewed and are negative.      Past Medical and Surgical History:   Past Medical History:   Diagnosis Date    Acute on chronic systolic (congestive) heart failure (HCC) 05/15/2024    Acute respiratory failure with hypoxia (HCC) 04/20/2022    Arthritis     Cancer (HCC)     Cataract     Colitis     Colon polyp     Fatty liver     GERD  (gastroesophageal reflux disease)     History of chemotherapy     History of radiation therapy     History of shingles 2018    History of transfusion     Hyperlipidemia     Sepsis without acute organ dysfunction (HCC) 02/04/2023    Shingles 2017       Past Surgical History:   Procedure Laterality Date    AORTIC VALVE REPLACEMENT      CARDIAC VALVE REPLACEMENT  2014    aorta    CATARACT EXTRACTION Bilateral     COLECTOMY      COLONOSCOPY  11/2019    Prior right hemicolectomy    CORONARY ARTERY BYPASS GRAFT      DENTAL SURGERY      JOINT REPLACEMENT  January 2019    left knee    KNEE SURGERY      LYMPH NODE BIOPSY  2003    LYMPHADENECTOMY      OTHER SURGICAL HISTORY      MAZE PROCEDURE    LA ARTHRP KNE CONDYLE&PLATU MEDIAL&LAT COMPARTMENTS Left 01/28/2019    Procedure: ARTHROPLASTY LEFT KNEE TOTAL;  Surgeon: Darell Delgado MD;  Location:  MAIN OR;  Service: Orthopedics    LA LARYNGOSCOPY DIRECT OPERATIVE W/BIOPSY Right 04/27/2022    Procedure: DIRECT LARYNGOSCOPY WITH BIOPSY RIGHT PHARYNX, POSSIBLE RIGHT NECK LYMPH NODE BIOPSY; FROZEN SECTION;  Surgeon: Kevin Hardin MD;  Location:  MAIN OR;  Service: ENT    SKIN BIOPSY      UPPER GASTROINTESTINAL ENDOSCOPY  11/2019    Schatzki ring    US GUIDED LYMPH NODE BIOPSY RIGHT  12/14/2021       Meds/Allergies:  Prior to Admission medications    Medication Sig Start Date End Date Taking? Authorizing Provider   aspirin 81 MG tablet Take 1 tablet (81 mg total) by mouth daily 3/29/19  Yes Heladio Garcia MD   atorvastatin (LIPITOR) 40 mg tablet TAKE 1 TABLET BY MOUTH EVERY DAY 7/21/23  Yes Heladio Garcia MD   cholecalciferol (VITAMIN D3) 1,000 units tablet Take 2 tablets (2,000 Units total) by mouth daily 4/11/21  Yes Nikia Perez, DO   dexamethasone (DECADRON) 4 mg tablet  5/12/23  Yes Historical Provider, MD   ferrous sulfate 324 (65 Fe) mg TAKE 1 TABLET BY MOUTH EVERY DAY WITH BREAKFAST 3/23/24  Yes Radha Pettit,    fluorouracil (EFUDEX) 5 % cream Apply topically 2 (two)  "times a day 10/18/23  Yes Historical Provider, MD   furosemide (LASIX) 20 mg tablet TAKE 1 TABLET BY MOUTH TWICE A DAY 3/23/24  Yes Radha Pettit DO   IMMUNE GLOBULIN, HUMAN, IV Inject 30,000 mg into a catheter in a vein   Yes Historical Provider, MD   Jardiance 25 MG TABS TAKE 1 TABLET BY MOUTH EVERY DAY IN THE MORNING 3/22/24  Yes Lucita La MD   metFORMIN (GLUCOPHAGE-XR) 500 mg 24 hr tablet TAKE 3 TABLETS (1,500 MG TOTAL) BY MOUTH DAILY WITH BREAKFAST 5/7/24  Yes Radha Pettit DO   metoprolol succinate (TOPROL-XL) 100 mg 24 hr tablet TAKE 1 TABLET BY MOUTH EVERY DAY 2/29/24  Yes Radha Pettit DO   montelukast (SINGULAIR) 10 mg tablet TAKE 1 TABLET BY MOUTH EVERY DAY 5/1/24  Yes Radha Pettit DO   multivitamin-minerals (CENTRUM ADULTS) tablet Take 1 tablet by mouth daily 4/13/21  Yes Nikia Perez,    omeprazole (PriLOSEC) 40 MG capsule TAKE 1 CAPSULE (40 MG TOTAL) BY MOUTH DAILY. 5/26/24  Yes Radha Pettit DO   valACYclovir (VALTREX) 500 mg tablet Take 500 mg by mouth daily 5/27/22  Yes Historical Provider, MD     I have reviewed home medications with patient personally.    Allergies:   Allergies   Allergen Reactions    Ceftin [Cefuroxime] Itching     However, has tolerated Cefazolin and Pip-Tazo since, which have different side chains. Be cautious with / avoid 2nd, 3rd, or 4th Gen Cephs that have similar side chains to Cefuroxime.    Lantus [Insulin Glargine] Itching       Social History:  Marital Status: /Civil Union   Occupation: Retired  Patient Pre-hospital Living Situation: Home  Patient Pre-hospital Level of Mobility: walks  Patient Pre-hospital Diet Restrictions: Diabetic diet, no modified dysphagia diet  Substance Use History:   Social History     Substance and Sexual Activity   Alcohol Use Yes    Alcohol/week: 2.0 standard drinks of alcohol    Types: 2 Cans of beer per week    Comment: very rare \"beer here and there\"     Social History     Tobacco Use   Smoking Status Former    " Current packs/day: 0.00    Average packs/day: 1 pack/day for 42.0 years (42.0 ttl pk-yrs)    Types: Cigarettes    Start date: 1967    Quit date: 1/1/2009    Years since quitting: 15.4   Smokeless Tobacco Never     Social History     Substance and Sexual Activity   Drug Use No       Family History:  Family History   Problem Relation Age of Onset    Heart block Family     Coronary artery disease Mother     Thrombosis Mother     Hypertension Mother     Arthritis Mother     Hyperlipidemia Mother     Diabetes Father     Hypertension Father     Arthritis Father     Sudden death Father         cardiac    Diabetes type II Father     Colon cancer Paternal Grandfather     Cancer Paternal Grandfather     Breast cancer Sister     Heart disease Brother         Coronary stents       Physical Exam:     Vitals:   Blood Pressure: 152/66 (06/12/24 1233)  Pulse: 78 (06/12/24 1233)  Temperature: 98.7 °F (37.1 °C) (06/12/24 0914)  Temp Source: Oral (06/12/24 0914)  Respirations: 20 (06/12/24 0914)  SpO2: 98 % (06/12/24 1233)    Physical Exam  Vitals reviewed.   HENT:      Head: Normocephalic.      Mouth/Throat:      Mouth: Mucous membranes are dry.      Pharynx: Oropharynx is clear.   Cardiovascular:      Rate and Rhythm: Normal rate.      Pulses: Normal pulses.   Pulmonary:      Effort: Pulmonary effort is normal.      Breath sounds: Rhonchi present.   Abdominal:      General: Bowel sounds are normal.      Palpations: Abdomen is soft.   Musculoskeletal:         General: Normal range of motion.   Skin:     General: Skin is warm and dry.      Capillary Refill: Capillary refill takes less than 2 seconds.   Neurological:      General: No focal deficit present.      Mental Status: He is alert and oriented to person, place, and time.          Additional Data:     Lab Results:  Results from last 7 days   Lab Units 06/12/24  0647   WBC Thousand/uL 18.00*   HEMOGLOBIN g/dL 13.9   HEMATOCRIT % 42.2   PLATELETS Thousands/uL 174   SEGS PCT % 87*    LYMPHO PCT % 5*   MONO PCT % 7   EOS PCT % 0     Results from last 7 days   Lab Units 06/12/24  0647   SODIUM mmol/L 139   POTASSIUM mmol/L 3.3*   CHLORIDE mmol/L 100   CO2 mmol/L 28   BUN mg/dL 14   CREATININE mg/dL 0.95   ANION GAP mmol/L 11   CALCIUM mg/dL 9.4   ALBUMIN g/dL 3.9   TOTAL BILIRUBIN mg/dL 0.70   ALK PHOS U/L 114*   ALT U/L 21   AST U/L 29   GLUCOSE RANDOM mg/dL 175*     Results from last 7 days   Lab Units 06/12/24  0647   INR  1.09         Lab Results   Component Value Date    HGBA1C 5.9 (H) 02/21/2024    HGBA1C 5.9 (H) 08/16/2023    HGBA1C 6.2 (H) 05/01/2023     Results from last 7 days   Lab Units 06/12/24  0647   LACTIC ACID mmol/L 1.5   PROCALCITONIN ng/ml 0.40*       Lines/Drains:  Invasive Devices       Peripheral Intravenous Line  Duration             Peripheral IV 06/12/24 Proximal;Right;Ventral (anterior) Forearm <1 day                        Imaging: Reviewed radiology reports from this admission including: chest xray  FL barium swallow video w speech   Final Result by MADELAINE PAZ (06/12 1443)      XR chest portable   ED Interpretation by Emeli Horne MD (06/12 0704)   Abnormal   Multifocal pneumonia of the right lung fields.      Final Result by Micky Zazueta MD (06/12 0844)      Large airspace opacity in the right lung consistent with pneumonia.      Findings concur with the preliminary report by the referring clinician already in PACS and/or our electronic record EPIC.         Workstation performed: [id number]             EKG and Other Studies Reviewed on Admission:   EKG: Personally Reviewed.    ** Please Note: This note has been constructed using a voice recognition system. **

## 2024-06-12 NOTE — ASSESSMENT & PLAN NOTE
Oxygen requirement at time of admission: 2l - Continue to wean as tolerated  Secondary to: Pneumonia  COVID/flu/RSV negative   mild elevation  Continue to treat underlying cause, see further management below  Monitor vital signs, SpO2  Plan as in pneumonia

## 2024-06-12 NOTE — ASSESSMENT & PLAN NOTE
VBS ordered  Reviewed findings today  Will discuss with family and patient regarding diet modifications/alternate forms of nutrition

## 2024-06-12 NOTE — ASSESSMENT & PLAN NOTE
Currently receiving chemo once a month, IVIG once a month  Continue follow-up with outpatient oncology

## 2024-06-12 NOTE — PLAN OF CARE
Problem: PAIN - ADULT  Goal: Verbalizes/displays adequate comfort level or baseline comfort level  Description: Interventions:  - Encourage patient to monitor pain and request assistance  - Assess pain using appropriate pain scale  - Administer analgesics based on type and severity of pain and evaluate response  - Implement non-pharmacological measures as appropriate and evaluate response  - Consider cultural and social influences on pain and pain management  - Notify physician/advanced practitioner if interventions unsuccessful or patient reports new pain  6/12/2024 1957 by Evelyn Aponte  Outcome: Progressing  6/12/2024 1956 by Evelyn Aponte  Outcome: Progressing     Problem: INFECTION - ADULT  Goal: Absence or prevention of progression during hospitalization  Description: INTERVENTIONS:  - Assess and monitor for signs and symptoms of infection  - Monitor lab/diagnostic results  - Monitor all insertion sites, i.e. indwelling lines, tubes, and drains  - Monitor endotracheal if appropriate and nasal secretions for changes in amount and color  - Sherrill appropriate cooling/warming therapies per order  - Administer medications as ordered  - Instruct and encourage patient and family to use good hand hygiene technique  - Identify and instruct in appropriate isolation precautions for identified infection/condition  6/12/2024 1957 by Evelyn Aponte  Outcome: Progressing  6/12/2024 1956 by Evelyn Aponte  Outcome: Progressing  Goal: Absence of fever/infection during neutropenic period  Description: INTERVENTIONS:  - Monitor WBC    6/12/2024 1957 by Evelyn Aponte  Outcome: Progressing  6/12/2024 1956 by Evelyn Aponte  Outcome: Progressing     Problem: SAFETY ADULT  Goal: Patient will remain free of falls  Description: INTERVENTIONS:  - Educate patient/family on patient safety including physical limitations  - Instruct patient to call for assistance with activity   - Consult OT/PT to assist with strengthening/mobility    - Keep Call bell within reach  - Keep bed low and locked with side rails adjusted as appropriate  - Keep care items and personal belongings within reach  - Initiate and maintain comfort rounds  - Make Fall Risk Sign visible to staff  - Offer Toileting every 2 Hours, in advance of need  - Initiate/Maintain bed alarm  - Obtain necessary fall risk management equipment: yellow socks  - Apply yellow socks and bracelet for high fall risk patients  - Consider moving patient to room near nurses station  6/12/2024 1957 by Evelyn Aponte  Outcome: Progressing  6/12/2024 1956 by Evelyn Aponte  Outcome: Progressing  Goal: Maintain or return to baseline ADL function  Description: INTERVENTIONS:  -  Assess patient's ability to carry out ADLs; assess patient's baseline for ADL function and identify physical deficits which impact ability to perform ADLs (bathing, care of mouth/teeth, toileting, grooming, dressing, etc.)  - Assess/evaluate cause of self-care deficits   - Assess range of motion  - Assess patient's mobility; develop plan if impaired  - Assess patient's need for assistive devices and provide as appropriate  - Encourage maximum independence but intervene and supervise when necessary  - Involve family in performance of ADLs  - Assess for home care needs following discharge   - Consider OT consult to assist with ADL evaluation and planning for discharge  - Provide patient education as appropriate  6/12/2024 1957 by Evelyn Aponte  Outcome: Progressing  6/12/2024 1956 by Evelyn Aponte  Outcome: Progressing  Goal: Maintains/Returns to pre admission functional level  Description: INTERVENTIONS:  - Perform AM-PAC 6 Click Basic Mobility/ Daily Activity assessment daily.  - Set and communicate daily mobility goal to care team and patient/family/caregiver.   - Collaborate with rehabilitation services on mobility goals if consulted  - Perform Range of Motion 3 times a day.  - Reposition patient every 2 hours.  - Dangle  patient 3 times a day  - Stand patient 3 times a day  - Ambulate patient 3 times a day  - Out of bed to chair 3 times a day   - Out of bed for meals 3 times a day  - Out of bed for toileting  - Record patient progress and toleration of activity level   6/12/2024 1957 by Evelyn Aponte  Outcome: Progressing  6/12/2024 1956 by Evelyn Aponte  Outcome: Progressing     Problem: DISCHARGE PLANNING  Goal: Discharge to home or other facility with appropriate resources  Description: INTERVENTIONS:  - Identify barriers to discharge w/patient and caregiver  - Arrange for needed discharge resources and transportation as appropriate  - Identify discharge learning needs (meds, wound care, etc.)  - Arrange for interpretive services to assist at discharge as needed  - Refer to Case Management Department for coordinating discharge planning if the patient needs post-hospital services based on physician/advanced practitioner order or complex needs related to functional status, cognitive ability, or social support system  6/12/2024 1957 by Evelyn Aponte  Outcome: Progressing  6/12/2024 1956 by Evelyn Aponte  Outcome: Progressing     Problem: Knowledge Deficit  Goal: Patient/family/caregiver demonstrates understanding of disease process, treatment plan, medications, and discharge instructions  Description: Complete learning assessment and assess knowledge base.  Interventions:  - Provide teaching at level of understanding  - Provide teaching via preferred learning methods  6/12/2024 1957 by Evelyn Aponte  Outcome: Progressing  6/12/2024 1956 by Evelyn Aponte  Outcome: Progressing

## 2024-06-12 NOTE — ED PROVIDER NOTES
History  Chief Complaint   Patient presents with    Shortness of Breath     Pt reports to er via walkin pt reports he thinks he has pneumonia   , pt reports he is getting chemo and had a pulse ox between 85-88     78 year old male presents for evaluation of cough and fever which began last night.  Cough is productive; however, it was too dark for him to see the color.  Tmax 100.8F.  Patient has history of multiple myeloma and is currently undergoing chemotherapy and IVIG infusions.  History of CHF and bioprosthetic aortic valve.  No current anticoagulation.  No history of COPD or asthma.  No home oxygen.  Patient states he was in his usual state of health when he went to bed last night.  Denies any associated chest pain or shortness of breath.      Shortness of Breath      Prior to Admission Medications   Prescriptions Last Dose Informant Patient Reported? Taking?   IMMUNE GLOBULIN, HUMAN, IV  Self Yes No   Sig: Inject 30,000 mg into a catheter in a vein   Jardiance 25 MG TABS  Self No No   Sig: TAKE 1 TABLET BY MOUTH EVERY DAY IN THE MORNING   aspirin 81 MG tablet  Self Yes No   Sig: Take 1 tablet (81 mg total) by mouth daily   atorvastatin (LIPITOR) 40 mg tablet  Self No No   Sig: TAKE 1 TABLET BY MOUTH EVERY DAY   cholecalciferol (VITAMIN D3) 1,000 units tablet  Self No No   Sig: Take 2 tablets (2,000 Units total) by mouth daily   dexamethasone (DECADRON) 4 mg tablet  Self Yes No   ferrous sulfate 324 (65 Fe) mg  Self No No   Sig: TAKE 1 TABLET BY MOUTH EVERY DAY WITH BREAKFAST   fluorouracil (EFUDEX) 5 % cream  Self Yes No   Sig: Apply topically 2 (two) times a day   furosemide (LASIX) 20 mg tablet  Self No No   Sig: TAKE 1 TABLET BY MOUTH TWICE A DAY   metFORMIN (GLUCOPHAGE-XR) 500 mg 24 hr tablet  Self No No   Sig: TAKE 3 TABLETS (1,500 MG TOTAL) BY MOUTH DAILY WITH BREAKFAST   metoprolol succinate (TOPROL-XL) 100 mg 24 hr tablet  Self No No   Sig: TAKE 1 TABLET BY MOUTH EVERY DAY   montelukast (SINGULAIR) 10  mg tablet  Self No No   Sig: TAKE 1 TABLET BY MOUTH EVERY DAY   multivitamin-minerals (CENTRUM ADULTS) tablet  Self No No   Sig: Take 1 tablet by mouth daily   omeprazole (PriLOSEC) 40 MG capsule   No No   Sig: TAKE 1 CAPSULE (40 MG TOTAL) BY MOUTH DAILY.   valACYclovir (VALTREX) 500 mg tablet  Self Yes No   Sig: Take 500 mg by mouth daily      Facility-Administered Medications: None       Past Medical History:   Diagnosis Date    Acute on chronic systolic (congestive) heart failure (HCC) 05/15/2024    Acute respiratory failure with hypoxia (HCC) 04/20/2022    Arthritis     Cancer (HCC)     Cataract     Colitis     Colon polyp     Fatty liver     GERD (gastroesophageal reflux disease)     History of chemotherapy     History of radiation therapy     History of shingles 2018    History of transfusion     Hyperlipidemia     Sepsis without acute organ dysfunction (HCC) 02/04/2023    Shingles 2017       Past Surgical History:   Procedure Laterality Date    AORTIC VALVE REPLACEMENT      CARDIAC VALVE REPLACEMENT  2014    aorta    CATARACT EXTRACTION Bilateral     COLECTOMY      COLONOSCOPY  11/2019    Prior right hemicolectomy    CORONARY ARTERY BYPASS GRAFT      DENTAL SURGERY      JOINT REPLACEMENT  January 2019    left knee    KNEE SURGERY      LYMPH NODE BIOPSY  2003    LYMPHADENECTOMY      OTHER SURGICAL HISTORY      MAZE PROCEDURE    AK ARTHRP KNE CONDYLE&PLATU MEDIAL&LAT COMPARTMENTS Left 01/28/2019    Procedure: ARTHROPLASTY LEFT KNEE TOTAL;  Surgeon: Darell Delgado MD;  Location:  MAIN OR;  Service: Orthopedics    AK LARYNGOSCOPY DIRECT OPERATIVE W/BIOPSY Right 04/27/2022    Procedure: DIRECT LARYNGOSCOPY WITH BIOPSY RIGHT PHARYNX, POSSIBLE RIGHT NECK LYMPH NODE BIOPSY; FROZEN SECTION;  Surgeon: Kevin Hardin MD;  Location:  MAIN OR;  Service: ENT    SKIN BIOPSY      UPPER GASTROINTESTINAL ENDOSCOPY  11/2019    Schatzki ring    US GUIDED LYMPH NODE BIOPSY RIGHT  12/14/2021       Family History   Problem  "Relation Age of Onset    Heart block Family     Coronary artery disease Mother     Thrombosis Mother     Hypertension Mother     Arthritis Mother     Hyperlipidemia Mother     Diabetes Father     Hypertension Father     Arthritis Father     Sudden death Father         cardiac    Diabetes type II Father     Colon cancer Paternal Grandfather     Cancer Paternal Grandfather     Breast cancer Sister     Heart disease Brother         Coronary stents     I have reviewed and agree with the history as documented.    E-Cigarette/Vaping    E-Cigarette Use Never User      E-Cigarette/Vaping Substances    Nicotine No     THC No     CBD No     Flavoring No     Other No     Unknown No      Social History     Tobacco Use    Smoking status: Former     Current packs/day: 0.00     Average packs/day: 1 pack/day for 42.0 years (42.0 ttl pk-yrs)     Types: Cigarettes     Start date: 1967     Quit date: 1/1/2009     Years since quitting: 15.4    Smokeless tobacco: Never   Vaping Use    Vaping status: Never Used   Substance Use Topics    Alcohol use: Yes     Alcohol/week: 2.0 standard drinks of alcohol     Types: 2 Cans of beer per week     Comment: very rare \"beer here and there\"    Drug use: No       Review of Systems   Respiratory:  Positive for shortness of breath.        Physical Exam  Physical Exam  Vitals and nursing note reviewed.   HENT:      Head: Normocephalic and atraumatic.   Cardiovascular:      Rate and Rhythm: Normal rate and regular rhythm.      Pulses: Normal pulses.      Heart sounds: Murmur heard.      Systolic murmur is present with a grade of 3/6.   Pulmonary:      Effort: Pulmonary effort is normal. No accessory muscle usage.      Breath sounds: Examination of the right-middle field reveals rales. Examination of the right-lower field reveals rales. Rales present.   Abdominal:      General: There is no distension.      Palpations: Abdomen is soft.      Tenderness: There is no abdominal tenderness.   Musculoskeletal: "      Right lower leg: No edema.      Left lower leg: No edema.   Skin:     General: Skin is warm and dry.   Neurological:      Mental Status: He is alert.         Vital Signs  ED Triage Vitals [06/12/24 0635]   Temperature Pulse Respirations Blood Pressure SpO2   98.5 °F (36.9 °C) 89 20 134/60 (!) 88 %      Temp Source Heart Rate Source Patient Position - Orthostatic VS BP Location FiO2 (%)   Temporal Monitor -- -- --      Pain Score       --           Vitals:    06/12/24 0635 06/12/24 0700 06/12/24 0730 06/12/24 0800   BP: 134/60 117/59 126/60 139/65   Pulse: 89 82 80 76         Visual Acuity      ED Medications  Medications   vancomycin (VANCOCIN) 1750 mg in sodium chloride 0.9% 500 mL IVPB (1,750 mg Intravenous New Bag 6/12/24 0752)   sodium chloride 0.9 % bolus 500 mL (500 mL Intravenous New Bag 6/12/24 0743)   cefepime (MAXIPIME) IVPB (premix in dextrose) 2,000 mg 50 mL (0 mg Intravenous Stopped 6/12/24 0750)   potassium chloride (Klor-Con M20) CR tablet 40 mEq (40 mEq Oral Given 6/12/24 0741)       Diagnostic Studies  Results Reviewed       Procedure Component Value Units Date/Time    FLU/RSV/COVID - if FLU/RSV clinically relevant [[id number]]  (Normal) Collected: 06/12/24 0647    Lab Status: Final result Specimen: Nares from Nose Updated: 06/12/24 0809     SARS-CoV-2 Negative     INFLUENZA A PCR Negative     INFLUENZA B PCR Negative     RSV PCR Negative    Narrative:      FOR PEDIATRIC PATIENTS - copy/paste COVID Guidelines URL to browser: [email or web address]    SARS-CoV-2 assay is a Nucleic Acid Amplification assay intended for the  qualitative detection of nucleic acid from SARS-CoV-2 in nasopharyngeal  swabs. Results are for the presumptive identification of SARS-CoV-2 RNA.    Positive results are indicative of infection with SARS-CoV-2, the virus  causing COVID-19, but do not rule out bacterial infection or co-infection  with other viruses.  Laboratories within the [place] and its  territories are required to report all positive results to the appropriate  public health authorities. Negative results do not preclude SARS-CoV-2  infection and should not be used as the sole basis for treatment or other  patient management decisions. Negative results must be combined with  clinical observations, patient history, and epidemiological information.  This test has not been FDA cleared or approved.    This test has been authorized by FDA under an Emergency Use Authorization  (EUA). This test is only authorized for the duration of time the  declaration that circumstances exist justifying the authorization of the  emergency use of an in vitro diagnostic tests for detection of SARS-CoV-2  virus and/or diagnosis of COVID-19 infection under section 564(b)(1) of  the Act, 21 U.S.C. 360bbb-3(b)(1), unless the authorization is terminated  or revoked sooner. The test has been validated but independent review by FDA  and CLIA is pending.    Test performed using [employer] GeneXpert: This RT-PCR assay targets N2,  a region unique to SARS-CoV-2. A conserved region in the E-gene was chosen  for pan-Sarbecovirus detection which includes SARS-CoV-2.    According to CMS-2020-01-R, this platform meets the definition of high-throughput technology.    HS Troponin I 4hr [[id number]]     Lab Status: No result Specimen: Blood     Lactic acid [[id number]]  (Normal) Collected: 06/12/24 0647    Lab Status: Final result Specimen: Blood from Arm, Right Updated: 06/12/24 0732     LACTIC ACID 1.5 mmol/L     Narrative:      Result may be elevated if tourniquet was used during collection.    Procalcitonin [[id number]]  (Abnormal) Collected: 06/12/24 0647    Lab Status: Final result Specimen: Blood from Arm, Right Updated: 06/12/24 0727     Procalcitonin 0.40 ng/ml     B-Type Natriuretic Peptide(BNP) [[id number]]  (Abnormal) Collected: 06/12/24 0647    Lab Status: Final result Specimen: Blood  from Arm, Right Updated: 06/12/24 0724      pg/mL     HS Troponin I 2hr [[id number]]     Lab Status: No result Specimen: Blood     HS Troponin 0hr (reflex protocol) [[id number]]  (Normal) Collected: 06/12/24 0647    Lab Status: Final result Specimen: Blood from Arm, Right Updated: 06/12/24 0724     hs TnI 0hr 28 ng/L     Protime-INR [[id number]]  (Abnormal) Collected: 06/12/24 0647    Lab Status: Final result Specimen: Blood from Arm, Right Updated: 06/12/24 0723     Protime 14.6 seconds      INR 1.09    APTT [[id number]]  (Normal) Collected: 06/12/24 0647    Lab Status: Final result Specimen: Blood from Arm, Right Updated: 06/12/24 0723     PTT 29 seconds     Comprehensive metabolic panel [[id number]]  (Abnormal) Collected: 06/12/24 0647    Lab Status: Final result Specimen: Blood from Arm, Right Updated: 06/12/24 0718     Sodium 139 mmol/L      Potassium 3.3 mmol/L      Chloride 100 mmol/L      CO2 28 mmol/L      ANION GAP 11 mmol/L      BUN 14 mg/dL      Creatinine 0.95 mg/dL      Glucose 175 mg/dL      Calcium 9.4 mg/dL      AST 29 U/L      ALT 21 U/L      Alkaline Phosphatase 114 U/L      Total Protein 6.8 g/dL      Albumin 3.9 g/dL      Total Bilirubin 0.70 mg/dL      eGFR 76 ml/min/1.73sq m     Narrative:      National Kidney Disease Foundation guidelines for Chronic Kidney Disease (CKD):     Stage 1 with normal or high GFR (GFR > 90 mL/min/1.73 square meters)    Stage 2 Mild CKD (GFR = 60-89 mL/min/1.73 square meters)    Stage 3A Moderate CKD (GFR = 45-59 mL/min/1.73 square meters)    Stage 3B Moderate CKD (GFR = 30-44 mL/min/1.73 square meters)    Stage 4 Severe CKD (GFR = 15-29 mL/min/1.73 square meters)    Stage 5 End Stage CKD (GFR <15 mL/min/1.73 square meters)  Note: GFR calculation is accurate only with a steady state creatinine    Blood culture #1 [[id number]] Collected: 06/12/24 0651    Lab Status: In process Specimen: Blood from Arm, Left Updated: 06/12/24 0658    Blood culture #2 [[id number]]  Collected: 06/12/24 0647    Lab Status: In process Specimen: Blood from Arm, Right Updated: 06/12/24 0658    CBC and differential [[id number]]  (Abnormal) Collected: 06/12/24 0647    Lab Status: Final result Specimen: Blood from Arm, Right Updated: 06/12/24 0658     WBC 18.00 Thousand/uL      RBC 4.86 Million/uL      Hemoglobin 13.9 g/dL      Hematocrit 42.2 %      MCV 87 fL      MCH 28.6 pg      MCHC 32.9 g/dL      RDW 14.4 %      MPV 9.8 fL      Platelets 174 Thousands/uL      nRBC 0 /100 WBCs      Segmented % 87 %      Immature Grans % 1 %      Lymphocytes % 5 %      Monocytes % 7 %      Eosinophils Relative 0 %      Basophils Relative 0 %      Absolute Neutrophils 15.59 Thousands/µL      Absolute Immature Grans 0.14 Thousand/uL      Absolute Lymphocytes 0.94 Thousands/µL      Absolute Monocytes 1.24 Thousand/µL      Eosinophils Absolute 0.02 Thousand/µL      Basophils Absolute 0.07 Thousands/µL                    XR chest portable   ED Interpretation by Emeli Horne MD (06/12 0704)   Abnormal   Multifocal pneumonia of the right lung fields.                 Procedures  ECG 12 Lead Documentation Only    Date/Time: 6/12/2024 6:45 AM    Performed by: Emeli Horne MD  Authorized by: Emeli Horne MD    Indications / Diagnosis:  Hypoxia  ECG reviewed by me, the ED Provider: yes    Patient location:  ED  Previous ECG:     Previous ECG:  Compared to current    Comparison ECG info:  11/18/23 sinus rhythm with 1st degree av block and intraventricular conduction block    Similarity:  No change  Interpretation:     Interpretation: abnormal    Rate:     ECG rate:  86    ECG rate assessment: normal    Rhythm:     Rhythm: sinus rhythm    Ectopy:     Ectopy: none    QRS:     QRS axis:  Normal    QRS intervals:  Wide  Conduction:     Conduction: abnormal      Abnormal conduction: 1st degree and non-specific intraventricular conduction delay    ST segments:     ST segments:  Non-specific     Elevation:  V3 and V4  T waves:     T waves: normal             ED Course  ED Course as of 06/12/24 0817   Wed Jun 12, 2024   0703 WBC(!): 18.00                            Initial Sepsis Screening       Row Name 06/12/24 0815                Is the patient's history suggestive of a new or worsening infection? Yes (Proceed)  -EE        Suspected source of infection pneumonia  -EE        Indicate SIRS criteria Leukocytosis (WBC > 38339 IJL) OR Leukopenia (WBC <4000 IJL) OR Bandemia (WBC >10% bands)  -EE        Are two or more of the above signs & symptoms of infection both present and new to the patient? No  -EE                  User Key  (r) = Recorded By, (t) = Taken By, (c) = Cosigned By      Initials Name Provider Type    EE Emeli Horne MD Physician                    SBIRT 20yo+      Flowsheet Row Most Recent Value   Initial Alcohol Screen: US AUDIT-C     1. How often do you have a drink containing alcohol? 0 Filed at: 06/12/2024 0637   2. How many drinks containing alcohol do you have on a typical day you are drinking?  0 Filed at: 06/12/2024 0637   3a. Male UNDER 65: How often do you have five or more drinks on one occasion? 0 Filed at: 06/12/2024 0637   3b. FEMALE Any Age, or MALE 65+: How often do you have 4 or more drinks on one occassion? 0 Filed at: 06/12/2024 0637   Audit-C Score 0 Filed at: 06/12/2024 0637   BETO: How many times in the past year have you...    Used an illegal drug or used a prescription medication for non-medical reasons? Never Filed at: 06/12/2024 0637                      Medical Decision Making  78 year old male presents for evaluation of cough and fever which began last night.  88% on room air upon arrival which improved with 2 L NC.  CXR concerning for multifocal pneumonia of the right lung fields on my independent interpretation.  Only current SIRS criteria is leukocytosis.  No lactic acidosis.  Broad spectrum antibiotics ordered.  Given history of CHF, cautious fluids  to prevent exacerbation.  500 mL NS ordered.  Patient admitted for further evaluation and management.    Amount and/or Complexity of Data Reviewed  Labs: ordered. Decision-making details documented in ED Course.  Radiology: ordered and independent interpretation performed.    Risk  Prescription drug management.  Decision regarding hospitalization.             Disposition  Final diagnoses:   Multifocal pneumonia   Acute respiratory insufficiency     Time reflects when diagnosis was documented in both MDM as applicable and the Disposition within this note       Time User Action Codes Description Comment    6/12/2024  7:45 AM Emeli Horne [J18.9] Multifocal pneumonia     6/12/2024  7:45 AM Emeli Horne [R06.89] Acute respiratory insufficiency           ED Disposition       ED Disposition   Admit    Condition   Stable    Date/Time   Wed Jun 12, 2024 4562    Comment   Case was discussed with WINTER and the patient's admission status was agreed to be Admission Status: inpatient status to the service of Dr. Messina .               Follow-up Information    None         Patient's Medications   Discharge Prescriptions    No medications on file       No discharge procedures on file.    PDMP Review         Value Time User    PDMP Reviewed  Yes 11/20/2023 12:38 PM Rishabh Marquis MD            ED Provider  Electronically Signed by             Emeli Horne MD  06/12/24 5383

## 2024-06-12 NOTE — PROCEDURES
Video Swallow Study      Patient Name: Gabe Pickard  Today's Date: 6/12/2024        Past Medical History  Past Medical History:   Diagnosis Date    Acute on chronic systolic (congestive) heart failure (HCC) 05/15/2024    Acute respiratory failure with hypoxia (HCC) 04/20/2022    Arthritis     Cancer (HCC)     Cataract     Colitis     Colon polyp     Fatty liver     GERD (gastroesophageal reflux disease)     History of chemotherapy     History of radiation therapy     History of shingles 2018    History of transfusion     Hyperlipidemia     Sepsis without acute organ dysfunction (HCC) 02/04/2023    Shingles 2017        Past Surgical History  Past Surgical History:   Procedure Laterality Date    AORTIC VALVE REPLACEMENT      CARDIAC VALVE REPLACEMENT  2014    aorta    CATARACT EXTRACTION Bilateral     COLECTOMY      COLONOSCOPY  11/2019    Prior right hemicolectomy    CORONARY ARTERY BYPASS GRAFT      DENTAL SURGERY      JOINT REPLACEMENT  January 2019    left knee    KNEE SURGERY      LYMPH NODE BIOPSY  2003    LYMPHADENECTOMY      OTHER SURGICAL HISTORY      MAZE PROCEDURE    MA ARTHRP KNE CONDYLE&PLATU MEDIAL&LAT COMPARTMENTS Left 01/28/2019    Procedure: ARTHROPLASTY LEFT KNEE TOTAL;  Surgeon: Darell Delgado MD;  Location:  MAIN OR;  Service: Orthopedics    MA LARYNGOSCOPY DIRECT OPERATIVE W/BIOPSY Right 04/27/2022    Procedure: DIRECT LARYNGOSCOPY WITH BIOPSY RIGHT PHARYNX, POSSIBLE RIGHT NECK LYMPH NODE BIOPSY; FROZEN SECTION;  Surgeon: Kevin Hardin MD;  Location:  MAIN OR;  Service: ENT    SKIN BIOPSY      UPPER GASTROINTESTINAL ENDOSCOPY  11/2019    Schatzki ring    US GUIDED LYMPH NODE BIOPSY RIGHT  12/14/2021       Modified (Video) Barium Swallow Study    Summary:  Images are on PACS for review.     Pt w/ mild oral, severe pharyngeal dysphagia. Mildly prolonged mastication/oral organization. Fairly brisk transfers though mild diffuse residue. Severely delayed  "swallow initiation w/ material pooling in the pyriforms prior to initiation. Epiglottis is unable to invert 2/2 contact w/ PPW due to curvature of cervical spine/pharyngeal mass. Minimal hyo-laryngeal elevation w/ incomplete laryngeal vestibule closure resulting in deep penetration w/ subsequent aspiration of all liquid consistencies. Pt w/ inconsistent response to aspiration events (strong cough w/ thin, throat clear or silent w/ thickened liquids). Unfortunately, strategies not effective in improving airway protection at this time. Significant pharyngeal retention is noted w/ solids, not cleared w/ mult swallows despite max attempts.     Recommendations:  Diet: Deferred at this time, f/u w/ pt/spouse to discuss options and associated risks/benefits   Meds: Non-oral if possible   Strategies: -   Frequent oral care  Upright position  F/u ST tx: Yes   Therapy Prognosis:Poor   Prognosis considerations: Age, progressive disease process, severity of deficits   Full/Intermittent Supervision  Aspiration Precautions  Reflux Precautions  Consider consult with: ?GI for alt means of nutrition if within GOC, ?palliative care   Repeat MBS as necessary  If a dedicated assessment of the esophagus is desired, consider esophagram/barium swallow or EGD.      Goals:  Pt will tolerate least restrictive diet w/out s/s aspiration or oral/pharyngeal difficulties.      Patient's goal: \"Let's hope we can figure something on\"       H&P/pertinent provider notes: (PMH noted above)  Pt is a 78 y.o. male who presented to  [facility]  for evaluation of cough and fever which began last night.  Cough is productive; however, it was too dark for him to see the color.  Tmax 100.8F.  Patient has history of multiple myeloma and is currently undergoing chemotherapy and IVIG infusions.  History of CHF and bioprosthetic aortic valve.  No current anticoagulation.  No history of COPD or asthma.  No home oxygen.  Patient states he was in his " usual state of health when he went to bed last night.  Denies any associated chest pain or shortness of breath.     Special Studies:  CXR 6/12: Large airspace opacity in the right lung consistent with pneumonia     Previous VBS:  4/21/22: Video Barium Swallow Study     Summary:  Images are on PACS for review.   Pt presents w/ WFL oral, mod-severe pharyngeal dysphagia. Noted mass on posterior pharyngeal wall (identified on ENT scope 3/31/22) is impeding epiglottic inversion and bolus clearance resulting in penetration of all material. All material penetrates to the vocal cords, lining the cords(trace aspiration) and not independently ejected. Several attempted strategies not effective in reducing penetration/trace aspiration events. Dorian aspiration x1 w/ soft solid chicken, pt coughed and ejected material from trachea. Mod vallecular retention w/ all, increased w/ advanced texture consistencies.         Per gross esophageal screen:  Unremarkable. Barium tablet not administered today for pt safety.      VBS findings thoroughly reviewed w/ pt w/ use of images to aid in understanding. Education provided on risks of aspiration w/ each administered consistency and options for diet modifications to potentially reduce aspiration, though aspiration risk remains w/ all PO. Further education provided on risks vs benefits of liquid/solid texture modifications (reduced aspiration risk and subsequent medical implications, increased retention, potential dehydration, etc.). Education provided on strategies to optimize swallow safety without dysphagia diet modifications, including the importance of oral care and s/s medical complications associated w/ aspiration to monitor for and notify medical team of should they arise. Pt verbalized understanding of options and at this time states acceptance of risk associated w/ puree and thin liquids. Physician notified of decision.      Recommendations:  Diet: Defer to physician though  consider puree w/ thin   Meds: Non-oral if possible, crush if necessary         11/27/18:  Summary: The patient presented with a mild pharyngeal dysphagia characterized by inconsistent epiglottic inversion, with decreased base of tongue movement. This resulted in valleculae retention with regular solids, and barium tablet.         Does the pt have pain? No   If yes, was nursing made aware/was it addressed? N/A    Swallow Mechanism Exam  Facial: symmetrical  Labial: WFL  Lingual: WFL  Velum: symmetrical  Mandible: adequate ROM  Dentition: adequate  Vocal quality:clear/adequate   Volitional Cough: strong/productive   Respiratory Status: on 2L O2     Swallow Information   Current Risks for Dysphagia & Aspiration: known history of dysphagia and known history of aspiration  Current Symptoms/Concerns: coughing with po and change in respiratory status  Current Diet: NPO   Baseline Diet: regular diet and thin liquids, pt was recommended for thin purees after MBS 2 years ago though pt states he resumed regular/thin independently (did not f/u w/ OP ST) once at home          Consistencies Administered:  Pt was viewed sitting upright in the lateral and AP positions. Due to concerns for patient safety, trials provided deviated from the [facility] Protocol. Consistencies administered included: thin (via tsp, cup sip), nectar  (tsp, cup), honey (tsp, cup), 5-mL pudding, ½ cookie coated with 3-mL pudding and 5-mL pudding in the AP position.       Oral Impairment:  Lip Closure: complete   Tongue Control During Bolus Hold: fair   Bolus Preparation/Mastication: mild prolonged   Bolus Transport/Lingual Motion: brisk   Oral Residue: mild noted diffusely   Initiation of the Pharyngeal Swallow: delayed, bolus head pooling in the pyriforms      Pharyngeal Impairment:  Soft Palate Elevation: complete   Laryngeal Elevation: reduced, minimal   Anterior Hyoid Excursion: reduced, minimal   Epiglottic Movement: incomplete w/ contact to PPW  preventing inversion   Laryngeal Vestibular Closure: incomplete   Pharyngeal Stripping Wave:  present  Pharyngeal Contraction: complete   PES Opening: constricted   Tongue Base Retraction: reduced   Pharyngeal Residue: significant diffusely     Screening of Esophageal Impairment   Esophageal Clearance:  noted retention       Penetration/Aspiration:  Thin: neptali aspiration w/ tsp and cup sips (PAS 7)     Nectar: trace aspiration via cup sip (PAS 7)    Honey: trace aspiration via tsp and cup sip (PAS 7)   Puree: no penetration/aspiration   Solid: no penetration/aspiration   Response to Aspiration: inconsistent response, throat clear, cough, at times silent   Strategies/Efficacy: chin tuck, neck flexion, effortful swallow - not effective     8-Point Penetration-Aspiration Scale   1 Material does not enter the airway   2 Material enters the airway, remains above the vocal folds, and is ejected  from the  airway    3 Material enters the airway, remains above the vocal folds, and is not ejected from the airway   4 Material enters the airway, contacts the vocal folds, and is ejected from the airway   5 Material enters the airway, contacts the vocal folds, and is not ejected from the airway    6 Material enters the airway, passes below the vocal folds and is ejected into the larynx or out of the airway    7 Material enters the airway, passes below the vocal folds, and is not ejected from the trachea despite effort    8 Material enters the airway, passes below the vocal folds, and no effort is made to eject

## 2024-06-12 NOTE — ASSESSMENT & PLAN NOTE
"Lab Results   Component Value Date    HGBA1C 5.9 (H) 02/21/2024       No results for input(s): \"POCGLU\" in the last 72 hours.    Blood Sugar Average: Last 72 hrs:    On metformin, Jardiance at home  SSI  Diabetic diet  "

## 2024-06-12 NOTE — ASSESSMENT & PLAN NOTE
Wt Readings from Last 3 Encounters:   05/21/24 71.8 kg (158 lb 3.2 oz)   05/15/24 70.8 kg (156 lb)   02/28/24 72.3 kg (159 lb 6.4 oz)     Appears dry to euvolemic  Monitor weights, I's and O's  Hold Lasix today.  Patient appears dry  Evaluate for tomorrow

## 2024-06-12 NOTE — SPEECH THERAPY NOTE
Speech Language/Pathology    Speech-Language Pathology Bedside Swallow Evaluation      Patient Name: Gabe Pickard    Today's Date: 6/12/2024     Problem List  Active Problems:  There are no active Hospital Problems.      Past Medical History  Past Medical History:   Diagnosis Date    Acute on chronic systolic (congestive) heart failure (HCC) 05/15/2024    Acute respiratory failure with hypoxia (HCC) 04/20/2022    Arthritis     Cancer (HCC)     Cataract     Colitis     Colon polyp     Fatty liver     GERD (gastroesophageal reflux disease)     History of chemotherapy     History of radiation therapy     History of shingles 2018    History of transfusion     Hyperlipidemia     Sepsis without acute organ dysfunction (HCC) 02/04/2023    Shingles 2017       Past Surgical History  Past Surgical History:   Procedure Laterality Date    AORTIC VALVE REPLACEMENT      CARDIAC VALVE REPLACEMENT  2014    aorta    CATARACT EXTRACTION Bilateral     COLECTOMY      COLONOSCOPY  11/2019    Prior right hemicolectomy    CORONARY ARTERY BYPASS GRAFT      DENTAL SURGERY      JOINT REPLACEMENT  January 2019    left knee    KNEE SURGERY      LYMPH NODE BIOPSY  2003    LYMPHADENECTOMY      OTHER SURGICAL HISTORY      MAZE PROCEDURE    OR ARTHRP KNE CONDYLE&PLATU MEDIAL&LAT COMPARTMENTS Left 01/28/2019    Procedure: ARTHROPLASTY LEFT KNEE TOTAL;  Surgeon: Darell Delgado MD;  Location:  MAIN OR;  Service: Orthopedics    OR LARYNGOSCOPY DIRECT OPERATIVE W/BIOPSY Right 04/27/2022    Procedure: DIRECT LARYNGOSCOPY WITH BIOPSY RIGHT PHARYNX, POSSIBLE RIGHT NECK LYMPH NODE BIOPSY; FROZEN SECTION;  Surgeon: Kevin Hardin MD;  Location:  MAIN OR;  Service: ENT    SKIN BIOPSY      UPPER GASTROINTESTINAL ENDOSCOPY  11/2019    Schatzki ring    US GUIDED LYMPH NODE BIOPSY RIGHT  12/14/2021       Summary   Pt w/ h/o mod-severe pharyngeal dysphagia w/ identified aspiration across consistencies on MBS in April 2022. Today, pt reports ongoing  dysphagia w/ frequent coughing w/ PO and recurrent pnas since 2022. Mastication, oral organization, and transfers appears functional. Swallows are suspected delayed w/ reduced movement to palpation. +throat clearing w/ all trials today. S/w pt and MD regarding rationale for repeat instrumentation given hx. Pt agreeable to study, ordered by MD and scheduled for today at 1400. presented with functional appearing oral and pharyngeal stage swallowing skills with materials administered today.        Risk/s for Aspiration: high risk, h/o dysphagia/aspiration, pharyngeal mass s/p chemo     Recommended Diet:  deferred until MBS     Recommended Form of Meds: non-oral if possible   Aspiration precautions and swallowing strategies:   Other Recommendations: Continue frequent oral care         Current Medical Status  Pt is a 78 y.o. male who presented to  [facility]  for evaluation of cough and fever which began last night.  Cough is productive; however, it was too dark for him to see the color.  Tmax 100.8F.  Patient has history of multiple myeloma and is currently undergoing chemotherapy and IVIG infusions.  History of CHF and bioprosthetic aortic valve.  No current anticoagulation.  No history of COPD or asthma.  No home oxygen.  Patient states he was in his usual state of health when he went to bed last night.  Denies any associated chest pain or shortness of breath.       Current Precautions:     Allergies:  No known food allergies    Past medical history:  Please see H&P for details    Special Studies:  CXR 6/12: Large airspace opacity in the right lung consistent with pneumonia.       Previous MBS:    4/21/22: Video Barium Swallow Study     Summary:  Images are on PACS for review.   Pt presents w/ WFL oral, mod-severe pharyngeal dysphagia. Noted mass on posterior pharyngeal wall (identified on ENT scope 3/31/22) is impeding epiglottic inversion and bolus clearance resulting in penetration of all material. All  material penetrates to the vocal cords, lining the cords(trace aspiration) and not independently ejected. Several attempted strategies not effective in reducing penetration/trace aspiration events. Dorian aspiration x1 w/ soft solid chicken, pt coughed and ejected material from trachea. Mod vallecular retention w/ all, increased w/ advanced texture consistencies.         Per gross esophageal screen:  Unremarkable. Barium tablet not administered today for pt safety.      VBS findings thoroughly reviewed w/ pt w/ use of images to aid in understanding. Education provided on risks of aspiration w/ each administered consistency and options for diet modifications to potentially reduce aspiration, though aspiration risk remains w/ all PO. Further education provided on risks vs benefits of liquid/solid texture modifications (reduced aspiration risk and subsequent medical implications, increased retention, potential dehydration, etc.). Education provided on strategies to optimize swallow safety without dysphagia diet modifications, including the importance of oral care and s/s medical complications associated w/ aspiration to monitor for and notify medical team of should they arise. Pt verbalized understanding of options and at this time states acceptance of risk associated w/ puree and thin liquids. Physician notified of decision.      Recommendations:  Diet: Defer to physician though consider puree w/ thin   Meds: Non-oral if possible, crush if necessary       11/27/18:  Summary: The patient presented with a mild pharyngeal dysphagia characterized by inconsistent epiglottic inversion, with decreased base of tongue movement. This resulted in valleculae retention with regular solids, and barium tablet.      Social/Education/Vocational Hx:  Pt lives  home w/ spouse    Swallow Information   Current Risks for Dysphagia & Aspiration: known history of dysphagia, known history of aspiration, and h/o pharyngeal mass   Current  "Symptoms/Concerns: coughing with po and recurrent pna  Current Diet: NPO   Baseline Diet: regular diet and thin liquids, pt was recommended for thin purees after MBS 2 years ago though pt states he resumed regular/thin independently (did not f/u w/ OP ST) once at home       Baseline Assessment   Behavior/Cognition: alert  Speech/Language Status: able to participate in conversation and able to follow commands  Patient Positioning: upright in bed  Pain Status/Interventions/Response to Interventions:  No report of or nonverbal indications of pain.       Swallow Mechanism Exam  Facial: symmetrical  Labial: WFL  Lingual: WFL  Velum: symmetrical  Mandible: adequate ROM  Dentition: full dentures  Vocal quality:clear/adequate   Volitional Cough: strong/productive   Respiratory Status: on 2L O2       Consistencies Assessed and Performance   Consistencies Administered: thin liquids, puree, soft solids, and hard solids    Oral Stage: minimal  Mastication was adequate with the materials administered today.  Bolus formation and transfer were functional with no significant oral residue noted.  No overt s/s reduced oral control.    Pharyngeal Stage:  suspect at least mild  Swallow Mechanics:  Swallowing initiation suspected delayed. Laryngeal rise was palpated and judged to be reduced. +throat clearing w/ all material administered. No strong cough.      Esophageal Concerns: none reported    Strategies and Efficacy: -     Summary and Recommendations (see above)    Results Reviewed with: patient, RN, and MD     Treatment Recommended: Yes     Frequency of treatment: MBS scheduled for later today, f/u as warranted     Patient Stated Goal: \"Whatever is going to make it better\"     Dysphagia LTG  -Patient will demonstrate safe and effective oral intake (without overt s/s significant oral/pharyngeal dysphagia including s/s penetration or aspiration) for the highest appropriate diet level.     Short Term Goals:  -Patient will comply with " a Video/Modified Barium Swallow study for more complete assessment of swallowing anatomy/physiology/aspiration risk and to assess efficacy of treatment techniques so as to best guide treatment plan      Speech Therapy Prognosis   Prognosis: deferred    Prognosis Considerations:  MBS findings

## 2024-06-12 NOTE — ASSESSMENT & PLAN NOTE
Senting with shortness of breath, fever chills, cough  History of dysphagia  Leukocytosis, RR 20, requiring 2 L of oxygen  S/p cefepime and vancomycin in ED  Check urine strep, Legionella antigen  Sputum, blood cultures  Will start ceftriaxone  Speech eval

## 2024-06-12 NOTE — SPEECH THERAPY NOTE
Speech Language/Pathology    Speech/Language Pathology Progress Note    Patient Name: Gabe Pickard  Today's Date: 6/12/2024     Problem List  Principal Problem:    Acute respiratory failure with hypoxia (HCC)  Active Problems:    Type 2 diabetes mellitus without complication, without long-term current use of insulin (HCC)    Multiple myeloma not having achieved remission (HCC)    Chronic diastolic congestive heart failure (HCC)    Pneumonia    Dysphagia       Past Medical History  Past Medical History:   Diagnosis Date    Acute on chronic systolic (congestive) heart failure (HCC) 05/15/2024    Acute respiratory failure with hypoxia (HCC) 04/20/2022    Arthritis     Cancer (HCC)     Cataract     Colitis     Colon polyp     Fatty liver     GERD (gastroesophageal reflux disease)     History of chemotherapy     History of radiation therapy     History of shingles 2018    History of transfusion     Hyperlipidemia     Sepsis without acute organ dysfunction (HCC) 02/04/2023    Shingles 2017        Past Surgical History  Past Surgical History:   Procedure Laterality Date    AORTIC VALVE REPLACEMENT      CARDIAC VALVE REPLACEMENT  2014    aorta    CATARACT EXTRACTION Bilateral     COLECTOMY      COLONOSCOPY  11/2019    Prior right hemicolectomy    CORONARY ARTERY BYPASS GRAFT      DENTAL SURGERY      JOINT REPLACEMENT  January 2019    left knee    KNEE SURGERY      LYMPH NODE BIOPSY  2003    LYMPHADENECTOMY      OTHER SURGICAL HISTORY      MAZE PROCEDURE    NM ARTHRP KNE CONDYLE&PLATU MEDIAL&LAT COMPARTMENTS Left 01/28/2019    Procedure: ARTHROPLASTY LEFT KNEE TOTAL;  Surgeon: Darell Delgado MD;  Location:  MAIN OR;  Service: Orthopedics    NM LARYNGOSCOPY DIRECT OPERATIVE W/BIOPSY Right 04/27/2022    Procedure: DIRECT LARYNGOSCOPY WITH BIOPSY RIGHT PHARYNX, POSSIBLE RIGHT NECK LYMPH NODE BIOPSY; FROZEN SECTION;  Surgeon: Kevin Hardin MD;  Location:  MAIN OR;  Service: ENT    SKIN BIOPSY      UPPER GASTROINTESTINAL  ENDOSCOPY  11/2019    Schatzki ring    US GUIDED LYMPH NODE BIOPSY RIGHT  12/14/2021         Subjective:  Pt awake and alert, spouse at bedside to review MBS findings.     Objective:  VBS findings thoroughly reviewed w/ pt and pt's spouse w/ use of images to aid in understanding. Education provided on anatomy/physiology of the swallow and pt specific identified impairments including delayed swallow initiation, reduced hyolaryngeal elevation, incomplete airway protection, and severe pharyngeal retention. Extensive education provided on risks of/identified aspiration w/ each administered consistency and potential medical complications associated w/ aspiration. Introduced risks/benefits of options to consider alternate means of nutrition vs resuming oral diet w/ use of strategies to optimize swallow safety. Pt and spouse both clearly verbalized pt would not be interested in alternate means of nutrition and preference/choice is to accept aspiration risk w/ resuming oral diet. Subsequent education provided on potential modifications (mechanical soft) to reduce pharyngeal residue and resuming thin liquids based on research re: negative outcomes of aspirating thickened liquids. Pt/spouse verbalized understanding of options and are agreeable to modified diet at this time w/ plan for ST f/u. Jaqueline KAPLAN, present for demonstration of understanding and acceptance of risk. Physician notified of decision. Education provided on the importance of frequent/thorough oral care. Pt demonstrated understanding to all and denied further questions/concerns at this time.      Assessment:  Pt/spouse w/ good understanding of MBS findings, high aspiration risk, medical complications/risks associated w/ aspiration and options for plan of care. Pt clearly demonstrates understanding of risk and at this time wishes to accept risk of resuming oral diet w/ modifications and ST f/u. Pt clearly verbalizes he would not be interested in alternate means  of nutrition despite high aspiration risk.     Plan/Recommendations:  Mechanical soft w/ thin liquids  Frequent/thorough oral care x4 daily   ST to f/u while IP, pt would benefit from intensive post-acute ST

## 2024-06-13 LAB
ANION GAP SERPL CALCULATED.3IONS-SCNC: 6 MMOL/L (ref 4–13)
ATRIAL RATE: 86 BPM
BASOPHILS # BLD AUTO: 0.06 THOUSANDS/ÂΜL (ref 0–0.1)
BASOPHILS NFR BLD AUTO: 1 % (ref 0–1)
BUN SERPL-MCNC: 10 MG/DL (ref 5–25)
CALCIUM SERPL-MCNC: 8.3 MG/DL (ref 8.4–10.2)
CHLORIDE SERPL-SCNC: 104 MMOL/L (ref 96–108)
CO2 SERPL-SCNC: 29 MMOL/L (ref 21–32)
CREAT SERPL-MCNC: 0.77 MG/DL (ref 0.6–1.3)
EOSINOPHIL # BLD AUTO: 0.09 THOUSAND/ÂΜL (ref 0–0.61)
EOSINOPHIL NFR BLD AUTO: 1 % (ref 0–6)
ERYTHROCYTE [DISTWIDTH] IN BLOOD BY AUTOMATED COUNT: 14.6 % (ref 11.6–15.1)
GFR SERPL CREATININE-BSD FRML MDRD: 87 ML/MIN/1.73SQ M
GLUCOSE SERPL-MCNC: 100 MG/DL (ref 65–140)
GLUCOSE SERPL-MCNC: 162 MG/DL (ref 65–140)
HCT VFR BLD AUTO: 36.5 % (ref 36.5–49.3)
HGB BLD-MCNC: 11.7 G/DL (ref 12–17)
IMM GRANULOCYTES # BLD AUTO: 0.05 THOUSAND/UL (ref 0–0.2)
IMM GRANULOCYTES NFR BLD AUTO: 1 % (ref 0–2)
LYMPHOCYTES # BLD AUTO: 1.16 THOUSANDS/ÂΜL (ref 0.6–4.47)
LYMPHOCYTES NFR BLD AUTO: 11 % (ref 14–44)
MCH RBC QN AUTO: 28.7 PG (ref 26.8–34.3)
MCHC RBC AUTO-ENTMCNC: 32.1 G/DL (ref 31.4–37.4)
MCV RBC AUTO: 90 FL (ref 82–98)
MONOCYTES # BLD AUTO: 0.84 THOUSAND/ÂΜL (ref 0.17–1.22)
MONOCYTES NFR BLD AUTO: 8 % (ref 4–12)
NEUTROPHILS # BLD AUTO: 7.98 THOUSANDS/ÂΜL (ref 1.85–7.62)
NEUTS SEG NFR BLD AUTO: 78 % (ref 43–75)
NRBC BLD AUTO-RTO: 0 /100 WBCS
P AXIS: 73 DEGREES
PLATELET # BLD AUTO: 141 THOUSANDS/UL (ref 149–390)
PMV BLD AUTO: 10.1 FL (ref 8.9–12.7)
POTASSIUM SERPL-SCNC: 3.1 MMOL/L (ref 3.5–5.3)
PR INTERVAL: 244 MS
PROCALCITONIN SERPL-MCNC: 0.5 NG/ML
QRS AXIS: 172 DEGREES
QRSD INTERVAL: 142 MS
QT INTERVAL: 438 MS
QTC INTERVAL: 524 MS
RBC # BLD AUTO: 4.08 MILLION/UL (ref 3.88–5.62)
SODIUM SERPL-SCNC: 139 MMOL/L (ref 135–147)
T WAVE AXIS: 59 DEGREES
VENTRICULAR RATE: 86 BPM
WBC # BLD AUTO: 10.18 THOUSAND/UL (ref 4.31–10.16)

## 2024-06-13 PROCEDURE — 82948 REAGENT STRIP/BLOOD GLUCOSE: CPT

## 2024-06-13 PROCEDURE — 93010 ELECTROCARDIOGRAM REPORT: CPT | Performed by: INTERNAL MEDICINE

## 2024-06-13 PROCEDURE — 92526 ORAL FUNCTION THERAPY: CPT

## 2024-06-13 PROCEDURE — 99233 SBSQ HOSP IP/OBS HIGH 50: CPT | Performed by: INTERNAL MEDICINE

## 2024-06-13 PROCEDURE — 80048 BASIC METABOLIC PNL TOTAL CA: CPT | Performed by: INTERNAL MEDICINE

## 2024-06-13 PROCEDURE — 84145 PROCALCITONIN (PCT): CPT | Performed by: INTERNAL MEDICINE

## 2024-06-13 PROCEDURE — 85025 COMPLETE CBC W/AUTO DIFF WBC: CPT | Performed by: INTERNAL MEDICINE

## 2024-06-13 RX ORDER — FUROSEMIDE 20 MG/1
20 TABLET ORAL 2 TIMES DAILY
Status: DISCONTINUED | OUTPATIENT
Start: 2024-06-14 | End: 2024-06-15 | Stop reason: HOSPADM

## 2024-06-13 RX ORDER — POTASSIUM CHLORIDE 20 MEQ/1
40 TABLET, EXTENDED RELEASE ORAL ONCE
Status: COMPLETED | OUTPATIENT
Start: 2024-06-13 | End: 2024-06-13

## 2024-06-13 RX ORDER — FUROSEMIDE 20 MG/1
20 TABLET ORAL 2 TIMES DAILY
Status: DISCONTINUED | OUTPATIENT
Start: 2024-06-13 | End: 2024-06-13

## 2024-06-13 RX ADMIN — METOPROLOL SUCCINATE 100 MG: 50 TABLET, EXTENDED RELEASE ORAL at 08:31

## 2024-06-13 RX ADMIN — MULTIPLE VITAMINS W/ MINERALS TAB 1 TABLET: TAB ORAL at 08:31

## 2024-06-13 RX ADMIN — ASPIRIN 81 MG: 81 TABLET, COATED ORAL at 08:31

## 2024-06-13 RX ADMIN — AMPICILLIN SODIUM AND SULBACTAM SODIUM 3 G: 2; 1 INJECTION, POWDER, FOR SOLUTION INTRAMUSCULAR; INTRAVENOUS at 17:19

## 2024-06-13 RX ADMIN — INSULIN LISPRO 1 UNITS: 100 INJECTION, SOLUTION INTRAVENOUS; SUBCUTANEOUS at 11:01

## 2024-06-13 RX ADMIN — ATORVASTATIN CALCIUM 40 MG: 40 TABLET, FILM COATED ORAL at 17:19

## 2024-06-13 RX ADMIN — HEPARIN SODIUM 5000 UNITS: 5000 INJECTION, SOLUTION INTRAVENOUS; SUBCUTANEOUS at 14:28

## 2024-06-13 RX ADMIN — AMPICILLIN SODIUM AND SULBACTAM SODIUM 3 G: 2; 1 INJECTION, POWDER, FOR SOLUTION INTRAMUSCULAR; INTRAVENOUS at 21:32

## 2024-06-13 RX ADMIN — AMPICILLIN SODIUM AND SULBACTAM SODIUM 3 G: 2; 1 INJECTION, POWDER, FOR SOLUTION INTRAMUSCULAR; INTRAVENOUS at 03:50

## 2024-06-13 RX ADMIN — VALACYCLOVIR HYDROCHLORIDE 500 MG: 500 TABLET, FILM COATED ORAL at 08:31

## 2024-06-13 RX ADMIN — HEPARIN SODIUM 5000 UNITS: 5000 INJECTION, SOLUTION INTRAVENOUS; SUBCUTANEOUS at 05:09

## 2024-06-13 RX ADMIN — GUAIFENESIN 600 MG: 600 TABLET ORAL at 17:19

## 2024-06-13 RX ADMIN — PANTOPRAZOLE SODIUM 40 MG: 40 TABLET, DELAYED RELEASE ORAL at 05:09

## 2024-06-13 RX ADMIN — HEPARIN SODIUM 5000 UNITS: 5000 INJECTION, SOLUTION INTRAVENOUS; SUBCUTANEOUS at 21:32

## 2024-06-13 RX ADMIN — POTASSIUM CHLORIDE 40 MEQ: 1500 TABLET, EXTENDED RELEASE ORAL at 08:31

## 2024-06-13 RX ADMIN — AMPICILLIN SODIUM AND SULBACTAM SODIUM 3 G: 2; 1 INJECTION, POWDER, FOR SOLUTION INTRAMUSCULAR; INTRAVENOUS at 10:20

## 2024-06-13 RX ADMIN — GUAIFENESIN 600 MG: 600 TABLET ORAL at 08:31

## 2024-06-13 RX ADMIN — FERROUS SULFATE TAB 325 MG (65 MG ELEMENTAL FE) 325 MG: 325 (65 FE) TAB at 08:31

## 2024-06-13 NOTE — ASSESSMENT & PLAN NOTE
Wt Readings from Last 3 Encounters:   05/21/24 71.8 kg (158 lb 3.2 oz)   05/15/24 70.8 kg (156 lb)   02/28/24 72.3 kg (159 lb 6.4 oz)     Appears dry to euvolemic  Monitor weights, I's and O's  Hold Lasix today.  Patient appears dry  Restart lasix AM

## 2024-06-13 NOTE — ASSESSMENT & PLAN NOTE
Senting with shortness of breath, fever chills, cough  History of dysphagia  Leukocytosis, RR 20, requiring 2 L of oxygen  S/p cefepime and vancomycin in ED  Check urine strep, Legionella antigen  Sputum, blood cultures  Patient has allergies to ceftin- very bad hives, has tolerated zosyn before. Placed on unasyn- D2  Speech eval

## 2024-06-13 NOTE — ASSESSMENT & PLAN NOTE
VBS ordered  Reviewed findings today  Had extensive discussion with patient this morning, he stated does not want to change the diet, no tube feeding.  Understands the risks for aspiration and possible compliactions with it. Agreeable to keep the code status still as level- 1

## 2024-06-13 NOTE — SPEECH THERAPY NOTE
Speech Language/Pathology    Speech/Language Pathology Progress Note    Patient Name: Gabe Pickard  Today's Date: 6/13/2024     Problem List  Principal Problem:    Acute respiratory failure with hypoxia (HCC)  Active Problems:    Type 2 diabetes mellitus without complication, without long-term current use of insulin (HCC)    Multiple myeloma not having achieved remission (HCC)    Chronic diastolic congestive heart failure (HCC)    Pneumonia    Dysphagia       Past Medical History  Past Medical History:   Diagnosis Date    Acute on chronic systolic (congestive) heart failure (HCC) 05/15/2024    Acute respiratory failure with hypoxia (HCC) 04/20/2022    Arthritis     Cancer (HCC)     Cataract     Colitis     Colon polyp     Fatty liver     GERD (gastroesophageal reflux disease)     History of chemotherapy     History of radiation therapy     History of shingles 2018    History of transfusion     Hyperlipidemia     Sepsis without acute organ dysfunction (HCC) 02/04/2023    Shingles 2017        Past Surgical History  Past Surgical History:   Procedure Laterality Date    AORTIC VALVE REPLACEMENT      CARDIAC VALVE REPLACEMENT  2014    aorta    CATARACT EXTRACTION Bilateral     COLECTOMY      COLONOSCOPY  11/2019    Prior right hemicolectomy    CORONARY ARTERY BYPASS GRAFT      DENTAL SURGERY      JOINT REPLACEMENT  January 2019    left knee    KNEE SURGERY      LYMPH NODE BIOPSY  2003    LYMPHADENECTOMY      OTHER SURGICAL HISTORY      MAZE PROCEDURE    NJ ARTHRP KNE CONDYLE&PLATU MEDIAL&LAT COMPARTMENTS Left 01/28/2019    Procedure: ARTHROPLASTY LEFT KNEE TOTAL;  Surgeon: Darell Delgado MD;  Location:  MAIN OR;  Service: Orthopedics    NJ LARYNGOSCOPY DIRECT OPERATIVE W/BIOPSY Right 04/27/2022    Procedure: DIRECT LARYNGOSCOPY WITH BIOPSY RIGHT PHARYNX, POSSIBLE RIGHT NECK LYMPH NODE BIOPSY; FROZEN SECTION;  Surgeon: Kevin Hardin MD;  Location:  MAIN OR;  Service: ENT    SKIN BIOPSY      UPPER GASTROINTESTINAL  "ENDOSCOPY  11/2019    Schatzki ring    US GUIDED LYMPH NODE BIOPSY RIGHT  12/14/2021         Subjective:  Pt OOB in recliner, lunch tray set-up.  \"I have some questions for ya!\"    Current Diet:  [facility] soft/thin liquids    Objective:  Pt seen for dx dysphagia tx f/u. Pt w/ several relevant questions re: diet preparation and rationale for small, soft solids w/ added moisture. Questions answered to the best of ST ability/pt satisfaction w/ understanding. [facility] tuna salad thin liquids. Mildly prolonged mastication of soft solids though material is fully cleared. Pt takes small single sips of liquid between bites. Swallows suspected delayed. Noted mult swallows per bolus, suspect retention. No overt s/s aspiration w/ material today. Briefly reviewed ongoing aspiration which pt recalls. Pt continues to accept to risk of aspiration w/ PO intake.     Assessment:  Pt w/ good recall of MBS findings, diet recommendations/preparation, and risk of aspiration. PT appears to be well-tolerating current diet, no overt s/s aspiration today though cannot exclude silent aspiration as visualized on MBS yesterday.     Plan/Recommendations:  Continue current diet  Frequent/thorough oral care  ST to f/u as able/appropriate   "

## 2024-06-13 NOTE — CASE MANAGEMENT
Case Management Assessment & Discharge Planning Note    Patient name Gabe Pickard  Location /-01 MRN [id number]  : 1945 Date 2024       Current Admission Date: 2024  Current Admission Diagnosis:Acute respiratory failure with hypoxia ([facility])   Patient Active Problem List    Diagnosis Date Noted Date Diagnosed    Pneumonia 2024     Dysphagia 2024     Platelets decreased (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) 2022     Glossitis 2022     Chest pain 2022     Mouth ulcers 2021     Iron deficiency anemia 2021     Gastroesophageal reflux disease with esophagitis 2021     Roberts's esophagus without dysplasia 2019     Schatzki's ring of distal esophagus 2019     Pharyngeal dysphagia 10/11/2019     Personal history of colon cancer 10/11/2019     Paroxysmal atrial fibrillation (HCC)      Leukocytosis 2019     Chronic diastolic congestive heart failure (HCC) 2019     History of aortic valve replacement with bioprosthetic valve 2019     S/P total knee arthroplasty, left 2019     Essential hypertension 2018     Hx of CABG      Primary localized osteoarthritis of right knee 2018     Multiple myeloma not having achieved remission ([facility]) 2018     Basal cell carcinoma of skin 2018     Erectile dysfunction 2017     Coronary artery disease involving native heart with angina pectoris, unspecified vessel or lesion type ([facility]) 10/07/2014     Malignant tumor of cecum ([facility]) 2013     Type 2 diabetes mellitus without complication, without long-term current use of insulin (HCC) 2012     Fatty liver 2012     Lymphoma (HCC) 2012     Dyslipidemia 2012       LOS (days): 1  Geometric Mean LOS (GMLOS) (days): 4.1  Days  to GMLOS:2.8     OBJECTIVE:    Risk of Unplanned Readmission Score: 17.94         Current admission status: Inpatient       Preferred Pharmacy:   [id number] [place] - [address]  [address] [place] [address]  Phone: [phone] Fax: [phone]    Primary Care Provider: Radha Pettit DO    Primary Insurance: [employer] REP  Secondary Insurance:     ASSESSMENT:  Active Health Care Proxies       Pavel Pickard Alternate Health Care Agent - Spouse   Primary Phone: [phone] (Mobile)  Home Phone: [phone]                 Advance Directives  Does patient have a Health Care POA?: Yes  Does patient have Advance Directives?: Yes  Advance Directives: Living will, Power of  for health care         Readmission Root Cause  30 Day Readmission: No    Patient Information  Admitted from:: Home  Mental Status: Alert  During Assessment patient was accompanied by: Spouse  Assessment information provided by:: Patient, Spouse  Primary Caregiver: Self  Support Systems: Self, Spouse/significant other, Family members  County of Residence: [place]  What city do you live in?: [place]  Home entry access options. Select all that apply.: Stairs  Number of steps to enter home.: 1  Do the steps have railings?: No  Type of Current Residence: 2 story home  Upon entering residence, is there a bedroom on the main floor (no further steps)?: No  A bedroom is located on the following floor levels of residence (select all that apply):: 2nd Floor  Upon entering residence, is there a bathroom on the main floor (no further steps)?: Yes  Number of steps to 2nd floor from main floor: One Flight  Living Arrangements: Lives w/ Spouse/significant other  Is patient a ?: No    Activities of Daily Living Prior to Admission  Functional Status: Independent  Ambulates independently?: Yes  Does patient use assisted devices?: No  Does patient currently own DME?: No  Does patient have a  history of Outpatient Therapy (PT/OT)?: No  Does the patient have a history of Short-Term Rehab?: No  Does patient have a history of HHC?: No  Does patient currently have HHC?: No         Patient Information Continued  Income Source: Employed  Does patient have prescription coverage?: Yes  Does patient receive dialysis treatments?: No  Does patient have a history of substance abuse?: No  Does patient have a history of Mental Health Diagnosis?: No         Means of Transportation  Means of Transport to [facility]:: Drives Self      Social Determinants of Health (SDOH)      Flowsheet Row Most Recent Value   Housing Stability    In the last 12 months, was there a time when you were not able to pay the mortgage or rent on time? N   In the past 12 months, how many times have you moved where you were living? 0   At any time in the past 12 months, were you homeless or living in a shelter (including now)? N   Transportation Needs    In the past 12 months, has lack of transportation kept you from medical appointments or from getting medications? no   In the past 12 months, has lack of transportation kept you from meetings, work, or from getting things needed for daily living? No   Food Insecurity    Within the past 12 months, you worried that your food would run out before you got the money to buy more. Never true   Within the past 12 months, the food you bought just didn't last and you didn't have money to get more. Never true            DISCHARGE DETAILS:    Discharge planning discussed with:: patient and his wifePavel        CM contacted family/caregiver?: Yes  Were Treatment Team discharge recommendations reviewed with patient/caregiver?: Yes  Did patient/caregiver verbalize understanding of patient care needs?: Yes  Were patient/caregiver advised of the risks associated with not following Treatment Team discharge recommendations?: Yes     Met with patient with his wife, Pavel present to review the role of CM and discuss needs  patient may have prior to discharge.  Patient resides with his wife in a 2 story home with 1 MIGUEL ANGEL. He reports being independent of ADL's and uses no assistive device. He has no DME at home. He denies C./D&A/U admission. He plans to return home and anticipates no discharge needs. His wife will transport him home.

## 2024-06-13 NOTE — PROGRESS NOTES
[facility]  Progress Note  Name: Gabe Pickard I  MRN: [id number]  Unit/Bed#: -01 I Date of Admission: 6/12/2024   Date of Service: 6/13/2024 I Hospital Day: 1    Assessment & Plan   * Acute respiratory failure with hypoxia (HCC)  Assessment & Plan  Oxygen requirement at time of admission: 2l - Continue to wean as tolerated  Secondary to: Pneumonia  COVID/flu/RSV negative   mild elevation  Continue to treat underlying cause, see further management below  Monitor vital signs, SpO2  Plan as in pneumonia    Dysphagia  Assessment & Plan  VBS ordered  Reviewed findings today  Had extensive discussion with patient this morning, he stated does not want to change the diet, no tube feeding.  Understands the risks for aspiration and possible compliactions with it. Agreeable to keep the code status still as level- 1    Pneumonia  Assessment & Plan  Senting with shortness of breath, fever chills, cough  History of dysphagia  Leukocytosis, RR 20, requiring 2 L of oxygen  S/p cefepime and vancomycin in ED  Check urine strep, Legionella antigen  Sputum, blood cultures  Patient has allergies to ceftin- very bad hives, has tolerated zosyn before. Placed on unasyn- D2  Speech eval    Chronic diastolic congestive heart failure (HCC)  Assessment & Plan  Wt Readings from Last 3 Encounters:   05/21/24 71.8 kg (158 lb 3.2 oz)   05/15/24 70.8 kg (156 lb)   02/28/24 72.3 kg (159 lb 6.4 oz)     Appears dry to euvolemic  Monitor weights, I's and O's  Hold Lasix today.  Patient appears dry  Restart lasix AM          Multiple myeloma not having achieved remission (HCC)  Assessment & Plan  Currently receiving chemo once a month, IVIG once a month  Continue follow-up with outpatient oncology    Type 2 diabetes mellitus without complication, without long-term current use of insulin (HCC)  Assessment & Plan  Lab Results   Component Value Date    HGBA1C 5.8 (H) 06/12/2024       Recent Labs      24  1705 24  0729 24  1052 24  1626   POCGLU 109 100 162* 100       Blood Sugar Average: Last 72 hrs:  (P) 117.75  On metformin, Jardiance at home  SSI  Diabetic diet           VTE  Prophylaxis:   Pharmacologic: in place  Mechanical VTE Prophylaxis in Place: Yes    Patient Centered Rounds: I have performed bedside rounds with nursing staff today.    Discussions with Specialists or Other Care Team Provider: case management    Education and Discussions with Family / Patient: patient, wife over phone    Mobility:   Basic Mobility Inpatient Raw Score: 24  JH-HLM Goal: 8: Walk 250 feet or more  JH-HLM Achieved: 8: Walk 250 feet ot more  JH-HLM Goal achieved. Continue to encourage appropriate mobility.    Total Time Spent on Date of Encounter in care of patient: 55 mins. This time was spent on one or more of the following: performing physical exam; counseling and coordination of care; obtaining or reviewing history; documenting in the medical record; reviewing/ordering tests, medications or procedures; communicating with other healthcare professionals and discussing with patient's family/caregivers.      Current Length of Stay: 1 day(s)    Current Patient Status: Inpatient        Code Status: Level 1 - Full Code    Discharge Plan: Pt will require continued inpatient hospitalization.    Subjective:   Feels breathing is better, weaned off O2 this morning    Patient is seen and examined at bedside.  All other ROS are negative.    Objective:     Vitals:   Temp (24hrs), Av.7 °F (37.6 °C), Min:99.1 °F (37.3 °C), Max:100.9 °F (38.3 °C)    Temp:  [99.1 °F (37.3 °C)-100.9 °F (38.3 °C)] 100.9 °F (38.3 °C)  HR:  [69-79] 79  Resp:  [18-23] 20  BP: (125-148)/(49-65) 135/64  SpO2:  [95 %-98 %] 95 %  There is no height or weight on file to calculate BMI.     Input and Output Summary (last 24 hours):       Intake/Output Summary (Last 24 hours) at 2024 1650  Last data filed at 2024 1054  Gross per 24  hour   Intake 621 ml   Output 750 ml   Net -129 ml       Physical Exam:       GEN: No acute distress, comfortable  HEEENT: No JVD, PERRLA, no scleral icterus  RESP: Lungs rhonchi+  CV: RRR, +s1/s2   ABD: SOFT NON TENDER, POSITIVE BOWEL SOUNDS, NO DISTENTION  PSYCH: CALM  NEURO: Mentation baseline, NO FOCAL DEFICITS  SKIN: NO RASH  EXTREM: NO EDEMA    Additional Data:     Labs:    Results from last 7 days   Lab Units 06/13/24  0248   WBC Thousand/uL 10.18*   HEMOGLOBIN g/dL 11.7*   HEMATOCRIT % 36.5   PLATELETS Thousands/uL 141*   SEGS PCT % 78*   LYMPHO PCT % 11*   MONO PCT % 8   EOS PCT % 1     Results from last 7 days   Lab Units 06/13/24  0248 06/12/24  0647   SODIUM mmol/L 139 139   POTASSIUM mmol/L 3.1* 3.3*   CHLORIDE mmol/L 104 100   CO2 mmol/L 29 28   BUN mg/dL 10 14   CREATININE mg/dL 0.77 0.95   ANION GAP mmol/L 6 11   CALCIUM mg/dL 8.3* 9.4   ALBUMIN g/dL  --  3.9   TOTAL BILIRUBIN mg/dL  --  0.70   ALK PHOS U/L  --  114*   ALT U/L  --  21   AST U/L  --  29   GLUCOSE RANDOM mg/dL 100 175*     Results from last 7 days   Lab Units 06/12/24  0647   INR  1.09     Results from last 7 days   Lab Units 06/13/24  1626 06/13/24  1052 06/13/24  0729 06/12/24  1705   POC GLUCOSE mg/dl 100 162* 100 109     Results from last 7 days   Lab Units 06/12/24  1605   HEMOGLOBIN A1C % 5.8*     Results from last 7 days   Lab Units 06/13/24  0248 06/12/24  0647   LACTIC ACID mmol/L  --  1.5   PROCALCITONIN ng/ml 0.50* 0.40*       Lines/Drains:  Invasive Devices       Peripheral Intravenous Line  Duration             Peripheral IV 06/12/24 Proximal;Right;Ventral (anterior) Forearm 1 day                    Telemetry:        * I Have Reviewed All Lab Data Listed Above.           Imaging:     Results for orders placed during the hospital encounter of 06/12/24    XR chest portable    Narrative  XR CHEST PORTABLE    INDICATION: shortness of breath.    COMPARISON: Chest radiograph on 11/18/2023    FINDINGS:  Large airspace opacity  within the right lung.    No pneumothorax or pleural effusion.    Normal cardiomediastinal silhouette.  Sternotomy with aortic valve replacement.    Severe left glenohumeral joint degenerative change.    Normal upper abdomen.    Impression  Large airspace opacity in the right lung consistent with pneumonia.    Findings concur with the preliminary report by the referring clinician already in PACS and/or our electronic record EPIC.      Workstation performed: [id number]    Results for orders placed during the hospital encounter of 05/02/23    XR chest pa & lateral    Narrative  CHEST    INDICATION:   R50.9: Fever, unspecified.    COMPARISON: 3/23/2023    EXAM PERFORMED/VIEWS:  XR CHEST PA & LATERAL The frontal view was performed utilizing dual energy radiographic technique.  Images: 4    FINDINGS: There are median sternotomy wires indicating prior cardiac surgery. Prosthetic aortic valve.    Cardiomediastinal silhouette appears unremarkable.    The lungs are clear.  No pneumothorax or pleural effusion.    No acute osseous abnormalities.    Impression  No acute cardiopulmonary disease.            Workstation performed: [id number]      *I have reviewed all imaging reports listed above      Recent Cultures (last 7 days):     Results from last 7 days   Lab Units 06/12/24  1556 06/12/24  0651 06/12/24  0647   BLOOD CULTURE   --  No Growth at 24 hrs. No Growth at 24 hrs.   LEGIONELLA URINARY ANTIGEN  Negative  --   --        Last 24 Hours Medication List:   Current Facility-Administered Medications   Medication Dose Route Frequency Provider Last Rate    ampicillin-sulbactam  3 g Intravenous Q6H Paige Fofana MD 3 g (06/13/24 1020)    aspirin  81 mg Oral Daily Paige Fofana MD      atorvastatin  40 mg Oral Daily With Dinner Paige Fofana MD      ferrous sulfate  325 mg Oral Daily With Breakfast Paige Fofana MD      [START ON 6/14/2024] furosemide  20 mg Oral BID Paige Fofana MD      guaiFENesin  600 mg Oral  BID Paige Fofana MD      heparin (porcine)  5,000 Units Subcutaneous Q8H DONNIE Paige Fofana MD      insulin lispro  1-5 Units Subcutaneous TID AC Paige Fofana MD      metoprolol succinate  100 mg Oral Daily Paige Fofana MD      multivitamin-minerals  1 tablet Oral Daily Paige Fofana MD      pantoprazole  40 mg Oral Early Morning Paige Fofana MD      valACYclovir  500 mg Oral Daily Paige Fofana MD          Today, Patient Was Seen By: Paige Fofana MD    ** Please Note: Dictation voice to text software may have been used in the creation of this document. **

## 2024-06-13 NOTE — ASSESSMENT & PLAN NOTE
Lab Results   Component Value Date    HGBA1C 5.8 (H) 06/12/2024       Recent Labs     06/12/24  1705 06/13/24  0729 06/13/24  1052 06/13/24  1626   POCGLU 109 100 162* 100       Blood Sugar Average: Last 72 hrs:  (P) 117.75  On metformin, Jardiance at home  SSI  Diabetic diet

## 2024-06-13 NOTE — UTILIZATION REVIEW
Initial Clinical Review    Admission: Date/Time/Statement:   Admission Orders (From admission, onward)       Ordered        06/12/24 0745  INPATIENT ADMISSION  Once                          Orders Placed This Encounter   Procedures    INPATIENT ADMISSION     Standing Status:   Standing     Number of Occurrences:   1     Order Specific Question:   Level of Care     Answer:   Med Surg [16]     Order Specific Question:   Estimated length of stay     Answer:   More than 2 Midnights     Order Specific Question:   Certification     Answer:   I certify that inpatient services are medically necessary for this patient for a duration of greater than two midnights. See H&P and MD Progress Notes for additional information about the patient's course of treatment.     ED Arrival Information       Expected   -    Arrival   6/12/2024 06:29    Acuity   Urgent              Means of arrival   Walk-In    Escorted by   Self    Service   Hospitalist    Admission type   Emergency              Arrival complaint   Fever/ difficult breathing             Chief Complaint   Patient presents with    Shortness of Breath     Pt reports to er via walkin pt reports he thinks he has pneumonia   , pt reports he is getting chemo and had a pulse ox between 85-88       Initial Presentation: 78 y.o. male  to ED via walk in from home.    Admitted to inpatient with Dx: acute respiratory failure with hypoxia/Dysphagia/Pneumonia/chronic CHF/Multiple Myeloma on chemo/T2 DM.   Presented to ED with cough and fever starting night prior to arrival.   Shortness of breath and sat is 85 to 88% room air.  PMHx:multiple myeloma, hypertension, hyperlipidemia, diabetes, CHF . On exam: systolic murmur.   Lungs rales.  .  Procalcitonin 0.40.   wbc 18.  K 3.3.   glucose 175.      Imaging shows pneumonia of right lung. ED treatment:  placed on oxygen.  Started cefepime and vancomycin.    Plan includes speech evaluation for complaint of dysphagia.  Continue antibiotics  and switch to Unasyn.  Monitor oxygen sats.  Follow cultures.  Hold lasix today and evaluate tomorrow as appears dry.  Trend BMP and replete electrolytes.  Hold home Jardiance and metformin.  Start SSI     Anticipated Length of Stay/Certification Statement:  Patient will be admitted on an inpatient basis with an anticipated length of stay of greater than 2 midnights secondary to pna.     Date: 6/13/24    Day 2: febrile to 100.9 .  Non productive cough.  On exam: remains on oxygen 2 liters.  Lungs diminished breath sounds, right side coarse.   Incentive spirometry 750 ml and goal is 1250 ml.  Procalcitonin 0.50.   wbc 10.80.  K 3.1.  blood cultures no growth at 24 hours.  Continue Unasyn, Mucinex.   Wean oxygen as able.  Continue SSI    ED Triage Vitals   Temperature Pulse Respirations Blood Pressure SpO2 Pain Score   06/12/24 0635 06/12/24 0635 06/12/24 0635 06/12/24 0635 06/12/24 0635 06/12/24 0914   98.5 °F (36.9 °C) 89 20 134/60 (!) 88 % No Pain     Weight (last 2 days)       None            Vital Signs (last 3 days)       Date/Time Temp Pulse Resp BP MAP (mmHg) SpO2 Calculated FIO2 (%) - Nasal Cannula Nasal Cannula O2 Flow Rate (L/min) O2 Device Patient Position - Orthostatic VS Pain    06/13/24 14:34:27 100.9 °F (38.3 °C) 79 20 135/64 88 95 % -- -- None (Room air) Sitting --    06/13/24 0800 -- -- -- -- -- -- 28 2 L/min Nasal cannula -- No Pain    06/13/24 07:31:50 99.6 °F (37.6 °C) 74 23 148/65 93 98 % -- -- None (Room air) Lying --    06/12/24 2236 99.1 °F (37.3 °C) -- -- -- -- -- -- -- -- -- --    06/12/24 22:25:57 99.1 °F (37.3 °C) 69 18 125/49 74 95 % 28 2 L/min Nasal cannula Lying No Pain    06/12/24 16:36:20 99.8 °F (37.7 °C) 68 22 133/57 82 98 % 28 2 L/min -- Lying --    06/12/24 1620 -- -- -- -- -- -- 28 2 L/min Nasal cannula -- --    06/12/24 12:33:25 -- 78 -- 152/66 95 98 % -- -- -- -- --    06/12/24 09:14:54 98.7 °F (37.1 °C) 74 20 134/61 85 97 % 28 2 L/min Nasal cannula Lying No Pain    06/12/24  09:14:41 98.7 °F (37.1 °C) 75 20 134/61 85 97 % -- -- -- -- --    06/12/24 0800 -- 76 20 139/65 93 96 % -- -- -- -- --    06/12/24 0730 -- 80 20 126/60 87 94 % -- -- -- -- --    06/12/24 0700 -- 82 20 117/59 84 94 % -- -- Nasal cannula -- --    06/12/24 0635 98.5 °F (36.9 °C) 89 20 134/60 85 88 % -- -- None (Room air) -- --              Pertinent Labs/Diagnostic Test Results:   Radiology:  FL barium swallow video w speech   Final Interpretation by SYSTEMGENERATED, DOCUMENTATION (06/12 1443)   Pt w/ mild oral, severe pharyngeal dysphagia. Mildly prolonged mastication/oral organization. Fairly brisk transfers though mild diffuse residue. Severely delayed swallow initiation w/ material pooling in the pyriforms prior to initiation. Epiglottis is unable to invert 2/2 contact w/ PPW due to curvature of cervical spine/pharyngeal mass. Minimal hyo-laryngeal elevation w/ incomplete laryngeal vestibule closure resulting in deep penetration w/ subsequent aspiration of all liquid consistencies. Pt w/ inconsistent response to aspiration events (strong cough w/ thin, throat clear or silent w/ thickened liquids). Unfortunately, strategies not effective in improving airway protection at this time. Significant pharyngeal retention is noted w/ solids, not cleared w/ mult swallows despite max attempts   Diet: Deferred at this time, f/u w/ pt/spouse to discuss options and associated risks/benefits   Meds: Non-oral if possible   Strategies: -   Frequent oral care  Upright position  F/u ST tx: Yes   Therapy Prognosis:Poor   Prognosis considerations: Age, progressive disease process, severity of deficits   Full/Intermittent Supervision  Aspiration Precautions  Reflux Precautions  Consider consult with: ?GI for alt means of nutrition if within GOC, ?palliative care   Repeat MBS as necessary  If a dedicated assessment of the esophagus is desired, consider esophagram/barium swallow or EGD   XR chest portable   ED Interpretation by Emeli  Clair Horne MD (06/12 0704)   Abnormal   Multifocal pneumonia of the right lung fields.      Final Interpretation by Micky Zazueta MD (06/12 0844)      Large airspace opacity in the right lung consistent with pneumonia.      Findings concur with the preliminary report by the referring clinician already in PACS and/or our electronic record EPIC.         Workstation performed: [id number]           Cardiology:  ECG 12 lead   Final Result by Lorna Garcia MD (06/13 0017)   Sinus rhythm with 1st degree A-V block   Non-specific intra-ventricular conduction block   Abnormal ECG   When compared with ECG of 18-NOV-2023 19:04,   Sinus rhythm has replaced Wide QRS rhythm   Confirmed by Lorna Garcia ([id number]) on 6/13/2024 12:17:47 AM        GI:  No orders to display       Results from last 7 days   Lab Units 06/12/24  0647   SARS-COV-2  Negative     Results from last 7 days   Lab Units 06/13/24  0248 06/12/24  0647   WBC Thousand/uL 10.18* 18.00*   HEMOGLOBIN g/dL 11.7* 13.9   HEMATOCRIT % 36.5 42.2   PLATELETS Thousands/uL 141* 174   TOTAL NEUT ABS Thousands/µL 7.98* 15.59*     Results from last 7 days   Lab Units 06/13/24  0248 06/12/24  0647   SODIUM mmol/L 139 139   POTASSIUM mmol/L 3.1* 3.3*   CHLORIDE mmol/L 104 100   CO2 mmol/L 29 28   ANION GAP mmol/L 6 11   BUN mg/dL 10 14   CREATININE mg/dL 0.77 0.95   EGFR ml/min/1.73sq m 87 76   CALCIUM mg/dL 8.3* 9.4     Results from last 7 days   Lab Units 06/12/24  0647   AST U/L 29   ALT U/L 21   ALK PHOS U/L 114*   TOTAL PROTEIN g/dL 6.8   ALBUMIN g/dL 3.9   TOTAL BILIRUBIN mg/dL 0.70     Results from last 7 days   Lab Units 06/13/24  1052 06/13/24  0729 06/12/24  1705   POC GLUCOSE mg/dl 162* 100 109     Results from last 7 days   Lab Units 06/13/24  0248 06/12/24  0647   GLUCOSE RANDOM mg/dL 100 175*     Results from last 7 days   Lab Units 06/12/24  1605   HEMOGLOBIN A1C % 5.8*   EAG mg/dl 120     Results from last 7 days   Lab Units 06/12/24  1030  06/12/24  1023 06/12/24  0647   HS TNI 0HR ng/L  --   --  28   HS TNI 2HR ng/L 24  --   --    HSTNI D2 ng/L -4  --   --    HS TNI 4HR ng/L  --  25  --    HSTNI D4 ng/L  --  -3  --      Results from last 7 days   Lab Units 06/12/24  0647   PROTIME seconds 14.6*   INR  1.09   PTT seconds 29     Results from last 7 days   Lab Units 06/13/24  0248 06/12/24  0647   PROCALCITONIN ng/ml 0.50* 0.40*     Results from last 7 days   Lab Units 06/12/24  0647   LACTIC ACID mmol/L 1.5     Results from last 7 days   Lab Units 06/12/24  0647   BNP pg/mL 562*     Results from last 7 days   Lab Units 06/12/24  1556 06/12/24  0647   STREP PNEUMONIAE ANTIGEN, URINE  Negative  --    LEGIONELLA URINARY ANTIGEN  Negative  --    INFLUENZA A PCR   --  Negative   INFLUENZA B PCR   --  Negative   RSV PCR   --  Negative       Results from last 7 days   Lab Units 06/12/24  0651 06/12/24  0647   BLOOD CULTURE  No Growth at 24 hrs. No Growth at 24 hrs.         ED Treatment-Medication Administration from 06/12/2024 0628 to 06/12/2024 0857         Date/Time Order Dose Route Action     06/12/2024 0720 cefepime (MAXIPIME) IVPB (premix in dextrose) 2,000 mg 50 mL 2,000 mg Intravenous New Bag     06/12/2024 0752 vancomycin (VANCOCIN) 1750 mg in sodium chloride 0.9% 500 mL IVPB 1,750 mg Intravenous New Bag     06/12/2024 0741 potassium chloride (Klor-Con M20) CR tablet 40 mEq 40 mEq Oral Given     06/12/2024 0743 sodium chloride 0.9 % bolus 500 mL 500 mL Intravenous New Bag            Past Medical History:   Diagnosis Date    Acute on chronic systolic (congestive) heart failure (HCC) 05/15/2024    Acute respiratory failure with hypoxia (HCC) 04/20/2022    Arthritis     Cancer (HCC)     Cataract     Colitis     Colon polyp     Fatty liver     GERD (gastroesophageal reflux disease)     History of chemotherapy     History of radiation therapy     History of shingles 2018    History of transfusion     Hyperlipidemia     Sepsis without acute organ  dysfunction (HCC) 02/04/2023    Shingles 2017     Present on Admission:   Acute respiratory failure with hypoxia (HCC)   Type 2 diabetes mellitus without complication, without long-term current use of insulin (HCC)   Chronic diastolic congestive heart failure (HCC)   Multiple myeloma not having achieved remission ([facility])      Admitting Diagnosis: Acute respiratory insufficiency [R06.89]  Fever [R50.9]  Multifocal pneumonia [J18.9]  Age/Sex: 78 y.o. male  Admission Orders:  Scheduled Medications:  ampicillin-sulbactam, 3 g, Intravenous, Q6H  aspirin, 81 mg, Oral, Daily  atorvastatin, 40 mg, Oral, Daily With Dinner  ferrous sulfate, 325 mg, Oral, Daily With Breakfast  guaiFENesin, 600 mg, Oral, BID  heparin (porcine), 5,000 Units, Subcutaneous, Q8H DONNIE  insulin lispro, 1-5 Units, Subcutaneous, TID AC  metoprolol succinate, 100 mg, Oral, Daily  multivitamin-minerals, 1 tablet, Oral, Daily  pantoprazole, 40 mg, Oral, Early Morning  valACYclovir, 500 mg, Oral, Daily    potassium chloride (Klor-Con M20) CR tablet 40 mEq  Dose: 40 mEq  Freq: Once Route: PO  Start: 06/13/24 0830 End: 06/13/24 0831    Continuous IV Infusions:     PRN Meds:       Aspiration precautions  Incentive spirometry   Speech    Network Utilization Review Department  ATTENTION: Please call with any questions or concerns to [phone] and carefully listen to the prompts so that you are directed to the right person. All voicemails are confidential.   For Discharge needs, contact Care Management DC Support Team at [phone] opt. [phone]  Send all requests for admission clinical reviews, approved or denied determinations and any other requests to dedicated fax number below belonging to the campus where the patient is receiving treatment. List of dedicated fax numbers for the Facilities:  FACILITY NAME UR FAX NUMBER   ADMISSION DENIALS (Administrative/Medical Necessity) [phone]   DISCHARGE SUPPORT TEAM (NETWORK) [phone]   [facility]  (Maternity/NICU/Pediatrics) [phone]   [facility] [phone]   [facility] [phone]   [facility] [phone]   [facility] [phone]   [facility] [phone]   [facility] [phone]   [facility] [phone]   [facility] [phone]   [facility] [phone]   [facility] [phone]   [facility] [phone]   [facility] [phone]

## 2024-06-14 PROBLEM — J69.0 ASPIRATION PNEUMONIA (HCC): Status: ACTIVE | Noted: 2024-06-12

## 2024-06-14 LAB
ANION GAP SERPL CALCULATED.3IONS-SCNC: 6 MMOL/L (ref 4–13)
BASOPHILS # BLD AUTO: 0.06 THOUSANDS/ÂΜL (ref 0–0.1)
BASOPHILS NFR BLD AUTO: 1 % (ref 0–1)
BUN SERPL-MCNC: 10 MG/DL (ref 5–25)
CALCIUM SERPL-MCNC: 8.5 MG/DL (ref 8.4–10.2)
CHLORIDE SERPL-SCNC: 102 MMOL/L (ref 96–108)
CO2 SERPL-SCNC: 28 MMOL/L (ref 21–32)
CREAT SERPL-MCNC: 0.8 MG/DL (ref 0.6–1.3)
EOSINOPHIL # BLD AUTO: 0.09 THOUSAND/ÂΜL (ref 0–0.61)
EOSINOPHIL NFR BLD AUTO: 1 % (ref 0–6)
ERYTHROCYTE [DISTWIDTH] IN BLOOD BY AUTOMATED COUNT: 14.6 % (ref 11.6–15.1)
GFR SERPL CREATININE-BSD FRML MDRD: 86 ML/MIN/1.73SQ M
GLUCOSE SERPL-MCNC: 109 MG/DL (ref 65–140)
GLUCOSE SERPL-MCNC: 123 MG/DL (ref 65–140)
GLUCOSE SERPL-MCNC: 152 MG/DL (ref 65–140)
GLUCOSE SERPL-MCNC: 172 MG/DL (ref 65–140)
GLUCOSE SERPL-MCNC: 98 MG/DL (ref 65–140)
HCT VFR BLD AUTO: 34.9 % (ref 36.5–49.3)
HGB BLD-MCNC: 11.4 G/DL (ref 12–17)
IMM GRANULOCYTES # BLD AUTO: 0.03 THOUSAND/UL (ref 0–0.2)
IMM GRANULOCYTES NFR BLD AUTO: 0 % (ref 0–2)
LYMPHOCYTES # BLD AUTO: 1.22 THOUSANDS/ÂΜL (ref 0.6–4.47)
LYMPHOCYTES NFR BLD AUTO: 15 % (ref 14–44)
MCH RBC QN AUTO: 28.9 PG (ref 26.8–34.3)
MCHC RBC AUTO-ENTMCNC: 32.7 G/DL (ref 31.4–37.4)
MCV RBC AUTO: 88 FL (ref 82–98)
MONOCYTES # BLD AUTO: 0.86 THOUSAND/ÂΜL (ref 0.17–1.22)
MONOCYTES NFR BLD AUTO: 11 % (ref 4–12)
NEUTROPHILS # BLD AUTO: 5.69 THOUSANDS/ÂΜL (ref 1.85–7.62)
NEUTS SEG NFR BLD AUTO: 72 % (ref 43–75)
NRBC BLD AUTO-RTO: 0 /100 WBCS
PLATELET # BLD AUTO: 140 THOUSANDS/UL (ref 149–390)
PMV BLD AUTO: 9.7 FL (ref 8.9–12.7)
POTASSIUM SERPL-SCNC: 3.5 MMOL/L (ref 3.5–5.3)
RBC # BLD AUTO: 3.95 MILLION/UL (ref 3.88–5.62)
SODIUM SERPL-SCNC: 136 MMOL/L (ref 135–147)
WBC # BLD AUTO: 7.95 THOUSAND/UL (ref 4.31–10.16)

## 2024-06-14 PROCEDURE — 99233 SBSQ HOSP IP/OBS HIGH 50: CPT | Performed by: STUDENT IN AN ORGANIZED HEALTH CARE EDUCATION/TRAINING PROGRAM

## 2024-06-14 PROCEDURE — 85025 COMPLETE CBC W/AUTO DIFF WBC: CPT | Performed by: INTERNAL MEDICINE

## 2024-06-14 PROCEDURE — 80048 BASIC METABOLIC PNL TOTAL CA: CPT | Performed by: INTERNAL MEDICINE

## 2024-06-14 PROCEDURE — 92526 ORAL FUNCTION THERAPY: CPT

## 2024-06-14 PROCEDURE — 82948 REAGENT STRIP/BLOOD GLUCOSE: CPT

## 2024-06-14 RX ADMIN — GUAIFENESIN 600 MG: 600 TABLET ORAL at 18:24

## 2024-06-14 RX ADMIN — INSULIN LISPRO 1 UNITS: 100 INJECTION, SOLUTION INTRAVENOUS; SUBCUTANEOUS at 12:37

## 2024-06-14 RX ADMIN — HEPARIN SODIUM 5000 UNITS: 5000 INJECTION, SOLUTION INTRAVENOUS; SUBCUTANEOUS at 05:47

## 2024-06-14 RX ADMIN — VALACYCLOVIR HYDROCHLORIDE 500 MG: 500 TABLET, FILM COATED ORAL at 09:05

## 2024-06-14 RX ADMIN — FERROUS SULFATE TAB 325 MG (65 MG ELEMENTAL FE) 325 MG: 325 (65 FE) TAB at 09:05

## 2024-06-14 RX ADMIN — METOPROLOL SUCCINATE 100 MG: 50 TABLET, EXTENDED RELEASE ORAL at 09:05

## 2024-06-14 RX ADMIN — MULTIPLE VITAMINS W/ MINERALS TAB 1 TABLET: TAB ORAL at 09:05

## 2024-06-14 RX ADMIN — HEPARIN SODIUM 5000 UNITS: 5000 INJECTION, SOLUTION INTRAVENOUS; SUBCUTANEOUS at 15:17

## 2024-06-14 RX ADMIN — FUROSEMIDE 20 MG: 20 TABLET ORAL at 18:24

## 2024-06-14 RX ADMIN — ATORVASTATIN CALCIUM 40 MG: 40 TABLET, FILM COATED ORAL at 16:12

## 2024-06-14 RX ADMIN — AMPICILLIN SODIUM AND SULBACTAM SODIUM 3 G: 2; 1 INJECTION, POWDER, FOR SOLUTION INTRAMUSCULAR; INTRAVENOUS at 21:28

## 2024-06-14 RX ADMIN — PANTOPRAZOLE SODIUM 40 MG: 40 TABLET, DELAYED RELEASE ORAL at 05:47

## 2024-06-14 RX ADMIN — AMPICILLIN SODIUM AND SULBACTAM SODIUM 3 G: 2; 1 INJECTION, POWDER, FOR SOLUTION INTRAMUSCULAR; INTRAVENOUS at 09:41

## 2024-06-14 RX ADMIN — GUAIFENESIN 600 MG: 600 TABLET ORAL at 09:05

## 2024-06-14 RX ADMIN — HEPARIN SODIUM 5000 UNITS: 5000 INJECTION, SOLUTION INTRAVENOUS; SUBCUTANEOUS at 21:28

## 2024-06-14 RX ADMIN — EMPAGLIFLOZIN 20 MG: 10 TABLET, FILM COATED ORAL at 09:41

## 2024-06-14 RX ADMIN — AMPICILLIN SODIUM AND SULBACTAM SODIUM 3 G: 2; 1 INJECTION, POWDER, FOR SOLUTION INTRAMUSCULAR; INTRAVENOUS at 16:13

## 2024-06-14 RX ADMIN — ASPIRIN 81 MG: 81 TABLET, COATED ORAL at 09:05

## 2024-06-14 RX ADMIN — FUROSEMIDE 20 MG: 20 TABLET ORAL at 09:05

## 2024-06-14 RX ADMIN — AMPICILLIN SODIUM AND SULBACTAM SODIUM 3 G: 2; 1 INJECTION, POWDER, FOR SOLUTION INTRAMUSCULAR; INTRAVENOUS at 03:44

## 2024-06-14 NOTE — CASE MANAGEMENT
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     Acute respiratory failure with hypoxia ([facility]) 2022     Glossitis 2022     Chest pain 2022     Mouth ulcers 2021     Iron deficiency anemia 2021     Gastroesophageal reflux disease with esophagitis 2021     Roberts's esophagus without dysplasia 2019     Schatzki's ring of distal esophagus 2019     Pharyngeal dysphagia 10/11/2019     Personal history of colon cancer 10/11/2019     Paroxysmal atrial fibrillation ([facility])      Leukocytosis 2019     Chronic diastolic congestive heart failure ([facility]) 2019     History of aortic valve replacement with bioprosthetic valve 2019     S/P total knee arthroplasty, left 2019     Essential hypertension 2018     Hx of CABG      Primary localized osteoarthritis of right knee 2018     Multiple myeloma not having achieved remission ([facility]) 2018     Basal cell carcinoma of skin 2018     Erectile dysfunction 2017     Coronary artery disease involving native heart with angina pectoris, unspecified vessel or lesion type ([facility]) 10/07/2014     Malignant tumor of cecum ([facility]) 2013     Type 2 diabetes mellitus without complication, without long-term current use of insulin (HCC) 2012     Fatty liver 2012     Lymphoma (HCC) 2012     Dyslipidemia 2012       LOS (days): 2  Geometric Mean LOS (GMLOS) (days): 4.1  Days to GMLOS:2      OBJECTIVE:  Risk of Unplanned Readmission Score: 15.99         Current admission status: Inpatient   Preferred Pharmacy:   [id number] - [place] - [address]  [address] [place] [address]  Phone: [phone] Fax: [phone]    Primary Care Provider: Radha Pettit DO    Primary Insurance: [employer] RE[employer]  Secondary Insurance:     DISCHARGE DETAILS:                                                                                        IMM Given (Date):: 06/14/24  IMM Given to:: Patient     Additional Comments: IMM reviewed with pt at bedside, pt was understanding of his right to appeal his hospital discharge, pt signed the IMM.

## 2024-06-14 NOTE — ASSESSMENT & PLAN NOTE
VBS ordered  Reviewed findings today  Had extensive discussion with patient this morning, he stated does not want to change the diet, no tube feeding.  Understands the risks for aspiration and possible compliactions with it. Agreeable to keep the code status still as level- 1    Continue dysphagia 2 thin    Will need outpt speech therapy on discharge

## 2024-06-14 NOTE — UTILIZATION REVIEW
SEE INITIAL REVIEW AT BOTTOM  Continued Stay Review    Date: 6/14/2024                          Current Patient Class: inpatient  Current Level of Care: med surg    HPI:78 y.o. male initially admitted on 6/12     Assessment/Plan: Patient presents with cough and fever starting night prior to arrival.   Shortness of breath and sat is 85 to 88% room air.  PMHx:multiple myeloma   On exam weaning to room air now; breath sounds diminished;   Abnormal labs or imaging  Diagnosis/Plan     PNA cont IV antibx; speech eval; follow urine antigens, BCX.   Dysphagia SPEECH eval No overt s/s oropharyngeal difficulties or aspiration w/ material today. Cont mechanical soft diet, OP ST   Acute respiratory failure: maintain POX   Chronic diastolic CHF restarted lasix in AM:   Vital Signs (last 3 days)       Date/Time Temp Pulse Resp BP MAP (mmHg) SpO2 Calculated FIO2 (%) - Nasal Cannula Nasal Cannula O2 Flow Rate (L/min) O2 Device Patient Position - Orthostatic VS Pain    06/14/24 1100 -- -- -- -- -- -- -- -- None (Room air) -- No Pain    06/14/24 07:50:38 98.6 °F (37 °C) 74 16 151/66 94 93 % -- -- -- -- --    06/14/24 0549 -- -- -- -- -- -- -- -- None (Room air) -- --    06/14/24 0348 -- -- -- -- -- 91 % 22 0.5 L/min Nasal cannula -- --    06/14/24 0300 -- -- -- -- -- 97 % -- -- None (Room air) -- --    06/14/24 02:29:14 99.1 °F (37.3 °C) 72 17 129/51 77 97 % -- -- -- -- --    06/13/24 2237 -- -- -- -- -- -- 28 2 L/min Nasal cannula -- --    06/13/24 21:45:47 99.3 °F (37.4 °C) 65 -- -- -- 99 % -- -- -- -- --    06/13/24 19:38:24 99.7 °F (37.6 °C) 69 15 154/65 95 98 % -- -- -- -- No Pain    06/13/24 14:34:27 100.9 °F (38.3 °C) 79 20 135/64 88 95 % -- -- None (Room air) Sitting --    06/13/24 0800 -- -- -- -- -- -- 28 2 L/min Nasal cannula -- No Pain    06/13/24 07:31:50 99.6 °F (37.6 °C) 74 23 148/65 93 98 % -- -- None (Room air) Lying --    06/12/24 2236 99.1 °F (37.3 °C) -- -- -- -- -- -- -- -- -- --    06/12/24 22:25:57 99.1 °F  (37.3 °C) 69 18 125/49 74 95 % 28 2 L/min Nasal cannula Lying No Pain    06/12/24 16:36:20 99.8 °F (37.7 °C) 68 22 133/57 82 98 % 28 2 L/min -- Lying --    06/12/24 1620 -- -- -- -- -- -- 28 2 L/min Nasal cannula -- --    06/12/24 12:33:25 -- 78 -- 152/66 95 98 % -- -- -- -- --    06/12/24 09:14:54 98.7 °F (37.1 °C) 74 20 134/61 85 97 % 28 2 L/min Nasal cannula Lying No Pain    06/12/24 09:14:41 98.7 °F (37.1 °C) 75 20 134/61 85 97 % -- -- -- -- --    06/12/24 0800 -- 76 20 139/65 93 96 % -- -- -- -- --    06/12/24 0730 -- 80 20 126/60 87 94 % -- -- -- -- --    06/12/24 0700 -- 82 20 117/59 84 94 % -- -- Nasal cannula -- --    06/12/24 0635 98.5 °F (36.9 °C) 89 20 134/60 85 88 % -- -- None (Room air) -- --          Weight (last 2 days)       None              Pertinent Labs/Diagnostic Results:   Radiology:  FL barium swallow video w speech   Final Interpretation by MADELAINE PAZ (06/12 1443)      XR chest portable   ED Interpretation by Emeli Horne MD (06/12 0704)   Abnormal   Multifocal pneumonia of the right lung fields.      Final Interpretation by Micky Zazueta MD (06/12 0844)      Large airspace opacity in the right lung consistent with pneumonia.      Findings concur with the preliminary report by the referring clinician already in PACS and/or our electronic record EPIC.         Workstation performed: [id number]           Cardiology:  ECG 12 lead   Final Result by Lorna Garcia MD (06/13 0017)   Sinus rhythm with 1st degree A-V block   Non-specific intra-ventricular conduction block   Abnormal ECG   When compared with ECG of 18-NOV-2023 19:04,   Sinus rhythm has replaced Wide QRS rhythm   Confirmed by Lorna Garcia ([id number]) on 6/13/2024 12:17:47 AM        GI:  No orders to display       Results from last 7 days   Lab Units 06/12/24  0647   SARS-COV-2  Negative     Results from last 7 days   Lab Units 06/14/24  0229 06/13/24  0248 06/12/24  0647   WBC Thousand/uL  "7.95 10.18* 18.00*   HEMOGLOBIN g/dL 11.4* 11.7* 13.9   HEMATOCRIT % 34.9* 36.5 42.2   PLATELETS Thousands/uL 140* 141* 174   TOTAL NEUT ABS Thousands/µL 5.69 7.98* 15.59*         Results from last 7 days   Lab Units 06/14/24  0229 06/13/24  0248 06/12/24  0647   SODIUM mmol/L 136 139 139   POTASSIUM mmol/L 3.5 3.1* 3.3*   CHLORIDE mmol/L 102 104 100   CO2 mmol/L 28 29 28   ANION GAP mmol/L 6 6 11   BUN mg/dL 10 10 14   CREATININE mg/dL 0.80 0.77 0.95   EGFR ml/min/1.73sq m 86 87 76   CALCIUM mg/dL 8.5 8.3* 9.4     Results from last 7 days   Lab Units 06/12/24  0647   AST U/L 29   ALT U/L 21   ALK PHOS U/L 114*   TOTAL PROTEIN g/dL 6.8   ALBUMIN g/dL 3.9   TOTAL BILIRUBIN mg/dL 0.70     Results from last 7 days   Lab Units 06/14/24  1046 06/14/24  0749 06/13/24  1626 06/13/24  1052 06/13/24  0729 06/12/24  1705   POC GLUCOSE mg/dl 172* 123 100 162* 100 109     Results from last 7 days   Lab Units 06/14/24  0229 06/13/24  0248 06/12/24  0647   GLUCOSE RANDOM mg/dL 109 100 175*         Results from last 7 days   Lab Units 06/12/24  1605   HEMOGLOBIN A1C % 5.8*   EAG mg/dl 120     No results found for: \"BETA-HYDROXYBUTYRATE\"                   Results from last 7 days   Lab Units 06/12/24  1030 06/12/24  1023 06/12/24  0647   HS TNI 0HR ng/L  --   --  28   HS TNI 2HR ng/L 24  --   --    HSTNI D2 ng/L -4  --   --    HS TNI 4HR ng/L  --  25  --    HSTNI D4 ng/L  --  -3  --          Results from last 7 days   Lab Units 06/12/24  0647   PROTIME seconds 14.6*   INR  1.09   PTT seconds 29         Results from last 7 days   Lab Units 06/13/24  0248 06/12/24  0647   PROCALCITONIN ng/ml 0.50* 0.40*     Results from last 7 days   Lab Units 06/12/24  0647   LACTIC ACID mmol/L 1.5             Results from last 7 days   Lab Units 06/12/24  0647   BNP pg/mL 562*           Results from last 7 days   Lab Units 06/12/24  1556 06/12/24  0647   STREP PNEUMONIAE ANTIGEN, URINE  Negative  --    LEGIONELLA URINARY ANTIGEN  Negative  --  "   INFLUENZA A PCR   --  Negative   INFLUENZA B PCR   --  Negative   RSV PCR   --  Negative       Results from last 7 days   Lab Units 06/12/24  0651 06/12/24  0647   BLOOD CULTURE  No Growth at 48 hrs. No Growth at 48 hrs.                   Medications:   Scheduled Medications:  ampicillin-sulbactam, 3 g, Intravenous, Q6H  aspirin, 81 mg, Oral, Daily  atorvastatin, 40 mg, Oral, Daily With Dinner  Empagliflozin, 20 mg, Oral, QAM  ferrous sulfate, 325 mg, Oral, Daily With Breakfast  furosemide, 20 mg, Oral, BID  guaiFENesin, 600 mg, Oral, BID  heparin (porcine), 5,000 Units, Subcutaneous, Q8H DONNIE  insulin lispro, 1-5 Units, Subcutaneous, TID AC  metoprolol succinate, 100 mg, Oral, Daily  multivitamin-minerals, 1 tablet, Oral, Daily  pantoprazole, 40 mg, Oral, Early Morning  valACYclovir, 500 mg, Oral, Daily      Continuous IV Infusions:     PRN Meds:       Discharge Plan: TBD    Network Utilization Review Department  ATTENTION: Please call with any questions or concerns to [phone] and carefully listen to the prompts so that you are directed to the right person. All voicemails are confidential.   For Discharge needs, contact Care Management DC Support Team at [phone] opt. [phone]  Send all requests for admission clinical reviews, approved or denied determinations and any other requests to dedicated fax number below belonging to the campus where the patient is receiving treatment. List of dedicated fax numbers for the Facilities:  FACILITY NAME UR FAX NUMBER   ADMISSION DENIALS (Administrative/Medical Necessity) [phone]   DISCHARGE SUPPORT TEAM (NETWORK) [phone]   [facility] (Maternity/NICU/Pediatrics) [phone]   [facility] [phone]   [facility] [phone]   [facility] [phone]   [facility] [phone]   [facility] [phone]   [facility] [phone]   General acute hospital [phone]   [facility] [phone]   [facility] [phone]   [facility] [phone]   [facility] [phone]   [facility] [phone]

## 2024-06-14 NOTE — ASSESSMENT & PLAN NOTE
Lab Results   Component Value Date    HGBA1C 5.8 (H) 06/12/2024       Recent Labs     06/14/24  1046 06/14/24  1620 06/14/24  2027 06/15/24  0741   POCGLU 172* 98 152* 110       Blood Sugar Average: Last 72 hrs:  (P) [id number]  On metformin, Jardiance at home  SSI  Diabetic diet

## 2024-06-14 NOTE — PROGRESS NOTES
[facility]  Progress Note  Name: Gabe Pickard I  MRN: [id number]  Unit/Bed#: -01 I Date of Admission: 6/12/2024   Date of Service: 6/14/2024 I Hospital Day: 2    Assessment & Plan   * Acute respiratory failure with hypoxia (HCC)  Assessment & Plan  Oxygen requirement at time of admission: 2l - Continue to wean as tolerated  Secondary to: Pneumonia  COVID/flu/RSV negative   mild elevation  Continue to treat underlying cause, see further management below  Wean as tolerated  Pt weaned to RA  resolved    Aspiration pneumonia of right upper lobe due to regurgitated food (HCC)  Assessment & Plan  Senting with shortness of breath, fever chills, cough  History of dysphagia  Leukocytosis, RR 20, requiring 2 L of oxygen  S/p cefepime and vancomycin in ED  urine strep, Legionella antigen negative  Sputum, blood cultures  Patient has allergies to ceftin- very bad hives, has tolerated zosyn before.   continue unasyn- D2  Speech eval  Last fever 100.9 on 6/13 @ 1434    Suspected aspiration pneumonia due to dysphagia a/e/b hypoxia, abnormal CXR, productive cough treated with speech VBS, IV Unasyn, aspiration precautions and sputum culture.     Dysphagia  Assessment & Plan  VBS ordered  Reviewed findings today  Had extensive discussion with patient this morning, he stated does not want to change the diet, no tube feeding.  Understands the risks for aspiration and possible compliactions with it. Agreeable to keep the code status still as level- 1    Continue dysphagia 2 thin    Will need outpt speech therapy on discharge    Chronic diastolic congestive heart failure (HCC)  Assessment & Plan  Wt Readings from Last 3 Encounters:   05/21/24 71.8 kg (158 lb 3.2 oz)   05/15/24 70.8 kg (156 lb)   02/28/24 72.3 kg (159 lb 6.4 oz)     Appears dry to euvolemic  Monitor weights, I's and O's  Hold Lasix today.  Patient appears dry  Lasix resumed          Multiple myeloma not having achieved  remission (HCC)  Assessment & Plan  Currently receiving chemo once a month, IVIG once a month  Continue follow-up with outpatient oncology    Type 2 diabetes mellitus without complication, without long-term current use of insulin (HCC)  Assessment & Plan  Lab Results   Component Value Date    HGBA1C 5.8 (H) 06/12/2024       Recent Labs     06/13/24  1626 06/14/24  0749 06/14/24  1046 06/14/24  1620   POCGLU 100 123 172* 98       Blood Sugar Average: Last 72 hrs:  (P) [id number]  On metformin, Jardiance at home  SSI  Diabetic diet               VTE Pharmacologic Prophylaxis:   Moderate Risk (Score 3-4) - Pharmacological DVT Prophylaxis Ordered: heparin.    Mobility:   Basic Mobility Inpatient Raw Score: 24  JH-HLM Goal: 8: Walk 250 feet or more  JH-HLM Achieved: 8: Walk 250 feet ot more  JH-HLM Goal achieved. Continue to encourage appropriate mobility.    Patient Centered Rounds: I performed bedside rounds with nursing staff today.   Discussions with Specialists or Other Care Team Provider: speech, CM, Pt, Ot    Education and Discussions with Family / Patient: Patient declined call to .     Total Time Spent on Date of Encounter in care of patient: 56 mins. This time was spent on one or more of the following: performing physical exam; counseling and coordination of care; obtaining or reviewing history; documenting in the medical record; reviewing/ordering tests, medications or procedures; communicating with other healthcare professionals and discussing with patient's family/caregivers.    Current Length of Stay: 2 day(s)  Current Patient Status: Inpatient   Certification Statement: The patient will continue to require additional inpatient hospital stay due to PNA  Discharge Plan: Anticipate discharge tomorrow to home.    Code Status: Level 1 - Full Code    Subjective:   Rafael was seen and examined at bedside.  No acute events overnight.  Discussed plan of care.  All questions and concerns were  answered and addressed.  Patient had a fever of 100.9 yesterday.  Will need to remain afebrile for 24 hours.  Patient just weaned to room air.  May need home O2 eval on discharge.  Continue modified diet.  Patient will need outpatient speech therapy when Case discussed with speech.    Objective:     Vitals:   Temp (24hrs), Av.1 °F (37.3 °C), Min:98.6 °F (37 °C), Max:99.7 °F (37.6 °C)    Temp:  [98.6 °F (37 °C)-99.7 °F (37.6 °C)] 98.7 °F (37.1 °C)  HR:  [65-74] 69  Resp:  [15-17] 15  BP: (129-154)/(51-66) 133/62  SpO2:  [91 %-99 %] 95 %  There is no height or weight on file to calculate BMI.     Input and Output Summary (last 24 hours):     Intake/Output Summary (Last 24 hours) at 2024 1722  Last data filed at 2024 1047  Gross per 24 hour   Intake 120 ml   Output 500 ml   Net -380 ml       Physical Exam:   Physical Exam  Vitals and nursing note reviewed.   Constitutional:       General: He is not in acute distress.     Appearance: He is ill-appearing (chronically).      Comments: Thin frail cachectic   HENT:      Head: Normocephalic and atraumatic.   Cardiovascular:      Rate and Rhythm: Normal rate and regular rhythm.      Pulses: Normal pulses.      Heart sounds: Normal heart sounds.   Pulmonary:      Effort: Pulmonary effort is normal.      Breath sounds: Rhonchi present.   Abdominal:      General: Abdomen is flat. Bowel sounds are normal.      Palpations: Abdomen is soft.   Musculoskeletal:      Right lower leg: No edema.      Left lower leg: No edema.   Skin:     General: Skin is warm.   Neurological:      General: No focal deficit present.      Mental Status: He is alert and oriented to person, place, and time.          Additional Data:     Labs:  Results from last 7 days   Lab Units 24  0229   WBC Thousand/uL 7.95   HEMOGLOBIN g/dL 11.4*   HEMATOCRIT % 34.9*   PLATELETS Thousands/uL 140*   SEGS PCT % 72   LYMPHO PCT % 15   MONO PCT % 11   EOS PCT % 1     Results from last 7 days   Lab Units  06/14/24  0229 06/13/24  0248 06/12/24  0647   SODIUM mmol/L 136   < > 139   POTASSIUM mmol/L 3.5   < > 3.3*   CHLORIDE mmol/L 102   < > 100   CO2 mmol/L 28   < > 28   BUN mg/dL 10   < > 14   CREATININE mg/dL 0.80   < > 0.95   ANION GAP mmol/L 6   < > 11   CALCIUM mg/dL 8.5   < > 9.4   ALBUMIN g/dL  --   --  3.9   TOTAL BILIRUBIN mg/dL  --   --  0.70   ALK PHOS U/L  --   --  114*   ALT U/L  --   --  21   AST U/L  --   --  29   GLUCOSE RANDOM mg/dL 109   < > 175*    < > = values in this interval not displayed.     Results from last 7 days   Lab Units 06/12/24  0647   INR  1.09     Results from last 7 days   Lab Units 06/14/24  1620 06/14/24  1046 06/14/24  0749 06/13/24  1626 06/13/24  1052 06/13/24  0729 06/12/24  1705   POC GLUCOSE mg/dl 98 172* 123 100 162* 100 109     Results from last 7 days   Lab Units 06/12/24  1605   HEMOGLOBIN A1C % 5.8*     Results from last 7 days   Lab Units 06/13/24  0248 06/12/24  0647   LACTIC ACID mmol/L  --  1.5   PROCALCITONIN ng/ml 0.50* 0.40*       Lines/Drains:  Invasive Devices       Peripheral Intravenous Line  Duration             Peripheral IV 06/12/24 Proximal;Right;Ventral (anterior) Forearm 2 days                          Imaging: Personally reviewed the following imaging: chest xray    Recent Cultures (last 7 days):   Results from last 7 days   Lab Units 06/12/24  1556 06/12/24  0651 06/12/24  0647   BLOOD CULTURE   --  No Growth at 48 hrs. No Growth at 48 hrs.   LEGIONELLA URINARY ANTIGEN  Negative  --   --        Last 24 Hours Medication List:   Current Facility-Administered Medications   Medication Dose Route Frequency Provider Last Rate    ampicillin-sulbactam  3 g Intravenous Q6H Paige Fofana MD 3 g (06/14/24 1613)    aspirin  81 mg Oral Daily Paige Fofana MD      atorvastatin  40 mg Oral Daily With Dinner Paige Fofana MD      Empagliflozin  20 mg Oral QAM Paige Fofana MD      ferrous sulfate  325 mg Oral Daily With Breakfast Paige Fofana MD       furosemide  20 mg Oral BID Paige Fofana MD      guaiFENesin  600 mg Oral BID Paige Fofana MD      heparin (porcine)  5,000 Units Subcutaneous Q8H DONNIE Paige Fofana MD      insulin lispro  1-5 Units Subcutaneous TID AC Paige Fofana MD      metoprolol succinate  100 mg Oral Daily Paige Fofana MD      multivitamin-minerals  1 tablet Oral Daily Paige Fofana MD      pantoprazole  40 mg Oral Early Morning Paige Fofana MD      valACYclovir  500 mg Oral Daily Paige Fofana MD          Today, Patient Was Seen By: Shona Messina MD    **Please Note: This note may have been constructed using a voice recognition system.**

## 2024-06-14 NOTE — PLAN OF CARE
Problem: PAIN - ADULT  Goal: Verbalizes/displays adequate comfort level or baseline comfort level  Description: Interventions:  - Encourage patient to monitor pain and request assistance  - Assess pain using appropriate pain scale  - Administer analgesics based on type and severity of pain and evaluate response  - Implement non-pharmacological measures as appropriate and evaluate response  - Consider cultural and social influences on pain and pain management  - Notify physician/advanced practitioner if interventions unsuccessful or patient reports new pain  Outcome: Progressing     Problem: INFECTION - ADULT  Goal: Absence or prevention of progression during hospitalization  Description: INTERVENTIONS:  - Assess and monitor for signs and symptoms of infection  - Monitor lab/diagnostic results  - Monitor all insertion sites, i.e. indwelling lines, tubes, and drains  - Monitor endotracheal if appropriate and nasal secretions for changes in amount and color  - [place] appropriate cooling/warming therapies per order  - Administer medications as ordered  - Instruct and encourage patient and family to use good hand hygiene technique  - Identify and instruct in appropriate isolation precautions for identified infection/condition  Outcome: Progressing  Goal: Absence of fever/infection during neutropenic period  Description: INTERVENTIONS:  - Monitor WBC    Outcome: Progressing     Problem: SAFETY ADULT  Goal: Patient will remain free of falls  Description: INTERVENTIONS:  - Educate patient/family on patient safety including physical limitations  - Instruct patient to call for assistance with activity   - Consult OT/PT to assist with strengthening/mobility   - Keep Call bell within reach  - Keep bed low and locked with side rails adjusted as appropriate  - Keep care items and personal belongings within reach  - Initiate and maintain comfort rounds  - Make Fall Risk Sign visible to staff  - Offer Toileting every 2 Hours,  in advance of need  - Initiate/Maintain bed alarm  - Obtain necessary fall risk management equipment: yellow socks  - Apply yellow socks and bracelet for high fall risk patients  - Consider moving patient to room near nurses station  Outcome: Progressing  Goal: Maintain or return to baseline ADL function  Description: INTERVENTIONS:  -  Assess patient's ability to carry out ADLs; assess patient's baseline for ADL function and identify physical deficits which impact ability to perform ADLs (bathing, care of mouth/teeth, toileting, grooming, dressing, etc.)  - Assess/evaluate cause of self-care deficits   - Assess range of motion  - Assess patient's mobility; develop plan if impaired  - Assess patient's need for assistive devices and provide as appropriate  - Encourage maximum independence but intervene and supervise when necessary  - Involve family in performance of ADLs  - Assess for home care needs following discharge   - Consider OT consult to assist with ADL evaluation and planning for discharge  - Provide patient education as appropriate  Outcome: Progressing  Goal: Maintains/Returns to pre admission functional level  Description: INTERVENTIONS:  - Perform AM-PAC 6 Click Basic Mobility/ Daily Activity assessment daily.  - Set and communicate daily mobility goal to care team and patient/family/caregiver.   - Collaborate with rehabilitation services on mobility goals if consulted  - Perform Range of Motion 3 times a day.  - Reposition patient every 2 hours.  - Dangle patient 3 times a day  - Stand patient 3 times a day  - Ambulate patient 3 times a day  - Out of bed to chair 3 times a day   - Out of bed for meals 3 times a day  - Out of bed for toileting  - Record patient progress and toleration of activity level   Outcome: Progressing     Problem: DISCHARGE PLANNING  Goal: Discharge to home or other facility with appropriate resources  Description: INTERVENTIONS:  - Identify barriers to discharge w/patient and  caregiver  - Arrange for needed discharge resources and transportation as appropriate  - Identify discharge learning needs (meds, wound care, etc.)  - Arrange for interpretive services to assist at discharge as needed  - Refer to Case Management Department for coordinating discharge planning if the patient needs post-hospital services based on physician/advanced practitioner order or complex needs related to functional status, cognitive ability, or social support system  Outcome: Progressing     Problem: Knowledge Deficit  Goal: Patient/family/caregiver demonstrates understanding of disease process, treatment plan, medications, and discharge instructions  Description: Complete learning assessment and assess knowledge base.  Interventions:  - Provide teaching at level of understanding  - Provide teaching via preferred learning methods  Outcome: Progressing

## 2024-06-14 NOTE — ASSESSMENT & PLAN NOTE
Senting with shortness of breath, fever chills, cough  History of dysphagia  Leukocytosis, RR 20, requiring 2 L of oxygen  S/p cefepime and vancomycin in ED  urine strep, Legionella antigen negative  Sputum, blood cultures  Patient has allergies to ceftin- very bad hives, has tolerated zosyn before.   continue unasyn- D2  Speech eval  Last fever 100.9 on 6/13 @ 1434    Suspected aspiration pneumonia due to dysphagia a/e/b hypoxia, abnormal CXR, productive cough treated with speech VBS, IV Unasyn, aspiration precautions and sputum culture.

## 2024-06-14 NOTE — ASSESSMENT & PLAN NOTE
Wt Readings from Last 3 Encounters:   05/21/24 71.8 kg (158 lb 3.2 oz)   05/15/24 70.8 kg (156 lb)   02/28/24 72.3 kg (159 lb 6.4 oz)     Appears dry to euvolemic  Monitor weights, I's and O's  Hold Lasix today.  Patient appears dry  Lasix resumed

## 2024-06-14 NOTE — ASSESSMENT & PLAN NOTE
Lab Results   Component Value Date    HGBA1C 5.8 (H) 06/12/2024       Recent Labs     06/13/24  1626 06/14/24  0749 06/14/24  1046 06/14/24  1620   POCGLU 100 123 172* 98       Blood Sugar Average: Last 72 hrs:  (P) 123.1871848857660196  On metformin, Jardiance at home  SSI  Diabetic diet

## 2024-06-14 NOTE — ASSESSMENT & PLAN NOTE
Oxygen requirement at time of admission: 2l - Continue to wean as tolerated  Secondary to: Pneumonia  COVID/flu/RSV negative   mild elevation  Continue to treat underlying cause, see further management below  Wean as tolerated  Pt weaned to RA  resolved

## 2024-06-14 NOTE — ASSESSMENT & PLAN NOTE
Senting with shortness of breath, fever chills, cough  History of dysphagia  Leukocytosis, RR 20, requiring 2 L of oxygen  S/p cefepime and vancomycin in ED  urine strep, Legionella antigen negative  Sputum, blood cultures  Patient has allergies to ceftin- very bad hives, has tolerated zosyn before.   continue unasyn- D3  Speech eval  Last fever 100.9 on 6/13 @ 1434    Suspected aspiration pneumonia due to dysphagia a/e/b hypoxia, abnormal CXR, productive cough treated with speech VBS, IV Unasyn, aspiration precautions and sputum culture.     F/u with PCP discharged on Augmentin 4 more days

## 2024-06-14 NOTE — UTILIZATION REVIEW
NOTIFICATION OF INPATIENT ADMISSION   AUTHORIZATION REQUEST   SERVICING FACILITY:   [place] [address]  Tax ID: [id number]  NPI: [id number] ATTENDING PROVIDER:  Attending Name and NPI#: Shona Messina Md [[id number]]  Address: [address] [place] [address]  Phone: [phone]   ADMISSION INFORMATION:  Place of Service: Inpatient [facility]  Place of Service Code: 21  Inpatient Admission Date/Time: 6/12/24  7:46 AM  Discharge Date/Time: No discharge date for patient encounter.  Admitting Diagnosis Code/Description:  Acute respiratory insufficiency [R06.89]  Fever [R50.9]  Multifocal pneumonia [J18.9]     UTILIZATION REVIEW CONTACT:  Meaghan Tristan Utilization   Network Utilization Review Department  Phone: [phone]  Fax: [phone]  Email: [email or web address]  Contact for approvals/pending authorizations, clinical reviews, and discharge.     PHYSICIAN ADVISORY SERVICES:  Medical Necessity Denial & Lmlt-sr-Djcq Review  Phone: [phone]  Fax: [phone]  Email: [email or web address]     DISCHARGE SUPPORT TEAM:  For Patients Discharge Needs & Updates  Phone: [phone] opt. 2 Fax: [phone]  Email: [email or web address]

## 2024-06-14 NOTE — SPEECH THERAPY NOTE
Speech Language/Pathology    Speech/Language Pathology Progress Note    Patient Name: Gabe Pickard  Today's Date: 6/14/2024     Problem List  Principal Problem:    Acute respiratory failure with hypoxia (HCC)  Active Problems:    Type 2 diabetes mellitus without complication, without long-term current use of insulin (HCC)    Multiple myeloma not having achieved remission (HCC)    Chronic diastolic congestive heart failure (HCC)    Pneumonia    Dysphagia       Past Medical History  Past Medical History:   Diagnosis Date    Acute on chronic systolic (congestive) heart failure (HCC) 05/15/2024    Acute respiratory failure with hypoxia (HCC) 04/20/2022    Arthritis     Cancer (HCC)     Cataract     Colitis     Colon polyp     Fatty liver     GERD (gastroesophageal reflux disease)     History of chemotherapy     History of radiation therapy     History of shingles 2018    History of transfusion     Hyperlipidemia     Sepsis without acute organ dysfunction (HCC) 02/04/2023    Shingles 2017        Past Surgical History  Past Surgical History:   Procedure Laterality Date    AORTIC VALVE REPLACEMENT      CARDIAC VALVE REPLACEMENT  2014    aorta    CATARACT EXTRACTION Bilateral     COLECTOMY      COLONOSCOPY  11/2019    Prior right hemicolectomy    CORONARY ARTERY BYPASS GRAFT      DENTAL SURGERY      JOINT REPLACEMENT  January 2019    left knee    KNEE SURGERY      LYMPH NODE BIOPSY  2003    LYMPHADENECTOMY      OTHER SURGICAL HISTORY      MAZE PROCEDURE    KS ARTHRP KNE CONDYLE&PLATU MEDIAL&LAT COMPARTMENTS Left 01/28/2019    Procedure: ARTHROPLASTY LEFT KNEE TOTAL;  Surgeon: Darell Delgado MD;  Location:  MAIN OR;  Service: Orthopedics    KS LARYNGOSCOPY DIRECT OPERATIVE W/BIOPSY Right 04/27/2022    Procedure: DIRECT LARYNGOSCOPY WITH BIOPSY RIGHT PHARYNX, POSSIBLE RIGHT NECK LYMPH NODE BIOPSY; FROZEN SECTION;  Surgeon: Kevin Hardin MD;  Location:  MAIN OR;  Service: ENT    SKIN BIOPSY      UPPER GASTROINTESTINAL  "ENDOSCOPY  11/2019    Schatzki ring    US GUIDED LYMPH NODE BIOPSY RIGHT  12/14/2021         Subjective:  Pt requesting materials for diet prep.  \"Thank you so much for your help\"    Current Diet:  [facility] soft w/ thin     Objective:  Pt seen for dx dysphagia tx f/u. Hand-out provided regarding preparation/recommendations. Reviewed instructions and answered relevant questions. Hand-out also provided for OP ST locations. Pt appreciative of hand-outs, denies further questions. Pt eating mechanical soft solids and thin liquids (breakfast tray. Effective mastication w/ prompt oral organization. Swallows suspected delayed w/ reduced rise to palpation. No overt s/s aspiration across meal. Pt endorses improved globus sensation w/ modified diet, agreeable to continue.     Assessment:  Pt w/ good understanding of MBS, oropharyngeal swallow skill, and diet prep/recommendation. No overt s/s oropharyngeal difficulties or aspiration w/ material today.    Plan/Recommendations:  Current diet appears safest/least restrictive at this time, may be able to advance w/ OP ST f/u  Frequent/thorough oral care  ST to f/u x1-2 weekly      "

## 2024-06-15 VITALS
DIASTOLIC BLOOD PRESSURE: 70 MMHG | HEIGHT: 70 IN | RESPIRATION RATE: 17 BRPM | SYSTOLIC BLOOD PRESSURE: 142 MMHG | TEMPERATURE: 98.4 F | BODY MASS INDEX: 22.62 KG/M2 | HEART RATE: 73 BPM | WEIGHT: 158 LBS | OXYGEN SATURATION: 94 %

## 2024-06-15 PROBLEM — J96.01 ACUTE RESPIRATORY FAILURE WITH HYPOXIA (HCC): Status: RESOLVED | Noted: 2022-04-20 | Resolved: 2024-06-15

## 2024-06-15 LAB
ANION GAP SERPL CALCULATED.3IONS-SCNC: 7 MMOL/L (ref 4–13)
BUN SERPL-MCNC: 11 MG/DL (ref 5–25)
CALCIUM SERPL-MCNC: 8.8 MG/DL (ref 8.4–10.2)
CHLORIDE SERPL-SCNC: 101 MMOL/L (ref 96–108)
CO2 SERPL-SCNC: 29 MMOL/L (ref 21–32)
CREAT SERPL-MCNC: 0.76 MG/DL (ref 0.6–1.3)
GFR SERPL CREATININE-BSD FRML MDRD: 88 ML/MIN/1.73SQ M
GLUCOSE SERPL-MCNC: 110 MG/DL (ref 65–140)
GLUCOSE SERPL-MCNC: 111 MG/DL (ref 65–140)
GLUCOSE SERPL-MCNC: 173 MG/DL (ref 65–140)
POTASSIUM SERPL-SCNC: 3.4 MMOL/L (ref 3.5–5.3)
SODIUM SERPL-SCNC: 137 MMOL/L (ref 135–147)

## 2024-06-15 PROCEDURE — 99239 HOSP IP/OBS DSCHRG MGMT >30: CPT | Performed by: STUDENT IN AN ORGANIZED HEALTH CARE EDUCATION/TRAINING PROGRAM

## 2024-06-15 PROCEDURE — 82948 REAGENT STRIP/BLOOD GLUCOSE: CPT

## 2024-06-15 PROCEDURE — 80048 BASIC METABOLIC PNL TOTAL CA: CPT | Performed by: STUDENT IN AN ORGANIZED HEALTH CARE EDUCATION/TRAINING PROGRAM

## 2024-06-15 RX ORDER — POTASSIUM CHLORIDE 20 MEQ/1
20 TABLET, EXTENDED RELEASE ORAL ONCE
Status: COMPLETED | OUTPATIENT
Start: 2024-06-15 | End: 2024-06-15

## 2024-06-15 RX ORDER — AMOXICILLIN AND CLAVULANATE POTASSIUM 875; 125 MG/1; MG/1
1 TABLET, FILM COATED ORAL EVERY 12 HOURS SCHEDULED
Qty: 8 TABLET | Refills: 0 | Status: SHIPPED | OUTPATIENT
Start: 2024-06-15 | End: 2024-06-19

## 2024-06-15 RX ORDER — GUAIFENESIN 600 MG/1
600 TABLET, EXTENDED RELEASE ORAL 2 TIMES DAILY
Qty: 30 TABLET | Refills: 0 | Status: SHIPPED | OUTPATIENT
Start: 2024-06-15 | End: 2024-06-30

## 2024-06-15 RX ADMIN — FERROUS SULFATE TAB 325 MG (65 MG ELEMENTAL FE) 325 MG: 325 (65 FE) TAB at 08:32

## 2024-06-15 RX ADMIN — PANTOPRAZOLE SODIUM 40 MG: 40 TABLET, DELAYED RELEASE ORAL at 05:12

## 2024-06-15 RX ADMIN — AMPICILLIN SODIUM AND SULBACTAM SODIUM 3 G: 2; 1 INJECTION, POWDER, FOR SOLUTION INTRAMUSCULAR; INTRAVENOUS at 05:12

## 2024-06-15 RX ADMIN — GUAIFENESIN 600 MG: 600 TABLET ORAL at 08:32

## 2024-06-15 RX ADMIN — POTASSIUM CHLORIDE 20 MEQ: 1500 TABLET, EXTENDED RELEASE ORAL at 08:33

## 2024-06-15 RX ADMIN — METOPROLOL SUCCINATE 100 MG: 50 TABLET, EXTENDED RELEASE ORAL at 08:32

## 2024-06-15 RX ADMIN — HEPARIN SODIUM 5000 UNITS: 5000 INJECTION, SOLUTION INTRAVENOUS; SUBCUTANEOUS at 05:12

## 2024-06-15 RX ADMIN — MULTIPLE VITAMINS W/ MINERALS TAB 1 TABLET: TAB ORAL at 08:32

## 2024-06-15 RX ADMIN — EMPAGLIFLOZIN 20 MG: 10 TABLET, FILM COATED ORAL at 08:32

## 2024-06-15 RX ADMIN — ASPIRIN 81 MG: 81 TABLET, COATED ORAL at 08:33

## 2024-06-15 RX ADMIN — VALACYCLOVIR HYDROCHLORIDE 500 MG: 500 TABLET, FILM COATED ORAL at 08:32

## 2024-06-15 RX ADMIN — FUROSEMIDE 20 MG: 20 TABLET ORAL at 08:32

## 2024-06-15 NOTE — PLAN OF CARE
Problem: PAIN - ADULT  Goal: Verbalizes/displays adequate comfort level or baseline comfort level  Description: Interventions:  - Encourage patient to monitor pain and request assistance  - Assess pain using appropriate pain scale  - Administer analgesics based on type and severity of pain and evaluate response  - Implement non-pharmacological measures as appropriate and evaluate response  - Consider cultural and social influences on pain and pain management  - Notify physician/advanced practitioner if interventions unsuccessful or patient reports new pain  Outcome: Progressing     Problem: INFECTION - ADULT  Goal: Absence or prevention of progression during hospitalization  Description: INTERVENTIONS:  - Assess and monitor for signs and symptoms of infection  - Monitor lab/diagnostic results  - Monitor all insertion sites, i.e. indwelling lines, tubes, and drains  - Monitor endotracheal if appropriate and nasal secretions for changes in amount and color  - [place] appropriate cooling/warming therapies per order  - Administer medications as ordered  - Instruct and encourage patient and family to use good hand hygiene technique  - Identify and instruct in appropriate isolation precautions for identified infection/condition  Outcome: Progressing  Goal: Absence of fever/infection during neutropenic period  Description: INTERVENTIONS:  - Monitor WBC    Outcome: Progressing     Problem: SAFETY ADULT  Goal: Patient will remain free of falls  Description: INTERVENTIONS:  - Educate patient/family on patient safety including physical limitations  - Instruct patient to call for assistance with activity   - Consult OT/PT to assist with strengthening/mobility   - Keep Call bell within reach  - Keep bed low and locked with side rails adjusted as appropriate  - Keep care items and personal belongings within reach  - Initiate and maintain comfort rounds  - Make Fall Risk Sign visible to staff  - Offer Toileting every 2 Hours,  in advance of need  - Initiate/Maintain bed alarm  - Obtain necessary fall risk management equipment: yellow socks  - Apply yellow socks and bracelet for high fall risk patients  - Consider moving patient to room near nurses station  Outcome: Progressing  Goal: Maintain or return to baseline ADL function  Description: INTERVENTIONS:  -  Assess patient's ability to carry out ADLs; assess patient's baseline for ADL function and identify physical deficits which impact ability to perform ADLs (bathing, care of mouth/teeth, toileting, grooming, dressing, etc.)  - Assess/evaluate cause of self-care deficits   - Assess range of motion  - Assess patient's mobility; develop plan if impaired  - Assess patient's need for assistive devices and provide as appropriate  - Encourage maximum independence but intervene and supervise when necessary  - Involve family in performance of ADLs  - Assess for home care needs following discharge   - Consider OT consult to assist with ADL evaluation and planning for discharge  - Provide patient education as appropriate  Outcome: Progressing  Goal: Maintains/Returns to pre admission functional level  Description: INTERVENTIONS:  - Perform AM-PAC 6 Click Basic Mobility/ Daily Activity assessment daily.  - Set and communicate daily mobility goal to care team and patient/family/caregiver.   - Collaborate with rehabilitation services on mobility goals if consulted  - Perform Range of Motion 3 times a day.  - Reposition patient every 2 hours.  - Dangle patient 3 times a day  - Stand patient 3 times a day  - Ambulate patient 3 times a day  - Out of bed to chair 3 times a day   - Out of bed for meals 3 times a day  - Out of bed for toileting  - Record patient progress and toleration of activity level   Outcome: Progressing     Problem: DISCHARGE PLANNING  Goal: Discharge to home or other facility with appropriate resources  Description: INTERVENTIONS:  - Identify barriers to discharge w/patient and  caregiver  - Arrange for needed discharge resources and transportation as appropriate  - Identify discharge learning needs (meds, wound care, etc.)  - Arrange for interpretive services to assist at discharge as needed  - Refer to Case Management Department for coordinating discharge planning if the patient needs post-hospital services based on physician/advanced practitioner order or complex needs related to functional status, cognitive ability, or social support system  Outcome: Progressing

## 2024-06-15 NOTE — PLAN OF CARE
Problem: PAIN - ADULT  Goal: Verbalizes/displays adequate comfort level or baseline comfort level  Description: Interventions:  - Encourage patient to monitor pain and request assistance  - Assess pain using appropriate pain scale  - Administer analgesics based on type and severity of pain and evaluate response  - Implement non-pharmacological measures as appropriate and evaluate response  - Consider cultural and social influences on pain and pain management  - Notify physician/advanced practitioner if interventions unsuccessful or patient reports new pain  Outcome: Progressing     Problem: INFECTION - ADULT  Goal: Absence or prevention of progression during hospitalization  Description: INTERVENTIONS:  - Assess and monitor for signs and symptoms of infection  - Monitor lab/diagnostic results  - Monitor all insertion sites, i.e. indwelling lines, tubes, and drains  - Monitor endotracheal if appropriate and nasal secretions for changes in amount and color  - [place] appropriate cooling/warming therapies per order  - Administer medications as ordered  - Instruct and encourage patient and family to use good hand hygiene technique  - Identify and instruct in appropriate isolation precautions for identified infection/condition  Outcome: Progressing  Goal: Absence of fever/infection during neutropenic period  Description: INTERVENTIONS:  - Monitor WBC    Outcome: Progressing

## 2024-06-15 NOTE — DISCHARGE INSTR - AVS FIRST PAGE
You are to follow-up with your PCP in 1 week    You will be discharged with Augmentin 875 mg to be taken twice a day for 4 more days    You are to continue a modified diet    You will have a referral for outpatient speech therapy

## 2024-06-15 NOTE — DISCHARGE SUMMARY
[facility]  Discharge- Gabe Pickard 1945, 78 y.o. male MRN: [id number]  Unit/Bed#: -01 Encounter: [id number]  Primary Care Provider: Radha Pettit DO   Date and time admitted to hospital: 6/12/2024  6:33 AM    * Acute respiratory failure with hypoxia (HCC)-resolved as of 6/15/2024  Assessment & Plan  Oxygen requirement at time of admission: 2l - Continue to wean as tolerated  Secondary to: Pneumonia  COVID/flu/RSV negative   mild elevation  Continue to treat underlying cause, see further management below  Wean as tolerated  Pt weaned to RA  resolved    Aspiration pneumonia of right upper lobe due to regurgitated food (HCC)  Assessment & Plan  Senting with shortness of breath, fever chills, cough  History of dysphagia  Leukocytosis, RR 20, requiring 2 L of oxygen  S/p cefepime and vancomycin in ED  urine strep, Legionella antigen negative  Sputum, blood cultures  Patient has allergies to ceftin- very bad hives, has tolerated zosyn before.   continue unasyn- D3  Speech eval  Last fever 100.9 on 6/13 @ 1434    Suspected aspiration pneumonia due to dysphagia a/e/b hypoxia, abnormal CXR, productive cough treated with speech VBS, IV Unasyn, aspiration precautions and sputum culture.     F/u with PCP discharged on Augmentin 4 more days    Dysphagia  Assessment & Plan  VBS ordered  Reviewed findings today  Had extensive discussion with patient this morning, he stated does not want to change the diet, no tube feeding.  Understands the risks for aspiration and possible compliactions with it. Agreeable to keep the code status still as level- 1    Continue dysphagia 2 thin    Will need outpt speech therapy on discharge    Chronic diastolic congestive heart failure (HCC)  Assessment & Plan  Wt Readings from Last 3 Encounters:   05/21/24 71.8 kg (158 lb 3.2 oz)   05/15/24 70.8 kg (156 lb)   02/28/24 72.3 kg (159 lb 6.4 oz)     Appears dry to euvolemic  Monitor weights, I's and  O's  Hold Lasix today.  Patient appears dry  Lasix resumed          Multiple myeloma not having achieved remission (HCC)  Assessment & Plan  Currently receiving chemo once a month, IVIG once a month  Continue follow-up with outpatient oncology    Type 2 diabetes mellitus without complication, without long-term current use of insulin (HCC)  Assessment & Plan  Lab Results   Component Value Date    HGBA1C 5.8 (H) 06/12/2024       Recent Labs     06/14/24  1046 06/14/24  1620 06/14/24  2027 06/15/24  0741   POCGLU 172* 98 152* 110       Blood Sugar Average: Last 72 hrs:  (P) [id number]  On metformin, Jardiance at home  SSI  Diabetic diet        Medical Problems       Resolved Problems  Date Reviewed: 5/21/2024            Resolved    * (Principal) Acute respiratory failure with hypoxia (HCC) 6/15/2024     Resolved by  Shona Messina MD        Discharging Physician / Practitioner: Shona Messina MD  PCP: Radha Pettit DO  Admission Date:   Admission Orders (From admission, onward)       Ordered        06/12/24 0745  INPATIENT ADMISSION  Once                          Discharge Date: 06/15/24    Consultations During Hospital Stay:  Case management  Speech therapy      Procedures Performed:   FL barium swallow video w speech   Final Result      XR chest portable   ED Interpretation   Abnormal   Multifocal pneumonia of the right lung fields.      Final Result      Large airspace opacity in the right lung consistent with pneumonia.      Findings concur with the preliminary report by the referring clinician already in PACS and/or our electronic record EPIC.         Workstation performed: [id number]               Significant Findings / Test Results:   See above    Incidental Findings:   none       Test Results Pending at Discharge (will require follow up):   none     Outpatient Tests Requested:  none    Complications:  none known at this time    Reason for Admission: Acute respiratory failure with  "hypoxia    Hospital Course:   Gabe Pickard is a 78 y.o. male patient who originally presented to the hospital on 6/12/2024 due to shortness of breath and low oxygen saturations.  Patient noted to have acute respiratory failure with hypoxia and pneumonia on admission not meeting SIRS criteria.  Patient with a history of dysphagia.  Speech was consulted and evaluated the patient and recommended dysphagia to thin liquids.  Patient continues to have an issue with dysphagia and declined any acute intervention and would like to take things orally despite having significant continued aspiration events due to this.  Patient was started on antibiotics and blood cultures were obtained as well as pneumonia workup.  Antibiotics were de-escalated to Unasyn.  Patient continued to improve and was weaned off of oxygen.  Blood cultures were negative urine antigens were negative.  He is otherwise remained hemodynamically stable afebrile in no acute respiratory distress.  Patient has been medically cleared for discharge.  He is to follow-up with his PCP in 1 week.  He will be discharged with 4 more days of Augmentin.  He is to continue a dysphagia to thin liquid diet and will receive a referral for outpatient speech therapy.    Hospital Course: No notes on file        Please see above list of diagnoses and related plan for additional information.     Condition at Discharge: stable    Discharge Day Visit / Exam:   Subjective:  Rafael was seen and examined at bedside.  No acute events overnight.  Discussed plan of care.  All questions and concerns were answered and addressed.  Has no acute complaints at this time.  Feels well.  Vitals: Blood Pressure: 142/70 (06/15/24 0737)  Pulse: 73 (06/15/24 0737)  Temperature: 98.4 °F (36.9 °C) (06/15/24 0737)  Temp Source: Oral (06/13/24 2145)  Respirations: 17 (06/15/24 0737)  Height: 5' 10\" (177.8 cm) (06/15/24 0900)  Weight - Scale: 71.7 kg (158 lb) (06/15/24 0900)  SpO2: 94 % (06/15/24 " 0737)  Exam:   Physical Exam   Vitals and nursing note reviewed.   Constitutional:       General: He is not in acute distress.     Appearance: He is ill-appearing (chronically).      Comments: Thin frail cachectic   HENT:      Head: Normocephalic and atraumatic.   Cardiovascular:      Rate and Rhythm: Normal rate and regular rhythm.      Pulses: Normal pulses.      Heart sounds: Normal heart sounds.   Pulmonary:      Effort: Pulmonary effort is normal.      Breath sounds: Rhonchi present.   Abdominal:      General: Abdomen is flat. Bowel sounds are normal.      Palpations: Abdomen is soft.   Musculoskeletal:      Right lower leg: No edema.      Left lower leg: No edema.   Skin:     General: Skin is warm.   Neurological:      General: No focal deficit present.      Mental Status: He is alert and oriented to person, place, and time.     Discussion with Family: Patient declined call to .     Discharge instructions/Information to patient and family:   See after visit summary for information provided to patient and family.      Provisions for Follow-Up Care:  See after visit summary for information related to follow-up care and any pertinent home health orders.      Mobility at time of Discharge:   Basic Mobility Inpatient Raw Score: 24  JH-HLM Goal: 8: Walk 250 feet or more  JH-HLM Achieved: 8: Walk 250 feet ot more  HLM Goal achieved. Continue to encourage appropriate mobility.     Disposition:   Home    Planned Readmission: no     Discharge Statement:  I spent 40 minutes discharging the patient. This time was spent on the day of discharge. I had direct contact with the patient on the day of discharge. Greater than 50% of the total time was spent examining patient, answering all patient questions, arranging and discussing plan of care with patient as well as directly providing post-discharge instructions.  Additional time then spent on discharge activities.    Discharge Medications:  See after visit  summary for reconciled discharge medications provided to patient and/or family.      **Please Note: This note may have been constructed using a voice recognition system**

## 2024-06-17 ENCOUNTER — TRANSITIONAL CARE MANAGEMENT (OUTPATIENT)
Dept: FAMILY MEDICINE CLINIC | Facility: HOSPITAL | Age: 79
End: 2024-06-17

## 2024-06-17 LAB
BACTERIA BLD CULT: NORMAL
BACTERIA BLD CULT: NORMAL

## 2024-06-17 NOTE — UTILIZATION REVIEW
NOTIFICATION OF ADMISSION DISCHARGE   This is a Notification of Discharge from [facility]. Please be advised that this patient has been discharge from our facility. Below you will find the admission and discharge date and time including the patient’s disposition.   UTILIZATION REVIEW CONTACT:  Meaghan Tristan  Utilization   Network Utilization Review Department  Phone: [phone] x carefully listen to the prompts. All voicemails are confidential.  Email: [email or web address]     ADMISSION INFORMATION  PRESENTATION DATE: 6/12/2024  6:33 AM  OBERVATION ADMISSION DATE:   INPATIENT ADMISSION DATE: 6/12/24  7:46 AM   DISCHARGE DATE: 6/15/2024 12:02 PM   DISPOSITION:Home/Self Care    Network Utilization Review Department  ATTENTION: Please call with any questions or concerns to [phone] and carefully listen to the prompts so that you are directed to the right person. All voicemails are confidential.   For Discharge needs, contact Care Management DC Support Team at [phone] opt. 2  Send all requests for admission clinical reviews, approved or denied determinations and any other requests to dedicated fax number below belonging to the campus where the patient is receiving treatment. List of dedicated fax numbers for the Facilities:  FACILITY NAME UR FAX NUMBER   ADMISSION DENIALS (Administrative/Medical Necessity) [phone]   DISCHARGE SUPPORT TEAM ([facility]) [phone]   PARENT CHILD HEALTH (Maternity/NICU/Pediatrics) [phone]   [facility] [phone]   [facility] [phone]   [facility] [phone]   [facility] [phone]   [facility] [phone]   [facility] [phone]   [facility] [phone]   [facility] [phone]    [facility] [phone]   [facility] [phone]   [facility] [phone]   [facility] [phone]        [facility]  Discharge- Gabe Pickard 1945, 78 y.o. male MRN: [id number]  Unit/Bed#: -01 Encounter: [id number]  Primary Care Provider: Radha Pettit DO   Date and time admitted to hospital: 6/12/2024  6:33 AM     * Acute respiratory failure with hypoxia (HCC)-resolved as of 6/15/2024  Assessment & Plan  Oxygen requirement at time of admission: 2l - Continue to wean as tolerated  Secondary to: Pneumonia  COVID/flu/RSV negative   mild elevation  Continue to treat underlying cause, see further management below  Wean as tolerated  Pt weaned to RA  resolved     Aspiration pneumonia of right upper lobe due to regurgitated food (HCC)  Assessment & Plan  Senting with shortness of breath, fever chills, cough  History of dysphagia  Leukocytosis, RR 20, requiring 2 L of oxygen  S/p cefepime and vancomycin in ED  urine strep, Legionella antigen negative  Sputum, blood cultures  Patient has allergies to ceftin- very bad hives, has tolerated zosyn before.   continue unasyn- D3  Speech eval  Last fever 100.9 on 6/13 @ 1434     Suspected aspiration pneumonia due to dysphagia a/e/b hypoxia, abnormal CXR, productive cough treated with speech VBS, IV Unasyn, aspiration precautions and sputum culture.      F/u with PCP discharged on Augmentin 4 more days     Dysphagia  Assessment & Plan  VBS ordered  Reviewed findings today  Had extensive discussion with patient this morning, he stated does not want to change the diet, no tube feeding.  Understands the risks for aspiration and possible compliactions with it. Agreeable to keep the code status still as level- 1     Continue dysphagia 2 thin     Will need outpt speech therapy on discharge     Chronic  diastolic congestive heart failure (HCC)  Assessment & Plan      Wt Readings from Last 3 Encounters:   05/21/24 71.8 kg (158 lb 3.2 oz)   05/15/24 70.8 kg (156 lb)   02/28/24 72.3 kg (159 lb 6.4 oz)      Appears dry to euvolemic  Monitor weights, I's and O's  Hold Lasix today.  Patient appears dry  Lasix resumed              Multiple myeloma not having achieved remission (HCC)  Assessment & Plan  Currently receiving chemo once a month, IVIG once a month  Continue follow-up with outpatient oncology     Type 2 diabetes mellitus without complication, without long-term current use of insulin (HCC)  Assessment & Plan        Lab Results   Component Value Date     HGBA1C 5.8 (H) 06/12/2024                Recent Labs     06/14/24  1046 06/14/24  1620 06/14/24  2027 06/15/24  0741   POCGLU 172* 98 152* 110         Blood Sugar Average: Last 72 hrs:  (P) [id number]  On metformin, Jardiance at home  SSI  Diabetic diet           Medical Problems         Resolved Problems  Date Reviewed: 5/21/2024              Resolved     * (Principal) Acute respiratory failure with hypoxia (HCC) 6/15/2024       Resolved by  Shona Messina MD         Discharging Physician / Practitioner: Shona Messina MD  PCP: Radha Pettit DO  Admission Date:   Admission Orders (From admission, onward)          Ordered         06/12/24 0745   INPATIENT ADMISSION  Once                               Discharge Date: 06/15/24     Consultations During Hospital Stay:  Case management  Speech therapy        Procedures Performed:   FL barium swallow video w speech   Final Result       XR chest portable   ED Interpretation   Abnormal   Multifocal pneumonia of the right lung fields.       Final Result       Large airspace opacity in the right lung consistent with pneumonia.       Findings concur with the preliminary report by the referring clinician already in PACS and/or our electronic record EPIC.           Workstation performed: [id number]                     Significant Findings / Test Results:   See above     Incidental Findings:   none         Test Results Pending at Discharge (will require follow up):   none     Outpatient Tests Requested:  none     Complications:  none known at this time     Reason for Admission: Acute respiratory failure with hypoxia     Hospital Course:   Gabe Pickard is a 78 y.o. male patient who originally presented to the hospital on 6/12/2024 due to shortness of breath and low oxygen saturations.  Patient noted to have acute respiratory failure with hypoxia and pneumonia on admission not meeting SIRS criteria.  Patient with a history of dysphagia.  Speech was consulted and evaluated the patient and recommended dysphagia to thin liquids.  Patient continues to have an issue with dysphagia and declined any acute intervention and would like to take things orally despite having significant continued aspiration events due to this.  Patient was started on antibiotics and blood cultures were obtained as well as pneumonia workup.  Antibiotics were de-escalated to Unasyn.  Patient continued to improve and was weaned off of oxygen.  Blood cultures were negative urine antigens were negative.  He is otherwise remained hemodynamically stable afebrile in no acute respiratory distress.  Patient has been medically cleared for discharge.  He is to follow-up with his PCP in 1 week.  He will be discharged with 4 more days of Augmentin.  He is to continue a dysphagia to thin liquid diet and will receive a referral for outpatient speech therapy.     Hospital Course: No notes on file           Please see above list of diagnoses and related plan for additional information.      Condition at Discharge: stable     Discharge Day Visit / Exam:   Subjective:  Rafael was seen and examined at bedside.  No acute events overnight.  Discussed plan of care.  All questions and concerns were answered and addressed.  Has no acute complaints at this time.  Feels  "well.  Vitals: Blood Pressure: 142/70 (06/15/24 0737)  Pulse: 73 (06/15/24 0737)  Temperature: 98.4 °F (36.9 °C) (06/15/24 0737)  Temp Source: Oral (06/13/24 2145)  Respirations: 17 (06/15/24 0737)  Height: 5' 10\" (177.8 cm) (06/15/24 0900)  Weight - Scale: 71.7 kg (158 lb) (06/15/24 0900)  SpO2: 94 % (06/15/24 0737)  Exam:   Physical Exam   Vitals and nursing note reviewed.   Constitutional:       General: He is not in acute distress.     Appearance: He is ill-appearing (chronically).      Comments: Thin frail cachectic   HENT:      Head: Normocephalic and atraumatic.   Cardiovascular:      Rate and Rhythm: Normal rate and regular rhythm.      Pulses: Normal pulses.      Heart sounds: Normal heart sounds.   Pulmonary:      Effort: Pulmonary effort is normal.      Breath sounds: Rhonchi present.   Abdominal:      General: Abdomen is flat. Bowel sounds are normal.      Palpations: Abdomen is soft.   Musculoskeletal:      Right lower leg: No edema.      Left lower leg: No edema.   Skin:     General: Skin is warm.   Neurological:      General: No focal deficit present.      Mental Status: He is alert and oriented to person, place, and time.      Discussion with Family: Patient declined call to .      Discharge instructions/Information to patient and family:   See after visit summary for information provided to patient and family.       Provisions for Follow-Up Care:  See after visit summary for information related to follow-up care and any pertinent home health orders.       Mobility at time of Discharge:   Basic Mobility Inpatient Raw Score: 24  JH-HLM Goal: 8: Walk 250 feet or more  JH-HLM Achieved: 8: Walk 250 feet ot more  HLM Goal achieved. Continue to encourage appropriate mobility.     Disposition:   Home     Planned Readmission: no     Discharge Statement:  I spent 40 minutes discharging the patient. This time was spent on the day of discharge. I had direct contact with the patient on the day of " discharge. Greater than 50% of the total time was spent examining patient, answering all patient questions, arranging and discussing plan of care with patient as well as directly providing post-discharge instructions.  Additional time then spent on discharge activities.     Discharge Medications:  See after visit summary for reconciled discharge medications provided to patient and/or family.       **Please Note: This note may have been constructed using a voice recognition system**

## 2024-06-19 ENCOUNTER — EVALUATION (OUTPATIENT)
Dept: SPEECH THERAPY | Facility: CLINIC | Age: 79
End: 2024-06-19
Payer: COMMERCIAL

## 2024-06-19 DIAGNOSIS — R13.10 DYSPHAGIA, UNSPECIFIED TYPE: Primary | ICD-10-CM

## 2024-06-19 DIAGNOSIS — C90.00 MULTIPLE MYELOMA, REMISSION STATUS UNSPECIFIED (HCC): ICD-10-CM

## 2024-06-19 PROCEDURE — 92610 EVALUATE SWALLOWING FUNCTION: CPT

## 2024-06-19 NOTE — PROGRESS NOTES
Speech-Language Pathology Initial Evaluation    Today's date: 2024   Patient’s name: Gabe Pickard  : 1945  MRN: [id number]  Safety measures:   Referring provider: Shona Messina MD    Encounter Diagnosis     ICD-10-CM    1. Dysphagia, unspecified type  R13.10 Ambulatory referral to Speech Therapy      2. Multiple myeloma, remission status unspecified (HCC)  C90.00             Assessment:  Patient presents with mild oral and severe pharyngeal dysphagia.  Patient opting for mechanical soft /thins and acceptance of high aspiration risk.  Patient motivated to work on swallowing exercises in efforts to improve swallow function and decrease risk of aspiration.  Recommend dysphagia therapy for 6-8 weeks and then reassess.      Completed structured activities with patient to target goals below.  Results as stated below.     Short Term    Patient will complete pharyngeal strengthening exercises for increased safety & improved swallow function.       Patient will complete daily oral motor exercises (IOPI as appropriate) to increase lingual/labial range of motion, strength, and coordination with min cues to 90% effectiveness to improve bolus formation and strengthen oral musculature.    Patient will perform compensatory strategies (e.g., upright positioning, small bites/sips, slow rate, alternation of consistencies, multiple swallows, effortful swallow, chin tuck, head turn, etc.) with 80% accuracy with minimized overt s/sx penetration/aspiration of least restrictive food/liquid consistencies.     Patient will tolerate least restrictive diet with minimized overt s/sx of penetration or aspiration.      Long Term    Patient will receive a videofluoroscopic swallow study (VFSS) to fully assess physiology and anatomy of the swallow and to determine the appropriate diet and/or rehabilitation exercises by discharge.     Patient will maintain adequate hydration and nutrition with optimum safety and efficacy of  swallowing function on P.O. intake without overt s/sx of penetration or aspiration by discharge.     Patient will utilize compensatory strategies with optimum safety and efficacy of swallowing function on P.O. intake without overt s/sx of penetration or aspiration by discharge.    Patient will develop safe and functional chewing and swallowing via use of dysphagia management strategies and diet modifications for adequate meal completion without significant s/sx of dysphagia and aspiration in order to maximize quality of life and to decrease potential for medical complications for dysphagia.       Plan:  Patient would benefit from outpatient skilled Speech Therapy services: Dysphagia therapy    Frequency: 2-3 x weekly  Duration: 6-8 weeks    Intervention certification from: 6/19/2024  Intervention certification to: 8/14/2024      Subjective:  History of present illness: Patient is a 78 y.o. male who was referred to outpatient skilled Speech Therapy services for a dysphagia evaluation. Patient with history of radiation and chemo for myeloma, completed Radiation in 1999.  Ongoing chemo treatment, once monthly with chemo.      6/12/24: VFSS: Pt w/ mild oral, severe pharyngeal dysphagia. Mildly prolonged mastication/oral organization. Fairly brisk transfers though mild diffuse residue. Severely delayed swallow initiation w/ material pooling in the pyriforms prior to initiation. Epiglottis is unable to invert 2/2 contact w/ PPW due to curvature of cervical spine/pharyngeal mass. Minimal hyo-laryngeal elevation w/ incomplete laryngeal vestibule closure resulting in deep penetration w/ subsequent aspiration of all liquid consistencies. Pt w/ inconsistent response to aspiration events (strong cough w/ thin, throat clear or silent w/ thickened liquids). Unfortunately, strategies not effective in improving airway protection at this time. Significant pharyngeal retention is noted w/ solids, not cleared w/ mult swallows despite  "max attempts.      Recommendations:  Diet: Deferred at this time, f/u w/ pt/spouse to discuss options and associated risks/benefits   Meds: Non-oral if possible   Strategies: -   Frequent oral care  Upright position  F/u ST tx: Yes   Therapy Prognosis:Poor   Prognosis considerations: Age, progressive disease process, severity of deficits   Full/Intermittent Supervision  Aspiration Precautions  Reflux Precautions  Consider consult with: ?GI for alt means of nutrition if within GOC, ?palliative care   Repeat MBS as necessary  If a dedicated assessment of the esophagus is desired, consider esophagram/barium swallow or EGD.          Patient's goal(s): \"To be able to eat normally.\"    Hearing: WFL  Vision: WFL    Home environment/lifestyle: lives with wife and her sister  Highest level of education: still works in [employer]  Vocational status: Graduated from [employer] then went to [employer].      Objective (testing):  HEAD & NECK CANCER DYSPHAGIA EVALUATION:    -Reason for referral: Signs/symptoms of dysphagia and History of aspiration pneumonia . Pneumonia x 3 in last 2 years, 8 x total.      -Subjective report of swallowing difficulty: Coughing, Choking, and Globus sensation     -Difficulty swallowing: Solids, Liquids, and Pills    Functional Outcome Measurements    Functional Oral Intake Scale (FOIS): 5 - total oral intake of multiple consistencies requiring special preparation    Eating Assessment Tool (EAT-10) score:  19/40    -Current diet (solids): Mechanical Soft  -Current diet (liquids): Thin  -Alternative Feeding Method?: No    -Facial appearance Symmetrical   -Dentition Full dentures   -Labial function WFL   -Lingual function WFL   -Velar function Symmetrical   -Trismus (i.e., limited jaw opening) Absent (measurement: )   -Lymphedema Absent   -Xerostomia  Absent (self-ratin)   -Neoplastic pain Absent    -Odynophagia (i.e., pain with swallowing) Absent (scale:)   -Mucositis  Absent   -Dysgeusia " (i.e., altered taste)  Present     Strength and Endurance Testing:  The IOPI Pro is used by medical professionals to measure, evaluate, and increase the strength and endurance of the tongue and lip in patients with oral motor disorders, including dysphagia and dysarthria.  The IOPI measures the maximum pressure (Pmax) a patient can produce in an air-filled bulb when it is compressed as hard as possible by the tongue or lip against a hard surface (e.g. The palate or teeth, respectively). Pmax is a measure of strength, expressed in kilopascals (kPa, an international unit of pressure).       For patients with dysphagia or dysarthria, oral motor fatigability may be of interest.  The IOPI Pro can be used to assess tongue fatigability.  Low endurance values are an indicator of a high fatigability.  Endurance is measured with the IOPI Pro by quantifying the length of time that a patient can maintain 50% of his or her Pmax.  This procedure is conducted in Target Mode by setting the target value to 50% of the patient's Pmax and timing how long the patient can hold the top (green) light on.       The medical professional determines what target value is appropriate for exercise therapy purposes and provides specific instructions to the patient for a particular exercise protocol.  In Target Mode, the pressure required to illuminate the green light a the top of the light array can be adjusted using the Set Target arrow buttons.  This green light is used as a visual target for the patient.     Tongue tip Peak Max after 3 trials: DNT Norm for gender is 63   Tongue back Peak Max after 3 trials: DNT Norm for gender is 5-10% lower than anterior tongue   Right lip Peak Max after 3 trials: DNT Norm for gender is 35   Left lip Peak Max after 3 trials: DNT Norm for gender is 35           LIQUID CONSISTENCY TESTIN. Liquid Consistency (Thin)  Administered by: Self-fed  Strategies, attempts, and responses: Multiple swallows       CLINICAL FINDINGS:  Oral phase impairments: WFL  Pharyngeal Phase Impairments: Swallow initiation Mild delay and Wet vocal quality    *Laryngeal excursion upon palpation: Poor HLE upon palpation      SOLID CONSISTENCY TESTIN. Solid Consistency (Regular and Puree)  Administered by: Self-fed  Strategies, attempts, and responses: Multiple swallows      CLINICAL FINDINGS:  Oral phase impairments: WFL - mildly increased time with dentures  Pharyngeal Phase Impairments: Swallow initiation Mild delay, Cough, and Complaints of globus sensation    *Laryngeal excursion upon palpation: Poor HLE upon palpation      SWALLOWING DIAGNOSTIC IMPRESSION:  -Swallowing diagnosis/severity: mild oral and severe pharyngeal     phase dysphagia    -Barriers to eating Po: altered taste    -Factors affecting performance: Endurance    -Safety concerns: Risk for aspiration, Risk for choking, and Risk for inadequate nutrition/ hydration    -Risk factors: History of Head and Neck Cancer    SAFETY PRECAUTIONS:  -Supervision: Independent     -Strategies: Small sips and bites when eating and Slow rate, swallow between bites    -Positioning: Upright position at least 30 minutes after meal and Upright position during meals    -Compensatory strategies: Super supraglottic swallow, Effortful swallow, and Multiple swallows    -Recommend (solids): Mechanical Soft    -Recommend (liquids): Thin    -Recommend (medications): crushed in puree as tolerated    REFERRALS: Consider discussion with MD regarding code status, etc. Since opting for aspiration risk and reoccurring pneumonia, etc.    Impressions: Patient presents with mild oral and severe pharyngeal phase dysphagia characterized by increased mastication time with dentition and + residuals and decreased airway protection, decreased hyolaryngeal elevation / excursion.      Due to known risks of developing dysphagia symptoms patient was provided with education on Speech Therapy intervention.  Educated patient on effortful exercises. Patient was also educated on possible symptoms they may experience after starting chemo/radiation including weakess in throat muscles, growth of fibrotic tissue, difficulty swallowing, pain with swallowing etc. Explained how being proactive with swallowing exercises may benefit in maintaining muscle strength and decrease atrophy. Reciprocal comprehension was verbally expressed.       Treatment:  Education provided.      Visit Tracking:  POC   Expires Auth Expiration Date ST Visit Limit   8/14/24 8/14/24 TBD          Visit/Unit Tracking:  Auth Status Date 6/19/24   TBD Used 1    Remaining TBD       Intervention Comments:  Pharyngeal exercises, NMES, Rashid, [id number]

## 2024-06-21 DIAGNOSIS — R07.9 CHEST PAIN: ICD-10-CM

## 2024-06-21 RX ORDER — FUROSEMIDE 20 MG/1
TABLET ORAL
Qty: 180 TABLET | Refills: 1 | Status: SHIPPED | OUTPATIENT
Start: 2024-06-21

## 2024-06-24 ENCOUNTER — OFFICE VISIT (OUTPATIENT)
Dept: SPEECH THERAPY | Facility: CLINIC | Age: 79
End: 2024-06-24
Payer: COMMERCIAL

## 2024-06-24 DIAGNOSIS — R13.10 DYSPHAGIA, UNSPECIFIED TYPE: Primary | ICD-10-CM

## 2024-06-24 DIAGNOSIS — C90.00 MULTIPLE MYELOMA, REMISSION STATUS UNSPECIFIED (HCC): ICD-10-CM

## 2024-06-24 PROCEDURE — 92526 ORAL FUNCTION THERAPY: CPT

## 2024-06-24 NOTE — PROGRESS NOTES
Daily Speech Treatment Note    Today's date: 2024   Patient’s name: Gabe Pickard  : 1945  MRN: [id number]  Safety measures:   Referring provider: Shona Messina MD      Encounter Diagnosis     ICD-10-CM    1. Dysphagia, unspecified type  R13.10       2. Multiple myeloma, remission status unspecified (HCC)  C90.00             Visit Trackin    Subjective/Behavioral:  - Pleasant/Cooperative      Objective/Assessment:  Completed structured activities with patient to target goals below.  Results as stated below.       Short-term goals:       Patient will complete pharyngeal strengthening exercises for increased safety & improved swallow function.   : Reviewed VFSS results and targeted interventions. Instructed in effortful swallow, mendelsohn, shaker, tongue pull backs, patient able to complete all independently. Advised to purchase EMST-150 as well.  Also, provided packet of stretches, etc.     Patient will complete daily oral motor exercises (IOPI as appropriate) to increase lingual/labial range of motion, strength, and coordination with min cues to 90% effectiveness to improve bolus formation and strengthen oral musculature.     Patient will perform compensatory strategies (e.g., upright positioning, small bites/sips, slow rate, alternation of consistencies, multiple swallows, effortful swallow, chin tuck, head turn, etc.) with 80% accuracy with minimized overt s/sx penetration/aspiration of least restrictive food/liquid consistencies.     Patient will tolerate least restrictive diet with minimized overt s/sx of penetration or aspiration.        Long Term     Patient will receive a videofluoroscopic swallow study (VFSS) to fully assess physiology and anatomy of the swallow and to determine the appropriate diet and/or rehabilitation exercises by discharge.      Patient will maintain adequate hydration and nutrition with optimum safety and efficacy of swallowing function on P.O. intake without  overt s/sx of penetration or aspiration by discharge.      Patient will utilize compensatory strategies with optimum safety and efficacy of swallowing function on P.O. intake without overt s/sx of penetration or aspiration by discharge.     Patient will develop safe and functional chewing and swallowing via use of dysphagia management strategies and diet modifications for adequate meal completion without significant s/sx of dysphagia and aspiration in order to maximize quality of life and to decrease potential for medical complications for dysphagia.           Plan:  -Continue with current plan of care.

## 2024-06-26 ENCOUNTER — OFFICE VISIT (OUTPATIENT)
Dept: FAMILY MEDICINE CLINIC | Facility: HOSPITAL | Age: 79
End: 2024-06-26
Payer: COMMERCIAL

## 2024-06-26 ENCOUNTER — OFFICE VISIT (OUTPATIENT)
Dept: SPEECH THERAPY | Facility: CLINIC | Age: 79
End: 2024-06-26
Payer: COMMERCIAL

## 2024-06-26 VITALS
DIASTOLIC BLOOD PRESSURE: 72 MMHG | BODY MASS INDEX: 22.25 KG/M2 | TEMPERATURE: 97.9 F | SYSTOLIC BLOOD PRESSURE: 132 MMHG | HEART RATE: 65 BPM | OXYGEN SATURATION: 95 % | WEIGHT: 155.4 LBS | HEIGHT: 70 IN

## 2024-06-26 DIAGNOSIS — C90.00 MULTIPLE MYELOMA, REMISSION STATUS UNSPECIFIED (HCC): ICD-10-CM

## 2024-06-26 DIAGNOSIS — R13.10 DYSPHAGIA, UNSPECIFIED TYPE: Primary | ICD-10-CM

## 2024-06-26 DIAGNOSIS — J69.0 ASPIRATION PNEUMONIA OF RIGHT UPPER LOBE DUE TO REGURGITATED FOOD (HCC): ICD-10-CM

## 2024-06-26 DIAGNOSIS — R13.13 PHARYNGEAL DYSPHAGIA: ICD-10-CM

## 2024-06-26 DIAGNOSIS — Z09 HOSPITAL DISCHARGE FOLLOW-UP: Primary | ICD-10-CM

## 2024-06-26 PROCEDURE — 99495 TRANSJ CARE MGMT MOD F2F 14D: CPT | Performed by: NURSE PRACTITIONER

## 2024-06-26 PROCEDURE — 92526 ORAL FUNCTION THERAPY: CPT

## 2024-06-26 RX ORDER — MELATONIN/PYRIDOXINE HCL (B6) 5 MG-10 MG
TABLET,IMMED, EXTENDED RELEASE, BIPHASIC ORAL
COMMUNITY

## 2024-06-26 NOTE — PROGRESS NOTES
Ambulatory Visit  Name: Gabe Pickard      : 1945      MRN: [id number]  Encounter Provider: DUNG Aguilar  Encounter Date: 2024   Encounter department: [facility] [address]     Assessment & Plan   1. Hospital discharge follow-up  2. Aspiration pneumonia of right upper lobe due to regurgitated food (HCC)  Assessment & Plan:  He has completed Augmentin.   Feeling well.   He is in speech therapy and following dysphagia to thin liquid diet.   I will discuss antibiotic to have on hand with DR. Pettit and will defer this to her.   He will call with any fever, chills, sob, cough.   3. Pharyngeal dysphagia  Assessment & Plan:  In speech therapy.   Following dysphagia to thin liquid diet.          History of Present Illness     Hospitalization summarized per copied d/c summary note below:    Hospital Course:   Gabe Pickard is a 78 y.o. male patient who originally presented to the hospital on 2024 due to shortness of breath and low oxygen saturations.  Patient noted to have acute respiratory failure with hypoxia and pneumonia on admission not meeting SIRS criteria.  Patient with a history of dysphagia.  Speech was consulted and evaluated the patient and recommended dysphagia to thin liquids.  Patient continues to have an issue with dysphagia and declined any acute intervention and would like to take things orally despite having significant continued aspiration events due to this.  Patient was started on antibiotics and blood cultures were obtained as well as pneumonia workup.  Antibiotics were de-escalated to Unasyn.  Patient continued to improve and was weaned off of oxygen.  Blood cultures were negative urine antigens were negative.  He is otherwise remained hemodynamically stable afebrile in no acute respiratory distress.  Patient has been medically cleared for discharge.  He is to follow-up with his PCP in 1 week.  He will be discharged with 4 more days of  Augmentin.  He is to continue a dysphagia to thin liquid diet and will receive a referral for outpatient speech therapy.    Seen by speech therapy today for pharyngeal strengthening. Not coughing after drinking or eating. Reports he does not recognize when he is aspirating. Following dysphagia to thin liquid diet.   Completed Augmentin. No fever, chills, cough or sob.    He states doctors in the hospital recommend he have antibiotic to have at home when symptomatic to nascimento off hospitalization.       Review of Systems   Constitutional:  Negative for chills, fatigue and fever.   HENT:  Negative for trouble swallowing.    Respiratory:  Negative for cough, shortness of breath and wheezing.      Past Medical History:   Diagnosis Date    Acute on chronic systolic (congestive) heart failure (HCC) 05/15/2024    Acute respiratory failure with hypoxia (HCC) 04/20/2022    Arthritis     Cancer (HCC)     Cataract     Colitis     Colon polyp     Fatty liver     GERD (gastroesophageal reflux disease)     History of chemotherapy     History of radiation therapy     History of shingles 2018    History of transfusion     Hyperlipidemia     Sepsis without acute organ dysfunction (HCC) 02/04/2023    Shingles 2017     Past Surgical History:   Procedure Laterality Date    AORTIC VALVE REPLACEMENT      CARDIAC VALVE REPLACEMENT  2014    aorta    CATARACT EXTRACTION Bilateral     COLECTOMY      COLONOSCOPY  11/2019    Prior right hemicolectomy    CORONARY ARTERY BYPASS GRAFT      DENTAL SURGERY      JOINT REPLACEMENT  January 2019    left knee    KNEE SURGERY      LYMPH NODE BIOPSY  2003    LYMPHADENECTOMY      OTHER SURGICAL HISTORY      MAZE PROCEDURE    DC ARTHRP KNE CONDYLE&PLATU MEDIAL&LAT COMPARTMENTS Left 01/28/2019    Procedure: ARTHROPLASTY LEFT KNEE TOTAL;  Surgeon: Darell Delgado MD;  Location:  MAIN OR;  Service: Orthopedics    DC LARYNGOSCOPY DIRECT OPERATIVE W/BIOPSY Right 04/27/2022    Procedure: DIRECT LARYNGOSCOPY WITH  "BIOPSY RIGHT PHARYNX, POSSIBLE RIGHT NECK LYMPH NODE BIOPSY; FROZEN SECTION;  Surgeon: Kevin Hardin MD;  Location:  MAIN OR;  Service: ENT    SKIN BIOPSY      UPPER GASTROINTESTINAL ENDOSCOPY  11/2019    Schatzki ring    US GUIDED LYMPH NODE BIOPSY RIGHT  12/14/2021     Family History   Problem Relation Age of Onset    Heart block Family     Coronary artery disease Mother     Thrombosis Mother     Hypertension Mother     Arthritis Mother     Hyperlipidemia Mother     Diabetes Father     Hypertension Father     Arthritis Father     Sudden death Father         cardiac    Diabetes type II Father     Colon cancer Paternal Grandfather     Cancer Paternal Grandfather     Breast cancer Sister     Heart disease Brother         Coronary stents     Social History     Tobacco Use    Smoking status: Former     Current packs/day: 0.00     Average packs/day: 1 pack/day for 42.0 years (42.0 ttl pk-yrs)     Types: Cigarettes     Start date: 1967     Quit date: 1/1/2009     Years since quitting: 15.4    Smokeless tobacco: Never   Vaping Use    Vaping status: Never Used   Substance and Sexual Activity    Alcohol use: Yes     Alcohol/week: 2.0 standard drinks of alcohol     Types: 2 Cans of beer per week     Comment: very rare \"beer here and there\"    Drug use: No    Sexual activity: Not Currently     Partners: Female     Birth control/protection: None     Current Outpatient Medications on File Prior to Visit   Medication Sig    aspirin 81 MG tablet Take 1 tablet (81 mg total) by mouth daily    atorvastatin (LIPITOR) 40 mg tablet TAKE 1 TABLET BY MOUTH EVERY DAY    cholecalciferol (VITAMIN D3) 1,000 units tablet Take 2 tablets (2,000 Units total) by mouth daily    Coenzyme Q10 (CoQ-10) 100 MG capsule     ferrous sulfate 324 (65 Fe) mg TAKE 1 TABLET BY MOUTH EVERY DAY WITH BREAKFAST    furosemide (LASIX) 20 mg tablet TAKE 1 TABLET BY MOUTH TWICE A DAY    glucose blood test strip     guaiFENesin (MUCINEX) 600 mg 12 hr tablet Take 1 " tablet (600 mg total) by mouth 2 (two) times a day for 15 days    IMMUNE GLOBULIN, HUMAN, IV Inject 30,000 mg into a catheter in a vein    Jardiance 25 MG TABS TAKE 1 TABLET BY MOUTH EVERY DAY IN THE MORNING    metFORMIN (GLUCOPHAGE-XR) 500 mg 24 hr tablet TAKE 3 TABLETS (1,500 MG TOTAL) BY MOUTH DAILY WITH BREAKFAST (Patient taking differently: 500 mg daily with breakfast)    metoprolol succinate (TOPROL-XL) 100 mg 24 hr tablet TAKE 1 TABLET BY MOUTH EVERY DAY    montelukast (SINGULAIR) 10 mg tablet TAKE 1 TABLET BY MOUTH EVERY DAY    multivitamin-minerals (CENTRUM ADULTS) tablet Take 1 tablet by mouth daily    omeprazole (PriLOSEC) 40 MG capsule TAKE 1 CAPSULE (40 MG TOTAL) BY MOUTH DAILY.    valACYclovir (VALTREX) 500 mg tablet Take 500 mg by mouth daily    [DISCONTINUED] fluorouracil (EFUDEX) 5 % cream Apply topically 2 (two) times a day     Allergies   Allergen Reactions    Ceftin [Cefuroxime] Itching     However, has tolerated Cefazolin and Pip-Tazo since, which have different side chains. Be cautious with / avoid 2nd, 3rd, or 4th Gen Cephs that have similar side chains to Cefuroxime.    Lantus [Insulin Glargine] Itching     Immunization History   Administered Date(s) Administered    COVID-19 PFIZER VACCINE 0.3 ML IM 03/08/2021, 03/29/2021, 08/17/2021, 08/27/2021, 04/01/2022    COVID-19 Pfizer Vac BIVALENT Crescencio-sucrose 12 Yr+ IM 09/12/2022, 04/17/2023    COVID-19 Pfizer mRNA vacc PF crescencio-sucrose 12 yr and older (Comirnaty) 10/02/2023    COVID-19 Pfizer vac (Crescencio-sucrose, gray cap) 12 yr+ IM 04/01/2022    INFLUENZA 09/18/2012, 09/01/2013, 09/01/2013, 10/01/2015, 10/01/2015, 11/28/2016, 11/03/2017, 10/25/2018, 09/12/2022, 10/02/2023    Influenza Split High Dose Preservative Free IM 09/18/2012, 10/07/2014, 09/30/2015, 11/28/2016, 11/03/2017    Influenza, high dose seasonal 0.7 mL 09/23/2019, 09/11/2020, 09/21/2021    Influenza, seasonal, injectable 11/01/2014    Pneumococcal 01/01/2013    Pneumococcal Conjugate  "13-Valent 02/15/2016, 02/10/2017    Pneumococcal Polysaccharide PPV23 10/01/2003, 09/18/2012    Respiratory Syncytial Virus Vaccine (Recombinant, Adjuvanted) 12/01/2023    Tdap 06/10/2014    Varicella 01/01/2014    Zoster 07/27/2014    Zoster Vaccine Recombinant 03/18/2022     Objective     /72 (BP Location: Left arm, Patient Position: Sitting, Cuff Size: Standard)   Pulse 65   Temp 97.9 °F (36.6 °C) (Tympanic)   Ht 5' 10\" (1.778 m)   Wt 70.5 kg (155 lb 6.4 oz)   SpO2 95%   BMI 22.30 kg/m²     Physical Exam  Vitals reviewed.   Constitutional:       General: He is not in acute distress.     Appearance: Normal appearance. He is not ill-appearing.   Cardiovascular:      Rate and Rhythm: Normal rate and regular rhythm.      Heart sounds: Murmur heard.   Pulmonary:      Effort: Pulmonary effort is normal.      Breath sounds: Normal breath sounds.   Neurological:      Mental Status: He is alert and oriented to person, place, and time.   Psychiatric:         Mood and Affect: Mood normal.         Behavior: Behavior normal.         Thought Content: Thought content normal.         Judgment: Judgment normal.       Administrative Statements         "

## 2024-06-26 NOTE — ASSESSMENT & PLAN NOTE
He has completed Augmentin.   Feeling well.   He is in speech therapy and following dysphagia to thin liquid diet.   I will discuss antibiotic to have on hand with DR. Pettit and will defer this to her.   He will call with any fever, chills, sob, cough.

## 2024-06-26 NOTE — PROGRESS NOTES
Daily Speech Treatment Note    Today's date: 2024   Patient’s name: Gabe Pickard  : 1945  MRN: [id number]  Safety measures:   Referring provider: Shona Messina MD      Encounter Diagnosis     ICD-10-CM    1. Dysphagia, unspecified type  R13.10       2. Multiple myeloma, remission status unspecified (HCC)  C90.00             Visit Tracking:  3    Subjective/Behavioral:  - Pleasant/Cooperative      Objective/Assessment:  Completed structured activities with patient to target goals below.  Results as stated below.       Short-term goals:       Patient will complete pharyngeal strengthening exercises for increased safety & improved swallow function.   : Reviewed VFSS results and targeted interventions. Instructed in effortful swallow, mendelsohn, shaker, tongue pull backs, patient able to complete all independently. Advised to purchase EMST-150 as well.  Also, provided packet of stretches, etc.     Patient will complete daily oral motor exercises (IOPI as appropriate) to increase lingual/labial range of motion, strength, and coordination with min cues to 90% effectiveness to improve bolus formation and strengthen oral musculature.      :    Strength and Endurance Testing:  The IOPI Pro is used by medical professionals to measure, evaluate, and increase the strength and endurance of the tongue and lip in patients with oral motor disorders, including dysphagia and dysarthria.  The IOPI measures the maximum pressure (Pmax) a patient can produce in an air-filled bulb when it is compressed as hard as possible by the tongue or lip against a hard surface (e.g. The palate or teeth, respectively). Pmax is a measure of strength, expressed in kilopascals (kPa, an international unit of pressure).       For patients with dysphagia or dysarthria, oral motor fatigability may be of interest.  The IOPI Pro can be used to assess tongue fatigability.  Low endurance values are an indicator of a high fatigability.   Endurance is measured with the IOPI Pro by quantifying the length of time that a patient can maintain 50% of his or her Pmax.  This procedure is conducted in Target Mode by setting the target value to 50% of the patient's Pmax and timing how long the patient can hold the top (green) light on.       The medical professional determines what target value is appropriate for exercise therapy purposes and provides specific instructions to the patient for a particular exercise protocol.  In Target Mode, the pressure required to illuminate the green light a the top of the light array can be adjusted using the Set Target arrow buttons.  This green light is used as a visual target for the patient.     Tongue tip Peak Max after 3 trials: 53 Norm for gender is 63   Tongue back Peak Max after 3 trials: 56 Norm for gender is 5-10% lower than anterior tongue   Right lip Peak Max after 3 trials: 23 Norm for gender is 35   Left lip Peak Max after 3 trials: 23 Norm for gender is 35      Effort: Max target: 26, helf for 43 seconds.      IOPI PEAK MEASUREMENT WEEKLY GRID      6/26/24           Tongue tip  (Goal = 56) 53           Tongue back  (Goal = 50) 56           Right Lip  (Goal = 35) 23        Left Lip  (Goal = 35) 23            IOPI -- Today's Exercise Targets    Tongue tip  (Goal = 56) Peak Max after 3 trials: 53 Effort level: 80% Target for treatment: 42   Tongue back  (Goal = 50) Peak Max after 3 trials: 56 Effort level: % Target for treatment:    Right Lip  (Goal = 35) Peak Max after 3 trials: 23 Effort level: 80% Target for treatment: 18   Left Lip  (Goal = 35) Peak Max after 3 trials: 23 Effort level: 80% Target for treatment: 18        6/26/24        Tongue tip  42 x 60        Tongue back         Right Lip 18 x 30         Left Lip 18 x 30              Patient will perform compensatory strategies (e.g., upright positioning, small bites/sips, slow rate, alternation of consistencies, multiple swallows, effortful swallow,  chin tuck, head turn, etc.) with 80% accuracy with minimized overt s/sx penetration/aspiration of least restrictive food/liquid consistencies.     Patient will tolerate least restrictive diet with minimized overt s/sx of penetration or aspiration.        Long Term     Patient will receive a videofluoroscopic swallow study (VFSS) to fully assess physiology and anatomy of the swallow and to determine the appropriate diet and/or rehabilitation exercises by discharge.      Patient will maintain adequate hydration and nutrition with optimum safety and efficacy of swallowing function on P.O. intake without overt s/sx of penetration or aspiration by discharge.      Patient will utilize compensatory strategies with optimum safety and efficacy of swallowing function on P.O. intake without overt s/sx of penetration or aspiration by discharge.     Patient will develop safe and functional chewing and swallowing via use of dysphagia management strategies and diet modifications for adequate meal completion without significant s/sx of dysphagia and aspiration in order to maximize quality of life and to decrease potential for medical complications for dysphagia.           Plan:  -Continue with current plan of care.

## 2024-07-01 ENCOUNTER — OFFICE VISIT (OUTPATIENT)
Dept: SPEECH THERAPY | Facility: CLINIC | Age: 79
End: 2024-07-01
Payer: COMMERCIAL

## 2024-07-01 DIAGNOSIS — R13.10 DYSPHAGIA, UNSPECIFIED TYPE: Primary | ICD-10-CM

## 2024-07-01 DIAGNOSIS — C90.00 MULTIPLE MYELOMA, REMISSION STATUS UNSPECIFIED (HCC): ICD-10-CM

## 2024-07-01 PROCEDURE — 92526 ORAL FUNCTION THERAPY: CPT

## 2024-07-01 NOTE — PROGRESS NOTES
"Daily Speech Treatment Note    Today's date: 2024   Patient’s name: Gabe Pickard  : 1945  MRN: [id number]  Safety measures:   Referring provider: Shona Messina MD      Encounter Diagnosis     ICD-10-CM    1. Dysphagia, unspecified type  R13.10       2. Multiple myeloma, remission status unspecified (HCC)  C90.00             Visit Trackin    Subjective/Behavioral:  - Pleasant/Cooperative      Objective/Assessment:  Completed structured activities with patient to target goals below.  Results as stated below.       Short-term goals:       Patient will complete pharyngeal strengthening exercises for increased safety & improved swallow function.   : Reviewed VFSS results and targeted interventions. Instructed in effortful swallow, mendelsohn, shaker, tongue pull backs, patient able to complete all independently. Advised to purchase EMST-150 as well.  Also, provided packet of stretches, etc. : Provided patient educational video to demonstrate how to utilize the EMST 75 device and the purpose of it. Clinician demonstrated how to put together the device and established a baseline for patient. Provided education to clean device with distilled water and soap.  Baseline: 70 cmH20. Provided education to patient to create adequate labial seal and utilized \"blowing out candles\" as the visual cue. Provided education to think 5. Completed 5 sets of 5 reps with 15 second rest in between each rep and 1 min rest between each set. Provided patient a handout to keep track of the data. Reciprocal agreement was noted.         Patient will complete daily oral motor exercises (IOPI as appropriate) to increase lingual/labial range of motion, strength, and coordination with min cues to 90% effectiveness to improve bolus formation and strengthen oral musculature.      :    Strength and Endurance Testing:  The IOPI Pro is used by medical professionals to measure, evaluate, and increase the strength and " endurance of the tongue and lip in patients with oral motor disorders, including dysphagia and dysarthria.  The IOPI measures the maximum pressure (Pmax) a patient can produce in an air-filled bulb when it is compressed as hard as possible by the tongue or lip against a hard surface (e.g. The palate or teeth, respectively). Pmax is a measure of strength, expressed in kilopascals (kPa, an international unit of pressure).       For patients with dysphagia or dysarthria, oral motor fatigability may be of interest.  The IOPI Pro can be used to assess tongue fatigability.  Low endurance values are an indicator of a high fatigability.  Endurance is measured with the IOPI Pro by quantifying the length of time that a patient can maintain 50% of his or her Pmax.  This procedure is conducted in Target Mode by setting the target value to 50% of the patient's Pmax and timing how long the patient can hold the top (green) light on.       The medical professional determines what target value is appropriate for exercise therapy purposes and provides specific instructions to the patient for a particular exercise protocol.  In Target Mode, the pressure required to illuminate the green light a the top of the light array can be adjusted using the Set Target arrow buttons.  This green light is used as a visual target for the patient.     Tongue tip Peak Max after 3 trials: 53 Norm for gender is 63   Tongue back Peak Max after 3 trials: 56 Norm for gender is 5-10% lower than anterior tongue   Right lip Peak Max after 3 trials: 23 Norm for gender is 35   Left lip Peak Max after 3 trials: 23 Norm for gender is 35      Effort: Max target: 26, helf for 43 seconds.      IOPI PEAK MEASUREMENT WEEKLY GRID      6/26/24           Tongue tip  (Goal = 56) 53           Tongue back  (Goal = 50) 56           Right Lip  (Goal = 35) 23        Left Lip  (Goal = 35) 23            IOPI -- Today's Exercise Targets    Tongue tip  (Goal = 56) Peak Max after 3  trials: 53 Effort level: 80% Target for treatment: 42   Tongue back  (Goal = 50) Peak Max after 3 trials: 56 Effort level: % Target for treatment:    Right Lip  (Goal = 35) Peak Max after 3 trials: 23 Effort level: 80% Target for treatment: 18   Left Lip  (Goal = 35) Peak Max after 3 trials: 23 Effort level: 80% Target for treatment: 18        6/26/24        Tongue tip  42 x 60        Tongue back         Right Lip 18 x 30         Left Lip 18 x 30              Patient will perform compensatory strategies (e.g., upright positioning, small bites/sips, slow rate, alternation of consistencies, multiple swallows, effortful swallow, chin tuck, head turn, etc.) with 80% accuracy with minimized overt s/sx penetration/aspiration of least restrictive food/liquid consistencies.     Patient will tolerate least restrictive diet with minimized overt s/sx of penetration or aspiration.        Long Term     Patient will receive a videofluoroscopic swallow study (VFSS) to fully assess physiology and anatomy of the swallow and to determine the appropriate diet and/or rehabilitation exercises by discharge.      Patient will maintain adequate hydration and nutrition with optimum safety and efficacy of swallowing function on P.O. intake without overt s/sx of penetration or aspiration by discharge.      Patient will utilize compensatory strategies with optimum safety and efficacy of swallowing function on P.O. intake without overt s/sx of penetration or aspiration by discharge.     Patient will develop safe and functional chewing and swallowing via use of dysphagia management strategies and diet modifications for adequate meal completion without significant s/sx of dysphagia and aspiration in order to maximize quality of life and to decrease potential for medical complications for dysphagia.           Plan:  -Continue with current plan of care.

## 2024-07-03 ENCOUNTER — OFFICE VISIT (OUTPATIENT)
Dept: SPEECH THERAPY | Facility: CLINIC | Age: 79
End: 2024-07-03
Payer: COMMERCIAL

## 2024-07-03 DIAGNOSIS — R13.10 DYSPHAGIA, UNSPECIFIED TYPE: Primary | ICD-10-CM

## 2024-07-03 DIAGNOSIS — C90.00 MULTIPLE MYELOMA, REMISSION STATUS UNSPECIFIED (HCC): ICD-10-CM

## 2024-07-03 PROCEDURE — 92526 ORAL FUNCTION THERAPY: CPT

## 2024-07-03 NOTE — PROGRESS NOTES
"Daily Speech Treatment Note    Today's date: 7/3/2024   Patient’s name: Gabe Pickard  : 1945  MRN: [id number]  Safety measures:   Referring provider: Shona Messina MD      Encounter Diagnosis     ICD-10-CM    1. Dysphagia, unspecified type  R13.10       2. Multiple myeloma, remission status unspecified (HCC)  C90.00             Visit Trackin    Subjective/Behavioral:  - Pleasant/Cooperative      Objective/Assessment:  Completed structured activities with patient to target goals below.  Results as stated below.       Short-term goals:       Patient will complete pharyngeal strengthening exercises for increased safety & improved swallow function.   : Reviewed VFSS results and targeted interventions. Instructed in effortful swallow, mendelsohn, shaker, tongue pull backs, patient able to complete all independently. Advised to purchase EMST-150 as well.  Also, provided packet of stretches, etc. : Provided patient educational video to demonstrate how to utilize the EMST 75 device and the purpose of it. Clinician demonstrated how to put together the device and established a baseline for patient. Provided education to clean device with distilled water and soap.  Baseline: 70 cmH20. Provided education to patient to create adequate labial seal and utilized \"blowing out candles\" as the visual cue. Provided education to think 5. Completed 5 sets of 5 reps with 15 second rest in between each rep and 1 min rest between each set. Provided patient a handout to keep track of the data. Reciprocal agreement was noted.         Patient will complete daily oral motor exercises (IOPI as appropriate) to increase lingual/labial range of motion, strength, and coordination with min cues to 90% effectiveness to improve bolus formation and strengthen oral musculature.      :    Strength and Endurance Testing:  The IOPI Pro is used by medical professionals to measure, evaluate, and increase the strength and " endurance of the tongue and lip in patients with oral motor disorders, including dysphagia and dysarthria.  The IOPI measures the maximum pressure (Pmax) a patient can produce in an air-filled bulb when it is compressed as hard as possible by the tongue or lip against a hard surface (e.g. The palate or teeth, respectively). Pmax is a measure of strength, expressed in kilopascals (kPa, an international unit of pressure).       For patients with dysphagia or dysarthria, oral motor fatigability may be of interest.  The IOPI Pro can be used to assess tongue fatigability.  Low endurance values are an indicator of a high fatigability.  Endurance is measured with the IOPI Pro by quantifying the length of time that a patient can maintain 50% of his or her Pmax.  This procedure is conducted in Target Mode by setting the target value to 50% of the patient's Pmax and timing how long the patient can hold the top (green) light on.       The medical professional determines what target value is appropriate for exercise therapy purposes and provides specific instructions to the patient for a particular exercise protocol.  In Target Mode, the pressure required to illuminate the green light a the top of the light array can be adjusted using the Set Target arrow buttons.  This green light is used as a visual target for the patient.     Tongue tip Peak Max after 3 trials: 53 Norm for gender is 63   Tongue back Peak Max after 3 trials: 56 Norm for gender is 5-10% lower than anterior tongue   Right lip Peak Max after 3 trials: 23 Norm for gender is 35   Left lip Peak Max after 3 trials: 23 Norm for gender is 35      Effort: Max target: 26, helf for 43 seconds.      IOPI PEAK MEASUREMENT WEEKLY GRID      6/26/24           Tongue tip  (Goal = 56) 53           Tongue back  (Goal = 50) 56           Right Lip  (Goal = 35) 23        Left Lip  (Goal = 35) 23            IOPI -- Today's Exercise Targets    Tongue tip  (Goal = 56) Peak Max after 3  trials: 53 Effort level: 80% Target for treatment: 42   Tongue back  (Goal = 50) Peak Max after 3 trials: 56 Effort level: % Target for treatment:    Right Lip  (Goal = 35) Peak Max after 3 trials: 23 Effort level: 80% Target for treatment: 18   Left Lip  (Goal = 35) Peak Max after 3 trials: 23 Effort level: 80% Target for treatment: 18        6/26/24        Tongue tip  42 x 60        Tongue back         Right Lip 18 x 30         Left Lip 18 x 30              Patient will perform compensatory strategies (e.g., upright positioning, small bites/sips, slow rate, alternation of consistencies, multiple swallows, effortful swallow, chin tuck, head turn, etc.) with 80% accuracy with minimized overt s/sx penetration/aspiration of least restrictive food/liquid consistencies.  7/3: Understanding and implementing compensatory strategies independently during session and at home.  Completing effortful swallows at meals.      Patient will tolerate least restrictive diet with minimized overt s/sx of penetration or aspiration.  7/3: Rare cough with thin liquids; generally tolerating regular/thin liquids using strategies, no change in vocal quality.        Long Term     Patient will receive a videofluoroscopic swallow study (VFSS) to fully assess physiology and anatomy of the swallow and to determine the appropriate diet and/or rehabilitation exercises by discharge.      Patient will maintain adequate hydration and nutrition with optimum safety and efficacy of swallowing function on P.O. intake without overt s/sx of penetration or aspiration by discharge.      Patient will utilize compensatory strategies with optimum safety and efficacy of swallowing function on P.O. intake without overt s/sx of penetration or aspiration by discharge.     Patient will develop safe and functional chewing and swallowing via use of dysphagia management strategies and diet modifications for adequate meal completion without significant s/sx of dysphagia  and aspiration in order to maximize quality of life and to decrease potential for medical complications for dysphagia.           Plan:  -Continue with current plan of care.

## 2024-07-08 ENCOUNTER — TELEPHONE (OUTPATIENT)
Age: 79
End: 2024-07-08

## 2024-07-08 ENCOUNTER — OFFICE VISIT (OUTPATIENT)
Dept: SPEECH THERAPY | Facility: CLINIC | Age: 79
End: 2024-07-08
Payer: COMMERCIAL

## 2024-07-08 DIAGNOSIS — R13.10 DYSPHAGIA, UNSPECIFIED TYPE: Primary | ICD-10-CM

## 2024-07-08 DIAGNOSIS — C90.00 MULTIPLE MYELOMA, REMISSION STATUS UNSPECIFIED (HCC): ICD-10-CM

## 2024-07-08 PROCEDURE — 92526 ORAL FUNCTION THERAPY: CPT

## 2024-07-08 NOTE — TELEPHONE ENCOUNTER
I did discuss this with Dr. Pettit and I am not aware what she wants to do with this. This will have to wait until she returns.

## 2024-07-08 NOTE — TELEPHONE ENCOUNTER
Patient had called in, stating that he was in the hospital on June 12, and had saw Sindy Porter on June 26th.     Patient had stated when he saw Sindy porter, she was going to touch base with Dr. Pettit, in regards to prescribing an antibiotic for the patient to have on hand, when he gets pneumonia, as he is prone too it.     Please advise back to patient in regards to this information, for additional questions, or concerns, or a medication could be called in.

## 2024-07-08 NOTE — PROGRESS NOTES
"Daily Speech Treatment Note    Today's date: 2024   Patient’s name: Gabe Pickard  : 1945  MRN: [id number]  Safety measures:   Referring provider: Shona Messina MD      Encounter Diagnosis     ICD-10-CM    1. Dysphagia, unspecified type  R13.10       2. Multiple myeloma, remission status unspecified (HCC)  C90.00             Visit Trackin    Subjective/Behavioral:  - Pleasant/Cooperative      Objective/Assessment:  Completed structured activities with patient to target goals below.  Results as stated below.       Short-term goals:       Patient will complete pharyngeal strengthening exercises for increased safety & improved swallow function.   : Reviewed VFSS results and targeted interventions. Instructed in effortful swallow, mendelsohn, shaker, tongue pull backs, patient able to complete all independently. Advised to purchase EMST-150 as well.  Also, provided packet of stretches, etc. : Provided patient educational video to demonstrate how to utilize the EMST 75 device and the purpose of it. Clinician demonstrated how to put together the device and established a baseline for patient. Provided education to clean device with distilled water and soap.  Baseline: 70 cmH20. Provided education to patient to create adequate labial seal and utilized \"blowing out candles\" as the visual cue. Provided education to think 5. Completed 5 sets of 5 reps with 15 second rest in between each rep and 1 min rest between each set. Provided patient a handout to keep track of the data. Reciprocal agreement was noted.    : Patient completed effortful swallows x 40 with regular snack and thin liquids, no vocal changes noted. ACHIEVED.     Patient will complete daily oral motor exercises (IOPI as appropriate) to increase lingual/labial range of motion, strength, and coordination with min cues to 90% effectiveness to improve bolus formation and strengthen oral musculature. ACHIEVED      :    Strength and " Endurance Testing:  The IOPI Pro is used by medical professionals to measure, evaluate, and increase the strength and endurance of the tongue and lip in patients with oral motor disorders, including dysphagia and dysarthria.  The IOPI measures the maximum pressure (Pmax) a patient can produce in an air-filled bulb when it is compressed as hard as possible by the tongue or lip against a hard surface (e.g. The palate or teeth, respectively). Pmax is a measure of strength, expressed in kilopascals (kPa, an international unit of pressure).       For patients with dysphagia or dysarthria, oral motor fatigability may be of interest.  The IOPI Pro can be used to assess tongue fatigability.  Low endurance values are an indicator of a high fatigability.  Endurance is measured with the IOPI Pro by quantifying the length of time that a patient can maintain 50% of his or her Pmax.  This procedure is conducted in Target Mode by setting the target value to 50% of the patient's Pmax and timing how long the patient can hold the top (green) light on.       The medical professional determines what target value is appropriate for exercise therapy purposes and provides specific instructions to the patient for a particular exercise protocol.  In Target Mode, the pressure required to illuminate the green light a the top of the light array can be adjusted using the Set Target arrow buttons.  This green light is used as a visual target for the patient.     Tongue tip Peak Max after 3 trials: 53 Norm for gender is 63   Tongue back Peak Max after 3 trials: 56 Norm for gender is 5-10% lower than anterior tongue   Right lip Peak Max after 3 trials: 23 Norm for gender is 35   Left lip Peak Max after 3 trials: 23 Norm for gender is 35      Effort: Max target: 26, helf for 43 seconds.      IOPI PEAK MEASUREMENT WEEKLY GRID      6/26/24           Tongue tip  (Goal = 56) 53           Tongue back  (Goal = 50) 56           Right Lip  (Goal = 35) 23         Left Lip  (Goal = 35) 23            IOPI -- Today's Exercise Targets    Tongue tip  (Goal = 56) Peak Max after 3 trials: 53 Effort level: 80% Target for treatment: 42   Tongue back  (Goal = 50) Peak Max after 3 trials: 56 Effort level: % Target for treatment:    Right Lip  (Goal = 35) Peak Max after 3 trials: 23 Effort level: 80% Target for treatment: 18   Left Lip  (Goal = 35) Peak Max after 3 trials: 23 Effort level: 80% Target for treatment: 18        6/26/24        Tongue tip  42 x 60        Tongue back         Right Lip 18 x 30         Left Lip 18 x 30              Patient will perform compensatory strategies (e.g., upright positioning, small bites/sips, slow rate, alternation of consistencies, multiple swallows, effortful swallow, chin tuck, head turn, etc.) with 80% accuracy with minimized overt s/sx penetration/aspiration of least restrictive food/liquid consistencies.  7/3: Understanding and implementing compensatory strategies independently during session and at home.  Completing effortful swallows at meals. 7/8: Implementing independently during session with regular/thins.  ACHIEVED     Patient will tolerate least restrictive diet with minimized overt s/sx of penetration or aspiration.  7/3: Rare cough with thin liquids; generally tolerating regular/thin liquids using strategies, no change in vocal quality.  7/8: No overt signs or symptoms of aspiration noted. Patient notes his coughing has greatly improved compared to prior to VFSS.  ACHIEVED         Long Term     Patient will receive a videofluoroscopic swallow study (VFSS) to fully assess physiology and anatomy of the swallow and to determine the appropriate diet and/or rehabilitation exercises by discharge. WILL SCHEDULE     Patient will maintain adequate hydration and nutrition with optimum safety and efficacy of swallowing function on P.O. intake without overt s/sx of penetration or aspiration by discharge. ACHIEVED     Patient will utilize  compensatory strategies with optimum safety and efficacy of swallowing function on P.O. intake without overt s/sx of penetration or aspiration by discharge. ACHIEVED     Patient will develop safe and functional chewing and swallowing via use of dysphagia management strategies and diet modifications for adequate meal completion without significant s/sx of dysphagia and aspiration in order to maximize quality of life and to decrease potential for medical complications for dysphagia. ACHIEVED          Plan:  Schedule VFSS for August. Will follow up with patient.

## 2024-07-10 ENCOUNTER — APPOINTMENT (OUTPATIENT)
Dept: SPEECH THERAPY | Facility: CLINIC | Age: 79
End: 2024-07-10
Payer: COMMERCIAL

## 2024-07-14 DIAGNOSIS — I25.10 CORONARY ARTERY DISEASE INVOLVING NATIVE CORONARY ARTERY OF NATIVE HEART WITHOUT ANGINA PECTORIS: ICD-10-CM

## 2024-07-14 PROBLEM — J69.0 ASPIRATION PNEUMONIA OF RIGHT UPPER LOBE DUE TO REGURGITATED FOOD (HCC): Status: RESOLVED | Noted: 2024-06-12 | Resolved: 2024-07-14

## 2024-07-14 RX ORDER — ATORVASTATIN CALCIUM 40 MG/1
TABLET, FILM COATED ORAL
Qty: 100 TABLET | Refills: 1 | Status: SHIPPED | OUTPATIENT
Start: 2024-07-14

## 2024-07-15 ENCOUNTER — APPOINTMENT (OUTPATIENT)
Dept: SPEECH THERAPY | Facility: CLINIC | Age: 79
End: 2024-07-15
Payer: COMMERCIAL

## 2024-07-17 ENCOUNTER — APPOINTMENT (OUTPATIENT)
Dept: SPEECH THERAPY | Facility: CLINIC | Age: 79
End: 2024-07-17
Payer: COMMERCIAL

## 2024-07-19 ENCOUNTER — TELEPHONE (OUTPATIENT)
Dept: FAMILY MEDICINE CLINIC | Facility: HOSPITAL | Age: 79
End: 2024-07-19

## 2024-07-19 NOTE — TELEPHONE ENCOUNTER
Spoke to patient to notify him that the physicians have collectively agreed that prophylactic antibiotics should not be given at this time. Patient understood but was not pleased with this outcome.

## 2024-07-19 NOTE — TELEPHONE ENCOUNTER
----- Message from Radha Pettit DO sent at 7/19/2024  1:37 PM EDT -----  Please notify pt that both GI and Pulm agree NO prophylactic antibiotics should be given.  Call with significant new/worse resp symptoms and would need eval with symptoms.  ----- Message -----  From: Samara Felix DO  Sent: 7/19/2024  10:20 AM EDT  To: Radha Pettit DO; Isaías Hart DO; #    Radha  We have not seen him in over a year.  I dont see anything on most recent admission that a recommendation for prophylactic antibiotics.  I do not recommend that in his case  I know he follows with outside hospital, not sure if they recommended, but then they should prescribe.    So my vote it no to prophylactic antibiotics and if he has any recurrence of symptoms then he should be seen ASAP with CXR prior and if new infiltrate then treat and try to avoid hospitalization    Thea  ----- Message -----  From: Radha Pettit DO  Sent: 7/19/2024   8:32 AM EDT  To: Samara Felix DO; Isaías Hart, DO; #    Rafael Pickard has called my office mult times requesting prophylactic antibiotic for recurrent aspiration pneumonia.  He states someone recommended he should have this during his last hospital stay.  He has dysphagia and refused feeding tube as well as dysphagia diet.  He has DM/MM/CVID.  He is in speech therapy currently.  I read about indications for prophylactic antibiotics for aspiration pneumonia and in general got the feeling they were not recommended as most aspiration results in pneumonitis and not necessarily pneumonia.  However, I didn't know if his CVID/MM obviously increases this risk making the recommendation more of an appropriate option.      Let me know your thoughts.    Thanks for your time    Radha Pettit

## 2024-07-19 NOTE — TELEPHONE ENCOUNTER
Please let pt know I have reached out to pulm, ID and [facility] and have not yet received a reply yet.  I sent a new message again today.  I have not forgotten about his request.  TY

## 2024-07-22 ENCOUNTER — APPOINTMENT (OUTPATIENT)
Dept: SPEECH THERAPY | Facility: CLINIC | Age: 79
End: 2024-07-22
Payer: COMMERCIAL

## 2024-07-24 ENCOUNTER — APPOINTMENT (OUTPATIENT)
Dept: SPEECH THERAPY | Facility: CLINIC | Age: 79
End: 2024-07-24
Payer: COMMERCIAL

## 2024-07-29 ENCOUNTER — APPOINTMENT (OUTPATIENT)
Dept: SPEECH THERAPY | Facility: CLINIC | Age: 79
End: 2024-07-29
Payer: COMMERCIAL

## 2024-07-31 ENCOUNTER — APPOINTMENT (OUTPATIENT)
Dept: SPEECH THERAPY | Facility: CLINIC | Age: 79
End: 2024-07-31
Payer: COMMERCIAL

## 2024-08-08 ENCOUNTER — HOSPITAL ENCOUNTER (OUTPATIENT)
Dept: RADIOLOGY | Facility: HOSPITAL | Age: 79
End: 2024-08-08
Payer: COMMERCIAL

## 2024-08-08 DIAGNOSIS — C90.00 MULTIPLE MYELOMA, REMISSION STATUS UNSPECIFIED (HCC): ICD-10-CM

## 2024-08-08 DIAGNOSIS — R13.10 DYSPHAGIA, UNSPECIFIED TYPE: ICD-10-CM

## 2024-08-08 PROCEDURE — 92611 MOTION FLUOROSCOPY/SWALLOW: CPT

## 2024-08-08 PROCEDURE — 74230 X-RAY XM SWLNG FUNCJ C+: CPT

## 2024-08-08 NOTE — PROCEDURES
Video Swallow Study      Patient Name: Gabe Pickard  Today's Date: 8/8/2024        Past Medical History  Past Medical History:   Diagnosis Date    Acute on chronic systolic (congestive) heart failure (HCC) 05/15/2024    Acute respiratory failure with hypoxia (HCC) 04/20/2022    Arthritis     Cancer (HCC)     Cataract     Colitis     Colon polyp     Fatty liver     GERD (gastroesophageal reflux disease)     History of chemotherapy     History of radiation therapy     History of shingles 2018    History of transfusion     Hyperlipidemia     Sepsis without acute organ dysfunction (HCC) 02/04/2023    Shingles 2017        Past Surgical History  Past Surgical History:   Procedure Laterality Date    AORTIC VALVE REPLACEMENT      CARDIAC VALVE REPLACEMENT  2014    aorta    CATARACT EXTRACTION Bilateral     COLECTOMY      COLONOSCOPY  11/2019    Prior right hemicolectomy    CORONARY ARTERY BYPASS GRAFT      DENTAL SURGERY      JOINT REPLACEMENT  January 2019    left knee    KNEE SURGERY      LYMPH NODE BIOPSY  2003    LYMPHADENECTOMY      OTHER SURGICAL HISTORY      MAZE PROCEDURE    OR ARTHRP KNE CONDYLE&PLATU MEDIAL&LAT COMPARTMENTS Left 01/28/2019    Procedure: ARTHROPLASTY LEFT KNEE TOTAL;  Surgeon: Darell Delgado MD;  Location:  MAIN OR;  Service: Orthopedics    OR LARYNGOSCOPY DIRECT OPERATIVE W/BIOPSY Right 04/27/2022    Procedure: DIRECT LARYNGOSCOPY WITH BIOPSY RIGHT PHARYNX, POSSIBLE RIGHT NECK LYMPH NODE BIOPSY; FROZEN SECTION;  Surgeon: Kevin Hardin MD;  Location:  MAIN OR;  Service: ENT    SKIN BIOPSY      UPPER GASTROINTESTINAL ENDOSCOPY  11/2019    Schatzki ring    US GUIDED LYMPH NODE BIOPSY RIGHT  12/14/2021     Modified (Video) Barium Swallow Study    Summary:  Images are on PACS for review.     Pt presents w/ functional oral swallow skills, mod pharyngeal dysphagia. Complete labial seal for retrieval and containment of all materials, no anterior bolus loss  "present. Adequate rotary mastication, brisk bolus transfer and reduced bolus control w/ premature spill over BOT. Reduced BOT retraction resulting in min-mild BOT residue. Delayed swallow initiation w/ bolus head in the valleculae w/ honey thick liquids and solids, bolus head in the pyriforms w/ all other materials. Reduced laryngeal elevation, anterior hyoid excursion, and vestibule closure. Incomplete epiglottic inversion d/t epiglottis abutting against posterior pharyngeal wall. No penetration or aspiration noted w/ honey thick liquids or solids. Penetration w/ epiglottic undercoating noted w/ thin liquids during all trials. Aspiration noted during the secondary swallow of thin liquids via cup sip, ultimately silent, delayed cough response noted though aspirated materials are past tracheal rings. Trace silent aspiration during the swallow w/ cup sip nectar thick liquids. Strategies did not aid in reducing or eliminating penetration/aspiration. Reduced pharyngeal stripping wave resulting in mild pharyngeal residue in the valleculae and pyriforms w/ solids, min-mild residue in the valleculae and posterior pharyngeal wall w/ all other materials, somewhat reduced w/ secondary swallows. Complete esophageal clearance noted on today's VFSS.     VBS findings and recommendations immediately reviewed w/ pt w/ use of images to aid in understanding. Education provided on strategies to optimize swallow safety, including the importance of oral care and s/s aspiration to monitor for and notify medical team of should they arise. Pt verbalized understanding and denied questions at this time. Pt verbalized interest in continued consumption of thin liquids, verbalizing acceptance of risks of aspiration and benefits of thickened liquids based off today's VFSS. Pt reports \"I'll try the thick liquids but I don't want to stop drinking thin\".       Recommendations:  Diet: dental soft/dysphagia 3 textures  Liquids: honey thick " liquids  Meds: whole in puree  Frequent oral care  Upright position  Aspiration Precautions  Results reviewed with: pt, physician  Aspiration precautions.  Repeat MBS as necessary  If a dedicated assessment of the esophagus is desired, consider esophagram/barium swallow or EGD.      Goals:  Pt will tolerate least restrictive diet w/out s/s aspiration or oral/pharyngeal difficulties.    H&P/pertinent provider notes: (PMH noted above)  Rafael Pickard is a 78 y.o. male w/ a PMHx significant for but not limited to basal cell carcinoma of skin, CAD, T2DM, lymphoma, CABG, HTN, presenting as an OP at [facility] for completion of VFSS. Pt has been d/c from speech therapy w/ last tx session being 7/8/24.     Special Studies:  N/A     Previous VBS:  6/12/24 VFSS  Pt w/ mild oral, severe pharyngeal dysphagia. Mildly prolonged mastication/oral organization. Fairly brisk transfers though mild diffuse residue. Severely delayed swallow initiation w/ material pooling in the pyriforms prior to initiation. Epiglottis is unable to invert 2/2 contact w/ PPW due to curvature of cervical spine/pharyngeal mass. Minimal hyo-laryngeal elevation w/ incomplete laryngeal vestibule closure resulting in deep penetration w/ subsequent aspiration of all liquid consistencies. Pt w/ inconsistent response to aspiration events (strong cough w/ thin, throat clear or silent w/ thickened liquids). Unfortunately, strategies not effective in improving airway protection at this time. Significant pharyngeal retention is noted w/ solids, not cleared w/ mult swallows despite max attempts.      Recommendations:  Diet: Deferred at this time, f/u w/ pt/spouse to discuss options and associated risks/benefits   Meds: Non-oral if possible       Does the pt have pain? No  If yes, was nursing made aware/was it addressed? N/A    Swallow Mechanism Exam  Facial: symmetrical  Labial: WFL  Lingual: WFL  Velum: symmetrical  Mandible: adequate ROM  Dentition: full dentures  Vocal  quality:clear/adequate   Volitional Cough: weak   Respiratory Status: on RA     Swallow Information   Current Diet: soft/level 3 diet and honey thick liquids   Baseline Diet: was on mechanically soft and thin liquids during prior hospital admission following acceptance of risk of aspiration given diet deferred d/t aspiration across all liquid consistencies during previous VFSS 6/12/24       Consistencies Administered:  Pt was viewed sitting upright in the lateral and AP positions. Trials administered were consistent with [facility] Protocol: 5-mL thin liquid x2, 20-mL cup sip thin, 40-mL sequential swallow thin, 5-mL nectar thick, 20-mL cup sip nectar thick, 5-mL Honey thick, honey thick cup sip, honey thick sequential cup sip, 5-mL pudding, ½ cookie coated with 3-mL pudding, and 5-mL pudding in the AP position. Strategies trialed: chin tuck w/ cup sip thin liquid, chin tuck, head turn R and head turn L w/ cup sip nectar thick. Pt was also given a barium tablet with pudding.       Oral Impairment:  Lip Closure: Complete   Tongue Control During Bolus Hold: reduced w/ premature spill over BOT  Bolus Preparation/Mastication: Timely rotary mastication   Bolus Transport/Lingual Motion: Brisk   Oral Residue: min-mild BOT residue   Initiation of the Pharyngeal Swallow: delayed swallow initiation w/ bolus head in the valleculae w/ honey thick liquids via cup and tspn as well as solids, bolus head in the pyriforms w/ all other materials     Pharyngeal Impairment:  Soft Palate Elevation: Adequate   Laryngeal Elevation: reduced   Anterior Hyoid Excursion: reduced   Epiglottic Movement: incomplete abutting against PPW  Laryngeal Vestibular Closure: incomplete  Pharyngeal Stripping Wave: reduced   Pharyngeal Contraction: bilateral   PES Opening: reduced w/ noted bony outgrowths around C6 and C7, ?osteophytic spurring   Tongue Base Retraction: reduced   Pharyngeal Residue: mild in the valleculae and pyriforms w/ solids,  min-mild residue in the valleculae and PPW w/ all other materials, somewhat reduced w/ secondary swallows     Screening of Esophageal Impairment   Esophageal Clearance: adequate esophageal clearance noted on today's VFSS       Penetration/Aspiration:  Thin: tspn- penetration noted during the swallow w/ epiglottic undercoating (PAS-3) cleared w/ cued cough (PAS-2), cup- penetration noted during the swallow w/ epiglottic undercoating w/ material at apex of VF (PAS-3) cued cough aided in clearance from apex of VF w/ min epiglottic undercoating remaining, sequential cup sip- deep penetration noted during the swallow to the cords w/ epiglottic undercoating (PAS-3) aspiration noted during secondary swallow d/t epiglottic undercoating (PAS-8) noted delayed cough though aspirated material is past tracheal rings   Nectar: tspn- no penetration or aspiration (PAS-1), cup sip- trace aspiration noted during the swallow w/ epiglottic undercoating, no response to aspiration (PAS-8) cued cough did not aid in ejecting aspirated materials, cup sip w/ chin tuck- penetration noted during the swallow w/ epiglottic undercoating (PAS-3) neptali aspiration noted during secondary swallow, no response (PAS-8), cup sip w/ head turn L and head turn R- penetration noted w/ epiglottic undercoating  Honey: no penetration or aspiration (PAS-1)  Puree: no penetration or aspiration (PAS-1)  Solid: no penetration or aspiration (PAS-1)  Response to Aspiration: delayed cough x1 w/ aspirated material past tracheal rings, silent   Strategies/Efficacy: not effective in eliminating/reducing penetration or aspiration    8-Point Penetration-Aspiration Scale   1 Material does not enter the airway   2 Material enters the airway, remains above the vocal folds, and is ejected  from the  airway    3 Material enters the airway, remains above the vocal folds, and is not ejected from the airway   4 Material enters the airway, contacts the vocal folds, and is ejected  from the airway   5 Material enters the airway, contacts the vocal folds, and is not ejected from the airway    6 Material enters the airway, passes below the vocal folds and is ejected into the larynx or out of the airway    7 Material enters the airway, passes below the vocal folds, and is not ejected from the trachea despite effort    8 Material enters the airway, passes below the vocal folds, and no effort is made to eject

## 2024-08-22 DIAGNOSIS — I25.10 CORONARY ARTERY DISEASE INVOLVING NATIVE CORONARY ARTERY OF NATIVE HEART WITHOUT ANGINA PECTORIS: ICD-10-CM

## 2024-08-22 DIAGNOSIS — I10 ESSENTIAL HYPERTENSION: ICD-10-CM

## 2024-08-22 DIAGNOSIS — I48.0 PAROXYSMAL ATRIAL FIBRILLATION (HCC): ICD-10-CM

## 2024-08-22 DIAGNOSIS — I50.32 CHRONIC DIASTOLIC CONGESTIVE HEART FAILURE (HCC): ICD-10-CM

## 2024-08-22 RX ORDER — METOPROLOL SUCCINATE 100 MG/1
TABLET, EXTENDED RELEASE ORAL
Qty: 90 TABLET | Refills: 1 | Status: SHIPPED | OUTPATIENT
Start: 2024-08-22

## 2024-08-27 LAB
ALBUMIN SERPL-MCNC: 4 G/DL (ref 3.8–4.8)
ALBUMIN/CREAT UR: 35 MG/G CREAT (ref 0–29)
ALP SERPL-CCNC: 122 IU/L (ref 44–121)
ALT SERPL-CCNC: 13 IU/L (ref 0–44)
AST SERPL-CCNC: 17 IU/L (ref 0–40)
BILIRUB SERPL-MCNC: 0.3 MG/DL (ref 0–1.2)
BUN SERPL-MCNC: 12 MG/DL (ref 8–27)
BUN/CREAT SERPL: 15 (ref 10–24)
CALCIUM SERPL-MCNC: 8.8 MG/DL (ref 8.6–10.2)
CHLORIDE SERPL-SCNC: 103 MMOL/L (ref 96–106)
CHOLEST SERPL-MCNC: 92 MG/DL (ref 100–199)
CHOLEST/HDLC SERPL: 3.1 RATIO (ref 0–5)
CO2 SERPL-SCNC: 26 MMOL/L (ref 20–29)
CREAT SERPL-MCNC: 0.8 MG/DL (ref 0.76–1.27)
CREAT UR-MCNC: 49 MG/DL
EGFR: 91 ML/MIN/1.73
EST. AVERAGE GLUCOSE BLD GHB EST-MCNC: 120 MG/DL
GLOBULIN SER-MCNC: 2 G/DL (ref 1.5–4.5)
GLUCOSE SERPL-MCNC: 94 MG/DL (ref 70–99)
HBA1C MFR BLD: 5.8 % (ref 4.8–5.6)
HDLC SERPL-MCNC: 30 MG/DL
LDLC SERPL CALC-MCNC: 44 MG/DL (ref 0–99)
MICROALBUMIN UR-MCNC: 17.3 UG/ML
POTASSIUM SERPL-SCNC: 3.4 MMOL/L (ref 3.5–5.2)
PROT SERPL-MCNC: 6 G/DL (ref 6–8.5)
SL AMB VLDL CHOLESTEROL CALC: 18 MG/DL (ref 5–40)
SODIUM SERPL-SCNC: 143 MMOL/L (ref 134–144)
TRIGL SERPL-MCNC: 93 MG/DL (ref 0–149)

## 2024-08-28 ENCOUNTER — OFFICE VISIT (OUTPATIENT)
Dept: ENDOCRINOLOGY | Facility: HOSPITAL | Age: 79
End: 2024-08-28
Payer: COMMERCIAL

## 2024-08-28 VITALS
HEIGHT: 70 IN | BODY MASS INDEX: 22.05 KG/M2 | WEIGHT: 154 LBS | HEART RATE: 68 BPM | DIASTOLIC BLOOD PRESSURE: 80 MMHG | SYSTOLIC BLOOD PRESSURE: 136 MMHG

## 2024-08-28 DIAGNOSIS — I10 ESSENTIAL HYPERTENSION: ICD-10-CM

## 2024-08-28 DIAGNOSIS — E78.5 DYSLIPIDEMIA: ICD-10-CM

## 2024-08-28 DIAGNOSIS — Z95.1 HX OF CABG: ICD-10-CM

## 2024-08-28 DIAGNOSIS — E11.9 TYPE 2 DIABETES MELLITUS WITHOUT COMPLICATION, WITHOUT LONG-TERM CURRENT USE OF INSULIN (HCC): Primary | ICD-10-CM

## 2024-08-28 PROCEDURE — 99214 OFFICE O/P EST MOD 30 MIN: CPT | Performed by: STUDENT IN AN ORGANIZED HEALTH CARE EDUCATION/TRAINING PROGRAM

## 2024-08-28 NOTE — PROGRESS NOTES
Established Patient Progress Note       Chief Complaint   Patient presents with    Diabetes Type 2      History of Present Illness:     Gabe Pickard is a 78 y.o. male with a history of T2DM since 5-6 years on metformin/jardiance only w/o any microvascular complications with CAD s/p CABG with recent dx of MM on chemotherapy on Dex every month. Last visit 2/24    Current chemotherapy plan:- every month gets chemo and also IVIG infusion and gets decadron through it     Blood Sugar/Glucometer/Pump/CGM review: doesn't check it often  Current regime:-   Metformin 500mg daily, jardiance 25mg (had diarrhea on higher dose of metformin)    Medications tried/failed in past:- None  Hypoglycemic episodes: None  Hyperglycemia:-   denies any blurry vision, headaches. Polyuria d/t lasix use  Diet- reports does have appetite but eats since he knows he needs too  Activity- stays active  Weight- stable    last eye exam- Recently had exam 02/24, no retinopathy  last foot exam - Done 08/24  History of hypertension- Yes on toprol 100mg   History of hyperlipidemia- Yes on lipitor 40mg   Last lipid:- 08/24, LDL <100   Last urine microalbumina:- 08/24- 34  Last HbA1C 8/24 5.8%. 02/24 5.9%  08/23 5.9%, 02/23:- 8.9%, 05/23 6.2%    Social hx:- Does not smoke, drink alcohol or use any other drugs   Family hX:- father and brother have t2DM    Patient Active Problem List   Diagnosis    Basal cell carcinoma of skin    Coronary artery disease involving native heart with angina pectoris, unspecified vessel or lesion type (HCC)    Type 2 diabetes mellitus without complication, without long-term current use of insulin (HCC)    Dyslipidemia    Erectile dysfunction    Fatty liver    Lymphoma (HCC)    Malignant tumor of cecum (HCC)    Multiple myeloma not having achieved remission (HCC)    Primary localized osteoarthritis of right knee    Hx of CABG    Essential hypertension    S/P total knee arthroplasty, left    Chronic diastolic congestive heart  failure (HCC)    History of aortic valve replacement with bioprosthetic valve    Leukocytosis    Paroxysmal atrial fibrillation (HCC)    Pharyngeal dysphagia    Personal history of colon cancer    Roberts's esophagus without dysplasia    Schatzki's ring of distal esophagus    Gastroesophageal reflux disease with esophagitis    Iron deficiency anemia    Mouth ulcers    Chest pain    Glossitis    CVID (common variable immunodeficiency) (HCC)    Foraminal stenosis of cervical region    Cervical radiculopathy    Platelets decreased (HCC)    Dysphagia      Past Medical History:   Diagnosis Date    Acute on chronic systolic (congestive) heart failure (HCC) 05/15/2024    Acute respiratory failure with hypoxia (HCC) 04/20/2022    Arthritis     Cancer (HCC)     Cataract     Colitis     Colon polyp     Fatty liver     GERD (gastroesophageal reflux disease)     History of chemotherapy     History of radiation therapy     History of shingles 2018    History of transfusion     Hyperlipidemia     Sepsis without acute organ dysfunction (HCC) 02/04/2023    Shingles 2017      Past Surgical History:   Procedure Laterality Date    AORTIC VALVE REPLACEMENT      CARDIAC VALVE REPLACEMENT  2014    aorta    CATARACT EXTRACTION Bilateral     COLECTOMY      COLONOSCOPY  11/2019    Prior right hemicolectomy    CORONARY ARTERY BYPASS GRAFT      DENTAL SURGERY      JOINT REPLACEMENT  January 2019    left knee    KNEE SURGERY      LYMPH NODE BIOPSY  2003    LYMPHADENECTOMY      OTHER SURGICAL HISTORY      MAZE PROCEDURE    DC ARTHRP KNE CONDYLE&PLATU MEDIAL&LAT COMPARTMENTS Left 01/28/2019    Procedure: ARTHROPLASTY LEFT KNEE TOTAL;  Surgeon: Darell Delgado MD;  Location:  MAIN OR;  Service: Orthopedics    DC LARYNGOSCOPY DIRECT OPERATIVE W/BIOPSY Right 04/27/2022    Procedure: DIRECT LARYNGOSCOPY WITH BIOPSY RIGHT PHARYNX, POSSIBLE RIGHT NECK LYMPH NODE BIOPSY; FROZEN SECTION;  Surgeon: Kevin Hardin MD;  Location:  MAIN OR;  Service: ENT  "   SKIN BIOPSY      UPPER GASTROINTESTINAL ENDOSCOPY  11/2019    Schatzki ring    US GUIDED LYMPH NODE BIOPSY RIGHT  12/14/2021      Family History   Problem Relation Age of Onset    Heart block Family     Coronary artery disease Mother     Thrombosis Mother     Hypertension Mother     Arthritis Mother     Hyperlipidemia Mother     Diabetes Father     Hypertension Father     Arthritis Father     Sudden death Father         cardiac    Diabetes type II Father     Colon cancer Paternal Grandfather     Cancer Paternal Grandfather     Breast cancer Sister     Heart disease Brother         Coronary stents     Social History     Tobacco Use    Smoking status: Former     Current packs/day: 0.00     Average packs/day: 1 pack/day for 42.0 years (42.0 ttl pk-yrs)     Types: Cigarettes     Start date: 1967     Quit date: 1/1/2009     Years since quitting: 15.6    Smokeless tobacco: Never   Substance Use Topics    Alcohol use: Yes     Alcohol/week: 2.0 standard drinks of alcohol     Types: 2 Cans of beer per week     Comment: very rare \"beer here and there\"     Allergies   Allergen Reactions    Ceftin [Cefuroxime] Itching     However, has tolerated Cefazolin and Pip-Tazo since, which have different side chains. Be cautious with / avoid 2nd, 3rd, or 4th Gen Cephs that have similar side chains to Cefuroxime.    Lantus [Insulin Glargine] Itching       Current Outpatient Medications:     aspirin 81 MG tablet, Take 1 tablet (81 mg total) by mouth daily, Disp: , Rfl:     atorvastatin (LIPITOR) 40 mg tablet, TAKE 1 TABLET BY MOUTH EVERY DAY, Disp: 100 tablet, Rfl: 1    cholecalciferol (VITAMIN D3) 1,000 units tablet, Take 2 tablets (2,000 Units total) by mouth daily, Disp: 60 tablet, Rfl: 0    Coenzyme Q10 (CoQ-10) 100 MG capsule, , Disp: , Rfl:     ferrous sulfate 324 (65 Fe) mg, TAKE 1 TABLET BY MOUTH EVERY DAY WITH BREAKFAST, Disp: 90 tablet, Rfl: 0    furosemide (LASIX) 20 mg tablet, TAKE 1 TABLET BY MOUTH TWICE A DAY, Disp: 180 " "tablet, Rfl: 1    glucose blood test strip, , Disp: , Rfl:     IMMUNE GLOBULIN, HUMAN, IV, Inject 30,000 mg into a catheter in a vein, Disp: , Rfl:     Jardiance 25 MG TABS, TAKE 1 TABLET BY MOUTH EVERY DAY IN THE MORNING, Disp: 90 tablet, Rfl: 1    metFORMIN (GLUCOPHAGE-XR) 500 mg 24 hr tablet, TAKE 3 TABLETS (1,500 MG TOTAL) BY MOUTH DAILY WITH BREAKFAST (Patient taking differently: 500 mg daily with breakfast), Disp: 120 tablet, Rfl: 5    metoprolol succinate (TOPROL-XL) 100 mg 24 hr tablet, TAKE 1 TABLET BY MOUTH EVERY DAY, Disp: 90 tablet, Rfl: 1    montelukast (SINGULAIR) 10 mg tablet, TAKE 1 TABLET BY MOUTH EVERY DAY, Disp: 90 tablet, Rfl: 1    multivitamin-minerals (CENTRUM ADULTS) tablet, Take 1 tablet by mouth daily, Disp:  , Rfl: 0    omeprazole (PriLOSEC) 40 MG capsule, TAKE 1 CAPSULE (40 MG TOTAL) BY MOUTH DAILY., Disp: 90 capsule, Rfl: 1    valACYclovir (VALTREX) 500 mg tablet, Take 500 mg by mouth daily, Disp: , Rfl:     Review of Systems   Constitutional:  Negative for diaphoresis, fatigue and unexpected weight change.   Respiratory:  Negative for shortness of breath.    Cardiovascular:  Negative for chest pain and palpitations.   Gastrointestinal:  Negative for constipation and diarrhea.   Endocrine: Positive for polyuria (On lasix). Negative for polydipsia.       Physical Exam:  Body mass index is 22.1 kg/m².  /80   Pulse 68   Ht 5' 10\" (1.778 m)   Wt 69.9 kg (154 lb)   BMI 22.10 kg/m²    Wt Readings from Last 3 Encounters:   08/28/24 69.9 kg (154 lb)   06/26/24 70.5 kg (155 lb 6.4 oz)   06/15/24 71.7 kg (158 lb)       Physical Exam  Constitutional:       Appearance: Normal appearance.   Cardiovascular:      Rate and Rhythm: Normal rate and regular rhythm.      Pulses: Normal pulses.           Dorsalis pedis pulses are 2+ on the right side and 2+ on the left side.   Pulmonary:      Effort: Pulmonary effort is normal.   Abdominal:      General: Abdomen is flat.      Palpations: Abdomen is " soft.   Feet:      Right foot:      Skin integrity: No ulcer, skin breakdown, erythema, warmth, callus or dry skin.      Left foot:      Skin integrity: No ulcer, skin breakdown, erythema, warmth, callus or dry skin.   Skin:     General: Skin is warm.      Capillary Refill: Capillary refill takes less than 2 seconds.   Neurological:      General: No focal deficit present.      Mental Status: He is alert.       Patient's shoes and socks removed.    Right Foot/Ankle   Right Foot Inspection  Skin Exam: skin normal and skin intact. No dry skin, no warmth, no callus, no erythema, no maceration, no abnormal color, no pre-ulcer, no ulcer and no callus.     Toe Exam: ROM and strength within normal limits.     Sensory   Vibration: intact  Monofilament testing: intact    Vascular  Capillary refills: < 3 seconds  The right DP pulse is 2+.     Left Foot/Ankle  Left Foot Inspection  Skin Exam: skin normal and skin intact. No dry skin, no warmth, no erythema, no maceration, normal color, no pre-ulcer, no ulcer and no callus.     Toe Exam: ROM and strength within normal limits.     Sensory   Vibration: intact  Monofilament testing: intact    Vascular  Capillary refills: < 3 seconds  The left DP pulse is 2+.        Labs:    Latest Reference Range & Units 02/01/23 08:49 05/01/23 09:30 08/16/23 08:14 02/21/24 08:12 06/12/24 16:05 08/26/24 07:42   Hemoglobin A1C 4.8 - 5.6 % 8.9 (H) 6.2 (H) 5.9 (H) 5.9 (H) 5.8 (H) 5.8 (H)   (H): Data is abnormally high   Latest Reference Range & Units 02/21/24 08:12 08/26/24 07:42   EXT Creatinine Urine Not Estab. mg/dL 80.1 49.0   Albumin Creat Ratio 0 - 29 mg/g creat 17 35 (H)   Albumin,U,Random Not Estab. ug/mL 13.5 17.3   (H): Data is abnormally high     Latest Reference Range & Units 02/21/24 08:12 08/26/24 07:42   Cholesterol 100 - 199 mg/dL 117 92 (L)   Triglycerides 0 - 149 mg/dL 88 93   HDL >39 mg/dL 31 (L) 30 (L)   LDL Calculated 0 - 99 mg/dL 69 44   VLDL Cholesterol Shaw 5 - 40 mg/dL 17 18    (L): Data is abnormally low     04/14/18 08:44 07/26/19 11:26 03/18/22 11:51   Left Eye Diabetic Retinopathy None (E) None (E) None (E)   (E): External lab result   04/14/18 08:44 07/26/19 11:26 03/18/22 11:51   Right Eye Diabetic Retinopathy None (E) None (E) None (E)   (E): External lab result  Impression & Plan:    Problem List Items Addressed This Visit          Cardiovascular and Mediastinum    Essential hypertension (Chronic)    Relevant Orders    Comprehensive metabolic panel    Albumin / creatinine urine ratio    Lipid panel    Hemoglobin A1C       Endocrine    Type 2 diabetes mellitus without complication, without long-term current use of insulin (HCC) - Primary    Relevant Orders    Comprehensive metabolic panel    Albumin / creatinine urine ratio    Lipid panel    Hemoglobin A1C       Surgery/Wound/Pain    Hx of CABG    Relevant Orders    Comprehensive metabolic panel    Albumin / creatinine urine ratio    Lipid panel    Hemoglobin A1C       Other    Dyslipidemia (Chronic)    Relevant Orders    Comprehensive metabolic panel    Albumin / creatinine urine ratio    Lipid panel    Hemoglobin A1C           Orders Placed This Encounter   Procedures    Comprehensive metabolic panel     This is a patient instruction: Patient fasting for 8 hours or longer recommended.     Standing Status:   Future     Number of Occurrences:   1     Standing Expiration Date:   8/28/2025    Albumin / creatinine urine ratio     Standing Status:   Future     Number of Occurrences:   1     Standing Expiration Date:   8/28/2025    Lipid panel     This is a patient instruction: This test requires patient fasting for 10-12 hours or longer. Drinking of black coffee or black tea is acceptable.     Standing Status:   Future     Number of Occurrences:   1     Standing Expiration Date:   8/28/2025    Hemoglobin A1C     Standing Status:   Future     Number of Occurrences:   1     Standing Expiration Date:   8/28/2025       There are no  Patient Instructions on file for this visit.    Patient is a 77yM with PMHx of well controlled T2DM since 6 years on metformin/jardiance only with complication on CAD s/p CABG in 2014 with dx of multiple myeloma on chemotherapy with some steriod induced hyperglycemia with other PMHx of hypertension, hyperlipidemia who presents today for Diabetes management. Last visit 02/24.     1) T2DM:- Patients HbA1C continuous to stay at prediabetic range at 5.8% now. BG reported are in range. No medication side effects. Recommend continuing with current regime. Continue to check BG once a day, reach out to me if having high or low BG. Repeat labs in 6 months now     Plan   - c/w Metformin 500mg daily   - c/w jardiance 25mg daily   - Check BG once a day- alternate fasting and bedtime  - Repeat HbA1C in 6 months     Screening  - Retinopathy- uptodate  - Lipid- uptodate 08/24 LDL <100, c/w Lipitor 20mg daily, repeat next visit  - Urine microalbumin- Uptodate 08/24, normal, repeat next visit    2) Hyperlipidemia:-  uptodate 08/24, LDL <100, c/w Lipitor 20mg daily  3) Hypertension:- BP in clinic 136/80, c/w toprol     Discussed with the patient and all questioned fully answered. He will call me if any problems arise.    Follow-up appointment in 6 months.     Counseled patient on diagnostic results, prognosis, risk and benefit of treatment options, instruction for management, importance of treatment compliance, Risk  factor reduction and impressions      Lucita La MD

## 2024-09-14 DIAGNOSIS — E11.9 TYPE 2 DIABETES MELLITUS WITHOUT COMPLICATION, WITHOUT LONG-TERM CURRENT USE OF INSULIN (HCC): Chronic | ICD-10-CM

## 2024-09-16 RX ORDER — EMPAGLIFLOZIN 25 MG/1
25 TABLET, FILM COATED ORAL EVERY MORNING
Qty: 90 TABLET | Refills: 1 | Status: SHIPPED | OUTPATIENT
Start: 2024-09-16

## 2024-09-17 NOTE — PROGRESS NOTES
Cardiology Follow Up    Gabe Pickard  1945  [id number]  [facility]  [address]  [facility] [address]  [facility] [address] [phone]    1. Coronary artery disease involving native coronary artery of native heart without angina pectoris  POCT ECG      2. PAD  Chronic Diastolic Congestive Heart Failure       Interval History: Pt made apt. In regards to concerns about whole PET scan w/ findings in heart of severe aortic atherosclerosis. He receives chemo monthly and IVIG weekly. He denies CP, but will have rare episodes of intermittent chest heaviness not associated with activity, rest, or eating. Denies SOB, cough, swelling, syncope, dizziness, or lightheadedness. Appearing euvolemic.    Patient Active Problem List   Diagnosis    Basal cell carcinoma of skin    Coronary artery disease involving native heart with angina pectoris, unspecified vessel or lesion type (HCC)    Type 2 diabetes mellitus without complication, without long-term current use of insulin (HCC)    Dyslipidemia    Erectile dysfunction    Fatty liver    Lymphoma (HCC)    Malignant tumor of cecum (HCC)    Multiple myeloma not having achieved remission (HCC)    Primary localized osteoarthritis of right knee    Hx of CABG    Essential hypertension    S/P total knee arthroplasty, left    Chronic diastolic congestive heart failure (HCC)    History of aortic valve replacement with bioprosthetic valve    Leukocytosis    Paroxysmal atrial fibrillation (HCC)    Pharyngeal dysphagia    Personal history of colon cancer    Roberts's esophagus without dysplasia    Schatzki's ring of distal esophagus    Gastroesophageal reflux disease with esophagitis    Iron deficiency anemia    Mouth ulcers    Chest pain    Glossitis    CVID (common variable immunodeficiency) (HCC)    Sepsis without acute organ dysfunction (HCC)    Foraminal stenosis of cervical region    Cervical  ~You have been referred [facility]. They will call you to schedule an appointment.  Alison Christopher    Scheduling phone number is [phone] for [facility], or [place] location.  If you have not heard from the scheduling office within 2 business days, please call [phone] for ALL other locations.    Discussed the importance of core strengthening, ROM, stretching exercises and how each of these entities is important in decreasing pain and improving long term spine health.  The purpose of physical therapy is to teach you an individualized home exercise program.  These exercises need to be performed every day in order to decrease pain and prevent future occurrences of pain.             Radicular Pain    Radicular pain in either the arm or leg is usually from a bulging disc in the spine. A portion of the herniated disc may press against the nerves as the nerves exit the spine. This causes pain which is felt down the arm or leg. Other causes of radicular pain may include:  Fractures.  Heart disease.  Cancer.  An abnormal and usually degenerative state of the nervous system or nerves (neuropathy).    In most cases, radicular pain is treated without imaging unless symptoms do not start to improve. If that is the case, your provider may order a CT or MRI scan to determine the cause.     Nerves in the cervical spine (neck) may cause radicular pain into the outer shoulder and down the arm. It can spread down to the thumb and fingers. The symptoms vary depending on which nerve root has been affected. In most cases, radicular pain improves with conservative treatment such as physical therapy, cervical traction, medications, and epidural steroid injections. A program for neck rehabilitation with stretching and strengthening exercises is an important part of management. Treatment may take months, and surgery may be considered as a last resort if  "radiculopathy    Platelets decreased (HCC)     Past Medical History:   Diagnosis Date    Acute respiratory failure with hypoxia (HCC) 04/20/2022    Arthritis     Cancer (HCC)     Cataract     Colitis     Colon polyp     Diabetes mellitus (HCC) ????    type 2    Fatty liver     GERD (gastroesophageal reflux disease)     History of chemotherapy     History of radiation therapy     History of shingles 2018    History of transfusion     Hyperlipidemia     Hypertension     Shingles 2017     Social History     Socioeconomic History    Marital status: /Civil Union     Spouse name: Not on file    Number of children: Not on file    Years of education: Not on file    Highest education level: Not on file   Occupational History    Occupation: WORKING FULLTIME    Tobacco Use    Smoking status: Former     Current packs/day: 0.00     Average packs/day: 1 pack/day for 42.0 years (42.0 ttl pk-yrs)     Types: Cigarettes     Start date: 1967     Quit date: 1/1/2009     Years since quitting: 15.3    Smokeless tobacco: Never   Vaping Use    Vaping status: Never Used   Substance and Sexual Activity    Alcohol use: Yes     Alcohol/week: 2.0 standard drinks of alcohol     Types: 2 Cans of beer per week     Comment: very rare \"beer here and there\"    Drug use: No    Sexual activity: Not Currently     Partners: Female     Birth control/protection: None   Other Topics Concern    Not on file   Social History Narrative    Not on file     Social Determinants of Health     Financial Resource Strain: Low Risk  (5/15/2023)    Overall Financial Resource Strain (CARDIA)     Difficulty of Paying Living Expenses: Not hard at all   Food Insecurity: No Food Insecurity (3/24/2023)    Hunger Vital Sign     Worried About Running Out of Food in the Last Year: Never true     Ran Out of Food in the Last Year: Never true   Transportation Needs: No Transportation Needs (5/15/2023)    PRAPARE - Transportation     Lack of Transportation (Medical): No     " "the symptoms do not improve.    Nerves in the lumbar spine (lower back) may cause radicular pain into the hip and down the leg. The commonly used term for this type of pain is \"sciatica\". Conservative treatment is also recommended for this problem. Most patients feel better after 2 to 4 weeks of rest and other supportive care. You should avoid bending, lifting, and all other activities which can make your pain worse. Physical therapy, traction, medications, and epidural steroid injections can be good options to help with your recovery. A program for back injury rehabilitation with stretching and strengthening exercises is an important part of management. Surgery may be considered as a last resort if symptoms do not improve with conservative treatment.     You may take over-the-counter or prescription medicines for pain, discomfort, or fever as directed by your caregiver. Muscle relaxants may help by relieving more severe pain and spasm. Neuropathic medication (such as gabapentin, lyrica, or cymbalta) can help decrease your symptoms of nerve pain as well. Cold or massage can also give significant relief. Spinal manipulation is not recommended as it can increase the degree of disc protrusion. We do not recommend taking narcotic medication such as percocet, oxycodone, norco, dilaudid, or others unless pain is severe and not controlled with any other oral options.    Epidural steroid injections are often effective treatment for radicular pain. These injections deliver strong anti-inflammatory medicine to the area directly around the nerve root in the space between your back bones (vertebrae). Your provider can give you more information about steroid injections. These injections are most effective when given within two weeks of the onset of acute pain. You should see your provider for follow up care as recommended.     In most cases, radicular pain is treated without imaging unless symptoms do not start to improve. If " Lack of Transportation (Non-Medical): No   Physical Activity: Not on file   Stress: Not on file   Social Connections: Not on file   Intimate Partner Violence: Not on file   Housing Stability: Low Risk  (3/24/2023)    Housing Stability Vital Sign     Unable to Pay for Housing in the Last Year: No     Number of Places Lived in the Last Year: 1     Unstable Housing in the Last Year: No      Family History   Problem Relation Age of Onset    Heart block Family     Coronary artery disease Mother     Thrombosis Mother     Hypertension Mother     Arthritis Mother     Hyperlipidemia Mother     Diabetes Father     Hypertension Father     Arthritis Father     Sudden death Father         cardiac    Diabetes type II Father     Colon cancer Paternal Grandfather     Cancer Paternal Grandfather     Breast cancer Sister     Heart disease Brother         Coronary stents     Past Surgical History:   Procedure Laterality Date    AORTIC VALVE REPLACEMENT      CARDIAC VALVE REPLACEMENT  2014    aorta    CATARACT EXTRACTION Bilateral     COLECTOMY      COLONOSCOPY  11/2019    Prior right hemicolectomy    CORONARY ARTERY BYPASS GRAFT      DENTAL SURGERY      JOINT REPLACEMENT  January 2019    left knee    KNEE SURGERY      LYMPH NODE BIOPSY  2003    LYMPHADENECTOMY      OTHER SURGICAL HISTORY      MAZE PROCEDURE    NY ARTHRP KNE CONDYLE&PLATU MEDIAL&LAT COMPARTMENTS Left 01/28/2019    Procedure: ARTHROPLASTY LEFT KNEE TOTAL;  Surgeon: Darell Delgado MD;  Location:  MAIN OR;  Service: Orthopedics    NY LARYNGOSCOPY DIRECT OPERATIVE W/BIOPSY Right 04/27/2022    Procedure: DIRECT LARYNGOSCOPY WITH BIOPSY RIGHT PHARYNX, POSSIBLE RIGHT NECK LYMPH NODE BIOPSY; FROZEN SECTION;  Surgeon: Kevin Hardin MD;  Location:  MAIN OR;  Service: ENT    SKIN BIOPSY      UPPER GASTROINTESTINAL ENDOSCOPY  11/2019    Schatzki ring    US GUIDED LYMPH NODE BIOPSY RIGHT  12/14/2021       Current Outpatient Medications:     aspirin 81 MG tablet, Take 1 tablet  that is the case, your provider may order a CT or MRI scan to determine the cause.     SEEK IMMEDIATE MEDICAL CARE IF:  You develop increased pain, weakness, or numbness in your arm or leg.  You develop difficulty with bladder or bowel control.  You develop abdominal pain.    Document Released: 01/25/2006 Document Revised: 03/11/2013 Document Reviewed: 04/12/2010  Patient Information  2013 [employer].       ~Please call our [facility] Nurse Navigation line [phone] with any questions or concerns about your treatment plan, if symptoms worsen and you would like to be seen urgently, or if you have any new or worsening numbness, weakness, or problems controlling bladder and bowel function.  ~You are also welcome to contact [facility] via [employer], but please be aware that responses to [employer] message may take 2-3 days due to the high volume of patients seen in clinic.        (81 mg total) by mouth daily, Disp: , Rfl:     atorvastatin (LIPITOR) 40 mg tablet, TAKE 1 TABLET BY MOUTH EVERY DAY, Disp: 90 tablet, Rfl: 3    cholecalciferol (VITAMIN D3) 1,000 units tablet, Take 2 tablets (2,000 Units total) by mouth daily, Disp: 60 tablet, Rfl: 0    dexamethasone (DECADRON) 4 mg tablet, , Disp: , Rfl:     ferrous sulfate 324 (65 Fe) mg, TAKE 1 TABLET BY MOUTH EVERY DAY WITH BREAKFAST, Disp: 90 tablet, Rfl: 0    fluorouracil (EFUDEX) 5 % cream, Apply topically 2 (two) times a day, Disp: , Rfl:     furosemide (LASIX) 20 mg tablet, TAKE 1 TABLET BY MOUTH TWICE A DAY, Disp: 180 tablet, Rfl: 0    IMMUNE GLOBULIN, HUMAN, IV, Inject 30,000 mg into a catheter in a vein, Disp: , Rfl:     Jardiance 25 MG TABS, TAKE 1 TABLET BY MOUTH EVERY DAY IN THE MORNING, Disp: 90 tablet, Rfl: 1    metFORMIN (GLUCOPHAGE-XR) 500 mg 24 hr tablet, TAKE 3 TABLETS (1,500 MG TOTAL) BY MOUTH DAILY WITH BREAKFAST, Disp: 120 tablet, Rfl: 5    metoprolol succinate (TOPROL-XL) 100 mg 24 hr tablet, TAKE 1 TABLET BY MOUTH EVERY DAY, Disp: 90 tablet, Rfl: 1    montelukast (SINGULAIR) 10 mg tablet, TAKE 1 TABLET BY MOUTH EVERY DAY, Disp: 90 tablet, Rfl: 1    multivitamin-minerals (CENTRUM ADULTS) tablet, Take 1 tablet by mouth daily, Disp:  , Rfl: 0    omeprazole (PriLOSEC) 40 MG capsule, TAKE 1 CAPSULE (40 MG TOTAL) BY MOUTH DAILY., Disp: 90 capsule, Rfl: 2    valACYclovir (VALTREX) 500 mg tablet, Take 500 mg by mouth daily, Disp: , Rfl:   Allergies   Allergen Reactions    Ceftin [Cefuroxime] Itching     However, has tolerated Cefazolin and Pip-Tazo since, which have different side chains. Be cautious with / avoid 2nd, 3rd, or 4th Gen Cephs that have similar side chains to Cefuroxime.    Lantus [Insulin Glargine] Itching       Labs:  No visits with results within 2 Month(s) from this visit.   Latest known visit with results is:   Appointment on 02/26/2024   Component Date Value    ANTHONY SYNDROME DNA ANGUS* 02/26/2024 Not Detected      HEREDITARY BREAST & OVAR* 02/26/2024 Not Detected     FAMILIAL HYPERCHOLESTERO* 02/26/2024 Not Detected      Imaging: PET CT Whole Body Subsequent    Result Date: 5/14/2024  Narrative: Examination: Total body PET/CT. Date: 5/14/2024 History: Multiple myeloma Procedure: Following the intravenous injection of 12.4 mCi Fluorine-18 FDG, total body PET/CT was performed from the vertex of the skull to the feet. Attenuation correction was applied and fusion images were created from the source data. Blood glucose measured 97 mg/dL at the time of injection. Comparison studies: 12/20/2023 Findings: Head and neck: There is no suspicious uptake throughout the aerodigestive tract. There is no cervical lymphadenopathy. There is no abnormal uptake throughout the subcutaneous tissues of the head and neck. Chest: There is no mediastinal or hilar lymphadenopathy. In the hypermetabolic upper mediastinal lymph node demonstrated previously is no longer hypermetabolic. There is no axillary or internal mammary adenopathy. There is severe aortic atherosclerosis. There are postoperative changes from CABG. There is cardiomegaly. Abdomen/pelvis: There is a focal hypermetabolic area within the left lobe of the liver with SUV max 4.8. A second area of increased uptake is noted within the kaylynn hepatis region with SUV max 5.5, not associated with an obvious anatomic abnormality. No additional abnormal uptake is demonstrated throughout the abdomen and pelvis. Spleen is mildly enlarged. Lower extremities: Negative. Bone marrow: Negative.    Impression: Impression: 1.  Interval resolution of hypermetabolism within the upper mediastinal lymph node. 2.  Focal hypermetabolic area within the left lobe of the liver. This is nonspecific, however, a metastasis cannot be excluded. Consider correlation with MRI of the abdomen. 3.  Focal area of increased uptake in the kaylynn hepatis region without obvious anatomic abnormality. No discrete soft tissue  abnormality is demonstrated. Consider correlation with diagnostic CT abdomen and pelvis or MRI. 4.  No evidence of osseous metastatic disease. Electronically signed by:  Vaibhav Howard MD  05/14/2024 01:45 PM EDT Workstation: [id number]      Review of Systems:  Review of Systems   Constitutional:  Negative for chills, fatigue and fever.   HENT: Negative.     Respiratory: Negative.     Cardiovascular:  Positive for chest pain (intermittent heaviness, not associated w/ any activity or rest). Negative for palpitations and leg swelling.   Gastrointestinal:  Positive for diarrhea.   Musculoskeletal: Negative.    Skin: Negative.    Neurological:  Negative for dizziness, light-headedness and headaches.   Psychiatric/Behavioral: Negative.         Physical Exam:  Physical Exam  Vitals reviewed.   Constitutional:       Comments: Appears fatigued   Neck:      Vascular: No carotid bruit.   Cardiovascular:      Rate and Rhythm: Normal rate and regular rhythm.      Pulses: Normal pulses.      Heart sounds: Murmur heard.   Pulmonary:      Effort: Pulmonary effort is normal.      Breath sounds: Normal breath sounds.   Abdominal:      General: Bowel sounds are normal.      Palpations: Abdomen is soft.   Musculoskeletal:         General: Normal range of motion.      Cervical back: Neck supple.      Right lower leg: No edema.      Left lower leg: No edema.   Skin:     General: Skin is warm and dry.      Coloration: Skin is pale.   Neurological:      Mental Status: He is alert and oriented to person, place, and time. Mental status is at baseline.     Discussion/Summary: Continue current medication regimen- Atorvastatin dose appropriate at this time.

## 2024-09-23 DIAGNOSIS — R07.9 CHEST PAIN: ICD-10-CM

## 2024-09-24 RX ORDER — FUROSEMIDE 20 MG
TABLET ORAL
Qty: 180 TABLET | Refills: 1 | Status: SHIPPED | OUTPATIENT
Start: 2024-09-24

## 2024-10-24 DIAGNOSIS — C90.00 MULTIPLE MYELOMA NOT HAVING ACHIEVED REMISSION (HCC): ICD-10-CM

## 2024-10-24 RX ORDER — MONTELUKAST SODIUM 10 MG/1
10 TABLET ORAL DAILY
Qty: 90 TABLET | Refills: 1 | Status: SHIPPED | OUTPATIENT
Start: 2024-10-24

## 2024-11-21 DIAGNOSIS — R13.19 ESOPHAGEAL DYSPHAGIA: ICD-10-CM

## 2024-11-21 DIAGNOSIS — K22.70 BARRETT'S ESOPHAGUS WITHOUT DYSPLASIA: ICD-10-CM

## 2024-11-21 DIAGNOSIS — K22.2 SCHATZKI'S RING OF DISTAL ESOPHAGUS: ICD-10-CM

## 2024-11-21 DIAGNOSIS — K21.00 GASTROESOPHAGEAL REFLUX DISEASE WITH ESOPHAGITIS: ICD-10-CM

## 2024-11-21 RX ORDER — OMEPRAZOLE 40 MG/1
40 CAPSULE, DELAYED RELEASE ORAL DAILY
Qty: 90 CAPSULE | Refills: 1 | Status: SHIPPED | OUTPATIENT
Start: 2024-11-21

## 2024-11-25 ENCOUNTER — OFFICE VISIT (OUTPATIENT)
Dept: FAMILY MEDICINE CLINIC | Facility: HOSPITAL | Age: 79
End: 2024-11-25
Payer: COMMERCIAL

## 2024-11-25 VITALS
DIASTOLIC BLOOD PRESSURE: 71 MMHG | BODY MASS INDEX: 21.33 KG/M2 | SYSTOLIC BLOOD PRESSURE: 138 MMHG | HEIGHT: 70 IN | TEMPERATURE: 97.8 F | HEART RATE: 77 BPM | WEIGHT: 149 LBS | OXYGEN SATURATION: 96 %

## 2024-11-25 DIAGNOSIS — J69.0 ASPIRATION PNEUMONIA OF RIGHT UPPER LOBE DUE TO REGURGITATED FOOD (HCC): ICD-10-CM

## 2024-11-25 DIAGNOSIS — C90.00 MULTIPLE MYELOMA NOT HAVING ACHIEVED REMISSION (HCC): ICD-10-CM

## 2024-11-25 DIAGNOSIS — R13.13 PHARYNGEAL DYSPHAGIA: Primary | ICD-10-CM

## 2024-11-25 DIAGNOSIS — E11.9 TYPE 2 DIABETES MELLITUS WITHOUT COMPLICATION, WITHOUT LONG-TERM CURRENT USE OF INSULIN (HCC): ICD-10-CM

## 2024-11-25 DIAGNOSIS — D83.9 CVID (COMMON VARIABLE IMMUNODEFICIENCY) (HCC): ICD-10-CM

## 2024-11-25 DIAGNOSIS — I50.32 CHRONIC DIASTOLIC CONGESTIVE HEART FAILURE (HCC): ICD-10-CM

## 2024-11-25 PROCEDURE — 99214 OFFICE O/P EST MOD 30 MIN: CPT | Performed by: INTERNAL MEDICINE

## 2024-11-25 PROCEDURE — G2211 COMPLEX E/M VISIT ADD ON: HCPCS | Performed by: INTERNAL MEDICINE

## 2024-11-25 RX ORDER — METFORMIN HYDROCHLORIDE 500 MG/1
500 TABLET, EXTENDED RELEASE ORAL
Start: 2024-11-25

## 2024-11-25 RX ORDER — FUROSEMIDE 20 MG/1
20 TABLET ORAL DAILY
Start: 2024-11-25 | End: 2024-11-25 | Stop reason: SDUPTHER

## 2024-11-25 RX ORDER — FUROSEMIDE 20 MG/1
20 TABLET ORAL DAILY
Start: 2024-11-25

## 2024-11-25 NOTE — ASSESSMENT & PLAN NOTE
Lab Results   Component Value Date    HGBA1C 5.8 (H) 08/26/2024   DM type 2 without complications - controlled with A1C 5.8 - con't current meds - decreased his Metformin from 3 tab PO q am to 1 tab PO q am - med list updated today, following with Taj, has BW ordered, UTD on DM foot exam (5/24) and eye exam (9/24), on a statin and ASA but no ACE/ARB, will follow  Orders:    metFORMIN (GLUCOPHAGE-XR) 500 mg 24 hr tablet; Take 1 tablet (500 mg total) by mouth daily with breakfast

## 2024-11-25 NOTE — ASSESSMENT & PLAN NOTE
Did speech therapy and feels it helps, following diet, has noted loss of appetite and wgt, on a PPI, is overdue for f/u with Pulm   Orders:    Ambulatory Referral to Pulmonology; Future

## 2024-11-25 NOTE — ASSESSMENT & PLAN NOTE
Wt Readings from Last 3 Encounters:   11/25/24 67.6 kg (149 lb)   08/28/24 69.9 kg (154 lb)   06/26/24 70.5 kg (155 lb 6.4 oz)   Pt decreased Lasix himself from 20 mg bid to 20 mg q day with relapse in symptoms, med list updated, on a beta blocker and ASA/statin but no ACE/ARB, has f/u with Cardio in Dec, call with CV concerns, will follow  Orders:    furosemide (LASIX) 20 mg tablet; Take 1 tablet (20 mg total) by mouth daily

## 2024-11-25 NOTE — ASSESSMENT & PLAN NOTE
Noting new hypermetabolic lesions on PET, had MRI abd, on Mirian/Velcade with Heme/onc, con't tx/meds/imaging and f/u as per specialists

## 2024-11-25 NOTE — PROGRESS NOTES
Name: Gabe Pickard      : 1945      MRN: [id number]  Encounter Provider: Radha Pettit DO  Encounter Date: 2024   Encounter department: [facility] [address]   :  Assessment & Plan  Pharyngeal dysphagia  Did speech therapy and feels it helps, following diet, has noted loss of appetite and wgt, on a PPI, is overdue for f/u with Pulm   Orders:    Ambulatory Referral to Pulmonology; Future    Aspiration pneumonia of right upper lobe due to regurgitated food (HCC)  D/w pt that no prophylactic abx recommended, advised when symptoms occur that he should call for appt asap and will eval pt for imaging/tx at that time, urged to follow speech recommendations for diet, is overdue for f/u with Pulm - urged to f/u with appt  Orders:    Ambulatory Referral to Pulmonology; Future    Type 2 diabetes mellitus without complication, without long-term current use of insulin (HCC)    Lab Results   Component Value Date    HGBA1C 5.8 (H) 2024   DM type 2 without complications - controlled with A1C 5.8 - con't current meds - decreased his Metformin from 3 tab PO q am to 1 tab PO q am - med list updated today, following with Endo, has BW ordered, UTD on DM foot exam () and eye exam (), on a statin and ASA but no ACE/ARB, will follow  Orders:    metFORMIN (GLUCOPHAGE-XR) 500 mg 24 hr tablet; Take 1 tablet (500 mg total) by mouth daily with breakfast    Multiple myeloma not having achieved remission (HCC)  Noting new hypermetabolic lesions on PET, had MRI abd, on Mirian/Velcade with Heme/onc, con't tx/meds/imaging and f/u as per specialists       CVID (common variable immunodeficiency) (HCC)  On IVIg monthly, con't tx and f/u as per Heme?onc       Chronic diastolic congestive heart failure (HCC)  Wt Readings from Last 3 Encounters:   24 67.6 kg (149 lb)   24 69.9 kg (154 lb)   24 70.5 kg (155 lb 6.4 oz)   Pt decreased Lasix himself from 20 mg bid to 20 mg q day  with relapse in symptoms, med list updated, on a beta blocker and ASA/statin but no ACE/ARB, has f/u with Cardio in Dec, call with CV concerns, will follow  Orders:    furosemide (LASIX) 20 mg tablet; Take 1 tablet (20 mg total) by mouth daily         Colonoscopy 1/21 - 5  yrs    BW 8/24 (A1C 5.8)  DM foot exam 5/24  DM eye exam 9/24      History of Present Illness     HPI Pt here for follow up appt    Pt was hospitalized in June for acute respiratory failure d/t aspiration of pneumonia of RUL.  He saw Sindy for TCM late June and had done speech therapy. He had a follow up VBS in Aug.  It was recommended he follow dental soft/dysphagia 3 textures with honey thick liquids and meds whole in puree.  He has found speech therapy helpful.  Pt called asking for prophylactic abx.  I sent a message to both GI and Pulm and they agreed no prophylactic abx should be given.  He was told he was overdue for Pulm f/u and urged to make appt. He has not made that f/u appt yet.  He notes his appetite is decreased and he has lost 9 lbs.  He denies pain/blood in stool/N/V.  He notes intermittent diarrhea.  He is on a PPI daily.     Pt saw Endo (Dr. La) in Aug for f/u DM type 2 - OV note reviewed.  His A1C remains well controlled at 5.8.  No meds were changed. He was told to con't checking his BS once daily and to reach out to Endo with high or low readings.  He is UTD on DM foot exam (5/24) and eye exam (9/24).  He is on a statin and ASA but no ACE or ARB.    Pt saw Heme/Onc (Dr. Farris) in Oct for f/u myeloma/plasmacytoma and CVID - OV note reviewed.  Progression of plasmacytoma despite Mirian/Velcade tx has been noted.  PET in May showed hypermetabolic areas in L lobe of liver and resolution of hypermetabolic areas within the upper mediastinal lymph nodes.  MRI abd was done to further eval liver lesions. MRI showed no suspicious findings in the liver but a 1.2 cm L1 vertebral body enhancing lesion was noted.    Pt has f/u with Cardio  "in Dec.  He notes he decreased his Lasix from 20 mg bid to 20 mg q day. He was urinating a lot and found it bothersome.  He notes with the decrease in the diuretic he has not had any increase in SOB/LE edema/SOB/palp/orthopnea.  His last Echo was 2022. He is on a beta blocker/ASA/statin.  He is not on an ACE/ARB.     Review of Systems   Constitutional:  Positive for appetite change and unexpected weight change. Negative for chills and fever.   HENT:  Positive for trouble swallowing. Negative for congestion.    Eyes:  Negative for pain and visual disturbance.   Respiratory:  Positive for cough. Negative for shortness of breath and wheezing.    Cardiovascular:  Negative for chest pain, palpitations and leg swelling.   Gastrointestinal:  Positive for diarrhea. Negative for abdominal pain, blood in stool, constipation, nausea and vomiting.   Genitourinary:  Positive for frequency. Negative for difficulty urinating and dysuria.   Musculoskeletal:  Negative for arthralgias and gait problem.   Skin:  Negative for rash and wound.   Neurological:  Negative for dizziness and headaches.   Hematological:  Does not bruise/bleed easily.   Psychiatric/Behavioral:  Negative for confusion and dysphoric mood.           Objective   /71 (BP Location: Left arm, Patient Position: Sitting, Cuff Size: Standard)   Pulse 77   Temp 97.8 °F (36.6 °C) (Tympanic)   Ht 5' 10\" (1.778 m)   Wt 67.6 kg (149 lb)   SpO2 96%   BMI 21.38 kg/m²      Physical Exam  Vitals and nursing note reviewed.   Constitutional:       General: He is not in acute distress.     Appearance: He is well-developed. He is not ill-appearing.   HENT:      Head: Normocephalic and atraumatic.      Right Ear: External ear normal.      Left Ear: External ear normal.   Eyes:      General:         Right eye: No discharge.         Left eye: No discharge.      Conjunctiva/sclera: Conjunctivae normal.   Neck:      Trachea: No tracheal deviation.   Cardiovascular:      Rate " and Rhythm: Normal rate and regular rhythm.      Heart sounds: Murmur heard.   Pulmonary:      Effort: Pulmonary effort is normal. No respiratory distress.      Breath sounds: Normal breath sounds. No wheezing, rhonchi or rales.   Abdominal:      General: There is no distension.      Palpations: Abdomen is soft.      Tenderness: There is no abdominal tenderness. There is no guarding or rebound.   Musculoskeletal:      Cervical back: Neck supple.      Right lower leg: No edema.      Left lower leg: No edema.   Skin:     General: Skin is warm and dry.      Coloration: Skin is not pale.      Findings: No rash.   Neurological:      General: No focal deficit present.      Mental Status: He is alert. Mental status is at baseline.      Motor: No abnormal muscle tone.      Gait: Gait normal.   Psychiatric:         Behavior: Behavior normal.         Thought Content: Thought content normal.

## 2024-12-04 ENCOUNTER — OFFICE VISIT (OUTPATIENT)
Age: 79
End: 2024-12-04
Payer: COMMERCIAL

## 2024-12-04 VITALS
BODY MASS INDEX: 22.22 KG/M2 | OXYGEN SATURATION: 99 % | WEIGHT: 155.2 LBS | HEIGHT: 70 IN | DIASTOLIC BLOOD PRESSURE: 74 MMHG | TEMPERATURE: 96.9 F | SYSTOLIC BLOOD PRESSURE: 140 MMHG | HEART RATE: 62 BPM

## 2024-12-04 DIAGNOSIS — R93.89 ABNORMAL CHEST CT: Primary | ICD-10-CM

## 2024-12-04 PROCEDURE — 99214 OFFICE O/P EST MOD 30 MIN: CPT | Performed by: INTERNAL MEDICINE

## 2024-12-04 NOTE — ASSESSMENT & PLAN NOTE
Patient has a history of recurrent pneumonia, likely related to aspiration.  PET/CT performed at outside institution today suggesting left lower lobe opacity.  He does not have any clinical signs or symptoms of pneumonia.  We will obtain images for review with plans to repeat CT in 4 to 6 weeks.  Patient was instructed to call the office in the meantime if he would develop any symptoms that would be suggestive of an active infection.

## 2024-12-04 NOTE — PROGRESS NOTES
Pulmonary Follow Up Note   Gabe Pickard 78 y.o. male MRN: [id number]  12/4/2024      Assessment/Plan:     Abnormal chest CT  Patient has a history of recurrent pneumonia, likely related to aspiration.  PET/CT performed at outside institution today suggesting left lower lobe opacity.  He does not have any clinical signs or symptoms of pneumonia.  We will obtain images for review with plans to repeat CT in 4 to 6 weeks.  Patient was instructed to call the office in the meantime if he would develop any symptoms that would be suggestive of an active infection.    Visit orders:    Diagnoses and all orders for this visit:    Abnormal chest CT  -     CT chest without contrast; Future      History of Present Illness   HPI:  Gabe Pickard is a 78 y.o. male who presents for pulmonary follow-up.  We have seen patient on several occasions, mostly during hospitalizations with most recent being November of last year.  At that time, he was hospitalized with multifocal pneumonia.  He has a history of multiple myeloma, lymphoma and history of dysphagia due to esophageal stricture.  He is on maintenance chemotherapy on a monthly basis.  He underwent PET scan today.  Performed at outside institution.  Report is available for my review.  There is mention of left lower lobe opacity with PET avidity most suggestive of inflammatory/infectious nature.  Patient has no symptoms to suggest infection.  He has no significant cough, wheeze or sputum production.  He does not use inhalers.  He has no fevers or chills.  He does have a history of recurrent aspiration due to dysphagia related to prior radiation therapy.    Review of Systems   Constitutional:  Positive for appetite change.   HENT:  Positive for postnasal drip and trouble swallowing.    Otherwise negative    Medical, Family and Social history reviewed and updated as appropriate    Historical Information   Past Medical History:   Diagnosis Date    Acute on chronic systolic  (congestive) heart failure (HCC) 05/15/2024    Acute respiratory failure with hypoxia (HCC) 04/20/2022    Arthritis     Atrial fibrillation (HCC)     Cancer (HCC)     Cataract     Colitis     Colon polyp     Coronary artery disease 2014    Diabetes mellitus (HCC) ?    type 2    Fatty liver     GERD (gastroesophageal reflux disease)     History of chemotherapy     History of radiation therapy     History of shingles 2018    History of transfusion     Hyperlipidemia     Hypertension     Pneumonia 08/11/22    Sepsis without acute organ dysfunction (HCC) 02/04/2023    Shingles 2017     Past Surgical History:   Procedure Laterality Date    AORTIC VALVE REPLACEMENT      CARDIAC VALVE REPLACEMENT  2014    aorta    CATARACT EXTRACTION Bilateral     COLECTOMY      COLONOSCOPY  11/2019    Prior right hemicolectomy    CORONARY ARTERY BYPASS GRAFT      DENTAL SURGERY      JOINT REPLACEMENT  January 2019    left knee    KNEE SURGERY      LYMPH NODE BIOPSY  2003    LYMPHADENECTOMY      OTHER SURGICAL HISTORY      MAZE PROCEDURE    MD ARTHRP KNE CONDYLE&PLATU MEDIAL&LAT COMPARTMENTS Left 01/28/2019    Procedure: ARTHROPLASTY LEFT KNEE TOTAL;  Surgeon: Darell Delgado MD;  Location:  MAIN OR;  Service: Orthopedics    MD LARYNGOSCOPY DIRECT OPERATIVE W/BIOPSY Right 04/27/2022    Procedure: DIRECT LARYNGOSCOPY WITH BIOPSY RIGHT PHARYNX, POSSIBLE RIGHT NECK LYMPH NODE BIOPSY; FROZEN SECTION;  Surgeon: Kevin Hardin MD;  Location:  MAIN OR;  Service: ENT    SKIN BIOPSY      UPPER GASTROINTESTINAL ENDOSCOPY  11/2019    Schatzki ring    US GUIDED LYMPH NODE BIOPSY RIGHT  12/14/2021     Family History   Problem Relation Age of Onset    Heart block Family     Coronary artery disease Mother     Thrombosis Mother     Hypertension Mother     Arthritis Mother     Hyperlipidemia Mother     Diabetes Father     Hypertension Father     Arthritis Father     Sudden death Father         cardiac    Diabetes type II Father     Colon cancer Paternal  Grandfather     Cancer Paternal Grandfather     Breast cancer Sister     Heart disease Brother         Coronary stents       Social History     Tobacco Use   Smoking Status Former    Current packs/day: 0.00    Average packs/day: 1 pack/day for 42.0 years (42.0 ttl pk-yrs)    Types: Cigarettes    Start date: 1967    Quit date: 1/1/2009    Years since quitting: 15.9   Smokeless Tobacco Never     Meds/Allergies     Current Outpatient Medications:     aspirin 81 MG tablet, Take 1 tablet (81 mg total) by mouth daily, Disp: , Rfl:     atorvastatin (LIPITOR) 40 mg tablet, TAKE 1 TABLET BY MOUTH EVERY DAY, Disp: 100 tablet, Rfl: 1    cholecalciferol (VITAMIN D3) 1,000 units tablet, Take 2 tablets (2,000 Units total) by mouth daily, Disp: 60 tablet, Rfl: 0    Coenzyme Q10 (CoQ-10) 100 MG capsule, , Disp: , Rfl:     ferrous sulfate 324 (65 Fe) mg, TAKE 1 TABLET BY MOUTH EVERY DAY WITH BREAKFAST, Disp: 90 tablet, Rfl: 0    furosemide (LASIX) 20 mg tablet, Take 1 tablet (20 mg total) by mouth daily, Disp: , Rfl:     glucose blood test strip, , Disp: , Rfl:     IMMUNE GLOBULIN, HUMAN, IV, Inject 30,000 mg into a catheter in a vein, Disp: , Rfl:     Jardiance 25 MG TABS, TAKE 1 TABLET BY MOUTH EVERY DAY IN THE MORNING, Disp: 90 tablet, Rfl: 1    metFORMIN (GLUCOPHAGE-XR) 500 mg 24 hr tablet, Take 1 tablet (500 mg total) by mouth daily with breakfast, Disp: , Rfl:     metoprolol succinate (TOPROL-XL) 100 mg 24 hr tablet, TAKE 1 TABLET BY MOUTH EVERY DAY, Disp: 90 tablet, Rfl: 1    montelukast (SINGULAIR) 10 mg tablet, TAKE 1 TABLET BY MOUTH EVERY DAY, Disp: 90 tablet, Rfl: 1    multivitamin-minerals (CENTRUM ADULTS) tablet, Take 1 tablet by mouth daily, Disp:  , Rfl: 0    omeprazole (PriLOSEC) 40 MG capsule, TAKE 1 CAPSULE (40 MG TOTAL) BY MOUTH DAILY., Disp: 90 capsule, Rfl: 1    valACYclovir (VALTREX) 500 mg tablet, Take 500 mg by mouth daily, Disp: , Rfl:   Allergies   Allergen Reactions    Ceftin [Cefuroxime] Itching      "However, has tolerated Cefazolin and Pip-Tazo since, which have different side chains. Be cautious with / avoid 2nd, 3rd, or 4th Gen Cephs that have similar side chains to Cefuroxime.    Lantus [Insulin Glargine] Itching       Vitals: Blood pressure 140/74, pulse 62, temperature (!) 96.9 °F (36.1 °C), temperature source Tympanic, height 5' 10\" (1.778 m), weight 70.4 kg (155 lb 3.2 oz), SpO2 99%. Body mass index is 22.27 kg/m². Oxygen Therapy  SpO2: 99 %    Physical Exam   Physical Exam  Constitutional:       General: He is not in acute distress.     Appearance: Normal appearance.   HENT:      Head: Normocephalic.      Mouth/Throat:      Pharynx: No oropharyngeal exudate.   Eyes:      General: No scleral icterus.  Neck:      Vascular: No JVD.   Cardiovascular:      Rate and Rhythm: Normal rate and regular rhythm.   Pulmonary:      Breath sounds: No wheezing, rhonchi or rales.   Abdominal:      Palpations: Abdomen is soft.      Tenderness: There is no abdominal tenderness.   Musculoskeletal:         General: No deformity.      Cervical back: Neck supple.   Lymphadenopathy:      Cervical: No cervical adenopathy.   Skin:     General: Skin is warm and dry.   Neurological:      Mental Status: He is alert and oriented to person, place, and time.   Psychiatric:         Mood and Affect: Mood normal.       Labs: I have personally reviewed pertinent lab results.  Lab Results   Component Value Date    WBC 7.95 06/14/2024    HGB 11.4 (L) 06/14/2024    HCT 34.9 (L) 06/14/2024    MCV 88 06/14/2024     (L) 06/14/2024     Lab Results   Component Value Date    GLUCOSE 204 (H) 10/13/2017    CALCIUM 8.8 06/15/2024     05/19/2017    K 3.4 (L) 08/26/2024    CO2 26 08/26/2024     08/26/2024    BUN 12 08/26/2024    CREATININE 0.80 08/26/2024     No results found for: \"IGE\"  Lab Results   Component Value Date    ALT 13 08/26/2024    AST 17 08/26/2024    ALKPHOS 114 (H) 06/12/2024    BILITOT 0.9 05/19/2017       Other " Studies: Results Review Statement: I reviewed radiology reports from this admission including:  .  PET scan performed at outside institution on 12/4/2024 reports no signs of active neoplastic disease.  There is uptake in the left lower lobe airspace opacity, suspecting inflammatory in nature.  Follow-up CT or chest x-ray was recommended in 6-12 weeks.    Answers submitted by the patient for this visit:  Pulmonology Questionnaire (Submitted on 11/27/2024)  Chief Complaint: Primary symptoms  Which of the following makes your symptoms worse?: nothing  Which of the following makes your symptoms better?: nothing

## 2024-12-20 ENCOUNTER — OFFICE VISIT (OUTPATIENT)
Dept: CARDIOLOGY CLINIC | Facility: CLINIC | Age: 79
End: 2024-12-20
Payer: COMMERCIAL

## 2024-12-20 VITALS
BODY MASS INDEX: 22.39 KG/M2 | SYSTOLIC BLOOD PRESSURE: 150 MMHG | HEIGHT: 70 IN | WEIGHT: 156.4 LBS | DIASTOLIC BLOOD PRESSURE: 62 MMHG | HEART RATE: 72 BPM

## 2024-12-20 DIAGNOSIS — I48.0 PAROXYSMAL ATRIAL FIBRILLATION (HCC): ICD-10-CM

## 2024-12-20 DIAGNOSIS — Z95.1 HX OF CABG: ICD-10-CM

## 2024-12-20 DIAGNOSIS — I25.10 CORONARY ARTERY DISEASE INVOLVING NATIVE CORONARY ARTERY OF NATIVE HEART WITHOUT ANGINA PECTORIS: Primary | ICD-10-CM

## 2024-12-20 PROCEDURE — 99214 OFFICE O/P EST MOD 30 MIN: CPT | Performed by: PHYSICIAN ASSISTANT

## 2024-12-20 NOTE — PROGRESS NOTES
Cardiology Office Follow Up  Gabe Pickard  1945  [id number]      ASSESSMENT:  Chronic HFpEF  Paroxysmal atrial fibrillation s/p MAZE/ TALIA ligation in 2014  CAD s/p CABG x1 -- LIMA to LAD in 2014  AS s/p bio AVR in 2014  Hypertension  Hyperlipidemia  Type II DM  Roberts's esophagus/Schatzki's ring  Multiple myeloma involving s/p radiation and chemotherapy/IVIG    PLAN:  Update echo while on chemotherapy to ensure stable LV, check AVR function (murmur on exam today)  No CHF symptoms and  compensated at present time; continue Lasix 20 mg daily with additional dose as needed. Also on Jardiance 25 mg daily for dual benefit (CHF/DM)  LDL at goal on Lipitor 40 mg daily.   Maintaining SR, no recent AF occurences. Not on AC due to hematologic issues. Continue Aspirin 81 mg daily.   RTO in 6 months or sooner if needed.     Interval History/ HPI:   Rafael Pickard is a 79 year old male who presents for 6-month follow-up visit.  Follows with cardiologist Dr. Garcia  No CV issues since last visit  Can go up 2 flights of stairs in his home and walks his dogs and few miles daily without exertional discomfort.   Takes Rx everyday no missed doses.   Gets chemo and IVIG regularly. Had IVIG therapy yesterday with steroids. Didn't sleep well last night as a result due to jitteryness which is normal for him. Follows Heme-Onc at [facility].  Has BP cuff at home but only takes on occasion. Usually 120/60s at home. BP today highest in a while. Attributes this to not getting much sleep last night and getting steroids.     Not much of an appetite at baseline due to swallowing difficulties. Lost weight (-10lbs) as of 1 month ago while in the process of getting his dentures replaced. Now gaining back some weight with protein shakes.     Vitals:  150/62    Past Medical History:   Diagnosis Date    Acute on chronic systolic (congestive) heart failure (HCC) 05/15/2024    Acute respiratory failure with hypoxia (HCC) 04/20/2022    Arthritis   "   Atrial fibrillation (HCC)     Cancer (HCC)     Cataract     Colitis     Colon polyp     Coronary artery disease 2014    Diabetes mellitus (HCC) ?    type 2    Fatty liver     GERD (gastroesophageal reflux disease)     History of chemotherapy     History of radiation therapy     History of shingles 2018    History of transfusion     Hyperlipidemia     Hypertension     Pneumonia 08/11/22    Sepsis without acute organ dysfunction (HCC) 02/04/2023    Shingles 2017     Social History     Socioeconomic History    Marital status: /Civil Union     Spouse name: Not on file    Number of children: Not on file    Years of education: Not on file    Highest education level: Not on file   Occupational History    Occupation: WORKING FULLTIME    Tobacco Use    Smoking status: Former     Current packs/day: 0.00     Average packs/day: 1 pack/day for 42.0 years (42.0 ttl pk-yrs)     Types: Cigarettes     Start date: 1967     Quit date: 1/1/2009     Years since quitting: 15.9    Smokeless tobacco: Never   Vaping Use    Vaping status: Never Used   Substance and Sexual Activity    Alcohol use: Yes     Alcohol/week: 2.0 standard drinks of alcohol     Types: 2 Cans of beer per week     Comment: very rare \"beer here and there\"    Drug use: No    Sexual activity: Not Currently     Partners: Female     Birth control/protection: None   Other Topics Concern    Not on file   Social History Narrative    Not on file     Social Drivers of Health     Financial Resource Strain: Low Risk  (5/15/2023)    Overall Financial Resource Strain (CARDIA)     Difficulty of Paying Living Expenses: Not hard at all   Food Insecurity: No Food Insecurity (6/13/2024)    Nursing - Inadequate Food Risk Classification     Worried About Running Out of Food in the Last Year: Never true     Ran Out of Food in the Last Year: Never true     Ran Out of Food in the Last Year: Not on file   Transportation Needs: No Transportation Needs (6/13/2024)    PRAPARE - " Transportation     Lack of Transportation (Medical): No     Lack of Transportation (Non-Medical): No   Physical Activity: Not on file   Stress: Not on file   Social Connections: Not on file   Intimate Partner Violence: Not on file   Housing Stability: Low Risk  (6/13/2024)    Housing Stability Vital Sign     Unable to Pay for Housing in the Last Year: No     Number of Times Moved in the Last Year: 0     Homeless in the Last Year: No      Family History   Problem Relation Age of Onset    Heart block Family     Coronary artery disease Mother     Thrombosis Mother     Hypertension Mother     Arthritis Mother     Hyperlipidemia Mother     Diabetes Father     Hypertension Father     Arthritis Father     Sudden death Father         cardiac    Diabetes type II Father     Colon cancer Paternal Grandfather     Cancer Paternal Grandfather     Breast cancer Sister     Heart disease Brother         Coronary stents     Past Surgical History:   Procedure Laterality Date    AORTIC VALVE REPLACEMENT      CARDIAC VALVE REPLACEMENT  2014    aorta    CATARACT EXTRACTION Bilateral     COLECTOMY      COLONOSCOPY  11/2019    Prior right hemicolectomy    CORONARY ARTERY BYPASS GRAFT      DENTAL SURGERY      JOINT REPLACEMENT  January 2019    left knee    KNEE SURGERY      LYMPH NODE BIOPSY  2003    LYMPHADENECTOMY      OTHER SURGICAL HISTORY      MAZE PROCEDURE    CO ARTHRP KNE CONDYLE&PLATU MEDIAL&LAT COMPARTMENTS Left 01/28/2019    Procedure: ARTHROPLASTY LEFT KNEE TOTAL;  Surgeon: Darell Delgado MD;  Location:  MAIN OR;  Service: Orthopedics    CO LARYNGOSCOPY DIRECT OPERATIVE W/BIOPSY Right 04/27/2022    Procedure: DIRECT LARYNGOSCOPY WITH BIOPSY RIGHT PHARYNX, POSSIBLE RIGHT NECK LYMPH NODE BIOPSY; FROZEN SECTION;  Surgeon: Kevin Hardin MD;  Location:  MAIN OR;  Service: ENT    SKIN BIOPSY      UPPER GASTROINTESTINAL ENDOSCOPY  11/2019    Schatzki ring    US GUIDED LYMPH NODE BIOPSY RIGHT  12/14/2021       Current Outpatient  Medications:     aspirin 81 MG tablet, Take 1 tablet (81 mg total) by mouth daily, Disp: , Rfl:     atorvastatin (LIPITOR) 40 mg tablet, TAKE 1 TABLET BY MOUTH EVERY DAY, Disp: 100 tablet, Rfl: 1    cholecalciferol (VITAMIN D3) 1,000 units tablet, Take 2 tablets (2,000 Units total) by mouth daily, Disp: 60 tablet, Rfl: 0    Coenzyme Q10 (CoQ-10) 100 MG capsule, , Disp: , Rfl:     ferrous sulfate 324 (65 Fe) mg, TAKE 1 TABLET BY MOUTH EVERY DAY WITH BREAKFAST, Disp: 90 tablet, Rfl: 0    furosemide (LASIX) 20 mg tablet, Take 1 tablet (20 mg total) by mouth daily, Disp: , Rfl:     glucose blood test strip, , Disp: , Rfl:     IMMUNE GLOBULIN, HUMAN, IV, Inject 30,000 mg into a catheter in a vein, Disp: , Rfl:     Jardiance 25 MG TABS, TAKE 1 TABLET BY MOUTH EVERY DAY IN THE MORNING, Disp: 90 tablet, Rfl: 1    metFORMIN (GLUCOPHAGE-XR) 500 mg 24 hr tablet, Take 1 tablet (500 mg total) by mouth daily with breakfast, Disp: , Rfl:     metoprolol succinate (TOPROL-XL) 100 mg 24 hr tablet, TAKE 1 TABLET BY MOUTH EVERY DAY, Disp: 90 tablet, Rfl: 1    montelukast (SINGULAIR) 10 mg tablet, TAKE 1 TABLET BY MOUTH EVERY DAY, Disp: 90 tablet, Rfl: 1    multivitamin-minerals (CENTRUM ADULTS) tablet, Take 1 tablet by mouth daily, Disp:  , Rfl: 0    omeprazole (PriLOSEC) 40 MG capsule, TAKE 1 CAPSULE (40 MG TOTAL) BY MOUTH DAILY., Disp: 90 capsule, Rfl: 1    valACYclovir (VALTREX) 500 mg tablet, Take 500 mg by mouth daily, Disp: , Rfl:       Review of Systems:  Review of Systems   Constitutional:  Negative for appetite change, chills, diaphoresis, fatigue and fever.   Respiratory:  Negative for cough, chest tightness and shortness of breath.    Cardiovascular:  Negative for chest pain, palpitations and leg swelling.   Gastrointestinal:  Negative for diarrhea, nausea and vomiting.   Endocrine: Negative for cold intolerance and heat intolerance.   Genitourinary:  Negative for difficulty urinating, dysuria and enuresis.    Musculoskeletal:  Negative for arthralgias, back pain and gait problem.   Allergic/Immunologic: Negative for environmental allergies and food allergies.   Neurological:  Negative for dizziness, facial asymmetry and headaches.   Hematological:  Negative for adenopathy. Does not bruise/bleed easily.   Psychiatric/Behavioral:  Negative for agitation, behavioral problems and confusion.      Physical Exam:  Physical Exam  Constitutional:       Appearance: He is well-developed.   HENT:      Right Ear: External ear normal.      Left Ear: External ear normal.   Eyes:      Pupils: Pupils are equal, round, and reactive to light.   Cardiovascular:      Rate and Rhythm: Normal rate and regular rhythm.      Heart sounds: Murmur heard.      No friction rub. No gallop.   Pulmonary:      Effort: Pulmonary effort is normal.      Breath sounds: Normal breath sounds.   Abdominal:      Palpations: Abdomen is soft.   Musculoskeletal:         General: Normal range of motion.      Cervical back: Normal range of motion.   Skin:     General: Skin is warm and dry.   Neurological:      Mental Status: He is alert and oriented to person, place, and time.      Deep Tendon Reflexes: Reflexes are normal and symmetric.   Psychiatric:         Behavior: Behavior normal.         Thought Content: Thought content normal.         Judgment: Judgment normal.       This note was completed in part utilizing [employer] Direct Software.  Grammatical errors, random word insertions, spelling mistakes, and incomplete sentences can be an occasional consequence of this system secondary to software limitations, ambient noise, and hardware issues.  If you have any questions or concerns about the content, text, or information contained within the body of this dictation, please contact the provider for clarification.

## 2024-12-31 ENCOUNTER — HOSPITAL ENCOUNTER (OUTPATIENT)
Dept: NON INVASIVE DIAGNOSTICS | Age: 79
Discharge: HOME | End: 2024-12-31
Payer: COMMERCIAL

## 2024-12-31 VITALS
DIASTOLIC BLOOD PRESSURE: 62 MMHG | HEIGHT: 70 IN | SYSTOLIC BLOOD PRESSURE: 150 MMHG | BODY MASS INDEX: 22.33 KG/M2 | HEART RATE: 68 BPM | WEIGHT: 156 LBS

## 2024-12-31 DIAGNOSIS — I48.0 PAROXYSMAL ATRIAL FIBRILLATION (HCC): ICD-10-CM

## 2024-12-31 DIAGNOSIS — Z95.1 HX OF CABG: ICD-10-CM

## 2024-12-31 DIAGNOSIS — I25.10 CORONARY ARTERY DISEASE INVOLVING NATIVE CORONARY ARTERY OF NATIVE HEART WITHOUT ANGINA PECTORIS: ICD-10-CM

## 2024-12-31 LAB
AORTIC ROOT: 3.2 CM
AORTIC VALVE MEAN VELOCITY: 17.8 M/S
APICAL FOUR CHAMBER EJECTION FRACTION: 54 %
ASCENDING AORTA: 3 CM
AV AREA BY CONTINUOUS VTI: 1 CM2
AV AREA PEAK VELOCITY: 0.9 CM2
AV LVOT MEAN GRADIENT: 2 MMHG
AV LVOT PEAK GRADIENT: 3 MMHG
AV MEAN GRADIENT: 15 MMHG
AV PEAK GRADIENT: 26 MMHG
AV VALVE AREA: 1.04 CM2
AV VELOCITY RATIO: 0.33
BSA FOR ECHO PROCEDURE: 1.88 M2
DOP CALC AO PEAK VEL: 2.57 M/S
DOP CALC AO VTI: 53.83 CM
DOP CALC LVOT AREA: 2.83 CM2
DOP CALC LVOT CARDIAC INDEX: 2.44 L/MIN/M2
DOP CALC LVOT CARDIAC OUTPUT: 4.59 L/MIN
DOP CALC LVOT DIAMETER: 1.9 CM
DOP CALC LVOT PEAK VEL VTI: 19.71 CM
DOP CALC LVOT PEAK VEL: 0.86 M/S
DOP CALC LVOT STROKE INDEX: 29.3 ML/M2
DOP CALC LVOT STROKE VOLUME: 55
FRACTIONAL SHORTENING: 21 (ref 28–44)
INTERVENTRICULAR SEPTUM IN DIASTOLE (PARASTERNAL SHORT AXIS VIEW): 1.3 CM
INTERVENTRICULAR SEPTUM: 1.3 CM (ref 0.6–1.1)
LAAS-AP2: 24 CM2
LAAS-AP4: 28 CM2
LEFT ATRIUM SIZE: 4.4 CM
LEFT ATRIUM VOLUME (MOD BIPLANE): 93 ML
LEFT ATRIUM VOLUME INDEX (MOD BIPLANE): 49.5 ML/M2
LEFT INTERNAL DIMENSION IN SYSTOLE: 3.8 CM (ref 2.1–4)
LEFT VENTRICLE DIASTOLIC VOLUME (MOD BIPLANE): 160 ML
LEFT VENTRICLE DIASTOLIC VOLUME INDEX (MOD BIPLANE): 85.1 ML/M2
LEFT VENTRICLE SYSTOLIC VOLUME (MOD BIPLANE): 79 ML
LEFT VENTRICLE SYSTOLIC VOLUME INDEX (MOD BIPLANE): 42 ML/M2
LEFT VENTRICULAR INTERNAL DIMENSION IN DIASTOLE: 4.8 CM (ref 3.5–6)
LEFT VENTRICULAR POSTERIOR WALL IN END DIASTOLE: 1.3 CM
LEFT VENTRICULAR STROKE VOLUME: 46 ML
LV EF: 50 %
LVSV (TEICH): 46 ML
MV E'TISSUE VEL-LAT: 8 CM/S
MV E'TISSUE VEL-SEP: 6 CM/S
RIGHT ATRIUM AREA SYSTOLE A4C: 24.5 CM2
RIGHT VENTRICLE ID DIMENSION: 3.6 CM
SL CV LEFT ATRIUM LENGTH A2C: 5.8 CM
SL CV LV EF: 55
SL CV PED ECHO LEFT VENTRICLE DIASTOLIC VOLUME (MOD BIPLANE) 2D: 110 ML
SL CV PED ECHO LEFT VENTRICLE SYSTOLIC VOLUME (MOD BIPLANE) 2D: 64 ML
TR MAX PG: 27 MMHG
TR PEAK VELOCITY: 2.6 M/S
TRICUSPID ANNULAR PLANE SYSTOLIC EXCURSION: 2 CM
TRICUSPID VALVE PEAK REGURGITATION VELOCITY: 2.61 M/S

## 2024-12-31 PROCEDURE — 93306 TTE W/DOPPLER COMPLETE: CPT

## 2024-12-31 PROCEDURE — 93306 TTE W/DOPPLER COMPLETE: CPT | Performed by: INTERNAL MEDICINE

## 2025-01-04 DIAGNOSIS — I25.10 CORONARY ARTERY DISEASE INVOLVING NATIVE CORONARY ARTERY OF NATIVE HEART WITHOUT ANGINA PECTORIS: ICD-10-CM

## 2025-01-06 RX ORDER — ATORVASTATIN CALCIUM 40 MG/1
40 TABLET, FILM COATED ORAL DAILY
Qty: 90 TABLET | Refills: 1 | Status: SHIPPED | OUTPATIENT
Start: 2025-01-06

## 2025-01-13 NOTE — TELEPHONE ENCOUNTER
Called, spoke to [facility]  Message relayed as given  [Comprehensive Assessment Reviewed] : Comprehensive assessment reviewed

## 2025-01-15 ENCOUNTER — HOSPITAL ENCOUNTER (OUTPATIENT)
Dept: CT IMAGING | Facility: HOSPITAL | Age: 80
Discharge: HOME | End: 2025-01-15
Attending: INTERNAL MEDICINE
Payer: COMMERCIAL

## 2025-01-15 DIAGNOSIS — R93.89 ABNORMAL CHEST CT: ICD-10-CM

## 2025-01-15 PROCEDURE — 71250 CT THORAX DX C-: CPT

## 2025-01-24 ENCOUNTER — RESULTS FOLLOW-UP (OUTPATIENT)
Age: 80
End: 2025-01-24

## 2025-01-24 DIAGNOSIS — R93.89 ABNORMAL CHEST CT: Primary | ICD-10-CM

## 2025-02-01 DIAGNOSIS — E11.9 TYPE 2 DIABETES MELLITUS WITHOUT COMPLICATION, WITHOUT LONG-TERM CURRENT USE OF INSULIN (HCC): ICD-10-CM

## 2025-02-01 RX ORDER — METFORMIN HYDROCHLORIDE 500 MG/1
TABLET, EXTENDED RELEASE ORAL
Qty: 120 TABLET | Refills: 5 | Status: SHIPPED | OUTPATIENT
Start: 2025-02-01

## 2025-02-12 ENCOUNTER — APPOINTMENT (OUTPATIENT)
Dept: LAB | Facility: HOSPITAL | Age: 80
End: 2025-02-12
Payer: COMMERCIAL

## 2025-02-12 ENCOUNTER — OFFICE VISIT (OUTPATIENT)
Dept: FAMILY MEDICINE CLINIC | Facility: HOSPITAL | Age: 80
End: 2025-02-12
Payer: COMMERCIAL

## 2025-02-12 ENCOUNTER — HOSPITAL ENCOUNTER (OUTPATIENT)
Dept: CT IMAGING | Facility: HOSPITAL | Age: 80
Discharge: HOME | End: 2025-02-12
Payer: COMMERCIAL

## 2025-02-12 ENCOUNTER — RESULTS FOLLOW-UP (OUTPATIENT)
Dept: FAMILY MEDICINE CLINIC | Facility: HOSPITAL | Age: 80
End: 2025-02-12

## 2025-02-12 VITALS
OXYGEN SATURATION: 98 % | DIASTOLIC BLOOD PRESSURE: 72 MMHG | BODY MASS INDEX: 21.9 KG/M2 | HEART RATE: 80 BPM | WEIGHT: 153 LBS | TEMPERATURE: 97.8 F | SYSTOLIC BLOOD PRESSURE: 122 MMHG | HEIGHT: 70 IN

## 2025-02-12 DIAGNOSIS — R10.84 GENERALIZED ABDOMINAL PAIN: ICD-10-CM

## 2025-02-12 DIAGNOSIS — I48.0 PAROXYSMAL ATRIAL FIBRILLATION (HCC): ICD-10-CM

## 2025-02-12 DIAGNOSIS — C90.00 MULTIPLE MYELOMA NOT HAVING ACHIEVED REMISSION (HCC): ICD-10-CM

## 2025-02-12 DIAGNOSIS — D83.9 CVID (COMMON VARIABLE IMMUNODEFICIENCY) (HCC): ICD-10-CM

## 2025-02-12 DIAGNOSIS — R19.7 DIARRHEA, UNSPECIFIED TYPE: Primary | ICD-10-CM

## 2025-02-12 DIAGNOSIS — R19.7 DIARRHEA, UNSPECIFIED TYPE: ICD-10-CM

## 2025-02-12 DIAGNOSIS — E11.9 TYPE 2 DIABETES MELLITUS WITHOUT COMPLICATION, WITHOUT LONG-TERM CURRENT USE OF INSULIN (HCC): ICD-10-CM

## 2025-02-12 DIAGNOSIS — I50.32 CHRONIC DIASTOLIC CONGESTIVE HEART FAILURE (HCC): ICD-10-CM

## 2025-02-12 LAB
ALBUMIN SERPL BCG-MCNC: 4.1 G/DL (ref 3.5–5)
ALP SERPL-CCNC: 92 U/L (ref 34–104)
ALT SERPL W P-5'-P-CCNC: 11 U/L (ref 7–52)
AMYLASE SERPL-CCNC: 46 IU/L (ref 29–103)
ANION GAP SERPL CALCULATED.3IONS-SCNC: 10 MMOL/L (ref 4–13)
AST SERPL W P-5'-P-CCNC: 15 U/L (ref 13–39)
BASOPHILS # BLD AUTO: 0.06 THOUSANDS/ΜL (ref 0–0.1)
BASOPHILS NFR BLD AUTO: 1 % (ref 0–1)
BILIRUB SERPL-MCNC: 0.67 MG/DL (ref 0.2–1)
BUN SERPL-MCNC: 13 MG/DL (ref 5–25)
CALCIUM SERPL-MCNC: 8.9 MG/DL (ref 8.4–10.2)
CHLORIDE SERPL-SCNC: 101 MMOL/L (ref 96–108)
CO2 SERPL-SCNC: 28 MMOL/L (ref 21–32)
CREAT SERPL-MCNC: 0.9 MG/DL (ref 0.6–1.3)
EOSINOPHIL # BLD AUTO: 0.09 THOUSAND/ΜL (ref 0–0.61)
EOSINOPHIL NFR BLD AUTO: 1 % (ref 0–6)
ERYTHROCYTE [DISTWIDTH] IN BLOOD BY AUTOMATED COUNT: 15.9 % (ref 11.6–15.1)
GFR SERPL CREATININE-BSD FRML MDRD: 81 ML/MIN/1.73SQ M
GLUCOSE P FAST SERPL-MCNC: 87 MG/DL (ref 65–99)
HCT VFR BLD AUTO: 45.7 % (ref 36.5–49.3)
HGB BLD-MCNC: 14.7 G/DL (ref 12–17)
IMM GRANULOCYTES # BLD AUTO: 0.04 THOUSAND/UL (ref 0–0.2)
IMM GRANULOCYTES NFR BLD AUTO: 0 % (ref 0–2)
LIPASE SERPL-CCNC: 25 U/L (ref 11–82)
LYMPHOCYTES # BLD AUTO: 1.09 THOUSANDS/ΜL (ref 0.6–4.47)
LYMPHOCYTES NFR BLD AUTO: 12 % (ref 14–44)
MCH RBC QN AUTO: 27.2 PG (ref 26.8–34.3)
MCHC RBC AUTO-ENTMCNC: 32.2 G/DL (ref 31.4–37.4)
MCV RBC AUTO: 85 FL (ref 82–98)
MONOCYTES # BLD AUTO: 0.81 THOUSAND/ΜL (ref 0.17–1.22)
MONOCYTES NFR BLD AUTO: 9 % (ref 4–12)
NEUTROPHILS # BLD AUTO: 7.1 THOUSANDS/ΜL (ref 1.85–7.62)
NEUTS SEG NFR BLD AUTO: 77 % (ref 43–75)
NRBC BLD AUTO-RTO: 0 /100 WBCS
PLATELET # BLD AUTO: 200 THOUSANDS/UL (ref 149–390)
PMV BLD AUTO: 10 FL (ref 8.9–12.7)
POTASSIUM SERPL-SCNC: 3.3 MMOL/L (ref 3.5–5.3)
PROT SERPL-MCNC: 6.7 G/DL (ref 6.4–8.4)
RBC # BLD AUTO: 5.41 MILLION/UL (ref 3.88–5.62)
SODIUM SERPL-SCNC: 139 MMOL/L (ref 135–147)
T4 FREE SERPL-MCNC: 0.83 NG/DL (ref 0.61–1.12)
TSH SERPL DL<=0.05 MIU/L-ACNC: 6.35 UIU/ML (ref 0.45–4.5)
WBC # BLD AUTO: 9.19 THOUSAND/UL (ref 4.31–10.16)

## 2025-02-12 PROCEDURE — 87186 SC STD MICRODIL/AGAR DIL: CPT

## 2025-02-12 PROCEDURE — 87209 SMEAR COMPLEX STAIN: CPT

## 2025-02-12 PROCEDURE — 83690 ASSAY OF LIPASE: CPT

## 2025-02-12 PROCEDURE — 74177 CT ABD & PELVIS W/CONTRAST: CPT

## 2025-02-12 PROCEDURE — 36415 COLL VENOUS BLD VENIPUNCTURE: CPT

## 2025-02-12 PROCEDURE — 82150 ASSAY OF AMYLASE: CPT

## 2025-02-12 PROCEDURE — 84439 ASSAY OF FREE THYROXINE: CPT

## 2025-02-12 PROCEDURE — 87077 CULTURE AEROBIC IDENTIFY: CPT

## 2025-02-12 PROCEDURE — 80053 COMPREHEN METABOLIC PANEL: CPT

## 2025-02-12 PROCEDURE — 87177 OVA AND PARASITES SMEARS: CPT

## 2025-02-12 PROCEDURE — 87505 NFCT AGENT DETECTION GI: CPT

## 2025-02-12 PROCEDURE — 99214 OFFICE O/P EST MOD 30 MIN: CPT | Performed by: NURSE PRACTITIONER

## 2025-02-12 PROCEDURE — 84443 ASSAY THYROID STIM HORMONE: CPT

## 2025-02-12 PROCEDURE — 85025 COMPLETE CBC W/AUTO DIFF WBC: CPT

## 2025-02-12 RX ADMIN — IOHEXOL 50 ML: 350 INJECTION, SOLUTION INTRAVENOUS at 14:22

## 2025-02-12 RX ADMIN — IOHEXOL 50 ML: 240 INJECTION, SOLUTION INTRATHECAL; INTRAVASCULAR; INTRAVENOUS; ORAL at 14:22

## 2025-02-12 NOTE — ASSESSMENT & PLAN NOTE
Wt Readings from Last 3 Encounters:   02/12/25 69.4 kg (153 lb)   12/31/24 70.8 kg (156 lb)   12/20/24 70.9 kg (156 lb 6.4 oz)     Managed by cardiology

## 2025-02-12 NOTE — PROGRESS NOTES
Name: Gabe Pickard      : 1945      MRN: [id number]  Encounter Provider: DUNG Aguilar  Encounter Date: 2025   Encounter department: [facility] [id number]   :  Assessment & Plan  Diarrhea, unspecified type  Given history of malignancy it is best to obatin imaging.   Check labs and stool.   Orders:    CT abdomen pelvis w contrast; Future    CBC and differential; Future    Comprehensive metabolic panel; Future    Amylase; Future    Lipase; Future    TSH, 3rd generation with Free T4 reflex; Future    Stool Enteric Bacterial Panel by PCR; Future    Ova and parasite examination; Future    Clostridium difficile toxin by PCR with EIA; Future    Generalized abdominal pain    Orders:    CT abdomen pelvis w contrast; Future    CBC and differential; Future    Comprehensive metabolic panel; Future    Amylase; Future    Lipase; Future    TSH, 3rd generation with Free T4 reflex; Future    Stool Enteric Bacterial Panel by PCR; Future    Ova and parasite examination; Future    Clostridium difficile toxin by PCR with EIA; Future    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   Chemo per oncology       Chronic diastolic congestive heart failure (HCC)  Wt Readings from Last 3 Encounters:   25 69.4 kg (153 lb)   24 70.8 kg (156 lb)   24 70.9 kg (156 lb 6.4 oz)     Managed by cardiology               Paroxysmal atrial fibrillation (HCC)  Managed by cardiology       Type 2 diabetes mellitus without complication, without long-term current use of insulin (HCC)    Lab Results   Component Value Date    HGBA1C 5.8 (H) 2024   Managed by PCP                History of Present Illness   Diarrhea for 8 days. Having abdominal pain with this. Liquidy stool. Having 8-9 episodes/day. Last night had 3 episodes within 10 minutes. Has been eating and drinking normally. No N/V. Pain feels  "like a gas pain. Diarrhea is not worsening but not improving. Typically get diarrhea 1 week after chemo infusion but it only lasts a day. Last chemo was one month ago. No fever or chills. Feels fatigued. No lightheadedness. No blood in stool. No sick contact. NO recent antibiotics, raw fish or meat. Abdominal pain comes and goes. Located upper and lower abdomen. Has taken pepto Bismol and Imodium which is not working. CVID on IVIg infusions. H/o malignant tumor of cecum.       Review of Systems   Constitutional:  Positive for fatigue. Negative for appetite change, chills and fever.   Gastrointestinal:  Positive for abdominal pain and diarrhea. Negative for nausea and vomiting.       Objective   /72 (Patient Position: Sitting, Cuff Size: Standard)   Pulse 80   Temp 97.8 °F (36.6 °C) (Tympanic)   Ht 5' 10\" (1.778 m)   Wt 69.4 kg (153 lb)   SpO2 98%   BMI 21.95 kg/m²      Physical Exam  Vitals reviewed.   Constitutional:       General: He is not in acute distress.     Appearance: Normal appearance. He is normal weight. He is not ill-appearing.   Cardiovascular:      Rate and Rhythm: Normal rate and regular rhythm.      Heart sounds: Murmur heard.   Pulmonary:      Effort: Pulmonary effort is normal.      Breath sounds: Normal breath sounds.   Abdominal:      General: Abdomen is flat. Bowel sounds are increased.      Palpations: Abdomen is soft. There is no hepatomegaly or splenomegaly.      Tenderness: There is no abdominal tenderness.   Skin:     General: Skin is warm and dry.   Neurological:      Mental Status: He is alert and oriented to person, place, and time.   Psychiatric:         Mood and Affect: Mood normal.         Behavior: Behavior normal.         Thought Content: Thought content normal.         Judgment: Judgment normal.         "

## 2025-02-13 ENCOUNTER — RESULTS FOLLOW-UP (OUTPATIENT)
Dept: FAMILY MEDICINE CLINIC | Facility: HOSPITAL | Age: 80
End: 2025-02-13

## 2025-02-13 ENCOUNTER — HOSPITAL ENCOUNTER (EMERGENCY)
Facility: HOSPITAL | Age: 80
Discharge: HOME | End: 2025-02-13
Attending: EMERGENCY MEDICINE
Payer: COMMERCIAL

## 2025-02-13 ENCOUNTER — TELEPHONE (OUTPATIENT)
Age: 80
End: 2025-02-13

## 2025-02-13 VITALS
SYSTOLIC BLOOD PRESSURE: 132 MMHG | OXYGEN SATURATION: 99 % | TEMPERATURE: 98.1 F | RESPIRATION RATE: 18 BRPM | HEART RATE: 71 BPM | DIASTOLIC BLOOD PRESSURE: 61 MMHG

## 2025-02-13 DIAGNOSIS — A02.9 SALMONELLA: Primary | ICD-10-CM

## 2025-02-13 DIAGNOSIS — E87.6 HYPOKALEMIA: ICD-10-CM

## 2025-02-13 LAB
ALBUMIN SERPL BCG-MCNC: 3.8 G/DL (ref 3.5–5)
ALP SERPL-CCNC: 81 U/L (ref 34–104)
ALT SERPL W P-5'-P-CCNC: 10 U/L (ref 7–52)
ANION GAP SERPL CALCULATED.3IONS-SCNC: 10 MMOL/L (ref 4–13)
ANISOCYTOSIS BLD QL SMEAR: PRESENT
AST SERPL W P-5'-P-CCNC: 14 U/L (ref 13–39)
ATRIAL RATE: 74 BPM
BACTERIA UR QL AUTO: ABNORMAL /HPF
BASOPHILS # BLD MANUAL: 0 THOUSAND/UL (ref 0–0.1)
BASOPHILS NFR MAR MANUAL: 0 % (ref 0–1)
BILIRUB SERPL-MCNC: 0.81 MG/DL (ref 0.2–1)
BILIRUB UR QL STRIP: NEGATIVE
BUN SERPL-MCNC: 16 MG/DL (ref 5–25)
C COLI+JEJUNI TUF STL QL NAA+PROBE: NEGATIVE
C DIFF TOX GENS STL QL NAA+PROBE: NEGATIVE
CALCIUM SERPL-MCNC: 8.3 MG/DL (ref 8.4–10.2)
CARDIAC TROPONIN I PNL SERPL HS: 24 NG/L (ref ?–50)
CHLORIDE SERPL-SCNC: 100 MMOL/L (ref 96–108)
CLARITY UR: ABNORMAL
CO2 SERPL-SCNC: 22 MMOL/L (ref 21–32)
COLOR UR: YELLOW
CREAT SERPL-MCNC: 1.07 MG/DL (ref 0.6–1.3)
EC STX1+STX2 GENES STL QL NAA+PROBE: NEGATIVE
EOSINOPHIL # BLD MANUAL: 0 THOUSAND/UL (ref 0–0.4)
EOSINOPHIL NFR BLD MANUAL: 0 % (ref 0–6)
ERYTHROCYTE [DISTWIDTH] IN BLOOD BY AUTOMATED COUNT: 15.7 % (ref 11.6–15.1)
FLUAV AG UPPER RESP QL IA.RAPID: NEGATIVE
FLUBV AG UPPER RESP QL IA.RAPID: NEGATIVE
GFR SERPL CREATININE-BSD FRML MDRD: 66 ML/MIN/1.73SQ M
GLUCOSE SERPL-MCNC: 117 MG/DL (ref 65–140)
GLUCOSE UR STRIP-MCNC: ABNORMAL MG/DL
HCT VFR BLD AUTO: 40.5 % (ref 36.5–49.3)
HGB BLD-MCNC: 13.3 G/DL (ref 12–17)
HGB UR QL STRIP.AUTO: ABNORMAL
KETONES UR STRIP-MCNC: ABNORMAL MG/DL
LACTATE SERPL-SCNC: 1 MMOL/L (ref 0.5–2)
LEUKOCYTE ESTERASE UR QL STRIP: NEGATIVE
LIPASE SERPL-CCNC: 9 U/L (ref 11–82)
LYMPHOCYTES # BLD AUTO: 0.56 THOUSAND/UL (ref 0.6–4.47)
LYMPHOCYTES # BLD AUTO: 7 % (ref 14–44)
MCH RBC QN AUTO: 27.1 PG (ref 26.8–34.3)
MCHC RBC AUTO-ENTMCNC: 32.8 G/DL (ref 31.4–37.4)
MCV RBC AUTO: 83 FL (ref 82–98)
MONOCYTES # BLD AUTO: 0.81 THOUSAND/UL (ref 0–1.22)
MONOCYTES NFR BLD: 10 % (ref 4–12)
NEUTROPHILS # BLD MANUAL: 6.68 THOUSAND/UL (ref 1.85–7.62)
NEUTS BAND NFR BLD MANUAL: 13 % (ref 0–8)
NEUTS SEG NFR BLD AUTO: 70 % (ref 43–75)
NITRITE UR QL STRIP: NEGATIVE
NON-SQ EPI CELLS URNS QL MICRO: ABNORMAL /HPF
OVALOCYTES BLD QL SMEAR: PRESENT
P AXIS: 78 DEGREES
PH UR STRIP.AUTO: 5.5 [PH]
PLATELET # BLD AUTO: 169 THOUSANDS/UL (ref 149–390)
PLATELET BLD QL SMEAR: ADEQUATE
PMV BLD AUTO: 9.6 FL (ref 8.9–12.7)
POIKILOCYTOSIS BLD QL SMEAR: PRESENT
POLYCHROMASIA BLD QL SMEAR: PRESENT
POTASSIUM SERPL-SCNC: 2.9 MMOL/L (ref 3.5–5.3)
PR INTERVAL: 216 MS
PROT SERPL-MCNC: 6.3 G/DL (ref 6.4–8.4)
PROT UR STRIP-MCNC: ABNORMAL MG/DL
QRS AXIS: 256 DEGREES
QRSD INTERVAL: 152 MS
QT INTERVAL: 538 MS
QTC INTERVAL: 597 MS
RBC # BLD AUTO: 4.9 MILLION/UL (ref 3.88–5.62)
RBC #/AREA URNS AUTO: ABNORMAL /HPF
RBC MORPH BLD: PRESENT
SALMONELLA SP SPAO STL QL NAA+PROBE: POSITIVE
SARS-COV+SARS-COV-2 AG RESP QL IA.RAPID: NEGATIVE
SHIGELLA SP+EIEC IPAH STL QL NAA+PROBE: NEGATIVE
SODIUM SERPL-SCNC: 132 MMOL/L (ref 135–147)
SP GR UR STRIP.AUTO: 1.02 (ref 1–1.03)
T WAVE AXIS: 83 DEGREES
UROBILINOGEN UR STRIP-ACNC: <2 MG/DL
VENTRICULAR RATE: 74 BPM
WBC # BLD AUTO: 8.05 THOUSAND/UL (ref 4.31–10.16)
WBC #/AREA URNS AUTO: ABNORMAL /HPF

## 2025-02-13 PROCEDURE — 85007 BL SMEAR W/DIFF WBC COUNT: CPT | Performed by: EMERGENCY MEDICINE

## 2025-02-13 PROCEDURE — 80053 COMPREHEN METABOLIC PANEL: CPT | Performed by: EMERGENCY MEDICINE

## 2025-02-13 PROCEDURE — 83605 ASSAY OF LACTIC ACID: CPT | Performed by: EMERGENCY MEDICINE

## 2025-02-13 PROCEDURE — 93005 ELECTROCARDIOGRAM TRACING: CPT

## 2025-02-13 PROCEDURE — 85027 COMPLETE CBC AUTOMATED: CPT | Performed by: EMERGENCY MEDICINE

## 2025-02-13 PROCEDURE — 99284 EMERGENCY DEPT VISIT MOD MDM: CPT

## 2025-02-13 PROCEDURE — 83690 ASSAY OF LIPASE: CPT | Performed by: EMERGENCY MEDICINE

## 2025-02-13 PROCEDURE — 93010 ELECTROCARDIOGRAM REPORT: CPT | Performed by: INTERNAL MEDICINE

## 2025-02-13 PROCEDURE — 87804 INFLUENZA ASSAY W/OPTIC: CPT

## 2025-02-13 PROCEDURE — 36415 COLL VENOUS BLD VENIPUNCTURE: CPT | Performed by: EMERGENCY MEDICINE

## 2025-02-13 PROCEDURE — 96366 THER/PROPH/DIAG IV INF ADDON: CPT

## 2025-02-13 PROCEDURE — 99284 EMERGENCY DEPT VISIT MOD MDM: CPT | Performed by: EMERGENCY MEDICINE

## 2025-02-13 PROCEDURE — 96375 TX/PRO/DX INJ NEW DRUG ADDON: CPT

## 2025-02-13 PROCEDURE — 96365 THER/PROPH/DIAG IV INF INIT: CPT

## 2025-02-13 PROCEDURE — 84484 ASSAY OF TROPONIN QUANT: CPT | Performed by: EMERGENCY MEDICINE

## 2025-02-13 PROCEDURE — 87811 SARS-COV-2 COVID19 W/OPTIC: CPT

## 2025-02-13 PROCEDURE — 96368 THER/DIAG CONCURRENT INF: CPT

## 2025-02-13 PROCEDURE — 81001 URINALYSIS AUTO W/SCOPE: CPT | Performed by: EMERGENCY MEDICINE

## 2025-02-13 RX ORDER — CIPROFLOXACIN 500 MG/1
500 TABLET, FILM COATED ORAL 2 TIMES DAILY
Qty: 28 TABLET | Refills: 0 | Status: SHIPPED | OUTPATIENT
Start: 2025-02-13 | End: 2025-02-14 | Stop reason: ALTCHOICE

## 2025-02-13 RX ORDER — POTASSIUM CHLORIDE 14.9 MG/ML
20 INJECTION INTRAVENOUS ONCE
Status: COMPLETED | OUTPATIENT
Start: 2025-02-13 | End: 2025-02-13

## 2025-02-13 RX ORDER — ONDANSETRON 2 MG/ML
4 INJECTION INTRAMUSCULAR; INTRAVENOUS ONCE
Status: COMPLETED | OUTPATIENT
Start: 2025-02-13 | End: 2025-02-13

## 2025-02-13 RX ORDER — MAGNESIUM SULFATE HEPTAHYDRATE 40 MG/ML
2 INJECTION, SOLUTION INTRAVENOUS ONCE
Status: COMPLETED | OUTPATIENT
Start: 2025-02-13 | End: 2025-02-13

## 2025-02-13 RX ORDER — POTASSIUM CHLORIDE 1500 MG/1
40 TABLET, EXTENDED RELEASE ORAL ONCE
Status: COMPLETED | OUTPATIENT
Start: 2025-02-13 | End: 2025-02-13

## 2025-02-13 RX ORDER — CIPROFLOXACIN 500 MG/1
500 TABLET, FILM COATED ORAL ONCE
Status: COMPLETED | OUTPATIENT
Start: 2025-02-13 | End: 2025-02-13

## 2025-02-13 RX ADMIN — ONDANSETRON 4 MG: 2 INJECTION, SOLUTION INTRAMUSCULAR; INTRAVENOUS at 11:32

## 2025-02-13 RX ADMIN — CIPROFLOXACIN 500 MG: 500 TABLET ORAL at 13:41

## 2025-02-13 RX ADMIN — POTASSIUM CHLORIDE 20 MEQ: 14.9 INJECTION, SOLUTION INTRAVENOUS at 13:27

## 2025-02-13 RX ADMIN — SODIUM CHLORIDE 500 ML: 0.9 INJECTION, SOLUTION INTRAVENOUS at 13:28

## 2025-02-13 RX ADMIN — MAGNESIUM SULFATE HEPTAHYDRATE 2 G: 40 INJECTION, SOLUTION INTRAVENOUS at 13:28

## 2025-02-13 RX ADMIN — POTASSIUM CHLORIDE 40 MEQ: 1500 TABLET, EXTENDED RELEASE ORAL at 13:28

## 2025-02-13 NOTE — TELEPHONE ENCOUNTER
Patient's wife called to discuss the results of the tests that he had done yesterday. She's very concerned because she says that his severe diarrhea symptoms are still continuing and he threw up yesterday he cannot keep anything down. She's also very concerned because he is on chemotherapy so she was asking if someone could please call them back to discuss the results of the test and what they should do from here on     thank you

## 2025-02-13 NOTE — ED PROVIDER NOTES
Time reflects when diagnosis was documented in both MDM as applicable and the Disposition within this note       Time User Action Codes Description Comment    2/13/2025  3:02 PM Patel Wade [A02.9] Salmonella     2/13/2025  3:02 PM Patel Wade [E87.6] Hypokalemia           ED Disposition       ED Disposition   Discharge    Condition   Stable    Date/Time   Thu Feb 13, 2025  3:02 PM    Comment   Gabe Pickard discharge to home/self care.                   Assessment & Plan       Medical Decision Making  79-year-old male with known ileitis presenting with continued diarrhea.  Stool studies positive for Salmonella.  Obtain cardiopulmonary/abdominal evaluation to assess for electrolyte abnormalities, rule out ACS although low suspicion.  Symptom control as needed.  Ciprofloxacin for known Salmonella..  Discussed admission given symptoms however patient able to tolerate oral intake and states he prefers to be discharged with outpatient management.  Return precautions discussed.    Amount and/or Complexity of Data Reviewed  Labs: ordered.    Risk  Prescription drug management.             Medications   potassium chloride 20 mEq IVPB (premix) (20 mEq Intravenous New Bag 2/13/25 1327)   magnesium sulfate 2 g/50 mL IVPB (premix) 2 g (2 g Intravenous New Bag 2/13/25 1328)   ondansetron (ZOFRAN) injection 4 mg (4 mg Intravenous Given 2/13/25 1132)   potassium chloride (Klor-Con M20) CR tablet 40 mEq (40 mEq Oral Given 2/13/25 1328)   sodium chloride 0.9 % bolus 500 mL (0 mL Intravenous Stopped 2/13/25 1454)   ciprofloxacin (CIPRO) tablet 500 mg (500 mg Oral Given 2/13/25 1341)       ED Risk Strat Scores                                              History of Present Illness       Chief Complaint   Patient presents with    Diarrhea     Pt c/o diarrhea for about 8 days and reports that he starting vomiting last night. Pt c/o abd pain.Pt denies CP or SOB.        Past Medical History:   Diagnosis Date    Acute on  chronic systolic (congestive) heart failure (HCC) 05/15/2024    Acute respiratory failure with hypoxia (HCC) 04/20/2022    Arthritis     Atrial fibrillation (HCC)     Cancer (HCC)     Cataract     Colitis     Colon polyp     Coronary artery disease 2014    Diabetes mellitus (HCC) ?    type 2    Fatty liver     GERD (gastroesophageal reflux disease)     History of chemotherapy     History of radiation therapy     History of shingles 2018    History of transfusion     Hyperlipidemia     Hypertension     Pneumonia 08/11/22    Sepsis without acute organ dysfunction (HCC) 02/04/2023    Shingles 2017      Past Surgical History:   Procedure Laterality Date    AORTIC VALVE REPLACEMENT      CARDIAC VALVE REPLACEMENT  2014    aorta    CATARACT EXTRACTION Bilateral     COLECTOMY      COLONOSCOPY  11/2019    Prior right hemicolectomy    CORONARY ARTERY BYPASS GRAFT      DENTAL SURGERY      JOINT REPLACEMENT  January 2019    left knee    KNEE SURGERY      LYMPH NODE BIOPSY  2003    LYMPHADENECTOMY      OTHER SURGICAL HISTORY      MAZE PROCEDURE    NE ARTHRP KNE CONDYLE&PLATU MEDIAL&LAT COMPARTMENTS Left 01/28/2019    Procedure: ARTHROPLASTY LEFT KNEE TOTAL;  Surgeon: Darell Delgado MD;  Location:  MAIN OR;  Service: Orthopedics    NE LARYNGOSCOPY DIRECT OPERATIVE W/BIOPSY Right 04/27/2022    Procedure: DIRECT LARYNGOSCOPY WITH BIOPSY RIGHT PHARYNX, POSSIBLE RIGHT NECK LYMPH NODE BIOPSY; FROZEN SECTION;  Surgeon: Kevin Hardin MD;  Location:  MAIN OR;  Service: ENT    SKIN BIOPSY      UPPER GASTROINTESTINAL ENDOSCOPY  11/2019    Schatzki ring    US GUIDED LYMPH NODE BIOPSY RIGHT  12/14/2021      Family History   Problem Relation Age of Onset    Heart block Family     Coronary artery disease Mother     Thrombosis Mother     Hypertension Mother     Arthritis Mother     Hyperlipidemia Mother     Diabetes Father     Hypertension Father     Arthritis Father     Sudden death Father         cardiac    Diabetes type II Father      "Colon cancer Paternal Grandfather     Cancer Paternal Grandfather     Breast cancer Sister     Heart disease Brother         Coronary stents      Social History     Tobacco Use    Smoking status: Former     Current packs/day: 0.00     Average packs/day: 1 pack/day for 42.0 years (42.0 ttl pk-yrs)     Types: Cigarettes     Start date:      Quit date: 2009     Years since quittin.1    Smokeless tobacco: Never   Vaping Use    Vaping status: Never Used   Substance Use Topics    Alcohol use: Yes     Alcohol/week: 2.0 standard drinks of alcohol     Types: 2 Cans of beer per week     Comment: very rare \"beer here and there\"    Drug use: No      E-Cigarette/Vaping    E-Cigarette Use Never User       E-Cigarette/Vaping Substances    Nicotine No     THC No     CBD No     Flavoring No     Other No     Unknown No       I have reviewed and agree with the history as documented.     79-year-old male with recently diagnosed ileitis presenting with continued symptoms of diarrhea and vomiting.  Patient underwent outpatient CT scan and stool study samples yesterday which resulted with Salmonella.  Patient reports his last episode of vomiting was last night.  Patient was able to tolerate applesauce this morning with his daily medications.  Still reports mild abdominal pain but is improving.  Patient is currently undergoing active chemotherapy treatment.        Review of Systems   Gastrointestinal:  Positive for diarrhea and nausea.           Objective       ED Triage Vitals [25 1044]   Temperature Pulse Blood Pressure Respirations SpO2 Patient Position - Orthostatic VS   98.1 °F (36.7 °C) 88 115/56 16 96 % Sitting      Temp Source Heart Rate Source BP Location FiO2 (%) Pain Score    Oral Monitor Right arm -- 5      Vitals      Date and Time Temp Pulse SpO2 Resp BP Pain Score FACES Pain Rating User   25 1300 -- 72 97 % 20 148/63 -- -- [place]ERETT   25 1200 -- 74 95 % 20 113/56 -- -- [place]T   25 1044 98.1 °F (36.7 " °C) 88 96 % 16 115/56 5 -- RN            Physical Exam  Vitals and nursing note reviewed.   Constitutional:       General: He is not in acute distress.     Appearance: He is well-developed.   HENT:      Head: Normocephalic and atraumatic.      Right Ear: External ear normal.      Left Ear: External ear normal.      Nose: Nose normal.   Eyes:      General: No scleral icterus.  Pulmonary:      Effort: Pulmonary effort is normal. No respiratory distress.   Abdominal:      General: There is no distension.      Palpations: Abdomen is soft.   Musculoskeletal:         General: No deformity. Normal range of motion.      Cervical back: Normal range of motion and neck supple.   Skin:     General: Skin is warm.      Findings: No rash.   Neurological:      General: No focal deficit present.      Mental Status: He is alert.      Gait: Gait normal.   Psychiatric:         Mood and Affect: Mood normal.         Results Reviewed       Procedure Component Value Units Date/Time    Urine Microscopic [[id number]]  (Abnormal) Collected: 02/13/25 1338    Lab Status: Final result Specimen: Urine, Clean Catch Updated: 02/13/25 1354     RBC, UA 4-10 /hpf      WBC, UA 2-4 /hpf      Epithelial Cells Occasional /hpf      Bacteria, UA Occasional /hpf     UA w Reflex to Microscopic w Reflex to Culture [[id number]]  (Abnormal) Collected: 02/13/25 1338    Lab Status: Final result Specimen: Urine, Clean Catch Updated: 02/13/25 1351     Color, UA Yellow     Clarity, UA Slightly Cloudy     Specific Gravity, UA 1.020     pH, UA 5.5     Leukocytes, UA Negative     Nitrite, UA Negative     Protein, UA 30 (1+) mg/dl      Glucose,  (3/10%) mg/dl      Ketones, UA Trace mg/dl      Urobilinogen, UA <2.0 mg/dl      Bilirubin, UA Negative     Occult Blood, UA Trace    RBC Morphology Reflex Test [[id number]] Collected: 02/13/25 1130    Lab Status: Final result Specimen: Blood from Arm, Right Updated: 02/13/25 1301    Manual Differential(PHLEBS Do Not Order)  [[id number]]  (Abnormal) Collected: 02/13/25 1130    Lab Status: Final result Specimen: Blood from Arm, Right Updated: 02/13/25 1206     Segmented % 70 %      Bands % 13 %      Lymphocytes % 7 %      Monocytes % 10 %      Eosinophils % 0 %      Basophils % 0 %      Absolute Neutrophils 6.68 Thousand/uL      Absolute Lymphocytes 0.56 Thousand/uL      Absolute Monocytes 0.81 Thousand/uL      Absolute Eosinophils 0.00 Thousand/uL      Absolute Basophils 0.00 Thousand/uL      Total Counted --     RBC Morphology Present     Platelet Estimate Adequate     Anisocytosis Present     Ovalocytes Present     Poikilocytes Present     Polychromasia Present    CBC and differential [[id number]]  (Abnormal) Collected: 02/13/25 1130    Lab Status: Final result Specimen: Blood from Arm, Right Updated: 02/13/25 1206     WBC 8.05 Thousand/uL      RBC 4.90 Million/uL      Hemoglobin 13.3 g/dL      Hematocrit 40.5 %      MCV 83 fL      MCH 27.1 pg      MCHC 32.8 g/dL      RDW 15.7 %      MPV 9.6 fL      Platelets 169 Thousands/uL     Narrative:      This is an appended report.  These results have been appended to a previously verified report.    HS Troponin 0hr (reflex protocol) [[id number]]  (Normal) Collected: 02/13/25 1130    Lab Status: Final result Specimen: Blood from Arm, Right Updated: 02/13/25 1203     hs TnI 0hr 24 ng/L     HS Troponin I 2hr [[id number]]     Lab Status: No result Specimen: Blood     HS Troponin I 4hr [[id number]]     Lab Status: No result Specimen: Blood     CMP [[id number]]  (Abnormal) Collected: 02/13/25 1131    Lab Status: Final result Specimen: Blood from Arm, Right Updated: 02/13/25 1157     Sodium 132 mmol/L      Potassium 2.9 mmol/L      Chloride 100 mmol/L      CO2 22 mmol/L      ANION GAP 10 mmol/L      BUN 16 mg/dL      Creatinine 1.07 mg/dL      Glucose 117 mg/dL      Calcium 8.3 mg/dL      AST 14 U/L      ALT 10 U/L      Alkaline Phosphatase 81 U/L      Total Protein 6.3 g/dL      Albumin 3.8 g/dL       Total Bilirubin 0.81 mg/dL      eGFR 66 ml/min/1.73sq m     Narrative:      National Kid[employer] guidelines for Chronic Kidney Disease (CKD):     Stage 1 with normal or high GFR (GFR > 90 mL/min/1.73 square meters)    Stage 2 Mild CKD (GFR = 60-89 mL/min/1.73 square meters)    Stage 3A Moderate CKD (GFR = 45-59 mL/min/1.73 square meters)    Stage 3B Moderate CKD (GFR = 30-44 mL/min/1.73 square meters)    Stage 4 Severe CKD (GFR = 15-29 mL/min/1.73 square meters)    Stage 5 End Stage CKD (GFR <15 mL/min/1.73 square meters)  Note: GFR calculation is accurate only with a steady state creatinine    Lipase [[id number]]  (Abnormal) Collected: 02/13/25 1131    Lab Status: Final result Specimen: Blood from Arm, Right Updated: 02/13/25 1157     Lipase 9 u/L     Lactic acid, plasma (w/reflex if result > 2.0) [[id number]]  (Normal) Collected: 02/13/25 1130    Lab Status: Final result Specimen: Blood from Arm, Right Updated: 02/13/25 1156     LACTIC ACID 1.0 mmol/L     Narrative:      Result may be elevated if tourniquet was used during collection.    FLU/COVID Rapid Antigen (30 min. TAT) - Preferred screening test in ED [[id number]]  (Normal) Collected: 02/13/25 1047    Lab Status: Final result Specimen: Nares from Nose Updated: 02/13/25 1109     SARS COV Rapid Antigen Negative     Influenza A Rapid Antigen Negative     Influenza B Rapid Antigen Negative    Narrative:      This test has been performed using the Quidel Roz 2 FLU+SARS Antigen test under the Emergency Use Authorization (EUA). This test has been validated by the  and verified by the performing laboratory. The Roz uses lateral flow immunofluorescent sandwich assay to detect SARS-COV, Influenza A and Influenza B Antigen.     The Quidel Roz 2 SARS Antigen test does not differentiate between SARS-CoV and SARS-CoV-2.     Negative results are presumptive and may be confirmed with a molecular assay, if necessary, for patient management.  Negative results do not rule out SARS-CoV-2 or influenza infection and should not be used as the sole basis for treatment or patient management decisions. A negative test result may occur if the level of antigen in a sample is below the limit of detection of this test.     Positive results are indicative of the presence of viral antigens, but do not rule out bacterial infection or co-infection with other viruses.     All test results should be used as an adjunct to clinical observations and other information available to the provider.    FOR PEDIATRIC PATIENTS - copy/paste COVID Guidelines URL to browser: [email or web address]            No orders to display       Procedures    ED Medication and Procedure Management   Prior to Admission Medications   Prescriptions Last Dose Informant Patient Reported? Taking?   Coenzyme Q10 (CoQ-10) 100 MG capsule  Self Yes No   IMMUNE GLOBULIN, HUMAN, IV  Self Yes No   Sig: Inject 30,000 mg into a catheter in a vein   Jardiance 25 MG TABS  Self No No   Sig: TAKE 1 TABLET BY MOUTH EVERY DAY IN THE MORNING   aspirin 81 MG tablet  Self Yes No   Sig: Take 1 tablet (81 mg total) by mouth daily   atorvastatin (LIPITOR) 40 mg tablet   No No   Sig: TAKE 1 TABLET BY MOUTH EVERY DAY   cholecalciferol (VITAMIN D3) 1,000 units tablet  Self No No   Sig: Take 2 tablets (2,000 Units total) by mouth daily   ferrous sulfate 324 (65 Fe) mg  Self No No   Sig: TAKE 1 TABLET BY MOUTH EVERY DAY WITH BREAKFAST   furosemide (LASIX) 20 mg tablet  Self No No   Sig: Take 1 tablet (20 mg total) by mouth daily   glucose blood test strip  Self Yes No   metFORMIN (GLUCOPHAGE-XR) 500 mg 24 hr tablet   No No   Sig: TAKE 3 TABLETS (1,500 MG TOTAL) BY MOUTH DAILY WITH BREAKFAST   metoprolol succinate (TOPROL-XL) 100 mg 24 hr tablet  Self No No   Sig: TAKE 1 TABLET BY MOUTH EVERY DAY   montelukast (SINGULAIR) 10 mg tablet  Self No No   Sig: TAKE 1 TABLET BY MOUTH  EVERY DAY   multivitamin-minerals (CENTRUM ADULTS) tablet  Self No No   Sig: Take 1 tablet by mouth daily   omeprazole (PriLOSEC) 40 MG capsule  Self No No   Sig: TAKE 1 CAPSULE (40 MG TOTAL) BY MOUTH DAILY.   valACYclovir (VALTREX) 500 mg tablet  Self Yes No   Sig: Take 500 mg by mouth daily      Facility-Administered Medications: None     Patient's Medications   Discharge Prescriptions    CIPROFLOXACIN (CIPRO) 500 MG TABLET    Take 1 tablet (500 mg total) by mouth 2 (two) times a day for 14 days       Start Date: 2/13/2025 End Date: 2/27/2025       Order Dose: 500 mg       Quantity: 28 tablet    Refills: 0     No discharge procedures on file.  ED SEPSIS DOCUMENTATION   Time reflects when diagnosis was documented in both MDM as applicable and the Disposition within this note       Time User Action Codes Description Comment    2/13/2025  3:02 PM Patel Wade [A02.9] Salmonella     2/13/2025  3:02 PM Patel Wade [E87.6] Hypokalemia                  Patel Wade DO  02/13/25 0535

## 2025-02-13 NOTE — TELEPHONE ENCOUNTER
I gave my recommendations in my result message, however if he is now vomiting and cannot keep anything down then he should be seen in the ER>

## 2025-02-14 ENCOUNTER — HOSPITAL ENCOUNTER (EMERGENCY)
Facility: HOSPITAL | Age: 80
Discharge: HOME | End: 2025-02-14
Attending: EMERGENCY MEDICINE
Payer: COMMERCIAL

## 2025-02-14 ENCOUNTER — VBI (OUTPATIENT)
Dept: FAMILY MEDICINE CLINIC | Facility: HOSPITAL | Age: 80
End: 2025-02-14

## 2025-02-14 ENCOUNTER — APPOINTMENT (EMERGENCY)
Dept: CT IMAGING | Facility: HOSPITAL | Age: 80
End: 2025-02-14
Payer: COMMERCIAL

## 2025-02-14 VITALS
BODY MASS INDEX: 20.76 KG/M2 | SYSTOLIC BLOOD PRESSURE: 137 MMHG | RESPIRATION RATE: 18 BRPM | DIASTOLIC BLOOD PRESSURE: 65 MMHG | HEART RATE: 86 BPM | TEMPERATURE: 98.3 F | WEIGHT: 145 LBS | HEIGHT: 70 IN | OXYGEN SATURATION: 96 %

## 2025-02-14 DIAGNOSIS — A02.9 SALMONELLA: ICD-10-CM

## 2025-02-14 DIAGNOSIS — R11.2 NAUSEA AND VOMITING: ICD-10-CM

## 2025-02-14 DIAGNOSIS — R10.13 EPIGASTRIC ABDOMINAL PAIN: Primary | ICD-10-CM

## 2025-02-14 LAB
2HR DELTA HS TROPONIN: 2 NG/L
ALBUMIN SERPL BCG-MCNC: 3.9 G/DL (ref 3.5–5)
ALP SERPL-CCNC: 86 U/L (ref 34–104)
ALT SERPL W P-5'-P-CCNC: 15 U/L (ref 7–52)
ANION GAP SERPL CALCULATED.3IONS-SCNC: 10 MMOL/L (ref 4–13)
ANISOCYTOSIS BLD QL SMEAR: PRESENT
APTT PPP: 37 SECONDS (ref 23–34)
AST SERPL W P-5'-P-CCNC: 23 U/L (ref 13–39)
BASOPHILS # BLD MANUAL: 0 THOUSAND/UL (ref 0–0.1)
BASOPHILS NFR MAR MANUAL: 0 % (ref 0–1)
BILIRUB SERPL-MCNC: 0.49 MG/DL (ref 0.2–1)
BUN SERPL-MCNC: 20 MG/DL (ref 5–25)
CALCIUM SERPL-MCNC: 8.2 MG/DL (ref 8.4–10.2)
CARDIAC TROPONIN I PNL SERPL HS: 34 NG/L (ref ?–50)
CARDIAC TROPONIN I PNL SERPL HS: 36 NG/L (ref ?–50)
CHLORIDE SERPL-SCNC: 102 MMOL/L (ref 96–108)
CO2 SERPL-SCNC: 19 MMOL/L (ref 21–32)
CREAT SERPL-MCNC: 1.23 MG/DL (ref 0.6–1.3)
EOSINOPHIL # BLD MANUAL: 0 THOUSAND/UL (ref 0–0.4)
EOSINOPHIL NFR BLD MANUAL: 0 % (ref 0–6)
ERYTHROCYTE [DISTWIDTH] IN BLOOD BY AUTOMATED COUNT: 15.7 % (ref 11.6–15.1)
GFR SERPL CREATININE-BSD FRML MDRD: 55 ML/MIN/1.73SQ M
GLUCOSE SERPL-MCNC: 110 MG/DL (ref 65–140)
HCT VFR BLD AUTO: 43.6 % (ref 36.5–49.3)
HGB BLD-MCNC: 14.2 G/DL (ref 12–17)
INR PPP: 1.29 (ref 0.85–1.19)
LACTATE SERPL-SCNC: 0.8 MMOL/L (ref 0.5–2)
LIPASE SERPL-CCNC: 19 U/L (ref 11–82)
LYMPHOCYTES # BLD AUTO: 0.85 THOUSAND/UL (ref 0.6–4.47)
LYMPHOCYTES # BLD AUTO: 7 % (ref 14–44)
MCH RBC QN AUTO: 26.9 PG (ref 26.8–34.3)
MCHC RBC AUTO-ENTMCNC: 32.6 G/DL (ref 31.4–37.4)
MCV RBC AUTO: 83 FL (ref 82–98)
MONOCYTES # BLD AUTO: 0.74 THOUSAND/UL (ref 0–1.22)
MONOCYTES NFR BLD: 7 % (ref 4–12)
NEUTROPHILS # BLD MANUAL: 9.03 THOUSAND/UL (ref 1.85–7.62)
NEUTS SEG NFR BLD AUTO: 85 % (ref 43–75)
OVALOCYTES BLD QL SMEAR: PRESENT
PLATELET # BLD AUTO: 188 THOUSANDS/UL (ref 149–390)
PLATELET BLD QL SMEAR: ADEQUATE
PMV BLD AUTO: 10.1 FL (ref 8.9–12.7)
POTASSIUM SERPL-SCNC: 3.6 MMOL/L (ref 3.5–5.3)
PROT SERPL-MCNC: 6.5 G/DL (ref 6.4–8.4)
PROTHROMBIN TIME: 16.6 SECONDS (ref 12.3–15)
RBC # BLD AUTO: 5.27 MILLION/UL (ref 3.88–5.62)
RBC MORPH BLD: PRESENT
SODIUM SERPL-SCNC: 131 MMOL/L (ref 135–147)
VARIANT LYMPHS # BLD AUTO: 1 %
WBC # BLD AUTO: 10.62 THOUSAND/UL (ref 4.31–10.16)

## 2025-02-14 PROCEDURE — 83690 ASSAY OF LIPASE: CPT

## 2025-02-14 PROCEDURE — 83605 ASSAY OF LACTIC ACID: CPT

## 2025-02-14 PROCEDURE — 80053 COMPREHEN METABOLIC PANEL: CPT

## 2025-02-14 PROCEDURE — 99285 EMERGENCY DEPT VISIT HI MDM: CPT

## 2025-02-14 PROCEDURE — 96374 THER/PROPH/DIAG INJ IV PUSH: CPT

## 2025-02-14 PROCEDURE — 96361 HYDRATE IV INFUSION ADD-ON: CPT

## 2025-02-14 PROCEDURE — 85027 COMPLETE CBC AUTOMATED: CPT

## 2025-02-14 PROCEDURE — 36415 COLL VENOUS BLD VENIPUNCTURE: CPT

## 2025-02-14 PROCEDURE — 96375 TX/PRO/DX INJ NEW DRUG ADDON: CPT

## 2025-02-14 PROCEDURE — 99284 EMERGENCY DEPT VISIT MOD MDM: CPT

## 2025-02-14 PROCEDURE — 85007 BL SMEAR W/DIFF WBC COUNT: CPT

## 2025-02-14 PROCEDURE — 84484 ASSAY OF TROPONIN QUANT: CPT

## 2025-02-14 PROCEDURE — 85730 THROMBOPLASTIN TIME PARTIAL: CPT

## 2025-02-14 PROCEDURE — 85610 PROTHROMBIN TIME: CPT

## 2025-02-14 PROCEDURE — 74177 CT ABD & PELVIS W/CONTRAST: CPT

## 2025-02-14 PROCEDURE — 93005 ELECTROCARDIOGRAM TRACING: CPT

## 2025-02-14 RX ORDER — ONDANSETRON 2 MG/ML
4 INJECTION INTRAMUSCULAR; INTRAVENOUS ONCE
Status: COMPLETED | OUTPATIENT
Start: 2025-02-14 | End: 2025-02-14

## 2025-02-14 RX ORDER — SACCHAROMYCES BOULARDII 250 MG
250 CAPSULE ORAL 2 TIMES DAILY
Qty: 28 CAPSULE | Refills: 0 | Status: SHIPPED | OUTPATIENT
Start: 2025-02-14 | End: 2025-02-28

## 2025-02-14 RX ORDER — KETOROLAC TROMETHAMINE 30 MG/ML
15 INJECTION, SOLUTION INTRAMUSCULAR; INTRAVENOUS ONCE
Status: COMPLETED | OUTPATIENT
Start: 2025-02-14 | End: 2025-02-14

## 2025-02-14 RX ORDER — DICYCLOMINE HCL 20 MG
20 TABLET ORAL 4 TIMES DAILY PRN
Qty: 20 TABLET | Refills: 0 | Status: SHIPPED | OUTPATIENT
Start: 2025-02-14 | End: 2025-02-19

## 2025-02-14 RX ORDER — AMOXICILLIN 500 MG/1
500 CAPSULE ORAL 3 TIMES DAILY
Qty: 21 CAPSULE | Refills: 0 | Status: SHIPPED | OUTPATIENT
Start: 2025-02-14 | End: 2025-02-21

## 2025-02-14 RX ORDER — ONDANSETRON 4 MG/1
4 TABLET, ORALLY DISINTEGRATING ORAL EVERY 6 HOURS PRN
Qty: 20 TABLET | Refills: 0 | Status: SHIPPED | OUTPATIENT
Start: 2025-02-14

## 2025-02-14 RX ADMIN — IOHEXOL 100 ML: 350 INJECTION, SOLUTION INTRAVENOUS at 22:00

## 2025-02-14 RX ADMIN — KETOROLAC TROMETHAMINE 15 MG: 30 INJECTION, SOLUTION INTRAMUSCULAR; INTRAVENOUS at 21:23

## 2025-02-14 RX ADMIN — ONDANSETRON 4 MG: 2 INJECTION INTRAMUSCULAR; INTRAVENOUS at 21:24

## 2025-02-14 RX ADMIN — SODIUM CHLORIDE 1000 ML: 0.9 INJECTION, SOLUTION INTRAVENOUS at 21:23

## 2025-02-14 NOTE — TELEPHONE ENCOUNTER
02/14/25 11:53 AM    Patient contacted post ED visit, first outreach attempt made. Message was left for patient to return a call to the [facility] Department at [facility]: Phone [phone].    Thank you.  Debra Alston MA  [facility]

## 2025-02-15 DIAGNOSIS — I48.0 PAROXYSMAL ATRIAL FIBRILLATION (HCC): ICD-10-CM

## 2025-02-15 DIAGNOSIS — I10 ESSENTIAL HYPERTENSION: ICD-10-CM

## 2025-02-15 DIAGNOSIS — I50.32 CHRONIC DIASTOLIC CONGESTIVE HEART FAILURE (HCC): ICD-10-CM

## 2025-02-15 DIAGNOSIS — I25.10 CORONARY ARTERY DISEASE INVOLVING NATIVE CORONARY ARTERY OF NATIVE HEART WITHOUT ANGINA PECTORIS: ICD-10-CM

## 2025-02-15 RX ORDER — METOPROLOL SUCCINATE 100 MG/1
100 TABLET, EXTENDED RELEASE ORAL DAILY
Qty: 90 TABLET | Refills: 1 | Status: SHIPPED | OUTPATIENT
Start: 2025-02-15

## 2025-02-15 NOTE — DISCHARGE INSTRUCTIONS
Start with ice chips and clear fluids and advance your diet as tolerated.  Use the prescribed probiotic to help your bowel health as well as the nausea medicine to prevent nausea and vomiting.    Follow-up with primary care for reevaluation in 3 to 5 days.    Return to the ER if develop severe chest pain, difficulty breathing, persistent nausea or vomiting, new or worsening abdominal pain, weakness, confusion, or lethargy.

## 2025-02-15 NOTE — ED PROVIDER NOTES
Time reflects when diagnosis was documented in both MDM as applicable and the Disposition within this note       Time User Action Codes Description Comment    2/14/2025 11:39 PM Dot Shirley Add [R10.13] Epigastric abdominal pain     2/14/2025 11:39 PM Casper Dot Add [R11.2] Nausea and vomiting     2/14/2025 11:40 PM Asher Shirleya Add [A02.9] Salmonella           ED Disposition       ED Disposition   Discharge    Condition   Stable    Date/Time   Fri Feb 14, 2025 11:39 PM    Comment   Gabe Pickard discharge to home/self care.                   Assessment & Plan       Medical Decision Making  DDx including but not limited to: Gastroenteritis, volvulus, SBO, colitis, diverticulitis    Patient with 10 days of diarrhea, found to have ileitis and Salmonella on CT imaging and testing 2 days ago, started on ciprofloxacin yesterday.  Has since had cessation of diarrhea, however has had an acute increase in episodic abdominal pain as well as nausea and vomiting.  He has been tolerating fluids today but has not been able to tolerate food.  He returns for reevaluation.  Has epigastric and periumbilical abdominal pain on exam, given malignancy of cecum, will obtain repeat CT imaging to further evaluate for abscess, SBO, volvulus.  Will medicate with fluids, Zofran, Toradol.  Will repeat labs to evaluate for EROS and electrolyte derangement.    On reevaluation, patient with notably improved pain after Toradol and complete resolution of nausea with Zofran.  Will start p.o. challenge given stability of workup including improving CT imaging.    Problems Addressed:  Epigastric abdominal pain: acute illness or injury  Nausea and vomiting: acute illness or injury  Salmonella: acute illness or injury    Amount and/or Complexity of Data Reviewed  Independent Historian: spouse  Labs: ordered.  Radiology: ordered. Decision-making details documented in ED Course.  ECG/medicine tests: ordered and independent interpretation  "performed.    Risk  OTC drugs.  Prescription drug management.        ED Course as of 02/14/25 2347   Fri Feb 14, 2025 2310 CT abdomen pelvis with contrast  IMPRESSION:     No acute findings in the abdomen or pelvis. Stable chronic left basilar consolidation.     2315 PO challenge started   2339 The patient tolerated p.o. challenge without difficulty or complaint of nausea or abdominal pain.  He does believe the ciprofloxacin is \"hard on his stomach\".  When looking up guidelines of treatment of Salmonella, he may also be treated with amoxicillin, Bactrim, and ceftriaxone 1 g IV once daily.  Given overall stability and his desire to go home, will plan for discharge to home.  Will start him on probiotic and as needed Zofran.  Will have him continue to monitor symptoms and hold on antibiotic use for the next 1 to 2 days, especially if he has no diarrhea or abdominal pain, if he does have return of diarrhea, will have him start on amoxicillin 500 mg 3 times daily until close follow-up with primary care       Medications   sodium chloride 0.9 % bolus 1,000 mL (0 mL Intravenous Stopped 2/14/25 2223)   ondansetron (ZOFRAN) injection 4 mg (4 mg Intravenous Given 2/14/25 2124)   ketorolac (TORADOL) injection 15 mg (15 mg Intravenous Given 2/14/25 2123)   iohexol (OMNIPAQUE) 350 MG/ML injection (MULTI-DOSE) 100 mL (100 mL Intravenous Given 2/14/25 2200)       ED Risk Strat Scores   HEART Risk Score      Flowsheet Row Most Recent Value   Heart Score Risk Calculator    History 0 Filed at: 02/14/2025 2337   ECG 1 Filed at: 02/14/2025 2337   Age 2 Filed at: 02/14/2025 2337   Risk Factors 2 Filed at: 02/14/2025 2337   Troponin 2 Filed at: 02/14/2025 2337   HEART Score 7 Filed at: 02/14/2025 2337          HEART Risk Score      Flowsheet Row Most Recent Value   Heart Score Risk Calculator    History 0 Filed at: 02/14/2025 2337   ECG 1 Filed at: 02/14/2025 2337   Age 2 Filed at: 02/14/2025 2337   Risk Factors 2 Filed at: 02/14/2025 " 2337   Troponin 2 Filed at: 02/14/2025 2337   HEART Score 7 Filed at: 02/14/2025 2337                                                  History of Present Illness       Chief Complaint   Patient presents with    Vomiting     Started today, seen in ED yesterday and diagnosed with salmonella, having abd pain as well       Past Medical History:   Diagnosis Date    Acute on chronic systolic (congestive) heart failure (HCC) 05/15/2024    Acute respiratory failure with hypoxia (HCC) 04/20/2022    Arthritis     Atrial fibrillation (HCC)     Cancer (HCC)     Cataract     Colitis     Colon polyp     Coronary artery disease 2014    Diabetes mellitus (HCC) ?    type 2    Fatty liver     GERD (gastroesophageal reflux disease)     History of chemotherapy     History of radiation therapy     History of shingles 2018    History of transfusion     Hyperlipidemia     Hypertension     Pneumonia 08/11/22    Sepsis without acute organ dysfunction (HCC) 02/04/2023    Shingles 2017      Past Surgical History:   Procedure Laterality Date    AORTIC VALVE REPLACEMENT      CARDIAC VALVE REPLACEMENT  2014    aorta    CATARACT EXTRACTION Bilateral     COLECTOMY      COLONOSCOPY  11/2019    Prior right hemicolectomy    CORONARY ARTERY BYPASS GRAFT      DENTAL SURGERY      JOINT REPLACEMENT  January 2019    left knee    KNEE SURGERY      LYMPH NODE BIOPSY  2003    LYMPHADENECTOMY      OTHER SURGICAL HISTORY      MAZE PROCEDURE    FL ARTHRP KNE CONDYLE&PLATU MEDIAL&LAT COMPARTMENTS Left 01/28/2019    Procedure: ARTHROPLASTY LEFT KNEE TOTAL;  Surgeon: Darell Delgado MD;  Location:  MAIN OR;  Service: Orthopedics    FL LARYNGOSCOPY DIRECT OPERATIVE W/BIOPSY Right 04/27/2022    Procedure: DIRECT LARYNGOSCOPY WITH BIOPSY RIGHT PHARYNX, POSSIBLE RIGHT NECK LYMPH NODE BIOPSY; FROZEN SECTION;  Surgeon: Kevin Hardin MD;  Location:  MAIN OR;  Service: ENT    SKIN BIOPSY      UPPER GASTROINTESTINAL ENDOSCOPY  11/2019    Schatzki ring    US GUIDED  "LYMPH NODE BIOPSY RIGHT  2021      Family History   Problem Relation Age of Onset    Heart block Family     Coronary artery disease Mother     Thrombosis Mother     Hypertension Mother     Arthritis Mother     Hyperlipidemia Mother     Diabetes Father     Hypertension Father     Arthritis Father     Sudden death Father         cardiac    Diabetes type II Father     Colon cancer Paternal Grandfather     Cancer Paternal Grandfather     Breast cancer Sister     Heart disease Brother         Coronary stents      Social History     Tobacco Use    Smoking status: Former     Current packs/day: 0.00     Average packs/day: 1 pack/day for 42.0 years (42.0 ttl pk-yrs)     Types: Cigarettes     Start date:      Quit date: 2009     Years since quittin.1    Smokeless tobacco: Never   Vaping Use    Vaping status: Never Used   Substance Use Topics    Alcohol use: Yes     Alcohol/week: 2.0 standard drinks of alcohol     Types: 2 Cans of beer per week     Comment: very rare \"beer here and there\"    Drug use: No      E-Cigarette/Vaping    E-Cigarette Use Never User       E-Cigarette/Vaping Substances    Nicotine No     THC No     CBD No     Flavoring No     Other No     Unknown No       I have reviewed and agree with the history as documented.     The patient is a 79-year-old male with PMH of HTN, HLD, CAD, CHF, T2DM, and GERD presenting for evaluation of abdominal pain with N/V.  The patient started 10 days ago diarrhea.  He saw his primary care doctor 2 days ago given his 8-10 episodes of diarrhea daily and the persistence of his diarrhea.  He had blood work and CT imaging done.  Labs were overall unremarkable and CT imaging was with finding of ileitis.  Stool studies were completed and positive for Salmonella.  He does have a history of malignant tumor of the cecum for which she does receive chemo infusions, most recently 1 month ago.  He presents this evening and since starting the ciprofloxacin he has had new " nausea and vomiting.  He also endorses epigastric abdominal pain that is intermittent, lasting minutes, described as a severe aching pain.  His diarrhea has since slowed/almost completely stopped in the last day.  He denies fevers, chills, chest pain, shortness of breath, palpitations, flank pain, urinary complaints, and skin changes.  He denies blood in vomitus and stool, no sick contacts, no raw fish or undercooked meats, no drinking unfiltered water.      History provided by:  Patient   used: No    Vomiting  Associated symptoms: abdominal pain    Associated symptoms: no diarrhea        Review of Systems   Gastrointestinal:  Positive for abdominal pain, nausea and vomiting. Negative for constipation and diarrhea.   All other systems reviewed and are negative.          Objective       ED Triage Vitals [02/14/25 2034]   Temperature Pulse Blood Pressure Respirations SpO2 Patient Position - Orthostatic VS   98.3 °F (36.8 °C) 97 147/68 16 95 % Sitting      Temp Source Heart Rate Source BP Location FiO2 (%) Pain Score    Oral Monitor Right arm -- 8      Vitals      Date and Time Temp Pulse SpO2 Resp BP Pain Score FACES Pain Rating User   02/14/25 2034 98.3 °F (36.8 °C) 97 95 % 16 147/68 8 -- MS            Physical Exam  Vitals and nursing note reviewed.   Constitutional:       General: He is not in acute distress.     Appearance: Normal appearance. He is well-developed and underweight. He is ill-appearing (Appears fatigued and mildly ill). He is not toxic-appearing or diaphoretic.   HENT:      Head: Normocephalic and atraumatic.      Jaw: There is normal jaw occlusion.      Nose: Nose normal.      Mouth/Throat:      Lips: Pink.      Mouth: Mucous membranes are dry.   Eyes:      Extraocular Movements: Extraocular movements intact.      Conjunctiva/sclera: Conjunctivae normal.   Cardiovascular:      Rate and Rhythm: Normal rate and regular rhythm.      Pulses:           Radial pulses are 2+ on the  right side and 2+ on the left side.        Dorsalis pedis pulses are 2+ on the right side and 2+ on the left side.      Heart sounds: Normal heart sounds, S1 normal and S2 normal.   Pulmonary:      Effort: Pulmonary effort is normal. No respiratory distress.      Breath sounds: Normal breath sounds and air entry.   Abdominal:      General: There is no distension.      Palpations: Abdomen is soft.      Tenderness: There is abdominal tenderness in the epigastric area and periumbilical area. There is no guarding or rebound.   Musculoskeletal:         General: Normal range of motion.      Cervical back: Neck supple.      Right lower leg: No edema.      Left lower leg: No edema.   Skin:     General: Skin is warm and dry.      Capillary Refill: Capillary refill takes 2 to 3 seconds.      Coloration: Skin is pale.      Findings: No rash.   Neurological:      General: No focal deficit present.      Mental Status: He is alert and oriented to person, place, and time. Mental status is at baseline.         Results Reviewed       Procedure Component Value Units Date/Time    HS Troponin I 2hr [[id number]]  (Normal) Collected: 02/14/25 2302    Lab Status: Final result Specimen: Blood from Arm, Right Updated: 02/14/25 2330     hs TnI 2hr 36 ng/L      Delta 2hr hsTnI 2 ng/L     RBC Morphology Reflex Test [[id number]] Collected: 02/14/25 2119    Lab Status: Final result Specimen: Blood from Arm, Right Updated: 02/14/25 2301    CBC and differential [[id number]]  (Abnormal) Collected: 02/14/25 2119    Lab Status: Final result Specimen: Blood from Arm, Right Updated: 02/14/25 2253     WBC 10.62 Thousand/uL      RBC 5.27 Million/uL      Hemoglobin 14.2 g/dL      Hematocrit 43.6 %      MCV 83 fL      MCH 26.9 pg      MCHC 32.6 g/dL      RDW 15.7 %      MPV 10.1 fL      Platelets 188 Thousands/uL     Narrative:      This is an appended report.  These results have been appended to a previously verified report.    Manual Differential(PHLEBS Do  Not Order) [[id number]]  (Abnormal) Collected: 02/14/25 2119    Lab Status: Final result Specimen: Blood from Arm, Right Updated: 02/14/25 2253     Segmented % 85 %      Lymphocytes % 7 %      Monocytes % 7 %      Eosinophils % 0 %      Basophils % 0 %      Atypical Lymphocytes % 1 %      Absolute Neutrophils 9.03 Thousand/uL      Absolute Lymphocytes 0.85 Thousand/uL      Absolute Monocytes 0.74 Thousand/uL      Absolute Eosinophils 0.00 Thousand/uL      Absolute Basophils 0.00 Thousand/uL      Total Counted --     RBC Morphology Present     Platelet Estimate Adequate     Anisocytosis Present     Ovalocytes Present    Protime-INR [[id number]]  (Abnormal) Collected: 02/14/25 2119    Lab Status: Final result Specimen: Blood from Arm, Right Updated: 02/14/25 2214     Protime 16.6 seconds      INR 1.29    Narrative:      INR Therapeutic Range    Indication                                             INR Range      Atrial Fibrillation                                               2.0-3.0  Hypercoagulable State                                    2.0.2.3  Left Ventricular Asist Device                            2.0-3.0  Mechanical Heart Valve                                  -    Aortic(with afib, MI, embolism, HF, LA enlargement,    and/or coagulopathy)                                     2.0-3.0 (2.5-3.5)     Mitral                                                             2.5-3.5  Prosthetic/Bioprosthetic Heart Valve               2.0-3.0  Venous thromboembolism (VTE: VT, PE        2.0-3.0    APTT [[id number]]  (Abnormal) Collected: 02/14/25 2119    Lab Status: Final result Specimen: Blood from Arm, Right Updated: 02/14/25 2214     PTT 37 seconds     HS Troponin 0hr (reflex protocol) [[id number]]  (Normal) Collected: 02/14/25 2119    Lab Status: Final result Specimen: Blood from Arm, Right Updated: 02/14/25 2205     hs TnI 0hr 34 ng/L     Lactic acid, plasma (w/reflex if result > 2.0) [[id number]]  (Normal) Collected:  02/14/25 2119    Lab Status: Final result Specimen: Blood from Arm, Right Updated: 02/14/25 2150     LACTIC ACID 0.8 mmol/L     Narrative:      Result may be elevated if tourniquet was used during collection.    Lipase [[id number]]  (Normal) Collected: 02/14/25 2119    Lab Status: Final result Specimen: Blood from Arm, Right Updated: 02/14/25 2150     Lipase 19 u/L     Comprehensive metabolic panel [[id number]]  (Abnormal) Collected: 02/14/25 2119    Lab Status: Final result Specimen: Blood from Arm, Right Updated: 02/14/25 2150     Sodium 131 mmol/L      Potassium 3.6 mmol/L      Chloride 102 mmol/L      CO2 19 mmol/L      ANION GAP 10 mmol/L      BUN 20 mg/dL      Creatinine 1.23 mg/dL      Glucose 110 mg/dL      Calcium 8.2 mg/dL      AST 23 U/L      ALT 15 U/L      Alkaline Phosphatase 86 U/L      Total Protein 6.5 g/dL      Albumin 3.9 g/dL      Total Bilirubin 0.49 mg/dL      eGFR 55 ml/min/1.73sq m     Narrative:      [employer] guidelines for Chronic Kidney Disease (CKD):     Stage 1 with normal or high GFR (GFR > 90 mL/min/1.73 square meters)    Stage 2 Mild CKD (GFR = 60-89 mL/min/1.73 square meters)    Stage 3A Moderate CKD (GFR = 45-59 mL/min/1.73 square meters)    Stage 3B Moderate CKD (GFR = 30-44 mL/min/1.73 square meters)    Stage 4 Severe CKD (GFR = 15-29 mL/min/1.73 square meters)    Stage 5 End Stage CKD (GFR <15 mL/min/1.73 square meters)  Note: GFR calculation is accurate only with a steady state creatinine            CT abdomen pelvis with contrast   Final Interpretation by Luis Angel MD (02/14 2300)      No acute findings in the abdomen or pelvis. Stable chronic left basilar consolidation.         Workstation performed: [id number]             ECG 12 Lead Documentation Only    Date/Time: 2/14/2025 11:37 PM    Performed by: DUNG Jacobo  Authorized by: DUNG Jacobo    Indications / Diagnosis:  Epigastric abdominal pain  ECG reviewed by me, the ED Provider:  yes    Patient location:  ED  Previous ECG:     Previous ECG:  Compared to current    Comparison ECG info:  February 13, 2025    Similarity:  No change    Comparison to cardiac monitor: Yes    Interpretation:     Interpretation: non-specific    Rate:     ECG rate:  90    ECG rate assessment: normal    Rhythm:     Rhythm: sinus rhythm    Ectopy:     Ectopy: PAC    QRS:     QRS axis:  Left    QRS intervals:  Wide  Conduction:     Conduction: abnormal      Abnormal conduction: complete LBBB and 1st degree    ST segments:     ST segments:  Non-specific  T waves:     T waves: non-specific    Comments:      Sinus rhythm with first-degree AV block with PACs, left bundle branch block, nonspecific EKG      ED Medication and Procedure Management   Prior to Admission Medications   Prescriptions Last Dose Informant Patient Reported? Taking?   Coenzyme Q10 (CoQ-10) 100 MG capsule  Self Yes No   IMMUNE GLOBULIN, HUMAN, IV  Self Yes No   Sig: Inject 30,000 mg into a catheter in a vein   Jardiance 25 MG TABS  Self No No   Sig: TAKE 1 TABLET BY MOUTH EVERY DAY IN THE MORNING   aspirin 81 MG tablet  Self Yes No   Sig: Take 1 tablet (81 mg total) by mouth daily   atorvastatin (LIPITOR) 40 mg tablet   No No   Sig: TAKE 1 TABLET BY MOUTH EVERY DAY   cholecalciferol (VITAMIN D3) 1,000 units tablet  Self No No   Sig: Take 2 tablets (2,000 Units total) by mouth daily   ciprofloxacin (CIPRO) 500 mg tablet   No No   Sig: Take 1 tablet (500 mg total) by mouth 2 (two) times a day for 14 days   ferrous sulfate 324 (65 Fe) mg  Self No No   Sig: TAKE 1 TABLET BY MOUTH EVERY DAY WITH BREAKFAST   furosemide (LASIX) 20 mg tablet  Self No No   Sig: Take 1 tablet (20 mg total) by mouth daily   glucose blood test strip  Self Yes No   metFORMIN (GLUCOPHAGE-XR) 500 mg 24 hr tablet   No No   Sig: TAKE 3 TABLETS (1,500 MG TOTAL) BY MOUTH DAILY WITH BREAKFAST   metoprolol succinate (TOPROL-XL) 100 mg 24 hr tablet  Self No No   Sig: TAKE 1 TABLET BY MOUTH  EVERY DAY   montelukast (SINGULAIR) 10 mg tablet  Self No No   Sig: TAKE 1 TABLET BY MOUTH EVERY DAY   multivitamin-minerals (CENTRUM ADULTS) tablet  Self No No   Sig: Take 1 tablet by mouth daily   omeprazole (PriLOSEC) 40 MG capsule  Self No No   Sig: TAKE 1 CAPSULE (40 MG TOTAL) BY MOUTH DAILY.   valACYclovir (VALTREX) 500 mg tablet  Self Yes No   Sig: Take 500 mg by mouth daily      Facility-Administered Medications: None     Patient's Medications   Discharge Prescriptions    AMOXICILLIN (AMOXIL) 500 MG CAPSULE    Take 1 capsule (500 mg total) by mouth 3 (three) times a day for 7 days       Start Date: 2/14/2025 End Date: 2/21/2025       Order Dose: 500 mg       Quantity: 21 capsule    Refills: 0    DICYCLOMINE (BENTYL) 20 MG TABLET    Take 1 tablet (20 mg total) by mouth 4 (four) times a day as needed (Abdominal cramping or pain) for up to 5 days       Start Date: 2/14/2025 End Date: 2/19/2025       Order Dose: 20 mg       Quantity: 20 tablet    Refills: 0    ONDANSETRON (ZOFRAN-ODT) 4 MG DISINTEGRATING TABLET    Take 1 tablet (4 mg total) by mouth every 6 (six) hours as needed for nausea or vomiting       Start Date: 2/14/2025 End Date: --       Order Dose: 4 mg       Quantity: 20 tablet    Refills: 0    SACCHAROMYCES BOULARDII (FLORASTOR) 250 MG CAPSULE    Take 1 capsule (250 mg total) by mouth 2 (two) times a day for 14 days       Start Date: 2/14/2025 End Date: 2/28/2025       Order Dose: 250 mg       Quantity: 28 capsule    Refills: 0     No discharge procedures on file.  ED SEPSIS DOCUMENTATION   Time reflects when diagnosis was documented in both MDM as applicable and the Disposition within this note       Time User Action Codes Description Comment    2/14/2025 11:39 PM Dot Shirley [R10.13] Epigastric abdominal pain     2/14/2025 11:39 PM Dot Shirley [R11.2] Nausea and vomiting     2/14/2025 11:40 PM Dot Shirley [A02.9] Salmonella                  DUNG Jacobo  02/14/25 8550

## 2025-02-17 ENCOUNTER — VBI (OUTPATIENT)
Dept: FAMILY MEDICINE CLINIC | Facility: HOSPITAL | Age: 80
End: 2025-02-17

## 2025-02-17 NOTE — TELEPHONE ENCOUNTER
02/17/25 1:52 PM    Patient contacted post ED visit, [facility] department spoke with patient/caregiver and outreach was successful.    Thank you.  Debra Alston MA  [facility]

## 2025-02-17 NOTE — TELEPHONE ENCOUNTER
02/17/25 12:02 PM    Patient contacted post ED visit, [facility] department spoke with patient/caregiver and outreach was successful.    Thank you.  Cristiana Cosby MA  [facility]

## 2025-02-21 LAB
ATRIAL RATE: 90 BPM
CULTURE ID FROM ENTERIC PCR: ABNORMAL
PR INTERVAL: 222 MS
QRS AXIS: -71 DEGREES
QRSD INTERVAL: 146 MS
QT INTERVAL: 450 MS
QTC INTERVAL: 550 MS
T WAVE AXIS: 66 DEGREES
VENTRICULAR RATE: 90 BPM

## 2025-02-21 PROCEDURE — 93010 ELECTROCARDIOGRAM REPORT: CPT | Performed by: INTERNAL MEDICINE

## 2025-03-07 LAB
LEFT EYE DIABETIC RETINOPATHY: NORMAL
RIGHT EYE DIABETIC RETINOPATHY: NORMAL

## 2025-03-08 LAB
ALBUMIN SERPL-MCNC: 4 G/DL (ref 3.8–4.8)
ALBUMIN/CREAT UR: 38 MG/G CREAT (ref 0–29)
ALP SERPL-CCNC: 121 IU/L (ref 44–121)
ALT SERPL-CCNC: 10 IU/L (ref 0–44)
AST SERPL-CCNC: 13 IU/L (ref 0–40)
BILIRUB SERPL-MCNC: 0.8 MG/DL (ref 0–1.2)
BUN SERPL-MCNC: 15 MG/DL (ref 8–27)
BUN/CREAT SERPL: 16 (ref 10–24)
CALCIUM SERPL-MCNC: 9.3 MG/DL (ref 8.6–10.2)
CHLORIDE SERPL-SCNC: 100 MMOL/L (ref 96–106)
CHOLEST SERPL-MCNC: 91 MG/DL (ref 100–199)
CHOLEST/HDLC SERPL: 3 RATIO (ref 0–5)
CO2 SERPL-SCNC: 26 MMOL/L (ref 20–29)
CREAT SERPL-MCNC: 0.93 MG/DL (ref 0.76–1.27)
CREAT UR-MCNC: 90.7 MG/DL
EGFR: 84 ML/MIN/1.73
EST. AVERAGE GLUCOSE BLD GHB EST-MCNC: 131 MG/DL
GLOBULIN SER-MCNC: 2.2 G/DL (ref 1.5–4.5)
GLUCOSE SERPL-MCNC: 101 MG/DL (ref 70–99)
HBA1C MFR BLD: 6.2 % (ref 4.8–5.6)
HDLC SERPL-MCNC: 30 MG/DL
LDLC SERPL CALC-MCNC: 43 MG/DL (ref 0–99)
MICROALBUMIN UR-MCNC: 34.5 UG/ML
POTASSIUM SERPL-SCNC: 3.5 MMOL/L (ref 3.5–5.2)
PROT SERPL-MCNC: 6.2 G/DL (ref 6–8.5)
SL AMB VLDL CHOLESTEROL CALC: 18 MG/DL (ref 5–40)
SODIUM SERPL-SCNC: 143 MMOL/L (ref 134–144)
TRIGL SERPL-MCNC: 91 MG/DL (ref 0–149)

## 2025-03-09 ENCOUNTER — RESULTS FOLLOW-UP (OUTPATIENT)
Dept: ENDOCRINOLOGY | Facility: HOSPITAL | Age: 80
End: 2025-03-09

## 2025-03-13 ENCOUNTER — APPOINTMENT (EMERGENCY)
Dept: RADIOLOGY | Facility: HOSPITAL | Age: 80
End: 2025-03-13
Payer: COMMERCIAL

## 2025-03-13 ENCOUNTER — APPOINTMENT (EMERGENCY)
Dept: CT IMAGING | Facility: HOSPITAL | Age: 80
End: 2025-03-13
Payer: COMMERCIAL

## 2025-03-13 ENCOUNTER — HOSPITAL ENCOUNTER (EMERGENCY)
Facility: HOSPITAL | Age: 80
Discharge: HOME | End: 2025-03-13
Attending: EMERGENCY MEDICINE
Payer: COMMERCIAL

## 2025-03-13 VITALS
TEMPERATURE: 98.4 F | SYSTOLIC BLOOD PRESSURE: 129 MMHG | WEIGHT: 145 LBS | BODY MASS INDEX: 20.76 KG/M2 | DIASTOLIC BLOOD PRESSURE: 62 MMHG | OXYGEN SATURATION: 94 % | HEIGHT: 70 IN | HEART RATE: 73 BPM | RESPIRATION RATE: 13 BRPM

## 2025-03-13 DIAGNOSIS — J90 PLEURAL EFFUSION: ICD-10-CM

## 2025-03-13 DIAGNOSIS — R91.8 PULMONARY NODULES: ICD-10-CM

## 2025-03-13 DIAGNOSIS — R06.02 SOB (SHORTNESS OF BREATH): Primary | ICD-10-CM

## 2025-03-13 DIAGNOSIS — J18.9 PNEUMONIA: ICD-10-CM

## 2025-03-13 LAB
2HR DELTA HS TROPONIN: -1 NG/L
ALBUMIN SERPL BCG-MCNC: 3.7 G/DL (ref 3.5–5)
ALP SERPL-CCNC: 93 U/L (ref 34–104)
ALT SERPL W P-5'-P-CCNC: 9 U/L (ref 7–52)
ANION GAP SERPL CALCULATED.3IONS-SCNC: 10 MMOL/L (ref 4–13)
APTT PPP: 36 SECONDS (ref 23–34)
AST SERPL W P-5'-P-CCNC: 13 U/L (ref 13–39)
ATRIAL RATE: 79 BPM
BASOPHILS # BLD AUTO: 0.08 THOUSANDS/ÂΜL (ref 0–0.1)
BASOPHILS NFR BLD AUTO: 1 % (ref 0–1)
BILIRUB SERPL-MCNC: 0.77 MG/DL (ref 0.2–1)
BNP SERPL-MCNC: 435 PG/ML (ref 0–100)
BUN SERPL-MCNC: 11 MG/DL (ref 5–25)
CALCIUM SERPL-MCNC: 8.8 MG/DL (ref 8.4–10.2)
CARDIAC TROPONIN I PNL SERPL HS: 17 NG/L (ref ?–50)
CARDIAC TROPONIN I PNL SERPL HS: 18 NG/L (ref ?–50)
CHLORIDE SERPL-SCNC: 100 MMOL/L (ref 96–108)
CO2 SERPL-SCNC: 28 MMOL/L (ref 21–32)
CREAT SERPL-MCNC: 0.79 MG/DL (ref 0.6–1.3)
D DIMER PPP FEU-MCNC: 1.3 UG/ML FEU
EOSINOPHIL # BLD AUTO: 0.17 THOUSAND/ÂΜL (ref 0–0.61)
EOSINOPHIL NFR BLD AUTO: 2 % (ref 0–6)
ERYTHROCYTE [DISTWIDTH] IN BLOOD BY AUTOMATED COUNT: 15.7 % (ref 11.6–15.1)
FLUAV RNA RESP QL NAA+PROBE: NEGATIVE
FLUBV RNA RESP QL NAA+PROBE: NEGATIVE
GFR SERPL CREATININE-BSD FRML MDRD: 86 ML/MIN/1.73SQ M
GLUCOSE SERPL-MCNC: 128 MG/DL (ref 65–140)
HCT VFR BLD AUTO: 40.3 % (ref 36.5–49.3)
HGB BLD-MCNC: 13 G/DL (ref 12–17)
IMM GRANULOCYTES # BLD AUTO: 0.06 THOUSAND/UL (ref 0–0.2)
IMM GRANULOCYTES NFR BLD AUTO: 1 % (ref 0–2)
INR PPP: 1.09 (ref 0.85–1.19)
LYMPHOCYTES # BLD AUTO: 1.35 THOUSANDS/ÂΜL (ref 0.6–4.47)
LYMPHOCYTES NFR BLD AUTO: 14 % (ref 14–44)
MCH RBC QN AUTO: 26.6 PG (ref 26.8–34.3)
MCHC RBC AUTO-ENTMCNC: 32.3 G/DL (ref 31.4–37.4)
MCV RBC AUTO: 83 FL (ref 82–98)
MONOCYTES # BLD AUTO: 0.92 THOUSAND/ÂΜL (ref 0.17–1.22)
MONOCYTES NFR BLD AUTO: 10 % (ref 4–12)
NEUTROPHILS # BLD AUTO: 6.78 THOUSANDS/ÂΜL (ref 1.85–7.62)
NEUTS SEG NFR BLD AUTO: 72 % (ref 43–75)
NRBC BLD AUTO-RTO: 0 /100 WBCS
PLATELET # BLD AUTO: 226 THOUSANDS/UL (ref 149–390)
PMV BLD AUTO: 9.1 FL (ref 8.9–12.7)
POTASSIUM SERPL-SCNC: 3.2 MMOL/L (ref 3.5–5.3)
PROT SERPL-MCNC: 6.3 G/DL (ref 6.4–8.4)
PROTHROMBIN TIME: 14.6 SECONDS (ref 12.3–15)
QRS AXIS: -18 DEGREES
QRSD INTERVAL: 140 MS
QT INTERVAL: 444 MS
QTC INTERVAL: 506 MS
RBC # BLD AUTO: 4.88 MILLION/UL (ref 3.88–5.62)
RSV RNA RESP QL NAA+PROBE: NEGATIVE
SARS-COV-2 RNA RESP QL NAA+PROBE: NEGATIVE
SODIUM SERPL-SCNC: 138 MMOL/L (ref 135–147)
T WAVE AXIS: 78 DEGREES
VENTRICULAR RATE: 78 BPM
WBC # BLD AUTO: 9.36 THOUSAND/UL (ref 4.31–10.16)

## 2025-03-13 PROCEDURE — 85610 PROTHROMBIN TIME: CPT | Performed by: PHYSICIAN ASSISTANT

## 2025-03-13 PROCEDURE — 85025 COMPLETE CBC W/AUTO DIFF WBC: CPT | Performed by: PHYSICIAN ASSISTANT

## 2025-03-13 PROCEDURE — 85730 THROMBOPLASTIN TIME PARTIAL: CPT | Performed by: PHYSICIAN ASSISTANT

## 2025-03-13 PROCEDURE — 93005 ELECTROCARDIOGRAM TRACING: CPT

## 2025-03-13 PROCEDURE — 93010 ELECTROCARDIOGRAM REPORT: CPT | Performed by: INTERNAL MEDICINE

## 2025-03-13 PROCEDURE — 99285 EMERGENCY DEPT VISIT HI MDM: CPT | Performed by: PHYSICIAN ASSISTANT

## 2025-03-13 PROCEDURE — 99285 EMERGENCY DEPT VISIT HI MDM: CPT

## 2025-03-13 PROCEDURE — 0241U HB NFCT DS VIR RESP RNA 4 TRGT: CPT | Performed by: PHYSICIAN ASSISTANT

## 2025-03-13 PROCEDURE — 83880 ASSAY OF NATRIURETIC PEPTIDE: CPT | Performed by: PHYSICIAN ASSISTANT

## 2025-03-13 PROCEDURE — 80053 COMPREHEN METABOLIC PANEL: CPT | Performed by: PHYSICIAN ASSISTANT

## 2025-03-13 PROCEDURE — 84484 ASSAY OF TROPONIN QUANT: CPT | Performed by: PHYSICIAN ASSISTANT

## 2025-03-13 PROCEDURE — 71045 X-RAY EXAM CHEST 1 VIEW: CPT

## 2025-03-13 PROCEDURE — 36415 COLL VENOUS BLD VENIPUNCTURE: CPT | Performed by: PHYSICIAN ASSISTANT

## 2025-03-13 PROCEDURE — 85379 FIBRIN DEGRADATION QUANT: CPT | Performed by: PHYSICIAN ASSISTANT

## 2025-03-13 PROCEDURE — 71275 CT ANGIOGRAPHY CHEST: CPT

## 2025-03-13 RX ORDER — POTASSIUM CHLORIDE 1500 MG/1
40 TABLET, EXTENDED RELEASE ORAL ONCE
Status: COMPLETED | OUTPATIENT
Start: 2025-03-13 | End: 2025-03-13

## 2025-03-13 RX ORDER — AZITHROMYCIN 250 MG/1
TABLET, FILM COATED ORAL
Qty: 6 TABLET | Refills: 0 | Status: SHIPPED | OUTPATIENT
Start: 2025-03-13 | End: 2025-03-17

## 2025-03-13 RX ADMIN — IOHEXOL 85 ML: 350 INJECTION, SOLUTION INTRAVENOUS at 08:32

## 2025-03-13 RX ADMIN — POTASSIUM CHLORIDE 40 MEQ: 1500 TABLET, EXTENDED RELEASE ORAL at 08:21

## 2025-03-13 NOTE — ED NOTES
Pt ambulated with a steady gait. Ambulatory pulse oximetry recorded SpO2 at 94% at the lowest with a HR of 73 bpm.      Lulu Mcnulty  03/13/25 0946

## 2025-03-13 NOTE — ED PROVIDER NOTES
Time reflects when diagnosis was documented in both MDM as applicable and the Disposition within this note       Time User Action Codes Description Comment    3/13/2025 10:32 AM Sarah Wong Add [R06.02] SOB (shortness of breath)     3/13/2025 10:32 AM Sarah Wong Add [J18.9] Pneumonia     3/13/2025 10:32 AM Sarah Wong Add [J90] Pleural effusion     3/13/2025 10:33 AM Sarah Wong Add [R91.8] Pulmonary nodules           ED Disposition       ED Disposition   Discharge    Condition   Stable    Date/Time   Thu Mar 13, 2025 10:32 AM    Comment   Gabe CYNTHIA Pickard discharge to home/self care.                   Assessment & Plan       Medical Decision Making  Patient with SOB, will order labs, EKG, CXR to r/o chf exacerbation, pneumonia.  Patient with elevated ddimer, will order CT chest to r/o PE.   Patient with small pleural effusions and pneumonia on CXR, pulse ox at rest and ambulatory greater than 90%, will d/c on abx and instructed patient to continue lasix.  Return precautions given.     Amount and/or Complexity of Data Reviewed  External Data Reviewed: labs and ECG.  Labs: ordered.  Radiology: ordered.  ECG/medicine tests: ordered and independent interpretation performed.    Risk  Prescription drug management.        ED Course as of 03/13/25 1217   Thu Mar 13, 2025   0935 Ambulatory pulse ox 94% on RA.        Medications   potassium chloride (Klor-Con M20) CR tablet 40 mEq (40 mEq Oral Given 3/13/25 0821)   iohexol (OMNIPAQUE) 350 MG/ML injection (MULTI-DOSE) 85 mL (85 mL Intravenous Given 3/13/25 0832)       ED Risk Strat Scores   HEART Risk Score      Flowsheet Row Most Recent Value   Heart Score Risk Calculator    History 0 Filed at: 03/13/2025 0755   ECG 1 Filed at: 03/13/2025 0755   Age 2 Filed at: 03/13/2025 0755   Risk Factors 1 Filed at: 03/13/2025 0755   Troponin 1 Filed at: 03/13/2025 0755   HEART Score 5 Filed at: 03/13/2025 0755          HEART Risk Score      Flowsheet Row  Most Recent Value   Heart Score Risk Calculator    History 0 Filed at: 03/13/2025 0755   ECG 1 Filed at: 03/13/2025 0755   Age 2 Filed at: 03/13/2025 0755   Risk Factors 1 Filed at: 03/13/2025 0755   Troponin 1 Filed at: 03/13/2025 0755   HEART Score 5 Filed at: 03/13/2025 0755                              SBIRT 20yo+      Flowsheet Row Most Recent Value   Initial Alcohol Screen: US AUDIT-C     1. How often do you have a drink containing alcohol? 3 Filed at: 03/13/2025 0712   2. How many drinks containing alcohol do you have on a typical day you are drinking?  0 Filed at: 03/13/2025 0712   3b. FEMALE Any Age, or MALE 65+: How often do you have 4 or more drinks on one occassion? 0 Filed at: 03/13/2025 0712   Audit-C Score 3 Filed at: 03/13/2025 0712   BETO: How many times in the past year have you...    Used an illegal drug or used a prescription medication for non-medical reasons? Never Filed at: 03/13/2025 0712            Wells' Criteria for PE      Flowsheet Row Most Recent Value   Wells' Criteria for PE    Clinical signs and symptoms of DVT 0 Filed at: 03/13/2025 0755   PE is primary diagnosis or equally likely 0 Filed at: 03/13/2025 0755   HR >100 0 Filed at: 03/13/2025 0755   Immobilization at least 3 days or Surgery in the previous 4 weeks 0 Filed at: 03/13/2025 0755   Previous, objectively diagnosed PE or DVT 0 Filed at: 03/13/2025 0755   Hemoptysis 0 Filed at: 03/13/2025 0755   Malignancy with treatment within 6 months or palliative 1 Filed at: 03/13/2025 0755   Wells' Criteria Total 1 Filed at: 03/13/2025 0755                        History of Present Illness       Chief Complaint   Patient presents with    Shortness of Breath     Patient presents to the ER with concern of worsening CHF accompanied by SOB.       Past Medical History:   Diagnosis Date    Acute on chronic systolic (congestive) heart failure (HCC) 05/15/2024    Acute respiratory failure with hypoxia (HCC) 04/20/2022    Arthritis     Atrial  fibrillation (HCC)     Cancer (HCC)     Cataract     Colitis     Colon polyp     Coronary artery disease 2014    Diabetes mellitus (HCC) ?    type 2    Fatty liver     GERD (gastroesophageal reflux disease)     History of chemotherapy     History of radiation therapy     History of shingles 2018    History of transfusion     Hyperlipidemia     Hypertension     Pneumonia 08/11/22    Sepsis without acute organ dysfunction (HCC) 02/04/2023    Shingles 2017      Past Surgical History:   Procedure Laterality Date    AORTIC VALVE REPLACEMENT      CARDIAC VALVE REPLACEMENT  2014    aorta    CATARACT EXTRACTION Bilateral     COLECTOMY      COLONOSCOPY  11/2019    Prior right hemicolectomy    CORONARY ARTERY BYPASS GRAFT      DENTAL SURGERY      JOINT REPLACEMENT  January 2019    left knee    KNEE SURGERY      LYMPH NODE BIOPSY  2003    LYMPHADENECTOMY      OTHER SURGICAL HISTORY      MAZE PROCEDURE    UT ARTHRP KNE CONDYLE&PLATU MEDIAL&LAT COMPARTMENTS Left 01/28/2019    Procedure: ARTHROPLASTY LEFT KNEE TOTAL;  Surgeon: Darell Delgado MD;  Location: [facility];  Service: Orthopedics    UT LARYNGOSCOPY DIRECT OPERATIVE W/BIOPSY Right 04/27/2022    Procedure: DIRECT LARYNGOSCOPY WITH BIOPSY RIGHT PHARYNX, POSSIBLE RIGHT NECK LYMPH NODE BIOPSY; FROZEN SECTION;  Surgeon: Kevin Hardin MD;  Location:  MAIN OR;  Service: ENT    SKIN BIOPSY      UPPER GASTROINTESTINAL ENDOSCOPY  11/2019    Schatzki ring    US GUIDED LYMPH NODE BIOPSY RIGHT  12/14/2021      Family History   Problem Relation Age of Onset    Heart block Family     Coronary artery disease Mother     Thrombosis Mother     Hypertension Mother     Arthritis Mother     Hyperlipidemia Mother     Diabetes Father     Hypertension Father     Arthritis Father     Sudden death Father         cardiac    Diabetes type II Father     Colon cancer Paternal Grandfather     Cancer Paternal Grandfather     Breast cancer Sister     Heart disease Brother         Coronary stents     "  Social History     Tobacco Use    Smoking status: Former     Current packs/day: 0.00     Average packs/day: 1 pack/day for 42.0 years (42.0 ttl pk-yrs)     Types: Cigarettes     Start date:      Quit date: 2009     Years since quittin.2    Smokeless tobacco: Never   Vaping Use    Vaping status: Never Used   Substance Use Topics    Alcohol use: Yes     Alcohol/week: 2.0 standard drinks of alcohol     Types: 2 Cans of beer per week     Comment: very rare \"beer here and there\"    Drug use: No      E-Cigarette/Vaping    E-Cigarette Use Never User       E-Cigarette/Vaping Substances    Nicotine No     THC No     CBD No     Flavoring No     Other No     Unknown No       I have reviewed and agree with the history as documented.     Patient is a 80 y/o M with h/o CHF, multiple myeloma that presents to the ED with SOB for over 1 week only with exertion. He denies chest pain.  He is on lasix 20mg daily and has been compliant with his meds.  NO weight gain. No cough.  He has rhinorrhea.        History provided by:  Patient  Shortness of Breath  Associated symptoms: no abdominal pain, no chest pain, no cough, no fever, no neck pain, no rash and no vomiting        Review of Systems   Constitutional:  Negative for chills and fever.   HENT:  Positive for rhinorrhea.    Respiratory:  Positive for shortness of breath. Negative for cough.    Cardiovascular:  Negative for chest pain, palpitations and leg swelling.   Gastrointestinal:  Negative for abdominal pain, diarrhea, nausea and vomiting.   Genitourinary:  Negative for dysuria.   Musculoskeletal:  Negative for back pain and neck pain.   Skin:  Negative for color change, pallor and rash.   Neurological:  Negative for dizziness, weakness, light-headedness and numbness.   Psychiatric/Behavioral:  Negative for confusion.    All other systems reviewed and are negative.          Objective       ED Triage Vitals [25 0710]   Temperature Pulse Blood Pressure " Respirations SpO2 Patient Position - Orthostatic VS   98.4 °F (36.9 °C) 82 141/65 19 93 % Lying      Temp Source Heart Rate Source BP Location FiO2 (%) Pain Score    Oral Monitor Right arm -- No Pain      Vitals      Date and Time Temp Pulse SpO2 Resp BP Pain Score FACES Pain Rating User   03/13/25 0921 -- 73 94 % -- -- -- -- AL   03/13/25 0900 -- 67 94 % 13 129/62 -- -- CC   03/13/25 0710 98.4 °F (36.9 °C) 82 93 % 19 141/65 No Pain -- CAC            Physical Exam  Vitals and nursing note reviewed.   Constitutional:       General: He is not in acute distress.     Appearance: Normal appearance. He is well-developed and well-groomed. He is not ill-appearing or diaphoretic.   HENT:      Head: Normocephalic and atraumatic.      Right Ear: External ear normal.      Left Ear: External ear normal.      Nose: Nose normal.   Eyes:      Conjunctiva/sclera: Conjunctivae normal.   Cardiovascular:      Rate and Rhythm: Normal rate and regular rhythm.      Heart sounds: Normal heart sounds.   Pulmonary:      Effort: Pulmonary effort is normal.      Breath sounds: Normal breath sounds. No wheezing, rhonchi or rales.   Musculoskeletal:         General: Normal range of motion.      Cervical back: Normal range of motion.      Right lower leg: No edema.      Left lower leg: No edema.   Skin:     General: Skin is warm and dry.      Coloration: Skin is not jaundiced or pale.      Findings: No rash.   Neurological:      General: No focal deficit present.      Mental Status: He is alert and oriented to person, place, and time.      Motor: No weakness.   Psychiatric:         Behavior: Behavior is cooperative.         Results Reviewed       Procedure Component Value Units Date/Time    HS Troponin I 2hr [[id number]]  (Normal) Collected: 03/13/25 0938    Lab Status: Final result Specimen: Blood from Arm, Right Updated: 03/13/25 1010     hs TnI 2hr 17 ng/L      Delta 2hr hsTnI -1 ng/L     FLU/RSV/COVID - if FLU/RSV clinically relevant (2hr TAT)  [[id number]]  (Normal) Collected: 03/13/25 0724    Lab Status: Final result Specimen: Nares from Nose Updated: 03/13/25 0808     SARS-CoV-2 Negative     INFLUENZA A PCR Negative     INFLUENZA B PCR Negative     RSV PCR Negative    Narrative:      This test has been performed using the CoV-2/Flu/RSV plus assay on the [employer] GeneXpert platform. This test has been validated by the  and verified by the performing laboratory.     This test is designed to amplify and detect the following: nucleocapsid (N), envelope (E), and RNA-dependent RNA polymerase (RdRP) genes of the SARS-CoV-2 genome; matrix (M), basic polymerase (PB2), and acidic protein (PA) segments of the influenza A genome; matrix (M) and non-structural protein (NS) segments of the influenza B genome, and the nucleocapsid genes of RSV A and RSV B.     Positive results are indicative of the presence of Flu A, Flu B, RSV, and/or SARS-CoV-2 RNA. Positive results for SARS-CoV-2 or suspected novel influenza should be reported to state, local, or federal health departments according to local reporting requirements.      All results should be assessed in conjunction with clinical presentation and other laboratory markers for clinical management.     FOR PEDIATRIC PATIENTS - copy/paste COVID Guidelines URL to browser: [email or web address]       HS Troponin I 4hr [[id number]]     Lab Status: No result Specimen: Blood     B-Type Natriuretic Peptide(BNP) [[id number]]  (Abnormal) Collected: 03/13/25 0724    Lab Status: Final result Specimen: Blood from Arm, Right Updated: 03/13/25 0758      pg/mL     HS Troponin 0hr (reflex protocol) [[id number]]  (Normal) Collected: 03/13/25 0724    Lab Status: Final result Specimen: Blood from Arm, Right Updated: 03/13/25 0753     hs TnI 0hr 18 ng/L     D-Dimer [[id number]]  (Abnormal) Collected: 03/13/25 0724    Lab Status: Final result Specimen: Blood from Arm, Right  Updated: 03/13/25 0751     D-Dimer, Quant 1.30 ug/ml FEU     Narrative:      In the evaluation for possible pulmonary embolism, in the appropriate (Well's Score of 4 or less) patient, the age adjusted d-dimer cutoff for this patient can be calculated as:    Age x 0.01 (in ug/mL) for Age-adjusted D-dimer exclusion threshold for a patient over 50 years.    Protime-INR [[id number]]  (Normal) Collected: 03/13/25 0724    Lab Status: Final result Specimen: Blood from Arm, Right Updated: 03/13/25 0746     Protime 14.6 seconds      INR 1.09    Narrative:      INR Therapeutic Range    Indication                                             INR Range      Atrial Fibrillation                                               2.0-3.0  Hypercoagulable State                                    2.0.2.3  Left Ventricular Asist Device                            2.0-3.0  Mechanical Heart Valve                                  -    Aortic(with afib, MI, embolism, HF, LA enlargement,    and/or coagulopathy)                                     2.0-3.0 (2.5-3.5)     Mitral                                                             2.5-3.5  Prosthetic/Bioprosthetic Heart Valve               2.0-3.0  Venous thromboembolism (VTE: VT, PE        2.0-3.0    APTT [[id number]]  (Abnormal) Collected: 03/13/25 0724    Lab Status: Final result Specimen: Blood from Arm, Right Updated: 03/13/25 0746     PTT 36 seconds     Comprehensive metabolic panel [[id number]]  (Abnormal) Collected: 03/13/25 0724    Lab Status: Final result Specimen: Blood from Arm, Right Updated: 03/13/25 0746     Sodium 138 mmol/L      Potassium 3.2 mmol/L      Chloride 100 mmol/L      CO2 28 mmol/L      ANION GAP 10 mmol/L      BUN 11 mg/dL      Creatinine 0.79 mg/dL      Glucose 128 mg/dL      Calcium 8.8 mg/dL      AST 13 U/L      ALT 9 U/L      Alkaline Phosphatase 93 U/L      Total Protein 6.3 g/dL      Albumin 3.7 g/dL      Total Bilirubin 0.77 mg/dL      eGFR 86 ml/min/1.73sq m      Narrative:      National Kid[employer] Disease Foundation guidelines for Chronic Kidney Disease (CKD):     Stage 1 with normal or high GFR (GFR > 90 mL/min/1.73 square meters)    Stage 2 Mild CKD (GFR = 60-89 mL/min/1.73 square meters)    Stage 3A Moderate CKD (GFR = 45-59 mL/min/1.73 square meters)    Stage 3B Moderate CKD (GFR = 30-44 mL/min/1.73 square meters)    Stage 4 Severe CKD (GFR = 15-29 mL/min/1.73 square meters)    Stage 5 End Stage CKD (GFR <15 mL/min/1.73 square meters)  Note: GFR calculation is accurate only with a steady state creatinine    CBC and differential [[id number]]  (Abnormal) Collected: 03/13/25 0724    Lab Status: Final result Specimen: Blood from Arm, Right Updated: 03/13/25 0732     WBC 9.36 Thousand/uL      RBC 4.88 Million/uL      Hemoglobin 13.0 g/dL      Hematocrit 40.3 %      MCV 83 fL      MCH 26.6 pg      MCHC 32.3 g/dL      RDW 15.7 %      MPV 9.1 fL      Platelets 226 Thousands/uL      nRBC 0 /100 WBCs      Segmented % 72 %      Immature Grans % 1 %      Lymphocytes % 14 %      Monocytes % 10 %      Eosinophils Relative 2 %      Basophils Relative 1 %      Absolute Neutrophils 6.78 Thousands/µL      Absolute Immature Grans 0.06 Thousand/uL      Absolute Lymphocytes 1.35 Thousands/µL      Absolute Monocytes 0.92 Thousand/µL      Eosinophils Absolute 0.17 Thousand/µL      Basophils Absolute 0.08 Thousands/µL             CTA chest pe study   Final Interpretation by E. Alec Schoenberger, MD (03/13 0907)      No acute pulmonary embolism   Small pleural effusions   Increased left basilar consolidation suspicious for pneumonia superimposed on chronic changes. Recommend follow-up.   Subcentimeter pulmonary nodules are stable since 2023.         Workstation performed: [id number]         XR chest 1 view portable   Final Interpretation by Nikia White MD (03/13 1128)   Small left pleural effusion and adjacent parenchymal consolidation.            Workstation performed: [id number]              ECG 12 Lead Documentation Only    Date/Time: 3/13/2025 7:25 AM    Performed by: Sarah Wong PA-C  Authorized by: Sarah Wong PA-C    Indications / Diagnosis:  SOB  ECG reviewed by me, the ED Provider: yes    Patient location:  ED  Previous ECG:     Previous ECG:  Compared to current    Similarity:  No change  Rate:     ECG rate:  78  Rhythm:     Rhythm: sinus rhythm    Conduction:     Conduction: abnormal      Abnormal conduction: complete LBBB    T waves:     T waves: normal        ED Medication and Procedure Management   Prior to Admission Medications   Prescriptions Last Dose Informant Patient Reported? Taking?   Coenzyme Q10 (CoQ-10) 100 MG capsule  Self Yes No   IMMUNE GLOBULIN, HUMAN, IV  Self Yes No   Sig: Inject 30,000 mg into a catheter in a vein   Jardiance 25 MG TABS  Self No No   Sig: TAKE 1 TABLET BY MOUTH EVERY DAY IN THE MORNING   aspirin 81 MG tablet  Self Yes No   Sig: Take 1 tablet (81 mg total) by mouth daily   atorvastatin (LIPITOR) 40 mg tablet   No No   Sig: TAKE 1 TABLET BY MOUTH EVERY DAY   cholecalciferol (VITAMIN D3) 1,000 units tablet  Self No No   Sig: Take 2 tablets (2,000 Units total) by mouth daily   dicyclomine (BENTYL) 20 mg tablet   No No   Sig: Take 1 tablet (20 mg total) by mouth 4 (four) times a day as needed (Abdominal cramping or pain) for up to 5 days   ferrous sulfate 324 (65 Fe) mg  Self No No   Sig: TAKE 1 TABLET BY MOUTH EVERY DAY WITH BREAKFAST   furosemide (LASIX) 20 mg tablet  Self No No   Sig: Take 1 tablet (20 mg total) by mouth daily   glucose blood test strip  Self Yes No   metFORMIN (GLUCOPHAGE-XR) 500 mg 24 hr tablet   No No   Sig: TAKE 3 TABLETS (1,500 MG TOTAL) BY MOUTH DAILY WITH BREAKFAST   metoprolol succinate (TOPROL-XL) 100 mg 24 hr tablet   No No   Sig: TAKE 1 TABLET BY MOUTH EVERY DAY   montelukast (SINGULAIR) 10 mg tablet  Self No No   Sig: TAKE 1 TABLET BY MOUTH EVERY DAY   multivitamin-minerals (CENTRUM ADULTS) tablet   Self No No   Sig: Take 1 tablet by mouth daily   omeprazole (PriLOSEC) 40 MG capsule  Self No No   Sig: TAKE 1 CAPSULE (40 MG TOTAL) BY MOUTH DAILY.   ondansetron (ZOFRAN-ODT) 4 mg disintegrating tablet   No No   Sig: Take 1 tablet (4 mg total) by mouth every 6 (six) hours as needed for nausea or vomiting   valACYclovir (VALTREX) 500 mg tablet  Self Yes No   Sig: Take 500 mg by mouth daily      Facility-Administered Medications: None     Discharge Medication List as of 3/13/2025 10:35 AM        START taking these medications    Details   azithromycin (Zithromax Z-Travon) 250 mg tablet Take 2 tablets today then 1 tablet daily x 4 days, Normal           CONTINUE these medications which have NOT CHANGED    Details   aspirin 81 MG tablet Take 1 tablet (81 mg total) by mouth daily, Starting Fri 3/29/2019, Historical Med      atorvastatin (LIPITOR) 40 mg tablet TAKE 1 TABLET BY MOUTH EVERY DAY, Starting Mon 1/6/2025, Normal      cholecalciferol (VITAMIN D3) 1,000 units tablet Take 2 tablets (2,000 Units total) by mouth daily, Starting Sun 4/11/2021, Normal      Coenzyme Q10 (CoQ-10) 100 MG capsule Historical Med      dicyclomine (BENTYL) 20 mg tablet Take 1 tablet (20 mg total) by mouth 4 (four) times a day as needed (Abdominal cramping or pain) for up to 5 days, Starting Fri 2/14/2025, Until Wed 2/19/2025 at 2359, Normal      ferrous sulfate 324 (65 Fe) mg TAKE 1 TABLET BY MOUTH EVERY DAY WITH BREAKFAST, Normal      furosemide (LASIX) 20 mg tablet Take 1 tablet (20 mg total) by mouth daily, Starting Mon 11/25/2024, No Print      glucose blood test strip Historical Med      IMMUNE GLOBULIN, HUMAN, IV Inject 30,000 mg into a catheter in a vein, Historical Med      Jardiance 25 MG TABS TAKE 1 TABLET BY MOUTH EVERY DAY IN THE MORNING, Starting Mon 9/16/2024, Normal      metFORMIN (GLUCOPHAGE-XR) 500 mg 24 hr tablet TAKE 3 TABLETS (1,500 MG TOTAL) BY MOUTH DAILY WITH BREAKFAST, Normal      metoprolol succinate (TOPROL-XL) 100 mg  24 hr tablet TAKE 1 TABLET BY MOUTH EVERY DAY, Starting Sat 2/15/2025, Normal      montelukast (SINGULAIR) 10 mg tablet TAKE 1 TABLET BY MOUTH EVERY DAY, Starting Thu 10/24/2024, Normal      multivitamin-minerals (CENTRUM ADULTS) tablet Take 1 tablet by mouth daily, Starting Tue 4/13/2021, No Print      omeprazole (PriLOSEC) 40 MG capsule TAKE 1 CAPSULE (40 MG TOTAL) BY MOUTH DAILY., Starting Thu 11/21/2024, Normal      ondansetron (ZOFRAN-ODT) 4 mg disintegrating tablet Take 1 tablet (4 mg total) by mouth every 6 (six) hours as needed for nausea or vomiting, Starting Fri 2/14/2025, Normal      valACYclovir (VALTREX) 500 mg tablet Take 500 mg by mouth daily, Starting Fri 5/27/2022, Historical Med           No discharge procedures on file.  ED SEPSIS DOCUMENTATION   Time reflects when diagnosis was documented in both MDM as applicable and the Disposition within this note       Time User Action Codes Description Comment    3/13/2025 10:32 AM Sarah Wong [R06.02] SOB (shortness of breath)     3/13/2025 10:32 AM Sarah Wong [J18.9] Pneumonia     3/13/2025 10:32 AM Sarah Wong [J90] Pleural effusion     3/13/2025 10:33 AM Sarah Wong [R91.8] Pulmonary nodules                  Sarah Wong PA-C  03/13/25 1210

## 2025-03-13 NOTE — DISCHARGE INSTRUCTIONS
Take antibiotic as directed.  Follow up with PCP in 2-3 days for recheck.  Return to ER if symptoms worsen.  Continue lasix.

## 2025-03-14 ENCOUNTER — VBI (OUTPATIENT)
Dept: FAMILY MEDICINE CLINIC | Facility: HOSPITAL | Age: 80
End: 2025-03-14

## 2025-03-14 NOTE — TELEPHONE ENCOUNTER
03/14/25 9:51 AM    Patient contacted post ED visit, first outreach attempt made. Message was left for patient to return a call to the [facility] Department at [facility]: Phone [phone].    Thank you.  [facility]  [facility]

## 2025-03-16 NOTE — PROGRESS NOTES
Established Patient Progress Note       No chief complaint on file.     History of Present Illness:     Gabe Pickard is a 79 y.o. male with a history of T2DM since 5-6 years on metformin/jardiance only w/o any microvascular complications with CAD s/p CABG with recent dx of MM on chemotherapy on Dex every month. Last visit 08/24    Patient was recently admitted for PNA dc 4 days ago. Reports feeling ok. Does report eating more pie and dessert to gain weight, reports cannot swallow well and thus cannot eat protein/meat. Does drink ensure.     Current chemotherapy plan:- every month gets chemo  and gets decadron through it     Blood Sugar/Glucometer/Pump/CGM review: doesn't check it often  Current regime:-   Metformin 500mg daily, jardiance 25mg (had diarrhea on higher dose of metformin)    Medications tried/failed in past:- None  Hypoglycemic episodes: None  Hyperglycemia:-   denies any blurry vision, headaches. Polyuria d/t lasix use  Diet- see above  Activity- stays active  Weight- stable    last eye exam- Recently had exam 02/24, no retinopathy  last foot exam - Done 08/24  History of hypertension- Yes on toprol 100mg   History of hyperlipidemia- Yes on lipitor 40mg   Last lipid:- 03/25, LDL <100   Last urine microalbumina:- 3/25 38  Last HbA1C 3/25 6.2%, 8/24 5.8%. 02/24 5.9%  08/23 5.9%, 02/23:- 8.9%, 05/23 6.2%    Social hx:- Does not smoke, drink alcohol or use any other drugs   Family hX:- father and brother have t2DM    Patient Active Problem List   Diagnosis    Basal cell carcinoma of skin    Coronary artery disease involving native heart with angina pectoris, unspecified vessel or lesion type (HCC)    Type 2 diabetes mellitus without complication, without long-term current use of insulin (HCC)    Dyslipidemia    Erectile dysfunction    Fatty liver    Lymphoma (HCC)    Malignant tumor of cecum (HCC)    Multiple myeloma not having achieved remission (HCC)    Primary localized osteoarthritis of right  knee    Hx of CABG    Essential hypertension    S/P total knee arthroplasty, left    Chronic diastolic congestive heart failure (HCC)    History of aortic valve replacement with bioprosthetic valve    Leukocytosis    Paroxysmal atrial fibrillation (HCC)    Pharyngeal dysphagia    Personal history of colon cancer    Roberts's esophagus without dysplasia    Schatzki's ring of distal esophagus    Gastroesophageal reflux disease with esophagitis    Iron deficiency anemia    Mouth ulcers    Glossitis    CVID (common variable immunodeficiency) (HCC)    Foraminal stenosis of cervical region    Cervical radiculopathy    Platelets decreased (HCC)    Dysphagia    Abnormal chest CT      Past Medical History:   Diagnosis Date    Acute on chronic systolic (congestive) heart failure (HCC) 05/15/2024    Acute respiratory failure with hypoxia (HCC) 04/20/2022    Arthritis     Atrial fibrillation (HCC)     Cancer (HCC)     Cataract     Colitis     Colon polyp     Coronary artery disease 2014    Diabetes mellitus (HCC) ?    type 2    Fatty liver     GERD (gastroesophageal reflux disease)     History of chemotherapy     History of radiation therapy     History of shingles 2018    History of transfusion     Hyperlipidemia     Hypertension     Pneumonia 08/11/22    Sepsis without acute organ dysfunction (HCC) 02/04/2023    Shingles 2017      Past Surgical History:   Procedure Laterality Date    AORTIC VALVE REPLACEMENT      CARDIAC VALVE REPLACEMENT  2014    aorta    CATARACT EXTRACTION Bilateral     COLECTOMY      COLONOSCOPY  11/2019    Prior right hemicolectomy    CORONARY ARTERY BYPASS GRAFT      DENTAL SURGERY      JOINT REPLACEMENT  January 2019    left knee    KNEE SURGERY      LYMPH NODE BIOPSY  2003    LYMPHADENECTOMY      OTHER SURGICAL HISTORY      MAZE PROCEDURE    WI ARTHRP KNE CONDYLE&PLATU MEDIAL&LAT COMPARTMENTS Left 01/28/2019    Procedure: ARTHROPLASTY LEFT KNEE TOTAL;  Surgeon: Darell Delgado MD;  Location:  MAIN  "OR;  Service: Orthopedics    HI LARYNGOSCOPY DIRECT OPERATIVE W/BIOPSY Right 2022    Procedure: DIRECT LARYNGOSCOPY WITH BIOPSY RIGHT PHARYNX, POSSIBLE RIGHT NECK LYMPH NODE BIOPSY; FROZEN SECTION;  Surgeon: Kevin Hardin MD;  Location:  MAIN OR;  Service: ENT    SKIN BIOPSY      UPPER GASTROINTESTINAL ENDOSCOPY  2019    Schatzki ring    US GUIDED LYMPH NODE BIOPSY RIGHT  2021      Family History   Problem Relation Age of Onset    Heart block Family     Coronary artery disease Mother     Thrombosis Mother     Hypertension Mother     Arthritis Mother     Hyperlipidemia Mother     Diabetes Father     Hypertension Father     Arthritis Father     Sudden death Father         cardiac    Diabetes type II Father     Colon cancer Paternal Grandfather     Cancer Paternal Grandfather     Breast cancer Sister     Heart disease Brother         Coronary stents     Social History     Tobacco Use    Smoking status: Former     Current packs/day: 0.00     Average packs/day: 1 pack/day for 42.0 years (42.0 ttl pk-yrs)     Types: Cigarettes     Start date:      Quit date: 2009     Years since quittin.2    Smokeless tobacco: Never   Substance Use Topics    Alcohol use: Yes     Alcohol/week: 2.0 standard drinks of alcohol     Types: 2 Cans of beer per week     Comment: very rare \"beer here and there\"     Allergies   Allergen Reactions    Ceftin [Cefuroxime] Itching     However, has tolerated Cefazolin and Pip-Tazo since, which have different side chains. Be cautious with / avoid 2nd, 3rd, or 4th Gen Cephs that have similar side chains to Cefuroxime.    Lantus [Insulin Glargine] Itching       Current Outpatient Medications:     aspirin 81 MG tablet, Take 1 tablet (81 mg total) by mouth daily, Disp: , Rfl:     atorvastatin (LIPITOR) 40 mg tablet, TAKE 1 TABLET BY MOUTH EVERY DAY, Disp: 90 tablet, Rfl: 1    azithromycin (Zithromax Z-Travon) 250 mg tablet, Take 2 tablets today then 1 tablet daily x 4 days, Disp: 6 " tablet, Rfl: 0    cholecalciferol (VITAMIN D3) 1,000 units tablet, Take 2 tablets (2,000 Units total) by mouth daily, Disp: 60 tablet, Rfl: 0    Coenzyme Q10 (CoQ-10) 100 MG capsule, , Disp: , Rfl:     dicyclomine (BENTYL) 20 mg tablet, Take 1 tablet (20 mg total) by mouth 4 (four) times a day as needed (Abdominal cramping or pain) for up to 5 days, Disp: 20 tablet, Rfl: 0    ferrous sulfate 324 (65 Fe) mg, TAKE 1 TABLET BY MOUTH EVERY DAY WITH BREAKFAST, Disp: 90 tablet, Rfl: 0    furosemide (LASIX) 20 mg tablet, Take 1 tablet (20 mg total) by mouth daily, Disp: , Rfl:     glucose blood test strip, , Disp: , Rfl:     IMMUNE GLOBULIN, HUMAN, IV, Inject 30,000 mg into a catheter in a vein, Disp: , Rfl:     Jardiance 25 MG TABS, TAKE 1 TABLET BY MOUTH EVERY DAY IN THE MORNING, Disp: 90 tablet, Rfl: 1    metFORMIN (GLUCOPHAGE-XR) 500 mg 24 hr tablet, TAKE 3 TABLETS (1,500 MG TOTAL) BY MOUTH DAILY WITH BREAKFAST, Disp: 120 tablet, Rfl: 5    metoprolol succinate (TOPROL-XL) 100 mg 24 hr tablet, TAKE 1 TABLET BY MOUTH EVERY DAY, Disp: 90 tablet, Rfl: 1    montelukast (SINGULAIR) 10 mg tablet, TAKE 1 TABLET BY MOUTH EVERY DAY, Disp: 90 tablet, Rfl: 1    multivitamin-minerals (CENTRUM ADULTS) tablet, Take 1 tablet by mouth daily, Disp:  , Rfl: 0    omeprazole (PriLOSEC) 40 MG capsule, TAKE 1 CAPSULE (40 MG TOTAL) BY MOUTH DAILY., Disp: 90 capsule, Rfl: 1    ondansetron (ZOFRAN-ODT) 4 mg disintegrating tablet, Take 1 tablet (4 mg total) by mouth every 6 (six) hours as needed for nausea or vomiting, Disp: 20 tablet, Rfl: 0    valACYclovir (VALTREX) 500 mg tablet, Take 500 mg by mouth daily, Disp: , Rfl:     Review of Systems   Constitutional:  Negative for diaphoresis, fatigue and unexpected weight change.   Respiratory:  Negative for shortness of breath.    Cardiovascular:  Negative for chest pain and palpitations.   Gastrointestinal:  Negative for constipation and diarrhea.   Endocrine: Positive for polyuria (On lasix).  Negative for polydipsia.       Physical Exam:  There is no height or weight on file to calculate BMI.  There were no vitals taken for this visit.   Wt Readings from Last 3 Encounters:   03/13/25 65.8 kg (145 lb)   02/14/25 65.8 kg (145 lb)   02/12/25 69.4 kg (153 lb)       Physical Exam  Constitutional:       Appearance: Normal appearance.   Cardiovascular:      Rate and Rhythm: Normal rate and regular rhythm.      Pulses: Normal pulses.           Dorsalis pedis pulses are 2+ on the right side and 2+ on the left side.   Pulmonary:      Effort: Pulmonary effort is normal.   Abdominal:      General: Abdomen is flat.      Palpations: Abdomen is soft.   Feet:      Right foot:      Skin integrity: No ulcer, skin breakdown, erythema, warmth, callus or dry skin.      Left foot:      Skin integrity: No ulcer, skin breakdown, erythema, warmth, callus or dry skin.   Skin:     General: Skin is warm.      Capillary Refill: Capillary refill takes less than 2 seconds.   Neurological:      General: No focal deficit present.      Mental Status: He is alert.            Labs:    Latest Reference Range & Units 02/01/23 08:49 05/01/23 09:30 08/16/23 08:14 02/21/24 08:12 06/12/24 16:05 08/26/24 07:42   Hemoglobin A1C 4.8 - 5.6 % 8.9 (H) 6.2 (H) 5.9 (H) 5.9 (H) 5.8 (H) 5.8 (H)   (H): Data is abnormally high   Latest Reference Range & Units 02/21/24 08:12 08/26/24 07:42   EXT Creatinine Urine Not Estab. mg/dL 80.1 49.0   Albumin Creat Ratio 0 - 29 mg/g creat 17 35 (H)   Albumin,U,Random Not Estab. ug/mL 13.5 17.3   (H): Data is abnormally high     Latest Reference Range & Units 02/21/24 08:12 08/26/24 07:42   Cholesterol 100 - 199 mg/dL 117 92 (L)   Triglycerides 0 - 149 mg/dL 88 93   HDL >39 mg/dL 31 (L) 30 (L)   LDL Calculated 0 - 99 mg/dL 69 44   VLDL Cholesterol Shaw 5 - 40 mg/dL 17 18   (L): Data is abnormally low     04/14/18 08:44 07/26/19 11:26 03/18/22 11:51   Left Eye Diabetic Retinopathy None (E) None (E) None (E)   (E):  External lab result   04/14/18 08:44 07/26/19 11:26 03/18/22 11:51   Right Eye Diabetic Retinopathy None (E) None (E) None (E)   (E): External lab result  Impression & Plan:    Problem List Items Addressed This Visit    None            No orders of the defined types were placed in this encounter.      There are no Patient Instructions on file for this visit.    Patient is a 77yM with PMHx of well controlled T2DM since 6 years on metformin/jardiance only with complication on CAD s/p CABG in 2014 with dx of multiple myeloma on chemotherapy with some steriod induced hyperglycemia with other PMHx of hypertension, hyperlipidemia who presents today for Diabetes management. Last visit 08/24.     1) T2DM:- Patients HbA1C continuous to stay at prediabetic range but slightly higher than before at 6.2%. possibly higher A1C d/t higher carb intake. However considering his overall prognosis this is ok, did review maybe considering more protein drinks to help build muscle than carbs. He will work on this. For now no change in regime      Plan   - c/w Metformin 500mg daily   - c/w jardiance 25mg daily   - Check BG once a day- alternate fasting and bedtime  - Repeat HbA1C in 3 months     Screening  - Retinopathy- uptodate  - Lipid- uptodate 3/25 LDL <100, c/w Lipitor 20mg daily  - Urine microalbumin- Uptodate 3/25, abnormal, possibly also d/t his MM, repeat next visit. On Jardiance c/w     2) Hyperlipidemia:-  uptodate 3/25 LDL <100, c/w Lipitor 20mg daily  3) Hypertension:- BP in clinic 134/80, c/w toprol    Discussed with the patient and all questioned fully answered. He will call me if any problems arise.    Follow-up appointment in 3 months.     Counseled patient on diagnostic results, prognosis, risk and benefit of treatment options, instruction for management, importance of treatment compliance, Risk  factor reduction and impressions      Luciat La MD

## 2025-03-17 ENCOUNTER — OFFICE VISIT (OUTPATIENT)
Dept: ENDOCRINOLOGY | Facility: HOSPITAL | Age: 80
End: 2025-03-17
Payer: COMMERCIAL

## 2025-03-17 VITALS
BODY MASS INDEX: 22.02 KG/M2 | HEIGHT: 70 IN | SYSTOLIC BLOOD PRESSURE: 134 MMHG | HEART RATE: 76 BPM | WEIGHT: 153.8 LBS | DIASTOLIC BLOOD PRESSURE: 80 MMHG

## 2025-03-17 DIAGNOSIS — E78.5 DYSLIPIDEMIA: ICD-10-CM

## 2025-03-17 DIAGNOSIS — E11.9 TYPE 2 DIABETES MELLITUS WITHOUT COMPLICATION, WITHOUT LONG-TERM CURRENT USE OF INSULIN (HCC): Primary | ICD-10-CM

## 2025-03-17 DIAGNOSIS — Z95.1 HX OF CABG: ICD-10-CM

## 2025-03-17 DIAGNOSIS — I10 ESSENTIAL HYPERTENSION: ICD-10-CM

## 2025-03-17 PROCEDURE — 99214 OFFICE O/P EST MOD 30 MIN: CPT | Performed by: STUDENT IN AN ORGANIZED HEALTH CARE EDUCATION/TRAINING PROGRAM

## 2025-03-17 NOTE — TELEPHONE ENCOUNTER
03/17/25 10:14 AM    Patient contacted post ED visit, [facility] phone outreaches documented. Patient called practice and scheduled a follow-up ED visit.  3-17-25    Thank you.  Danette Villela  [facility]

## 2025-04-02 DIAGNOSIS — E11.9 TYPE 2 DIABETES MELLITUS WITHOUT COMPLICATION, WITHOUT LONG-TERM CURRENT USE OF INSULIN (HCC): Chronic | ICD-10-CM

## 2025-04-02 RX ORDER — EMPAGLIFLOZIN 25 MG/1
25 TABLET, FILM COATED ORAL EVERY MORNING
Qty: 90 TABLET | Refills: 1 | Status: SHIPPED | OUTPATIENT
Start: 2025-04-02

## 2025-04-14 ENCOUNTER — OFFICE VISIT (OUTPATIENT)
Dept: FAMILY MEDICINE CLINIC | Facility: HOSPITAL | Age: 80
End: 2025-04-14
Payer: COMMERCIAL

## 2025-04-14 ENCOUNTER — RESULTS FOLLOW-UP (OUTPATIENT)
Dept: FAMILY MEDICINE CLINIC | Facility: HOSPITAL | Age: 80
End: 2025-04-14

## 2025-04-14 ENCOUNTER — APPOINTMENT (OUTPATIENT)
Dept: LAB | Facility: HOSPITAL | Age: 80
End: 2025-04-14
Payer: COMMERCIAL

## 2025-04-14 VITALS
HEART RATE: 57 BPM | TEMPERATURE: 97.1 F | WEIGHT: 149 LBS | OXYGEN SATURATION: 99 % | HEIGHT: 70 IN | BODY MASS INDEX: 21.33 KG/M2 | DIASTOLIC BLOOD PRESSURE: 58 MMHG | SYSTOLIC BLOOD PRESSURE: 108 MMHG

## 2025-04-14 DIAGNOSIS — K52.9 ACUTE GASTROENTERITIS: ICD-10-CM

## 2025-04-14 DIAGNOSIS — E11.9 TYPE 2 DIABETES MELLITUS WITHOUT COMPLICATION, WITHOUT LONG-TERM CURRENT USE OF INSULIN (HCC): ICD-10-CM

## 2025-04-14 DIAGNOSIS — R11.2 NAUSEA AND VOMITING: ICD-10-CM

## 2025-04-14 DIAGNOSIS — K52.9 ACUTE GASTROENTERITIS: Primary | ICD-10-CM

## 2025-04-14 LAB
ALBUMIN SERPL BCG-MCNC: 3.9 G/DL (ref 3.5–5)
ALP SERPL-CCNC: 91 U/L (ref 34–104)
ALT SERPL W P-5'-P-CCNC: 16 U/L (ref 7–52)
ANION GAP SERPL CALCULATED.3IONS-SCNC: 7 MMOL/L (ref 4–13)
AST SERPL W P-5'-P-CCNC: 18 U/L (ref 13–39)
BILIRUB SERPL-MCNC: 0.57 MG/DL (ref 0.2–1)
BUN SERPL-MCNC: 13 MG/DL (ref 5–25)
CALCIUM SERPL-MCNC: 9.1 MG/DL (ref 8.4–10.2)
CHLORIDE SERPL-SCNC: 98 MMOL/L (ref 96–108)
CO2 SERPL-SCNC: 34 MMOL/L (ref 21–32)
CREAT SERPL-MCNC: 0.69 MG/DL (ref 0.6–1.3)
ERYTHROCYTE [DISTWIDTH] IN BLOOD BY AUTOMATED COUNT: 16.5 % (ref 11.6–15.1)
GFR SERPL CREATININE-BSD FRML MDRD: 90 ML/MIN/1.73SQ M
GLUCOSE P FAST SERPL-MCNC: 104 MG/DL (ref 65–99)
HCT VFR BLD AUTO: 44 % (ref 36.5–49.3)
HGB BLD-MCNC: 13.5 G/DL (ref 12–17)
MAGNESIUM SERPL-MCNC: 1.9 MG/DL (ref 1.9–2.7)
MCH RBC QN AUTO: 26.1 PG (ref 26.8–34.3)
MCHC RBC AUTO-ENTMCNC: 30.7 G/DL (ref 31.4–37.4)
MCV RBC AUTO: 85 FL (ref 82–98)
PLATELET # BLD AUTO: 230 THOUSANDS/UL (ref 149–390)
PMV BLD AUTO: 9.6 FL (ref 8.9–12.7)
POTASSIUM SERPL-SCNC: 3.4 MMOL/L (ref 3.5–5.3)
PROT SERPL-MCNC: 6.2 G/DL (ref 6.4–8.4)
RBC # BLD AUTO: 5.18 MILLION/UL (ref 3.88–5.62)
SODIUM SERPL-SCNC: 139 MMOL/L (ref 135–147)
WBC # BLD AUTO: 9.31 THOUSAND/UL (ref 4.31–10.16)

## 2025-04-14 PROCEDURE — 85027 COMPLETE CBC AUTOMATED: CPT

## 2025-04-14 PROCEDURE — 99214 OFFICE O/P EST MOD 30 MIN: CPT | Performed by: FAMILY MEDICINE

## 2025-04-14 PROCEDURE — 80053 COMPREHEN METABOLIC PANEL: CPT

## 2025-04-14 PROCEDURE — 36415 COLL VENOUS BLD VENIPUNCTURE: CPT

## 2025-04-14 PROCEDURE — 83735 ASSAY OF MAGNESIUM: CPT

## 2025-04-14 RX ORDER — ONDANSETRON 4 MG/1
4 TABLET, ORALLY DISINTEGRATING ORAL EVERY 6 HOURS PRN
Qty: 20 TABLET | Refills: 0 | Status: SHIPPED | OUTPATIENT
Start: 2025-04-14

## 2025-04-14 NOTE — ASSESSMENT & PLAN NOTE
Lab Results   Component Value Date    HGBA1C 6.2 (H) 03/06/2025     Age with udnerlying DM.   No episodes of hypoglycemia.   Check labs as above.

## 2025-04-14 NOTE — PROGRESS NOTES
Name: Gabe Pickard      : 1945      MRN: [id number]  Encounter Provider: Gabino Dash MD  Encounter Date: 2025   Encounter department: [facility] CARE [address]   :  Assessment & Plan  Nausea and vomiting    Orders:  •  ondansetron (ZOFRAN-ODT) 4 mg disintegrating tablet; Take 1 tablet (4 mg total) by mouth every 6 (six) hours as needed for nausea or vomiting  •  CBC; Future  •  Comprehensive metabolic panel; Future  •  Magnesium; Future    Acute gastroenteritis  Consitent with AGE.  With undelrying diabetes.   Continue with supportive treatment.     Has been about 12 days and poor oral intake.   Stat labs today, check for dylan, eletrolyte abnormalities.       Orders:  •  CBC; Future  •  Comprehensive metabolic panel; Future  •  Magnesium; Future    Type 2 diabetes mellitus without complication, without long-term current use of insulin ([facility])    Lab Results   Component Value Date    HGBA1C 6.2 (H) 2025     Age with udnerlying DM.   No episodes of hypoglycemia.   Check labs as above.               History of Present Illness   Close to 2 weeks of nausea , vomiting, and diarrhea. No diarrhea for about 3 days.   Still nauseous. Was at an event where multiple people to include his wife got sick.   Poor intake of fluid and food.   Urinating normally.   Has been taking zofran w some benefit. Has zofran due to MM treatment.     Diarrhea   Pertinent negatives include no abdominal pain or chills.   Nausea  Associated symptoms include nausea. Pertinent negatives include no abdominal pain, chest pain, chills, congestion or diaphoresis.     Review of Systems   Constitutional: Negative.  Negative for activity change, appetite change, chills and diaphoresis.   HENT:  Negative for congestion and dental problem.    Respiratory: Negative.  Negative for apnea, chest tightness, shortness of breath and wheezing.    Cardiovascular: Negative.  Negative for chest pain, palpitations and leg  "swelling.   Gastrointestinal:  Positive for diarrhea and nausea. Negative for abdominal distention, abdominal pain and constipation.   Genitourinary: Negative.  Negative for difficulty urinating, dysuria and frequency.       Objective   /58 (BP Location: Left arm, Patient Position: Sitting)   Pulse 57   Temp (!) 97.1 °F (36.2 °C)   Ht 5' 10\" (1.778 m)   Wt 67.6 kg (149 lb)   SpO2 99%   BMI 21.38 kg/m²      Physical Exam  Vitals reviewed.   Constitutional:       General: He is not in acute distress.     Appearance: He is well-developed. He is not ill-appearing.   HENT:      Head: Normocephalic and atraumatic.      Right Ear: External ear normal.      Left Ear: External ear normal.      Mouth/Throat:      Mouth: Mucous membranes are dry.      Pharynx: Oropharynx is clear.   Eyes:      Extraocular Movements: Extraocular movements intact.      Conjunctiva/sclera: Conjunctivae normal.      Pupils: Pupils are equal, round, and reactive to light.   Cardiovascular:      Rate and Rhythm: Normal rate and regular rhythm.      Heart sounds: Normal heart sounds. No murmur heard.  Pulmonary:      Effort: Pulmonary effort is normal.      Breath sounds: Normal breath sounds.   Abdominal:      General: Bowel sounds are normal. There is no distension.      Palpations: Abdomen is soft. There is no mass.      Tenderness: There is no abdominal tenderness. There is no guarding.      Hernia: No hernia is present.   Musculoskeletal:         General: Normal range of motion.      Cervical back: Normal range of motion and neck supple.   Skin:     General: Skin is warm and dry.      Capillary Refill: Capillary refill takes less than 2 seconds.   Neurological:      General: No focal deficit present.      Mental Status: He is alert and oriented to person, place, and time.   Psychiatric:         Mood and Affect: Mood normal.         Behavior: Behavior normal.         "

## 2025-04-17 DIAGNOSIS — C90.00 MULTIPLE MYELOMA NOT HAVING ACHIEVED REMISSION (HCC): ICD-10-CM

## 2025-04-18 RX ORDER — MONTELUKAST SODIUM 10 MG/1
10 TABLET ORAL DAILY
Qty: 90 TABLET | Refills: 1 | Status: SHIPPED | OUTPATIENT
Start: 2025-04-18

## 2025-04-19 DIAGNOSIS — K22.2 SCHATZKI'S RING OF DISTAL ESOPHAGUS: ICD-10-CM

## 2025-04-19 DIAGNOSIS — K22.70 BARRETT'S ESOPHAGUS WITHOUT DYSPLASIA: ICD-10-CM

## 2025-04-19 DIAGNOSIS — R13.19 ESOPHAGEAL DYSPHAGIA: ICD-10-CM

## 2025-04-19 DIAGNOSIS — K21.00 GASTROESOPHAGEAL REFLUX DISEASE WITH ESOPHAGITIS: ICD-10-CM

## 2025-04-19 RX ORDER — OMEPRAZOLE 40 MG/1
40 CAPSULE, DELAYED RELEASE ORAL DAILY
Qty: 90 CAPSULE | Refills: 1 | Status: SHIPPED | OUTPATIENT
Start: 2025-04-19

## 2025-04-25 ENCOUNTER — OFFICE VISIT (OUTPATIENT)
Dept: FAMILY MEDICINE CLINIC | Facility: HOSPITAL | Age: 80
End: 2025-04-25
Payer: COMMERCIAL

## 2025-04-25 ENCOUNTER — HOSPITAL ENCOUNTER (OUTPATIENT)
Dept: CT IMAGING | Facility: HOSPITAL | Age: 80
Discharge: HOME | End: 2025-04-25
Payer: COMMERCIAL

## 2025-04-25 VITALS
OXYGEN SATURATION: 97 % | TEMPERATURE: 97.7 F | DIASTOLIC BLOOD PRESSURE: 64 MMHG | HEART RATE: 76 BPM | SYSTOLIC BLOOD PRESSURE: 144 MMHG | HEIGHT: 70 IN | BODY MASS INDEX: 21.7 KG/M2 | WEIGHT: 151.6 LBS

## 2025-04-25 DIAGNOSIS — C90.00 MULTIPLE MYELOMA NOT HAVING ACHIEVED REMISSION (HCC): ICD-10-CM

## 2025-04-25 DIAGNOSIS — E11.29 TYPE 2 DIABETES MELLITUS WITH DIABETIC MICROALBUMINURIA, WITHOUT LONG-TERM CURRENT USE OF INSULIN (HCC): Primary | ICD-10-CM

## 2025-04-25 DIAGNOSIS — R80.9 TYPE 2 DIABETES MELLITUS WITH DIABETIC MICROALBUMINURIA, WITHOUT LONG-TERM CURRENT USE OF INSULIN (HCC): Primary | ICD-10-CM

## 2025-04-25 DIAGNOSIS — D83.9 CVID (COMMON VARIABLE IMMUNODEFICIENCY) (HCC): ICD-10-CM

## 2025-04-25 DIAGNOSIS — I50.32 CHRONIC DIASTOLIC CONGESTIVE HEART FAILURE (HCC): ICD-10-CM

## 2025-04-25 DIAGNOSIS — R93.89 ABNORMAL CHEST CT: ICD-10-CM

## 2025-04-25 DIAGNOSIS — E78.5 DYSLIPIDEMIA: Chronic | ICD-10-CM

## 2025-04-25 PROCEDURE — 99214 OFFICE O/P EST MOD 30 MIN: CPT | Performed by: INTERNAL MEDICINE

## 2025-04-25 PROCEDURE — 71250 CT THORAX DX C-: CPT

## 2025-04-25 NOTE — ASSESSMENT & PLAN NOTE
Lab Results   Component Value Date    HGBA1C 6.2 (H) 03/06/2025   DM type 2 with neuropathy - controlled with A1c 6.2 - pt admitting he stopped Metformin a few weeks ago d/t diarrhea, diarrhea has improved, rx removed from med list, will con't just Jardiance for now (will update Endo), DM foot exam done today, UTD on DM eye exam (3/25), he is on an ASA and a statin, will follow along with Endo

## 2025-04-25 NOTE — ASSESSMENT & PLAN NOTE
Wt Readings from Last 3 Encounters:   04/25/25 68.8 kg (151 lb 9.6 oz)   04/14/25 67.6 kg (149 lb)   03/17/25 69.8 kg (153 lb 12.8 oz)   Currently no sign of decompensation, Echo 12/24 reviewed, on beta blocker/ASA/statin and Jardiance, following with Cardio, call with CV concerns, will follow

## 2025-04-25 NOTE — PROGRESS NOTES
Name: Gabe Pickard      : 1945      MRN: [id number]  Encounter Provider: Radha Pettit DO  Encounter Date: 2025   Encounter department: [facility] CARE [address]   :  Assessment & Plan  Type 2 diabetes mellitus with diabetic microalbuminuria, without long-term current use of insulin (HCC)    Lab Results   Component Value Date    HGBA1C 6.2 (H) 2025   DM type 2 with neuropathy - controlled with A1c 6.2 - pt admitting he stopped Metformin a few weeks ago d/t diarrhea, diarrhea has improved, rx removed from med list, will con't just Jardiance for now (will update Endo), DM foot exam done today, UTD on DM eye exam (3/25), he is on an ASA and a statin, will follow along with Endo       Dyslipidemia  LDL at goal, on Atorvastatin, encouraged healthy diet and keep active, will follow       Multiple myeloma not having achieved remission (HCC)  Plasmacytoma active, on tx and has regular f/u with Onc, will follow       CVID (common variable immunodeficiency) (HCC)  On IVIG and doing well, diarrhea resolved but did have a fever over the weekend - call with recurrent diarrhea/fever       Chronic diastolic congestive heart failure (HCC)  Wt Readings from Last 3 Encounters:   25 68.8 kg (151 lb 9.6 oz)   25 67.6 kg (149 lb)   25 69.8 kg (153 lb 12.8 oz)   Currently no sign of decompensation, Echo  reviewed, on beta blocker/ASA/statin and Jardiance, following with Cardio, call with CV concerns, will follow       Abnormal chest CT  Just  had repeat imaging today, following with Pulm, will follow as well, call with new/worse resp symptoms           Depression Screening and Follow-up Plan: Patient was screened for depression during today's encounter. They screened negative with a PHQ-2 score of 0.      Colonoscopy  - 5 yrs if clinically indicated    PSA     BW 3/25 (A1C 6.2)  DM foot exam done in office today  DM eye exam 3/25 - 2 yrs  Ur  "microalbumin:Cr 3/25    AWV after 5/21/25      History of Present Illness   HPI Pt here for follow up appt and BW results    BW results from March 25 were reviewed with pt in detail: FBS/A1C 101/6.2,  rest of CMP was wnl, Ur microalbumin:Cr up a bit at 38, FLP with TC 91 and HDL 30, TG and LDL at goal     Def of controlled vs uncontrolled DM was reviewed.  Diet was reviewed - wgt up 2 lbs from Nov 24.  He is taking his Jardiance as directed. He stopped the Metformin this week d/t persistent diarrhea.  Off the Metformin the diarrhea is better.  He admits diet is good overall but he does \"treat himself\" intermittently.  He tries to keep active walking the dog. He is UTD on DM eye exam (8/23 - 2 yrs) and is coming up due for his DM foot exam (5/24).  He notes numbness B/L soles of feet.  He notes no pain and denies sores/ulcers.  Nml range for Ur microalbumin:Cr ratio was reviewed. He is on statin and ASA but no ACE or ARB.    Goal FLP was d/w pt in detail.  Diet/exercise reviewed as noted above.  He is taking his Atorvastatin daily as directed w/o SE.  He notes no stroke/TIA symptoms/CP.      Pt saw Onc (Dr. Farris) in Jan, Feb and March 25 for f/u myeloma/plasmacytoma and CVID - OV notes reviewed. He has his PET scheduled and remains on IVIG.  He was in the ED with B/L pneumonia in March and just saw Dr. Espinoza last week for gastritis.  He had seen Sindy in Feb for diarrhea.     Pt saw Cardio PA (Mau Chaudhary) in Dec 24 for f/u HFpEF and PAF and CAD - OV note reviewed.  Echo was ordered.  No meds were changed.  He was told to f/u in 6 mos or sooner if needed.  He remains on Toprol-XL, Lasix, Atorvastatin, ASA and Jardiance.  His Echo was done 12/31/24. EF 55%, AV working well and mild to mod MR noted.     Pt saw Pulm (Dr. Portillo) in Dec for f/u abnormal CT chest - OV note reviewed.  Repeat CT chest in 4-6 wks was recommended.  Repeat CT chest was done in Jan and persistent consolidation of LLL was noted. Enlarged " "lymph nodes were noted. F/u CT chest in 3 mos was advised. CT was just done today and readings pending.  He notes a fever on Sat & Sun with Tm 101.8 but no fever since then.  He notes no cough/SOB/wheezing.  He states today is the best he has felt in a while.        Review of Systems   Constitutional:  Positive for fever. Negative for chills.   HENT:  Negative for congestion and sore throat.    Eyes:  Negative for pain and visual disturbance.   Respiratory:  Negative for cough, shortness of breath and wheezing.    Cardiovascular:  Negative for chest pain and palpitations.   Gastrointestinal:  Positive for diarrhea. Negative for abdominal pain, blood in stool, constipation, nausea and vomiting.   Genitourinary:  Negative for difficulty urinating, dysuria and hematuria.   Musculoskeletal:  Negative for arthralgias, gait problem and myalgias.   Skin:  Negative for rash and wound.   Neurological:  Negative for dizziness and headaches.   Hematological:  Does not bruise/bleed easily.   Psychiatric/Behavioral:  Positive for sleep disturbance. Negative for dysphoric mood.        Objective   /64 (BP Location: Left arm, Patient Position: Sitting, Cuff Size: Standard)   Pulse 76   Temp 97.7 °F (36.5 °C)   Ht 5' 10\" (1.778 m)   Wt 68.8 kg (151 lb 9.6 oz)   SpO2 97%   BMI 21.75 kg/m²      Physical Exam  Vitals and nursing note reviewed.   Constitutional:       General: He is not in acute distress.     Appearance: He is well-developed. He is ill-appearing.   HENT:      Head: Normocephalic and atraumatic.      Right Ear: External ear normal.      Left Ear: External ear normal.   Eyes:      General:         Right eye: No discharge.         Left eye: No discharge.      Conjunctiva/sclera: Conjunctivae normal.   Neck:      Trachea: No tracheal deviation.   Cardiovascular:      Rate and Rhythm: Normal rate and regular rhythm.      Pulses: no weak pulses.           Dorsalis pedis pulses are 2+ on the right side and 2+ on " the left side.      Heart sounds: Murmur heard.   Pulmonary:      Effort: Pulmonary effort is normal. No respiratory distress.      Breath sounds: Normal breath sounds. No wheezing, rhonchi or rales.   Abdominal:      General: There is no distension.      Palpations: Abdomen is soft.      Tenderness: There is no abdominal tenderness. There is no guarding or rebound.   Musculoskeletal:      Cervical back: Neck supple.        Feet:    Feet:      Right foot:      Skin integrity: Dry skin present. No ulcer, skin breakdown, erythema, warmth or callus.      Left foot:      Skin integrity: Dry skin present. No ulcer, skin breakdown, erythema, warmth or callus.   Skin:     General: Skin is warm and dry.      Coloration: Skin is not pale.      Findings: No rash.   Neurological:      General: No focal deficit present.      Mental Status: He is alert. Mental status is at baseline.      Motor: No abnormal muscle tone.      Gait: Gait normal.   Psychiatric:         Mood and Affect: Mood normal.         Behavior: Behavior normal.         Thought Content: Thought content normal.         Judgment: Judgment normal.     Diabetic Foot Exam    Patient's shoes and socks removed.    Right Foot/Ankle   Right Foot Inspection  Skin Exam: skin normal, skin intact and dry skin. No warmth, no callus, no erythema, no maceration, no abnormal color, no pre-ulcer, no ulcer and no callus.     Toe Exam: ROM and strength within normal limits.     Sensory   Vibration: intact  Monofilament testing: intact    Vascular  The right DP pulse is 2+.     Left Foot/Ankle  Left Foot Inspection  Skin Exam: skin normal, skin intact and dry skin. No warmth, no erythema, no maceration, normal color, no pre-ulcer, no ulcer and no callus.     Toe Exam: ROM and strength within normal limits.     Sensory   Vibration: intact  Monofilament testing: diminished    Vascular  The left DP pulse is 2+.     Assign Risk Category  No deformity present  No loss of protective  sensation  No weak pulses  Risk: 0

## 2025-04-25 NOTE — ASSESSMENT & PLAN NOTE
Just  had repeat imaging today, following with Pulm, will follow as well, call with new/worse resp symptoms

## 2025-04-25 NOTE — ASSESSMENT & PLAN NOTE
On IVIG and doing well, diarrhea resolved but did have a fever over the weekend - call with recurrent diarrhea/fever

## 2025-04-28 ENCOUNTER — TELEPHONE (OUTPATIENT)
Dept: ADMINISTRATIVE | Facility: OTHER | Age: 80
End: 2025-04-28

## 2025-04-28 NOTE — TELEPHONE ENCOUNTER
----- Message from Vandana ROWELL sent at 4/25/2025  2:31 PM EDT -----  Regarding: DM eye exam: [facility]  04/25/25 2:31 PM    Hello, our patient Gabe Pickard has had Diabetic Eye Exam completed/performed. Please assist in updating the patient chart by pulling a previous Electronic Medical Record (EMR) document. The previous EMR is 3/9/2025. The date of service is 3/9/2025.    Thank you,  Vandana Ferrera MA  [facility] [address]

## 2025-04-28 NOTE — TELEPHONE ENCOUNTER
Upon review of the In Basket request we were able to locate, review, and update the patient chart as requested for Diabetic Eye Exam.    Any additional questions or concerns should be emailed to the Practice Liaisons via the appropriate education email address, please do not reply via In Basket.    Thank you  Ronni Patel MA   [facility]

## 2025-05-01 ENCOUNTER — OFFICE VISIT (OUTPATIENT)
Dept: FAMILY MEDICINE CLINIC | Facility: HOSPITAL | Age: 80
End: 2025-05-01
Payer: COMMERCIAL

## 2025-05-01 ENCOUNTER — APPOINTMENT (OUTPATIENT)
Dept: LAB | Facility: HOSPITAL | Age: 80
End: 2025-05-01

## 2025-05-01 VITALS
WEIGHT: 148.8 LBS | TEMPERATURE: 97 F | HEART RATE: 63 BPM | OXYGEN SATURATION: 99 % | BODY MASS INDEX: 21.3 KG/M2 | SYSTOLIC BLOOD PRESSURE: 136 MMHG | DIASTOLIC BLOOD PRESSURE: 65 MMHG | HEIGHT: 70 IN

## 2025-05-01 DIAGNOSIS — R10.13 EPIGASTRIC PAIN: Primary | ICD-10-CM

## 2025-05-01 DIAGNOSIS — R19.7 DIARRHEA, UNSPECIFIED TYPE: ICD-10-CM

## 2025-05-01 DIAGNOSIS — K21.00 GASTROESOPHAGEAL REFLUX DISEASE WITH ESOPHAGITIS: ICD-10-CM

## 2025-05-01 DIAGNOSIS — K21.00 GASTROESOPHAGEAL REFLUX DISEASE WITH ESOPHAGITIS WITHOUT HEMORRHAGE: Chronic | ICD-10-CM

## 2025-05-01 DIAGNOSIS — K22.70 BARRETT'S ESOPHAGUS WITHOUT DYSPLASIA: ICD-10-CM

## 2025-05-01 DIAGNOSIS — K22.2 SCHATZKI'S RING OF DISTAL ESOPHAGUS: ICD-10-CM

## 2025-05-01 DIAGNOSIS — R13.19 ESOPHAGEAL DYSPHAGIA: ICD-10-CM

## 2025-05-01 PROCEDURE — 99214 OFFICE O/P EST MOD 30 MIN: CPT | Performed by: INTERNAL MEDICINE

## 2025-05-01 RX ORDER — OMEPRAZOLE 40 MG/1
40 CAPSULE, DELAYED RELEASE ORAL 2 TIMES DAILY
Qty: 180 CAPSULE | Refills: 1 | Status: SHIPPED | OUTPATIENT
Start: 2025-05-01

## 2025-05-01 NOTE — ASSESSMENT & PLAN NOTE
Better with Omeprazole but still with some reflux intermittently, increase Omeprazole to bid, needs GI f/u - referral placed, will follow  Orders:    omeprazole (PriLOSEC) 40 MG capsule; Take 1 capsule (40 mg total) by mouth 2 (two) times a day    Ambulatory Referral to Gastroenterology; Future

## 2025-05-01 NOTE — PROGRESS NOTES
Name: Gabe Pickard      : 1945      MRN: [id number]  Encounter Provider: Radha Pettit DO  Encounter Date: 2025   Encounter department: [facility] CARE [address]   :  Assessment & Plan  Epigastric pain  Concerning with his h/o colon CA, progressive dz on CT and ill/thin appearance, had 2 CT A/P in  - not even 3 mos ago, will hold on imaging and get in to see GI, will check stool studies again d/t recent Salmonella, will increase Omeprazole to bid with onging reflux/swallowing issues, urged bland diet and keep hydrated, will follow  Orders:    Stool Enteric Bacterial Panel by PCR; Future    Clostridioides difficile toxin by PCR with EIA; Future    CBC and differential    Comprehensive metabolic panel    Lipase; Future    Ambulatory Referral to Gastroenterology; Future    Diarrhea, unspecified type  Stopped Metformin with some improvement but not resolved, will recheck stool studies d/t recent Salmonella and urged bland diet and Imodium prn, hold Imodium and call asap with red flag GI symptoms   Orders:    Stool Enteric Bacterial Panel by PCR; Future    Clostridioides difficile toxin by PCR with EIA; Future    Lipase; Future    Ambulatory Referral to Gastroenterology; Future    Gastroesophageal reflux disease with esophagitis without hemorrhage  Better with Omeprazole but still with some reflux intermittently, increase Omeprazole to bid, needs GI f/u - referral placed, will follow  Orders:    Ambulatory Referral to Gastroenterology; Future    Gastroesophageal reflux disease with esophagitis  Better with Omeprazole but still with some reflux intermittently, increase Omeprazole to bid, needs GI f/u - referral placed, will follow  Orders:    omeprazole (PriLOSEC) 40 MG capsule; Take 1 capsule (40 mg total) by mouth 2 (two) times a day    Ambulatory Referral to Gastroenterology; Future    Roberts's esophagus without dysplasia    Orders:    omeprazole (PriLOSEC) 40 MG  capsule; Take 1 capsule (40 mg total) by mouth 2 (two) times a day    Ambulatory Referral to Gastroenterology; Future    Esophageal dysphagia    Orders:    omeprazole (PriLOSEC) 40 MG capsule; Take 1 capsule (40 mg total) by mouth 2 (two) times a day    Ambulatory Referral to Gastroenterology; Future    Schatzki's ring of distal esophagus    Orders:    omeprazole (PriLOSEC) 40 MG capsule; Take 1 capsule (40 mg total) by mouth 2 (two) times a day    Ambulatory Referral to Gastroenterology; Future      Colonoscopy 1/21 - 5 yrs if clinically indicated     PSA 1/22     BW 3/25 (A1C 6.2)  DM foot exam 4/25  DM eye exam 3/25 - 2 yrs  Ur microalbumin:Cr 3/25     AWV after 5/21/25 - scheduled for Oct       History of Present Illness   HPI  Pt here for an acute visit    Pt had an episode of abd pain yesterday and then had vomiting and diarrhea.  Pain improved significantly after vomiting but he is still having the diarrhea. Pain was upper abd in the center/epigastric region and did not radiate.  He notes no blood in stools/black tarry stools.  Stools are a bit better this am but still not as formed as usually.  He notes appetite was fine today.  He denies F/C/urinary symptoms.  He notes no new medications/changes in diet/recent travel/sick contacts.  He took an Imodium last night with some benefit.  He has some reflux and issues swallowing.         Review of Systems   Constitutional:  Negative for appetite change, chills, fever and unexpected weight change.   HENT:  Positive for trouble swallowing. Negative for congestion.    Eyes:  Negative for pain and visual disturbance.   Respiratory:  Negative for cough and shortness of breath.    Cardiovascular:  Negative for chest pain and palpitations.   Gastrointestinal:  Positive for abdominal pain and diarrhea. Negative for blood in stool, constipation, nausea and vomiting.   Genitourinary:  Negative for difficulty urinating, dysuria and hematuria.   Musculoskeletal:  Negative for  "gait problem.   Skin:  Negative for rash and wound.   Neurological:  Negative for dizziness and headaches.   Hematological:  Does not bruise/bleed easily.   Psychiatric/Behavioral:  Negative for confusion.        Objective   /65 (BP Location: Left arm, Patient Position: Sitting, Cuff Size: Standard)   Pulse 63   Temp (!) 97 °F (36.1 °C)   Ht 5' 10\" (1.778 m)   Wt 67.5 kg (148 lb 12.8 oz)   SpO2 99%   BMI 21.35 kg/m²      Physical Exam  Vitals and nursing note reviewed.   Constitutional:       General: He is not in acute distress.     Appearance: He is well-developed. He is ill-appearing.      Comments: Chronically ill appearing and thin   HENT:      Head: Normocephalic and atraumatic.      Right Ear: External ear normal.      Left Ear: External ear normal.   Eyes:      General:         Right eye: No discharge.         Left eye: No discharge.      Conjunctiva/sclera: Conjunctivae normal.   Neck:      Trachea: No tracheal deviation.   Cardiovascular:      Rate and Rhythm: Normal rate and regular rhythm.      Heart sounds: Normal heart sounds. No murmur heard.  Pulmonary:      Effort: Pulmonary effort is normal. No respiratory distress.      Breath sounds: Normal breath sounds. No wheezing, rhonchi or rales.   Abdominal:      General: There is no distension.      Palpations: Abdomen is soft.      Tenderness: There is no abdominal tenderness. There is no right CVA tenderness, left CVA tenderness, guarding or rebound.   Musculoskeletal:      Cervical back: Neck supple.   Skin:     General: Skin is warm and dry.      Coloration: Skin is not pale.      Findings: No rash.   Neurological:      General: No focal deficit present.      Mental Status: He is alert. Mental status is at baseline.      Motor: No abnormal muscle tone.      Gait: Gait normal.   Psychiatric:         Mood and Affect: Mood normal.         Behavior: Behavior normal.         Thought Content: Thought content normal.         Judgment: Judgment " normal.

## 2025-05-01 NOTE — ASSESSMENT & PLAN NOTE
Orders:    omeprazole (PriLOSEC) 40 MG capsule; Take 1 capsule (40 mg total) by mouth 2 (two) times a day    Ambulatory Referral to Gastroenterology; Future

## 2025-05-01 NOTE — ASSESSMENT & PLAN NOTE
Better with Omeprazole but still with some reflux intermittently, increase Omeprazole to bid, needs GI f/u - referral placed, will follow  Orders:    Ambulatory Referral to Gastroenterology; Future

## 2025-05-05 ENCOUNTER — RESULTS FOLLOW-UP (OUTPATIENT)
Dept: FAMILY MEDICINE CLINIC | Facility: HOSPITAL | Age: 80
End: 2025-05-05

## 2025-05-05 ENCOUNTER — RESULTS FOLLOW-UP (OUTPATIENT)
Age: 80
End: 2025-05-05

## 2025-05-05 LAB
ADV 40+41 DNA STL QL NAA+NON-PROBE: NOT DETECTED
ALBUMIN SERPL-MCNC: 4 G/DL (ref 3.8–4.8)
ALP SERPL-CCNC: 121 IU/L (ref 44–121)
ALT SERPL-CCNC: 15 IU/L (ref 0–44)
AST SERPL-CCNC: 13 IU/L (ref 0–40)
ASTRO TYP 1-8 RNA STL QL NAA+NON-PROBE: NOT DETECTED
BASOPHILS # BLD AUTO: 0.1 X10E3/UL (ref 0–0.2)
BASOPHILS NFR BLD AUTO: 1 %
BILIRUB SERPL-MCNC: 0.4 MG/DL (ref 0–1.2)
BUN SERPL-MCNC: 14 MG/DL (ref 8–27)
BUN/CREAT SERPL: 18 (ref 10–24)
C CAYETANENSIS DNA STL QL NAA+NON-PROBE: NOT DETECTED
C COLI+JEJ+UPSA DNA STL QL NAA+NON-PROBE: NOT DETECTED
C DIF TOX TCDA+TCDB STL QL NAA+NON-PROBE: NOT DETECTED
C DIFF TOX GENS STL QL NAA+PROBE: NEGATIVE
CALCIUM SERPL-MCNC: 9.2 MG/DL (ref 8.6–10.2)
CHLORIDE SERPL-SCNC: 100 MMOL/L (ref 96–106)
CO2 SERPL-SCNC: 25 MMOL/L (ref 20–29)
CREAT SERPL-MCNC: 0.77 MG/DL (ref 0.76–1.27)
CRYPTOSP DNA STL QL NAA+NON-PROBE: NOT DETECTED
E COLI O157 DNA STL QL NAA+NON-PROBE: ABNORMAL
E HISTOLYT DNA STL QL NAA+NON-PROBE: NOT DETECTED
EAEC PAA PLAS AGGR+AATA ST NAA+NON-PRB: NOT DETECTED
EC STX1+STX2 GENES STL QL NAA+NON-PROBE: NOT DETECTED
EGFR: 92 ML/MIN/1.73
EOSINOPHIL # BLD AUTO: 0.1 X10E3/UL (ref 0–0.4)
EOSINOPHIL NFR BLD AUTO: 1 %
EPEC EAE GENE STL QL NAA+NON-PROBE: DETECTED
ERYTHROCYTE [DISTWIDTH] IN BLOOD BY AUTOMATED COUNT: 15.7 % (ref 11.6–15.4)
ETEC LTA+ST1A+ST1B TOX ST NAA+NON-PROBE: NOT DETECTED
G LAMBLIA DNA STL QL NAA+NON-PROBE: NOT DETECTED
GLOBULIN SER-MCNC: 1.7 G/DL (ref 1.5–4.5)
GLUCOSE SERPL-MCNC: 114 MG/DL (ref 70–99)
HCT VFR BLD AUTO: 39.2 % (ref 37.5–51)
HGB BLD-MCNC: 12.5 G/DL (ref 13–17.7)
IMM GRANULOCYTES # BLD: 0 X10E3/UL (ref 0–0.1)
IMM GRANULOCYTES NFR BLD: 0 %
LIPASE SERPL-CCNC: 38 U/L (ref 13–78)
LYMPHOCYTES # BLD AUTO: 1.2 X10E3/UL (ref 0.7–3.1)
LYMPHOCYTES NFR BLD AUTO: 13 %
MCH RBC QN AUTO: 26.7 PG (ref 26.6–33)
MCHC RBC AUTO-ENTMCNC: 31.9 G/DL (ref 31.5–35.7)
MCV RBC AUTO: 84 FL (ref 79–97)
MONOCYTES # BLD AUTO: 0.7 X10E3/UL (ref 0.1–0.9)
MONOCYTES NFR BLD AUTO: 7 %
NEUTROPHILS # BLD AUTO: 7.5 X10E3/UL (ref 1.4–7)
NEUTROPHILS NFR BLD AUTO: 78 %
NOROVIRUS GI+II RNA STL QL NAA+NON-PROBE: DETECTED
P SHIGELLOIDES DNA STL QL NAA+NON-PROBE: NOT DETECTED
PLATELET # BLD AUTO: 235 X10E3/UL (ref 150–450)
POTASSIUM SERPL-SCNC: 3.8 MMOL/L (ref 3.5–5.2)
PROT SERPL-MCNC: 5.7 G/DL (ref 6–8.5)
RBC # BLD AUTO: 4.68 X10E6/UL (ref 4.14–5.8)
RVA RNA STL QL NAA+NON-PROBE: NOT DETECTED
S ENT+BONG DNA STL QL NAA+NON-PROBE: DETECTED
SAPO I+II+IV+V RNA STL QL NAA+NON-PROBE: NOT DETECTED
SHIGELLA SP+EIEC IPAH ST NAA+NON-PROBE: NOT DETECTED
SODIUM SERPL-SCNC: 141 MMOL/L (ref 134–144)
V CHOL+PARA+VUL DNA STL QL NAA+NON-PROBE: NOT DETECTED
V CHOLERAE DNA STL QL NAA+NON-PROBE: NOT DETECTED
WBC # BLD AUTO: 9.6 X10E3/UL (ref 3.4–10.8)
Y ENTEROCOL DNA STL QL NAA+NON-PROBE: NOT DETECTED

## 2025-05-05 NOTE — TELEPHONE ENCOUNTER
Patient called in asking for OV and labs be faxed to [phone] (Dr. Mike). He said office has not received fax. Thank you

## 2025-05-06 ENCOUNTER — TELEPHONE (OUTPATIENT)
Age: 80
End: 2025-05-06

## 2025-05-07 ENCOUNTER — APPOINTMENT (OUTPATIENT)
Dept: LAB | Facility: HOSPITAL | Age: 80
End: 2025-05-07
Payer: COMMERCIAL

## 2025-05-07 DIAGNOSIS — A02.20: ICD-10-CM

## 2025-05-07 DIAGNOSIS — I25.10 CORONARY ARTERY DISEASE INVOLVING NATIVE CORONARY ARTERY OF NATIVE HEART WITHOUT ANGINA PECTORIS: ICD-10-CM

## 2025-05-07 DIAGNOSIS — I48.0 PAROXYSMAL ATRIAL FIBRILLATION (HCC): Primary | Chronic | ICD-10-CM

## 2025-05-07 DIAGNOSIS — Z95.1 HX OF CABG: ICD-10-CM

## 2025-05-07 PROCEDURE — 36415 COLL VENOUS BLD VENIPUNCTURE: CPT

## 2025-05-07 PROCEDURE — 87040 BLOOD CULTURE FOR BACTERIA: CPT

## 2025-05-08 ENCOUNTER — TELEPHONE (OUTPATIENT)
Age: 80
End: 2025-05-08

## 2025-05-08 NOTE — TELEPHONE ENCOUNTER
Caller: Gabe Pickard    Doctor: Dr. Garcia    Call back #: [phone]    Reason for call: Patient called to schedule his echocardiogram for tomorrow. Patient stated that his infectious disease doctor needs patient to complete this by today or Friday for his appointment on Monday. I spoke to central scheduling and the soonest they could get patient in for a test was May 28th. They said it was because patient had a routine referral for his echo. Patient requested for an ASAP referral type for his echo so he could receive the test either today or tomorrow before his appointment on Monday.

## 2025-05-12 ENCOUNTER — HOSPITAL ENCOUNTER (OUTPATIENT)
Dept: NON INVASIVE DIAGNOSTICS | Facility: HOSPITAL | Age: 80
Discharge: HOME | End: 2025-05-12
Payer: COMMERCIAL

## 2025-05-12 VITALS
HEART RATE: 68 BPM | SYSTOLIC BLOOD PRESSURE: 136 MMHG | BODY MASS INDEX: 21.19 KG/M2 | WEIGHT: 148 LBS | DIASTOLIC BLOOD PRESSURE: 65 MMHG | HEIGHT: 70 IN

## 2025-05-12 DIAGNOSIS — I48.0 PAROXYSMAL ATRIAL FIBRILLATION (HCC): Chronic | ICD-10-CM

## 2025-05-12 DIAGNOSIS — I25.10 CORONARY ARTERY DISEASE INVOLVING NATIVE CORONARY ARTERY OF NATIVE HEART WITHOUT ANGINA PECTORIS: ICD-10-CM

## 2025-05-12 DIAGNOSIS — Z95.1 HX OF CABG: ICD-10-CM

## 2025-05-12 LAB
AORTIC ROOT: 3.3 CM
AORTIC VALVE MEAN VELOCITY: 14.6 M/S
ASCENDING AORTA: 3.3 CM
AV AREA BY CONTINUOUS VTI: 1.8 CM2
AV AREA PEAK VELOCITY: 1.6 CM2
AV LVOT MEAN GRADIENT: 2 MMHG
AV LVOT PEAK GRADIENT: 3 MMHG
AV MEAN PRESS GRAD SYS DOP V1V2: 11 MMHG
AV ORIFICE AREA US: 1.76 CM2
AV PEAK GRADIENT: 18 MMHG
AV VELOCITY RATIO: 0.42
AV VMAX SYS DOP: 2.11 M/S
BACTERIA BLD CULT: NORMAL
BACTERIA BLD CULT: NORMAL
BSA FOR ECHO PROCEDURE: 1.84 M2
DOP CALC AO VTI: 44.02 CM
DOP CALC LVOT AREA: 4.15 CM2
DOP CALC LVOT CARDIAC INDEX: 3.3 L/MIN/M2
DOP CALC LVOT CARDIAC OUTPUT: 6.07 L/MIN
DOP CALC LVOT DIAMETER: 2.3 CM
DOP CALC LVOT PEAK VEL VTI: 18.65 CM
DOP CALC LVOT PEAK VEL: 0.82 M/S
DOP CALC LVOT STROKE INDEX: 42.9 ML/M2
DOP CALC LVOT STROKE VOLUME: 77.45
FRACTIONAL SHORTENING: 39 (ref 28–44)
GLOBAL LONGITUIDAL STRAIN: -13 %
INTERVENTRICULAR SEPTUM IN DIASTOLE (PARASTERNAL SHORT AXIS VIEW): 1.1 CM
INTERVENTRICULAR SEPTUM: 1.1 CM (ref 0.6–1.1)
LAAS-AP2: 23.3 CM2
LAAS-AP4: 20.4 CM2
LEFT ATRIUM SIZE: 4.8 CM
LEFT ATRIUM VOLUME (MOD BIPLANE): 66 ML
LEFT ATRIUM VOLUME INDEX (MOD BIPLANE): 35.9 ML/M2
LEFT INTERNAL DIMENSION IN SYSTOLE: 3.6 CM (ref 2.1–4)
LEFT VENTRICLE DIASTOLIC VOLUME (MOD BIPLANE): 153 ML
LEFT VENTRICLE DIASTOLIC VOLUME INDEX (MOD BIPLANE): 83.2 ML/M2
LEFT VENTRICLE SYSTOLIC VOLUME (MOD BIPLANE): 68 ML
LEFT VENTRICLE SYSTOLIC VOLUME INDEX (MOD BIPLANE): 37 ML/M2
LEFT VENTRICULAR INTERNAL DIMENSION IN DIASTOLE: 5.9 CM (ref 3.5–6)
LEFT VENTRICULAR POSTERIOR WALL IN END DIASTOLE: 1.2 CM
LEFT VENTRICULAR STROKE VOLUME: 118 ML
LV EF BIPLANE MOD: 56 %
LV EF US.2D.A4C+ESTIMATED: 60 %
LVSV (TEICH): 118 ML
MV E'TISSUE VEL-LAT: 7 CM/S
RIGHT ATRIUM AREA SYSTOLE A4C: 23.2 CM2
RIGHT VENTRICLE ID DIMENSION: 3.7 CM
SL CV LEFT ATRIUM LENGTH A2C: 6 CM
SL CV LV EF: 55
SL CV PED ECHO LEFT VENTRICLE DIASTOLIC VOLUME (MOD BIPLANE) 2D: 172 ML
SL CV PED ECHO LEFT VENTRICLE SYSTOLIC VOLUME (MOD BIPLANE) 2D: 54 ML
TR MAX PG: 43 MMHG
TR PEAK VELOCITY: 3.3 M/S
TRICUSPID VALVE PEAK REGURGITATION VELOCITY: 3.27 M/S

## 2025-05-12 PROCEDURE — 93306 TTE W/DOPPLER COMPLETE: CPT | Performed by: INTERNAL MEDICINE

## 2025-05-12 PROCEDURE — 93356 MYOCRD STRAIN IMG SPCKL TRCK: CPT

## 2025-05-12 PROCEDURE — 93306 TTE W/DOPPLER COMPLETE: CPT

## 2025-05-12 PROCEDURE — 93356 MYOCRD STRAIN IMG SPCKL TRCK: CPT | Performed by: INTERNAL MEDICINE

## 2025-05-14 ENCOUNTER — RESULTS FOLLOW-UP (OUTPATIENT)
Dept: CARDIOLOGY CLINIC | Facility: CLINIC | Age: 80
End: 2025-05-14

## 2025-05-26 ENCOUNTER — APPOINTMENT (EMERGENCY)
Dept: CT IMAGING | Facility: HOSPITAL | Age: 80
DRG: 194 | End: 2025-05-26
Payer: COMMERCIAL

## 2025-05-26 ENCOUNTER — APPOINTMENT (EMERGENCY)
Dept: RADIOLOGY | Facility: HOSPITAL | Age: 80
DRG: 194 | End: 2025-05-26
Payer: COMMERCIAL

## 2025-05-26 ENCOUNTER — HOSPITAL ENCOUNTER (INPATIENT)
Facility: HOSPITAL | Age: 80
LOS: 1 days | Discharge: HOME | DRG: 194 | End: 2025-05-27
Attending: EMERGENCY MEDICINE | Admitting: INTERNAL MEDICINE
Payer: COMMERCIAL

## 2025-05-26 DIAGNOSIS — C90.00 MULTIPLE MYELOMA (HCC): ICD-10-CM

## 2025-05-26 DIAGNOSIS — J18.9 MULTIFOCAL PNEUMONIA: Primary | ICD-10-CM

## 2025-05-26 DIAGNOSIS — R07.9 CHEST PAIN: ICD-10-CM

## 2025-05-26 PROBLEM — R79.89 ELEVATED BRAIN NATRIURETIC PEPTIDE (BNP) LEVEL: Status: ACTIVE | Noted: 2025-05-26

## 2025-05-26 LAB
2HR DELTA HS TROPONIN: -1 NG/L
4HR DELTA HS TROPONIN: -1 NG/L
ALBUMIN SERPL BCG-MCNC: 3.9 G/DL (ref 3.5–5)
ALP SERPL-CCNC: 122 U/L (ref 34–104)
ALT SERPL W P-5'-P-CCNC: 17 U/L (ref 7–52)
ANION GAP SERPL CALCULATED.3IONS-SCNC: 8 MMOL/L (ref 4–13)
APTT PPP: 30 SECONDS (ref 23–34)
AST SERPL W P-5'-P-CCNC: 18 U/L (ref 13–39)
ATRIAL RATE: 357 BPM
BASOPHILS # BLD AUTO: 0.08 THOUSANDS/ÂΜL (ref 0–0.1)
BASOPHILS NFR BLD AUTO: 1 % (ref 0–1)
BILIRUB SERPL-MCNC: 0.6 MG/DL (ref 0.2–1)
BILIRUB UR QL STRIP: NEGATIVE
BNP SERPL-MCNC: 810 PG/ML (ref 0–100)
BUN SERPL-MCNC: 15 MG/DL (ref 5–25)
CALCIUM SERPL-MCNC: 8.8 MG/DL (ref 8.4–10.2)
CARDIAC TROPONIN I PNL SERPL HS: 19 NG/L (ref ?–50)
CARDIAC TROPONIN I PNL SERPL HS: 19 NG/L (ref ?–50)
CARDIAC TROPONIN I PNL SERPL HS: 20 NG/L (ref ?–50)
CHLORIDE SERPL-SCNC: 104 MMOL/L (ref 96–108)
CLARITY UR: CLEAR
CO2 SERPL-SCNC: 28 MMOL/L (ref 21–32)
COLOR UR: ABNORMAL
CREAT SERPL-MCNC: 0.83 MG/DL (ref 0.6–1.3)
D DIMER PPP FEU-MCNC: <0.27 UG/ML FEU
EOSINOPHIL # BLD AUTO: 0.05 THOUSAND/ÂΜL (ref 0–0.61)
EOSINOPHIL NFR BLD AUTO: 0 % (ref 0–6)
ERYTHROCYTE [DISTWIDTH] IN BLOOD BY AUTOMATED COUNT: 16.7 % (ref 11.6–15.1)
GFR SERPL CREATININE-BSD FRML MDRD: 84 ML/MIN/1.73SQ M
GLUCOSE SERPL-MCNC: 118 MG/DL (ref 65–140)
GLUCOSE UR STRIP-MCNC: ABNORMAL MG/DL
HCT VFR BLD AUTO: 40.3 % (ref 36.5–49.3)
HGB BLD-MCNC: 12.3 G/DL (ref 12–17)
HGB UR QL STRIP.AUTO: NEGATIVE
IMM GRANULOCYTES # BLD AUTO: 0.09 THOUSAND/UL (ref 0–0.2)
IMM GRANULOCYTES NFR BLD AUTO: 1 % (ref 0–2)
INR PPP: 1.13 (ref 0.85–1.19)
KETONES UR STRIP-MCNC: NEGATIVE MG/DL
LEUKOCYTE ESTERASE UR QL STRIP: NEGATIVE
LYMPHOCYTES # BLD AUTO: 1.65 THOUSANDS/ÂΜL (ref 0.6–4.47)
LYMPHOCYTES NFR BLD AUTO: 12 % (ref 14–44)
MAGNESIUM SERPL-MCNC: 2 MG/DL (ref 1.9–2.7)
MCH RBC QN AUTO: 25.8 PG (ref 26.8–34.3)
MCHC RBC AUTO-ENTMCNC: 30.5 G/DL (ref 31.4–37.4)
MCV RBC AUTO: 85 FL (ref 82–98)
MONOCYTES # BLD AUTO: 1.21 THOUSAND/ÂΜL (ref 0.17–1.22)
MONOCYTES NFR BLD AUTO: 9 % (ref 4–12)
NEUTROPHILS # BLD AUTO: 11.05 THOUSANDS/ÂΜL (ref 1.85–7.62)
NEUTS SEG NFR BLD AUTO: 77 % (ref 43–75)
NITRITE UR QL STRIP: NEGATIVE
NRBC BLD AUTO-RTO: 0 /100 WBCS
PH UR STRIP.AUTO: 5.5 [PH]
PLATELET # BLD AUTO: 222 THOUSANDS/UL (ref 149–390)
PMV BLD AUTO: 9.9 FL (ref 8.9–12.7)
POTASSIUM SERPL-SCNC: 3.4 MMOL/L (ref 3.5–5.3)
PROCALCITONIN SERPL-MCNC: 0.12 NG/ML
PROT SERPL-MCNC: 6.4 G/DL (ref 6.4–8.4)
PROT UR STRIP-MCNC: NEGATIVE MG/DL
PROTHROMBIN TIME: 15.1 SECONDS (ref 12.3–15)
QRS AXIS: 99 DEGREES
QRSD INTERVAL: 132 MS
QT INTERVAL: 464 MS
QTC INTERVAL: 511 MS
RBC # BLD AUTO: 4.76 MILLION/UL (ref 3.88–5.62)
SODIUM SERPL-SCNC: 140 MMOL/L (ref 135–147)
SP GR UR STRIP.AUTO: <1.005 (ref 1–1.03)
T WAVE AXIS: 79 DEGREES
TSH SERPL DL<=0.05 MIU/L-ACNC: 4.17 UIU/ML (ref 0.45–4.5)
UROBILINOGEN UR STRIP-ACNC: <2 MG/DL
VENTRICULAR RATE: 73 BPM
WBC # BLD AUTO: 14.13 THOUSAND/UL (ref 4.31–10.16)

## 2025-05-26 PROCEDURE — 93010 ELECTROCARDIOGRAM REPORT: CPT | Performed by: INTERNAL MEDICINE

## 2025-05-26 PROCEDURE — 85730 THROMBOPLASTIN TIME PARTIAL: CPT | Performed by: EMERGENCY MEDICINE

## 2025-05-26 PROCEDURE — 87449 NOS EACH ORGANISM AG IA: CPT | Performed by: INTERNAL MEDICINE

## 2025-05-26 PROCEDURE — 84443 ASSAY THYROID STIM HORMONE: CPT | Performed by: INTERNAL MEDICINE

## 2025-05-26 PROCEDURE — 36415 COLL VENOUS BLD VENIPUNCTURE: CPT | Performed by: EMERGENCY MEDICINE

## 2025-05-26 PROCEDURE — 85610 PROTHROMBIN TIME: CPT | Performed by: EMERGENCY MEDICINE

## 2025-05-26 PROCEDURE — 99285 EMERGENCY DEPT VISIT HI MDM: CPT

## 2025-05-26 PROCEDURE — 83880 ASSAY OF NATRIURETIC PEPTIDE: CPT | Performed by: EMERGENCY MEDICINE

## 2025-05-26 PROCEDURE — 87081 CULTURE SCREEN ONLY: CPT | Performed by: INTERNAL MEDICINE

## 2025-05-26 PROCEDURE — 83735 ASSAY OF MAGNESIUM: CPT | Performed by: EMERGENCY MEDICINE

## 2025-05-26 PROCEDURE — 94640 AIRWAY INHALATION TREATMENT: CPT

## 2025-05-26 PROCEDURE — 93005 ELECTROCARDIOGRAM TRACING: CPT

## 2025-05-26 PROCEDURE — 94760 N-INVAS EAR/PLS OXIMETRY 1: CPT

## 2025-05-26 PROCEDURE — 71250 CT THORAX DX C-: CPT

## 2025-05-26 PROCEDURE — 99285 EMERGENCY DEPT VISIT HI MDM: CPT | Performed by: EMERGENCY MEDICINE

## 2025-05-26 PROCEDURE — 84484 ASSAY OF TROPONIN QUANT: CPT | Performed by: EMERGENCY MEDICINE

## 2025-05-26 PROCEDURE — 80053 COMPREHEN METABOLIC PANEL: CPT | Performed by: EMERGENCY MEDICINE

## 2025-05-26 PROCEDURE — 94664 DEMO&/EVAL PT USE INHALER: CPT

## 2025-05-26 PROCEDURE — 85025 COMPLETE CBC W/AUTO DIFF WBC: CPT | Performed by: EMERGENCY MEDICINE

## 2025-05-26 PROCEDURE — 96374 THER/PROPH/DIAG INJ IV PUSH: CPT

## 2025-05-26 PROCEDURE — 71046 X-RAY EXAM CHEST 2 VIEWS: CPT

## 2025-05-26 PROCEDURE — 85379 FIBRIN DEGRADATION QUANT: CPT | Performed by: EMERGENCY MEDICINE

## 2025-05-26 PROCEDURE — 84145 PROCALCITONIN (PCT): CPT | Performed by: INTERNAL MEDICINE

## 2025-05-26 PROCEDURE — 87040 BLOOD CULTURE FOR BACTERIA: CPT | Performed by: INTERNAL MEDICINE

## 2025-05-26 PROCEDURE — 99222 1ST HOSP IP/OBS MODERATE 55: CPT | Performed by: INTERNAL MEDICINE

## 2025-05-26 RX ORDER — ALBUTEROL SULFATE 5 MG/ML
5 SOLUTION RESPIRATORY (INHALATION) ONCE
Status: COMPLETED | OUTPATIENT
Start: 2025-05-26 | End: 2025-05-26

## 2025-05-26 RX ORDER — ENOXAPARIN SODIUM 100 MG/ML
40 INJECTION SUBCUTANEOUS DAILY
Status: DISCONTINUED | OUTPATIENT
Start: 2025-05-27 | End: 2025-05-27 | Stop reason: HOSPADM

## 2025-05-26 RX ORDER — VANCOMYCIN HYDROCHLORIDE 750 MG/150ML
750 INJECTION, SOLUTION INTRAVENOUS EVERY 12 HOURS
Status: DISCONTINUED | OUTPATIENT
Start: 2025-05-27 | End: 2025-05-27 | Stop reason: HOSPADM

## 2025-05-26 RX ORDER — CEFEPIME HYDROCHLORIDE 2 G/50ML
2000 INJECTION, SOLUTION INTRAVENOUS EVERY 8 HOURS
Status: DISCONTINUED | OUTPATIENT
Start: 2025-05-26 | End: 2025-05-26

## 2025-05-26 RX ORDER — POTASSIUM CHLORIDE 1500 MG/1
40 TABLET, EXTENDED RELEASE ORAL ONCE
Status: COMPLETED | OUTPATIENT
Start: 2025-05-26 | End: 2025-05-26

## 2025-05-26 RX ORDER — PANTOPRAZOLE SODIUM 40 MG/1
40 TABLET, DELAYED RELEASE ORAL
Status: DISCONTINUED | OUTPATIENT
Start: 2025-05-26 | End: 2025-05-27 | Stop reason: HOSPADM

## 2025-05-26 RX ORDER — ONDANSETRON 2 MG/ML
4 INJECTION INTRAMUSCULAR; INTRAVENOUS EVERY 6 HOURS PRN
Status: DISCONTINUED | OUTPATIENT
Start: 2025-05-26 | End: 2025-05-26

## 2025-05-26 RX ORDER — ACETAMINOPHEN 325 MG/1
650 TABLET ORAL EVERY 6 HOURS PRN
Status: DISCONTINUED | OUTPATIENT
Start: 2025-05-26 | End: 2025-05-27 | Stop reason: HOSPADM

## 2025-05-26 RX ORDER — POLYETHYLENE GLYCOL 3350 17 G/17G
17 POWDER, FOR SOLUTION ORAL DAILY
Status: DISCONTINUED | OUTPATIENT
Start: 2025-05-26 | End: 2025-05-27 | Stop reason: HOSPADM

## 2025-05-26 RX ORDER — ATORVASTATIN CALCIUM 40 MG/1
40 TABLET, FILM COATED ORAL DAILY
Status: DISCONTINUED | OUTPATIENT
Start: 2025-05-27 | End: 2025-05-27 | Stop reason: HOSPADM

## 2025-05-26 RX ORDER — DOCUSATE SODIUM 100 MG/1
100 CAPSULE, LIQUID FILLED ORAL 2 TIMES DAILY
Status: DISCONTINUED | OUTPATIENT
Start: 2025-05-26 | End: 2025-05-27 | Stop reason: HOSPADM

## 2025-05-26 RX ORDER — ACETAMINOPHEN 10 MG/ML
1000 INJECTION, SOLUTION INTRAVENOUS ONCE
Status: COMPLETED | OUTPATIENT
Start: 2025-05-26 | End: 2025-05-26

## 2025-05-26 RX ORDER — VALACYCLOVIR HYDROCHLORIDE 500 MG/1
500 TABLET, FILM COATED ORAL DAILY
Status: DISCONTINUED | OUTPATIENT
Start: 2025-05-27 | End: 2025-05-27 | Stop reason: HOSPADM

## 2025-05-26 RX ORDER — ASPIRIN 81 MG/1
81 TABLET, CHEWABLE ORAL DAILY
Status: DISCONTINUED | OUTPATIENT
Start: 2025-05-27 | End: 2025-05-27 | Stop reason: HOSPADM

## 2025-05-26 RX ORDER — GUAIFENESIN 600 MG/1
600 TABLET, EXTENDED RELEASE ORAL 2 TIMES DAILY
Status: DISCONTINUED | OUTPATIENT
Start: 2025-05-26 | End: 2025-05-27 | Stop reason: HOSPADM

## 2025-05-26 RX ORDER — UBIDECARENONE 30 MG
100 CAPSULE ORAL DAILY
Status: DISCONTINUED | OUTPATIENT
Start: 2025-05-27 | End: 2025-05-27 | Stop reason: HOSPADM

## 2025-05-26 RX ORDER — METOPROLOL SUCCINATE 50 MG/1
100 TABLET, EXTENDED RELEASE ORAL DAILY
Status: DISCONTINUED | OUTPATIENT
Start: 2025-05-27 | End: 2025-05-27 | Stop reason: HOSPADM

## 2025-05-26 RX ORDER — FUROSEMIDE 20 MG/1
20 TABLET ORAL DAILY
Status: DISCONTINUED | OUTPATIENT
Start: 2025-05-27 | End: 2025-05-27 | Stop reason: HOSPADM

## 2025-05-26 RX ORDER — MONTELUKAST SODIUM 10 MG/1
10 TABLET ORAL DAILY
Status: DISCONTINUED | OUTPATIENT
Start: 2025-05-27 | End: 2025-05-27 | Stop reason: HOSPADM

## 2025-05-26 RX ADMIN — PIPERACILLIN AND TAZOBACTAM 4.5 G: 4; .5 INJECTION, POWDER, FOR SOLUTION INTRAVENOUS at 17:04

## 2025-05-26 RX ADMIN — ACETAMINOPHEN 1000 MG: 10 INJECTION INTRAVENOUS at 07:41

## 2025-05-26 RX ADMIN — IPRATROPIUM BROMIDE 0.5 MG: 0.5 SOLUTION RESPIRATORY (INHALATION) at 11:00

## 2025-05-26 RX ADMIN — PANTOPRAZOLE SODIUM 40 MG: 40 TABLET, DELAYED RELEASE ORAL at 17:03

## 2025-05-26 RX ADMIN — ALBUTEROL SULFATE 5 MG: 2.5 SOLUTION RESPIRATORY (INHALATION) at 11:00

## 2025-05-26 RX ADMIN — PIPERACILLIN AND TAZOBACTAM 4.5 G: 4; .5 INJECTION, POWDER, FOR SOLUTION INTRAVENOUS at 12:11

## 2025-05-26 RX ADMIN — POTASSIUM CHLORIDE 40 MEQ: 1500 TABLET, EXTENDED RELEASE ORAL at 10:59

## 2025-05-26 RX ADMIN — VANCOMYCIN HYDROCHLORIDE 1750 MG: 1 INJECTION, POWDER, LYOPHILIZED, FOR SOLUTION INTRAVENOUS at 14:44

## 2025-05-26 RX ADMIN — GUAIFENESIN 600 MG: 600 TABLET ORAL at 17:03

## 2025-05-26 NOTE — ASSESSMENT & PLAN NOTE
Van no cardiac  C/o chest tightness  Associates it with pneumonia, experiences same kind of chest pressure with every pneumonia episode  Trop- negative   EKG reviewed

## 2025-05-26 NOTE — ASSESSMENT & PLAN NOTE
Recent Labs     05/26/25  0718   WBC 14.13*      In the setting of multifocal pneumonia  Treatment as above  Continue to monitor

## 2025-05-26 NOTE — RESPIRATORY THERAPY NOTE
RT Protocol Note  Gabe Pickard 79 y.o. male MRN: [id number]  Unit/Bed#: -01 Encounter: [id number]    Assessment    Principal Problem:    Multifocal pneumonia  Active Problems:    Dyslipidemia    Leukocytosis    Hypokalemia    Elevated brain natriuretic peptide (BNP) level    Chest pain      Home Pulmonary Medications:     05/26/25 1440   Inhalation Therapy Tx   $ Demo/Eval of Noel, MDI, IPPB, CPT Yes   $ Pulse Oximetry Spot Check Charge Completed   Pre-Treatment Pulse 63   Breath Sounds Pre-Treatment Bilateral Diminished;Clear            Past Medical History[1]  Social History[1]    Subjective         Objective    Physical Exam:   Assessment Type: Assess only  General Appearance: Alert, Awake  Respiratory Pattern: Normal  Chest Assessment: Chest expansion symmetrical  Bilateral Breath Sounds: Diminished, Coarse  Cough: Non-productive    Vitals:  Blood pressure 137/66, pulse 61, temperature 98.1 °F (36.7 °C), resp. rate 20, SpO2 93%.          Imaging and other studies:           Plan    Respiratory Plan: No distress/Pulmonary history                 [1]   Past Medical History:  Diagnosis Date    Acute on chronic systolic (congestive) heart failure (HCC) 05/15/2024    Acute respiratory failure with hypoxia (HCC) 04/20/2022    Cataract     Colitis     Colon polyp     Fatty liver     History of chemotherapy     History of radiation therapy     History of shingles 2018    History of transfusion     Hyperlipidemia     Malignant tumor of cecum (HCC) 09/26/2013    Pneumonia 08/11/22    Sepsis without acute organ dysfunction (HCC) 02/04/2023    Shingles 2017   [1]   Social History  Socioeconomic History    Marital status: /Civil Union   Occupational History    Occupation: WORKING FULLTIME    Tobacco Use    Smoking status: Former     Current packs/day: 0.00     Average packs/day: 1 pack/day for 42.0 years (42.0 ttl pk-yrs)     Types: Cigarettes     Start date: 1967     Quit date: 1/1/2009     Years since  "quittin.4    Smokeless tobacco: Never   Vaping Use    Vaping status: Never Used   Substance and Sexual Activity    Alcohol use: Yes     Alcohol/week: 2.0 standard drinks of alcohol     Types: 2 Cans of beer per week     Comment: very rare \"beer here and there\"    Drug use: No    Sexual activity: Not Currently     Partners: Female     Birth control/protection: None     Social Drivers of Health     Financial Resource Strain: Low Risk  (5/15/2023)    Overall Financial Resource Strain (CARDIA)     Difficulty of Paying Living Expenses: Not hard at all   Food Insecurity: No Food Insecurity (2025)    Nursing - Inadequate Food Risk Classification     Worried About Running Out of Food in the Last Year: Never true     Ran Out of Food in the Last Year: Never true     Ran Out of Food in the Last Year: Never true   Transportation Needs: No Transportation Needs (2025)    Nursing - Transportation Risk Classification     Lack of Transportation: No   Intimate Partner Violence: Unknown (2025)    Nursing IPS     Physically Hurt by Someone: No     Hurt or Threatened by Someone: No   Housing Stability: Unknown (2025)    Nursing: Inadequate Housing Risk Classification     Unable to Pay for Housing in the Last Year: No     Has Housin     "

## 2025-05-26 NOTE — PLAN OF CARE
Problem: Potential for Falls  Goal: Patient will remain free of falls  Description: INTERVENTIONS:  - Educate patient/family on patient safety including physical limitations  - Instruct patient to call for assistance with activity   - Consider consulting OT/PT to assist with strengthening/mobility based on AM PAC & JH-HLM score  - Consult OT/PT to assist with strengthening/mobility   - Keep Call bell within reach  - Keep bed low and locked with side rails adjusted as appropriate  - Keep care items and personal belongings within reach  - Initiate and maintain comfort rounds  - Make Fall Risk Sign visible to staff  -- Apply yellow socks and bracelet for high fall risk patients  - Consider moving patient to room near nurses station  Outcome: Progressing     Problem: PAIN - ADULT  Goal: Verbalizes/displays adequate comfort level or baseline comfort level  Description: Interventions:  - Encourage patient to monitor pain and request assistance  - Assess pain using appropriate pain scale  - Administer analgesics as ordered based on type and severity of pain and evaluate response  - Implement non-pharmacological measures as appropriate and evaluate response  - Consider cultural and social influences on pain and pain management  - Notify physician/advanced practitioner if interventions unsuccessful or patient reports new pain  - Educate patient/family on pain management process including their role and importance of  reporting pain   - Provide non-pharmacologic/complimentary pain relief interventions  Outcome: Progressing     Problem: INFECTION - ADULT  Goal: Absence or prevention of progression during hospitalization  Description: INTERVENTIONS:  - Assess and monitor for signs and symptoms of infection  - Monitor lab/diagnostic results  - Monitor all insertion sites, i.e. indwelling lines, tubes, and drains  - Monitor endotracheal if appropriate and nasal secretions for changes in amount and color  - [place] appropriate  cooling/warming therapies per order  - Administer medications as ordered  - Instruct and encourage patient and family to use good hand hygiene technique  - Identify and instruct in appropriate isolation precautions for identified infection/condition  Outcome: Progressing  Goal: Absence of fever/infection during neutropenic period  Description: INTERVENTIONS:  - Monitor WBC  - Perform strict hand hygiene  - Limit to healthy visitors only  - No plants, dried, fresh or silk flowers with irwin in patient room  Outcome: Progressing     Problem: SAFETY ADULT  Goal: Patient will remain free of falls  Description: INTERVENTIONS:  - Educate patient/family on patient safety including physical limitations  - Instruct patient to call for assistance with activity   - Consider consulting OT/PT to assist with strengthening/mobility based on AM PAC & JH-HLM score  - Consult OT/PT to assist with strengthening/mobility   - Keep Call bell within reach  - Keep bed low and locked with side rails adjusted as appropriate  - Keep care items and personal belongings within reach  - Initiate and maintain comfort rounds  - Make Fall Risk Sign visible to staff  - Apply yellow socks and bracelet for high fall risk patients  - Consider moving patient to room near nurses station  Outcome: Progressing  Goal: Maintain or return to baseline ADL function  Description: INTERVENTIONS:  -  Assess patient's ability to carry out ADLs; assess patient's baseline for ADL function and identify physical deficits which impact ability to perform ADLs (bathing, care of mouth/teeth, toileting, grooming, dressing, etc.)  - Assess/evaluate cause of self-care deficits   - Assess range of motion  - Assess patient's mobility; develop plan if impaired  - Assess patient's need for assistive devices and provide as appropriate  - Encourage maximum independence but intervene and supervise when necessary  - Involve family in performance of ADLs  - Assess for home care needs  following discharge   - Consider OT consult to assist with ADL evaluation and planning for discharge  - Provide patient education as appropriate  - Monitor functional capacity and physical performance, use of AM PAC & JH-HLM   - Monitor gait, balance and fatigue with ambulation    Outcome: Progressing  Goal: Maintains/Returns to pre admission functional level  Description: INTERVENTIONS:  - Perform AM-PAC 6 Click Basic Mobility/ Daily Activity assessment daily.  - Set and communicate daily mobility goal to care team and patient/family/caregiver.   - Collaborate with rehabilitation services on mobility goals if consulted  -- Out of bed for toileting  - Record patient progress and toleration of activity level   Outcome: Progressing     Problem: DISCHARGE PLANNING  Goal: Discharge to home or other facility with appropriate resources  Description: INTERVENTIONS:  - Identify barriers to discharge w/patient and caregiver  - Arrange for needed discharge resources and transportation as appropriate  - Identify discharge learning needs (meds, wound care, etc.)  - Arrange for interpretive services to assist at discharge as needed  - Refer to Case Management Department for coordinating discharge planning if the patient needs post-hospital services based on physician/advanced practitioner order or complex needs related to functional status, cognitive ability, or social support system  Outcome: Progressing     Problem: Knowledge Deficit  Goal: Patient/family/caregiver demonstrates understanding of disease process, treatment plan, medications, and discharge instructions  Description: Complete learning assessment and assess knowledge base.  Interventions:  - Provide teaching at level of understanding  - Provide teaching via preferred learning methods  Outcome: Progressing

## 2025-05-26 NOTE — ASSESSMENT & PLAN NOTE
BNP-in 800's  Doesn't appear to volume overload   Continue home lasix  Recent echo reviewed - with preserved EF, dilated atria and AV gradient 11 mmHg

## 2025-05-26 NOTE — ASSESSMENT & PLAN NOTE
/66   Pulse 61   Temp 98.1 °F (36.7 °C)   Resp 20   SpO2 93%   Continue with Toprol- mg daily  Continue with Lasix 20 mg daily  Vitals as per protocol

## 2025-05-26 NOTE — ED PROVIDER NOTES
Time reflects when diagnosis was documented in both MDM as applicable and the Disposition within this note       Time User Action Codes Description Comment    5/26/2025 12:06 PM Ferdous, Komaira Add [J18.9] Pneumonia     5/26/2025 12:06 PM Ferdous, Komaira Add [R07.9] Chest pain     5/26/2025 12:06 PM Ferdous, Komaira Modify [R07.9] Chest pain     5/26/2025 12:06 PM Ferdous, Komaira Remove [J18.9] Pneumonia     5/26/2025 12:06 PM Ferdous, Komaira Remove [R07.9] Chest pain     5/26/2025 12:06 PM Ferdous, Komaira Add [J18.9] Multifocal pneumonia     5/26/2025 12:06 PM Ferdous, Komaira Add [R07.9] Chest pain     5/26/2025 12:07 PM FerFlorentino saraviamaira Add [C90.00] Multiple myeloma (HCC)           ED Disposition       ED Disposition   Admit    Condition   Stable    Date/Time   Mon May 26, 2025 12:06 PM    Comment   Case was discussed with Dr. Ziegler and the patient's admission status was agreed to be Admission Status: inpatient status to the service of Dr. Ziegler.               Assessment & Plan       Medical Decision Making  Blood work, EKG, chest x-ray  Give pain medication continue monitor patient for any worsening symptoms    Shortness of leukocytosis.  Patient continued to have some chest pressure after Tylenol was given.  Neb treatment started.  CT scan shows concern for worsening multifocal pneumonia.  Broad-spectrum antibiotics started.  At this time patient will be admitted for further evaluation management.  Patient agrees with admission plans.    Amount and/or Complexity of Data Reviewed  External Data Reviewed: labs and ECG.  Labs: ordered. Decision-making details documented in ED Course.  Radiology: ordered. Decision-making details documented in ED Course.  ECG/medicine tests: ordered and independent interpretation performed. Decision-making details documented in ED Course.    Risk  Prescription drug management.  Decision regarding hospitalization.             Medications   piperacillin-tazobactam (ZOSYN) IVPB 4.5  "g (has no administration in time range)   acetaminophen (Ofirmev) injection 1,000 mg (0 mg Intravenous Stopped 5/26/25 8166)   potassium chloride (Klor-Con M20) CR tablet 40 mEq (40 mEq Oral Given 5/26/25 1059)   ipratropium (ATROVENT) 0.02 % inhalation solution 0.5 mg (0.5 mg Nebulization Given 5/26/25 1100)   albuterol inhalation solution 5 mg (5 mg Nebulization Given 5/26/25 1100)       ED Risk Strat Scores                    No data recorded        SBIRT 20yo+      Flowsheet Row Most Recent Value   Initial Alcohol Screen: US AUDIT-C     1. How often do you have a drink containing alcohol? 0 Filed at: 05/26/2025 0705   2. How many drinks containing alcohol do you have on a typical day you are drinking?  0 Filed at: 05/26/2025 0705   3a. Male UNDER 65: How often do you have five or more drinks on one occasion? 0 Filed at: 05/26/2025 0705   3b. FEMALE Any Age, or MALE 65+: How often do you have 4 or more drinks on one occassion? 0 Filed at: 05/26/2025 0705   Audit-C Score 0 Filed at: 05/26/2025 0705   BETO: How many times in the past year have you...    Used an illegal drug or used a prescription medication for non-medical reasons? Never Filed at: 05/26/2025 0705                            History of Present Illness       Chief Complaint   Patient presents with    Shortness of Breath     Pt reports feeling short of breath and \"an elephant is on my chest\" Pt reports feeling improved but still there       Past Medical History[1]   Past Surgical History[2]   Family History[3]   Social History[4]   E-Cigarette/Vaping    E-Cigarette Use Never User       E-Cigarette/Vaping Substances    Nicotine No     THC No     CBD No     Flavoring No     Other No     Unknown No       I have reviewed and agree with the history as documented.     79-year-old male with past history of multiple myeloma currently undergoing maintenance chemotherapy as well as IVIG, type 2 diabetes, recurrent pneumonia, malignant tumor of cecum, presents to " the ED for evaluation of left-sided chest pain with intermittent shortness of breath since last night.  Patient felt like an elephant was sitting on his chest.  Symptoms continued throughout the morning today.  Patient was visiting a friend out of town however he came back to his house and came to the the hospital for further evaluation management.  Patient came back here as all of his care is established here.      History provided by:  Patient  Shortness of Breath  Associated symptoms: chest pain    Associated symptoms: no abdominal pain, no cough, no ear pain, no fever, no rash, no sore throat and no vomiting        Review of Systems   Constitutional:  Negative for chills and fever.   HENT:  Negative for ear pain and sore throat.    Eyes:  Negative for pain and visual disturbance.   Respiratory:  Positive for shortness of breath. Negative for cough.    Cardiovascular:  Positive for chest pain. Negative for palpitations.   Gastrointestinal:  Negative for abdominal pain and vomiting.   Genitourinary:  Negative for dysuria and hematuria.   Musculoskeletal:  Negative for arthralgias and back pain.   Skin:  Negative for color change and rash.   Neurological:  Negative for seizures and syncope.   All other systems reviewed and are negative.          Objective       ED Triage Vitals [05/26/25 0705]   Temperature Pulse Blood Pressure Respirations SpO2 Patient Position - Orthostatic VS   97.9 °F (36.6 °C) 72 138/58 18 97 % Sitting      Temp Source Heart Rate Source BP Location FiO2 (%) Pain Score    Temporal Monitor Right arm -- 3      Vitals      Date and Time Temp Pulse SpO2 Resp BP Pain Score FACES Pain Rating User   05/26/25 1130 -- 64 100 % 20 151/69 -- --    05/26/25 1100 -- 62 96 % 20 149/63 -- --    05/26/25 0945 -- 63 94 % 20 145/63 -- --    05/26/25 0830 -- 62 93 % 19 145/67 -- --    05/26/25 0705 97.9 °F (36.6 °C) 72 97 % 18 138/58 3 -- CM            Physical Exam  Vitals and nursing note reviewed.    Constitutional:       General: He is not in acute distress.     Appearance: He is well-developed.   HENT:      Head: Normocephalic and atraumatic.     Eyes:      Conjunctiva/sclera: Conjunctivae normal.       Cardiovascular:      Rate and Rhythm: Normal rate and regular rhythm.      Heart sounds: No murmur heard.  Pulmonary:      Effort: Pulmonary effort is normal. No respiratory distress.      Breath sounds: Normal breath sounds.      Comments: Decreased breath sounds noted bilaterally however no audible wheezing or rales noted.  Abdominal:      Palpations: Abdomen is soft.      Tenderness: There is no abdominal tenderness.     Musculoskeletal:         General: No swelling.      Cervical back: Neck supple.     Skin:     General: Skin is warm and dry.      Capillary Refill: Capillary refill takes less than 2 seconds.     Neurological:      Mental Status: He is alert.     Psychiatric:         Mood and Affect: Mood normal.         Results Reviewed       Procedure Component Value Units Date/Time    HS Troponin I 4hr [[id number]]  (Normal) Collected: 05/26/25 1104    Lab Status: Final result Specimen: Blood from Arm, Right Updated: 05/26/25 1133     hs TnI 4hr 19 ng/L      Delta 4hr hsTnI -1 ng/L     B-Type Natriuretic Peptide(BNP) [[id number]]  (Abnormal) Collected: 05/26/25 0718    Lab Status: Final result Specimen: Blood from Arm, Right Updated: 05/26/25 1118      pg/mL     HS Troponin I 2hr [[id number]]  (Normal) Collected: 05/26/25 0939    Lab Status: Final result Specimen: Blood from Arm, Right Updated: 05/26/25 1009     hs TnI 2hr 19 ng/L      Delta 2hr hsTnI -1 ng/L     UA w Reflex to Microscopic w Reflex to Culture [[id number]]  (Abnormal) Collected: 05/26/25 0930    Lab Status: Final result Specimen: Urine, Clean Catch Updated: 05/26/25 1001     Color, UA Light Yellow     Clarity, UA Clear     Specific Gravity, UA <1.005     pH, UA 5.5     Leukocytes, UA Negative     Nitrite, UA Negative     Protein, UA  Negative mg/dl      Glucose,  (3/10%) mg/dl      Ketones, UA Negative mg/dl      Urobilinogen, UA <2.0 mg/dl      Bilirubin, UA Negative     Occult Blood, UA Negative    HS Troponin 0hr (reflex protocol) [[id number]]  (Normal) Collected: 05/26/25 0718    Lab Status: Final result Specimen: Blood from Arm, Right Updated: 05/26/25 0807     hs TnI 0hr 20 ng/L     Comprehensive metabolic panel [[id number]]  (Abnormal) Collected: 05/26/25 0718    Lab Status: Final result Specimen: Blood from Arm, Right Updated: 05/26/25 0805     Sodium 140 mmol/L      Potassium 3.4 mmol/L      Chloride 104 mmol/L      CO2 28 mmol/L      ANION GAP 8 mmol/L      BUN 15 mg/dL      Creatinine 0.83 mg/dL      Glucose 118 mg/dL      Calcium 8.8 mg/dL      AST 18 U/L      ALT 17 U/L      Alkaline Phosphatase 122 U/L      Total Protein 6.4 g/dL      Albumin 3.9 g/dL      Total Bilirubin 0.60 mg/dL      eGFR 84 ml/min/1.73sq m     Narrative:      National Kid[employer] Foundation guidelines for Chronic Kidney Disease (CKD):     Stage 1 with normal or high GFR (GFR > 90 mL/min/1.73 square meters)    Stage 2 Mild CKD (GFR = 60-89 mL/min/1.73 square meters)    Stage 3A Moderate CKD (GFR = 45-59 mL/min/1.73 square meters)    Stage 3B Moderate CKD (GFR = 30-44 mL/min/1.73 square meters)    Stage 4 Severe CKD (GFR = 15-29 mL/min/1.73 square meters)    Stage 5 End Stage CKD (GFR <15 mL/min/1.73 square meters)  Note: GFR calculation is accurate only with a steady state creatinine    Magnesium [[id number]]  (Normal) Collected: 05/26/25 0718    Lab Status: Final result Specimen: Blood from Arm, Right Updated: 05/26/25 0805     Magnesium 2.0 mg/dL     Protime-INR [[id number]]  (Abnormal) Collected: 05/26/25 0718    Lab Status: Final result Specimen: Blood from Arm, Right Updated: 05/26/25 0802     Protime 15.1 seconds      INR 1.13    Narrative:      INR Therapeutic Range    Indication                                             INR Range      Atrial  Fibrillation                                               2.0-3.0  Hypercoagulable State                                    2.0.2.3  Left Ventricular Asist Device                            2.0-3.0  Mechanical Heart Valve                                  -    Aortic(with afib, MI, embolism, HF, LA enlargement,    and/or coagulopathy)                                     2.0-3.0 (2.5-3.5)     Mitral                                                             2.5-3.5  Prosthetic/Bioprosthetic Heart Valve               2.0-3.0  Venous thromboembolism (VTE: VT, PE        2.0-3.0    APTT [[id number]]  (Normal) Collected: 05/26/25 0718    Lab Status: Final result Specimen: Blood from Arm, Right Updated: 05/26/25 0802     PTT 30 seconds     D-Dimer [[id number]]  (Normal) Collected: 05/26/25 0718    Lab Status: Final result Specimen: Blood from Arm, Right Updated: 05/26/25 0801     D-Dimer, Quant <0.27 ug/ml FEU     Narrative:      In the evaluation for possible pulmonary embolism, in the appropriate (Well's Score of 4 or less) patient, the age adjusted d-dimer cutoff for this patient can be calculated as:    Age x 0.01 (in ug/mL) for Age-adjusted D-dimer exclusion threshold for a patient over 50 years.    CBC and differential [[id number]]  (Abnormal) Collected: 05/26/25 0718    Lab Status: Final result Specimen: Blood from Arm, Right Updated: 05/26/25 0738     WBC 14.13 Thousand/uL      RBC 4.76 Million/uL      Hemoglobin 12.3 g/dL      Hematocrit 40.3 %      MCV 85 fL      MCH 25.8 pg      MCHC 30.5 g/dL      RDW 16.7 %      MPV 9.9 fL      Platelets 222 Thousands/uL      nRBC 0 /100 WBCs      Segmented % 77 %      Immature Grans % 1 %      Lymphocytes % 12 %      Monocytes % 9 %      Eosinophils Relative 0 %      Basophils Relative 1 %      Absolute Neutrophils 11.05 Thousands/µL      Absolute Immature Grans 0.09 Thousand/uL      Absolute Lymphocytes 1.65 Thousands/µL      Absolute Monocytes 1.21 Thousand/µL       Eosinophils Absolute 0.05 Thousand/µL      Basophils Absolute 0.08 Thousands/µL             CT chest without contrast   Final Interpretation by Bradley Landon Kocher, MD (05/26 1055)      New left upper and lower lobe tree-in-bud nodularity) opacities, suspicious for pneumonia superimposed on chronic left upper and lower lobe consolidation. Waxing and waning nature could suggest aspiration as an etiology. Short interval follow-up CT in 3    should be considered to ensure resolution and exclude an underlying mass      Small bilateral pleural effusions.      Mildly enlarged mediastinal lymph nodes, likely reactive.      The study was marked in EPIC for immediate notification.      Resident: Jairo Houston I, the attending radiologist, have reviewed the images and agree with the final report above.      Workstation performed: [id number]         XR chest 2 views    (Results Pending)       ECG 12 Lead Documentation Only    Date/Time: 5/26/2025 7:08 AM    Performed by: Cristina Card DO  Authorized by: Cristina Card DO    Indications / Diagnosis:  Shortness of breath  ECG reviewed by me, the ED Provider: yes    Patient location:  ED  Previous ECG:     Previous ECG:  Compared to current    Similarity:  Changes noted    Comparison to cardiac monitor: Yes    Interpretation:     Interpretation: abnormal    Comments:      Sinus rhythm, rate 73, left bundle branch block pattern noted that does not meet Sgarbossa criteria for STEMI, right axis deviation noted, essentially unchanged EKG from previous study.      ED Medication and Procedure Management   Prior to Admission Medications   Prescriptions Last Dose Informant Patient Reported? Taking?   Coenzyme Q10 (CoQ-10) 100 MG capsule  Self Yes No   IMMUNE GLOBULIN, HUMAN, IV  Self Yes No   Sig: Inject 30,000 mg into a catheter in a vein   Jardiance 25 MG TABS   No No   Sig: TAKE 1 TABLET BY MOUTH EVERY MORNING   aspirin 81 MG tablet  Self Yes No   Sig: Take 1 tablet (81  mg total) by mouth daily   atorvastatin (LIPITOR) 40 mg tablet  Self No No   Sig: TAKE 1 TABLET BY MOUTH EVERY DAY   cholecalciferol (VITAMIN D3) 1,000 units tablet  Self No No   Sig: Take 2 tablets (2,000 Units total) by mouth daily   dicyclomine (BENTYL) 20 mg tablet  Self No No   Sig: Take 1 tablet (20 mg total) by mouth 4 (four) times a day as needed (Abdominal cramping or pain) for up to 5 days   ferrous sulfate 324 (65 Fe) mg  Self No No   Sig: TAKE 1 TABLET BY MOUTH EVERY DAY WITH BREAKFAST   furosemide (LASIX) 20 mg tablet  Self No No   Sig: Take 1 tablet (20 mg total) by mouth daily   glucose blood test strip  Self Yes No   metoprolol succinate (TOPROL-XL) 100 mg 24 hr tablet  Self No No   Sig: TAKE 1 TABLET BY MOUTH EVERY DAY   montelukast (SINGULAIR) 10 mg tablet   No No   Sig: TAKE 1 TABLET BY MOUTH EVERY DAY   multivitamin-minerals (CENTRUM ADULTS) tablet  Self No No   Sig: Take 1 tablet by mouth daily   omeprazole (PriLOSEC) 40 MG capsule   No No   Sig: Take 1 capsule (40 mg total) by mouth 2 (two) times a day   ondansetron (ZOFRAN-ODT) 4 mg disintegrating tablet   No No   Sig: Take 1 tablet (4 mg total) by mouth every 6 (six) hours as needed for nausea or vomiting   valACYclovir (VALTREX) 500 mg tablet  Self Yes No   Sig: Take 500 mg by mouth daily      Facility-Administered Medications: None     Patient's Medications   Discharge Prescriptions    No medications on file     No discharge procedures on file.  ED SEPSIS DOCUMENTATION   Time reflects when diagnosis was documented in both MDM as applicable and the Disposition within this note       Time User Action Codes Description Comment    5/26/2025 12:06 PM Cristina Card Add [J18.9] Pneumonia     5/26/2025 12:06 PM Cristina Card Add [R07.9] Chest pain     5/26/2025 12:06 PM Cristina Card Modify [R07.9] Chest pain     5/26/2025 12:06 PM Cristina Card Remove [J18.9] Pneumonia     5/26/2025 12:06 PM Cristina Card Remove [R07.9] Chest pain      5/26/2025 12:06 PM Aniyah Cardlashay Add [J18.9] Multifocal pneumonia     5/26/2025 12:06 PM Aniyah Cardlashay Add [R07.9] Chest pain     5/26/2025 12:07 PM Aniyah Cardlashay Add [C90.00] Multiple myeloma (HCC)                    [1]   Past Medical History:  Diagnosis Date    Acute on chronic systolic (congestive) heart failure (HCC) 05/15/2024    Acute respiratory failure with hypoxia (HCC) 04/20/2022    Cataract     Colitis     Colon polyp     Fatty liver     History of chemotherapy     History of radiation therapy     History of shingles 2018    History of transfusion     Hyperlipidemia     Malignant tumor of cecum (HCC) 09/26/2013    Pneumonia 08/11/22    Sepsis without acute organ dysfunction (HCC) 02/04/2023    Shingles 2017   [2]   Past Surgical History:  Procedure Laterality Date    AORTIC VALVE REPLACEMENT      CARDIAC VALVE REPLACEMENT  2014    aorta    CATARACT EXTRACTION Bilateral     COLECTOMY      COLONOSCOPY  11/2019    Prior right hemicolectomy    CORONARY ARTERY BYPASS GRAFT      DENTAL SURGERY      JOINT REPLACEMENT  January 2019    left knee    KNEE SURGERY      LYMPH NODE BIOPSY  2003    LYMPHADENECTOMY      OTHER SURGICAL HISTORY      MAZE PROCEDURE    MT ARTHRP KNE CONDYLE&PLATU MEDIAL&LAT COMPARTMENTS Left 01/28/2019    Procedure: ARTHROPLASTY LEFT KNEE TOTAL;  Surgeon: Darell Delgado MD;  Location:  MAIN OR;  Service: Orthopedics    MT LARYNGOSCOPY DIRECT OPERATIVE W/BIOPSY Right 04/27/2022    Procedure: DIRECT LARYNGOSCOPY WITH BIOPSY RIGHT PHARYNX, POSSIBLE RIGHT NECK LYMPH NODE BIOPSY; FROZEN SECTION;  Surgeon: Kevin Hardin MD;  Location:  MAIN OR;  Service: ENT    SKIN BIOPSY      UPPER GASTROINTESTINAL ENDOSCOPY  11/2019    Schatzki ring    US GUIDED LYMPH NODE BIOPSY RIGHT  12/14/2021   [3]   Family History  Problem Relation Name Age of Onset    Heart block Family      Coronary artery disease Mother mother     Thrombosis Mother mother     Hypertension Mother mother     Arthritis  "Mother mother     Hyperlipidemia Mother mother     Diabetes Father Macario Galicia.     Hypertension Father Macario Galicia.     Arthritis Father Macario Galicia.     Sudden death Father Macario Galicia.         cardiac    Diabetes type II Father Macario Galicia.     Colon cancer Paternal Grandfather Macario Pickard Sr     Cancer Paternal Grandfather Macario Pickard Sr     Breast cancer Sister      Heart disease Brother          Coronary stents   [4]   Social History  Tobacco Use    Smoking status: Former     Current packs/day: 0.00     Average packs/day: 1 pack/day for 42.0 years (42.0 ttl pk-yrs)     Types: Cigarettes     Start date:      Quit date: 2009     Years since quittin.4    Smokeless tobacco: Never   Vaping Use    Vaping status: Never Used   Substance Use Topics    Alcohol use: Yes     Alcohol/week: 2.0 standard drinks of alcohol     Types: 2 Cans of beer per week     Comment: very rare \"beer here and there\"    Drug use: No        Cristina Card DO  [phone]    "

## 2025-05-26 NOTE — PROGRESS NOTES
Gabe Pickard is a 79 y.o. male who is currently ordered Vancomycin IV with management by the Pharmacy Consult service.  Relevant clinical data and objective / subjective history reviewed.  Vancomycin Assessment:  Indication and Goal AUC/Trough:  -600, trough > 10  Clinical Status: initial dosing  Micro:     Renal Function:  SCr: 0.83 mg/dL  CrCl: 69.9 mL/min  Renal replacement: Not on dialysis  Days of Therapy: 1  Current Dose:  1750 mg once  Vancomycin Plan:  New Dosin mg Q 12 H  Estimated AUC: 444 mcg*hr/mL  Estimated Trough: 12.6 mcg/mL  Next Level: 25 @ 0430 with AM labs  Renal Function Monitoring: Daily BMP and UOP  Pharmacy will continue to follow closely for s/sx of nephrotoxicity, infusion reactions and appropriateness of therapy.  BMP and CBC will be ordered per protocol. We will continue to follow the patient’s culture results and clinical progress daily.    Chris Mckeon, Pharmacist

## 2025-05-26 NOTE — H&P
H&P - Hospitalist   Name: Gabe Pickard 79 y.o. male I MRN: [id number]  Unit/Bed#: -01 I Date of Admission: 5/26/2025   Date of Service: 5/26/2025 I Hospital Day: 0     Assessment & Plan  Multifocal pneumonia  Patient with recurrent multifocal pneumonia because of dysphagia and with prior radiation therapy  Immunosuppressed secondary to IVIG transfusions, on maintenance chemotherapy for multiple myeloma.  Also history noted for prior lymphoma and colon cancer status post resection    CT chest-New left upper and lower lobe tree-in-bud nodularity) opacities, suspicious for pneumonia superimposed on chronic left upper and lower lobe consolidation     Continue with IV Zosyn and IV vancomycin  Check urine antigens  Follow sputum culture/Gram stain  Follow-up MRSA, if negative discontinue IV vancomycin  Speech evaluation  Aspiration precautions  Inpatient consult to pulmonology, appreciate recommendations  Dyslipidemia  Continue with atorvastatin 40 mg daily at bedtime  Continue with coenzyme Q 1000 mg daily  Lymphoma (HCC)  Under surveillance  Hx of CABG  Continue with aspirin, statin and beta-blocker  Essential hypertension  /66   Pulse 61   Temp 98.1 °F (36.7 °C)   Resp 20   SpO2 93%   Continue with Toprol- mg daily  Continue with Lasix 20 mg daily  Vitals as per protocol  Leukocytosis  Recent Labs     05/26/25  0718   WBC 14.13*      In the setting of multifocal pneumonia  Treatment as above  Continue to monitor  Gastroesophageal reflux disease with esophagitis  Continue with Protonix  Hypokalemia  Recent Labs     05/26/25  0718   K 3.4*   MG 2.0      Monitor and replete as needed   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  Dysphagia  Secondary to radiation therapy  Speech evaluation  Aspiration precautions  Elevated brain natriuretic peptide (BNP) level  BNP-in 800's  Doesn't appear to volume overload   Continue home lasix  Recent echo reviewed - with preserved EF,  "dilated atria and AV gradient 11 mmHg     Chest pain  [place] no cardiac  C/o chest tightness  Associates it with pneumonia, experiences same kind of chest pressure with every pneumonia episode  Trop- negative   EKG reviewed       VTE Pharmacologic Prophylaxis:   Moderate Risk (Score 3-4) - Pharmacological DVT Prophylaxis Ordered: enoxaparin (Lovenox).  Code Status: Level 1 - Full Code patient  Discussion with family: Updated  (wife) at bedside.    Anticipated Length of Stay: Patient will be admitted on an inpatient basis with an anticipated length of stay of greater than 2 midnights secondary to pneumonia .    History of Present Illness   Chief Complaint:   Chief Complaint   Patient presents with    Shortness of Breath     Pt reports feeling short of breath and \"an elephant is on my chest\" Pt reports feeling improved but still there         Gabe Pickard is a 79 y.o. male with a PMH of hypertension, hyperlipidemia, CAD, status post CABG, multiple myeloma, history of lymphoma, history of colon cancer presented with chest tightness and recurrent multifocal pneumonia.  Comfortable on room air.  Other vitals are stable as well.  He can formulate 1 full CODE STATUS.  Denies any sick contacts.  Will be admitted under [facility] for further management and care    Review of Systems   Constitutional:  Positive for activity change. Negative for appetite change and fatigue.   HENT:  Negative for congestion.    Respiratory:  Positive for cough, chest tightness and shortness of breath.    Cardiovascular:  Positive for chest pain. Negative for palpitations and leg swelling.   Gastrointestinal:  Negative for abdominal pain.   Genitourinary:  Negative for dysuria and urgency.   Musculoskeletal:  Negative for arthralgias and back pain.   Neurological:  Negative for weakness.   Psychiatric/Behavioral:  Negative for agitation and confusion.        Historical Information   Past Medical History[1]  Past Surgical " History[2]  Social History[3]  E-Cigarette/Vaping    E-Cigarette Use Never User      E-Cigarette/Vaping Substances    Nicotine No     THC No     CBD No     Flavoring No     Other No     Unknown No      Family History[4]  Social History:  Marital Status: /Civil Union       Meds/Allergies   I have reviewed home medications with patient personally.  Prior to Admission medications    Medication Sig Start Date End Date Taking? Authorizing Provider   aspirin 81 MG tablet Take 81 mg by mouth in the morning. 3/29/19  Yes Heladio Garcia MD   atorvastatin (LIPITOR) 40 mg tablet TAKE 1 TABLET BY MOUTH EVERY DAY 1/6/25  Yes Heladio Garcia MD   cholecalciferol (VITAMIN D3) 1,000 units tablet Take 2 tablets (2,000 Units total) by mouth daily 4/11/21  Yes Nikia Perez DO   Coenzyme Q10 (CoQ-10) 100 MG capsule    Yes Historical Provider, MD   ferrous sulfate 324 (65 Fe) mg TAKE 1 TABLET BY MOUTH EVERY DAY WITH BREAKFAST 3/23/24  Yes Radha Pettit DO   furosemide (LASIX) 20 mg tablet Take 1 tablet (20 mg total) by mouth daily 11/25/24  Yes Radha Pettit DO   IMMUNE GLOBULIN, HUMAN, IV Inject 30,000 mg into a catheter in a vein   Yes Historical Provider, MD   Jardiance 25 MG TABS TAKE 1 TABLET BY MOUTH EVERY MORNING 4/2/25  Yes Lucita La MD   metoprolol succinate (TOPROL-XL) 100 mg 24 hr tablet TAKE 1 TABLET BY MOUTH EVERY DAY 2/15/25  Yes Radha Pettit DO   montelukast (SINGULAIR) 10 mg tablet TAKE 1 TABLET BY MOUTH EVERY DAY 4/18/25  Yes Radha Pettit DO   multivitamin-minerals (CENTRUM ADULTS) tablet Take 1 tablet by mouth daily 4/13/21  Yes Nikia Perez DO   omeprazole (PriLOSEC) 40 MG capsule Take 1 capsule (40 mg total) by mouth 2 (two) times a day 5/1/25  Yes Radha Pettit DO   ondansetron (ZOFRAN-ODT) 4 mg disintegrating tablet Take 1 tablet (4 mg total) by mouth every 6 (six) hours as needed for nausea or vomiting 4/14/25  Yes Gabino Dash MD   valACYclovir (VALTREX) 500  mg tablet Take 500 mg by mouth in the morning. 5/27/22  Yes Historical Provider, MD   dicyclomine (BENTYL) 20 mg tablet Take 1 tablet (20 mg total) by mouth 4 (four) times a day as needed (Abdominal cramping or pain) for up to 5 days 2/14/25 3/17/25  DUNG Jacobo   glucose blood test strip     Historical Provider, MD     Allergies   Allergen Reactions    Ceftin [Cefuroxime] Itching     However, has tolerated Cefazolin and Pip-Tazo since, which have different side chains. Be cautious with / avoid 2nd, 3rd, or 4th Gen Cephs that have similar side chains to Cefuroxime.    Lantus [Insulin Glargine] Itching       Objective :  Temp:  [97.9 °F (36.6 °C)-98.1 °F (36.7 °C)] 98.1 °F (36.7 °C)  HR:  [61-72] 61  BP: (137-151)/(58-69) 137/66  Resp:  [18-20] 20  SpO2:  [93 %-100 %] 93 %  O2 Device: None (Room air)    Physical Exam  Vitals reviewed.   Constitutional:       Comments: malnourished   HENT:      Head: Atraumatic.      Mouth/Throat:      Mouth: Mucous membranes are dry.     Eyes:      General: No scleral icterus.      Cardiovascular:      Rate and Rhythm: Normal rate.      Heart sounds: Normal heart sounds.   Pulmonary:      Effort: No respiratory distress.      Breath sounds: No wheezing.   Abdominal:      General: There is no distension.     Musculoskeletal:      Right lower leg: No edema.      Left lower leg: No edema.     Skin:     General: Skin is dry.      Findings: Bruising present.     Neurological:      Mental Status: He is alert and oriented to person, place, and time.     Psychiatric:         Mood and Affect: Mood normal.          Lines/Drains:            Lab Results: I have reviewed the following results:  Results from last 7 days   Lab Units 05/26/25  0718   WBC Thousand/uL 14.13*   HEMOGLOBIN g/dL 12.3   HEMATOCRIT % 40.3   PLATELETS Thousands/uL 222   SEGS PCT % 77*   LYMPHO PCT % 12*   MONO PCT % 9   EOS PCT % 0     Results from last 7 days   Lab Units 05/26/25  0718   SODIUM mmol/L 140    POTASSIUM mmol/L 3.4*   CHLORIDE mmol/L 104   CO2 mmol/L 28   BUN mg/dL 15   CREATININE mg/dL 0.83   ANION GAP mmol/L 8   CALCIUM mg/dL 8.8   ALBUMIN g/dL 3.9   TOTAL BILIRUBIN mg/dL 0.60   ALK PHOS U/L 122*   ALT U/L 17   AST U/L 18   GLUCOSE RANDOM mg/dL 118     Results from last 7 days   Lab Units 05/26/25  0718   INR  1.13         Lab Results   Component Value Date    HGBA1C 6.2 (H) 03/06/2025    HGBA1C 5.8 (H) 08/26/2024    HGBA1C 5.8 (H) 06/12/2024     Results from last 7 days   Lab Units 05/26/25  1104   PROCALCITONIN ng/ml 0.12       Imaging Results Review: I reviewed radiology reports from this admission including: chest xray and CT chest.  Other Study Results Review: EKG was reviewed.     Administrative Statements   I have spent a total time of 65 minutes in caring for this patient on the day of the visit/encounter including Patient and family education, Importance of tx compliance, Reviewing/placing orders in the medical record (including tests, medications, and/or procedures), Obtaining or reviewing history  , and Communicating with other healthcare professionals .    ** Please Note: This note has been constructed using a voice recognition system. **         [1]   Past Medical History:  Diagnosis Date    Acute on chronic systolic (congestive) heart failure (HCC) 05/15/2024    Acute respiratory failure with hypoxia (HCC) 04/20/2022    Cataract     Colitis     Colon polyp     Fatty liver     History of chemotherapy     History of radiation therapy     History of shingles 2018    History of transfusion     Hyperlipidemia     Malignant tumor of cecum (HCC) 09/26/2013    Pneumonia 08/11/22    Sepsis without acute organ dysfunction (HCC) 02/04/2023    Shingles 2017   [2]   Past Surgical History:  Procedure Laterality Date    AORTIC VALVE REPLACEMENT      CARDIAC VALVE REPLACEMENT  2014    aorta    CATARACT EXTRACTION Bilateral     COLECTOMY      COLONOSCOPY  11/2019    Prior right hemicolectomy    CORONARY  "ARTERY BYPASS GRAFT      DENTAL SURGERY      JOINT REPLACEMENT  2019    left knee    KNEE SURGERY      LYMPH NODE BIOPSY  2003    LYMPHADENECTOMY      OTHER SURGICAL HISTORY      MAZE PROCEDURE    SC ARTHRP KNE CONDYLE&PLATU MEDIAL&LAT COMPARTMENTS Left 2019    Procedure: ARTHROPLASTY LEFT KNEE TOTAL;  Surgeon: Darell Delgado MD;  Location:  MAIN OR;  Service: Orthopedics    SC LARYNGOSCOPY DIRECT OPERATIVE W/BIOPSY Right 2022    Procedure: DIRECT LARYNGOSCOPY WITH BIOPSY RIGHT PHARYNX, POSSIBLE RIGHT NECK LYMPH NODE BIOPSY; FROZEN SECTION;  Surgeon: Kevin Hardin MD;  Location:  MAIN OR;  Service: ENT    SKIN BIOPSY      UPPER GASTROINTESTINAL ENDOSCOPY  2019    Schatzki ring    US GUIDED LYMPH NODE BIOPSY RIGHT  2021   [3]   Social History  Tobacco Use    Smoking status: Former     Current packs/day: 0.00     Average packs/day: 1 pack/day for 42.0 years (42.0 ttl pk-yrs)     Types: Cigarettes     Start date:      Quit date: 2009     Years since quittin.4    Smokeless tobacco: Never   Vaping Use    Vaping status: Never Used   Substance and Sexual Activity    Alcohol use: Yes     Alcohol/week: 2.0 standard drinks of alcohol     Types: 2 Cans of beer per week     Comment: very rare \"beer here and there\"    Drug use: No    Sexual activity: Not Currently     Partners: Female     Birth control/protection: None   [4]   Family History  Problem Relation Name Age of Onset    Heart block Family      Coronary artery disease Mother mother     Thrombosis Mother mother     Hypertension Mother mother     Arthritis Mother mother     Hyperlipidemia Mother mother     Diabetes Father Macario Jr.     Hypertension Father Macario Jr.     Arthritis Father Macario Jr.     Sudden death Father [place] Jr.         cardiac    Diabetes type II Father [place] Jr.     Colon cancer Paternal Grandfather Macario Pickard Sr     Cancer Paternal Grandfather Macario Pickard Sr     Breast cancer Sister      " Heart disease Brother          Coronary stents

## 2025-05-26 NOTE — ASSESSMENT & PLAN NOTE
Patient with recurrent multifocal pneumonia because of dysphagia and with prior radiation therapy  Immunosuppressed secondary to IVIG transfusions, on maintenance chemotherapy for multiple myeloma.  Also history noted for prior lymphoma and colon cancer status post resection    CT chest-New left upper and lower lobe tree-in-bud nodularity) opacities, suspicious for pneumonia superimposed on chronic left upper and lower lobe consolidation     Continue with IV Zosyn and IV vancomycin  Check urine antigens  Follow sputum culture/Gram stain  Follow-up MRSA, if negative discontinue IV vancomycin  Speech evaluation  Aspiration precautions  Inpatient consult to pulmonology, appreciate recommendations

## 2025-05-27 VITALS
SYSTOLIC BLOOD PRESSURE: 174 MMHG | HEART RATE: 76 BPM | TEMPERATURE: 97.8 F | OXYGEN SATURATION: 95 % | RESPIRATION RATE: 15 BRPM | DIASTOLIC BLOOD PRESSURE: 94 MMHG

## 2025-05-27 LAB
ANION GAP SERPL CALCULATED.3IONS-SCNC: 8 MMOL/L (ref 4–13)
BASOPHILS # BLD AUTO: 0.08 THOUSANDS/ÂΜL (ref 0–0.1)
BASOPHILS NFR BLD AUTO: 1 % (ref 0–1)
BUN SERPL-MCNC: 12 MG/DL (ref 5–25)
CALCIUM SERPL-MCNC: 8.6 MG/DL (ref 8.4–10.2)
CHLORIDE SERPL-SCNC: 106 MMOL/L (ref 96–108)
CO2 SERPL-SCNC: 27 MMOL/L (ref 21–32)
CREAT SERPL-MCNC: 0.72 MG/DL (ref 0.6–1.3)
EOSINOPHIL # BLD AUTO: 0.13 THOUSAND/ÂΜL (ref 0–0.61)
EOSINOPHIL NFR BLD AUTO: 2 % (ref 0–6)
ERYTHROCYTE [DISTWIDTH] IN BLOOD BY AUTOMATED COUNT: 17.2 % (ref 11.6–15.1)
GFR SERPL CREATININE-BSD FRML MDRD: 89 ML/MIN/1.73SQ M
GLUCOSE SERPL-MCNC: 112 MG/DL (ref 65–140)
HCT VFR BLD AUTO: 39.5 % (ref 36.5–49.3)
HGB BLD-MCNC: 12.6 G/DL (ref 12–17)
IMM GRANULOCYTES # BLD AUTO: 0.03 THOUSAND/UL (ref 0–0.2)
IMM GRANULOCYTES NFR BLD AUTO: 0 % (ref 0–2)
L PNEUMO1 AG UR QL IA.RAPID: NEGATIVE
LYMPHOCYTES # BLD AUTO: 1.03 THOUSANDS/ÂΜL (ref 0.6–4.47)
LYMPHOCYTES NFR BLD AUTO: 12 % (ref 14–44)
MAGNESIUM SERPL-MCNC: 2 MG/DL (ref 1.9–2.7)
MCH RBC QN AUTO: 27.2 PG (ref 26.8–34.3)
MCHC RBC AUTO-ENTMCNC: 31.9 G/DL (ref 31.4–37.4)
MCV RBC AUTO: 85 FL (ref 82–98)
MONOCYTES # BLD AUTO: 0.82 THOUSAND/ÂΜL (ref 0.17–1.22)
MONOCYTES NFR BLD AUTO: 10 % (ref 4–12)
NEUTROPHILS # BLD AUTO: 6.29 THOUSANDS/ÂΜL (ref 1.85–7.62)
NEUTS SEG NFR BLD AUTO: 75 % (ref 43–75)
NRBC BLD AUTO-RTO: 0 /100 WBCS
PLATELET # BLD AUTO: 184 THOUSANDS/UL (ref 149–390)
PMV BLD AUTO: 9.8 FL (ref 8.9–12.7)
POTASSIUM SERPL-SCNC: 3.6 MMOL/L (ref 3.5–5.3)
PROCALCITONIN SERPL-MCNC: 0.1 NG/ML
RBC # BLD AUTO: 4.63 MILLION/UL (ref 3.88–5.62)
S PNEUM AG UR QL: NEGATIVE
SODIUM SERPL-SCNC: 141 MMOL/L (ref 135–147)
WBC # BLD AUTO: 8.38 THOUSAND/UL (ref 4.31–10.16)

## 2025-05-27 PROCEDURE — 99223 1ST HOSP IP/OBS HIGH 75: CPT | Performed by: INTERNAL MEDICINE

## 2025-05-27 PROCEDURE — 85025 COMPLETE CBC W/AUTO DIFF WBC: CPT | Performed by: INTERNAL MEDICINE

## 2025-05-27 PROCEDURE — 99239 HOSP IP/OBS DSCHRG MGMT >30: CPT | Performed by: INTERNAL MEDICINE

## 2025-05-27 PROCEDURE — NC001 PR NO CHARGE: Performed by: INTERNAL MEDICINE

## 2025-05-27 PROCEDURE — 83735 ASSAY OF MAGNESIUM: CPT | Performed by: INTERNAL MEDICINE

## 2025-05-27 PROCEDURE — 84145 PROCALCITONIN (PCT): CPT | Performed by: INTERNAL MEDICINE

## 2025-05-27 PROCEDURE — 92610 EVALUATE SWALLOWING FUNCTION: CPT

## 2025-05-27 PROCEDURE — 80048 BASIC METABOLIC PNL TOTAL CA: CPT | Performed by: INTERNAL MEDICINE

## 2025-05-27 RX ORDER — LEVOFLOXACIN 500 MG/1
500 TABLET, FILM COATED ORAL EVERY 24 HOURS
Qty: 4 TABLET | Refills: 0 | Status: SHIPPED | OUTPATIENT
Start: 2025-05-27 | End: 2025-05-29 | Stop reason: SDUPTHER

## 2025-05-27 RX ADMIN — VANCOMYCIN HYDROCHLORIDE 750 MG: 750 INJECTION, SOLUTION INTRAVENOUS at 05:33

## 2025-05-27 RX ADMIN — FUROSEMIDE 20 MG: 20 TABLET ORAL at 08:11

## 2025-05-27 RX ADMIN — ENOXAPARIN SODIUM 40 MG: 40 INJECTION SUBCUTANEOUS at 08:15

## 2025-05-27 RX ADMIN — MONTELUKAST 10 MG: 10 TABLET, FILM COATED ORAL at 08:13

## 2025-05-27 RX ADMIN — METOPROLOL SUCCINATE 100 MG: 50 TABLET, EXTENDED RELEASE ORAL at 08:13

## 2025-05-27 RX ADMIN — Medication 2000 UNITS: at 08:12

## 2025-05-27 RX ADMIN — Medication 100 MG: at 08:12

## 2025-05-27 RX ADMIN — PANTOPRAZOLE SODIUM 40 MG: 40 TABLET, DELAYED RELEASE ORAL at 05:32

## 2025-05-27 RX ADMIN — PIPERACILLIN AND TAZOBACTAM 4.5 G: 4; .5 INJECTION, POWDER, FOR SOLUTION INTRAVENOUS at 08:13

## 2025-05-27 RX ADMIN — ATORVASTATIN CALCIUM 40 MG: 40 TABLET, FILM COATED ORAL at 08:13

## 2025-05-27 RX ADMIN — GUAIFENESIN 600 MG: 600 TABLET ORAL at 08:11

## 2025-05-27 RX ADMIN — VALACYCLOVIR HYDROCHLORIDE 500 MG: 500 TABLET, FILM COATED ORAL at 08:13

## 2025-05-27 RX ADMIN — ASPIRIN 81 MG CHEWABLE TABLET 81 MG: 81 TABLET CHEWABLE at 08:11

## 2025-05-27 RX ADMIN — PIPERACILLIN AND TAZOBACTAM 4.5 G: 4; .5 INJECTION, POWDER, FOR SOLUTION INTRAVENOUS at 00:41

## 2025-05-27 NOTE — PLAN OF CARE
Problem: Potential for Falls  Goal: Patient will remain free of falls  Description: INTERVENTIONS:  - Educate patient/family on patient safety including physical limitations  - Instruct patient to call for assistance with activity   - Consider consulting OT/PT to assist with strengthening/mobility based on AM PAC & JH-HLM score  - Consult OT/PT to assist with strengthening/mobility   - Keep Call bell within reach  - Keep bed low and locked with side rails adjusted as appropriate  - Keep care items and personal belongings within reach  - Initiate and maintain comfort rounds  - Make Fall Risk Sign visible to staff  - Offer Toileting every 2 Hours, in advance of need  - Initiate/Maintain bed alarm  - Obtain necessary fall risk management equipment: socks  - Apply yellow socks and bracelet for high fall risk patients  - Consider moving patient to room near nurses station  Outcome: Progressing     Problem: PAIN - ADULT  Goal: Verbalizes/displays adequate comfort level or baseline comfort level  Description: Interventions:  - Encourage patient to monitor pain and request assistance  - Assess pain using appropriate pain scale  - Administer analgesics as ordered based on type and severity of pain and evaluate response  - Implement non-pharmacological measures as appropriate and evaluate response  - Consider cultural and social influences on pain and pain management  - Notify physician/advanced practitioner if interventions unsuccessful or patient reports new pain  - Educate patient/family on pain management process including their role and importance of  reporting pain   - Provide non-pharmacologic/complimentary pain relief interventions  Outcome: Progressing     Problem: INFECTION - ADULT  Goal: Absence or prevention of progression during hospitalization  Description: INTERVENTIONS:  - Assess and monitor for signs and symptoms of infection  - Monitor lab/diagnostic results  - Monitor all insertion sites, i.e. indwelling  lines, tubes, and drains  - Monitor endotracheal if appropriate and nasal secretions for changes in amount and color  - [place] appropriate cooling/warming therapies per order  - Administer medications as ordered  - Instruct and encourage patient and family to use good hand hygiene technique  - Identify and instruct in appropriate isolation precautions for identified infection/condition  Outcome: Progressing  Goal: Absence of fever/infection during neutropenic period  Description: INTERVENTIONS:  - Monitor WBC  - Perform strict hand hygiene  - Limit to healthy visitors only  - No plants, dried, fresh or silk flowers with irwin in patient room  Outcome: Progressing     Problem: SAFETY ADULT  Goal: Patient will remain free of falls  Description: INTERVENTIONS:  - Educate patient/family on patient safety including physical limitations  - Instruct patient to call for assistance with activity   - Consider consulting OT/PT to assist with strengthening/mobility based on AM PAC & JH-HLM score  - Consult OT/PT to assist with strengthening/mobility   - Keep Call bell within reach  - Keep bed low and locked with side rails adjusted as appropriate  - Keep care items and personal belongings within reach  - Initiate and maintain comfort rounds  - Make Fall Risk Sign visible to staff  - Offer Toileting every 2 Hours, in advance of need  - Initiate/Maintain bed alarm  - Obtain necessary fall risk management equipment: socks  - Apply yellow socks and bracelet for high fall risk patients  - Consider moving patient to room near nurses station  Outcome: Progressing  Goal: Maintain or return to baseline ADL function  Description: INTERVENTIONS:  -  Assess patient's ability to carry out ADLs; assess patient's baseline for ADL function and identify physical deficits which impact ability to perform ADLs (bathing, care of mouth/teeth, toileting, grooming, dressing, etc.)  - Assess/evaluate cause of self-care deficits   - Assess range of  motion  - Assess patient's mobility; develop plan if impaired  - Assess patient's need for assistive devices and provide as appropriate  - Encourage maximum independence but intervene and supervise when necessary  - Involve family in performance of ADLs  - Assess for home care needs following discharge   - Consider OT consult to assist with ADL evaluation and planning for discharge  - Provide patient education as appropriate  - Monitor functional capacity and physical performance, use of AM PAC & JH-HLM   - Monitor gait, balance and fatigue with ambulation    Outcome: Progressing  Goal: Maintains/Returns to pre admission functional level  Description: INTERVENTIONS:  - Perform AM-PAC 6 Click Basic Mobility/ Daily Activity assessment daily.  - Set and communicate daily mobility goal to care team and patient/family/caregiver.   - Collaborate with rehabilitation services on mobility goals if consulted  - Perform Range of Motion 3 times a day.  - Reposition patient every 2 hours.  - Dangle patient 3 times a day  - Stand patient 3 times a day  - Ambulate patient 3 times a day  - Out of bed to chair 3 times a day   - Out of bed for meals 3 times a day  - Out of bed for toileting  - Record patient progress and toleration of activity level   Outcome: Progressing     Problem: DISCHARGE PLANNING  Goal: Discharge to home or other facility with appropriate resources  Description: INTERVENTIONS:  - Identify barriers to discharge w/patient and caregiver  - Arrange for needed discharge resources and transportation as appropriate  - Identify discharge learning needs (meds, wound care, etc.)  - Arrange for interpretive services to assist at discharge as needed  - Refer to Case Management Department for coordinating discharge planning if the patient needs post-hospital services based on physician/advanced practitioner order or complex needs related to functional status, cognitive ability, or social support system  Outcome:  Progressing     Problem: Knowledge Deficit  Goal: Patient/family/caregiver demonstrates understanding of disease process, treatment plan, medications, and discharge instructions  Description: Complete learning assessment and assess knowledge base.  Interventions:  - Provide teaching at level of understanding  - Provide teaching via preferred learning methods  Outcome: Progressing

## 2025-05-27 NOTE — SPEECH THERAPY NOTE
"Speech Language/Pathology  Speech-Language Pathology Bedside Swallow Evaluation      Patient Name: Gabe Pickard    Today's Date: 5/27/2025     Problem List  Principal Problem:    Multifocal pneumonia  Active Problems:    Dyslipidemia    Lymphoma (HCC)    Hx of CABG    Essential hypertension    Leukocytosis    Gastroesophageal reflux disease with esophagitis    Hypokalemia    CVID (common variable immunodeficiency) (HCC)    Dysphagia    Elevated brain natriuretic peptide (BNP) level    Chest pain      Past Medical History  Past Medical History[1]    Past Surgical History  Past Surgical History[2]    Summary   Pt presents w/ s/s suggestive of functional oral swallow skills though mod pharyngeal dysphagia.     Complete labial seal for retrieval and containment of all materials, no anterior bolus loss present. Timely rotary mastication of dental soft textures w/ complete bolus breakdown and adequate bolus transfer. No oral residue noted. Suspected delayed swallow initiation and reduced hyolaryngeal elevation to palpation. No overt s/s of aspiration though pt w/ h/o sensory deficit per VFSS thus cannot r/o sensory deficit at bedside.     Education initiated w/ pt regarding strategies to optimize swallow safety including frequent/thorough oral care and to notify medical staff if s/s of aspiration arise. Pt verbalized understanding and agreement, denies questions or concerns at this time. Pt agreeable to \"doing the thick here or at least trying it\" despite consuming thin liquids at home.     Pt w/ increased risk of aspiration 2/2 h/o dysphagia w/ sensory deficit, abn lung imaging, and multiple medical co-morbidities. SLP to f/u for diet tolerance as able and appropriate.       Recommended Diet: soft/level 3 diet and honey thick liquids   Recommended Form of Meds: meds whole in puree    Aspiration precautions and swallowing strategies: upright posture, only feed when fully alert, slow rate of feeding, and small " "bites/sips  Other Recommendations: Continue frequent oral care        Current Medical Status  Pt is a 79 y.o. male  with a PMH of hypertension, hyperlipidemia, CAD, status post CABG, multiple myeloma, history of lymphoma, history of colon cancer presented with chest tightness and recurrent multifocal pneumonia.  Comfortable on room air.  Other vitals are stable as well.  He can formulate 1 full CODE STATUS.  Denies any sick contacts.  Will be admitted under [facility] for further management and care.     SLP s/w pt regarding previous VFSS results and recommendation for dysphagia 3/dental soft textures and honey thick liquids. Pt reports consuming thin liquids at home despite VFSS results though stated he was \"okay with doing the thick here or at least trying it.\"     Current Precautions:  Fall  Aspiration      Allergies:  No known food allergies    Past medical history:  Please see H&P for details    Special Studies:  5/26/25 CT chest without contrast:  New left upper and lower lobe tree-in-bud nodularity) opacities, suspicious for pneumonia superimposed on chronic left upper and lower lobe consolidation. Waxing and waning nature could suggest aspiration as an etiology. Short interval follow-up CT in 3   should be considered to ensure resolution and exclude an underlying mass     Small bilateral pleural effusions.     Mildly enlarged mediastinal lymph nodes, likely reactive.    5/26/25 XR chest 2 views:  Mild pulmonary venous congestion with small pleural effusions. See subsequent chest CT.     History of VFSS:  VFSS 8/8/24   Modified (Video) Barium Swallow Study     Summary:  Images are on PACS for review.      Pt presents w/ functional oral swallow skills, mod pharyngeal dysphagia. Complete labial seal for retrieval and containment of all materials, no anterior bolus loss present. Adequate rotary mastication, brisk bolus transfer and reduced bolus control w/ premature spill over BOT. Reduced BOT retraction resulting " "in min-mild BOT residue. Delayed swallow initiation w/ bolus head in the valleculae w/ honey thick liquids and solids, bolus head in the pyriforms w/ all other materials. Reduced laryngeal elevation, anterior hyoid excursion, and vestibule closure. Incomplete epiglottic inversion d/t epiglottis abutting against posterior pharyngeal wall. No penetration or aspiration noted w/ honey thick liquids or solids. Penetration w/ epiglottic undercoating noted w/ thin liquids during all trials. Aspiration noted during the secondary swallow of thin liquids via cup sip, ultimately silent, delayed cough response noted though aspirated materials are past tracheal rings. Trace silent aspiration during the swallow w/ cup sip nectar thick liquids. Strategies did not aid in reducing or eliminating penetration/aspiration. Reduced pharyngeal stripping wave resulting in mild pharyngeal residue in the valleculae and pyriforms w/ solids, min-mild residue in the valleculae and posterior pharyngeal wall w/ all other materials, somewhat reduced w/ secondary swallows. Complete esophageal clearance noted on today's VFSS.      VBS findings and recommendations immediately reviewed w/ pt w/ use of images to aid in understanding. Education provided on strategies to optimize swallow safety, including the importance of oral care and s/s aspiration to monitor for and notify medical team of should they arise. Pt verbalized understanding and denied questions at this time. Pt verbalized interest in continued consumption of thin liquids, verbalizing acceptance of risks of aspiration and benefits of thickened liquids based off today's VFSS. Pt reports \"I'll try the thick liquids but I don't want to stop drinking thin\".         Recommendations:  Diet: dental soft/dysphagia 3 textures  Liquids: honey thick liquids  Meds: whole in puree  Frequent oral care  Upright position  Aspiration Precautions  Results reviewed with: pt, physician  Aspiration " precautions.  Repeat MBS as necessary  If a dedicated assessment of the esophagus is desired, consider esophagram/barium swallow or EGD.       Social/Education/Vocational Hx:  Pt lives w/ spouse     Swallow Information   Current Diet: soft/level 3 diet and thin liquids   Baseline Diet: soft/level 3 diet and honey thick liquids per VFSS       Baseline Assessment   Behavior/Cognition: alert  Speech/Language Status: able to participate in conversation and able to follow commands  Patient Positioning: upright in chair  Pain Status/Interventions/Response to Interventions: No report of or nonverbal indications of pain.       Oral Mechanism Exam  Facial: symmetrical  Labial: WFL  Lingual: WFL  Velum: symmetrical  Mandible: adequate ROM  Dentition: full dentures  Vocal quality:clear/adequate   Volitional Cough: strong/productive   Respiratory Status: on RA        Consistencies Assessed and Performance   Consistencies Administered: honey thick and soft solids- deferred higher level trials 2/2 VFSS results    Oral Stage:   Complete labial seal for retrieval and containment of all materials, no anterior bolus loss present. Timely rotary mastication of dental soft textures w/ complete bolus breakdown and adequate bolus transfer. No oral residue noted.     Pharyngeal Stage:   Suspected delayed swallow initiation and reduced hyolaryngeal elevation to palpation. No overt s/s of aspiration though pt w/ h/o sensory deficit per VFSS thus cannot r/o sensory deficit at bedside.     Esophageal Concerns: none reported    Summary and Recommendations (see above)    Results Reviewed with: patient, RN, and MD     Treatment Recommended: as able and appropriate for diet tolerance     Dysphagia LTG  -Patient will demonstrate safe and effective oral intake (without overt s/s significant oral/pharyngeal dysphagia including s/s penetration or aspiration) for the highest appropriate diet level.     Short Term Goals:  -Pt will tolerate Dysphagia  3/advanced (dental soft) diet and honey thick liquid with no significant s/s oral or pharyngeal dysphagia across 1-3 diagnostic session/s.      Speech Therapy Prognosis   Prognosis: fair/guarded    Prognosis Considerations: age, medical status, prior medical history, and therapeutic potential         [1]   Past Medical History:  Diagnosis Date    Acute on chronic systolic (congestive) heart failure (HCC) 05/15/2024    Acute respiratory failure with hypoxia (HCC) 04/20/2022    Cataract     Colitis     Colon polyp     Fatty liver     History of chemotherapy     History of radiation therapy     History of shingles 2018    History of transfusion     Hyperlipidemia     Malignant tumor of cecum (HCC) 09/26/2013    Pneumonia 08/11/22    Sepsis without acute organ dysfunction (HCC) 02/04/2023    Shingles 2017   [2]   Past Surgical History:  Procedure Laterality Date    AORTIC VALVE REPLACEMENT      CARDIAC VALVE REPLACEMENT  2014    aorta    CATARACT EXTRACTION Bilateral     COLECTOMY      COLONOSCOPY  11/2019    Prior right hemicolectomy    CORONARY ARTERY BYPASS GRAFT      DENTAL SURGERY      JOINT REPLACEMENT  January 2019    left knee    KNEE SURGERY      LYMPH NODE BIOPSY  2003    LYMPHADENECTOMY      OTHER SURGICAL HISTORY      MAZE PROCEDURE    IN ARTHRP KNE CONDYLE&PLATU MEDIAL&LAT COMPARTMENTS Left 01/28/2019    Procedure: ARTHROPLASTY LEFT KNEE TOTAL;  Surgeon: Darell Delgado MD;  Location:  MAIN OR;  Service: Orthopedics    IN LARYNGOSCOPY DIRECT OPERATIVE W/BIOPSY Right 04/27/2022    Procedure: DIRECT LARYNGOSCOPY WITH BIOPSY RIGHT PHARYNX, POSSIBLE RIGHT NECK LYMPH NODE BIOPSY; FROZEN SECTION;  Surgeon: Kevin Hardin MD;  Location:  MAIN OR;  Service: ENT    SKIN BIOPSY      UPPER GASTROINTESTINAL ENDOSCOPY  11/2019    Schatzki ring    US GUIDED LYMPH NODE BIOPSY RIGHT  12/14/2021

## 2025-05-27 NOTE — ASSESSMENT & PLAN NOTE
CT chest showing STEVE/LLL consolidations with new TIB and GGO suspicious for pneumonia. Prior RML/RLL opacities resolved. Small bilateral pleural effusions noted. Given waxing and waning opacities, chronic aspiration is likely  - Speech is following  - On IV zosyn and vanco. Follow up MRSA uclture  - Urinary antigens negative   - Send sputum if able  - Serial procals negative

## 2025-05-27 NOTE — UTILIZATION REVIEW
"Initial Clinical Review    Admission: Date/Time/Statement:   Admission Orders (From admission, onward)       Ordered        05/26/25 1208  INPATIENT ADMISSION  Once                          Orders Placed This Encounter   Procedures    INPATIENT ADMISSION     Standing Status:   Standing     Number of Occurrences:   1     Level of Care:   Med Surg [16]     Estimated length of stay:   More than 2 Midnights     Certification:   I certify that inpatient services are medically necessary for this patient for a duration of greater than two midnights. See H&P and MD Progress Notes for additional information about the patient's course of treatment.     ED Arrival Information       Expected   -    Arrival   5/26/2025 07:01    Acuity   Urgent              Means of arrival   Walk-In    Escorted by   Spouse    Service   Hospitalist    Admission type   Emergency              Arrival complaint   Short of breath             Chief Complaint   Patient presents with    Shortness of Breath     Pt reports feeling short of breath and \"an elephant is on my chest\" Pt reports feeling improved but still there       Initial Presentation: 79 y.o. male  to ED via walk in from home.    Admitted to inpatient with Dx: Multifocal pneumonia.  Presented to ED with left sided chest pain and shortness on breath staring the night prior to arrival. PMHx:HTN, s/p CABG, multiple myeloma, lymphoma, hx colon cancer . On exam: decreased breath sounds.  Wbc 14.13.   CT chest-New left upper and lower lobe tree-in-bud nodularity) opacities, suspicious for pneumonia superimposed on chronic left upper and lower lobe consolidation   ED treatment:  iv acetaminophen, KCL, Duo Neb and started on Zosyn.    Plan includes:  Continue with IV Zosyn and IV vancomycin. Check urine antigens.  Consult speech and pulmonary.  Replete electrolytes.  Aspiration precautions.     Anticipated Length of Stay/Certification Statement:  Patient will be admitted on an inpatient basis with " an anticipated length of stay of greater than 2 midnights secondary to pneumonia .     Date: 5/27/25   Day 2: feels easier to take deep breath.  Trouble swallowing, able to tolerate nectar consistency.  On exam:  mucous membranes dry.  Bruising.  Telemetry sinus.  K 3.6. Continue with IV Zosyn and IV vancomycin.  Speech evaluation completed, recommended dental soft diet with honey thick liquids.  Aspiration precautions      Per Pulmonary 5/27/25 - Multifocal pneumonia/Dysphagia secondary to radiation therapy/common variable immunodeficiency on IVIG/Elevated BNP and appears euvolemic.  Recommend speech.  IV antibiotics and follow cultures.  Send sputum if able.     ED Treatment-Medication Administration from 05/26/2025 0700 to 05/26/2025 1230         Date/Time Order Dose Route Action     05/26/2025 0741 acetaminophen (Ofirmev) injection 1,000 mg 1,000 mg Intravenous New Bag     05/26/2025 1059 potassium chloride (Klor-Con M20) CR tablet 40 mEq 40 mEq Oral Given     05/26/2025 1100 ipratropium (ATROVENT) 0.02 % inhalation solution 0.5 mg 0.5 mg Nebulization Given     05/26/2025 1100 albuterol inhalation solution 5 mg 5 mg Nebulization Given     05/26/2025 1211 piperacillin-tazobactam (ZOSYN) IVPB 4.5 g 4.5 g Intravenous New Bag            Scheduled Medications:  aspirin, 81 mg, Oral, Daily  atorvastatin, 40 mg, Oral, Daily  cholecalciferol, 2,000 Units, Oral, Daily  co-enzyme Q-10, 100 mg, Oral, Daily  docusate sodium, 100 mg, Oral, BID  enoxaparin, 40 mg, Subcutaneous, Daily  furosemide, 20 mg, Oral, Daily  guaiFENesin, 600 mg, Oral, BID  metoprolol succinate, 100 mg, Oral, Daily  montelukast, 10 mg, Oral, Daily  pantoprazole, 40 mg, Oral, BID AC  piperacillin-tazobactam, 4.5 g, Intravenous, Q8H  polyethylene glycol, 17 g, Oral, Daily  valACYclovir, 500 mg, Oral, Daily  vancomycin, 750 mg, Intravenous, Q12H    Continuous IV Infusions:     PRN Meds: not used.   acetaminophen, 650 mg, Oral, Q6H PRN  trimethobenzamide,  100 mg, Intramuscular, Q6H PRN      ED Triage Vitals [05/26/25 0705]   Temperature Pulse Respirations Blood Pressure SpO2 Pain Score   97.9 °F (36.6 °C) 72 18 138/58 97 % 3     Weight (last 2 days)       None          Vital Signs (last 3 days)       Date/Time Temp Pulse Resp BP MAP (mmHg) SpO2 Calculated FIO2 (%) - Nasal Cannula Nasal Cannula O2 Flow Rate (L/min) O2 Device Patient Position - Orthostatic VS Pain    05/27/25 0817 -- -- -- -- -- -- -- -- None (Room air) -- No Pain    05/27/25 07:27:36 97.8 °F (36.6 °C) 76 15 174/94 121 95 % -- -- -- -- --    05/27/25 04:47:56 97.4 °F (36.3 °C) 70 18 159/80 106 97 % -- -- -- -- --    05/27/25 0043 -- -- -- -- -- -- 28 2 L/min Nasal cannula -- No Pain    05/26/25 21:34:46 97.9 °F (36.6 °C) 69 -- 158/79 105 97 % -- -- -- -- --    05/26/25 15:07:30 97.3 °F (36.3 °C) 64 12 140/66 91 94 % -- -- None (Room air) Lying --    05/26/25 15:06:17 97.3 °F (36.3 °C) 58 12 140/66 91 95 % -- -- -- -- --    05/26/25 1440 -- -- -- -- -- -- -- -- None (Room air) -- --    05/26/25 1358 97.3 °F (36.3 °C) 64 12 140/66 -- 94 % -- -- -- -- --    05/26/25 1300 -- -- -- -- -- -- -- -- -- -- No Pain    05/26/25 12:40:46 98.1 °F (36.7 °C) 61 -- 137/66 90 93 % -- -- -- -- --    05/26/25 1130 -- 64 20 151/69 99 100 % -- -- None (Room air) Sitting --    05/26/25 1100 -- 62 20 149/63 91 96 % -- -- None (Room air) Sitting --    05/26/25 0945 -- 63 20 145/63 90 94 % -- -- None (Room air) Sitting --    05/26/25 0830 -- 62 19 145/67 97 93 % -- -- None (Room air) Sitting --    05/26/25 0705 97.9 °F (36.6 °C) 72 18 138/58 -- 97 % -- -- Partial rebreather mask Sitting 3          Pertinent Labs/Diagnostic Test Results:   Radiology:  CT chest without contrast   Final Interpretation by Bradley Landon Kocher, MD (05/26 1059)      New left upper and lower lobe tree-in-bud nodularity) opacities, suspicious for pneumonia superimposed on chronic left upper and lower lobe consolidation. Waxing and waning nature could  suggest aspiration as an etiology. Short interval follow-up CT in 3    should be considered to ensure resolution and exclude an underlying mass      Small bilateral pleural effusions.      Mildly enlarged mediastinal lymph nodes, likely reactive.      The study was marked in EPIC for immediate notification.      Resident: Jairo Houston I, the attending radiologist, have reviewed the images and agree with the final report above.      Workstation performed: [id number]         XR chest 2 views   Final Interpretation by Adeline Howard MD (05/26 1710)      Mild pulmonary venous congestion with small pleural effusions. See subsequent chest CT.            Workstation performed: [id number]           Cardiology:  ECG 12 lead   Final Result by Micky Nevarez MD (05/26 1257)   Normal sinus rhythm with PVC's   Rightward axis   Non-specific intra-ventricular conduction block   Cannot rule out Anterior infarct , age undetermined   T wave abnormality, consider lateral ischemia   Abnormal ECG      Confirmed by Micky Nevarez ([id number]) on 5/26/2025 12:57:55 PM        GI:  No orders to display     Results from last 7 days   Lab Units 05/27/25 0457 05/26/25  0718   WBC Thousand/uL 8.38 14.13*   HEMOGLOBIN g/dL 12.6 12.3   HEMATOCRIT % 39.5 40.3   PLATELETS Thousands/uL 184 222   TOTAL NEUT ABS Thousands/µL 6.29 11.05*     Results from last 7 days   Lab Units 05/27/25 0457 05/26/25  0718   SODIUM mmol/L 141 140   POTASSIUM mmol/L 3.6 3.4*   CHLORIDE mmol/L 106 104   CO2 mmol/L 27 28   ANION GAP mmol/L 8 8   BUN mg/dL 12 15   CREATININE mg/dL 0.72 0.83   EGFR ml/min/1.73sq m 89 84   CALCIUM mg/dL 8.6 8.8   MAGNESIUM mg/dL 2.0 2.0     Results from last 7 days   Lab Units 05/26/25  0718   AST U/L 18   ALT U/L 17   ALK PHOS U/L 122*   TOTAL PROTEIN g/dL 6.4   ALBUMIN g/dL 3.9   TOTAL BILIRUBIN mg/dL 0.60     Results from last 7 days   Lab Units 05/27/25  0457 05/26/25  0718   GLUCOSE RANDOM mg/dL 112 118     Results  from last 7 days   Lab Units 05/26/25  1104 05/26/25  0939 05/26/25  0718   HS TNI 0HR ng/L  --   --  20   HS TNI 2HR ng/L  --  19  --    HSTNI D2 ng/L  --  -1  --    HS TNI 4HR ng/L 19  --   --    HSTNI D4 ng/L -1  --   --      Results from last 7 days   Lab Units 05/26/25  0718   D-DIMER QUANTITATIVE ug/ml FEU <0.27     Results from last 7 days   Lab Units 05/26/25  0718   PROTIME seconds 15.1*   INR  1.13   PTT seconds 30     Results from last 7 days   Lab Units 05/26/25  1104   TSH 3RD GENERATON uIU/mL 4.169     Results from last 7 days   Lab Units 05/27/25  0457 05/26/25  1104   PROCALCITONIN ng/ml 0.10 0.12     Results from last 7 days   Lab Units 05/26/25  0718   BNP pg/mL 810*     Results from last 7 days   Lab Units 05/26/25  0930   CLARITY UA  Clear   COLOR UA  Light Yellow   SPEC GRAV UA  <1.005*   PH UA  5.5   GLUCOSE UA mg/dl 300 (3/10%)*   KETONES UA mg/dl Negative   BLOOD UA  Negative   PROTEIN UA mg/dl Negative   NITRITE UA  Negative   BILIRUBIN UA  Negative   UROBILINOGEN UA (BE) mg/dl <2.0   LEUKOCYTES UA  Negative     Results from last 7 days   Lab Units 05/26/25  1419   STREP PNEUMONIAE ANTIGEN, URINE  Negative   LEGIONELLA URINARY ANTIGEN  Negative     Results from last 7 days   Lab Units 05/26/25  1307   BLOOD CULTURE  Received in Microbiology Lab. Culture in Progress.  Received in Microbiology Lab. Culture in Progress.       Past Medical History[1]  Present on Admission:   Elevated brain natriuretic peptide (BNP) level   Hypokalemia   Leukocytosis   Multifocal pneumonia   Chest pain   Dyslipidemia   Lymphoma (HCC)   Essential hypertension   Gastroesophageal reflux disease with esophagitis   Dysphagia   CVID (common variable immunodeficiency) (HCC)      Admitting Diagnosis: Chest pain [R07.9]  Multiple myeloma (HCC) [C90.00]  Shortness of breath [R06.02]  Multifocal pneumonia [J18.9]  Age/Sex: 79 y.o. male    Network Utilization Review Department  ATTENTION: Please call with any questions or  concerns to [phone] and carefully listen to the prompts so that you are directed to the right person. All voicemails are confidential.   For Discharge needs, contact Care Management DC Support Team at [phone] opt. 2  Send all requests for admission clinical reviews, approved or denied determinations and any other requests to dedicated fax number below belonging to the [facility] where the patient is receiving treatment. List of dedicated fax numbers for the Facilities:  FACILITY NAME UR FAX NUMBER   ADMISSION DENIALS (Administrative/Medical Necessity) [phone]   DISCHARGE SUPPORT TEAM (NETWORK) [phone]   PARENT CHILD HEALTH (Maternity/NICU/Pediatrics) [phone]   [facility] [phone]   [facility] [phone]   [facility] [phone]   [facility] [phone]   Critical access hospital [phone]   [facility] [phone]   [facility] [phone]   [facility] [phone]   [facility] [phone]   Critical access hospital [phone]   [facility] [phone]   [facility] [phone]              [1]   Past Medical History:  Diagnosis Date    Acute on chronic systolic (congestive) heart failure (HCC) 05/15/2024    Acute respiratory failure with hypoxia (HCC) 04/20/2022    Cataract     Colitis     Colon polyp     Fatty liver     History of chemotherapy     History of radiation therapy     History of shingles 2018    History of transfusion     Hyperlipidemia     Malignant tumor of cecum (HCC) 09/26/2013    Pneumonia 08/11/22    Sepsis without acute organ dysfunction (HCC) 02/04/2023    Shingles 2017

## 2025-05-27 NOTE — ASSESSMENT & PLAN NOTE
Patient with recurrent multifocal pneumonia because of dysphagia and with prior radiation therapy  Immunosuppressed secondary to IVIG transfusions, on maintenance chemotherapy for multiple myeloma.  Also history noted for prior lymphoma and colon cancer status post resection    CT chest-New left upper and lower lobe tree-in-bud nodularity) opacities, suspicious for pneumonia superimposed on chronic left upper and lower lobe consolidation     Continue with IV Zosyn and IV vancomycin  Urine antigens-negative  Follow sputum culture/Gram stain  Follow-up MRSA, if negative discontinue IV vancomycin  Speech evaluation completed, recommended dental soft diet with honey thick liquids  Aspiration precautions  Inpatient consult to pulmonology, appreciate recommendations

## 2025-05-27 NOTE — ASSESSMENT & PLAN NOTE
Recent Labs     05/26/25  0718 05/27/25  0457   WBC 14.13* 8.38      In the setting of multifocal pneumonia  Treatment as above  Continue to monitor

## 2025-05-27 NOTE — CASE MANAGEMENT
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    Glossitis 2022    Mouth ulcers 2021    Iron deficiency anemia 2021    Hypokalemia 04/15/2021    Gastroesophageal reflux disease with esophagitis 2021    Roberts's esophagus without dysplasia 2019    Schatzki's ring of distal esophagus 2019    Pharyngeal dysphagia 10/11/2019    Personal history of colon cancer 10/11/2019    Paroxysmal atrial fibrillation (HCC)     Leukocytosis 2019    Chronic diastolic congestive heart failure (HCC) 2019    History of aortic valve replacement with bioprosthetic valve 2019    S/P total knee arthroplasty, left 2019    Essential hypertension 2018    Hx of CABG     Primary localized osteoarthritis of right knee 2018    Multiple myeloma not having achieved remission (formerly [facility]) 2018    Basal cell carcinoma of skin 2018    Erectile dysfunction 2017    Coronary artery disease involving native heart with angina pectoris, unspecified vessel or lesion type (formerly [facility]) 10/07/2014    Type 2 diabetes mellitus with diabetic microalbuminuria, without long-term current use of insulin (formerly [facility]) 2012    Fatty liver 2012    Lymphoma (formerly [facility]) 2012    Dyslipidemia 2012      LOS (days): 1  Geometric Mean LOS (GMLOS) (days): 2.8  Days to GMLOS:1.7     OBJECTIVE:    Risk  of Unplanned Readmission Score: 21.36         Current admission status: Inpatient       Preferred Pharmacy:   [id number] [place] - [address]  [address]  [place] [address]  Phone: [phone] Fax: [phone]    Primary Care Provider: Radha Pettit DO    Primary Insurance: BLUE CROSS [employer] REP  Secondary Insurance:     ASSESSMENT:  Active Health Care Proxies       Pavel Pickard  Alternate Health Care Agent - Spouse   Primary Phone: [phone] (Mobile)  Home Phone: [phone]                 Advance Directives  Does patient have a Health Care POA?: Yes  Does patient have Advance Directives?: Yes  Advance Directives: Power of  for health care  Primary Contact: Pavel spouse.         Readmission Root Cause  30 Day Readmission: No    Patient Information  Admitted from:: Home  Mental Status: Alert  During Assessment patient was accompanied by: Not accompanied during assessment  Assessment information provided by:: Patient  Primary Caregiver: Self  Support Systems: Self, Spouse/significant other, Family members  County of Residence: [place]  What [facility] do you live in?: [place].  Home entry access options. Select all that apply.: Stairs  Number of steps to enter home.: 1  Type of Current Residence: [address] [place] home  Upon entering residence, is there a bedroom on the main floor (no further steps)?: Yes  Upon entering residence, is there a bathroom on the main floor (no further steps)?: Yes  Living Arrangements: Lives w/ Spouse/significant other  Is patient a ?: No    Activities of Daily Living Prior to Admission  Functional Status: Independent  Completes ADLs independently?: Yes  Ambulates independently?: Yes  Does patient use assisted devices?: No  Does patient currently own DME?: Yes  What DME does the patient currently own?: Home Oxygen concentrator  Does patient have a history of Outpatient Therapy (PT/OT)?: Yes  Does the patient have a history  of Short-Term Rehab?: No  Does patient have a history of HHC?: No  Does patient currently have HHC?: No         Patient Information Continued  Income Source: SSI/SSD  Does patient have prescription coverage?: Yes  Can the patient afford their medications and any related supplies (such as glucometers or test strips)?: Yes  Does patient receive dialysis treatments?: No  Does patient have a history of substance abuse?: No  Does patient have a history of Mental Health Diagnosis?: No         Means of Transportation  Means of Transport to Appts:: Drives Self          DISCHARGE DETAILS:    Discharge planning discussed with:: Pt  Freedom of Choice: Yes     CM contacted family/caregiver?: No- see comments (Pt is in contact with spouse.)  Were Treatment Team discharge recommendations reviewed with patient/caregiver?: Yes  Did patient/caregiver verbalize understanding of patient care needs?: Yes  Were patient/caregiver advised of the risks associated with not following Treatment Team discharge recommendations?: Yes         Requested Home Health Care         Is the patient interested in HHC at discharge?: No    DME Referral Provided  Referral made for DME?: No    Other Referral/Resources/Interventions Provided:  Interventions: None Indicated         Treatment Team Recommendation: Home  Discharge Destination Plan:: Home                                         Additional Comments: CM met with pt at bedside to plan discharge. Pt lives with spouse in a 2sh, 1ste, 1st fl setup. Pt reports being independent with ADLs and walking. Drives. Does not use but owns a home 02 concentrator that he purchased privately. Hx of OPPT. No STR or HHC. No inpatient psych or D&A treatment. Pavel his spouse is medical POA. [facility]. Spouse will transport.

## 2025-05-27 NOTE — ASSESSMENT & PLAN NOTE
Van no cardiac  C/o chest tightness  Associates it with pneumonia, experiences same kind of chest pressure with every pneumonia episode  Trop- negative   EKG reviewed     Resolved

## 2025-05-27 NOTE — ASSESSMENT & PLAN NOTE
Secondary to radiation therapy. Speech is following. Eval showing moderate pharyngeal dysphagia. Recommending dysphagia diet   - Aspiration precautions

## 2025-05-27 NOTE — PROGRESS NOTES
Progress Note - Hospitalist   Name: Gabe Pickard 79 y.o. male I MRN: [id number]  Unit/Bed#: -01 I Date of Admission: 5/26/2025   Date of Service: 5/27/2025 I Hospital Day: 1    Assessment & Plan  Multifocal pneumonia  Patient with recurrent multifocal pneumonia because of dysphagia and with prior radiation therapy  Immunosuppressed secondary to IVIG transfusions, on maintenance chemotherapy for multiple myeloma.  Also history noted for prior lymphoma and colon cancer status post resection    CT chest-New left upper and lower lobe tree-in-bud nodularity) opacities, suspicious for pneumonia superimposed on chronic left upper and lower lobe consolidation     Continue with IV Zosyn and IV vancomycin  Urine antigens-negative  Follow sputum culture/Gram stain  Follow-up MRSA, if negative discontinue IV vancomycin  Speech evaluation completed, recommended dental soft diet with honey thick liquids  Aspiration precautions  Inpatient consult to pulmonology, appreciate recommendations  Dyslipidemia  Continue with atorvastatin 40 mg daily at bedtime  Continue with coenzyme Q 1000 mg daily  Lymphoma (HCC)  Under surveillance  Hx of CABG  Continue with aspirin, statin and beta-blocker  Essential hypertension  BP (!) 174/94   Pulse 76   Temp 97.8 °F (36.6 °C)   Resp 15   SpO2 95%   Continue with Toprol- mg daily  Continue with Lasix 20 mg daily  Vitals as per protocol  Leukocytosis  Recent Labs     05/26/25  0718 05/27/25  0457   WBC 14.13* 8.38      In the setting of multifocal pneumonia  Treatment as above  Continue to monitor  Gastroesophageal reflux disease with esophagitis  Continue with Protonix  Hypokalemia  Recent Labs     05/26/25  0718 05/27/25  0457   K 3.4* 3.6   MG 2.0 2.0      Monitor and replete as needed   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  Dysphagia  Secondary to radiation therapy  Speech evaluation, on mechanical soft/honey thick liquids  Aspiration  precautions  Elevated brain natriuretic peptide (BNP) level  BNP-in 800's  Doesn't appear to volume overload   Continue home lasix  Recent echo reviewed - with preserved EF, dilated atria and AV gradient 11 mmHg     Chest pain  Likley no cardiac  C/o chest tightness  Associates it with pneumonia, experiences same kind of chest pressure with every pneumonia episode  Trop- negative   EKG reviewed     Resolved    VTE Pharmacologic Prophylaxis:   Moderate Risk (Score 3-4) - Pharmacological DVT Prophylaxis Ordered: enoxaparin (Lovenox).    Mobility:   Basic Mobility Inpatient Raw Score: 24  JH-HLM Goal: 8: Walk 250 feet or more  JH-HLM Achieved: 7: Walk 25 feet or more  JH-HLM Goal achieved. Continue to encourage appropriate mobility.    Patient Centered Rounds: I performed bedside rounds with nursing staff today.   Discussions with Specialists or Other Care Team Provider: yes    Education and Discussions with Family / Patient: Yes, wife.     Current Length of Stay: 1 day(s)  Current Patient Status: Inpatient   Certification Statement: The patient will continue to require additional inpatient hospital stay due to pending stabilization for multifocal pneumonia  Discharge Plan: Anticipate discharge in 24-48 hrs to home.    Code Status: Level 1 - Full Code    Subjective   Seen and examined at bedside  Comfortable on room air  Denies any chest pain      Objective :  Temp:  [97.3 °F (36.3 °C)-98.1 °F (36.7 °C)] 97.8 °F (36.6 °C)  HR:  [58-76] 76  BP: (137-174)/(66-94) 174/94  Resp:  [12-18] 15  SpO2:  [93 %-97 %] 95 %  O2 Device: None (Room air)  Nasal Cannula O2 Flow Rate (L/min):  [2 L/min] 2 L/min    There is no height or weight on file to calculate BMI.     Input and Output Summary (last 24 hours):     Intake/Output Summary (Last 24 hours) at 5/27/2025 1141  Last data filed at 5/27/2025 1009  Gross per 24 hour   Intake 2064 ml   Output 2040 ml   Net 24 ml       Physical Exam  Vitals reviewed.   Constitutional:        Appearance: Normal appearance.   HENT:      Head: Atraumatic.      Mouth/Throat:      Mouth: Mucous membranes are dry.     Cardiovascular:      Rate and Rhythm: Normal rate.      Heart sounds: Normal heart sounds.   Pulmonary:      Effort: No respiratory distress.   Abdominal:      General: There is no distension.     Musculoskeletal:      Right lower leg: No edema.      Left lower leg: No edema.     Skin:     Findings: Bruising present.     Neurological:      Mental Status: He is alert and oriented to person, place, and time. Mental status is at baseline.           Lines/Drains:        Telemetry:  Telemetry Orders (From admission, onward)               24 Hour Telemetry Monitoring  Continuous x 24 Hours (Telem)        Expiring   Question:  Reason for 24 Hour Telemetry  Answer:  Metabolic/electrolyte disturbance with high probability of dysrhythmia. K level <3 or >6 OR KCL infusion >10mEq/hr                     Telemetry Reviewed: Normal Sinus Rhythm  Indication for Continued Telemetry Use: No indication for continued use. Will discontinue.                Lab Results: I have reviewed the following results:   Results from last 7 days   Lab Units 05/27/25  0457   WBC Thousand/uL 8.38   HEMOGLOBIN g/dL 12.6   HEMATOCRIT % 39.5   PLATELETS Thousands/uL 184   SEGS PCT % 75   LYMPHO PCT % 12*   MONO PCT % 10   EOS PCT % 2     Results from last 7 days   Lab Units 05/27/25 0457 05/26/25  0718   SODIUM mmol/L 141 140   POTASSIUM mmol/L 3.6 3.4*   CHLORIDE mmol/L 106 104   CO2 mmol/L 27 28   BUN mg/dL 12 15   CREATININE mg/dL 0.72 0.83   ANION GAP mmol/L 8 8   CALCIUM mg/dL 8.6 8.8   ALBUMIN g/dL  --  3.9   TOTAL BILIRUBIN mg/dL  --  0.60   ALK PHOS U/L  --  122*   ALT U/L  --  17   AST U/L  --  18   GLUCOSE RANDOM mg/dL 112 118     Results from last 7 days   Lab Units 05/26/25  0718   INR  1.13             Results from last 7 days   Lab Units 05/27/25  0457 05/26/25  1104   PROCALCITONIN ng/ml 0.10 0.12       Recent  Cultures (last 7 days):   Results from last 7 days   Lab Units 05/26/25  1419 05/26/25  1307   BLOOD CULTURE   --  Received in Microbiology Lab. Culture in Progress.  Received in Microbiology Lab. Culture in Progress.   LEGIONELLA URINARY ANTIGEN  Negative  --        Imaging Results Review: No pertinent imaging studies reviewed.  Other Study Results Review: No additional pertinent studies reviewed.    Last 24 Hours Medication List:     Current Facility-Administered Medications:     acetaminophen (TYLENOL) tablet 650 mg, Q6H PRN    aspirin chewable tablet 81 mg, Daily    atorvastatin (LIPITOR) tablet 40 mg, Daily    Cholecalciferol (VITAMIN D3) tablet 2,000 Units, Daily    co-enzyme Q-10 capsule 100 mg, Daily    docusate sodium (COLACE) capsule 100 mg, BID    enoxaparin (LOVENOX) subcutaneous injection 40 mg, Daily    furosemide (LASIX) tablet 20 mg, Daily    guaiFENesin (MUCINEX) 12 hr tablet 600 mg, BID    metoprolol succinate (TOPROL-XL) 24 hr tablet 100 mg, Daily    montelukast (SINGULAIR) tablet 10 mg, Daily    pantoprazole (PROTONIX) EC tablet 40 mg, BID AC    piperacillin-tazobactam (ZOSYN) IVPB (EXTENDED INFUSION) 4.5 g, Q8H, Last Rate: 4.5 g (05/27/25 0041)    polyethylene glycol (MIRALAX) packet 17 g, Daily    trimethobenzamide (TIGAN) IM injection 100 mg, Q6H PRN    valACYclovir (VALTREX) tablet 500 mg, Daily    vancomycin (VANCOCIN) IVPB (premix in dextrose) 750 mg 150 mL, Q12H, Last Rate: 750 mg (05/27/25 0533)    Administrative Statements   Today, Patient Was Seen By: Sara Ziegler MD  I have spent a total time of 39 minutes in caring for this patient on the day of the visit/encounter including Patient and family education, Importance of tx compliance, Documenting in the medical record, Reviewing/placing orders in the medical record (including tests, medications, and/or procedures), Obtaining or reviewing history  , and Communicating with other healthcare professionals .    **Please Note: This note may  have been constructed using a voice recognition system.**

## 2025-05-27 NOTE — PROGRESS NOTES
Gabe Pickard is a 79 y.o. male who is currently ordered Vancomycin IV with management by the Pharmacy Consult service.  Relevant clinical data and objective / subjective history reviewed.  Vancomycin Assessment:  Indication and Goal AUC/Trough: Pneumonia (goal -600, trough >10), -600, trough >10  Clinical Status: stable  Micro:   Pending  Renal Function:  SCr: 0.72 mg/dL  CrCl: 80.3 mL/min  Renal replacement: Not on dialysis  Days of Therapy: 2  Current Dose: 750mg every 12 hours  Vancomycin Plan:  New Dosing: no change  Estimated AUC: 407 mcg*hr/mL  Estimated Trough: 11.1 mcg/mL  Next Level: 5/28/25 at 0430  Renal Function Monitoring: Daily BMP and UOP  Pharmacy will continue to follow closely for s/sx of nephrotoxicity, infusion reactions and appropriateness of therapy.  BMP and CBC will be ordered per protocol. We will continue to follow the patient’s culture results and clinical progress daily.    Kevin Richards, Pharmacist, PharmD, BCPS

## 2025-05-27 NOTE — ASSESSMENT & PLAN NOTE
Patient with recurrent multifocal pneumonia because of dysphagia and with prior radiation therapy  Immunosuppressed secondary to IVIG transfusions, on maintenance chemotherapy for multiple myeloma.  Also history noted for prior lymphoma and colon cancer status post resection    CT chest-New left upper and lower lobe tree-in-bud nodularity) opacities, suspicious for pneumonia superimposed on chronic left upper and lower lobe consolidation     Continue with IV Zosyn and IV vancomycin, will be discharged on po levaquin  Urine antigens-negative  Follow sputum culture/Gram stain  Speech evaluation completed, recommended dental soft diet with honey thick liquids  Aspiration precautions

## 2025-05-27 NOTE — ASSESSMENT & PLAN NOTE
BP (!) 174/94   Pulse 76   Temp 97.8 °F (36.6 °C)   Resp 15   SpO2 95%   Continue with Toprol- mg daily  Continue with Lasix 20 mg daily  Vitals as per protocol

## 2025-05-27 NOTE — ASSESSMENT & PLAN NOTE
Secondary to radiation therapy  Speech evaluation, on mechanical soft/honey thick liquids  Aspiration precautions

## 2025-05-27 NOTE — UTILIZATION REVIEW
NOTIFICATION OF INPATIENT ADMISSION   AUTHORIZATION REQUEST   SERVICING FACILITY:   [place] [address]  Tax ID: [id number]  NPI: [id number] ATTENDING PROVIDER:  Attending Name and NPI#: Sara Ziegler Md [[id number]]  Address: [address] [place] [address]  Phone: [phone]   ADMISSION INFORMATION:  Place of Service: Inpatient [facility]  Place of Service Code: 21  Inpatient Admission Date/Time: 5/26/25 12:08 PM  Discharge Date/Time: No discharge date for patient encounter.  Admitting Diagnosis Code/Description:  Chest pain [R07.9]  Multiple myeloma (HCC) [C90.00]  Shortness of breath [R06.02]  Multifocal pneumonia [J18.9]     UTILIZATION REVIEW CONTACT:  Rosamaria Horne, Utilization   Network Utilization Review Department  Phone: [phone]  Fax: [phone]  Email: [email or web address]  Contact for approvals/pending authorizations, clinical reviews, and discharge.     PHYSICIAN ADVISORY SERVICES:  Medical Necessity Denial & Hvac-zn-Qxyw Review  Phone: [phone]  Fax: [phone]  Email: [email or web address]     DISCHARGE SUPPORT TEAM:  For Patients Discharge Needs & Updates  Phone: [phone] opt. 2 Fax: [phone]  Email: [email or web address]

## 2025-05-27 NOTE — ASSESSMENT & PLAN NOTE
Recent Labs     05/26/25  0718 05/27/25  0457   K 3.4* 3.6   MG 2.0 2.0      Monitor and replete as needed

## 2025-05-27 NOTE — DISCHARGE SUMMARY
Discharge Summary - Hospitalist   Name: Gabe Pickard 79 y.o. male I MRN: [id number]  Unit/Bed#: -01 I Date of Admission: 5/26/2025   Date of Service: 5/27/2025 I Hospital Day: 1     Assessment & Plan  Multifocal pneumonia  Patient with recurrent multifocal pneumonia because of dysphagia and with prior radiation therapy  Immunosuppressed secondary to IVIG transfusions, on maintenance chemotherapy for multiple myeloma.  Also history noted for prior lymphoma and colon cancer status post resection    CT chest-New left upper and lower lobe tree-in-bud nodularity) opacities, suspicious for pneumonia superimposed on chronic left upper and lower lobe consolidation     Continue with IV Zosyn and IV vancomycin, will be discharged on po levaquin  Urine antigens-negative  Follow sputum culture/Gram stain  Speech evaluation completed, recommended dental soft diet with honey thick liquids  Aspiration precautions    Dyslipidemia  Continue with atorvastatin 40 mg daily at bedtime  Continue with coenzyme Q 1000 mg daily  Lymphoma (HCC)  Under surveillance  Hx of CABG  Continue with aspirin, statin and beta-blocker  Essential hypertension  BP (!) 174/94   Pulse 76   Temp 97.8 °F (36.6 °C)   Resp 15   SpO2 95%   Continue with Toprol- mg daily  Continue with Lasix 20 mg daily  Vitals as per protocol  Leukocytosis  Recent Labs     05/26/25  0718 05/27/25  0457   WBC 14.13* 8.38      In the setting of multifocal pneumonia  Treatment as above  Continue to monitor  Gastroesophageal reflux disease with esophagitis  Continue with Protonix  Hypokalemia  Recent Labs     05/26/25  0718 05/27/25  0457   K 3.4* 3.6   MG 2.0 2.0      Monitor and replete as needed   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  Dysphagia  Secondary to radiation therapy  Speech evaluation, on mechanical soft/honey thick liquids  Aspiration precautions  Elevated brain natriuretic peptide (BNP) level  BNP-in 800's  Doesn't  appear to volume overload   Continue home lasix  Recent echo reviewed - with preserved EF, dilated atria and AV gradient 11 mmHg     Chest pain  Likley no cardiac  C/o chest tightness  Associates it with pneumonia, experiences same kind of chest pressure with every pneumonia episode  Trop- negative   EKG reviewed     Resolved     Admission Date: 5/26/2025 0702  Discharge Date: 05/27/25  Admitting Diagnosis: Chest pain [R07.9]  Multiple myeloma (HCC) [C90.00]  Shortness of breath [R06.02]  Multifocal pneumonia [J18.9]  Discharge Diagnosis:   Medical Problems       Resolved Problems  Date Reviewed: 5/27/2025   None             Procedures Performed:   Orders Placed This Encounter   Procedures    ED ECG Documentation Only       Summary of Hospital Course:  presented with pneumonia secondary to aspiration. Placed on modified diet, de- escalated antibiotics to po levaquin. Stable for discharge from pulmonary standpoint    Significant Findings, Care, Treatment and Services Provided: see above     Complications: none    Condition at Discharge: stable       Discharge instructions/Information to patient and family:   See After Visit Summary (AVS) for information provided to patient and family.      Provisions for Follow-Up Care:  See after visit summary for information related to follow-up care and any pertinent home health orders.      PCP: Radha Pettit DO    Disposition: Home    Planned Readmission: No     Discharge Medications:  See after visit summary for reconciled discharge medications provided to patient and family.      Discharge Statement:  I have spent a total time of 36 minutes in caring for this patient on the day of the visit/encounter. >30 minutes of time was spent on: Patient and family education, Importance of tx compliance, Counseling / Coordination of care, Documenting in the medical record, Reviewing / ordering tests, medicine, procedures  , and Communicating with other healthcare professionals  .

## 2025-05-27 NOTE — PLAN OF CARE
Problem: PAIN - ADULT  Goal: Verbalizes/displays adequate comfort level or baseline comfort level  Description: Interventions:  - Encourage patient to monitor pain and request assistance  - Assess pain using appropriate pain scale  - Administer analgesics as ordered based on type and severity of pain and evaluate response  - Implement non-pharmacological measures as appropriate and evaluate response  - Consider cultural and social influences on pain and pain management  - Notify physician/advanced practitioner if interventions unsuccessful or patient reports new pain  - Educate patient/family on pain management process including their role and importance of  reporting pain   - Provide non-pharmacologic/complimentary pain relief interventions  Outcome: Progressing     Problem: INFECTION - ADULT  Goal: Absence or prevention of progression during hospitalization  Description: INTERVENTIONS:  - Assess and monitor for signs and symptoms of infection  - Monitor lab/diagnostic results  - Monitor all insertion sites, i.e. indwelling lines, tubes, and drains  - Monitor endotracheal if appropriate and nasal secretions for changes in amount and color  - [place] appropriate cooling/warming therapies per order  - Administer medications as ordered  - Instruct and encourage patient and family to use good hand hygiene technique  - Identify and instruct in appropriate isolation precautions for identified infection/condition  Outcome: Progressing     Problem: INFECTION - ADULT  Goal: Absence of fever/infection during neutropenic period  Description: INTERVENTIONS:  - Monitor WBC  - Perform strict hand hygiene  - Limit to healthy visitors only  - No plants, dried, fresh or silk flowers with irwin in patient room  Outcome: Progressing     Problem: SAFETY ADULT  Goal: Maintains/Returns to pre admission functional level  Description: INTERVENTIONS:  - Perform AM-PAC 6 Click Basic Mobility/ Daily Activity assessment daily.  - Set and  communicate daily mobility goal to care team and patient/family/caregiver.   - Collaborate with rehabilitation services on mobility goals if consulted  - Perform Range of Motion 3 times a day.  - Reposition patient every 2 hours.  - Dangle patient 3 times a day  - Stand patient 3 times a day  - Ambulate patient 3 times a day  - Out of bed to chair 3 times a day   - Out of bed for meals 3 times a day  - Out of bed for toileting  - Record patient progress and toleration of activity level   Outcome: Progressing

## 2025-05-28 ENCOUNTER — TRANSITIONAL CARE MANAGEMENT (OUTPATIENT)
Dept: FAMILY MEDICINE CLINIC | Facility: HOSPITAL | Age: 80
End: 2025-05-28

## 2025-05-28 ENCOUNTER — TELEPHONE (OUTPATIENT)
Age: 80
End: 2025-05-28

## 2025-05-28 LAB — MRSA NOSE QL CULT: NORMAL

## 2025-05-29 ENCOUNTER — TELEMEDICINE (OUTPATIENT)
Dept: FAMILY MEDICINE CLINIC | Facility: HOSPITAL | Age: 80
End: 2025-05-29
Payer: COMMERCIAL

## 2025-05-29 VITALS — BODY MASS INDEX: 20.47 KG/M2 | HEIGHT: 70 IN | WEIGHT: 143 LBS

## 2025-05-29 DIAGNOSIS — J18.9 MULTIFOCAL PNEUMONIA: ICD-10-CM

## 2025-05-29 DIAGNOSIS — K22.2 SCHATZKI'S RING OF DISTAL ESOPHAGUS: ICD-10-CM

## 2025-05-29 DIAGNOSIS — D83.9 CVID (COMMON VARIABLE IMMUNODEFICIENCY) (HCC): ICD-10-CM

## 2025-05-29 DIAGNOSIS — Z09 HOSPITAL DISCHARGE FOLLOW-UP: Primary | ICD-10-CM

## 2025-05-29 DIAGNOSIS — R13.19 ESOPHAGEAL DYSPHAGIA: ICD-10-CM

## 2025-05-29 DIAGNOSIS — R07.9 CHEST PAIN, UNSPECIFIED TYPE: ICD-10-CM

## 2025-05-29 DIAGNOSIS — I50.32 CHRONIC DIASTOLIC CONGESTIVE HEART FAILURE (HCC): ICD-10-CM

## 2025-05-29 DIAGNOSIS — R13.10 DYSPHAGIA, UNSPECIFIED TYPE: ICD-10-CM

## 2025-05-29 DIAGNOSIS — K21.00 GASTROESOPHAGEAL REFLUX DISEASE WITH ESOPHAGITIS: ICD-10-CM

## 2025-05-29 DIAGNOSIS — K22.70 BARRETT'S ESOPHAGUS WITHOUT DYSPLASIA: ICD-10-CM

## 2025-05-29 DIAGNOSIS — Z95.3 HISTORY OF AORTIC VALVE REPLACEMENT WITH BIOPROSTHETIC VALVE: Chronic | ICD-10-CM

## 2025-05-29 PROCEDURE — 99496 TRANSJ CARE MGMT HIGH F2F 7D: CPT | Performed by: INTERNAL MEDICINE

## 2025-05-29 RX ORDER — OMEPRAZOLE 40 MG/1
40 CAPSULE, DELAYED RELEASE ORAL DAILY
Start: 2025-05-29

## 2025-05-29 RX ORDER — LEVOFLOXACIN 500 MG/1
500 TABLET, FILM COATED ORAL EVERY 24 HOURS
Qty: 5 TABLET | Refills: 0 | Status: SHIPPED | OUTPATIENT
Start: 2025-05-29 | End: 2025-06-03

## 2025-05-29 NOTE — ASSESSMENT & PLAN NOTE
Using thick-it in liquids and avoiding hard to chew meats/foods, doing well, call with s/sx of aspiration

## 2025-05-29 NOTE — ASSESSMENT & PLAN NOTE
Still receiving IVIG but now every 2 mos, rx for Levaquin sent to ONLY use if resp symptoms occur while in [place], con't f/u and tx as per specialist, will follow as well

## 2025-05-29 NOTE — PROGRESS NOTES
Virtual TCM Visit:Name: Gabe Pickard      : 1945      MRN: [id number]  Encounter Provider: Radha Pettit DO  Encounter Date: 2025   Encounter department: [facility] [address]ARE [address]   :  Assessment & Plan  Hospital discharge follow-up         Multifocal pneumonia  Has 2 more days  of Levaquin left, finish abx as directed and call with relapse in symptoms, will send refill for Levaquin over ONLY to use prn while in [place] if resp symptoms develop  Orders:    levofloxacin (LEVAQUIN) 500 mg tablet; Take 1 tablet (500 mg total) by mouth every 24 hours for 5 days    Chest pain, unspecified type  Resolved, felt to be d/t concurrent pneumonia, call with relapse in symptoms        CVID (common variable immunodeficiency) (HCC)  Still receiving IVIG but now every 2 mos, rx for Levaquin sent to ONLY use if resp symptoms occur while in [place], con't f/u and tx as per specialist, will follow as well       Dysphagia, unspecified type  Using thick-it in liquids and avoiding hard to chew meats/foods, doing well, call with s/sx of aspiration        Chronic diastolic congestive heart failure (HCC)  Wt Readings from Last 3 Encounters:   25 64.9 kg (143 lb)   25 67.1 kg (148 lb)   25 67.5 kg (148 lb 12.8 oz)   No current s/sx of decompensation, BNP up a bit while IP but currently w/o acute symptoms, on Jardiance and Metoprolol succ       History of aortic valve replacement with bioprosthetic valve  Has seen ID and had Echo recently d/t persistent Salmonella, no valve vegetation noted, was told f/u with ID prn       Gastroesophageal reflux disease with esophagitis  Currently only taking PPI once daily, notes no reflux symptoms with decrease to q day, med list updated, call with relapse in symptoms   Orders:    omeprazole (PriLOSEC) 40 MG capsule; Take 1 capsule (40 mg total) by mouth daily    Roberts's esophagus without dysplasia  Currently only taking PPI once daily, notes  no reflux symptoms with decrease to q day, med list updated, call with relapse in symptoms, last EGD 1/24   Orders:    omeprazole (PriLOSEC) 40 MG capsule; Take 1 capsule (40 mg total) by mouth daily    Esophageal dysphagia  Currently only taking PPI once daily, notes no reflux symptoms with decrease to q day, med list updated, call with relapse in symptoms   Orders:    omeprazole (PriLOSEC) 40 MG capsule; Take 1 capsule (40 mg total) by mouth daily    Schatzki's ring of distal esophagus  Currently only taking PPI once daily, notes no reflux symptoms with decrease to q day, med list updated, call with relapse in symptoms   Orders:    omeprazole (PriLOSEC) 40 MG capsule; Take 1 capsule (40 mg total) by mouth daily         Colonoscopy 1/21 - 5 yrs if clinically indicated     PSA 1/22 - no further needed d/t age     BW 3/25 (A1C 6.2) - labs ordered for after 6/17/25  DM foot exam 4/25  DM eye exam 3/25 - 2 yrs  Ur microalbumin:Cr 3/25     AWV after 5/21/25 - scheduled for Oct      History of Present Illness     Transitional Care Management Review:   Gabe Pickard is a 79 y.o. male here for TCM follow up.  Pt was admitted to [facility] from 5/26/25 to 5/27/25.  Records were reviewed by myself in detail and events are summarized below.    During the TCM phone call patient stated:  TCM Call (since 5/15/2025)       Date and time call was made  5/28/2025  9:49 AM    Hospital care reviewed  Records reviewed    Patient was hospitialized at  [facility]    Date of Admission  05/25/25    Date of discharge  05/27/25    Diagnosis  pneumonia    Disposition  Home    Were the patients medications reviewed and updated  No    Current Symptoms  Fatigue          TCM Call (since 5/15/2025)       Post hospital issues  Reduced activity    Scheduled for follow up?  Yes    Referrals needed  speech thick liquids    Did you obtain your prescribed medications  Yes    Do you need help managing your prescriptions or medications  No    Is  transportation to your appointment needed  No    I have advised the patient to call PCP with any new or worsening symptoms  Safia marcum MA    Living Arrangements  Family members    Support System  Family    Do you have social support  Yes, as much as I need          HPI Pt presented to [facility] ED on 5/26/25 with c/o SOB and chest pressure.  He hadn't been feeling well and went to bed and couldn't breath well so he sat in the chair and felt chest pressure and continued SOB.  In the ED VSS and exam was notable for decreased BS B/L.  Work up in ED notable for , K 3.4, Alk Phos 122, and WBC 14.13.  Procalcitonin was negative.  Ur Legionella and Ur Strep pneumo were both neg.  CXR showed mild pulm venous congestion and small pleural effusions. CT chest showed new STEVE and LLL opacities superimposed on chronic STEVE consolidations. Small B/L pleural effusions and mildly enlarged mediastinal lymph nodes were noted. ECG showed NSR with LBBB and RAD.  Pt was admitted for multifocal pneumonia in an immunocompromised pt.  Sputum cx were obtained.  He was started on IV abx and speech and Pulm were consulted.  Troponins were trended but his CP was felt to be related to the pneumonia.  Speech recommended soft/level 3 diet and honey thick liquids. Pulm recommended con't abx and narrow abx based on cultures.  BC neg x 2.  Sputum culture never able to be collected.  Abx were switched to PO Levaquin and pt was discharged home on 5/27/25.  Med list reviewed.    He has been doing well since returning well.  He notes no F/C and denies cough. He notes no recent SOB and has been able to walk the dog w/o SE.  He notes no SE with the Levaquin.  He is adding the thick it to his liquids and is eating solids OK.  He is avoiding steak and other difficult to chew foods.  He is leaving for [place] and is asking for an abx to take incase he develops resp symptoms while out of town.     Pt saw ID (Dr. Mike) 5/7/25 for his diarrhea  "and persistent Salmonella infection. He has had w/u for IE and Echo 5/12/25 showed EF 55-60% and his bovine bioprosthetic valve showed no vegetations.  Mild to mod MR was present. He finished tx and notes no current diarrhea or abd pain. As per pt he was told to f/u prn.       Review of Systems   Constitutional:  Negative for chills and fever.   HENT:  Negative for congestion and trouble swallowing.    Eyes:  Negative for pain and visual disturbance.   Respiratory:  Positive for shortness of breath. Negative for cough.    Cardiovascular:  Negative for chest pain, palpitations and leg swelling.   Gastrointestinal:  Negative for abdominal pain, diarrhea, nausea and vomiting.   Genitourinary:  Negative for difficulty urinating and dysuria.   Musculoskeletal:  Negative for arthralgias and gait problem.   Skin:  Negative for rash and wound.   Neurological:  Negative for dizziness and headaches.   Hematological:  Does not bruise/bleed easily.   Psychiatric/Behavioral:  Negative for confusion and dysphoric mood.      Objective   Ht 5' 10\" (1.778 m)   Wt 64.9 kg (143 lb)   BMI 20.52 kg/m²     Physical Exam  Vitals and nursing note reviewed.   Constitutional:       General: He is not in acute distress.     Appearance: He is not ill-appearing.   HENT:      Head: Normocephalic and atraumatic.     Eyes:      General:         Right eye: No discharge.         Left eye: No discharge.      Conjunctiva/sclera: Conjunctivae normal.     Pulmonary:      Effort: Pulmonary effort is normal. No respiratory distress.     Skin:     Coloration: Skin is not pale.      Findings: No rash.     Psychiatric:         Mood and Affect: Mood normal.         Behavior: Behavior normal.         Thought Content: Thought content normal.         Judgment: Judgment normal.       Medications have been reviewed by provider in current encounter    Administrative Statements   Encounter provider Radha Pettit, DO    The Patient is located at Home and in " the following state in which I hold an active license [place].    The patient was identified by name and date of birth. Gabe Pickard was informed that this is a telemedicine visit and that the visit is being conducted through the Epic Embedded platform. He agrees to proceed..  My office door was closed. No one else was in the room.  He acknowledged consent and understanding of privacy and security of the video platform. The patient has agreed to participate and understands they can discontinue the visit at any time.    I have spent a total time of 30 minutes in caring for this patient on the day of the visit/encounter including Diagnostic results, Prognosis, Risks and benefits of tx options, Instructions for management, Patient and family education, Importance of tx compliance, Risk factor reductions, Impressions, Counseling / Coordination of care, Documenting in the medical record, Reviewing/placing orders in the medical record (including tests, medications, and/or procedures), and Obtaining or reviewing history  , not including the time spent for establishing the audio/video connection.    Radha Pettit, DO

## 2025-05-29 NOTE — ASSESSMENT & PLAN NOTE
Has 2 more days  of Levaquin left, finish abx as directed and call with relapse in symptoms, will send refill for Levaquin over ONLY to use prn while in [place] if resp symptoms develop  Orders:    levofloxacin (LEVAQUIN) 500 mg tablet; Take 1 tablet (500 mg total) by mouth every 24 hours for 5 days

## 2025-05-29 NOTE — ASSESSMENT & PLAN NOTE
Wt Readings from Last 3 Encounters:   05/29/25 64.9 kg (143 lb)   05/12/25 67.1 kg (148 lb)   05/01/25 67.5 kg (148 lb 12.8 oz)   No current s/sx of decompensation, BNP up a bit while IP but currently w/o acute symptoms, on Jardiance and Metoprolol succ

## 2025-05-29 NOTE — UTILIZATION REVIEW
NOTIFICATION OF ADMISSION DISCHARGE   This is a Notification of Discharge from [facility]. Please be advised that this patient has been discharge from our facility. Below you will find the admission and discharge date and time including the patient’s disposition.   UTILIZATION REVIEW CONTACT:  Utilization Review Assistants  Network Utilization Review Department  Phone: [phone] x carefully listen to the prompts. All voicemails are confidential.  Email: [email or web address]     ADMISSION INFORMATION  PRESENTATION DATE: 5/26/2025  7:02 AM  OBERVATION ADMISSION DATE: N/A  INPATIENT ADMISSION DATE: 5/26/25 12:08 PM   DISCHARGE DATE: 5/27/2025  3:06 PM   DISPOSITION:Home/Self Care    Network Utilization Review Department  ATTENTION: Please call with any questions or concerns to [phone] and carefully listen to the prompts so that you are directed to the right person. All voicemails are confidential.   For Discharge needs, contact Care Management DC Support Team at [phone] opt. 2  Send all requests for admission clinical reviews, approved or denied determinations and any other requests to dedicated fax number below belonging to the campus where the patient is receiving treatment. List of dedicated fax numbers for the Facilities:  FACILITY NAME UR FAX NUMBER   ADMISSION DENIALS (Administrative/Medical Necessity) [phone]   DISCHARGE SUPPORT TEAM ([facility]) [phone]   PARENT CHILD HEALTH (Maternity/NICU/Pediatrics) [phone]   [facility] [phone]   [facility] [phone]   [facility] [phone]   General acute hospital [phone]   [facility] [phone]   [facility] [phone]   [facility] [phone]   [facility] [phone]   [facility]  [facility] [phone]   [facility] [phone]   [facility] [phone]   [facility] [phone]

## 2025-05-29 NOTE — ASSESSMENT & PLAN NOTE
Has seen ID and had Echo recently d/t persistent Salmonella, no valve vegetation noted, was told f/u with ID prn

## 2025-05-29 NOTE — ASSESSMENT & PLAN NOTE
Currently only taking PPI once daily, notes no reflux symptoms with decrease to q day, med list updated, call with relapse in symptoms, last EGD 1/24   Orders:    omeprazole (PriLOSEC) 40 MG capsule; Take 1 capsule (40 mg total) by mouth daily

## 2025-05-29 NOTE — ASSESSMENT & PLAN NOTE
Currently only taking PPI once daily, notes no reflux symptoms with decrease to q day, med list updated, call with relapse in symptoms   Orders:    omeprazole (PriLOSEC) 40 MG capsule; Take 1 capsule (40 mg total) by mouth daily

## 2025-06-01 LAB
BACTERIA BLD CULT: NORMAL
BACTERIA BLD CULT: NORMAL

## 2025-06-09 ENCOUNTER — OFFICE VISIT (OUTPATIENT)
Age: 80
End: 2025-06-09
Payer: COMMERCIAL

## 2025-06-09 VITALS
TEMPERATURE: 97.4 F | OXYGEN SATURATION: 95 % | DIASTOLIC BLOOD PRESSURE: 62 MMHG | SYSTOLIC BLOOD PRESSURE: 124 MMHG | HEIGHT: 70 IN | WEIGHT: 147 LBS | BODY MASS INDEX: 21.05 KG/M2 | HEART RATE: 68 BPM

## 2025-06-09 DIAGNOSIS — R93.89 ABNORMAL CHEST CT: ICD-10-CM

## 2025-06-09 DIAGNOSIS — J18.9 MULTIFOCAL PNEUMONIA: Primary | ICD-10-CM

## 2025-06-09 PROCEDURE — 99214 OFFICE O/P EST MOD 30 MIN: CPT | Performed by: NURSE PRACTITIONER

## 2025-06-09 NOTE — ASSESSMENT & PLAN NOTE
History of recurrent pneumonia 2/2 immunosuppression while on chemotherapy for multiple myeloma; also has CVID on IVIG. He is also at risk for aspiration - swallowing study in August 2024 did identify both aspiration and penetration. He is currently improved and not identifying any infectious or active pulmonary symptoms. He has completed all remaining doses of antibiotic as prescribed.    I think it is prudent to repeat CT chest in 3 months given his history of waxing and waning nodules.  He has started to thicken his liquids which was recommended in the past with his swallowing study  I will see him after the CT so we can review those results.  Orders:    CT chest without contrast; Future

## 2025-06-09 NOTE — ASSESSMENT & PLAN NOTE
Tree-in-bud nodularity superimposed on chronic left upper and lower lobe consolidation. This points to probable aspiration as an underlying cause and we did review in today's visit in some detail.  Orders:    CT chest without contrast; Future

## 2025-06-09 NOTE — ASSESSMENT & PLAN NOTE
Addended by: BI FERREIRA on: 6/9/2025 10:59 AM     Modules accepted: Orders     Had recent PET as per IP record - no copy in chart, pt states he will make f/u appt with Heme/Onc for further eval

## 2025-06-09 NOTE — PROGRESS NOTES
Follow-up  Visit - Pulmonary Medicine   Name: Gabe Pickard      : 1945      MRN: [id number]  Encounter Provider: DUNG Keene  Encounter Date: 2025   Encounter department: [facility]  :  Assessment & Plan  Multifocal pneumonia  History of recurrent pneumonia 2/2 immunosuppression while on chemotherapy for multiple myeloma; also has CVID on IVIG. He is also at risk for aspiration - swallowing study in 2024 did identify both aspiration and penetration. He is currently improved and not identifying any infectious or active pulmonary symptoms. He has completed all remaining doses of antibiotic as prescribed.    I think it is prudent to repeat CT chest in 3 months given his history of waxing and waning nodules.  He has started to thicken his liquids which was recommended in the past with his swallowing study  I will see him after the CT so we can review those results.  Orders:    CT chest without contrast; Future    Abnormal chest CT  Tree-in-bud nodularity superimposed on chronic left upper and lower lobe consolidation. This points to probable aspiration as an underlying cause and we did review in today's visit in some detail.  Orders:    CT chest without contrast; Future      Return in about 3 months (around 2025).    History of Present Illness   Gabe Pickard is a 79 y.o. male who presents for follow up. He was recently admitted 25-25 at the hospital for multifocal pneumonia, felt to be recurrent in the setting of dysphagia and immunosuppression due to IVIG transfusions for CVID  / chemo for multiple myeloma. He was treated with IV Zosyn and Vanco, discharged on Levaquin. Not felt to be in COPD AE.    Today he denies any cough, wheeze, shortness of breath. Feels his energy level back to normal. No sleep disturbance reported. He has started thickening his liquids and feels that to be helpful although he does not enjoy the altered flavor. No  "fever or chills, body ache, or malaise.    Review of Systems  Please note that a 14-point review of systems was performed to include Constitutional, HEENT, Respiratory, CVS, GI, , Musculoskeletal, Integumentary, Neurologic, Rheumatologic, Endocrinologic, Psychiatric, Lymphatic, and Hematologic/Oncologic systems were reviewed and are negative unless otherwise stated in HPI. Positive and negative findings pertinent to this evaluation are incorporated into the history of present illness.       Medications Ordered Prior to Encounter[1]   Social History[2]     Medical History Reviewed by provider this encounter:     .    Objective   /62 (BP Location: Left arm, Patient Position: Sitting, Cuff Size: Standard)   Pulse 68   Temp (!) 97.4 °F (36.3 °C) (Tympanic Core)   Ht 5' 10\" (1.778 m)   Wt 66.7 kg (147 lb)   SpO2 95%   BMI 21.09 kg/m²     Physical Exam  Vitals reviewed.   Constitutional:       Appearance: Normal appearance.   HENT:      Head: Normocephalic.      Nose: Nose normal.      Mouth/Throat:      Mouth: Mucous membranes are moist.      Pharynx: Oropharynx is clear.     Cardiovascular:      Rate and Rhythm: Normal rate and regular rhythm.      Pulses: Normal pulses.      Heart sounds: Normal heart sounds.   Pulmonary:      Effort: Pulmonary effort is normal.      Breath sounds: Normal breath sounds.     Musculoskeletal:         General: Normal range of motion.     Skin:     General: Skin is warm.      Capillary Refill: Capillary refill takes less than 2 seconds.     Neurological:      General: No focal deficit present.      Mental Status: He is alert and oriented to person, place, and time.     Psychiatric:         Mood and Affect: Mood normal.         Behavior: Behavior normal.         Diagnostic Data:  Labs: I personally reviewed the most recent laboratory data pertinent to today's visit.  Lab Results   Component Value Date    WBC 8.38 05/27/2025    HGB 12.6 05/27/2025    HCT 39.5 05/27/2025    MCV " "85 05/27/2025     05/27/2025    EOSPCT 2 05/27/2025    EOSABS 0.13 05/27/2025    SEGSPCT 85 (H) 02/14/2025    MONOPCT 10 05/27/2025    MONOABS 0.74 02/14/2025    BANDSPCT 13 (H) 02/13/2025     Lab Results   Component Value Date    GLUCOSE 204 (H) 10/13/2017    CALCIUM 8.6 05/27/2025     05/19/2017    K 3.6 05/27/2025    CO2 27 05/27/2025     05/27/2025    BUN 12 05/27/2025    CREATININE 0.72 05/27/2025     No results found for: \"IGE\", \"RAST\"  Lab Results   Component Value Date    ALT 17 05/26/2025    AST 18 05/26/2025    ALKPHOS 122 (H) 05/26/2025    BILITOT 0.9 05/19/2017         Radiology results:  Radiology Results Review: I have reviewed radiology reports from PACS including: CT chest and Echocardiogram.  CT Chest 5/26/25  New left upper and lower lobe tree-in-bud nodularity/ opacities, suspicious for pneumonia superimposed on chronic left upper and lower lobe consolidation. Waxing and waning nature could suggest aspiration as an etiology. Short interval follow-up CT in 3   should be considered to ensure resolution and exclude an underlying mass     Small bilateral pleural effusions.     Mildly enlarged mediastinal lymph nodes, likely reactive.    ECHO 5/12/25    Left Ventricle: Left ventricular cavity size is normal. Wall thickness is normal. The left ventricular ejection fraction is 55-60%. Systolic function is normal. Global longitudinal strain is reduced at -13%. Although no diagnostic regional wall motion abnormality was identified, this possibility cannot be completely excluded on the basis of this study.    IVS: There is abnormal septal motion consistent with left bundle branch block.    Right Ventricle: Right ventricular cavity size is normal. Systolic function is normal.    Left Atrium: The atrium is mildly dilated (35-41 mL/m2).    Right Atrium: The atrium is dilated.    Aortic Valve: There is a bovine bioprosthetic valve. The prosthetic valve appears well-seated and appears to be " functioning normally. There is no evidence of transvalvular regurgitation. The gradient recorded across the prosthetic aortic valve is within the expected range. AV mean gradient is 11 mmHg.    Mitral Valve: There is moderate annular calcification. There is mild to moderate regurgitation.    Tricuspid Valve: There is moderate regurgitation.    Pulmonic Valve: There is mild regurgitation.    Prior TTE study available for comparison. Prior study date: 12/31/2024. No significant changes noted compared to the prior study.         DUNG Keene           [1]   Current Outpatient Medications on File Prior to Visit   Medication Sig Dispense Refill    aspirin 81 MG tablet Take 81 mg by mouth in the morning.      atorvastatin (LIPITOR) 40 mg tablet TAKE 1 TABLET BY MOUTH EVERY DAY 90 tablet 1    cholecalciferol (VITAMIN D3) 1,000 units tablet Take 2 tablets (2,000 Units total) by mouth daily 60 tablet 0    Coenzyme Q10 (CoQ-10) 100 MG capsule       ferrous sulfate 324 (65 Fe) mg TAKE 1 TABLET BY MOUTH EVERY DAY WITH BREAKFAST 90 tablet 0    furosemide (LASIX) 20 mg tablet Take 1 tablet (20 mg total) by mouth daily      glucose blood test strip       IMMUNE GLOBULIN, HUMAN, IV Inject 30,000 mg into a catheter in a vein Every 2 mos      Jardiance 25 MG TABS TAKE 1 TABLET BY MOUTH EVERY MORNING 90 tablet 1    metoprolol succinate (TOPROL-XL) 100 mg 24 hr tablet TAKE 1 TABLET BY MOUTH EVERY DAY 90 tablet 1    montelukast (SINGULAIR) 10 mg tablet TAKE 1 TABLET BY MOUTH EVERY DAY 90 tablet 1    multivitamin-minerals (CENTRUM ADULTS) tablet Take 1 tablet by mouth daily  0    omeprazole (PriLOSEC) 40 MG capsule Take 1 capsule (40 mg total) by mouth daily      ondansetron (ZOFRAN-ODT) 4 mg disintegrating tablet Take 1 tablet (4 mg total) by mouth every 6 (six) hours as needed for nausea or vomiting 20 tablet 0    valACYclovir (VALTREX) 500 mg tablet Take 500 mg by mouth in the morning.       No current facility-administered  "medications on file prior to visit.   [2]   Social History  Tobacco Use    Smoking status: Former     Current packs/day: 0.00     Average packs/day: 1 pack/day for 42.0 years (42.0 ttl pk-yrs)     Types: Cigarettes     Start date:      Quit date: 2009     Years since quittin.4    Smokeless tobacco: Never   Vaping Use    Vaping status: Never Used   Substance and Sexual Activity    Alcohol use: Yes     Alcohol/week: 2.0 standard drinks of alcohol     Types: 2 Cans of beer per week     Comment: very rare \"beer here and there\"    Drug use: No    Sexual activity: Not Currently     Partners: Female     Birth control/protection: None     "

## 2025-06-19 LAB
ALBUMIN SERPL-MCNC: 3.9 G/DL (ref 3.8–4.8)
ALBUMIN/CREAT UR: 43 MG/G CREAT (ref 0–29)
ALP SERPL-CCNC: 138 IU/L (ref 44–121)
ALT SERPL-CCNC: 12 IU/L (ref 0–44)
AST SERPL-CCNC: 19 IU/L (ref 0–40)
BILIRUB SERPL-MCNC: 0.3 MG/DL (ref 0–1.2)
BUN SERPL-MCNC: 11 MG/DL (ref 8–27)
BUN/CREAT SERPL: 12 (ref 10–24)
CALCIUM SERPL-MCNC: 8.9 MG/DL (ref 8.6–10.2)
CHLORIDE SERPL-SCNC: 101 MMOL/L (ref 96–106)
CHOLEST SERPL-MCNC: 64 MG/DL (ref 100–199)
CHOLEST/HDLC SERPL: 2.6 RATIO (ref 0–5)
CO2 SERPL-SCNC: 27 MMOL/L (ref 20–29)
CREAT SERPL-MCNC: 0.9 MG/DL (ref 0.76–1.27)
CREAT UR-MCNC: 58.7 MG/DL
EGFR: 87 ML/MIN/1.73
EST. AVERAGE GLUCOSE BLD GHB EST-MCNC: 120 MG/DL
GLOBULIN SER-MCNC: 1.7 G/DL (ref 1.5–4.5)
GLUCOSE SERPL-MCNC: 96 MG/DL (ref 70–99)
HBA1C MFR BLD: 5.8 % (ref 4.8–5.6)
HDLC SERPL-MCNC: 25 MG/DL
LDLC SERPL CALC-MCNC: 23 MG/DL (ref 0–99)
MICROALBUMIN UR-MCNC: 25.3 UG/ML
POTASSIUM SERPL-SCNC: 3.5 MMOL/L (ref 3.5–5.2)
PROT SERPL-MCNC: 5.6 G/DL (ref 6–8.5)
SL AMB VLDL CHOLESTEROL CALC: 16 MG/DL (ref 5–40)
SODIUM SERPL-SCNC: 143 MMOL/L (ref 134–144)
TRIGL SERPL-MCNC: 70 MG/DL (ref 0–149)

## 2025-06-21 DIAGNOSIS — I25.10 CORONARY ARTERY DISEASE INVOLVING NATIVE CORONARY ARTERY OF NATIVE HEART WITHOUT ANGINA PECTORIS: ICD-10-CM

## 2025-06-22 RX ORDER — ATORVASTATIN CALCIUM 40 MG/1
40 TABLET, FILM COATED ORAL DAILY
Qty: 90 TABLET | Refills: 1 | Status: SHIPPED | OUTPATIENT
Start: 2025-06-22

## 2025-06-25 PROBLEM — J18.9 MULTIFOCAL PNEUMONIA: Status: RESOLVED | Noted: 2023-03-23 | Resolved: 2025-06-25

## 2025-07-04 ENCOUNTER — APPOINTMENT (EMERGENCY)
Dept: RADIOLOGY | Facility: HOSPITAL | Age: 80
End: 2025-07-04
Payer: COMMERCIAL

## 2025-07-04 ENCOUNTER — HOSPITAL ENCOUNTER (INPATIENT)
Facility: HOSPITAL | Age: 80
LOS: 2 days | Discharge: HOME | End: 2025-07-06
Attending: EMERGENCY MEDICINE
Payer: COMMERCIAL

## 2025-07-04 DIAGNOSIS — I50.9 CHF (CONGESTIVE HEART FAILURE) (HCC): ICD-10-CM

## 2025-07-04 DIAGNOSIS — J18.9 PNEUMONIA: ICD-10-CM

## 2025-07-04 DIAGNOSIS — J10.1 INFLUENZA A: Primary | ICD-10-CM

## 2025-07-04 LAB
2HR DELTA HS TROPONIN: 2 NG/L
ALBUMIN SERPL BCG-MCNC: 3.7 G/DL (ref 3.5–5)
ALP SERPL-CCNC: 111 U/L (ref 34–104)
ALT SERPL W P-5'-P-CCNC: 11 U/L (ref 7–52)
ANION GAP SERPL CALCULATED.3IONS-SCNC: 10 MMOL/L (ref 4–13)
APTT PPP: 33 SECONDS (ref 23–34)
AST SERPL W P-5'-P-CCNC: 15 U/L (ref 13–39)
BACTERIA UR QL AUTO: NORMAL /HPF
BASOPHILS # BLD AUTO: 0.06 THOUSANDS/ÂΜL (ref 0–0.1)
BASOPHILS NFR BLD AUTO: 1 % (ref 0–1)
BILIRUB SERPL-MCNC: 0.45 MG/DL (ref 0.2–1)
BILIRUB UR QL STRIP: NEGATIVE
BNP SERPL-MCNC: 931 PG/ML (ref 0–100)
BUN SERPL-MCNC: 12 MG/DL (ref 5–25)
CALCIUM SERPL-MCNC: 8.8 MG/DL (ref 8.4–10.2)
CARDIAC TROPONIN I PNL SERPL HS: 17 NG/L (ref ?–50)
CARDIAC TROPONIN I PNL SERPL HS: 19 NG/L (ref ?–50)
CHLORIDE SERPL-SCNC: 99 MMOL/L (ref 96–108)
CLARITY UR: CLEAR
CO2 SERPL-SCNC: 26 MMOL/L (ref 21–32)
COLOR UR: YELLOW
CREAT SERPL-MCNC: 0.92 MG/DL (ref 0.6–1.3)
EOSINOPHIL # BLD AUTO: 0.02 THOUSAND/ÂΜL (ref 0–0.61)
EOSINOPHIL NFR BLD AUTO: 0 % (ref 0–6)
ERYTHROCYTE [DISTWIDTH] IN BLOOD BY AUTOMATED COUNT: 15.8 % (ref 11.6–15.1)
FLUAV RNA RESP QL NAA+PROBE: POSITIVE
FLUBV RNA RESP QL NAA+PROBE: NEGATIVE
GFR SERPL CREATININE-BSD FRML MDRD: 79 ML/MIN/1.73SQ M
GLUCOSE SERPL-MCNC: 187 MG/DL (ref 65–140)
GLUCOSE UR STRIP-MCNC: ABNORMAL MG/DL
HCT VFR BLD AUTO: 41.3 % (ref 36.5–49.3)
HGB BLD-MCNC: 12.8 G/DL (ref 12–17)
HGB UR QL STRIP.AUTO: ABNORMAL
IMM GRANULOCYTES # BLD AUTO: 0.05 THOUSAND/UL (ref 0–0.2)
IMM GRANULOCYTES NFR BLD AUTO: 1 % (ref 0–2)
INR PPP: 1.23 (ref 0.85–1.19)
KETONES UR STRIP-MCNC: NEGATIVE MG/DL
LACTATE SERPL-SCNC: 1.7 MMOL/L (ref 0.5–2)
LEUKOCYTE ESTERASE UR QL STRIP: NEGATIVE
LYMPHOCYTES # BLD AUTO: 0.59 THOUSANDS/ÂΜL (ref 0.6–4.47)
LYMPHOCYTES NFR BLD AUTO: 7 % (ref 14–44)
MCH RBC QN AUTO: 25.9 PG (ref 26.8–34.3)
MCHC RBC AUTO-ENTMCNC: 31 G/DL (ref 31.4–37.4)
MCV RBC AUTO: 83 FL (ref 82–98)
MONOCYTES # BLD AUTO: 0.55 THOUSAND/ÂΜL (ref 0.17–1.22)
MONOCYTES NFR BLD AUTO: 7 % (ref 4–12)
NEUTROPHILS # BLD AUTO: 7.14 THOUSANDS/ÂΜL (ref 1.85–7.62)
NEUTS SEG NFR BLD AUTO: 84 % (ref 43–75)
NITRITE UR QL STRIP: NEGATIVE
NON-SQ EPI CELLS URNS QL MICRO: NORMAL /HPF
NRBC BLD AUTO-RTO: 0 /100 WBCS
PH UR STRIP.AUTO: 5.5 [PH]
PLATELET # BLD AUTO: 177 THOUSANDS/UL (ref 149–390)
PMV BLD AUTO: 9.9 FL (ref 8.9–12.7)
POTASSIUM SERPL-SCNC: 3.3 MMOL/L (ref 3.5–5.3)
PROCALCITONIN SERPL-MCNC: 0.28 NG/ML
PROT SERPL-MCNC: 6.4 G/DL (ref 6.4–8.4)
PROT UR STRIP-MCNC: ABNORMAL MG/DL
PROTHROMBIN TIME: 16 SECONDS (ref 12.3–15)
RBC # BLD AUTO: 4.95 MILLION/UL (ref 3.88–5.62)
RBC #/AREA URNS AUTO: NORMAL /HPF
RSV RNA RESP QL NAA+PROBE: NEGATIVE
SARS-COV-2 RNA RESP QL NAA+PROBE: NEGATIVE
SODIUM SERPL-SCNC: 135 MMOL/L (ref 135–147)
SP GR UR STRIP.AUTO: 1.01 (ref 1–1.03)
UROBILINOGEN UR STRIP-ACNC: <2 MG/DL
WBC # BLD AUTO: 8.41 THOUSAND/UL (ref 4.31–10.16)
WBC #/AREA URNS AUTO: NORMAL /HPF

## 2025-07-04 PROCEDURE — 83605 ASSAY OF LACTIC ACID: CPT | Performed by: EMERGENCY MEDICINE

## 2025-07-04 PROCEDURE — 85610 PROTHROMBIN TIME: CPT | Performed by: EMERGENCY MEDICINE

## 2025-07-04 PROCEDURE — 85730 THROMBOPLASTIN TIME PARTIAL: CPT | Performed by: EMERGENCY MEDICINE

## 2025-07-04 PROCEDURE — 80053 COMPREHEN METABOLIC PANEL: CPT | Performed by: EMERGENCY MEDICINE

## 2025-07-04 PROCEDURE — 96375 TX/PRO/DX INJ NEW DRUG ADDON: CPT

## 2025-07-04 PROCEDURE — 0241U HB NFCT DS VIR RESP RNA 4 TRGT: CPT | Performed by: EMERGENCY MEDICINE

## 2025-07-04 PROCEDURE — 81001 URINALYSIS AUTO W/SCOPE: CPT | Performed by: EMERGENCY MEDICINE

## 2025-07-04 PROCEDURE — 99285 EMERGENCY DEPT VISIT HI MDM: CPT | Performed by: EMERGENCY MEDICINE

## 2025-07-04 PROCEDURE — 99222 1ST HOSP IP/OBS MODERATE 55: CPT | Performed by: INTERNAL MEDICINE

## 2025-07-04 PROCEDURE — 93005 ELECTROCARDIOGRAM TRACING: CPT

## 2025-07-04 PROCEDURE — 87040 BLOOD CULTURE FOR BACTERIA: CPT | Performed by: EMERGENCY MEDICINE

## 2025-07-04 PROCEDURE — 36415 COLL VENOUS BLD VENIPUNCTURE: CPT | Performed by: EMERGENCY MEDICINE

## 2025-07-04 PROCEDURE — 99285 EMERGENCY DEPT VISIT HI MDM: CPT

## 2025-07-04 PROCEDURE — 71045 X-RAY EXAM CHEST 1 VIEW: CPT

## 2025-07-04 PROCEDURE — 83880 ASSAY OF NATRIURETIC PEPTIDE: CPT | Performed by: EMERGENCY MEDICINE

## 2025-07-04 PROCEDURE — 84484 ASSAY OF TROPONIN QUANT: CPT | Performed by: EMERGENCY MEDICINE

## 2025-07-04 PROCEDURE — 84145 PROCALCITONIN (PCT): CPT | Performed by: EMERGENCY MEDICINE

## 2025-07-04 PROCEDURE — 85025 COMPLETE CBC W/AUTO DIFF WBC: CPT | Performed by: EMERGENCY MEDICINE

## 2025-07-04 PROCEDURE — 96365 THER/PROPH/DIAG IV INF INIT: CPT

## 2025-07-04 RX ORDER — PANTOPRAZOLE SODIUM 40 MG/1
40 TABLET, DELAYED RELEASE ORAL
Status: DISCONTINUED | OUTPATIENT
Start: 2025-07-05 | End: 2025-07-06 | Stop reason: HOSPADM

## 2025-07-04 RX ORDER — OSELTAMIVIR PHOSPHATE 75 MG/1
75 CAPSULE ORAL EVERY 12 HOURS SCHEDULED
Status: DISCONTINUED | OUTPATIENT
Start: 2025-07-04 | End: 2025-07-06 | Stop reason: HOSPADM

## 2025-07-04 RX ORDER — BENZONATATE 100 MG/1
100 CAPSULE ORAL 3 TIMES DAILY PRN
Status: DISCONTINUED | OUTPATIENT
Start: 2025-07-04 | End: 2025-07-06 | Stop reason: HOSPADM

## 2025-07-04 RX ORDER — FUROSEMIDE 20 MG/1
20 TABLET ORAL DAILY
Status: DISCONTINUED | OUTPATIENT
Start: 2025-07-05 | End: 2025-07-06 | Stop reason: HOSPADM

## 2025-07-04 RX ORDER — HEPARIN SODIUM 5000 [USP'U]/ML
5000 INJECTION, SOLUTION INTRAVENOUS; SUBCUTANEOUS EVERY 8 HOURS SCHEDULED
Status: DISCONTINUED | OUTPATIENT
Start: 2025-07-05 | End: 2025-07-06 | Stop reason: HOSPADM

## 2025-07-04 RX ORDER — ACETAMINOPHEN 325 MG/1
650 TABLET ORAL EVERY 6 HOURS PRN
Status: DISCONTINUED | OUTPATIENT
Start: 2025-07-04 | End: 2025-07-06 | Stop reason: HOSPADM

## 2025-07-04 RX ORDER — MONTELUKAST SODIUM 10 MG/1
10 TABLET ORAL DAILY
Status: DISCONTINUED | OUTPATIENT
Start: 2025-07-05 | End: 2025-07-06 | Stop reason: HOSPADM

## 2025-07-04 RX ORDER — VALACYCLOVIR HYDROCHLORIDE 500 MG/1
500 TABLET, FILM COATED ORAL DAILY
Status: DISCONTINUED | OUTPATIENT
Start: 2025-07-05 | End: 2025-07-06 | Stop reason: HOSPADM

## 2025-07-04 RX ORDER — GUAIFENESIN 600 MG/1
600 TABLET, EXTENDED RELEASE ORAL EVERY 12 HOURS SCHEDULED
Status: DISCONTINUED | OUTPATIENT
Start: 2025-07-04 | End: 2025-07-06 | Stop reason: HOSPADM

## 2025-07-04 RX ORDER — FERROUS SULFATE 325(65) MG
325 TABLET ORAL
Status: DISCONTINUED | OUTPATIENT
Start: 2025-07-05 | End: 2025-07-06 | Stop reason: HOSPADM

## 2025-07-04 RX ORDER — METOPROLOL SUCCINATE 50 MG/1
100 TABLET, EXTENDED RELEASE ORAL DAILY
Status: DISCONTINUED | OUTPATIENT
Start: 2025-07-05 | End: 2025-07-06 | Stop reason: HOSPADM

## 2025-07-04 RX ORDER — FUROSEMIDE 10 MG/ML
40 INJECTION INTRAMUSCULAR; INTRAVENOUS ONCE
Status: COMPLETED | OUTPATIENT
Start: 2025-07-04 | End: 2025-07-04

## 2025-07-04 RX ORDER — ASPIRIN 81 MG/1
81 TABLET ORAL DAILY
Status: DISCONTINUED | OUTPATIENT
Start: 2025-07-05 | End: 2025-07-06 | Stop reason: HOSPADM

## 2025-07-04 RX ORDER — ONDANSETRON 2 MG/ML
4 INJECTION INTRAMUSCULAR; INTRAVENOUS EVERY 6 HOURS PRN
Status: DISCONTINUED | OUTPATIENT
Start: 2025-07-04 | End: 2025-07-04

## 2025-07-04 RX ORDER — ACETAMINOPHEN 325 MG/1
975 TABLET ORAL ONCE
Status: COMPLETED | OUTPATIENT
Start: 2025-07-04 | End: 2025-07-04

## 2025-07-04 RX ORDER — ATORVASTATIN CALCIUM 40 MG/1
40 TABLET, FILM COATED ORAL DAILY
Status: DISCONTINUED | OUTPATIENT
Start: 2025-07-05 | End: 2025-07-06 | Stop reason: HOSPADM

## 2025-07-04 RX ADMIN — ACETAMINOPHEN 975 MG: 325 TABLET ORAL at 21:32

## 2025-07-04 RX ADMIN — PIPERACILLIN AND TAZOBACTAM 4.5 G: 4; .5 INJECTION, POWDER, FOR SOLUTION INTRAVENOUS at 22:05

## 2025-07-04 RX ADMIN — FUROSEMIDE 40 MG: 10 INJECTION, SOLUTION INTRAVENOUS at 22:05

## 2025-07-05 LAB
ANION GAP SERPL CALCULATED.3IONS-SCNC: 8 MMOL/L (ref 4–13)
ATRIAL RATE: 90 BPM
BUN SERPL-MCNC: 10 MG/DL (ref 5–25)
CALCIUM SERPL-MCNC: 8.6 MG/DL (ref 8.4–10.2)
CHLORIDE SERPL-SCNC: 99 MMOL/L (ref 96–108)
CO2 SERPL-SCNC: 32 MMOL/L (ref 21–32)
CREAT SERPL-MCNC: 0.94 MG/DL (ref 0.6–1.3)
ERYTHROCYTE [DISTWIDTH] IN BLOOD BY AUTOMATED COUNT: 15.9 % (ref 11.6–15.1)
GFR SERPL CREATININE-BSD FRML MDRD: 77 ML/MIN/1.73SQ M
GLUCOSE SERPL-MCNC: 109 MG/DL (ref 65–140)
GLUCOSE SERPL-MCNC: 117 MG/DL (ref 65–140)
GLUCOSE SERPL-MCNC: 117 MG/DL (ref 65–140)
GLUCOSE SERPL-MCNC: 122 MG/DL (ref 65–140)
GLUCOSE SERPL-MCNC: 97 MG/DL (ref 65–140)
HCT VFR BLD AUTO: 41.8 % (ref 36.5–49.3)
HGB BLD-MCNC: 13.1 G/DL (ref 12–17)
L PNEUMO1 AG UR QL IA.RAPID: NEGATIVE
MAGNESIUM SERPL-MCNC: 2.1 MG/DL (ref 1.9–2.7)
MCH RBC QN AUTO: 25.7 PG (ref 26.8–34.3)
MCHC RBC AUTO-ENTMCNC: 31.3 G/DL (ref 31.4–37.4)
MCV RBC AUTO: 82 FL (ref 82–98)
P AXIS: 259 DEGREES
PLATELET # BLD AUTO: 179 THOUSANDS/UL (ref 149–390)
PMV BLD AUTO: 10.2 FL (ref 8.9–12.7)
POTASSIUM SERPL-SCNC: 3.2 MMOL/L (ref 3.5–5.3)
PR INTERVAL: 240 MS
PROCALCITONIN SERPL-MCNC: 0.32 NG/ML
QRS AXIS: 79 DEGREES
QRSD INTERVAL: 138 MS
QT INTERVAL: 410 MS
QTC INTERVAL: 501 MS
RBC # BLD AUTO: 5.09 MILLION/UL (ref 3.88–5.62)
S PNEUM AG UR QL: NEGATIVE
SODIUM SERPL-SCNC: 139 MMOL/L (ref 135–147)
T WAVE AXIS: 75 DEGREES
VENTRICULAR RATE: 90 BPM
WBC # BLD AUTO: 6.8 THOUSAND/UL (ref 4.31–10.16)

## 2025-07-05 PROCEDURE — 93010 ELECTROCARDIOGRAM REPORT: CPT | Performed by: INTERNAL MEDICINE

## 2025-07-05 PROCEDURE — 87205 SMEAR GRAM STAIN: CPT | Performed by: INTERNAL MEDICINE

## 2025-07-05 PROCEDURE — 87449 NOS EACH ORGANISM AG IA: CPT | Performed by: INTERNAL MEDICINE

## 2025-07-05 PROCEDURE — 99232 SBSQ HOSP IP/OBS MODERATE 35: CPT | Performed by: INTERNAL MEDICINE

## 2025-07-05 PROCEDURE — 82948 REAGENT STRIP/BLOOD GLUCOSE: CPT

## 2025-07-05 PROCEDURE — 83735 ASSAY OF MAGNESIUM: CPT | Performed by: INTERNAL MEDICINE

## 2025-07-05 PROCEDURE — 87106 FUNGI IDENTIFICATION YEAST: CPT | Performed by: INTERNAL MEDICINE

## 2025-07-05 PROCEDURE — 85027 COMPLETE CBC AUTOMATED: CPT | Performed by: INTERNAL MEDICINE

## 2025-07-05 PROCEDURE — 87070 CULTURE OTHR SPECIMN AEROBIC: CPT | Performed by: INTERNAL MEDICINE

## 2025-07-05 PROCEDURE — 80048 BASIC METABOLIC PNL TOTAL CA: CPT | Performed by: INTERNAL MEDICINE

## 2025-07-05 PROCEDURE — 84145 PROCALCITONIN (PCT): CPT | Performed by: INTERNAL MEDICINE

## 2025-07-05 RX ORDER — POTASSIUM CHLORIDE 1500 MG/1
40 TABLET, EXTENDED RELEASE ORAL ONCE
Status: COMPLETED | OUTPATIENT
Start: 2025-07-05 | End: 2025-07-05

## 2025-07-05 RX ORDER — ALBUTEROL SULFATE 90 UG/1
2 INHALANT RESPIRATORY (INHALATION) EVERY 4 HOURS PRN
Status: DISCONTINUED | OUTPATIENT
Start: 2025-07-05 | End: 2025-07-06 | Stop reason: HOSPADM

## 2025-07-05 RX ADMIN — FUROSEMIDE 20 MG: 20 TABLET ORAL at 07:49

## 2025-07-05 RX ADMIN — ATORVASTATIN CALCIUM 40 MG: 40 TABLET, FILM COATED ORAL at 07:50

## 2025-07-05 RX ADMIN — MONTELUKAST 10 MG: 10 TABLET, FILM COATED ORAL at 07:49

## 2025-07-05 RX ADMIN — ASPIRIN 81 MG: 81 TABLET, COATED ORAL at 07:50

## 2025-07-05 RX ADMIN — ACETAMINOPHEN 650 MG: 325 TABLET, FILM COATED ORAL at 14:41

## 2025-07-05 RX ADMIN — OSELTAMIVIR PHOSPHATE 75 MG: 75 CAPSULE ORAL at 07:50

## 2025-07-05 RX ADMIN — PIPERACILLIN AND TAZOBACTAM 4.5 G: 4; .5 INJECTION, POWDER, FOR SOLUTION INTRAVENOUS at 10:44

## 2025-07-05 RX ADMIN — OSELTAMIVIR PHOSPHATE 75 MG: 75 CAPSULE ORAL at 00:04

## 2025-07-05 RX ADMIN — HEPARIN SODIUM 5000 UNITS: 5000 INJECTION INTRAVENOUS; SUBCUTANEOUS at 21:35

## 2025-07-05 RX ADMIN — MULTIPLE VITAMINS W/ MINERALS TAB 1 TABLET: TAB ORAL at 07:49

## 2025-07-05 RX ADMIN — PIPERACILLIN AND TAZOBACTAM 4.5 G: 4; .5 INJECTION, POWDER, FOR SOLUTION INTRAVENOUS at 01:54

## 2025-07-05 RX ADMIN — ACETAMINOPHEN 650 MG: 325 TABLET, FILM COATED ORAL at 21:34

## 2025-07-05 RX ADMIN — ACETAMINOPHEN 650 MG: 325 TABLET, FILM COATED ORAL at 06:38

## 2025-07-05 RX ADMIN — PANTOPRAZOLE SODIUM 40 MG: 40 TABLET, DELAYED RELEASE ORAL at 05:39

## 2025-07-05 RX ADMIN — GUAIFENESIN 600 MG: 600 TABLET, EXTENDED RELEASE ORAL at 07:50

## 2025-07-05 RX ADMIN — FERROUS SULFATE TAB 325 MG (65 MG ELEMENTAL FE) 325 MG: 325 (65 FE) TAB at 07:50

## 2025-07-05 RX ADMIN — VALACYCLOVIR HYDROCHLORIDE 500 MG: 500 TABLET, FILM COATED ORAL at 07:49

## 2025-07-05 RX ADMIN — EMPAGLIFLOZIN 20 MG: 10 TABLET, FILM COATED ORAL at 07:50

## 2025-07-05 RX ADMIN — METOPROLOL SUCCINATE 100 MG: 50 TABLET, EXTENDED RELEASE ORAL at 07:49

## 2025-07-05 RX ADMIN — OSELTAMIVIR PHOSPHATE 75 MG: 75 CAPSULE ORAL at 21:35

## 2025-07-05 RX ADMIN — Medication 2000 UNITS: at 07:49

## 2025-07-05 RX ADMIN — PIPERACILLIN AND TAZOBACTAM 4.5 G: 4; .5 INJECTION, POWDER, FOR SOLUTION INTRAVENOUS at 18:19

## 2025-07-05 RX ADMIN — HEPARIN SODIUM 5000 UNITS: 5000 INJECTION INTRAVENOUS; SUBCUTANEOUS at 05:38

## 2025-07-05 RX ADMIN — GUAIFENESIN 600 MG: 600 TABLET, EXTENDED RELEASE ORAL at 00:04

## 2025-07-05 RX ADMIN — POTASSIUM CHLORIDE 40 MEQ: 1500 TABLET, EXTENDED RELEASE ORAL at 10:44

## 2025-07-05 RX ADMIN — HEPARIN SODIUM 5000 UNITS: 5000 INJECTION INTRAVENOUS; SUBCUTANEOUS at 14:30

## 2025-07-05 RX ADMIN — GUAIFENESIN 600 MG: 600 TABLET, EXTENDED RELEASE ORAL at 21:35

## 2025-07-05 NOTE — ED PROVIDER NOTES
Time reflects when diagnosis was documented in both MDM as applicable and the Disposition within this note       Time User Action Codes Description Comment    7/4/2025 10:28 PM Patel Wade [J10.1] Influenza A     7/4/2025 10:28 PM Patel Wade [J18.9] Pneumonia     7/4/2025 10:28 PM Patel Wade [I50.9] CHF (congestive heart failure) (HCC)           ED Disposition       ED Disposition   Admit    Condition   Stable    Date/Time   Fri Jul 4, 2025 10:28 PM    Comment   Case was discussed with JIM and the patient's admission status was agreed to be Admission Status: inpatient status to the service of Dr. Lu .               Assessment & Plan       Medical Decision Making  79-year-old male presenting with fever, cough and shortness of breath.  Obtain sepsis/cardiopulmonary evaluation of the labs, chest x-ray.  Patient influenza positive.  Suspect superimposed bacterial pneumonia.  Will administer broad-spectrum antibiotics.  Chest x-ray also with vascular congestion consistent with pulmonary edema.  Will administer Lasix.  Discussed case with Jim.  Will admit MedSurg telemetry.    Amount and/or Complexity of Data Reviewed  Labs: ordered.    Risk  OTC drugs.  Prescription drug management.  Decision regarding hospitalization.             Medications   acetaminophen (TYLENOL) tablet 975 mg (975 mg Oral Given 7/4/25 2132)   furosemide (LASIX) injection 40 mg (40 mg Intravenous Given 7/4/25 2205)   piperacillin-tazobactam (ZOSYN) IVPB 4.5 g (0 g Intravenous Stopped 7/4/25 2235)       ED Risk Strat Scores                    No data recorded        SBIRT 20yo+      Flowsheet Row Most Recent Value   Initial Alcohol Screen: US AUDIT-C     1. How often do you have a drink containing alcohol? 0 Filed at: 07/04/2025 2013   2. How many drinks containing alcohol do you have on a typical day you are drinking?  0 Filed at: 07/04/2025 2013   3a. Male UNDER 65: How often do you have five or more drinks on one occasion?  0 Filed at: 07/04/2025 2013   3b. FEMALE Any Age, or MALE 65+: How often do you have 4 or more drinks on one occassion? 0 Filed at: 07/04/2025 2013   Audit-C Score 0 Filed at: 07/04/2025 2013   BETO: How many times in the past year have you...    Used an illegal drug or used a prescription medication for non-medical reasons? Never Filed at: 07/04/2025 2013                            History of Present Illness       Chief Complaint   Patient presents with    Fever     Patient presents to ED with complaint of dyspnea, cough, fever, back pain, abd pain for the past two weeks. Pt returned from [place] today, he was there for 2 weeks. Pt currently receiving chemo for multiple myeloma.        Past Medical History[1]   Past Surgical History[2]   Family History[3]   Social History[4]   E-Cigarette/Vaping    E-Cigarette Use Never User       E-Cigarette/Vaping Substances    Nicotine No     THC No     CBD No     Flavoring No     Other No     Unknown No       I have reviewed and agree with the history as documented.     79-year-old male presents for evaluation of productive cough, fever and bodyaches that started last week.  Patient reports his wife had similar symptoms 2 weeks ago and then he started with symptoms shortly after.  Patient just returned from .  Patient reports prior history of pneumonia and feels similar.  Dyspnea with exertion.        Review of Systems   Constitutional:  Positive for fever.   Respiratory:  Positive for cough and shortness of breath.            Objective       ED Triage Vitals   Temperature Pulse Blood Pressure Respirations SpO2 Patient Position - Orthostatic VS   07/04/25 2012 07/04/25 2012 07/04/25 2012 07/04/25 2012 07/04/25 2012 07/04/25 2012   99.3 °F (37.4 °C) 90 149/67 18 95 % Sitting      Temp Source Heart Rate Source BP Location FiO2 (%) Pain Score    07/04/25 2311 07/04/25 2012 07/04/25 2012 -- 07/04/25 2012    Oral Monitor Left arm  2      Vitals      Date and Time Temp Pulse SpO2  Resp BP Pain Score FACES Pain Rating User   07/04/25 2314 -- 71 92 % -- 122/51 -- -- DII   07/04/25 2311 -- 73 93 % 16 -- No Pain -- NS   07/04/25 2311 98.5 °F (36.9 °C) -- -- -- 115/48 -- -- DII   07/04/25 2230 -- 79 93 % 17 139/66 -- -- LK   07/04/25 2215 -- 76 94 % 16 136/62 -- --    07/04/25 2145 -- 84 94 % 17 145/66 -- --    07/04/25 2132 -- -- -- -- -- Med Not Given for Pain - for MAR use only --    07/04/25 2130 -- 77 94 % 19 153/66 -- --    07/04/25 2115 -- 76 94 % 17 139/65 -- --    07/04/25 2030 -- -- -- -- -- 2 --    07/04/25 2012 99.3 °F (37.4 °C) 90 95 % 18 149/67 2 -- BS            Physical Exam  Vitals and nursing note reviewed.   Constitutional:       General: He is not in acute distress.     Appearance: He is well-developed.   HENT:      Head: Normocephalic and atraumatic.      Right Ear: External ear normal.      Left Ear: External ear normal.      Nose: Nose normal.     Eyes:      General: No scleral icterus.    Pulmonary:      Effort: Pulmonary effort is normal. No respiratory distress.      Comments: Scattered rhonchi  Abdominal:      General: There is no distension.      Palpations: Abdomen is soft.     Musculoskeletal:         General: No deformity. Normal range of motion.      Cervical back: Normal range of motion and neck supple.     Skin:     General: Skin is warm.      Findings: No rash.     Neurological:      General: No focal deficit present.      Mental Status: He is alert.      Gait: Gait normal.     Psychiatric:         Mood and Affect: Mood normal.         Results Reviewed       Procedure Component Value Units Date/Time    Urine Microscopic [[id number]]  (Normal) Collected: 07/04/25 2241    Lab Status: Final result Specimen: Urine, Clean Catch Updated: 07/04/25 2256     RBC, UA 0-1 /hpf      WBC, UA None Seen /hpf      Epithelial Cells None Seen /hpf      Bacteria, UA None Seen /hpf     UA w Reflex to Microscopic w Reflex to Culture [[id number]]  (Abnormal) Collected:  07/04/25 2241    Lab Status: Final result Specimen: Urine, Clean Catch Updated: 07/04/25 2256     Color, UA Yellow     Clarity, UA Clear     Specific Gravity, UA 1.015     pH, UA 5.5     Leukocytes, UA Negative     Nitrite, UA Negative     Protein, UA Trace mg/dl      Glucose, UA 1000 (1%) mg/dl      Ketones, UA Negative mg/dl      Urobilinogen, UA <2.0 mg/dl      Bilirubin, UA Negative     Occult Blood, UA Trace    HS Troponin I 2hr [[id number]]  (Normal) Collected: 07/04/25 2211    Lab Status: Final result Specimen: Blood from Arm, Right Updated: 07/04/25 2238     hs TnI 2hr 19 ng/L      Delta 2hr hsTnI 2 ng/L     FLU/RSV/COVID - if FLU/RSV clinically relevant [[id number]]  (Abnormal) Collected: 07/04/25 2031    Lab Status: Final result Specimen: Nares from Nose Updated: 07/04/25 2117     SARS-CoV-2 Negative     INFLUENZA A PCR Positive     INFLUENZA B PCR Negative     RSV PCR Negative    Narrative:      This test has been performed using the CoV-2/Flu/RSV plus assay on the [employer] platform. This test has been validated by the  and verified by the performing laboratory.     This test is designed to amplify and detect the following: nucleocapsid (N), envelope (E), and RNA-dependent RNA polymerase (RdRP) genes of the SARS-CoV-2 genome; matrix (M), basic polymerase (PB2), and acidic protein (PA) segments of the influenza A genome; matrix (M) and non-structural protein (NS) segments of the influenza B genome, and the nucleocapsid genes of RSV A and RSV B.     Positive results are indicative of the presence of Flu A, Flu B, RSV, and/or SARS-CoV-2 RNA. Positive results for SARS-CoV-2 or suspected novel influenza should be reported to state, local, or federal health departments according to local reporting requirements.      All results should be assessed in conjunction with clinical presentation and other laboratory markers for clinical management.     FOR PEDIATRIC PATIENTS - copy/paste COVID  Guidelines URL to browser: [email or web address]       B-Type Natriuretic Peptide(BNP) [[id number]]  (Abnormal) Collected: 07/04/25 2031    Lab Status: Final result Specimen: Blood from Arm, Right Updated: 07/04/25 2108      pg/mL     Procalcitonin [[id number]]  (Abnormal) Collected: 07/04/25 2031    Lab Status: Final result Specimen: Blood from Arm, Right Updated: 07/04/25 2104     Procalcitonin 0.28 ng/ml     HS Troponin 0hr (reflex protocol) [[id number]]  (Normal) Collected: 07/04/25 2031    Lab Status: Final result Specimen: Blood from Arm, Right Updated: 07/04/25 2102     hs TnI 0hr 17 ng/L     Lactic acid [[id number]]  (Normal) Collected: 07/04/25 2031    Lab Status: Final result Specimen: Blood from Arm, Right Updated: 07/04/25 2058     LACTIC ACID 1.7 mmol/L     Narrative:      Result may be elevated if tourniquet was used during collection.    Comprehensive metabolic panel [[id number]]  (Abnormal) Collected: 07/04/25 2031    Lab Status: Final result Specimen: Blood from Arm, Right Updated: 07/04/25 2058     Sodium 135 mmol/L      Potassium 3.3 mmol/L      Chloride 99 mmol/L      CO2 26 mmol/L      ANION GAP 10 mmol/L      BUN 12 mg/dL      Creatinine 0.92 mg/dL      Glucose 187 mg/dL      Calcium 8.8 mg/dL      AST 15 U/L      ALT 11 U/L      Alkaline Phosphatase 111 U/L      Total Protein 6.4 g/dL      Albumin 3.7 g/dL      Total Bilirubin 0.45 mg/dL      eGFR 79 ml/min/1.73sq m     Narrative:      National Kidney Disease Foundation guidelines for Chronic Kidney Disease (CKD):     Stage 1 with normal or high GFR (GFR > 90 mL/min/1.73 square meters)    Stage 2 Mild CKD (GFR = 60-89 mL/min/1.73 square meters)    Stage 3A Moderate CKD (GFR = 45-59 mL/min/1.73 square meters)    Stage 3B Moderate CKD (GFR = 30-44 mL/min/1.73 square meters)    Stage 4 Severe CKD (GFR = 15-29 mL/min/1.73 square meters)    Stage 5 End Stage CKD (GFR <15 mL/min/1.73 square  meters)  Note: GFR calculation is accurate only with a steady state creatinine    Protime-INR [[id number]]  (Abnormal) Collected: 07/04/25 2031    Lab Status: Final result Specimen: Blood from Arm, Right Updated: 07/04/25 2052     Protime 16.0 seconds      INR 1.23    Narrative:      INR Therapeutic Range    Indication                                             INR Range      Atrial Fibrillation                                               2.0-3.0  Hypercoagulable State                                    2.0.2.3  Left Ventricular Asist Device                            2.0-3.0  Mechanical Heart Valve                                  -    Aortic(with afib, MI, embolism, HF, LA enlargement,    and/or coagulopathy)                                     2.0-3.0 (2.5-3.5)     Mitral                                                             2.5-3.5  Prosthetic/Bioprosthetic Heart Valve               2.0-3.0  Venous thromboembolism (VTE: VT, PE        2.0-3.0    APTT [[id number]]  (Normal) Collected: 07/04/25 2031    Lab Status: Final result Specimen: Blood from Arm, Right Updated: 07/04/25 2052     PTT 33 seconds     CBC and differential [[id number]]  (Abnormal) Collected: 07/04/25 2031    Lab Status: Final result Specimen: Blood from Arm, Right Updated: 07/04/25 2040     WBC 8.41 Thousand/uL      RBC 4.95 Million/uL      Hemoglobin 12.8 g/dL      Hematocrit 41.3 %      MCV 83 fL      MCH 25.9 pg      MCHC 31.0 g/dL      RDW 15.8 %      MPV 9.9 fL      Platelets 177 Thousands/uL      nRBC 0 /100 WBCs      Segmented % 84 %      Immature Grans % 1 %      Lymphocytes % 7 %      Monocytes % 7 %      Eosinophils Relative 0 %      Basophils Relative 1 %      Absolute Neutrophils 7.14 Thousands/µL      Absolute Immature Grans 0.05 Thousand/uL      Absolute Lymphocytes 0.59 Thousands/µL      Absolute Monocytes 0.55 Thousand/µL      Eosinophils Absolute 0.02 Thousand/µL      Basophils Absolute 0.06 Thousands/µL     Blood culture #2  [[id number]] Collected: 07/04/25 2031    Lab Status: In process Specimen: Blood from Arm, Right Updated: 07/04/25 2036    Blood culture #1 [[id number]] Collected: 07/04/25 2032    Lab Status: In process Specimen: Blood from Arm, Left Updated: 07/04/25 2036            XR chest portable    (Results Pending)       Procedures    ED Medication and Procedure Management   Prior to Admission Medications   Prescriptions Last Dose Informant Patient Reported? Taking?   Coenzyme Q10 (CoQ-10) 100 MG capsule Past Month Self Yes Yes   IMMUNE GLOBULIN, HUMAN, IV More than a month Self Yes No   Sig: Inject 30,000 mg into a catheter in a vein Every 2 mos   Jardiance 25 MG TABS 7/4/2025 Morning  No Yes   Sig: TAKE 1 TABLET BY MOUTH EVERY MORNING   aspirin 81 MG tablet 7/4/2025 Morning Self Yes Yes   Sig: Take 81 mg by mouth in the morning.   atorvastatin (LIPITOR) 40 mg tablet 7/4/2025 Morning  No Yes   Sig: TAKE 1 TABLET BY MOUTH EVERY DAY   cholecalciferol (VITAMIN D3) 1,000 units tablet Past Month Self No Yes   Sig: Take 2 tablets (2,000 Units total) by mouth daily   ferrous sulfate 324 (65 Fe) mg Past Month Self No Yes   Sig: TAKE 1 TABLET BY MOUTH EVERY DAY WITH BREAKFAST   furosemide (LASIX) 20 mg tablet 7/4/2025 Morning Self No Yes   Sig: Take 1 tablet (20 mg total) by mouth daily   glucose blood test strip Past Month Self Yes Yes   metoprolol succinate (TOPROL-XL) 100 mg 24 hr tablet 7/4/2025 Morning Self No Yes   Sig: TAKE 1 TABLET BY MOUTH EVERY DAY   montelukast (SINGULAIR) 10 mg tablet 7/4/2025 Morning  No Yes   Sig: TAKE 1 TABLET BY MOUTH EVERY DAY   multivitamin-minerals (CENTRUM ADULTS) tablet Past Month Self No Yes   Sig: Take 1 tablet by mouth daily   omeprazole (PriLOSEC) 40 MG capsule 7/4/2025 Morning  No Yes   Sig: Take 1 capsule (40 mg total) by mouth daily   ondansetron (ZOFRAN-ODT) 4 mg disintegrating tablet More than a month  No No   Sig: Take 1 tablet (4 mg total) by mouth every 6 (six) hours as needed for  nausea or vomiting   valACYclovir (VALTREX) 500 mg tablet 7/4/2025 Morning Self Yes Yes   Sig: Take 500 mg by mouth in the morning.      Facility-Administered Medications: None     Current Discharge Medication List        CONTINUE these medications which have NOT CHANGED    Details   aspirin 81 MG tablet Take 81 mg by mouth in the morning.    Associated Diagnoses: Primary localized osteoarthritis of left knee      atorvastatin (LIPITOR) 40 mg tablet TAKE 1 TABLET BY MOUTH EVERY DAY  Qty: 90 tablet, Refills: 1    Associated Diagnoses: Coronary artery disease involving native coronary artery of native heart without angina pectoris      cholecalciferol (VITAMIN D3) 1,000 units tablet Take 2 tablets (2,000 Units total) by mouth daily  Qty: 60 tablet, Refills: 0    Associated Diagnoses: COVID-19      Coenzyme Q10 (CoQ-10) 100 MG capsule       ferrous sulfate 324 (65 Fe) mg TAKE 1 TABLET BY MOUTH EVERY DAY WITH BREAKFAST  Qty: 90 tablet, Refills: 0    Associated Diagnoses: Iron deficiency anemia, unspecified iron deficiency anemia type      furosemide (LASIX) 20 mg tablet Take 1 tablet (20 mg total) by mouth daily    Associated Diagnoses: Chronic diastolic congestive heart failure (HCC)      glucose blood test strip       Jardiance 25 MG TABS TAKE 1 TABLET BY MOUTH EVERY MORNING  Qty: 90 tablet, Refills: 1    Associated Diagnoses: Type 2 diabetes mellitus without complication, without long-term current use of insulin ([facility])      metoprolol succinate (TOPROL-XL) 100 mg 24 hr tablet TAKE 1 TABLET BY MOUTH EVERY DAY  Qty: 90 tablet, Refills: 1    Associated Diagnoses: Coronary artery disease involving native coronary artery of native heart without angina pectoris; Paroxysmal atrial fibrillation (HCC); Chronic diastolic congestive heart failure (HCC); Essential hypertension      montelukast (SINGULAIR) 10 mg tablet TAKE 1 TABLET BY MOUTH EVERY DAY  Qty: 90 tablet, Refills: 1    Associated Diagnoses: Multiple myeloma not having  achieved remission (HCC)      multivitamin-minerals (CENTRUM ADULTS) tablet Take 1 tablet by mouth daily  Qty:  , Refills: 0    Associated Diagnoses: COVID-19      omeprazole (PriLOSEC) 40 MG capsule Take 1 capsule (40 mg total) by mouth daily    Associated Diagnoses: Gastroesophageal reflux disease with esophagitis; Roberts's esophagus without dysplasia; Esophageal dysphagia; Schatzki's ring of distal esophagus      valACYclovir (VALTREX) 500 mg tablet Take 500 mg by mouth in the morning.      IMMUNE GLOBULIN, HUMAN, IV Inject 30,000 mg into a catheter in a vein Every 2 mos      ondansetron (ZOFRAN-ODT) 4 mg disintegrating tablet Take 1 tablet (4 mg total) by mouth every 6 (six) hours as needed for nausea or vomiting  Qty: 20 tablet, Refills: 0    Associated Diagnoses: Nausea and vomiting           No discharge procedures on file.  ED SEPSIS DOCUMENTATION   Time reflects when diagnosis was documented in both MDM as applicable and the Disposition within this note       Time User Action Codes Description Comment    7/4/2025 10:28 PM Patel Wade [J10.1] Influenza A     7/4/2025 10:28 PM Patel Wade [J18.9] Pneumonia     7/4/2025 10:28 PM Patel Wade [I50.9] CHF (congestive heart failure) (HCC)                    [1]   Past Medical History:  Diagnosis Date    Acute on chronic systolic (congestive) heart failure (HCC) 05/15/2024    Acute respiratory failure with hypoxia (HCC) 04/20/2022    Cataract     Colitis     Colon polyp     Fatty liver     History of chemotherapy     History of radiation therapy     History of shingles 2018    History of transfusion     Hyperlipidemia     Malignant tumor of cecum (HCC) 09/26/2013    Multiple myeloma (HCC)     Pneumonia 08/11/22    Sepsis without acute organ dysfunction (HCC) 02/04/2023    Shingles 2017   [2]   Past Surgical History:  Procedure Laterality Date    AORTIC VALVE REPLACEMENT      CARDIAC VALVE REPLACEMENT  2014    aorta    CATARACT EXTRACTION  Bilateral     COLECTOMY      COLONOSCOPY  2019    Prior right hemicolectomy    CORONARY ARTERY BYPASS GRAFT      DENTAL SURGERY      JOINT REPLACEMENT  2019    left knee    KNEE SURGERY      LYMPH NODE BIOPSY  2003    LYMPHADENECTOMY      OTHER SURGICAL HISTORY      MAZE PROCEDURE    MS ARTHRP KNE CONDYLE&PLATU MEDIAL&LAT COMPARTMENTS Left 2019    Procedure: ARTHROPLASTY LEFT KNEE TOTAL;  Surgeon: Darell Delgado MD;  Location:  MAIN OR;  Service: Orthopedics    MS LARYNGOSCOPY DIRECT OPERATIVE W/BIOPSY Right 2022    Procedure: DIRECT LARYNGOSCOPY WITH BIOPSY RIGHT PHARYNX, POSSIBLE RIGHT NECK LYMPH NODE BIOPSY; FROZEN SECTION;  Surgeon: Kevin Hardin MD;  Location:  MAIN OR;  Service: ENT    SKIN BIOPSY      UPPER GASTROINTESTINAL ENDOSCOPY  2019    Schatzki ring    US GUIDED LYMPH NODE BIOPSY RIGHT  2021   [3]   Family History  Problem Relation Name Age of Onset    Heart block Family      Coronary artery disease Mother mother     Thrombosis Mother mother     Hypertension Mother mother     Arthritis Mother mother     Hyperlipidemia Mother mother     Diabetes Father Macario Jr.     Hypertension Father Macario Jr.     Arthritis Father Macario Galicia.     Sudden death Father Macario Galicia.         cardiac    Diabetes type II Father Macario Galicia.     Colon cancer Paternal Grandfather Macario Pickard Sr     Cancer Paternal Grandfather Macario Pickard Sr     Breast cancer Sister      Heart disease Brother          Coronary stents   [4]   Social History  Tobacco Use    Smoking status: Former     Current packs/day: 0.00     Average packs/day: 1 pack/day for 42.0 years (42.0 ttl pk-yrs)     Types: Cigarettes     Start date:      Quit date: 2009     Years since quittin.5    Smokeless tobacco: Never   Vaping Use    Vaping status: Never Used   Substance Use Topics    Alcohol use: Yes     Alcohol/week: 2.0 standard drinks of alcohol     Types: 2 Cans of beer per week     Comment: very rare  "\"beer here and there\"    Drug use: No        Patel Wade, DO  07/05/25 0021    "

## 2025-07-05 NOTE — H&P
H&P - Hospitalist   Name: Gabe Pickard 79 y.o. male I MRN: [id number]  Unit/Bed#: -01 I Date of Admission: 7/4/2025   Date of Service: 7/4/2025 I Hospital Day: 0     Assessment & Plan  Influenza A  Presented due to dyspnea, cough, congestion, and fevers/chills x 1 week.   Flu A +. Covid/Flu B/RSV -   CXR pending official read. Possible infiltrate noted.   Due to Fevers/elevated procal (0.28) started on IV zosyn (allergies)  Not meeting SIRS.  Multifocal PNA admission in May.   Start Tamiflu (immunocompromised)  Manage symptoms   BC pending   Sputum culture pending   Acute on chronic diastolic congestive heart failure (HCC)  Wt Readings from Last 3 Encounters:   07/04/25 64 kg (141 lb 3.2 oz)   06/09/25 66.7 kg (147 lb)   05/29/25 64.9 kg (143 lb)        Appears mildly hypervolemic on CXR. Reporting dyspnea. Suspect mostly from Flu A. Received dose of IV lasix 40 mg in the ER  Prior ECHO May 2025: EF 55-60%. No wall abnormalities identified but cannot be completely excluded from this study.   Home regimen:   Lasix 20 mg daily   Plan   Patient reports 5 pound weight loss over the past 2 weeks.  On exam does not appear hypervolemic.   Transition back to home regimen of Lasix 20 mg daily  I/O and daily weights   Lymphoma (HCC)  Under surveillance.  On maintenance chemotherapy  Dyslipidemia  Continue statin  Hx of CABG  Continue aspirin, statin, beta-blocker  Essential hypertension  Home regimen:  Metoprolol succinate 100 mg daily  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      VTE Pharmacologic Prophylaxis: VTE Score: 3 Moderate Risk (Score 3-4) - Pharmacological DVT Prophylaxis Ordered: heparin.  Code Status: Level 1 - Full Code   Discussion with family: Patient declined call to .     Anticipated Length of Stay: Patient will be admitted on an inpatient basis with an anticipated length of stay of greater than 2 midnights secondary to influenza A.    History of  Present Illness   Chief Complaint: Cough    Gabe Pickard is a 79 y.o. male with a PMH of hypertension, hyperlipidemia, CAD, CABG, multiple myeloma, lymphoma, colon cancer who presents due to shortness of breath, cough, congestion and chills with suspected fevers.  Patient was just on vacation in [place] and [place] over the past 2 weeks.  The symptoms began about a week ago.  Did not have access to a thermometer so was unable to take his temperature.  Did not start experiencing the chills and fever sensation until 2 to 3 days ago.  Cough is productive.     Monitors his weight and reports that he has lost approximately 5 pounds while on vacation.  He attributes this to not liking the food in [place] or [place].  Had mild swelling in his ankles after flying but this has since resolved.     Review of Systems   Constitutional:  Positive for chills and fever. Negative for fatigue.   HENT:  Positive for congestion. Negative for sore throat.    Respiratory:  Positive for cough and shortness of breath. Negative for chest tightness.    Cardiovascular:  Negative for chest pain.   Gastrointestinal:  Negative for abdominal distention, abdominal pain, diarrhea, nausea and vomiting.   Genitourinary:  Negative for difficulty urinating.   Musculoskeletal:  Negative for arthralgias.   Neurological:  Negative for weakness and headaches.   Psychiatric/Behavioral:  Negative for agitation and behavioral problems.    All other systems reviewed and are negative.      Historical Information   Past Medical History[1]  Past Surgical History[2]  Social History[3]  E-Cigarette/Vaping    E-Cigarette Use Never User      E-Cigarette/Vaping Substances    Nicotine No     THC No     CBD No     Flavoring No     Other No     Unknown No      Family History[4]  Social History:  Marital Status: /Civil Union   Occupation: Unknown   Patient Pre-hospital Living Situation: Home  Patient Pre-hospital Level of Mobility: walks  Patient Pre-hospital  Diet Restrictions: Cardiac    Meds/Allergies   I have reviewed home medications with patient personally.  Prior to Admission medications    Medication Sig Start Date End Date Taking? Authorizing Provider   aspirin 81 MG tablet Take 81 mg by mouth in the morning. 3/29/19   Heladio Garcia MD   atorvastatin (LIPITOR) 40 mg tablet TAKE 1 TABLET BY MOUTH EVERY DAY 6/22/25   Heladio Garcia MD   cholecalciferol (VITAMIN D3) 1,000 units tablet Take 2 tablets (2,000 Units total) by mouth daily 4/11/21   Nikia Perez DO   Coenzyme Q10 (CoQ-10) 100 MG capsule     Historical Provider, MD   ferrous sulfate 324 (65 Fe) mg TAKE 1 TABLET BY MOUTH EVERY DAY WITH BREAKFAST 3/23/24   Radha Pettit DO   furosemide (LASIX) 20 mg tablet Take 1 tablet (20 mg total) by mouth daily 11/25/24   Radha Pettit DO   glucose blood test strip     Historical Provider, MD   IMMUNE GLOBULIN, HUMAN, IV Inject 30,000 mg into a catheter in a vein Every 2 mos    Historical Provider, MD   Jardiance 25 MG TABS TAKE 1 TABLET BY MOUTH EVERY MORNING 4/2/25   Lucita La MD   metoprolol succinate (TOPROL-XL) 100 mg 24 hr tablet TAKE 1 TABLET BY MOUTH EVERY DAY 2/15/25   Radha Pettit DO   montelukast (SINGULAIR) 10 mg tablet TAKE 1 TABLET BY MOUTH EVERY DAY 4/18/25   Radha Pettit DO   multivitamin-minerals (CENTRUM ADULTS) tablet Take 1 tablet by mouth daily 4/13/21   Nikia Perez DO   omeprazole (PriLOSEC) 40 MG capsule Take 1 capsule (40 mg total) by mouth daily 5/29/25   Radha Pettit DO   ondansetron (ZOFRAN-ODT) 4 mg disintegrating tablet Take 1 tablet (4 mg total) by mouth every 6 (six) hours as needed for nausea or vomiting 4/14/25   Gabino Dash MD   valACYclovir (VALTREX) 500 mg tablet Take 500 mg by mouth in the morning. 5/27/22   Historical Provider, MD     Allergies   Allergen Reactions    Ceftin [Cefuroxime] Itching     However, has tolerated Cefazolin and Pip-Tazo since, which have different side  chains. Be cautious with / avoid 2nd, 3rd, or 4th Gen Cephs that have similar side chains to Cefuroxime.    Lantus [Insulin Glargine] Itching       Objective :  Temp:  [98.5 °F (36.9 °C)-99.3 °F (37.4 °C)] 98.5 °F (36.9 °C)  HR:  [71-90] 71  BP: (115-153)/(48-67) 122/51  Resp:  [16-19] 16  SpO2:  [92 %-95 %] 92 %  O2 Device: None (Room air)    Physical Exam  Vitals and nursing note reviewed.   Constitutional:       Appearance: Normal appearance.   HENT:      Head: Normocephalic.     Eyes:      Extraocular Movements: Extraocular movements intact.      Pupils: Pupils are equal, round, and reactive to light.       Cardiovascular:      Rate and Rhythm: Normal rate and regular rhythm.      Heart sounds: No murmur heard.     No gallop.   Pulmonary:      Effort: No respiratory distress.      Breath sounds: Normal breath sounds. No wheezing.   Abdominal:      General: Bowel sounds are normal. There is no distension.      Tenderness: There is no abdominal tenderness.     Musculoskeletal:         General: Normal range of motion.      Cervical back: Normal range of motion.      Right lower leg: No edema.      Left lower leg: No edema.     Skin:     General: Skin is warm.     Neurological:      General: No focal deficit present.      Mental Status: He is alert and oriented to person, place, and time. Mental status is at baseline.     Psychiatric:         Mood and Affect: Mood normal.         Behavior: Behavior normal.         Thought Content: Thought content normal.          Lines/Drains:            Lab Results: I have reviewed the following results:  Results from last 7 days   Lab Units 07/04/25 2031   WBC Thousand/uL 8.41   HEMOGLOBIN g/dL 12.8   HEMATOCRIT % 41.3   PLATELETS Thousands/uL 177   SEGS PCT % 84*   LYMPHO PCT % 7*   MONO PCT % 7   EOS PCT % 0     Results from last 7 days   Lab Units 07/04/25 2031   SODIUM mmol/L 135   POTASSIUM mmol/L 3.3*   CHLORIDE mmol/L 99   CO2 mmol/L 26   BUN mg/dL 12   CREATININE mg/dL  0.92   ANION GAP mmol/L 10   CALCIUM mg/dL 8.8   ALBUMIN g/dL 3.7   TOTAL BILIRUBIN mg/dL 0.45   ALK PHOS U/L 111*   ALT U/L 11   AST U/L 15   GLUCOSE RANDOM mg/dL 187*     Results from last 7 days   Lab Units 07/04/25 2031   INR  1.23*         Lab Results   Component Value Date    HGBA1C 5.8 (H) 06/18/2025    HGBA1C 6.2 (H) 03/06/2025    HGBA1C 5.8 (H) 08/26/2024     Results from last 7 days   Lab Units 07/04/25 2031   LACTIC ACID mmol/L 1.7   PROCALCITONIN ng/ml 0.28*       Imaging Results Review: No pertinent imaging studies reviewed.  Other Study Results Review: EKG was reviewed.     Administrative Statements   I have spent a total time of 50 minutes in caring for this patient on the day of the visit/encounter including Risks and benefits of tx options, Instructions for management, Counseling / Coordination of care, and Documenting in the medical record.    ** Please Note: This note has been constructed using a voice recognition system. **         [1]   Past Medical History:  Diagnosis Date    Acute on chronic systolic (congestive) heart failure (HCC) 05/15/2024    Acute respiratory failure with hypoxia (HCC) 04/20/2022    Cataract     Colitis     Colon polyp     Fatty liver     History of chemotherapy     History of radiation therapy     History of shingles 2018    History of transfusion     Hyperlipidemia     Malignant tumor of cecum (HCC) 09/26/2013    Multiple myeloma (HCC)     Pneumonia 08/11/22    Sepsis without acute organ dysfunction (HCC) 02/04/2023    Shingles 2017   [2]   Past Surgical History:  Procedure Laterality Date    AORTIC VALVE REPLACEMENT      CARDIAC VALVE REPLACEMENT  2014    aorta    CATARACT EXTRACTION Bilateral     COLECTOMY      COLONOSCOPY  11/2019    Prior right hemicolectomy    CORONARY ARTERY BYPASS GRAFT      DENTAL SURGERY      JOINT REPLACEMENT  January 2019    left knee    KNEE SURGERY      LYMPH NODE BIOPSY  2003    LYMPHADENECTOMY      OTHER SURGICAL HISTORY      MAZE  "PROCEDURE    OK ARTHRP KNE CONDYLE&PLATU MEDIAL&LAT COMPARTMENTS Left 2019    Procedure: ARTHROPLASTY LEFT KNEE TOTAL;  Surgeon: Darell Delgado MD;  Location:  MAIN OR;  Service: Orthopedics    OK LARYNGOSCOPY DIRECT OPERATIVE W/BIOPSY Right 2022    Procedure: DIRECT LARYNGOSCOPY WITH BIOPSY RIGHT PHARYNX, POSSIBLE RIGHT NECK LYMPH NODE BIOPSY; FROZEN SECTION;  Surgeon: Kevin Hardin MD;  Location:  MAIN OR;  Service: ENT    SKIN BIOPSY      UPPER GASTROINTESTINAL ENDOSCOPY  2019    Schatzki ring    US GUIDED LYMPH NODE BIOPSY RIGHT  2021   [3]   Social History  Tobacco Use    Smoking status: Former     Current packs/day: 0.00     Average packs/day: 1 pack/day for 42.0 years (42.0 ttl pk-yrs)     Types: Cigarettes     Start date:      Quit date: 2009     Years since quittin.5    Smokeless tobacco: Never   Vaping Use    Vaping status: Never Used   Substance and Sexual Activity    Alcohol use: Yes     Alcohol/week: 2.0 standard drinks of alcohol     Types: 2 Cans of beer per week     Comment: very rare \"beer here and there\"    Drug use: No    Sexual activity: Not Currently     Partners: Female     Birth control/protection: None   [4]   Family History  Problem Relation Name Age of Onset    Heart block Family      Coronary artery disease Mother mother     Thrombosis Mother mother     Hypertension Mother mother     Arthritis Mother mother     Hyperlipidemia Mother mother     Diabetes Father Macario Jr.     Hypertension Father Macario Jr.     Arthritis Father Macario Jr.     Sudden death Father Macario Jr.         cardiac    Diabetes type II Father [place] Jr.     Colon cancer Paternal Grandfather Macario Pickard Sr     Cancer Paternal Grandfather Macario Pickard Sr     Breast cancer Sister      Heart disease Brother          Coronary stents     "

## 2025-07-05 NOTE — ASSESSMENT & PLAN NOTE
Presented due to dyspnea, cough, congestion, and fevers/chills x 1 week.   Flu A +. Covid/Flu B/RSV -   CXR pending official read. Possible infiltrate noted.   Due to Fevers/elevated procal (0.28) started on IV zosyn (allergies)  Not meeting SIRS.  Multifocal PNA admission in May.   Start Tamiflu (immunocompromised)  Manage symptoms   Urinary antigens are negative  BC pending   Sputum culture pending

## 2025-07-05 NOTE — PLAN OF CARE
Problem: Potential for Falls  Goal: Patient will remain free of falls  Description: INTERVENTIONS:  - Educate patient/family on patient safety including physical limitations  - Instruct patient to call for assistance with activity   - Consider consulting OT/PT to assist with strengthening/mobility based on AM PAC & JH-HLM score  - Consult OT/PT to assist with strengthening/mobility   - Keep Call bell within reach  - Keep bed low and locked with side rails adjusted as appropriate  - Keep care items and personal belongings within reach  - Initiate and maintain comfort rounds  - Make Fall Risk Sign visible to staff  - Offer Toileting every 2 Hours, in advance of need  - Initiate/Maintain bed/chair alarm  - Obtain necessary fall risk management equipment: walker for ambulation if needed  - Apply yellow socks and bracelet for high fall risk patients  - Consider moving patient to room near nurses station  7/5/2025 0627 by Monster Sharp RN  Outcome: Progressing  7/5/2025 0627 by Monster Sharp RN  Outcome: Progressing     Problem: PAIN - ADULT  Goal: Verbalizes/displays adequate comfort level or baseline comfort level  Description: Interventions:  - Encourage patient to monitor pain and request assistance  - Assess pain using appropriate pain scale  - Administer analgesics as ordered based on type and severity of pain and evaluate response  - Implement non-pharmacological measures as appropriate and evaluate response  - Consider cultural and social influences on pain and pain management  - Notify physician/advanced practitioner if interventions unsuccessful or patient reports new pain  - Educate patient/family on pain management process including their role and importance of  reporting pain   - Provide non-pharmacologic/complimentary pain relief interventions  Outcome: Progressing     Problem: INFECTION - ADULT  Goal: Absence or prevention of progression during hospitalization  Description: INTERVENTIONS:  -  Assess and monitor for signs and symptoms of infection  - Monitor lab/diagnostic results  - Monitor all insertion sites, i.e. indwelling lines, tubes, and drains  - Monitor endotracheal if appropriate and nasal secretions for changes in amount and color  - Bricelyn appropriate cooling/warming therapies per order  - Administer medications as ordered  - Instruct and encourage patient and family to use good hand hygiene technique  - Identify and instruct in appropriate isolation precautions for identified infection/condition  Outcome: Progressing  Goal: Absence of fever/infection during neutropenic period  Description: INTERVENTIONS:  - Monitor WBC  - Perform strict hand hygiene  - Limit to healthy visitors only  - No plants, dried, fresh or silk flowers with irwin in patient room  Outcome: Progressing     Problem: SAFETY ADULT  Goal: Patient will remain free of falls  Description: INTERVENTIONS:  - Educate patient/family on patient safety including physical limitations  - Instruct patient to call for assistance with activity   - Consider consulting OT/PT to assist with strengthening/mobility based on AM PAC & JH-HLM score  - Consult OT/PT to assist with strengthening/mobility   - Keep Call bell within reach  - Keep bed low and locked with side rails adjusted as appropriate  - Keep care items and personal belongings within reach  - Initiate and maintain comfort rounds  - Make Fall Risk Sign visible to staff  - Offer Toileting every 2 Hours, in advance of need  - Initiate/Maintain bed/chair alarm  - Obtain necessary fall risk management equipment: walker for ambulation if needed  - Apply yellow socks and bracelet for high fall risk patients  - Consider moving patient to room near nurses station  7/5/2025 0627 by Monster Sharp, RN  Outcome: Progressing  7/5/2025 0627 by Monster Sharp, RN  Outcome: Progressing  Goal: Maintain or return to baseline ADL function  Description: INTERVENTIONS:  -  Assess patient's  ability to carry out ADLs; assess patient's baseline for ADL function and identify physical deficits which impact ability to perform ADLs (bathing, care of mouth/teeth, toileting, grooming, dressing, etc.)  - Assess/evaluate cause of self-care deficits   - Assess range of motion  - Assess patient's mobility; develop plan if impaired  - Assess patient's need for assistive devices and provide as appropriate  - Encourage maximum independence but intervene and supervise when necessary  - Involve family in performance of ADLs  - Assess for home care needs following discharge   - Consider OT consult to assist with ADL evaluation and planning for discharge  - Provide patient education as appropriate  - Monitor functional capacity and physical performance, use of AM PAC & JH-HLM   - Monitor gait, balance and fatigue with ambulation    Outcome: Progressing  Goal: Maintains/Returns to pre admission functional level  Description: INTERVENTIONS:  - Perform AM-PAC 6 Click Basic Mobility/ Daily Activity assessment daily.  - Set and communicate daily mobility goal to care team and patient/family/caregiver.   - Collaborate with rehabilitation services on mobility goals if consulted  - Perform Range of Motion 3 times a day.  - Reposition patient every 2 hours.  - Dangle patient 2 times a day  - Stand patient 2 times a day  - Ambulate patient 2 times a day  - Out of bed to chair 2 times a day   - Out of bed for meals 2 times a day  - Out of bed for toileting  - Record patient progress and toleration of activity level   Outcome: Progressing     Problem: DISCHARGE PLANNING  Goal: Discharge to home or other facility with appropriate resources  Description: INTERVENTIONS:  - Identify barriers to discharge w/patient and caregiver  - Arrange for needed discharge resources and transportation as appropriate  - Identify discharge learning needs (meds, wound care, etc.)  - Arrange for interpretive services to assist at discharge as needed  -  Refer to Case Management Department for coordinating discharge planning if the patient needs post-hospital services based on physician/advanced practitioner order or complex needs related to functional status, cognitive ability, or social support system  Outcome: Progressing     Problem: Knowledge Deficit  Goal: Patient/family/caregiver demonstrates understanding of disease process, treatment plan, medications, and discharge instructions  Description: Complete learning assessment and assess knowledge base.  Interventions:  - Provide teaching at level of understanding  - Provide teaching via preferred learning methods  Outcome: Progressing

## 2025-07-05 NOTE — ASSESSMENT & PLAN NOTE
Presented due to dyspnea, cough, congestion, and fevers/chills x 1 week.   Flu A +. Covid/Flu B/RSV -   CXR pending official read. Possible infiltrate noted.   Due to Fevers/elevated procal (0.28) started on IV zosyn (allergies)  Not meeting SIRS.  Multifocal PNA admission in May.   Start Tamiflu (immunocompromised)  Manage symptoms   BC pending   Sputum culture pending

## 2025-07-05 NOTE — PROGRESS NOTES
Progress Note - Hospitalist   Name: Gabe Pickard 79 y.o. male I MRN: [id number]  Unit/Bed#: -01 I Date of Admission: 7/4/2025   Date of Service: 7/5/2025 I Hospital Day: 1    Assessment & Plan  Influenza A  Presented due to dyspnea, cough, congestion, and fevers/chills x 1 week.   Flu A +. Covid/Flu B/RSV -   CXR pending official read. Possible infiltrate noted.   Due to Fevers/elevated procal (0.28) started on IV zosyn (allergies)  Not meeting SIRS.  Multifocal PNA admission in May.   Start Tamiflu (immunocompromised)  Manage symptoms   Urinary antigens are negative  BC pending   Sputum culture pending   Acute on chronic diastolic congestive heart failure (HCC)  Wt Readings from Last 3 Encounters:   07/04/25 64 kg (141 lb 3.2 oz)   06/09/25 66.7 kg (147 lb)   05/29/25 64.9 kg (143 lb)        Appears mildly hypervolemic on CXR. Reporting dyspnea. Suspect mostly from Flu A. Received dose of IV lasix 40 mg in the ER  Prior ECHO May 2025: EF 55-60%. No wall abnormalities identified but cannot be completely excluded from this study.   Home regimen:   Lasix 20 mg daily   Plan   Patient reports 5 pound weight loss over the past 2 weeks.  On exam does not appear hypervolemic.   Transition back to home regimen of Lasix 20 mg daily  I/O and daily weights   Lymphoma (HCC)  Under surveillance.  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    VTE Prophylaxis: in place.    Code Status:   Level 1 - Full Code    Patient Centered Rounds: I have performed bedside rounds with nursing staff today.    Mobility:   Basic Mobility Inpatient Raw Score: 24  JH-HLM Goal: 8: Walk 250 feet or more  JH-HLM Achieved: 7: Walk 25 feet or  "more  JH-HLM Goal achieved. Continue to encourage appropriate mobility.    Discussions with Specialists or Other Care Team Provider: RN    Total Time Spent on Date of Encounter in care of patient: 35 mins. This time was spent on one or more of the following: performing physical exam; counseling and coordination of care; obtaining or reviewing history; documenting in the medical record; reviewing/ordering tests, medications or procedures; communicating with other healthcare professionals and discussing with patient's family/caregivers.    Current Length of Stay: 1 day(s)    Current Patient Status: Inpatient   Certification Statement: The patient will continue to require additional inpatient hospital stay due to see my assessment and plan.     Subjective:   Patient is seen and examined at bedside.  No new complaints.  Afebrile  All other ROS are negative.    Objective:    Vitals: Blood pressure 134/58, pulse 77, temperature 99.5 °F (37.5 °C), resp. rate 18, height 5' 10\" (1.778 m), weight 64 kg (141 lb 3.2 oz), SpO2 90%.,Body mass index is 20.26 kg/m².  SPO2 RA Rest      Flowsheet Row ED to Hosp-Admission (Current) from 7/4/2025 in [facility]   SpO2 90 %   SpO2 Activity At Rest   O2 Device None (Room air)   O2 Flow Rate --          I&O:   Intake/Output Summary (Last 24 hours) at 7/5/2025 0705  Last data filed at 7/5/2025 0601  Gross per 24 hour   Intake 700 ml   Output 1600 ml   Net -900 ml       Physical Exam:    General- Alert, lying comfortably in bed. Not in any acute distress.  Neck- Supple, No JVD  CVS- regular, S1 and S2 normal  Chest- Bilateral Air entry, No rhochi, crackles or wheezing present.  Abdomen- soft, nontender, not distended, no guarding or rigidity, BS+  Extremities-  No pedal edema, No calf tenderness  CNS-   Alert, awake and orientedx3. No focal deficits present.    Invasive Devices       Peripheral Intravenous Line  Duration             Peripheral IV 07/04/25 " Left;Ventral (anterior) Forearm <1 day    Peripheral IV 07/04/25 Proximal;Right;Ventral (anterior) Forearm <1 day                          Social History  reviewed  Family History[1] reviewed    Meds:  Current Medications[2]     Medications Prior to Admission:     aspirin 81 MG tablet    atorvastatin (LIPITOR) 40 mg tablet    cholecalciferol (VITAMIN D3) 1,000 units tablet    Coenzyme Q10 (CoQ-10) 100 MG capsule    ferrous sulfate 324 (65 Fe) mg    furosemide (LASIX) 20 mg tablet    glucose blood test strip    Jardiance 25 MG TABS    metoprolol succinate (TOPROL-XL) 100 mg 24 hr tablet    montelukast (SINGULAIR) 10 mg tablet    multivitamin-minerals (CENTRUM ADULTS) tablet    omeprazole (PriLOSEC) 40 MG capsule    valACYclovir (VALTREX) 500 mg tablet    IMMUNE GLOBULIN, HUMAN, IV    ondansetron (ZOFRAN-ODT) 4 mg disintegrating tablet    Labs:  Results from last 7 days   Lab Units 07/05/25 0544 07/04/25 2031   WBC Thousand/uL 6.80 8.41   HEMOGLOBIN g/dL 13.1 12.8   HEMATOCRIT % 41.8 41.3   PLATELETS Thousands/uL 179 177   SEGS PCT %  --  84*   LYMPHO PCT %  --  7*   MONO PCT %  --  7   EOS PCT %  --  0     Results from last 7 days   Lab Units 07/05/25 0544 07/04/25 2031   POTASSIUM mmol/L 3.2* 3.3*   CHLORIDE mmol/L 99 99   CO2 mmol/L 32 26   BUN mg/dL 10 12   CREATININE mg/dL 0.94 0.92   CALCIUM mg/dL 8.6 8.8   ALK PHOS U/L  --  111*   ALT U/L  --  11   AST U/L  --  15     Lab Results   Component Value Date    TROPONINI 0.05 (H) 04/15/2021    TROPONINI 0.04 04/06/2021    TROPONINI 0.05 (H) 04/06/2021    CKMB 1.7 04/07/2021    CKMB 1.9 04/06/2021    CKTOTAL 29 (L) 12/07/2022    CKTOTAL 24 (L) 04/15/2021    CKTOTAL 193 04/07/2021     Results from last 7 days   Lab Units 07/04/25 2031   INR  1.23*     Lab Results   Component Value Date    BLOODCX No Growth After 5 Days. 05/26/2025    BLOODCX No Growth After 5 Days. 05/26/2025    BLOODCX No Growth After 5 Days. 05/07/2025    URINECX No Growth <1000 cfu/mL  02/04/2023    SPUTUMCULTUR 2+ Growth of 04/19/2022         Imaging:  Results for orders placed during the hospital encounter of 03/13/25    XR chest 1 view portable    Narrative  XR CHEST PORTABLE    INDICATION: SOB.    COMPARISON: 6/12/2024    FINDINGS:  Small left pleural effusion and adjacent parenchymal consolidation. Clear remaining lungs.    Mild cardiomegaly. Status post valvuloplasty and CABG.    Degenerative changes in the shoulders. Sternotomy sutures.    Normal upper abdomen.    Impression  Small left pleural effusion and adjacent parenchymal consolidation.        Workstation performed: [id number]    Results for orders placed during the hospital encounter of 05/26/25    XR chest 2 views    Narrative  XR CHEST PA AND LATERAL    INDICATION: sob.    COMPARISON: CXR 3/13/2025, chest CT 4/25/2025, subsequent chest CT 5/26/2025.    FINDINGS:    Mild pulmonary venous congestion. Chronic scar in the left base. Small pleural effusions.    Normal heart size. AVR. Retained epicardial pacer wires.    Bones are unremarkable for age.    Normal upper abdomen.    Impression  Mild pulmonary venous congestion with small pleural effusions. See subsequent chest CT.        Workstation performed: [id number]      Last 24 Hours Medication List:   Current Facility-Administered Medications   Medication Dose Route Frequency Provider Last Rate    acetaminophen  650 mg Oral Q6H PRN Karen Monte PA-C      albuterol  2 puff Inhalation Q4H PRN Jo Lu DO      aspirin  81 mg Oral Daily Karen Monte PA-C      atorvastatin  40 mg Oral Daily Karen Monte PA-C      benzonatate  100 mg Oral TID PRN Karen Monte PA-C      cholecalciferol  2,000 Units Oral Daily Karen Monte PA-C      Empagliflozin  20 mg Oral QAM Karen Monte PA-C      ferrous sulfate  325 mg Oral Daily With Breakfast Karen Monte PA-C      furosemide  20 mg Oral Daily Karen Monte PA-C      guaiFENesin  600 mg Oral Q12H [facility] Karen Monte PA-C      heparin (porcine)  5,000 Units  Subcutaneous Q8H [facility] Karen Monte PA-C      metoprolol succinate  100 mg Oral Daily Karen Monte PA-C      montelukast  10 mg Oral Daily Karen Monte PA-C      multivitamin-minerals  1 tablet Oral Daily Karen Monte PA-C      oseltamivir  75 mg Oral Q12H [facility] Karen Monte PA-C      pantoprazole  40 mg Oral Early Morning Karen Monte PA-C      piperacillin-tazobactam  4.5 g Intravenous Q8H Karen Monte PA-C 4.5 g (07/05/25 0154)    trimethobenzamide  200 mg Intramuscular Q6H PRN Karen Monte PA-C      valACYclovir  500 mg Oral Daily Karen Monte PA-C          Today, Patient Was Seen By: Rishabh Marquis MD    ** Please Note: Dictation voice to text software may have been used in the creation of this document. **             [1]   Family History  Problem Relation Name Age of Onset    Heart block Family      Coronary artery disease Mother mother     Thrombosis Mother mother     Hypertension Mother mother     Arthritis Mother mother     Hyperlipidemia Mother mother     Diabetes Father Macario Jr.     Hypertension Father Macario Galicia.     Arthritis Father Macario Jr.     Sudden death Father Macario Galicia.         cardiac    Diabetes type II Father Macario Galicia.     Colon cancer Paternal Grandfather Macario Pickard Sr     Cancer Paternal Grandfather Macario Pickard Sr     Breast cancer Sister      Heart disease Brother          Coronary stents   [2]   Current Facility-Administered Medications   Medication Dose Route Frequency Provider Last Rate Last Admin    acetaminophen (TYLENOL) tablet 650 mg  650 mg Oral Q6H PRN Karen Monte PA-C   650 mg at 07/05/25 0638    albuterol (PROVENTIL HFA,VENTOLIN HFA) inhaler 2 puff  2 puff Inhalation Q4H PRN Jo Lu DO        aspirin (ECOTRIN LOW STRENGTH) EC tablet 81 mg  81 mg Oral Daily Karen Monte PA-C        atorvastatin (LIPITOR) tablet 40 mg  40 mg Oral Daily Karen Monte PA-C        benzonatate (TESSALON PERLES) capsule 100 mg  100 mg Oral TID PRN Karen Monte PA-C        Cholecalciferol (VITAMIN D3)  tablet 2,000 Units  2,000 Units Oral Daily Karen Monte PA-C        Empagliflozin (JARDIANCE) tablet 20 mg  20 mg Oral QAM Karen Monte PA-C        ferrous sulfate tablet 325 mg  325 mg Oral Daily With Breakfast Karen Monte PA-C        furosemide (LASIX) tablet 20 mg  20 mg Oral Daily Karen Monte PA-C        guaiFENesin (MUCINEX) 12 hr tablet 600 mg  600 mg Oral Q12H [facility] Karen Monte PA-C   600 mg at 07/05/25 0004    heparin (porcine) subcutaneous injection 5,000 Units  5,000 Units Subcutaneous Q8H [facility] Karen Monte PA-C   5,000 Units at 07/05/25 0538    metoprolol succinate (TOPROL-XL) 24 hr tablet 100 mg  100 mg Oral Daily Karen Monte PA-C        montelukast (SINGULAIR) tablet 10 mg  10 mg Oral Daily Karen Monte PA-C        multivitamin-minerals (CENTRUM) tablet 1 tablet  1 tablet Oral Daily Karen Monte PA-C        oseltamivir (TAMIFLU) capsule 75 mg  75 mg Oral Q12H [facility] Karen Monte PA-C   75 mg at 07/05/25 0004    pantoprazole (PROTONIX) EC tablet 40 mg  40 mg Oral Early Morning Karen Monte PA-C   40 mg at 07/05/25 0539    piperacillin-tazobactam (ZOSYN) IVPB (EXTENDED INFUSION) 4.5 g  4.5 g Intravenous Q8H Karen Monte PA-C 25 mL/hr at 07/05/25 0154 4.5 g at 07/05/25 0154    trimethobenzamide (TIGAN) IM injection 200 mg  200 mg Intramuscular Q6H PRN Karen Monte PA-C        valACYclovir (VALTREX) tablet 500 mg  500 mg Oral Daily Karen Monte PA-C

## 2025-07-05 NOTE — ASSESSMENT & PLAN NOTE
Wt Readings from Last 3 Encounters:   07/04/25 64 kg (141 lb 3.2 oz)   06/09/25 66.7 kg (147 lb)   05/29/25 64.9 kg (143 lb)        Appears mildly hypervolemic on CXR. Reporting dyspnea. Suspect mostly from Flu A. Received dose of IV lasix 40 mg in the ER  Prior ECHO May 2025: EF 55-60%. No wall abnormalities identified but cannot be completely excluded from this study.   Home regimen:   Lasix 20 mg daily   Plan   Patient reports 5 pound weight loss over the past 2 weeks.  On exam does not appear hypervolemic.   Transition back to home regimen of Lasix 20 mg daily  I/O and daily weights

## 2025-07-05 NOTE — PLAN OF CARE
Problem: Potential for Falls  Goal: Patient will remain free of falls  Description: INTERVENTIONS:  - Educate patient/family on patient safety including physical limitations  - Instruct patient to call for assistance with activity   - Consider consulting OT/PT to assist with strengthening/mobility based on AM PAC & JH-HLM score  - Consult OT/PT to assist with strengthening/mobility   - Keep Call bell within reach  - Keep bed low and locked with side rails adjusted as appropriate  - Keep care items and personal belongings within reach  - Initiate and maintain comfort rounds  - Make Fall Risk Sign visible to staff  - Offer Toileting every 2 Hours, in advance of need  - Initiate/Maintain bed/chair alarm  - Obtain necessary fall risk management equipment  - Apply yellow socks and bracelet for high fall risk patients  - Consider moving patient to room near nurses station  Outcome: Progressing     Problem: PAIN - ADULT  Goal: Verbalizes/displays adequate comfort level or baseline comfort level  Description: Interventions:  - Encourage patient to monitor pain and request assistance  - Assess pain using appropriate pain scale  - Administer analgesics as ordered based on type and severity of pain and evaluate response  - Implement non-pharmacological measures as appropriate and evaluate response  - Consider cultural and social influences on pain and pain management  - Notify physician/advanced practitioner if interventions unsuccessful or patient reports new pain  - Educate patient/family on pain management process including their role and importance of  reporting pain   - Provide non-pharmacologic/complimentary pain relief interventions  Outcome: Progressing     Problem: INFECTION - ADULT  Goal: Absence or prevention of progression during hospitalization  Description: INTERVENTIONS:  - Assess and monitor for signs and symptoms of infection  - Monitor lab/diagnostic results  - Monitor all insertion sites, i.e. indwelling  lines, tubes, and drains  - Monitor endotracheal if appropriate and nasal secretions for changes in amount and color  - [place] appropriate cooling/warming therapies per order  - Administer medications as ordered  - Instruct and encourage patient and family to use good hand hygiene technique  - Identify and instruct in appropriate isolation precautions for identified infection/condition  Outcome: Progressing  Goal: Absence of fever/infection during neutropenic period  Description: INTERVENTIONS:  - Monitor WBC  - Perform strict hand hygiene  - Limit to healthy visitors only  - No plants, dried, fresh or silk flowers with irwin in patient room  Outcome: Progressing     Problem: SAFETY ADULT  Goal: Patient will remain free of falls  Description: INTERVENTIONS:  - Educate patient/family on patient safety including physical limitations  - Instruct patient to call for assistance with activity   - Consider consulting OT/PT to assist with strengthening/mobility based on AM PAC & JH-HLM score  - Consult OT/PT to assist with strengthening/mobility   - Keep Call bell within reach  - Keep bed low and locked with side rails adjusted as appropriate  - Keep care items and personal belongings within reach  - Initiate and maintain comfort rounds  - Make Fall Risk Sign visible to staff  - Offer Toileting every 2 Hours, in advance of need  - Initiate/Maintain bed/chair alarm  - Obtain necessary fall risk management equipment  - Apply yellow socks and bracelet for high fall risk patients  - Consider moving patient to room near nurses station  Outcome: Progressing  Goal: Maintain or return to baseline ADL function  Description: INTERVENTIONS:  -  Assess patient's ability to carry out ADLs; assess patient's baseline for ADL function and identify physical deficits which impact ability to perform ADLs (bathing, care of mouth/teeth, toileting, grooming, dressing, etc.)  - Assess/evaluate cause of self-care deficits   - Assess range of  motion  - Assess patient's mobility; develop plan if impaired  - Assess patient's need for assistive devices and provide as appropriate  - Encourage maximum independence but intervene and supervise when necessary  - Involve family in performance of ADLs  - Assess for home care needs following discharge   - Consider OT consult to assist with ADL evaluation and planning for discharge  - Provide patient education as appropriate  - Monitor functional capacity and physical performance, use of AM PAC & JH-HLM   - Monitor gait, balance and fatigue with ambulation    Outcome: Progressing  Goal: Maintains/Returns to pre admission functional level  Description: INTERVENTIONS:  - Perform AM-PAC 6 Click Basic Mobility/ Daily Activity assessment daily.  - Set and communicate daily mobility goal to care team and patient/family/caregiver.   - Collaborate with rehabilitation services on mobility goals if consulted  - Perform Range of Motion 3 times a day.  - Reposition patient every 2 hours.  - Dangle patient 3 times a day  - Stand patient 3 times a day  - Ambulate patient 3 times a day  - Out of bed to chair 3 times a day   - Out of bed for meals 3 times a day  - Out of bed for toileting  - Record patient progress and toleration of activity level   Outcome: Progressing     Problem: DISCHARGE PLANNING  Goal: Discharge to home or other facility with appropriate resources  Description: INTERVENTIONS:  - Identify barriers to discharge w/patient and caregiver  - Arrange for needed discharge resources and transportation as appropriate  - Identify discharge learning needs (meds, wound care, etc.)  - Arrange for interpretive services to assist at discharge as needed  - Refer to Case Management Department for coordinating discharge planning if the patient needs post-hospital services based on physician/advanced practitioner order or complex needs related to functional status, cognitive ability, or social support system  Outcome:  Progressing     Problem: Knowledge Deficit  Goal: Patient/family/caregiver demonstrates understanding of disease process, treatment plan, medications, and discharge instructions  Description: Complete learning assessment and assess knowledge base.  Interventions:  - Provide teaching at level of understanding  - Provide teaching via preferred learning methods  Outcome: Progressing

## 2025-07-06 VITALS
DIASTOLIC BLOOD PRESSURE: 59 MMHG | TEMPERATURE: 98.3 F | HEIGHT: 70 IN | BODY MASS INDEX: 20.22 KG/M2 | HEART RATE: 65 BPM | WEIGHT: 141.2 LBS | RESPIRATION RATE: 16 BRPM | SYSTOLIC BLOOD PRESSURE: 138 MMHG | OXYGEN SATURATION: 94 %

## 2025-07-06 LAB
ANION GAP SERPL CALCULATED.3IONS-SCNC: 6 MMOL/L (ref 4–13)
BASOPHILS # BLD AUTO: 0.04 THOUSANDS/ÂΜL (ref 0–0.1)
BASOPHILS NFR BLD AUTO: 1 % (ref 0–1)
BUN SERPL-MCNC: 12 MG/DL (ref 5–25)
CALCIUM SERPL-MCNC: 8 MG/DL (ref 8.4–10.2)
CHLORIDE SERPL-SCNC: 100 MMOL/L (ref 96–108)
CO2 SERPL-SCNC: 30 MMOL/L (ref 21–32)
CREAT SERPL-MCNC: 0.97 MG/DL (ref 0.6–1.3)
EOSINOPHIL # BLD AUTO: 0.03 THOUSAND/ÂΜL (ref 0–0.61)
EOSINOPHIL NFR BLD AUTO: 1 % (ref 0–6)
ERYTHROCYTE [DISTWIDTH] IN BLOOD BY AUTOMATED COUNT: 15.7 % (ref 11.6–15.1)
GFR SERPL CREATININE-BSD FRML MDRD: 74 ML/MIN/1.73SQ M
GLUCOSE SERPL-MCNC: 107 MG/DL (ref 65–140)
GLUCOSE SERPL-MCNC: 116 MG/DL (ref 65–140)
GLUCOSE SERPL-MCNC: 89 MG/DL (ref 65–140)
HCT VFR BLD AUTO: 37.7 % (ref 36.5–49.3)
HGB BLD-MCNC: 11.5 G/DL (ref 12–17)
IMM GRANULOCYTES # BLD AUTO: 0.04 THOUSAND/UL (ref 0–0.2)
IMM GRANULOCYTES NFR BLD AUTO: 1 % (ref 0–2)
LYMPHOCYTES # BLD AUTO: 0.84 THOUSANDS/ÂΜL (ref 0.6–4.47)
LYMPHOCYTES NFR BLD AUTO: 15 % (ref 14–44)
MAGNESIUM SERPL-MCNC: 2 MG/DL (ref 1.9–2.7)
MCH RBC QN AUTO: 25.1 PG (ref 26.8–34.3)
MCHC RBC AUTO-ENTMCNC: 30.5 G/DL (ref 31.4–37.4)
MCV RBC AUTO: 82 FL (ref 82–98)
MONOCYTES # BLD AUTO: 0.41 THOUSAND/ÂΜL (ref 0.17–1.22)
MONOCYTES NFR BLD AUTO: 8 % (ref 4–12)
NEUTROPHILS # BLD AUTO: 4.14 THOUSANDS/ÂΜL (ref 1.85–7.62)
NEUTS SEG NFR BLD AUTO: 74 % (ref 43–75)
NRBC BLD AUTO-RTO: 0 /100 WBCS
PLATELET # BLD AUTO: 138 THOUSANDS/UL (ref 149–390)
PMV BLD AUTO: 9.4 FL (ref 8.9–12.7)
POTASSIUM SERPL-SCNC: 3.3 MMOL/L (ref 3.5–5.3)
PROCALCITONIN SERPL-MCNC: 0.23 NG/ML
RBC # BLD AUTO: 4.59 MILLION/UL (ref 3.88–5.62)
SODIUM SERPL-SCNC: 136 MMOL/L (ref 135–147)
WBC # BLD AUTO: 5.5 THOUSAND/UL (ref 4.31–10.16)

## 2025-07-06 PROCEDURE — 84145 PROCALCITONIN (PCT): CPT | Performed by: INTERNAL MEDICINE

## 2025-07-06 PROCEDURE — 85025 COMPLETE CBC W/AUTO DIFF WBC: CPT | Performed by: INTERNAL MEDICINE

## 2025-07-06 PROCEDURE — 83735 ASSAY OF MAGNESIUM: CPT | Performed by: INTERNAL MEDICINE

## 2025-07-06 PROCEDURE — 80048 BASIC METABOLIC PNL TOTAL CA: CPT | Performed by: INTERNAL MEDICINE

## 2025-07-06 PROCEDURE — 99239 HOSP IP/OBS DSCHRG MGMT >30: CPT | Performed by: INTERNAL MEDICINE

## 2025-07-06 PROCEDURE — 82948 REAGENT STRIP/BLOOD GLUCOSE: CPT

## 2025-07-06 RX ORDER — OSELTAMIVIR PHOSPHATE 75 MG/1
75 CAPSULE ORAL EVERY 12 HOURS SCHEDULED
Qty: 6 CAPSULE | Refills: 0 | Status: SHIPPED | OUTPATIENT
Start: 2025-07-06 | End: 2025-07-09

## 2025-07-06 RX ORDER — POTASSIUM CHLORIDE 1500 MG/1
40 TABLET, EXTENDED RELEASE ORAL ONCE
Status: COMPLETED | OUTPATIENT
Start: 2025-07-06 | End: 2025-07-06

## 2025-07-06 RX ADMIN — PIPERACILLIN AND TAZOBACTAM 4.5 G: 4; .5 INJECTION, POWDER, FOR SOLUTION INTRAVENOUS at 10:17

## 2025-07-06 RX ADMIN — EMPAGLIFLOZIN 20 MG: 10 TABLET, FILM COATED ORAL at 08:03

## 2025-07-06 RX ADMIN — HEPARIN SODIUM 5000 UNITS: 5000 INJECTION INTRAVENOUS; SUBCUTANEOUS at 06:36

## 2025-07-06 RX ADMIN — Medication 2000 UNITS: at 08:02

## 2025-07-06 RX ADMIN — MULTIPLE VITAMINS W/ MINERALS TAB 1 TABLET: TAB ORAL at 08:03

## 2025-07-06 RX ADMIN — POTASSIUM CHLORIDE 40 MEQ: 1500 TABLET, EXTENDED RELEASE ORAL at 11:49

## 2025-07-06 RX ADMIN — MONTELUKAST 10 MG: 10 TABLET, FILM COATED ORAL at 08:03

## 2025-07-06 RX ADMIN — GUAIFENESIN 600 MG: 600 TABLET, EXTENDED RELEASE ORAL at 08:03

## 2025-07-06 RX ADMIN — VALACYCLOVIR HYDROCHLORIDE 500 MG: 500 TABLET, FILM COATED ORAL at 08:03

## 2025-07-06 RX ADMIN — FUROSEMIDE 20 MG: 20 TABLET ORAL at 08:03

## 2025-07-06 RX ADMIN — OSELTAMIVIR PHOSPHATE 75 MG: 75 CAPSULE ORAL at 08:03

## 2025-07-06 RX ADMIN — FERROUS SULFATE TAB 325 MG (65 MG ELEMENTAL FE) 325 MG: 325 (65 FE) TAB at 08:03

## 2025-07-06 RX ADMIN — ATORVASTATIN CALCIUM 40 MG: 40 TABLET, FILM COATED ORAL at 08:02

## 2025-07-06 RX ADMIN — PIPERACILLIN AND TAZOBACTAM 4.5 G: 4; .5 INJECTION, POWDER, FOR SOLUTION INTRAVENOUS at 02:26

## 2025-07-06 RX ADMIN — PANTOPRAZOLE SODIUM 40 MG: 40 TABLET, DELAYED RELEASE ORAL at 06:36

## 2025-07-06 RX ADMIN — METOPROLOL SUCCINATE 100 MG: 50 TABLET, EXTENDED RELEASE ORAL at 08:02

## 2025-07-06 RX ADMIN — ASPIRIN 81 MG: 81 TABLET, COATED ORAL at 08:03

## 2025-07-06 NOTE — UTILIZATION REVIEW
Initial Clinical Review    Admission: Date/Time/Statement:   Admission Orders (From admission, onward)       Ordered        07/04/25 2228  INPATIENT ADMISSION  Once                          Orders Placed This Encounter   Procedures    INPATIENT ADMISSION     Standing Status:   Standing     Number of Occurrences:   1     Level of Care:   Med Surg [16]     Estimated length of stay:   More than 2 Midnights     Certification:   I certify that inpatient services are medically necessary for this patient for a duration of greater than two midnights. See H&P and MD Progress Notes for additional information about the patient's course of treatment.     ED Arrival Information       Expected   -    Arrival   7/4/2025 20:02    Acuity   Urgent              Means of arrival   Walk-In    Escorted by   Family Member    Service   Hospitalist    Admission type   Emergency              Arrival complaint   coughing/fever/chills             Chief Complaint   Patient presents with    Fever     Patient presents to ED with complaint of dyspnea, cough, fever, back pain, abd pain for the past two weeks. Pt returned from [place] today, he was there for 2 weeks. Pt currently receiving chemo for multiple myeloma.        Initial Presentation: 79 y.o. male to the ED from home with complaints of fever, cough, dyspnea.  Admitted to inpatient for Flu A. Returned from traveling to [place] today.  H/O hypertension, hyperlipidemia, CAD, CABG, multiple myeloma, lymphoma, colon cancer. Arrives with cough, shortness of breath, scattered rhonchi heard in lungs. FLUA  +. Procal +, , potassium 3.3. Blood cultures pending.  Started on IV abx. Given IV lasix in thE D.     Date: 7/5  Day 2:  Continue with IV abx.  Blood cultures pending. Lungs clear.  Transition back to home dose of po Lasix.     Date: 7/6  Day 3: Has surpassed a 2nd midnight with active treatments and services. Initially admitted for FLU A. Being treated with IV abx.  Lungs are clear.  Blood cultures pending.   GCS 15.      ED Treatment-Medication Administration from 07/04/2025 2002 to 07/04/2025 2848         Date/Time Order Dose Route Action     07/04/2025 2132 acetaminophen (TYLENOL) tablet 975 mg 975 mg Oral Given     07/04/2025 2205 furosemide (LASIX) injection 40 mg 40 mg Intravenous Given     07/04/2025 2205 piperacillin-tazobactam (ZOSYN) IVPB 4.5 g 4.5 g Intravenous New Bag            Scheduled Medications:  aspirin, 81 mg, Oral, Daily  atorvastatin, 40 mg, Oral, Daily  cholecalciferol, 2,000 Units, Oral, Daily  Empagliflozin, 20 mg, Oral, QAM  ferrous sulfate, 325 mg, Oral, Daily With Breakfast  furosemide, 20 mg, Oral, Daily  guaiFENesin, 600 mg, Oral, Q12H DONNIE  heparin (porcine), 5,000 Units, Subcutaneous, Q8H DONNIE  metoprolol succinate, 100 mg, Oral, Daily  montelukast, 10 mg, Oral, Daily  multivitamin-minerals, 1 tablet, Oral, Daily  oseltamivir, 75 mg, Oral, Q12H DONNIE  pantoprazole, 40 mg, Oral, Early Morning  piperacillin-tazobactam, 4.5 g, Intravenous, Q8H  valACYclovir, 500 mg, Oral, Daily      Continuous IV Infusions:     PRN Meds:  acetaminophen, 650 mg, Oral, Q6H PRN  albuterol, 2 puff, Inhalation, Q4H PRN  benzonatate, 100 mg, Oral, TID PRN  trimethobenzamide, 200 mg, Intramuscular, Q6H PRN      ED Triage Vitals [07/04/25 2012]   Temperature Pulse Respirations Blood Pressure SpO2 Pain Score   99.3 °F (37.4 °C) 90 18 149/67 95 % 2     Weight (last 2 days)       Date/Time Weight    07/04/25 23:11:41 64 (141.2)    07/04/25 2012 63.5 (140)            Vital Signs (last 3 days)       Date/Time Temp Pulse Resp BP MAP (mmHg) SpO2 O2 Device Patient Position - Orthostatic VS [place] Coma Scale Score Pain    07/06/25 08:02:12 98.3 °F (36.8 °C) 65 16 138/59 85 94 % -- Lying -- --    07/06/25 04:25:03 98.4 °F (36.9 °C) 64 16 134/58 83 93 % -- Lying -- No Pain    07/06/25 02:33:07 98.1 °F (36.7 °C) 62 -- -- -- 91 % -- -- -- --    07/06/25 0100 -- -- -- -- -- -- None (Room air) -- 15 No Pain     07/05/25 23:33:08 97.7 °F (36.5 °C) 62 -- -- -- 92 % -- -- -- --    07/05/25 2134 -- -- -- -- -- -- -- -- -- Med Not Given for Pain - for MAR use only    07/05/25 20:38:50 99.5 °F (37.5 °C) 84 17 129/57 81 94 % None (Room air) Lying -- No Pain    07/05/25 1824 97 °F (36.1 °C) -- -- -- -- -- -- -- -- --    07/05/25 1604 101.7 °F (38.7 °C) -- -- -- -- -- -- -- -- --    07/05/25 1441 -- -- -- -- -- -- -- -- -- Med Not Given for Pain - for MAR use only    07/05/25 1433 102 °F (38.9 °C) -- -- -- -- -- -- -- -- --    07/05/25 14:19:21 -- 73 16 131/58 82 92 % -- -- -- --    07/05/25 0800 -- -- -- -- -- -- None (Room air) -- 15 No Pain    07/05/25 07:05:18 99.5 °F (37.5 °C) 90 16 134/58 83 89 % -- -- -- --    07/05/25 07:05:05 99.5 °F (37.5 °C) 77 -- 134/58 83 90 % -- -- -- --    07/05/25 0638 -- -- -- -- -- -- -- -- -- Med Not Given for Pain - for MAR use only    07/05/25 05:43:19 99.6 °F (37.6 °C) 74 18 131/58 82 93 % None (Room air) Lying -- No Pain    07/04/25 23:14:02 -- 71 -- 122/51 75 92 % None (Room air) Lying -- --    07/04/25 23:11:41 98.5 °F (36.9 °C) 73 16 115/48 70 93 % None (Room air) Lying -- No Pain    07/04/25 2310 -- -- -- -- -- -- -- -- 15 --    07/04/25 2230 -- 79 17 139/66 95 93 % None (Room air) -- -- --    07/04/25 2215 -- 76 16 136/62 89 94 % None (Room air) -- -- --    07/04/25 2145 -- 84 17 145/66 95 94 % None (Room air) -- -- --    07/04/25 2132 -- -- -- -- -- -- -- -- -- Med Not Given for Pain - for MAR use only    07/04/25 2130 -- 77 19 153/66 95 94 % None (Room air) -- -- --    07/04/25 2115 -- 76 17 139/65 94 94 % None (Room air) -- -- --    07/04/25 2030 -- -- -- -- -- -- -- -- -- 2    07/04/25 2029 -- -- -- -- -- -- None (Room air) -- 15 --    07/04/25 2012 99.3 °F (37.4 °C) 90 18 149/67 -- 95 % None (Room air) Sitting -- 2              Pertinent Labs/Diagnostic Test Results:   Radiology:  XR chest portable   Final Interpretation by Adeline Howard MD (07/05 2055)      Mild pulmonary  venous congestion.            Workstation performed: [id number]           Cardiology:  ECG 12 lead   Final Result by Myriam Gill MD (07/05 0838)   Sinus rhythm   Non-specific intra-ventricular conduction block   Cannot rule out Anterior infarct (cited on or before 26-May-2025)   Abnormal ECG   When compared with ECG of 26-May-2025 07:08,   Premature ventricular complexes are no longer Present   Confirmed by Myriam Gill ([id number]) on 7/5/2025 8:38:11 AM        GI:  No orders to display       Results from last 7 days   Lab Units 07/04/25 2031   SARS-COV-2  Negative     Results from last 7 days   Lab Units 07/06/25 0420 07/05/25 0544 07/04/25 2031   WBC Thousand/uL 5.50 6.80 8.41   HEMOGLOBIN g/dL 11.5* 13.1 12.8   HEMATOCRIT % 37.7 41.8 41.3   PLATELETS Thousands/uL 138* 179 177   TOTAL NEUT ABS Thousands/µL 4.14  --  7.14         Results from last 7 days   Lab Units 07/06/25 0420 07/05/25 0544 07/04/25 2031   SODIUM mmol/L 136 139 135   POTASSIUM mmol/L 3.3* 3.2* 3.3*   CHLORIDE mmol/L 100 99 99   CO2 mmol/L 30 32 26   ANION GAP mmol/L 6 8 10   BUN mg/dL 12 10 12   CREATININE mg/dL 0.97 0.94 0.92   EGFR ml/min/1.73sq m 74 77 79   CALCIUM mg/dL 8.0* 8.6 8.8   MAGNESIUM mg/dL 2.0 2.1  --      Results from last 7 days   Lab Units 07/04/25 2031   AST U/L 15   ALT U/L 11   ALK PHOS U/L 111*   TOTAL PROTEIN g/dL 6.4   ALBUMIN g/dL 3.7   TOTAL BILIRUBIN mg/dL 0.45     Results from last 7 days   Lab Units 07/06/25  0800 07/05/25 2036 07/05/25  1637 07/05/25  1100 07/05/25  0703   POC GLUCOSE mg/dl 89 122 117 117 109     Results from last 7 days   Lab Units 07/06/25 0420 07/05/25 0544 07/04/25 2031   GLUCOSE RANDOM mg/dL 107 97 187*     Results from last 7 days   Lab Units 07/04/25 2211 07/04/25 2031   HS TNI 0HR ng/L  --  17   HS TNI 2HR ng/L 19  --    HSTNI D2 ng/L 2  --          Results from last 7 days   Lab Units 07/04/25 2031   PROTIME seconds 16.0*   INR  1.23*   PTT seconds 33         Results from  last 7 days   Lab Units 07/06/25  0420 07/05/25  0544 07/04/25 2031   PROCALCITONIN ng/ml 0.23 0.32* 0.28*     Results from last 7 days   Lab Units 07/04/25 2031   LACTIC ACID mmol/L 1.7             Results from last 7 days   Lab Units 07/04/25 2031   BNP pg/mL 931*                                     Results from last 7 days   Lab Units 07/04/25  2241   CLARITY UA  Clear   COLOR UA  Yellow   SPEC GRAV UA  1.015   PH UA  5.5   GLUCOSE UA mg/dl 1000 (1%)*   KETONES UA mg/dl Negative   BLOOD UA  Trace*   PROTEIN UA mg/dl Trace*   NITRITE UA  Negative   BILIRUBIN UA  Negative   UROBILINOGEN UA (BE) mg/dl <2.0   LEUKOCYTES UA  Negative   WBC UA /hpf None Seen   RBC UA /hpf 0-1   BACTERIA UA /hpf None Seen   EPITHELIAL CELLS WET PREP /hpf None Seen     Results from last 7 days   Lab Units 07/05/25  0205 07/04/25 2031   STREP PNEUMONIAE ANTIGEN, URINE  Negative  --    LEGIONELLA URINARY ANTIGEN  Negative  --    INFLUENZA A PCR   --  Positive*   INFLUENZA B PCR   --  Negative   RSV PCR   --  Negative       Results from last 7 days   Lab Units 07/05/25  1434 07/04/25 2032 07/04/25 2031   BLOOD CULTURE   --  No Growth at 24 hrs. No Growth at 24 hrs.   GRAM STAIN RESULT  1+ Epithelial cells per low power field*  1+ Polys*  2+ Gram positive cocci in pairs*  Rare Budding yeast*  --   --          Past Medical History[1]  Present on Admission:   Acute on chronic diastolic congestive heart failure (HCC)   Dyslipidemia   Lymphoma (HCC)   Essential hypertension   CVID (common variable immunodeficiency) ([facility])      Admitting Diagnosis: CHF (congestive heart failure) ([facility]) [I50.9]  Pneumonia [J18.9]  Influenza A [J10.1]  Age/Sex: 79 y.o. male    Network Utilization Review Department  ATTENTION: Please call with any questions or concerns to [phone] and carefully listen to the prompts so that you are directed to the right person. All voicemails are confidential.   For Discharge needs, contact Care Management DC Support Team  at [phone] opt. [phone]  Send all requests for admission clinical reviews, approved or denied determinations and any other requests to dedicated fax number below belonging to the campus where the patient is receiving treatment. List of dedicated fax numbers for the Facilities:  FACILITY NAME UR FAX NUMBER   ADMISSION DENIALS (Administrative/Medical Necessity) [phone]   DISCHARGE SUPPORT TEAM (NETWORK) [phone]   PARENT CHILD HEALTH (Maternity/NICU/Pediatrics) [phone]   [facility] [phone]   [facility] [phone]   [facility] [phone]   [facility] [phone]   Critical access hospital [phone]   [facility] [phone]   [facility] [phone]   [facility] [phone]   [facility] [phone]   [facility] [phone]   [facility] [phone]   [facility] [phone]              [1]   Past Medical History:  Diagnosis Date    Acute on chronic systolic (congestive) heart failure (HCC) 05/15/2024    Acute respiratory failure with hypoxia (HCC) 04/20/2022    Cataract     Colitis     Colon polyp     Fatty liver     History of chemotherapy     History of radiation therapy     History of shingles 2018    History of transfusion     Hyperlipidemia     Malignant tumor of cecum (HCC) 09/26/2013    Multiple myeloma (HCC)     Pneumonia 08/11/22    Sepsis without acute organ dysfunction (HCC) 02/04/2023    Shingles 2017

## 2025-07-06 NOTE — PLAN OF CARE
Problem: Potential for Falls  Goal: Patient will remain free of falls  Description: INTERVENTIONS:  - Educate patient/family on patient safety including physical limitations  - Instruct patient to call for assistance with activity   - Consider consulting OT/PT to assist with strengthening/mobility based on AM PAC & JH-HLM score  - Consult OT/PT to assist with strengthening/mobility   - Keep Call bell within reach  - Keep bed low and locked with side rails adjusted as appropriate  - Keep care items and personal belongings within reach  - Initiate and maintain comfort rounds  - Make Fall Risk Sign visible to staff  - Offer Toileting every 2 Hours, in advance of need  - Initiate/Maintain bed/chair alarm if needed  - Obtain necessary fall risk management equipment: walker for ambulation if needed  - Apply yellow socks and bracelet for high fall risk patients  - Consider moving patient to room near nurses station  Outcome: Progressing     Problem: PAIN - ADULT  Goal: Verbalizes/displays adequate comfort level or baseline comfort level  Description: Interventions:  - Encourage patient to monitor pain and request assistance  - Assess pain using appropriate pain scale  - Administer analgesics as ordered based on type and severity of pain and evaluate response  - Implement non-pharmacological measures as appropriate and evaluate response  - Consider cultural and social influences on pain and pain management  - Notify physician/advanced practitioner if interventions unsuccessful or patient reports new pain  - Educate patient/family on pain management process including their role and importance of  reporting pain   - Provide non-pharmacologic/complimentary pain relief interventions  Outcome: Progressing     Problem: INFECTION - ADULT  Goal: Absence or prevention of progression during hospitalization  Description: INTERVENTIONS:  - Assess and monitor for signs and symptoms of infection  - Monitor lab/diagnostic results  -  Monitor all insertion sites, i.e. indwelling lines, tubes, and drains  - Monitor endotracheal if appropriate and nasal secretions for changes in amount and color  - [place] appropriate cooling/warming therapies per order  - Administer medications as ordered  - Instruct and encourage patient and family to use good hand hygiene technique  - Identify and instruct in appropriate isolation precautions for identified infection/condition  Outcome: Progressing  Goal: Absence of fever/infection during neutropenic period  Description: INTERVENTIONS:  - Monitor WBC  - Perform strict hand hygiene  - Limit to healthy visitors only  - No plants, dried, fresh or silk flowers with irwin in patient room  Outcome: Progressing     Problem: SAFETY ADULT  Goal: Patient will remain free of falls  Description: INTERVENTIONS:  - Educate patient/family on patient safety including physical limitations  - Instruct patient to call for assistance with activity   - Consider consulting OT/PT to assist with strengthening/mobility based on AM PAC & -HLM score  - Consult OT/PT to assist with strengthening/mobility   - Keep Call bell within reach  - Keep bed low and locked with side rails adjusted as appropriate  - Keep care items and personal belongings within reach  - Initiate and maintain comfort rounds  - Make Fall Risk Sign visible to staff  - Offer Toileting every 2 Hours, in advance of need  - Initiate/Maintain bed/chair alarm if needed  - Obtain necessary fall risk management equipment: walker for ambulation if needed  - Apply yellow socks and bracelet for high fall risk patients  - Consider moving patient to room near nurses station  Outcome: Progressing  Goal: Maintain or return to baseline ADL function  Description: INTERVENTIONS:  -  Assess patient's ability to carry out ADLs; assess patient's baseline for ADL function and identify physical deficits which impact ability to perform ADLs (bathing, care of mouth/teeth, toileting,  grooming, dressing, etc.)  - Assess/evaluate cause of self-care deficits   - Assess range of motion  - Assess patient's mobility; develop plan if impaired  - Assess patient's need for assistive devices and provide as appropriate  - Encourage maximum independence but intervene and supervise when necessary  - Involve family in performance of ADLs  - Assess for home care needs following discharge   - Consider OT consult to assist with ADL evaluation and planning for discharge  - Provide patient education as appropriate  - Monitor functional capacity and physical performance, use of AM PAC & JH-HLM   - Monitor gait, balance and fatigue with ambulation    Outcome: Progressing  Goal: Maintains/Returns to pre admission functional level  Description: INTERVENTIONS:  - Perform AM-PAC 6 Click Basic Mobility/ Daily Activity assessment daily.  - Set and communicate daily mobility goal to care team and patient/family/caregiver.   - Collaborate with rehabilitation services on mobility goals if consulted  - Perform Range of Motion 2 times a day.  - Patient ambulates and repositions independently  - Out of bed for toileting  - Record patient progress and toleration of activity level   Outcome: Progressing     Problem: DISCHARGE PLANNING  Goal: Discharge to home or other facility with appropriate resources  Description: INTERVENTIONS:  - Identify barriers to discharge w/patient and caregiver  - Arrange for needed discharge resources and transportation as appropriate  - Identify discharge learning needs (meds, wound care, etc.)  - Arrange for interpretive services to assist at discharge as needed  - Refer to Case Management Department for coordinating discharge planning if the patient needs post-hospital services based on physician/advanced practitioner order or complex needs related to functional status, cognitive ability, or social support system  Outcome: Progressing     Problem: Knowledge Deficit  Goal: Patient/family/caregiver  demonstrates understanding of disease process, treatment plan, medications, and discharge instructions  Description: Complete learning assessment and assess knowledge base.  Interventions:  - Provide teaching at level of understanding  - Provide teaching via preferred learning methods  Outcome: Progressing

## 2025-07-06 NOTE — ASSESSMENT & PLAN NOTE
Wt Readings from Last 3 Encounters:   07/04/25 64 kg (141 lb 3.2 oz)   06/09/25 66.7 kg (147 lb)   05/29/25 64.9 kg (143 lb)        Appears mildly hypervolemic on CXR. Reporting dyspnea. Suspect mostly from Flu A. Received dose of IV lasix 40 mg in the ER  Prior ECHO May 2025: EF 55-60%. No wall abnormalities identified but cannot be completely excluded from this study.   Home regimen:   Lasix 20 mg daily   Plan   Patient reports 5 pound weight loss over the past 2 weeks.  On exam does not appear hypervolemic.   Continue home medications at discharge

## 2025-07-06 NOTE — PLAN OF CARE
Problem: Potential for Falls  Goal: Patient will remain free of falls  Description: INTERVENTIONS:  - Educate patient/family on patient safety including physical limitations  - Instruct patient to call for assistance with activity   - Consider consulting OT/PT to assist with strengthening/mobility based on AM PAC & JH-HLM score  - Consult OT/PT to assist with strengthening/mobility   - Keep Call bell within reach  - Keep bed low and locked with side rails adjusted as appropriate  - Keep care items and personal belongings within reach  - Initiate and maintain comfort rounds  - Make Fall Risk Sign visible to staff  - Offer Toileting every 2 Hours, in advance of need  - Initiate/Maintain bed/chair alarm  - Obtain necessary fall risk management equipment  - Apply yellow socks and bracelet for high fall risk patients  - Consider moving patient to room near nurses station  Outcome: Progressing     Problem: PAIN - ADULT  Goal: Verbalizes/displays adequate comfort level or baseline comfort level  Description: Interventions:  - Encourage patient to monitor pain and request assistance  - Assess pain using appropriate pain scale  - Administer analgesics as ordered based on type and severity of pain and evaluate response  - Implement non-pharmacological measures as appropriate and evaluate response  - Consider cultural and social influences on pain and pain management  - Notify physician/advanced practitioner if interventions unsuccessful or patient reports new pain  - Educate patient/family on pain management process including their role and importance of  reporting pain   - Provide non-pharmacologic/complimentary pain relief interventions  Outcome: Progressing     Problem: INFECTION - ADULT  Goal: Absence or prevention of progression during hospitalization  Description: INTERVENTIONS:  - Assess and monitor for signs and symptoms of infection  - Monitor lab/diagnostic results  - Monitor all insertion sites, i.e. indwelling  lines, tubes, and drains  - Monitor endotracheal if appropriate and nasal secretions for changes in amount and color  - [place] appropriate cooling/warming therapies per order  - Administer medications as ordered  - Instruct and encourage patient and family to use good hand hygiene technique  - Identify and instruct in appropriate isolation precautions for identified infection/condition  Outcome: Progressing  Goal: Absence of fever/infection during neutropenic period  Description: INTERVENTIONS:  - Monitor WBC  - Perform strict hand hygiene  - Limit to healthy visitors only  - No plants, dried, fresh or silk flowers with irwin in patient room  Outcome: Progressing     Problem: SAFETY ADULT  Goal: Patient will remain free of falls  Description: INTERVENTIONS:  - Educate patient/family on patient safety including physical limitations  - Instruct patient to call for assistance with activity   - Consider consulting OT/PT to assist with strengthening/mobility based on AM PAC & JH-HLM score  - Consult OT/PT to assist with strengthening/mobility   - Keep Call bell within reach  - Keep bed low and locked with side rails adjusted as appropriate  - Keep care items and personal belongings within reach  - Initiate and maintain comfort rounds  - Make Fall Risk Sign visible to staff  - Offer Toileting every 2 Hours, in advance of need  - Initiate/Maintain bed/chair alarm  - Obtain necessary fall risk management equipment  - Apply yellow socks and bracelet for high fall risk patients  - Consider moving patient to room near nurses station  Outcome: Progressing  Goal: Maintain or return to baseline ADL function  Description: INTERVENTIONS:  -  Assess patient's ability to carry out ADLs; assess patient's baseline for ADL function and identify physical deficits which impact ability to perform ADLs (bathing, care of mouth/teeth, toileting, grooming, dressing, etc.)  - Assess/evaluate cause of self-care deficits   - Assess range of  motion  - Assess patient's mobility; develop plan if impaired  - Assess patient's need for assistive devices and provide as appropriate  - Encourage maximum independence but intervene and supervise when necessary  - Involve family in performance of ADLs  - Assess for home care needs following discharge   - Consider OT consult to assist with ADL evaluation and planning for discharge  - Provide patient education as appropriate  - Monitor functional capacity and physical performance, use of AM PAC & JH-HLM   - Monitor gait, balance and fatigue with ambulation    Outcome: Progressing  Goal: Maintains/Returns to pre admission functional level  Description: INTERVENTIONS:  - Perform AM-PAC 6 Click Basic Mobility/ Daily Activity assessment daily.  - Set and communicate daily mobility goal to care team and patient/family/caregiver.   - Collaborate with rehabilitation services on mobility goals if consulted  - Perform Range of Motion 3 times a day.  - Reposition patient every 2 hours.  - Dangle patient 3 times a day  - Stand patient 3 times a day  - Ambulate patient 3 times a day  - Out of bed to chair 3 times a day   - Out of bed for meals 3 times a day  - Out of bed for toileting  - Record patient progress and toleration of activity level   Outcome: Progressing     Problem: DISCHARGE PLANNING  Goal: Discharge to home or other facility with appropriate resources  Description: INTERVENTIONS:  - Identify barriers to discharge w/patient and caregiver  - Arrange for needed discharge resources and transportation as appropriate  - Identify discharge learning needs (meds, wound care, etc.)  - Arrange for interpretive services to assist at discharge as needed  - Refer to Case Management Department for coordinating discharge planning if the patient needs post-hospital services based on physician/advanced practitioner order or complex needs related to functional status, cognitive ability, or social support system  Outcome:  Progressing     Problem: Knowledge Deficit  Goal: Patient/family/caregiver demonstrates understanding of disease process, treatment plan, medications, and discharge instructions  Description: Complete learning assessment and assess knowledge base.  Interventions:  - Provide teaching at level of understanding  - Provide teaching via preferred learning methods  Outcome: Progressing

## 2025-07-06 NOTE — DISCHARGE SUMMARY
Discharge Summary - Hospitalist   Name: Gabe Pickard 79 y.o. male I MRN: [id number]  Unit/Bed#: -01 I Date of Admission: 7/4/2025   Date of Service: 7/6/2025 I Hospital Day: 2     Assessment & Plan  Influenza A  Presented due to dyspnea, cough, congestion, and fevers/chills x 1 week.   Flu A +. Covid/Flu B/RSV -   CXR pending official read. Possible infiltrate noted.   Due to Fevers/elevated procal (0.28) started on IV zosyn (allergies)  Not meeting SIRS.  Multifocal PNA admission in May.   Start Tamiflu (immunocompromised)  Manage symptoms   Urinary antigens are negative  BC are negative to date  Patient will be discharged on Tamiflu and Augmentin to complete the course  Patient is saturating well on room air stable for discharge  Acute on chronic diastolic congestive heart failure (HCC)  Wt Readings from Last 3 Encounters:   07/04/25 64 kg (141 lb 3.2 oz)   06/09/25 66.7 kg (147 lb)   05/29/25 64.9 kg (143 lb)        Appears mildly hypervolemic on CXR. Reporting dyspnea. Suspect mostly from Flu A. Received dose of IV lasix 40 mg in the ER  Prior ECHO May 2025: EF 55-60%. No wall abnormalities identified but cannot be completely excluded from this study.   Home regimen:   Lasix 20 mg daily   Plan   Patient reports 5 pound weight loss over the past 2 weeks.  On exam does not appear hypervolemic.   Continue home medications at discharge  Lymphoma (HCC)  Under surveillance.  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 male patient who originally presented to the hospital on   Admission Orders (From admission, onward)       Ordered        07/04/25 2228  INPATIENT ADMISSION  Once                         due to influenza A and possible bacterial  pneumonia.  Patient was treated with Tamiflu and  Zosyn.  Patient remains afebrile and is hemodynamically stable cultures are negative to date.  Patient will be discharged on Tamiflu and Augmentin to complete the course.  Patient is saturating well on room air and does not require home oxygen at discharge.  On Exam-  Chest-bilateral air entry, clear to auscultation  Abdomen-soft, nontender  Heart-S1 S2 regular  Extremities-no pedal edema or calf tenderness  Neuro-alert awake oriented x3.  No focal deficits    Please see above list of diagnoses and related plan for additional information.   Follow-up with PCP as outpatient    CONSULTING PROVIDERS   None    PROCEDURES PERFORMED  * No surgery found *    RADIOLOGY RESULTS  XR chest portable  Result Date: 7/5/2025  Narrative: XR CHEST PORTABLE INDICATION: cough. COMPARISON: CXR 5/26/2025, 3/13/2025, 6/12/2024, chest CT 5/26/2025. FINDINGS: Mild pulmonary venous congestion. Chronic scar and pleural thickening in the left lung base. No pneumothorax or pleural effusion. Mild cardiomegaly, CABG. AVR. Moderate left glenohumeral degenerative disease. Normal upper abdomen.     Impression: Mild pulmonary venous congestion. Workstation performed: [id number]       LABS  Results from last 7 days   Lab Units 07/06/25  0420 07/05/25 0544 07/04/25 2031   WBC Thousand/uL 5.50 6.80 8.41   HEMOGLOBIN g/dL 11.5* 13.1 12.8   HEMATOCRIT % 37.7 41.8 41.3   MCV fL 82 82 83   PLATELETS Thousands/uL 138* 179 177   INR   --   --  1.23*     Results from last 7 days   Lab Units 07/06/25  0420 07/05/25  0544 07/04/25 2031   SODIUM mmol/L 136 139 135   POTASSIUM mmol/L 3.3* 3.2* 3.3*   CHLORIDE mmol/L 100 99 99   CO2 mmol/L 30 32 26   BUN mg/dL 12 10 12   CREATININE mg/dL 0.97 0.94 0.92   CALCIUM mg/dL 8.0* 8.6 8.8   ALBUMIN g/dL  --   --  3.7   TOTAL BILIRUBIN mg/dL  --   --  0.45   ALK PHOS U/L  --   --  111*   ALT U/L  --   --  11   AST U/L  --   --  15   EGFR ml/min/1.73sq m 74 77 79   GLUCOSE  RANDOM mg/dL 107 97 187*     Results from last 7 days   Lab Units 07/04/25  2211 07/04/25 2031   HS TNI 0HR ng/L  --  17   HS TNI 2HR ng/L 19  --               Results from last 7 days   Lab Units 07/06/25  1110 07/06/25  0800 07/05/25  2036 07/05/25  1637 07/05/25  1100 07/05/25  0703   POC GLUCOSE mg/dl 116 89 122 117 117 109             Results from last 7 days   Lab Units 07/06/25  0420 07/05/25  0544 07/04/25 2031   LACTIC ACID mmol/L  --   --  1.7   PROCALCITONIN ng/ml 0.23   < > 0.28*    < > = values in this interval not displayed.           Cultures:   Results from last 7 days   Lab Units 07/04/25  2241   COLOR UA  Yellow   CLARITY UA  Clear   SPEC GRAV UA  1.015   PH UA  5.5   LEUKOCYTES UA  Negative   NITRITE UA  Negative   GLUCOSE UA mg/dl 1000 (1%)*   KETONES UA mg/dl Negative   BILIRUBIN UA  Negative   BLOOD UA  Trace*      Results from last 7 days   Lab Units 07/04/25  2241   RBC UA /hpf 0-1   WBC UA /hpf None Seen   EPITHELIAL CELLS WET PREP /hpf None Seen   BACTERIA UA /hpf None Seen      Results from last 7 days   Lab Units 07/05/25  1434 07/05/25  0205 07/04/25 2032 07/04/25 2031   BLOOD CULTURE   --   --  No Growth at 24 hrs. No Growth at 24 hrs.   SPUTUM CULTURE  Culture too young- will reincubate  --   --   --    GRAM STAIN RESULT  1+ Epithelial cells per low power field*  1+ Polys*  2+ Gram positive cocci in pairs*  Rare Budding yeast*  --   --   --    INFLUENZA A PCR   --   --   --  Positive*   LEGIONELLA URINARY ANTIGEN   --  Negative  --   --    STREP PNEUMONIAE ANTIGEN, URINE   --  Negative  --   --        Condition at Discharge:  good      Discharge instructions/Information to patient and family:   See after visit summary for information provided to patient and family.      Provisions for Follow-Up Care:  See after visit summary for information related to follow-up care and any pertinent home health orders.      Disposition:     Home       Discharge Statement:  I spent 40 minutes  discharging the patient. This time was spent on the day of discharge. I had direct contact with the patient on the day of discharge. Greater than 50% of the total time was spent examining patient, answering all patient questions, arranging and discussing plan of care with patient as well as directly providing post-discharge instructions.  Additional time then spent on discharge activities.    Discharge Medications:  See after visit summary for reconciled discharge medications provided to patient and family.      ** Please Note: This note has been constructed using a voice recognition system **

## 2025-07-06 NOTE — ASSESSMENT & PLAN NOTE
Presented due to dyspnea, cough, congestion, and fevers/chills x 1 week.   Flu A +. Covid/Flu B/RSV -   CXR pending official read. Possible infiltrate noted.   Due to Fevers/elevated procal (0.28) started on IV zosyn (allergies)  Not meeting SIRS.  Multifocal PNA admission in May.   Start Tamiflu (immunocompromised)  Manage symptoms   Urinary antigens are negative  BC are negative to date  Patient will be discharged on Tamiflu and Augmentin to complete the course  Patient is saturating well on room air stable for discharge

## 2025-07-07 ENCOUNTER — TRANSITIONAL CARE MANAGEMENT (OUTPATIENT)
Dept: FAMILY MEDICINE CLINIC | Facility: HOSPITAL | Age: 80
End: 2025-07-07

## 2025-07-07 NOTE — UTILIZATION REVIEW
NOTIFICATION OF INPATIENT ADMISSION   AUTHORIZATION REQUEST   SERVICING FACILITY:   [place] [address]  Tax ID: [id number]  NPI: [id number] ATTENDING PROVIDER:  Attending Name and NPI#: Rishabh Marquis Md [[id number]]  Address: [address] [place] [address]  Phone: [phone]   ADMISSION INFORMATION:  Place of Service: Inpatient [facility]  Place of Service Code: 21  Inpatient Admission Date/Time: 7/4/25 10:28 PM  Discharge Date/Time: 7/6/2025 12:18 PM  Admitting Diagnosis Code/Description:  CHF (congestive heart failure) (HCC) [I50.9]  Pneumonia [J18.9]  Influenza A [J10.1]     UTILIZATION REVIEW CONTACT:  Rosamaria Horne, Utilization   Network Utilization Review Department  Phone: [phone]  Fax: [phone]  Email: [email or web address]  Contact for approvals/pending authorizations, clinical reviews, and discharge.     PHYSICIAN ADVISORY SERVICES:  Medical Necessity Denial & Slmh-yg-Ruvz Review  Phone: [phone]  Fax: [phone]  Email: [email or web address]     DISCHARGE SUPPORT TEAM:  For Patients Discharge Needs & Updates  Phone: [phone] opt. 2 Fax: [phone]  Email: [email or web address]

## 2025-07-08 LAB
BACTERIA SPT RESP CULT: ABNORMAL
BACTERIA SPT RESP CULT: ABNORMAL
GRAM STN SPEC: ABNORMAL

## 2025-07-08 NOTE — UTILIZATION REVIEW
NOTIFICATION OF ADMISSION DISCHARGE   This is a Notification of Discharge from [facility]. Please be advised that this patient has been discharge from our facility. Below you will find the admission and discharge date and time including the patient’s disposition.   UTILIZATION REVIEW CONTACT:  Utilization Review Assistants  Network Utilization Review Department  Phone: [phone] x carefully listen to the prompts. All voicemails are confidential.  Email: [email or web address]     ADMISSION INFORMATION  PRESENTATION DATE: 7/4/2025  8:14 PM  OBERVATION ADMISSION DATE: N/A  INPATIENT ADMISSION DATE: 7/4/25 10:28 PM   DISCHARGE DATE: 7/6/2025 12:18 PM   DISPOSITION:Home/Self Care    Network Utilization Review Department  ATTENTION: Please call with any questions or concerns to [phone] and carefully listen to the prompts so that you are directed to the right person. All voicemails are confidential.   For Discharge needs, contact Care Management DC Support Team at [phone] opt. 2  Send all requests for admission clinical reviews, approved or denied determinations and any other requests to dedicated fax number below belonging to the campus where the patient is receiving treatment. List of dedicated fax numbers for the Facilities:  FACILITY NAME UR FAX NUMBER   ADMISSION DENIALS (Administrative/Medical Necessity) [phone]   DISCHARGE SUPPORT TEAM ([facility]) [phone]   PARENT CHILD HEALTH (Maternity/NICU/Pediatrics) [phone]   [facility] [phone]   [facility] [phone]   [facility] [phone]   [facility] [phone]   [facility] [phone]   [facility] [phone]   [facility] [phone]   [facility] [phone]   [facility]  [facility] [phone]   [facility] [phone]   [facility] [phone]   [facility] [phone]          no

## 2025-07-10 ENCOUNTER — OFFICE VISIT (OUTPATIENT)
Dept: ENDOCRINOLOGY | Facility: HOSPITAL | Age: 80
End: 2025-07-10
Payer: COMMERCIAL

## 2025-07-10 VITALS
DIASTOLIC BLOOD PRESSURE: 66 MMHG | HEART RATE: 71 BPM | WEIGHT: 141.4 LBS | HEIGHT: 70 IN | SYSTOLIC BLOOD PRESSURE: 124 MMHG | BODY MASS INDEX: 20.24 KG/M2

## 2025-07-10 DIAGNOSIS — E78.5 DYSLIPIDEMIA: ICD-10-CM

## 2025-07-10 DIAGNOSIS — E11.9 TYPE 2 DIABETES MELLITUS WITHOUT COMPLICATION, WITHOUT LONG-TERM CURRENT USE OF INSULIN (HCC): Primary | ICD-10-CM

## 2025-07-10 DIAGNOSIS — Z95.1 HX OF CABG: ICD-10-CM

## 2025-07-10 DIAGNOSIS — I10 ESSENTIAL HYPERTENSION: ICD-10-CM

## 2025-07-10 LAB
BACTERIA BLD CULT: NORMAL
BACTERIA BLD CULT: NORMAL

## 2025-07-10 PROCEDURE — 99214 OFFICE O/P EST MOD 30 MIN: CPT | Performed by: STUDENT IN AN ORGANIZED HEALTH CARE EDUCATION/TRAINING PROGRAM

## 2025-07-10 NOTE — PROGRESS NOTES
Name: Gabe Pickard      : 1945      MRN: [id number]  Encounter Provider: Lucita La MD  Encounter Date: 7/10/2025   Encounter department: [facility]    No chief complaint on file.  :  Assessment & Plan  Type 2 diabetes mellitus without complication, without long-term current use of insulin (HCC)    Lab Results   Component Value Date    HGBA1C 5.8 (H) 2025            Essential hypertension         Hx of CABG         Dyslipidemia         Patient is a 77yM with PMHx of well controlled T2DM since 6 years on metformin/jardiance only with complication on CAD s/p CABG in  with dx of multiple myeloma on chemotherapy with some steriod induced hyperglycemia with other PMHx of hypertension, hyperlipidemia who presents today for Diabetes management. Last visit 3/25      1) T2DM:- Patients HbA1C continuous to stay at prediabetic range and has improved down to 5.8% without any medication changes with lifestyle changes.      Plan   - c/w Metformin 500mg daily   - c/w jardiance 25mg daily   - Check BG once a day- alternate fasting and bedtime  - Repeat HbA1C in 6 months      Screening  - Retinopathy- uptodate  - Lipid- uptodate 3/25 LDL <100, c/w Lipitor 20mg daily  - Urine microalbumin- Uptodate , abnormal, possibly also d/t his MM, repeat next visit. On Jardiance c/w      2) Hyperlipidemia:-  uptodate 3/25 LDL <100, c/w Lipitor 20mg daily  3) Hypertension:- BP in clinic 124/66  c/w toprol     Discussed with the patient and all questioned fully answered. He will call me if any problems arise.     Follow-up appointment in 6 months.     Pertinent Medical History     History of Present Illness     Gabe Pickard is a 79 y.o. malehistory of T2DM since 5-6 years on metformin/jardiance only w/o any microvascular complications with CAD s/p CABG with recent dx of MM on chemotherapy on Dex every month. Last visit     Current chemotherapy plan:- every month  "gets chemo  and gets decadron through it     Blood Sugar/Glucometer/Pump/CGM review: doesn't check it often  Current regime:-   Metformin 500mg daily, jardiance 25mg (had diarrhea on higher dose of metformin)     Medications tried/failed in past:- None  Hypoglycemic episodes: None  Hyperglycemia:-   denies any blurry vision, headaches. Polyuria d/t lasix use  Diet- see above  Activity- stays active  Weight- stable     last eye exam- Recently had exam 02/24, no retinopathy  last foot exam - Done 08/24  History of hypertension- Yes on toprol 100mg   History of hyperlipidemia- Yes on lipitor 40mg   Last lipid:- 06/25, LDL <100   Last urine microalbumina:- 6/25 43  Last HbA1C 6/25 5.8%, 3/25 6.2%, 8/24 5.8%. 02/24 5.9%  08/23 5.9%, 02/23:- 8.9%, 05/23 6.2%     Social hx:- Does not smoke, drink alcohol or use any other drugs   Family hX:- father and brother have t2DM    Review of Systems   Constitutional:  Negative for diaphoresis, fatigue and unexpected weight change.   Respiratory:  Negative for shortness of breath.    Cardiovascular:  Negative for chest pain and palpitations.   Gastrointestinal:  Negative for constipation and diarrhea.   Endocrine: Negative for polydipsia and polyuria.    as per HPI       Medical History Reviewed by provider this encounter:     .    Objective   /66   Pulse 71   Ht 5' 10\" (1.778 m)   Wt 64.1 kg (141 lb 6.4 oz)   BMI 20.29 kg/m²      Body mass index is 20.29 kg/m².  Wt Readings from Last 3 Encounters:   07/10/25 64.1 kg (141 lb 6.4 oz)   07/04/25 64 kg (141 lb 3.2 oz)   06/09/25 66.7 kg (147 lb)     Physical Exam  Vitals reviewed.   Constitutional:       General: He is not in acute distress.     Appearance: Normal appearance. He is well-developed and normal weight.   HENT:      Head: Normocephalic and atraumatic.     Eyes:      Conjunctiva/sclera: Conjunctivae normal.       Cardiovascular:      Rate and Rhythm: Normal rate and regular rhythm.   Pulmonary:      Effort: " Pulmonary effort is normal.      Breath sounds: Normal breath sounds.   Abdominal:      Palpations: Abdomen is soft.     Musculoskeletal:      Cervical back: Neck supple.     Skin:     General: Skin is warm and dry.      Capillary Refill: Capillary refill takes less than 2 seconds.     Neurological:      Mental Status: He is alert.     Psychiatric:         Mood and Affect: Mood normal.         Labs: I have reviewed pertinent labs including:   Lab Results   Component Value Date    HGBA1C 5.8 (H) 06/18/2025    HGBA1C 6.2 (H) 03/06/2025    HGBA1C 5.8 (H) 08/26/2024      Lab Results   Component Value Date    CREATININE 0.97 07/06/2025    CREATININE 0.94 07/05/2025    CREATININE 0.92 07/04/2025    BUN 12 07/06/2025     05/19/2017    K 3.3 (L) 07/06/2025     07/06/2025    CO2 30 07/06/2025      eGFR   Date Value Ref Range Status   07/06/2025 74 ml/min/1.73sq m Final      Cholesterol   Date Value Ref Range Status   05/19/2017 131 100 - 199 mg/dL Final     HDL   Date Value Ref Range Status   06/18/2025 25 (L) >39 mg/dL Final     Triglycerides   Date Value Ref Range Status   06/18/2025 70 0 - 149 mg/dL Final     T. Chol/HDL Ratio   Date Value Ref Range Status   06/18/2025 2.6 0.0 - 5.0 ratio Final     Comment:                                       T. Chol/HDL Ratio                                              Men  Women                                1/2 Avg.Risk  3.4    3.3                                    Avg.Risk  5.0    4.4                                 2X Avg.Risk  9.6    7.1                                 3X Avg.Risk 23.4   11.0        ALT   Date Value Ref Range Status   07/04/2025 11 7 - 52 U/L Final     Comment:     Specimen collection should occur prior to Sulfasalazine administration due to the potential for falsely depressed results.    06/18/2025 12 0 - 44 IU/L Final     AST   Date Value Ref Range Status   07/04/2025 15 13 - 39 U/L Final   06/18/2025 19 0 - 40 IU/L Final     Alkaline Phosphatase    Date Value Ref Range Status   07/04/2025 111 (H) 34 - 104 U/L Final   05/19/2017 97 39 - 117 IU/L Final     Total Bilirubin   Date Value Ref Range Status   05/19/2017 0.9 0.0 - 1.2 mg/dL Final      Radiology Results Review : No pertinent imaging studies reviewed.  There are no Patient Instructions on file for this visit.    Discussed with the patient and all questioned fully answered. He will call me if any problems arise.

## 2025-07-14 ENCOUNTER — OFFICE VISIT (OUTPATIENT)
Dept: FAMILY MEDICINE CLINIC | Facility: HOSPITAL | Age: 80
End: 2025-07-14
Payer: COMMERCIAL

## 2025-07-14 VITALS
TEMPERATURE: 66 F | HEART RATE: 97 BPM | WEIGHT: 141 LBS | HEIGHT: 70 IN | SYSTOLIC BLOOD PRESSURE: 122 MMHG | DIASTOLIC BLOOD PRESSURE: 62 MMHG | OXYGEN SATURATION: 95 % | BODY MASS INDEX: 20.19 KG/M2

## 2025-07-14 DIAGNOSIS — J10.1 INFLUENZA A: ICD-10-CM

## 2025-07-14 DIAGNOSIS — Z09 HOSPITAL DISCHARGE FOLLOW-UP: ICD-10-CM

## 2025-07-14 DIAGNOSIS — J18.9 PNEUMONIA DUE TO INFECTIOUS ORGANISM, UNSPECIFIED LATERALITY, UNSPECIFIED PART OF LUNG: Primary | ICD-10-CM

## 2025-07-14 PROCEDURE — 99495 TRANSJ CARE MGMT MOD F2F 14D: CPT | Performed by: NURSE PRACTITIONER

## 2025-07-14 RX ORDER — PREDNISONE 10 MG/1
TABLET ORAL
Qty: 32 TABLET | Refills: 0 | Status: SHIPPED | OUTPATIENT
Start: 2025-07-14

## 2025-07-14 NOTE — PROGRESS NOTES
Name: Gabe Pickard      : 1945      MRN: [id number]  Encounter Provider: DUNG Aguilar  Encounter Date: 2025   Encounter department: [facility]ARE [address]   :  Assessment & Plan  Pneumonia due to infectious organism, unspecified laterality, unspecified part of lung  He has completed oral antibiotic.   Bronchospasm noted on exam so start prednisone.   ER with fever or worsening symptoms.   Orders:    predniSONE 10 mg tablet; 4 tabs x 3 days, 3 tabs x 3 days 2 tabs x 3 days 1 tab x 3 days, 1/2 tab x 3 days then stop    Influenza A  We discussed it is common to have lingering symptoms.        Hospital discharge follow-up                History of Present Illness   Hospitalized -25. Hospitalization summarized per d/c summary below:    Hospital Course:      Gabe Pickard is a 79 y.o. male patient who originally presented to the hospital on   Admission Orders (From admission, onward)           Ordered        25 2228     INPATIENT ADMISSION  Once                          due to influenza A and possible bacterial pneumonia.  Patient was treated with Tamiflu and  Zosyn.  Patient remains afebrile and is hemodynamically stable cultures are negative to date.  Patient will be discharged on Tamiflu and Augmentin to complete the course.  Patient is saturating well on room air and does not require home oxygen at discharge.    Was feeling better until last night. Cough increased, fatigue and sob with exertion. No fever or chills. Has wheezing. Had chemo last week. Completed antibiotic.       Review of Systems   Constitutional:  Positive for fatigue. Negative for chills and fever.   Respiratory:  Positive for cough, shortness of breath and wheezing.        Objective   There were no vitals taken for this visit.     Physical Exam  Vitals reviewed.   Constitutional:       General: He is not in acute distress.     Appearance: Normal appearance. He is not ill-appearing.      Cardiovascular:      Rate and Rhythm: Normal rate and regular rhythm.      Heart sounds: Murmur heard.   Pulmonary:      Effort: Pulmonary effort is normal.      Breath sounds: Examination of the right-lower field reveals wheezing and rales. Examination of the left-lower field reveals wheezing. Wheezing and rales present.     Skin:     General: Skin is warm and dry.     Neurological:      Mental Status: He is alert and oriented to person, place, and time.     Psychiatric:         Mood and Affect: Mood normal.         Behavior: Behavior normal.         Thought Content: Thought content normal.         Judgment: Judgment normal.

## 2025-07-23 ENCOUNTER — OFFICE VISIT (OUTPATIENT)
Dept: FAMILY MEDICINE CLINIC | Facility: HOSPITAL | Age: 80
End: 2025-07-23
Payer: COMMERCIAL

## 2025-07-23 VITALS
DIASTOLIC BLOOD PRESSURE: 76 MMHG | BODY MASS INDEX: 20.73 KG/M2 | HEART RATE: 68 BPM | TEMPERATURE: 98 F | WEIGHT: 144.8 LBS | HEIGHT: 70 IN | SYSTOLIC BLOOD PRESSURE: 150 MMHG | OXYGEN SATURATION: 97 %

## 2025-07-23 DIAGNOSIS — J18.9 PNEUMONIA DUE TO INFECTIOUS ORGANISM, UNSPECIFIED LATERALITY, UNSPECIFIED PART OF LUNG: Primary | ICD-10-CM

## 2025-07-23 PROCEDURE — 99213 OFFICE O/P EST LOW 20 MIN: CPT | Performed by: NURSE PRACTITIONER

## 2025-07-23 RX ORDER — AZITHROMYCIN 250 MG/1
TABLET, FILM COATED ORAL
Qty: 6 TABLET | Refills: 0 | Status: SHIPPED | OUTPATIENT
Start: 2025-07-23 | End: 2025-07-28

## 2025-07-23 NOTE — PROGRESS NOTES
"Name: Gabe Pickard      : 1945      MRN: [id number]  Encounter Provider: DUNG Aguilar  Encounter Date: 2025   Encounter department: [facility] CARE [address]   :  Assessment & Plan  Pneumonia due to infectious organism, unspecified laterality, unspecified part of lung  He has completed Augmentin. Now with fever.   Start azithromycin.   Advised to call with no improvement or any worsening.   Orders:    azithromycin (Zithromax) 250 mg tablet; Take 2 tablets (500 mg total) by mouth daily for 1 day, THEN 1 tablet (250 mg total) daily for 4 days.           History of Present Illness   Recently hospitalized for pneumonia. Completed Augmentin. Seen last week for TCM and was still coughing with increased sob. Prednisone was initiated. Had fever or 100.9 last night. Last night breathing was labored. Bringing up mucus. Had wheezing which improved after he brought up mucus. Continues on prednisone but does not feel it has helped much. Last night was wheezing. He has had chemo since being in the hospital.       Review of Systems   Constitutional:  Positive for fatigue and fever.   Respiratory:  Positive for cough, shortness of breath and wheezing.        Objective   /76   Pulse 68   Temp 98 °F (36.7 °C)   Ht 5' 10\" (1.778 m)   Wt 65.7 kg (144 lb 12.8 oz)   SpO2 97%   BMI 20.78 kg/m²      Physical Exam  Constitutional:       General: He is not in acute distress.     Appearance: Normal appearance. He is not ill-appearing.     Cardiovascular:      Rate and Rhythm: Normal rate and regular rhythm.      Heart sounds: Murmur heard.   Pulmonary:      Effort: Pulmonary effort is normal.      Breath sounds: Examination of the left-lower field reveals rales. Rales present.     Skin:     General: Skin is warm and dry.     Neurological:      Mental Status: He is alert and oriented to person, place, and time.     Psychiatric:         Mood and Affect: Mood normal.         Behavior: " Behavior normal.         Thought Content: Thought content normal.         Judgment: Judgment normal.

## 2025-07-30 DIAGNOSIS — I25.10 CORONARY ARTERY DISEASE INVOLVING NATIVE CORONARY ARTERY OF NATIVE HEART WITHOUT ANGINA PECTORIS: ICD-10-CM

## 2025-07-30 DIAGNOSIS — I10 ESSENTIAL HYPERTENSION: ICD-10-CM

## 2025-07-30 DIAGNOSIS — I48.0 PAROXYSMAL ATRIAL FIBRILLATION (HCC): ICD-10-CM

## 2025-07-30 DIAGNOSIS — I50.32 CHRONIC DIASTOLIC CONGESTIVE HEART FAILURE (HCC): ICD-10-CM

## 2025-07-30 RX ORDER — METOPROLOL SUCCINATE 100 MG/1
100 TABLET, EXTENDED RELEASE ORAL DAILY
Qty: 90 TABLET | Refills: 1 | Status: SHIPPED | OUTPATIENT
Start: 2025-07-30

## 2025-08-03 PROBLEM — J10.1 INFLUENZA A: Status: RESOLVED | Noted: 2025-07-04 | Resolved: 2025-08-03

## 2025-08-15 ENCOUNTER — HOSPITAL ENCOUNTER (OUTPATIENT)
Dept: CT IMAGING | Facility: HOSPITAL | Age: 80
Discharge: HOME | End: 2025-08-15
Attending: NURSE PRACTITIONER
Payer: COMMERCIAL

## 2025-08-21 ENCOUNTER — APPOINTMENT (OUTPATIENT)
Dept: RADIOLOGY | Facility: HOSPITAL | Age: 80
End: 2025-08-21
Payer: COMMERCIAL

## 2025-08-21 ENCOUNTER — HOSPITAL ENCOUNTER (EMERGENCY)
Facility: HOSPITAL | Age: 80
Discharge: HOME | End: 2025-08-22
Attending: EMERGENCY MEDICINE | Admitting: EMERGENCY MEDICINE
Payer: COMMERCIAL

## 2025-08-21 ENCOUNTER — APPOINTMENT (EMERGENCY)
Dept: CT IMAGING | Facility: HOSPITAL | Age: 80
End: 2025-08-21
Payer: COMMERCIAL

## 2025-08-21 ENCOUNTER — OFFICE VISIT (OUTPATIENT)
Dept: URGENT CARE | Facility: CLINIC | Age: 80
End: 2025-08-21
Payer: COMMERCIAL

## 2025-08-21 VITALS — OXYGEN SATURATION: 94 % | TEMPERATURE: 99.3 F | HEART RATE: 72 BPM | RESPIRATION RATE: 17 BRPM

## 2025-08-21 DIAGNOSIS — R06.02 SHORTNESS OF BREATH: ICD-10-CM

## 2025-08-21 DIAGNOSIS — R05.9 COUGH, UNSPECIFIED TYPE: ICD-10-CM

## 2025-08-21 DIAGNOSIS — R05.9 COUGH: Primary | ICD-10-CM

## 2025-08-21 DIAGNOSIS — R93.89 ABNORMAL CT OF THE CHEST: ICD-10-CM

## 2025-08-21 DIAGNOSIS — R50.9 FEVER, UNSPECIFIED FEVER CAUSE: Primary | ICD-10-CM

## 2025-08-21 LAB
ALBUMIN SERPL BCG-MCNC: 3.9 G/DL (ref 3.5–5)
ALP SERPL-CCNC: 119 U/L (ref 34–104)
ALT SERPL W P-5'-P-CCNC: 10 U/L (ref 7–52)
ANION GAP SERPL CALCULATED.3IONS-SCNC: 9 MMOL/L (ref 4–13)
APTT PPP: 29 SECONDS (ref 23–34)
AST SERPL W P-5'-P-CCNC: 14 U/L (ref 13–39)
BASOPHILS # BLD AUTO: 0.06 THOUSANDS/ÂΜL (ref 0–0.1)
BASOPHILS NFR BLD AUTO: 1 % (ref 0–1)
BILIRUB SERPL-MCNC: 0.38 MG/DL (ref 0.2–1)
BUN SERPL-MCNC: 16 MG/DL (ref 5–25)
CALCIUM SERPL-MCNC: 8.7 MG/DL (ref 8.4–10.2)
CARDIAC TROPONIN I PNL SERPL HS: 16 NG/L (ref ?–50)
CHLORIDE SERPL-SCNC: 99 MMOL/L (ref 96–108)
CO2 SERPL-SCNC: 31 MMOL/L (ref 21–32)
CREAT SERPL-MCNC: 1.05 MG/DL (ref 0.6–1.3)
EOSINOPHIL # BLD AUTO: 0.09 THOUSAND/ÂΜL (ref 0–0.61)
EOSINOPHIL NFR BLD AUTO: 1 % (ref 0–6)
ERYTHROCYTE [DISTWIDTH] IN BLOOD BY AUTOMATED COUNT: 18 % (ref 11.6–15.1)
FLUAV AG UPPER RESP QL IA.RAPID: NEGATIVE
FLUBV AG UPPER RESP QL IA.RAPID: NEGATIVE
GFR SERPL CREATININE-BSD FRML MDRD: 67 ML/MIN/1.73SQ M
GLUCOSE SERPL-MCNC: 105 MG/DL (ref 65–140)
HCT VFR BLD AUTO: 42.9 % (ref 36.5–49.3)
HGB BLD-MCNC: 13.1 G/DL (ref 12–17)
IMM GRANULOCYTES # BLD AUTO: 0.03 THOUSAND/UL (ref 0–0.2)
IMM GRANULOCYTES NFR BLD AUTO: 0 % (ref 0–2)
INR PPP: 1.16 (ref 0.85–1.19)
LACTATE SERPL-SCNC: 0.5 MMOL/L (ref 0.5–2)
LYMPHOCYTES # BLD AUTO: 1.18 THOUSANDS/ÂΜL (ref 0.6–4.47)
LYMPHOCYTES NFR BLD AUTO: 16 % (ref 14–44)
MCH RBC QN AUTO: 25.6 PG (ref 26.8–34.3)
MCHC RBC AUTO-ENTMCNC: 30.5 G/DL (ref 31.4–37.4)
MCV RBC AUTO: 84 FL (ref 82–98)
MONOCYTES # BLD AUTO: 0.81 THOUSAND/ÂΜL (ref 0.17–1.22)
MONOCYTES NFR BLD AUTO: 11 % (ref 4–12)
NEUTROPHILS # BLD AUTO: 5.41 THOUSANDS/ÂΜL (ref 1.85–7.62)
NEUTS SEG NFR BLD AUTO: 71 % (ref 43–75)
NRBC BLD AUTO-RTO: 0 /100 WBCS
PLATELET # BLD AUTO: 207 THOUSANDS/UL (ref 149–390)
PMV BLD AUTO: 9.5 FL (ref 8.9–12.7)
POTASSIUM SERPL-SCNC: 3.7 MMOL/L (ref 3.5–5.3)
PROCALCITONIN SERPL-MCNC: 0.08 NG/ML
PROT SERPL-MCNC: 7 G/DL (ref 6.4–8.4)
PROTHROMBIN TIME: 15.4 SECONDS (ref 12.3–15)
RBC # BLD AUTO: 5.12 MILLION/UL (ref 3.88–5.62)
SARS-COV+SARS-COV-2 AG RESP QL IA.RAPID: NEGATIVE
SODIUM SERPL-SCNC: 139 MMOL/L (ref 135–147)
WBC # BLD AUTO: 7.58 THOUSAND/UL (ref 4.31–10.16)

## 2025-08-21 PROCEDURE — 85610 PROTHROMBIN TIME: CPT | Performed by: EMERGENCY MEDICINE

## 2025-08-21 PROCEDURE — 87804 INFLUENZA ASSAY W/OPTIC: CPT

## 2025-08-21 PROCEDURE — 83605 ASSAY OF LACTIC ACID: CPT | Performed by: EMERGENCY MEDICINE

## 2025-08-21 PROCEDURE — 93005 ELECTROCARDIOGRAM TRACING: CPT

## 2025-08-21 PROCEDURE — 71046 X-RAY EXAM CHEST 2 VIEWS: CPT

## 2025-08-21 PROCEDURE — 99213 OFFICE O/P EST LOW 20 MIN: CPT

## 2025-08-21 PROCEDURE — 84484 ASSAY OF TROPONIN QUANT: CPT | Performed by: EMERGENCY MEDICINE

## 2025-08-21 PROCEDURE — 80053 COMPREHEN METABOLIC PANEL: CPT | Performed by: EMERGENCY MEDICINE

## 2025-08-21 PROCEDURE — 85025 COMPLETE CBC W/AUTO DIFF WBC: CPT | Performed by: EMERGENCY MEDICINE

## 2025-08-21 PROCEDURE — 87040 BLOOD CULTURE FOR BACTERIA: CPT | Performed by: EMERGENCY MEDICINE

## 2025-08-21 PROCEDURE — 84145 PROCALCITONIN (PCT): CPT | Performed by: EMERGENCY MEDICINE

## 2025-08-21 PROCEDURE — 87811 SARS-COV-2 COVID19 W/OPTIC: CPT

## 2025-08-21 PROCEDURE — 71275 CT ANGIOGRAPHY CHEST: CPT

## 2025-08-21 PROCEDURE — 99285 EMERGENCY DEPT VISIT HI MDM: CPT | Performed by: EMERGENCY MEDICINE

## 2025-08-21 PROCEDURE — 99285 EMERGENCY DEPT VISIT HI MDM: CPT

## 2025-08-21 PROCEDURE — 36415 COLL VENOUS BLD VENIPUNCTURE: CPT | Performed by: EMERGENCY MEDICINE

## 2025-08-21 PROCEDURE — 85730 THROMBOPLASTIN TIME PARTIAL: CPT | Performed by: EMERGENCY MEDICINE

## 2025-08-21 RX ADMIN — IOHEXOL 85 ML: 350 INJECTION, SOLUTION INTRAVENOUS at 23:42

## 2025-08-22 ENCOUNTER — TELEPHONE (OUTPATIENT)
Age: 80
End: 2025-08-22

## 2025-08-22 ENCOUNTER — OFFICE VISIT (OUTPATIENT)
Age: 80
End: 2025-08-22
Payer: COMMERCIAL

## 2025-08-22 VITALS
HEIGHT: 70 IN | OXYGEN SATURATION: 96 % | DIASTOLIC BLOOD PRESSURE: 62 MMHG | HEART RATE: 60 BPM | SYSTOLIC BLOOD PRESSURE: 122 MMHG | WEIGHT: 146 LBS | TEMPERATURE: 96.8 F | BODY MASS INDEX: 20.9 KG/M2

## 2025-08-22 VITALS
RESPIRATION RATE: 18 BRPM | SYSTOLIC BLOOD PRESSURE: 162 MMHG | OXYGEN SATURATION: 96 % | TEMPERATURE: 98.9 F | DIASTOLIC BLOOD PRESSURE: 77 MMHG | HEART RATE: 68 BPM

## 2025-08-22 DIAGNOSIS — J18.9 MULTIFOCAL PNEUMONIA: Primary | ICD-10-CM

## 2025-08-22 LAB
2HR DELTA HS TROPONIN: 0 NG/L
BACTERIA UR QL AUTO: ABNORMAL /HPF
BILIRUB UR QL STRIP: NEGATIVE
CARDIAC TROPONIN I PNL SERPL HS: 16 NG/L (ref ?–50)
CLARITY UR: CLEAR
COLOR UR: ABNORMAL
DME PARACHUTE DELIVERY DATE ACTUAL: NORMAL
DME PARACHUTE DELIVERY DATE REQUESTED: NORMAL
DME PARACHUTE DELIVERY NOTE: NORMAL
DME PARACHUTE ITEM DESCRIPTION: NORMAL
DME PARACHUTE ORDER STATUS: NORMAL
DME PARACHUTE SUPPLIER NAME: NORMAL
DME PARACHUTE SUPPLIER PHONE: NORMAL
GLUCOSE UR STRIP-MCNC: ABNORMAL MG/DL
HGB UR QL STRIP.AUTO: ABNORMAL
KETONES UR STRIP-MCNC: NEGATIVE MG/DL
LEUKOCYTE ESTERASE UR QL STRIP: NEGATIVE
NITRITE UR QL STRIP: NEGATIVE
NON-SQ EPI CELLS URNS QL MICRO: ABNORMAL /HPF
PH UR STRIP.AUTO: 6.5 [PH]
PROT UR STRIP-MCNC: ABNORMAL MG/DL
RBC #/AREA URNS AUTO: ABNORMAL /HPF
SP GR UR STRIP.AUTO: 1.01 (ref 1–1.03)
UROBILINOGEN UR STRIP-ACNC: <2 MG/DL
WBC #/AREA URNS AUTO: ABNORMAL /HPF

## 2025-08-22 PROCEDURE — 84484 ASSAY OF TROPONIN QUANT: CPT | Performed by: EMERGENCY MEDICINE

## 2025-08-22 PROCEDURE — 99214 OFFICE O/P EST MOD 30 MIN: CPT | Performed by: NURSE PRACTITIONER

## 2025-08-22 PROCEDURE — 36415 COLL VENOUS BLD VENIPUNCTURE: CPT | Performed by: EMERGENCY MEDICINE

## 2025-08-22 PROCEDURE — 81001 URINALYSIS AUTO W/SCOPE: CPT | Performed by: EMERGENCY MEDICINE

## 2025-08-22 RX ORDER — DOXYCYCLINE 100 MG/1
100 CAPSULE ORAL EVERY 12 HOURS SCHEDULED
Qty: 14 CAPSULE | Refills: 0 | Status: SHIPPED | OUTPATIENT
Start: 2025-08-22 | End: 2025-08-29

## 2025-08-22 RX ORDER — MELATONIN/PYRIDOXINE HCL (B6) 5 MG-10 MG
100 TABLET,IMMED, EXTENDED RELEASE, BIPHASIC ORAL DAILY
COMMUNITY

## 2025-08-22 RX ORDER — ALBUTEROL SULFATE 0.83 MG/ML
2.5 SOLUTION RESPIRATORY (INHALATION) EVERY 6 HOURS PRN
Qty: 180 ML | Refills: 0 | Status: SHIPPED | OUTPATIENT
Start: 2025-08-22

## 2025-08-23 LAB
ATRIAL RATE: 68 BPM
PR INTERVAL: 152 MS
QRS AXIS: -65 DEGREES
QRSD INTERVAL: 152 MS
QT INTERVAL: 470 MS
QTC INTERVAL: 499 MS
T WAVE AXIS: 97 DEGREES
VENTRICULAR RATE: 68 BPM

## 2025-08-23 PROCEDURE — 93010 ELECTROCARDIOGRAM REPORT: CPT | Performed by: INTERNAL MEDICINE

## 2025-08-27 LAB
BACTERIA BLD CULT: NORMAL
BACTERIA BLD CULT: NORMAL

## (undated) DEVICE — COBAN 6 IN STERILE

## (undated) DEVICE — BULB SYRINGE,IRRIGATION WITH PROTECTIVE CAP: Brand: DOVER

## (undated) DEVICE — BETHLEHEM UNIVERSAL OUTPATIENT: Brand: CARDINAL HEALTH

## (undated) DEVICE — ASTOUND STANDARD SURGICAL GOWN, XXL: Brand: CONVERTORS

## (undated) DEVICE — ANTIBACTERIAL UNDYED BRAIDED (POLYGLACTIN 910), SYNTHETIC ABSORBABLE SUTURE: Brand: COATED VICRYL

## (undated) DEVICE — 3M™ IOBAN™ 2 ANTIMICROBIAL INCISE DRAPE 6650EZ: Brand: IOBAN™ 2

## (undated) DEVICE — 3M™ STERI-STRIP™ COMPOUND BENZOIN TINCTURE 40 BAGS/CARTON 4 CARTONS/CASE C1544: Brand: 3M™ STERI-STRIP™

## (undated) DEVICE — OCCLUSIVE GAUZE STRIP,3% BISMUTH TRIBROMOPHENATE IN PETROLATUM BLEND: Brand: XEROFORM

## (undated) DEVICE — GLOVE INDICATOR PI UNDERGLOVE SZ 7 BLUE

## (undated) DEVICE — Device

## (undated) DEVICE — SUT VICRYL 3-0 SH 27 IN J416H

## (undated) DEVICE — ACE WRAP 6 IN UNSTERILE

## (undated) DEVICE — SPINNING CEMENT MIXING BOWL

## (undated) DEVICE — GLOVE SRG BIOGEL 7.5

## (undated) DEVICE — ANTIBACTERIAL VIOLET BRAIDED (POLYGLACTIN 910), SYNTHETIC ABSORBABLE SURGICAL SUTURE: Brand: COATED VICRYL

## (undated) DEVICE — BIPOLAR SEALER 23-301-1 AQM MBS: Brand: AQUAMANTYS™

## (undated) DEVICE — GLOVE SRG BIOGEL 7

## (undated) DEVICE — DRAPE SHEET THREE QUARTER

## (undated) DEVICE — CONMED SCOPE SAVER BITE BLOCK, 20X27 MM: Brand: SCOPE SAVER

## (undated) DEVICE — 5065 IMPAD REGULAR PAIR: Brand: A-V IMPULSE

## (undated) DEVICE — NEEDLE HYPO 22G X 1-1/2 IN

## (undated) DEVICE — SAW BLADE RECIPROCATING 179

## (undated) DEVICE — SYRINGE 30ML LL

## (undated) DEVICE — YELLOW BOAT

## (undated) DEVICE — MEDI-VAC YANKAUER SUCTION HANDLE W/BULBOUS AND CONTROL VENT: Brand: CARDINAL HEALTH

## (undated) DEVICE — HOOD: Brand: FLYTE, SURGICOOL

## (undated) DEVICE — NEURO PATTIES 1/2 X 1 1/2

## (undated) DEVICE — 450 ML BOTTLE OF 0.05% CHLORHEXIDINE GLUCONATE IN 99.95% STERILE WATER FOR IRRIGATION, USP AND APPLICATOR.: Brand: IRRISEPT ANTIMICROBIAL WOUND LAVAGE

## (undated) DEVICE — CAPIT KNEE ATTUNE RP CEMENT - DEPUY

## (undated) DEVICE — 2000CC GUARDIAN II: Brand: GUARDIAN

## (undated) DEVICE — UNIVERSAL MAJOR EXTREMITY,KIT: Brand: CARDINAL HEALTH

## (undated) DEVICE — TUBING SUCTION 5MM X 12 FT

## (undated) DEVICE — 3M™ STERI-STRIP™ REINFORCED ADHESIVE SKIN CLOSURES, R1547, 1/2 IN X 4 IN (12 MM X 100 MM), 6 STRIPS/ENVELOPE: Brand: 3M™ STERI-STRIP™

## (undated) DEVICE — NEEDLE 18 G X 1 1/2

## (undated) DEVICE — GLOVE SRG BIOGEL ORTHOPEDIC 7

## (undated) DEVICE — INTENDED FOR TISSUE SEPARATION, AND OTHER PROCEDURES THAT REQUIRE A SHARP SURGICAL BLADE TO PUNCTURE OR CUT.: Brand: BARD-PARKER SAFETY BLADES SIZE 10, STERILE

## (undated) DEVICE — CHLORAPREP HI-LITE 26ML ORANGE

## (undated) DEVICE — GLOVE INDICATOR PI UNDERGLOVE SZ 8 BLUE

## (undated) DEVICE — SUT STRATAFIX SPIRAL 3-0 PGA/PCL 30 X 30 CM SXMD2B408

## (undated) DEVICE — CAST PADDING 6 IN STERILE

## (undated) DEVICE — THE SIMPULSE SOLO SYSTEM WITH ULTREX RETRACTABLE SPLASH SHIELD TIP: Brand: SIMPULSE SOLO

## (undated) DEVICE — HEAVY DUTY TABLE COVER: Brand: CONVERTORS

## (undated) DEVICE — GLOVE SRG BIOGEL ORTHOPEDIC 7.5

## (undated) DEVICE — BLADE INTREX LRG BONE OSCILLATING

## (undated) DEVICE — ABDOMINAL PAD: Brand: DERMACEA

## (undated) DEVICE — SUT VICRYL 0 CT-1 27 IN J260H

## (undated) DEVICE — BANDAGE, ESMARK LF STR 6"X9' (20/CS): Brand: CYPRESS